# Patient Record
Sex: FEMALE | Race: ASIAN | NOT HISPANIC OR LATINO | Employment: PART TIME | URBAN - METROPOLITAN AREA
[De-identification: names, ages, dates, MRNs, and addresses within clinical notes are randomized per-mention and may not be internally consistent; named-entity substitution may affect disease eponyms.]

---

## 2017-01-01 ENCOUNTER — MYC MEDICAL ADVICE (OUTPATIENT)
Dept: NEUROLOGY | Facility: CLINIC | Age: 27
End: 2017-01-01

## 2017-01-03 NOTE — TELEPHONE ENCOUNTER
Patient with involved medical history, including intercranial bleed, low platelets, and end stage renal disease. At the last visit with , she reported bruising of unknown origin.  Since then she has been seen by her PMD for ongoing diarrhea, and by haem-onc.  Discussed with . There is concern that an increase in headaches could be a symptom of further intercranial bleeding, vs other cause.  Patient should be called and assessment should be made of additional changes in neurological status, including seizures, confusion, nausea/vomiting, dizziness, incontinence, and poor balance.  If there are still concerns, patient could undergo a head CT to check for bleed, and return to MD next week. If more acute, patient should attend a local ED for urgent assessment.    Call placed to patient. She is currently getting her hair done and was not available.  Message left for her to call back.    1/9/17    Writer contacted Mona who reports that she is doing better. Her blood had been running higher than normal.   Denies neurologic changes (confusion, n/v/d, dizziness, seizures). Endorses poor balance x 1 a few days ago.  Advised to seek medical treatment at local ED if headaches worsen, change in neurological status, confusion or other concerns.

## 2017-01-05 DIAGNOSIS — M54.9 BACK PAIN, UNSPECIFIED BACK LOCATION, UNSPECIFIED BACK PAIN LATERALITY, UNSPECIFIED CHRONICITY: Primary | ICD-10-CM

## 2017-01-06 RX ORDER — ACETAMINOPHEN 325 MG/1
650 TABLET ORAL EVERY 4 HOURS PRN
Qty: 100 TABLET | Refills: 1 | Status: SHIPPED | OUTPATIENT
Start: 2017-01-06 | End: 2017-11-22

## 2017-01-16 ENCOUNTER — OFFICE VISIT (OUTPATIENT)
Dept: FAMILY MEDICINE | Facility: CLINIC | Age: 27
End: 2017-01-16

## 2017-01-16 VITALS
BODY MASS INDEX: 21.13 KG/M2 | DIASTOLIC BLOOD PRESSURE: 96 MMHG | HEIGHT: 60 IN | TEMPERATURE: 98.1 F | SYSTOLIC BLOOD PRESSURE: 157 MMHG | HEART RATE: 100 BPM | WEIGHT: 107.6 LBS | OXYGEN SATURATION: 100 %

## 2017-01-16 DIAGNOSIS — K59.1 FUNCTIONAL DIARRHEA: Primary | ICD-10-CM

## 2017-01-16 DIAGNOSIS — Z30.41 SURVEILLANCE OF PREVIOUSLY PRESCRIBED CONTRACEPTIVE PILL: ICD-10-CM

## 2017-01-16 RX ORDER — ACETAMINOPHEN AND CODEINE PHOSPHATE 120; 12 MG/5ML; MG/5ML
1 SOLUTION ORAL DAILY
Qty: 28 TABLET | Refills: 3 | Status: SHIPPED
Start: 2017-01-16 | End: 2017-04-27

## 2017-01-16 NOTE — MR AVS SNAPSHOT
After Visit Summary   1/16/2017    Mona Wagner    MRN: [id number]           Patient Information     Date Of Birth          1990        Visit Information        Provider Department      1/16/2017 10:40 AM Tracey Villalobos, DO [facility]        Today's Diagnoses     Surveillance of previously prescribed contraceptive pill    -  1       Care Instructions      What is IBS?  People who have irritable bowel syndrome (IBS) have digestive tracts that react abnormally to certain substances or to stress. This leads to symptoms like cramps, gas, bloating, pain, constipation, and diarrhea. Sometimes called  spastic colon,  IBS is a common condition that is not a disease, but rather a group of symptoms that occur together.     Muscle contraction moves food through the digestive tract.    IBS -- a motility problem  The muscle movement that passes food through the digestive tract is called motility. When you have IBS, the normal motility of the digestive tract (especially the colon) is disrupted. Motility may speed up, slow down, or become irregular. If stool passes too quickly through the colon, not enough water is absorbed from it. Loose, watery stools (diarrhea) can result. If stool passes through the colon too slowly, too much water is absorbed and the stool becomes hard and dry (constipation). Also, stool and gas may back up and cause painful pressure and cramping.  What causes IBS?    Smoking, eating certain foods, or drinking alcohol or caffeinated drinks can cause symptoms of IBS.    Stress or anxiety can contribute to the motility problems that cause IBS.    Although no one knows for sure, IBS may be caused by a problem with the nerves or muscles in your digestive tract.    There is also some evidence that certain bacteria in the small intestine and colon may cause IBS.  What you can do    Certain medications may help regulate the working of your digestive tract. Your health care provider  may prescribe one or more for you.    Medication can t cure IBS, but it may help manage the symptoms.    Because some medications may make IBS worse, don t take any medication, especially laxatives, unless your health care provider prescribes it for you.    Your health care provider may suggest some lifestyle changes to help control your IBS. Two of the most important are changing your diet and managing stress.    7644-5660 [employer]. [address] [place] [address], [place] [address]. All rights reserved. This information is not intended as a substitute for professional medical care. Always follow your healthcare professional's instructions.        Your Body s Response to Anxiety  Normal anxiety is part of the body s natural defense system. It's an alert to a threat that is unknown, vague, or comes from your own internal fears. While you re in this state, your feelings can range from a vague sense of worry to physical sensations such as a pounding heartbeat. These feelings make you want to react to the threat. An anxiety response is normal in many situations. But when you have an anxiety disorder, the same response can occur at the wrong times.    Anxiety can be helpful  Normal anxiety is a signal from your brain that warns you of a threat and is a normal response to help you prevent something or decrease the bad effects of something you can't control. For example, anxiety is a normal response to situations that might damage your body, separate you from a loved one, or lose your job. The symptoms of anxiety can be physical and mental.  How does it feel?  At certain times, people with anxiety may have:    Dizziness    Muscle tension or pain    Restlessness    Sleeplessness    Difficulty concentrating    Racing heartbeat    Fast breathing    Shaking or trembling    Stomachache    Diarrhea    Loss of energy    Sweating    Cold, clammy hands    Chest pain    Dry mouth  Anxiety can also be a problem  Anxiety can  become a problem when it is difficult to control, occurs for months, and interferes with important parts of your life. With an anxiety disorder, your body has the response described above, but in inappropriate ways. The response a person has depends on the anxiety disorder he or she has. With some disorders, the anxiety is way out of proportion to the threat that triggers it. With others, anxiety may occur even when there isn t a clear threat or trigger.  Who does it affect?  Some people are more prone to persistent anxiety than others. It tends to run in families, and it affects more younger people than older people. But no age, race, or gender is immune to anxiety problems.  Anxiety can be treated  The good news is that the anxiety that s disrupting your life can be treated. Working with your doctor or other healthcare provider, you can develop skills to help you cope with anxiety. You can also gain the perspective you need to overcome your fears. Note: Good sources of support or guidance can be found at your local hospital, mental health clinic, or an employee assistance program.     If anxiety is wearing you down, here are some things you can do to cope:    Keep in mind that you can t control everything about a situation. Change what you can and let the rest take its course.    Exercise--it s a great way to relieve tension and help your body feel relaxed.    Avoid caffeine and nicotine, which can make anxiety symptoms worse.    Fight the temptation to turn to alcohol or unprescribed drugs for relief. They only make things worse in the long run.     8061-7962 [employer]. [address] [place] [address]. All rights reserved. This information is not intended as a substitute for professional medical care. Always follow your healthcare professional's instructions.              Follow-ups after your visit        Your next 10 appointments already scheduled     Feb 21, 2017 12:00 PM   (Arrive by  "11:30 AM)   RETURN ENDOCRINE with Melissa Coles MD   [facility] ([facility])    [address]  3rd Floor  [facility] [address] [phone]            Jun 26, 2017 10:00 AM   Return Visit with Theresa Sapp MD   [facility]    [address], [address]  [facility] [address] [phone]              Who to contact     Please call your clinic at [phone] to:    Ask questions about your health    Make or cancel appointments    Discuss your medicines    Learn about your test results    Speak to your doctor   If you have compliments or concerns about an experience at your clinic, or if you wish to file a complaint, please contact [facility] Patient Relations at [phone] or email us at [email or web address]         Additional Information About Your Visit        StudyRoomharParature Information     [email or web address] gives you secure access to your electronic health record. If you see a primary care provider, you can also send messages to your care team and make appointments. If you have questions, please call your primary care clinic.  If you do not have a primary care provider, please call [phone] and they will assist you.      [email or web address] is an electronic gateway that provides easy, online access to your medical records. With [email or web address], you can request a clinic appointment, read your test results, renew a prescription or communicate with your care team.     To access your existing account, please contact your [facility] or call [phone] for assistance.        Care EveryWhere ID     This is your Care EveryWhere ID. This could be used by other organizations to access your [facility] [place] medical records  [id number]        Your Vitals Were     Pulse Temperature Height BMI (Body Mass Index) Pulse Oximetry       100 98.1  F (36.7  C) (Oral) 4' 11.5\" " (151.1 cm) 21.38 kg/m2 100%        Blood Pressure from Last 3 Encounters:   01/16/17 157/96   12/29/16 173/101   12/27/16 177/97    Weight from Last 3 Encounters:   01/16/17 107 lb 9.6 oz (48.807 kg)   12/29/16 106 lb 3.2 oz (48.172 kg)   12/27/16 105 lb 3.2 oz (47.718 kg)              Today, you had the following     No orders found for display         Where to get your medicines      These medications were sent to [id number] - [place], [place] - [address]  [address] [address]     Phone:  [phone]    - norethindrone 0.35 MG per tablet       Primary Care Provider Office Phone # Fax #    Macarena Bowen [phone]       [facility] [address] [address]        Thank you!     Thank you for choosing [facility]  for your care. Our goal is always to provide you with excellent care. Hearing back from our patients is one way we can continue to improve our services. Please take a few minutes to complete the written survey that you may receive in the mail after your visit with us. Thank you!             Your Updated Medication List - Protect others around you: Learn how to safely use, store and throw away your medicines at www.disposemymeds.org.          This list is accurate as of: 1/16/17 11:42 AM.  Always use your most recent med list.                   Brand Name Dispense Instructions for use    acetaminophen 325 MG tablet    TYLENOL    100 tablet    Take 2 tablets (650 mg) by mouth every 4 hours as needed for mild pain       amLODIPine 5 MG tablet    NORVASC    180 tablet    Take 2 tablets (10 mg) by mouth daily Take one tablet twice a day.       B complex-C-folic acid 1 MG capsule     30 capsule    Take 1 capsule (1 mg) by mouth daily       doxazosin 1 MG tablet    CARDURA    30 tablet    Take 2 tablets (2 mg) by mouth At Bedtime       levETIRAcetam 250 MG tablet    KEPPRA    60 tablet    Take  1 1/2 tabs daily.  Take an additional  250 mg after dialysis on M, W, F.       levothyroxine 25 MCG tablet    SYNTHROID/LEVOTHROID    135 tablet    Take 1.5 tablets (37.5 mcg) by mouth daily       lisinopril 20 MG tablet    PRINIVIL/ZESTRIL    90 tablet    Take 1 tablet (20 mg) by mouth daily       norethindrone 0.35 MG per tablet    MICRONOR    28 tablet    Take 1 tablet (0.35 mg) by mouth daily       ondansetron 4 MG tablet    ZOFRAN    18 tablet    Take 1 tablet (4 mg) by mouth every 8 hours as needed for nausea       sevelamer 800 MG tablet    RENVELA     Take 800 mg by mouth 3 times daily (with meals)

## 2017-01-16 NOTE — PROGRESS NOTES
HPI:       Mona Wagner is a 26 year old  female with past medical history significant for ESRD on dialysis, anemia of chronic kidney disease, acquired hypothyroidism, HTN, and seizure disorder who presents for follow up of concern(s) of diarrhea.    Symptoms improving.   Stools becoming more firm over the last 3 days.   No dietary changes last 3 days. Is on dialysis diet.   Has had some medication changes to improve BP control. (Nephrology titrating BP meds)  Insight, correlation of anxiety and increased frequency of stooling. JUAN ALBERTO-7 score of 6.   Desires review of labs.          PMHX:     Patient Active Problem List   Diagnosis     ESRD (end stage renal disease) on dialysis (H)     DVT of upper extremity (deep vein thrombosis) (H)     Subarachnoid hemorrhage (H)     Seizure disorder (H)     HTN (hypertension)     Other general symptoms(780.99)     Galactorrhea     Acquired hypothyroidism     Anemia in chronic kidney disease     Increased prolactin level (H)     Hyperphosphatemia     Hypertension     Hypomagnesemia     Normocytic anemia     Pituitary neoplasm     Thrombocytopenia (H)       Current Outpatient Prescriptions   Medication Sig Dispense Refill     acetaminophen (TYLENOL) 325 MG tablet Take 2 tablets (650 mg) by mouth every 4 hours as needed for mild pain 100 tablet 1     amLODIPine (NORVASC) 5 MG tablet Take 2 tablets (10 mg) by mouth daily Take one tablet twice a day. 180 tablet 3     doxazosin (CARDURA) 1 MG tablet Take 2 tablets (2 mg) by mouth At Bedtime 30 tablet 11     levETIRAcetam (KEPPRA) 250 MG tablet Take  1 1/2 tabs daily.  Take an additional 250 mg after dialysis on M, W, F. 60 tablet 5     lisinopril (PRINIVIL/ZESTRIL) 20 MG tablet Take 1 tablet (20 mg) by mouth daily 90 tablet 3     ondansetron (ZOFRAN) 4 MG tablet Take 1 tablet (4 mg) by mouth every 8 hours as needed for nausea 18 tablet 0     norethindrone (MICRONOR) 0.35 MG per tablet Take 1 tablet (0.35 mg) by mouth daily 28 tablet  "3     levothyroxine (SYNTHROID, LEVOTHROID) 25 MCG tablet Take 1.5 tablets (37.5 mcg) by mouth daily 135 tablet 3     sevelamer (RENVELA) 800 MG tablet Take 800 mg by mouth 3 times daily (with meals)       B complex-C-folic acid (NEPHROCAPS) 1 MG capsule Take 1 capsule (1 mg) by mouth daily 30 capsule 3     Allergies   Allergen Reactions     Chlorhexidine Rash     Rash at site     Sulfa Drugs Rash     Muscle stiffness of neck            Review of Systems:   CONSTITUTIONAL: No weight loss, fevers, or fatigue.   GI: Persistent diarrhea with associated abdominal cramps, no blood in stool. No nausea or vomiting.  PSYCH: anxious          Physical Exam:     Filed Vitals:    01/16/17 1052   BP: 157/96   Pulse: 100   Temp: 98.1  F (36.7  C)   TempSrc: Oral   Height: 4' 11.5\" (151.1 cm)   Weight: 107 lb 9.6 oz (48.807 kg)   SpO2: 100%     Body mass index is 21.38 kg/(m^2).    GENERAL: Mona is a 25 yo [place] female, appears stated age, no acute distress.  HEART: RRR, no MRG  LUNGS: CTAB, unlabored  ABDOMEN: soft, nontender, bowel sounds present     Assessment and Plan     1. Functional diarrhea  Laboratory evaluation negative. Fluctuating amount of diarrhea despite consistent diet, therefore less likely to be related to diet. With association with anxiety, most likely represents irritable bowel syndrome.   - Provided information on IBS in AVS.   - Recommended mindfulness practices, consider referral for psychotherapy.   - Avoid initiation of additional medication (SSRI) with concern for GI side effects. Consider medication with anticholingeric action of diarrhea persists.       2. Surveillance of previously prescribed contraceptive pill  - norethindrone (MICRONOR) 0.35 MG per tablet; Take 1 tablet (0.35 mg) by mouth daily  Dispense: 28 tablet; Refill: 3      Options for treatment and follow-up care were reviewed with the patient and/or guardian. Mona Wagner and/or guardian engaged in the decision making process and verbalized " understanding of the options discussed and agreed with the final plan.    Tracey Villalobos DO      Precepted today with: Fernie Gamez MD

## 2017-01-16 NOTE — PROGRESS NOTES
Preceptor Attestation:  Patient's case reviewed and discussed with Tracey Villalobos,  resident and I evaluated the patient. I agree with written assessment and plan of care.  Supervising Physician:  Fernie Gamez MD  [facility]

## 2017-01-16 NOTE — PATIENT INSTRUCTIONS
What is IBS?  People who have irritable bowel syndrome (IBS) have digestive tracts that react abnormally to certain substances or to stress. This leads to symptoms like cramps, gas, bloating, pain, constipation, and diarrhea. Sometimes called  spastic colon,  IBS is a common condition that is not a disease, but rather a group of symptoms that occur together.     Muscle contraction moves food through the digestive tract.    IBS -- a motility problem  The muscle movement that passes food through the digestive tract is called motility. When you have IBS, the normal motility of the digestive tract (especially the colon) is disrupted. Motility may speed up, slow down, or become irregular. If stool passes too quickly through the colon, not enough water is absorbed from it. Loose, watery stools (diarrhea) can result. If stool passes through the colon too slowly, too much water is absorbed and the stool becomes hard and dry (constipation). Also, stool and gas may back up and cause painful pressure and cramping.  What causes IBS?    Smoking, eating certain foods, or drinking alcohol or caffeinated drinks can cause symptoms of IBS.    Stress or anxiety can contribute to the motility problems that cause IBS.    Although no one knows for sure, IBS may be caused by a problem with the nerves or muscles in your digestive tract.    There is also some evidence that certain bacteria in the small intestine and colon may cause IBS.  What you can do    Certain medications may help regulate the working of your digestive tract. Your health care provider may prescribe one or more for you.    Medication can t cure IBS, but it may help manage the symptoms.    Because some medications may make IBS worse, don t take any medication, especially laxatives, unless your health care provider prescribes it for you.    Your health care provider may suggest some lifestyle changes to help control your IBS. Two of the most important are changing your  diet and managing stress.    8181-4841 [employer]. [address] [place] [address], [place] [address]. All rights reserved. This information is not intended as a substitute for professional medical care. Always follow your healthcare professional's instructions.        Your Body s Response to Anxiety  Normal anxiety is part of the body s natural defense system. It's an alert to a threat that is unknown, vague, or comes from your own internal fears. While you re in this state, your feelings can range from a vague sense of worry to physical sensations such as a pounding heartbeat. These feelings make you want to react to the threat. An anxiety response is normal in many situations. But when you have an anxiety disorder, the same response can occur at the wrong times.    Anxiety can be helpful  Normal anxiety is a signal from your brain that warns you of a threat and is a normal response to help you prevent something or decrease the bad effects of something you can't control. For example, anxiety is a normal response to situations that might damage your body, separate you from a loved one, or lose your job. The symptoms of anxiety can be physical and mental.  How does it feel?  At certain times, people with anxiety may have:    Dizziness    Muscle tension or pain    Restlessness    Sleeplessness    Difficulty concentrating    Racing heartbeat    Fast breathing    Shaking or trembling    Stomachache    Diarrhea    Loss of energy    Sweating    Cold, clammy hands    Chest pain    Dry mouth  Anxiety can also be a problem  Anxiety can become a problem when it is difficult to control, occurs for months, and interferes with important parts of your life. With an anxiety disorder, your body has the response described above, but in inappropriate ways. The response a person has depends on the anxiety disorder he or she has. With some disorders, the anxiety is way out of proportion to the threat that triggers it. With  others, anxiety may occur even when there isn t a clear threat or trigger.  Who does it affect?  Some people are more prone to persistent anxiety than others. It tends to run in families, and it affects more younger people than older people. But no age, race, or gender is immune to anxiety problems.  Anxiety can be treated  The good news is that the anxiety that s disrupting your life can be treated. Working with your doctor or other healthcare provider, you can develop skills to help you cope with anxiety. You can also gain the perspective you need to overcome your fears. Note: Good sources of support or guidance can be found at your local hospital, mental health clinic, or an employee assistance program.     If anxiety is wearing you down, here are some things you can do to cope:    Keep in mind that you can t control everything about a situation. Change what you can and let the rest take its course.    Exercise--it s a great way to relieve tension and help your body feel relaxed.    Avoid caffeine and nicotine, which can make anxiety symptoms worse.    Fight the temptation to turn to alcohol or unprescribed drugs for relief. They only make things worse in the long run.     9878-2641 [employer]. [address] [place] [address], [place] [address]. All rights reserved. This information is not intended as a substitute for professional medical care. Always follow your healthcare professional's instructions.

## 2017-02-17 ASSESSMENT — ENCOUNTER SYMPTOMS
SPEECH CHANGE: 0
TACHYCARDIA: 1
SLEEP DISTURBANCES DUE TO BREATHING: 0
ALTERED TEMPERATURE REGULATION: 1
HOT FLASHES: 1
MEMORY LOSS: 0
HYPERTENSION: 1
HEADACHES: 1
INCREASED ENERGY: 0
HOARSE VOICE: 0
SKIN CHANGES: 0
SINUS CONGESTION: 0
TROUBLE SWALLOWING: 0
NIGHT SWEATS: 1
TINGLING: 0
DECREASED APPETITE: 0
SMELL DISTURBANCE: 0
NUMBNESS: 0
WEIGHT GAIN: 0
EXERCISE INTOLERANCE: 0
FEVER: 0
FATIGUE: 0
SEIZURES: 0
SORE THROAT: 1
SYNCOPE: 0
LOSS OF CONSCIOUSNESS: 0
WEAKNESS: 0
POLYPHAGIA: 0
DECREASED LIBIDO: 0
HALLUCINATIONS: 0
NAIL CHANGES: 0
NECK MASS: 0
PALPITATIONS: 1
WEIGHT LOSS: 0
LEG PAIN: 0
ORTHOPNEA: 0
POOR WOUND HEALING: 0
CLAUDICATION: 0
DIZZINESS: 0
LEG SWELLING: 0
LIGHT-HEADEDNESS: 0
DISTURBANCES IN COORDINATION: 0
TASTE DISTURBANCE: 0
CHILLS: 0
POLYDIPSIA: 0
SINUS PAIN: 0
HYPOTENSION: 0
PARALYSIS: 0
TREMORS: 0

## 2017-02-21 ENCOUNTER — OFFICE VISIT (OUTPATIENT)
Dept: TRANSPLANT | Facility: CLINIC | Age: 27
End: 2017-02-21
Attending: CLINICAL NURSE SPECIALIST
Payer: MEDICARE

## 2017-02-21 ENCOUNTER — RESULTS ONLY (OUTPATIENT)
Dept: OTHER | Facility: CLINIC | Age: 27
End: 2017-02-21

## 2017-02-21 ENCOUNTER — TRANSFERRED RECORDS (OUTPATIENT)
Dept: HEALTH INFORMATION MANAGEMENT | Facility: CLINIC | Age: 27
End: 2017-02-21

## 2017-02-21 ENCOUNTER — OFFICE VISIT (OUTPATIENT)
Dept: ENDOCRINOLOGY | Facility: CLINIC | Age: 27
End: 2017-02-21

## 2017-02-21 VITALS
TEMPERATURE: 97.4 F | DIASTOLIC BLOOD PRESSURE: 108 MMHG | OXYGEN SATURATION: 95 % | RESPIRATION RATE: 16 BRPM | HEART RATE: 61 BPM | SYSTOLIC BLOOD PRESSURE: 173 MMHG

## 2017-02-21 VITALS
DIASTOLIC BLOOD PRESSURE: 104 MMHG | SYSTOLIC BLOOD PRESSURE: 165 MMHG | BODY MASS INDEX: 20.85 KG/M2 | HEIGHT: 60 IN | HEART RATE: 78 BPM | WEIGHT: 106.2 LBS

## 2017-02-21 DIAGNOSIS — N89.8 VAGINAL ITCHING: ICD-10-CM

## 2017-02-21 DIAGNOSIS — D69.6 THROMBOCYTOPENIA (H): ICD-10-CM

## 2017-02-21 DIAGNOSIS — N64.3 GALACTORRHEA: ICD-10-CM

## 2017-02-21 DIAGNOSIS — B37.31 VULVOVAGINAL CANDIDIASIS: ICD-10-CM

## 2017-02-21 DIAGNOSIS — T82.9XXA COMPLICATION OF VASCULAR ACCESS FOR DIALYSIS, INITIAL ENCOUNTER: ICD-10-CM

## 2017-02-21 DIAGNOSIS — Z76.82 ORGAN TRANSPLANT CANDIDATE: ICD-10-CM

## 2017-02-21 DIAGNOSIS — T82.9XXA COMPLICATION OF AV DIALYSIS FISTULA, INITIAL ENCOUNTER: ICD-10-CM

## 2017-02-21 DIAGNOSIS — Z76.82 ORGAN TRANSPLANT CANDIDATE: Primary | ICD-10-CM

## 2017-02-21 DIAGNOSIS — E03.9 ACQUIRED HYPOTHYROIDISM: ICD-10-CM

## 2017-02-21 DIAGNOSIS — E03.9 ACQUIRED HYPOTHYROIDISM: Primary | ICD-10-CM

## 2017-02-21 DIAGNOSIS — N18.6 ESRD ON DIALYSIS (H): Primary | ICD-10-CM

## 2017-02-21 DIAGNOSIS — N91.2 AMENORRHEA: ICD-10-CM

## 2017-02-21 DIAGNOSIS — T82.898A OTHER SPECIFIED COMPLICATION OF VASCULAR PROSTHETIC DEVICES, IMPLANTS AND GRAFTS, INITIAL ENCOUNTER (H): ICD-10-CM

## 2017-02-21 DIAGNOSIS — Z99.2 ESRD ON DIALYSIS (H): Primary | ICD-10-CM

## 2017-02-21 LAB
APTT PPP: 31 SEC (ref 22–37)
FIBRINOGEN PPP-MCNC: 311 MG/DL (ref 200–420)
INR PPP: 0.93 (ref 0.86–1.14)
PROLACTIN SERPL-MCNC: 38 UG/L (ref 3–27)
T3 SERPL-MCNC: 75 NG/DL (ref 60–181)
T4 FREE SERPL-MCNC: 1.11 NG/DL (ref 0.76–1.46)
TSH SERPL DL<=0.05 MIU/L-ACNC: 1.64 MU/L (ref 0.4–4)

## 2017-02-21 PROCEDURE — 86832 HLA CLASS I HIGH DEFIN QUAL: CPT | Performed by: INTERNAL MEDICINE

## 2017-02-21 PROCEDURE — 99211 OFF/OP EST MAY X REQ PHY/QHP: CPT

## 2017-02-21 PROCEDURE — 84480 ASSAY TRIIODOTHYRONINE (T3): CPT | Performed by: INTERNAL MEDICINE

## 2017-02-21 PROCEDURE — 84146 ASSAY OF PROLACTIN: CPT | Performed by: INTERNAL MEDICINE

## 2017-02-21 PROCEDURE — 86833 HLA CLASS II HIGH DEFIN QUAL: CPT | Performed by: INTERNAL MEDICINE

## 2017-02-21 RX ORDER — LEVOTHYROXINE SODIUM 25 UG/1
TABLET ORAL
Qty: 135 TABLET | Refills: 3 | Status: SHIPPED | OUTPATIENT
Start: 2017-02-21 | End: 2018-04-09

## 2017-02-21 ASSESSMENT — PAIN SCALES - GENERAL: PAINLEVEL: NO PAIN (0)

## 2017-02-21 NOTE — PROGRESS NOTES
Student Endocrinology Note    Chief Complaint: Hyperprolactinemia   Information obtained from:Patient    Subjective:         HPI: Mona Wagner is a 26 year old year old woman with PMHx of ESRD on dialysis, galactorrhea 2/2 pituitary microadenoma, and hypothyroidism who presents for follow-up of her hypothyroidism and hyperprolactinemia. I last saw her in August 2016.    Mona had galactorrhea that resolved after correction of her hypothyroidism with levothyroxine. She still had an elevated prolactin (PRL= 43) despite resolution of the galactorrhea. She had a repeat MRI of the brain, that reports no changes in pituitary imaging as compared to previous image study. This was not a pituitary dedicated MRI.  She has not had galactorrhea since starting thyroid treatment. She takes a contraceptive pill in addition to thyroid medication. She has never seen a neurosurgeon regarding her pituitary mass.    Ms. Wagner endorses having headaches, rapid heart rate, and feeling anxious.  She also reports night sweats on her face and chest, always at night, 2-3 times per week.  She wakes up during the night due to these sweating spells. She also reports hair loss.  She also reports loose stools and diarrhea.  She has not noticed any skin or hair changes, extremity edema, or vision changes. She has not noted any changes in her peripheral vision. Mona also lost about 4 lbs.  She is currently on levothyroxine 37.5 mcg on per day.  She is concerned her levothyroxine dose is too high and causing these symptoms.  She takes levothyroxine at bed time, and is concerned the night sweating she is experiencing is related to that.    Ms. Wagner has IgA nephropathy. She has ESRD and is on hemodialysis.  She is on the transplant list.      Past Medical History   Diagnosis Date     Anemia in chronic kidney disease      Dialysis patient (H)      End stage renal disease on dialysis (H)      Hypothyroid      Pituitary adenoma (H)        Past Surgical  History   Procedure Laterality Date     Luis/dialysis catheter  12/10/2013           Create fistula arteriovenous upper extremity  1/2/2014     Procedure: CREATE FISTULA ARTERIOVENOUS UPPER EXTREMITY;  Left Wrist Arteriovenous Fistula Placement;  Surgeon: Shashi Castro MD;  Location: [facility] OR       Current Outpatient Prescriptions   Medication Sig Dispense Refill     norethindrone (MICRONOR) 0.35 MG per tablet Take 1 tablet (0.35 mg) by mouth daily 28 tablet 3     acetaminophen (TYLENOL) 325 MG tablet Take 2 tablets (650 mg) by mouth every 4 hours as needed for mild pain 100 tablet 1     amLODIPine (NORVASC) 5 MG tablet Take 2 tablets (10 mg) by mouth daily Take one tablet twice a day. 180 tablet 3     levETIRAcetam (KEPPRA) 250 MG tablet Take  1 1/2 tabs daily.  Take an additional 250 mg after dialysis on M, W, F. 60 tablet 5     lisinopril (PRINIVIL/ZESTRIL) 20 MG tablet Take 1 tablet (20 mg) by mouth daily (Patient taking differently: Take 40 mg by mouth daily ) 90 tablet 3     ondansetron (ZOFRAN) 4 MG tablet Take 1 tablet (4 mg) by mouth every 8 hours as needed for nausea 18 tablet 0     levothyroxine (SYNTHROID, LEVOTHROID) 25 MCG tablet Take 1.5 tablets (37.5 mcg) by mouth daily 135 tablet 3     sevelamer (RENVELA) 800 MG tablet Take 800 mg by mouth 3 times daily (with meals)        B complex-C-folic acid (NEPHROCAPS) 1 MG capsule Take 1 capsule (1 mg) by mouth daily 30 capsule 3     doxazosin (CARDURA) 1 MG tablet Take 2 tablets (2 mg) by mouth At Bedtime (Patient not taking: Reported on 2/21/2017) 30 tablet 11          Allergies   Allergen Reactions     Chlorhexidine Rash     Rash at site     Sulfa Drugs Rash     Muscle stiffness of neck       Family History   Problem Relation Age of Onset     Hypertension Sister      DIABETES No family hx of      Coronary Artery Disease No family hx of      Breast Cancer No family hx of      Cancer - colorectal No family hx of      Ovarian Cancer No family hx of       Prostate Cancer No family hx of      Other Cancer No family hx of      CEREBROVASCULAR DISEASE No family hx of      Asthma No family hx of      KIDNEY DISEASE Mother      KIDNEY DISEASE Sister        Social History     Social History     Marital Status:      Spouse Name: N/A     Number of Children: N/A     Years of Education: N/A     Social History Main Topics     Smoking status: Never Smoker      Smokeless tobacco: Never Used     Alcohol Use: No     Drug Use: No     Sexual Activity:     Partners: Male     Other Topics Concern     None     Social History Narrative    Date of Service:5/15/2013     Name: Mona VALDOVINOS (Month, Day, Year of birth): 1990     MRN: [id number]     New Patient: Yes    Preferred Language: English     Needed: No    County of Residence: [place]    Marital Status:     Household size: 8    Number of Dependents:0     Pregnant: 0    Average Monthly Income: $ 600    Citizenship Status: Citizen    Gave Pt [employer] and [employer] applications       Review of Systems   11 Point ROS is as detailed in the HPI above, as below or negative    Answers for HPI/ROS submitted by the patient on 2017   General Symptoms: Yes  Skin Symptoms: Yes  HENT Symptoms: Yes  EYE SYMPTOMS: No  HEART SYMPTOMS: Yes  LUNG SYMPTOMS: No  INTESTINAL SYMPTOMS: No  URINARY SYMPTOMS: No  GYNECOLOGIC SYMPTOMS: Yes  BREAST SYMPTOMS: No  SKELETAL SYMPTOMS: No  BLOOD SYMPTOMS: No  NERVOUS SYSTEM SYMPTOMS: Yes  MENTAL HEALTH SYMPTOMS: No  Night sweats: Yes  Feeling hot or cold when others believe the temperature is normal: Yes  Changes in hair: Yes  Sore throat: Yes  Chest pain or pressure: Yes  Fast or irregular heartbeat: Yes  High blood pressure: Yes  Chest pain at rest: Yes  Edema or swelling: Yes  Fast heart beat: Yes  Headache: Yes  Bleeding or spotting between periods: Yes  Irregular periods: Yes  Vaginal discharge: Yes  Hot flashes: Yes      Objective:   BP (!) 165/104  Pulse 78  Ht 1.511 m (4'  "11.5\")  Wt 48.2 kg (106 lb 3.2 oz)  BMI 21.09 kg/m2  General: pt appears healthy and well; no acute distress  HEENT: hair appears normal; mucous membranes moist; speech is clear; normocephalic   Neck: no lymphadenopathy   Thyroid:  no thyroid enlargement, nodularity, or tenderness  Cardiac: +3/6 holosystolic murmur loudest along LSB; normal S1/S2 and rhythm; no S3/S4, clicks, or rubs; capillary refill <2 sec  Resp: appropriate effort with air entry to lung bases; no increased work of breathing; breath sounds are symmetric; no crackles, wheezes, or rhonchi - lungs clear to auscultation bilaterally   Extremities: +fistula on left upper extremity; no edema or finger clubbing   Derm: no rash, skin lesions, or dryness on face, neck, hands or lower legs  Neuro: symmetric +2/4 biceps and patellar tendon reflexes; no slowing of speech or movements; pt ambulates normally  Psych: pt is appropriately conversational with appropriate affect    In House Labs:   No results found for this basename: a1c    TSH   Date Value Ref Range Status   12/29/2016 1.88 0.40 - 4.00 mU/L Final   04/06/2016 1.78 0.40 - 4.00 mU/L Final   12/15/2015 1.63 0.40 - 4.00 mU/L Final   09/01/2015 2.54 0.40 - 4.00 mU/L Final   11/04/2014 4.75 (H) 0.40 - 4.00 mU/L Final     Comment:     Effective 7/30/2014, the reference range for this assay has changed to reflect   new instrumentation/methodology.       T4 Free   Date Value Ref Range Status   04/06/2016 1.02 0.76 - 1.46 ng/dL Final   12/15/2015 1.05 0.76 - 1.46 ng/dL Final   09/01/2015 1.24 0.76 - 1.46 ng/dL Final   06/25/2015 1.2 0.7 - 1.8 ng/dL Final   04/16/2015 1.2 0.7 - 1.8 ng/dL Final       Creatinine   Date Value Ref Range Status   12/29/2016 8.50 (H) 0.52 - 1.04 mg/dL Final   ]    Recent Labs   Lab Test  12/26/13   1007  12/11/13   0601   CHOL  208*  200   HDL  35*  29*   LDL  144*  156*   TRIG  144  76   CHOLHDLRATIO  5.9*  6.9*       No results found for: [id number], [id number], " [id number]      Assessment and Plan:      Mona Wagner is a 26 year old year old woman with a PMHx of ESRD, galactorrhea 2/2 pituitary microadenoma, and hypothyroidism who presents for follow-up of her hypothyroidism and hyperprolactinemia.       1. Hypothyroidism: Patient has primary hypothyroidism that has been managed with levothyroxine.  She is currently on 37.5 mcg/day.  TSH was 1.88.  She is clinically euthyroid to slightly hyperthyroid, and would like to try a lower levothyroxine dose.  She felt well on 37.5 mcg 3 days a week (M, W and F) and 25 mcg 4 days a week.  Plasma levels of levothyroxine peak 2-4 hours after oral administration.  We also discussed taking the medication at an earlier time, although it is unlikely this is causing her symptoms.   Plan:  1. Repeat TFT today   2. Reduce levothyroxine dose to 37.5 mcg 3 days a week (M, W and F) and 25 mcg 4 days a week  3. Recheck TFTs in 6-8 weeks    2. Hyperprolactinemia   - likely 2/2 microadenoma due to persistent elevation of prolactin after hypothyroidism treatment  - initially measured adenoma to be 6.7mm x 5.3mm   - no change noted on last MRI (08/11/2016), but pituitary sequence likely not done  - imaging important as cabergoline may be started if adenoma grew  Plan:  1. Repeat MRI with pituitary sequence  3. Start cabergoline pending imaging  4. Consult neurosurgery if adenoma grows or pt has symptoms after cabergoline tx (galactorrhea, vision loss, infertility)    3. Oligomenorrhea  - ddx: chronic illness (ESRD) vs hypogonadism vs hypothyroidism +/- contraceptive pill effects  - pt symptoms of fatigue, hot flashes/hot temperature, decreased menstruation may be attributable to hypogonadism  - pt had low-normal estrogen in past  Plan:  1. Will wait for PRL levels  2. Cannot reassess sex hormones until pt off of contraceptive pills     Test and/or medications prescribed by physician today:  Orders Placed This Encounter   Procedures     MRI  Brain-PITUITARY w/o contrast     TSH     T4 free     T3 total     Prolactin       Nidhi Coles MD PhD    Division of Endocrinology and Diabetes

## 2017-02-21 NOTE — LETTER
2/21/2017       RE: Mona Wagner  [address]  [place] [address]     Dear Colleague,    Thank you for referring your patient, Mona Wagner, to the [facility] SOLID ORGAN TRANSPLANT at [facility]. Please see a copy of my visit note below.    Dialysis Access Service   Progress Note    S:  Ms. Wagner is being seen today for her dialysis access issues.  She reports no issues with the wound, and  no steal syndrome of the distal extremity. She reports that she developed 2 buttonhole sites of her left forearm AV fistula after last clinic visit with Dr. Castro in July 2016. Dialysis with buttonhole sites is doing well without issues. 2 pseudoaneurysms is getting enlarge since last clinic visit and she has concerns the skin of pseudoaneurysms is getting a little bit shinier. Left upper arm cephalic vein and neck is getting more prominent, dialysis clearance is trending downwards in the last a couple of months. She also C/O increased arterial pressure on dialysis and mild pain at pseudoaneurysm at distal limb of AV fistula. Denies pain, swelling, tingling/numbness or a change color/temperature in left arm. S/P left forearm radio cephalic AV fistula creation on 1/2/2014.    O:  Temp:  [97.4  F (36.3  C)] 97.4  F (36.3  C)  Pulse:  [61-83] 61  Resp:  [16] 16  BP: (165-174)/(101-108) 173/108  SpO2:  [95 %] 95 %  GENERAL: no distress  Circulation:   Radial pulse 2+  Ulnar pulse  2+   Capillary refill:  capillary refill < 2 sec    Sensory exam:   arm: Normal   [x]           Abnormal   []          Comment:    hand: Normal   [x]           Abnormal   []          Comment:   Motor exam:   arm: Normal   [x]           Abnormal   []          Comment:    hand: Normal   [x]           Abnormal   []          Comment:    Access: L upper extremity wound(s) healed. non-tender. Capillary refill < 2 sec ++ thrill and bruit via hand held doppler present. No edema without apparent infection. Left upper arm cephalic  vein and left IJ in neck are visible and prominent. 2 pseudoaneurysms non-tender without skin breakdown. Raise arm exam: AV fistula and pseudoaneurysms are flatten. 2 buttonhole sites no S/S of infection.  LUE US of AV fistula today.    Assessment & Plan: Ms. Headley dialysis access has matured very well at this time point.    1. LUE US of AV fistula today  2. Will review LUE US of AV fistula result and discuss with Dr. Castro for plan  We would like to see the patient back in the clinic as needed to assess progress. The patient was counselled to contact our nurse coordinator, ALCIDES Allred CNS (Sum) at [phone] with any questions or concerns.  Thank you for the opportunity to participate in Ms. Wagner's care.    TT: 15 min  CT: 10 min    MIKE Quiorz (Sum)  Dialysis access program   [facility]  Pager # [phone]    Again, thank you for allowing me to participate in the care of your patient.      Sincerely,    ALCIDES Collado

## 2017-02-21 NOTE — NURSING NOTE
"Chief Complaint   Patient presents with     Vascular Access Problem     fistula follow up       Initial BP (!) 173/108 (BP Location: Right arm, Patient Position: Chair, Cuff Size: Adult Small)  Pulse 61  Temp 97.4  F (36.3  C) (Oral)  Resp 16  SpO2 95% Estimated body mass index is 21.09 kg/(m^2) as calculated from the following:    Height as of an earlier encounter on 2/21/17: 1.511 m (4' 11.5\").    Weight as of an earlier encounter on 2/21/17: 48.2 kg (106 lb 3.2 oz).  Medication Reconciliation: complete    "

## 2017-02-21 NOTE — PROGRESS NOTES
Dialysis Access Service   Progress Note    S:  Ms. Wagner is being seen today for her dialysis access issues.  She reports no issues with the wound, and  no steal syndrome of the distal extremity. She reports that she developed 2 buttonhole sites of her left forearm AV fistula after last clinic visit with Dr. Castro in July 2016. Dialysis with buttonhole sites is doing well without issues. 2 pseudoaneurysms is getting enlarge since last clinic visit and she has concerns the skin of pseudoaneurysms is getting a little bit shinier. Left upper arm cephalic vein and neck is getting more prominent, dialysis clearance is trending downwards in the last a couple of months. She also C/O increased arterial pressure on dialysis and mild pain at pseudoaneurysm at distal limb of AV fistula. Denies pain, swelling, tingling/numbness or a change color/temperature in left arm. S/P left forearm radio cephalic AV fistula creation on 1/2/2014.    O:  Temp:  [97.4  F (36.3  C)] 97.4  F (36.3  C)  Pulse:  [61-83] 61  Resp:  [16] 16  BP: (165-174)/(101-108) 173/108  SpO2:  [95 %] 95 %  GENERAL: no distress  Circulation:   Radial pulse 2+  Ulnar pulse  2+   Capillary refill:  capillary refill < 2 sec    Sensory exam:   arm: Normal   [x]           Abnormal   []          Comment:    hand: Normal   [x]           Abnormal   []          Comment:   Motor exam:   arm: Normal   [x]           Abnormal   []          Comment:    hand: Normal   [x]           Abnormal   []          Comment:    Access: L upper extremity wound(s) healed. non-tender. Capillary refill < 2 sec ++ thrill and bruit via hand held doppler present. No edema without apparent infection. Left upper arm cephalic vein and left IJ in neck are visible and prominent. 2 pseudoaneurysms non-tender without skin breakdown. Raise arm exam: AV fistula and pseudoaneurysms are flatten. 2 buttonhole sites no S/S of infection.  LUE US of AV fistula today.    Assessment & Plan: Ms. Wagner's dialysis  access has matured very well at this time point.    1. LUE US of AV fistula today  2. Will review LUE US of AV fistula result and discuss with Dr. Castro for plan  We would like to see the patient back in the clinic as needed to assess progress. The patient was counselled to contact our nurse coordinator, ALCIDES Allred CNS (Sum) at [phone] with any questions or concerns.  Thank you for the opportunity to participate in Ms. Wagner's care.    TT: 15 min  CT: 10 min    MIKE Quiroz (Sum)  Dialysis access program   [facility]  Pager # [phone]

## 2017-02-21 NOTE — LETTER
2/21/2017       RE: Mona Wagner  [address]  [place] [address]     Dear Colleague,    Thank you for referring your patient, Mona Wagner, to the [facility] SOLID ORGAN TRANSPLANT at [facility]. Please see a copy of my visit note below.    Dialysis Access Service   Progress Note    S:  Ms. Wagner is being seen today for her dialysis access issues.  She reports no issues with the wound, and  no steal syndrome of the distal extremity. She reports that she developed 2 buttonhole sites of her left forearm AV fistula after last clinic visit with Dr. Castro in July 2016. Dialysis with buttonhole sites is doing well without issues. 2 pseudoaneurysms is getting enlarge since last clinic visit and she has concerns the skin of pseudoaneurysms is getting a little bit shinier. Left upper arm cephalic vein and neck is getting more prominent, dialysis clearance is trending downwards in the last a couple of months. She also C/O increased arterial pressure on dialysis and mild pain at pseudoaneurysm at distal limb of AV fistula. Denies pain, swelling, tingling/numbness or a change color/temperature in left arm. S/P left forearm radio cephalic AV fistula creation on 1/2/2014.    O:  Temp:  [97.4  F (36.3  C)] 97.4  F (36.3  C)  Pulse:  [61-83] 61  Resp:  [16] 16  BP: (165-174)/(101-108) 173/108  SpO2:  [95 %] 95 %  GENERAL: no distress  Circulation:   Radial pulse 2+  Ulnar pulse  2+   Capillary refill:  capillary refill < 2 sec    Sensory exam:   arm: Normal   [x]           Abnormal   []          Comment:    hand: Normal   [x]           Abnormal   []          Comment:   Motor exam:   arm: Normal   [x]           Abnormal   []          Comment:    hand: Normal   [x]           Abnormal   []          Comment:    Access: L upper extremity wound(s) healed. non-tender. Capillary refill < 2 sec ++ thrill and bruit via hand held doppler present. No edema without apparent infection. Left upper arm cephalic  vein and left IJ in neck are visible and prominent. 2 pseudoaneurysms non-tender without skin breakdown. Raise arm exam: AV fistula and pseudoaneurysms are flatten. 2 buttonhole sites no S/S of infection.  LUE US of AV fistula today.    Assessment & Plan: Ms. Headley dialysis access has matured very well at this time point.    1. LUE US of AV fistula today  2. Will review LUE US of AV fistula result and discuss with Dr. Castro for plan  We would like to see the patient back in the clinic as needed to assess progress. The patient was counselled to contact our nurse coordinator, ALCIDES Allred CNS (Sum) at [phone] with any questions or concerns.  Thank you for the opportunity to participate in Ms. Wagner's care.    TT: 15 min  CT: 10 min    MIKE Quiroz (Sum)  Dialysis access program   [facility]  Pager # [phone]

## 2017-02-21 NOTE — MR AVS SNAPSHOT
After Visit Summary   2/21/2017    Mona Wagner    MRN: [id number]           Patient Information     Date Of Birth          1990        Visit Information        Provider Department      2/21/2017 12:00 PM Melissa Coles MD [facility]        Today's Diagnoses     Acquired hypothyroidism    -  1    Galactorrhea           Follow-ups after your visit        Follow-up notes from your care team     Return in about 4 months (around 6/21/2017).      Your next 10 appointments already scheduled     May 30, 2017 12:15 PM CDT   (Arrive by 12:00 PM)   MR BRAIN W/O CONTRAST with [facility] ([facility])    [address] [address] [phone]           Take your medicines as usual, unless your doctor tells you not to. Bring a list of your current medicines to your exam (including vitamins, minerals and over-the-counter drugs). Also bring the results of similar scans you may have had.  Please remove any body piercings and hair extensions before you arrive.  Follow your doctor s orders. If you do not, we may have to postpone your exam.  You will not have contrast for this exam. You do not need to do anything special to prepare.  The MRI machine uses a strong magnet. Please wear clothes without metal (snaps, zippers). A sweatsuit works well, or we may give you a hospital gown.   **IMPORTANT** THE INSTRUCTIONS BELOW ARE ONLY FOR THOSE PATIENTS WHO HAVE BEEN TOLD THEY WILL RECEIVE SEDATION OR GENERAL ANESTHESIA DURING THEIR MRI PROCEDURE:  IF YOU WILL RECEIVE SEDATION (take medicine to help you relax during your exam):   You must get the medicine from your doctor before you arrive. Bring the medicine to the exam. Do not take it at home.   Arrive one hour early. Bring someone who can take you home after the test. Your medicine will make you sleepy. After the exam, you may not drive, take a bus or take a  taxi by yourself.   No eating 8 hours before your exam. You may have clear liquids up until 4 hours before your exam. (Clear liquids include water, clear tea, black coffee and fruit juice without pulp.)  IF YOU WILL RECEIVE ANESTHESIA (be asleep for your exam):   Arrive 1 1/2 hours early. Bring someone who can take you home after the test. You may not drive, take a bus or take a taxi by yourself.   No eating 8 hours before your exam. You may have clear liquids up until 4 hours before your exam. (Clear liquids include water, clear tea, black coffee and fruit juice without pulp.)   You will spend four to five hours in the recovery room.  Please call the Imaging Department at your exam site with any questions.            Jun 26, 2017 10:00 AM CDT   Return Visit with Theresa Sapp MD   [facility] ([facility])    [address] [place] [address], [address]  [facility] [id number] [phone]            Jul 11, 2017 12:30 PM CDT   (Arrive by 12:15 PM)   RETURN ENDOCRINE with Melissa Coles MD   [facility] ([facility])    [address] [facility]  3rd Floor  [facility] [address] [phone]              Future tests that were ordered for you today     Open Future Orders        Priority Expected Expires Ordered    MRI Brain-PITUITARY w/o contrast Routine  9/19/2017 2/21/2017    TSH Routine  2/21/2018 2/21/2017    T4 free Routine  2/21/2018 2/21/2017    T3 total Routine  2/21/2018 2/21/2017    Prolactin Routine  2/21/2018 2/21/2017            Who to contact     Please call your clinic at [phone] to:    Ask questions about your health    Make or cancel appointments    Discuss your medicines    Learn about your test results    Speak to your doctor   If you have compliments or concerns about an experience at your clinic, or if you wish to file a complaint, please contact [facility] Patient Relations at [phone] or  "email us at [email or web address]         Additional Information About Your Visit        [facility] Information     [employer] gives you secure access to your electronic health record. If you see a primary care provider, you can also send messages to your care team and make appointments. If you have questions, please call your primary care clinic.  If you do not have a primary care provider, please call [phone] and they will assist you.      [employer] is an electronic gateway that provides easy, online access to your medical records. With [employer], you can request a clinic appointment, read your test results, renew a prescription or communicate with your care team.     To access your existing account, please contact your [facility] or call [phone] for assistance.        Care EveryWhere ID     This is your Care EveryWhere ID. This could be used by other organizations to access your [facility] medical records  [id number]        Your Vitals Were     Pulse Height BMI (Body Mass Index)             78 1.511 m (4' 11.5\") 21.09 kg/m2          Blood Pressure from Last 3 Encounters:   02/21/17 (!) 165/104   01/16/17 (!) 157/96   12/29/16 (!) 173/101    Weight from Last 3 Encounters:   02/21/17 48.2 kg (106 lb 3.2 oz)   01/16/17 48.8 kg (107 lb 9.6 oz)   12/29/16 48.2 kg (106 lb 3.2 oz)                 Today's Medication Changes          These changes are accurate as of: 2/21/17  1:10 PM.  If you have any questions, ask your nurse or doctor.               These medicines have changed or have updated prescriptions.        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     Thank you for choosing [facility]  for your care. Our goal is always to provide you with excellent care. Hearing back from our patients is one way we can continue to improve our services. Please take a few minutes to complete the written survey that you may receive in the mail after your visit with us. Thank you!             Your Updated Medication List - Protect others around you: Learn how to safely use, store and throw away your medicines at www.disposemymeds.org.          This list is accurate as of: 2/21/17  1:10 PM.  Always use your most recent med list.                   Brand Name Dispense Instructions for use    acetaminophen 325 MG tablet    TYLENOL    100 tablet    Take 2 tablets (650 mg) by mouth every 4 hours as needed for mild pain       amLODIPine 5 MG tablet    NORVASC    180 tablet    Take 2 tablets (10 mg) by mouth daily Take one tablet twice a day.       B complex-C-folic acid 1 MG capsule     30 capsule    Take 1 capsule (1 mg) by mouth daily       doxazosin 1 MG tablet    CARDURA    30 tablet    Take 2 tablets (2 mg) by mouth At Bedtime       levETIRAcetam 250 MG tablet    KEPPRA    60 tablet    Take  1 1/2 tabs daily.  Take an additional 250 mg after dialysis on M, W, F.       levothyroxine 25 MCG tablet    SYNTHROID/LEVOTHROID    135 tablet    Take 1.5 tablets (37.5 mcg) by mouth daily       lisinopril 20 MG tablet    PRINIVIL/ZESTRIL    90 tablet    Take 1 tablet (20 mg) by mouth daily       norethindrone 0.35 MG per tablet    MICRONOR    28 tablet    Take 1 tablet (0.35 mg) by mouth daily       ondansetron 4 MG tablet    ZOFRAN    18 tablet    Take 1 tablet (4 mg) by mouth every 8 hours as needed for nausea       sevelamer 800 MG tablet    RENVELA     Take 800 mg by mouth 3 times daily (with meals)

## 2017-02-21 NOTE — LETTER
2/21/2017       RE: Mona Wagner  [address]  [place] [address]     Dear Colleague,    Thank you for referring your patient, Mona Wagner, to the [facility] ENDOCRINOLOGY at [facility]. Please see a copy of my visit note below.    Student Endocrinology Note    Chief Complaint: Hyperprolactinemia   Information obtained from:Patient    Subjective:         HPI: Mona Wagner is a 26 year old year old woman with PMHx of ESRD on dialysis, galactorrhea 2/2 pituitary microadenoma, and hypothyroidism who presents for follow-up of her hypothyroidism and hyperprolactinemia. I last saw her in August 2016.    Mona had galactorrhea that resolved after correction of her hypothyroidism with levothyroxine. She still had an elevated prolactin (PRL= 43) despite resolution of the galactorrhea. She had a repeat MRI of the brain, that reports no changes in pituitary imaging as compared to previous image study. This was not a pituitary dedicated MRI.  She has not had galactorrhea since starting thyroid treatment. She takes a contraceptive pill in addition to thyroid medication. She has never seen a neurosurgeon regarding her pituitary mass.    Ms. Wagner endorses having headaches, rapid heart rate, and feeling anxious.  She also reports night sweats on her face and chest, always at night, 2-3 times per week.  She wakes up during the night due to these sweating spells. She also reports hair loss.  She also reports loose stools and diarrhea.  She has not noticed any skin or hair changes, extremity edema, or vision changes. She has not noted any changes in her peripheral vision. Mona also lost about 4 lbs.  She is currently on levothyroxine 37.5 mcg on per day.  She is concerned her levothyroxine dose is too high and causing these symptoms.  She takes levothyroxine at bed time, and is concerned the night sweating she is experiencing is related to that.    Ms. Wagner has IgA nephropathy. She has ESRD and is  on hemodialysis.  She is on the transplant list.      Past Medical History   Diagnosis Date     Anemia in chronic kidney disease      Dialysis patient (H)      End stage renal disease on dialysis (H)      Hypothyroid      Pituitary adenoma (H)        Past Surgical History   Procedure Laterality Date     Luis/dialysis catheter  12/10/2013           Create fistula arteriovenous upper extremity  1/2/2014     Procedure: CREATE FISTULA ARTERIOVENOUS UPPER EXTREMITY;  Left Wrist Arteriovenous Fistula Placement;  Surgeon: Shashi Castro MD;  Location: [facility] OR       Current Outpatient Prescriptions   Medication Sig Dispense Refill     norethindrone (MICRONOR) 0.35 MG per tablet Take 1 tablet (0.35 mg) by mouth daily 28 tablet 3     acetaminophen (TYLENOL) 325 MG tablet Take 2 tablets (650 mg) by mouth every 4 hours as needed for mild pain 100 tablet 1     amLODIPine (NORVASC) 5 MG tablet Take 2 tablets (10 mg) by mouth daily Take one tablet twice a day. 180 tablet 3     levETIRAcetam (KEPPRA) 250 MG tablet Take  1 1/2 tabs daily.  Take an additional 250 mg after dialysis on M, W, F. 60 tablet 5     lisinopril (PRINIVIL/ZESTRIL) 20 MG tablet Take 1 tablet (20 mg) by mouth daily (Patient taking differently: Take 40 mg by mouth daily ) 90 tablet 3     ondansetron (ZOFRAN) 4 MG tablet Take 1 tablet (4 mg) by mouth every 8 hours as needed for nausea 18 tablet 0     levothyroxine (SYNTHROID, LEVOTHROID) 25 MCG tablet Take 1.5 tablets (37.5 mcg) by mouth daily 135 tablet 3     sevelamer (RENVELA) 800 MG tablet Take 800 mg by mouth 3 times daily (with meals)        B complex-C-folic acid (NEPHROCAPS) 1 MG capsule Take 1 capsule (1 mg) by mouth daily 30 capsule 3     doxazosin (CARDURA) 1 MG tablet Take 2 tablets (2 mg) by mouth At Bedtime (Patient not taking: Reported on 2/21/2017) 30 tablet 11          Allergies   Allergen Reactions     Chlorhexidine Rash     Rash at site     Sulfa Drugs Rash     Muscle stiffness of neck        Family History   Problem Relation Age of Onset     Hypertension Sister      DIABETES No family hx of      Coronary Artery Disease No family hx of      Breast Cancer No family hx of      Cancer - colorectal No family hx of      Ovarian Cancer No family hx of      Prostate Cancer No family hx of      Other Cancer No family hx of      CEREBROVASCULAR DISEASE No family hx of      Asthma No family hx of      KIDNEY DISEASE Mother      KIDNEY DISEASE Sister        Social History     Social History     Marital Status:      Spouse Name: N/A     Number of Children: N/A     Years of Education: N/A     Social History Main Topics     Smoking status: Never Smoker      Smokeless tobacco: Never Used     Alcohol Use: No     Drug Use: No     Sexual Activity:     Partners: Male     Other Topics Concern     None     Social History Narrative    Date of Service:5/15/2013     Name: Mona VALDOVINOS (Month, Day, Year of birth): 1990     MRN: [id number]     New Patient: Yes    Preferred Language: English     Needed: No    County of Residence: [place]    Marital Status:     Household size: 8    Number of Dependents:0     Pregnant: 0    Average Monthly Income: $ 600    Citizenship Status: Citizen    Gave Pt [employer] and [employer] applications       Review of Systems   11 Point ROS is as detailed in the HPI above, as below or negative    Answers for HPI/ROS submitted by the patient on 2017   General Symptoms: Yes  Skin Symptoms: Yes  HENT Symptoms: Yes  EYE SYMPTOMS: No  HEART SYMPTOMS: Yes  LUNG SYMPTOMS: No  INTESTINAL SYMPTOMS: No  URINARY SYMPTOMS: No  GYNECOLOGIC SYMPTOMS: Yes  BREAST SYMPTOMS: No  SKELETAL SYMPTOMS: No  BLOOD SYMPTOMS: No  NERVOUS SYSTEM SYMPTOMS: Yes  MENTAL HEALTH SYMPTOMS: No  Night sweats: Yes  Feeling hot or cold when others believe the temperature is normal: Yes  Changes in hair: Yes  Sore throat: Yes  Chest pain or pressure: Yes  Fast or irregular heartbeat: Yes  High blood  "pressure: Yes  Chest pain at rest: Yes  Edema or swelling: Yes  Fast heart beat: Yes  Headache: Yes  Bleeding or spotting between periods: Yes  Irregular periods: Yes  Vaginal discharge: Yes  Hot flashes: Yes      Objective:   BP (!) 165/104  Pulse 78  Ht 1.511 m (4' 11.5\")  Wt 48.2 kg (106 lb 3.2 oz)  BMI 21.09 kg/m2  General: pt appears healthy and well; no acute distress  HEENT: hair appears normal; mucous membranes moist; speech is clear; normocephalic   Neck: no lymphadenopathy   Thyroid:  no thyroid enlargement, nodularity, or tenderness  Cardiac: +3/6 holosystolic murmur loudest along LSB; normal S1/S2 and rhythm; no S3/S4, clicks, or rubs; capillary refill <2 sec  Resp: appropriate effort with air entry to lung bases; no increased work of breathing; breath sounds are symmetric; no crackles, wheezes, or rhonchi - lungs clear to auscultation bilaterally   Extremities: +fistula on left upper extremity; no edema or finger clubbing   Derm: no rash, skin lesions, or dryness on face, neck, hands or lower legs  Neuro: symmetric +2/4 biceps and patellar tendon reflexes; no slowing of speech or movements; pt ambulates normally  Psych: pt is appropriately conversational with appropriate affect    In House Labs:   No results found for this basename: a1c    TSH   Date Value Ref Range Status   12/29/2016 1.88 0.40 - 4.00 mU/L Final   04/06/2016 1.78 0.40 - 4.00 mU/L Final   12/15/2015 1.63 0.40 - 4.00 mU/L Final   09/01/2015 2.54 0.40 - 4.00 mU/L Final   11/04/2014 4.75 (H) 0.40 - 4.00 mU/L Final     Comment:     Effective 7/30/2014, the reference range for this assay has changed to reflect   new instrumentation/methodology.       T4 Free   Date Value Ref Range Status   04/06/2016 1.02 0.76 - 1.46 ng/dL Final   12/15/2015 1.05 0.76 - 1.46 ng/dL Final   09/01/2015 1.24 0.76 - 1.46 ng/dL Final   06/25/2015 1.2 0.7 - 1.8 ng/dL Final   04/16/2015 1.2 0.7 - 1.8 ng/dL Final       Creatinine   Date Value Ref Range Status "   12/29/2016 8.50 (H) 0.52 - 1.04 mg/dL Final   ]    Recent Labs   Lab Test  12/26/13   1007  12/11/13   0601   CHOL  208*  200   HDL  35*  29*   LDL  144*  156*   TRIG  144  76   CHOLHDLRATIO  5.9*  6.9*       No results found for: [id number], [id number], [id number]      Assessment and Plan:      Mona Wagner is a 26 year old year old woman with a PMHx of ESRD, galactorrhea 2/2 pituitary microadenoma, and hypothyroidism who presents for follow-up of her hypothyroidism and hyperprolactinemia.       1. Hypothyroidism: Patient has primary hypothyroidism that has been managed with levothyroxine.  She is currently on 37.5 mcg/day.  TSH was 1.88.  She is clinically euthyroid to slightly hyperthyroid, and would like to try a lower levothyroxine dose.  She felt well on 37.5 mcg 3 days a week (M, W and F) and 25 mcg 4 days a week.  Plasma levels of levothyroxine peak 2-4 hours after oral administration.  We also discussed taking the medication at an earlier time, although it is unlikely this is causing her symptoms.   Plan:  1. Repeat TFT today   2. Reduce levothyroxine dose to 37.5 mcg 3 days a week (M, W and F) and 25 mcg 4 days a week  3. Recheck TFTs in 6-8 weeks    2. Hyperprolactinemia   - likely 2/2 microadenoma due to persistent elevation of prolactin after hypothyroidism treatment  - initially measured adenoma to be 6.7mm x 5.3mm   - no change noted on last MRI (08/11/2016), but pituitary sequence likely not done  - imaging important as cabergoline may be started if adenoma grew  Plan:  1. Repeat MRI with pituitary sequence  3. Start cabergoline pending imaging  4. Consult neurosurgery if adenoma grows or pt has symptoms after cabergoline tx (galactorrhea, vision loss, infertility)    3. Oligomenorrhea  - ddx: chronic illness (ESRD) vs hypogonadism vs hypothyroidism +/- contraceptive pill effects  - pt symptoms of fatigue, hot flashes/hot temperature, decreased menstruation may be attributable to hypogonadism  - pt  had low-normal estrogen in past  Plan:  1. Will wait for PRL levels  2. Cannot reassess sex hormones until pt off of contraceptive pills     Test and/or medications prescribed by physician today:  Orders Placed This Encounter   Procedures     MRI Brain-PITUITARY w/o contrast     TSH     T4 free     T3 total     Prolactin       Nidhi Coles MD PhD    Division of Endocrinology and Diabetes

## 2017-02-21 NOTE — MR AVS SNAPSHOT
After Visit Summary   2/21/2017    Mona Wagner    MRN: [id number]           Patient Information     Date Of Birth          1990        Visit Information        Provider Department      2/21/2017 1:00 PM Jayde Herbert APRN Counts include 234 beds at the [facility] Solid [facility] Transplant        Today's Diagnoses     Organ transplant candidate    -  1    Complication of vascular access for dialysis, initial encounter (H)           Follow-ups after your visit        Your next 10 appointments already scheduled     May 30, 2017 12:15 PM CDT   (Arrive by 12:00 PM)   MR BRAIN W/O CONTRAST with [facility] ([facility])    [address]  1st Floor  [facility] [address] [phone]           Take your medicines as usual, unless your doctor tells you not to. Bring a list of your current medicines to your exam (including vitamins, minerals and over-the-counter drugs). Also bring the results of similar scans you may have had.  Please remove any body piercings and hair extensions before you arrive.  Follow your doctor s orders. If you do not, we may have to postpone your exam.  You will not have contrast for this exam. You do not need to do anything special to prepare.  The MRI machine uses a strong magnet. Please wear clothes without metal (snaps, zippers). A sweatsuit works well, or we may give you a hospital gown.   **IMPORTANT** THE INSTRUCTIONS BELOW ARE ONLY FOR THOSE PATIENTS WHO HAVE BEEN TOLD THEY WILL RECEIVE SEDATION OR GENERAL ANESTHESIA DURING THEIR MRI PROCEDURE:  IF YOU WILL RECEIVE SEDATION (take medicine to help you relax during your exam):   You must get the medicine from your doctor before you arrive. Bring the medicine to the exam. Do not take it at home.   Arrive one hour early. Bring someone who can take you home after the test. Your medicine will make you sleepy. After the exam, you may not drive, take a bus or take a taxi by yourself.   No eating 8  hours before your exam. You may have clear liquids up until 4 hours before your exam. (Clear liquids include water, clear tea, black coffee and fruit juice without pulp.)  IF YOU WILL RECEIVE ANESTHESIA (be asleep for your exam):   Arrive 1 1/2 hours early. Bring someone who can take you home after the test. You may not drive, take a bus or take a taxi by yourself.   No eating 8 hours before your exam. You may have clear liquids up until 4 hours before your exam. (Clear liquids include water, clear tea, black coffee and fruit juice without pulp.)   You will spend four to five hours in the recovery room.  Please call the Imaging Department at your exam site with any questions.            Jun 26, 2017 10:00 AM CDT   Return Visit with Theresa Sapp MD   [facility] ([facility])    [address] [place] [address], [address]  [facility] [id number] [phone]            Jul 11, 2017 12:30 PM CDT   (Arrive by 12:15 PM)   RETURN ENDOCRINE with Melissa Coles MD   [facility] ([facility])    [address]  3rd Floor  [facility] [address] [phone]              Future tests that were ordered for you today     Open Future Orders        Priority Expected Expires Ordered    MRI Brain-PITUITARY w/o contrast Routine  9/19/2017 2/21/2017            Who to contact     If you have questions or need follow up information about today's clinic visit or your schedule please contact [facility]ANSPL[facility] directly at [phone].  Normal or non-critical lab and imaging results will be communicated to you by MyChart, letter or phone within 4 business days after the clinic has received the results. If you do not hear from us within 7 days, please contact the clinic through MyChart or phone. If you have a critical or abnormal lab result, we will notify you by phone as soon as possible.  Submit refill requests through [employer] or call your  pharmacy and they will forward the refill request to us. Please allow 3 business days for your refill to be completed.          Additional Information About Your Visit        [employer] Information     [employer] gives you secure access to your electronic health record. If you see a primary care provider, you can also send messages to your care team and make appointments. If you have questions, please call your primary care clinic.  If you do not have a primary care provider, please call [phone] and they will assist you.        Care EveryWhere ID     This is your Care EveryWhere ID. This could be used by other organizations to access your [place] medical records  [id number]        Your Vitals Were     Pulse Temperature Respirations Pulse Oximetry          61 97.4  F (36.3  C) (Oral) 16 95%         Blood Pressure from Last 3 Encounters:   02/21/17 (!) 173/108   02/21/17 (!) 165/104   01/16/17 (!) 157/96    Weight from Last 3 Encounters:   02/21/17 48.2 kg (106 lb 3.2 oz)   01/16/17 48.8 kg (107 lb 9.6 oz)   12/29/16 48.2 kg (106 lb 3.2 oz)              Today, you had the following     No orders found for display         Today's Medication Changes          These changes are accurate as of: 2/21/17 11:59 PM.  If you have any questions, ask your nurse or doctor.               These medicines have changed or have updated prescriptions.        Dose/Directions    doxazosin 2 MG tablet   Commonly known as:  CARDURA   This may have changed:  medication strength   Used for:  Hypertension secondary to other renal disorders (CODE)   Changed by:  Delicia Parra MD        Dose:  2 mg   Take 1 tablet (2 mg) by mouth At Bedtime   Quantity:  90 tablet   Refills:  3       levothyroxine 25 MCG tablet   Commonly known as:  SYNTHROID/LEVOTHROID   This may have changed:    - how much to take  - how to take this  - when to take this  - additional instructions   Used for:  Acquired hypothyroidism   Changed by:  Melissa Coles  Nidhi Lee MD        Take 1 tablet (25 mcg) Tuesday, Thursday, Saturday and Sunday and 1.5 tablets (37.5 mcg) on Monday, Wednesday and Friday every week.   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        Dose:  20 mg   Take 1 tablet (20 mg) by mouth daily   Quantity:  90 tablet   Refills:  3            Where to get your medicines      These medications were sent to [id number] - [place], [place] - [address]  [address] [facility] [address]     Phone:  [phone]     doxazosin 2 MG tablet    levothyroxine 25 MCG tablet                Primary Care Provider Office Phone # Fax #    Macarena Jackie Bowen [phone]       [facility] [address] [address]        Thank you!     Thank you for choosing [facility]  for your care. Our goal is always to provide you with excellent care. Hearing back from our patients is one way we can continue to improve our services. Please take a few minutes to complete the written survey that you may receive in the mail after your visit with us. Thank you!             Your Updated Medication List - Protect others around you: Learn how to safely use, store and throw away your medicines at www.disposemymeds.org.          This list is accurate as of: 2/21/17 11:59 PM.  Always use your most recent med list.                   Brand Name Dispense Instructions for use    acetaminophen 325 MG tablet    TYLENOL    100 tablet    Take 2 tablets (650 mg) by mouth every 4 hours as needed for mild pain       amLODIPine 5 MG tablet    NORVASC    180 tablet    Take 2 tablets (10 mg) by mouth daily Take one tablet twice a day.       B complex-C-folic acid 1 MG capsule     30 capsule    Take 1 capsule (1 mg) by mouth daily       doxazosin 2 MG tablet    CARDURA    90  tablet    Take 1 tablet (2 mg) by mouth At Bedtime       levETIRAcetam 250 MG tablet    KEPPRA    60 tablet    Take  1 1/2 tabs daily.  Take an additional 250 mg after dialysis on M, W, F.       levothyroxine 25 MCG tablet    SYNTHROID/LEVOTHROID    135 tablet    Take 1 tablet (25 mcg) Tuesday, Thursday, Saturday and Sunday and 1.5 tablets (37.5 mcg) on Monday, Wednesday and Friday every week.       lisinopril 20 MG tablet    PRINIVIL/ZESTRIL    90 tablet    Take 1 tablet (20 mg) by mouth daily       norethindrone 0.35 MG per tablet    MICRONOR    28 tablet    Take 1 tablet (0.35 mg) by mouth daily       ondansetron 4 MG tablet    ZOFRAN    18 tablet    Take 1 tablet (4 mg) by mouth every 8 hours as needed for nausea       sevelamer 800 MG tablet    RENVELA     Take 800 mg by mouth 3 times daily (with meals)

## 2017-02-22 DIAGNOSIS — Z99.2 ESRD ON DIALYSIS (H): Primary | ICD-10-CM

## 2017-02-22 DIAGNOSIS — T82.9XXA COMPLICATION OF VASCULAR ACCESS FOR DIALYSIS, INITIAL ENCOUNTER: ICD-10-CM

## 2017-02-22 DIAGNOSIS — N18.6 ESRD ON DIALYSIS (H): Primary | ICD-10-CM

## 2017-02-22 LAB — PRA SINGLE ANTIGEN IGG ANTIBODY: NORMAL

## 2017-02-23 DIAGNOSIS — G40.909 SEIZURE DISORDER (H): Primary | ICD-10-CM

## 2017-02-24 ENCOUNTER — DOCUMENTATION ONLY (OUTPATIENT)
Dept: NEPHROLOGY | Facility: CLINIC | Age: 27
End: 2017-02-24

## 2017-03-01 LAB
SA1 CELL: NORMAL
SA1 COMMENTS: NORMAL
SA1 HI RISK ABY: NORMAL
SA1 MOD RISK ABY: NORMAL
SA1 TEST METHOD: NORMAL
SA2 CELL: NORMAL
SA2 COMMENTS: NORMAL
SA2 HI RISK ABY UA: NORMAL
SA2 MOD RISK ABY: NORMAL
SA2 TEST METHOD: NORMAL

## 2017-03-10 DIAGNOSIS — K59.00 CONSTIPATION, UNSPECIFIED CONSTIPATION TYPE: Primary | ICD-10-CM

## 2017-03-10 DIAGNOSIS — I31.39 PERICARDIAL EFFUSION: ICD-10-CM

## 2017-03-10 RX ORDER — ASPIRIN 81 MG
100 TABLET, DELAYED RELEASE (ENTERIC COATED) ORAL DAILY
Qty: 60 TABLET | Refills: 1 | Status: SHIPPED | OUTPATIENT
Start: 2017-03-10 | End: 2018-01-17

## 2017-03-13 DIAGNOSIS — Z76.82 ORGAN TRANSPLANT CANDIDATE: ICD-10-CM

## 2017-03-14 ENCOUNTER — TELEPHONE (OUTPATIENT)
Dept: INTERVENTIONAL RADIOLOGY/VASCULAR | Facility: CLINIC | Age: 27
End: 2017-03-14

## 2017-03-16 ENCOUNTER — APPOINTMENT (OUTPATIENT)
Dept: INTERVENTIONAL RADIOLOGY/VASCULAR | Facility: CLINIC | Age: 27
End: 2017-03-16
Attending: CLINICAL NURSE SPECIALIST
Payer: MEDICARE

## 2017-03-16 ENCOUNTER — HOSPITAL ENCOUNTER (OUTPATIENT)
Facility: CLINIC | Age: 27
Discharge: HOME | End: 2017-03-16
Attending: SURGERY | Admitting: SURGERY
Payer: MEDICARE

## 2017-03-16 ENCOUNTER — APPOINTMENT (OUTPATIENT)
Dept: MEDSURG UNIT | Facility: CLINIC | Age: 27
End: 2017-03-16
Attending: SURGERY
Payer: MEDICARE

## 2017-03-16 VITALS
HEART RATE: 80 BPM | WEIGHT: 105 LBS | SYSTOLIC BLOOD PRESSURE: 115 MMHG | HEIGHT: 60 IN | DIASTOLIC BLOOD PRESSURE: 74 MMHG | RESPIRATION RATE: 16 BRPM | OXYGEN SATURATION: 97 % | BODY MASS INDEX: 20.62 KG/M2 | TEMPERATURE: 98.4 F

## 2017-03-16 DIAGNOSIS — Z99.2 ESRD ON DIALYSIS (H): ICD-10-CM

## 2017-03-16 DIAGNOSIS — T82.9XXA COMPLICATION OF VASCULAR ACCESS FOR DIALYSIS, INITIAL ENCOUNTER: ICD-10-CM

## 2017-03-16 DIAGNOSIS — N18.6 ESRD ON DIALYSIS (H): ICD-10-CM

## 2017-03-16 LAB
ERYTHROCYTE [DISTWIDTH] IN BLOOD BY AUTOMATED COUNT: 12.5 % (ref 10–15)
GLUCOSE BLDC GLUCOMTR-MCNC: 80 MG/DL (ref 70–99)
HCG SERPL QL: NEGATIVE
HCT VFR BLD AUTO: 36 % (ref 35–47)
HGB BLD-MCNC: 11.9 G/DL (ref 11.7–15.7)
INR BLD: <0.9 (ref 0.86–1.14)
MCH RBC QN AUTO: 31.1 PG (ref 26.5–33)
MCHC RBC AUTO-ENTMCNC: 33.1 G/DL (ref 31.5–36.5)
MCV RBC AUTO: 94 FL (ref 78–100)
PLATELET # BLD AUTO: 134 10E9/L (ref 150–450)
POTASSIUM SERPL-SCNC: 4 MMOL/L (ref 3.4–5.3)
PRA SINGLE ANTIGEN IGG ANTIBODY: NORMAL
RBC # BLD AUTO: 3.83 10E12/L (ref 3.8–5.2)
WBC # BLD AUTO: 3.9 10E9/L (ref 4–11)

## 2017-03-16 PROCEDURE — C1769 GUIDE WIRE: HCPCS

## 2017-03-16 PROCEDURE — 85027 COMPLETE CBC AUTOMATED: CPT | Performed by: RADIOLOGY

## 2017-03-16 PROCEDURE — 36901 INTRO CATH DIALYSIS CIRCUIT: CPT

## 2017-03-16 PROCEDURE — 25500064 ZZH RX 255 OP 636: Performed by: SURGERY

## 2017-03-16 PROCEDURE — 99152 MOD SED SAME PHYS/QHP 5/>YRS: CPT | Mod: 59

## 2017-03-16 PROCEDURE — 25000125 ZZHC RX 250: Performed by: RADIOLOGY

## 2017-03-16 PROCEDURE — 27210805 ZZH SHEATH CR4

## 2017-03-16 PROCEDURE — 76937 US GUIDE VASCULAR ACCESS: CPT

## 2017-03-16 PROCEDURE — 84132 ASSAY OF SERUM POTASSIUM: CPT | Performed by: RADIOLOGY

## 2017-03-16 PROCEDURE — 85610 PROTHROMBIN TIME: CPT | Mod: QW

## 2017-03-16 PROCEDURE — 99153 MOD SED SAME PHYS/QHP EA: CPT | Mod: 59

## 2017-03-16 PROCEDURE — 25000128 H RX IP 250 OP 636: Performed by: RADIOLOGY

## 2017-03-16 PROCEDURE — 27210732 ZZH ACCESSORY CR1

## 2017-03-16 PROCEDURE — 84703 CHORIONIC GONADOTROPIN ASSAY: CPT | Performed by: RADIOLOGY

## 2017-03-16 PROCEDURE — 27210995 ZZH RX 272: Performed by: RADIOLOGY

## 2017-03-16 PROCEDURE — 82962 GLUCOSE BLOOD TEST: CPT

## 2017-03-16 PROCEDURE — 27210905 ZZH KIT CR7

## 2017-03-16 PROCEDURE — C1887 CATHETER, GUIDING: HCPCS

## 2017-03-16 PROCEDURE — 40000166 ZZH STATISTIC PP CARE STAGE 1

## 2017-03-16 RX ORDER — NALOXONE HYDROCHLORIDE 0.4 MG/ML
.1-.4 INJECTION, SOLUTION INTRAMUSCULAR; INTRAVENOUS; SUBCUTANEOUS
Status: DISCONTINUED | OUTPATIENT
Start: 2017-03-16 | End: 2017-03-16 | Stop reason: HOSPADM

## 2017-03-16 RX ORDER — FLUMAZENIL 0.1 MG/ML
0.2 INJECTION, SOLUTION INTRAVENOUS
Status: DISCONTINUED | OUTPATIENT
Start: 2017-03-16 | End: 2017-03-16 | Stop reason: HOSPADM

## 2017-03-16 RX ORDER — FENTANYL CITRATE 50 UG/ML
25-50 INJECTION, SOLUTION INTRAMUSCULAR; INTRAVENOUS EVERY 5 MIN PRN
Status: DISCONTINUED | OUTPATIENT
Start: 2017-03-16 | End: 2017-03-16 | Stop reason: HOSPADM

## 2017-03-16 RX ORDER — SODIUM CHLORIDE 9 MG/ML
INJECTION, SOLUTION INTRAVENOUS CONTINUOUS
Status: DISCONTINUED | OUTPATIENT
Start: 2017-03-16 | End: 2017-03-16 | Stop reason: HOSPADM

## 2017-03-16 RX ORDER — IODIXANOL 320 MG/ML
50 INJECTION, SOLUTION INTRAVASCULAR ONCE
Status: COMPLETED | OUTPATIENT
Start: 2017-03-16 | End: 2017-03-16

## 2017-03-16 RX ORDER — LIDOCAINE 40 MG/G
CREAM TOPICAL
Status: DISCONTINUED | OUTPATIENT
Start: 2017-03-16 | End: 2017-03-16 | Stop reason: HOSPADM

## 2017-03-16 RX ADMIN — LIDOCAINE HYDROCHLORIDE 5 ML: 10 INJECTION, SOLUTION EPIDURAL; INFILTRATION; INTRACAUDAL; PERINEURAL at 11:52

## 2017-03-16 RX ADMIN — MIDAZOLAM 0.5 MG: 1 INJECTION INTRAMUSCULAR; INTRAVENOUS at 11:37

## 2017-03-16 RX ADMIN — FENTANYL CITRATE 50 MCG: 50 INJECTION, SOLUTION INTRAMUSCULAR; INTRAVENOUS at 11:25

## 2017-03-16 RX ADMIN — FENTANYL CITRATE 25 MCG: 50 INJECTION, SOLUTION INTRAMUSCULAR; INTRAVENOUS at 11:37

## 2017-03-16 RX ADMIN — MIDAZOLAM 1 MG: 1 INJECTION INTRAMUSCULAR; INTRAVENOUS at 11:24

## 2017-03-16 RX ADMIN — MIDAZOLAM 1 MG: 1 INJECTION INTRAMUSCULAR; INTRAVENOUS at 11:03

## 2017-03-16 RX ADMIN — IODIXANOL 40 ML: 320 INJECTION, SOLUTION INTRAVASCULAR at 11:51

## 2017-03-16 RX ADMIN — FENTANYL CITRATE 50 MCG: 50 INJECTION, SOLUTION INTRAMUSCULAR; INTRAVENOUS at 11:03

## 2017-03-16 NOTE — PROGRESS NOTES
Interventional Radiology Intra-procedural Nursing Note    Patient Name: Mona Wagner  Medical Record Number: [id number]  Today's Date: March 16, 2017    Start Time: Sedation start 1103, procedure time out 1124  End of procedure time: 1150  Procedure: Dialysis Fistulagram left arm  Report given to: 2A RN  Time pt departs:  1205  Provider: MD Honey, MD Agata, MD Jeison    Other Notes: Pt in IR 4, ID, procedure and consent verified. Pt transferred to fluoroscopy table, positioned, prepped and monitored per protocol.   Dialysis fistula accessed left forearm, sheath removed, pressure held, tip-stop dressing applied.   Pt tolerated procedure well, vitals stable, minimal sedation given with versed 2.5 mg, fentanyl 125 mcg, sedation time 45 minutes.        MANPREET SALTER

## 2017-03-16 NOTE — PROGRESS NOTES
1215  Returned to 2A from IR per cart accompanied by RN.  S/P Left lower arm fistulogram.  Site is FDI with TipStop in place.  No hematoma or drainage noted. Left Arm elevated on pillow.  1230  Nutrition of turkey sand, soda, & water taken well.  No nausea.  1300  Family member here.  Discharge instructions reviewed with pt.  Verbally demonstrates understanding of instructions.  1315  Up, ambulated in [place].  Stable on feet.  IV DC'd intact and pressure dressing applied to site.  CTRN

## 2017-03-16 NOTE — IP AVS SNAPSHOT
[address] [address]                                       After Visit Summary   3/16/2017    Mona Wagner    MRN: [id number]           After Visit Summary Signature Page     I have received my discharge instructions, and my questions have been answered. I have discussed any challenges I see with this plan with the nurse or doctor.    ..........................................................................................................................................  Patient/Patient Representative Signature      ..........................................................................................................................................  Patient Representative Print Name and Relationship to Patient    ..................................................               ................................................  Date                                            Time    ..........................................................................................................................................  Reviewed by Signature/Title    ...................................................              ..............................................  Date                                                            Time

## 2017-03-16 NOTE — PROGRESS NOTES
Interventional Radiology Pre-Procedure Sedation Assessment   Time of Assessment: 10:32 AM    Expected Level: Moderate Sedation    Indication: Sedation is required for the following type of Procedure: Left arm fistulogram    Sedation and procedural consent: Risks, benefits and alternatives were discussed with Patient    PO Intake: Appropriately NPO for procedure    ASA Class: Class 1 - HEALTHY PATIENT    Mallampati: Grade 2:  Soft palate, base of uvula, tonsillar pillars, and portion of posterior pharyngeal wall visible    Lungs: Lungs Clear with good breath sounds bilaterally    Heart: Systolic mumur.  Normal Rate    History and physical reviewed and no updates needed. I have reviewed the lab findings, diagnostic data, medications, and the plan for sedation. I have determined this patient to be an appropriate candidate for the planned sedation and procedure and have reassessed the patient IMMEDIATELY PRIOR to sedation and procedure.    Shivam Flores MD

## 2017-03-16 NOTE — DISCHARGE INSTRUCTIONS
[facility]      Interventional Radiology  Discharge Instructions Following Fistulogram      ? You may resume normal activities as tolerated, but avoid any strenuous activity or heavy lifting (>10 lbs.) involving your access arm.    ? Elevate and rest your arm as much as possible for the first 24 hours after the procedure.  This will promote healing and reduce swelling.     ? If bleeding or oozing should occur, apply fingertip pressure to puncture site to control bleeding, but avoid excessive pressure as this may clot off the fistula.  Hold light pressure for 10 minutes, or until bleeding/oozing is controlled.  If excessive bleeding is noted or if you are having difficulty controlling the bleeding with direct pressure, call 911.     ? If you develop fever, chills, excessive pain/tenderness or drainage at the puncture site, or have questions call your Doctor or Dialysis Center.     ? If you received sedation for your procedure: An adult should stay with you for 24 hours, Do Not drive a motor vehicle, operate machinery, or drink alcoholic beverages for 24 hours.     ? You may resume your normal medications immediately    ? If you notice sudden loss of pulse or thrill (buzzing feeling) in your fistula, contact your Doctor or Dialysis unit immediately.     Additional Instructions:  None      [facility] INTERVENTIONAL RADIOLOGY DEPARTMENT  Procedure Physician: Dr. Galindo                                                          Date of procedure: March 16, 2017 Thursday  Telephone Numbers: [phone] Monday-Friday 8:00 am to 4:30 pm  [phone] After 4:30 pm Monday-Friday, Weekends & Holidays.   Ask for the Interventional Radiologist on call.  Someone is on call 24 hrs/day  [facility] toll free number: [phone] Monday-Friday 8:00 am to 4:30 pm  [facility] Emergency Dept: [phone]

## 2017-03-16 NOTE — IP AVS SNAPSHOT
MRN:[id number]                      After Visit Summary   3/16/2017    Mona Wagner    MRN: [id number]           Visit Information        Department      3/16/2017  9:31 AM [address] [facility]          Review of your medicines      UNREVIEWED medicines. Ask your doctor about these medicines        Dose / Directions    acetaminophen 325 MG tablet   Commonly known as:  TYLENOL   Used for:  Back pain, unspecified back location, unspecified back pain laterality, unspecified chronicity        Dose:  650 mg   Take 2 tablets (650 mg) by mouth every 4 hours as needed for mild pain   Quantity:  100 tablet   Refills:  1       amLODIPine 5 MG tablet   Commonly known as:  NORVASC   Used for:  Renal hypertension, stage 5 chronic kidney disease or end stage renal disease (H)        Dose:  10 mg   Take 2 tablets (10 mg) by mouth daily Take one tablet twice a day.   Quantity:  180 tablet   Refills:  3       B complex-C-folic acid 1 MG capsule   Used for:  ESRD needing dialysis (H)        Dose:  1 capsule   Take 1 capsule (1 mg) by mouth daily   Quantity:  30 capsule   Refills:  3       docusate sodium 100 MG tablet   Commonly known as:  COLACE   Used for:  Constipation, unspecified constipation type        Dose:  100 mg   Take 100 mg by mouth daily   Quantity:  60 tablet   Refills:  1       doxazosin 2 MG tablet   Commonly known as:  CARDURA   Used for:  Hypertension secondary to other renal disorders (CODE)        Dose:  2 mg   Take 1 tablet (2 mg) by mouth At Bedtime   Quantity:  90 tablet   Refills:  3       levETIRAcetam 250 MG tablet   Commonly known as:  KEPPRA   Used for:  Partial symptomatic epilepsy with complex partial seizures, not intractable, without status epilepticus (H)        Take  1 1/2 tabs daily.  Take an additional 250 mg after dialysis on M, W, F.   Quantity:  60 tablet   Refills:  5       levothyroxine 25 MCG tablet   Commonly known as:  SYNTHROID/LEVOTHROID   Used for:  Acquired  hypothyroidism        Take 1 tablet (25 mcg) Tuesday, Thursday, Saturday and Sunday and 1.5 tablets (37.5 mcg) on Monday, Wednesday and Friday every week.   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        Dose:  20 mg   Take 1 tablet (20 mg) by mouth daily   Quantity:  90 tablet   Refills:  3       norethindrone 0.35 MG per tablet   Commonly known as:  MICRONOR   Used for:  Surveillance of previously prescribed contraceptive pill        Dose:  1 tablet   Take 1 tablet (0.35 mg) by mouth daily   Quantity:  28 tablet   Refills:  3       ondansetron 4 MG tablet   Commonly known as:  ZOFRAN   Used for:  Nausea        Dose:  4 mg   Take 1 tablet (4 mg) by mouth every 8 hours as needed for nausea   Quantity:  18 tablet   Refills:  0       sevelamer 800 MG tablet   Commonly known as:  RENVELA   Indication:  Taking 4 tabs 3 times daily        Dose:  800 mg   Take 800 mg by mouth 3 times daily (with meals)   Refills:  0                Protect others around you: Learn how to safely use, store and throw away your medicines at www.disposemymeds.org.         Follow-ups after your visit        Your next 10 appointments already scheduled     Mar 21, 2017  2:30 PM CDT   Ech Complete with [address] [facility])    [address] [place] [id number] [phone]           1.  Please bring or wear a comfortable two-piece outfit. 2.  You may eat, drink and take your normal medicines. 3.  For any questions that cannot be answered, please contact the ordering physician            May 30, 2017 12:15 PM CDT   (Arrive by 12:00 PM)   MR BRAIN W/O CONTRAST with [address] [facility] ([facility])    [address] [place] [address] [phone]           Take your medicines as usual, unless  your doctor tells you not to. Bring a list of your current medicines to your exam (including vitamins, minerals and over-the-counter drugs). Also bring the results of similar scans you may have had.  Please remove any body piercings and hair extensions before you arrive.  Follow your doctor s orders. If you do not, we may have to postpone your exam.  You will not have contrast for this exam. You do not need to do anything special to prepare.  The MRI machine uses a strong magnet. Please wear clothes without metal (snaps, zippers). A sweatsuit works well, or we may give you a hospital gown.   **IMPORTANT** THE INSTRUCTIONS BELOW ARE ONLY FOR THOSE PATIENTS WHO HAVE BEEN TOLD THEY WILL RECEIVE SEDATION OR GENERAL ANESTHESIA DURING THEIR MRI PROCEDURE:  IF YOU WILL RECEIVE SEDATION (take medicine to help you relax during your exam):   You must get the medicine from your doctor before you arrive. Bring the medicine to the exam. Do not take it at home.   Arrive one hour early. Bring someone who can take you home after the test. Your medicine will make you sleepy. After the exam, you may not drive, take a bus or take a taxi by yourself.   No eating 8 hours before your exam. You may have clear liquids up until 4 hours before your exam. (Clear liquids include water, clear tea, black coffee and fruit juice without pulp.)  IF YOU WILL RECEIVE ANESTHESIA (be asleep for your exam):   Arrive 1 1/2 hours early. Bring someone who can take you home after the test. You may not drive, take a bus or take a taxi by yourself.   No eating 8 hours before your exam. You may have clear liquids up until 4 hours before your exam. (Clear liquids include water, clear tea, black coffee and fruit juice without pulp.)   You will spend four to five hours in the recovery room.  Please call the Imaging Department at your exam site with any questions.            Jun 26, 2017 10:00 AM CDT   Return Visit with MD [facility] ([facility])    [address], [address]  [facility] [address]   [phone]            Jul 11, 2017 12:30 PM CDT   (Arrive by 12:15 PM)   RETURN ENDOCRINE with Melissa Coles MD   [facility] Endocrinology ([facility])    [address]  3rd Floor  [facility] [address] [phone]               Care Instructions        Further instructions from your care team         [facility]      Interventional Radiology  Discharge Instructions Following Fistulogram      ? You may resume normal activities as tolerated, but avoid any strenuous activity or heavy lifting (>10 lbs.) involving your access arm.    ? Elevate and rest your arm as much as possible for the first 24 hours after the procedure.  This will promote healing and reduce swelling.     ? If bleeding or oozing should occur, apply fingertip pressure to puncture site to control bleeding, but avoid excessive pressure as this may clot off the fistula.  Hold light pressure for 10 minutes, or until bleeding/oozing is controlled.  If excessive bleeding is noted or if you are having difficulty controlling the bleeding with direct pressure, call 911.     ? If you develop fever, chills, excessive pain/tenderness or drainage at the puncture site, or have questions call your Doctor or Dialysis Center.     ? If you received sedation for your procedure: An adult should stay with you for 24 hours, Do Not drive a motor vehicle, operate machinery, or drink alcoholic beverages for 24 hours.     ? You may resume your normal medications immediately    ? If you notice sudden loss of pulse or thrill (buzzing feeling) in your fistula, contact your Doctor or Dialysis unit immediately.     Additional Instructions:  None      [facility] INTERVENTIONAL RADIOLOGY DEPARTMENT  Procedure Physician: Dr. Galindo                                                          Date of procedure: March 16, 2017   Thursday  Telephone Numbers: [phone] Monday-Friday 8:00 am to 4:30 pm  [phone] After 4:30 pm Monday-Friday, Weekends & Holidays.   Ask for the Interventional Radiologist on call.  Someone is on call 24 hrs/day  [facility] toll free number: [phone] Monday-Friday 8:00 am to 4:30 pm  [facility] Emergency Dept: [phone]             Additional Information About Your Visit        [facility] Information     [email or web address] gives you secure access to your electronic health record. If you see a primary care provider, you can also send messages to your care team and make appointments. If you have questions, please call your primary care clinic.  If you do not have a primary care provider, please call [phone] and they will assist you.        Care EveryWhere ID     This is your Care EveryWhere ID. This could be used by other organizations to access your [facility] medical records  [id number]        Your Vitals Were     Blood Pressure Pulse Temperature Respirations Height Weight    122/86 (BP Location: Right arm) 80 98.4  F (36.9  C) (Oral) 16 1.524 m (5') 47.6 kg (105 lb)    Pulse Oximetry BMI (Body Mass Index)                96% 20.51 kg/m2           Primary Care Provider Office Phone # Fax #    Macarena Jackie Bowen [phone]      Thank you!     Thank you for choosing [facility] for your care. Our goal is always to provide you with excellent care. Hearing back from our patients is one way we can continue to improve our services. Please take a few minutes to complete the written survey that you may receive in the mail after you visit with us. Thank you!             Medication List: This is a list of all your medications and when to take them. Check marks below indicate your daily home schedule. Keep this list as a reference.      Medications           Morning Afternoon Evening Bedtime As Needed    acetaminophen 325 MG tablet   Commonly known as:  TYLENOL   Take 2 tablets (650 mg) by mouth every 4 hours as  needed for mild pain                                amLODIPine 5 MG tablet   Commonly known as:  NORVASC   Take 2 tablets (10 mg) by mouth daily Take one tablet twice a day.                                B complex-C-folic acid 1 MG capsule   Take 1 capsule (1 mg) by mouth daily                                docusate sodium 100 MG tablet   Commonly known as:  COLACE   Take 100 mg by mouth daily                                doxazosin 2 MG tablet   Commonly known as:  CARDURA   Take 1 tablet (2 mg) by mouth At Bedtime                                levETIRAcetam 250 MG tablet   Commonly known as:  KEPPRA   Take  1 1/2 tabs daily.  Take an additional 250 mg after dialysis on M, W, F.                                levothyroxine 25 MCG tablet   Commonly known as:  SYNTHROID/LEVOTHROID   Take 1 tablet (25 mcg) Tuesday, Thursday, Saturday and Sunday and 1.5 tablets (37.5 mcg) on Monday, Wednesday and Friday every week.                                lisinopril 20 MG tablet   Commonly known as:  PRINIVIL/ZESTRIL   Take 1 tablet (20 mg) by mouth daily                                norethindrone 0.35 MG per tablet   Commonly known as:  MICRONOR   Take 1 tablet (0.35 mg) by mouth daily                                ondansetron 4 MG tablet   Commonly known as:  ZOFRAN   Take 1 tablet (4 mg) by mouth every 8 hours as needed for nausea                                sevelamer 800 MG tablet   Commonly known as:  RENVELA   Take 800 mg by mouth 3 times daily (with meals)

## 2017-03-16 NOTE — PROGRESS NOTES
Patient tolerated recovery stage well. VSS, left arm fistula site clean/dry/intact with Tipstop in place, no hematoma, and denies pain. Patient tolerated PO food and fluids. Teaching was done and discharge instructions were given. Patient ambulated, voided, and PIV was removed. Patient discharged from the hospital via wheel chair to home with .

## 2017-04-21 DIAGNOSIS — R10.13 DYSPEPSIA: Primary | ICD-10-CM

## 2017-04-27 DIAGNOSIS — Z30.41 SURVEILLANCE OF PREVIOUSLY PRESCRIBED CONTRACEPTIVE PILL: ICD-10-CM

## 2017-04-27 RX ORDER — ACETAMINOPHEN AND CODEINE PHOSPHATE 120; 12 MG/5ML; MG/5ML
1 SOLUTION ORAL DAILY
Qty: 28 TABLET | Refills: 11 | Status: SHIPPED | OUTPATIENT
Start: 2017-04-27 | End: 2018-05-21

## 2017-05-16 ENCOUNTER — RADIANT APPOINTMENT (OUTPATIENT)
Dept: CARDIOLOGY | Facility: CLINIC | Age: 27
End: 2017-05-16
Attending: INTERNAL MEDICINE

## 2017-05-16 DIAGNOSIS — I31.39 PERICARDIAL EFFUSION: ICD-10-CM

## 2017-06-12 ENCOUNTER — RESULTS ONLY (OUTPATIENT)
Dept: OTHER | Facility: CLINIC | Age: 27
End: 2017-06-12

## 2017-06-12 DIAGNOSIS — Z76.82 ORGAN TRANSPLANT CANDIDATE: ICD-10-CM

## 2017-06-12 PROCEDURE — 86833 HLA CLASS II HIGH DEFIN QUAL: CPT | Performed by: INTERNAL MEDICINE

## 2017-06-12 PROCEDURE — 86832 HLA CLASS I HIGH DEFIN QUAL: CPT | Performed by: INTERNAL MEDICINE

## 2017-06-15 DIAGNOSIS — E03.9 ACQUIRED HYPOTHYROIDISM: Primary | ICD-10-CM

## 2017-06-16 LAB — PRA SINGLE ANTIGEN IGG ANTIBODY: NORMAL

## 2017-07-03 DIAGNOSIS — N18.6 ESRD (END STAGE RENAL DISEASE) ON DIALYSIS (H): Primary | ICD-10-CM

## 2017-07-03 DIAGNOSIS — Z99.2 ESRD (END STAGE RENAL DISEASE) ON DIALYSIS (H): Primary | ICD-10-CM

## 2017-07-06 ENCOUNTER — OFFICE VISIT (OUTPATIENT)
Dept: NEUROLOGY | Facility: CLINIC | Age: 27
End: 2017-07-06

## 2017-07-06 VITALS
SYSTOLIC BLOOD PRESSURE: 157 MMHG | BODY MASS INDEX: 20.62 KG/M2 | HEART RATE: 88 BPM | DIASTOLIC BLOOD PRESSURE: 104 MMHG | HEIGHT: 60 IN | WEIGHT: 105 LBS

## 2017-07-06 DIAGNOSIS — E03.8 OTHER SPECIFIED HYPOTHYROIDISM: Primary | ICD-10-CM

## 2017-07-06 DIAGNOSIS — G40.209 PARTIAL SYMPTOMATIC EPILEPSY WITH COMPLEX PARTIAL SEIZURES, NOT INTRACTABLE, WITHOUT STATUS EPILEPTICUS (H): ICD-10-CM

## 2017-07-06 DIAGNOSIS — G40.909 SEIZURE DISORDER (H): ICD-10-CM

## 2017-07-06 DIAGNOSIS — E03.9 ACQUIRED HYPOTHYROIDISM: ICD-10-CM

## 2017-07-06 DIAGNOSIS — G40.909 SEIZURE DISORDER (H): Primary | ICD-10-CM

## 2017-07-06 LAB
LEVETIRACETAM SERPL-MCNC: 19.8 UG/ML (ref 6–46)
T3FREE SERPL-MCNC: 2.2 PG/ML (ref 2.3–4.2)
T4 SERPL-MCNC: 9.6 UG/DL (ref 4.5–13.9)

## 2017-07-06 RX ORDER — LEVETIRACETAM 250 MG/1
TABLET ORAL
Qty: 60 TABLET | Refills: 5 | Status: SHIPPED | OUTPATIENT
Start: 2017-07-06 | End: 2018-02-22

## 2017-07-06 NOTE — PROGRESS NOTES
[facility]      Patient:  Mona Wagner  :  1990   Age:  26 year old   Today's Office Visit:  2017      History of Present Illness:     The patient did not have any seizures since last visit.  In last December, we decreased Keppra to 375 mg daily and 250 mg after dialysis.  She did not have any seizures.  Her last seizures was in 2014, when she was diagnosed with seizures.  MRI on 2017 which I reviewed again showed hemosiderin deposit in right frontal lobe, no change.      Current Outpatient Prescriptions   Medication Sig Dispense Refill     EMLA cream APPLY TO ACCESS SITE 30 TO 60 MINUTES PRIOR TO        DIALYSIS 30 g 11     norethindrone (MICRONOR) 0.35 MG per tablet Take 1 tablet (0.35 mg) by mouth daily 28 tablet 11     omeprazole (PRILOSEC) 20 MG CR capsule Take 1 capsule (20 mg) by mouth daily 30 capsule 3     ondansetron (ZOFRAN ODT) 4 MG ODT tab Take 1-2 tablets (4-8 mg) by mouth every 8 hours as needed for nausea 60 tablet 1     docusate sodium (COLACE) 100 MG tablet Take 100 mg by mouth daily 60 tablet 1     levothyroxine (SYNTHROID/LEVOTHROID) 25 MCG tablet Take 1 tablet (25 mcg) Tuesday, Thursday, Saturday and  and 1.5 tablets (37.5 mcg) on Monday, Wednesday and Friday every week. 135 tablet 3     acetaminophen (TYLENOL) 325 MG tablet Take 2 tablets (650 mg) by mouth every 4 hours as needed for mild pain 100 tablet 1     amLODIPine (NORVASC) 5 MG tablet Take 2 tablets (10 mg) by mouth daily Take one tablet twice a day. 180 tablet 3     levETIRAcetam (KEPPRA) 250 MG tablet Take  1 1/2 tabs daily.  Take an additional 250 mg after dialysis on , , . 60 tablet 5     lisinopril (PRINIVIL/ZESTRIL) 20 MG tablet Take 1 tablet (20 mg) by mouth daily (Patient taking differently: Take 40 mg by mouth daily ) 90 tablet 3     sevelamer (RENVELA) 800 MG tablet Take 800 mg by mouth 3 times daily (with meals)        B complex-C-folic acid (NEPHROCAPS) 1 MG  capsule Take 1 capsule (1 mg) by mouth daily 30 capsule 3      Perceived AED Side Effects:  Sleepiness, lethargies    Results for LIZZETTE PALMER (MRN [id number]) as of 7/6/2017 10:50   Ref. Range 12/20/2016 11:58   Levetiracetam Level Unknown 21.1     Medication Notes:        AED Medication Compliance:  compliant most of the time  Using a pill box:  Yes    Review of Systems:  Lethargy / Tiredness:  Yes  Nausea / Vomiting:  No  Double Vision:  No  Sleepiness:  Yes  Depression:  No  Slowed Cognitive Function:  No  Memory Problems:  No  Poor Balance:  No  Dizziness:  No  Appetite Changes:  No  Blurred Vision:  No  Sleep Problems: yes  Behavioral Changes:  No  Skin: negative  Respiratory: No shortness of breath and No cough  Cardiovascular: negative  Have you experienced a traumatic fall since your last visit: NO    Other Issues:    Is patient safe to drive:  Yes    Woman Care:   Patient is:    Sexually Active:  Yes  Type of Birth Control:  pill  Pregnant:  no.  Planning to become pregnant:  No  Currently Breastfeeding:  No    Exam:    BP (!) 157/104  Pulse 88  Ht 5' (152.4 cm)  Wt 105 lb (47.6 kg)  LMP  (LMP Unknown)  Breastfeeding? No  BMI 20.51 kg/m2     Wt Readings from Last 5 Encounters:   07/06/17 105 lb (47.6 kg)   03/16/17 105 lb (47.6 kg)   02/21/17 106 lb 3.2 oz (48.2 kg)   01/16/17 107 lb 9.6 oz (48.8 kg)   12/29/16 106 lb 3.2 oz (48.2 kg)     General Appearance: Alert, awake, cooperative and pleasant, NAD  Gait and tandem gait steady  Attention Span: Normal   Language: No aphasia or dysarthria  Extraocular Movements: intact  Coordination: Normal FNF  Facial Sensation: Normal  Facial Strength: Normal  Tongue Strength: Normal  Limb exam: 5/5 bilaterally with normal tone and bulk, no pronator drift    Assessment and Plan:     Localization-related epilepsy, due to right frontal SAH.  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  Blood is irritative to the brain, so I advised to  continue Keppra.        - Continue  mg daily, and 250 mg after dialysis   - Obtain Keppra level today  - RTC in 6 m      As described above, I met with the patient for 25 minutes and during this time counseling was greater than 50% of the visit time.  Theresa Sapp MD

## 2017-07-06 NOTE — LETTER
2017       RE: Mona Wagner  : 1990   MRN: [id number]      Dear Colleague,    Thank you for referring your patient, Mona Wagner, to the [facility] EPILEPSY CARE at [facility]. Please see a copy of my visit note below.    [facility]      Patient:  Mona Wagner  :  1990   Age:  26 year old   Today's Office Visit:  2017      History of Present Illness:     The patient did not have any seizures since last visit.  In last December, we decreased Keppra to 375 mg daily and 250 mg after dialysis.  She did not have any seizures.  Her last seizures was in 2014, when she was diagnosed with seizures.  MRI on 2017 which I reviewed again showed hemosiderin deposit in right frontal lobe, no change.      Current Outpatient Prescriptions   Medication Sig Dispense Refill     EMLA cream APPLY TO ACCESS SITE 30 TO 60 MINUTES PRIOR TO        DIALYSIS 30 g 11     norethindrone (MICRONOR) 0.35 MG per tablet Take 1 tablet (0.35 mg) by mouth daily 28 tablet 11     omeprazole (PRILOSEC) 20 MG CR capsule Take 1 capsule (20 mg) by mouth daily 30 capsule 3     ondansetron (ZOFRAN ODT) 4 MG ODT tab Take 1-2 tablets (4-8 mg) by mouth every 8 hours as needed for nausea 60 tablet 1     docusate sodium (COLACE) 100 MG tablet Take 100 mg by mouth daily 60 tablet 1     levothyroxine (SYNTHROID/LEVOTHROID) 25 MCG tablet Take 1 tablet (25 mcg) Tuesday, Thursday, Saturday and  and 1.5 tablets (37.5 mcg) on Monday, Wednesday and Friday every week. 135 tablet 3     acetaminophen (TYLENOL) 325 MG tablet Take 2 tablets (650 mg) by mouth every 4 hours as needed for mild pain 100 tablet 1     amLODIPine (NORVASC) 5 MG tablet Take 2 tablets (10 mg) by mouth daily Take one tablet twice a day. 180 tablet 3     levETIRAcetam (KEPPRA) 250 MG tablet Take  1 1/2 tabs daily.  Take an additional 250 mg after dialysis on , , . 60 tablet 5     lisinopril  (PRINIVIL/ZESTRIL) 20 MG tablet Take 1 tablet (20 mg) by mouth daily (Patient taking differently: Take 40 mg by mouth daily ) 90 tablet 3     sevelamer (RENVELA) 800 MG tablet Take 800 mg by mouth 3 times daily (with meals)        B complex-C-folic acid (NEPHROCAPS) 1 MG capsule Take 1 capsule (1 mg) by mouth daily 30 capsule 3      Perceived AED Side Effects:  Sleepiness, lethargies    Results for LIZZETTE PALMER (MRN [id number]) as of 7/6/2017 10:50   Ref. Range 12/20/2016 11:58   Levetiracetam Level Unknown 21.1     Medication Notes:        AED Medication Compliance:  compliant most of the time  Using a pill box:  Yes    Review of Systems:  Lethargy / Tiredness:  Yes  Nausea / Vomiting:  No  Double Vision:  No  Sleepiness:  Yes  Depression:  No  Slowed Cognitive Function:  No  Memory Problems:  No  Poor Balance:  No  Dizziness:  No  Appetite Changes:  No  Blurred Vision:  No  Sleep Problems: yes  Behavioral Changes:  No  Skin: negative  Respiratory: No shortness of breath and No cough  Cardiovascular: negative  Have you experienced a traumatic fall since your last visit: NO    Other Issues:    Is patient safe to drive:  Yes    Woman Care:   Patient is:    Sexually Active:  Yes  Type of Birth Control:  pill  Pregnant:  no.  Planning to become pregnant:  No  Currently Breastfeeding:  No    Exam:    BP (!) 157/104  Pulse 88  Ht 5' (152.4 cm)  Wt 105 lb (47.6 kg)  LMP  (LMP Unknown)  Breastfeeding? No  BMI 20.51 kg/m2     Wt Readings from Last 5 Encounters:   07/06/17 105 lb (47.6 kg)   03/16/17 105 lb (47.6 kg)   02/21/17 106 lb 3.2 oz (48.2 kg)   01/16/17 107 lb 9.6 oz (48.8 kg)   12/29/16 106 lb 3.2 oz (48.2 kg)     General Appearance: Alert, awake, cooperative and pleasant, NAD  Gait and tandem gait steady  Attention Span: Normal   Language: No aphasia or dysarthria  Extraocular Movements: intact  Coordination: Normal FNF  Facial Sensation: Normal  Facial Strength: Normal  Tongue Strength: Normal  Limb exam:  5/5 bilaterally with normal tone and bulk, no pronator drift    Assessment and Plan:     Localization-related epilepsy, due to right frontal SAH.  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  Blood is irritative to the brain, so I advised to continue Keppra.        - Continue  mg daily, and 250 mg after dialysis   - Obtain Keppra level today  - RTC in 6 m      As described above, I met with the patient for 25 minutes and during this time counseling was greater than 50% of the visit time.  Theresa Sapp MD

## 2017-07-06 NOTE — MR AVS SNAPSHOT
After Visit Summary   7/6/2017    Mona Wagner    MRN: [id number]           Patient Information     Date Of Birth          1990        Visit Information        Provider Department      7/6/2017 10:30 AM Theresa Sapp MD [facility]        Today's Diagnoses     Other specified hypothyroidism    -  1    Partial symptomatic epilepsy with complex partial seizures, not intractable, without status epilepticus (H)           Follow-ups after your visit        Follow-up notes from your care team     Return in about 6 months (around 1/6/2018).      Your next 10 appointments already scheduled     Jul 06, 2017  2:00 PM CDT   LAB VISIT with PV [facility]   [facility] ([facility])    [address] [place] [address]   [phone]           If you are coming in for fasting labs, you will need to fast for 10-12 hours prior to your appt. Fasting labs include lipids, cholesterol, glucose, complete metabolic panel, basic metabolic panel, and triglycerides. Do not drink coffee or any other fluids. Water with medications are okay. Do not chew gum as well. If you have any further questions, please contact your health care team.                Oct 03, 2017 12:00 PM CDT   (Arrive by 11:45 AM)   RETURN ENDOCRINE with Melissa Coles MD   [facility] ([facility])    [address] [place] [address] [phone]              Who to contact     Please call your clinic at [phone] to:    Ask questions about your health    Make or cancel appointments    Discuss your medicines    Learn about your test results    Speak to your doctor   If you have compliments or concerns about an experience at your clinic, or if you wish to file a complaint, please contact [facility] Patient Relations at [phone] or email us at [email or web address]         Additional Information  About Your Visit        [email or web address] Information     [employer] gives you secure access to your electronic health record. If you see a primary care provider, you can also send messages to your care team and make appointments. If you have questions, please call your primary care clinic.  If you do not have a primary care provider, please call [phone] and they will assist you.      [employer] is an electronic gateway that provides easy, online access to your medical records. With [employer], you can request a clinic appointment, read your test results, renew a prescription or communicate with your care team.     To access your existing account, please contact your [facility] or call [phone] for assistance.        Care EveryWhere ID     This is your Care EveryWhere ID. This could be used by other organizations to access your [facility] medical records  [id number]        Your Vitals Were     Pulse Height Last Period Breastfeeding? BMI (Body Mass Index)       88 5' (152.4 cm) (LMP Unknown) No 20.51 kg/m2        Blood Pressure from Last 3 Encounters:   07/06/17 (!) 157/104   03/16/17 115/74   02/21/17 (!) 173/108    Weight from Last 3 Encounters:   07/06/17 105 lb (47.6 kg)   03/16/17 105 lb (47.6 kg)   02/21/17 106 lb 3.2 oz (48.2 kg)              We Performed the Following     Keppra (Levetiracetam) Level     TSH with free T4 reflex          Today's Medication Changes          These changes are accurate as of: 7/6/17 11:25 AM.  If you have any questions, ask your nurse or doctor.               These medicines have changed or have updated prescriptions.        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        Dose:  20 mg   Take 1 tablet (20 mg) by mouth daily   Quantity:  90 tablet   Refills:  3            Where to get your medicines      These medications  were sent to [facility], [place] - [address]  [address], [place] [address]     Phone:  [phone]     levETIRAcetam 250 MG tablet                Primary Care Provider Office Phone # Fax #    Macarena Jackie Bowen [phone]       [facility] [address] [address]        Equal Access to Services     RUCHI VILLAGRAN : Hadii aad ku hadasho Soomaali, waaxda luqadaha, qaybta kaalmada adeegyada, waxay idiin hayaan adeeg kharash la'aan . So [facility] [phone].    ATENCIÓN: Si habla español, tiene a gerber disposición servicios gratuitos de asistencia lingüística. [facility] [phone].    We comply with applicable federal civil rights laws and [place] laws. We do not discriminate on the basis of race, color, national origin, age, disability sex, sexual orientation or gender identity.            Thank you!     Thank you for choosing [facility]  for your care. Our goal is always to provide you with excellent care. Hearing back from our patients is one way we can continue to improve our services. Please take a few minutes to complete the written survey that you may receive in the mail after your visit with us. Thank you!             Your Updated Medication List - Protect others around you: Learn how to safely use, store and throw away your medicines at www.disposemymeds.org.          This list is accurate as of: 7/6/17 11:25 AM.  Always use your most recent med list.                   Brand Name Dispense Instructions for use Diagnosis    acetaminophen 325 MG tablet    TYLENOL    100 tablet    Take 2 tablets (650 mg) by mouth every 4 hours as needed for mild pain    Back pain, unspecified back location, unspecified back pain laterality, unspecified chronicity       amLODIPine 5 MG tablet    NORVASC    180 tablet    Take 2 tablets (10 mg) by mouth daily Take one tablet twice a day.    Renal hypertension, stage 5 chronic kidney disease or end stage  renal disease (H)       B complex-C-folic acid 1 MG capsule     30 capsule    Take 1 capsule (1 mg) by mouth daily    ESRD needing dialysis (H)       docusate sodium 100 MG tablet    COLACE    60 tablet    Take 100 mg by mouth daily    Constipation, unspecified constipation type       EMLA cream   Generic drug:  lidocaine-prilocaine     30 g    APPLY TO ACCESS SITE 30 TO 60 MINUTES PRIOR TO        DIALYSIS    ESRD (end stage renal disease) on dialysis (H)       levETIRAcetam 250 MG tablet    KEPPRA    60 tablet    Take  1 1/2 tabs daily.  Take an additional 250 mg after dialysis on M, W, F.    Partial symptomatic epilepsy with complex partial seizures, not intractable, without status epilepticus (H)       levothyroxine 25 MCG tablet    SYNTHROID/LEVOTHROID    135 tablet    Take 1 tablet (25 mcg) Tuesday, Thursday, Saturday and Sunday and 1.5 tablets (37.5 mcg) on Monday, Wednesday and Friday every week.    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

## 2017-07-07 LAB
SA1 CELL: NORMAL
SA1 COMMENTS: NORMAL
SA1 HI RISK ABY: NORMAL
SA1 MOD RISK ABY: NORMAL
SA1 TEST METHOD: NORMAL
SA2 CELL: NORMAL
SA2 COMMENTS: NORMAL
SA2 HI RISK ABY UA: NORMAL
SA2 MOD RISK ABY: NORMAL
SA2 TEST METHOD: NORMAL
T4 FREE SERPL-MCNC: 1.08 NG/DL (ref 0.76–1.46)
TSH SERPL DL<=0.05 MIU/L-ACNC: 2.52 MU/L (ref 0.4–4)

## 2017-07-11 ENCOUNTER — DOCUMENTATION ONLY (OUTPATIENT)
Dept: FAMILY MEDICINE | Facility: CLINIC | Age: 27
End: 2017-07-11

## 2017-07-11 LAB
KEPPRA (LEVETIRACETAM) LEVEL: 19.8 UG/ML (ref 6–46)
T4 FREE SERPL-MCNC: 1.08 NG/DL
TSH SERPL-ACNC: 2.52 M[IU]/L

## 2017-07-11 NOTE — PROGRESS NOTES
From:  [facility] [address] [place] [address]  P: [phone]  F: [phone]      To: [facility]    Attn: Dr. Sapp    Date: 7/11/2017    RE: Mona Wagner 1990 MRN [id number]      RESULTS FOR YOUR REVIEW:    Orders Only on 07/06/2017   Component Date Value Ref Range Status     LEVETIRACETAM 07/06/2017 19.8  6.0 - 46.0 ug/mL Final       COMMENTS:   TSH: 2.52     T4 Free: 1.08       Thanks,  Macarena Bowen    Results faxed by Brionna Neal Tech

## 2017-07-18 ENCOUNTER — TELEPHONE (OUTPATIENT)
Dept: FAMILY MEDICINE | Facility: CLINIC | Age: 27
End: 2017-07-18

## 2017-07-18 NOTE — TELEPHONE ENCOUNTER
Patient returned call. Patient informed of information below. Patient states her pharmacy has now changed to [facility] on [address] Pharmacy updated in chart. Patient states she still has some meds on hands and takes only when needed. Per pt still has a refill.     Patient informed that Dr. Bowen will not pursue prior auth for the Ondansetron. Patient needs to schedule an appointment to see MD to complete this if medication is still needed. Patient informed that from the PA request, she has reached the max quantity allowed and will probably need a prior auth. Patient states she will call back to schedule a follow up with Dr. Bowen when she runs out and needs more.

## 2017-07-18 NOTE — TELEPHONE ENCOUNTER
Received prior auth request for Ondansetron 4 mg ODT from [facility].    Consulted with Dr. Bowen, has not seen pt other than receiving refill requests from patient. Per Dr. Bowen, patient needs an appointment with MD to complete this.    No prior auth initiated. [facility] has been notified of this.    Called patient, no answer. Left message for patient to call back at clinic to discuss medications.

## 2017-09-11 ENCOUNTER — RESULTS ONLY (OUTPATIENT)
Dept: OTHER | Facility: CLINIC | Age: 27
End: 2017-09-11

## 2017-09-11 ENCOUNTER — APPOINTMENT (OUTPATIENT)
Dept: LAB | Facility: CLINIC | Age: 27
End: 2017-09-11
Attending: TRANSPLANT SURGERY
Payer: MEDICARE

## 2017-09-11 PROCEDURE — 86832 HLA CLASS I HIGH DEFIN QUAL: CPT | Performed by: INTERNAL MEDICINE

## 2017-09-11 PROCEDURE — 86833 HLA CLASS II HIGH DEFIN QUAL: CPT | Performed by: INTERNAL MEDICINE

## 2017-09-21 DIAGNOSIS — N18.6 END STAGE RENAL DISEASE (H): ICD-10-CM

## 2017-09-21 DIAGNOSIS — Z76.82 ORGAN TRANSPLANT CANDIDATE: Primary | ICD-10-CM

## 2017-09-29 ASSESSMENT — ENCOUNTER SYMPTOMS
HYPERTENSION: 1
SORE THROAT: 1
NECK PAIN: 0
SMELL DISTURBANCE: 0
HOARSE VOICE: 0
SYNCOPE: 0
JOINT SWELLING: 0
RECTAL PAIN: 0
PALPITATIONS: 0
ORTHOPNEA: 0
TASTE DISTURBANCE: 0
STIFFNESS: 0
MUSCLE WEAKNESS: 0
NAUSEA: 0
ABDOMINAL PAIN: 1
BACK PAIN: 1
CONSTIPATION: 1
SINUS PAIN: 0
MUSCLE CRAMPS: 1
NECK MASS: 0
DIARRHEA: 1
MYALGIAS: 1
HYPOTENSION: 1
BOWEL INCONTINENCE: 0
EXERCISE INTOLERANCE: 0
LIGHT-HEADEDNESS: 0
BLOOD IN STOOL: 0
SINUS CONGESTION: 0
TACHYCARDIA: 1
VOMITING: 0
JAUNDICE: 0
ARTHRALGIAS: 1
RECTAL BLEEDING: 0
HEARTBURN: 1
BLOATING: 1
LEG SWELLING: 1
TROUBLE SWALLOWING: 0
LEG PAIN: 0
CLAUDICATION: 0
SLEEP DISTURBANCES DUE TO BREATHING: 0

## 2017-10-03 ENCOUNTER — TELEPHONE (OUTPATIENT)
Dept: NEPHROLOGY | Facility: CLINIC | Age: 27
End: 2017-10-03

## 2017-10-03 ENCOUNTER — OFFICE VISIT (OUTPATIENT)
Dept: ENDOCRINOLOGY | Facility: CLINIC | Age: 27
End: 2017-10-03

## 2017-10-03 VITALS
HEART RATE: 76 BPM | WEIGHT: 108 LBS | BODY MASS INDEX: 21.2 KG/M2 | SYSTOLIC BLOOD PRESSURE: 156 MMHG | DIASTOLIC BLOOD PRESSURE: 98 MMHG | HEIGHT: 60 IN

## 2017-10-03 DIAGNOSIS — E03.9 ACQUIRED HYPOTHYROIDISM: ICD-10-CM

## 2017-10-03 DIAGNOSIS — N64.3 GALACTORRHEA: Primary | ICD-10-CM

## 2017-10-03 RX ORDER — SIMETHICONE 125 MG
125 TABLET,CHEWABLE ORAL 2 TIMES DAILY
COMMUNITY
End: 2018-12-10

## 2017-10-03 RX ORDER — CINACALCET 30 MG/1
60 TABLET, FILM COATED ORAL DAILY
COMMUNITY
End: 2019-01-29

## 2017-10-03 ASSESSMENT — PAIN SCALES - GENERAL: PAINLEVEL: MILD PAIN (3)

## 2017-10-03 NOTE — LETTER
10/3/2017       RE: Mona Wagner  [address]  [place] [address]     Dear Colleague,    Thank you for referring your patient, Mona Wagner, to the [facility] at [facility]. Please see a copy of my visit note below.    Student Endocrinology Note    Chief Complaint: Hyperprolactinemia   Information obtained from:Patient    Subjective:         HPI: Mona Wagner is a 26 year old year old woman with PMHx of ESRD on dialysis, galactorrhea 2/2 pituitary microadenoma, and hypothyroidism who presents for follow-up of her hypothyroidism and hyperprolactinemia. I last saw her in February 2017.    Briefly, Mona had galactorrhea that resolved after correction of her hypothyroidism with levothyroxine. She still had an elevated prolactin (PRL= 43) despite resolution of the galactorrhea. She had a repeat MRI of the brain, that reports no changes in pituitary imaging as compared to previous image study. This was not a pituitary dedicated MRI.  She has not had galactorrhea since starting thyroid treatment. She takes a contraceptive pill in addition to thyroid medication. She has never seen a neurosurgeon regarding her pituitary mass.    Mona is feeling well and denies changes in her peripheral vision. She is currently on levothyroxine 25 mg 4x/week and 37.5 mcg 3x/week.      Ms. Wagner has IgA nephropathy. She has ESRD and is on hemodialysis.  She is on the transplant list.      Past Medical History:   Diagnosis Date     Anemia in chronic kidney disease      Dialysis patient (H)      End stage renal disease on dialysis (H)      Hypothyroid      Pituitary adenoma (H)        Past Surgical History:   Procedure Laterality Date     CREATE FISTULA ARTERIOVENOUS UPPER EXTREMITY  1/2/2014    Procedure: CREATE FISTULA ARTERIOVENOUS UPPER EXTREMITY;  Left Wrist Arteriovenous Fistula Placement;  Surgeon: Shashi Castro MD;  Location: [facility] OR     [place]ow/DIALYSIS CATHETER  12/10/2013             Current Outpatient Prescriptions   Medication Sig Dispense Refill     cinacalcet (SENSIPAR) 30 MG tablet Take 30 mg by mouth daily       simethicone (MYLICON) 125 MG CHEW chewable tablet Take 125 mg by mouth 2 times daily       levETIRAcetam (KEPPRA) 250 MG tablet Take  1 1/2 tabs daily.  Take an additional 250 mg after dialysis on M, W, F. 60 tablet 5     EMLA cream APPLY TO ACCESS SITE 30 TO 60 MINUTES PRIOR TO        DIALYSIS 30 g 11     norethindrone (MICRONOR) 0.35 MG per tablet Take 1 tablet (0.35 mg) by mouth daily 28 tablet 11     omeprazole (PRILOSEC) 20 MG CR capsule Take 1 capsule (20 mg) by mouth daily 30 capsule 3     ondansetron (ZOFRAN ODT) 4 MG ODT tab Take 1-2 tablets (4-8 mg) by mouth every 8 hours as needed for nausea 60 tablet 1     docusate sodium (COLACE) 100 MG tablet Take 100 mg by mouth daily 60 tablet 1     levothyroxine (SYNTHROID/LEVOTHROID) 25 MCG tablet Take 1 tablet (25 mcg) Tuesday, Thursday, Saturday and Sunday and 1.5 tablets (37.5 mcg) on Monday, Wednesday and Friday every week. 135 tablet 3     acetaminophen (TYLENOL) 325 MG tablet Take 2 tablets (650 mg) by mouth every 4 hours as needed for mild pain 100 tablet 1     amLODIPine (NORVASC) 5 MG tablet Take 2 tablets (10 mg) by mouth daily Take one tablet twice a day. 180 tablet 3     lisinopril (PRINIVIL/ZESTRIL) 20 MG tablet Take 1 tablet (20 mg) by mouth daily (Patient taking differently: Take 10 mg by mouth 2 times daily ) 90 tablet 3     sevelamer (RENVELA) 800 MG tablet Take 800 mg by mouth 3 times daily (with meals)        B complex-C-folic acid (NEPHROCAPS) 1 MG capsule Take 1 capsule (1 mg) by mouth daily 30 capsule 3          Allergies   Allergen Reactions     Chlorhexidine Rash     Rash at site     Sulfa Drugs Rash     Muscle stiffness of neck     Alcohol Swabs [Isopropyl Alcohol] Rash       Family History   Problem Relation Age of Onset     Hypertension Sister      KIDNEY DISEASE Mother      KIDNEY DISEASE Sister       DIABETES No family hx of      Coronary Artery Disease No family hx of      Breast Cancer No family hx of      Cancer - colorectal No family hx of      Ovarian Cancer No family hx of      Prostate Cancer No family hx of      Other Cancer No family hx of      CEREBROVASCULAR DISEASE No family hx of      Asthma No family hx of        Social History     Social History     Marital Status:      Spouse Name: N/A     Number of Children: N/A     Years of Education: N/A     Social History Main Topics     Smoking status: Never Smoker      Smokeless tobacco: Never Used     Alcohol Use: No     Drug Use: No     Sexual Activity:     Partners: Male     Other Topics Concern     None     Social History Narrative    Date of Service:5/15/2013     Name: Mona VALDOVINOS (Month, Day, Year of birth): 1990     MRN: [id number]     New Patient: Yes    Preferred Language: English     Needed: No    County of Residence: [place]    Marital Status:     Household size: 8    Number of Dependents:0     Pregnant: 0    Average Monthly Income: $ 600    Citizenship Status: Citizen    Gave Pt [employer] and [employer] applications       Review of Systems   11 Point ROS is as detailed in the HPI above, as below or negative        Objective:   BP (!) 156/98  Pulse 76  Ht 1.524 m (5')  Wt 49 kg (108 lb)  BMI 21.09 kg/m2  General: pt appears healthy and well; no acute distress  HEENT: hair appears normal; mucous membranes moist; speech is clear; normocephalic   Neck: no lymphadenopathy   Thyroid:  no thyroid enlargement, nodularity, or tenderness  Cardiac: +3/6 holosystolic murmur loudest along LSB; normal S1/S2 and rhythm; no S3/S4, clicks, or rubs; capillary refill <2 sec  Resp: appropriate effort with air entry to lung bases; no increased work of breathing; breath sounds are symmetric; no crackles, wheezes, or rhonchi - lungs clear to auscultation bilaterally   Extremities: +fistula on left upper extremity; no edema or  finger clubbing   Derm: no rash, skin lesions, or dryness on face, neck, hands or lower legs  Neuro: symmetric +2/4 biceps and patellar tendon reflexes; no slowing of speech or movements; pt ambulates normally  Psych: pt is appropriately conversational with appropriate affect    In House Labs:   No results found for this basename: a1c    TSH   Date Value Ref Range Status   07/06/2017 2.52 0.40 - 4.00 mU/L Final   07/06/2017 2.52  Final   02/21/2017 1.64 0.40 - 4.00 mU/L Final   12/29/2016 1.88 0.40 - 4.00 mU/L Final   04/06/2016 1.78 0.40 - 4.00 mU/L Final     T4 Total   Date Value Ref Range Status   07/06/2017 9.6 4.5 - 13.9 ug/dL Final     T4 Free   Date Value Ref Range Status   07/06/2017 1.08 0.76 - 1.46 ng/dL Final   07/06/2017 1.08  Final   02/21/2017 1.11 0.76 - 1.46 ng/dL Final   04/06/2016 1.02 0.76 - 1.46 ng/dL Final   12/15/2015 1.05 0.76 - 1.46 ng/dL Final       Creatinine   Date Value Ref Range Status   12/29/2016 8.50 (H) 0.52 - 1.04 mg/dL Final   ]    Recent Labs   Lab Test  12/26/13   1007  12/11/13   0601   CHOL  208*  200   HDL  35*  29*   LDL  144*  156*   TRIG  144  76   CHOLHDLRATIO  5.9*  6.9*       No results found for: [id number], [id number], [id number]      Assessment and Plan:      Mona Wagner is a 26 year old year old woman with a PMHx of ESRD, galactorrhea 2/2 pituitary microadenoma, and hypothyroidism who presents for follow-up of her hypothyroidism and hyperprolactinemia.       1. Hypothyroidism: Patient has primary hypothyroidism that has been managed with levothyroxine.  She is currently alternating 25 and 37.5 mcg/day as detailed in the HPI above.  TSH is wnl.  Patient is clinically and biochemically euthyroid.     Plan:  1. Repeat TFT today   2. Continue current levothyroxine dose    2. Hyperprolactinemia   - likely 2/2 microadenoma due to persistent elevation of prolactin after hypothyroidism treatment  - initially measured adenoma to be 6.7mm x 5.3mm   - no change noted on last  MRI, however no pituitary sequence was done  - imaging important as cabergoline may be started if adenoma grows  Plan:  1. Will repeat PRL  2. Consider repeating MRI with pituitary sequence  3. Consider cabergoline pending imaging  4. Consult neurosurgery if adenoma grows or pt has symptoms after cabergoline tx (galactorrhea, vision loss, infertility)    3. Oligomenorrhea  - ddx: chronic illness (ESRD) vs hypogonadism vs hypothyroidism +/- contraceptive pill effects.  Patient is aware that we can not properly access gonadothropin levels while she is on birth control pills.  Will check PRL.    Test and/or medications prescribed by physician today:  Orders Placed This Encounter   Procedures     Prolactin     TSH     T4 free     I will contact the patient with the test results and see her back in clinic in 6 months or sooner if needed.    Nidhi Coles MD PhD    Division of Endocrinology and Diabetes

## 2017-10-03 NOTE — TELEPHONE ENCOUNTER
Patient stopped by clinic regarding Transit evaluation forms to be filled out. Writer called [phone] provided by patient. MA # [phone]. Forms to be fazed to [phone].  Tia Cervantes LPN  Nephrology  Clinics and Surgery [facility]  [phone]

## 2017-10-03 NOTE — PROGRESS NOTES
Student Endocrinology Note    Chief Complaint: Hyperprolactinemia   Information obtained from:Patient    Subjective:         HPI: Mona Wagner is a 26 year old year old woman with PMHx of ESRD on dialysis, galactorrhea 2/2 pituitary microadenoma, and hypothyroidism who presents for follow-up of her hypothyroidism and hyperprolactinemia. I last saw her in February 2017.    Briefly, Mona had galactorrhea that resolved after correction of her hypothyroidism with levothyroxine. She still had an elevated prolactin (PRL= 43) despite resolution of the galactorrhea. She had a repeat MRI of the brain, that reports no changes in pituitary imaging as compared to previous image study. This was not a pituitary dedicated MRI.  She has not had galactorrhea since starting thyroid treatment. She takes a contraceptive pill in addition to thyroid medication. She has never seen a neurosurgeon regarding her pituitary mass.    Mona is feeling well and denies changes in her peripheral vision. She is currently on levothyroxine 25 mg 4x/week and 37.5 mcg 3x/week.      Ms. Wagner has IgA nephropathy. She has ESRD and is on hemodialysis.  She is on the transplant list.      Past Medical History:   Diagnosis Date     Anemia in chronic kidney disease      Dialysis patient (H)      End stage renal disease on dialysis (H)      Hypothyroid      Pituitary adenoma (H)        Past Surgical History:   Procedure Laterality Date     CREATE FISTULA ARTERIOVENOUS UPPER EXTREMITY  1/2/2014    Procedure: CREATE FISTULA ARTERIOVENOUS UPPER EXTREMITY;  Left Wrist Arteriovenous Fistula Placement;  Surgeon: Shashi Castro MD;  Location:  OR     [place]/DIALYSIS CATHETER  12/10/2013            Current Outpatient Prescriptions   Medication Sig Dispense Refill     cinacalcet (SENSIPAR) 30 MG tablet Take 30 mg by mouth daily       simethicone (MYLICON) 125 MG CHEW chewable tablet Take 125 mg by mouth 2 times daily       levETIRAcetam (KEPPRA) 250 MG tablet Take  1  1/2 tabs daily.  Take an additional 250 mg after dialysis on M, W, F. 60 tablet 5     EMLA cream APPLY TO ACCESS SITE 30 TO 60 MINUTES PRIOR TO        DIALYSIS 30 g 11     norethindrone (MICRONOR) 0.35 MG per tablet Take 1 tablet (0.35 mg) by mouth daily 28 tablet 11     omeprazole (PRILOSEC) 20 MG CR capsule Take 1 capsule (20 mg) by mouth daily 30 capsule 3     ondansetron (ZOFRAN ODT) 4 MG ODT tab Take 1-2 tablets (4-8 mg) by mouth every 8 hours as needed for nausea 60 tablet 1     docusate sodium (COLACE) 100 MG tablet Take 100 mg by mouth daily 60 tablet 1     levothyroxine (SYNTHROID/LEVOTHROID) 25 MCG tablet Take 1 tablet (25 mcg) Tuesday, Thursday, Saturday and Sunday and 1.5 tablets (37.5 mcg) on Monday, Wednesday and Friday every week. 135 tablet 3     acetaminophen (TYLENOL) 325 MG tablet Take 2 tablets (650 mg) by mouth every 4 hours as needed for mild pain 100 tablet 1     amLODIPine (NORVASC) 5 MG tablet Take 2 tablets (10 mg) by mouth daily Take one tablet twice a day. 180 tablet 3     lisinopril (PRINIVIL/ZESTRIL) 20 MG tablet Take 1 tablet (20 mg) by mouth daily (Patient taking differently: Take 10 mg by mouth 2 times daily ) 90 tablet 3     sevelamer (RENVELA) 800 MG tablet Take 800 mg by mouth 3 times daily (with meals)        B complex-C-folic acid (NEPHROCAPS) 1 MG capsule Take 1 capsule (1 mg) by mouth daily 30 capsule 3          Allergies   Allergen Reactions     Chlorhexidine Rash     Rash at site     Sulfa Drugs Rash     Muscle stiffness of neck     Alcohol Swabs [Isopropyl Alcohol] Rash       Family History   Problem Relation Age of Onset     Hypertension Sister      KIDNEY DISEASE Mother      KIDNEY DISEASE Sister      DIABETES No family hx of      Coronary Artery Disease No family hx of      Breast Cancer No family hx of      Cancer - colorectal No family hx of      Ovarian Cancer No family hx of      Prostate Cancer No family hx of      Other Cancer No family hx of      CEREBROVASCULAR  DISEASE No family hx of      Asthma No family hx of        Social History     Social History     Marital Status:      Spouse Name: N/A     Number of Children: N/A     Years of Education: N/A     Social History Main Topics     Smoking status: Never Smoker      Smokeless tobacco: Never Used     Alcohol Use: No     Drug Use: No     Sexual Activity:     Partners: Male     Other Topics Concern     None     Social History Narrative    Date of Service:5/15/2013     Name: Mona VALDOVINOS (Month, Day, Year of birth): 1990     MRN: [id number]     New Patient: Yes    Preferred Language: English     Needed: No    County of Residence: [place]    Marital Status:     Household size: 8    Number of Dependents:0     Pregnant: 0    Average Monthly Income: $ 600    Citizenship Status: Citizen    Gave Pt [employer] and [employer] applications       Review of Systems   11 Point ROS is as detailed in the HPI above, as below or negative  Answers for HPI/ROS submitted by the patient on 2017   General Symptoms: No  Skin Symptoms: No  HENT Symptoms: Yes  EYE SYMPTOMS: No  HEART SYMPTOMS: Yes  LUNG SYMPTOMS: No  INTESTINAL SYMPTOMS: Yes  URINARY SYMPTOMS: No  GYNECOLOGIC SYMPTOMS: No  BREAST SYMPTOMS: No  SKELETAL SYMPTOMS: Yes  BLOOD SYMPTOMS: No  NERVOUS SYSTEM SYMPTOMS: No  MENTAL HEALTH SYMPTOMS: No  Sore throat: Yes  Dry mouth: Yes  Chest pain or pressure: Yes  Swelling in feet or ankles: Yes  High blood pressure: Yes  Low blood pressure: Yes  Chest pain at rest: Yes  Edema or swelling: Yes  Fast heart beat: Yes  Heart burn or indigestion: Yes  Abdominal pain: Yes  Bloating: Yes  Constipation: Yes  Diarrhea: Yes  Change in stools: Yes  Back pain: Yes  Muscle aches: Yes  Joint pain: Yes  Muscle cramps: Yes        Objective:   BP (!) 156/98  Pulse 76  Ht 1.524 m (5')  Wt 49 kg (108 lb)  BMI 21.09 kg/m2  General: pt appears healthy and well; no acute distress  HEENT: hair appears normal; mucous membranes  moist; speech is clear; normocephalic   Neck: no lymphadenopathy   Thyroid:  no thyroid enlargement, nodularity, or tenderness  Cardiac: +3/6 holosystolic murmur loudest along LSB; normal S1/S2 and rhythm; no S3/S4, clicks, or rubs; capillary refill <2 sec  Resp: appropriate effort with air entry to lung bases; no increased work of breathing; breath sounds are symmetric; no crackles, wheezes, or rhonchi - lungs clear to auscultation bilaterally   Extremities: +fistula on left upper extremity; no edema or finger clubbing   Derm: no rash, skin lesions, or dryness on face, neck, hands or lower legs  Neuro: symmetric +2/4 biceps and patellar tendon reflexes; no slowing of speech or movements; pt ambulates normally  Psych: pt is appropriately conversational with appropriate affect    In House Labs:   No results found for this basename: a1c    TSH   Date Value Ref Range Status   07/06/2017 2.52 0.40 - 4.00 mU/L Final   07/06/2017 2.52  Final   02/21/2017 1.64 0.40 - 4.00 mU/L Final   12/29/2016 1.88 0.40 - 4.00 mU/L Final   04/06/2016 1.78 0.40 - 4.00 mU/L Final     T4 Total   Date Value Ref Range Status   07/06/2017 9.6 4.5 - 13.9 ug/dL Final     T4 Free   Date Value Ref Range Status   07/06/2017 1.08 0.76 - 1.46 ng/dL Final   07/06/2017 1.08  Final   02/21/2017 1.11 0.76 - 1.46 ng/dL Final   04/06/2016 1.02 0.76 - 1.46 ng/dL Final   12/15/2015 1.05 0.76 - 1.46 ng/dL Final       Creatinine   Date Value Ref Range Status   12/29/2016 8.50 (H) 0.52 - 1.04 mg/dL Final   ]    Recent Labs   Lab Test  12/26/13   1007  12/11/13   0601   CHOL  208*  200   HDL  35*  29*   LDL  144*  156*   TRIG  144  76   CHOLHDLRATIO  5.9*  6.9*       No results found for: [id number], [id number], [id number]      Assessment and Plan:      Mnoa Wagner is a 26 year old year old woman with a PMHx of ESRD, galactorrhea 2/2 pituitary microadenoma, and hypothyroidism who presents for follow-up of her hypothyroidism and hyperprolactinemia.       1.  Hypothyroidism: Patient has primary hypothyroidism that has been managed with levothyroxine.  She is currently alternating 25 and 37.5 mcg/day as detailed in the HPI above.  TSH is wnl.  Patient is clinically and biochemically euthyroid.     Plan:  1. Repeat TFT today   2. Continue current levothyroxine dose    2. Hyperprolactinemia   - likely 2/2 microadenoma due to persistent elevation of prolactin after hypothyroidism treatment  - initially measured adenoma to be 6.7mm x 5.3mm   - no change noted on last MRI, however no pituitary sequence was done  - imaging important as cabergoline may be started if adenoma grows  Plan:  1. Will repeat PRL  2. Consider repeating MRI with pituitary sequence  3. Consider cabergoline pending imaging  4. Consult neurosurgery if adenoma grows or pt has symptoms after cabergoline tx (galactorrhea, vision loss, infertility)    3. Oligomenorrhea  - ddx: chronic illness (ESRD) vs hypogonadism vs hypothyroidism +/- contraceptive pill effects.  Patient is aware that we can not properly access gonadothropin levels while she is on birth control pills.  Will check PRL.    Test and/or medications prescribed by physician today:  Orders Placed This Encounter   Procedures     Prolactin     TSH     T4 free     I will contact the patient with the test results and see her back in clinic in 6 months or sooner if needed.    Nidhi Coles MD PhD    Division of Endocrinology and Diabetes

## 2017-10-03 NOTE — NURSING NOTE
Chief Complaint   Patient presents with     RECHECK     F/U HYPOTHYOID, GALACTORRHEA     Nathalia Aly, [facility]  Endocrinology & Diabetes 3G

## 2017-10-03 NOTE — MR AVS SNAPSHOT
After Visit Summary   10/3/2017    Mona Wagner    MRN: [id number]           Patient Information     Date Of Birth          1990        Visit Information        Provider Department      10/3/2017 12:00 PM Melissa Coles MD [facility]        Today's Diagnoses     Galactorrhea    -  1    Acquired hypothyroidism           Follow-ups after your visit        Follow-up notes from your care team     Return in about 6 months (around 4/3/2018).      Your next 10 appointments already scheduled     Apr 03, 2018 10:00 AM CDT   (Arrive by 9:45 AM)   RETURN ENDOCRINE with Melissa Coles MD   [facility] ([facility])    [address] [address] [phone]              Who to contact     Please call your clinic at [phone] to:    Ask questions about your health    Make or cancel appointments    Discuss your medicines    Learn about your test results    Speak to your doctor   If you have compliments or concerns about an experience at your clinic, or if you wish to file a complaint, please contact [facility] Patient Relations at [phone] or email us at [email or web address]         Additional Information About Your Visit        MyChart Information     [employer] gives you secure access to your electronic health record. If you see a primary care provider, you can also send messages to your care team and make appointments. If you have questions, please call your primary care clinic.  If you do not have a primary care provider, please call [phone] and they will assist you.      [employer] is an electronic gateway that provides easy, online access to your medical records. With [employer], you can request a clinic appointment, read your test results, renew a prescription or communicate with your care team.     To access your existing account, please contact your  [facility] or call [phone] for assistance.        Care EveryWhere ID     This is your Care EveryWhere ID. This could be used by other organizations to access your [facility] medical records  [id number]        Your Vitals Were     Pulse Height BMI (Body Mass Index)             76 1.524 m (5') 21.09 kg/m2          Blood Pressure from Last 3 Encounters:   10/11/17 (!) 165/106   10/03/17 (!) 156/98   07/06/17 (!) 157/104    Weight from Last 3 Encounters:   10/11/17 50.1 kg (110 lb 6.4 oz)   10/03/17 49 kg (108 lb)   07/06/17 47.6 kg (105 lb)                 Today's Medication Changes          These changes are accurate as of: 10/3/17 11:59 PM.  If you have any questions, ask your nurse or doctor.               These medicines have changed or have updated prescriptions.        Dose/Directions    lisinopril 20 MG tablet   Commonly known as:  PRINIVIL/ZESTRIL   This may have changed:    - how much to take  - when to take this   Used for:  Secondary hypertension due to renal disease        Dose:  20 mg   Take 1 tablet (20 mg) by mouth daily   Quantity:  90 tablet   Refills:  3                Primary Care Provider Office Phone # Fax #    Macarena Jackie Bowen [phone]       [facility] [address] [place] [address]        Equal Access to Services     RUCHI VILLAGRAN AH: Hadii stone ochoa hadasho Sochantelleali, waaxda luqadaha, qaybta kaalmada adehortenciayada, quynh tierney . So [facility] [phone].    ATENCIÓN: Si habla español, tiene a gerber disposición servicios gratuitos de asistencia lingüística. Llame al [phone].    We comply with applicable federal civil rights laws and [place] laws. We do not discriminate on the basis of race, color, national origin, age, disability, sex, sexual orientation, or gender identity.            Thank you!     Thank you for choosing [facility]  for your care. Our goal is always to provide you with  excellent care. Hearing back from our patients is one way we can continue to improve our services. Please take a few minutes to complete the written survey that you may receive in the mail after your visit with us. Thank you!             Your Updated Medication List - Protect others around you: Learn how to safely use, store and throw away your medicines at www.disposemymeds.org.          This list is accurate as of: 10/3/17 11:59 PM.  Always use your most recent med list.                   Brand Name Dispense Instructions for use Diagnosis    acetaminophen 325 MG tablet    TYLENOL    100 tablet    Take 2 tablets (650 mg) by mouth every 4 hours as needed for mild pain    Back pain, unspecified back location, unspecified back pain laterality, unspecified chronicity       amLODIPine 5 MG tablet    NORVASC    180 tablet    Take 2 tablets (10 mg) by mouth daily Take one tablet twice a day.    Renal hypertension, stage 5 chronic kidney disease or end stage renal disease       docusate sodium 100 MG tablet    COLACE    60 tablet    Take 100 mg by mouth daily    Constipation, unspecified constipation type       EMLA cream   Generic drug:  lidocaine-prilocaine     30 g    APPLY TO ACCESS SITE 30 TO 60 MINUTES PRIOR TO        DIALYSIS    ESRD (end stage renal disease) on dialysis (H)       levETIRAcetam 250 MG tablet    KEPPRA    60 tablet    Take  1 1/2 tabs daily.  Take an additional 250 mg after dialysis on M, W, F.    Partial symptomatic epilepsy with complex partial seizures, not intractable, without status epilepticus (H)       levothyroxine 25 MCG tablet    SYNTHROID/LEVOTHROID    135 tablet    Take 1 tablet (25 mcg) Tuesday, Thursday, Saturday and Sunday and 1.5 tablets (37.5 mcg) on Monday, Wednesday and Friday every week.    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       NEPHROCAPS 1  MG capsule     30 capsule    Take 1 capsule (1 mg) by mouth daily    ESRD needing dialysis (H)       norethindrone 0.35 MG per tablet    MICRONOR    28 tablet    Take 1 tablet (0.35 mg) by mouth daily    Surveillance of previously prescribed contraceptive pill       omeprazole 20 MG CR capsule    priLOSEC    30 capsule    Take 1 capsule (20 mg) by mouth daily    Dyspepsia       ondansetron 4 MG ODT tab    ZOFRAN ODT    60 tablet    Take 1-2 tablets (4-8 mg) by mouth every 8 hours as needed for nausea    Nausea       SENSIPAR 30 MG tablet   Generic drug:  cinacalcet      Take 30 mg by mouth daily        sevelamer 800 MG tablet    RENVELA     Take 800 mg by mouth 3 times daily (with meals)        simethicone 125 MG Chew chewable tablet    MYLICON     Take 125 mg by mouth 2 times daily

## 2017-10-04 NOTE — TELEPHONE ENCOUNTER
Have not received fax. Called [phone] again and requested that form be faxed again.     Bryon Anand RN

## 2017-10-11 ENCOUNTER — OFFICE VISIT (OUTPATIENT)
Dept: FAMILY MEDICINE | Facility: CLINIC | Age: 27
End: 2017-10-11

## 2017-10-11 VITALS
HEART RATE: 77 BPM | TEMPERATURE: 98.2 F | DIASTOLIC BLOOD PRESSURE: 106 MMHG | BODY MASS INDEX: 21.68 KG/M2 | SYSTOLIC BLOOD PRESSURE: 165 MMHG | OXYGEN SATURATION: 100 % | HEIGHT: 60 IN | WEIGHT: 110.4 LBS

## 2017-10-11 DIAGNOSIS — G44.52 NEW DAILY PERSISTENT HEADACHE: Primary | ICD-10-CM

## 2017-10-11 DIAGNOSIS — K21.9 GASTROESOPHAGEAL REFLUX DISEASE WITHOUT ESOPHAGITIS: ICD-10-CM

## 2017-10-11 NOTE — PATIENT INSTRUCTIONS
- Stop taking the omeprazole and continue taking Ranitidine 1 tablet with meal every day for the stomach pain.  - You can continue taking your tylenol as long you do not exceed 4000mg.  - Continue taking your norethindrone (Micronor) birth control.      Your medication list is printed, please keep this with you, it is helpful to bring this current list to any other medical appointments, the emergency room or hospital.    If you had lab testing today and your results are reassuring or normal they will be be mailed to you within 7 days.     If the lab tests need quick action we will call you with the results.   The phone number we will call with results is # [phone] (home) . If this is not the best number please call our clinic and change the number.    If you need any refills please call your pharmacy and they will contact us.    If you have any further concerns or wish to schedule another appointment you must call our office during normal business hours  [phone] (8-5:00 M-F)  If you have urgent medical questions that cannot wait  you may also call [phone] at any time of day.  If you have a medical emergency please call [phone].    Thank you for coming to [facility].      Referral for ( TEST )  :      MR Brain w/o Contrast  LOCATION/PLACE/Provider :    [facility]  [address] [place] [address]   DATE & TIME :     10- at 1:45  PHONE :     [phone]  FAX :       ADDITIONAL INFORMATION :     NA  Appointment made by clinic staff/:    ALLEY

## 2017-10-11 NOTE — MR AVS SNAPSHOT
After Visit Summary   10/11/2017    Mona Wagner    MRN: [id number]           Patient Information     Date Of Birth          1990        Visit Information        Provider Department      10/11/2017 9:00 AM Elvi Miranda MD [facility]        Today's Diagnoses     New daily persistent headache    -  1      Care Instructions    - Stop taking the omeprazole and continue taking Ranitidine 1 tablet with meal every day for the stomach pain.  - You can continue taking your tylenol as long you do not exceed 4000mg.  - Continue taking your norethindrone (Micronor) birth control.      Your medication list is printed, please keep this with you, it is helpful to bring this current list to any other medical appointments, the emergency room or hospital.    If you had lab testing today and your results are reassuring or normal they will be be mailed to you within 7 days.     If the lab tests need quick action we will call you with the results.   The phone number we will call with results is # [phone] (home) . If this is not the best number please call our clinic and change the number.    If you need any refills please call your pharmacy and they will contact us.    If you have any further concerns or wish to schedule another appointment you must call our office during normal business hours  [phone] (8-5:00 M-F)  If you have urgent medical questions that cannot wait  you may also call [phone] at any time of day.  If you have a medical emergency please call [phone].    Thank you for coming to [facility].            Follow-ups after your visit        Your next 10 appointments already scheduled     Apr 03, 2018 10:00 AM CDT   (Arrive by 9:45 AM)   RETURN ENDOCRINE with Melissa Coles MD   [facility] Endocr[facility]ology ([facility] [[email or web address] and Surgery [place])    [address] [address] [phone]              Future tests that were  "ordered for you today     Open Future Orders        Priority Expected Expires Ordered    MR Brain w/o Contrast Routine  10/11/2018 10/11/2017            Who to contact     Please call your clinic at [phone] to:    Ask questions about your health    Make or cancel appointments    Discuss your medicines    Learn about your test results    Speak to your doctor   If you have compliments or concerns about an experience at your clinic, or if you wish to file a complaint, please contact [facility] Patient Relations at [phone] or email us at [email or web address]         Additional Information About Your Visit        [employer] Information     [employer] gives you secure access to your electronic health record. If you see a primary care provider, you can also send messages to your care team and make appointments. If you have questions, please call your primary care clinic.  If you do not have a primary care provider, please call [phone] and they will assist you.      [employer] is an electronic gateway that provides easy, online access to your medical records. With [employer], you can request a clinic appointment, read your test results, renew a prescription or communicate with your care team.     To access your existing account, please contact your [facility] or call [phone] for assistance.        Care EveryWhere ID     This is your Care EveryWhere ID. This could be used by other organizations to access your [place] medical records  [id number]        Your Vitals Were     Pulse Temperature Height Pulse Oximetry BMI (Body Mass Index)       77 98.2  F (36.8  C) (Oral) 5' 0.25\" (153 cm) 100% 21.38 kg/m2        Blood Pressure from Last 3 Encounters:   10/11/17 (!) 165/106   10/03/17 (!) 156/98   07/06/17 (!) 157/104    Weight from Last 3 Encounters:   10/11/17 110 lb 6.4 oz (50.1 kg)   10/03/17 108 lb (49 kg)   07/06/17 105 lb (47.6 kg)               "   Today's Medication Changes          These changes are accurate as of: 10/11/17  9:38 AM.  If you have any questions, ask your nurse or doctor.               These medicines have changed or have updated prescriptions.        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 So [facility] [phone].    ATENCIÓN: Si habla español, tiene a gerber disposición servicios gratuitos de asistencia lingüística. Llame al [phone].    We comply with applicable federal civil rights laws and [place] laws. We do not discriminate on the basis of race, color, national origin, age, disability, sex, sexual orientation, or gender identity.            Thank you!     Thank you for choosing [facility]  for your care. Our goal is always to provide you with excellent care. Hearing back from our patients is one way we can continue to improve our services. Please take a few minutes to complete the written survey that you may receive in the mail after your visit with us. Thank you!             Your Updated Medication List - Protect others around you: Learn how to safely use, store and throw away your medicines at www.disposemymeds.org.          This list is accurate as of: 10/11/17  9:38 AM.  Always use your most recent med list.                   Brand Name Dispense Instructions for use Diagnosis    acetaminophen 325 MG tablet    TYLENOL    100  tablet    Take 2 tablets (650 mg) by mouth every 4 hours as needed for mild pain    Back pain, unspecified back location, unspecified back pain laterality, unspecified chronicity       amLODIPine 5 MG tablet    NORVASC    180 tablet    Take 2 tablets (10 mg) by mouth daily Take one tablet twice a day.    Renal hypertension, stage 5 chronic kidney disease or end stage renal disease       docusate sodium 100 MG tablet    COLACE    60 tablet    Take 100 mg by mouth daily    Constipation, unspecified constipation type       EMLA cream   Generic drug:  lidocaine-prilocaine     30 g    APPLY TO ACCESS SITE 30 TO 60 MINUTES PRIOR TO        DIALYSIS    ESRD (end stage renal disease) on dialysis (H)       levETIRAcetam 250 MG tablet    KEPPRA    60 tablet    Take  1 1/2 tabs daily.  Take an additional 250 mg after dialysis on M, W, F.    Partial symptomatic epilepsy with complex partial seizures, not intractable, without status epilepticus (H)       levothyroxine 25 MCG tablet    SYNTHROID/LEVOTHROID    135 tablet    Take 1 tablet (25 mcg) Tuesday, Thursday, Saturday and Sunday and 1.5 tablets (37.5 mcg) on Monday, Wednesday and Friday every week.    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       NEPHROCAPS 1 MG capsule     30 capsule    Take 1 capsule (1 mg) by mouth daily    ESRD needing dialysis (H)       norethindrone 0.35 MG per tablet    MICRONOR    28 tablet    Take 1 tablet (0.35 mg) by mouth daily    Surveillance of previously prescribed contraceptive pill       omeprazole 20 MG CR capsule    priLOSEC    30 capsule    Take 1 capsule (20 mg) by mouth daily    Dyspepsia       ondansetron 4 MG ODT tab    ZOFRAN ODT    60 tablet    Take 1-2 tablets (4-8 mg) by mouth every 8 hours as needed for nausea    Nausea       SENSIPAR 30 MG tablet   Generic drug:  cinacalcet      Take 30 mg by mouth  daily        sevelamer 800 MG tablet    RENVELA     Take 800 mg by mouth 3 times daily (with meals)        simethicone 125 MG Chew chewable tablet    MYLICON     Take 125 mg by mouth 2 times daily

## 2017-10-11 NOTE — PROGRESS NOTES
HPI:   Mona Wagner is a 26 year old  female with PMH of dialysis-dependent kidney disease, prolactinoma, and previous subarachnoid hemorrhage who presents with daily headaches.     Headaches  Patient has been bringing up new daily headaches to her nephrologist- at first she thought maybe they were related to fluctuating blood pressures. They have adjusted meds in attempt to resolve headaches and Mona has found that she has headaches whether her BP is high or low. Her nephrologist asked her to come see her PCP for the headaches.   2-3 months of daily headaches. Prior to that she would get HAs once or twice a week.   Takes tylenol 650 once or twice a day and this helps.   Headaches do not wake her from sleep. They usually start 30 min after waking. Go away with tylenol. If no tylenol, it's there all day  Feels like a pounding sensation. Feels sharp sometimes if changing position.   Much worse after dialysis. Does dialysis MWF.   Occurs in the occiput and frontal areas.   Has dizziness when walking or working. No room-spinning. No visual aura. No vomiting or nausea.   Has increased fluid intake and this hasn't helped.   No vision change.     History of prolactinoma. Last MRI May 2017 which did not show any visible tumor. She does not feel that her peripheral vision has been at all compromised in the recent past.     Other relevant history includes past subarachnoid hemorrhage. This occurred during a time that she was on warfarin for a venous thrombosis. She states the thrombosis was related to her dialysis access. She is no longer on warfarin.     Question about PPI  Pt is wondering if she should continue omeprazole. She has history of H pylori in June of 2015 which was treated and after which she had negative H pylori testing (October 2016). She does sometimes have GERD symptoms.     Question about birth control  Pt is concerned because she does not get regular periods on her birth control. She takes  norethindrone daily without any breaks. She is not concerned that she could be pregnant.            PMHX:     Patient Active Problem List   Diagnosis     ESRD (end stage renal disease) on dialysis (H)     DVT of upper extremity (deep vein thrombosis) (H)     Subarachnoid hemorrhage (H)     Seizure disorder (H)     HTN (hypertension)     Other general symptoms(780.99)     Galactorrhea     Acquired hypothyroidism     Anemia in chronic kidney disease     Increased prolactin level (H)     Hyperphosphatemia     Hypertension     Hypomagnesemia     Normocytic anemia     Pituitary neoplasm     Thrombocytopenia (H)     Family History   Problem Relation Age of Onset     Hypertension Sister      DIABETES Sister      CEREBROVASCULAR DISEASE Sister      KIDNEY DISEASE Mother      KIDNEY DISEASE Sister      CEREBROVASCULAR DISEASE Father      Coronary Artery Disease No family hx of      Breast Cancer No family hx of      Cancer - colorectal No family hx of      Ovarian Cancer No family hx of      Prostate Cancer No family hx of      Other Cancer No family hx of      Asthma No family hx of        Social History     Social History     Marital status:      Spouse name: N/A     Number of children: N/A     Years of education: N/A     Occupational History     Not on file.     Social History Main Topics     Smoking status: Never Smoker     Smokeless tobacco: Never Used     Alcohol use No     Drug use: No     Sexual activity: Yes     Partners: Male     Other Topics Concern     Not on file     Social History Narrative    Date of Service:5/15/2013     Name: Mona VALDOVINOS (Month, Day, Year of birth): 1990     MRN: [id number]     New Patient: Yes    Preferred Language: English     Needed: No    County of Residence: [place]    Marital Status:     Household size: 8    Number of Dependents:0     Pregnant: 0    Average Monthly Income: $ 600    Citizenship Status: Citizen    Gave Pt [employer] and [employer]  applications       Current Outpatient Prescriptions   Medication Sig Dispense Refill     cinacalcet (SENSIPAR) 30 MG tablet Take 30 mg by mouth daily       simethicone (MYLICON) 125 MG CHEW chewable tablet Take 125 mg by mouth 2 times daily       levETIRAcetam (KEPPRA) 250 MG tablet Take  1 1/2 tabs daily.  Take an additional 250 mg after dialysis on M, W, F. 60 tablet 5     EMLA cream APPLY TO ACCESS SITE 30 TO 60 MINUTES PRIOR TO        DIALYSIS 30 g 11     norethindrone (MICRONOR) 0.35 MG per tablet Take 1 tablet (0.35 mg) by mouth daily 28 tablet 11     omeprazole (PRILOSEC) 20 MG CR capsule Take 1 capsule (20 mg) by mouth daily 30 capsule 3     ondansetron (ZOFRAN ODT) 4 MG ODT tab Take 1-2 tablets (4-8 mg) by mouth every 8 hours as needed for nausea 60 tablet 1     docusate sodium (COLACE) 100 MG tablet Take 100 mg by mouth daily 60 tablet 1     levothyroxine (SYNTHROID/LEVOTHROID) 25 MCG tablet Take 1 tablet (25 mcg) Tuesday, Thursday, Saturday and  and 1.5 tablets (37.5 mcg) on Monday, Wednesday and Friday every week. 135 tablet 3     acetaminophen (TYLENOL) 325 MG tablet Take 2 tablets (650 mg) by mouth every 4 hours as needed for mild pain 100 tablet 1     amLODIPine (NORVASC) 5 MG tablet Take 2 tablets (10 mg) by mouth daily Take one tablet twice a day. 180 tablet 3     lisinopril (PRINIVIL/ZESTRIL) 20 MG tablet Take 1 tablet (20 mg) by mouth daily (Patient taking differently: Take 10 mg by mouth 2 times daily ) 90 tablet 3     sevelamer (RENVELA) 800 MG tablet Take 800 mg by mouth 3 times daily (with meals)        B complex-C-folic acid (NEPHROCAPS) 1 MG capsule Take 1 capsule (1 mg) by mouth daily 30 capsule 3       Social History   Substance Use Topics     Smoking status: Never Smoker     Smokeless tobacco: Never Used     Alcohol use No       Social History     Social History Narrative    Date of Service:5/15/2013     Name: Mona Wagner     BONIFACIO (Month, Day, Year of birth): 1990     MRN:  "[id number]     New Patient: Yes    Preferred Language: English     Needed: No    County of Residence: [place]    Marital Status:     Household size: 8    Number of Dependents:0     Pregnant: 0    Average Monthly Income: $ 600    Citizenship Status: Citizen    Gave Pt [employer] and [employer] applications       Allergies   Allergen Reactions     Chlorhexidine Rash     Rash at site     Sulfa Drugs Rash     Muscle stiffness of neck     Alcohol Swabs [Isopropyl Alcohol] Rash       No results found for this or any previous visit (from the past 24 hour(s)).         Review of Systems:   See HPI.          Physical Exam:     Vitals:    10/11/17 0858 10/11/17 0904   BP: (!) 162/108 (!) 165/106   Pulse: 77    Temp: 98.2  F (36.8  C)    TempSrc: Oral    SpO2: 100%    Weight: 110 lb 6.4 oz (50.1 kg)    Height: 5' 0.25\" (153 cm)      Body mass index is 21.38 kg/(m^2).    General: Alert, well-appearing female in NAD  HEENT: PERRL, normal EOM without nystagmus. Peripheral fields tested and are normal. Moist oral mucus membranes, no oral lesions. External auditory canals and TMs normal bilaterally.   Pulm: CTA BL, no tachypnea  CV: RRR, no murmur but can hear AV fistula  Abd: soft, NTND, no masses  Ext: Warm, well perfused, 2+ BL radial pulses, no LE edema  Skin: No rash on limited skin exam  Psych: Mood appropriate to visit content, full affect, rational thought content and process      Assessment and Plan     1. New daily persistent headache  Differential at this time is broad regarding causes of her headaches but red flag signs include worsening headaches with increased frequency for two months. Given that she has a known prolactinoma, I discussed with her that I recommended that we rule out mass lesion with MRI. She is in agreement with this plan. Other possible etiologies include medication withdrawal- she has been taking BID tylenol for two months and it sounds like the times when tylenol wears off, headache comes " back. My next course of action if MRI is normal would be to wean off tylenol to see if this is rebound HA phenomenon. Doesn't sound like migraine to me by history. She does have history of subarachnoid hemorrhage but this was while she was on coumadin and she no longer takes coumadin. She has no sudden pain and this pain feels different to her than the pain she had with the bleed.   - MR Brain w/o Contrast; Future-- scheduled to be done within the next 1 week.   - Pt hypertensive today- antihypertensives are managed by nephrology, no indication to acutely lower pressure at this visit .    2. Gastroesophageal reflux disease without esophagitis  Since H pylori was treated 2 years ago successfully, no need to still be on PPI. No history of bleeding ulcer that she knows of. We will try zantac daily for GERD symptoms and do a trial off PPI.   - ranitidine (ZANTAC) 150 MG tablet; Take 1 tablet (150 mg) by mouth daily (with breakfast)  Dispense: 60 tablet; Refill: 1    3. Amenorrhea  I discussed with pt that if she is taking no breaks from her progesterone-only contraceptive it is normal not to have periods due to no withdrawal phase. She may want to discuss scheduling 3-4 menses a year with her PCP. She does not desire pregnancy test today.       Options for treatment and follow-up care were reviewed with the patient and/or guardian. Mona Wagner and/or guardian engaged in the decision making process and verbalized understanding of the options discussed and agreed with the final plan.    Elvi Miranda MD  [facility]   Pager: [phone]

## 2017-10-27 DIAGNOSIS — I15.1 HYPERTENSION SECONDARY TO OTHER RENAL DISORDERS: Primary | ICD-10-CM

## 2017-10-27 RX ORDER — LISINOPRIL 20 MG/1
20 TABLET ORAL DAILY
Qty: 90 TABLET | Refills: 3 | Status: SHIPPED | OUTPATIENT
Start: 2017-10-27 | End: 2018-01-18

## 2017-12-06 ENCOUNTER — RESULTS ONLY (OUTPATIENT)
Dept: OTHER | Facility: CLINIC | Age: 27
End: 2017-12-06

## 2017-12-06 DIAGNOSIS — N18.6 END STAGE RENAL DISEASE (H): ICD-10-CM

## 2017-12-06 DIAGNOSIS — Z76.82 ORGAN TRANSPLANT CANDIDATE: ICD-10-CM

## 2017-12-06 PROCEDURE — 86832 HLA CLASS I HIGH DEFIN QUAL: CPT | Performed by: INTERNAL MEDICINE

## 2017-12-06 PROCEDURE — 86833 HLA CLASS II HIGH DEFIN QUAL: CPT | Performed by: INTERNAL MEDICINE

## 2017-12-12 LAB — PRA SINGLE ANTIGEN IGG ANTIBODY: NORMAL

## 2018-01-16 DIAGNOSIS — M54.9 BACK PAIN, UNSPECIFIED BACK LOCATION, UNSPECIFIED BACK PAIN LATERALITY, UNSPECIFIED CHRONICITY: ICD-10-CM

## 2018-01-16 RX ORDER — ACETAMINOPHEN 325 MG/1
650 TABLET ORAL EVERY 4 HOURS PRN
Qty: 100 TABLET | Status: CANCELLED | OUTPATIENT
Start: 2018-01-16

## 2018-01-17 ENCOUNTER — OFFICE VISIT (OUTPATIENT)
Dept: FAMILY MEDICINE | Facility: CLINIC | Age: 28
End: 2018-01-17
Payer: MEDICARE

## 2018-01-17 ENCOUNTER — MYC MEDICAL ADVICE (OUTPATIENT)
Dept: FAMILY MEDICINE | Facility: CLINIC | Age: 28
End: 2018-01-17

## 2018-01-17 VITALS
HEART RATE: 83 BPM | TEMPERATURE: 98.2 F | BODY MASS INDEX: 21.6 KG/M2 | SYSTOLIC BLOOD PRESSURE: 156 MMHG | OXYGEN SATURATION: 99 % | WEIGHT: 110 LBS | HEIGHT: 60 IN | DIASTOLIC BLOOD PRESSURE: 96 MMHG

## 2018-01-17 DIAGNOSIS — M54.9 BACK PAIN, UNSPECIFIED BACK LOCATION, UNSPECIFIED BACK PAIN LATERALITY, UNSPECIFIED CHRONICITY: ICD-10-CM

## 2018-01-17 DIAGNOSIS — T78.2XXD ANAPHYLAXIS, SUBSEQUENT ENCOUNTER: Primary | ICD-10-CM

## 2018-01-17 DIAGNOSIS — K59.00 CONSTIPATION, UNSPECIFIED CONSTIPATION TYPE: ICD-10-CM

## 2018-01-17 RX ORDER — ACETAMINOPHEN 325 MG/1
650 TABLET ORAL EVERY 4 HOURS PRN
Qty: 100 TABLET | Refills: 1 | Status: SHIPPED | OUTPATIENT
Start: 2018-01-17 | End: 2019-07-31

## 2018-01-17 RX ORDER — ASPIRIN 81 MG
100 TABLET, DELAYED RELEASE (ENTERIC COATED) ORAL DAILY
Qty: 60 TABLET | Refills: 1 | Status: SHIPPED | OUTPATIENT
Start: 2018-01-17 | End: 2019-01-29

## 2018-01-17 RX ORDER — EPINEPHRINE 0.3 MG/.3ML
0.3 INJECTION SUBCUTANEOUS PRN
Qty: 0.6 ML | Refills: 3 | Status: SHIPPED | OUTPATIENT
Start: 2018-01-17 | End: 2019-10-07

## 2018-01-17 RX ORDER — DIPHENHYDRAMINE HCL 25 MG
25-50 TABLET ORAL EVERY 6 HOURS PRN
Qty: 60 TABLET | Refills: 1 | Status: SHIPPED | OUTPATIENT
Start: 2018-01-17 | End: 2020-04-16

## 2018-01-17 NOTE — Clinical Note
Jamie Wilkes, I have placed an allergy referral for this pt. Could you include this office visit note and send to the allergist? Thanks!

## 2018-01-17 NOTE — PROGRESS NOTES
"[facility]        Subjective     HPI:  Mona Wagner is a 27 year old female new to me with IgA nephropathy with ESRD on dialysis (transplant candidate) who presents for the following concerns:    \"Allergic to chicken?\": For the past two months, noticing within about 30min to 1 hr of eating any chicken has itchy ears, face fullness and swelling (usually lips) and itchy throat.  Sx typically resolved within a couple hours spontaneously.     She states the fullness in her cheeks and swelling in her lips is noticeable to her \"sensation-wise\", but she does not visibly see any changes she thinks when she looks in the mirror.   Also experiences nausea and diarrhea after eating chicken (sometimes has pork during these meals as well).  Denies any skin rash.    Yesterday ate chicken and pork around 6:30-7pm, and had been watching TV around 8pm when she had itchy ears, followed by cheek swelling and trouble breathing. Wondered if it was a panic attack. Had elevated BP during this, otherwise normal BPs in 130s/80s.  Due to the elevated blood pressure during this time, she took an additional 10 mg of her lisinopril and blood pressure improved.  She also took a benadryl and 20min later facial fullness, itchy ears,  and breathing issues resolved.  Of note, she is on lisinopril at tightly regimented dose which alternates between 20 mg and 30 mg, as recommended by her nephrologist for blood pressure.    She denies any known tick bite .  No history of eczema.    She reports only an allergy to sulfa drugs causing \"muscle stiffness of the neck \" and  chlorhexidine and alcohol swabs causing rash at the site of exposure.    FAM Hx:     Her brother has \"edema of the esophagus\" on endoscopy and during work up for this was found to have an allergy to eggs and chicken on test results but she's unsure if he ever was symptomatic from that. (she doesn't live with him to know more about it)    Her father has a history of " "\"allergy to lisinopril\" which she states that  \"his cheeks were swollen after one dose\" and told to not take it anymore.     Strong famhx of kidney disease: her sister is also on dialysis. Her mother and another sister have kidney disease.    ROS: constitutional, cardiovascular, respiratory, GI, , MSK systems reviewed and negative except as noted above.    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    Wt Readings from Last 3 Encounters:   01/17/18 110 lb (49.9 kg)   10/11/17 110 lb 6.4 oz (50.1 kg)   10/03/17 108 lb (49 kg)       Gen: healthy, alert and no distress  HEENT: No asymmetry, TMs clear bilaterally, nares patent and without nasal polyps or discharge. MMM, no erythema of oropharynx. No changes of soft or hard palate, no cobblestoning of posterior oropharynx. Tongue & lips appear normal in size. No adenopathy. Thyroid normal to palpation.  CV: RRR, no murmurs. 2+ peripheral pulses  Lungs: CTAB, no wheezing or crackles, normal effort  Abd: soft, ND/NT  Psych: mood neutral, affect appropriate    Labs/Imaging this visit:  No results found for this or any previous visit (from the past 100 hour(s)).         Assessment & Plan     27 year old female with:    Anaphylaxis.  Patient reporting 2 months of symptoms which seem associated with intake of chicken, unfortunately which is complicated by generally coexisting meats.  Discussed that a meat allergy in itself would be pretty rare, however she does describe IgE related symptoms occurring consistently and shortly after eating chicken.  Differential would include food oral allergy, as she describes mostly facial related symptoms, with recent tightness in breathing which may be secondary to anxiety.  However, I am also concerned given her family history of possible hereditary vs " acquired angioedema (lisinopril related?).  We talked about this concern, however given her sx as reported today, she relates a history of symptoms occurring after exposure to meats and not randomly. I do think it be reasonable to continue her on lisinopril due to her complicated blood pressure management per nephrology, in setting of end-stage renal disease.      Due to the multi-organ system involvement (facial and lip perceived swelling, tight breathing, itching of ears and throat, nausea with diarrhea), I counseled patient on anaphylaxis and management with EpiPen.  I have added chicken as an allergy to her list, given current history to support this, though this may change as we gather more information.  EpiPen use was demonstrated in clinic today.  I do think it be reasonable to provide Benadryl as well, considering this has proved beneficial thus far.  We discussed that if her symptoms continue despite avoidance of chicken and other meats like pork, I would want to know about this right away and we would discuss stopping her lisinopril.    We will also refer to Allergy given the complex nature of this, as she may benefit from further testing, including C4 complement level (pt did not have time today to stay for labs).    I will also contact to her nephrologist to inquire about the following- some research has shown people dialyzed on high-flux HD dialyzers have increased incidence of anaphylactoid reactions when using an ACEI. Will see what his thoughts are, and maybe to consider switch to losartan.  Per UpToDate:   A high incidence of anaphylactoid reactions is also seen when angiotensin-converting enzyme (ACE) inhibitors are used in patients treated with high-flux hemodialysis using acrylonitrile-sodium methallyl sulfonate (AN-69) dialyzers [6-8]. One report, for example, surveyed dialysis centers reporting anaphylactic reactions [6]. An anaphylactic reaction developed in 41 of 72 patients (57 percent)  treated with an ACE inhibitor and dialyzed with an AN-69 membrane. In contrast, an anaphylactic reaction developed in none of the 71 patients taking an ACE inhibitor but dialyzed with other membranes and in only 2 of 519 patients (0.4 percent) dialyzed with AN-69 membrane but not treated with an ACE inhibitor.    Follow up: After allergy visit and Return to care as needed if symptoms worsen or fail to improve.    Manjula Gamez MD    Precepted today with: Joseluis Freire MD        -------  PMH:  Patient Active Problem List   Diagnosis     ESRD (end stage renal disease) on dialysis (H)     DVT of upper extremity (deep vein thrombosis) (H)     Subarachnoid hemorrhage (H)     Seizure disorder (H)     HTN (hypertension)     Other general symptoms(780.99)     Galactorrhea     Acquired hypothyroidism     Anemia in chronic kidney disease     Increased prolactin level (H)     Hyperphosphatemia     Hypertension     Hypomagnesemia     Normocytic anemia     Pituitary neoplasm     Thrombocytopenia (H)      Current Outpatient Prescriptions   Medication     acetaminophen (TYLENOL) 325 MG tablet     cinacalcet (SENSIPAR) 30 MG tablet     simethicone (MYLICON) 125 MG CHEW chewable tablet     levETIRAcetam (KEPPRA) 250 MG tablet     EMLA cream     norethindrone (MICRONOR) 0.35 MG per tablet     ondansetron (ZOFRAN ODT) 4 MG ODT tab     docusate sodium (COLACE) 100 MG tablet     levothyroxine (SYNTHROID/LEVOTHROID) 25 MCG tablet     amLODIPine (NORVASC) 5 MG tablet     B complex-C-folic acid (NEPHROCAPS) 1 MG capsule     lisinopril (PRINIVIL/ZESTRIL) 20 MG tablet     ranitidine (ZANTAC) 150 MG tablet     lisinopril (PRINIVIL/ZESTRIL) 20 MG tablet     sevelamer (RENVELA) 800 MG tablet     No current facility-administered medications for this visit.       ALLERGIES: Chlorhexidine; Sulfa drugs; and Alcohol swabs [isopropyl alcohol]    Options for treatment and follow-up care were reviewed with the patient. Mona Wagner engaged in the  decision making process and verbalized understanding of the options discussed and agreed with the final plan.

## 2018-01-17 NOTE — TELEPHONE ENCOUNTER
Called, spoke to Mona who reports symptoms have resolved but would like to discuss further if reaction is related to food allergy. Recommend an appt for further discussion and evaluation. Mariana WADSWORTH

## 2018-01-17 NOTE — MR AVS SNAPSHOT
After Visit Summary   1/17/2018    Mona Wagner    MRN: [id number]           Patient Information     Date Of Birth          1990        Visit Information        Provider Department      1/17/2018 10:20 AM Manjula Gamez MD [facility]        Today's Diagnoses     Anaphylaxis, subsequent encounter    -  1    Constipation, unspecified constipation type        Back pain, unspecified back location, unspecified back pain laterality, unspecified chronicity           Follow-ups after your visit        Additional Services     ALLERGY/ASTHMA ADULT REFERRAL       Patient to prefers to be called    Reason for Referral: 2 months of anaphylaxis sx (throat tightening, breathing trouble, diarrhea, lip swelling, facial fullness) 30min-1hr after eating chicken (and/or pork), also concern for ACEI related angioedema. She is on lisinopril and reports her father has h/o cheek swelling after one dose of this and he was told to stop.      needed: No  Language: English    May leave message on voicemail: Yes    ([facility] Only) Referral should be tracked (Yes/No)?                  Your next 10 appointments already scheduled     Feb 22, 2018  8:30 AM CST   Return Visit with Theresa Sapp MD   [facility]    [address], [address]  [place] [address] [phone]            Apr 03, 2018 10:00 AM CDT   (Arrive by 9:45 AM)   RETURN ENDOCRINE with Melissa Coles MD   [facility] ([facility])    [address]  3rd Floor  [place] [address] [phone]              Who to contact     Please call your clinic at [phone] to:    Ask questions about your health    Make or cancel appointments    Discuss your medicines    Learn about your test results    Speak to your doctor            Additional Information About Your Visit        MyChart Information     MyChart gives you  "secure access to your electronic health record. If you see a primary care provider, you can also send messages to your care team and make appointments. If you have questions, please call your primary care clinic.  If you do not have a primary care provider, please call [phone] and they will assist you.      KIWATCH is an electronic gateway that provides easy, online access to your medical records. With KIWATCH, you can request a clinic appointment, read your test results, renew a prescription or communicate with your care team.     To access your existing account, please contact your [facility] or call [phone] for assistance.        Care EveryWhere ID     This is your Care EveryWhere ID. This could be used by other organizations to access your [facility] medical records  [id number]        Your Vitals Were     Pulse Temperature Height Pulse Oximetry BMI (Body Mass Index)       83 98.2  F (36.8  C) (Oral) 5' 0.43\" (153.5 cm) 99% 21.18 kg/m2        Blood Pressure from Last 3 Encounters:   01/17/18 (!) 156/96   10/11/17 (!) 165/106   10/03/17 (!) 156/98    Weight from Last 3 Encounters:   01/17/18 110 lb (49.9 kg)   10/11/17 110 lb 6.4 oz (50.1 kg)   10/03/17 108 lb (49 kg)              We Performed the Following     ALLERGY/ASTHMA ADULT REFERRAL          Today's Medication Changes          These changes are accurate as of 1/17/18 11:59 PM.  If you have any questions, ask your nurse or doctor.               Start taking these medicines.        Dose/Directions    carvedilol 12.5 MG tablet   Commonly known as:  COREG   Used for:  Hypertension secondary to other renal disorders   Started by:  Delicia Parra MD        Dose:  12.5 mg   Take 1 tablet (12.5 mg) by mouth 2 times daily (with meals)   Quantity:  60 tablet   Refills:  11       diphenhydrAMINE 25 MG tablet   Commonly known as:  BENADRYL   Used for:  Anaphylaxis, subsequent encounter   Started by:  Manjula Gamez " MD Tracey        Dose:  25-50 mg   Take 1-2 tablets (25-50 mg) by mouth every 6 hours as needed for itching or allergies   Quantity:  60 tablet   Refills:  1       EPINEPHrine 0.3 MG/0.3ML injection 2-pack   Commonly known as:  EPIPEN/ADRENACLICK/or ANY BX GENERIC EQUIV   Used for:  Anaphylaxis, subsequent encounter   Started by:  Manjula Gamez MD        Dose:  0.3 mg   Inject 0.3 mLs (0.3 mg) into the muscle as needed for anaphylaxis   Quantity:  0.6 mL   Refills:  3         These medicines have changed or have updated prescriptions.        Dose/Directions    lisinopril 20 MG tablet   Commonly known as:  PRINIVIL/ZESTRIL   This may have changed:    - how much to take  - when to take this  - Another medication with the same name was removed. Continue taking this medication, and follow the directions you see here.   Used for:  Secondary hypertension due to renal disease        Dose:  20 mg   Take 1 tablet (20 mg) by mouth daily   Quantity:  90 tablet   Refills:  3            Where to get your medicines      [id number] - [place], [place] - [address] [place] [address] AT [facility] [address]  [address], [place] [address]     Phone:  [phone]     acetaminophen 325 MG tablet    carvedilol 12.5 MG tablet    diphenhydrAMINE 25 MG tablet    docusate sodium 100 MG tablet    EPINEPHrine 0.3 MG/0.3ML injection 2-pack                Primary Care Provider Office Phone # Fax #    Macarena Jackie Bowen [phone]       [facility] [address] [place] [address]        Equal Access to Services     [facility] AH: Hadii aad ku hadasho Soomaali, waaxda luqadaha, qaybta kaalmada adeegyada, waxay brendon hayestevann keshia cool. So [facility] [phone].    ATENCIÓN: Si habla español, tiene a gerber disposición servicios gratuitos de asistencia lingüística. Vikaame al [phone].    We comply with applicable federal civil rights laws and [place]  laws. We do not discriminate on the basis of race, color, national origin, age, disability, sex, sexual orientation, or gender identity.            Thank you!     Thank you for choosing [facility]  for your care. Our goal is always to provide you with excellent care. Hearing back from our patients is one way we can continue to improve our services. Please take a few minutes to complete the written survey that you may receive in the mail after your visit with us. Thank you!             Your Updated Medication List - Protect others around you: Learn how to safely use, store and throw away your medicines at www.disposemymeds.org.          This list is accurate as of 1/17/18 11:59 PM.  Always use your most recent med list.                   Brand Name Dispense Instructions for use Diagnosis    acetaminophen 325 MG tablet    TYLENOL    100 tablet    Take 2 tablets (650 mg) by mouth every 4 hours as needed for mild pain    Back pain, unspecified back location, unspecified back pain laterality, unspecified chronicity       carvedilol 12.5 MG tablet    COREG    60 tablet    Take 1 tablet (12.5 mg) by mouth 2 times daily (with meals)    Hypertension secondary to other renal disorders       diphenhydrAMINE 25 MG tablet    BENADRYL    60 tablet    Take 1-2 tablets (25-50 mg) by mouth every 6 hours as needed for itching or allergies    Anaphylaxis, subsequent encounter       docusate sodium 100 MG tablet    COLACE    60 tablet    Take 100 mg by mouth daily    Constipation, unspecified constipation type       EMLA cream   Generic drug:  lidocaine-prilocaine     30 g    APPLY TO ACCESS SITE 30 TO 60 MINUTES PRIOR TO        DIALYSIS    ESRD (end stage renal disease) on dialysis (H)       EPINEPHrine 0.3 MG/0.3ML injection 2-pack    EPIPEN/ADRENACLICK/or ANY BX GENERIC EQUIV    0.6 mL    Inject 0.3 mLs (0.3 mg) into the muscle as needed for anaphylaxis    Anaphylaxis, subsequent encounter       levETIRAcetam 250 MG  tablet    KEPPRA    60 tablet    Take  1 1/2 tabs daily.  Take an additional 250 mg after dialysis on M, W, F.    Partial symptomatic epilepsy with complex partial seizures, not intractable, without status epilepticus (H)       levothyroxine 25 MCG tablet    SYNTHROID/LEVOTHROID    135 tablet    Take 1 tablet (25 mcg) Tuesday, Thursday, Saturday and Sunday and 1.5 tablets (37.5 mcg) on Monday, Wednesday and Friday every week.    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       NEPHROCAPS 1 MG capsule     30 capsule    Take 1 capsule (1 mg) by mouth daily    ESRD needing dialysis (H)       norethindrone 0.35 MG per tablet    MICRONOR    28 tablet    Take 1 tablet (0.35 mg) by mouth daily    Surveillance of previously prescribed contraceptive pill       ondansetron 4 MG ODT tab    ZOFRAN ODT    60 tablet    Take 1-2 tablets (4-8 mg) by mouth every 8 hours as needed for nausea    Nausea       ranitidine 150 MG tablet    ZANTAC    60 tablet    Take 1 tablet (150 mg) by mouth daily (with breakfast)    Gastroesophageal reflux disease without esophagitis       SENSIPAR 30 MG tablet   Generic drug:  cinacalcet      Take 60 mg by mouth daily        sevelamer 800 MG tablet    RENVELA     Take 800 mg by mouth 3 times daily (with meals) Patient takes 3-4 tablets 3 times a day and 1 with snack prn        simethicone 125 MG Chew chewable tablet    MYLICON     Take 125 mg by mouth 2 times daily

## 2018-01-17 NOTE — NURSING NOTE
Epipen administration teaching done with Mona, can administer through clothing-jeans. Reviewed when to use Epipen, if after use of 1 Epipen not effective or improved should be taken to ED. Side effects of rapid or pounding heartbeat reviewed with patient. Mona states understanding, no further question regarding Epipen administration. RLbasilio RN

## 2018-01-29 DIAGNOSIS — I15.1 HYPERTENSION SECONDARY TO OTHER RENAL DISORDERS: Primary | ICD-10-CM

## 2018-01-29 RX ORDER — DOXAZOSIN 1 MG/1
2 TABLET ORAL AT BEDTIME
Qty: 90 TABLET | Refills: 3 | Status: SHIPPED | OUTPATIENT
Start: 2018-01-29 | End: 2018-05-29

## 2018-01-29 RX ORDER — AMLODIPINE BESYLATE 10 MG/1
10 TABLET ORAL DAILY
Qty: 90 TABLET | Refills: 3 | Status: SHIPPED | OUTPATIENT
Start: 2018-01-29 | End: 2018-05-29

## 2018-01-29 NOTE — PROGRESS NOTES
D/kaden carvedilol due to side effect of fatigue    Started doxazosin 2 mg po q hs which she has taken in past    Continue amlodipine 10 mg daily but change to morning    Delicia Parra MD  [facility]  Department of Medicine  Division of Renal Disease and Hypertension

## 2018-02-14 ENCOUNTER — TELEPHONE (OUTPATIENT)
Dept: TRANSPLANT | Facility: CLINIC | Age: 28
End: 2018-02-14

## 2018-02-14 NOTE — TELEPHONE ENCOUNTER
Returned a call to Mona who had some questions about her status. She wondered if she was still active. She wondered the wait time for receiving  organ offers.   She had questions about possible living donors through social media from out of state, and if they are considered, how is their transportation paid for. I reviewed there are some marita monies available if they are approved to come for evaluation. I stated that once the potential donors speak with the intake nurse, she can bring in the  to work with them and their finances.     She inquired about paired exchange, and how that would work with out of town donors. Reviewed they are evaluated the same as direct donors. Reviewed general legistics of paired exchange consent, registries, math runs and identified swaps. Stated that generally, once identified in an exchange, the actual surgery is scheduled 2 to 6 weeks in advance.   She inquired about how the kidneys arrive and travel.     Attempted to answer all Mona's questions. She is requested information about paired exchange be emailed or MyChart messaged to her so she can share with potential donors should they ask.     Routing conversation to the paired exchange coordinator.

## 2018-02-21 ENCOUNTER — TELEPHONE (OUTPATIENT)
Dept: TRANSPLANT | Facility: CLINIC | Age: 28
End: 2018-02-21

## 2018-02-21 NOTE — PROGRESS NOTES
Preceptor Attestation:  Patient's case reviewed and discussed with Manjula Gamez MD resident and I evaluated the patient. I agree with written assessment and plan of care.  Supervising Physician:  Joseluis Freire MD MD  [facility]

## 2018-02-22 ENCOUNTER — OFFICE VISIT (OUTPATIENT)
Dept: NEUROLOGY | Facility: CLINIC | Age: 28
End: 2018-02-22
Payer: MEDICARE

## 2018-02-22 VITALS
WEIGHT: 111 LBS | SYSTOLIC BLOOD PRESSURE: 156 MMHG | HEART RATE: 90 BPM | TEMPERATURE: 97.2 F | HEIGHT: 60 IN | BODY MASS INDEX: 21.79 KG/M2 | DIASTOLIC BLOOD PRESSURE: 98 MMHG

## 2018-02-22 DIAGNOSIS — G44.209 TENSION HEADACHE: Primary | ICD-10-CM

## 2018-02-22 DIAGNOSIS — G40.209 PARTIAL SYMPTOMATIC EPILEPSY WITH COMPLEX PARTIAL SEIZURES, NOT INTRACTABLE, WITHOUT STATUS EPILEPTICUS (H): ICD-10-CM

## 2018-02-22 RX ORDER — LEVETIRACETAM 250 MG/1
TABLET ORAL
Qty: 175 TABLET | Refills: 1 | Status: SHIPPED | OUTPATIENT
Start: 2018-02-22 | End: 2018-06-19

## 2018-02-22 RX ORDER — CALCIUM CARBONATE 750 MG/1
750 TABLET, CHEWABLE ORAL 3 TIMES DAILY
COMMUNITY
End: 2019-01-29

## 2018-02-22 RX ORDER — NORTRIPTYLINE HCL 10 MG
10 CAPSULE ORAL AT BEDTIME
Qty: 60 CAPSULE | Refills: 3 | Status: SHIPPED | OUTPATIENT
Start: 2018-02-22 | End: 2018-05-29

## 2018-02-22 NOTE — MR AVS SNAPSHOT
After Visit Summary   2/22/2018    Mona Wagner    MRN: [id number]           Patient Information     Date Of Birth          1990        Visit Information        Provider Department      2/22/2018 8:30 AM Theresa Sapp MD [facility]        Today's Diagnoses     Tension headache    -  1    Partial symptomatic epilepsy with complex partial seizures, not intractable, without status epilepticus (H)           Follow-ups after your visit        Follow-up notes from your care team     Return in about 6 months (around 8/22/2018).      Your next 10 appointments already scheduled     Apr 03, 2018 10:00 AM CDT   (Arrive by 9:45 AM)   RETURN ENDOCRINE with Melissa Coles MD   [facility] ([facility] [place])    [address]  3rd Floor  [facility] [address] [phone]            Aug 23, 2018 10:00 AM CDT   Return Visit with MD [facility] ([facility])    [address], [address]  [facility] [address] [phone]              Who to contact     Please call your clinic at [phone] to:    Ask questions about your health    Make or cancel appointments    Discuss your medicines    Learn about your test results    Speak to your doctor            Additional Information About Your Visit        [employer] Information     [employer] gives you secure access to your electronic health record. If you see a primary care provider, you can also send messages to your care team and make appointments. If you have questions, please call your primary care clinic.  If you do not have a primary care provider, please call [phone] and they will assist you.      [employer] is an electronic gateway that provides easy, online access to your medical records. With [employer], you can request a clinic appointment, read your test results, renew a prescription or communicate with your care team.     To access  "your existing account, please contact your [facility] or call [phone] for assistance.        Care EveryWhere ID     This is your Care EveryWhere ID. This could be used by other organizations to access your [facility] medical records  [id number]        Your Vitals Were     Pulse Temperature Height Breastfeeding? BMI (Body Mass Index)       90 97.2  F (36.2  C) (Tympanic) 5' 0.43\" (153.5 cm) No 21.37 kg/m2        Blood Pressure from Last 3 Encounters:   02/22/18 (!) 156/98   01/17/18 (!) 156/96   10/11/17 (!) 165/106    Weight from Last 3 Encounters:   02/22/18 111 lb (50.3 kg)   01/17/18 110 lb (49.9 kg)   10/11/17 110 lb 6.4 oz (50.1 kg)              Today, you had the following     No orders found for display         Today's Medication Changes          These changes are accurate as of 2/22/18  9:22 AM.  If you have any questions, ask your nurse or doctor.               Start taking these medicines.        Dose/Directions    nortriptyline 10 MG capsule   Commonly known as:  PAMELOR   Used for:  Tension headache   Started by:  Theresa Sapp MD        Dose:  10 mg   Take 1 capsule (10 mg) by mouth At Bedtime   Quantity:  60 capsule   Refills:  3            Where to get your medicines      [id number] - [place], [place] - [address] AT [facility] & [address]  [address], [place] [address]     Phone:  [phone]     levETIRAcetam 250 MG tablet    nortriptyline 10 MG capsule                Primary Care Provider Office Phone # Fax #    Macarena Bowen [phone]       [facility] [address] [place] [address]        Equal Access to Services     RUCHI VILLAGRAN AH: Dionisio guzmano Solina, waaxda luqadaha, qaybta kaalmada adeegyada, waxiona cool. So [facility] [phone].    ATENCIÓN: Si habla español, tiene a gerber disposición servicios gratuitos de " asistencia lingüística. Boubacar al [phone].    We comply with applicable federal civil rights laws and [place] laws. We do not discriminate on the basis of race, color, national origin, age, disability, sex, sexual orientation, or gender identity.            Thank you!     Thank you for choosing [facility]  for your care. Our goal is always to provide you with excellent care. Hearing back from our patients is one way we can continue to improve our services. Please take a few minutes to complete the written survey that you may receive in the mail after your visit with us. Thank you!             Your Updated Medication List - Protect others around you: Learn how to safely use, store and throw away your medicines at www.disposemymeds.org.          This list is accurate as of 2/22/18  9:22 AM.  Always use your most recent med list.                   Brand Name Dispense Instructions for use Diagnosis    acetaminophen 325 MG tablet    TYLENOL    100 tablet    Take 2 tablets (650 mg) by mouth every 4 hours as needed for mild pain    Back pain, unspecified back location, unspecified back pain laterality, unspecified chronicity       amLODIPine 10 MG tablet    NORVASC    90 tablet    Take 1 tablet (10 mg) by mouth daily In morning    Hypertension secondary to other renal disorders       diphenhydrAMINE 25 MG tablet    BENADRYL    60 tablet    Take 1-2 tablets (25-50 mg) by mouth every 6 hours as needed for itching or allergies    Anaphylaxis, subsequent encounter       docusate sodium 100 MG tablet    COLACE    60 tablet    Take 100 mg by mouth daily    Constipation, unspecified constipation type       doxazosin 1 MG tablet    CARDURA    90 tablet    Take 2 tablets (2 mg) by mouth At Bedtime    Hypertension secondary to other renal disorders       EMLA cream   Generic drug:  lidocaine-prilocaine     30 g    APPLY TO ACCESS SITE 30 TO 60 MINUTES PRIOR TO        DIALYSIS    ESRD (end stage renal disease) on  dialysis (H)       EPINEPHrine 0.3 MG/0.3ML injection 2-pack    EPIPEN/ADRENACLICK/or ANY BX GENERIC EQUIV    0.6 mL    Inject 0.3 mLs (0.3 mg) into the muscle as needed for anaphylaxis    Anaphylaxis, subsequent encounter       levETIRAcetam 250 MG tablet    KEPPRA    175 tablet    Take  1 1/2 tabs daily.  Take an additional 250 mg after dialysis on M, W, F.    Partial symptomatic epilepsy with complex partial seizures, not intractable, without status epilepticus (H)       levothyroxine 25 MCG tablet    SYNTHROID/LEVOTHROID    135 tablet    Take 1 tablet (25 mcg) Tuesday, Thursday, Saturday and Sunday and 1.5 tablets (37.5 mcg) on Monday, Wednesday and Friday every week.    Acquired hypothyroidism       NEPHROCAPS 1 MG capsule     30 capsule    Take 1 capsule (1 mg) by mouth daily    ESRD needing dialysis (H)       norethindrone 0.35 MG per tablet    MICRONOR    28 tablet    Take 1 tablet (0.35 mg) by mouth daily    Surveillance of previously prescribed contraceptive pill       nortriptyline 10 MG capsule    PAMELOR    60 capsule    Take 1 capsule (10 mg) by mouth At Bedtime    Tension headache       ondansetron 4 MG ODT tab    ZOFRAN ODT    60 tablet    Take 1-2 tablets (4-8 mg) by mouth every 8 hours as needed for nausea    Nausea       ranitidine 150 MG tablet    ZANTAC    60 tablet    Take 1 tablet (150 mg) by mouth daily (with breakfast)    Gastroesophageal reflux disease without esophagitis       SENSIPAR 30 MG tablet   Generic drug:  cinacalcet      Take 60 mg by mouth daily        sevelamer 800 MG tablet    RENVELA     Take 800 mg by mouth 3 times daily (with meals) Patient takes 3-4 tablets 3 times a day and 1 with snack prn        simethicone 125 MG Chew chewable tablet    MYLICON     Take 125 mg by mouth 2 times daily        TUMS CHEWY BITES 750 MG Chew   Generic drug:  calcium carbonate      Take 750 mg by mouth 3 times daily

## 2018-02-22 NOTE — LETTER
2018       RE: Mona Wagner  : 1990   MRN: [id number]      Dear Colleague,    Thank you for referring your patient, Mona Wagner, to the [facility] at [facility]. Please see a copy of my visit note below.    [facility] Progress Note      Patient:  Mona Wagner  :  1990   Age:  27 year old   Today's Office Visit:  2018    Epilepsy Data:    History of 2 seizures on one day 09/15/2014, first one, probable CPS and second one GTC seizure.  Started on Dilantin in the hospital, which was switched to Keppra with renally adjusted dose, after being seen at [facility].  No further seizures afterwards.      History of Present Illness:    The patient did not have any seizures since last visit in 2017.  She is taking Keppra 375 mg daily and 250 mg after dialysis.  She has had HAs almost every day for the last few months.  They have been in the right side and right posterior area.  It feels pressure and sometimes gets sharp.  Is usually 4/10.  Denies vision changes, dizziness, or nausea.  Light rarely bother her.  They last almost all day.  She is taking Tylenol 2 tabs 325 mg every day.  She had headaches in the past off and on, but they weren't this frequent.  She had a brain MRI on 10/15/2017 which was unremarkable.     Current Outpatient Prescriptions   Medication Sig Dispense Refill     calcium carbonate (TUMS CHEWY BITES) 750 MG CHEW Take 750 mg by mouth 3 times daily       amLODIPine (NORVASC) 10 MG tablet Take 1 tablet (10 mg) by mouth daily In morning 90 tablet 3     doxazosin (CARDURA) 1 MG tablet Take 2 tablets (2 mg) by mouth At Bedtime 90 tablet 3     docusate sodium (COLACE) 100 MG tablet Take 100 mg by mouth daily 60 tablet 1     acetaminophen (TYLENOL) 325 MG tablet Take 2 tablets (650 mg) by mouth every 4 hours as needed for mild pain 100 tablet 1     diphenhydrAMINE (BENADRYL) 25 MG tablet Take 1-2 tablets (25-50 mg) by mouth every 6  hours as needed for itching or allergies 60 tablet 1     EPINEPHrine (EPIPEN/ADRENACLICK/OR ANY BX GENERIC EQUIV) 0.3 MG/0.3ML injection 2-pack Inject 0.3 mLs (0.3 mg) into the muscle as needed for anaphylaxis 0.6 mL 3     ranitidine (ZANTAC) 150 MG tablet Take 1 tablet (150 mg) by mouth daily (with breakfast) 60 tablet 1     cinacalcet (SENSIPAR) 30 MG tablet Take 60 mg by mouth daily        simethicone (MYLICON) 125 MG CHEW chewable tablet Take 125 mg by mouth 2 times daily       levETIRAcetam (KEPPRA) 250 MG tablet Take  1 1/2 tabs daily.  Take an additional 250 mg after dialysis on M, W, F. 60 tablet 5     EMLA cream APPLY TO ACCESS SITE 30 TO 60 MINUTES PRIOR TO        DIALYSIS 30 g 11     norethindrone (MICRONOR) 0.35 MG per tablet Take 1 tablet (0.35 mg) by mouth daily 28 tablet 11     ondansetron (ZOFRAN ODT) 4 MG ODT tab Take 1-2 tablets (4-8 mg) by mouth every 8 hours as needed for nausea 60 tablet 1     levothyroxine (SYNTHROID/LEVOTHROID) 25 MCG tablet Take 1 tablet (25 mcg) Tuesday, Thursday, Saturday and Sunday and 1.5 tablets (37.5 mcg) on Monday, Wednesday and Friday every week. 135 tablet 3     sevelamer (RENVELA) 800 MG tablet Take 800 mg by mouth 3 times daily (with meals) Patient takes 3-4 tablets 3 times a day and 1 with snack prn       B complex-C-folic acid (NEPHROCAPS) 1 MG capsule Take 1 capsule (1 mg) by mouth daily 30 capsule 3      Results for LIZZETTE PALMER (MRN [id number]) as of 2/22/2018 08:57   Ref. Range 7/6/2017 14:03   Levetiracetam Latest Ref Range: 6.0 - 46.0 ug/mL 19.8     Perceived AED Side Effects:  Uncertain    Medication Notes:        AED Medication Compliance:  compliant all of the time    Review of Systems:  Lethargy / Tiredness:  Yes  Nausea / Vomiting:  No  Double Vision:  No  Sleepiness:  Yes  Depression:  No  Slowed Cognitive Function:  No  Poor Balance:  No  Dizziness:  No  Appetite Changes:  No  Blurred Vision:  No  Decreased Libido:  No  Sleep Changes:  No  Behavioral  "Changes:  No  Skin: negative  Respiratory: No shortness of breath and No cough  Cardiovascular: negative  Have you experienced a traumatic fall since your last visit: NO    Other Issues:    Is patient safe to drive:  Yes    Exam:    BP (!) 156/98 (BP Location: Right arm, Patient Position: Chair, Cuff Size: Adult Regular)  Pulse 90  Temp 97.2  F (36.2  C) (Tympanic)  Ht 5' 0.43\" (153.5 cm)  Wt 111 lb (50.3 kg)  Breastfeeding? No  BMI 21.37 kg/m2     Wt Readings from Last 5 Encounters:   02/22/18 111 lb (50.3 kg)   01/17/18 110 lb (49.9 kg)   10/11/17 110 lb 6.4 oz (50.1 kg)   10/03/17 108 lb (49 kg)   07/06/17 105 lb (47.6 kg)     General Appearance: Alert, awake, cooperative and pleasant, NAD  Gait and tandem gait steady  Attention Span: Normal   Language: No aphasia or dysarthria  Extraocular Movements: intact  Coordination: Normal FNF  Facial Sensation: Normal  Facial Strength: Normal  Tongue Strength: Normal  Limb exam: 5/5 bilaterally with normal tone and bulk, no pronator drift     Assessment and Plan:      1) Localization-related epilepsy, due to right frontal SAH.  She had no seizures since last visit.  Her last brain MRI did not show blood.  Since the patient was seizure-free for approximately 3.5 years, and blood products were not seen in the new MRI, we may consider tapering Keppra in the near future if her EEG did not show any epileptiform discharges.  She denies side effects.  Will continue Keppra for now.      2) Constant daily headaches: her brain MRI 10/15/2017 was unremarkable.  It does not have migrainous features.  She has been using Tylenol daily for the past few months; there is a concern for chronic daily headaches.  I discussed starting her on nortriptyline to decrease the frequency of HAs.  Also I advised her to limit OTC pain medications as much as she can.     - Continue LVT as before.  - Start nortriptyline 10 mg at bedtime.  - RTC in 6 months          As described above, I met with " the patient for 20 minutes and during this time counseling was greater than 50% of the visit time.  Theresa Sapp MD                      Again, thank you for allowing me to participate in the care of your patient.      Sincerely,    Theresa Sapp MD

## 2018-02-22 NOTE — PROGRESS NOTES
[facility] Progress Note      Patient:  Mona Wagner  :  1990   Age:  27 year old   Today's Office Visit:  2018    Epilepsy Data:    History of 2 seizures on one day 09/15/2014, first one, probable CPS and second one GTC seizure.  Started on Dilantin in the hospital, which was switched to Keppra with renally adjusted dose, after being seen at [facility].  No further seizures afterwards.      History of Present Illness:    The patient did not have any seizures since last visit in 2017.  She is taking Keppra 375 mg daily and 250 mg after dialysis.  She has had HAs almost every day for the last few months.  They have been in the right side and right posterior area.  It feels pressure and sometimes gets sharp.  Is usually 4/10.  Denies vision changes, dizziness, or nausea.  Light rarely bother her.  They last almost all day.  She is taking Tylenol 2 tabs 325 mg every day.  She had headaches in the past off and on, but they weren't this frequent.  She had a brain MRI on 10/15/2017 which was unremarkable.     Current Outpatient Prescriptions   Medication Sig Dispense Refill     calcium carbonate (TUMS CHEWY BITES) 750 MG CHEW Take 750 mg by mouth 3 times daily       amLODIPine (NORVASC) 10 MG tablet Take 1 tablet (10 mg) by mouth daily In morning 90 tablet 3     doxazosin (CARDURA) 1 MG tablet Take 2 tablets (2 mg) by mouth At Bedtime 90 tablet 3     docusate sodium (COLACE) 100 MG tablet Take 100 mg by mouth daily 60 tablet 1     acetaminophen (TYLENOL) 325 MG tablet Take 2 tablets (650 mg) by mouth every 4 hours as needed for mild pain 100 tablet 1     diphenhydrAMINE (BENADRYL) 25 MG tablet Take 1-2 tablets (25-50 mg) by mouth every 6 hours as needed for itching or allergies 60 tablet 1     EPINEPHrine (EPIPEN/ADRENACLICK/OR ANY BX GENERIC EQUIV) 0.3 MG/0.3ML injection 2-pack Inject 0.3 mLs (0.3 mg) into the muscle as needed for anaphylaxis 0.6 mL 3     ranitidine (ZANTAC) 150 MG tablet Take 1  tablet (150 mg) by mouth daily (with breakfast) 60 tablet 1     cinacalcet (SENSIPAR) 30 MG tablet Take 60 mg by mouth daily        simethicone (MYLICON) 125 MG CHEW chewable tablet Take 125 mg by mouth 2 times daily       levETIRAcetam (KEPPRA) 250 MG tablet Take  1 1/2 tabs daily.  Take an additional 250 mg after dialysis on M, W, F. 60 tablet 5     EMLA cream APPLY TO ACCESS SITE 30 TO 60 MINUTES PRIOR TO        DIALYSIS 30 g 11     norethindrone (MICRONOR) 0.35 MG per tablet Take 1 tablet (0.35 mg) by mouth daily 28 tablet 11     ondansetron (ZOFRAN ODT) 4 MG ODT tab Take 1-2 tablets (4-8 mg) by mouth every 8 hours as needed for nausea 60 tablet 1     levothyroxine (SYNTHROID/LEVOTHROID) 25 MCG tablet Take 1 tablet (25 mcg) Tuesday, Thursday, Saturday and Sunday and 1.5 tablets (37.5 mcg) on Monday, Wednesday and Friday every week. 135 tablet 3     sevelamer (RENVELA) 800 MG tablet Take 800 mg by mouth 3 times daily (with meals) Patient takes 3-4 tablets 3 times a day and 1 with snack prn       B complex-C-folic acid (NEPHROCAPS) 1 MG capsule Take 1 capsule (1 mg) by mouth daily 30 capsule 3      Results for LIZZETTE PALMER (MRN [id number]) as of 2/22/2018 08:57   Ref. Range 7/6/2017 14:03   Levetiracetam Latest Ref Range: 6.0 - 46.0 ug/mL 19.8     Perceived AED Side Effects:  Uncertain    Medication Notes:        AED Medication Compliance:  compliant all of the time    Review of Systems:  Lethargy / Tiredness:  Yes  Nausea / Vomiting:  No  Double Vision:  No  Sleepiness:  Yes  Depression:  No  Slowed Cognitive Function:  No  Poor Balance:  No  Dizziness:  No  Appetite Changes:  No  Blurred Vision:  No  Decreased Libido:  No  Sleep Changes:  No  Behavioral Changes:  No  Skin: negative  Respiratory: No shortness of breath and No cough  Cardiovascular: negative  Have you experienced a traumatic fall since your last visit: NO    Other Issues:    Is patient safe to drive:  Yes    Exam:    BP (!) 156/98 (BP Location:  "Right arm, Patient Position: Chair, Cuff Size: Adult Regular)  Pulse 90  Temp 97.2  F (36.2  C) (Tympanic)  Ht 5' 0.43\" (153.5 cm)  Wt 111 lb (50.3 kg)  Breastfeeding? No  BMI 21.37 kg/m2     Wt Readings from Last 5 Encounters:   02/22/18 111 lb (50.3 kg)   01/17/18 110 lb (49.9 kg)   10/11/17 110 lb 6.4 oz (50.1 kg)   10/03/17 108 lb (49 kg)   07/06/17 105 lb (47.6 kg)     General Appearance: Alert, awake, cooperative and pleasant, NAD  Gait and tandem gait steady  Attention Span: Normal   Language: No aphasia or dysarthria  Extraocular Movements: intact  Coordination: Normal FNF  Facial Sensation: Normal  Facial Strength: Normal  Tongue Strength: Normal  Limb exam: 5/5 bilaterally with normal tone and bulk, no pronator drift     Assessment and Plan:      1) Localization-related epilepsy, due to right frontal SAH.  She had no seizures since last visit.  Her last brain MRI did not show blood.  Since the patient was seizure-free for approximately 3.5 years, and blood products were not seen in the new MRI, we may consider tapering Keppra in the near future if her EEG did not show any epileptiform discharges.  She denies side effects.  Will continue Keppra for now.      2) Constant daily headaches: her brain MRI 10/15/2017 was unremarkable.  It does not have migrainous features.  She has been using Tylenol daily for the past few months; there is a concern for chronic daily headaches.  I discussed starting her on nortriptyline to decrease the frequency of HAs.  Also I advised her to limit OTC pain medications as much as she can.     - Continue LVT as before.  - Start nortriptyline 10 mg at bedtime.  - RTC in 6 months          As described above, I met with the patient for 20 minutes and during this time counseling was greater than 50% of the visit time.  Theresa Sapp MD                    "

## 2018-03-05 ENCOUNTER — TELEPHONE (OUTPATIENT)
Dept: TRANSPLANT | Facility: CLINIC | Age: 28
End: 2018-03-05

## 2018-03-05 NOTE — TELEPHONE ENCOUNTER
Coordinator returned pt's call and left msg. Provided donor teams phone number. Reinforced potential donors need to call the donor team and as a recipient coordinator I am not allowed to know if any potential donors have signed up.

## 2018-03-07 ENCOUNTER — RESULTS ONLY (OUTPATIENT)
Dept: OTHER | Facility: CLINIC | Age: 28
End: 2018-03-07

## 2018-03-07 DIAGNOSIS — Z76.82 ORGAN TRANSPLANT CANDIDATE: ICD-10-CM

## 2018-03-07 DIAGNOSIS — N18.6 END STAGE RENAL DISEASE (H): ICD-10-CM

## 2018-03-13 LAB — PRA SINGLE ANTIGEN IGG ANTIBODY: NORMAL

## 2018-03-28 DIAGNOSIS — N64.3 GALACTORRHEA: ICD-10-CM

## 2018-03-28 DIAGNOSIS — E03.9 ACQUIRED HYPOTHYROIDISM: ICD-10-CM

## 2018-03-28 LAB
PROLACTIN SERPL-MCNC: 38 UG/L (ref 3–27)
T4 FREE SERPL-MCNC: 0.94 NG/DL (ref 0.76–1.46)
TSH SERPL DL<=0.005 MIU/L-ACNC: 1.91 MU/L (ref 0.4–4)

## 2018-03-28 PROCEDURE — 84443 ASSAY THYROID STIM HORMONE: CPT | Performed by: SURGERY

## 2018-03-28 PROCEDURE — 84146 ASSAY OF PROLACTIN: CPT | Performed by: SURGERY

## 2018-03-28 PROCEDURE — 84439 ASSAY OF FREE THYROXINE: CPT | Performed by: SURGERY

## 2018-04-09 DIAGNOSIS — E03.9 ACQUIRED HYPOTHYROIDISM: ICD-10-CM

## 2018-04-10 RX ORDER — LEVOTHYROXINE SODIUM 25 UG/1
TABLET ORAL
Qty: 102 TABLET | Refills: 1 | Status: SHIPPED | OUTPATIENT
Start: 2018-04-10 | End: 2018-09-07

## 2018-05-21 DIAGNOSIS — Z30.41 SURVEILLANCE OF PREVIOUSLY PRESCRIBED CONTRACEPTIVE PILL: ICD-10-CM

## 2018-05-22 ENCOUNTER — TELEPHONE (OUTPATIENT)
Dept: TRANSPLANT | Facility: CLINIC | Age: 28
End: 2018-05-22

## 2018-05-22 ASSESSMENT — ENCOUNTER SYMPTOMS
LOSS OF CONSCIOUSNESS: 0
EXERCISE INTOLERANCE: 0
SLEEP DISTURBANCES DUE TO BREATHING: 0
WEIGHT LOSS: 0
MEMORY LOSS: 0
SPEECH CHANGE: 0
BLOOD IN STOOL: 0
DIARRHEA: 0
VOMITING: 0
BOWEL INCONTINENCE: 0
TINGLING: 0
ALTERED TEMPERATURE REGULATION: 1
HYPERTENSION: 0
TREMORS: 0
ABDOMINAL PAIN: 1
BLOATING: 1
ORTHOPNEA: 0
SEIZURES: 0
CHILLS: 0
POLYPHAGIA: 0
NUMBNESS: 0
LIGHT-HEADEDNESS: 0
HEADACHES: 1
FEVER: 0
PALPITATIONS: 0
HYPOTENSION: 1
NIGHT SWEATS: 1
POLYDIPSIA: 0
WEAKNESS: 0
CONSTIPATION: 1
HALLUCINATIONS: 0
INCREASED ENERGY: 0
FATIGUE: 1
HEARTBURN: 1
WEIGHT GAIN: 1
DIZZINESS: 0
NAUSEA: 0
LEG PAIN: 0
JAUNDICE: 0
DECREASED APPETITE: 0
PARALYSIS: 0
RECTAL PAIN: 0
DISTURBANCES IN COORDINATION: 0
SYNCOPE: 0

## 2018-05-22 NOTE — TELEPHONE ENCOUNTER
Transplant Social Work Services Phone Call      Data: Received request from Gwen Parker, donor , to contact patient regarding [facility].  Intervention: This writer contacted patient and explained the application process.    Assessment: Patient is within income guidelines and asked to have application emailed to her.  Application emailed including reminder to include income verification.  Plan:SW will remain available to assist if further needs are identified.

## 2018-05-25 RX ORDER — ACETAMINOPHEN AND CODEINE PHOSPHATE 120; 12 MG/5ML; MG/5ML
1 SOLUTION ORAL DAILY
Qty: 28 TABLET | Refills: 3 | Status: SHIPPED | OUTPATIENT
Start: 2018-05-25 | End: 2018-09-10

## 2018-05-29 ENCOUNTER — OFFICE VISIT (OUTPATIENT)
Dept: TRANSPLANT | Facility: CLINIC | Age: 28
End: 2018-05-29
Attending: CLINICAL NURSE SPECIALIST
Payer: MEDICARE

## 2018-05-29 ENCOUNTER — OFFICE VISIT (OUTPATIENT)
Dept: ENDOCRINOLOGY | Facility: CLINIC | Age: 28
End: 2018-05-29
Payer: MEDICARE

## 2018-05-29 VITALS
WEIGHT: 113 LBS | TEMPERATURE: 98 F | HEIGHT: 60 IN | HEART RATE: 80 BPM | DIASTOLIC BLOOD PRESSURE: 99 MMHG | RESPIRATION RATE: 18 BRPM | BODY MASS INDEX: 22.19 KG/M2 | SYSTOLIC BLOOD PRESSURE: 150 MMHG | OXYGEN SATURATION: 99 %

## 2018-05-29 VITALS
BODY MASS INDEX: 22.34 KG/M2 | HEART RATE: 58 BPM | DIASTOLIC BLOOD PRESSURE: 94 MMHG | HEIGHT: 60 IN | WEIGHT: 113.8 LBS | SYSTOLIC BLOOD PRESSURE: 141 MMHG

## 2018-05-29 DIAGNOSIS — Z99.2 ESRD ON DIALYSIS (H): Primary | ICD-10-CM

## 2018-05-29 DIAGNOSIS — T82.9XXA COMPLICATION OF VASCULAR ACCESS FOR DIALYSIS, INITIAL ENCOUNTER: ICD-10-CM

## 2018-05-29 DIAGNOSIS — N18.6 ESRD ON DIALYSIS (H): Primary | ICD-10-CM

## 2018-05-29 DIAGNOSIS — E03.9 ACQUIRED HYPOTHYROIDISM: Primary | ICD-10-CM

## 2018-05-29 PROCEDURE — G0463 HOSPITAL OUTPT CLINIC VISIT: HCPCS | Mod: ZF

## 2018-05-29 ASSESSMENT — PAIN SCALES - GENERAL
PAINLEVEL: NO PAIN (0)
PAINLEVEL: NO PAIN (0)

## 2018-05-29 NOTE — LETTER
5/29/2018      RE: Mona Wagner  [address]  [place] [address]       Dialysis Access Service   Progress Note    S:  Ms. Wagner is being seen today for followup of her dialysis access issues.  She reports no issues with the wound, and  no steal syndrome of the distal extremity. S/P left forearm AV fistula (RC) creation on 1/2/2014, and dialyzes with 2 buttonhole sites in left forearm AV fistula without issues. 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 It was noticed 2.5 weeks ago. It started with a small dot in that area, which is getting larger in last week. Dr. Parra (dialysis nephrologist) is aware of skin pigment changes but no recommendation offered.C/O itching in the area and the lateral aspect of aneurysm.  Denies recent medication, food, soap detergent fistula cleansing agent changed. Jordon pain, swelling, loss of sensation or tingling/numbness in the area.  O:  Temp:  [98  F (36.7  C)] 98  F (36.7  C)  Pulse:  [58-80] 80  Resp:  [18] 18  BP: (141-154)/(94-99) 150/99  SpO2:  [99 %] 99 %  GENERAL: alert  Circulation:   Radial pulse 3+  Ulnar pulse  3+   Capillary refill:  capillary refill < 3 sec    Sensory exam:   arm: Normal   [x]           Abnormal   []          Comment:    hand: Normal   [x]           Abnormal   []          Comment:   Motor exam:   arm: Normal   [x]           Abnormal   []          Comment:    hand: Normal   [x]           Abnormal   []          Comment:    Access: L upper extremity wound(s) healed, non-tender, no venous hypertension. Capillary refill < 2 sec ++ thrill present. No edema without apparent infection.  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 Size 0.5 x 0.8 cm skin hyperpigmented area at edge of the interior aspect of aneurysm,  non-tender without skin breakdown and neurological changes.      Assessment & Plan: Ms. Wagner's dialysis access has matured very well at this time point.    1. Continue to monitor skin pigment changes area,  2. May measure size of skin pigmented area  3.  F/U in clinic as needed        We would like to see the patient back in the clinic as needed to assess progress. The patient was counselled to contact our nurse coordinator, ALCIDES Allred CNS (Sum) at [phone] with any questions or concerns.  Thank you for the opportunity to participate in Ms. Wagner's care.    TT: 15 min  CT: 10 min    MIKE Quiroz (Sum)  Dialysis access program   [facility]  Pager # [phone]

## 2018-05-29 NOTE — PROGRESS NOTES
Student Endocrinology Note    Chief Complaint: Hyperprolactinemia   Information obtained from:Patient    Subjective:         HPI: Mona Wagner is a 27 year old year old woman with PMHx of ESRD on dialysis, galactorrhea 2/2 pituitary microadenoma, and hypothyroidism who presents for follow-up of her hypothyroidism and hyperprolactinemia.    Briefly, Mona had galactorrhea that resolved after correction of her hypothyroidism with levothyroxine. She is currently on levothyroxine 25 mg 4x/week and 37.5 mcg 3x/week.      Despite resolution of galactorrhea, her prolactin levels remained mildly elevated. Repeat brain MRI reported no changes in pituitary imaging as compared to previous image study. These were not dedicated pituitary MRIs.  She has never seen a neurosurgeon regarding her pituitary mass.  oMna is feeling well and denies changes in her peripheral vision.     She also takes a contraceptive pill.    Ms. Wagner has IgA nephropathy. She has ESRD and is on hemodialysis.  She is on the transplant list and tells me today she has a person/ a friend from [employer] also to donate her kidney.  She is very excited about this possibility.    Past Medical History:   Diagnosis Date     Anemia in chronic kidney disease      Dialysis patient (H)      End stage renal disease on dialysis (H)      Hypothyroid      Pituitary adenoma (H)        Past Surgical History:   Procedure Laterality Date     CREATE FISTULA ARTERIOVENOUS UPPER EXTREMITY  1/2/2014    Procedure: CREATE FISTULA ARTERIOVENOUS UPPER EXTREMITY;  Left Wrist Arteriovenous Fistula Placement;  Surgeon: Shashi Castro MD;  Location:  OR     [place]/DIALYSIS CATHE[facility]  12/10/2013            Current Outpatient Prescriptions   Medication Sig Dispense Refill     acetaminophen (TYLENOL) 325 MG tablet Take 2 tablets (650 mg) by mouth every 4 hours as needed for mild pain 100 tablet 1     B complex-C-folic acid (NEPHROCAPS) 1 MG capsule Take 1 capsule (1 mg) by mouth daily 30  capsule 3     calcium carbonate (TUMS CHEWY BITES) 750 MG CHEW Take 750 mg by mouth 3 times daily       cinacalcet (SENSIPAR) 30 MG tablet Take 60 mg by mouth daily        diphenhydrAMINE (BENADRYL) 25 MG tablet Take 1-2 tablets (25-50 mg) by mouth every 6 hours as needed for itching or allergies 60 tablet 1     docusate sodium (COLACE) 100 MG tablet Take 100 mg by mouth daily 60 tablet 1     EMLA cream APPLY TO ACCESS SITE 30 TO 60 MINUTES PRIOR TO        DIALYSIS 30 g 11     EPINEPHrine (EPIPEN/ADRENACLICK/OR ANY BX GENERIC EQUIV) 0.3 MG/0.3ML injection 2-pack Inject 0.3 mLs (0.3 mg) into the muscle as needed for anaphylaxis 0.6 mL 3     levETIRAcetam (KEPPRA) 250 MG tablet Take  1 1/2 tabs daily.  Take an additional 250 mg after dialysis on M, W, F. 175 tablet 1     levothyroxine (SYNTHROID/LEVOTHROID) 25 MCG tablet Take 1 tablet (25 mcg) Tuesday, Thursday, Saturday and Sunday and 1.5 tablets (37.5 mcg) on Monday, Wednesday and Friday every week. 102 tablet 1     norethindrone (MICRONOR) 0.35 MG per tablet Take 1 tablet (0.35 mg) by mouth daily 28 tablet 3     ondansetron (ZOFRAN ODT) 4 MG ODT tab Take 1-2 tablets (4-8 mg) by mouth every 8 hours as needed for nausea 60 tablet 1     ranitidine (ZANTAC) 150 MG tablet Take 1 tablet (150 mg) by mouth daily (with breakfast) 60 tablet 1     sevelamer (RENVELA) 800 MG tablet Take 800 mg by mouth 3 times daily (with meals) Patient takes 3-4 tablets 3 times a day and 1 with snack prn       simethicone (MYLICON) 125 MG CHEW chewable tablet Take 125 mg by mouth 2 times daily       VITAMIN D, CHOLECALCIFEROL, PO Take 1,000 Units by mouth daily            Allergies   Allergen Reactions     Chicken Allergy Anaphylaxis     Lip swelling, tight breathing, itchy ears, nausea, diarrhea.     Chlorhexidine Rash     Rash at site     Sulfa Drugs Rash     Muscle stiffness of neck     Lisinopril Swelling     angioedema     Alcohol Swabs [Isopropyl Alcohol] Rash       Family History    Problem Relation Age of Onset     Hypertension Sister      DIABETES Sister      CEREBROVASCULAR DISEASE Sister      KIDNEY DISEASE Mother      KIDNEY DISEASE Sister      CEREBROVASCULAR DISEASE Father      Coronary Artery Disease No family hx of      Breast Cancer No family hx of      Cancer - colorectal No family hx of      Ovarian Cancer No family hx of      Prostate Cancer No family hx of      Other Cancer No family hx of      Asthma No family hx of        Social History     Social History     Marital Status:      Spouse Name: N/A     Number of Children: N/A     Years of Education: N/A     Social History Main Topics     Smoking status: Never Smoker      Smokeless tobacco: Never Used     Alcohol Use: No     Drug Use: No     Sexual Activity:     Partners: Male     Other Topics Concern     None     Social History Narrative    Date of Service:5/15/2013     Name: Mona VALDOVINOS (Month, Day, Year of birth): 1990     MRN: [id number]     New Patient: Yes    Preferred Language: English     Needed: No    County of Residence: [place]    Marital Status:     Household size: 8    Number of Dependents:0     Pregnant: 0    Average Monthly Income: $ 600    Citizenship Status: Citizen    Gave Pt [employer] and [employer] applications       Review of Systems   11 Point ROS is as detailed in the HPI above, as reported by the patient below or negative      Answers for HPI/ROS submitted by the patient on 2018   General Symptoms: Yes  Skin Symptoms: No  HENT Symptoms: No  EYE SYMPTOMS: No  HEART SYMPTOMS: Yes  LUNG SYMPTOMS: No  INTESTINAL SYMPTOMS: Yes  URINARY SYMPTOMS: No  GYNECOLOGIC SYMPTOMS: No  BREAST SYMPTOMS: No  SKELETAL SYMPTOMS: No  BLOOD SYMPTOMS: No  NERVOUS SYSTEM SYMPTOMS: Yes  MENTAL HEALTH SYMPTOMS: No  Weight gain: Yes  Fatigue: Yes  Night sweats: Yes  Feeling hot or cold when others believe the temperature is normal: Yes  Chest pain or pressure: Yes  Low blood pressure:  Yes  Murmurs: Yes  Edema or swelling: Yes  Heart burn or indigestion: Yes  Abdominal pain: Yes  Bloating: Yes  Constipation: Yes  Headache: Yes        Objective:   BP (!) 141/94  Pulse 58  Ht 1.524 m (5')  Wt 51.6 kg (113 lb 12.8 oz)  BMI 22.23 kg/m2  General: pt appears healthy and well; no acute distress  HEENT: hair appears normal; mucous membranes moist; speech is clear; normocephalic   Neck: no lymphadenopathy   Thyroid:  no thyroid enlargement, nodularity, or tenderness  Cardiac: +3/6 holosystolic murmur loudest along LSB; normal S1/S2 and rhythm; no S3/S4, clicks, or rubs; capillary refill <2 sec  Resp: appropriate effort with air entry to lung bases; no increased work of breathing; breath sounds are symmetric; no crackles, wheezes, or rhonchi - lungs clear to auscultation bilaterally   Extremities: +fistula on left upper extremity; no edema or finger clubbing   Derm: no rash, skin lesions, or dryness on face, neck, hands or lower legs  Neuro: symmetric +2/4 biceps and patellar tendon reflexes; no slowing of speech or movements; pt ambulates normally  Psych: pt is appropriately conversational with appropriate affect    In House Labs:   ENDO PITUITARY LABS-[facility] Latest Ref Rng & Units 3/28/2018 7/6/2017   TSH 0.40 - 4.00 mU/L 1.91 2.52   TSH 0.30 - 5.00 uIU/mL     PROLACTIN 3 - 27 ug/L 38 (H)    TSH 0.40 - 4.00 mU/L 1.91 2.52   TSH REFLEX 0.30 - 5.00 uIU/mL     T4 TOTAL 4.5 - 13.9 ug/dL  9.6   T4 FREE 0.76 - 1.46 ng/dL 0.94 1.08       Assessment and Plan:      Mona Wagner is a 27 year old year old woman with a PMHx of ESRD, galactorrhea 2/2 pituitary microadenoma/hypothyroidism who presents for follow-up of hypothyroidism and hyperprolactinemia.       1. Hypothyroidism: Patient has primary hypothyroidism that has been managed with levothyroxine.  She is currently alternating 25 and 37.5 mcg/day as detailed in the HPI above.  TSH is wnl.  Patient is clinically and biochemically euthyroid.     Plan:  1. Repeat  TFT today   2. Adjust levothyroxine dose if indicated    2. Hyperprolactinemia: This is likely secondary to a pituitary microadenoma, and his prolactin remained elevated despite proper treatment of her hypothyroidism.  This is a subcentimeter pituitary adenoma (6.7mm x 5.3mm) that has not changed on subsequent imaging. However no pituitary sequence was done.  It is also important to consider that the patient has end-stage renal disease that can lead to mild elevations in prolactin levels.  It will be interesting to follow prolactin levels once the patient receives a kidney transplant.  -Plan:  1.  We will continue to follow patient's PRL  2. Consider repeating MRI with pituitary sequence  3. Consider cabergoline pending imaging  4. Consult neurosurgery if adenoma grows or pt has symptoms after cabergoline tx (galactorrhea, vision loss, worsening headaches).  Infertility may be difficult to relate to prolactinoma as patient has a severe chronic condition (ESRD)    3. Oligomenorrhea  - ddx: chronic illness (ESRD) vs hypogonadism vs hypothyroidism +/- contraceptive pill effects.  Patient is aware that we can not properly access gonadothropin levels while she is on birth control pills.  Will check PRL.    Test and/or medications prescribed by physician today:  Orders Placed This Encounter   Procedures     TSH     T4 free     I will contact the patient with the test results and see her back in clinic in 6 months or sooner if needed.    Nidhi Coles MD PhD    Division of Endocrinology and Diabetes

## 2018-05-29 NOTE — MR AVS SNAPSHOT
After Visit Summary   5/29/2018    Mona Wagner    MRN: [id number]           Patient Information     Date Of Birth          1990        Visit Information        Provider Department      5/29/2018 10:00 AM GEORGIA Coles MD [facility]        Today's Diagnoses     Acquired hypothyroidism    -  1       Follow-ups after your visit        Your next 10 appointments already scheduled     Aug 23, 2018 10:00 AM CDT   [facility]app [facility]   [facility]    [address], [address]  [facility] [place] [facility] [address]   [phone]            Nov 20, 2018  9:30 AM CST   (Arrive by 9:15 AM)   RETURN ENDOCRINE with MD [facility] ([facility] [place])    [address] [address] [phone]              Who to contact     Please call your clinic at [phone] to:    Ask questions about your health    Make or cancel appointments    Discuss your medicines    Learn about your test results    Speak to your doctor            Additional Information About Your Visit        [employer] Information     [employer] gives you secure access to your electronic health record. If you see a primary care provider, you can also send messages to your care team and make appointments. If you have questions, please call your primary care clinic.  If you do not have a primary care provider, please call [phone] and they will assist you.      [employer] is an electronic gateway that provides easy, online access to your medical records. With [employer], you can request a clinic appointment, read your test results, renew a prescription or communicate with your care team.     To access your existing account, please contact your [facility] or call [phone] for assistance.        Care EveryWhere ID     This is your Care EveryWhere ID. This  could be used by other organizations to access your [place] medical records  [id number]        Your Vitals Were     Pulse Height BMI (Body Mass Index)             58 1.524 m (5') 22.23 kg/m2          Blood Pressure from Last 3 Encounters:   05/29/18 (!) 150/99   05/29/18 (!) 141/94   02/22/18 (!) 156/98    Weight from Last 3 Encounters:   05/29/18 51.3 kg (113 lb)   05/29/18 51.6 kg (113 lb 12.8 oz)   02/22/18 50.3 kg (111 lb)               Primary Care Provider Office Phone # Fax #    [place] Jackie Bowen [phone]       [facility] [address] [place] [address]        Equal Access to Services     [facility] Hospital[facility] : Hadii aad ku hadasho Soomaali, waaxda luqadaha, qaybta kaalmada adeegyada, waxay brendon haysatish tierney . So [facility] [phone].    ATENCIÓN: Si habla español, tiene a gerber disposición servicios gratuitos de asistencia lingüística. Boubacar al [phone].    We comply with applicable federal civil rights laws and [place] laws. We do not discriminate on the basis of race, color, national origin, age, disability, sex, sexual orientation, or gender identity.            Thank you!     Thank you for choosing [facility]  for your care. Our goal is always to provide you with excellent care. Hearing back from our patients is one way we can continue to improve our services. Please take a few minutes to complete the written survey that you may receive in the mail after your visit with us. Thank you!             Your Updated Medication List - Protect others around you: Learn how to safely use, store and throw away your medicines at www.disposemymeds.org.          This list is accurate as of 5/29/18 11:59 PM.  Always use your most recent med list.                   Brand Name Dispense Instructions for use Diagnosis    acetaminophen 325 MG tablet    TYLENOL    100 tablet    Take 2 tablets (650 mg) by mouth every 4 hours as needed for mild pain    Back pain,  unspecified back location, unspecified back pain laterality, unspecified chronicity       diphenhydrAMINE 25 MG tablet    BENADRYL    60 tablet    Take 1-2 tablets (25-50 mg) by mouth every 6 hours as needed for itching or allergies    Anaphylaxis, subsequent encounter       docusate sodium 100 MG tablet    COLACE    60 tablet    Take 100 mg by mouth daily    Constipation, unspecified constipation type       EMLA cream   Generic drug:  lidocaine-prilocaine     30 g    APPLY TO ACCESS SITE 30 TO 60 MINUTES PRIOR TO        DIALYSIS    ESRD (end stage renal disease) on dialysis (H)       EPINEPHrine 0.3 MG/0.3ML injection 2-pack    EPIPEN/ADRENACLICK/or ANY BX GENERIC EQUIV    0.6 mL    Inject 0.3 mLs (0.3 mg) into the muscle as needed for anaphylaxis    Anaphylaxis, subsequent encounter       levETIRAcetam 250 MG tablet    KEPPRA    175 tablet    Take  1 1/2 tabs daily.  Take an additional 250 mg after dialysis on M, W, F.    Partial symptomatic epilepsy with complex partial seizures, not intractable, without status epilepticus (H)       levothyroxine 25 MCG tablet    SYNTHROID/LEVOTHROID    102 tablet    Take 1 tablet (25 mcg) Tuesday, Thursday, Saturday and Sunday and 1.5 tablets (37.5 mcg) on Monday, Wednesday and Friday every week.    Acquired hypothyroidism       NEPHROCAPS 1 MG capsule     30 capsule    Take 1 capsule (1 mg) by mouth daily    ESRD needing dialysis (H)       norethindrone 0.35 MG per tablet    MICRONOR    28 tablet    Take 1 tablet (0.35 mg) by mouth daily    Surveillance of previously prescribed contraceptive pill       ondansetron 4 MG ODT tab    ZOFRAN ODT    60 tablet    Take 1-2 tablets (4-8 mg) by mouth every 8 hours as needed for nausea    Nausea       ranitidine 150 MG tablet    ZANTAC    60 tablet    Take 1 tablet (150 mg) by mouth daily (with breakfast)    Gastroesophageal reflux disease without esophagitis       SENSIPAR 30 MG tablet   Generic drug:  cinacalcet      Take 60 mg by mouth  daily        sevelamer 800 MG tablet    RENVELA     Take 800 mg by mouth 3 times daily (with meals) Patient takes 3-4 tablets 3 times a day and 1 with snack prn        simethicone 125 MG Chew chewable tablet    MYLICON     Take 125 mg by mouth 2 times daily        TUMS CHEWY BITES 750 MG Chew   Generic drug:  calcium carbonate      Take 750 mg by mouth 3 times daily        VITAMIN D (CHOLECALCIFEROL) PO      Take 1,000 Units by mouth daily

## 2018-05-29 NOTE — LETTER
5/29/2018       RE: Mona Wagner  [address]  [place] [address]     Dear Colleague,    Thank you for referring your patient, Mona Wagner, to the [facility] ENDOCRINOLOGY at [facility]. Please see a copy of my visit note below.    Student Endocrinology Note    Chief Complaint: Hyperprolactinemia   Information obtained from:Patient    Subjective:         HPI: Mona Wagner is a 27 year old year old woman with PMHx of ESRD on dialysis, galactorrhea 2/2 pituitary microadenoma, and hypothyroidism who presents for follow-up of her hypothyroidism and hyperprolactinemia.    Briefly, Mona had galactorrhea that resolved after correction of her hypothyroidism with levothyroxine. She is currently on levothyroxine 25 mg 4x/week and 37.5 mcg 3x/week.      Despite resolution of galactorrhea, her prolactin levels remained mildly elevated. Repeat brain MRI reported no changes in pituitary imaging as compared to previous image study. These were not dedicated pituitary MRIs.  She has never seen a neurosurgeon regarding her pituitary mass.  Mona is feeling well and denies changes in her peripheral vision.     She also takes a contraceptive pill.    Ms. Wagner has IgA nephropathy. She has ESRD and is on hemodialysis.  She is on the transplant list and tells me today she has a person/ a friend from [employer] also to donate her kidney.  She is very excited about this possibility.    Past Medical History:   Diagnosis Date     Anemia in chronic kidney disease      Dialysis patient (H)      End stage renal disease on dialysis (H)      Hypothyroid      Pituitary adenoma (H)        Past Surgical History:   Procedure Laterality Date     CREATE FISTULA ARTERIOVENOUS UPPER EXTREMITY  1/2/2014    Procedure: CREATE FISTULA ARTERIOVENOUS UPPER EXTREMITY;  Left Wrist Arteriovenous Fistula Placement;  Surgeon: Shashi Castro MD;  Location: [facility] OR     [place]/DIALYSIS CATHETER  12/10/2013            Current  Outpatient Prescriptions   Medication Sig Dispense Refill     acetaminophen (TYLENOL) 325 MG tablet Take 2 tablets (650 mg) by mouth every 4 hours as needed for mild pain 100 tablet 1     B complex-C-folic acid (NEPHROCAPS) 1 MG capsule Take 1 capsule (1 mg) by mouth daily 30 capsule 3     calcium carbonate (TUMS CHEWY BITES) 750 MG CHEW Take 750 mg by mouth 3 times daily       cinacalcet (SENSIPAR) 30 MG tablet Take 60 mg by mouth daily        diphenhydrAMINE (BENADRYL) 25 MG tablet Take 1-2 tablets (25-50 mg) by mouth every 6 hours as needed for itching or allergies 60 tablet 1     docusate sodium (COLACE) 100 MG tablet Take 100 mg by mouth daily 60 tablet 1     EMLA cream APPLY TO ACCESS SITE 30 TO 60 MINUTES PRIOR TO        DIALYSIS 30 g 11     EPINEPHrine (EPIPEN/ADRENACLICK/OR ANY BX GENERIC EQUIV) 0.3 MG/0.3ML injection 2-pack Inject 0.3 mLs (0.3 mg) into the muscle as needed for anaphylaxis 0.6 mL 3     levETIRAcetam (KEPPRA) 250 MG tablet Take  1 1/2 tabs daily.  Take an additional 250 mg after dialysis on M, W, F. 175 tablet 1     levothyroxine (SYNTHROID/LEVOTHROID) 25 MCG tablet Take 1 tablet (25 mcg) Tuesday, Thursday, Saturday and Sunday and 1.5 tablets (37.5 mcg) on Monday, Wednesday and Friday every week. 102 tablet 1     norethindrone (MICRONOR) 0.35 MG per tablet Take 1 tablet (0.35 mg) by mouth daily 28 tablet 3     ondansetron (ZOFRAN ODT) 4 MG ODT tab Take 1-2 tablets (4-8 mg) by mouth every 8 hours as needed for nausea 60 tablet 1     ranitidine (ZANTAC) 150 MG tablet Take 1 tablet (150 mg) by mouth daily (with breakfast) 60 tablet 1     sevelamer (RENVELA) 800 MG tablet Take 800 mg by mouth 3 times daily (with meals) Patient takes 3-4 tablets 3 times a day and 1 with snack prn       simethicone (MYLICON) 125 MG CHEW chewable tablet Take 125 mg by mouth 2 times daily       VITAMIN D, CHOLECALCIFEROL, PO Take 1,000 Units by mouth daily            Allergies   Allergen Reactions     Chicken  Allergy Anaphylaxis     Lip swelling, tight breathing, itchy ears, nausea, diarrhea.     Chlorhexidine Rash     Rash at site     Sulfa Drugs Rash     Muscle stiffness of neck     Lisinopril Swelling     angioedema     Alcohol Swabs [Isopropyl Alcohol] Rash       Family History   Problem Relation Age of Onset     Hypertension Sister      DIABETES Sister      CEREBROVASCULAR DISEASE Sister      KIDNEY DISEASE Mother      KIDNEY DISEASE Sister      CEREBROVASCULAR DISEASE Father      Coronary Artery Disease No family hx of      Breast Cancer No family hx of      Cancer - colorectal No family hx of      Ovarian Cancer No family hx of      Prostate Cancer No family hx of      Other Cancer No family hx of      Asthma No family hx of        Social History     Social History     Marital Status:      Spouse Name: N/A     Number of Children: N/A     Years of Education: N/A     Social History Main Topics     Smoking status: Never Smoker      Smokeless tobacco: Never Used     Alcohol Use: No     Drug Use: No     Sexual Activity:     Partners: Male     Other Topics Concern     None     Social History Narrative    Date of Service:5/15/2013     Name: Mona VALDOVINOS (Month, Day, Year of birth): 1990     MRN: [id number]     New Patient: Yes    Preferred Language: English     Needed: No    County of Residence: [place]    Marital Status:     Household size: 8    Number of Dependents:0     Pregnant: 0    Average Monthly Income: $ 600    Citizenship Status: Citizen    Gave Pt [employer] and [employer] applications       Review of Systems   11 Point ROS is as detailed in the HPI above, as reported by the patient below or negative        Objective:   BP (!) 141/94  Pulse 58  Ht 1.524 m (5')  Wt 51.6 kg (113 lb 12.8 oz)  BMI 22.23 kg/m2  General: pt appears healthy and well; no acute distress  HEENT: hair appears normal; mucous membranes moist; speech is clear; normocephalic   Neck: no lymphadenopathy    Thyroid:  no thyroid enlargement, nodularity, or tenderness  Cardiac: +3/6 holosystolic murmur loudest along LSB; normal S1/S2 and rhythm; no S3/S4, clicks, or rubs; capillary refill <2 sec  Resp: appropriate effort with air entry to lung bases; no increased work of breathing; breath sounds are symmetric; no crackles, wheezes, or rhonchi - lungs clear to auscultation bilaterally   Extremities: +fistula on left upper extremity; no edema or finger clubbing   Derm: no rash, skin lesions, or dryness on face, neck, hands or lower legs  Neuro: symmetric +2/4 biceps and patellar tendon reflexes; no slowing of speech or movements; pt ambulates normally  Psych: pt is appropriately conversational with appropriate affect    In House Labs:   ENDO PITUITARY LABS-[facility] [place] Latest Ref Rng & Units 3/28/2018 7/6/2017   TSH 0.40 - 4.00 mU/L 1.91 2.52   TSH 0.30 - 5.00 uIU/mL     PROLACTIN 3 - 27 ug/L 38 (H)    TSH 0.40 - 4.00 mU/L 1.91 2.52   TSH REFLEX 0.30 - 5.00 uIU/mL     T4 TOTAL 4.5 - 13.9 ug/dL  9.6   T4 FREE 0.76 - 1.46 ng/dL 0.94 1.08       Assessment and Plan:      Mona Wagner is a 27 year old year old woman with a PMHx of ESRD, galactorrhea 2/2 pituitary microadenoma/hypothyroidism who presents for follow-up of hypothyroidism and hyperprolactinemia.       1. Hypothyroidism: Patient has primary hypothyroidism that has been managed with levothyroxine.  She is currently alternating 25 and 37.5 mcg/day as detailed in the HPI above.  TSH is wnl.  Patient is clinically and biochemically euthyroid.     Plan:  1. Repeat TFT today   2. Adjust levothyroxine dose if indicated    2. Hyperprolactinemia: This is likely secondary to a pituitary microadenoma, and his prolactin remained elevated despite proper treatment of her hypothyroidism.  This is a subcentimeter pituitary adenoma (6.7mm x 5.3mm) that has not changed on subsequent imaging. However no pituitary sequence was done.  It is also important to consider that the patient has  end-stage renal disease that can lead to mild elevations in prolactin levels.  It will be interesting to follow prolactin levels once the patient receives a kidney transplant.  -Plan:  1.  We will continue to follow patient's PRL  2. Consider repeating MRI with pituitary sequence  3. Consider cabergoline pending imaging  4. Consult neurosurgery if adenoma grows or pt has symptoms after cabergoline tx (galactorrhea, vision loss, worsening headaches).  Infertility may be difficult to relate to prolactinoma as patient has a severe chronic condition (ESRD)    3. Oligomenorrhea  - ddx: chronic illness (ESRD) vs hypogonadism vs hypothyroidism +/- contraceptive pill effects.  Patient is aware that we can not properly access gonadothropin levels while she is on birth control pills.  Will check PRL.    Test and/or medications prescribed by physician today:  Orders Placed This Encounter   Procedures     TSH     T4 free     I will contact the patient with the test results and see her back in clinic in 6 months or sooner if needed.    Nidhi Coles MD PhD    Division of Endocrinology and Diabetes

## 2018-05-29 NOTE — LETTER
5/29/2018       RE: Mona Wagner  [address]  [place] [address]     Dear Colleague,    Thank you for referring your patient, Mona Wagner, to the [facility] SOLID ORGAN TRANSPLANT at [facility]. Please see a copy of my visit note below.    Dialysis Access Service   Progress Note    S:  Ms. Wagner is being seen today for followup of her dialysis access issues.  She reports no issues with the wound, and  no steal syndrome of the distal extremity. S/P left forearm AV fistula (RC) creation on 1/2/2014, and dialyzes with 2 buttonhole sites in left forearm AV fistula without issues. 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 It was noticed 2.5 weeks ago. It started with a small dot in that area, which is getting larger in last week. Dr. Parra (dialysis nephrologist) is aware of skin pigment changes but no recommendation offered.C/O itching in the area and the lateral aspect of aneurysm.  Denies recent medication, food, soap detergent fistula cleansing agent changed. Jordon pain, swelling, loss of sensation or tingling/numbness in the area.  O:  Temp:  [98  F (36.7  C)] 98  F (36.7  C)  Pulse:  [58-80] 80  Resp:  [18] 18  BP: (141-154)/(94-99) 150/99  SpO2:  [99 %] 99 %  GENERAL: alert  Circulation:   Radial pulse 3+  Ulnar pulse  3+   Capillary refill:  capillary refill < 3 sec    Sensory exam:   arm: Normal   [x]           Abnormal   []          Comment:    hand: Normal   [x]           Abnormal   []          Comment:   Motor exam:   arm: Normal   [x]           Abnormal   []          Comment:    hand: Normal   [x]           Abnormal   []          Comment:    Access: L upper extremity wound(s) healed, non-tender, no venous hypertension. Capillary refill < 2 sec ++ thrill present. No edema without apparent infection.  2 buttonhole sites located above pseudoaneurysm area,  no S/S of infection. 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 Size 0.5 x 0.8 cm skin pigmented area at edge of the interior aspect of aneurysm, non-tender without skin breakdown and neurological changes.      Assessment & Plan: Ms. Headley dialysis access has matured very well at this time point.    1. Continue to monitor skin pigment changes area,  2. May measure size of skin pigmented area  3.  F/U in clinic as needed        We would like to see the patient back in the clinic as needed to assess progress. The patient was counselled to contact our nurse coordinator, ALCIDES Allred CNS (Sum) at [phone] with any questions or concerns.  Thank you for the opportunity to participate in Ms. Wagner's care.    TT: 15 min  CT: 10 min    MIKE Quiroz (Sum)  Dialysis access program   [facility]  Pager # [phone]    Again, thank you for allowing me to participate in the care of your patient.      Sincerely,    ALCIDES Collado

## 2018-05-29 NOTE — PROGRESS NOTES
Dialysis Access Service   Progress Note    S:  Ms. Wagner is being seen today for followup of her dialysis access issues.  She reports no issues with the wound, and  no steal syndrome of the distal extremity. S/P left forearm AV fistula (RC) creation on 1/2/2014, and dialyzes with 2 buttonhole sites in left forearm AV fistula without issues. 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 It was noticed 2.5 weeks ago. It started with a small dot in that area, which is getting larger in last week. Dr. Parra (dialysis nephrologist) is aware of skin pigment changes but no recommendation offered.C/O itching in the area and the lateral aspect of aneurysm.  Denies recent medication, food, soap detergent fistula cleansing agent changed. Jordon pain, swelling, loss of sensation or tingling/numbness in the area.  O:  Temp:  [98  F (36.7  C)] 98  F (36.7  C)  Pulse:  [58-80] 80  Resp:  [18] 18  BP: (141-154)/(94-99) 150/99  SpO2:  [99 %] 99 %  GENERAL: alert  Circulation:   Radial pulse 3+  Ulnar pulse  3+   Capillary refill:  capillary refill < 3 sec    Sensory exam:   arm: Normal   [x]           Abnormal   []          Comment:    hand: Normal   [x]           Abnormal   []          Comment:   Motor exam:   arm: Normal   [x]           Abnormal   []          Comment:    hand: Normal   [x]           Abnormal   []          Comment:    Access: L upper extremity wound(s) healed, non-tender, no venous hypertension. Capillary refill < 2 sec ++ thrill present. No edema without apparent infection.  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 Size 0.5 x 0.8 cm skin hyperpigmented area at edge of the interior aspect of aneurysm, non-tender without skin breakdown and neurological changes.      Assessment &  Plan: Ms. Headley dialysis access has matured very well at this time point.    1. Continue to monitor skin pigment changes area,  2. May measure size of skin pigmented area  3.  F/U in clinic as needed        We would like to see the patient back in the clinic as needed to assess progress. The patient was counselled to contact our nurse coordinator, ALCIDES Allred CNS (Sum) at [phone] with any questions or concerns.  Thank you for the opportunity to participate in Ms. Wagner's care.    TT: 15 min  CT: 10 min    MIKE Quiroz (Sum)  Dialysis access program   [facility]  Pager # [phone]

## 2018-05-29 NOTE — MR AVS SNAPSHOT
After Visit Summary   5/29/2018    Mona Wagner    MRN: [id number]           Patient Information     Date Of Birth          1990        Visit Information        Provider Department      5/29/2018 10:45 AM Jayde Herbert APRN CNS [facility] Solid Organ Transplant        Today's Diagnoses     ESRD on dialysis (H)    -  1    Complication of vascular access for dialysis, initial encounter           Follow-ups after your visit        Your next 10 appointments already scheduled     Aug 23, 2018 10:00 AM CDT   [facility]app [facility]   [facility]    [address], [address]  [facility] [id number] [phone]            Nov 20, 2018  9:30 AM CST   (Arrive by 9:15 AM)   RETURN ENDOCRINE with GEORGIA Coles MD   [facility] Endocrinology ([facility])    [address]  [address]rd [address]  [place] [address] [phone]              Future tests that were ordered for you today     Open Future Orders        Priority Expected Expires Ordered    TSH Routine 11/29/2018 3/29/2019 5/29/2018    T4 free Routine 11/29/2018 3/29/2019 5/29/2018            Who to contact     If you have questions or need follow up information about today's clinic visit or your schedule please contact [facility] SOLID ORGAN TRANSPLANT directly at [phone].  Normal or non-critical lab and imaging results will be communicated to you by MyChart, letter or phone within 4 business days after the clinic has received the results. If you do not hear from us within 7 days, please contact the clinic through MyChart or phone. If you have a critical or abnormal lab result, we will notify you by phone as soon as possible.  Submit refill requests through [place] or call your pharmacy and they will forward the refill request to us. Please allow 3 business days for your refill to be completed.          Additional Information About Your Visit         Whistle Information     Whistle gives you secure access to your electronic health record. If you see a primary care provider, you can also send messages to your care team and make appointments. If you have questions, please call your primary care clinic.  If you do not have a primary care provider, please call [phone] and they will assist you.        Care EveryWhere ID     This is your Care EveryWhere ID. This could be used by other organizations to access your [place] medical records  [id number]        Your Vitals Were     Pulse Temperature Respirations Height Pulse Oximetry BMI (Body Mass Index)    80 98  F (36.7  C) (Oral) 18 1.524 m (5') 99% 22.07 kg/m2       Blood Pressure from Last 3 Encounters:   05/29/18 (!) 150/99   05/29/18 (!) 141/94   02/22/18 (!) 156/98    Weight from Last 3 Encounters:   05/29/18 51.3 kg (113 lb)   05/29/18 51.6 kg (113 lb 12.8 oz)   02/22/18 50.3 kg (111 lb)              Today, you had the following     No orders found for display       Primary Care Provider Office Phone # Fax #    Macarena Jackie Bowen [phone]       [facility] [address] [place] [id number]        Equal Access to Services     RUCHI VILLAGRAN : Hadii aad ku hadasho Soomaali, waaxda luqadaha, qaybta kaalmada adeegyada, waxay idiin hayaan keshia tierney . So [facility] [phone].    ATENCIÓN: Si habla español, tiene a gerber disposición servicios gratuitos de asistencia lingüística. Llame al [phone].    We comply with applicable federal civil rights laws and [place] laws. We do not discriminate on the basis of race, color, national origin, age, disability, sex, sexual orientation, or gender identity.            Thank you!     Thank you for choosing [facility] SOLID ORGAN TRANSPLANT  for your care. Our goal is always to provide you with excellent care. Hearing back from our patients is one way we can continue to improve our services. Please take a few minutes to complete  the written survey that you may receive in the mail after your visit with us. Thank you!             Your Updated Medication List - Protect others around you: Learn how to safely use, store and throw away your medicines at www.disposemymeds.org.          This list is accurate as of 5/29/18  1:50 PM.  Always use your most recent med list.                   Brand Name Dispense Instructions for use Diagnosis    acetaminophen 325 MG tablet    TYLENOL    100 tablet    Take 2 tablets (650 mg) by mouth every 4 hours as needed for mild pain    Back pain, unspecified back location, unspecified back pain laterality, unspecified chronicity       diphenhydrAMINE 25 MG tablet    BENADRYL    60 tablet    Take 1-2 tablets (25-50 mg) by mouth every 6 hours as needed for itching or allergies    Anaphylaxis, subsequent encounter       docusate sodium 100 MG tablet    COLACE    60 tablet    Take 100 mg by mouth daily    Constipation, unspecified constipation type       EMLA cream   Generic drug:  lidocaine-prilocaine     30 g    APPLY TO ACCESS SITE 30 TO 60 MINUTES PRIOR TO        DIALYSIS    ESRD (end stage renal disease) on dialysis (H)       EPINEPHrine 0.3 MG/0.3ML injection 2-pack    EPIPEN/ADRENACLICK/or ANY BX GENERIC EQUIV    0.6 mL    Inject 0.3 mLs (0.3 mg) into the muscle as needed for anaphylaxis    Anaphylaxis, subsequent encounter       levETIRAcetam 250 MG tablet    KEPPRA    175 tablet    Take  1 1/2 tabs daily.  Take an additional 250 mg after dialysis on M, W, F.    Partial symptomatic epilepsy with complex partial seizures, not intractable, without status epilepticus (H)       levothyroxine 25 MCG tablet    SYNTHROID/LEVOTHROID    102 tablet    Take 1 tablet (25 mcg) Tuesday, Thursday, Saturday and Sunday and 1.5 tablets (37.5 mcg) on Monday, Wednesday and Friday every week.    Acquired hypothyroidism       NEPHROCAPS 1 MG capsule     30 capsule    Take 1 capsule (1 mg) by mouth daily    ESRD needing dialysis (H)        norethindrone 0.35 MG per tablet    MICRONOR    28 tablet    Take 1 tablet (0.35 mg) by mouth daily    Surveillance of previously prescribed contraceptive pill       ondansetron 4 MG ODT tab    ZOFRAN ODT    60 tablet    Take 1-2 tablets (4-8 mg) by mouth every 8 hours as needed for nausea    Nausea       ranitidine 150 MG tablet    ZANTAC    60 tablet    Take 1 tablet (150 mg) by mouth daily (with breakfast)    Gastroesophageal reflux disease without esophagitis       SENSIPAR 30 MG tablet   Generic drug:  cinacalcet      Take 60 mg by mouth daily        sevelamer 800 MG tablet    RENVELA     Take 800 mg by mouth 3 times daily (with meals) Patient takes 3-4 tablets 3 times a day and 1 with snack prn        simethicone 125 MG Chew chewable tablet    MYLICON     Take 125 mg by mouth 2 times daily        TUMS CHEWY BITES 750 MG Chew   Generic drug:  calcium carbonate      Take 750 mg by mouth 3 times daily        VITAMIN D (CHOLECALCIFEROL) PO      Take 1,000 Units by mouth daily

## 2018-06-08 DIAGNOSIS — N18.6 END STAGE RENAL DISEASE (H): ICD-10-CM

## 2018-06-08 DIAGNOSIS — Z76.82 ORGAN TRANSPLANT CANDIDATE: Primary | ICD-10-CM

## 2018-06-12 DIAGNOSIS — G40.209 PARTIAL SYMPTOMATIC EPILEPSY WITH COMPLEX PARTIAL SEIZURES, NOT INTRACTABLE, WITHOUT STATUS EPILEPTICUS (H): ICD-10-CM

## 2018-06-13 ENCOUNTER — RESULTS ONLY (OUTPATIENT)
Dept: OTHER | Facility: CLINIC | Age: 28
End: 2018-06-13

## 2018-06-13 DIAGNOSIS — N18.6 END STAGE RENAL DISEASE (H): ICD-10-CM

## 2018-06-13 DIAGNOSIS — Z76.82 ORGAN TRANSPLANT CANDIDATE: ICD-10-CM

## 2018-06-13 RX ORDER — LEVETIRACETAM 250 MG/1
TABLET ORAL
Qty: 175 TABLET | Refills: 0 | Status: CANCELLED | OUTPATIENT
Start: 2018-06-13

## 2018-06-19 DIAGNOSIS — G40.209 PARTIAL SYMPTOMATIC EPILEPSY WITH COMPLEX PARTIAL SEIZURES, NOT INTRACTABLE, WITHOUT STATUS EPILEPTICUS (H): Primary | ICD-10-CM

## 2018-06-19 LAB — PRA SINGLE ANTIGEN IGG ANTIBODY: NORMAL

## 2018-06-19 RX ORDER — LEVETIRACETAM 250 MG/1
TABLET ORAL
Qty: 175 TABLET | Refills: 0 | Status: SHIPPED | OUTPATIENT
Start: 2018-06-19 | End: 2018-09-06

## 2018-07-18 ENCOUNTER — TELEPHONE (OUTPATIENT)
Dept: TRANSPLANT | Facility: CLINIC | Age: 28
End: 2018-07-18

## 2018-07-20 DIAGNOSIS — G40.209 PARTIAL SYMPTOMATIC EPILEPSY WITH COMPLEX PARTIAL SEIZURES, NOT INTRACTABLE, WITHOUT STATUS EPILEPTICUS (H): ICD-10-CM

## 2018-07-20 RX ORDER — LEVETIRACETAM 250 MG/1
TABLET ORAL
Qty: 60 TABLET | Refills: 0 | OUTPATIENT
Start: 2018-07-20

## 2018-07-24 ENCOUNTER — OFFICE VISIT (OUTPATIENT)
Dept: NEPHROLOGY | Facility: CLINIC | Age: 28
End: 2018-07-24
Attending: PHYSICIAN ASSISTANT
Payer: MEDICARE

## 2018-07-24 ENCOUNTER — ALLIED HEALTH/NURSE VISIT (OUTPATIENT)
Dept: TRANSPLANT | Facility: CLINIC | Age: 28
End: 2018-07-24
Attending: SURGERY
Payer: MEDICARE

## 2018-07-24 ENCOUNTER — ALLIED HEALTH/NURSE VISIT (OUTPATIENT)
Dept: TRANSPLANT | Facility: CLINIC | Age: 28
End: 2018-07-24
Attending: PHYSICIAN ASSISTANT
Payer: MEDICARE

## 2018-07-24 ENCOUNTER — OFFICE VISIT (OUTPATIENT)
Dept: TRANSPLANT | Facility: CLINIC | Age: 28
End: 2018-07-24
Attending: SURGERY
Payer: MEDICARE

## 2018-07-24 VITALS
DIASTOLIC BLOOD PRESSURE: 76 MMHG | HEART RATE: 96 BPM | RESPIRATION RATE: 18 BRPM | SYSTOLIC BLOOD PRESSURE: 114 MMHG | TEMPERATURE: 98.5 F | WEIGHT: 115 LBS | HEIGHT: 60 IN | OXYGEN SATURATION: 100 % | BODY MASS INDEX: 22.58 KG/M2

## 2018-07-24 VITALS
SYSTOLIC BLOOD PRESSURE: 112 MMHG | HEIGHT: 60 IN | BODY MASS INDEX: 22.58 KG/M2 | DIASTOLIC BLOOD PRESSURE: 75 MMHG | WEIGHT: 115 LBS | RESPIRATION RATE: 16 BRPM | HEART RATE: 86 BPM

## 2018-07-24 DIAGNOSIS — N18.6 ESRD (END STAGE RENAL DISEASE) ON DIALYSIS (H): Primary | ICD-10-CM

## 2018-07-24 DIAGNOSIS — Z99.2 ESRD (END STAGE RENAL DISEASE) ON DIALYSIS (H): Primary | ICD-10-CM

## 2018-07-24 DIAGNOSIS — Z76.82 ORGAN TRANSPLANT CANDIDATE: Primary | ICD-10-CM

## 2018-07-24 DIAGNOSIS — N18.6 ESRD ON DIALYSIS (H): ICD-10-CM

## 2018-07-24 DIAGNOSIS — N18.6 END STAGE RENAL DISEASE (H): ICD-10-CM

## 2018-07-24 DIAGNOSIS — T82.9XXA COMPLICATION OF VASCULAR ACCESS FOR DIALYSIS, INITIAL ENCOUNTER: ICD-10-CM

## 2018-07-24 DIAGNOSIS — Z76.82 AWAITING ORGAN TRANSPLANT: Primary | ICD-10-CM

## 2018-07-24 DIAGNOSIS — Z76.82 ORGAN TRANSPLANT CANDIDATE: ICD-10-CM

## 2018-07-24 DIAGNOSIS — Z99.2 ESRD ON DIALYSIS (H): ICD-10-CM

## 2018-07-24 LAB
ABO + RH BLD: NORMAL
ABO + RH BLD: NORMAL
BLD GP AB SCN SERPL QL: NORMAL
BLOOD BANK CMNT PATIENT-IMP: NORMAL
SPECIMEN EXP DATE BLD: NORMAL

## 2018-07-24 PROCEDURE — 86850 RBC ANTIBODY SCREEN: CPT | Performed by: INTERNAL MEDICINE

## 2018-07-24 PROCEDURE — 86901 BLOOD TYPING SEROLOGIC RH(D): CPT | Performed by: INTERNAL MEDICINE

## 2018-07-24 PROCEDURE — 86900 BLOOD TYPING SEROLOGIC ABO: CPT | Performed by: INTERNAL MEDICINE

## 2018-07-24 PROCEDURE — 86886 COOMBS TEST INDIRECT TITER: CPT | Performed by: INTERNAL MEDICINE

## 2018-07-24 PROCEDURE — 36415 COLL VENOUS BLD VENIPUNCTURE: CPT | Performed by: INTERNAL MEDICINE

## 2018-07-24 PROCEDURE — G0463 HOSPITAL OUTPT CLINIC VISIT: HCPCS | Mod: ZF

## 2018-07-24 PROCEDURE — G0463 HOSPITAL OUTPT CLINIC VISIT: HCPCS | Mod: 27

## 2018-07-24 ASSESSMENT — PAIN SCALES - GENERAL
PAINLEVEL: NO PAIN (0)
PAINLEVEL: NO PAIN (0)

## 2018-07-24 NOTE — MR AVS SNAPSHOT
After Visit Summary   7/24/2018    Mona Wagner    MRN: [id number]           Patient Information     Date Of Birth          1990        Visit Information        Provider Department      7/24/2018 3:00 PM Joaquina Alanis MSW [facility] Solid Organ Transplant        Today's Diagnoses     Awaiting organ transplant    -  1       Follow-ups after your visit        Your next 10 appointments already scheduled     Aug 23, 2018 10:00 AM CDT   Return [facility] Theresa [facility]app MD   [facility]    [address], [address]  [facility] [id number] [phone]            Nov 20, 2018  9:30 AM CST   (Arrive by 9:15 AM)   RETURN ENDOCRINE with GEORGIA Coles MD   [facility] Endocrinology ([facility] [place])    [address]  3rd Floor  [facility] [address] [phone]              Who to contact     If you have questions or need follow up information about today's clinic visit or your schedule please contact [facility] SOLID ORGAN TRANSPLANT directly at [phone].  Normal or non-critical lab and imaging results will be communicated to you by Picfairhart, letter or phone within 4 business days after the clinic has received the results. If you do not hear from us within 7 days, please contact the clinic through SEAt or phone. If you have a critical or abnormal lab result, we will notify you by phone as soon as possible.  Submit refill requests through OurStage or call your pharmacy and they will forward the refill request to us. Please allow 3 business days for your refill to be completed.          Additional Information About Your Visit        MyChart Information     OurStage gives you secure access to your electronic health record. If you see a primary care provider, you can also send messages to your care team and make appointments. If you have questions, please call your primary care clinic.  If you do not  have a primary care provider, please call [phone] and they will assist you.        Care EveryWhere ID     This is your Care EveryWhere ID. This could be used by other organizations to access your [place] medical records  [id number]         Blood Pressure from Last 3 Encounters:   07/24/18 112/75   07/24/18 114/76   05/29/18 (!) 150/99    Weight from Last 3 Encounters:   07/24/18 52.2 kg (115 lb)   07/24/18 52.2 kg (115 lb)   05/29/18 51.3 kg (113 lb)              Today, you had the following     No orders found for display         Today's Medication Changes          These changes are accurate as of 7/24/18 11:59 PM.  If you have any questions, ask your nurse or doctor.               Stop taking these medicines if you haven't already. Please contact your care team if you have questions.     ranitidine 150 MG tablet   Commonly known as:  ZANTAC   Stopped by:  Shashi Casrto MD                    Primary Care Provider Office Phone # Fax #    Macarena Jackie Bowen [phone]       [facility] [address] [place] [address]        Equal Access to Services     [facility]: Hadii aad ku hadasho Soomaali, waaxda luqadaha, qaybta kaalmada adeegyada, quynh cool. So [facility] [phone].    ATENCIÓN: Si habla español, tiene a gerber disposición servicios gratuitos de asistencia lingüística. [facility] [phone].    We comply with applicable federal civil rights laws and [place] laws. We do not discriminate on the basis of race, color, national origin, age, disability, sex, sexual orientation, or gender identity.            Thank you!     Thank you for choosing [facility] SOLID ORGAN TRANSPLANT  for your care. Our goal is always to provide you with excellent care. Hearing back from our patients is one way we can continue to improve our services. Please take a few minutes to complete the written survey that you may receive in the mail after your visit with us.  Thank you!             Your Updated Medication List - Protect others around you: Learn how to safely use, store and throw away your medicines at www.disposemymeds.org.          This list is accurate as of 7/24/18 11:59 PM.  Always use your most recent med list.                   Brand Name Dispense Instructions for use Diagnosis    acetaminophen 325 MG tablet    TYLENOL    100 tablet    Take 2 tablets (650 mg) by mouth every 4 hours as needed for mild pain    Back pain, unspecified back location, unspecified back pain laterality, unspecified chronicity       diphenhydrAMINE 25 MG tablet    BENADRYL    60 tablet    Take 1-2 tablets (25-50 mg) by mouth every 6 hours as needed for itching or allergies    Anaphylaxis, subsequent encounter       docusate sodium 100 MG tablet    COLACE    60 tablet    Take 100 mg by mouth daily    Constipation, unspecified constipation type       EMLA cream   Generic drug:  lidocaine-prilocaine     30 g    APPLY TO ACCESS SITE 30 TO 60 MINUTES PRIOR TO        DIALYSIS    ESRD (end stage renal disease) on dialysis (H)       EPINEPHrine 0.3 MG/0.3ML injection 2-pack    EPIPEN/ADRENACLICK/or ANY BX GENERIC EQUIV    0.6 mL    Inject 0.3 mLs (0.3 mg) into the muscle as needed for anaphylaxis    Anaphylaxis, subsequent encounter       levETIRAcetam 250 MG tablet    KEPPRA    175 tablet    Take  1 1/2 tabs daily.  Take an additional 250 mg after dialysis on M, W, F.    Partial symptomatic epilepsy with complex partial seizures, not intractable, without status epilepticus (H)       levothyroxine 25 MCG tablet    SYNTHROID/LEVOTHROID    102 tablet    Take 1 tablet (25 mcg) Tuesday, Thursday, Saturday and Sunday and 1.5 tablets (37.5 mcg) on Monday, Wednesday and Friday every week.    Acquired hypothyroidism       NEPHROCAPS 1 MG capsule     30 capsule    Take 1 capsule (1 mg) by mouth daily    ESRD needing dialysis (H)       norethindrone 0.35 MG per tablet    MICRONOR    28 tablet    Take 1  tablet (0.35 mg) by mouth daily    Surveillance of previously prescribed contraceptive pill       ondansetron 4 MG ODT tab    ZOFRAN ODT    60 tablet    Take 1-2 tablets (4-8 mg) by mouth every 8 hours as needed for nausea    Nausea       SENSIPAR 30 MG tablet   Generic drug:  cinacalcet      Take 60 mg by mouth daily        sevelamer 800 MG tablet    RENVELA     Take 800 mg by mouth 3 times daily (with meals) Patient takes 3-4 tablets 3 times a day and 1 with snack prn        simethicone 125 MG Chew chewable tablet    MYLICON     Take 125 mg by mouth 2 times daily        TUMS CHEWY BITES 750 MG Chew   Generic drug:  calcium carbonate      Take 750 mg by mouth 3 times daily        VITAMIN D (CHOLECALCIFEROL) PO      Take 1,000 Units by mouth daily

## 2018-07-24 NOTE — LETTER
2018       RE: Mona Wagner  [address]  [place] [address]     Dear Colleague,    Thank you for referring your patient, Mona Wagner, to the [facility] NEPHROLOGY at [facility]. Please see a copy of my visit note below.    Assessment and Plan:  1. Kidney transplant wait list evaluation - Ms. Wagner is an excellent candidate overall. She should remain active on the wait list.  2. ESKD from unknown etiology - doing well on hemodialysis since 2013, but would benefit from a kidney transplant.   3. Cardiac risk - she has no known history of cardiac disease or events and is asymptomatic with Assessment and Plan:  1. Kidney transplant wait list evaluation - Ms. Wagner is an excellent candidate overall. She should remain active on the wait list.  2. ESKD from unknown etiology - doing well on hemodialysis since 2013, but would benefit from a kidney transplant.   3. Cardiac risk - she has no known history of cardiac disease or events and is asymptomatic with exertion. May 2017 ECHO with EF 60-65%, no valvular abnormalities, and trace pericardial effusion unchanged from 2016.  4. Chronic ITP - evaluated by hematology in 2016 with no treatment indicated at that time. Will obtain dialysis labs to review last 6 months of CBCs.  5. Localization-related epilepsy - she is maintained on Keppra, followed by neurology, and has not had recurrence of seizure.  6. Hyperprolactinemia - she continues to follow with endocrinology.  7. Health maintenance - pap smear up to date.     Discussed the risks and benefits of a transplant, including the risk of surgery and immunosuppression medications.    KDPI: We discussed approximate remaining wait time and how that is influenced by issues such as blood type and sensitization (PRA) and access to a living donor. I contrasted potential waiting time for living vs  donor kidneys from  normal (0-85%) or higher (%)  kidney donor profile index (KDPI) donors and their associated outcomes. I would not recommend Ms. Wagner to consider kidneys from high KDPI donors. The reason for this decision is best summarized as: improved long term graft survival.    Patient s overall re-evaluation may require further discussion in the Transplant Program s multidisciplinary selection committee for a final recommendation on the patient s suitability for transplant.     Reason for Visit:  Mona Wagner is a 27-year-old female with ESKD from unknown etiology who presents for kidney transplant wait list evaluation.     Last Evaluation Clinic Visit Date:  August 2016        Wait List Date: 12/13/2013  Current phase/status: Waitlist: Active as of 1/18/2016  Transplant coordinator: Soumya Sylvester Transplant Office phone number [phone]     Previous Medical Issues:  #Chest pressure with UF on dialysis   #Hyperprolactinemia secondary to pituitary microadenoma      HPI: Ms. Wagner is a 27-year-old female with ESKD from unknown etiology on dialysis since December 2013, SAH and seizure, hypothyroid, and hyperprolactinemia secondary to pituitary microadenoma.         Interim events/issues         She had hematology evaluation in December 2016 for intermittent thrombocytopenia since 2014 and thought to likely have chronic ITP. She believes her platelets have been around 150k most recently.    Dialysis is going well. She is currently dialyzing via a left wrist AVF that she says is working well. She is anuric.    She has not had any seizure recurrence since her first episode in September 2014. Keppra dose was decreased in December 2016. 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 She continues to follow with endocrinology for hyperprolactinemia and has not started cabergoline.     Overall, she is feeling well. Her energy level and appetite have remained stable. 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 She denies chest pain, SOB, or claudication symptoms with exertion. She currently denies nausea, vomiting, or diarrhea. No fevers, sweats, chills, or recent illness. No recent hospitalizations or blood transfusions.          Kidney Disease Hx       Kidney Disease Dx: unknown etiology       Biopsy Proven: No        On Dialysis: Yes, Date initiated: December 2013 and Dialysis Type: [facility] HD;       Primary Nephrologist: Dr. Parra          Uremic Symptoms: Fatigue: No; Cold: No; Nausea: No; Poor Appetite: No; Metallic Taste: No; Edema: No;          Potential Donor(s): Yes          Cardiac history:       Last cardiac risk assessment: not required       Last ECHO May 2017. She has not had a stress test or coronary angiogram       Exertional symptoms: none       Recent cardiac events: none     Pertinent issues:   Blood transfusion: Yes  Jehovah s Witness: No  Pregnancies: No  Previous transplant: No  Urine output: No  Bladder dysfunction: No  Claudication: No  Previous amputation: No  Cancer: No  Recurrent infection: No  Chronic anticoagulation: No      ROS:  A comprehensive review of systems was obtained and negative, except as noted in the HPI or PMH.     PMH:  Medical records were obtained and reviewed.  Past Medical History:   Diagnosis Date     Anemia in chronic kidney disease      Dialysis patient (H)      End stage renal disease on dialysis (H)      Hypothyroid      Pituitary adenoma (H)         PSH:  Past Surgical History:   Procedure Laterality Date     CREATE FISTULA ARTERIOVENOUS UPPER EXTREMITY  1/2/2014    Procedure: CREATE FISTULA ARTERIOVENOUS UPPER EXTREMITY;  Left Wrist Arteriovenous Fistula Placement;  Surgeon: Shashi Castro MD;  Location:  OR     [facility]/DIALYSIS CATHETER  12/10/2013             Personal Hx:  Social History     Social History     Marital status:      Spouse name: N/A     Number of children: N/A      Years of education: N/A     Occupational History     Not on file.     Social History Main Topics     Smoking status: Never Smoker     Smokeless tobacco: Never Used     Alcohol use No     Drug use: No     Sexual activity: Yes     Partners: Male     Other Topics Concern     Not on file     Social History Narrative    Date of Service:5/15/2013     Name: Mona VALDOVINOS (Month, Day, Year of birth): 1990     MRN: [id number]     New Patient: Yes    Preferred Language: English     Needed: No    County of Residence: [place]    Marital Status:     Household size: 8    Number of Dependents:0     Pregnant: 0    Average Monthly Income: $ 600    Citizenship Status: Citizen    Gave Pt [employer] and [place] applications        Allergies:  Allergies   Allergen Reactions     Chlorhexidine Rash     Rash at site     Sulfa Drugs Rash     Muscle stiffness of neck     Lisinopril Swelling     angioedema     Alcohol Swabs [Isopropyl Alcohol] Rash        Medications:  Prior to Admission medications    Medication Sig Start Date End Date Taking? Authorizing Provider   acetaminophen (TYLENOL) 325 MG tablet Take 2 tablets (650 mg) by mouth every 4 hours as needed for mild pain 18   Joseluis Freire MD   B complex-C-folic acid (NEPHROCAPS) 1 MG capsule Take 1 capsule (1 mg) by mouth daily 13   Conner Castañeda MD   calcium carbonate (TUMS CHEWY BITES) 750 MG CHEW Take 750 mg by mouth 3 times daily    Reported, Patient   cinacalcet (SENSIPAR) 30 MG tablet Take 60 mg by mouth daily     Reported, Patient   diphenhydrAMINE (BENADRYL) 25 MG tablet Take 1-2 tablets (25-50 mg) by mouth every 6 hours as needed for itching or allergies 18   Joseluis Freire MD   docusate sodium (COLACE) 100 MG tablet Take 100 mg by mouth daily 18   Joseluis Freire MD   EMLA cream APPLY TO ACCESS SITE 30 TO 60 MINUTES PRIOR TO        DIALYSIS 17   Delicia Parra MD   EPINEPHrine (EPIPEN/ADRENACLICK/OR ANY  BX GENERIC EQUIV) 0.3 MG/0.3ML injection 2-pack Inject 0.3 mLs (0.3 mg) into the muscle as needed for anaphylaxis 1/17/18   Joseluis Freire MD   levETIRAcetam (KEPPRA) 250 MG tablet Take  1 1/2 tabs daily.  Take an additional 250 mg after dialysis on M, W, F. 6/19/18   Theresa Sapp MD   levothyroxine (SYNTHROID/LEVOTHROID) 25 MCG tablet Take 1 tablet (25 mcg) Tuesday, Thursday, Saturday and Sunday and 1.5 tablets (37.5 mcg) on Monday, Wednesday and Friday every week. 4/10/18   GEORGIA Coles MD   norethindrone (MICRONOR) 0.35 MG per tablet Take 1 tablet (0.35 mg) by mouth daily 5/25/18   Macarena Bowen MD   ondansetron (ZOFRAN ODT) 4 MG ODT tab Take 1-2 tablets (4-8 mg) by mouth every 8 hours as needed for nausea 4/12/17   Macarena Bowen MD   sevelamer (RENVELA) 800 MG tablet Take 800 mg by mouth 3 times daily (with meals) Patient takes 3-4 tablets 3 times a day and 1 with snack prn    Reported, Patient   simethicone (MYLICON) 125 MG CHEW chewable tablet Take 125 mg by mouth 2 times daily    Reported, Patient   VITAMIN D, CHOLECALCIFEROL, PO Take 1,000 Units by mouth daily    Reported, Patient        Vitals:  /75  Pulse 86  Resp 16  Ht 1.524 m (5')  Wt 52.2 kg (115 lb)  Breastfeeding? No  BMI 22.46 kg/m2     Exam:  GENERAL APPEARANCE: alert and no distress  HENT: mouth without ulcers or lesions. Good dentition  LYMPHATICS: no cervical or supraclavicular nodes  RESP: lungs clear to auscultation - no rales, rhonchi or wheezes  CV: regular rhythm, normal rate, no rub, no murmur  EDEMA: no LE edema bilaterally  ABDOMEN: soft, nondistended, nontender  MS: extremities normal - no gross deformities noted, no evidence of inflammation in joints, no muscle tenderness  SKIN: no rash  Femoral pulses 2+ equal bilterally       Again, thank you for allowing me to participate in the care of your patient.      Sincerely,    Lorin Hutchins PA-C

## 2018-07-24 NOTE — Clinical Note
2018      RE: Mona Wagner  [address]  [place] [address]       Assessment and Plan:  1. Kidney transplant wait list evaluation - Ms. Wagner is an excellent candidate overall. She should remain active on the wait list.  2. ESKD from unknown etiology - doing well on hemodialysis since 2013, but would benefit from a kidney transplant.   3. Cardiac risk - she has no known history of cardiac disease or events and is asymptomatic with exertion. May 2017 ECHO with EF 60-65%, no valvular abnormalities, and trace pericardial effusion unchanged from 2016.  4. Chronic ITP - evaluated by hematology in 2016 with no treatment indicated at that time. Will obtain dialysis labs to review last 6 months of CBCs.  5. Localization-related epilepsy - she is maintained on Keppra, followed by neurology, and has not had recurrence of seizure.  6. Hyperprolactinemia - she continues to follow with endocrinology.  7. Health maintenance - pap smear up to date.     Discussed the risks and benefits of a transplant, including the risk of surgery and immunosuppression medications.    KDPI: We discussed approximate remaining wait time and how that is influenced by issues such as blood type and sensitization (PRA) and access to a living donor. I contrasted potential waiting time for living vs  donor kidneys from  normal (0-85%) or higher (%) kidney donor profile index (KDPI) donors and their associated outcomes. I would not recommend Ms. Wagner to consider kidneys from high KDPI donors. The reason for this decision is best summarized as: improved long term graft survival.    Patient s overall re-evaluation may require further discussion in the Transplant Program s multidisciplinary selection committee for a final recommendation on the patient s suitability for transplant.     Reason for Visit:  Mona Wagner is a 27-year-old female with ESKD from unknown etiology who presents for kidney transplant wait list  evaluation.     Last Evaluation Clinic Visit Date:  August 2016        Wait List Date: 12/13/2013  Current phase/status: Waitlist: Active as of 1/18/2016  Transplant coordinator: Soumya Sylvester Transplant Office phone number [phone]     Previous Medical Issues:  #Chest pressure with UF on dialysis   #Hyperprolactinemia secondary to pituitary microadenoma      HPI: Ms. Wagner is a 27-year-old female with ESKD from unknown etiology on dialysis since December 2013, SAH and seizure, hypothyroid, hyperprolactinemia secondary to pituitary microadenoma,         Interim events/issues         She had hematology evaluation in December 2016 for intermittent thrombocytopenia since 2014 and thought to likely have chronic ITP. She believes her platelets have been around 150k most recently.    Dialysis is going well. She is currently dialyzing via a left wrist AVF that she says is working well. She is anuric.    She has not had any seizure recurrence since her first episode in September 2014. Keppra dose was decreased in December 2016. 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 She continues to follow with endocrinology for hyperprolactinemia and has not started cabergoline.     Overall, she is feeling well. Her energy level and appetite have remained stable. 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 She denies chest pain, SOB, or claudication symptoms with exertion. She currently denies nausea, vomiting, or diarrhea. No fevers, sweats, chills, or recent illness. No recent hospitalizations or blood transfusions.          Kidney Disease Hx       Kidney Disease Dx: unknown etiology       Biopsy Proven: No        On Dialysis: Yes, Date initiated: December 2013 and Dialysis Type: [facility];       Primary Nephrologist: Dr. Parra          Uremic Symptoms: Fatigue: No; Cold: No; Nausea: No;  Poor Appetite: No; Metallic Taste: No; Edema: No;          Potential Donor(s): Yes          Cardiac history:       Last cardiac risk assessment: not required       Last ECHO May 2017. She has not had a stress test or coronary angiogram       Exertional symptoms: none       Recent cardiac events: none     Pertinent issues:   Blood transfusion: Yes  Jehovah s Witness: No  Pregnancies: No  Previous transplant: No  Urine output: No  Bladder dysfunction: No  Claudication: No  Previous amputation: No  Cancer: No  Recurrent infection: No  Chronic anticoagulation: No      ROS:  A comprehensive review of systems was obtained and negative, except as noted in the HPI or PMH.     PMH:  Medical records were obtained and reviewed.  Past Medical History:   Diagnosis Date     Anemia in chronic kidney disease      Dialysis patient (H)      End stage renal disease on dialysis (H)      Hypothyroid      Pituitary adenoma (H)         PSH:  Past Surgical History:   Procedure Laterality Date     CREATE FISTULA ARTERIOVENOUS UPPER EXTREMITY  2014    Procedure: CREATE FISTULA ARTERIOVENOUS UPPER EXTREMITY;  Left Wrist Arteriovenous Fistula Placement;  Surgeon: Shashi Castro MD;  Location: [facility] OR     RASTA/DIALYSIS CATHETER  12/10/2013             Personal Hx:  Social History     Social History     Marital status:      Spouse name: N/A     Number of children: N/A     Years of education: N/A     Occupational History     Not on file.     Social History Main Topics     Smoking status: Never Smoker     Smokeless tobacco: Never Used     Alcohol use No     Drug use: No     Sexual activity: Yes     Partners: Male     Other Topics Concern     Not on file     Social History Narrative    Date of Service:5/15/2013     Name: Mona VALDOVINOS (Month, Day, Year of birth): 1990     MRN: [id number]     New Patient: Yes    Preferred Language: English     Needed: No    County of Residence: [place]    Marital Status:      Household size: 8    Number of Dependents:0     Pregnant: 0    Average Monthly Income: $ 600    Citizenship Status: Citizen    Gave Pt [employer] and [employer] applications        Allergies:  Allergies   Allergen Reactions     Chlorhexidine Rash     Rash at site     Sulfa Drugs Rash     Muscle stiffness of neck     Lisinopril Swelling     angioedema     Alcohol Swabs [Isopropyl Alcohol] Rash        Medications:  Prior to Admission medications    Medication Sig Start Date End Date Taking? Authorizing Provider   acetaminophen (TYLENOL) 325 MG tablet Take 2 tablets (650 mg) by mouth every 4 hours as needed for mild pain 1/17/18   Joseluis Freire MD   B complex-C-folic acid (NEPHROCAPS) 1 MG capsule Take 1 capsule (1 mg) by mouth daily 12/13/13   Conner Castañeda MD   calcium carbonate (TUMS CHEWY BITES) 750 MG CHEW Take 750 mg by mouth 3 times daily    Reported, Patient   cinacalcet (SENSIPAR) 30 MG tablet Take 60 mg by mouth daily     Reported, Patient   diphenhydrAMINE (BENADRYL) 25 MG tablet Take 1-2 tablets (25-50 mg) by mouth every 6 hours as needed for itching or allergies 1/17/18   Joseluis Freire MD   docusate sodium (COLACE) 100 MG tablet Take 100 mg by mouth daily 1/17/18   Joseluis Freire MD   EMLA cream APPLY TO ACCESS SITE 30 TO 60 MINUTES PRIOR TO        DIALYSIS 7/5/17   Delicia Parra MD   EPINEPHrine (EPIPEN/ADRENACLICK/OR ANY BX GENERIC EQUIV) 0.3 MG/0.3ML injection 2-pack Inject 0.3 mLs (0.3 mg) into the muscle as needed for anaphylaxis 1/17/18   Joseluis Freire MD   levETIRAcetam (KEPPRA) 250 MG tablet Take  1 1/2 tabs daily.  Take an additional 250 mg after dialysis on M, W, F. 6/19/18   Theresa Sapp MD   levothyroxine (SYNTHROID/LEVOTHROID) 25 MCG tablet Take 1 tablet (25 mcg) Tuesday, Thursday, Saturday and Sunday and 1.5 tablets (37.5 mcg) on Monday, Wednesday and Friday every week. 4/10/18   GEORGIA Coles MD   norethindrone (MICRONOR) 0.35 MG per tablet  Take 1 tablet (0.35 mg) by mouth daily 5/25/18   Macarena Bowen MD   ondansetron (ZOFRAN ODT) 4 MG ODT tab Take 1-2 tablets (4-8 mg) by mouth every 8 hours as needed for nausea 4/12/17   Macarena Bowen MD   sevelamer (RENVELA) 800 MG tablet Take 800 mg by mouth 3 times daily (with meals) Patient takes 3-4 tablets 3 times a day and 1 with snack prn    Reported, Patient   simethicone (MYLICON) 125 MG CHEW chewable tablet Take 125 mg by mouth 2 times daily    Reported, Patient   VITAMIN D, CHOLECALCIFEROL, PO Take 1,000 Units by mouth daily    Reported, Patient        Vitals:  /75  Pulse 86  Resp 16  Ht 1.524 m (5')  Wt 52.2 kg (115 lb)  Breastfeeding? No  BMI 22.46 kg/m2     Exam:  GENERAL APPEARANCE: alert and no distress  HENT: mouth without ulcers or lesions. Good dentition  LYMPHATICS: no cervical or supraclavicular nodes  RESP: lungs clear to auscultation - no rales, rhonchi or wheezes  CV: regular rhythm, normal rate, no rub, no murmur  EDEMA: no LE edema bilaterally  ABDOMEN: soft, nondistended, nontender  MS: extremities normal - no gross deformities noted, no evidence of inflammation in joints, no muscle tenderness  SKIN: no rash  Femoral pulses 2+ equal bilterally         Lorin Hutchins PA-C

## 2018-07-24 NOTE — MR AVS SNAPSHOT
After Visit Summary   7/24/2018    Mona Wagner    MRN: [id number]           Patient Information     Date Of Birth          1990        Visit Information        Provider Department      7/24/2018 9:20 AM Shashi Castro MD [facility] Solid Organ Transplant        Today's Diagnoses     Organ transplant candidate    -  1    ESRD on dialysis (H)        Complication of vascular access for dialysis, initial encounter           Follow-ups after your visit        Your next 10 appointments already scheduled     Aug 23, 2018 10:00 AM CDT   Return Visit with Theresa Sapp MD   [facility]    [address], [address]  [place] [id number] [phone]            Nov 20, 2018  9:30 AM CST   (Arrive by 9:15 AM)   RETURN ENDOCRINE with GEORGIA Coles MD   [facility] ([facility])    [address]  3[address]  [place] [address] [phone]              Who to contact     If you have questions or need follow up information about today's clinic visit or your schedule please contact [facility]ANSPL[facility] directly at [phone].  Normal or non-critical lab and imaging results will be communicated to you by Ram Powerhart, letter or phone within 4 business days after the clinic has received the results. If you do not hear from us within 7 days, please contact the clinic through Ram Powerhart or phone. If you have a critical or abnormal lab result, we will notify you by phone as soon as possible.  Submit refill requests through Sakti3 or call your pharmacy and they will forward the refill request to us. Please allow 3 business days for your refill to be completed.          Additional Information About Your Visit        MyChart Information     Sakti3 gives you secure access to your electronic health record. If you see a primary care provider, you can also send messages to your care team and make  appointments. If you have questions, please call your primary care clinic.  If you do not have a primary care provider, please call [phone] and they will assist you.        Care EveryWhere ID     This is your Care EveryWhere ID. This could be used by other organizations to access your [place] medical records  [id number]        Your Vitals Were     Pulse Temperature Respirations Height Pulse Oximetry BMI (Body Mass Index)    96 98.5  F (36.9  C) (Oral) 18 1.524 m (5') 100% 22.46 kg/m2       Blood Pressure from Last 3 Encounters:   07/24/18 112/75   07/24/18 114/76   05/29/18 (!) 150/99    Weight from Last 3 Encounters:   07/24/18 52.2 kg (115 lb)   07/24/18 52.2 kg (115 lb)   05/29/18 51.3 kg (113 lb)              Today, you had the following     No orders found for display         Today's Medication Changes          These changes are accurate as of 7/24/18 11:59 PM.  If you have any questions, ask your nurse or doctor.               Stop taking these medicines if you haven't already. Please contact your care team if you have questions.     ranitidine 150 MG tablet   Commonly known as:  ZANTAC   Stopped by:  Shashi Castro MD                    Primary Care Provider Office Phone # Fax #    Macarena Jackie Bowen [phone]       [facility] [address] [place] [address]        Equal Access to Services     [facility] AH: Hadii aad ku hadasho Solina, waaxda luqadaha, qaybta kaalmada keshiayasusie, quynh cool. So [facility] [phone].    ATENCIÓN: Si habla español, tiene a gerber disposición servicios gratuitos de asistencia lingüística. Llame al [phone].    We comply with applicable federal civil rights laws and [place] laws. We do not discriminate on the basis of race, color, national origin, age, disability, sex, sexual orientation, or gender identity.            Thank you!     Thank you for choosing [facility]GAN TRANSPL[facility]  for your  care. Our goal is always to provide you with excellent care. Hearing back from our patients is one way we can continue to improve our services. Please take a few minutes to complete the written survey that you may receive in the mail after your visit with us. Thank you!             Your Updated Medication List - Protect others around you: Learn how to safely use, store and throw away your medicines at www.disposemymeds.org.          This list is accurate as of 7/24/18 11:59 PM.  Always use your most recent med list.                   Brand Name Dispense Instructions for use Diagnosis    acetaminophen 325 MG tablet    TYLENOL    100 tablet    Take 2 tablets (650 mg) by mouth every 4 hours as needed for mild pain    Back pain, unspecified back location, unspecified back pain laterality, unspecified chronicity       diphenhydrAMINE 25 MG tablet    BENADRYL    60 tablet    Take 1-2 tablets (25-50 mg) by mouth every 6 hours as needed for itching or allergies    Anaphylaxis, subsequent encounter       docusate sodium 100 MG tablet    COLACE    60 tablet    Take 100 mg by mouth daily    Constipation, unspecified constipation type       EMLA cream   Generic drug:  lidocaine-prilocaine     30 g    APPLY TO ACCESS SITE 30 TO 60 MINUTES PRIOR TO        DIALYSIS    ESRD (end stage renal disease) on dialysis (H)       EPINEPHrine 0.3 MG/0.3ML injection 2-pack    EPIPEN/ADRENACLICK/or ANY BX GENERIC EQUIV    0.6 mL    Inject 0.3 mLs (0.3 mg) into the muscle as needed for anaphylaxis    Anaphylaxis, subsequent encounter       levETIRAcetam 250 MG tablet    KEPPRA    175 tablet    Take  1 1/2 tabs daily.  Take an additional 250 mg after dialysis on M, W, F.    Partial symptomatic epilepsy with complex partial seizures, not intractable, without status epilepticus (H)       levothyroxine 25 MCG tablet    SYNTHROID/LEVOTHROID    102 tablet    Take 1 tablet (25 mcg) Tuesday, Thursday, Saturday and Sunday and 1.5 tablets (37.5 mcg) on  Monday, Wednesday and Friday every week.    Acquired hypothyroidism       NEPHROCAPS 1 MG capsule     30 capsule    Take 1 capsule (1 mg) by mouth daily    ESRD needing dialysis (H)       norethindrone 0.35 MG per tablet    MICRONOR    28 tablet    Take 1 tablet (0.35 mg) by mouth daily    Surveillance of previously prescribed contraceptive pill       ondansetron 4 MG ODT tab    ZOFRAN ODT    60 tablet    Take 1-2 tablets (4-8 mg) by mouth every 8 hours as needed for nausea    Nausea       SENSIPAR 30 MG tablet   Generic drug:  cinacalcet      Take 60 mg by mouth daily        sevelamer 800 MG tablet    RENVELA     Take 800 mg by mouth 3 times daily (with meals) Patient takes 3-4 tablets 3 times a day and 1 with snack prn        simethicone 125 MG Chew chewable tablet    MYLICON     Take 125 mg by mouth 2 times daily        TUMS CHEWY BITES 750 MG Chew   Generic drug:  calcium carbonate      Take 750 mg by mouth 3 times daily        VITAMIN D (CHOLECALCIFEROL) PO      Take 1,000 Units by mouth daily

## 2018-07-24 NOTE — PROGRESS NOTES
Assessment and Plan:  1. Kidney transplant wait list evaluation - Ms. Wagner is an excellent candidate overall. She should remain active on the wait list.  2. ESKD from unknown etiology - doing well on hemodialysis since 2013, but would benefit from a kidney transplant.   3. Cardiac risk - she has no known history of cardiac disease or events and is asymptomatic with exertion. May 2017 ECHO with EF 60-65%, no valvular abnormalities, and trace pericardial effusion unchanged from 2016.  4. Chronic ITP - evaluated by hematology in 2016 with no treatment indicated at that time. Will obtain dialysis labs to review last 6 months of CBCs.  5. Localization-related epilepsy - she is maintained on Keppra, followed by neurology, and has not had recurrence of seizure.  6. Hyperprolactinemia - she continues to follow with endocrinology.  7. Health maintenance - pap smear up to date.     Discussed the risks and benefits of a transplant, including the risk of surgery and immunosuppression medications.    KDPI: We discussed approximate remaining wait time and how that is influenced by issues such as blood type and sensitization (PRA) and access to a living donor. I contrasted potential waiting time for living vs  donor kidneys from  normal (0-85%) or higher (%) kidney donor profile index (KDPI) donors and their associated outcomes. I would not recommend Ms. Wagner to consider kidneys from high KDPI donors. The reason for this decision is best summarized as: improved long term graft survival.    Patient s overall re-evaluation may require further discussion in the Transplant Program s multidisciplinary selection committee for a final recommendation on the patient s suitability for transplant.     Reason for Visit:  Mona Wagner is a 27-year-old female with ESKD from unknown etiology who presents for kidney transplant wait list evaluation.     Last Evaluation Clinic Visit Date:  2016        Wait List  Date: 12/13/2013  Current phase/status: Waitlist: Active as of 1/18/2016  Transplant coordinator: Soumya Sylvester Transplant Office phone number [phone]     Previous Medical Issues:  #Chest pressure with UF on dialysis   #Hyperprolactinemia secondary to pituitary microadenoma      HPI: Ms. Wagner is a 27-year-old female with ESKD from unknown etiology on dialysis since December 2013, SAH and seizure, hypothyroid, and hyperprolactinemia secondary to pituitary microadenoma.         Interim events/issues         She had hematology evaluation in December 2016 for intermittent thrombocytopenia since 2014 and thought to likely have chronic ITP. She believes her platelets have been around 150k most recently.    Dialysis is going well. She is currently dialyzing via a left wrist AVF that she says is working well. She is anuric.    She has not had any seizure recurrence since her first episode in September 2014. Keppra dose was decreased in December 2016. 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 She continues to follow with endocrinology for hyperprolactinemia and has not started cabergoline.     Overall, she is feeling well. Her energy level and appetite have remained stable. 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 She denies chest pain, SOB, or claudication symptoms with exertion. She currently denies nausea, vomiting, or diarrhea. No fevers, sweats, chills, or recent illness. No recent hospitalizations or blood transfusions.          Kidney Disease Hx       Kidney Disease Dx: unknown etiology       Biopsy Proven: No        On Dialysis: Yes, Date initiated: December 2013 and Dialysis Type: [facility];       Primary Nephrologist: Dr. Parra          Uremic Symptoms: Fatigue: No; Cold: No; Nausea: No; Poor Appetite: No; Metallic Taste: No; Edema: No;          Potential Donor(s):  Yes          Cardiac history:       Last cardiac risk assessment: not required       Last ECHO May 2017. She has not had a stress test or coronary angiogram       Exertional symptoms: none       Recent cardiac events: none     Pertinent issues:   Blood transfusion: Yes  Jehovah s Witness: No  Pregnancies: No  Previous transplant: No  Urine output: No  Bladder dysfunction: No  Claudication: No  Previous amputation: No  Cancer: No  Recurrent infection: No  Chronic anticoagulation: No      ROS:  A comprehensive review of systems was obtained and negative, except as noted in the HPI or PMH.     PMH:  Medical records were obtained and reviewed.  Past Medical History:   Diagnosis Date     Anemia in chronic kidney disease      Dialysis patient (H)      End stage renal disease on dialysis (H)      Hypothyroid      Pituitary adenoma (H)         PSH:  Past Surgical History:   Procedure Laterality Date     CREATE FISTULA ARTERIOVENOUS UPPER EXTREMITY  2014    Procedure: CREATE FISTULA ARTERIOVENOUS UPPER EXTREMITY;  Left Wrist Arteriovenous Fistula Placement;  Surgeon: Shashi Castro MD;  Location: [facility] OR     RASTA/DIALYSIS CATHETER  12/10/2013             Personal Hx:  Social History     Social History     Marital status:      Spouse name: N/A     Number of children: N/A     Years of education: N/A     Occupational History     Not on file.     Social History Main Topics     Smoking status: Never Smoker     Smokeless tobacco: Never Used     Alcohol use No     Drug use: No     Sexual activity: Yes     Partners: Male     Other Topics Concern     Not on file     Social History Narrative    Date of Service:5/15/2013     Name: Mona VALDOVINOS (Month, Day, Year of birth): 1990     MRN: [id number]     New Patient: Yes    Preferred Language: English     Needed: No    County of Residence: [place]    Marital Status:     Household size: 8    Number of Dependents:0     Pregnant: 0    Average Monthly  Income: $ 600    Citizenship Status: Citizen    Gave Pt [employer] and [employer] applications        Allergies:  Allergies   Allergen Reactions     Chlorhexidine Rash     Rash at site     Sulfa Drugs Rash     Muscle stiffness of neck     Lisinopril Swelling     angioedema     Alcohol Swabs [Isopropyl Alcohol] Rash        Medications:  Prior to Admission medications    Medication Sig Start Date End Date Taking? Authorizing Provider   acetaminophen (TYLENOL) 325 MG tablet Take 2 tablets (650 mg) by mouth every 4 hours as needed for mild pain 1/17/18   Joseluis Freire MD   B complex-C-folic acid (NEPHROCAPS) 1 MG capsule Take 1 capsule (1 mg) by mouth daily 12/13/13   Conner Castañeda MD   calcium carbonate (TUMS CHEWY BITES) 750 MG CHEW Take 750 mg by mouth 3 times daily    Reported, Patient   cinacalcet (SENSIPAR) 30 MG tablet Take 60 mg by mouth daily     Reported, Patient   diphenhydrAMINE (BENADRYL) 25 MG tablet Take 1-2 tablets (25-50 mg) by mouth every 6 hours as needed for itching or allergies 1/17/18   Joseluis Freire MD   docusate sodium (COLACE) 100 MG tablet Take 100 mg by mouth daily 1/17/18   Joseluis Freire MD   EMLA cream APPLY TO ACCESS SITE 30 TO 60 MINUTES PRIOR TO        DIALYSIS 7/5/17   Delicia Parra MD   EPINEPHrine (EPIPEN/ADRENACLICK/OR ANY BX GENERIC EQUIV) 0.3 MG/0.3ML injection 2-pack Inject 0.3 mLs (0.3 mg) into the muscle as needed for anaphylaxis 1/17/18   Joseluis Freire MD   levETIRAcetam (KEPPRA) 250 MG tablet Take  1 1/2 tabs daily.  Take an additional 250 mg after dialysis on M, W, F. 6/19/18   Theresa Sapp MD   levothyroxine (SYNTHROID/LEVOTHROID) 25 MCG tablet Take 1 tablet (25 mcg) Tuesday, Thursday, Saturday and Sunday and 1.5 tablets (37.5 mcg) on Monday, Wednesday and Friday every week. 4/10/18   GEORGIA Coles MD   norethindrone (MICRONOR) 0.35 MG per tablet Take 1 tablet (0.35 mg) by mouth daily 5/25/18   Macarena Bowen MD    ondansetron (ZOFRAN ODT) 4 MG ODT tab Take 1-2 tablets (4-8 mg) by mouth every 8 hours as needed for nausea 4/12/17   Macarena Bowen MD   sevelamer (RENVELA) 800 MG tablet Take 800 mg by mouth 3 times daily (with meals) Patient takes 3-4 tablets 3 times a day and 1 with snack prn    Reported, Patient   simethicone (MYLICON) 125 MG CHEW chewable tablet Take 125 mg by mouth 2 times daily    Reported, Patient   VITAMIN D, CHOLECALCIFEROL, PO Take 1,000 Units by mouth daily    Reported, Patient        Vitals:  /75  Pulse 86  Resp 16  Ht 1.524 m (5')  Wt 52.2 kg (115 lb)  Breastfeeding? No  BMI 22.46 kg/m2     Exam:  GENERAL APPEARANCE: alert and no distress  HENT: mouth without ulcers or lesions. Good dentition  LYMPHATICS: no cervical or supraclavicular nodes  RESP: lungs clear to auscultation - no rales, rhonchi or wheezes  CV: regular rhythm, normal rate, no rub, no murmur  EDEMA: no LE edema bilaterally  ABDOMEN: soft, nondistended, nontender  MS: extremities normal - no gross deformities noted, no evidence of inflammation in joints, no muscle tenderness  SKIN: no rash  Femoral pulses 2+ equal bilterally

## 2018-07-24 NOTE — MR AVS SNAPSHOT
After Visit Summary   7/24/2018    Mona Wagner    MRN: [id number]           Patient Information     Date Of Birth          1990        Visit Information        Provider Department      7/24/2018 2:00 PM Lorin Hutchins PA-C [facility] Nephrology        Today's Diagnoses     ESRD (end stage renal disease) on dialysis (H)    -  1       Follow-ups after your visit        Your next 10 appointments already scheduled     Aug 23, 2018 10:00 AM CDT   Return Visit [facility] Theresa Sapp MD   [facility]    [address], [address]  [facility] [id number] [phone]            Nov 20, 2018  9:30 AM CST   (Arrive by 9:15 AM)   RETURN ENDOCRINE with GEORGIA Coles MD   [facility] ([facility])    [address]  3rd Floor  [place] [facility] [address] [phone]              Who to contact     If you have questions or need follow up information about today's clinic visit or your schedule please contact [facility] NEPHROLOGY directly at [phone].  Normal or non-critical lab and imaging results will be communicated to you by [employer], letter or phone within 4 business days after the clinic has received the results. If you do not hear from us within 7 days, please contact the clinic through [employer] or phone. If you have a critical or abnormal lab result, we will notify you by phone as soon as possible.  Submit refill requests through [employer] or call your pharmacy and they will forward the refill request to us. Please allow 3 business days for your refill to be completed.          Additional Information About Your Visit        MyChart Information     [employer] gives you secure access to your electronic health record. If you see a primary care provider, you can also send messages to your care team and make appointments. If you have questions, please call your primary care clinic.  If you do not have a  primary care provider, please call [phone] and they will assist you.        Care EveryWhere ID     This is your Care EveryWhere ID. This could be used by other organizations to access your [facility] medical records  [id number]        Your Vitals Were     Pulse Respirations Height Breastfeeding? BMI (Body Mass Index)       86 16 1.524 m (5') No 22.46 kg/m2        Blood Pressure from Last 3 Encounters:   07/24/18 112/75   07/24/18 114/76   05/29/18 (!) 150/99    Weight from Last 3 Encounters:   07/24/18 52.2 kg (115 lb)   07/24/18 52.2 kg (115 lb)   05/29/18 51.3 kg (113 lb)              Today, you had the following     No orders found for display         Today's Medication Changes          These changes are accurate as of 7/24/18 11:59 PM.  If you have any questions, ask your nurse or doctor.               Stop taking these medicines if you haven't already. Please contact your care team if you have questions.     ranitidine 150 MG tablet   Commonly known as:  ZANTAC   Stopped by:  Shashi Castro MD                    Primary Care Provider Office Phone # Fax #    Macarena Jackie Bowen [phone]       [facility] [address] [place] [address]        Equal Access to Services     RUCHI VILLAGRAN AH: Hadii stone ku hadasho Soomaali, waaxda luqadaha, qaybta kaalmada adeegyada, quynh olivas haysatish tierney . So [facility] [phone].    ATENCIÓN: Si habla español, tiene a gerber disposición servicios gratuitos de asistencia lingüística. Llame al [phone].    We comply with applicable federal civil rights laws and [place] laws. We do not discriminate on the basis of race, color, national origin, age, disability, sex, sexual orientation, or gender identity.            Thank you!     Thank you for choosing [facility] NEPHROLOGY  for your care. Our goal is always to provide you with excellent care. Hearing back from our patients is one way we can continue to improve our services.  Please take a few minutes to complete the written survey that you may receive in the mail after your visit with us. Thank you!             Your Updated Medication List - Protect others around you: Learn how to safely use, store and throw away your medicines at www.disposemymeds.org.          This list is accurate as of 7/24/18 11:59 PM.  Always use your most recent med list.                   Brand Name Dispense Instructions for use Diagnosis    acetaminophen 325 MG tablet    TYLENOL    100 tablet    Take 2 tablets (650 mg) by mouth every 4 hours as needed for mild pain    Back pain, unspecified back location, unspecified back pain laterality, unspecified chronicity       diphenhydrAMINE 25 MG tablet    BENADRYL    60 tablet    Take 1-2 tablets (25-50 mg) by mouth every 6 hours as needed for itching or allergies    Anaphylaxis, subsequent encounter       docusate sodium 100 MG tablet    COLACE    60 tablet    Take 100 mg by mouth daily    Constipation, unspecified constipation type       EMLA cream   Generic drug:  lidocaine-prilocaine     30 g    APPLY TO ACCESS SITE 30 TO 60 MINUTES PRIOR TO        DIALYSIS    ESRD (end stage renal disease) on dialysis (H)       EPINEPHrine 0.3 MG/0.3ML injection 2-pack    EPIPEN/ADRENACLICK/or ANY BX GENERIC EQUIV    0.6 mL    Inject 0.3 mLs (0.3 mg) into the muscle as needed for anaphylaxis    Anaphylaxis, subsequent encounter       levETIRAcetam 250 MG tablet    KEPPRA    175 tablet    Take  1 1/2 tabs daily.  Take an additional 250 mg after dialysis on M, W, F.    Partial symptomatic epilepsy with complex partial seizures, not intractable, without status epilepticus (H)       levothyroxine 25 MCG tablet    SYNTHROID/LEVOTHROID    102 tablet    Take 1 tablet (25 mcg) Tuesday, Thursday, Saturday and Sunday and 1.5 tablets (37.5 mcg) on Monday, Wednesday and Friday every week.    Acquired hypothyroidism       NEPHROCAPS 1 MG capsule     30 capsule    Take 1 capsule (1 mg) by  mouth daily    ESRD needing dialysis (H)       norethindrone 0.35 MG per tablet    MICRONOR    28 tablet    Take 1 tablet (0.35 mg) by mouth daily    Surveillance of previously prescribed contraceptive pill       ondansetron 4 MG ODT tab    ZOFRAN ODT    60 tablet    Take 1-2 tablets (4-8 mg) by mouth every 8 hours as needed for nausea    Nausea       SENSIPAR 30 MG tablet   Generic drug:  cinacalcet      Take 60 mg by mouth daily        sevelamer 800 MG tablet    RENVELA     Take 800 mg by mouth 3 times daily (with meals) Patient takes 3-4 tablets 3 times a day and 1 with snack prn        simethicone 125 MG Chew chewable tablet    MYLICON     Take 125 mg by mouth 2 times daily        TUMS CHEWY BITES 750 MG Chew   Generic drug:  calcium carbonate      Take 750 mg by mouth 3 times daily        VITAMIN D (CHOLECALCIFEROL) PO      Take 1,000 Units by mouth daily

## 2018-07-24 NOTE — MR AVS SNAPSHOT
After Visit Summary   7/24/2018    Mona Wagner    MRN: [id number]           Patient Information     Date Of Birth          1990        Visit Information        Provider Department      7/24/2018 2:00 PM Soumya Sylvester RN [facility] Solid Organ Transplant        Today's Diagnoses     Awaiting organ transplant    -  1       Follow-ups after your visit        Your next 10 appointments already scheduled     Jul 24, 2018  2:00 PM CDT   (Arrive by 1:30 PM)   RETURN WAIT LIST with Lorin Hutchins PA-C   [facility] Nephrology ([facility])    [address]  [address]  [place] [address]   [phone]            Jul 24, 2018  2:00 PM CDT   (Arrive by 1:45 PM)   SOT CARE COORDINATOR EVAL with Soumya Sylvester RN   [facility] Solid Organ Transplant ([facility])    [address]  [address]  [place] [address]   [phone]            Jul 24, 2018  3:00 PM CDT   (Arrive by 2:45 PM)   SOT SOCIAL WORK EVAL with AYDEN Cote   [facility] Solid Organ Transplant ([facility])    [address]  [address]  [place] [address]   [phone]            Jul 24, 2018  4:00 PM CDT   LAB with  LAB   [facility] Lab ([facility])    [address]  1st Floor  [place] [address] [phone]           Please do not eat 10-12 hours before your appointment if you are coming in fasting for labs on lipids, cholesterol, or glucose (sugar). This does not apply to pregnant women. Water, hot tea and black coffee (with nothing added) are okay. Do not drink other fluids, diet soda or chew gum.            Aug 23, 2018 10:00 AM CDT   Return Visit with MD [facility] ([facility])    [address], [address]  [place] [address]   [phone]            Nov 20, 2018  9:30 AM CST   (Arrive by 9:15 AM)   RETURN  ENDOCRINE with GEORGIA Coles MD   [facility] Endocrinology ([facility])    [address]  [address]rd [place] [facility] [place] [facility] [address] [phone]              Who to contact     If you have questions or need follow up information about today's clinic visit or your schedule please contact [facility] SOLID ORGAN TRANSPLANT directly at [phone].  Normal or non-critical lab and imaging results will be communicated to you by micecloudhart, letter or phone within 4 business days after the clinic has received the results. If you do not hear from us within 7 days, please contact the clinic through Voxifyt or phone. If you have a critical or abnormal lab result, we will notify you by phone as soon as possible.  Submit refill requests through Graftys or call your pharmacy and they will forward the refill request to us. Please allow 3 business days for your refill to be completed.          Additional Information About Your Visit        micecloudharInnovacene Information     Graftys gives you secure access to your electronic health record. If you see a primary care provider, you can also send messages to your care team and make appointments. If you have questions, please call your primary care clinic.  If you do not have a primary care provider, please call [phone] and they will assist you.        Care EveryWhere ID     This is your Care EveryWhere ID. This could be used by other organizations to access your [place] medical records  [id number]         Blood Pressure from Last 3 Encounters:   07/24/18 114/76   05/29/18 (!) 150/99   05/29/18 (!) 141/94    Weight from Last 3 Encounters:   07/24/18 52.2 kg (115 lb)   05/29/18 51.3 kg (113 lb)   05/29/18 51.6 kg (113 lb 12.8 oz)              Today, you had the following     No orders found for display         Today's Medication Changes          These changes are accurate as of 7/24/18 12:00 PM.  If you have any questions, ask your nurse or doctor.                Stop taking these medicines if you haven't already. Please contact your care team if you have questions.     ranitidine 150 MG tablet   Commonly known as:  ZANTAC   Stopped by:  Shashi Castro MD                    Primary Care Provider Office Phone # Fax #    Macarena Jackie Bowen [phone]       [facility] [address] [address]        Equal Access to Services     [facility]: Hadii aad ku hadasho Soomaali, waaxda luqadaha, qaybta kaalmada adeegyada, waxay idiin hayaan adeeg kharash la'aan ah. So [facility] [phone].    ATENCIÓN: Si rik espgumaro, tiene a gerber disposición servicios gratuitos de asistencia lingüística. Vikaame al [phone].    We comply with applicable federal civil rights laws and [place] laws. We do not discriminate on the basis of race, color, national origin, age, disability, sex, sexual orientation, or gender identity.            Thank you!     Thank you for choosing [facility] SOLID ORGAN TRANSPLANT  for your care. Our goal is always to provide you with excellent care. Hearing back from our patients is one way we can continue to improve our services. Please take a few minutes to complete the written survey that you may receive in the mail after your visit with us. Thank you!             Your Updated Medication List - Protect others around you: Learn how to safely use, store and throw away your medicines at www.disposemymeds.org.          This list is accurate as of 7/24/18 12:00 PM.  Always use your most recent med list.                   Brand Name Dispense Instructions for use Diagnosis    acetaminophen 325 MG tablet    TYLENOL    100 tablet    Take 2 tablets (650 mg) by mouth every 4 hours as needed for mild pain    Back pain, unspecified back location, unspecified back pain laterality, unspecified chronicity       diphenhydrAMINE 25 MG tablet    BENADRYL    60 tablet    Take 1-2 tablets (25-50 mg) by mouth every 6 hours as needed for  itching or allergies    Anaphylaxis, subsequent encounter       docusate sodium 100 MG tablet    COLACE    60 tablet    Take 100 mg by mouth daily    Constipation, unspecified constipation type       EMLA cream   Generic drug:  lidocaine-prilocaine     30 g    APPLY TO ACCESS SITE 30 TO 60 MINUTES PRIOR TO        DIALYSIS    ESRD (end stage renal disease) on dialysis (H)       EPINEPHrine 0.3 MG/0.3ML injection 2-pack    EPIPEN/ADRENACLICK/or ANY BX GENERIC EQUIV    0.6 mL    Inject 0.3 mLs (0.3 mg) into the muscle as needed for anaphylaxis    Anaphylaxis, subsequent encounter       levETIRAcetam 250 MG tablet    KEPPRA    175 tablet    Take  1 1/2 tabs daily.  Take an additional 250 mg after dialysis on M, W, F.    Partial symptomatic epilepsy with complex partial seizures, not intractable, without status epilepticus (H)       levothyroxine 25 MCG tablet    SYNTHROID/LEVOTHROID    102 tablet    Take 1 tablet (25 mcg) Tuesday, Thursday, Saturday and Sunday and 1.5 tablets (37.5 mcg) on Monday, Wednesday and Friday every week.    Acquired hypothyroidism       NEPHROCAPS 1 MG capsule     30 capsule    Take 1 capsule (1 mg) by mouth daily    ESRD needing dialysis (H)       norethindrone 0.35 MG per tablet    MICRONOR    28 tablet    Take 1 tablet (0.35 mg) by mouth daily    Surveillance of previously prescribed contraceptive pill       ondansetron 4 MG ODT tab    ZOFRAN ODT    60 tablet    Take 1-2 tablets (4-8 mg) by mouth every 8 hours as needed for nausea    Nausea       SENSIPAR 30 MG tablet   Generic drug:  cinacalcet      Take 60 mg by mouth daily        sevelamer 800 MG tablet    RENVELA     Take 800 mg by mouth 3 times daily (with meals) Patient takes 3-4 tablets 3 times a day and 1 with snack prn        simethicone 125 MG Chew chewable tablet    MYLICON     Take 125 mg by mouth 2 times daily        TUMS CHEWY BITES 750 MG Chew   Generic drug:  calcium carbonate      Take 750 mg by mouth 3 times daily         VITAMIN D (CHOLECALCIFEROL) PO      Take 1,000 Units by mouth daily

## 2018-07-24 NOTE — PROGRESS NOTES
Met with Mona today for her return waitlist appointment. Introduced self and what my role is as her coordinator.   Discussed her recent medical history and dialysis.   Reviewed living donor search and offered support.  Mona is active with Medicare/Medicaid in place.   Contact card given to Mona  All questions answered.

## 2018-07-24 NOTE — LETTER
7/24/2018       RE: Mona Wagner  [address]  [place] [address]     Dear Colleague,    Thank you for referring your patient, Mona Wagner, to the [facility] SOLID ORGAN TRANSPLANT at [facility]. Please see a copy of my visit note below.    Mona Wagner  1990  [id number]  Jul 24, 2018      Mona comes to clinic today for updating on the kidney transplant waitlist. She reports being in her usual state of health, doing ok on dialysis.   She has 4.5 years wait time and is blood type O, relatively unsensitized.  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  She reports she has a LD in evaluation. 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  She does not endorse pain or increase in size.  She has been using button holes away from the site for almost 2 years.     /76 (BP Location: Right arm, Patient Position: Chair, Cuff Size: Adult Regular)  Pulse 96  Temp 98.5  F (36.9  C) (Oral)  Resp 18  Ht 1.524 m (5')  Wt 52.2 kg (115 lb)  SpO2 100%  BMI 22.46 kg/m2    Exam: thin female, 2+ femoral pulses bilaterally  L AVF: 2 pseudoaneurysms, one with skin thinning and hemosiderin petechiae suggestive of outflow issue.  Her button holes are central to this.  The cephalic vein has matured all the way to the upper arm.      A/P   ESRD: appropriate to remain active on the waitlist from a surgical perspective.   I encouraged her to have a conversation with her donor to determine if the donor will proceed in evaluation.  Otherwise I would expect her to wait another 1 year or so for a DD kidney. I encouraged her to wait for the best possible graft as she is at high risk for retranpslant given her young age.     Complication of vascular access: asymptomatic pseudoaneurysm. No treatment indication at this time, however if she starts to have symptoms, she should establish a more proximal button hole because surgical revision of the aneurysm  will likely involve her most peripheral button hole. I counselled her of the symptoms to watch for and to request an appointment with surgery if/when issues arise.     Total time: 25 min  Counselling time 20 min.      Shashi Castro MD, MS  Associate Professor of Surgery   Division of Transplantation  Surgical Director, Kidney Transplant Program  Surgical Director, Living Kidney Donor Program  Associate Surgical Director, Chronic Pancreatitis Program  [facility]  [phone]

## 2018-07-25 LAB — BLD GP AB SCN TITR SERPL: NORMAL {TITER}

## 2018-07-26 ENCOUNTER — DOCUMENTATION ONLY (OUTPATIENT)
Dept: TRANSPLANT | Facility: CLINIC | Age: 28
End: 2018-07-26

## 2018-07-26 NOTE — PROGRESS NOTES
Mona Wagner  1990  [id number]  Jul 24, 2018      Mona comes to clinic today for updating on the kidney transplant waitlist. She reports being in her usual state of health, doing ok on dialysis.   She has 4.5 years wait time and is blood type O, relatively unsensitized.  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  She reports she has a LD in evaluation. 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  She does not endorse pain or increase in size.  She has been using button holes away from the site for almost 2 years.     /76 (BP Location: Right arm, Patient Position: Chair, Cuff Size: Adult Regular)  Pulse 96  Temp 98.5  F (36.9  C) (Oral)  Resp 18  Ht 1.524 m (5')  Wt 52.2 kg (115 lb)  SpO2 100%  BMI 22.46 kg/m2    Exam: thin female, 2+ femoral pulses bilaterally  L AVF: 2 pseudoaneurysms, one with skin thinning and hemosiderin petechiae suggestive of outflow issue.  Her button holes are central to this.  The cephalic vein has matured all the way to the upper arm.      A/P   ESRD: appropriate to remain active on the waitlist from a surgical perspective.   I encouraged her to have a conversation with her donor to determine if the donor will proceed in evaluation.  Otherwise I would expect her to wait another 1 year or so for a DD kidney. I encouraged her to wait for the best possible graft as she is at high risk for retranpslant given her young age.     Complication of vascular access: asymptomatic pseudoaneurysm. No treatment indication at this time, however if she starts to have symptoms, she should establish a more proximal button hole because surgical revision of the aneurysm will likely involve her most peripheral button hole. I counselled her of the symptoms to watch for and to request an appointment with surgery if/when issues arise.     Total time: 25 min  Counselling time 20 min.      Shashi Castro MD, MS  Associate Professor of Surgery   Division of  Transplantation  Surgical Director, Kidney Transplant Program  Surgical Director, Living Kidney Donor Program  Associate Surgical Director, Chronic Pancreatitis Program  [facility]  [phone]

## 2018-07-28 NOTE — PROGRESS NOTES
"Patient Name: Mona Wagner  : 1990  Age: 27 year old  MRN: [id number]  Date of Initial Social Work Evaluation: 13; Update 16    Patient on kidney transplant wait list active.  Saw today to update psychosocial assessment.      Presenting Information   Living Situation: In [place], [place] with her cultural  Jt and his parents and siblings.  If not local, plans for short term stay:  N/A  Previous Functional Status: Independent  Cultural/Language/Spiritual Considerations: N/A    Support System  Primary Support Person Jt  Other support:  Mona's family who also live in [place], [place]  Plan for support in immediate post-transplant period: Jt    Health Care Directive  Decision Maker: Self  Alternate Decision Maker: Legally Mona's parents  Health Care Directive: Provided Copy and Provided education    Mental Health/Coping:   History of Mental Health: No known history  History of Chemical Health: No known history  Current status: Appropriate   Coping: Mona indicated when under stress she will \"just calm myself down\".  Services Needed/Recommended: None at this time.    Financial   Income: Mona is working part time and Jt is employed full time.  Impact of transplant on income: Should be able to return to work post transplant.  Insurance and medication coverage: Medicare, [place] and Part D through [employer]  Financial concerns: None at this time.  Resources needed: None at this time.    Assessment and recommendations and plan: Discussed post transplant requirements, insurance coverage post transplant and pregnancy.  Mona indicated understanding and encouraged patient to contact this writer with any questions.  Mona continues to be an appropriate transplant candidate.   Reviewed transplant education (Medicare, rehabilitation, donor issues, community/financial resources, and psych/family adjustment) as well as psychosocial risks of transplant. Provided patient with a copy of post-transplant informational " sheet that includes information on potential costs of medications, Medicare ESRD, post-transplant lodging, etc. Patient seemed to process information well. Appeared well informed, motivated, and able to follow post transplant requirements. Behavior was appropriate during interview. Has adequate income and insurance coverage. Adequate social support. No major contraindications noted for transplant. At this time, patient appears to understand the risks and benefits of transplant.

## 2018-08-20 ENCOUNTER — DOCUMENTATION ONLY (OUTPATIENT)
Dept: TRANSPLANT | Facility: CLINIC | Age: 28
End: 2018-08-20

## 2018-09-06 ENCOUNTER — OFFICE VISIT (OUTPATIENT)
Dept: NEUROLOGY | Facility: CLINIC | Age: 28
End: 2018-09-06
Payer: MEDICARE

## 2018-09-06 VITALS
SYSTOLIC BLOOD PRESSURE: 130 MMHG | HEART RATE: 73 BPM | WEIGHT: 120 LBS | BODY MASS INDEX: 23.44 KG/M2 | DIASTOLIC BLOOD PRESSURE: 77 MMHG | TEMPERATURE: 98.1 F

## 2018-09-06 DIAGNOSIS — G40.209 PARTIAL SYMPTOMATIC EPILEPSY WITH COMPLEX PARTIAL SEIZURES, NOT INTRACTABLE, WITHOUT STATUS EPILEPTICUS (H): ICD-10-CM

## 2018-09-06 RX ORDER — LEVETIRACETAM 250 MG/1
TABLET ORAL
Qty: 175 TABLET | Refills: 0 | Status: SHIPPED | OUTPATIENT
Start: 2018-09-06 | End: 2018-12-04

## 2018-09-06 ASSESSMENT — PAIN SCALES - GENERAL: PAINLEVEL: NO PAIN (0)

## 2018-09-06 NOTE — PROGRESS NOTES
[facility] Epilepsy Care Progress Note      Patient:  Mona Wagner  :  1990   Age:  27 year old   Today's Office Visit:  2018    Epilepsy Data:    The patient had 2 seizures in 2014, one at home and one in the hospital.  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  She was on warfarin since 2014 for DVT from a prior dialysis catheter site.  She was put on Dilantin initially.  She was switched to Keppra when she was first seen here, due to teratogenicity of Dilantin.  Compliant with medication.   She has a pituitary microadenoma, which is followed up by an endocrinologist.     History of Present Illness:    Mona is here for a follow up on her seizures.  She did not have any seizures since last visit in 2018.  She is taking Keppra and is tolerating well.      Last week on , she had an episode while on dialysis.  She started seeing zigzag lines in left eye, then had pain behind right eye, got nauseas, then the whole body got weak, especially legs and couldn't walk.  Got Narco at ER and the pain went away, but she couldn't walk till the next day.  3-4 years ago similar episode happened, also on dialysis.  She doesn't have a h/o migraine.     She is in contact with a girl from [place] who wants to be a donor for kidney transplant and is visiting her next week.  She is pretty excited about this.    Current Outpatient Prescriptions   Medication Sig Dispense Refill     acetaminophen (TYLENOL) 325 MG tablet Take 2 tablets (650 mg) by mouth every 4 hours as needed for mild pain 100 tablet 1     B complex-C-folic acid (NEPHROCAPS) 1 MG capsule Take 1 capsule (1 mg) by mouth daily 30 capsule 3     calcium carbonate (TUMS CHEWY BITES) 750 MG CHEW Take 750 mg by mouth 3 times daily       cinacalcet (SENSIPAR) 30 MG tablet Take 60 mg by mouth daily        diphenhydrAMINE (BENADRYL) 25 MG tablet Take 1-2 tablets (25-50 mg) by mouth every 6 hours as needed for itching or allergies 60 tablet 1     docusate  sodium (COLACE) 100 MG tablet Take 100 mg by mouth daily 60 tablet 1     EMLA cream APPLY TO ACCESS SITE 30 TO 60 MINUTES PRIOR TO        DIALYSIS 30 g 11     EPINEPHrine (EPIPEN/ADRENACLICK/OR ANY BX GENERIC EQUIV) 0.3 MG/0.3ML injection 2-pack Inject 0.3 mLs (0.3 mg) into the muscle as needed for anaphylaxis 0.6 mL 3     levETIRAcetam (KEPPRA) 250 MG tablet Take  1 1/2 tabs daily.  Take an additional 250 mg after dialysis on M, W, F. 175 tablet 0     levothyroxine (SYNTHROID/LEVOTHROID) 25 MCG tablet Take 1 tablet (25 mcg) Tuesday, Thursday, Saturday and Sunday and 1.5 tablets (37.5 mcg) on Monday, Wednesday and Friday every week. 102 tablet 1     norethindrone (MICRONOR) 0.35 MG per tablet Take 1 tablet (0.35 mg) by mouth daily 28 tablet 3     ondansetron (ZOFRAN ODT) 4 MG ODT tab Take 1-2 tablets (4-8 mg) by mouth every 8 hours as needed for nausea 60 tablet 1     sevelamer (RENVELA) 800 MG tablet Take 800 mg by mouth 3 times daily (with meals) Patient takes 3-4 tablets 3 times a day and 1 with snack prn       simethicone (MYLICON) 125 MG CHEW chewable tablet Take 125 mg by mouth 2 times daily       VITAMIN D, CHOLECALCIFEROL, PO Take 1,000 Units by mouth daily          Medication Notes:        AED Medication Compliance:  compliant most of the time  Using a pill box:  Yes    Review of Systems:  Lethargy / Tiredness:  Yes  Nausea / Vomiting:  No  Double Vision:  No  Sleepiness:  No  Depression:  No  Slowed Cognitive Function:  No  Memory Problems:  No  Poor Balance:  No  Dizziness:  No  Appetite Changes:  No  Blurred Vision:  No  Headaches: Yes, after dialysis had a migraine  Sleep Changes:  No  Behavioral Changes:  No  Skin: negative  Respiratory: No shortness of breath and No cough  Cardiovascular: negative  Have you experienced a traumatic fall since your last visit: NO    Other Issues:    Is patient safe to drive:  Yes    Woman Care:   Patient is:    Sexually Active:  Yes  Type of Birth Control:  Pill:  Yoanna/Norethindione  Pregnant:  no.  Planning to become pregnant:  No  Currently Breastfeeding:  No    Exam:    /77 (BP Location: Right arm, Patient Position: Chair, Cuff Size: Adult Regular)  Pulse 73  Temp 98.1  F (36.7  C)  Wt 120 lb (54.4 kg)  BMI 23.44 kg/m2     Wt Readings from Last 5 Encounters:   09/06/18 120 lb (54.4 kg)   07/24/18 115 lb (52.2 kg)   07/24/18 115 lb (52.2 kg)   05/29/18 113 lb (51.3 kg)   05/29/18 113 lb 12.8 oz (51.6 kg)     General Appearance: Alert, awake, cooperative, pleasant, NAD  Gait and tandem gait: steady  Attention Span:  Normal  Language/speech: no aphasia or dysarthria  Extraocular Movements:  Normal  Coordination:  Normal FNF  Facial Strength:  Normal  Tongue Strength:  Normal  Motor Exam: normal tone, bulk and strength 5/5 bilaterally  DTRs: 2+ symmetric, toes downgoing    Assessment and Plan:    1) Focal impaired-aware seizures, h/o left frontal SAH: seizures are controlled on levetiracetam monotherapy.  She denies side effects.      - Continue Keppra as before.  - Obtain Keppra level for efficacy     2) Episodes of headache which start with zigzag lines, associated with nausea and generalized weakness; very sporadic, one happened last week and one 3 years ago.  Probable complicated migraine headaches.  She has a history of SAH, so Triptans are not safe.  The frequency is very rare and doesn't warrant a prophylactic medication.  I suggested to monitor symptoms and if they increase, will consider a prophylactic medication.     - RTC in 6 months.      As described above, I met with the patient for 20 minutes and during this time counseling was within 50% of the visit time.  Theresa Sapp MD

## 2018-09-06 NOTE — LETTER
2018       RE: Mona Wagner  : 1990   MRN: [id number]      Dear Colleague,    Thank you for referring your patient, Mona Wagner, to the [facility] EPILEPSY [facility] at [facility]. Please see a copy of my visit note below.    [facility] [place] [facility] [place]      Patient:  Mona Wagner  :  1990   Age:  27 year old   Today's Office Visit:  2018    Epilepsy Data:    The patient had 2 seizures in 2014, one at home and one in the hospital.  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  She was on warfarin since 2014 for DVT from a prior dialysis catheter site.  She was put on Dilantin initially.  She was switched to Keppra when she was first seen here, due to teratogenicity of Dilantin.  Compliant with medication.   She has a pituitary microadenoma, which is followed up by an endocrinologist.     History of Present Illness:    Mona is here for a follow up on her seizures.  She did not have any seizures since last visit in 2018.  She is taking Keppra and is tolerating well.      Last week on , she had an episode while on dialysis.  She started seeing zigzag lines in left eye, then had pain behind right eye, got nauseas, then the whole body got weak, especially legs and couldn't walk.  Got Narco at ER and the pain went away, but she couldn't walk till the next day.  3-4 years ago similar episode happened, also on dialysis.  She doesn't have a h/o migraine.     She is in contact with a girl from [place] who wants to be a donor for kidney transplant and is visiting her next week.  She is pretty excited about this.    Current Outpatient Prescriptions   Medication Sig Dispense Refill     acetaminophen (TYLENOL) 325 MG tablet Take 2 tablets (650 mg) by mouth every 4 hours as needed for mild pain 100 tablet 1     B complex-C-folic acid (NEPHROCAPS) 1 MG capsule Take 1 capsule (1 mg) by mouth daily 30 capsule 3     calcium carbonate (TUMS CHEWY BITES) 750 MG  CHEW Take 750 mg by mouth 3 times daily       cinacalcet (SENSIPAR) 30 MG tablet Take 60 mg by mouth daily        diphenhydrAMINE (BENADRYL) 25 MG tablet Take 1-2 tablets (25-50 mg) by mouth every 6 hours as needed for itching or allergies 60 tablet 1     docusate sodium (COLACE) 100 MG tablet Take 100 mg by mouth daily 60 tablet 1     EMLA cream APPLY TO ACCESS SITE 30 TO 60 MINUTES PRIOR TO        DIALYSIS 30 g 11     EPINEPHrine (EPIPEN/ADRENACLICK/OR ANY BX GENERIC EQUIV) 0.3 MG/0.3ML injection 2-pack Inject 0.3 mLs (0.3 mg) into the muscle as needed for anaphylaxis 0.6 mL 3     levETIRAcetam (KEPPRA) 250 MG tablet Take  1 1/2 tabs daily.  Take an additional 250 mg after dialysis on M, W, F. 175 tablet 0     levothyroxine (SYNTHROID/LEVOTHROID) 25 MCG tablet Take 1 tablet (25 mcg) Tuesday, Thursday, Saturday and Sunday and 1.5 tablets (37.5 mcg) on Monday, Wednesday and Friday every week. 102 tablet 1     norethindrone (MICRONOR) 0.35 MG per tablet Take 1 tablet (0.35 mg) by mouth daily 28 tablet 3     ondansetron (ZOFRAN ODT) 4 MG ODT tab Take 1-2 tablets (4-8 mg) by mouth every 8 hours as needed for nausea 60 tablet 1     sevelamer (RENVELA) 800 MG tablet Take 800 mg by mouth 3 times daily (with meals) Patient takes 3-4 tablets 3 times a day and 1 with snack prn       simethicone (MYLICON) 125 MG CHEW chewable tablet Take 125 mg by mouth 2 times daily       VITAMIN D, CHOLECALCIFEROL, PO Take 1,000 Units by mouth daily          Medication Notes:        AED Medication Compliance:  compliant most of the time  Using a pill box:  Yes    Review of Systems:  Lethargy / Tiredness:  Yes  Nausea / Vomiting:  No  Double Vision:  No  Sleepiness:  No  Depression:  No  Slowed Cognitive Function:  No  Memory Problems:  No  Poor Balance:  No  Dizziness:  No  Appetite Changes:  No  Blurred Vision:  No  Headaches: Yes, after dialysis had a migraine  Sleep Changes:  No  Behavioral Changes:  No  Skin: negative  Respiratory: No  shortness of breath and No cough  Cardiovascular: negative  Have you experienced a traumatic fall since your last visit: NO    Other Issues:    Is patient safe to drive:  Yes    Woman Care:   Patient is:    Sexually Active:  Yes  Type of Birth Control:  Pill: Yoanna/Norethindione  Pregnant:  no.  Planning to become pregnant:  No  Currently Breastfeeding:  No    Exam:    /77 (BP Location: Right arm, Patient Position: Chair, Cuff Size: Adult Regular)  Pulse 73  Temp 98.1  F (36.7  C)  Wt 120 lb (54.4 kg)  BMI 23.44 kg/m2     Wt Readings from Last 5 Encounters:   09/06/18 120 lb (54.4 kg)   07/24/18 115 lb (52.2 kg)   07/24/18 115 lb (52.2 kg)   05/29/18 113 lb (51.3 kg)   05/29/18 113 lb 12.8 oz (51.6 kg)     General Appearance: Alert, awake, cooperative, pleasant, NAD  Gait and tandem gait: steady  Attention Span:  Normal  Language/speech: no aphasia or dysarthria  Extraocular Movements:  Normal  Coordination:  Normal FNF  Facial Strength:  Normal  Tongue Strength:  Normal  Motor Exam: normal tone, bulk and strength 5/5 bilaterally  DTRs: 2+ symmetric, toes downgoing    Assessment and Plan:    1) Focal impaired-aware seizures, h/o left frontal SAH: seizures are controlled on levetiracetam monotherapy.  She denies side effects.      - Continue Keppra as before.  - Obtain Keppra level for efficacy     2) Episodes of headache which start with zigzag lines, associated with nausea and generalized weakness; very sporadic, one happened last week and one 3 years ago.  Probable complicated migraine headaches.  She has a history of SAH, so Triptans are not safe.  The frequency is very rare and doesn't warrant a prophylactic medication.  I suggested to monitor symptoms and if they increase, will consider a prophylactic medication.     - RTC in 6 months.      As described above, I met with the patient for 20 minutes and during this time counseling was within 50% of the visit time.  Theresa Sapp  MD                        Again, thank you for allowing me to participate in the care of your patient.      Sincerely,    Theresa Sapp MD

## 2018-09-06 NOTE — MR AVS SNAPSHOT
After Visit Summary   9/6/2018    Mona Wagner    MRN: [id number]           Patient Information     Date Of Birth          1990        Visit Information        Provider Department      9/6/2018 9:00 AM Theresa Sapp MD [facility]        Today's Diagnoses     Partial symptomatic epilepsy with complex partial seizures, not intractable, without status epilepticus (H)           Follow-ups after your visit        Follow-up notes from your care team     Return in about 6 months (around 3/6/2019).      Your next 10 appointments already scheduled     Nov 20, 2018  9:30 AM CST   (Arrive by 9:15 AM)   RETURN ENDOCRINE with GEORGIA Coles MD   [facility] ([facility] [place])    [address]  3rd Floor  [facility] [address] [phone]            Mar 07, 2019  9:30 AM CST   Return Visit with MD [facility] ([facility])    [address] [place], [address]  [facility] [address] [phone]              Future tests that were ordered for you today     Open Future Orders        Priority Expected Expires Ordered    Keppra (Levetiracetam) Level Routine  11/6/2018 9/6/2018            Who to contact     Please call your clinic at [phone] to:    Ask questions about your health    Make or cancel appointments    Discuss your medicines    Learn about your test results    Speak to your doctor            Additional Information About Your Visit        MyChart Information     [employer] gives you secure access to your electronic health record. If you see a primary care provider, you can also send messages to your care team and make appointments. If you have questions, please call your primary care clinic.  If you do not have a primary care provider, please call [phone] and they will assist you.      [employer] is an electronic gateway that provides easy, online access to your medical records.  With [employer], you can request a clinic appointment, read your test results, renew a prescription or communicate with your care team.     To access your existing account, please contact your [facility] or call [phone] for assistance.        Care EveryWhere ID     This is your Care EveryWhere ID. This could be used by other organizations to access your [place] medical records  [id number]        Your Vitals Were     Pulse Temperature BMI (Body Mass Index)             73 98.1  F (36.7  C) 23.44 kg/m2          Blood Pressure from Last 3 Encounters:   09/06/18 130/77   07/24/18 112/75   07/24/18 114/76    Weight from Last 3 Encounters:   09/06/18 120 lb (54.4 kg)   07/24/18 115 lb (52.2 kg)   07/24/18 115 lb (52.2 kg)                 Where to get your medicines      These medications were sent to [id number] - [place], [place] - [address] [place] [address] AT [facility] [address]  [address], [place] [address]     Phone:  [phone]     levETIRAcetam 250 MG tablet          Primary Care Provider Office Phone # Fax #    Macarena Bowen [phone]       [facility] [address] [place] [address]        Equal Access to Services     RUCHI VILLAGRAN AH: Hadii aad ku hadasho Soomaali, waaxda luqadaha, qaybta kaalmada adeegyada, waxay vickiein haysatish cool. So [facility] [phone].    ATENCIÓN: Si habla español, tiene a gerber disposición servicios gratuitos de asistencia lingüística. Llame al [phone].    We comply with applicable federal civil rights laws and [place] laws. We do not discriminate on the basis of race, color, national origin, age, disability, sex, sexual orientation, or gender identity.            Thank you!     Thank you for choosing [facility]PILEPS[facility]  for your care. Our goal is always to provide you with excellent care. Hearing back from our patients is one way we can continue to improve our services. Please  take a few minutes to complete the written survey that you may receive in the mail after your visit with us. Thank you!             Your Updated Medication List - Protect others around you: Learn how to safely use, store and throw away your medicines at www.disposemymeds.org.          This list is accurate as of 9/6/18  9:57 AM.  Always use your most recent med list.                   Brand Name Dispense Instructions for use Diagnosis    acetaminophen 325 MG tablet    TYLENOL    100 tablet    Take 2 tablets (650 mg) by mouth every 4 hours as needed for mild pain    Back pain, unspecified back location, unspecified back pain laterality, unspecified chronicity       diphenhydrAMINE 25 MG tablet    BENADRYL    60 tablet    Take 1-2 tablets (25-50 mg) by mouth every 6 hours as needed for itching or allergies    Anaphylaxis, subsequent encounter       docusate sodium 100 MG tablet    COLACE    60 tablet    Take 100 mg by mouth daily    Constipation, unspecified constipation type       EMLA cream   Generic drug:  lidocaine-prilocaine     30 g    APPLY TO ACCESS SITE 30 TO 60 MINUTES PRIOR TO        DIALYSIS    ESRD (end stage renal disease) on dialysis (H)       EPINEPHrine 0.3 MG/0.3ML injection 2-pack    EPIPEN/ADRENACLICK/or ANY BX GENERIC EQUIV    0.6 mL    Inject 0.3 mLs (0.3 mg) into the muscle as needed for anaphylaxis    Anaphylaxis, subsequent encounter       levETIRAcetam 250 MG tablet    KEPPRA    175 tablet    Take  1 1/2 tabs daily.  Take an additional 250 mg after dialysis on M, W, F.    Partial symptomatic epilepsy with complex partial seizures, not intractable, without status epilepticus (H)       levothyroxine 25 MCG tablet    SYNTHROID/LEVOTHROID    102 tablet    Take 1 tablet (25 mcg) Tuesday, Thursday, Saturday and Sunday and 1.5 tablets (37.5 mcg) on Monday, Wednesday and Friday every week.    Acquired hypothyroidism       NEPHROCAPS 1 MG capsule     30 capsule    Take 1 capsule (1 mg) by mouth daily     ESRD needing dialysis (H)       norethindrone 0.35 MG per tablet    MICRONOR    28 tablet    Take 1 tablet (0.35 mg) by mouth daily    Surveillance of previously prescribed contraceptive pill       ondansetron 4 MG ODT tab    ZOFRAN ODT    60 tablet    Take 1-2 tablets (4-8 mg) by mouth every 8 hours as needed for nausea    Nausea       SENSIPAR 30 MG tablet   Generic drug:  cinacalcet      Take 60 mg by mouth daily        sevelamer 800 MG tablet    RENVELA     Take 800 mg by mouth 3 times daily (with meals) Patient takes 3-4 tablets 3 times a day and 1 with snack prn        simethicone 125 MG Chew chewable tablet    MYLICON     Take 125 mg by mouth 2 times daily        TUMS CHEWY BITES 750 MG Chew   Generic drug:  calcium carbonate      Take 750 mg by mouth 3 times daily        VITAMIN D (CHOLECALCIFEROL) PO      Take 1,000 Units by mouth daily

## 2018-09-07 DIAGNOSIS — E03.9 ACQUIRED HYPOTHYROIDISM: ICD-10-CM

## 2018-09-10 DIAGNOSIS — Z30.41 SURVEILLANCE OF PREVIOUSLY PRESCRIBED CONTRACEPTIVE PILL: ICD-10-CM

## 2018-09-10 RX ORDER — LEVOTHYROXINE SODIUM 25 UG/1
TABLET ORAL
Qty: 102 TABLET | Refills: 2 | Status: SHIPPED | OUTPATIENT
Start: 2018-09-10 | End: 2019-05-20

## 2018-09-10 NOTE — TELEPHONE ENCOUNTER
Message to physician:     Date of last visit: 1/17/2018     Date of next visit if scheduled: Visit date not found       Last Comprehensive Metabolic Panel:  Sodium   Date Value Ref Range Status   12/29/2016 138 133 - 144 mmol/L Final     Potassium   Date Value Ref Range Status   03/16/2017 4.0 3.4 - 5.3 mmol/L Final     Chloride   Date Value Ref Range Status   12/29/2016 99 94 - 109 mmol/L Final     Carbon Dioxide   Date Value Ref Range Status   12/29/2016 32 20 - 32 mmol/L Final     Anion Gap   Date Value Ref Range Status   12/29/2016 8 3 - 14 mmol/L Final     Glucose   Date Value Ref Range Status   12/29/2016 74 70 - 99 mg/dL Final     Urea Nitrogen   Date Value Ref Range Status   12/29/2016 42 (H) 7 - 30 mg/dL Final     Creatinine   Date Value Ref Range Status   12/29/2016 8.50 (H) 0.52 - 1.04 mg/dL Final     GFR Estimate   Date Value Ref Range Status   12/29/2016 6 (L) >60 mL/min/1.7m2 Final     Comment:     Non  GFR Calc     Calcium   Date Value Ref Range Status   12/29/2016 10.4 (H) 8.5 - 10.1 mg/dL Final       BP Readings from Last 3 Encounters:   09/06/18 130/77   07/24/18 112/75   07/24/18 114/76       No results found for: A1C             Please complete refill and CLOSE ENCOUNTER.  Closing the encounter signifies the refill is complete.

## 2018-09-12 ENCOUNTER — RESULTS ONLY (OUTPATIENT)
Dept: OTHER | Facility: CLINIC | Age: 28
End: 2018-09-12

## 2018-09-12 DIAGNOSIS — N18.6 END STAGE RENAL DISEASE (H): ICD-10-CM

## 2018-09-12 DIAGNOSIS — Z76.82 ORGAN TRANSPLANT CANDIDATE: ICD-10-CM

## 2018-09-13 RX ORDER — ACETAMINOPHEN AND CODEINE PHOSPHATE 120; 12 MG/5ML; MG/5ML
0.35 SOLUTION ORAL DAILY
Qty: 28 TABLET | Refills: 3 | Status: SHIPPED | OUTPATIENT
Start: 2018-09-13 | End: 2018-12-27

## 2018-09-17 LAB
PRA SINGLE ANTIGEN IGG ANTIBODY: NORMAL
SA1 CELL: NORMAL
SA1 COMMENTS: NORMAL
SA1 HI RISK ABY: NORMAL
SA1 MOD RISK ABY: NORMAL
SA1 TEST METHOD: NORMAL
SA2 CELL: NORMAL
SA2 COMMENTS: NORMAL
SA2 HI RISK ABY UA: NORMAL
SA2 MOD RISK ABY: NORMAL
SA2 TEST METHOD: NORMAL

## 2018-09-26 ENCOUNTER — TELEPHONE (OUTPATIENT)
Dept: FAMILY MEDICINE | Facility: CLINIC | Age: 28
End: 2018-09-26

## 2018-09-26 NOTE — TELEPHONE ENCOUNTER
[facility] phone call message- general phone call:    Reason for call: Per patient is arranged to come in for just lab work on Friday but she has a question if she can have lab work done for an outside Dr. Coles. There are future orders from May 2018 but not sure how to go about this. Patient has a hard copy of lab order for Dr. Bianchi only.     Return call needed: Yes    OK to leave a message on voice mail? Yes    Primary language: English      needed? No    Call taken on September 26, 2018 at 11:35 AM by Nga Kelly

## 2018-09-26 NOTE — TELEPHONE ENCOUNTER
Called patient back, informed her to bring hard copy of lab order from Dr. Bianchi.  Pt. Requesting Thyroid blood work to be done in addition for Dr. Coles in which there are future lab orders placed from May of 2018.  Told patient it will be fine for both blood work.

## 2018-09-28 DIAGNOSIS — E03.9 ACQUIRED HYPOTHYROIDISM: ICD-10-CM

## 2018-09-28 DIAGNOSIS — G40.209 PARTIAL SYMPTOMATIC EPILEPSY WITH COMPLEX PARTIAL SEIZURES, NOT INTRACTABLE, WITHOUT STATUS EPILEPTICUS (H): ICD-10-CM

## 2018-09-28 DIAGNOSIS — G40.909 SEIZURE DISORDER (H): Primary | ICD-10-CM

## 2018-09-28 LAB
T4 FREE SERPL-MCNC: 1.04 NG/DL (ref 0.76–1.46)
TSH SERPL DL<=0.005 MIU/L-ACNC: 2.29 MU/L (ref 0.4–4)

## 2018-09-28 PROCEDURE — 84439 ASSAY OF FREE THYROXINE: CPT | Performed by: INTERNAL MEDICINE

## 2018-09-28 PROCEDURE — 80177 DRUG SCRN QUAN LEVETIRACETAM: CPT | Performed by: INTERNAL MEDICINE

## 2018-09-28 PROCEDURE — 84443 ASSAY THYROID STIM HORMONE: CPT | Performed by: INTERNAL MEDICINE

## 2018-09-29 LAB — LEVETIRACETAM SERPL-MCNC: 21 UG/ML (ref 12–46)

## 2018-12-04 DIAGNOSIS — G40.209 PARTIAL SYMPTOMATIC EPILEPSY WITH COMPLEX PARTIAL SEIZURES, NOT INTRACTABLE, WITHOUT STATUS EPILEPTICUS (H): ICD-10-CM

## 2018-12-06 RX ORDER — LEVETIRACETAM 250 MG/1
TABLET ORAL
Qty: 175 TABLET | Refills: 0 | Status: ON HOLD | OUTPATIENT
Start: 2018-12-06 | End: 2019-08-04

## 2018-12-06 NOTE — TELEPHONE ENCOUNTER
Nurse received refill request for: Lev 250 mg     Last re-ordered:  9/6/18 - 3 mo supply    Last refill: 9/10/18    Last appointment: 9/16/18    Next appointment:  3/7/18    From last clinic notes:  1) Focal impaired-aware seizures, h/o left frontal SAH: seizures are controlled on levetiracetam monotherapy.  She denies side effects.       - Continue Keppra as before.  - Obtain Keppra level for efficacy      2) Episodes of headache which start with zigzag lines, associated with nausea and generalized weakness; very sporadic, one happened last week and one 3 years ago.  Probable complicated migraine headaches.  She has a history of SAH, so Triptans are not safe.  The frequency is very rare and doesn't warrant a prophylactic medication.  I suggested to monitor symptoms and if they increase, will consider a prophylactic medication.      - RTC in 6 months    Pharmacy: [facility] # [id number]    Action taken:   Refilled x 3 mo.

## 2018-12-10 DIAGNOSIS — R14.0 BLOATED ABDOMEN: Primary | ICD-10-CM

## 2018-12-10 RX ORDER — SIMETHICONE 125 MG
125 TABLET,CHEWABLE ORAL 2 TIMES DAILY
Qty: 180 TABLET | Refills: 3 | Status: SHIPPED | OUTPATIENT
Start: 2018-12-10 | End: 2019-01-29

## 2018-12-12 DIAGNOSIS — N18.6 ESRD (END STAGE RENAL DISEASE) (H): ICD-10-CM

## 2018-12-12 DIAGNOSIS — Z76.82 ORGAN TRANSPLANT CANDIDATE: ICD-10-CM

## 2018-12-18 DIAGNOSIS — Z76.82 ORGAN TRANSPLANT CANDIDATE: Primary | ICD-10-CM

## 2018-12-18 DIAGNOSIS — N18.6 ESRD (END STAGE RENAL DISEASE) (H): ICD-10-CM

## 2018-12-20 LAB
PROTOCOL CUTOFF: NORMAL
SA1 CELL: NORMAL
SA1 COMMENTS: NORMAL
SA1 HI RISK ABY: NORMAL
SA1 MOD RISK ABY: NORMAL
SA1 TEST METHOD: NORMAL
SA2 CELL: NORMAL
SA2 COMMENTS: NORMAL
SA2 HI RISK ABY UA: NORMAL
SA2 MOD RISK ABY: NORMAL
SA2 TEST METHOD: NORMAL
UNACCEPTABLE ANTIGEN: NORMAL
UNOS CPRA: 25

## 2018-12-27 DIAGNOSIS — Z30.41 SURVEILLANCE OF PREVIOUSLY PRESCRIBED CONTRACEPTIVE PILL: ICD-10-CM

## 2018-12-28 RX ORDER — ACETAMINOPHEN AND CODEINE PHOSPHATE 120; 12 MG/5ML; MG/5ML
0.35 SOLUTION ORAL DAILY
Qty: 28 TABLET | Refills: 11 | Status: ON HOLD | OUTPATIENT
Start: 2018-12-28 | End: 2019-08-12

## 2019-01-05 ENCOUNTER — MYC MEDICAL ADVICE (OUTPATIENT)
Dept: NEPHROLOGY | Facility: CLINIC | Age: 29
End: 2019-01-05

## 2019-01-05 DIAGNOSIS — R25.2 MUSCLE CRAMPS: Primary | ICD-10-CM

## 2019-01-06 ENCOUNTER — HOSPITAL ENCOUNTER (EMERGENCY)
Facility: CLINIC | Age: 29
Discharge: HOME | End: 2019-01-06
Attending: EMERGENCY MEDICINE | Admitting: EMERGENCY MEDICINE
Payer: MEDICARE

## 2019-01-06 VITALS
HEIGHT: 60 IN | OXYGEN SATURATION: 98 % | RESPIRATION RATE: 14 BRPM | DIASTOLIC BLOOD PRESSURE: 88 MMHG | WEIGHT: 123.7 LBS | TEMPERATURE: 99.6 F | SYSTOLIC BLOOD PRESSURE: 151 MMHG | BODY MASS INDEX: 24.29 KG/M2 | HEART RATE: 100 BPM

## 2019-01-06 DIAGNOSIS — R50.9 FEVER, UNSPECIFIED FEVER CAUSE: ICD-10-CM

## 2019-01-06 DIAGNOSIS — M79.10 MYALGIA: ICD-10-CM

## 2019-01-06 LAB
ALBUMIN SERPL-MCNC: 3.5 G/DL (ref 3.4–5)
ALP SERPL-CCNC: 63 U/L (ref 40–150)
ALT SERPL W P-5'-P-CCNC: 14 U/L (ref 0–50)
ANION GAP SERPL CALCULATED.3IONS-SCNC: 12 MMOL/L (ref 3–14)
AST SERPL W P-5'-P-CCNC: 8 U/L (ref 0–45)
BASOPHILS # BLD AUTO: 0 10E9/L (ref 0–0.2)
BASOPHILS NFR BLD AUTO: 0.5 %
BILIRUB SERPL-MCNC: 0.7 MG/DL (ref 0.2–1.3)
BUN SERPL-MCNC: 54 MG/DL (ref 7–30)
CALCIUM SERPL-MCNC: 9.9 MG/DL (ref 8.5–10.1)
CHLORIDE SERPL-SCNC: 94 MMOL/L (ref 94–109)
CO2 SERPL-SCNC: 30 MMOL/L (ref 20–32)
CREAT SERPL-MCNC: 13.4 MG/DL (ref 0.52–1.04)
DIFFERENTIAL METHOD BLD: ABNORMAL
EOSINOPHIL # BLD AUTO: 0 10E9/L (ref 0–0.7)
EOSINOPHIL NFR BLD AUTO: 0.3 %
ERYTHROCYTE [DISTWIDTH] IN BLOOD BY AUTOMATED COUNT: 12.6 % (ref 10–15)
FLUAV+FLUBV AG SPEC QL: NEGATIVE
FLUAV+FLUBV AG SPEC QL: NEGATIVE
GFR SERPL CREATININE-BSD FRML MDRD: 3 ML/MIN/{1.73_M2}
GLUCOSE SERPL-MCNC: 112 MG/DL (ref 70–99)
HCG SERPL QL: NEGATIVE
HCT VFR BLD AUTO: 29.4 % (ref 35–47)
HGB BLD-MCNC: 9.3 G/DL (ref 11.7–15.7)
IMM GRANULOCYTES # BLD: 0 10E9/L (ref 0–0.4)
IMM GRANULOCYTES NFR BLD: 0 %
LACTATE BLD-SCNC: 1.1 MMOL/L (ref 0.7–2)
LYMPHOCYTES # BLD AUTO: 0.9 10E9/L (ref 0.8–5.3)
LYMPHOCYTES NFR BLD AUTO: 15.2 %
MCH RBC QN AUTO: 31.1 PG (ref 26.5–33)
MCHC RBC AUTO-ENTMCNC: 31.6 G/DL (ref 31.5–36.5)
MCV RBC AUTO: 98 FL (ref 78–100)
MONOCYTES # BLD AUTO: 0.4 10E9/L (ref 0–1.3)
MONOCYTES NFR BLD AUTO: 6.2 %
NEUTROPHILS # BLD AUTO: 4.8 10E9/L (ref 1.6–8.3)
NEUTROPHILS NFR BLD AUTO: 77.8 %
NRBC # BLD AUTO: 0 10*3/UL
NRBC BLD AUTO-RTO: 0 /100
PLATELET # BLD AUTO: 125 10E9/L (ref 150–450)
POTASSIUM SERPL-SCNC: 3.7 MMOL/L (ref 3.4–5.3)
PROT SERPL-MCNC: 7.5 G/DL (ref 6.8–8.8)
RBC # BLD AUTO: 2.99 10E12/L (ref 3.8–5.2)
SODIUM SERPL-SCNC: 135 MMOL/L (ref 133–144)
SPECIMEN SOURCE: NORMAL
TROPONIN I SERPL-MCNC: <0.015 UG/L (ref 0–0.04)
WBC # BLD AUTO: 6.1 10E9/L (ref 4–11)

## 2019-01-06 PROCEDURE — 83605 ASSAY OF LACTIC ACID: CPT | Performed by: EMERGENCY MEDICINE

## 2019-01-06 PROCEDURE — 25000128 H RX IP 250 OP 636: Performed by: EMERGENCY MEDICINE

## 2019-01-06 PROCEDURE — 83735 ASSAY OF MAGNESIUM: CPT | Performed by: EMERGENCY MEDICINE

## 2019-01-06 PROCEDURE — 25000132 ZZH RX MED GY IP 250 OP 250 PS 637: Mod: GY | Performed by: EMERGENCY MEDICINE

## 2019-01-06 PROCEDURE — 80053 COMPREHEN METABOLIC PANEL: CPT | Performed by: EMERGENCY MEDICINE

## 2019-01-06 PROCEDURE — 96360 HYDRATION IV INFUSION INIT: CPT | Performed by: EMERGENCY MEDICINE

## 2019-01-06 PROCEDURE — 87804 INFLUENZA ASSAY W/OPTIC: CPT | Mod: 91 | Performed by: EMERGENCY MEDICINE

## 2019-01-06 PROCEDURE — 93010 ELECTROCARDIOGRAM REPORT: CPT | Mod: Z6 | Performed by: EMERGENCY MEDICINE

## 2019-01-06 PROCEDURE — 85025 COMPLETE CBC W/AUTO DIFF WBC: CPT | Performed by: EMERGENCY MEDICINE

## 2019-01-06 PROCEDURE — A9270 NON-COVERED ITEM OR SERVICE: HCPCS | Mod: GY | Performed by: EMERGENCY MEDICINE

## 2019-01-06 PROCEDURE — 84484 ASSAY OF TROPONIN QUANT: CPT | Performed by: EMERGENCY MEDICINE

## 2019-01-06 PROCEDURE — 99284 EMERGENCY DEPT VISIT MOD MDM: CPT | Mod: 25 | Performed by: EMERGENCY MEDICINE

## 2019-01-06 PROCEDURE — 93005 ELECTROCARDIOGRAM TRACING: CPT | Performed by: EMERGENCY MEDICINE

## 2019-01-06 PROCEDURE — 82550 ASSAY OF CK (CPK): CPT | Performed by: EMERGENCY MEDICINE

## 2019-01-06 PROCEDURE — 84703 CHORIONIC GONADOTROPIN ASSAY: CPT | Performed by: EMERGENCY MEDICINE

## 2019-01-06 RX ORDER — AMLODIPINE BESYLATE 10 MG/1
5 TABLET ORAL DAILY
COMMUNITY
End: 2019-03-05

## 2019-01-06 RX ORDER — HYDROCODONE BITARTRATE AND ACETAMINOPHEN 5; 325 MG/1; MG/1
1 TABLET ORAL ONCE
Status: COMPLETED | OUTPATIENT
Start: 2019-01-06 | End: 2019-01-06

## 2019-01-06 RX ORDER — SODIUM CHLORIDE 9 MG/ML
1000 INJECTION, SOLUTION INTRAVENOUS CONTINUOUS
Status: DISCONTINUED | OUTPATIENT
Start: 2019-01-06 | End: 2019-01-06

## 2019-01-06 RX ORDER — DOXAZOSIN 2 MG/1
2 TABLET ORAL AT BEDTIME
COMMUNITY
End: 2019-03-05

## 2019-01-06 RX ADMIN — HYDROCODONE BITARTRATE AND ACETAMINOPHEN 1 TABLET: 5; 325 TABLET ORAL at 16:06

## 2019-01-06 RX ADMIN — SODIUM CHLORIDE 1000 ML: 900 INJECTION, SOLUTION INTRAVENOUS at 14:06

## 2019-01-06 RX ADMIN — HYDROCODONE BITARTRATE AND ACETAMINOPHEN 1 TABLET: 5; 325 TABLET ORAL at 14:44

## 2019-01-06 ASSESSMENT — ENCOUNTER SYMPTOMS
APPETITE CHANGE: 1
COUGH: 1
EYE REDNESS: 0
HEADACHES: 0
MYALGIAS: 1
COLOR CHANGE: 0
ARTHRALGIAS: 0
ABDOMINAL PAIN: 0
NECK STIFFNESS: 0
SHORTNESS OF BREATH: 1
CONFUSION: 0
DIFFICULTY URINATING: 0
CONSTIPATION: 1
FEVER: 1
DIARRHEA: 0
DIAPHORESIS: 1
FATIGUE: 1

## 2019-01-06 ASSESSMENT — MIFFLIN-ST. JEOR: SCORE: 1222.6

## 2019-01-06 NOTE — ED TRIAGE NOTES
Pt presents to ED with fever of 101.5 at home, headache, dry cough and muscle aches since Friday. Pt gets dialysis MWF and states her symptoms started 2 hours into her Friday evening dialysis run.

## 2019-01-06 NOTE — ED PROVIDER NOTES
[place] EMERGENCY DEPARTMENT ([facility])  1/06/19   History     Chief Complaint   Patient presents with     Fever     Flu Symptoms     The history is provided by the patient.     Mona Wagner is a 28 year old female with a medical history significant for ESRD on HD (MWF), hypertension, DVT, seizure disorder and hypothyroidism who presents to the Emergency Department for evaluation of fevers, generalized body aches, cough, shortness of breath and headaches since 1/4/2019.  The patient reports that her symptoms started while at dialysis on 1/4/2019; they started with generalized body aches.  The patient had difficulty sleeping due to the pain, she did take a tablet of Vicodin.  This improved her pain, but her pain has since returned.  She does note spiking fever to 101.5  F at home.  She notes that she has had a cough and associated shortness of breath.  She denies any chest pain.  She notes increased fatigue, a decrease in appetite and diaphoresis.  She does note receiving her influenza immunization this year.  She denies any diarrhea or leg swelling.  The patient also believes that she is constipated as she has taken Vicodin, her last bowel movement was on 1/4/2019.  Of note, the patient reports that her  was recently ill, but he began feeling better yesterday.  The patient also notes that her hemoglobin has been low recently and she is wanting this checked today.  No other symptoms noted.    I have reviewed the Medications, Allergies, Past Medical and Surgical History, and Social History in the [employer] system.    Past Medical History:   Diagnosis Date     Anemia in chronic kidney disease      Dialysis patient (H)      End stage renal disease on dialysis (H)      Hypertension      Hypothyroid      Pituitary adenoma (H)        Past Surgical History:   Procedure Laterality Date     CREATE FISTULA ARTERIOVENOUS UPPER EXTREMITY  1/2/2014    Procedure: CREATE FISTULA ARTERIOVENOUS UPPER EXTREMITY;  Left  Wrist Arteriovenous Fistula Placement;  Surgeon: Shashi Castro MD;  Location: [facility] OR     RASTA/DIALYSIS CATHETER  12/10/2013            Family History   Problem Relation Age of Onset     Hypertension Sister      Diabetes Sister      Cerebrovascular Disease Sister      Kidney Disease Mother      Kidney Disease Sister      Cerebrovascular Disease Father      Coronary Artery Disease No family hx of      Breast Cancer No family hx of      Cancer - colorectal No family hx of      Ovarian Cancer No family hx of      Prostate Cancer No family hx of      Other Cancer No family hx of      Asthma No family hx of        Social History     Tobacco Use     Smoking status: Never Smoker     Smokeless tobacco: Never Used   Substance Use Topics     Alcohol use: No     Alcohol/week: 0.0 oz       Current Facility-Administered Medications   Medication     0.9% sodium chloride BOLUS    Followed by     sodium chloride 0.9% infusion     Current Outpatient Medications   Medication     amLODIPine (NORVASC) 10 MG tablet     doxazosin (CARDURA) 2 MG tablet     acetaminophen (TYLENOL) 325 MG tablet     B complex-C-folic acid (NEPHROCAPS) 1 MG capsule     calcium carbonate (TUMS CHEWY BITES) 750 MG CHEW     cinacalcet (SENSIPAR) 30 MG tablet     diphenhydrAMINE (BENADRYL) 25 MG tablet     docusate sodium (COLACE) 100 MG tablet     EMLA cream     EPINEPHrine (EPIPEN/ADRENACLICK/OR ANY BX GENERIC EQUIV) 0.3 MG/0.3ML injection 2-pack     levETIRAcetam (KEPPRA) 250 MG tablet     levothyroxine (SYNTHROID/LEVOTHROID) 25 MCG tablet     norethindrone (MICRONOR) 0.35 MG tablet     ondansetron (ZOFRAN ODT) 4 MG ODT tab     sevelamer (RENVELA) 800 MG tablet     simethicone (MYLICON) 125 MG chewable tablet     VITAMIN D, CHOLECALCIFEROL, PO        Allergies   Allergen Reactions     Chlorhexidine Rash     Rash at site     Sulfa Drugs Rash     Muscle stiffness of neck     Lisinopril Swelling     angioedema     Alcohol Swabs [Isopropyl Alcohol] Rash          Review of Systems   Constitutional: Positive for appetite change (decrease), diaphoresis, fatigue and fever.   HENT: Negative for congestion.    Eyes: Negative for redness.   Respiratory: Positive for cough and shortness of breath.    Cardiovascular: Negative for chest pain and leg swelling.   Gastrointestinal: Positive for constipation. Negative for abdominal pain and diarrhea.   Genitourinary: Negative for difficulty urinating.   Musculoskeletal: Positive for myalgias. Negative for arthralgias and neck stiffness.   Skin: Negative for color change.   Neurological: Negative for headaches.   Psychiatric/Behavioral: Negative for confusion.   All other systems reviewed and are negative.      Physical Exam   BP: (!) 149/99  Pulse: 120  Temp: 99.7  F (37.6  C)  Resp: 16  Height: 152.4 cm (5')  Weight: 56.1 kg (123 lb 11.2 oz)  SpO2: 92 %      Physical Exam   Constitutional: She is oriented to person, place, and time. She appears well-developed and well-nourished. No distress.   HENT:   Head: Normocephalic and atraumatic.   Mouth/Throat: No oropharyngeal exudate.   Eyes: Pupils are equal, round, and reactive to light. Right eye exhibits no discharge. Left eye exhibits no discharge. No scleral icterus.   Neck: Normal range of motion. Neck supple.   Cardiovascular: Regular rhythm, normal heart sounds and intact distal pulses. Tachycardia present. Exam reveals no gallop and no friction rub.   No murmur heard.  Pulmonary/Chest: Effort normal and breath sounds normal. No respiratory distress. She has no wheezes. She exhibits no tenderness.   Abdominal: Soft. Bowel sounds are normal. She exhibits no distension. There is no tenderness.   Musculoskeletal: Normal range of motion. She exhibits no edema, tenderness or deformity.   Dialysis fistula in left arm.   Neurological: She is alert and oriented to person, place, and time. No cranial nerve deficit.   Skin: Skin is warm and dry. No rash noted. She is not diaphoretic. No  erythema. No pallor.   Psychiatric: She has a normal mood and affect.   Nursing note and vitals reviewed.      ED Course   1:23 PM  The patient was seen and examined by Baljit Kapadia DO in [facility]             EKG Interpretation:      Interpreted by Baljit Kapadia  Time reviewed: 1329  Symptoms at time of EKG: None   Rhythm: normal sinus   Rate: 97  Axis: Right Axis Deviation  Ectopy: none  Conduction: normal  ST Segments/ T Waves: No ST-T wave changes  Q Waves: nonspecific  Comparison to prior: Unchanged from 12/10/13    Clinical Impression: no acute changes                Critical Care time:  none             Labs Ordered and Resulted from Time of ED Arrival Up to the Time of Departure from the ED - No data to display         Assessments & Plan (with Medical Decision Making)   Is a 28-year-old female on dialysis who presents with myalgias and fever.  Symptoms began on Friday while undergoing dialysis.  Patient did have some relief of symptoms over the weekend after taking hydrocodone acetaminophen.  On exam patient was initially tachycardic.  CBC showed no acute abnormalities.  CMP showed no acute abnormalities, patient is known to have renal failure.  Influenza was negative.  Lactic acid was not elevated.  Troponin was not elevated.  ECG showed no acute changes.  Symptoms appear to be consistent with a viral syndrome.  Was given fluid and hydrocodone-acetaminophen in the emergency department.  She did have a relief of symptoms and was requesting discharge.  Discussed the importance of going to dialysis tomorrow.  Will discharge home with return precautions. Discussed reasons to return to the emergency department.  Patient understands and agrees with this plan.    I have reviewed the nursing notes.    I have reviewed the findings, diagnosis, plan and need for follow up with the patient.       Medication List      There are no discharge medications for this visit.         Final diagnoses:    None     I, Jose Alejandro Coburn, am serving as a trained medical scribe to document services personally performed by Baljit Kapadia DO, based on the provider's statements to me.   I, Baljit Kapadia DO, was physically present and have reviewed and verified the accuracy of this note documented by Jose Alejandro Coburn.    1/6/2019   [facility], [place], EMERGENCY DEPARTMENT     Baljit Kapadia DO  01/06/19 0184

## 2019-01-06 NOTE — LETTER
2  January 6, 2019      To Whom It May Concern:      Mona Wagner was seen in our Emergency Department today, 01/06/19.  I expect her condition to improve over the next 2 days.  She may return to work when improved.    Sincerely,        Baljit Kapadia, DO

## 2019-01-06 NOTE — DISCHARGE INSTRUCTIONS
Return to the emergency department if symptoms continue, get worse, there are any new symptoms or any cause for concern.

## 2019-01-06 NOTE — ED AVS SNAPSHOT
[facility], [place], Emergency Department  [address] [place] [id number]  Phone:  [phone]                                    Mona Wagner   MRN: [id number]    Department:  [facility], Emergency Department   Date of Visit:  1/6/2019           After Visit Summary Signature Page    I have received my discharge instructions, and my questions have been answered. I have discussed any challenges I see with this plan with the nurse or doctor.    ..........................................................................................................................................  Patient/Patient Representative Signature      ..........................................................................................................................................  Patient Representative Print Name and Relationship to Patient    ..................................................               ................................................  Date                                   Time    ..........................................................................................................................................  Reviewed by Signature/Title    ...................................................              ..............................................  Date                                               Time          22EPIC Rev 08/18

## 2019-01-07 DIAGNOSIS — R25.2 MUSCLE CRAMPS: Primary | ICD-10-CM

## 2019-01-07 LAB
CK SERPL-CCNC: 38 U/L (ref 30–225)
INTERPRETATION ECG - MUSE: NORMAL
MAGNESIUM SERPL-MCNC: 2.1 MG/DL (ref 1.6–2.3)

## 2019-01-15 ENCOUNTER — OFFICE VISIT (OUTPATIENT)
Dept: FAMILY MEDICINE | Facility: CLINIC | Age: 29
End: 2019-01-15
Payer: MEDICARE

## 2019-01-15 ENCOUNTER — TELEPHONE (OUTPATIENT)
Dept: FAMILY MEDICINE | Facility: CLINIC | Age: 29
End: 2019-01-15

## 2019-01-15 VITALS
HEIGHT: 60 IN | OXYGEN SATURATION: 96 % | DIASTOLIC BLOOD PRESSURE: 93 MMHG | WEIGHT: 124.6 LBS | HEART RATE: 114 BPM | BODY MASS INDEX: 24.46 KG/M2 | TEMPERATURE: 99.5 F | SYSTOLIC BLOOD PRESSURE: 147 MMHG

## 2019-01-15 DIAGNOSIS — J98.8 VIRAL RESPIRATORY ILLNESS: ICD-10-CM

## 2019-01-15 DIAGNOSIS — R05.9 COUGH: Primary | ICD-10-CM

## 2019-01-15 DIAGNOSIS — N93.9 VAGINAL BLEEDING: ICD-10-CM

## 2019-01-15 DIAGNOSIS — I10 ESSENTIAL HYPERTENSION: ICD-10-CM

## 2019-01-15 DIAGNOSIS — F41.0 PANIC ATTACK: ICD-10-CM

## 2019-01-15 DIAGNOSIS — B97.89 VIRAL RESPIRATORY ILLNESS: ICD-10-CM

## 2019-01-15 LAB
ALBUMIN SERPL BCP-MCNC: 3.5 G/DL (ref 3.5–5)
ALP SERPL-CCNC: 56 U/L (ref 45–120)
ALT SERPL W/O P-5'-P-CCNC: <9 U/L (ref 0–45)
ANION GAP SERPL CALCULATED.3IONS-SCNC: 18 MMOL/L (ref 5–18)
AST SERPL-CCNC: 8 U/L (ref 0–40)
B-HCG SERPL-ACNC: <2 MLU/ML (ref 0–4)
BASOPHILS # BLD AUTO: 0 THOU/UL (ref 0–0.2)
BASOPHILS NFR BLD AUTO: 0 % (ref 0–2)
BILIRUB SERPL-MCNC: 0.9 MG/DL (ref 0–1)
BUN SERPL-MCNC: 26 MG/DL (ref 8–22)
CALCIUM SERPL-MCNC: 10.9 MG/DL (ref 8.5–10.5)
CHLORIDE SERPL-SCNC: 93 MMOL/L (ref 98–107)
CO2 SERPL-SCNC: 26 MMOL/L (ref 22–31)
CREAT SERPL-MCNC: 9.19 MG/DL (ref 0.6–1.1)
CRP SERPL-MCNC: 11.2 MG/DL (ref 0–0.8)
EOSINOPHIL # BLD AUTO: 0.1 THOU/UL (ref 0–0.4)
EOSINOPHIL NFR BLD AUTO: 1 % (ref 0–6)
ERYTHROCYTE [DISTWIDTH] IN BLOOD BY AUTOMATED COUNT: 12.2 % (ref 11–14.5)
ERYTHROCYTE [SEDIMENTATION RATE] IN BLOOD: 107 MM/HR (ref 0–20)
GLUCOSE SERPL-MCNC: 101 MG/DL (ref 70–125)
HCT VFR BLD AUTO: 27.1 % (ref 35–47)
HGB BLD-MCNC: 8.7 G/DL (ref 12–16)
LYMPHOCYTES # BLD AUTO: 1 THOU/UL (ref 0.8–4.4)
LYMPHOCYTES NFR BLD AUTO: 10 % (ref 20–40)
MCH RBC QN AUTO: 30.9 PG (ref 27–34)
MCHC RBC AUTO-ENTMCNC: 32.1 G/DL (ref 32–36)
MCV RBC AUTO: 96 FL (ref 80–100)
MONOCYTES # BLD AUTO: 1.2 THOU/UL (ref 0–0.9)
MONOCYTES NFR BLD AUTO: 13 % (ref 2–10)
NEUTROPHILS # BLD AUTO: 7.1 THOU/UL (ref 2–7.7)
NEUTROPHILS NFR BLD AUTO: 76 % (ref 50–70)
PLATELET # BLD AUTO: 150 THOU/UL (ref 140–440)
PMV BLD AUTO: 9.1 FL (ref 8.5–12.5)
POTASSIUM SERPL-SCNC: 3.8 MMOL/L (ref 3.5–5)
PROT SERPL-MCNC: 7.3 G/DL (ref 6–8)
RBC # BLD AUTO: 2.82 MILL/UL (ref 3.8–5.4)
SODIUM SERPL-SCNC: 137 MMOL/L (ref 136–145)
WBC # BLD AUTO: 9.4 THOU/UL (ref 4–11)

## 2019-01-15 RX ORDER — HYDROCODONE BITARTRATE AND ACETAMINOPHEN 5; 325 MG/1; MG/1
1 TABLET ORAL EVERY 6 HOURS PRN
Qty: 18 TABLET | Refills: 0 | Status: SHIPPED | OUTPATIENT
Start: 2019-01-15 | End: 2019-01-29

## 2019-01-15 RX ORDER — HYDROXYZINE HYDROCHLORIDE 25 MG/1
25 TABLET, FILM COATED ORAL EVERY 6 HOURS PRN
Qty: 20 TABLET | Refills: 0 | Status: SHIPPED | OUTPATIENT
Start: 2019-01-15 | End: 2019-01-29

## 2019-01-15 ASSESSMENT — MIFFLIN-ST. JEOR: SCORE: 1224.19

## 2019-01-15 NOTE — PROGRESS NOTES
"       HPI:       Mona Wagner is a 28 year old  Female with history ESRD of unknown etiology who presents to address the following concerns:    Evaluate fever:  -Patient is a patient well known to the clinic but not to myself, presenting to follow up fever, chills, and myalgias since January 4th of this year.    Patient reports that her symptoms started January 4th.  She reports that two hours into her dialysis run her lower legs started hurting and felt like they were going to cramp. Finished her run despite the pain.  Patient reports that she was still in pain when she returned home and she took tylenol.  This did not help.  Midnight on the 4th the patient reports taht she developed fever and bodyaches throught the body.     She had some Norco at home and she took it for her pain which helped.     On January 5th patient reports that she again took norco for pain.  Developed a fever at night with chilld.  Took dayquil/nyquil to help but this made her feel like her heart was pounding.  Reports 4-5 episodes of chils/sweats overnight.    On January 6th th e patient experienced continued body aches, chills, and fever.  Went to the emergency department.  Work up included CBC which was signficant for normal white count, rapid flu which was negative, and BMP which was consistent with known renal failure but no signifcant metabolic abnormalities.  Told she had a flu like illness and send home with some pain medicine    Jan 7-10 continued dialysis, continued fever/chills and sweats    Jan 11-13: fever and chills improved but \"still hot and cold\"    Jan 14: increased intesity of fever.  Reports fever of 102.5 at home.  Elevated blood pressure at dialysis    Visit in clinic: no fever this morning but did take tylenol.  COntinued aches.  Has had occasional diarrhea but no other bowel sx.      Denies changes in skin.  Denies other HEENT sx.  Denies chest pain.  + some mild cough but non productive.   + withdrawal bleeding with " micronor which did not used to happen    Patient became tearful through course of our visit.  She endorses significant anxiety and stress related to her illness and is also asking for something related to this.  She reports that these sx have become worse with recent illness.            PMHX:     Patient Active Problem List   Diagnosis     ESRD (end stage renal disease) on dialysis (H)     DVT of upper extremity (deep vein thrombosis) (H)     Seizure disorder (H)     HTN (hypertension)     Other general symptoms(780.99)     Galactorrhea     Acquired hypothyroidism     Anemia in chronic kidney disease     Increased prolactin level (H)     Hyperphosphatemia     Hypomagnesemia     Normocytic anemia     Thrombocytopenia (H)       Current Outpatient Medications   Medication Sig Dispense Refill     HYDROcodone-acetaminophen (NORCO) 5-325 MG tablet Take 1 tablet by mouth every 6 hours as needed for pain 18 tablet 0     hydrOXYzine (ATARAX) 25 MG tablet Take 1 tablet (25 mg) by mouth every 6 hours as needed for anxiety 20 tablet 0     acetaminophen (TYLENOL) 325 MG tablet Take 2 tablets (650 mg) by mouth every 4 hours as needed for mild pain 100 tablet 1     amLODIPine (NORVASC) 10 MG tablet Take 5 mg by mouth daily       B complex-C-folic acid (NEPHROCAPS) 1 MG capsule Take 1 capsule (1 mg) by mouth daily 30 capsule 3     calcium carbonate (TUMS CHEWY BITES) 750 MG CHEW Take 750 mg by mouth 3 times daily       cinacalcet (SENSIPAR) 30 MG tablet Take 60 mg by mouth daily        diphenhydrAMINE (BENADRYL) 25 MG tablet Take 1-2 tablets (25-50 mg) by mouth every 6 hours as needed for itching or allergies 60 tablet 1     docusate sodium (COLACE) 100 MG tablet Take 100 mg by mouth daily 60 tablet 1     doxazosin (CARDURA) 2 MG tablet Take 2 mg by mouth At Bedtime       EMLA cream APPLY TO ACCESS SITE 30 TO 60 MINUTES PRIOR TO        DIALYSIS 30 g 11     EPINEPHrine (EPIPEN/ADRENACLICK/OR ANY BX GENERIC EQUIV) 0.3 MG/0.3ML  injection 2-pack Inject 0.3 mLs (0.3 mg) into the muscle as needed for anaphylaxis 0.6 mL 3     levETIRAcetam (KEPPRA) 250 MG tablet TAKE 1 AND 1/2 TABLETS DAILY. TAKE AN ADDITIONAL 250MG AFTER DIALYSIS ON MONDAY, WEDESDAY AND FRIDAY 175 tablet 0     levothyroxine (SYNTHROID/LEVOTHROID) 25 MCG tablet Take 1 tablet (25 mcg) Tuesday, Thursday, Saturday and Sunday and 1.5 tablets (37.5 mcg) on Monday, Wednesday and Friday every week. 102 tablet 2     norethindrone (MICRONOR) 0.35 MG tablet Take 1 tablet (0.35 mg) by mouth daily 28 tablet 11     ondansetron (ZOFRAN ODT) 4 MG ODT tab Take 1-2 tablets (4-8 mg) by mouth every 8 hours as needed for nausea 60 tablet 1     sevelamer (RENVELA) 800 MG tablet Take 800 mg by mouth 3 times daily (with meals) Patient takes 3-4 tablets 3 times a day and 1 with snack prn       simethicone (MYLICON) 125 MG chewable tablet Take 1 tablet (125 mg) by mouth 2 times daily 180 tablet 3     VITAMIN D, CHOLECALCIFEROL, PO Take 1,000 Units by mouth daily            Allergies   Allergen Reactions     Chlorhexidine Rash     Rash at site     Sulfa Drugs Rash     Muscle stiffness of neck     Lisinopril Swelling     angioedema     Alcohol Swabs [Isopropyl Alcohol] Rash       No results found for this or any previous visit (from the past 24 hour(s)).    Current Outpatient Medications   Medication     HYDROcodone-acetaminophen (NORCO) 5-325 MG tablet     hydrOXYzine (ATARAX) 25 MG tablet     acetaminophen (TYLENOL) 325 MG tablet     amLODIPine (NORVASC) 10 MG tablet     B complex-C-folic acid (NEPHROCAPS) 1 MG capsule     calcium carbonate (TUMS CHEWY BITES) 750 MG CHEW     cinacalcet (SENSIPAR) 30 MG tablet     diphenhydrAMINE (BENADRYL) 25 MG tablet     docusate sodium (COLACE) 100 MG tablet     doxazosin (CARDURA) 2 MG tablet     EMLA cream     EPINEPHrine (EPIPEN/ADRENACLICK/OR ANY BX GENERIC EQUIV) 0.3 MG/0.3ML injection 2-pack     levETIRAcetam (KEPPRA) 250 MG tablet     levothyroxine  "(SYNTHROID/LEVOTHROID) 25 MCG tablet     norethindrone (MICRONOR) 0.35 MG tablet     ondansetron (ZOFRAN ODT) 4 MG ODT tab     sevelamer (RENVELA) 800 MG tablet     simethicone (MYLICON) 125 MG chewable tablet     VITAMIN D, CHOLECALCIFEROL, PO     No current facility-administered medications for this visit.               Review of Systems:   ROS as described above.  Denies F/S/C/N/V/SOB/CP          Physical Exam:     Vitals:    01/15/19 1133   BP: (!) 147/93   Pulse: 114   Temp: 99.5  F (37.5  C)   TempSrc: Oral   SpO2: 96%   Weight: 56.5 kg (124 lb 9.6 oz)   Height: 1.52 m (4' 11.84\")     Body mass index is 24.46 kg/m .    GEN: patient sitting in chair.  Non-toxic appearing.  Occasionally tearful  HEEN: Head is atraumatic, normocephalic, eyes anicteric, mucous membranes moist  CV: RRR w/o M/R/G  PULM: CTAB without w/r/r  ABD: soft, nontender, bowel sounds present  NEURO: Alert and oriented x3.  No focal motor abnormalities.  Face symmetric.  PSYCH: occasionally tearful, anxious  SKIN: No rashes, bruising, or other lesions    Results for orders placed or performed in visit on 01/15/19   CBC w/ Diff and Plt ([facility])   Result Value Ref Range    WBC 9.4 4.0 - 11.0 thou/uL    RBC 2.82 (L) 3.80 - 5.40 mill/uL    Hemoglobin 8.7 (L) 12.0 - 16.0 g/dL    Hematocrit 27.1 (L) 35.0 - 47.0 %    MCV 96 80 - 100 fL    MCH 30.9 27.0 - 34.0 pg    MCHC 32.1 32.0 - 36.0 g/dL    RDW 12.2 11.0 - 14.5 %    Platelets 150 140 - 440 thou/uL    Mean Platelet Volume 9.1 8.5 - 12.5 fL    % Neutrophils 76 (H) 50 - 70 %    % Lymphocytes 10 (L) 20 - 40 %    % Monocytes 13 (H) 2 - 10 %    % Eosinophils 1 0 - 6 %    % Basophils 0 0 - 2 %    Neutrophils (Absolute) 7.1 2.0 - 7.7 thou/uL    Lymphs (Absolute) 1.0 0.8 - 4.4 thou/uL    Monocytes(Absolute) 1.2 (H) 0.0 - 0.9 thou/uL    Eos (Absolute) 0.1 0.0 - 0.4 thou/uL    Baso (Absolute) 0.0 0.0 - 0.2 thou/uL    Narrative    Test performed by:  [facility] [address] [place] " [place] [address]   C-Reactive Protein ([facility])   Result Value Ref Range    C-Reactive Protein 11.2 (H) 0.0 - 0.8 mg/dL    Narrative    Test performed by:  [facility] [address] [place] [address]   Comprehensive Metabolic ([facility]) - Results > 1 hr   Result Value Ref Range    Sodium 137 136 - 145 mmol/L    Potassium 3.8 3.5 - 5.0 mmol/L    Chloride 93 (L) 98 - 107 mmol/L    CO2, Total 26 22 - 31 mmol/L    Anion Gap 18 5 - 18 mmol/L    Glucose 101 70 - 125 mg/dL    Urea Nitrogen 26 (H) 8 - 22 mg/dL    Creatinine 9.19 (HH) 0.60 - 1.10 mg/dL    GFR Estimate If Black 6 (L) >60 mL/min/1.73m2    GFR Estimate 5 (L) >60 mL/min/1.73m2    Bilirubin Total 0.9 0.0 - 1.0 mg/dL    Calcium 10.9 (H) 8.5 - 10.5 mg/dL    Protein Total 7.3 6.0 - 8.0 g/dL    Albumin 3.5 3.5 - 5.0 g/dL    Alkaline Phosphatase 56 45 - 120 U/L    AST (SGOT) 8 0 - 40 U/L    ALT (SGPT) <9 0 - 45 U/L    Narrative    Test performed by:  [facility] [address] [place] [address]  Fasting Glucose reference range is 70-99 mg/dL per  American Diabetes Association (ADA) guidelines.   Erythrocyte Sed Rate ([facility])   Result Value Ref Range    Sed Rate 107 (H) 0 - 20 mm/hr    Narrative    Test performed by:  [facility] [address] [place] [address]   Beta-HCG Quantitative ([facility])   Result Value Ref Range    Beta hCG, Quantitative <2 0 - 4 mlU/mL    Narrative    Test performed by:  [facility] [address] [place] [address]  Non-pregnant female . . . . . . . . . . . . . 0-4 (<5) mIU/mL  Equivocal result for early pregnancy . . . . 5-24 mIU/mL  Pregnant Female:  -------------------------------------------------------------  Weeks Post LMP Approximate HCG range(mIU/mL)  (Last Menstrual Period)range  -------------------------------------------------------------  3-4 Weeks . . . . . . . 9- 130 mIU/mL  4-5 Weeks . . . . . . . 75- 2,600 mIU/mL  5-6 Weeks . . . . . . 850- 20,800  mIU/mL  6-7 Weeks . . . . . 4,000-100,200 mIU/mL  7-12 Weeks . . . . . 11,500-289,000 mIU/mL  12-16 Weeks . . . . . 18,300-137,000 mIU/mL  16-29 Weeks. . . . . . 1,400- 53,000 mIU/mL  (2nd Trimester)  29-41 Weeks. . . . . . . 940- 60,000 mIU/mL  (3rd Trimester)  INTERPRETIVE NOTE:  For diagnostic purposes, hCG results should be used  conjunction with other data.  If the hCG level is inconsistent with clinical evidence,  results should be confirmed by an alternate hCG method.  This assay is approved for use in the early detection  of pregnancy only. It is not approved for any other  uses such as tumor marker screening or monitoring.  Beta HCG ranges were updated 2/2007 to reflect  methodology referencing to the 4th World Health  Organization (WHO) International Standard.       Assessment and Plan         1. Fever, chills, myalgias: previously diagnosed as influenza like illness, but symptoms have not been improving with time.  Currently concerning for infection: either viral or possibly blood stream.  Cannot exclude bacterial infection.  I completed a CXR in clinic today and there is no obvious infiltrate consistent with pneumonia.  Exam also inconsistent with this. Radiology read + small bilat infiltrates which I think can be explained by volume status.  Patient due for dialysis tomorrow.   Labs notable for normal white count but significantly elevated inflammatory markers.  At this time would like to obtain a blood culture (see note above) to rule out bloodstream infection.  Monitor for other evidence infection.  If no infection found should consider rheumatologic etiology.  Patient has been intstructed to go to ED if sx worsen before return visit this Friday 1/18/19.  If no evidence infection could consider trial of steroids: additional rheumatologic labs and blood culture should be available through [employer] at the time of future visit.  I will follow as well  - CBC w/ Diff and Plt ([facility])  - C-Reactive  Protein ([facility])  - Comprehensive Metabolic ([facility]) - Results > 1 hr  - Erythrocyte Sed Rate ([facility])  - HYDROcodone-acetaminophen (NORCO) 5-325 MG tablet; Take 1 tablet by mouth every 6 hours as needed for pain  Dispense: 18 tablet; Refill: 0    4. Vaginal bleeding  - Beta[facility]HCG Quantitative [facility])    5. Panic attack/anxiety: related to known illness and recent undiagnosed sx  - hydrOXYzine (ATARAX) 25 MG tablet; Take 1 tablet (25 mg) by mouth every 6 hours as needed for anxiety  Dispense: 20 tablet; Refill: 0    Greater than 45 minutes spent on direct evaluation of symptoms (fever, chill, myalgias), coordination of care, and interpretation of results    Options for treatment and follow-up care were reviewed with the patient and/or guardian. Mona Kristy and/or guardian engaged in the decision making process and verbalized understanding of the options discussed and agreed with the final plan.    Macarena Bowen MD

## 2019-01-15 NOTE — LETTER
RETURN TO WORK/SCHOOL FORM    1/15/2019    Re: Mona Wagner  1990      To Whom It May Concern:     Mona Wagner is acutely ill and unable to work.  Has been seen by healthcare providers 1/4/19, 1/8/19, and today.  Next visit scheduled 1/22/19    Patient currently unable to work  Please excuse from work 1/4/19-1/22/19 at which time I will re-evaluate for ability to return to work.       Macarena Bowen MD  1/15/2019 12:39 PM

## 2019-01-16 ENCOUNTER — TELEPHONE (OUTPATIENT)
Dept: FAMILY MEDICINE | Facility: CLINIC | Age: 29
End: 2019-01-16

## 2019-01-16 ENCOUNTER — AMBULATORY - HEALTHEAST (OUTPATIENT)
Dept: FAMILY MEDICINE | Facility: CLINIC | Age: 29
End: 2019-01-16

## 2019-01-16 DIAGNOSIS — R50.9 FEVER, UNSPECIFIED FEVER CAUSE: ICD-10-CM

## 2019-01-16 NOTE — TELEPHONE ENCOUNTER
Received phone call from Dr Bowen who requests triage contact patient to come in for additional blood draw for blood culture (specimen drawn from yesterday can not be used, must be different tubing to perform blood culture).     Called Mona, she is in the middle of her dialysis with a remaining two hours left. Called Dr Bowen to inform her of this information. Dialysis center phone number [phone] given to Dr Bowen, she will call them to have blood cultures drawn there. RLbasilio RN    Updated symptoms reported by Mona, no longer having fevers/chills since yesterday. This morning continues to be fatigue and experiencing some shortness of breath. Mariana WADSWORTH

## 2019-01-16 NOTE — TELEPHONE ENCOUNTER
11:49 PM  Returned call from lab regarding critical result:  -Creatinine 9.19    Patient is on dialysis MWF and appears this is within her normal range on chart review. Other chemistry reviewed: BUN just above normal at 26. Potassium normal at 3.8.      Did note elevated ESR and CRP from clinic visit as well, though normal WBC. May reflect viral illness as noted in visit diagnoses.     Routed to PCP Dr. Bowen.     Benjamin Rosenstein, MD, MA  [facility] [place]  P: [phone]

## 2019-01-16 NOTE — TELEPHONE ENCOUNTER
After Dr Bowen had made contact with [facility], they can not draw blood culture as this lab is not related to dialysis. Dr Bowen will place order at [facility]. Mona instructed to go to [facility] for blood draw. She will plan on going tomorrow morning to outpatient lab.     An appt for follow up in clinic has been scheduled as Dr Bowen had requested, for Friday, 1/18/2019 with Dr Britt. Mariana WADSWORTH

## 2019-01-17 ENCOUNTER — AMBULATORY - HEALTHEAST (OUTPATIENT)
Dept: LAB | Facility: HOSPITAL | Age: 29
End: 2019-01-17

## 2019-01-17 ENCOUNTER — MYC MEDICAL ADVICE (OUTPATIENT)
Dept: FAMILY MEDICINE | Facility: CLINIC | Age: 29
End: 2019-01-17

## 2019-01-17 DIAGNOSIS — R50.9 FEVER, UNSPECIFIED FEVER CAUSE: ICD-10-CM

## 2019-01-17 LAB — TSH SERPL DL<=0.005 MIU/L-ACNC: 2.69 UIU/ML (ref 0.3–5)

## 2019-01-18 ENCOUNTER — OFFICE VISIT (OUTPATIENT)
Dept: FAMILY MEDICINE | Facility: CLINIC | Age: 29
End: 2019-01-18
Payer: MEDICARE

## 2019-01-18 VITALS
SYSTOLIC BLOOD PRESSURE: 147 MMHG | HEART RATE: 105 BPM | WEIGHT: 122.2 LBS | BODY MASS INDEX: 23.99 KG/M2 | HEIGHT: 60 IN | TEMPERATURE: 98 F | OXYGEN SATURATION: 97 % | RESPIRATION RATE: 18 BRPM | DIASTOLIC BLOOD PRESSURE: 94 MMHG

## 2019-01-18 DIAGNOSIS — Z09 FOLLOW-UP EXAM: Primary | ICD-10-CM

## 2019-01-18 ASSESSMENT — MIFFLIN-ST. JEOR: SCORE: 1219.55

## 2019-01-18 NOTE — PROGRESS NOTES
"       HPI       Mona Wagner is a 28 year old female who presents for:  Chief Complaint   Patient presents with     RECHECK     Follow up  - fever/chills/ dizzines and SOB - fever and chills are gone, still shortness of breathe       Follow up of flu-like illness and fever  - patient was seen in clinic on 1/15 for evaluation of a fever  - past mhx significant for ESRD on HD (on the transplant list) and SAH with seizure disorder  - the fever was associated with myalgias, arthralgias.  - previously dx with flu-like illness over the weekend (negative rapid test at Sunday ER visit), but she was not improving as expected  - at that clinic appt CBC (hemoglobin 8.7), CMP (BUN 27) CXR unremarkable (small infiltrates thought to be d/t volume), but she had significantly elevated inflammatory markers.  - she had blood cultures drawn yesterday at [facility] (0800) and auto-immune illness was considered  Update  - after Tuesday's visit her fevers/chills resolved, however she still has some difficulty with taking a deep breath  - she feels like she cannot fully take a deep breath d/t trying to not cough because when she cough she gets pain in the ribcage.  - does not change with positions  - appetite is improved, sleep has improved, still using a walker for ambulation  - has been completing her HD runs MWF  -  is present and he feels she is improving, because when he came home from work yesterday she was still up and moving around.    ROS: Complete 6 point ROS completed and negative other than stated above.    Social: Lives at home with her          Physical Exam:     Vitals:    01/18/19 0824   BP: (!) 147/94   Pulse: 105   Resp: 18   Temp: 98  F (36.7  C)   TempSrc: Oral   SpO2: 97%   Weight: 55.4 kg (122 lb 3.2 oz)   Height: 1.53 m (5' 0.24\")     Body mass index is 23.68 kg/m .  Vital signs normal except for mildly elevated BP and mildly elevated HR    General: Alert and orientated in NAD. Pleasant and " cooperative.   Cards: RRR, no murmurs, rubs or gallops. No lower extremity edema, no erythema, no asymmetry.  Resp: clear vesicular breath sounds bilaterally, no crackles or wheezes. No increased work of breathing  Thorax: no subluxed rib, pain diffusely over right posterior rib-cage, no point tenderness  Abd; non-distended, non-tender. No rebound tenderness or involuntary guarding.  Psych: mood good, affect congruent    Assessment and Plan     1. Follow-up exam  Previous concern for possible bacterial infection given ongoing symptoms that seemed longer than normal expected flu-like illness with elevated inflammatory markers. Since then her fever/chills has resolved and she has been continuing to feel better, still with some fatigue. Main symptom today is pain in her ribcage with deep inspiration. Considered pericarditis, but does signs/symptoms to not fit with this. Also considered PE, but HR improving, non-hypoxic, no DVT signs/symptoms, and pain is located in the ribcage more superficially. Reassurance given. She has follow up with Dr. Bowen again on Tuesday (1/22)  - return precautions given which she was in understanding of  - follow up on blood culture from [facility] 1/17/19 at 0800 (no prelim result yet)  - continue to follow up with Dr. Bowen, consider additional autoimmune labs at that time vs repeating inflammatory markers.    Options for treatment and follow-up care were reviewed with the patient. Mona Kristy  engaged in the decision making process and verbalized understanding of the options discussed and agreed with the final plan.    Precepted with: MD Emerald Tineo MD (PGY3)  Pager: [phone]  [facility] Resident

## 2019-01-18 NOTE — PROGRESS NOTES
Preceptor Attestation:   Patient seen, evaluated and discussed with the resident. I have verified the content of the note, which accurately reflects my assessment of the patient and the plan of care.  Supervising Physician:Sara Bullock MD  [facility]

## 2019-01-19 LAB — ANCA IGG TITR SER IF: ABNORMAL {TITER}

## 2019-01-21 LAB
AEROBIC BLOOD CULTURE BOTTLE: ABNORMAL
ANA SER QL: 0.1 U
ANAEROBIC BLOOD CULTURE BOTTLE: ABNORMAL

## 2019-01-22 LAB
BACTERIA SPEC CULT: ABNORMAL
GRAM STAIN RESULT: ABNORMAL

## 2019-01-23 LAB
BACTERIA ISLT: ABNORMAL
ISO SUSCEPTIBILITY: ABNORMAL
SPECIMEN DESCRIPTION: ABNORMAL

## 2019-01-24 ENCOUNTER — COMMUNICATION - HEALTHEAST (OUTPATIENT)
Dept: INFECTIOUS DISEASES | Facility: CLINIC | Age: 29
End: 2019-01-24

## 2019-01-25 ENCOUNTER — TELEPHONE (OUTPATIENT)
Dept: FAMILY MEDICINE | Facility: CLINIC | Age: 29
End: 2019-01-25

## 2019-01-25 NOTE — TELEPHONE ENCOUNTER
Out going call made to f/u on recent hospital admit LMTCB.. Patient is scheduled 1/29/19 for post hospital visit.

## 2019-01-25 NOTE — TELEPHONE ENCOUNTER
Mona was discharged from hospital yesterday, 1/24/2019. Today she is at dialysis. Mona forgot to discuss new blood pressure medication Hydralazine, which she is instructed to take three times a day. Amlodipine was increased from 5 mg daily to now 10 mg once daily during hosp.  While in the hospital her blood pressure was high, 180/110. Had dialysis at hospital during her inpatient, pt reports they pulled about 6 lbs of fluid which decreased her bp to systolic 140's. In hospital Mona had refused to take her second and third doses of Hydralazine, even with morning dose of Hydralazine and Amlodipine 10 mg c/o dizziness. Mona wishes and plans to not take her Hydralazine and only take Amlodipine 10 mg until she sees Dr Bowen 1/29/2019. Has a bp cuff at home and will continue to check her bp. If bp should be higher than 140/100 she will call on call physician for further directions. Feels with side effects of dizziness she will not continue Hydralazine. Mariana WADSWORTH

## 2019-01-25 NOTE — TELEPHONE ENCOUNTER
Incoming call received from Mona.  Date of discharge: 1/24/19  Facility of discharge: [facility]  Patient concerns about condition: Concerns include wanting to discuss blood pressure medication since discharge.  Patient concerns about medications: Concerns include Hydrzlazine and Amlodpine dosages.  Full med reconciliation will be completed at clinic visit.  Patient concerns about transitioning: No, wants to review blood pressure medications  Clinic office visit appointment date: 1/29/19  Patient reminded to bring all medications (prescription and over-the-counter) to clinic appointment: Yes    Using the date of discharge as day 1, the 30th day post discharge is 2/25/19.    Patient is requesting a return phone call from Triage nurse or PCP to discuss medications.

## 2019-01-28 NOTE — TELEPHONE ENCOUNTER
Please call patient.  I think it would be best if Mona came in for visit to discuss or discussed with nursing or her nephrologist at her next dialysis run.

## 2019-01-28 NOTE — TELEPHONE ENCOUNTER
Mona is aware she will need to come in and already has a scheduled appt for 1/29/2019. 1/28/2019 she has dialysis, prefer not to come in this day. Mariana WADSWORTH

## 2019-01-29 ENCOUNTER — OFFICE VISIT (OUTPATIENT)
Dept: FAMILY MEDICINE | Facility: CLINIC | Age: 29
End: 2019-01-29
Payer: MEDICARE

## 2019-01-29 VITALS
BODY MASS INDEX: 22.97 KG/M2 | RESPIRATION RATE: 18 BRPM | HEIGHT: 60 IN | SYSTOLIC BLOOD PRESSURE: 132 MMHG | HEART RATE: 82 BPM | DIASTOLIC BLOOD PRESSURE: 87 MMHG | OXYGEN SATURATION: 100 % | TEMPERATURE: 97.9 F | WEIGHT: 117 LBS

## 2019-01-29 DIAGNOSIS — J98.8 VIRAL RESPIRATORY ILLNESS: ICD-10-CM

## 2019-01-29 DIAGNOSIS — I33.0 ACUTE BACTERIAL ENDOCARDITIS: Primary | ICD-10-CM

## 2019-01-29 DIAGNOSIS — R14.0 BLOATED ABDOMEN: ICD-10-CM

## 2019-01-29 DIAGNOSIS — K59.00 CONSTIPATION, UNSPECIFIED CONSTIPATION TYPE: ICD-10-CM

## 2019-01-29 DIAGNOSIS — B97.89 VIRAL RESPIRATORY ILLNESS: ICD-10-CM

## 2019-01-29 RX ORDER — HYDROCODONE BITARTRATE AND ACETAMINOPHEN 5; 325 MG/1; MG/1
TABLET ORAL
Refills: 0 | Status: ON HOLD | COMMUNITY
Start: 2019-01-29 | End: 2019-08-12

## 2019-01-29 RX ORDER — LACTOBACILLUS RHAMNOSUS GG 15B CELL
1 CAPSULE, SPRINKLE ORAL 2 TIMES DAILY
Qty: 90 CAPSULE | Refills: 1 | Status: ON HOLD | OUTPATIENT
Start: 2019-01-29 | End: 2019-08-12

## 2019-01-29 RX ORDER — CINACALCET 30 MG/1
30 TABLET, FILM COATED ORAL
Status: ON HOLD | COMMUNITY
Start: 2019-01-29 | End: 2019-08-12

## 2019-01-29 RX ORDER — SEVELAMER CARBONATE 800 MG/1
800 TABLET, FILM COATED ORAL
Status: ON HOLD | COMMUNITY
Start: 2019-01-29 | End: 2019-08-04

## 2019-01-29 RX ORDER — SIMETHICONE 125 MG
125 TABLET,CHEWABLE ORAL 4 TIMES DAILY PRN
COMMUNITY
Start: 2019-01-29 | End: 2020-01-14

## 2019-01-29 RX ORDER — ASPIRIN 81 MG
100 TABLET, DELAYED RELEASE (ENTERIC COATED) ORAL DAILY PRN
COMMUNITY
Start: 2019-01-29 | End: 2019-08-27

## 2019-01-29 ASSESSMENT — MIFFLIN-ST. JEOR: SCORE: 1190.96

## 2019-01-29 NOTE — PROGRESS NOTES
S:   Patient works as pharmacy technician.   Takes medicines QID.    120-130/70-80s.  Hydralazine held by Nephrologist.   Vancomycin slow infusion more tolerable.   Loose stools concern. Soft serve consistency. Gas pain.   Antibiotics through 2/20. Concern for lactose intolerance when offered to use yogurt for probiotic benefits.   Sensipar through [facility].     No more calcium pills now. Confused about this. No more vitamin D pill.     O:     Patient Active Problem List   Diagnosis     ESRD (end stage renal disease) on dialysis (H)     DVT of upper extremity (deep vein thrombosis) (H)     Seizure disorder (H)     HTN (hypertension)     Other general symptoms(780.99)     Galactorrhea     Acquired hypothyroidism     Anemia in chronic kidney disease     Increased prolactin level (H)     Hyperphosphatemia     Hypomagnesemia     Normocytic anemia     Thrombocytopenia (H)         A/P:   # Medication reconciliation and management: Very knowledgeable about medicines and has good system for management. Complicated schedule, likely opportunities for simplification but deferred today as patient successful with multiple daily administrations. Updated medication list.     # BP: BPs reported at goal without use of Hydralazine. Working with specialist for management. Doxazosin not necessarily ideal agent for use in woman of child bearing age, however on appropriate contraception. Also has significant side effect burden, from patient report sound as could be contributing to malaise/tiredness, however ESRD and infection comorbidities could also be contributing. No changes today.     # ID: Long term Vancomycin course planned. Concern for risk of C. difficile. Recommended probiotics for primary prevention given lactose intolerance.     # ESRD: Discussed Calcium / vitamin D questions with Dr. Bowen. Patient had high Ca inpatient, reason for discontinuation.     Options for treatment and/or follow-up care were reviewed with the  patient. Mona was engaged and actively involved in the decision making process. She verbalized understanding of the options discussed and was satisfied with the final plan. Patient was provided with written instructions/medication list via AVS.    Dr. Bowen was provided our recommendations in clinic today and  was available for supervision during this visit and is the authorizing prescriber for this visit through the pharmacist collaborative practice agreement.    Thank you for the opportunity to participate in the care of this patient.  Aurora Lynn, Pharm.D.  [facility]: [phone]

## 2019-01-29 NOTE — PATIENT INSTRUCTIONS
STOP the Calcium and Vitamin D    Please continue to use Simethicone for gas related cramps caused by your antibiotic    I sent you some Culturelle probiotic to help with your gut health while taking vancomycin.     If you need to start taking hydroxyzine I want you to let me know.      Come back in 6 weeks to see me.

## 2019-01-29 NOTE — LETTER
RETURN TO WORK/SCHOOL FORM    1/29/2019    Re: Mona Wagner  1990      To Whom It May Concern:     Mona Wagner  Is a patient in my care.  She was admitted to the hospital for acute endocarditis.  This requires 6 weeks of IV antibiotic treatment.  She was also subsequently found to have significant anemia which affects physical stamina.     Patient is unable to return to work until she has completed her course of antibiotic therapy and has been medically cleared.    I will re-assess patient at the end of February and will assess ability to return to work at that time.       Macarena Bowen MD  1/29/2019 11:35 AM

## 2019-02-05 NOTE — PROGRESS NOTES
"  Hospitalization Follow-up Visit         [facility]       Hospital Follow-up Visit:    Hospital:  [facility]   Date of Admission: 1/18/2019  Date of Discharge: 1/24/19  Reason(s) for Admission: infective endocarditis            Problems taking medications regularly:  None       Post Discharge Medication Reconciliation: discharge medications reconciled, continue medications without change.       Problems adhering to non-medication therapy:  None       Medications reviewed by: by PharmD and myself.  Currently receiving IV abx.  This has been previously coordinated    Summary of hospitalization:  [facility] discharge summary reviewed  Diagnostic Tests/Treatments reviewed.  Follow up needed: repeat ECHO as coordinated by Infectious disease  Other Healthcare Providers Involved in Patient s Care:         transfusion services  Update since discharge: improved.   Plan of care communicated with patient                     Review of Systems:   CONSTITUTIONAL: no fatigue, no unexpected change in weight  SKIN: no worrisome rashes, no worrisome moles, no worrisome lesions  EYES: no acute vision problems or changes  ENT: no ear problems, no mouth problems, no throat problems  RESP: no significant cough, no shortness of breath  CV: no chest pain, no palpitations, no new or worsening peripheral edema  GI: no nausea, no vomiting, no constipation, no diarrhea            Physical Exam:     Vitals:    01/29/19 1010   BP: 132/87   Pulse: 82   Resp: 18   Temp: 97.9  F (36.6  C)   TempSrc: Oral   SpO2: 100%   Weight: 53.1 kg (117 lb)   Height: 1.522 m (4' 11.92\")     Body mass index is 22.91 kg/m .    GENERAL:: healthy, alert and no distress  EYES: Eyes grossly normal to inspection, extraocular movements - intact, and PERRL  NECK: no tenderness, no adenopathy, no asymmetry, no masses, no stiffness; thyroid- normal to palpation  RESP: lungs clear to auscultation - no rales, no rhonchi, no wheezes  CV: regular rates and rhythm, normal S1 " S2, no S3 or S4 and no murmur, no click or rub -  ABDOMEN: soft, no tenderness, no  hepatosplenomegaly, no masses, normal bowel sounds  MS: extremities- no gross deformities noted, no edema  PSYCH: Alert and oriented times 3; speech- coherent , normal rate and volume; able to articulate logical thoughts, able to abstract reason, no tangential thoughts, no hallucinations or delusions, affect- normal         Results:     None today    ECHO: hospital  Echo Transesophageal1/23/2019  [email or web address]  Component Name Value Ref Range   BSA 1.53 m2   Hieght 60 in   Weight 1,939.2 lbs   /89 mmHg    bpm   Height 60.0 in   Weight 121 lbs   Echo LVEF Estimated 70 %   Other Result Information   This result has an attachment that is not available.   Result Narrative     Left ventricle ejection fraction is normal. The estimated left   ventricular ejection fraction is 70%.    Normal right ventricular size and systolic function.    Small pericardial effusion.    Pulmonic Valve: There is a 8 mm x 12 mm mobile density present in the   RVOT near base of pulmonic valve most consistent with vegitation.    No previous study for comparison.    Right Ventricle: Normal right ventricular size and systolic function.    Normal left ventricular size and systolic function.     Care Everywhere Result Report  Bacterial Identification(IDC)1/23/2019  [email or web address]  Component Name Value Ref Range   Specimen Description Blood (A)   Comment: Special Requests None     Isolate SEE NOTES (A)   Comment:  Culture                  Streptococcus mitis isolated  Report status            FINAL 01/23/2019     ISO Susceptibility Streptococcus mitis isolated (A)   Comment:  Organism:                Streptococcus mitis isolated  Method                   LINDA Gram Pos Panel  Ampicillin               <=0.06 Susceptible  Penicillin               <=0.03 Susceptible  Vancomycin               0.5 Susceptible  Cefotaxime               <=0.25  Susceptible  Ceftriaxone              <=0.25 Susceptible  Erythromycin             <=0.06 Susceptible  Clindamycin              <=0.06 Susceptible    PERFORMED AT  [facility]  [address] [place] [address]          Assessment and Plan      Mona was seen today for hospital f/u and medication reconciliation.    Diagnoses and all orders for this visit:    Acute bacterial endocarditis  -     Lactobacillus Rhamnosus, GG, ([employer] & WELLNESS) capsule; Take 1 capsule by mouth 2 times daily    Constipation, unspecified constipation type    Viral respiratory illness    Bloated abdomen      NOTES:   Symptomatically improving from infectious standpoint.  Connected to ID who is coordinating vancomycin and repeat echo.  Patient has not questions regarding this    HTN:  Some adjustments made to dosing in hospital.  Patient not currently following discharge instructions as written.  Currently holding hydralaziine which has been discussed with nephrology.  Did not feel well on hydralazine    Anxiety:  Currently improved.    WIll monitor closely      E&M code to be billed if TCM cannot be: 08797    Type of decision making: High complexity (80588)      Options for treatment and follow-up care were reviewed with the patient  Mona Wagner   engaged in the decision making process and verbalized understanding of the options discussed and agreed with the final plan.      Macarena Bowen MD

## 2019-02-12 ENCOUNTER — COMMUNICATION - HEALTHEAST (OUTPATIENT)
Dept: SCHEDULING | Facility: CLINIC | Age: 29
End: 2019-02-12

## 2019-02-12 ENCOUNTER — OFFICE VISIT - HEALTHEAST (OUTPATIENT)
Dept: INFECTIOUS DISEASES | Facility: CLINIC | Age: 29
End: 2019-02-12

## 2019-02-12 ENCOUNTER — DOCUMENTATION ONLY (OUTPATIENT)
Dept: TRANSPLANT | Facility: CLINIC | Age: 29
End: 2019-02-12

## 2019-02-12 ENCOUNTER — AMBULATORY - HEALTHEAST (OUTPATIENT)
Dept: LAB | Facility: CLINIC | Age: 29
End: 2019-02-12

## 2019-02-12 DIAGNOSIS — I33.0 SUBACUTE BACTERIAL ENDOCARDITIS: ICD-10-CM

## 2019-02-12 DIAGNOSIS — A09 DIARRHEA OF INFECTIOUS ORIGIN: ICD-10-CM

## 2019-02-12 LAB
ANION GAP SERPL CALCULATED.3IONS-SCNC: 11 MMOL/L (ref 5–18)
BASOPHILS # BLD AUTO: 0 THOU/UL (ref 0–0.2)
BASOPHILS NFR BLD AUTO: 0 % (ref 0–2)
BUN SERPL-MCNC: 31 MG/DL (ref 8–22)
C DIFF TOX B STL QL: NEGATIVE
C REACTIVE PROTEIN LHE: 1.4 MG/DL (ref 0–0.8)
CALCIUM SERPL-MCNC: 10.7 MG/DL (ref 8.5–10.5)
CHLORIDE BLD-SCNC: 95 MMOL/L (ref 98–107)
CO2 SERPL-SCNC: 34 MMOL/L (ref 22–31)
CREAT SERPL-MCNC: 8.62 MG/DL (ref 0.6–1.1)
EOSINOPHIL # BLD AUTO: 0.2 THOU/UL (ref 0–0.4)
EOSINOPHIL NFR BLD AUTO: 4 % (ref 0–6)
ERYTHROCYTE [DISTWIDTH] IN BLOOD BY AUTOMATED COUNT: 12.2 % (ref 11–14.5)
GFR SERPL CREATININE-BSD FRML MDRD: 6 ML/MIN/1.73M2
GLUCOSE BLD-MCNC: 68 MG/DL (ref 70–125)
HCT VFR BLD AUTO: 29.6 % (ref 35–47)
HGB BLD-MCNC: 10 G/DL (ref 12–16)
LYMPHOCYTES # BLD AUTO: 0.9 THOU/UL (ref 0.8–4.4)
LYMPHOCYTES NFR BLD AUTO: 23 % (ref 20–40)
MCH RBC QN AUTO: 32.4 PG (ref 27–34)
MCHC RBC AUTO-ENTMCNC: 33.9 G/DL (ref 32–36)
MCV RBC AUTO: 96 FL (ref 80–100)
MONOCYTES # BLD AUTO: 0.3 THOU/UL (ref 0–0.9)
MONOCYTES NFR BLD AUTO: 7 % (ref 2–10)
NEUTROPHILS # BLD AUTO: 2.7 THOU/UL (ref 2–7.7)
NEUTROPHILS NFR BLD AUTO: 66 % (ref 50–70)
PLATELET # BLD AUTO: 173 THOU/UL (ref 140–440)
PMV BLD AUTO: 6.7 FL (ref 7–10)
POTASSIUM BLD-SCNC: 4.5 MMOL/L (ref 3.5–5)
RBC # BLD AUTO: 3.09 MILL/UL (ref 3.8–5.4)
RIBOTYPE 027/NAP1/BI: NORMAL
SODIUM SERPL-SCNC: 140 MMOL/L (ref 136–145)
WBC: 4.1 THOU/UL (ref 4–11)

## 2019-02-12 ASSESSMENT — MIFFLIN-ST. JEOR: SCORE: 1192.64

## 2019-02-13 ENCOUNTER — TEAM CONFERENCE (OUTPATIENT)
Dept: TRANSPLANT | Facility: CLINIC | Age: 29
End: 2019-02-13

## 2019-02-13 NOTE — TELEPHONE ENCOUNTER
TEAM CONFERENCE:     ATTENDEES: Sergio Johnson Riad, Schumaker, Coordinators, Social Work, Pharmacy, Finance, and Dietary    DISCUSSION: Patient notified coordinator of hospitalization with diagnosis of endocarditis in January at [facility]. Positive blood cultures 1/17/19. She is seeing Dr. Britt for follow up and is expected to be done with antibiotic the end of February. She will contact me with her clearance.     OUTCOME: [employer]

## 2019-02-13 NOTE — TELEPHONE ENCOUNTER
Called me to say that she had/has endocarditis and is out of the hospital, however not cleared yet. She is wondering if living donors can still be worked up.   I replied that yes, she can have living donors come in for her.     She will notify me when she is cleared and off antibiotics.

## 2019-02-21 ENCOUNTER — HOSPITAL ENCOUNTER (OUTPATIENT)
Dept: CARDIOLOGY | Facility: CLINIC | Age: 29
Discharge: HOME | End: 2019-02-21
Attending: STUDENT IN AN ORGANIZED HEALTH CARE EDUCATION/TRAINING PROGRAM

## 2019-02-21 ENCOUNTER — COMMUNICATION - HEALTHEAST (OUTPATIENT)
Dept: INFECTIOUS DISEASES | Facility: CLINIC | Age: 29
End: 2019-02-21

## 2019-02-21 DIAGNOSIS — I33.0 SUBACUTE BACTERIAL ENDOCARDITIS: ICD-10-CM

## 2019-02-21 LAB
AORTIC ROOT: 3.7 CM
AORTIC VALVE MEAN VELOCITY: 145 CM/S
AV DIMENSIONLESS INDEX VTI: 0.7
AV MEAN GRADIENT: 10 MMHG
AV PEAK GRADIENT: 19.5 MMHG
AV VALVE AREA: 1.6 CM2
AV VELOCITY RATIO: 0.6
BSA FOR ECHO PROCEDURE: 1.51 M2
CV BLOOD PRESSURE: ABNORMAL MMHG
CV ECHO HEIGHT: 60 IN
CV ECHO WEIGHT: 119 LBS
DOP CALC AO PEAK VEL: 221 CM/S
DOP CALC AO VTI: 45.4 CM
DOP CALC LVOT AREA: 2.27 CM2
DOP CALC LVOT DIAMETER: 1.7 CM
DOP CALC LVOT PEAK VEL: 141 CM/S
DOP CALC LVOT STROKE VOLUME: 70.8 CM3
DOP CALCLVOT PEAK VEL VTI: 31.2 CM
EJECTION FRACTION: 58 % (ref 55–75)
FRACTIONAL SHORTENING: 41.9 % (ref 28–44)
INTERVENTRICULAR SEPTUM IN END DIASTOLE: 1.1 CM (ref 0.6–0.9)
IVS/PW RATIO: 0.8
LA AREA 1: 19.9 CM2
LA AREA 2: 16.7 CM2
LEFT ATRIUM LENGTH: 5.21 CM
LEFT ATRIUM SIZE: 3.3 CM
LEFT ATRIUM TO AORTIC ROOT RATIO: 0.89 NO UNITS
LEFT ATRIUM VOLUME INDEX: 35.9 ML/M2
LEFT ATRIUM VOLUME: 54.2 ML
LEFT VENTRICLE CARDIAC INDEX: 3.4 L/MIN/M2
LEFT VENTRICLE CARDIAC OUTPUT: 5.1 L/MIN
LEFT VENTRICLE DIASTOLIC VOLUME INDEX: 100 CM3/M2 (ref 29–61)
LEFT VENTRICLE DIASTOLIC VOLUME: 151 CM3 (ref 46–106)
LEFT VENTRICLE HEART RATE: 72 BPM
LEFT VENTRICLE MASS INDEX: 129.8 G/M2
LEFT VENTRICLE SYSTOLIC VOLUME INDEX: 42.4 CM3/M2 (ref 8–24)
LEFT VENTRICLE SYSTOLIC VOLUME: 64 CM3 (ref 14–42)
LEFT VENTRICULAR INTERNAL DIMENSION IN DIASTOLE: 4.3 CM (ref 3.8–5.2)
LEFT VENTRICULAR INTERNAL DIMENSION IN SYSTOLE: 2.5 CM (ref 2.2–3.5)
LEFT VENTRICULAR MASS: 196.1 G
LEFT VENTRICULAR OUTFLOW TRACT MEAN GRADIENT: 4 MMHG
LEFT VENTRICULAR OUTFLOW TRACT MEAN VELOCITY: 96.5 CM/S
LEFT VENTRICULAR OUTFLOW TRACT PEAK GRADIENT: 8 MMHG
LEFT VENTRICULAR POSTERIOR WALL IN END DIASTOLE: 1.4 CM (ref 0.6–0.9)
LV STROKE VOLUME INDEX: 46.9 ML/M2
MITRAL VALVE E/A RATIO: 1.3
MV AVERAGE E/E' RATIO: 10.6 CM/S
MV DECELERATION TIME: 225 MS
MV E'TISSUE VEL-LAT: 10.7 CM/S
MV E'TISSUE VEL-MED: 9.46 CM/S
MV LATERAL E/E' RATIO: 10
MV MEDIAL E/E' RATIO: 11.3
MV PEAK A VELOCITY: 82.9 CM/S
MV PEAK E VELOCITY: 107 CM/S
NUC REST DIASTOLIC VOLUME INDEX: 1908.8 LBS
NUC REST SYSTOLIC VOLUME INDEX: 60 IN
PR MAX PG: 6 MMHG
PR PEAK VELOCITY: 144 CM/S
TRICUSPID REGURGITATION PEAK PRESSURE GRADIENT: 24.8 MMHG
TRICUSPID VALVE ANULAR PLANE SYSTOLIC EXCURSION: 2.1 CM
TRICUSPID VALVE PEAK REGURGITANT VELOCITY: 249 CM/S

## 2019-02-21 ASSESSMENT — MIFFLIN-ST. JEOR: SCORE: 1192.64

## 2019-02-25 ENCOUNTER — MYC MEDICAL ADVICE (OUTPATIENT)
Dept: FAMILY MEDICINE | Facility: CLINIC | Age: 29
End: 2019-02-25

## 2019-02-25 DIAGNOSIS — Z86.79 HISTORY OF ENDOCARDITIS: Primary | ICD-10-CM

## 2019-02-26 ENCOUNTER — MYC MEDICAL ADVICE (OUTPATIENT)
Dept: NEPHROLOGY | Facility: CLINIC | Age: 29
End: 2019-02-26

## 2019-02-26 DIAGNOSIS — I15.1 HYPERTENSION SECONDARY TO OTHER RENAL DISORDERS: Primary | ICD-10-CM

## 2019-03-01 DIAGNOSIS — N18.9 ANEMIA IN CHRONIC KIDNEY DISEASE, UNSPECIFIED CKD STAGE: Primary | ICD-10-CM

## 2019-03-01 DIAGNOSIS — D63.1 ANEMIA IN CHRONIC KIDNEY DISEASE, UNSPECIFIED CKD STAGE: ICD-10-CM

## 2019-03-01 DIAGNOSIS — N18.9 ANEMIA IN CHRONIC KIDNEY DISEASE, UNSPECIFIED CKD STAGE: ICD-10-CM

## 2019-03-01 DIAGNOSIS — Z86.79 HISTORY OF ENDOCARDITIS: ICD-10-CM

## 2019-03-01 DIAGNOSIS — D63.1 ANEMIA IN CHRONIC KIDNEY DISEASE, UNSPECIFIED CKD STAGE: Primary | ICD-10-CM

## 2019-03-05 ENCOUNTER — OFFICE VISIT (OUTPATIENT)
Dept: FAMILY MEDICINE | Facility: CLINIC | Age: 29
End: 2019-03-05
Payer: MEDICARE

## 2019-03-05 VITALS
TEMPERATURE: 98.3 F | WEIGHT: 115.4 LBS | DIASTOLIC BLOOD PRESSURE: 98 MMHG | BODY MASS INDEX: 22.65 KG/M2 | RESPIRATION RATE: 16 BRPM | HEIGHT: 60 IN | OXYGEN SATURATION: 100 % | SYSTOLIC BLOOD PRESSURE: 152 MMHG | HEART RATE: 79 BPM

## 2019-03-05 DIAGNOSIS — I33.0 ACUTE BACTERIAL ENDOCARDITIS: ICD-10-CM

## 2019-03-05 DIAGNOSIS — I10 ESSENTIAL HYPERTENSION: Primary | ICD-10-CM

## 2019-03-05 DIAGNOSIS — N18.6 ESRD (END STAGE RENAL DISEASE) ON DIALYSIS (H): ICD-10-CM

## 2019-03-05 DIAGNOSIS — Z99.2 ESRD (END STAGE RENAL DISEASE) ON DIALYSIS (H): ICD-10-CM

## 2019-03-05 RX ORDER — DOXAZOSIN 1 MG/1
1 TABLET ORAL AT BEDTIME
Qty: 90 TABLET | Refills: 3 | Status: ON HOLD | OUTPATIENT
Start: 2019-03-05 | End: 2019-08-04

## 2019-03-05 RX ORDER — AMLODIPINE BESYLATE 10 MG/1
10 TABLET ORAL DAILY
Qty: 90 TABLET | Refills: 3 | Status: ON HOLD | OUTPATIENT
Start: 2019-03-05 | End: 2019-08-04

## 2019-03-05 ASSESSMENT — MIFFLIN-ST. JEOR: SCORE: 1184.95

## 2019-03-05 NOTE — LETTER
RETURN TO WORK/SCHOOL FORM    3/5/2019    Re: Mona Wagner  1990      To Whom It May Concern:     Mona Wagner is a patient in my care.  She is able to return to work for up 12 hours a week.            Macarena Bowen MD  3/5/2019 11:48 AM

## 2019-03-05 NOTE — PROGRESS NOTES
S:    Blood pressure check:  -patient presents for blood pressure check   -reports that recently blood pressure has been elevated and in the 160s/100's  -was discharged from hospital in January with hydralazine but did not take it because blood pressures were low in the context of endocarditis  -now wondering if she should be taking hydralazine    Endocarditis:  -patient with admission 1/18/19 for acute endocarditis  -because of endocarditis was taken off transplant list temporarily  -has completed course vancomycin and follow up blood culture is pending    ESRD, dialysis dependent.  Transplant list candidate:  -patient needs results blood culture sent to transplant coordinator at the [facility] and to ID when available.     Patient Active Problem List   Diagnosis     ESRD (end stage renal disease) on dialysis (H)     DVT of upper extremity (deep vein thrombosis) (H)     Seizure disorder (H)     HTN (hypertension)     Other general symptoms(780.99)     Galactorrhea     Acquired hypothyroidism     Anemia in chronic kidney disease     Increased prolactin level (H)     Hyperphosphatemia     Hypomagnesemia     Normocytic anemia     Thrombocytopenia (H)     Current Outpatient Medications   Medication     acetaminophen (TYLENOL) 325 MG tablet     amLODIPine (NORVASC) 10 MG tablet     B complex-C-folic acid (NEPHROCAPS) 1 MG capsule     cinacalcet (SENSIPAR) 30 MG tablet     diphenhydrAMINE (BENADRYL) 25 MG tablet     docusate sodium (COLACE) 100 MG tablet     doxazosin (CARDURA) 1 MG tablet     EMLA cream     EPINEPHrine (EPIPEN/ADRENACLICK/OR ANY BX GENERIC EQUIV) 0.3 MG/0.3ML injection 2-pack     HYDROcodone-acetaminophen (NORCO) 5-325 MG tablet     Lactobacillus Rhamnosus, GG, ([facility]) capsule     levETIRAcetam (KEPPRA) 250 MG tablet     levothyroxine (SYNTHROID/LEVOTHROID) 25 MCG tablet     norethindrone (MICRONOR) 0.35 MG tablet     ondansetron (ZOFRAN ODT) 4 MG ODT tab     sevelamer (RENVELA)  800 MG tablet     simethicone (MYLICON) 125 MG chewable tablet     hydrOXYzine (ATARAX) 50 MG tablet     No current facility-administered medications for this visit.      Allergies   Allergen Reactions     Chlorhexidine Rash     Rash at site     Sulfa Drugs Rash     Muscle stiffness of neck     Lisinopril Swelling     angioedema     Alcohol Swabs [Isopropyl Alcohol] Rash     O:  BP (!) 152/98   Pulse 79   Temp 98.3  F (36.8  C) (Oral)   Resp 16   Ht 1.524 m (5')   Wt 52.3 kg (115 lb 6.4 oz)   SpO2 100%   BMI 22.54 kg/m      GEN: NAD  HEENT: head atraumatic, normocephalic, moist mucous membranes, eyes anicteric  CV: RRR w/o M/R/G  PULM: CTAB  ABD: soft, non-tender, no appreciable masses, no guarding, BS present  Neuro: alert and oriented x 3, CN II-XII intact, normal gross motor movements  Psych: appropriate  Ext: no peripheral edema      A/P:    HTN:  -BP elevated today despite compliance with threapy  -discussed resuming hydralazine and patient is in agreement  -she will my chart essage me with blood pressures if too high or too low    ESRD:  -blood culture pending  -will forward on when avaiable    Endocarditis:  -tx completed  -by sx back to normal  -will send on blood culture when available    With recent illness patient concerned about working more than 15hrs a week while attending dialysis.  Letter written for work restrictions today.  Limiting toal number of work hours per week to 15.  She can message me if she would like to raise this limit.     Macarena Bowen MD

## 2019-03-05 NOTE — PATIENT INSTRUCTIONS
For your blood pressure:    HOLD Hydralazine if your blood pressure is less than 130/80    Take Hydralazine twice a day for now    If your blood pressure is regularly higher than 140/90, you can increase to three times a day    If you have questions: Call and ask for our Pharmacist Aurora          We will call with your blood culture result.  We will also send result to infectious disease.   And can contact your transplant coordinator    Continue culturelle.

## 2019-03-06 LAB
AEROBIC BLOOD CULTURE BOTTLE: NO GROWTH
ANAEROBIC BLOOD CULTURE BOTTLE: NO GROWTH

## 2019-03-07 ENCOUNTER — OFFICE VISIT (OUTPATIENT)
Dept: NEUROLOGY | Facility: CLINIC | Age: 29
End: 2019-03-07
Payer: MEDICARE

## 2019-03-07 VITALS
WEIGHT: 112.5 LBS | TEMPERATURE: 98 F | DIASTOLIC BLOOD PRESSURE: 102 MMHG | HEART RATE: 99 BPM | BODY MASS INDEX: 21.97 KG/M2 | SYSTOLIC BLOOD PRESSURE: 151 MMHG

## 2019-03-07 DIAGNOSIS — G40.109 FOCAL EPILEPSY (H): Primary | ICD-10-CM

## 2019-03-07 RX ORDER — HYDROXYZINE HYDROCHLORIDE 50 MG/1
TABLET, FILM COATED ORAL
Status: ON HOLD | COMMUNITY
Start: 2019-01-15 | End: 2019-08-04

## 2019-03-07 ASSESSMENT — PAIN SCALES - GENERAL: PAINLEVEL: NO PAIN (0)

## 2019-03-07 NOTE — PATIENT INSTRUCTIONS
Times of Days  am pm        Medication Tablet Size Number of Tablets/Capsules Total Daily Dosage    levetiracetam (T,Th, Sat, Sun)  250 mg  0  1 1/2             levetiracetam (M, W, F)  250 mg  0  2 1/2                              wk 1-4  250 mg  0  1             M, W, F  250  0  2                              wk 5-8    0  1/2            M,W,F    0  1 1/2                     Wk 9-12  0 0       M,W,F  0 1                 Wk 13 and thereafter    0          0                   Carry this with you at all times.  CONTINUE TAKING YOUR OTHER MEDICATIONS AS PREVIOUSLY DIRECTED.      * * *Do not store medications in the bathroom.  Keep medications away from children!* * *

## 2019-03-07 NOTE — LETTER
3/7/2019       RE: Mona Wagner  : 1990   MRN: [id number]      Dear Colleague,    Thank you for referring your patient, Mona Wagner, to the [facility] EPILEPSY CARE at [facility]. Please see a copy of my visit note below.    [facility]      Patient:  Mona Wagner  :  1990   Age:  28 year old   Today's Office Visit:  3/7/2019    Epilepsy Data:    The patient had 2 seizures in 2014, one at home and one in the hospital.  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  She was on warfarin since 2014 for DVT from a prior dialysis catheter site.  She was put on Dilantin initially.  She was switched to Keppra when she was first seen here, due to teratogenicity of Dilantin.  Compliant with medication.   She has a pituitary microadenoma, which is followed up by an endocrinologist.       History of Present Illness:    Mona is here for a follow up on her seizures. She did not have any seizures since her first seizure. She is taking keppra 375 mg on regular days and 375+250 mg on dialysis days.    Other issues:   - Renal failure: In the previous visit she said she was going to meet with a girl from [place] who could be a donor for kidney transplant, but it didn't go further, since she had to lose weight before doing surgery and they lost contact.   - She had endocarditis with an infection in pulmonary valve last month which was treated with AB and she is waiting for cultures now.     Current Outpatient Medications   Medication Sig Dispense Refill     acetaminophen (TYLENOL) 325 MG tablet Take 2 tablets (650 mg) by mouth every 4 hours as needed for mild pain 100 tablet 1     amLODIPine (NORVASC) 10 MG tablet Take 1 tablet (10 mg) by mouth daily 90 tablet 3     B complex-C-folic acid (NEPHROCAPS) 1 MG capsule Take 1 capsule (1 mg) by mouth daily 30 capsule 3     cinacalcet (SENSIPAR) 30 MG tablet Take 1 tablet (30 mg) by mouth daily (with dinner)        diphenhydrAMINE (BENADRYL) 25 MG tablet Take 1-2 tablets (25-50 mg) by mouth every 6 hours as needed for itching or allergies 60 tablet 1     docusate sodium (COLACE) 100 MG tablet Take 1 tablet (100 mg) by mouth daily as needed for constipation       doxazosin (CARDURA) 1 MG tablet Take 1 tablet (1 mg) by mouth At Bedtime 90 tablet 3     EMLA cream APPLY TO ACCESS SITE 30 TO 60 MINUTES PRIOR TO        DIALYSIS 30 g 11     EPINEPHrine (EPIPEN/ADRENACLICK/OR ANY BX GENERIC EQUIV) 0.3 MG/0.3ML injection 2-pack Inject 0.3 mLs (0.3 mg) into the muscle as needed for anaphylaxis 0.6 mL 3     HYDROcodone-acetaminophen (NORCO) 5-325 MG tablet Take 1 tablet by mouth at the end of dialysis run as needed for headaches  0     hydrOXYzine (ATARAX) 50 MG tablet        Lactobacillus Rhamnosus, GG, ([facility] [employer]) capsule Take 1 capsule by mouth 2 times daily 90 capsule 1     levETIRAcetam (KEPPRA) 250 MG tablet TAKE 1 AND 1/2 TABLETS DAILY. TAKE AN ADDITIONAL 250MG AFTER DIALYSIS ON MONDAY, WEDESDAY AND FRIDAY 175 tablet 0     levothyroxine (SYNTHROID/LEVOTHROID) 25 MCG tablet Take 1 tablet (25 mcg) Tuesday, Thursday, Saturday and Sunday and 1.5 tablets (37.5 mcg) on Monday, Wednesday and Friday every week. 102 tablet 2     norethindrone (MICRONOR) 0.35 MG tablet Take 1 tablet (0.35 mg) by mouth daily 28 tablet 11     ondansetron (ZOFRAN ODT) 4 MG ODT tab Take 1-2 tablets (4-8 mg) by mouth every 8 hours as needed for nausea 60 tablet 1     sevelamer (RENVELA) 800 MG tablet Take 1 tablet (800 mg) by mouth 3 times daily (with meals) and snacks       simethicone (MYLICON) 125 MG chewable tablet Take 1 tablet (125 mg) by mouth 4 times daily as needed for intestinal gas        Results for LIZZETTE PALMER (MRN [id number]) as of 3/7/2019 09:52   Ref. Range 9/28/2018 10:35   Keppra (Levetiracetam) Level Latest Ref Range: 12 - 46 ug/mL 21     Perceived AED Side Effects:  No    Medication Notes:        AED Medication  Compliance:  compliant all of the time    Review of Systems:  Lethargy / Tiredness:  No  Nausea / Vomiting:  No  Double Vision:  No  Sleepiness:  No  Depression:  No  Slowed Cognitive Function:  No  Memory Problems:  No  Poor Balance:  No  Dizziness:  No  Appetite Changes:  No  Blurred Vision:  No  Sleep Changes:  No  Behavioral Changes:  No  Skin: negative  Respiratory: No shortness of breath and No cough  Cardiovascular:recent endocarditis  Have you experienced a traumatic fall since your last visit: NO    Other Issues:    Is patient safe to drive:  Yes    Woman Care:   Patient is:    Sexually Active:  Yes  Type of Birth Control:  pill  Pregnant:  no.  Planning to become pregnant:  No  Currently Breastfeeding:  No    Exam:    BP (!) 151/102   Pulse 99   Temp 98  F (36.7  C)   Wt 112 lb 8 oz (51 kg)   Breastfeeding? No   BMI 21.97 kg/m        Wt Readings from Last 5 Encounters:   03/07/19 112 lb 8 oz (51 kg)   03/05/19 115 lb 6.4 oz (52.3 kg)   01/29/19 117 lb (53.1 kg)   01/18/19 122 lb 3.2 oz (55.4 kg)   01/15/19 124 lb 9.6 oz (56.5 kg)     General Appearance: Alert, awake, cooperative, pleasant, NAD  Gait and tandem gait: steady  Attention Span:  Normal  Language/speech: no aphasia or dysarthria  Extraocular Movements:  Normal  Coordination:  Normal FNF  Facial Strength:  Normal  Tongue Strength:  Normal  Motor Exam: normal tone, bulk and strength 5/5 bilaterally    Assessment and Plan:    Focal epilepsy, 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 No seizures since her first seizure. Has been on levetiracetam. Patient is interested in coming off the medication since she has been seizure-free almost 5 years. Her last brain MRI in May 2017 did not show any bleed or other abnormal lesions. She has no other known RFs for epilepsy. Discussed the risk of seizure-recurrence with medication withdrawal, although the possibility would be low. Instructions for medication tapering was given to the patient. She was  advised to resume her medication and let the clinic know in case she had a seizure.       - Taper levetiracetam to off gradually over 3 months as instructed.  - Call if had seizures or other concerns.  - RTC in 4 months.       As described above, I met with the patient for 25 minutes and during this time counseling was greater than 50% of the visit time.  Theresa aSpp MD                      Again, thank you for allowing me to participate in the care of your patient.      Sincerely,    Theresa Sapp MD

## 2019-03-07 NOTE — PROGRESS NOTES
[facility] Epilepsy Care Progress Note      Patient:  Mona Wagner  :  1990   Age:  28 year old   Today's Office Visit:  3/7/2019    Epilepsy Data:    The patient had 2 seizures in 2014, one at home and one in the hospital.  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  She was on warfarin since 2014 for DVT from a prior dialysis catheter site.  She was put on Dilantin initially.  She was switched to Keppra when she was first seen here, due to teratogenicity of Dilantin.  Compliant with medication.   She has a pituitary microadenoma, which is followed up by an endocrinologist.      History of Present Illness:    Mona is here for a follow up on her seizures. She did not have any seizures since her first seizure. She is taking keppra 375 mg on regular days and 375+250 mg on dialysis days.    Other issues:   - Renal failure: In the previous visit she said she was going to meet with a girl from [place] who could be a donor for kidney transplant, but it didn't go further, since she had to lose weight before doing surgery and they lost contact.   - She had endocarditis with an infection in pulmonary valve last month which was treated with AB and she is waiting for cultures now.     Current Outpatient Medications   Medication Sig Dispense Refill     acetaminophen (TYLENOL) 325 MG tablet Take 2 tablets (650 mg) by mouth every 4 hours as needed for mild pain 100 tablet 1     amLODIPine (NORVASC) 10 MG tablet Take 1 tablet (10 mg) by mouth daily 90 tablet 3     B complex-C-folic acid (NEPHROCAPS) 1 MG capsule Take 1 capsule (1 mg) by mouth daily 30 capsule 3     cinacalcet (SENSIPAR) 30 MG tablet Take 1 tablet (30 mg) by mouth daily (with dinner)       diphenhydrAMINE (BENADRYL) 25 MG tablet Take 1-2 tablets (25-50 mg) by mouth every 6 hours as needed for itching or allergies 60 tablet 1     docusate sodium (COLACE) 100 MG tablet Take 1 tablet (100 mg) by mouth daily as needed for constipation       doxazosin  (CARDURA) 1 MG tablet Take 1 tablet (1 mg) by mouth At Bedtime 90 tablet 3     EMLA cream APPLY TO ACCESS SITE 30 TO 60 MINUTES PRIOR TO        DIALYSIS 30 g 11     EPINEPHrine (EPIPEN/ADRENACLICK/OR ANY BX GENERIC EQUIV) 0.3 MG/0.3ML injection 2-pack Inject 0.3 mLs (0.3 mg) into the muscle as needed for anaphylaxis 0.6 mL 3     HYDROcodone-acetaminophen (NORCO) 5-325 MG tablet Take 1 tablet by mouth at the end of dialysis run as needed for headaches  0     hydrOXYzine (ATARAX) 50 MG tablet        Lactobacillus Rhamnosus, GG, ([facility] [employer]) capsule Take 1 capsule by mouth 2 times daily 90 capsule 1     levETIRAcetam (KEPPRA) 250 MG tablet TAKE 1 AND 1/2 TABLETS DAILY. TAKE AN ADDITIONAL 250MG AFTER DIALYSIS ON MONDAY, WEDESDAY AND FRIDAY 175 tablet 0     levothyroxine (SYNTHROID/LEVOTHROID) 25 MCG tablet Take 1 tablet (25 mcg) Tuesday, Thursday, Saturday and Sunday and 1.5 tablets (37.5 mcg) on Monday, Wednesday and Friday every week. 102 tablet 2     norethindrone (MICRONOR) 0.35 MG tablet Take 1 tablet (0.35 mg) by mouth daily 28 tablet 11     ondansetron (ZOFRAN ODT) 4 MG ODT tab Take 1-2 tablets (4-8 mg) by mouth every 8 hours as needed for nausea 60 tablet 1     sevelamer (RENVELA) 800 MG tablet Take 1 tablet (800 mg) by mouth 3 times daily (with meals) and snacks       simethicone (MYLICON) 125 MG chewable tablet Take 1 tablet (125 mg) by mouth 4 times daily as needed for intestinal gas        Results for LIZZETTE PALMER (MRN [id number]) as of 3/7/2019 09:52   Ref. Range 9/28/2018 10:35   Keppra (Levetiracetam) Level Latest Ref Range: 12 - 46 ug/mL 21     Perceived AED Side Effects:  No    Medication Notes:        AED Medication Compliance:  compliant all of the time    Review of Systems:  Lethargy / Tiredness:  No  Nausea / Vomiting:  No  Double Vision:  No  Sleepiness:  No  Depression:  No  Slowed Cognitive Function:  No  Memory Problems:  No  Poor Balance:  No  Dizziness:  No  Appetite Changes:   No  Blurred Vision:  No  Sleep Changes:  No  Behavioral Changes:  No  Skin: negative  Respiratory: No shortness of breath and No cough  Cardiovascular:recent endocarditis  Have you experienced a traumatic fall since your last visit: NO    Other Issues:    Is patient safe to drive:  Yes    Woman Care:   Patient is:    Sexually Active:  Yes  Type of Birth Control:  pill  Pregnant:  no.  Planning to become pregnant:  No  Currently Breastfeeding:  No    Exam:    BP (!) 151/102   Pulse 99   Temp 98  F (36.7  C)   Wt 112 lb 8 oz (51 kg)   Breastfeeding? No   BMI 21.97 kg/m       Wt Readings from Last 5 Encounters:   03/07/19 112 lb 8 oz (51 kg)   03/05/19 115 lb 6.4 oz (52.3 kg)   01/29/19 117 lb (53.1 kg)   01/18/19 122 lb 3.2 oz (55.4 kg)   01/15/19 124 lb 9.6 oz (56.5 kg)     General Appearance: Alert, awake, cooperative, pleasant, NAD  Gait and tandem gait: steady  Attention Span:  Normal  Language/speech: no aphasia or dysarthria  Extraocular Movements:  Normal  Coordination:  Normal FNF  Facial Strength:  Normal  Tongue Strength:  Normal  Motor Exam: normal tone, bulk and strength 5/5 bilaterally    Assessment and Plan:    Focal epilepsy, 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 No seizures since her first seizure. Has been on levetiracetam. Patient is interested in coming off the medication since she has been seizure-free almost 5 years. Her last brain MRI in May 2017 did not show any bleed or other abnormal lesions. She has no other known RFs for epilepsy. Discussed the risk of seizure-recurrence with medication withdrawal, although the possibility would be low. Instructions for medication tapering was given to the patient. She was advised to resume her medication and let the clinic know in case she had a seizure.       - Taper levetiracetam to off gradually over 3 months as instructed.  - Call if had seizures or other concerns.  - RTC in 4 months.       As described above, I met with the patient for 25  minutes and during this time counseling was greater than 50% of the visit time.  Theresa Sapp MD

## 2019-03-11 ENCOUNTER — DOCUMENTATION ONLY (OUTPATIENT)
Dept: TRANSPLANT | Facility: CLINIC | Age: 29
End: 2019-03-11

## 2019-03-11 ENCOUNTER — COMMUNICATION - HEALTHEAST (OUTPATIENT)
Dept: INFECTIOUS DISEASES | Facility: CLINIC | Age: 29
End: 2019-03-11

## 2019-03-11 DIAGNOSIS — N18.6 ESRD (END STAGE RENAL DISEASE) (H): ICD-10-CM

## 2019-03-11 DIAGNOSIS — Z76.82 ORGAN TRANSPLANT CANDIDATE: ICD-10-CM

## 2019-03-11 PROBLEM — I33.0 INFECTIVE ENDOCARDITIS: Status: ACTIVE | Noted: 2019-01-24

## 2019-03-12 DIAGNOSIS — N18.6 ESRD (END STAGE RENAL DISEASE) (H): ICD-10-CM

## 2019-03-12 DIAGNOSIS — Z76.82 ORGAN TRANSPLANT CANDIDATE: ICD-10-CM

## 2019-04-12 ENCOUNTER — MYC MEDICAL ADVICE (OUTPATIENT)
Dept: FAMILY MEDICINE | Facility: CLINIC | Age: 29
End: 2019-04-12

## 2019-04-23 ENCOUNTER — COMMUNICATION - HEALTHEAST (OUTPATIENT)
Dept: ADMINISTRATIVE | Facility: CLINIC | Age: 29
End: 2019-04-23

## 2019-04-24 ENCOUNTER — MYC MEDICAL ADVICE (OUTPATIENT)
Dept: ENDOCRINOLOGY | Facility: CLINIC | Age: 29
End: 2019-04-24

## 2019-04-24 DIAGNOSIS — E03.9 HYPOTHYROIDISM: Primary | ICD-10-CM

## 2019-04-26 DIAGNOSIS — R25.2 MUSCLE CRAMPS: ICD-10-CM

## 2019-04-26 DIAGNOSIS — N18.6 ESRD (END STAGE RENAL DISEASE) ON DIALYSIS (H): Primary | ICD-10-CM

## 2019-04-26 DIAGNOSIS — N18.5 ANEMIA IN STAGE 5 CHRONIC KIDNEY DISEASE, NOT ON CHRONIC DIALYSIS (H): Primary | ICD-10-CM

## 2019-04-26 DIAGNOSIS — D63.1 ANEMIA IN STAGE 5 CHRONIC KIDNEY DISEASE, NOT ON CHRONIC DIALYSIS (H): Primary | ICD-10-CM

## 2019-04-26 DIAGNOSIS — E03.9 HYPOTHYROIDISM: ICD-10-CM

## 2019-04-26 DIAGNOSIS — Z99.2 ESRD (END STAGE RENAL DISEASE) ON DIALYSIS (H): Primary | ICD-10-CM

## 2019-04-26 LAB
ANION GAP SERPL CALCULATED.3IONS-SCNC: 9 MMOL/L (ref 3–14)
BUN SERPL-MCNC: 78 MG/DL (ref 7–30)
CALCIUM SERPL-MCNC: 9.4 MG/DL (ref 8.5–10.1)
CHLORIDE SERPL-SCNC: 95 MMOL/L (ref 94–109)
CK SERPL-CCNC: 47 U/L (ref 30–225)
CO2 SERPL-SCNC: 31 MMOL/L (ref 20–32)
CREAT SERPL-MCNC: 14 MG/DL (ref 0.52–1.04)
GFR SERPL CREATININE-BSD FRML MDRD: 3 ML/MIN/{1.73_M2}
GLUCOSE SERPL-MCNC: 106 MG/DL (ref 70–99)
POTASSIUM SERPL-SCNC: 4.8 MMOL/L (ref 3.4–5.3)
SODIUM SERPL-SCNC: 135 MMOL/L (ref 133–144)
T4 FREE SERPL-MCNC: 1.08 NG/DL (ref 0.76–1.46)
TSH SERPL DL<=0.005 MIU/L-ACNC: 2.21 MU/L (ref 0.4–4)

## 2019-04-26 PROCEDURE — 84439 ASSAY OF FREE THYROXINE: CPT | Performed by: INTERNAL MEDICINE

## 2019-04-26 PROCEDURE — 80048 BASIC METABOLIC PNL TOTAL CA: CPT | Performed by: INTERNAL MEDICINE

## 2019-04-26 PROCEDURE — 82550 ASSAY OF CK (CPK): CPT | Performed by: INTERNAL MEDICINE

## 2019-04-26 PROCEDURE — 84443 ASSAY THYROID STIM HORMONE: CPT | Performed by: INTERNAL MEDICINE

## 2019-04-26 RX ORDER — VIT B COMPLX C/FOLIC ACID/ZINC 0.8MG-15MG
1 TABLET ORAL DAILY
Qty: 90 TABLET | Refills: 3 | Status: ON HOLD | OUTPATIENT
Start: 2019-04-26 | End: 2019-08-04

## 2019-05-10 DIAGNOSIS — Z76.82 ORGAN TRANSPLANT CANDIDATE: ICD-10-CM

## 2019-05-10 DIAGNOSIS — N18.6 ESRD (END STAGE RENAL DISEASE) (H): ICD-10-CM

## 2019-05-10 DIAGNOSIS — I10 HYPERTENSION: Primary | ICD-10-CM

## 2019-05-14 ENCOUNTER — OFFICE VISIT (OUTPATIENT)
Dept: ENDOCRINOLOGY | Facility: CLINIC | Age: 29
End: 2019-05-14
Payer: MEDICARE

## 2019-05-14 VITALS
BODY MASS INDEX: 22.97 KG/M2 | DIASTOLIC BLOOD PRESSURE: 88 MMHG | HEIGHT: 60 IN | HEART RATE: 86 BPM | SYSTOLIC BLOOD PRESSURE: 130 MMHG | WEIGHT: 117 LBS

## 2019-05-14 DIAGNOSIS — L65.9 HAIR LOSS: ICD-10-CM

## 2019-05-14 DIAGNOSIS — E22.9 PITUITARY MICROADENOMA WITH HYPERPROLACTINEMIA (H): Primary | ICD-10-CM

## 2019-05-14 DIAGNOSIS — D35.2 PITUITARY MICROADENOMA WITH HYPERPROLACTINEMIA (H): Primary | ICD-10-CM

## 2019-05-14 ASSESSMENT — ENCOUNTER SYMPTOMS
TREMORS: 0
HEARTBURN: 0
DIZZINESS: 0
LEG PAIN: 0
WEIGHT GAIN: 0
POLYDIPSIA: 0
BREAST PAIN: 0
PALPITATIONS: 0
MEMORY LOSS: 0
NIGHT SWEATS: 0
ARTHRALGIAS: 0
CONSTIPATION: 1
CHILLS: 0
SPEECH CHANGE: 0
JAUNDICE: 0
POLYPHAGIA: 0
LOSS OF CONSCIOUSNESS: 0
NUMBNESS: 0
BREAST MASS: 0
SYNCOPE: 1
BACK PAIN: 1
BOWEL INCONTINENCE: 0
WEIGHT LOSS: 0
SEIZURES: 0
WEAKNESS: 0
FATIGUE: 1
MUSCLE WEAKNESS: 1
RECTAL PAIN: 0
HEADACHES: 1
DISTURBANCES IN COORDINATION: 0
ALTERED TEMPERATURE REGULATION: 1
FEVER: 0
ORTHOPNEA: 0
DIARRHEA: 0
INCREASED ENERGY: 1
HYPERTENSION: 0
DECREASED APPETITE: 0
MYALGIAS: 1
STIFFNESS: 0
EXERCISE INTOLERANCE: 0
ABDOMINAL PAIN: 0
BLOATING: 1
NECK PAIN: 1
JOINT SWELLING: 0
HALLUCINATIONS: 0
MUSCLE CRAMPS: 1
HYPOTENSION: 1
PARALYSIS: 0
NAUSEA: 1
VOMITING: 0
LIGHT-HEADEDNESS: 1
BLOOD IN STOOL: 0
SLEEP DISTURBANCES DUE TO BREATHING: 0
TINGLING: 0

## 2019-05-14 ASSESSMENT — PAIN SCALES - GENERAL: PAINLEVEL: MILD PAIN (3)

## 2019-05-14 ASSESSMENT — MIFFLIN-ST. JEOR: SCORE: 1192.21

## 2019-05-14 NOTE — PROGRESS NOTES
Endocrinology Resident Clinic Note  Date: May 14, 2019   Follow-up for: hypothyroidism and hyperprolactinemia       HPI  Katlyn Wagner is a 27 yo woman w/ PMHx of IgA nephropathy & ESRD on dialysis, recent endocarditis, galactorrhea secondary to hyperprolactinemia &possible microadenoma, and hypothyroidism who presents for follow-up of her hypothyroidism, hyperprolactinemia, and possible pituitary microadenoma. 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     Briefly, her initial galactorrhea improved when she started thyroid-replacement therapy. Prolactin levels remained elevated. MRI at one time showed a possible 6.7-5.3mm microadenoma. Her last pituitary MRI did not show an definite sellar masses, but the study had to be done without contrast. This raised into question whether pt had active microadenoma vs hyperprolactinemia associated w/ ESRD. She never saw a neurosurgeon or experienced peripheral vision changes.     She had an episode of galactorrhea again recently where she noticed her R nipple was moist and was then able to express milky, non-blood tinged secretions from it. The secretions were similar to her previous episodes of galactorrhea. No associated breast pain or redness. Has glasses and yearly progressive decreases in visual acuity, but nothing accelerated beyond her baseline. No acute vision changes or changes in visual fields (ie having to look farther over her shoulder while driving or experiencing objects suddenly appearing in field of vision). Headaches w/ dialysis, but this is not changing.     As far as other symptoms, she does endorse hair loss since her endocarditis. Mostly from her head. Some eyebrow and eyelash loss. Does endorse some pubic hair loss when wiping/cleaning genitalia, but still has a significant amount of pubic hair. Plucks axillary hair, which has not changed. She has dry skin but normal nails. No LE swelling.  Chronic fatigue that has been present w/ dialysis. No fevers, night sweats, or chills. Has not had any return of myalgias, rigors, or other flu-like symptoms that prompted her admission for endocarditis.     She is on OCPs that extend her menses to a 56-day cycle.     She has been relisted for renal transplant after being temporarily off the list due to endocarditis. She has not had any medications changes other than Neurology down-titrating her levetiracetam. They plan to fully discontinue this medication.     REVIEW OF SYSTEMS  11 point ROS is as detailed in the HPI above, as below or negative    Answers for HPI/ROS submitted by the patient on 5/14/2019   General Symptoms: Yes  Skin Symptoms: No  HENT Symptoms: No  EYE SYMPTOMS: No  HEART SYMPTOMS: Yes  LUNG SYMPTOMS: No  INTESTINAL SYMPTOMS: Yes  URINARY SYMPTOMS: No  GYNECOLOGIC SYMPTOMS: No  BREAST SYMPTOMS: Yes  SKELETAL SYMPTOMS: Yes  BLOOD SYMPTOMS: No  NERVOUS SYSTEM SYMPTOMS: Yes  MENTAL HEALTH SYMPTOMS: No  Fatigue: Yes  Feeling hot or cold when others believe the temperature is normal: Yes  Change in or Loss of Energy: Yes  Low blood pressure: Yes  Fainting: Yes  Murmurs: Yes  Edema or swelling: Yes  Light-headedness: Yes  Nausea: Yes  Bloating: Yes  Constipation: Yes  Back pain: Yes  Muscle aches: Yes  Neck pain: Yes  Muscle cramps: Yes  Muscle weakness: Yes  Headache: Yes  Discharge: Yes        Past Medical History  Past Medical History:   Diagnosis Date     Anemia in chronic kidney disease      Dialysis patient (H)      End stage renal disease on dialysis (H)      Hypertension      Hypothyroid      Pituitary adenoma (H)        Medications  Current Outpatient Medications   Medication     acetaminophen (TYLENOL) 325 MG tablet     B Complex-C-Zn-Folic Acid (DIALYVITE 800-ZINC 15) 0.8 MG TABS     cinacalcet (SENSIPAR) 30 MG tablet     diphenhydrAMINE (BENADRYL) 25 MG tablet     docusate sodium (COLACE) 100 MG tablet     EMLA cream     Lactobacillus  Rhamnosus, GG, ([facility] [employer]) capsule     levETIRAcetam (KEPPRA) 250 MG tablet     levothyroxine (SYNTHROID/LEVOTHROID) 25 MCG tablet     multivitamin RENAL (NEPHROCAPS/TRIPHROCAPS) 1 MG capsule     norethindrone (MICRONOR) 0.35 MG tablet     ondansetron (ZOFRAN ODT) 4 MG ODT tab     sennosides (SENOKOT) 8.6 MG tablet     sevelamer (RENVELA) 800 MG tablet     simethicone (MYLICON) 125 MG chewable tablet     amLODIPine (NORVASC) 10 MG tablet     doxazosin (CARDURA) 1 MG tablet     EPINEPHrine (EPIPEN/ADRENACLICK/OR ANY BX GENERIC EQUIV) 0.3 MG/0.3ML injection 2-pack     HYDROcodone-acetaminophen (NORCO) 5-325 MG tablet     hydrOXYzine (ATARAX) 50 MG tablet     No current facility-administered medications for this visit.        Current Outpatient Medications   Medication Sig Dispense Refill     acetaminophen (TYLENOL) 325 MG tablet Take 2 tablets (650 mg) by mouth every 4 hours as needed for mild pain 100 tablet 1     B Complex-C-Zn-Folic Acid (DIALYVITE 800-ZINC 15) 0.8 MG TABS Take 1 tablet by mouth daily 90 tablet 3     cinacalcet (SENSIPAR) 30 MG tablet Take 1 tablet (30 mg) by mouth daily (with dinner)       diphenhydrAMINE (BENADRYL) 25 MG tablet Take 1-2 tablets (25-50 mg) by mouth every 6 hours as needed for itching or allergies 60 tablet 1     docusate sodium (COLACE) 100 MG tablet Take 1 tablet (100 mg) by mouth daily as needed for constipation       EMLA cream APPLY TO ACCESS SITE 30 TO 60 MINUTES PRIOR TO        DIALYSIS 30 g 11     Lactobacillus Rhamnosus, GG, ([facility]) capsule Take 1 capsule by mouth 2 times daily 90 capsule 1     levETIRAcetam (KEPPRA) 250 MG tablet TAKE 1 AND 1/2 TABLETS DAILY. TAKE AN ADDITIONAL 250MG AFTER DIALYSIS ON MONDAY, WEDESDAY AND FRIDAY 175 tablet 0     levothyroxine (SYNTHROID/LEVOTHROID) 25 MCG tablet Take 1 tablet (25 mcg) Tuesday, Thursday, Saturday and Sunday and 1.5 tablets (37.5 mcg) on Monday, Wednesday and Friday every week. 102  tablet 2     multivitamin RENAL (NEPHROCAPS/TRIPHROCAPS) 1 MG capsule Take 1 capsule by mouth daily 30 capsule 3     norethindrone (MICRONOR) 0.35 MG tablet Take 1 tablet (0.35 mg) by mouth daily 28 tablet 11     ondansetron (ZOFRAN ODT) 4 MG ODT tab Take 1-2 tablets (4-8 mg) by mouth every 8 hours as needed for nausea 60 tablet 1     sennosides (SENOKOT) 8.6 MG tablet Take 1 tablet by mouth 2 times daily as needed for constipation 180 tablet 3     sevelamer (RENVELA) 800 MG tablet Take 1 tablet (800 mg) by mouth 3 times daily (with meals) and snacks       simethicone (MYLICON) 125 MG chewable tablet Take 1 tablet (125 mg) by mouth 4 times daily as needed for intestinal gas       amLODIPine (NORVASC) 10 MG tablet Take 1 tablet (10 mg) by mouth daily (Patient not taking: Reported on 5/14/2019) 90 tablet 3     doxazosin (CARDURA) 1 MG tablet Take 1 tablet (1 mg) by mouth At Bedtime (Patient not taking: Reported on 5/14/2019) 90 tablet 3     EPINEPHrine (EPIPEN/ADRENACLICK/OR ANY BX GENERIC EQUIV) 0.3 MG/0.3ML injection 2-pack Inject 0.3 mLs (0.3 mg) into the muscle as needed for anaphylaxis (Patient not taking: Reported on 5/14/2019) 0.6 mL 3     HYDROcodone-acetaminophen (NORCO) 5-325 MG tablet Take 1 tablet by mouth at the end of dialysis run as needed for headaches  0     hydrOXYzine (ATARAX) 50 MG tablet          Allergies  Allergies   Allergen Reactions     Chlorhexidine Rash     Rash at site     Sulfa Drugs Rash     Muscle stiffness of neck     Lisinopril Swelling     angioedema     Alcohol Swabs [Isopropyl Alcohol] Rash         Family History  family history includes Cerebrovascular Disease in her father and sister; Diabetes in her sister; Hypertension in her sister; Kidney Disease in her mother and sister.    Social History  Social History     Tobacco Use     Smoking status: Never Smoker     Smokeless tobacco: Never Used   Substance Use Topics     Alcohol use: No     Alcohol/week: 0.0 oz     Drug use: No        Physical Exam  /88   Pulse 86   Ht 1.524 m (5')   Wt 53.1 kg (117 lb)   BMI 22.85 kg/m    Body mass index is 22.85 kg/m .  GENERAL: Appropriately dressed, appears well.  In no apparent distress  SKIN: +some thinning hair over the scalp; eyebrows extend over lateral corners of each eye. Skin normal color, normal temperature and texture.   EYES: PERRL, EOMI, No conjunctival icterus,  No proptosis, conjunctival redness, stare, or lid retraction  NECK: No visible masses. No palpable adenopathy, or masses.   THYROID: no thyromegaly or palpable nodules   RESP: Lungs clear to auscultation bilaterally. No wheezes, crackles, or rhonchi  CARDIAC: +3/6 systolic murmur most prominent over L sternal border. Loud S2. Regular rate and rhythm, normal S1/S2. No S3/S4.   : pubic hair Diogo stage 4    NEURO: awake, alert, responds appropriately to questions. Cranial nerves intact. Moves all extremities.Gait normal.  No tremor of the outstretched hand.   EXTREMITIES: No clubbing, cyanosis, or edema.    DATA REVIEW  TSH   Date Value Ref Range Status   04/26/2019 2.21 0.40 - 4.00 mU/L Final     T4 Total   Date Value Ref Range Status   07/06/2017 9.6 4.5 - 13.9 ug/dL Final     T4 Free   Date Value Ref Range Status   04/26/2019 1.08 0.76 - 1.46 ng/dL Final       ASSESSMENT/PLAN:   Katlyn Wagner is a 27 yo woman w/ PMHx of IgA nephropathy & ESRD on dialysis, recent endocarditis, galactorrhea secondary to hyperprolactinemia, possible microadenoma, and hypothyroidism who presents for follow-up of her hypothyroidism, hyperprolactinemia, and possible pituitary microadenoma. She has recurrent galactorrhea.     1. Galactorrhea and hyperprolactinemia  Pt's first episode of galactorrhea improved w/ thyroid hormone replacement. However, she had an additional episode. Prolactin levels may be higher. It does not appear she has been over-stimulating her breast. Her MRI had not changed and in 2017 did not show definite evidence of a  microadenoma, though contrast could not be used to fully evaluate this. However contrast use needs to be carefully evaluated considering she has reduced renal function. It was thought that her hyperprolactinemia may more likely be due to her ESRD. However, given the recurrence of galactorrhea, she needs repeat evaluation.   - prolactin level today   - will likely obtain MRI pituitary if higher than previous.  Will discuss contrast use with nephrology   - minimize stimulation of the nipples; do not attempt to frequently express secretions   - may need consideration of cabergoline pending findings   - RTC in 6 months     2. Hypothyroidism  TSH stable on current regimen.   - continue levothyroxine 25mcg 4x weekly and 37.5mcg 3x weekly   - follow-up in 6 months     3. Hair loss   Thi is a new problem.  Possible etiologies include telogen effluvium after endocarditis vs iron deficiency vs androgen excess. Unlikely thyroid-related as pt is biochemically euthyroid. Hypoadrogenism considered given report of pubic hair loss. This would need to be affecting her adrenal glands as well, however there is no evidence of electrolyte abnormalities to suggest primary adrenal insufficiency.  - check free/total testosterone, SHBG, androstenedione, and DHEA-S  - if continued scalp hair loss and the aforementioned normal, pt may benefit from seeing dermatology     4. Oligomenorrhea   Pt on OCP that purposely extends her menstrual cycles to 56 days.       Pt seen and discussed w/ Dr. Coles.    Wilton Chen MD  Internal Medicine, PGY-2  [phone]    ATTENDING NOTE    I have seen and examined the patient, reviewed and edited the resident's note, and agree with the plan of care.    Nidhi Coles MD PhD    Division of Endocrinology and Diabetes

## 2019-05-14 NOTE — LETTER
5/14/2019     RE: Mona Wagner  [address]  [place] [address]     Dear Colleague,    Thank you for referring your patient, Mona Wagner, to the [facility] ENDOCRINOLOGY at [facility]. Please see a copy of my visit note below.  Endocrinology Resident Clinic Note  Date: May 14, 2019   Follow-up for: hypothyroidism and hyperprolactinemia       HPI  Katlyn Wagner is a 27 yo woman w/ PMHx of IgA nephropathy & ESRD on dialysis, recent endocarditis, galactorrhea secondary to hyperprolactinemia &possible microadenoma, and hypothyroidism who presents for follow-up of her hypothyroidism, hyperprolactinemia, and possible pituitary microadenoma. 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     Briefly, her initial galactorrhea improved when she started thyroid-replacement therapy. Prolactin levels remained elevated. MRI at one time showed a possible 6.7-5.3mm microadenoma. Her last pituitary MRI did not show an definite sellar masses, but the study had to be done without contrast. This raised into question whether pt had active microadenoma vs hyperprolactinemia associated w/ ESRD. She never saw a neurosurgeon or experienced peripheral vision changes.     She had an episode of galactorrhea again recently where she noticed her R nipple was moist and was then able to express milky, non-blood tinged secretions from it. The secretions were similar to her previous episodes of galactorrhea. No associated breast pain or redness. Has glasses and yearly progressive decreases in visual acuity, but nothing accelerated beyond her baseline. No acute vision changes or changes in visual fields (ie having to look farther over her shoulder while driving or experiencing objects suddenly appearing in field of vision). Headaches w/ dialysis, but this is not changing.     As far as other symptoms, she does endorse hair loss since her endocarditis.  Mostly from her head. Some eyebrow and eyelash loss. Does endorse some pubic hair loss when wiping/cleaning genitalia, but still has a significant amount of pubic hair. Plucks axillary hair, which has not changed. She has dry skin but normal nails. No LE swelling. Chronic fatigue that has been present w/ dialysis. No fevers, night sweats, or chills. Has not had any return of myalgias, rigors, or other flu-like symptoms that prompted her admission for endocarditis.     She is on OCPs that extend her menses to a 56-day cycle.     She has been relisted for renal transplant after being temporarily off the list due to endocarditis. She has not had any medications changes other than Neurology down-titrating her levetiracetam. They plan to fully discontinue this medication.     REVIEW OF SYSTEMS  11 point ROS is as detailed in the HPI above, as below or negative    Answers for HPI/ROS submitted by the patient on 5/14/2019   General Symptoms: Yes  Skin Symptoms: No  HENT Symptoms: No  EYE SYMPTOMS: No  HEART SYMPTOMS: Yes  LUNG SYMPTOMS: No  INTESTINAL SYMPTOMS: Yes  URINARY SYMPTOMS: No  GYNECOLOGIC SYMPTOMS: No  BREAST SYMPTOMS: Yes  SKELETAL SYMPTOMS: Yes  BLOOD SYMPTOMS: No  NERVOUS SYSTEM SYMPTOMS: Yes  MENTAL HEALTH SYMPTOMS: No  Fatigue: Yes  Feeling hot or cold when others believe the temperature is normal: Yes  Change in or Loss of Energy: Yes  Low blood pressure: Yes  Fainting: Yes  Murmurs: Yes  Edema or swelling: Yes  Light-headedness: Yes  Nausea: Yes  Bloating: Yes  Constipation: Yes  Back pain: Yes  Muscle aches: Yes  Neck pain: Yes  Muscle cramps: Yes  Muscle weakness: Yes  Headache: Yes  Discharge: Yes        Past Medical History  Past Medical History:   Diagnosis Date     Anemia in chronic kidney disease      Dialysis patient (H)      End stage renal disease on dialysis (H)      Hypertension      Hypothyroid      Pituitary adenoma (H)        Medications  Current Outpatient Medications   Medication      acetaminophen (TYLENOL) 325 MG tablet     B Complex-C-Zn-Folic Acid (DIALYVITE 800-ZINC 15) 0.8 MG TABS     cinacalcet (SENSIPAR) 30 MG tablet     diphenhydrAMINE (BENADRYL) 25 MG tablet     docusate sodium (COLACE) 100 MG tablet     EMLA cream     Lactobacillus Rhamnosus, GG, ([facility]) capsule     levETIRAcetam (KEPPRA) 250 MG tablet     levothyroxine (SYNTHROID/LEVOTHROID) 25 MCG tablet     multivitamin RENAL (NEPHROCAPS/TRIPHROCAPS) 1 MG capsule     norethindrone (MICRONOR) 0.35 MG tablet     ondansetron (ZOFRAN ODT) 4 MG ODT tab     sennosides (SENOKOT) 8.6 MG tablet     sevelamer (RENVELA) 800 MG tablet     simethicone (MYLICON) 125 MG chewable tablet     amLODIPine (NORVASC) 10 MG tablet     doxazosin (CARDURA) 1 MG tablet     EPINEPHrine (EPIPEN/ADRENACLICK/OR ANY BX GENERIC EQUIV) 0.3 MG/0.3ML injection 2-pack     HYDROcodone-acetaminophen (NORCO) 5-325 MG tablet     hydrOXYzine (ATARAX) 50 MG tablet     No current facility-administered medications for this visit.        Current Outpatient Medications   Medication Sig Dispense Refill     acetaminophen (TYLENOL) 325 MG tablet Take 2 tablets (650 mg) by mouth every 4 hours as needed for mild pain 100 tablet 1     B Complex-C-Zn-Folic Acid (DIALYVITE 800-ZINC 15) 0.8 MG TABS Take 1 tablet by mouth daily 90 tablet 3     cinacalcet (SENSIPAR) 30 MG tablet Take 1 tablet (30 mg) by mouth daily (with dinner)       diphenhydrAMINE (BENADRYL) 25 MG tablet Take 1-2 tablets (25-50 mg) by mouth every 6 hours as needed for itching or allergies 60 tablet 1     docusate sodium (COLACE) 100 MG tablet Take 1 tablet (100 mg) by mouth daily as needed for constipation       EMLA cream APPLY TO ACCESS SITE 30 TO 60 MINUTES PRIOR TO        DIALYSIS 30 g 11     Lactobacillus Rhamnosus, GG, ([facility] [employer]) capsule Take 1 capsule by mouth 2 times daily 90 capsule 1     levETIRAcetam (KEPPRA) 250 MG tablet TAKE 1 AND 1/2 TABLETS DAILY. TAKE AN  ADDITIONAL 250MG AFTER DIALYSIS ON MONDAY, WEDESDAY AND FRIDAY 175 tablet 0     levothyroxine (SYNTHROID/LEVOTHROID) 25 MCG tablet Take 1 tablet (25 mcg) Tuesday, Thursday, Saturday and Sunday and 1.5 tablets (37.5 mcg) on Monday, Wednesday and Friday every week. 102 tablet 2     multivitamin RENAL (NEPHROCAPS/TRIPHROCAPS) 1 MG capsule Take 1 capsule by mouth daily 30 capsule 3     norethindrone (MICRONOR) 0.35 MG tablet Take 1 tablet (0.35 mg) by mouth daily 28 tablet 11     ondansetron (ZOFRAN ODT) 4 MG ODT tab Take 1-2 tablets (4-8 mg) by mouth every 8 hours as needed for nausea 60 tablet 1     sennosides (SENOKOT) 8.6 MG tablet Take 1 tablet by mouth 2 times daily as needed for constipation 180 tablet 3     sevelamer (RENVELA) 800 MG tablet Take 1 tablet (800 mg) by mouth 3 times daily (with meals) and snacks       simethicone (MYLICON) 125 MG chewable tablet Take 1 tablet (125 mg) by mouth 4 times daily as needed for intestinal gas       amLODIPine (NORVASC) 10 MG tablet Take 1 tablet (10 mg) by mouth daily (Patient not taking: Reported on 5/14/2019) 90 tablet 3     doxazosin (CARDURA) 1 MG tablet Take 1 tablet (1 mg) by mouth At Bedtime (Patient not taking: Reported on 5/14/2019) 90 tablet 3     EPINEPHrine (EPIPEN/ADRENACLICK/OR ANY BX GENERIC EQUIV) 0.3 MG/0.3ML injection 2-pack Inject 0.3 mLs (0.3 mg) into the muscle as needed for anaphylaxis (Patient not taking: Reported on 5/14/2019) 0.6 mL 3     HYDROcodone-acetaminophen (NORCO) 5-325 MG tablet Take 1 tablet by mouth at the end of dialysis run as needed for headaches  0     hydrOXYzine (ATARAX) 50 MG tablet          Allergies  Allergies   Allergen Reactions     Chlorhexidine Rash     Rash at site     Sulfa Drugs Rash     Muscle stiffness of neck     Lisinopril Swelling     angioedema     Alcohol Swabs [Isopropyl Alcohol] Rash         Family History  family history includes Cerebrovascular Disease in her father and sister; Diabetes in her sister;  Hypertension in her sister; Kidney Disease in her mother and sister.    Social History  Social History     Tobacco Use     Smoking status: Never Smoker     Smokeless tobacco: Never Used   Substance Use Topics     Alcohol use: No     Alcohol/week: 0.0 oz     Drug use: No       Physical Exam  /88   Pulse 86   Ht 1.524 m (5')   Wt 53.1 kg (117 lb)   BMI 22.85 kg/m     Body mass index is 22.85 kg/m .  GENERAL: Appropriately dressed, appears well.  In no apparent distress  SKIN: +some thinning hair over the scalp; eyebrows extend over lateral corners of each eye. Skin normal color, normal temperature and texture.   EYES: PERRL, EOMI, No conjunctival icterus,  No proptosis, conjunctival redness, stare, or lid retraction  NECK: No visible masses. No palpable adenopathy, or masses.   THYROID: no thyromegaly or palpable nodules   RESP: Lungs clear to auscultation bilaterally. No wheezes, crackles, or rhonchi  CARDIAC: +3/6 systolic murmur most prominent over L sternal border. Loud S2. Regular rate and rhythm, normal S1/S2. No S3/S4.   : pubic hair Diogo stage 4    NEURO: awake, alert, responds appropriately to questions. Cranial nerves intact. Moves all extremities.Gait normal.  No tremor of the outstretched hand.   EXTREMITIES: No clubbing, cyanosis, or edema.    DATA REVIEW  TSH   Date Value Ref Range Status   04/26/2019 2.21 0.40 - 4.00 mU/L Final     T4 Total   Date Value Ref Range Status   07/06/2017 9.6 4.5 - 13.9 ug/dL Final     T4 Free   Date Value Ref Range Status   04/26/2019 1.08 0.76 - 1.46 ng/dL Final       ASSESSMENT/PLAN:   Katlyn Wagner is a 27 yo woman w/ PMHx of IgA nephropathy & ESRD on dialysis, recent endocarditis, galactorrhea secondary to hyperprolactinemia, possible microadenoma, and hypothyroidism who presents for follow-up of her hypothyroidism, hyperprolactinemia, and possible pituitary microadenoma. She has recurrent galactorrhea.     1. Galactorrhea and hyperprolactinemia  Pt's first  episode of galactorrhea improved w/ thyroid hormone replacement. However, she had an additional episode. Prolactin levels may be higher. It does not appear she has been over-stimulating her breast. Her MRI had not changed and in 2017 did not show definite evidence of a microadenoma, though contrast could not be used to fully evaluate this. However contrast use needs to be carefully evaluated considering she has reduced renal function. It was thought that her hyperprolactinemia may more likely be due to her ESRD. However, given the recurrence of galactorrhea, she needs repeat evaluation.   - prolactin level today   - will likely obtain MRI pituitary if higher than previous.  Will discuss contrast use with nephrology   - minimize stimulation of the nipples; do not attempt to frequently express secretions   - may need consideration of cabergoline pending findings   - RTC in 6 months     2. Hypothyroidism  TSH stable on current regimen.   - continue levothyroxine 25mcg 4x weekly and 37.5mcg 3x weekly   - follow-up in 6 months     3. Hair loss   Thi is a new problem.  Possible etiologies include telogen effluvium after endocarditis vs iron deficiency vs androgen excess. Unlikely thyroid-related as pt is biochemically euthyroid. Hypoadrogenism considered given report of pubic hair loss. This would need to be affecting her adrenal glands as well, however there is no evidence of electrolyte abnormalities to suggest primary adrenal insufficiency.  - check free/total testosterone, SHBG, androstenedione, and DHEA-S  - if continued scalp hair loss and the aforementioned normal, pt may benefit from seeing dermatology     4. Oligomenorrhea   Pt on OCP that purposely extends her menstrual cycles to 56 days.       Pt seen and discussed w/ Dr. Coles.    Wilton Chen MD  Internal Medicine, PGY-2  [phone]    ATTENDING NOTE    I have seen and examined the patient, reviewed and edited the resident's note, and agree with the  plan of care.    Nidhi Coles MD PhD    Division of Endocrinology and Diabetes

## 2019-05-14 NOTE — PATIENT INSTRUCTIONS
Get blood work to check prolactin, androgen labs.   We will let you know what your levels are and if you need a repeat MRI of the pituitary gland.

## 2019-05-18 DIAGNOSIS — E03.9 ACQUIRED HYPOTHYROIDISM: ICD-10-CM

## 2019-05-20 DIAGNOSIS — K59.04 CHRONIC IDIOPATHIC CONSTIPATION: Primary | ICD-10-CM

## 2019-05-20 RX ORDER — POLYETHYLENE GLYCOL 3350 17 G/17G
1 POWDER, FOR SOLUTION ORAL DAILY
Qty: 500 G | Refills: 3 | Status: ON HOLD | OUTPATIENT
Start: 2019-05-20 | End: 2019-08-12

## 2019-05-20 RX ORDER — LEVOTHYROXINE SODIUM 25 UG/1
TABLET ORAL
Qty: 105 TABLET | Refills: 1 | Status: SHIPPED | OUTPATIENT
Start: 2019-05-20 | End: 2019-11-19

## 2019-06-10 ENCOUNTER — TELEPHONE (OUTPATIENT)
Dept: TRANSPLANT | Facility: CLINIC | Age: 29
End: 2019-06-10

## 2019-06-10 DIAGNOSIS — T82.9XXA COMPLICATION OF VASCULAR ACCESS FOR DIALYSIS, INITIAL ENCOUNTER: ICD-10-CM

## 2019-06-10 DIAGNOSIS — Z76.82 ORGAN TRANSPLANT CANDIDATE: ICD-10-CM

## 2019-06-10 DIAGNOSIS — N18.6 ESRD ON DIALYSIS (H): Primary | ICD-10-CM

## 2019-06-10 DIAGNOSIS — T82.898A OTHER SPECIFIED COMPLICATION OF VASCULAR PROSTHETIC DEVICES, IMPLANTS AND GRAFTS, INITIAL ENCOUNTER (H): ICD-10-CM

## 2019-06-10 DIAGNOSIS — N18.6 ESRD (END STAGE RENAL DISEASE) (H): ICD-10-CM

## 2019-06-10 DIAGNOSIS — Z99.2 ESRD ON DIALYSIS (H): Primary | ICD-10-CM

## 2019-06-10 NOTE — TELEPHONE ENCOUNTER
Received a call from [facility] dialysis charge RN, who requested fistulogram per Dr. Parra. Dialysis RN reported patient's LUE AV fistula has diminished thrill with positive bruit.  mL/mins, arterial pressure 160-200 and venous pressure 162-218 at last dialysis run. Patient C/O pain in AV fistula area and feel funny. Writer recommended LUE US of AV fistula prior to fistulogram D/T last fistulogram without intervention in 2 years ago. Writer will discuss with patient for plan. Dialysis RN verbalized acceptance and understanding of plan.   Writer called and discussed with the patient above plan for her LUE AV fistula issues, she agrees with the plan. A  will call her for appt. Patient verbalized acceptance and understanding of plan.

## 2019-06-13 ENCOUNTER — MYC REFILL (OUTPATIENT)
Dept: NEPHROLOGY | Facility: CLINIC | Age: 29
End: 2019-06-13

## 2019-06-13 DIAGNOSIS — Z99.2 ESRD NEEDING DIALYSIS (H): ICD-10-CM

## 2019-06-13 DIAGNOSIS — N18.6 ESRD NEEDING DIALYSIS (H): ICD-10-CM

## 2019-06-14 ENCOUNTER — ANCILLARY PROCEDURE (OUTPATIENT)
Dept: ULTRASOUND IMAGING | Facility: CLINIC | Age: 29
End: 2019-06-14
Attending: CLINICAL NURSE SPECIALIST
Payer: MEDICARE

## 2019-06-14 DIAGNOSIS — Z99.2 ESRD ON DIALYSIS (H): ICD-10-CM

## 2019-06-14 DIAGNOSIS — D35.2 PITUITARY MICROADENOMA WITH HYPERPROLACTINEMIA (H): ICD-10-CM

## 2019-06-14 DIAGNOSIS — L65.9 HAIR LOSS: ICD-10-CM

## 2019-06-14 DIAGNOSIS — T82.898A OTHER SPECIFIED COMPLICATION OF VASCULAR PROSTHETIC DEVICES, IMPLANTS AND GRAFTS, INITIAL ENCOUNTER (H): ICD-10-CM

## 2019-06-14 DIAGNOSIS — E22.9 PITUITARY MICROADENOMA WITH HYPERPROLACTINEMIA (H): ICD-10-CM

## 2019-06-14 DIAGNOSIS — N18.6 ESRD ON DIALYSIS (H): ICD-10-CM

## 2019-06-14 DIAGNOSIS — T82.9XXA COMPLICATION OF VASCULAR ACCESS FOR DIALYSIS, INITIAL ENCOUNTER: ICD-10-CM

## 2019-06-14 LAB — PROLACTIN SERPL-MCNC: 51 UG/L (ref 3–27)

## 2019-06-14 PROCEDURE — 84403 ASSAY OF TOTAL TESTOSTERONE: CPT | Performed by: INTERNAL MEDICINE

## 2019-06-14 PROCEDURE — 84270 ASSAY OF SEX HORMONE GLOBUL: CPT | Performed by: INTERNAL MEDICINE

## 2019-06-14 PROCEDURE — 84146 ASSAY OF PROLACTIN: CPT | Performed by: INTERNAL MEDICINE

## 2019-06-14 PROCEDURE — 82627 DEHYDROEPIANDROSTERONE: CPT | Performed by: INTERNAL MEDICINE

## 2019-06-15 LAB
SHBG SERPL-SCNC: 27 NMOL/L (ref 30–135)
TESTOST FREE SERPL-MCNC: 0.52 NG/DL (ref 0.08–0.74)
TESTOST SERPL-MCNC: 26 NG/DL (ref 8–60)

## 2019-06-16 LAB — ANDROST SERPL-MCNC: 1.24 NG/ML (ref 0.26–2.14)

## 2019-06-17 LAB — DHEA-S SERPL-MCNC: 229 UG/DL (ref 35–430)

## 2019-07-09 ENCOUNTER — ALLIED HEALTH/NURSE VISIT (OUTPATIENT)
Dept: TRANSPLANT | Facility: CLINIC | Age: 29
End: 2019-07-09
Attending: PHYSICIAN ASSISTANT
Payer: MEDICARE

## 2019-07-09 ENCOUNTER — ANCILLARY PROCEDURE (OUTPATIENT)
Dept: CARDIOLOGY | Facility: CLINIC | Age: 29
End: 2019-07-09
Attending: INTERNAL MEDICINE
Payer: MEDICARE

## 2019-07-09 ENCOUNTER — OFFICE VISIT (OUTPATIENT)
Dept: NEPHROLOGY | Facility: CLINIC | Age: 29
End: 2019-07-09
Attending: PHYSICIAN ASSISTANT
Payer: MEDICARE

## 2019-07-09 VITALS
HEIGHT: 60 IN | WEIGHT: 119.5 LBS | SYSTOLIC BLOOD PRESSURE: 135 MMHG | BODY MASS INDEX: 23.46 KG/M2 | OXYGEN SATURATION: 100 % | DIASTOLIC BLOOD PRESSURE: 90 MMHG | HEART RATE: 67 BPM

## 2019-07-09 DIAGNOSIS — Z76.82 ORGAN TRANSPLANT CANDIDATE: ICD-10-CM

## 2019-07-09 DIAGNOSIS — N18.6 END STAGE RENAL DISEASE (H): Primary | ICD-10-CM

## 2019-07-09 DIAGNOSIS — N18.6 ESRD (END STAGE RENAL DISEASE) (H): ICD-10-CM

## 2019-07-09 DIAGNOSIS — Z76.82 AWAITING ORGAN TRANSPLANT: Primary | ICD-10-CM

## 2019-07-09 DIAGNOSIS — I10 HYPERTENSION: ICD-10-CM

## 2019-07-09 ASSESSMENT — MIFFLIN-ST. JEOR: SCORE: 1203.55

## 2019-07-09 NOTE — PROGRESS NOTES
Assessment and Plan:  #Kidney Transplant Wait List Evaluation: Patient is an excellent candidate overall. Patient should remain active on the wait list.    # ESKD from unknown etiology (no kidney biopsy): doing well on dialysis since 2013, and would likely benefit from a kidney transplant.    # Cardiac Risk: she has no known history of cardiac disease or events and is asymptomatic with exertion. Repeat ECHO today following pulmonary valve infective endocarditis (Strep viridans) showed normal EF and resolution of vegetation.     # Pulmonary valve infective endocarditis (Strep viridans): admit in 2019 and completed IV vancomycin and repeat blood cultures (negative) 2019. ECHO as above.     # Chronic ITP: platelets have remained > 150k.     # Focal epilepsy: had been on levetiracetam for 5 years without recurrence of seizures, and so medication recently discontinued by neurology. No recurrence since. Next neurology visit later this week.    # Hyperprolactinemia: will follow up with endocrinology to see if MRI is warranted given recent prolactin labs.    # Health Maintenance: PAP: Up to date and Dental: Up to date     Discussed the risks and benefits of a transplant, including the risk of surgery and immunosuppression medications.    KDPI: We discussed approximate remaining wait time and how that is influenced by issues such as blood type and sensitization (PRA) and access to a living donor. I contrasted potential waiting time for living vs  donor kidneys from  normal (0-85%) or higher (%) kidney donor profile index (KDPI) donors and their associated outcomes. I would not recommend Ms. Wagner to consider kidneys from high KDPI donors. The reason for this decision is best summarized as: improved long term graft survival.    Patient s overall re-evaluation may require further discussion in the Transplant Program s multidisciplinary selection committee for a final recommendation on the  patient s suitability for transplant.     Reason for Visit:  Mona Wagner is a 28-year-old female with ESKD from unknown etiology (no kidney biopsy), who presents for kidney transplant wait list evaluation.     Date of Initial Transplant Evaluation:  December 2013        Current Transplant Phase: Waitlist: Active  Official UNOS Listing Date: 12/13/2013  Blood Type: O        cPRA: 25       Date of cPRA: June 2019  Transplant Coordinator: Soumya Sylvester Transplant Office phone number [phone]     Previous Medical Issues:  None     History of Present Illness:   Ms. Wagner is a 28-year-old female with ESKD from unknown etiology on dialysis since December 2013, SAH in 2014 while on coumadin and subsequent seizure x 2, hypothyroid, chronic ITP, and hyperprolactinemia secondary to pituitary microadenoma.         Interim Events:        - Admit January 2019 for pulmonary valve infective endocarditis (Strep viridans) treated with a several week course of IV vancomycin (completed end of February 2019) managed by [facility]. Repeat ECHO in February 2019 was without vegetations and blood cultures done shortly after were without growth.          - Repeat episode of galactorrhea x 1, which is being further evaluated by endocrinology. Patient reports not having heard back from endocrinology regarding whether or not she needs MRI.          Kidney Disease: dialysis continues to go well.        Kidney Disease Dx: Unknown etiology (no kidney biopsy)        On Dialysis: Yes, Dialysis Type: [facility]         Cardiac/Vascular Disease History:       Known CAD: No       Known PAD/Claudication Symptoms: No       Known Heart Failure: No       Arrhythmia:  No       Pulmonary Hypertension: No       Valvular Disease: No       Other: None       New Cardiac/Vascular Events: Yes; pulmonic valve endocarditis as above         Functional Capacity/Frailty:        She is not limited physically by anything. 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  She denies chest pain, SOB, or claudication symptoms.      Fatigue/Decreased Energy: [x] No [] Yes    Chest Pain or SOB with Exertion: [x] No [] Yes    Significant Weight Change: [x] No [] Yes    Nausea, Vomiting or Diarrhea: [x] No [] Yes    Fever, Sweats or Chills:  [x] No [] Yes    Leg Swelling [x] No [] Yes        Other Pertinent Transplant Surgical Issues:  Recent Blood Transfusion: No  Previous Abdominal Transplant: No  Bladder Dysfunction: she is anuric.  Chronic/Recurrent Infections: No  Chronic Anticoagulation: No  Jehovah s Witness: No    Review Of Systems:  A comprehensive review of systems was obtained and negative, except as noted in the HPI or PMH.     Active Problem List:   Patient Active Problem List   Diagnosis     ESRD (end stage renal disease) on dialysis (H)     DVT of upper extremity (deep vein thrombosis) (H)     Seizure disorder (H)     HTN (hypertension)     Other general symptoms(780.99)     Galactorrhea     Acquired hypothyroidism     Anemia in chronic kidney disease     Increased prolactin level (H)     Hyperphosphatemia     Hypomagnesemia     Normocytic anemia     Thrombocytopenia (H)     Infective endocarditis       Personal History:  Social History     Socioeconomic History     Marital status:      Spouse name: Not on file     Number of children: Not on file     Years of education: Not on file     Highest education level: Not on file   Occupational History     Not on file   Social Needs     Financial resource strain: Not on file     Food insecurity:     Worry: Not on file     Inability: Not on file     Transportation needs:     Medical: Not on file     Non-medical: Not on file   Tobacco Use     Smoking status: Never Smoker     Smokeless tobacco: Never Used   Substance and Sexual Activity     Alcohol use: No     Alcohol/week: 0.0 oz     Drug use: No     Sexual activity: Yes     Partners: Male   Lifestyle     Physical activity:     Days per week: Not on file     Minutes per  session: Not on file     Stress: Not on file   Relationships     Social connections:     Talks on phone: Not on file     Gets together: Not on file     Attends Worship service: Not on file     Active member of club or organization: Not on file     Attends meetings of clubs or organizations: Not on file     Relationship status: Not on file     Intimate partner violence:     Fear of current or ex partner: Not on file     Emotionally abused: Not on file     Physically abused: Not on file     Forced sexual activity: Not on file   Other Topics Concern     Parent/sibling w/ CABG, MI or angioplasty before 65F 55M? Not Asked   Social History Narrative    Date of Service:5/15/2013     Name: Mona VALDOVINOS (Month, Day, Year of birth): 1990     MRN: [id number]     New Patient: Yes    Preferred Language: English     Needed: No    County of Residence: [place]    Marital Status:     Household size: 8    Number of Dependents:0     Pregnant: 0    Average Monthly Income: $ 600    Citizenship Status: Citizen    Gave Pt [employer] and [employer] applications        Allergies:  Allergies   Allergen Reactions     Chlorhexidine Rash     Rash at site     Sulfa Drugs Rash     Muscle stiffness of neck     Lisinopril Swelling     angioedema     Alcohol Swabs [Isopropyl Alcohol] Rash        Medications:  Current Outpatient Medications   Medication Sig     acetaminophen (TYLENOL) 325 MG tablet Take 2 tablets (650 mg) by mouth every 4 hours as needed for mild pain     amLODIPine (NORVASC) 10 MG tablet Take 1 tablet (10 mg) by mouth daily (Patient not taking: Reported on 2019)     B Complex-C-Zn-Folic Acid (DIALYVITE 800-ZINC 15) 0.8 MG TABS Take 1 tablet by mouth daily     cinacalcet (SENSIPAR) 30 MG tablet Take 1 tablet (30 mg) by mouth daily (with dinner)     diphenhydrAMINE (BENADRYL) 25 MG tablet Take 1-2 tablets (25-50 mg) by mouth every 6 hours as needed for itching or allergies     docusate sodium (COLACE) 100  MG tablet Take 1 tablet (100 mg) by mouth daily as needed for constipation     doxazosin (CARDURA) 1 MG tablet Take 1 tablet (1 mg) by mouth At Bedtime (Patient not taking: Reported on 5/14/2019)     EMLA cream APPLY TO ACCESS SITE 30 TO 60 MINUTES PRIOR TO        DIALYSIS     EPINEPHrine (EPIPEN/ADRENACLICK/OR ANY BX GENERIC EQUIV) 0.3 MG/0.3ML injection 2-pack Inject 0.3 mLs (0.3 mg) into the muscle as needed for anaphylaxis (Patient not taking: Reported on 5/14/2019)     HYDROcodone-acetaminophen (NORCO) 5-325 MG tablet Take 1 tablet by mouth at the end of dialysis run as needed for headaches     hydrOXYzine (ATARAX) 50 MG tablet      Lactobacillus Rhamnosus, GG, ([facility] [employer]) capsule Take 1 capsule by mouth 2 times daily     levETIRAcetam (KEPPRA) 250 MG tablet TAKE 1 AND 1/2 TABLETS DAILY. TAKE AN ADDITIONAL 250MG AFTER DIALYSIS ON MONDAY, WEDESDAY AND FRIDAY     levothyroxine (SYNTHROID/LEVOTHROID) 25 MCG tablet Take 1 tablet (25 mcg) Tuesday, Thursday, Saturday and Sunday and 1.5 tablets (37.5 mcg) on Monday, Wednesday and Friday every week.     multivitamin RENAL (NEPHROCAPS/TRIPHROCAPS) 1 MG capsule Take 1 capsule by mouth daily     norethindrone (MICRONOR) 0.35 MG tablet Take 1 tablet (0.35 mg) by mouth daily     ondansetron (ZOFRAN ODT) 4 MG ODT tab Take 1-2 tablets (4-8 mg) by mouth every 8 hours as needed for nausea     polyethylene glycol (MIRALAX/GLYCOLAX) powder Take 17 g (1 capful) by mouth daily     RENVELA 800 MG tablet Take 3 tablets (2,400 mg) by mouth 3 times daily (with meals) 1 with snacks     sennosides (SENOKOT) 8.6 MG tablet Take 1 tablet by mouth 2 times daily as needed for constipation     sevelamer (RENVELA) 800 MG tablet Take 1 tablet (800 mg) by mouth 3 times daily (with meals) and snacks     simethicone (MYLICON) 125 MG chewable tablet Take 1 tablet (125 mg) by mouth 4 times daily as needed for intestinal gas     No current facility-administered medications for this  visit.         Vitals:  /90   Pulse 67   Ht 1.524 m (5')   Wt 54.2 kg (119 lb 8 oz)   SpO2 100%   BMI 23.34 kg/m       Exam:  GENERAL APPEARANCE: alert and no distress  HENT: mouth without ulcers or lesions. Good dentition   LYMPHATICS: no cervical or supraclavicular nodes  RESP: lungs clear to auscultation - no rales, rhonchi or wheezes  CV: regular rhythm, normal rate, no rub, no murmur  EDEMA: no LE edema bilaterally  ABDOMEN: soft, nondistended, nontender  MS: extremities normal - no gross deformities noted, no evidence of inflammation in joints, no muscle tenderness  SKIN: no rash  Femoral pulses 2+

## 2019-07-09 NOTE — NURSING NOTE
Chief Complaint   Patient presents with     RECHECK     Waitlist f/u     Blood pressure 135/90, pulse 67, height 1.524 m (5'), weight 54.2 kg (119 lb 8 oz), SpO2 100 %, not currently breastfeeding.    Pennie Augustine, CMA

## 2019-07-09 NOTE — LETTER
2019      RE: Mona Wagner  [address]  [place] [address]       Assessment and Plan:  #Kidney Transplant Wait List Evaluation: Patient is an excellent candidate overall. Patient should remain active on the wait list.    # ESKD from unknown etiology (no kidney biopsy): doing well on dialysis since 2013, and would likely benefit from a kidney transplant.    # Cardiac Risk: she has no known history of cardiac disease or events and is asymptomatic with exertion. Repeat ECHO today following pulmonary valve infective endocarditis (Strep viridans) showed normal EF and resolution of vegetation.     # Pulmonary valve infective endocarditis (Strep viridans): admit in 2019 and completed IV vancomycin and repeat blood cultures (negative) 2019. ECHO as above.     # Chronic ITP: platelets have remained > 150k.     # Focal epilepsy: had been on levetiracetam for 5 years without recurrence of seizures, and so medication recently discontinued by neurology. No recurrence since. Next neurology visit later this week.    # Hyperprolactinemia: will follow up with endocrinology to see if MRI is warranted given recent prolactin labs.    # Health Maintenance: PAP: Up to date and Dental: Up to date     Discussed the risks and benefits of a transplant, including the risk of surgery and immunosuppression medications.    KDPI: We discussed approximate remaining wait time and how that is influenced by issues such as blood type and sensitization (PRA) and access to a living donor. I contrasted potential waiting time for living vs  donor kidneys from  normal (0-85%) or higher (%) kidney donor profile index (KDPI) donors and their associated outcomes. I would not recommend Ms. Wagner to consider kidneys from high KDPI donors. The reason for this decision is best summarized as: improved long term graft survival.    Patient s overall re-evaluation may require further discussion in the Transplant  Program s multidisciplinary selection committee for a final recommendation on the patient s suitability for transplant.     Reason for Visit:  Mona Wagner is a 28-year-old female with ESKD from unknown etiology (no kidney biopsy), who presents for kidney transplant wait list evaluation.     Date of Initial Transplant Evaluation:  December 2013        Current Transplant Phase: Waitlist: Active  Official UNOS Listing Date: 12/13/2013  Blood Type: O        cPRA: 25       Date of cPRA: June 2019  Transplant Coordinator: Soumya Sylvester Transplant Office phone number [phone]     Previous Medical Issues:  None     History of Present Illness:   Ms. Wagner is a 28-year-old female with ESKD from unknown etiology on dialysis since December 2013, SAH in 2014 while on coumadin and subsequent seizure x 2, hypothyroid,  chronic ITP, and hyperprolactinemia secondary to pituitary microadenoma.         Interim Events:        - Admit January 2019 for pulmonary valve infective endocarditis (Strep viridans) treated with a several week course of IV vancomycin (completed end of February 2019) managed by [facility]. Repeat ECHO in February 2019 was without vegetations and blood cultures done shortly after were without growth.          - Repeat episode of galactorrhea x 1, which is being further evaluated by endocrinology. Patient reports not having heard back from endocrinology regarding whether or not she needs MRI.          Kidney Disease: dialysis continues to go well.        Kidney Disease Dx: Unknown etiology (no kidney biopsy)        On Dialysis: Yes, Dialysis Type: [facility]         Cardiac/Vascular Disease History:       Known CAD: No       Known PAD/Claudication Symptoms: No       Known Heart Failure: No       Arrhythmia:  No       Pulmonary Hypertension: No       Valvular Disease: No       Other: None       New Cardiac/Vascular Events: Yes; pulmonic valve endocarditis as above         Functional Capacity/Frailty:         She is not limited physically by anything. 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 She denies chest pain, SOB, or claudication symptoms.      Fatigue/Decreased Energy: [x] No [] Yes    Chest Pain or SOB with Exertion: [x] No [] Yes    Significant Weight Change: [x] No [] Yes    Nausea, Vomiting or Diarrhea: [x] No [] Yes    Fever, Sweats or Chills:  [x] No [] Yes    Leg Swelling [x] No [] Yes        Other Pertinent Transplant Surgical Issues:  Recent Blood Transfusion: No  Previous Abdominal Transplant: No  Bladder Dysfunction: she is anuric.  Chronic/Recurrent Infections: No  Chronic Anticoagulation: No  Jehovah s Witness: No    Review Of Systems:  A comprehensive review of systems was obtained and negative, except as noted in the HPI or PMH.     Active Problem List:   Patient Active Problem List   Diagnosis     ESRD (end stage renal disease) on dialysis (H)     DVT of upper extremity (deep vein thrombosis) (H)     Seizure disorder (H)     HTN (hypertension)     Other general symptoms(780.99)     Galactorrhea     Acquired hypothyroidism     Anemia in chronic kidney disease     Increased prolactin level (H)     Hyperphosphatemia     Hypomagnesemia     Normocytic anemia     Thrombocytopenia (H)     Infective endocarditis       Personal History:  Social History     Socioeconomic History     Marital status:      Spouse name: Not on file     Number of children: Not on file     Years of education: Not on file     Highest education level: Not on file   Occupational History     Not on file   Social Needs     Financial resource strain: Not on file     Food insecurity:     Worry: Not on file     Inability: Not on file     Transportation needs:     Medical: Not on file     Non-medical: Not on file   Tobacco Use     Smoking status: Never Smoker     Smokeless tobacco: Never Used   Substance and Sexual Activity     Alcohol use: No     Alcohol/week: 0.0 oz     Drug use: No     Sexual activity: Yes     Partners: Male    Lifestyle     Physical activity:     Days per week: Not on file     Minutes per session: Not on file     Stress: Not on file   Relationships     Social connections:     Talks on phone: Not on file     Gets together: Not on file     Attends [employer] service: Not on file     Active member of club or organization: Not on file     Attends meetings of clubs or organizations: Not on file     Relationship status: Not on file     Intimate partner violence:     Fear of current or ex partner: Not on file     Emotionally abused: Not on file     Physically abused: Not on file     Forced sexual activity: Not on file   Other Topics Concern     Parent/sibling w/ CABG, MI or angioplasty before 65F 55M? Not Asked   Social History Narrative    Date of Service:5/15/2013     Name: Mona VALDOVINOS (Month, Day, Year of birth): 1990     MRN: [id number]     New Patient: Yes    Preferred Language: English     Needed: No    County of Residence: [place]    Marital Status:     Household size: 8    Number of Dependents:0     Pregnant: 0    Average Monthly Income: $ 600    Citizenship Status: Citizen    Gave Pt [employer] and [employer] applications        Allergies:  Allergies   Allergen Reactions     Chlorhexidine Rash     Rash at site     Sulfa Drugs Rash     Muscle stiffness of neck     Lisinopril Swelling     angioedema     Alcohol Swabs [Isopropyl Alcohol] Rash        Medications:  Current Outpatient Medications   Medication Sig     acetaminophen (TYLENOL) 325 MG tablet Take 2 tablets (650 mg) by mouth every 4 hours as needed for mild pain     amLODIPine (NORVASC) 10 MG tablet Take 1 tablet (10 mg) by mouth daily (Patient not taking: Reported on 2019)     B Complex-C-Zn-Folic Acid (DIALYVITE 800-ZINC 15) 0.8 MG TABS Take 1 tablet by mouth daily     cinacalcet (SENSIPAR) 30 MG tablet Take 1 tablet (30 mg) by mouth daily (with dinner)     diphenhydrAMINE (BENADRYL) 25 MG tablet Take 1-2 tablets (25-50 mg) by mouth  every 6 hours as needed for itching or allergies     docusate sodium (COLACE) 100 MG tablet Take 1 tablet (100 mg) by mouth daily as needed for constipation     doxazosin (CARDURA) 1 MG tablet Take 1 tablet (1 mg) by mouth At Bedtime (Patient not taking: Reported on 5/14/2019)     EMLA cream APPLY TO ACCESS SITE 30 TO 60 MINUTES PRIOR TO        DIALYSIS     EPINEPHrine (EPIPEN/ADRENACLICK/OR ANY BX GENERIC EQUIV) 0.3 MG/0.3ML injection 2-pack Inject 0.3 mLs (0.3 mg) into the muscle as needed for anaphylaxis (Patient not taking: Reported on 5/14/2019)     HYDROcodone-acetaminophen (NORCO) 5-325 MG tablet Take 1 tablet by mouth at the end of dialysis run as needed for headaches     hydrOXYzine (ATARAX) 50 MG tablet      Lactobacillus Rhamnosus, GG, ([facility] & [employer]) capsule Take 1 capsule by mouth 2 times daily     levETIRAcetam (KEPPRA) 250 MG tablet TAKE 1 AND 1/2 TABLETS DAILY. TAKE AN ADDITIONAL 250MG AFTER DIALYSIS ON MONDAY, WEDESDAY AND FRIDAY     levothyroxine (SYNTHROID/LEVOTHROID) 25 MCG tablet Take 1 tablet (25 mcg) Tuesday, Thursday, Saturday and Sunday and 1.5 tablets (37.5 mcg) on Monday, Wednesday and Friday every week.     multivitamin RENAL (NEPHROCAPS/TRIPHROCAPS) 1 MG capsule Take 1 capsule by mouth daily     norethindrone (MICRONOR) 0.35 MG tablet Take 1 tablet (0.35 mg) by mouth daily     ondansetron (ZOFRAN ODT) 4 MG ODT tab Take 1-2 tablets (4-8 mg) by mouth every 8 hours as needed for nausea     polyethylene glycol (MIRALAX/GLYCOLAX) powder Take 17 g (1 capful) by mouth daily     RENVELA 800 MG tablet Take 3 tablets (2,400 mg) by mouth 3 times daily (with meals) 1 with snacks     sennosides (SENOKOT) 8.6 MG tablet Take 1 tablet by mouth 2 times daily as needed for constipation     sevelamer (RENVELA) 800 MG tablet Take 1 tablet (800 mg) by mouth 3 times daily (with meals) and snacks     simethicone (MYLICON) 125 MG chewable tablet Take 1 tablet (125 mg) by mouth 4 times daily as  needed for intestinal gas     No current facility-administered medications for this visit.         Vitals:  /90   Pulse 67   Ht 1.524 m (5')   Wt 54.2 kg (119 lb 8 oz)   SpO2 100%   BMI 23.34 kg/m        Exam:  GENERAL APPEARANCE: alert and no distress  HENT: mouth without ulcers or lesions. Good dentition   LYMPHATICS: no cervical or supraclavicular nodes  RESP: lungs clear to auscultation - no rales, rhonchi or wheezes  CV: regular rhythm, normal rate, no rub, no murmur  EDEMA: no LE edema bilaterally  ABDOMEN: soft, nondistended, nontender  MS: extremities normal - no gross deformities noted, no evidence of inflammation in joints, no muscle tenderness  SKIN: no rash  Femoral pulses 2+       Lorin Hutchins PA-C

## 2019-07-10 ENCOUNTER — DOCUMENTATION ONLY (OUTPATIENT)
Dept: TRANSPLANT | Facility: CLINIC | Age: 29
End: 2019-07-10

## 2019-07-10 DIAGNOSIS — Z76.82 AWAITING ORGAN TRANSPLANT: Primary | ICD-10-CM

## 2019-07-10 LAB — INTERPRETATION ECG - MUSE: NORMAL

## 2019-07-11 ENCOUNTER — OFFICE VISIT (OUTPATIENT)
Dept: NEUROLOGY | Facility: CLINIC | Age: 29
End: 2019-07-11
Payer: MEDICARE

## 2019-07-11 VITALS
WEIGHT: 118.4 LBS | DIASTOLIC BLOOD PRESSURE: 79 MMHG | BODY MASS INDEX: 23.12 KG/M2 | SYSTOLIC BLOOD PRESSURE: 118 MMHG | HEART RATE: 81 BPM

## 2019-07-11 DIAGNOSIS — R56.9 SEIZURES (H): Primary | ICD-10-CM

## 2019-07-11 ASSESSMENT — PAIN SCALES - GENERAL: PAINLEVEL: NO PAIN (1)

## 2019-07-11 NOTE — PROGRESS NOTES
[facility] Epilepsy Care Progress Note      Patient:  Mona Wagner  :  1990   Age:  28 year old   Today's Office Visit:  2019    Epilepsy Data:    The patient had 2 seizures in 2014, one at home and one in the hospital.  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  She was on warfarin since 2014 for DVT from a prior dialysis catheter site.  She was put on Dilantin initially.  She was switched to Keppra when she was first seen here, due to teratogenicity of Dilantin.  Compliant with medication.   She has a pituitary microadenoma, which is followed up by an endocrinologist.       History of Present Illness:    Mona is here for a follow up on her seizures. The patient had a SAH and 2 seizures on 9/15/2014. The patient has been on LVT and did not have any further seizures. Head CT 18 showed no intracranial hemorrhage. EEG on 10/29/14 showed right frontotemporal slowing. EEG on 16 was normal. In the last visit, we decided to taper levetiracetam to off due to normal EEG and MRI and no seizures for almost 4 years. She has been off Keppra for the past month. She dis not have any seizures or symptoms concerning for recurrence of seizures. Overall she is doing well.   She is getting dialysis. She is in the list for kidney transplant and is getting closer to the end of the process.     Current Outpatient Medications   Medication Sig Dispense Refill     acetaminophen (TYLENOL) 325 MG tablet Take 2 tablets (650 mg) by mouth every 4 hours as needed for mild pain 100 tablet 1     B Complex-C-Zn-Folic Acid (DIALYVITE 800-ZINC 15) 0.8 MG TABS Take 1 tablet by mouth daily 90 tablet 3     cinacalcet (SENSIPAR) 30 MG tablet Take 1 tablet (30 mg) by mouth daily (with dinner)       diphenhydrAMINE (BENADRYL) 25 MG tablet Take 1-2 tablets (25-50 mg) by mouth every 6 hours as needed for itching or allergies 60 tablet 1     docusate sodium (COLACE) 100 MG tablet Take 1 tablet (100 mg) by mouth daily as  needed for constipation       EMLA cream APPLY TO ACCESS SITE 30 TO 60 MINUTES PRIOR TO        DIALYSIS 30 g 11     HYDROcodone-acetaminophen (NORCO) 5-325 MG tablet Take 1 tablet by mouth at the end of dialysis run as needed for headaches  0     Lactobacillus Rhamnosus, GG, ([facility] & [employer]) capsule Take 1 capsule by mouth 2 times daily 90 capsule 1     levETIRAcetam (KEPPRA) 250 MG tablet TAKE 1 AND 1/2 TABLETS DAILY. TAKE AN ADDITIONAL 250MG AFTER DIALYSIS ON MONDAY, WEDESDAY AND FRIDAY 175 tablet 0     levothyroxine (SYNTHROID/LEVOTHROID) 25 MCG tablet Take 1 tablet (25 mcg) Tuesday, Thursday, Saturday and Sunday and 1.5 tablets (37.5 mcg) on Monday, Wednesday and Friday every week. 105 tablet 1     multivitamin RENAL (NEPHROCAPS/TRIPHROCAPS) 1 MG capsule Take 1 capsule by mouth daily 30 capsule 3     norethindrone (MICRONOR) 0.35 MG tablet Take 1 tablet (0.35 mg) by mouth daily 28 tablet 11     ondansetron (ZOFRAN ODT) 4 MG ODT tab Take 1-2 tablets (4-8 mg) by mouth every 8 hours as needed for nausea 60 tablet 1     polyethylene glycol (MIRALAX/GLYCOLAX) powder Take 17 g (1 capful) by mouth daily 500 g 3     RENVELA 800 MG tablet Take 3 tablets (2,400 mg) by mouth 3 times daily (with meals) 1 with snacks 300 tablet 11     sennosides (SENOKOT) 8.6 MG tablet Take 1 tablet by mouth 2 times daily as needed for constipation 180 tablet 3     sevelamer (RENVELA) 800 MG tablet Take 1 tablet (800 mg) by mouth 3 times daily (with meals) and snacks       simethicone (MYLICON) 125 MG chewable tablet Take 1 tablet (125 mg) by mouth 4 times daily as needed for intestinal gas       amLODIPine (NORVASC) 10 MG tablet Take 1 tablet (10 mg) by mouth daily (Patient not taking: Reported on 5/14/2019) 90 tablet 3     doxazosin (CARDURA) 1 MG tablet Take 1 tablet (1 mg) by mouth At Bedtime (Patient not taking: Reported on 5/14/2019) 90 tablet 3     EPINEPHrine (EPIPEN/ADRENACLICK/OR ANY BX GENERIC EQUIV) 0.3 MG/0.3ML  injection 2-pack Inject 0.3 mLs (0.3 mg) into the muscle as needed for anaphylaxis (Patient not taking: Reported on 5/14/2019) 0.6 mL 3     hydrOXYzine (ATARAX) 50 MG tablet         Results for LIZZETTE PALMER (MRN [id number]) as of 7/11/2019 11:00   Ref. Range 9/28/2018 10:35   Keppra (Levetiracetam) Level Latest Ref Range: 12 - 46 ug/mL 21     Review of Systems:  Lethargy / Tiredness:  Yes  Nausea / Vomiting:  No  Double Vision:  No  Sleepiness:  Yes  Depression:  Yes  Slowed Cognitive Function:  No  Memory Problems:  No  Poor Balance:  No  Dizziness:  No  Appetite Changes:  No  Sleep Changes:  No  Behavioral Changes:  No  Skin: negative  Respiratory: No shortness of breath and No cough  Cardiovascular: negative  Have you experienced a traumatic fall since your last visit: NO    Other Issues:    Is patient safe to drive:  Recommended to be cautious in the next 3-4 months since she just came off the medication. She only drives two days a week and that is a very short distance.     Woman Care:   Patient is:    Sexually Active:  Yes  Type of Birth Control:  pill: mini pill  Pregnant:  no.  Planning to become pregnant:  No  Currently Breastfeeding:  No    Exam:    /79   Pulse 81   Wt 118 lb 6.4 oz (53.7 kg)   Breastfeeding? No   BMI 23.12 kg/m       Wt Readings from Last 5 Encounters:   07/11/19 118 lb 6.4 oz (53.7 kg)   07/09/19 119 lb 8 oz (54.2 kg)   05/14/19 117 lb (53.1 kg)   03/07/19 112 lb 8 oz (51 kg)   03/05/19 115 lb 6.4 oz (52.3 kg)     General Appearance: Alert, awake, cooperative, NAD  Gait:  steady  Attention Span:  Normal  Language:  No aphasia or dysarthria  Extraocular Movements:  Intact, no nystagmus  Coordination:  Normal  Facial Strength:  Normal  Limb Strength:  No focal weakness     Assessment and Plan:    The patient has a history of seizures in 9/2014 with 2 probable secondary generalized seizures in the setting of right frontal SAH. The patient had been on Keppra and had no more  seizures. Her last EEG and head CT have been normal. Due to low risk of recurrence we decided to taper Keppra to off. She has been off for the past month. She did not have a recurrence. I advised her to be cautious with driving during the next few months as she just came off the medication and although the risk of recurrence is low, it's not zero. I don't make a follow up appointment for her at this point. I'll be more than happy to see her in case she had any questions or concerns or had seizure-recurrence.       As described above, I met with the patient for 15 minutes and during this time counseling was greater than 50% of the visit time.  Theresa Sapp MD

## 2019-07-11 NOTE — LETTER
2019       RE: Mona Wagner  : 1990   MRN: [id number]      Dear Colleague,    Thank you for referring your patient, Mona Wagner, to the [facility] EPILEPSY CARE at [facility]. Please see a copy of my visit note below.    [facility]      Patient:  Mona Wagner  :  1990   Age:  28 year old   Today's Office Visit:  2019    Epilepsy Data:    The patient had 2 seizures in 2014, one at home and one in the hospital.  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  She was on warfarin since 2014 for DVT from a prior dialysis catheter site.  She was put on Dilantin initially.  She was switched to Keppra when she was first seen here, due to teratogenicity of Dilantin.  Compliant with medication.   She has a pituitary microadenoma, which is followed up by an endocrinologist.       History of Present Illness:    Mona is here for a follow up on her seizures. The patient had a SAH and 2 seizures on 9/15/2014. The patient has been on LVT and did not have any further seizures. Head CT 18 showed no intracranial hemorrhage. EEG on 10/29/14 showed right frontotemporal slowing. EEG on 16 was normal. In the last visit, we decided to taper levetiracetam to off due to normal EEG and MRI and no seizures for almost 4 years. She has been off Keppra for the past month. She dis not have any seizures or symptoms concerning for recurrence of seizures. Overall she is doing well.   She is getting dialysis. She is in the list for kidney transplant and is getting closer to the end of the process.     Current Outpatient Medications   Medication Sig Dispense Refill     acetaminophen (TYLENOL) 325 MG tablet Take 2 tablets (650 mg) by mouth every 4 hours as needed for mild pain 100 tablet 1     B Complex-C-Zn-Folic Acid (DIALYVITE 800-ZINC 15) 0.8 MG TABS Take 1 tablet by mouth daily 90 tablet 3     cinacalcet (SENSIPAR) 30 MG tablet Take 1 tablet (30  mg) by mouth daily (with dinner)       diphenhydrAMINE (BENADRYL) 25 MG tablet Take 1-2 tablets (25-50 mg) by mouth every 6 hours as needed for itching or allergies 60 tablet 1     docusate sodium (COLACE) 100 MG tablet Take 1 tablet (100 mg) by mouth daily as needed for constipation       EMLA cream APPLY TO ACCESS SITE 30 TO 60 MINUTES PRIOR TO        DIALYSIS 30 g 11     HYDROcodone-acetaminophen (NORCO) 5-325 MG tablet Take 1 tablet by mouth at the end of dialysis run as needed for headaches  0     Lactobacillus Rhamnosus, GG, ([facility] & [employer] capsule Take 1 capsule by mouth 2 times daily 90 capsule 1     levETIRAcetam (KEPPRA) 250 MG tablet TAKE 1 AND 1/2 TABLETS DAILY. TAKE AN ADDITIONAL 250MG AFTER DIALYSIS ON MONDAY, WEDESDAY AND FRIDAY 175 tablet 0     levothyroxine (SYNTHROID/LEVOTHROID) 25 MCG tablet Take 1 tablet (25 mcg) Tuesday, Thursday, Saturday and Sunday and 1.5 tablets (37.5 mcg) on Monday, Wednesday and Friday every week. 105 tablet 1     multivitamin RENAL (NEPHROCAPS/TRIPHROCAPS) 1 MG capsule Take 1 capsule by mouth daily 30 capsule 3     norethindrone (MICRONOR) 0.35 MG tablet Take 1 tablet (0.35 mg) by mouth daily 28 tablet 11     ondansetron (ZOFRAN ODT) 4 MG ODT tab Take 1-2 tablets (4-8 mg) by mouth every 8 hours as needed for nausea 60 tablet 1     polyethylene glycol (MIRALAX/GLYCOLAX) powder Take 17 g (1 capful) by mouth daily 500 g 3     RENVELA 800 MG tablet Take 3 tablets (2,400 mg) by mouth 3 times daily (with meals) 1 with snacks 300 tablet 11     sennosides (SENOKOT) 8.6 MG tablet Take 1 tablet by mouth 2 times daily as needed for constipation 180 tablet 3     sevelamer (RENVELA) 800 MG tablet Take 1 tablet (800 mg) by mouth 3 times daily (with meals) and snacks       simethicone (MYLICON) 125 MG chewable tablet Take 1 tablet (125 mg) by mouth 4 times daily as needed for intestinal gas       amLODIPine (NORVASC) 10 MG tablet Take 1 tablet (10 mg) by mouth daily  (Patient not taking: Reported on 5/14/2019) 90 tablet 3     doxazosin (CARDURA) 1 MG tablet Take 1 tablet (1 mg) by mouth At Bedtime (Patient not taking: Reported on 5/14/2019) 90 tablet 3     EPINEPHrine (EPIPEN/ADRENACLICK/OR ANY BX GENERIC EQUIV) 0.3 MG/0.3ML injection 2-pack Inject 0.3 mLs (0.3 mg) into the muscle as needed for anaphylaxis (Patient not taking: Reported on 5/14/2019) 0.6 mL 3     hydrOXYzine (ATARAX) 50 MG tablet         Results for LIZZETTE PALMER (MRN [id number]) as of 7/11/2019 11:00   Ref. Range 9/28/2018 10:35   Keppra (Levetiracetam) Level Latest Ref Range: 12 - 46 ug/mL 21     Review of Systems:  Lethargy / Tiredness:  Yes  Nausea / Vomiting:  No  Double Vision:  No  Sleepiness:  Yes  Depression:  Yes  Slowed Cognitive Function:  No  Memory Problems:  No  Poor Balance:  No  Dizziness:  No  Appetite Changes:  No  Sleep Changes:  No  Behavioral Changes:  No  Skin: negative  Respiratory: No shortness of breath and No cough  Cardiovascular: negative  Have you experienced a traumatic fall since your last visit: NO    Other Issues:    Is patient safe to drive:  Recommended to be cautious in the next 3-4 months since she just came off the medication. She only drives two days a week and that is a very short distance.     Woman Care:   Patient is:    Sexually Active:  Yes  Type of Birth Control:  pill: mini pill  Pregnant:  no.  Planning to become pregnant:  No  Currently Breastfeeding:  No    Exam:    /79   Pulse 81   Wt 118 lb 6.4 oz (53.7 kg)   Breastfeeding? No   BMI 23.12 kg/m        Wt Readings from Last 5 Encounters:   07/11/19 118 lb 6.4 oz (53.7 kg)   07/09/19 119 lb 8 oz (54.2 kg)   05/14/19 117 lb (53.1 kg)   03/07/19 112 lb 8 oz (51 kg)   03/05/19 115 lb 6.4 oz (52.3 kg)     General Appearance: Alert, awake, cooperative, NAD  Gait:  steady  Attention Span:  Normal  Language:  No aphasia or dysarthria  Extraocular Movements:  Intact, no nystagmus  Coordination:  Normal  Facial  Strength:  Normal  Limb Strength:  No focal weakness     Assessment and Plan:    The patient has a history of seizures in 9/2014 with 2 probable secondary generalized seizures in the setting of right frontal SAH. The patient had been on Keppra and had no more seizures. Her last EEG and head CT have been normal. Due to low risk of recurrence we decided to taper Keppra to off. She has been off for the past month. She did not have a recurrence. I advised her to be cautious with driving during the next few months as she just came off the medication and although the risk of recurrence is low, it's not zero. I don't make a follow up appointment for her at this point. I'll be more than happy to see her in case she had any questions or concerns or had seizure-recurrence.       As described above, I met with the patient for 15 minutes and during this time counseling was greater than 50% of the visit time.  Theresa Sapp MD                        Again, thank you for allowing me to participate in the care of your patient.      Sincerely,    Theresa Sapp MD

## 2019-07-15 NOTE — PROGRESS NOTES
PATHOLOGY HLA CROSSMATCH CONSULTATION: DONOR/RECIPIENT VIRTUAL CROSSMATCH - Kidney  Consultation Date: July 10, 2019  Consultation Requested by: Dr. Paul Sibley  Regarding: Compatibility of  donor organ UNOS #[id number] from OPO: [place]OP with patient Mona Wagner  Findings: Regarding a virtual crossmatch between Mona Wagner and  donor listed above (match ID [id number]):  The most recent and peak patient sera were analyzed.  The patient has one (1) donor-specific antibody(ies)  (DSA) as listed in table below. No other antibodies listed with specificity against donor organ.      ANTIBODY MOST RECENT SERUM (mfi) 19 Peak Serum #1 (mfi)  17      Cw7 None 562          Record Review Indicates: I personally reviewed the most recent serum and the  peak serum/sera.  In addition, I analyzed 10 more sera:  The patient has only anti-C-locus antibodies against the donor organ.   Based on historical data from this hospital's histocompatibility lab, this donor organ is considered compatible because DSA to C-locus antigens rarely contribute to a positive lymphocyte crossmatch test; therefore, they were not considered in deriving the probability of positive crossmatch.    The results of this virtual XM are:   -most recent serum: Compatible  -peak #1:  Compatible    Disclaimer: Clinical judgement must take into account other factors, such as non-HLA antibodies not detected in the assay.   The VXM gives probabilities only.  The probability does not account for the potential for auto-antibodies that may be present in the patient's serum.  These autoantibodies may render the physical crossmatch falsely positive, and would be detected by an autologous crossmatch.  When possible, confirm findings with a prospective allogeneic and autologous flow crossmatches before going to transplant.    Seb Abbott PhD, [facility]  Medical Director, Immunology/Histocompatibility Laboratory  Pager: [phone]

## 2019-07-16 NOTE — PROGRESS NOTES
"Patient Name: Mona Wagner  : 1990  Age: 28 year old  MRN: [id number]  Date of Initial Social Work Evaluation: 13; Update 16; 2018     Patient on kidney transplant wait list active.  Met with Mona today to update psychosocial assessment.       Presenting Information   Living Situation: In [place], [place] with her cultural  Jt and his parents and siblings.  If not local, plans for short term stay:  N/A  Previous Functional Status: Independent  Cultural/Language/Spiritual Considerations: N/A     Support System  Primary Support Person Jt  Other support:  Mona's family who also live in [place], [place]  Plan for support in immediate post-transplant period: Jt     Health Care Directive  Decision Maker: Self  Alternate Decision Maker: Legally Mona's parents  Health Care Directive: Provided Copy and Provided education     Mental Health/Coping:   History of Mental Health: No known history  History of Chemical Health: No known history  Current status: Appropriate   Coping: Mona indicated when under stress she will \"just calm myself down\".  Services Needed/Recommended: None at this time.     Financial   Income: Mona is working part time and Jt is employed full time.  Impact of transplant on income: Should be able to return to work post transplant.  Insurance and medication coverage: Medicare, [place] and Part D through [employer]  Financial concerns: None at this time.  Resources needed: None at this time.     Assessment and recommendations and plan: Discussed post transplant requirements, insurance coverage post transplant and pregnancy.  Mona indicated understanding and encouraged patient to contact this writer with any questions.  Mona continues to be an appropriate transplant candidate.   Reviewed transplant education (Medicare, rehabilitation, donor issues, community/financial resources, and psych/family adjustment) as well as psychosocial risks of transplant. Provided patient with a copy of " post-transplant informational sheet that includes information on potential costs of medications, Medicare ESRD, post-transplant lodging, etc. Patient seemed to process information well. Appeared well informed, motivated, and able to follow post transplant requirements. Behavior was appropriate during interview. Has adequate income and insurance coverage. Adequate social support. No major contraindications noted for transplant. At this time, patient appears to understand the risks and benefits of transplant.

## 2019-07-18 ENCOUNTER — ANCILLARY PROCEDURE (OUTPATIENT)
Dept: ULTRASOUND IMAGING | Facility: CLINIC | Age: 29
End: 2019-07-18
Attending: PHYSICIAN ASSISTANT
Payer: MEDICARE

## 2019-07-18 DIAGNOSIS — N18.6 END STAGE RENAL DISEASE (H): ICD-10-CM

## 2019-07-31 DIAGNOSIS — M54.9 BACK PAIN, UNSPECIFIED BACK LOCATION, UNSPECIFIED BACK PAIN LATERALITY, UNSPECIFIED CHRONICITY: ICD-10-CM

## 2019-08-02 ENCOUNTER — ANESTHESIA EVENT (OUTPATIENT)
Dept: SURGERY | Facility: CLINIC | Age: 29
DRG: 652 | End: 2019-08-02
Payer: MEDICARE

## 2019-08-02 ENCOUNTER — RESULTS ONLY (OUTPATIENT)
Dept: OTHER | Facility: CLINIC | Age: 29
End: 2019-08-02

## 2019-08-02 ENCOUNTER — ORGAN (OUTPATIENT)
Dept: TRANSPLANT | Facility: CLINIC | Age: 29
End: 2019-08-02

## 2019-08-02 ENCOUNTER — DOCUMENTATION ONLY (OUTPATIENT)
Dept: TRANSPLANT | Facility: CLINIC | Age: 29
End: 2019-08-02

## 2019-08-02 ENCOUNTER — APPOINTMENT (OUTPATIENT)
Dept: GENERAL RADIOLOGY | Facility: CLINIC | Age: 29
DRG: 652 | End: 2019-08-02
Attending: PHYSICIAN ASSISTANT
Payer: MEDICARE

## 2019-08-02 ENCOUNTER — HOSPITAL ENCOUNTER (INPATIENT)
Facility: CLINIC | Age: 29
LOS: 9 days | Discharge: HOME HEALTH | DRG: 652 | End: 2019-08-12
Attending: TRANSPLANT SURGERY | Admitting: TRANSPLANT SURGERY
Payer: MEDICARE

## 2019-08-02 ENCOUNTER — APPOINTMENT (OUTPATIENT)
Dept: CT IMAGING | Facility: CLINIC | Age: 29
DRG: 652 | End: 2019-08-02
Attending: TRANSPLANT SURGERY
Payer: MEDICARE

## 2019-08-02 DIAGNOSIS — Z76.82 AWAITING ORGAN TRANSPLANT: Primary | ICD-10-CM

## 2019-08-02 DIAGNOSIS — K59.04 CHRONIC IDIOPATHIC CONSTIPATION: ICD-10-CM

## 2019-08-02 DIAGNOSIS — F41.9 ANXIETY: ICD-10-CM

## 2019-08-02 DIAGNOSIS — Z94.0 KIDNEY REPLACED BY TRANSPLANT: Primary | ICD-10-CM

## 2019-08-02 DIAGNOSIS — Z94.0 KIDNEY TRANSPLANTED: ICD-10-CM

## 2019-08-02 DIAGNOSIS — I10 ESSENTIAL HYPERTENSION: ICD-10-CM

## 2019-08-02 DIAGNOSIS — Z30.41 SURVEILLANCE OF PREVIOUSLY PRESCRIBED CONTRACEPTIVE PILL: ICD-10-CM

## 2019-08-02 DIAGNOSIS — Z76.82 AWAITING ORGAN TRANSPLANT: ICD-10-CM

## 2019-08-02 LAB
ABO + RH BLD: NORMAL
ABO + RH BLD: NORMAL
ALBUMIN SERPL-MCNC: 4.2 G/DL (ref 3.4–5)
ALP SERPL-CCNC: 65 U/L (ref 40–150)
ALT SERPL W P-5'-P-CCNC: 13 U/L (ref 0–50)
ANION GAP SERPL CALCULATED.3IONS-SCNC: 8 MMOL/L (ref 3–14)
APTT PPP: 33 SEC (ref 22–37)
AST SERPL W P-5'-P-CCNC: 4 U/L (ref 0–45)
B-HCG SERPL-ACNC: 1 IU/L (ref 0–5)
BASOPHILS # BLD AUTO: 0.1 10E9/L (ref 0–0.2)
BASOPHILS NFR BLD AUTO: 0.5 %
BILIRUB SERPL-MCNC: 0.8 MG/DL (ref 0.2–1.3)
BLD GP AB SCN SERPL QL: NORMAL
BLD PROD TYP BPU: NORMAL
BLOOD BANK CMNT PATIENT-IMP: NORMAL
BUN SERPL-MCNC: 22 MG/DL (ref 7–30)
CALCIUM SERPL-MCNC: 9.8 MG/DL (ref 8.5–10.1)
CHLORIDE SERPL-SCNC: 91 MMOL/L (ref 94–109)
CHOLEST SERPL-MCNC: 159 MG/DL
CO2 SERPL-SCNC: 35 MMOL/L (ref 20–32)
CREAT SERPL-MCNC: 6.11 MG/DL (ref 0.52–1.04)
DIFFERENTIAL METHOD BLD: ABNORMAL
EOSINOPHIL # BLD AUTO: 0 10E9/L (ref 0–0.7)
EOSINOPHIL NFR BLD AUTO: 0.4 %
ERYTHROCYTE [DISTWIDTH] IN BLOOD BY AUTOMATED COUNT: 12.9 % (ref 10–15)
GFR SERPL CREATININE-BSD FRML MDRD: 9 ML/MIN/{1.73_M2}
GLUCOSE SERPL-MCNC: 82 MG/DL (ref 70–99)
HBA1C MFR BLD: 4.8 % (ref 0–5.6)
HCT VFR BLD AUTO: 37 % (ref 35–47)
HDLC SERPL-MCNC: 62 MG/DL
HGB BLD-MCNC: 11.7 G/DL (ref 11.7–15.7)
IMM GRANULOCYTES # BLD: 0 10E9/L (ref 0–0.4)
IMM GRANULOCYTES NFR BLD: 0.2 %
INR PPP: 0.94 (ref 0.86–1.14)
LDLC SERPL CALC-MCNC: 86 MG/DL
LYMPHOCYTES # BLD AUTO: 1.3 10E9/L (ref 0.8–5.3)
LYMPHOCYTES NFR BLD AUTO: 14.7 %
MCH RBC QN AUTO: 31.5 PG (ref 26.5–33)
MCHC RBC AUTO-ENTMCNC: 31.6 G/DL (ref 31.5–36.5)
MCV RBC AUTO: 100 FL (ref 78–100)
MONOCYTES # BLD AUTO: 0.7 10E9/L (ref 0–1.3)
MONOCYTES NFR BLD AUTO: 7.3 %
NEUTROPHILS # BLD AUTO: 7 10E9/L (ref 1.6–8.3)
NEUTROPHILS NFR BLD AUTO: 76.9 %
NONHDLC SERPL-MCNC: 97 MG/DL
NRBC # BLD AUTO: 0 10*3/UL
NRBC BLD AUTO-RTO: 0 /100
NUM BPU REQUESTED: 2
PLATELET # BLD AUTO: 192 10E9/L (ref 150–450)
POTASSIUM SERPL-SCNC: 3.6 MMOL/L (ref 3.4–5.3)
PROT SERPL-MCNC: 8.6 G/DL (ref 6.8–8.8)
PROTOCOL CUTOFF: NORMAL
RBC # BLD AUTO: 3.71 10E12/L (ref 3.8–5.2)
SA1 CELL: NORMAL
SA1 COMMENTS: NORMAL
SA1 HI RISK ABY: NORMAL
SA1 MOD RISK ABY: NORMAL
SA1 TEST METHOD: NORMAL
SA2 CELL: NORMAL
SA2 COMMENTS: NORMAL
SA2 HI RISK ABY UA: NORMAL
SA2 MOD RISK ABY: NORMAL
SA2 TEST METHOD: NORMAL
SODIUM SERPL-SCNC: 134 MMOL/L (ref 133–144)
SPECIMEN EXP DATE BLD: NORMAL
TRIGL SERPL-MCNC: 59 MG/DL
UNACCEPTABLE ANTIGEN: NORMAL
UNOS CPRA: 25
WBC # BLD AUTO: 9.1 10E9/L (ref 4–11)

## 2019-08-02 PROCEDURE — 86665 EPSTEIN-BARR CAPSID VCA: CPT | Performed by: PHYSICIAN ASSISTANT

## 2019-08-02 PROCEDURE — 84702 CHORIONIC GONADOTROPIN TEST: CPT | Performed by: PHYSICIAN ASSISTANT

## 2019-08-02 PROCEDURE — 86923 COMPATIBILITY TEST ELECTRIC: CPT | Performed by: PHYSICIAN ASSISTANT

## 2019-08-02 PROCEDURE — 80061 LIPID PANEL: CPT | Performed by: PHYSICIAN ASSISTANT

## 2019-08-02 PROCEDURE — 86850 RBC ANTIBODY SCREEN: CPT | Performed by: PHYSICIAN ASSISTANT

## 2019-08-02 PROCEDURE — 25000132 ZZH RX MED GY IP 250 OP 250 PS 637: Mod: GY | Performed by: PHYSICIAN ASSISTANT

## 2019-08-02 PROCEDURE — 85610 PROTHROMBIN TIME: CPT | Performed by: PHYSICIAN ASSISTANT

## 2019-08-02 PROCEDURE — 40000065 ZZH STATISTIC EKG NON-CHARGEABLE

## 2019-08-02 PROCEDURE — 80053 COMPREHEN METABOLIC PANEL: CPT | Performed by: PHYSICIAN ASSISTANT

## 2019-08-02 PROCEDURE — 86705 HEP B CORE ANTIBODY IGM: CPT | Performed by: PHYSICIAN ASSISTANT

## 2019-08-02 PROCEDURE — G0499 HEPB SCREEN HIGH RISK INDIV: HCPCS | Performed by: PHYSICIAN ASSISTANT

## 2019-08-02 PROCEDURE — 86803 HEPATITIS C AB TEST: CPT | Performed by: PHYSICIAN ASSISTANT

## 2019-08-02 PROCEDURE — 40000556 ZZH STATISTIC PERIPHERAL IV START W US GUIDANCE

## 2019-08-02 PROCEDURE — 86644 CMV ANTIBODY: CPT | Performed by: PHYSICIAN ASSISTANT

## 2019-08-02 PROCEDURE — 87389 HIV-1 AG W/HIV-1&-2 AB AG IA: CPT | Performed by: PHYSICIAN ASSISTANT

## 2019-08-02 PROCEDURE — 74176 CT ABD & PELVIS W/O CONTRAST: CPT

## 2019-08-02 PROCEDURE — 86900 BLOOD TYPING SEROLOGIC ABO: CPT | Performed by: PHYSICIAN ASSISTANT

## 2019-08-02 PROCEDURE — 25000132 ZZH RX MED GY IP 250 OP 250 PS 637: Mod: GY | Performed by: SURGERY

## 2019-08-02 PROCEDURE — 86901 BLOOD TYPING SEROLOGIC RH(D): CPT | Performed by: PHYSICIAN ASSISTANT

## 2019-08-02 PROCEDURE — 83036 HEMOGLOBIN GLYCOSYLATED A1C: CPT | Performed by: PHYSICIAN ASSISTANT

## 2019-08-02 PROCEDURE — 85730 THROMBOPLASTIN TIME PARTIAL: CPT | Performed by: PHYSICIAN ASSISTANT

## 2019-08-02 PROCEDURE — 86645 CMV ANTIBODY IGM: CPT | Performed by: PHYSICIAN ASSISTANT

## 2019-08-02 PROCEDURE — 93010 ELECTROCARDIOGRAM REPORT: CPT | Performed by: INTERNAL MEDICINE

## 2019-08-02 PROCEDURE — 85025 COMPLETE CBC W/AUTO DIFF WBC: CPT | Performed by: PHYSICIAN ASSISTANT

## 2019-08-02 PROCEDURE — 36415 COLL VENOUS BLD VENIPUNCTURE: CPT | Performed by: PHYSICIAN ASSISTANT

## 2019-08-02 PROCEDURE — 71046 X-RAY EXAM CHEST 2 VIEWS: CPT

## 2019-08-02 RX ORDER — ACETAMINOPHEN 325 MG/1
650 TABLET ORAL EVERY 4 HOURS PRN
Qty: 100 TABLET | Refills: 3 | Status: SHIPPED | OUTPATIENT
Start: 2019-08-02 | End: 2020-10-21

## 2019-08-02 RX ORDER — LIDOCAINE 40 MG/G
CREAM TOPICAL
Status: DISCONTINUED | OUTPATIENT
Start: 2019-08-02 | End: 2019-08-12 | Stop reason: HOSPADM

## 2019-08-02 RX ORDER — CEFUROXIME SODIUM 1.5 G/16ML
1.5 INJECTION, POWDER, FOR SOLUTION INTRAVENOUS ONCE
Status: COMPLETED | OUTPATIENT
Start: 2019-08-02 | End: 2019-08-03

## 2019-08-02 RX ORDER — ACETAMINOPHEN 325 MG/1
975 TABLET ORAL ONCE
Status: COMPLETED | OUTPATIENT
Start: 2019-08-02 | End: 2019-08-02

## 2019-08-02 RX ORDER — SEVELAMER CARBONATE 800 MG/1
2400 TABLET, FILM COATED ORAL
Status: DISCONTINUED | OUTPATIENT
Start: 2019-08-03 | End: 2019-08-12 | Stop reason: HOSPADM

## 2019-08-02 RX ORDER — SIMETHICONE 125 MG
125 TABLET,CHEWABLE ORAL 4 TIMES DAILY PRN
Status: DISCONTINUED | OUTPATIENT
Start: 2019-08-02 | End: 2019-08-05 | Stop reason: DRUGHIGH

## 2019-08-02 RX ORDER — ACETAMINOPHEN AND CODEINE PHOSPHATE 120; 12 MG/5ML; MG/5ML
0.35 SOLUTION ORAL DAILY
Status: DISCONTINUED | OUTPATIENT
Start: 2019-08-03 | End: 2019-08-04

## 2019-08-02 RX ORDER — CINACALCET 30 MG/1
30 TABLET, FILM COATED ORAL
Status: DISCONTINUED | OUTPATIENT
Start: 2019-08-02 | End: 2019-08-08

## 2019-08-02 RX ADMIN — ACETAMINOPHEN 975 MG: 325 TABLET, FILM COATED ORAL at 21:13

## 2019-08-02 RX ADMIN — Medication 1 CAPSULE: at 21:56

## 2019-08-02 RX ADMIN — CINACALCET HYDROCHLORIDE 30 MG: 30 TABLET, COATED ORAL at 20:49

## 2019-08-02 RX ADMIN — Medication 37.5 MCG: at 21:55

## 2019-08-02 ASSESSMENT — MIFFLIN-ST. JEOR: SCORE: 1201.5

## 2019-08-02 ASSESSMENT — ACTIVITIES OF DAILY LIVING (ADL): PRIOR_FUNCTIONAL_LEVEL_COMMENT: IND

## 2019-08-02 ASSESSMENT — ENCOUNTER SYMPTOMS: SEIZURES: 1

## 2019-08-02 NOTE — LETTER
Transition Communication Hand-off for Care Transitions to Next Level of Care Provider    Name: Mona Wagner  : 1990  MRN #: [id number]  Primary Care Provider: Macarena Bowen     Primary Clinic: [facility] [address] [place] [address]     Reason for Hospitalization:  Kidney transplant  Kidney transplant candidate  Kidney transplanted  Admit Date/Time: 2019  6:36 PM  Discharge Date: 19  Payor Source: Payor: MEDICARE / Plan: MEDICARE / Product Type: Medicare /          Reason for Communication Hand-off Referral: Other s/p kidney transplant    Discharge Plan:  Home with home care     Concern for non-adherence with plan of care:   Y/N N  Discharge Needs Assessment:  Needs      Most Recent Value   Anticipated Changes Related to Illness  none          Follow-up plan:    Future Appointments   Date Time Provider Department Center   2019  7:00 AM UC SPEC INFUSION INPR [facility]   2019  8:00 AM Nakul Hill MD [facility]   8/15/2019  7:00 AM UC SPEC INFUSION INPR [facility]   8/15/2019  8:00 AM Nakul Hill MD INPR [facility]   2019  1:00 PM Dorian Johnson MD TXS [facility]   9/3/2019  9:30 AM UC LAB UCLAB [facility]   9/3/2019 10:00 AM Transplant, Uc Early Post UCMRE [facility]   2019  9:00 AM Johana Linda, NP TXS [facility]   10/7/2019 11:00 AM UC LAB UCLAB [facility]   10/7/2019 11:30 AM Transplant, Uc Early Post UCMRE [facility]   10/7/2019  1:00 PM [facility]   2019 10:00 AM GEORGIA Coles MD [facility]   12/3/2019 11:00 AM UC LAB UCLAB [facility]   12/3/2019 11:30 AM Transplant, Uc Early Post UCMRE [facility]   12/3/2019 12:00 PM Tracey Lake, MSW UCTXO [facility]   2/3/2020 10:00 AM UC LAB UCLAB [facility]   2/3/2020 10:30 AM Transplant, Uc Early Post UCMRE [facility]       Any outstanding tests or procedures:        Referrals     Future Labs/Procedures    Home care nursing referral     Comments:    [facility]. P: [phone] F: [phone]    RN skilled nursing visit. RN to assess vital signs and weight, respiratory and cardiac status, patients ability to take and record daily blood pressure, temp and weight, pain level and activity tolerance, incision for signs/symptoms of infection, hydration, nutrition and bowel status and home safety.  RN to teach medication management.  RN to provide morning lab draws per MD orders. Fax results to outpatient care coordinator: Laurence Vazquez P: [phone] F: [phone]    Your provider has ordered home care nursing services. If you have not been contacted within 2 days of your discharge please call the inpatient department phone number at [phone] .    Psychology Referral     Comments:    Health Psychology            Key Recommendations:      Kamini Gibbs    AVS/Discharge Summary is the source of truth; this is a helpful guide for improved communication of patient story

## 2019-08-03 ENCOUNTER — ANESTHESIA (OUTPATIENT)
Dept: SURGERY | Facility: CLINIC | Age: 29
DRG: 652 | End: 2019-08-03
Payer: MEDICARE

## 2019-08-03 ENCOUNTER — APPOINTMENT (OUTPATIENT)
Dept: GENERAL RADIOLOGY | Facility: CLINIC | Age: 29
DRG: 652 | End: 2019-08-03
Attending: ANESTHESIOLOGY
Payer: MEDICARE

## 2019-08-03 ENCOUNTER — APPOINTMENT (OUTPATIENT)
Dept: ULTRASOUND IMAGING | Facility: CLINIC | Age: 29
DRG: 652 | End: 2019-08-03
Attending: TRANSPLANT SURGERY
Payer: MEDICARE

## 2019-08-03 PROBLEM — Z94.0 KIDNEY TRANSPLANTED: Status: ACTIVE | Noted: 2019-08-03

## 2019-08-03 LAB
ANION GAP SERPL CALCULATED.3IONS-SCNC: 7 MMOL/L (ref 3–14)
BASE DEFICIT BLDV-SCNC: 0.2 MMOL/L
BLD PROD TYP BPU: NORMAL
BLD UNIT ID BPU: 0
BLOOD PRODUCT CODE: NORMAL
BPU ID: NORMAL
BUN SERPL-MCNC: 34 MG/DL (ref 7–30)
CA-I BLD-MCNC: 5 MG/DL (ref 4.4–5.2)
CALCIUM SERPL-MCNC: 8.3 MG/DL (ref 8.5–10.1)
CHLORIDE SERPL-SCNC: 100 MMOL/L (ref 94–109)
CO2 SERPL-SCNC: 24 MMOL/L (ref 20–32)
CREAT SERPL-MCNC: 7.71 MG/DL (ref 0.52–1.04)
ERYTHROCYTE [DISTWIDTH] IN BLOOD BY AUTOMATED COUNT: 13.1 % (ref 10–15)
GFR SERPL CREATININE-BSD FRML MDRD: 7 ML/MIN/{1.73_M2}
GLUCOSE BLD-MCNC: 111 MG/DL (ref 70–99)
GLUCOSE BLDC GLUCOMTR-MCNC: 134 MG/DL (ref 70–99)
GLUCOSE BLDC GLUCOMTR-MCNC: 143 MG/DL (ref 70–99)
GLUCOSE BLDC GLUCOMTR-MCNC: 148 MG/DL (ref 70–99)
GLUCOSE BLDC GLUCOMTR-MCNC: 164 MG/DL (ref 70–99)
GLUCOSE BLDC GLUCOMTR-MCNC: 172 MG/DL (ref 70–99)
GLUCOSE SERPL-MCNC: 125 MG/DL (ref 70–99)
HBA1C MFR BLD: 4.8 % (ref 0–5.6)
HCO3 BLDV-SCNC: 25 MMOL/L (ref 21–28)
HCT VFR BLD AUTO: 26.5 % (ref 35–47)
HGB BLD-MCNC: 8.5 G/DL (ref 11.7–15.7)
HGB BLD-MCNC: 8.9 G/DL (ref 11.7–15.7)
HGB BLD-MCNC: 9.1 G/DL (ref 11.7–15.7)
LACTATE BLD-SCNC: 1.9 MMOL/L (ref 0.7–2)
MAGNESIUM SERPL-MCNC: 1.3 MG/DL (ref 1.6–2.3)
MCH RBC QN AUTO: 32.2 PG (ref 26.5–33)
MCHC RBC AUTO-ENTMCNC: 32.1 G/DL (ref 31.5–36.5)
MCV RBC AUTO: 100 FL (ref 78–100)
O2/TOTAL GAS SETTING VFR VENT: 40 %
PCO2 BLDV: 39 MM HG (ref 40–50)
PH BLDV: 7.4 PH (ref 7.32–7.43)
PHOSPHATE SERPL-MCNC: 4.7 MG/DL (ref 2.5–4.5)
PLATELET # BLD AUTO: 113 10E9/L (ref 150–450)
PO2 BLDV: 57 MM HG (ref 25–47)
POTASSIUM BLD-SCNC: 4.1 MMOL/L (ref 3.4–5.3)
POTASSIUM SERPL-SCNC: 5 MMOL/L (ref 3.4–5.3)
POTASSIUM SERPL-SCNC: 6.4 MMOL/L (ref 3.4–5.3)
RBC # BLD AUTO: 2.64 10E12/L (ref 3.8–5.2)
SODIUM BLD-SCNC: 131 MMOL/L (ref 133–144)
SODIUM SERPL-SCNC: 132 MMOL/L (ref 133–144)
TRANSFUSION STATUS PATIENT QL: NORMAL
TRANSFUSION STATUS PATIENT QL: NORMAL
WBC # BLD AUTO: 9.9 10E9/L (ref 4–11)

## 2019-08-03 PROCEDURE — 81200002 ZZH ACQUISITION KIDNEY CADAVER

## 2019-08-03 PROCEDURE — 36592 COLLECT BLOOD FROM PICC: CPT | Performed by: TRANSPLANT SURGERY

## 2019-08-03 PROCEDURE — 90937 HEMODIALYSIS REPEATED EVAL: CPT

## 2019-08-03 PROCEDURE — 25000128 H RX IP 250 OP 636: Performed by: STUDENT IN AN ORGANIZED HEALTH CARE EDUCATION/TRAINING PROGRAM

## 2019-08-03 PROCEDURE — 84132 ASSAY OF SERUM POTASSIUM: CPT | Performed by: TRANSPLANT SURGERY

## 2019-08-03 PROCEDURE — 71000015 ZZH RECOVERY PHASE 1 LEVEL 2 EA ADDTL HR: Performed by: TRANSPLANT SURGERY

## 2019-08-03 PROCEDURE — 25800030 ZZH RX IP 258 OP 636: Performed by: SURGERY

## 2019-08-03 PROCEDURE — 25800030 ZZH RX IP 258 OP 636: Performed by: NURSE ANESTHETIST, CERTIFIED REGISTERED

## 2019-08-03 PROCEDURE — C2617 STENT, NON-COR, TEM W/O DEL: HCPCS | Performed by: TRANSPLANT SURGERY

## 2019-08-03 PROCEDURE — 25000128 H RX IP 250 OP 636: Performed by: TRANSPLANT SURGERY

## 2019-08-03 PROCEDURE — 0TY10Z0 TRANSPLANTATION OF LEFT KIDNEY, ALLOGENEIC, OPEN APPROACH: ICD-10-PCS | Performed by: TRANSPLANT SURGERY

## 2019-08-03 PROCEDURE — 85027 COMPLETE CBC AUTOMATED: CPT | Performed by: SURGERY

## 2019-08-03 PROCEDURE — 84132 ASSAY OF SERUM POTASSIUM: CPT | Performed by: STUDENT IN AN ORGANIZED HEALTH CARE EDUCATION/TRAINING PROGRAM

## 2019-08-03 PROCEDURE — 82330 ASSAY OF CALCIUM: CPT | Performed by: TRANSPLANT SURGERY

## 2019-08-03 PROCEDURE — 25000125 ZZHC RX 250: Performed by: NURSE ANESTHETIST, CERTIFIED REGISTERED

## 2019-08-03 PROCEDURE — 71045 X-RAY EXAM CHEST 1 VIEW: CPT

## 2019-08-03 PROCEDURE — 37000008 ZZH ANESTHESIA TECHNICAL FEE, 1ST 30 MIN: Performed by: TRANSPLANT SURGERY

## 2019-08-03 PROCEDURE — 40000170 ZZH STATISTIC PRE-PROCEDURE ASSESSMENT II: Performed by: TRANSPLANT SURGERY

## 2019-08-03 PROCEDURE — 71000014 ZZH RECOVERY PHASE 1 LEVEL 2 FIRST HR: Performed by: TRANSPLANT SURGERY

## 2019-08-03 PROCEDURE — 25000128 H RX IP 250 OP 636: Performed by: NURSE ANESTHETIST, CERTIFIED REGISTERED

## 2019-08-03 PROCEDURE — 84100 ASSAY OF PHOSPHORUS: CPT | Performed by: SURGERY

## 2019-08-03 PROCEDURE — 30243S1 TRANSFUSION OF NONAUTOLOGOUS GLOBULIN INTO CENTRAL VEIN, PERCUTANEOUS APPROACH: ICD-10-PCS | Performed by: SURGERY

## 2019-08-03 PROCEDURE — 27210794 ZZH OR GENERAL SUPPLY STERILE: Performed by: TRANSPLANT SURGERY

## 2019-08-03 PROCEDURE — 5A1D70Z PERFORMANCE OF URINARY FILTRATION, INTERMITTENT, LESS THAN 6 HOURS PER DAY: ICD-10-PCS | Performed by: SURGERY

## 2019-08-03 PROCEDURE — 25000125 ZZHC RX 250: Performed by: STUDENT IN AN ORGANIZED HEALTH CARE EDUCATION/TRAINING PROGRAM

## 2019-08-03 PROCEDURE — 93010 ELECTROCARDIOGRAM REPORT: CPT | Performed by: INTERNAL MEDICINE

## 2019-08-03 PROCEDURE — 12000004 ZZH R&B IMCU UMMC

## 2019-08-03 PROCEDURE — 36000062 ZZH SURGERY LEVEL 4 1ST 30 MIN - UMMC: Performed by: TRANSPLANT SURGERY

## 2019-08-03 PROCEDURE — 86706 HEP B SURFACE ANTIBODY: CPT | Performed by: STUDENT IN AN ORGANIZED HEALTH CARE EDUCATION/TRAINING PROGRAM

## 2019-08-03 PROCEDURE — 37000009 ZZH ANESTHESIA TECHNICAL FEE, EACH ADDTL 15 MIN: Performed by: TRANSPLANT SURGERY

## 2019-08-03 PROCEDURE — 76776 US EXAM K TRANSPL W/DOPPLER: CPT

## 2019-08-03 PROCEDURE — G0499 HEPB SCREEN HIGH RISK INDIV: HCPCS | Performed by: STUDENT IN AN ORGANIZED HEALTH CARE EDUCATION/TRAINING PROGRAM

## 2019-08-03 PROCEDURE — 83605 ASSAY OF LACTIC ACID: CPT | Performed by: TRANSPLANT SURGERY

## 2019-08-03 PROCEDURE — 85018 HEMOGLOBIN: CPT | Performed by: TRANSPLANT SURGERY

## 2019-08-03 PROCEDURE — 93005 ELECTROCARDIOGRAM TRACING: CPT

## 2019-08-03 PROCEDURE — 25000132 ZZH RX MED GY IP 250 OP 250 PS 637: Mod: GY | Performed by: ANESTHESIOLOGY

## 2019-08-03 PROCEDURE — 25000131 ZZH RX MED GY IP 250 OP 636 PS 637: Mod: GY | Performed by: SURGERY

## 2019-08-03 PROCEDURE — 83036 HEMOGLOBIN GLYCOSYLATED A1C: CPT | Performed by: SURGERY

## 2019-08-03 PROCEDURE — 25800030 ZZH RX IP 258 OP 636: Performed by: TRANSPLANT SURGERY

## 2019-08-03 PROCEDURE — 25000128 H RX IP 250 OP 636: Performed by: ANESTHESIOLOGY

## 2019-08-03 PROCEDURE — 80048 BASIC METABOLIC PNL TOTAL CA: CPT | Performed by: SURGERY

## 2019-08-03 PROCEDURE — 25000566 ZZH SEVOFLURANE, EA 15 MIN: Performed by: TRANSPLANT SURGERY

## 2019-08-03 PROCEDURE — 0T770DZ DILATION OF LEFT URETER WITH INTRALUMINAL DEVICE, OPEN APPROACH: ICD-10-PCS | Performed by: TRANSPLANT SURGERY

## 2019-08-03 PROCEDURE — 82947 ASSAY GLUCOSE BLOOD QUANT: CPT | Performed by: TRANSPLANT SURGERY

## 2019-08-03 PROCEDURE — 25000125 ZZHC RX 250: Performed by: TRANSPLANT SURGERY

## 2019-08-03 PROCEDURE — 00000146 ZZHCL STATISTIC GLUCOSE BY METER IP

## 2019-08-03 PROCEDURE — 36000064 ZZH SURGERY LEVEL 4 EA 15 ADDTL MIN - UMMC: Performed by: TRANSPLANT SURGERY

## 2019-08-03 PROCEDURE — 25000128 H RX IP 250 OP 636: Performed by: PHYSICIAN ASSISTANT

## 2019-08-03 PROCEDURE — 83735 ASSAY OF MAGNESIUM: CPT | Performed by: SURGERY

## 2019-08-03 PROCEDURE — 84295 ASSAY OF SERUM SODIUM: CPT | Performed by: TRANSPLANT SURGERY

## 2019-08-03 PROCEDURE — 25000132 ZZH RX MED GY IP 250 OP 250 PS 637: Mod: GY | Performed by: SURGERY

## 2019-08-03 PROCEDURE — 82803 BLOOD GASES ANY COMBINATION: CPT | Performed by: TRANSPLANT SURGERY

## 2019-08-03 DEVICE — STENT URETERAL PERCUFLEX PLUS 4.8FRX16CM M006175198050
Type: IMPLANTABLE DEVICE | Site: URETER | Status: NON-FUNCTIONAL
Removed: 2019-09-16

## 2019-08-03 RX ORDER — FENTANYL CITRATE 50 UG/ML
25-50 INJECTION, SOLUTION INTRAMUSCULAR; INTRAVENOUS
Status: DISCONTINUED | OUTPATIENT
Start: 2019-08-03 | End: 2019-08-03

## 2019-08-03 RX ORDER — FUROSEMIDE 10 MG/ML
INJECTION INTRAMUSCULAR; INTRAVENOUS PRN
Status: DISCONTINUED | OUTPATIENT
Start: 2019-08-03 | End: 2019-08-03

## 2019-08-03 RX ORDER — DEXTROSE MONOHYDRATE 25 G/50ML
25-50 INJECTION, SOLUTION INTRAVENOUS
Status: DISCONTINUED | OUTPATIENT
Start: 2019-08-03 | End: 2019-08-07

## 2019-08-03 RX ORDER — NICOTINE POLACRILEX 4 MG
15-30 LOZENGE BUCCAL
Status: DISCONTINUED | OUTPATIENT
Start: 2019-08-03 | End: 2019-08-07

## 2019-08-03 RX ORDER — CARDIOPLEG/ORGAN PRESERV NO.1 9-198-2-1
BOTTLE PERFUSION PRN
Status: DISCONTINUED | OUTPATIENT
Start: 2019-08-03 | End: 2019-08-03

## 2019-08-03 RX ORDER — GLYCOPYRROLATE 0.2 MG/ML
INJECTION, SOLUTION INTRAMUSCULAR; INTRAVENOUS PRN
Status: DISCONTINUED | OUTPATIENT
Start: 2019-08-03 | End: 2019-08-03

## 2019-08-03 RX ORDER — MAGNESIUM SULFATE 1 G/100ML
1 INJECTION INTRAVENOUS ONCE
Status: COMPLETED | OUTPATIENT
Start: 2019-08-03 | End: 2019-08-03

## 2019-08-03 RX ORDER — LABETALOL 20 MG/4 ML (5 MG/ML) INTRAVENOUS SYRINGE
10
Status: COMPLETED | OUTPATIENT
Start: 2019-08-03 | End: 2019-08-03

## 2019-08-03 RX ORDER — DEXTROSE MONOHYDRATE 25 G/50ML
25-50 INJECTION, SOLUTION INTRAVENOUS
Status: DISCONTINUED | OUTPATIENT
Start: 2019-08-03 | End: 2019-08-05

## 2019-08-03 RX ORDER — SODIUM CHLORIDE 9 MG/ML
INJECTION, SOLUTION INTRAVENOUS CONTINUOUS PRN
Status: DISCONTINUED | OUTPATIENT
Start: 2019-08-03 | End: 2019-08-03

## 2019-08-03 RX ORDER — ONDANSETRON 2 MG/ML
4 INJECTION INTRAMUSCULAR; INTRAVENOUS EVERY 30 MIN PRN
Status: DISCONTINUED | OUTPATIENT
Start: 2019-08-03 | End: 2019-08-03

## 2019-08-03 RX ORDER — NICOTINE POLACRILEX 4 MG
15-30 LOZENGE BUCCAL
Status: DISCONTINUED | OUTPATIENT
Start: 2019-08-03 | End: 2019-08-05

## 2019-08-03 RX ORDER — HEPARIN SODIUM 1000 [USP'U]/ML
INJECTION, SOLUTION INTRAVENOUS; SUBCUTANEOUS PRN
Status: DISCONTINUED | OUTPATIENT
Start: 2019-08-03 | End: 2019-08-03

## 2019-08-03 RX ORDER — ATORVASTATIN CALCIUM 10 MG/1
10 TABLET, FILM COATED ORAL DAILY
Status: DISCONTINUED | OUTPATIENT
Start: 2019-08-04 | End: 2019-08-12 | Stop reason: HOSPADM

## 2019-08-03 RX ORDER — HYDROMORPHONE HYDROCHLORIDE 1 MG/ML
.3-.5 INJECTION, SOLUTION INTRAMUSCULAR; INTRAVENOUS; SUBCUTANEOUS EVERY 5 MIN PRN
Status: DISCONTINUED | OUTPATIENT
Start: 2019-08-03 | End: 2019-08-03

## 2019-08-03 RX ORDER — DAPSONE 100 MG/1
100 TABLET ORAL DAILY
Status: DISCONTINUED | OUTPATIENT
Start: 2019-08-03 | End: 2019-08-05

## 2019-08-03 RX ORDER — OXYCODONE HYDROCHLORIDE 5 MG/1
5 TABLET ORAL EVERY 4 HOURS PRN
Status: DISCONTINUED | OUTPATIENT
Start: 2019-08-03 | End: 2019-08-04

## 2019-08-03 RX ORDER — HYDROMORPHONE HYDROCHLORIDE 1 MG/ML
.3-.5 INJECTION, SOLUTION INTRAMUSCULAR; INTRAVENOUS; SUBCUTANEOUS EVERY 30 MIN PRN
Status: DISCONTINUED | OUTPATIENT
Start: 2019-08-03 | End: 2019-08-03 | Stop reason: HOSPADM

## 2019-08-03 RX ORDER — DEXTROSE MONOHYDRATE 25 G/50ML
25 INJECTION, SOLUTION INTRAVENOUS ONCE
Status: DISCONTINUED | OUTPATIENT
Start: 2019-08-03 | End: 2019-08-05 | Stop reason: CLARIF

## 2019-08-03 RX ORDER — DEXAMETHASONE SODIUM PHOSPHATE 4 MG/ML
INJECTION, SOLUTION INTRA-ARTICULAR; INTRALESIONAL; INTRAMUSCULAR; INTRAVENOUS; SOFT TISSUE PRN
Status: DISCONTINUED | OUTPATIENT
Start: 2019-08-03 | End: 2019-08-03

## 2019-08-03 RX ORDER — OXYCODONE HYDROCHLORIDE 5 MG/1
5-10 TABLET ORAL EVERY 4 HOURS PRN
Status: DISCONTINUED | OUTPATIENT
Start: 2019-08-03 | End: 2019-08-04

## 2019-08-03 RX ORDER — SODIUM CHLORIDE 9 MG/ML
1000 INJECTION, SOLUTION INTRAVENOUS CONTINUOUS PRN
Status: DISCONTINUED | OUTPATIENT
Start: 2019-08-03 | End: 2019-08-08

## 2019-08-03 RX ORDER — ACETAMINOPHEN 325 MG/1
650 TABLET ORAL EVERY 4 HOURS
Status: DISCONTINUED | OUTPATIENT
Start: 2019-08-03 | End: 2019-08-07

## 2019-08-03 RX ORDER — HYDRALAZINE HYDROCHLORIDE 20 MG/ML
10 INJECTION INTRAMUSCULAR; INTRAVENOUS EVERY 4 HOURS PRN
Status: DISCONTINUED | OUTPATIENT
Start: 2019-08-03 | End: 2019-08-09

## 2019-08-03 RX ORDER — MANNITOL 20 G/100ML
INJECTION, SOLUTION INTRAVENOUS PRN
Status: DISCONTINUED | OUTPATIENT
Start: 2019-08-03 | End: 2019-08-03

## 2019-08-03 RX ORDER — ONDANSETRON 4 MG/1
4 TABLET, ORALLY DISINTEGRATING ORAL EVERY 30 MIN PRN
Status: DISCONTINUED | OUTPATIENT
Start: 2019-08-03 | End: 2019-08-03

## 2019-08-03 RX ORDER — PROPOFOL 10 MG/ML
INJECTION, EMULSION INTRAVENOUS PRN
Status: DISCONTINUED | OUTPATIENT
Start: 2019-08-03 | End: 2019-08-03

## 2019-08-03 RX ORDER — CALCIUM CHLORIDE 100 MG/ML
INJECTION INTRAVENOUS; INTRAVENTRICULAR PRN
Status: DISCONTINUED | OUTPATIENT
Start: 2019-08-03 | End: 2019-08-03

## 2019-08-03 RX ORDER — FENTANYL CITRATE 50 UG/ML
INJECTION, SOLUTION INTRAMUSCULAR; INTRAVENOUS PRN
Status: DISCONTINUED | OUTPATIENT
Start: 2019-08-03 | End: 2019-08-03

## 2019-08-03 RX ORDER — ONDANSETRON 2 MG/ML
4 INJECTION INTRAMUSCULAR; INTRAVENOUS EVERY 6 HOURS PRN
Status: DISCONTINUED | OUTPATIENT
Start: 2019-08-03 | End: 2019-08-12 | Stop reason: HOSPADM

## 2019-08-03 RX ORDER — ALBUTEROL SULFATE 0.83 MG/ML
2.5 SOLUTION RESPIRATORY (INHALATION) EVERY 4 HOURS PRN
Status: DISCONTINUED | OUTPATIENT
Start: 2019-08-03 | End: 2019-08-03

## 2019-08-03 RX ORDER — HYDRALAZINE HYDROCHLORIDE 20 MG/ML
2.5-5 INJECTION INTRAMUSCULAR; INTRAVENOUS EVERY 10 MIN PRN
Status: DISCONTINUED | OUTPATIENT
Start: 2019-08-03 | End: 2019-08-03

## 2019-08-03 RX ORDER — ONDANSETRON 2 MG/ML
INJECTION INTRAMUSCULAR; INTRAVENOUS PRN
Status: DISCONTINUED | OUTPATIENT
Start: 2019-08-03 | End: 2019-08-03

## 2019-08-03 RX ORDER — DEXTROSE MONOHYDRATE 100 MG/ML
INJECTION, SOLUTION INTRAVENOUS CONTINUOUS
Status: ACTIVE | OUTPATIENT
Start: 2019-08-03 | End: 2019-08-03

## 2019-08-03 RX ORDER — SODIUM CHLORIDE, SODIUM LACTATE, POTASSIUM CHLORIDE, CALCIUM CHLORIDE 600; 310; 30; 20 MG/100ML; MG/100ML; MG/100ML; MG/100ML
INJECTION, SOLUTION INTRAVENOUS CONTINUOUS PRN
Status: DISCONTINUED | OUTPATIENT
Start: 2019-08-03 | End: 2019-08-03

## 2019-08-03 RX ORDER — MYCOPHENOLATE MOFETIL 250 MG/1
750 CAPSULE ORAL
Status: DISCONTINUED | OUTPATIENT
Start: 2019-08-03 | End: 2019-08-12 | Stop reason: HOSPADM

## 2019-08-03 RX ORDER — NALOXONE HYDROCHLORIDE 0.4 MG/ML
.1-.4 INJECTION, SOLUTION INTRAMUSCULAR; INTRAVENOUS; SUBCUTANEOUS
Status: ACTIVE | OUTPATIENT
Start: 2019-08-03 | End: 2019-08-04

## 2019-08-03 RX ORDER — ESMOLOL HYDROCHLORIDE 10 MG/ML
INJECTION INTRAVENOUS PRN
Status: DISCONTINUED | OUTPATIENT
Start: 2019-08-03 | End: 2019-08-03

## 2019-08-03 RX ORDER — MAGNESIUM OXIDE 400 MG/1
400 TABLET ORAL
Status: DISCONTINUED | OUTPATIENT
Start: 2019-08-05 | End: 2019-08-06

## 2019-08-03 RX ORDER — NALOXONE HYDROCHLORIDE 0.4 MG/ML
.1-.4 INJECTION, SOLUTION INTRAMUSCULAR; INTRAVENOUS; SUBCUTANEOUS
Status: DISCONTINUED | OUTPATIENT
Start: 2019-08-03 | End: 2019-08-12 | Stop reason: HOSPADM

## 2019-08-03 RX ORDER — LIDOCAINE HYDROCHLORIDE 20 MG/ML
INJECTION, SOLUTION INFILTRATION; PERINEURAL PRN
Status: DISCONTINUED | OUTPATIENT
Start: 2019-08-03 | End: 2019-08-03

## 2019-08-03 RX ORDER — ONDANSETRON 2 MG/ML
4 INJECTION INTRAMUSCULAR; INTRAVENOUS EVERY 6 HOURS PRN
Status: DISCONTINUED | OUTPATIENT
Start: 2019-08-03 | End: 2019-08-03

## 2019-08-03 RX ORDER — SODIUM CHLORIDE, SODIUM LACTATE, POTASSIUM CHLORIDE, CALCIUM CHLORIDE 600; 310; 30; 20 MG/100ML; MG/100ML; MG/100ML; MG/100ML
INJECTION, SOLUTION INTRAVENOUS CONTINUOUS
Status: DISCONTINUED | OUTPATIENT
Start: 2019-08-03 | End: 2019-08-03

## 2019-08-03 RX ORDER — VALGANCICLOVIR 450 MG/1
450 TABLET, FILM COATED ORAL
Status: DISCONTINUED | OUTPATIENT
Start: 2019-08-05 | End: 2019-08-12 | Stop reason: HOSPADM

## 2019-08-03 RX ORDER — SODIUM CHLORIDE 450 MG/100ML
INJECTION, SOLUTION INTRAVENOUS CONTINUOUS PRN
Status: DISCONTINUED | OUTPATIENT
Start: 2019-08-03 | End: 2019-08-08

## 2019-08-03 RX ADMIN — ONDANSETRON 4 MG: 2 INJECTION INTRAMUSCULAR; INTRAVENOUS at 19:16

## 2019-08-03 RX ADMIN — MANNITOL 25 G: 20 INJECTION, SOLUTION INTRAVENOUS at 10:03

## 2019-08-03 RX ADMIN — FENTANYL CITRATE 25 MCG: 50 INJECTION, SOLUTION INTRAMUSCULAR; INTRAVENOUS at 12:20

## 2019-08-03 RX ADMIN — FENTANYL CITRATE 25 MCG: 50 INJECTION, SOLUTION INTRAMUSCULAR; INTRAVENOUS at 12:14

## 2019-08-03 RX ADMIN — ONDANSETRON 4 MG: 2 INJECTION INTRAMUSCULAR; INTRAVENOUS at 11:27

## 2019-08-03 RX ADMIN — GLYCOPYRROLATE 0.2 MG: 0.2 INJECTION, SOLUTION INTRAMUSCULAR; INTRAVENOUS at 10:04

## 2019-08-03 RX ADMIN — CALCIUM CHLORIDE 500 MG: 100 INJECTION, SOLUTION INTRAVENOUS at 10:03

## 2019-08-03 RX ADMIN — ACETAMINOPHEN 650 MG: 325 TABLET, FILM COATED ORAL at 14:00

## 2019-08-03 RX ADMIN — FENTANYL CITRATE 100 MCG: 50 INJECTION, SOLUTION INTRAMUSCULAR; INTRAVENOUS at 07:41

## 2019-08-03 RX ADMIN — ANTI-THYMOCYTE GLOBULIN (RABBIT) 100 MG: 5 INJECTION, POWDER, LYOPHILIZED, FOR SOLUTION INTRAVENOUS at 08:00

## 2019-08-03 RX ADMIN — DAPSONE 100 MG: 100 TABLET ORAL at 18:41

## 2019-08-03 RX ADMIN — HYDROMORPHONE HYDROCHLORIDE 0.5 MG: 1 INJECTION, SOLUTION INTRAMUSCULAR; INTRAVENOUS; SUBCUTANEOUS at 14:40

## 2019-08-03 RX ADMIN — FUROSEMIDE 40 MG: 10 INJECTION, SOLUTION INTRAVENOUS at 10:03

## 2019-08-03 RX ADMIN — SODIUM CHLORIDE 300 ML: 9 INJECTION, SOLUTION INTRAVENOUS at 21:23

## 2019-08-03 RX ADMIN — DEXTROSE MONOHYDRATE 500 MG/HR: 50 INJECTION, SOLUTION INTRAVENOUS at 08:00

## 2019-08-03 RX ADMIN — FENTANYL CITRATE 25 MCG: 50 INJECTION INTRAMUSCULAR; INTRAVENOUS at 12:44

## 2019-08-03 RX ADMIN — MYCOPHENOLATE MOFETIL 750 MG: 250 CAPSULE ORAL at 18:41

## 2019-08-03 RX ADMIN — OXYCODONE HYDROCHLORIDE 5 MG: 5 TABLET ORAL at 22:41

## 2019-08-03 RX ADMIN — HYDROMORPHONE HYDROCHLORIDE 0.3 MG: 1 INJECTION, SOLUTION INTRAMUSCULAR; INTRAVENOUS; SUBCUTANEOUS at 13:51

## 2019-08-03 RX ADMIN — SODIUM CHLORIDE: 9 INJECTION, SOLUTION INTRAVENOUS at 09:25

## 2019-08-03 RX ADMIN — ROCURONIUM BROMIDE 20 MG: 10 INJECTION INTRAVENOUS at 10:03

## 2019-08-03 RX ADMIN — FENTANYL CITRATE 50 MCG: 50 INJECTION, SOLUTION INTRAMUSCULAR; INTRAVENOUS at 07:22

## 2019-08-03 RX ADMIN — HEPARIN SODIUM 2000 UNITS: 1000 INJECTION, SOLUTION INTRAVENOUS; SUBCUTANEOUS at 10:03

## 2019-08-03 RX ADMIN — CALCIUM CHLORIDE 250 MG: 100 INJECTION, SOLUTION INTRAVENOUS at 10:10

## 2019-08-03 RX ADMIN — FENTANYL CITRATE 25 MCG: 50 INJECTION INTRAMUSCULAR; INTRAVENOUS at 12:30

## 2019-08-03 RX ADMIN — FENTANYL CITRATE 50 MCG: 50 INJECTION, SOLUTION INTRAMUSCULAR; INTRAVENOUS at 11:21

## 2019-08-03 RX ADMIN — ROCURONIUM BROMIDE 20 MG: 10 INJECTION INTRAVENOUS at 07:18

## 2019-08-03 RX ADMIN — ACETAMINOPHEN 650 MG: 325 TABLET, FILM COATED ORAL at 18:41

## 2019-08-03 RX ADMIN — SODIUM CHLORIDE: 9 INJECTION, SOLUTION INTRAVENOUS at 06:08

## 2019-08-03 RX ADMIN — LABETALOL 20 MG/4 ML (5 MG/ML) INTRAVENOUS SYRINGE 5 MG: at 12:33

## 2019-08-03 RX ADMIN — SODIUM CHLORIDE 250 ML: 9 INJECTION, SOLUTION INTRAVENOUS at 21:24

## 2019-08-03 RX ADMIN — DEXAMETHASONE SODIUM PHOSPHATE 6 MG: 4 INJECTION, SOLUTION INTRA-ARTICULAR; INTRALESIONAL; INTRAMUSCULAR; INTRAVENOUS; SOFT TISSUE at 07:10

## 2019-08-03 RX ADMIN — SODIUM CHLORIDE: 9 INJECTION, SOLUTION INTRAVENOUS at 11:25

## 2019-08-03 RX ADMIN — HYDROMORPHONE HYDROCHLORIDE 0.3 MG: 1 INJECTION, SOLUTION INTRAMUSCULAR; INTRAVENOUS; SUBCUTANEOUS at 13:22

## 2019-08-03 RX ADMIN — SUGAMMADEX 100 MG: 100 INJECTION, SOLUTION INTRAVENOUS at 11:50

## 2019-08-03 RX ADMIN — Medication: at 21:24

## 2019-08-03 RX ADMIN — MAGNESIUM SULFATE 1 G: 1 INJECTION INTRAVENOUS at 15:55

## 2019-08-03 RX ADMIN — CALCIUM CHLORIDE 250 MG: 100 INJECTION, SOLUTION INTRAVENOUS at 10:06

## 2019-08-03 RX ADMIN — HYDROMORPHONE HYDROCHLORIDE 0.2 MG: 1 INJECTION, SOLUTION INTRAMUSCULAR; INTRAVENOUS; SUBCUTANEOUS at 14:07

## 2019-08-03 RX ADMIN — HYDROMORPHONE HYDROCHLORIDE 0.5 MG: 1 INJECTION, SOLUTION INTRAMUSCULAR; INTRAVENOUS; SUBCUTANEOUS at 10:31

## 2019-08-03 RX ADMIN — HYDROMORPHONE HYDROCHLORIDE 0.5 MG: 1 INJECTION, SOLUTION INTRAMUSCULAR; INTRAVENOUS; SUBCUTANEOUS at 13:41

## 2019-08-03 RX ADMIN — DEXTROSE AND SODIUM CHLORIDE: 5; 900 INJECTION, SOLUTION INTRAVENOUS at 16:40

## 2019-08-03 RX ADMIN — FENTANYL CITRATE 100 MCG: 50 INJECTION, SOLUTION INTRAMUSCULAR; INTRAVENOUS at 06:22

## 2019-08-03 RX ADMIN — ROCURONIUM BROMIDE 20 MG: 10 INJECTION INTRAVENOUS at 08:55

## 2019-08-03 RX ADMIN — MIDAZOLAM 1 MG: 1 INJECTION INTRAMUSCULAR; INTRAVENOUS at 06:08

## 2019-08-03 RX ADMIN — CEFUROXIME 1.5 G: 1.5 INJECTION, POWDER, FOR SOLUTION INTRAVENOUS at 07:15

## 2019-08-03 RX ADMIN — FENTANYL CITRATE 25 MCG: 50 INJECTION INTRAMUSCULAR; INTRAVENOUS at 13:05

## 2019-08-03 RX ADMIN — FENTANYL CITRATE 25 MCG: 50 INJECTION, SOLUTION INTRAMUSCULAR; INTRAVENOUS at 11:57

## 2019-08-03 RX ADMIN — PROPOFOL 140 MG: 10 INJECTION, EMULSION INTRAVENOUS at 06:22

## 2019-08-03 RX ADMIN — OXYCODONE HYDROCHLORIDE 5 MG: 5 TABLET ORAL at 17:50

## 2019-08-03 RX ADMIN — ROCURONIUM BROMIDE 40 MG: 10 INJECTION INTRAVENOUS at 06:23

## 2019-08-03 RX ADMIN — MYCOPHENOLATE MOFETIL 1000 MG: 500 INJECTION, POWDER, LYOPHILIZED, FOR SOLUTION INTRAVENOUS at 08:00

## 2019-08-03 RX ADMIN — FENTANYL CITRATE 25 MCG: 50 INJECTION INTRAMUSCULAR; INTRAVENOUS at 12:54

## 2019-08-03 RX ADMIN — HYDROMORPHONE HYDROCHLORIDE 0.5 MG: 1 INJECTION, SOLUTION INTRAMUSCULAR; INTRAVENOUS; SUBCUTANEOUS at 15:34

## 2019-08-03 RX ADMIN — TACROLIMUS 1.5 MG: 1 CAPSULE ORAL at 18:41

## 2019-08-03 RX ADMIN — ACETAMINOPHEN 650 MG: 325 TABLET, FILM COATED ORAL at 22:41

## 2019-08-03 RX ADMIN — HYDROMORPHONE HYDROCHLORIDE 0.2 MG: 1 INJECTION, SOLUTION INTRAMUSCULAR; INTRAVENOUS; SUBCUTANEOUS at 14:24

## 2019-08-03 RX ADMIN — FUROSEMIDE 80 MG: 10 INJECTION, SOLUTION INTRAVENOUS at 11:04

## 2019-08-03 RX ADMIN — LIDOCAINE HYDROCHLORIDE 60 MG: 20 INJECTION, SOLUTION INFILTRATION; PERINEURAL at 06:22

## 2019-08-03 RX ADMIN — DEXTROSE AND SODIUM CHLORIDE: 5; 900 INJECTION, SOLUTION INTRAVENOUS at 12:43

## 2019-08-03 RX ADMIN — ESMOLOL HYDROCHLORIDE 10 MG: 10 INJECTION, SOLUTION INTRAVENOUS at 10:31

## 2019-08-03 RX ADMIN — ROCURONIUM BROMIDE 20 MG: 10 INJECTION INTRAVENOUS at 07:55

## 2019-08-03 RX ADMIN — SODIUM CHLORIDE: 9 INJECTION, SOLUTION INTRAVENOUS at 07:04

## 2019-08-03 RX ADMIN — MIDAZOLAM 1 MG: 1 INJECTION INTRAMUSCULAR; INTRAVENOUS at 06:20

## 2019-08-03 RX ADMIN — FENTANYL CITRATE 25 MCG: 50 INJECTION, SOLUTION INTRAMUSCULAR; INTRAVENOUS at 12:02

## 2019-08-03 ASSESSMENT — PAIN DESCRIPTION - DESCRIPTORS
DESCRIPTORS: ACHING;CONSTANT
DESCRIPTORS: ACHING;CONSTANT

## 2019-08-03 ASSESSMENT — MIFFLIN-ST. JEOR: SCORE: 1201.5

## 2019-08-03 ASSESSMENT — ACTIVITIES OF DAILY LIVING (ADL): ADLS_ACUITY_SCORE: 12

## 2019-08-03 NOTE — OR NURSING
Patient transferred to 6B Room 35-1. Surgeon saw patient in elevator bay. No concerns expressed to this RN. Receiving RN met patient and family in patient room. Incision/EARL reviewed.

## 2019-08-03 NOTE — ANESTHESIA PREPROCEDURE EVALUATION
Anesthesia Pre-Procedure Evaluation    Patient: Mona Wagner   MRN:     [id number] Gender:   female   Age:    28 year old :      1990        Preoperative Diagnosis: End Stage Renal Disease   Procedure(s):  TRANSPLANT, KIDNEY, RECIPIENT,  DONOR  BACKBENCH PREPARATION, ALLOGRAFT, KIDNEY     Past Medical History:   Diagnosis Date     Anemia in chronic kidney disease      Dialysis patient (H)      End stage renal disease on dialysis (H)      Hypertension      Hypothyroid      Pituitary adenoma (H)       Past Surgical History:   Procedure Laterality Date     CREATE FISTULA ARTERIOVENOUS UPPER EXTREMITY  2014    Procedure: CREATE FISTULA ARTERIOVENOUS UPPER EXTREMITY;  Left Wrist Arteriovenous Fistula Placement;  Surgeon: Shashi Castro MD;  Location: [facility] OR     RASTA/DIALYSIS CATHETER  12/10/2013               Anesthesia Evaluation     . Pt has had prior anesthetic. Type: MAC    No history of anesthetic complications          ROS/MED HX    ENT/Pulmonary:  - neg pulmonary ROS     Neurologic:     (+)seizures (in setting of SAH in , Keppra recently d/c'ed given 5 years seizure-free) last seizure:      Cardiovascular: Comment: Pulmonary valve infective endocarditis, s/p 4 weeks IV abx in 2019, most recent echo shows vegetation resolved    (+) ----. : . . . :. . Previous cardiac testing Echodate:19results:Global and regional left ventricular function is normal with an EF of 55-60%.  Global right ventricular function is normal.  No significant valvular abnormalities were noted.  Pulmonary artery systolic pressure is normal.  The inferior vena cava was normal in size with preserved respiratory  variability.  Trivial pericardial effusion is present.date: results: date: results: date: results:          METS/Exercise Tolerance:     Hematologic:  - neg hematologic  ROS       Musculoskeletal:  - neg musculoskeletal ROS       GI/Hepatic:  - neg GI/hepatic ROS       Renal/Genitourinary:  Comment: ESRD 2/2 IgA nephropathy, on dialysis since 2014    (+) chronic renal disease, type: ESRD, Pt requires dialysis, type: Hemodialysis, Pt has no history of transplant,       Endo: Comment: Pituitary adenoma, elevated prolactin    (+) thyroid problem hypothyroidism, .      Psychiatric:  - neg psychiatric ROS       Infectious Disease:   (+) Other Infectious Disease infective endocarditis in 1/2019, resolved      Malignancy:      - no malignancy   Other:    (+) no H/O Chronic Pain,no other significant disability                        PHYSICAL EXAM:   Mental Status/Neuro: A/A/O   Airway: Facies: Feasible  Mallampati: II  Mouth/Opening: Limited  TM distance: > 6 cm  Neck ROM: Full   Respiratory:   Resp. Rate: Normal     Resp. Effort: Normal      CV:    Comments:      Dental: Normal Dentition                LABS:  CBC:   Lab Results   Component Value Date    WBC 9.1 08/02/2019    WBC 6.1 01/06/2019    HGB 11.7 08/02/2019    HGB 8.7 (L) 01/15/2019    HCT 37.0 08/02/2019    HCT 27.1 (L) 01/15/2019     08/02/2019     (L) 01/06/2019     BMP:   Lab Results   Component Value Date     08/02/2019     04/26/2019    POTASSIUM 3.6 08/02/2019    POTASSIUM 4.8 04/26/2019    CHLORIDE 91 (L) 08/02/2019    CHLORIDE 95 04/26/2019    CO2 35 (H) 08/02/2019    CO2 31 04/26/2019    BUN 22 08/02/2019    BUN 78 (H) 04/26/2019    CR 6.11 (H) 08/02/2019    CR 14.00 (H) 04/26/2019    GLC 82 08/02/2019     (H) 04/26/2019     COAGS:   Lab Results   Component Value Date    PTT 33 08/02/2019    INR 0.94 08/02/2019    FIBR 311 02/21/2017     POC:   Lab Results   Component Value Date    BGM 80 03/16/2017    HCG negative 12/31/2013    HCGS Negative 01/06/2019     OTHER:   Lab Results   Component Value Date    LACT 1.1 01/06/2019    A1C 4.8 08/02/2019    SHAMIR 9.8 08/02/2019    PHOS 9.9 (H) 12/11/2013    MAG 2.1 01/06/2019    ALBUMIN 4.2 08/02/2019    PROTTOTAL 8.6 08/02/2019    ALT 13 08/02/2019    AST 4 08/02/2019     ALKPHOS 65 08/02/2019    BILITOTAL 0.8 08/02/2019    BILIDIRECT 0.2 12/03/2013    TSH 2.21 04/26/2019    T4 1.08 04/26/2019    T3 75 02/21/2017    CRP 11.2 (H) 01/15/2019     (H) 01/15/2019        Preop Vitals    BP Readings from Last 3 Encounters:   08/02/19 125/86   07/11/19 118/79   07/09/19 135/90    Pulse Readings from Last 3 Encounters:   07/11/19 81   07/09/19 67   05/14/19 86      Resp Readings from Last 3 Encounters:   08/02/19 20   03/05/19 16   01/29/19 18    SpO2 Readings from Last 3 Encounters:   08/02/19 100%   07/09/19 100%   03/05/19 100%      Temp Readings from Last 1 Encounters:   08/02/19 36.8  C (98.2  F) (Oral)    Ht Readings from Last 1 Encounters:   08/02/19 1.524 m (5')      Wt Readings from Last 1 Encounters:   08/02/19 54 kg (119 lb 0.8 oz)    Estimated body mass index is 23.25 kg/m  as calculated from the following:    Height as of this encounter: 1.524 m (5').    Weight as of this encounter: 54 kg (119 lb 0.8 oz).     LDA:  Peripheral IV 08/02/19 Left Lower forearm (Active)   Number of days: 0       Hemodialysis Vascular Access Arteriovenous fistula Left Forearm (Active)   Site Assessment WDL;Bruit present;Thrill present 8/2/2019  7:00 PM   Dressing Intervention Removed 8/2/2019  7:00 PM   Number of days:         Assessment:   ASA SCORE: 3    H&P: History and physical reviewed and following examination; no interval change.   Smoking Status:  Non-Smoker/Unknown   NPO Status: NPO Appropriate     Plan:   Anes. Type:  General   Pre-Medication: None   Induction:  IV (Standard)   Airway: ETT; Oral   Access/Monitoring: PIV; CVL   Maintenance: Balanced     Postop Plan:   Postop Pain: Opioids  Postop Sedation/Airway: Not planned  Disposition: Inpatient/Admit     PONV Management:   Adult Risk Factors: Female, Non-Smoker, Postop Opioids   Prevention: Ondansetron, Dexamethasone     CONSENT: Direct conversation   Plan and risks discussed with: Patient   Blood Products: Consented (ALL Blood  Products)                   Candice Lamas MD

## 2019-08-03 NOTE — TELEPHONE ENCOUNTER
TRANSPLANT OR REPORT    Organ: Kidney  Laterality (if known): Left  Organ Location: Local    UN ID: [id number]  Donor OR Time: 5:00 PM  Expected/Actual Cross Clamp Time: 5:17 PM  Expected Organ Arrival Time: 5:00 AM    Surgeon: Finger  Time in OR: 6:00 AM  Time in 3C (N/A for LI): 5:00 AM    Recipient Details  Admission ETA: Admitted  Unit: 7A  Isolation: No  Latex Allergy: No  : No  Diagnosis: ESRD    Liver Transplants  Bypass: NA  Hemodialysis: NA  ~ Medicine Renal Staff: BRIAN  ~ CRRT Resource Nurse: BRIAN  (Telephone Number for CRRT [phone])    Kidney/Panc Transplants  XM Status (Need to wait for XM?): xm completed    Liver or KP/PA Recipients:  Can Vessels be Banked: BRIAN      Transplant Coordinator Contact Info: Deana [phone]      Vessel Bank Information  Transplant hospitals must not store a donor s extra vessels if the donor has tested positive for any of the following:   - HIV by antibody, antigen, or nucleic acid test (OLVIN)   - Hepatitis B surface antigen (HBsAg)   - Hepatitis B (HBV) by OLVIN   - Hepatitis C (HCV) by antibody or OLVIN     Extra vessels from donors that do not test positive for HIV, HBV, or HCV as above may be stored

## 2019-08-03 NOTE — BRIEF OP NOTE
[facility], [place]    Brief Operative Note    Pre-operative diagnosis: End Stage Renal Disease  Post-operative diagnosis same  Procedure: Procedure(s):  TRANSPLANT, KIDNEY, RECIPIENT,  DONOR with Ureteral Stent Placement  BACKBENCH PREPARATION, ALLOGRAFT, KIDNEY  Surgeon: Surgeon(s) and Role:     * Dorian Johnson MD - Primary     * Chester Keating MD - Fellow - Assisting  Anesthesia: General   Estimated blood loss: 200 ml  Drains: Chang-Barton in right lower quadrant  Specimens: * No specimens in log *  Findings:   the donor kidney had 3 arteries and 1 vein, we reconstructed the arteries into one common patch.  Complications: None.  Implants:    Implant Name Type Inv. Item Serial No.  Lot No. LRB No. Used   STENT URETERAL PERCUFLEX PLUS [id number] Stent STENT URETERAL PERCUFLEX PLUS [id number]  [employer] [place] [id number] N/A 1

## 2019-08-03 NOTE — OP NOTE
Transplant Surgery  Operative Note     Procedure date:  19    Preoperative diagnosis:  End Stage renal failure due to IgA nephropathy    Postoperative diagnosis:  Same    Procedure:  1. Left kidney transplant,   - Cardiac Death, Right  iliac fossa, with arterial reconstruction. A J-J ureteral stent was placed.  2. Kidney allograft preparation on Back Table    Surgeon:  ANDI CHO    Fellow/Assistant:  Chester Keating MD, fellow. There was no qualified resident to assist with this procedure.    Anesthesia:  General    Specimen:  None    Drains:  Chang-Barton drain in right lower quadrant    Urine output:  minimal    Estimated blood loss:  200 ml     Indication: The patient has End Stage renal failure due to IgA nephropathy and received an organ offer for a  - Cardiac Death kidney allograft. After discussing the risks and benefits of proceeding, the patient agreed to proceed with surgery and provided informed consent.  Findings: Integrity of recipient artery: normal  Intraoperative Events: none    Final ABO/Crossmatch verification: After the donor organ arrived to the operating room and prior to anastomosis, I participated in the transplant pre-verification upon organ receipt timeout by visually verifying the donor ID, organ and laterality, donor blood type, recipient unique identifier, recipient blood type, and that the donor and recipient are blood type compatible. The crossmatch was done prospectively; the T cell flow crossmatch result was negative and B cell flow crossmatch result was negative prior to anastomosis.  The patient received Thymoglobulin on induction.    Donor Organ Information:   Donor UNOS ID:  [id number]    Donor arterial clamp on:  2019  5:12 PM    Total ischemic time:  1029 min    Cold ischemic time:  964 min    Warm ischemic time:  65 min    Preservation fluid:  UW Solution     Back Table Details:   Procedure:  Bench preparation of the kidney allograft for  transplantation with arterial reconstruction    Surgeon:  ANDI CHO    Faculty Co-Surgeon:  ANDI CHO    Fellow/Assistant:  Chester Keating MD     Donor arrival to recipient room:  8/3/2019  6:13 AM    Graft injury:  none    Graft biopsy:  none    Organ received on:  Pump    Pump resistance:  0.29    Pump flow:  104    Arterial anatomy:  3 arteries    Donor arterial quality:  good    Venous anatomy:  1 vein    Ureteral anatomy:  1 ureter    Any reconstruction:  yes, the 3 arteries were sutured into a common patch    Artery:  normal    Vein:  normal     Complications: None.    Findings: none    Back Table Preparation:  The donor kidney was received and inspected. It had been flushed with UW. The graft was prepared on the back table by removing perinephric fat and ligating venous tributaries and lymphatics. The ureter was also cleaned of excess tissue. If required, reconstruction was performed as detailed above. The kidney was stored in iced cold preservation solution until ready for transplantation. Faculty was present for the critical portions of the procedure.    Operative Procedure:   Arterial anastomosis start:  8/3/2019  9:16 AM    Arterial unclamp:  8/3/2019 10:21 AM    Extra vessels used:   none      The patient was brought to the operating room, placed in a supine position, and a time out was performed. Sequential compression devices were placed on both lower extremities and general endotracheal anesthesia was induced.  The patient was given IV antibiotics and a Ramirez catheter. A central line was placed by Anesthesia service. The abdomen was then shaved, prepped, and draped in the usual sterile fashion.  An incision was made in the right lower quadrant and carried down through the subcutaneous tissue and the abdominal wall fascia. The epigastric vessels were ligated in continuity, divided and secured with surgical clips. The right iliac artery and vein were exposed. The retractor system  was placed and the lymphatics overlying the vessels were serially ligated and divided.     The patient was heparinized. We applied atraumatic vascular clamps and the donor kidney was brought to the operative field. We made a venotomy and the renal vein was anastomosed to the recipient right renal vein in an end-to-side fashion. An arteriotomy was made and the three donor renal arteries that were sewn into a common patch were anastomosed to the recipient right common iliac artery  in an end to side fashion. The patient was simultaneously loaded with IV mannitol, Lasix and volume. The renal artery was protected and the clamps were removed. After several cardiac cycles, we opened the renal artery and the kidney had good reperfusion and was firm but later on became soft.    The transplant ureter was managed by creating a Lich-Gregoir anastomosis with absorbable suture. A stent was placed across the anastomosis. The kidney made minimal urine prior to implantation.    Hemostasis was obtained, the anastomoses inspected, and the kidney placed in the iliac fossa. After placement, the vessel lay was inspected and found to be acceptable. The kidney position was adequate. The field was irrigated with antibiotic solution. A drain was placed. The retractor was removed and the abdominal wall fascia reapproximated. Subcutaneous tissues were irrigated and hemostasis obtained.  The skin was reapproximated with running subcuticular stitch and dermabond was applied.   All needle, sponge and instrument counts were correct x 2. The patient was awakened, extubated, and transferred to PACU for post-op monitoring. Faculty was present for key portions of the procedure.    ATTESTATION:    I was present during the key portions of the procedure, and I was immediately available for the entire procedure between opening and closing.    Dorian Johnson MD, PhD  Assistant Professor of Surgery

## 2019-08-03 NOTE — ANESTHESIA CARE TRANSFER NOTE
Patient: Mona Wagner    Procedure(s):  TRANSPLANT, KIDNEY, RECIPIENT,  DONOR with Ureteral Stent Placement  BACKBENCH PREPARATION, ALLOGRAFT, KIDNEY    Diagnosis: End Stage Renal Disease  Diagnosis Additional Information: No value filed.    Anesthesia Type:   General     Note:  Airway :Nasal Cannula  Patient transferred to:PACU  Comments: Pt. Pink and breathing spontaneously.  Vitals stable.  Report given to oncoming nurse.  Transfer of care occurred.Handoff Report: Identifed the Patient, Identified the Reponsible Provider, Reviewed the pertinent medical history, Discussed the surgical course, Reviewed Intra-OP anesthesia mangement and issues during anesthesia, Set expectations for post-procedure period and Allowed opportunity for questions and acknowledgement of understanding      Vitals: (Last set prior to Anesthesia Care Transfer)    CRNA VITALS  8/3/2019 1143 - 8/3/2019 1224      8/3/2019             Resp Rate (observed):  4  (Abnormal)                 Electronically Signed By: ALCIDES Clement CRNA  August 3, 2019  12:24 PM

## 2019-08-03 NOTE — OR NURSING
"\" Pt: Mona Wagner. PACU. Labs and ultrasound now resulted. No UOP since arrival. Please call me with any concerns/new orders. Thank you. #[id number]\"    Page sent to Dr. Keating at this time.  "

## 2019-08-03 NOTE — OR NURSING
Nephrologist present. Spoke with patient and reviewed ultrasound. Irrigated brooks with sterile water. Good return. No clots present.

## 2019-08-03 NOTE — PROGRESS NOTES
Transfer  Transferred from: PACU   Via: bed with PACU nurses and family.   Reason for transfer:Pt appropriate for 6B- kidney transplant post-op.   Family: Aware of transfer  Belongings: Received with pt  Chart: Received with pt  2 RN Skin Assessment Completed By:  Kemi AWAN RN & Leena CARPENTER RN .   Report received from:  ANDRADE TALAMANTES PACU nurse   Pt status: alert and oriented but sleepy admitted to floor from PACU . Oriented her to , floor , her room , staff and call light and routine. She is able to use call light to let her needs known.

## 2019-08-03 NOTE — PLAN OF CARE
/86 (BP Location: Right arm)   Temp 98.2  F (36.8  C) (Oral)   Resp 20   Ht 1.524 m (5')   Wt 54 kg (119 lb 0.8 oz)   SpO2 100%   BMI 23.25 kg/m  .    [id number]: Pt arrived from HD for pre-op for kidney transplant scheduled for 0500 tomorrow (8/3) d/t ESRD of unknown etiology. Pt had family visiting this evening,  (Jt) is staying overnight. AVSS on RA.   Neuro: WDL.  Cardiac: WDL.  Resp: WDL.  GI/: Pt is anuric. Last BM this AM.   Diet/Appetite: On regular diet, ate 100% of dinner brought from home. Pt is NPO @ 0000.  Endocrine: N/A.  Skin: WDL.  Access: PIV x1 SL'd. L functioning forearm AV fistula.  Drains: N/A.  Activity: Pt up independently.  Pain: Pt c/o HA (states she usually gets these after HD) that responded well to tylenol.  Plan: Pt had one pre-op shower this evening, will need one more shower tonight, pt is aware of this (NOTE - pt is allergic to chlorhexidine so is using chloroxylenol 3% soap and tolerated this without any rxn). Will continue with plan of care and notify team of any changes.?

## 2019-08-03 NOTE — H&P
Transplant Surgery - H&P Note  2019    Assessment & Recommendations: Mona Wagner has renal failure due to IgA Nephropathy. She makes no urine and has been on hemodialysis for 5 years. She presents today for a  donor kidney transplant.    -Admit to 7A  -labs  -renal diet now, NPO at midnight  -CT abd/pelv to evaluate anatomy  -will obtain consent      Medical Decision Making: Low  Admit 34023 (straight-forward/low level decision making)    MAR/Fellow/Resident Provider: Chester Keating    Faculty: Dorian Johnson M.D., Ph.D.    __________________________________________________________________    Admitted 2019 for kidney transplant    ROS:   A 10-point review of systems was negative except as noted above.    Curent Meds:    acetaminophen  975 mg Oral Once     anti-thymocyte globulin  2 mg/kg Intravenous Once     cefuroxime (ZINACEF) IV  1.5 g Intravenous Once     methylPREDNISolone  500 mg Intravenous Once     mycophenolate mofetil  1,000 mg Intravenous Once     sodium chloride (PF)  3 mL Intracatheter Q8H       Physical Exam:     Admit Weight: 54 kg (119 lb 0.8 oz)    Current vitals:   BP (!) 132/92   Temp 98.6  F (37  C) (Oral)   Resp 16   Ht 1.524 m (5')   Wt 54 kg (119 lb 0.8 oz)   SpO2 100%   BMI 23.25 kg/m      Vital sign ranges:    Temp:  [98.6  F (37  C)] 98.6  F (37  C)  Heart Rate:  [109] 109  Resp:  [16] 16  BP: (132)/(92) 132/92  SpO2:  [100 %] 100 %  Patient Vitals for the past 24 hrs:   BP Temp Temp src Heart Rate Resp SpO2 Height Weight   19 1843 (!) 132/92 98.6  F (37  C) Oral 109 16 100 % 1.524 m (5') 54 kg (119 lb 0.8 oz)     General Appearance: in no apparent distress.   Skin: normal, warm, no suspicious lesions or rashes  Heart: regular rate and rhythm  Lungs: clear to auscultation  Abdomen: The abdomen is flat, and  non-tender and non-distended  Extremities: edema: absent. Palpable femoral pulses bilaterally    Data:   CMPNo lab results found in last 7 days.  CBCNo lab  results found in last 7 days.  CoagsNo lab results found in last 7 days.    Invalid input(s): XA   Urinalysis  Recent Labs   Lab Test 12/11/13  1915 12/10/13  2050 05/15/13  1951   COLOR  --  Straw Dark Yellow   APPEARANCE  --  Clear Slightly Cloudy   URINEGLC  --  70* Negative   URINEBILI  --  Negative Negative   URINEKETONE  --  Negative Negative   SG  --  1.008 1.011   UBLD  --  Small* Moderate*   URINEPH  --  6.5 6.0   PROTEIN  --  100* 300*   NITRITE  --  Negative Negative   LEUKEST  --  Negative Large*   RBCU  --  2 19*   WBCU  --  9* 151*   UTPG 7.42*  --   --        Virology:  CMV IgG Antibody   Date Value Ref Range Status   12/26/2013 >10.00  Positive for anti-CMV IgG U/mL Final     EBV VCA IgG Antibody   Date Value Ref Range Status   12/26/2013 57.80 U/mL Final     Comment:     Positive, suggests immunologic exposure.     Hepatitis C Antibody   Date Value Ref Range Status   12/26/2013 Negative NEG Final     Hep B Surface Noemy   Date Value Ref Range Status   12/12/2013 34.4  Final     Comment:     Reactive, Patient is considered to be immune to infection with hepatitis B   when   the value is greater than or equal to 12.0 mlU/mL.

## 2019-08-03 NOTE — PLAN OF CARE
BP (!) 131/92 (BP Location: Right arm)   Temp 98.7  F (37.1  C) (Oral)   Resp 16   Ht 1.524 m (5')   Wt 54 kg (119 lb 0.8 oz)   SpO2 98%   BMI 23.25 kg/m      Patient with slightly high diastolic pressures (90s) OVSS on RA, afebrile. No c/o pain, nausea or SOB. NPO @ 0000. R PIV SL; L active fistula. Anuric on HD; LBM 8/2. Up ad aron. 2nd pre-op shower completed with chloroxylenol 3% soap. Plans for DDKT this AM, pt left unit around 0500.   Will continue with POC and notify MD with changes or concerns.

## 2019-08-03 NOTE — ANESTHESIA PROCEDURE NOTES
Central Line Procedure Note  Staff:     Anesthesiologist:  Pierre Mcclure MD    Resident/CRNA:  Brody Espinoza MD    Central line placed by Resident/CRNA in the presence of a teaching physician    Location: In OR after induction  Procedure Start/Stop Times:     patient identified, IV checked, site marked, risks and benefits discussed, informed consent, monitors and equipment checked, pre-op evaluation and at physician/surgeon's request      Correct Patient: Yes      Correct Position: Yes      Correct Site: Yes      Correct Procedure: Yes      Correct Laterality:  Yes    Site Marked:  Yes  Line Placement:     Procedure:  Central Line    Insertion laterality:  Right    Insertion site:  Internal Jugular    Position:  Trendelenburg      Maximal Sterile Barriers: All elements of maximal sterile barrier technique followed      (Maximal sterile barriers include:   Sterile gown, Sterile Gloves, Mask, Cap, Whole body draped, hand hygiene and acceptable skin prep).Skin Prep: Betadine         Injection Technique:  Ultrasound guided and Seldinger Technique    Sterile Ultrasound Technique:  Sterile probe cover and Sterile gel    Vein evaluated via U/S for patency/adequacy of catheter insertion and is adequate.  Using realtime U/S imaging the vein was punctured, and needle was observed entering vein on U/S      A permanent image is NOT entered into the patient's record.      Local skin infiltration:  None    Catheter size:  7 Fr, 3 lumen, 20 cm    Catheter length at skin (cm):  15    Cath secured with: suture      Dressing:  Tegaderm    Complications:  None obvious    Blood aspirated all lumens: Yes      All Lumens Flushed: Yes      Verification method:  X-ray and Placement to be verified post-op  Assessment/Narrative:      Patient allergic to chlorhexidine, biopatch was not placed as it contains chlorhexidine.

## 2019-08-03 NOTE — OR NURSING
Kidney transplant surgeon at bedside. Dr. Johnson also present. Aware of UOP and VS. Assessed incision. Acknoledged CXR completiton. Dr. Coulter reviewed CXR. OK to utilize TLC. Ultrasound ordered and called. No answer in radiology. Labs drawn and fluid started per order.

## 2019-08-04 ENCOUNTER — DOCUMENTATION ONLY (OUTPATIENT)
Dept: TRANSPLANT | Facility: CLINIC | Age: 29
End: 2019-08-04

## 2019-08-04 ENCOUNTER — APPOINTMENT (OUTPATIENT)
Dept: ULTRASOUND IMAGING | Facility: CLINIC | Age: 29
DRG: 652 | End: 2019-08-04
Attending: TRANSPLANT SURGERY
Payer: MEDICARE

## 2019-08-04 ENCOUNTER — APPOINTMENT (OUTPATIENT)
Dept: GENERAL RADIOLOGY | Facility: CLINIC | Age: 29
DRG: 652 | End: 2019-08-04
Attending: TRANSPLANT SURGERY
Payer: MEDICARE

## 2019-08-04 LAB
ANION GAP SERPL CALCULATED.3IONS-SCNC: 8 MMOL/L (ref 3–14)
ANION GAP SERPL CALCULATED.3IONS-SCNC: 9 MMOL/L (ref 3–14)
BASOPHILS # BLD AUTO: 0 10E9/L (ref 0–0.2)
BASOPHILS NFR BLD AUTO: 0 %
BLD PROD TYP BPU: NORMAL
BLD UNIT ID BPU: 0
BLOOD PRODUCT CODE: NORMAL
BPU ID: NORMAL
BUN SERPL-MCNC: 24 MG/DL (ref 7–30)
BUN SERPL-MCNC: 36 MG/DL (ref 7–30)
CALCIUM SERPL-MCNC: 9 MG/DL (ref 8.5–10.1)
CALCIUM SERPL-MCNC: 9.1 MG/DL (ref 8.5–10.1)
CHLORIDE SERPL-SCNC: 99 MMOL/L (ref 94–109)
CHLORIDE SERPL-SCNC: 99 MMOL/L (ref 94–109)
CO2 SERPL-SCNC: 24 MMOL/L (ref 20–32)
CO2 SERPL-SCNC: 24 MMOL/L (ref 20–32)
CREAT SERPL-MCNC: 5.9 MG/DL (ref 0.52–1.04)
CREAT SERPL-MCNC: 7.58 MG/DL (ref 0.52–1.04)
DIFFERENTIAL METHOD BLD: ABNORMAL
EOSINOPHIL # BLD AUTO: 0 10E9/L (ref 0–0.7)
EOSINOPHIL NFR BLD AUTO: 0 %
ERYTHROCYTE [DISTWIDTH] IN BLOOD BY AUTOMATED COUNT: 13.2 % (ref 10–15)
GFR SERPL CREATININE-BSD FRML MDRD: 7 ML/MIN/{1.73_M2}
GFR SERPL CREATININE-BSD FRML MDRD: 9 ML/MIN/{1.73_M2}
GLUCOSE BLDC GLUCOMTR-MCNC: 101 MG/DL (ref 70–99)
GLUCOSE BLDC GLUCOMTR-MCNC: 107 MG/DL (ref 70–99)
GLUCOSE BLDC GLUCOMTR-MCNC: 109 MG/DL (ref 70–99)
GLUCOSE BLDC GLUCOMTR-MCNC: 113 MG/DL (ref 70–99)
GLUCOSE BLDC GLUCOMTR-MCNC: 114 MG/DL (ref 70–99)
GLUCOSE BLDC GLUCOMTR-MCNC: 118 MG/DL (ref 70–99)
GLUCOSE BLDC GLUCOMTR-MCNC: 119 MG/DL (ref 70–99)
GLUCOSE BLDC GLUCOMTR-MCNC: 125 MG/DL (ref 70–99)
GLUCOSE BLDC GLUCOMTR-MCNC: 133 MG/DL (ref 70–99)
GLUCOSE BLDC GLUCOMTR-MCNC: 151 MG/DL (ref 70–99)
GLUCOSE BLDC GLUCOMTR-MCNC: 98 MG/DL (ref 70–99)
GLUCOSE BLDC GLUCOMTR-MCNC: 98 MG/DL (ref 70–99)
GLUCOSE SERPL-MCNC: 116 MG/DL (ref 70–99)
GLUCOSE SERPL-MCNC: 120 MG/DL (ref 70–99)
HBV CORE IGM SERPL QL IA: NONREACTIVE
HBV SURFACE AG SERPL QL IA: NONREACTIVE
HCT VFR BLD AUTO: 24.2 % (ref 35–47)
HCV AB SERPL QL IA: NONREACTIVE
HGB BLD-MCNC: 7.6 G/DL (ref 11.7–15.7)
HGB BLD-MCNC: 8.5 G/DL (ref 11.7–15.7)
HIV 1+2 AB+HIV1 P24 AG SERPL QL IA: NONREACTIVE
LYMPHOCYTES # BLD AUTO: 0 10E9/L (ref 0.8–5.3)
LYMPHOCYTES NFR BLD AUTO: 0 %
MAGNESIUM SERPL-MCNC: 1.6 MG/DL (ref 1.6–2.3)
MCH RBC QN AUTO: 31.8 PG (ref 26.5–33)
MCHC RBC AUTO-ENTMCNC: 31.4 G/DL (ref 31.5–36.5)
MCV RBC AUTO: 101 FL (ref 78–100)
MONOCYTES # BLD AUTO: 0.2 10E9/L (ref 0–1.3)
MONOCYTES NFR BLD AUTO: 1.7 %
NEUTROPHILS # BLD AUTO: 10.7 10E9/L (ref 1.6–8.3)
NEUTROPHILS NFR BLD AUTO: 98.3 %
NT-PROBNP SERPL-MCNC: 4204 PG/ML (ref 0–450)
PHOSPHATE SERPL-MCNC: 6.4 MG/DL (ref 2.5–4.5)
PLATELET # BLD AUTO: 118 10E9/L (ref 150–450)
POTASSIUM SERPL-SCNC: 3.6 MMOL/L (ref 3.4–5.3)
POTASSIUM SERPL-SCNC: 4.4 MMOL/L (ref 3.4–5.3)
POTASSIUM SERPL-SCNC: 4.5 MMOL/L (ref 3.4–5.3)
POTASSIUM SERPL-SCNC: 4.6 MMOL/L (ref 3.4–5.3)
RBC # BLD AUTO: 2.39 10E12/L (ref 3.8–5.2)
RBC MORPH BLD: NORMAL
SODIUM SERPL-SCNC: 131 MMOL/L (ref 133–144)
SODIUM SERPL-SCNC: 132 MMOL/L (ref 133–144)
TRANSFUSION STATUS PATIENT QL: NORMAL
TRANSFUSION STATUS PATIENT QL: NORMAL
TROPONIN I SERPL-MCNC: 0.02 UG/L (ref 0–0.04)
WBC # BLD AUTO: 10.9 10E9/L (ref 4–11)

## 2019-08-04 PROCEDURE — 36592 COLLECT BLOOD FROM PICC: CPT | Performed by: STUDENT IN AN ORGANIZED HEALTH CARE EDUCATION/TRAINING PROGRAM

## 2019-08-04 PROCEDURE — 84484 ASSAY OF TROPONIN QUANT: CPT | Performed by: STUDENT IN AN ORGANIZED HEALTH CARE EDUCATION/TRAINING PROGRAM

## 2019-08-04 PROCEDURE — 85025 COMPLETE CBC W/AUTO DIFF WBC: CPT | Performed by: SURGERY

## 2019-08-04 PROCEDURE — 25000131 ZZH RX MED GY IP 250 OP 636 PS 637: Mod: GY | Performed by: SURGERY

## 2019-08-04 PROCEDURE — 80048 BASIC METABOLIC PNL TOTAL CA: CPT | Performed by: SURGERY

## 2019-08-04 PROCEDURE — 25000132 ZZH RX MED GY IP 250 OP 250 PS 637: Mod: GY | Performed by: SURGERY

## 2019-08-04 PROCEDURE — 36592 COLLECT BLOOD FROM PICC: CPT | Performed by: SURGERY

## 2019-08-04 PROCEDURE — 25000132 ZZH RX MED GY IP 250 OP 250 PS 637: Mod: GY | Performed by: STUDENT IN AN ORGANIZED HEALTH CARE EDUCATION/TRAINING PROGRAM

## 2019-08-04 PROCEDURE — 93005 ELECTROCARDIOGRAM TRACING: CPT

## 2019-08-04 PROCEDURE — 80048 BASIC METABOLIC PNL TOTAL CA: CPT | Performed by: STUDENT IN AN ORGANIZED HEALTH CARE EDUCATION/TRAINING PROGRAM

## 2019-08-04 PROCEDURE — 25000128 H RX IP 250 OP 636: Performed by: STUDENT IN AN ORGANIZED HEALTH CARE EDUCATION/TRAINING PROGRAM

## 2019-08-04 PROCEDURE — 12000026 ZZH R&B TRANSPLANT

## 2019-08-04 PROCEDURE — 84132 ASSAY OF SERUM POTASSIUM: CPT | Performed by: STUDENT IN AN ORGANIZED HEALTH CARE EDUCATION/TRAINING PROGRAM

## 2019-08-04 PROCEDURE — 25000125 ZZHC RX 250: Performed by: STUDENT IN AN ORGANIZED HEALTH CARE EDUCATION/TRAINING PROGRAM

## 2019-08-04 PROCEDURE — 84100 ASSAY OF PHOSPHORUS: CPT | Performed by: SURGERY

## 2019-08-04 PROCEDURE — 83735 ASSAY OF MAGNESIUM: CPT | Performed by: SURGERY

## 2019-08-04 PROCEDURE — 90937 HEMODIALYSIS REPEATED EVAL: CPT

## 2019-08-04 PROCEDURE — 83880 ASSAY OF NATRIURETIC PEPTIDE: CPT | Performed by: STUDENT IN AN ORGANIZED HEALTH CARE EDUCATION/TRAINING PROGRAM

## 2019-08-04 PROCEDURE — 25800030 ZZH RX IP 258 OP 636: Performed by: SURGERY

## 2019-08-04 PROCEDURE — 85018 HEMOGLOBIN: CPT | Performed by: TRANSPLANT SURGERY

## 2019-08-04 PROCEDURE — 93010 ELECTROCARDIOGRAM REPORT: CPT | Mod: 76 | Performed by: INTERNAL MEDICINE

## 2019-08-04 PROCEDURE — 84132 ASSAY OF SERUM POTASSIUM: CPT | Performed by: TRANSPLANT SURGERY

## 2019-08-04 PROCEDURE — 71045 X-RAY EXAM CHEST 1 VIEW: CPT

## 2019-08-04 PROCEDURE — 93970 EXTREMITY STUDY: CPT

## 2019-08-04 PROCEDURE — 25000132 ZZH RX MED GY IP 250 OP 250 PS 637: Mod: GY | Performed by: ANESTHESIOLOGY

## 2019-08-04 PROCEDURE — 36592 COLLECT BLOOD FROM PICC: CPT | Performed by: TRANSPLANT SURGERY

## 2019-08-04 RX ORDER — HYDROMORPHONE HCL/0.9% NACL/PF 0.2MG/0.2
0.2 SYRINGE (ML) INTRAVENOUS
Status: DISCONTINUED | OUTPATIENT
Start: 2019-08-04 | End: 2019-08-05

## 2019-08-04 RX ORDER — AMLODIPINE BESYLATE 10 MG/1
10 TABLET ORAL DAILY
Status: DISCONTINUED | OUTPATIENT
Start: 2019-08-04 | End: 2019-08-12 | Stop reason: HOSPADM

## 2019-08-04 RX ORDER — FUROSEMIDE 10 MG/ML
80 INJECTION INTRAMUSCULAR; INTRAVENOUS 3 TIMES DAILY
Status: DISCONTINUED | OUTPATIENT
Start: 2019-08-04 | End: 2019-08-07

## 2019-08-04 RX ORDER — DIPHENHYDRAMINE HCL 12.5MG/5ML
25-50 LIQUID (ML) ORAL ONCE
Status: DISCONTINUED | OUTPATIENT
Start: 2019-08-04 | End: 2019-08-05 | Stop reason: CLARIF

## 2019-08-04 RX ORDER — SIMETHICONE 80 MG
80 TABLET,CHEWABLE ORAL EVERY 6 HOURS PRN
Status: DISCONTINUED | OUTPATIENT
Start: 2019-08-04 | End: 2019-08-12 | Stop reason: HOSPADM

## 2019-08-04 RX ORDER — ACETAMINOPHEN 325 MG/1
650 TABLET ORAL ONCE
Status: COMPLETED | OUTPATIENT
Start: 2019-08-04 | End: 2019-08-05

## 2019-08-04 RX ORDER — FUROSEMIDE 40 MG
80 TABLET ORAL DAILY
Status: DISCONTINUED | OUTPATIENT
Start: 2019-08-04 | End: 2019-08-05

## 2019-08-04 RX ORDER — DIPHENHYDRAMINE HCL 25 MG
25-50 CAPSULE ORAL ONCE
Status: DISCONTINUED | OUTPATIENT
Start: 2019-08-04 | End: 2019-08-05 | Stop reason: CLARIF

## 2019-08-04 RX ORDER — CALCIUM CARBONATE 750 MG/1
750 TABLET, CHEWABLE ORAL 2 TIMES DAILY PRN
Status: ON HOLD | COMMUNITY
End: 2019-08-12

## 2019-08-04 RX ORDER — SEVELAMER CARBONATE 800 MG/1
TABLET, FILM COATED ORAL
COMMUNITY
End: 2019-08-27

## 2019-08-04 RX ADMIN — ATORVASTATIN CALCIUM 10 MG: 10 TABLET, FILM COATED ORAL at 09:05

## 2019-08-04 RX ADMIN — PROCHLORPERAZINE EDISYLATE 5 MG: 5 INJECTION INTRAMUSCULAR; INTRAVENOUS at 14:36

## 2019-08-04 RX ADMIN — Medication: at 21:43

## 2019-08-04 RX ADMIN — ONDANSETRON 4 MG: 2 INJECTION INTRAMUSCULAR; INTRAVENOUS at 08:14

## 2019-08-04 RX ADMIN — ACETAMINOPHEN 650 MG: 325 TABLET, FILM COATED ORAL at 11:08

## 2019-08-04 RX ADMIN — ONDANSETRON 4 MG: 2 INJECTION INTRAMUSCULAR; INTRAVENOUS at 20:33

## 2019-08-04 RX ADMIN — SIMETHICONE CHEW TAB 80 MG 80 MG: 80 TABLET ORAL at 21:03

## 2019-08-04 RX ADMIN — DEXTROSE AND SODIUM CHLORIDE: 5; 900 INJECTION, SOLUTION INTRAVENOUS at 17:00

## 2019-08-04 RX ADMIN — SODIUM CHLORIDE 250 ML: 9 INJECTION, SOLUTION INTRAVENOUS at 21:43

## 2019-08-04 RX ADMIN — SODIUM CHLORIDE 300 ML: 9 INJECTION, SOLUTION INTRAVENOUS at 21:43

## 2019-08-04 RX ADMIN — OXYCODONE HYDROCHLORIDE 10 MG: 5 TABLET ORAL at 02:35

## 2019-08-04 RX ADMIN — ONDANSETRON 4 MG: 2 INJECTION INTRAMUSCULAR; INTRAVENOUS at 14:01

## 2019-08-04 RX ADMIN — DAPSONE 100 MG: 100 TABLET ORAL at 09:05

## 2019-08-04 RX ADMIN — AMLODIPINE BESYLATE 10 MG: 10 TABLET ORAL at 11:08

## 2019-08-04 RX ADMIN — ONDANSETRON 4 MG: 2 INJECTION INTRAMUSCULAR; INTRAVENOUS at 02:20

## 2019-08-04 RX ADMIN — TACROLIMUS 1.5 MG: 1 CAPSULE ORAL at 09:05

## 2019-08-04 RX ADMIN — MYCOPHENOLATE MOFETIL 750 MG: 250 CAPSULE ORAL at 09:04

## 2019-08-04 RX ADMIN — FUROSEMIDE 80 MG: 10 INJECTION, SOLUTION INTRAMUSCULAR; INTRAVENOUS at 14:00

## 2019-08-04 RX ADMIN — Medication 2.5 MG: at 14:01

## 2019-08-04 RX ADMIN — MYCOPHENOLATE MOFETIL 750 MG: 250 CAPSULE ORAL at 17:55

## 2019-08-04 RX ADMIN — TACROLIMUS 1.5 MG: 1 CAPSULE ORAL at 17:56

## 2019-08-04 RX ADMIN — OXYCODONE HYDROCHLORIDE 5 MG: 5 TABLET ORAL at 07:28

## 2019-08-04 RX ADMIN — FUROSEMIDE 80 MG: 80 TABLET ORAL at 11:07

## 2019-08-04 RX ADMIN — DEXTROSE AND SODIUM CHLORIDE: 5; 900 INJECTION, SOLUTION INTRAVENOUS at 02:15

## 2019-08-04 RX ADMIN — Medication 1 CAPSULE: at 11:36

## 2019-08-04 RX ADMIN — TOPICAL ANESTHETIC 0.5 ML: 200 SPRAY DENTAL; PERIODONTAL at 12:10

## 2019-08-04 RX ADMIN — Medication 2.5 MG: at 21:03

## 2019-08-04 RX ADMIN — ACETAMINOPHEN 650 MG: 325 TABLET, FILM COATED ORAL at 06:07

## 2019-08-04 RX ADMIN — Medication 0.2 MG: at 20:21

## 2019-08-04 RX ADMIN — ACETAMINOPHEN 650 MG: 325 TABLET, FILM COATED ORAL at 02:25

## 2019-08-04 ASSESSMENT — ACTIVITIES OF DAILY LIVING (ADL)
COGNITION: 0 - NO COGNITION ISSUES REPORTED
RETIRED_EATING: 0-->INDEPENDENT
ADLS_ACUITY_SCORE: 12
RETIRED_COMMUNICATION: 0-->UNDERSTANDS/COMMUNICATES WITHOUT DIFFICULTY
ADLS_ACUITY_SCORE: 13
ADLS_ACUITY_SCORE: 13
BATHING: 0-->INDEPENDENT
PRIOR_FUNCTIONAL_LEVEL_COMMENT: IND
TRANSFERRING: 0-->INDEPENDENT
ADLS_ACUITY_SCORE: 13
SWALLOWING: 0-->SWALLOWS FOODS/LIQUIDS WITHOUT DIFFICULTY
TOILETING: 0-->INDEPENDENT
AMBULATION: 0-->INDEPENDENT
ADLS_ACUITY_SCORE: 13
DRESS: 0-->INDEPENDENT
FALL_HISTORY_WITHIN_LAST_SIX_MONTHS: NO
ADLS_ACUITY_SCORE: 11

## 2019-08-04 ASSESSMENT — MIFFLIN-ST. JEOR: SCORE: 1257.07

## 2019-08-04 ASSESSMENT — PAIN DESCRIPTION - DESCRIPTORS: DESCRIPTORS: ACHING;SORE

## 2019-08-04 NOTE — PROGRESS NOTES
HEMODIALYSIS TREATMENT NOTE    Date: 8/3/2019  Time: 9:26 PM    Data:  Pre Wt:     Desired Wt: 54 kg   Post Wt:  54  Weight change: 0  kg  Ultrafiltration - Post Run Net Total Removed (mL):0  Vascular Access Status: patent  Dialyzer Rinse: streaked moderate   Total Blood Volume Processed: 46.7    Total Dialysis (Treatment) Time: 2hrs        Lab:   Yes, hep B lab drawn and sent     Interventions:  Pt is button hole access and prefers to cannulate herself. Pt's home emla cream applied while waiting for RO.    Assessment:  Pt having Hd run for K o f6.4. Pt reports feeling unless, stiff, nauseated with high BP at start of treatment. Nephrology contact about BP they defered to txp surgeon. txp surgeon does not want to treat BP at this time. Will continue to monitor.      Plan:    Will continue to monitor and follow POC per renal team.

## 2019-08-04 NOTE — PROGRESS NOTES
CLINICAL NUTRITION SERVICES - ASSESSMENT NOTE     Nutrition Prescription    RECOMMENDATIONS FOR MDs/PROVIDERS TO ORDER:  None at this time.     Malnutrition Status:    Patient does not meet two of the above criteria necessary for diagnosing malnutrition    Recommendations already ordered by Registered Dietitian (RD):  Discharge diet order written.     Future/Additional Recommendations:  If suspect eating poorly, would offer supplements and start on kcal cnts.     Minimize diet restrictions as able d/t high calorie/protein needs post-transplant.  Oral supplements as needed to help meet nutritional needs.     High protein food choices with meals to help meet high needs post-transplant over the next 6-8 weeks.     Heart-healthy diet (low saturated fat, low sodium, high fiber) and food safety precautions long term due to immunosuppression regimen post-transplant         REASON FOR ASSESSMENT  Mona Wagner is a 28 year old female seen by the dietitian for MD Order- Assess & Educate post-op SOT.     Chart Review:   PMH significant for HTN, acquired hypothyroidism, and anemia in CKD. 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 Previously on HD x5 years, last HD on 8/3.     NUTRITION HISTORY  Pt known to Clinical Nutrition PTA. Assessed by RD on 13 for renal transplant listing. Met with pt today who reports her appetite and PO intake was at baseline PTA. Pt typically consumes 3 meals daily; pt reports example of a typical meal is stir strickland (pork, rice, vegetables).     CURRENT NUTRITION ORDERS  Diet:   Clear Liquid (plan to advance to regular today or tomorrow as tolerated)    Intake/Tolerance:   Per flowsheets, no documented intake yet this admission. Per review of [facility], pt has not ordered anything from kitchen yet. Pt endorses nausea as current barrier to PO intake, but she does plan to try some clear liquids soon.     LABS  Labs reviewed  Na+ 132 (L)  K+ 4.5 (WNL)  Mg++  "1.6 (WNL) - received 1 g magnesium sulfate on 8/3  Phos 6.4 (H)  Glucose 109  Hemoglobin A1c 4.8 - 8/3/19  BUN 24 (WNL - trending down), Cr 5.90 (H - trending down)    MEDICATIONS  Medications reviewed  Mycophenolate  Nephrocaps (renal MVI)  Tacrolimus  D5 NS 75 mL/hr provides 90 g dex/day (306 kcal/day) in 1800 mL/day  Zofran PRN - last received @ 0814 today    ANTHROPOMETRICS  Height: 152.4 cm (5' 0\")  Most Recent Weight: 54 kg (119 lb 0.8 oz)    IBW: 45 kg (120% IBW)  BMI: Normal BMI  Weight History:   Wt Readings from Last 10 Encounters:   08/03/19 54 kg (119 lb 0.8 oz)   07/11/19 53.7 kg (118 lb 6.4 oz)   07/09/19 54.2 kg (119 lb 8 oz)   05/14/19 53.1 kg (117 lb)   03/07/19 51 kg (112 lb 8 oz)   03/05/19 52.3 kg (115 lb 6.4 oz)   01/29/19 53.1 kg (117 lb)   01/18/19 55.4 kg (122 lb 3.2 oz)   01/15/19 56.5 kg (124 lb 9.6 oz)   01/06/19 56.1 kg (123 lb 11.2 oz)   Pt's weight appears to have fluctuated over the past 7 months, likely related to shifts in fluid status as pt has been on HD. Per hemodialysis nurse note on 8/3, pt's dry weight is 54 kg. Pr reports  lbs and denies any unintentional weight loss, attributes weight fluctuations to fluid status.     Dosing Weight: 47 kg (adjusted) - based on dry weight 54 kg and IBW of 45 kg     ASSESSED NUTRITION NEEDS:  Estimated Energy Needs: 1,410 - 1,645 kcals (30-35 Kcal/Kg)  Justification: increased needs post-op SOT  Estimated Protein Needs: 61 - 94 grams protein (1.3-2 gm/Kg)  Justification: increased needs post op SOT  Estimated Fluid Needs: 1,175 - 1,410  mL (25-30 mL/Kg)  Justification: maintenance, or per MD pending fluid status and adequate UOP    PHYSICAL FINDINGS  See malnutrition section below.     MALNUTRITION  % Intake: Decreased intake does not meet criteria  % Weight Loss: None noted  Subcutaneous Fat Loss: None observed  Muscle Loss: None observed  Fluid Accumulation/Edema: None noted  Malnutrition Diagnosis: Patient does not meet two of the " above criteria necessary for diagnosing malnutrition    NUTRITION DIAGNOSIS:  Food and nutrition-related knowledge deficit r/t length of time since previous post-transplant education AEB patient verbal report, review of chart record, and MD consult for nutrition education.     INTERVENTIONS  Implementation  Nutrition Education:  1. Provided instruction on post-transplant diet with discussion regarding protein sources and high protein needs in acute post-tx phase.  Reviewed recommendations to follow low fat/low sodium diet long term and discussed heart healthy diet tips.  Discussed monitoring of K+/Phos lab values with possible need for adjustment of these in the diet as necessary. Reviewed need for food safety precautions to prevent food borne illness.    2. Provided & reviewed handout: Post-transplant diet guidelines. Patient receptive to information provided. Expected diet compliance is good.     Goals  1. Patient will verbalize understanding of 3 important aspects of post-transplant diet guidelines.   2. PO intake >50% meals TID.    Monitoring/Evaluation  Progress toward goals will be monitored and evaluated per protocol.    Jaspal Grider, MS, RD, LD  Weekend pager [phone]

## 2019-08-04 NOTE — PHARMACY-ADMISSION MEDICATION HISTORY
Admission Medication History status for the 2019 admission is complete.  See EPIC admission navigator for Prior to Admission medications.  Patient's Name: Mona Wagner  Patient's : 1990   28 year old, female    Medication History Sources: Patient   Primary Pharmacy:    [id number] - [place], [place] - [address] [id number] - [place], [place] - [address]    Medication history source reliability: Good - patient knew her medications very well and able to recite regimen  Medication adherence:  Good    Changes made to PTA medication list (reason)  Added: Tums PRN  Deleted: Amlodipine 10 mg tablet - not taken at home       Dialyvite - no longer taking       Doxazosin - no longer taking       Hydroxyzine - no longer taking       Levetiracetam - stopped by neuro provider in  - has been tapered off, no seizures in years       Sennosides - no longer needed  Changed: Sevelamer 800 mg tablet - Take 1600 mg (not 2400 mg) by mouth three times daily with meals and take 800 mg with snacks    High Risk Medications (Warfarin, Immunosuppressants, Insulin, Antibiotics, or Other)    No    Continuous Patch/Pump Device    No    Notable Drug-Drug Interactions:     There were no significant drug interactions identified upon review of medication list.    Additional medication history information (including actions taken by pharmacist): None    Time spent in this activity: 60 minutes    Medication history completed by:  Angeles Mueller, 4th Year Student Pharmacist    Prior to Admission medications    Medication Sig Last Dose Taking? Auth Provider   acetaminophen (TYLENOL) 325 MG tablet Take 2 tablets (650 mg) by mouth every 4 hours as needed for mild pain 2019 at 1700 Yes Macarena Bowen MD   calcium carbonate 750 MG CHEW Take 750 mg by mouth 2 times daily as needed 2019 at PM Yes Unknown, Entered By History   cinacalcet (SENSIPAR) 30 MG tablet Take 1 tablet  (30 mg) by mouth daily (with dinner) 8/1/2019 at 1800 Yes Macarena Bowen MD   EMLA cream APPLY TO ACCESS SITE 30 TO 60 MINUTES PRIOR TO        DIALYSIS 8/2/2019 at 1300 Yes Delicia Parra MD   Lactobacillus Rhamnosus, GG, ([facility]) capsule Take 1 capsule by mouth 2 times daily 8/2/2019 at AM Yes Macarena Bowen MD   levothyroxine (SYNTHROID/LEVOTHROID) 25 MCG tablet Take 1 tablet (25 mcg) Tuesday, Thursday, Saturday and Sunday and 1.5 tablets (37.5 mcg) on Monday, Wednesday and Friday every week. 8/1/2019 at Unknown time Yes GEORGIA Coles MD   multivitamin RENAL (NEPHROCAPS/TRIPHROCAPS) 1 MG capsule Take 1 capsule by mouth daily 8/1/2019 at Unknown time Yes Delicia Parra MD   norethindrone (MICRONOR) 0.35 MG tablet Take 1 tablet (0.35 mg) by mouth daily 8/2/2019 at 2115 Yes Macarena Bowen MD   polyethylene glycol (MIRALAX/GLYCOLAX) powder Take 17 g (1 capful) by mouth daily Past Week at Unknown time Yes Delicia Parra MD   sevelamer (RENVELA) 800 MG tablet Take 1600 mg by mouth three times daily with meals AND Take 800 mg by mouth with snacks. 8/2/2019 at 1300 Yes Unknown, Entered By History   simethicone (MYLICON) 125 MG chewable tablet Take 1 tablet (125 mg) by mouth 4 times daily as needed for intestinal gas 8/2/2019 at PM Yes Macarena Bowen MD   diphenhydrAMINE (BENADRYL) 25 MG tablet Take 1-2 tablets (25-50 mg) by mouth every 6 hours as needed for itching or allergies More than a month at Unknown time  Joseluis Freire MD   docusate sodium (COLACE) 100 MG tablet Take 1 tablet (100 mg) by mouth daily as needed for constipation More than a month at Unknown time  Macarena Bowen MD   EPINEPHrine (EPIPEN/ADRENACLICK/OR ANY BX GENERIC EQUIV) 0.3 MG/0.3ML injection 2-pack Inject 0.3 mLs (0.3 mg) into the muscle as needed for anaphylaxis  Patient not taking: Reported on 5/14/2019 More than a month at Unknown time   Joseluis Freire MD   HYDROcodone-acetaminophen (NORCO) 5-325 MG tablet Take 1 tablet by mouth at the end of dialysis run as needed for headaches More than a month at Unknown time  Macarena Bowen MD   ondansetron (ZOFRAN ODT) 4 MG ODT tab Take 1-2 tablets (4-8 mg) by mouth every 8 hours as needed for nausea More than a month at Unknown time  Macarena Bowen MD

## 2019-08-04 NOTE — CONSULTS
[facility], [place]  Transplant Nephrology Consult  Date of Admission:  2019  Today's Date: 2019  Requesting physician: Dorian Johnson MD    Assessment & Plan   # DDKT: baseline Cr ~ TBD; DCD anticipate DGF   - Proteinuria: Not checked post transplant   - Date DSA Last Checked:Pending     # Immunosuppression: usual standard induction    Tacrolimus immediate release (goal 8-10) and Mycophenolate mofetil (goal 1-3.5)   - Changes: No    # Prophylaxis:   - PJP: Dapsone   - CMV: Valcyte   - Thrush: None    # Hypertension: Controlled; Goal BP: < 140/90   - Volume status: Mildly hypervolemic   - Changes: Yes - Add Norvasc 10 mg daily    # Anemia in Chronic Renal Disease: Hgb: Stable      CHARLINE: No   - Iron studies: Not checked recently    # Mineral Bone Disorder:   - Secondary renal hyperparathyroidism; PTH level: Not checked recently  - Vitamin D; level: Not checked recently      # Electrolytes:   - Potassium; level: Hyperkalemia with anuria (will dialyze)   - Magnesium; level: Normal    # Transplant History:  Etiology of kidney failure: IgA nephropathy  Tx: DDKT  Transplant: 8/3/2019 (Kidney)  Donor Type:  - Cardiac Death Donor Class: Standard Criteria Donor  Crossmatch at time of Tx: negative  Significant changes in immunosuppression: None  Significant transplant-related complications: None    Recommendations were communicated to the primary team via this note.    Dean Wilson MD  Pager: [phone]    REASON FOR CONSULT   Post transplant management of ESRD     History of Present Illness   Mona Wagner is a 28 year old female with ESRD secondary to IgA. She was found to be a suitable candidate and was listed active. An offer became available and she underwent DDKT (DCD) short warm ischemia time 3 vessels on a common patch.   Post engraftment ultrasound showed patent vasculature. She was recovering nicely in the PACU. No UOP yet. We discussed checking her potassium  regularly. Report incisional tenderness that is controlled.   She takes her medications regularly. She last dialyzed Friday via her arm fistula that is in good working condition.     Review of Systems    The 10 point Review of Systems is negative other than noted in the HPI or here.     Past Medical History    I have reviewed this patient's medical history and updated it with pertinent information if needed.   Past Medical History:   Diagnosis Date     Anemia in chronic kidney disease      Dialysis patient (H)      End stage renal disease on dialysis (H)      Hypertension      Hypothyroid      Pituitary adenoma (H)        Past Surgical History   I have reviewed this patient's surgical history and updated it with pertinent information if needed.  Past Surgical History:   Procedure Laterality Date     CREATE FISTULA ARTERIOVENOUS UPPER EXTREMITY  1/2/2014    Procedure: CREATE FISTULA ARTERIOVENOUS UPPER EXTREMITY;  Left Wrist Arteriovenous Fistula Placement;  Surgeon: Shashi Castro MD;  Location:  OR     [facility]/DIALYSIS CATHETER  12/10/2013            Social History   I have reviewed this patient's social history and updated it with pertinent information if needed. Mona Wagner  reports that she has never smoked. She has never used smokeless tobacco. She reports that she does not drink alcohol or use drugs.    Family History   I have reviewed this patient's family history and updated it with pertinent information if needed.   Family History   Problem Relation Age of Onset     Hypertension Sister      Diabetes Sister      Cerebrovascular Disease Sister      Kidney Disease Mother      Kidney Disease Sister      Cerebrovascular Disease Father      Coronary Artery Disease No family hx of      Breast Cancer No family hx of      Cancer - colorectal No family hx of      Ovarian Cancer No family hx of      Prostate Cancer No family hx of      Other Cancer No family hx of      Asthma No family hx of        Prior to  Admission Medications   Prior to Admission Medications   Prescriptions Last Dose Informant Patient Reported? Taking?   B Complex-C-Zn-Folic Acid (DIALYVITE 800-ZINC 15) 0.8 MG TABS 8/1/2019 at Unknown time  No Yes   Sig: Take 1 tablet by mouth daily   EMLA cream 8/2/2019 at 1300  No Yes   Sig: APPLY TO ACCESS SITE 30 TO 60 MINUTES PRIOR TO        DIALYSIS   EPINEPHrine (EPIPEN/ADRENACLICK/OR ANY BX GENERIC EQUIV) 0.3 MG/0.3ML injection 2-pack More than a month at Unknown time  No No   Sig: Inject 0.3 mLs (0.3 mg) into the muscle as needed for anaphylaxis   Patient not taking: Reported on 5/14/2019   HYDROcodone-acetaminophen (NORCO) 5-325 MG tablet More than a month at Unknown time  Yes No   Sig: Take 1 tablet by mouth at the end of dialysis run as needed for headaches   Lactobacillus Rhamnosus, GG, ([facility]) capsule 8/2/2019 at Unknown time  No Yes   Sig: Take 1 capsule by mouth 2 times daily   RENVELA 800 MG tablet 8/2/2019 at 1300  No Yes   Sig: Take 3 tablets (2,400 mg) by mouth 3 times daily (with meals) 1 with snacks   acetaminophen (TYLENOL) 325 MG tablet 8/2/2019 at 1700  No Yes   Sig: Take 2 tablets (650 mg) by mouth every 4 hours as needed for mild pain   amLODIPine (NORVASC) 10 MG tablet Unknown at Unknown time  No No   Sig: Take 1 tablet (10 mg) by mouth daily   Patient not taking: Reported on 5/14/2019   cinacalcet (SENSIPAR) 30 MG tablet 8/1/2019 at Unknown time  Yes Yes   Sig: Take 1 tablet (30 mg) by mouth daily (with dinner)   diphenhydrAMINE (BENADRYL) 25 MG tablet More than a month at Unknown time  No No   Sig: Take 1-2 tablets (25-50 mg) by mouth every 6 hours as needed for itching or allergies   docusate sodium (COLACE) 100 MG tablet More than a month at Unknown time  Yes No   Sig: Take 1 tablet (100 mg) by mouth daily as needed for constipation   doxazosin (CARDURA) 1 MG tablet Unknown at Unknown time  No No   Sig: Take 1 tablet (1 mg) by mouth At Bedtime   Patient not  taking: Reported on 2019   hydrOXYzine (ATARAX) 50 MG tablet Unknown at Unknown time  Yes No   levETIRAcetam (KEPPRA) 250 MG tablet Unknown at Unknown time  No No   Sig: TAKE 1 AND 1/2 TABLETS DAILY. TAKE AN ADDITIONAL 250MG AFTER DIALYSIS ON MONDAY,  AND FRIDAY   levothyroxine (SYNTHROID/LEVOTHROID) 25 MCG tablet 2019 at Unknown time  No Yes   Sig: Take 1 tablet (25 mcg) Tuesday, Thursday, Saturday and  and 1.5 tablets (37.5 mcg) on Monday, Wednesday and Friday every week.   multivitamin RENAL (NEPHROCAPS/TRIPHROCAPS) 1 MG capsule 2019 at Unknown time  No Yes   Sig: Take 1 capsule by mouth daily   norethindrone (MICRONOR) 0.35 MG tablet 2019 at Unknown time  No Yes   Sig: Take 1 tablet (0.35 mg) by mouth daily   ondansetron (ZOFRAN ODT) 4 MG ODT tab More than a month at Unknown time  No No   Sig: Take 1-2 tablets (4-8 mg) by mouth every 8 hours as needed for nausea   polyethylene glycol (MIRALAX/GLYCOLAX) powder Past Week at Unknown time  No Yes   Sig: Take 17 g (1 capful) by mouth daily   sennosides (SENOKOT) 8.6 MG tablet Unknown at Unknown time  No No   Sig: Take 1 tablet by mouth 2 times daily as needed for constipation   sevelamer (RENVELA) 800 MG tablet   Yes No   Sig: Take 1 tablet (800 mg) by mouth 3 times daily (with meals) and snacks   simethicone (MYLICON) 125 MG chewable tablet 2019 at Unknown time  Yes Yes   Sig: Take 1 tablet (125 mg) by mouth 4 times daily as needed for intestinal gas      Facility-Administered Medications: None     Allergies   Allergies   Allergen Reactions     Chlorhexidine Rash     Rash at site     Sulfa Drugs Rash     Muscle stiffness of neck     Lisinopril Swelling     angioedema     Alcohol Swabs [Isopropyl Alcohol] Rash       Physical Exam   Temp  Av.4  F (36.9  C)  Min: 97.8  F (36.6  C)  Max: 98.9  F (37.2  C)      Pulse  Av  Min: 67  Max: 129 Resp  Avg: 15.9  Min: 10  Max: 22  SpO2  Av.2 %  Min: 91 %  Max: 100 %    CVP  (mmHg): 10 mmHgBP (!) 148/107   Pulse 85   Temp 98.4  F (36.9  C) (Oral)   Resp 18   Ht 1.524 m (5')   Wt 54 kg (119 lb 0.8 oz)   SpO2 100%   BMI 23.25 kg/m     Date 08/03/19 0700 - 08/04/19 0659   Shift 4459-0008 6268-0808 3346-2544 24 Hour Total   INTAKE   P.O. 0   0   I.V. 2058.75 487.5  2546.25   Shift Total(mL/kg) 2058.75(38.13) 487.5(9.03)  2546.25(47.15)   OUTPUT   Urine 11 10  21   Drains 35 14  49   Blood 200   200   Shift Total(mL/kg) 246(4.56) 24(0.44)  270(5)   Weight (kg) 54 54 54 54      Admit Weight: 54 kg (119 lb 0.8 oz)     GENERAL APPEARANCE: alert and no distress  HENT: mouth without ulcers or lesions  LYMPHATICS: no cervical or supraclavicular nodes  RESP: lungs clear to auscultation - no rales, rhonchi or wheezes  CV: regular rhythm, normal rate, no rub, no murmur  EDEMA: no LE edema bilaterally  ABDOMEN: soft, nondistended, nontender, bowel sounds normal  MS: extremities normal - no gross deformities noted, no evidence of inflammation in joints, no muscle tenderness  SKIN: no rash    Data   CMP  Recent Labs   Lab 08/03/19  1853 08/03/19  1240 08/03/19  1047 08/02/19  1938   NA  --  132* 131* 134   POTASSIUM 6.4* 5.0 4.1 3.6   CHLORIDE  --  100  --  91*   CO2  --  24  --  35*   ANIONGAP  --  7  --  8   GLC  --  125* 111* 82   BUN  --  34*  --  22   CR  --  7.71*  --  6.11*   GFRESTIMATED  --  7*  --  9*   GFRESTBLACK  --  8*  --  10*   SHAMIR  --  8.3*  --  9.8   MAG  --  1.3*  --   --    PHOS  --  4.7*  --   --    PROTTOTAL  --   --   --  8.6   ALBUMIN  --   --   --  4.2   BILITOTAL  --   --   --  0.8   ALKPHOS  --   --   --  65   AST  --   --   --  4   ALT  --   --   --  13     CBC  Recent Labs   Lab 08/03/19  1853 08/03/19  1240 08/03/19  1047 08/02/19  1938   HGB 9.1* 8.5* 8.9* 11.7   WBC  --  9.9  --  9.1   RBC  --  2.64*  --  3.71*   HCT  --  26.5*  --  37.0   MCV  --  100  --  100   MCH  --  32.2  --  31.5   MCHC  --  32.1  --  31.6   RDW  --  13.1  --  12.9   PLT  --  113*  --  192      INR  Recent Labs   Lab 08/02/19  1938   INR 0.94   PTT 33     ABG  Recent Labs   Lab 08/03/19  1047   O2PER 40      Urine Studies  Recent Labs   Lab Test 12/10/13  2050 05/15/13  1951   COLOR Straw Dark Yellow   APPEARANCE Clear Slightly Cloudy   URINEGLC 70* Negative   URINEBILI Negative Negative   URINEKETONE Negative Negative   SG 1.008 1.011   UBLD Small* Moderate*   URINEPH 6.5 6.0   PROTEIN 100* 300*   NITRITE Negative Negative   LEUKEST Negative Large*   RBCU 2 19*   WBCU 9* 151*     Recent Labs   Lab Test 12/11/13  1915   UTPG 7.42*     PTH  Recent Labs   Lab Test 12/11/13  0601   PTHI 1,131*     Iron Studies  Recent Labs   Lab Test 12/29/16  1338 12/11/13  0600   IRON 137 65   * 245   IRONSAT 60* 26   MONET 1,039*  --        IMAGING:  All imaging studies reviewed by me.    -------  Potassium came back at 6.4, patient was seen by the fellow on call and dialysis was arranged.

## 2019-08-04 NOTE — PHARMACY-PHARMACOTHERAPY NOTE
Patient is being treated for hyperkalemia. A pharmacy consult was initiated to review the patient's medication list for possible causes of hyperkalemia.    No medications on this patient's profile are likely to have the side effect of hyperkalemia.     Continue current medication regimen.     Magalys Douglas, PharmD, pager [phone]

## 2019-08-04 NOTE — PHARMACY-PHARMACOTHERAPY NOTE
Adult Kidney Transplant Post Operative Note    28 year old female s/p  donor kidney transplant on 8/3 for ESRD.    Planned immunosuppression regimen per kidney transplant protocol: Please see bleow:    INDUCTION: thymoglobulin to total ~ 6mg/kg and methylprednisolone IV daily x 3 doses used as pre-med for thymoglobulin.  MAINTENANCE:  mycophenolate and tacrolimus with goal trough levels of 8-10 mcg/L for first 6 months post-transplant.    Opportunistic pathogen prophylaxis includes: valganciclovir.    Patient is not enrolled in medication study.    Pharmacy will monitor for medication interactions and immunosuppression levels in conjunction with the team. Medication therapy needs for discharge planning will continue to be addressed throughout the current admission via multidisciplinary rounds and order review.  Pharmacy will make recommendations as appropriate.    Thymoglobulin 100mg intra-op (not documented)/100mg  Solu-Medrol 500mg intra-op (also not documented)/250mg

## 2019-08-04 NOTE — PROGRESS NOTES
12:50 AM  2019  Patient removed from [facility] waitlist after  donor kidney transplant. [facility] ID [id number].   Donor PHS increased risk: No.   If Yes, MD documentation NA  Daisy Zamudio RN, BA  On Call Organ Coordinator  .

## 2019-08-04 NOTE — PROGRESS NOTES
[facility], [place]   Transplant Nephrology Progress Note  Date of Admission:  2019  Today's Date: 2019     Assessment & Plan    # DDKT: baseline Cr ~ TBD; DCD anticipate DGF              - Proteinuria: Not checked post transplant              - Date DSA Last Checked:Pending      # Immunosuppression: usual standard induction    Tacrolimus immediate release (goal 8-10) and Mycophenolate mofetil (goal 1-3.5)              - Changes: No     # Prophylaxis:              - PJP: Dapsone              - CMV: Valcyte              - Thrush: None     # Hypertension: Controlled; Goal BP: < 140/90              - Volume status: Mildly hypervolemic              - Changes: Yes - Add Norvasc 10 mg daily     # Anemia in Chronic Renal Disease: Hgb: Stable      CHARLINE: No              - Iron studies: Not checked recently     # Mineral Bone Disorder:   - Secondary renal hyperparathyroidism; PTH level: Not checked recently  - Vitamin D; level: Not checked recently        # Electrolytes:   - Potassium; level: Hyperkalemia with anuria (will dialyze)   - Magnesium; level: Normal     # Transplant History:  Etiology of kidney failure: IgA nephropathy  Tx: DDKT  Transplant: 8/3/2019 (Kidney)  Donor Type:  - Cardiac Death        Donor Class: Standard Criteria Donor  Crossmatch at time of Tx: negative  Significant changes in immunosuppression: None  Significant transplant-related complications: None     Recommendations were communicated to the primary team via this note  Dean Wilson MD  Pager: [phone]    Interval History     Overnight required dialysis with much improved potassium. She feels tight today and was worried about the potassium although it returned at a normal value. Her legs were slightly tight but without pitting edema or calf tenderness. She was having hard time getting in and out of bed. Her incision was slight ink and was marked. Feels tight, no appetite yet     Review of Systems    4 point ROS was obtained and negative except as noted in the Interval History.    Physical Exam   Temp  Av.3  F (36.8  C)  Min: 97.6  F (36.4  C)  Max: 98.9  F (37.2  C)      Pulse  Av.1  Min: 67  Max: 129 Resp  Av.1  Min: 10  Max: 22  SpO2  Av.7 %  Min: 91 %  Max: 100 %    CVP (mmHg): 7 mmHgBP (!) 138/90 (BP Location: Right arm, Cuff Size: Adult Regular)   Pulse 90   Temp 98.6  F (37  C) (Oral)   Resp 16   Ht 1.524 m (5')   Wt 54 kg (119 lb 0.8 oz)   SpO2 100%   BMI 23.25 kg/m     Date 19 07 - 19 0659   Shift 6019-4391 5786-5131 1207-3559 24 Hour Total   INTAKE   I.V. 30   30   Shift Total(mL/kg) 30(0.56)   30(0.56)   OUTPUT   Shift Total(mL/kg)       Weight (kg) 54 54 54 54      Admit Weight: 54 kg (119 lb 0.8 oz)   GENERAL APPEARANCE: alert and no distress  HENT: mouth without ulcers or lesions  LYMPHATICS: no cervical or supraclavicular nodes  RESP: lungs clear to auscultation - no rales, rhonchi or wheezes  CV: regular rhythm, normal rate, no rub, no murmur  EDEMA: no LE edema bilaterally  ABDOMEN: soft, nondistended, nontender, bowel sounds normal  MS: extremities normal - no gross deformities noted, no evidence of inflammation in joints, no muscle tenderness  SKIN: no rash    Data   All labs reviewed by me.  CMP  Recent Labs   Lab 19  0748 19  0429 19  2348 19  1853 19  1240 19  1047 19  1938   NA  --  132*  --   --  132* 131* 134   POTASSIUM 4.5 4.4 3.6 6.4* 5.0 4.1 3.6   CHLORIDE  --  99  --   --  100  --  91*   CO2  --  24  --   --  24  --  35*   ANIONGAP  --  9  --   --  7  --  8   GLC  --  116*  --   --  125* 111* 82   BUN  --  24  --   --  34*  --  22   CR  --  5.90*  --   --  7.71*  --  6.11*   GFRESTIMATED  --  9*  --   --  7*  --  9*   GFRESTBLACK  --  10*  --   --  8*  --  10*   SHAMIR  --  9.1  --   --  8.3*  --  9.8   MAG  --  1.6  --   --  1.3*  --   --    PHOS  --  6.4*  --   --  4.7*  --   --    PROTTOTAL  --   --    --   --   --   --  8.6   ALBUMIN  --   --   --   --   --   --  4.2   BILITOTAL  --   --   --   --   --   --  0.8   ALKPHOS  --   --   --   --   --   --  65   AST  --   --   --   --   --   --  4   ALT  --   --   --   --   --   --  13     CBC  Recent Labs   Lab 08/04/19  0748 08/04/19  0429 08/03/19  2348 08/03/19  1853 08/03/19  1240  08/02/19  1938   HGB 7.6* 7.6* 8.5* 9.1* 8.5*   < > 11.7   WBC  --  10.9  --   --  9.9  --  9.1   RBC  --  2.39*  --   --  2.64*  --  3.71*   HCT  --  24.2*  --   --  26.5*  --  37.0   MCV  --  101*  --   --  100  --  100   MCH  --  31.8  --   --  32.2  --  31.5   MCHC  --  31.4*  --   --  32.1  --  31.6   RDW  --  13.2  --   --  13.1  --  12.9   PLT  --  118*  --   --  113*  --  192    < > = values in this interval not displayed.     INR  Recent Labs   Lab 08/02/19  1938   INR 0.94   PTT 33     ABG  Recent Labs   Lab 08/03/19  1047   O2PER 40      Urine Studies  Recent Labs   Lab Test 12/10/13  2050 05/15/13  1951   COLOR Straw Dark Yellow   APPEARANCE Clear Slightly Cloudy   URINEGLC 70* Negative   URINEBILI Negative Negative   URINEKETONE Negative Negative   SG 1.008 1.011   UBLD Small* Moderate*   URINEPH 6.5 6.0   PROTEIN 100* 300*   NITRITE Negative Negative   LEUKEST Negative Large*   RBCU 2 19*   WBCU 9* 151*     Recent Labs   Lab Test 12/11/13  1915   UTPG 7.42*     PTH  Recent Labs   Lab Test 12/11/13  0601   PTHI 1,131*     Iron Studies  Recent Labs   Lab Test 12/29/16  1338 12/11/13  0600   IRON 137 65   * 245   IRONSAT 60* 26   MONET 1,039*  --        IMAGING:  All imaging studies reviewed by me.     MEDICATIONS:  Current Facility-Administered Medications   Medication     acetaminophen (TYLENOL) tablet 650 mg     acetaminophen (TYLENOL) tablet 650 mg     amLODIPine (NORVASC) tablet 10 mg     anti-thymocyte globulin (THYMOGLOBULIN - Rabbit) 100 mg in sodium chloride 0.9 % intermittent infusion     atorvastatin (LIPITOR) tablet 10 mg     calcium gluconate in D5W 100 mL  intermittent infusion 1 g     cinacalcet (SENSIPAR) tablet 30 mg     dapsone (ACZONE) tablet 100 mg     dextrose 10 % 1,000 mL infusion     dextrose 5% and 0.9% NaCl infusion     dextrose 50 % injection 25 g     glucose gel 15-30 g    Or     dextrose 50 % injection 25-50 mL    Or     glucagon injection 1 mg     glucose gel 15-30 g    Or     dextrose 50 % injection 25-50 mL    Or     glucagon injection 1 mg     diphenhydrAMINE (BENADRYL) capsule 25-50 mg    Or     diphenhydrAMINE (BENADRYL) solution 25-50 mg     furosemide (LASIX) tablet 80 mg     hydrALAZINE (APRESOLINE) injection 10 mg     insulin (regular) (HumuLIN R/NovoLIN R) 1 unit/mL injection 5.4 Units     insulin 1 unit/mL in saline (NovoLIN, HumuLIN Regular) drip - ADULT IV Infusion     [START ON 8/5/2019] levothyroxine (SYNTHROID/LEVOTHROID) half-tab 37.5 mcg     lidocaine (LMX4) cream     lidocaine 1 % 0.1-1 mL     [START ON 8/5/2019] magnesium oxide (MAG-OX) tablet 400 mg     methylPREDNISolone sodium succinate (solu-MEDROL) 250 mg in sodium chloride 0.9 % 50 mL intermittent infusion     multivitamin RENAL (NEPHROCAPS/TRIPHROCAPS) capsule 1 capsule     mycophenolate (GENERIC EQUIVALENT) capsule 750 mg     naloxone (NARCAN) injection 0.1-0.4 mg     naloxone (NARCAN) injection 0.1-0.4 mg     norethindrone (MICRONOR) 0.35 MG per tablet 0.35 mg     ondansetron (ZOFRAN) injection 4 mg     oxyCODONE (ROXICODONE) tablet 5 mg     oxyCODONE (ROXICODONE) tablet 5-10 mg     prochlorperazine (COMPAZINE) injection 5 mg     sevelamer (RENVELA) tablet 2,400 mg     simethicone (MYLICON) chewable tablet 125 mg     sodium chloride (PF) 0.9% PF flush 10 mL     sodium chloride (PF) 0.9% PF flush 10-20 mL     sodium chloride (PF) 0.9% PF flush 3 mL     sodium chloride (PF) 0.9% PF flush 3 mL     sodium chloride 0.45% infusion     sodium chloride 0.9% infusion     sodium lactate 120 mEq, calcium gluconate 1 g, insulin regular (HumuLIN R/NovoLIN R VIAL) 14 Units in dextrose 20  % 500 mL (K+ COCKTAIL ADULT NON-DIABETIC FORMULA) infusion     tacrolimus (GENERIC EQUIVALENT) capsule 1.5 mg     [START ON 8/5/2019] valGANciclovir (VALCYTE) tablet 450 mg     -----  Reviewed the medications,   Please stop birth control. Consider subcutaneous heparin for DVT   Discussed with surgery resident on call, some signs of volume overload, discussed obtaining weight, stop the K -cocktail if infusing.  Check BNP, Trop and get bilateral lower ext ultrasound.   If patient feels that she needs fluid removal will arrange for HD run this evening.  Please call Dr. Jacinto Malcolm (renal fellow to arrange dialysis if needed)

## 2019-08-04 NOTE — PROVIDER NOTIFICATION
Time of notification: 10:06 PM  Provider notified: surgery cross katie OBANDO  Patient status: pt BP is on 180's/110's with HR of 70's , CVP 7-10 , pt is getting Dialysis . Pt is also asking for her regular meds levothyroxine and birth control pills.   Temp:  [97.8  F (36.6  C)-98.9  F (37.2  C)] 98.4  F (36.9  C)  Pulse:  [] 77  Heart Rate:  [] 77  Resp:  [10-22] 18  BP: (103-187)/() 168/108  SpO2:  [91 %-100 %] 100 %  Orders received: do not give prn blood pressure med , and her regular home medications will restart tomorrow. If blood pressure dose not improve with in the next hour call back cross cover Md. Will continue to monitor.

## 2019-08-04 NOTE — PLAN OF CARE
Neuro: A&Ox4. Lethargic from surgery.  Cardiac: BP's have ranged in the 140's-160's/80's-90's. BP (!) 142/99 (BP Location: Right arm, Cuff Size: Adult Regular)   Pulse 90   Temp 98.5  F (36.9  C) (Oral)   Resp 16   Ht 1.524 m (5')   Wt 54 kg (119 lb 0.8 oz)   SpO2 100%   BMI 23.25 kg/m     Respiratory: Sating 100% on RA. CAPNO CO2 35-38 and IPI 9-10  GI/: Very little red/pink urine output (20 ml's all night). No BM yet  Diet/appetite: Tolerating Clear liquid diet. Gets nauseated with swallowing meds and beverages. Gave PRN Zofran  Activity:  Assist of 1-2 up to chair and in halls.  Pain: At acceptable level on current regimen. Gave PRN oxycodone 10 mg as needed.  Skin: No new deficits noted.  LDA's: KISHAN, EARL, Ashley, 3 Lumen internal jugular, 2 PIV    Plan: Continue with POC. Notify primary team with changes.

## 2019-08-04 NOTE — PLAN OF CARE
Neuro: A&Ox4.   Cardiac: SR , CVP 7-10 , BP has been 160-180's/ 110's , potassium was 6.4 , had dialysis at bed side . Bp improved and electrolyte to be rechecked.    Respiratory: Sating 96 % on RA.  GI/: brooks patent with 10 ml red urine out put.   Diet/appetite: Tolerating ice chips , however was nauseated and Zofran given.   Activity:  Assist of  one up to turn and reposition.   Pain: At acceptable level on current regimen.   Skin: No new deficits noted.  LDA's: EARL and brooks patent draining well .   Plan: Continue with POC. Notify primary team with changes.

## 2019-08-04 NOTE — PROVIDER NOTIFICATION
"BP (!) 151/98 (BP Location: Right arm)   Pulse 90   Temp 98.2  F (36.8  C) (Oral)   Resp 20   Ht 1.524 m (5')   Wt 54 kg (119 lb 0.8 oz)   SpO2 93%   BMI 23.25 kg/m      Pt transferred to  from  at 14:00.  Afebrile; VSS on RA.  Pt c/o pain at incision; prn oxycodone provided.  Pt c/o nausea with emesisx1; aromatherapy, zofran, and compazine provided.      MIVF infusing at 75mL/hr despite very minimal UO this shift.  Creatinine 5.9 and pt dialyzed post-transplant yesterday.  80mg IV lasix provided.  Pt reports difficulty taking a deep breath and reports skin feels \"very tight.\"  Lungs diminished in bases. Sats 93% on RA. Provider notified and asked about possibility of decreasing or discontinuing fluids d/t risk of fluid overload.  Awaiting decision.  Will continue to monitor and notify provider with changes.       "

## 2019-08-04 NOTE — PROGRESS NOTES
Transplant Surgery  Inpatient Daily Progress Note  08/04/2019    Assessment & Plan: 27yo woman with history of ESKD 2/2 IgA nephropathy on HD since 12/2013 via LUE AVF MWF s/p DDKT 8/3/19. Other PMH includes SAH in 2014 while on Coumadin and subsequent seizure x2, hypothyroidism, chronic ITP, hyperprolactinemia 2/2 to pituitary adenoma.     Graft function:  Kidney: DDKT 8/3/19. UOP 21 POD0, 40 this am. Creat 5.9 <- 7.7. Renal US in PACU normal, peak velocity 137.  POD0 K 6.4, improved s/p dialysis.  Pain moderately controlled on Tylenol and oxycodone PRN.    Immunosuppression management:   Thymoglobulin 100mg intra-op (not documented, however, absolute lymphocyte 0.0)/100mg  Solu-Medrol 500mg intra-op (also not documented, see above)/250mg  Tacrolimus 1.5mg BID  Cellcept 750mg BID    Hematology:   HGB 7.6 <- 8.5. Continue to monitor.    Cardiorespiratory: BPs elevated overnight, will start on home amlodipine. Hold home doxazosin for now.  Amlodipine 10mg  Saturating well on RA    GI/Nutrition:   Patient with nausea, has not been taking PO. ADAT.     Endocrine:   Blood sugars 90s-130s    Fluid/Electrolytes: K 6.4 post-op, required emergent dialysis. K 4.4 today, continue to monitor  MIVF: D5NS@75  Electrolytes- Mag-Ox starting POD2    :   Ramirez in place, hematuria, keep until POD3  Lasix 80mg     Infectious disease:   Dapsone   Valgancyclovir starting 8/5    Prophylaxis:   IS, SCDs    Disposition: on 6B, transfer to 7A     Medical Decision Making: Medium  Subsequent visit 01628 (moderate level decision making)    Resident: Gisselle Ruggiero, PGY1    Faculty: Dorian Johnson M.D., Ph.D.  _________________________________________________________________  Transplant History: Admitted 8/2/2019 for kidney transplant.  8/3/2019 (Kidney), Postoperative day: 1     Interval History:   Nausea, no vomiting. Pain 4/10 in severity. Has not gotten out of bed yet. Stiffness from yesterday (when K was high) has improved. No gas, no  bm.    ROS:   A 10-point review of systems was negative except as noted above.    Meds:    acetaminophen  650 mg Oral Once     acetaminophen  650 mg Oral Q4H     amLODIPine  10 mg Oral Daily     anti-thymocyte globulin  100 mg Intravenous Central line Once     atorvastatin  10 mg Oral Daily     calcium gluconate  1 g Intravenous Once     cinacalcet  30 mg Oral Daily with supper     dapsone  100 mg Oral Daily     dextrose  25 g Intravenous Once     diphenhydrAMINE  25-50 mg Oral Once    Or     diphenhydrAMINE  25-50 mg Per NG tube Once     furosemide  80 mg Oral Daily     insulin (Human Regular) (HumuLIN R/NovoLIN R) for intravenous use (SHORT ACTING)  0.1 Units/kg Intravenous Once     [START ON 8/5/2019] levothyroxine  37.5 mcg Oral Q Mon Wed Fri AM     [START ON 8/5/2019] magnesium oxide  400 mg Oral Daily with lunch     methylPREDNISolone  250 mg Intravenous Once     multivitamin RENAL  1 capsule Oral Daily     mycophenolate  750 mg Oral BID IS     norethindrone  0.35 mg Oral Daily     sevelamer  2,400 mg Oral TID w/meals     sodium chloride (PF)  10 mL Intracatheter Q8H     sodium chloride (PF)  3 mL Intracatheter Q8H     tacrolimus  1.5 mg Oral BID IS     [START ON 8/5/2019] valGANciclovir  450 mg Oral Once per day on Mon Thu       Physical Exam:     Admit Weight: 54 kg (119 lb 0.8 oz)  Today's weight: 119 lbs .77 oz    Vital sign ranges:    Temp:  [97.6  F (36.4  C)-98.9  F (37.2  C)] 98.6  F (37  C)  Pulse:  [] 90  Heart Rate:  [] 81  Resp:  [10-22] 16  BP: (103-187)/() 138/90  SpO2:  [91 %-100 %] 100 %    GENERAL: Lying in bed, appears uncomfortable.  SKIN: No jaundice. No rashes or lesions.   HEENT: Eyes symmetrical and free of discharge bilaterally.  CV: well perfused  RESPIRATORY: Respirations regular, even, and unlabored.  GI: Soft and non distended. Mild tenderness to palpation, right side. Incision is C/D/I, EARL drain with serosanguinous output.  : Ramirez, hematuria.  EXTREMITIES: No  peripheral edema.  NEUROLOGIC: Alert and orientated x 3. No focal deficits.   MUSCULOSKELETAL: No joint swelling or tenderness.     Data:   CMP  Recent Labs   Lab 08/04/19  0748 08/04/19  0429  08/03/19  1240 08/03/19  1047 08/02/19 1938   NA  --  132*  --  132* 131* 134   POTASSIUM 4.5 4.4   < > 5.0 4.1 3.6   CHLORIDE  --  99  --  100  --  91*   CO2  --  24  --  24  --  35*   GLC  --  116*  --  125* 111* 82   BUN  --  24  --  34*  --  22   CR  --  5.90*  --  7.71*  --  6.11*   GFRESTIMATED  --  9*  --  7*  --  9*   GFRESTBLACK  --  10*  --  8*  --  10*   SHAMIR  --  9.1  --  8.3*  --  9.8   ICAW  --   --   --   --  5.0  --    MAG  --  1.6  --  1.3*  --   --    PHOS  --  6.4*  --  4.7*  --   --    ALBUMIN  --   --   --   --   --  4.2   BILITOTAL  --   --   --   --   --  0.8   ALKPHOS  --   --   --   --   --  65   AST  --   --   --   --   --  4   ALT  --   --   --   --   --  13    < > = values in this interval not displayed.     CBC  Recent Labs   Lab 08/04/19  0748 08/04/19  0429  08/03/19  1240   HGB 7.6* 7.6*   < > 8.5*   WBC  --  10.9  --  9.9   PLT  --  118*  --  113*   A1C  --   --   --  4.8    < > = values in this interval not displayed.     COAGS  Recent Labs   Lab 08/02/19 1938   INR 0.94   PTT 33      Urinalysis  Recent Labs   Lab Test 12/11/13  1915 12/10/13  2050 05/15/13  1951   COLOR  --  Straw Dark Yellow   APPEARANCE  --  Clear Slightly Cloudy   URINEGLC  --  70* Negative   URINEBILI  --  Negative Negative   URINEKETONE  --  Negative Negative   SG  --  1.008 1.011   UBLD  --  Small* Moderate*   URINEPH  --  6.5 6.0   PROTEIN  --  100* 300*   NITRITE  --  Negative Negative   LEUKEST  --  Negative Large*   RBCU  --  2 19*   WBCU  --  9* 151*   UTPG 7.42*  --   --      Virology:  CMV IgG Antibody   Date Value Ref Range Status   12/26/2013 >10.00  Positive for anti-CMV IgG U/mL Final     EBV VCA IgG Antibody   Date Value Ref Range Status   12/26/2013 57.80 U/mL Final     Comment:     Positive, suggests  immunologic exposure.     Hepatitis C Antibody   Date Value Ref Range Status   12/26/2013 Negative NEG Final     Hep B Surface Noemy   Date Value Ref Range Status   12/12/2013 34.4  Final     Comment:     Reactive, Patient is considered to be immune to infection with hepatitis B   when   the value is greater than or equal to 12.0 mlU/mL.

## 2019-08-04 NOTE — PROGRESS NOTES
Nephrology Brief Note    Full consult note to follow tomorrow 8/4    Patient s/p DDKTx earlier today. Poor UOP in this ESRD patient, potassium has increased to 6.4.     VS reviewed  Exam shows trace edema  LUE AVF with button holes with good rumble on auscultation     Labs reviewed.   K of 6.4    A/P  2 hours of iHD ordered with K protocol, 0 UF for good graft perfusion in this patient with delayed graft fxn. Defer to surgery team regarding BP goal with pt hypertensive. Patient consented for dialysis. Discussed with Dr Vee and Dr Wilson.     Jacinto Malcolm MD  Renal Fellow  [phone]

## 2019-08-04 NOTE — TELEPHONE ENCOUNTER
Organ Offer Encounter Information    Organ Offer Information  Organ offer date & time:  8/2/2019 10:39 AM  Coordinator/Fellow/Attending name:  Susan Ivory, RN   Organ(s):   Organ UNOS ID Match Run ID Comment Organ Laterality   Kidney [id number] MNOP       Recent infections?:  No    New medications?:  No Recent pregnancy?:  No   Angicoagulation medications?:  No Recent vaccinations?:  No   Recent blood transfusions?:  No Recent hospitalizations?:  No   Has your insurance changed in the last 6-12 months?:  Neg    Patient last dialyzed:  7/31/2019 10:41 AM  Dialysis type:  Hemo  Discussed organ offer with:  Patient  Discussed risk category with Patient/Other:  DCD  Did not understand donor criteria, questions answered, verbalized understanding  Patient/Other asked to speak to a surgeon?:  No  Discussed program-specific outcomes:  Did not have questions regarding SRTR  Right to decline organ offer without penalty, Patient/Other:  Aware of option to decline without penalty  Organ offer decision status Patient/Other:  Accepted Offer  Additional Comments:  8/2/2019 11:03 AM  Kidney: Backup  MD: Rishabh/Alex  [facility]: [facility]  Plan (XM, NPO, Donor OR): Virtual XM compatible. Called patient regarding backup kidney offer. No donor OR set yet. Discussed donor was DCD--she asked questions and understood. Wants to move forward with offer. Plan to get drawn at [facility] before dialysis today at 1pm. Placed orders. Called lab to notify-spoke with Kelly. Called [place]sha in Immunology. Provided patient with callback information--will update with timing.  Susan Ivory  Transplant Coordinator    8/2/2019 1:15 PM:  Patient has become primary without choice on kidney. Informed Dr. Keating and Dr. Johnson. Patient currently at dialysis. Plan to admit after--around 1730. Called patient to inform--provided admission instructions-she understood. Donor OR set for 1600. Requested kidney be placed on pump to [facility]  per request of Dr. Johnson.  Susan Ivory  Transplant Coordinator    Admissions: [facility] 1317  Unit: Delicia 1320--informed patient does not need final XM  Update Provider Entering Orders: Paged [phone]--Nava call back [phone]--informed final XM doesn't need to be ordered.   Immunology: 1326-Informed of admit--final XM due to be back 1930  Book OR: Pending  Vessel Storage Confirmation: N/A Kidney  Blood Bank: Pending  Research: Done 1328  TransNet/ABO Verification: Pending  Add Organ: Pending    8/2/2019 5:47 PM:  Final XM Compatible-DSA to DR4-relayed to Dr. Johnson and Dr. Rishabh Iovry  Transplant Coordinator    8:32 PM  August 2, 2019  OR booked for 6:00 AM with Tracey; laterality pending  Daisy Zamudio RN, BA  On Call Organ Coordinator    12:00 AM  August 3, 2019  Trannet/ABO verification done  Add Organ done  Notified Estefany of laterality and confirmed receipt of label and organ verification.  Xmatch results sent to Dr. Keating.  Daisy Zamudio RN, BA  On Call Organ Coordinator    12:48 AM  August 4, 2019  Removed from UNOS wait list for kidney transplant.  Daisy Zamudio RN, BA  On Call Organ Coordinator      Attestation I have discussed all of the above with the Patient/Legal Guardian/Caregiver regarding this organ offer.:  Yes  Coordinator/Fellow/Attending name:  Susan Ivory RN

## 2019-08-04 NOTE — PLAN OF CARE
"BP (!) 151/98 (BP Location: Right arm)   Pulse 90   Temp 98.3  F (36.8  C) (Oral)   Resp 20   Ht 1.524 m (5')   Wt 54 kg (119 lb 0.8 oz)   SpO2 94%   BMI 23.25 kg/m      Afebrile; tachycardic (100-110's), 's/70-90's; sats 94% on RA.  Last potassium check 4.5  Pt c/o skin tightness, difficulty breathing, and reports she feels \"fluid overloaded,\" requests dialysis.  Fluids stopped; MD notified again.  MD assessed pt; CXR ordered.  Awaiting CXR to restart fluids and start thymoglobulin.  Pt reports continued nausea but was able to tolerate immunosuppressants.  Ramirez patent with 33mL output since transfer to  despite 80mg IV lasix.  Bowel sounds present; no flatus; pt reports abdominal fullness and abdomen distended.  Incision sealed with liquid bandage.  EARL with scant serosanguinous output.  Continue to monitor fluid status and notify provider with changes.  "

## 2019-08-04 NOTE — PROVIDER NOTIFICATION
BP (!) 151/98 (BP Location: Right arm)   Pulse 90   Temp 98.3  F (36.8  C) (Oral)   Resp 20   Ht 1.524 m (5')   Wt 54 kg (119 lb 0.8 oz)   SpO2 94%   BMI 23.25 kg/m      MD notified of tachycardia 100-110's.  Instructed to hold thymoglobulin for now.  EKG and BMP ordered; awaiting collection.  Will continue to monitor and notify provider with changes.

## 2019-08-05 ENCOUNTER — TELEPHONE (OUTPATIENT)
Dept: TRANSPLANT | Facility: CLINIC | Age: 29
End: 2019-08-05

## 2019-08-05 ENCOUNTER — DOCUMENTATION ONLY (OUTPATIENT)
Dept: TRANSPLANT | Facility: CLINIC | Age: 29
End: 2019-08-05

## 2019-08-05 LAB
ANION GAP SERPL CALCULATED.3IONS-SCNC: 6 MMOL/L (ref 3–14)
BASOPHILS # BLD AUTO: 0 10E9/L (ref 0–0.2)
BASOPHILS NFR BLD AUTO: 0 %
BUN SERPL-MCNC: 17 MG/DL (ref 7–30)
CALCIUM SERPL-MCNC: 9.2 MG/DL (ref 8.5–10.1)
CHLORIDE SERPL-SCNC: 100 MMOL/L (ref 94–109)
CMV IGG SERPL QL IA: >8 AI (ref 0–0.8)
CMV IGM SERPL QL IA: 0.2 AI (ref 0–0.8)
CO2 SERPL-SCNC: 27 MMOL/L (ref 20–32)
CREAT SERPL-MCNC: 4.24 MG/DL (ref 0.52–1.04)
DIFFERENTIAL METHOD BLD: ABNORMAL
EBV VCA IGG SER QL IA: 3.7 AI (ref 0–0.8)
EBV VCA IGM SER QL IA: <0.2 AI (ref 0–0.8)
EOSINOPHIL # BLD AUTO: 0 10E9/L (ref 0–0.7)
EOSINOPHIL NFR BLD AUTO: 0 %
ERYTHROCYTE [DISTWIDTH] IN BLOOD BY AUTOMATED COUNT: 12.8 % (ref 10–15)
GFR SERPL CREATININE-BSD FRML MDRD: 13 ML/MIN/{1.73_M2}
GLUCOSE BLDC GLUCOMTR-MCNC: 112 MG/DL (ref 70–99)
GLUCOSE BLDC GLUCOMTR-MCNC: 116 MG/DL (ref 70–99)
GLUCOSE BLDC GLUCOMTR-MCNC: 126 MG/DL (ref 70–99)
GLUCOSE BLDC GLUCOMTR-MCNC: 127 MG/DL (ref 70–99)
GLUCOSE SERPL-MCNC: 126 MG/DL (ref 70–99)
HBV SURFACE AB SERPL IA-ACNC: 63.15 M[IU]/ML
HBV SURFACE AG SERPL QL IA: NONREACTIVE
HCT VFR BLD AUTO: 25 % (ref 35–47)
HGB BLD-MCNC: 7.7 G/DL (ref 11.7–15.7)
INTERPRETATION ECG - MUSE: NORMAL
LYMPHOCYTES # BLD AUTO: 0 10E9/L (ref 0.8–5.3)
LYMPHOCYTES NFR BLD AUTO: 0 %
MACROCYTES BLD QL SMEAR: PRESENT
MAGNESIUM SERPL-MCNC: 1.7 MG/DL (ref 1.6–2.3)
MCH RBC QN AUTO: 31.4 PG (ref 26.5–33)
MCHC RBC AUTO-ENTMCNC: 30.8 G/DL (ref 31.5–36.5)
MCV RBC AUTO: 102 FL (ref 78–100)
MONOCYTES # BLD AUTO: 0 10E9/L (ref 0–1.3)
MONOCYTES NFR BLD AUTO: 0.9 %
NEUTROPHILS # BLD AUTO: 5.1 10E9/L (ref 1.6–8.3)
NEUTROPHILS NFR BLD AUTO: 99.1 %
PHOSPHATE SERPL-MCNC: 4.2 MG/DL (ref 2.5–4.5)
PLATELET # BLD AUTO: 75 10E9/L (ref 150–450)
PLATELET # BLD EST: ABNORMAL 10*3/UL
POTASSIUM SERPL-SCNC: 3.9 MMOL/L (ref 3.4–5.3)
POTASSIUM SERPL-SCNC: 4.2 MMOL/L (ref 3.4–5.3)
POTASSIUM SERPL-SCNC: 4.3 MMOL/L (ref 3.4–5.3)
POTASSIUM SERPL-SCNC: 4.3 MMOL/L (ref 3.4–5.3)
POTASSIUM SERPL-SCNC: 4.6 MMOL/L (ref 3.4–5.3)
POTASSIUM SERPL-SCNC: 4.6 MMOL/L (ref 3.4–5.3)
RBC # BLD AUTO: 2.45 10E12/L (ref 3.8–5.2)
SODIUM SERPL-SCNC: 132 MMOL/L (ref 133–144)
TACROLIMUS BLD-MCNC: <3 UG/L (ref 5–15)
TME LAST DOSE: ABNORMAL H
WBC # BLD AUTO: 5.1 10E9/L (ref 4–11)

## 2019-08-05 PROCEDURE — 80197 ASSAY OF TACROLIMUS: CPT | Performed by: SURGERY

## 2019-08-05 PROCEDURE — 36415 COLL VENOUS BLD VENIPUNCTURE: CPT | Performed by: STUDENT IN AN ORGANIZED HEALTH CARE EDUCATION/TRAINING PROGRAM

## 2019-08-05 PROCEDURE — 25000132 ZZH RX MED GY IP 250 OP 250 PS 637: Mod: GY | Performed by: STUDENT IN AN ORGANIZED HEALTH CARE EDUCATION/TRAINING PROGRAM

## 2019-08-05 PROCEDURE — 25000132 ZZH RX MED GY IP 250 OP 250 PS 637: Mod: GY | Performed by: NURSE PRACTITIONER

## 2019-08-05 PROCEDURE — 82955 ASSAY OF G6PD ENZYME: CPT | Performed by: STUDENT IN AN ORGANIZED HEALTH CARE EDUCATION/TRAINING PROGRAM

## 2019-08-05 PROCEDURE — 25800030 ZZH RX IP 258 OP 636: Performed by: NURSE ANESTHETIST, CERTIFIED REGISTERED

## 2019-08-05 PROCEDURE — 80048 BASIC METABOLIC PNL TOTAL CA: CPT | Performed by: STUDENT IN AN ORGANIZED HEALTH CARE EDUCATION/TRAINING PROGRAM

## 2019-08-05 PROCEDURE — 85025 COMPLETE CBC W/AUTO DIFF WBC: CPT | Performed by: STUDENT IN AN ORGANIZED HEALTH CARE EDUCATION/TRAINING PROGRAM

## 2019-08-05 PROCEDURE — 84100 ASSAY OF PHOSPHORUS: CPT | Performed by: STUDENT IN AN ORGANIZED HEALTH CARE EDUCATION/TRAINING PROGRAM

## 2019-08-05 PROCEDURE — 84132 ASSAY OF SERUM POTASSIUM: CPT | Performed by: STUDENT IN AN ORGANIZED HEALTH CARE EDUCATION/TRAINING PROGRAM

## 2019-08-05 PROCEDURE — 25000132 ZZH RX MED GY IP 250 OP 250 PS 637: Mod: GY | Performed by: SURGERY

## 2019-08-05 PROCEDURE — 00000146 ZZHCL STATISTIC GLUCOSE BY METER IP

## 2019-08-05 PROCEDURE — 12000026 ZZH R&B TRANSPLANT

## 2019-08-05 PROCEDURE — 83735 ASSAY OF MAGNESIUM: CPT | Performed by: STUDENT IN AN ORGANIZED HEALTH CARE EDUCATION/TRAINING PROGRAM

## 2019-08-05 PROCEDURE — 25000131 ZZH RX MED GY IP 250 OP 636 PS 637: Mod: GY | Performed by: NURSE PRACTITIONER

## 2019-08-05 PROCEDURE — 25800025 ZZH RX 258: Performed by: STUDENT IN AN ORGANIZED HEALTH CARE EDUCATION/TRAINING PROGRAM

## 2019-08-05 PROCEDURE — 93005 ELECTROCARDIOGRAM TRACING: CPT

## 2019-08-05 PROCEDURE — 25000125 ZZHC RX 250: Performed by: NURSE ANESTHETIST, CERTIFIED REGISTERED

## 2019-08-05 PROCEDURE — 36592 COLLECT BLOOD FROM PICC: CPT | Performed by: STUDENT IN AN ORGANIZED HEALTH CARE EDUCATION/TRAINING PROGRAM

## 2019-08-05 PROCEDURE — 25000131 ZZH RX MED GY IP 250 OP 636 PS 637: Mod: GY | Performed by: SURGERY

## 2019-08-05 PROCEDURE — 36592 COLLECT BLOOD FROM PICC: CPT | Performed by: SURGERY

## 2019-08-05 PROCEDURE — 25000128 H RX IP 250 OP 636: Performed by: STUDENT IN AN ORGANIZED HEALTH CARE EDUCATION/TRAINING PROGRAM

## 2019-08-05 PROCEDURE — 25800030 ZZH RX IP 258 OP 636: Performed by: STUDENT IN AN ORGANIZED HEALTH CARE EDUCATION/TRAINING PROGRAM

## 2019-08-05 RX ORDER — LEVOTHYROXINE SODIUM 25 UG/1
25 TABLET ORAL
Status: DISCONTINUED | OUTPATIENT
Start: 2019-08-06 | End: 2019-08-12 | Stop reason: HOSPADM

## 2019-08-05 RX ORDER — ASPIRIN 81 MG/1
81 TABLET, CHEWABLE ORAL DAILY
Status: DISCONTINUED | OUTPATIENT
Start: 2019-08-05 | End: 2019-08-12 | Stop reason: HOSPADM

## 2019-08-05 RX ORDER — HYDROMORPHONE HYDROCHLORIDE 2 MG/1
2-4 TABLET ORAL EVERY 4 HOURS PRN
Status: DISCONTINUED | OUTPATIENT
Start: 2019-08-05 | End: 2019-08-08

## 2019-08-05 RX ORDER — OXYBUTYNIN CHLORIDE 5 MG/1
5 TABLET ORAL 3 TIMES DAILY PRN
Status: DISPENSED | OUTPATIENT
Start: 2019-08-05 | End: 2019-08-08

## 2019-08-05 RX ORDER — DIPHENHYDRAMINE HCL 25 MG
25-50 CAPSULE ORAL ONCE
Status: COMPLETED | OUTPATIENT
Start: 2019-08-05 | End: 2019-08-05

## 2019-08-05 RX ORDER — METHYLPREDNISOLONE SODIUM SUCCINATE 125 MG/2ML
INJECTION, POWDER, LYOPHILIZED, FOR SOLUTION INTRAMUSCULAR; INTRAVENOUS
Status: DISCONTINUED
Start: 2019-08-05 | End: 2019-08-05 | Stop reason: CLARIF

## 2019-08-05 RX ORDER — ATROPA BELLADONNA AND OPIUM 16.2; 3 MG/1; MG/1
15 SUPPOSITORY RECTAL EVERY 8 HOURS PRN
Status: DISCONTINUED | OUTPATIENT
Start: 2019-08-05 | End: 2019-08-12 | Stop reason: HOSPADM

## 2019-08-05 RX ORDER — AMOXICILLIN 250 MG
1 CAPSULE ORAL 2 TIMES DAILY
Status: DISCONTINUED | OUTPATIENT
Start: 2019-08-05 | End: 2019-08-07

## 2019-08-05 RX ORDER — TACROLIMUS 1 MG/1
2 CAPSULE ORAL
Status: DISCONTINUED | OUTPATIENT
Start: 2019-08-05 | End: 2019-08-07

## 2019-08-05 RX ORDER — DAPSONE 100 MG/1
100 TABLET ORAL
Status: DISCONTINUED | OUTPATIENT
Start: 2019-08-05 | End: 2019-08-08

## 2019-08-05 RX ADMIN — SIMETHICONE CHEW TAB 80 MG 80 MG: 80 TABLET ORAL at 16:14

## 2019-08-05 RX ADMIN — OXYBUTYNIN CHLORIDE 5 MG: 5 TABLET ORAL at 12:29

## 2019-08-05 RX ADMIN — ACETAMINOPHEN 650 MG: 325 TABLET, FILM COATED ORAL at 12:29

## 2019-08-05 RX ADMIN — VALGANCICLOVIR 450 MG: 450 TABLET, FILM COATED ORAL at 08:58

## 2019-08-05 RX ADMIN — ANTI-THYMOCYTE GLOBULIN (RABBIT) 100 MG: 5 INJECTION, POWDER, LYOPHILIZED, FOR SOLUTION INTRAVENOUS at 02:22

## 2019-08-05 RX ADMIN — ACETAMINOPHEN 650 MG: 325 TABLET, FILM COATED ORAL at 03:39

## 2019-08-05 RX ADMIN — ACETAMINOPHEN 650 MG: 325 TABLET, FILM COATED ORAL at 16:14

## 2019-08-05 RX ADMIN — CINACALCET HYDROCHLORIDE 30 MG: 30 TABLET, COATED ORAL at 16:14

## 2019-08-05 RX ADMIN — TACROLIMUS 1.5 MG: 1 CAPSULE ORAL at 08:58

## 2019-08-05 RX ADMIN — MAGNESIUM OXIDE TAB 400 MG (241.3 MG ELEMENTAL MG) 400 MG: 400 (241.3 MG) TAB at 12:29

## 2019-08-05 RX ADMIN — ACETAMINOPHEN 650 MG: 325 TABLET, FILM COATED ORAL at 08:59

## 2019-08-05 RX ADMIN — HYDROMORPHONE HYDROCHLORIDE 2 MG: 2 TABLET ORAL at 13:44

## 2019-08-05 RX ADMIN — SIMETHICONE CHEW TAB 80 MG 80 MG: 80 TABLET ORAL at 04:53

## 2019-08-05 RX ADMIN — ASPIRIN 81 MG CHEWABLE TABLET 81 MG: 81 TABLET CHEWABLE at 12:29

## 2019-08-05 RX ADMIN — Medication 37.5 MCG: at 12:29

## 2019-08-05 RX ADMIN — SIMETHICONE CHEW TAB 80 MG 80 MG: 80 TABLET ORAL at 10:32

## 2019-08-05 RX ADMIN — SENNOSIDES AND DOCUSATE SODIUM 1 TABLET: 8.6; 5 TABLET ORAL at 19:59

## 2019-08-05 RX ADMIN — SEVELAMER CARBONATE 2400 MG: 800 TABLET, FILM COATED ORAL at 08:59

## 2019-08-05 RX ADMIN — SENNOSIDES AND DOCUSATE SODIUM 1 TABLET: 8.6; 5 TABLET ORAL at 08:59

## 2019-08-05 RX ADMIN — FUROSEMIDE 80 MG: 10 INJECTION, SOLUTION INTRAMUSCULAR; INTRAVENOUS at 13:44

## 2019-08-05 RX ADMIN — ACETAMINOPHEN 650 MG: 325 TABLET, FILM COATED ORAL at 01:09

## 2019-08-05 RX ADMIN — AMLODIPINE BESYLATE 10 MG: 10 TABLET ORAL at 08:59

## 2019-08-05 RX ADMIN — MYCOPHENOLATE MOFETIL 750 MG: 250 CAPSULE ORAL at 18:13

## 2019-08-05 RX ADMIN — ONDANSETRON 4 MG: 2 INJECTION INTRAMUSCULAR; INTRAVENOUS at 15:02

## 2019-08-05 RX ADMIN — ATORVASTATIN CALCIUM 10 MG: 10 TABLET, FILM COATED ORAL at 08:59

## 2019-08-05 RX ADMIN — SEVELAMER CARBONATE 800 MG: 800 TABLET, FILM COATED ORAL at 18:14

## 2019-08-05 RX ADMIN — DAPSONE 100 MG: 100 TABLET ORAL at 08:59

## 2019-08-05 RX ADMIN — SODIUM CHLORIDE 250 MG: 9 INJECTION, SOLUTION INTRAVENOUS at 01:32

## 2019-08-05 RX ADMIN — Medication 0.2 MG: at 01:02

## 2019-08-05 RX ADMIN — FUROSEMIDE 80 MG: 10 INJECTION, SOLUTION INTRAMUSCULAR; INTRAVENOUS at 19:59

## 2019-08-05 RX ADMIN — ACETAMINOPHEN 650 MG: 325 TABLET, FILM COATED ORAL at 19:59

## 2019-08-05 RX ADMIN — Medication 1 CAPSULE: at 08:58

## 2019-08-05 RX ADMIN — OXYBUTYNIN CHLORIDE 5 MG: 5 TABLET ORAL at 18:14

## 2019-08-05 RX ADMIN — MYCOPHENOLATE MOFETIL 750 MG: 250 CAPSULE ORAL at 08:58

## 2019-08-05 RX ADMIN — FUROSEMIDE 80 MG: 10 INJECTION, SOLUTION INTRAMUSCULAR; INTRAVENOUS at 08:59

## 2019-08-05 RX ADMIN — Medication 2.5 MG: at 03:39

## 2019-08-05 RX ADMIN — DIPHENHYDRAMINE HYDROCHLORIDE 25 MG: 25 CAPSULE ORAL at 01:21

## 2019-08-05 RX ADMIN — DEXTROSE AND SODIUM CHLORIDE: 5; 450 INJECTION, SOLUTION INTRAVENOUS at 01:34

## 2019-08-05 RX ADMIN — Medication 2.5 MG: at 04:53

## 2019-08-05 RX ADMIN — ONDANSETRON 4 MG: 2 INJECTION INTRAMUSCULAR; INTRAVENOUS at 21:01

## 2019-08-05 RX ADMIN — TACROLIMUS 2 MG: 1 CAPSULE ORAL at 18:13

## 2019-08-05 ASSESSMENT — MIFFLIN-ST. JEOR
SCORE: 1233.5
SCORE: 1233.5
SCORE: 1243.92

## 2019-08-05 ASSESSMENT — ACTIVITIES OF DAILY LIVING (ADL)
ADLS_ACUITY_SCORE: 14
ADLS_ACUITY_SCORE: 13
ADLS_ACUITY_SCORE: 14
ADLS_ACUITY_SCORE: 12
ADLS_ACUITY_SCORE: 13
ADLS_ACUITY_SCORE: 13

## 2019-08-05 NOTE — PLAN OF CARE
BP (!) 136/97 (BP Location: Right arm)   Pulse 90   Temp 98.3  F (36.8  C) (Oral)   Resp 15   Ht 1.524 m (5')   Wt 57.2 kg (126 lb 1.7 oz)   SpO2 96%   BMI 24.63 kg/m       1738-6863:  Diastolic HTN (136/97), AOVSS on RA. NSR on tele. Endorses low abd pain & discomfort with urination; given PRN IV dilaudid 0.2 mg x 2, PRN oxycodone 2.5-5 mg x 3, PRN simethicone x 2 & scheduled tylenol 650 with some relief. Pt educated on use of PO pain med as compared to IV with verbal acknowledgement. Endorsed nausea intermittently at the beginning of the shift prior to HD; given PRN zofran 4 mg with some relief.     Endorsed SOB at rest; prior to HD RR 20, post-HD RR 14-16. Tele set up prior to HD initiation, cross-cover paged regarding sudden increase in HR from low 100s to 120s. EKG completed. Cross-cover rounded with no interventions ordered; HD complete at ~0100, pre-meds given and thymo started. See end of note related to thymoglobulin.     R low abd incision WDL, dermabonded. R EARL dressing CDI, output bright serosanguineous 30 mL. Ramirez catheter positional, 40 mL cherry UO for entire shift. Bladder scan negative. No BM, bowel sounds hypoactive & faint. Pt feels like she has gas pains but is not passing gas, no BM. AMANDA LE US complete, negative for DVTs. Pneumo boots on. Pt encouraged to get up to ambulate today to help with gas discomfort and bowels. R internal jugular triple lumen infusing D5 1/2 NS @ 50 mL/hr via blue lumen, thymo to complete by 0800 via red lumen.  at bedside. Will continue to monitor and update kidney team with acute changes.     Re thymo: Writer was told in report that thymoglobulin was not given on day shift per instruction from MD; see ERMELINDA Murray's note. Pt was supposed to dialyze at beginning of writer's shift, had to hold thymo until dialysis complete. Spoke with Dr. Keating regarding thymo at ~2000.

## 2019-08-05 NOTE — ADDENDUM NOTE
Addendum  created 08/05/19 1338 by Vera Velasquez APRN CRNA    Intraprocedure Meds edited, Order list changed

## 2019-08-05 NOTE — PROGRESS NOTES
HEMODIALYSIS TREATMENT NOTE    Date: 8/5/2019  Time: 1:24 AM    Data:  Pre Wt: 59.6 kg (131 lb 6.3 oz)   Desired Wt: 56.6 kg   Post Wt: 57.2 kg (126 lb 1.7 oz)  Weight change: 2.4 kg  Ultrafiltration - Post Run Net Total Removed (mL): 2500 mL  Vascular Access Status: patent  Dialyzer Rinse: Streaked  Total Blood Volume Processed: 75.8 Liters   Total Dialysis (Treatment) Time: 3 hrs      Lab:   No    Interventions:  HD access (AV-fistula buttonhole) cannulated by Pt with blunt 15g-needles w/o difficulty,   UF paused,  goal lowered by 500ml during HD due to brief episode of ST during dialysis,   MD (Gold) paged, K bath switched from 2K to 4K per order.   HD access held for 5 mins post-HD, hemostasis achieved     Assessment:  BP within acceptable range during dialysis,   Pt had a brief episode of V-runs with Sinus Tachycardia during dialysis,   Pt also complained of chest pain that lasted for a few mins during treatment,   Pt seen by MD during dialysis, EKG ordered and obtained,   Pt verbalized relief after interventions,   VSS, dialysis well tolerated, ECG-SR, no concerns noted,   Pt was stable post-HD   Hand-off report given to bedside RN     Plan:    Next HD per renal team.

## 2019-08-05 NOTE — PLAN OF CARE
BP (!) 137/93   Pulse 90   Temp 99.1  F (37.3  C) (Oral)   Resp 18   Ht 1.524 m (5')   Wt 58.2 kg (128 lb 6.4 oz)   SpO2 92%   BMI 25.08 kg/m       Shift: 2254-7873  VS: HR tachy 's. OVSS on RA                          Neuro: A&O  Mobility: Ax1 with walker. Pt ambulated in the halls.  Pain/Nausea/Vomiting: c/o bladder spasms and gas pain. Received simethicone x1, oxybutynin x1.   Endocrine:   Diet: Regular, fair appetite  LDAs: R 3L internal jugular with TKO line. PIV x2 saline locked. L fistula +bruit and thrill. R EARL with scant amounts serosang op.   Skin: RLQ incision dermabonded, EMILY. No erythema.   GI/: Ramirez with low UOP. On 80mg IV lasix TID. No BM, pt c/o gas discomfort--PRN simethicone given.  Tests/Procedures: None  Labs: Creat 4.24 after dialysis last night. Other labs WNL  Education: Transplant meds reviewed.   Plan: Plan for IV solumed, IV cellcept and IV thymo tonight at midnight. Will continue to monitor and notify of any changes.

## 2019-08-05 NOTE — PROGRESS NOTES
Nephrology Brief Note    Patient hypervolemic, hypertensive, and tachypnic. Surgery team working up potential respiratory distress but will also perform hemodialysis with UF in this ESRD patient with DDKTx with DGF.     iHD ordered for 3 hours, 2.5kg-3kg off of UF, K protocol, no heparin. Discussed with dialysis nurse on call.     Discussed with Dr Wilson.     Jacinto Malcolm MD  Renal Fellow  [phone]

## 2019-08-05 NOTE — PROGRESS NOTES
Cross-cover note: 11:02 PM 8/4/2019 08/04/2019    General Surgery Cross Cover Note    Called by nursing regarding tachycardia during HD, 120s with brief spike to 200    Per patient having palpitations.  Mildly SOB, stable since prior to HD.  Originally no CP but now noting mild midsternal chest pain, also c/o anxiety to RN given heart rate changes.  Upon reevaluation, CP resolved; does note mild R scapular discomfort.    B/P: 130/99, T: 98.6, P: 90, R: 20    PE:  A&O x3, NAD  Breathing non-labored  Heart regular, tachycardic, systolic murmur - per pt this has been noted before   Extr. Warm to touch  No bleeding around HD caths per RN     Plan:  - STAT EKG obtained  - Likely related to hemodynamic and electrolyte shifts during dialysis   - Continue to monitor   - Dr. Keating notified     Katherine Clifford MD  Surgery Resident PGY-1  Pg [phone]

## 2019-08-05 NOTE — PROGRESS NOTES
PATHOLOGY HLA CROSSMATCH CONSULTATION: DONOR/RECIPIENT  VIRTUAL CROSSMATCH - Kidney  Consultation Date: 2019  Consultation Requested by: Dr. Brooks Keating    Regarding: Compatibility of  donor organ UNOS #[id number] from OPO: MNOP with Mona Wagner    Findings: Regarding a virtual crossmatch between Mona Wagner and  donor listed above (match ID [id number]):  The most recent (19) and 10 additional patient serum/sera  were analyzed.  The patient has no antibodies listed with HLA specificity against the donor organ.      Record Review Indicates: I personally reviewed the most recent serum, the historic peak sera, and all other sera with solid-phase HLA Single Antigen test results:  The patient has no HLA antibodies against the donor organ.     The results of this virtual XM are:   -most recent serum: compatible   -peak #1: compatible  -peak #2: compatible    NB: This organ offers expresses the rare DPB1*81:01 allele for which no bead has been tested.  However, epitope analysis supports the lack of anti-DP DSA for this offer.    Disclaimer: Clinical judgement must take into account other factors, such as non-HLA antibodies not detected in the assay. The VXM gives probabilities only.  The probability does not account for the potential for auto-antibodies that may be present in the patient's serum.  These autoantibodies may render the physical crossmatch falsely positive, and would be detected by an autologous crossmatch.  When possible, confirm findings with prospective allogeneic and autologous flow crossmatches before going to transplant as clinically indicated.     Seb Abbott, PhD,[facility]  Medical Director, Immunology/Histocompatibility Laboratory  Pager [phone]

## 2019-08-05 NOTE — PROGRESS NOTES
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 Other PMH includes SAH in 2014 while on Coumadin and subsequent seizure x2, hypothyroidism, chronic ITP, hyperprolactinemia 2/2 to pituitary adenoma.      Graft function:  Kidney: DDKT 8/3/19. Low UOP, 70 today. Creat 4.24 <- 5.9 <- 7.7. Renal US in PACU normal, peak velocity 137.  POD0 K 6.4, improved s/p dialysis.  POD1 dialysis for hypervolemia, hypertension, tachypneic  Pain moderately controlled on Tylenol and PO Dilaudid (d/c'd PO oxy and IV Dil 2/2 nausea)     Immunosuppression management:   Thymoglobulin 100mg intra-op (not documented, however, absolute lymphocyte 0.0)/100mg (received in early am 8/5)  Solu-Medrol 500mg intra-op (also not documented, see above)/250mg  Tacrolimus 2mg BID (increased 8/5 from 1.5)  Cellcept 750mg BID     Hematology:   HGB 7.7 <- 7.6 <- 8.5. Continue to monitor.     Cardiorespiratory: POD1 Difficulty breathing, chest fullness, leg tightness in evening. BNP elevated, weight up otherwise labs, troponin, EKG, BLE US negative. Improved with dialysis.  Aspirin 81 (graft with 3 arteries)  Atorvastatin 10mg  Amlodipine 10mg  Hydralazine PRN     GI/Nutrition:   Patient with nausea, no vomiting, has not been taking PO. Zofran PRN. ADAT.   Zofran PRN  Simethicone     Endocrine:   Blood sugars 120s  Levothyroxine 37.5 mcg     Fluid/Electrolytes: K 6.4 post-op, required emergent dialysis. K 4.3 this pm, continue to monitor  Electrolytes- Mag-Ox     :   Ramirez in place, hematuria, keep until POD3  IV Lasix 80mg TID  B&O suppository PRN, Ditropan PRN     Infectious disease:   Holding Dapsone pending G6PD result  Valgancyclovir starting 8/5     Prophylaxis:   IS, SCDs     Disposition: 7A      Medical Decision Making: Medium  Subsequent visit 82032 (moderate level decision making)    Resident: Gisselle  Bahman, PGY1    Faculty: Dr. Michel  _________________________________________________________________  Transplant History: Admitted 8/2/2019 for kidney transplant.  8/3/2019 (Kidney), Postoperative day: 2     Interval History:  Patient feeling improved from yesterday. Still with nausea though has improved, no vomiting. No chest pain or shortness of breath. Reports feeling like having bladder spasms.    ROS:   A 10-point review of systems was negative except as noted above.    Meds:    acetaminophen  650 mg Oral Q4H     amLODIPine  10 mg Oral Daily     aspirin  81 mg Oral Daily     atorvastatin  10 mg Oral Daily     cinacalcet  30 mg Oral Daily with supper     furosemide  80 mg Intravenous TID     levothyroxine  37.5 mcg Oral Q Mon Wed Fri AM     [START ON 8/6/2019] levothyroxine  25 mcg Oral Once per day on Sun Tue Thu Sat     magnesium oxide  400 mg Oral Daily with lunch     multivitamin RENAL  1 capsule Oral Daily     mycophenolate  750 mg Oral BID IS     senna-docusate  1 tablet Oral BID     sevelamer  2,400 mg Oral TID w/meals     sodium chloride (PF)  10 mL Intracatheter Q8H     tacrolimus  2 mg Oral BID IS     valGANciclovir  450 mg Oral Once per day on Mon Thu       Physical Exam:     Admit Weight: 54 kg (119 lb 0.8 oz)  Today's weight: 128 lbs 6.4 oz    Vital sign ranges:    Temp:  [97.7  F (36.5  C)-99.1  F (37.3  C)] 98.5  F (36.9  C)  Heart Rate:  [] 103  Resp:  [15-20] 18  BP: (122-151)/() 137/93  SpO2:  [92 %-98 %] 92 %    GENERAL: Sitting up in a chair  SKIN: No jaundice. No rashes or lesions.   HEENT: Eyes symmetrical and free of discharge bilaterally.  CV: well perfused  RESPIRATORY: Respirations regular, even, and unlabored  GI: Soft and minimally distended. Mild tenderness to palpation, right side. Incision with dermabond, C/D/I, EARL drain with serosanguinous output.  : Ramirez, hematuria.  EXTREMITIES: Mild peripheral edema. L AVF +thrill.  NEUROLOGIC: Alert and orientated x 3.  No focal deficits.   MUSCULOSKELETAL: No joint swelling or tenderness.      Data:   CMP  Recent Labs   Lab 08/05/19  1519 08/05/19  0937 08/05/19  0532  08/04/19  1900  08/04/19  0429  08/03/19  1047 08/02/19  1938   NA  --   --  132*  --  131*  --  132*   < > 131* 134   POTASSIUM 4.3 4.6 4.6   < > 4.5   < > 4.4   < > 4.1 3.6   CHLORIDE  --   --  100  --  99  --  99   < >  --  91*   CO2  --   --  27  --  24  --  24   < >  --  35*   GLC  --   --  126*  --  120*  --  116*   < > 111* 82   BUN  --   --  17  --  36*  --  24   < >  --  22   CR  --   --  4.24*  --  7.58*  --  5.90*   < >  --  6.11*   GFRESTIMATED  --   --  13*  --  7*  --  9*   < >  --  9*   GFRESTBLACK  --   --  16*  --  8*  --  10*   < >  --  10*   SHAMIR  --   --  9.2  --  9.0  --  9.1   < >  --  9.8   ICAW  --   --   --   --   --   --   --   --  5.0  --    MAG  --   --  1.7  --   --   --  1.6   < >  --   --    PHOS  --   --  4.2  --   --   --  6.4*   < >  --   --    ALBUMIN  --   --   --   --   --   --   --   --   --  4.2   BILITOTAL  --   --   --   --   --   --   --   --   --  0.8   ALKPHOS  --   --   --   --   --   --   --   --   --  65   AST  --   --   --   --   --   --   --   --   --  4   ALT  --   --   --   --   --   --   --   --   --  13    < > = values in this interval not displayed.     CBC  Recent Labs   Lab 08/05/19  0532 08/04/19  1158  08/04/19  0429 08/03/19  1240   HGB 7.7* 7.6*   < > 7.6*   < > 8.5*   WBC 5.1  --   --  10.9  --  9.9   PLT 75*  --   --  118*  --  113*   A1C  --   --   --   --   --  4.8    < > = values in this interval not displayed.     COAGS  Recent Labs   Lab 08/02/19  1938   INR 0.94   PTT 33      Urinalysis  Recent Labs   Lab Test 12/11/13  1915 12/10/13  2050 05/15/13  1951   COLOR  --  Straw Dark Yellow   APPEARANCE  --  Clear Slightly Cloudy   URINEGLC  --  70* Negative   URINEBILI  --  Negative Negative   URINEKETONE  --  Negative Negative   SG  --  1.008 1.011   UBLD  --  Small* Moderate*   URINEPH  --  6.5 6.0    PROTEIN  --  100* 300*   NITRITE  --  Negative Negative   LEUKEST  --  Negative Large*   RBCU  --  2 19*   WBCU  --  9* 151*   UTPG 7.42*  --   --      Virology:  CMV IgG Antibody   Date Value Ref Range Status   12/26/2013 >10.00  Positive for anti-CMV IgG U/mL Final     EBV VCA IgG Antibody   Date Value Ref Range Status   12/26/2013 57.80 U/mL Final     Comment:     Positive, suggests immunologic exposure.     Hepatitis C Antibody   Date Value Ref Range Status   08/02/2019 Nonreactive NR^Nonreactive Final     Comment:     Assay performance characteristics have not been established for newborns,   infants, and children       Hep B Surface Noemy   Date Value Ref Range Status   12/12/2013 34.4  Final     Comment:     Reactive, Patient is considered to be immune to infection with hepatitis B   when   the value is greater than or equal to 12.0 mlU/mL.

## 2019-08-05 NOTE — PROGRESS NOTES
Final positive donor sputum culture results have been uploaded to [facility].  Donor ID [id number].  Dr. Michel notified of results.

## 2019-08-05 NOTE — CONSULTS
MARY acknowledges consults and will see patient on 8/6/19. When SW walked into patient room she was eating and seemed to not be feeling well due to body language. Patient requested that SW see her later today or tomorrow.     AYDEN Alejo, [address]   Ph: [phone]  Pager: [phone]

## 2019-08-05 NOTE — PLAN OF CARE
BP (!) 140/103   Pulse 90   Temp 97.7  F (36.5  C) (Oral)   Resp 20   Ht 1.524 m (5')   Wt 58.2 kg (128 lb 6.4 oz)   SpO2 95%   BMI 25.08 kg/m      Shift: 0700 - 1500  VS: HR tachy 's. OVSS on RA    Neuro: A&O  Mobility: Ax1 with walker  Pain/Nausea/Vomiting: c/o bladder spasms and gas pain. Received simethicone x1, oxybutynin x1 and 2mg PO dilaudid x1. Received 4mg IV zofran x1 with relief. No emesis reported.   Endocrine: 's  Diet: Regular, fair appetite  LDAs: R 3L internal jugular with D5 1/2NS @ 50ml/hr and TKO line. Dressing changed this shift - pt allergic to chlorhexidine, site cleaned with iodine and no biopatch applied d/t allergy. PIV x2 saline locked. L fistula +bruit and thrill. R EARL with scant amounts serosang op. Dressing changed  Skin: RLQ incision dermabonded, EMILY. No erythema.   GI/: Ramirez with low UOP, 40ml red urine this shift. On 80mg IV lasix TID. No BM  Tests/Procedures: None  Labs: Creat 4.24 after dialysis last night. Other labs WNL  Education: Transplant meds reviewed.   Plan: Plan for IV solumed, IV cellcept and IV thymo tonight at midnight. Will continue to monitor and notify of any changes.

## 2019-08-06 ENCOUNTER — ALLIED HEALTH/NURSE VISIT (OUTPATIENT)
Dept: RESEARCH | Facility: CLINIC | Age: 29
End: 2019-08-06

## 2019-08-06 ENCOUNTER — HOME CARE/HOSPICE - HEALTHEAST (OUTPATIENT)
Dept: HOME HEALTH SERVICES | Facility: HOME HEALTH | Age: 29
End: 2019-08-06

## 2019-08-06 DIAGNOSIS — Z00.6 EXAMINATION OF PARTICIPANT IN CLINICAL TRIAL: Primary | ICD-10-CM

## 2019-08-06 LAB
ABO + RH BLD: NORMAL
ABO + RH BLD: NORMAL
ANION GAP SERPL CALCULATED.3IONS-SCNC: 11 MMOL/L (ref 3–14)
BASOPHILS # BLD AUTO: 0 10E9/L (ref 0–0.2)
BASOPHILS NFR BLD AUTO: 0 %
BLD GP AB SCN SERPL QL: NORMAL
BLD PROD TYP BPU: NORMAL
BLD UNIT ID BPU: 0
BLOOD BANK CMNT PATIENT-IMP: NORMAL
BLOOD PRODUCT CODE: NORMAL
BPU ID: NORMAL
BUN SERPL-MCNC: 45 MG/DL (ref 7–30)
CALCIUM SERPL-MCNC: 9.2 MG/DL (ref 8.5–10.1)
CHLORIDE SERPL-SCNC: 99 MMOL/L (ref 94–109)
CO2 SERPL-SCNC: 23 MMOL/L (ref 20–32)
CREAT SERPL-MCNC: 7.35 MG/DL (ref 0.52–1.04)
DIFFERENTIAL METHOD BLD: ABNORMAL
DONOR IDENTIFICATION: NORMAL
DSA COMMENTS: NORMAL
DSA PRESENT: NO
DSA TEST METHOD: NORMAL
EOSINOPHIL # BLD AUTO: 0 10E9/L (ref 0–0.7)
EOSINOPHIL NFR BLD AUTO: 0 %
ERYTHROCYTE [DISTWIDTH] IN BLOOD BY AUTOMATED COUNT: 13.1 % (ref 10–15)
GFR SERPL CREATININE-BSD FRML MDRD: 7 ML/MIN/{1.73_M2}
GLUCOSE BLDC GLUCOMTR-MCNC: 112 MG/DL (ref 70–99)
GLUCOSE BLDC GLUCOMTR-MCNC: 116 MG/DL (ref 70–99)
GLUCOSE BLDC GLUCOMTR-MCNC: 132 MG/DL (ref 70–99)
GLUCOSE SERPL-MCNC: 85 MG/DL (ref 70–99)
HCT VFR BLD AUTO: 21.8 % (ref 35–47)
HGB BLD-MCNC: 6.9 G/DL (ref 11.7–15.7)
HGB BLD-MCNC: 8.4 G/DL (ref 11.7–15.7)
IMM GRANULOCYTES # BLD: 0 10E9/L (ref 0–0.4)
IMM GRANULOCYTES NFR BLD: 0.5 %
INTERPRETATION ECG - MUSE: NORMAL
LYMPHOCYTES # BLD AUTO: 0.1 10E9/L (ref 0.8–5.3)
LYMPHOCYTES NFR BLD AUTO: 1.4 %
MAGNESIUM SERPL-MCNC: 2.1 MG/DL (ref 1.6–2.3)
MCH RBC QN AUTO: 31.7 PG (ref 26.5–33)
MCHC RBC AUTO-ENTMCNC: 31.7 G/DL (ref 31.5–36.5)
MCV RBC AUTO: 100 FL (ref 78–100)
MONOCYTES # BLD AUTO: 0.5 10E9/L (ref 0–1.3)
MONOCYTES NFR BLD AUTO: 7.1 %
NEUTROPHILS # BLD AUTO: 5.8 10E9/L (ref 1.6–8.3)
NEUTROPHILS NFR BLD AUTO: 91 %
NRBC # BLD AUTO: 0 10*3/UL
NRBC BLD AUTO-RTO: 0 /100
NUM BPU REQUESTED: 1
ORGAN: NORMAL
PHOSPHATE SERPL-MCNC: 5.8 MG/DL (ref 2.5–4.5)
PLATELET # BLD AUTO: 79 10E9/L (ref 150–450)
POTASSIUM SERPL-SCNC: 4.2 MMOL/L (ref 3.4–5.3)
POTASSIUM SERPL-SCNC: 4.3 MMOL/L (ref 3.4–5.3)
RBC # BLD AUTO: 2.18 10E12/L (ref 3.8–5.2)
SODIUM SERPL-SCNC: 133 MMOL/L (ref 133–144)
SPECIMEN EXP DATE BLD: NORMAL
TRANSFUSION STATUS PATIENT QL: NORMAL
WBC # BLD AUTO: 6.3 10E9/L (ref 4–11)

## 2019-08-06 PROCEDURE — 25000132 ZZH RX MED GY IP 250 OP 250 PS 637: Mod: GY | Performed by: STUDENT IN AN ORGANIZED HEALTH CARE EDUCATION/TRAINING PROGRAM

## 2019-08-06 PROCEDURE — 80048 BASIC METABOLIC PNL TOTAL CA: CPT | Performed by: STUDENT IN AN ORGANIZED HEALTH CARE EDUCATION/TRAINING PROGRAM

## 2019-08-06 PROCEDURE — 25000131 ZZH RX MED GY IP 250 OP 636 PS 637: Mod: GY | Performed by: NURSE PRACTITIONER

## 2019-08-06 PROCEDURE — 84132 ASSAY OF SERUM POTASSIUM: CPT | Performed by: STUDENT IN AN ORGANIZED HEALTH CARE EDUCATION/TRAINING PROGRAM

## 2019-08-06 PROCEDURE — 86901 BLOOD TYPING SEROLOGIC RH(D): CPT | Performed by: TRANSPLANT SURGERY

## 2019-08-06 PROCEDURE — 86923 COMPATIBILITY TEST ELECTRIC: CPT | Performed by: TRANSPLANT SURGERY

## 2019-08-06 PROCEDURE — 25000132 ZZH RX MED GY IP 250 OP 250 PS 637: Mod: GY | Performed by: PHYSICIAN ASSISTANT

## 2019-08-06 PROCEDURE — P9016 RBC LEUKOCYTES REDUCED: HCPCS | Performed by: TRANSPLANT SURGERY

## 2019-08-06 PROCEDURE — 25000131 ZZH RX MED GY IP 250 OP 636 PS 637: Mod: GY | Performed by: SURGERY

## 2019-08-06 PROCEDURE — 36592 COLLECT BLOOD FROM PICC: CPT | Performed by: STUDENT IN AN ORGANIZED HEALTH CARE EDUCATION/TRAINING PROGRAM

## 2019-08-06 PROCEDURE — 25000128 H RX IP 250 OP 636: Performed by: PHYSICIAN ASSISTANT

## 2019-08-06 PROCEDURE — 86900 BLOOD TYPING SEROLOGIC ABO: CPT | Performed by: TRANSPLANT SURGERY

## 2019-08-06 PROCEDURE — 85025 COMPLETE CBC W/AUTO DIFF WBC: CPT | Performed by: STUDENT IN AN ORGANIZED HEALTH CARE EDUCATION/TRAINING PROGRAM

## 2019-08-06 PROCEDURE — 86850 RBC ANTIBODY SCREEN: CPT | Performed by: TRANSPLANT SURGERY

## 2019-08-06 PROCEDURE — 85018 HEMOGLOBIN: CPT | Performed by: TRANSPLANT SURGERY

## 2019-08-06 PROCEDURE — 83735 ASSAY OF MAGNESIUM: CPT | Performed by: STUDENT IN AN ORGANIZED HEALTH CARE EDUCATION/TRAINING PROGRAM

## 2019-08-06 PROCEDURE — 25000132 ZZH RX MED GY IP 250 OP 250 PS 637: Mod: GY | Performed by: NURSE PRACTITIONER

## 2019-08-06 PROCEDURE — 00000146 ZZHCL STATISTIC GLUCOSE BY METER IP

## 2019-08-06 PROCEDURE — 12000026 ZZH R&B TRANSPLANT

## 2019-08-06 PROCEDURE — 36592 COLLECT BLOOD FROM PICC: CPT | Performed by: TRANSPLANT SURGERY

## 2019-08-06 PROCEDURE — 25000128 H RX IP 250 OP 636: Performed by: STUDENT IN AN ORGANIZED HEALTH CARE EDUCATION/TRAINING PROGRAM

## 2019-08-06 PROCEDURE — 25800030 ZZH RX IP 258 OP 636: Performed by: PHYSICIAN ASSISTANT

## 2019-08-06 PROCEDURE — 84100 ASSAY OF PHOSPHORUS: CPT | Performed by: STUDENT IN AN ORGANIZED HEALTH CARE EDUCATION/TRAINING PROGRAM

## 2019-08-06 PROCEDURE — 25000132 ZZH RX MED GY IP 250 OP 250 PS 637: Mod: GY | Performed by: SURGERY

## 2019-08-06 RX ORDER — POLYETHYLENE GLYCOL 3350 17 G/17G
17 POWDER, FOR SOLUTION ORAL DAILY
Status: DISCONTINUED | OUTPATIENT
Start: 2019-08-06 | End: 2019-08-08

## 2019-08-06 RX ORDER — BISACODYL 10 MG
10 SUPPOSITORY, RECTAL RECTAL ONCE
Status: COMPLETED | OUTPATIENT
Start: 2019-08-06 | End: 2019-08-06

## 2019-08-06 RX ORDER — DIPHENHYDRAMINE HCL 12.5MG/5ML
25-50 LIQUID (ML) ORAL ONCE
Status: COMPLETED | OUTPATIENT
Start: 2019-08-06 | End: 2019-08-06

## 2019-08-06 RX ORDER — DIPHENHYDRAMINE HCL 25 MG
25-50 CAPSULE ORAL ONCE
Status: COMPLETED | OUTPATIENT
Start: 2019-08-06 | End: 2019-08-06

## 2019-08-06 RX ORDER — METHYLPREDNISOLONE SODIUM SUCCINATE 125 MG/2ML
100 INJECTION, POWDER, LYOPHILIZED, FOR SOLUTION INTRAMUSCULAR; INTRAVENOUS ONCE
Status: COMPLETED | OUTPATIENT
Start: 2019-08-06 | End: 2019-08-06

## 2019-08-06 RX ORDER — ACETAMINOPHEN 325 MG/1
650 TABLET ORAL ONCE
Status: DISCONTINUED | OUTPATIENT
Start: 2019-08-06 | End: 2019-08-06

## 2019-08-06 RX ORDER — BISACODYL 10 MG
10 SUPPOSITORY, RECTAL RECTAL DAILY PRN
Status: DISCONTINUED | OUTPATIENT
Start: 2019-08-06 | End: 2019-08-12 | Stop reason: HOSPADM

## 2019-08-06 RX ADMIN — MAGNESIUM OXIDE TAB 400 MG (241.3 MG ELEMENTAL MG) 400 MG: 400 (241.3 MG) TAB at 11:31

## 2019-08-06 RX ADMIN — DIPHENHYDRAMINE HYDROCHLORIDE 25 MG: 25 CAPSULE ORAL at 09:54

## 2019-08-06 RX ADMIN — MYCOPHENOLATE MOFETIL 750 MG: 250 CAPSULE ORAL at 17:57

## 2019-08-06 RX ADMIN — SENNOSIDES AND DOCUSATE SODIUM 1 TABLET: 8.6; 5 TABLET ORAL at 19:56

## 2019-08-06 RX ADMIN — TACROLIMUS 2 MG: 1 CAPSULE ORAL at 17:57

## 2019-08-06 RX ADMIN — POLYETHYLENE GLYCOL 3350 17 G: 17 POWDER, FOR SOLUTION ORAL at 20:04

## 2019-08-06 RX ADMIN — ONDANSETRON 4 MG: 2 INJECTION INTRAMUSCULAR; INTRAVENOUS at 20:11

## 2019-08-06 RX ADMIN — ACETAMINOPHEN 650 MG: 325 TABLET, FILM COATED ORAL at 09:54

## 2019-08-06 RX ADMIN — CINACALCET HYDROCHLORIDE 30 MG: 30 TABLET, COATED ORAL at 16:40

## 2019-08-06 RX ADMIN — ACETAMINOPHEN 650 MG: 325 TABLET, FILM COATED ORAL at 16:56

## 2019-08-06 RX ADMIN — ATORVASTATIN CALCIUM 10 MG: 10 TABLET, FILM COATED ORAL at 07:54

## 2019-08-06 RX ADMIN — FUROSEMIDE 80 MG: 10 INJECTION, SOLUTION INTRAMUSCULAR; INTRAVENOUS at 07:56

## 2019-08-06 RX ADMIN — ACETAMINOPHEN 650 MG: 325 TABLET, FILM COATED ORAL at 13:49

## 2019-08-06 RX ADMIN — BISACODYL 10 MG: 10 SUPPOSITORY RECTAL at 13:51

## 2019-08-06 RX ADMIN — FUROSEMIDE 80 MG: 10 INJECTION, SOLUTION INTRAMUSCULAR; INTRAVENOUS at 13:50

## 2019-08-06 RX ADMIN — LEVOTHYROXINE SODIUM 25 MCG: 25 TABLET ORAL at 07:54

## 2019-08-06 RX ADMIN — ANTI-THYMOCYTE GLOBULIN (RABBIT) 125 MG: 5 INJECTION, POWDER, LYOPHILIZED, FOR SOLUTION INTRAVENOUS at 10:25

## 2019-08-06 RX ADMIN — SIMETHICONE CHEW TAB 80 MG 80 MG: 80 TABLET ORAL at 09:57

## 2019-08-06 RX ADMIN — AMLODIPINE BESYLATE 10 MG: 10 TABLET ORAL at 07:53

## 2019-08-06 RX ADMIN — SENNOSIDES AND DOCUSATE SODIUM 1 TABLET: 8.6; 5 TABLET ORAL at 07:53

## 2019-08-06 RX ADMIN — ASPIRIN 81 MG CHEWABLE TABLET 81 MG: 81 TABLET CHEWABLE at 07:54

## 2019-08-06 RX ADMIN — METHYLPREDNISOLONE SODIUM SUCCINATE 100 MG: 125 INJECTION, POWDER, LYOPHILIZED, FOR SOLUTION INTRAMUSCULAR; INTRAVENOUS at 09:54

## 2019-08-06 RX ADMIN — FUROSEMIDE 80 MG: 10 INJECTION, SOLUTION INTRAMUSCULAR; INTRAVENOUS at 19:59

## 2019-08-06 RX ADMIN — HYDROMORPHONE HYDROCHLORIDE 2 MG: 2 TABLET ORAL at 19:55

## 2019-08-06 RX ADMIN — TACROLIMUS 2 MG: 1 CAPSULE ORAL at 07:53

## 2019-08-06 RX ADMIN — HYDROMORPHONE HYDROCHLORIDE 2 MG: 2 TABLET ORAL at 19:11

## 2019-08-06 RX ADMIN — PROCHLORPERAZINE EDISYLATE 5 MG: 5 INJECTION INTRAMUSCULAR; INTRAVENOUS at 20:46

## 2019-08-06 RX ADMIN — ACETAMINOPHEN 650 MG: 325 TABLET, FILM COATED ORAL at 20:04

## 2019-08-06 RX ADMIN — ACETAMINOPHEN 650 MG: 325 TABLET, FILM COATED ORAL at 04:12

## 2019-08-06 RX ADMIN — MYCOPHENOLATE MOFETIL 750 MG: 250 CAPSULE ORAL at 07:53

## 2019-08-06 RX ADMIN — ONDANSETRON 4 MG: 2 INJECTION INTRAMUSCULAR; INTRAVENOUS at 06:41

## 2019-08-06 RX ADMIN — HYDROMORPHONE HYDROCHLORIDE 2 MG: 2 TABLET ORAL at 07:52

## 2019-08-06 RX ADMIN — ONDANSETRON 4 MG: 2 INJECTION INTRAMUSCULAR; INTRAVENOUS at 14:08

## 2019-08-06 RX ADMIN — Medication 1 CAPSULE: at 07:53

## 2019-08-06 RX ADMIN — SEVELAMER CARBONATE 2400 MG: 800 TABLET, FILM COATED ORAL at 07:53

## 2019-08-06 RX ADMIN — SIMETHICONE CHEW TAB 80 MG 80 MG: 80 TABLET ORAL at 16:56

## 2019-08-06 RX ADMIN — SIMETHICONE CHEW TAB 80 MG 80 MG: 80 TABLET ORAL at 00:10

## 2019-08-06 ASSESSMENT — ACTIVITIES OF DAILY LIVING (ADL)
ADLS_ACUITY_SCORE: 13

## 2019-08-06 ASSESSMENT — MIFFLIN-ST. JEOR: SCORE: 1255.71

## 2019-08-06 NOTE — PROGRESS NOTES
Transplant Admission Psychosocial Assessment    Patient Name: Mona Wagner  : 1990  Age: 28 year old  MRN: [id number]  Completed assessment with: Patient    Patient underwent a  donor kidney transplant.  Met with patient to update psychosocial assessment and provide brief education about SW role while inpatient, as well as expectations/requirements and follow up needs post-transplant. SW also provided education about need for compliance with transplant medications, and explained ESRD Medicare benefits and medication coverage under Medicare part B.  Medicare 2728 forms completed and signed by patient.  Presenting Information   Living Situation: Patient lives with her  Jt and his family  If not local, plans for short term stay:  N/A, patient lives in [place], [place]  Previous Functional Status: Patient was working part time and independent in all ADL's  Cultural/Language/Spiritual Considerations: None indicated by patient    Support System  Primary Support Person  Jt  Other support:  Jt's family  Plan for support in immediate post-transplant period: Patient will receive support from her  and his family    Health Care Directive  Decision Maker: Patient  Alternate Decision Maker: Legally patient's parents are her NOK, patient would like her  to be her HCA  Health Care Directive: Provided Copy and Provided education    Mental Health/Coping:   History of Mental Health: None indicated, patient has been experiencing anxiety after transplant  History of Chemical Health: None indicated  Current status: Appropriate and oriented  Coping: Patient reports she has had some anxiety and nausea post transplant but those have been improving. Overall, patient feels she is doing well  Services Needed/Recommended: SW explained anxiety and depression can be common after a transplant. SW advised she reach out to outpatient SW if these symptoms do not go away to get further resources. Patient  feels she is managing well on her own at this time    Financial   Income: SSDI, Jt's salary/wages  Impact of transplant on income: Patient will not be able to work part-time until she is feeling better. SW also discussed SSDI will most likely stop one year after transplant as her only qualifying diagnosis was ESRD  Insurance and medication coverage: Medicare and Medicaid  Financial concerns: Patient concerned about her SSDI ending. Patient is unsure if she wants to work full time as the benefits at the pharmacy have high co-pays and she wants to keep her Medicaid insurance  Resources needed: SW advised patient reach out to outpatient SW if she had further questions about SSDI    Assessment, recommendations and discharge plan: Patient is a 28-year-old female who underwent a  donor kidney transplant on 8/3/2019. Patient was on dialysis for 5.5 years prior to transplant. Patient was lying in bed and willing to talk with . Patient has adequate insurance coverage and social support. No coping issues or psychosocial concerns. Patient was well informed of post-transplant expectations/follow through.    AYDEN Alejo, BRYANNA  7A   Ph: [phone]  Pager: [phone]

## 2019-08-06 NOTE — PROGRESS NOTES
[facility], [place]   Transplant Nephrology Progress Note  Date of Admission:  2019  Today's Date: 2019     Assessment & Plan    # DDKT: baseline Cr ~ TBD; DCD complicated by DGF              - Proteinuria: Not checked post transplant              - Date DSA Last Checked:Pending   -Patient is overloaded, consider hemodialysis to remove 2 kg. Please reach out to the nephrologist on call if dialysis is needed after hours.     # Immunosuppression: usual standard induction    Tacrolimus immediate release (goal 8-10) and Mycophenolate mofetil (goal 1-3.5)              - Changes: No     # Prophylaxis:              - PJP: Dapsone              - CMV: Valcyte              - Thrush: None     # Hypertension: Controlled; Goal BP: < 140/90              - Volume status:  hypervolemic              - Changes: No     # Anemia in Chronic Renal Disease: Hgb: Stable      CHARLINE: No              - Iron studies: Not checked recently     # Mineral Bone Disorder:   - Secondary renal hyperparathyroidism; PTH level: Not checked recently  - Vitamin D; level: Not checked recently     # Electrolytes:   - Potassium; level: Hyperkalemia resolved   - Magnesium; level: Normal     # Transplant History:  Etiology of kidney failure: IgA nephropathy  Tx: DDKT  Transplant: 8/3/2019 (Kidney)  Donor Type:  - Cardiac Death        Donor Class: Standard Criteria Donor  Crossmatch at time of Tx: negative  Significant changes in immunosuppression: None  Significant transplant-related complications: None     Recommendations were communicated to the primary team via this note  Dean Wilson MD  Pager: [phone]    Interval History     Stable overnight no new complaints today other than feeling slightly volume overloaded.  No nausea or vomiting.  No chest pains or shortness of breath.  She feels heavy when she walks around and feels that this is slowing her down.    Review of Systems   4 point ROS was obtained and  negative except as noted in the Interval History.    Physical Exam   Temp  Av.3  F (36.8  C)  Min: 97.6  F (36.4  C)  Max: 98.9  F (37.2  C)      Pulse  Av.1  Min: 67  Max: 129 Resp  Av.1  Min: 10  Max: 22  SpO2  Av.7 %  Min: 91 %  Max: 100 %    CVP (mmHg): 7 mmHgBP (!) 139/92 (BP Location: Right arm)   Pulse 90   Temp 98.7  F (37.1  C) (Oral)   Resp 18   Ht 1.524 m (5')   Wt 59.4 kg (131 lb)   SpO2 95%   BMI 25.58 kg/m     Date 19 07 - 19 0659   Shift 3528-5370 2070-9619 6108-4837 24 Hour Total   INTAKE   I.V. 30   30   Shift Total(mL/kg) 30(0.56)   30(0.56)   OUTPUT   Shift Total(mL/kg)       Weight (kg) 54 54 54 54      Admit Weight: 54 kg (119 lb 0.8 oz)   GENERAL APPEARANCE: alert and no distress  HENT: mouth without ulcers or lesions  LYMPHATICS: no cervical or supraclavicular nodes  RESP: lungs clear to auscultation - no rales, rhonchi or wheezes  CV: regular rhythm, normal rate, no rub, no murmur  EDEMA: 1+ LE edema bilaterally  ABDOMEN: soft, nondistended, nontender, bowel sounds normal  MS: extremities normal - no gross deformities noted, no evidence of inflammation in joints, no muscle tenderness  SKIN: no rash    Data   All labs reviewed by me.  CMP  Recent Labs   Lab 19  0545 19  0058 19  2047 19  1727  19  0532  19  1900  19  0429  19  1240  19  1938     --   --   --   --  132*  --  131*  --  132*  --  132*   < > 134   POTASSIUM 4.2 4.3 4.3 4.2   < > 4.6   < > 4.5   < > 4.4   < > 5.0   < > 3.6   CHLORIDE 99  --   --   --   --  100  --  99  --  99  --  100  --  91*   CO2 23  --   --   --   --  27  --  24  --  24  --  24  --  35*   ANIONGAP 11  --   --   --   --  6  --  8  --  9  --  7  --  8   GLC 85  --   --   --   --  126*  --  120*  --  116*  --  125*   < > 82   BUN 45*  --   --   --   --  17  --  36*  --  24  --  34*  --  22   CR 7.35*  --   --   --   --  4.24*  --  7.58*  --  5.90*  --  7.71*  --   6.11*   GFRESTIMATED 7*  --   --   --   --  13*  --  7*  --  9*  --  7*  --  9*   GFRESTBLACK 8*  --   --   --   --  16*  --  8*  --  10*  --  8*  --  10*   SHAMIR 9.2  --   --   --   --  9.2  --  9.0  --  9.1  --  8.3*  --  9.8   MAG 2.1  --   --   --   --  1.7  --   --   --  1.6  --  1.3*  --   --    PHOS 5.8*  --   --   --   --  4.2  --   --   --  6.4*  --  4.7*  --   --    PROTTOTAL  --   --   --   --   --   --   --   --   --   --   --   --   --  8.6   ALBUMIN  --   --   --   --   --   --   --   --   --   --   --   --   --  4.2   BILITOTAL  --   --   --   --   --   --   --   --   --   --   --   --   --  0.8   ALKPHOS  --   --   --   --   --   --   --   --   --   --   --   --   --  65   AST  --   --   --   --   --   --   --   --   --   --   --   --   --  4   ALT  --   --   --   --   --   --   --   --   --   --   --   --   --  13    < > = values in this interval not displayed.     CBC  Recent Labs   Lab 08/06/19  0545 08/05/19  0532 08/04/19  1158 08/04/19  0748 08/04/19  0429  08/03/19  1240   HGB 6.9* 7.7* 7.6* 7.6* 7.6*   < > 8.5*   WBC 6.3 5.1  --   --  10.9  --  9.9   RBC 2.18* 2.45*  --   --  2.39*  --  2.64*   HCT 21.8* 25.0*  --   --  24.2*  --  26.5*    102*  --   --  101*  --  100   MCH 31.7 31.4  --   --  31.8  --  32.2   MCHC 31.7 30.8*  --   --  31.4*  --  32.1   RDW 13.1 12.8  --   --  13.2  --  13.1   PLT 79* 75*  --   --  118*  --  113*    < > = values in this interval not displayed.     INR  Recent Labs   Lab 08/02/19  1938   INR 0.94   PTT 33     ABG  Recent Labs   Lab 08/03/19  1047   O2PER 40      Urine Studies  Recent Labs   Lab Test 12/10/13  2050 05/15/13  1951   COLOR Straw Dark Yellow   APPEARANCE Clear Slightly Cloudy   URINEGLC 70* Negative   URINEBILI Negative Negative   URINEKETONE Negative Negative   SG 1.008 1.011   UBLD Small* Moderate*   URINEPH 6.5 6.0   PROTEIN 100* 300*   NITRITE Negative Negative   LEUKEST Negative Large*   RBCU 2 19*   WBCU 9* 151*     Recent Labs   Lab  Test 12/11/13  1915   UTPG 7.42*     PTH  Recent Labs   Lab Test 12/11/13  0601   PTHI 1,131*     Iron Studies  Recent Labs   Lab Test 12/29/16  1338 12/11/13  0600   IRON 137 65   * 245   IRONSAT 60* 26   MONET 1,039*  --        IMAGING:  All imaging studies reviewed by me.     MEDICATIONS:  Current Facility-Administered Medications   Medication     acetaminophen (TYLENOL) tablet 650 mg     amLODIPine (NORVASC) tablet 10 mg     aspirin (ASA) chewable tablet 81 mg     atorvastatin (LIPITOR) tablet 10 mg     benzocaine 20% (HURRICAINE/TOPEX) 20 % spray 0.5-1 mL     bisacodyl (DULCOLAX) Suppository 10 mg     bisacodyl (DULCOLAX) Suppository 10 mg     cinacalcet (SENSIPAR) tablet 30 mg     [Rx hold ] dapsone (ACZONE) tablet 100 mg     dextrose 10 % 1,000 mL infusion     glucose gel 15-30 g    Or     dextrose 50 % injection 25-50 mL    Or     glucagon injection 1 mg     furosemide (LASIX) injection 80 mg     hydrALAZINE (APRESOLINE) injection 10 mg     HYDROmorphone (DILAUDID) tablet 2-4 mg     levothyroxine (SYNTHROID/LEVOTHROID) half-tab 37.5 mcg     levothyroxine (SYNTHROID/LEVOTHROID) tablet 25 mcg     lidocaine (LMX4) cream     lidocaine 1 % 0.1-1 mL     magnesium oxide (MAG-OX) tablet 400 mg     multivitamin RENAL (NEPHROCAPS/TRIPHROCAPS) capsule 1 capsule     mycophenolate (GENERIC EQUIVALENT) capsule 750 mg     naloxone (NARCAN) injection 0.1-0.4 mg     ondansetron (ZOFRAN) injection 4 mg     opium-belladonna (B&O SUPPRETTES) 30-16.2 MG per suppository 0.5 suppository     oxybutynin (DITROPAN) tablet 5 mg     prochlorperazine (COMPAZINE) injection 5 mg     senna-docusate (SENOKOT-S/PERICOLACE) 8.6-50 MG per tablet 1 tablet     sevelamer (RENVELA) tablet 2,400 mg     simethicone (MYLICON) chewable tablet 80 mg     sodium chloride (PF) 0.9% PF flush 10 mL     sodium chloride (PF) 0.9% PF flush 10-20 mL     sodium chloride 0.45% infusion     sodium chloride 0.9% infusion     tacrolimus (GENERIC EQUIVALENT)  capsule 2 mg     valGANciclovir (VALCYTE) tablet 450 mg

## 2019-08-06 NOTE — PROGRESS NOTES
Transplant Surgery  Inpatient Daily Progress Note  08/06/2019    Assessment & Plan: 29yo woman with history of ESKD 2/2 IgA nephropathy on HD since 12/2013 via LUE AVF MWF s/p DDKT 8/3/19. Other PMH includes SAH in 2014 while on Coumadin and subsequent seizure x2, hypothyroidism, chronic ITP, hyperprolactinemia 2/2 to pituitary adenoma.      Graft function:  Kidney: DDKT 8/3/19. Low UOP, slightly increased from yesterday. Creat back up after UF yesterday, 7.35 (4.24). No acute indications for dialysis on labs today, will hold off to assess for renal recovery, pt concerned about fluid status (overload), will continue to follow/assess for dialysis daily.  Renal US in PACU normal, peak velocity 137.  POD0 K 6.4, improved s/p dialysis.  POD1 dialysis for hypervolemia, hypertension, tachypneic  Pain moderately controlled on Tylenol and PO Dilaudid (d/c'd PO oxy and IV Dil 2/2 nausea)     Immunosuppression management:   Thymoglobulin 100mg intra-op (not documented, however, absolute lymphocyte 0.0)/100mg (received in early am 8/5). Will complete today, 125 mg, for a total of 325 mg or 6.02 mg/kg.  Solu-Medrol 500mg intra-op (also not documented, see above)/250mg/100 mg today.  Tacrolimus 2mg BID (increased 8/5 from 1.5)  Cellcept 750mg BID     Hematology:   HGB down to 6.9 today, will transfuse     Cardiorespiratory: POD1 Difficulty breathing, chest fullness, leg tightness in evening. BNP elevated, weight up otherwise labs, troponin, EKG, BLE US negative. Improved with dialysis.  Aspirin 81 (graft with 3 arteries)  Atorvastatin 10mg  Amlodipine 10mg  Hydralazine PRN     GI/Nutrition:   Patient with nausea, no vomiting, has not been taking PO. Zofran PRN. ADAT.   Zofran PRN  Simethicone     Endocrine:   Blood sugars 120s  Levothyroxine 37.5 mcg     Fluid/Electrolytes: K 6.4 post-op, required emergent dialysis. K now stabilized around 4.3.      :   Ramirez in place, hematuria- will remove today  IV Lasix 80mg TID  B&O  suppository PRN, Ditropan PRN     Infectious disease:   Holding Dapsone pending G6PD result  Valgancyclovir starting 8/5     Prophylaxis:   IS, SCDs     Disposition: 7A      Medical Decision Making: Medium  Subsequent visit 24185 (moderate level decision making)    Resident: Corinna Toledo PA-C    Faculty: Dr. Michel  _________________________________________________________________  Transplant History: Admitted 8/2/2019 for kidney transplant.  8/3/2019 (Kidney), Postoperative day: 3     Interval History:  Patient feeling improved from yesterday. Still with nausea though has improved, no vomiting. No chest pain or shortness of breath. Reports feeling like having bladder spasms. Feeling full and puffy in her legs which she feels like makes it more difficult for her to walk.    ROS:   A 10-point review of systems was negative except as noted above.    Meds:    acetaminophen  650 mg Oral Q4H     amLODIPine  10 mg Oral Daily     aspirin  81 mg Oral Daily     atorvastatin  10 mg Oral Daily     cinacalcet  30 mg Oral Daily with supper     furosemide  80 mg Intravenous TID     levothyroxine  37.5 mcg Oral Q Mon Wed Fri AM     levothyroxine  25 mcg Oral Once per day on Sun Tue Thu Sat     magnesium oxide  400 mg Oral Daily with lunch     multivitamin RENAL  1 capsule Oral Daily     mycophenolate  750 mg Oral BID IS     senna-docusate  1 tablet Oral BID     sevelamer  2,400 mg Oral TID w/meals     sodium chloride (PF)  10 mL Intracatheter Q8H     tacrolimus  2 mg Oral BID IS     valGANciclovir  450 mg Oral Once per day on Mon Thu       Physical Exam:     Admit Weight: 54 kg (119 lb 0.8 oz)  Today's weight: 131 lbs 0 oz    Vital sign ranges:    Temp:  [97.6  F (36.4  C)-100  F (37.8  C)] 98.1  F (36.7  C)  Heart Rate:  [] 96  Resp:  [16-20] 18  BP: (136-145)/() 140/97  SpO2:  [92 %-99 %] 96 %    GENERAL: Sitting up in a chair  SKIN: No jaundice. No rashes or lesions.   HEENT: Eyes symmetrical and free of  discharge bilaterally.  CV: well perfused  RESPIRATORY: Respirations regular, even, and unlabored  GI: Soft and minimally distended. Mild tenderness to palpation, right side. Incision with dermabond, C/D/I, EARL drain with serosanguinous output.  : Ramirez, hematuria.  EXTREMITIES: Mild peripheral edema. L AVF   NEUROLOGIC: Alert and orientated x 3. No focal deficits.   MUSCULOSKELETAL: No joint swelling or tenderness.      Data:   CMP  Recent Labs   Lab 08/06/19  0545 08/06/19  0058  08/05/19 0532 08/03/19  1047 08/02/19  1938     --   --  132*   < > 131* 134   POTASSIUM 4.2 4.3   < > 4.6   < > 4.1 3.6   CHLORIDE 99  --   --  100   < >  --  91*   CO2 23  --   --  27   < >  --  35*   GLC 85  --   --  126*   < > 111* 82   BUN 45*  --   --  17   < >  --  22   CR 7.35*  --   --  4.24*   < >  --  6.11*   GFRESTIMATED 7*  --   --  13*   < >  --  9*   GFRESTBLACK 8*  --   --  16*   < >  --  10*   SHAMIR 9.2  --   --  9.2   < >  --  9.8   ICAW  --   --   --   --   --  5.0  --    MAG 2.1  --   --  1.7   < >  --   --    PHOS 5.8*  --   --  4.2   < >  --   --    ALBUMIN  --   --   --   --   --   --  4.2   BILITOTAL  --   --   --   --   --   --  0.8   ALKPHOS  --   --   --   --   --   --  65   AST  --   --   --   --   --   --  4   ALT  --   --   --   --   --   --  13    < > = values in this interval not displayed.     CBC  Recent Labs   Lab 08/06/19  0545 08/05/19  0532 08/03/19  1240   HGB 6.9* 7.7*   < > 8.5*   WBC 6.3 5.1   < > 9.9   PLT 79* 75*   < > 113*   A1C  --   --   --  4.8    < > = values in this interval not displayed.     COAGS  Recent Labs   Lab 08/02/19  1938   INR 0.94   PTT 33      Urinalysis  Recent Labs   Lab Test 12/11/13  1915 12/10/13  2050 05/15/13  1951   COLOR  --  Straw Dark Yellow   APPEARANCE  --  Clear Slightly Cloudy   URINEGLC  --  70* Negative   URINEBILI  --  Negative Negative   URINEKETONE  --  Negative Negative   SG  --  1.008 1.011   UBLD  --  Small* Moderate*   URINEPH  --  6.5 6.0    PROTEIN  --  100* 300*   NITRITE  --  Negative Negative   LEUKEST  --  Negative Large*   RBCU  --  2 19*   WBCU  --  9* 151*   UTPG 7.42*  --   --      Virology:  CMV IgG Antibody   Date Value Ref Range Status   12/26/2013 >10.00  Positive for anti-CMV IgG U/mL Final     EBV VCA IgG Antibody   Date Value Ref Range Status   12/26/2013 57.80 U/mL Final     Comment:     Positive, suggests immunologic exposure.     Hepatitis C Antibody   Date Value Ref Range Status   08/02/2019 Nonreactive NR^Nonreactive Final     Comment:     Assay performance characteristics have not been established for newborns,   infants, and children       Hep B Surface Noemy   Date Value Ref Range Status   12/12/2013 34.4  Final     Comment:     Reactive, Patient is considered to be immune to infection with hepatitis B   when   the value is greater than or equal to 12.0 mlU/mL.

## 2019-08-06 NOTE — PROGRESS NOTES
[facility], [place]   Transplant Nephrology Progress Note  Date of Admission:  2019  Today's Date: 2019     Assessment & Plan    # DDKT: baseline Cr ~ TBD; DCD complicated by DGF              - Proteinuria: Not checked post transplant              - Date DSA Last Checked:Pending      # Immunosuppression: usual standard induction    Tacrolimus immediate release (goal 8-10) and Mycophenolate mofetil (goal 1-3.5)              - Changes: No     # Prophylaxis:              - PJP: Dapsone              - CMV: Valcyte              - Thrush: None     # Hypertension: Controlled; Goal BP: < 140/90              - Volume status:  hypervolemic              - Changes: No     # Anemia in Chronic Renal Disease: Hgb: Stable      CHARLINE: No              - Iron studies: Not checked recently     # Mineral Bone Disorder:   - Secondary renal hyperparathyroidism; PTH level: Not checked recently  - Vitamin D; level: Not checked recently     # Electrolytes:   - Potassium; level: Hyperkalemia resolved   - Magnesium; level: Normal     # Transplant History:  Etiology of kidney failure: IgA nephropathy  Tx: DDKT  Transplant: 8/3/2019 (Kidney)  Donor Type:  - Cardiac Death        Donor Class: Standard Criteria Donor  Crossmatch at time of Tx: negative  Significant changes in immunosuppression: None  Significant transplant-related complications: None     Recommendations were communicated to the primary team via this note  Dean Wilson MD  Pager: [phone]    Interval History     Overnight required dialysis with improved volume status. She had completed her thymoglobulin. She is tolerating well. Still volume up but more comfortable today. No chest pain or sob. Moves a little easier.     Review of Systems   4 point ROS was obtained and negative except as noted in the Interval History.    Physical Exam   Temp  Av.3  F (36.8  C)  Min: 97.6  F (36.4  C)  Max: 98.9  F (37.2  C)      Pulse  Av.1   Min: 67  Max: 129 Resp  Av.1  Min: 10  Max: 22  SpO2  Av.7 %  Min: 91 %  Max: 100 %    CVP (mmHg): 7 mmHgBP (!) 137/93   Pulse 90   Temp 99.1  F (37.3  C) (Oral)   Resp 18   Ht 1.524 m (5')   Wt 58.2 kg (128 lb 6.4 oz)   SpO2 92%   BMI 25.08 kg/m     Date 19 07 - 19 0659   Shift 3593-9487 1025-8300 6499-7735 24 Hour Total   INTAKE   I.V. 30   30   Shift Total(mL/kg) 30(0.56)   30(0.56)   OUTPUT   Shift Total(mL/kg)       Weight (kg) 54 54 54 54      Admit Weight: 54 kg (119 lb 0.8 oz)   GENERAL APPEARANCE: alert and no distress  HENT: mouth without ulcers or lesions  LYMPHATICS: no cervical or supraclavicular nodes  RESP: lungs clear to auscultation - no rales, rhonchi or wheezes  CV: regular rhythm, normal rate, no rub, no murmur  EDEMA: trace LE edema bilaterally  ABDOMEN: soft, nondistended, nontender, bowel sounds normal  MS: extremities normal - no gross deformities noted, no evidence of inflammation in joints, no muscle tenderness  SKIN: no rash    Data   All labs reviewed by me.  CMP  Recent Labs   Lab 19  1727 19  1519 19  0937 19  0532  19  1900  19  0429  19  1240  19  1938   NA  --   --   --  132*  --  131*  --  132*  --  132*   < > 134   POTASSIUM 4.2 4.3 4.6 4.6   < > 4.5   < > 4.4   < > 5.0   < > 3.6   CHLORIDE  --   --   --  100  --  99  --  99  --  100  --  91*   CO2  --   --   --  27  --  24  --  24  --  24  --  35*   ANIONGAP  --   --   --  6  --  8  --  9  --  7  --  8   GLC  --   --   --  126*  --  120*  --  116*  --  125*   < > 82   BUN  --   --   --  17  --  36*  --  24  --  34*  --  22   CR  --   --   --  4.24*  --  7.58*  --  5.90*  --  7.71*  --  6.11*   GFRESTIMATED  --   --   --  13*  --  7*  --  9*  --  7*  --  9*   GFRESTBLACK  --   --   --  16*  --  8*  --  10*  --  8*  --  10*   SHAMIR  --   --   --  9.2  --  9.0  --  9.1  --  8.3*  --  9.8   MAG  --   --   --  1.7  --   --   --  1.6  --  1.3*  --   --    PHOS   --   --   --  4.2  --   --   --  6.4*  --  4.7*  --   --    PROTTOTAL  --   --   --   --   --   --   --   --   --   --   --  8.6   ALBUMIN  --   --   --   --   --   --   --   --   --   --   --  4.2   BILITOTAL  --   --   --   --   --   --   --   --   --   --   --  0.8   ALKPHOS  --   --   --   --   --   --   --   --   --   --   --  65   AST  --   --   --   --   --   --   --   --   --   --   --  4   ALT  --   --   --   --   --   --   --   --   --   --   --  13    < > = values in this interval not displayed.     CBC  Recent Labs   Lab 08/05/19  0532 08/04/19  1158 08/04/19  0748 08/04/19  0429  08/03/19  1240  08/02/19 1938   HGB 7.7* 7.6* 7.6* 7.6*   < > 8.5*   < > 11.7   WBC 5.1  --   --  10.9  --  9.9  --  9.1   RBC 2.45*  --   --  2.39*  --  2.64*  --  3.71*   HCT 25.0*  --   --  24.2*  --  26.5*  --  37.0   *  --   --  101*  --  100  --  100   MCH 31.4  --   --  31.8  --  32.2  --  31.5   MCHC 30.8*  --   --  31.4*  --  32.1  --  31.6   RDW 12.8  --   --  13.2  --  13.1  --  12.9   PLT 75*  --   --  118*  --  113*  --  192    < > = values in this interval not displayed.     INR  Recent Labs   Lab 08/02/19 1938   INR 0.94   PTT 33     ABG  Recent Labs   Lab 08/03/19  1047   O2PER 40      Urine Studies  Recent Labs   Lab Test 12/10/13  2050 05/15/13  1951   COLOR Straw Dark Yellow   APPEARANCE Clear Slightly Cloudy   URINEGLC 70* Negative   URINEBILI Negative Negative   URINEKETONE Negative Negative   SG 1.008 1.011   UBLD Small* Moderate*   URINEPH 6.5 6.0   PROTEIN 100* 300*   NITRITE Negative Negative   LEUKEST Negative Large*   RBCU 2 19*   WBCU 9* 151*     Recent Labs   Lab Test 12/11/13  1915   UTPG 7.42*     PTH  Recent Labs   Lab Test 12/11/13  0601   PTHI 1,131*     Iron Studies  Recent Labs   Lab Test 12/29/16  1338 12/11/13  0600   IRON 137 65   * 245   IRONSAT 60* 26   MONET 1,039*  --        IMAGING:  All imaging studies reviewed by me.     MEDICATIONS:  Current Facility-Administered  Medications   Medication     acetaminophen (TYLENOL) tablet 650 mg     amLODIPine (NORVASC) tablet 10 mg     aspirin (ASA) chewable tablet 81 mg     atorvastatin (LIPITOR) tablet 10 mg     benzocaine 20% (HURRICAINE/TOPEX) 20 % spray 0.5-1 mL     cinacalcet (SENSIPAR) tablet 30 mg     [Rx hold ] dapsone (ACZONE) tablet 100 mg     dextrose 10 % 1,000 mL infusion     glucose gel 15-30 g    Or     dextrose 50 % injection 25-50 mL    Or     glucagon injection 1 mg     furosemide (LASIX) injection 80 mg     HOLD MEDICATION     hydrALAZINE (APRESOLINE) injection 10 mg     HYDROmorphone (DILAUDID) tablet 2-4 mg     levothyroxine (SYNTHROID/LEVOTHROID) half-tab 37.5 mcg     [START ON 8/6/2019] levothyroxine (SYNTHROID/LEVOTHROID) tablet 25 mcg     lidocaine (LMX4) cream     lidocaine 1 % 0.1-1 mL     magnesium oxide (MAG-OX) tablet 400 mg     multivitamin RENAL (NEPHROCAPS/TRIPHROCAPS) capsule 1 capsule     mycophenolate (GENERIC EQUIVALENT) capsule 750 mg     naloxone (NARCAN) injection 0.1-0.4 mg     ondansetron (ZOFRAN) injection 4 mg     opium-belladonna (B&O SUPPRETTES) 30-16.2 MG per suppository 0.5 suppository     oxybutynin (DITROPAN) tablet 5 mg     prochlorperazine (COMPAZINE) injection 5 mg     senna-docusate (SENOKOT-S/PERICOLACE) 8.6-50 MG per tablet 1 tablet     sevelamer (RENVELA) tablet 2,400 mg     simethicone (MYLICON) chewable tablet 80 mg     sodium chloride (PF) 0.9% PF flush 10 mL     sodium chloride (PF) 0.9% PF flush 10-20 mL     sodium chloride 0.45% infusion     sodium chloride 0.9% infusion     tacrolimus (GENERIC EQUIVALENT) capsule 2 mg     valGANciclovir (VALCYTE) tablet 450 mg

## 2019-08-06 NOTE — PLAN OF CARE
/87   Pulse 90   Temp 99.3  F (37.4  C) (Oral)   Resp 18   Ht 1.524 m (5')   Wt 59.4 kg (131 lb)   SpO2 94%   BMI 25.58 kg/m      Afebrile; HR 's, other VSS on RA.  Sinus tachycardia.  Hemoglobin 6.8; 1unit PRBC transfused.  Thymoglobin administered.  Creatinine 7.35 (from 4.24).  Potassium stable; 4.2.  Pt c/o abdominal pain relieved with prn dilaudid and scheduled tylenol.  Simethicone provided for gas pain.  Zofran provided for nausea.  Poor appetite on regular diet d/t fullness. Ramirez with 35mL UO; removed at 14:00 and due to void.  No BM today but pt is passing gas; suppository provided with no results.  EARL with scant output.  Incision sealed with liquid bandage; open to air.  Up with Ax1 and walker.  Continue with plan of care and notify provider with changes.

## 2019-08-06 NOTE — PROGRESS NOTES
Surgery Clinical Trials Office Informed Consent Process Documentation    Study Name:  ValAcylovir (ValA) versus ValGanciclovir (ValG) for Posttransplant CMV/EBV Prophylaxis: a randomized controlled trial (IRB# [id number])  ICF Version Date / IRB Approval Date:  11 July 2018 / 18 July 2018  IRB Approval dt:  25 July 2013  Date of Approach: 06 AUG 2019    Patient declined to participate in the study.

## 2019-08-06 NOTE — PLAN OF CARE
Hypertensive, trended up, noted to be pain and anxiety related. OVSS on RA. Felt feverish, Tmax 100.1, no other symptoms. Nauseated, given Zofran IV @ 0045. Potassium q4H checks, stable in mid 4s range. Tele-NSR-Sinus Tach. Regular diet, poor appetite. R TIJ - TKO. PIV -SL. R EARL - 40 ml serosanguineous. Ramirez - minimal at 75 ml, red tinged. DGF, hemodialyzed 8/4. LA fistula.  No BM, passing gas.    Per report, Thymo to be given @ midnight. No orders. Crosscover and fellow paged, noted that to simplify regimen, will have patient start Thymo in morning.

## 2019-08-07 ENCOUNTER — APPOINTMENT (OUTPATIENT)
Dept: GENERAL RADIOLOGY | Facility: CLINIC | Age: 29
DRG: 652 | End: 2019-08-07
Attending: TRANSPLANT SURGERY
Payer: MEDICARE

## 2019-08-07 ENCOUNTER — TELEPHONE (OUTPATIENT)
Dept: TRANSPLANT | Facility: CLINIC | Age: 29
End: 2019-08-07

## 2019-08-07 DIAGNOSIS — Z79.899 LONG TERM USE OF DRUG: ICD-10-CM

## 2019-08-07 DIAGNOSIS — Z94.0 KIDNEY REPLACED BY TRANSPLANT: Primary | ICD-10-CM

## 2019-08-07 LAB
ANION GAP SERPL CALCULATED.3IONS-SCNC: 14 MMOL/L (ref 3–14)
BASOPHILS # BLD AUTO: 0 10E9/L (ref 0–0.2)
BASOPHILS NFR BLD AUTO: 0 %
BUN SERPL-MCNC: 78 MG/DL (ref 7–30)
CALCIUM SERPL-MCNC: 8.8 MG/DL (ref 8.5–10.1)
CELL TYPE ALLO: NORMAL
CELL TYPE AUTO: NORMAL
CHANNELSHIFTALLOB1: -41
CHANNELSHIFTALLOB2: -57
CHANNELSHIFTALLOT1: -39
CHANNELSHIFTALLOT2: -44
CHANNELSHIFTAUTOB1: -74
CHANNELSHIFTAUTOB2: -79
CHANNELSHIFTAUTOT1: -86
CHANNELSHIFTAUTOT2: -92
CHLORIDE SERPL-SCNC: 98 MMOL/L (ref 94–109)
CO2 SERPL-SCNC: 20 MMOL/L (ref 20–32)
COMMENT ALLOB2: NORMAL
CREAT SERPL-MCNC: 9.6 MG/DL (ref 0.52–1.04)
CROSSMATCHDATEALLO: NORMAL
CROSSMATCHDATEAUTO: NORMAL
DIFFERENTIAL METHOD BLD: ABNORMAL
DONOR ALLO: NORMAL
DONOR AUTO: NORMAL
DONORCELLDATE ALLO: NORMAL
DONORCELLDATE AUTO: NORMAL
EOSINOPHIL # BLD AUTO: 0 10E9/L (ref 0–0.7)
EOSINOPHIL NFR BLD AUTO: 0 %
ERYTHROCYTE [DISTWIDTH] IN BLOOD BY AUTOMATED COUNT: 13.4 % (ref 10–15)
GFR SERPL CREATININE-BSD FRML MDRD: 5 ML/MIN/{1.73_M2}
GLUCOSE SERPL-MCNC: 90 MG/DL (ref 70–99)
HCT VFR BLD AUTO: 24.3 % (ref 35–47)
HGB BLD-MCNC: 7.9 G/DL (ref 11.7–15.7)
IMM GRANULOCYTES # BLD: 0 10E9/L (ref 0–0.4)
IMM GRANULOCYTES NFR BLD: 0.5 %
LYMPHOCYTES # BLD AUTO: 0.1 10E9/L (ref 0.8–5.3)
LYMPHOCYTES NFR BLD AUTO: 1.6 %
MAGNESIUM SERPL-MCNC: 2.7 MG/DL (ref 1.6–2.3)
MCH RBC QN AUTO: 31.6 PG (ref 26.5–33)
MCHC RBC AUTO-ENTMCNC: 32.5 G/DL (ref 31.5–36.5)
MCV RBC AUTO: 97 FL (ref 78–100)
MONOCYTES # BLD AUTO: 0.4 10E9/L (ref 0–1.3)
MONOCYTES NFR BLD AUTO: 7.5 %
NEUTROPHILS # BLD AUTO: 5 10E9/L (ref 1.6–8.3)
NEUTROPHILS NFR BLD AUTO: 90.4 %
NRBC # BLD AUTO: 0 10*3/UL
NRBC BLD AUTO-RTO: 0 /100
PHOSPHATE SERPL-MCNC: 6.4 MG/DL (ref 2.5–4.5)
PLATELET # BLD AUTO: 70 10E9/L (ref 150–450)
POS CUT OFF ALLO B: >101
POS CUT OFF ALLO T: >67
POS CUT OFF AUTO B: >101
POS CUT OFF AUTO T: >67
POTASSIUM SERPL-SCNC: 4.3 MMOL/L (ref 3.4–5.3)
RBC # BLD AUTO: 2.5 10E12/L (ref 3.8–5.2)
RESULT ALLO B1: NORMAL
RESULT ALLO B2: NORMAL
RESULT ALLO T1: NORMAL
RESULT ALLO T2: NORMAL
RESULT AUTO B1: NORMAL
RESULT AUTO B2: NORMAL
RESULT AUTO T1: NORMAL
RESULT AUTO T2: NORMAL
SA1 CELL: NORMAL
SA1 COMMENTS: NORMAL
SA1 HI RISK ABY: NORMAL
SA1 MOD RISK ABY: NORMAL
SA1 TEST METHOD: NORMAL
SA2 CELL: NORMAL
SA2 COMMENTS: NORMAL
SA2 HI RISK ABY UA: NORMAL
SA2 MOD RISK ABY: NORMAL
SA2 TEST METHOD: NORMAL
SERUM DATE ALLO B1: NORMAL
SERUM DATE ALLO B2: NORMAL
SERUM DATE ALLO T1: NORMAL
SERUM DATE ALLO T2: NORMAL
SERUM DATE AUTO B1: NORMAL
SERUM DATE AUTO B2: NORMAL
SERUM DATE AUTO T1: NORMAL
SERUM DATE AUTO T2: NORMAL
SODIUM SERPL-SCNC: 132 MMOL/L (ref 133–144)
TACROLIMUS BLD-MCNC: 3.9 UG/L (ref 5–15)
TESTMETHODALLO: NORMAL
TESTMETHODAUTO: NORMAL
TME LAST DOSE: ABNORMAL H
TREATMENT ALLO B1: NORMAL
TREATMENT ALLO B2: NORMAL
TREATMENT ALLO T1: NORMAL
TREATMENT ALLO T2: NORMAL
TREATMENT AUTO B1: NORMAL
TREATMENT AUTO B2: NORMAL
TREATMENT AUTO T1: NORMAL
TREATMENT AUTO T2: NORMAL
TROPONIN I SERPL-MCNC: <0.015 UG/L (ref 0–0.04)
UNACCEPTABLE ANTIGEN: NORMAL
UNOS CPRA: 25
WBC # BLD AUTO: 5.5 10E9/L (ref 4–11)

## 2019-08-07 PROCEDURE — 84484 ASSAY OF TROPONIN QUANT: CPT | Performed by: STUDENT IN AN ORGANIZED HEALTH CARE EDUCATION/TRAINING PROGRAM

## 2019-08-07 PROCEDURE — 80048 BASIC METABOLIC PNL TOTAL CA: CPT | Performed by: STUDENT IN AN ORGANIZED HEALTH CARE EDUCATION/TRAINING PROGRAM

## 2019-08-07 PROCEDURE — 25000132 ZZH RX MED GY IP 250 OP 250 PS 637: Mod: GY | Performed by: NURSE PRACTITIONER

## 2019-08-07 PROCEDURE — 80197 ASSAY OF TACROLIMUS: CPT | Performed by: SURGERY

## 2019-08-07 PROCEDURE — 25000132 ZZH RX MED GY IP 250 OP 250 PS 637: Mod: GY | Performed by: STUDENT IN AN ORGANIZED HEALTH CARE EDUCATION/TRAINING PROGRAM

## 2019-08-07 PROCEDURE — 85025 COMPLETE CBC W/AUTO DIFF WBC: CPT | Performed by: STUDENT IN AN ORGANIZED HEALTH CARE EDUCATION/TRAINING PROGRAM

## 2019-08-07 PROCEDURE — 36592 COLLECT BLOOD FROM PICC: CPT | Performed by: SURGERY

## 2019-08-07 PROCEDURE — 25000131 ZZH RX MED GY IP 250 OP 636 PS 637: Mod: GY | Performed by: NURSE PRACTITIONER

## 2019-08-07 PROCEDURE — 25000132 ZZH RX MED GY IP 250 OP 250 PS 637: Mod: GY | Performed by: SURGERY

## 2019-08-07 PROCEDURE — 36592 COLLECT BLOOD FROM PICC: CPT | Performed by: STUDENT IN AN ORGANIZED HEALTH CARE EDUCATION/TRAINING PROGRAM

## 2019-08-07 PROCEDURE — 90937 HEMODIALYSIS REPEATED EVAL: CPT

## 2019-08-07 PROCEDURE — 83735 ASSAY OF MAGNESIUM: CPT | Performed by: STUDENT IN AN ORGANIZED HEALTH CARE EDUCATION/TRAINING PROGRAM

## 2019-08-07 PROCEDURE — 25000131 ZZH RX MED GY IP 250 OP 636 PS 637: Mod: GY | Performed by: SURGERY

## 2019-08-07 PROCEDURE — 40000986 XR CHEST PORT 1 VW

## 2019-08-07 PROCEDURE — 93005 ELECTROCARDIOGRAM TRACING: CPT

## 2019-08-07 PROCEDURE — 84100 ASSAY OF PHOSPHORUS: CPT | Performed by: STUDENT IN AN ORGANIZED HEALTH CARE EDUCATION/TRAINING PROGRAM

## 2019-08-07 PROCEDURE — 25000125 ZZHC RX 250: Performed by: NURSE PRACTITIONER

## 2019-08-07 PROCEDURE — 25000128 H RX IP 250 OP 636: Performed by: INTERNAL MEDICINE

## 2019-08-07 PROCEDURE — 12000026 ZZH R&B TRANSPLANT

## 2019-08-07 PROCEDURE — 25000128 H RX IP 250 OP 636: Performed by: STUDENT IN AN ORGANIZED HEALTH CARE EDUCATION/TRAINING PROGRAM

## 2019-08-07 PROCEDURE — 93010 ELECTROCARDIOGRAM REPORT: CPT | Performed by: INTERNAL MEDICINE

## 2019-08-07 RX ORDER — DOCUSATE SODIUM 100 MG/1
100 CAPSULE, LIQUID FILLED ORAL DAILY PRN
Status: DISCONTINUED | OUTPATIENT
Start: 2019-08-07 | End: 2019-08-12 | Stop reason: HOSPADM

## 2019-08-07 RX ORDER — LIDOCAINE 50 MG/G
OINTMENT TOPICAL EVERY 4 HOURS PRN
Status: DISCONTINUED | OUTPATIENT
Start: 2019-08-07 | End: 2019-08-12 | Stop reason: HOSPADM

## 2019-08-07 RX ORDER — TACROLIMUS 1 MG/1
3 CAPSULE ORAL
Status: DISCONTINUED | OUTPATIENT
Start: 2019-08-07 | End: 2019-08-09

## 2019-08-07 RX ORDER — AMOXICILLIN 250 MG
2 CAPSULE ORAL 2 TIMES DAILY
Status: DISCONTINUED | OUTPATIENT
Start: 2019-08-07 | End: 2019-08-10

## 2019-08-07 RX ORDER — BISACODYL 10 MG
10 SUPPOSITORY, RECTAL RECTAL ONCE
Status: COMPLETED | OUTPATIENT
Start: 2019-08-07 | End: 2019-08-07

## 2019-08-07 RX ORDER — ACETAMINOPHEN 325 MG/1
975 TABLET ORAL EVERY 8 HOURS
Status: DISCONTINUED | OUTPATIENT
Start: 2019-08-07 | End: 2019-08-09

## 2019-08-07 RX ORDER — ACETAMINOPHEN 325 MG/1
975 TABLET ORAL EVERY 8 HOURS
Status: DISCONTINUED | OUTPATIENT
Start: 2019-08-07 | End: 2019-08-07

## 2019-08-07 RX ORDER — BUMETANIDE 0.25 MG/ML
4 INJECTION INTRAMUSCULAR; INTRAVENOUS ONCE
Status: DISCONTINUED | OUTPATIENT
Start: 2019-08-07 | End: 2019-08-07

## 2019-08-07 RX ADMIN — HYDROMORPHONE HYDROCHLORIDE 2 MG: 2 TABLET ORAL at 22:31

## 2019-08-07 RX ADMIN — ASPIRIN 81 MG CHEWABLE TABLET 81 MG: 81 TABLET CHEWABLE at 10:17

## 2019-08-07 RX ADMIN — SENNOSIDES AND DOCUSATE SODIUM 2 TABLET: 8.6; 5 TABLET ORAL at 21:35

## 2019-08-07 RX ADMIN — ONDANSETRON 4 MG: 2 INJECTION INTRAMUSCULAR; INTRAVENOUS at 09:02

## 2019-08-07 RX ADMIN — SODIUM CHLORIDE 300 ML: 9 INJECTION, SOLUTION INTRAVENOUS at 17:48

## 2019-08-07 RX ADMIN — TACROLIMUS 3 MG: 1 CAPSULE ORAL at 21:36

## 2019-08-07 RX ADMIN — MYCOPHENOLATE MOFETIL 750 MG: 250 CAPSULE ORAL at 21:34

## 2019-08-07 RX ADMIN — Medication 37.5 MCG: at 10:18

## 2019-08-07 RX ADMIN — MYCOPHENOLATE MOFETIL 750 MG: 250 CAPSULE ORAL at 09:06

## 2019-08-07 RX ADMIN — ACETAMINOPHEN 650 MG: 325 TABLET, FILM COATED ORAL at 00:43

## 2019-08-07 RX ADMIN — POLYETHYLENE GLYCOL 3350 17 G: 17 POWDER, FOR SOLUTION ORAL at 13:16

## 2019-08-07 RX ADMIN — AMLODIPINE BESYLATE 10 MG: 10 TABLET ORAL at 10:17

## 2019-08-07 RX ADMIN — HYDROMORPHONE HYDROCHLORIDE 2 MG: 2 TABLET ORAL at 00:43

## 2019-08-07 RX ADMIN — LIDOCAINE: 50 OINTMENT TOPICAL at 15:45

## 2019-08-07 RX ADMIN — SODIUM CHLORIDE 250 ML: 9 INJECTION, SOLUTION INTRAVENOUS at 17:49

## 2019-08-07 RX ADMIN — ACETAMINOPHEN 975 MG: 325 TABLET, FILM COATED ORAL at 10:18

## 2019-08-07 RX ADMIN — TACROLIMUS 2 MG: 1 CAPSULE ORAL at 09:05

## 2019-08-07 RX ADMIN — SEVELAMER CARBONATE 2400 MG: 800 TABLET, FILM COATED ORAL at 21:35

## 2019-08-07 RX ADMIN — ACETAMINOPHEN 975 MG: 325 TABLET, FILM COATED ORAL at 17:03

## 2019-08-07 RX ADMIN — SIMETHICONE CHEW TAB 80 MG 80 MG: 80 TABLET ORAL at 10:22

## 2019-08-07 RX ADMIN — BISACODYL 10 MG: 10 SUPPOSITORY RECTAL at 09:08

## 2019-08-07 RX ADMIN — ONDANSETRON 4 MG: 2 INJECTION INTRAMUSCULAR; INTRAVENOUS at 17:03

## 2019-08-07 RX ADMIN — CINACALCET HYDROCHLORIDE 30 MG: 30 TABLET, COATED ORAL at 21:35

## 2019-08-07 RX ADMIN — ATORVASTATIN CALCIUM 10 MG: 10 TABLET, FILM COATED ORAL at 10:17

## 2019-08-07 ASSESSMENT — ACTIVITIES OF DAILY LIVING (ADL)
ADLS_ACUITY_SCORE: 13

## 2019-08-07 ASSESSMENT — MIFFLIN-ST. JEOR: SCORE: 1265.24

## 2019-08-07 NOTE — PLAN OF CARE
BP (!) 138/95   Pulse 95   Temp 98.2  F (36.8  C) (Oral)   Resp 18   Ht 1.524 m (5')   Wt 60.4 kg (133 lb 1.6 oz)   SpO2 93%   BMI 25.99 kg/m      Afebrile; HR 's, other VSS on RA.  NSR or sinus tachycardia.  Pt had episode of chest pain (see provider notification), CXR clear, troponin WNL, EKG unchanged, BLE U/S pending collection.  Creatinine 9.6 (from 7.35).  LLQ abdominal pain partially relieved with lidocaine cream and scheduled tylenol.  Zofran provided for nausea.  Poor appetite on regular diet; rhea counts initiated.  Internal jugular line saline locked.  Voiding; missing hat but 50mL measured.  Suppository, enema, and miralax provided; pt had BMx2.  EARL with 10mL serosanguinous output.  Incision sealed with liquid bandage; open to air.  Up with SBA.  Specialty pharmacy provided education.  Pt transferred to dialysis at 17:00.  Continue with plan of care and notify provider with changes.

## 2019-08-07 NOTE — PLAN OF CARE
VS:  Afebrile, 's/80-90's, HR 90's, NSR, no ectopy noted.  LDA: Left arm AV fistula intact, right internal jugular TLC saline locked.  Neuro: A&Ox4.  GI/: Voided 25cc of brownish with sediment urine output, no BM during the shift, passing gas.  Diet/appetite: Regular diet, fair PO intake.  Activity: Encourage ambulation 4 times a day.  Pain/Nausea/Vomiting: Scheduled Tylenol and PRN Dilaudid PO given x2 for abdominal/incisional pain with relief. Zofran and Compazine given x1 for nausea.  Skin: Surgical incision with liquid bandage c/d/I. Right EARL with small amount of serosanguinous output, dressing c/d/I.  Mobility: SBA with walker.  Test/Procedure: N/A.  Plan: Continue plan of cares.

## 2019-08-07 NOTE — PROGRESS NOTES
Transplant Surgery  Inpatient Daily Progress Note  08/07/2019    Assessment & Plan: 29yo woman with history of ESKD 2/2 IgA nephropathy on HD since 12/2013 via LUE AVF MWF. Other PMH includes SAH in 2014 while on Coumadin and subsequent seizure x2, hypothyroidism, chronic ITP, hyperprolactinemia 2/2 to pituitary adenoma. S/p DCD DDKT with 3 renal arteries on 8/3/19.     Graft function: POD #4  Kidney: Cr 7.6->9.4. UOP remains low.   -POD0 K 6.4, improved s/p dialysis.  -POD1 dialysis for hypervolemia, hypertension, tachypneic  -Dialysis today d/t hypervolemia and nausea  -Pain moderately controlled on Tylenol and PO Dilaudid     Immunosuppression management:   Thymoglobulin 100/100/125, for a total of 325 mg or 6.02 mg/kg.  Solu-Medrol 500mg intra-op (not documented)/250mg/100mg  Tacrolimus 2mg BID   Cellcept 750mg BID     Hematology:   Anemia: HGB 7.9 after RBC transfusion 8/6.   Thrombocytopenia: D/t thymo. Monitor.     Cardiorespiratory: POD1 Difficulty breathing, chest fullness, leg tightness in evening. BNP elevated, weight up otherwise labs, troponin, EKG, BLE US negative. Improved with dialysis.  Aspirin 81 (graft with 3 arteries)  Atorvastatin 10mg  Amlodipine 10mg  Hydralazine PRN     GI/Nutrition:   Nausea: Likely d/t constipation. Zofran PRN.   Constipation: Suppository, enema today.  Diet: Add supplements and rhea counts.    Endocrine:   Steroid hyperglycemia: Resolved.  Hypothyroidism: Levothyroxine 37.5 mcg     Fluid/Electrolytes:   Hyperkalemia post-op: K 6.4 post-op, required emergent dialysis. K now stabilized around 4.3.   Hypervolemia: Stop lasix.     :   Bladder spasms: B&O suppository PRN, Ditropan PRN  Vaginal yeast infection: Pt reports symptoms and discharge. Will start miconazole suppositories.     Infectious disease:   Holding Dapsone pending G6PD result  Valgancyclovir starting 8/5     Prophylaxis:   IS, SCDs     Disposition: 7A      Medical Decision Making: Medium  Subsequent visit 73091  (moderate level decision making)    Resident: Ondina Deleon NP     Faculty: Dr. Michel  _________________________________________________________________  Transplant History: Admitted 8/2/2019 for kidney transplant.  8/3/2019 (Kidney), Postoperative day: 4     Interval History:  Nausea persists. Small BM, still feels distended. Feels edematous. Some incisional pain.     ROS:   A 10-point review of systems was negative except as noted above.    Meds:    acetaminophen  975 mg Oral Q8H     amLODIPine  10 mg Oral Daily     aspirin  81 mg Oral Daily     atorvastatin  10 mg Oral Daily     bumetanide  4 mg Intravenous Once     cinacalcet  30 mg Oral Daily with supper     levothyroxine  37.5 mcg Oral Q Mon Wed Fri AM     levothyroxine  25 mcg Oral Once per day on Sun Tue Thu Sat     multivitamin RENAL  1 capsule Oral Daily     mycophenolate  750 mg Oral BID IS     polyethylene glycol  17 g Oral Daily     senna-docusate  2 tablet Oral BID     sevelamer  2,400 mg Oral TID w/meals     sodium chloride (PF)  10 mL Intracatheter Q8H     tacrolimus  2 mg Oral BID IS     valGANciclovir  450 mg Oral Once per day on Mon Thu       Physical Exam:     Admit Weight: 54 kg (119 lb 0.8 oz)  Today's weight: 133 lbs 1.6 oz    Vital sign ranges:    Temp:  [97.7  F (36.5  C)-99.3  F (37.4  C)] 98.5  F (36.9  C)  Heart Rate:  [] 101  Resp:  [18-24] 20  BP: (130-153)/() 148/102  SpO2:  [92 %-97 %] 97 %    GENERAL: Sitting up in a chair  SKIN: No jaundice. No rashes or lesions.   HEENT: Eyes symmetrical and free of discharge bilaterally.  CV: well perfused  RESPIRATORY: Respirations regular, even, and unlabored  GI: Distended, tympanic, soft. Incision with dermabond, C/D/I, EARL drain with serosanguinous output.  : No brooks  EXTREMITIES: +1 BLE edema. L AVF   NEUROLOGIC: Alert and orientated x 4. No focal deficits.   MUSCULOSKELETAL: No joint swelling or tenderness.      Data:   CMP  Recent Labs   Lab 08/07/19  0541  08/06/19  0545  08/03/19  1047 08/02/19 1938   * 133   < > 131* 134   POTASSIUM 4.3 4.2   < > 4.1 3.6   CHLORIDE 98 99   < >  --  91*   CO2 20 23   < >  --  35*   GLC 90 85   < > 111* 82   BUN 78* 45*   < >  --  22   CR 9.60* 7.35*   < >  --  6.11*   GFRESTIMATED 5* 7*   < >  --  9*   GFRESTBLACK 6* 8*   < >  --  10*   SHAMIR 8.8 9.2   < >  --  9.8   ICAW  --   --   --  5.0  --    MAG 2.7* 2.1   < >  --   --    PHOS 6.4* 5.8*   < >  --   --    ALBUMIN  --   --   --   --  4.2   BILITOTAL  --   --   --   --  0.8   ALKPHOS  --   --   --   --  65   AST  --   --   --   --  4   ALT  --   --   --   --  13    < > = values in this interval not displayed.     CBC  Recent Labs   Lab 08/07/19  0541 08/06/19  2206 08/06/19  0545  08/03/19  1240   HGB 7.9* 8.4* 6.9*   < > 8.5*   WBC 5.5  --  6.3   < > 9.9   PLT 70*  --  79*   < > 113*   A1C  --   --   --   --  4.8    < > = values in this interval not displayed.     COAGS  Recent Labs   Lab 08/02/19 1938   INR 0.94   PTT 33      Urinalysis  Recent Labs   Lab Test 12/11/13  1915 12/10/13  2050 05/15/13  1951   COLOR  --  Straw Dark Yellow   APPEARANCE  --  Clear Slightly Cloudy   URINEGLC  --  70* Negative   URINEBILI  --  Negative Negative   URINEKETONE  --  Negative Negative   SG  --  1.008 1.011   UBLD  --  Small* Moderate*   URINEPH  --  6.5 6.0   PROTEIN  --  100* 300*   NITRITE  --  Negative Negative   LEUKEST  --  Negative Large*   RBCU  --  2 19*   WBCU  --  9* 151*   UTPG 7.42*  --   --      Virology:  CMV IgG Antibody   Date Value Ref Range Status   12/26/2013 >10.00  Positive for anti-CMV IgG U/mL Final     EBV VCA IgG Antibody   Date Value Ref Range Status   12/26/2013 57.80 U/mL Final     Comment:     Positive, suggests immunologic exposure.     Hepatitis C Antibody   Date Value Ref Range Status   08/02/2019 Nonreactive NR^Nonreactive Final     Comment:     Assay performance characteristics have not been established for newborns,   infants, and children       Hep  B Surface Noemy   Date Value Ref Range Status   12/12/2013 34.4  Final     Comment:     Reactive, Patient is considered to be immune to infection with hepatitis B   when   the value is greater than or equal to 12.0 mlU/mL.

## 2019-08-07 NOTE — PROGRESS NOTES
CLINICAL NUTRITION SERVICES - BRIEF NOTE     Nutrition Prescription    Recommendations already ordered by Registered Dietitian (RD):  Boost Breeze TID between meals        EVALUATION OF THE PROGRESS TOWARD GOALS   Diet: Regular + Calorie counts (8/7-8/9) ordered by team today    Intake: Patient consuming minimally, plans to try some food from home after AXR.  She did not eat any breakfast yet.   She reports poor appetite and abdominal fullness with PO intake.      INTERVENTIONS  Discussed oral supplement options, encouraged trying.  Patient agreeable to Boost Breeze, trying all flavors today.  Briefly reviewed food safety, avoiding food from home being out at room temperature for >2 hours.  Encouraged use of 7C patient fridge for food storage PRN.  Patient leaving for AXR at time of visit, will plan to return to review post-transplant diet education in more detail and evaluate tolerance to oral supplements.     Monitoring/Evaluation  Progress toward goals will be monitored and evaluated per protocol.    Nancy Vaca MS, RD, LD  Pager [phone]

## 2019-08-07 NOTE — PROVIDER NOTIFICATION
Pt reporting chest pain radiating to the back, SOB.  's in sinus tachycardia.  Sats 97% on RA.  Provider notified.  CXR, EXG and troponins ordered.  Pt to transfer to dialysis this afternoon.  Will continue to monitor and notify provider with changes.

## 2019-08-07 NOTE — PHARMACY-MEDICATION TEACHING
No current outpatient medications on file.     Lab Results   Component Value Date    TACROL 3.9 (L) 08/07/2019    CR 9.60 (H) 08/07/2019    WBC 5.5 08/07/2019    MAG 2.7 (H) 08/07/2019    PHOS 6.4 (H) 08/07/2019     BP Readings from Last 3 Encounters:   08/07/19 (!) 145/97   07/11/19 118/79   07/09/19 135/90       Visited Mona in hospital room prior to discharge to review medications, review discharge process and review specialty pharmacy program. Also present was her spouse, Jt.   How are you feeling today? Drowsy.   Is this your first transplant? Yes   Patient received a Kidney transplant on 8/3/19.  Patient's immunosuppression regimen consists of:   Mycophenolate   Tacrolimus (goal range 8-10 per RPh note 8/7/19)   None  Patient's prophylactic medications include:   Dapson      Valcyte     Have you watched the educational transplant DVD? Patient unsure.   Medication education/DUR performed, including medication uses, dosing and side effects? Yes  Are you experiencing any side effects? Yes   Are you taking any medications for cholesterol? Yes   Are you taking any other medications, including herbals, vitamins, and OTC products? No   What questions do you have about the medications you are taking? Why aspirin and atorvastatin. Reviewed with patient.    Have you been instructed on how to take your blood pressure? Yes  Medication adherance plan:   Set up: pillbox  - Gave 7 day pill organizer.   Reminder: smart phone  - encouraged.   Dosing: medication card  - this pharmacist wrote new med card.   Assistance: significant other  - spouse, will help.   If this is not your first transplant, did you have trouble taking your medication as prescribed with your previous transplant? N/A    Patient enrolled in specialty pharmacy program? 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       Pharmacist assessment/Plan:  1. Adherence: good  2. Side effects: drowsiness.   3. Blood pressure: being managed by team.   4. Additional  comments:   Pharmacist gave bp monitor, thermometer and 7 day pill organizer.     Reminders:    1. Bring to first clinic appt: med box, med card, bp monitor, all medications being taken, and lab book.  2. MTM pharmacist visit on first clinic appt and if ok, again in 3 to 4 months during follow up appt.  3. Avoid Grapefruit and Grapefruit juice.   4. Avoid herbal supplements. If wish to take other medications or supplements, call your coordinator.   5.  Keep lab appts.   6.  Can use apps on phone like [employer] [facility] to help manage medication lists and reminders.   Coordinator is Melani    No further questions for this pharmacist.     Trav Nicole, R.Ph.  [facility]  [phone] pager

## 2019-08-08 ENCOUNTER — APPOINTMENT (OUTPATIENT)
Dept: ULTRASOUND IMAGING | Facility: CLINIC | Age: 29
DRG: 652 | End: 2019-08-08
Attending: TRANSPLANT SURGERY
Payer: MEDICARE

## 2019-08-08 ENCOUNTER — APPOINTMENT (OUTPATIENT)
Dept: ULTRASOUND IMAGING | Facility: CLINIC | Age: 29
DRG: 652 | End: 2019-08-08
Attending: NURSE PRACTITIONER
Payer: MEDICARE

## 2019-08-08 PROBLEM — T86.19 DELAYED GRAFT FUNCTION OF KIDNEY: Status: ACTIVE | Noted: 2019-08-08

## 2019-08-08 PROBLEM — Z76.82 KIDNEY TRANSPLANT CANDIDATE: Status: RESOLVED | Noted: 2019-08-02 | Resolved: 2019-08-08

## 2019-08-08 PROBLEM — R11.0 NAUSEA: Status: ACTIVE | Noted: 2019-08-08

## 2019-08-08 LAB
ALBUMIN UR-MCNC: 300 MG/DL
ANION GAP SERPL CALCULATED.3IONS-SCNC: 10 MMOL/L (ref 3–14)
APPEARANCE UR: ABNORMAL
BILIRUB UR QL STRIP: NEGATIVE
BUN SERPL-MCNC: 47 MG/DL (ref 7–30)
CALCIUM SERPL-MCNC: 8.5 MG/DL (ref 8.5–10.1)
CHLORIDE SERPL-SCNC: 98 MMOL/L (ref 94–109)
CO2 SERPL-SCNC: 24 MMOL/L (ref 20–32)
COLOR UR AUTO: ABNORMAL
CREAT SERPL-MCNC: 6.83 MG/DL (ref 0.52–1.04)
ERYTHROCYTE [DISTWIDTH] IN BLOOD BY AUTOMATED COUNT: 13.2 % (ref 10–15)
G6PD RBC-CCNC: 13.6 U/G HB (ref 9.9–16.6)
GFR SERPL CREATININE-BSD FRML MDRD: 8 ML/MIN/{1.73_M2}
GLUCOSE SERPL-MCNC: 85 MG/DL (ref 70–99)
GLUCOSE UR STRIP-MCNC: NEGATIVE MG/DL
HCT VFR BLD AUTO: 24.2 % (ref 35–47)
HGB BLD-MCNC: 7.7 G/DL (ref 11.7–15.7)
HGB UR QL STRIP: ABNORMAL
INTERPRETATION ECG - MUSE: NORMAL
KETONES UR STRIP-MCNC: NEGATIVE MG/DL
LEUKOCYTE ESTERASE UR QL STRIP: ABNORMAL
MAGNESIUM SERPL-MCNC: 2.2 MG/DL (ref 1.6–2.3)
MCH RBC QN AUTO: 31 PG (ref 26.5–33)
MCHC RBC AUTO-ENTMCNC: 31.8 G/DL (ref 31.5–36.5)
MCV RBC AUTO: 98 FL (ref 78–100)
NITRATE UR QL: NEGATIVE
PH UR STRIP: 6 PH (ref 5–7)
PHOSPHATE SERPL-MCNC: 4.6 MG/DL (ref 2.5–4.5)
PLATELET # BLD AUTO: 73 10E9/L (ref 150–450)
POTASSIUM SERPL-SCNC: 3.8 MMOL/L (ref 3.4–5.3)
RBC # BLD AUTO: 2.48 10E12/L (ref 3.8–5.2)
RBC #/AREA URNS AUTO: >182 /HPF (ref 0–2)
SODIUM SERPL-SCNC: 132 MMOL/L (ref 133–144)
SOURCE: ABNORMAL
SP GR UR STRIP: 1.01 (ref 1–1.03)
SQUAMOUS #/AREA URNS AUTO: 3 /HPF (ref 0–1)
UROBILINOGEN UR STRIP-MCNC: NORMAL MG/DL (ref 0–2)
WBC # BLD AUTO: 5.3 10E9/L (ref 4–11)
WBC #/AREA URNS AUTO: 9 /HPF (ref 0–5)

## 2019-08-08 PROCEDURE — 83735 ASSAY OF MAGNESIUM: CPT | Performed by: STUDENT IN AN ORGANIZED HEALTH CARE EDUCATION/TRAINING PROGRAM

## 2019-08-08 PROCEDURE — 25000132 ZZH RX MED GY IP 250 OP 250 PS 637: Mod: GY | Performed by: NURSE PRACTITIONER

## 2019-08-08 PROCEDURE — 36592 COLLECT BLOOD FROM PICC: CPT | Performed by: STUDENT IN AN ORGANIZED HEALTH CARE EDUCATION/TRAINING PROGRAM

## 2019-08-08 PROCEDURE — 25000132 ZZH RX MED GY IP 250 OP 250 PS 637: Mod: GY | Performed by: STUDENT IN AN ORGANIZED HEALTH CARE EDUCATION/TRAINING PROGRAM

## 2019-08-08 PROCEDURE — 25000132 ZZH RX MED GY IP 250 OP 250 PS 637: Mod: GY | Performed by: PHYSICIAN ASSISTANT

## 2019-08-08 PROCEDURE — 25000128 H RX IP 250 OP 636: Performed by: STUDENT IN AN ORGANIZED HEALTH CARE EDUCATION/TRAINING PROGRAM

## 2019-08-08 PROCEDURE — 84100 ASSAY OF PHOSPHORUS: CPT | Performed by: STUDENT IN AN ORGANIZED HEALTH CARE EDUCATION/TRAINING PROGRAM

## 2019-08-08 PROCEDURE — 25000132 ZZH RX MED GY IP 250 OP 250 PS 637: Mod: GY | Performed by: SURGERY

## 2019-08-08 PROCEDURE — 25000131 ZZH RX MED GY IP 250 OP 636 PS 637: Mod: GY | Performed by: SURGERY

## 2019-08-08 PROCEDURE — 80048 BASIC METABOLIC PNL TOTAL CA: CPT | Performed by: STUDENT IN AN ORGANIZED HEALTH CARE EDUCATION/TRAINING PROGRAM

## 2019-08-08 PROCEDURE — 25000131 ZZH RX MED GY IP 250 OP 636 PS 637: Mod: GY | Performed by: NURSE PRACTITIONER

## 2019-08-08 PROCEDURE — 85027 COMPLETE CBC AUTOMATED: CPT | Performed by: STUDENT IN AN ORGANIZED HEALTH CARE EDUCATION/TRAINING PROGRAM

## 2019-08-08 PROCEDURE — 87086 URINE CULTURE/COLONY COUNT: CPT | Performed by: TRANSPLANT SURGERY

## 2019-08-08 PROCEDURE — 81001 URINALYSIS AUTO W/SCOPE: CPT | Performed by: NURSE PRACTITIONER

## 2019-08-08 PROCEDURE — 12000026 ZZH R&B TRANSPLANT

## 2019-08-08 PROCEDURE — 76776 US EXAM K TRANSPL W/DOPPLER: CPT

## 2019-08-08 PROCEDURE — 93970 EXTREMITY STUDY: CPT

## 2019-08-08 RX ORDER — POLYETHYLENE GLYCOL 3350 17 G/17G
17 POWDER, FOR SOLUTION ORAL 2 TIMES DAILY
Status: DISCONTINUED | OUTPATIENT
Start: 2019-08-08 | End: 2019-08-10

## 2019-08-08 RX ORDER — DAPSONE 100 MG/1
100 TABLET ORAL DAILY
Status: DISCONTINUED | OUTPATIENT
Start: 2019-08-08 | End: 2019-08-09

## 2019-08-08 RX ORDER — HYDROXYZINE HYDROCHLORIDE 10 MG/1
10 TABLET, FILM COATED ORAL 3 TIMES DAILY PRN
Status: DISCONTINUED | OUTPATIENT
Start: 2019-08-08 | End: 2019-08-10

## 2019-08-08 RX ADMIN — ASPIRIN 81 MG CHEWABLE TABLET 81 MG: 81 TABLET CHEWABLE at 09:02

## 2019-08-08 RX ADMIN — POLYETHYLENE GLYCOL 3350 17 G: 17 POWDER, FOR SOLUTION ORAL at 09:02

## 2019-08-08 RX ADMIN — HYDROXYZINE HYDROCHLORIDE 10 MG: 10 TABLET ORAL at 19:29

## 2019-08-08 RX ADMIN — Medication 2 MG: at 23:34

## 2019-08-08 RX ADMIN — BISACODYL 10 MG: 10 SUPPOSITORY RECTAL at 15:52

## 2019-08-08 RX ADMIN — SENNOSIDES AND DOCUSATE SODIUM 2 TABLET: 8.6; 5 TABLET ORAL at 09:02

## 2019-08-08 RX ADMIN — SIMETHICONE CHEW TAB 80 MG 80 MG: 80 TABLET ORAL at 19:37

## 2019-08-08 RX ADMIN — DAPSONE 100 MG: 100 TABLET ORAL at 09:01

## 2019-08-08 RX ADMIN — MYCOPHENOLATE MOFETIL 750 MG: 250 CAPSULE ORAL at 17:23

## 2019-08-08 RX ADMIN — MYCOPHENOLATE MOFETIL 750 MG: 250 CAPSULE ORAL at 09:00

## 2019-08-08 RX ADMIN — TACROLIMUS 3 MG: 1 CAPSULE ORAL at 09:00

## 2019-08-08 RX ADMIN — POLYETHYLENE GLYCOL 3350 17 G: 17 POWDER, FOR SOLUTION ORAL at 20:54

## 2019-08-08 RX ADMIN — ACETAMINOPHEN 975 MG: 325 TABLET, FILM COATED ORAL at 17:23

## 2019-08-08 RX ADMIN — PROCHLORPERAZINE EDISYLATE 5 MG: 5 INJECTION INTRAMUSCULAR; INTRAVENOUS at 02:46

## 2019-08-08 RX ADMIN — ACETAMINOPHEN 975 MG: 325 TABLET, FILM COATED ORAL at 09:57

## 2019-08-08 RX ADMIN — ONDANSETRON 4 MG: 2 INJECTION INTRAMUSCULAR; INTRAVENOUS at 00:47

## 2019-08-08 RX ADMIN — AMLODIPINE BESYLATE 10 MG: 10 TABLET ORAL at 09:02

## 2019-08-08 RX ADMIN — Medication 1 CAPSULE: at 09:02

## 2019-08-08 RX ADMIN — ONDANSETRON 4 MG: 2 INJECTION INTRAMUSCULAR; INTRAVENOUS at 17:49

## 2019-08-08 RX ADMIN — LEVOTHYROXINE SODIUM 25 MCG: 25 TABLET ORAL at 09:02

## 2019-08-08 RX ADMIN — SIMETHICONE CHEW TAB 80 MG 80 MG: 80 TABLET ORAL at 14:30

## 2019-08-08 RX ADMIN — ACETAMINOPHEN 975 MG: 325 TABLET, FILM COATED ORAL at 02:47

## 2019-08-08 RX ADMIN — HYDROXYZINE HYDROCHLORIDE 10 MG: 10 TABLET ORAL at 22:37

## 2019-08-08 RX ADMIN — VALGANCICLOVIR 450 MG: 450 TABLET, FILM COATED ORAL at 09:01

## 2019-08-08 RX ADMIN — SENNOSIDES AND DOCUSATE SODIUM 2 TABLET: 8.6; 5 TABLET ORAL at 20:51

## 2019-08-08 RX ADMIN — TACROLIMUS 3 MG: 1 CAPSULE ORAL at 17:23

## 2019-08-08 RX ADMIN — ATORVASTATIN CALCIUM 10 MG: 10 TABLET, FILM COATED ORAL at 09:02

## 2019-08-08 RX ADMIN — ONDANSETRON 4 MG: 2 INJECTION INTRAMUSCULAR; INTRAVENOUS at 08:56

## 2019-08-08 RX ADMIN — Medication 1 MG: at 15:42

## 2019-08-08 ASSESSMENT — MIFFLIN-ST. JEOR: SCORE: 1248

## 2019-08-08 ASSESSMENT — ACTIVITIES OF DAILY LIVING (ADL)
ADLS_ACUITY_SCORE: 13

## 2019-08-08 NOTE — PROGRESS NOTES
[facility], [place]   Transplant Nephrology Progress Note  Date of Admission:  2019  Today's Date: 2019     Assessment & Plan    # DDKT: baseline Cr ~ TBD; DCD complicated by DGF              - Proteinuria: Not checked post transplant              - Date DSA Last Checked:Pending   - feels better after dialysis      # Immunosuppression: usual standard induction    Tacrolimus immediate release (goal 8-10) and Mycophenolate mofetil (goal 1-3.5)              - Changes: No     # Prophylaxis:              - PJP: Dapsone              - CMV: Valcyte              - Thrush: None     # Hypertension: Controlled; Goal BP: < 140/90              - Volume status:  hypervolemic              - Changes: No     # Anemia in Chronic Renal Disease: Hgb: Stable      CHARLINE: No              - Iron studies: Not checked recently     # Mineral Bone Disorder:   - Secondary renal hyperparathyroidism; PTH level: Not checked recently  - Vitamin D; level: Not checked recently     # Electrolytes:   - Potassium; level: Hyperkalemia resolved   - Magnesium; level: Normal     # Transplant History:  Etiology of kidney failure: IgA nephropathy  Tx: DDKT  Transplant: 8/3/2019 (Kidney)  Donor Type:  - Cardiac Death        Donor Class: Standard Criteria Donor  Crossmatch at time of Tx: negative  Significant changes in immunosuppression: None  Significant transplant-related complications: None     Recommendations were communicated to the primary team via this note  Dean Wilson MD  Pager: [phone]    Interval History     Stable overnight, she tolerated dialysis well. Some nausea, no chest pains but feels swollen still     Review of Systems   4 point ROS was obtained and negative except as noted in the Interval History.    Physical Exam   Temp  Av.3  F (36.8  C)  Min: 97.6  F (36.4  C)  Max: 98.9  F (37.2  C)      Pulse  Av.1  Min: 67  Max: 129 Resp  Av.1  Min: 10  Max: 22  SpO2  Av.7 %  Min:  91 %  Max: 100 %    CVP (mmHg): 7 mmHgBP 131/85 (BP Location: Right arm)   Pulse 99   Temp 99  F (37.2  C) (Oral)   Resp 16   Ht 1.524 m (5')   Wt 58.7 kg (129 lb 4.8 oz)   SpO2 93%   BMI 25.25 kg/m     Date 08/04/19 0700 - 08/05/19 0659   Shift 1654-8671 3997-6497 1176-0665 24 Hour Total   INTAKE   I.V. 30   30   Shift Total(mL/kg) 30(0.56)   30(0.56)   OUTPUT   Shift Total(mL/kg)       Weight (kg) 54 54 54 54      Admit Weight: 54 kg (119 lb 0.8 oz)   GENERAL APPEARANCE: alert and no distress  HENT: mouth without ulcers or lesions  LYMPHATICS: no cervical or supraclavicular nodes  RESP: lungs clear to auscultation - no rales, rhonchi or wheezes  CV: regular rhythm, normal rate, no rub, no murmur  EDEMA: 2+ LE edema bilaterally  ABDOMEN: soft, nondistended, nontender, bowel sounds normal  MS: extremities normal - no gross deformities noted, no evidence of inflammation in joints, no muscle tenderness  SKIN: no rash    Data   All labs reviewed by me.  CMP  Recent Labs   Lab 08/08/19  0643 08/07/19  0541 08/06/19  0545 08/06/19  0058  08/05/19  0532  08/02/19  1938   * 132* 133  --   --  132*   < > 134   POTASSIUM 3.8 4.3 4.2 4.3   < > 4.6   < > 3.6   CHLORIDE 98 98 99  --   --  100   < > 91*   CO2 24 20 23  --   --  27   < > 35*   ANIONGAP 10 14 11  --   --  6   < > 8   GLC 85 90 85  --   --  126*   < > 82   BUN 47* 78* 45*  --   --  17   < > 22   CR 6.83* 9.60* 7.35*  --   --  4.24*   < > 6.11*   GFRESTIMATED 8* 5* 7*  --   --  13*   < > 9*   GFRESTBLACK 9* 6* 8*  --   --  16*   < > 10*   SHAMIR 8.5 8.8 9.2  --   --  9.2   < > 9.8   MAG 2.2 2.7* 2.1  --   --  1.7   < >  --    PHOS 4.6* 6.4* 5.8*  --   --  4.2   < >  --    PROTTOTAL  --   --   --   --   --   --   --  8.6   ALBUMIN  --   --   --   --   --   --   --  4.2   BILITOTAL  --   --   --   --   --   --   --  0.8   ALKPHOS  --   --   --   --   --   --   --  65   AST  --   --   --   --   --   --   --  4   ALT  --   --   --   --   --   --   --  13    <  > = values in this interval not displayed.     CBC  Recent Labs   Lab 08/08/19  0643 08/07/19  0541 08/06/19  2206 08/06/19  0545 08/05/19  0532   HGB 7.7* 7.9* 8.4* 6.9* 7.7*   WBC 5.3 5.5  --  6.3 5.1   RBC 2.48* 2.50*  --  2.18* 2.45*   HCT 24.2* 24.3*  --  21.8* 25.0*   MCV 98 97  --  100 102*   MCH 31.0 31.6  --  31.7 31.4   MCHC 31.8 32.5  --  31.7 30.8*   RDW 13.2 13.4  --  13.1 12.8   PLT 73* 70*  --  79* 75*     INR  Recent Labs   Lab 08/02/19  1938   INR 0.94   PTT 33     ABG  Recent Labs   Lab 08/03/19  1047   O2PER 40      Urine Studies  Recent Labs   Lab Test 12/10/13  2050 05/15/13  1951   COLOR Straw Dark Yellow   APPEARANCE Clear Slightly Cloudy   URINEGLC 70* Negative   URINEBILI Negative Negative   URINEKETONE Negative Negative   SG 1.008 1.011   UBLD Small* Moderate*   URINEPH 6.5 6.0   PROTEIN 100* 300*   NITRITE Negative Negative   LEUKEST Negative Large*   RBCU 2 19*   WBCU 9* 151*     Recent Labs   Lab Test 12/11/13  1915   UTPG 7.42*     PTH  Recent Labs   Lab Test 12/11/13  0601   PTHI 1,131*     Iron Studies  Recent Labs   Lab Test 12/29/16  1338 12/11/13  0600   IRON 137 65   * 245   IRONSAT 60* 26   MONET 1,039*  --        IMAGING:  All imaging studies reviewed by me.     MEDICATIONS:  Current Facility-Administered Medications   Medication     acetaminophen (TYLENOL) tablet 975 mg     amLODIPine (NORVASC) tablet 10 mg     aspirin (ASA) chewable tablet 81 mg     atorvastatin (LIPITOR) tablet 10 mg     benzocaine 20% (HURRICAINE/TOPEX) 20 % spray 0.5-1 mL     bisacodyl (DULCOLAX) Suppository 10 mg     dapsone (ACZONE) tablet 100 mg     dextrose 10 % 1,000 mL infusion     docusate sodium (COLACE) capsule 100 mg     hydrALAZINE (APRESOLINE) injection 10 mg     HYDROmorphone (DILAUDID) half-tab 1-2 mg     hydrOXYzine (ATARAX) tablet 10 mg     levothyroxine (SYNTHROID/LEVOTHROID) half-tab 37.5 mcg     levothyroxine (SYNTHROID/LEVOTHROID) tablet 25 mcg     lidocaine (LMX4) cream      lidocaine (XYLOCAINE) 5 % ointment     lidocaine 1 % 0.1-1 mL     miconazole (MICATIN) vaginal suppository 100 mg     multivitamin RENAL (NEPHROCAPS/TRIPHROCAPS) capsule 1 capsule     mycophenolate (GENERIC EQUIVALENT) capsule 750 mg     naloxone (NARCAN) injection 0.1-0.4 mg     ondansetron (ZOFRAN) injection 4 mg     opium-belladonna (B&O SUPPRETTES) 30-16.2 MG per suppository 0.5 suppository     polyethylene glycol (MIRALAX/GLYCOLAX) Packet 17 g     senna-docusate (SENOKOT-S/PERICOLACE) 8.6-50 MG per tablet 2 tablet     sevelamer (RENVELA) tablet 2,400 mg     simethicone (MYLICON) chewable tablet 80 mg     sodium chloride (PF) 0.9% PF flush 10 mL     sodium chloride (PF) 0.9% PF flush 10-20 mL     tacrolimus (GENERIC EQUIVALENT) capsule 3 mg     valGANciclovir (VALCYTE) tablet 450 mg

## 2019-08-08 NOTE — PROGRESS NOTES
HEMODIALYSIS TREATMENT NOTE    Date: 8/7/2019  Time: 8:45 PM    Data:  Pre Wt: 60.4 kg (133 lb 2.5 oz)   Desired Wt: 57.9 kg   Post Wt: pt asked to weigh in the morning as her feet hurt from fluid  Weight change:   Ultrafiltration - Post Run Net Total Removed (mL): 2500 mL  Vascular Access Status: patent  Dialyzer Rinse: Streaked  Total Blood Volume Processed: 50.3 L Liters  Total Dialysis (Treatment) Time: 3 Hours    Lab:   No    Interventions:Assessment:  Tx complete. K 3/Ca 2.25. . AVF utilized without complication. Pt self cannulated button hole fistula. Thrill and bruit present. 2.5 L of fluid obtained. VSS throughout tx. Ending /93. Hand off report given to primary nurse.     Plan:    Per nephrology team.

## 2019-08-08 NOTE — PROGRESS NOTES
CLINICAL NUTRITION SERVICES - REASSESSMENT NOTE     Nutrition Prescription    Malnutrition Status:    Does not meet 2 of the necessary criteria to qualify for malnutrition    Recommendations already ordered by Registered Dietitian (RD):  Change to Boost Plus (vanilla/strawberry) 2x/day + Gelatein Plus (pinapple)    Future/Additional Recommendations:  Minimize diet restrictions as able d/t high calorie/protein needs post-transplant.  Encouraged use of oral supplements to help meet needs.  Ideally calorie counts would be at least 900 kcal, 40 grams of protein daily (60% of assessed needs).       EVALUATION OF THE PROGRESS TOWARD GOALS   Diet: Regular    Intake: Pt feels Boost Breeze is too sweet.  Snacking on crackers.  Taking 1/4-1/2 cup of rice and a couple pieces of meat from meals brought from home.        NEW FINDINGS   Patient with DGF, requiring dialysis PRN  Labs: Na 132 (low), Phos 4.6 (high)    GI: Some nausea, but mostly complains of fullness/distention in abdomen.     Weight: Currently up ~10# from admission weight.     MALNUTRITION  % Intake: </= 50% for >/= 5 days (severe)  % Weight Loss: None noted  Subcutaneous Fat Loss: None observed  Muscle Loss: None observed  Fluid Accumulation/Edema: Mild (likely related to kidney function)  Malnutrition Diagnosis: Does not meet 2 of the necessary criteria to qualify for malnutrition    Previous Goals   1. Patient will verbalize understanding of 3 important aspects of post-transplant diet guidelines. - NOT MET  2. PO intake >50% meals TID. - NOT MET    Previous Nutrition Diagnosis  Food and nutrition-related knowledge deficit   Evaluation: Improving    CURRENT NUTRITION DIAGNOSIS  Inadequate oral intake related to abdominal fullness, poor appetite, nausea as evidenced by minimal PO intake since transplant on 8/3/19    INTERVENTIONS  Implementation  Medical food supplement therapy - adjusted, see above    Discussed high protein needs, sources.  Discussed  recommendations for a heart healthy diet and food safety precautions.  Patient appeared uncomfortable during discussion, therefore unsure of retention of information.  Reminded patient re: patient fridge on 7C if family brings in food from home.  Family not present during discussion.  Discussed alternative oral supplements and ordered patient a lunch meal (an omelet, cheerios, lactaid milk, Boost Plus).  Discussed appropriate light food choices and ordered her lunch meal with her.     Goals  Patient to consume at least 900 kcal, 40 grams of protein daily (60% needs) or consume at least 75% of meals TID (or the equivalent with supplements)    Monitoring/Evaluation  Progress toward goals will be monitored and evaluated per protocol.    Nancy Vaca MS, RD, LD  Pager [phone]

## 2019-08-08 NOTE — DISCHARGE SUMMARY
"[facility], [place]    Discharge Summary  Transplant Surgery    Date of Admission:  2019  Date of Discharge:  2019  Discharging Provider: Angie Durbin MD / Ondina Deleon NP     Discharge Diagnoses   Principal Problem:    Kidney transplanted  Active Problems:    HTN (hypertension)    Immunosuppressed status (H)    Nausea    Delayed graft function of kidney    Methemoglobinemia    Kidney replaced by transplant    Anxiety      History of Present Illness   Mona Wagner is an 28 year old female with history of ESKD secondary to IgA nephropathy, SAH  while on warfarin complicated by seizure, hypothyroidism, chronic ITP, and hyperprolactinemia secondary to pituitary adenoma. Underwent a DCD  donor kidney transplant with three arteries and ureteral stent on 8/3/19.     Hospital Course   Kidney transplant: DCD kidney with delayed graft function. Required dialysis on POD 0, 1, 4, 6. Renogram on 8/10 consistent with ATN. Urine volumes increased greatly in the days prior to discharge. Creatinine continues to rise, but no indication for dialysis as of day of discharge.    Immunosuppression:  Induction immunosuppression with thymoglobulin 325mg (6mg/kg) and steroid taper 500mg intra-op/250mg/100mg. Maintenance immunosuppression with tacrolimus 5mg BID (increased  from 4.5) and mycophenolate 750 mg BID. Tacrolimus level 5.1 . Tacrolimus goal level 8-10 (12 hour trough). Infectious prophylaxis with valganciclovir (x12 weeks) and ordered pentamidine neb (d/c'd dapsone 2/2 methemoglobinemia).    Transplant coordinator Laurence \"Melani\"Taylor  [phone]  Donor type:  DCD  DSA at time of transplant:  NO  Ureteral stent: YES  CMV:  Donor + / Recipient +  EBV:  Donor - / Recipient +  Thymoglobulin:  325mg, 6mg/kg    Psych:  Anxiety: Surrounding health, psychiatry consulted. Health psychology also consulted.     Hematology:   Anemia: HGB 6.7 on day of discharge. Iron replete. " Transfused on  and .   Methemoglobinemia:  patient with blue discoloration of lips, shortness of breath, methemoglobin level 10.1. Discontinued Dapsone.      Cardiorespiratory:  Chest pressure: Repeated episodes. POD1 Difficulty breathing, chest fullness, leg tightness in evening. BNP elevated, weight up otherwise labs, troponin, EKG, BLE US negative. Improved with dialysis. Onset again on , work up negative. Again improved with dialysis. Continue aspirin 81 (graft with 3 arteries), atorvastatin, and amlodipine.     GI/Nutrition:   Nausea: Onset post-op for first ~6 days. Improving and able to eat at time of discharge.   Post-op constipation: Resolved prior to discharge. Miralax PRN.  Diet: Poor oral intake post-op, improving toward discharge.     Endocrine:   History of hypothyroidism: Continue Levothyroxine.     Fluid/Electrolytes:   Hyperkalemia post-op: K 6.4 post-op, required emergent dialysis. K now normal.   Hypervolemia: Discharge on bumex BID.  Hyponatremia: D/t hypervolemia, monitor.     :   Bladder spasms: Resolved prior to discharge.  Vaginal yeast infection: Patient reported symptoms and discharge, but refused miconazole vaginal suppositories.     Infectious disease:   R/o UTI: Dysuria starting . UC negative at that time. Repeat today with small LE, WBC 3. UC pending.    Musculoskeletal:   Myoclonus jerks: Patient noticed leg jerks within minutes of falling asleep. Likely secondary to tacrolimus. Will monitor.      Significant Results and Procedures    Procedure date:  19    Preoperative diagnosis:  End Stage renal failure due to IgA nephropathy    Postoperative diagnosis:  Same    Procedure:  1. Left kidney transplant,   - Cardiac Death, Right  iliac fossa, with arterial reconstruction. A J-J ureteral stent was placed.  2. Kidney allograft preparation on Back Table    Surgeon:  ANDI CHO       Code Status   Full    Primary Care Physician   Macarena Mejia  Andrés    Physical Exam   Temp: (P) 98.9  F (37.2  C) Temp src: (P) Oral BP: (P) 136/86 Pulse: (P) 99 Heart Rate: 103 Resp: (P) 18 SpO2: (P) 99 % O2 Device: (P) None (Room air) Oxygen Delivery: 1 LPM  Vitals:    08/09/19 0939 08/11/19 0556 08/12/19 0355   Weight: 58.8 kg (129 lb 10.1 oz) 57.7 kg (127 lb 3.2 oz) 58.2 kg (128 lb 4.8 oz)     Vital Signs with Ranges  Temp:  [97.7  F (36.5  C)-98.9  F (37.2  C)] (P) 98.9  F (37.2  C)  Pulse:  [99] (P) 99  Heart Rate:  [] 103  Resp:  [16-18] (P) 18  BP: (138-156)/(82-99) (P) 136/86  SpO2:  [96 %-100 %] (P) 99 %  I/O last 3 completed shifts:  In: 120 [P.O.:120]  Out: 400 [Urine:400]      Time Spent on this Encounter   I, Ondina Deleon NP, personally saw the patient today and spent greater than 30 minutes discharging this patient.    Discharge Disposition   Discharged to home  Condition at discharge: Stable    Consultations This Hospital Stay   NEPHROLOGY KIDNEY/PANCREAS TRANSPLANT ADULT IP CONSULT  ADVANCE DIRECTIVE IP CONSULT  VASCULAR ACCESS CARE ADULT IP CONSULT  NEPHROLOGY KIDNEY/PANCREAS TRANSPLANT ADULT IP CONSULT  PHARMACY IP CONSULT  SOT MEDICATION HISTORY IP PHARMACY CONSULT  SOCIAL WORK IP CONSULT  PHARMACY IP CONSULT  NUTRITION SERVICES ADULT IP CONSULT  PHARMACY IP CONSULT    Discharge Orders       Home care nursing referral      Psychology Referral      MD face to face encounter    Documentation of Face to Face and Certification for Home Health Services    I certify that patient: Mona Wagner is under my care and that I, or a nurse practitioner or physician's assistant working with me, had a face-to-face encounter that meets the physician face-to-face encounter requirements with this patient on: 8/5/2019.    This encounter with the patient was in whole, or in part, for the following medical condition, which is the primary reason for home health care: s/p kidney transplant.    I certify that, based on my findings, the following services are medically  necessary home health services: Nursing.    My clinical findings support the need for the above services because: Nurse is needed: To provide assessment and oversight required in the home to assure adherence to the medical plan due to: s/p kidney transplant.    Further, I certify that my clinical findings support that this patient is homebound (i.e. absences from home require considerable and taxing effort and are for medical reasons or [employer] services or infrequently or of short duration when for other reasons) because: Leaving home is medically contraindicated for the following reason(s): Infection risk / immunocompromised state where it is safer for them to receive services in the home...    Based on the above findings. I certify that this patient is confined to the home and needs intermittent skilled nursing care, physical therapy and/or speech therapy.  The patient is under my care, and I have initiated the establishment of the plan of care.  This patient will be followed by a physician who will periodically review the plan of care.  Physician/Provider to provide follow up care: Macarena Bowen    Attending hospital physician (the Medicare certified [place] provider): Dorian Johnson MD  Physician Signature: See electronic signature associated with these discharge orders.  Date: 8/5/2019     Reason for your hospital stay    Kidney replaced by transplant     Adult [facility] Follow-up and recommended labs and tests    1. [facility] ([address] [place] [address])  Starting Tuesday 8/13.  Please bring all medications, lab book, and medication card with you.  Do not take your morning dose of tacrolimus until after labs are drawn.    2. Labs daily in ATC and then every Monday, Thursday: BMP, CBC, tacrolimus level. Magnesium and phosphorus weekly.  3. Dr. Johnson, Transplant Clinic, 8/19  4. Ureteral stent removal in 4-6 weeks, to be scheduled by Transplant Coordinator  5. Health Psychology for  anxiety management  6. Pentamidine neb to be given in ATC this week as ordered.    Appointments on [place] and/or [facility] (with [facility] or [facility] provider or service). Call [phone] if you haven't heard regarding these appointments within 7 days of discharge.     Activity    Your activity upon discharge: No lifting greater than 10 pounds for at least 6 weeks, no driving while taking narcotic pain medications, avoid bath tubs, hot tubs, and swimming for at least 3 weeks.     When to contact your care team    Notify your coordinator if you have pain over your kidney, fever greater than 100.5F, redness or drainage at incision, or decreased urine output.    Notify your coordinator immediately if you are ever unable to take your immunosuppressive medications for any reason.    Transplant Coordinator Melani [phone]     Wound care and dressings    Instructions to care for your wound at home: keep wound clean and dry and may get incision wet in shower but do not soak or scrub.     Diet    Follow this diet upon discharge: regular     Discharge Medications    Current Discharge Medication List      START taking these medications    Details   amLODIPine (NORVASC) 10 MG tablet Take 1 tablet (10 mg) by mouth daily  Qty: 30 tablet, Refills: 5    Associated Diagnoses: Essential hypertension      aspirin (ASA) 81 MG chewable tablet Take 1 tablet (81 mg) by mouth daily  Qty: 30 tablet, Refills: 5    Associated Diagnoses: Kidney replaced by transplant      atorvastatin (LIPITOR) 10 MG tablet Take 1 tablet (10 mg) by mouth daily  Qty: 30 tablet, Refills: 11    Associated Diagnoses: Kidney replaced by transplant      bumetanide (BUMEX) 2 MG tablet Take 2 tablets (4 mg) by mouth 2 times daily for 5 days  Qty: 20 tablet, Refills: 0    Associated Diagnoses: Kidney replaced by transplant      HYDROmorphone (DILAUDID) 2 MG tablet Take 0.5 tablets (1 mg) by mouth every 4 hours as needed for moderate to severe pain  Qty: 5 tablet,  Refills: 0    Associated Diagnoses: Kidney replaced by transplant      hydrOXYzine (ATARAX) 10 MG tablet Take 1 tablet (10 mg) by mouth 3 times daily as needed for anxiety  Qty: 20 tablet, Refills: 0    Associated Diagnoses: Anxiety      mycophenolate (GENERIC EQUIVALENT) 250 MG capsule Take 3 capsules (750 mg) by mouth 2 times daily  Qty: 180 capsule, Refills: 11    Associated Diagnoses: Kidney replaced by transplant      pentamidine (NEBUPENT) 300 MG neb solution Inhale 300 mg into the lungs every 28 days    Associated Diagnoses: Kidney transplanted      tacrolimus (GENERIC EQUIVALENT) 0.5 MG capsule Take 1 cap by mouth twice daily as directed by Transplant Center for dose adjustment  Qty: 30 capsule, Refills: 0    Associated Diagnoses: Kidney replaced by transplant      tacrolimus (GENERIC EQUIVALENT) 1 MG capsule Take 5 capsules (5 mg) by mouth 2 times daily  Qty: 300 capsule, Refills: 11    Associated Diagnoses: Kidney replaced by transplant      valGANciclovir (VALCYTE) 450 MG tablet Take 1 tab every Monday and Thursday. Titrate dose up to a max of 2 tabs (900 mg) by mouth daily when directed by your transplant team.  Qty: 60 tablet, Refills: 2    Associated Diagnoses: Kidney replaced by transplant         CONTINUE these medications which have CHANGED    Details   norethindrone (MICRONOR) 0.35 MG tablet Do not restart until your follow up appointment with your surgeon. Discuss restart date at that appointment.    Associated Diagnoses: Surveillance of previously prescribed contraceptive pill      polyethylene glycol (MIRALAX/GLYCOLAX) powder Take 17 g (1 capful) by mouth daily as needed for constipation  Qty: 500 g, Refills: 3    Associated Diagnoses: Chronic idiopathic constipation         CONTINUE these medications which have NOT CHANGED    Details   acetaminophen (TYLENOL) 325 MG tablet Take 2 tablets (650 mg) by mouth every 4 hours as needed for mild pain  Qty: 100 tablet, Refills: 3    Associated  Diagnoses: Back pain, unspecified back location, unspecified back pain laterality, unspecified chronicity      levothyroxine (SYNTHROID/LEVOTHROID) 25 MCG tablet Take 1 tablet (25 mcg) Tuesday, Thursday, Saturday and Sunday and 1.5 tablets (37.5 mcg) on Monday, Wednesday and Friday every week.  Qty: 105 tablet, Refills: 1    Comments: PHARMACY- DISPENSE 3 MONTH SUPPLY  Associated Diagnoses: Acquired hypothyroidism      multivitamin RENAL (NEPHROCAPS/TRIPHROCAPS) 1 MG capsule Take 1 capsule by mouth daily  Qty: 30 capsule, Refills: 3    Associated Diagnoses: ESRD needing dialysis (H)      sevelamer (RENVELA) 800 MG tablet Take 1600 mg by mouth three times daily with meals AND Take 800 mg by mouth with snacks.      simethicone (MYLICON) 125 MG chewable tablet Take 1 tablet (125 mg) by mouth 4 times daily as needed for intestinal gas    Associated Diagnoses: Bloated abdomen      diphenhydrAMINE (BENADRYL) 25 MG tablet Take 1-2 tablets (25-50 mg) by mouth every 6 hours as needed for itching or allergies  Qty: 60 tablet, Refills: 1    Associated Diagnoses: Anaphylaxis, subsequent encounter      docusate sodium (COLACE) 100 MG tablet Take 1 tablet (100 mg) by mouth daily as needed for constipation    Associated Diagnoses: Constipation, unspecified constipation type      EPINEPHrine (EPIPEN/ADRENACLICK/OR ANY BX GENERIC EQUIV) 0.3 MG/0.3ML injection 2-pack Inject 0.3 mLs (0.3 mg) into the muscle as needed for anaphylaxis  Qty: 0.6 mL, Refills: 3    Associated Diagnoses: Anaphylaxis, subsequent encounter      ondansetron (ZOFRAN ODT) 4 MG ODT tab Take 1-2 tablets (4-8 mg) by mouth every 8 hours as needed for nausea  Qty: 60 tablet, Refills: 1    Associated Diagnoses: Nausea         STOP taking these medications       calcium carbonate 750 MG CHEW Comments:   Reason for Stopping:         cinacalcet (SENSIPAR) 30 MG tablet Comments:   Reason for Stopping:         EMLA cream Comments:   Reason for Stopping:          HYDROcodone-acetaminophen (NORCO) 5-325 MG tablet Comments:   Reason for Stopping:         Lactobacillus Rhamnosus, GG, (CULTURELLE HEALTH & WELLNESS) capsule Comments:   Reason for Stopping:             Allergies   Allergies   Allergen Reactions     Chlorhexidine Rash     Rash at site     Sulfa Drugs Rash     Muscle stiffness of neck     Furosemide Other (See Comments)     Skin flushing     Lisinopril Swelling     angioedema     Alcohol Swabs [Isopropyl Alcohol] Rash     Data   Most Recent 3 CBC's:  Recent Labs   Lab Test 08/12/19  0619 08/11/19  0606 08/10/19  0603   WBC 10.1 8.1 6.3   HGB 6.7* 7.1* 7.3*   MCV 96 99 98    168 120*      Most Recent 3 BMP's:  Recent Labs   Lab Test 08/12/19  0619 08/11/19  0606 08/10/19  0603   * 133 133   POTASSIUM 4.2 4.0 4.3   CHLORIDE 97 98 100   CO2 23 22 26   BUN 62* 46* 31*   CR 9.56* 7.97* 5.64*   ANIONGAP 9 13 7   SHAMIR 9.6 9.8 9.8   GLC 85 81 82     Most Recent 2 LFT's:  Recent Labs   Lab Test 08/02/19  1938 01/15/19  1231 01/06/19  1350   AST 4  --  8   ALT 13  --  14   ALKPHOS 65  --  63   BILITOTAL 0.8 0.9 0.7     Most Recent INR's and Anticoagulation Dosing History:  Anticoagulation Dose History     Recent Dosing and Labs Latest Ref Rng & Units 9/1/2014 9/9/2014 9/15/2014 12/29/2016 2/21/2017 3/16/2017 8/2/2019    INR 0.86 - 1.14 2.6 3.00 3.3 Canceled, Test credited  Unsatisfactory specimen - clotted  Notified Dr. Jericho Brown @ 7285 12/29/16 by BC  CORRECTED ON 12/29 AT 1453: PREVIOUSLY REPORTED AS 0.92  0.93 - 0.94    INR Point of Care 0.86 - 1.14 - - - - - <0.9 -        Most Recent 3 Troponin's:  Recent Labs   Lab Test 08/07/19  1715 08/04/19  1900 01/06/19  1350   TROPI <0.015 0.019 <0.015     Most Recent 6 Bacteria Isolates From Any Culture (See EPIC Reports for Culture Details):  Recent Labs   Lab Test 08/12/19  1030 08/08/19  1515 05/15/13  1951   CULT PENDING <10,000 colonies/mL  urogenital anita  Susceptibility testing not routinely done   >100,000  colonies/mL Escherichia coli   I have reviewed history, examined patient and discussed plan with the fellow/resident/MAR.    I concur with the findings in this note.    Time spent on discharge activities: 45 minutes.

## 2019-08-08 NOTE — PLAN OF CARE
VS: Afebrile, -140's/90's, HR 90's, NSR sometimes tachycardic, 92-94% on room air.  LDA: Left arm AV fistula intact, right internal jugular TLC saline locked.  Neuro: A&Ox4.  GI/: Oliguric, small soft/loose BM x2.  Diet/appetite: Regular diet, poor PO intake.  Activity: Encourage ambulation 4 times a day.  Pain/Nausea/Vomiting: Dilaudid PO given x1 and scheduled Tylenol for incisional pain with relief.  Skin: Surgical incision intact.  Mobility: SBA and walker.  Test/Procedure: Ultrasound of bilateral LE done at about 2300 at PM shift.  Plan: Continue plan of cares.

## 2019-08-08 NOTE — PROGRESS NOTES
Calorie Count  Intake recorded for: 8/7  Total Kcals: 0 Total Protein: 0g  Kcals from Hospital Food: 0   Protein: 0g  Kcals from Outside Food (average):0 Protein: 0g  # Meals Recorded: 1 meal ordered from kitchen, no intake recorded.   # Supplements Recorded: no intake recorded.

## 2019-08-08 NOTE — PROGRESS NOTES
Transplant Surgery  Inpatient Daily Progress Note  08/08/2019    Assessment & Plan: 27yo woman with history of ESKD 2/2 IgA nephropathy on HD since 12/2013 via LUE AVF MWF. Other PMH includes SAH in 2014 while on Coumadin and subsequent seizure x2, hypothyroidism, chronic ITP, hyperprolactinemia 2/2 to pituitary adenoma. S/p DCD DDKT with 3 renal arteries on 8/3/19.     Graft function: POD #5  Kidney: Cr 6.8 after dialysis. UOP remains low.   -POD0 K 6.4, improved s/p dialysis.  -POD1 dialysis for hypervolemia, hypertension, tachypneic  -POD4 dialysis d/t hypervolemia and nausea  -8/8 US: pending     Immunosuppression management:   Thymoglobulin 100/100/125, for a total of 325 mg or 6mg/kg.  Solu-Medrol 500mg intra-op (not documented)/250mg/100mg  Tacrolimus 2mg BID   Cellcept 750mg BID     Hematology:   Anemia: HGB 7.7 after RBC transfusion 8/6.   Thrombocytopenia: D/t thymo. Monitor.     Cardiorespiratory:  Chest pressure: Repeated episodes. POD1 Difficulty breathing, chest fullness, leg tightness in evening. BNP elevated, weight up otherwise labs, troponin, EKG, BLE US negative. Improved with dialysis. Onset again on 8/7, work up negative. Again improved with dialysis.  Aspirin 81 (graft with 3 arteries)  Atorvastatin 10mg  Amlodipine 10mg  Hydralazine PRN     GI/Nutrition:   Nausea: Seems improved. Pt now able to eat. Stop compazine d/t sedation.  Constipation: Increase miralax, continue senna.  Diet: Starting to eat. Monitor.    Endocrine:   Steroid hyperglycemia: Resolved.  Hypothyroidism: Levothyroxine 37.5 mcg     Fluid/Electrolytes:   Hyperkalemia post-op: K 6.4 post-op, required emergent dialysis. K now normal.   Hypervolemia: Dialyzed 8/7.     :   Bladder spasms: B&O suppository PRN, Ditropan PRN  Vaginal yeast infection: Pt reports symptoms and discharge. Will start miconazole suppositories.     Infectious disease:   No acute issues     Prophylaxis:   IS, SCDs, dapsone (G6PD normal),  valcyte     Disposition: 7A      Medical Decision Making: Medium  Subsequent visit 79898 (moderate level decision making)    Resident: Ondina Deleon NP     Faculty: Dr. Michel  _________________________________________________________________  Transplant History: Admitted 8/2/2019 for kidney transplant.  8/3/2019 (Kidney), Postoperative day: 5     Interval History:  BM x2 with enema yesterday. Still feels constipated. Some nausea. Nurses reporting that pt is anxious.    ROS:   A 10-point review of systems was negative except as noted above.    Meds:    acetaminophen  975 mg Oral Q8H     amLODIPine  10 mg Oral Daily     aspirin  81 mg Oral Daily     atorvastatin  10 mg Oral Daily     cinacalcet  30 mg Oral Daily with supper     dapsone  100 mg Oral Daily     levothyroxine  37.5 mcg Oral Q Mon Wed Fri AM     levothyroxine  25 mcg Oral Once per day on Sun Tue Thu Sat     miconazole  100 mg Vaginal At Bedtime     multivitamin RENAL  1 capsule Oral Daily     mycophenolate  750 mg Oral BID IS     polyethylene glycol  17 g Oral Daily     senna-docusate  2 tablet Oral BID     sevelamer  2,400 mg Oral TID w/meals     sodium chloride (PF)  10 mL Intracatheter Q8H     tacrolimus  3 mg Oral BID IS     valGANciclovir  450 mg Oral Once per day on Mon Thu       Physical Exam:     Admit Weight: 54 kg (119 lb 0.8 oz)  Today's weight: 129 lbs 4.8 oz    Vital sign ranges:    Temp:  [97.9  F (36.6  C)-98.5  F (36.9  C)] 98.1  F (36.7  C)  Pulse:  [] 103  Heart Rate:  [] 105  Resp:  [18-20] 18  BP: (129-154)/() 137/97  SpO2:  [92 %-97 %] 92 %    GENERAL: NAD  SKIN: No jaundice. No rashes or lesions.   HEENT: Eyes symmetrical and free of discharge bilaterally.  CV: well perfused  RESPIRATORY: Respirations regular, even, and unlabored  GI: Distended, tympanic, soft. Incision with dermabond, C/D/I, EARL drain with serosanguinous output.  : No brooks  EXTREMITIES: +1 BLE edema. L AVF   NEUROLOGIC: Alert and orientated  x 4. No focal deficits.   MUSCULOSKELETAL: No joint swelling or tenderness.      Data:   CMP  Recent Labs   Lab 08/08/19  0643 08/07/19  0541  08/03/19  1047 08/02/19 1938   * 132*   < > 131* 134   POTASSIUM 3.8 4.3   < > 4.1 3.6   CHLORIDE 98 98   < >  --  91*   CO2 24 20   < >  --  35*   GLC 85 90   < > 111* 82   BUN 47* 78*   < >  --  22   CR 6.83* 9.60*   < >  --  6.11*   GFRESTIMATED 8* 5*   < >  --  9*   GFRESTBLACK 9* 6*   < >  --  10*   SHAMIR 8.5 8.8   < >  --  9.8   ICAW  --   --   --  5.0  --    MAG 2.2 2.7*   < >  --   --    PHOS 4.6* 6.4*   < >  --   --    ALBUMIN  --   --   --   --  4.2   BILITOTAL  --   --   --   --  0.8   ALKPHOS  --   --   --   --  65   AST  --   --   --   --  4   ALT  --   --   --   --  13    < > = values in this interval not displayed.     CBC  Recent Labs   Lab 08/08/19  0643 08/07/19  0541  08/03/19  1240   HGB 7.7* 7.9*   < > 8.5*   WBC 5.3 5.5   < > 9.9   PLT 73* 70*   < > 113*   A1C  --   --   --  4.8    < > = values in this interval not displayed.     COAGS  Recent Labs   Lab 08/02/19 1938   INR 0.94   PTT 33      Urinalysis  Recent Labs   Lab Test 12/11/13  1915 12/10/13  2050 05/15/13  1951   COLOR  --  Straw Dark Yellow   APPEARANCE  --  Clear Slightly Cloudy   URINEGLC  --  70* Negative   URINEBILI  --  Negative Negative   URINEKETONE  --  Negative Negative   SG  --  1.008 1.011   UBLD  --  Small* Moderate*   URINEPH  --  6.5 6.0   PROTEIN  --  100* 300*   NITRITE  --  Negative Negative   LEUKEST  --  Negative Large*   RBCU  --  2 19*   WBCU  --  9* 151*   UTPG 7.42*  --   --

## 2019-08-09 PROBLEM — D74.9 METHEMOGLOBINEMIA: Status: ACTIVE | Noted: 2019-08-09

## 2019-08-09 LAB
ANION GAP SERPL CALCULATED.3IONS-SCNC: 12 MMOL/L (ref 3–14)
BACTERIA SPEC CULT: NORMAL
BUN SERPL-MCNC: 66 MG/DL (ref 7–30)
CALCIUM SERPL-MCNC: 9.6 MG/DL (ref 8.5–10.1)
CHLORIDE SERPL-SCNC: 96 MMOL/L (ref 94–109)
CO2 SERPL-SCNC: 22 MMOL/L (ref 20–32)
CREAT SERPL-MCNC: 9.23 MG/DL (ref 0.52–1.04)
DEPRECATED CALCIDIOL+CALCIFEROL SERPL-MC: 16 UG/L (ref 20–75)
ERYTHROCYTE [DISTWIDTH] IN BLOOD BY AUTOMATED COUNT: 13 % (ref 10–15)
GFR SERPL CREATININE-BSD FRML MDRD: 5 ML/MIN/{1.73_M2}
GLUCOSE SERPL-MCNC: 80 MG/DL (ref 70–99)
HCT VFR BLD AUTO: 24.5 % (ref 35–47)
HGB BLD-MCNC: 7.9 G/DL (ref 11.7–15.7)
LACTATE BLD-SCNC: 0.5 MMOL/L (ref 0.7–2)
MAGNESIUM SERPL-MCNC: 2.5 MG/DL (ref 1.6–2.3)
MCH RBC QN AUTO: 31 PG (ref 26.5–33)
MCHC RBC AUTO-ENTMCNC: 32.2 G/DL (ref 31.5–36.5)
MCV RBC AUTO: 96 FL (ref 78–100)
METHGB MFR BLD: 10.1 % (ref 0–3)
PHOSPHATE SERPL-MCNC: 6 MG/DL (ref 2.5–4.5)
PLATELET # BLD AUTO: 88 10E9/L (ref 150–450)
POTASSIUM SERPL-SCNC: 3.8 MMOL/L (ref 3.4–5.3)
PTH-INTACT SERPL-MCNC: 358 PG/ML (ref 18–80)
RBC # BLD AUTO: 2.55 10E12/L (ref 3.8–5.2)
SODIUM SERPL-SCNC: 130 MMOL/L (ref 133–144)
SPECIMEN SOURCE: NORMAL
TACROLIMUS BLD-MCNC: 3.8 UG/L (ref 5–15)
TME LAST DOSE: ABNORMAL H
WBC # BLD AUTO: 7.2 10E9/L (ref 4–11)

## 2019-08-09 PROCEDURE — 83605 ASSAY OF LACTIC ACID: CPT

## 2019-08-09 PROCEDURE — 85027 COMPLETE CBC AUTOMATED: CPT | Performed by: STUDENT IN AN ORGANIZED HEALTH CARE EDUCATION/TRAINING PROGRAM

## 2019-08-09 PROCEDURE — 25000131 ZZH RX MED GY IP 250 OP 636 PS 637: Mod: GY | Performed by: SURGERY

## 2019-08-09 PROCEDURE — 83050 HGB METHEMOGLOBIN QUAN: CPT | Performed by: NURSE PRACTITIONER

## 2019-08-09 PROCEDURE — 25000132 ZZH RX MED GY IP 250 OP 250 PS 637: Mod: GY | Performed by: NURSE PRACTITIONER

## 2019-08-09 PROCEDURE — 90937 HEMODIALYSIS REPEATED EVAL: CPT

## 2019-08-09 PROCEDURE — 36592 COLLECT BLOOD FROM PICC: CPT | Performed by: SURGERY

## 2019-08-09 PROCEDURE — 25000131 ZZH RX MED GY IP 250 OP 636 PS 637: Mod: GY | Performed by: NURSE PRACTITIONER

## 2019-08-09 PROCEDURE — 83970 ASSAY OF PARATHORMONE: CPT | Performed by: STUDENT IN AN ORGANIZED HEALTH CARE EDUCATION/TRAINING PROGRAM

## 2019-08-09 PROCEDURE — 25000128 H RX IP 250 OP 636: Performed by: INTERNAL MEDICINE

## 2019-08-09 PROCEDURE — 25000132 ZZH RX MED GY IP 250 OP 250 PS 637: Mod: GY | Performed by: SURGERY

## 2019-08-09 PROCEDURE — 25000128 H RX IP 250 OP 636: Performed by: STUDENT IN AN ORGANIZED HEALTH CARE EDUCATION/TRAINING PROGRAM

## 2019-08-09 PROCEDURE — 82306 VITAMIN D 25 HYDROXY: CPT | Performed by: STUDENT IN AN ORGANIZED HEALTH CARE EDUCATION/TRAINING PROGRAM

## 2019-08-09 PROCEDURE — 12000026 ZZH R&B TRANSPLANT

## 2019-08-09 PROCEDURE — 93005 ELECTROCARDIOGRAM TRACING: CPT

## 2019-08-09 PROCEDURE — 25000132 ZZH RX MED GY IP 250 OP 250 PS 637: Mod: GY | Performed by: STUDENT IN AN ORGANIZED HEALTH CARE EDUCATION/TRAINING PROGRAM

## 2019-08-09 PROCEDURE — 84100 ASSAY OF PHOSPHORUS: CPT | Performed by: STUDENT IN AN ORGANIZED HEALTH CARE EDUCATION/TRAINING PROGRAM

## 2019-08-09 PROCEDURE — 83735 ASSAY OF MAGNESIUM: CPT | Performed by: STUDENT IN AN ORGANIZED HEALTH CARE EDUCATION/TRAINING PROGRAM

## 2019-08-09 PROCEDURE — 80048 BASIC METABOLIC PNL TOTAL CA: CPT | Performed by: STUDENT IN AN ORGANIZED HEALTH CARE EDUCATION/TRAINING PROGRAM

## 2019-08-09 PROCEDURE — 80197 ASSAY OF TACROLIMUS: CPT | Performed by: SURGERY

## 2019-08-09 PROCEDURE — 93010 ELECTROCARDIOGRAM REPORT: CPT | Performed by: INTERNAL MEDICINE

## 2019-08-09 RX ORDER — CALCIUM CARBONATE 500 MG/1
500 TABLET, CHEWABLE ORAL 3 TIMES DAILY PRN
Status: DISCONTINUED | OUTPATIENT
Start: 2019-08-09 | End: 2019-08-12 | Stop reason: HOSPADM

## 2019-08-09 RX ORDER — ACETAMINOPHEN 325 MG/1
975 TABLET ORAL EVERY 8 HOURS PRN
Status: DISCONTINUED | OUTPATIENT
Start: 2019-08-09 | End: 2019-08-12 | Stop reason: HOSPADM

## 2019-08-09 RX ORDER — CIPROFLOXACIN 500 MG/1
500 TABLET, FILM COATED ORAL
Status: DISCONTINUED | OUTPATIENT
Start: 2019-08-10 | End: 2019-08-11

## 2019-08-09 RX ORDER — HYDRALAZINE HYDROCHLORIDE 25 MG/1
25 TABLET, FILM COATED ORAL EVERY 6 HOURS PRN
Status: DISCONTINUED | OUTPATIENT
Start: 2019-08-09 | End: 2019-08-12 | Stop reason: HOSPADM

## 2019-08-09 RX ADMIN — ACETAMINOPHEN 975 MG: 325 TABLET, FILM COATED ORAL at 10:59

## 2019-08-09 RX ADMIN — MYCOPHENOLATE MOFETIL 750 MG: 250 CAPSULE ORAL at 07:55

## 2019-08-09 RX ADMIN — Medication: at 14:52

## 2019-08-09 RX ADMIN — ONDANSETRON 4 MG: 2 INJECTION INTRAMUSCULAR; INTRAVENOUS at 23:59

## 2019-08-09 RX ADMIN — CALCIUM CARBONATE (ANTACID) CHEW TAB 500 MG 500 MG: 500 CHEW TAB at 23:58

## 2019-08-09 RX ADMIN — SIMETHICONE CHEW TAB 80 MG 80 MG: 80 TABLET ORAL at 13:23

## 2019-08-09 RX ADMIN — DAPSONE 100 MG: 100 TABLET ORAL at 07:57

## 2019-08-09 RX ADMIN — Medication 37.5 MCG: at 07:55

## 2019-08-09 RX ADMIN — Medication 2 MG: at 05:47

## 2019-08-09 RX ADMIN — TACROLIMUS 3 MG: 1 CAPSULE ORAL at 07:55

## 2019-08-09 RX ADMIN — SIMETHICONE CHEW TAB 80 MG 80 MG: 80 TABLET ORAL at 20:33

## 2019-08-09 RX ADMIN — Medication 2 MG: at 13:19

## 2019-08-09 RX ADMIN — SODIUM CHLORIDE 250 ML: 9 INJECTION, SOLUTION INTRAVENOUS at 14:22

## 2019-08-09 RX ADMIN — SODIUM CHLORIDE 300 ML: 9 INJECTION, SOLUTION INTRAVENOUS at 14:20

## 2019-08-09 RX ADMIN — SEVELAMER CARBONATE 800 MG: 800 TABLET, FILM COATED ORAL at 20:30

## 2019-08-09 RX ADMIN — ACETAMINOPHEN 975 MG: 325 TABLET, FILM COATED ORAL at 03:25

## 2019-08-09 RX ADMIN — AMLODIPINE BESYLATE 10 MG: 10 TABLET ORAL at 07:57

## 2019-08-09 RX ADMIN — ASPIRIN 81 MG CHEWABLE TABLET 81 MG: 81 TABLET CHEWABLE at 07:56

## 2019-08-09 RX ADMIN — ONDANSETRON 4 MG: 2 INJECTION INTRAMUSCULAR; INTRAVENOUS at 08:02

## 2019-08-09 RX ADMIN — SENNOSIDES AND DOCUSATE SODIUM 1 TABLET: 8.6; 5 TABLET ORAL at 07:57

## 2019-08-09 RX ADMIN — SENNOSIDES AND DOCUSATE SODIUM 1 TABLET: 8.6; 5 TABLET ORAL at 20:30

## 2019-08-09 RX ADMIN — HYDROXYZINE HYDROCHLORIDE 10 MG: 10 TABLET ORAL at 21:49

## 2019-08-09 RX ADMIN — MYCOPHENOLATE MOFETIL 750 MG: 250 CAPSULE ORAL at 19:06

## 2019-08-09 RX ADMIN — ACETAMINOPHEN 975 MG: 325 TABLET, FILM COATED ORAL at 20:31

## 2019-08-09 RX ADMIN — ATORVASTATIN CALCIUM 10 MG: 10 TABLET, FILM COATED ORAL at 07:56

## 2019-08-09 RX ADMIN — SEVELAMER CARBONATE 2400 MG: 800 TABLET, FILM COATED ORAL at 07:56

## 2019-08-09 RX ADMIN — TACROLIMUS 4.5 MG: 1 CAPSULE ORAL at 19:06

## 2019-08-09 RX ADMIN — ONDANSETRON 4 MG: 2 INJECTION INTRAMUSCULAR; INTRAVENOUS at 14:51

## 2019-08-09 RX ADMIN — Medication 1 CAPSULE: at 07:56

## 2019-08-09 RX ADMIN — SEVELAMER CARBONATE 2400 MG: 800 TABLET, FILM COATED ORAL at 13:19

## 2019-08-09 ASSESSMENT — PAIN DESCRIPTION - DESCRIPTORS: DESCRIPTORS: ACHING

## 2019-08-09 ASSESSMENT — ACTIVITIES OF DAILY LIVING (ADL)
ADLS_ACUITY_SCORE: 13

## 2019-08-09 ASSESSMENT — MIFFLIN-ST. JEOR: SCORE: 1249.5

## 2019-08-09 NOTE — PROGRESS NOTES
CLINICAL NUTRITION SERVICES     Nutrition Prescription    Recommendations already ordered by Registered Dietitian (RD):  Reordered calorie counts 8/10-8/12  Supplements: modified to 2 Gelatein Plus (pineapple) daily    Future/Additional Recommendations:  1. Monitor adequacy of PO intake and supplement tolerance.  2. Monitor need for additional nutrition education       NEW FINDINGS   Calorie Counts:   8/7         Total Kcals: 0           Total Protein: 0g  8/8         Total Kcals: 681       Total Protein: 41g    Met with pt today who reports that she consumed half of a Boost Plus yesterday, reprots she had abdominal cramping after consuming the Boost; pt reports lactose sensitivity, Boost Plus is lactose free. Pt had gelatein supplement in room at time of visit, but had not tried it yet. Pt agreeable to modifying supplement regimen to 2 Gelatein Plus daily due to not tolerating Boost Plus well.     Pt denied questions about post transplant education and recalled heart healthy diet recommendations as well as food safety guidelines, however pt was tired and nauseas during visit. RD will continue to monitor need for additional education/address pt's questions/concerns.     Interventions  Medical food supplement therapy: Gelatein Plus (pineapple) BID, provides 300 kcal, 40 g protein daily (assuming 100% consumption of 2 Gelatein).     Jaspal Grider MS, RD, LD  7A/6D Weekday Pager [phone]

## 2019-08-09 NOTE — PROGRESS NOTES
Transplant Surgery  Inpatient Daily Progress Note  08/09/2019    Assessment & Plan: 27yo woman with history of ESKD 2/2 IgA nephropathy on HD since 12/2013 via LUE AVF MWF. Other PMH includes SAH in 2014 while on Coumadin and subsequent seizure x2, hypothyroidism, chronic ITP, hyperprolactinemia 2/2 to pituitary adenoma. S/p DCD DDKT with 3 renal arteries on 8/3/19.     Graft function: POD #6. DGF.  Kidney: Cr 6.8->9.2. UOP remains low.   -POD0 K 6.4, improved s/p dialysis.  -POD1 dialysis for hypervolemia, hypertension, tachypneic  -POD4 dialysis d/t hypervolemia and nausea  -8/8 US: patent doppler, elevated RIs.  -8/9 renogram: pending     Immunosuppression management:   Thymoglobulin 100/100/125, for a total of 325 mg or 6mg/kg.  Solu-Medrol 500mg intra-op (not documented)/250mg/100mg  Tacrolimus 2mg BID   Cellcept 750mg BID     Hematology:   Anemia: HGB 7.7->7.9. Last RBC transfusion 8/6.   Thrombocytopenia: D/t thymo. Monitor.     Cardiorespiratory:  Chest pressure: Repeated episodes. POD1 Difficulty breathing, chest fullness, leg tightness in evening. BNP elevated, weight up otherwise labs, troponin, EKG, BLE US negative. Improved with dialysis. Onset again on 8/7, work up negative. Again improved with dialysis.  Aspirin 81 (graft with 3 arteries)  Atorvastatin 10mg  Amlodipine 10mg  Hydralazine PRN     GI/Nutrition:   Nausea: Seems improved. Pt now able to eat. Continue zofran.  Constipation: Continue miralax, continue senna.  Diet: Starting to eat. Kcal 681, improved.    Endocrine:   Steroid hyperglycemia: Resolved.  Hypothyroidism: Levothyroxine 37.5 mcg     Fluid/Electrolytes:   Hyperkalemia post-op: K 6.4 post-op, required emergent dialysis. K now normal.   Hypervolemia: Dialyzed 8/7.  Hyponatremia: D/t hypervolemia.     :   Bladder spasms: B&O suppository PRN, Ditropan PRN  Vaginal yeast infection: Pt reports symptoms and discharge. On miconazole suppositories.     Infectious disease: Tmax 99.7F  R/o  UTI: Dysuria and pyuria on UA (also RBC, but has menses). UC pending.     Prophylaxis:   IS, SCDs, dapsone (G6PD normal), valcyte     Disposition: 7A      Medical Decision Making: Medium  Subsequent visit 62070 (moderate level decision making)    Resident: Ondina Deleon NP [id number]    Faculty: Dr. Michel  _________________________________________________________________  Transplant History: Admitted 8/2/2019 for kidney transplant.  8/3/2019 (Kidney), Postoperative day: 6     Interval History:  BMx3. Some menstrual cramping. Urinary urgency.    ROS:   A 10-point review of systems was negative except as noted above.    Meds:    amLODIPine  10 mg Oral Daily     aspirin  81 mg Oral Daily     atorvastatin  10 mg Oral Daily     dapsone  100 mg Oral Daily     levothyroxine  37.5 mcg Oral Q Mon Wed Fri AM     levothyroxine  25 mcg Oral Once per day on Sun Tue Thu Sat     miconazole  100 mg Vaginal At Bedtime     multivitamin RENAL  1 capsule Oral Daily     mycophenolate  750 mg Oral BID IS     polyethylene glycol  17 g Oral BID     senna-docusate  2 tablet Oral BID     sevelamer  2,400 mg Oral TID w/meals     sodium chloride (PF)  10 mL Intracatheter Q8H     tacrolimus  3 mg Oral BID IS     valGANciclovir  450 mg Oral Once per day on Mon Thu       Physical Exam:     Admit Weight: 54 kg (119 lb 0.8 oz)  Today's weight: 129 lbs 10.09 oz    Vital sign ranges:    Temp:  [98.1  F (36.7  C)-99.7  F (37.6  C)] 98.4  F (36.9  C)  Pulse:  [99] 99  Heart Rate:  [] 109  Resp:  [16-18] 18  BP: (131-160)/() 160/101  SpO2:  [91 %-93 %] 93 %    GENERAL: NAD  SKIN: No jaundice. No rashes or lesions.   HEENT: Eyes symmetrical and free of discharge bilaterally.  CV: well perfused  RESPIRATORY: Respirations regular, even, and unlabored  GI: Less distended, soft. Incision with dermabond, C/D/I  : No brooks  EXTREMITIES: trace BLE edema. L AVF   NEUROLOGIC: Alert and orientated x 4. No focal deficits.   MUSCULOSKELETAL: No joint  swelling or tenderness.      Data:   CMP  Recent Labs   Lab 08/09/19  0559 08/08/19  0643  08/03/19  1047 08/02/19 1938   * 132*   < > 131* 134   POTASSIUM 3.8 3.8   < > 4.1 3.6   CHLORIDE 96 98   < >  --  91*   CO2 22 24   < >  --  35*   GLC 80 85   < > 111* 82   BUN 66* 47*   < >  --  22   CR 9.23* 6.83*   < >  --  6.11*   GFRESTIMATED 5* 8*   < >  --  9*   GFRESTBLACK 6* 9*   < >  --  10*   SHAMIR 9.6 8.5   < >  --  9.8   ICAW  --   --   --  5.0  --    MAG 2.5* 2.2   < >  --   --    PHOS 6.0* 4.6*   < >  --   --    ALBUMIN  --   --   --   --  4.2   BILITOTAL  --   --   --   --  0.8   ALKPHOS  --   --   --   --  65   AST  --   --   --   --  4   ALT  --   --   --   --  13    < > = values in this interval not displayed.     CBC  Recent Labs   Lab 08/09/19  0559 08/08/19  0643  08/03/19  1240   HGB 7.9* 7.7*   < > 8.5*   WBC 7.2 5.3   < > 9.9   PLT 88* 73*   < > 113*   A1C  --   --   --  4.8    < > = values in this interval not displayed.     COAGS  Recent Labs   Lab 08/02/19 1938   INR 0.94   PTT 33      Urinalysis  Recent Labs   Lab Test 08/08/19  1515 12/11/13  1915 12/10/13  2050   COLOR Red  --  Straw   APPEARANCE Cloudy  --  Clear   URINEGLC Negative  --  70*   URINEBILI Negative  --  Negative   URINEKETONE Negative  --  Negative   SG 1.015  --  1.008   UBLD Large*  --  Small*   URINEPH 6.0  --  6.5   PROTEIN 300*  --  100*   NITRITE Negative  --  Negative   LEUKEST Moderate*  --  Negative   RBCU >182*  --  2   WBCU 9*  --  9*   UTPG  --  7.42*  --

## 2019-08-09 NOTE — PLAN OF CARE
VS: Afebrile, BP `140-150's/90's, HR 90's, O2 sat 92% on room air.  LDA: Left arm AV fistula intact, right internal jugular TLC saline locked, right PIV saline locked.  Neuro: A&Ox4.  GI/: Oliguric, medium loose BM x1.  Diet/appetite: Regular diet, fair PO intake.  Activity: Encourage ambulation 4 times a day.  Pain/Nausea/Vomiting: Dilaudid PO  given x2 and scheduled Tylenol for abdominal/incisional pain with relief.  Skin: Surgical incision with liquid bandage c/d/i  Mobility: SBA and walker.  Test/Procedure: N/A  Plan: Continue plan of cares.

## 2019-08-09 NOTE — PROGRESS NOTES
[facility]  Referral received via Care Transitions team for home care services. Patient's service address is in our [facility] service area. Patient will receive services from [facility]. All patient demographics and orders will be sent to [facility]. Care team notified. For any questions or concerns regarding home care services please call [phone].

## 2019-08-09 NOTE — PLAN OF CARE
"BP (!) 142/97 (BP Location: Right arm)   Pulse 99   Temp 98.4  F (36.9  C) (Oral)   Resp 18   Ht 1.524 m (5')   Wt 58.8 kg (129 lb 10.1 oz)   SpO2 93%   BMI 25.32 kg/m      Hypertensive, tachycardic, OVSS on room air. Pain managed with prn 2 mg dilaudid given x1, prn tylenol, and simethicone x1. Zofran given x1 for intermittent nausea. Tolerating regular diet with fair appetite. No BM this shift, but per patient had several loose stools overnight. Passing gas. Urinated 50 ml this shift. Internal jugular saline locked. PIV saline locked. Abdominal incision with staples EMILY. Ambulated in hallway with SBA and walker. Patient reported feeling \"light headed & dizzy\" after walk. Was able to sit up in chair for 2 hours this shift. Transferred to dialysis at 1330. Plan to have NM renogram today. Will continue POC and notify team with any changes in status.   "

## 2019-08-09 NOTE — PROGRESS NOTES
[facility], [place]   Transplant Nephrology Progress Note  Date of Admission:  2019  Today's Date: 2019     Assessment & Plan    # DDKT: baseline Cr ~ TBD; DCD complicated by DGF              - Proteinuria: Not checked post transplant              - Date DSA Last Checked:Pending    -We will dialyze again today, at this point patient should follow 3 times weekly dialysis until the allograft function has been established.     # Immunosuppression: usual standard induction    Tacrolimus immediate release (goal 8-10) and Mycophenolate mofetil (goal 1-3.5)              - Changes: No     # Prophylaxis:              - PJP: Dapsone, complicated by methemoglobinemia.  Please discontinue dapsone and consider pentamadine inhalation prior to discharge.              - CMV: Valcyte              - Thrush: None     # Hypertension: Controlled; Goal BP: < 140/90              - Volume status:  hypervolemic              - Changes: No     # Anemia in Chronic Renal Disease: Hgb: Stable      CHARLINE: No              - Iron studies: Not checked recently     # Mineral Bone Disorder:   - Secondary renal hyperparathyroidism; PTH level is elevated at 358 pg/mL   - Vitamin D deficiency consider starting D3 2000 units daily.  Hyperphosphatemia: PhosLo with meals        # Transplant History:  Etiology of kidney failure: IgA nephropathy  Tx: DDKT  Transplant: 8/3/2019 (Kidney)  Donor Type:  - Cardiac Death        Donor Class: Standard Criteria Donor  Crossmatch at time of Tx: negative  Significant changes in immunosuppression: None  Significant transplant-related complications: None     Recommendations were communicated to the primary team via this note  Dean Wilson MD  Pager: [phone]    Interval History     Patient had slightly worse at night than the night before and she attributed this to volume overload.  She feels bloated with difficulty to take full breath.  She had no fevers or chills.  Still  having hard time walking around due to leg swelling.  She would like to proceed with dialysis which is our plan for today.    Review of Systems   4 point ROS was obtained and negative except as noted in the Interval History.    Physical Exam   Temp  Av.3  F (36.8  C)  Min: 97.6  F (36.4  C)  Max: 98.9  F (37.2  C)      Pulse  Av.1  Min: 67  Max: 129 Resp  Av.1  Min: 10  Max: 22  SpO2  Av.7 %  Min: 91 %  Max: 100 %    CVP (mmHg): 7 mmHgBP (!) 152/98 (BP Location: Right arm)   Pulse 99   Temp 98.4  F (36.9  C) (Oral)   Resp 18   Ht 1.524 m (5')   Wt 58.7 kg (129 lb 4.8 oz)   SpO2 92%   BMI 25.25 kg/m     Date 19 0700 - 19 0659   Shift 7387-4974 3632-0048 7622-3716 24 Hour Total   INTAKE   I.V. 30   30   Shift Total(mL/kg) 30(0.56)   30(0.56)   OUTPUT   Shift Total(mL/kg)       Weight (kg) 54 54 54 54      Admit Weight: 54 kg (119 lb 0.8 oz)   GENERAL APPEARANCE: alert and no distress  HENT: mouth without ulcers or lesions  LYMPHATICS: no cervical or supraclavicular nodes  RESP: lungs clear to auscultation - no rales, rhonchi or wheezes  CV: regular rhythm, normal rate, no rub, no murmur  EDEMA: 2+ LE edema bilaterally  ABDOMEN: soft, nondistended, nontender, bowel sounds normal  MS: extremities normal - no gross deformities noted, no evidence of inflammation in joints, no muscle tenderness  SKIN: no rash    Data   All labs reviewed by me.  CMP  Recent Labs   Lab 19  0559 19  0643 19  0541 19  0545  19  1938   * 132* 132* 133   < > 134   POTASSIUM 3.8 3.8 4.3 4.2   < > 3.6   CHLORIDE 96 98 98 99   < > 91*   CO2 22 24 20 23   < > 35*   ANIONGAP 12 10 14 11   < > 8   GLC 80 85 90 85   < > 82   BUN 66* 47* 78* 45*   < > 22   CR 9.23* 6.83* 9.60* 7.35*   < > 6.11*   GFRESTIMATED 5* 8* 5* 7*   < > 9*   GFRESTBLACK 6* 9* 6* 8*   < > 10*   SHAMIR 9.6 8.5 8.8 9.2   < > 9.8   MAG 2.5* 2.2 2.7* 2.1   < >  --    PHOS 6.0* 4.6* 6.4* 5.8*   < >  --    PROTTOTAL   --   --   --   --   --  8.6   ALBUMIN  --   --   --   --   --  4.2   BILITOTAL  --   --   --   --   --  0.8   ALKPHOS  --   --   --   --   --  65   AST  --   --   --   --   --  4   ALT  --   --   --   --   --  13    < > = values in this interval not displayed.     CBC  Recent Labs   Lab 08/09/19  0559 08/08/19  0643 08/07/19  0541 08/06/19  2206 08/06/19  0545   HGB 7.9* 7.7* 7.9* 8.4* 6.9*   WBC 7.2 5.3 5.5  --  6.3   RBC 2.55* 2.48* 2.50*  --  2.18*   HCT 24.5* 24.2* 24.3*  --  21.8*   MCV 96 98 97  --  100   MCH 31.0 31.0 31.6  --  31.7   MCHC 32.2 31.8 32.5  --  31.7   RDW 13.0 13.2 13.4  --  13.1   PLT 88* 73* 70*  --  79*     INR  Recent Labs   Lab 08/02/19  1938   INR 0.94   PTT 33     ABG  Recent Labs   Lab 08/03/19  1047   O2PER 40      Urine Studies  Recent Labs   Lab Test 08/08/19  1515 12/10/13  2050 05/15/13  1951   COLOR Red Straw Dark Yellow   APPEARANCE Cloudy Clear Slightly Cloudy   URINEGLC Negative 70* Negative   URINEBILI Negative Negative Negative   URINEKETONE Negative Negative Negative   SG 1.015 1.008 1.011   UBLD Large* Small* Moderate*   URINEPH 6.0 6.5 6.0   PROTEIN 300* 100* 300*   NITRITE Negative Negative Negative   LEUKEST Moderate* Negative Large*   RBCU >182* 2 19*   WBCU 9* 9* 151*     Recent Labs   Lab Test 12/11/13  1915   UTPG 7.42*     PTH  Recent Labs   Lab Test 08/09/19  0559 12/11/13  0601   PTHI 358* 1,131*     Iron Studies  Recent Labs   Lab Test 12/29/16  1338 12/11/13  0600   IRON 137 65   * 245   IRONSAT 60* 26   MONET 1,039*  --        IMAGING:  All imaging studies reviewed by me.     MEDICATIONS:  Current Facility-Administered Medications   Medication     acetaminophen (TYLENOL) tablet 975 mg     amLODIPine (NORVASC) tablet 10 mg     aspirin (ASA) chewable tablet 81 mg     atorvastatin (LIPITOR) tablet 10 mg     bisacodyl (DULCOLAX) Suppository 10 mg     dapsone (ACZONE) tablet 100 mg     docusate sodium (COLACE) capsule 100 mg     hydrALAZINE (APRESOLINE)  injection 10 mg     HYDROmorphone (DILAUDID) half-tab 1-2 mg     hydrOXYzine (ATARAX) tablet 10 mg     levothyroxine (SYNTHROID/LEVOTHROID) half-tab 37.5 mcg     levothyroxine (SYNTHROID/LEVOTHROID) tablet 25 mcg     lidocaine (LMX4) cream     lidocaine (XYLOCAINE) 5 % ointment     lidocaine 1 % 0.1-1 mL     miconazole (MICATIN) vaginal suppository 100 mg     multivitamin RENAL (NEPHROCAPS/TRIPHROCAPS) capsule 1 capsule     mycophenolate (GENERIC EQUIVALENT) capsule 750 mg     naloxone (NARCAN) injection 0.1-0.4 mg     ondansetron (ZOFRAN) injection 4 mg     opium-belladonna (B&O SUPPRETTES) 30-16.2 MG per suppository 0.5 suppository     polyethylene glycol (MIRALAX/GLYCOLAX) Packet 17 g     senna-docusate (SENOKOT-S/PERICOLACE) 8.6-50 MG per tablet 2 tablet     sevelamer (RENVELA) tablet 2,400 mg     simethicone (MYLICON) chewable tablet 80 mg     sodium chloride (PF) 0.9% PF flush 10 mL     sodium chloride (PF) 0.9% PF flush 10-20 mL     tacrolimus (GENERIC EQUIVALENT) capsule 3 mg     valGANciclovir (VALCYTE) tablet 450 mg

## 2019-08-09 NOTE — PROGRESS NOTES
HEMODIALYSIS TREATMENT NOTE    Date: 8/9/2019  Time: 4:04 PM    Data:  Pre Wt: 58.8 kg (133 lb 2.5 oz)   Desired Wt: 56.3 kg   Post Wt:   Weight change:  kg  Ultrafiltration - Post Run Net Total Removed (mL): 2500 mL  Vascular Access Status: patent  Dialyzer Rinse: Streaked  Total Blood Volume Processed:  L   Total Dialysis (Treatment) Time: 3.5    Lab:   Yes. Methglobin sent    Interventions:  Handoff given to Katrin LEI RN w/60 min left. Planned 29 yo HD 3.5 hrs; UFG 3L. Pt requested to increase UFG from 2.5 to 3L and MD approved and extended HD time from 3 hrs to 3.5 hrs. See MAR for medications. VSS through out HD. Pt self cannulated 15G buttonhole/blunt w/o issues.     Assessment:       Plan:

## 2019-08-09 NOTE — PROGRESS NOTES
Calorie Count  Intake recorded for: 8/8  Total Kcals: 681 Total Protein: 41g  Kcals from Hospital Food: 681  Protein: 41g  Kcals from Outside Food (average):0 Protein: 0g  # Meals Recorded: 2 meals (First - 100% apple juice, chicken breast, white rice, 10 crackers)      (Second - 33% cheerios w/ lactose free milk, less than 25% banana bread, peoples omelet)  # Supplements Recorded: 50% 1 Boost Plus

## 2019-08-09 NOTE — PROGRESS NOTES
"HEMODIALYSIS TREATMENT NOTE    Date: 8/9/2019  Time: 1730    Data:  Pre Wt: 58.8 kg   Desired Wt: 56.3 kg   Post Wt:  declined  Weight change:   Ultrafiltration - Post Run Net Total Removed (mL): 2000 mL  Vascular Access Status: patent  Dialyzer Rinse: Light  Total Blood Volume Processed: 80.74 L  Total Dialysis (Treatment) Time: 3 hours 15 minutes     Lab: No    Assessment/Interventions: Assumed care of pt from Matilde WADSWORTH. See Matilde WADSWORTH treatment note for first 2.5 hrs of run.  Pt request to end treatment 15 minutes early 2/2 feeling \"tight/dry\".  Blood flow 400-450 mL/min via left arm fistula.  2 L removed.  Pt given a total of 300 mL NS for c/o nausea and pre-cramping sensation.  Pt tachycardic through out run, but improved once UF off.  Report called.       Plan:  Continue with plan of care.  Next run per Renal Team.      "

## 2019-08-10 ENCOUNTER — APPOINTMENT (OUTPATIENT)
Dept: NUCLEAR MEDICINE | Facility: CLINIC | Age: 29
DRG: 652 | End: 2019-08-10
Attending: NURSE PRACTITIONER
Payer: MEDICARE

## 2019-08-10 LAB
ANION GAP SERPL CALCULATED.3IONS-SCNC: 7 MMOL/L (ref 3–14)
BUN SERPL-MCNC: 31 MG/DL (ref 7–30)
CALCIUM SERPL-MCNC: 9.8 MG/DL (ref 8.5–10.1)
CHLORIDE SERPL-SCNC: 100 MMOL/L (ref 94–109)
CO2 SERPL-SCNC: 26 MMOL/L (ref 20–32)
CREAT SERPL-MCNC: 5.64 MG/DL (ref 0.52–1.04)
ERYTHROCYTE [DISTWIDTH] IN BLOOD BY AUTOMATED COUNT: 13.2 % (ref 10–15)
GFR SERPL CREATININE-BSD FRML MDRD: 10 ML/MIN/{1.73_M2}
GLUCOSE SERPL-MCNC: 82 MG/DL (ref 70–99)
HCT VFR BLD AUTO: 23.1 % (ref 35–47)
HGB BLD-MCNC: 7.3 G/DL (ref 11.7–15.7)
MAGNESIUM SERPL-MCNC: 1.9 MG/DL (ref 1.6–2.3)
MCH RBC QN AUTO: 30.9 PG (ref 26.5–33)
MCHC RBC AUTO-ENTMCNC: 31.6 G/DL (ref 31.5–36.5)
MCV RBC AUTO: 98 FL (ref 78–100)
METHGB MFR BLD: 3.5 % (ref 0–3)
PHOSPHATE SERPL-MCNC: 4.4 MG/DL (ref 2.5–4.5)
PLATELET # BLD AUTO: 120 10E9/L (ref 150–450)
POTASSIUM SERPL-SCNC: 4.3 MMOL/L (ref 3.4–5.3)
RBC # BLD AUTO: 2.36 10E12/L (ref 3.8–5.2)
SODIUM SERPL-SCNC: 133 MMOL/L (ref 133–144)
WBC # BLD AUTO: 6.3 10E9/L (ref 4–11)

## 2019-08-10 PROCEDURE — 25000132 ZZH RX MED GY IP 250 OP 250 PS 637: Mod: GY | Performed by: NURSE PRACTITIONER

## 2019-08-10 PROCEDURE — 12000026 ZZH R&B TRANSPLANT

## 2019-08-10 PROCEDURE — 25000132 ZZH RX MED GY IP 250 OP 250 PS 637: Mod: GY | Performed by: STUDENT IN AN ORGANIZED HEALTH CARE EDUCATION/TRAINING PROGRAM

## 2019-08-10 PROCEDURE — 25000131 ZZH RX MED GY IP 250 OP 636 PS 637: Mod: GY | Performed by: NURSE PRACTITIONER

## 2019-08-10 PROCEDURE — 78707 K FLOW/FUNCT IMAGE W/O DRUG: CPT

## 2019-08-10 PROCEDURE — 25000132 ZZH RX MED GY IP 250 OP 250 PS 637: Mod: GY | Performed by: SURGERY

## 2019-08-10 PROCEDURE — 25000128 H RX IP 250 OP 636: Performed by: STUDENT IN AN ORGANIZED HEALTH CARE EDUCATION/TRAINING PROGRAM

## 2019-08-10 PROCEDURE — 34300033 ZZH RX 343: Performed by: TRANSPLANT SURGERY

## 2019-08-10 PROCEDURE — 25000131 ZZH RX MED GY IP 250 OP 636 PS 637: Mod: GY | Performed by: SURGERY

## 2019-08-10 PROCEDURE — A9562 TC99M MERTIATIDE: HCPCS | Performed by: TRANSPLANT SURGERY

## 2019-08-10 PROCEDURE — 83735 ASSAY OF MAGNESIUM: CPT | Performed by: STUDENT IN AN ORGANIZED HEALTH CARE EDUCATION/TRAINING PROGRAM

## 2019-08-10 PROCEDURE — 85027 COMPLETE CBC AUTOMATED: CPT | Performed by: STUDENT IN AN ORGANIZED HEALTH CARE EDUCATION/TRAINING PROGRAM

## 2019-08-10 PROCEDURE — 80048 BASIC METABOLIC PNL TOTAL CA: CPT | Performed by: STUDENT IN AN ORGANIZED HEALTH CARE EDUCATION/TRAINING PROGRAM

## 2019-08-10 PROCEDURE — 83050 HGB METHEMOGLOBIN QUAN: CPT | Performed by: STUDENT IN AN ORGANIZED HEALTH CARE EDUCATION/TRAINING PROGRAM

## 2019-08-10 PROCEDURE — 84100 ASSAY OF PHOSPHORUS: CPT | Performed by: STUDENT IN AN ORGANIZED HEALTH CARE EDUCATION/TRAINING PROGRAM

## 2019-08-10 PROCEDURE — 36592 COLLECT BLOOD FROM PICC: CPT | Performed by: STUDENT IN AN ORGANIZED HEALTH CARE EDUCATION/TRAINING PROGRAM

## 2019-08-10 RX ORDER — AMOXICILLIN 250 MG
2 CAPSULE ORAL 2 TIMES DAILY PRN
Status: DISCONTINUED | OUTPATIENT
Start: 2019-08-10 | End: 2019-08-12 | Stop reason: HOSPADM

## 2019-08-10 RX ORDER — POLYETHYLENE GLYCOL 3350 17 G/17G
17 POWDER, FOR SOLUTION ORAL 2 TIMES DAILY PRN
Status: DISCONTINUED | OUTPATIENT
Start: 2019-08-10 | End: 2019-08-12 | Stop reason: HOSPADM

## 2019-08-10 RX ORDER — HYDROXYZINE HYDROCHLORIDE 25 MG/1
25 TABLET, FILM COATED ORAL 3 TIMES DAILY PRN
Status: DISCONTINUED | OUTPATIENT
Start: 2019-08-10 | End: 2019-08-11

## 2019-08-10 RX ORDER — BUMETANIDE 0.25 MG/ML
4 INJECTION INTRAMUSCULAR; INTRAVENOUS ONCE
Status: COMPLETED | OUTPATIENT
Start: 2019-08-10 | End: 2019-08-10

## 2019-08-10 RX ADMIN — HYDRALAZINE HYDROCHLORIDE 15 MG: 25 TABLET ORAL at 18:54

## 2019-08-10 RX ADMIN — Medication 1 CAPSULE: at 08:30

## 2019-08-10 RX ADMIN — DOCUSATE SODIUM 100 MG: 100 CAPSULE, LIQUID FILLED ORAL at 18:49

## 2019-08-10 RX ADMIN — Medication 2 MG: at 00:39

## 2019-08-10 RX ADMIN — HYDROXYZINE HYDROCHLORIDE 10 MG: 10 TABLET ORAL at 11:47

## 2019-08-10 RX ADMIN — ONDANSETRON 4 MG: 2 INJECTION INTRAMUSCULAR; INTRAVENOUS at 14:10

## 2019-08-10 RX ADMIN — MYCOPHENOLATE MOFETIL 750 MG: 250 CAPSULE ORAL at 18:49

## 2019-08-10 RX ADMIN — ACETAMINOPHEN 975 MG: 325 TABLET, FILM COATED ORAL at 11:43

## 2019-08-10 RX ADMIN — MYCOPHENOLATE MOFETIL 750 MG: 250 CAPSULE ORAL at 08:30

## 2019-08-10 RX ADMIN — Medication 1 MG: at 10:23

## 2019-08-10 RX ADMIN — ASPIRIN 81 MG CHEWABLE TABLET 81 MG: 81 TABLET CHEWABLE at 08:30

## 2019-08-10 RX ADMIN — TACROLIMUS 4.5 MG: 1 CAPSULE ORAL at 08:29

## 2019-08-10 RX ADMIN — ACETAMINOPHEN 975 MG: 325 TABLET, FILM COATED ORAL at 03:30

## 2019-08-10 RX ADMIN — HYDROXYZINE HYDROCHLORIDE 10 MG: 10 TABLET ORAL at 17:11

## 2019-08-10 RX ADMIN — TACROLIMUS 4.5 MG: 1 CAPSULE ORAL at 18:49

## 2019-08-10 RX ADMIN — ATORVASTATIN CALCIUM 10 MG: 10 TABLET, FILM COATED ORAL at 08:33

## 2019-08-10 RX ADMIN — SENNOSIDES AND DOCUSATE SODIUM 1 TABLET: 8.6; 5 TABLET ORAL at 08:31

## 2019-08-10 RX ADMIN — POLYETHYLENE GLYCOL 3350 17 G: 17 POWDER, FOR SOLUTION ORAL at 03:30

## 2019-08-10 RX ADMIN — SIMETHICONE CHEW TAB 80 MG 80 MG: 80 TABLET ORAL at 14:19

## 2019-08-10 RX ADMIN — LEVOTHYROXINE SODIUM 25 MCG: 25 TABLET ORAL at 08:31

## 2019-08-10 RX ADMIN — Medication 9 MILLICURIE: at 09:00

## 2019-08-10 RX ADMIN — CALCIUM CARBONATE (ANTACID) CHEW TAB 500 MG 500 MG: 500 CHEW TAB at 14:19

## 2019-08-10 RX ADMIN — BUMETANIDE 4 MG: 0.25 INJECTION INTRAMUSCULAR; INTRAVENOUS at 10:40

## 2019-08-10 RX ADMIN — ACETAMINOPHEN 975 MG: 325 TABLET, FILM COATED ORAL at 21:55

## 2019-08-10 RX ADMIN — HYDROXYZINE HYDROCHLORIDE 10 MG: 10 TABLET ORAL at 04:14

## 2019-08-10 RX ADMIN — CIPROFLOXACIN HYDROCHLORIDE 500 MG: 500 TABLET, FILM COATED ORAL at 08:30

## 2019-08-10 RX ADMIN — Medication 2 MG: at 18:58

## 2019-08-10 RX ADMIN — AMLODIPINE BESYLATE 10 MG: 10 TABLET ORAL at 08:27

## 2019-08-10 ASSESSMENT — ACTIVITIES OF DAILY LIVING (ADL)
ADLS_ACUITY_SCORE: 13

## 2019-08-10 NOTE — PROGRESS NOTES
Cross-cover note: 11:14 PM 8/9/2019 08/09/2019    General Surgery Cross Cover Note    Called by nursing regarding chest pain    Per patient feeling lower substernal chest pain that started when she was out of bed sitting in chair approximately 20-30 minutes before I entered.  Pain radiates to back and is exacerbated by deep breathing.  She later sat up and indicated the pain was epigastric.  She denies, cough, diaphoresis, L arm or neck pain.  Initially not nauseated, then began to endorse slight nausea.      B/P: 158/86, T: 98.1, P: 104, R: 24    PE:  A&O x3, NAD, appears anxious   Breathing non-labored, CTAB   Heart regular, systolic murmur noted, S1 and S2 present, no S3 or S4   Abd soft, tender at epigastric area (reproduces the pain currently experiencing), non-distended  Extr. Warm to touch    Plan:  - likely GI etiology rather than cardiac   - EKG no concern for ischemia, recent troponin negative    - symptom management with antiemetics and Tums   - continue to monitor     Katherine Clifford MD  Surgery Resident PGY-1  Pg [phone]

## 2019-08-10 NOTE — PROGRESS NOTES
[facility], [place]   Transplant Nephrology Progress Note  Date of Admission:  2019  Today's Date: 08/10/2019     Assessment & Plan    # DDKT: baseline Cr ~ TBD; DCD complicated by DGF              - Proteinuria: Not checked post transplant              - Date DSA Last Checked:Pending    -HD planned thrice weekly while oligoanuric.      # Immunosuppression: usual standard induction    Tacrolimus immediate release (goal 8-10) and Mycophenolate mofetil (goal 1-3.5)              - Changes: No    Tac 3.8 ng / ml on , increased to 4.5 mg po bid from 3 mg po bid. Follow up troughs.      # Prophylaxis:              - PJP: no dapsone due to methemaglobinemia; pentamidine as OP.              - CMV: Valcyte 450 q M-Th     # Hypertension: Controlled; Goal BP: < 140/90              - Volume status:  Hypervolemic - limit fluid intake to minimize volume overload              - Changes: No    UF with HD.      # Anemia in Chronic Renal Disease: Hgb: Stable  7.3 g/ dl today.      # Mineral Bone Disorder:   - Secondary renal hyperparathyroidism; PTH level is elevated at 358 pg/mL   - Vitamin D deficiency: D3 2000 units daily.  Hyperphosphatemia: PhosLo with meals; 4.4 mg / dl today.     # Transplant History:  Etiology of kidney failure: IgA nephropathy  Tx: DDKT  Transplant: 8/3/2019 (Kidney)  Donor Type:  - Cardiac Death        Donor Class: Standard Criteria Donor  Crossmatch at time of Tx: negative  Significant changes in immunosuppression: None  Significant transplant-related complications: None     Recommendations were communicated to the primary team via this note  Deny Rios MD    Interval History     HD yesterday. No cp, sob, no n/v/, no f/s/c. No constipation or diarrhea. Tmax: 98.9. I/O: .    Review of Systems   4 point ROS was obtained and negative except as noted in the Interval History.    Physical Exam   Temp  Av.3  F (36.8  C)  Min: 97.6  F (36.4  C)  Max:  98.9  F (37.2  C)      Pulse  Av.1  Min: 67  Max: 129 Resp  Av.1  Min: 10  Max: 22  SpO2  Av.7 %  Min: 91 %  Max: 100 %    CVP (mmHg): 7 mmHgBP (!) 152/103 (BP Location: Right arm)   Pulse 95   Temp 98.3  F (36.8  C) (Oral)   Resp 20   Ht 1.524 m (5')   Wt 58.8 kg (129 lb 10.1 oz)   SpO2 93%   BMI 25.32 kg/m     Date 19 07 - 19 0659   Shift 6585-3950 5143-1961 3243-3322 24 Hour Total   INTAKE   I.V. 30   30   Shift Total(mL/kg) 30(0.56)   30(0.56)   OUTPUT   Shift Total(mL/kg)       Weight (kg) 54 54 54 54      Admit Weight: 54 kg (119 lb 0.8 oz)   GENERAL APPEARANCE: alert and no distress  HENT: mouth without ulcers or lesions  LYMPHATICS: no cervical or supraclavicular nodes  RESP: lungs clear to auscultation - no rales, rhonchi or wheezes  CV: regular rhythm, normal rate, no rub, no murmur  EDEMA: 2+ LE edema bilaterally  ABDOMEN: soft, nondistended, nontender, bowel sounds normal  MS: extremities normal - no gross deformities noted, no evidence of inflammation in joints, no muscle tenderness  SKIN: no rash    Data   All labs reviewed by me.  CMP  Recent Labs   Lab 08/10/19  0603 19  0559 19  0643 19  0541    130* 132* 132*   POTASSIUM 4.3 3.8 3.8 4.3   CHLORIDE 100 96 98 98   CO2 26 22 24 20   ANIONGAP 7 12 10 14   GLC 82 80 85 90   BUN 31* 66* 47* 78*   CR 5.64* 9.23* 6.83* 9.60*   GFRESTIMATED 10* 5* 8* 5*   GFRESTBLACK 11* 6* 9* 6*   SHAMIR 9.8 9.6 8.5 8.8   MAG 1.9 2.5* 2.2 2.7*   PHOS 4.4 6.0* 4.6* 6.4*     CBC  Recent Labs   Lab 08/10/19  0603 19  0559 19  0643 19  0541   HGB 7.3* 7.9* 7.7* 7.9*   WBC 6.3 7.2 5.3 5.5   RBC 2.36* 2.55* 2.48* 2.50*   HCT 23.1* 24.5* 24.2* 24.3*   MCV 98 96 98 97   MCH 30.9 31.0 31.0 31.6   MCHC 31.6 32.2 31.8 32.5   RDW 13.2 13.0 13.2 13.4   * 88* 73* 70*     Urine Studies  Recent Labs   Lab Test 19  1515 12/10/13  2050 05/15/13  1951   COLOR Red Straw Dark Yellow   APPEARANCE Cloudy  Clear Slightly Cloudy   URINEGLC Negative 70* Negative   URINEBILI Negative Negative Negative   URINEKETONE Negative Negative Negative   SG 1.015 1.008 1.011   UBLD Large* Small* Moderate*   URINEPH 6.0 6.5 6.0   PROTEIN 300* 100* 300*   NITRITE Negative Negative Negative   LEUKEST Moderate* Negative Large*   RBCU >182* 2 19*   WBCU 9* 9* 151*     Recent Labs   Lab Test 12/11/13  1915   UTPG 7.42*     PTH  Recent Labs   Lab Test 08/09/19  0559 12/11/13  0601   PTHI 358* 1,131*     Iron Studies  Recent Labs   Lab Test 12/29/16  1338 12/11/13  0600   IRON 137 65   * 245   IRONSAT 60* 26   MONET 1,039*  --      MEDICATIONS:  Current Facility-Administered Medications   Medication     acetaminophen (TYLENOL) tablet 975 mg     amLODIPine (NORVASC) tablet 10 mg     aspirin (ASA) chewable tablet 81 mg     atorvastatin (LIPITOR) tablet 10 mg     bisacodyl (DULCOLAX) Suppository 10 mg     calcium carbonate (TUMS) chewable tablet 500 mg     ciprofloxacin (CIPRO) tablet 500 mg     docusate sodium (COLACE) capsule 100 mg     hydrALAZINE (APRESOLINE) tablet 25 mg     HYDROmorphone (DILAUDID) half-tab 1-2 mg     hydrOXYzine (ATARAX) tablet 10 mg     levothyroxine (SYNTHROID/LEVOTHROID) half-tab 37.5 mcg     levothyroxine (SYNTHROID/LEVOTHROID) tablet 25 mcg     lidocaine (LMX4) cream     lidocaine (XYLOCAINE) 5 % ointment     lidocaine 1 % 0.1-1 mL     miconazole (MICATIN) vaginal suppository 100 mg     multivitamin RENAL (NEPHROCAPS/TRIPHROCAPS) capsule 1 capsule     mycophenolate (GENERIC EQUIVALENT) capsule 750 mg     naloxone (NARCAN) injection 0.1-0.4 mg     ondansetron (ZOFRAN) injection 4 mg     opium-belladonna (B&O SUPPRETTES) 30-16.2 MG per suppository 0.5 suppository     polyethylene glycol (MIRALAX/GLYCOLAX) Packet 17 g     senna-docusate (SENOKOT-S/PERICOLACE) 8.6-50 MG per tablet 2 tablet     sevelamer (RENVELA) tablet 2,400 mg     simethicone (MYLICON) chewable tablet 80 mg     sodium chloride (PF) 0.9% PF  flush 10 mL     sodium chloride (PF) 0.9% PF flush 10-20 mL     tacrolimus (GENERIC EQUIVALENT) capsule 4.5 mg     valGANciclovir (VALCYTE) tablet 450 mg

## 2019-08-10 NOTE — PROGRESS NOTES
Calorie Count  Intake recorded for: 8/9  Total Kcals: 361 Total Protein: 9g  Kcals from Hospital Food: 361  Protein: 9g  Kcals from Outside Food (average):0 Protein: 0g  # Meals Recorded: 100% cornflakes w/ 4 oz lactose free milk, 100% blueberry muffin, 50% white toast, 25% fresh fruit cup  # Supplements Recorded: 0

## 2019-08-10 NOTE — PLAN OF CARE
Hypertensive, anxious. Tachycardiac low 100s, mostly 90s when sleeping and calm. OVSS. Had chest pain at 0000, center of chest. MD paged and assessed patient. EKG done, noted to be normal per MD, low suspicion for cardiac related chest pain. Able to elicit pain on palpation of epigastric abdomen. No incisional pain, patient suspects may be gas pain vs nausea/stomach upset. Zofran given and tums given. Atarax given for increased anxiety x2 doses, last dose @ 0414. Patient improved.    Patient did get x1 dose of Dilaudid 2 mg PO @ 0039    Requested Miralax @ 0300 as she has been skipping out on her day doses. Notes pain with BM, appears to be soft. Regular diet, poor appetite. Eating some home foods. R TIJ SL. Voids very little blood tinged/dark red 200 over 12 hours. x2 BM overnight. Up with walker.

## 2019-08-10 NOTE — PROGRESS NOTES
Transplant Surgery  Inpatient Daily Progress Note  08/10/2019    Assessment & Plan: 27yo woman with history of ESKD 2/2 IgA nephropathy on HD since 12/2013 via LUE AVF MWF. Other PMH includes SAH in 2014 while on Coumadin and subsequent seizure x2, hypothyroidism, chronic ITP, hyperprolactinemia 2/2 to pituitary adenoma. S/p DCD DDKT with 3 renal arteries on 8/3/19.     Graft function: POD #7. DGF.  Kidney: Cr 6.8->9.2.->5.64 (had dialysis yesterday).  yesterday, slightly improved from prior. 125ccs today so far.  -POD0 K 6.4, improved s/p dialysis.  -POD1 dialysis for hypervolemia, hypertension, tachypneic  -POD4 dialysis d/t hypervolemia and nausea  -POD7 dialysis  -8/8 US: patent doppler, elevated RIs.  -8/10 renogram: pending     Immunosuppression management:   Thymoglobulin 100/100/125, for a total of 325 mg or 6mg/kg.  Solu-Medrol 500mg intra-op/250mg/100mg  Tacrolimus 4.5mg BID (increased 8/9 from 3)  Cellcept 750mg BID     Hematology:   Anemia: HGB 7.7->7.9 -> 7.3. Last RBC transfusion 8/6.   Thrombocytopenia: D/t thymo. Improving, continue to monitor.  Methemoglobinemia: 8/9 patient with blue discoloration of lips, shortness of breath, methemoglobin level 10.1. Discontinued Dapsone, placed on supplemental O2. Repeat methemoglobin level today pending, keep on O2 until methgb level is normal.    Cardiorespiratory:  Chest pressure: Repeated episodes. POD1 Difficulty breathing, chest fullness, leg tightness in evening. BNP elevated, weight up otherwise labs, troponin, EKG, BLE US negative. Improved with dialysis. Onset again on 8/7, work up negative. Again improved with dialysis.  Aspirin 81 (graft with 3 arteries)  Atorvastatin 10mg  Amlodipine 10mg  Hydralazine PRN     GI/Nutrition:   Nausea: improving. Pt now able to eat. Continue zofran.  Constipation, improved:  miralax PRN, senna PRN  Diet: Starting to eat. Kcal 361, continuing to encourage PO intake.     Endocrine:   Steroid hyperglycemia:  Resolved.  Hypothyroidism: Levothyroxine 37.5 mcg     Fluid/Electrolytes:   Hyperkalemia post-op: K 6.4 post-op, required emergent dialysis. K now normal.   Hypervolemia: Dialyzed 8/7.  Hyponatremia: D/t hypervolemia.     :   Bumex 4mg IV x1  Bladder spasms: B&O suppository PRN, Ditropan PRN  Vaginal yeast infection: Pt reports symptoms and discharge. On miconazole suppositories.     Infectious disease: Tmax 98.9F  R/o UTI: Dysuria and pyuria on UA (also RBC, but has menses). UC NGTD.     Prophylaxis:   IS, SCDs, valcyte, will need pentamidine neb (d/c'd dapsone 2/2 methemoglobinemia)     Disposition: 7A      Medical Decision Making: Medium  Subsequent visit 09844 (moderate level decision making)    Resident: Gisselle Ruggiero, PGY1    Faculty: Dr. Michel  _________________________________________________________________  Transplant History: Admitted 8/2/2019 for kidney transplant.  8/3/2019 (Kidney), Postoperative day: 7     Interval History:   Epigastric pain overnight, has improved. No nausea currently. No shortness of breath or chest pain. Reports several loose stools throughout the night.    ROS:   A 10-point review of systems was negative except as noted above.    Meds:    amLODIPine  10 mg Oral Daily     aspirin  81 mg Oral Daily     atorvastatin  10 mg Oral Daily     bumetanide  4 mg Intravenous Once     ciprofloxacin  500 mg Oral Q24H ALEJANDRO     levothyroxine  37.5 mcg Oral Q Mon Wed Fri AM     levothyroxine  25 mcg Oral Once per day on Sun Tue Thu Sat     miconazole  100 mg Vaginal At Bedtime     multivitamin RENAL  1 capsule Oral Daily     mycophenolate  750 mg Oral BID IS     sevelamer  2,400 mg Oral TID w/meals     sodium chloride (PF)  10 mL Intracatheter Q8H     tacrolimus  4.5 mg Oral BID IS     valGANciclovir  450 mg Oral Once per day on Mon Thu       Physical Exam:     Admit Weight: 54 kg (119 lb 0.8 oz)  Today's weight: 129 lbs 10.09 oz    Vital sign ranges:    Temp:  [97.9  F (36.6  C)-98.9   F (37.2  C)] 98.3  F (36.8  C)  Pulse:  [] 95  Heart Rate:  [] 99  Resp:  [18-24] 20  BP: (135-158)/() 152/103  SpO2:  [90 %-98 %] 93 %    GENERAL: NAD  SKIN: No jaundice. No rashes or lesions.   HEENT: Eyes symmetrical and free of discharge bilaterally.  CV: well perfused  RESPIRATORY: Respirations regular, even, and unlabored  GI: Less distended, soft. Incision with dermabond, C/D/I  : No brooks  EXTREMITIES: trace BLE edema. L AVF, +thrill.  NEUROLOGIC: Alert and orientated x 4. No focal deficits.   MUSCULOSKELETAL: No joint swelling or tenderness.     Data:   CMP  Recent Labs   Lab 08/10/19  0603 08/09/19  0559  08/03/19  1047    130*   < > 131*   POTASSIUM 4.3 3.8   < > 4.1   CHLORIDE 100 96   < >  --    CO2 26 22   < >  --    GLC 82 80   < > 111*   BUN 31* 66*   < >  --    CR 5.64* 9.23*   < >  --    GFRESTIMATED 10* 5*   < >  --    GFRESTBLACK 11* 6*   < >  --    SHAMIR 9.8 9.6   < >  --    ICAW  --   --   --  5.0   MAG 1.9 2.5*   < >  --    PHOS 4.4 6.0*   < >  --     < > = values in this interval not displayed.     CBC  Recent Labs   Lab 08/10/19  0603 08/09/19  0559  08/03/19  1240   HGB 7.3* 7.9*   < > 8.5*   WBC 6.3 7.2   < > 9.9   * 88*   < > 113*   A1C  --   --   --  4.8    < > = values in this interval not displayed.     COAGSNo lab results found in last 7 days.    Invalid input(s): XA   Urinalysis  Recent Labs   Lab Test 08/08/19  1515 12/11/13  1915 12/10/13  2050   COLOR Red  --  Straw   APPEARANCE Cloudy  --  Clear   URINEGLC Negative  --  70*   URINEBILI Negative  --  Negative   URINEKETONE Negative  --  Negative   SG 1.015  --  1.008   UBLD Large*  --  Small*   URINEPH 6.0  --  6.5   PROTEIN 300*  --  100*   NITRITE Negative  --  Negative   LEUKEST Moderate*  --  Negative   RBCU >182*  --  2   WBCU 9*  --  9*   UTPG  --  7.42*  --      Virology:  CMV IgG Antibody   Date Value Ref Range Status   12/26/2013 >10.00  Positive for anti-CMV IgG U/mL Final     EBV VCA IgG  Antibody   Date Value Ref Range Status   12/26/2013 57.80 U/mL Final     Comment:     Positive, suggests immunologic exposure.     Hepatitis C Antibody   Date Value Ref Range Status   08/02/2019 Nonreactive NR^Nonreactive Final     Comment:     Assay performance characteristics have not been established for newborns,   infants, and children       Hep B Surface Noemy   Date Value Ref Range Status   12/12/2013 34.4  Final     Comment:     Reactive, Patient is considered to be immune to infection with hepatitis B   when   the value is greater than or equal to 12.0 mlU/mL.

## 2019-08-10 NOTE — DISCHARGE INSTRUCTIONS
WEEKEND RN STAFF  Please call inpatient dialysis to arrange dialysis if going to discharge. [phone]. Dialysis unit aware.                 Diet recommendations post-transplant: High protein diet x 8 weeks.  Heart healthy dietary habits long term (low saturated/trans fat, low sodium). Practice food safety precautions. See nutrition handout and food safety booklet for more information.

## 2019-08-11 ENCOUNTER — APPOINTMENT (OUTPATIENT)
Dept: ULTRASOUND IMAGING | Facility: CLINIC | Age: 29
DRG: 652 | End: 2019-08-11
Attending: TRANSPLANT SURGERY
Payer: MEDICARE

## 2019-08-11 LAB
ANION GAP SERPL CALCULATED.3IONS-SCNC: 13 MMOL/L (ref 3–14)
BUN SERPL-MCNC: 46 MG/DL (ref 7–30)
CALCIUM SERPL-MCNC: 9.8 MG/DL (ref 8.5–10.1)
CHLORIDE SERPL-SCNC: 98 MMOL/L (ref 94–109)
CO2 SERPL-SCNC: 22 MMOL/L (ref 20–32)
CREAT SERPL-MCNC: 7.97 MG/DL (ref 0.52–1.04)
ERYTHROCYTE [DISTWIDTH] IN BLOOD BY AUTOMATED COUNT: 13 % (ref 10–15)
GFR SERPL CREATININE-BSD FRML MDRD: 6 ML/MIN/{1.73_M2}
GLUCOSE SERPL-MCNC: 81 MG/DL (ref 70–99)
HCT VFR BLD AUTO: 22.4 % (ref 35–47)
HGB BLD-MCNC: 7.1 G/DL (ref 11.7–15.7)
MAGNESIUM SERPL-MCNC: 2.1 MG/DL (ref 1.6–2.3)
MCH RBC QN AUTO: 31.3 PG (ref 26.5–33)
MCHC RBC AUTO-ENTMCNC: 31.7 G/DL (ref 31.5–36.5)
MCV RBC AUTO: 99 FL (ref 78–100)
METHGB MFR BLD: 3 % (ref 0–3)
PHOSPHATE SERPL-MCNC: 5.9 MG/DL (ref 2.5–4.5)
PLATELET # BLD AUTO: 168 10E9/L (ref 150–450)
POTASSIUM SERPL-SCNC: 4 MMOL/L (ref 3.4–5.3)
RBC # BLD AUTO: 2.27 10E12/L (ref 3.8–5.2)
SODIUM SERPL-SCNC: 133 MMOL/L (ref 133–144)
TACROLIMUS BLD-MCNC: 5.1 UG/L (ref 5–15)
TME LAST DOSE: NORMAL H
WBC # BLD AUTO: 8.1 10E9/L (ref 4–11)

## 2019-08-11 PROCEDURE — 25000131 ZZH RX MED GY IP 250 OP 636 PS 637: Mod: GY | Performed by: NURSE PRACTITIONER

## 2019-08-11 PROCEDURE — 25000128 H RX IP 250 OP 636: Performed by: STUDENT IN AN ORGANIZED HEALTH CARE EDUCATION/TRAINING PROGRAM

## 2019-08-11 PROCEDURE — 84100 ASSAY OF PHOSPHORUS: CPT | Performed by: STUDENT IN AN ORGANIZED HEALTH CARE EDUCATION/TRAINING PROGRAM

## 2019-08-11 PROCEDURE — 99222 1ST HOSP IP/OBS MODERATE 55: CPT

## 2019-08-11 PROCEDURE — 80197 ASSAY OF TACROLIMUS: CPT | Performed by: SURGERY

## 2019-08-11 PROCEDURE — 25000132 ZZH RX MED GY IP 250 OP 250 PS 637: Mod: GY | Performed by: SURGERY

## 2019-08-11 PROCEDURE — 85027 COMPLETE CBC AUTOMATED: CPT | Performed by: STUDENT IN AN ORGANIZED HEALTH CARE EDUCATION/TRAINING PROGRAM

## 2019-08-11 PROCEDURE — 25000132 ZZH RX MED GY IP 250 OP 250 PS 637: Mod: GY | Performed by: STUDENT IN AN ORGANIZED HEALTH CARE EDUCATION/TRAINING PROGRAM

## 2019-08-11 PROCEDURE — 83735 ASSAY OF MAGNESIUM: CPT | Performed by: STUDENT IN AN ORGANIZED HEALTH CARE EDUCATION/TRAINING PROGRAM

## 2019-08-11 PROCEDURE — 80048 BASIC METABOLIC PNL TOTAL CA: CPT | Performed by: STUDENT IN AN ORGANIZED HEALTH CARE EDUCATION/TRAINING PROGRAM

## 2019-08-11 PROCEDURE — 12000026 ZZH R&B TRANSPLANT

## 2019-08-11 PROCEDURE — 25000131 ZZH RX MED GY IP 250 OP 636 PS 637: Mod: GY | Performed by: SURGERY

## 2019-08-11 PROCEDURE — 25000131 ZZH RX MED GY IP 250 OP 636 PS 637: Mod: GY | Performed by: STUDENT IN AN ORGANIZED HEALTH CARE EDUCATION/TRAINING PROGRAM

## 2019-08-11 PROCEDURE — 93970 EXTREMITY STUDY: CPT

## 2019-08-11 PROCEDURE — 36592 COLLECT BLOOD FROM PICC: CPT | Performed by: STUDENT IN AN ORGANIZED HEALTH CARE EDUCATION/TRAINING PROGRAM

## 2019-08-11 PROCEDURE — 83050 HGB METHEMOGLOBIN QUAN: CPT | Performed by: STUDENT IN AN ORGANIZED HEALTH CARE EDUCATION/TRAINING PROGRAM

## 2019-08-11 PROCEDURE — 25000132 ZZH RX MED GY IP 250 OP 250 PS 637: Mod: GY | Performed by: NURSE PRACTITIONER

## 2019-08-11 PROCEDURE — 36592 COLLECT BLOOD FROM PICC: CPT | Performed by: SURGERY

## 2019-08-11 RX ORDER — HYDROXYZINE HYDROCHLORIDE 10 MG/1
10 TABLET, FILM COATED ORAL 2 TIMES DAILY PRN
Status: DISCONTINUED | OUTPATIENT
Start: 2019-08-11 | End: 2019-08-12 | Stop reason: HOSPADM

## 2019-08-11 RX ORDER — HYDROXYZINE HYDROCHLORIDE 25 MG/1
25 TABLET, FILM COATED ORAL
Status: DISCONTINUED | OUTPATIENT
Start: 2019-08-11 | End: 2019-08-12 | Stop reason: HOSPADM

## 2019-08-11 RX ORDER — BUMETANIDE 0.25 MG/ML
4 INJECTION INTRAMUSCULAR; INTRAVENOUS 2 TIMES DAILY
Status: COMPLETED | OUTPATIENT
Start: 2019-08-11 | End: 2019-08-11

## 2019-08-11 RX ORDER — BUMETANIDE 0.25 MG/ML
4 INJECTION INTRAMUSCULAR; INTRAVENOUS 2 TIMES DAILY
Status: DISCONTINUED | OUTPATIENT
Start: 2019-08-11 | End: 2019-08-11

## 2019-08-11 RX ORDER — TACROLIMUS 5 MG/1
5 CAPSULE ORAL
Status: DISCONTINUED | OUTPATIENT
Start: 2019-08-11 | End: 2019-08-12 | Stop reason: HOSPADM

## 2019-08-11 RX ADMIN — DOCUSATE SODIUM 100 MG: 100 CAPSULE, LIQUID FILLED ORAL at 07:51

## 2019-08-11 RX ADMIN — ATORVASTATIN CALCIUM 10 MG: 10 TABLET, FILM COATED ORAL at 07:44

## 2019-08-11 RX ADMIN — BUMETANIDE 4 MG: 0.25 INJECTION INTRAMUSCULAR; INTRAVENOUS at 19:54

## 2019-08-11 RX ADMIN — Medication 2 MG: at 15:37

## 2019-08-11 RX ADMIN — TACROLIMUS 4.5 MG: 1 CAPSULE ORAL at 07:42

## 2019-08-11 RX ADMIN — POLYETHYLENE GLYCOL 3350 17 G: 17 POWDER, FOR SOLUTION ORAL at 15:38

## 2019-08-11 RX ADMIN — HYDROXYZINE HYDROCHLORIDE 10 MG: 10 TABLET ORAL at 19:54

## 2019-08-11 RX ADMIN — Medication 2 MG: at 23:12

## 2019-08-11 RX ADMIN — CALCIUM CARBONATE (ANTACID) CHEW TAB 500 MG 500 MG: 500 CHEW TAB at 11:34

## 2019-08-11 RX ADMIN — ONDANSETRON 4 MG: 2 INJECTION INTRAMUSCULAR; INTRAVENOUS at 15:38

## 2019-08-11 RX ADMIN — MYCOPHENOLATE MOFETIL 750 MG: 250 CAPSULE ORAL at 18:09

## 2019-08-11 RX ADMIN — AMLODIPINE BESYLATE 10 MG: 10 TABLET ORAL at 07:44

## 2019-08-11 RX ADMIN — SEVELAMER CARBONATE 800 MG: 800 TABLET, FILM COATED ORAL at 07:43

## 2019-08-11 RX ADMIN — ACETAMINOPHEN 975 MG: 325 TABLET, FILM COATED ORAL at 07:44

## 2019-08-11 RX ADMIN — Medication 2 MG: at 08:45

## 2019-08-11 RX ADMIN — TACROLIMUS 5 MG: 5 CAPSULE ORAL at 18:09

## 2019-08-11 RX ADMIN — HYDROXYZINE HYDROCHLORIDE 10 MG: 10 TABLET ORAL at 11:36

## 2019-08-11 RX ADMIN — SIMETHICONE CHEW TAB 80 MG 80 MG: 80 TABLET ORAL at 01:26

## 2019-08-11 RX ADMIN — HYDROXYZINE HYDROCHLORIDE 25 MG: 25 TABLET ORAL at 01:26

## 2019-08-11 RX ADMIN — ASPIRIN 81 MG CHEWABLE TABLET 81 MG: 81 TABLET CHEWABLE at 07:44

## 2019-08-11 RX ADMIN — Medication 1 CAPSULE: at 07:43

## 2019-08-11 RX ADMIN — SENNOSIDES AND DOCUSATE SODIUM 2 TABLET: 8.6; 5 TABLET ORAL at 07:51

## 2019-08-11 RX ADMIN — SIMETHICONE CHEW TAB 80 MG 80 MG: 80 TABLET ORAL at 08:00

## 2019-08-11 RX ADMIN — ACETAMINOPHEN 975 MG: 325 TABLET, FILM COATED ORAL at 16:37

## 2019-08-11 RX ADMIN — Medication 2 MG: at 01:26

## 2019-08-11 RX ADMIN — LEVOTHYROXINE SODIUM 25 MCG: 25 TABLET ORAL at 07:44

## 2019-08-11 RX ADMIN — CIPROFLOXACIN HYDROCHLORIDE 500 MG: 500 TABLET, FILM COATED ORAL at 07:44

## 2019-08-11 RX ADMIN — MYCOPHENOLATE MOFETIL 750 MG: 250 CAPSULE ORAL at 07:42

## 2019-08-11 ASSESSMENT — ACTIVITIES OF DAILY LIVING (ADL)
ADLS_ACUITY_SCORE: 13

## 2019-08-11 ASSESSMENT — MIFFLIN-ST. JEOR: SCORE: 1238.48

## 2019-08-11 NOTE — PROGRESS NOTES
[facility], [place]   Transplant Nephrology Progress Note  Date of Admission:  2019  Today's Date: 2019     Assessment & Plan    # DDKT: baseline Cr ~ TBD; DCD complicated by DGF              - Proteinuria: Not checked post transplant              - Date DSA Last Checked:Pending    -HD decision to be made tomorrow based on UOP, symptoms and labs. UOP picking up.      # Immunosuppression: usual standard induction    Tacrolimus immediate release (goal 8-10) and Mycophenolate mofetil (goal 1-3.5)              - Changes: No    Tac 5.1 ng / ml on , increased to 4.5 mg po bid from 3 mg po bid on the . Follow up troughs.      # Prophylaxis:              - PJP: no dapsone due to methemaglobinemia; pentamidine as OP.              - CMV: Valcyte 450 q M-     # Hypertension: Controlled; Goal BP: < 140/90              - Volume status:  Hypervolemic - limit fluid intake to minimize volume overload              - Changes: No    UF with HD and agree with increased diuretics.      # Anemia in Chronic Renal Disease: Hgb: Stable  7.1 g/ dl today.      # Mineral Bone Disorder:   - Secondary renal hyperparathyroidism; PTH level is elevated at 358 pg/mL   - Vitamin D deficiency: D3 2000 units daily.  Hyperphosphatemia: PhosLo with meals; 5.9 mg / dl today.     # Transplant History:  Etiology of kidney failure: IgA nephropathy  Tx: DDKT  Transplant: 8/3/2019 (Kidney)  Donor Type:  - Cardiac Death        Donor Class: Standard Criteria Donor  Crossmatch at time of Tx: negative  Significant changes in immunosuppression: None  Significant transplant-related complications: None     Recommendations were communicated to the primary team via this note  Deny Rios MD    Interval History     HD last on friday. No cp, sob, no n/v/, no f/s/c. No constipation or diarrhea. Tmax: 99.1. I/O: 680/545 (all UOP).    Review of Systems   4 point ROS was obtained and negative except as noted in the  Interval History.    Physical Exam   Temp  Av.3  F (36.8  C)  Min: 97.6  F (36.4  C)  Max: 98.9  F (37.2  C)      Pulse  Av.1  Min: 67  Max: 129 Resp  Av.1  Min: 10  Max: 22  SpO2  Av.7 %  Min: 91 %  Max: 100 %    CVP (mmHg): 7 mmHgBP 132/78 (BP Location: Right arm)   Pulse 96   Temp 98.6  F (37  C) (Oral)   Resp 16   Ht 1.524 m (5')   Wt 57.7 kg (127 lb 3.2 oz)   SpO2 98%   BMI 24.84 kg/m     Date 19 07 - 19 0659   Shift 3351-3182 6494-4604 0021-6248 24 Hour Total   INTAKE   I.V. 30   30   Shift Total(mL/kg) 30(0.56)   30(0.56)   OUTPUT   Shift Total(mL/kg)       Weight (kg) 54 54 54 54      Admit Weight: 54 kg (119 lb 0.8 oz)   GENERAL APPEARANCE: alert and no distress  HENT: mouth without ulcers or lesions  LYMPHATICS: no cervical or supraclavicular nodes  RESP: lungs clear to auscultation - no rales, rhonchi or wheezes  CV: regular rhythm, normal rate, no rub, no murmur  EDEMA: 2+ LE edema bilaterally  ABDOMEN: soft, nondistended, nontender, bowel sounds normal  MS: extremities normal - no gross deformities noted, no evidence of inflammation in joints, no muscle tenderness  SKIN: no rash    Data   All labs reviewed by me.  CMP  Recent Labs   Lab 19  0606 08/10/19  0603 19  0559 19  0643    133 130* 132*   POTASSIUM 4.0 4.3 3.8 3.8   CHLORIDE 98 100 96 98   CO2 22 26 22 24   ANIONGAP 13 7 12 10   GLC 81 82 80 85   BUN 46* 31* 66* 47*   CR 7.97* 5.64* 9.23* 6.83*   GFRESTIMATED 6* 10* 5* 8*   GFRESTBLACK 7* 11* 6* 9*   SHAMIR 9.8 9.8 9.6 8.5   MAG 2.1 1.9 2.5* 2.2   PHOS 5.9* 4.4 6.0* 4.6*     CBC  Recent Labs   Lab 19  0606 08/10/19  0603 19  0559 19  0643   HGB 7.1* 7.3* 7.9* 7.7*   WBC 8.1 6.3 7.2 5.3   RBC 2.27* 2.36* 2.55* 2.48*   HCT 22.4* 23.1* 24.5* 24.2*   MCV 99 98 96 98   MCH 31.3 30.9 31.0 31.0   MCHC 31.7 31.6 32.2 31.8   RDW 13.0 13.2 13.0 13.2    120* 88* 73*     Urine Studies  Recent Labs   Lab Test  08/08/19  1515 12/10/13  2050 05/15/13  1951   COLOR Red Straw Dark Yellow   APPEARANCE Cloudy Clear Slightly Cloudy   URINEGLC Negative 70* Negative   URINEBILI Negative Negative Negative   URINEKETONE Negative Negative Negative   SG 1.015 1.008 1.011   UBLD Large* Small* Moderate*   URINEPH 6.0 6.5 6.0   PROTEIN 300* 100* 300*   NITRITE Negative Negative Negative   LEUKEST Moderate* Negative Large*   RBCU >182* 2 19*   WBCU 9* 9* 151*     Recent Labs   Lab Test 12/11/13  1915   UTPG 7.42*     PTH  Recent Labs   Lab Test 08/09/19  0559 12/11/13  0601   PTHI 358* 1,131*     Iron Studies  Recent Labs   Lab Test 12/29/16  1338 12/11/13  0600   IRON 137 65   * 245   IRONSAT 60* 26   MONET 1,039*  --      MEDICATIONS:  Current Facility-Administered Medications   Medication     acetaminophen (TYLENOL) tablet 975 mg     amLODIPine (NORVASC) tablet 10 mg     aspirin (ASA) chewable tablet 81 mg     atorvastatin (LIPITOR) tablet 10 mg     bisacodyl (DULCOLAX) Suppository 10 mg     bumetanide (BUMEX) injection 4 mg     calcium carbonate (TUMS) chewable tablet 500 mg     ciprofloxacin (CIPRO) tablet 500 mg     docusate sodium (COLACE) capsule 100 mg     hydrALAZINE (APRESOLINE) tablet 25 mg     HYDROmorphone (DILAUDID) half-tab 1-2 mg     hydrOXYzine (ATARAX) tablet 10 mg     hydrOXYzine (ATARAX) tablet 25 mg     levothyroxine (SYNTHROID/LEVOTHROID) half-tab 37.5 mcg     levothyroxine (SYNTHROID/LEVOTHROID) tablet 25 mcg     lidocaine (LMX4) cream     lidocaine (XYLOCAINE) 5 % ointment     lidocaine 1 % 0.1-1 mL     miconazole (MICATIN) vaginal suppository 100 mg     multivitamin RENAL (NEPHROCAPS/TRIPHROCAPS) capsule 1 capsule     mycophenolate (GENERIC EQUIVALENT) capsule 750 mg     naloxone (NARCAN) injection 0.1-0.4 mg     ondansetron (ZOFRAN) injection 4 mg     opium-belladonna (B&O SUPPRETTES) 30-16.2 MG per suppository 0.5 suppository     polyethylene glycol (MIRALAX/GLYCOLAX) Packet 17 g     senna-docusate  (SENOKOT-S/PERICOLACE) 8.6-50 MG per tablet 2 tablet     sevelamer (RENVELA) tablet 2,400 mg     simethicone (MYLICON) chewable tablet 80 mg     sodium chloride (PF) 0.9% PF flush 10 mL     sodium chloride (PF) 0.9% PF flush 10-20 mL     tacrolimus (GENERIC EQUIVALENT) capsule 5 mg     valGANciclovir (VALCYTE) tablet 450 mg

## 2019-08-11 NOTE — PROGRESS NOTES
Transplant Surgery  Inpatient Daily Progress Note  08/11/2019    Assessment & Plan: 27yo woman with history of ESKD 2/2 IgA nephropathy on HD since 12/2013 via LUE AVF MWF. Other PMH includes SAH in 2014 while on Coumadin and subsequent seizure x2, hypothyroidism, chronic ITP, hyperprolactinemia 2/2 to pituitary adenoma. S/p DCD DDKT with 3 renal arteries on 8/3/19.     Graft function: POD #8. DGF.  Kidney: Cr 6.8->9.2.->5.64 -> 7.97. UOP >545 ccs yesterday, continues to improve. 100+ ccs today so far.  -POD0 K 6.4, improved s/p dialysis.  -POD1 dialysis for hypervolemia, hypertension, tachypneic  -POD4 dialysis d/t hypervolemia and nausea  -POD7 dialysis  -8/8 US: patent doppler, elevated RIs.  -8/10 renogram: pending     Immunosuppression management:   Thymoglobulin 100/100/125, for a total of 325 mg or 6mg/kg.  Solu-Medrol 500mg intra-op/250mg/100mg  Tacrolimus 5mg BID (increased 8/11 from 4.5)  Cellcept 750mg BID     Hematology:   Anemia: HGB 7.9 -> 7.3 -> 7.1. Monitor. Last RBC transfusion 8/6.   Thrombocytopenia: D/t thymo. Improved, continue to monitor.  Methemoglobinemia: 8/9 patient with blue discoloration of lips, shortness of breath, methemoglobin level 10.1. Discontinued Dapsone, placed on supplemental O2. Methgb level today 3.     Cardiorespiratory:  Chest pressure: Repeated episodes. POD1 Difficulty breathing, chest fullness, leg tightness in evening. BNP elevated, weight up otherwise labs, troponin, EKG, BLE US negative. Improved with dialysis. Onset again on 8/7, work up negative. Again improved with dialysis. 8/11 patient with change in calf pain, BLE US pending.  Aspirin 81 (graft with 3 arteries)  Atorvastatin 10mg  Amlodipine 10mg  Hydralazine PRN     GI/Nutrition:   Nausea: improving. Pt now able to eat. Continue zofran.  Constipation, improved:  miralax PRN, senna PRN  Diet: yesterday kcal 734, continuing to encourage PO intake.     Endocrine:   Steroid hyperglycemia: Resolved.  Hypothyroidism:  "Levothyroxine 37.5 mcg     Fluid/Electrolytes:   Hyperkalemia post-op: K 6.4 post-op, required emergent dialysis. K now normal.   Hypervolemia: Dialyzed 8/7.  Hyponatremia: D/t hypervolemia.     :   IV Bumex 4mg BID x1  Bladder spasms: B&O suppository PRN, Ditropan PRN  Vaginal yeast infection: Pt reports symptoms and discharge. On miconazole suppositories.     Infectious disease: Tmax 99.1F  R/o UTI: Dysuria and pyuria on UA (also RBC, but has menses). UC NGTD.    Psychiatry:  Anxiety surrounding health, psychiatry consulted. Health psychology also consulted.  Atarax 10mg in am, 25mg in pm     Prophylaxis:   IS, SCDs, valcyte, will need pentamidine neb (d/c'd dapsone 2/2 methemoglobinemia)    Medical Decision Making: Medium  Subsequent visit 22064 (moderate level decision making)    Resident: Gisselle Ruggiero, PGY1    Faculty: Dr. Michel  _________________________________________________________________  Transplant History: Admitted 8/2/2019 for kidney transplant.  8/3/2019 (Kidney), Postoperative day: 8     Interval History:   Patient reports improved appetite, no nausea. +gas, +bowel movement. Pain continues to improve. Persistent tightness around abdomen and in legs, along with new bilateral calf pain, R > L. No shortness of breath. Has been walking around. Reports significant anxiety around kidney taking a while to \"wake up.\"    ROS:   A 10-point review of systems was negative except as noted above.    Meds:    amLODIPine  10 mg Oral Daily     aspirin  81 mg Oral Daily     atorvastatin  10 mg Oral Daily     bumetanide  4 mg Intravenous BID     ciprofloxacin  500 mg Oral Q24H ALEJANDRO     levothyroxine  37.5 mcg Oral Q Mon Wed Fri AM     levothyroxine  25 mcg Oral Once per day on Sun Tue Thu Sat     miconazole  100 mg Vaginal At Bedtime     multivitamin RENAL  1 capsule Oral Daily     mycophenolate  750 mg Oral BID IS     sevelamer  2,400 mg Oral TID w/meals     sodium chloride (PF)  10 mL Intracatheter Q8H "     tacrolimus  4.5 mg Oral BID IS     valGANciclovir  450 mg Oral Once per day on Mon Thu       Physical Exam:     Admit Weight: 54 kg (119 lb 0.8 oz)  Today's weight: 127 lbs 3.2 oz    Vital sign ranges:    Temp:  [98.1  F (36.7  C)-99.1  F (37.3  C)] 98.6  F (37  C)  Pulse:  [96] 96  Heart Rate:  [] 109  Resp:  [14-20] 20  BP: (129-151)/(80-99) 138/95  SpO2:  [94 %-98 %] 98 %    GENERAL: NAD  SKIN: No jaundice. No rashes or lesions.   HEENT: Eyes symmetrical and free of discharge bilaterally.  CV: well perfused  RESPIRATORY: Respirations regular, even, and unlabored  GI:soft, non-distended, non-tender. Incision with dermabond, C/D/I.  : No brooks  EXTREMITIES: trace BLE edema. L AVF, +thrill.  NEUROLOGIC: Alert and orientated x 4. No focal deficits.   MUSCULOSKELETAL: No joint swelling or tenderness.     Data:   CMP  Recent Labs   Lab 08/11/19  0606 08/10/19  0603    133   POTASSIUM 4.0 4.3   CHLORIDE 98 100   CO2 22 26   GLC 81 82   BUN 46* 31*   CR 7.97* 5.64*   GFRESTIMATED 6* 10*   GFRESTBLACK 7* 11*   SHAMIR 9.8 9.8   MAG 2.1 1.9   PHOS 5.9* 4.4     CBC  Recent Labs   Lab 08/11/19  0606 08/10/19  0603   HGB 7.1* 7.3*   WBC 8.1 6.3    120*     COAGSNo lab results found in last 7 days.    Invalid input(s): XA   Urinalysis  Recent Labs   Lab Test 08/08/19  1515 12/11/13  1915 12/10/13  2050   COLOR Red  --  Straw   APPEARANCE Cloudy  --  Clear   URINEGLC Negative  --  70*   URINEBILI Negative  --  Negative   URINEKETONE Negative  --  Negative   SG 1.015  --  1.008   UBLD Large*  --  Small*   URINEPH 6.0  --  6.5   PROTEIN 300*  --  100*   NITRITE Negative  --  Negative   LEUKEST Moderate*  --  Negative   RBCU >182*  --  2   WBCU 9*  --  9*   UTPG  --  7.42*  --      Virology:  CMV IgG Antibody   Date Value Ref Range Status   12/26/2013 >10.00  Positive for anti-CMV IgG U/mL Final     EBV VCA IgG Antibody   Date Value Ref Range Status   12/26/2013 57.80 U/mL Final     Comment:     Positive,  suggests immunologic exposure.     Hepatitis C Antibody   Date Value Ref Range Status   08/02/2019 Nonreactive NR^Nonreactive Final     Comment:     Assay performance characteristics have not been established for newborns,   infants, and children       Hep B Surface Noemy   Date Value Ref Range Status   12/12/2013 34.4  Final     Comment:     Reactive, Patient is considered to be immune to infection with hepatitis B   when   the value is greater than or equal to 12.0 mlU/mL.

## 2019-08-11 NOTE — CONSULTS
"Consult Date:  08/11/2019      The Transplant Service requested a consultation to evaluate this patient who is postop day #8 from kidney transplant and is having much anxiety about her graft, dialysis, worries about death and things of that sort.      I met with the patient.  She describes quite a bit of anxiety about various issues related to her medical situation.  She states that she has no history of anxiety or psychiatric disorder prior to issues related to her kidney transplant, but that when her kidneys started to form more poorly.  She has been on dialysis for 5-1/2 years.  She began to have some anxiety.  Spoke with her primary care physician, was put on some hydroxyzine, which she did not take.  She did state that she has fears that she will \"die.\"  She referred to her sleepy kidney and in general has multiple fears around her current condition and whether she will get better.  She does state that the doctors have told her she is doing well, but she has a hard time accepting that.  She states that in general, she does not feel quite as good as she did when she was on dialysis.  She does state she has some mild depression, again only when she thinks about her health issues.  She denies suicidal ideation, but states at times she wishes she would be better off if she passed away and she realizes that this is because she feels overwhelmed by the stress at times.  She does not feel this would constantly.  She has no true suicidal thoughts or plan, just some passive ideation.      When asked how the future looked the patient stated it looks good for her when her kidney gets better.  I asked how she felt about herself, she said she liked herself.  She is confident happy person usually.  She denied guilt, denied hopelessness, helplessness, denied any psychotic symptoms.      PREVIOUS PSYCHIATRIC HISTORY:  Negative.      FAMILY PSYCHIATRIC HISTORY:  Negative.      CHEMICAL DEPENDENCY HISTORY:  Negative.      SOCIAL " HISTORY:  The patient is culturally .  She has had some college.  She works as a pharmacy tech.  She has no children.  Her  and family are supportive.      PAST MEDICAL HISTORY:  Significant for end-stage renal disease on dialysis, hypertension, hypothyroidism, pituitary adenoma, history of being on dialysis.      MEDICATIONS ON ADMISSION:  Reviewed per Epic and include hydroxyzine 25 mg p.r.n.      REVIEW OF SYSTEMS:  A 10-point review of systems was performed.  The patient currently states she has some back pain which has been a chronic condition for her.  She has some pain at the surgical site, which is well controlled.  She does state she is having some constipation and gas mild shortness of breath at times.  No chest pain, no nausea, vomiting, fevers, chills.  Rest of 10-point review of systems is negative.      VITAL SIGNS:  Blood pressure 130/95, temperature 98.6, pulse 96, respirations 16.      MENTAL STATUS EXAMINATION:   General appearance and behavior:  The patient is lying in bed.  She is engageable.  She is in no acute distress.   Mood and affect:  Her mood is described as somewhat anxious.  Her affect is mobile and appropriate.   Thought processes are goal directed.  No loosening of associations.   Thought content:  Denies auditory or visual hallucinations, denies suicidal ideation.   Attention and concentration are intact.   Speech and language are appropriate.   Orientation:  Alert and oriented x3.  Short-term and long-term memory appear intact.Fof K average.    Judgment and insight:  Appear good in that she does understand some of the irrational fears and is willing to work on them.   Muscle bulk and tone appear normal.   Abnormal movements:  None noted.      IMPRESSION:  The patient is a 28-year-old female with a history of end-stage renal disease, currently status post transplant.  She has a number of fears and worries and anxieties primarily around her medical status.  She has no  history of previous anxiety.  She has no history of anxiety around other things.  Overall, when I speak with her fears are primarily around her prognosis, various aspects of her transplant, her recovery and rehabilitation and things of that sort.  She does not appear to be clinically depressed.      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      TREATMENT RECOMMENDATIONS:   1.  Please consult Health Psychology to work with this patient and help her be able to better process her anxiety and work with some of her fears and catastrophic thinking.   2.  In terms of medication, she does receive some relief from Atarax.  I think it would be appropriate.  I do not see an indication for any other psychotropics at this time.   3.  The patient should be set up with outpatient psychotherapy.  Hopefully, she can continue to see the Health psychologists she starts with here in the hospital.   4.  Please call or reconsult with any questions, concerns or change in the patient's status.  My number is [phone].         JORDAN CEDENO MD             D: 2019   T: 2019   MT: ERNESTINA      Name:     LIZZETTE PALMER   MRN:      [id number]        Account:       [id number]   :      1990           Consult Date:  2019      Document: [id number]

## 2019-08-11 NOTE — PLAN OF CARE
[id number]    /78 (BP Location: Right arm)   Pulse 96   Temp 98.6  F (37  C) (Oral)   Resp 16   Ht 1.524 m (5')   Wt 57.7 kg (127 lb 3.2 oz)   SpO2 98%   BMI 24.84 kg/m       VS: AVSS and afebrile, on 2LPM NC when up and walking.   BG: none.   LABS: elevated creatinine, will continue to monitor, possible dialysis if needed based on tomorrows labs.   Pain: Tylenol x1, dil 2mg x1.   PRN: atarax x1, simethicone x1, and TUMS x1. Senna x1 and colace x1  Nausea: denies.   Diet: Regular with good PO intake.   LDA: CVC SL'd, PIV SL'd.   GI/: voiding numerous times in bathroom, small bowel movements.   Skin: abd incision liquid banadge EMILY.   Mobility: IND with walker and/or IV pole.   Education: encouraging independence, needs reinforcement.   Tests/Procedures: psych consulted, and US on lower extremely due to pain.   Plan: Possible dialysis tomorrow depending on labs, monitor pain and anxiety levels (atarax dose changed to 10mg twice daily PRN and 25mg PRN at bedtime: which patient will want).     All care explained and questions answered. Will continue to monitor and notify team of changes.

## 2019-08-11 NOTE — PLAN OF CARE
1900 - 0730  BP (!) 141/96 (BP Location: Left arm)   Pulse 96   Temp 98.2  F (36.8  C) (Oral)   Resp 16   Ht 1.524 m (5')   Wt 58.8 kg (129 lb 10.1 oz)   SpO2 96%   BMI 25.32 kg/m      VSS on RA - dyspneic on exertion sometimes requiring 2 LPM  Pain/Nausea: PRN tylenol x 1 and dilaudid x 1 for abdominal pain. Atarax x 1 for anxiety. Simethicone x 1 for gas discomfort. Denied nausea  Diet: Regular  LDA: I.J. Saline locked, PIV saline locked, Fistula WDL  GI: Voided 350 mL  : 2 small BMs  Skin: incision closed with liquid bandage open to air  Mobility: Up with standby assist - c/o stiffness, paged on call overnight   Plan: continue to monitor

## 2019-08-12 ENCOUNTER — RECORDS - HEALTHEAST (OUTPATIENT)
Dept: ADMINISTRATIVE | Facility: OTHER | Age: 29
End: 2019-08-12

## 2019-08-12 VITALS
SYSTOLIC BLOOD PRESSURE: 138 MMHG | HEART RATE: 104 BPM | RESPIRATION RATE: 18 BRPM | TEMPERATURE: 98.2 F | DIASTOLIC BLOOD PRESSURE: 91 MMHG | OXYGEN SATURATION: 100 % | BODY MASS INDEX: 25.19 KG/M2 | WEIGHT: 128.3 LBS | HEIGHT: 60 IN

## 2019-08-12 PROBLEM — F41.9 ANXIETY: Status: ACTIVE | Noted: 2019-08-12

## 2019-08-12 LAB
ABO + RH BLD: NORMAL
ABO + RH BLD: NORMAL
ALBUMIN UR-MCNC: 30 MG/DL
ANION GAP SERPL CALCULATED.3IONS-SCNC: 9 MMOL/L (ref 3–14)
APPEARANCE UR: CLEAR
BACTERIA #/AREA URNS HPF: ABNORMAL /HPF
BILIRUB UR QL STRIP: NEGATIVE
BLD GP AB SCN SERPL QL: NORMAL
BLD PROD TYP BPU: NORMAL
BLD PROD TYP BPU: NORMAL
BLD UNIT ID BPU: 0
BLOOD BANK CMNT PATIENT-IMP: NORMAL
BLOOD PRODUCT CODE: NORMAL
BPU ID: NORMAL
BUN SERPL-MCNC: 62 MG/DL (ref 7–30)
CALCIUM SERPL-MCNC: 9.6 MG/DL (ref 8.5–10.1)
CHLORIDE SERPL-SCNC: 97 MMOL/L (ref 94–109)
CO2 SERPL-SCNC: 23 MMOL/L (ref 20–32)
COLOR UR AUTO: ABNORMAL
CREAT SERPL-MCNC: 9.56 MG/DL (ref 0.52–1.04)
ERYTHROCYTE [DISTWIDTH] IN BLOOD BY AUTOMATED COUNT: 13 % (ref 10–15)
GFR SERPL CREATININE-BSD FRML MDRD: 5 ML/MIN/{1.73_M2}
GLUCOSE SERPL-MCNC: 85 MG/DL (ref 70–99)
GLUCOSE UR STRIP-MCNC: NEGATIVE MG/DL
HCT VFR BLD AUTO: 20.4 % (ref 35–47)
HGB BLD-MCNC: 6.7 G/DL (ref 11.7–15.7)
HGB UR QL STRIP: ABNORMAL
INTERPRETATION ECG - MUSE: NORMAL
IRON SATN MFR SERPL: 59 % (ref 15–46)
IRON SERPL-MCNC: 96 UG/DL (ref 35–180)
KETONES UR STRIP-MCNC: NEGATIVE MG/DL
LEUKOCYTE ESTERASE UR QL STRIP: ABNORMAL
MAGNESIUM SERPL-MCNC: 2.1 MG/DL (ref 1.6–2.3)
MCH RBC QN AUTO: 31.5 PG (ref 26.5–33)
MCHC RBC AUTO-ENTMCNC: 32.8 G/DL (ref 31.5–36.5)
MCV RBC AUTO: 96 FL (ref 78–100)
NITRATE UR QL: NEGATIVE
NUM BPU REQUESTED: 1
PH UR STRIP: 5.5 PH (ref 5–7)
PHOSPHATE SERPL-MCNC: 6.1 MG/DL (ref 2.5–4.5)
PLATELET # BLD AUTO: 168 10E9/L (ref 150–450)
POTASSIUM SERPL-SCNC: 4.2 MMOL/L (ref 3.4–5.3)
RBC # BLD AUTO: 2.13 10E12/L (ref 3.8–5.2)
RBC #/AREA URNS AUTO: 82 /HPF (ref 0–2)
SODIUM SERPL-SCNC: 129 MMOL/L (ref 133–144)
SOURCE: ABNORMAL
SP GR UR STRIP: 1.01 (ref 1–1.03)
SPECIMEN EXP DATE BLD: NORMAL
SQUAMOUS #/AREA URNS AUTO: 1 /HPF (ref 0–1)
TIBC SERPL-MCNC: 162 UG/DL (ref 240–430)
TRANS CELLS #/AREA URNS HPF: <1 /HPF (ref 0–1)
TRANSFUSION STATUS PATIENT QL: NORMAL
TRANSFUSION STATUS PATIENT QL: NORMAL
UROBILINOGEN UR STRIP-MCNC: NORMAL MG/DL (ref 0–2)
WBC # BLD AUTO: 10.1 10E9/L (ref 4–11)
WBC #/AREA URNS AUTO: 3 /HPF (ref 0–5)

## 2019-08-12 PROCEDURE — 85027 COMPLETE CBC AUTOMATED: CPT | Performed by: STUDENT IN AN ORGANIZED HEALTH CARE EDUCATION/TRAINING PROGRAM

## 2019-08-12 PROCEDURE — 83550 IRON BINDING TEST: CPT | Performed by: STUDENT IN AN ORGANIZED HEALTH CARE EDUCATION/TRAINING PROGRAM

## 2019-08-12 PROCEDURE — 25000132 ZZH RX MED GY IP 250 OP 250 PS 637: Mod: GY | Performed by: NURSE PRACTITIONER

## 2019-08-12 PROCEDURE — 25000132 ZZH RX MED GY IP 250 OP 250 PS 637: Mod: GY | Performed by: SURGERY

## 2019-08-12 PROCEDURE — 86850 RBC ANTIBODY SCREEN: CPT | Performed by: TRANSPLANT SURGERY

## 2019-08-12 PROCEDURE — 86923 COMPATIBILITY TEST ELECTRIC: CPT | Performed by: TRANSPLANT SURGERY

## 2019-08-12 PROCEDURE — 83735 ASSAY OF MAGNESIUM: CPT | Performed by: STUDENT IN AN ORGANIZED HEALTH CARE EDUCATION/TRAINING PROGRAM

## 2019-08-12 PROCEDURE — 36592 COLLECT BLOOD FROM PICC: CPT | Performed by: STUDENT IN AN ORGANIZED HEALTH CARE EDUCATION/TRAINING PROGRAM

## 2019-08-12 PROCEDURE — 25000132 ZZH RX MED GY IP 250 OP 250 PS 637: Mod: GY | Performed by: STUDENT IN AN ORGANIZED HEALTH CARE EDUCATION/TRAINING PROGRAM

## 2019-08-12 PROCEDURE — 81001 URINALYSIS AUTO W/SCOPE: CPT | Performed by: STUDENT IN AN ORGANIZED HEALTH CARE EDUCATION/TRAINING PROGRAM

## 2019-08-12 PROCEDURE — 83540 ASSAY OF IRON: CPT | Performed by: STUDENT IN AN ORGANIZED HEALTH CARE EDUCATION/TRAINING PROGRAM

## 2019-08-12 PROCEDURE — P9016 RBC LEUKOCYTES REDUCED: HCPCS | Performed by: TRANSPLANT SURGERY

## 2019-08-12 PROCEDURE — 86900 BLOOD TYPING SEROLOGIC ABO: CPT | Performed by: TRANSPLANT SURGERY

## 2019-08-12 PROCEDURE — 86901 BLOOD TYPING SEROLOGIC RH(D): CPT | Performed by: TRANSPLANT SURGERY

## 2019-08-12 PROCEDURE — 25000131 ZZH RX MED GY IP 250 OP 636 PS 637: Mod: GY | Performed by: SURGERY

## 2019-08-12 PROCEDURE — 84100 ASSAY OF PHOSPHORUS: CPT | Performed by: STUDENT IN AN ORGANIZED HEALTH CARE EDUCATION/TRAINING PROGRAM

## 2019-08-12 PROCEDURE — 87086 URINE CULTURE/COLONY COUNT: CPT | Performed by: STUDENT IN AN ORGANIZED HEALTH CARE EDUCATION/TRAINING PROGRAM

## 2019-08-12 PROCEDURE — 25000131 ZZH RX MED GY IP 250 OP 636 PS 637: Mod: GY | Performed by: STUDENT IN AN ORGANIZED HEALTH CARE EDUCATION/TRAINING PROGRAM

## 2019-08-12 PROCEDURE — 80048 BASIC METABOLIC PNL TOTAL CA: CPT | Performed by: STUDENT IN AN ORGANIZED HEALTH CARE EDUCATION/TRAINING PROGRAM

## 2019-08-12 PROCEDURE — 25000128 H RX IP 250 OP 636: Performed by: STUDENT IN AN ORGANIZED HEALTH CARE EDUCATION/TRAINING PROGRAM

## 2019-08-12 RX ORDER — HYDROXYZINE HYDROCHLORIDE 10 MG/1
10 TABLET, FILM COATED ORAL 3 TIMES DAILY PRN
Qty: 20 TABLET | Refills: 0 | Status: SHIPPED | OUTPATIENT
Start: 2019-08-12 | End: 2023-04-13

## 2019-08-12 RX ORDER — ALBUTEROL SULFATE 0.83 MG/ML
2.5 SOLUTION RESPIRATORY (INHALATION) ONCE
Status: CANCELLED | OUTPATIENT
Start: 2019-08-13

## 2019-08-12 RX ORDER — TACROLIMUS 0.5 MG/1
CAPSULE ORAL
Qty: 30 CAPSULE | Refills: 0 | Status: SHIPPED | OUTPATIENT
Start: 2019-08-12 | End: 2019-09-20

## 2019-08-12 RX ORDER — BUMETANIDE 1 MG/1
4 TABLET ORAL
Status: DISCONTINUED | OUTPATIENT
Start: 2019-08-12 | End: 2019-08-12 | Stop reason: HOSPADM

## 2019-08-12 RX ORDER — ALBUTEROL SULFATE 0.83 MG/ML
2.5 SOLUTION RESPIRATORY (INHALATION)
Status: DISCONTINUED | OUTPATIENT
Start: 2019-08-12 | End: 2019-08-12

## 2019-08-12 RX ORDER — PENTAMIDINE ISETHIONATE 300 MG/300MG
300 INHALANT RESPIRATORY (INHALATION)
Status: DISCONTINUED | OUTPATIENT
Start: 2019-08-12 | End: 2019-08-12

## 2019-08-12 RX ORDER — AMLODIPINE BESYLATE 10 MG/1
10 TABLET ORAL DAILY
Qty: 30 TABLET | Refills: 5 | Status: SHIPPED | OUTPATIENT
Start: 2019-08-13 | End: 2019-08-15

## 2019-08-12 RX ORDER — HYDROMORPHONE HYDROCHLORIDE 2 MG/1
1 TABLET ORAL EVERY 4 HOURS PRN
Qty: 5 TABLET | Refills: 0 | Status: SHIPPED | OUTPATIENT
Start: 2019-08-12 | End: 2019-08-13

## 2019-08-12 RX ORDER — ASPIRIN 81 MG/1
81 TABLET, CHEWABLE ORAL DAILY
Qty: 30 TABLET | Refills: 5 | Status: SHIPPED | OUTPATIENT
Start: 2019-08-13 | End: 2020-02-06

## 2019-08-12 RX ORDER — TACROLIMUS 1 MG/1
5 CAPSULE ORAL 2 TIMES DAILY
Qty: 300 CAPSULE | Refills: 11 | Status: SHIPPED | OUTPATIENT
Start: 2019-08-12 | End: 2019-08-15

## 2019-08-12 RX ORDER — PENTAMIDINE ISETHIONATE 300 MG/300MG
300 INHALANT RESPIRATORY (INHALATION) ONCE
Status: CANCELLED | OUTPATIENT
Start: 2019-08-13

## 2019-08-12 RX ORDER — VALGANCICLOVIR 450 MG/1
TABLET, FILM COATED ORAL
Qty: 60 TABLET | Refills: 2 | Status: SHIPPED | OUTPATIENT
Start: 2019-08-12 | End: 2019-08-27

## 2019-08-12 RX ORDER — ACETAMINOPHEN AND CODEINE PHOSPHATE 120; 12 MG/5ML; MG/5ML
SOLUTION ORAL
Start: 2019-08-12 | End: 2019-12-03

## 2019-08-12 RX ORDER — ALBUTEROL SULFATE 0.83 MG/ML
2.5 SOLUTION RESPIRATORY (INHALATION) ONCE
Status: DISCONTINUED | OUTPATIENT
Start: 2019-08-12 | End: 2019-08-12

## 2019-08-12 RX ORDER — BUMETANIDE 0.25 MG/ML
4 INJECTION INTRAMUSCULAR; INTRAVENOUS 2 TIMES DAILY
Status: DISCONTINUED | OUTPATIENT
Start: 2019-08-12 | End: 2019-08-12

## 2019-08-12 RX ORDER — PENTAMIDINE ISETHIONATE 300 MG/300MG
300 INHALANT RESPIRATORY (INHALATION)
Start: 2019-08-12 | End: 2020-07-09

## 2019-08-12 RX ORDER — ATORVASTATIN CALCIUM 10 MG/1
10 TABLET, FILM COATED ORAL DAILY
Qty: 30 TABLET | Refills: 11 | Status: SHIPPED | OUTPATIENT
Start: 2019-08-13 | End: 2019-10-08

## 2019-08-12 RX ORDER — MYCOPHENOLATE MOFETIL 250 MG/1
750 CAPSULE ORAL 2 TIMES DAILY
Qty: 180 CAPSULE | Refills: 11 | Status: SHIPPED | OUTPATIENT
Start: 2019-08-12 | End: 2019-08-19

## 2019-08-12 RX ORDER — POLYETHYLENE GLYCOL 3350 17 G/17G
1 POWDER, FOR SOLUTION ORAL DAILY PRN
Qty: 500 G | Refills: 3 | Status: SHIPPED | OUTPATIENT
Start: 2019-08-12 | End: 2019-08-27

## 2019-08-12 RX ORDER — BUMETANIDE 2 MG/1
4 TABLET ORAL
Qty: 20 TABLET | Refills: 0 | Status: SHIPPED | OUTPATIENT
Start: 2019-08-12 | End: 2019-08-23

## 2019-08-12 RX ADMIN — HYDROXYZINE HYDROCHLORIDE 10 MG: 10 TABLET ORAL at 12:30

## 2019-08-12 RX ADMIN — ACETAMINOPHEN 975 MG: 325 TABLET, FILM COATED ORAL at 09:07

## 2019-08-12 RX ADMIN — MYCOPHENOLATE MOFETIL 750 MG: 250 CAPSULE ORAL at 08:37

## 2019-08-12 RX ADMIN — AMLODIPINE BESYLATE 10 MG: 10 TABLET ORAL at 08:37

## 2019-08-12 RX ADMIN — ASPIRIN 81 MG CHEWABLE TABLET 81 MG: 81 TABLET CHEWABLE at 08:37

## 2019-08-12 RX ADMIN — Medication 1 MG: at 14:03

## 2019-08-12 RX ADMIN — ATORVASTATIN CALCIUM 10 MG: 10 TABLET, FILM COATED ORAL at 08:37

## 2019-08-12 RX ADMIN — VALGANCICLOVIR 450 MG: 450 TABLET, FILM COATED ORAL at 08:37

## 2019-08-12 RX ADMIN — SIMETHICONE CHEW TAB 80 MG 80 MG: 80 TABLET ORAL at 14:52

## 2019-08-12 RX ADMIN — DOCUSATE SODIUM 100 MG: 100 CAPSULE, LIQUID FILLED ORAL at 08:41

## 2019-08-12 RX ADMIN — SEVELAMER CARBONATE 800 MG: 800 TABLET, FILM COATED ORAL at 08:37

## 2019-08-12 RX ADMIN — Medication 37.5 MCG: at 08:37

## 2019-08-12 RX ADMIN — SENNOSIDES AND DOCUSATE SODIUM 2 TABLET: 8.6; 5 TABLET ORAL at 08:41

## 2019-08-12 RX ADMIN — Medication 2 MG: at 05:37

## 2019-08-12 RX ADMIN — BUMETANIDE 4 MG: 0.25 INJECTION INTRAMUSCULAR; INTRAVENOUS at 10:52

## 2019-08-12 RX ADMIN — Medication 1 CAPSULE: at 08:37

## 2019-08-12 RX ADMIN — ACETAMINOPHEN 975 MG: 325 TABLET, FILM COATED ORAL at 01:07

## 2019-08-12 RX ADMIN — ACETAMINOPHEN 975 MG: 325 TABLET, FILM COATED ORAL at 16:05

## 2019-08-12 RX ADMIN — TACROLIMUS 5 MG: 5 CAPSULE ORAL at 08:37

## 2019-08-12 RX ADMIN — HYDROXYZINE HYDROCHLORIDE 25 MG: 25 TABLET ORAL at 01:07

## 2019-08-12 RX ADMIN — CALCIUM CARBONATE (ANTACID) CHEW TAB 500 MG 500 MG: 500 CHEW TAB at 14:52

## 2019-08-12 ASSESSMENT — ACTIVITIES OF DAILY LIVING (ADL)
ADLS_ACUITY_SCORE: 13

## 2019-08-12 ASSESSMENT — MIFFLIN-ST. JEOR: SCORE: 1243.46

## 2019-08-12 NOTE — PROGRESS NOTES
[facility], [place]   Transplant Nephrology Progress Note  Date of Admission:  2019  Today's Date: 2019     Today Recommendations:  - To follow up at the nephrology clinic tomorrow upon discharge.     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 UOP picking up.      # Immunosuppression:  - usual standard induction completed.   - Tacrolimus immediate release (goal 8-10) and Mycophenolate mofetil (goal 1-3.5)              - Changes: No    Tac 5.1 ng / ml on , increased to 4.5 mg po bid from 3 mg po bid on the . Follow up troughs.      # Prophylaxis:              - PJP: no dapsone due to methemaglobinemia; pentamidine as OP.              - CMV: Valcyte 450 q M-     # Hypertension:               - Controlled; Goal BP: < 140/90              - On Amlodipine 10 mg daily              - Volume status:  Hypervolemic - limit fluid intake to minimize volume overload, continue with Bumex 4 mg BID.               - Changes: No  - To re-evaluate tomorrow.     # Anemia in Chronic Renal Disease:   - Hgb: dropped to 6.7 g/dl today, one unit of RBC transfusion.       # Mineral Bone Disorder:   - Secondary renal hyperparathyroidism; PTH level is elevated at 358 pg/mL   - Vitamin D deficiency: D3 2000 units daily.               - Hyperphosphatemia: PhosLo with meals; Phos level 6.1 mg / dl today.     # Transplant History:  Etiology of kidney failure: IgA nephropathy  Tx: DDKT  Transplant: 8/3/2019 (Kidney)  Donor Type:  - Cardiac Death        Donor Class: Standard Criteria Donor  Crossmatch at time of Tx: negative  Significant changes in immunosuppression: None  Significant transplant-related complications: None     Recommendations were communicated to the primary team via  this note and verbally.     Patient was seen and discussed with Dr. Rios.     Meggan Birch MD  Nephrology Fellow  Pager: [phone]    Interval History   Patient was seen and examined at bedside this morning, no events overnight. HD last on friday. No cp, sob, no n/v/, no f/s/c. No constipation or diarrhea.    Review of Systems   4 point ROS was obtained and negative except as noted in the Interval History.    Physical Exam   Temp  Av.3  F (36.8  C)  Min: 97.6  F (36.4  C)  Max: 98.9  F (37.2  C)      Pulse  Av.1  Min: 67  Max: 129 Resp  Av.1  Min: 10  Max: 22  SpO2  Av.7 %  Min: 91 %  Max: 100 %    CVP (mmHg): 7 mmHgBP (!) 138/91 (BP Location: Right arm)   Pulse 104   Temp 98.2  F (36.8  C) (Oral)   Resp 18   Ht 1.524 m (5')   Wt 58.2 kg (128 lb 4.8 oz)   SpO2 100%   BMI 25.06 kg/m     Date 19 0700 - 19 0659   Shift 9599-4188 6234-2932 4094-4497 24 Hour Total   INTAKE   I.V. 30   30   Shift Total(mL/kg) 30(0.56)   30(0.56)   OUTPUT   Shift Total(mL/kg)       Weight (kg) 54 54 54 54      Admit Weight: 54 kg (119 lb 0.8 oz)   GENERAL APPEARANCE: alert and no distress  HENT: mouth without ulcers or lesions  LYMPHATICS: no cervical or supraclavicular nodes  RESP: lungs clear to auscultation - no rales, rhonchi or wheezes  CV: regular rhythm, normal rate, no rub, no murmur  EDEMA: 2+ LE edema bilaterally  ABDOMEN: soft, nondistended, nontender, bowel sounds normal  MS: extremities normal - no gross deformities noted, no evidence of inflammation in joints, no muscle tenderness  SKIN: no rash  Kidney transplant: normal.     Data   All labs reviewed by me.  CMP  Recent Labs   Lab 19  0619 19  0606 08/10/19  0603 19  0559   * 133 133 130*   POTASSIUM 4.2 4.0 4.3 3.8   CHLORIDE 97 98 100 96   CO2 23 22 26 22   ANIONGAP 9 13 7 12   GLC 85 81 82 80   BUN 62* 46* 31* 66*   CR 9.56* 7.97* 5.64* 9.23*   GFRESTIMATED 5* 6* 10* 5*   GFRESTBLACK 6* 7* 11* 6*   SHAMIR  9.6 9.8 9.8 9.6   MAG 2.1 2.1 1.9 2.5*   PHOS 6.1* 5.9* 4.4 6.0*     CBC  Recent Labs   Lab 08/12/19  0619 08/11/19  0606 08/10/19  0603 08/09/19  0559   HGB 6.7* 7.1* 7.3* 7.9*   WBC 10.1 8.1 6.3 7.2   RBC 2.13* 2.27* 2.36* 2.55*   HCT 20.4* 22.4* 23.1* 24.5*   MCV 96 99 98 96   MCH 31.5 31.3 30.9 31.0   MCHC 32.8 31.7 31.6 32.2   RDW 13.0 13.0 13.2 13.0    168 120* 88*     Urine Studies  Recent Labs   Lab Test 08/12/19  1030 08/08/19  1515 12/10/13  2050 05/15/13  1951   COLOR Light Yellow Red Straw Dark Yellow   APPEARANCE Clear Cloudy Clear Slightly Cloudy   URINEGLC Negative Negative 70* Negative   URINEBILI Negative Negative Negative Negative   URINEKETONE Negative Negative Negative Negative   SG 1.007 1.015 1.008 1.011   UBLD Large* Large* Small* Moderate*   URINEPH 5.5 6.0 6.5 6.0   PROTEIN 30* 300* 100* 300*   NITRITE Negative Negative Negative Negative   LEUKEST Small* Moderate* Negative Large*   RBCU 82* >182* 2 19*   WBCU 3 9* 9* 151*     Recent Labs   Lab Test 12/11/13  1915   UTPG 7.42*     PTH  Recent Labs   Lab Test 08/09/19  0559 12/11/13  0601   PTHI 358* 1,131*     Iron Studies  Recent Labs   Lab Test 08/12/19  0619 12/29/16  1338 12/11/13  0600   IRON 96 137 65   * 228* 245   IRONSAT 59* 60* 26   MONET  --  1,039*  --      MEDICATIONS:  Current Facility-Administered Medications   Medication     acetaminophen (TYLENOL) tablet 975 mg     amLODIPine (NORVASC) tablet 10 mg     aspirin (ASA) chewable tablet 81 mg     atorvastatin (LIPITOR) tablet 10 mg     bisacodyl (DULCOLAX) Suppository 10 mg     bumetanide (BUMEX) tablet 4 mg     calcium carbonate (TUMS) chewable tablet 500 mg     docusate sodium (COLACE) capsule 100 mg     hydrALAZINE (APRESOLINE) tablet 25 mg     HYDROmorphone (DILAUDID) half-tab 1 mg     hydrOXYzine (ATARAX) tablet 10 mg     hydrOXYzine (ATARAX) tablet 25 mg     levothyroxine (SYNTHROID/LEVOTHROID) half-tab 37.5 mcg     levothyroxine (SYNTHROID/LEVOTHROID) tablet 25  mcg     lidocaine (LMX4) cream     lidocaine (XYLOCAINE) 5 % ointment     lidocaine 1 % 0.1-1 mL     multivitamin RENAL (NEPHROCAPS/TRIPHROCAPS) capsule 1 capsule     mycophenolate (GENERIC EQUIVALENT) capsule 750 mg     naloxone (NARCAN) injection 0.1-0.4 mg     ondansetron (ZOFRAN) injection 4 mg     opium-belladonna (B&O SUPPRETTES) 30-16.2 MG per suppository 0.5 suppository     polyethylene glycol (MIRALAX/GLYCOLAX) Packet 17 g     senna-docusate (SENOKOT-S/PERICOLACE) 8.6-50 MG per tablet 2 tablet     sevelamer (RENVELA) tablet 2,400 mg     simethicone (MYLICON) chewable tablet 80 mg     sodium chloride (PF) 0.9% PF flush 10 mL     sodium chloride (PF) 0.9% PF flush 10-20 mL     tacrolimus (GENERIC EQUIVALENT) capsule 5 mg     valGANciclovir (VALCYTE) tablet 450 mg     Meggan Birch M.D.  Nephrology Fellow  Pager: [phone]    Attestation:  This patient has been seen and evaluated by me, Deny Rios MD.  I have reviewed the note and agree with plan of care as documented by the fellow.

## 2019-08-12 NOTE — PROGRESS NOTES
CLINICAL NUTRITION SERVICES - BRIEF NOTE     Nutrition Prescription    RECOMMENDATIONS FOR MDs/PROVIDERS TO ORDER:  Consider scheduled zofran to see if patient able to take more nutrition.      Recommendations already ordered by Registered Dietitian (RD):  Boost Plus vanilla + 8 oz Lactaid Milk (patient to mix together)  Repeat calorie counts (8/13-8/15)       EVALUATION OF THE PROGRESS TOWARD GOALS   Diet: Regular    Intake: Patient dislikes Gelatein Plus and feels Boost supplements are too sweet.  Continue to eat only small bites of food from home.  Complains of an off taste with meat and nausea even talking about food.      2 day calorie count average = 729 kcal (52% kcal needs), 33 grams protein (54%).   Suspect PO intake may be slightly higher than that which is reflected in calorie counts, however patient still eating poorly overall/poor tolerance to PO intake.       Monitoring/Evaluation  Progress toward goals will be monitored and evaluated per protocol.    Nancy Vaca MS, RD, LD  Pager [phone]

## 2019-08-12 NOTE — PROGRESS NOTES
Calorie Count  Intake recorded for: 8/11  Total Kcals: 723 Total Protein: 26g  Kcals from Hospital Food: 508  Protein: 22g  Kcals from Outside Food (average):215 Protein: 4g  # Meals Recorded: 2 meals (First - 100% blueberry muffin, 1.25 lactose free milk, 50% omelet w/ sausage, 25% corn flakes)      (Second - 100% 1.25 servings of frosted flakes, 25% chicken noodle soup from home)  # Supplements Recorded: 0    Note: Pt also had rice porridge and pork from outside the hospital, but not enough information to calculate calories.

## 2019-08-12 NOTE — PHARMACY-TRANSPLANT NOTE
Solid Organ Transplant Recipient Prior to Discharge Note    28 year old female s/p  Cardiac Death donation Kidney Transplant 8/3/2019 for ESRD 2/2 IgA nephropathy.     Other PMH includes SAH in , seizure x2, hypothyroidism, chronic ITP, hyperprolactinemia associated with pituitary adenoma.    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    Pharmacy has monitored for medication interactions and immunosuppression levels in conjunction with the multidisciplinary team. In anticipation for discharge, medication therapy needs have been addressed daily throughout the current admission via multidisciplinary rounds and/or discussions, order verification, daily clinical pharmacy review, and communication with prescribers.  Rishi Orta PharmD, BCPS  Inpatient clinical pharmacist

## 2019-08-12 NOTE — PLAN OF CARE
BP (!) 148/95 (BP Location: Right arm)   Pulse 96   Temp 98.1  F (36.7  C) (Oral)   Resp 16   Ht 1.524 m (5')   Wt 57.7 kg (127 lb 3.2 oz)   SpO2 97%   BMI 24.84 kg/m      [id number]: Admitted 8/2/19. POD #8 s/p DCD kidney transplant c/o delayed   graft function. R low abdomen incision open to air and approximated with liquid   bandage. Receiving Tylenol and Dilaudid for pain as needed. Regular diet but   poor appetite. Ate few bites of Corn Flake cereal with milk for dinner. Calorie   counting. Voiding spontaneously; c/o burning sensation and frequency of urinate.   Pt reminded that she did get Bumex tonight but she questions whether she has a   UTI. Pt had UA 3days ago and was started on Cipro PO tabs for 2 days before it   was discontinued today; cultures back. Bladder scanned to r/o retention; only   scanned for 21 ml. Urine color is now bird whereas it had been red color. Did   not c/o bladder spasms but meds are available for this issue. Declined   miconazole vaginal suppository; states she only had itchy sensation when she had   a Ramirez catheter in place but once it was removed her symptoms went away. Large   BM tonight after Miralax was given. R PIV SL'd. Triple lumen R neck IJ; dressing   due to be changed; asked float-float RN to please come change. L arm HD fistula.   AVSS on RA. Has not been using O2 with activity this evening. Up with SBA and   walker. Anxious at times; atarax x1 this evening. Continue to monitor and follow   POC.

## 2019-08-12 NOTE — PLAN OF CARE
"BP (!) 139/91 (BP Location: Right arm)   Pulse 96   Temp 97.8  F (36.6  C) (Oral)   Resp 16   Ht 1.524 m (5')   Wt 58.2 kg (128 lb 4.8 oz)   SpO2 96%   BMI 25.06 kg/m      [id number]. Hypertensive. OVSS on RA. Denies nausea. PRN dilaudid + tylenol given for pain. Atarax given x 1. Pt complaining of cramping with concern for \"hyperkalemia\", on-call saw pt @ bedside. Awaiting results of am lab draw. RIJ & PIV both SL. Poor appetite, regular diet w/ calorie counts. Voiding small amounts, frequently-not saving. No BM overnight. Incision w/ liquid bandage. Up ad aron. Will continue to monitor and update with any changes.   "

## 2019-08-12 NOTE — PROGRESS NOTES
Transplant Surgery  Inpatient Daily Progress Note  08/12/2019    Assessment & Plan: 27yo woman with history of ESKD 2/2 IgA nephropathy on HD since 12/2013 via LUE AVF MWF. Other PMH includes SAH in 2014 while on Coumadin and subsequent seizure x2, hypothyroidism, chronic ITP, hyperprolactinemia 2/2 to pituitary adenoma. S/p DCD DDKT with 3 renal arteries on 8/3/19.     Graft function: POD #9. DGF.  Kidney: Cr 8->9.6. UOP increasing. Several unmeasured voids.  -POD0 K 6.4, improved s/p dialysis.  -POD1 dialysis for hypervolemia, hypertension, tachypneic  -POD4 dialysis d/t hypervolemia and nausea  -POD7 dialysis  -8/10 renogram: consistent with ATN     Immunosuppression management:   Thymoglobulin total of 325 mg or 6mg/kg.  Solu-Medrol 500mg intra-op/250mg/100mg  Tacrolimus 5mg BID (increased 8/11 from 4.5)  Cellcept 750mg BID     Hematology:   Anemia: HGB 7.1->6.7. Check iron and transfuse again today. Last RBC transfusion 8/6.   Thrombocytopenia: D/t thymo. Now resolved.  Methemoglobinemia: 8/9 patient with blue discoloration of lips, shortness of breath, methemoglobin level 10.1. Discontinued Dapsone.     Cardiorespiratory:  Chest pressure: Repeated episodes. POD1 Difficulty breathing, chest fullness, leg tightness in evening. BNP elevated, weight up otherwise labs, troponin, EKG, BLE US negative. Improved with dialysis. Onset again on 8/7, work up negative. Again improved with dialysis. 8/11 US neg for DVT.  Aspirin 81 (graft with 3 arteries)  Atorvastatin 10mg  Amlodipine 10mg  Hydralazine PRN     GI/Nutrition:   Nausea: Nausea for first ~6 days post-op. Now improving and able to eat.   Constipation, improved:  miralax PRN, senna PRN  Diet: yesterday kcal 723 PLUS meal from home that was not counted, continuing to encourage PO intake.     Endocrine:   Steroid hyperglycemia: Resolved.  Hx hypothyroidism: Levothyroxine 37.5 mcg     Fluid/Electrolytes:   Hyperkalemia post-op: K 6.4 post-op, required emergent  dialysis. K now normal.   Hypervolemia: On bumex BID  Hyponatremia: D/t hypervolemia.     :   Bladder spasms: B&O suppository PRN, Ditropan PRN  Vaginal yeast infection: Pt reports symptoms and discharge. On miconazole suppositories.     Infectious disease: Afebrile but WBC rising  R/o UTI: Repeat UA today. Last UC negative.    Psychiatry:  Anxiety: surrounding health, psychiatry consulted. Health psychology also consulted.  Atarax 10mg in am, 25mg in pm     Prophylaxis:   IS, SCDs, valcyte, ordered pentamidine neb (d/c'd dapsone 2/2 methemoglobinemia)    Medical Decision Making: Medium  Subsequent visit 47197 (moderate level decision making)    Resident: Ondina Deleon NP [id number]    Faculty: Angie Durbin MD   _________________________________________________________________  Transplant History: Admitted 8/2/2019 for kidney transplant.  8/3/2019 (Kidney), Postoperative day: 9     Interval History:   Muscle cramps overnight. +dysuria. Voiding larger amounts more frequently.    ROS:   A 10-point review of systems was negative except as noted above.    Meds:    albuterol  2.5 mg Nebulization Once    Followed by     pentamidine  300 mg Inhalation Once     amLODIPine  10 mg Oral Daily     aspirin  81 mg Oral Daily     atorvastatin  10 mg Oral Daily     bumetanide  4 mg Intravenous BID     levothyroxine  37.5 mcg Oral Q Mon Wed Fri AM     levothyroxine  25 mcg Oral Once per day on Sun Tue Thu Sat     miconazole  100 mg Vaginal At Bedtime     multivitamin RENAL  1 capsule Oral Daily     mycophenolate  750 mg Oral BID IS     sevelamer  2,400 mg Oral TID w/meals     sodium chloride (PF)  10 mL Intracatheter Q8H     tacrolimus  5 mg Oral BID IS     valGANciclovir  450 mg Oral Once per day on Mon Thu       Physical Exam:     Admit Weight: 54 kg (119 lb 0.8 oz)  Today's weight: 128 lbs 4.8 oz    Vital sign ranges:    Temp:  [97.7  F (36.5  C)-98.9  F (37.2  C)] 98.7  F (37.1  C)  Heart Rate:  [] 100  Resp:  [16-18]  18  BP: (132-151)/(78-99) 141/90  SpO2:  [96 %-98 %] 98 %    GENERAL: NAD  SKIN: No jaundice. No rashes or lesions.   HEENT: Eyes symmetrical and free of discharge bilaterally.  CV: well perfused  RESPIRATORY: Respirations regular, even, and unlabored  GI:soft, non-distended, non-tender. Incision with dermabond, C/D/I.  : No brooks  EXTREMITIES: trace BLE edema.   NEUROLOGIC: Alert and orientated x 4. No focal deficits.   MUSCULOSKELETAL: No joint swelling or tenderness.     Data:   CMP  Recent Labs   Lab 08/12/19  0619 08/11/19  0606   * 133   POTASSIUM 4.2 4.0   CHLORIDE 97 98   CO2 23 22   GLC 85 81   BUN 62* 46*   CR 9.56* 7.97*   GFRESTIMATED 5* 6*   GFRESTBLACK 6* 7*   SHAMIR 9.6 9.8   MAG 2.1 2.1   PHOS 6.1* 5.9*     CBC  Recent Labs   Lab 08/12/19 0619 08/11/19  0606   HGB 6.7* 7.1*   WBC 10.1 8.1    168     COAGSNo lab results found in last 7 days.    Invalid input(s): XA   Urinalysis  Recent Labs   Lab Test 08/08/19  1515 12/11/13  1915 12/10/13  2050   COLOR Red  --  Straw   APPEARANCE Cloudy  --  Clear   URINEGLC Negative  --  70*   URINEBILI Negative  --  Negative   URINEKETONE Negative  --  Negative   SG 1.015  --  1.008   UBLD Large*  --  Small*   URINEPH 6.0  --  6.5   PROTEIN 300*  --  100*   NITRITE Negative  --  Negative   LEUKEST Moderate*  --  Negative   RBCU >182*  --  2   WBCU 9*  --  9*   UTPG  --  7.42*  --      Virology:  CMV IgG Antibody   Date Value Ref Range Status   12/26/2013 >10.00  Positive for anti-CMV IgG U/mL Final     EBV VCA IgG Antibody   Date Value Ref Range Status   12/26/2013 57.80 U/mL Final     Comment:     Positive, suggests immunologic exposure.     Hepatitis C Antibody   Date Value Ref Range Status   08/02/2019 Nonreactive NR^Nonreactive Final     Comment:     Assay performance characteristics have not been established for newborns,   infants, and children       Hep B Surface Noemy   Date Value Ref Range Status   12/12/2013 34.4  Final     Comment:      Reactive, Patient is considered to be immune to infection with hepatitis B   when   the value is greater than or equal to 12.0 mlU/mL.

## 2019-08-13 ENCOUNTER — DOCUMENTATION ONLY (OUTPATIENT)
Dept: CARE COORDINATION | Facility: CLINIC | Age: 29
End: 2019-08-13

## 2019-08-13 ENCOUNTER — INFUSION THERAPY VISIT (OUTPATIENT)
Dept: INFUSION THERAPY | Facility: CLINIC | Age: 29
DRG: 652 | End: 2019-08-13
Attending: INTERNAL MEDICINE
Payer: MEDICARE

## 2019-08-13 ENCOUNTER — OFFICE VISIT (OUTPATIENT)
Dept: PHARMACY | Facility: CLINIC | Age: 29
End: 2019-08-13
Payer: MEDICAID

## 2019-08-13 VITALS
TEMPERATURE: 98 F | HEART RATE: 99 BPM | SYSTOLIC BLOOD PRESSURE: 163 MMHG | DIASTOLIC BLOOD PRESSURE: 103 MMHG | OXYGEN SATURATION: 99 % | RESPIRATION RATE: 18 BRPM

## 2019-08-13 DIAGNOSIS — Z48.298 AFTERCARE FOLLOWING ORGAN TRANSPLANT: ICD-10-CM

## 2019-08-13 DIAGNOSIS — E55.9 VITAMIN D DEFICIENCY: ICD-10-CM

## 2019-08-13 DIAGNOSIS — N18.6 ESRD (END STAGE RENAL DISEASE) ON DIALYSIS (H): ICD-10-CM

## 2019-08-13 DIAGNOSIS — Z99.2 ANEMIA IN CHRONIC KIDNEY DISEASE, ON CHRONIC DIALYSIS (H): ICD-10-CM

## 2019-08-13 DIAGNOSIS — Z94.0 KIDNEY TRANSPLANTED: Primary | ICD-10-CM

## 2019-08-13 DIAGNOSIS — I15.1 HTN, KIDNEY TRANSPLANT RELATED: ICD-10-CM

## 2019-08-13 DIAGNOSIS — Z94.0 KIDNEY REPLACED BY TRANSPLANT: ICD-10-CM

## 2019-08-13 DIAGNOSIS — N25.81 SECONDARY RENAL HYPERPARATHYROIDISM (H): ICD-10-CM

## 2019-08-13 DIAGNOSIS — Z99.2 ESRD (END STAGE RENAL DISEASE) ON DIALYSIS (H): ICD-10-CM

## 2019-08-13 DIAGNOSIS — R52 PAIN: ICD-10-CM

## 2019-08-13 DIAGNOSIS — T86.19 DELAYED GRAFT FUNCTION OF KIDNEY: ICD-10-CM

## 2019-08-13 DIAGNOSIS — Z94.0 HTN, KIDNEY TRANSPLANT RELATED: ICD-10-CM

## 2019-08-13 DIAGNOSIS — D84.9 IMMUNOSUPPRESSION (H): ICD-10-CM

## 2019-08-13 DIAGNOSIS — Z94.0 KIDNEY REPLACED BY TRANSPLANT: Primary | ICD-10-CM

## 2019-08-13 DIAGNOSIS — D63.1 ANEMIA IN CHRONIC KIDNEY DISEASE, ON CHRONIC DIALYSIS (H): ICD-10-CM

## 2019-08-13 DIAGNOSIS — N18.6 ANEMIA IN CHRONIC KIDNEY DISEASE, ON CHRONIC DIALYSIS (H): ICD-10-CM

## 2019-08-13 PROBLEM — R11.0 NAUSEA: Status: RESOLVED | Noted: 2019-08-08 | Resolved: 2019-08-13

## 2019-08-13 LAB
ANION GAP SERPL CALCULATED.3IONS-SCNC: 10 MMOL/L (ref 3–14)
BACTERIA SPEC CULT: NO GROWTH
BASOPHILS # BLD AUTO: 0 10E9/L (ref 0–0.2)
BASOPHILS NFR BLD AUTO: 0.3 %
BUN SERPL-MCNC: 72 MG/DL (ref 7–30)
CALCIUM SERPL-MCNC: 9.5 MG/DL (ref 8.5–10.1)
CHLORIDE SERPL-SCNC: 96 MMOL/L (ref 94–109)
CO2 SERPL-SCNC: 23 MMOL/L (ref 20–32)
CREAT SERPL-MCNC: 10.7 MG/DL (ref 0.52–1.04)
DIFFERENTIAL METHOD BLD: ABNORMAL
EOSINOPHIL # BLD AUTO: 0.1 10E9/L (ref 0–0.7)
EOSINOPHIL NFR BLD AUTO: 1.1 %
ERYTHROCYTE [DISTWIDTH] IN BLOOD BY AUTOMATED COUNT: 13 % (ref 10–15)
GFR SERPL CREATININE-BSD FRML MDRD: 4 ML/MIN/{1.73_M2}
GLUCOSE SERPL-MCNC: 128 MG/DL (ref 70–99)
HCT VFR BLD AUTO: 23.5 % (ref 35–47)
HGB BLD-MCNC: 8.2 G/DL (ref 11.7–15.7)
IMM GRANULOCYTES # BLD: 0.1 10E9/L (ref 0–0.4)
IMM GRANULOCYTES NFR BLD: 1.3 %
LYMPHOCYTES # BLD AUTO: 0.2 10E9/L (ref 0.8–5.3)
LYMPHOCYTES NFR BLD AUTO: 1.8 %
Lab: NORMAL
MAGNESIUM SERPL-MCNC: 2.1 MG/DL (ref 1.6–2.3)
MCH RBC QN AUTO: 32 PG (ref 26.5–33)
MCHC RBC AUTO-ENTMCNC: 34.9 G/DL (ref 31.5–36.5)
MCV RBC AUTO: 92 FL (ref 78–100)
MONOCYTES # BLD AUTO: 0.3 10E9/L (ref 0–1.3)
MONOCYTES NFR BLD AUTO: 3.2 %
NEUTROPHILS # BLD AUTO: 8.6 10E9/L (ref 1.6–8.3)
NEUTROPHILS NFR BLD AUTO: 92.3 %
NRBC # BLD AUTO: 0 10*3/UL
NRBC BLD AUTO-RTO: 0 /100
PHOSPHATE SERPL-MCNC: 6.6 MG/DL (ref 2.5–4.5)
PLATELET # BLD AUTO: 198 10E9/L (ref 150–450)
POTASSIUM SERPL-SCNC: 3.8 MMOL/L (ref 3.4–5.3)
RBC # BLD AUTO: 2.56 10E12/L (ref 3.8–5.2)
SODIUM SERPL-SCNC: 128 MMOL/L (ref 133–144)
SPECIMEN SOURCE: NORMAL
TACROLIMUS BLD-MCNC: 6.1 UG/L (ref 5–15)
TME LAST DOSE: NORMAL H
WBC # BLD AUTO: 9.3 10E9/L (ref 4–11)

## 2019-08-13 PROCEDURE — 84100 ASSAY OF PHOSPHORUS: CPT | Performed by: INTERNAL MEDICINE

## 2019-08-13 PROCEDURE — 99607 MTMS BY PHARM ADDL 15 MIN: CPT | Performed by: PHARMACIST

## 2019-08-13 PROCEDURE — 80048 BASIC METABOLIC PNL TOTAL CA: CPT | Performed by: INTERNAL MEDICINE

## 2019-08-13 PROCEDURE — 85025 COMPLETE CBC W/AUTO DIFF WBC: CPT | Performed by: INTERNAL MEDICINE

## 2019-08-13 PROCEDURE — 36415 COLL VENOUS BLD VENIPUNCTURE: CPT | Mod: ZF

## 2019-08-13 PROCEDURE — 80197 ASSAY OF TACROLIMUS: CPT | Performed by: INTERNAL MEDICINE

## 2019-08-13 PROCEDURE — 83735 ASSAY OF MAGNESIUM: CPT | Performed by: INTERNAL MEDICINE

## 2019-08-13 PROCEDURE — 99605 MTMS BY PHARM NP 15 MIN: CPT | Performed by: PHARMACIST

## 2019-08-13 RX ORDER — CHOLECALCIFEROL (VITAMIN D3) 50 MCG
1 TABLET ORAL DAILY
Qty: 90 TABLET | Refills: 3 | Status: SHIPPED | OUTPATIENT
Start: 2019-08-13 | End: 2019-10-07

## 2019-08-13 RX ORDER — OXYCODONE HYDROCHLORIDE 5 MG/1
2.5 TABLET ORAL EVERY 8 HOURS PRN
Qty: 12 TABLET | Refills: 0 | Status: SHIPPED | OUTPATIENT
Start: 2019-08-13 | End: 2019-08-23

## 2019-08-13 NOTE — PROGRESS NOTES
SUBJECTIVE/OBJECTIVE:                Mona Wagner is a 28 year old female coming in for a transitions of care visit.  She was discharged from [facility] on yesterday for kidney txp.     Chief Complaint: Took hydroxyzine and Dilaudid- felt very heavy and very weak. Could not move. Had the same feeling when taking this at home.     Allergies/ADRs: Reviewed in Epic  Tobacco: No tobacco use   Alcohol: not currently using  Caffeine: no caffeine  PMH: Reviewed in Epic    Medication Adherence/Access:  Patient uses pill box(es) and uses reminders/alarms.  Patient takes medications 2 time(s) per day. 9am, 9pm  Per patient, misses medication 0 times per week.   Medication barriers: none.   The patient fills medications at [place]: NO, fills medications at [facility], she worked there.  Pt seen in [facility] today: yes  Patient seen in hospital by RPH: yes  Patient receive supplies: yes  Stools: Having 2 BMs daily, 1 BM so far today.   Reviewed Anti-rejection doses with bottles: yes    Renal Transplant:  Current immunosuppressants include Tacrolimus 5.5mg BID (took this dose last night, med card says 5mg though)(0-6 months post tx, goal 8-10), MMF 750mg BID.  Pt reports no side effects  Transplant date: 8/3/19  Estimated Creatinine Clearance: 7.1 mL/min (A) (based on SCr of 9.56 mg/dL (H)).  CMV prophylaxis: CrCl 10 to 24 mL/minute: Valcyte 450 mg twice weekly Treat 3 months post tx   PCP prophylaxis: Inhaled Pentamidine 300mg q 4 weeks, due to issues with hyperkalemia  Current supplements for electrolyte replacement: Pt was taking Renvela in the hospital, but it was causing stomach cramping.   Tx Coordinator: Hannah Polo MD: Dr. Horowitz, Using Med Card: Yes  Skin Care: discussed  Immunizations: annual flu shot UTD; Sofupicecc64:  2018; Prevnar 13: unknown; TDaP:  9/2009; Shingrix: unknown    Pain: Pt is taking APAP 325-650mg q4 hours, 8/10, after taking APAP 6/10. Pain increases on movement/ getting out of bed etc. Has had side effects  whenever she takes Dilaudid. Oxycodone has worked well for her in the past without side effects.     Birth Control post transplant: Pt is wondering about oral birth control options post txp. Pt prefers oral therapies. Prior to txp was taking progesterone only oral therapy.     Today's Vitals: There were no vitals taken for this visit.    ASSESSMENT:                 Current medications were reviewed today.    Medication Adherence: good, no issues identified    Renal Transplant: Pt instructed to restart Renvela as her phosphorus levels have been elevated. If it causes cramping, at least try to get in 1 pill with meals.     Pain: Pt was switched to oxycodone today by txp team.     Birth Control post transplant:: IUD and implants are preferred forms of BC, but patient prefers oral. I do not recommend using estrogen containing products for the first 6 weeks post transplant due to clot risk. In that time I would recommend using the Progestin only pill she was taking prior to transplant once okayed by txp team + barrier method.     PLAN:                Post Discharge Medication Reconciliation Status: discharge medications reconciled and changed, per note/orders (see AVS).    Pt to...  1. Start taking Renvela with meals again.  2. Concerning birth control- IUD or implant would be preferred long term. Avoid estrogen containing birth controls at least 6 months post transplant. Mycophenolate may decrease the effectiveness of oral birth controls, so using a barrier method on top of oral birth control is recommended.      I spent 30 minutes with this patient today. I offer these suggestions for consideration by txp team. A copy of the visit note was provided to the patient's referring provider.    Will follow up in 2 months.    The patient was given a summary of these recommendations as an after visit summary.    Anthony Hendricks, PharmD  [facility] Pharmacist    Phone: [phone]

## 2019-08-13 NOTE — PROGRESS NOTES
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 Last HD on 8/9. U/S 8/8 without hydro and patent vasculature and Renogram 8/10 consistent with ATN. UOP improving, but anephric rise in creatinine. Will plan to re evaluate tmrw as she is having increased UOP.    - Baseline Cr ~ TBD;              - Proteinuria: Not checked post transplant              - Date DSA Last Checked: 8/2/2019        DSA: No   - BK Viremia: Not checked post transplant   - Kidney Transplant Biopsy: No     # Immunosuppression: Tacrolimus immediate release (goal 8-10) and Mycophenolate mofetil (goal 1-3.5)              - Changes: No, adjust Tac pending levels. Last took 5.5 mg     # Prophylaxis:              - PJP: no bactrim due to allergy, no dapsone due to methemoglobinemia; plan for inhaled pentamidine as OP.              - CMV: Valcyte 450 q M-Th     # Hypertension: Borderline controlled; Goal BP: < 150/90              - Volume status:  Hypervolemic - 5kg above EDW of 53 kg              - Changes: No, Continue Norvasc 10mg and BID Bumex 4mg     # Anemia in Chronic Renal Disease: Hgb: Increased to 8.2 after PRBC         CHARLINE: No   -Iron Studies: Replete      # Mineral Bone Disorder:   - Secondary renal hyperparathyroidism; PTH level is elevated at 358 pg/mL              - Vitamin D level; low, start cholecal 2000 units daily.   - Calcium; level: normal              - Hyperphosphatemia: On sevelamer with meals, will continue with DGF    # Electrolytes   -Potassium; level: normal   -Magnesium; level: normal   -Bicarbonate; level: normal   -Sodium; level: low due to renal dysfunction/impaired free H2) excretion. Will trend.     # Dysuria: suspect related to previous brooks catheter, UCx from 8/8 negative, repeat Cx from 8/12 with NGTD. Will follow.    # Hypothyroidism: Last TSH 2.2. Continue on LevoT.    # Hyperprolactinemia: trending prolactins, no plans for surgical intervention per patient, plan for  ongoing monitoring with endocrine    # Anxiety: Improving    #Hx of Sub Arachnoid Hemorrhage while on Coumadin: No acute issues     # Transplant History:  Etiology of kidney failure: IgA nephropathy  Tx: DDKT  Transplant: 8/3/2019 (Kidney)  Donor Type:  - Cardiac Death        Donor Class: Standard Criteria Donor  Crossmatch at time of Tx: negative  Significant changes in immunosuppression: None  Significant transplant-related complications: None    Transplant Office Phone Number: [phone]    Assessment and plan was discussed with the patient and she voiced her understanding and agreement.    Return visit: Return in about 1 day (around 2019).     Pt staffed with Dr. Sergio Horowitz MD   [id number]    Attestation:  This patient has been seen and evaluated by me, Nakul Hill MD.  I have reviewed the note and agree with plan of care as documented by the fellow.       Chief Complaint   Ms. Wagner is a 28 year old here for hospital follow up following kidney transplant. 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 Energy level slowly improving, UOP increasing. Slowly increasing activity level. She does note feeling a bit swollen, and is sleeping on a bit of an incline. She states her pain control is okay, could be better. She felt that Dilaudid really made her weak, groggy - and thus would like an alternative.     Recent Hospitalizations:  [] No [x] Yes DDKT   New Medical Issues: [] No [x] Yes DDKT   Decreased energy: [] No [x] Yes Improving   Chest pain or SOB with exertion:  [x] No [] Yes    Appetite change or weight change: [] No [x] Yes Appetite improving   Nausea, vomiting or diarrhea:  [x] No [] Yes    Fever, sweats or chills: [x] No [] Yes     Leg swelling: [] No [x] Yes Minimal     Home BP: Not checked    Review of Systems   A comprehensive review of systems was obtained and negative, except as noted in the HPI or PMH.    Problem List   Patient Active Problem List   Diagnosis     ESRD (end stage renal disease) on dialysis (H)     DVT of upper extremity (deep vein thrombosis) (H)     Seizure disorder (H)     HTN, kidney transplant related     Other general symptoms(780.99)     Galactorrhea     Acquired hypothyroidism     Anemia in chronic kidney disease     Increased prolactin level (H)     Normocytic anemia     Thrombocytopenia (H)     Infective endocarditis     Aftercare following organ transplant     Immunosuppression (H)     Delayed graft function of kidney     Methemoglobinemia     Kidney replaced by transplant     Anxiety     Secondary renal hyperparathyroidism (H)     Vitamin D deficiency       Social History   Social History     Tobacco Use     Smoking status: Never Smoker     Smokeless tobacco: Never Used   Substance Use Topics     Alcohol use: No     Alcohol/week: 0.0 oz     Drug use: No       Allergies   Allergies   Allergen Reactions     Chlorhexidine Rash     Rash at site     Sulfa Drugs Rash     Muscle stiffness of neck     Dapsone      Methemoglobinemia     Furosemide Other (See Comments)     Skin flushing     Lisinopril Swelling     angioedema     Alcohol Swabs [Isopropyl Alcohol] Rash       Medications   Current Outpatient Medications   Medication Sig     acetaminophen (TYLENOL) 325 MG tablet Take 2 tablets (650 mg) by mouth every 4 hours as needed for mild pain     amLODIPine (NORVASC) 10 MG tablet Take 1 tablet (10 mg) by mouth daily     aspirin (ASA) 81 MG chewable tablet Take 1 tablet (81 mg) by mouth daily     atorvastatin (LIPITOR) 10 MG tablet Take 1 tablet (10 mg) by mouth daily     bumetanide (BUMEX) 2 MG tablet Take 2 tablets (4 mg) by mouth 2 times daily for 5 days     diphenhydrAMINE (BENADRYL) 25 MG tablet Take 1-2 tablets  (25-50 mg) by mouth every 6 hours as needed for itching or allergies     docusate sodium (COLACE) 100 MG tablet Take 1 tablet (100 mg) by mouth daily as needed for constipation     EPINEPHrine (EPIPEN/ADRENACLICK/OR ANY BX GENERIC EQUIV) 0.3 MG/0.3ML injection 2-pack Inject 0.3 mLs (0.3 mg) into the muscle as needed for anaphylaxis (Patient not taking: Reported on 5/14/2019)     HYDROmorphone (DILAUDID) 2 MG tablet Take 0.5 tablets (1 mg) by mouth every 4 hours as needed for moderate to severe pain     hydrOXYzine (ATARAX) 10 MG tablet Take 1 tablet (10 mg) by mouth 3 times daily as needed for anxiety     levothyroxine (SYNTHROID/LEVOTHROID) 25 MCG tablet Take 1 tablet (25 mcg) Tuesday, Thursday, Saturday and Sunday and 1.5 tablets (37.5 mcg) on Monday, Wednesday and Friday every week.     multivitamin RENAL (NEPHROCAPS/TRIPHROCAPS) 1 MG capsule Take 1 capsule by mouth daily     mycophenolate (GENERIC EQUIVALENT) 250 MG capsule Take 3 capsules (750 mg) by mouth 2 times daily     norethindrone (MICRONOR) 0.35 MG tablet Do not restart until your follow up appointment with your surgeon. Discuss restart date at that appointment.     ondansetron (ZOFRAN ODT) 4 MG ODT tab Take 1-2 tablets (4-8 mg) by mouth every 8 hours as needed for nausea     pentamidine (NEBUPENT) 300 MG neb solution Inhale 300 mg into the lungs every 28 days     polyethylene glycol (MIRALAX/GLYCOLAX) powder Take 17 g (1 capful) by mouth daily as needed for constipation     sevelamer (RENVELA) 800 MG tablet Take 1600 mg by mouth three times daily with meals AND Take 800 mg by mouth with snacks.     simethicone (MYLICON) 125 MG chewable tablet Take 1 tablet (125 mg) by mouth 4 times daily as needed for intestinal gas     tacrolimus (GENERIC EQUIVALENT) 0.5 MG capsule Take 1 cap by mouth twice daily as directed by Transplant Center for dose adjustment     tacrolimus (GENERIC EQUIVALENT) 1 MG capsule Take 5 capsules (5 mg) by mouth 2 times daily      valGANciclovir (VALCYTE) 450 MG tablet Take 1 tab every Monday and Thursday. Titrate dose up to a max of 2 tabs (900 mg) by mouth daily when directed by your transplant team.     No current facility-administered medications for this visit.      There are no discontinued medications.    Physical Exam   Vital Signs: Reviewed    GENERAL APPEARANCE: alert and no distress  HENT: mouth without ulcers or lesions  LYMPHATICS: no cervical or supraclavicular nodes  RESP: lungs clear to auscultation - no wheezes  CV: regular rhythm, normal rate  EDEMA: trace LE edema bilaterally  ABDOMEN: soft, nondistended, nontender, bowel sounds normal  MS: extremities normal - no gross deformities noted  SKIN: no rash  TX KIDNEY: Well healed incision  DIALYSIS ACCESS: LUE AVF with good thrill    Data     Renal Latest Ref Rng & Units 8/12/2019 8/11/2019 8/10/2019   Na 133 - 144 mmol/L 129(L) 133 133   Na (external) 134 - 145 mEq/L - - -   K 3.4 - 5.3 mmol/L 4.2 4.0 4.3   K (external) 3.5 - 5.1 mEq/L - - -   Cl 94 - 109 mmol/L 97 98 100   Cl (external) 98 - 107 mEq/L - - -   CO2 20 - 32 mmol/L 23 22 26   CO2 (external) 21 - 31 mEq/L - - -   BUN 7 - 30 mg/dL 62(H) 46(H) 31(H)   BUN (external) mg/dL - - -   Cr 0.52 - 1.04 mg/dL 9.56(H) 7.97(H) 5.64(H)   Cr (external) 0.60 - 1.20 mg/dL - - -   Glucose 70 - 99 mg/dL 85 81 82   Ca  8.5 - 10.1 mg/dL 9.6 9.8 9.8   Ca (external) 8.6 - 10.2 mg/dL - - -   Mg 1.6 - 2.3 mg/dL 2.1 2.1 1.9     Bone Health Latest Ref Rng & Units 8/12/2019 8/11/2019 8/10/2019   Phos 2.5 - 4.5 mg/dL 6.1(H) 5.9(H) 4.4   Phos (external) 2.5 - 6.0 mg/dL - - -   PTHi 18 - 80 pg/mL - - -   PTHi (external) 18 - 80 pg/mL - - -   Vit D Def 20 - 75 ug/L - - -     Heme Latest Ref Rng & Units 8/12/2019 8/11/2019 8/10/2019   WBC 4.0 - 11.0 10e9/L 10.1 8.1 6.3   WBC (external) 4.5 - 11.0 x10:3/uL - - -   Hgb 11.7 - 15.7 g/dL 6.7(LL) 7.1(L) 7.3(L)   Hgb (external) 12.0 - 16.0 g/dL - - -   Plt 150 - 450 10e9/L 168 168 120(L)   Plt  (external) 150 - 400 x10:3/uL - - -     Liver Latest Ref Rng & Units 8/2/2019 1/15/2019 1/6/2019   AP 40 - 150 U/L 65 - 63   AP (external) 34 - 104 U/L - - -   TBili 0.2 - 1.3 mg/dL 0.8 0.9 0.7   ALT 0 - 50 U/L 13 - 14   ALT (external) 7 - 52 U/L - - -   AST 0 - 45 U/L 4 - 8   AST (external) 13 - 39 U/L - - -   Tot Protein 6.8 - 8.8 g/dL 8.6 7.3 7.5   Tot Protein (external) 6.4 - 8.9 g/dL - - -   Albumin 3.4 - 5.0 g/dL 4.2 3.5 3.5   Albumin (external) - - - -     Pancreas Latest Ref Rng & Units 8/3/2019 8/2/2019   A1C 0 - 5.6 % 4.8 4.8     Iron studies Latest Ref Rng & Units 8/12/2019 7/9/2018 12/29/2016   Iron 35 - 180 ug/dL 96 - 137   Iron sat 15 - 46 % 59(H) - 60(H)   Ferritin 12 - 150 ng/mL - - 1,039(H)   Ferritin (external) 10 - 291 ng/mL - 1,557(H) -     [facility] Txp Virology 12/12/2013 12/11/2013   Hep B Surf 34.4 30.6        Recent Labs   Lab Test 08/07/19  0541 08/09/19  0559 08/11/19  0606   DOSTAC Not Provided Not Provided Not Provided   TACROL 3.9* 3.8* 5.1

## 2019-08-13 NOTE — LETTER
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 Last HD on 8/9. U/S 8/8 without hydro and patent vasculature and Renogram 8/10 consistent with ATN. UOP improving, but anephric rise in creatinine. Will plan to re evaluate tmrw as she is having increased UOP.    - Baseline Cr ~ TBD;              - Proteinuria: Not checked post transplant              - Date DSA Last Checked: 8/2/2019        DSA: No   - BK Viremia: Not checked post transplant   - Kidney Transplant Biopsy: No     # Immunosuppression: Tacrolimus immediate release (goal 8-10) and Mycophenolate mofetil (goal 1-3.5)              - Changes: No, adjust Tac pending levels. Last took 5.5 mg     # Prophylaxis:              - PJP: no bactrim due to allergy, no dapsone due to methemoglobinemia; plan for inhaled pentamidine as OP.              - CMV: Valcyte 450 q M-Th     # Hypertension: Borderline controlled; Goal BP: < 150/90              - Volume status:  Hypervolemic - 5kg above EDW of 53 kg              - Changes: No, Continue Norvasc 10mg and BID Bumex 4mg     # Anemia in Chronic Renal Disease: Hgb:  Increased to 8.2 after PRBC         CHARLINE: No   -Iron Studies: Replete      # Mineral Bone Disorder:   - Secondary renal hyperparathyroidism; PTH level is elevated at 358 pg/mL              - Vitamin D level; low, start cholecal 2000 units daily.   - Calcium; level: normal              - Hyperphosphatemia:  On sevelamer with meals, will continue with DGF    # Electrolytes   -Potassium; level: normal   -Magnesium; level: normal   -Bicarbonate; level: normal   -Sodium; level: low due to renal dysfunction/impaired free H2) excretion. Will trend.     # Dysuria: suspect related to previous brooks catheter, UCx from 8/8 negative, repeat Cx from 8/12 with NGTD. Will follow.    # Hypothyroidism: Last TSH 2.2. Continue on LevoT.    # Hyperprolactinemia:  trending prolactins, no plans for surgical intervention per patient, plan for ongoing monitoring with endocrine    # Anxiety: Improving    #Hx of Sub Arachnoid Hemorrhage while on Coumadin: No acute issues     # Transplant History:  Etiology of kidney failure: IgA nephropathy  Tx: DDKT  Transplant: 8/3/2019 (Kidney)  Donor Type:  - Cardiac Death        Donor Class: Standard Criteria Donor  Crossmatch at time of Tx: negative  Significant changes in immunosuppression: None  Significant transplant-related complications: None    Transplant Office Phone Number: [phone]    Assessment and plan was discussed with the patient and she voiced her understanding and agreement.    Return visit: Return in about 1 day (around 2019).     Pt staffed with Dr. Sergio Horowitz MD   [id number]    Attestation:  This patient has been seen and evaluated by me, Nakul Hill MD.  I have reviewed the note and agree with plan of care as documented by the fellow.       Chief Complaint   Ms. Wagner is a 28 year old here for hospital follow up following kidney transplant. 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 Energy level slowly improving, UOP increasing. Slowly increasing activity level. She does note feeling a bit swollen, and is sleeping on a bit of an incline. She states her pain control is okay, could be better. She felt that Dilaudid really made her weak, groggy - and thus would like an alternative.     Recent Hospitalizations:  [] No [x] Yes DDKT   New Medical Issues: [] No [x] Yes DDKT   Decreased energy: [] No [x] Yes Improving   Chest pain or SOB with exertion:  [x] No [] Yes    Appetite change or weight change: [] No [x] Yes Appetite improving   Nausea,  vomiting or diarrhea:  [x] No [] Yes    Fever, sweats or chills: [x] No [] Yes    Leg swelling: [] No [x] Yes Minimal     Home BP: Not checked    Review of Systems   A comprehensive review of systems was obtained and negative, except as noted in the HPI or PMH.    Problem List   Patient Active Problem List   Diagnosis     ESRD (end stage renal disease) on dialysis (H)     DVT of upper extremity (deep vein thrombosis) (H)     Seizure disorder (H)     HTN, kidney transplant related     Other general symptoms(780.99)     Galactorrhea     Acquired hypothyroidism     Anemia in chronic kidney disease     Increased prolactin level (H)     Normocytic anemia     Thrombocytopenia (H)     Infective endocarditis     Aftercare following organ transplant     Immunosuppression (H)     Delayed graft function of kidney     Methemoglobinemia     Kidney replaced by transplant     Anxiety     Secondary renal hyperparathyroidism (H)     Vitamin D deficiency       Social History   Social History     Tobacco Use     Smoking status: Never Smoker     Smokeless tobacco: Never Used   Substance Use Topics     Alcohol use: No     Alcohol/week: 0.0 oz     Drug use: No       Allergies   Allergies   Allergen Reactions     Chlorhexidine Rash     Rash at site     Sulfa Drugs Rash     Muscle stiffness of neck     Dapsone      Methemoglobinemia     Furosemide Other (See Comments)     Skin flushing     Lisinopril Swelling     angioedema     Alcohol Swabs [Isopropyl Alcohol] Rash       Medications   Current Outpatient Medications   Medication Sig     acetaminophen (TYLENOL) 325 MG tablet Take 2 tablets (650 mg) by mouth every 4 hours as needed for mild pain     amLODIPine (NORVASC) 10 MG tablet Take 1 tablet (10 mg) by mouth daily     aspirin (ASA) 81 MG chewable tablet Take 1 tablet (81 mg) by mouth daily     atorvastatin (LIPITOR) 10 MG tablet Take 1 tablet (10 mg) by mouth daily     bumetanide (BUMEX) 2 MG tablet Take 2 tablets (4 mg) by mouth 2  times daily for 5 days     diphenhydrAMINE (BENADRYL) 25 MG tablet Take 1-2 tablets (25-50 mg) by mouth every 6 hours as needed for itching or allergies     docusate sodium (COLACE) 100 MG tablet Take 1 tablet (100 mg) by mouth daily as needed for constipation     EPINEPHrine (EPIPEN/ADRENACLICK/OR ANY BX GENERIC EQUIV) 0.3 MG/0.3ML injection 2-pack Inject 0.3 mLs (0.3 mg) into the muscle as needed for anaphylaxis (Patient not taking: Reported on 5/14/2019)     HYDROmorphone (DILAUDID) 2 MG tablet Take 0.5 tablets (1 mg) by mouth every 4 hours as needed for moderate to severe pain     hydrOXYzine (ATARAX) 10 MG tablet Take 1 tablet (10 mg) by mouth 3 times daily as needed for anxiety     levothyroxine (SYNTHROID/LEVOTHROID) 25 MCG tablet Take 1 tablet (25 mcg) Tuesday, Thursday, Saturday and Sunday and 1.5 tablets (37.5 mcg) on Monday, Wednesday and Friday every week.     multivitamin RENAL (NEPHROCAPS/TRIPHROCAPS) 1 MG capsule Take 1 capsule by mouth daily     mycophenolate (GENERIC EQUIVALENT) 250 MG capsule Take 3 capsules (750 mg) by mouth 2 times daily     norethindrone (MICRONOR) 0.35 MG tablet Do not restart until your follow up appointment with your surgeon. Discuss restart date at that appointment.     ondansetron (ZOFRAN ODT) 4 MG ODT tab Take 1-2 tablets (4-8 mg) by mouth every 8 hours as needed for nausea     pentamidine (NEBUPENT) 300 MG neb solution Inhale 300 mg into the lungs every 28 days     polyethylene glycol (MIRALAX/GLYCOLAX) powder Take 17 g (1 capful) by mouth daily as needed for constipation     sevelamer (RENVELA) 800 MG tablet Take 1600 mg by mouth three times daily with meals AND Take 800 mg by mouth with snacks.     simethicone (MYLICON) 125 MG chewable tablet Take 1 tablet (125 mg) by mouth 4 times daily as needed for intestinal gas     tacrolimus (GENERIC EQUIVALENT) 0.5 MG capsule Take 1 cap by mouth twice daily as directed by Transplant Center for dose adjustment     tacrolimus  (GENERIC EQUIVALENT) 1 MG capsule Take 5 capsules (5 mg) by mouth 2 times daily     valGANciclovir (VALCYTE) 450 MG tablet Take 1 tab every Monday and Thursday. Titrate dose up to a max of 2 tabs (900 mg) by mouth daily when directed by your transplant team.     No current facility-administered medications for this visit.      There are no discontinued medications.    Physical Exam   Vital Signs: Reviewed    GENERAL APPEARANCE: alert and no distress  HENT: mouth without ulcers or lesions  LYMPHATICS: no cervical or supraclavicular nodes  RESP: lungs clear to auscultation - no wheezes  CV: regular rhythm, normal rate  EDEMA: trace LE edema bilaterally  ABDOMEN: soft, nondistended, nontender, bowel sounds normal  MS: extremities normal - no gross deformities noted  SKIN: no rash  TX KIDNEY: Well healed incision  DIALYSIS ACCESS: LUE AVF with good thrill    Data     Renal Latest Ref Rng & Units 8/12/2019 8/11/2019 8/10/2019   Na 133 - 144 mmol/L 129(L) 133 133   Na (external) 134 - 145 mEq/L - - -   K 3.4 - 5.3 mmol/L 4.2 4.0 4.3   K (external) 3.5 - 5.1 mEq/L - - -   Cl 94 - 109 mmol/L 97 98 100   Cl (external) 98 - 107 mEq/L - - -   CO2 20 - 32 mmol/L 23 22 26   CO2 (external) 21 - 31 mEq/L - - -   BUN 7 - 30 mg/dL 62(H) 46(H) 31(H)   BUN (external) mg/dL - - -   Cr 0.52 - 1.04 mg/dL 9.56(H) 7.97(H) 5.64(H)   Cr (external) 0.60 - 1.20 mg/dL - - -   Glucose 70 - 99 mg/dL 85 81 82   Ca  8.5 - 10.1 mg/dL 9.6 9.8 9.8   Ca (external) 8.6 - 10.2 mg/dL - - -   Mg 1.6 - 2.3 mg/dL 2.1 2.1 1.9     Bone Health Latest Ref Rng & Units 8/12/2019 8/11/2019 8/10/2019   Phos 2.5 - 4.5 mg/dL 6.1(H) 5.9(H) 4.4   Phos (external) 2.5 - 6.0 mg/dL - - -   PTHi 18 - 80 pg/mL - - -   PTHi (external) 18 - 80 pg/mL - - -   Vit D Def 20 - 75 ug/L - - -     Heme Latest Ref Rng & Units 8/12/2019 8/11/2019 8/10/2019   WBC 4.0 - 11.0 10e9/L 10.1 8.1 6.3   WBC (external) 4.5 - 11.0 x10:3/uL - - -   Hgb 11.7 - 15.7 g/dL 6.7(LL) 7.1(L) 7.3(L)   Hgb  (external) 12.0 - 16.0 g/dL - - -   Plt 150 - 450 10e9/L 168 168 120(L)   Plt (external) 150 - 400 x10:3/uL - - -     Liver Latest Ref Rng & Units 8/2/2019 1/15/2019 1/6/2019   AP 40 - 150 U/L 65 - 63   AP (external) 34 - 104 U/L - - -   TBili 0.2 - 1.3 mg/dL 0.8 0.9 0.7   ALT 0 - 50 U/L 13 - 14   ALT (external) 7 - 52 U/L - - -   AST 0 - 45 U/L 4 - 8   AST (external) 13 - 39 U/L - - -   Tot Protein 6.8 - 8.8 g/dL 8.6 7.3 7.5   Tot Protein (external) 6.4 - 8.9 g/dL - - -   Albumin 3.4 - 5.0 g/dL 4.2 3.5 3.5   Albumin (external) - - - -     Pancreas Latest Ref Rng & Units 8/3/2019 8/2/2019   A1C 0 - 5.6 % 4.8 4.8     Iron studies Latest Ref Rng & Units 8/12/2019 7/9/2018 12/29/2016   Iron 35 - 180 ug/dL 96 - 137   Iron sat 15 - 46 % 59(H) - 60(H)   Ferritin 12 - 150 ng/mL - - 1,039(H)   Ferritin (external) 10 - 291 ng/mL - 1,557(H) -     [facility] Txp Virology 12/12/2013 12/11/2013   Hep B Surf 34.4 30.6        Recent Labs   Lab Test 08/07/19  0541 08/09/19  0559 08/11/19  0606   DOSTAC Not Provided Not Provided Not Provided   TACROL 3.9* 3.8* 5.1           Nakul Hill MD

## 2019-08-13 NOTE — PROGRESS NOTES
"Mona Wagner came to [facility] today for a lab and assess following a kidney transplant on 8/3/19.      Discharge date: 8/12/19  Transplant coordinator: Laurence Vazquez RN Coordinator  Phone number patient can be reached at: [phone] cell    Physical Assessment:  See physical assessment located under \"Document Flowsheets\".  Incision site: dermabond, Clean, dry and intact  Lines: EARL drain out  Ramirez: NA  Urine clarity: yellow lite, no red specks notice, urgency to pee every hour, UTI culture pending, patient still complaining of bladder spasms  Hydration: Patient was instructed to only quench thirst due to volume overload  Nutrition: eating small meals, appetite slowly getting better  Last BM: small BM soft today, loose yesterday  Pain: 8/10 incision, patient felt the dilaudid they gave her for pain made her feel heavy and unable to move her body, new script given by Geo Horowitz MD for oxycodone  Labs drawn by [facility] staff : no, vascular access needed, patient has fistula in left arm, only able to draw from right arm    Plan of care for today:   1.  Labs and assessment  2.  Patient met with Anthony Hendricks pharmacist  3.  Melani RN coordinator called and stated she would meet with patient on 8/14/19    Medication changes:   1.  Vitamin D3 (cholecalciferol) 2000 units (50 mcg) tab, take 1 daily  2.  New script for oxycodone due to undesirable reaction to dilaudid    Medications administered: none    Patient education:  The following teaching topics were addressed: Importance of drinking 2L of non-caffeinated fluids daily, Incisional care, Signs/symptoms of infection, Good handwashing, Medications (purposes, doses and times of administration), Phone numbers to call with concenrs (Transplant coordinator, [facility] and [facility]), 7A discharge check list and Plan of care   Patient and and , Jt verbalized understanding and all questions answered.    Drug level:  Prograf level 6.1 today reviewed with Dr" Spong who gave orders to change dose of 5 mg to 6 mg every 12 hours, patient took 5.5 mg last evening by mistake.  Patient was updated with this information and verbalized understanding.    Face to face time: 20 minutes    Discharge Plan  [facility] labs and assessment  Discharge instructions reviewed with patient: YES  Patient/Representative verbalized understanding, all questions answered: YES    Discharged from unit at 0945 with whom: , Jt to home.    Cris Payan RN

## 2019-08-13 NOTE — PATIENT INSTRUCTIONS
Recommendations from today's MTM visit:                                                      1. [employer] and [facility] [employer] are some apps you can use to track your medications.     2. I will contact you concerning birth control options long term.     It was great to speak with you today.  I value your experience and would be very thankful for your time with providing good feedback on clinic surveys.  Kindly let me know personally if your experience today was not exceptional so that we can continue to improve your care delivery experience.    Next MTM visit: 2 months post transplant    To schedule another MTM appointment, please call the clinic directly or you may call the MTM scheduling line at [phone] or toll-free at [phone].     My Clinical Pharmacist's contact information:                                                      It was a pleasure talking with you today!  Please feel free to contact me with any questions or concerns you have.      Anthony Hendricks, PharmD  [facility] Pharmacist    Phone: [phone]

## 2019-08-14 ENCOUNTER — OFFICE VISIT (OUTPATIENT)
Dept: INFUSION THERAPY | Facility: CLINIC | Age: 29
End: 2019-08-14
Attending: INTERNAL MEDICINE
Payer: MEDICARE

## 2019-08-14 ENCOUNTER — CARE COORDINATION (OUTPATIENT)
Dept: TRANSPLANT | Facility: CLINIC | Age: 29
End: 2019-08-14

## 2019-08-14 ENCOUNTER — TELEPHONE (OUTPATIENT)
Dept: TRANSPLANT | Facility: CLINIC | Age: 29
End: 2019-08-14

## 2019-08-14 VITALS
BODY MASS INDEX: 24.96 KG/M2 | OXYGEN SATURATION: 98 % | HEART RATE: 105 BPM | WEIGHT: 127.8 LBS | TEMPERATURE: 98.3 F | DIASTOLIC BLOOD PRESSURE: 91 MMHG | SYSTOLIC BLOOD PRESSURE: 148 MMHG | RESPIRATION RATE: 18 BRPM

## 2019-08-14 DIAGNOSIS — I15.1 HTN, KIDNEY TRANSPLANT RELATED: ICD-10-CM

## 2019-08-14 DIAGNOSIS — N25.81 SECONDARY RENAL HYPERPARATHYROIDISM (H): ICD-10-CM

## 2019-08-14 DIAGNOSIS — Z94.0 KIDNEY REPLACED BY TRANSPLANT: Primary | ICD-10-CM

## 2019-08-14 DIAGNOSIS — N18.6 ANEMIA IN CHRONIC KIDNEY DISEASE, ON CHRONIC DIALYSIS (H): ICD-10-CM

## 2019-08-14 DIAGNOSIS — D63.1 ANEMIA IN CHRONIC KIDNEY DISEASE, ON CHRONIC DIALYSIS (H): ICD-10-CM

## 2019-08-14 DIAGNOSIS — Z99.2 ANEMIA IN CHRONIC KIDNEY DISEASE, ON CHRONIC DIALYSIS (H): ICD-10-CM

## 2019-08-14 DIAGNOSIS — D84.9 IMMUNOSUPPRESSION (H): ICD-10-CM

## 2019-08-14 DIAGNOSIS — Z48.298 AFTERCARE FOLLOWING ORGAN TRANSPLANT: ICD-10-CM

## 2019-08-14 DIAGNOSIS — Z94.0 HTN, KIDNEY TRANSPLANT RELATED: ICD-10-CM

## 2019-08-14 DIAGNOSIS — T86.19 DELAYED GRAFT FUNCTION OF KIDNEY: ICD-10-CM

## 2019-08-14 DIAGNOSIS — E55.9 VITAMIN D DEFICIENCY: ICD-10-CM

## 2019-08-14 LAB
ANION GAP SERPL CALCULATED.3IONS-SCNC: 8 MMOL/L (ref 3–14)
BASOPHILS # BLD AUTO: 0 10E9/L (ref 0–0.2)
BASOPHILS NFR BLD AUTO: 0.2 %
BUN SERPL-MCNC: 82 MG/DL (ref 7–30)
CALCIUM SERPL-MCNC: 9.8 MG/DL (ref 8.5–10.1)
CHLORIDE SERPL-SCNC: 94 MMOL/L (ref 94–109)
CO2 SERPL-SCNC: 25 MMOL/L (ref 20–32)
CREAT SERPL-MCNC: 10.8 MG/DL (ref 0.52–1.04)
DIFFERENTIAL METHOD BLD: ABNORMAL
EOSINOPHIL # BLD AUTO: 0.1 10E9/L (ref 0–0.7)
EOSINOPHIL NFR BLD AUTO: 0.8 %
ERYTHROCYTE [DISTWIDTH] IN BLOOD BY AUTOMATED COUNT: 12.8 % (ref 10–15)
GFR SERPL CREATININE-BSD FRML MDRD: 4 ML/MIN/{1.73_M2}
GLUCOSE SERPL-MCNC: 118 MG/DL (ref 70–99)
HCT VFR BLD AUTO: 20.9 % (ref 35–47)
HGB BLD-MCNC: 7.3 G/DL (ref 11.7–15.7)
IMM GRANULOCYTES # BLD: 0.1 10E9/L (ref 0–0.4)
IMM GRANULOCYTES NFR BLD: 0.9 %
LYMPHOCYTES # BLD AUTO: 0.3 10E9/L (ref 0.8–5.3)
LYMPHOCYTES NFR BLD AUTO: 2.4 %
MAGNESIUM SERPL-MCNC: 2 MG/DL (ref 1.6–2.3)
MCH RBC QN AUTO: 32 PG (ref 26.5–33)
MCHC RBC AUTO-ENTMCNC: 34.9 G/DL (ref 31.5–36.5)
MCV RBC AUTO: 92 FL (ref 78–100)
MONOCYTES # BLD AUTO: 0.3 10E9/L (ref 0–1.3)
MONOCYTES NFR BLD AUTO: 3.1 %
NEUTROPHILS # BLD AUTO: 9.8 10E9/L (ref 1.6–8.3)
NEUTROPHILS NFR BLD AUTO: 92.6 %
NRBC # BLD AUTO: 0 10*3/UL
NRBC BLD AUTO-RTO: 0 /100
PHOSPHATE SERPL-MCNC: 6.7 MG/DL (ref 2.5–4.5)
PLATELET # BLD AUTO: 239 10E9/L (ref 150–450)
POTASSIUM SERPL-SCNC: 3.8 MMOL/L (ref 3.4–5.3)
RBC # BLD AUTO: 2.28 10E12/L (ref 3.8–5.2)
SODIUM SERPL-SCNC: 127 MMOL/L (ref 133–144)
TACROLIMUS BLD-MCNC: 6.5 UG/L (ref 5–15)
TME LAST DOSE: NORMAL H
WBC # BLD AUTO: 10.6 10E9/L (ref 4–11)

## 2019-08-14 PROCEDURE — 85025 COMPLETE CBC W/AUTO DIFF WBC: CPT | Performed by: INTERNAL MEDICINE

## 2019-08-14 PROCEDURE — G0463 HOSPITAL OUTPT CLINIC VISIT: HCPCS

## 2019-08-14 PROCEDURE — 80048 BASIC METABOLIC PNL TOTAL CA: CPT | Performed by: INTERNAL MEDICINE

## 2019-08-14 PROCEDURE — 83735 ASSAY OF MAGNESIUM: CPT | Performed by: INTERNAL MEDICINE

## 2019-08-14 PROCEDURE — 84100 ASSAY OF PHOSPHORUS: CPT | Performed by: INTERNAL MEDICINE

## 2019-08-14 PROCEDURE — 36415 COLL VENOUS BLD VENIPUNCTURE: CPT | Mod: ZF

## 2019-08-14 PROCEDURE — 80197 ASSAY OF TACROLIMUS: CPT | Performed by: INTERNAL MEDICINE

## 2019-08-14 NOTE — LETTER
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 Last HD on 8/9. U/S 8/8 without hydro and patent vasculature and Renogram 8/10 consistent with ATN. UOP continues to improve, symptomatically improved, and creatinine has stabilized, anticipate ongoing improving graft function. No RRT today, will plan to re evaluate tmrw. If without ongoing improvement, will need to consider Tx Biopsy (on ASA).   - Baseline Cr ~ TBD;              - Proteinuria: Not checked post transplant              - Date DSA Last Checked: 8/2/2019        DSA: No   - BK Viremia: Not checked post transplant   - Kidney Transplant Biopsy: No     # Immunosuppression: Tacrolimus immediate release (goal 8-10) and Mycophenolate mofetil (goal 1-3.5)              - Changes: Yes, will continue to adjust Tac pending levels.      # Prophylaxis:              - PJP: no bactrim due to allergy, no dapsone due to methemoglobinemia; plan for inhaled pentamidine today vs tmrw.              - CMV: Valcyte 450 q M-Th     # Hypertension: Borderline controlled; Goal BP: < 150/90              - Volume status:  Hypervolemic - but weight slightly down today, remains ~ 5kg above EDW of 53 kg              - Changes: No, Continue Norvasc 10mg and BID Bumex 4mg     # Anemia in Chronic Renal Disease: Hgb:  Decreased to 7.3         CHARLINE: No   -Iron Studies: Replete, will continue to trend Hgb, if down again tmrw - will re evaluate for ongoing hemolysis and consider CHARLINE     # Mineral Bone Disorder:   - Secondary renal hyperparathyroidism; PTH level is elevated at 358 pg/mL              - Vitamin D level; low, cont cholecal 2000 units daily.   - Calcium; level: normal              - Hyperphosphatemia: On sevelamer with meals, will continue with DGF    # Electrolytes   -Potassium; level: normal   -Magnesium; level: normal   -Bicarbonate; level: normal   -Sodium;  level: low due to renal dysfunction/impaired free H2) excretion. Will trend.     # Dysuria: suspect related to previous brooks catheter, UCx from  and  negative.    # Hypothyroidism: Last TSH 2.2. Continue on LevoT.    # Hyperprolactinemia: trending prolactins, no plans for surgical intervention per patient, plan for ongoing monitoring with endocrine    # Anxiety: Improving    #Hx of Sub Arachnoid Hemorrhage while on Coumadin: No acute issues     # Transplant History:  Etiology of kidney failure: IgA nephropathy  Tx: DDKT  Transplant: 8/3/2019 (Kidney)  Donor Type:  - Cardiac Death        Donor Class: Standard Criteria Donor  Crossmatch at time of Tx: negative  Significant changes in immunosuppression: None  Significant transplant-related complications: None    Transplant Office Phone Number: [phone]    Assessment and plan was discussed with the patient and she voiced her understanding and agreement.    Return visit: Return in about 1 day (around 8/15/2019).     Pt staffed with Dr. Sergio Horowitz MD   [id number]    Attestation:  This patient has been seen and evaluated by me, Nakul Hill MD.  I have reviewed the note and agree with plan of care as documented by the fellow.       Chief Complaint   Ms. Wagner is a 28 year old here for hospital follow up following kidney transplant. 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 Pain much improved with Oxy. She was able to sleep better, although was up frequently to urinate. She thinks her swelling is a little better as well. Appetite improved as well, ate 2 meals after going home.      Recent Hospitalizations:  [] No [x] Yes DDKT   New Medical Issues: [] No [x] Yes DDKT   Decreased energy: [] No [x]  Yes Improving   Chest pain or SOB with exertion:  [x] No [] Yes    Appetite change or weight change: [] No [x] Yes Appetite improving   Nausea, vomiting or diarrhea:  [x] No [] Yes    Fever, sweats or chills: [x] No [] Yes    Leg swelling: [] No [x] Yes Slightly improved     Home BP: Not checked    Review of Systems   A comprehensive review of systems was obtained and negative, except as noted in the HPI or PMH.    Problem List   Patient Active Problem List   Diagnosis     ESRD (end stage renal disease) on dialysis (H)     DVT of upper extremity (deep vein thrombosis) (H)     Seizure disorder (H)     HTN, kidney transplant related     Other general symptoms(780.99)     Galactorrhea     Acquired hypothyroidism     Anemia in chronic kidney disease     Increased prolactin level (H)     Normocytic anemia     Thrombocytopenia (H)     Infective endocarditis     Aftercare following organ transplant     Immunosuppression (H)     Delayed graft function of kidney     Methemoglobinemia     Kidney replaced by transplant     Anxiety     Secondary renal hyperparathyroidism (H)     Vitamin D deficiency       Social History   Social History     Tobacco Use     Smoking status: Never Smoker     Smokeless tobacco: Never Used   Substance Use Topics     Alcohol use: No     Alcohol/week: 0.0 oz     Drug use: No       Allergies   Allergies   Allergen Reactions     Chlorhexidine Rash     Rash at site     Sulfa Drugs Rash     Muscle stiffness of neck     Dapsone      Methemoglobinemia     Furosemide Other (See Comments)     Skin flushing     Lisinopril Swelling     angioedema     Alcohol Swabs [Isopropyl Alcohol] Rash       Medications   Current Outpatient Medications   Medication Sig     acetaminophen (TYLENOL) 325 MG tablet Take 2 tablets (650 mg) by mouth every 4 hours as needed for mild pain     amLODIPine (NORVASC) 10 MG tablet Take 1 tablet (10 mg) by mouth daily     aspirin (ASA) 81 MG chewable tablet Take 1 tablet (81 mg) by mouth  daily     atorvastatin (LIPITOR) 10 MG tablet Take 1 tablet (10 mg) by mouth daily     bumetanide (BUMEX) 2 MG tablet Take 2 tablets (4 mg) by mouth 2 times daily for 5 days     diphenhydrAMINE (BENADRYL) 25 MG tablet Take 1-2 tablets (25-50 mg) by mouth every 6 hours as needed for itching or allergies     docusate sodium (COLACE) 100 MG tablet Take 1 tablet (100 mg) by mouth daily as needed for constipation     EPINEPHrine (EPIPEN/ADRENACLICK/OR ANY BX GENERIC EQUIV) 0.3 MG/0.3ML injection 2-pack Inject 0.3 mLs (0.3 mg) into the muscle as needed for anaphylaxis     hydrOXYzine (ATARAX) 10 MG tablet Take 1 tablet (10 mg) by mouth 3 times daily as needed for anxiety     levothyroxine (SYNTHROID/LEVOTHROID) 25 MCG tablet Take 1 tablet (25 mcg) Tuesday, Thursday, Saturday and Sunday and 1.5 tablets (37.5 mcg) on Monday, Wednesday and Friday every week.     multivitamin RENAL (NEPHROCAPS/TRIPHROCAPS) 1 MG capsule Take 1 capsule by mouth daily     mycophenolate (GENERIC EQUIVALENT) 250 MG capsule Take 3 capsules (750 mg) by mouth 2 times daily     norethindrone (MICRONOR) 0.35 MG tablet Do not restart until your follow up appointment with your surgeon. Discuss restart date at that appointment. (Patient not taking: Reported on 8/13/2019)     ondansetron (ZOFRAN ODT) 4 MG ODT tab Take 1-2 tablets (4-8 mg) by mouth every 8 hours as needed for nausea     oxyCODONE (ROXICODONE) 5 MG tablet Take 0.5 tablets (2.5 mg) by mouth every 8 hours as needed for breakthrough pain     pentamidine (NEBUPENT) 300 MG neb solution Inhale 300 mg into the lungs every 28 days     polyethylene glycol (MIRALAX/GLYCOLAX) powder Take 17 g (1 capful) by mouth daily as needed for constipation     sevelamer (RENVELA) 800 MG tablet Take 1600 mg by mouth three times daily with meals AND Take 800 mg by mouth with snacks.     simethicone (MYLICON) 125 MG chewable tablet Take 1 tablet (125 mg) by mouth 4 times daily as needed for intestinal gas      tacrolimus (GENERIC EQUIVALENT) 0.5 MG capsule Take 1 cap by mouth twice daily as directed by Transplant Center for dose adjustment (Patient not taking: Reported on 8/13/2019)     tacrolimus (GENERIC EQUIVALENT) 1 MG capsule Take 5 capsules (5 mg) by mouth 2 times daily     valGANciclovir (VALCYTE) 450 MG tablet Take 1 tab every Monday and Thursday. Titrate dose up to a max of 2 tabs (900 mg) by mouth daily when directed by your transplant team.     vitamin D3 (CHOLECALCIFEROL) 2000 units (50 mcg) tablet Take 1 tablet (2,000 Units) by mouth daily     No current facility-administered medications for this visit.      There are no discontinued medications.    Physical Exam   Vital Signs: Reviewed    GENERAL APPEARANCE: alert and no distress  HENT: mouth without ulcers or lesions  LYMPHATICS: no cervical or supraclavicular nodes  RESP: lungs clear to auscultation - no wheezes  CV: regular rhythm, normal rate  EDEMA: trace LE edema bilaterally  ABDOMEN: soft, nondistended, nontender, bowel sounds normal  MS: extremities normal - no gross deformities noted  SKIN: no rash  TX KIDNEY: Well healed incision  DIALYSIS ACCESS: LUE AVF with good thrill    Data     Renal Latest Ref Rng & Units 8/14/2019 8/13/2019 8/12/2019   Na 133 - 144 mmol/L 127(L) 128(L) 129(L)   Na (external) 134 - 145 mEq/L - - -   K 3.4 - 5.3 mmol/L 3.8 3.8 4.2   K (external) 3.5 - 5.1 mEq/L - - -   Cl 94 - 109 mmol/L 94 96 97   Cl (external) 98 - 107 mEq/L - - -   CO2 20 - 32 mmol/L 25 23 23   CO2 (external) 21 - 31 mEq/L - - -   BUN 7 - 30 mg/dL 82(H) 72(H) 62(H)   BUN (external) mg/dL - - -   Cr 0.52 - 1.04 mg/dL 10.80(H) 10.70(H) 9.56(H)   Cr (external) 0.60 - 1.20 mg/dL - - -   Glucose 70 - 99 mg/dL 118(H) 128(H) 85   Ca  8.5 - 10.1 mg/dL 9.8 9.5 9.6   Ca (external) 8.6 - 10.2 mg/dL - - -   Mg 1.6 - 2.3 mg/dL 2.0 2.1 2.1     Bone Health Latest Ref Rng & Units 8/14/2019 8/13/2019 8/12/2019   Phos 2.5 - 4.5 mg/dL 6.7(H) 6.6(H) 6.1(H)   Phos (external)  2.5 - 6.0 mg/dL - - -   PTHi 18 - 80 pg/mL - - -   PTHi (external) 18 - 80 pg/mL - - -   Vit D Def 20 - 75 ug/L - - -     Heme Latest Ref Rng & Units 8/14/2019 8/13/2019 8/12/2019   WBC 4.0 - 11.0 10e9/L 10.6 9.3 10.1   WBC (external) 4.5 - 11.0 x10:3/uL - - -   Hgb 11.7 - 15.7 g/dL 7.3(L) 8.2(L) 6.7(LL)   Hgb (external) 12.0 - 16.0 g/dL - - -   Plt 150 - 450 10e9/L 239 198 168   Plt (external) 150 - 400 x10:3/uL - - -     Liver Latest Ref Rng & Units 8/2/2019 1/15/2019 1/6/2019   AP 40 - 150 U/L 65 - 63   AP (external) 34 - 104 U/L - - -   TBili 0.2 - 1.3 mg/dL 0.8 0.9 0.7   ALT 0 - 50 U/L 13 - 14   ALT (external) 7 - 52 U/L - - -   AST 0 - 45 U/L 4 - 8   AST (external) 13 - 39 U/L - - -   Tot Protein 6.8 - 8.8 g/dL 8.6 7.3 7.5   Tot Protein (external) 6.4 - 8.9 g/dL - - -   Albumin 3.4 - 5.0 g/dL 4.2 3.5 3.5   Albumin (external) - - - -     Pancreas Latest Ref Rng & Units 8/3/2019 8/2/2019   A1C 0 - 5.6 % 4.8 4.8     Iron studies Latest Ref Rng & Units 8/12/2019 7/9/2018 12/29/2016   Iron 35 - 180 ug/dL 96 - 137   Iron sat 15 - 46 % 59(H) - 60(H)   Ferritin 12 - 150 ng/mL - - 1,039(H)   Ferritin (external) 10 - 291 ng/mL - 1,557(H) -     [facility] Txp Virology 12/12/2013 12/11/2013   Hep B Surf 34.4 30.6        Recent Labs   Lab Test 08/09/19  0559 08/11/19  0606 08/13/19  0800   DOSTAC Not Provided Not Provided Not Provided   TACROL 3.8* 5.1 6.1             Nakul Hill MD

## 2019-08-14 NOTE — PROGRESS NOTES
"Mona Wagner came to [facility] today for a lab and assess following a kidney transplant on 8/3/19.      Discharge date: 8/12/19  Transplant coordinator: Laurence Vazquez RN Coordinator  Phone number patient can be reached at: [phone] cell    Physical Assessment:  See physical assessment located under \"Document Flowsheets\".  Incision site: dermabond, clean and dry, no redness  Lines: NA  Ramirez: NA  Urine clarity: yellow, going often, good output  Hydration: patient is instructed to drink only to thirst at this point  Nutrition: patient stated her appetite is a little better every day   Last BM: this morning, soft medium amount  Pain: patient taking oxycodone instead dialaudid, she reported that covered her pain well   Labs drawn by [facility] staff : vascular    Plan of care for today:   1.  Labs and assessment    Medication changes: none  Medications administered: none    Patient education:  The following teaching topics were addressed: Importance of drinking 2L of non-caffeinated fluids daily, Incisional care, Signs/symptoms of infection, Good handwashing, Medications (purposes, doses and times of administration) and Phone numbers to call with concenrs (Transplant coordinator, [facility] [address] and [facility])   Patient and and Jt verbalized understanding and all questions answered.    Drug level:  Prograf/tacrolimus 6.5 level today reviewed with Dr Hill who gave orders to have patient continue same dose of 6 mg every 12 hours.  Patient was updated with this information and verbalized understanding.    Face to face time: 20 minutes    Discharge Plan  Pt will follow up with [facility] on Thursday 8/15/19 at 7am for labs and assessment  Discharge instructions reviewed with patient: YES  Patient/Representative verbalized understanding, all questions answered: YES    Discharged from unit at 1030 with whom: Jt to home.    Cris Payan, RN     "

## 2019-08-14 NOTE — PROGRESS NOTES
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 Last HD on 8/9. U/S 8/8 without hydro and patent vasculature and Renogram 8/10 consistent with ATN. UOP continues to improve, symptomatically improved, and creatinine has stabilized, anticipate ongoing improving graft function. No RRT today, will plan to re evaluate tmrw. If without ongoing improvement, will need to consider Tx Biopsy (on ASA).   - Baseline Cr ~ TBD;              - Proteinuria: Not checked post transplant              - Date DSA Last Checked: 8/2/2019        DSA: No   - BK Viremia: Not checked post transplant   - Kidney Transplant Biopsy: No     # Immunosuppression: Tacrolimus immediate release (goal 8-10) and Mycophenolate mofetil (goal 1-3.5)              - Changes: Yes, will continue to adjust Tac pending levels.      # Prophylaxis:              - PJP: no bactrim due to allergy, no dapsone due to methemoglobinemia; plan for inhaled pentamidine today vs tmrw.              - CMV: Valcyte 450 q M-Th     # Hypertension: Borderline controlled; Goal BP: < 150/90              - Volume status:  Hypervolemic - but weight slightly down today, remains ~ 5kg above EDW of 53 kg              - Changes: No, Continue Norvasc 10mg and BID Bumex 4mg     # Anemia in Chronic Renal Disease: Hgb: Decreased to 7.3         CHARLINE: No   -Iron Studies: Replete, will continue to trend Hgb, if down again tmrw - will re evaluate for ongoing hemolysis and consider CHARLINE     # Mineral Bone Disorder:   - Secondary renal hyperparathyroidism; PTH level is elevated at 358 pg/mL              - Vitamin D level; low, cont cholecal 2000 units daily.   - Calcium; level: normal              - Hyperphosphatemia: On sevelamer with meals, will continue with DGF    # Electrolytes   -Potassium; level: normal   -Magnesium; level: normal   -Bicarbonate; level: normal   -Sodium; level: low due to renal dysfunction/impaired free H2) excretion. Will trend.     #  Dysuria: suspect related to previous brooks catheter, UCx from  and  negative.    # Hypothyroidism: Last TSH 2.2. Continue on LevoT.    # Hyperprolactinemia: trending prolactins, no plans for surgical intervention per patient, plan for ongoing monitoring with endocrine    # Anxiety: Improving    #Hx of Sub Arachnoid Hemorrhage while on Coumadin: No acute issues     # Transplant History:  Etiology of kidney failure: IgA nephropathy  Tx: DDKT  Transplant: 8/3/2019 (Kidney)  Donor Type:  - Cardiac Death        Donor Class: Standard Criteria Donor  Crossmatch at time of Tx: negative  Significant changes in immunosuppression: None  Significant transplant-related complications: None    Transplant Office Phone Number: [phone]    Assessment and plan was discussed with the patient and she voiced her understanding and agreement.    Return visit: Return in about 1 day (around 8/15/2019).     Pt staffed with Dr. Sergio Horowitz MD   [id number]    Attestation:  This patient has been seen and evaluated by me, Nakul Hill MD.  I have reviewed the note and agree with plan of care as documented by the fellow.       Chief Complaint   Ms. Wagner is a 28 year old here for hospital follow up following kidney transplant. 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 Pain much improved with Oxy. She was able to sleep better, although was up frequently to urinate. She thinks her swelling is a little better as well. Appetite improved as well, ate 2 meals after going home.      Recent Hospitalizations:  [] No [x] Yes DDKT   New Medical Issues: [] No [x] Yes DDKT   Decreased energy: [] No [x] Yes Improving   Chest pain or SOB with exertion:  [x] No [] Yes    Appetite change  or weight change: [] No [x] Yes Appetite improving   Nausea, vomiting or diarrhea:  [x] No [] Yes    Fever, sweats or chills: [x] No [] Yes    Leg swelling: [] No [x] Yes Slightly improved     Home BP: Not checked    Review of Systems   A comprehensive review of systems was obtained and negative, except as noted in the HPI or PMH.    Problem List   Patient Active Problem List   Diagnosis     ESRD (end stage renal disease) on dialysis (H)     DVT of upper extremity (deep vein thrombosis) (H)     Seizure disorder (H)     HTN, kidney transplant related     Other general symptoms(780.99)     Galactorrhea     Acquired hypothyroidism     Anemia in chronic kidney disease     Increased prolactin level (H)     Normocytic anemia     Thrombocytopenia (H)     Infective endocarditis     Aftercare following organ transplant     Immunosuppression (H)     Delayed graft function of kidney     Methemoglobinemia     Kidney replaced by transplant     Anxiety     Secondary renal hyperparathyroidism (H)     Vitamin D deficiency       Social History   Social History     Tobacco Use     Smoking status: Never Smoker     Smokeless tobacco: Never Used   Substance Use Topics     Alcohol use: No     Alcohol/week: 0.0 oz     Drug use: No       Allergies   Allergies   Allergen Reactions     Chlorhexidine Rash     Rash at site     Sulfa Drugs Rash     Muscle stiffness of neck     Dapsone      Methemoglobinemia     Furosemide Other (See Comments)     Skin flushing     Lisinopril Swelling     angioedema     Alcohol Swabs [Isopropyl Alcohol] Rash       Medications   Current Outpatient Medications   Medication Sig     acetaminophen (TYLENOL) 325 MG tablet Take 2 tablets (650 mg) by mouth every 4 hours as needed for mild pain     amLODIPine (NORVASC) 10 MG tablet Take 1 tablet (10 mg) by mouth daily     aspirin (ASA) 81 MG chewable tablet Take 1 tablet (81 mg) by mouth daily     atorvastatin (LIPITOR) 10 MG tablet Take 1 tablet (10 mg) by mouth daily      bumetanide (BUMEX) 2 MG tablet Take 2 tablets (4 mg) by mouth 2 times daily for 5 days     diphenhydrAMINE (BENADRYL) 25 MG tablet Take 1-2 tablets (25-50 mg) by mouth every 6 hours as needed for itching or allergies     docusate sodium (COLACE) 100 MG tablet Take 1 tablet (100 mg) by mouth daily as needed for constipation     EPINEPHrine (EPIPEN/ADRENACLICK/OR ANY BX GENERIC EQUIV) 0.3 MG/0.3ML injection 2-pack Inject 0.3 mLs (0.3 mg) into the muscle as needed for anaphylaxis     hydrOXYzine (ATARAX) 10 MG tablet Take 1 tablet (10 mg) by mouth 3 times daily as needed for anxiety     levothyroxine (SYNTHROID/LEVOTHROID) 25 MCG tablet Take 1 tablet (25 mcg) Tuesday, Thursday, Saturday and Sunday and 1.5 tablets (37.5 mcg) on Monday, Wednesday and Friday every week.     multivitamin RENAL (NEPHROCAPS/TRIPHROCAPS) 1 MG capsule Take 1 capsule by mouth daily     mycophenolate (GENERIC EQUIVALENT) 250 MG capsule Take 3 capsules (750 mg) by mouth 2 times daily     norethindrone (MICRONOR) 0.35 MG tablet Do not restart until your follow up appointment with your surgeon. Discuss restart date at that appointment. (Patient not taking: Reported on 8/13/2019)     ondansetron (ZOFRAN ODT) 4 MG ODT tab Take 1-2 tablets (4-8 mg) by mouth every 8 hours as needed for nausea     oxyCODONE (ROXICODONE) 5 MG tablet Take 0.5 tablets (2.5 mg) by mouth every 8 hours as needed for breakthrough pain     pentamidine (NEBUPENT) 300 MG neb solution Inhale 300 mg into the lungs every 28 days     polyethylene glycol (MIRALAX/GLYCOLAX) powder Take 17 g (1 capful) by mouth daily as needed for constipation     sevelamer (RENVELA) 800 MG tablet Take 1600 mg by mouth three times daily with meals AND Take 800 mg by mouth with snacks.     simethicone (MYLICON) 125 MG chewable tablet Take 1 tablet (125 mg) by mouth 4 times daily as needed for intestinal gas     tacrolimus (GENERIC EQUIVALENT) 0.5 MG capsule Take 1 cap by mouth twice daily as directed  by Transplant Center for dose adjustment (Patient not taking: Reported on 8/13/2019)     tacrolimus (GENERIC EQUIVALENT) 1 MG capsule Take 5 capsules (5 mg) by mouth 2 times daily     valGANciclovir (VALCYTE) 450 MG tablet Take 1 tab every Monday and Thursday. Titrate dose up to a max of 2 tabs (900 mg) by mouth daily when directed by your transplant team.     vitamin D3 (CHOLECALCIFEROL) 2000 units (50 mcg) tablet Take 1 tablet (2,000 Units) by mouth daily     No current facility-administered medications for this visit.      There are no discontinued medications.    Physical Exam   Vital Signs: Reviewed    GENERAL APPEARANCE: alert and no distress  HENT: mouth without ulcers or lesions  LYMPHATICS: no cervical or supraclavicular nodes  RESP: lungs clear to auscultation - no wheezes  CV: regular rhythm, normal rate  EDEMA: trace LE edema bilaterally  ABDOMEN: soft, nondistended, nontender, bowel sounds normal  MS: extremities normal - no gross deformities noted  SKIN: no rash  TX KIDNEY: Well healed incision  DIALYSIS ACCESS: LUE AVF with good thrill    Data     Renal Latest Ref Rng & Units 8/14/2019 8/13/2019 8/12/2019   Na 133 - 144 mmol/L 127(L) 128(L) 129(L)   Na (external) 134 - 145 mEq/L - - -   K 3.4 - 5.3 mmol/L 3.8 3.8 4.2   K (external) 3.5 - 5.1 mEq/L - - -   Cl 94 - 109 mmol/L 94 96 97   Cl (external) 98 - 107 mEq/L - - -   CO2 20 - 32 mmol/L 25 23 23   CO2 (external) 21 - 31 mEq/L - - -   BUN 7 - 30 mg/dL 82(H) 72(H) 62(H)   BUN (external) mg/dL - - -   Cr 0.52 - 1.04 mg/dL 10.80(H) 10.70(H) 9.56(H)   Cr (external) 0.60 - 1.20 mg/dL - - -   Glucose 70 - 99 mg/dL 118(H) 128(H) 85   Ca  8.5 - 10.1 mg/dL 9.8 9.5 9.6   Ca (external) 8.6 - 10.2 mg/dL - - -   Mg 1.6 - 2.3 mg/dL 2.0 2.1 2.1     Bone Health Latest Ref Rng & Units 8/14/2019 8/13/2019 8/12/2019   Phos 2.5 - 4.5 mg/dL 6.7(H) 6.6(H) 6.1(H)   Phos (external) 2.5 - 6.0 mg/dL - - -   PTHi 18 - 80 pg/mL - - -   PTHi (external) 18 - 80 pg/mL - - -   Vit  D Def 20 - 75 ug/L - - -     Heme Latest Ref Rng & Units 8/14/2019 8/13/2019 8/12/2019   WBC 4.0 - 11.0 10e9/L 10.6 9.3 10.1   WBC (external) 4.5 - 11.0 x10:3/uL - - -   Hgb 11.7 - 15.7 g/dL 7.3(L) 8.2(L) 6.7(LL)   Hgb (external) 12.0 - 16.0 g/dL - - -   Plt 150 - 450 10e9/L 239 198 168   Plt (external) 150 - 400 x10:3/uL - - -     Liver Latest Ref Rng & Units 8/2/2019 1/15/2019 1/6/2019   AP 40 - 150 U/L 65 - 63   AP (external) 34 - 104 U/L - - -   TBili 0.2 - 1.3 mg/dL 0.8 0.9 0.7   ALT 0 - 50 U/L 13 - 14   ALT (external) 7 - 52 U/L - - -   AST 0 - 45 U/L 4 - 8   AST (external) 13 - 39 U/L - - -   Tot Protein 6.8 - 8.8 g/dL 8.6 7.3 7.5   Tot Protein (external) 6.4 - 8.9 g/dL - - -   Albumin 3.4 - 5.0 g/dL 4.2 3.5 3.5   Albumin (external) - - - -     Pancreas Latest Ref Rng & Units 8/3/2019 8/2/2019   A1C 0 - 5.6 % 4.8 4.8     Iron studies Latest Ref Rng & Units 8/12/2019 7/9/2018 12/29/2016   Iron 35 - 180 ug/dL 96 - 137   Iron sat 15 - 46 % 59(H) - 60(H)   Ferritin 12 - 150 ng/mL - - 1,039(H)   Ferritin (external) 10 - 291 ng/mL - 1,557(H) -     [facility] Txp Virology 12/12/2013 12/11/2013   Hep B Surf 34.4 30.6        Recent Labs   Lab Test 08/09/19  0559 08/11/19  0606 08/13/19  0800   DOSTAC Not Provided Not Provided Not Provided   TACROL 3.8* 5.1 6.1

## 2019-08-14 NOTE — PHARMACY
Patient will use local pharmacy or other mail order for outpatient medications.  Updated pharmacy call list for initial visit.  Pt works for [employer]     YONG [email or web address].  [facility]  [phone] pager

## 2019-08-14 NOTE — PATIENT INSTRUCTIONS
Dear Mona Wagner    Thank you for choosing [facility] ([facility]) for your transplant cares.  The following information is a summary of our appointment as well as important reminders.      Please make sure your phone is available today because I will call to update you with your anti-rejection drug levels and possibly make changes to your anti-rejection dosages., Please refer to your hospital discharge instructions for details on home care services, future appointments, phone numbers, and diet/activity levels.    We look forward in seeing you on your next appointment here at [facility] ([facility]).  Please don t hesitate to call us at [phone] to reschedule any of your appointments or to speak with one of the [facility] registered nurses.  It was a pleasure taking care of you today.    Sincerely,    [facility]  [address] [place] [address]  [address]  Phone:  [phone]    1. Please return tomorrow at 7am for labs and assessment

## 2019-08-15 ENCOUNTER — INFUSION THERAPY VISIT (OUTPATIENT)
Dept: INFUSION THERAPY | Facility: CLINIC | Age: 29
End: 2019-08-15
Attending: INTERNAL MEDICINE
Payer: MEDICARE

## 2019-08-15 ENCOUNTER — HOSPITAL ENCOUNTER (OUTPATIENT)
Dept: DIALYSIS | Facility: CLINIC | Age: 29
Discharge: HOME | End: 2019-08-15
Attending: INTERNAL MEDICINE | Admitting: INTERNAL MEDICINE
Payer: MEDICARE

## 2019-08-15 ENCOUNTER — CARE COORDINATION (OUTPATIENT)
Dept: TRANSPLANT | Facility: CLINIC | Age: 29
End: 2019-08-15

## 2019-08-15 VITALS
SYSTOLIC BLOOD PRESSURE: 137 MMHG | DIASTOLIC BLOOD PRESSURE: 80 MMHG | BODY MASS INDEX: 24.33 KG/M2 | RESPIRATION RATE: 16 BRPM | HEART RATE: 101 BPM | WEIGHT: 124.56 LBS | TEMPERATURE: 98.2 F | OXYGEN SATURATION: 98 %

## 2019-08-15 VITALS
OXYGEN SATURATION: 100 % | WEIGHT: 125.7 LBS | RESPIRATION RATE: 18 BRPM | TEMPERATURE: 98.8 F | BODY MASS INDEX: 24.55 KG/M2

## 2019-08-15 DIAGNOSIS — Z94.0 HTN, KIDNEY TRANSPLANT RELATED: ICD-10-CM

## 2019-08-15 DIAGNOSIS — D84.9 IMMUNOSUPPRESSION (H): ICD-10-CM

## 2019-08-15 DIAGNOSIS — Z94.0 KIDNEY REPLACED BY TRANSPLANT: ICD-10-CM

## 2019-08-15 DIAGNOSIS — Z94.0 KIDNEY REPLACED BY TRANSPLANT: Primary | ICD-10-CM

## 2019-08-15 DIAGNOSIS — Z48.298 AFTERCARE FOLLOWING ORGAN TRANSPLANT: ICD-10-CM

## 2019-08-15 DIAGNOSIS — N17.0 ACUTE RENAL FAILURE WITH TUBULAR NECROSIS (H): Primary | ICD-10-CM

## 2019-08-15 DIAGNOSIS — E55.9 VITAMIN D DEFICIENCY: ICD-10-CM

## 2019-08-15 DIAGNOSIS — T86.19 DELAYED GRAFT FUNCTION OF KIDNEY: ICD-10-CM

## 2019-08-15 DIAGNOSIS — T86.19 DELAYED GRAFT FUNCTION OF KIDNEY: Primary | ICD-10-CM

## 2019-08-15 DIAGNOSIS — N18.6 ANEMIA IN CHRONIC KIDNEY DISEASE, ON CHRONIC DIALYSIS (H): ICD-10-CM

## 2019-08-15 DIAGNOSIS — I15.1 HTN, KIDNEY TRANSPLANT RELATED: ICD-10-CM

## 2019-08-15 DIAGNOSIS — N25.81 SECONDARY RENAL HYPERPARATHYROIDISM (H): ICD-10-CM

## 2019-08-15 DIAGNOSIS — Z99.2 ANEMIA IN CHRONIC KIDNEY DISEASE, ON CHRONIC DIALYSIS (H): ICD-10-CM

## 2019-08-15 DIAGNOSIS — I10 ESSENTIAL HYPERTENSION: ICD-10-CM

## 2019-08-15 DIAGNOSIS — D63.1 ANEMIA IN CHRONIC KIDNEY DISEASE, ON CHRONIC DIALYSIS (H): ICD-10-CM

## 2019-08-15 PROBLEM — N17.9 ACUTE KIDNEY FAILURE, UNSPECIFIED (H): Status: ACTIVE | Noted: 2019-08-15

## 2019-08-15 LAB
ANION GAP SERPL CALCULATED.3IONS-SCNC: 10 MMOL/L (ref 3–14)
BASOPHILS # BLD AUTO: 0 10E9/L (ref 0–0.2)
BASOPHILS NFR BLD AUTO: 0.2 %
BUN SERPL-MCNC: 85 MG/DL (ref 7–30)
CALCIUM SERPL-MCNC: 10.3 MG/DL (ref 8.5–10.1)
CHLORIDE SERPL-SCNC: 90 MMOL/L (ref 94–109)
CO2 SERPL-SCNC: 24 MMOL/L (ref 20–32)
CREAT SERPL-MCNC: 9.66 MG/DL (ref 0.52–1.04)
DIFFERENTIAL METHOD BLD: ABNORMAL
EOSINOPHIL # BLD AUTO: 0.1 10E9/L (ref 0–0.7)
EOSINOPHIL NFR BLD AUTO: 0.8 %
ERYTHROCYTE [DISTWIDTH] IN BLOOD BY AUTOMATED COUNT: 12.7 % (ref 10–15)
GFR SERPL CREATININE-BSD FRML MDRD: 5 ML/MIN/{1.73_M2}
GLUCOSE SERPL-MCNC: 137 MG/DL (ref 70–99)
HCT VFR BLD AUTO: 25.2 % (ref 35–47)
HGB BLD-MCNC: 8.7 G/DL (ref 11.7–15.7)
IMM GRANULOCYTES # BLD: 0.1 10E9/L (ref 0–0.4)
IMM GRANULOCYTES NFR BLD: 0.7 %
LYMPHOCYTES # BLD AUTO: 0.2 10E9/L (ref 0.8–5.3)
LYMPHOCYTES NFR BLD AUTO: 1.8 %
MAGNESIUM SERPL-MCNC: 2 MG/DL (ref 1.6–2.3)
MCH RBC QN AUTO: 31.4 PG (ref 26.5–33)
MCHC RBC AUTO-ENTMCNC: 34.5 G/DL (ref 31.5–36.5)
MCV RBC AUTO: 91 FL (ref 78–100)
MONOCYTES # BLD AUTO: 0.3 10E9/L (ref 0–1.3)
MONOCYTES NFR BLD AUTO: 2.4 %
NEUTROPHILS # BLD AUTO: 11.4 10E9/L (ref 1.6–8.3)
NEUTROPHILS NFR BLD AUTO: 94.1 %
NRBC # BLD AUTO: 0 10*3/UL
NRBC BLD AUTO-RTO: 0 /100
PHOSPHATE SERPL-MCNC: 6 MG/DL (ref 2.5–4.5)
PLATELET # BLD AUTO: 329 10E9/L (ref 150–450)
POTASSIUM SERPL-SCNC: 4 MMOL/L (ref 3.4–5.3)
RBC # BLD AUTO: 2.77 10E12/L (ref 3.8–5.2)
SODIUM SERPL-SCNC: 124 MMOL/L (ref 133–144)
TACROLIMUS BLD-MCNC: 8.1 UG/L (ref 5–15)
TME LAST DOSE: NORMAL H
WBC # BLD AUTO: 12.2 10E9/L (ref 4–11)

## 2019-08-15 PROCEDURE — 80197 ASSAY OF TACROLIMUS: CPT | Performed by: INTERNAL MEDICINE

## 2019-08-15 PROCEDURE — 25000128 H RX IP 250 OP 636: Performed by: INTERNAL MEDICINE

## 2019-08-15 PROCEDURE — 25000125 ZZHC RX 250: Mod: ZF | Performed by: NURSE PRACTITIONER

## 2019-08-15 PROCEDURE — 85025 COMPLETE CBC W/AUTO DIFF WBC: CPT | Performed by: INTERNAL MEDICINE

## 2019-08-15 PROCEDURE — G0463 HOSPITAL OUTPT CLINIC VISIT: HCPCS | Mod: 25

## 2019-08-15 PROCEDURE — 83735 ASSAY OF MAGNESIUM: CPT | Performed by: INTERNAL MEDICINE

## 2019-08-15 PROCEDURE — 84100 ASSAY OF PHOSPHORUS: CPT | Performed by: INTERNAL MEDICINE

## 2019-08-15 PROCEDURE — 94642 AEROSOL INHALATION TREATMENT: CPT

## 2019-08-15 PROCEDURE — 90935 HEMODIALYSIS ONE EVALUATION: CPT

## 2019-08-15 PROCEDURE — 80048 BASIC METABOLIC PNL TOTAL CA: CPT | Performed by: INTERNAL MEDICINE

## 2019-08-15 RX ORDER — ALBUTEROL SULFATE 0.83 MG/ML
2.5 SOLUTION RESPIRATORY (INHALATION) ONCE
Status: COMPLETED | OUTPATIENT
Start: 2019-08-15 | End: 2019-08-15

## 2019-08-15 RX ORDER — HEPARIN SODIUM 1000 [USP'U]/ML
500 INJECTION, SOLUTION INTRAVENOUS; SUBCUTANEOUS CONTINUOUS
Status: DISCONTINUED | OUTPATIENT
Start: 2019-08-15 | End: 2019-08-15

## 2019-08-15 RX ORDER — AMLODIPINE BESYLATE 10 MG/1
5 TABLET ORAL DAILY
Qty: 30 TABLET | Refills: 5 | COMMUNITY
Start: 2019-08-15 | End: 2019-09-03

## 2019-08-15 RX ORDER — PENTAMIDINE ISETHIONATE 300 MG/300MG
300 INHALANT RESPIRATORY (INHALATION) ONCE
Status: CANCELLED | OUTPATIENT
Start: 2019-09-12

## 2019-08-15 RX ORDER — PENTAMIDINE ISETHIONATE 300 MG/300MG
300 INHALANT RESPIRATORY (INHALATION) ONCE
Status: COMPLETED | OUTPATIENT
Start: 2019-08-15 | End: 2019-08-15

## 2019-08-15 RX ORDER — HEPARIN SODIUM 1000 [USP'U]/ML
500 INJECTION, SOLUTION INTRAVENOUS; SUBCUTANEOUS CONTINUOUS
Status: CANCELLED
Start: 2019-08-15

## 2019-08-15 RX ORDER — LIDOCAINE HYDROCHLORIDE 10 MG/ML
0.5 INJECTION, SOLUTION EPIDURAL; INFILTRATION; INTRACAUDAL; PERINEURAL
Status: CANCELLED
Start: 2019-08-15

## 2019-08-15 RX ORDER — LIDOCAINE HYDROCHLORIDE 10 MG/ML
0.5 INJECTION, SOLUTION EPIDURAL; INFILTRATION; INTRACAUDAL; PERINEURAL
Status: DISCONTINUED | OUTPATIENT
Start: 2019-08-15 | End: 2019-08-15

## 2019-08-15 RX ORDER — ALBUTEROL SULFATE 0.83 MG/ML
2.5 SOLUTION RESPIRATORY (INHALATION) ONCE
Status: CANCELLED | OUTPATIENT
Start: 2019-09-12

## 2019-08-15 RX ORDER — TACROLIMUS 1 MG/1
6 CAPSULE ORAL 2 TIMES DAILY
Qty: 300 CAPSULE | Refills: 11 | Status: SHIPPED | OUTPATIENT
Start: 2019-08-15 | End: 2019-08-16

## 2019-08-15 RX ADMIN — SODIUM CHLORIDE 300 ML: 9 INJECTION, SOLUTION INTRAVENOUS at 12:14

## 2019-08-15 RX ADMIN — PENTAMIDINE ISETHIONATE 300 MG: 300 INHALANT RESPIRATORY (INHALATION) at 09:09

## 2019-08-15 RX ADMIN — SODIUM CHLORIDE 250 ML: 9 INJECTION, SOLUTION INTRAVENOUS at 12:15

## 2019-08-15 RX ADMIN — HEPARIN SODIUM 500 UNITS/HR: 1000 INJECTION, SOLUTION INTRAVENOUS; SUBCUTANEOUS at 12:14

## 2019-08-15 RX ADMIN — ALBUTEROL SULFATE 2.5 MG: 2.5 SOLUTION RESPIRATORY (INHALATION) at 08:49

## 2019-08-15 RX ADMIN — HEPARIN SODIUM 500 UNITS: 1000 INJECTION, SOLUTION INTRAVENOUS; SUBCUTANEOUS at 12:14

## 2019-08-15 NOTE — PROGRESS NOTES
Kidney and Pancreas Transplant Coordinator Transition to Outpatient Discharge Note    Pt Name: Mona Wagner  : 1990    Transplant Date: 8/3/19  Hospital Discharge Date: 19  Initial date of Encounter: 19    Surgeon: Alex  Transplant Coordinator: Laurence Vazquez    Mona Wagner DDRT d/t IgA nephropathy, right side    Stent WAS placed.      CMV: D /R+ : Valcyte 3 months; renal dosing  EBV: D /R+    High Risk DSA: No.  PHS Increased Risk: No.    Immunosuppression:  BID  Tacro IR per protocol    Prophylaxis:   Dapsone / valcyte    Lines / drains in place at time of discharge:  NA       Pharmacy after discharge: [email or web address]  Lab after [facility]    Post-op course / additional monitoring:  DGF and situational anxiety    Patient Active Problem List   Diagnosis     ESRD (end stage renal disease) on dialysis (H)     DVT of upper extremity (deep vein thrombosis) (H)     Seizure disorder (H)     HTN, kidney transplant related     Other general symptoms(780.99)     Galactorrhea     Acquired hypothyroidism     Anemia in chronic kidney disease     Increased prolactin level (H)     Normocytic anemia     Thrombocytopenia (H)     Infective endocarditis     Aftercare following organ transplant     Immunosuppression (H)     Delayed graft function of kidney     Methemoglobinemia     Kidney replaced by transplant     Anxiety     Secondary renal hyperparathyroidism (H)     Vitamin D deficiency     Acute kidney failure, unspecified (H)       Education:  Writer met with Mona Wagner and Jt. We discussed immunosuppression medications, indications, side effects, dose adjustments, and lab monitoring. Lab draw schedule was given to pt and fully explained - Mailers not given; no questions. Further education was provided on typical post transplant course, labs examined with thresholds, biopsy, infection prevention, office contact numbers, and when to call.     Pt questions for follow up:  Patient an her  deny.    Discharge Transition Plan:   Pt will transition home, with assistance from Jt. Pt will have home care [facility].   Pt states having working BP monitor, scale, and thermometer at home.      Stent removal date: see Epic appointments.    Patient has viewed [facility], and has no additional questions at this time. RNCC encouraged on-going review.  Patient has voiced understanding of ability to utilize MyChart for self-review of lab values.    Special/Additional needs: No.    **Transport with [facility]    Laurence Vazquez  Post-Kidney Transplant Coordinator  (ph) [phone]

## 2019-08-15 NOTE — PROGRESS NOTES
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 U/S 8/8 without hydro and patent vasculature and Renogram 8/10 consistent with ATN. UOP continues to improve and creatinine has trended down nicely to 9.6 from 10.8. Anticipate ongoing improvement in graft function, however, given hyponatremia, will plan for iHD today.   - Baseline Cr ~ TBD;              - Proteinuria: Not checked post transplant              - Date DSA Last Checked: 8/2/2019        DSA: No   - BK Viremia: Not checked post transplant   - Kidney Transplant Biopsy: No     # Immunosuppression: Tacrolimus immediate release (goal 8-10) and Mycophenolate mofetil (goal 1-3.5)              - Changes: Will continue to adjust Tac pending levels.      # Prophylaxis:              - PJP: no bactrim due to allergy, no dapsone due to methemoglobinemia; plan for inhaled pentamidine today.              - CMV: Valcyte 450 q M-Th - will monitor eGFR and improvement in graft function to adjust dose as needed     # Hypertension: Controlled/now orthostatic; Goal BP: < 150/90              - Volume status:  Hypervolemic - but weight down 1 kg today, ~ 4kg above EDW of 53 kg              - Changes: Yes, reduce Norvasc to 5mg qhs and continue BID Bumex 4mg     # Anemia in Chronic Renal Disease: Hgb: Increased to 8.7         CHARLINE: No   -Iron Studies: Replete, will continue to trend Hgb     # Mineral Bone Disorder:   - Secondary renal hyperparathyroidism; PTH level is elevated at 358 pg/mL              - Vitamin D level; low, cont cholecal 2000 units daily.   - Calcium; level: Mildly elevated, will trend              - Hyperphosphatemia: On sevelamer with meals, will continue with DGF - anticipate she can stop these in the next 24-48 hrs    # Electrolytes   -Potassium; level: normal   -Magnesium; level: normal   -Bicarbonate; level: normal   -Sodium; level: low due to renal dysfunction/impaired free H2) excretion. Will plan for RRT today.      # Leukocytosis: WBC elevated, but all three cell lines up quite a bit - will trend. No fevers/chills, cough. Dysuria is improving, suspect related to previous brooks catheter, UCx from  and  negative.     # Hypothyroidism: Last TSH 2.2. Continue on LevoT.    # Hyperprolactinemia: trending prolactins, no plans for surgical intervention per patient, plan for ongoing monitoring with endocrine    # Anxiety: Improving    #Hx of Sub Arachnoid Hemorrhage while on Coumadin: No acute issues     # Transplant History:  Etiology of kidney failure: IgA nephropathy  Tx: DDKT  Transplant: 8/3/2019 (Kidney)  Donor Type:  - Cardiac Death        Donor Class: Standard Criteria Donor  Crossmatch at time of Tx: negative  Significant changes in immunosuppression: None  Significant transplant-related complications: None    Transplant Office Phone Number: [phone]    Assessment and plan was discussed with the patient and she voiced her understanding and agreement.    Return visit: Return in about 1 day (around 2019).     Pt staffed with Dr. Sergio Horowitz MD   [id number]    Attestation:  This patient has been seen and evaluated by me, Nakul Hill MD.  I have reviewed the note and agree with plan of care as documented by the fellow.       Chief Complaint   Ms. Wagner is a 28 year old here for hospital follow up following kidney transplant. 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 She has had increasing UOP, decreasing weight, improving appetite, and improving energy level. No hematuria, and some slight improvement in her dysuria.      Recent Hospitalizations:  [] No [x] Yes DDKT   New Medical Issues: [] No [x] Yes DDKT   Decreased energy: [] No [x] Yes  Improving   Chest pain or SOB with exertion:  [x] No [] Yes    Appetite change or weight change: [] No [x] Yes Appetite improving   Nausea, vomiting or diarrhea:  [x] No [] Yes    Fever, sweats or chills: [x] No [] Yes    Leg swelling: [x] No [] Yes Essentially resolved     Home BP: Not checked    Review of Systems   A comprehensive review of systems was obtained and negative, except as noted in the HPI or PMH.    Problem List   Patient Active Problem List   Diagnosis     DVT of upper extremity (deep vein thrombosis) (H)     Seizure disorder (H)     HTN, kidney transplant related     Other general symptoms(780.99)     Galactorrhea     Acquired hypothyroidism     Anemia in chronic kidney disease     Increased prolactin level (H)     Normocytic anemia     Thrombocytopenia (H)     Infective endocarditis     Aftercare following organ transplant     Immunosuppression (H)     Delayed graft function of kidney     Methemoglobinemia     Kidney replaced by transplant     Anxiety     Secondary renal hyperparathyroidism (H)     Vitamin D deficiency     Acute kidney failure, unspecified (H)       Social History   Social History     Tobacco Use     Smoking status: Never Smoker     Smokeless tobacco: Never Used   Substance Use Topics     Alcohol use: No     Alcohol/week: 0.0 oz     Drug use: No       Allergies   Allergies   Allergen Reactions     Chlorhexidine Rash     Rash at site     Sulfa Drugs Rash     Muscle stiffness of neck     Dapsone      Methemoglobinemia     Furosemide Other (See Comments)     Skin flushing     Lisinopril Swelling     angioedema     Alcohol Swabs [Isopropyl Alcohol] Rash       Medications   Current Outpatient Medications   Medication Sig     acetaminophen (TYLENOL) 325 MG tablet Take 2 tablets (650 mg) by mouth every 4 hours as needed for mild pain     amLODIPine (NORVASC) 10 MG tablet Take 1 tablet (10 mg) by mouth daily     aspirin (ASA) 81 MG chewable tablet Take 1 tablet (81 mg) by mouth daily      atorvastatin (LIPITOR) 10 MG tablet Take 1 tablet (10 mg) by mouth daily     bumetanide (BUMEX) 2 MG tablet Take 2 tablets (4 mg) by mouth 2 times daily for 5 days     diphenhydrAMINE (BENADRYL) 25 MG tablet Take 1-2 tablets (25-50 mg) by mouth every 6 hours as needed for itching or allergies     docusate sodium (COLACE) 100 MG tablet Take 1 tablet (100 mg) by mouth daily as needed for constipation     EPINEPHrine (EPIPEN/ADRENACLICK/OR ANY BX GENERIC EQUIV) 0.3 MG/0.3ML injection 2-pack Inject 0.3 mLs (0.3 mg) into the muscle as needed for anaphylaxis     hydrOXYzine (ATARAX) 10 MG tablet Take 1 tablet (10 mg) by mouth 3 times daily as needed for anxiety     levothyroxine (SYNTHROID/LEVOTHROID) 25 MCG tablet Take 1 tablet (25 mcg) Tuesday, Thursday, Saturday and Sunday and 1.5 tablets (37.5 mcg) on Monday, Wednesday and Friday every week.     multivitamin RENAL (NEPHROCAPS/TRIPHROCAPS) 1 MG capsule Take 1 capsule by mouth daily     mycophenolate (GENERIC EQUIVALENT) 250 MG capsule Take 3 capsules (750 mg) by mouth 2 times daily     norethindrone (MICRONOR) 0.35 MG tablet Do not restart until your follow up appointment with your surgeon. Discuss restart date at that appointment. (Patient not taking: Reported on 8/13/2019)     ondansetron (ZOFRAN ODT) 4 MG ODT tab Take 1-2 tablets (4-8 mg) by mouth every 8 hours as needed for nausea     oxyCODONE (ROXICODONE) 5 MG tablet Take 0.5 tablets (2.5 mg) by mouth every 8 hours as needed for breakthrough pain     pentamidine (NEBUPENT) 300 MG neb solution Inhale 300 mg into the lungs every 28 days     polyethylene glycol (MIRALAX/GLYCOLAX) powder Take 17 g (1 capful) by mouth daily as needed for constipation     sevelamer (RENVELA) 800 MG tablet Take 1600 mg by mouth three times daily with meals AND Take 800 mg by mouth with snacks.     simethicone (MYLICON) 125 MG chewable tablet Take 1 tablet (125 mg) by mouth 4 times daily as needed for intestinal gas     tacrolimus  (GENERIC EQUIVALENT) 0.5 MG capsule Take 1 cap by mouth twice daily as directed by Transplant Center for dose adjustment (Patient not taking: Reported on 8/13/2019)     tacrolimus (GENERIC EQUIVALENT) 1 MG capsule Take 5 capsules (5 mg) by mouth 2 times daily     valGANciclovir (VALCYTE) 450 MG tablet Take 1 tab every Monday and Thursday. Titrate dose up to a max of 2 tabs (900 mg) by mouth daily when directed by your transplant team.     vitamin D3 (CHOLECALCIFEROL) 2000 units (50 mcg) tablet Take 1 tablet (2,000 Units) by mouth daily     No current facility-administered medications for this visit.      Facility-Administered Medications Ordered in Other Visits   Medication     pentamidine (NEBUPENT) neb solution 300 mg     There are no discontinued medications.    Physical Exam   Vital Signs: Reviewed    GENERAL APPEARANCE: alert and no distress  HENT: mouth without ulcers or lesions  LYMPHATICS: no cervical nodes  RESP: lungs clear to auscultation - no wheezes  CV: regular rhythm, normal rate  EDEMA: trace LE edema bilaterally  ABDOMEN: soft, nondistended, nontender, bowel sounds normal  MS: extremities normal - no gross deformities noted  SKIN: no rash  TX KIDNEY: Well healed incision  DIALYSIS ACCESS: LUE AVF with good thrill    Data     Renal Latest Ref Rng & Units 8/15/2019 8/14/2019 8/13/2019   Na 133 - 144 mmol/L 124(L) 127(L) 128(L)   Na (external) 134 - 145 mEq/L - - -   K 3.4 - 5.3 mmol/L 4.0 3.8 3.8   K (external) 3.5 - 5.1 mEq/L - - -   Cl 94 - 109 mmol/L 90(L) 94 96   Cl (external) 98 - 107 mEq/L - - -   CO2 20 - 32 mmol/L 24 25 23   CO2 (external) 21 - 31 mEq/L - - -   BUN 7 - 30 mg/dL 85(H) 82(H) 72(H)   BUN (external) mg/dL - - -   Cr 0.52 - 1.04 mg/dL 9.66(H) 10.80(H) 10.70(H)   Cr (external) 0.60 - 1.20 mg/dL - - -   Glucose 70 - 99 mg/dL 137(H) 118(H) 128(H)   Ca  8.5 - 10.1 mg/dL 10.3(H) 9.8 9.5   Ca (external) 8.6 - 10.2 mg/dL - - -   Mg 1.6 - 2.3 mg/dL 2.0 2.0 2.1     Bone Health Latest Ref  Rng & Units 8/15/2019 8/14/2019 8/13/2019   Phos 2.5 - 4.5 mg/dL 6.0(H) 6.7(H) 6.6(H)   Phos (external) 2.5 - 6.0 mg/dL - - -   PTHi 18 - 80 pg/mL - - -   PTHi (external) 18 - 80 pg/mL - - -   Vit D Def 20 - 75 ug/L - - -     Heme Latest Ref Rng & Units 8/15/2019 8/14/2019 8/13/2019   WBC 4.0 - 11.0 10e9/L 12.2(H) 10.6 9.3   WBC (external) 4.5 - 11.0 x10:3/uL - - -   Hgb 11.7 - 15.7 g/dL 8.7(L) 7.3(L) 8.2(L)   Hgb (external) 12.0 - 16.0 g/dL - - -   Plt 150 - 450 10e9/L 329 239 198   Plt (external) 150 - 400 x10:3/uL - - -     Liver Latest Ref Rng & Units 8/2/2019 1/15/2019 1/6/2019   AP 40 - 150 U/L 65 - 63   AP (external) 34 - 104 U/L - - -   TBili 0.2 - 1.3 mg/dL 0.8 0.9 0.7   ALT 0 - 50 U/L 13 - 14   ALT (external) 7 - 52 U/L - - -   AST 0 - 45 U/L 4 - 8   AST (external) 13 - 39 U/L - - -   Tot Protein 6.8 - 8.8 g/dL 8.6 7.3 7.5   Tot Protein (external) 6.4 - 8.9 g/dL - - -   Albumin 3.4 - 5.0 g/dL 4.2 3.5 3.5   Albumin (external) - - - -     Pancreas Latest Ref Rng & Units 8/3/2019 8/2/2019   A1C 0 - 5.6 % 4.8 4.8     Iron studies Latest Ref Rng & Units 8/12/2019 7/9/2018 12/29/2016   Iron 35 - 180 ug/dL 96 - 137   Iron sat 15 - 46 % 59(H) - 60(H)   Ferritin 12 - 150 ng/mL - - 1,039(H)   Ferritin (external) 10 - 291 ng/mL - 1,557(H) -     [facility] Txp Virology 12/12/2013 12/11/2013   Hep B Surf 34.4 30.6        Recent Labs   Lab Test 08/11/19  0606 08/13/19  0800 08/14/19  0746   DOSTAC Not Provided Not Provided Not Provided   TACROL 5.1 6.1 6.5

## 2019-08-15 NOTE — PROGRESS NOTES
HEMODIALYSIS TREATMENT NOTE    Date: 8/15/2019  Time: 2:44 PM    Data:  Pre Wt: 56.9 kg (125 lb 7.1 oz)   Desired Wt: 56.9 kg   Post Wt: 56.5 kg (124 lb 9 oz)  Weight change: 0.4 kg  Ultrafiltration - Post Run Net Total Removed (mL): 0 mL  Vascular Access Status: patent  Dialyzer Rinse: Light  Total Blood Volume Processed: 52.2 L Liters  Total Dialysis (Treatment) Time: 2 Hours    Lab:   No    Interventions:  Consent obtained from MD Granados for outpatient dialysis.  Stable 2hr tx per nephrology for DGF post renal transplant.  BFR ordered 450.  15g buttonhole needles used on AVG with +bruit and +thrill.  No UF per order.  Pt urinated x2 using bedpan, unmeasured amount.  Wt change of 0.4kg indicated approx 400ml urine.  Pt brought to main lobby via w/c post HD.    Assessment:  Tolerated HD well, VSS throughout tx.      Plan:    Per renal team.

## 2019-08-15 NOTE — PATIENT INSTRUCTIONS
Patient Education     Albuterol inhalation aerosol  Brand Names: Proair HFA, Proventil, Proventil HFA, Respirol, Ventolin, Ventolin HFA  What is this medicine?  ALBUTEROL (al BYOO ter ole) is a bronchodilator. It helps open up the airways in your lungs to make it easier to breathe. This medicine is used to treat and to prevent bronchospasm.  How should I use this medicine?  This medicine is for inhalation through the mouth. Follow the directions on your prescription label. Take your medicine at regular intervals. Do not use more often than directed. Make sure that you are using your inhaler correctly. Ask you doctor or health care provider if you have any questions.  Talk to your pediatrician regarding the use of this medicine in children. Special care may be needed.  What side effects may I notice from receiving this medicine?  Side effects that you should report to your doctor or health care professional as soon as possible:    allergic reactions like skin rash, itching or hives, swelling of the face, lips, or tongue    breathing problems    chest pain    feeling faint or lightheaded, falls    high blood pressure    irregular heartbeat    fever    muscle cramps or weakness    pain, tingling, numbness in the hands or feet    vomiting  Side effects that usually do not require medical attention (report to your doctor or health care professional if they continue or are bothersome):    cough    difficulty sleeping    headache    nervousness or trembling    stomach upset    stuffy or runny nose    throat irritation    unusual taste  What may interact with this medicine?    anti-infectives like chloroquine and pentamidine    caffeine    cisapride    diuretics    medicines for colds    medicines for depression or for emotional or psychotic conditions    medicines for weight loss including some herbal products    methadone    some antibiotics like clarithromycin, erythromycin, levofloxacin, and linezolid    some heart  medicines    steroid hormones like dexamethasone, cortisone, hydrocortisone    theophylline    thyroid hormones    What if I miss a dose?  If you miss a dose, use it as soon as you can. If it is almost time for your next dose, use only that dose. Do not use double or extra doses.  Where should I keep my medicine?  Keep out of the reach of children.  Store at room temperature between 15 and 30 degrees C (59 and 86 degrees F). The contents are under pressure and may burst when exposed to heat or flame. Do not freeze. This medicine does not work as well if it is too cold. Throw away any unused medicine after the expiration date. Inhalers need to be thrown away after the labeled number of puffs have been used or by the expiration date; whichever comes first. Ventolin HFA should be thrown away 12 months after removing from foil pouch. Check the instructions that come with your medicine.  What should I tell my health care provider before I take this medicine?  They need to know if you have any of the following conditions:    diabetes    heart disease or irregular heartbeat    high blood pressure    pheochromocytoma    seizures    thyroid disease    an unusual or allergic reaction to albuterol, levalbuterol, sulfites, other medicines, foods, dyes, or preservatives    pregnant or trying to get pregnant    breast-feeding  What should I watch for while using this medicine?  Tell your doctor or health care professional if your symptoms do not improve. Do not use extra albuterol. If your asthma or bronchitis gets worse while you are using this medicine, call your doctor right away.  If your mouth gets dry try chewing sugarless gum or sucking hard candy. Drink water as directed.  NOTE:This sheet is a summary. It may not cover all possible information. If you have questions about this medicine, talk to your doctor, pharmacist, or health care provider. Copyright  2018 [employer]     Patient Education     Pentamidine  inhalation  Brand Name: NebuPent  What is this medicine?  PENTAMIDINE (pen LEW i glenn) is an anti-infective drug. It is used to prevent Pneumocystis carinii pneumonia (PCP).  How should I use this medicine?  This medicine is used in a nebulizer. Nebulizers make a liquid into an aerosol that you breathe in through your mouth or your mouth and nose into your lungs. It is usually given by a health care professional in a hospital or clinic setting. Take your medicine at regular intervals. Do not use more often than directed. Do not stop using except on the advice of your doctor or health care professional.  Talk to your pediatrician regarding the use of this medicine in children. Special care may be needed.  What side effects may I notice from receiving this medicine?  Side effects that you should report to your doctor or health care professional as soon as possible:    allergic reactions like skin rash, itching or hives, swelling of the face, lips, or tongue    breathing problems    chest pain    cough    fast, irregular heartbeat    feeling faint or lightheaded, falls    fever, chills    low blood pressure    redness, blistering, peeling or loosening of the skin, including inside the mouth    stomach pain, vomiting    trouble passing urine or change in the amount of urine    unusual bleeding or bruising    unusually weak or tired    white patches, sores in the mouth    yellowing of the eyes or skin  Side effects that usually do not require medical attention (report to your doctor or health care professional if they continue or are bothersome):    diarrhea    dry mouth    headache    metal taste    muscle pain    nausea    night sweats  What may interact with this medicine?  This medicine may interact with the following medications:    amphotericin B    certain antibiotics like gentamicin, tobramycin, vancomycin    cisplatin    foscarnet  What if I miss a dose?  It is important not to miss your dose. Call your doctor  or health care professional if you are unable to keep an appointment.  Where should I keep my medicine?  This drug is given in a hospital or clinic and will not be stored at home.  What should I tell my health care provider before I take this medicine?  They need to know if you have any of these conditions:    asthma    diabetes    heart problems    kidney disease    smoker    an unusual or allergic reaction to pentamidine, other medicines, foods, dyed, or preservatives    pregnant or trying to get pregnant    breast-feeding  What should I watch for while using this medicine?  See your doctor for regular check ups. Tell your doctor if you have any breathing problems, fever, or infection. You will need to have important blood work done while you are taking this medicine.  Do not mix this medicine with any other medicines in the nebulizer. Do not use the nebulizer for this medicine to inhale any other medicine.  This medicine can change your blood sugar levels. High blood sugar can cause more thirst and more urine passed, loss of appetite, fruity breath odor, and drowsiness. Low blood sugar can cause hunger, pale skin, headache, anxiety, chills or cold sweats, and shakiness. Call your doctor or health care professional if you think you have a problem with your blood sugar level.  NOTE:This sheet is a summary. It may not cover all possible information. If you have questions about this medicine, talk to your doctor, pharmacist, or health care provider. Copyright  2018 [employer]

## 2019-08-15 NOTE — PROGRESS NOTES
"Mona Wagner came to [facility] today for a lab and assess following a kidney transplant on 8/3/19.      Discharge date: 8/12/19  Transplant coordinator: Laurence Vazquez RN Coordinator  Phone number patient can be reached at: [phone] cell    Physical Assessment:  See physical assessment located under \"Document Flowsheets\".  Incision site: dermabond, clean and dry, no redness  Lines: NA  Ramirez: NA  Urine clarity: yellow, going often, good output  Hydration: patient is instructed to drink only to thirst at this point  Nutrition: patient stated her appetite is a little better every day   Last BM: this morning, small soft  Pain: patient taking oxycodone instead dialaudid, she reported that covered her pain well   Labs drawn by [facility] staff : vascular    Plan of care for today:   1.  Labs,  Assessment and education.    Medication changes: Decrease Norvasc to 5 mg        Medications administered:  Administrations This Visit     albuterol (PROVENTIL) neb solution 2.5 mg     Admin Date  08/15/2019 Action  Given Dose  2.5 mg Route  Nebulization Administered By  Jackie Parikh RN          pentamidine (NEBUPENT) neb solution 300 mg     Admin Date  08/15/2019 Action  Given Dose  300 mg Route  Inhalation Administered By  Jackie Parikh RN                Patient education:    The following teaching topics were addressed: Incisional care, Signs/symptoms of infection, Good handwashing, Medications (purposes, doses and times of administration), Phone numbers to call with concenrs (Transplant coordinator, [facility] [address] and [facility]), 7A discharge check list and Plan of care   Patient and  verbalized understanding and all questions answered.    Drug level:  Prograf level today reviewed with Dr ceja who gave orders to maintain the current dose.  Patient was updated with this information and verbalized understanding.    Face to face time: 45 minutes  Discharge Plan    Pt will follow up with today at noon for " dialysis and return tomorrow to [facility].   Discharge instructions reviewed with patient: YES  Patient/Representative verbalized understanding, all questions answered: YES        Jackie Parikh RN

## 2019-08-16 ENCOUNTER — ANCILLARY PROCEDURE (OUTPATIENT)
Dept: CARDIOLOGY | Facility: CLINIC | Age: 29
End: 2019-08-16
Attending: INTERNAL MEDICINE
Payer: MEDICARE

## 2019-08-16 ENCOUNTER — OFFICE VISIT (OUTPATIENT)
Dept: INFUSION THERAPY | Facility: CLINIC | Age: 29
End: 2019-08-16
Attending: INTERNAL MEDICINE
Payer: MEDICARE

## 2019-08-16 ENCOUNTER — ANCILLARY PROCEDURE (OUTPATIENT)
Dept: ULTRASOUND IMAGING | Facility: CLINIC | Age: 29
End: 2019-08-16
Attending: INTERNAL MEDICINE
Payer: MEDICARE

## 2019-08-16 VITALS
BODY MASS INDEX: 24.04 KG/M2 | RESPIRATION RATE: 16 BRPM | TEMPERATURE: 97.9 F | OXYGEN SATURATION: 100 % | WEIGHT: 123.1 LBS

## 2019-08-16 DIAGNOSIS — K62.89 RECTAL PAIN: ICD-10-CM

## 2019-08-16 DIAGNOSIS — R00.0 TACHYCARDIA: ICD-10-CM

## 2019-08-16 DIAGNOSIS — Z94.0 KIDNEY REPLACED BY TRANSPLANT: ICD-10-CM

## 2019-08-16 DIAGNOSIS — M79.89 LEG SWELLING: ICD-10-CM

## 2019-08-16 DIAGNOSIS — Z79.899 LONG TERM USE OF DRUG: ICD-10-CM

## 2019-08-16 DIAGNOSIS — R00.0 TACHYCARDIA: Primary | ICD-10-CM

## 2019-08-16 LAB
ANION GAP SERPL CALCULATED.3IONS-SCNC: 7 MMOL/L (ref 3–14)
BASOPHILS # BLD AUTO: 0 10E9/L (ref 0–0.2)
BASOPHILS NFR BLD AUTO: 0.4 %
BUN SERPL-MCNC: 51 MG/DL (ref 7–30)
CALCIUM SERPL-MCNC: 9.9 MG/DL (ref 8.5–10.1)
CHLORIDE SERPL-SCNC: 98 MMOL/L (ref 94–109)
CO2 SERPL-SCNC: 27 MMOL/L (ref 20–32)
CREAT SERPL-MCNC: 5.76 MG/DL (ref 0.52–1.04)
DIFFERENTIAL METHOD BLD: ABNORMAL
EOSINOPHIL # BLD AUTO: 0.1 10E9/L (ref 0–0.7)
EOSINOPHIL NFR BLD AUTO: 0.7 %
ERYTHROCYTE [DISTWIDTH] IN BLOOD BY AUTOMATED COUNT: 12.9 % (ref 10–15)
GFR SERPL CREATININE-BSD FRML MDRD: 9 ML/MIN/{1.73_M2}
GLUCOSE SERPL-MCNC: 100 MG/DL (ref 70–99)
HCT VFR BLD AUTO: 22.4 % (ref 35–47)
HGB BLD-MCNC: 7.6 G/DL (ref 11.7–15.7)
IMM GRANULOCYTES # BLD: 0.1 10E9/L (ref 0–0.4)
IMM GRANULOCYTES NFR BLD: 0.8 %
LYMPHOCYTES # BLD AUTO: 0.2 10E9/L (ref 0.8–5.3)
LYMPHOCYTES NFR BLD AUTO: 1.9 %
MAGNESIUM SERPL-MCNC: 1.8 MG/DL (ref 1.6–2.3)
MCH RBC QN AUTO: 31.7 PG (ref 26.5–33)
MCHC RBC AUTO-ENTMCNC: 33.9 G/DL (ref 31.5–36.5)
MCV RBC AUTO: 93 FL (ref 78–100)
MONOCYTES # BLD AUTO: 0.4 10E9/L (ref 0–1.3)
MONOCYTES NFR BLD AUTO: 3.7 %
NEUTROPHILS # BLD AUTO: 10.5 10E9/L (ref 1.6–8.3)
NEUTROPHILS NFR BLD AUTO: 92.5 %
NRBC # BLD AUTO: 0 10*3/UL
NRBC BLD AUTO-RTO: 0 /100
PHOSPHATE SERPL-MCNC: 4.8 MG/DL (ref 2.5–4.5)
PLATELET # BLD AUTO: 292 10E9/L (ref 150–450)
POTASSIUM SERPL-SCNC: 3.8 MMOL/L (ref 3.4–5.3)
RBC # BLD AUTO: 2.4 10E12/L (ref 3.8–5.2)
SODIUM SERPL-SCNC: 133 MMOL/L (ref 133–144)
TACROLIMUS BLD-MCNC: 6.1 UG/L (ref 5–15)
TME LAST DOSE: NORMAL H
WBC # BLD AUTO: 11.4 10E9/L (ref 4–11)

## 2019-08-16 PROCEDURE — 36415 COLL VENOUS BLD VENIPUNCTURE: CPT

## 2019-08-16 PROCEDURE — 83735 ASSAY OF MAGNESIUM: CPT | Performed by: INTERNAL MEDICINE

## 2019-08-16 PROCEDURE — G0463 HOSPITAL OUTPT CLINIC VISIT: HCPCS

## 2019-08-16 PROCEDURE — 80180 DRUG SCRN QUAN MYCOPHENOLATE: CPT | Performed by: INTERNAL MEDICINE

## 2019-08-16 PROCEDURE — 80048 BASIC METABOLIC PNL TOTAL CA: CPT | Performed by: INTERNAL MEDICINE

## 2019-08-16 PROCEDURE — 40000556 ZZH STATISTIC PERIPHERAL IV START W US GUIDANCE: Mod: ZF

## 2019-08-16 PROCEDURE — 85025 COMPLETE CBC W/AUTO DIFF WBC: CPT | Performed by: INTERNAL MEDICINE

## 2019-08-16 PROCEDURE — 84100 ASSAY OF PHOSPHORUS: CPT | Performed by: INTERNAL MEDICINE

## 2019-08-16 PROCEDURE — 80197 ASSAY OF TACROLIMUS: CPT | Performed by: INTERNAL MEDICINE

## 2019-08-16 RX ORDER — TACROLIMUS 1 MG/1
7 CAPSULE ORAL 2 TIMES DAILY
Qty: 300 CAPSULE | Refills: 11 | COMMUNITY
Start: 2019-08-16 | End: 2019-08-21

## 2019-08-16 NOTE — PROGRESS NOTES
"Mona Wagner came to [facility] today for a lab and assess following a kidney transplant on 8/3/19.      Discharge date: 8/12/19  Transplant coordinator: Laurence Vazquez RN Coordinator  Phone number patient can be reached at: [phone] cell    Physical Assessment:  See physical assessment located under \"Document Flowsheets\".  Incision site: dermabond, clean and dry, no redness  Lines: NA  Ramirez: NA  Urine clarity: yellow, going often, good output  Hydration: patient is instructed to drink only to thirst at this point  Nutrition: patient stated her appetite is a little better every day   Last BM: this morning, small soft  Pain: patient taking oxycodone instead dialaudid, she reported that covered her pain well   Labs drawn by [facility] staff : vascular    Plan of care for today:   1.  Labs,  Assessment and education.  Patient education:    The following teaching topics were addressed: Incisional care, Signs/symptoms of infection, Good handwashing, Medications (purposes, doses and times of administration), Phone numbers to call with concenrs (Transplant coordinator, [facility]- and [facility]) and Plan of care   Patient verbalized understanding and all questions answered.    Drug level:  Prograf level today reviewed with Dr Wilson who contacted the patient with results and had the patient increase the Prograf to 7 mg every 12 hours.  Face to face time: 30  Discharge Plan    Pt will follow up with follow up with [facility] on Sunday.  Discharge instructions reviewed with patient: YES  Patient/Representative verbalized understanding, all questions answered: YES    Jackie Parikh RN       "

## 2019-08-16 NOTE — PROGRESS NOTES
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 U/S 8/8 without hydro and patent vasculature and Renogram 8/10 consistent with ATN. UOP continues to improve. Underwent iHD for 2 hour run yesterday due to hyponatremia. Cr 5.76 today. Anticipate ongoing improvement in graft function.   - Baseline Cr ~ TBD;              - Proteinuria: Not checked post transplant              - Date DSA Last Checked: 8/2/2019        DSA: No   - BK Viremia: Not checked post transplant   - Kidney Transplant Biopsy: No     # Immunosuppression: Tacrolimus immediate release (goal 8-10) and Mycophenolate mofetil (goal 1-3.5)              - Changes: Will continue to adjust Tac pending levels.      # Prophylaxis:              - PJP: no bactrim due to allergy, no dapsone due to methemoglobinemia; received inhaled pentamidine 8/12              - CMV: Valcyte 450 q M-Th - will monitor eGFR and improvement in graft function to adjust dose as needed     # Hypertension: Controlled; Goal BP: < 150/90              - Volume status: Hypervolemic - but weight coming down, ~ 3kg above EDW of 53 kg              - Changes: No, on Norvasc 5mg qhs and continue BID Bumex 4mg     # Anemia in Chronic Renal Disease: Hgb: Decreased to 7.6.     CHARLINE: No   -Iron Studies: Replete, will continue to trend Hgb     # Mineral Bone Disorder:   - Secondary renal hyperparathyroidism; PTH level is elevated at 358 pg/mL              - Vitamin D level; low, cont cholecal 2000 units daily.   - Calcium; level: Mildly elevated, will trend              - Hyperphosphatemia: On sevelamer with meals, will continue with DGF - anticipate she can stop these in the next 24-48 hrs    # Electrolytes   -Potassium; level: normal   -Magnesium; level: normal   -Bicarbonate; level: normal   -Sodium; level: low due to renal dysfunction/impaired free H2) excretion.    # Leukocytosis: WBC elevated but stable, but all three cell lines up quite a bit - will  "trend. No fevers/chills, cough. Dysuria is improving, suspect related to previous brooks catheter, UCx from  and  negative.     # Hypothyroidism: Last TSH 2.2. Continue on LevoT.    # Hyperprolactinemia: trending prolactins, no plans for surgical intervention per patient, plan for ongoing monitoring with endocrine    # Anxiety: Improving    # Tachycardia: Ongoing, no chest pain. Will get Echo to further evaluate.   - Echo today    # Lower Extremity Swelling: L leg slightly larger than R. Has not resumed birth control pills.   - Bilateral Lower Extremity Venous Dopplers today    # Rectal pain: Hx sounds consistent with hemorrhoids. No fevers, chills. No blood in the stool.   - Trial of Preparation H   - If can be scheduled in [facility] today, will get appointment; if not, will monitor closely    #Hx of Sub Arachnoid Hemorrhage while on Coumadin: No acute issues     # Transplant History:  Etiology of kidney failure: IgA nephropathy  Tx: DDKT  Transplant: 8/3/2019 (Kidney)  Donor Type:  - Cardiac Death        Donor Class: Standard Criteria Donor  Crossmatch at time of Tx: negative  Significant changes in immunosuppression: None  Significant transplant-related complications: None    Transplant Office Phone Number: [phone]    Assessment and plan was discussed with the patient and she voiced her understanding and agreement.    Return visit: Return in about 2 days (around 2019).     Pt staffed with Dr. Steve Enciso MD  Internal Medicine, PGY-3      Chief Complaint   Ms. Wagner is a 28 year old here for hospital follow up following kidney transplant. 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 No fluid removed. She reports she felt \"tingly\" after, " "but that sensation resolved by this morning. She also reports having sharp pain with bowel movements and an achey feeling in her rectum with sitting. No blood in the stool. Has never had hemorrhoids before. Maybe feels something \"hanging down\" from her anus, but unsure. Continues to have increasing UOP, decreasing weight and appetite is improving.      Recent Hospitalizations:  [] No [x] Yes DDKT   New Medical Issues: [] No [x] Yes DDKT   Decreased energy: [] No [x] Yes Improving   Chest pain or SOB with exertion:  [x] No [] Yes    Appetite change or weight change: [] No [x] Yes Appetite improving   Nausea, vomiting or diarrhea:  [x] No [] Yes    Fever, sweats or chills: [x] No [] Yes    Leg swelling: [] No [x] Yes Slight but improving, L leg > R today     Home BP: Not checked    Review of Systems   A comprehensive review of systems was obtained and negative, except as noted in the HPI or PMH.    Problem List   Patient Active Problem List   Diagnosis     DVT of upper extremity (deep vein thrombosis) (H)     Seizure disorder (H)     HTN, kidney transplant related     Other general symptoms(780.99)     Galactorrhea     Acquired hypothyroidism     Anemia in chronic kidney disease     Increased prolactin level (H)     Normocytic anemia     Thrombocytopenia (H)     Infective endocarditis     Aftercare following organ transplant     Immunosuppression (H)     Delayed graft function of kidney     Methemoglobinemia     Kidney replaced by transplant     Anxiety     Secondary renal hyperparathyroidism (H)     Vitamin D deficiency     Acute kidney failure, unspecified (H)       Social History   Social History     Tobacco Use     Smoking status: Never Smoker     Smokeless tobacco: Never Used   Substance Use Topics     Alcohol use: No     Alcohol/week: 0.0 oz     Drug use: No       Allergies   Allergies   Allergen Reactions     Chlorhexidine Rash     Rash at site     Sulfa Drugs Rash     Muscle stiffness of neck     Dapsone      " Methemoglobinemia     Furosemide Other (See Comments)     Skin flushing     Lisinopril Swelling     angioedema     Alcohol Swabs [Isopropyl Alcohol] Rash       Medications   Current Outpatient Medications   Medication Sig     phenylephrine-shark liver oil-mineral oil-petrolatum (PREPARATION H) 0.25-3-14-71.9 % rectal ointment Place rectally 2 times daily as needed for hemorrhoids     acetaminophen (TYLENOL) 325 MG tablet Take 2 tablets (650 mg) by mouth every 4 hours as needed for mild pain     amLODIPine (NORVASC) 10 MG tablet Take 0.5 tablets (5 mg) by mouth daily     aspirin (ASA) 81 MG chewable tablet Take 1 tablet (81 mg) by mouth daily     atorvastatin (LIPITOR) 10 MG tablet Take 1 tablet (10 mg) by mouth daily     bumetanide (BUMEX) 2 MG tablet Take 2 tablets (4 mg) by mouth 2 times daily for 5 days     diphenhydrAMINE (BENADRYL) 25 MG tablet Take 1-2 tablets (25-50 mg) by mouth every 6 hours as needed for itching or allergies     docusate sodium (COLACE) 100 MG tablet Take 1 tablet (100 mg) by mouth daily as needed for constipation     EPINEPHrine (EPIPEN/ADRENACLICK/OR ANY BX GENERIC EQUIV) 0.3 MG/0.3ML injection 2-pack Inject 0.3 mLs (0.3 mg) into the muscle as needed for anaphylaxis     hydrOXYzine (ATARAX) 10 MG tablet Take 1 tablet (10 mg) by mouth 3 times daily as needed for anxiety     levothyroxine (SYNTHROID/LEVOTHROID) 25 MCG tablet Take 1 tablet (25 mcg) Tuesday, Thursday, Saturday and Sunday and 1.5 tablets (37.5 mcg) on Monday, Wednesday and Friday every week.     multivitamin RENAL (NEPHROCAPS/TRIPHROCAPS) 1 MG capsule Take 1 capsule by mouth daily     mycophenolate (GENERIC EQUIVALENT) 250 MG capsule Take 3 capsules (750 mg) by mouth 2 times daily     norethindrone (MICRONOR) 0.35 MG tablet Do not restart until your follow up appointment with your surgeon. Discuss restart date at that appointment.     ondansetron (ZOFRAN ODT) 4 MG ODT tab Take 1-2 tablets (4-8 mg) by mouth every 8 hours as  needed for nausea     oxyCODONE (ROXICODONE) 5 MG tablet Take 0.5 tablets (2.5 mg) by mouth every 8 hours as needed for breakthrough pain     pentamidine (NEBUPENT) 300 MG neb solution Inhale 300 mg into the lungs every 28 days     polyethylene glycol (MIRALAX/GLYCOLAX) powder Take 17 g (1 capful) by mouth daily as needed for constipation     sevelamer (RENVELA) 800 MG tablet Take 1600 mg by mouth three times daily with meals AND Take 800 mg by mouth with snacks.     simethicone (MYLICON) 125 MG chewable tablet Take 1 tablet (125 mg) by mouth 4 times daily as needed for intestinal gas     tacrolimus (GENERIC EQUIVALENT) 0.5 MG capsule Take 1 cap by mouth twice daily as directed by Transplant Center for dose adjustment     tacrolimus (GENERIC EQUIVALENT) 1 MG capsule Take 6 capsules (6 mg) by mouth 2 times daily     valGANciclovir (VALCYTE) 450 MG tablet Take 1 tab every Monday and Thursday. Titrate dose up to a max of 2 tabs (900 mg) by mouth daily when directed by your transplant team.     vitamin D3 (CHOLECALCIFEROL) 2000 units (50 mcg) tablet Take 1 tablet (2,000 Units) by mouth daily     No current facility-administered medications for this visit.      There are no discontinued medications.    Physical Exam   Vital Signs: Reviewed    GENERAL APPEARANCE: alert and no distress  HENT: mouth without ulcers or lesions  LYMPHATICS: no cervical nodes  RESP: lungs clear to auscultation - no wheezes  CV: regular rhythm, tachycardic, III/VI systolic murmur  EDEMA: trace LE edema bilaterally, L >R  ABDOMEN: soft, nondistended, mild appropriate post-op tenderness, bowel sounds normal  MS: extremities normal - no gross deformities noted  SKIN: no rash  TX KIDNEY: Well healed incision  DIALYSIS ACCESS: LUE AVF with good thrill    Data     Renal Latest Ref Rng & Units 8/16/2019 8/15/2019 8/14/2019   Na 133 - 144 mmol/L 133 124(L) 127(L)   Na (external) 134 - 145 mEq/L - - -   K 3.4 - 5.3 mmol/L 3.8 4.0 3.8   K (external) 3.5 -  5.1 mEq/L - - -   Cl 94 - 109 mmol/L 98 90(L) 94   Cl (external) 98 - 107 mEq/L - - -   CO2 20 - 32 mmol/L 27 24 25   CO2 (external) 21 - 31 mEq/L - - -   BUN 7 - 30 mg/dL 51(H) 85(H) 82(H)   BUN (external) mg/dL - - -   Cr 0.52 - 1.04 mg/dL 5.76(H) 9.66(H) 10.80(H)   Cr (external) 0.60 - 1.20 mg/dL - - -   Glucose 70 - 99 mg/dL 100(H) 137(H) 118(H)   Ca  8.5 - 10.1 mg/dL 9.9 10.3(H) 9.8   Ca (external) 8.6 - 10.2 mg/dL - - -   Mg 1.6 - 2.3 mg/dL 1.8 2.0 2.0     Bone Health Latest Ref Rng & Units 8/16/2019 8/15/2019 8/14/2019   Phos 2.5 - 4.5 mg/dL 4.8(H) 6.0(H) 6.7(H)   Phos (external) 2.5 - 6.0 mg/dL - - -   PTHi 18 - 80 pg/mL - - -   PTHi (external) 18 - 80 pg/mL - - -   Vit D Def 20 - 75 ug/L - - -     Heme Latest Ref Rng & Units 8/16/2019 8/15/2019 8/14/2019   WBC 4.0 - 11.0 10e9/L 11.4(H) 12.2(H) 10.6   WBC (external) 4.5 - 11.0 x10:3/uL - - -   Hgb 11.7 - 15.7 g/dL 7.6(L) 8.7(L) 7.3(L)   Hgb (external) 12.0 - 16.0 g/dL - - -   Plt 150 - 450 10e9/L 292 329 239   Plt (external) 150 - 400 x10:3/uL - - -     Liver Latest Ref Rng & Units 8/2/2019 1/15/2019 1/6/2019   AP 40 - 150 U/L 65 - 63   AP (external) 34 - 104 U/L - - -   TBili 0.2 - 1.3 mg/dL 0.8 0.9 0.7   ALT 0 - 50 U/L 13 - 14   ALT (external) 7 - 52 U/L - - -   AST 0 - 45 U/L 4 - 8   AST (external) 13 - 39 U/L - - -   Tot Protein 6.8 - 8.8 g/dL 8.6 7.3 7.5   Tot Protein (external) 6.4 - 8.9 g/dL - - -   Albumin 3.4 - 5.0 g/dL 4.2 3.5 3.5   Albumin (external) - - - -     Pancreas Latest Ref Rng & Units 8/3/2019 8/2/2019   A1C 0 - 5.6 % 4.8 4.8     Iron studies Latest Ref Rng & Units 8/12/2019 7/9/2018 12/29/2016   Iron 35 - 180 ug/dL 96 - 137   Iron sat 15 - 46 % 59(H) - 60(H)   Ferritin 12 - 150 ng/mL - - 1,039(H)   Ferritin (external) 10 - 291 ng/mL - 1,557(H) -     [facility] Virology 12/12/2013 12/11/2013   Hep B Surf 34.4 30.6        Recent Labs   Lab Test 08/13/19  0800 08/14/19  0746 08/15/19  0725   DOSTAC Not Provided Not Provided Not Provided    TACROL 6.1 6.5 8.1     Patient was seen and evaluated by me, Dean Wilson MD. I have reviewed the note and agree with the the plan of care as documented by the fellow.

## 2019-08-18 ENCOUNTER — INFUSION THERAPY VISIT (OUTPATIENT)
Dept: INFUSION THERAPY | Facility: CLINIC | Age: 29
End: 2019-08-18
Attending: INTERNAL MEDICINE
Payer: MEDICARE

## 2019-08-18 VITALS — WEIGHT: 119.7 LBS | BODY MASS INDEX: 23.38 KG/M2 | TEMPERATURE: 98.1 F | RESPIRATION RATE: 16 BRPM

## 2019-08-18 DIAGNOSIS — M54.9 BACK PAIN, UNSPECIFIED BACK LOCATION, UNSPECIFIED BACK PAIN LATERALITY, UNSPECIFIED CHRONICITY: ICD-10-CM

## 2019-08-18 DIAGNOSIS — Z94.0 KIDNEY REPLACED BY TRANSPLANT: Primary | ICD-10-CM

## 2019-08-18 LAB
ANION GAP SERPL CALCULATED.3IONS-SCNC: 7 MMOL/L (ref 3–14)
BASOPHILS # BLD AUTO: 0.1 10E9/L (ref 0–0.2)
BASOPHILS NFR BLD AUTO: 0.6 %
BUN SERPL-MCNC: 56 MG/DL (ref 7–30)
CALCIUM SERPL-MCNC: 10.5 MG/DL (ref 8.5–10.1)
CHLORIDE SERPL-SCNC: 106 MMOL/L (ref 94–109)
CO2 SERPL-SCNC: 25 MMOL/L (ref 20–32)
CREAT SERPL-MCNC: 4.31 MG/DL (ref 0.52–1.04)
DIFFERENTIAL METHOD BLD: ABNORMAL
EOSINOPHIL # BLD AUTO: 0.1 10E9/L (ref 0–0.7)
EOSINOPHIL NFR BLD AUTO: 1.3 %
ERYTHROCYTE [DISTWIDTH] IN BLOOD BY AUTOMATED COUNT: 13.1 % (ref 10–15)
GFR SERPL CREATININE-BSD FRML MDRD: 13 ML/MIN/{1.73_M2}
GLUCOSE SERPL-MCNC: 92 MG/DL (ref 70–99)
HCT VFR BLD AUTO: 22.9 % (ref 35–47)
HGB BLD-MCNC: 7.6 G/DL (ref 11.7–15.7)
IMM GRANULOCYTES # BLD: 0 10E9/L (ref 0–0.4)
IMM GRANULOCYTES NFR BLD: 0.4 %
LYMPHOCYTES # BLD AUTO: 0.3 10E9/L (ref 0.8–5.3)
LYMPHOCYTES NFR BLD AUTO: 3 %
MAGNESIUM SERPL-MCNC: 1.7 MG/DL (ref 1.6–2.3)
MCH RBC QN AUTO: 31.5 PG (ref 26.5–33)
MCHC RBC AUTO-ENTMCNC: 33.2 G/DL (ref 31.5–36.5)
MCV RBC AUTO: 95 FL (ref 78–100)
MONOCYTES # BLD AUTO: 0.2 10E9/L (ref 0–1.3)
MONOCYTES NFR BLD AUTO: 2.3 %
NEUTROPHILS # BLD AUTO: 9.4 10E9/L (ref 1.6–8.3)
NEUTROPHILS NFR BLD AUTO: 92.4 %
NRBC # BLD AUTO: 0 10*3/UL
NRBC BLD AUTO-RTO: 0 /100
PHOSPHATE SERPL-MCNC: 3.2 MG/DL (ref 2.5–4.5)
PLATELET # BLD AUTO: 357 10E9/L (ref 150–450)
POTASSIUM SERPL-SCNC: 3.9 MMOL/L (ref 3.4–5.3)
RBC # BLD AUTO: 2.41 10E12/L (ref 3.8–5.2)
SODIUM SERPL-SCNC: 138 MMOL/L (ref 133–144)
TACROLIMUS BLD-MCNC: 5.8 UG/L (ref 5–15)
TME LAST DOSE: NORMAL H
WBC # BLD AUTO: 10.2 10E9/L (ref 4–11)

## 2019-08-18 PROCEDURE — 85025 COMPLETE CBC W/AUTO DIFF WBC: CPT | Performed by: INTERNAL MEDICINE

## 2019-08-18 PROCEDURE — 83735 ASSAY OF MAGNESIUM: CPT | Performed by: INTERNAL MEDICINE

## 2019-08-18 PROCEDURE — 84100 ASSAY OF PHOSPHORUS: CPT | Performed by: INTERNAL MEDICINE

## 2019-08-18 PROCEDURE — 80048 BASIC METABOLIC PNL TOTAL CA: CPT | Performed by: INTERNAL MEDICINE

## 2019-08-18 PROCEDURE — 36415 COLL VENOUS BLD VENIPUNCTURE: CPT

## 2019-08-18 PROCEDURE — 80197 ASSAY OF TACROLIMUS: CPT | Performed by: INTERNAL MEDICINE

## 2019-08-18 RX ORDER — NYSTATIN 100000/ML
500000 SUSPENSION, ORAL (FINAL DOSE FORM) ORAL 2 TIMES DAILY
Qty: 140 ML | Refills: 0 | Status: SHIPPED | OUTPATIENT
Start: 2019-08-18 | End: 2019-08-18 | Stop reason: DRUGHIGH

## 2019-08-18 RX ORDER — NYSTATIN 100000/ML
500000 SUSPENSION, ORAL (FINAL DOSE FORM) ORAL 4 TIMES DAILY
Qty: 280 ML | Refills: 0 | Status: SHIPPED | OUTPATIENT
Start: 2019-08-18 | End: 2019-08-23 | Stop reason: ALTCHOICE

## 2019-08-18 NOTE — PROGRESS NOTES
"Mona Wagner came to [facility] today for a lab and assess following a kidney transplant on 8/3/19.      Discharge date: 8/12/19  Transplant coordinator: Laurence Vazquez RN  Phone number patient can be reached at: [phone]      Physical Assessment:  See physical assessment located under \"Document Flowsheets\".  Incision site: C/D/I, no redness. Dermabonded  Lines: NA  Ramirez: NA  Urine clarity: clear in color, going often. Patient reported being up every hour last night urinating. Patient urinating more and edema has become much better per patient.  Hydration: Patient instructed to drink only to thirst. Patient has been measuring output and \"replenishing amounts coming out.\"   Nutrition: Appetite is getting better each day. Taste buds are affecting appetite as well. Patient also noticed a thin white layer across her tongue. Nystatin swish and swallow started by Dr. Wilson.  Last BM: this morning, soft. Patient reports having very painful hemorrhoid with BM's to the point of having fear of pain with BM's  Pain: 8/10--hemorrhoid pain, 3/10--incisional pain   Skin: Patient reported feet itching throughout the night last night. It subsided this morning and no itching noted today. Patient applied Gold Bond cream last night to both feet.    Labs drawn by [facility] staff Yes    Plan of care for today: Medications reviewed with patient. Patient did not have medication card with her today but able to review verbally with her.     Patient's majority of pain due to hemorrhoids. Dr. Wilson previously ordered preparation H cream but preparation H suppository form ordered for twice daily to start today.    Patient reported dizziness yesterday and when she took her BP it was 110/65, which is lower than normal for her. She was asking if she should hold her amlodipine dose today. This was discussed with Dr. Wilson and decision was made to hold dose today.    Medication changes:   1. HOLD vitamin D3 until Calcium level normalizes.  2. HOLD " amlodipine today.  3.Start Nystatin 500,000 units 4 times daily for 2 weeks.  4. Start preparation H suppository twice daily as needed for hemorrhoids.     Medications administered:  none    Patient education:    The following teaching topics were addressed: Incisional care, Signs/symptoms of infection, Good handwashing, Medications (purposes, doses and times of administration), Phone numbers to call with concenrs (Transplant coordinator, [address] and [facility]), 7A discharge check list and Plan of care   Patient verbalized understanding and all questions answered.    Drug level:  Tacrolimus level today reviewed with Dr Wilson who gave orders to increase Prograf dose to 9 mg BID.  Patient was updated with this information and verbalized understanding.    Face to face time: 60 minutes  Discharge Plan    Pt will follow up with Transplant Clinic tomorrow for appointment with Dr. Johnson. Home Care activated today. Patient does no have appointment with Dr. Johnson until 1:00 pm, so home care will draw labs starting tomorrow morning.  Discharge instructions reviewed with patient: YES  Patient/Representative verbalized understanding, all questions answered: YES    Discharged from unit at 0900 with whom:  to home.    Alba Naylor RN

## 2019-08-19 ENCOUNTER — TELEPHONE (OUTPATIENT)
Dept: TRANSPLANT | Facility: CLINIC | Age: 29
End: 2019-08-19

## 2019-08-19 ENCOUNTER — OFFICE VISIT (OUTPATIENT)
Dept: TRANSPLANT | Facility: CLINIC | Age: 29
End: 2019-08-19
Attending: INTERNAL MEDICINE
Payer: MEDICARE

## 2019-08-19 ENCOUNTER — RECORDS - HEALTHEAST (OUTPATIENT)
Dept: LAB | Facility: CLINIC | Age: 29
End: 2019-08-19

## 2019-08-19 ENCOUNTER — HOME CARE/HOSPICE - HEALTHEAST (OUTPATIENT)
Dept: HOME HEALTH SERVICES | Facility: HOME HEALTH | Age: 29
End: 2019-08-19

## 2019-08-19 VITALS
WEIGHT: 118.8 LBS | OXYGEN SATURATION: 100 % | SYSTOLIC BLOOD PRESSURE: 123 MMHG | DIASTOLIC BLOOD PRESSURE: 82 MMHG | HEART RATE: 98 BPM | BODY MASS INDEX: 23.2 KG/M2

## 2019-08-19 DIAGNOSIS — R19.7 DIARRHEA: Primary | ICD-10-CM

## 2019-08-19 DIAGNOSIS — Z94.0 KIDNEY REPLACED BY TRANSPLANT: ICD-10-CM

## 2019-08-19 DIAGNOSIS — R19.7 DIARRHEA: ICD-10-CM

## 2019-08-19 DIAGNOSIS — N39.0 URINARY TRACT INFECTION WITHOUT HEMATURIA, SITE UNSPECIFIED: ICD-10-CM

## 2019-08-19 DIAGNOSIS — Z94.0 KIDNEY REPLACED BY TRANSPLANT: Primary | ICD-10-CM

## 2019-08-19 DIAGNOSIS — K62.89 RECTAL PAIN: ICD-10-CM

## 2019-08-19 DIAGNOSIS — N39.0 URINARY TRACT INFECTION, SITE NOT SPECIFIED: ICD-10-CM

## 2019-08-19 LAB
ALBUMIN UR-MCNC: NEGATIVE MG/DL
ANION GAP SERPL CALCULATED.3IONS-SCNC: 9 MMOL/L (ref 5–18)
APPEARANCE UR: CLEAR
BACTERIA #/AREA URNS HPF: ABNORMAL /HPF
BILIRUB UR QL STRIP: NEGATIVE
BUN SERPL-MCNC: 44 MG/DL (ref 8–22)
C COLI+JEJUNI+LARI FUSA STL QL NAA+PROBE: NOT DETECTED
C DIFF TOX B STL QL: NEGATIVE
CALCIUM SERPL-MCNC: 11.1 MG/DL (ref 8.5–10.5)
CHLORIDE BLD-SCNC: 109 MMOL/L (ref 98–107)
CO2 SERPL-SCNC: 20 MMOL/L (ref 22–31)
COLOR UR AUTO: YELLOW
CREAT SERPL-MCNC: 3.4 MG/DL (ref 0.6–1.1)
EC STX1 GENE STL QL NAA+PROBE: NOT DETECTED
EC STX2 GENE STL QL NAA+PROBE: NOT DETECTED
ENTERIC PATHOGEN COMMENT: NORMAL
ERYTHROCYTE [DISTWIDTH] IN BLOOD BY AUTOMATED COUNT: 13.3 % (ref 11–14.5)
GFR SERPL CREATININE-BSD FRML MDRD: 16 ML/MIN/1.73M2
GLUCOSE BLD-MCNC: 92 MG/DL (ref 70–125)
GLUCOSE UR STRIP-MCNC: 50 MG/DL
HCT VFR BLD AUTO: 22.7 % (ref 35–47)
HGB BLD-MCNC: 7.3 G/DL (ref 12–16)
HGB UR QL STRIP: ABNORMAL
KETONES UR STRIP-MCNC: NEGATIVE MG/DL
LEUKOCYTE ESTERASE UR QL STRIP: NEGATIVE
MAGNESIUM SERPL-MCNC: 1.8 MG/DL (ref 1.8–2.6)
MCH RBC QN AUTO: 31.3 PG (ref 27–34)
MCHC RBC AUTO-ENTMCNC: 32.2 G/DL (ref 32–36)
MCV RBC AUTO: 97 FL (ref 80–100)
MUCOUS THREADS #/AREA URNS LPF: PRESENT /LPF
NITRATE UR QL: NEGATIVE
NOROV GI+II ORF1-ORF2 JNC STL QL NAA+PR: NOT DETECTED
PH UR STRIP: 6 PH (ref 5–7)
PHOSPHATE SERPL-MCNC: 3.4 MG/DL (ref 2.5–4.5)
PLATELET # BLD AUTO: 356 THOU/UL (ref 140–440)
PMV BLD AUTO: 8.4 FL (ref 8.5–12.5)
POTASSIUM BLD-SCNC: 4.6 MMOL/L (ref 3.5–5)
RBC # BLD AUTO: 2.33 MILL/UL (ref 3.8–5.4)
RBC #/AREA URNS AUTO: 3 /HPF (ref 0–2)
RVA NSP5 STL QL NAA+PROBE: NOT DETECTED
SALMONELLA SP RPOD STL QL NAA+PROBE: NOT DETECTED
SHIGELLA SP+EIEC IPAH STL QL NAA+PROBE: NOT DETECTED
SODIUM SERPL-SCNC: 138 MMOL/L (ref 136–145)
SOURCE: ABNORMAL
SP GR UR STRIP: 1.01 (ref 1–1.03)
SPECIMEN SOURCE: NORMAL
SQUAMOUS #/AREA URNS AUTO: <1 /HPF (ref 0–1)
TACROLIMUS BLD-MCNC: 7.6 UG/L (ref 5–15)
TACROLIMUS LAST DOSE: NORMAL
UROBILINOGEN UR STRIP-MCNC: 0 MG/DL (ref 0–2)
V CHOL+PARA RFBL+TRKH+TNAA STL QL NAA+PR: NOT DETECTED
WBC #/AREA URNS AUTO: 2 /HPF (ref 0–5)
WBC: 8.1 THOU/UL (ref 4–11)
Y ENTERO RECN STL QL NAA+PROBE: NOT DETECTED

## 2019-08-19 PROCEDURE — 87506 IADNA-DNA/RNA PROBE TQ 6-11: CPT | Performed by: INTERNAL MEDICINE

## 2019-08-19 PROCEDURE — 81001 URINALYSIS AUTO W/SCOPE: CPT | Performed by: NURSE PRACTITIONER

## 2019-08-19 PROCEDURE — 87086 URINE CULTURE/COLONY COUNT: CPT | Performed by: NURSE PRACTITIONER

## 2019-08-19 PROCEDURE — 87493 C DIFF AMPLIFIED PROBE: CPT | Mod: XU | Performed by: INTERNAL MEDICINE

## 2019-08-19 RX ORDER — MYCOPHENOLIC ACID 180 MG/1
540 TABLET, DELAYED RELEASE ORAL 2 TIMES DAILY
Qty: 180 TABLET | Refills: 11 | Status: SHIPPED | OUTPATIENT
Start: 2019-08-19 | End: 2020-09-04

## 2019-08-19 ASSESSMENT — PAIN SCALES - GENERAL: PAINLEVEL: MILD PAIN (3)

## 2019-08-19 NOTE — TELEPHONE ENCOUNTER
Message to physician: PLEASE VERIFY PATIENT REQUESTING A NEW RX FOR NATURE FIBER POWDER NOT IN Epic OR CURRENT MED LIST.    Date of last visit: 3/5/2019     Date of next visit if scheduled: Visit date not found       Last Comprehensive Metabolic Panel:  Sodium   Date Value Ref Range Status   08/18/2019 138 133 - 144 mmol/L Final     Potassium   Date Value Ref Range Status   08/18/2019 3.9 3.4 - 5.3 mmol/L Final     Chloride   Date Value Ref Range Status   08/18/2019 106 94 - 109 mmol/L Final     Carbon Dioxide   Date Value Ref Range Status   08/18/2019 25 20 - 32 mmol/L Final     Anion Gap   Date Value Ref Range Status   08/18/2019 7 3 - 14 mmol/L Final     Glucose   Date Value Ref Range Status   08/18/2019 92 70 - 99 mg/dL Final     Urea Nitrogen   Date Value Ref Range Status   08/18/2019 56 (H) 7 - 30 mg/dL Final     Creatinine   Date Value Ref Range Status   08/18/2019 4.31 (H) 0.52 - 1.04 mg/dL Final     GFR Estimate   Date Value Ref Range Status   08/18/2019 13 (L) >60 mL/min/[1.73_m2] Final     Comment:     Non  GFR Calc  Starting 12/18/2018, serum creatinine based estimated GFR (eGFR) will be   calculated using the Chronic Kidney Disease Epidemiology Collaboration   (CKD-EPI) equation.       Calcium   Date Value Ref Range Status   08/18/2019 10.5 (H) 8.5 - 10.1 mg/dL Final       BP Readings from Last 3 Encounters:   08/19/19 123/82   08/15/19 137/80   08/14/19 (!) 148/91       Lab Results   Component Value Date    A1C 4.8 08/03/2019    A1C 4.8 08/02/2019                Please complete refill and CLOSE ENCOUNTER.  Closing the encounter signifies the refill is complete.

## 2019-08-19 NOTE — TELEPHONE ENCOUNTER
Appears dry on labs.  Hydrate, as dicussed this AM.    Diarrhea is slightly improved today after staring fiber and REDUCING antimetabolite to myfortic 540 BID.    Has Mona ever been on cinacalcet? Mona reports she has been on this before.  Has it caused GI distrubance? She reports nausea and fatigue.     Mona does feel dehydrated despite drinking 3L yesterday.  Mona's BP is currently running 140 / 85 seated.  137 / 96 standing.    Denies any lightheadedness / dizziness.  Weight is stable today at 118.    Will monitor closely to assess need for IV hydration.   Mona does have night sweats, denies need to change pajamas. Will monitor closely.

## 2019-08-19 NOTE — LETTER
"8/19/2019       RE: Mona Wagner  [address]  [place] [address]     Dear Colleague,    Thank you for referring your patient, Mona Wagner, to the [facility] SOLID ORGAN TRANSPLANT at [facility]. Please see a copy of my visit note below.    Transplant Surgery Progress Note    Date of Visit: 08/19/2019    Assessment:   - Cr starting to improve.  Hopefully kidney is starting to open up.  If Cr does not continue decline will need bx.    - Rectal pain with probable internal and external hemorrhoid.  - Diarrhea.  Likely medication related.  Will stop phosphate binder, add fiber, hold laxatives, decrease mmf.  - Thrush on nystatin  - Some dysuria - check UA    Plan:    1. Graft function: No change.  Hopefully last HD last week  2. Immunosuppression Management: Changed , swithc MMF to MPA for diarrhea. .  Complexity of management:Medium.  Contributing factors: organ dysfunction, diarrhea and active infection  3. diarrhea: stop renvala.  Stop stool softeners.  Decrease diuretics.  Switch to myfortic.  Check stool for enteric pathogens.  4.  Rectal pain.  Will ask colorectal to evaluate  5.  Check UA for dysuria  6.  Nystatin for thrush  7. Stent out in a few weeks.    Followup: routine    Total Time: 30 min,   Counselling Time: 5 min.              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  Last HD 4 days ago.  Cr has dropped overnight tonight from 4 to 3.4.  Has some rectal pain and small lump.  Treated with preparation H with a little improvement.  Planned for colorectal eval by nephrology.  Had constipation in hospital then soft BM, now diarrhea x 2 days.  Off of stool softener.  Has mild dysuria.  No hematuria.  C Diff neg.    Transplant [facility]  [phone]  Donor type:  DCD  DSA at time of transplant:  NO  Ureteral stent: YES  CMV:  Donor + / Recipient +  EBV:  Donor - / " Recipient +  Thymoglobulin:  325mg, 6mg/kg      Review of Systems:   CONSTITUTIONAL:  No fevers or chills  EYES: negative for icterus  ENT:  negative for hearing loss, tinnitus and sore throat  RESPIRATORY:  negative for cough, sputum, dyspnea  CARDIOVASCULAR:  negative for chest pain   GASTROINTESTINAL:  negative for nausea, vomiting, diarrhea or constipation  GENITOURINARY:  negative for incontinence, dysuria, bladder emptying problems  HEME:  No easy bruising  INTEGUMENT:  negative for rash and pruritus  NEURO:  Negative for headache, seizure disorder    Immunosuppression:    Immunosuppressant Medications     Immunosuppressive Agents Disp Start End     mycophenolate (GENERIC EQUIVALENT) 250 MG capsule    180 capsule 8/12/2019     Sig - Route: Take 3 capsules (750 mg) by mouth 2 times daily - Oral    Class: E-Prescribe     tacrolimus (GENERIC EQUIVALENT) 0.5 MG capsule    30 capsule 8/12/2019     Sig: Take 1 cap by mouth twice daily as directed by Transplant Center for dose adjustment    Class: E-Prescribe     tacrolimus (GENERIC EQUIVALENT) 1 MG capsule    300 capsule 8/16/2019     Sig - Route: Take 7 mg by mouth 2 times daily Take 9mg daily - Oral    Class: Historical          Possible Immunosuppression-related side effects:   []             headache  []             vivid dreams  []             irritability  []             fine tremor  []             nausea  [x]             diarrhea  []             neuropathy      []             edema  []             renal calcineurin toxicity  []             hyperkalemia  []             post-transplant diabetes  []             decreased appetite  []             increased appetite  []             other:  []             None    Prophylaxis:    [x]             Valcyte  []             Mycelex  [x]             Bactrim  []             Dapsone  []             Protonix  [x]             Other:  Nystatin    Prescription Medications as of 8/19/2019       Rx Number Disp Refills Start End  Last Dispensed Date Next Fill Date Owning Pharmacy    acetaminophen (TYLENOL) 325 MG tablet  100 tablet 3 8/2/2019    [facility] #[id number] - [place], [place] - [address] [place] [address]    Sig: Take 2 tablets (650 mg) by mouth every 4 hours as needed for mild pain    Class: E-Prescribe    Route: Oral    aspirin (ASA) 81 MG chewable tablet  30 tablet 5 8/13/2019    [address]    Sig: Take 1 tablet (81 mg) by mouth daily    Class: E-Prescribe    Route: Oral    atorvastatin (LIPITOR) 10 MG tablet  30 tablet 11 8/13/2019    [address]    Sig: Take 1 tablet (10 mg) by mouth daily    Class: E-Prescribe    Route: Oral    diphenhydrAMINE (BENADRYL) 25 MG tablet  60 tablet 1 1/17/2018    [facility] #[id number] - [place], [place] - [address] [place] [address]    Sig: Take 1-2 tablets (25-50 mg) by mouth every 6 hours as needed for itching or allergies    Class: E-Prescribe    Route: Oral    docusate sodium (COLACE) 100 MG tablet    1/29/2019    [facility] #[id number] - [place], [place] - [address]    Sig: Take 1 tablet (100 mg) by mouth daily as needed for constipation    Class: Historical    Route: Oral    EPINEPHrine (EPIPEN/ADRENACLICK/OR ANY BX GENERIC EQUIV) 0.3 MG/0.3ML injection 2-pack  0.6 mL 3 1/17/2018    [facility] #[id number] - [place], [place] - [address] [facility]    Sig: Inject 0.3 mLs (0.3 mg) into the muscle as needed for anaphylaxis    Class: E-Prescribe    Route: Intramuscular    hydrOXYzine (ATARAX) 10 MG tablet  20 tablet 0 8/12/2019    [address]    Sig: Take 1 tablet (10 mg) by mouth 3 times daily as needed for anxiety    Class: E-Prescribe    Route: Oral    Inulin (METAMUCIL CLEAR & NATURAL PO)            Class: Historical    Route:  Oral    levothyroxine (SYNTHROID/LEVOTHROID) 25 MCG tablet  105 tablet 1 5/20/2019    [id number] - [place], [place] - [address] [facility]    Sig: Take 1 tablet (25 mcg) Tuesday, Thursday, Saturday and Sunday and 1.5 tablets (37.5 mcg) on Monday, Wednesday and Friday every week.    Class: E-Prescribe    Notes to Pharmacy: PHARMACY- DISPENSE 3 MONTH SUPPLY    multivitamin RENAL (NEPHROCAPS/TRIPHROCAPS) 1 MG capsule  30 capsule 3 6/14/2019    [id number] - [place], [place] - [address] [facility]    Sig: Take 1 capsule by mouth daily    Class: E-Prescribe    Route: Oral    mycophenolate (GENERIC EQUIVALENT) 250 MG capsule  180 capsule 11 8/12/2019    [place], [place] - [address] [place] [address]    Sig: Take 3 capsules (750 mg) by mouth 2 times daily    Class: E-Prescribe    Route: Oral    nystatin (MYCOSTATIN) 906262 UNIT/ML suspension  280 mL 0 8/18/2019 9/1/2019   [id number] - [place], [place] - [address] [facility]    Sig: Take 5 mLs (500,000 Units) by mouth 4 times daily for 14 days    Class: E-Prescribe    Route: Oral    ondansetron (ZOFRAN ODT) 4 MG ODT tab  60 tablet 1 4/12/2017    [facility] #[id number] - [place], [place] - [address]    Sig: Take 1-2 tablets (4-8 mg) by mouth every 8 hours as needed for nausea    Class: E-Prescribe    Route: Oral    oxyCODONE (ROXICODONE) 5 MG tablet  12 tablet 0 8/13/2019 8/19/2019       Sig: Take 0.5 tablets (2.5 mg) by mouth every 8 hours as needed for breakthrough pain    Class: Local Print    Earliest Fill Date: 8/13/2019    Route: Oral    pentamidine (NEBUPENT) 300 MG neb solution    8/12/2019    [place], [place] - [address] [place] [address]    Sig: Inhale 300 mg into the lungs every 28 days    Class: No Print Out    Route: Inhalation    phenylephrine-cocoa butter (PREPARATION H) 0.25-88.44 % suppository  14 suppository 0  8/18/2019 8/25/2019   [facility] #[id number] - [place], [place] - [address] [facility] [address]    Sig: Place 1 suppository rectally 2 times daily as needed for hemorrhoids or itching    Class: E-Prescribe    Route: Rectal    phenylephrine-shark liver oil-mineral oil-petrolatum (PREPARATION H) 0.25-3-14-71.9 % rectal ointment  28.4 g 1 8/16/2019    [address]    Sig: Place rectally 2 times daily as needed for hemorrhoids    Class: E-Prescribe    Route: Rectal    polyethylene glycol (MIRALAX/GLYCOLAX) powder  500 g 3 8/12/2019    [address]    Sig: Take 17 g (1 capful) by mouth daily as needed for constipation    Class: E-Prescribe    Route: Oral    sevelamer (RENVELA) 800 MG tablet            Sig: Take 1600 mg by mouth three times daily with meals AND Take 800 mg by mouth with snacks.     Class: Historical    simethicone (MYLICON) 125 MG chewable tablet    1/29/2019    [facility] #[id number] - [place], [place] - [address] AT [facility] [address]    Sig: Take 1 tablet (125 mg) by mouth 4 times daily as needed for intestinal gas    Class: Historical    Route: Oral    tacrolimus (GENERIC EQUIVALENT) 0.5 MG capsule  30 capsule 0 8/12/2019    [place], [place] - [address] [place] [address]    Sig: Take 1 cap by mouth twice daily as directed by Transplant Center for dose adjustment    Class: E-Prescribe    tacrolimus (GENERIC EQUIVALENT) 1 MG capsule  300 capsule 11 8/16/2019    [address]    Sig: Take 7 mg by mouth 2 times daily Take 9mg daily    Class: Historical    Route: Oral    valGANciclovir (VALCYTE) 450 MG tablet  60 tablet 2 8/12/2019    [place], [place] - [address] [place] [address]    Sig: Take 1 tab every Monday and Thursday. Titrate dose  up to a max of 2 tabs (900 mg) by mouth daily when directed by your transplant team.    Class: E-Prescribe    amLODIPine (NORVASC) 10 MG tablet  30 tablet 5 8/15/2019    [facility] #[id number] - [place], [place] - [address] AT [facility] [address]    Sig: Take 0.5 tablets (5 mg) by mouth daily    Class: Historical    Route: Oral    bumetanide (BUMEX) 2 MG tablet  20 tablet 0 8/12/2019 8/17/2019   [facility] [place], [place] - [address] [facility]    Sig: Take 2 tablets (4 mg) by mouth 2 times daily for 5 days    Class: E-Prescribe    Route: Oral    norethindrone (MICRONOR) 0.35 MG tablet    8/12/2019        Sig: Do not restart until your follow up appointment with your surgeon. Discuss restart date at that appointment.    Class: No Print Out    vitamin D3 (CHOLECALCIFEROL) 2000 units (50 mcg) tablet  90 tablet 3 8/13/2019    [id number] - [place], [place] - [address] AT [facility] [address]    Sig: Take 1 tablet (2,000 Units) by mouth daily    Class: E-Prescribe    Route: Oral          Exam:    Pulse:  [98] 98  BP: (123)/(82) 123/82  SpO2:  [100 %] 100 %    Pulse:  [98] 98  BP: (123)/(82) 123/82  SpO2:  [100 %] 100 %  General Appearance: in no apparent distress.   Skin: Normal, no rashes or jaundice  Lungs: easy respirations, no audible wheezing.  Abdomen: flat, The wound is dry and intact      Transplant Immunosuppression Labs Latest Ref Rng & Units 8/18/2019 8/16/2019 8/15/2019 8/14/2019 8/13/2019   Tacro Level 5.0 - 15.0 ug/L 5.8 6.1 8.1 6.5 6.1   Tacro Level - Not Provided Not Provided Not Provided Not Provided Not Provided   Creat 0.52 - 1.04 mg/dL 4.31(H) 5.76(H) 9.66(H) 10.80(H) 10.70(H)   BUN 7 - 30 mg/dL 56(H) 51(H) 85(H) 82(H) 72(H)   WBC 4.0 - 11.0 10e9/L 10.2 11.4(H) 12.2(H) 10.6 9.3   Neutrophil % 92.4 92.5 94.1 92.6 92.3   ANEU 1.6 - 8.3 10e9/L 9.4(H) 10.5(H) 11.4(H) 9.8(H) 8.6(H)       Chemistries:   Recent Labs   Lab Test 08/18/19  0720   BUN 56*    CR 4.31*   GFRESTIMATED 13*   GLC 92     Lab Results   Component Value Date    A1C 4.8 08/03/2019     Recent Labs   Lab Test 08/02/19  1938   ALBUMIN 4.2   BILITOTAL 0.8   ALKPHOS 65   AST 4   ALT 13     Urine Studies:  Recent Labs   Lab Test 08/12/19  1030   COLOR Light Yellow   APPEARANCE Clear   URINEGLC Negative   URINEBILI Negative   URINEKETONE Negative   SG 1.007   UBLD Large*   URINEPH 5.5   PROTEIN 30*   NITRITE Negative   LEUKEST Small*   RBCU 82*   WBCU 3     Recent Labs   Lab Test 12/11/13  1915   UTPG 7.42*     Hematology:   Recent Labs   Lab Test 08/18/19  0720 08/16/19  0807 08/15/19  0725   HGB 7.6* 7.6* 8.7*    292 329   WBC 10.2 11.4* 12.2*     Coags:   Recent Labs   Lab Test 08/02/19  1938 03/16/17  0954 02/21/17  1418   INR 0.94 <0.9 0.93     HLA antibodies:   SA1 Hi Risk Noemy   Date Value Ref Range Status   08/02/2019 Cw:17  Final     SA1 Mod Risk Noemy   Date Value Ref Range Status   08/02/2019 None  Final     SA2 Hi Risk Noemy   Date Value Ref Range Status   08/02/2019 None  Final     SA2 Mod Risk Noemy   Date Value Ref Range Status   08/02/2019 DR:7 DP:09 10 14 17  Final           Again, thank you for allowing me to participate in the care of your patient.      Sincerely,    Dorian Johnson MD

## 2019-08-19 NOTE — TELEPHONE ENCOUNTER
RNCC spoke with Sachi and Mona this AM -   Sachi was able to draw one purple and one red top. RNCC advised BMP, CBC and tacro are priority labs.     Labs will be dropped off at [facility].    BP is running 118 / 80 (seated).  128 / 84 (standing)    Mona is having diarrhea and headaches, hemorrhoids continue to be painful. PrepH suppository is helping only somewhat.     BMs are 5+ daily.  BMs are loose / watery. Green / yellow in color.     Mona is NOT currently taking a fiber supplement - RNCC advised to start taking fiber OTC 1-2x daily.      Dull / persistent headaches 2-3/10.  She feels she is peeing more than she is drinking.  Drinking 3-4 bottles of water. Swelling is no longer present.  Weight is now down to 118 (pre tx baseline = 118).  RNCC advised increased oral intake of 5-6 bottles daily.    Holding amlodipine per Dr. Wilson in ATC yesterday.     Mona voiced understanding of when to call SOT to discuss IV hydration.     Will enter orders for Cdiff and OLVIN to be completed today prior to surgeon visit.

## 2019-08-19 NOTE — PROGRESS NOTES
"Transplant Surgery Progress Note    Date of Visit: 08/19/2019    Assessment:   - Cr starting to improve.  Hopefully kidney is starting to open up.  If Cr does not continue decline will need bx.    - Rectal pain with probable internal and external hemorrhoid.  - Diarrhea.  Likely medication related.  Will stop phosphate binder, add fiber, hold laxatives, decrease mmf.  - Thrush on nystatin  - Some dysuria - check UA    Plan:    1. Graft function: No change.  Hopefully last HD last week  2. Immunosuppression Management: Changed , swithc MMF to MPA for diarrhea. .  Complexity of management:Medium.  Contributing factors: organ dysfunction, diarrhea and active infection  3. diarrhea: stop renvala.  Stop stool softeners.  Decrease diuretics.  Switch to myfortic.  Check stool for enteric pathogens.  4.  Rectal pain.  Will ask colorectal to evaluate  5.  Check UA for dysuria  6.  Nystatin for thrush  7. Stent out in a few weeks.    Followup: routine    Total Time: 30 min,   Counselling Time: 5 min.              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  Last HD 4 days ago.  Cr has dropped overnight tonight from 4 to 3.4.  Has some rectal pain and small lump.  Treated with preparation H with a little improvement.  Planned for colorectal eval by nephrology.  Had constipation in hospital then soft BM, now diarrhea x 2 days.  Off of stool softener.  Has mild dysuria.  No hematuria.  C Diff neg.    [facility]  [phone]  Donor type:  DCD  DSA at time of transplant:  NO  Ureteral stent: YES  CMV:  Donor + / Recipient +  EBV:  Donor - / Recipient +  Thymoglobulin:  325mg, 6mg/kg      Review of Systems:   CONSTITUTIONAL:  No fevers or chills  EYES: negative for icterus  ENT:  negative for hearing loss, tinnitus and sore throat  RESPIRATORY:  negative for cough, sputum, dyspnea  CARDIOVASCULAR:  negative for chest " pain   GASTROINTESTINAL:  negative for nausea, vomiting, diarrhea or constipation  GENITOURINARY:  negative for incontinence, dysuria, bladder emptying problems  HEME:  No easy bruising  INTEGUMENT:  negative for rash and pruritus  NEURO:  Negative for headache, seizure disorder    Immunosuppression:    Immunosuppressant Medications     Immunosuppressive Agents Disp Start End     mycophenolate (GENERIC EQUIVALENT) 250 MG capsule    180 capsule 8/12/2019     Sig - Route: Take 3 capsules (750 mg) by mouth 2 times daily - Oral    Class: E-Prescribe     tacrolimus (GENERIC EQUIVALENT) 0.5 MG capsule    30 capsule 8/12/2019     Sig: Take 1 cap by mouth twice daily as directed by Transplant Center for dose adjustment    Class: E-Prescribe     tacrolimus (GENERIC EQUIVALENT) 1 MG capsule    300 capsule 8/16/2019     Sig - Route: Take 7 mg by mouth 2 times daily Take 9mg daily - Oral    Class: Historical          Possible Immunosuppression-related side effects:   []             headache  []             vivid dreams  []             irritability  []             fine tremor  []             nausea  [x]             diarrhea  []             neuropathy      []             edema  []             renal calcineurin toxicity  []             hyperkalemia  []             post-transplant diabetes  []             decreased appetite  []             increased appetite  []             other:  []             None    Prophylaxis:    [x]             Valcyte  []             Mycelex  [x]             Bactrim  []             Dapsone  []             Protonix  [x]             Other:  Nystatin    Prescription Medications as of 8/19/2019       Rx Number Disp Refills Start End Last Dispensed Date Next Fill Date Owning Pharmacy    acetaminophen (TYLENOL) 325 MG tablet  100 tablet 3 8/2/2019    [facility] #[id number] - [place], [place] - [address] [facility] [address]    Sig: Take 2 tablets (650 mg) by mouth every 4 hours as needed for mild  pain    Class: E-Prescribe    Route: Oral    aspirin (ASA) 81 MG chewable tablet  30 tablet 5 8/13/2019    [address]    Sig: Take 1 tablet (81 mg) by mouth daily    Class: E-Prescribe    Route: Oral    atorvastatin (LIPITOR) 10 MG tablet  30 tablet 11 8/13/2019    [address]    Sig: Take 1 tablet (10 mg) by mouth daily    Class: E-Prescribe    Route: Oral    diphenhydrAMINE (BENADRYL) 25 MG tablet  60 tablet 1 1/17/2018    [id number] - [place], [place] - [address] [facility]    Sig: Take 1-2 tablets (25-50 mg) by mouth every 6 hours as needed for itching or allergies    Class: E-Prescribe    Route: Oral    docusate sodium (COLACE) 100 MG tablet    1/29/2019    [id number] - [place], [place] - [address] [facility]    Sig: Take 1 tablet (100 mg) by mouth daily as needed for constipation    Class: Historical    Route: Oral    EPINEPHrine (EPIPEN/ADRENACLICK/OR ANY BX GENERIC EQUIV) 0.3 MG/0.3ML injection 2-pack  0.6 mL 3 1/17/2018    [id number] - [place], [place] - [address] [facility]    Sig: Inject 0.3 mLs (0.3 mg) into the muscle as needed for anaphylaxis    Class: E-Prescribe    Route: Intramuscular    hydrOXYzine (ATARAX) 10 MG tablet  20 tablet 0 8/12/2019    [address]    Sig: Take 1 tablet (10 mg) by mouth 3 times daily as needed for anxiety    Class: E-Prescribe    Route: Oral    Inulin (METAMUCIL CLEAR & NATURAL PO)            Class: Historical    Route: Oral    levothyroxine (SYNTHROID/LEVOTHROID) 25 MCG tablet  105 tablet 1 5/20/2019    [facility][id number] - [place], [place] - [address] [facility]    Sig: Take 1 tablet (25 mcg) Tuesday, Thursday, Saturday and Sunday and 1.5 tablets (37.5 mcg) on  Monday, Wednesday and Friday every week.    Class: E-Prescribe    Notes to Pharmacy: PHARMACY- DISPENSE 3 MONTH SUPPLY    multivitamin RENAL (NEPHROCAPS/TRIPHROCAPS) 1 MG capsule  30 capsule 3 6/14/2019    [facility] #[id number] - [place], [place] - [address] [facility] [address] [facility]    Sig: Take 1 capsule by mouth daily    Class: E-Prescribe    Route: Oral    mycophenolate (GENERIC EQUIVALENT) 250 MG capsule  180 capsule 11 8/12/2019    [place], [place] - [address] [place] [address]    Sig: Take 3 capsules (750 mg) by mouth 2 times daily    Class: E-Prescribe    Route: Oral    nystatin (MYCOSTATIN) 307820 UNIT/ML suspension  280 mL 0 8/18/2019 9/1/2019   [facility] #[id number] - [place], [place] - [address] [facility] [address] [facility]    Sig: Take 5 mLs (500,000 Units) by mouth 4 times daily for 14 days    Class: E-Prescribe    Route: Oral    ondansetron (ZOFRAN ODT) 4 MG ODT tab  60 tablet 1 4/12/2017    [facility] #[id number] - [place], [place] - [address]    Sig: Take 1-2 tablets (4-8 mg) by mouth every 8 hours as needed for nausea    Class: E-Prescribe    Route: Oral    oxyCODONE (ROXICODONE) 5 MG tablet  12 tablet 0 8/13/2019 8/19/2019       Sig: Take 0.5 tablets (2.5 mg) by mouth every 8 hours as needed for breakthrough pain    Class: Local Print    Earliest Fill Date: 8/13/2019    Route: Oral    pentamidine (NEBUPENT) 300 MG neb solution    8/12/2019    [place], [place] - [address] [facility]    Sig: Inhale 300 mg into the lungs every 28 days    Class: No Print Out    Route: Inhalation    phenylephrine-cocoa butter (PREPARATION H) 0.25-88.44 % suppository  14 suppository 0 8/18/2019 8/25/2019   [id number] - [place], [place] - [address] [facility] [address] [facility]    Sig: Place 1 suppository rectally 2 times daily as needed for hemorrhoids or itching    Class: E-Prescribe    Route: Rectal    phenylephrine-shark liver oil-mineral  oil-petrolatum (PREPARATION H) 0.25-3-14-71.9 % rectal ointment  28.4 g 1 8/16/2019    [address]    Sig: Place rectally 2 times daily as needed for hemorrhoids    Class: E-Prescribe    Route: Rectal    polyethylene glycol (MIRALAX/GLYCOLAX) powder  500 g 3 8/12/2019    [place], [place] - [address] [place] [address]    Sig: Take 17 g (1 capful) by mouth daily as needed for constipation    Class: E-Prescribe    Route: Oral    sevelamer (RENVELA) 800 MG tablet            Sig: Take 1600 mg by mouth three times daily with meals AND Take 800 mg by mouth with snacks.     Class: Historical    simethicone (MYLICON) 125 MG chewable tablet    1/29/2019    [facility] #[id number] - [place], [place] - [address] [facility] [address]    Sig: Take 1 tablet (125 mg) by mouth 4 times daily as needed for intestinal gas    Class: Historical    Route: Oral    tacrolimus (GENERIC EQUIVALENT) 0.5 MG capsule  30 capsule 0 8/12/2019    [place], [place] - [address] [place] [address]    Sig: Take 1 cap by mouth twice daily as directed by Transplant Center for dose adjustment    Class: E-Prescribe    tacrolimus (GENERIC EQUIVALENT) 1 MG capsule  300 capsule 11 8/16/2019    [address]    Sig: Take 7 mg by mouth 2 times daily Take 9mg daily    Class: Historical    Route: Oral    valGANciclovir (VALCYTE) 450 MG tablet  60 tablet 2 8/12/2019    [address]    Sig: Take 1 tab every Monday and Thursday. Titrate dose up to a max of 2 tabs (900 mg) by mouth daily when directed by your transplant team.    Class: E-Prescribe    amLODIPine (NORVASC) 10 MG tablet  30 tablet 5 8/15/2019    [facility] #[id number] - [place], [place] - [address] AT [facility] [address]    Sig: Take 0.5  tablets (5 mg) by mouth daily    Class: Historical    Route: Oral    bumetanide (BUMEX) 2 MG tablet  20 tablet 0 8/12/2019 8/17/2019   [facility] [place], [place] - [address] [facility]    Sig: Take 2 tablets (4 mg) by mouth 2 times daily for 5 days    Class: E-Prescribe    Route: Oral    norethindrone (MICRONOR) 0.35 MG tablet    8/12/2019        Sig: Do not restart until your follow up appointment with your surgeon. Discuss restart date at that appointment.    Class: No Print Out    vitamin D3 (CHOLECALCIFEROL) 2000 units (50 mcg) tablet  90 tablet 3 8/13/2019    [facility] #[id number] - [place], [place] - [address] AT [facility] [address]    Sig: Take 1 tablet (2,000 Units) by mouth daily    Class: E-Prescribe    Route: Oral          Exam:    Pulse:  [98] 98  BP: (123)/(82) 123/82  SpO2:  [100 %] 100 %    Pulse:  [98] 98  BP: (123)/(82) 123/82  SpO2:  [100 %] 100 %  General Appearance: in no apparent distress.   Skin: Normal, no rashes or jaundice  Lungs: easy respirations, no audible wheezing.  Abdomen: flat, The wound is dry and intact      Transplant Immunosuppression Labs Latest Ref Rng & Units 8/18/2019 8/16/2019 8/15/2019 8/14/2019 8/13/2019   Tacro Level 5.0 - 15.0 ug/L 5.8 6.1 8.1 6.5 6.1   Tacro Level - Not Provided Not Provided Not Provided Not Provided Not Provided   Creat 0.52 - 1.04 mg/dL 4.31(H) 5.76(H) 9.66(H) 10.80(H) 10.70(H)   BUN 7 - 30 mg/dL 56(H) 51(H) 85(H) 82(H) 72(H)   WBC 4.0 - 11.0 10e9/L 10.2 11.4(H) 12.2(H) 10.6 9.3   Neutrophil % 92.4 92.5 94.1 92.6 92.3   ANEU 1.6 - 8.3 10e9/L 9.4(H) 10.5(H) 11.4(H) 9.8(H) 8.6(H)       Chemistries:   Recent Labs   Lab Test 08/18/19  0720   BUN 56*   CR 4.31*   GFRESTIMATED 13*   GLC 92     Lab Results   Component Value Date    A1C 4.8 08/03/2019     Recent Labs   Lab Test 08/02/19  1938   ALBUMIN 4.2   BILITOTAL 0.8   ALKPHOS 65   AST 4   ALT 13     Urine Studies:  Recent Labs   Lab Test 08/12/19  1030   COLOR Light  Yellow   APPEARANCE Clear   URINEGLC Negative   URINEBILI Negative   URINEKETONE Negative   SG 1.007   UBLD Large*   URINEPH 5.5   PROTEIN 30*   NITRITE Negative   LEUKEST Small*   RBCU 82*   WBCU 3     Recent Labs   Lab Test 12/11/13  1915   UTPG 7.42*     Hematology:   Recent Labs   Lab Test 08/18/19  0720 08/16/19  0807 08/15/19  0725   HGB 7.6* 7.6* 8.7*    292 329   WBC 10.2 11.4* 12.2*     Coags:   Recent Labs   Lab Test 08/02/19  1938 03/16/17  0954 02/21/17  1418   INR 0.94 <0.9 0.93     HLA antibodies:   SA1 Hi Risk Noemy   Date Value Ref Range Status   08/02/2019 Cw:17  Final     SA1 Mod Risk Noemy   Date Value Ref Range Status   08/02/2019 None  Final     SA2 Hi Risk Noemy   Date Value Ref Range Status   08/02/2019 None  Final     SA2 Mod Risk Noemy   Date Value Ref Range Status   08/02/2019 DR:7 DP:09 10 14 17  Final

## 2019-08-20 ENCOUNTER — TELEPHONE (OUTPATIENT)
Dept: TRANSPLANT | Facility: CLINIC | Age: 29
End: 2019-08-20

## 2019-08-20 ENCOUNTER — INFUSION THERAPY VISIT (OUTPATIENT)
Dept: INFUSION THERAPY | Facility: CLINIC | Age: 29
End: 2019-08-20
Attending: INTERNAL MEDICINE
Payer: MEDICARE

## 2019-08-20 VITALS
OXYGEN SATURATION: 100 % | SYSTOLIC BLOOD PRESSURE: 138 MMHG | HEART RATE: 87 BPM | TEMPERATURE: 98.2 F | DIASTOLIC BLOOD PRESSURE: 88 MMHG

## 2019-08-20 DIAGNOSIS — Z48.298 AFTERCARE FOLLOWING ORGAN TRANSPLANT: ICD-10-CM

## 2019-08-20 DIAGNOSIS — Z48.298 AFTERCARE FOLLOWING ORGAN TRANSPLANT: Primary | ICD-10-CM

## 2019-08-20 DIAGNOSIS — E03.8 OTHER SPECIFIED HYPOTHYROIDISM: Primary | ICD-10-CM

## 2019-08-20 DIAGNOSIS — Z94.0 KIDNEY REPLACED BY TRANSPLANT: Primary | ICD-10-CM

## 2019-08-20 LAB
BACTERIA SPEC CULT: NO GROWTH
Lab: NORMAL
MYCOPHENOLATE SERPL LC/MS/MS-MCNC: 1.92 MG/L (ref 1–3.5)
MYCOPHENOLATE-G SERPL LC/MS/MS-MCNC: 149.2 MG/L (ref 30–95)
SPECIMEN SOURCE: NORMAL
TME LAST DOSE: ABNORMAL H

## 2019-08-20 PROCEDURE — 96360 HYDRATION IV INFUSION INIT: CPT

## 2019-08-20 PROCEDURE — 25000128 H RX IP 250 OP 636: Mod: ZF | Performed by: INTERNAL MEDICINE

## 2019-08-20 RX ADMIN — SODIUM CHLORIDE 1000 ML: 9 INJECTION, SOLUTION INTRAVENOUS at 13:02

## 2019-08-20 NOTE — TELEPHONE ENCOUNTER
Mona called back:  She does feel that she would benefit from 1L NS.  Orders placed.    Weight is actually down today to 116lbs.   / 82 seated, 117 / 79 standing. No orthostatic hypotension.

## 2019-08-20 NOTE — PROGRESS NOTES
Nursing Note  oMna Wagner [facility]:   Chief Complaint   Patient presents with     Infusion     fluids     During [facility]'s [facility] appointment, orders from Dr Wilson were completed.  Frequency: as needed    Progress note:  Patient identification verified by name and date of birth.  Assessment completed.  Vitals recorded in Doc Flowsheets.  Patient was provided with education regarding infusion and possible side effects.  Patient verbalized understanding.     present during visit today: Not Applicable.    Treatment Conditions: non-applicable.    Pt requesting additional 1 L NS and inquiring about colorectal surgery referral appt. Coordinator was contacted who reports pt should only receive 1 L NS today and call coordinator tomorrow with BP and weight. Pt updated and verbalized understanding, pt also provided with phone number to call and schedule colorectal surg appt.     Premedications: were not ordered.    Drug Waste Record: No    Infusion length and rate:  infusion given over approximately 60 minutes  999 ml/hr.    Labs: were not ordered for this appointment.    Vascular access: peripheral IV placed today.    Post Infusion Assessment:  Patient tolerated infusion without incident.     Discharge Plan:   Follow up plan of care with: transplant coordinator as needed  Discharge instructions were reviewed with patient.  Patient/representative verbalized understanding of discharge instructions and all questions answered.  Patient discharged from [facility] in stable condition.    Tammie Arvizu RN    Administrations This Visit     0.9% sodium chloride BOLUS     Admin Date  08/20/2019 Action  New Bag Dose  1000 mL Route  Intravenous Administered By  Tammie Arvizu, RN                /87   Pulse 89   Temp 98.2  F (36.8  C) (Oral)   SpO2 99%

## 2019-08-21 ENCOUNTER — PRE VISIT (OUTPATIENT)
Dept: SURGERY | Facility: CLINIC | Age: 29
End: 2019-08-21

## 2019-08-21 ENCOUNTER — INFUSION THERAPY VISIT (OUTPATIENT)
Dept: INFUSION THERAPY | Facility: CLINIC | Age: 29
End: 2019-08-21
Attending: INTERNAL MEDICINE
Payer: MEDICARE

## 2019-08-21 ENCOUNTER — OFFICE VISIT (OUTPATIENT)
Dept: SURGERY | Facility: CLINIC | Age: 29
End: 2019-08-21
Payer: MEDICARE

## 2019-08-21 ENCOUNTER — TELEPHONE (OUTPATIENT)
Dept: TRANSPLANT | Facility: CLINIC | Age: 29
End: 2019-08-21

## 2019-08-21 ENCOUNTER — HOME CARE/HOSPICE - HEALTHEAST (OUTPATIENT)
Dept: HOME HEALTH SERVICES | Facility: HOME HEALTH | Age: 29
End: 2019-08-21

## 2019-08-21 VITALS
SYSTOLIC BLOOD PRESSURE: 136 MMHG | DIASTOLIC BLOOD PRESSURE: 87 MMHG | TEMPERATURE: 97.8 F | OXYGEN SATURATION: 99 % | HEART RATE: 98 BPM

## 2019-08-21 VITALS
OXYGEN SATURATION: 100 % | HEART RATE: 99 BPM | SYSTOLIC BLOOD PRESSURE: 135 MMHG | BODY MASS INDEX: 23.2 KG/M2 | DIASTOLIC BLOOD PRESSURE: 90 MMHG | HEIGHT: 60 IN

## 2019-08-21 DIAGNOSIS — Z79.899 LONG TERM USE OF DRUG: ICD-10-CM

## 2019-08-21 DIAGNOSIS — Z48.298 AFTERCARE FOLLOWING ORGAN TRANSPLANT: ICD-10-CM

## 2019-08-21 DIAGNOSIS — Z48.298 AFTERCARE FOLLOWING ORGAN TRANSPLANT: Primary | ICD-10-CM

## 2019-08-21 DIAGNOSIS — K62.89 PERIANAL PAIN: Primary | ICD-10-CM

## 2019-08-21 DIAGNOSIS — Z94.0 KIDNEY REPLACED BY TRANSPLANT: ICD-10-CM

## 2019-08-21 DIAGNOSIS — E03.8 OTHER SPECIFIED HYPOTHYROIDISM: ICD-10-CM

## 2019-08-21 DIAGNOSIS — Z94.0 KIDNEY REPLACED BY TRANSPLANT: Primary | ICD-10-CM

## 2019-08-21 LAB
T3 SERPL-MCNC: 97 NG/DL (ref 60–181)
T4 FREE SERPL-MCNC: 1.05 NG/DL (ref 0.76–1.46)
TSH SERPL DL<=0.005 MIU/L-ACNC: 2.57 MU/L (ref 0.4–4)

## 2019-08-21 PROCEDURE — 84480 ASSAY TRIIODOTHYRONINE (T3): CPT | Performed by: INTERNAL MEDICINE

## 2019-08-21 PROCEDURE — 25000128 H RX IP 250 OP 636: Mod: ZF | Performed by: INTERNAL MEDICINE

## 2019-08-21 PROCEDURE — 96360 HYDRATION IV INFUSION INIT: CPT

## 2019-08-21 RX ORDER — TACROLIMUS 5 MG/1
10 CAPSULE ORAL 2 TIMES DAILY
Qty: 120 CAPSULE | Refills: 11 | Status: SHIPPED | OUTPATIENT
Start: 2019-08-21 | End: 2019-08-29

## 2019-08-21 RX ADMIN — SODIUM CHLORIDE 1000 ML: 9 INJECTION, SOLUTION INTRAVENOUS at 15:11

## 2019-08-21 ASSESSMENT — ENCOUNTER SYMPTOMS
HEADACHES: 1
BLOOD IN STOOL: 0
NERVOUS/ANXIOUS: 1
HYPOTENSION: 1
DIZZINESS: 0
LOSS OF CONSCIOUSNESS: 0
LIGHT-HEADEDNESS: 1
PALPITATIONS: 1
DYSURIA: 1
EXERCISE INTOLERANCE: 1
NUMBNESS: 0
HEMATURIA: 0
NAUSEA: 1
SLEEP DISTURBANCES DUE TO BREATHING: 0
DECREASED CONCENTRATION: 0
HYPERTENSION: 0
WEAKNESS: 1
DIARRHEA: 1
DEPRESSION: 0
SEIZURES: 0
FLANK PAIN: 0
TREMORS: 1
SYNCOPE: 0
SPEECH CHANGE: 0
MEMORY LOSS: 0
LEG PAIN: 0
ORTHOPNEA: 0
ABDOMINAL PAIN: 1
DISTURBANCES IN COORDINATION: 0
TINGLING: 0
VOMITING: 0
HEARTBURN: 0
INSOMNIA: 1
PANIC: 1
PARALYSIS: 0
CONSTIPATION: 1
BLOATING: 1

## 2019-08-21 ASSESSMENT — PAIN SCALES - GENERAL
PAINLEVEL: MILD PAIN (3)
PAINLEVEL: EXTREME PAIN (8)

## 2019-08-21 NOTE — TELEPHONE ENCOUNTER
ISSUE:   Tacrolimus level 7.6 on 8/19, goal 8-10, dose 9 mg BID    PLAN:   Please call pt and confirm this was a good 12-hour trough. Verify dose 9 mg BID. Confirm no new medications or illness (sugar. Diarrhea). If good trough, increase dose to 10 mg BID and recheck level as scheduled.

## 2019-08-21 NOTE — TELEPHONE ENCOUNTER
BPs similar to yesterday.  Weight continues at 116lbs.   Mona reports that she does have and minor headache.  She does endorse taking tylenol 650mg every 4 hours, but this is not completely eliminating her headache.  Lightheaded up on standing.    Mona reports drinking about 7 bottles (3.5L) of water daily.  Minor nausea.  Diarrhea is persistent, but metamucil has improved the bulk.   Mona reports that she is eating 3-4 small meals daily.  Appetite is improving daily.     Mona feels as if she needs IV hydration again.   RNCC agreed to give 1L NS and discussed eating a high salt diet. Will discuss florinef with MD.

## 2019-08-21 NOTE — PROGRESS NOTES
Nursing Note  Mona Wagner presents [facility] for:   Chief Complaint   Patient presents with     Infusion     IV Fluids     During [facility] appointment, orders from Dr. Wilson were completed.  Frequency: as needed    Progress note:  Patient identification verified by name and date of birth.  Assessment completed.  Vitals recorded in Doc Flowsheets.  Patient was provided with education regarding infusion and possible side effects.  Patient verbalized understanding.     present during visit today: Not Applicable.    Treatment Conditions: non-applicable.    Premedications: were not ordered.    Drug Waste Record: No    Infusion length and rate:  infusion given over approximately 1 hours    Labs: were not ordered for this appointment. Patient having her transplant labs drawn tomorrow morning    Vascular access: peripheral IV placed today.    Post Infusion Assessment:  Patient tolerated infusion without incident.     Discharge Plan:   Follow up plan of care with: transplant coordinator.  Discharge instructions were reviewed with patient.  Patient/representative verbalized understanding of discharge instructions and all questions answered.  Patient discharged [facility] in stable condition.    Ericka Severson, RN    Administrations This Visit     0.9% sodium chloride BOLUS     Admin Date  08/21/2019 Action  New Bag Dose  1000 mL Route  Intravenous Administered By  Severson, Ericka, RN                BP (!) 141/95 (Patient Position: Standing)   Pulse 109   Temp 97.8  F (36.6  C) (Oral)   SpO2 99%

## 2019-08-21 NOTE — LETTER
2019      RE: Mona Wagner  [address]  [place] [address]       Colon and Rectal Surgery Consult Clinic Note    Date: 2019     Referring provider:  No referring provider defined for this encounter.     RE: Mona Wagner  : 1990  BIRD: 2019    Mona Wagner is a very pleasant 28 year old female with kidney transplant 8/3/19 with a recent diagnosis of rectal pain.  Given these findings they were subsequently sent to the [facility] for an opinion on this and a new patient consultation.     Mona became constipated after her transplant with narcotic pain medications. She had a bowel movement after a week which was very painful. She now has a lot of pain with every bowel movement. Pain is right at the anal opening and only with passing stool. A small smear of bright red blood with wiping occasionally. She is now having diarrhea with negative c.diff. Her transplant team is working on switching her medications for the diarrhea. She denies any similar symptoms in the past. She denies any fevers, chills, or purulent drainage.    Physical Examination:  BP (!) 135/90 (BP Location: Right arm, Patient Position: Sitting, Cuff Size: Adult Regular)   Pulse 99   Ht 5'   SpO2 100%   BMI 23.20 kg/m     General: alert, oriented, in no acute distress, sitting comfortably  HEENT: mucous membranes moist    Perianal external examination: Exam was chaperoned by CAL Clemons   Perianal skin: Excoriation of the perianal skin and at the anal verge  Lesions: No evidence of an external lesion, nodularity, or induration in the perianal region.  Eversion of buttocks: There was not evidence of an anal fissure. Details: N/A.  Skin tags or external hemorrhoids: None.  Digital rectal examination: Was deferred    Anoscopy: Was deferred    Assessment/Plan: 28 year old female s/p kidney transplant 8/3/19 with perianal pain. I think pain is from excoriation of perianal skin from constipation  and now diarrhea. She got immediate improvement with topical Calmoseptine. Will have her continue to use this to help heal and protect her skin. Keep working with transplant team for diarrhea and she can try starting a daily fiber supplement to bulk up stools as well. Asked her to contact the clinic if her symptoms are worsening at all or not improved in the next few weeks. Patient's questions were answered to her stated satisfaction and she is in agreement with this plan.    Medical history:  Past Medical History:   Diagnosis Date     Anemia in chronic kidney disease      Dialysis patient (H)      End stage renal disease on dialysis (H)      Hypertension      Hypothyroid      Kidney transplanted 2019    DCD DDKT. Induction with thymo 6mg/kg.     Pituitary adenoma (H)        Surgical history:  Past Surgical History:   Procedure Laterality Date     BENCH KIDNEY N/A 8/3/2019    Procedure: BACKBENCH PREPARATION, ALLOGRAFT, KIDNEY;  Surgeon: Dorian Johnson MD;  Location: [facility] OR     CREATE FISTULA ARTERIOVENOUS UPPER EXTREMITY  2014    Procedure: CREATE FISTULA ARTERIOVENOUS UPPER EXTREMITY;  Left Wrist Arteriovenous Fistula Placement;  Surgeon: Shashi Castro MD;  Location: [facility] OR     RASTA/DIALYSIS CATHETER  12/10/2013          TRANSPLANT KIDNEY RECIPIENT  DONOR N/A 8/3/2019    Procedure: TRANSPLANT, KIDNEY, RECIPIENT,  DONOR with Ureteral Stent Placement;  Surgeon: Dorian Johnson MD;  Location: [facility] OR       Problem list:    Patient Active Problem List    Diagnosis Date Noted     Acute kidney failure, unspecified (H) 08/15/2019     Priority: Medium     Secondary renal hyperparathyroidism (H) 2019     Priority: Medium     Vitamin D deficiency 2019     Priority: Medium     Kidney replaced by transplant 2019     Priority: Medium     Anxiety 2019     Priority: Medium     Methemoglobinemia 2019     Priority: Medium     Dapsone       Immunosuppression (H)  08/08/2019     Priority: Medium     Delayed graft function of kidney 08/08/2019     Priority: Medium     Aftercare following organ transplant 08/03/2019     Priority: Medium     Infective endocarditis 01/24/2019     Priority: Medium     Anemia in chronic kidney disease      Priority: Medium     Acquired hypothyroidism 12/15/2015     Priority: Medium     Galactorrhea 11/11/2014     Priority: Medium     Seen by Endocrine. MRI demonstrated 6.7 x 5.3 mm hyperintense microadenoma in pituitary gland. Initiated dopamine agonist-cabergoline. Replete prolactin when thyroid level is normal.        Other general symptoms(780.99) 11/05/2014     Priority: Medium     DVT of upper extremity (deep vein thrombosis) (H) 09/30/2014     Priority: Medium     Took five of six months of warfarin therapy. Had SAH on therapy and thus decision made to discontinue.        Seizure disorder (H) 09/30/2014     Priority: Medium     HTN, kidney transplant related 09/30/2014     Priority: Medium     Increased prolactin level (H) 09/17/2014     Priority: Medium     Normocytic anemia 09/17/2014     Priority: Medium     Thrombocytopenia (H) 09/17/2014     Priority: Medium       Medications:  Current Outpatient Medications   Medication Sig Dispense Refill     acetaminophen (TYLENOL) 325 MG tablet Take 2 tablets (650 mg) by mouth every 4 hours as needed for mild pain 100 tablet 3     amLODIPine (NORVASC) 10 MG tablet Take 0.5 tablets (5 mg) by mouth daily 30 tablet 5     aspirin (ASA) 81 MG chewable tablet Take 1 tablet (81 mg) by mouth daily 30 tablet 5     atorvastatin (LIPITOR) 10 MG tablet Take 1 tablet (10 mg) by mouth daily 30 tablet 11     bumetanide (BUMEX) 2 MG tablet Take 2 tablets (4 mg) by mouth 2 times daily for 5 days 20 tablet 0     diphenhydrAMINE (BENADRYL) 25 MG tablet Take 1-2 tablets (25-50 mg) by mouth every 6 hours as needed for itching or allergies 60 tablet 1     docusate sodium (COLACE) 100 MG tablet Take 1 tablet (100 mg) by  mouth daily as needed for constipation       EPINEPHrine (EPIPEN/ADRENACLICK/OR ANY BX GENERIC EQUIV) 0.3 MG/0.3ML injection 2-pack Inject 0.3 mLs (0.3 mg) into the muscle as needed for anaphylaxis 0.6 mL 3     hydrOXYzine (ATARAX) 10 MG tablet Take 1 tablet (10 mg) by mouth 3 times daily as needed for anxiety 20 tablet 0     Inulin (METAMUCIL CLEAR & NATURAL PO)        levothyroxine (SYNTHROID/LEVOTHROID) 25 MCG tablet Take 1 tablet (25 mcg) Tuesday, Thursday, Saturday and Sunday and 1.5 tablets (37.5 mcg) on Monday, Wednesday and Friday every week. 105 tablet 1     multivitamin RENAL (NEPHROCAPS/TRIPHROCAPS) 1 MG capsule Take 1 capsule by mouth daily 30 capsule 3     MYFORTIC (BRAND) 180 MG EC tablet Take 3 tablets (540 mg) by mouth 2 times daily 180 tablet 11     norethindrone (MICRONOR) 0.35 MG tablet Do not restart until your follow up appointment with your surgeon. Discuss restart date at that appointment. (Patient not taking: Reported on 8/18/2019)       nystatin (MYCOSTATIN) 084838 UNIT/ML suspension Take 5 mLs (500,000 Units) by mouth 4 times daily for 14 days 280 mL 0     ondansetron (ZOFRAN ODT) 4 MG ODT tab Take 1-2 tablets (4-8 mg) by mouth every 8 hours as needed for nausea 60 tablet 1     pentamidine (NEBUPENT) 300 MG neb solution Inhale 300 mg into the lungs every 28 days       phenylephrine-cocoa butter (PREPARATION H) 0.25-88.44 % suppository Place 1 suppository rectally 2 times daily as needed for hemorrhoids or itching 14 suppository 0     phenylephrine-shark liver oil-mineral oil-petrolatum (PREPARATION H) 0.25-3-14-71.9 % rectal ointment Place rectally 2 times daily as needed for hemorrhoids 28.4 g 1     polyethylene glycol (MIRALAX/GLYCOLAX) powder Take 17 g (1 capful) by mouth daily as needed for constipation 500 g 3     sevelamer (RENVELA) 800 MG tablet Take 1600 mg by mouth three times daily with meals AND Take 800 mg by mouth with snacks.        simethicone (MYLICON) 125 MG chewable tablet  Take 1 tablet (125 mg) by mouth 4 times daily as needed for intestinal gas       tacrolimus (GENERIC EQUIVALENT) 0.5 MG capsule Take 1 cap by mouth twice daily as directed by Transplant Center for dose adjustment 30 capsule 0     tacrolimus (GENERIC EQUIVALENT) 1 MG capsule Take 7 mg by mouth 2 times daily Take 9mg daily 300 capsule 11     valGANciclovir (VALCYTE) 450 MG tablet Take 1 tab every Monday and Thursday. Titrate dose up to a max of 2 tabs (900 mg) by mouth daily when directed by your transplant team. 60 tablet 2     vitamin D3 (CHOLECALCIFEROL) 2000 units (50 mcg) tablet Take 1 tablet (2,000 Units) by mouth daily 90 tablet 3       Allergies:  Allergies   Allergen Reactions     Chlorhexidine Rash     Rash at site     Sulfa Drugs Rash     Muscle stiffness of neck     Dapsone      Methemoglobinemia     Furosemide Other (See Comments)     Skin flushing     Lisinopril Swelling     angioedema     Alcohol Swabs [Isopropyl Alcohol] Rash       Family history:  Family History   Problem Relation Age of Onset     Hypertension Sister      Diabetes Sister      Cerebrovascular Disease Sister      Kidney Disease Mother      Kidney Disease Sister      Cerebrovascular Disease Father      Coronary Artery Disease No family hx of      Breast Cancer No family hx of      Cancer - colorectal No family hx of      Ovarian Cancer No family hx of      Prostate Cancer No family hx of      Other Cancer No family hx of      Asthma No family hx of        Social history:  Social History     Tobacco Use     Smoking status: Never Smoker     Smokeless tobacco: Never Used   Substance Use Topics     Alcohol use: No     Alcohol/week: 0.0 oz    Marital status: .    Nursing Notes:   Scout Galeana, EMT  8/21/2019 10:58 AM  Signed  Chief Complaint   Patient presents with     Rectal Problem     Rectal pain.       Vitals:    08/21/19 1055   BP: (!) 135/90   BP Location: Right arm   Patient Position: Sitting   Cuff Size: Adult Regular    Pulse: 99   SpO2: 100%   Height: 5'       Body mass index is 23.2 kg/m .      CAL Clemons                         Total face to face time was 20 minutes, >50% counseling.    ALCIDES Mcduffie, NP-C  Colon and Rectal Surgery   [facility]    This note was created using speech recognition software and may contain unintended word substitutions.      ALCIDES Mcduffie CNP

## 2019-08-21 NOTE — PROGRESS NOTES
Colon and Rectal Surgery Consult Clinic Note    Date: 2019     Referring provider:  No referring provider defined for this encounter.     RE: Mona Wagner  : 1990  BIRD: 2019    Mona Wagner is a very pleasant 28 year old female with kidney transplant 8/3/19 with a recent diagnosis of rectal pain.  Given these findings they were subsequently sent to the Colon and Rectal Surgery Clinic for an opinion on this and a new patient consultation.     Mona became constipated after her transplant with narcotic pain medications. She had a bowel movement after a week which was very painful. She now has a lot of pain with every bowel movement. Pain is right at the anal opening and only with passing stool. A small smear of bright red blood with wiping occasionally. She is now having diarrhea with negative c.diff. Her transplant team is working on switching her medications for the diarrhea. She denies any similar symptoms in the past. She denies any fevers, chills, or purulent drainage.    Physical Examination:  BP (!) 135/90 (BP Location: Right arm, Patient Position: Sitting, Cuff Size: Adult Regular)   Pulse 99   Ht 5'   SpO2 100%   BMI 23.20 kg/m    General: alert, oriented, in no acute distress, sitting comfortably  HEENT: mucous membranes moist    Perianal external examination: Exam was chaperoned by CAL Clemons   Perianal skin: Excoriation of the perianal skin and at the anal verge  Lesions: No evidence of an external lesion, nodularity, or induration in the perianal region.  Eversion of buttocks: There was not evidence of an anal fissure. Details: N/A.  Skin tags or external hemorrhoids: None.  Digital rectal examination: Was deferred    Anoscopy: Was deferred    Assessment/Plan: 28 year old female s/p kidney transplant 8/3/19 with perianal pain. I think pain is from excoriation of perianal skin from constipation and now diarrhea. She got immediate improvement with topical Calmoseptine. Will  have her continue to use this to help heal and protect her skin. Keep working with transplant team for diarrhea and she can try starting a daily fiber supplement to bulk up stools as well. Asked her to contact the clinic if her symptoms are worsening at all or not improved in the next few weeks. Patient's questions were answered to her stated satisfaction and she is in agreement with this plan.    Medical history:  Past Medical History:   Diagnosis Date     Anemia in chronic kidney disease      Dialysis patient (H)      End stage renal disease on dialysis (H)      Hypertension      Hypothyroid      Kidney transplanted 2019    DCD DDKT. Induction with thymo 6mg/kg.     Pituitary adenoma (H)        Surgical history:  Past Surgical History:   Procedure Laterality Date     BENCH KIDNEY N/A 8/3/2019    Procedure: BACKBENCH PREPARATION, ALLOGRAFT, KIDNEY;  Surgeon: Dorian Johnson MD;  Location: [facility] OR     CREATE FISTULA ARTERIOVENOUS UPPER EXTREMITY  2014    Procedure: CREATE FISTULA ARTERIOVENOUS UPPER EXTREMITY;  Left Wrist Arteriovenous Fistula Placement;  Surgeon: Shashi Castro MD;  Location: [facility] OR     RASTA/DIALYSIS CATHETER  12/10/2013          TRANSPLANT KIDNEY RECIPIENT  DONOR N/A 8/3/2019    Procedure: TRANSPLANT, KIDNEY, RECIPIENT,  DONOR with Ureteral Stent Placement;  Surgeon: Dorian Johnson MD;  Location: [facility] OR       Problem list:    Patient Active Problem List    Diagnosis Date Noted     Acute kidney failure, unspecified (H) 08/15/2019     Priority: Medium     Secondary renal hyperparathyroidism (H) 2019     Priority: Medium     Vitamin D deficiency 2019     Priority: Medium     Kidney replaced by transplant 2019     Priority: Medium     Anxiety 2019     Priority: Medium     Methemoglobinemia 2019     Priority: Medium     Dapsone       Immunosuppression (H) 2019     Priority: Medium     Delayed graft function of kidney 2019      Priority: Medium     Aftercare following organ transplant 08/03/2019     Priority: Medium     Infective endocarditis 01/24/2019     Priority: Medium     Anemia in chronic kidney disease      Priority: Medium     Acquired hypothyroidism 12/15/2015     Priority: Medium     Galactorrhea 11/11/2014     Priority: Medium     Seen by Endocrine. MRI demonstrated 6.7 x 5.3 mm hyperintense microadenoma in pituitary gland. Initiated dopamine agonist-cabergoline. Replete prolactin when thyroid level is normal.        Other general symptoms(780.99) 11/05/2014     Priority: Medium     DVT of upper extremity (deep vein thrombosis) (H) 09/30/2014     Priority: Medium     Took five of six months of warfarin therapy. Had SAH on therapy and thus decision made to discontinue.        Seizure disorder (H) 09/30/2014     Priority: Medium     HTN, kidney transplant related 09/30/2014     Priority: Medium     Increased prolactin level (H) 09/17/2014     Priority: Medium     Normocytic anemia 09/17/2014     Priority: Medium     Thrombocytopenia (H) 09/17/2014     Priority: Medium       Medications:  Current Outpatient Medications   Medication Sig Dispense Refill     acetaminophen (TYLENOL) 325 MG tablet Take 2 tablets (650 mg) by mouth every 4 hours as needed for mild pain 100 tablet 3     amLODIPine (NORVASC) 10 MG tablet Take 0.5 tablets (5 mg) by mouth daily 30 tablet 5     aspirin (ASA) 81 MG chewable tablet Take 1 tablet (81 mg) by mouth daily 30 tablet 5     atorvastatin (LIPITOR) 10 MG tablet Take 1 tablet (10 mg) by mouth daily 30 tablet 11     bumetanide (BUMEX) 2 MG tablet Take 2 tablets (4 mg) by mouth 2 times daily for 5 days 20 tablet 0     diphenhydrAMINE (BENADRYL) 25 MG tablet Take 1-2 tablets (25-50 mg) by mouth every 6 hours as needed for itching or allergies 60 tablet 1     docusate sodium (COLACE) 100 MG tablet Take 1 tablet (100 mg) by mouth daily as needed for constipation       EPINEPHrine (EPIPEN/ADRENACLICK/OR ANY BX  GENERIC EQUIV) 0.3 MG/0.3ML injection 2-pack Inject 0.3 mLs (0.3 mg) into the muscle as needed for anaphylaxis 0.6 mL 3     hydrOXYzine (ATARAX) 10 MG tablet Take 1 tablet (10 mg) by mouth 3 times daily as needed for anxiety 20 tablet 0     Inulin (METAMUCIL CLEAR & NATURAL PO)        levothyroxine (SYNTHROID/LEVOTHROID) 25 MCG tablet Take 1 tablet (25 mcg) Tuesday, Thursday, Saturday and Sunday and 1.5 tablets (37.5 mcg) on Monday, Wednesday and Friday every week. 105 tablet 1     multivitamin RENAL (NEPHROCAPS/TRIPHROCAPS) 1 MG capsule Take 1 capsule by mouth daily 30 capsule 3     MYFORTIC (BRAND) 180 MG EC tablet Take 3 tablets (540 mg) by mouth 2 times daily 180 tablet 11     norethindrone (MICRONOR) 0.35 MG tablet Do not restart until your follow up appointment with your surgeon. Discuss restart date at that appointment. (Patient not taking: Reported on 8/18/2019)       nystatin (MYCOSTATIN) 537540 UNIT/ML suspension Take 5 mLs (500,000 Units) by mouth 4 times daily for 14 days 280 mL 0     ondansetron (ZOFRAN ODT) 4 MG ODT tab Take 1-2 tablets (4-8 mg) by mouth every 8 hours as needed for nausea 60 tablet 1     pentamidine (NEBUPENT) 300 MG neb solution Inhale 300 mg into the lungs every 28 days       phenylephrine-cocoa butter (PREPARATION H) 0.25-88.44 % suppository Place 1 suppository rectally 2 times daily as needed for hemorrhoids or itching 14 suppository 0     phenylephrine-shark liver oil-mineral oil-petrolatum (PREPARATION H) 0.25-3-14-71.9 % rectal ointment Place rectally 2 times daily as needed for hemorrhoids 28.4 g 1     polyethylene glycol (MIRALAX/GLYCOLAX) powder Take 17 g (1 capful) by mouth daily as needed for constipation 500 g 3     sevelamer (RENVELA) 800 MG tablet Take 1600 mg by mouth three times daily with meals AND Take 800 mg by mouth with snacks.        simethicone (MYLICON) 125 MG chewable tablet Take 1 tablet (125 mg) by mouth 4 times daily as needed for intestinal gas        tacrolimus (GENERIC EQUIVALENT) 0.5 MG capsule Take 1 cap by mouth twice daily as directed by Transplant Center for dose adjustment 30 capsule 0     tacrolimus (GENERIC EQUIVALENT) 1 MG capsule Take 7 mg by mouth 2 times daily Take 9mg daily 300 capsule 11     valGANciclovir (VALCYTE) 450 MG tablet Take 1 tab every Monday and Thursday. Titrate dose up to a max of 2 tabs (900 mg) by mouth daily when directed by your transplant team. 60 tablet 2     vitamin D3 (CHOLECALCIFEROL) 2000 units (50 mcg) tablet Take 1 tablet (2,000 Units) by mouth daily 90 tablet 3       Allergies:  Allergies   Allergen Reactions     Chlorhexidine Rash     Rash at site     Sulfa Drugs Rash     Muscle stiffness of neck     Dapsone      Methemoglobinemia     Furosemide Other (See Comments)     Skin flushing     Lisinopril Swelling     angioedema     Alcohol Swabs [Isopropyl Alcohol] Rash       Family history:  Family History   Problem Relation Age of Onset     Hypertension Sister      Diabetes Sister      Cerebrovascular Disease Sister      Kidney Disease Mother      Kidney Disease Sister      Cerebrovascular Disease Father      Coronary Artery Disease No family hx of      Breast Cancer No family hx of      Cancer - colorectal No family hx of      Ovarian Cancer No family hx of      Prostate Cancer No family hx of      Other Cancer No family hx of      Asthma No family hx of        Social history:  Social History     Tobacco Use     Smoking status: Never Smoker     Smokeless tobacco: Never Used   Substance Use Topics     Alcohol use: No     Alcohol/week: 0.0 oz    Marital status: .    Nursing Notes:   Scout Galeana, EMT  8/21/2019 10:58 AM  Signed  Chief Complaint   Patient presents with     Rectal Problem     Rectal pain.       Vitals:    08/21/19 1055   BP: (!) 135/90   BP Location: Right arm   Patient Position: Sitting   Cuff Size: Adult Regular   Pulse: 99   SpO2: 100%   Height: 5'       Body mass index is 23.2  kg/m .      CAL Clemons                         Total face to face time was 20 minutes, >50% counseling.    ALCIDES Mcduffie, NP-C  Colon and Rectal Surgery   [facility]    This note was created using speech recognition software and may contain unintended word substitutions.

## 2019-08-21 NOTE — TELEPHONE ENCOUNTER
Spoke to patient who confirms current Tacrolimus dose and good 12 hour trough level.  Patient verbalizes understanding to increase Tacrolimus dose to 10 mg BID.

## 2019-08-21 NOTE — NURSING NOTE
Chief Complaint   Patient presents with     Rectal Problem     Rectal pain.       Vitals:    08/21/19 1055   BP: (!) 135/90   BP Location: Right arm   Patient Position: Sitting   Cuff Size: Adult Regular   Pulse: 99   SpO2: 100%   Height: 5'       Body mass index is 23.2 kg/m .      Scout Galeana, EMT

## 2019-08-21 NOTE — TELEPHONE ENCOUNTER
Post Kidney and Pancreas Transplant Team Conference  Date: 8/21/2019  Transplant Coordinator: Laurence Vazquez     Attendees:  []  Dr. Hill  [x] Leonela Brewster LPN     [x]  Dr. Wilson [x] Cinthia Cox, ERMELINDA [] Kiersten Chang LPN   []  Dr. Rios [] Gely Lynn, RN     [] Keeley Li RN [x] Anthony Hendricks, RhinaD   [] Dr. Johnson [x] Melani Vazquez RN    [x] Dr. Durbin [] Virgilio Ryan RN    [] Dr. Pinto [] Maria Teresa Mayberry RN    [x] Dr. Delgadillo [] Yesika Wong RN     [] Melanie Ayala, ERMELINDA    [] Surgery Fellow [] Nancy Maldonado RN    [] Johana Linda NP [] Nathalia Moreno RN        Verbal Plan Read Back:   No changes at this time    Routed to RN Coordinator   Leonela Brewster LPN

## 2019-08-21 NOTE — TELEPHONE ENCOUNTER
RECORDS RECEIVED FROM: Internal    DATE RECEIVED: 8/21/19   NOTES STATUS DETAILS   OFFICE NOTE from referring provider  Internal Referral tab 8/19/19   OFFICE NOTE from other specialist   N/A    DISCHARGE SUMMARY from hospital  N/A    DISCHARGE REPORT from the ER N/A    OPERATIVE REPORT  N/A    MEDICATION LIST N/A    LABS     PFC TESTING N/A    ANAL PAP N/A    BIOPSIES/PATHOLOGY RELATED TO DIAGNOSIS N/A    DIAGNOSTIC PROCEDURES     COLONOSCOPY N/A    UPPER ENDOSCOPY (EGD) N/A    FLEX SIGMOIDOSCOPY  N/A    ERCP N/A    IMAGING (DISC & REPORT)      CT  N/A    MRI N/A    XRAY N/A    ULTRASOUND (ENDOANAL/ENDORECTAL) N/A

## 2019-08-21 NOTE — PATIENT INSTRUCTIONS
1. Start a daily fiber supplement such as Citrucel or Metamucil. Start with once a day and slowly increase up to three times a day, if needed, over the next 4-6 weeks    2. Calmoseptine cream or Desitin to perianal skin     3. Contact the clinic if symptoms are worsening at any time or if not improved in the next few weeks.

## 2019-08-22 ENCOUNTER — TELEPHONE (OUTPATIENT)
Dept: TRANSPLANT | Facility: CLINIC | Age: 29
End: 2019-08-22

## 2019-08-22 ENCOUNTER — RECORDS - HEALTHEAST (OUTPATIENT)
Dept: LAB | Facility: CLINIC | Age: 29
End: 2019-08-22

## 2019-08-22 ENCOUNTER — HOME CARE/HOSPICE - HEALTHEAST (OUTPATIENT)
Dept: HOME HEALTH SERVICES | Facility: HOME HEALTH | Age: 29
End: 2019-08-22

## 2019-08-22 DIAGNOSIS — D64.9 ANEMIA: Primary | ICD-10-CM

## 2019-08-22 DIAGNOSIS — E87.20 METABOLIC ACIDOSIS: Primary | ICD-10-CM

## 2019-08-22 DIAGNOSIS — Z94.0 KIDNEY TRANSPLANTED: Primary | ICD-10-CM

## 2019-08-22 LAB
ANION GAP SERPL CALCULATED.3IONS-SCNC: 6 MMOL/L (ref 5–18)
BUN SERPL-MCNC: 27 MG/DL (ref 8–22)
CALCIUM SERPL-MCNC: 10.8 MG/DL (ref 8.5–10.5)
CHLORIDE BLD-SCNC: 118 MMOL/L (ref 98–107)
CO2 SERPL-SCNC: 17 MMOL/L (ref 22–31)
CREAT SERPL-MCNC: 2.36 MG/DL (ref 0.6–1.1)
ERYTHROCYTE [DISTWIDTH] IN BLOOD BY AUTOMATED COUNT: 13.6 % (ref 11–14.5)
GFR SERPL CREATININE-BSD FRML MDRD: 25 ML/MIN/1.73M2
GLUCOSE BLD-MCNC: 103 MG/DL (ref 70–125)
HCT VFR BLD AUTO: 22.3 % (ref 35–47)
HGB BLD-MCNC: 7 G/DL (ref 12–16)
MCH RBC QN AUTO: 31 PG (ref 27–34)
MCHC RBC AUTO-ENTMCNC: 31.4 G/DL (ref 32–36)
MCV RBC AUTO: 99 FL (ref 80–100)
PLATELET # BLD AUTO: 310 THOU/UL (ref 140–440)
PMV BLD AUTO: 8.3 FL (ref 8.5–12.5)
POTASSIUM BLD-SCNC: 5.2 MMOL/L (ref 3.5–5)
RBC # BLD AUTO: 2.26 MILL/UL (ref 3.8–5.4)
SODIUM SERPL-SCNC: 141 MMOL/L (ref 136–145)
TACROLIMUS BLD-MCNC: 6.7 UG/L (ref 5–15)
TACROLIMUS LAST DOSE: NORMAL
WBC: 5.6 THOU/UL (ref 4–11)

## 2019-08-22 RX ORDER — SODIUM BICARBONATE 650 MG/1
650 TABLET ORAL 2 TIMES DAILY
Qty: 60 TABLET | Refills: 3 | Status: SHIPPED | OUTPATIENT
Start: 2019-08-22 | End: 2019-08-27

## 2019-08-22 RX ORDER — CINACALCET 30 MG/1
30 TABLET, FILM COATED ORAL DAILY
Qty: 30 TABLET | Refills: 2 | Status: SHIPPED | OUTPATIENT
Start: 2019-08-22 | End: 2019-12-31

## 2019-08-22 NOTE — TELEPHONE ENCOUNTER
Nakul Hill MD Schindelholz, Alanna, RN             Improving kidney function, but low bicarbonate level.  Recommend starting sodium bicarbonate 650 mg twice daily.      Mona reports weight is stable at 117 this afternoon. Mona has been trying to drink more fluids.      BP seated 138 / 83, standing 129 / 82.  Denies any lightheadedness / dizziness.   , before transplant was 80s.     Diarrhea continues to improve as long as use of metamucil is consistent.     K is running slightly high. - Mona will continue to avoid high potassium foods.  Calcium level high - restarted on cinacalcet    Hgb low - will refer to anemia services. Mona does have ZEPEDA with minimal exertion.  RNCC advised to eat high iron diet.    Mona voiced understanding.

## 2019-08-22 NOTE — TELEPHONE ENCOUNTER
Spoke to patient who verbalizes understanding to start Sensipar 30 mg daily and understanding to notify txp team if nausea occurs.

## 2019-08-23 ENCOUNTER — HOME CARE/HOSPICE - HEALTHEAST (OUTPATIENT)
Dept: HOME HEALTH SERVICES | Facility: HOME HEALTH | Age: 29
End: 2019-08-23

## 2019-08-23 ENCOUNTER — TELEPHONE (OUTPATIENT)
Dept: TRANSPLANT | Facility: CLINIC | Age: 29
End: 2019-08-23

## 2019-08-23 ENCOUNTER — AMBULATORY - HEALTHEAST (OUTPATIENT)
Dept: OBGYN | Facility: CLINIC | Age: 29
End: 2019-08-23

## 2019-08-23 DIAGNOSIS — Z79.2 PROPHYLACTIC ANTIBIOTIC: ICD-10-CM

## 2019-08-23 DIAGNOSIS — Z94.0 KIDNEY REPLACED BY TRANSPLANT: Primary | ICD-10-CM

## 2019-08-23 RX ORDER — KETOCONAZOLE 200 MG/1
100 TABLET ORAL DAILY
Qty: 15 TABLET | Refills: 3 | Status: SHIPPED | OUTPATIENT
Start: 2019-08-23 | End: 2019-08-27

## 2019-08-23 NOTE — TELEPHONE ENCOUNTER
Tac level DROPPED despite INCREASE in tacro dose.  May be rapid metabolizer.    RNCC confirmed correct dosing and 12h trough levels.    Mona is having tremors and headaches.    BP is running 124 / 78 - 109.    Please dose an additional 2mg now.     Will discuss dosing with MD.

## 2019-08-23 NOTE — TELEPHONE ENCOUNTER
Post Kidney and Pancreas Transplant Team Conference  Date: 8/23/2019  Transplant Coordinator: Laurence Vazquez     Attendees:  [x]  Dr. Hill  [] Leonela Brewster LPN     []  Dr. Wilson [] Cinthia Cox RN [] Kiersten Chang LPN   []  Dr. Rios [] Gely Lynn RN     [] Keeley Li RN [] Anthony Hendricks, PharmD   [] Dr. Johnson [x] Melani Vazquez RN    [] Dr. Durbin [] Virgilio Ryan RN    [] Dr. Pinto [] Maria Teresa Mayberry RN    [] Dr. Delgadillo [] Yesika Wong RN     [] Melanie Ayala RN    [] Surgery Fellow [] Nancy Maldonado RN    [] Johana Linda, NP [] Nathalia Moreno RN        Verbal Plan Read Back:   For low drug levels -   ketoconazole 100mg daily    Routed to RN Coordinator   Laurence Vazquez RN    Mona reports that she has not been taking Bumex for about 1 week.   Mona reports that she has stopped the amlodipine, has been off of this for about 1 week as well.     She voiced understanding to start ketoconazole.    RNCC counseled Mona to stop lipitor if myalgais and notify SOT.  Okay to stop nystatin.

## 2019-08-26 ENCOUNTER — TELEPHONE (OUTPATIENT)
Dept: TRANSPLANT | Facility: CLINIC | Age: 29
End: 2019-08-26

## 2019-08-26 ENCOUNTER — HOME CARE/HOSPICE - HEALTHEAST (OUTPATIENT)
Dept: HOME HEALTH SERVICES | Facility: HOME HEALTH | Age: 29
End: 2019-08-26

## 2019-08-26 ENCOUNTER — INFUSION THERAPY VISIT (OUTPATIENT)
Dept: INFUSION THERAPY | Facility: CLINIC | Age: 29
End: 2019-08-26
Attending: INTERNAL MEDICINE
Payer: MEDICARE

## 2019-08-26 VITALS — RESPIRATION RATE: 16 BRPM | SYSTOLIC BLOOD PRESSURE: 107 MMHG | DIASTOLIC BLOOD PRESSURE: 72 MMHG | TEMPERATURE: 98 F

## 2019-08-26 DIAGNOSIS — Z48.298 AFTERCARE FOLLOWING ORGAN TRANSPLANT: ICD-10-CM

## 2019-08-26 DIAGNOSIS — Z48.298 AFTERCARE FOLLOWING ORGAN TRANSPLANT: Primary | ICD-10-CM

## 2019-08-26 DIAGNOSIS — Z94.0 KIDNEY REPLACED BY TRANSPLANT: Primary | ICD-10-CM

## 2019-08-26 LAB
ALBUMIN UR-MCNC: NEGATIVE MG/DL
APPEARANCE UR: CLEAR
BACTERIA #/AREA URNS HPF: ABNORMAL /HPF
BILIRUB UR QL STRIP: NEGATIVE
COLOR UR AUTO: COLORLESS
GLUCOSE UR STRIP-MCNC: 50 MG/DL
HGB UR QL STRIP: ABNORMAL
KETONES UR STRIP-MCNC: NEGATIVE MG/DL
LEUKOCYTE ESTERASE UR QL STRIP: NEGATIVE
MUCOUS THREADS #/AREA URNS LPF: PRESENT /LPF
NITRATE UR QL: NEGATIVE
PH UR STRIP: 5 PH (ref 5–7)
RBC #/AREA URNS AUTO: 1 /HPF (ref 0–2)
SOURCE: ABNORMAL
SP GR UR STRIP: 1 (ref 1–1.03)
SQUAMOUS #/AREA URNS AUTO: <1 /HPF (ref 0–1)
UROBILINOGEN UR STRIP-MCNC: 0 MG/DL (ref 0–2)
WBC #/AREA URNS AUTO: 1 /HPF (ref 0–5)

## 2019-08-26 PROCEDURE — 81001 URINALYSIS AUTO W/SCOPE: CPT | Performed by: INTERNAL MEDICINE

## 2019-08-26 PROCEDURE — 25000128 H RX IP 250 OP 636: Mod: ZF | Performed by: INTERNAL MEDICINE

## 2019-08-26 PROCEDURE — 96360 HYDRATION IV INFUSION INIT: CPT

## 2019-08-26 RX ADMIN — SODIUM CHLORIDE 1000 ML: 9 INJECTION, SOLUTION INTRAVENOUS at 15:54

## 2019-08-26 NOTE — PROGRESS NOTES
"Nursing Note  Mona Wagner presents [facility] for:   Chief Complaint   Patient presents with     Infusion     1 liter NS     During today's [facility] appointment, orders from Dr. Hill were completed.  Frequency: as needed    Progress note:  Patient identification verified by name and date of birth.  Assessment completed.  Vitals recorded in Doc Flowsheets.  Patient was provided with education regarding infusion and possible side effects.  Patient verbalized understanding.     present during visit today: Not Applicable.    Patient reports feeling as though \"a rock is going to pass\" after she is finished urinating. She stated that she has had his feeling since last week. Dr. Hill notified and UA/UC sent and PVR done which resulted in: 27 ml remaining in bladder post void.     Premedications: were not ordered.    Drug Waste Record: No    Infusion length and rate:  infusion given over approximately 1 hours    Labs: were not ordered for this appointment.    Vascular access: peripheral IV placed today.    Post Infusion Assessment:  Patient tolerated infusion without incident.     Discharge Plan:   Follow up plan of care with: transplant coordinator & team.  Discharge instructions were reviewed with patient.  Patient/representative verbalized understanding of discharge instructions and all questions answered.  Patient discharged from [facility] in stable condition.    Alba Naylor RN    Administrations This Visit     0.9% sodium chloride BOLUS     Admin Date  08/26/2019 Action  New Bag Dose  1000 mL Route  Intravenous Administered By  Alba Naylor, ERMELINDA                /72 (BP Location: Right arm)   Temp 98  F (36.7  C) (Oral)   Resp 16       "

## 2019-08-26 NOTE — TELEPHONE ENCOUNTER
RNCC spoke with Mona -   102 / 70. HR about 91.  Some lightheadedness / dizziness.  +sweats  Denies any fever.  Drinking 2.5 - 3L daily.   Weight is down to 114 this AM, down from 117.    Will give 1L of NS.    Mona reports that ketoconazole is not covered by insurance - will discuss PA with local pharmacy.

## 2019-08-26 NOTE — TELEPHONE ENCOUNTER
"1:30 PM  August 24, 2019  Returned Mona's call.  Concerned about getting dehydrated.  \"The more I drink, the more I urinate.\"  I explained that was normal and to continue to drink.  Also concerned that she weighed 117# las night and dropped to 114# this morning.  Also explained that a small weight loss overnight was normal.  Will continue to monitor her weight and intake/output.  Daisy Zamudio, RN, BA  On Call Organ Coordinator    "

## 2019-08-26 NOTE — TELEPHONE ENCOUNTER
Home care nurse called while she was with patient and reports BP this AM = 101/70, 110/74.  Patient appears dehydrated and c/o diarrhea 4 X last night.  Patient denies any pain or fever, asking if she can come in for IV fluids.  Home care was able to draw some blood today, not enough for all labs.  Home care is able to return tomorrow for a PRN visit.  Patient would like call back regarding IV fluids.  Please advise.

## 2019-08-26 NOTE — TELEPHONE ENCOUNTER
Provider Call: General  Route to LPN    Reason for call: Call from N- unable to get labs today- pt not feeling well-weak shaky low BP  No temp-  Diarrhea last night  Wonders if needs to come here for labs or take her meds      Call back needed? Yes    Return Call Needed  Same as documented in contacts section  When to return call?: Same day: Route High Priority

## 2019-08-26 NOTE — TELEPHONE ENCOUNTER
Prior Authorization Specialty Medication Request    Medication/Dose:   ketoconazole (NIZORAL) 200 MG tablet Take 0.5 tablets (100 mg) by mouth daily     ICD code (if different than what is on RX):  Z94.0  Previously Tried and Failed:      Important Lab Values:   Rationale:     Insurance Name:   Insurance ID:   Insurance Phone Number:     Pharmacy Information (if different than what is on RX)  Name:    Phone:

## 2019-08-26 NOTE — TELEPHONE ENCOUNTER
Central Prior Authorization Team   Phone: [phone]    PA Initiation    Medication: Ketoconazole 200mg tab (1/2 tab = 100mg dose)  Insurance Company: [employer] - Phone [phone] Fax [phone]  Pharmacy Filling the Rx: [id number] - [place], [place] - [address] AT [facility] [address]  Filling Pharmacy Phone: [phone]  Filling Pharmacy Fax:    Start Date: 8/26/2019

## 2019-08-27 ENCOUNTER — HOME CARE/HOSPICE - HEALTHEAST (OUTPATIENT)
Dept: HOME HEALTH SERVICES | Facility: HOME HEALTH | Age: 29
End: 2019-08-27

## 2019-08-27 ENCOUNTER — RECORDS - HEALTHEAST (OUTPATIENT)
Dept: LAB | Facility: HOSPITAL | Age: 29
End: 2019-08-27

## 2019-08-27 ENCOUNTER — TELEPHONE (OUTPATIENT)
Dept: TRANSPLANT | Facility: CLINIC | Age: 29
End: 2019-08-27

## 2019-08-27 DIAGNOSIS — E83.39 HYPOPHOSPHATEMIA: Primary | ICD-10-CM

## 2019-08-27 DIAGNOSIS — Z94.0 KIDNEY REPLACED BY TRANSPLANT: ICD-10-CM

## 2019-08-27 DIAGNOSIS — E87.20 METABOLIC ACIDOSIS: ICD-10-CM

## 2019-08-27 LAB
ANION GAP SERPL CALCULATED.3IONS-SCNC: 6 MMOL/L (ref 5–18)
BUN SERPL-MCNC: 24 MG/DL (ref 8–22)
CALCIUM SERPL-MCNC: 10.4 MG/DL (ref 8.5–10.5)
CHLORIDE BLD-SCNC: 118 MMOL/L (ref 98–107)
CO2 SERPL-SCNC: 15 MMOL/L (ref 22–31)
CREAT SERPL-MCNC: 1.84 MG/DL (ref 0.6–1.1)
ERYTHROCYTE [DISTWIDTH] IN BLOOD BY AUTOMATED COUNT: 14.2 % (ref 11–14.5)
GFR SERPL CREATININE-BSD FRML MDRD: 33 ML/MIN/1.73M2
GLUCOSE BLD-MCNC: 85 MG/DL (ref 70–125)
HCT VFR BLD AUTO: 23.9 % (ref 35–47)
HGB BLD-MCNC: 7.3 G/DL (ref 12–16)
MAGNESIUM SERPL-MCNC: 1.4 MG/DL (ref 1.8–2.6)
MCH RBC QN AUTO: 32 PG (ref 27–34)
MCHC RBC AUTO-ENTMCNC: 30.5 G/DL (ref 32–36)
MCV RBC AUTO: 105 FL (ref 80–100)
PHOSPHATE SERPL-MCNC: 1.9 MG/DL (ref 2.5–4.5)
PLATELET # BLD AUTO: 303 THOU/UL (ref 140–440)
PMV BLD AUTO: 9.1 FL (ref 8.5–12.5)
POTASSIUM BLD-SCNC: 5.8 MMOL/L (ref 3.5–5)
RBC # BLD AUTO: 2.28 MILL/UL (ref 3.8–5.4)
SODIUM SERPL-SCNC: 139 MMOL/L (ref 136–145)
TACROLIMUS BLD-MCNC: 12.9 UG/L (ref 5–15)
TACROLIMUS LAST DOSE: NORMAL
WBC: 3.5 THOU/UL (ref 4–11)

## 2019-08-27 RX ORDER — VALGANCICLOVIR 450 MG/1
450 TABLET, FILM COATED ORAL EVERY OTHER DAY
Qty: 60 TABLET | Refills: 2 | Status: SHIPPED | OUTPATIENT
Start: 2019-08-27 | End: 2019-10-04

## 2019-08-27 RX ORDER — SODIUM BICARBONATE 650 MG/1
1300 TABLET ORAL 2 TIMES DAILY
Qty: 60 TABLET | Refills: 3 | Status: SHIPPED | OUTPATIENT
Start: 2019-08-27 | End: 2019-08-30

## 2019-08-27 RX ORDER — LACTOBACILLUS RHAMNOSUS GG 10B CELL
1 CAPSULE ORAL 2 TIMES DAILY
COMMUNITY
Start: 2019-08-27 | End: 2019-11-29

## 2019-08-27 NOTE — TELEPHONE ENCOUNTER
"Potassium level 5.8 - Mona denies any palpitations and voiced understanding to proceed to ED for any chest \"funniness.\"  Mona reports that she does eat some potato chips and drinks gatorade due to diarrhea.  RNCC advised to avoid gatorade and chips for now.    With CO2 low, use crystal light to flavor water and will increase from 650 BID to 1,300mg BID.    BP is running 130 / 80s.  Denies any lightheadedness / dizziness.    Stooling continues 6x within 24h period. Stools are loose / watery.   Mona is taking fiber daily. RNCC asked her to take BID.  Please restart culturelle daily.    Phos 1.9 - will start phos supplement. She is no longer taking Renvela.  Mag 1.4 - reviewed high mag foods. Will eat these rather than start supplement due to diarrhea.     INCREASE valcyte to e/o/d, as GFR has improved.     "

## 2019-08-27 NOTE — TELEPHONE ENCOUNTER
Prior Authorization Approval    Authorization Effective Date: 8/27/2019  Authorization Expiration Date: 12/31/2019  Medication: Ketoconazole 200mg tab (1/2 tab = 100mg dose) - P/A APPROVED  Approved Dose/Quantity: 15  Reference #:     Insurance Company: [employer] - Phone [phone] Fax [phone]  Expected CoPay:       CoPay Card Available:      Foundation Assistance Needed:    Which Pharmacy is filling the prescription (Not needed for infusion/clinic administered): [id number] - [place], [place] - [address] [place] [address] AT [facility] [address]  Pharmacy Notified: Yes  Patient Notified:

## 2019-08-27 NOTE — PROGRESS NOTES
Kidney and Pancreas Transplant Coordinator Transition to Outpatient Discharge Note    Pt Name: Mona Wagner  : 1990    Transplant Date: 8/3/19  Hospital Discharge Date: 19    Surgeon: Dr. Johnson  Transplant Coordinator: Laurence Vazquez, RN    Mona Wagner DDRT d/t IgA, right side side   Stent WAS placed.    CMV: D /R+ : Valcyte 3 months; renal dosing  EBV: D /R+    High Risk DSA: No.  PHS Increased Risk: No.    Immunosuppression: Tacro IR per protocl  MMF 750mg BID    Prophylaxis:   Dapsone (allergy to bactrim)  Valcyte per protocol    Lines / drains in place at time of discharge:  NA      Pharmacy after discharge: [facility]  Lab after discharge: [facility]    Post-op course / additional monitoring:  DGF with volume overload.    Patient Active Problem List   Diagnosis     DVT of upper extremity (deep vein thrombosis) (H)     Seizure disorder (H)     HTN, kidney transplant related     Other general symptoms(780.99)     Galactorrhea     Acquired hypothyroidism     Anemia in chronic kidney disease     Increased prolactin level (H)     Normocytic anemia     Thrombocytopenia (H)     Infective endocarditis     Aftercare following organ transplant     Immunosuppression (H)     Delayed graft function of kidney     Methemoglobinemia     Kidney replaced by transplant     Anxiety     Secondary renal hyperparathyroidism (H)     Vitamin D deficiency     Acute kidney failure, unspecified (H)       Education:  Writer met with Mona Wagner and Jt, her . We discussed immunosuppression medications, indications, side effects, dose adjustments, and lab monitoring. Lab draw schedule was given to pt and fully explained - Mailers not given; no questions. Further education was provided on typical post transplant course, labs examined with thresholds, biopsy, infection prevention, office contact numbers, and when to call.     Pt questions for follow up: Patient declines at this time.     Discharge Transition  Plan:   Pt will transition home, with assistance from Jt. Pt will have home care [facility] [facility].   Pt states having working BP monitor, scale, and thermometer at home.      Stent removal date: see Epic appointments tab      Patient has viewed My Transplant Place, and has no additional questions at this time. RNCC encouraged on-going review.  Patient has voiced understanding of ability to utilize MyChart for self-review of lab values.    Special/Additional needs: Yes - ride service through [facility]    Laurence Vazquez RN  Post-Kidney Transplant Coordinator  (ph) [phone]

## 2019-08-28 ENCOUNTER — TELEPHONE (OUTPATIENT)
Dept: PHARMACY | Facility: PHYSICIAN GROUP | Age: 29
End: 2019-08-28

## 2019-08-28 ENCOUNTER — MYC MEDICAL ADVICE (OUTPATIENT)
Dept: OTHER | Age: 29
End: 2019-08-28

## 2019-08-28 DIAGNOSIS — Z94.0 KIDNEY REPLACED BY TRANSPLANT: Primary | ICD-10-CM

## 2019-08-28 RX ORDER — FLUDROCORTISONE ACETATE 0.1 MG/1
0.1 TABLET ORAL 2 TIMES DAILY
Qty: 60 TABLET | Refills: 3 | Status: SHIPPED | OUTPATIENT
Start: 2019-08-28 | End: 2019-08-28

## 2019-08-28 NOTE — TELEPHONE ENCOUNTER
Post Kidney and Pancreas Transplant Team Conference  Date: 8/28/2019  Transplant Coordinator: Laurence Vazquez     Attendees:  []  Dr. Hill  [x] Leonela Brewster LPN     []  Dr. Wilson [x] Cinthia Cox RN [] Kiersten Chang LPN   [x]  Dr. Rios [] Gely Lynn, RN     [] Keeley Li RN [] Anthony Hendricks, PharmD   [] Dr. Johnson [x] Melani Vazquez RN    [] Dr. Durbin [] Virgilio Ryan RN    [] Dr. Pinto [] Maria Teresa Mayberry RN    [] Dr. Delgadillo [] Yesika Wong, ERMELINDA     [] Melanie Ayala RN    [] Surgery Fellow [x] Nancy Maldonado RN    [] Johana Linda, NP [] Nathalia Moreno RN        Verbal Plan Read Back:   Start florinef 0.1mg twice daily      Routed to RN Coordinator   Leonela Brewster LPN

## 2019-08-28 NOTE — TELEPHONE ENCOUNTER
[place] certain [place] are [place] for psyllium products.    [id number], [id number], [id number], [id number]    Faxed updated Rx to pharmacy.     Aurora Flowers, PharmD, CDE  [facility]  Phone: [phone]  August 28, 2019 at 12:22 PM

## 2019-08-29 ENCOUNTER — TELEPHONE (OUTPATIENT)
Dept: TRANSPLANT | Facility: CLINIC | Age: 29
End: 2019-08-29

## 2019-08-29 ENCOUNTER — HOME CARE/HOSPICE - HEALTHEAST (OUTPATIENT)
Dept: HOME HEALTH SERVICES | Facility: HOME HEALTH | Age: 29
End: 2019-08-29

## 2019-08-29 ENCOUNTER — TELEPHONE (OUTPATIENT)
Dept: PHARMACY | Facility: CLINIC | Age: 29
End: 2019-08-29

## 2019-08-29 ENCOUNTER — RECORDS - HEALTHEAST (OUTPATIENT)
Dept: LAB | Facility: HOSPITAL | Age: 29
End: 2019-08-29

## 2019-08-29 DIAGNOSIS — Z94.0 KIDNEY REPLACED BY TRANSPLANT: Primary | ICD-10-CM

## 2019-08-29 DIAGNOSIS — D63.1 ANEMIA OF CHRONIC RENAL FAILURE, STAGE 3 (MODERATE) (H): ICD-10-CM

## 2019-08-29 DIAGNOSIS — N18.30 CKD (CHRONIC KIDNEY DISEASE) STAGE 3, GFR 30-59 ML/MIN (H): ICD-10-CM

## 2019-08-29 DIAGNOSIS — N18.30 ANEMIA OF CHRONIC RENAL FAILURE, STAGE 3 (MODERATE) (H): ICD-10-CM

## 2019-08-29 LAB
ANION GAP SERPL CALCULATED.3IONS-SCNC: 7 MMOL/L (ref 5–18)
BUN SERPL-MCNC: 25 MG/DL (ref 8–22)
CALCIUM SERPL-MCNC: 10.3 MG/DL (ref 8.5–10.5)
CHLORIDE BLD-SCNC: 116 MMOL/L (ref 98–107)
CO2 SERPL-SCNC: 16 MMOL/L (ref 22–31)
CREAT SERPL-MCNC: 1.72 MG/DL (ref 0.6–1.1)
ERYTHROCYTE [DISTWIDTH] IN BLOOD BY AUTOMATED COUNT: 14.3 % (ref 11–14.5)
GFR SERPL CREATININE-BSD FRML MDRD: 36 ML/MIN/1.73M2
GLUCOSE BLD-MCNC: 79 MG/DL (ref 70–125)
HCT VFR BLD AUTO: 24.6 % (ref 35–47)
HGB BLD-MCNC: 7.7 G/DL (ref 12–16)
MCH RBC QN AUTO: 31 PG (ref 27–34)
MCHC RBC AUTO-ENTMCNC: 31.3 G/DL (ref 32–36)
MCV RBC AUTO: 99 FL (ref 80–100)
PLATELET # BLD AUTO: 303 THOU/UL (ref 140–440)
PMV BLD AUTO: 9.3 FL (ref 8.5–12.5)
POTASSIUM BLD-SCNC: 5.9 MMOL/L (ref 3.5–5)
RBC # BLD AUTO: 2.48 MILL/UL (ref 3.8–5.4)
SODIUM SERPL-SCNC: 139 MMOL/L (ref 136–145)
TACROLIMUS BLD-MCNC: 12.5 UG/L (ref 5–15)
TACROLIMUS LAST DOSE: NORMAL
WBC: 3.4 THOU/UL (ref 4–11)

## 2019-08-29 RX ORDER — FLUDROCORTISONE ACETATE 0.1 MG/1
0.1 TABLET ORAL 2 TIMES DAILY
Qty: 180 TABLET | Refills: 3 | Status: SHIPPED | OUTPATIENT
Start: 2019-08-29 | End: 2019-09-03

## 2019-08-29 RX ORDER — TACROLIMUS 5 MG/1
10 CAPSULE ORAL 2 TIMES DAILY
Qty: 120 CAPSULE | Refills: 11 | Status: SHIPPED | OUTPATIENT
Start: 2019-08-29 | End: 2019-09-20

## 2019-08-29 NOTE — TELEPHONE ENCOUNTER
RNCC spoke with REGGIE Karimi RN - tacro level on 8/27 = 12.9.    REDUCE tacro to 10mg BID.    Home care was only able to draw enough blood for tacro level and bmp.  Will try to add on hgb only and not full CBC OR will send out PRN draw.

## 2019-08-29 NOTE — TELEPHONE ENCOUNTER
Anemia Management Note - Enrollment  SUBJECTIVE/OBJECTIVE:    Referred by Dr. Nakul Hill on 2019  Primary Diagnosis: Anemia in Chronic Kidney Disease (N18.3, D63.1)     Secondary Diagnosis:  Chronic Kidney Disease, Stage 3 (N18.3)   Kidney Tx: 2019  Hgb goal range:  9-10  Epo/Darbo: Aranesp  40 mcg  every two weeks for Hgb <10.  In clinic.  [facility]  Iron regimen:  NA  Labs : 2020  Recent CHARLINE use, transfusion, IV iron: PO Iron and Aranesp   RX/TX plans :2020  No history of stroke, MI and blood clots or cancers  Contact:  Ok to leave message  per consent to communicate dated 05/15/2019    OK to speak with Jt Aleman () per consent to communicate dated 05/15/2013    [facility]; [phone]  ; [facility] Ph# [phone]  Fax # [phone]    Anemia Latest Ref Rng & Units 2019 2019 2019 2019 8/15/2019 2019 2019   Hemoglobin 11.7 - 15.7 g/dL 7.1(L) 6.7(LL) 8.2(L) 7.3(L) 8.7(L) 7.6(L) 7.6(L)   TSAT 15 - 46 % - 59(H) - - - - -   Ferritin 12 - 150 ng/mL - - - - - - -       BP Readings from Last 3 Encounters:   19 107/72   19 136/87   19 (!) 135/90     Wt Readings from Last 2 Encounters:   19 53.9 kg (118 lb 12.8 oz)   19 54.3 kg (119 lb 11.2 oz)     Current Outpatient Medications   Medication Sig Dispense Refill     acetaminophen (TYLENOL) 325 MG tablet Take 2 tablets (650 mg) by mouth every 4 hours as needed for mild pain 100 tablet 3     amLODIPine (NORVASC) 10 MG tablet Take 0.5 tablets (5 mg) by mouth daily 30 tablet 5     aspirin (ASA) 81 MG chewable tablet Take 1 tablet (81 mg) by mouth daily 30 tablet 5     atorvastatin (LIPITOR) 10 MG tablet Take 1 tablet (10 mg) by mouth daily 30 tablet 11     cinacalcet (SENSIPAR) 30 MG tablet Take 1 tablet (30 mg) by mouth daily 30 tablet 2     diphenhydrAMINE (BENADRYL) 25 MG tablet Take 1-2 tablets (25-50 mg) by mouth  every 6 hours as needed for itching or allergies 60 tablet 1     EPINEPHrine (EPIPEN/ADRENACLICK/OR ANY BX GENERIC EQUIV) 0.3 MG/0.3ML injection 2-pack Inject 0.3 mLs (0.3 mg) into the muscle as needed for anaphylaxis 0.6 mL 3     fludrocortisone (FLORINEF) 0.1 MG tablet Take 1 tablet (0.1 mg) by mouth 2 times daily 180 tablet 3     hydrOXYzine (ATARAX) 10 MG tablet Take 1 tablet (10 mg) by mouth 3 times daily as needed for anxiety 20 tablet 0     lactobacillus rhamnosus, GG, (CULTURELL) capsule Take 1 capsule by mouth 2 times daily       levothyroxine (SYNTHROID/LEVOTHROID) 25 MCG tablet Take 1 tablet (25 mcg) Tuesday, Thursday, Saturday and Sunday and 1.5 tablets (37.5 mcg) on Monday, Wednesday and Friday every week. 105 tablet 1     multivitamin RENAL (NEPHROCAPS/TRIPHROCAPS) 1 MG capsule Take 1 capsule by mouth daily 30 capsule 3     MYFORTIC (BRAND) 180 MG EC tablet Take 3 tablets (540 mg) by mouth 2 times daily 180 tablet 11     norethindrone (MICRONOR) 0.35 MG tablet Do not restart until your follow up appointment with your surgeon. Discuss restart date at that appointment. (Patient not taking: Reported on 8/18/2019)       ondansetron (ZOFRAN ODT) 4 MG ODT tab Take 1-2 tablets (4-8 mg) by mouth every 8 hours as needed for nausea 60 tablet 1     pentamidine (NEBUPENT) 300 MG neb solution Inhale 300 mg into the lungs every 28 days       phenylephrine-shark liver oil-mineral oil-petrolatum (PREPARATION H) 0.25-3-14-71.9 % rectal ointment Place rectally 2 times daily as needed for hemorrhoids 28.4 g 1     phosphorus tablet 250 mg (PHOSPHORUS TABLET 250 MG) 250 MG per tablet Take 1 tablet (250 mg) by mouth 2 times daily 60 tablet 1     psyllium (METAMUCIL/KONSYL) capsule Take 1 capsule by mouth daily 90 capsule 3     simethicone (MYLICON) 125 MG chewable tablet Take 1 tablet (125 mg) by mouth 4 times daily as needed for intestinal gas       sodium bicarbonate 650 MG tablet Take 2 tablets (1,300 mg) by mouth 2  times daily 60 tablet 3     tacrolimus (GENERIC EQUIVALENT) 0.5 MG capsule Take 1 cap by mouth twice daily as directed by Transplant Center for dose adjustment 30 capsule 0     tacrolimus (GENERIC EQUIVALENT) 5 MG capsule Take 2 capsules (10 mg) by mouth 2 times daily 120 capsule 11     valGANciclovir (VALCYTE) 450 MG tablet Take 1 tablet (450 mg) by mouth every other day 60 tablet 2     vitamin D3 (CHOLECALCIFEROL) 2000 units (50 mcg) tablet Take 1 tablet (2,000 Units) by mouth daily 90 tablet 3     ASSESSMENT:  Hgb Not at goal/Initiation of therapy   Ferritin: Due for labs  TSat: elevated at >50%  Iron regimen recommended: NA  Recommended CHARLINE regimen: Aranesp 40mcg every 14 days  Blood Pressure: Stable.  Hx of HTN    PLAN:  1. Patient called today for enrollment in Anemia Management Service.  2. Discussed:  anemia overview, monitoring service and goal hemoglobin range and rationale and risks of CHARLINE blood clots, stroke and increase in blood pressure  3. Dose location: in clinic [facility]  4. Labs: [facility]   5. Pharmacy: NA      Patient verbalized understanding of the plan.     Mona is currently having her labs drawn by [facility]. She will go to [facility] for her Aranesp injection.  Order sent to Dr. Hill for signature.   Once orders are signed, they need to be faxed to [facility].      Next call date:  9/3/2019    Manjula Mckinnon RN   Anemia Services  [address] [address]   [email or web address]   Office : [phone]  Fax: [phone]

## 2019-08-30 ENCOUNTER — APPOINTMENT (OUTPATIENT)
Dept: ULTRASOUND IMAGING | Facility: CLINIC | Age: 29
End: 2019-08-30
Attending: EMERGENCY MEDICINE
Payer: MEDICARE

## 2019-08-30 ENCOUNTER — TELEPHONE (OUTPATIENT)
Dept: TRANSPLANT | Facility: CLINIC | Age: 29
End: 2019-08-30

## 2019-08-30 ENCOUNTER — HOSPITAL ENCOUNTER (EMERGENCY)
Facility: CLINIC | Age: 29
Discharge: HOME | End: 2019-08-31
Attending: EMERGENCY MEDICINE | Admitting: EMERGENCY MEDICINE
Payer: MEDICARE

## 2019-08-30 DIAGNOSIS — D63.1 ANEMIA OF CHRONIC RENAL FAILURE, STAGE 3 (MODERATE) (H): ICD-10-CM

## 2019-08-30 DIAGNOSIS — Z94.0 KIDNEY REPLACED BY TRANSPLANT: ICD-10-CM

## 2019-08-30 DIAGNOSIS — E87.20 METABOLIC ACIDOSIS: ICD-10-CM

## 2019-08-30 DIAGNOSIS — E87.5 HYPERKALEMIA: Primary | ICD-10-CM

## 2019-08-30 DIAGNOSIS — N18.30 ANEMIA OF CHRONIC RENAL FAILURE, STAGE 3 (MODERATE) (H): ICD-10-CM

## 2019-08-30 DIAGNOSIS — R31.0 GROSS HEMATURIA: ICD-10-CM

## 2019-08-30 DIAGNOSIS — N18.30 CKD (CHRONIC KIDNEY DISEASE) STAGE 3, GFR 30-59 ML/MIN (H): ICD-10-CM

## 2019-08-30 LAB
ALBUMIN SERPL-MCNC: 3.9 G/DL (ref 3.4–5)
ALP SERPL-CCNC: 167 U/L (ref 40–150)
ALT SERPL W P-5'-P-CCNC: 16 U/L (ref 0–50)
ANION GAP SERPL CALCULATED.3IONS-SCNC: 4 MMOL/L (ref 3–14)
ANION GAP SERPL CALCULATED.3IONS-SCNC: 5 MMOL/L (ref 3–14)
AST SERPL W P-5'-P-CCNC: <3 U/L (ref 0–45)
BASOPHILS # BLD AUTO: 0.1 10E9/L (ref 0–0.2)
BASOPHILS NFR BLD AUTO: 1.2 %
BILIRUB SERPL-MCNC: 0.3 MG/DL (ref 0.2–1.3)
BUN SERPL-MCNC: 30 MG/DL (ref 7–30)
BUN SERPL-MCNC: 30 MG/DL (ref 7–30)
CALCIUM SERPL-MCNC: 10.2 MG/DL (ref 8.5–10.1)
CALCIUM SERPL-MCNC: 9.8 MG/DL (ref 8.5–10.1)
CHLORIDE SERPL-SCNC: 116 MMOL/L (ref 94–109)
CHLORIDE SERPL-SCNC: 117 MMOL/L (ref 94–109)
CO2 SERPL-SCNC: 19 MMOL/L (ref 20–32)
CO2 SERPL-SCNC: 20 MMOL/L (ref 20–32)
CREAT SERPL-MCNC: 1.65 MG/DL (ref 0.52–1.04)
CREAT SERPL-MCNC: 1.72 MG/DL (ref 0.52–1.04)
DIFFERENTIAL METHOD BLD: ABNORMAL
EOSINOPHIL # BLD AUTO: 0.1 10E9/L (ref 0–0.7)
EOSINOPHIL NFR BLD AUTO: 1.7 %
ERYTHROCYTE [DISTWIDTH] IN BLOOD BY AUTOMATED COUNT: 14.7 % (ref 10–15)
FERRITIN SERPL-MCNC: 1746 NG/ML (ref 12–150)
GFR SERPL CREATININE-BSD FRML MDRD: 40 ML/MIN/{1.73_M2}
GFR SERPL CREATININE-BSD FRML MDRD: 42 ML/MIN/{1.73_M2}
GLUCOSE SERPL-MCNC: 130 MG/DL (ref 70–99)
GLUCOSE SERPL-MCNC: 97 MG/DL (ref 70–99)
HCG SERPL QL: NEGATIVE
HCT VFR BLD AUTO: 22.5 % (ref 35–47)
HCT VFR BLD AUTO: 25.8 % (ref 35–47)
HGB BLD-MCNC: 7.1 G/DL (ref 11.7–15.7)
HGB BLD-MCNC: 8.1 G/DL (ref 11.7–15.7)
IMM GRANULOCYTES # BLD: 0 10E9/L (ref 0–0.4)
IMM GRANULOCYTES NFR BLD: 0.5 %
IRON SATN MFR SERPL: 51 % (ref 15–46)
IRON SERPL-MCNC: 138 UG/DL (ref 35–180)
LYMPHOCYTES # BLD AUTO: 0.4 10E9/L (ref 0.8–5.3)
LYMPHOCYTES NFR BLD AUTO: 8.3 %
MCH RBC QN AUTO: 31 PG (ref 26.5–33)
MCHC RBC AUTO-ENTMCNC: 31.6 G/DL (ref 31.5–36.5)
MCV RBC AUTO: 98 FL (ref 78–100)
MONOCYTES # BLD AUTO: 0.5 10E9/L (ref 0–1.3)
MONOCYTES NFR BLD AUTO: 12.8 %
NEUTROPHILS # BLD AUTO: 3.2 10E9/L (ref 1.6–8.3)
NEUTROPHILS NFR BLD AUTO: 75.5 %
NRBC # BLD AUTO: 0 10*3/UL
NRBC BLD AUTO-RTO: 0 /100
PLATELET # BLD AUTO: 262 10E9/L (ref 150–450)
POTASSIUM SERPL-SCNC: 4.7 MMOL/L (ref 3.4–5.3)
POTASSIUM SERPL-SCNC: 5.3 MMOL/L (ref 3.4–5.3)
PROT SERPL-MCNC: 7.2 G/DL (ref 6.8–8.8)
RBC # BLD AUTO: 2.29 10E12/L (ref 3.8–5.2)
SODIUM SERPL-SCNC: 140 MMOL/L (ref 133–144)
SODIUM SERPL-SCNC: 141 MMOL/L (ref 133–144)
TIBC SERPL-MCNC: 270 UG/DL (ref 240–430)
WBC # BLD AUTO: 4.2 10E9/L (ref 4–11)

## 2019-08-30 PROCEDURE — 99284 EMERGENCY DEPT VISIT MOD MDM: CPT | Mod: 25

## 2019-08-30 PROCEDURE — 80053 COMPREHEN METABOLIC PANEL: CPT | Performed by: EMERGENCY MEDICINE

## 2019-08-30 PROCEDURE — 76776 US EXAM K TRANSPL W/DOPPLER: CPT

## 2019-08-30 PROCEDURE — 99284 EMERGENCY DEPT VISIT MOD MDM: CPT | Mod: Z6 | Performed by: EMERGENCY MEDICINE

## 2019-08-30 PROCEDURE — 81001 URINALYSIS AUTO W/SCOPE: CPT | Performed by: EMERGENCY MEDICINE

## 2019-08-30 PROCEDURE — 84703 CHORIONIC GONADOTROPIN ASSAY: CPT | Performed by: EMERGENCY MEDICINE

## 2019-08-30 PROCEDURE — 87086 URINE CULTURE/COLONY COUNT: CPT | Performed by: EMERGENCY MEDICINE

## 2019-08-30 PROCEDURE — 85610 PROTHROMBIN TIME: CPT | Performed by: EMERGENCY MEDICINE

## 2019-08-30 PROCEDURE — 85025 COMPLETE CBC W/AUTO DIFF WBC: CPT | Performed by: EMERGENCY MEDICINE

## 2019-08-30 RX ORDER — SODIUM POLYSTYRENE SULFONATE 15 G/60ML
15 SUSPENSION ORAL; RECTAL ONCE
Qty: 60 ML | Refills: 4 | Status: SHIPPED | OUTPATIENT
Start: 2019-08-30 | End: 2019-09-26

## 2019-08-30 RX ORDER — SODIUM BICARBONATE 650 MG/1
1300 TABLET ORAL 3 TIMES DAILY
Qty: 180 TABLET | Refills: 3 | Status: SHIPPED | OUTPATIENT
Start: 2019-08-30 | End: 2019-09-03

## 2019-08-30 ASSESSMENT — ENCOUNTER SYMPTOMS
CONFUSION: 0
COLOR CHANGE: 0
EYE REDNESS: 0
FLANK PAIN: 1
FEVER: 0
NECK STIFFNESS: 0
ABDOMINAL PAIN: 0
DIFFICULTY URINATING: 0
HEMATURIA: 1
HEADACHES: 0
ARTHRALGIAS: 0
SHORTNESS OF BREATH: 0

## 2019-08-30 ASSESSMENT — MIFFLIN-ST. JEOR: SCORE: 1193.12

## 2019-08-30 NOTE — TELEPHONE ENCOUNTER
Patient Call: Transplant Lab/Orders  Route to LPN  Post Transplant Days: 27  When patient is less than 60 days post-transplant, route high priority    Reason for Call: Pt is returning a call re-garding her K+ levels labs- please connect when available.      Callback needed? Yes    Return Call Needed  Same as documented in contacts section  When to return call?: Same day: Route High Priority

## 2019-08-30 NOTE — TELEPHONE ENCOUNTER
Patient Call:  Blood pressure readings: 2019 at 2:50PM  Sittin/73  Standin/74          Call back needed? Yes    Return Call Needed  Same as documented in contacts section  When to return call?: Same day: Route High Priority

## 2019-08-30 NOTE — TELEPHONE ENCOUNTER
K still elevated. tacro level not yet back (but will be high, as not reflective of most recent dose reduction).    Taking CO2? 1,300mg BID?    Weight / hydration satus?

## 2019-08-30 NOTE — TELEPHONE ENCOUNTER
K still elevated. tacro level not yet back (but will be high, as not reflective of most recent dose reduction).    Taking CO2? 1,300mg BID? Mona voiced that yes, she is. Will increase to 1,300mg three times daily.  Mona denies that she feels well hydrated.     Weight / hydration satus? Stable at 117lb.  Metamucil 2x daily. BMs are soft / formed.    Discussed with Dr. Wilson - give 1 dose kayexalate (15g) now. Repeat BMP today at [facility].  '

## 2019-08-30 NOTE — TELEPHONE ENCOUNTER
Potassium 5.3   Called patient to follow up and let her know lab results.     She reports that she just went to the bathroom and noticed blood in her urine - unsure if this is from her menstrual cycle or actual blood in her urine, requested that she look again when she goes to the bathroom again, if blood in urine, then needs to go to ED for evaluation.

## 2019-08-30 NOTE — ED AVS SNAPSHOT
[facility], [place], Emergency Department  [address] [place] [id number]  Phone:  [phone]                                    Mona Wagner   MRN: [id number]    Department:  [facility], Emergency Department   Date of Visit:  8/30/2019           After Visit Summary Signature Page    I have received my discharge instructions, and my questions have been answered. I have discussed any challenges I see with this plan with the nurse or doctor.    ..........................................................................................................................................  Patient/Patient Representative Signature      ..........................................................................................................................................  Patient Representative Print Name and Relationship to Patient    ..................................................               ................................................  Date                                   Time    ..........................................................................................................................................  Reviewed by Signature/Title    ...................................................              ..............................................  Date                                               Time          22EPIC Rev 08/18

## 2019-08-31 ENCOUNTER — TELEPHONE (OUTPATIENT)
Dept: TRANSPLANT | Facility: CLINIC | Age: 29
End: 2019-08-31

## 2019-08-31 VITALS
HEART RATE: 86 BPM | TEMPERATURE: 98.4 F | DIASTOLIC BLOOD PRESSURE: 94 MMHG | WEIGHT: 117.2 LBS | OXYGEN SATURATION: 100 % | BODY MASS INDEX: 23.01 KG/M2 | SYSTOLIC BLOOD PRESSURE: 132 MMHG | RESPIRATION RATE: 20 BRPM | HEIGHT: 60 IN

## 2019-08-31 LAB
ALBUMIN UR-MCNC: 10 MG/DL
APPEARANCE UR: CLEAR
BILIRUB UR QL STRIP: NEGATIVE
COLOR UR AUTO: ABNORMAL
GLUCOSE UR STRIP-MCNC: 30 MG/DL
HGB UR QL STRIP: ABNORMAL
INR PPP: 0.95 (ref 0.86–1.14)
KETONES UR STRIP-MCNC: NEGATIVE MG/DL
LEUKOCYTE ESTERASE UR QL STRIP: ABNORMAL
NITRATE UR QL: NEGATIVE
PH UR STRIP: 5 PH (ref 5–7)
RBC #/AREA URNS AUTO: 92 /HPF (ref 0–2)
SOURCE: ABNORMAL
SP GR UR STRIP: 1.01 (ref 1–1.03)
SQUAMOUS #/AREA URNS AUTO: 1 /HPF (ref 0–1)
UROBILINOGEN UR STRIP-MCNC: NORMAL MG/DL (ref 0–2)
WBC #/AREA URNS AUTO: 6 /HPF (ref 0–5)

## 2019-08-31 PROCEDURE — 85610 PROTHROMBIN TIME: CPT | Performed by: EMERGENCY MEDICINE

## 2019-08-31 NOTE — TELEPHONE ENCOUNTER
10:17 AM  August 31, 2019  Returned Mona's call to follow up after her ER visit last evening.  She states she has no blood in her urine at this time.  Encouraged to stay well-hydrated and to call the service back if she has any more blood in her urine.  She expressed understanding.  Daisy Zamudio, RN, BA  On Call Organ Coordinator

## 2019-08-31 NOTE — ED PROVIDER NOTES
History     Chief Complaint   Patient presents with     Flank Pain     Hematuria     HPI  Mona Wagner is a 28 year old female with a history of CKD stage III status post right kidney transplant 27 days ago (August 3) who presents to the Emergency Department today for evaluation of hematuria and flank pain.  The patient reports that about 5:30 PM today she went to urinate and noticed blood in her urine.  At 5:51 PM the patient was contacted by a RN from her recent visit informing her of her potassium of 5.3.  At that time she told the nurse of her hematuria and was instructed that if her symptoms persisted she should present to the ED.  The patient reports that her hematuria continued and she presents the ED for further evaluation.  The patient otherwise reports that she has had some dysuria, urgency, bladder pressure, and right flank pain.  The patient reports that following her transplant she did have some hematuria for a few days, which resolved and she has not experienced hematuria until today.  The patient reports that she has been taking her transplant medications.  No other symptoms noted.    I have reviewed the Medications, Allergies, Past Medical and Surgical History, and Social History in the [facility] system.    Past Medical History:   Diagnosis Date     Anemia in chronic kidney disease      Dialysis patient (H)      End stage renal disease on dialysis (H)      Hypertension      Hypothyroid      Kidney transplanted 08/03/2019    DCD DDKT. Induction with thymo 6mg/kg.     Pituitary adenoma (H)      Past Surgical History:   Procedure Laterality Date     BENCH KIDNEY N/A 8/3/2019    Procedure: BACKBENCH PREPARATION, ALLOGRAFT, KIDNEY;  Surgeon: Dorian Johnson MD;  Location: [facility] OR     CREATE FISTULA ARTERIOVENOUS UPPER EXTREMITY  1/2/2014    Procedure: CREATE FISTULA ARTERIOVENOUS UPPER EXTREMITY;  Left Wrist Arteriovenous Fistula Placement;  Surgeon: Shashi Castro MD;  Location: [facility] OR     RASTA/DIALYSIS  CATHETER  12/10/2013          TRANSPLANT KIDNEY RECIPIENT  DONOR N/A 8/3/2019    Procedure: TRANSPLANT, KIDNEY, RECIPIENT,  DONOR with Ureteral Stent Placement;  Surgeon: Dorian Johnson MD;  Location: [facility] OR     Family History   Problem Relation Age of Onset     Hypertension Sister      Diabetes Sister      Cerebrovascular Disease Sister      Kidney Disease Mother      Kidney Disease Sister      Cerebrovascular Disease Father      Coronary Artery Disease No family hx of      Breast Cancer No family hx of      Cancer - colorectal No family hx of      Ovarian Cancer No family hx of      Prostate Cancer No family hx of      Other Cancer No family hx of      Asthma No family hx of      Social History     Tobacco Use     Smoking status: Never Smoker     Smokeless tobacco: Never Used   Substance Use Topics     Alcohol use: No     Alcohol/week: 0.0 oz     No current facility-administered medications for this encounter.      Current Outpatient Medications   Medication     acetaminophen (TYLENOL) 325 MG tablet     amLODIPine (NORVASC) 10 MG tablet     aspirin (ASA) 81 MG chewable tablet     atorvastatin (LIPITOR) 10 MG tablet     cinacalcet (SENSIPAR) 30 MG tablet     diphenhydrAMINE (BENADRYL) 25 MG tablet     EPINEPHrine (EPIPEN/ADRENACLICK/OR ANY BX GENERIC EQUIV) 0.3 MG/0.3ML injection 2-pack     fludrocortisone (FLORINEF) 0.1 MG tablet     hydrOXYzine (ATARAX) 10 MG tablet     levothyroxine (SYNTHROID/LEVOTHROID) 25 MCG tablet     multivitamin RENAL (NEPHROCAPS/TRIPHROCAPS) 1 MG capsule     MYFORTIC (BRAND) 180 MG EC tablet     norethindrone (MICRONOR) 0.35 MG tablet     ondansetron (ZOFRAN ODT) 4 MG ODT tab     phenylephrine-shark liver oil-mineral oil-petrolatum (PREPARATION H) 0.25-3-14-71.9 % rectal ointment     psyllium (METAMUCIL/KONSYL) capsule     simethicone (MYLICON) 125 MG chewable tablet     sodium bicarbonate 650 MG tablet     sodium polystyrene (KAYEXALATE) 15 GM/60ML suspension      tacrolimus (GENERIC EQUIVALENT) 0.5 MG capsule     tacrolimus (GENERIC EQUIVALENT) 5 MG capsule     valGANciclovir (VALCYTE) 450 MG tablet     vitamin D3 (CHOLECALCIFEROL) 2000 units (50 mcg) tablet     lactobacillus rhamnosus, GG, (CULTURELL) capsule     pentamidine (NEBUPENT) 300 MG neb solution     Allergies   Allergen Reactions     Chlorhexidine Rash     Rash at site     Sulfa Drugs Rash     Muscle stiffness of neck     Dapsone      Methemoglobinemia     Furosemide Other (See Comments)     Skin flushing     Lisinopril Swelling     angioedema      Review of Systems   Constitutional: Negative for fever.   HENT: Negative for congestion.    Eyes: Negative for redness.   Respiratory: Negative for shortness of breath.    Cardiovascular: Negative for chest pain.   Gastrointestinal: Negative for abdominal pain.   Genitourinary: Positive for flank pain, hematuria and urgency. Negative for difficulty urinating.   Musculoskeletal: Negative for arthralgias and neck stiffness.   Skin: Negative for color change.   Neurological: Negative for headaches.   Psychiatric/Behavioral: Negative for confusion.     Physical Exam   BP: 139/78  Pulse: 116  Temp: 97.9  F (36.6  C)  Resp: 20  Height: 152.4 cm (5')  Weight: 53.2 kg (117 lb 3.2 oz)  SpO2: 98 %    Physical Exam   Constitutional: She is oriented to person, place, and time. She appears well-developed and well-nourished. No distress.   HENT:   Head: Normocephalic and atraumatic.   Mouth/Throat: No oropharyngeal exudate.   Eyes: Pupils are equal, round, and reactive to light. Right eye exhibits no discharge. Left eye exhibits no discharge. No scleral icterus.   Neck: Normal range of motion. Neck supple.   Cardiovascular: Normal rate, regular rhythm, normal heart sounds and intact distal pulses. Exam reveals no gallop and no friction rub.   No murmur heard.  Pulmonary/Chest: Effort normal and breath sounds normal. No respiratory distress. She has no wheezes. She exhibits no  tenderness.   Abdominal: Soft. Bowel sounds are normal. She exhibits no distension. There is no tenderness.   Surgical site appears well-healing with no erythema, tenderness or drainage.    Musculoskeletal: Normal range of motion. She exhibits no edema, tenderness or deformity.   Neurological: She is alert and oriented to person, place, and time. No cranial nerve deficit.   Skin: Skin is warm and dry. No rash noted. She is not diaphoretic. No erythema. No pallor.   Psychiatric: She has a normal mood and affect.   Nursing note and vitals reviewed.      ED Course   10:40 PM  The patient was seen and examined by Dr. Kapadia in Room 19.       Procedures             Critical Care time:  none             Labs Ordered and Resulted from Time of ED Arrival Up to the Time of Departure from the ED   CBC WITH PLATELETS DIFFERENTIAL - Abnormal; Notable for the following components:       Result Value    RBC Count 2.29 (*)     Hemoglobin 7.1 (*)     Hematocrit 22.5 (*)     Absolute Lymphocytes 0.4 (*)     All other components within normal limits   COMPREHENSIVE METABOLIC PANEL - Abnormal; Notable for the following components:    Chloride 117 (*)     Carbon Dioxide 19 (*)     Glucose 130 (*)     Creatinine 1.72 (*)     GFR Estimate 40 (*)     GFR Estimate If Black 46 (*)     Alkaline Phosphatase 167 (*)     All other components within normal limits   HCG QUALITATIVE   ROUTINE UA WITH MICROSCOPIC   URINE CULTURE AEROBIC BACTERIAL          Assessments & Plan (with Medical Decision Making)   This is a 28 year old female with kidney transplant on 8-3 who presents with hematuria. This started this evening. She has had a similar occurrence in the past shortly after her transplant. On exam, patient is well appearing. Surgical site appears to be healing well and is non-tender. Lab work shows no acute abnormalities. Creatinine is similar to previous, Hgb is at baseline. UA shows blood but no likely UTI. Urine culture is pending at this  time. US of transplanted kidney is normal. I discussed the case with the transplant team who recommends no further workup and to have patient follow-up in their clinic tomorrow morning. I discussed all results with the patient. Will discharge home with return precautions. Discussed reasons to return to the emergency department.  Patient understands and agrees with this plan.    I have reviewed the nursing notes.    I have reviewed the findings, diagnosis, plan and need for follow up with the patient.  New Prescriptions    No medications on file     Final diagnoses:   None   Trav HAYNES, am serving as a trained medical scribe to document services personally performed by Baljit Kapadia DO, based on the provider's statements to me.   Baljit HAYNES DO, was physically present and have reviewed and verified the accuracy of this note documented by Trav Alves.     8/30/2019   [facility], [place], EMERGENCY DEPARTMENT     Baljit Kapadia DO  09/11/19 [id number]

## 2019-08-31 NOTE — DISCHARGE INSTRUCTIONS
Follow-up with transplant clinic tomorrow. Return to the emergency department if you are unable to be seen, you are unable to urinate, there are any new symptoms or any cause for concern.

## 2019-08-31 NOTE — ED PROVIDER NOTES
The patient was accepted at shift change signout with a plan for me to follow-up on her INR, discharge if normal.  INR is normal.  She will be discharged home, per plan at time of signout.  Please see Dr. Phelps's note for further details.    Dictation Disclaimer: Some of this Note has been completed with voice-recognition dictation software. Although errors are generally corrected real-time, there is the potential for a rare error to be present in the completed chart.       Nicole Medina MD  08/31/19 [id number]

## 2019-08-31 NOTE — ED TRIAGE NOTES
"Pt arrives from home via triage with hx of recent right kidney transplant on 8/3/19. At 1700 pt noticed \"a lot of blood when I urinated\" and has had bloody urine since with urgency, some burning with urination, bladder fullness, and right flank pain. Denies fevers, chills, but endorses SOB with exertion.  VSS on RA, afebrile.  "

## 2019-09-01 LAB
BACTERIA SPEC CULT: NORMAL
Lab: NORMAL
SPECIMEN SOURCE: NORMAL

## 2019-09-02 ENCOUNTER — HOME CARE/HOSPICE - HEALTHEAST (OUTPATIENT)
Dept: HOME HEALTH SERVICES | Facility: HOME HEALTH | Age: 29
End: 2019-09-02

## 2019-09-03 ENCOUNTER — TELEPHONE (OUTPATIENT)
Dept: TRANSPLANT | Facility: CLINIC | Age: 29
End: 2019-09-03

## 2019-09-03 ENCOUNTER — RECORDS - HEALTHEAST (OUTPATIENT)
Dept: LAB | Facility: HOSPITAL | Age: 29
End: 2019-09-03

## 2019-09-03 ENCOUNTER — OFFICE VISIT (OUTPATIENT)
Dept: NEPHROLOGY | Facility: CLINIC | Age: 29
End: 2019-09-03
Attending: INTERNAL MEDICINE
Payer: MEDICARE

## 2019-09-03 ENCOUNTER — RESULTS ONLY (OUTPATIENT)
Dept: OTHER | Facility: CLINIC | Age: 29
End: 2019-09-03

## 2019-09-03 VITALS
OXYGEN SATURATION: 100 % | TEMPERATURE: 97.9 F | SYSTOLIC BLOOD PRESSURE: 128 MMHG | BODY MASS INDEX: 23.14 KG/M2 | WEIGHT: 118.5 LBS | DIASTOLIC BLOOD PRESSURE: 84 MMHG | HEART RATE: 91 BPM

## 2019-09-03 DIAGNOSIS — N18.30 ANEMIA IN STAGE 3 CHRONIC KIDNEY DISEASE (H): ICD-10-CM

## 2019-09-03 DIAGNOSIS — D84.9 IMMUNOSUPPRESSION (H): ICD-10-CM

## 2019-09-03 DIAGNOSIS — Z48.298 AFTERCARE FOLLOWING ORGAN TRANSPLANT: ICD-10-CM

## 2019-09-03 DIAGNOSIS — E83.42 HYPOMAGNESEMIA: Primary | ICD-10-CM

## 2019-09-03 DIAGNOSIS — Z94.0 KIDNEY REPLACED BY TRANSPLANT: ICD-10-CM

## 2019-09-03 DIAGNOSIS — Z79.899 LONG TERM USE OF DRUG: ICD-10-CM

## 2019-09-03 DIAGNOSIS — E87.20 METABOLIC ACIDOSIS: ICD-10-CM

## 2019-09-03 DIAGNOSIS — N18.30 CKD (CHRONIC KIDNEY DISEASE) STAGE 3, GFR 30-59 ML/MIN (H): ICD-10-CM

## 2019-09-03 DIAGNOSIS — D63.1 ANEMIA OF CHRONIC RENAL FAILURE, STAGE 3 (MODERATE) (H): ICD-10-CM

## 2019-09-03 DIAGNOSIS — N18.30 ANEMIA OF CHRONIC RENAL FAILURE, STAGE 3 (MODERATE) (H): ICD-10-CM

## 2019-09-03 DIAGNOSIS — D63.1 ANEMIA IN STAGE 3 CHRONIC KIDNEY DISEASE (H): ICD-10-CM

## 2019-09-03 LAB
ANION GAP SERPL CALCULATED.3IONS-SCNC: 6 MMOL/L (ref 3–14)
ANION GAP SERPL CALCULATED.3IONS-SCNC: 7 MMOL/L (ref 5–18)
BASOPHILS # BLD AUTO: 0 THOU/UL (ref 0–0.2)
BASOPHILS NFR BLD AUTO: 1 % (ref 0–2)
BUN SERPL-MCNC: 24 MG/DL (ref 8–22)
BUN SERPL-MCNC: 27 MG/DL (ref 7–30)
CALCIUM SERPL-MCNC: 10.7 MG/DL (ref 8.5–10.5)
CALCIUM SERPL-MCNC: 9.7 MG/DL (ref 8.5–10.1)
CHLORIDE BLD-SCNC: 114 MMOL/L (ref 98–107)
CHLORIDE SERPL-SCNC: 113 MMOL/L (ref 94–109)
CO2 SERPL-SCNC: 20 MMOL/L (ref 22–31)
CO2 SERPL-SCNC: 22 MMOL/L (ref 20–32)
CREAT SERPL-MCNC: 1.45 MG/DL (ref 0.52–1.04)
CREAT SERPL-MCNC: 1.5 MG/DL (ref 0.6–1.1)
CREAT UR-MCNC: 60 MG/DL
EOSINOPHIL # BLD AUTO: 0.1 THOU/UL (ref 0–0.4)
EOSINOPHIL NFR BLD AUTO: 1 % (ref 0–6)
ERYTHROCYTE [DISTWIDTH] IN BLOOD BY AUTOMATED COUNT: 14.9 % (ref 11–14.5)
ERYTHROCYTE [DISTWIDTH] IN BLOOD BY AUTOMATED COUNT: 15 % (ref 10–15)
FERRITIN SERPL-MCNC: 1535 NG/ML (ref 12–150)
GFR SERPL CREATININE-BSD FRML MDRD: 42 ML/MIN/1.73M2
GFR SERPL CREATININE-BSD FRML MDRD: 49 ML/MIN/{1.73_M2}
GLUCOSE BLD-MCNC: 88 MG/DL (ref 70–125)
GLUCOSE SERPL-MCNC: 117 MG/DL (ref 70–99)
HCT VFR BLD AUTO: 22.8 % (ref 35–47)
HCT VFR BLD AUTO: 23.7 % (ref 35–47)
HGB BLD-MCNC: 7.3 G/DL (ref 11.7–15.7)
HGB BLD-MCNC: 7.6 G/DL (ref 12–16)
IRON SATN MFR SERPL: 34 % (ref 15–46)
IRON SERPL-MCNC: 87 UG/DL (ref 35–180)
LYMPHOCYTES # BLD AUTO: 0.3 THOU/UL (ref 0.8–4.4)
LYMPHOCYTES NFR BLD AUTO: 7 % (ref 20–40)
MAGNESIUM SERPL-MCNC: 1.3 MG/DL (ref 1.8–2.6)
MAGNESIUM SERPL-MCNC: 1.4 MG/DL (ref 1.6–2.3)
MCH RBC QN AUTO: 31.5 PG (ref 27–34)
MCH RBC QN AUTO: 31.7 PG (ref 26.5–33)
MCHC RBC AUTO-ENTMCNC: 32 G/DL (ref 31.5–36.5)
MCHC RBC AUTO-ENTMCNC: 32.1 G/DL (ref 32–36)
MCV RBC AUTO: 98 FL (ref 80–100)
MCV RBC AUTO: 99 FL (ref 78–100)
MONOCYTES # BLD AUTO: 0.3 THOU/UL (ref 0–0.9)
MONOCYTES NFR BLD AUTO: 8 % (ref 2–10)
NEUTROPHILS # BLD AUTO: 3.6 THOU/UL (ref 2–7.7)
NEUTROPHILS NFR BLD AUTO: 83 % (ref 50–70)
PHOSPHATE SERPL-MCNC: 1.8 MG/DL (ref 2.5–4.5)
PHOSPHATE SERPL-MCNC: 2.1 MG/DL (ref 2.5–4.5)
PLATELET # BLD AUTO: 234 10E9/L (ref 150–450)
PLATELET # BLD AUTO: 274 THOU/UL (ref 140–440)
PMV BLD AUTO: 9.4 FL (ref 8.5–12.5)
POTASSIUM BLD-SCNC: 4.5 MMOL/L (ref 3.5–5)
POTASSIUM SERPL-SCNC: 4.1 MMOL/L (ref 3.4–5.3)
PROT UR-MCNC: 0.14 G/L
PROT/CREAT 24H UR: 0.24 G/G CR (ref 0–0.2)
RBC # BLD AUTO: 2.3 10E12/L (ref 3.8–5.2)
RBC # BLD AUTO: 2.41 MILL/UL (ref 3.8–5.4)
SODIUM SERPL-SCNC: 140 MMOL/L (ref 133–144)
SODIUM SERPL-SCNC: 141 MMOL/L (ref 136–145)
TACROLIMUS BLD-MCNC: 9.4 UG/L (ref 5–15)
TIBC SERPL-MCNC: 254 UG/DL (ref 240–430)
TME LAST DOSE: NORMAL H
WBC # BLD AUTO: 3.6 10E9/L (ref 4–11)
WBC: 4.4 THOU/UL (ref 4–11)

## 2019-09-03 PROCEDURE — 83550 IRON BINDING TEST: CPT | Performed by: INTERNAL MEDICINE

## 2019-09-03 PROCEDURE — 83540 ASSAY OF IRON: CPT | Performed by: INTERNAL MEDICINE

## 2019-09-03 PROCEDURE — 84100 ASSAY OF PHOSPHORUS: CPT | Performed by: INTERNAL MEDICINE

## 2019-09-03 PROCEDURE — 86832 HLA CLASS I HIGH DEFIN QUAL: CPT | Performed by: INTERNAL MEDICINE

## 2019-09-03 PROCEDURE — 80048 BASIC METABOLIC PNL TOTAL CA: CPT | Performed by: INTERNAL MEDICINE

## 2019-09-03 PROCEDURE — 86833 HLA CLASS II HIGH DEFIN QUAL: CPT | Performed by: INTERNAL MEDICINE

## 2019-09-03 PROCEDURE — 40001063 ZZHCL STATISTIC ALLOSURE: Performed by: INTERNAL MEDICINE

## 2019-09-03 PROCEDURE — 83735 ASSAY OF MAGNESIUM: CPT | Performed by: INTERNAL MEDICINE

## 2019-09-03 PROCEDURE — 86828 HLA CLASS I&II ANTIBODY QUAL: CPT | Performed by: INTERNAL MEDICINE

## 2019-09-03 PROCEDURE — 85027 COMPLETE CBC AUTOMATED: CPT | Performed by: INTERNAL MEDICINE

## 2019-09-03 PROCEDURE — 36415 COLL VENOUS BLD VENIPUNCTURE: CPT | Performed by: INTERNAL MEDICINE

## 2019-09-03 PROCEDURE — 82728 ASSAY OF FERRITIN: CPT | Performed by: INTERNAL MEDICINE

## 2019-09-03 PROCEDURE — 84156 ASSAY OF PROTEIN URINE: CPT | Performed by: INTERNAL MEDICINE

## 2019-09-03 PROCEDURE — G0463 HOSPITAL OUTPT CLINIC VISIT: HCPCS | Mod: ZF

## 2019-09-03 PROCEDURE — 87799 DETECT AGENT NOS DNA QUANT: CPT | Performed by: INTERNAL MEDICINE

## 2019-09-03 PROCEDURE — 80197 ASSAY OF TACROLIMUS: CPT | Performed by: INTERNAL MEDICINE

## 2019-09-03 PROCEDURE — 80180 DRUG SCRN QUAN MYCOPHENOLATE: CPT | Performed by: INTERNAL MEDICINE

## 2019-09-03 RX ORDER — FLUDROCORTISONE ACETATE 0.1 MG/1
0.1 TABLET ORAL
Qty: 180 TABLET | Refills: 3 | COMMUNITY
Start: 2019-09-03 | End: 2019-12-03

## 2019-09-03 RX ORDER — SODIUM BICARBONATE 650 MG/1
1300 TABLET ORAL 2 TIMES DAILY
Qty: 180 TABLET | Refills: 3 | COMMUNITY
Start: 2019-09-03 | End: 2019-09-30

## 2019-09-03 RX ORDER — MAGNESIUM OXIDE 400 MG/1
400 TABLET ORAL DAILY
Qty: 90 TABLET | Refills: 3 | Status: SHIPPED | OUTPATIENT
Start: 2019-09-03 | End: 2019-10-07

## 2019-09-03 ASSESSMENT — PAIN SCALES - GENERAL: PAINLEVEL: MILD PAIN (2)

## 2019-09-03 NOTE — TELEPHONE ENCOUNTER
Patient Call: Transplant Lab/Orders  Route to LPN  Post Transplant Days: 31  When patient is less than 60 days post-transplant, route high priority    Reason for Call: Annual lab reorder  Callback needed? No     May need updated lab order in her chart has lab appt 09/03/19 at 0930

## 2019-09-03 NOTE — LETTER
9/3/2019      RE: Mona Wagner  [address]  [place] [address]           ACUTE TRANSPLANT NEPHROLOGY VISIT    Assessment & Plan   # DCD: Stable   - Baseline Cr ~ TBD . Cr coming down 1.72( 8/30) --> 1.45 ( 9/3)    - Proteinuria: Not checked post transplant   - Date DSA Last Checked: Aug/2019      Latest DSA: No DSA at time of transplant   - BK Viremia: No   - Kidney Tx Biopsy: No    Recent UA 8/30 : Hematuria. On aspirin 81 mg daily . Renal Transplant US 8/30 - Normal . Continue to monitor       # Immunosuppression: Tacrolimus immediate release (goal 8-10) and Mycophenolate mofetil (goal 1-3.5)   - Changes: No    # Prophylaxis:   - PJP: Inhaled pentamidine   - CMV: Valcyte   - Thrush: None    # Hypertension: Controlled; Goal BP: < 140/90.Amlodipine  was discontinued following discharge Amlodipine 10 mg daily    - Volume status: Euvolemic   - Changes: No                -    # Anemia in Chronic Renal Disease: Hgb: Stable  Today  7.3   CHARLINE: No   - Iron studies: Replete : Iron sat 51 on 8/30              - will be referred to anemia management team     # Mineral Bone Disorder:   - Secondary renal hyperparathyroidism; PTH level: Mildly elevated (151-300 pg/ml). She is on Sinacalcet  - Vitamin D; level: Not checked recently.  Holding Vit D 2000 units daily  Supplement   - Calcium; level: Normal  - Phosphorus; level: Low      # Electrolytes:   - Potassium; level: Normal  - Magnesium; level: Low 1.4 - start replacement MgO 400 mg daily  - Bicarbonate; level: Normal,  Patient on sodium bicarb 1300 mg TID - she takes BID instead of TID   On Florinef - reduce to daily dose       # Skin Cancer Risk:    - Discussed sun protection and recommend regular follow up with Dermatology.    # Facial papular rash : new onset , non itchy . Likely prednisone induced . If no resolution , consider dermatology consult     # Medical Compliance: Yes    # Transplant History:  Etiology of kidney failure: IgA nephropathy  Tx:  "DCD  Transplant: 8/3/2019 (Kidney)  Donor Type:  - Cardiac Death Donor Class: Standard Criteria Donor  Crossmatch at time of Tx: negative  DSA at time of Tx: No  Significant changes in immunosuppression: None  CMV IgG Ab High Risk Discordance (D+/R-): No  EBV IgG Ab High Risk Discordance (D+/R-): No  Significant transplant-related complications: None  Transplant Office Phone Number: [phone]    Assessment and plan was discussed with the patient and she voiced her understanding and agreement.    Return visit: Return for as previously scheduled.    Eugenio Long MD    Chief Complaint   Ms. Wagner is a 28 year old here for kidney transplant.     History of Present Illness   Mona Wagner is a 28 year old female with a history of CKD stage III status post right kidney transplant 27 days ago (August 3) who presents to the Emergency Department today for evaluation of hematuria and flank pain.       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   Transplant coordinator Laurence \"Melani\"Taylor  [phone]  Donor type:  DCD  DSA at time of transplant:  NO  Ureteral stent: YES  CMV:  Donor + / Recipient +  EBV:  Donor - / Recipient +  Thymoglobulin:  325mg, 6mg/kg    She underwent DDKT (DCD) short warm ischemia time 3 vessels on a common patch.   Post engraftment ultrasound showed patent vasculature.   Had delayed graft function. Required dialysis on POD 0, 1, 4, 6. Renogram on 8/10 consistent with ATN. Urine volumes increased greatly in the days prior to discharge.    Immunosuppression:  Induction immunosuppression with thymoglobulin 325mg (6mg/kg) and steroid taper 500mg intra-op/250mg/100mg. Maintenance immunosuppression with tacrolimus 5mg BID and mycophenolate 750 mg BID. Tacrolimus level 5.1 . . Infectious prophylaxis with valganciclovir (x12 weeks) and ordered pentamidine neb (d/c'd dapsone 2/2 methemoglobinemia).    : presented to ER with hematuria and flank pain, Associated symptoms  " dysuria, urgency, bladder pressure, and right flank pain. UA showed large amount of blood. Albumin 10 , wbc 6, leucocytes small     C/O Rash in her face over the last 2 weeks , non itchy       Recent Hospitalizations:  [] No [x] Yes    New Medical Issues: [] No [x] Yes rash   Decreased energy: [x] No [] Yes    Chest pain or SOB with exertion:  [x] No [] Yes    Appetite change or weight change: [x] No [] Yes    Nausea, vomiting or diarrhea:  [x] No [] Yes    Fever, sweats or chills: [x] No [] Yes    Leg swelling: [x] No [] Yes      Home BP: checks every other day - around 115/78    Review of Systems   A comprehensive review of systems was obtained and negative, except as noted in the HPI or PMH.    Problem List   Patient Active Problem List   Diagnosis     DVT of upper extremity (deep vein thrombosis) (H)     Seizure disorder (H)     HTN, kidney transplant related     Other general symptoms(780.99)     Galactorrhea     Acquired hypothyroidism     Anemia in chronic kidney disease     Increased prolactin level (H)     Normocytic anemia     Thrombocytopenia (H)     Infective endocarditis     Aftercare following organ transplant     Immunosuppression (H)     Delayed graft function of kidney     Methemoglobinemia     Kidney replaced by transplant     Anxiety     Secondary renal hyperparathyroidism (H)     Vitamin D deficiency     Acute kidney failure, unspecified (H)     CKD (chronic kidney disease) stage 3, GFR 30-59 ml/min (H)     Anemia of chronic renal failure, stage 3 (moderate) (H)       Social History   Social History     Tobacco Use     Smoking status: Never Smoker     Smokeless tobacco: Never Used   Substance Use Topics     Alcohol use: No     Alcohol/week: 0.0 oz     Drug use: No       Allergies   Allergies   Allergen Reactions     Chlorhexidine Rash     Rash at site     Sulfa Drugs Rash     Muscle stiffness of neck     Dapsone      Methemoglobinemia     Furosemide Other (See Comments)     Skin flushing      Lisinopril Swelling     angioedema       Medications   Current Outpatient Medications   Medication Sig     acetaminophen (TYLENOL) 325 MG tablet Take 2 tablets (650 mg) by mouth every 4 hours as needed for mild pain     amLODIPine (NORVASC) 10 MG tablet Take 0.5 tablets (5 mg) by mouth daily     aspirin (ASA) 81 MG chewable tablet Take 1 tablet (81 mg) by mouth daily     atorvastatin (LIPITOR) 10 MG tablet Take 1 tablet (10 mg) by mouth daily     cinacalcet (SENSIPAR) 30 MG tablet Take 1 tablet (30 mg) by mouth daily     diphenhydrAMINE (BENADRYL) 25 MG tablet Take 1-2 tablets (25-50 mg) by mouth every 6 hours as needed for itching or allergies     EPINEPHrine (EPIPEN/ADRENACLICK/OR ANY BX GENERIC EQUIV) 0.3 MG/0.3ML injection 2-pack Inject 0.3 mLs (0.3 mg) into the muscle as needed for anaphylaxis     fludrocortisone (FLORINEF) 0.1 MG tablet Take 1 tablet (0.1 mg) by mouth 2 times daily     hydrOXYzine (ATARAX) 10 MG tablet Take 1 tablet (10 mg) by mouth 3 times daily as needed for anxiety     lactobacillus rhamnosus, GG, (CULTURELL) capsule Take 1 capsule by mouth 2 times daily     levothyroxine (SYNTHROID/LEVOTHROID) 25 MCG tablet Take 1 tablet (25 mcg) Tuesday, Thursday, Saturday and Sunday and 1.5 tablets (37.5 mcg) on Monday, Wednesday and Friday every week.     multivitamin RENAL (NEPHROCAPS/TRIPHROCAPS) 1 MG capsule Take 1 capsule by mouth daily     MYFORTIC (BRAND) 180 MG EC tablet Take 3 tablets (540 mg) by mouth 2 times daily     norethindrone (MICRONOR) 0.35 MG tablet Do not restart until your follow up appointment with your surgeon. Discuss restart date at that appointment.     ondansetron (ZOFRAN ODT) 4 MG ODT tab Take 1-2 tablets (4-8 mg) by mouth every 8 hours as needed for nausea     pentamidine (NEBUPENT) 300 MG neb solution Inhale 300 mg into the lungs every 28 days     phenylephrine-shark liver oil-mineral oil-petrolatum (PREPARATION H) 0.25-3-14-71.9 % rectal ointment Place rectally 2 times  daily as needed for hemorrhoids     psyllium (METAMUCIL/KONSYL) capsule Take 1 capsule by mouth daily     simethicone (MYLICON) 125 MG chewable tablet Take 1 tablet (125 mg) by mouth 4 times daily as needed for intestinal gas     sodium bicarbonate 650 MG tablet Take 2 tablets (1,300 mg) by mouth 3 times daily     tacrolimus (GENERIC EQUIVALENT) 0.5 MG capsule Take 1 cap by mouth twice daily as directed by Transplant Center for dose adjustment     tacrolimus (GENERIC EQUIVALENT) 5 MG capsule Take 2 capsules (10 mg) by mouth 2 times daily     valGANciclovir (VALCYTE) 450 MG tablet Take 1 tablet (450 mg) by mouth every other day     vitamin D3 (CHOLECALCIFEROL) 2000 units (50 mcg) tablet Take 1 tablet (2,000 Units) by mouth daily     No current facility-administered medications for this visit.      There are no discontinued medications.    Physical Exam   Vital Signs: There were no vitals taken for this visit.    GENERAL APPEARANCE: alert and no distress  HENT: mouth without ulcers or lesions  LYMPHATICS: no cervical or supraclavicular nodes  RESP: lungs clear to auscultation - no rales, rhonchi or wheezes  CV: regular rhythm, normal rate, no rub, no murmur  EDEMA: no LE edema bilaterally  ABDOMEN: soft, nondistended, nontender, bowel sounds normal  MS: extremities normal - no gross deformities noted, no evidence of inflammation in joints, no muscle tenderness  SKIN: non itchy papular rash in the face only   TX KIDNEY: normal  DIALYSIS ACCESS:  LUE AV Fistula normal bruit and thrill    Data     Renal Latest Ref Rng & Units 8/30/2019 8/30/2019 8/29/2019   Na 133 - 144 mmol/L 141 140 -   Na (external) 136 - 145 mmol/L - - 139   K 3.4 - 5.3 mmol/L 4.7 5.3 -   K (external) 3.5 - 5.0 mmol/L - - 5.9(H)   Cl 94 - 109 mmol/L 117(H) 116(H) -   Cl (external) 98 - 107 mmol/L - - 116(H)   CO2 20 - 32 mmol/L 19(L) 20 -   CO2 (external) 22 - 31 mmol/L - - 16(L)   BUN 7 - 30 mg/dL 30 30 -   BUN (external) 8 - 22 mg/dL - - 25(H)   Cr  0.52 - 1.04 mg/dL 1.72(H) 1.65(H) -   Cr (external) 0.60 - 1.10 mg/dL - - 1.72(H)   Glucose 70 - 99 mg/dL 130(H) 97 -   Glucose (external) 70 - 125 mg/dL - - 79   Ca  8.5 - 10.1 mg/dL 9.8 10.2(H) -   Ca (external) 8.5 - 10.5 mg/dL - - 10.3   Mg 1.6 - 2.3 mg/dL - - -   Mg (external) 1.8 - 2.6 mg/dL - - -     Bone Health Latest Ref Rng & Units 8/27/2019 8/19/2019 8/18/2019   Phos 2.5 - 4.5 mg/dL - - 3.2   Phos (external) 2.5 - 4.5 mg/dL 1.9(L) 3.4 -   PTHi 18 - 80 pg/mL - - -   PTHi (external) 18 - 80 pg/mL - - -   Vit D Def 20 - 75 ug/L - - -     Heme Latest Ref Rng & Units 8/30/2019 8/30/2019 8/29/2019   WBC 4.0 - 11.0 10e9/L 4.2 - -   WBC (external) 4.0 - 11.0 thou/ul - - 3.4(L)   Hgb 11.7 - 15.7 g/dL 7.1(L) 8.1(L) -   Hgb (external) 12.0 - 16.0 g/dL - - 7.7(L)   Plt 150 - 450 10e9/L 262 - -   Plt (external) 140 - 440 thou/ul - - 303     Liver Latest Ref Rng & Units 8/30/2019 8/2/2019 1/15/2019   AP 40 - 150 U/L 167(H) 65 -   AP (external) 34 - 104 U/L - - -   TBili 0.2 - 1.3 mg/dL 0.3 0.8 0.9   ALT 0 - 50 U/L 16 13 -   ALT (external) 7 - 52 U/L - - -   AST 0 - 45 U/L <3 4 -   AST (external) 13 - 39 U/L - - -   Tot Protein 6.8 - 8.8 g/dL 7.2 8.6 7.3   Tot Protein (external) 6.4 - 8.9 g/dL - - -   Albumin 3.4 - 5.0 g/dL 3.9 4.2 3.5   Albumin (external) - - - -     Pancreas Latest Ref Rng & Units 8/3/2019 8/2/2019   A1C 0 - 5.6 % 4.8 4.8     Iron studies Latest Ref Rng & Units 8/30/2019 8/12/2019 7/9/2018   Iron 35 - 180 ug/dL 138 96 -   Iron sat 15 - 46 % 51(H) 59(H) -   Ferritin 12 - 150 ng/mL 1,746(H) - -   Ferritin (external) 10 - 291 ng/mL - - 1,557(H)     [facility] Txp Virology 12/12/2013 12/11/2013   Hep B Surf 34.4 30.6        Recent Labs   Lab Test 08/15/19  0725 08/16/19  0807 08/18/19  0720   DOSTAC Not Provided Not Provided Not Provided   TACROL 8.1 6.1 5.8     Recent Labs   Lab Test 08/16/19  0807   DOSMPA Not Provided   MPACID 1.92   MPAG 149.2*       Early Post Transplant

## 2019-09-03 NOTE — PROGRESS NOTES
ACUTE TRANSPLANT NEPHROLOGY VISIT    Assessment & Plan   # DCD: Stable   - Baseline Cr ~ TBD . Cr coming down 1.72( ) --> 1.45 ( 9/3)    - Proteinuria: Not checked post transplant   - Date DSA Last Checked: Aug/2019      Latest DSA: No DSA at time of transplant   - BK Viremia: No   - Kidney Tx Biopsy: No    Recent UA  : Hematuria. On aspirin 81 mg daily . Renal Transplant US  - Normal . Continue to monitor     # Immunosuppression: Tacrolimus immediate release (goal 8-10) and Mycophenolate mofetil (goal 1-3.5)   - Changes: No    # Prophylaxis:   - PJP: Inhaled pentamidine   - CMV: Valcyte   - Thrush: None    # Hypertension: Controlled; Goal BP: < 140/90.Amlodipine  was discontinued following discharge Amlodipine 10 mg daily    - Volume status: Euvolemic   - Changes: No                -    # Anemia in Chronic Renal Disease: Hgb: Stable  Today  7.3   CHARLINE: No   - Iron studies: Replete : Iron sat 51 on               - will be referred to anemia management team     # Mineral Bone Disorder:   - Secondary renal hyperparathyroidism; PTH level: Mildly elevated (151-300 pg/ml). She is on Sinacalcet  - Vitamin D; level: Not checked recently.  Holding Vit D 2000 units daily  Supplement   - Calcium; level: Normal  - Phosphorus; level: Low    # Electrolytes:   - Potassium; level: Normal  - Magnesium; level: Low 1.4 - start replacement MgO 400 mg daily  - Bicarbonate; level: Normal,  Patient on sodium bicarb 1300 mg TID - she takes BID instead of TID   On Florinef - reduce to daily dose     # Skin Cancer Risk:    - Discussed sun protection and recommend regular follow up with Dermatology.    # Facial papular rash : new onset , non itchy . Likely prednisone induced . If no resolution , consider dermatology consult     # Medical Compliance: Yes    # Transplant History:  Etiology of kidney failure: IgA nephropathy  Tx: DCD  Transplant: 8/3/2019 (Kidney)  Donor Type:  - Cardiac Death Donor Class: Standard  "Criteria Donor  Crossmatch at time of Tx: negative  DSA at time of Tx: No  Significant changes in immunosuppression: None  CMV IgG Ab High Risk Discordance (D+/R-): No  EBV IgG Ab High Risk Discordance (D+/R-): No  Significant transplant-related complications: None  Transplant Office Phone Number: [phone]    Assessment and plan was discussed with the patient and she voiced her understanding and agreement.    Return visit: Return for as previously scheduled.    Eugenio Long MD     Attestation:  This patient has been seen and evaluated by me, Nakul Hill MD.  I have reviewed the note and agree with plan of care as documented by the fellow.       Chief Complaint   Ms. Wagner is a 28 year old here for kidney transplant.     History of Present Illness   Mona Wagner is a 28 year old female with a history of CKD stage III status post right kidney transplant 27 days ago (August 3) who presents to the Emergency Department today for evaluation of hematuria and flank pain.     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   Transplant coordinator Laurence \"Candice Vazquez  [phone]  Donor type:  DCD  DSA at time of transplant:  NO  Ureteral stent: YES  CMV:  Donor + / Recipient +  EBV:  Donor - / Recipient +  Thymoglobulin:  325mg, 6mg/kg    She underwent DDKT (DCD) short warm ischemia time 3 vessels on a common patch.   Post engraftment ultrasound showed patent vasculature.   Had delayed graft function. Required dialysis on POD 0, 1, 4, 6. Renogram on 8/10 consistent with ATN. Urine volumes increased greatly in the days prior to discharge.    Immunosuppression:  Induction immunosuppression with thymoglobulin 325mg (6mg/kg) and steroid taper 500mg intra-op/250mg/100mg. Maintenance immunosuppression with tacrolimus 5mg BID and mycophenolate 750 mg BID. Tacrolimus level 5.1 8/11. . Infectious prophylaxis with valganciclovir (x12 weeks) and ordered pentamidine neb (d/c'd dapsone 2/2 " methemoglobinemia).    8/30: presented to ER with hematuria and flank pain, Associated symptoms  dysuria, urgency, bladder pressure, and right flank pain. UA showed large amount of blood. Albumin 10 , wbc 6, leucocytes small     C/O Rash in her face over the last 2 weeks , non itchy       Recent Hospitalizations:  [] No [x] Yes    New Medical Issues: [] No [x] Yes rash   Decreased energy: [x] No [] Yes    Chest pain or SOB with exertion:  [x] No [] Yes    Appetite change or weight change: [x] No [] Yes    Nausea, vomiting or diarrhea:  [x] No [] Yes    Fever, sweats or chills: [x] No [] Yes    Leg swelling: [x] No [] Yes      Home BP: checks every other day - around 115/78    Review of Systems   A comprehensive review of systems was obtained and negative, except as noted in the HPI or PMH.    Problem List   Patient Active Problem List   Diagnosis     DVT of upper extremity (deep vein thrombosis) (H)     Seizure disorder (H)     HTN, kidney transplant related     Other general symptoms(780.99)     Galactorrhea     Acquired hypothyroidism     Anemia in chronic kidney disease     Increased prolactin level (H)     Normocytic anemia     Thrombocytopenia (H)     Infective endocarditis     Aftercare following organ transplant     Immunosuppression (H)     Delayed graft function of kidney     Methemoglobinemia     Kidney replaced by transplant     Anxiety     Secondary renal hyperparathyroidism (H)     Vitamin D deficiency     Acute kidney failure, unspecified (H)     CKD (chronic kidney disease) stage 3, GFR 30-59 ml/min (H)     Anemia of chronic renal failure, stage 3 (moderate) (H)       Social History   Social History     Tobacco Use     Smoking status: Never Smoker     Smokeless tobacco: Never Used   Substance Use Topics     Alcohol use: No     Alcohol/week: 0.0 oz     Drug use: No       Allergies   Allergies   Allergen Reactions     Chlorhexidine Rash     Rash at site     Sulfa Drugs Rash     Muscle stiffness of neck      Dapsone      Methemoglobinemia     Furosemide Other (See Comments)     Skin flushing     Lisinopril Swelling     angioedema       Medications   Current Outpatient Medications   Medication Sig     acetaminophen (TYLENOL) 325 MG tablet Take 2 tablets (650 mg) by mouth every 4 hours as needed for mild pain     amLODIPine (NORVASC) 10 MG tablet Take 0.5 tablets (5 mg) by mouth daily     aspirin (ASA) 81 MG chewable tablet Take 1 tablet (81 mg) by mouth daily     atorvastatin (LIPITOR) 10 MG tablet Take 1 tablet (10 mg) by mouth daily     cinacalcet (SENSIPAR) 30 MG tablet Take 1 tablet (30 mg) by mouth daily     diphenhydrAMINE (BENADRYL) 25 MG tablet Take 1-2 tablets (25-50 mg) by mouth every 6 hours as needed for itching or allergies     EPINEPHrine (EPIPEN/ADRENACLICK/OR ANY BX GENERIC EQUIV) 0.3 MG/0.3ML injection 2-pack Inject 0.3 mLs (0.3 mg) into the muscle as needed for anaphylaxis     fludrocortisone (FLORINEF) 0.1 MG tablet Take 1 tablet (0.1 mg) by mouth 2 times daily     hydrOXYzine (ATARAX) 10 MG tablet Take 1 tablet (10 mg) by mouth 3 times daily as needed for anxiety     lactobacillus rhamnosus, GG, (CULTURELL) capsule Take 1 capsule by mouth 2 times daily     levothyroxine (SYNTHROID/LEVOTHROID) 25 MCG tablet Take 1 tablet (25 mcg) Tuesday, Thursday, Saturday and Sunday and 1.5 tablets (37.5 mcg) on Monday, Wednesday and Friday every week.     multivitamin RENAL (NEPHROCAPS/TRIPHROCAPS) 1 MG capsule Take 1 capsule by mouth daily     MYFORTIC (BRAND) 180 MG EC tablet Take 3 tablets (540 mg) by mouth 2 times daily     norethindrone (MICRONOR) 0.35 MG tablet Do not restart until your follow up appointment with your surgeon. Discuss restart date at that appointment.     ondansetron (ZOFRAN ODT) 4 MG ODT tab Take 1-2 tablets (4-8 mg) by mouth every 8 hours as needed for nausea     pentamidine (NEBUPENT) 300 MG neb solution Inhale 300 mg into the lungs every 28 days     phenylephrine-shark liver  oil-mineral oil-petrolatum (PREPARATION H) 0.25-3-14-71.9 % rectal ointment Place rectally 2 times daily as needed for hemorrhoids     psyllium (METAMUCIL/KONSYL) capsule Take 1 capsule by mouth daily     simethicone (MYLICON) 125 MG chewable tablet Take 1 tablet (125 mg) by mouth 4 times daily as needed for intestinal gas     sodium bicarbonate 650 MG tablet Take 2 tablets (1,300 mg) by mouth 3 times daily     tacrolimus (GENERIC EQUIVALENT) 0.5 MG capsule Take 1 cap by mouth twice daily as directed by Transplant Center for dose adjustment     tacrolimus (GENERIC EQUIVALENT) 5 MG capsule Take 2 capsules (10 mg) by mouth 2 times daily     valGANciclovir (VALCYTE) 450 MG tablet Take 1 tablet (450 mg) by mouth every other day     vitamin D3 (CHOLECALCIFEROL) 2000 units (50 mcg) tablet Take 1 tablet (2,000 Units) by mouth daily     No current facility-administered medications for this visit.      There are no discontinued medications.    Physical Exam   Vital Signs: There were no vitals taken for this visit.    GENERAL APPEARANCE: alert and no distress  HENT: mouth without ulcers or lesions  LYMPHATICS: no cervical or supraclavicular nodes  RESP: lungs clear to auscultation - no rales, rhonchi or wheezes  CV: regular rhythm, normal rate, no rub, no murmur  EDEMA: no LE edema bilaterally  ABDOMEN: soft, nondistended, nontender, bowel sounds normal  MS: extremities normal - no gross deformities noted, no evidence of inflammation in joints, no muscle tenderness  SKIN: non itchy papular rash in the face only   TX KIDNEY: normal  DIALYSIS ACCESS:  LUE AV Fistula normal bruit and thrill    Data     Renal Latest Ref Rng & Units 8/30/2019 8/30/2019 8/29/2019   Na 133 - 144 mmol/L 141 140 -   Na (external) 136 - 145 mmol/L - - 139   K 3.4 - 5.3 mmol/L 4.7 5.3 -   K (external) 3.5 - 5.0 mmol/L - - 5.9(H)   Cl 94 - 109 mmol/L 117(H) 116(H) -   Cl (external) 98 - 107 mmol/L - - 116(H)   CO2 20 - 32 mmol/L 19(L) 20 -   CO2  (external) 22 - 31 mmol/L - - 16(L)   BUN 7 - 30 mg/dL 30 30 -   BUN (external) 8 - 22 mg/dL - - 25(H)   Cr 0.52 - 1.04 mg/dL 1.72(H) 1.65(H) -   Cr (external) 0.60 - 1.10 mg/dL - - 1.72(H)   Glucose 70 - 99 mg/dL 130(H) 97 -   Glucose (external) 70 - 125 mg/dL - - 79   Ca  8.5 - 10.1 mg/dL 9.8 10.2(H) -   Ca (external) 8.5 - 10.5 mg/dL - - 10.3   Mg 1.6 - 2.3 mg/dL - - -   Mg (external) 1.8 - 2.6 mg/dL - - -     Bone Health Latest Ref Rng & Units 8/27/2019 8/19/2019 8/18/2019   Phos 2.5 - 4.5 mg/dL - - 3.2   Phos (external) 2.5 - 4.5 mg/dL 1.9(L) 3.4 -   PTHi 18 - 80 pg/mL - - -   PTHi (external) 18 - 80 pg/mL - - -   Vit D Def 20 - 75 ug/L - - -     Heme Latest Ref Rng & Units 8/30/2019 8/30/2019 8/29/2019   WBC 4.0 - 11.0 10e9/L 4.2 - -   WBC (external) 4.0 - 11.0 thou/ul - - 3.4(L)   Hgb 11.7 - 15.7 g/dL 7.1(L) 8.1(L) -   Hgb (external) 12.0 - 16.0 g/dL - - 7.7(L)   Plt 150 - 450 10e9/L 262 - -   Plt (external) 140 - 440 thou/ul - - 303     Liver Latest Ref Rng & Units 8/30/2019 8/2/2019 1/15/2019   AP 40 - 150 U/L 167(H) 65 -   AP (external) 34 - 104 U/L - - -   TBili 0.2 - 1.3 mg/dL 0.3 0.8 0.9   ALT 0 - 50 U/L 16 13 -   ALT (external) 7 - 52 U/L - - -   AST 0 - 45 U/L <3 4 -   AST (external) 13 - 39 U/L - - -   Tot Protein 6.8 - 8.8 g/dL 7.2 8.6 7.3   Tot Protein (external) 6.4 - 8.9 g/dL - - -   Albumin 3.4 - 5.0 g/dL 3.9 4.2 3.5   Albumin (external) - - - -     Pancreas Latest Ref Rng & Units 8/3/2019 8/2/2019   A1C 0 - 5.6 % 4.8 4.8     Iron studies Latest Ref Rng & Units 8/30/2019 8/12/2019 7/9/2018   Iron 35 - 180 ug/dL 138 96 -   Iron sat 15 - 46 % 51(H) 59(H) -   Ferritin 12 - 150 ng/mL 1,746(H) - -   Ferritin (external) 10 - 291 ng/mL - - 1,557(H)     [facility] Txp Virology 12/12/2013 12/11/2013   Hep B Surf 34.4 30.6        Recent Labs   Lab Test 08/15/19  0725 08/16/19  0807 08/18/19  0720   DOSTAC Not Provided Not Provided Not Provided   TACROL 8.1 6.1 5.8     Recent Labs   Lab Test 08/16/19  0807    DOSMPA Not Provided   MPACID 1.92   MPAG 149.2*

## 2019-09-03 NOTE — TELEPHONE ENCOUNTER
Lab and Aranesp orders faxed to [facility] [phone]    Manjula Mckinnon RN   Anemia Services  [address] [address]   [email or web address]   Office : [phone]  Fax: [phone]

## 2019-09-03 NOTE — PATIENT INSTRUCTIONS
Decreased Florinef to 0.1 mg daily.    Start magnesium oxide 400 mg daily.    Continue to eat high phosphorus foods and good fluid intake.

## 2019-09-03 NOTE — LETTER
9/3/2019       RE: Mona Wagner  [address]  [place] [address]     Dear Colleague,    Thank you for referring your patient, Mona Wagner, to the [facility] NEPHROLOGY at [facility]. Please see a copy of my visit note below.        ACUTE TRANSPLANT NEPHROLOGY VISIT    Assessment & Plan   # DCD: Stable   - Baseline Cr ~ TBD . Cr coming down 1.72( 8/30) --> 1.45 ( 9/3)    - Proteinuria: Not checked post transplant   - Date DSA Last Checked: Aug/2019      Latest DSA: No DSA at time of transplant   - BK Viremia: No   - Kidney Tx Biopsy: No    Recent UA 8/30 : Hematuria. On aspirin 81 mg daily . Renal Transplant US 8/30 - Normal . Continue to monitor       # Immunosuppression: Tacrolimus immediate release (goal 8-10) and Mycophenolate mofetil (goal 1-3.5)   - Changes: No    # Prophylaxis:   - PJP: Inhaled pentamidine   - CMV: Valcyte   - Thrush: None    # Hypertension: Controlled; Goal BP: < 140/90.Amlodipine  was discontinued following discharge Amlodipine 10 mg daily    - Volume status: Euvolemic   - Changes: No                -    # Anemia in Chronic Renal Disease: Hgb: Stable  Today  7.3   CHARLINE: No   - Iron studies: Replete : Iron sat 51 on 8/30              - will be referred to anemia management team     # Mineral Bone Disorder:   - Secondary renal hyperparathyroidism; PTH level: Mildly elevated (151-300 pg/ml). She is on Sinacalcet  - Vitamin D; level: Not checked recently.  Holding Vit D 2000 units daily  Supplement   - Calcium; level: Normal  - Phosphorus; level: Low      # Electrolytes:   - Potassium; level: Normal  - Magnesium; level: Low 1.4 - start replacement MgO 400 mg daily  - Bicarbonate; level: Normal,  Patient on sodium bicarb 1300 mg TID - she takes BID instead of TID   On Florinef - reduce to daily dose       # Skin Cancer Risk:    - Discussed sun protection and recommend regular follow up with Dermatology.    # Facial papular rash : new onset , non itchy .  "Likely prednisone induced . If no resolution , consider dermatology consult     # Medical Compliance: Yes    # Transplant History:  Etiology of kidney failure: IgA nephropathy  Tx: DCD  Transplant: 8/3/2019 (Kidney)  Donor Type:  - Cardiac Death Donor Class: Standard Criteria Donor  Crossmatch at time of Tx: negative  DSA at time of Tx: No  Significant changes in immunosuppression: None  CMV IgG Ab High Risk Discordance (D+/R-): No  EBV IgG Ab High Risk Discordance (D+/R-): No  Significant transplant-related complications: None  Transplant Office Phone Number: [phone]    Assessment and plan was discussed with the patient and she voiced her understanding and agreement.    Return visit: Return for as previously scheduled.    Eugenio Long MD    Chief Complaint   Ms. Wagner is a 28 year old here for kidney transplant.     History of Present Illness   Mona Wagner is a 28 year old female with a history of CKD stage III status post right kidney transplant 27 days ago (August 3) who presents to the Emergency Department today for evaluation of hematuria and flank pain.       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   Transplant coordinator Laurence \"Candice Vazquez  [phone]  Donor type:  DCD  DSA at time of transplant:  NO  Ureteral stent: YES  CMV:  Donor + / Recipient +  EBV:  Donor - / Recipient +  Thymoglobulin:  325mg, 6mg/kg    She underwent DDKT (DCD) short warm ischemia time 3 vessels on a common patch.   Post engraftment ultrasound showed patent vasculature.   Had delayed graft function. Required dialysis on POD 0, 1, 4, 6. Renogram on 8/10 consistent with ATN. Urine volumes increased greatly in the days prior to discharge.    Immunosuppression:  Induction immunosuppression with thymoglobulin 325mg (6mg/kg) and steroid taper 500mg intra-op/250mg/100mg. Maintenance immunosuppression with tacrolimus 5mg BID and mycophenolate 750 mg BID. Tacrolimus level 5.1 . . Infectious prophylaxis " with valganciclovir (x12 weeks) and ordered pentamidine neb (d/c'd dapsone 2/2 methemoglobinemia).    8/30: presented to ER with hematuria and flank pain, Associated symptoms  dysuria, urgency, bladder pressure, and right flank pain. UA showed large amount of blood. Albumin 10 , wbc 6, leucocytes small     C/O Rash in her face over the last 2 weeks , non itchy       Recent Hospitalizations:  [] No [x] Yes    New Medical Issues: [] No [x] Yes rash   Decreased energy: [x] No [] Yes    Chest pain or SOB with exertion:  [x] No [] Yes    Appetite change or weight change: [x] No [] Yes    Nausea, vomiting or diarrhea:  [x] No [] Yes    Fever, sweats or chills: [x] No [] Yes    Leg swelling: [x] No [] Yes      Home BP: checks every other day - around 115/78    Review of Systems   A comprehensive review of systems was obtained and negative, except as noted in the HPI or PMH.    Problem List   Patient Active Problem List   Diagnosis     DVT of upper extremity (deep vein thrombosis) (H)     Seizure disorder (H)     HTN, kidney transplant related     Other general symptoms(780.99)     Galactorrhea     Acquired hypothyroidism     Anemia in chronic kidney disease     Increased prolactin level (H)     Normocytic anemia     Thrombocytopenia (H)     Infective endocarditis     Aftercare following organ transplant     Immunosuppression (H)     Delayed graft function of kidney     Methemoglobinemia     Kidney replaced by transplant     Anxiety     Secondary renal hyperparathyroidism (H)     Vitamin D deficiency     Acute kidney failure, unspecified (H)     CKD (chronic kidney disease) stage 3, GFR 30-59 ml/min (H)     Anemia of chronic renal failure, stage 3 (moderate) (H)       Social History   Social History     Tobacco Use     Smoking status: Never Smoker     Smokeless tobacco: Never Used   Substance Use Topics     Alcohol use: No     Alcohol/week: 0.0 oz     Drug use: No       Allergies   Allergies   Allergen Reactions      Chlorhexidine Rash     Rash at site     Sulfa Drugs Rash     Muscle stiffness of neck     Dapsone      Methemoglobinemia     Furosemide Other (See Comments)     Skin flushing     Lisinopril Swelling     angioedema       Medications   Current Outpatient Medications   Medication Sig     acetaminophen (TYLENOL) 325 MG tablet Take 2 tablets (650 mg) by mouth every 4 hours as needed for mild pain     amLODIPine (NORVASC) 10 MG tablet Take 0.5 tablets (5 mg) by mouth daily     aspirin (ASA) 81 MG chewable tablet Take 1 tablet (81 mg) by mouth daily     atorvastatin (LIPITOR) 10 MG tablet Take 1 tablet (10 mg) by mouth daily     cinacalcet (SENSIPAR) 30 MG tablet Take 1 tablet (30 mg) by mouth daily     diphenhydrAMINE (BENADRYL) 25 MG tablet Take 1-2 tablets (25-50 mg) by mouth every 6 hours as needed for itching or allergies     EPINEPHrine (EPIPEN/ADRENACLICK/OR ANY BX GENERIC EQUIV) 0.3 MG/0.3ML injection 2-pack Inject 0.3 mLs (0.3 mg) into the muscle as needed for anaphylaxis     fludrocortisone (FLORINEF) 0.1 MG tablet Take 1 tablet (0.1 mg) by mouth 2 times daily     hydrOXYzine (ATARAX) 10 MG tablet Take 1 tablet (10 mg) by mouth 3 times daily as needed for anxiety     lactobacillus rhamnosus, GG, (CULTURELL) capsule Take 1 capsule by mouth 2 times daily     levothyroxine (SYNTHROID/LEVOTHROID) 25 MCG tablet Take 1 tablet (25 mcg) Tuesday, Thursday, Saturday and Sunday and 1.5 tablets (37.5 mcg) on Monday, Wednesday and Friday every week.     multivitamin RENAL (NEPHROCAPS/TRIPHROCAPS) 1 MG capsule Take 1 capsule by mouth daily     MYFORTIC (BRAND) 180 MG EC tablet Take 3 tablets (540 mg) by mouth 2 times daily     norethindrone (MICRONOR) 0.35 MG tablet Do not restart until your follow up appointment with your surgeon. Discuss restart date at that appointment.     ondansetron (ZOFRAN ODT) 4 MG ODT tab Take 1-2 tablets (4-8 mg) by mouth every 8 hours as needed for nausea     pentamidine (NEBUPENT) 300 MG neb  solution Inhale 300 mg into the lungs every 28 days     phenylephrine-shark liver oil-mineral oil-petrolatum (PREPARATION H) 0.25-3-14-71.9 % rectal ointment Place rectally 2 times daily as needed for hemorrhoids     psyllium (METAMUCIL/KONSYL) capsule Take 1 capsule by mouth daily     simethicone (MYLICON) 125 MG chewable tablet Take 1 tablet (125 mg) by mouth 4 times daily as needed for intestinal gas     sodium bicarbonate 650 MG tablet Take 2 tablets (1,300 mg) by mouth 3 times daily     tacrolimus (GENERIC EQUIVALENT) 0.5 MG capsule Take 1 cap by mouth twice daily as directed by Transplant Center for dose adjustment     tacrolimus (GENERIC EQUIVALENT) 5 MG capsule Take 2 capsules (10 mg) by mouth 2 times daily     valGANciclovir (VALCYTE) 450 MG tablet Take 1 tablet (450 mg) by mouth every other day     vitamin D3 (CHOLECALCIFEROL) 2000 units (50 mcg) tablet Take 1 tablet (2,000 Units) by mouth daily     No current facility-administered medications for this visit.      There are no discontinued medications.    Physical Exam   Vital Signs: There were no vitals taken for this visit.    GENERAL APPEARANCE: alert and no distress  HENT: mouth without ulcers or lesions  LYMPHATICS: no cervical or supraclavicular nodes  RESP: lungs clear to auscultation - no rales, rhonchi or wheezes  CV: regular rhythm, normal rate, no rub, no murmur  EDEMA: no LE edema bilaterally  ABDOMEN: soft, nondistended, nontender, bowel sounds normal  MS: extremities normal - no gross deformities noted, no evidence of inflammation in joints, no muscle tenderness  SKIN: non itchy papular rash in the face only   TX KIDNEY: normal  DIALYSIS ACCESS:  LUE AV Fistula normal bruit and thrill    Data     Renal Latest Ref Rng & Units 8/30/2019 8/30/2019 8/29/2019   Na 133 - 144 mmol/L 141 140 -   Na (external) 136 - 145 mmol/L - - 139   K 3.4 - 5.3 mmol/L 4.7 5.3 -   K (external) 3.5 - 5.0 mmol/L - - 5.9(H)   Cl 94 - 109 mmol/L 117(H) 116(H) -   Cl  (external) 98 - 107 mmol/L - - 116(H)   CO2 20 - 32 mmol/L 19(L) 20 -   CO2 (external) 22 - 31 mmol/L - - 16(L)   BUN 7 - 30 mg/dL 30 30 -   BUN (external) 8 - 22 mg/dL - - 25(H)   Cr 0.52 - 1.04 mg/dL 1.72(H) 1.65(H) -   Cr (external) 0.60 - 1.10 mg/dL - - 1.72(H)   Glucose 70 - 99 mg/dL 130(H) 97 -   Glucose (external) 70 - 125 mg/dL - - 79   Ca  8.5 - 10.1 mg/dL 9.8 10.2(H) -   Ca (external) 8.5 - 10.5 mg/dL - - 10.3   Mg 1.6 - 2.3 mg/dL - - -   Mg (external) 1.8 - 2.6 mg/dL - - -     Bone Health Latest Ref Rng & Units 8/27/2019 8/19/2019 8/18/2019   Phos 2.5 - 4.5 mg/dL - - 3.2   Phos (external) 2.5 - 4.5 mg/dL 1.9(L) 3.4 -   PTHi 18 - 80 pg/mL - - -   PTHi (external) 18 - 80 pg/mL - - -   Vit D Def 20 - 75 ug/L - - -     Heme Latest Ref Rng & Units 8/30/2019 8/30/2019 8/29/2019   WBC 4.0 - 11.0 10e9/L 4.2 - -   WBC (external) 4.0 - 11.0 thou/ul - - 3.4(L)   Hgb 11.7 - 15.7 g/dL 7.1(L) 8.1(L) -   Hgb (external) 12.0 - 16.0 g/dL - - 7.7(L)   Plt 150 - 450 10e9/L 262 - -   Plt (external) 140 - 440 thou/ul - - 303     Liver Latest Ref Rng & Units 8/30/2019 8/2/2019 1/15/2019   AP 40 - 150 U/L 167(H) 65 -   AP (external) 34 - 104 U/L - - -   TBili 0.2 - 1.3 mg/dL 0.3 0.8 0.9   ALT 0 - 50 U/L 16 13 -   ALT (external) 7 - 52 U/L - - -   AST 0 - 45 U/L <3 4 -   AST (external) 13 - 39 U/L - - -   Tot Protein 6.8 - 8.8 g/dL 7.2 8.6 7.3   Tot Protein (external) 6.4 - 8.9 g/dL - - -   Albumin 3.4 - 5.0 g/dL 3.9 4.2 3.5   Albumin (external) - - - -     Pancreas Latest Ref Rng & Units 8/3/2019 8/2/2019   A1C 0 - 5.6 % 4.8 4.8     Iron studies Latest Ref Rng & Units 8/30/2019 8/12/2019 7/9/2018   Iron 35 - 180 ug/dL 138 96 -   Iron sat 15 - 46 % 51(H) 59(H) -   Ferritin 12 - 150 ng/mL 1,746(H) - -   Ferritin (external) 10 - 291 ng/mL - - 1,557(H)     [facility] Txp Virology 12/12/2013 12/11/2013   Hep B Surf 34.4 30.6        Recent Labs   Lab Test 08/15/19  0725 08/16/19  0807 08/18/19  0720   DOSTAC Not Provided Not Provided Not  Provided   TACROL 8.1 6.1 5.8     Recent Labs   Lab Test 08/16/19  0807   DOSMPA Not Provided   MPACID 1.92   MPAG 149.2*       Again, thank you for allowing me to participate in the care of your patient.      Sincerely,    Early Post Transplant

## 2019-09-03 NOTE — NURSING NOTE
Chief Complaint   Patient presents with     RECHECK     1 mos post f/up     /84 (BP Location: Right arm, Patient Position: Sitting, Cuff Size: Adult Regular)   Pulse 91   Temp 97.9  F (36.6  C)   Wt 53.8 kg (118 lb 8 oz)   SpO2 100%   BMI 23.14 kg/m        Garrett Guajardo, EMT

## 2019-09-04 LAB
BKV DNA # SPEC NAA+PROBE: NORMAL COPIES/ML
BKV DNA SPEC NAA+PROBE-LOG#: NORMAL LOG COPIES/ML
DONOR IDENTIFICATION: NORMAL
DSA COMMENTS: NORMAL
DSA PRESENT: NO
DSA TEST METHOD: NORMAL
INTERFERING SUBST TEST METHOD: NORMAL
INTERFERING SUBSTANCE COMMENT: NORMAL
INTERFERING SUBSTANCE RESULT: NORMAL
INTERFERING SUBSTANCE: NORMAL
ORGAN: NORMAL
SA1 CELL: NORMAL
SA1 COMMENTS: NORMAL
SA1 HI RISK ABY: NORMAL
SA1 MOD RISK ABY: NORMAL
SA1 TEST METHOD: NORMAL
SA2 CELL: NORMAL
SA2 COMMENTS: NORMAL
SA2 HI RISK ABY UA: NORMAL
SA2 MOD RISK ABY: NORMAL
SA2 TEST METHOD: NORMAL
SPECIMEN SOURCE: NORMAL
TACROLIMUS BLD-MCNC: 9.2 UG/L (ref 5–15)
TACROLIMUS LAST DOSE: NORMAL
UNACCEPTABLE ANTIGEN: NORMAL
UNOS CPRA: 25

## 2019-09-05 ENCOUNTER — HOME CARE/HOSPICE - HEALTHEAST (OUTPATIENT)
Dept: HOME HEALTH SERVICES | Facility: HOME HEALTH | Age: 29
End: 2019-09-05

## 2019-09-05 ENCOUNTER — TELEPHONE (OUTPATIENT)
Dept: TRANSPLANT | Facility: CLINIC | Age: 29
End: 2019-09-05

## 2019-09-05 LAB
MYCOPHENOLATE SERPL LC/MS/MS-MCNC: 3.39 MG/L (ref 1–3.5)
MYCOPHENOLATE-G SERPL LC/MS/MS-MCNC: 52.8 MG/L (ref 30–95)
TME LAST DOSE: NORMAL H

## 2019-09-05 NOTE — TELEPHONE ENCOUNTER
Spoke to patient who would like to go to the [place] clinic for labs and discontinue home care.  Confirmed with patient that she is managing medications on her own, taking her own vital signs.  Patient has had issues with home care being able to draw her txp labs and would prefer to go to her own clinic for lab draws.

## 2019-09-05 NOTE — TELEPHONE ENCOUNTER
Provider Call: General  Route to LPN    Reason for call: Call from HHN- attempted for lab draw  Unable to get  blood  Pt is difficult stick  Should pt go to clinic for lab draws?      Call back needed? Yes    Return Call Needed  Same as documented in contacts section  When to return call?: Greater than one day: Route standard priority

## 2019-09-06 ENCOUNTER — TELEPHONE (OUTPATIENT)
Dept: TRANSPLANT | Facility: CLINIC | Age: 29
End: 2019-09-06

## 2019-09-06 NOTE — TELEPHONE ENCOUNTER
Provider Call: Transplant Provider  Route to RN  Facility Name: [facility] # [phone]    Reason for Call: verbal to stop Home care-lab draw  Callback needed? No

## 2019-09-09 ENCOUNTER — TELEPHONE (OUTPATIENT)
Dept: PHARMACY | Facility: CLINIC | Age: 29
End: 2019-09-09

## 2019-09-09 DIAGNOSIS — N18.30 ANEMIA OF CHRONIC RENAL FAILURE, STAGE 3 (MODERATE) (H): ICD-10-CM

## 2019-09-09 DIAGNOSIS — N17.9 ACUTE KIDNEY FAILURE, UNSPECIFIED (H): Primary | ICD-10-CM

## 2019-09-09 DIAGNOSIS — Z79.899 LONG TERM USE OF DRUG: ICD-10-CM

## 2019-09-09 DIAGNOSIS — D63.1 ANEMIA OF CHRONIC RENAL FAILURE, STAGE 3 (MODERATE) (H): ICD-10-CM

## 2019-09-09 DIAGNOSIS — N18.30 CKD (CHRONIC KIDNEY DISEASE) STAGE 3, GFR 30-59 ML/MIN (H): ICD-10-CM

## 2019-09-09 DIAGNOSIS — Z94.0 KIDNEY REPLACED BY TRANSPLANT: ICD-10-CM

## 2019-09-09 LAB
ALLOSURE DD-CFDNA: 2.2 %
ANION GAP SERPL CALCULATED.3IONS-SCNC: 6 MMOL/L (ref 3–14)
BUN SERPL-MCNC: 22 MG/DL (ref 7–30)
CALCIUM SERPL-MCNC: 10.2 MG/DL (ref 8.5–10.1)
CHLORIDE SERPL-SCNC: 113 MMOL/L (ref 94–109)
CO2 SERPL-SCNC: 21 MMOL/L (ref 20–32)
CREAT SERPL-MCNC: 1.11 MG/DL (ref 0.52–1.04)
GFR SERPL CREATININE-BSD FRML MDRD: 68 ML/MIN/{1.73_M2}
GLUCOSE SERPL-MCNC: 89 MG/DL (ref 70–99)
HCT VFR BLD AUTO: 25 % (ref 35–47)
HGB BLD-MCNC: 7.8 G/DL (ref 11.7–15.7)
MAGNESIUM SERPL-MCNC: 1.4 MG/DL (ref 1.6–2.3)
PHOSPHATE SERPL-MCNC: 1.7 MG/DL (ref 2.5–4.5)
POTASSIUM SERPL-SCNC: 4.3 MMOL/L (ref 3.4–5.3)
SODIUM SERPL-SCNC: 140 MMOL/L (ref 133–144)
TACROLIMUS BLD-MCNC: 10 UG/L (ref 5–15)
TME LAST DOSE: NORMAL H

## 2019-09-09 PROCEDURE — 83735 ASSAY OF MAGNESIUM: CPT | Performed by: INTERNAL MEDICINE

## 2019-09-09 PROCEDURE — 85014 HEMATOCRIT: CPT | Performed by: INTERNAL MEDICINE

## 2019-09-09 PROCEDURE — 80197 ASSAY OF TACROLIMUS: CPT | Performed by: INTERNAL MEDICINE

## 2019-09-09 PROCEDURE — 80048 BASIC METABOLIC PNL TOTAL CA: CPT | Performed by: INTERNAL MEDICINE

## 2019-09-09 PROCEDURE — 85018 HEMOGLOBIN: CPT | Performed by: INTERNAL MEDICINE

## 2019-09-09 PROCEDURE — 84100 ASSAY OF PHOSPHORUS: CPT | Performed by: INTERNAL MEDICINE

## 2019-09-09 NOTE — TELEPHONE ENCOUNTER
Follow-up with anemia management service:    Spoke with Reilly Dunn was never scheduled with [place] [facility]  She would like to get it at the [facility] on 9/16/2019. Labs will be drawn 9/12/2019.     Called the [facility] and scheduled her an appt at 11:00am    Anemia Latest Ref Rng & Units 8/15/2019 8/16/2019 8/18/2019 8/30/2019 8/30/2019 9/3/2019 9/9/2019   Hemoglobin 11.7 - 15.7 g/dL 8.7(L) 7.6(L) 7.6(L) 8.1(L) 7.1(L) 7.3(L) 7.8(L)   TSAT 15 - 46 % - - - 51(H) - 34 -   Ferritin 12 - 150 ng/mL - - - 1,746(H) - 1,535(H) -           Follow-up call date: 9/16/2019      Anemia Management Service  Manjula Mckinnon RN  Phone: [phone]  Fax: [phone]

## 2019-09-11 ENCOUNTER — TELEPHONE (OUTPATIENT)
Dept: TRANSPLANT | Facility: CLINIC | Age: 29
End: 2019-09-11

## 2019-09-11 ENCOUNTER — TEAM CONFERENCE (OUTPATIENT)
Dept: TRANSPLANT | Facility: CLINIC | Age: 29
End: 2019-09-11

## 2019-09-11 DIAGNOSIS — Z99.2 ESRD NEEDING DIALYSIS (H): ICD-10-CM

## 2019-09-11 DIAGNOSIS — Z94.0 KIDNEY TRANSPLANTED: Primary | ICD-10-CM

## 2019-09-11 DIAGNOSIS — Z94.0 KIDNEY REPLACED BY TRANSPLANT: Primary | ICD-10-CM

## 2019-09-11 DIAGNOSIS — N18.6 ESRD NEEDING DIALYSIS (H): ICD-10-CM

## 2019-09-11 NOTE — TELEPHONE ENCOUNTER
Post Kidney and Pancreas Transplant Team Conference  Date: 9/11/2019  Transplant Coordinator: Laurence Vazquez     Attendees:  [x]  Dr. Hill  [x] Leonela Brewster LPN     []  Dr. Wilson [x] Cinthia Cox, ERMELINDA [] Kiersten Chang LPN   []  Dr. Rios [] Gely Lynn RN     [] Keeley Li RN [] Anthony Hendricks, PharmD   [] Dr. Johnson [x] Melani Vazquez RN    [] Dr. Durbin [] Virgilio Ryan RN    [x] Dr. Pinto [x] Maria Teresa Mayberry RN    [] Dr. Delgadillo [] Yesika Wong RN     [] Melanie Ayala RN    [] Surgery Fellow [] Nancy Maldonado RN    [] Johana Linda NP [] Nathalia Moreno RN        Verbal Plan Read Back:   Repeat allosure tomorrow  Repeat DSA tomorrow  If creatinine increased, biopsy Friday    Routed to RN Coordinator   Leonela Brewster LPN    Mona voiced udnerstanding of plan to recheck labs tomorrow with DSA and allosure.  If creatinine on labs tomorrow is elevated, will biopsy on Friday.    Mona questioned feeling intermittent pulling at her incision, tenderness when it is pushed upon. RNCC assured her this is typical post-surgical and continue to monitor.  If worsening, notify SOT.     Mona continues to have bloating with use of fiber, but fiber has improved stools to a normal BM in the AM and a loose BM in the PM.    Mona will start aranesp next Monday in clinic, she naps frequently right now, which she attributes to her low hgb.

## 2019-09-12 ENCOUNTER — TELEPHONE (OUTPATIENT)
Dept: TRANSPLANT | Facility: CLINIC | Age: 29
End: 2019-09-12

## 2019-09-12 ENCOUNTER — RESULTS ONLY (OUTPATIENT)
Dept: OTHER | Facility: CLINIC | Age: 29
End: 2019-09-12

## 2019-09-12 DIAGNOSIS — Z94.0 KIDNEY TRANSPLANTED: Primary | ICD-10-CM

## 2019-09-12 DIAGNOSIS — N18.30 ANEMIA OF CHRONIC RENAL FAILURE, STAGE 3 (MODERATE) (H): ICD-10-CM

## 2019-09-12 DIAGNOSIS — D63.1 ANEMIA OF CHRONIC RENAL FAILURE, STAGE 3 (MODERATE) (H): ICD-10-CM

## 2019-09-12 DIAGNOSIS — Z94.0 KIDNEY REPLACED BY TRANSPLANT: ICD-10-CM

## 2019-09-12 DIAGNOSIS — N18.30 CKD (CHRONIC KIDNEY DISEASE) STAGE 3, GFR 30-59 ML/MIN (H): ICD-10-CM

## 2019-09-12 DIAGNOSIS — Z79.899 LONG TERM USE OF DRUG: ICD-10-CM

## 2019-09-12 LAB
ANION GAP SERPL CALCULATED.3IONS-SCNC: 8 MMOL/L (ref 3–14)
BUN SERPL-MCNC: 19 MG/DL (ref 7–30)
CALCIUM SERPL-MCNC: 10.3 MG/DL (ref 8.5–10.1)
CHLORIDE SERPL-SCNC: 110 MMOL/L (ref 94–109)
CO2 SERPL-SCNC: 21 MMOL/L (ref 20–32)
CREAT SERPL-MCNC: 1.12 MG/DL (ref 0.52–1.04)
ERYTHROCYTE [DISTWIDTH] IN BLOOD BY AUTOMATED COUNT: 15.8 % (ref 10–15)
GFR SERPL CREATININE-BSD FRML MDRD: 67 ML/MIN/{1.73_M2}
GLUCOSE SERPL-MCNC: 86 MG/DL (ref 70–99)
HCT VFR BLD AUTO: 27.6 % (ref 35–47)
HGB BLD-MCNC: 8.6 G/DL (ref 11.7–15.7)
MAGNESIUM SERPL-MCNC: 1.5 MG/DL (ref 1.6–2.3)
MCH RBC QN AUTO: 31.5 PG (ref 26.5–33)
MCHC RBC AUTO-ENTMCNC: 31.2 G/DL (ref 31.5–36.5)
MCV RBC AUTO: 101 FL (ref 78–100)
PHOSPHATE SERPL-MCNC: 1.6 MG/DL (ref 2.5–4.5)
PLATELET # BLD AUTO: 203 10E9/L (ref 150–450)
POTASSIUM SERPL-SCNC: 4.2 MMOL/L (ref 3.4–5.3)
RBC # BLD AUTO: 2.73 10E12/L (ref 3.8–5.2)
SODIUM SERPL-SCNC: 140 MMOL/L (ref 133–144)
TACROLIMUS BLD-MCNC: 10 UG/L (ref 5–15)
TME LAST DOSE: NORMAL H
WBC # BLD AUTO: 4.3 10E9/L (ref 4–11)

## 2019-09-12 PROCEDURE — 80197 ASSAY OF TACROLIMUS: CPT | Performed by: INTERNAL MEDICINE

## 2019-09-12 PROCEDURE — 86833 HLA CLASS II HIGH DEFIN QUAL: CPT | Performed by: INTERNAL MEDICINE

## 2019-09-12 PROCEDURE — 86832 HLA CLASS I HIGH DEFIN QUAL: CPT | Performed by: INTERNAL MEDICINE

## 2019-09-12 PROCEDURE — 86828 HLA CLASS I&II ANTIBODY QUAL: CPT | Performed by: INTERNAL MEDICINE

## 2019-09-12 NOTE — TELEPHONE ENCOUNTER
LPN/MA  Renal Biopsy Communication    Call to pt to confirm renal biopsy procedure. Biopsy orders entered and patient aware of date 9/17/2019, in [facility] and to arrive at 6:00AM.     No need to be NPO.      Discussed anticoagulants (i.e. fish oil, ASA, Plavix, Coumadin, Ibuprofen). Pt confirms use of anticoagulants. Patient will hold aspirin starting 9/13/19.    Take all medicine before arrival for biopsy and bring all medicine bottles with.      Report to 1st floor lab, then 5th floor for biopsy.      Use [email or web address] services and bring form of entertainment.     Discussed with patient need to stay overnight locally evening of procedure if live more than 70 miles away.    Call placed to scheduling at [phone] to schedule and confirm biopsy date/time    Lab appointment scheduled for morning of biopsy.

## 2019-09-12 NOTE — TELEPHONE ENCOUNTER
Post Kidney and Pancreas Transplant Team Conference  Date: 9/12/2019  Transplant Coordinator: Laurence Vazquez     Attendees:  []  Dr. Hill  [] Leonela Brewster LPN     [x]  Dr. Wilson [] Cinthia Cox, ERMELINDA [x] Kiersten Chang LPN   [x]  Dr. Rios [] Gely Lynn, RN     [] Keeley Li RN [] Anthony Hendricks, RhinaD   [x] Dr. Johnson [x] Melani Vazquez RN    [] Dr. Durbin [] Virgilio Ryan RN    [] Dr. Pinto [x] Maria Teresa Mayberry RN    [] Dr. Delgadillo [x] Yesika Wong RN     [x] Melanie Ayala RN    [] Surgery Fellow [x] Nancy Maldonado RN    [] Johana Linda NP [] Nathalia Moreno RN        Verbal Plan Read Back:   Kidney biopsy r\t elevated Allosure    Routed to RN Coordinator   Kiersten Chang LPN    See biopsy note.

## 2019-09-12 NOTE — TELEPHONE ENCOUNTER
Transplant Coordinator Renal Biopsy Communication    Call placed to Mona Kristy to discuss indication for kidney transplant biopsy per Dr. Rios.     Indication for transplant renal biopsy: elevated Allosure   Laterality: right  Date of biopsy: 9/17/19    Patient location within 70 miles of [facility]: Yes.  If no, must stay overnight locally. Pt verbalizes staying at home.     Mona Wagner's medication list was reviewed.   Anticoagulant: aspirin   Ibuprofen: No.  Fish Oil:  No.  Medications held: aspirin, baby. Hold date started 9/13/19.    Recent blood pressure readings are WNL or not applicable. Instructed to take medication, especially blood pressure medications, before arriving to the Clinic and Surgery Center at 0600 day of procedure.     Procedure expectations and duration of stay discussed. Expressed pt can expect a phone call from LPN/MA to confirm biopsy date/time/location/directions/review of medications. Pt has no additional questions at this time. Transplant Office phone number given to pt for future questions.      Laurence Vazquez RN  Kidney/Pancreas Transplant Coordinator  [phone] option 5

## 2019-09-13 LAB
DONOR IDENTIFICATION: NORMAL
DSA COMMENTS: NORMAL
DSA PRESENT: NO
DSA TEST METHOD: NORMAL
INTERFERING SUBST TEST METHOD: NORMAL
INTERFERING SUBSTANCE COMMENT: NORMAL
INTERFERING SUBSTANCE RESULT: NORMAL
INTERFERING SUBSTANCE: NORMAL
ORGAN: NORMAL
SA1 CELL: NORMAL
SA1 COMMENTS: NORMAL
SA1 HI RISK ABY: NORMAL
SA1 MOD RISK ABY: NORMAL
SA1 TEST METHOD: NORMAL
SA2 CELL: NORMAL
SA2 COMMENTS: NORMAL
SA2 HI RISK ABY UA: NORMAL
SA2 MOD RISK ABY: NORMAL
SA2 TEST METHOD: NORMAL
UNACCEPTABLE ANTIGEN: NORMAL
UNOS CPRA: 25

## 2019-09-16 ENCOUNTER — ALLIED HEALTH/NURSE VISIT (OUTPATIENT)
Dept: TRANSPLANT | Facility: CLINIC | Age: 29
End: 2019-09-16
Payer: MEDICARE

## 2019-09-16 ENCOUNTER — OFFICE VISIT (OUTPATIENT)
Dept: TRANSPLANT | Facility: CLINIC | Age: 29
End: 2019-09-16
Attending: NURSE PRACTITIONER
Payer: MEDICARE

## 2019-09-16 ENCOUNTER — TELEPHONE (OUTPATIENT)
Dept: PHARMACY | Facility: CLINIC | Age: 29
End: 2019-09-16

## 2019-09-16 VITALS — DIASTOLIC BLOOD PRESSURE: 76 MMHG | OXYGEN SATURATION: 100 % | HEART RATE: 92 BPM | SYSTOLIC BLOOD PRESSURE: 119 MMHG

## 2019-09-16 VITALS — SYSTOLIC BLOOD PRESSURE: 119 MMHG | HEART RATE: 91 BPM | DIASTOLIC BLOOD PRESSURE: 76 MMHG

## 2019-09-16 DIAGNOSIS — D63.1 ANEMIA OF CHRONIC RENAL FAILURE, STAGE 3 (MODERATE) (H): ICD-10-CM

## 2019-09-16 DIAGNOSIS — Z94.0 KIDNEY TRANSPLANTED: ICD-10-CM

## 2019-09-16 DIAGNOSIS — R30.0 DYSURIA: ICD-10-CM

## 2019-09-16 DIAGNOSIS — Z94.0 KIDNEY TRANSPLANTED: Primary | ICD-10-CM

## 2019-09-16 DIAGNOSIS — N18.30 CKD (CHRONIC KIDNEY DISEASE) STAGE 3, GFR 30-59 ML/MIN (H): Primary | ICD-10-CM

## 2019-09-16 DIAGNOSIS — Z96.0 URETERAL STENT RETAINED: ICD-10-CM

## 2019-09-16 DIAGNOSIS — N18.30 ANEMIA OF CHRONIC RENAL FAILURE, STAGE 3 (MODERATE) (H): ICD-10-CM

## 2019-09-16 DIAGNOSIS — Z94.0 KIDNEY REPLACED BY TRANSPLANT: Primary | ICD-10-CM

## 2019-09-16 DIAGNOSIS — Z94.0 KIDNEY REPLACED BY TRANSPLANT: ICD-10-CM

## 2019-09-16 DIAGNOSIS — Z48.298 AFTERCARE FOLLOWING ORGAN TRANSPLANT: ICD-10-CM

## 2019-09-16 LAB
ALBUMIN UR-MCNC: NEGATIVE MG/DL
ALLOSURE DD-CFDNA: 1.5 %
APPEARANCE UR: CLEAR
BILIRUB UR QL STRIP: NEGATIVE
COLOR UR AUTO: ABNORMAL
GLUCOSE UR STRIP-MCNC: NEGATIVE MG/DL
HGB UR QL STRIP: ABNORMAL
KETONES UR STRIP-MCNC: NEGATIVE MG/DL
LEUKOCYTE ESTERASE UR QL STRIP: NEGATIVE
MUCOUS THREADS #/AREA URNS LPF: PRESENT /LPF
NITRATE UR QL: NEGATIVE
PH UR STRIP: 6 PH (ref 5–7)
RBC #/AREA URNS AUTO: 4 /HPF (ref 0–2)
SOURCE: ABNORMAL
SP GR UR STRIP: 1 (ref 1–1.03)
SQUAMOUS #/AREA URNS AUTO: 1 /HPF (ref 0–1)
UROBILINOGEN UR STRIP-MCNC: 0 MG/DL (ref 0–2)
WBC #/AREA URNS AUTO: 1 /HPF (ref 0–5)

## 2019-09-16 PROCEDURE — 25000125 ZZHC RX 250: Mod: ZF | Performed by: NURSE PRACTITIONER

## 2019-09-16 PROCEDURE — G0463 HOSPITAL OUTPT CLINIC VISIT: HCPCS | Mod: ZF

## 2019-09-16 PROCEDURE — 40000269 ZZH STATISTIC NO CHARGE FACILITY FEE: Mod: ZF

## 2019-09-16 PROCEDURE — 52310 CYSTOSCOPY AND TREATMENT: CPT | Mod: ZF | Performed by: NURSE PRACTITIONER

## 2019-09-16 PROCEDURE — 96372 THER/PROPH/DIAG INJ SC/IM: CPT | Mod: XU

## 2019-09-16 PROCEDURE — 25000128 H RX IP 250 OP 636: Mod: EC | Performed by: INTERNAL MEDICINE

## 2019-09-16 PROCEDURE — 25000132 ZZH RX MED GY IP 250 OP 250 PS 637: Mod: GY,ZF | Performed by: NURSE PRACTITIONER

## 2019-09-16 RX ORDER — ALBUTEROL SULFATE 0.83 MG/ML
2.5 SOLUTION RESPIRATORY (INHALATION) ONCE
Status: COMPLETED | OUTPATIENT
Start: 2019-09-16 | End: 2019-09-16

## 2019-09-16 RX ORDER — LEVOFLOXACIN 250 MG/1
500 TABLET, FILM COATED ORAL ONCE
Status: COMPLETED | OUTPATIENT
Start: 2019-09-16 | End: 2019-09-16

## 2019-09-16 RX ORDER — PENTAMIDINE ISETHIONATE 300 MG/300MG
300 INHALANT RESPIRATORY (INHALATION) ONCE
Status: COMPLETED | OUTPATIENT
Start: 2019-09-16 | End: 2019-09-16

## 2019-09-16 RX ORDER — PENTAMIDINE ISETHIONATE 300 MG/300MG
300 INHALANT RESPIRATORY (INHALATION) ONCE
Status: CANCELLED | OUTPATIENT
Start: 2019-10-14

## 2019-09-16 RX ORDER — ALBUTEROL SULFATE 0.83 MG/ML
2.5 SOLUTION RESPIRATORY (INHALATION) ONCE
Status: CANCELLED | OUTPATIENT
Start: 2019-10-14

## 2019-09-16 RX ORDER — LIDOCAINE HYDROCHLORIDE 20 MG/ML
JELLY TOPICAL ONCE
Status: COMPLETED | OUTPATIENT
Start: 2019-09-16 | End: 2019-09-16

## 2019-09-16 RX ADMIN — PENTAMIDINE ISETHIONATE 300 MG: 300 INHALANT RESPIRATORY (INHALATION) at 10:42

## 2019-09-16 RX ADMIN — ALBUTEROL SULFATE 2.5 MG: 0.83 SOLUTION RESPIRATORY (INHALATION) at 10:41

## 2019-09-16 RX ADMIN — DARBEPOETIN ALFA 40 MCG: 40 INJECTION, SOLUTION INTRAVENOUS; SUBCUTANEOUS at 10:11

## 2019-09-16 RX ADMIN — LIDOCAINE HYDROCHLORIDE: 20 JELLY TOPICAL at 10:26

## 2019-09-16 RX ADMIN — LEVOFLOXACIN 500 MG: 250 TABLET, FILM COATED ORAL at 10:25

## 2019-09-16 NOTE — TELEPHONE ENCOUNTER
Anemia Management Note  SUBJECTIVE/OBJECTIVE:  Referred by Dr. Nakul Hill on 2019  Primary Diagnosis: Anemia in Chronic Kidney Disease (N18.3, D63.1)     Secondary Diagnosis:  Chronic Kidney Disease, Stage 3 (N18.3)   Kidney Tx: 2019  Hgb goal range:  9-10  Epo/Darbo: Aranesp  40 mcg  every two weeks for Hgb <10.  In clinic.   Iron regimen:  NA  Labs : 2020  Recent CHARLINE use, transfusion, IV iron: PO Iron and Aranesp   RX/TX plans :2020  No history of stroke, MI and blood clots or cancers  Contact:            Ok to leave message  per consent to communicate dated 05/15/2019                              OK to speak with Jt Aleman () per consent to communicate dated 05/15/2013         Anemia Latest Ref Rng & Units 2019 2019 2019 9/3/2019 2019 2019 2019   CHARLINE Dose - - - - - - - 40mcg   Hemoglobin 11.7 - 15.7 g/dL 7.6(L) 8.1(L) 7.1(L) 7.3(L) 7.8(L) 8.6(L) -   TSAT 15 - 46 % - 51(H) - 34 - - -   Ferritin 12 - 150 ng/mL - 1,746(H) - 1,535(H) - - -     BP Readings from Last 3 Encounters:   19 119/76   19 119/76   19 128/84     Wt Readings from Last 2 Encounters:   19 53.8 kg (118 lb 8 oz)   19 53.2 kg (117 lb 3.2 oz)         ASSESSMENT:  Hgb:Not at goal but improving - recommend dose and continue current regimen  TSat: at goal >30% Ferritin: Elevated (>1000ng/mL)    PLAN:  Dose with aranesp and RTC for hgb then aranesp if needed in 2 week(s)    Orders needed to be renewed (for next follow-up date) in EPIC: None    Iron labs due:  10/01/2019    Plan discussed with:  SHIKHA Dunn  Plan provided by:  solomon    NEXT FOLLOW-UP DATE:  2019    Anemia Management Service  Manjula Mckinnon RN  Phone: [phone]  Fax: [phone]

## 2019-09-16 NOTE — NURSING NOTE
Frequency: Every two weeks  Most recent or today's HGB: 8.6   Date: 19  Date of lat dose: Today is initial dose  HGB associated with last dose given: N/A    Blood Pressure:119/76    Diagnosis: Anemia in CKD    Ordered by: Nakul Hill MD  VIS Offered: Yes    Double Checked by: Eugenia Corrigan CMA    See MAR for administration details    Pt's first name, last name and  verified prior to medication administration, injection given without complications or questions.     Danielle Gastelum CMA CMA    2019 9:49 AM

## 2019-09-16 NOTE — LETTER
9/16/2019       RE: Mona Wagner  [address]  [place] [address]     Dear Colleague,    Thank you for referring your patient, Mona Wagner, to the [facility] SOLID ORGAN TRANSPLANT at [facility]. Please see a copy of my visit note below.    See procedure note     Again, thank you for allowing me to participate in the care of your patient.      Sincerely,    Johana Linda NP

## 2019-09-16 NOTE — PROGRESS NOTES
Patient was seen today for a Pentamidine nebulizer tx ordered by Dr.Karin Susan Deleon.    Patient was first given 2.5 mg nebulizer, after which Pentamidine 300 mg (Lot # [id number]) mixed with 6cc Sterile H2O was administered through a filtered nebulizer.    No adverse side effects noted by the patient.    Pre-Treatment: SpO2 = 100%     HR = 92     BBS = clear  Post-Treatment:  SpO2 = 100%     HR = 112      BBS = clear    Procedure was completed today by IRIS Rachel.

## 2019-09-16 NOTE — NURSING NOTE
Chief Complaint   Patient presents with     Cystoscopy     Stent removal       Blood pressure 119/76, pulse 91, not currently breastfeeding.      Procedure completed per organizational policy, [place] Protocol Safety Checklist for Non-OR Invasive Procedures completed and sent for scan.     Prepped patient for procedure with no complications.  Assisted with stent removal.  Administered Levaquin after procedure.  Patient was discharged in stable condition.      Nirmal Polanco CMA on 9/16/2019 at 10:25 AM

## 2019-09-16 NOTE — PROCEDURES
Transplant Surgery  Operative Note    Preop dx: Status post  Donor kidney transplant.  Post op dx: same   Procedure: Flexible cystoscopy and ureteral stent removal   Surgeon: fabian kirk cnp   Assistant: tito zhong MA  Anesthesia: local  EBL: 0   Specimens: none.  Findings: none abnormal. Stent inspected and noted to be intact.   Indication: The patient is status post kidney transplant and the ureteral stent is no longer needed.    Procedure: The patient was positioned supine on the table.  The groin was sterilely prepped and draped in the usual fashion. Time out was done. Urojet was applied to the urethra. A flexible cystoscope was inserted and advanced thru the urethra into the bladder. The stent was visualized and grasped. The cystoscope, grasper and stent were removed en-mass. The stent was visualized to be intact.  The bladder mucosa was normal in appearance. Antibiotics were administered. The patient tolerated the procedure well and was asked to void before leaving the clinic.

## 2019-09-17 ENCOUNTER — HOSPITAL ENCOUNTER (OUTPATIENT)
Facility: AMBULATORY SURGERY CENTER | Age: 29
End: 2019-09-17
Attending: INTERNAL MEDICINE
Payer: MEDICARE

## 2019-09-17 ENCOUNTER — ANCILLARY PROCEDURE (OUTPATIENT)
Dept: RADIOLOGY | Facility: AMBULATORY SURGERY CENTER | Age: 29
End: 2019-09-17
Attending: INTERNAL MEDICINE
Payer: MEDICARE

## 2019-09-17 ENCOUNTER — TELEPHONE (OUTPATIENT)
Dept: TRANSPLANT | Facility: CLINIC | Age: 29
End: 2019-09-17

## 2019-09-17 VITALS
HEART RATE: 87 BPM | OXYGEN SATURATION: 100 % | BODY MASS INDEX: 22.97 KG/M2 | RESPIRATION RATE: 14 BRPM | HEIGHT: 60 IN | DIASTOLIC BLOOD PRESSURE: 89 MMHG | SYSTOLIC BLOOD PRESSURE: 126 MMHG | TEMPERATURE: 98.7 F | WEIGHT: 117 LBS

## 2019-09-17 DIAGNOSIS — Z94.0 KIDNEY TRANSPLANTED: ICD-10-CM

## 2019-09-17 DIAGNOSIS — N18.30 ANEMIA OF CHRONIC RENAL FAILURE, STAGE 3 (MODERATE) (H): ICD-10-CM

## 2019-09-17 DIAGNOSIS — Z79.899 LONG TERM USE OF DRUG: ICD-10-CM

## 2019-09-17 DIAGNOSIS — N18.30 CKD (CHRONIC KIDNEY DISEASE) STAGE 3, GFR 30-59 ML/MIN (H): ICD-10-CM

## 2019-09-17 DIAGNOSIS — Z94.0 KIDNEY REPLACED BY TRANSPLANT: ICD-10-CM

## 2019-09-17 DIAGNOSIS — D63.1 ANEMIA OF CHRONIC RENAL FAILURE, STAGE 3 (MODERATE) (H): ICD-10-CM

## 2019-09-17 LAB
ANION GAP SERPL CALCULATED.3IONS-SCNC: 3 MMOL/L (ref 3–14)
BACTERIA SPEC CULT: NO GROWTH
BUN SERPL-MCNC: 29 MG/DL (ref 7–30)
CALCIUM SERPL-MCNC: 10.4 MG/DL (ref 8.5–10.1)
CHLORIDE SERPL-SCNC: 110 MMOL/L (ref 94–109)
CO2 SERPL-SCNC: 25 MMOL/L (ref 20–32)
CREAT SERPL-MCNC: 1.42 MG/DL (ref 0.52–1.04)
ERYTHROCYTE [DISTWIDTH] IN BLOOD BY AUTOMATED COUNT: 15.4 % (ref 10–15)
GFR SERPL CREATININE-BSD FRML MDRD: 51 ML/MIN/{1.73_M2}
GLUCOSE SERPL-MCNC: 88 MG/DL (ref 70–99)
HCT VFR BLD AUTO: 28.8 % (ref 35–47)
HGB BLD-MCNC: 8.9 G/DL (ref 11.7–15.7)
HGB BLD-MCNC: 9.1 G/DL (ref 11.7–15.7)
Lab: NORMAL
MAGNESIUM SERPL-MCNC: 1.4 MG/DL (ref 1.6–2.3)
MCH RBC QN AUTO: 31.8 PG (ref 26.5–33)
MCHC RBC AUTO-ENTMCNC: 31.6 G/DL (ref 31.5–36.5)
MCV RBC AUTO: 101 FL (ref 78–100)
PHOSPHATE SERPL-MCNC: 2.1 MG/DL (ref 2.5–4.5)
PLATELET # BLD AUTO: 183 10E9/L (ref 150–450)
POTASSIUM SERPL-SCNC: 4.7 MMOL/L (ref 3.4–5.3)
RBC # BLD AUTO: 2.86 10E12/L (ref 3.8–5.2)
SODIUM SERPL-SCNC: 138 MMOL/L (ref 133–144)
SPECIMEN SOURCE: NORMAL
TACROLIMUS BLD-MCNC: 12.8 UG/L (ref 5–15)
TME LAST DOSE: 2030 H
WBC # BLD AUTO: 4.3 10E9/L (ref 4–11)

## 2019-09-17 PROCEDURE — 88313 SPECIAL STAINS GROUP 2: CPT | Performed by: INTERNAL MEDICINE

## 2019-09-17 PROCEDURE — 88305 TISSUE EXAM BY PATHOLOGIST: CPT | Performed by: INTERNAL MEDICINE

## 2019-09-17 PROCEDURE — 88348 ELECTRON MICROSCOPY DX: CPT | Performed by: INTERNAL MEDICINE

## 2019-09-17 PROCEDURE — 87799 DETECT AGENT NOS DNA QUANT: CPT | Performed by: INTERNAL MEDICINE

## 2019-09-17 PROCEDURE — 88346 IMFLUOR 1ST 1ANTB STAIN PX: CPT | Performed by: INTERNAL MEDICINE

## 2019-09-17 PROCEDURE — 80197 ASSAY OF TACROLIMUS: CPT | Performed by: INTERNAL MEDICINE

## 2019-09-17 PROCEDURE — 88350 IMFLUOR EA ADDL 1ANTB STN PX: CPT | Performed by: INTERNAL MEDICINE

## 2019-09-17 ASSESSMENT — MIFFLIN-ST. JEOR: SCORE: 1192.21

## 2019-09-17 NOTE — TELEPHONE ENCOUNTER
ISSUE:   Tacrolimus level 12.8 on 9/17, goal 8-10, dose 10 mg BID    **this may not be a true 12h trough    PLAN:   Please call pt and confirm this was a good 12-hour trough. Verify dose 10 mg BID. Confirm no new medications or illness (sugar. Diarrhea). If good trough, decrease dose to 9 mg BID and recheck level as scheduled.    **if not a true 12h trough, no need for change, recheck as scheduled.

## 2019-09-17 NOTE — DISCHARGE INSTRUCTIONS
[facility]  Home Care Following Kidney Biopsy  ACTIVITY: NO heavy lifting (weight greater than 10 pounds) for 1 week. NO strenuous activity for 24 hours; relax and take it easy.     DIET: Resume your regular diet and drink plenty of fluids UNLESS you are fluid restricted.    DRAINAGE: There should be minimal drainage from the biopsy site. If bleeding soaks the dressing, you should lie down and apply pressure to the site for a minimum of 10 minutes. Call one of the numbers below if experienced bleeding that soaked the dressing.     DRESSING: Keep the dressing in place for 24 hours to prevent the site from re-opening and bleeding.     SEDATION: IF you received sedation medications, DO NOT drive or operate heavy machinery, DO NOT drink alcoholic beverages, and DO NOT make important legal decisions.    CALL ONE OF THE NUMBERS BELOW IF YOU EXPERIENCE ANY ONE OF THE FOLLOWING:      Excessive bleeding or drainage    Excessive swelling, redness, or tenderness at the site    Fever above 100.5 F, orally    Severe pain    Drainage that is green, yellow, thick white, or has a bad odor    If you havebloody urine or see bloody clots in your urine     Emergency Department: [phone] (open 24 hours)  Transplant Center: [phone] (open 7:00 AM - 6:30 PM)

## 2019-09-18 NOTE — TELEPHONE ENCOUNTER
The biopsy is negative for rejection. No changes are necessary. Can you call patient to let her know?    Thanks,    Deny Rios MD

## 2019-09-18 NOTE — TELEPHONE ENCOUNTER
Called and spoke with Mona.  Let her know the kidney transplant biopsy was negative for rejection and no changes are needed at this time.  Patient verbalized understanding to continue with twice weekly labs.

## 2019-09-19 ENCOUNTER — TELEPHONE (OUTPATIENT)
Dept: TRANSPLANT | Facility: CLINIC | Age: 29
End: 2019-09-19

## 2019-09-19 DIAGNOSIS — I15.1 HTN, KIDNEY TRANSPLANT RELATED: Primary | ICD-10-CM

## 2019-09-19 DIAGNOSIS — Z79.899 LONG TERM USE OF DRUG: ICD-10-CM

## 2019-09-19 DIAGNOSIS — Z94.0 KIDNEY TRANSPLANTED: ICD-10-CM

## 2019-09-19 DIAGNOSIS — Z94.0 HTN, KIDNEY TRANSPLANT RELATED: Primary | ICD-10-CM

## 2019-09-19 DIAGNOSIS — Z94.0 KIDNEY REPLACED BY TRANSPLANT: ICD-10-CM

## 2019-09-19 DIAGNOSIS — Z94.0 KIDNEY REPLACED BY TRANSPLANT: Primary | ICD-10-CM

## 2019-09-19 LAB
ANION GAP SERPL CALCULATED.3IONS-SCNC: 8 MMOL/L (ref 3–14)
BKV DNA # SPEC NAA+PROBE: NORMAL COPIES/ML
BKV DNA SPEC NAA+PROBE-LOG#: NORMAL LOG COPIES/ML
BUN SERPL-MCNC: 32 MG/DL (ref 7–30)
CALCIUM SERPL-MCNC: 10.3 MG/DL (ref 8.5–10.1)
CHLORIDE SERPL-SCNC: 111 MMOL/L (ref 94–109)
CO2 SERPL-SCNC: 19 MMOL/L (ref 20–32)
CREAT SERPL-MCNC: 1.38 MG/DL (ref 0.52–1.04)
ERYTHROCYTE [DISTWIDTH] IN BLOOD BY AUTOMATED COUNT: 15.2 % (ref 10–15)
GFR SERPL CREATININE-BSD FRML MDRD: 52 ML/MIN/{1.73_M2}
GLUCOSE SERPL-MCNC: 98 MG/DL (ref 70–99)
HCT VFR BLD AUTO: 31.7 % (ref 35–47)
HGB BLD-MCNC: 9.7 G/DL (ref 11.7–15.7)
MCH RBC QN AUTO: 30.8 PG (ref 26.5–33)
MCHC RBC AUTO-ENTMCNC: 30.6 G/DL (ref 31.5–36.5)
MCV RBC AUTO: 101 FL (ref 78–100)
PLATELET # BLD AUTO: 226 10E9/L (ref 150–450)
POTASSIUM SERPL-SCNC: 5 MMOL/L (ref 3.4–5.3)
RBC # BLD AUTO: 3.15 10E12/L (ref 3.8–5.2)
SODIUM SERPL-SCNC: 138 MMOL/L (ref 133–144)
SPECIMEN SOURCE: NORMAL
TACROLIMUS BLD-MCNC: 13.5 UG/L (ref 5–15)
TME LAST DOSE: NORMAL H
WBC # BLD AUTO: 4.3 10E9/L (ref 4–11)

## 2019-09-19 PROCEDURE — 85027 COMPLETE CBC AUTOMATED: CPT | Performed by: INTERNAL MEDICINE

## 2019-09-19 PROCEDURE — 80048 BASIC METABOLIC PNL TOTAL CA: CPT | Performed by: INTERNAL MEDICINE

## 2019-09-19 PROCEDURE — 80197 ASSAY OF TACROLIMUS: CPT | Performed by: INTERNAL MEDICINE

## 2019-09-19 NOTE — TELEPHONE ENCOUNTER
Creatinine remains elevated despite negative biopsy.  Patient denies any ongoing pain, no swelling.     Patient only drinking 48oz of water per day. Verbalized understanding of the importance of drinking 100oz per day.  Weight stable, no change in UOP.    /66, patient reports feeling light headed when standing up this morning.  Currently taking 0.1mg Florinef daily. Requested patient take a set of orthostatic BPs. Will give 1L NS tomorrow, patient aware of plan and orders placed.      Calcium elevated, patient reported starting sensipar.      MyChart message received from patient regarding birth control, awaiting pharmacy recommendations.  Patient aware.

## 2019-09-20 ENCOUNTER — INFUSION THERAPY VISIT (OUTPATIENT)
Dept: INFUSION THERAPY | Facility: CLINIC | Age: 29
End: 2019-09-20
Attending: INTERNAL MEDICINE
Payer: MEDICARE

## 2019-09-20 ENCOUNTER — TELEPHONE (OUTPATIENT)
Dept: TRANSPLANT | Facility: CLINIC | Age: 29
End: 2019-09-20

## 2019-09-20 VITALS
SYSTOLIC BLOOD PRESSURE: 113 MMHG | HEART RATE: 84 BPM | TEMPERATURE: 98.1 F | OXYGEN SATURATION: 99 % | DIASTOLIC BLOOD PRESSURE: 74 MMHG

## 2019-09-20 DIAGNOSIS — Z94.0 KIDNEY REPLACED BY TRANSPLANT: Primary | ICD-10-CM

## 2019-09-20 DIAGNOSIS — D63.1 ANEMIA OF CHRONIC RENAL FAILURE, STAGE 3 (MODERATE) (H): ICD-10-CM

## 2019-09-20 DIAGNOSIS — Z94.0 KIDNEY REPLACED BY TRANSPLANT: ICD-10-CM

## 2019-09-20 DIAGNOSIS — N18.30 ANEMIA OF CHRONIC RENAL FAILURE, STAGE 3 (MODERATE) (H): ICD-10-CM

## 2019-09-20 DIAGNOSIS — N18.30 CKD (CHRONIC KIDNEY DISEASE) STAGE 3, GFR 30-59 ML/MIN (H): Primary | ICD-10-CM

## 2019-09-20 DIAGNOSIS — Z48.298 AFTERCARE FOLLOWING ORGAN TRANSPLANT: ICD-10-CM

## 2019-09-20 LAB — COPATH REPORT: NORMAL

## 2019-09-20 PROCEDURE — 25000128 H RX IP 250 OP 636: Mod: ZF | Performed by: INTERNAL MEDICINE

## 2019-09-20 PROCEDURE — 96365 THER/PROPH/DIAG IV INF INIT: CPT

## 2019-09-20 PROCEDURE — 96366 THER/PROPH/DIAG IV INF ADDON: CPT

## 2019-09-20 RX ORDER — TACROLIMUS 1 MG/1
3 CAPSULE ORAL 2 TIMES DAILY
Qty: 180 CAPSULE | Refills: 11 | COMMUNITY
Start: 2019-09-20 | End: 2019-11-05

## 2019-09-20 RX ORDER — TACROLIMUS 5 MG/1
5 CAPSULE ORAL 2 TIMES DAILY
Qty: 120 CAPSULE | Refills: 11 | COMMUNITY
Start: 2019-09-20 | End: 2019-11-12

## 2019-09-20 RX ORDER — TACROLIMUS 0.5 MG/1
CAPSULE ORAL
Qty: 30 CAPSULE | Refills: 0 | COMMUNITY
Start: 2019-09-20 | End: 2019-11-05

## 2019-09-20 RX ADMIN — SODIUM CHLORIDE 1000 ML: 9 INJECTION, SOLUTION INTRAVENOUS at 15:08

## 2019-09-20 NOTE — TELEPHONE ENCOUNTER
Issue:   Tac level 13.5, goal 8-10.     Confirmed patient taking 10mg BID, confirmed accurate 12 hour drug level, no new diarrhea.   Decrease to 8mg BID and recheck as scheduled. Patient verbalized understanding.     Patient is waiting to hear from ATC to schedule IV fluid appointment. Reports BPs have improved to 110s/70s this morning after drinking 5.5 bottles of water yesterday. Denies light headedness or dizziness.

## 2019-09-20 NOTE — PROGRESS NOTES
Nursing Note  Mona Wagner presents [facility] for:   Chief Complaint   Patient presents with     Infusion     IV fluids     During today's [facility] appointment, orders from Nakul Hill MD were completed.  Frequency: once    Progress note:  Patient identification verified by name and date of birth.  Assessment completed.  Vitals recorded in Doc Flowsheets.  Patient was provided with education regarding infusion and possible side effects.  Patient verbalized understanding.     present during visit today: Not Applicable.    Treatment Conditions: not applicable    Premedications: were not ordered.    Drug Waste Record: No    Infusion length and rate:  given over 1 hour     Labs: were not ordered for this appointment.    Vascular access: peripheral IV placed today.    Post Infusion Assessment:  Patient tolerated infusion without incident.     Discharge Plan:   Follow up plan of care with: primary medical doctor.  Discharge instructions were reviewed with patient.  Patient/representative verbalized understanding of discharge instructions and all questions answered.  Patient discharged from [facility] in stable condition.    Prachi Griffin RN    Administrations This Visit     0.9% sodium chloride BOLUS     Admin Date  09/20/2019 Action  New Bag Dose  1000 mL Route  Intravenous Administered By  Prachi Griffin RN              /74   Pulse 84   Temp 98.1  F (36.7  C) (Oral)   SpO2 99%

## 2019-09-23 DIAGNOSIS — Z94.0 KIDNEY REPLACED BY TRANSPLANT: ICD-10-CM

## 2019-09-23 DIAGNOSIS — Z79.899 LONG TERM USE OF DRUG: ICD-10-CM

## 2019-09-23 DIAGNOSIS — Z48.298 AFTERCARE FOLLOWING ORGAN TRANSPLANT: Primary | ICD-10-CM

## 2019-09-23 LAB
ANION GAP SERPL CALCULATED.3IONS-SCNC: 8 MMOL/L (ref 3–14)
BUN SERPL-MCNC: 24 MG/DL (ref 7–30)
CALCIUM SERPL-MCNC: 10.1 MG/DL (ref 8.5–10.1)
CHLORIDE SERPL-SCNC: 112 MMOL/L (ref 94–109)
CO2 SERPL-SCNC: 20 MMOL/L (ref 20–32)
CREAT SERPL-MCNC: 1.24 MG/DL (ref 0.52–1.04)
ERYTHROCYTE [DISTWIDTH] IN BLOOD BY AUTOMATED COUNT: 14.8 % (ref 10–15)
GFR SERPL CREATININE-BSD FRML MDRD: 60 ML/MIN/{1.73_M2}
GLUCOSE SERPL-MCNC: 94 MG/DL (ref 70–99)
HCT VFR BLD AUTO: 30.2 % (ref 35–47)
HGB BLD-MCNC: 9.5 G/DL (ref 11.7–15.7)
MAGNESIUM SERPL-MCNC: 1.6 MG/DL (ref 1.6–2.3)
MCH RBC QN AUTO: 31.4 PG (ref 26.5–33)
MCHC RBC AUTO-ENTMCNC: 31.5 G/DL (ref 31.5–36.5)
MCV RBC AUTO: 100 FL (ref 78–100)
PHOSPHATE SERPL-MCNC: 2.1 MG/DL (ref 2.5–4.5)
PLATELET # BLD AUTO: 201 10E9/L (ref 150–450)
POTASSIUM SERPL-SCNC: 4.9 MMOL/L (ref 3.4–5.3)
RBC # BLD AUTO: 3.03 10E12/L (ref 3.8–5.2)
SODIUM SERPL-SCNC: 139 MMOL/L (ref 133–144)
TACROLIMUS BLD-MCNC: 10.1 UG/L (ref 5–15)
TME LAST DOSE: NORMAL H
WBC # BLD AUTO: 4.1 10E9/L (ref 4–11)

## 2019-09-23 PROCEDURE — 80048 BASIC METABOLIC PNL TOTAL CA: CPT | Performed by: INTERNAL MEDICINE

## 2019-09-23 PROCEDURE — 80197 ASSAY OF TACROLIMUS: CPT | Performed by: INTERNAL MEDICINE

## 2019-09-23 PROCEDURE — 84100 ASSAY OF PHOSPHORUS: CPT | Performed by: INTERNAL MEDICINE

## 2019-09-23 PROCEDURE — 83735 ASSAY OF MAGNESIUM: CPT | Performed by: INTERNAL MEDICINE

## 2019-09-23 PROCEDURE — 85027 COMPLETE CBC AUTOMATED: CPT | Performed by: INTERNAL MEDICINE

## 2019-09-26 ENCOUNTER — TELEPHONE (OUTPATIENT)
Dept: PHARMACY | Facility: CLINIC | Age: 29
End: 2019-09-26

## 2019-09-26 DIAGNOSIS — Z94.0 KIDNEY REPLACED BY TRANSPLANT: ICD-10-CM

## 2019-09-26 DIAGNOSIS — Z79.899 LONG TERM USE OF DRUG: ICD-10-CM

## 2019-09-26 DIAGNOSIS — Z94.0 KIDNEY TRANSPLANTED: ICD-10-CM

## 2019-09-26 DIAGNOSIS — Z94.0 KIDNEY REPLACED BY TRANSPLANT: Primary | ICD-10-CM

## 2019-09-26 LAB
ANION GAP SERPL CALCULATED.3IONS-SCNC: 8 MMOL/L (ref 3–14)
BUN SERPL-MCNC: 20 MG/DL (ref 7–30)
CALCIUM SERPL-MCNC: 10.2 MG/DL (ref 8.5–10.1)
CHLORIDE SERPL-SCNC: 110 MMOL/L (ref 94–109)
CO2 SERPL-SCNC: 21 MMOL/L (ref 20–32)
CREAT SERPL-MCNC: 1.12 MG/DL (ref 0.52–1.04)
ERYTHROCYTE [DISTWIDTH] IN BLOOD BY AUTOMATED COUNT: 14.5 % (ref 10–15)
GFR SERPL CREATININE-BSD FRML MDRD: 67 ML/MIN/{1.73_M2}
GLUCOSE SERPL-MCNC: 91 MG/DL (ref 70–99)
HCT VFR BLD AUTO: 31 % (ref 35–47)
HGB BLD-MCNC: 9.7 G/DL (ref 11.7–15.7)
MAGNESIUM SERPL-MCNC: 1.7 MG/DL (ref 1.6–2.3)
MCH RBC QN AUTO: 30.9 PG (ref 26.5–33)
MCHC RBC AUTO-ENTMCNC: 31.3 G/DL (ref 31.5–36.5)
MCV RBC AUTO: 99 FL (ref 78–100)
PHOSPHATE SERPL-MCNC: 1.9 MG/DL (ref 2.5–4.5)
PLATELET # BLD AUTO: 181 10E9/L (ref 150–450)
POTASSIUM SERPL-SCNC: 4.5 MMOL/L (ref 3.4–5.3)
RBC # BLD AUTO: 3.14 10E12/L (ref 3.8–5.2)
SODIUM SERPL-SCNC: 140 MMOL/L (ref 133–144)
TACROLIMUS BLD-MCNC: 9.4 UG/L (ref 5–15)
TME LAST DOSE: NORMAL H
WBC # BLD AUTO: 3.3 10E9/L (ref 4–11)

## 2019-09-26 PROCEDURE — 85027 COMPLETE CBC AUTOMATED: CPT | Performed by: INTERNAL MEDICINE

## 2019-09-26 PROCEDURE — 80048 BASIC METABOLIC PNL TOTAL CA: CPT | Performed by: INTERNAL MEDICINE

## 2019-09-26 PROCEDURE — 80197 ASSAY OF TACROLIMUS: CPT | Performed by: INTERNAL MEDICINE

## 2019-09-26 PROCEDURE — 83735 ASSAY OF MAGNESIUM: CPT | Performed by: INTERNAL MEDICINE

## 2019-09-26 PROCEDURE — 84100 ASSAY OF PHOSPHORUS: CPT | Performed by: INTERNAL MEDICINE

## 2019-09-26 NOTE — TELEPHONE ENCOUNTER
Anemia Management Note  SUBJECTIVE/OBJECTIVE:  Referred by Dr. Nakul Hill on 2019  Primary Diagnosis: Anemia in Chronic Kidney Disease (N18.3, D63.1)     Secondary Diagnosis:  Chronic Kidney Disease, Stage 3 (N18.3)   Kidney Tx: 2019  Hgb goal range:  9-10  Epo/Darbo: Aranesp  40 mcg  every two weeks for Hgb <10.  In clinic.   Iron regimen:  NA  Labs : 2020  Recent CHARLINE use, transfusion, IV iron: PO Iron and Aranesp   RX/TX plans :2020  No history of stroke, MI and blood clots or cancers  Contact:            Ok to leave message  per consent to communicate dated 05/15/2019                              OK to speak with Jt Aleman () per consent to communicate dated 05/15/2013    Anemia Latest Ref Rng & Units 2019   CHARLINE Dose - - 40mcg - - - - -   Hemoglobin 11.7 - 15.7 g/dL 8.6(L) - 9.1(L) 8.9(L) 9.7(L) 9.5(L) 9.7(L)   TSAT 15 - 46 % - - - - - - -   Ferritin 12 - 150 ng/mL - - - - - - -     BP Readings from Last 3 Encounters:   19 113/74   19 126/89   19 119/76     Wt Readings from Last 2 Encounters:   19 53.1 kg (117 lb)   19 53.8 kg (118 lb 8 oz)           ASSESSMENT:  Hgb:At goal but rapid rise in hgb (>1pt/2 weeks) - recommend hold dose  TSat: at goal >30% Ferritin: Elevated (>1000ng/mL)    PLAN:  Hold Aranesp and RTC for hgb then aranesp if needed in 1-2 week(s)    Orders needed to be renewed (for next follow-up date) in EPIC: None    Iron labs due:  10/01/2019    Plan discussed with:  Mona.   Plan provided by:  solomon    NEXT FOLLOW-UP DATE:  10/01/2019    Anemia Management Service  Manjula Mckinnon RN  Phone: [phone]  Fax: [phone]

## 2019-09-30 DIAGNOSIS — Z94.0 KIDNEY REPLACED BY TRANSPLANT: ICD-10-CM

## 2019-09-30 DIAGNOSIS — E87.20 METABOLIC ACIDOSIS: Primary | ICD-10-CM

## 2019-09-30 DIAGNOSIS — Z94.0 KIDNEY REPLACED BY TRANSPLANT: Primary | ICD-10-CM

## 2019-09-30 DIAGNOSIS — Z79.899 LONG TERM USE OF DRUG: ICD-10-CM

## 2019-09-30 LAB
ANION GAP SERPL CALCULATED.3IONS-SCNC: 10 MMOL/L (ref 3–14)
BUN SERPL-MCNC: 26 MG/DL (ref 7–30)
CALCIUM SERPL-MCNC: 9.7 MG/DL (ref 8.5–10.1)
CHLORIDE SERPL-SCNC: 113 MMOL/L (ref 94–109)
CO2 SERPL-SCNC: 19 MMOL/L (ref 20–32)
CREAT SERPL-MCNC: 1.22 MG/DL (ref 0.52–1.04)
ERYTHROCYTE [DISTWIDTH] IN BLOOD BY AUTOMATED COUNT: 14.1 % (ref 10–15)
GFR SERPL CREATININE-BSD FRML MDRD: 61 ML/MIN/{1.73_M2}
GLUCOSE SERPL-MCNC: 89 MG/DL (ref 70–99)
HCT VFR BLD AUTO: 29.9 % (ref 35–47)
HGB BLD-MCNC: 9.2 G/DL (ref 11.7–15.7)
MAGNESIUM SERPL-MCNC: 1.4 MG/DL (ref 1.6–2.3)
MCH RBC QN AUTO: 30.8 PG (ref 26.5–33)
MCHC RBC AUTO-ENTMCNC: 30.8 G/DL (ref 31.5–36.5)
MCV RBC AUTO: 100 FL (ref 78–100)
PHOSPHATE SERPL-MCNC: 2.1 MG/DL (ref 2.5–4.5)
PLATELET # BLD AUTO: 178 10E9/L (ref 150–450)
POTASSIUM SERPL-SCNC: 4 MMOL/L (ref 3.4–5.3)
RBC # BLD AUTO: 2.99 10E12/L (ref 3.8–5.2)
SODIUM SERPL-SCNC: 141 MMOL/L (ref 133–144)
TACROLIMUS BLD-MCNC: 8.4 UG/L (ref 5–15)
TME LAST DOSE: NORMAL H
WBC # BLD AUTO: 3.7 10E9/L (ref 4–11)

## 2019-09-30 PROCEDURE — 85027 COMPLETE CBC AUTOMATED: CPT | Performed by: INTERNAL MEDICINE

## 2019-09-30 PROCEDURE — 80197 ASSAY OF TACROLIMUS: CPT | Performed by: INTERNAL MEDICINE

## 2019-09-30 PROCEDURE — 84100 ASSAY OF PHOSPHORUS: CPT | Performed by: INTERNAL MEDICINE

## 2019-09-30 PROCEDURE — 80048 BASIC METABOLIC PNL TOTAL CA: CPT | Performed by: INTERNAL MEDICINE

## 2019-09-30 PROCEDURE — 83735 ASSAY OF MAGNESIUM: CPT | Performed by: INTERNAL MEDICINE

## 2019-09-30 RX ORDER — SODIUM BICARBONATE 650 MG/1
1900 TABLET ORAL 2 TIMES DAILY
Qty: 180 TABLET | Refills: 2 | Status: SHIPPED | OUTPATIENT
Start: 2019-09-30 | End: 2019-12-17

## 2019-09-30 NOTE — TELEPHONE ENCOUNTER
Carbon dioxide level continues to run low - is Mona having diarrhea?  If so, how frequently?    Please INCREASE sodium bicarbonate to a total of 6 tablets daily (1300mg TID OR 1,950mg BID).

## 2019-10-02 ENCOUNTER — TELEPHONE (OUTPATIENT)
Dept: TRANSPLANT | Facility: CLINIC | Age: 29
End: 2019-10-02

## 2019-10-02 NOTE — TELEPHONE ENCOUNTER
Post Kidney and Pancreas Transplant Team Conference  Date: 10/2/2019  Transplant Coordinator: Laurence Vazquez     Attendees:  [x]  Dr. Hill  [x] Leonela Brewster LPN     []  Dr. Wilson [x] Cinthia Cox, ERMELINDA [] Kiersten Chang LPN   [x]  Dr. Rios [] Gely Lynn, RN     [] Keeley Li RN [x] Anthony Hendricks, RhinaD   [] Dr. Johnson [x] Melani Vazquez RN    [] Dr. Durbin [] Virgilio Ryan RN    [] Dr. Pinto [x] Maria Teresa Mayberry, RN    [x] Dr. Delgadillo [] Yesika Wong, ERMELINDA     [] Melanie Ayala, ERMELINDA    [] Surgery Fellow [x] Nancy Maldonado RN    [] Johana Linda, NP [] Nathalia Moreno RN        Verbal Plan Read Back:   No changes at this time    Routed to RN Coordinator   Leonela Brewster LPN

## 2019-10-03 ENCOUNTER — TELEPHONE (OUTPATIENT)
Dept: PHARMACY | Facility: CLINIC | Age: 29
End: 2019-10-03

## 2019-10-03 DIAGNOSIS — Z94.0 KIDNEY REPLACED BY TRANSPLANT: ICD-10-CM

## 2019-10-03 DIAGNOSIS — Z79.899 LONG TERM USE OF DRUG: ICD-10-CM

## 2019-10-03 DIAGNOSIS — Z94.0 KIDNEY REPLACED BY TRANSPLANT: Primary | ICD-10-CM

## 2019-10-03 LAB
ANION GAP SERPL CALCULATED.3IONS-SCNC: 8 MMOL/L (ref 3–14)
BUN SERPL-MCNC: 21 MG/DL (ref 7–30)
CALCIUM SERPL-MCNC: 9.6 MG/DL (ref 8.5–10.1)
CHLORIDE SERPL-SCNC: 112 MMOL/L (ref 94–109)
CO2 SERPL-SCNC: 21 MMOL/L (ref 20–32)
CREAT SERPL-MCNC: 1.08 MG/DL (ref 0.52–1.04)
ERYTHROCYTE [DISTWIDTH] IN BLOOD BY AUTOMATED COUNT: 14 % (ref 10–15)
GFR SERPL CREATININE-BSD FRML MDRD: 70 ML/MIN/{1.73_M2}
GLUCOSE SERPL-MCNC: 93 MG/DL (ref 70–99)
HCT VFR BLD AUTO: 31.2 % (ref 35–47)
HGB BLD-MCNC: 9.5 G/DL (ref 11.7–15.7)
MAGNESIUM SERPL-MCNC: 1.6 MG/DL (ref 1.6–2.3)
MCH RBC QN AUTO: 30.4 PG (ref 26.5–33)
MCHC RBC AUTO-ENTMCNC: 30.4 G/DL (ref 31.5–36.5)
MCV RBC AUTO: 100 FL (ref 78–100)
PHOSPHATE SERPL-MCNC: 1.8 MG/DL (ref 2.5–4.5)
PLATELET # BLD AUTO: 181 10E9/L (ref 150–450)
POTASSIUM SERPL-SCNC: 4.4 MMOL/L (ref 3.4–5.3)
RBC # BLD AUTO: 3.12 10E12/L (ref 3.8–5.2)
SODIUM SERPL-SCNC: 141 MMOL/L (ref 133–144)
TACROLIMUS BLD-MCNC: 8.8 UG/L (ref 5–15)
TME LAST DOSE: NORMAL H
WBC # BLD AUTO: 4.1 10E9/L (ref 4–11)

## 2019-10-03 PROCEDURE — 84100 ASSAY OF PHOSPHORUS: CPT | Performed by: INTERNAL MEDICINE

## 2019-10-03 PROCEDURE — 80048 BASIC METABOLIC PNL TOTAL CA: CPT | Performed by: INTERNAL MEDICINE

## 2019-10-03 PROCEDURE — 80197 ASSAY OF TACROLIMUS: CPT | Performed by: INTERNAL MEDICINE

## 2019-10-03 PROCEDURE — 83735 ASSAY OF MAGNESIUM: CPT | Performed by: INTERNAL MEDICINE

## 2019-10-03 PROCEDURE — 85027 COMPLETE CBC AUTOMATED: CPT | Performed by: INTERNAL MEDICINE

## 2019-10-03 NOTE — TELEPHONE ENCOUNTER
Follow-up with anemia management service:    Scheduled Aranesp appt for Mona on 10/7/2019 at 9am.    Spoke with Mona, she is aware of her appt.         Anemia Latest Ref Rng & Units 2019   CHARLINE Dose - 40mcg - - - - - -   Hemoglobin 11.7 - 15.7 g/dL - 9.1(L) 8.9(L) 9.7(L) 9.5(L) 9.7(L) 9.2(L)   TSAT 15 - 46 % - - - - - - -   Ferritin 12 - 150 ng/mL - - - - - - -       Orders needed to be renewed (for next follow-up date) in EPIC: None   Med order expires: 2020   Lab orders : 2020    Follow-up call date: 10/07/2019        Anemia Management Service  Manjula Mckinnon RN  Phone: [phone]  Fax: [phone]

## 2019-10-04 ENCOUNTER — TELEPHONE (OUTPATIENT)
Dept: TRANSPLANT | Facility: CLINIC | Age: 29
End: 2019-10-04

## 2019-10-04 DIAGNOSIS — Z94.0 KIDNEY REPLACED BY TRANSPLANT: ICD-10-CM

## 2019-10-04 RX ORDER — VALGANCICLOVIR 450 MG/1
900 TABLET, FILM COATED ORAL DAILY
Qty: 60 TABLET | Refills: 1 | Status: SHIPPED | OUTPATIENT
Start: 2019-10-04 | End: 2019-11-05

## 2019-10-04 NOTE — TELEPHONE ENCOUNTER
Phos level continues to run low:  Please call pt and increase dietary intake of phosphorous, goal of 700 mg daily. Best sources are dairy, avocado, nuts, beans, brown or wild rice, and whole grains.   Another source of high phos is hawaiian punch.     Mona declines use of phos supplement, she does have lactose sensitivity, so she will focus on the other suggest foods listed above.     Mona does state that she was having diarrhea, but it is now improved after reducing dairy in her diet.

## 2019-10-07 ENCOUNTER — OFFICE VISIT (OUTPATIENT)
Dept: NEPHROLOGY | Facility: CLINIC | Age: 29
End: 2019-10-07
Attending: INTERNAL MEDICINE
Payer: MEDICARE

## 2019-10-07 ENCOUNTER — OFFICE VISIT (OUTPATIENT)
Dept: PHARMACY | Facility: CLINIC | Age: 29
End: 2019-10-07
Payer: MEDICAID

## 2019-10-07 VITALS
BODY MASS INDEX: 22.36 KG/M2 | DIASTOLIC BLOOD PRESSURE: 83 MMHG | OXYGEN SATURATION: 100 % | HEART RATE: 87 BPM | SYSTOLIC BLOOD PRESSURE: 129 MMHG | WEIGHT: 114.5 LBS | TEMPERATURE: 98.3 F

## 2019-10-07 DIAGNOSIS — N18.30 ANEMIA IN STAGE 3 CHRONIC KIDNEY DISEASE (H): ICD-10-CM

## 2019-10-07 DIAGNOSIS — I15.1 HTN, KIDNEY TRANSPLANT RELATED: Primary | ICD-10-CM

## 2019-10-07 DIAGNOSIS — I15.1 HTN, KIDNEY TRANSPLANT RELATED: ICD-10-CM

## 2019-10-07 DIAGNOSIS — Z48.298 AFTERCARE FOLLOWING ORGAN TRANSPLANT: ICD-10-CM

## 2019-10-07 DIAGNOSIS — E83.42 HYPOMAGNESEMIA: ICD-10-CM

## 2019-10-07 DIAGNOSIS — Z94.0 HTN, KIDNEY TRANSPLANT RELATED: ICD-10-CM

## 2019-10-07 DIAGNOSIS — Z94.0 KIDNEY REPLACED BY TRANSPLANT: ICD-10-CM

## 2019-10-07 DIAGNOSIS — Z23 NEED FOR PROPHYLACTIC VACCINATION AND INOCULATION AGAINST INFLUENZA: Primary | ICD-10-CM

## 2019-10-07 DIAGNOSIS — Z94.0 HTN, KIDNEY TRANSPLANT RELATED: Primary | ICD-10-CM

## 2019-10-07 DIAGNOSIS — E55.9 VITAMIN D DEFICIENCY: ICD-10-CM

## 2019-10-07 DIAGNOSIS — R19.7 DIARRHEA, UNSPECIFIED TYPE: ICD-10-CM

## 2019-10-07 DIAGNOSIS — Z94.0 KIDNEY TRANSPLANTED: Primary | ICD-10-CM

## 2019-10-07 DIAGNOSIS — Z94.0 KIDNEY REPLACED BY TRANSPLANT: Primary | ICD-10-CM

## 2019-10-07 DIAGNOSIS — Z79.899 LONG TERM USE OF DRUG: ICD-10-CM

## 2019-10-07 DIAGNOSIS — D63.1 ANEMIA IN STAGE 3 CHRONIC KIDNEY DISEASE (H): ICD-10-CM

## 2019-10-07 DIAGNOSIS — Z78.9 USES BIRTH CONTROL: ICD-10-CM

## 2019-10-07 DIAGNOSIS — N18.30 ANEMIA OF CHRONIC RENAL FAILURE, STAGE 3 (MODERATE) (H): ICD-10-CM

## 2019-10-07 DIAGNOSIS — D63.1 ANEMIA OF CHRONIC RENAL FAILURE, STAGE 3 (MODERATE) (H): ICD-10-CM

## 2019-10-07 DIAGNOSIS — N25.81 SECONDARY RENAL HYPERPARATHYROIDISM (H): ICD-10-CM

## 2019-10-07 DIAGNOSIS — N18.30 CKD (CHRONIC KIDNEY DISEASE) STAGE 3, GFR 30-59 ML/MIN (H): ICD-10-CM

## 2019-10-07 DIAGNOSIS — E78.5 HYPERLIPIDEMIA LDL GOAL <100: ICD-10-CM

## 2019-10-07 DIAGNOSIS — D84.9 IMMUNOSUPPRESSION (H): ICD-10-CM

## 2019-10-07 PROBLEM — T86.19 DELAYED GRAFT FUNCTION OF KIDNEY: Status: RESOLVED | Noted: 2019-08-08 | Resolved: 2019-10-07

## 2019-10-07 PROBLEM — N17.9 ACUTE KIDNEY FAILURE, UNSPECIFIED (H): Status: RESOLVED | Noted: 2019-08-15 | Resolved: 2019-10-07

## 2019-10-07 LAB
ANION GAP SERPL CALCULATED.3IONS-SCNC: 6 MMOL/L (ref 3–14)
BUN SERPL-MCNC: 22 MG/DL (ref 7–30)
CALCIUM SERPL-MCNC: 10 MG/DL (ref 8.5–10.1)
CHLORIDE SERPL-SCNC: 111 MMOL/L (ref 94–109)
CO2 SERPL-SCNC: 23 MMOL/L (ref 20–32)
CREAT SERPL-MCNC: 1.12 MG/DL (ref 0.52–1.04)
ERYTHROCYTE [DISTWIDTH] IN BLOOD BY AUTOMATED COUNT: 13.2 % (ref 10–15)
GFR SERPL CREATININE-BSD FRML MDRD: 67 ML/MIN/{1.73_M2}
GLUCOSE SERPL-MCNC: 92 MG/DL (ref 70–99)
HCT VFR BLD AUTO: 29.5 % (ref 35–47)
HGB BLD-MCNC: 9.3 G/DL (ref 11.7–15.7)
MAGNESIUM SERPL-MCNC: 1.3 MG/DL (ref 1.6–2.3)
MCH RBC QN AUTO: 31 PG (ref 26.5–33)
MCHC RBC AUTO-ENTMCNC: 31.5 G/DL (ref 31.5–36.5)
MCV RBC AUTO: 98 FL (ref 78–100)
PHOSPHATE SERPL-MCNC: 2 MG/DL (ref 2.5–4.5)
PLATELET # BLD AUTO: 165 10E9/L (ref 150–450)
POTASSIUM SERPL-SCNC: 4.2 MMOL/L (ref 3.4–5.3)
RBC # BLD AUTO: 3 10E12/L (ref 3.8–5.2)
SODIUM SERPL-SCNC: 140 MMOL/L (ref 133–144)
TACROLIMUS BLD-MCNC: 8.4 UG/L (ref 5–15)
TME LAST DOSE: NORMAL H
WBC # BLD AUTO: 3.1 10E9/L (ref 4–11)

## 2019-10-07 PROCEDURE — 96372 THER/PROPH/DIAG INJ SC/IM: CPT | Mod: ZF

## 2019-10-07 PROCEDURE — 90686 IIV4 VACC NO PRSV 0.5 ML IM: CPT | Mod: ZF | Performed by: GENERAL ACUTE CARE HOSPITAL

## 2019-10-07 PROCEDURE — 83735 ASSAY OF MAGNESIUM: CPT | Performed by: INTERNAL MEDICINE

## 2019-10-07 PROCEDURE — 25000128 H RX IP 250 OP 636: Mod: ZF | Performed by: GENERAL ACUTE CARE HOSPITAL

## 2019-10-07 PROCEDURE — 87799 DETECT AGENT NOS DNA QUANT: CPT | Performed by: INTERNAL MEDICINE

## 2019-10-07 PROCEDURE — 80197 ASSAY OF TACROLIMUS: CPT | Performed by: INTERNAL MEDICINE

## 2019-10-07 PROCEDURE — 99606 MTMS BY PHARM EST 15 MIN: CPT | Mod: GY | Performed by: PHARMACIST

## 2019-10-07 PROCEDURE — G0008 ADMIN INFLUENZA VIRUS VAC: HCPCS | Mod: ZF

## 2019-10-07 PROCEDURE — 99607 MTMS BY PHARM ADDL 15 MIN: CPT | Mod: GY | Performed by: PHARMACIST

## 2019-10-07 PROCEDURE — 25000128 H RX IP 250 OP 636: Mod: ZF | Performed by: INTERNAL MEDICINE

## 2019-10-07 PROCEDURE — 80048 BASIC METABOLIC PNL TOTAL CA: CPT | Performed by: INTERNAL MEDICINE

## 2019-10-07 PROCEDURE — 85027 COMPLETE CBC AUTOMATED: CPT | Performed by: INTERNAL MEDICINE

## 2019-10-07 PROCEDURE — G0463 HOSPITAL OUTPT CLINIC VISIT: HCPCS | Mod: 25,ZF

## 2019-10-07 PROCEDURE — 84100 ASSAY OF PHOSPHORUS: CPT | Performed by: INTERNAL MEDICINE

## 2019-10-07 RX ORDER — MAGNESIUM OXIDE 400 MG/1
400 TABLET ORAL 2 TIMES DAILY
Qty: 180 TABLET | Refills: 0 | Status: SHIPPED | OUTPATIENT
Start: 2019-10-07 | End: 2020-04-16

## 2019-10-07 RX ORDER — MAGNESIUM OXIDE 400 MG/1
400 TABLET ORAL 2 TIMES DAILY
Qty: 180 TABLET | Refills: 1 | Status: SHIPPED | OUTPATIENT
Start: 2019-10-07 | End: 2019-10-07

## 2019-10-07 RX ADMIN — DARBEPOETIN ALFA 40 MCG: 40 INJECTION, SOLUTION INTRAVENOUS; SUBCUTANEOUS at 15:14

## 2019-10-07 RX ADMIN — INFLUENZA A VIRUS A/BRISBANE/02/2018 IVR-190 (H1N1) ANTIGEN (FORMALDEHYDE INACTIVATED), INFLUENZA A VIRUS A/KANSAS/14/2017 X-327 (H3N2) ANTIGEN (FORMALDEHYDE INACTIVATED), INFLUENZA B VIRUS B/PHUKET/3073/2013 ANTIGEN (FORMALDEHYDE INACTIVATED), AND INFLUENZA B VIRUS B/MARYLAND/15/2016 BX-69A ANTIGEN (FORMALDEHYDE INACTIVATED) 0.5 ML: 15; 15; 15; 15 INJECTION, SUSPENSION INTRAMUSCULAR at 12:23

## 2019-10-07 ASSESSMENT — PAIN SCALES - GENERAL: PAINLEVEL: NO PAIN (0)

## 2019-10-07 NOTE — TELEPHONE ENCOUNTER
"Notes recorded by Nakul Hill MD on 10/7/2019 at 12:21 PM CDT    \"Stable kidney function, but low serum magnesium level.  Recommend increasing magnesium oxide to 400 mg twice daily.\"    Please notify [facility] if any diarrhea after increasing mag supplement.  "

## 2019-10-07 NOTE — TELEPHONE ENCOUNTER
Spoke to patient who verbalizes understanding to increase Magnesium Oxide level to 400 mg BID.  Patient agrees to notify txp team if any diarrhea occurs after dose increase.

## 2019-10-07 NOTE — PATIENT INSTRUCTIONS
Recommendations from today's MTM visit:                                                      1. Call me if your magnesium increase causes more diarrhea. We will switch formulations of this if it is worsening it.     2. Use metamucil and probiotic every day to help with your diarrhea.     3. Valcyte therapy will be complete on 11/3/19. Call Melani to let her know you have stopped this at that time.     It was great to speak with you today.  I value your experience and would be very thankful for your time with providing feedback on our clinic survey. You may receive a survey via email or text message in the next few days.     Next MTM visit: 12/3/19    To schedule another MTM appointment, please call the clinic directly or you may call the MTM scheduling line at [phone] or toll-free at [phone].     My Clinical Pharmacist's contact information:                                                      It was a pleasure talking with you today!  Please feel free to contact me with any questions or concerns you have.      Anthony Hendricks, PharmCARLOS A  MTM Pharmacist    Phone: [phone]

## 2019-10-07 NOTE — LETTER
10/7/2019      RE: Mona Wagner  [address]  [place] [address]       ACUTE TRANSPLANT NEPHROLOGY VISIT    Today Recommendations:  - Flu vaccination today.  - Aranesp dose today.  - Taper down Fludrocortisone 0.1 mg 3 times a week, Mon, Wed and Frid.   - Return to clinic in 2 months per schedule.     Assessment & Plan   #  DDKT:  Is improving.               - Baseline Cr ~  1.1-1.3 mg/dl              - Today at baseline: 1.12 mg/dl.               - Proteinuria: Not checked post transplant              - Date DSA Last Checked: Sep/12/2019      Latest DSA: No              - BK Viremia: No              - Kidney Tx Biopsy: Sep 17, 2019; Result: No diagnostic evidence of acute rejection.     # Immunosuppression: Tacrolimus immediate release (goal 8-10) and Mycophenolate mofetil (goal 1-3.5)              - Changes: No     # Prophylaxis:              - PJP: Inhaled pentamidine, due next week.              - CMV: Valcyte, 900 mg daily.               - Thrush: None     # Hypertension: Controlled; Goal BP: < 140/90.              - Volume status: Euvolemic              - Changes: Yes, will decrease Florinef to 0.1 mg 3x per week (MWF).     # Anemia in Chronic Renal Disease:   - Hgb: Stable at range of 9 g/dl.   -  CHARLINE:  Yes, Aranesp dose today.               - Iron studies: Replete     # Mineral Bone Disorder:   - Secondary renal hyperparathyroidism; PTH level: M oderately elevated (301-600 pg/ml) and will continue on cinacalcet at the same dose for now.  - Vitamin D; level:  Low, but with hypercalcemia, would hold off on starting cholecalciferol.  - Calcium; level: Normal  - Phosphorus; level: Low  and discussed increased dietary phosphorus.     # Electrolytes:   - Potassium; level: Normal  - Magnesium; level: Low  and will increase magnesium oxide 400 mg bid.  - Bicarbonate; level: Normal  and will continue on sodium bicarbonate supplement.     # Facial Acne:   New onset, non itchy. Likely prednisone induced. If no  resolution, consider dermatology consult.      # Medical Compliance: Yes     # Transplant History:  Etiology of kidney failure: IgA nephropathy  Tx: D DKT  Transplant: 8/3/2019 (Kidney)  Donor Type:  - Cardiac Death        Donor Class: Standard Criteria Donor  Crossmatch at time of Tx: negative  DSA at time of Tx: No  Significant changes in immunosuppression: None  CMV IgG Ab High Risk Discordance (D+/R-): No  EBV IgG Ab High Risk Discordance (D+/R-): No  Significant transplant-related complications: None    Transplant Office Phone Number: [phone]    Assessment and plan was discussed with the patient and she voiced her understanding and agreement.    Return visit: Per schedule.    Patient was seen and discussed with Dr. Hill.     Meggan Birch MD   Nephrology Fellow  Pager: [phone]    Attestation:  This patient has been seen and evaluated by me, Nakul Hill MD.  I have reviewed the note and agree with plan of care as documented by the fellow.       Chief Complaint   Ms. Wagner is a 28 year old here for routine follow up.     History of Present Illness   Mrs. Wagner is 28 year old female with PMHx of DDKT on 8/3/2019 secondary to IgA nephropathy, secondary hyperparathyroidism, vitamin D deficiency, anemia who presented to the clinic today for routine follow up. About a month post transplant patient presented to the ED complaining of hematuria and flank pain so graft biopsy was done with normal findings, later the hematuria and pain resolved. Patient has been doing well, crea improved, Crea today at baseline which we believe 1.1-1.3 mg/dl. Patient is compliant weith her medication and appointment follow up. Today patient complains of acne on her face that started a month ago or so. She denies any chest pain, shortness of breath, nausea, vomiting, abdominal pain, headache, lightheadedness, flank pain, diarrhea, constipation, fever or urinary problems.     Recent Hospitalizations:  [x] No []  Yes    New Medical Issues: [x] No [] Yes    Decreased energy: [x] No [] Yes    Chest pain or SOB with exertion:  [x] No [] Yes    Appetite change or weight change: [x] No [] Yes    Nausea, vomiting or diarrhea:  [x] No [] Yes    Fever, sweats or chills: [x] No [] Yes    Leg swelling: [x] No [] Yes      Home BP: 110s/70s    Review of Systems   A comprehensive review of systems was obtained and negative, except as noted in the HPI or PMH.    Problem List   Patient Active Problem List   Diagnosis     DVT of upper extremity (deep vein thrombosis) (H)     Seizure disorder (H)     HTN, kidney transplant related     Other general symptoms(780.99)     Galactorrhea     Acquired hypothyroidism     Anemia in chronic kidney disease     Increased prolactin level (H)     Normocytic anemia     Thrombocytopenia (H)     Infective endocarditis     Aftercare following organ transplant     Immunosuppression (H)     Delayed graft function of kidney     Methemoglobinemia     Kidney replaced by transplant     Anxiety     Secondary renal hyperparathyroidism (H)     Vitamin D deficiency     Acute kidney failure, unspecified (H)     CKD (chronic kidney disease) stage 3, GFR 30-59 ml/min (H)     Anemia of chronic renal failure, stage 3 (moderate) (H)       Social History   Social History     Tobacco Use     Smoking status: Never Smoker     Smokeless tobacco: Never Used   Substance Use Topics     Alcohol use: No     Alcohol/week: 0.0 standard drinks     Drug use: No       Allergies   Allergies   Allergen Reactions     Chlorhexidine Rash     Rash at site     Sulfa Drugs Rash     Muscle stiffness of neck     Dapsone      Methemoglobinemia     Furosemide Other (See Comments)     Skin flushing     Lisinopril Swelling     angioedema       Medications   Current Outpatient Medications   Medication Sig     acetaminophen (TYLENOL) 325 MG tablet Take 2 tablets (650 mg) by mouth every 4 hours as needed for mild pain     aspirin (ASA) 81 MG chewable  tablet Take 1 tablet (81 mg) by mouth daily     atorvastatin (LIPITOR) 10 MG tablet Take 1 tablet (10 mg) by mouth daily     cinacalcet (SENSIPAR) 30 MG tablet Take 1 tablet (30 mg) by mouth daily     diphenhydrAMINE (BENADRYL) 25 MG tablet Take 1-2 tablets (25-50 mg) by mouth every 6 hours as needed for itching or allergies     EPINEPHrine (EPIPEN/ADRENACLICK/OR ANY BX GENERIC EQUIV) 0.3 MG/0.3ML injection 2-pack Inject 0.3 mLs (0.3 mg) into the muscle as needed for anaphylaxis     fludrocortisone (FLORINEF) 0.1 MG tablet Take 1 tablet (0.1 mg) by mouth daily     hydrOXYzine (ATARAX) 10 MG tablet Take 1 tablet (10 mg) by mouth 3 times daily as needed for anxiety     lactobacillus rhamnosus, GG, (CULTURELL) capsule Take 1 capsule by mouth 2 times daily     levothyroxine (SYNTHROID/LEVOTHROID) 25 MCG tablet Take 1 tablet (25 mcg) Tuesday, Thursday, Saturday and Sunday and 1.5 tablets (37.5 mcg) on Monday, Wednesday and Friday every week.     magnesium oxide (MAG-OX) 400 MG tablet Take 1 tablet (400 mg) by mouth daily     multivitamin RENAL (NEPHROCAPS/TRIPHROCAPS) 1 MG capsule Take 1 capsule by mouth daily     MYFORTIC (BRAND) 180 MG EC tablet Take 3 tablets (540 mg) by mouth 2 times daily     norethindrone (MICRONOR) 0.35 MG tablet Do not restart until your follow up appointment with your surgeon. Discuss restart date at that appointment. (Patient not taking: Reported on 9/3/2019)     ondansetron (ZOFRAN ODT) 4 MG ODT tab Take 1-2 tablets (4-8 mg) by mouth every 8 hours as needed for nausea     pentamidine (NEBUPENT) 300 MG neb solution Inhale 300 mg into the lungs every 28 days     psyllium (METAMUCIL/KONSYL) capsule Take 1 capsule by mouth daily     simethicone (MYLICON) 125 MG chewable tablet Take 1 tablet (125 mg) by mouth 4 times daily as needed for intestinal gas     sodium bicarbonate 650 MG tablet Take 3 tablets (1,950 mg) by mouth 2 times daily     tacrolimus (GENERIC EQUIVALENT) 0.5 MG capsule HOLD for  dose change     tacrolimus (GENERIC EQUIVALENT) 1 MG capsule Take 3 capsules (3 mg) by mouth 2 times daily Total dose = 8mg     tacrolimus (GENERIC EQUIVALENT) 5 MG capsule Take 1 capsule (5 mg) by mouth 2 times daily Total dose = 8mg     valGANciclovir (VALCYTE) 450 MG tablet Take 2 tablets (900 mg) by mouth daily     vitamin D3 (CHOLECALCIFEROL) 2000 units (50 mcg) tablet Take 1 tablet (2,000 Units) by mouth daily     No current facility-administered medications for this visit.      There are no discontinued medications.    Physical Exam   Vital Signs: /83   Pulse 87   Temp 98.3  F (36.8  C)   Wt 51.9 kg (114 lb 8 oz)   SpO2 100%   BMI 22.36 kg/m       GENERAL APPEARANCE: alert and no distress  HENT: mouth without ulcers or lesions  LYMPHATICS: no cervical or supraclavicular nodes  RESP: lungs clear to auscultation - no rales, rhonchi or wheezes  CV: regular rhythm, normal rate, no rub, no murmur  EDEMA: no LE edema bilaterally  ABDOMEN: soft, nondistended, nontender, bowel sounds normal  MS: extremities normal - no gross deformities noted, no evidence of inflammation in joints, no muscle tenderness  SKIN: no rash  TX KIDNEY: normal  DIALYSIS ACCESS:  LUE AV Fistula normal bruit and thrill    Data       I personally reviewed her labs.     Renal Latest Ref Rng & Units 10/3/2019 9/30/2019 9/26/2019   Na 133 - 144 mmol/L 141 141 140   Na (external) 136 - 145 mmol/L - - -   K 3.4 - 5.3 mmol/L 4.4 4.0 4.5   K (external) 3.5 - 5.0 mmol/L - - -   Cl 94 - 109 mmol/L 112(H) 113(H) 110(H)   Cl (external) 98 - 107 mmol/L - - -   CO2 20 - 32 mmol/L 21 19(L) 21   CO2 (external) 22 - 31 mmol/L - - -   BUN 7 - 30 mg/dL 21 26 20   BUN (external) 8 - 22 mg/dL - - -   Cr 0.52 - 1.04 mg/dL 1.08(H) 1.22(H) 1.12(H)   Cr (external) 0.60 - 1 mg/dL - - -   Glucose 70 - 99 mg/dL 93 89 91   Glucose (external) 70 - 125 mg/dL - - -   Ca  8.5 - 10.1 mg/dL 9.6 9.7 10.2(H)   Ca (external) 8.5 - 10.5 mg/dL - - -   Mg 1.6 - 2.3 mg/dL  1.6 1.4(L) 1.7   Mg (external) 1.8 - 2.6 mg/dL - - -     Bone Health Latest Ref Rng & Units 10/3/2019 9/30/2019 9/26/2019   Phos 2.5 - 4.5 mg/dL 1.8(L) 2.1(L) 1.9(L)   Phos (external) 2.5 - 4.5 mg/dL - - -   PTHi 18 - 80 pg/mL - - -   PTHi (external) 18 - 80 pg/mL - - -   Vit D Def 20 - 75 ug/L - - -     Heme Latest Ref Rng & Units 10/3/2019 9/30/2019 9/26/2019   WBC 4.0 - 11.0 10e9/L 4.1 3.7(L) 3.3(L)   WBC (external) 4.0 - 11.0 thou/uL - - -   Hgb 11.7 - 15.7 g/dL 9.5(L) 9.2(L) 9.7(L)   Hgb (external) 12.0 - 16.0 g/dL - - -   Plt 150 - 450 10e9/L 181 178 181   Plt (external) 140 - 440 thou/uL - - -     Liver Latest Ref Rng & Units 8/30/2019 8/2/2019 1/15/2019   AP 40 - 150 U/L 167(H) 65 -   AP (external) 34 - 104 U/L - - -   TBili 0.2 - 1.3 mg/dL 0.3 0.8 0.9   ALT 0 - 50 U/L 16 13 -   ALT (external) 7 - 52 U/L - - -   AST 0 - 45 U/L <3 4 -   AST (external) 13 - 39 U/L - - -   Tot Protein 6.8 - 8.8 g/dL 7.2 8.6 7.3   Tot Protein (external) 6.4 - 8.9 g/dL - - -   Albumin 3.4 - 5.0 g/dL 3.9 4.2 3.5   Albumin (external) - - - -     Pancreas Latest Ref Rng & Units 8/3/2019 8/2/2019   A1C 0 - 5.6 % 4.8 4.8     Iron studies Latest Ref Rng & Units 9/3/2019 8/30/2019 8/12/2019   Iron 35 - 180 ug/dL 87 138 96   Iron sat 15 - 46 % 34 51(H) 59(H)   Ferritin 12 - 150 ng/mL 1,535(H) 1,746(H) -   Ferritin (external) 10 - 291 ng/mL - - -     [facility] Txp Virology Latest Ref Rng & Units 9/17/2019 9/3/2019 12/12/2013   BK Spec - Plasma Plasma -   BK Res BKNEG:BK Virus DNA Not Detected copies/mL BK Virus DNA Not Detected BK Virus DNA Not Detected -   BK Log <2.7 Log copies/mL Not Calculated Not Calculated -   Hep B Surf - - - 34.4        Recent Labs   Lab Test 09/26/19  0848 09/30/19  0838 10/03/19  0836   DOSTAC 09/25/19@2030 NTP [id number]   TACROL 9.4 8.4 8.8     Recent Labs   Lab Test 08/16/19  0807 09/03/19  0944   DOSMPA Not Provided 0840pm   MPACID 1.92 3.39   MPAG 149.2* 52.8     Meggan Birch M.D.  Nephrology  Fellow  Pager: [phone]    Nakul Hill MD

## 2019-10-07 NOTE — NURSING NOTE
Chief Complaint   Patient presents with     RECHECK     Follow up Kidney TX       .Vital signs:  Temp: 98.3  F (36.8  C)   BP: 129/83 Pulse: 87     SpO2: 100 %       Weight: 51.9 kg (114 lb 8 oz)  Estimated body mass index is 22.36 kg/m  as calculated from the following:    Height as of 9/17/19: 1.524 m (5').    Weight as of this encounter: 51.9 kg (114 lb 8 oz).        Jaz Dangelo, CMA

## 2019-10-07 NOTE — PROGRESS NOTES
ACUTE TRANSPLANT NEPHROLOGY VISIT    Today Recommendations:  - Flu vaccination today.  - Aranesp dose today.  - Taper down Fludrocortisone 0.1 mg 3 times a week, Mon, Wed and Frid.   - Return to clinic in 2 months per schedule.     Assessment & Plan   # DDKT: Is improving.               - Baseline Cr ~ 1.1-1.3 mg/dl              - Today at baseline: 1.12 mg/dl.               - Proteinuria: Not checked post transplant              - Date DSA Last Checked: Sep/12/2019      Latest DSA: No              - BK Viremia: No              - Kidney Tx Biopsy: Sep 17, 2019; Result: No diagnostic evidence of acute rejection.     # Immunosuppression: Tacrolimus immediate release (goal 8-10) and Mycophenolate mofetil (goal 1-3.5)              - Changes: No     # Prophylaxis:              - PJP: Inhaled pentamidine, due next week.              - CMV: Valcyte, 900 mg daily.               - Thrush: None     # Hypertension: Controlled; Goal BP: < 140/90.              - Volume status: Euvolemic              - Changes: Yes, will decrease Florinef to 0.1 mg 3x per week (MWF).     # Anemia in Chronic Renal Disease:   - Hgb: Stable at range of 9 g/dl.   -  CHARLINE: Yes, Aranesp dose today.               - Iron studies: Replete     # Mineral Bone Disorder:   - Secondary renal hyperparathyroidism; PTH level: Moderately elevated (301-600 pg/ml) and will continue on cinacalcet at the same dose for now.  - Vitamin D; level: Low, but with hypercalcemia, would hold off on starting cholecalciferol.  - Calcium; level: Normal  - Phosphorus; level: Low and discussed increased dietary phosphorus.     # Electrolytes:   - Potassium; level: Normal  - Magnesium; level: Low and will increase magnesium oxide 400 mg bid.  - Bicarbonate; level: Normal and will continue on sodium bicarbonate supplement.     # Facial Acne:   New onset, non itchy. Likely prednisone induced. If no resolution, consider dermatology consult.      # Medical Compliance: Yes     #  Transplant History:  Etiology of kidney failure: IgA nephropathy  Tx: DDKT  Transplant: 8/3/2019 (Kidney)  Donor Type:  - Cardiac Death        Donor Class: Standard Criteria Donor  Crossmatch at time of Tx: negative  DSA at time of Tx: No  Significant changes in immunosuppression: None  CMV IgG Ab High Risk Discordance (D+/R-): No  EBV IgG Ab High Risk Discordance (D+/R-): No  Significant transplant-related complications: None    Transplant Office Phone Number: [phone]    Assessment and plan was discussed with the patient and she voiced her understanding and agreement.    Return visit: Per schedule.    Patient was seen and discussed with Dr. Hill.     Meggan Birch MD   Nephrology Fellow  Pager: [phone]    Attestation:  This patient has been seen and evaluated by me, Nakul Hill MD.  I have reviewed the note and agree with plan of care as documented by the fellow.       Chief Complaint   Ms. Wagner is a 28 year old here for routine follow up.     History of Present Illness   Mrs. Wagner is 28 year old female with PMHx of DDKT on 8/3/2019 secondary to IgA nephropathy, secondary hyperparathyroidism, vitamin D deficiency, anemia who presented to the clinic today for routine follow up. About a month post transplant patient presented to the ED complaining of hematuria and flank pain so graft biopsy was done with normal findings, later the hematuria and pain resolved. Patient has been doing well, crea improved, Crea today at baseline which we believe 1.1-1.3 mg/dl. Patient is compliant weith her medication and appointment follow up. Today patient complains of acne on her face that started a month ago or so. She denies any chest pain, shortness of breath, nausea, vomiting, abdominal pain, headache, lightheadedness, flank pain, diarrhea, constipation, fever or urinary problems.     Recent Hospitalizations:  [x] No [] Yes    New Medical Issues: [x] No [] Yes    Decreased energy: [x] No [] Yes     Chest pain or SOB with exertion:  [x] No [] Yes    Appetite change or weight change: [x] No [] Yes    Nausea, vomiting or diarrhea:  [x] No [] Yes    Fever, sweats or chills: [x] No [] Yes    Leg swelling: [x] No [] Yes      Home BP: 110s/70s    Review of Systems   A comprehensive review of systems was obtained and negative, except as noted in the HPI or PMH.    Problem List   Patient Active Problem List   Diagnosis     DVT of upper extremity (deep vein thrombosis) (H)     Seizure disorder (H)     HTN, kidney transplant related     Other general symptoms(780.99)     Galactorrhea     Acquired hypothyroidism     Anemia in chronic kidney disease     Increased prolactin level (H)     Normocytic anemia     Thrombocytopenia (H)     Infective endocarditis     Aftercare following organ transplant     Immunosuppression (H)     Delayed graft function of kidney     Methemoglobinemia     Kidney replaced by transplant     Anxiety     Secondary renal hyperparathyroidism (H)     Vitamin D deficiency     Acute kidney failure, unspecified (H)     CKD (chronic kidney disease) stage 3, GFR 30-59 ml/min (H)     Anemia of chronic renal failure, stage 3 (moderate) (H)       Social History   Social History     Tobacco Use     Smoking status: Never Smoker     Smokeless tobacco: Never Used   Substance Use Topics     Alcohol use: No     Alcohol/week: 0.0 standard drinks     Drug use: No       Allergies   Allergies   Allergen Reactions     Chlorhexidine Rash     Rash at site     Sulfa Drugs Rash     Muscle stiffness of neck     Dapsone      Methemoglobinemia     Furosemide Other (See Comments)     Skin flushing     Lisinopril Swelling     angioedema       Medications   Current Outpatient Medications   Medication Sig     acetaminophen (TYLENOL) 325 MG tablet Take 2 tablets (650 mg) by mouth every 4 hours as needed for mild pain     aspirin (ASA) 81 MG chewable tablet Take 1 tablet (81 mg) by mouth daily     atorvastatin (LIPITOR) 10 MG  tablet Take 1 tablet (10 mg) by mouth daily     cinacalcet (SENSIPAR) 30 MG tablet Take 1 tablet (30 mg) by mouth daily     diphenhydrAMINE (BENADRYL) 25 MG tablet Take 1-2 tablets (25-50 mg) by mouth every 6 hours as needed for itching or allergies     EPINEPHrine (EPIPEN/ADRENACLICK/OR ANY BX GENERIC EQUIV) 0.3 MG/0.3ML injection 2-pack Inject 0.3 mLs (0.3 mg) into the muscle as needed for anaphylaxis     fludrocortisone (FLORINEF) 0.1 MG tablet Take 1 tablet (0.1 mg) by mouth daily     hydrOXYzine (ATARAX) 10 MG tablet Take 1 tablet (10 mg) by mouth 3 times daily as needed for anxiety     lactobacillus rhamnosus, GG, (CULTURELL) capsule Take 1 capsule by mouth 2 times daily     levothyroxine (SYNTHROID/LEVOTHROID) 25 MCG tablet Take 1 tablet (25 mcg) Tuesday, Thursday, Saturday and Sunday and 1.5 tablets (37.5 mcg) on Monday, Wednesday and Friday every week.     magnesium oxide (MAG-OX) 400 MG tablet Take 1 tablet (400 mg) by mouth daily     multivitamin RENAL (NEPHROCAPS/TRIPHROCAPS) 1 MG capsule Take 1 capsule by mouth daily     MYFORTIC (BRAND) 180 MG EC tablet Take 3 tablets (540 mg) by mouth 2 times daily     norethindrone (MICRONOR) 0.35 MG tablet Do not restart until your follow up appointment with your surgeon. Discuss restart date at that appointment. (Patient not taking: Reported on 9/3/2019)     ondansetron (ZOFRAN ODT) 4 MG ODT tab Take 1-2 tablets (4-8 mg) by mouth every 8 hours as needed for nausea     pentamidine (NEBUPENT) 300 MG neb solution Inhale 300 mg into the lungs every 28 days     psyllium (METAMUCIL/KONSYL) capsule Take 1 capsule by mouth daily     simethicone (MYLICON) 125 MG chewable tablet Take 1 tablet (125 mg) by mouth 4 times daily as needed for intestinal gas     sodium bicarbonate 650 MG tablet Take 3 tablets (1,950 mg) by mouth 2 times daily     tacrolimus (GENERIC EQUIVALENT) 0.5 MG capsule HOLD for dose change     tacrolimus (GENERIC EQUIVALENT) 1 MG capsule Take 3 capsules (3  mg) by mouth 2 times daily Total dose = 8mg     tacrolimus (GENERIC EQUIVALENT) 5 MG capsule Take 1 capsule (5 mg) by mouth 2 times daily Total dose = 8mg     valGANciclovir (VALCYTE) 450 MG tablet Take 2 tablets (900 mg) by mouth daily     vitamin D3 (CHOLECALCIFEROL) 2000 units (50 mcg) tablet Take 1 tablet (2,000 Units) by mouth daily     No current facility-administered medications for this visit.      There are no discontinued medications.    Physical Exam   Vital Signs: /83   Pulse 87   Temp 98.3  F (36.8  C)   Wt 51.9 kg (114 lb 8 oz)   SpO2 100%   BMI 22.36 kg/m      GENERAL APPEARANCE: alert and no distress  HENT: mouth without ulcers or lesions  LYMPHATICS: no cervical or supraclavicular nodes  RESP: lungs clear to auscultation - no rales, rhonchi or wheezes  CV: regular rhythm, normal rate, no rub, no murmur  EDEMA: no LE edema bilaterally  ABDOMEN: soft, nondistended, nontender, bowel sounds normal  MS: extremities normal - no gross deformities noted, no evidence of inflammation in joints, no muscle tenderness  SKIN: no rash  TX KIDNEY: normal  DIALYSIS ACCESS:  LUE AV Fistula normal bruit and thrill    Data       I personally reviewed her labs.     Renal Latest Ref Rng & Units 10/3/2019 9/30/2019 9/26/2019   Na 133 - 144 mmol/L 141 141 140   Na (external) 136 - 145 mmol/L - - -   K 3.4 - 5.3 mmol/L 4.4 4.0 4.5   K (external) 3.5 - 5.0 mmol/L - - -   Cl 94 - 109 mmol/L 112(H) 113(H) 110(H)   Cl (external) 98 - 107 mmol/L - - -   CO2 20 - 32 mmol/L 21 19(L) 21   CO2 (external) 22 - 31 mmol/L - - -   BUN 7 - 30 mg/dL 21 26 20   BUN (external) 8 - 22 mg/dL - - -   Cr 0.52 - 1.04 mg/dL 1.08(H) 1.22(H) 1.12(H)   Cr (external) 0.60 - 1 mg/dL - - -   Glucose 70 - 99 mg/dL 93 89 91   Glucose (external) 70 - 125 mg/dL - - -   Ca  8.5 - 10.1 mg/dL 9.6 9.7 10.2(H)   Ca (external) 8.5 - 10.5 mg/dL - - -   Mg 1.6 - 2.3 mg/dL 1.6 1.4(L) 1.7   Mg (external) 1.8 - 2.6 mg/dL - - -     Bone Health Latest Ref  Rng & Units 10/3/2019 9/30/2019 9/26/2019   Phos 2.5 - 4.5 mg/dL 1.8(L) 2.1(L) 1.9(L)   Phos (external) 2.5 - 4.5 mg/dL - - -   PTHi 18 - 80 pg/mL - - -   PTHi (external) 18 - 80 pg/mL - - -   Vit D Def 20 - 75 ug/L - - -     Heme Latest Ref Rng & Units 10/3/2019 9/30/2019 9/26/2019   WBC 4.0 - 11.0 10e9/L 4.1 3.7(L) 3.3(L)   WBC (external) 4.0 - 11.0 thou/uL - - -   Hgb 11.7 - 15.7 g/dL 9.5(L) 9.2(L) 9.7(L)   Hgb (external) 12.0 - 16.0 g/dL - - -   Plt 150 - 450 10e9/L 181 178 181   Plt (external) 140 - 440 thou/uL - - -     Liver Latest Ref Rng & Units 8/30/2019 8/2/2019 1/15/2019   AP 40 - 150 U/L 167(H) 65 -   AP (external) 34 - 104 U/L - - -   TBili 0.2 - 1.3 mg/dL 0.3 0.8 0.9   ALT 0 - 50 U/L 16 13 -   ALT (external) 7 - 52 U/L - - -   AST 0 - 45 U/L <3 4 -   AST (external) 13 - 39 U/L - - -   Tot Protein 6.8 - 8.8 g/dL 7.2 8.6 7.3   Tot Protein (external) 6.4 - 8.9 g/dL - - -   Albumin 3.4 - 5.0 g/dL 3.9 4.2 3.5   Albumin (external) - - - -     Pancreas Latest Ref Rng & Units 8/3/2019 8/2/2019   A1C 0 - 5.6 % 4.8 4.8     Iron studies Latest Ref Rng & Units 9/3/2019 8/30/2019 8/12/2019   Iron 35 - 180 ug/dL 87 138 96   Iron sat 15 - 46 % 34 51(H) 59(H)   Ferritin 12 - 150 ng/mL 1,535(H) 1,746(H) -   Ferritin (external) 10 - 291 ng/mL - - -     [facility] Txp Virology Latest Ref Rng & Units 9/17/2019 9/3/2019 12/12/2013   BK Spec - Plasma Plasma -   BK Res BKNEG:BK Virus DNA Not Detected copies/mL BK Virus DNA Not Detected BK Virus DNA Not Detected -   BK Log <2.7 Log copies/mL Not Calculated Not Calculated -   Hep B Surf - - - 34.4        Recent Labs   Lab Test 09/26/19  0848 09/30/19  0838 10/03/19  0836   DOSTAC 09/25/19@2030 NTP [id number]   TACROL 9.4 8.4 8.8     Recent Labs   Lab Test 08/16/19  0807 09/03/19  0944   DOSMPA Not Provided 0840pm   MPACID 1.92 3.39   MPAG 149.2* 52.8     Meggan Birch M.D.  Nephrology Fellow  Pager: [phone]

## 2019-10-07 NOTE — NURSING NOTE
Frequency: 40 mcg Every two weeks   Most recent or today's HGB: 9.3  Date: 10/07/19  Date of lat dose: 19  HGB associated with last dose given: 8.6     Blood Pressure:129/83    Diagnosis: Anemia in CKD    Ordered by: Nakul Hill MD  VIS Offered: Yes    Double Checked by: Eugenia Corrigan CMA    See MAR for administration details    Pt's first name, last name and  verified prior to medication administration, injection given without complications or questions.     Danielle Gastelum CMA CMA    10/7/2019 3:17 PM

## 2019-10-07 NOTE — PATIENT INSTRUCTIONS
- Flu vaccination today.  - Aranesp dose today.  - Taper down Fludrocortisone 0.1 mg 3 times a week, Mon, Wed and Frid.   - Return to clinic in 2 months per schedule.

## 2019-10-07 NOTE — PROGRESS NOTES
SUBJECTIVE/OBJECTIVE:                Mona Wagner is a 28 year old female coming in for a follow up MTM visit.  She was referred post txp.    Chief Complaint: 2 months post txp. Complains of Diarrhea a few times a week.     Allergies/ADRs: Reviewed in Epic  Tobacco: No tobacco use   Alcohol: not currently using  Caffeine: no caffeine  PMH: Reviewed in Epic    Medication Adherence/Access:  Patient uses pill box(es) and uses reminders/alarms.  Patient takes medications 2 time(s) per day. 8:30am, 8:30pm  Per patient, misses medication 0 times per week.   Medication barriers: none.     Renal Transplant:  Current immunosuppressants include Tacrolimus 8mg BID, Myfortic 540mg BID.  Pt reports no side effects  Transplant date: 8/3/19  Estimated Creatinine Clearance: 62.4 mL/min (A) (based on SCr of 1.12 mg/dL (H)).  CMV prophylaxis:  Valcyte 900mg once daily Treat 3 months post tx   PCP prophylaxis: Inhaled Pentamidine 300mg q 4 weeks, due to issues with hyperkalemia  Current supplements for electrolyte replacement: magnesium oxide 400mg BID. Had less diarrhea when taking this at night., Sodium Bicarb 650mg tabs, 3 tablets twice daily.   Florinef for hyperkalemia, now normalized, Was reduced by txp team today.    Tx Coordinator: Hannah Polo MD: Dr. Horowitz, Using Med Card: Yes  Skin Care: discussed  Immunizations: annual flu shot UTD; [id number]:  2018; Prevnar 13: unknown; TDaP:  9/2009; Shingrix: unknown    Diarrhea: Pt has off and on diarrhea, up to 4-5x daily about 2-3 times weekly. She is using Metamucil and probiotics only on days she has diarrhea.     Hyperlipidemia: Current therapy includes Atorvastatin 10mg once daily, Aspirin 81mg daily (denies bruising bleeding).  Pt reports no significant myalgias or other side effects.  The ASCVD Risk score ([place] GARY Jr., et al., 2013) failed to calculate for the following reasons:    The 2013 ASCVD risk score is only valid for ages 40 to 79    Birth Control post transplant: Pt is  taking Micronor daily and using a barrier method.     Today's Vitals: There were no vitals taken for this visit.    ASSESSMENT:                 Current medications were reviewed today.    Medication Adherence: good, no issues identified    Renal Transplant:  Discussed Valcyte discontinuation date with patient.     Diarrhea: Instructed patient to use Metamucil and Probiotic every day to help with her diarrhea. If diarrhea worsens after increasing magnesium dose, patient to call me and we will switch her to Magnesium chloride.     Hyperlipidemia: Stable.     Birth Control post transplant: Stable. Pt is using 2 forms of BC while taking Myfortic.     PLAN:                Pt to...  1. Use Metamucil and Probiotics every day to help with diarrhea.   2. Stop Valcyte on 11/3/19 after discussing with txp coordinator.     I spent 20 minutes with this patient today. I offer these suggestions for consideration by txp team. A copy of the visit note was provided to the patient's referring provider.    Will follow up in 2 months.    The patient was given a summary of these recommendations as an after visit summary.    Anthony Hendricks, PharmD  [facility] Pharmacist    Phone: [phone]

## 2019-10-08 ENCOUNTER — TELEPHONE (OUTPATIENT)
Dept: PHARMACY | Facility: CLINIC | Age: 29
End: 2019-10-08

## 2019-10-08 DIAGNOSIS — Z94.0 KIDNEY REPLACED BY TRANSPLANT: Primary | ICD-10-CM

## 2019-10-08 RX ORDER — ATORVASTATIN CALCIUM 10 MG/1
10 TABLET, FILM COATED ORAL DAILY
Qty: 90 TABLET | Refills: 0 | Status: SHIPPED | OUTPATIENT
Start: 2019-10-08 | End: 2020-07-08

## 2019-10-08 NOTE — TELEPHONE ENCOUNTER
Anemia Management Note  SUBJECTIVE/OBJECTIVE:  Referred by Dr. Nakul Hill on 2019  Primary Diagnosis: Anemia in Chronic Kidney Disease (N18.3, D63.1)     Secondary Diagnosis:  Chronic Kidney Disease, Stage 3 (N18.3)   Kidney Tx: 2019  Hgb goal range:  9-10  Epo/Darbo: Aranesp  40 mcg  every two weeks for Hgb <10.  In clinic.   Iron regimen:  NA  Labs : 2020  Recent CHARLINE use, transfusion, IV iron: PO Iron and Aranesp   RX/TX plans :2020  No history of stroke, MI and blood clots or cancers  Contact:            Ok to leave message  per consent to communicate dated 05/15/2019                              OK to speak with Jt Aleman () per consent to communicate dated 05/15/2013    Anemia Latest Ref Rng & Units 2019 2019 2019 2019 2019 10/3/2019 10/7/2019   CHARLINE Dose - - - - - - - 40mcg   Hemoglobin 11.7 - 15.7 g/dL 8.9(L) 9.7(L) 9.5(L) 9.7(L) 9.2(L) 9.5(L) 9.3(L)   TSAT 15 - 46 % - - - - - - -   Ferritin 12 - 150 ng/mL - - - - - - -     BP Readings from Last 3 Encounters:   10/07/19 129/83   19 113/74   19 126/89     Wt Readings from Last 2 Encounters:   10/07/19 51.9 kg (114 lb 8 oz)   19 53.1 kg (117 lb)           ASSESSMENT:  Hgb:At goal - recommend dose  TSat: at goal >30% Ferritin: Elevated (>1000ng/mL)    PLAN:  Dose with aranesp and RTC for hgb then aranesp if needed in 2 week(s)    Orders needed to be renewed (for next follow-up date) in EPIC: None    Iron labs due:  Due    Plan discussed with:  RUTHY Dunn   Plan provided by:  solomon    NEXT FOLLOW-UP DATE:  10/22/2019    Anemia Management Service  Manjula Mckinnon RN  Phone: [phone]  Fax: [phone]

## 2019-10-09 LAB
BKV DNA # SPEC NAA+PROBE: NORMAL COPIES/ML
BKV DNA SPEC NAA+PROBE-LOG#: NORMAL LOG COPIES/ML
SPECIMEN SOURCE: NORMAL

## 2019-10-14 DIAGNOSIS — Z94.0 KIDNEY REPLACED BY TRANSPLANT: Primary | ICD-10-CM

## 2019-10-14 DIAGNOSIS — Z79.899 LONG TERM USE OF DRUG: ICD-10-CM

## 2019-10-14 DIAGNOSIS — Z94.0 KIDNEY REPLACED BY TRANSPLANT: ICD-10-CM

## 2019-10-14 LAB
ANION GAP SERPL CALCULATED.3IONS-SCNC: 9 MMOL/L (ref 3–14)
BUN SERPL-MCNC: 26 MG/DL (ref 7–30)
CALCIUM SERPL-MCNC: 9.8 MG/DL (ref 8.5–10.1)
CHLORIDE SERPL-SCNC: 112 MMOL/L (ref 94–109)
CO2 SERPL-SCNC: 20 MMOL/L (ref 20–32)
CREAT SERPL-MCNC: 1.34 MG/DL (ref 0.52–1.04)
ERYTHROCYTE [DISTWIDTH] IN BLOOD BY AUTOMATED COUNT: 13 % (ref 10–15)
GFR SERPL CREATININE-BSD FRML MDRD: 54 ML/MIN/{1.73_M2}
GLUCOSE SERPL-MCNC: 90 MG/DL (ref 70–99)
HCT VFR BLD AUTO: 31.8 % (ref 35–47)
HGB BLD-MCNC: 10.1 G/DL (ref 11.7–15.7)
MCH RBC QN AUTO: 30.9 PG (ref 26.5–33)
MCHC RBC AUTO-ENTMCNC: 31.8 G/DL (ref 31.5–36.5)
MCV RBC AUTO: 97 FL (ref 78–100)
PLATELET # BLD AUTO: 220 10E9/L (ref 150–450)
POTASSIUM SERPL-SCNC: 4.2 MMOL/L (ref 3.4–5.3)
RBC # BLD AUTO: 3.27 10E12/L (ref 3.8–5.2)
SODIUM SERPL-SCNC: 141 MMOL/L (ref 133–144)
TACROLIMUS BLD-MCNC: 9.9 UG/L (ref 5–15)
TME LAST DOSE: NORMAL H
WBC # BLD AUTO: 4.1 10E9/L (ref 4–11)

## 2019-10-14 PROCEDURE — 80197 ASSAY OF TACROLIMUS: CPT | Performed by: INTERNAL MEDICINE

## 2019-10-14 PROCEDURE — 80048 BASIC METABOLIC PNL TOTAL CA: CPT | Performed by: INTERNAL MEDICINE

## 2019-10-14 PROCEDURE — 85027 COMPLETE CBC AUTOMATED: CPT | Performed by: INTERNAL MEDICINE

## 2019-10-16 ENCOUNTER — TELEPHONE (OUTPATIENT)
Dept: PHARMACY | Facility: CLINIC | Age: 29
End: 2019-10-16

## 2019-10-16 DIAGNOSIS — Z94.0 KIDNEY REPLACED BY TRANSPLANT: Primary | ICD-10-CM

## 2019-10-16 RX ORDER — PENTAMIDINE ISETHIONATE 300 MG/300MG
300 INHALANT RESPIRATORY (INHALATION) ONCE
Status: CANCELLED | OUTPATIENT
Start: 2019-11-07

## 2019-10-16 RX ORDER — ALBUTEROL SULFATE 0.83 MG/ML
2.5 SOLUTION RESPIRATORY (INHALATION) ONCE
Status: COMPLETED | OUTPATIENT
Start: 2019-10-16 | End: 2019-10-16

## 2019-10-16 RX ORDER — PENTAMIDINE ISETHIONATE 300 MG/300MG
300 INHALANT RESPIRATORY (INHALATION) ONCE
Status: COMPLETED | OUTPATIENT
Start: 2019-10-16 | End: 2019-10-16

## 2019-10-16 RX ORDER — ALBUTEROL SULFATE 0.83 MG/ML
2.5 SOLUTION RESPIRATORY (INHALATION) ONCE
Status: CANCELLED | OUTPATIENT
Start: 2019-11-07

## 2019-10-16 RX ADMIN — ALBUTEROL SULFATE 2.5 MG: 0.83 SOLUTION RESPIRATORY (INHALATION) at 11:08

## 2019-10-16 RX ADMIN — PENTAMIDINE ISETHIONATE 300 MG: 300 INHALANT RESPIRATORY (INHALATION) at 11:08

## 2019-10-16 NOTE — PROGRESS NOTES
Patient was seen today for a Pentamidine nebulizer tx ordered by Dr. Ondina Deleon.    Patient was first given 2.5 mg Albuterol nebulizer, after which Pentamidine 300 mg (Lot # [id number]) mixed with 6cc Sterile H2O was administered through a filtered nebulizer.    No adverse side effects noted by the patient.    Pre-Treatment: SpO2 = 100%     HR = 80     BBS = clear, diminished  Post-Treatment: SpO2 = 100%   HR = 96  BBS = clear    Procedure was completed today by IRIS Rachel.

## 2019-10-16 NOTE — TELEPHONE ENCOUNTER
Follow-up with anemia management service:    pt called to find out if she needs aranesp dose this coming Monday.  She does not need dose based on hgb = 10.1    Note: pt requires notification 3-4 days (not including weekends) prior to clinic visits to set up transportation    Anemia Latest Ref Rng & Units 9/19/2019 9/23/2019 9/26/2019 9/30/2019 10/3/2019 10/7/2019 10/14/2019   CHARLINE Dose - - - - - - 40mcg -   Hemoglobin 11.7 - 15.7 g/dL 9.7(L) 9.5(L) 9.7(L) 9.2(L) 9.5(L) 9.3(L) 10.1(L)   TSAT 15 - 46 % - - - - - - -   Ferritin 12 - 150 ng/mL - - - - - - -       Follow-up call date: 10/22/2019    Anemia Management Service  Jericho Morton PharmD, CSP  Phone: [phone]  Fax: [phone]

## 2019-10-21 DIAGNOSIS — Z94.0 HTN, KIDNEY TRANSPLANT RELATED: Primary | ICD-10-CM

## 2019-10-21 DIAGNOSIS — I15.1 HTN, KIDNEY TRANSPLANT RELATED: Primary | ICD-10-CM

## 2019-10-21 DIAGNOSIS — Z79.899 LONG TERM USE OF DRUG: ICD-10-CM

## 2019-10-21 DIAGNOSIS — Z94.0 KIDNEY REPLACED BY TRANSPLANT: ICD-10-CM

## 2019-10-21 LAB
ANION GAP SERPL CALCULATED.3IONS-SCNC: 6 MMOL/L (ref 3–14)
BUN SERPL-MCNC: 24 MG/DL (ref 7–30)
CALCIUM SERPL-MCNC: 9.5 MG/DL (ref 8.5–10.1)
CHLORIDE SERPL-SCNC: 112 MMOL/L (ref 94–109)
CO2 SERPL-SCNC: 24 MMOL/L (ref 20–32)
CREAT SERPL-MCNC: 1.13 MG/DL (ref 0.52–1.04)
ERYTHROCYTE [DISTWIDTH] IN BLOOD BY AUTOMATED COUNT: 12.7 % (ref 10–15)
GFR SERPL CREATININE-BSD FRML MDRD: 67 ML/MIN/{1.73_M2}
GLUCOSE SERPL-MCNC: 77 MG/DL (ref 70–99)
HCT VFR BLD AUTO: 31.2 % (ref 35–47)
HGB BLD-MCNC: 9.7 G/DL (ref 11.7–15.7)
MCH RBC QN AUTO: 29.9 PG (ref 26.5–33)
MCHC RBC AUTO-ENTMCNC: 31.1 G/DL (ref 31.5–36.5)
MCV RBC AUTO: 96 FL (ref 78–100)
PLATELET # BLD AUTO: 207 10E9/L (ref 150–450)
POTASSIUM SERPL-SCNC: 4 MMOL/L (ref 3.4–5.3)
RBC # BLD AUTO: 3.24 10E12/L (ref 3.8–5.2)
SODIUM SERPL-SCNC: 142 MMOL/L (ref 133–144)
WBC # BLD AUTO: 3.2 10E9/L (ref 4–11)

## 2019-10-21 PROCEDURE — 80197 ASSAY OF TACROLIMUS: CPT | Performed by: INTERNAL MEDICINE

## 2019-10-21 PROCEDURE — 80048 BASIC METABOLIC PNL TOTAL CA: CPT | Performed by: INTERNAL MEDICINE

## 2019-10-21 PROCEDURE — 85027 COMPLETE CBC AUTOMATED: CPT | Performed by: INTERNAL MEDICINE

## 2019-10-22 ENCOUNTER — TELEPHONE (OUTPATIENT)
Dept: PHARMACY | Facility: CLINIC | Age: 29
End: 2019-10-22

## 2019-10-22 ENCOUNTER — TELEPHONE (OUTPATIENT)
Dept: TRANSPLANT | Facility: CLINIC | Age: 29
End: 2019-10-22

## 2019-10-22 DIAGNOSIS — Z94.0 KIDNEY REPLACED BY TRANSPLANT: Primary | ICD-10-CM

## 2019-10-22 LAB
TACROLIMUS BLD-MCNC: 9.1 UG/L (ref 5–15)
TME LAST DOSE: NORMAL H

## 2019-10-22 NOTE — TELEPHONE ENCOUNTER
Follow-up with anemia management service:    Spoke with Mona, she cesilia like to hold off on Aranesp this week. Stating that she had her period twice this month and is hoping her Hgb will increase next week.       Anemia Latest Ref Rng & Units 9/23/2019 9/26/2019 9/30/2019 10/3/2019 10/7/2019 10/14/2019 10/21/2019   CHARLINE Dose - - - - - 40mcg - -   Hemoglobin 11.7 - 15.7 g/dL 9.5(L) 9.7(L) 9.2(L) 9.5(L) 9.3(L) 10.1(L) 9.7(L)   TSAT 15 - 46 % - - - - - - -   Ferritin 12 - 150 ng/mL - - - - - - -           Follow-up call date: 10/29/2019      Anemia Management Service  Manjula Mckinnon RN  Phone: [phone]  Fax: [phone]

## 2019-10-22 NOTE — TELEPHONE ENCOUNTER
Mona reports dry cough and dry throat, stuffy/ runny nose.  Denies any fevers.    Okay for robitussin and coricidin HBP, benadryl at night to sleep.    Mona voiced understanding and will notify [facility] if fever or productive cough becomes present.

## 2019-10-22 NOTE — TELEPHONE ENCOUNTER
Patient Call: Medication Clarification    Please connect with the pt to advise what over the counter medications she can take for relieving cold like symptoms- the pt states she has been feeling ill for about 1 week.     Pt denies having a fever- she has a cough, runny/stuffy nose w/ head congestion    Call back needed? Yes    Return Call Needed  Same as documented in contacts section  When to return call?: Greater than one day: Route standard priority

## 2019-10-28 ENCOUNTER — TELEPHONE (OUTPATIENT)
Dept: TRANSPLANT | Facility: CLINIC | Age: 29
End: 2019-10-28

## 2019-10-28 DIAGNOSIS — Z94.0 KIDNEY REPLACED BY TRANSPLANT: ICD-10-CM

## 2019-10-28 DIAGNOSIS — Z94.0 HTN, KIDNEY TRANSPLANT RELATED: Primary | ICD-10-CM

## 2019-10-28 DIAGNOSIS — I15.1 HTN, KIDNEY TRANSPLANT RELATED: Primary | ICD-10-CM

## 2019-10-28 DIAGNOSIS — Z94.0 KIDNEY REPLACED BY TRANSPLANT: Primary | ICD-10-CM

## 2019-10-28 DIAGNOSIS — Z79.899 LONG TERM USE OF DRUG: ICD-10-CM

## 2019-10-28 LAB
ANION GAP SERPL CALCULATED.3IONS-SCNC: 5 MMOL/L (ref 3–14)
BUN SERPL-MCNC: 34 MG/DL (ref 7–30)
CALCIUM SERPL-MCNC: 9.7 MG/DL (ref 8.5–10.1)
CHLORIDE SERPL-SCNC: 111 MMOL/L (ref 94–109)
CO2 SERPL-SCNC: 24 MMOL/L (ref 20–32)
CREAT SERPL-MCNC: 1.3 MG/DL (ref 0.52–1.04)
ERYTHROCYTE [DISTWIDTH] IN BLOOD BY AUTOMATED COUNT: 12.4 % (ref 10–15)
GFR SERPL CREATININE-BSD FRML MDRD: 56 ML/MIN/{1.73_M2}
GLUCOSE SERPL-MCNC: 90 MG/DL (ref 70–99)
HCT VFR BLD AUTO: 33.1 % (ref 35–47)
HGB BLD-MCNC: 10.5 G/DL (ref 11.7–15.7)
MAGNESIUM SERPL-MCNC: 1.7 MG/DL (ref 1.6–2.3)
MCH RBC QN AUTO: 30.3 PG (ref 26.5–33)
MCHC RBC AUTO-ENTMCNC: 31.7 G/DL (ref 31.5–36.5)
MCV RBC AUTO: 96 FL (ref 78–100)
PHOSPHATE SERPL-MCNC: 2.4 MG/DL (ref 2.5–4.5)
PLATELET # BLD AUTO: 228 10E9/L (ref 150–450)
POTASSIUM SERPL-SCNC: 4.4 MMOL/L (ref 3.4–5.3)
RBC # BLD AUTO: 3.46 10E12/L (ref 3.8–5.2)
SODIUM SERPL-SCNC: 140 MMOL/L (ref 133–144)
TACROLIMUS BLD-MCNC: 9.4 UG/L (ref 5–15)
TME LAST DOSE: NORMAL H
WBC # BLD AUTO: 3.7 10E9/L (ref 4–11)

## 2019-10-28 PROCEDURE — 84100 ASSAY OF PHOSPHORUS: CPT | Performed by: INTERNAL MEDICINE

## 2019-10-28 PROCEDURE — 83735 ASSAY OF MAGNESIUM: CPT | Performed by: INTERNAL MEDICINE

## 2019-10-28 PROCEDURE — 85027 COMPLETE CBC AUTOMATED: CPT | Performed by: INTERNAL MEDICINE

## 2019-10-28 PROCEDURE — 80048 BASIC METABOLIC PNL TOTAL CA: CPT | Performed by: INTERNAL MEDICINE

## 2019-10-28 PROCEDURE — 80197 ASSAY OF TACROLIMUS: CPT | Performed by: INTERNAL MEDICINE

## 2019-10-28 NOTE — TELEPHONE ENCOUNTER
Creatinine and hgb up.   Appears dry on labs.    Mona reports she is drinking 2-2.5 liters daily instead of 3L.  She is back to work now, Tue / Th.  Denies N/V.  She does have intermittent diarrhea     / 80  [facility]    Weight stable, but decreases from PM to AM with nocturnal diuresis.  Denies feve.r  Some mild pain over graft when exercising, ongoing bloating.      Denies any s/s of UTI.     Repeat labs with good oral hydration.

## 2019-10-29 ENCOUNTER — TELEPHONE (OUTPATIENT)
Dept: PHARMACY | Facility: CLINIC | Age: 29
End: 2019-10-29

## 2019-10-29 NOTE — TELEPHONE ENCOUNTER
Anemia Management Note  SUBJECTIVE/OBJECTIVE:  Referred by Dr. Nakul Hill on 2019  Primary Diagnosis: Anemia in Chronic Kidney Disease (N18.3, D63.1)     Secondary Diagnosis:  Chronic Kidney Disease, Stage 3 (N18.3)   Kidney Tx: 2019  Hgb goal range:  9-10  Epo/Darbo: Aranesp  40 mcg  every two weeks for Hgb <10.  In clinic.   Iron regimen:  NA  Labs : 2020  Recent CHARLINE use, transfusion, IV iron: PO Iron and Aranesp   RX/TX plans :2020  No history of stroke, MI and blood clots or cancers  Contact:            Ok to leave message  per consent to communicate dated 05/15/2019                              OK to speak with Jt Aleman () per consent to communicate dated 05/15/2013    Anemia Latest Ref Rng & Units 2019 2019 10/3/2019 10/7/2019 10/14/2019 10/21/2019 10/28/2019   CHARLINE Dose - - - - 40mcg - - -   Hemoglobin 11.7 - 15.7 g/dL 9.7(L) 9.2(L) 9.5(L) 9.3(L) 10.1(L) 9.7(L) 10.5(L)   TSAT 15 - 46 % - - - - - - -   Ferritin 12 - 150 ng/mL - - - - - - -     BP Readings from Last 3 Encounters:   10/07/19 129/83   19 113/74   19 126/89     Wt Readings from Last 2 Encounters:   10/07/19 51.9 kg (114 lb 8 oz)   19 53.1 kg (117 lb)           ASSESSMENT:  Hgb:Above goal - recommend hold dose  TSat: at goal >30% Ferritin: Elevated (>1000ng/mL)    PLAN:  Hold Aranesp and RTC for hgb then aranesp if needed in 2 week(s)    Orders needed to be renewed (for next follow-up date) in LETTERS - for external labs: None    Iron labs due:  Due. [facility] is aware that Iron labs are due    Plan discussed with:  RUTHY Dunn  Plan provided by:  solomon    NEXT FOLLOW-UP DATE:  2019    Anemia Management Service  Manjulatamra Mckinnon RN  Phone: [phone]  Fax: [phone]

## 2019-10-30 ENCOUNTER — TELEPHONE (OUTPATIENT)
Dept: TRANSPLANT | Facility: CLINIC | Age: 29
End: 2019-10-30

## 2019-10-30 NOTE — TELEPHONE ENCOUNTER
Post Kidney and Pancreas Transplant Team Conference  Date: 10/30/2019  Transplant Coordinator: Laurence Vazquez     Attendees:  []  Dr. Hill  [x] Leonela Brewster LPN     []  Dr. Wilson [] Cinthia Cox RN [] Kiersten Chang LPN   [x]  Dr. Rios [] Gely Lynn, RN     [] Keeley Li RN [x] Anthony Hendricks, RhinaD   [] Dr. Johnson [x] Melani Vazquez RN    [] Dr. Durbin [] Virgilio Ryan RN    [x] Dr. Pinto [] Maria Teresa Mayberry RN    [] Dr. Delgadillo [] Yesika Wong RN     [] Melanie Ayala RN    [] Surgery Fellow [x] Nancy Maldonado RN    [] Johana Linda, NP [] Nathalia Moreno RN        Verbal Plan Read Back:   No changes at this time    Routed to RN Coordinator   Leonela Brewster LPN

## 2019-10-31 DIAGNOSIS — Z94.0 KIDNEY REPLACED BY TRANSPLANT: ICD-10-CM

## 2019-10-31 DIAGNOSIS — Z94.0 KIDNEY REPLACED BY TRANSPLANT: Primary | ICD-10-CM

## 2019-10-31 LAB
ANION GAP SERPL CALCULATED.3IONS-SCNC: 7 MMOL/L (ref 3–14)
BUN SERPL-MCNC: 27 MG/DL (ref 7–30)
CALCIUM SERPL-MCNC: 9.5 MG/DL (ref 8.5–10.1)
CHLORIDE SERPL-SCNC: 109 MMOL/L (ref 94–109)
CO2 SERPL-SCNC: 23 MMOL/L (ref 20–32)
CREAT SERPL-MCNC: 1.25 MG/DL (ref 0.52–1.04)
ERYTHROCYTE [DISTWIDTH] IN BLOOD BY AUTOMATED COUNT: 12.5 % (ref 10–15)
GFR SERPL CREATININE-BSD FRML MDRD: 59 ML/MIN/{1.73_M2}
GLUCOSE SERPL-MCNC: 92 MG/DL (ref 70–99)
HCT VFR BLD AUTO: 32.4 % (ref 35–47)
HGB BLD-MCNC: 10.1 G/DL (ref 11.7–15.7)
MCH RBC QN AUTO: 30.1 PG (ref 26.5–33)
MCHC RBC AUTO-ENTMCNC: 31.2 G/DL (ref 31.5–36.5)
MCV RBC AUTO: 96 FL (ref 78–100)
PLATELET # BLD AUTO: 224 10E9/L (ref 150–450)
POTASSIUM SERPL-SCNC: 4.4 MMOL/L (ref 3.4–5.3)
RBC # BLD AUTO: 3.36 10E12/L (ref 3.8–5.2)
SODIUM SERPL-SCNC: 138 MMOL/L (ref 133–144)
TACROLIMUS BLD-MCNC: 8.5 UG/L (ref 5–15)
TME LAST DOSE: NORMAL H
WBC # BLD AUTO: 3.4 10E9/L (ref 4–11)

## 2019-10-31 PROCEDURE — 85027 COMPLETE CBC AUTOMATED: CPT | Performed by: INTERNAL MEDICINE

## 2019-10-31 PROCEDURE — 80048 BASIC METABOLIC PNL TOTAL CA: CPT | Performed by: INTERNAL MEDICINE

## 2019-10-31 PROCEDURE — 80197 ASSAY OF TACROLIMUS: CPT | Performed by: INTERNAL MEDICINE

## 2019-11-01 ENCOUNTER — TELEPHONE (OUTPATIENT)
Dept: TRANSPLANT | Facility: CLINIC | Age: 29
End: 2019-11-01

## 2019-11-01 ENCOUNTER — ANCILLARY PROCEDURE (OUTPATIENT)
Dept: ULTRASOUND IMAGING | Facility: CLINIC | Age: 29
End: 2019-11-01
Attending: NURSE PRACTITIONER
Payer: MEDICARE

## 2019-11-01 DIAGNOSIS — Z94.0 KIDNEY REPLACED BY TRANSPLANT: ICD-10-CM

## 2019-11-01 DIAGNOSIS — R79.89 ELEVATED SERUM CREATININE: ICD-10-CM

## 2019-11-01 DIAGNOSIS — Z94.0 KIDNEY REPLACED BY TRANSPLANT: Primary | ICD-10-CM

## 2019-11-01 LAB
ALBUMIN UR-MCNC: NEGATIVE MG/DL
APPEARANCE UR: CLEAR
BILIRUB UR QL STRIP: NEGATIVE
COLOR UR AUTO: YELLOW
GLUCOSE UR STRIP-MCNC: NEGATIVE MG/DL
HGB UR QL STRIP: ABNORMAL
KETONES UR STRIP-MCNC: NEGATIVE MG/DL
LEUKOCYTE ESTERASE UR QL STRIP: NEGATIVE
NITRATE UR QL: NEGATIVE
PH UR STRIP: 8 PH (ref 5–7)
RBC #/AREA URNS AUTO: <1 /HPF (ref 0–2)
SOURCE: ABNORMAL
SP GR UR STRIP: 1.01 (ref 1–1.03)
SQUAMOUS #/AREA URNS AUTO: 1 /HPF (ref 0–1)
UROBILINOGEN UR STRIP-MCNC: 0 MG/DL (ref 0–2)
WBC #/AREA URNS AUTO: <1 /HPF (ref 0–5)

## 2019-11-01 NOTE — TELEPHONE ENCOUNTER
For increasing pain over incisions site and rise in creatinine, as discussed with Johana Linda, NP -   Obtain UA / UC and US.   Plan for clinic with Johana on 11/4.    Lab and imaging orders entered.  Will leave urine sample at 2:45, US at 3:15 at [facility].   to call for appt time with Johana on Monday.  Mona voiced understanding.    Mona also voiced understanding- okay for tylenol for pain control, but do not take around the clock, as if she develops fever over the weekend >100.5, will need to present to [facility] ED.

## 2019-11-04 ENCOUNTER — TELEPHONE (OUTPATIENT)
Dept: TRANSPLANT | Facility: CLINIC | Age: 29
End: 2019-11-04

## 2019-11-04 ENCOUNTER — OFFICE VISIT (OUTPATIENT)
Dept: TRANSPLANT | Facility: CLINIC | Age: 29
End: 2019-11-04
Attending: NURSE PRACTITIONER
Payer: MEDICARE

## 2019-11-04 VITALS
HEART RATE: 87 BPM | BODY MASS INDEX: 24.44 KG/M2 | OXYGEN SATURATION: 99 % | WEIGHT: 124.5 LBS | DIASTOLIC BLOOD PRESSURE: 87 MMHG | SYSTOLIC BLOOD PRESSURE: 132 MMHG | HEIGHT: 60 IN

## 2019-11-04 DIAGNOSIS — Z94.0 KIDNEY TRANSPLANTED: ICD-10-CM

## 2019-11-04 DIAGNOSIS — R79.89 ELEVATED SERUM CREATININE: Primary | ICD-10-CM

## 2019-11-04 DIAGNOSIS — Z79.899 LONG TERM USE OF DRUG: ICD-10-CM

## 2019-11-04 DIAGNOSIS — R79.89 ELEVATED SERUM CREATININE: ICD-10-CM

## 2019-11-04 DIAGNOSIS — Z94.0 KIDNEY REPLACED BY TRANSPLANT: Primary | ICD-10-CM

## 2019-11-04 DIAGNOSIS — I15.1 HTN, KIDNEY TRANSPLANT RELATED: Primary | ICD-10-CM

## 2019-11-04 DIAGNOSIS — Z94.0 KIDNEY REPLACED BY TRANSPLANT: ICD-10-CM

## 2019-11-04 DIAGNOSIS — Z94.0 HTN, KIDNEY TRANSPLANT RELATED: Primary | ICD-10-CM

## 2019-11-04 LAB
ANION GAP SERPL CALCULATED.3IONS-SCNC: 6 MMOL/L (ref 3–14)
BUN SERPL-MCNC: 33 MG/DL (ref 7–30)
CALCIUM SERPL-MCNC: 9.8 MG/DL (ref 8.5–10.1)
CHLORIDE SERPL-SCNC: 110 MMOL/L (ref 94–109)
CO2 SERPL-SCNC: 22 MMOL/L (ref 20–32)
CREAT SERPL-MCNC: 1.33 MG/DL (ref 0.52–1.04)
ERYTHROCYTE [DISTWIDTH] IN BLOOD BY AUTOMATED COUNT: 12.1 % (ref 10–15)
GFR SERPL CREATININE-BSD FRML MDRD: 55 ML/MIN/{1.73_M2}
GLUCOSE SERPL-MCNC: 92 MG/DL (ref 70–99)
HCT VFR BLD AUTO: 34.4 % (ref 35–47)
HGB BLD-MCNC: 10.8 G/DL (ref 11.7–15.7)
MAGNESIUM SERPL-MCNC: 1.7 MG/DL (ref 1.6–2.3)
MCH RBC QN AUTO: 30.3 PG (ref 26.5–33)
MCHC RBC AUTO-ENTMCNC: 31.4 G/DL (ref 31.5–36.5)
MCV RBC AUTO: 96 FL (ref 78–100)
PHOSPHATE SERPL-MCNC: 2.3 MG/DL (ref 2.5–4.5)
PLATELET # BLD AUTO: 225 10E9/L (ref 150–450)
POTASSIUM SERPL-SCNC: 4.7 MMOL/L (ref 3.4–5.3)
RBC # BLD AUTO: 3.57 10E12/L (ref 3.8–5.2)
SODIUM SERPL-SCNC: 138 MMOL/L (ref 133–144)
TACROLIMUS BLD-MCNC: 10.7 UG/L (ref 5–15)
TME LAST DOSE: NORMAL H
WBC # BLD AUTO: 4 10E9/L (ref 4–11)

## 2019-11-04 PROCEDURE — G0463 HOSPITAL OUTPT CLINIC VISIT: HCPCS | Mod: ZF

## 2019-11-04 PROCEDURE — 80048 BASIC METABOLIC PNL TOTAL CA: CPT | Performed by: INTERNAL MEDICINE

## 2019-11-04 PROCEDURE — 87799 DETECT AGENT NOS DNA QUANT: CPT | Performed by: INTERNAL MEDICINE

## 2019-11-04 PROCEDURE — 80197 ASSAY OF TACROLIMUS: CPT | Performed by: INTERNAL MEDICINE

## 2019-11-04 PROCEDURE — 83735 ASSAY OF MAGNESIUM: CPT | Performed by: INTERNAL MEDICINE

## 2019-11-04 PROCEDURE — 84100 ASSAY OF PHOSPHORUS: CPT | Performed by: INTERNAL MEDICINE

## 2019-11-04 PROCEDURE — 85027 COMPLETE CBC AUTOMATED: CPT | Performed by: INTERNAL MEDICINE

## 2019-11-04 ASSESSMENT — MIFFLIN-ST. JEOR: SCORE: 1226.23

## 2019-11-04 NOTE — PROGRESS NOTES
Chief Complaint   Patient presents with     RECHECK     Discomfort in incision area for about 1 week     Blood pressure 132/87, pulse 87, height 1.524 m (5'), weight 56.5 kg (124 lb 8 oz), SpO2 99 %, not currently breastfeeding.    Pennie Augustine, CMA

## 2019-11-04 NOTE — LETTER
2019      RE: Mona Wagner  [address]  [facility] [address]       Chief Complaint   Patient presents with     RECHECK     Discomfort in incision area for about 1 week     Blood pressure 132/87, pulse 87, height 1.524 m (5'), weight 56.5 kg (124 lb 8 oz), SpO2 99 %, not currently breastfeeding.    [facility]      Transplant Surgery Progress Note    Transplants:  8/3/2019 (Kidney); Postoperative day:  94  S: oMna Wagner is an 28 year old female with history of ESKD secondary to IgA nephropathy, SAH  while on warfarin complicated by seizure, hypothyroidism, chronic ITP, and hyperprolactinemia secondary to pituitary adenoma. Underwent a DCD  donor kidney transplant with three arteries and ureteral stent on 8/3/19.    Patient presents today with graft pain.    She states that the pain began a few days ago and is intermittent however at times it is semi-severe.    She denies any fevers, chills, nausea, or vomiting.    She denies any hematuria, dysuria, or frequency.  She says overall she feels well however she is concerned about her pain.    She also is concerned that she has an increase in her creatinine.      Transplant History:    Transplant Type:  DDKT  Donor Type: Donation after Circulatory Death    Transplant Date:  8/3/2019 (Kidney)   Ureteral Stent:  No   Crossmatch:  negative   DSA at Tx:  No  Baseline Cr: 1.33  DeNovo DSA: No    Acute Rejection Hx:  No    Present Maintenance Immunosuppression:  Tacrolimus and Mycophenolate mofetil    CMV IgG Ab Discordance:  No  EBV IgG Ab Discordance:  No    BK Viremia:  No  EBV Viremia:  No    Transplant Coordinator: Laurence Vazquez     Transplant Office Phone Number: [phone]     Immunosuppressant Medications     Immunosuppressive Agents Disp Start End     MYFORTIC (BRAND) 180 MG EC tablet    180 tablet 2019     Sig - Route: Take 3 tablets (540 mg) by mouth 2 times daily - Oral    Class: E-Prescribe     tacrolimus (GENERIC  EQUIVALENT) 0.5 MG capsule    30 capsule 9/20/2019     Sig: HOLD for dose change    Class: Historical     tacrolimus (GENERIC EQUIVALENT) 1 MG capsule    180 capsule 9/20/2019     Sig - Route: Take 3 capsules (3 mg) by mouth 2 times daily Total dose = 8mg - Oral    Class: Historical     tacrolimus (GENERIC EQUIVALENT) 5 MG capsule    120 capsule 9/20/2019     Sig - Route: Take 1 capsule (5 mg) by mouth 2 times daily Total dose = 8mg - Oral    Class: Historical          Possible Immunosuppression-related side effects:   []             headache  []             vivid dreams  []             irritability  []             cognitive difficuties  []             fine tremor  []             nausea  []             diarrhea  []             neuropathy      []             edema  []             renal calcineurin toxicity  []             hyperkalemia  []             post-transplant diabetes  []             decreased appetite  []             increased appetite  []             other:  []             none    Prescription Medications as of 11/5/2019       Rx Number Disp Refills Start End Last Dispensed Date Next Fill Date Owning Pharmacy    acetaminophen (TYLENOL) 325 MG tablet  100 tablet 3 8/2/2019    [id number] [place] [place] - [address] [facility]    Sig: Take 2 tablets (650 mg) by mouth every 4 hours as needed for mild pain    Class: E-Prescribe    Route: Oral    aspirin (ASA) 81 MG chewable tablet  30 tablet 5 8/13/2019    [place], [place] - [address] [place] [address]    Sig: Take 1 tablet (81 mg) by mouth daily    Class: E-Prescribe    Route: Oral    atorvastatin (LIPITOR) 10 MG tablet  90 tablet 0 10/8/2019    [id number] [place] [place] - [address] [facility] [address] [facility]    Sig: Take 1 tablet (10 mg) by mouth daily    Class: E-Prescribe    Route: Oral    cinacalcet (SENSIPAR) 30 MG tablet  30 tablet 2 8/22/2019    [facility]  #[id number] - [place], [place] - [address] [facility]    Sig: Take 1 tablet (30 mg) by mouth daily    Class: E-Prescribe    Route: Oral    diphenhydrAMINE (BENADRYL) 25 MG tablet  60 tablet 1 1/17/2018    [id number] - [place], [place] - [address] [facility]    Sig: Take 1-2 tablets (25-50 mg) by mouth every 6 hours as needed for itching or allergies    Class: E-Prescribe    Route: Oral    fludrocortisone (FLORINEF) 0.1 MG tablet  180 tablet 3 9/3/2019    [facility] #[id number] - [place], [place] - [address] [facility]    Sig: Take 0.1 mg by mouth three times a week     Class: Historical    Route: Oral    hydrOXYzine (ATARAX) 10 MG tablet  20 tablet 0 8/12/2019    [place], [place] - [address] [place] [address]    Sig: Take 1 tablet (10 mg) by mouth 3 times daily as needed for anxiety    Class: E-Prescribe    Route: Oral    lactobacillus rhamnosus, GG, (CULTURELL) capsule    8/27/2019        Sig: Take 1 capsule by mouth 2 times daily    Class: Historical    Route: Oral    levothyroxine (SYNTHROID/LEVOTHROID) 25 MCG tablet  105 tablet 1 5/20/2019    [id number] - [place], [place] - [address] [facility]    Sig: Take 1 tablet (25 mcg) Tuesday, Thursday, Saturday and Sunday and 1.5 tablets (37.5 mcg) on Monday, Wednesday and Friday every week.    Class: E-Prescribe    Notes to Pharmacy: PHARMACY- DISPENSE 3 MONTH SUPPLY    magnesium oxide (MAG-OX) 400 MG tablet  180 tablet 0 10/7/2019    [facility][id number] - [place], [place] - [address] [facility]    Sig: Take 1 tablet (400 mg) by mouth 2 times daily    Class: E-Prescribe    Route: Oral    multivitamin RENAL (NEPHROCAPS/TRIPHROCAPS) 1 MG capsule  30 capsule 2 9/12/2019    [id number] - [place], [place] - [address] [facility] [address]    Sig: Take 1 capsule by mouth daily    Class: E-Prescribe     Route: Oral    MYFORTIC (BRAND) 180 MG EC tablet  180 tablet 11 8/19/2019    [facility] [place], [place] - [address] [facility] [address] [facility] [address] [facility] [id number]    Sig: Take 3 tablets (540 mg) by mouth 2 times daily    Class: E-Prescribe    Route: Oral    norethindrone (MICRONOR) 0.35 MG tablet    8/12/2019        Sig: Do not restart until your follow up appointment with your surgeon. Discuss restart date at that appointment.    Class: No Print Out    ondansetron (ZOFRAN ODT) 4 MG ODT tab  60 tablet 1 4/12/2017    [facility] #[id number] - [place], [place] - [address]    Sig: Take 1-2 tablets (4-8 mg) by mouth every 8 hours as needed for nausea    Class: E-Prescribe    Route: Oral    pentamidine (NEBUPENT) 300 MG neb solution    8/12/2019    [facility] [place], [place] - [address] [facility]    Sig: Inhale 300 mg into the lungs every 28 days    Class: No Print Out    Route: Inhalation    psyllium (METAMUCIL/KONSYL) capsule  90 capsule 3 8/28/2019 8/27/2020   [facility][id number] - [place], [place] - [address] [facility]    Sig: Take 1 capsule by mouth daily    Class: E-Prescribe    Notes to Pharmacy: ND covered: [id number], [id number], [id number], [id number]    Route: Oral    simethicone (MYLICON) 125 MG chewable tablet    1/29/2019    [facility] #[id number] - [place], [place] - [address] [facility]    Sig: Take 1 tablet (125 mg) by mouth 4 times daily as needed for intestinal gas    Class: Historical    Route: Oral    sodium bicarbonate 650 MG tablet  180 tablet 2 9/30/2019    [facility][id number] - [place], [place] - [address] [facility]    Sig: Take 3 tablets (1,950 mg) by mouth 2 times daily    Class: E-Prescribe    Route: Oral    tacrolimus (GENERIC EQUIVALENT) 0.5 MG capsule  30 capsule 0 9/20/2019        Sig: HOLD for dose change    Class: Historical    tacrolimus (GENERIC EQUIVALENT) 1 MG capsule  180 capsule 11  9/20/2019        Sig: Take 3 capsules (3 mg) by mouth 2 times daily Total dose = 8mg    Class: Historical    Route: Oral    tacrolimus (GENERIC EQUIVALENT) 5 MG capsule  120 capsule 11 9/20/2019        Sig: Take 1 capsule (5 mg) by mouth 2 times daily Total dose = 8mg    Class: Historical    Route: Oral    valGANciclovir (VALCYTE) 450 MG tablet  60 tablet 1 10/4/2019    [facility] #[id number] - [place], [place] - [address] [facility] [address]    Sig: Take 2 tablets (900 mg) by mouth daily    Class: E-Prescribe    Route: Oral          O:      General Appearance: in no apparent distress.   Skin: Normal, no rashes or jaundice  Heart: regular rate and rhythm, normal S1 and S2  Lungs: easy respirations, no audible wheezing.  Abdomen: flat, The wound is dry and intact, without hernia. The abdomen is mildly tender. The kidney graft is tender.  There is no ascites.  Extremities: Tremor absent.   Edema: absent.     Transplant Immunosuppression Labs Latest Ref Rng & Units 11/4/2019 10/31/2019 10/28/2019 10/21/2019 10/14/2019   Tacro Level 5.0 - 15.0 ug/L 10.7 8.5 9.4 9.1 9.9   Tacro Level - 11/3 2030 Not Provided 10/27 2030 10/20 2030 10/13 2030   Creat 0.52 - 1.04 mg/dL 1.33(H) 1.25(H) 1.30(H) 1.13(H) 1.34(H)   BUN 7 - 30 mg/dL 33(H) 27 34(H) 24 26   WBC 4.0 - 11.0 10e9/L 4.0 3.4(L) 3.7(L) 3.2(L) 4.1   Neutrophil % - - - - -   ANEU 1.6 - 8.3 10e9/L - - - - -       Chemistries:   Recent Labs   Lab Test 11/04/19  0850   BUN 33*   CR 1.33*   GFRESTIMATED 55*   GLC 92     Lab Results   Component Value Date    A1C 4.8 08/03/2019     Recent Labs   Lab Test 08/30/19  2210   ALBUMIN 3.9   BILITOTAL 0.3   ALKPHOS 167*   AST <3   ALT 16     Urine Studies:  Recent Labs   Lab Test 11/01/19  1148   COLOR Yellow   APPEARANCE Clear   URINEGLC Negative   URINEBILI Negative   URINEKETONE Negative   SG 1.010   UBLD Large*   URINEPH 8.0*   PROTEIN Negative   NITRITE Negative   LEUKEST Negative   RBCU <1   WBCU <1     Recent Labs    Lab Test 09/03/19  0940 12/11/13  1915   UTPG 0.24* 7.42*     Hematology:   Recent Labs   Lab Test 11/04/19  0850 10/31/19  0830 10/28/19  0840   HGB 10.8* 10.1* 10.5*    224 228   WBC 4.0 3.4* 3.7*     Coags:   Recent Labs   Lab Test 08/30/19  2210 08/02/19  1938   INR 0.95 0.94     HLA antibodies:   SA1 Hi Risk Noemy   Date Value Ref Range Status   09/12/2019 Cw:17  Final     SA1 Mod Risk Noemy   Date Value Ref Range Status   09/12/2019 None  Final     SA2 Hi Risk Noemy   Date Value Ref Range Status   09/12/2019 None  Final     SA2 Mod Risk Noemy   Date Value Ref Range Status   09/12/2019 DR:7 DP:10 14  Final       Assessment: Mona Wagner is doing fairly well s/p DDKT:  Issues we addressed during her visit include:    Plan:    1. Graft function: elevated.  US completed.  Lasix renogram ordered  2. Immunosuppression Management: No change continue cellcept and prograf' .  Complexity of management:Medium.  Contributing factors: anemia  3. Elevated cr:  US completed.  Lasix renogram ordered  4. Abdominal pain:  Heating pad to abdomen  Followup: as directed    Total Time: 25 min,   Counselling Time: 15 min.          Johana Linda NP

## 2019-11-05 ENCOUNTER — TELEPHONE (OUTPATIENT)
Dept: TRANSPLANT | Facility: CLINIC | Age: 29
End: 2019-11-05

## 2019-11-05 DIAGNOSIS — Z94.0 KIDNEY REPLACED BY TRANSPLANT: Primary | ICD-10-CM

## 2019-11-05 RX ORDER — TACROLIMUS 1 MG/1
7 CAPSULE ORAL 2 TIMES DAILY
Qty: 420 CAPSULE | Refills: 11 | Status: SHIPPED | OUTPATIENT
Start: 2019-11-05 | End: 2019-11-12

## 2019-11-05 RX ORDER — TACROLIMUS 0.5 MG/1
0.5 CAPSULE ORAL 2 TIMES DAILY
Qty: 60 CAPSULE | Refills: 11 | Status: SHIPPED | OUTPATIENT
Start: 2019-11-05 | End: 2019-11-12

## 2019-11-05 NOTE — TELEPHONE ENCOUNTER
----- Message from Laurence Vazquez RN sent at 11/4/2019  3:51 PM CST -----  Regarding: URGENT, PLEASE SCHEDULE BY 11/5/19  Appointment Request: NM Renogram  Orders Placed: Yes   Patient Aware? Yes.  Physician Override Approved? No  Appointment Timeframe Requested: ASAP    Thank you,   Laurence Vazquez RN

## 2019-11-05 NOTE — TELEPHONE ENCOUNTER
ISSUE:   Tacrolimus level 10.7 on 11/4, goal 8-10, dose 8 mg BID.    PLAN:   Please call patient and confirm this was a good 12-hour trough. Verify dose 8 mg BID. Confirm no new medications or illness. Confirm no missed doses. If good trough, decrease tacrolimus dose to 7.5 mg BID and repeat labs as scheduled.    OUTCOME:   Spoke with patient, they confirm accurate trough level and current dose 8 mg BID. Patient confirmed dose change to 7.5 mg BID and to repeat labs in 1 weeks. Orders sent to preferred pharmacy for dose change and lab for repeat labs. Patient voiced understanding of plan.

## 2019-11-05 NOTE — PROGRESS NOTES
Transplant Surgery Progress Note    Transplants:  8/3/2019 (Kidney); Postoperative day:  94  S: Mona Wagner is an 28 year old female with history of ESKD secondary to IgA nephropathy, SAH  while on warfarin complicated by seizure, hypothyroidism, chronic ITP, and hyperprolactinemia secondary to pituitary adenoma. Underwent a DCD  donor kidney transplant with three arteries and ureteral stent on 8/3/19.    Patient presents today with graft pain.    She states that the pain began a few days ago and is intermittent however at times it is semi-severe.    She denies any fevers, chills, nausea, or vomiting.    She denies any hematuria, dysuria, or frequency.  She says overall she feels well however she is concerned about her pain.    She also is concerned that she has an increase in her creatinine.      Transplant History:    Transplant Type:  DDKT  Donor Type: Donation after Circulatory Death    Transplant Date:  8/3/2019 (Kidney)   Ureteral Stent:  No   Crossmatch:  negative   DSA at Tx:  No  Baseline Cr: 1.33  DeNovo DSA: No    Acute Rejection Hx:  No    Present Maintenance Immunosuppression:  Tacrolimus and Mycophenolate mofetil    CMV IgG Ab Discordance:  No  EBV IgG Ab Discordance:  No    BK Viremia:  No  EBV Viremia:  No    Transplant Coordinator: Laurence Vazquez     Transplant Office Phone Number: [phone]     Immunosuppressant Medications     Immunosuppressive Agents Disp Start End     MYFORTIC (BRAND) 180 MG EC tablet    180 tablet 2019     Sig - Route: Take 3 tablets (540 mg) by mouth 2 times daily - Oral    Class: E-Prescribe     tacrolimus (GENERIC EQUIVALENT) 0.5 MG capsule    30 capsule 2019     Sig: HOLD for dose change    Class: Historical     tacrolimus (GENERIC EQUIVALENT) 1 MG capsule    180 capsule 2019     Sig - Route: Take 3 capsules (3 mg) by mouth 2 times daily Total dose = 8mg - Oral    Class: Historical     tacrolimus (GENERIC EQUIVALENT) 5 MG capsule    120  capsule 9/20/2019     Sig - Route: Take 1 capsule (5 mg) by mouth 2 times daily Total dose = 8mg - Oral    Class: Historical          Possible Immunosuppression-related side effects:   []             headache  []             vivid dreams  []             irritability  []             cognitive difficuties  []             fine tremor  []             nausea  []             diarrhea  []             neuropathy      []             edema  []             renal calcineurin toxicity  []             hyperkalemia  []             post-transplant diabetes  []             decreased appetite  []             increased appetite  []             other:  []             none    Prescription Medications as of 11/5/2019       Rx Number Disp Refills Start End Last Dispensed Date Next Fill Date Owning Pharmacy    acetaminophen (TYLENOL) 325 MG tablet  100 tablet 3 8/2/2019    [id number] [place], [place] - [address] [facility]    Sig: Take 2 tablets (650 mg) by mouth every 4 hours as needed for mild pain    Class: E-Prescribe    Route: Oral    aspirin (ASA) 81 MG chewable tablet  30 tablet 5 8/13/2019    [facility] [place], [place] - [address] [place] [address]    Sig: Take 1 tablet (81 mg) by mouth daily    Class: E-Prescribe    Route: Oral    atorvastatin (LIPITOR) 10 MG tablet  90 tablet 0 10/8/2019    [id number] - [place], [place] - [address] [facility]    Sig: Take 1 tablet (10 mg) by mouth daily    Class: E-Prescribe    Route: Oral    cinacalcet (SENSIPAR) 30 MG tablet  30 tablet 2 8/22/2019    [id number] - [place], [place] - [address] [facility]    Sig: Take 1 tablet (30 mg) by mouth daily    Class: E-Prescribe    Route: Oral    diphenhydrAMINE (BENADRYL) 25 MG tablet  60 tablet 1 1/17/2018    [id number] - [place], [place] - [address] [facility]    Sig: Take 1-2 tablets (25-50 mg) by  mouth every 6 hours as needed for itching or allergies    Class: E-Prescribe    Route: Oral    fludrocortisone (FLORINEF) 0.1 MG tablet  180 tablet 3 9/3/2019    [facility] #[id number] - [place], [place] - [address] [facility]    Sig: Take 0.1 mg by mouth three times a week     Class: Historical    Route: Oral    hydrOXYzine (ATARAX) 10 MG tablet  20 tablet 0 8/12/2019    [facility] [place], [place] - [address] [facility] [address]    Sig: Take 1 tablet (10 mg) by mouth 3 times daily as needed for anxiety    Class: E-Prescribe    Route: Oral    lactobacillus rhamnosus, GG, (CULTURELL) capsule    8/27/2019        Sig: Take 1 capsule by mouth 2 times daily    Class: Historical    Route: Oral    levothyroxine (SYNTHROID/LEVOTHROID) 25 MCG tablet  105 tablet 1 5/20/2019    [facility] #[id number] - [place], [place] - [address] [facility]    Sig: Take 1 tablet (25 mcg) Tuesday, Thursday, Saturday and Sunday and 1.5 tablets (37.5 mcg) on Monday, Wednesday and Friday every week.    Class: E-Prescribe    Notes to Pharmacy: PHARMACY- DISPENSE 3 MONTH SUPPLY    magnesium oxide (MAG-OX) 400 MG tablet  180 tablet 0 10/7/2019    [facility] #[id number] - [place], [place] - [address] [facility]    Sig: Take 1 tablet (400 mg) by mouth 2 times daily    Class: E-Prescribe    Route: Oral    multivitamin RENAL (NEPHROCAPS/TRIPHROCAPS) 1 MG capsule  30 capsule 2 9/12/2019    [facility] #[id number] - [place], [place] - [address] [facility]    Sig: Take 1 capsule by mouth daily    Class: E-Prescribe    Route: Oral    MYFORTIC (BRAND) 180 MG EC tablet  180 tablet 11 8/19/2019    [facility] [place], [place] - [address] [facility] [address]    Sig: Take 3 tablets (540 mg) by mouth 2 times daily    Class: E-Prescribe    Route: Oral    norethindrone (MICRONOR) 0.35 MG tablet    8/12/2019        Sig: Do not restart until  your follow up appointment with your surgeon. Discuss restart date at that appointment.    Class: No Print Out    ondansetron (ZOFRAN ODT) 4 MG ODT tab  60 tablet 1 4/12/2017    [facility] #[id number] - [place], [place] - [address] [facility]    Sig: Take 1-2 tablets (4-8 mg) by mouth every 8 hours as needed for nausea    Class: E-Prescribe    Route: Oral    pentamidine (NEBUPENT) 300 MG neb solution    8/12/2019    [facility] [place], [place] - [address] [facility]    Sig: Inhale 300 mg into the lungs every 28 days    Class: No Print Out    Route: Inhalation    psyllium (METAMUCIL/KONSYL) capsule  90 capsule 3 8/28/2019 8/27/2020   [facility] #[id number] - [place], [place] - [address] [facility]    Sig: Take 1 capsule by mouth daily    Class: E-Prescribe    Notes to Pharmacy: NDC covered: [id number], [id number], [id number], [id number]    Route: Oral    simethicone (MYLICON) 125 MG chewable tablet    1/29/2019    [facility] #[id number] - [place] - [address] [facility]    Sig: Take 1 tablet (125 mg) by mouth 4 times daily as needed for intestinal gas    Class: Historical    Route: Oral    sodium bicarbonate 650 MG tablet  180 tablet 2 9/30/2019    [id number] - [place] - [address] [facility] [address] [address]    Sig: Take 3 tablets (1,950 mg) by mouth 2 times daily    Class: E-Prescribe    Route: Oral    tacrolimus (GENERIC EQUIVALENT) 0.5 MG capsule  30 capsule 0 9/20/2019        Sig: HOLD for dose change    Class: Historical    tacrolimus (GENERIC EQUIVALENT) 1 MG capsule  180 capsule 11 9/20/2019        Sig: Take 3 capsules (3 mg) by mouth 2 times daily Total dose = 8mg    Class: Historical    Route: Oral    tacrolimus (GENERIC EQUIVALENT) 5 MG capsule  120 capsule 11 9/20/2019        Sig: Take 1 capsule (5 mg) by mouth 2 times daily Total dose = 8mg    Class: Historical    Route: Oral    valGANciclovir (VALCYTE) 450 MG tablet   60 tablet 1 10/4/2019    [facility] #[id number] - [place], [place] - [address] AT [facility] [address]    Sig: Take 2 tablets (900 mg) by mouth daily    Class: E-Prescribe    Route: Oral          O:      General Appearance: in no apparent distress.   Skin: Normal, no rashes or jaundice  Heart: regular rate and rhythm, normal S1 and S2  Lungs: easy respirations, no audible wheezing.  Abdomen: flat, The wound is dry and intact, without hernia. The abdomen is mildly tender. The kidney graft is tender.  There is no ascites.  Extremities: Tremor absent.   Edema: absent.     Transplant Immunosuppression Labs Latest Ref Rng & Units 11/4/2019 10/31/2019 10/28/2019 10/21/2019 10/14/2019   Tacro Level 5.0 - 15.0 ug/L 10.7 8.5 9.4 9.1 9.9   Tacro Level - 11/3 2030 Not Provided 10/27 2030 10/20 2030 10/13 2030   Creat 0.52 - 1.04 mg/dL 1.33(H) 1.25(H) 1.30(H) 1.13(H) 1.34(H)   BUN 7 - 30 mg/dL 33(H) 27 34(H) 24 26   WBC 4.0 - 11.0 10e9/L 4.0 3.4(L) 3.7(L) 3.2(L) 4.1   Neutrophil % - - - - -   ANEU 1.6 - 8.3 10e9/L - - - - -       Chemistries:   Recent Labs   Lab Test 11/04/19  0850   BUN 33*   CR 1.33*   GFRESTIMATED 55*   GLC 92     Lab Results   Component Value Date    A1C 4.8 08/03/2019     Recent Labs   Lab Test 08/30/19  2210   ALBUMIN 3.9   BILITOTAL 0.3   ALKPHOS 167*   AST <3   ALT 16     Urine Studies:  Recent Labs   Lab Test 11/01/19  1148   COLOR Yellow   APPEARANCE Clear   URINEGLC Negative   URINEBILI Negative   URINEKETONE Negative   SG 1.010   UBLD Large*   URINEPH 8.0*   PROTEIN Negative   NITRITE Negative   LEUKEST Negative   RBCU <1   WBCU <1     Recent Labs   Lab Test 09/03/19  0940 12/11/13  1915   UTPG 0.24* 7.42*     Hematology:   Recent Labs   Lab Test 11/04/19  0850 10/31/19  0830 10/28/19  0840   HGB 10.8* 10.1* 10.5*    224 228   WBC 4.0 3.4* 3.7*     Coags:   Recent Labs   Lab Test 08/30/19  2210 08/02/19  1938   INR 0.95 0.94     HLA antibodies:   SA1 Hi Risk Noemy   Date Value Ref  Range Status   09/12/2019 Cw:17  Final     SA1 Mod Risk Noemy   Date Value Ref Range Status   09/12/2019 None  Final     SA2 Hi Risk Noemy   Date Value Ref Range Status   09/12/2019 None  Final     SA2 Mod Risk Noemy   Date Value Ref Range Status   09/12/2019 DR:7 DP:10 14  Final       Assessment: Mona Wagner is doing fairly well s/p DDKT:  Issues we addressed during her visit include:    Plan:    1. Graft function: elevated.  US completed.  Lasix renogram ordered  2. Immunosuppression Management: No change continue cellcept and prograf' .  Complexity of management:Medium.  Contributing factors: anemia  3. Elevated cr:  US completed.  Lasix renogram ordered  4. Abdominal pain:  Heating pad to abdomen  Followup: as directed    Total Time: 25 min,   Counselling Time: 15 min.

## 2019-11-06 ENCOUNTER — HEALTH MAINTENANCE LETTER (OUTPATIENT)
Age: 29
End: 2019-11-06

## 2019-11-11 DIAGNOSIS — I15.1 HTN, KIDNEY TRANSPLANT RELATED: Primary | ICD-10-CM

## 2019-11-11 DIAGNOSIS — Z94.0 KIDNEY REPLACED BY TRANSPLANT: ICD-10-CM

## 2019-11-11 DIAGNOSIS — Z79.899 LONG TERM USE OF DRUG: ICD-10-CM

## 2019-11-11 DIAGNOSIS — Z94.0 HTN, KIDNEY TRANSPLANT RELATED: Primary | ICD-10-CM

## 2019-11-11 LAB
ANION GAP SERPL CALCULATED.3IONS-SCNC: 6 MMOL/L (ref 3–14)
BUN SERPL-MCNC: 36 MG/DL (ref 7–30)
CALCIUM SERPL-MCNC: 9.3 MG/DL (ref 8.5–10.1)
CHLORIDE SERPL-SCNC: 111 MMOL/L (ref 94–109)
CO2 SERPL-SCNC: 22 MMOL/L (ref 20–32)
CREAT SERPL-MCNC: 1.33 MG/DL (ref 0.52–1.04)
ERYTHROCYTE [DISTWIDTH] IN BLOOD BY AUTOMATED COUNT: 12.1 % (ref 10–15)
GFR SERPL CREATININE-BSD FRML MDRD: 55 ML/MIN/{1.73_M2}
GLUCOSE SERPL-MCNC: 95 MG/DL (ref 70–99)
HCT VFR BLD AUTO: 34.8 % (ref 35–47)
HGB BLD-MCNC: 10.7 G/DL (ref 11.7–15.7)
MCH RBC QN AUTO: 29.8 PG (ref 26.5–33)
MCHC RBC AUTO-ENTMCNC: 30.7 G/DL (ref 31.5–36.5)
MCV RBC AUTO: 97 FL (ref 78–100)
PLATELET # BLD AUTO: 243 10E9/L (ref 150–450)
POTASSIUM SERPL-SCNC: 4.6 MMOL/L (ref 3.4–5.3)
RBC # BLD AUTO: 3.59 10E12/L (ref 3.8–5.2)
SODIUM SERPL-SCNC: 139 MMOL/L (ref 133–144)
WBC # BLD AUTO: 3.4 10E9/L (ref 4–11)

## 2019-11-11 PROCEDURE — 80197 ASSAY OF TACROLIMUS: CPT | Performed by: INTERNAL MEDICINE

## 2019-11-11 PROCEDURE — 85027 COMPLETE CBC AUTOMATED: CPT | Performed by: INTERNAL MEDICINE

## 2019-11-11 PROCEDURE — 80048 BASIC METABOLIC PNL TOTAL CA: CPT | Performed by: INTERNAL MEDICINE

## 2019-11-12 ENCOUNTER — TELEPHONE (OUTPATIENT)
Dept: PHARMACY | Facility: CLINIC | Age: 29
End: 2019-11-12

## 2019-11-12 DIAGNOSIS — Z94.0 KIDNEY REPLACED BY TRANSPLANT: Primary | ICD-10-CM

## 2019-11-12 LAB
TACROLIMUS BLD-MCNC: 11.1 UG/L (ref 5–15)
TME LAST DOSE: NORMAL H

## 2019-11-12 RX ORDER — TACROLIMUS 5 MG/1
5 CAPSULE ORAL 2 TIMES DAILY
Qty: 60 CAPSULE | Refills: 11 | Status: SHIPPED | OUTPATIENT
Start: 2019-11-12 | End: 2020-01-31

## 2019-11-12 RX ORDER — TACROLIMUS 0.5 MG/1
CAPSULE ORAL
Qty: 60 CAPSULE | Refills: 11 | Status: ON HOLD
Start: 2019-11-12 | End: 2020-01-26

## 2019-11-12 RX ORDER — TACROLIMUS 1 MG/1
CAPSULE ORAL
Qty: 420 CAPSULE | Refills: 11 | Status: SHIPPED | OUTPATIENT
Start: 2019-11-12 | End: 2020-01-31

## 2019-11-12 NOTE — TELEPHONE ENCOUNTER
Spoke to patient who confirms current Tacrolimus dose of 7.5 mg BID and confirms good 12 hour trough level.  Patient verbalizes understanding to decrease Tacrolimus dose to 7 mg BID.  Patient reports recent BP = 125/85.  Patient also states that she has had a headache X 2 days, pain is reduced with Tylenol.

## 2019-11-12 NOTE — TELEPHONE ENCOUNTER
Anemia Management Note  SUBJECTIVE/OBJECTIVE:  Referred by Dr. Nakul Hill on 2019  Primary Diagnosis: Anemia in Chronic Kidney Disease (N18.3, D63.1)     Secondary Diagnosis:  Chronic Kidney Disease, Stage 3 (N18.3)   Kidney Tx: 2019  Hgb goal range:  9-10  Epo/Darbo: Aranesp  40 mcg  every two weeks for Hgb <10.  In clinic.   Iron regimen:  NA  Labs : 2020  Recent CHARLINE use, transfusion, IV iron: PO Iron and Aranesp   RX/TX plans :2020  No history of stroke, MI and blood clots or cancers  Contact:            Ok to leave message  per consent to communicate dated 05/15/2019                              OK to speak with Jt Aleman () per consent to communicate dated 05/15/2013    Anemia Latest Ref Rng & Units 10/7/2019 10/14/2019 10/21/2019 10/28/2019 10/31/2019 2019 2019   CHARLINE Dose - 40mcg - - - - - -   Hemoglobin 11.7 - 15.7 g/dL 9.3(L) 10.1(L) 9.7(L) 10.5(L) 10.1(L) 10.8(L) 10.7(L)   TSAT 15 - 46 % - - - - - - -   Ferritin 12 - 150 ng/mL - - - - - - -     BP Readings from Last 3 Encounters:   19 132/87   10/07/19 129/83   19 113/74     Wt Readings from Last 2 Encounters:   19 56.5 kg (124 lb 8 oz)   10/07/19 51.9 kg (114 lb 8 oz)           ASSESSMENT:  Hgb:Above goal - recommend hold dose  TSat: at goal >30% Ferritin: Elevated (>1000ng/mL)    PLAN:  Hold Aranesp and RTC for hgb then aranesp if needed in 2 week(s)    Orders needed to be renewed (for next follow-up date) in EPIC: None    Iron labs due:  Due    Plan discussed with:  Mona  Plan provided by:  solomon    NEXT FOLLOW-UP DATE:  2019    Manjula Mckinnon RN   Anemia Services  [address] [address]   [email or web address]   Office : [phone]  Fax: [phone]

## 2019-11-12 NOTE — TELEPHONE ENCOUNTER
ISSUE:   Tacrolimus IR level 11.1 on 11/11, goal 8-10, dose 7.5 mg BID.    PLAN:   Please call patient and confirm this was an accurate 12-hour trough. Verify Tacrolimus IR dose 7.5 mg BID. Confirm no new medications or illness. Confirm no missed doses. If accurate trough and accurate dose, decrease tacrolimus dose to 7 mg BID and repeat labs as scheduled.    Please also record most recent BPs.

## 2019-11-13 ENCOUNTER — TELEPHONE (OUTPATIENT)
Dept: TRANSPLANT | Facility: CLINIC | Age: 29
End: 2019-11-13

## 2019-11-13 ENCOUNTER — HOSPITAL ENCOUNTER (OUTPATIENT)
Dept: NUCLEAR MEDICINE | Facility: CLINIC | Age: 29
Setting detail: NUCLEAR MEDICINE
Discharge: HOME | End: 2019-11-13
Attending: INTERNAL MEDICINE | Admitting: INTERNAL MEDICINE
Payer: MEDICARE

## 2019-11-13 DIAGNOSIS — R79.89 ELEVATED SERUM CREATININE: ICD-10-CM

## 2019-11-13 DIAGNOSIS — Z94.0 KIDNEY REPLACED BY TRANSPLANT: ICD-10-CM

## 2019-11-13 PROCEDURE — 34300033 ZZH RX 343: Performed by: INTERNAL MEDICINE

## 2019-11-13 PROCEDURE — A9562 TC99M MERTIATIDE: HCPCS | Performed by: INTERNAL MEDICINE

## 2019-11-13 PROCEDURE — 78707 K FLOW/FUNCT IMAGE W/O DRUG: CPT

## 2019-11-13 RX ADMIN — TECHNESCAN TC 99M MERTIATIDE 10 MILLICURIE: 1 INJECTION, POWDER, LYOPHILIZED, FOR SOLUTION INTRAVENOUS at 13:20

## 2019-11-13 NOTE — TELEPHONE ENCOUNTER
Post Kidney and Pancreas Transplant Team Conference  Date: 11/13/2019  Transplant Coordinator: Laurence Vazquez     Attendees:  [x]  Dr. Hill  [x] Leonela Brewster LPN     []  Dr. Wilson [x] Cinthia Cox RN [] Kiersten Chang LPN   [x]  Dr. Rios [] Gely Lynn, RN     [] Keeley Li RN [x] Anthony Hendricks, RhinaD   [] Dr. Johnson [x] Melani Vazquez RN    [x] Dr. Durbin [] Virgilio Ryan RN    [] Dr. Pinto [] Maria Teresa Mayberry RN    [] Dr. Delgadillo [] Yesika Wong RN     [] Melanie Ayala RN    [] Surgery Fellow [x] Nancy Maldonado RN    [] Johana Linda NP [] Nathalia Moreno RN        Verbal Plan Read Back:   No changes at this time    Routed to RN Coordinator   Leonela Brewster LPN

## 2019-11-15 ENCOUNTER — TELEPHONE (OUTPATIENT)
Dept: TRANSPLANT | Facility: CLINIC | Age: 29
End: 2019-11-15

## 2019-11-15 DIAGNOSIS — Z94.0 KIDNEY REPLACED BY TRANSPLANT: Primary | ICD-10-CM

## 2019-11-15 DIAGNOSIS — N28.89 OBSTRUCTION OF KIDNEY: ICD-10-CM

## 2019-11-15 NOTE — TELEPHONE ENCOUNTER
Post Kidney and Pancreas Transplant Team Conference  Date: 11/14/2019  Transplant Coordinator: Laurence Vazquez     Attendees, via teleconference:  [x]  Dr. Hill  [] Leonela Brewster LPN     []  Dr. Wilson [] Cinthia Cox, ERMELINDA [] Kiersten Chang LPN   []  Dr. Rios [] Gely Lynn RN     [] Keeley Li RN [] Anthony Hendricks, PharmD   [] Dr. Johnson [x] Melani Vazquez RN    [] Dr. Durbin [] Virgilio Ryan RN    [] Dr. Pinto [] Maria Teresa Mayberry RN    [] Dr. Delgadillo [] Yesika Wong RN     [] Melanie Ayala RN    [] Surgery Fellow [] Nancy Maldonado RN    [] Johana Linda NP [] Nathalia Moreno RN        Verbal Plan Read Back:   Concern for obstruction on renogram.   Discuss with surgery need for PNT.    Routed to RN Coordinator   Laurence Vazquez RN    RNCC spoke with Mona who voiced understanding that there is hydronephrosis and PNT likely necessary. Okay to monitor until next week, as kidney function is still well.

## 2019-11-16 ASSESSMENT — ENCOUNTER SYMPTOMS
NAUSEA: 0
COUGH: 1
BLOATING: 1
HEARTBURN: 1
SPUTUM PRODUCTION: 1
DECREASED LIBIDO: 1
BLOOD IN STOOL: 1
MYALGIAS: 1
NECK PAIN: 1
HEADACHES: 1

## 2019-11-18 ENCOUNTER — TELEPHONE (OUTPATIENT)
Dept: UROLOGY | Facility: CLINIC | Age: 29
End: 2019-11-18

## 2019-11-18 DIAGNOSIS — Z94.0 KIDNEY REPLACED BY TRANSPLANT: Primary | ICD-10-CM

## 2019-11-18 DIAGNOSIS — Z79.899 LONG TERM USE OF DRUG: ICD-10-CM

## 2019-11-18 DIAGNOSIS — E03.9 ACQUIRED HYPOTHYROIDISM: ICD-10-CM

## 2019-11-18 DIAGNOSIS — Z94.0 KIDNEY REPLACED BY TRANSPLANT: ICD-10-CM

## 2019-11-18 LAB
ANION GAP SERPL CALCULATED.3IONS-SCNC: 5 MMOL/L (ref 3–14)
BUN SERPL-MCNC: 32 MG/DL (ref 7–30)
CALCIUM SERPL-MCNC: 9.7 MG/DL (ref 8.5–10.1)
CHLORIDE SERPL-SCNC: 111 MMOL/L (ref 94–109)
CO2 SERPL-SCNC: 23 MMOL/L (ref 20–32)
CREAT SERPL-MCNC: 1.33 MG/DL (ref 0.52–1.04)
ERYTHROCYTE [DISTWIDTH] IN BLOOD BY AUTOMATED COUNT: 11.8 % (ref 10–15)
GFR SERPL CREATININE-BSD FRML MDRD: 55 ML/MIN/{1.73_M2}
GLUCOSE SERPL-MCNC: 92 MG/DL (ref 70–99)
HCT VFR BLD AUTO: 33 % (ref 35–47)
HGB BLD-MCNC: 10.3 G/DL (ref 11.7–15.7)
MCH RBC QN AUTO: 30.1 PG (ref 26.5–33)
MCHC RBC AUTO-ENTMCNC: 31.2 G/DL (ref 31.5–36.5)
MCV RBC AUTO: 97 FL (ref 78–100)
PLATELET # BLD AUTO: 211 10E9/L (ref 150–450)
POTASSIUM SERPL-SCNC: 4.5 MMOL/L (ref 3.4–5.3)
RBC # BLD AUTO: 3.42 10E12/L (ref 3.8–5.2)
SODIUM SERPL-SCNC: 139 MMOL/L (ref 133–144)
TACROLIMUS BLD-MCNC: 8.7 UG/L (ref 5–15)
TME LAST DOSE: NORMAL H
WBC # BLD AUTO: 4.2 10E9/L (ref 4–11)

## 2019-11-18 PROCEDURE — 80197 ASSAY OF TACROLIMUS: CPT | Performed by: INTERNAL MEDICINE

## 2019-11-18 PROCEDURE — 80048 BASIC METABOLIC PNL TOTAL CA: CPT | Performed by: INTERNAL MEDICINE

## 2019-11-18 PROCEDURE — 85027 COMPLETE CBC AUTOMATED: CPT | Performed by: INTERNAL MEDICINE

## 2019-11-18 RX ORDER — LEVOTHYROXINE SODIUM 25 UG/1
TABLET ORAL
Qty: 105 TABLET | Refills: 1 | Status: CANCELLED | OUTPATIENT
Start: 2019-11-18

## 2019-11-18 NOTE — TELEPHONE ENCOUNTER
[facility] Call Center    Phone Message    May a detailed message be left on voicemail: yes    Reason for Call: Dr. Johnson in [place] is referring patient to Dr. Meléndez or Dr. Silver for a diagnosis of hydronephrosis with evidence of obstruction.   Dr. Johnson is requesting patient be seen ASAP.  Please contact patient with appointment options    Action Taken: Message routed to:  [facility] ([facility] Urology

## 2019-11-18 NOTE — TELEPHONE ENCOUNTER
UPDATE:  11/18/2019, 11:50 AM: case discussed with Dr. Johnson in person, who would like to have the patient seen by urology to attempt retro-grade stenting.      Urology referral placed and scheduling request submitted.    RNCC spoke with Mona who voiced understanding that scheduling will be calling her to get her seen by urology, who will determine if is possible to place a stent via cystoscopy.  Mona had questions, as she did have some bleeding with previous stent removal, RNCC answered her questions and advised further discussion with urology.    Mona was agreeable.

## 2019-11-19 ENCOUNTER — MYC REFILL (OUTPATIENT)
Dept: ENDOCRINOLOGY | Facility: CLINIC | Age: 29
End: 2019-11-19

## 2019-11-19 ENCOUNTER — OFFICE VISIT (OUTPATIENT)
Dept: ENDOCRINOLOGY | Facility: CLINIC | Age: 29
End: 2019-11-19
Payer: MEDICARE

## 2019-11-19 VITALS
BODY MASS INDEX: 24.82 KG/M2 | WEIGHT: 127.1 LBS | SYSTOLIC BLOOD PRESSURE: 130 MMHG | DIASTOLIC BLOOD PRESSURE: 85 MMHG | HEART RATE: 79 BPM

## 2019-11-19 DIAGNOSIS — E22.9 PITUITARY MICROADENOMA WITH HYPERPROLACTINEMIA (H): ICD-10-CM

## 2019-11-19 DIAGNOSIS — E03.9 ACQUIRED HYPOTHYROIDISM: Primary | ICD-10-CM

## 2019-11-19 DIAGNOSIS — D35.2 PITUITARY MICROADENOMA WITH HYPERPROLACTINEMIA (H): ICD-10-CM

## 2019-11-19 DIAGNOSIS — L65.9 HAIR LOSS: ICD-10-CM

## 2019-11-19 DIAGNOSIS — E03.9 ACQUIRED HYPOTHYROIDISM: ICD-10-CM

## 2019-11-19 DIAGNOSIS — Z94.0 KIDNEY REPLACED BY TRANSPLANT: Primary | ICD-10-CM

## 2019-11-19 DIAGNOSIS — Z94.0 KIDNEY REPLACED BY TRANSPLANT: ICD-10-CM

## 2019-11-19 LAB
MAGNESIUM SERPL-MCNC: 1.6 MG/DL (ref 1.6–2.3)
PHOSPHATE SERPL-MCNC: 2.2 MG/DL (ref 2.5–4.5)
PROLACTIN SERPL-MCNC: 9 UG/L (ref 3–27)
T4 FREE SERPL-MCNC: 1.15 NG/DL (ref 0.76–1.46)
TSH SERPL DL<=0.005 MIU/L-ACNC: 2.12 MU/L (ref 0.4–4)

## 2019-11-19 PROCEDURE — 84146 ASSAY OF PROLACTIN: CPT | Performed by: INTERNAL MEDICINE

## 2019-11-19 RX ORDER — PENTAMIDINE ISETHIONATE 300 MG/300MG
300 INHALANT RESPIRATORY (INHALATION) ONCE
Status: CANCELLED | OUTPATIENT
Start: 2019-12-05

## 2019-11-19 RX ORDER — ALBUTEROL SULFATE 0.83 MG/ML
2.5 SOLUTION RESPIRATORY (INHALATION) ONCE
Status: CANCELLED | OUTPATIENT
Start: 2019-12-05

## 2019-11-19 RX ORDER — PENTAMIDINE ISETHIONATE 300 MG/300MG
300 INHALANT RESPIRATORY (INHALATION) ONCE
Status: COMPLETED | OUTPATIENT
Start: 2019-11-19 | End: 2019-11-19

## 2019-11-19 RX ORDER — ALBUTEROL SULFATE 0.83 MG/ML
2.5 SOLUTION RESPIRATORY (INHALATION) ONCE
Status: COMPLETED | OUTPATIENT
Start: 2019-11-19 | End: 2019-11-19

## 2019-11-19 RX ADMIN — ALBUTEROL SULFATE 2.5 MG: 0.83 SOLUTION RESPIRATORY (INHALATION) at 11:39

## 2019-11-19 RX ADMIN — PENTAMIDINE ISETHIONATE 300 MG: 300 INHALANT RESPIRATORY (INHALATION) at 11:39

## 2019-11-19 ASSESSMENT — ENCOUNTER SYMPTOMS
SPUTUM PRODUCTION: 1
BLOATING: 1
COUGH: 1
BLOOD IN STOOL: 1
NECK PAIN: 1
MYALGIAS: 1
DECREASED LIBIDO: 1
DIARRHEA: 1

## 2019-11-19 ASSESSMENT — PAIN SCALES - GENERAL: PAINLEVEL: NO PAIN (0)

## 2019-11-19 NOTE — LETTER
11/19/2019       RE: Mona Wagner  [address]  [place] [address]     Dear Colleague,    Thank you for referring your patient, Mona Wagner, to the [facility] ENDOCRINOLOGY at [facility]. Please see a copy of my visit note below.    Endocrinology Resident Clinic Note  Date: November 19, 2019  Follow-up for: hypothyroidism and hyperprolactinemia       HPI   Katlyn Wagner is a 27 yo woman w/ PMHx of suspected IgA nephropathy & ESRD s/p kidney transplant 8/3/19, immunosuppressed w/ tacrolimus, galactorrhea secondary to hyperprolactinemia thought to be secondary to hypothyroidism and possible microadenoma, hypothyroidism who presents for follow-up of her hypothyroidism, hyperprolactinemia, and possible pituitary microadenoma. She was recently seen in clinic 5/2019 and had her kidney transplant 8/3/2019.    Briefly, her initial galactorrhea improved when she started thyroid-replacement therapy in 2014. Prolactin levels remained elevated but stable. MRI w/ contrast in 2017 showed a possible 6.7-5.3mm microadenoma. Her last pituitary MRI w/o contrast did not show a definite sellar masses, but the study had to be done without contrast. This raised into question whether pt had active microadenoma vs hyperprolactinemia associated w/ ESRD vs associated w/ hypothyroidism. She never saw a neurosurgeon or experienced peripheral vision changes.     She had one isolated episode of galactorrhea 5/2019 where she expressed milky, non-blood tinged secretions from it, similar to previous episodes of galactorrhea. Her prolactin level was 51, not significantly elevated, thus the decision was made to hold off on the MRI and continue to clinically monitor. Since then, she has not had another episode of galactorrhea. She denied any breast tenderness, expressions, or irritation and erythema from her breast. No acute vision changes or changes in visual fields (able to drive w/o concerns and has not  noticed changes in her peripheral visions). Denied recent headaches. Does note headaches after her transplant, but since her tacrolimus levels have been optimized and at goals, denied any recent headaches. She is now off the keppra.     Otherwise, she feels well after the kidney transplant. Has an appointment this Friday to see Urology about placing a stent in for hydronephrosis of the transplanted kidney. Her sister also just had her kidney transplant this October. Her appetite has improved significantly and she has had a 10 pounds weight gain, thought primarily to eating more given the increase in appetite and the lifting of her dietary restrictions. Denied any changes in her energy level from baseline. Denied dry skin, or sleeping excessively. Has chronic fatigue.     She does report noticing some hair loss when she brushes. The last time she had hair loss was after she had an episode of endocarditis. She is concerned her stress from the recent kidney transplant is causing the hair loss since she has noticed when she is under extreme stress, her hair tends to fall out more. Denied eyebrow/eyelash/axillary/pubic hair loss. No LE swelling, flu-like symptoms, fevers/chills, night sweats.     She was told to stop her OCP for 2 months during the transplant. She recently restarted her Micronor this Oct 2019 and has had what sounds like a period every other week for 5-7 days since then. When she was on Micronor in the past, she never had a period with this OCP, thus was wondering what is an ideal next course of action. She does not want a Nexplanon or IUD (does not like foreign objects in her body). Does not smoke, does not drink alcohol. Currently sexually active.     REVIEW OF SYSTEMS  11 point ROS is as detailed in the HPI above, as below or negative    Answers for HPI/ROS submitted by the patient on 11/16/2019   General Symptoms: No  Skin Symptoms: No  HENT Symptoms: Yes  EYE SYMPTOMS: No  HEART SYMPTOMS: No  LUNG  SYMPTOMS: Yes  INTESTINAL SYMPTOMS: Yes  URINARY SYMPTOMS: No  GYNECOLOGIC SYMPTOMS: Yes  BREAST SYMPTOMS: No  SKELETAL SYMPTOMS: Yes  BLOOD SYMPTOMS: Yes  NERVOUS SYSTEM SYMPTOMS: Yes  MENTAL HEALTH SYMPTOMS: No  Ear pain: Yes  Gum tenderness: Yes  Cough: Yes  Sputum or phlegm: Yes  Heart burn or indigestion: Yes  Nausea: No  Bloating: Yes  Blood in stool: Yes  Muscle aches: Yes  Neck pain: Yes  Anemia: Yes  Headache: Yes  Bleeding or spotting between periods: Yes  Irregular periods: Yes  Reduced libido: Yes  Difficulty with sexual arousal: Yes    Past Medical History  Past Medical History:   Diagnosis Date     Anemia in chronic kidney disease      Dialysis patient (H)      End stage renal disease on dialysis (H)      History of DVT (deep vein thrombosis) 2014     History of seizure 2014     Hypertension      Hypothyroid      Kidney transplanted 08/03/2019    DCD DDKT. Induction with thymo 6mg/kg.     Pituitary adenoma (H)      Medications  Current Outpatient Medications   Medication     acetaminophen (TYLENOL) 325 MG tablet     aspirin (ASA) 81 MG chewable tablet     atorvastatin (LIPITOR) 10 MG tablet     cinacalcet (SENSIPAR) 30 MG tablet     diphenhydrAMINE (BENADRYL) 25 MG tablet     fludrocortisone (FLORINEF) 0.1 MG tablet     hydrOXYzine (ATARAX) 10 MG tablet     levothyroxine (SYNTHROID/LEVOTHROID) 25 MCG tablet     magnesium oxide (MAG-OX) 400 MG tablet     multivitamin RENAL (NEPHROCAPS/TRIPHROCAPS) 1 MG capsule     MYFORTIC (BRAND) 180 MG EC tablet     norethindrone (MICRONOR) 0.35 MG tablet     ondansetron (ZOFRAN ODT) 4 MG ODT tab     pentamidine (NEBUPENT) 300 MG neb solution     psyllium (METAMUCIL/KONSYL) capsule     simethicone (MYLICON) 125 MG chewable tablet     sodium bicarbonate 650 MG tablet     tacrolimus (GENERIC EQUIVALENT) 1 MG capsule     tacrolimus (GENERIC EQUIVALENT) 5 MG capsule     lactobacillus rhamnosus, GG, (CULTURELL) capsule     tacrolimus (GENERIC EQUIVALENT) 0.5 MG capsule      No current facility-administered medications for this visit.        Current Outpatient Medications   Medication Sig Dispense Refill     acetaminophen (TYLENOL) 325 MG tablet Take 2 tablets (650 mg) by mouth every 4 hours as needed for mild pain 100 tablet 3     aspirin (ASA) 81 MG chewable tablet Take 1 tablet (81 mg) by mouth daily 30 tablet 5     atorvastatin (LIPITOR) 10 MG tablet Take 1 tablet (10 mg) by mouth daily 90 tablet 0     cinacalcet (SENSIPAR) 30 MG tablet Take 1 tablet (30 mg) by mouth daily 30 tablet 2     diphenhydrAMINE (BENADRYL) 25 MG tablet Take 1-2 tablets (25-50 mg) by mouth every 6 hours as needed for itching or allergies 60 tablet 1     fludrocortisone (FLORINEF) 0.1 MG tablet Take 0.1 mg by mouth three times a week  180 tablet 3     hydrOXYzine (ATARAX) 10 MG tablet Take 1 tablet (10 mg) by mouth 3 times daily as needed for anxiety 20 tablet 0     levothyroxine (SYNTHROID/LEVOTHROID) 25 MCG tablet Take 1 tablet (25 mcg) Tuesday, Thursday, Saturday and Sunday and 1.5 tablets (37.5 mcg) on Monday, Wednesday and Friday every week. 105 tablet 1     magnesium oxide (MAG-OX) 400 MG tablet Take 1 tablet (400 mg) by mouth 2 times daily 180 tablet 0     multivitamin RENAL (NEPHROCAPS/TRIPHROCAPS) 1 MG capsule Take 1 capsule by mouth daily 30 capsule 2     MYFORTIC (BRAND) 180 MG EC tablet Take 3 tablets (540 mg) by mouth 2 times daily 180 tablet 11     norethindrone (MICRONOR) 0.35 MG tablet Do not restart until your follow up appointment with your surgeon. Discuss restart date at that appointment.       ondansetron (ZOFRAN ODT) 4 MG ODT tab Take 1-2 tablets (4-8 mg) by mouth every 8 hours as needed for nausea 60 tablet 1     pentamidine (NEBUPENT) 300 MG neb solution Inhale 300 mg into the lungs every 28 days       psyllium (METAMUCIL/KONSYL) capsule Take 1 capsule by mouth daily 90 capsule 3     simethicone (MYLICON) 125 MG chewable tablet Take 1 tablet (125 mg) by mouth 4 times daily as  needed for intestinal gas       sodium bicarbonate 650 MG tablet Take 3 tablets (1,950 mg) by mouth 2 times daily 180 tablet 2     tacrolimus (GENERIC EQUIVALENT) 1 MG capsule Total dose = 7 mg  capsule 11     tacrolimus (GENERIC EQUIVALENT) 5 MG capsule Take 1 capsule (5 mg) by mouth 2 times daily Total dose = 7 mg BID 60 capsule 11     lactobacillus rhamnosus, GG, (CULTURELL) capsule Take 1 capsule by mouth 2 times daily       tacrolimus (GENERIC EQUIVALENT) 0.5 MG capsule HOLD (Patient not taking: Reported on 11/19/2019) 60 capsule 11       Allergies  Allergies   Allergen Reactions     Chlorhexidine Rash     Rash at site     Sulfa Drugs Rash     Muscle stiffness of neck     Dapsone      Methemoglobinemia     Furosemide Other (See Comments)     Skin flushing     Lisinopril Swelling     angioedema         Family History  family history includes Cerebrovascular Disease in her father and sister; Diabetes in her sister; Hypertension in her sister; Kidney Disease in her mother and sister.    Social History  Social History     Tobacco Use     Smoking status: Never Smoker     Smokeless tobacco: Never Used   Substance Use Topics     Alcohol use: No     Alcohol/week: 0.0 standard drinks     Drug use: No       Physical Exam  /85   Pulse 79   Wt 57.7 kg (127 lb 1.6 oz)   BMI 24.82 kg/m     Body mass index is 24.82 kg/m .   GENERAL: Appropriately dressed, appears well. Not in acute distress. Pleasant and conversational  SKIN: positive for some thinning of hair diffusely, noticeably around her temples; eyebrows extend over lateral corners of each eye. Skin normal color, normal temperature and texture.   EYES: PERRL, EOMI, convergence intact. No conjunctival icterus or redness,  no proptosis, stare, or lid retraction  NECK: No visible masses. No palpable adenopathy, or masses.   THYROID: no thyromegaly or palpable nodules   RESP: Lungs clear to auscultation bilaterally. No wheezes, crackles, or rhonchi  CARDIAC:  Loud S2. Regular rate and rhythm, no m/r/g, No S3/S4.   : breasts- negrita stage 4. No erythema or discharge from areola and nipples.   NEURO: awake, alert, responds appropriately to questions. Cranial nerves intact. Moves all extremities. Gait normal.  No tremor of the outstretched hand.   EXTREMITIES: No clubbing, cyanosis, or edema. AV fistula on left forearm, palpable thrill and bruit present.   PSYCH: appropriate mood and affect    DATA REVIEW  TSH   Date Value Ref Range Status   08/21/2019 2.57 0.40 - 4.00 mU/L Final     T4 Total   Date Value Ref Range Status   07/06/2017 9.6 4.5 - 13.9 ug/dL Final     T4 Free   Date Value Ref Range Status   08/21/2019 1.05 0.76 - 1.46 ng/dL Final       ASSESSMENT/PLAN:   Katlyn Wagner is a very pleasant 29 yo woman w/ PMHx of suspected IgA nephropathy & ESRD s/p kidney transplant 8/3/19, immunosuppressed w/ tacrolimus, galactorrhea secondary to hyperprolactinemia thought to be secondary to hypothyroidism and possible microadenoma, hypothyroidism who presents for follow-up of her hypothyroidism, hyperprolactinemia, and possible pituitary microadenoma. She has been doing well after the kidney transplant, no recurrent episodes of galactorrhea.     1. Galactorrhea and hyperprolactinemia  First episode of galactorrhea in 2014 w/ hyperprolactinemia, improved w/ thyroid hormone replacement, suspect secondary to hypothyroidism and slow clearance d/t ESRD. Initial MRI w/ concern for microadenoma, however, repeat MRI in 2017 w/o changes and did not show definitive evidence of a microadenoma, however, contrast was not used to fully evaluate for this given her ESRD. She had one isolated episode of galactorrhea in 05/2019 w/ a high, but not significantly abnormal prolactin level. Plan was to clinically monitor for recurrences. She has not had recurrences since earlier this year. No neurological symptoms or changes from her baseline that would warrant a repeat MRI today, which patient was in  agreement with.   - repeat thyroid labs and prolactin level today  - continue to monitor clinically   - if prolactin significantly elevated than her baseline or has neuro changes, would discuss contrast use with nephrology prior to obtaining a MRI pituitary at that time   - will consider cabergoline depending on clinical course   - RTC in 3-4 months for follow-up     2. Hypothyroidism  Reports taking levothyroxine w/ tacrolimus. Clinically euthyroid after transplant, actually feels better after the transplant w/ increase in appetite and subsequent 10 pounds weight gain also from liberalization of diet. Discussed ok to take levothyroxine w/ tacrolimus as long as nephrology is ok with this. Given her recent weight gain and kidney transplant, will check TSH and FT4 today in clinic w/ plans to adjust levothyroxine as needed.   - continue levothyroxine 25mcg 4x weekly and 37.5mcg 3x weekly  - TSH and FT4 today   - follow-up in 3-4 months     3. Hair loss   This is an ongoing problem. Last reported episode of hair loss was after her endocarditis episode. Reported improvement, then recurrent episode of hair loss after the kidney transplant. Diffuse w/o obvious bald spot on physical exam. Androgen levels (free/total testosterone, SHBG, androstenedione, and DHEA-S) were normal from last visit, unlikely to be hypoandrogenism. Unlikely thyroid as patient has been biochemically euthyroid. Likely stress response from acute events.   - patient opted to clinically monitor and then see Dermatology if not improving   - referral placed to Dermatology    4. Oligomenorrhea   Patient on Micronor since 2016 w/o issues, was told to hold OCP for 2 months during transplant period. Recently restarted Micronor Oct 2019 and has had periods/spotting every other week for 5-7 days w/ each episode. Given her history of dialysis catheter thrombosis complicated by intracranial hemorrhage secondary to warfarin use when she was on a primarily estrogen  based OCP, and has otherwise been doing well on Micronor, opt to continue w/ Micronor for now and see if her spotting improves. Patient agreeable to this plan.     Patient on OCP for birth control purposes related to her tacrolimus. Discussed her hyperprolactinemia is also an alternative birth control given suppression of GnRH and subsequently LH/FSH. If she wishes to have children in the future, she would need a close discussion w/ both Nephrology and Endocrine on her options given her unique situation.   - continue Micronor  - return to clinic in 3-4 months for follow-up       Orders Placed This Encounter   Procedures     TSH     Last Lab Result: TSH (mU/L)       Date                     Value                 08/21/2019               2.57             ----------     Standing Status:   Future     Number of Occurrences:   1     Standing Expiration Date:   11/18/2020     T4 free     Last Lab Result: T4 Total (ug/dL)       Date                     Value                 07/06/2017               9.6              ----------  T4 Free (ng/dL)       Date                     Value                 08/21/2019               1.05             ----------     Standing Status:   Future     Number of Occurrences:   1     Standing Expiration Date:   11/18/2020     Prolactin     Standing Status:   Future     Number of Occurrences:   1     Standing Expiration Date:   11/18/2020     Pt seen and discussed w/ Dr. Coles.    [facility]  Internal Medicine and Pediatrics PGY-1  [phone]    ATTENDING NOTE    I have seen and examined the patient, reviewed and edited the resident's note, and agree with the plan of care.    Nidhi Coles MD PhD    Division of Endocrinology and Diabetes

## 2019-11-19 NOTE — PATIENT INSTRUCTIONS
Continue your current doses of levothyroxine.    Go downstairs and get labs today (TSH, Free T4, Prolactin). We will call you with the results and maybe change your levothyroxine dosing as needed.     Dermatology referral has been placed, please call at your convenience.     Come back to Clinic in 3-4 months for a follow-up with Dr. Coles.

## 2019-11-19 NOTE — PROGRESS NOTES
Pentamidine Treatment given per orders of Dr. Deleon.    Pre-treatment Breath Sounds: Clear   Pre-treatment Vital Signs: Sat 00%  HR 75    Pt given 2.5 mg Albuterol nebulized via hand held nebulizer followed by 300 mg Pentamidine in 6 mL sterile water via filtered nebulizer.     Post-treatment Breath Sounds: Clear   Post treatment Vital Signs:  Sat 100%  HR 83    Pt tolerated the treatment well. Zero adverse events noted. Pt reports no adverse reactions. Patient stated that she gets a little shaky for approximately 30 minutes after treatment.  LV SIMMONS

## 2019-11-19 NOTE — TELEPHONE ENCOUNTER
MEDICAL RECORDS REQUEST   [facility]  Urology Clinic  [address] [place] [address]  PHONE: [phone]  Fax: [phone]        FUTURE VISIT INFORMATION                                                   BONIFACIO Salgado: 1990 scheduled for future visit at [facility]    APPOINTMENT INFORMATION:    Date: 19 10AM     Provider:  Wally Britt MD     Reason for Visit/Diagnosis: Hydronephrosis with evidence of obstruction    REFERRAL INFORMATION:    Referring provider:  Dorian Johnson     Specialty: MD    Referring providers clinic:  [facility]     Clinic contact number:  [phone]    RECORDS REQUESTED FOR VISIT                                                     NOTES  STATUS/DETAILS   OFFICE NOTE from referring provider  yes   OFFICE NOTE from other specialist  no   DISCHARGE SUMMARY from hospital  yes   DISCHARGE REPORT from the ER  yes   OPERATIVE REPORT  yes   MEDICATION LIST  yes   KIDNEY STONE     CT STONE  yes     PRE-VISIT CHECKLIST      Record collection complete Yes- All recs in epic   Image in  PACS   Appointment appropriately scheduled           (right time/right provider) Yes   MyChart activation Yes   Questionnaire complete If no, please explain: In process      Completed by: Susan Plaza

## 2019-11-19 NOTE — PROGRESS NOTES
Endocrinology Resident Clinic Note  Date: November 19, 2019  Follow-up for: hypothyroidism and hyperprolactinemia       HPI   Katlyn Wagner is a 29 yo woman w/ PMHx of suspected IgA nephropathy & ESRD s/p kidney transplant 8/3/19, immunosuppressed w/ tacrolimus, galactorrhea secondary to hyperprolactinemia thought to be secondary to hypothyroidism and possible microadenoma, hypothyroidism who presents for follow-up of her hypothyroidism, hyperprolactinemia, and possible pituitary microadenoma. She was recently seen in clinic 5/2019 and had her kidney transplant 8/3/2019.    Briefly, her initial galactorrhea improved when she started thyroid-replacement therapy in 2014. Prolactin levels remained elevated but stable. MRI w/ contrast in 2017 showed a possible 6.7-5.3mm microadenoma. Her last pituitary MRI w/o contrast did not show a definite sellar masses, but the study had to be done without contrast. This raised into question whether pt had active microadenoma vs hyperprolactinemia associated w/ ESRD vs associated w/ hypothyroidism. She never saw a neurosurgeon or experienced peripheral vision changes.     She had one isolated episode of galactorrhea 5/2019 where she expressed milky, non-blood tinged secretions from it, similar to previous episodes of galactorrhea. Her prolactin level was 51, not significantly elevated, thus the decision was made to hold off on the MRI and continue to clinically monitor. Since then, she has not had another episode of galactorrhea. She denied any breast tenderness, expressions, or irritation and erythema from her breast. No acute vision changes or changes in visual fields (able to drive w/o concerns and has not noticed changes in her peripheral visions). Denied recent headaches. Does note headaches after her transplant, but since her tacrolimus levels have been optimized and at goals, denied any recent headaches. She is now off the keppra.     Otherwise, she feels well after the kidney  transplant. Has an appointment this Friday to see Urology about placing a stent in for hydronephrosis of the transplanted kidney. Her sister also just had her kidney transplant this October. Her appetite has improved significantly and she has had a 10 pounds weight gain, thought primarily to eating more given the increase in appetite and the lifting of her dietary restrictions. Denied any changes in her energy level from baseline. Denied dry skin, or sleeping excessively. Has chronic fatigue.     She does report noticing some hair loss when she brushes. The last time she had hair loss was after she had an episode of endocarditis. She is concerned her stress from the recent kidney transplant is causing the hair loss since she has noticed when she is under extreme stress, her hair tends to fall out more. Denied eyebrow/eyelash/axillary/pubic hair loss. No LE swelling, flu-like symptoms, fevers/chills, night sweats.     She was told to stop her OCP for 2 months during the transplant. She recently restarted her Micronor this Oct 2019 and has had what sounds like a period every other week for 5-7 days since then. When she was on Micronor in the past, she never had a period with this OCP, thus was wondering what is an ideal next course of action. She does not want a Nexplanon or IUD (does not like foreign objects in her body). Does not smoke, does not drink alcohol. Currently sexually active.     REVIEW OF SYSTEMS  11 point ROS is as detailed in the HPI above, as below or negative    Answers for HPI/ROS submitted by the patient on 11/16/2019   General Symptoms: No  Skin Symptoms: No  HENT Symptoms: Yes  EYE SYMPTOMS: No  HEART SYMPTOMS: No  LUNG SYMPTOMS: Yes  INTESTINAL SYMPTOMS: Yes  URINARY SYMPTOMS: No  GYNECOLOGIC SYMPTOMS: Yes  BREAST SYMPTOMS: No  SKELETAL SYMPTOMS: Yes  BLOOD SYMPTOMS: Yes  NERVOUS SYSTEM SYMPTOMS: Yes  MENTAL HEALTH SYMPTOMS: No  Ear pain: Yes  Gum tenderness: Yes  Cough: Yes  Sputum or phlegm:  Yes  Heart burn or indigestion: Yes  Nausea: No  Bloating: Yes  Blood in stool: Yes  Muscle aches: Yes  Neck pain: Yes  Anemia: Yes  Headache: Yes  Bleeding or spotting between periods: Yes  Irregular periods: Yes  Reduced libido: Yes  Difficulty with sexual arousal: Yes    Past Medical History  Past Medical History:   Diagnosis Date     Anemia in chronic kidney disease      Dialysis patient (H)      End stage renal disease on dialysis (H)      History of DVT (deep vein thrombosis) 2014     History of seizure 2014     Hypertension      Hypothyroid      Kidney transplanted 08/03/2019    DCD DDKT. Induction with thymo 6mg/kg.     Pituitary adenoma (H)      Medications  Current Outpatient Medications   Medication     acetaminophen (TYLENOL) 325 MG tablet     aspirin (ASA) 81 MG chewable tablet     atorvastatin (LIPITOR) 10 MG tablet     cinacalcet (SENSIPAR) 30 MG tablet     diphenhydrAMINE (BENADRYL) 25 MG tablet     fludrocortisone (FLORINEF) 0.1 MG tablet     hydrOXYzine (ATARAX) 10 MG tablet     levothyroxine (SYNTHROID/LEVOTHROID) 25 MCG tablet     magnesium oxide (MAG-OX) 400 MG tablet     multivitamin RENAL (NEPHROCAPS/TRIPHROCAPS) 1 MG capsule     MYFORTIC (BRAND) 180 MG EC tablet     norethindrone (MICRONOR) 0.35 MG tablet     ondansetron (ZOFRAN ODT) 4 MG ODT tab     pentamidine (NEBUPENT) 300 MG neb solution     psyllium (METAMUCIL/KONSYL) capsule     simethicone (MYLICON) 125 MG chewable tablet     sodium bicarbonate 650 MG tablet     tacrolimus (GENERIC EQUIVALENT) 1 MG capsule     tacrolimus (GENERIC EQUIVALENT) 5 MG capsule     lactobacillus rhamnosus, GG, (CULTURELL) capsule     tacrolimus (GENERIC EQUIVALENT) 0.5 MG capsule     No current facility-administered medications for this visit.        Current Outpatient Medications   Medication Sig Dispense Refill     acetaminophen (TYLENOL) 325 MG tablet Take 2 tablets (650 mg) by mouth every 4 hours as needed for mild pain 100 tablet 3     aspirin (ASA) 81  MG chewable tablet Take 1 tablet (81 mg) by mouth daily 30 tablet 5     atorvastatin (LIPITOR) 10 MG tablet Take 1 tablet (10 mg) by mouth daily 90 tablet 0     cinacalcet (SENSIPAR) 30 MG tablet Take 1 tablet (30 mg) by mouth daily 30 tablet 2     diphenhydrAMINE (BENADRYL) 25 MG tablet Take 1-2 tablets (25-50 mg) by mouth every 6 hours as needed for itching or allergies 60 tablet 1     fludrocortisone (FLORINEF) 0.1 MG tablet Take 0.1 mg by mouth three times a week  180 tablet 3     hydrOXYzine (ATARAX) 10 MG tablet Take 1 tablet (10 mg) by mouth 3 times daily as needed for anxiety 20 tablet 0     levothyroxine (SYNTHROID/LEVOTHROID) 25 MCG tablet Take 1 tablet (25 mcg) Tuesday, Thursday, Saturday and Sunday and 1.5 tablets (37.5 mcg) on Monday, Wednesday and Friday every week. 105 tablet 1     magnesium oxide (MAG-OX) 400 MG tablet Take 1 tablet (400 mg) by mouth 2 times daily 180 tablet 0     multivitamin RENAL (NEPHROCAPS/TRIPHROCAPS) 1 MG capsule Take 1 capsule by mouth daily 30 capsule 2     MYFORTIC (BRAND) 180 MG EC tablet Take 3 tablets (540 mg) by mouth 2 times daily 180 tablet 11     norethindrone (MICRONOR) 0.35 MG tablet Do not restart until your follow up appointment with your surgeon. Discuss restart date at that appointment.       ondansetron (ZOFRAN ODT) 4 MG ODT tab Take 1-2 tablets (4-8 mg) by mouth every 8 hours as needed for nausea 60 tablet 1     pentamidine (NEBUPENT) 300 MG neb solution Inhale 300 mg into the lungs every 28 days       psyllium (METAMUCIL/KONSYL) capsule Take 1 capsule by mouth daily 90 capsule 3     simethicone (MYLICON) 125 MG chewable tablet Take 1 tablet (125 mg) by mouth 4 times daily as needed for intestinal gas       sodium bicarbonate 650 MG tablet Take 3 tablets (1,950 mg) by mouth 2 times daily 180 tablet 2     tacrolimus (GENERIC EQUIVALENT) 1 MG capsule Total dose = 7 mg  capsule 11     tacrolimus (GENERIC EQUIVALENT) 5 MG capsule Take 1 capsule (5 mg) by  mouth 2 times daily Total dose = 7 mg BID 60 capsule 11     lactobacillus rhamnosus, GG, (CULTURELL) capsule Take 1 capsule by mouth 2 times daily       tacrolimus (GENERIC EQUIVALENT) 0.5 MG capsule HOLD (Patient not taking: Reported on 11/19/2019) 60 capsule 11       Allergies  Allergies   Allergen Reactions     Chlorhexidine Rash     Rash at site     Sulfa Drugs Rash     Muscle stiffness of neck     Dapsone      Methemoglobinemia     Furosemide Other (See Comments)     Skin flushing     Lisinopril Swelling     angioedema         Family History  family history includes Cerebrovascular Disease in her father and sister; Diabetes in her sister; Hypertension in her sister; Kidney Disease in her mother and sister.    Social History  Social History     Tobacco Use     Smoking status: Never Smoker     Smokeless tobacco: Never Used   Substance Use Topics     Alcohol use: No     Alcohol/week: 0.0 standard drinks     Drug use: No       Physical Exam  /85   Pulse 79   Wt 57.7 kg (127 lb 1.6 oz)   BMI 24.82 kg/m    Body mass index is 24.82 kg/m .   GENERAL: Appropriately dressed, appears well. Not in acute distress. Pleasant and conversational  SKIN: positive for some thinning of hair diffusely, noticeably around her temples; eyebrows extend over lateral corners of each eye. Skin normal color, normal temperature and texture.   EYES: PERRL, EOMI, convergence intact. No conjunctival icterus or redness,  no proptosis, stare, or lid retraction  NECK: No visible masses. No palpable adenopathy, or masses.   THYROID: no thyromegaly or palpable nodules   RESP: Lungs clear to auscultation bilaterally. No wheezes, crackles, or rhonchi  CARDIAC: Loud S2. Regular rate and rhythm, no m/r/g, No S3/S4.   : breasts- negrita stage 4. No erythema or discharge from areola and nipples.   NEURO: awake, alert, responds appropriately to questions. Cranial nerves intact. Moves all extremities. Gait normal.  No tremor of the outstretched  hand.   EXTREMITIES: No clubbing, cyanosis, or edema. AV fistula on left forearm, palpable thrill and bruit present.   PSYCH: appropriate mood and affect    DATA REVIEW  TSH   Date Value Ref Range Status   08/21/2019 2.57 0.40 - 4.00 mU/L Final     T4 Total   Date Value Ref Range Status   07/06/2017 9.6 4.5 - 13.9 ug/dL Final     T4 Free   Date Value Ref Range Status   08/21/2019 1.05 0.76 - 1.46 ng/dL Final       ASSESSMENT/PLAN:   Katlyn Wagner is a very pleasant 27 yo woman w/ PMHx of suspected IgA nephropathy & ESRD s/p kidney transplant 8/3/19, immunosuppressed w/ tacrolimus, galactorrhea secondary to hyperprolactinemia thought to be secondary to hypothyroidism and possible microadenoma, hypothyroidism who presents for follow-up of her hypothyroidism, hyperprolactinemia, and possible pituitary microadenoma. She has been doing well after the kidney transplant, no recurrent episodes of galactorrhea.     1. Galactorrhea and hyperprolactinemia  First episode of galactorrhea in 2014 w/ hyperprolactinemia, improved w/ thyroid hormone replacement, suspect secondary to hypothyroidism and slow clearance d/t ESRD. Initial MRI w/ concern for microadenoma, however, repeat MRI in 2017 w/o changes and did not show definitive evidence of a microadenoma, however, contrast was not used to fully evaluate for this given her ESRD. She had one isolated episode of galactorrhea in 05/2019 w/ a high, but not significantly abnormal prolactin level. Plan was to clinically monitor for recurrences. She has not had recurrences since earlier this year. No neurological symptoms or changes from her baseline that would warrant a repeat MRI today, which patient was in agreement with.   - repeat thyroid labs and prolactin level today  - continue to monitor clinically   - if prolactin significantly elevated than her baseline or has neuro changes, would discuss contrast use with nephrology prior to obtaining a MRI pituitary at that time   - will  consider cabergoline depending on clinical course   - RTC in 3-4 months for follow-up     2. Hypothyroidism  Reports taking levothyroxine w/ tacrolimus. Clinically euthyroid after transplant, actually feels better after the transplant w/ increase in appetite and subsequent 10 pounds weight gain also from liberalization of diet. Discussed ok to take levothyroxine w/ tacrolimus as long as nephrology is ok with this. Given her recent weight gain and kidney transplant, will check TSH and FT4 today in clinic w/ plans to adjust levothyroxine as needed.   - continue levothyroxine 25mcg 4x weekly and 37.5mcg 3x weekly  - TSH and FT4 today   - follow-up in 3-4 months     3. Hair loss   This is an ongoing problem. Last reported episode of hair loss was after her endocarditis episode. Reported improvement, then recurrent episode of hair loss after the kidney transplant. Diffuse w/o obvious bald spot on physical exam. Androgen levels (free/total testosterone, SHBG, androstenedione, and DHEA-S) were normal from last visit, unlikely to be hypoandrogenism. Unlikely thyroid as patient has been biochemically euthyroid. Likely stress response from acute events.   - patient opted to clinically monitor and then see Dermatology if not improving   - referral placed to Dermatology    4. Oligomenorrhea   Patient on Micronor since 2016 w/o issues, was told to hold OCP for 2 months during transplant period. Recently restarted Micronor Oct 2019 and has had periods/spotting every other week for 5-7 days w/ each episode. Given her history of dialysis catheter thrombosis complicated by intracranial hemorrhage secondary to warfarin use when she was on a primarily estrogen based OCP, and has otherwise been doing well on Micronor, opt to continue w/ Micronor for now and see if her spotting improves. Patient agreeable to this plan.     Patient on OCP for birth control purposes related to her tacrolimus. Discussed her hyperprolactinemia is also an  alternative birth control given suppression of GnRH and subsequently LH/FSH. If she wishes to have children in the future, she would need a close discussion w/ both Nephrology and Endocrine on her options given her unique situation.   - continue Micronor  - return to clinic in 3-4 months for follow-up       Orders Placed This Encounter   Procedures     TSH     Last Lab Result: TSH (mU/L)       Date                     Value                 08/21/2019               2.57             ----------     Standing Status:   Future     Number of Occurrences:   1     Standing Expiration Date:   11/18/2020     T4 free     Last Lab Result: T4 Total (ug/dL)       Date                     Value                 07/06/2017               9.6              ----------  T4 Free (ng/dL)       Date                     Value                 08/21/2019               1.05             ----------     Standing Status:   Future     Number of Occurrences:   1     Standing Expiration Date:   11/18/2020     Prolactin     Standing Status:   Future     Number of Occurrences:   1     Standing Expiration Date:   11/18/2020     Pt seen and discussed w/ Dr. Coles.    [facility]  Internal Medicine and Pediatrics PGY-1  [phone]    ATTENDING NOTE    I have seen and examined the patient, reviewed and edited the resident's note, and agree with the plan of care.    Nidhi Coles MD PhD    Division of Endocrinology and Diabetes

## 2019-11-20 ENCOUNTER — PRE VISIT (OUTPATIENT)
Dept: UROLOGY | Facility: CLINIC | Age: 29
End: 2019-11-20

## 2019-11-20 NOTE — TELEPHONE ENCOUNTER
New patient consult for moderate hydronephrosis in the right lower quadrant renal transplant.  Records available in [facility].

## 2019-11-21 RX ORDER — LEVOTHYROXINE SODIUM 25 UG/1
TABLET ORAL
Qty: 105 TABLET | Refills: 1 | Status: SHIPPED | OUTPATIENT
Start: 2019-11-21 | End: 2020-04-24

## 2019-11-22 ENCOUNTER — PRE VISIT (OUTPATIENT)
Dept: UROLOGY | Facility: CLINIC | Age: 29
End: 2019-11-22

## 2019-11-22 ENCOUNTER — PREP FOR PROCEDURE (OUTPATIENT)
Dept: UROLOGY | Facility: CLINIC | Age: 29
End: 2019-11-22

## 2019-11-22 ENCOUNTER — OFFICE VISIT (OUTPATIENT)
Dept: UROLOGY | Facility: CLINIC | Age: 29
End: 2019-11-22
Payer: MEDICARE

## 2019-11-22 ENCOUNTER — TELEPHONE (OUTPATIENT)
Dept: UROLOGY | Facility: CLINIC | Age: 29
End: 2019-11-22

## 2019-11-22 VITALS
HEART RATE: 87 BPM | HEIGHT: 60 IN | DIASTOLIC BLOOD PRESSURE: 84 MMHG | WEIGHT: 127 LBS | BODY MASS INDEX: 24.94 KG/M2 | SYSTOLIC BLOOD PRESSURE: 124 MMHG

## 2019-11-22 DIAGNOSIS — N28.89 OBSTRUCTION OF KIDNEY: ICD-10-CM

## 2019-11-22 DIAGNOSIS — N13.5 STRICTURE OR KINKING OF URETER: Primary | ICD-10-CM

## 2019-11-22 DIAGNOSIS — Z94.0 KIDNEY REPLACED BY TRANSPLANT: ICD-10-CM

## 2019-11-22 PROBLEM — N18.6 END STAGE RENAL DISEASE (H): Status: ACTIVE | Noted: 2019-11-22

## 2019-11-22 LAB
ALBUMIN UR-MCNC: NEGATIVE MG/DL
APPEARANCE UR: CLEAR
BACTERIA #/AREA URNS HPF: ABNORMAL /HPF
BILIRUB UR QL STRIP: NEGATIVE
COLOR UR AUTO: YELLOW
GLUCOSE UR STRIP-MCNC: NEGATIVE MG/DL
HGB UR QL STRIP: ABNORMAL
KETONES UR STRIP-MCNC: NEGATIVE MG/DL
LEUKOCYTE ESTERASE UR QL STRIP: NEGATIVE
NITRATE UR QL: NEGATIVE
PH UR STRIP: 7 PH (ref 5–7)
RBC #/AREA URNS AUTO: <1 /HPF (ref 0–2)
SOURCE: ABNORMAL
SP GR UR STRIP: 1.01 (ref 1–1.03)
SQUAMOUS #/AREA URNS AUTO: 1 /HPF (ref 0–1)
UROBILINOGEN UR STRIP-MCNC: 0 MG/DL (ref 0–2)
WBC #/AREA URNS AUTO: 1 /HPF (ref 0–5)

## 2019-11-22 RX ORDER — SODIUM POLYSTYRENE SULFONATE 15 G/60ML
SUSPENSION ORAL; RECTAL
COMMUNITY
Start: 2019-08-30 | End: 2019-11-29

## 2019-11-22 RX ORDER — CEFAZOLIN SODIUM 1 G/50ML
1 INJECTION, SOLUTION INTRAVENOUS SEE ADMIN INSTRUCTIONS
Status: CANCELLED | OUTPATIENT
Start: 2019-11-22

## 2019-11-22 RX ORDER — OXYCODONE HYDROCHLORIDE 5 MG/1
TABLET ORAL
COMMUNITY
Start: 2019-08-13 | End: 2019-11-29

## 2019-11-22 RX ORDER — ACETAMINOPHEN 325 MG/1
975 TABLET ORAL ONCE
Status: CANCELLED | OUTPATIENT
Start: 2019-11-22 | End: 2019-11-22

## 2019-11-22 RX ORDER — POLYETHYLENE GLYCOL 3350 17 G/17G
17 POWDER, FOR SOLUTION ORAL
COMMUNITY
End: 2019-12-03

## 2019-11-22 RX ORDER — EPINEPHRINE 0.3 MG/.3ML
0.3 INJECTION SUBCUTANEOUS
COMMUNITY
End: 2020-07-14

## 2019-11-22 RX ORDER — MYCOPHENOLATE MOFETIL 250 MG/1
CAPSULE ORAL
COMMUNITY
Start: 2019-09-23 | End: 2019-11-29

## 2019-11-22 RX ORDER — AMLODIPINE BESYLATE 10 MG/1
TABLET ORAL
COMMUNITY
Start: 2019-08-12 | End: 2019-11-29

## 2019-11-22 RX ORDER — CEFAZOLIN SODIUM 2 G/50ML
2 SOLUTION INTRAVENOUS
Status: CANCELLED | OUTPATIENT
Start: 2019-11-22

## 2019-11-22 RX ORDER — BUMETANIDE 2 MG/1
TABLET ORAL
COMMUNITY
Start: 2019-08-12 | End: 2019-11-29

## 2019-11-22 RX ORDER — VALGANCICLOVIR 450 MG/1
TABLET, FILM COATED ORAL
COMMUNITY
Start: 2019-10-27 | End: 2019-11-27

## 2019-11-22 RX ORDER — NIMODIPINE 30 MG/1
CAPSULE, LIQUID FILLED ORAL
COMMUNITY
Start: 2015-02-26 | End: 2019-11-29

## 2019-11-22 RX ORDER — SEVELAMER CARBONATE 800 MG/1
TABLET, FILM COATED ORAL
COMMUNITY
Start: 2019-08-07 | End: 2019-11-29

## 2019-11-22 RX ORDER — DOCUSATE SODIUM 100 MG/1
100 CAPSULE, LIQUID FILLED ORAL
COMMUNITY
End: 2019-12-03

## 2019-11-22 ASSESSMENT — PAIN SCALES - GENERAL: PAINLEVEL: NO PAIN (0)

## 2019-11-22 ASSESSMENT — MIFFLIN-ST. JEOR: SCORE: 1237.57

## 2019-11-22 NOTE — PATIENT INSTRUCTIONS
Schedule ultrasound in 1-2 weeks.    Schedule surgery with Dr. Britt.    It was a pleasure meeting with you today.  Thank you for allowing me and my team the privilege of caring for you today.  YOU are the reason we are here, and I truly hope we provided you with the excellent service you deserve.  Please let us know if there is anything else we can do for you so that we can be sure you are leaving completely satisfied with your care experience.        Cris Mcginnis, CMA

## 2019-11-22 NOTE — NURSING NOTE
Chief Complaint   Patient presents with     Consult     New patient consult for hydronephrosis in transplanted kidney     Cris Mcginnis, CMA

## 2019-11-22 NOTE — LETTER
2019       RE: Mona Wagner  [address]  [place] [address]     Dear Colleague,    Thank you for referring your patient, Mona Wagner, to the [facility] at [facility]. Please see a copy of my visit note below.    Urology Clinic    Wally Britt MD  Date of Service: 2019     Name: Mona Wagner  MRN: [id number]  Age: 28 year old  : 1990  Referring provider: Dorian Johnson     Assessment and Plan:  Assessment:  Mona Wagner is a 28 year old female with right hydronephrosis in her transplant kidney and possible ureteral stricture.     Plan:  We discussed the risk of damage to the transplant kidney with hydronephrosis.     We discussed treatment options including observation, stenting, incision of ureteral stricture, and ureteral reimplantation.  Possible complications were discussed including but not limited to heart attack, stroke, blood clots to the lungs, death, muscle injury, nerve injury, spleen injury, pancreas injury, intestine injury, injury to the lungs, hernias, the possibility of persistent flank pain, need for additional procedures, and the possibility of recurrent obstruction. We discussed that if incision of a ureteral stricture is performed, she may have recurrence and require ureteral reimplantation in the future with her transplant surgeon.     We will repeat renal ultrasound in 1-2 weeks. If this is stable or improved, we will continue with observation. If hydronephrosis is worse on ultrasound, we will proceed with cystoscopy, ureteroscopy, possible stent placement, and possible incision of ureteral stricture. We will tentatively schedule surgery today.     UA was sent today.     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 She presents for evaluation of hydronephrosis in the transplanted kidney.  She denies any pain in the region of the transplant or infection.  Her creatinine is somewhat increased from 10/14/19 when it was about 1.1.    She was making no urine prior to transplant but is now making a lot of urine. She is having irregular spotting.     Review of Systems:   Pertinent items are noted in HPI or as below, remainder of complete ROS is negative.      Active Medications:     Current Outpatient Medications:      niMODipine (NIMOTOP) 30 MG capsule, NIMODIPINE 30MG CAPSULES TK TWO CS PO Q 4 H, Disp: , Rfl:      acetaminophen (TYLENOL) 325 MG tablet, Take 2 tablets (650 mg) by mouth every 4 hours as needed for mild pain, Disp: 100 tablet, Rfl: 3     amLODIPine (NORVASC) 10 MG tablet, , Disp: , Rfl:      aspirin (ASA) 81 MG chewable tablet, Take 1 tablet (81 mg) by mouth daily, Disp: 30 tablet, Rfl: 5     atorvastatin (LIPITOR) 10 MG tablet, Take 1 tablet (10 mg) by mouth daily, Disp: 90 tablet, Rfl: 0     bumetanide (BUMEX) 2 MG tablet, , Disp: , Rfl:      cinacalcet (SENSIPAR) 30 MG tablet, Take 1 tablet (30 mg) by mouth daily, Disp: 30 tablet, Rfl: 2     diphenhydrAMINE (BENADRYL) 25 MG tablet, Take 1-2 tablets (25-50 mg) by mouth every 6 hours as needed for itching or allergies, Disp: 60 tablet, Rfl: 1     docusate sodium (COLACE) 100 MG capsule, Take 100 mg by mouth, Disp: , Rfl:      EPINEPHrine (EPIPEN/ADRENACLICK/OR ANY BX GENERIC EQUIV) 0.3 MG/0.3ML injection 2-pack, 0.3 mg, Disp: , Rfl:      fludrocortisone (FLORINEF) 0.1 MG tablet, Take 0.1 mg by mouth three times a week , Disp: 180 tablet, Rfl: 3     hydrOXYzine (ATARAX) 10 MG tablet, Take 1 tablet (10 mg) by mouth 3 times daily as needed for anxiety, Disp: 20 tablet, Rfl: 0     lactobacillus rhamnosus, GG, (CULTURELL) capsule, Take 1 capsule by mouth  2 times daily, Disp: , Rfl:      levothyroxine (SYNTHROID/LEVOTHROID) 25 MCG tablet, Take 1 tablet (25 mcg) Tuesday, Thursday, Saturday and Sunday and 1.5 tablets (37.5 mcg) on Monday, Wednesday and Friday every week., Disp: 105 tablet, Rfl: 1     magnesium oxide (MAG-OX) 400 MG tablet, Take 1 tablet (400 mg) by mouth 2 times daily, Disp: 180 tablet, Rfl: 0     multivitamin RENAL (NEPHROCAPS/TRIPHROCAPS) 1 MG capsule, Take 1 capsule by mouth daily, Disp: 30 capsule, Rfl: 2     mycophenolate (GENERIC EQUIVALENT) 250 MG capsule, , Disp: , Rfl:      MYFORTIC (BRAND) 180 MG EC tablet, Take 3 tablets (540 mg) by mouth 2 times daily, Disp: 180 tablet, Rfl: 11     norethindrone (MICRONOR) 0.35 MG tablet, Do not restart until your follow up appointment with your surgeon. Discuss restart date at that appointment., Disp: , Rfl:      ondansetron (ZOFRAN ODT) 4 MG ODT tab, Take 1-2 tablets (4-8 mg) by mouth every 8 hours as needed for nausea, Disp: 60 tablet, Rfl: 1     oxyCODONE (ROXICODONE) 5 MG tablet, , Disp: , Rfl:      pentamidine (NEBUPENT) 300 MG neb solution, Inhale 300 mg into the lungs every 28 days, Disp: , Rfl:      polyethylene glycol (MIRALAX/GLYCOLAX) packet, Take 17 g by mouth, Disp: , Rfl:      psyllium (METAMUCIL/KONSYL) capsule, Take 1 capsule by mouth daily, Disp: 90 capsule, Rfl: 3     RENVELA 800 MG tablet, , Disp: , Rfl:      simethicone (MYLICON) 125 MG chewable tablet, Take 1 tablet (125 mg) by mouth 4 times daily as needed for intestinal gas, Disp: , Rfl:      sodium bicarbonate 650 MG tablet, Take 3 tablets (1,950 mg) by mouth 2 times daily, Disp: 180 tablet, Rfl: 2     SPS 15 GM/60ML suspension, , Disp: , Rfl:      tacrolimus (GENERIC EQUIVALENT) 0.5 MG capsule, HOLD (Patient not taking: Reported on 11/19/2019), Disp: 60 capsule, Rfl: 11     tacrolimus (GENERIC EQUIVALENT) 1 MG capsule, Total dose = 7 mg BID, Disp: 420 capsule, Rfl: 11     tacrolimus (GENERIC EQUIVALENT) 5 MG capsule, Take 1 capsule  (5 mg) by mouth 2 times daily Total dose = 7 mg BID, Disp: 60 capsule, Rfl: 11     valGANciclovir (VALCYTE) 450 MG tablet, , Disp: , Rfl:       Allergies:   Chlorhexidine; Sulfa drugs; Dapsone; Furosemide; and Lisinopril      Past Medical History:  DVT of upper extremity   Seizure disorder   Hypertension, kidney transplant related  Galactorrhea  Hypothyroidism   Anemia in chronic kidney disease  Increased prolactin level   Hypomagnesemia  Normocytic anemia  Thrombocytopenia   Infective endocarditis  Immunosuppression   Methemoglobinemia  Kidney replaced by transplant  Anxiety  Secondary renal hyperparathyroidism   Vitamin D deficiency  End stage renal disease on dialysis   Pituitary adenoma     Past Surgical History:  Bench kidney 08/03/2019   Left wrist arteriovenous fistula placement 1/2/2014   Right kidney biopsy 09/17/2019   Dialysis catheter 12/10/2013   Transplant kidney with ureteral stent placement 08/02/2019     Family History:   Sister: hypertension, diabetes, cerebrovascular disease   Mother: kidney disease   Sister: kidney disease   Father: cerebrovascular disease     Social History:   No tobacco use.   No alcohol use.      Physical Exam:   /84   Pulse 87   Ht 1.524 m (5')   Wt 57.6 kg (127 lb)   BMI 24.80 kg/m      Body mass index is 24.8 kg/m .  General: Alert, no acute distress, oriented  HENT: Good dentition  Lungs: No respiratory distress, or pursed lip breathing  Heart: No obvious jugular venous distension present  Abdomen: Soft, no organomegaly or masses. Right lower quadrant tenderness over the kidney transplant.   Musculoskeletal: Extremities normal, no peripheral edema  Skin: No suspicious lesions or rashes  Neuro: Alert, oriented, speech and mentation normal  Psych: Affect and mood normal  Gait: Normal  : deferred    Imaging:   I have personally reviewed the results of the below imaging studies. The results were discussed with the patient.     Results for orders placed or performed  during the hospital encounter of 11/13/19   NM Renogram    Narrative    EXAMINATION: NM RENOGRAM  11/13/2019 1:17 PM      CLINICAL HISTORY: Renal transplant dysfunction; Elevated serum  creatinine; Kidney replaced by transplant on August 3, 2019. Ureteral  stent was removed on 9/16/2019.    COMPARISON: Ultrasound 11/1/2019        PROCEDURE COMMENTS: After radiopharmaceutical injection, 20 minutes of  dynamic images were acquired.  The diuretic was not given secondary to  patient's allergic reaction.   Radiopharmaceutical: 10 mci tc-mag 3 inj. rac. Tc-99m-MAG3  Route: Intravenous    FINDINGS:  There is mild heterogeneous uptake of the radiopharmaceutical by the  parenchyma of the right lower quadrant renal transplant, especially in  the lower pole.     There is prominent renal calyces and pelvis with no substantial  washout at the end of 30 minutes. IV administration of furosemide was  not administered secondary to patient's allergic reaction. There is  adequate filling of the urinary bladder.      Impression    IMPRESSION:  Moderate hydronephrosis in the right lower quadrant renal transplant  with marked delayed washout favors at least partial outflow  obstruction. Acute tubular necrosis is a possibility but less likely.     I have personally reviewed the examination and initial interpretation  and I agree with the findings.    ART FERMIN MD     Laboratory:   I personally reviewed all applicable laboratory data and went over findings with patient  Significant for:    CBC RESULTS:  Recent Labs   Lab Test 11/18/19  0845 11/11/19  0840 11/04/19  0850 10/31/19  0830   WBC 4.2 3.4* 4.0 3.4*   HGB 10.3* 10.7* 10.8* 10.1*    243 225 224     BMP RESULTS:  Recent Labs   Lab Test 11/18/19  0845 11/11/19  0840 11/04/19  0850 10/31/19  0830    139 138 138   POTASSIUM 4.5 4.6 4.7 4.4   CHLORIDE 111* 111* 110* 109   CO2 23 22 22 23   ANIONGAP 5 6 6 7   GLC 92 95 92 92   BUN 32* 36* 33* 27   CR 1.33* 1.33*  1.33* 1.25*   GFRESTIMATED 55* 55* 55* 59*   GFRESTBLACK 63 63 63 68   SHAMIR 9.7 9.3 9.8 9.5     UA RESULTS:   Recent Labs   Lab Test 11/01/19  1148 09/16/19  0950 08/30/19  2316   SG 1.010 1.003 1.013   URINEPH 8.0* 6.0 5.0   NITRITE Negative Negative Negative   RBCU <1 4* 92*   WBCU <1 1 6*       Scribe Disclosure:  I, Yahaira Lia, am serving as a scribe to document services personally performed by Wally Britt MD at this visit, based upon the provider's statements to me. All documentation has been reviewed by the aforementioned provider prior to being entered into the official medical record.     Melba Lia served as the scribe for this patient's visit and documented my history and physical exam.  I performed the history and physical exam.  I have edited and agree with the note.  MARCUS Britt MD        Answers for HPI/ROS submitted by the patient on 11/19/2019   General Symptoms: No  Skin Symptoms: No  HENT Symptoms: Yes  EYE SYMPTOMS: No  HEART SYMPTOMS: No  LUNG SYMPTOMS: Yes  INTESTINAL SYMPTOMS: Yes  URINARY SYMPTOMS: No  GYNECOLOGIC SYMPTOMS: Yes  BREAST SYMPTOMS: No  SKELETAL SYMPTOMS: Yes  BLOOD SYMPTOMS: Yes  NERVOUS SYSTEM SYMPTOMS: No  MENTAL HEALTH SYMPTOMS: No  Ear pain: Yes  Ear discharge: No  Mouth sores: Yes  Gum tenderness: Yes  Bleeding gums: Yes  Cough: Yes  Sputum or phlegm: Yes  Bloating: Yes  Diarrhea: Yes  Blood in stool: Yes  Muscle aches: Yes  Neck pain: Yes  Anemia: Yes  Bleeding or spotting between periods: Yes  Irregular periods: Yes  Reduced libido: Yes

## 2019-11-22 NOTE — TELEPHONE ENCOUNTER
Patient is scheduled for surgery with Dr. Britt      Spoke or left message with: Mona    Date of Surgery: 12/6/19    Location: [facility] OR    Informed patient they will need an adult  yes    Pre-op with surgeon (if applicable): n/a    H&P: Scheduled with PAC 11/29/19    Additional imaging/appointments: n/c    Surgery packet: mailed 11/25/19     Additional comments: n/a

## 2019-11-22 NOTE — PROGRESS NOTES
Urology Clinic    Walyl Britt MD  Date of Service: 2019     Name: Mona Wagner  MRN: [id number]  Age: 28 year old  : 1990  Referring provider: Dorian Johnson     Assessment and Plan:  Assessment:  Mona Wagner is a 28 year old female with right hydronephrosis in her transplant kidney and possible ureteral stricture.     Plan:  We discussed the risk of damage to the transplant kidney with hydronephrosis.     We discussed treatment options including observation, stenting, incision of ureteral stricture, and ureteral reimplantation.  Possible complications were discussed including but not limited to heart attack, stroke, blood clots to the lungs, death, muscle injury, nerve injury, spleen injury, pancreas injury, intestine injury, injury to the lungs, hernias, the possibility of persistent flank pain, need for additional procedures, and the possibility of recurrent obstruction. We discussed that if incision of a ureteral stricture is performed, she may have recurrence and require ureteral reimplantation in the future with her transplant surgeon.     We will repeat renal ultrasound in 1-2 weeks. If this is stable or improved, we will continue with observation. If hydronephrosis is worse on ultrasound, we will proceed with cystoscopy, ureteroscopy, possible stent placement, and possible incision of ureteral stricture. We will tentatively schedule surgery today.     UA was sent today.     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 She presents for evaluation of hydronephrosis in the transplanted kidney.  She denies any pain in the region of the transplant or infection.  Her creatinine is somewhat increased from 10/14/19  when it was about 1.1.    She was making no urine prior to transplant but is now making a lot of urine. She is having irregular spotting.     Review of Systems:   Pertinent items are noted in HPI or as below, remainder of complete ROS is negative.      Active Medications:     Current Outpatient Medications:      niMODipine (NIMOTOP) 30 MG capsule, NIMODIPINE 30MG CAPSULES TK TWO CS PO Q 4 H, Disp: , Rfl:      acetaminophen (TYLENOL) 325 MG tablet, Take 2 tablets (650 mg) by mouth every 4 hours as needed for mild pain, Disp: 100 tablet, Rfl: 3     amLODIPine (NORVASC) 10 MG tablet, , Disp: , Rfl:      aspirin (ASA) 81 MG chewable tablet, Take 1 tablet (81 mg) by mouth daily, Disp: 30 tablet, Rfl: 5     atorvastatin (LIPITOR) 10 MG tablet, Take 1 tablet (10 mg) by mouth daily, Disp: 90 tablet, Rfl: 0     bumetanide (BUMEX) 2 MG tablet, , Disp: , Rfl:      cinacalcet (SENSIPAR) 30 MG tablet, Take 1 tablet (30 mg) by mouth daily, Disp: 30 tablet, Rfl: 2     diphenhydrAMINE (BENADRYL) 25 MG tablet, Take 1-2 tablets (25-50 mg) by mouth every 6 hours as needed for itching or allergies, Disp: 60 tablet, Rfl: 1     docusate sodium (COLACE) 100 MG capsule, Take 100 mg by mouth, Disp: , Rfl:      EPINEPHrine (EPIPEN/ADRENACLICK/OR ANY BX GENERIC EQUIV) 0.3 MG/0.3ML injection 2-pack, 0.3 mg, Disp: , Rfl:      fludrocortisone (FLORINEF) 0.1 MG tablet, Take 0.1 mg by mouth three times a week , Disp: 180 tablet, Rfl: 3     hydrOXYzine (ATARAX) 10 MG tablet, Take 1 tablet (10 mg) by mouth 3 times daily as needed for anxiety, Disp: 20 tablet, Rfl: 0     lactobacillus rhamnosus, GG, (CULTURELL) capsule, Take 1 capsule by mouth 2 times daily, Disp: , Rfl:      levothyroxine (SYNTHROID/LEVOTHROID) 25 MCG tablet, Take 1 tablet (25 mcg) Tuesday, Thursday, Saturday and Sunday and 1.5 tablets (37.5 mcg) on Monday, Wednesday and Friday every week., Disp: 105 tablet, Rfl: 1     magnesium oxide (MAG-OX) 400 MG tablet, Take 1 tablet (400 mg)  by mouth 2 times daily, Disp: 180 tablet, Rfl: 0     multivitamin RENAL (NEPHROCAPS/TRIPHROCAPS) 1 MG capsule, Take 1 capsule by mouth daily, Disp: 30 capsule, Rfl: 2     mycophenolate (GENERIC EQUIVALENT) 250 MG capsule, , Disp: , Rfl:      MYFORTIC (BRAND) 180 MG EC tablet, Take 3 tablets (540 mg) by mouth 2 times daily, Disp: 180 tablet, Rfl: 11     norethindrone (MICRONOR) 0.35 MG tablet, Do not restart until your follow up appointment with your surgeon. Discuss restart date at that appointment., Disp: , Rfl:      ondansetron (ZOFRAN ODT) 4 MG ODT tab, Take 1-2 tablets (4-8 mg) by mouth every 8 hours as needed for nausea, Disp: 60 tablet, Rfl: 1     oxyCODONE (ROXICODONE) 5 MG tablet, , Disp: , Rfl:      pentamidine (NEBUPENT) 300 MG neb solution, Inhale 300 mg into the lungs every 28 days, Disp: , Rfl:      polyethylene glycol (MIRALAX/GLYCOLAX) packet, Take 17 g by mouth, Disp: , Rfl:      psyllium (METAMUCIL/KONSYL) capsule, Take 1 capsule by mouth daily, Disp: 90 capsule, Rfl: 3     RENVELA 800 MG tablet, , Disp: , Rfl:      simethicone (MYLICON) 125 MG chewable tablet, Take 1 tablet (125 mg) by mouth 4 times daily as needed for intestinal gas, Disp: , Rfl:      sodium bicarbonate 650 MG tablet, Take 3 tablets (1,950 mg) by mouth 2 times daily, Disp: 180 tablet, Rfl: 2     SPS 15 GM/60ML suspension, , Disp: , Rfl:      tacrolimus (GENERIC EQUIVALENT) 0.5 MG capsule, HOLD (Patient not taking: Reported on 11/19/2019), Disp: 60 capsule, Rfl: 11     tacrolimus (GENERIC EQUIVALENT) 1 MG capsule, Total dose = 7 mg BID, Disp: 420 capsule, Rfl: 11     tacrolimus (GENERIC EQUIVALENT) 5 MG capsule, Take 1 capsule (5 mg) by mouth 2 times daily Total dose = 7 mg BID, Disp: 60 capsule, Rfl: 11     valGANciclovir (VALCYTE) 450 MG tablet, , Disp: , Rfl:       Allergies:   Chlorhexidine; Sulfa drugs; Dapsone; Furosemide; and Lisinopril      Past Medical History:  DVT of upper extremity   Seizure disorder   Hypertension,  kidney transplant related  Galactorrhea  Hypothyroidism   Anemia in chronic kidney disease  Increased prolactin level   Hypomagnesemia  Normocytic anemia  Thrombocytopenia   Infective endocarditis  Immunosuppression   Methemoglobinemia  Kidney replaced by transplant  Anxiety  Secondary renal hyperparathyroidism   Vitamin D deficiency  End stage renal disease on dialysis   Pituitary adenoma     Past Surgical History:  Bench kidney 08/03/2019   Left wrist arteriovenous fistula placement 1/2/2014   Right kidney biopsy 09/17/2019   Dialysis catheter 12/10/2013   Transplant kidney with ureteral stent placement 08/02/2019     Family History:   Sister: hypertension, diabetes, cerebrovascular disease   Mother: kidney disease   Sister: kidney disease   Father: cerebrovascular disease     Social History:   No tobacco use.   No alcohol use.      Physical Exam:   /84   Pulse 87   Ht 1.524 m (5')   Wt 57.6 kg (127 lb)   BMI 24.80 kg/m     Body mass index is 24.8 kg/m .  General: Alert, no acute distress, oriented  HENT: Good dentition  Lungs: No respiratory distress, or pursed lip breathing  Heart: No obvious jugular venous distension present  Abdomen: Soft, no organomegaly or masses. Right lower quadrant tenderness over the kidney transplant.   Musculoskeletal: Extremities normal, no peripheral edema  Skin: No suspicious lesions or rashes  Neuro: Alert, oriented, speech and mentation normal  Psych: Affect and mood normal  Gait: Normal  : deferred    Imaging:   I have personally reviewed the results of the below imaging studies. The results were discussed with the patient.     Results for orders placed or performed during the hospital encounter of 11/13/19   NM Renogram    Narrative    EXAMINATION: NM RENOGRAM  11/13/2019 1:17 PM      CLINICAL HISTORY: Renal transplant dysfunction; Elevated serum  creatinine; Kidney replaced by transplant on August 3, 2019. Ureteral  stent was removed on 9/16/2019.    COMPARISON:  Ultrasound 11/1/2019        PROCEDURE COMMENTS: After radiopharmaceutical injection, 20 minutes of  dynamic images were acquired.  The diuretic was not given secondary to  patient's allergic reaction.   Radiopharmaceutical: 10 mci tc-mag 3 inj. rac. Tc-99m-MAG3  Route: Intravenous    FINDINGS:  There is mild heterogeneous uptake of the radiopharmaceutical by the  parenchyma of the right lower quadrant renal transplant, especially in  the lower pole.     There is prominent renal calyces and pelvis with no substantial  washout at the end of 30 minutes. IV administration of furosemide was  not administered secondary to patient's allergic reaction. There is  adequate filling of the urinary bladder.      Impression    IMPRESSION:  Moderate hydronephrosis in the right lower quadrant renal transplant  with marked delayed washout favors at least partial outflow  obstruction. Acute tubular necrosis is a possibility but less likely.     I have personally reviewed the examination and initial interpretation  and I agree with the findings.    ART FERMIN MD     Laboratory:   I personally reviewed all applicable laboratory data and went over findings with patient  Significant for:    CBC RESULTS:  Recent Labs   Lab Test 11/18/19  0845 11/11/19  0840 11/04/19  0850 10/31/19  0830   WBC 4.2 3.4* 4.0 3.4*   HGB 10.3* 10.7* 10.8* 10.1*    243 225 224     BMP RESULTS:  Recent Labs   Lab Test 11/18/19  0845 11/11/19  0840 11/04/19  0850 10/31/19  0830    139 138 138   POTASSIUM 4.5 4.6 4.7 4.4   CHLORIDE 111* 111* 110* 109   CO2 23 22 22 23   ANIONGAP 5 6 6 7   GLC 92 95 92 92   BUN 32* 36* 33* 27   CR 1.33* 1.33* 1.33* 1.25*   GFRESTIMATED 55* 55* 55* 59*   GFRESTBLACK 63 63 63 68   SHAMIR 9.7 9.3 9.8 9.5     UA RESULTS:   Recent Labs   Lab Test 11/01/19  1148 09/16/19  0950 08/30/19  2316   SG 1.010 1.003 1.013   URINEPH 8.0* 6.0 5.0   NITRITE Negative Negative Negative   RBCU <1 4* 92*   WBCU <1 1 6*       Scribe  Disclosure:  I, Yahaira Fitzgerald, am serving as a scribe to document services personally performed by Wally Britt MD at this visit, based upon the provider's statements to me. All documentation has been reviewed by the aforementioned provider prior to being entered into the official medical record.     Melba Fitzgerald served as the scribe for this patient's visit and documented my history and physical exam.  I performed the history and physical exam.  I have edited and agree with the note.  MARCUS Britt MD        Answers for HPI/ROS submitted by the patient on 11/19/2019   General Symptoms: No  Skin Symptoms: No  HENT Symptoms: Yes  EYE SYMPTOMS: No  HEART SYMPTOMS: No  LUNG SYMPTOMS: Yes  INTESTINAL SYMPTOMS: Yes  URINARY SYMPTOMS: No  GYNECOLOGIC SYMPTOMS: Yes  BREAST SYMPTOMS: No  SKELETAL SYMPTOMS: Yes  BLOOD SYMPTOMS: Yes  NERVOUS SYSTEM SYMPTOMS: No  MENTAL HEALTH SYMPTOMS: No  Ear pain: Yes  Ear discharge: No  Mouth sores: Yes  Gum tenderness: Yes  Bleeding gums: Yes  Cough: Yes  Sputum or phlegm: Yes  Bloating: Yes  Diarrhea: Yes  Blood in stool: Yes  Muscle aches: Yes  Neck pain: Yes  Anemia: Yes  Bleeding or spotting between periods: Yes  Irregular periods: Yes  Reduced libido: Yes

## 2019-11-25 ENCOUNTER — TELEPHONE (OUTPATIENT)
Dept: PHARMACY | Facility: CLINIC | Age: 29
End: 2019-11-25

## 2019-11-25 DIAGNOSIS — Z94.0 KIDNEY REPLACED BY TRANSPLANT: ICD-10-CM

## 2019-11-25 DIAGNOSIS — Z94.0 KIDNEY REPLACED BY TRANSPLANT: Primary | ICD-10-CM

## 2019-11-25 DIAGNOSIS — Z79.899 LONG TERM USE OF DRUG: ICD-10-CM

## 2019-11-25 LAB
ANION GAP SERPL CALCULATED.3IONS-SCNC: 7 MMOL/L (ref 3–14)
BUN SERPL-MCNC: 31 MG/DL (ref 7–30)
CALCIUM SERPL-MCNC: 9.9 MG/DL (ref 8.5–10.1)
CHLORIDE SERPL-SCNC: 111 MMOL/L (ref 94–109)
CO2 SERPL-SCNC: 21 MMOL/L (ref 20–32)
CREAT SERPL-MCNC: 1.3 MG/DL (ref 0.52–1.04)
ERYTHROCYTE [DISTWIDTH] IN BLOOD BY AUTOMATED COUNT: 11.8 % (ref 10–15)
GFR SERPL CREATININE-BSD FRML MDRD: 56 ML/MIN/{1.73_M2}
GLUCOSE SERPL-MCNC: 95 MG/DL (ref 70–99)
HCT VFR BLD AUTO: 33.1 % (ref 35–47)
HGB BLD-MCNC: 10.4 G/DL (ref 11.7–15.7)
MCH RBC QN AUTO: 29.8 PG (ref 26.5–33)
MCHC RBC AUTO-ENTMCNC: 31.4 G/DL (ref 31.5–36.5)
MCV RBC AUTO: 95 FL (ref 78–100)
PLATELET # BLD AUTO: 203 10E9/L (ref 150–450)
POTASSIUM SERPL-SCNC: 4.4 MMOL/L (ref 3.4–5.3)
RBC # BLD AUTO: 3.49 10E12/L (ref 3.8–5.2)
SODIUM SERPL-SCNC: 139 MMOL/L (ref 133–144)
TACROLIMUS BLD-MCNC: 10.3 UG/L (ref 5–15)
TME LAST DOSE: NORMAL H
WBC # BLD AUTO: 4.8 10E9/L (ref 4–11)

## 2019-11-25 PROCEDURE — 80197 ASSAY OF TACROLIMUS: CPT | Performed by: INTERNAL MEDICINE

## 2019-11-25 PROCEDURE — 80048 BASIC METABOLIC PNL TOTAL CA: CPT | Performed by: INTERNAL MEDICINE

## 2019-11-25 PROCEDURE — 85027 COMPLETE CBC AUTOMATED: CPT | Performed by: INTERNAL MEDICINE

## 2019-11-25 NOTE — TELEPHONE ENCOUNTER
Anemia Management Note  SUBJECTIVE/OBJECTIVE:  Referred by Dr. Nakul Hill on 2019  Primary Diagnosis: Anemia in Chronic Kidney Disease (N18.3, D63.1)     Secondary Diagnosis:  Chronic Kidney Disease, Stage 3 (N18.3)   Kidney Tx: 2019  Hgb goal range:  9-10  Epo/Darbo: Aranesp  40 mcg  every two weeks for Hgb <10.  In clinic.   Iron regimen:  NA  Labs : 2020  Recent CHARLINE use, transfusion, IV iron: PO Iron and Aranesp   RX/TX plans :2020  No history of stroke, MI and blood clots or cancers  Contact:            Ok to leave message  per consent to communicate dated 05/15/2019                              OK to speak with Jt Aleman () per consent to communicate dated 05/15/2013    Anemia Latest Ref Rng & Units 10/21/2019 10/28/2019 10/31/2019 2019 2019 2019 2019   CHARLINE Dose - - - - - - - -   Hemoglobin 11.7 - 15.7 g/dL 9.7(L) 10.5(L) 10.1(L) 10.8(L) 10.7(L) 10.3(L) 10.4(L)   TSAT 15 - 46 % - - - - - - -   Ferritin 12 - 150 ng/mL - - - - - - -     BP Readings from Last 3 Encounters:   19 124/84   19 130/85   19 132/87     Wt Readings from Last 2 Encounters:   19 57.6 kg (127 lb)   19 57.7 kg (127 lb 1.6 oz)           ASSESSMENT:  Hgb:Above goal - recommend hold dose  TSat: Due for labs Ferritin: Due for labs    PLAN:  Hold Aranesp and RTC for hgb then aranesp if needed in 2 week(s)    Orders needed to be renewed (for next follow-up date) in EPIC: None    Iron labs due:  DUE    Plan discussed with:  RUTHY Dunn  Plan provided by:  solomon    NEXT FOLLOW-UP DATE:  12/10/2019    Manjula Mckinnon RN   Anemia Services  [facility]  [address] [place] [id number]   [email or web address]   Office : [phone]  Fax: [phone]

## 2019-11-25 NOTE — TELEPHONE ENCOUNTER
FUTURE VISIT INFORMATION      SURGERY INFORMATION:    Date: 19    Location: [facility] OR    Surgeon:  Wally Britt    Anesthesia Type:  General    RECORDS REQUESTED FROM:       Primary Care Provider: Macarena Romero    Pertinent Medical History: hypertension, chronic kidney disease    Most recent EKG+ Tracin19    Most recent ECHO: 19

## 2019-11-27 ENCOUNTER — TELEPHONE (OUTPATIENT)
Dept: TRANSPLANT | Facility: CLINIC | Age: 29
End: 2019-11-27

## 2019-11-27 NOTE — TELEPHONE ENCOUNTER
Post Kidney and Pancreas Transplant Team Conference  Date: 11/27/2019  Transplant Coordinator: Laurence Vazquez     Attendees:  []  Dr. Hill  [x] Leonela Brewster LPN     []  Dr. Wilson [] Cinthia Cox RN [] Kiersten Chang LPN   []  Dr. Rios [] Gely Lynn, RN     [] Keeley Li RN [x] Anthony Hendricks, PharmD   [] Dr. Johnson [x] Melani Vazquez, ERMELINDA    [] Dr. Durbin [] Virgilio Ryan RN    [] Dr. Pinto [] Maria Teresa Mayberry RN    [x] Dr. Delgadillo [] Yesika Wong RN     [] Melanie Ayala RN    [] Surgery Fellow [x] Nancy Maldonado RN    [] Johana Linda, NP [] Nathalia Moreno RN        Verbal Plan Read Back:   Check with patient to make sure ultrasound is scheduled  Check to see if patient stopped Valcyte      Routed to RN Coordinator   Leonela Brewster LPN    RNCC confirmed via Epic - US is scheduled on 12/3, cysto with urology is scheduled for 12/20.

## 2019-11-29 ENCOUNTER — ANESTHESIA EVENT (OUTPATIENT)
Dept: SURGERY | Facility: AMBULATORY SURGERY CENTER | Age: 29
End: 2019-11-29

## 2019-11-29 ENCOUNTER — OFFICE VISIT (OUTPATIENT)
Dept: SURGERY | Facility: CLINIC | Age: 29
End: 2019-11-29
Payer: MEDICARE

## 2019-11-29 ENCOUNTER — PRE VISIT (OUTPATIENT)
Dept: SURGERY | Facility: CLINIC | Age: 29
End: 2019-11-29

## 2019-11-29 VITALS
BODY MASS INDEX: 25.32 KG/M2 | HEART RATE: 80 BPM | RESPIRATION RATE: 16 BRPM | OXYGEN SATURATION: 98 % | SYSTOLIC BLOOD PRESSURE: 137 MMHG | DIASTOLIC BLOOD PRESSURE: 93 MMHG | WEIGHT: 129 LBS | HEIGHT: 60 IN | TEMPERATURE: 97.8 F

## 2019-11-29 DIAGNOSIS — Z01.818 PRE-OP EVALUATION: Primary | ICD-10-CM

## 2019-11-29 DIAGNOSIS — N13.5 STRICTURE OR KINKING OF URETER: ICD-10-CM

## 2019-11-29 ASSESSMENT — MIFFLIN-ST. JEOR: SCORE: 1241.64

## 2019-11-29 ASSESSMENT — LIFESTYLE VARIABLES: TOBACCO_USE: 0

## 2019-11-29 NOTE — PATIENT INSTRUCTIONS
Preparing for Your Surgery      Name:  Mona Wagner   MRN:  [id number]   :  1990   Today's Date:  2019     Arriving for surgery:  Surgery date:  19  Arrival time:  10:00 AM  Please come to:     [facility]  [address] [place] [id number]     Parking is available in front of the Clinics and Surgery Center building from 5:30AM to 8:00PM.  -  Proceed to the 5th floor to check into the Ambulatory Surgery Center.              >> There will be patient concierges on the 1st and 5th floor, for assistance or an escort, if you would like.              >> Please call [phone] with any questions.    What can I eat or drink?  -  You may have solid food or milk products until 8 hours prior to your surgery.  -  You may have water, apple juice or 7up/Sprite until 2 hours prior to your surgery.    Which medicines can I take?  Hold Ibuprofen for 24 hours and/or Naproxen for 48 hours prior to surgery.     -  Please take these medications the day of surgery:  Tylenol if needed; take all scheduled medications normally taken in the morning.    How do I prepare myself?  -  Take two showers: one the night before surgery; and one the morning of surgery.         Use Scrubcare or Hibiclens to wash from neck down, leave soap on your skin for up to one minute.  Do not get soap in your eyes or ears.  You may use your own shampoo and conditioner; no other hair products.   -  Do NOT use lotion, powder, deodorant, or antiperspirant the day of your surgery.  -  Do NOT wear any makeup, fingernail polish or jewelry.  -  Bring your ID and insurance card.    -If you are scheduled to go home the Same Day as surgery you must have a responsible adult as a  and to stay with you overnight the first 24 hours after surgery.     Questions or Concerns:  -If you are scheduled on the [facility] or [facility] campus and have questions or concerns regarding the day of surgery, please call [facility] at [phone].     -If you are scheduled at the Ambulatory Surgery Center and have questions or concerns regarding the day of surgery please call [phone].    -For questions after surgery please call your surgeons office.

## 2019-11-29 NOTE — ANESTHESIA PREPROCEDURE EVALUATION
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 Induction with thymo 6mg/kg.     Pituitary adenoma (H)       Past Surgical History:   Procedure Laterality Date     BENCH KIDNEY N/A 8/3/2019    Procedure: BACKBENCH PREPARATION, ALLOGRAFT, KIDNEY;  Surgeon: Dorian Johnson MD;  Location: [facility] OR     CREATE FISTULA ARTERIOVENOUS UPPER EXTREMITY  2014    Procedure: CREATE FISTULA ARTERIOVENOUS UPPER EXTREMITY;  Left Wrist Arteriovenous Fistula Placement;  Surgeon: Shashi Castro MD;  Location: [facility] OR     PERCUTANEOUS BIOPSY KIDNEY Right 2019    Procedure: Right Kidney Biopsy;  Surgeon: Deny Rios MD;  Location: [facility]     RASTA/DIALYSIS CATHETER  12/10/2013          TRANSPLANT KIDNEY RECIPIENT  DONOR N/A 8/3/2019    Procedure: TRANSPLANT, KIDNEY, RECIPIENT,  DONOR with Ureteral Stent Placement;  Surgeon: Dorian Johnson MD;  Location: [facility] OR          Anesthesia Evaluation     . Pt has had prior anesthetic. Type: General    No history of anesthetic complications          ROS/MED HX    ENT/Pulmonary:  - neg pulmonary ROS    (-) tobacco use   Neurologic:     (+)other neuro seizure with SAH in . no long term deficits. No longer using Keppra    Cardiovascular: Comment: H/o bacterial endocarditis 2019    (+) Dyslipidemia, ----. Taking blood thinners : Instructions Given to  patient: ASA 81 mg. . . :. . Previous cardiac testing Echodate:8/16/19results:Interpretation Summary  Global and regional left ventricular function is normal with an EF of 55-60%.  Right ventricular function, chamber size, wall motion, and thickness are  normal.  All four valves are normal in structure and function.  Trivial pericardial effusion is present.     This study was compared with the study from 7/9/2019 .  There has been no change.  date: results:ECG reviewed date:8/9/19 results:Sinus tachycardia Late transition  Possible Left atrial enlargement  Right axis deviation  Abnormal ECG  When compared with ECG of 07-AUG-2019 16:21,  QT has lengthened   date: results:          METS/Exercise Tolerance:  >4 METS   Hematologic:     (+) History of blood clots pt is not anticoagulated, Anemia, History of Transfusion no previous transfusion reaction -      Musculoskeletal:  - neg musculoskeletal ROS       GI/Hepatic:  - neg GI/hepatic ROS       Renal/Genitourinary:     (+) chronic renal disease, type: ESRD, Pt does not require dialysis, Pt has history of transplant, date: 8/3/19, Other Renal/ Genitourinary, hydronephrosis of transplanted kidney      Endo:     (+) thyroid problem hypothyroidism, Chronic steroid usage for Post Transplant Immunosuppression .      Psychiatric:     (+) psychiatric history anxiety      Infectious Disease:  - neg infectious disease ROS       Malignancy:      - no malignancy   Other:    (+) no H/O Chronic Pain,                       PHYSICAL EXAM:   Mental Status/Neuro: A/A/O   Airway: Facies: Feasible  Mallampati: III  Mouth/Opening: Full  TM distance: > 6 cm  Neck ROM: Full   Respiratory: Auscultation: CTAB     Resp. Rate: Normal     Resp. Effort: Normal      CV: Rhythm: Regular  Heart: Murmur (Systolic; soft)  Edema: None   Comments:      Dental: Normal Dentition                LABS:  CBC:   Lab Results   Component Value Date    WBC 4.8 11/25/2019    WBC 4.2 11/18/2019    HGB 10.4 (L)  11/25/2019    HGB 10.3 (L) 11/18/2019    HCT 33.1 (L) 11/25/2019    HCT 33.0 (L) 11/18/2019     11/25/2019     11/18/2019     BMP:   Lab Results   Component Value Date     11/25/2019     11/18/2019    POTASSIUM 4.4 11/25/2019    POTASSIUM 4.5 11/18/2019    CHLORIDE 111 (H) 11/25/2019    CHLORIDE 111 (H) 11/18/2019    CO2 21 11/25/2019    CO2 23 11/18/2019    BUN 31 (H) 11/25/2019    BUN 32 (H) 11/18/2019    CR 1.30 (H) 11/25/2019    CR 1.33 (H) 11/18/2019    GLC 95 11/25/2019    GLC 92 11/18/2019     COAGS:   Lab Results   Component Value Date    PTT 33 08/02/2019    INR 0.95 08/30/2019    FIBR 311 02/21/2017     POC:   Lab Results   Component Value Date     (H) 08/06/2019    HCG negative 12/31/2013    HCGS Negative 08/30/2019     OTHER:   Lab Results   Component Value Date    LACT 0.5 (L) 08/09/2019    A1C 4.8 08/03/2019    SHAMIR 9.9 11/25/2019    PHOS 2.2 (L) 11/19/2019    MAG 1.6 11/19/2019    ALBUMIN 3.9 08/30/2019    PROTTOTAL 7.2 08/30/2019    ALT 16 08/30/2019    AST <3 08/30/2019    ALKPHOS 167 (H) 08/30/2019    BILITOTAL 0.3 08/30/2019    BILIDIRECT 0.2 12/03/2013    TSH 2.12 11/19/2019    T4 1.15 11/19/2019    T3 97 08/21/2019    CRP 11.2 (H) 01/15/2019     (H) 01/15/2019        Preop Vitals    BP Readings from Last 3 Encounters:   11/22/19 124/84   11/19/19 130/85   11/04/19 132/87    Pulse Readings from Last 3 Encounters:   11/22/19 87   11/19/19 79   11/04/19 87      Resp Readings from Last 3 Encounters:   09/17/19 14   08/30/19 20   08/26/19 16    SpO2 Readings from Last 3 Encounters:   11/04/19 99%   10/07/19 100%   09/20/19 99%      Temp Readings from Last 1 Encounters:   10/07/19 98.3  F (36.8  C)    Ht Readings from Last 1 Encounters:   11/22/19 1.524 m (5')      Wt Readings from Last 1 Encounters:   11/22/19 57.6 kg (127 lb)    Estimated body mass index is 24.8 kg/m  as calculated from the following:    Height as of 11/22/19: 1.524 m (5').    Weight as of  11/22/19: 57.6 kg (127 lb).     LDA:  Hemodialysis Vascular Access Arteriovenous fistula Left Forearm (Active)   Site Assessment WDL;Bruit present;Thrill present 8/15/2019 12:00 PM   Cannulation Needle Size 15 8/15/2019 12:00 PM   Dressing Intervention New dressing 8/15/2019  2:29 PM   Dressing Status Clean, dry, intact 8/15/2019  2:29 PM   Verification by x-ray Not applicable 8/7/2019  8:35 PM   Hand Off Report Yes 8/9/2019  5:19 PM   Number of days:         Assessment:   ASA SCORE: 3    H&P: History and physical reviewed and following examination; no interval change.   Smoking Status:  Non-Smoker/Unknown   NPO Status: NPO Appropriate     Plan:   Anes. Type:  General   Pre-Medication: Acetaminophen   Induction:  IV (Standard)   Airway: LMA   Access/Monitoring: PIV   Maintenance: Balanced     Postop Plan:   Postop Pain: Opioids  Postop Sedation/Airway: Not planned  Disposition: Outpatient     PONV Management:   Adult Risk Factors: Female, Non-Smoker, Postop Opioids   Prevention: Ondansetron, Dexamethasone, Propofol     CONSENT: Direct conversation   Plan and risks discussed with: Patient   Blood Products: Consent Deferred (Minimal Blood Loss)                PAC Discussion and Assessment    ASA Classification: 3  Case is suitable for: ASC  Anesthetic techniques and relevant risks discussed: GA  Invasive monitoring and risk discussed:   Types:   Possibility and Risk of blood transfusion discussed:   NPO instructions given:   Additional anesthetic preparation and risks discussed:   Needs early admission to pre-op area:   Other:     PAC Resident/NP Anesthesia Assessment:  Mona Wagner is a 29 year old female scheduled for CYSTOURETEROSCOPY, WITH RETROGRADE PYELOGRAM, HOLMIUM LASER incision of stricture in transplant kidney AND STENT INSERTION on 12/6/19 by Dr. Britt in treatment of stricture or kinking of ureter.  PAC referral for risk assessment and optimization for anesthesia with comorbid conditions of ESRD s/p  kidney transplant 8/3/19,  dyslipidemia, h/o DVT (2014), h/o SAH and seizure (2014), anemia of chronic disease, hypothyroid:    Pre-operative considerations:  1.  Cardiac:  Functional status- METS >4. H/o endocarditis 2/2019.  Dyslipidemia taking lipitor (continue DOS). H/o bacterial endocarditis 2/2019. ECHO 8/16/19 was normal- EF 55-60%, no wall motion abnormalities, no valve abnormalities. She is currently using ASA 81 mg post kidney transplant- she will continue.  Message sent to Dr. Britt to see if he is comfortable proceeding with the procedure while she is using ASA, awaiting response.  low risk surgery with 0.9% (RCRI #) risk of major adverse cardiac event.   2.  Pulm:  Airway feasible.  FATOUMATA risk: low. No pulmonary history.   3.  GI:  Risk of PONV score = 3.  If > 2, anti-emetic intervention recommended. Continue bowel regimen as needed.   4.  Heme: h/o UE DVT in 2014. Not currently anticoagulated. H/o anemia. hgb 10.4 on 11/25/19.  5. Neuro: h/o SAH presenting with seizure while anticoagulated for DVT in 2014. Coumadin stopped. She was followed by neurology and keppra was slowly tapered. No recurring  symptoms since that time.   6. 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 She now has hydronephrosis of transplanted kidney secondary to stricture. Above procedure planned. She currently uses myfortic, tacrolimus and flurocortisone for anti rejection medications.  Cr 1.3, GFR 56.   7. Access: difficult IV access  8. Endo: hypothyroid taking levothyroxine    VTE risk:1.8%    Patient is optimized and is acceptable candidate for the proposed procedure.  No further diagnostic evaluation is needed.     Patient discussed with Dr. Coulter    **For further details of assessment, testing, and physical exam please see H and P completed on same date.      Melvina Boykin PA-C        Mid-Level Provider/Resident:   Date:   Time:     Attending Anesthesiologist Anesthesia  Assessment:        Anesthesiologist:   Date:   Time:   Pass/Fail:   Disposition:     PAC Pharmacist Assessment:        Pharmacist:   Date:   Time:    Melvina Boykin PA-C

## 2019-11-29 NOTE — H&P
Pre-Operative H & P     CC:  Preoperative exam to assess for increased cardiopulmonary risk while undergoing surgery and anesthesia.    Date of Encounter: 11/29/2019  Primary Care Physician:  Macarena Bowen  Associated diagnosis: stricture or kinking of ureter    HPI  Mona Wagner is a 29 year old female who presents for pre-operative H & P in preparation for CYSTOURETEROSCOPY, WITH RETROGRADE PYELOGRAM, HOLMIUM LASER incision of stricture in transplant kidney AND STENT INSERTION with Dr. Britt on 12/5/19 at [facility]. Patient is being evaluated for comorbid conditions of ESRD s/p kidney transplant 8/3/19,  dyslipidemia, h/o DVT (2014), h/o SAH and seizure (2014), anemia of chronic disease, hypothyroid     Ms. 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  She has current hydronephrosis of the transplanted kidney.  Today she reports feeling well overall, but her RLQ has been sore. She has been evaluated by Urology and discussed possible treatments. Above procedure planned.     History is obtained from the patient and chart review.      Past Medical History  Past Medical History:   Diagnosis Date     Anemia in chronic kidney disease      Dialysis patient (H)      End stage renal disease on dialysis (H)      History of DVT (deep vein thrombosis) 2014     History of seizure 2014     Hypertension      Hypothyroid      Kidney transplanted 08/03/2019    DCD DDKT. Induction with thymo 6mg/kg.     Pituitary adenoma (H)        Past Surgical History  Past Surgical History:   Procedure Laterality Date     BENCH KIDNEY N/A 8/3/2019    Procedure: BACKBENCH PREPARATION, ALLOGRAFT, KIDNEY;  Surgeon: Dorian Jonhson MD;  Location: [facility] OR     CREATE FISTULA ARTERIOVENOUS UPPER EXTREMITY  1/2/2014    Procedure: CREATE FISTULA ARTERIOVENOUS UPPER EXTREMITY;  Left Wrist Arteriovenous Fistula Placement;  Surgeon: Shashi Castro MD;  Location: [facility] OR      PERCUTANEOUS BIOPSY KIDNEY Right 2019    Procedure: Right Kidney Biopsy;  Surgeon: Deny Rios MD;  Location: [facility] OR     RASTA/DIALYSIS CATHETER  12/10/2013          TRANSPLANT KIDNEY RECIPIENT  DONOR N/A 8/3/2019    Procedure: TRANSPLANT, KIDNEY, RECIPIENT,  DONOR with Ureteral Stent Placement;  Surgeon: Dorian Johnson MD;  Location: [facility] OR       Hx of Blood transfusions/reactions: Patient has a history of transfusion, but denies reactions     Hx of abnormal bleeding or anti-platelet use: currently using ASA 81 mg, will continue    Menstrual history: No LMP recorded. (Menstrual status: Irregular Periods).    Steroid use in the last year: daily 0.1 mg fludrocortisone M,W,F    Personal or FH with difficulty with Anesthesia:  denies    Prior to Admission Medications  Current Outpatient Medications   Medication Sig Dispense Refill     acetaminophen (TYLENOL) 325 MG tablet Take 2 tablets (650 mg) by mouth every 4 hours as needed for mild pain 100 tablet 3     aspirin (ASA) 81 MG chewable tablet Take 1 tablet (81 mg) by mouth daily 30 tablet 5     atorvastatin (LIPITOR) 10 MG tablet Take 1 tablet (10 mg) by mouth daily 90 tablet 0     cinacalcet (SENSIPAR) 30 MG tablet Take 1 tablet (30 mg) by mouth daily (Patient taking differently: Take 30 mg by mouth every evening ) 30 tablet 2     diphenhydrAMINE (BENADRYL) 25 MG tablet Take 1-2 tablets (25-50 mg) by mouth every 6 hours as needed for itching or allergies 60 tablet 1     docusate sodium (COLACE) 100 MG capsule Take 100 mg by mouth       fludrocortisone (FLORINEF) 0.1 MG tablet Take 0.1 mg by mouth three times a week  180 tablet 3     hydrOXYzine (ATARAX) 10 MG tablet Take 1 tablet (10 mg) by mouth 3 times daily as needed for anxiety 20 tablet 0     levothyroxine (SYNTHROID/LEVOTHROID) 25 MCG tablet Take 1 tablet (25 mcg) Tuesday, Thursday, Saturday and  and 1.5 tablets (37.5 mcg) on Monday, Wednesday and Friday every week. 105  tablet 1     magnesium oxide (MAG-OX) 400 MG tablet Take 1 tablet (400 mg) by mouth 2 times daily 180 tablet 0     multivitamin RENAL (NEPHROCAPS/TRIPHROCAPS) 1 MG capsule Take 1 capsule by mouth daily 30 capsule 2     MYFORTIC (BRAND) 180 MG EC tablet Take 3 tablets (540 mg) by mouth 2 times daily 180 tablet 11     norethindrone (MICRONOR) 0.35 MG tablet Do not restart until your follow up appointment with your surgeon. Discuss restart date at that appointment.       pentamidine (NEBUPENT) 300 MG neb solution Inhale 300 mg into the lungs every 28 days       polyethylene glycol (MIRALAX/GLYCOLAX) packet Take 17 g by mouth       psyllium (METAMUCIL/KONSYL) capsule Take 1 capsule by mouth daily 90 capsule 3     simethicone (MYLICON) 125 MG chewable tablet Take 1 tablet (125 mg) by mouth 4 times daily as needed for intestinal gas       sodium bicarbonate 650 MG tablet Take 3 tablets (1,950 mg) by mouth 2 times daily 180 tablet 2     tacrolimus (GENERIC EQUIVALENT) 1 MG capsule Total dose = 7 mg  capsule 11     tacrolimus (GENERIC EQUIVALENT) 5 MG capsule Take 1 capsule (5 mg) by mouth 2 times daily Total dose = 7 mg BID 60 capsule 11     EPINEPHrine (EPIPEN/ADRENACLICK/OR ANY BX GENERIC EQUIV) 0.3 MG/0.3ML injection 2-pack 0.3 mg       ondansetron (ZOFRAN ODT) 4 MG ODT tab Take 1-2 tablets (4-8 mg) by mouth every 8 hours as needed for nausea 60 tablet 1     tacrolimus (GENERIC EQUIVALENT) 0.5 MG capsule HOLD (Patient not taking: Reported on 11/19/2019) 60 capsule 11       Allergies  Allergies   Allergen Reactions     Chlorhexidine Rash     Rash at site     Sulfa Drugs Rash     Muscle stiffness of neck     Dapsone      Methemoglobinemia     Furosemide Other (See Comments)     Skin flushing     Lisinopril Swelling     angioedema       Social History  Social History     Socioeconomic History     Marital status:      Spouse name: Not on file     Number of children: Not on file     Years of education: Not on  file     Highest education level: Not on file   Occupational History     Not on file   Social Needs     Financial resource strain: Not on file     Food insecurity:     Worry: Not on file     Inability: Not on file     Transportation needs:     Medical: Not on file     Non-medical: Not on file   Tobacco Use     Smoking status: Never Smoker     Smokeless tobacco: Never Used   Substance and Sexual Activity     Alcohol use: No     Alcohol/week: 0.0 standard drinks     Drug use: No     Sexual activity: Yes     Partners: Male   Lifestyle     Physical activity:     Days per week: Not on file     Minutes per session: Not on file     Stress: Not on file   Relationships     Social connections:     Talks on phone: Not on file     Gets together: Not on file     Attends Quaker service: Not on file     Active member of club or organization: Not on file     Attends meetings of clubs or organizations: Not on file     Relationship status: Not on file     Intimate partner violence:     Fear of current or ex partner: Not on file     Emotionally abused: Not on file     Physically abused: Not on file     Forced sexual activity: Not on file   Other Topics Concern     Parent/sibling w/ CABG, MI or angioplasty before 65F 55M? Not Asked   Social History Narrative    Date of Service:5/15/2013     Name: Mona VALDOVINOS (Month, Day, Year of birth): 1990     MRN: [id number]     New Patient: Yes    Preferred Language: English     Needed: No    County of Residence: [place]    Marital Status:     Household size: 8    Number of Dependents:0     Pregnant: 0    Average Monthly Income: $ 600    Citizenship Status: Citizen    Gave Pt [employer] and [employer] applications       Family History  Family History   Problem Relation Age of Onset     Hypertension Sister      Diabetes Sister      Cerebrovascular Disease Sister      Kidney Disease Mother      Kidney Disease Sister      Cerebrovascular Disease Father      Coronary Artery  Disease No family hx of      Breast Cancer No family hx of      Cancer - colorectal No family hx of      Ovarian Cancer No family hx of      Prostate Cancer No family hx of      Other Cancer No family hx of      Asthma No family hx of      Anesthesia Reaction No family hx of      Deep Vein Thrombosis (DVT) No family hx of          ROS/MED HX    ENT/Pulmonary:  - neg pulmonary ROS    (-) tobacco use   Neurologic:     (+)other neuro seizure with SAH in 2014. no long term deficits. No longer using Keppra    Cardiovascular: Comment: H/o bacterial endocarditis 2/2019    (+) Dyslipidemia, ----. Taking blood thinners : Instructions Given to patient: ASA 81 mg. . . :. . Previous cardiac testing Echodate:8/16/19results:Interpretation Summary  Global and regional left ventricular function is normal with an EF of 55-60%.  Right ventricular function, chamber size, wall motion, and thickness are  normal.  All four valves are normal in structure and function.  Trivial pericardial effusion is present.     This study was compared with the study from 7/9/2019 .  There has been no change.  date: results:ECG reviewed date:8/9/19 results:Sinus tachycardia Late transition  Possible Left atrial enlargement  Right axis deviation  Abnormal ECG  When compared with ECG of 07-AUG-2019 16:21,  QT has lengthened   date: results:          METS/Exercise Tolerance:  >4 METS   Hematologic:     (+) History of blood clots pt is not anticoagulated, Anemia, History of Transfusion no previous transfusion reaction -      Musculoskeletal:  - neg musculoskeletal ROS       GI/Hepatic:  - neg GI/hepatic ROS       Renal/Genitourinary:     (+) chronic renal disease, type: ESRD, Pt does not require dialysis, Pt has history of transplant, date: 8/3/19, Other Renal/ Genitourinary, hydronephrosis of transplanted kidney      Endo:     (+) thyroid problem hypothyroidism, Chronic steroid usage for Post Transplant Immunosuppression .      Psychiatric:     (+)  "psychiatric history anxiety      Infectious Disease:  - neg infectious disease ROS       Malignancy:      - no malignancy   Other:    (+) no H/O Chronic Pain,       The complete review of systems is negative other than noted in the HPI or here.   Temp: 97.8  F (36.6  C) Temp src: Oral BP: (!) 137/93 Pulse: 80   Resp: 16 SpO2: 98 %         129 lbs 0 oz  5' 0\"   Body mass index is 25.19 kg/m .       Physical Exam  Constitutional: Awake, alert, cooperative, no apparent distress, and appears stated age. Presents with   Eyes: Pupils equal, round and reactive to light, extra ocular muscles intact, sclera clear, conjunctiva normal.  HENT: Normocephalic, oral pharynx with moist mucus membranes, good dentition. No goiter appreciated.   Respiratory: Clear to auscultation bilaterally, no crackles or wheezing.  Cardiovascular: Regular rate and rhythm, soft systolic murmur.  Carotids +2, no bruits. No edema. Palpable pulses to R radial artery. L AV fistula present   GI: Normal bowel sounds, soft, non-distended. Slightly tender R LQ. Abdominal scars well hawa  Lymph/Hematologic: No cervical lymphadenopathy and no supraclavicular lymphadenopathy.  Genitourinary:  deferred  Skin: Warm and dry.  No rashes at anticipated surgical site.   Musculoskeletal: Full ROM of neck. There is no redness, warmth, or swelling of the exposed joints. Gross motor strength is normal.    Neurologic: Awake, alert, oriented to name, place and time. Cranial nerves II-XII are grossly intact. Gait is normal.   Neuropsychiatric: Calm, cooperative. Normal affect.     Labs: (personally reviewed)  Component      Latest Ref Rng & Units 11/19/2019 11/22/2019 11/25/2019   Color Urine        Yellow    Appearance Urine        Clear    Glucose Urine      NEG:Negative mg/dL  Negative    Bilirubin Urine      NEG:Negative  Negative    Ketones Urine      NEG:Negative mg/dL  Negative    Specific Gravity Urine      1.003 - 1.035  1.012    Blood Urine      " NEG:Negative  Small (A)    pH Urine      5.0 - 7.0 pH  7.0    Protein Albumin Urine      NEG:Negative mg/dL  Negative    Urobilinogen mg/dL      0.0 - 2.0 mg/dL  0.0    Nitrite Urine      NEG:Negative  Negative    Leukocyte Esterase Urine      NEG:Negative  Negative    Source        Midstream Urine    WBC Urine      0 - 5 /HPF  1    RBC Urine      0 - 2 /HPF  <1    Bacteria Urine      NEG:Negative /HPF  Few (A)    Squamous Epithelial /HPF Urine      0 - 1 /HPF  1    Sodium      133 - 144 mmol/L   139   Potassium      3.4 - 5.3 mmol/L   4.4   Chloride      94 - 109 mmol/L   111 (H)   Carbon Dioxide      20 - 32 mmol/L   21   Anion Gap      3 - 14 mmol/L   7   Glucose      70 - 99 mg/dL   95   Urea Nitrogen      7 - 30 mg/dL   31 (H)   Creatinine      0.52 - 1.04 mg/dL   1.30 (H)   GFR Estimate      >60 mL/min/1.73:m2   56 (L)   GFR Estimate If Black      >60 mL/min/1.73:m2   65   Calcium      8.5 - 10.1 mg/dL   9.9   WBC      4.0 - 11.0 10e9/L   4.8   RBC Count      3.8 - 5.2 10e12/L   3.49 (L)   Hemoglobin      11.7 - 15.7 g/dL   10.4 (L)   Hematocrit      35.0 - 47.0 %   33.1 (L)   MCV      78 - 100 fl   95   MCH      26.5 - 33.0 pg   29.8   MCHC      31.5 - 36.5 g/dL   31.4 (L)   RDW      10.0 - 15.0 %   11.8   Platelet Count      150 - 450 10e9/L   203   T4 Free      0.76 - 1.46 ng/dL 1.15       ECHO 8/16/19  Interpretation Summary  Global and regional left ventricular function is normal with an EF of 55-60%.  Right ventricular function, chamber size, wall motion, and thickness are  normal.  All four valves are normal in structure and function.  Trivial pericardial effusion is present.     This study was compared with the study from 7/9/2019 .  There has been no change.     EKG 8/9/19  Sinus tachycardia Late transition  Possible Left atrial enlargement  Right axis deviation  Abnormal ECG  When compared with ECG of 07-AUG-2019 16:21,  QT has lengthened    Outside records reviewed from: care  everywhere    ASSESSMENT and PLAN  Mona Wagner is a 29 year old female scheduled for CYSTOURETEROSCOPY, WITH RETROGRADE PYELOGRAM, HOLMIUM LASER incision of stricture in transplant kidney AND STENT INSERTION on 12/6/19 by Dr. Britt in treatment of stricture or kinking of ureter.  PAC referral for risk assessment and optimization for anesthesia with comorbid conditions of ESRD s/p kidney transplant 8/3/19,  dyslipidemia, h/o DVT (2014), h/o SAH and seizure (2014), anemia of chronic disease, hypothyroid:    Pre-operative considerations:  1.  Cardiac:  Functional status- METS >4. H/o endocarditis 2/2019.  Dyslipidemia taking lipitor (continue DOS). H/o bacterial endocarditis 2/2019. ECHO 8/16/19 was normal- EF 55-60%, no wall motion abnormalities, no valve abnormalities. She is currently using ASA 81 mg post kidney transplant- she will continue.  Message sent to Dr. Britt to see if he is comfortable proceeding with the procedure while she is using ASA, awaiting response.  low risk surgery with 0.9% (RCRI #) risk of major adverse cardiac event.   2.  Pulm:  Airway feasible.  FATOUMATA risk: low. No pulmonary history.   3.  GI:  Risk of PONV score = 3.  If > 2, anti-emetic intervention recommended. Continue bowel regimen as needed.   4.  Heme: h/o UE DVT in 2014. Not currently anticoagulated. H/o anemia. hgb 10.4 on 11/25/19.  5. Neuro: h/o SAH presenting with seizure while anticoagulated for DVT in 2014. Coumadin stopped. She was followed by neurology and keppra was slowly tapered. No recurring  symptoms since that time.   6. 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 She now has hydronephrosis of transplanted kidney secondary to stricture. Above procedure planned. She currently uses myfortic, tacrolimus and flurocortisone for anti rejection medications.  Cr 1.3, GFR 56.   7. Access: difficult IV access  8. Endo: hypothyroid taking levothyroxine    VTE risk:1.8%    Patient is optimized and is  acceptable candidate for the proposed procedure.  No further diagnostic evaluation is needed.     Patient discussed with BOB GallardoC  Preoperative Assessment Center  [facility]  Phone: [phone]  Fax: [phone]

## 2019-12-02 DIAGNOSIS — Z94.0 KIDNEY REPLACED BY TRANSPLANT: Primary | ICD-10-CM

## 2019-12-02 DIAGNOSIS — Z94.0 KIDNEY REPLACED BY TRANSPLANT: ICD-10-CM

## 2019-12-02 DIAGNOSIS — Z79.899 LONG TERM USE OF DRUG: ICD-10-CM

## 2019-12-02 LAB
ANION GAP SERPL CALCULATED.3IONS-SCNC: 7 MMOL/L (ref 3–14)
BUN SERPL-MCNC: 30 MG/DL (ref 7–30)
CALCIUM SERPL-MCNC: 9.8 MG/DL (ref 8.5–10.1)
CHLORIDE SERPL-SCNC: 112 MMOL/L (ref 94–109)
CO2 SERPL-SCNC: 21 MMOL/L (ref 20–32)
CREAT SERPL-MCNC: 1.22 MG/DL (ref 0.52–1.04)
ERYTHROCYTE [DISTWIDTH] IN BLOOD BY AUTOMATED COUNT: 11.7 % (ref 10–15)
GFR SERPL CREATININE-BSD FRML MDRD: 61 ML/MIN/{1.73_M2}
GLUCOSE SERPL-MCNC: 93 MG/DL (ref 70–99)
HCT VFR BLD AUTO: 33.6 % (ref 35–47)
HGB BLD-MCNC: 10.6 G/DL (ref 11.7–15.7)
MAGNESIUM SERPL-MCNC: 1.6 MG/DL (ref 1.6–2.3)
MCH RBC QN AUTO: 29.5 PG (ref 26.5–33)
MCHC RBC AUTO-ENTMCNC: 31.5 G/DL (ref 31.5–36.5)
MCV RBC AUTO: 94 FL (ref 78–100)
PHOSPHATE SERPL-MCNC: 2 MG/DL (ref 2.5–4.5)
PLATELET # BLD AUTO: 188 10E9/L (ref 150–450)
POTASSIUM SERPL-SCNC: 3.9 MMOL/L (ref 3.4–5.3)
RBC # BLD AUTO: 3.59 10E12/L (ref 3.8–5.2)
SODIUM SERPL-SCNC: 140 MMOL/L (ref 133–144)
TACROLIMUS BLD-MCNC: 9.7 UG/L (ref 5–15)
TME LAST DOSE: NORMAL H
WBC # BLD AUTO: 4.1 10E9/L (ref 4–11)

## 2019-12-02 PROCEDURE — 80048 BASIC METABOLIC PNL TOTAL CA: CPT | Performed by: INTERNAL MEDICINE

## 2019-12-02 PROCEDURE — 85027 COMPLETE CBC AUTOMATED: CPT | Performed by: INTERNAL MEDICINE

## 2019-12-02 PROCEDURE — 87799 DETECT AGENT NOS DNA QUANT: CPT | Performed by: INTERNAL MEDICINE

## 2019-12-02 PROCEDURE — 84100 ASSAY OF PHOSPHORUS: CPT | Performed by: TRANSPLANT SURGERY

## 2019-12-02 PROCEDURE — 83735 ASSAY OF MAGNESIUM: CPT | Performed by: TRANSPLANT SURGERY

## 2019-12-02 PROCEDURE — 80197 ASSAY OF TACROLIMUS: CPT | Performed by: INTERNAL MEDICINE

## 2019-12-03 ENCOUNTER — ALLIED HEALTH/NURSE VISIT (OUTPATIENT)
Dept: TRANSPLANT | Facility: CLINIC | Age: 29
End: 2019-12-03
Attending: INTERNAL MEDICINE
Payer: MEDICARE

## 2019-12-03 ENCOUNTER — ANCILLARY PROCEDURE (OUTPATIENT)
Dept: ULTRASOUND IMAGING | Facility: CLINIC | Age: 29
End: 2019-12-03
Attending: UROLOGY
Payer: MEDICARE

## 2019-12-03 ENCOUNTER — RESULTS ONLY (OUTPATIENT)
Dept: OTHER | Facility: CLINIC | Age: 29
End: 2019-12-03

## 2019-12-03 ENCOUNTER — OFFICE VISIT (OUTPATIENT)
Dept: NEPHROLOGY | Facility: CLINIC | Age: 29
End: 2019-12-03
Attending: INTERNAL MEDICINE
Payer: MEDICARE

## 2019-12-03 ENCOUNTER — OFFICE VISIT (OUTPATIENT)
Dept: PHARMACY | Facility: CLINIC | Age: 29
End: 2019-12-03
Payer: MEDICAID

## 2019-12-03 VITALS
TEMPERATURE: 98 F | BODY MASS INDEX: 25.02 KG/M2 | DIASTOLIC BLOOD PRESSURE: 90 MMHG | HEART RATE: 74 BPM | SYSTOLIC BLOOD PRESSURE: 136 MMHG | WEIGHT: 128.1 LBS | OXYGEN SATURATION: 100 %

## 2019-12-03 DIAGNOSIS — E21.3 HYPERPARATHYROIDISM (H): ICD-10-CM

## 2019-12-03 DIAGNOSIS — D63.1 ANEMIA IN STAGE 3 CHRONIC KIDNEY DISEASE (H): ICD-10-CM

## 2019-12-03 DIAGNOSIS — N25.81 SECONDARY RENAL HYPERPARATHYROIDISM (H): ICD-10-CM

## 2019-12-03 DIAGNOSIS — N28.89 OBSTRUCTION OF KIDNEY: ICD-10-CM

## 2019-12-03 DIAGNOSIS — E55.9 VITAMIN D DEFICIENCY: ICD-10-CM

## 2019-12-03 DIAGNOSIS — D63.1 ANEMIA OF CHRONIC RENAL FAILURE, STAGE 3 (MODERATE) (H): ICD-10-CM

## 2019-12-03 DIAGNOSIS — N18.30 ANEMIA IN STAGE 3 CHRONIC KIDNEY DISEASE (H): ICD-10-CM

## 2019-12-03 DIAGNOSIS — N18.30 CKD (CHRONIC KIDNEY DISEASE) STAGE 3, GFR 30-59 ML/MIN (H): ICD-10-CM

## 2019-12-03 DIAGNOSIS — Z94.0 KIDNEY TRANSPLANTED: Primary | ICD-10-CM

## 2019-12-03 DIAGNOSIS — I95.9 HYPOTENSION, UNSPECIFIED HYPOTENSION TYPE: ICD-10-CM

## 2019-12-03 DIAGNOSIS — E78.5 HYPERLIPIDEMIA LDL GOAL <100: ICD-10-CM

## 2019-12-03 DIAGNOSIS — R52 PAIN: ICD-10-CM

## 2019-12-03 DIAGNOSIS — Z94.0 KIDNEY REPLACED BY TRANSPLANT: ICD-10-CM

## 2019-12-03 DIAGNOSIS — R19.7 DIARRHEA, UNSPECIFIED TYPE: ICD-10-CM

## 2019-12-03 DIAGNOSIS — D84.9 IMMUNOSUPPRESSION (H): ICD-10-CM

## 2019-12-03 DIAGNOSIS — Z48.298 AFTERCARE FOLLOWING ORGAN TRANSPLANT: Primary | ICD-10-CM

## 2019-12-03 DIAGNOSIS — Z94.0 KIDNEY REPLACED BY TRANSPLANT: Primary | ICD-10-CM

## 2019-12-03 DIAGNOSIS — E83.42 HYPOMAGNESEMIA: ICD-10-CM

## 2019-12-03 DIAGNOSIS — Z79.899 LONG TERM USE OF DRUG: ICD-10-CM

## 2019-12-03 DIAGNOSIS — Z78.9 USES BIRTH CONTROL: ICD-10-CM

## 2019-12-03 DIAGNOSIS — N18.30 ANEMIA OF CHRONIC RENAL FAILURE, STAGE 3 (MODERATE) (H): ICD-10-CM

## 2019-12-03 PROBLEM — N18.6 END STAGE RENAL DISEASE (H): Status: RESOLVED | Noted: 2019-11-22 | Resolved: 2019-12-03

## 2019-12-03 LAB
ALBUMIN UR-MCNC: NEGATIVE MG/DL
ANION GAP SERPL CALCULATED.3IONS-SCNC: 4 MMOL/L (ref 3–14)
APPEARANCE UR: CLEAR
BILIRUB UR QL STRIP: NEGATIVE
BUN SERPL-MCNC: 29 MG/DL (ref 7–30)
CALCIUM SERPL-MCNC: 9.6 MG/DL (ref 8.5–10.1)
CHLORIDE SERPL-SCNC: 108 MMOL/L (ref 94–109)
CO2 SERPL-SCNC: 27 MMOL/L (ref 20–32)
COLOR UR AUTO: YELLOW
CREAT SERPL-MCNC: 1.33 MG/DL (ref 0.52–1.04)
CREAT UR-MCNC: 52 MG/DL
ERYTHROCYTE [DISTWIDTH] IN BLOOD BY AUTOMATED COUNT: 11.6 % (ref 10–15)
FERRITIN SERPL-MCNC: 1003 NG/ML (ref 12–150)
GFR SERPL CREATININE-BSD FRML MDRD: 55 ML/MIN/{1.73_M2}
GLUCOSE SERPL-MCNC: 79 MG/DL (ref 70–99)
GLUCOSE UR STRIP-MCNC: NEGATIVE MG/DL
HCT VFR BLD AUTO: 32 % (ref 35–47)
HGB BLD-MCNC: 9.9 G/DL (ref 11.7–15.7)
HGB UR QL STRIP: ABNORMAL
IRON SATN MFR SERPL: 23 % (ref 15–46)
IRON SERPL-MCNC: 47 UG/DL (ref 35–180)
KETONES UR STRIP-MCNC: NEGATIVE MG/DL
LEUKOCYTE ESTERASE UR QL STRIP: NEGATIVE
MCH RBC QN AUTO: 29.6 PG (ref 26.5–33)
MCHC RBC AUTO-ENTMCNC: 30.9 G/DL (ref 31.5–36.5)
MCV RBC AUTO: 96 FL (ref 78–100)
NITRATE UR QL: NEGATIVE
PH UR STRIP: 8 PH (ref 5–7)
PLATELET # BLD AUTO: 162 10E9/L (ref 150–450)
POTASSIUM SERPL-SCNC: 3.6 MMOL/L (ref 3.4–5.3)
PROT UR-MCNC: 0.08 G/L
PROT/CREAT 24H UR: 0.15 G/G CR (ref 0–0.2)
PTH-INTACT SERPL-MCNC: 210 PG/ML (ref 18–80)
RBC # BLD AUTO: 3.35 10E12/L (ref 3.8–5.2)
RBC #/AREA URNS AUTO: 1 /HPF (ref 0–2)
SODIUM SERPL-SCNC: 139 MMOL/L (ref 133–144)
SOURCE: ABNORMAL
SP GR UR STRIP: 1.01 (ref 1–1.03)
SQUAMOUS #/AREA URNS AUTO: <1 /HPF (ref 0–1)
TIBC SERPL-MCNC: 200 UG/DL (ref 240–430)
UROBILINOGEN UR STRIP-MCNC: 0 MG/DL (ref 0–2)
WBC # BLD AUTO: 4.3 10E9/L (ref 4–11)
WBC #/AREA URNS AUTO: 1 /HPF (ref 0–5)

## 2019-12-03 PROCEDURE — 40001063 ZZHCL STATISTIC ALLOSURE: Performed by: INTERNAL MEDICINE

## 2019-12-03 PROCEDURE — 80048 BASIC METABOLIC PNL TOTAL CA: CPT | Performed by: INTERNAL MEDICINE

## 2019-12-03 PROCEDURE — 86833 HLA CLASS II HIGH DEFIN QUAL: CPT | Performed by: INTERNAL MEDICINE

## 2019-12-03 PROCEDURE — 83550 IRON BINDING TEST: CPT | Performed by: INTERNAL MEDICINE

## 2019-12-03 PROCEDURE — 81001 URINALYSIS AUTO W/SCOPE: CPT | Performed by: INTERNAL MEDICINE

## 2019-12-03 PROCEDURE — 36415 COLL VENOUS BLD VENIPUNCTURE: CPT | Performed by: INTERNAL MEDICINE

## 2019-12-03 PROCEDURE — 83540 ASSAY OF IRON: CPT | Performed by: INTERNAL MEDICINE

## 2019-12-03 PROCEDURE — 87799 DETECT AGENT NOS DNA QUANT: CPT | Performed by: INTERNAL MEDICINE

## 2019-12-03 PROCEDURE — 82728 ASSAY OF FERRITIN: CPT | Performed by: INTERNAL MEDICINE

## 2019-12-03 PROCEDURE — 83970 ASSAY OF PARATHORMONE: CPT | Performed by: INTERNAL MEDICINE

## 2019-12-03 PROCEDURE — 85027 COMPLETE CBC AUTOMATED: CPT | Performed by: INTERNAL MEDICINE

## 2019-12-03 PROCEDURE — 99606 MTMS BY PHARM EST 15 MIN: CPT | Performed by: PHARMACIST

## 2019-12-03 PROCEDURE — 86832 HLA CLASS I HIGH DEFIN QUAL: CPT | Performed by: INTERNAL MEDICINE

## 2019-12-03 PROCEDURE — 84156 ASSAY OF PROTEIN URINE: CPT | Performed by: INTERNAL MEDICINE

## 2019-12-03 PROCEDURE — G0463 HOSPITAL OUTPT CLINIC VISIT: HCPCS | Mod: ZF

## 2019-12-03 PROCEDURE — 99607 MTMS BY PHARM ADDL 15 MIN: CPT | Performed by: PHARMACIST

## 2019-12-03 RX ORDER — FLUDROCORTISONE ACETATE 0.1 MG/1
0.1 TABLET ORAL
Qty: 180 TABLET | Refills: 3 | COMMUNITY
Start: 2019-12-05 | End: 2020-05-04

## 2019-12-03 ASSESSMENT — PAIN SCALES - GENERAL: PAINLEVEL: NO PAIN (0)

## 2019-12-03 NOTE — PROGRESS NOTES
nePost Transplant Patient Social Work Assessment -Outpatient    Patient Name: Mona Wagner  : 1990  Age: 29 year old  MRN: [id number]  Date of transplant: DD Kidney 8/3/19    Patient known to this writer from follow up in the transplant program. Seen today to update assessment.      Presenting Information   Living Situation: in [place], [place] with her , Jt, and his family.  Functional Status: Independent.  Cultural/Language/Spiritual Considerations: None idnicated at this time.    Support System  Primary Support Person Jt  Other support:  His parents    Health Care Directive  Decision Maker: Self  Alternate Decision Maker: Mona father as Jt and her are culturally .  Health Care Directive: Provided Copy and Provided education    Mental Health/Coping:   History of Mental Health: Mona indicated she continues to be on medication (PRN) for anxiety.  History of Chemical Health: No known history.  Current status: Appropriate  Coping:  Mona indicated when under stress she will talk to her  or her sister.  Services Needed/Recommended: None indicated at this time.    Financial   Income: Mona continues to be on disability and is employed eight hours a week.  Impact of transplant on income: Discussed Mona may no longer be eligible for disability after a review is completed which could happen one year after transplant.  Insurance and medication coverage: [place] and [place]  Financial concerns: None indicated at this time.  Resources needed: None indicated at this time.      Education provided by MARY: Social Work role outpatient setting and provided this writer's business card.    Assessment and recommendations and plan:  Discussed eligibility for disability, completing a Health Care Directive and also becoming pregnant post transplant.  Mona indicated understanding.  Encouraged her to contact this writer with any questions or concerns.

## 2019-12-03 NOTE — PROGRESS NOTES
SUBJECTIVE/OBJECTIVE:                Mona Wagner is a 28 year old female coming in for a follow up MTM visit.  She was referred post txp.    Chief Complaint: 4 months post txp.     Allergies/ADRs: Reviewed in Epic  Tobacco: No tobacco use   Alcohol: not currently using  Caffeine: no caffeine  PMH: Reviewed in Epic    Medication Adherence/Access:  Patient uses pill box(es) and uses reminders/alarms.  Patient takes medications 2 time(s) per day. 8:30am, 8:30pm  Per patient, misses medication 0 times per week.   Medication barriers: none.     Renal Transplant:  Current immunosuppressants include Tacrolimus 7mg BID, Myfortic 540mg BID.  Pt reports no side effects  Transplant date: 8/3/19  Estimated Creatinine Clearance: 55.4 mL/min (A) (based on SCr of 1.22 mg/dL (H)).  CMV prophylaxis: Completed  PCP prophylaxis: Inhaled Pentamidine 300mg q 4 weeks, November 19th. Allergic reaction to bactrim.   Current supplements for electrolyte replacement: magnesium oxide 400mg BID. Sodium Bicarb 650mg tabs, 3 tablets twice daily.   Magnesium   Date Value Ref Range Status   12/02/2019 1.6 1.6 - 2.3 mg/dL Final   Tx Coordinator: Hannah Polo MD: Dr. Horowitz, Using Med Card: Yes  Immunizations: annual flu shot 2019; [id number]:  2018, 2013; Prevnar 13: unknown; TDaP:  9/2009; Shingrix: unknown    Hyperparathyroidism: Pt is taking Sensipar 30mg daily.    Ref. Range 8/9/2019 05:59   Parathyroid Hormone Intact Latest Ref Range: 18 - 80 pg/mL 358 (H)     Hypotension/ Hyperkalemia: pt is taking Florinef 0.1mg three times per week. BPs at home: 127/80s, lowest: 119/75. Denies dizziness/ lightheadedness. Has gained about 10 lbs.     Pain: taking APAP once a week whenever she gets headaches. This is effective.     Diarrhea: Pt has off and on diarrhea, but overall pretty mild. She is using Metamucil and probiotics only on days she has diarrhea, about once weekly now. First stool is solid, second is a little loose around 11 am. She is not overly  concerned with this today.     Hyperlipidemia: Current therapy includes Atorvastatin 10mg once daily, Aspirin 81mg daily (denies bruising bleeding).  Pt reports no significant myalgias or other side effects.  The ASCVD Risk score (Rupa DC Jr., et al., 2013) failed to calculate for the following reasons:    The 2013 ASCVD risk score is only valid for ages 40 to 79    Birth Control post transplant: Pt is taking Micronor daily and using a barrier method.     Today's Vitals: There were no vitals taken for this visit.    ASSESSMENT:                 Current medications were reviewed today.    Medication Adherence: good, no issues identified    Renal Transplant:  Vaccinations due at 6 months post txp: Prevnar 13 and TDAP, prefer to separate as Prevnar 13 is known to be less effective when given with certain vaccines.     Hyperparathyroidism: Stable.     Hypotension/Hyperkalemia: BP well controlled, no dizziness lightheadedness, no noted hx of hyperkalemia, like can come off of this medication. Pt to discuss with txp team today. Pt believes this is being used for hypotension, chart describes it for hyperkalemia post txp surgery. Both are normalized currently.     Pain: Stable.     Diarrhea: Could consider switching to magnesium chloride, but patient not interested at this time.     Hyperlipidemia: Stable.    Birth Control post transplant: Stable.      PLAN:                Pt to...  1. Due at 6 months post txp: Prevnar 13 and TDAP.  2. Talk with txp team about trial off Florinef.     I spent 30 minutes with this patient today. I offer these suggestions for consideration by txp team. A copy of the visit note was provided to the patient's referring provider.    Will follow up as needed.    The patient was given a summary of these recommendations as an after visit summary.    Anthony Hendricks, PharmD  [facility] Pharmacist    Phone: [phone]

## 2019-12-03 NOTE — LETTER
12/3/2019       RE: Mona Wagner  [address]  [place] [address]     Dear Colleague,    Thank you for referring your patient, Mona Wagner, to the [facility] NEPHROLOGY at [facility]. Please see a copy of my visit note below.    ACUTE TRANSPLANT NEPHROLOGY VISIT    Assessment & Plan   # DDKT: Stable, but has noted moderate hydronephrosis on kidney transplant ultrasound.  Patient has been seen by Urology and has repeat kidney transplant ultrasound today.   - Baseline Cr ~ 1.1-1.3   - Proteinuria: Minimal (0.2-0.5 grams)   - Date DSA Last Checked: Sep/2019      Latest DSA: No   - BK Viremia: No   - Kidney Tx Biopsy: Sep 17, 2019; Result: No diagnostic evidence of acute rejection.    # Immunosuppression: Tacrolimus immediate release (goal 8-10) and Mycophenolic acid (goal 1.0-3.5)   - Changes: No    # Infection Prophylaxis:   - PJP: Inhaled pentamidine  - CMV: None, prophylaxis completed  - Thrush: None    # Blood Pressure: Borderline control;  Goal BP: > 100, but < 130 systolic   - Volume status: Euvolemic   - Changes: Yes - With higher BP, will decrease Florinef to 2x per week.  If patient's BP continues to be in the 120s sytolic, may taper off Florinef.    # Anemia in Chronic Renal Disease: Hgb: Stable      CHARLINE: Yes   - Iron studies: Replete    # Mineral Bone Disorder:   - Secondary renal hyperparathyroidism; PTH level: Moderately elevated (301-600 pg/ml) and will continue on cinacalcet  - Vitamin D; level: Low        On Supplement: No due to hypercalcemia  - Calcium; level: High normal      On Supplement: No  - Phosphorus; level: Low        On Supplement: No    # Electrolytes:   - Potassium; level: Normal        On Supplement: No  - Magnesium; level: Normal        On Supplement: Yes  - Bicarbonate; level: Normal        On Supplement: Yes    # Transplant Ureteral Stenosis: Patient has planned repeat of kidney transplant ultrasound to see if symptoms have improved after  inflammation has gone down.  She is being followed by Urology with planned cystoscopy and ureteral dilatation this week if hydronephrosis is still present.    # Medical Compliance: Yes    # Transplant History:  Etiology of Kidney Failure: IgA nephropathy  Tx: DDKT  Transplant: 8/3/2019 (Kidney)  Donor Type: Donation after Circulatory Death  Donor Class: Standard Criteria Donor  Crossmatch at time of Tx: negative  DSA at time of Tx: No  Significant changes in immunosuppression: None  CMV IgG Ab High Risk Discordance (D+/R-): No  EBV IgG Ab High Risk Discordance (D+/R-): No  Significant transplant-related complications: None    Transplant Office Phone Number: [phone]    Assessment and plan was discussed with the patient and she voiced her understanding and agreement.    Return visit: Return for as previously scheduled.    Nakul Hill MD    Chief Complaint   Ms. Wagner is a 29 year old here for routine follow up.     History of Present Illness    Ms. Wagner reports feeling good overall with some medical complaints.  Since last clinic visit, patient reports no hospitalizations or new medical complaints and has been doing well overall.  She has had issues with moderate hydronephrosis and was referred by Transplant Surgery to Urology for evaluation.  Patient is scheduled to have a repeat kidney transplant ultrasound today and depending on the findings, she may have cystoscopy and ureteral dilatation by Urology this Friday.    Her energy level is good and pretty much back to normal.  Patient is back to work and active, although only gets minimal exercise.  She reports when she tries to jog, patient will get increased pain over her allograft.  Denies any chest pain or shortness of breath with exertion.  Appetite is good and her weight is up ~ 10-15 lbs.  No nausea or vomiting.  She generally has a normal, formed bowel movement in the morning and then later in the day will have some loose stools.  No fever, sweats  or chills.  No leg swelling, but may get some puffiness if she is on her feet all day.    Recent Hospitalizations:  [x] No [] Yes    New Medical Issues: [] No [x] Yes Hydronephrosis   Decreased energy: [x] No [] Yes    Chest pain or SOB with exertion:  [x] No [] Yes    Appetite change or weight change: [] No [x] Yes Weight is up ~ 10-15 lbs   Nausea, vomiting or diarrhea:  [] No [x] Yes Occasional loose stools   Fever, sweats or chills: [x] No [] Yes    Leg swelling: [x] No [] Yes      Home BP: 120/80s    Review of Systems   A comprehensive review of systems was obtained and negative, except as noted in the HPI or PMH.    Problem List   Patient Active Problem List   Diagnosis     DVT of upper extremity (deep vein thrombosis) (H)     Seizure disorder (H)     Other general symptoms(780.99)     Galactorrhea     Acquired hypothyroidism     Anemia in chronic kidney disease     Increased prolactin level (H)     Hypomagnesemia     Normocytic anemia     Thrombocytopenia (H)     Infective endocarditis     Aftercare following organ transplant     Immunosuppression (H)     Methemoglobinemia     Kidney replaced by transplant     Anxiety     Secondary renal hyperparathyroidism (H)     Vitamin D deficiency     CKD (chronic kidney disease) stage 3, GFR 30-59 ml/min (H)     Anemia of chronic renal failure, stage 3 (moderate) (H)     Stricture or kinking of ureter       Social History   Social History     Tobacco Use     Smoking status: Never Smoker     Smokeless tobacco: Never Used   Substance Use Topics     Alcohol use: No     Alcohol/week: 0.0 standard drinks     Drug use: No       Allergies   Allergies   Allergen Reactions     Chlorhexidine Rash     Rash at site     Sulfa Drugs Rash     Muscle stiffness of neck     Dapsone      Methemoglobinemia     Furosemide Other (See Comments)     Skin flushing     Lisinopril Swelling     angioedema       Medications   Current Outpatient Medications   Medication Sig     acetaminophen  (TYLENOL) 325 MG tablet Take 2 tablets (650 mg) by mouth every 4 hours as needed for mild pain     aspirin (ASA) 81 MG chewable tablet Take 1 tablet (81 mg) by mouth daily     atorvastatin (LIPITOR) 10 MG tablet Take 1 tablet (10 mg) by mouth daily     cinacalcet (SENSIPAR) 30 MG tablet Take 1 tablet (30 mg) by mouth daily (Patient taking differently: Take 30 mg by mouth every evening )     diphenhydrAMINE (BENADRYL) 25 MG tablet Take 1-2 tablets (25-50 mg) by mouth every 6 hours as needed for itching or allergies     EPINEPHrine (EPIPEN/ADRENACLICK/OR ANY BX GENERIC EQUIV) 0.3 MG/0.3ML injection 2-pack 0.3 mg     [START ON 12/5/2019] fludrocortisone (FLORINEF) 0.1 MG tablet Take 1 tablet (0.1 mg) by mouth twice a week     hydrOXYzine (ATARAX) 10 MG tablet Take 1 tablet (10 mg) by mouth 3 times daily as needed for anxiety     levothyroxine (SYNTHROID/LEVOTHROID) 25 MCG tablet Take 1 tablet (25 mcg) Tuesday, Thursday, Saturday and Sunday and 1.5 tablets (37.5 mcg) on Monday, Wednesday and Friday every week.     magnesium oxide (MAG-OX) 400 MG tablet Take 1 tablet (400 mg) by mouth 2 times daily     multivitamin RENAL (NEPHROCAPS/TRIPHROCAPS) 1 MG capsule Take 1 capsule by mouth daily     MYFORTIC (BRAND) 180 MG EC tablet Take 3 tablets (540 mg) by mouth 2 times daily     norethindrone (MICRONOR) 0.35 MG tablet Do not restart until your follow up appointment with your surgeon. Discuss restart date at that appointment.     pentamidine (NEBUPENT) 300 MG neb solution Inhale 300 mg into the lungs every 28 days     psyllium (METAMUCIL/KONSYL) capsule Take 1 capsule by mouth daily     simethicone (MYLICON) 125 MG chewable tablet Take 1 tablet (125 mg) by mouth 4 times daily as needed for intestinal gas     sodium bicarbonate 650 MG tablet Take 3 tablets (1,950 mg) by mouth 2 times daily     tacrolimus (GENERIC EQUIVALENT) 1 MG capsule Total dose = 7 mg BID     tacrolimus (GENERIC EQUIVALENT) 5 MG capsule Take 1 capsule (5  mg) by mouth 2 times daily Total dose = 7 mg BID     tacrolimus (GENERIC EQUIVALENT) 0.5 MG capsule HOLD (Patient not taking: Reported on 11/19/2019)     No current facility-administered medications for this visit.      Medications Discontinued During This Encounter   Medication Reason     fludrocortisone (FLORINEF) 0.1 MG tablet        Physical Exam   Vital Signs: BP (!) 136/90 (BP Location: Right arm, Patient Position: Chair, Cuff Size: Adult Regular)   Pulse 74   Temp 98  F (36.7  C) (Oral)   Wt 58.1 kg (128 lb 1.6 oz)   SpO2 100%   BMI 25.02 kg/m       GENERAL APPEARANCE: alert and no distress  HENT: mouth without ulcers or lesions  LYMPHATICS: no cervical or supraclavicular nodes  RESP: lungs clear to auscultation - no rales, rhonchi or wheezes  CV: regular rhythm, normal rate, no rub, no murmur  EDEMA: no LE edema bilaterally  ABDOMEN: soft, nondistended, nontender, bowel sounds normal  MS: extremities normal - no gross deformities noted, no evidence of inflammation in joints, no muscle tenderness  SKIN: no rash  TX KIDNEY: normal  DIALYSIS ACCESS:  LUE AV Fistula with good thrill    Data     Renal Latest Ref Rng & Units 12/3/2019 12/2/2019 11/25/2019   Na 133 - 144 mmol/L 139 140 139   Na (external) 136 - 145 mmol/L - - -   K 3.4 - 5.3 mmol/L 3.6 3.9 4.4   K (external) 3.5 - 5.0 mmol/L - - -   Cl 94 - 109 mmol/L 108 112(H) 111(H)   Cl (external) 98 - 107 mmol/L - - -   CO2 20 - 32 mmol/L 27 21 21   CO2 (external) 22 - 31 mmol/L - - -   BUN 7 - 30 mg/dL 29 30 31(H)   BUN (external) 8 - 22 mg/dL - - -   Cr 0.52 - 1.04 mg/dL 1.33(H) 1.22(H) 1.30(H)   Cr (external) 0.60 - 1 mg/dL - - -   Glucose 70 - 99 mg/dL 79 93 95   Glucose (external) 70 - 125 mg/dL - - -   Ca  8.5 - 10.1 mg/dL 9.6 9.8 9.9   Ca (external) 8.5 - 10.5 mg/dL - - -   Mg 1.6 - 2.3 mg/dL - 1.6 -   Mg (external) 1.8 - 2.6 mg/dL - - -     Bone Health Latest Ref Rng & Units 12/2/2019 11/19/2019 11/4/2019   Phos 2.5 - 4.5 mg/dL 2.0(L) 2.2(L)  2.3(L)   Phos (external) 2.5 - 4.5 mg/dL - - -   PTHi 18 - 80 pg/mL - - -   PTHi (external) 18 - 80 pg/mL - - -   Vit D Def 20 - 75 ug/L - - -     Heme Latest Ref Rng & Units 12/3/2019 12/2/2019 11/25/2019   WBC 4.0 - 11.0 10e9/L 4.3 4.1 4.8   WBC (external) 4.0 - 11.0 thou/uL - - -   Hgb 11.7 - 15.7 g/dL 9.9(L) 10.6(L) 10.4(L)   Hgb (external) 12.0 - 16.0 g/dL - - -   Plt 150 - 450 10e9/L 162 188 203   Plt (external) 140 - 440 thou/uL - - -     Liver Latest Ref Rng & Units 8/30/2019 8/2/2019 1/15/2019   AP 40 - 150 U/L 167(H) 65 -   AP (external) 34 - 104 U/L - - -   TBili 0.2 - 1.3 mg/dL 0.3 0.8 0.9   ALT 0 - 50 U/L 16 13 -   ALT (external) 7 - 52 U/L - - -   AST 0 - 45 U/L <3 4 -   AST (external) 13 - 39 U/L - - -   Tot Protein 6.8 - 8.8 g/dL 7.2 8.6 7.3   Tot Protein (external) 6.4 - 8.9 g/dL - - -   Albumin 3.4 - 5.0 g/dL 3.9 4.2 3.5   Albumin (external) - - - -     Pancreas Latest Ref Rng & Units 8/3/2019 8/2/2019   A1C 0 - 5.6 % 4.8 4.8     Iron studies Latest Ref Rng & Units 9/3/2019 8/30/2019 8/12/2019   Iron 35 - 180 ug/dL 87 138 96   Iron sat 15 - 46 % 34 51(H) 59(H)   Ferritin 12 - 150 ng/mL 1,535(H) 1,746(H) -   Ferritin (external) 10 - 291 ng/mL - - -     [facility] Txp Virology Latest Ref Rng & Units 11/4/2019 10/7/2019 9/17/2019   BK Spec - Plasma Plasma Plasma   BK Res BKNEG:BK Virus DNA Not Detected copies/mL BK Virus DNA Not Detected BK Virus DNA Not Detected BK Virus DNA Not Detected   BK Log <2.7 Log copies/mL Not Calculated Not Calculated Not Calculated   Hep B Surf - - - -        Recent Labs   Lab Test 11/18/19  0845 11/25/19  0842 12/02/19  0850   DOSTAC 11/17 2040 11/24 2030 12/1 2030   TACROL 8.7 10.3 9.7     Recent Labs   Lab Test 08/16/19  0807 09/03/19  0944   DOSMPA Not Provided 0840pm   MPACID 1.92 3.39   MPAG 149.2* 52.8       Again, thank you for allowing me to participate in the care of your patient.      Sincerely,    [facility]

## 2019-12-03 NOTE — PATIENT INSTRUCTIONS
Recommendations from today's MTM visit:                                                      1. Talk with transplant team about Florinef.    2. 6 months post transplant get Prevnar 13, then 8 weeks get Tetanus vaccination.    It was great to speak with you today.  I value your experience and would be very thankful for your time with providing feedback on our clinic survey. You may receive a survey via email or text message in the next few days.     Next MTM visit: as needed    To schedule another MTM appointment, please call the clinic directly or you may call the MTM scheduling line at [phone] or toll-free at [phone].     My Clinical Pharmacist's contact information:                                                      It was a pleasure talking with you today!  Please feel free to contact me with any questions or concerns you have.      Anthony Hendricks, PharmD  [facility] Pharmacist    Phone: [phone]          0

## 2019-12-03 NOTE — LETTER
12/3/2019      RE: Mona Wagner  [address]  [place] [address]       ACUTE TRANSPLANT NEPHROLOGY VISIT    Assessment & Plan   # DDKT: Stable, but has noted moderate hydronephrosis on kidney transplant ultrasound.  Patient has been seen by Urology and has repeat kidney transplant ultrasound today.   - Baseline Cr ~ 1.1-1.3   - Proteinuria: Minimal (0.2-0.5 grams)   - Date DSA Last Checked: Sep/2019      Latest DSA: No   - BK Viremia: No   - Kidney Tx Biopsy: Sep 17, 2019; Result: No diagnostic evidence of acute rejection.    # Immunosuppression: Tacrolimus immediate release (goal 8-10) and Mycophenolic acid (goal 1.0-3.5)   - Changes: No    # Infection Prophylaxis:   - PJP: Inhaled pentamidine  - CMV: None, prophylaxis completed  - Thrush: None    # Blood Pressure: Borderline control;  Goal BP: > 100, but < 130 systolic   - Volume status: Euvolemic   - Changes: Yes - With higher BP, will decrease Florinef to 2x per week.  If patient's BP continues to be in the 120s sytolic, may taper off Florinef.    # Anemia in Chronic Renal Disease: Hgb: Stable      CHARLINE: Yes   - Iron studies: Replete    # Mineral Bone Disorder:   - Secondary renal hyperparathyroidism; PTH level: Moderately elevated (301-600 pg/ml) and will continue on cinacalcet  - Vitamin D; level: Low        On Supplement: No due to hypercalcemia  - Calcium; level: High normal      On Supplement: No  - Phosphorus; level: Low        On Supplement: No    # Electrolytes:   - Potassium; level: Normal        On Supplement: No  - Magnesium; level: Normal        On Supplement: Yes  - Bicarbonate; level: Normal        On Supplement: Yes    # Transplant Ureteral Stenosis: Patient has planned repeat of kidney transplant ultrasound to see if symptoms have improved after inflammation has gone down.  She is being followed by Urology with planned cystoscopy and ureteral dilatation this week if hydronephrosis is still present.    # Medical Compliance: Yes    #  Transplant History:  Etiology of Kidney Failure: IgA nephropathy  Tx: DDKT  Transplant: 8/3/2019 (Kidney)  Donor Type: Donation after Circulatory Death  Donor Class: Standard Criteria Donor  Crossmatch at time of Tx: negative  DSA at time of Tx: No  Significant changes in immunosuppression: None  CMV IgG Ab High Risk Discordance (D+/R-): No  EBV IgG Ab High Risk Discordance (D+/R-): No  Significant transplant-related complications: None    Transplant Office Phone Number: [phone]    Assessment and plan was discussed with the patient and she voiced her understanding and agreement.    Return visit: Return for as previously scheduled.    Nakul Hill MD    Chief Complaint   Ms. Wagner is a 29 year old here for routine follow up.     History of Present Illness    Ms. Wagner reports feeling good overall with some medical complaints.  Since last clinic visit, patient reports no hospitalizations or new medical complaints and has been doing well overall.  She has had issues with moderate hydronephrosis and was referred by Transplant Surgery to Urology for evaluation.  Patient is scheduled to have a repeat kidney transplant ultrasound today and depending on the findings, she may have cystoscopy and ureteral dilatation by Urology this Friday.    Her energy level is good and pretty much back to normal.  Patient is back to work and active, although only gets minimal exercise.  She reports when she tries to jog, patient will get increased pain over her allograft.  Denies any chest pain or shortness of breath with exertion.  Appetite is good and her weight is up ~ 10-15 lbs.  No nausea or vomiting.  She generally has a normal, formed bowel movement in the morning and then later in the day will have some loose stools.  No fever, sweats or chills.  No leg swelling, but may get some puffiness if she is on her feet all day.    Recent Hospitalizations:  [x] No [] Yes    New Medical Issues: [] No [x] Yes Hydronephrosis    Decreased energy: [x] No [] Yes    Chest pain or SOB with exertion:  [x] No [] Yes    Appetite change or weight change: [] No [x] Yes Weight is up ~ 10-15 lbs   Nausea, vomiting or diarrhea:  [] No [x] Yes Occasional loose stools   Fever, sweats or chills: [x] No [] Yes    Leg swelling: [x] No [] Yes      Home BP: 120/80s    Review of Systems   A comprehensive review of systems was obtained and negative, except as noted in the HPI or PMH.    Problem List   Patient Active Problem List   Diagnosis     DVT of upper extremity (deep vein thrombosis) (H)     Seizure disorder (H)     Other general symptoms(780.99)     Galactorrhea     Acquired hypothyroidism     Anemia in chronic kidney disease     Increased prolactin level (H)     Hypomagnesemia     Normocytic anemia     Thrombocytopenia (H)     Infective endocarditis     Aftercare following organ transplant     Immunosuppression (H)     Methemoglobinemia     Kidney replaced by transplant     Anxiety     Secondary renal hyperparathyroidism (H)     Vitamin D deficiency     CKD (chronic kidney disease) stage 3, GFR 30-59 ml/min (H)     Anemia of chronic renal failure, stage 3 (moderate) (H)     Stricture or kinking of ureter       Social History   Social History     Tobacco Use     Smoking status: Never Smoker     Smokeless tobacco: Never Used   Substance Use Topics     Alcohol use: No     Alcohol/week: 0.0 standard drinks     Drug use: No       Allergies   Allergies   Allergen Reactions     Chlorhexidine Rash     Rash at site     Sulfa Drugs Rash     Muscle stiffness of neck     Dapsone      Methemoglobinemia     Furosemide Other (See Comments)     Skin flushing     Lisinopril Swelling     angioedema       Medications   Current Outpatient Medications   Medication Sig     acetaminophen (TYLENOL) 325 MG tablet Take 2 tablets (650 mg) by mouth every 4 hours as needed for mild pain     aspirin (ASA) 81 MG chewable tablet Take 1 tablet (81 mg) by mouth daily     atorvastatin  (LIPITOR) 10 MG tablet Take 1 tablet (10 mg) by mouth daily     cinacalcet (SENSIPAR) 30 MG tablet Take 1 tablet (30 mg) by mouth daily (Patient taking differently: Take 30 mg by mouth every evening )     diphenhydrAMINE (BENADRYL) 25 MG tablet Take 1-2 tablets (25-50 mg) by mouth every 6 hours as needed for itching or allergies     EPINEPHrine (EPIPEN/ADRENACLICK/OR ANY BX GENERIC EQUIV) 0.3 MG/0.3ML injection 2-pack 0.3 mg     [START ON 12/5/2019] fludrocortisone (FLORINEF) 0.1 MG tablet Take 1 tablet (0.1 mg) by mouth twice a week     hydrOXYzine (ATARAX) 10 MG tablet Take 1 tablet (10 mg) by mouth 3 times daily as needed for anxiety     levothyroxine (SYNTHROID/LEVOTHROID) 25 MCG tablet Take 1 tablet (25 mcg) Tuesday, Thursday, Saturday and Sunday and 1.5 tablets (37.5 mcg) on Monday, Wednesday and Friday every week.     magnesium oxide (MAG-OX) 400 MG tablet Take 1 tablet (400 mg) by mouth 2 times daily     multivitamin RENAL (NEPHROCAPS/TRIPHROCAPS) 1 MG capsule Take 1 capsule by mouth daily     MYFORTIC (BRAND) 180 MG EC tablet Take 3 tablets (540 mg) by mouth 2 times daily     norethindrone (MICRONOR) 0.35 MG tablet Do not restart until your follow up appointment with your surgeon. Discuss restart date at that appointment.     pentamidine (NEBUPENT) 300 MG neb solution Inhale 300 mg into the lungs every 28 days     psyllium (METAMUCIL/KONSYL) capsule Take 1 capsule by mouth daily     simethicone (MYLICON) 125 MG chewable tablet Take 1 tablet (125 mg) by mouth 4 times daily as needed for intestinal gas     sodium bicarbonate 650 MG tablet Take 3 tablets (1,950 mg) by mouth 2 times daily     tacrolimus (GENERIC EQUIVALENT) 1 MG capsule Total dose = 7 mg BID     tacrolimus (GENERIC EQUIVALENT) 5 MG capsule Take 1 capsule (5 mg) by mouth 2 times daily Total dose = 7 mg BID     tacrolimus (GENERIC EQUIVALENT) 0.5 MG capsule HOLD (Patient not taking: Reported on 11/19/2019)     No current facility-administered  medications for this visit.      Medications Discontinued During This Encounter   Medication Reason     fludrocortisone (FLORINEF) 0.1 MG tablet        Physical Exam   Vital Signs: BP (!) 136/90 (BP Location: Right arm, Patient Position: Chair, Cuff Size: Adult Regular)   Pulse 74   Temp 98  F (36.7  C) (Oral)   Wt 58.1 kg (128 lb 1.6 oz)   SpO2 100%   BMI 25.02 kg/m       GENERAL APPEARANCE: alert and no distress  HENT: mouth without ulcers or lesions  LYMPHATICS: no cervical or supraclavicular nodes  RESP: lungs clear to auscultation - no rales, rhonchi or wheezes  CV: regular rhythm, normal rate, no rub, no murmur  EDEMA: no LE edema bilaterally  ABDOMEN: soft, nondistended, nontender, bowel sounds normal  MS: extremities normal - no gross deformities noted, no evidence of inflammation in joints, no muscle tenderness  SKIN: no rash  TX KIDNEY: normal  DIALYSIS ACCESS:  LUE AV Fistula with good thrill    Data     Renal Latest Ref Rng & Units 12/3/2019 12/2/2019 11/25/2019   Na 133 - 144 mmol/L 139 140 139   Na (external) 136 - 145 mmol/L - - -   K 3.4 - 5.3 mmol/L 3.6 3.9 4.4   K (external) 3.5 - 5.0 mmol/L - - -   Cl 94 - 109 mmol/L 108 112(H) 111(H)   Cl (external) 98 - 107 mmol/L - - -   CO2 20 - 32 mmol/L 27 21 21   CO2 (external) 22 - 31 mmol/L - - -   BUN 7 - 30 mg/dL 29 30 31(H)   BUN (external) 8 - 22 mg/dL - - -   Cr 0.52 - 1.04 mg/dL 1.33(H) 1.22(H) 1.30(H)   Cr (external) 0.60 - 1 mg/dL - - -   Glucose 70 - 99 mg/dL 79 93 95   Glucose (external) 70 - 125 mg/dL - - -   Ca  8.5 - 10.1 mg/dL 9.6 9.8 9.9   Ca (external) 8.5 - 10.5 mg/dL - - -   Mg 1.6 - 2.3 mg/dL - 1.6 -   Mg (external) 1.8 - 2.6 mg/dL - - -     Bone Health Latest Ref Rng & Units 12/2/2019 11/19/2019 11/4/2019   Phos 2.5 - 4.5 mg/dL 2.0(L) 2.2(L) 2.3(L)   Phos (external) 2.5 - 4.5 mg/dL - - -   PTHi 18 - 80 pg/mL - - -   PTHi (external) 18 - 80 pg/mL - - -   Vit D Def 20 - 75 ug/L - - -     Heme Latest Ref Rng & Units 12/3/2019  12/2/2019 11/25/2019   WBC 4.0 - 11.0 10e9/L 4.3 4.1 4.8   WBC (external) 4.0 - 11.0 thou/uL - - -   Hgb 11.7 - 15.7 g/dL 9.9(L) 10.6(L) 10.4(L)   Hgb (external) 12.0 - 16.0 g/dL - - -   Plt 150 - 450 10e9/L 162 188 203   Plt (external) 140 - 440 thou/uL - - -     Liver Latest Ref Rng & Units 8/30/2019 8/2/2019 1/15/2019   AP 40 - 150 U/L 167(H) 65 -   AP (external) 34 - 104 U/L - - -   TBili 0.2 - 1.3 mg/dL 0.3 0.8 0.9   ALT 0 - 50 U/L 16 13 -   ALT (external) 7 - 52 U/L - - -   AST 0 - 45 U/L <3 4 -   AST (external) 13 - 39 U/L - - -   Tot Protein 6.8 - 8.8 g/dL 7.2 8.6 7.3   Tot Protein (external) 6.4 - 8.9 g/dL - - -   Albumin 3.4 - 5.0 g/dL 3.9 4.2 3.5   Albumin (external) - - - -     Pancreas Latest Ref Rng & Units 8/3/2019 8/2/2019   A1C 0 - 5.6 % 4.8 4.8     Iron studies Latest Ref Rng & Units 9/3/2019 8/30/2019 8/12/2019   Iron 35 - 180 ug/dL 87 138 96   Iron sat 15 - 46 % 34 51(H) 59(H)   Ferritin 12 - 150 ng/mL 1,535(H) 1,746(H) -   Ferritin (external) 10 - 291 ng/mL - - -     [facility] Txp Virology Latest Ref Rng & Units 11/4/2019 10/7/2019 9/17/2019   BK Spec - Plasma Plasma Plasma   BK Res BKNEG:BK Virus DNA Not Detected copies/mL BK Virus DNA Not Detected BK Virus DNA Not Detected BK Virus DNA Not Detected   BK Log <2.7 Log copies/mL Not Calculated Not Calculated Not Calculated   Hep B Surf - - - -        Recent Labs   Lab Test 11/18/19  0845 11/25/19  0842 12/02/19  0850   DOSTAC 11/17 2040 11/24 2030 12/1 2030   TACROL 8.7 10.3 9.7     Recent Labs   Lab Test 08/16/19  0807 09/03/19  0944   DOSMPA Not Provided 0840pm   MPACID 1.92 3.39   MPAG 149.2* 52.8     Nakul Hill MD

## 2019-12-03 NOTE — PROGRESS NOTES
ACUTE TRANSPLANT NEPHROLOGY VISIT    Assessment & Plan   # DDKT: Stable, but has noted moderate hydronephrosis on kidney transplant ultrasound.  Patient has been seen by Urology and has repeat kidney transplant ultrasound today.   - Baseline Cr ~ 1.1-1.3   - Proteinuria: Minimal (0.2-0.5 grams)   - Date DSA Last Checked: Sep/2019      Latest DSA: No   - BK Viremia: No   - Kidney Tx Biopsy: Sep 17, 2019; Result: No diagnostic evidence of acute rejection.    # Immunosuppression: Tacrolimus immediate release (goal 8-10) and Mycophenolic acid (goal 1.0-3.5)   - Changes: No    # Infection Prophylaxis:   - PJP: Inhaled pentamidine  - CMV: None, prophylaxis completed  - Thrush: None    # Blood Pressure: Borderline control;  Goal BP: > 100, but < 130 systolic   - Volume status: Euvolemic   - Changes: Yes - With higher BP, will decrease Florinef to 2x per week.  If patient's BP continues to be in the 120s sytolic, may taper off Florinef.    # Anemia in Chronic Renal Disease: Hgb: Stable      CHARLINE: Yes   - Iron studies: Replete    # Mineral Bone Disorder:   - Secondary renal hyperparathyroidism; PTH level: Moderately elevated (301-600 pg/ml) and will continue on cinacalcet  - Vitamin D; level: Low        On Supplement: No due to hypercalcemia  - Calcium; level: High normal      On Supplement: No  - Phosphorus; level: Low        On Supplement: No    # Electrolytes:   - Potassium; level: Normal        On Supplement: No  - Magnesium; level: Normal        On Supplement: Yes  - Bicarbonate; level: Normal        On Supplement: Yes    # Transplant Ureteral Stenosis: Patient has planned repeat of kidney transplant ultrasound to see if symptoms have improved after inflammation has gone down.  She is being followed by Urology with planned cystoscopy and ureteral dilatation this week if hydronephrosis is still present.    # Medical Compliance: Yes    # Transplant History:  Etiology of Kidney Failure: IgA nephropathy  Tx:  DDKT  Transplant: 8/3/2019 (Kidney)  Donor Type: Donation after Circulatory Death  Donor Class: Standard Criteria Donor  Crossmatch at time of Tx: negative  DSA at time of Tx: No  Significant changes in immunosuppression: None  CMV IgG Ab High Risk Discordance (D+/R-): No  EBV IgG Ab High Risk Discordance (D+/R-): No  Significant transplant-related complications: None    Transplant Office Phone Number: [phone]    Assessment and plan was discussed with the patient and she voiced her understanding and agreement.    Return visit: Return for as previously scheduled.    Nakul Hill MD    Chief Complaint   Ms. Wagner is a 29 year old here for routine follow up.     History of Present Illness    Ms. Wagner reports feeling good overall with some medical complaints.  Since last clinic visit, patient reports no hospitalizations or new medical complaints and has been doing well overall.  She has had issues with moderate hydronephrosis and was referred by Transplant Surgery to Urology for evaluation.  Patient is scheduled to have a repeat kidney transplant ultrasound today and depending on the findings, she may have cystoscopy and ureteral dilatation by Urology this Friday.    Her energy level is good and pretty much back to normal.  Patient is back to work and active, although only gets minimal exercise.  She reports when she tries to jog, patient will get increased pain over her allograft.  Denies any chest pain or shortness of breath with exertion.  Appetite is good and her weight is up ~ 10-15 lbs.  No nausea or vomiting.  She generally has a normal, formed bowel movement in the morning and then later in the day will have some loose stools.  No fever, sweats or chills.  No leg swelling, but may get some puffiness if she is on her feet all day.    Recent Hospitalizations:  [x] No [] Yes    New Medical Issues: [] No [x] Yes Hydronephrosis   Decreased energy: [x] No [] Yes    Chest pain or SOB with exertion:  [x] No  [] Yes    Appetite change or weight change: [] No [x] Yes Weight is up ~ 10-15 lbs   Nausea, vomiting or diarrhea:  [] No [x] Yes Occasional loose stools   Fever, sweats or chills: [x] No [] Yes    Leg swelling: [x] No [] Yes      Home BP: 120/80s    Review of Systems   A comprehensive review of systems was obtained and negative, except as noted in the HPI or PMH.    Problem List   Patient Active Problem List   Diagnosis     DVT of upper extremity (deep vein thrombosis) (H)     Seizure disorder (H)     Other general symptoms(780.99)     Galactorrhea     Acquired hypothyroidism     Anemia in chronic kidney disease     Increased prolactin level (H)     Hypomagnesemia     Normocytic anemia     Thrombocytopenia (H)     Infective endocarditis     Aftercare following organ transplant     Immunosuppression (H)     Methemoglobinemia     Kidney replaced by transplant     Anxiety     Secondary renal hyperparathyroidism (H)     Vitamin D deficiency     CKD (chronic kidney disease) stage 3, GFR 30-59 ml/min (H)     Anemia of chronic renal failure, stage 3 (moderate) (H)     Stricture or kinking of ureter       Social History   Social History     Tobacco Use     Smoking status: Never Smoker     Smokeless tobacco: Never Used   Substance Use Topics     Alcohol use: No     Alcohol/week: 0.0 standard drinks     Drug use: No       Allergies   Allergies   Allergen Reactions     Chlorhexidine Rash     Rash at site     Sulfa Drugs Rash     Muscle stiffness of neck     Dapsone      Methemoglobinemia     Furosemide Other (See Comments)     Skin flushing     Lisinopril Swelling     angioedema       Medications   Current Outpatient Medications   Medication Sig     acetaminophen (TYLENOL) 325 MG tablet Take 2 tablets (650 mg) by mouth every 4 hours as needed for mild pain     aspirin (ASA) 81 MG chewable tablet Take 1 tablet (81 mg) by mouth daily     atorvastatin (LIPITOR) 10 MG tablet Take 1 tablet (10 mg) by mouth daily     cinacalcet  (SENSIPAR) 30 MG tablet Take 1 tablet (30 mg) by mouth daily (Patient taking differently: Take 30 mg by mouth every evening )     diphenhydrAMINE (BENADRYL) 25 MG tablet Take 1-2 tablets (25-50 mg) by mouth every 6 hours as needed for itching or allergies     EPINEPHrine (EPIPEN/ADRENACLICK/OR ANY BX GENERIC EQUIV) 0.3 MG/0.3ML injection 2-pack 0.3 mg     [START ON 12/5/2019] fludrocortisone (FLORINEF) 0.1 MG tablet Take 1 tablet (0.1 mg) by mouth twice a week     hydrOXYzine (ATARAX) 10 MG tablet Take 1 tablet (10 mg) by mouth 3 times daily as needed for anxiety     levothyroxine (SYNTHROID/LEVOTHROID) 25 MCG tablet Take 1 tablet (25 mcg) Tuesday, Thursday, Saturday and Sunday and 1.5 tablets (37.5 mcg) on Monday, Wednesday and Friday every week.     magnesium oxide (MAG-OX) 400 MG tablet Take 1 tablet (400 mg) by mouth 2 times daily     multivitamin RENAL (NEPHROCAPS/TRIPHROCAPS) 1 MG capsule Take 1 capsule by mouth daily     MYFORTIC (BRAND) 180 MG EC tablet Take 3 tablets (540 mg) by mouth 2 times daily     norethindrone (MICRONOR) 0.35 MG tablet Do not restart until your follow up appointment with your surgeon. Discuss restart date at that appointment.     pentamidine (NEBUPENT) 300 MG neb solution Inhale 300 mg into the lungs every 28 days     psyllium (METAMUCIL/KONSYL) capsule Take 1 capsule by mouth daily     simethicone (MYLICON) 125 MG chewable tablet Take 1 tablet (125 mg) by mouth 4 times daily as needed for intestinal gas     sodium bicarbonate 650 MG tablet Take 3 tablets (1,950 mg) by mouth 2 times daily     tacrolimus (GENERIC EQUIVALENT) 1 MG capsule Total dose = 7 mg BID     tacrolimus (GENERIC EQUIVALENT) 5 MG capsule Take 1 capsule (5 mg) by mouth 2 times daily Total dose = 7 mg BID     tacrolimus (GENERIC EQUIVALENT) 0.5 MG capsule HOLD (Patient not taking: Reported on 11/19/2019)     No current facility-administered medications for this visit.      Medications Discontinued During This  Encounter   Medication Reason     fludrocortisone (FLORINEF) 0.1 MG tablet        Physical Exam   Vital Signs: BP (!) 136/90 (BP Location: Right arm, Patient Position: Chair, Cuff Size: Adult Regular)   Pulse 74   Temp 98  F (36.7  C) (Oral)   Wt 58.1 kg (128 lb 1.6 oz)   SpO2 100%   BMI 25.02 kg/m      GENERAL APPEARANCE: alert and no distress  HENT: mouth without ulcers or lesions  LYMPHATICS: no cervical or supraclavicular nodes  RESP: lungs clear to auscultation - no rales, rhonchi or wheezes  CV: regular rhythm, normal rate, no rub, no murmur  EDEMA: no LE edema bilaterally  ABDOMEN: soft, nondistended, nontender, bowel sounds normal  MS: extremities normal - no gross deformities noted, no evidence of inflammation in joints, no muscle tenderness  SKIN: no rash  TX KIDNEY: normal  DIALYSIS ACCESS:  LUE AV Fistula with good thrill    Data     Renal Latest Ref Rng & Units 12/3/2019 12/2/2019 11/25/2019   Na 133 - 144 mmol/L 139 140 139   Na (external) 136 - 145 mmol/L - - -   K 3.4 - 5.3 mmol/L 3.6 3.9 4.4   K (external) 3.5 - 5.0 mmol/L - - -   Cl 94 - 109 mmol/L 108 112(H) 111(H)   Cl (external) 98 - 107 mmol/L - - -   CO2 20 - 32 mmol/L 27 21 21   CO2 (external) 22 - 31 mmol/L - - -   BUN 7 - 30 mg/dL 29 30 31(H)   BUN (external) 8 - 22 mg/dL - - -   Cr 0.52 - 1.04 mg/dL 1.33(H) 1.22(H) 1.30(H)   Cr (external) 0.60 - 1 mg/dL - - -   Glucose 70 - 99 mg/dL 79 93 95   Glucose (external) 70 - 125 mg/dL - - -   Ca  8.5 - 10.1 mg/dL 9.6 9.8 9.9   Ca (external) 8.5 - 10.5 mg/dL - - -   Mg 1.6 - 2.3 mg/dL - 1.6 -   Mg (external) 1.8 - 2.6 mg/dL - - -     Bone Health Latest Ref Rng & Units 12/2/2019 11/19/2019 11/4/2019   Phos 2.5 - 4.5 mg/dL 2.0(L) 2.2(L) 2.3(L)   Phos (external) 2.5 - 4.5 mg/dL - - -   PTHi 18 - 80 pg/mL - - -   PTHi (external) 18 - 80 pg/mL - - -   Vit D Def 20 - 75 ug/L - - -     Heme Latest Ref Rng & Units 12/3/2019 12/2/2019 11/25/2019   WBC 4.0 - 11.0 10e9/L 4.3 4.1 4.8   WBC (external) 4.0  - 11.0 thou/uL - - -   Hgb 11.7 - 15.7 g/dL 9.9(L) 10.6(L) 10.4(L)   Hgb (external) 12.0 - 16.0 g/dL - - -   Plt 150 - 450 10e9/L 162 188 203   Plt (external) 140 - 440 thou/uL - - -     Liver Latest Ref Rng & Units 8/30/2019 8/2/2019 1/15/2019   AP 40 - 150 U/L 167(H) 65 -   AP (external) 34 - 104 U/L - - -   TBili 0.2 - 1.3 mg/dL 0.3 0.8 0.9   ALT 0 - 50 U/L 16 13 -   ALT (external) 7 - 52 U/L - - -   AST 0 - 45 U/L <3 4 -   AST (external) 13 - 39 U/L - - -   Tot Protein 6.8 - 8.8 g/dL 7.2 8.6 7.3   Tot Protein (external) 6.4 - 8.9 g/dL - - -   Albumin 3.4 - 5.0 g/dL 3.9 4.2 3.5   Albumin (external) - - - -     Pancreas Latest Ref Rng & Units 8/3/2019 8/2/2019   A1C 0 - 5.6 % 4.8 4.8     Iron studies Latest Ref Rng & Units 9/3/2019 8/30/2019 8/12/2019   Iron 35 - 180 ug/dL 87 138 96   Iron sat 15 - 46 % 34 51(H) 59(H)   Ferritin 12 - 150 ng/mL 1,535(H) 1,746(H) -   Ferritin (external) 10 - 291 ng/mL - - -     [facility] Txp Virology Latest Ref Rng & Units 11/4/2019 10/7/2019 9/17/2019   BK Spec - Plasma Plasma Plasma   BK Res BKNEG:BK Virus DNA Not Detected copies/mL BK Virus DNA Not Detected BK Virus DNA Not Detected BK Virus DNA Not Detected   BK Log <2.7 Log copies/mL Not Calculated Not Calculated Not Calculated   Hep B Surf - - - -        Recent Labs   Lab Test 11/18/19  0845 11/25/19  0842 12/02/19  0850   DOSTAC 11/17 2040 11/24 2030 12/1 2030   TACROL 8.7 10.3 9.7     Recent Labs   Lab Test 08/16/19  0807 09/03/19  0944   DOSMPA Not Provided 0840pm   MPACID 1.92 3.39   MPAG 149.2* 52.8

## 2019-12-03 NOTE — NURSING NOTE
Chief Complaint   Patient presents with     RECHECK     4 month follow up kidney tx     Blood pressure (!) 136/90, pulse 74, temperature 98  F (36.7  C), temperature source Oral, weight 58.1 kg (128 lb 1.6 oz), SpO2 100 %, not currently breastfeeding.    Allison Dalal/SHAYY  December 3, 2019 11:26 AM

## 2019-12-04 ENCOUNTER — TELEPHONE (OUTPATIENT)
Dept: TRANSPLANT | Facility: CLINIC | Age: 29
End: 2019-12-04

## 2019-12-04 LAB
BKV DNA # SPEC NAA+PROBE: NORMAL COPIES/ML
BKV DNA # SPEC NAA+PROBE: NORMAL COPIES/ML
BKV DNA SPEC NAA+PROBE-LOG#: NORMAL LOG COPIES/ML
BKV DNA SPEC NAA+PROBE-LOG#: NORMAL LOG COPIES/ML
SPECIMEN SOURCE: NORMAL
SPECIMEN SOURCE: NORMAL

## 2019-12-04 NOTE — TELEPHONE ENCOUNTER
Post Kidney and Pancreas Transplant Team Conference  Date: 12/4/2019  Transplant Coordinator: Laurence Vazquez     Attendees:  [x]  Dr. Hill  [x] Leonela Brewster LPN     []  Dr. Wilson [x] Cinthia Cox RN [] Kiersten Chang LPN   []  Dr. Rios [] Gely Lynn RN     [] Keeley Li RN [] Anthony Hendricks, PharmD   [] Dr. Johnson [x] Melani Vazquez RN    [] Dr. Durbin [] Virgilio Ryan RN    [x] Dr. Pinto [] Ophelia Oneal RN    [x] Dr. Delgadillo [] Yesika Wong RN     [] Melanie Ayala RN    [] Surgery Fellow [x] Nancy Maldonado RN    [] Johana Linda NP [] Nathalia Moreno RN        Verbal Plan Read Back:   Waiting for surgery for recommendations     Routed to RN Coordinator   Leonela Brewster LPN

## 2019-12-05 ENCOUNTER — TELEPHONE (OUTPATIENT)
Dept: TRANSPLANT | Facility: CLINIC | Age: 29
End: 2019-12-05

## 2019-12-05 NOTE — TELEPHONE ENCOUNTER
For response to EATONhart message:    Discussed in committee revew on 12/4 - plan to continue pursue of retrograde cystoscopy.    Mona voiced understanding.    Continue weekly labs through 12/16, will discuss after that time if we can reduce lab frequency.

## 2019-12-06 ENCOUNTER — ANESTHESIA (OUTPATIENT)
Dept: SURGERY | Facility: AMBULATORY SURGERY CENTER | Age: 29
End: 2019-12-06

## 2019-12-06 ENCOUNTER — HOSPITAL ENCOUNTER (OUTPATIENT)
Facility: AMBULATORY SURGERY CENTER | Age: 29
End: 2019-12-06
Attending: UROLOGY
Payer: MEDICARE

## 2019-12-06 ENCOUNTER — ANCILLARY PROCEDURE (OUTPATIENT)
Dept: RADIOLOGY | Facility: AMBULATORY SURGERY CENTER | Age: 29
End: 2019-12-06
Attending: UROLOGY
Payer: MEDICARE

## 2019-12-06 VITALS
HEIGHT: 60 IN | HEART RATE: 74 BPM | TEMPERATURE: 97.4 F | BODY MASS INDEX: 27.09 KG/M2 | DIASTOLIC BLOOD PRESSURE: 85 MMHG | WEIGHT: 138 LBS | OXYGEN SATURATION: 100 % | RESPIRATION RATE: 16 BRPM | SYSTOLIC BLOOD PRESSURE: 123 MMHG

## 2019-12-06 DIAGNOSIS — N13.5 STRICTURE OR KINKING OF URETER: ICD-10-CM

## 2019-12-06 DIAGNOSIS — R32 URINARY INCONTINENCE, UNSPECIFIED TYPE: ICD-10-CM

## 2019-12-06 LAB
DONOR IDENTIFICATION: NORMAL
DSA COMMENTS: NORMAL
DSA PRESENT: NO
DSA TEST METHOD: NORMAL
HCG UR QL: NEGATIVE
INTERNAL QC OK POCT: YES
ORGAN: NORMAL
SA1 CELL: NORMAL
SA1 COMMENTS: NORMAL
SA1 HI RISK ABY: NORMAL
SA1 MOD RISK ABY: NORMAL
SA1 TEST METHOD: NORMAL
SA2 CELL: NORMAL
SA2 COMMENTS: NORMAL
SA2 HI RISK ABY UA: NORMAL
SA2 MOD RISK ABY: NORMAL
SA2 TEST METHOD: NORMAL
UNACCEPTABLE ANTIGEN: NORMAL
UNOS CPRA: 25

## 2019-12-06 DEVICE — STENT URETERAL PERCUFLEX PLUS 6FRX22CM M0061752610: Type: IMPLANTABLE DEVICE | Site: KIDNEY | Status: FUNCTIONAL

## 2019-12-06 RX ORDER — TAMSULOSIN HYDROCHLORIDE 0.4 MG/1
0.4 CAPSULE ORAL DAILY
Qty: 20 CAPSULE | Refills: 0 | Status: SHIPPED | OUTPATIENT
Start: 2019-12-06 | End: 2020-01-23

## 2019-12-06 RX ORDER — SODIUM CHLORIDE, SODIUM LACTATE, POTASSIUM CHLORIDE, CALCIUM CHLORIDE 600; 310; 30; 20 MG/100ML; MG/100ML; MG/100ML; MG/100ML
INJECTION, SOLUTION INTRAVENOUS CONTINUOUS
Status: DISCONTINUED | OUTPATIENT
Start: 2019-12-06 | End: 2019-12-07 | Stop reason: HOSPADM

## 2019-12-06 RX ORDER — LIDOCAINE HYDROCHLORIDE 20 MG/ML
INJECTION, SOLUTION INFILTRATION; PERINEURAL PRN
Status: DISCONTINUED | OUTPATIENT
Start: 2019-12-06 | End: 2019-12-06

## 2019-12-06 RX ORDER — FENTANYL CITRATE 50 UG/ML
25-50 INJECTION, SOLUTION INTRAMUSCULAR; INTRAVENOUS
Status: DISCONTINUED | OUTPATIENT
Start: 2019-12-06 | End: 2019-12-06 | Stop reason: HOSPADM

## 2019-12-06 RX ORDER — PROPOFOL 10 MG/ML
INJECTION, EMULSION INTRAVENOUS CONTINUOUS PRN
Status: DISCONTINUED | OUTPATIENT
Start: 2019-12-06 | End: 2019-12-06

## 2019-12-06 RX ORDER — OXYCODONE HYDROCHLORIDE 5 MG/1
5-10 TABLET ORAL EVERY 4 HOURS PRN
Qty: 6 TABLET | Refills: 0 | Status: SHIPPED | OUTPATIENT
Start: 2019-12-06 | End: 2020-01-23

## 2019-12-06 RX ORDER — OXYCODONE HYDROCHLORIDE 5 MG/1
5 TABLET ORAL EVERY 4 HOURS PRN
Status: DISCONTINUED | OUTPATIENT
Start: 2019-12-06 | End: 2019-12-07 | Stop reason: HOSPADM

## 2019-12-06 RX ORDER — MEPERIDINE HYDROCHLORIDE 25 MG/ML
12.5 INJECTION INTRAMUSCULAR; INTRAVENOUS; SUBCUTANEOUS
Status: DISCONTINUED | OUTPATIENT
Start: 2019-12-06 | End: 2019-12-07 | Stop reason: HOSPADM

## 2019-12-06 RX ORDER — GABAPENTIN 300 MG/1
300 CAPSULE ORAL ONCE
Status: COMPLETED | OUTPATIENT
Start: 2019-12-06 | End: 2019-12-06

## 2019-12-06 RX ORDER — LIDOCAINE 40 MG/G
CREAM TOPICAL
Status: DISCONTINUED | OUTPATIENT
Start: 2019-12-06 | End: 2019-12-06 | Stop reason: HOSPADM

## 2019-12-06 RX ORDER — FENTANYL CITRATE 50 UG/ML
25-50 INJECTION, SOLUTION INTRAMUSCULAR; INTRAVENOUS
Status: DISCONTINUED | OUTPATIENT
Start: 2019-12-06 | End: 2019-12-07 | Stop reason: HOSPADM

## 2019-12-06 RX ORDER — ACETAMINOPHEN 325 MG/1
975 TABLET ORAL ONCE
Status: COMPLETED | OUTPATIENT
Start: 2019-12-06 | End: 2019-12-06

## 2019-12-06 RX ORDER — ONDANSETRON 4 MG/1
4 TABLET, ORALLY DISINTEGRATING ORAL EVERY 30 MIN PRN
Status: DISCONTINUED | OUTPATIENT
Start: 2019-12-06 | End: 2019-12-07 | Stop reason: HOSPADM

## 2019-12-06 RX ORDER — ONDANSETRON 2 MG/ML
4 INJECTION INTRAMUSCULAR; INTRAVENOUS EVERY 30 MIN PRN
Status: DISCONTINUED | OUTPATIENT
Start: 2019-12-06 | End: 2019-12-07 | Stop reason: HOSPADM

## 2019-12-06 RX ORDER — HYDROMORPHONE HYDROCHLORIDE 1 MG/ML
.3-.5 INJECTION, SOLUTION INTRAMUSCULAR; INTRAVENOUS; SUBCUTANEOUS EVERY 10 MIN PRN
Status: DISCONTINUED | OUTPATIENT
Start: 2019-12-06 | End: 2019-12-07 | Stop reason: HOSPADM

## 2019-12-06 RX ORDER — ONDANSETRON 2 MG/ML
INJECTION INTRAMUSCULAR; INTRAVENOUS PRN
Status: DISCONTINUED | OUTPATIENT
Start: 2019-12-06 | End: 2019-12-06

## 2019-12-06 RX ORDER — CEFAZOLIN SODIUM 1 G/50ML
1 SOLUTION INTRAVENOUS SEE ADMIN INSTRUCTIONS
Status: DISCONTINUED | OUTPATIENT
Start: 2019-12-06 | End: 2019-12-06 | Stop reason: HOSPADM

## 2019-12-06 RX ORDER — NALOXONE HYDROCHLORIDE 0.4 MG/ML
.1-.4 INJECTION, SOLUTION INTRAMUSCULAR; INTRAVENOUS; SUBCUTANEOUS
Status: DISCONTINUED | OUTPATIENT
Start: 2019-12-06 | End: 2019-12-07 | Stop reason: HOSPADM

## 2019-12-06 RX ORDER — SODIUM CHLORIDE, SODIUM LACTATE, POTASSIUM CHLORIDE, CALCIUM CHLORIDE 600; 310; 30; 20 MG/100ML; MG/100ML; MG/100ML; MG/100ML
INJECTION, SOLUTION INTRAVENOUS CONTINUOUS
Status: DISCONTINUED | OUTPATIENT
Start: 2019-12-06 | End: 2019-12-06 | Stop reason: HOSPADM

## 2019-12-06 RX ORDER — FENTANYL CITRATE 50 UG/ML
INJECTION, SOLUTION INTRAMUSCULAR; INTRAVENOUS PRN
Status: DISCONTINUED | OUTPATIENT
Start: 2019-12-06 | End: 2019-12-06

## 2019-12-06 RX ORDER — CEFAZOLIN SODIUM 2 G/50ML
2 SOLUTION INTRAVENOUS
Status: COMPLETED | OUTPATIENT
Start: 2019-12-06 | End: 2019-12-06

## 2019-12-06 RX ORDER — SODIUM CHLORIDE, SODIUM LACTATE, POTASSIUM CHLORIDE, CALCIUM CHLORIDE 600; 310; 30; 20 MG/100ML; MG/100ML; MG/100ML; MG/100ML
INJECTION, SOLUTION INTRAVENOUS CONTINUOUS PRN
Status: DISCONTINUED | OUTPATIENT
Start: 2019-12-06 | End: 2019-12-06

## 2019-12-06 RX ORDER — ACETAMINOPHEN 325 MG/1
975 TABLET ORAL ONCE
Status: DISCONTINUED | OUTPATIENT
Start: 2019-12-06 | End: 2019-12-06 | Stop reason: HOSPADM

## 2019-12-06 RX ORDER — PROPOFOL 10 MG/ML
INJECTION, EMULSION INTRAVENOUS PRN
Status: DISCONTINUED | OUTPATIENT
Start: 2019-12-06 | End: 2019-12-06

## 2019-12-06 RX ADMIN — CEFAZOLIN SODIUM 2 G: 2 SOLUTION INTRAVENOUS at 11:50

## 2019-12-06 RX ADMIN — PROPOFOL 150 MCG/KG/MIN: 10 INJECTION, EMULSION INTRAVENOUS at 11:37

## 2019-12-06 RX ADMIN — PROPOFOL 200 MG: 10 INJECTION, EMULSION INTRAVENOUS at 11:37

## 2019-12-06 RX ADMIN — GABAPENTIN 300 MG: 300 CAPSULE ORAL at 10:28

## 2019-12-06 RX ADMIN — ACETAMINOPHEN 975 MG: 325 TABLET ORAL at 10:28

## 2019-12-06 RX ADMIN — FENTANYL CITRATE 50 MCG: 50 INJECTION, SOLUTION INTRAMUSCULAR; INTRAVENOUS at 11:37

## 2019-12-06 RX ADMIN — LIDOCAINE HYDROCHLORIDE 100 MG: 20 INJECTION, SOLUTION INFILTRATION; PERINEURAL at 11:37

## 2019-12-06 RX ADMIN — ONDANSETRON 4 MG: 2 INJECTION INTRAMUSCULAR; INTRAVENOUS at 11:55

## 2019-12-06 RX ADMIN — SODIUM CHLORIDE, SODIUM LACTATE, POTASSIUM CHLORIDE, CALCIUM CHLORIDE: 600; 310; 30; 20 INJECTION, SOLUTION INTRAVENOUS at 10:28

## 2019-12-06 RX ADMIN — FENTANYL CITRATE 50 MCG: 50 INJECTION, SOLUTION INTRAMUSCULAR; INTRAVENOUS at 11:54

## 2019-12-06 RX ADMIN — SODIUM CHLORIDE, SODIUM LACTATE, POTASSIUM CHLORIDE, CALCIUM CHLORIDE: 600; 310; 30; 20 INJECTION, SOLUTION INTRAVENOUS at 11:36

## 2019-12-06 ASSESSMENT — MIFFLIN-ST. JEOR: SCORE: 1282.46

## 2019-12-06 NOTE — ANESTHESIA CARE TRANSFER NOTE
Patient: Mona Wagner    Procedure(s):  CYSTOURETEROSCOPY, WITH RETROGRADE PYELOGRAM of transplant kidney, STENT INSERTION, Laser on standby    Diagnosis: Stricture or kinking of ureter [N13.5]  Diagnosis Additional Information: No value filed.    Anesthesia Type:   General     Note:  Airway :Face Mask  Patient transferred to:PACU  Handoff Report: Identifed the Patient, Identified the Reponsible Provider, Reviewed the pertinent medical history, Discussed the surgical course, Reviewed Intra-OP anesthesia mangement and issues during anesthesia, Set expectations for post-procedure period and Allowed opportunity for questions and acknowledgement of understanding      Vitals: (Last set prior to Anesthesia Care Transfer)    CRNA VITALS  12/6/2019 1207 - 12/6/2019 1242      12/6/2019             Pulse:  64    SpO2:  100 %    Resp Rate (observed):  15                Electronically Signed By: ALCIDES Mckeon CRNA  December 6, 2019  12:42 PM

## 2019-12-06 NOTE — OP NOTE
OPERATIVE REPORT    PREOPERATIVE DIAGNOSIS: Hydronephrosis of Transplanted Kidney    POSTOPERATIVE DIAGNOSIS:  Same    PROCEDURES PERFORMED:   1. Cystourethroscopy with retrograde pyelography of transplanted kidney  2. Ureteroscopy of transplanted ureter  2. Placement of ureteral stent for transplanted kidney  3. Intraoperative interpretation of fluoroscopic imaging.     STAFF SURGEON: Wally Britt MD    RESIDENT: Trav Pereira MD MS    ANESTHESIA: General    ESTIMATED BLOOD LOSS: 0 mL.     DRAINS: 6x22cm double J ureteral stent in transplanted ureter    COMPLICATIONS: None    FINDINGS:  1. Mild hydronephrosis of transplanted kidney  2. NO filling defects or strictures of transplanted ureter  3. Successful insertion of ureteral stent with good curl noted in transplanted kidney renal pelvis and bladder on fluorscopy    OPERATIVE INDICATIONS:   Mona Wagner is a 29 year old female who presented with hydronephrosis of her transplanted kidney. The patient was counseled on the alternatives, risks, and benefits and elected to proceed with the above stated procedure.    DESCRIPTION OF PROCEDURE:    After informed consent was obtained, the patient was taken to the operating room, and moved to the operating table.  After adequate anesthesia was induced, the patient was repositioned in dorsal lithotomy position and prepped and draped in the usual sterile fashion. A timeout was taken to confirm correct patient, procedure and laterality.     A 19-Anguillan cystoscope was inserted into a well lubricated urethra. The urethra was unremarkable.  The bladder was free of tumors, stones or diverticuli.  The media was clear.  Bilateral ureteral orifices were orthotopic. The transplanted ureteral orifice was noted on the right superior aspect of the bladder and was normal appearing. A Sensor guidewire was advanced into the transplanted renal pelvis with the aid of a 5-Anguillan open ended ureteral catheter. Next a 10 Anguillan dual  lumen catheter was advanced over the sensor wire. A retrograde pyelogram demonstrated mild hydronephrosis and no filling defects. A second wire was then placed via the 10 Kazakh dual lumen catheter to serve as a safety wire.    A flexible ureteroscope was then advanced over one of the wires up into the transplanted kidney. Systematic pyeloscopy revealed no stones, tumors, or other abnormalities in the renal pelvis. Pullback ureteroscopy was also clear of stones, strictures, or masses. After pullback ureteroscopy, a 6x22cm ureteral JJ stent was placed under direct visualization AND fluoroscopic guidance, with a good curl of the stent noted in the renal pelvis of the transplanted kidney and in the bladder.  The stent passed up easily with no appreciable resistance.  The bladder was then drained.    The patient tolerated the procedure well.  There were no complications.         PLAN:   - Patient was transferred to PACU and discharged to home when meeting PACU criteria  - Patient will follow-up at previously scheduled appointment with Dr. Britt on 12/20/2019. She should undergo a CT Abd/Pelvis prior to that appointment

## 2019-12-06 NOTE — ANESTHESIA POSTPROCEDURE EVALUATION
Anesthesia POST Procedure Evaluation    Patient: Mona Wagner   MRN:     [id number] Gender:   female   Age:    29 year old :      1990        Preoperative Diagnosis: Stricture or kinking of ureter [N13.5]   Procedure(s):  CYSTOURETEROSCOPY, WITH RETROGRADE PYELOGRAM of transplant kidney, STENT INSERTION, Laser on standby   Postop Comments: No value filed.       Anesthesia Type:  Not documented  General    Reportable Event: NO     PAIN: Uncomplicated   Sign Out status: Comfortable, Well controlled pain     PONV: No PONV   Sign Out status:  No Nausea or Vomiting     Neuro/Psych: Uneventful perioperative course   Sign Out Status: Preoperative baseline; Age appropriate mentation     Airway/Resp.: Uneventful perioperative course   Sign Out Status: Non labored breathing, age appropriate RR; Resp. Status within EXPECTED Parameters     CV: Uneventful perioperative course   Sign Out status: Appropriate BP and perfusion indices; Appropriate HR/Rhythm     Disposition:   Sign Out in:  PACU  Disposition:  Phase II; Home  Recovery Course: Uneventful  Follow-Up: Not required           Last Anesthesia Record Vitals:  CRNA VITALS  2019 1207 - 2019 1307      2019             Pulse:  64    SpO2:  100 %    Resp Rate (observed):  15    EKG:  Sinus rhythm          Last PACU Vitals:  Vitals Value Taken Time   /88 2019 12:55 PM   Temp 36.2  C (97.2  F) 2019 12:55 PM   Pulse     Resp 16 2019 12:55 PM   SpO2 100 % 2019 12:55 PM   Temp src     NIBP     Pulse     SpO2     Resp     Temp     Ht Rate     Temp 2           Electronically Signed By: Pierre Mcclure MD, 2019, 2:23 PM

## 2019-12-06 NOTE — DISCHARGE INSTRUCTIONS
[facility]  Home Care Following Anesthesia  For 24 hours after surgery:  1. Get plenty of rest.  A responsible adult must stay with you for at least 24 hours after you leave the surgery center.  2. Do not drive or use heavy equipment.  If you have weakness or tingling, don't drive or use heavy equipment until this feeling goes away.   3. Do not drink alcohol.   4. Avoid strenuous or risky activities.  Ask for help when climbing stairs.  5. You may feel lightheaded.  IF so, sit for a few minutes before standing.  Have someone help you get up.   6. If you have nausea (feel sick to your stomach): Drink only clear liquids such as apple juice, ginger ale, broth or 7-Up.  Rest may also help.  Be sure to drink enough fluids.  Move to a regular diet as you feel able.   7. You may have a slight fever.  Call the doctor if your fever is over 100 F (37.7 C) (taken under the tongue) or lasts longer than 24 hours.  8. You may have a dry mouth, a sore throat, muscle aches or trouble sleeping. These should go away after 24 hours.  9. Do not make important or legal decisions.               Tips for taking pain medications  To get the best pain relief possible, remember these points:    Take pain medications as directed, before pain becomes severe.    Pain medication can upset your stomach: taking it with food may help.    Constipation is a common side effect of pain medication. Drink plenty of  fluids.    Eat foods high in fiber. Take a stool softener if recommended by your doctor or pharmacist.    Do not drink alcohol, drive or operate machinery while taking pain medications.    Ask about other ways to control pain, such as with heat, ice or relaxation.    Tylenol/Acetaminophen Consumption  To help encourage the safe use of acetaminophen, the makers of TYLENOL  have lowered the maximum daily dose for single-ingredient Extra Strength TYLENOL  (acetaminophen) products sold in the [place] from 8  pills per day (4,000 mg) to 6 pills per day (3,000 mg). The dosing interval has also changed from 2 pills every 4-6 hours to 2 pills every 6 hours.    If you feel your pain relief is insufficient, you may take Tylenol/Acetaminophen in addition to your narcotic pain medication.     Be careful not to exceed 3,000 mg of Tylenol/Acetaminophen in a 24 hour period from all sources.    If you are taking extra strength Tylenol/acetaminophen (500 mg), the maximum dose is 6 tablets in 24 hours.    If you are taking regular strength acetaminophen (325 mg), the maximum dose is 9 tablets in 24 hours.    Call a doctor for any of the followin. Signs of infection (fever, growing tenderness at the surgery site, a large amount of drainage or bleeding, severe pain, foul-smelling drainage, redness, swelling).  2. It has been over 8 to 10 hours since surgery and you are still not able to urinate (pass water).  3. Headache for over 24 hours.  4. Numbness, tingling or weakness the day after surgery (if you had spinal anesthesia).  Your doctor is:  Dr. Wally Britt, Prostate and Urology: [phone]                    Or dial [phone] and ask for the resident on call for:  Prostate Urology  For emergency care, call the:  [place] [facility]:  [phone] (TTY for hearing impaired: [phone])

## 2019-12-07 DIAGNOSIS — N13.5 STRICTURE OR KINKING OF URETER: Primary | ICD-10-CM

## 2019-12-09 ENCOUNTER — TELEPHONE (OUTPATIENT)
Dept: PHARMACY | Facility: CLINIC | Age: 29
End: 2019-12-09

## 2019-12-09 DIAGNOSIS — Z94.0 KIDNEY REPLACED BY TRANSPLANT: ICD-10-CM

## 2019-12-09 DIAGNOSIS — Z94.0 KIDNEY TRANSPLANTED: ICD-10-CM

## 2019-12-09 DIAGNOSIS — Z94.0 KIDNEY REPLACED BY TRANSPLANT: Primary | ICD-10-CM

## 2019-12-09 DIAGNOSIS — Z79.899 LONG TERM USE OF DRUG: ICD-10-CM

## 2019-12-09 LAB
ALLOSURE DD-CFDNA: 0.22 %
ANION GAP SERPL CALCULATED.3IONS-SCNC: 4 MMOL/L (ref 3–14)
BASOPHILS # BLD AUTO: 0.1 10E9/L (ref 0–0.2)
BASOPHILS NFR BLD AUTO: 1.3 %
BUN SERPL-MCNC: 29 MG/DL (ref 7–30)
CALCIUM SERPL-MCNC: 9.9 MG/DL (ref 8.5–10.1)
CHLORIDE SERPL-SCNC: 109 MMOL/L (ref 94–109)
CO2 SERPL-SCNC: 26 MMOL/L (ref 20–32)
CREAT SERPL-MCNC: 1.08 MG/DL (ref 0.52–1.04)
DIFFERENTIAL METHOD BLD: ABNORMAL
EOSINOPHIL # BLD AUTO: 0.2 10E9/L (ref 0–0.7)
EOSINOPHIL NFR BLD AUTO: 3.4 %
ERYTHROCYTE [DISTWIDTH] IN BLOOD BY AUTOMATED COUNT: 11.8 % (ref 10–15)
GFR SERPL CREATININE-BSD FRML MDRD: 70 ML/MIN/{1.73_M2}
GLUCOSE SERPL-MCNC: 86 MG/DL (ref 70–99)
HCT VFR BLD AUTO: 33.9 % (ref 35–47)
HGB BLD-MCNC: 10.5 G/DL (ref 11.7–15.7)
IMM GRANULOCYTES # BLD: 0 10E9/L (ref 0–0.4)
IMM GRANULOCYTES NFR BLD: 0.6 %
LYMPHOCYTES # BLD AUTO: 0.5 10E9/L (ref 0.8–5.3)
LYMPHOCYTES NFR BLD AUTO: 9.7 %
MCH RBC QN AUTO: 29.2 PG (ref 26.5–33)
MCHC RBC AUTO-ENTMCNC: 31 G/DL (ref 31.5–36.5)
MCV RBC AUTO: 94 FL (ref 78–100)
MONOCYTES # BLD AUTO: 0.4 10E9/L (ref 0–1.3)
MONOCYTES NFR BLD AUTO: 9.2 %
NEUTROPHILS # BLD AUTO: 3.5 10E9/L (ref 1.6–8.3)
NEUTROPHILS NFR BLD AUTO: 75.8 %
NRBC # BLD AUTO: 0 10*3/UL
NRBC BLD AUTO-RTO: 0 /100
PLATELET # BLD AUTO: 168 10E9/L (ref 150–450)
POTASSIUM SERPL-SCNC: 4 MMOL/L (ref 3.4–5.3)
RBC # BLD AUTO: 3.59 10E12/L (ref 3.8–5.2)
SODIUM SERPL-SCNC: 139 MMOL/L (ref 133–144)
TACROLIMUS BLD-MCNC: 9.5 UG/L (ref 5–15)
TME LAST DOSE: NORMAL H
WBC # BLD AUTO: 4.7 10E9/L (ref 4–11)

## 2019-12-09 PROCEDURE — 80197 ASSAY OF TACROLIMUS: CPT | Performed by: INTERNAL MEDICINE

## 2019-12-09 PROCEDURE — 85025 COMPLETE CBC W/AUTO DIFF WBC: CPT | Performed by: INTERNAL MEDICINE

## 2019-12-09 PROCEDURE — 80048 BASIC METABOLIC PNL TOTAL CA: CPT | Performed by: INTERNAL MEDICINE

## 2019-12-09 NOTE — TELEPHONE ENCOUNTER
Anemia Management Note  SUBJECTIVE/OBJECTIVE:  Referred by Dr. Nakul Hill on 2019  Primary Diagnosis: Anemia in Chronic Kidney Disease (N18.3, D63.1)     Secondary Diagnosis:  Chronic Kidney Disease, Stage 3 (N18.3)   Kidney Tx: 2019  Hgb goal range:  9-10  Epo/Darbo: Aranesp  40 mcg  every two weeks for Hgb <10.  In clinic.   Iron regimen:  NA  Labs : 2020  Recent CHARLINE use, transfusion, IV iron: PO Iron and Aranesp   RX/TX plans :2020  No history of stroke, MI and blood clots or cancers  Contact:            Ok to leave message  per consent to communicate dated 05/15/2019                              OK to speak with Jt Aleman () per consent to communicate dated 05/15/2013    Anemia Latest Ref Rng & Units 2019 2019 2019 2019 2019 12/3/2019 2019   CHARLINE Dose - - - - - - - -   Hemoglobin 11.7 - 15.7 g/dL 10.8(L) 10.7(L) 10.3(L) 10.4(L) 10.6(L) 9.9(L) 10.5(L)   TSAT 15 - 46 % - - - - - 23 -   Ferritin 12 - 150 ng/mL - - - - - 1,003(H) -     BP Readings from Last 3 Encounters:   19 123/85   19 (!) 136/90   19 (!) 137/93     Wt Readings from Last 2 Encounters:   19 62.6 kg (138 lb)   19 58.1 kg (128 lb 1.6 oz)           ASSESSMENT:  Hgb:Above goal - recommend hold dose  TSat: not at goal (>30%) but ferritin >1000ng/mL.  PO iron not indicated at this time per anemia protocol.   Ferritin: Elevated (>1000ng/mL)    PLAN:  Hold Aranesp and RTC for hgb then aranesp if needed in 3 week(s).  Labs are scheduled for 19.    Orders needed to be renewed (for next follow-up date) in EPIC: None    Iron labs due:  2019    Plan discussed with:  Mona  Plan provided by:  solomon    NEXT FOLLOW-UP DATE:  2019    Manjula Mckinnon RN   Anemia Services  [address] [address]   [email or web address]   Office : [phone]  Fax: [phone]

## 2019-12-14 ENCOUNTER — ANCILLARY PROCEDURE (OUTPATIENT)
Dept: CT IMAGING | Facility: CLINIC | Age: 29
End: 2019-12-14
Attending: UROLOGY
Payer: MEDICARE

## 2019-12-14 DIAGNOSIS — N13.5 STRICTURE OR KINKING OF URETER: ICD-10-CM

## 2019-12-14 RX ORDER — IOPAMIDOL 755 MG/ML
85 INJECTION, SOLUTION INTRAVASCULAR ONCE
Status: COMPLETED | OUTPATIENT
Start: 2019-12-14 | End: 2019-12-14

## 2019-12-14 RX ADMIN — IOPAMIDOL 85 ML: 755 INJECTION, SOLUTION INTRAVASCULAR at 11:29

## 2019-12-14 NOTE — PROGRESS NOTES
"Ms. Wagner is a 29 year old female kidney transplant patient presenting to the [facility] radiology department for a CT scan with contrast, and after the injection of contrast she immediately complained of swelling and itching to her left eye. I was called to the CT scanner to evaluate her. On arrival she was resting comfortably and breathing easily on room air while laying on the CT scanning table. She denied shortness of breath, throat swelling, difficulty speaking or breathing. She described feeling like her left eye was mildly swollen and itchy. Although she has previously had contrast with a fistulogram, she denies any history of previous symptoms.    General: no apparent distress, well appearing female  HEENT: no visible swelling of either eye, no eye watering  Skin: No hives  Vascular: left arm dialysis fistula, right peripheral IV catheter  Heart: regular rate and rhythm, strong right radial pulse  Lungs: breathing easily on room air, no audible wheezing  Neuro: Normal gait without assistance, AOx4, no gross focal deficit    A/P: 28 y/o female with a mild contrast reaction, most likely physiologic given she has received contrast previously. However, a mild allergic type reaction is not entirely excluded. A \"contrast allergy\" has been added to her chart to alert her providers of today's reaction, so they can consider premedication with steroids prior to future contrast enhanced exams. Today, the patient was given 25 mg of PO benadryl after confirming she had a  and counseling the medication may make her drowsy. She was observed for 20 minutes in the radiology department, and her symptoms were noted to be much improved. She was discharged home with the assistance of her friend/family member, and instructed to return to the emergency department should her symptoms return or worsen in any way (although this is considered unlikely to occur).    Mikhail Malcolm MD on 12/14/2019 at 12:18 PM    "

## 2019-12-16 DIAGNOSIS — Z94.0 KIDNEY REPLACED BY TRANSPLANT: Primary | ICD-10-CM

## 2019-12-16 DIAGNOSIS — Z94.0 KIDNEY TRANSPLANTED: ICD-10-CM

## 2019-12-16 DIAGNOSIS — Z94.0 KIDNEY REPLACED BY TRANSPLANT: ICD-10-CM

## 2019-12-16 DIAGNOSIS — Z79.899 LONG TERM USE OF DRUG: ICD-10-CM

## 2019-12-16 LAB
ANION GAP SERPL CALCULATED.3IONS-SCNC: 6 MMOL/L (ref 3–14)
BASOPHILS # BLD AUTO: 0 10E9/L (ref 0–0.2)
BASOPHILS NFR BLD AUTO: 0.9 %
BUN SERPL-MCNC: 40 MG/DL (ref 7–30)
CALCIUM SERPL-MCNC: 9.9 MG/DL (ref 8.5–10.1)
CHLORIDE SERPL-SCNC: 109 MMOL/L (ref 94–109)
CO2 SERPL-SCNC: 23 MMOL/L (ref 20–32)
CREAT SERPL-MCNC: 1.21 MG/DL (ref 0.52–1.04)
DIFFERENTIAL METHOD BLD: ABNORMAL
EOSINOPHIL # BLD AUTO: 0.2 10E9/L (ref 0–0.7)
EOSINOPHIL NFR BLD AUTO: 3.7 %
ERYTHROCYTE [DISTWIDTH] IN BLOOD BY AUTOMATED COUNT: 11.7 % (ref 10–15)
GFR SERPL CREATININE-BSD FRML MDRD: 61 ML/MIN/{1.73_M2}
GLUCOSE SERPL-MCNC: 90 MG/DL (ref 70–99)
HCT VFR BLD AUTO: 35.1 % (ref 35–47)
HGB BLD-MCNC: 10.8 G/DL (ref 11.7–15.7)
IMM GRANULOCYTES # BLD: 0 10E9/L (ref 0–0.4)
IMM GRANULOCYTES NFR BLD: 0.7 %
LYMPHOCYTES # BLD AUTO: 0.4 10E9/L (ref 0.8–5.3)
LYMPHOCYTES NFR BLD AUTO: 8.8 %
MCH RBC QN AUTO: 29.2 PG (ref 26.5–33)
MCHC RBC AUTO-ENTMCNC: 30.8 G/DL (ref 31.5–36.5)
MCV RBC AUTO: 95 FL (ref 78–100)
MONOCYTES # BLD AUTO: 0.4 10E9/L (ref 0–1.3)
MONOCYTES NFR BLD AUTO: 9 %
NEUTROPHILS # BLD AUTO: 3.3 10E9/L (ref 1.6–8.3)
NEUTROPHILS NFR BLD AUTO: 76.9 %
NRBC # BLD AUTO: 0 10*3/UL
NRBC BLD AUTO-RTO: 0 /100
PLATELET # BLD AUTO: 164 10E9/L (ref 150–450)
POTASSIUM SERPL-SCNC: 4.7 MMOL/L (ref 3.4–5.3)
RBC # BLD AUTO: 3.7 10E12/L (ref 3.8–5.2)
SODIUM SERPL-SCNC: 137 MMOL/L (ref 133–144)
TACROLIMUS BLD-MCNC: 9.2 UG/L (ref 5–15)
TME LAST DOSE: NORMAL H
WBC # BLD AUTO: 4.3 10E9/L (ref 4–11)

## 2019-12-16 PROCEDURE — 80048 BASIC METABOLIC PNL TOTAL CA: CPT | Performed by: INTERNAL MEDICINE

## 2019-12-16 PROCEDURE — 85025 COMPLETE CBC W/AUTO DIFF WBC: CPT | Performed by: INTERNAL MEDICINE

## 2019-12-16 PROCEDURE — 80197 ASSAY OF TACROLIMUS: CPT | Performed by: INTERNAL MEDICINE

## 2019-12-17 ENCOUNTER — TELEPHONE (OUTPATIENT)
Dept: TRANSPLANT | Facility: CLINIC | Age: 29
End: 2019-12-17

## 2019-12-17 DIAGNOSIS — E87.20 METABOLIC ACIDOSIS: ICD-10-CM

## 2019-12-17 DIAGNOSIS — Z94.0 KIDNEY REPLACED BY TRANSPLANT: Primary | ICD-10-CM

## 2019-12-17 RX ORDER — SODIUM BICARBONATE 650 MG/1
TABLET ORAL
Qty: 180 TABLET | Refills: 0 | Status: SHIPPED | OUTPATIENT
Start: 2019-12-17 | End: 2020-01-14

## 2019-12-17 NOTE — TELEPHONE ENCOUNTER
General health check in.  Rise in creatinine after retrograde stent placement and then CT contrast.     Mona reports that she has pain at the kidney similar to when she first experienced the pain, which prompted the initial US showing hydronephrosis.    She has follow up with urology on Friday.     Due for labs in 2 weeks, will instead recheck labs on Friday.   Lab appointment made for 11:30 AM.    RNCC advised that Mona hydrate will with recent contrast dye.  She voiced understanding.

## 2019-12-18 ENCOUNTER — TELEPHONE (OUTPATIENT)
Dept: TRANSPLANT | Facility: CLINIC | Age: 29
End: 2019-12-18

## 2019-12-18 DIAGNOSIS — Z94.0 KIDNEY REPLACED BY TRANSPLANT: Primary | ICD-10-CM

## 2019-12-18 NOTE — TELEPHONE ENCOUNTER
Post Kidney and Pancreas Transplant Team Conference  Date: 12/18/2019  Transplant Coordinator: Laurence Vazquez     Attendees:  [x]  Dr. Hill  [x] Leonela Brewster LPN     []  Dr. Wilson [x] Cinthia Cox, ERMELINDA [] Kiersten Chang LPN   []  Dr. Rios [] Gely Lynn, RN     [] Keeley Li RN [] Anthony Hendricks, PharmD   [] Dr. Johnson [x] Melani Vazquez, ERMELINDA    [x] Dr. Durbin [] Virgilio Ryan RN    [] Dr. Pinto [] Ophelia Oneal, ERMELINDA    [] Dr. Delgadillo [] Yesika Wong RN     [] Melanie Ayala RN    [] Surgery Fellow [x] Nancy Maldonado RN    [] Johana Linda NP [] Nathalia Moreno RN        Verbal Plan Read Back:   Ok to increase Florinef to daily  Write note to provider to see why pt received IV contrast w/ CT scan (not recommended by Dr. Durbin)  UA needed      Routed to RN Coordinator   СЕРГЕЙ Dominguez voiced understanding to increase florinef to daily, 0.1mg.  If >130 let SOT know.    Mona denies any s/s of UTI other than discomfort from stent placement, similar to when previous stent was in place.    Orders entered for UA / UC. Will obtain on Friday.  If s/s of UTI present (s/s reviewed with the patient), notify RNCC and we will obtain Ua / UC earlier.

## 2019-12-20 ENCOUNTER — OFFICE VISIT (OUTPATIENT)
Dept: UROLOGY | Facility: CLINIC | Age: 29
End: 2019-12-20
Payer: MEDICARE

## 2019-12-20 VITALS
WEIGHT: 138 LBS | DIASTOLIC BLOOD PRESSURE: 72 MMHG | BODY MASS INDEX: 27.09 KG/M2 | HEART RATE: 89 BPM | HEIGHT: 60 IN | SYSTOLIC BLOOD PRESSURE: 125 MMHG

## 2019-12-20 DIAGNOSIS — Z94.0 KIDNEY REPLACED BY TRANSPLANT: ICD-10-CM

## 2019-12-20 DIAGNOSIS — Z94.0 KIDNEY REPLACED BY TRANSPLANT: Primary | ICD-10-CM

## 2019-12-20 DIAGNOSIS — Z79.899 LONG TERM USE OF DRUG: ICD-10-CM

## 2019-12-20 DIAGNOSIS — N13.39 OTHER HYDRONEPHROSIS: Primary | ICD-10-CM

## 2019-12-20 LAB
ALBUMIN UR-MCNC: NEGATIVE MG/DL
ANION GAP SERPL CALCULATED.3IONS-SCNC: 3 MMOL/L (ref 3–14)
APPEARANCE UR: CLEAR
BACTERIA #/AREA URNS HPF: ABNORMAL /HPF
BILIRUB UR QL STRIP: NEGATIVE
BUN SERPL-MCNC: 39 MG/DL (ref 7–30)
CALCIUM SERPL-MCNC: 9.7 MG/DL (ref 8.5–10.1)
CHLORIDE SERPL-SCNC: 108 MMOL/L (ref 94–109)
CO2 SERPL-SCNC: 27 MMOL/L (ref 20–32)
COLOR UR AUTO: ABNORMAL
CREAT SERPL-MCNC: 1.22 MG/DL (ref 0.52–1.04)
ERYTHROCYTE [DISTWIDTH] IN BLOOD BY AUTOMATED COUNT: 11.6 % (ref 10–15)
GFR SERPL CREATININE-BSD FRML MDRD: 61 ML/MIN/{1.73_M2}
GLUCOSE SERPL-MCNC: 83 MG/DL (ref 70–99)
GLUCOSE UR STRIP-MCNC: NEGATIVE MG/DL
HCT VFR BLD AUTO: 34.9 % (ref 35–47)
HGB BLD-MCNC: 10.7 G/DL (ref 11.7–15.7)
HGB UR QL STRIP: ABNORMAL
KETONES UR STRIP-MCNC: NEGATIVE MG/DL
LEUKOCYTE ESTERASE UR QL STRIP: ABNORMAL
MCH RBC QN AUTO: 29.1 PG (ref 26.5–33)
MCHC RBC AUTO-ENTMCNC: 30.7 G/DL (ref 31.5–36.5)
MCV RBC AUTO: 95 FL (ref 78–100)
NITRATE UR QL: NEGATIVE
PH UR STRIP: 7 PH (ref 5–7)
PLATELET # BLD AUTO: 167 10E9/L (ref 150–450)
POTASSIUM SERPL-SCNC: 4.8 MMOL/L (ref 3.4–5.3)
RBC # BLD AUTO: 3.68 10E12/L (ref 3.8–5.2)
RBC #/AREA URNS AUTO: 2 /HPF (ref 0–2)
SODIUM SERPL-SCNC: 138 MMOL/L (ref 133–144)
SOURCE: ABNORMAL
SP GR UR STRIP: 1.01 (ref 1–1.03)
SQUAMOUS #/AREA URNS AUTO: 2 /HPF (ref 0–1)
UROBILINOGEN UR STRIP-MCNC: 0 MG/DL (ref 0–2)
WBC # BLD AUTO: 3.9 10E9/L (ref 4–11)
WBC #/AREA URNS AUTO: 3 /HPF (ref 0–5)

## 2019-12-20 RX ORDER — ALBUTEROL SULFATE 0.83 MG/ML
2.5 SOLUTION RESPIRATORY (INHALATION) ONCE
Status: CANCELLED | OUTPATIENT
Start: 2020-01-14

## 2019-12-20 RX ORDER — ALBUTEROL SULFATE 0.83 MG/ML
2.5 SOLUTION RESPIRATORY (INHALATION) ONCE
Status: COMPLETED | OUTPATIENT
Start: 2019-12-20 | End: 2019-12-20

## 2019-12-20 RX ORDER — PENTAMIDINE ISETHIONATE 300 MG/300MG
300 INHALANT RESPIRATORY (INHALATION) ONCE
Status: CANCELLED | OUTPATIENT
Start: 2020-01-14

## 2019-12-20 RX ORDER — PENTAMIDINE ISETHIONATE 300 MG/300MG
300 INHALANT RESPIRATORY (INHALATION) ONCE
Status: COMPLETED | OUTPATIENT
Start: 2019-12-20 | End: 2019-12-20

## 2019-12-20 RX ADMIN — ALBUTEROL SULFATE 2.5 MG: 0.83 SOLUTION RESPIRATORY (INHALATION) at 12:53

## 2019-12-20 RX ADMIN — PENTAMIDINE ISETHIONATE 300 MG: 300 INHALANT RESPIRATORY (INHALATION) at 12:54

## 2019-12-20 ASSESSMENT — MIFFLIN-ST. JEOR: SCORE: 1282.46

## 2019-12-20 ASSESSMENT — PAIN SCALES - GENERAL: PAINLEVEL: NO PAIN (0)

## 2019-12-20 NOTE — PATIENT INSTRUCTIONS
Follow up with Dr. Britt in 6-8 weeks.      It was a pleasure meeting with you today.  Thank you for allowing me and my team the privilege of caring for you today.  YOU are the reason we are here, and I truly hope we provided you with the excellent service you deserve.  Please let us know if there is anything else we can do for you so that we can be sure you are leaving completely satisfied with your care experience.

## 2019-12-20 NOTE — LETTER
2019       RE: Mona Wagner  [address]  [place] [address]     Dear Colleague,    Thank you for referring your patient, Mona Wagner, to the [facility] UROLOGY AND INST FOR PROSTATE AND UROLOGIC CANCERS at [facility]. Please see a copy of my visit note below.    Urology Clinic    Wally Britt MD  Date of Service: 2019     Name: Mona Wagner  MRN: [id number]  Age: 29 year old  : 1990  Referring provider: Macarena Bowen     Assessment and Plan:  Assessment:  Mona Wagner is a 29 year old female with right hydronephrosis in her transplant kidney with ureteral stent in place. Hydronephrosis is improved on CT but creatinine remains elevated.     Plan:  Follow up in 6-8 weeks with creatinine. I will consult with Dr. Hill to discuss stent removal.     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 She developed right hydronephrosis in her transplant kidney and is s/p cystoscopy, ureteroscopy, and ureteral stent placement on 2019. There were no filling defects or strictures of the transplanted ureter.      She was making no urine prior to transplant but is now making a lot of urine. She is having irregular spotting.     Today she reports that she had discomfort with the stent for the first week but is now tolerating it better. She continues to have some right lower quadrant abdominal pain.     Review of Systems:   Pertinent items are noted in HPI or as below, remainder of complete ROS is negative.      Physical Exam:   /72   Pulse 89   Ht 1.524 m (5')   Wt 62.6 kg (138 lb)   LMP 2019 (Approximate)   BMI 26.95 kg/m     Constitutional: Alert, no acute distress  Psychiatric: Normal mood and affect  Gastrointestinal: Abdomen soft, nontender.   :  Deferred    Laboratory:   I personally reviewed all applicable laboratory data and went over findings with patient  Significant for:    CBC RESULTS:  Recent Labs   Lab Test 12/16/19  0840 12/09/19  0841 12/03/19  1112 12/02/19  0850   WBC 4.3 4.7 4.3 4.1   HGB 10.8* 10.5* 9.9* 10.6*    168 162 188     BMP RESULTS:  Recent Labs   Lab Test 12/16/19  0840 12/09/19  0841 12/03/19  1112 12/02/19  0850    139 139 140   POTASSIUM 4.7 4.0 3.6 3.9   CHLORIDE 109 109 108 112*   CO2 23 26 27 21   ANIONGAP 6 4 4 7   GLC 90 86 79 93   BUN 40* 29 29 30   CR 1.21* 1.08* 1.33* 1.22*   GFRESTIMATED 61 70 55* 61   GFRESTBLACK 71 81 63 70   SHAMIR 9.9 9.9 9.6 9.8     UA RESULTS:   Recent Labs   Lab Test 12/03/19  1215 11/22/19  1055 11/01/19  1148   SG 1.010 1.012 1.010   URINEPH 8.0* 7.0 8.0*   NITRITE Negative Negative Negative   RBCU 1 <1 <1   WBCU 1 1 <1       Imaging:   I personally reviewed all applicable imaging and went over the below findings with patient.    Results for orders placed or performed in visit on 12/14/19   CT Abdomen Pelvis w Contrast    Narrative    EXAMINATION: CT ABDOMEN PELVIS W CONTRAST, 12/14/2019 11:52 AM    TECHNIQUE:  Helical CT images from the lung bases through the  symphysis pubis were obtained with IV contrast. Contrast dose: Isovue  370 85ml    COMPARISON: Ultrasound 12/3/2019; CT exam 8/2/2019.    HISTORY: eval stricture of kidney and kidney; Stricture or kinking of  ureter    FINDINGS:    Lung bases: Cardiac size within normal limits. Small pericardial  effusion. No pleural effusions. Mild bilateral lower lobes dependent  atelectasis.    Abdomen and pelvis: No focal liver lesions. Patent hepatic  vasculature. No biliary tree dilation. Partially distended  gallbladder. Homogeneous pancreas. Main pancreatic duct is not  dilated. The spleen is not enlarged. No focal splenic lesions.  Unremarkable adrenal glands. No abdominal aortic aneurysm.  Fat-containing umbilical hernia. No free air.  No dilated loops of  bowel. No bowel wall thickening. No inguinal lymphadenopathy.  Follicular appearance of the ovaries. Prominent pelvic lymph nodes,  likely reactive. Subcentimeter retroperitoneal and mesenteric lymph  nodes are not enlarged by size criteria. Soft tissue nodule in the  left upper quadrant, presumably splenule.    Markedly atrophic native kidneys with no hydronephrosis. No renal  stones. Right lower quadrant renal transplant with mild perinephric  fluid/fat stranding. Mild right lower quadrant renal transplant  pyelocalyceal dilation with prominent ureter. New nephroureteral stent  in place. Urothelial thickening with enhancement likely due to post  procedure/inflammatory changes. Mild periureteral fat stranding.  Decompressed urinary bladder. Postoperative changes along the right  lower quadrant abdominal wall. Fat-containing umbilical hernia.  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    Bones: Scattered Schmorl nodes. No aggressive bone lesions. Diffuse  increased attenuation throughout the bones most consistent with renal  osteodystrophy. Mild lumbar curvature with convexity to the left.      Impression    IMPRESSION:  1. Right lower quadrant renal transplant with mild pyelocalyceal  dilation and prominent ureter, improved from the prior study. New  nephroureteral stent in place. Urothelial thickening with enhancement  likely due to post procedure/inflammatory changes. Mild periureteral  fat stranding. Mild perinephric fluid/fat stranding along the right  lower quadrant renal transplant.  2. Atrophic native kidneys with no hydronephrosis, stones or  suspicious masses in the present study.  3. Diffuse increased attenuation throughout the bones most consistent  with renal osteodystrophy.  4. Small pericardial effusion.  5. 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 Consider further evaluation with pelvic MRI for  better  characterization.    ANA MARÍA BECERRA MD       Scribe Disclosure:  I, Yahaira Lia, am serving as a scribe to document services personally performed by Wally Britt MD at this visit, based upon the provider's statements to me. All documentation has been reviewed by the aforementioned provider prior to being entered into the official medical record.     Melba Lia served as the scribe for this patient's visit and documented my history and physical exam.  I performed the history and physical exam.  I have edited and agree with the note.  MARCUS Britt MD    Again, thank you for allowing me to participate in the care of your patient.      Sincerely,    Wally Britt MD

## 2019-12-20 NOTE — PROGRESS NOTES
Urology Clinic    Wally Britt MD  Date of Service: 2019     Name: Mona Wagner  MRN: [id number]  Age: 29 year old  : 1990  Referring provider: Macarena Bowen     Assessment and Plan:  Assessment:  Mona Wagner is a 29 year old female with right hydronephrosis in her transplant kidney with ureteral stent in place. Hydronephrosis is improved on CT but creatinine remains elevated.     Plan:  Follow up in 6-8 weeks with creatinine. I will consult with Dr. Hill to discuss stent removal.     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 She developed right hydronephrosis in her transplant kidney and is s/p cystoscopy, ureteroscopy, and ureteral stent placement on 2019. There were no filling defects or strictures of the transplanted ureter.      She was making no urine prior to transplant but is now making a lot of urine. She is having irregular spotting.     Today she reports that she had discomfort with the stent for the first week but is now tolerating it better. She continues to have some right lower quadrant abdominal pain.     Review of Systems:   Pertinent items are noted in HPI or as below, remainder of complete ROS is negative.      Physical Exam:   /72   Pulse 89   Ht 1.524 m (5')   Wt 62.6 kg (138 lb)   LMP 2019 (Approximate)   BMI 26.95 kg/m    Constitutional: Alert, no acute distress  Psychiatric: Normal mood and affect  Gastrointestinal: Abdomen soft, nontender.   : Deferred    Laboratory:   I personally reviewed all applicable laboratory data and went over findings with patient  Significant for:    CBC RESULTS:  Recent Labs   Lab Test 19  0840 19  0841 19  1112 19  0850   WBC 4.3 4.7 4.3 4.1   HGB 10.8* 10.5* 9.9* 10.6*    168 162 188     BMP  RESULTS:  Recent Labs   Lab Test 12/16/19  0840 12/09/19  0841 12/03/19  1112 12/02/19  0850    139 139 140   POTASSIUM 4.7 4.0 3.6 3.9   CHLORIDE 109 109 108 112*   CO2 23 26 27 21   ANIONGAP 6 4 4 7   GLC 90 86 79 93   BUN 40* 29 29 30   CR 1.21* 1.08* 1.33* 1.22*   GFRESTIMATED 61 70 55* 61   GFRESTBLACK 71 81 63 70   SHAMIR 9.9 9.9 9.6 9.8     UA RESULTS:   Recent Labs   Lab Test 12/03/19  1215 11/22/19  1055 11/01/19  1148   SG 1.010 1.012 1.010   URINEPH 8.0* 7.0 8.0*   NITRITE Negative Negative Negative   RBCU 1 <1 <1   WBCU 1 1 <1       Imaging:   I personally reviewed all applicable imaging and went over the below findings with patient.    Results for orders placed or performed in visit on 12/14/19   CT Abdomen Pelvis w Contrast    Narrative    EXAMINATION: CT ABDOMEN PELVIS W CONTRAST, 12/14/2019 11:52 AM    TECHNIQUE:  Helical CT images from the lung bases through the  symphysis pubis were obtained with IV contrast. Contrast dose: Isovue  370 85ml    COMPARISON: Ultrasound 12/3/2019; CT exam 8/2/2019.    HISTORY: eval stricture of kidney and kidney; Stricture or kinking of  ureter    FINDINGS:    Lung bases: Cardiac size within normal limits. Small pericardial  effusion. No pleural effusions. Mild bilateral lower lobes dependent  atelectasis.    Abdomen and pelvis: No focal liver lesions. Patent hepatic  vasculature. No biliary tree dilation. Partially distended  gallbladder. Homogeneous pancreas. Main pancreatic duct is not  dilated. The spleen is not enlarged. No focal splenic lesions.  Unremarkable adrenal glands. No abdominal aortic aneurysm.  Fat-containing umbilical hernia. No free air. No dilated loops of  bowel. No bowel wall thickening. No inguinal lymphadenopathy.  Follicular appearance of the ovaries. Prominent pelvic lymph nodes,  likely reactive. Subcentimeter retroperitoneal and mesenteric lymph  nodes are not enlarged by size criteria. Soft tissue nodule in the  left upper quadrant,  presumably splenule.    Markedly atrophic native kidneys with no hydronephrosis. No renal  stones. Right lower quadrant renal transplant with mild perinephric  fluid/fat stranding. Mild right lower quadrant renal transplant  pyelocalyceal dilation with prominent ureter. New nephroureteral stent  in place. Urothelial thickening with enhancement likely due to post  procedure/inflammatory changes. Mild periureteral fat stranding.  Decompressed urinary bladder. Postoperative changes along the right  lower quadrant abdominal wall. Fat-containing umbilical hernia.  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    Bones: Scattered Schmorl nodes. No aggressive bone lesions. Diffuse  increased attenuation throughout the bones most consistent with renal  osteodystrophy. Mild lumbar curvature with convexity to the left.      Impression    IMPRESSION:  1. Right lower quadrant renal transplant with mild pyelocalyceal  dilation and prominent ureter, improved from the prior study. New  nephroureteral stent in place. Urothelial thickening with enhancement  likely due to post procedure/inflammatory changes. Mild periureteral  fat stranding. Mild perinephric fluid/fat stranding along the right  lower quadrant renal transplant.  2. Atrophic native kidneys with no hydronephrosis, stones or  suspicious masses in the present study.  3. Diffuse increased attenuation throughout the bones most consistent  with renal osteodystrophy.  4. Small pericardial effusion.  5. 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 Consider further evaluation with pelvic MRI for better  characterization.    ANA MARÍA BECERRA MD       Scribe Disclosure:  Yahaira HAYNES, am serving as a scribe to document services personally performed by Wally Britt MD at this visit, based upon the provider's statements to me. All documentation has been reviewed by the aforementioned provider prior to  being entered into the official medical record.     Melba Fitzgerald served as the scribe for this patient's visit and documented my history and physical exam.  I performed the history and physical exam.  I have edited and agree with the note.  MARCUS Britt MD

## 2019-12-20 NOTE — PROGRESS NOTES
Patient was seen today for a Pentamidine nebulizer tx ordered by Ondina Deleon.    Patient was first given Albuterol 2.5 mg nebulizer, after which Pentamidine 300 mg mixed with 6cc Sterile H2O was administered through a filtered nebulizer.    No adverse side effects noted by the patient.    Pre-Treatment: SpO2 = 100%     HR = 84  Post-Treatment: SpO2 = 100%     HR = 94    Procedure was completed today by Rajiv Robbins, RRT, RPFT

## 2019-12-20 NOTE — NURSING NOTE
Chief Complaint   Patient presents with     Follow Up     stent, Iga nephropathy, S/P left kidney transplant       Blood pressure 125/72, pulse 89, height 1.524 m (5'), weight 62.6 kg (138 lb), last menstrual period 11/27/2019, not currently breastfeeding. Body mass index is 26.95 kg/m .    Patient Active Problem List   Diagnosis     DVT of upper extremity (deep vein thrombosis) (H)     Seizure disorder (H)     Other general symptoms(780.99)     Galactorrhea     Acquired hypothyroidism     Anemia in chronic kidney disease     Increased prolactin level (H)     Hypomagnesemia     Normocytic anemia     Thrombocytopenia (H)     Infective endocarditis     Aftercare following organ transplant     Immunosuppression (H)     Methemoglobinemia     Kidney replaced by transplant     Anxiety     Secondary renal hyperparathyroidism (H)     Vitamin D deficiency     CKD (chronic kidney disease) stage 3, GFR 30-59 ml/min (H)     Anemia of chronic renal failure, stage 3 (moderate) (H)     Stricture or kinking of ureter       Allergies   Allergen Reactions     Contrast Dye Rash     CT contrast allergy 12/14/19 rash over eyes. Need to have pre medication before a CT WITH CONTRAST      Chlorhexidine Rash     Rash at site     Sulfa Drugs Rash     Muscle stiffness of neck     Dapsone      Methemoglobinemia     Furosemide Other (See Comments)     Skin flushing     Lisinopril Swelling     angioedema       Current Outpatient Medications   Medication Sig Dispense Refill     acetaminophen (TYLENOL) 325 MG tablet Take 2 tablets (650 mg) by mouth every 4 hours as needed for mild pain 100 tablet 3     aspirin (ASA) 81 MG chewable tablet Take 1 tablet (81 mg) by mouth daily 30 tablet 5     atorvastatin (LIPITOR) 10 MG tablet Take 1 tablet (10 mg) by mouth daily 90 tablet 0     cinacalcet (SENSIPAR) 30 MG tablet Take 1 tablet (30 mg) by mouth daily (Patient taking differently: Take 30 mg by mouth every evening ) 30 tablet 2     diphenhydrAMINE  (BENADRYL) 25 MG tablet Take 1-2 tablets (25-50 mg) by mouth every 6 hours as needed for itching or allergies 60 tablet 1     EPINEPHrine (EPIPEN/ADRENACLICK/OR ANY BX GENERIC EQUIV) 0.3 MG/0.3ML injection 2-pack 0.3 mg       fludrocortisone (FLORINEF) 0.1 MG tablet Take 1 tablet (0.1 mg) by mouth twice a week 180 tablet 3     hydrOXYzine (ATARAX) 10 MG tablet Take 1 tablet (10 mg) by mouth 3 times daily as needed for anxiety 20 tablet 0     levothyroxine (SYNTHROID/LEVOTHROID) 25 MCG tablet Take 1 tablet (25 mcg) Tuesday, Thursday, Saturday and Sunday and 1.5 tablets (37.5 mcg) on Monday, Wednesday and Friday every week. 105 tablet 1     magnesium oxide (MAG-OX) 400 MG tablet Take 1 tablet (400 mg) by mouth 2 times daily 180 tablet 0     multivitamin RENAL (NEPHROCAPS/TRIPHROCAPS) 1 MG capsule Take 1 capsule by mouth daily 30 capsule 2     MYFORTIC (BRAND) 180 MG EC tablet Take 3 tablets (540 mg) by mouth 2 times daily 180 tablet 11     norethindrone (MICRONOR) 0.35 MG tablet Do not restart until your follow up appointment with your surgeon. Discuss restart date at that appointment. 84 tablet 3     oxyCODONE (ROXICODONE) 5 MG tablet Take 1-2 tablets (5-10 mg) by mouth every 4 hours as needed for moderate to severe pain 6 tablet 0     pentamidine (NEBUPENT) 300 MG neb solution Inhale 300 mg into the lungs every 28 days       psyllium (METAMUCIL/KONSYL) capsule Take 1 capsule by mouth daily 90 capsule 3     simethicone (MYLICON) 125 MG chewable tablet Take 1 tablet (125 mg) by mouth 4 times daily as needed for intestinal gas       sodium bicarbonate 650 MG tablet TAKE 3 TABLETS(1950 MG) BY MOUTH TWICE DAILY 180 tablet 0     tacrolimus (GENERIC EQUIVALENT) 0.5 MG capsule HOLD 60 capsule 11     tacrolimus (GENERIC EQUIVALENT) 1 MG capsule Total dose = 7 mg  capsule 11     tacrolimus (GENERIC EQUIVALENT) 5 MG capsule Take 1 capsule (5 mg) by mouth 2 times daily Total dose = 7 mg BID 60 capsule 11     tamsulosin  (FLOMAX) 0.4 MG capsule Take 1 capsule (0.4 mg) by mouth daily 20 capsule 0       Social History     Tobacco Use     Smoking status: Never Smoker     Smokeless tobacco: Never Used   Substance Use Topics     Alcohol use: No     Alcohol/week: 0.0 standard drinks     Drug use: No       Ela Godoy LPN  12/20/2019  1:30 PM

## 2019-12-30 ENCOUNTER — TELEPHONE (OUTPATIENT)
Dept: PHARMACY | Facility: CLINIC | Age: 29
End: 2019-12-30

## 2019-12-30 DIAGNOSIS — Z94.0 KIDNEY REPLACED BY TRANSPLANT: Primary | ICD-10-CM

## 2019-12-30 DIAGNOSIS — Z79.899 LONG TERM USE OF DRUG: ICD-10-CM

## 2019-12-30 DIAGNOSIS — Z94.0 KIDNEY REPLACED BY TRANSPLANT: ICD-10-CM

## 2019-12-30 LAB
ANION GAP SERPL CALCULATED.3IONS-SCNC: 7 MMOL/L (ref 3–14)
BUN SERPL-MCNC: 34 MG/DL (ref 7–30)
CALCIUM SERPL-MCNC: 9.8 MG/DL (ref 8.5–10.1)
CHLORIDE SERPL-SCNC: 110 MMOL/L (ref 94–109)
CO2 SERPL-SCNC: 22 MMOL/L (ref 20–32)
CREAT SERPL-MCNC: 1.15 MG/DL (ref 0.52–1.04)
ERYTHROCYTE [DISTWIDTH] IN BLOOD BY AUTOMATED COUNT: 11.6 % (ref 10–15)
GFR SERPL CREATININE-BSD FRML MDRD: 65 ML/MIN/{1.73_M2}
GLUCOSE SERPL-MCNC: 93 MG/DL (ref 70–99)
HCT VFR BLD AUTO: 33.8 % (ref 35–47)
HGB BLD-MCNC: 10.7 G/DL (ref 11.7–15.7)
MCH RBC QN AUTO: 28.8 PG (ref 26.5–33)
MCHC RBC AUTO-ENTMCNC: 31.7 G/DL (ref 31.5–36.5)
MCV RBC AUTO: 91 FL (ref 78–100)
PLATELET # BLD AUTO: 180 10E9/L (ref 150–450)
POTASSIUM SERPL-SCNC: 4.7 MMOL/L (ref 3.4–5.3)
RBC # BLD AUTO: 3.71 10E12/L (ref 3.8–5.2)
SODIUM SERPL-SCNC: 139 MMOL/L (ref 133–144)
TACROLIMUS BLD-MCNC: 9.7 UG/L (ref 5–15)
TME LAST DOSE: NORMAL H
WBC # BLD AUTO: 3.3 10E9/L (ref 4–11)

## 2019-12-30 PROCEDURE — 80197 ASSAY OF TACROLIMUS: CPT | Performed by: INTERNAL MEDICINE

## 2019-12-30 PROCEDURE — 85027 COMPLETE CBC AUTOMATED: CPT | Performed by: INTERNAL MEDICINE

## 2019-12-30 PROCEDURE — 80048 BASIC METABOLIC PNL TOTAL CA: CPT | Performed by: INTERNAL MEDICINE

## 2019-12-30 NOTE — TELEPHONE ENCOUNTER
Anemia Management Note  SUBJECTIVE/OBJECTIVE:  Referred by Dr. Nakul Hill on 2019  Primary Diagnosis: Anemia in Chronic Kidney Disease (N18.3, D63.1)     Secondary Diagnosis:  Chronic Kidney Disease, Stage 3 (N18.3)   Kidney Tx: 2019  Hgb goal range:  9-10  Epo/Darbo: Aranesp  40 mcg  every two weeks for Hgb <10.  In clinic.   Iron regimen:  NA  Labs : 2020  Recent CHARLINE use, transfusion, IV iron: PO Iron and Aranesp   RX/TX plans :2020  No history of stroke, MI and blood clots or cancers  Contact:            Ok to leave message  per consent to communicate dated 05/15/2019                              OK to speak with Jt Aleman () per consent to communicate dated 05/15/2013    Anemia Latest Ref Rng & Units 2019 2019 12/3/2019 2019 2019 2019 2019   CHARLINE Dose - - - - - - - -   Hemoglobin 11.7 - 15.7 g/dL 10.4(L) 10.6(L) 9.9(L) 10.5(L) 10.8(L) 10.7(L) 10.7(L)   TSAT 15 - 46 % - - 23 - - - -   Ferritin 12 - 150 ng/mL - - 1,003(H) - - - -     BP Readings from Last 3 Encounters:   19 125/72   19 123/85   19 (!) 136/90     Wt Readings from Last 2 Encounters:   19 62.6 kg (138 lb)   19 62.6 kg (138 lb)           ASSESSMENT:  Hgb:Above goal - recommend hold dose  TSat: not at goal (>30%) but ferritin >1000ng/mL.  PO iron not indicated at this time per anemia protocol.   Ferritin: Elevated (>1000ng/mL)    PLAN:  Hold Aranesp and RTC for hgb then aranesp if needed in 2 week(s)    Orders needed to be renewed (for next follow-up date) in EPIC: None    Iron labs due:  2019    Plan discussed with:  Mona  Plan provided by:  solomon    NEXT FOLLOW-UP DATE:  2020    Manjula Mckinnon RN   Anemia Services  [facility]  [address] [place] [id number]   [email or web address]   Office : [phone]  Fax: [phone]

## 2019-12-31 DIAGNOSIS — Z99.2 ESRD NEEDING DIALYSIS (H): ICD-10-CM

## 2019-12-31 DIAGNOSIS — N18.6 ESRD NEEDING DIALYSIS (H): ICD-10-CM

## 2019-12-31 DIAGNOSIS — Z94.0 KIDNEY TRANSPLANTED: ICD-10-CM

## 2019-12-31 RX ORDER — CINACALCET 30 MG/1
30 TABLET, FILM COATED ORAL DAILY
Qty: 30 TABLET | Refills: 2 | Status: SHIPPED | OUTPATIENT
Start: 2019-12-31 | End: 2020-03-05

## 2019-12-31 RX ORDER — CINACALCET 30 MG/1
TABLET, FILM COATED ORAL
Qty: 90 TABLET | OUTPATIENT
Start: 2019-12-31

## 2020-01-02 NOTE — RESULT ENCOUNTER NOTE
Ms. Kristy,  In addition to showing some improvement in your kidney, the CT also showed a possible septate uterus.  This may cause future issues for you, especially with fertility.  Please plan to discuss this with your primary care physician or OB doctor.  If you have any questions for me, we can definitely discuss this at your next visit.  Thank you, Sky Britt.

## 2020-01-06 DIAGNOSIS — Z94.0 KIDNEY TRANSPLANTED: ICD-10-CM

## 2020-01-06 DIAGNOSIS — Z79.899 LONG TERM USE OF DRUG: ICD-10-CM

## 2020-01-06 DIAGNOSIS — Z94.0 KIDNEY REPLACED BY TRANSPLANT: ICD-10-CM

## 2020-01-06 DIAGNOSIS — Z94.0 KIDNEY REPLACED BY TRANSPLANT: Primary | ICD-10-CM

## 2020-01-06 LAB
ANION GAP SERPL CALCULATED.3IONS-SCNC: 6 MMOL/L (ref 3–14)
BUN SERPL-MCNC: 34 MG/DL (ref 7–30)
CALCIUM SERPL-MCNC: 9.7 MG/DL (ref 8.5–10.1)
CHLORIDE SERPL-SCNC: 110 MMOL/L (ref 94–109)
CO2 SERPL-SCNC: 22 MMOL/L (ref 20–32)
CREAT SERPL-MCNC: 1.16 MG/DL (ref 0.52–1.04)
ERYTHROCYTE [DISTWIDTH] IN BLOOD BY AUTOMATED COUNT: 11.9 % (ref 10–15)
GFR SERPL CREATININE-BSD FRML MDRD: 64 ML/MIN/{1.73_M2}
GLUCOSE SERPL-MCNC: 92 MG/DL (ref 70–99)
HCT VFR BLD AUTO: 34.1 % (ref 35–47)
HGB BLD-MCNC: 10.5 G/DL (ref 11.7–15.7)
MAGNESIUM SERPL-MCNC: 1.5 MG/DL (ref 1.6–2.3)
MCH RBC QN AUTO: 28.8 PG (ref 26.5–33)
MCHC RBC AUTO-ENTMCNC: 30.8 G/DL (ref 31.5–36.5)
MCV RBC AUTO: 93 FL (ref 78–100)
PHOSPHATE SERPL-MCNC: 2.5 MG/DL (ref 2.5–4.5)
PLATELET # BLD AUTO: 191 10E9/L (ref 150–450)
POTASSIUM SERPL-SCNC: 4.8 MMOL/L (ref 3.4–5.3)
RBC # BLD AUTO: 3.65 10E12/L (ref 3.8–5.2)
SODIUM SERPL-SCNC: 138 MMOL/L (ref 133–144)
TACROLIMUS BLD-MCNC: 9.4 UG/L (ref 5–15)
TME LAST DOSE: NORMAL H
WBC # BLD AUTO: 3.6 10E9/L (ref 4–11)

## 2020-01-06 PROCEDURE — 80197 ASSAY OF TACROLIMUS: CPT | Performed by: INTERNAL MEDICINE

## 2020-01-06 PROCEDURE — 80048 BASIC METABOLIC PNL TOTAL CA: CPT | Performed by: INTERNAL MEDICINE

## 2020-01-06 PROCEDURE — 84100 ASSAY OF PHOSPHORUS: CPT | Performed by: INTERNAL MEDICINE

## 2020-01-06 PROCEDURE — 85027 COMPLETE CBC AUTOMATED: CPT | Performed by: INTERNAL MEDICINE

## 2020-01-06 PROCEDURE — 87799 DETECT AGENT NOS DNA QUANT: CPT | Performed by: INTERNAL MEDICINE

## 2020-01-06 PROCEDURE — 83735 ASSAY OF MAGNESIUM: CPT | Performed by: INTERNAL MEDICINE

## 2020-01-08 ENCOUNTER — TELEPHONE (OUTPATIENT)
Dept: TRANSPLANT | Facility: CLINIC | Age: 30
End: 2020-01-08

## 2020-01-08 NOTE — TELEPHONE ENCOUNTER
Post Kidney and Pancreas Transplant Team Conference  Date: 1/8/2020  Transplant Coordinator: Laurence Vazuqez     Attendees:  [x]  Dr. Hill  [x] Leonela Brewster LPN     [x]  Dr. Wilson [x] Cinthia Cox RN [] Kiersten Chang LPN   [x]  Dr. Rios [x] Gely Lynn, ERMELINDA     [x] Keeley Li RN [] Anthony Hendricks, PharmD   [] Dr. Johnson [x] Melani Vazquez RN    [] Dr. Durbin [x] Virgilio Ryan RN    [] Dr. Pinto [x] Ophelia Oneal RN    [] Dr. Delgadillo [x] Yesika Wong RN     [x] Melanie Ayala RN    [] Surgery Fellow [x] Nancy Maldonado RN    [] Johana Linda NP [] Nathalia Moreno RN        Verbal Plan Read Back:   Follow urology-- stent      Routed to RN Coordinator   Cinthia Cox RN    RN[facility] confirmed follow up with urology scheduled for 1/31/2020.

## 2020-01-13 DIAGNOSIS — Z79.899 LONG TERM USE OF DRUG: ICD-10-CM

## 2020-01-13 DIAGNOSIS — Z94.0 KIDNEY REPLACED BY TRANSPLANT: ICD-10-CM

## 2020-01-13 DIAGNOSIS — Z94.0 KIDNEY REPLACED BY TRANSPLANT: Primary | ICD-10-CM

## 2020-01-13 LAB
ANION GAP SERPL CALCULATED.3IONS-SCNC: 7 MMOL/L (ref 3–14)
BUN SERPL-MCNC: 35 MG/DL (ref 7–30)
CALCIUM SERPL-MCNC: 9.7 MG/DL (ref 8.5–10.1)
CHLORIDE SERPL-SCNC: 111 MMOL/L (ref 94–109)
CO2 SERPL-SCNC: 22 MMOL/L (ref 20–32)
CREAT SERPL-MCNC: 1.17 MG/DL (ref 0.52–1.04)
ERYTHROCYTE [DISTWIDTH] IN BLOOD BY AUTOMATED COUNT: 11.9 % (ref 10–15)
GFR SERPL CREATININE-BSD FRML MDRD: 64 ML/MIN/{1.73_M2}
GLUCOSE SERPL-MCNC: 93 MG/DL (ref 70–99)
HCT VFR BLD AUTO: 33.3 % (ref 35–47)
HGB BLD-MCNC: 10.4 G/DL (ref 11.7–15.7)
MCH RBC QN AUTO: 28.7 PG (ref 26.5–33)
MCHC RBC AUTO-ENTMCNC: 31.2 G/DL (ref 31.5–36.5)
MCV RBC AUTO: 92 FL (ref 78–100)
PLATELET # BLD AUTO: 196 10E9/L (ref 150–450)
POTASSIUM SERPL-SCNC: 4.9 MMOL/L (ref 3.4–5.3)
RBC # BLD AUTO: 3.63 10E12/L (ref 3.8–5.2)
SODIUM SERPL-SCNC: 140 MMOL/L (ref 133–144)
TACROLIMUS BLD-MCNC: 9.2 UG/L (ref 5–15)
TME LAST DOSE: NORMAL H
WBC # BLD AUTO: 3.6 10E9/L (ref 4–11)

## 2020-01-13 PROCEDURE — 80197 ASSAY OF TACROLIMUS: CPT | Performed by: INTERNAL MEDICINE

## 2020-01-13 PROCEDURE — 80048 BASIC METABOLIC PNL TOTAL CA: CPT | Performed by: INTERNAL MEDICINE

## 2020-01-13 PROCEDURE — 85027 COMPLETE CBC AUTOMATED: CPT | Performed by: INTERNAL MEDICINE

## 2020-01-14 ENCOUNTER — TELEPHONE (OUTPATIENT)
Dept: PHARMACY | Facility: CLINIC | Age: 30
End: 2020-01-14

## 2020-01-14 DIAGNOSIS — E87.20 METABOLIC ACIDOSIS: ICD-10-CM

## 2020-01-14 DIAGNOSIS — R14.0 BLOATED ABDOMEN: ICD-10-CM

## 2020-01-14 RX ORDER — SIMETHICONE 125 MG
125 TABLET,CHEWABLE ORAL 4 TIMES DAILY PRN
Qty: 120 TABLET | Refills: 0 | Status: SHIPPED | OUTPATIENT
Start: 2020-01-14 | End: 2020-02-06

## 2020-01-14 RX ORDER — SODIUM BICARBONATE 650 MG/1
TABLET ORAL
Qty: 180 TABLET | Refills: 0 | Status: SHIPPED | OUTPATIENT
Start: 2020-01-14 | End: 2020-02-06

## 2020-01-14 NOTE — TELEPHONE ENCOUNTER
Anemia Management Note  SUBJECTIVE/OBJECTIVE:  Referred by Dr. Nakul Hill on 2019  Primary Diagnosis: Anemia in Chronic Kidney Disease (N18.3, D63.1)     Secondary Diagnosis:  Chronic Kidney Disease, Stage 3 (N18.3)   Kidney Tx: 2019  Hgb goal range:  9-10  Epo/Darbo: Aranesp  40 mcg  every two weeks for Hgb <10.  In clinic.   Iron regimen:  NA  Labs : 2020  Recent CHARLINE use, transfusion, IV iron: PO Iron and Aranesp   RX/TX plans :2020  No history of stroke, MI and blood clots or cancers  Contact:            Ok to leave message  per consent to communicate dated 05/15/2019                              OK to speak with Jt Aleman () per consent to communicate dated 05/15/2013    Anemia Latest Ref Rng & Units 12/3/2019 2019 2019 2019 2019 2020 2020   CHARLINE Dose - - - - - - - -   Hemoglobin 11.7 - 15.7 g/dL 9.9(L) 10.5(L) 10.8(L) 10.7(L) 10.7(L) 10.5(L) 10.4(L)   TSAT 15 - 46 % 23 - - - - - -   Ferritin 12 - 150 ng/mL 1,003(H) - - - - - -     BP Readings from Last 3 Encounters:   19 125/72   19 123/85   19 (!) 136/90     Wt Readings from Last 2 Encounters:   19 62.6 kg (138 lb)   19 62.6 kg (138 lb)           ASSESSMENT:  Hgb:Above goal - recommend hold dose  TSat: not at goal (>30%) but ferritin >1000ng/mL.  PO iron not indicated at this time per anemia protocol.   Ferritin: Elevated (>1000ng/mL)    PLAN:  Hold Aranesp and RTC for hgb then aranesp if needed in 2 week(s)    Orders needed to be renewed (for next follow-up date) in EPIC: None    Iron labs due:  Due        NEXT FOLLOW-UP DATE:  2020    Manjula Mckinnon RN   Anemia Services  [place] [address]   [email or web address]   Office : [phone]  Fax: [phone]

## 2020-01-17 DIAGNOSIS — Z94.0 KIDNEY REPLACED BY TRANSPLANT: Primary | ICD-10-CM

## 2020-01-17 RX ORDER — PENTAMIDINE ISETHIONATE 300 MG/300MG
300 INHALANT RESPIRATORY (INHALATION) ONCE
Status: CANCELLED | OUTPATIENT
Start: 2020-02-11

## 2020-01-17 RX ORDER — PENTAMIDINE ISETHIONATE 300 MG/300MG
300 INHALANT RESPIRATORY (INHALATION) ONCE
Status: COMPLETED | OUTPATIENT
Start: 2020-01-17 | End: 2020-01-17

## 2020-01-17 RX ORDER — ALBUTEROL SULFATE 0.83 MG/ML
2.5 SOLUTION RESPIRATORY (INHALATION) ONCE
Status: CANCELLED | OUTPATIENT
Start: 2020-02-11

## 2020-01-17 RX ORDER — ALBUTEROL SULFATE 0.83 MG/ML
2.5 SOLUTION RESPIRATORY (INHALATION) ONCE
Status: COMPLETED | OUTPATIENT
Start: 2020-01-17 | End: 2020-01-17

## 2020-01-17 RX ADMIN — PENTAMIDINE ISETHIONATE 300 MG: 300 INHALANT RESPIRATORY (INHALATION) at 12:14

## 2020-01-17 RX ADMIN — ALBUTEROL SULFATE 2.5 MG: 0.83 SOLUTION RESPIRATORY (INHALATION) at 12:12

## 2020-01-17 NOTE — PROGRESS NOTES
Patient was seen today for a Pentamidine nebulizer tx ordered by Ondina Chandler.    Patient was first given 2.5 mg Albuterol nebulizer, after which Pentamidine 300 mg (Lot # [id number]) mixed with 6cc Sterile H2O was administered through a filtered nebulizer.    No adverse side effects noted by the patient.    Pre-treatment: SpO2 = 100%     HR = 85     BBS = Clear  Post-treatment: SpO2 = 100%    HR = 99     BBS = Clear    Procedure was completed today by Mona Canseco RRT

## 2020-01-23 ENCOUNTER — HOSPITAL ENCOUNTER (INPATIENT)
Facility: CLINIC | Age: 30
LOS: 3 days | Discharge: HOME | DRG: 699 | End: 2020-01-26
Attending: EMERGENCY MEDICINE | Admitting: TRANSPLANT SURGERY
Payer: MEDICARE

## 2020-01-23 ENCOUNTER — APPOINTMENT (OUTPATIENT)
Dept: GENERAL RADIOLOGY | Facility: CLINIC | Age: 30
DRG: 699 | End: 2020-01-23
Attending: EMERGENCY MEDICINE
Payer: MEDICARE

## 2020-01-23 ENCOUNTER — APPOINTMENT (OUTPATIENT)
Dept: ULTRASOUND IMAGING | Facility: CLINIC | Age: 30
DRG: 699 | End: 2020-01-23
Attending: EMERGENCY MEDICINE
Payer: MEDICARE

## 2020-01-23 DIAGNOSIS — R07.9 CHEST PAIN, UNSPECIFIED TYPE: ICD-10-CM

## 2020-01-23 DIAGNOSIS — Z94.0 KIDNEY REPLACED BY TRANSPLANT: ICD-10-CM

## 2020-01-23 DIAGNOSIS — N12 PYELONEPHRITIS: Primary | ICD-10-CM

## 2020-01-23 DIAGNOSIS — R19.7 DIARRHEA, UNSPECIFIED TYPE: ICD-10-CM

## 2020-01-23 LAB
ALBUMIN SERPL-MCNC: 4.5 G/DL (ref 3.4–5)
ALBUMIN UR-MCNC: 30 MG/DL
ALP SERPL-CCNC: 210 U/L (ref 40–150)
ALT SERPL W P-5'-P-CCNC: 20 U/L (ref 0–50)
ANION GAP SERPL CALCULATED.3IONS-SCNC: 7 MMOL/L (ref 3–14)
APPEARANCE UR: CLEAR
AST SERPL W P-5'-P-CCNC: 9 U/L (ref 0–45)
BASOPHILS # BLD AUTO: 0 10E9/L (ref 0–0.2)
BASOPHILS NFR BLD AUTO: 0.2 %
BILIRUB SERPL-MCNC: 1.2 MG/DL (ref 0.2–1.3)
BILIRUB UR QL STRIP: NEGATIVE
BUN SERPL-MCNC: 31 MG/DL (ref 7–30)
CALCIUM SERPL-MCNC: 9.9 MG/DL (ref 8.5–10.1)
CHLORIDE SERPL-SCNC: 108 MMOL/L (ref 94–109)
CO2 SERPL-SCNC: 22 MMOL/L (ref 20–32)
COLOR UR AUTO: YELLOW
CREAT SERPL-MCNC: 1.2 MG/DL (ref 0.52–1.04)
CRP SERPL-MCNC: 26 MG/L (ref 0–8)
DIFFERENTIAL METHOD BLD: ABNORMAL
EOSINOPHIL # BLD AUTO: 0 10E9/L (ref 0–0.7)
EOSINOPHIL NFR BLD AUTO: 0.1 %
ERYTHROCYTE [DISTWIDTH] IN BLOOD BY AUTOMATED COUNT: 12.2 % (ref 10–15)
ERYTHROCYTE [SEDIMENTATION RATE] IN BLOOD BY WESTERGREN METHOD: 29 MM/H (ref 0–20)
FLUAV+FLUBV AG SPEC QL: NEGATIVE
FLUAV+FLUBV AG SPEC QL: NEGATIVE
GFR SERPL CREATININE-BSD FRML MDRD: 62 ML/MIN/{1.73_M2}
GLUCOSE SERPL-MCNC: 109 MG/DL (ref 70–99)
GLUCOSE UR STRIP-MCNC: NEGATIVE MG/DL
HCG UR QL: NEGATIVE
HCT VFR BLD AUTO: 33.7 % (ref 35–47)
HGB BLD-MCNC: 10.4 G/DL (ref 11.7–15.7)
HGB UR QL STRIP: ABNORMAL
IMM GRANULOCYTES # BLD: 0.2 10E9/L (ref 0–0.4)
IMM GRANULOCYTES NFR BLD: 1.8 %
INTERPRETATION ECG - MUSE: NORMAL
KETONES UR STRIP-MCNC: NEGATIVE MG/DL
LACTATE BLD-SCNC: 1.6 MMOL/L (ref 0.7–2)
LACTATE BLD-SCNC: 1.7 MMOL/L (ref 0.7–2)
LEUKOCYTE ESTERASE UR QL STRIP: ABNORMAL
LYMPHOCYTES # BLD AUTO: 0.3 10E9/L (ref 0.8–5.3)
LYMPHOCYTES NFR BLD AUTO: 2.5 %
MCH RBC QN AUTO: 28.9 PG (ref 26.5–33)
MCHC RBC AUTO-ENTMCNC: 30.9 G/DL (ref 31.5–36.5)
MCV RBC AUTO: 94 FL (ref 78–100)
MONOCYTES # BLD AUTO: 0.7 10E9/L (ref 0–1.3)
MONOCYTES NFR BLD AUTO: 6.4 %
NEUTROPHILS # BLD AUTO: 9.9 10E9/L (ref 1.6–8.3)
NEUTROPHILS NFR BLD AUTO: 89 %
NITRATE UR QL: NEGATIVE
NRBC # BLD AUTO: 0 10*3/UL
NRBC BLD AUTO-RTO: 0 /100
PH UR STRIP: 6 PH (ref 5–7)
PLATELET # BLD AUTO: 179 10E9/L (ref 150–450)
POTASSIUM SERPL-SCNC: 4.6 MMOL/L (ref 3.4–5.3)
PROT SERPL-MCNC: 8.1 G/DL (ref 6.8–8.8)
RBC # BLD AUTO: 3.6 10E12/L (ref 3.8–5.2)
RBC #/AREA URNS AUTO: 13 /HPF (ref 0–2)
SODIUM SERPL-SCNC: 137 MMOL/L (ref 133–144)
SOURCE: ABNORMAL
SP GR UR STRIP: 1.01 (ref 1–1.03)
SPECIMEN SOURCE: NORMAL
SQUAMOUS #/AREA URNS AUTO: 1 /HPF (ref 0–1)
TRANS CELLS #/AREA URNS HPF: <1 /HPF (ref 0–1)
TROPONIN I SERPL-MCNC: <0.015 UG/L (ref 0–0.04)
UROBILINOGEN UR STRIP-MCNC: NORMAL MG/DL (ref 0–2)
WBC # BLD AUTO: 11.2 10E9/L (ref 4–11)
WBC #/AREA URNS AUTO: 12 /HPF (ref 0–5)

## 2020-01-23 PROCEDURE — 12000001 ZZH R&B MED SURG/OB UMMC

## 2020-01-23 PROCEDURE — 96365 THER/PROPH/DIAG IV INF INIT: CPT | Performed by: EMERGENCY MEDICINE

## 2020-01-23 PROCEDURE — 25000131 ZZH RX MED GY IP 250 OP 636 PS 637: Mod: GY | Performed by: PHYSICIAN ASSISTANT

## 2020-01-23 PROCEDURE — 84484 ASSAY OF TROPONIN QUANT: CPT | Performed by: EMERGENCY MEDICINE

## 2020-01-23 PROCEDURE — 96361 HYDRATE IV INFUSION ADD-ON: CPT | Performed by: EMERGENCY MEDICINE

## 2020-01-23 PROCEDURE — 25800030 ZZH RX IP 258 OP 636: Performed by: EMERGENCY MEDICINE

## 2020-01-23 PROCEDURE — 93005 ELECTROCARDIOGRAM TRACING: CPT | Performed by: EMERGENCY MEDICINE

## 2020-01-23 PROCEDURE — 87103 BLOOD FUNGUS CULTURE: CPT | Performed by: EMERGENCY MEDICINE

## 2020-01-23 PROCEDURE — 86140 C-REACTIVE PROTEIN: CPT | Performed by: EMERGENCY MEDICINE

## 2020-01-23 PROCEDURE — 83605 ASSAY OF LACTIC ACID: CPT | Performed by: TRANSPLANT SURGERY

## 2020-01-23 PROCEDURE — 99285 EMERGENCY DEPT VISIT HI MDM: CPT | Mod: Z6 | Performed by: EMERGENCY MEDICINE

## 2020-01-23 PROCEDURE — 96366 THER/PROPH/DIAG IV INF ADDON: CPT | Performed by: EMERGENCY MEDICINE

## 2020-01-23 PROCEDURE — 81001 URINALYSIS AUTO W/SCOPE: CPT | Performed by: EMERGENCY MEDICINE

## 2020-01-23 PROCEDURE — 87040 BLOOD CULTURE FOR BACTERIA: CPT | Performed by: EMERGENCY MEDICINE

## 2020-01-23 PROCEDURE — 36415 COLL VENOUS BLD VENIPUNCTURE: CPT | Performed by: TRANSPLANT SURGERY

## 2020-01-23 PROCEDURE — 25000128 H RX IP 250 OP 636: Performed by: EMERGENCY MEDICINE

## 2020-01-23 PROCEDURE — 76776 US EXAM K TRANSPL W/DOPPLER: CPT

## 2020-01-23 PROCEDURE — 25000132 ZZH RX MED GY IP 250 OP 250 PS 637: Mod: GY | Performed by: EMERGENCY MEDICINE

## 2020-01-23 PROCEDURE — 81025 URINE PREGNANCY TEST: CPT | Performed by: EMERGENCY MEDICINE

## 2020-01-23 PROCEDURE — 87186 SC STD MICRODIL/AGAR DIL: CPT | Performed by: PHYSICIAN ASSISTANT

## 2020-01-23 PROCEDURE — 71046 X-RAY EXAM CHEST 2 VIEWS: CPT

## 2020-01-23 PROCEDURE — 87088 URINE BACTERIA CULTURE: CPT | Performed by: PHYSICIAN ASSISTANT

## 2020-01-23 PROCEDURE — 87804 INFLUENZA ASSAY W/OPTIC: CPT | Performed by: EMERGENCY MEDICINE

## 2020-01-23 PROCEDURE — 85025 COMPLETE CBC W/AUTO DIFF WBC: CPT | Performed by: EMERGENCY MEDICINE

## 2020-01-23 PROCEDURE — 83605 ASSAY OF LACTIC ACID: CPT | Performed by: EMERGENCY MEDICINE

## 2020-01-23 PROCEDURE — 99285 EMERGENCY DEPT VISIT HI MDM: CPT | Mod: 25 | Performed by: EMERGENCY MEDICINE

## 2020-01-23 PROCEDURE — 25000132 ZZH RX MED GY IP 250 OP 250 PS 637: Mod: GY | Performed by: PHYSICIAN ASSISTANT

## 2020-01-23 PROCEDURE — 85652 RBC SED RATE AUTOMATED: CPT | Performed by: EMERGENCY MEDICINE

## 2020-01-23 PROCEDURE — 87103 BLOOD FUNGUS CULTURE: CPT | Performed by: PHYSICIAN ASSISTANT

## 2020-01-23 PROCEDURE — 25000131 ZZH RX MED GY IP 250 OP 636 PS 637: Mod: GY | Performed by: TRANSPLANT SURGERY

## 2020-01-23 PROCEDURE — 80053 COMPREHEN METABOLIC PANEL: CPT | Performed by: EMERGENCY MEDICINE

## 2020-01-23 PROCEDURE — 87086 URINE CULTURE/COLONY COUNT: CPT | Performed by: PHYSICIAN ASSISTANT

## 2020-01-23 PROCEDURE — 36415 COLL VENOUS BLD VENIPUNCTURE: CPT | Performed by: PHYSICIAN ASSISTANT

## 2020-01-23 RX ORDER — ACETAMINOPHEN 500 MG
1000 TABLET ORAL ONCE
Status: COMPLETED | OUTPATIENT
Start: 2020-01-23 | End: 2020-01-23

## 2020-01-23 RX ORDER — ACETAMINOPHEN 325 MG/1
650 TABLET ORAL EVERY 4 HOURS PRN
Status: DISCONTINUED | OUTPATIENT
Start: 2020-01-23 | End: 2020-01-24

## 2020-01-23 RX ORDER — CINACALCET 30 MG/1
30 TABLET, FILM COATED ORAL DAILY
Status: DISCONTINUED | OUTPATIENT
Start: 2020-01-24 | End: 2020-01-26 | Stop reason: HOSPADM

## 2020-01-23 RX ORDER — ACETAMINOPHEN AND CODEINE PHOSPHATE 120; 12 MG/5ML; MG/5ML
0.35 SOLUTION ORAL DAILY
Status: DISCONTINUED | OUTPATIENT
Start: 2020-01-24 | End: 2020-01-26 | Stop reason: HOSPADM

## 2020-01-23 RX ORDER — LIDOCAINE 40 MG/G
CREAM TOPICAL
Status: DISCONTINUED | OUTPATIENT
Start: 2020-01-23 | End: 2020-01-26 | Stop reason: HOSPADM

## 2020-01-23 RX ORDER — SIMETHICONE 80 MG
160 TABLET,CHEWABLE ORAL 4 TIMES DAILY PRN
Status: DISCONTINUED | OUTPATIENT
Start: 2020-01-23 | End: 2020-01-26 | Stop reason: HOSPADM

## 2020-01-23 RX ORDER — HYDROXYZINE HYDROCHLORIDE 10 MG/1
10 TABLET, FILM COATED ORAL 3 TIMES DAILY PRN
Status: DISCONTINUED | OUTPATIENT
Start: 2020-01-23 | End: 2020-01-26 | Stop reason: HOSPADM

## 2020-01-23 RX ORDER — ASPIRIN 81 MG/1
81 TABLET, CHEWABLE ORAL DAILY
Status: DISCONTINUED | OUTPATIENT
Start: 2020-01-24 | End: 2020-01-26 | Stop reason: HOSPADM

## 2020-01-23 RX ORDER — LEVOTHYROXINE SODIUM 25 UG/1
25 TABLET ORAL
Status: DISCONTINUED | OUTPATIENT
Start: 2020-01-25 | End: 2020-01-26 | Stop reason: HOSPADM

## 2020-01-23 RX ORDER — ATORVASTATIN CALCIUM 10 MG/1
10 TABLET, FILM COATED ORAL DAILY
Status: DISCONTINUED | OUTPATIENT
Start: 2020-01-24 | End: 2020-01-26 | Stop reason: HOSPADM

## 2020-01-23 RX ORDER — SODIUM CHLORIDE 9 MG/ML
INJECTION, SOLUTION INTRAVENOUS ONCE
Status: COMPLETED | OUTPATIENT
Start: 2020-01-23 | End: 2020-01-23

## 2020-01-23 RX ORDER — CEFTRIAXONE 2 G/1
2 INJECTION, POWDER, FOR SOLUTION INTRAMUSCULAR; INTRAVENOUS ONCE
Status: COMPLETED | OUTPATIENT
Start: 2020-01-23 | End: 2020-01-23

## 2020-01-23 RX ORDER — CEFTRIAXONE 2 G/1
2 INJECTION, POWDER, FOR SOLUTION INTRAMUSCULAR; INTRAVENOUS EVERY 24 HOURS
Status: DISCONTINUED | OUTPATIENT
Start: 2020-01-24 | End: 2020-01-24

## 2020-01-23 RX ORDER — FLUDROCORTISONE ACETATE 0.1 MG/1
0.1 TABLET ORAL
Status: DISCONTINUED | OUTPATIENT
Start: 2020-01-27 | End: 2020-01-26 | Stop reason: HOSPADM

## 2020-01-23 RX ORDER — MAGNESIUM OXIDE 400 MG/1
400 TABLET ORAL 2 TIMES DAILY
Status: DISCONTINUED | OUTPATIENT
Start: 2020-01-23 | End: 2020-01-24

## 2020-01-23 RX ORDER — SODIUM BICARBONATE 650 MG/1
1950 TABLET ORAL 2 TIMES DAILY
Status: DISCONTINUED | OUTPATIENT
Start: 2020-01-23 | End: 2020-01-26 | Stop reason: HOSPADM

## 2020-01-23 RX ORDER — ONDANSETRON 2 MG/ML
4 INJECTION INTRAMUSCULAR; INTRAVENOUS EVERY 6 HOURS PRN
Status: DISCONTINUED | OUTPATIENT
Start: 2020-01-23 | End: 2020-01-26 | Stop reason: HOSPADM

## 2020-01-23 RX ADMIN — CEFTRIAXONE 2 G: 2 INJECTION, POWDER, FOR SOLUTION INTRAMUSCULAR; INTRAVENOUS at 16:05

## 2020-01-23 RX ADMIN — TACROLIMUS 7 MG: 5 CAPSULE ORAL at 22:49

## 2020-01-23 RX ADMIN — SODIUM CHLORIDE 1000 ML: 9 INJECTION, SOLUTION INTRAVENOUS at 14:18

## 2020-01-23 RX ADMIN — MYCOPHENOLIC ACID 540 MG: 360 TABLET, DELAYED RELEASE ORAL at 22:48

## 2020-01-23 RX ADMIN — ACETAMINOPHEN 650 MG: 325 TABLET, FILM COATED ORAL at 18:29

## 2020-01-23 RX ADMIN — ACETAMINOPHEN 650 MG: 325 TABLET, FILM COATED ORAL at 22:40

## 2020-01-23 RX ADMIN — SODIUM CHLORIDE: 9 INJECTION, SOLUTION INTRAVENOUS at 16:05

## 2020-01-23 RX ADMIN — ACETAMINOPHEN 1000 MG: 500 TABLET, FILM COATED ORAL at 14:17

## 2020-01-23 ASSESSMENT — ENCOUNTER SYMPTOMS
FEVER: 0
SHORTNESS OF BREATH: 0
MYALGIAS: 1
COUGH: 0
ABDOMINAL PAIN: 0
CHILLS: 1
HEADACHES: 1

## 2020-01-23 ASSESSMENT — MIFFLIN-ST. JEOR
SCORE: 1281.1
SCORE: 1282.46

## 2020-01-23 NOTE — ED NOTES
[facility], [place]   ED Nurse to Floor Handoff     Mona Wagner is a 29 year old female who speaks English and lives with family members,  in a home  They arrived in the ED by car from home    ED Chief Complaint: Flu Symptoms    ED Dx;   Final diagnoses:   Pyelonephritis   Kidney replaced by transplant         Needed?: No    Allergies:   Allergies   Allergen Reactions     Contrast Dye Rash     CT contrast allergy 12/14/19 rash over eyes. Need to have pre medication before a CT WITH CONTRAST      Chlorhexidine Rash     Rash at site     Sulfa Drugs Rash     Muscle stiffness of neck     Dapsone      Methemoglobinemia     Furosemide Other (See Comments)     Skin flushing     Lisinopril Swelling     angioedema   .  Past Medical Hx:   Past Medical History:   Diagnosis Date     Anemia in chronic kidney disease      Dialysis patient (H)      End stage renal disease on dialysis (H)      History of DVT (deep vein thrombosis) 2014     History of seizure 2014     Hypertension      Hypothyroid      Kidney transplanted 08/03/2019    DCD DDKT. Induction with thymo 6mg/kg.     Pituitary adenoma (H)       Baseline Mental status: WDL  Current Mental Status changes: at basesline    Infection present or suspected this encounter: yes urinary  Sepsis suspected: No  Isolation type: No active isolations     Activity level - Baseline/Home:  Independent  Activity Level - Current:   Independent    Bariatric equipment needed?: No    In the ED these meds were given:   Medications   cefTRIAXone (ROCEPHIN) 2 g vial to attach to  ml bag for ADULTS or NS 50 ml bag for PEDS (2 g Intravenous New Bag 1/23/20 1605)   cefTRIAXone (ROCEPHIN) 2 g vial to attach to  ml bag for ADULTS or NS 50 ml bag for PEDS (has no administration in time range)   ondansetron (ZOFRAN) injection 4 mg (has no administration in time range)   acetaminophen (TYLENOL) tablet 1,000 mg (1,000 mg Oral Given 1/23/20 1417)   0.9%  sodium chloride BOLUS (0 mLs Intravenous Stopped 1/23/20 3924)   sodium chloride 0.9% infusion ( Intravenous New Bag 1/23/20 1605)       Drips running?  Yes    Home pump  No    Current LDAs  Peripheral IV 01/23/20 Right (Active)   Site Assessment WDL 1/23/2020  1:00 PM   Line Status Saline locked;Checked every 1-2 hour 1/23/2020  1:00 PM   Number of days: 0       Hemodialysis Vascular Access Arteriovenous fistula Left Forearm (Active)   Number of days:        Labs results:   Labs Ordered and Resulted from Time of ED Arrival Up to the Time of Departure from the ED   ROUTINE UA WITH MICROSCOPIC - Abnormal; Notable for the following components:       Result Value    Blood Urine Small (*)     Protein Albumin Urine 30 (*)     Leukocyte Esterase Urine Large (*)     WBC Urine 12 (*)     RBC Urine 13 (*)     All other components within normal limits   CBC WITH PLATELETS DIFFERENTIAL - Abnormal; Notable for the following components:    WBC 11.2 (*)     RBC Count 3.60 (*)     Hemoglobin 10.4 (*)     Hematocrit 33.7 (*)     MCHC 30.9 (*)     Absolute Neutrophil 9.9 (*)     Absolute Lymphocytes 0.3 (*)     All other components within normal limits   COMPREHENSIVE METABOLIC PANEL - Abnormal; Notable for the following components:    Glucose 109 (*)     Urea Nitrogen 31 (*)     Creatinine 1.20 (*)     Alkaline Phosphatase 210 (*)     All other components within normal limits   CRP INFLAMMATION - Abnormal; Notable for the following components:    CRP Inflammation 26.0 (*)     All other components within normal limits   ERYTHROCYTE SEDIMENTATION RATE AUTO - Abnormal; Notable for the following components:    Sed Rate 29 (*)     All other components within normal limits   HCG QUALITATIVE URINE   LACTIC ACID WHOLE BLOOD   TROPONIN I   IP ASSIGN PROVIDER TEAM TO TREATMENT TEAM   BLOOD CULTURE   INFLUENZA A/B ANTIGEN   BLOOD CULTURE       Imaging Studies:   Recent Results (from the past 24 hour(s))   XR Chest 2 Views    Narrative     EXAM: XR CHEST 2 VW  1/23/2020 1:29 PM     HISTORY:  cough      COMPARISON:  8/7/2019    FINDINGS: PA and lateral upright views of the chest. Right internal  jugular central venous catheter has been removed. Mildly increased  perihilar peribronchial markings. No focal pulmonary consolidation.  Pulmonary vasculature is distinct with mild vascular redistribution.  Visualized upper abdomen and thoracic bones and soft tissues are  unremarkable.      Impression    IMPRESSION:   Mild bronchial thickening which can be seen in the viral illness or  reactive airways disease. No focal pneumonia.    I have personally reviewed the examination and initial interpretation  and I agree with the findings.    FALGUNI ADAMS MD   US Renal Transplant    Narrative    EXAMINATION: US RENAL TRANSPLANT,  1/23/2020 3:23 PM     COMPARISON: Renal ultrasound 12/30/2019.    HISTORY: 29-year-old female. Tenderness over the right renal  transplant, fever, hx of stent, evaluate for hydronephrosis.    TECHNIQUE:  Grey-scale, color Doppler and spectral flow analysis.    FINDINGS:  The transplant kidney is located right lower quadrant, and measures  14.3 cm. Parenchyma is of normal thickness and echogenicity. No focal  lesions. Resolution of previously demonstrated hydronephrosis. No  perinephric fluid collection. Stent visualized within the ureter.    Renal artery flow:   173 cm/s cm/sec peak systolic at hilum.  198 cm/s peak systolic at anastomosis.  Arcuate artery resistive indices (upper to lower): 0.7, 0.7, 0.65    Renal Vein Flow:  39.7 cm/sat hilum.   95.3 cm/s at anastomosis.    Iliac artery flow:  137.8 cm/s peak systolic above anastomosis.  139.3 cm/s peak systolic below anastomosis.    Iliac vein flow:  Patent above and below the anastomosis.    Bladder: Partially distended.      Impression    IMPRESSION: Patent Doppler evaluation of the right lower quadrant  transplant kidney with resolution of prior hydronephrosis. Stent  visualized  within the ureter.    I have personally reviewed the examination and initial interpretation  and I agree with the findings.    KASIE MORIN MD       Recent vital signs:   /68   Pulse 100   Temp 100.1  F (37.8  C) (Oral)   Resp 18   Ht 1.524 m (5')   Wt 62.6 kg (138 lb)   LMP 11/11/2019   SpO2 100%   BMI 26.95 kg/m      [place] Coma Scale Score: 15 (01/23/20 1621)       Cardiac Rhythm: Normal Sinus  Pt needs tele? No  Skin/wound Issues: None    Code Status: Full Code    Pain control: fair    Nausea control: fair    Abnormal labs/tests/findings requiring intervention: see labs and US    Family present during ED course? Yes   Family Comments/Social Situation comments: Supportive  @ bedside    Tasks needing completion: None    Abimbola Sams    [phone] [facility]

## 2020-01-23 NOTE — ED NOTES
ED Triage Provider Note  [facility]  Encounter Date: Jan 23, 2020  12:55 PM     History:  Chief Complaint   Patient presents with     Flu Symptoms     Mona Wagner is a 29 year old female who presents with one day of fevers, chills, chest pain and cough.  Patient had renal transplants back in August.  Patient states she took some Tylenol prior to arrival and is feeling somewhat improved, but still having diffuse myalgias along with her fevers and chills.  Patient also mentioned that this feels similar to her endocarditis that she had last year.  The chest pain she notes is constant.  Patient makes urine and is no longer dependent on dialysis.      Review of Systems:  Review of Systems   ROS: 14 point ROS neg other than the symptoms noted above in the HPI.      Exam:  /66   Pulse 105   Temp 99.6  F (37.6  C) (Oral)   Resp 18   Ht 1.524 m (5')   Wt 62.6 kg (138 lb)   LMP 11/11/2019   SpO2 100%   BMI 26.95 kg/m    General: No acute distress. Appears stated age.   Cardio: Tachycardic rate, extremities well perfused  Resp: Normal work of breathing, grossly normal respiratory rate  Neuro: Alert. CN II-XII grossly intact. Grossly intact strength.       Medical Decision Making:  Patient arriving to the ED with problem as above. A medical screening exam was performed.  Blood work, urine and chest imaging orders initiated from Triage. The patient is appropriate to to be roomed in next available bed.         Note created by Cl Theodore MD on 1/23/2020 at 12:55 PM       Cl Theodore MD  01/23/20 [id number]

## 2020-01-23 NOTE — ED TRIAGE NOTES
Triage Assessment & Note:    /66   Pulse 105   Temp 99.6  F (37.6  C) (Oral)   Resp 18   Ht 1.524 m (5')   Wt 62.6 kg (138 lb)   LMP 11/11/2019   SpO2 100%   BMI 26.95 kg/m      Patient presents with: C/O flu like symptoms, fever, cough, body aches, cough and nausea. PT is a recent kidney Txp. PT reports symptoms started this AM around 0300     Home Treatments/Remedies: Tylenol at 0700    Febrile / Afebrile?    Duration of C/o:  Since 0300 this AM    Gelacio Crain, ERMELINDA  January 23, 2020

## 2020-01-23 NOTE — H&P
[facility], [place]    History and Physical  Transplant Surgery     Date of Admission:  1/23/2020    Assessment & Plan   Mona Wagner is a 29 year old female who presents with a history of ESRD s/p kidney transplant (08/03/19) complicated by hydronephrosis s/p transplant cystourethroscopy with retrograde pyelography, ureteroscopy, and placement of ureteral stent (12/6/20), DVT, HTN who presents to the Emergency Department today for evaluation of chills, body aches, and headache. Concern for pyelonephritis given pain over graft and UA with WBC 12 and leukocyte esterase.    Admit to transplant surgery  Follow up blood cultures.and urine culture  Ceftriaxone 2g IV every 24 hrs   ml/hr  Regular diet  Zofran PRN  Tylenol PRN   Repeat labs in AM: CBC, BMP, tacrolimus level    Code Status   Full Code  Disposition Plan   Expected discharge: 2 - 3 days, recommended to prior living arrangement once stable.     Entered: Ml Fregoso PA-C 01/23/2020, 4:26 PM   Information in the above section will display in the discharge planner report.    Ml Fregoso PA-C    Primary Care Physician   Macarena Bowen    Chief Complaint   fever    History is obtained from the patient    History of Present Illness   Mona Wagner is a 29 year old female who presents with a history of ESRD s/p kidney transplant (08/03/19) complicated by hydronephrosis s/p transplant cystourethroscopy with retrograde pyelography, ureteroscopy, and placement of ureteral stent (12/6/20), DVT, HTN,  who presents to the Emergency Department today for evaluation of chills, body aches, and headache. Concern for pyelonephritis given pain over graft and UA with WBC 12 and leukocyte esterase. Transplant kidney showed resolution of hydronephrosis and ureteral stent within the ureter.     Patient had moderate hydronephrosis on US with concern for ureteral stricture. Her Cr b/l was 1.1-1.3 prior to procedure. She underwent  transplant cystourethroscopy with retrograde pyelography, ureteroscopy, and placement of ureteral stent on 12/6/20. Per op note no filling defects or strictures of transplanted ureter were found. On repeat imaging today hydronephrosis has resolved. Creatinine post procedure has been 1.1-1.2.     History of  pulmonary valve infective endocarditis (Strep viridans) treated with a several week course of IV vancomycin in 1/2019.    Patient denies SOB, productive cough, nausea, emesis, diarrhea. She reports dysuria, no urgency or frequency. Urine output slightly red. Chest tightness on presentation to ED, resolved.      CXR showed mild bronchial thickening, no focal pneumonia. Negative for influenza A/B agn. Troponin neg. EKG sinus tachycardia. Lactic acid 1.7      Past Medical History    I have reviewed this patient's medical history and updated it with pertinent information if needed.   Past Medical History:   Diagnosis Date     Anemia in chronic kidney disease      Dialysis patient (H)      End stage renal disease on dialysis (H)      History of DVT (deep vein thrombosis) 2014     History of seizure 2014     Hypertension      Hypothyroid      Kidney transplanted 08/03/2019    DCD DDKT. Induction with thymo 6mg/kg.     Pituitary adenoma (H)        Past Surgical History   I have reviewed this patient's surgical history and updated it with pertinent information if needed.  Past Surgical History:   Procedure Laterality Date     BENCH KIDNEY N/A 8/3/2019    Procedure: BACKBENCH PREPARATION, ALLOGRAFT, KIDNEY;  Surgeon: Dorian Johnson MD;  Location: [facility] OR     COMBINED CYSTOSCOPY, RETROGRADES, URETEROSCOPY, LASER HOLMIUM LITHOTRIPSY URETER(S), INSERT STENT N/A 12/6/2019    Procedure: CYSTOURETEROSCOPY, WITH RETROGRADE PYELOGRAM of transplant kidney, STENT INSERTION, Laser on standby;  Surgeon: Wally Britt MD;  Location: [facility] OR     CREATE FISTULA ARTERIOVENOUS UPPER EXTREMITY  1/2/2014    Procedure: CREATE  FISTULA ARTERIOVENOUS UPPER EXTREMITY;  Left Wrist Arteriovenous Fistula Placement;  Surgeon: Shashi Castro MD;  Location: [facility] OR     PERCUTANEOUS BIOPSY KIDNEY Right 2019    Procedure: Right Kidney Biopsy;  Surgeon: Deny Rios MD;  Location: [facility] OR     RASTA/DIALYSIS CATHETER  12/10/2013          TRANSPLANT KIDNEY RECIPIENT  DONOR N/A 8/3/2019    Procedure: TRANSPLANT, KIDNEY, RECIPIENT,  DONOR with Ureteral Stent Placement;  Surgeon: Dorian Johnson MD;  Location: [facility] OR       Prior to Admission Medications   Prior to Admission Medications   Prescriptions Last Dose Informant Patient Reported? Taking?   EPINEPHrine (EPIPEN/ADRENACLICK/OR ANY BX GENERIC EQUIV) 0.3 MG/0.3ML injection 2-pack Past Month at Unknown time  Yes Yes   Si.3 mg   MYFORTIC (BRAND) 180 MG EC tablet 2020 at Unknown time  No Yes   Sig: Take 3 tablets (540 mg) by mouth 2 times daily   acetaminophen (TYLENOL) 325 MG tablet 2020 at Unknown time  No Yes   Sig: Take 2 tablets (650 mg) by mouth every 4 hours as needed for mild pain   aspirin (ASA) 81 MG chewable tablet 2020 at Unknown time  No Yes   Sig: Take 1 tablet (81 mg) by mouth daily   atorvastatin (LIPITOR) 10 MG tablet 2020 at Unknown time  No Yes   Sig: Take 1 tablet (10 mg) by mouth daily   cinacalcet (SENSIPAR) 30 MG tablet 2020 at Unknown time  No Yes   Sig: Take 1 tablet (30 mg) by mouth daily   diphenhydrAMINE (BENADRYL) 25 MG tablet Past Month at Unknown time  No Yes   Sig: Take 1-2 tablets (25-50 mg) by mouth every 6 hours as needed for itching or allergies   fludrocortisone (FLORINEF) 0.1 MG tablet Past Week at Unknown time  Yes Yes   Sig: Take 0.1 mg by mouth twice a week On Tuesday and Thursday   hydrOXYzine (ATARAX) 10 MG tablet Past Month at Unknown time  No Yes   Sig: Take 1 tablet (10 mg) by mouth 3 times daily as needed for anxiety   levothyroxine (SYNTHROID/LEVOTHROID) 25 MCG tablet 2020 at Unknown time  No  Yes   Sig: Take 1 tablet (25 mcg) Tuesday, Thursday, Saturday and Sunday and 1.5 tablets (37.5 mcg) on Monday, Wednesday and Friday every week.   magnesium oxide (MAG-OX) 400 MG tablet 1/22/2020 at Unknown time  No Yes   Sig: Take 1 tablet (400 mg) by mouth 2 times daily   norethindrone (MICRONOR) 0.35 MG tablet 1/22/2020 at Unknown time  No Yes   Sig: Do not restart until your follow up appointment with your surgeon. Discuss restart date at that appointment.   pentamidine (NEBUPENT) 300 MG neb solution Past Week at Unknown time  No Yes   Sig: Inhale 300 mg into the lungs every 28 days   psyllium (METAMUCIL/KONSYL) capsule Past Month at Unknown time  No Yes   Sig: Take 1 capsule by mouth daily   Patient taking differently: Take 1 capsule by mouth daily as needed (loose stools)    simethicone (MYLICON) 125 MG chewable tablet 1/22/2020 at Unknown time  No Yes   Sig: Take 1 tablet (125 mg) by mouth 4 times daily as needed for intestinal gas   sodium bicarbonate 650 MG tablet 1/22/2020 at Unknown time  No Yes   Sig: TAKE 3 TABLETS(1950 MG) BY MOUTH TWICE DAILY   tacrolimus (GENERIC EQUIVALENT) 0.5 MG capsule   No No   Sig: HOLD   tacrolimus (GENERIC EQUIVALENT) 1 MG capsule 1/23/2020 at Unknown time  No Yes   Sig: Total dose = 7 mg BID   tacrolimus (GENERIC EQUIVALENT) 5 MG capsule   No No   Sig: Take 1 capsule (5 mg) by mouth 2 times daily Total dose = 7 mg BID      Facility-Administered Medications: None     Allergies   Allergies   Allergen Reactions     Contrast Dye Rash     CT contrast allergy 12/14/19 rash over eyes. Need to have pre medication before a CT WITH CONTRAST      Chlorhexidine Rash     Rash at site     Sulfa Drugs Rash     Muscle stiffness of neck     Dapsone      Methemoglobinemia     Furosemide Other (See Comments)     Skin flushing     Lisinopril Swelling     angioedema       Social History   I have reviewed this patient's social history and updated it with pertinent information if needed. Mona Wagner   reports that she has never smoked. She has never used smokeless tobacco. She reports that she does not drink alcohol or use drugs.    Family History   I have reviewed this patient's family history and updated it with pertinent information if needed.   Family History   Problem Relation Age of Onset     Hypertension Sister      Diabetes Sister      Cerebrovascular Disease Sister      Kidney Disease Mother      Kidney Disease Sister      Cerebrovascular Disease Father      Coronary Artery Disease No family hx of      Breast Cancer No family hx of      Cancer - colorectal No family hx of      Ovarian Cancer No family hx of      Prostate Cancer No family hx of      Other Cancer No family hx of      Asthma No family hx of      Anesthesia Reaction No family hx of      Deep Vein Thrombosis (DVT) No family hx of        Review of Systems   Review of Systems:  CONSTITUTIONAL: POSITIVE: fever, chills, myalgias.  EYES: negative for icterus or acute vision changes  ENT:  negative for acute hearing loss, tinnitus, sore throat  RESPIRATORY:  negative for cough, sputum or dyspnea. POSITIVE: congestion.   CARDIOVASCULAR:  negative for chest pain, palpitations at time of H&P exam.   GASTROINTESTINAL:  negative for nausea, vomiting, diarrhea or constipation  GENITOURINARY:  negative for frequency or urgency. POSITIVE: dysuria or hematuria  HEME:  No easy bruising or bleeding  INTEGUMENT:  negative for rash or pruritus  NEURO:  Negative for tremor. Positive: HA.       Physical Exam   Temp: 100.1  F (37.8  C) Temp src: Oral BP: 111/68 Pulse: 100 Heart Rate: 101 Resp: 18 SpO2: 100 % O2 Device: None (Room air) Oxygen Delivery: 2 LPM  Vital Signs with Ranges  Temp:  [99.6  F (37.6  C)-100.4  F (38  C)] 100.1  F (37.8  C)  Pulse:  [100-105] 100  Heart Rate:  [101] 101  Resp:  [18-20] 18  BP: (108-121)/(62-68) 111/68  SpO2:  [100 %] 100 %  138 lbs 0 oz    General Appearance: in no apparent distress.   Skin: normal, dry, No rashes,  induration, or jaundice  Heart: sinus tachycardia  Lungs: clear to auscultation. NLB on RA  Abdomen: soft, non distended, and  Focal tenderness RLQ/midline. Tender over kidney graft. The wound is well healed.  : no brooks  Extremities: edema: absent.   Neurologic: awake, alert and oriented. Tremor absent.  AV fistula +thrill.      Data   Results for orders placed or performed during the hospital encounter of 01/23/20 (from the past 24 hour(s))   EKG 12-lead, tracing only   Result Value Ref Range    Interpretation ECG Click View Image link to view waveform and result    CBC with platelets differential   Result Value Ref Range    WBC 11.2 (H) 4.0 - 11.0 10e9/L    RBC Count 3.60 (L) 3.8 - 5.2 10e12/L    Hemoglobin 10.4 (L) 11.7 - 15.7 g/dL    Hematocrit 33.7 (L) 35.0 - 47.0 %    MCV 94 78 - 100 fl    MCH 28.9 26.5 - 33.0 pg    MCHC 30.9 (L) 31.5 - 36.5 g/dL    RDW 12.2 10.0 - 15.0 %    Platelet Count 179 150 - 450 10e9/L    Diff Method Automated Method     % Neutrophils 89.0 %    % Lymphocytes 2.5 %    % Monocytes 6.4 %    % Eosinophils 0.1 %    % Basophils 0.2 %    % Immature Granulocytes 1.8 %    Nucleated RBCs 0 0 /100    Absolute Neutrophil 9.9 (H) 1.6 - 8.3 10e9/L    Absolute Lymphocytes 0.3 (L) 0.8 - 5.3 10e9/L    Absolute Monocytes 0.7 0.0 - 1.3 10e9/L    Absolute Eosinophils 0.0 0.0 - 0.7 10e9/L    Absolute Basophils 0.0 0.0 - 0.2 10e9/L    Abs Immature Granulocytes 0.2 0 - 0.4 10e9/L    Absolute Nucleated RBC 0.0    Lactic acid whole blood   Result Value Ref Range    Lactic Acid 1.7 0.7 - 2.0 mmol/L   Comprehensive metabolic panel   Result Value Ref Range    Sodium 137 133 - 144 mmol/L    Potassium 4.6 3.4 - 5.3 mmol/L    Chloride 108 94 - 109 mmol/L    Carbon Dioxide 22 20 - 32 mmol/L    Anion Gap 7 3 - 14 mmol/L    Glucose 109 (H) 70 - 99 mg/dL    Urea Nitrogen 31 (H) 7 - 30 mg/dL    Creatinine 1.20 (H) 0.52 - 1.04 mg/dL    GFR Estimate 62 >60 mL/min/[1.73_m2]    GFR Estimate If Black 72 >60  mL/min/[1.73_m2]    Calcium 9.9 8.5 - 10.1 mg/dL    Bilirubin Total 1.2 0.2 - 1.3 mg/dL    Albumin 4.5 3.4 - 5.0 g/dL    Protein Total 8.1 6.8 - 8.8 g/dL    Alkaline Phosphatase 210 (H) 40 - 150 U/L    ALT 20 0 - 50 U/L    AST 9 0 - 45 U/L   Troponin I   Result Value Ref Range    Troponin I ES <0.015 0.000 - 0.045 ug/L   CRP inflammation   Result Value Ref Range    CRP Inflammation 26.0 (H) 0.0 - 8.0 mg/L   Erythrocyte sedimentation rate auto   Result Value Ref Range    Sed Rate 29 (H) 0 - 20 mm/h   Blood culture   Result Value Ref Range    Specimen Description Blood Right Arm     Special Requests Aerobic and anaerobic bottles received     Culture Micro PENDING    Influenza A/B antigen   Result Value Ref Range    Influenza A/B Agn Specimen Nasopharyngeal     Influenza A Negative NEG^Negative    Influenza B Negative NEG^Negative   XR Chest 2 Views    Narrative    EXAM: XR CHEST 2 VW  1/23/2020 1:29 PM     HISTORY:  cough      COMPARISON:  8/7/2019    FINDINGS: PA and lateral upright views of the chest. Right internal  jugular central venous catheter has been removed. Mildly increased  perihilar peribronchial markings. No focal pulmonary consolidation.  Pulmonary vasculature is distinct with mild vascular redistribution.  Visualized upper abdomen and thoracic bones and soft tissues are  unremarkable.      Impression    IMPRESSION:   Mild bronchial thickening which can be seen in the viral illness or  reactive airways disease. No focal pneumonia.    I have personally reviewed the examination and initial interpretation  and I agree with the findings.    FALGUNI ADAMS MD   HCG qualitative urine   Result Value Ref Range    HCG Qual Urine Negative NEG^Negative   UA with Microscopic   Result Value Ref Range    Color Urine Yellow     Appearance Urine Clear     Glucose Urine Negative NEG^Negative mg/dL    Bilirubin Urine Negative NEG^Negative    Ketones Urine Negative NEG^Negative mg/dL    Specific Gravity Urine 1.014 1.003  - 1.035    Blood Urine Small (A) NEG^Negative    pH Urine 6.0 5.0 - 7.0 pH    Protein Albumin Urine 30 (A) NEG^Negative mg/dL    Urobilinogen mg/dL Normal 0.0 - 2.0 mg/dL    Nitrite Urine Negative NEG^Negative    Leukocyte Esterase Urine Large (A) NEG^Negative    Source Midstream Urine     WBC Urine 12 (H) 0 - 5 /HPF    RBC Urine 13 (H) 0 - 2 /HPF    Squamous Epithelial /HPF Urine 1 0 - 1 /HPF    Transitional Epi <1 0 - 1 /HPF   US Renal Transplant    Narrative    EXAMINATION: US RENAL TRANSPLANT,  1/23/2020 3:23 PM     COMPARISON: Renal ultrasound 12/30/2019.    HISTORY: 29-year-old female. Tenderness over the right renal  transplant, fever, hx of stent, evaluate for hydronephrosis.    TECHNIQUE:  Grey-scale, color Doppler and spectral flow analysis.    FINDINGS:  The transplant kidney is located right lower quadrant, and measures  14.3 cm. Parenchyma is of normal thickness and echogenicity. No focal  lesions. Resolution of previously demonstrated hydronephrosis. No  perinephric fluid collection. Stent visualized within the ureter.    Renal artery flow:   173 cm/s cm/sec peak systolic at hilum.  198 cm/s peak systolic at anastomosis.  Arcuate artery resistive indices (upper to lower): 0.7, 0.7, 0.65    Renal Vein Flow:  39.7 cm/sat hilum.   95.3 cm/s at anastomosis.    Iliac artery flow:  137.8 cm/s peak systolic above anastomosis.  139.3 cm/s peak systolic below anastomosis.    Iliac vein flow:  Patent above and below the anastomosis.    Bladder: Partially distended.      Impression    IMPRESSION: Patent Doppler evaluation of the right lower quadrant  transplant kidney with resolution of prior hydronephrosis. Stent  visualized within the ureter.    I have personally reviewed the examination and initial interpretation  and I agree with the findings.    KASIE MORIN MD     Attestation:    The patient has been seen and evaluated by me.   Vital signs, labs, medications and orders were reviewed.   When obtained,  diagnostic images were reviewed by me and interpreted as above.    The care plan was discussed with the multidisciplinary team and I agree with the findings and plan in this note, with any differences recorded in blue.    Immunosuppressive medication management was reviewed and adjusted as reflected in the note and orders.     .

## 2020-01-23 NOTE — ED PROVIDER NOTES
History     Chief Complaint   Patient presents with     Flu Symptoms     HPI  Mona Wagner is a 29 year old female with a history of ESRD s/p kidney transplant (08/03/19), DVT, HTN who presents to the Emergency Department today for evaluation of chills, body aches, and headache.  The patient states that this morning at 3 AM she developed chills, body aches, and a headache. She states that when she later got out of bed she had nauseas. She did check her fever this morning did not have a fever. She states that when she got to our hospital she developed chest pain across her upper chest. This has since improved after taking Tylenol in triage. She did receive a flu shot this year. In addition, she has noted a decrease in urine output. The patient denies abdominal pain, or cough.     I have reviewed the Medications, Allergies, Past Medical and Surgical History, and Social History in the [facility] system.    Past Medical History:   Diagnosis Date     Anemia in chronic kidney disease      Dialysis patient (H)      End stage renal disease on dialysis (H)      History of DVT (deep vein thrombosis) 2014     History of seizure 2014     Hypertension      Hypothyroid      Kidney transplanted 08/03/2019    DCD DDKT. Induction with thymo 6mg/kg.     Pituitary adenoma (H)        Past Surgical History:   Procedure Laterality Date     BENCH KIDNEY N/A 8/3/2019    Procedure: BACKBENCH PREPARATION, ALLOGRAFT, KIDNEY;  Surgeon: Dorian Johnson MD;  Location: [facility] OR     COMBINED CYSTOSCOPY, RETROGRADES, URETEROSCOPY, LASER HOLMIUM LITHOTRIPSY URETER(S), INSERT STENT N/A 12/6/2019    Procedure: CYSTOURETEROSCOPY, WITH RETROGRADE PYELOGRAM of transplant kidney, STENT INSERTION, Laser on standby;  Surgeon: Wally Britt MD;  Location: [facility] OR     CREATE FISTULA ARTERIOVENOUS UPPER EXTREMITY  1/2/2014    Procedure: CREATE FISTULA ARTERIOVENOUS UPPER EXTREMITY;  Left Wrist Arteriovenous Fistula Placement;  Surgeon: Shashi Castro MD;   Location: [facility] OR     PERCUTANEOUS BIOPSY KIDNEY Right 2019    Procedure: Right Kidney Biopsy;  Surgeon: Deny Rios MD;  Location: [facility] OR     RASTA/DIALYSIS CATHETER  12/10/2013          TRANSPLANT KIDNEY RECIPIENT  DONOR N/A 8/3/2019    Procedure: TRANSPLANT, KIDNEY, RECIPIENT,  DONOR with Ureteral Stent Placement;  Surgeon: Dorian Johnson MD;  Location: [facility] OR       Family History   Problem Relation Age of Onset     Hypertension Sister      Diabetes Sister      Cerebrovascular Disease Sister      Kidney Disease Mother      Kidney Disease Sister      Cerebrovascular Disease Father      Coronary Artery Disease No family hx of      Breast Cancer No family hx of      Cancer - colorectal No family hx of      Ovarian Cancer No family hx of      Prostate Cancer No family hx of      Other Cancer No family hx of      Asthma No family hx of      Anesthesia Reaction No family hx of      Deep Vein Thrombosis (DVT) No family hx of        Social History     Tobacco Use     Smoking status: Never Smoker     Smokeless tobacco: Never Used   Substance Use Topics     Alcohol use: No     Alcohol/week: 0.0 standard drinks       No current facility-administered medications for this encounter.      Current Outpatient Medications   Medication     acetaminophen (TYLENOL) 325 MG tablet     amoxicillin (AMOXIL) 500 MG capsule     aspirin (ASA) 81 MG chewable tablet     atorvastatin (LIPITOR) 10 MG tablet     cinacalcet (SENSIPAR) 30 MG tablet     diphenhydrAMINE (BENADRYL) 25 MG tablet     EPINEPHrine (EPIPEN/ADRENACLICK/OR ANY BX GENERIC EQUIV) 0.3 MG/0.3ML injection 2-pack     fludrocortisone (FLORINEF) 0.1 MG tablet     hydrOXYzine (ATARAX) 10 MG tablet     levothyroxine (SYNTHROID/LEVOTHROID) 25 MCG tablet     loperamide (IMODIUM) 2 MG capsule     magnesium oxide (MAG-OX) 400 MG tablet     menthol-zinc oxide (CALMOSEPTINE) 0.44-20.6 % OINT ointment     MYFORTIC (BRAND) 180 MG EC tablet     norethindrone  (MICRONOR) 0.35 MG tablet     pentamidine (NEBUPENT) 300 MG neb solution     psyllium (METAMUCIL/KONSYL) capsule     simethicone (MYLICON) 125 MG chewable tablet     sodium bicarbonate 650 MG tablet     tacrolimus (GENERIC EQUIVALENT) 1 MG capsule     tacrolimus (GENERIC EQUIVALENT) 5 MG capsule        Allergies   Allergen Reactions     Contrast Dye Rash     CT contrast allergy 12/14/19 rash over eyes. Need to have pre medication before a CT WITH CONTRAST      Chlorhexidine Rash     Rash at site     Sulfa Drugs Rash     Muscle stiffness of neck     Dapsone      Methemoglobinemia     Furosemide Other (See Comments)     Skin flushing     Lisinopril Swelling     angioedema      Review of Systems   Constitutional: Positive for chills. Negative for fever.   Respiratory: Negative for cough and shortness of breath.    Cardiovascular: Positive for chest pain.   Gastrointestinal: Negative for abdominal pain.   Genitourinary: Positive for decreased urine volume.   Musculoskeletal: Positive for myalgias.   Neurological: Positive for headaches.   All other systems reviewed and are negative.    Physical Exam   BP: 121/66  Pulse: 105  Heart Rate: 101  Temp: 99.6  F (37.6  C)  Resp: 18  Height: 152.4 cm (5')  Weight: 62.6 kg (138 lb)  SpO2: 100 %    Physical Exam  Gen:A&Ox3, no acute distress  HEENT:PERRL, no facial tenderness or wounds, head atraumatic, oropharynx clear, mucous membranes moist, TMs clear bilaterally  Neck:no bony tenderness or step offs, no JVD, trachea midline  Back: no CVA tenderness, no midline bony tenderness  CV: tachycardia, regular rhythm with systolic murmur  PULM:Clear to auscultation bilaterally  Abd:soft, non-distended, tender in RLQ over transplanted kidney  UE:No traumatic injuries, skin normal  LE:no traumatic injuries, skin normal, no LE edema.   Neuro:CN II-XII intact, strength 5/5 throughout  Skin: no rashes or ecchymoses       ED Course   1:43 PM  The patient was seen and examined by Dr. Land  in Room [facility].       Procedures     Critical Care time:  none  Labs Ordered and Resulted from Time of ED Arrival Up to the Time of Departure from the ED   ROUTINE UA WITH MICROSCOPIC - Abnormal; Notable for the following components:       Result Value    Blood Urine Small (*)     Protein Albumin Urine 30 (*)     Leukocyte Esterase Urine Large (*)     WBC Urine 12 (*)     RBC Urine 13 (*)     All other components within normal limits   CBC WITH PLATELETS DIFFERENTIAL - Abnormal; Notable for the following components:    WBC 11.2 (*)     RBC Count 3.60 (*)     Hemoglobin 10.4 (*)     Hematocrit 33.7 (*)     MCHC 30.9 (*)     Absolute Neutrophil 9.9 (*)     Absolute Lymphocytes 0.3 (*)     All other components within normal limits   COMPREHENSIVE METABOLIC PANEL - Abnormal; Notable for the following components:    Glucose 109 (*)     Urea Nitrogen 31 (*)     Creatinine 1.20 (*)     Alkaline Phosphatase 210 (*)     All other components within normal limits   CRP INFLAMMATION - Abnormal; Notable for the following components:    CRP Inflammation 26.0 (*)     All other components within normal limits   ERYTHROCYTE SEDIMENTATION RATE AUTO - Abnormal; Notable for the following components:    Sed Rate 29 (*)     All other components within normal limits   HCG QUALITATIVE URINE   LACTIC ACID WHOLE BLOOD   TROPONIN I   INFLUENZA A/B ANTIGEN       Assessments & Plan (with Medical Decision Making)   29-year-old female with a history of end-stage renal disease status post kidney transplant 8/3/2019 presenting with fevers myalgias chills and headache.  She also has tenderness over her right lower quadrant renal transplant.  Post transplant.  Is been complicated by hydronephrosis of the transplant with a ureteral stent in place.  Arrives afebrile hemodynamic stable than mild tachycardia.  Developed fever while in the emergency department associated with ongoing chills.  IV access obtained and laboratory testing done.  Blood cultures x2  sent.  Functional diagnosis included influenza, endocarditis, pyelonephritis of transplanted kidney, UTI, bacteremia.  Labs notable for a white blood cell count increased at 11.4.  Hemoglobin stable at 8.9.  Platelet 128.  Trending stable at 1.2.  Lactate 1.7.  CRP increased at 26.  bHCG negative.  Influenza testing negative.  UA with small blood, large leukocytes, 12 WBCs, has stent in place but concerning for the possibility of pyelonephritis in the transplanted kidney given localized tenderness.   Transplant ultrasound performed and stable.  Started on IV Rocephin.  Also given 1L IV fluid, followed by continuous infusion.  1000 mg p.o. Tylenol.  Jodi with renal transplant admitted to the transplant service for further management.      I have reviewed the nursing notes.    I have reviewed the findings, diagnosis, plan and need for follow up with the patient.      Final diagnoses:   Pyelonephritis   Kidney replaced by transplant   ITrav, am serving as a trained medical scribe to document services personally performed by Negra Land MD, based on the provider's statements to me.   Negra HAYNES MD, was physically present and have reviewed and verified the accuracy of this note documented by Trav Alves.    1/23/2020   [facility], [place], EMERGENCY DEPARTMENT    MD ZACH Varghese Katrina Anne, MD  01/27/20 5805

## 2020-01-24 LAB
ANION GAP SERPL CALCULATED.3IONS-SCNC: 8 MMOL/L (ref 3–14)
BASOPHILS # BLD AUTO: 0 10E9/L (ref 0–0.2)
BASOPHILS NFR BLD AUTO: 0 %
BUN SERPL-MCNC: 24 MG/DL (ref 7–30)
C DIFF TOX B STL QL: NEGATIVE
C PNEUM DNA SPEC QL NAA+PROBE: NOT DETECTED
CALCIUM SERPL-MCNC: 10 MG/DL (ref 8.5–10.1)
CHLORIDE SERPL-SCNC: 112 MMOL/L (ref 94–109)
CMV DNA SPEC NAA+PROBE-ACNC: NORMAL [IU]/ML
CMV DNA SPEC NAA+PROBE-LOG#: NORMAL {LOG_IU}/ML
CO2 SERPL-SCNC: 17 MMOL/L (ref 20–32)
CREAT SERPL-MCNC: 1.47 MG/DL (ref 0.52–1.04)
DIFFERENTIAL METHOD BLD: ABNORMAL
EOSINOPHIL # BLD AUTO: 0 10E9/L (ref 0–0.7)
EOSINOPHIL NFR BLD AUTO: 0 %
ERYTHROCYTE [DISTWIDTH] IN BLOOD BY AUTOMATED COUNT: 12.4 % (ref 10–15)
FLUAV H1 2009 PAND RNA SPEC QL NAA+PROBE: NOT DETECTED
FLUAV H1 RNA SPEC QL NAA+PROBE: NOT DETECTED
FLUAV H3 RNA SPEC QL NAA+PROBE: NOT DETECTED
FLUAV RNA SPEC QL NAA+PROBE: NOT DETECTED
FLUBV RNA SPEC QL NAA+PROBE: NOT DETECTED
GFR SERPL CREATININE-BSD FRML MDRD: 48 ML/MIN/{1.73_M2}
GLUCOSE SERPL-MCNC: 120 MG/DL (ref 70–99)
HADV DNA SPEC QL NAA+PROBE: NOT DETECTED
HCOV PNL SPEC NAA+PROBE: NOT DETECTED
HCT VFR BLD AUTO: 28.5 % (ref 35–47)
HGB BLD-MCNC: 8.9 G/DL (ref 11.7–15.7)
HMPV RNA SPEC QL NAA+PROBE: NOT DETECTED
HPIV1 RNA SPEC QL NAA+PROBE: NOT DETECTED
HPIV2 RNA SPEC QL NAA+PROBE: NOT DETECTED
HPIV3 RNA SPEC QL NAA+PROBE: NOT DETECTED
HPIV4 RNA SPEC QL NAA+PROBE: NOT DETECTED
LACTATE BLD-SCNC: 2 MMOL/L (ref 0.7–2)
LDH SERPL L TO P-CCNC: 207 U/L (ref 81–234)
LYMPHOCYTES # BLD AUTO: 0.1 10E9/L (ref 0.8–5.3)
LYMPHOCYTES NFR BLD AUTO: 0.9 %
M PNEUMO DNA SPEC QL NAA+PROBE: NOT DETECTED
MAGNESIUM SERPL-MCNC: 1.3 MG/DL (ref 1.6–2.3)
MCH RBC QN AUTO: 28.8 PG (ref 26.5–33)
MCHC RBC AUTO-ENTMCNC: 31.2 G/DL (ref 31.5–36.5)
MCV RBC AUTO: 92 FL (ref 78–100)
METAMYELOCYTES # BLD: 0.1 10E9/L
METAMYELOCYTES NFR BLD MANUAL: 0.9 %
MICROBIOLOGIST REVIEW: NORMAL
MONOCYTES # BLD AUTO: 0 10E9/L (ref 0–1.3)
MONOCYTES NFR BLD AUTO: 0 %
NEUTROPHILS # BLD AUTO: 11.2 10E9/L (ref 1.6–8.3)
NEUTROPHILS NFR BLD AUTO: 98.2 %
PHOSPHATE SERPL-MCNC: 2 MG/DL (ref 2.5–4.5)
PLATELET # BLD AUTO: 128 10E9/L (ref 150–450)
PLATELET # BLD EST: ABNORMAL 10*3/UL
POTASSIUM SERPL-SCNC: 4.1 MMOL/L (ref 3.4–5.3)
RBC # BLD AUTO: 3.09 10E12/L (ref 3.8–5.2)
RBC MORPH BLD: NORMAL
RSV RNA SPEC QL NAA+PROBE: NOT DETECTED
RSV RNA SPEC QL NAA+PROBE: NOT DETECTED
RV+EV RNA SPEC QL NAA+PROBE: NOT DETECTED
SODIUM SERPL-SCNC: 137 MMOL/L (ref 133–144)
SPECIMEN SOURCE: NORMAL
SPECIMEN SOURCE: NORMAL
WBC # BLD AUTO: 11.4 10E9/L (ref 4–11)

## 2020-01-24 PROCEDURE — 25000132 ZZH RX MED GY IP 250 OP 250 PS 637: Mod: GY | Performed by: PHYSICIAN ASSISTANT

## 2020-01-24 PROCEDURE — 87506 IADNA-DNA/RNA PROBE TQ 6-11: CPT | Performed by: PHYSICIAN ASSISTANT

## 2020-01-24 PROCEDURE — 83735 ASSAY OF MAGNESIUM: CPT | Performed by: PHYSICIAN ASSISTANT

## 2020-01-24 PROCEDURE — 85025 COMPLETE CBC W/AUTO DIFF WBC: CPT | Performed by: PHYSICIAN ASSISTANT

## 2020-01-24 PROCEDURE — 25800025 ZZH RX 258: Performed by: PHYSICIAN ASSISTANT

## 2020-01-24 PROCEDURE — 83605 ASSAY OF LACTIC ACID: CPT | Performed by: TRANSPLANT SURGERY

## 2020-01-24 PROCEDURE — 25800030 ZZH RX IP 258 OP 636: Performed by: PHYSICIAN ASSISTANT

## 2020-01-24 PROCEDURE — 87252 VIRUS INOCULATION TISSUE: CPT | Performed by: PHYSICIAN ASSISTANT

## 2020-01-24 PROCEDURE — 36415 COLL VENOUS BLD VENIPUNCTURE: CPT | Performed by: TRANSPLANT SURGERY

## 2020-01-24 PROCEDURE — 87581 M.PNEUMON DNA AMP PROBE: CPT | Performed by: INTERNAL MEDICINE

## 2020-01-24 PROCEDURE — 80048 BASIC METABOLIC PNL TOTAL CA: CPT | Performed by: PHYSICIAN ASSISTANT

## 2020-01-24 PROCEDURE — 87633 RESP VIRUS 12-25 TARGETS: CPT | Performed by: INTERNAL MEDICINE

## 2020-01-24 PROCEDURE — 87493 C DIFF AMPLIFIED PROBE: CPT | Performed by: PHYSICIAN ASSISTANT

## 2020-01-24 PROCEDURE — 25000131 ZZH RX MED GY IP 250 OP 636 PS 637: Mod: GY | Performed by: PHYSICIAN ASSISTANT

## 2020-01-24 PROCEDURE — 87486 CHLMYD PNEUM DNA AMP PROBE: CPT | Performed by: INTERNAL MEDICINE

## 2020-01-24 PROCEDURE — 36415 COLL VENOUS BLD VENIPUNCTURE: CPT | Performed by: PHYSICIAN ASSISTANT

## 2020-01-24 PROCEDURE — 25000131 ZZH RX MED GY IP 250 OP 636 PS 637: Mod: GY | Performed by: TRANSPLANT SURGERY

## 2020-01-24 PROCEDURE — 40000141 ZZH STATISTIC PERIPHERAL IV START W/O US GUIDANCE

## 2020-01-24 PROCEDURE — 25000128 H RX IP 250 OP 636: Performed by: PHYSICIAN ASSISTANT

## 2020-01-24 PROCEDURE — 83615 LACTATE (LD) (LDH) ENZYME: CPT | Performed by: PHYSICIAN ASSISTANT

## 2020-01-24 PROCEDURE — 12000026 ZZH R&B TRANSPLANT

## 2020-01-24 PROCEDURE — 84100 ASSAY OF PHOSPHORUS: CPT | Performed by: PHYSICIAN ASSISTANT

## 2020-01-24 RX ORDER — MAGNESIUM OXIDE 400 MG/1
400 TABLET ORAL
Status: DISCONTINUED | OUTPATIENT
Start: 2020-01-24 | End: 2020-01-26 | Stop reason: HOSPADM

## 2020-01-24 RX ORDER — MAGNESIUM SULFATE HEPTAHYDRATE 40 MG/ML
4 INJECTION, SOLUTION INTRAVENOUS ONCE
Status: COMPLETED | OUTPATIENT
Start: 2020-01-24 | End: 2020-01-24

## 2020-01-24 RX ORDER — ACETAMINOPHEN 325 MG/1
650 TABLET ORAL EVERY 4 HOURS PRN
Status: DISCONTINUED | OUTPATIENT
Start: 2020-01-24 | End: 2020-01-26 | Stop reason: HOSPADM

## 2020-01-24 RX ORDER — PIPERACILLIN SODIUM, TAZOBACTAM SODIUM 3; .375 G/15ML; G/15ML
3.38 INJECTION, POWDER, LYOPHILIZED, FOR SOLUTION INTRAVENOUS EVERY 6 HOURS
Status: DISCONTINUED | OUTPATIENT
Start: 2020-01-24 | End: 2020-01-26

## 2020-01-24 RX ADMIN — Medication 37.5 MCG: at 08:22

## 2020-01-24 RX ADMIN — ATORVASTATIN CALCIUM 10 MG: 10 TABLET, FILM COATED ORAL at 08:21

## 2020-01-24 RX ADMIN — SODIUM BICARBONATE 650 MG TABLET 1950 MG: at 20:23

## 2020-01-24 RX ADMIN — CINACALCET HYDROCHLORIDE 30 MG: 30 TABLET, COATED ORAL at 18:25

## 2020-01-24 RX ADMIN — ACETAMINOPHEN 650 MG: 325 TABLET, FILM COATED ORAL at 02:02

## 2020-01-24 RX ADMIN — TACROLIMUS 7 MG: 5 CAPSULE ORAL at 18:14

## 2020-01-24 RX ADMIN — SODIUM BICARBONATE 650 MG TABLET 1950 MG: at 00:28

## 2020-01-24 RX ADMIN — SIMETHICONE CHEW TAB 80 MG 160 MG: 80 TABLET ORAL at 13:49

## 2020-01-24 RX ADMIN — DEXTROSE AND SODIUM CHLORIDE: 5; 450 INJECTION, SOLUTION INTRAVENOUS at 21:58

## 2020-01-24 RX ADMIN — ACETAMINOPHEN 650 MG: 325 TABLET, FILM COATED ORAL at 22:31

## 2020-01-24 RX ADMIN — HYDROXYZINE HYDROCHLORIDE 10 MG: 10 TABLET ORAL at 15:08

## 2020-01-24 RX ADMIN — ACETAMINOPHEN 650 MG: 325 TABLET, FILM COATED ORAL at 12:55

## 2020-01-24 RX ADMIN — MYCOPHENOLIC ACID 540 MG: 360 TABLET, DELAYED RELEASE ORAL at 18:14

## 2020-01-24 RX ADMIN — MAGNESIUM SULFATE HEPTAHYDRATE 4 G: 40 INJECTION, SOLUTION INTRAVENOUS at 11:07

## 2020-01-24 RX ADMIN — ASPIRIN 81 MG CHEWABLE TABLET 81 MG: 81 TABLET CHEWABLE at 08:21

## 2020-01-24 RX ADMIN — DEXTROSE AND SODIUM CHLORIDE: 5; 450 INJECTION, SOLUTION INTRAVENOUS at 15:04

## 2020-01-24 RX ADMIN — CEFTRIAXONE SODIUM 2 G: 2 INJECTION, POWDER, FOR SOLUTION INTRAMUSCULAR; INTRAVENOUS at 15:22

## 2020-01-24 RX ADMIN — ONDANSETRON 4 MG: 2 INJECTION INTRAMUSCULAR; INTRAVENOUS at 09:32

## 2020-01-24 RX ADMIN — SODIUM BICARBONATE 650 MG TABLET 1950 MG: at 08:20

## 2020-01-24 RX ADMIN — NORETHINDRONE 0.35 MG: 0.35 TABLET ORAL at 20:23

## 2020-01-24 RX ADMIN — SODIUM CHLORIDE 1000 ML: 9 INJECTION, SOLUTION INTRAVENOUS at 11:05

## 2020-01-24 RX ADMIN — ACETAMINOPHEN 650 MG: 325 TABLET, FILM COATED ORAL at 05:35

## 2020-01-24 RX ADMIN — PIPERACILLIN AND TAZOBACTAM 3.38 G: 3; .375 INJECTION, POWDER, FOR SOLUTION INTRAVENOUS at 17:07

## 2020-01-24 RX ADMIN — MYCOPHENOLIC ACID 540 MG: 360 TABLET, DELAYED RELEASE ORAL at 08:20

## 2020-01-24 RX ADMIN — TACROLIMUS 7 MG: 5 CAPSULE ORAL at 08:21

## 2020-01-24 RX ADMIN — PIPERACILLIN AND TAZOBACTAM 3.38 G: 3; .375 INJECTION, POWDER, FOR SOLUTION INTRAVENOUS at 22:32

## 2020-01-24 RX ADMIN — ACETAMINOPHEN 650 MG: 325 TABLET, FILM COATED ORAL at 17:07

## 2020-01-24 RX ADMIN — Medication 400 MG: at 00:27

## 2020-01-24 RX ADMIN — ACETAMINOPHEN 650 MG: 325 TABLET, FILM COATED ORAL at 09:08

## 2020-01-24 ASSESSMENT — ACTIVITIES OF DAILY LIVING (ADL)
TOILETING: 0-->INDEPENDENT
RETIRED_EATING: 0-->INDEPENDENT
COGNITION: 0 - NO COGNITION ISSUES REPORTED
DRESS: 0-->INDEPENDENT
ADLS_ACUITY_SCORE: 13
TRANSFERRING: 0-->INDEPENDENT
BATHING: 0-->INDEPENDENT
ADLS_ACUITY_SCORE: 13
ADLS_ACUITY_SCORE: 13
RETIRED_COMMUNICATION: 0-->UNDERSTANDS/COMMUNICATES WITHOUT DIFFICULTY
ADLS_ACUITY_SCORE: 13
AMBULATION: 0-->INDEPENDENT
SWALLOWING: 0-->SWALLOWS FOODS/LIQUIDS WITHOUT DIFFICULTY
FALL_HISTORY_WITHIN_LAST_SIX_MONTHS: NO
ADLS_ACUITY_SCORE: 13
ADLS_ACUITY_SCORE: 13

## 2020-01-24 ASSESSMENT — MIFFLIN-ST. JEOR: SCORE: 1291.5

## 2020-01-24 NOTE — PROGRESS NOTES
Transplant Surgery  Inpatient Daily Progress Note  01/24/2020    Assessment & Plan: Mona Wagner is a 29 year old female who presents with a history of ESRD s/p kidney transplant (08/03/19) complicated by hydronephrosis s/p transplant cystourethroscopy with retrograde pyelography, ureteroscopy, and placement of ureteral stent, no filling defects or strictures of transplanted ureter were found (12/6/20), DVT, HTN who presents to the Emergency Department today for evaluation of chills, body aches, and headache. Concern for pyelonephritis given pain over graft and UA with WBC 12 and leukocyte esterase.     Graft function: CAMERON secondary to infection, pre renal. Cr 1.47 up from 1.2. US kidney - resolution of prior hydronephrosis. Cr 1.1-1.2 after ureteral stent placed on 12/6. Concern for pyelonephritis given pain over graft.   Immunosuppression management: PTA myfortic 540 mg BID and tacrolimus 7 mg BID. Check tac level tomorrow. tac goal 8-10..  Complexity of management:Medium. Contributing factors: active infection  Hematology: Hgb 8.9. Decreased from ~ 10, likely due to dilution. No signs of bleeding. Check LDH and haptoglobin. Repeat CBC tomorrow.   Cardiorespiratory: Tachycardia. SBP 100s-130s. PTA florinef 2 times a week (Tuesday and Thursday) ordered.   GI/Nutrition: WAYNE.  -Diarrhea, occurring after starting antibiotic. Hold magnesium as this causes loose stools per patient.   Endocrine: No issues  Fluid/Electrolytes: MIVF: MIV stopped o/n. Will give 1L NS now and restart D5 1/2  ml /hr. Replace Mg 4gm IV x1. Hold oral Mg for now.   : No brooks. Ureteral stent in place. Urology aware of patient   Infectious disease: T max 102.9, WBC 11.4. UA 12 WBC, leukocyte esterase, blood and albumin.  UC in process. Bcx in process. Influenza A/B agn negative. Check viral respiratory culture.   Neuro: acetaminophen PRN for myalgias and HA.   Prophylaxis: DVT start chemical ppx if not ambulating, PJP: pentamidine (received  12/20), CMV: IgG R+/D+.   Disposition: Transfer to  when bed available.     Medical Decision Making: Medium  Subsequent visit 61498 (moderate level decision making)    MAR/Fellow/Resident Provider: Ml Fregoso PA-C, BELTRAN    Faculty: Ramsey Herrera M.D.  _________________________________________________________________  Transplant History: Admitted 1/23/2020 for fever.  8/3/2019 (Kidney), Postoperative day: 174     Interval History: History is obtained from the patient  Overnight events: C/o HA, myalgias, nausea. Diarrhea starting after admission. Pain over graft. No general abdominal pain.     ROS:   A 10-point review of systems was negative except as noted above.    Meds:    sodium chloride 0.9%  1,000 mL Intravenous Once     aspirin  81 mg Oral Daily     atorvastatin  10 mg Oral Daily     cefTRIAXone  2 g Intravenous Q24H     cinacalcet  30 mg Oral Daily     [START ON 1/27/2020] fludrocortisone  0.1 mg Oral Once per day on Mon Thu     levothyroxine  37.5 mcg Oral Q Mon Wed Fri AM     [START ON 1/25/2020] levothyroxine  25 mcg Oral Once per day on Sun Tue Thu Sat     mycophenolic acid  540 mg Oral BID IS     norethindrone  0.35 mg Oral Daily     sodium bicarbonate  1,950 mg Oral BID     sodium chloride (PF)  3 mL Intracatheter Q8H     tacrolimus  7 mg Oral BID IS       Physical Exam:     Admit Weight: 62.6 kg (138 lb)    Current vitals:   /53 (BP Location: Right arm)   Pulse 129   Temp 99.8  F (37.7  C) (Oral)   Resp 20   Ht 1.524 m (5')   Wt 62.5 kg (137 lb 11.2 oz)   LMP 11/11/2019   SpO2 100%   BMI 26.89 kg/m           Vital sign ranges:    Temp:  [97.4  F (36.3  C)-102.9  F (39.4  C)] 99.8  F (37.7  C)  Pulse:  [100-129] 129  Heart Rate:  [] 102  Resp:  [18-22] 20  BP: (101-137)/(53-82) 101/53  SpO2:  [97 %-100 %] 100 %  Patient Vitals for the past 24 hrs:   BP Temp Temp src Pulse Heart Rate Resp SpO2 Weight   01/24/20 1257 -- 99.8  F (37.7  C) Oral -- -- -- -- --   01/24/20 1120  101/53 98.3  F (36.8  C) Oral -- 102 -- 100 % --   01/24/20 0936 -- 97.4  F (36.3  C) Oral -- -- -- -- --   01/24/20 0815 120/63 101.4  F (38.6  C) Oral -- 95 20 98 % --   01/24/20 0620 -- 101.2  F (38.4  C) -- -- -- -- -- --   01/24/20 0415 137/82 101.3  F (38.5  C) Oral -- 122 20 97 % --   01/24/20 0029 -- 100.2  F (37.9  C) -- -- -- -- -- --   01/23/20 2241 -- 102.7  F (39.3  C) Oral 129 -- 20 97 % --   01/23/20 2205 127/70 102.9  F (39.4  C) Oral 122 -- 20 97 % --   01/23/20 2145 -- 100.3  F (37.9  C) Oral -- -- 20 -- 62.5 kg (137 lb 11.2 oz)   01/23/20 2040 118/75 -- -- 122 -- 22 100 % --   01/23/20 1828 -- 100.8  F (38.2  C) Oral -- -- -- -- --   01/23/20 1621 111/68 100.1  F (37.8  C) Oral -- 101 18 100 % --   01/23/20 1418 108/62 100.4  F (38  C) Oral 100 -- 20 100 % --     General Appearance: mild distress due to myalgias.   Skin: normal, dry, No rashes, induration, or jaundice  Heart: sinus tachycardia  Lungs: clear to auscultation. NLB on RA  Abdomen: soft, non distended, and  Focal tenderness RLQ/midline. Tender over kidney graft. The wound is well healed.  : no brooks  Extremities: edema: absent.   Neurologic: awake, alert and oriented. Tremor absent.  PIV  AV fistula +thrill.    Data:   CMP  Recent Labs   Lab 01/24/20  0648 01/23/20  1301    137   POTASSIUM 4.1 4.6   CHLORIDE 112* 108   CO2 17* 22   * 109*   BUN 24 31*   CR 1.47* 1.20*   GFRESTIMATED 48* 62   GFRESTBLACK 56* 72   SHAMIR 10.0 9.9   MAG 1.3*  --    PHOS 2.0*  --    ALBUMIN  --  4.5   BILITOTAL  --  1.2   ALKPHOS  --  210*   AST  --  9   ALT  --  20     CBC  Recent Labs   Lab 01/24/20  0648 01/23/20  1301   HGB 8.9* 10.4*   WBC 11.4* 11.2*   * 179     COAGSNo lab results found in last 7 days.    Invalid input(s): XA   Urinalysis  Recent Labs   Lab Test 01/23/20  1421 12/20/19  1141 12/03/19  1215  09/03/19  0940   COLOR Yellow Straw Yellow   < >  --    APPEARANCE Clear Clear Clear   < >  --    URINEGLC Negative Negative  Negative   < >  --    URINEBILI Negative Negative Negative   < >  --    URINEKETONE Negative Negative Negative   < >  --    SG 1.014 1.008 1.010   < >  --    UBLD Small* Large* Small*   < >  --    URINEPH 6.0 7.0 8.0*   < >  --    PROTEIN 30* Negative Negative   < >  --    NITRITE Negative Negative Negative   < >  --    LEUKEST Large* Trace* Negative   < >  --    RBCU 13* 2 1   < >  --    WBCU 12* 3 1   < >  --    UTPG  --   --  0.15  --  0.24*    < > = values in this interval not displayed.     Virology:  CMV IgG Antibody   Date Value Ref Range Status   12/26/2013 >10.00  Positive for anti-CMV IgG U/mL Final     EBV VCA IgG Antibody   Date Value Ref Range Status   12/26/2013 57.80 U/mL Final     Comment:     Positive, suggests immunologic exposure.     Hepatitis C Antibody   Date Value Ref Range Status   08/02/2019 Nonreactive NR^Nonreactive Final     Comment:     Assay performance characteristics have not been established for newborns,   infants, and children       Hep B Surface Noemy   Date Value Ref Range Status   12/12/2013 34.4  Final     Comment:     Reactive, Patient is considered to be immune to infection with hepatitis B   when   the value is greater than or equal to 12.0 mlU/mL.     BK Virus Result   Date Value Ref Range Status   01/06/2020 BK Virus DNA Not Detected BKNEG^BK Virus DNA Not Detected copies/mL Final   12/03/2019 BK Virus DNA Not Detected BKNEG^BK Virus DNA Not Detected copies/mL Final   12/02/2019 BK Virus DNA Not Detected BKNEG^BK Virus DNA Not Detected copies/mL Final   11/04/2019 BK Virus DNA Not Detected BKNEG^BK Virus DNA Not Detected copies/mL Final   10/07/2019 BK Virus DNA Not Detected BKNEG^BK Virus DNA Not Detected copies/mL Final   09/17/2019 BK Virus DNA Not Detected BKNEG^BK Virus DNA Not Detected copies/mL Final   09/03/2019 BK Virus DNA Not Detected BKNEG^BK Virus DNA Not Detected copies/mL Final       Attestation:    The patient has been seen and evaluated by me.   Vital signs,  labs, medications and orders were reviewed.   When obtained, diagnostic images were reviewed by me and interpreted as above.    The care plan was discussed with the multidisciplinary team and I agree with the findings and plan in this note, with any differences recorded in blue.    Immunosuppressive medication management was reviewed and adjusted as reflected in the note and orders.     .

## 2020-01-24 NOTE — ED NOTES
Attempt for 3rd time to call report to nurse on 7B.  Someone answers phone and immediately hangs up w/o speaking.  Have informed ER CN of staff's inability to get report.

## 2020-01-24 NOTE — CONSULTS
[facility], [place]  Transplant Nephrology Consult  Date of Admission:  1/23/2020  Today's Date: 01/24/2020  Requesting physician: Rasmey Herrera MD    Assessment & Plan   # DDKT: Slight increase in Creatinine to 1.4, will follow. U/S negative for hydro. Probable UTI likely contributing, will follow up Cx. If Urine Cx is positive, will need to discuss with Urology potential inpatient stent exchange vs removal. Low suspicion in context of her infectious symptoms for rejection or de kaushik GN.              - Baseline Cr ~ 1.1-1.3              - Proteinuria: Minimal (0.2-0.5 grams)              - Date DSA Last Checked: Dec/2019      Latest DSA: No              - BK Viremia: No              - Kidney Tx Biopsy: Sep 17, 2019; Result: No diagnostic evidence of acute rejection.     # Immunosuppression: Tacrolimus immediate release (goal 8-10) and Mycophenolic acid (goal 1.0-3.5)              - Changes: No, drug levels have been good     # Infection Prophylaxis:   - PJP: Inhaled pentamidine    # Fever, Body Aches: UTI vs Respiratory illness or both. Sick contact noted. Await RVP testing. On Abx for potential UTI, await UCx.      # Blood Pressure: Normotensive;          Goal BP: > 100, but < 130 systolic              - Volume status: Euvolemic              - Changes: No, okay for IVF for today, will follow. Plan to decrease Florinef outpatient if BP's stable.      # Anemia in Chronic Renal Disease: Hgb: Decreased, ~9      CHARLINE: Yes              - Iron studies: Replete     # Mineral Bone Disorder:   - Secondary renal hyperparathyroidism; PTH level: Moderately elevated (301-600 pg/ml) and will continue on cinacalcet  - Vitamin D; level: Low        On Supplement: No due to hypercalcemia  - Calcium; level: High normal      On Supplement: No  - Phosphorus; level: Low        On Supplement: No     # Electrolytes:   - Potassium; level: Normal        On Supplement: No  - Magnesium; level: Normal         On Supplement: Yes  - Bicarbonate; level: Low, will follow            # Transplant Ureteral Stenosis: As above, U/S negative for hydronephrosis. If UTI is present, Urological evaluation for stent management.     # Transplant History:  Etiology of Kidney Failure: IgA nephropathy  Tx: DDKT  Transplant: 8/3/2019 (Kidney)  Donor Type: Donation after Circulatory Death           Donor Class: Standard Criteria Donor  Crossmatch at time of Tx: negative  DSA at time of Tx: No  Significant changes in immunosuppression: None  CMV IgG Ab High Risk Discordance (D+/R-): No  EBV IgG Ab High Risk Discordance (D+/R-): No  Significant transplant-related complications: None    Recommendations were communicated to the primary team verbally.    Seen and discussed with Dr. 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   She notes that a few days ago, she spent time with her sister in law who had a URI. Over the past 2 days, she notes runny nose, body aches, intermittent dry cough. Additionally, she notes some discomfort over her allograft. Since receiving Abx overnight, she notes a loose stool this AM - but no diarrhea prior to admission.   She denies any chest pain, SOB, LE swelling. No changes in UOP, hematuria or dysuria. Appetite has been normal. She states she has been adherent with her rejection medications.     Review of Systems    The 10 point Review of Systems is negative other than noted in the HPI or here.     Past Medical History    I have reviewed this patient's medical history and updated it with pertinent information if needed.   Past Medical History:   Diagnosis Date     Anemia in chronic kidney disease      Dialysis patient (H)      End stage renal disease on  dialysis (H)      History of DVT (deep vein thrombosis)      History of seizure      Hypertension      Hypothyroid      Kidney transplanted 2019    DCD DDKT. Induction with thymo 6mg/kg.     Pituitary adenoma (H)        Past Surgical History   I have reviewed this patient's surgical history and updated it with pertinent information if needed.  Past Surgical History:   Procedure Laterality Date     BENCH KIDNEY N/A 8/3/2019    Procedure: BACKBENCH PREPARATION, ALLOGRAFT, KIDNEY;  Surgeon: Dorian Johnson MD;  Location: [facility] OR     COMBINED CYSTOSCOPY, RETROGRADES, URETEROSCOPY, LASER HOLMIUM LITHOTRIPSY URETER(S), INSERT STENT N/A 2019    Procedure: CYSTOURETEROSCOPY, WITH RETROGRADE PYELOGRAM of transplant kidney, STENT INSERTION, Laser on standby;  Surgeon: Wally Britt MD;  Location: [facility] OR     CREATE FISTULA ARTERIOVENOUS UPPER EXTREMITY  2014    Procedure: CREATE FISTULA ARTERIOVENOUS UPPER EXTREMITY;  Left Wrist Arteriovenous Fistula Placement;  Surgeon: Shashi Castro MD;  Location: [facility] OR     PERCUTANEOUS BIOPSY KIDNEY Right 2019    Procedure: Right Kidney Biopsy;  Surgeon: Deny Rios MD;  Location: [facility] OR     RASTA/DIALYSIS CATHETER  12/10/2013          TRANSPLANT KIDNEY RECIPIENT  DONOR N/A 8/3/2019    Procedure: TRANSPLANT, KIDNEY, RECIPIENT,  DONOR with Ureteral Stent Placement;  Surgeon: Dorian Johnson MD;  Location: [facility] OR       Family History   I have reviewed this patient's family history and updated it with pertinent information if needed.   Family History   Problem Relation Age of Onset     Hypertension Sister      Diabetes Sister      Cerebrovascular Disease Sister      Kidney Disease Mother      Kidney Disease Sister      Cerebrovascular Disease Father      Coronary Artery Disease No family hx of      Breast Cancer No family hx of      Cancer - colorectal No family hx of      Ovarian Cancer No family hx of      Prostate Cancer No  family hx of      Other Cancer No family hx of      Asthma No family hx of      Anesthesia Reaction No family hx of      Deep Vein Thrombosis (DVT) No family hx of        Social History   I have reviewed this patient's social history and updated it with pertinent information if needed. Mona Wagner  reports that she has never smoked. She has never used smokeless tobacco. She reports that she does not drink alcohol or use drugs.    Allergies   Allergies   Allergen Reactions     Contrast Dye Rash     CT contrast allergy 19 rash over eyes. Need to have pre medication before a CT WITH CONTRAST      Chlorhexidine Rash     Rash at site     Sulfa Drugs Rash     Muscle stiffness of neck     Dapsone      Methemoglobinemia     Furosemide Other (See Comments)     Skin flushing     Lisinopril Swelling     angioedema     Prior to Admission Medications     aspirin  81 mg Oral Daily     atorvastatin  10 mg Oral Daily     cefTRIAXone  2 g Intravenous Q24H     cinacalcet  30 mg Oral Daily     [START ON 2020] fludrocortisone  0.1 mg Oral Once per day on      levothyroxine  37.5 mcg Oral Q  AM     [START ON 2020] levothyroxine  25 mcg Oral Once per day on Sun Tue Thu Sat     mycophenolic acid  540 mg Oral BID IS     norethindrone  0.35 mg Oral Daily     sodium bicarbonate  1,950 mg Oral BID     sodium chloride (PF)  3 mL Intracatheter Q8H     tacrolimus  7 mg Oral BID IS       dextrose 5% and 0.45% NaCl         Physical Exam   Temp  Av.5  F (38.1  C)  Min: 97.4  F (36.3  C)  Max: 102.9  F (39.4  C)      Pulse  Av.6  Min: 100  Max: 129 Resp  Av.8  Min: 18  Max: 22  SpO2  Av.8 %  Min: 97 %  Max: 100 %     /53 (BP Location: Right arm)   Pulse 129   Temp 99.8  F (37.7  C) (Oral)   Resp 20   Ht 1.524 m (5')   Wt 62.5 kg (137 lb 11.2 oz)   LMP 2019   SpO2 100%   BMI 26.89 kg/m     Date 20 0700 - 20 0659   Shift 6362-7129 5607-1979 7914-4154 24 Hour Total    INTAKE   P.O. 60   60   Shift Total(mL/kg) 60(0.96)   60(0.96)   OUTPUT   Urine 150   150   Shift Total(mL/kg) 150(2.4)   150(2.4)   Weight (kg) 62.46 62.46 62.46 62.46      Admit Weight: 62.6 kg (138 lb)     GENERAL APPEARANCE: alert and no distress  HENT: mouth without ulcers or lesions  LYMPHATICS: no cervical  nodes  RESP: lungs clear to auscultation - no wheezes  CV: regular rhythm, normal rate  EDEMA: no LE edema bilaterally  ABDOMEN: soft, nondistended, mild TTP over allograft  NEURO: AOX3,  SKIN: no rash    Data   CMP  Recent Labs   Lab 01/24/20  0648 01/23/20  1301    137   POTASSIUM 4.1 4.6   CHLORIDE 112* 108   CO2 17* 22   ANIONGAP 8 7   * 109*   BUN 24 31*   CR 1.47* 1.20*   GFRESTIMATED 48* 62   GFRESTBLACK 56* 72   SHAMIR 10.0 9.9   MAG 1.3*  --    PHOS 2.0*  --    PROTTOTAL  --  8.1   ALBUMIN  --  4.5   BILITOTAL  --  1.2   ALKPHOS  --  210*   AST  --  9   ALT  --  20     CBC  Recent Labs   Lab 01/24/20  0648 01/23/20  1301   HGB 8.9* 10.4*   WBC 11.4* 11.2*   RBC 3.09* 3.60*   HCT 28.5* 33.7*   MCV 92 94   MCH 28.8 28.9   MCHC 31.2* 30.9*   RDW 12.4 12.2   * 179     INRNo lab results found in last 7 days.  ABGNo lab results found in last 7 days.   Urine Studies  Recent Labs   Lab Test 01/23/20  1421 12/20/19  1141 12/03/19  1215 11/22/19  1055   COLOR Yellow Straw Yellow Yellow   APPEARANCE Clear Clear Clear Clear   URINEGLC Negative Negative Negative Negative   URINEBILI Negative Negative Negative Negative   URINEKETONE Negative Negative Negative Negative   SG 1.014 1.008 1.010 1.012   UBLD Small* Large* Small* Small*   URINEPH 6.0 7.0 8.0* 7.0   PROTEIN 30* Negative Negative Negative   NITRITE Negative Negative Negative Negative   LEUKEST Large* Trace* Negative Negative   RBCU 13* 2 1 <1   WBCU 12* 3 1 1     Recent Labs   Lab Test 12/03/19  1215 09/03/19  0940 12/11/13  1915   UTPG 0.15 0.24* 7.42*     PTH  Recent Labs   Lab Test 12/03/19  1112 08/09/19  0559 12/11/13  0601    PTHI 210* 358* 1,131*     Iron Studies  Recent Labs   Lab Test 12/03/19  1112 09/03/19  0944 08/30/19  1700 08/12/19  0619 12/29/16  1338 12/11/13  0600   IRON 47 87 138 96 137 65   * 254 270 162* 228* 245   IRONSAT 23 34 51* 59* 60* 26   MONET 1,003* 1,535* 1,746*  --  1,039*  --        IMAGING:  All imaging studies reviewed by me.     Attestation:  This patient has been seen and evaluated by me, Deny Rios MD.  I have reviewed the note and agree with plan of care as documented by the fellow.

## 2020-01-24 NOTE — PLAN OF CARE
Pt arrived to  from from the ED around 2130 via cart and ambulated to the bed. Admitted for pyelonephritis and flu-like symptoms (negative for influenza), hx of R kidney transplant in 2019. Pt has AV fistula on L lower forearm, bruit present. Pt A&Ox4, neuros intact. Tmax 102.9, provider notified. LR @125 mL/hr. Pt voiding and having bowel movents, pt reports having diaherra since rocephin was administered earlier in the day in the ED. Tylenol PRN q 4hrs for pain and fever. Intermittent nausea, denies chest pain or SOB.

## 2020-01-24 NOTE — PLAN OF CARE
Patient denies OSB, clear LS, on RA. Febrile, tmax- 101.3, +chills, Tylenol given x2. +BS, soft non distended, loose stools x6 since last night from previous shift. Jordon nausea. Increase oral fluid intake. Paged x3 ([phone]) on-call, re: temp, chills, and episodes of loose stools, awaiting response. Voiding spont. Left AV fistula intact; +thrill, +bruit. Ambulates SBA. Continue poc.   Vitals:    01/23/20 2205 01/23/20 2241 01/24/20 0029 01/24/20 0415   BP: 127/70   137/82   BP Location: Right arm   Right arm   Pulse: 122 129     Resp: 20 20 20   Temp: 102.9  F (39.4  C) 102.7  F (39.3  C) 100.2  F (37.9  C) 101.3  F (38.5  C)   TempSrc: Oral Oral  Oral   SpO2: 97% 97%  97%   Weight:       Height:

## 2020-01-24 NOTE — PROVIDER NOTIFICATION
Paged [phone] at around 5370, re: still febrile, +chills, and episodes of loose stools. Awaiting response.

## 2020-01-25 LAB
ANION GAP SERPL CALCULATED.3IONS-SCNC: 5 MMOL/L (ref 3–14)
BACTERIA SPEC CULT: ABNORMAL
BASOPHILS # BLD AUTO: 0 10E9/L (ref 0–0.2)
BASOPHILS NFR BLD AUTO: 0.1 %
BUN SERPL-MCNC: 16 MG/DL (ref 7–30)
C COLI+JEJUNI+LARI FUSA STL QL NAA+PROBE: NOT DETECTED
CALCIUM SERPL-MCNC: 9.4 MG/DL (ref 8.5–10.1)
CHLORIDE SERPL-SCNC: 112 MMOL/L (ref 94–109)
CO2 SERPL-SCNC: 20 MMOL/L (ref 20–32)
CREAT SERPL-MCNC: 1.57 MG/DL (ref 0.52–1.04)
DIFFERENTIAL METHOD BLD: ABNORMAL
EC STX1 GENE STL QL NAA+PROBE: NOT DETECTED
EC STX2 GENE STL QL NAA+PROBE: NOT DETECTED
ENTERIC PATHOGEN COMMENT: NORMAL
EOSINOPHIL # BLD AUTO: 0 10E9/L (ref 0–0.7)
EOSINOPHIL NFR BLD AUTO: 0.1 %
ERYTHROCYTE [DISTWIDTH] IN BLOOD BY AUTOMATED COUNT: 12.5 % (ref 10–15)
GFR SERPL CREATININE-BSD FRML MDRD: 45 ML/MIN/{1.73_M2}
GLUCOSE SERPL-MCNC: 157 MG/DL (ref 70–99)
HCT VFR BLD AUTO: 26.2 % (ref 35–47)
HGB BLD-MCNC: 8.1 G/DL (ref 11.7–15.7)
IMM GRANULOCYTES # BLD: 0.1 10E9/L (ref 0–0.4)
IMM GRANULOCYTES NFR BLD: 1.7 %
LYMPHOCYTES # BLD AUTO: 0.5 10E9/L (ref 0.8–5.3)
LYMPHOCYTES NFR BLD AUTO: 6.3 %
Lab: ABNORMAL
MAGNESIUM SERPL-MCNC: 1.9 MG/DL (ref 1.6–2.3)
MCH RBC QN AUTO: 28.3 PG (ref 26.5–33)
MCHC RBC AUTO-ENTMCNC: 30.9 G/DL (ref 31.5–36.5)
MCV RBC AUTO: 92 FL (ref 78–100)
MONOCYTES # BLD AUTO: 0.7 10E9/L (ref 0–1.3)
MONOCYTES NFR BLD AUTO: 9.6 %
NEUTROPHILS # BLD AUTO: 6.2 10E9/L (ref 1.6–8.3)
NEUTROPHILS NFR BLD AUTO: 82.2 %
NOROV GI+II ORF1-ORF2 JNC STL QL NAA+PR: NOT DETECTED
NRBC # BLD AUTO: 0 10*3/UL
NRBC BLD AUTO-RTO: 0 /100
PHOSPHATE SERPL-MCNC: 1.7 MG/DL (ref 2.5–4.5)
PLATELET # BLD AUTO: 107 10E9/L (ref 150–450)
POTASSIUM SERPL-SCNC: 4.2 MMOL/L (ref 3.4–5.3)
RBC # BLD AUTO: 2.86 10E12/L (ref 3.8–5.2)
RVA NSP5 STL QL NAA+PROBE: NOT DETECTED
SALMONELLA SP RPOD STL QL NAA+PROBE: NOT DETECTED
SHIGELLA SP+EIEC IPAH STL QL NAA+PROBE: NOT DETECTED
SODIUM SERPL-SCNC: 138 MMOL/L (ref 133–144)
SPECIMEN SOURCE: ABNORMAL
TACROLIMUS BLD-MCNC: 7.3 UG/L (ref 5–15)
TME LAST DOSE: NORMAL H
V CHOL+PARA RFBL+TRKH+TNAA STL QL NAA+PR: NOT DETECTED
WBC # BLD AUTO: 7.5 10E9/L (ref 4–11)
Y ENTERO RECN STL QL NAA+PROBE: NOT DETECTED

## 2020-01-25 PROCEDURE — 80048 BASIC METABOLIC PNL TOTAL CA: CPT | Performed by: PHYSICIAN ASSISTANT

## 2020-01-25 PROCEDURE — 87799 DETECT AGENT NOS DNA QUANT: CPT | Performed by: SURGERY

## 2020-01-25 PROCEDURE — 83010 ASSAY OF HAPTOGLOBIN QUANT: CPT | Performed by: SURGERY

## 2020-01-25 PROCEDURE — 25000125 ZZHC RX 250: Performed by: STUDENT IN AN ORGANIZED HEALTH CARE EDUCATION/TRAINING PROGRAM

## 2020-01-25 PROCEDURE — 25000132 ZZH RX MED GY IP 250 OP 250 PS 637: Mod: GY | Performed by: PHYSICIAN ASSISTANT

## 2020-01-25 PROCEDURE — 85025 COMPLETE CBC W/AUTO DIFF WBC: CPT | Performed by: PHYSICIAN ASSISTANT

## 2020-01-25 PROCEDURE — 25000131 ZZH RX MED GY IP 250 OP 636 PS 637: Mod: GY | Performed by: TRANSPLANT SURGERY

## 2020-01-25 PROCEDURE — 25000131 ZZH RX MED GY IP 250 OP 636 PS 637: Mod: GY | Performed by: PHYSICIAN ASSISTANT

## 2020-01-25 PROCEDURE — 25800030 ZZH RX IP 258 OP 636: Performed by: STUDENT IN AN ORGANIZED HEALTH CARE EDUCATION/TRAINING PROGRAM

## 2020-01-25 PROCEDURE — 12000026 ZZH R&B TRANSPLANT

## 2020-01-25 PROCEDURE — 36415 COLL VENOUS BLD VENIPUNCTURE: CPT | Performed by: SURGERY

## 2020-01-25 PROCEDURE — 83735 ASSAY OF MAGNESIUM: CPT | Performed by: PHYSICIAN ASSISTANT

## 2020-01-25 PROCEDURE — 25000128 H RX IP 250 OP 636: Performed by: PHYSICIAN ASSISTANT

## 2020-01-25 PROCEDURE — 84100 ASSAY OF PHOSPHORUS: CPT | Performed by: PHYSICIAN ASSISTANT

## 2020-01-25 PROCEDURE — 80197 ASSAY OF TACROLIMUS: CPT | Performed by: PHYSICIAN ASSISTANT

## 2020-01-25 PROCEDURE — 36415 COLL VENOUS BLD VENIPUNCTURE: CPT | Performed by: PHYSICIAN ASSISTANT

## 2020-01-25 PROCEDURE — 25800025 ZZH RX 258: Performed by: PHYSICIAN ASSISTANT

## 2020-01-25 RX ORDER — LOPERAMIDE HCL 2 MG
2 CAPSULE ORAL 4 TIMES DAILY PRN
Status: DISCONTINUED | OUTPATIENT
Start: 2020-01-25 | End: 2020-01-26 | Stop reason: HOSPADM

## 2020-01-25 RX ADMIN — TACROLIMUS 7 MG: 5 CAPSULE ORAL at 08:51

## 2020-01-25 RX ADMIN — PIPERACILLIN AND TAZOBACTAM 3.38 G: 3; .375 INJECTION, POWDER, FOR SOLUTION INTRAVENOUS at 22:26

## 2020-01-25 RX ADMIN — HYDROXYZINE HYDROCHLORIDE 10 MG: 10 TABLET ORAL at 23:58

## 2020-01-25 RX ADMIN — ASPIRIN 81 MG CHEWABLE TABLET 81 MG: 81 TABLET CHEWABLE at 08:50

## 2020-01-25 RX ADMIN — CINACALCET HYDROCHLORIDE 30 MG: 30 TABLET, COATED ORAL at 18:22

## 2020-01-25 RX ADMIN — DEXTROSE AND SODIUM CHLORIDE: 5; 450 INJECTION, SOLUTION INTRAVENOUS at 14:27

## 2020-01-25 RX ADMIN — TACROLIMUS 7 MG: 5 CAPSULE ORAL at 18:16

## 2020-01-25 RX ADMIN — PIPERACILLIN AND TAZOBACTAM 3.38 G: 3; .375 INJECTION, POWDER, FOR SOLUTION INTRAVENOUS at 16:56

## 2020-01-25 RX ADMIN — LEVOTHYROXINE SODIUM 25 MCG: 25 TABLET ORAL at 08:51

## 2020-01-25 RX ADMIN — SODIUM BICARBONATE 650 MG TABLET 1950 MG: at 08:51

## 2020-01-25 RX ADMIN — PIPERACILLIN AND TAZOBACTAM 3.38 G: 3; .375 INJECTION, POWDER, FOR SOLUTION INTRAVENOUS at 04:21

## 2020-01-25 RX ADMIN — PIPERACILLIN AND TAZOBACTAM 3.38 G: 3; .375 INJECTION, POWDER, FOR SOLUTION INTRAVENOUS at 10:24

## 2020-01-25 RX ADMIN — ACETAMINOPHEN 650 MG: 325 TABLET, FILM COATED ORAL at 06:26

## 2020-01-25 RX ADMIN — MYCOPHENOLIC ACID 540 MG: 360 TABLET, DELAYED RELEASE ORAL at 08:51

## 2020-01-25 RX ADMIN — ATORVASTATIN CALCIUM 10 MG: 10 TABLET, FILM COATED ORAL at 08:51

## 2020-01-25 RX ADMIN — NORETHINDRONE 0.35 MG: 0.35 TABLET ORAL at 19:57

## 2020-01-25 RX ADMIN — MYCOPHENOLIC ACID 540 MG: 360 TABLET, DELAYED RELEASE ORAL at 18:16

## 2020-01-25 RX ADMIN — SODIUM BICARBONATE 650 MG TABLET 1950 MG: at 19:56

## 2020-01-25 RX ADMIN — POTASSIUM PHOSPHATE, MONOBASIC AND POTASSIUM PHOSPHATE, DIBASIC 20 MMOL: 224; 236 INJECTION, SOLUTION INTRAVENOUS at 23:51

## 2020-01-25 ASSESSMENT — ACTIVITIES OF DAILY LIVING (ADL)
ADLS_ACUITY_SCORE: 13
ADLS_ACUITY_SCORE: 11

## 2020-01-25 ASSESSMENT — MIFFLIN-ST. JEOR: SCORE: 1289.72

## 2020-01-25 NOTE — PLAN OF CARE
BP 97/57   Pulse 92   Temp 98.5  F (36.9  C) (Oral)   Resp 16   Ht 1.524 m (5')   Wt 63.3 kg (139 lb 9.6 oz)   LMP 11/11/2019   SpO2 95%   BMI 27.26 kg/m       070-1530:  A/Ox4. VS stable on room air. Patient C/o headache but declines pain meds. Patient denies nausea. Urine Output - voiding spontaneously. Bowel Function - loose BM x3 this shift, negative for C diff. D51/2NS @125mL/hr in PIV.  Nutrition - regular diet. Activity - SBA. Education - given ACD paperwork, consult with SW cathy. Plan of Care - continue to monitor.     Delicia Parks RN on 1/25/2020 at 12:56 PM

## 2020-01-25 NOTE — PROGRESS NOTES
[facility] [place]   Transplant Nephrology Progress Note  Date of Admission:  1/23/2020  Today's Date: 01/25/2020    Assessment & Plan   # DDKT: Trend up   - Baseline Cr ~ 1.1-1.3   - Proteinuria: Minimal (0.2-0.5 grams)   - Date DSA Last Checked: Dec/2019      Latest DSA: No   - BK Viremia: No   - Kidney Tx Biopsy: Sep 17, 2019; Result: No evidence of rejection    Cr trending up, but she continues to have diarrhea and an active infection (febrile to 101F overnight). Appreciate urology input on stent removal vs replacement given her worsening renal injury along with a likely stent-related urinary tract infection. Plan for stent removal 1/31/20     # Immunosuppression: Tacrolimus immediate release (goal 8-10) and Mycophenolic acid (goal 1.0-3.5)   - Changes: No    # Infection Prophylaxis:   - PJP: Inhaled pentamidine    # Blood Pressure: Controlled;  Goal BP: < 130/80   - Volume status: Euvolemic    - Changes: No    # Elevated Blood Glucose: Glucose generally running ~ 120-150   - Management as per primary team.    # Anemia in Chronic Renal Disease: Hgb: Stable      CHARLINE: Yes   - Iron studies: Replete    # Mineral Bone Disorder:   - Secondary renal hyperparathyroidism; PTH level: Moderately elevated (301-600 pg/ml)        On treatment: Cinacalcet  - Vitamin D; level: Low        On Supplement: No  - Calcium; level: Normal        On Supplement: No  - Phosphorus; level: Low        On Supplement: No    # Electrolytes:   - Potassium; level: Normal        On Supplement: No  - Magnesium; level: Normal        On Supplement: No  - Bicarbonate; level: Normal        On Supplement: Yes  - Sodium; level: Normal        On Supplement: No    # Transplant History:  Etiology of Kidney Failure: IgA nephropathy  Tx: DDKT  Transplant: 8/3/2019 (Kidney)  Donor Type: Donation after Circulatory Death  Donor Class: Standard Criteria Donor  Crossmatch at time of Tx: negative  DSA at time of Tx: No  Significant  changes in immunosuppression: None  Significant transplant-related complications: None    Recommendations were communicated to the primary team verbally.    Seen and discussed with Dr. Sergio Linda MD   Pager: [phone]    Attestation:  This patient has been seen and evaluated by me, Nakul Hill MD.  I have reviewed the note and agree with plan of care as documented by the fellow.       Interval History   Fever 101.4F overnight, she denies rigors or diaphoresis. Antibiotics are continuing, sensitivities of her enterococcus pending. She denies chest pain and she has no shortness of breath. She reports no dysuria, and has regular watery diarrhea throughout the night and morning. She has no new rashes or lesions. She denies LE swelling. Her appetite has been normal.     Review of Systems   4 point ROS was obtained and negative except as noted in the Interval History.    MEDICATIONS:    aspirin  81 mg Oral Daily     atorvastatin  10 mg Oral Daily     cinacalcet  30 mg Oral Daily     [START ON 2020] fludrocortisone  0.1 mg Oral Once per day on      levothyroxine  37.5 mcg Oral Q  AM     levothyroxine  25 mcg Oral Once per day on Sun Tue Thu Sat     mycophenolic acid  540 mg Oral BID IS     norethindrone  0.35 mg Oral Daily     piperacillin-tazobactam  3.375 g Intravenous Q6H     sodium bicarbonate  1,950 mg Oral BID     sodium chloride (PF)  3 mL Intracatheter Q8H     tacrolimus  7 mg Oral BID IS       dextrose 5% and 0.45% NaCl 125 mL/hr at 20 1427       Physical Exam   Temp  Av  F (37.8  C)  Min: 97.4  F (36.3  C)  Max: 102.9  F (39.4  C)      Pulse  Av.6  Min: 83  Max: 129 Resp  Av.1  Min: 16  Max: 22  SpO2  Av.5 %  Min: 95 %  Max: 100 %     /76 (BP Location: Right arm)   Pulse 92   Temp 98  F (36.7  C) (Oral)   Resp 16   Ht 1.524 m (5')   Wt 63.3 kg (139 lb 9.6 oz)   LMP 2019   SpO2 100%   BMI 27.26 kg/m     Date 20 0700 -  01/26/20 0659   Shift 6796-1505 2859-6680 8893-6305 24 Hour Total   INTAKE   P.O. 120   120   Shift Total(mL/kg) 120(1.9)   120(1.9)   OUTPUT   Shift Total(mL/kg)       Weight (kg) 63.32 63.32 63.32 63.32      Admit Weight: 62.6 kg (138 lb)     GENERAL APPEARANCE: alert and no distress  HENT: mouth without ulcers or lesions  LYMPHATICS: no cervical or supraclavicular nodes  RESP: lungs clear to auscultation - no rales, rhonchi or wheezes  CV: regular rhythm, normal rate, no rub, no murmur  EDEMA: no LE edema bilaterally  ABDOMEN: soft, nondistended, nontender, bowel sounds normal  MS: extremities normal - no gross deformities noted, no evidence of inflammation in joints, no muscle tenderness  SKIN: no rash  ACCESS: LUE AVF with good thrill  Allograft: Non -tender    Data   All labs reviewed by me.  CMP  Recent Labs   Lab 01/25/20  0656 01/24/20  0648 01/23/20  1301    137 137   POTASSIUM 4.2 4.1 4.6   CHLORIDE 112* 112* 108   CO2 20 17* 22   ANIONGAP 5 8 7   * 120* 109*   BUN 16 24 31*   CR 1.57* 1.47* 1.20*   GFRESTIMATED 45* 48* 62   GFRESTBLACK 52* 56* 72   SHAMIR 9.4 10.0 9.9   MAG 1.9 1.3*  --    PHOS 1.7* 2.0*  --    PROTTOTAL  --   --  8.1   ALBUMIN  --   --  4.5   BILITOTAL  --   --  1.2   ALKPHOS  --   --  210*   AST  --   --  9   ALT  --   --  20     CBC  Recent Labs   Lab 01/25/20  0656 01/24/20  0648 01/23/20  1301   HGB 8.1* 8.9* 10.4*   WBC 7.5 11.4* 11.2*   RBC 2.86* 3.09* 3.60*   HCT 26.2* 28.5* 33.7*   MCV 92 92 94   MCH 28.3 28.8 28.9   MCHC 30.9* 31.2* 30.9*   RDW 12.5 12.4 12.2   * 128* 179     INRNo lab results found in last 7 days.  ABGNo lab results found in last 7 days.   Urine Studies  Recent Labs   Lab Test 01/23/20  1421 12/20/19  1141 12/03/19  1215 11/22/19  1055   COLOR Yellow Straw Yellow Yellow   APPEARANCE Clear Clear Clear Clear   URINEGLC Negative Negative Negative Negative   URINEBILI Negative Negative Negative Negative   URINEKETONE Negative Negative Negative  Negative   SG 1.014 1.008 1.010 1.012   UBLD Small* Large* Small* Small*   URINEPH 6.0 7.0 8.0* 7.0   PROTEIN 30* Negative Negative Negative   NITRITE Negative Negative Negative Negative   LEUKEST Large* Trace* Negative Negative   RBCU 13* 2 1 <1   WBCU 12* 3 1 1     Recent Labs   Lab Test 12/03/19  1215 09/03/19  0940 12/11/13  1915   UTPG 0.15 0.24* 7.42*     PTH  Recent Labs   Lab Test 12/03/19  1112 08/09/19  0559 12/11/13  0601   PTHI 210* 358* 1,131*     Iron Studies  Recent Labs   Lab Test 12/03/19  1112 09/03/19  0944 08/30/19  1700 08/12/19  0619 12/29/16  1338 12/11/13  0600   IRON 47 87 138 96 137 65   * 254 270 162* 228* 245   IRONSAT 23 34 51* 59* 60* 26   MONET 1,003* 1,535* 1,746*  --  1,039*  --      IMAGING:  All imaging studies reviewed by me.

## 2020-01-25 NOTE — PROGRESS NOTES
Focus:  Status  D:   Monitoring status  I:    Vitals taken        amb to BR with assist of 1        Tolerated diet         Temp 101.4+ got Tylenol q4 hr, see emar          Nasal Swab sent and was on droplet   stool for C-DIFF,Sent. Now has Enteric precautions           Got Liter Bolus time one over 2 hrs           Started MIVFs           Lots and lots of repeated request. Pt also ask for what she wanted time at a  Time.          Got a new PIV and at 1914 ask [facility] to check the new IV due to possibly positional  A: denied resp distress/cp nor any signs observed  P:   Reported to 7A nurse

## 2020-01-25 NOTE — CONSULTS
Brief Urology Note    Urology team made aware of patient upon admission by primary service, Transplant Surgery. 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 She developed hydronephrosis in txp kidney and is s/p cysto, ureteroscopy, and stent placement on 12/6/19 with Dr. Britt  (no filling defects or strictures of the transplanted ureter noted at that time). Saw Dr. Britt in follow up on 12/20/19 at which time hydro was noted to be improved on CT. Cr worsened despite this intervention. She is now admitted with sx of infection and pain over graft concerning for pyelonephritis. Renal US showed stent and resolution of hydro. Febrile up to 101 overnight. Urine culture positive for  K enterococcus with susceptibilities pending. WBC 7.5. Cr 1.57 today (from 1.47 yesterday). Currently on zosyn. Discussed with primary team today that plan will be for stent removal per Dr. Britt at clinic visit scheduled for 1/31/20. Active UTI is not indication for stent removal and may put patient at risk for worsening infection due to  tract manipulation in setting of infection.      -Follow urine culture and narrow based on susceptibilities.   -Please continue antibiotic regimen through her planned stent removal with Dr. Britt on 1/31/20.  -Please call or page with any additional questions or concerns.    Discussed with chief resident, Dr. Sawyer, who discussed with staff, Dr. Britt.    Sandeep Noe MD  Urology, PGY-2

## 2020-01-25 NOTE — PLAN OF CARE
BP 98/61 (BP Location: Right arm)   Pulse 129   Temp 98.4  F (36.9  C) (Oral)   Resp 18   Ht 1.524 m (5')   Wt 63.5 kg (139 lb 15.9 oz)   LMP 11/11/2019   SpO2 99%   BMI 27.34 kg/m      5543-1897. TMAX 101.0. Tachycardic (100-120's), soft BPs. Denies pain and nausea. Pt reporting chills and sweats in relation to fevers, PRN tylenol available. Tolerating a regular diet. RPIV w/ D5 1/2 LR @ 125.Voiding, not saving. No BM reported overnight. Up with SBA. Will continue to monitor and update with any changes,

## 2020-01-25 NOTE — PROGRESS NOTES
"Transplant Surgery  Inpatient Daily Progress Note  01/25/2020    Assessment & Plan: Mona Wagner is a 29 year old female who presents with a history of ESRD s/p kidney transplant (08/03/19) complicated by hydronephrosis s/p transplant cystourethroscopy with retrograde pyelography, ureteroscopy, and placement of ureteral stent, no filling defects or strictures of transplanted ureter were found (12/6/20), DVT, HTN who presents to the Emergency Department today for evaluation of chills, body aches, and headache. Concern for pyelonephritis given pain over graft and UA with WBC 12 and leukocyte esterase.     Graft function: CAMERON secondary to infection, pre renal. Cr 1.47 up from 1.2. US kidney - resolution of prior hydronephrosis. Cr 1.1-1.2 after ureteral stent placed on 12/6. Concern for pyelonephritis given pain over graft and Cx growing enterococcus, spoke with urology regarding stent, recommendation \"Active UTI is not indication for stent removal and may put patient at risk for worsening infection due to  tract manipulation in setting of infection.  \"  Immunosuppression management: PTA myfortic 540 mg BID and tacrolimus 7 mg BID. Check tac level today. tac goal 8-10..  Complexity of management:Medium. Contributing factors: active infection  Hematology: Hgb 8.1. Decreased from ~ 10, likely due to dilution. No signs of bleeding. Check LDH and haptoglobin. Repeat CBC tomorrow.   Cardiorespiratory: Tachycardia. SBP 100s-130s. PTA florinef 2 times a week (Tuesday and Thursday) ordered.   GI/Nutrition: WAYNE.  -Diarrhea, occurring after starting antibiotic. Hold magnesium as this causes loose stools per patient.   Endocrine: No issues  Fluid/Electrolytes: MIVF: MIV stopped o/n. Will give 1L NS now and restart D5 1/2  ml /hr. Replace Mg 4gm IV x1. Hold oral Mg for now.   : No brooks. Ureteral stent in place. Urology aware of patient   Infectious disease: T max 102.9, WBC 11.4. UA 12 WBC, leukocyte esterase, blood and " albumin.  UC in process. Bcx in process. Influenza A/B agn negative. Check viral respiratory culture.   Neuro: acetaminophen PRN for myalgias and HA.   Prophylaxis: DVT start chemical ppx if not ambulating, PJP: pentamidine (received 12/20), CMV: IgG R+/D+.   Disposition: 7A, awaiting sensitivities and resolution of fever     Medical Decision Making: Medium  Subsequent visit 08458 (moderate level decision making)    MAR/Fellow/Resident Provider: Ayo Becerril MD Fellow [id number]    Faculty: Ramsey Herrera M.D.  _________________________________________________________________  Transplant History: Admitted 1/23/2020 for fever.  8/3/2019 (Kidney), Postoperative day: 175     Interval History: History is obtained from the patient  Overnight events:   Pain improved  Tenderness improved.  Feeling better, no nausea, vomiting  Some dysuria    ROS:   A 10-point review of systems was negative except as noted above.    Meds:    aspirin  81 mg Oral Daily     atorvastatin  10 mg Oral Daily     cinacalcet  30 mg Oral Daily     [START ON 1/27/2020] fludrocortisone  0.1 mg Oral Once per day on Mon Thu     levothyroxine  37.5 mcg Oral Q Mon Wed Fri AM     levothyroxine  25 mcg Oral Once per day on Sun Tue Thu Sat     mycophenolic acid  540 mg Oral BID IS     norethindrone  0.35 mg Oral Daily     piperacillin-tazobactam  3.375 g Intravenous Q6H     sodium bicarbonate  1,950 mg Oral BID     sodium chloride (PF)  3 mL Intracatheter Q8H     tacrolimus  7 mg Oral BID IS       Physical Exam:     Admit Weight: 62.6 kg (138 lb)    Current vitals:   BP 97/57   Pulse 92   Temp 98.5  F (36.9  C) (Oral)   Resp 16   Ht 1.524 m (5')   Wt 63.3 kg (139 lb 9.6 oz)   LMP 11/11/2019   SpO2 95%   BMI 27.26 kg/m           Vital sign ranges:    Temp:  [98.2  F (36.8  C)-101  F (38.3  C)] 98.5  F (36.9  C)  Pulse:  [83-92] 92  Heart Rate:  [] 92  Resp:  [16-21] 16  BP: ()/(56-78) 97/57  SpO2:  [95 %-100 %] 95 %  Patient Vitals for the  past 24 hrs:   BP Temp Temp src Pulse Heart Rate Resp SpO2 Weight   01/25/20 1122 97/57 98.5  F (36.9  C) Oral 92 92 16 95 % --   01/25/20 0838 100/56 98.2  F (36.8  C) Oral 83 83 18 98 % --   01/25/20 0500 -- -- -- -- -- -- -- 63.3 kg (139 lb 9.6 oz)   01/25/20 0312 98/61 98.4  F (36.9  C) Oral -- 88 18 99 % --   01/24/20 2308 110/69 101  F (38.3  C) Oral -- 102 20 97 % --   01/24/20 2140 (!) 146/70 98.7  F (37.1  C) Oral -- 104 -- 100 % --   01/24/20 1529 -- -- -- -- -- -- -- 63.5 kg (139 lb 15.9 oz)   01/24/20 1528 120/78 100.7  F (38.2  C) Axillary -- 115 21 98 % --     General Appearance: mild distress due to myalgias.   Skin: normal, dry, No rashes, induration, or jaundice  Heart: sinus tachycardia  Lungs: clear to auscultation. NLB on RA  Abdomen: soft, non distended, and  Focal tenderness RLQ/midline. Tender over kidney graft. The wound is well healed.  : no brooks  Extremities: edema: absent.   Neurologic: awake, alert and oriented. Tremor absent.  PIV  AV fistula +thrill.    Data:   CMP  Recent Labs   Lab 01/25/20  0656 01/24/20  0648 01/23/20  1301    137 137   POTASSIUM 4.2 4.1 4.6   CHLORIDE 112* 112* 108   CO2 20 17* 22   * 120* 109*   BUN 16 24 31*   CR 1.57* 1.47* 1.20*   GFRESTIMATED 45* 48* 62   GFRESTBLACK 52* 56* 72   SHAMIR 9.4 10.0 9.9   MAG 1.9 1.3*  --    PHOS 1.7* 2.0*  --    ALBUMIN  --   --  4.5   BILITOTAL  --   --  1.2   ALKPHOS  --   --  210*   AST  --   --  9   ALT  --   --  20     CBC  Recent Labs   Lab 01/25/20  0656 01/24/20  0648   HGB 8.1* 8.9*   WBC 7.5 11.4*   * 128*     COAGSNo lab results found in last 7 days.    Invalid input(s): XA   Urinalysis  Recent Labs   Lab Test 01/23/20  1421 12/20/19  1141 12/03/19  1215  09/03/19  0940   COLOR Yellow Straw Yellow   < >  --    APPEARANCE Clear Clear Clear   < >  --    URINEGLC Negative Negative Negative   < >  --    URINEBILI Negative Negative Negative   < >  --    URINEKETONE Negative Negative Negative   < >  --     SG 1.014 1.008 1.010   < >  --    UBLD Small* Large* Small*   < >  --    URINEPH 6.0 7.0 8.0*   < >  --    PROTEIN 30* Negative Negative   < >  --    NITRITE Negative Negative Negative   < >  --    LEUKEST Large* Trace* Negative   < >  --    RBCU 13* 2 1   < >  --    WBCU 12* 3 1   < >  --    UTPG  --   --  0.15  --  0.24*    < > = values in this interval not displayed.     Virology:  CMV DNA Quantitation Specimen   Date Value Ref Range Status   01/23/2020 EDTA PLASMA  Final     CMV IgG Antibody   Date Value Ref Range Status   12/26/2013 >10.00  Positive for anti-CMV IgG U/mL Final     EBV VCA IgG Antibody   Date Value Ref Range Status   12/26/2013 57.80 U/mL Final     Comment:     Positive, suggests immunologic exposure.     Hepatitis C Antibody   Date Value Ref Range Status   08/02/2019 Nonreactive NR^Nonreactive Final     Comment:     Assay performance characteristics have not been established for newborns,   infants, and children       Hep B Surface Noemy   Date Value Ref Range Status   12/12/2013 34.4  Final     Comment:     Reactive, Patient is considered to be immune to infection with hepatitis B   when   the value is greater than or equal to 12.0 mlU/mL.     BK Virus Result   Date Value Ref Range Status   01/06/2020 BK Virus DNA Not Detected BKNEG^BK Virus DNA Not Detected copies/mL Final   12/03/2019 BK Virus DNA Not Detected BKNEG^BK Virus DNA Not Detected copies/mL Final   12/02/2019 BK Virus DNA Not Detected BKNEG^BK Virus DNA Not Detected copies/mL Final   11/04/2019 BK Virus DNA Not Detected BKNEG^BK Virus DNA Not Detected copies/mL Final   10/07/2019 BK Virus DNA Not Detected BKNEG^BK Virus DNA Not Detected copies/mL Final   09/17/2019 BK Virus DNA Not Detected BKNEG^BK Virus DNA Not Detected copies/mL Final   09/03/2019 BK Virus DNA Not Detected BKNEG^BK Virus DNA Not Detected copies/mL Final     Attestation:    The patient has been seen and evaluated by me.   Vital signs, labs, medications and  orders were reviewed.   When obtained, diagnostic images were reviewed by me and interpreted as above.    The care plan was discussed with the multidisciplinary team and I agree with the findings and plan in this note, with any differences recorded in blue.    Immunosuppressive medication management was reviewed and adjusted as reflected in the note and orders.     .

## 2020-01-26 ENCOUNTER — DOCUMENTATION ONLY (OUTPATIENT)
Dept: CARE COORDINATION | Facility: CLINIC | Age: 30
End: 2020-01-26

## 2020-01-26 VITALS
TEMPERATURE: 98.2 F | RESPIRATION RATE: 18 BRPM | OXYGEN SATURATION: 100 % | SYSTOLIC BLOOD PRESSURE: 111 MMHG | DIASTOLIC BLOOD PRESSURE: 71 MMHG | HEART RATE: 94 BPM | BODY MASS INDEX: 27.41 KG/M2 | HEIGHT: 60 IN | WEIGHT: 139.6 LBS

## 2020-01-26 PROBLEM — R19.7 DIARRHEA: Status: ACTIVE | Noted: 2020-01-26

## 2020-01-26 LAB
ANION GAP SERPL CALCULATED.3IONS-SCNC: 6 MMOL/L (ref 3–14)
BASOPHILS # BLD AUTO: 0 10E9/L (ref 0–0.2)
BASOPHILS NFR BLD AUTO: 0.5 %
BUN SERPL-MCNC: 9 MG/DL (ref 7–30)
CALCIUM SERPL-MCNC: 9.4 MG/DL (ref 8.5–10.1)
CHLORIDE SERPL-SCNC: 116 MMOL/L (ref 94–109)
CO2 SERPL-SCNC: 20 MMOL/L (ref 20–32)
CREAT SERPL-MCNC: 1.28 MG/DL (ref 0.52–1.04)
DIFFERENTIAL METHOD BLD: ABNORMAL
EOSINOPHIL # BLD AUTO: 0 10E9/L (ref 0–0.7)
EOSINOPHIL NFR BLD AUTO: 1 %
ERYTHROCYTE [DISTWIDTH] IN BLOOD BY AUTOMATED COUNT: 12.7 % (ref 10–15)
GFR SERPL CREATININE-BSD FRML MDRD: 57 ML/MIN/{1.73_M2}
GLUCOSE SERPL-MCNC: 137 MG/DL (ref 70–99)
HCT VFR BLD AUTO: 26.3 % (ref 35–47)
HGB BLD-MCNC: 8.2 G/DL (ref 11.7–15.7)
IMM GRANULOCYTES # BLD: 0 10E9/L (ref 0–0.4)
IMM GRANULOCYTES NFR BLD: 0.7 %
LYMPHOCYTES # BLD AUTO: 0.5 10E9/L (ref 0.8–5.3)
LYMPHOCYTES NFR BLD AUTO: 12.3 %
MAGNESIUM SERPL-MCNC: 1.4 MG/DL (ref 1.6–2.3)
MCH RBC QN AUTO: 28.3 PG (ref 26.5–33)
MCHC RBC AUTO-ENTMCNC: 31.2 G/DL (ref 31.5–36.5)
MCV RBC AUTO: 91 FL (ref 78–100)
MONOCYTES # BLD AUTO: 0.4 10E9/L (ref 0–1.3)
MONOCYTES NFR BLD AUTO: 10.1 %
NEUTROPHILS # BLD AUTO: 3.1 10E9/L (ref 1.6–8.3)
NEUTROPHILS NFR BLD AUTO: 75.4 %
NRBC # BLD AUTO: 0 10*3/UL
NRBC BLD AUTO-RTO: 0 /100
PHOSPHATE SERPL-MCNC: 2.7 MG/DL (ref 2.5–4.5)
PLATELET # BLD AUTO: 118 10E9/L (ref 150–450)
POTASSIUM SERPL-SCNC: 3.9 MMOL/L (ref 3.4–5.3)
RBC # BLD AUTO: 2.9 10E12/L (ref 3.8–5.2)
SODIUM SERPL-SCNC: 142 MMOL/L (ref 133–144)
WBC # BLD AUTO: 4.2 10E9/L (ref 4–11)

## 2020-01-26 PROCEDURE — 25800025 ZZH RX 258: Performed by: PHYSICIAN ASSISTANT

## 2020-01-26 PROCEDURE — 25000131 ZZH RX MED GY IP 250 OP 636 PS 637: Mod: GY | Performed by: TRANSPLANT SURGERY

## 2020-01-26 PROCEDURE — 25000128 H RX IP 250 OP 636: Performed by: PHYSICIAN ASSISTANT

## 2020-01-26 PROCEDURE — 25000132 ZZH RX MED GY IP 250 OP 250 PS 637: Mod: GY | Performed by: PHYSICIAN ASSISTANT

## 2020-01-26 PROCEDURE — 36415 COLL VENOUS BLD VENIPUNCTURE: CPT | Performed by: PHYSICIAN ASSISTANT

## 2020-01-26 PROCEDURE — 83735 ASSAY OF MAGNESIUM: CPT | Performed by: PHYSICIAN ASSISTANT

## 2020-01-26 PROCEDURE — 85025 COMPLETE CBC W/AUTO DIFF WBC: CPT | Performed by: PHYSICIAN ASSISTANT

## 2020-01-26 PROCEDURE — 25000132 ZZH RX MED GY IP 250 OP 250 PS 637: Mod: GY | Performed by: SURGERY

## 2020-01-26 PROCEDURE — 25000125 ZZHC RX 250: Performed by: STUDENT IN AN ORGANIZED HEALTH CARE EDUCATION/TRAINING PROGRAM

## 2020-01-26 PROCEDURE — 80048 BASIC METABOLIC PNL TOTAL CA: CPT | Performed by: PHYSICIAN ASSISTANT

## 2020-01-26 PROCEDURE — 84100 ASSAY OF PHOSPHORUS: CPT | Performed by: PHYSICIAN ASSISTANT

## 2020-01-26 PROCEDURE — 25800030 ZZH RX IP 258 OP 636: Performed by: STUDENT IN AN ORGANIZED HEALTH CARE EDUCATION/TRAINING PROGRAM

## 2020-01-26 PROCEDURE — 25000131 ZZH RX MED GY IP 250 OP 636 PS 637: Mod: GY | Performed by: PHYSICIAN ASSISTANT

## 2020-01-26 RX ORDER — AMPICILLIN 2 G/1
2 INJECTION, POWDER, FOR SOLUTION INTRAVENOUS EVERY 8 HOURS
Status: DISCONTINUED | OUTPATIENT
Start: 2020-01-26 | End: 2020-01-26

## 2020-01-26 RX ORDER — AMOXICILLIN 500 MG/1
500 CAPSULE ORAL EVERY 8 HOURS
Qty: 54 CAPSULE | Refills: 0 | Status: SHIPPED | OUTPATIENT
Start: 2020-01-26 | End: 2020-02-19

## 2020-01-26 RX ORDER — AMOXICILLIN 500 MG/1
500 CAPSULE ORAL EVERY 8 HOURS SCHEDULED
Status: DISCONTINUED | OUTPATIENT
Start: 2020-01-26 | End: 2020-01-26 | Stop reason: HOSPADM

## 2020-01-26 RX ORDER — LOPERAMIDE HCL 2 MG
2 CAPSULE ORAL 4 TIMES DAILY PRN
Qty: 60 CAPSULE | Refills: 1 | Status: SHIPPED | OUTPATIENT
Start: 2020-01-26 | End: 2020-04-16

## 2020-01-26 RX ORDER — AMPICILLIN 2 G/1
2 INJECTION, POWDER, FOR SOLUTION INTRAVENOUS EVERY 12 HOURS
Status: DISCONTINUED | OUTPATIENT
Start: 2020-01-26 | End: 2020-01-26

## 2020-01-26 RX ADMIN — MYCOPHENOLIC ACID 540 MG: 360 TABLET, DELAYED RELEASE ORAL at 07:49

## 2020-01-26 RX ADMIN — DEXTROSE AND SODIUM CHLORIDE: 5; 450 INJECTION, SOLUTION INTRAVENOUS at 01:54

## 2020-01-26 RX ADMIN — AMOXICILLIN 500 MG: 500 CAPSULE ORAL at 14:55

## 2020-01-26 RX ADMIN — LOPERAMIDE HYDROCHLORIDE 2 MG: 2 CAPSULE ORAL at 03:50

## 2020-01-26 RX ADMIN — ATORVASTATIN CALCIUM 10 MG: 10 TABLET, FILM COATED ORAL at 07:50

## 2020-01-26 RX ADMIN — POTASSIUM PHOSPHATE, MONOBASIC AND POTASSIUM PHOSPHATE, DIBASIC 20 MMOL: 224; 236 INJECTION, SOLUTION INTRAVENOUS at 01:54

## 2020-01-26 RX ADMIN — LOPERAMIDE HYDROCHLORIDE 2 MG: 2 CAPSULE ORAL at 11:50

## 2020-01-26 RX ADMIN — PIPERACILLIN AND TAZOBACTAM 3.38 G: 3; .375 INJECTION, POWDER, FOR SOLUTION INTRAVENOUS at 05:30

## 2020-01-26 RX ADMIN — SODIUM BICARBONATE 650 MG TABLET 1950 MG: at 07:47

## 2020-01-26 RX ADMIN — ASPIRIN 81 MG CHEWABLE TABLET 81 MG: 81 TABLET CHEWABLE at 07:47

## 2020-01-26 RX ADMIN — AMPICILLIN SODIUM 2 G: 2 INJECTION, POWDER, FOR SOLUTION INTRAMUSCULAR; INTRAVENOUS at 09:58

## 2020-01-26 RX ADMIN — TACROLIMUS 7 MG: 5 CAPSULE ORAL at 07:49

## 2020-01-26 RX ADMIN — LEVOTHYROXINE SODIUM 25 MCG: 25 TABLET ORAL at 07:50

## 2020-01-26 ASSESSMENT — ACTIVITIES OF DAILY LIVING (ADL)
ADLS_ACUITY_SCORE: 11

## 2020-01-26 NOTE — PROGRESS NOTES
On Call Care Coordinator  Note    Admission Date/Time:  1/23/2020  Attending MD:  Ramsey Herrera MD    Data  Chart reviewed, discussed with interdisciplinary team.   Patient was admitted for:    Pyelonephritis  Kidney replaced by transplant.     Concerns with insurance coverage for discharge needs: None.  Current Living Situation: Patient lives with spouse.  Support System: Supportive and Involved  Services Involved: None currently.  Pt stated she has used [facility] home care after her transplant but wasn't happy with the service.  Transportation at Discharge: Family or friend will provide  Barriers to Discharge: Home infusion arrangement.    Assessment  The team reported pt might be ready to go home today and will need home infusion service arranged for IV abx.  Pt will be going home with IV ampicillin q 8hrs for 2 and half weeks.     Coordination of Care and Referrals: Provided patient/family with options for Home Infusion.       RNCC visited pt to introduce RNCC role and discuss about the discharge plan.  RNCC discussed about home IV abx.  Pt stated the only time she had IV abx is when she was receiving HD and it was at the dialysis center.  Pt agreed to have referral send to [facility] to stay in the [facility] system.  Referral send to [facility].  [facility] reported due to being Sunday they will not be able to run pt insurance benefit check and will not be able to find a home care agency for same day RN visit.  [facility] RN agreed to have her insurance check Monday morning and contact the floor care coordinator with benefit and home RN info.  Pt need to have PICC line placed and education prior discharge.  [facility] doesn't have opening for today.  RNCC LVM for [facility] to check opening for tomorrow, 1/27.  The above info have been shared with pt, the team and charge nurse.      Plan  Anticipated Discharge Date:  Tomorrow, 1/27/20  Anticipated Discharge Plan:   Home with home infusion service.  [facility] will provide the service.  [facility] will  contact the week days care coordinator with the benefit and RN info on Monday morning, 1/27.    ADDENDUM:  The team reported, pt IV abx will be changed to PO and no need for Home infusion service.  Pt have been updated.  No home care service needs. Pt declined for any other discharge assistance need.    Jaden Alvarez RN, PHN, BSN  4A and 4E/ ICU  Care Coordinator  Phone: [phone]  Pager: [phone]

## 2020-01-26 NOTE — PROGRESS NOTES
[facility], [place]   Transplant Nephrology Progress Note  Date of Admission:  1/23/2020  Today's Date: 01/26/2020    Assessment & Plan   # DDKT: Trend down   - Baseline Cr ~ 1.1-1.3   - Proteinuria: Minimal (0.2-0.5 grams)   - Date DSA Last Checked: Dec/2019      Latest DSA: No   - BK Viremia: No   - Kidney Tx Biopsy: Sep 17, 2019; Result: No evidence of rejection    Cr 1.27mg/dL. She denies dysuria. Urology consulted, they will plan for stent removal 1/31/20 as an outpatient.     # Urinary tract infection: Enterococcus spp, pansensitive. Getting a PICC line today, will be discharged home with amoxicillin 500mg q8h for 3 weeks total duration of abx therapy.      # Immunosuppression: Tacrolimus immediate release (goal 8-10) and Mycophenolic acid (goal 1.0-3.5)   - Changes: No, get level with labs on Monday    # Infection Prophylaxis:   - PJP: Inhaled pentamidine    # Blood Pressure: Controlled;  Goal BP: < 130/80   - Volume status: Euvolemic    - Changes: No    # Elevated Blood Glucose: Glucose generally running ~ 120-150   - Management as per primary team.    # Anemia in Chronic Renal Disease: Hgb: Stable      CHARLINE: Yes   - Iron studies: Replete    # Mineral Bone Disorder:   - Secondary renal hyperparathyroidism; PTH level: Moderately elevated (301-600 pg/ml)        On treatment: Cinacalcet  - Vitamin D; level: Low        On Supplement: No  - Calcium; level: Normal        On Supplement: No  - Phosphorus; level: Normal        On Supplement: No    # Electrolytes:   - Potassium; level: Normal        On Supplement: No  - Magnesium; level: Low        On Supplement: No  - Bicarbonate; level: Normal        On Supplement: Yes    # Transplant History:  Etiology of Kidney Failure: IgA nephropathy  Tx: DDKT  Transplant: 8/3/2019 (Kidney)  Donor Type: Donation after Circulatory Death  Donor Class: Standard Criteria Donor  Crossmatch at time of Tx: negative  DSA at time of Tx: No  Significant changes  in immunosuppression: None  Significant transplant-related complications: None    Recommendations were communicated to the primary team verbally.    Seen and discussed with Dr. Sergio Linda MD   Pager: [phone]    Attestation:  This patient has been seen and evaluated by me, Nakul Hill MD.  I have reviewed the note and agree with plan of care as documented by the fellow.       Interval History   No fever overnight. She denies chills or rigors. She is walking around her room independently. She has no abdominal pain, and denies chest pain or shortness of breath. She has no diarrhea. She denies dysuria. Plan for discharge today    Review of Systems   4 point ROS was obtained and negative except as noted in the Interval History.    MEDICATIONS:    amoxicillin  500 mg Oral Q8H ALJEANDRO     aspirin  81 mg Oral Daily     atorvastatin  10 mg Oral Daily     cinacalcet  30 mg Oral Daily     [START ON 1/27/2020] fludrocortisone  0.1 mg Oral Once per day on Mon Thu     levothyroxine  37.5 mcg Oral Q Mon Wed Fri AM     levothyroxine  25 mcg Oral Once per day on Sun Tue Thu Sat     mycophenolic acid  540 mg Oral BID IS     norethindrone  0.35 mg Oral Daily     sodium bicarbonate  1,950 mg Oral BID     sodium chloride (PF)  3 mL Intracatheter Q8H     tacrolimus  7 mg Oral BID IS       dextrose 5% and 0.45% NaCl 125 mL/hr at 01/26/20 0154     Physical Exam      /63   Pulse 87   Temp 98  F (36.7  C) (Oral)   Resp 18   Ht 1.524 m (5')   Wt 63.3 kg (139 lb 9.6 oz)   LMP 11/11/2019   SpO2 98%   BMI 27.26 kg/m       GENERAL APPEARANCE: alert and no distress  HENT: mouth without ulcers or lesions  LYMPHATICS: no cervical or supraclavicular nodes  RESP: lungs clear to auscultation - no rales, rhonchi or wheezes  CV: regular rhythm, normal rate, no rub, no murmur  EDEMA: no LE edema bilaterally  ABDOMEN: soft, nondistended, nontender, bowel sounds normal  MS: extremities normal - no gross deformities noted, no  evidence of inflammation in joints, no muscle tenderness  SKIN: no rash  ACCESS: HERMINIAE AVF with good thrill  Allograft: Non -tender    Data   All labs reviewed by me.  CMP  Recent Labs   Lab 01/26/20  0627 01/25/20  0656 01/24/20  0648 01/23/20  1301    138 137 137   POTASSIUM 3.9 4.2 4.1 4.6   CHLORIDE 116* 112* 112* 108   CO2 20 20 17* 22   ANIONGAP 6 5 8 7   * 157* 120* 109*   BUN 9 16 24 31*   CR 1.28* 1.57* 1.47* 1.20*   GFRESTIMATED 57* 45* 48* 62   GFRESTBLACK 66 52* 56* 72   SHAMIR 9.4 9.4 10.0 9.9   MAG 1.4* 1.9 1.3*  --    PHOS 2.7 1.7* 2.0*  --    PROTTOTAL  --   --   --  8.1   ALBUMIN  --   --   --  4.5   BILITOTAL  --   --   --  1.2   ALKPHOS  --   --   --  210*   AST  --   --   --  9   ALT  --   --   --  20     CBC  Recent Labs   Lab 01/26/20  0627 01/25/20  0656 01/24/20  0648 01/23/20  1301   HGB 8.2* 8.1* 8.9* 10.4*   WBC 4.2 7.5 11.4* 11.2*   RBC 2.90* 2.86* 3.09* 3.60*   HCT 26.3* 26.2* 28.5* 33.7*   MCV 91 92 92 94   MCH 28.3 28.3 28.8 28.9   MCHC 31.2* 30.9* 31.2* 30.9*   RDW 12.7 12.5 12.4 12.2   * 107* 128* 179     INRNo lab results found in last 7 days.  ABGNo lab results found in last 7 days.   Urine Studies  Recent Labs   Lab Test 01/23/20  1421 12/20/19  1141 12/03/19  1215 11/22/19  1055   COLOR Yellow Straw Yellow Yellow   APPEARANCE Clear Clear Clear Clear   URINEGLC Negative Negative Negative Negative   URINEBILI Negative Negative Negative Negative   URINEKETONE Negative Negative Negative Negative   SG 1.014 1.008 1.010 1.012   UBLD Small* Large* Small* Small*   URINEPH 6.0 7.0 8.0* 7.0   PROTEIN 30* Negative Negative Negative   NITRITE Negative Negative Negative Negative   LEUKEST Large* Trace* Negative Negative   RBCU 13* 2 1 <1   WBCU 12* 3 1 1     Recent Labs   Lab Test 12/03/19  1215 09/03/19  0940 12/11/13  1915   UTPG 0.15 0.24* 7.42*     PTH  Recent Labs   Lab Test 12/03/19  1112 08/09/19  0559 12/11/13  0601   PTHI 210* 358* 1,131*     Iron Studies  Recent Labs    Lab Test 12/03/19  1112 09/03/19  0944 08/30/19  1700 08/12/19  0619 12/29/16  1338 12/11/13  0600   IRON 47 87 138 96 137 65   * 254 270 162* 228* 245   IRONSAT 23 34 51* 59* 60* 26   MONET 1,003* 1,535* 1,746*  --  1,039*  --      IMAGING:  All imaging studies reviewed by me.

## 2020-01-26 NOTE — DISCHARGE SUMMARY
[facility], [place]    Discharge Summary  Transplant Surgery    Date of Admission:  1/23/2020  Date of Discharge:  1/26/2020  3:34 PM  Discharging Provider: Corinna Toledo PA-C/ Dr. Herrera  Date of Service (when I saw the patient): 1/26/20    Discharge Diagnoses   Principal Problem:    Pyelonephritis  Active Problems:    Immunosuppression (H)    Kidney replaced by transplant    Diarrhea      Procedure/Surgery Information   Procedure:    Surgeon(s): * Surgery not found *         History of Present Illness   Mona Wagner is a 29 year old female who presents with a history of ESRD s/p kidney transplant (08/03/19) complicated by hydronephrosis s/p transplant cystourethroscopy with retrograde pyelography, ureteroscopy, and placement of ureteral stent, no filling defects or strictures of transplanted ureter were found (12/6/20), DVT, HTN who presented to the Emergency Department for evaluation of chills, body aches, and headache. Concern for pyelonephritis given pain over graft and UA with WBC 12 and leukocyte esterase.     Hospital Course   S/p kidney transplant 8/3/19: Pyelonephritis and CAMERON secondary to infection, pre renal.   -CAMERON: Cr improved to around baseline, 1.28 by the day of discharge. 1/23/20 Renal transplant US showed resolution of prior hydronephrosis. Cr 1.1-1.2 after ureteral stent placed on 12/6.   -Pyelonephritis: Pain over graft and urine culture grew enterococcus faecalis (pan sensitive). Will discharge on oral amoxicillin to complete a total of 3 weeks of antibiotics. Consulted urology regarding stent, wanted her infection to be treated prior to removal, will follow up with patient as an outpatient.    Diarrhea, occurring after starting antibiotic. Hold magnesium as this causes loose stools per patient. Imodium PRN.    Immunosuppression management: PTA myfortic 540 mg BID and tacrolimus 7 mg BID, goal 8-10.  PJP: pentamidine (received 12/20), CMV: IgG R+/D+.      Hematology: Hgb 8.1. Decreased from ~ 10, likely due to dilution. No signs of bleeding. Check LDH and haptoglobin. Repeat CBC tomorrow.     PTA florinef 2 times a week (Tuesday and Thursday) ordered.     Transplant coordinator: Melani Vazquez [phone]    Antibiotics prescribed at discharge: Amoxicillin, 18 additional days    Discharge Disposition   Discharged to home   Condition at discharge: Stable    Pending Results   These results will be followed up by transplant coordinator  Unresulted Labs Ordered in the Past 30 Days of this Admission     Date and Time Order Name Status Description    1/24/2020 0857 Viral Culture Respiratory In process     1/23/2020 1625 Blood culture yeast - Site # 2 Preliminary     1/23/2020 1604 Blood culture yeast Preliminary     1/23/2020 1254 Blood culture Preliminary     1/23/2020 1254 Blood culture Preliminary         Primary Care Physician   Macarena Bowen    Physical Exam                      Vitals:    01/23/20 2145 01/24/20 1529 01/25/20 0500   Weight: 62.5 kg (137 lb 11.2 oz) 63.5 kg (139 lb 15.9 oz) 63.3 kg (139 lb 9.6 oz)     Vital Signs with Ranges     I/O last 3 completed shifts:  In: 470 [P.O.:470]  Out: 250 [Urine:250]    Constitutional: Awake, alert, cooperative, no apparent distress, and appears stated age.  Eyes: Pupils equal, round, sclera clear, conjunctiva normal.  ENT: Normocephalic, without obvious abnormality, atraumatic  Respiratory: Nonlabored resps on RA  Cardiovascular: RRR  GI: Soft, non-distended, non-tender  Genitourinary:  Voiding  Neurologic: Awake, alert, oriented to name, place and time.    Neuropsychiatric: Calm, normal eye contact, alert, normal affect, oriented to self, place, time and situation, memory for past and recent events intact and thought process normal.    Consultations This Hospital Stay   VASCULAR ACCESS CARE ADULT IP CONSULT  VASCULAR ACCESS CARE ADULT IP CONSULT  SOCIAL WORK IP CONSULT  UROLOGY IP CONSULT    Time Spent  on this Encounter   I have spent greater than 30 minutes on this discharge.    Discharge Orders   Discharge Medications   Discharge Medication List as of 1/26/2020  1:23 PM      START taking these medications    Details   amoxicillin (AMOXIL) 500 MG capsule Take 1 capsule (500 mg) by mouth every 8 hours for 18 days, Disp-54 capsule, R-0, E-Prescribe      loperamide (IMODIUM) 2 MG capsule Take 1 capsule (2 mg) by mouth 4 times daily as needed for diarrhea, Disp-60 capsule, R-1, E-Prescribe      menthol-zinc oxide (CALMOSEPTINE) 0.44-20.6 % OINT ointment Apply topically 4 times daily as needed for skin protection or irritationDisp-226 g, [id number]         CONTINUE these medications which have NOT CHANGED    Details   acetaminophen (TYLENOL) 325 MG tablet Take 2 tablets (650 mg) by mouth every 4 hours as needed for mild pain, Disp-100 tablet, R-3, E-Prescribe      aspirin (ASA) 81 MG chewable tablet Take 1 tablet (81 mg) by mouth daily, Disp-30 tablet, R-5, E-Prescribe      atorvastatin (LIPITOR) 10 MG tablet Take 1 tablet (10 mg) by mouth daily, Disp-90 tablet, R-0, E-Prescribe      cinacalcet (SENSIPAR) 30 MG tablet Take 1 tablet (30 mg) by mouth daily, Disp-30 tablet, R-2, E-Prescribe      diphenhydrAMINE (BENADRYL) 25 MG tablet Take 1-2 tablets (25-50 mg) by mouth every 6 hours as needed for itching or allergies, Disp-60 tablet, R-1, E-Prescribe      EPINEPHrine (EPIPEN/ADRENACLICK/OR ANY BX GENERIC EQUIV) 0.3 MG/0.3ML injection 2-pack 0.3 mg, Historical      fludrocortisone (FLORINEF) 0.1 MG tablet Take 0.1 mg by mouth twice a week On Tuesday and Thursday, Disp-180 tablet, R-3, Historical      hydrOXYzine (ATARAX) 10 MG tablet Take 1 tablet (10 mg) by mouth 3 times daily as needed for anxiety, Disp-20 tablet, R-0, E-Prescribe      levothyroxine (SYNTHROID/LEVOTHROID) 25 MCG tablet Take 1 tablet (25 mcg) Tuesday, Thursday, Saturday and Sunday and 1.5 tablets (37.5 mcg) on Monday, Wednesday and Friday every  week., Disp-105 tablet, R-1, E-Prescribe      magnesium oxide (MAG-OX) 400 MG tablet Take 1 tablet (400 mg) by mouth 2 times daily, Disp-180 tablet, R-0, E-Prescribe      MYFORTIC (BRAND) 180 MG EC tablet Take 3 tablets (540 mg) by mouth 2 times daily, Disp-180 tablet, R-11, E-Prescribe      norethindrone (MICRONOR) 0.35 MG tablet Do not restart until your follow up appointment with your surgeon. Discuss restart date at that appointment., Disp-84 tablet, R-3, E-Prescribe      pentamidine (NEBUPENT) 300 MG neb solution Inhale 300 mg into the lungs every 28 days, No Print Out      psyllium (METAMUCIL/KONSYL) capsule Take 1 capsule by mouth daily, Disp-90 capsule, R-3, E-PrescribeNDC covered: [id number], [id number], [id number], [id number]      simethicone (MYLICON) 125 MG chewable tablet Take 1 tablet (125 mg) by mouth 4 times daily as needed for intestinal gas, Disp-120 tablet, R-0, E-Prescribe      sodium bicarbonate 650 MG tablet TAKE 3 TABLETS(1950 MG) BY MOUTH TWICE DAILY, Disp-180 tablet, R-0, E-Prescribe      tacrolimus (GENERIC EQUIVALENT) 1 MG capsule Total dose = 7 mg BID, Disp-420 capsule, R-11, E-PrescribeTXP DT 8/3/2019 (Kidney) TXP Dischg DT 8/12/2019 DXZ94.0 [facility]      tacrolimus (GENERIC EQUIVALENT) 5 MG capsule Take 1 capsule (5 mg) by mouth 2 times daily Total dose = 7 mg BID, Disp-60 capsule, R-11, E-PrescribeTXP DT 8/3/2019 (Kidney) TXP Dischg DT 8/12/2019 DX Z94.0 [facility]         STOP taking these medications       tacrolimus (GENERIC EQUIVALENT) 0.5 MG capsule Comments:   Reason for Stopping:                  Reason for your hospital stay    Patient was admitted with graft pyelonephritis (enterococcus faecalis) and will discharge on an extended course of oral antibiotics.     Adult [facility] Follow-up and recommended labs and tests    Transplant labs to be drawn weekly on Mondays: CBC, BMP, mag, phos, and  tacrolimus levels.    Follow up with transplant nephrology in 1-2 weeks.    Follow up with Dr. Britt as scheduled on 1/31.    Appointments on [place] and/or [facility] (with [facility] or [facility] provider or service). Call [phone] if you haven't heard regarding these appointments within 7 days of discharge.     Activity    Your activity upon discharge: activity as tolerated     When to contact your care team    Call your transplant coordinator at [phone] if you have any of the following: sustained temperature greater than 100.4 or isolated fever spike to 101.5, increased pain over graft  or difficulty obtaining or taking your transplant medications.    If it is outside of office hours, please call the hospital switchboard at [phone] and ask to have the transplant surgery fellow paged for urgent medical questions.     Diet    Follow this diet upon discharge: Regular Diet Adult         Data   Most Recent 3 Creatinines:  Recent Labs   Lab Test 01/27/20  0900 01/26/20  0627 01/25/20  0656   CR 1.14* 1.28* 1.57*      Attestation:    The patient has been seen and evaluated by me.   Vital signs, labs, medications and orders were reviewed.   When obtained, diagnostic images were reviewed by me and interpreted as above.    The care plan was discussed with the multidisciplinary team and I agree with the findings and plan in this note, with any differences recorded in blue.    Immunosuppressive medication management was reviewed and adjusted as reflected in the note and orders.     .

## 2020-01-26 NOTE — PLAN OF CARE
BP 98/62 (BP Location: Right arm)   Pulse 92   Temp 98.8  F (37.1  C) (Oral)   Resp 16   Ht 1.524 m (5')   Wt 63.3 kg (139 lb 9.6 oz)   LMP 11/11/2019   SpO2 99%   BMI 27.26 kg/m      Shift: 1500 - 2300  VS: Afebrile. BPs soft. OVSS on RA.    Neuro: A&O  Mobility: ind  Pain/Nausea/Vomiting: Denies  Endocrine: N/A  Diet: Regular, fair appetite.  LDAs: PIV with D5 1/2NS @ 125ml/hr  Skin: Intact  GI/: Voiding, loose BM x5 this shift. Calmoseptine ointment ordered for irritation d/t diarrhea.  Tests/Procedures: None  Labs: Phos 1.7, IV replacement ordered, will administer after IV abx  Plan: Will continue to monitor and notify of any changes.

## 2020-01-26 NOTE — PLAN OF CARE
VS: /65   Pulse 92   Temp 98.1  F (36.7  C) (Oral)   Resp 16   Ht 1.524 m (5')   Wt 63.3 kg (139 lb 9.6 oz)   LMP 11/11/2019   SpO2 98%   BMI 27.26 kg/m      Pt. Reported pain with phosphorous replacement, discussed with on call, and changed to 1L diluted bag of 20 mmol and is running over 6 hours.     Current condition: Stable on RA  Neuro: WDL  Cardio: WDL  Respiratory:WDL   GI/: Voiding spontaneously, Multiple bouts of diarrhea   Skin: WDL, pt. states irration due to the diarrhea.   Diet: Regular diet.   LDA: RPIV, running phosphorous, currently, then D5 1/2 NS  Mobility:  UAL.  Pain: Denies.   PRN medications: Loperamide x1  Education:  Call light education.   Plan of Care: Will continue to monitor.

## 2020-01-27 ENCOUNTER — TELEPHONE (OUTPATIENT)
Dept: TRANSPLANT | Facility: CLINIC | Age: 30
End: 2020-01-27

## 2020-01-27 DIAGNOSIS — Z94.0 KIDNEY REPLACED BY TRANSPLANT: ICD-10-CM

## 2020-01-27 DIAGNOSIS — Z94.0 KIDNEY REPLACED BY TRANSPLANT: Primary | ICD-10-CM

## 2020-01-27 DIAGNOSIS — Z79.899 LONG TERM USE OF DRUG: ICD-10-CM

## 2020-01-27 PROBLEM — D84.9 IMMUNOSUPPRESSION (H): Status: ACTIVE | Noted: 2019-08-08

## 2020-01-27 LAB
ANION GAP SERPL CALCULATED.3IONS-SCNC: 4 MMOL/L (ref 3–14)
BUN SERPL-MCNC: 9 MG/DL (ref 7–30)
CALCIUM SERPL-MCNC: 9.4 MG/DL (ref 8.5–10.1)
CHLORIDE SERPL-SCNC: 112 MMOL/L (ref 94–109)
CO2 SERPL-SCNC: 23 MMOL/L (ref 20–32)
CREAT SERPL-MCNC: 1.14 MG/DL (ref 0.52–1.04)
EBV DNA # SPEC NAA+PROBE: NORMAL {COPIES}/ML
EBV DNA SPEC NAA+PROBE-LOG#: NORMAL {LOG_COPIES}/ML
ERYTHROCYTE [DISTWIDTH] IN BLOOD BY AUTOMATED COUNT: 12.6 % (ref 10–15)
GFR SERPL CREATININE-BSD FRML MDRD: 66 ML/MIN/{1.73_M2}
GLUCOSE SERPL-MCNC: 92 MG/DL (ref 70–99)
HAPTOGLOB SERPL-MCNC: 184 MG/DL (ref 32–197)
HCT VFR BLD AUTO: 26.4 % (ref 35–47)
HGB BLD-MCNC: 8.2 G/DL (ref 11.7–15.7)
MCH RBC QN AUTO: 28.5 PG (ref 26.5–33)
MCHC RBC AUTO-ENTMCNC: 31.1 G/DL (ref 31.5–36.5)
MCV RBC AUTO: 92 FL (ref 78–100)
PLATELET # BLD AUTO: 173 10E9/L (ref 150–450)
POTASSIUM SERPL-SCNC: 3.8 MMOL/L (ref 3.4–5.3)
RBC # BLD AUTO: 2.88 10E12/L (ref 3.8–5.2)
SODIUM SERPL-SCNC: 140 MMOL/L (ref 133–144)
TACROLIMUS BLD-MCNC: 8.1 UG/L (ref 5–15)
TME LAST DOSE: NORMAL H
WBC # BLD AUTO: 3.6 10E9/L (ref 4–11)

## 2020-01-27 PROCEDURE — 80197 ASSAY OF TACROLIMUS: CPT | Performed by: INTERNAL MEDICINE

## 2020-01-27 PROCEDURE — 80048 BASIC METABOLIC PNL TOTAL CA: CPT | Performed by: INTERNAL MEDICINE

## 2020-01-27 PROCEDURE — 85027 COMPLETE CBC AUTOMATED: CPT | Performed by: INTERNAL MEDICINE

## 2020-01-27 NOTE — TELEPHONE ENCOUNTER
Mona josé.  She will schedule Aranesp for 1/31/2020    Manjula Mckinnon RN   Anemia Services  [address] [address]   [email or web address]   Office : [phone]  Fax: [phone]

## 2020-01-27 NOTE — LETTER
OUTPATIENT LABORATORY TEST ORDER    Patient: Mona Wagner     Transplant Date: 8/3/2019  YOB: 1990    Ordered By: Dr. Dorian Johnson  MRN: [id number]     Issued Date/Time: 1/27/2020  10:04 AM         Expiration Date: 8/3/2020    Diagnosis: Kidney Transplant (ICD-10 Z94.0)    Aftercare following organ transplant (ICD-10 Z48.288)    Long term use of medications (ICD-10 Z79.899)      Lab results to be available on the same day drawn    Patient should release information to the [facility].     Please fax all results to [phone]    2 X week labs through 3/5/2020    CBC with platelets    Basic Metabolic Panel (Sodium, Potassium, Chloride, Creatinine, CO2, Urea Nitrogen, glucose, Calcium)    Tacrolimus/Prograf/ drug level  Labs monthly     Phosphorus, magnesium    BK (Polyoma Virus) PCR Quantitative/Plasma  **At month 7 only (3/2020)     DSA PRA (mailers provided by patient)     Urinalysis    Months 7-12 post-transplant (3/6/2020 - 8/3/2020)  Monthly    CBC with platelets    Basic Metabolic Panel (Sodium, Potassium, Chloride, Creatinine, CO2, Urea Nitrogen, glucose, Calcium)    Tacrolimus/Prograf/ drug level    BK (Polyoma Virus) PCR Quantitative/Plasma  **At month 10 only (6/2020)     Urinalysis                Labs to be drawn with clinic visits at [facility]    6 months post-transplant clinic visit    LFTs    Hemoglobin A1c    Uric Acid    Lipid panel    Urine protein/creatinine    Allosure    9 months post-transplant clinic visit    Urine protein/creatinine    Allosure    12 months post-transplant clinic visit (Fasting labs)    LFTs    Hemoglobin A1c    Uric Acid    Lipid panel    Urine protein/creatinine    DSA PRA    PTH    Vitamin D    Allosure      If you have any questions please call the Transplant Center at [phone] option 5. All lab results should be faxed to [phone]    .

## 2020-01-27 NOTE — PROGRESS NOTES
Patient has had a transplant within 6 months. Transplant coordinator should follow up with patient for post-hospital call.     Jenifer Meyer CMA   Post Hospital Discharge Team

## 2020-01-27 NOTE — TELEPHONE ENCOUNTER
Mona sent a MyChart note questioning frequency of labs after hospitalization, stating that Dr. Hill had requested labs 2x weekly.    RNCC confirmed that antibiotics are only oral.  Will send orders to preferred lab for labs 2x weekly for 1 month:  [facility] -   BMP, CBC, tacrolimus.  UA after completion of antibiotics (one time, due 2/17/2020).

## 2020-01-27 NOTE — PLAN OF CARE
DISCHARGE:  Patient with orders to discharge to home.     Education Provided:   Handouts - AVSS printed and discussed with patient.  Specialty Pharmacy - Medications filled and picked up at pharmacy  LDAs - PIV removed    Discharge instructions, medications & follow ups reviewed with patient. Copy of discharge summary given to patient. PIV removed.     Patient in stable condition. AVSS. Patient had no further questions regarding discharge instructions and medications. Patient transferred out by self & left with .  Plan for follow up on Friday, January 31, 2020.

## 2020-01-28 ENCOUNTER — TELEPHONE (OUTPATIENT)
Dept: TRANSPLANT | Facility: CLINIC | Age: 30
End: 2020-01-28

## 2020-01-28 NOTE — TELEPHONE ENCOUNTER
6 month Kidney and Pancreas Transplant Patient Phone Call    Congratulations on your 6 month post-transplant anniversary!    Please re-review with the patient how to contact the Transplant Center:  Best phone contact is [phone], as if the need is urgent, any care coordinator will be able to assist you.    Medications:  Your new tacrolimus goal level is 6-8.  Your level drawn 1/27 was 8.1, so no dose change at this time.   CMV mismatch: No.    Please complete medication reconciliation (including confirmation of any magnesium or phosphorous supplements) and ensure the patient has an up to date medication card at home.     *ALWAYS BRING YOUR MED CARD TO APPOINTMENTS.*    Please confirm if the patient is independent with medication set up and administration: Yes:  Date 1/29/2020.  If no, who helps the patient with their medications?    Have you missed any medication doses in the past week: No.  Have you missed any medication doses in the past month: No.  Contact your pharmacy first for refills.  CONTACT THE TRANSPLANT CENTER IF YOU RUN OUT OF YOUR IS MEDICATIONS.    Labs:  EBV mismatch: No.  Mona is up to date for both BK and DSA lab work.     General Transplant Recommendations:    Frequent reviews of the transplant handbook and / or [facility].    Lab review on MyChart.     It is now safe to resume your regular dental care.    Laurence Vazquez, RN, BSN, CCTC

## 2020-01-29 ENCOUNTER — PRE VISIT (OUTPATIENT)
Dept: UROLOGY | Facility: CLINIC | Age: 30
End: 2020-01-29

## 2020-01-29 LAB
BACTERIA SPEC CULT: NO GROWTH
BACTERIA SPEC CULT: NO GROWTH
Lab: NORMAL
Lab: NORMAL
SPECIMEN SOURCE: NORMAL
SPECIMEN SOURCE: NORMAL

## 2020-01-30 ENCOUNTER — TELEPHONE (OUTPATIENT)
Dept: TRANSPLANT | Facility: CLINIC | Age: 30
End: 2020-01-30

## 2020-01-30 DIAGNOSIS — Z94.0 KIDNEY REPLACED BY TRANSPLANT: ICD-10-CM

## 2020-01-30 DIAGNOSIS — Z79.899 LONG TERM USE OF DRUG: ICD-10-CM

## 2020-01-30 DIAGNOSIS — Z94.0 KIDNEY REPLACED BY TRANSPLANT: Primary | ICD-10-CM

## 2020-01-30 LAB
ANION GAP SERPL CALCULATED.3IONS-SCNC: 7 MMOL/L (ref 3–14)
BUN SERPL-MCNC: 18 MG/DL (ref 7–30)
CALCIUM SERPL-MCNC: 9.8 MG/DL (ref 8.5–10.1)
CHLORIDE SERPL-SCNC: 111 MMOL/L (ref 94–109)
CO2 SERPL-SCNC: 21 MMOL/L (ref 20–32)
CREAT SERPL-MCNC: 1.01 MG/DL (ref 0.52–1.04)
ERYTHROCYTE [DISTWIDTH] IN BLOOD BY AUTOMATED COUNT: 12.3 % (ref 10–15)
GFR SERPL CREATININE-BSD FRML MDRD: 76 ML/MIN/{1.73_M2}
GLUCOSE SERPL-MCNC: 96 MG/DL (ref 70–99)
HCT VFR BLD AUTO: 30.3 % (ref 35–47)
HGB BLD-MCNC: 9.5 G/DL (ref 11.7–15.7)
MCH RBC QN AUTO: 28.4 PG (ref 26.5–33)
MCHC RBC AUTO-ENTMCNC: 31.4 G/DL (ref 31.5–36.5)
MCV RBC AUTO: 91 FL (ref 78–100)
PLATELET # BLD AUTO: 280 10E9/L (ref 150–450)
POTASSIUM SERPL-SCNC: 4.3 MMOL/L (ref 3.4–5.3)
RBC # BLD AUTO: 3.34 10E12/L (ref 3.8–5.2)
SODIUM SERPL-SCNC: 138 MMOL/L (ref 133–144)
TACROLIMUS BLD-MCNC: 11.5 UG/L (ref 5–15)
TME LAST DOSE: NORMAL H
WBC # BLD AUTO: 3.5 10E9/L (ref 4–11)

## 2020-01-30 PROCEDURE — 80048 BASIC METABOLIC PNL TOTAL CA: CPT | Performed by: INTERNAL MEDICINE

## 2020-01-30 PROCEDURE — 80197 ASSAY OF TACROLIMUS: CPT | Performed by: INTERNAL MEDICINE

## 2020-01-30 PROCEDURE — 85027 COMPLETE CBC AUTOMATED: CPT | Performed by: INTERNAL MEDICINE

## 2020-01-31 ENCOUNTER — OFFICE VISIT (OUTPATIENT)
Dept: UROLOGY | Facility: CLINIC | Age: 30
End: 2020-01-31
Payer: MEDICARE

## 2020-01-31 ENCOUNTER — TELEPHONE (OUTPATIENT)
Dept: TRANSPLANT | Facility: CLINIC | Age: 30
End: 2020-01-31

## 2020-01-31 VITALS — HEART RATE: 84 BPM | SYSTOLIC BLOOD PRESSURE: 121 MMHG | DIASTOLIC BLOOD PRESSURE: 78 MMHG

## 2020-01-31 DIAGNOSIS — Z94.0 KIDNEY REPLACED BY TRANSPLANT: ICD-10-CM

## 2020-01-31 DIAGNOSIS — N13.39 OTHER HYDRONEPHROSIS: Primary | ICD-10-CM

## 2020-01-31 DIAGNOSIS — N13.5 STRICTURE OR KINKING OF URETER: ICD-10-CM

## 2020-01-31 RX ORDER — TACROLIMUS 5 MG/1
5 CAPSULE ORAL 2 TIMES DAILY
Qty: 60 CAPSULE | Refills: 11 | Status: SHIPPED | OUTPATIENT
Start: 2020-01-31 | End: 2020-02-07

## 2020-01-31 RX ORDER — TACROLIMUS 1 MG/1
1 CAPSULE ORAL 2 TIMES DAILY
Qty: 60 CAPSULE | Refills: 11 | Status: SHIPPED | OUTPATIENT
Start: 2020-01-31 | End: 2020-02-07

## 2020-01-31 RX ORDER — LIDOCAINE HYDROCHLORIDE 20 MG/ML
JELLY TOPICAL ONCE
Status: COMPLETED | OUTPATIENT
Start: 2020-01-31 | End: 2020-01-31

## 2020-01-31 RX ADMIN — LIDOCAINE HYDROCHLORIDE: 20 JELLY TOPICAL at 13:33

## 2020-01-31 ASSESSMENT — PAIN SCALES - GENERAL: PAINLEVEL: NO PAIN (0)

## 2020-01-31 NOTE — NURSING NOTE
Chief Complaint   Patient presents with     RECHECK     Cysto/stent removal     Cris Mcginnis, CMA

## 2020-01-31 NOTE — PATIENT INSTRUCTIONS
Follow up with Dr. Britt as needed.    It was a pleasure meeting with you today.  Thank you for allowing me and my team the privilege of caring for you today.  YOU are the reason we are here, and I truly hope we provided you with the excellent service you deserve.  Please let us know if there is anything else we can do for you so that we can be sure you are leaving completely satisfied with your care experience.        Cris Mcginnis, CMA

## 2020-01-31 NOTE — LETTER
2020     RE: Mona Wagner  [address]  [place] [address]     Dear Colleague,    Thank you for referring your patient, Mona Wagner, to the [facility] UROLOGY AND INST FOR PROSTATE AND UROLOGIC CANCERS at [facility]. Please see a copy of my visit note below.    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 Johnson c/b hydronephrosis, now s/p cystourethroscopy with retrograde pyelography, ureteroscopy and ureteral stent placement on 19. She was recently admitted - for pyelonephrosis and still has ~2 weeks remaining in her course of amoxicillin, but is symptomatically improved without fever, chills, dysuria, abdominal pain. US renal transplant  demonstrating resolution of previous hydronephrosis, likely resolved due to ureteral stent placement. Following discussion with patient's nephrologist Dr. Leija, plan to proceed with stent removal today.     Plan:  - Ureteral stent removal today  - Follow up with nephrology 2/3/20.  They will follow for any further hydronephrosis.  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 Johnson complicated by hydronephrosis s/p cystourethroscopy with retrograde pyelography, ureteroscopy and ureteral stent placement on 2019. Imaging did not show any evidence of filling defects or strictures. When I last saw her on 2019 her hydronephrosis had improved on images but CR remained  elevated. She presents today for follow-up.     Of note, she was admitted to the [facility] ED from 01/23-01/26/2020 with chills, body aches, pain over graft and headache. UC grew  enterococcus faecalis and she was discharged with a 3 week course of amoxicillin. Of note, while she was in the ED her CR improved to around baseline by discharge (1.28) and renal US on 01/23/2020 showed resolution or prior hydronephrosis.      Review of Systems:   Pertinent items are noted in HPI or as below, remainder of complete ROS is negative.      Physical Exam:   There were no vitals taken for this visit.  Constitutional: Alert, no acute distress  Cardio: well-perfused, no cyanosis or edema  Resp: breathing comfortably on room air  Psychiatric: Normal mood and affect  Gastrointestinal: Abdomen soft  : Deferred    Laboratory:   I reviewed all applicable laboratory and pathology data and went over findings with patient  Significant for     Lab Results   Component Value Date    CR 1.01 01/30/2020    CR 1.14 01/27/2020    CR 1.28 01/26/2020    CR 1.57 01/25/2020    CR 1.47 01/24/2020    CR 1.20 01/23/2020    CR 1.17 01/13/2020    CR 1.16 01/06/2020    CR 1.15 12/30/2019    CR 1.22 12/20/2019     Results for orders placed or performed during the hospital encounter of 01/23/20   UA with Microscopic   Result Value Ref Range    Color Urine Yellow     Appearance Urine Clear     Glucose Urine Negative NEG^Negative mg/dL    Bilirubin Urine Negative NEG^Negative    Ketones Urine Negative NEG^Negative mg/dL    Specific Gravity Urine 1.014 1.003 - 1.035    Blood Urine Small (A) NEG^Negative    pH Urine 6.0 5.0 - 7.0 pH    Protein Albumin Urine 30 (A) NEG^Negative mg/dL    Urobilinogen mg/dL Normal 0.0 - 2.0 mg/dL    Nitrite Urine Negative NEG^Negative    Leukocyte Esterase Urine Large (A) NEG^Negative    Source Midstream Urine     WBC Urine 12 (H) 0 - 5 /HPF    RBC Urine 13 (H) 0 - 2 /HPF    Squamous Epithelial /HPF Urine 1 0 - 1 /HPF     Transitional Epi <1 0 - 1 /HPF     Imaging:   I personally reviewed all applicable imaging and went over the below findings with patient.    US Renal Transplant    Narrative    EXAMINATION: US RENAL TRANSPLANT,  1/23/2020 3:23 PM     COMPARISON: Renal ultrasound 12/30/2019.    HISTORY: 29-year-old female. Tenderness over the right renal  transplant, fever, hx of stent, evaluate for hydronephrosis.    TECHNIQUE:  Grey-scale, color Doppler and spectral flow analysis.    FINDINGS:  The transplant kidney is located right lower quadrant, and measures  14.3 cm. Parenchyma is of normal thickness and echogenicity. No focal  lesions. Resolution of previously demonstrated hydronephrosis. No  perinephric fluid collection. Stent visualized within the ureter.    Renal artery flow:   173 cm/s cm/sec peak systolic at hilum.  198 cm/s peak systolic at anastomosis.  Arcuate artery resistive indices (upper to lower): 0.7, 0.7, 0.65    Renal Vein Flow:  39.7 cm/sat hilum.   95.3 cm/s at anastomosis.    Iliac artery flow:  137.8 cm/s peak systolic above anastomosis.  139.3 cm/s peak systolic below anastomosis.    Iliac vein flow:  Patent above and below the anastomosis.    Bladder: Partially distended.      Impression    IMPRESSION: Patent Doppler evaluation of the right lower quadrant  transplant kidney with resolution of prior hydronephrosis. Stent  visualized within the ureter.    I have personally reviewed the examination and initial interpretation  and I agree with the findings.    KASIE MORIN MD     Scribe Disclosure:  Walt HAYNES, MS3, [employer], am serving as a scribe to document services personally performed by Wally Britt MD at this visit, based upon the provider's statements to me. All documentation has been reviewed by the aforementioned provider prior to being entered into the official medical record.    Scribe Disclosure:  Karen HAYNES, am serving as a  scribe to document services personally performed by Wally Britt MD at this visit, based upon the provider's statements to me. All documentation has been reviewed by the aforementioned provider prior to being entered into the official medical record.     Walt Gonzalez and Karen Pineda served as the scribe for this patient's visit and documented my history and physical exam.  I performed the history and physical exam.  I have edited and agree with the note.  MARCUS Britt MD    Again, thank you for allowing me to participate in the care of your patient.      Sincerely,    Wally Britt MD

## 2020-01-31 NOTE — NURSING NOTE
Invasive Procedure Safety Checklist:    Procedure: Cysto/stent removal    Action: Complete sections and checkboxes as appropriate.    Pre-procedure:  1. Patient ID Verified with 2 identifiers (Estela and  or MRN) : YES    2. Procedure and site verified with patient/designee (when able) : YES    3. Accurate consent documentation in medical record : YES    4. H&P (or appropriate assessment) documented in medical record : NO  H&P must be up to 30 days prior to procedure an updated within 24 hours of                 Procedure as applicable.     5. Relevant diagnostic and radiology test results appropriately labeled and displayed as applicable : NO    6. Blood products, implants, devices, and/or special equipment available for the procedure as applicable : NO    7. Procedure site(s) marked with provider initials [Exclusions: ] : NO    8. Marking not required. Reason : Yes  Procedure does not require site marking    Time Out:     Time-Out performed immediately prior to starting procedure, including verbal and active participation of all team members addressing: YES    1. Correct patient identity.  2. Confirmed that the correct side and site are marked.  3. An accurate procedure to be done.  4. Agreement on the procedure to be done.  5. Correct patient position.  6. Relevant images and results are properly labeled and appropriately displayed.  7. The need to administer antibiotics or fluids for irrigation purposes during the procedure as applicable.  8. Safety precautions based on patient history or medication use.    During Procedure: Verification of correct person, site, and procedure occurs any time the responsibility for care of the patient is transferred to another member of the care team.    Cris Mcginnis, [facility]

## 2020-01-31 NOTE — TELEPHONE ENCOUNTER
Pt returning call from Coordinator   
RNCC called to let Mona know that Skyla from the genetics clinic will be calling her under the recommendation of Dr. Parra.     RNCC left VM with information above.   
no

## 2020-01-31 NOTE — PROGRESS NOTES
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 Johnson c/b hydronephrosis, now s/p cystourethroscopy with retrograde pyelography, ureteroscopy and ureteral stent placement on 19. She was recently admitted - for pyelonephrosis and still has ~2 weeks remaining in her course of amoxicillin, but is symptomatically improved without fever, chills, dysuria, abdominal pain. US renal transplant  demonstrating resolution of previous hydronephrosis, likely resolved due to ureteral stent placement. Following discussion with patient's nephrologist Dr. Leija, plan to proceed with stent removal today.     Plan:  - Ureteral stent removal today  - Follow up with nephrology 2/3/20.  They will follow for any further hydronephrosis.  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 Johnson complicated by hydronephrosis s/p cystourethroscopy with retrograde pyelography, ureteroscopy and ureteral stent placement on 2019. Imaging did not show any evidence of filling defects or strictures. When I last saw her on 2019 her hydronephrosis had improved on images but CR remained elevated. She presents today for follow-up.     Of note, she was admitted to the [facility] ED from -2020 with chills, body aches, pain over graft and headache. UC grew  enterococcus faecalis and she was discharged with a 3 week course of amoxicillin. Of note, while she was in the ED her CR improved to around  baseline by discharge (1.28) and renal US on 01/23/2020 showed resolution or prior hydronephrosis.      Review of Systems:   Pertinent items are noted in HPI or as below, remainder of complete ROS is negative.      CYSTOSCOPY PROCEDURE:  Sterile preparation and drape and an informed consent were performed.  A 16-Dutch flexible cystoscope was introduced via the urethra.  The urethra was open.  The bladder mucosa showed no evidence of any lesions or trabeculation.  There were no stones.  The stent was visualized and removed in entirety.  Antibiotic was given per clinic protocol.      Physical Exam:   There were no vitals taken for this visit.  Constitutional: Alert, no acute distress  Cardio: well-perfused, no cyanosis or edema  Resp: breathing comfortably on room air  Psychiatric: Normal mood and affect  Gastrointestinal: Abdomen soft  : Deferred    Laboratory:   I reviewed all applicable laboratory and pathology data and went over findings with patient  Significant for     Lab Results   Component Value Date    CR 1.01 01/30/2020    CR 1.14 01/27/2020    CR 1.28 01/26/2020    CR 1.57 01/25/2020    CR 1.47 01/24/2020    CR 1.20 01/23/2020    CR 1.17 01/13/2020    CR 1.16 01/06/2020    CR 1.15 12/30/2019    CR 1.22 12/20/2019     Results for orders placed or performed during the hospital encounter of 01/23/20   UA with Microscopic   Result Value Ref Range    Color Urine Yellow     Appearance Urine Clear     Glucose Urine Negative NEG^Negative mg/dL    Bilirubin Urine Negative NEG^Negative    Ketones Urine Negative NEG^Negative mg/dL    Specific Gravity Urine 1.014 1.003 - 1.035    Blood Urine Small (A) NEG^Negative    pH Urine 6.0 5.0 - 7.0 pH    Protein Albumin Urine 30 (A) NEG^Negative mg/dL    Urobilinogen mg/dL Normal 0.0 - 2.0 mg/dL    Nitrite Urine Negative NEG^Negative    Leukocyte Esterase Urine Large (A) NEG^Negative    Source Midstream Urine     WBC Urine 12 (H) 0 - 5 /HPF    RBC Urine 13 (H) 0 - 2 /HPF     Squamous Epithelial /HPF Urine 1 0 - 1 /HPF    Transitional Epi <1 0 - 1 /HPF     Imaging:   I personally reviewed all applicable imaging and went over the below findings with patient.    US Renal Transplant    Narrative    EXAMINATION: US RENAL TRANSPLANT,  1/23/2020 3:23 PM     COMPARISON: Renal ultrasound 12/30/2019.    HISTORY: 29-year-old female. Tenderness over the right renal  transplant, fever, hx of stent, evaluate for hydronephrosis.    TECHNIQUE:  Grey-scale, color Doppler and spectral flow analysis.    FINDINGS:  The transplant kidney is located right lower quadrant, and measures  14.3 cm. Parenchyma is of normal thickness and echogenicity. No focal  lesions. Resolution of previously demonstrated hydronephrosis. No  perinephric fluid collection. Stent visualized within the ureter.    Renal artery flow:   173 cm/s cm/sec peak systolic at hilum.  198 cm/s peak systolic at anastomosis.  Arcuate artery resistive indices (upper to lower): 0.7, 0.7, 0.65    Renal Vein Flow:  39.7 cm/sat hilum.   95.3 cm/s at anastomosis.    Iliac artery flow:  137.8 cm/s peak systolic above anastomosis.  139.3 cm/s peak systolic below anastomosis.    Iliac vein flow:  Patent above and below the anastomosis.    Bladder: Partially distended.      Impression    IMPRESSION: Patent Doppler evaluation of the right lower quadrant  transplant kidney with resolution of prior hydronephrosis. Stent  visualized within the ureter.    I have personally reviewed the examination and initial interpretation  and I agree with the findings.    KASIE MORIN MD     Scribe Disclosure:  I, Walt Gonzalez, MS3, [employer], am serving as a scribe to document services personally performed by Wally Britt MD at this visit, based upon the provider's statements to me. All documentation has been reviewed by the aforementioned provider prior to being entered into the official medical record.    Scribe  Disclosure:  I, Karen Pineda, am serving as a scribe to document services personally performed by Wally Britt MD at this visit, based upon the provider's statements to me. All documentation has been reviewed by the aforementioned provider prior to being entered into the official medical record.     Walt Gonzalez and Karen Pineda served as the scribe for this patient's visit and documented my history and physical exam.  I performed the history and physical exam.  I have edited and agree with the note.  MARCUS Britt MD

## 2020-01-31 NOTE — TELEPHONE ENCOUNTER
ISSUE:   Tacrolimus IR level 11.5 on 1/30/2020, goal 6-8, dose 7 mg BID.    PLAN:   Please call patient and confirm this was an accurate 12-hour trough. Verify Tacrolimus IR dose 7 mg BID. Confirm no new medications or illness. Confirm no missed doses. If accurate trough and accurate dose, decrease Tacrolimus IR dose to 6 mg BID and repeat labs as scheduled.     OUTCOME:   Spoke with patient, they confirm accurate trough level and current dose 7 mg BID. Patient confirmed dose change to 6 mg BID and to repeat labs as expected. Orders sent to preferred pharmacy for dose change and lab for repeat labs. Patient voiced understanding of plan.     Mona notes that her diarrhea re-started after stopping her imodium (stool studies negative).   RNCC advised okay to use imodium, but please also use probiotic (OTC) and fiber consistently.    Mona voiced understanding.

## 2020-01-31 NOTE — NURSING NOTE
The following medication was given:     MEDICATION:  Urojet  ROUTE: Urethral  SITE: Urethra  DOSE: 200 mg (20 mg/mL)  LOT #: [id number]  : [employer]  EXPIRATION DATE: 09/2020  NDC#: [address]-1   Was there drug waste? No      Cris Mcginnis CMA  January 31, 2020

## 2020-02-03 ENCOUNTER — ALLIED HEALTH/NURSE VISIT (OUTPATIENT)
Dept: RESEARCH | Facility: CLINIC | Age: 30
End: 2020-02-03

## 2020-02-03 ENCOUNTER — TELEPHONE (OUTPATIENT)
Dept: TRANSPLANT | Facility: CLINIC | Age: 30
End: 2020-02-03

## 2020-02-03 ENCOUNTER — TELEPHONE (OUTPATIENT)
Dept: PHARMACY | Facility: CLINIC | Age: 30
End: 2020-02-03

## 2020-02-03 ENCOUNTER — OFFICE VISIT (OUTPATIENT)
Dept: NEPHROLOGY | Facility: CLINIC | Age: 30
End: 2020-02-03
Attending: INTERNAL MEDICINE
Payer: MEDICARE

## 2020-02-03 VITALS
SYSTOLIC BLOOD PRESSURE: 124 MMHG | HEIGHT: 60 IN | OXYGEN SATURATION: 99 % | WEIGHT: 135.9 LBS | TEMPERATURE: 97.5 F | DIASTOLIC BLOOD PRESSURE: 77 MMHG | HEART RATE: 70 BPM | BODY MASS INDEX: 26.68 KG/M2

## 2020-02-03 DIAGNOSIS — N25.81 SECONDARY RENAL HYPERPARATHYROIDISM (H): ICD-10-CM

## 2020-02-03 DIAGNOSIS — E83.42 HYPOMAGNESEMIA: ICD-10-CM

## 2020-02-03 DIAGNOSIS — Z79.899 LONG TERM USE OF DRUG: ICD-10-CM

## 2020-02-03 DIAGNOSIS — D84.9 IMMUNOSUPPRESSION (H): ICD-10-CM

## 2020-02-03 DIAGNOSIS — D63.1 ANEMIA IN STAGE 3 CHRONIC KIDNEY DISEASE (H): ICD-10-CM

## 2020-02-03 DIAGNOSIS — Z94.0 KIDNEY REPLACED BY TRANSPLANT: Primary | ICD-10-CM

## 2020-02-03 DIAGNOSIS — Z00.6 EXAMINATION OF PARTICIPANT IN CLINICAL TRIAL: Primary | ICD-10-CM

## 2020-02-03 DIAGNOSIS — N18.30 ANEMIA IN STAGE 3 CHRONIC KIDNEY DISEASE (H): ICD-10-CM

## 2020-02-03 DIAGNOSIS — E55.9 VITAMIN D DEFICIENCY: ICD-10-CM

## 2020-02-03 DIAGNOSIS — Z94.0 KIDNEY REPLACED BY TRANSPLANT: ICD-10-CM

## 2020-02-03 DIAGNOSIS — Z48.298 AFTERCARE FOLLOWING ORGAN TRANSPLANT: Primary | ICD-10-CM

## 2020-02-03 LAB
ALBUMIN SERPL-MCNC: 3.7 G/DL (ref 3.4–5)
ALP SERPL-CCNC: 126 U/L (ref 40–150)
ALT SERPL W P-5'-P-CCNC: 20 U/L (ref 0–50)
ANION GAP SERPL CALCULATED.3IONS-SCNC: 8 MMOL/L (ref 3–14)
AST SERPL W P-5'-P-CCNC: 13 U/L (ref 0–45)
BILIRUB DIRECT SERPL-MCNC: 0.1 MG/DL (ref 0–0.2)
BILIRUB SERPL-MCNC: 0.6 MG/DL (ref 0.2–1.3)
BUN SERPL-MCNC: 21 MG/DL (ref 7–30)
CALCIUM SERPL-MCNC: 9.2 MG/DL (ref 8.5–10.1)
CHLORIDE SERPL-SCNC: 110 MMOL/L (ref 94–109)
CHOLEST SERPL-MCNC: 166 MG/DL
CO2 SERPL-SCNC: 22 MMOL/L (ref 20–32)
CREAT SERPL-MCNC: 1.07 MG/DL (ref 0.52–1.04)
CREAT UR-MCNC: 58 MG/DL
ERYTHROCYTE [DISTWIDTH] IN BLOOD BY AUTOMATED COUNT: 13 % (ref 10–15)
GFR SERPL CREATININE-BSD FRML MDRD: 71 ML/MIN/{1.73_M2}
GLUCOSE SERPL-MCNC: 107 MG/DL (ref 70–99)
HBA1C MFR BLD: 5.7 % (ref 0–5.6)
HCT VFR BLD AUTO: 31.7 % (ref 35–47)
HDLC SERPL-MCNC: 41 MG/DL
HGB BLD-MCNC: 9.6 G/DL (ref 11.7–15.7)
LDLC SERPL CALC-MCNC: 100 MG/DL
MCH RBC QN AUTO: 28.4 PG (ref 26.5–33)
MCHC RBC AUTO-ENTMCNC: 30.3 G/DL (ref 31.5–36.5)
MCV RBC AUTO: 94 FL (ref 78–100)
NONHDLC SERPL-MCNC: 125 MG/DL
PLATELET # BLD AUTO: 360 10E9/L (ref 150–450)
POTASSIUM SERPL-SCNC: 4.4 MMOL/L (ref 3.4–5.3)
PROT SERPL-MCNC: 7.4 G/DL (ref 6.8–8.8)
PROT UR-MCNC: 0.08 G/L
PROT/CREAT 24H UR: 0.13 G/G CR (ref 0–0.2)
RBC # BLD AUTO: 3.38 10E12/L (ref 3.8–5.2)
SODIUM SERPL-SCNC: 140 MMOL/L (ref 133–144)
TRIGL SERPL-MCNC: 125 MG/DL
URATE SERPL-MCNC: 6 MG/DL (ref 2.6–6)
WBC # BLD AUTO: 4.4 10E9/L (ref 4–11)

## 2020-02-03 PROCEDURE — 40001063 ZZHCL STATISTIC ALLOSURE: Performed by: INTERNAL MEDICINE

## 2020-02-03 PROCEDURE — 84550 ASSAY OF BLOOD/URIC ACID: CPT | Performed by: INTERNAL MEDICINE

## 2020-02-03 PROCEDURE — G0463 HOSPITAL OUTPT CLINIC VISIT: HCPCS | Mod: ZF

## 2020-02-03 PROCEDURE — 87799 DETECT AGENT NOS DNA QUANT: CPT | Performed by: INTERNAL MEDICINE

## 2020-02-03 PROCEDURE — 80076 HEPATIC FUNCTION PANEL: CPT | Performed by: INTERNAL MEDICINE

## 2020-02-03 PROCEDURE — 84156 ASSAY OF PROTEIN URINE: CPT | Performed by: INTERNAL MEDICINE

## 2020-02-03 PROCEDURE — 80048 BASIC METABOLIC PNL TOTAL CA: CPT | Performed by: INTERNAL MEDICINE

## 2020-02-03 PROCEDURE — 36415 COLL VENOUS BLD VENIPUNCTURE: CPT | Performed by: INTERNAL MEDICINE

## 2020-02-03 PROCEDURE — 80061 LIPID PANEL: CPT | Performed by: INTERNAL MEDICINE

## 2020-02-03 PROCEDURE — 83036 HEMOGLOBIN GLYCOSYLATED A1C: CPT | Performed by: INTERNAL MEDICINE

## 2020-02-03 PROCEDURE — 80197 ASSAY OF TACROLIMUS: CPT | Performed by: INTERNAL MEDICINE

## 2020-02-03 PROCEDURE — 85027 COMPLETE CBC AUTOMATED: CPT | Performed by: INTERNAL MEDICINE

## 2020-02-03 ASSESSMENT — MIFFLIN-ST. JEOR: SCORE: 1272.94

## 2020-02-03 ASSESSMENT — PAIN SCALES - GENERAL: PAINLEVEL: NO PAIN (0)

## 2020-02-03 NOTE — NURSING NOTE
Chief Complaint   Patient presents with     RECHECK     kidney tx     /77   Pulse 70   Temp 97.5  F (36.4  C) (Oral)   Ht 1.524 m (5')   Wt 61.6 kg (135 lb 14.4 oz)   SpO2 99%   BMI 26.54 kg/m    Eugenia Corrigan [facility]  2/3/2020 10:41 AM

## 2020-02-03 NOTE — PROGRESS NOTES
SurgCTO Documentation of Consent Approach    Evaluation of Patient Outcomes from the Kidney Allograft Outcomes Allosure Registry (KOAR)    Protocol #[id number]                                                 [facility]  Protocol #[id number]   ICF Version Date: 04 June 2018                                            IRB Approval Date:  04 June 2018    I spoke with the patient today regarding participation in the KOAR Allosure Registry and she said she would like to think about it. I gave her a consent form and told her we would contact her at her next visit.        PI:  Dr. Ranjith Delgadillo    : Briseyda Medina   [phone] [email or web address]

## 2020-02-03 NOTE — PROGRESS NOTES
ACUTE TRANSPLANT NEPHROLOGY VISIT    Assessment & Plan   # DDKT: Stable   - Baseline Cr ~ 1.1-1.3   - Proteinuria: Minimal (0.2-0.5 grams)   - Date DSA Last Checked: Dec/2019      Latest DSA: No   - BK Viremia: No   - Kidney Tx Biopsy: Sep 17, 2019; Result: No diagnostic evidence of acute rejection.    Recently inpatient for pyelonephritis (enterococcus faealis) and CAMERON, Cr has improved to 1.01mg/dL and she denies any urinary complaints. She is 6 months post-transplant and BK due for February. Last DSA was negative. Urology planned for her ureteral stent removal 1/31/20. She will compete her amoxicillin (3 week plan) on 2/13/20. BK level today.          # Immunosuppression: Tacrolimus immediate release (goal 4-6) and Mycophenolic acid (goal 1.0-3.5)   - Changes: No, last tacrolimus level 11.5 (dose 6mg BID). Will get level today (might be 14hr level today)    # Infection Prophylaxis:   - PJP: Inhaled pentamidine  - CMV: None, prophylaxis completed  - Thrush: None    Would consider getting T-cell subset panel today.    # Blood Pressure: Borderline control;  Goal BP: > 100, but < 130 systolic   - Volume status: Euvolemic   - Changes: Yes - was discharged with Florinef 2x weekly. We can continue her twice weekly dosing for a few more weeks.      # Anemia in Chronic Renal Disease: Hgb: Stable      CHARLINE: Yes   - Iron studies: Replete. Follows with the anemia clinic, next follow up 2/11    # Mineral Bone Disorder:   - Secondary renal hyperparathyroidism; PTH level: Moderately elevated (301-600 pg/ml) and will continue on cinacalcet  - Vitamin D; level: Low        On Supplement: No due to hypercalcemia  - Calcium; level: High normal      On Supplement: No  - Phosphorus; level: Low        On Supplement: No    # Electrolytes:   - Potassium; level: Normal        On Supplement: No  - Magnesium; level: Normal        On Supplement: Yes  - Bicarbonate; level: Normal        On Supplement: Yes    # Medical Compliance: Yes    #  Transplant History:  Etiology of Kidney Failure: IgA nephropathy  Tx: DDKT  Transplant: 8/3/2019 (Kidney)  Donor Type: Donation after Circulatory Death  Donor Class: Standard Criteria Donor  Crossmatch at time of Tx: negative  DSA at time of Tx: No  Significant changes in immunosuppression: None  CMV IgG Ab High Risk Discordance (D+/R-): No  EBV IgG Ab High Risk Discordance (D+/R-): No  Significant transplant-related complications: None    Transplant Office Phone Number: [phone]    Assessment and plan was discussed with the patient and she voiced her understanding and agreement.    Return visit: Return in about 3 months (around 5/3/2020).    Judith Linda MD     Attestation:  This patient has been seen and evaluated by me, Nakul Hill MD.  I have reviewed the note and agree with plan of care as documented by the fellow.       Chief Complaint   Ms. Wagner is a 29 year old here for routine follow up.     History of Present Illness   Ms Wagner is s/p DDKT 8/3/2019. She was lats seen I the hospital ~2 weeks ago for pyelonephritis. She was discharged on amoxicilin oral (3 week plan).    She denies fevers, chills, and night sweats. Had sweats last week (drenching and had to change her clothes). This has resolved. She denies chest pain of shortness of breath. She denies dysuria, and no changes to her urine output. She has gained 20lb since transplant.     Recent Hospitalizations:  [] No [x] Yes Pyelonephritis   New Medical Issues: [] No [x] Yes Hydronephrosis   Decreased energy: [x] No [] Yes    Chest pain or SOB with exertion:  [x] No [] Yes    Appetite change or weight change: [] No [x] Yes    Nausea, vomiting or diarrhea:  [] No [x] Yes Occasional loose stools (formed/soft)   Fever, sweats or chills: [x] No [] Yes    Leg swelling: [x] No [] Yes      Home BP: 120/80s    Review of Systems   A comprehensive review of systems was obtained and negative, except as noted in the HPI or PMH.    Problem List   Patient  Active Problem List   Diagnosis     DVT of upper extremity (deep vein thrombosis) (H)     Seizure disorder (H)     Other general symptoms(780.99)     Galactorrhea     Acquired hypothyroidism     Anemia in chronic kidney disease     Increased prolactin level (H)     Hypomagnesemia     Normocytic anemia     Thrombocytopenia (H)     Infective endocarditis     Aftercare following organ transplant     Immunosuppression (H)     Methemoglobinemia     Kidney replaced by transplant     Anxiety     Secondary renal hyperparathyroidism (H)     Vitamin D deficiency     CKD (chronic kidney disease) stage 3, GFR 30-59 ml/min (H)     Anemia of chronic renal failure, stage 3 (moderate) (H)     Stricture or kinking of ureter     Pyelonephritis     Diarrhea       Social History   Social History     Tobacco Use     Smoking status: Never Smoker     Smokeless tobacco: Never Used   Substance Use Topics     Alcohol use: No     Alcohol/week: 0.0 standard drinks     Drug use: No       Allergies   Allergies   Allergen Reactions     Contrast Dye Rash     CT contrast allergy 12/14/19 rash over eyes. Need to have pre medication before a CT WITH CONTRAST      Chlorhexidine Rash     Rash at site     Sulfa Drugs Rash     Muscle stiffness of neck     Dapsone      Methemoglobinemia     Furosemide Other (See Comments)     Skin flushing     Lisinopril Swelling     angioedema       Medications   Current Outpatient Medications   Medication Sig     acetaminophen (TYLENOL) 325 MG tablet Take 2 tablets (650 mg) by mouth every 4 hours as needed for mild pain     amoxicillin (AMOXIL) 500 MG capsule Take 1 capsule (500 mg) by mouth every 8 hours for 18 days     aspirin (ASA) 81 MG chewable tablet Take 1 tablet (81 mg) by mouth daily     atorvastatin (LIPITOR) 10 MG tablet Take 1 tablet (10 mg) by mouth daily     cinacalcet (SENSIPAR) 30 MG tablet Take 1 tablet (30 mg) by mouth daily     diphenhydrAMINE (BENADRYL) 25 MG tablet Take 1-2 tablets (25-50 mg) by  mouth every 6 hours as needed for itching or allergies     EPINEPHrine (EPIPEN/ADRENACLICK/OR ANY BX GENERIC EQUIV) 0.3 MG/0.3ML injection 2-pack 0.3 mg     fludrocortisone (FLORINEF) 0.1 MG tablet Take 0.1 mg by mouth twice a week On Tuesday and Thursday     hydrOXYzine (ATARAX) 10 MG tablet Take 1 tablet (10 mg) by mouth 3 times daily as needed for anxiety     levothyroxine (SYNTHROID/LEVOTHROID) 25 MCG tablet Take 1 tablet (25 mcg) Tuesday, Thursday, Saturday and Sunday and 1.5 tablets (37.5 mcg) on Monday, Wednesday and Friday every week.     loperamide (IMODIUM) 2 MG capsule Take 1 capsule (2 mg) by mouth 4 times daily as needed for diarrhea     magnesium oxide (MAG-OX) 400 MG tablet Take 1 tablet (400 mg) by mouth 2 times daily     menthol-zinc oxide (CALMOSEPTINE) 0.44-20.6 % OINT ointment Apply topically 4 times daily as needed for skin protection or irritation     MYFORTIC (BRAND) 180 MG EC tablet Take 3 tablets (540 mg) by mouth 2 times daily     norethindrone (MICRONOR) 0.35 MG tablet Do not restart until your follow up appointment with your surgeon. Discuss restart date at that appointment.     pentamidine (NEBUPENT) 300 MG neb solution Inhale 300 mg into the lungs every 28 days     psyllium (METAMUCIL/KONSYL) capsule Take 1 capsule by mouth daily (Patient taking differently: Take 1 capsule by mouth daily as needed (loose stools) )     simethicone (MYLICON) 125 MG chewable tablet Take 1 tablet (125 mg) by mouth 4 times daily as needed for intestinal gas     sodium bicarbonate 650 MG tablet TAKE 3 TABLETS(1950 MG) BY MOUTH TWICE DAILY     tacrolimus (GENERIC EQUIVALENT) 1 MG capsule Take 1 capsule (1 mg) by mouth 2 times daily Total dose = 6 mg BID     tacrolimus (GENERIC EQUIVALENT) 5 MG capsule Take 1 capsule (5 mg) by mouth 2 times daily Total dose = 6 mg BID     No current facility-administered medications for this visit.      There are no discontinued medications.    Physical Exam   Vital Signs: BP  124/77   Pulse 70   Temp 97.5  F (36.4  C) (Oral)   Ht 1.524 m (5')   Wt 61.6 kg (135 lb 14.4 oz)   SpO2 99%   BMI 26.54 kg/m      GENERAL APPEARANCE: alert and no distress  HENT: mouth without ulcers or lesions  LYMPHATICS: no cervical or supraclavicular nodes  RESP: lungs clear to auscultation - no rales, rhonchi or wheezes  CV: regular rhythm, normal rate, no rub, no murmur  EDEMA: no LE edema bilaterally  ABDOMEN: soft, nondistended, nontender, bowel sounds normal  MS: extremities normal - no gross deformities noted, no evidence of inflammation in joints, no muscle tenderness  SKIN: no rash  TX KIDNEY: normal  DIALYSIS ACCESS:  LUE AV Fistula with good thrill    Data     Renal Latest Ref Rng & Units 2/3/2020 1/30/2020 1/27/2020   Na 133 - 144 mmol/L 140 138 140   Na (external) 136 - 145 mmol/L - - -   K 3.4 - 5.3 mmol/L 4.4 4.3 3.8   K (external) 3.5 - 5.0 mmol/L - - -   Cl 94 - 109 mmol/L 110(H) 111(H) 112(H)   Cl (external) 98 - 107 mmol/L - - -   CO2 20 - 32 mmol/L 22 21 23   CO2 (external) 22 - 31 mmol/L - - -   BUN 7 - 30 mg/dL 21 18 9   BUN (external) 8 - 22 mg/dL - - -   Cr 0.52 - 1.04 mg/dL 1.07(H) 1.01 1.14(H)   Cr (external) 0.60 - 1 mg/dL - - -   Glucose 70 - 99 mg/dL 107(H) 96 92   Glucose (external) 70 - 125 mg/dL - - -   Ca  8.5 - 10.1 mg/dL 9.2 9.8 9.4   Ca (external) 8.5 - 10.5 mg/dL - - -   Mg 1.6 - 2.3 mg/dL - - -   Mg (external) 1.8 - 2.6 mg/dL - - -     Bone Health Latest Ref Rng & Units 1/26/2020 1/25/2020 1/24/2020   Phos 2.5 - 4.5 mg/dL 2.7 1.7(L) 2.0(L)   Phos (external) 2.5 - 4.5 mg/dL - - -   PTHi 18 - 80 pg/mL - - -   PTHi (external) 18 - 80 pg/mL - - -   Vit D Def 20 - 75 ug/L - - -     Heme Latest Ref Rng & Units 2/3/2020 1/30/2020 1/27/2020   WBC 4.0 - 11.0 10e9/L 4.4 3.5(L) 3.6(L)   WBC (external) 4.0 - 11.0 thou/uL - - -   Hgb 11.7 - 15.7 g/dL 9.6(L) 9.5(L) 8.2(L)   Hgb (external) 12.0 - 16.0 g/dL - - -   Plt 150 - 450 10e9/L 360 280 173   Plt (external) 140 - 440 thou/uL -  - -     Liver Latest Ref Rng & Units 2/3/2020 1/23/2020 8/30/2019   AP 40 - 150 U/L 126 210(H) 167(H)   AP (external) 34 - 104 U/L - - -   TBili 0.2 - 1.3 mg/dL 0.6 1.2 0.3   DBili 0.0 - 0.2 mg/dL 0.1 - -   ALT 0 - 50 U/L 20 20 16   ALT (external) 7 - 52 U/L - - -   AST 0 - 45 U/L 13 9 <3   AST (external) 13 - 39 U/L - - -   Tot Protein 6.8 - 8.8 g/dL 7.4 8.1 7.2   Tot Protein (external) 6.4 - 8.9 g/dL - - -   Albumin 3.4 - 5.0 g/dL 3.7 4.5 3.9   Albumin (external) - - - -     Pancreas Latest Ref Rng & Units 2/3/2020 8/3/2019 8/2/2019   A1C 0 - 5.6 % 5.7(H) 4.8 4.8     Iron studies Latest Ref Rng & Units 12/3/2019 9/3/2019 8/30/2019   Iron 35 - 180 ug/dL 47 87 138   Iron sat 15 - 46 % 23 34 51(H)   Ferritin 12 - 150 ng/mL 1,003(H) 1,535(H) 1,746(H)   Ferritin (external) 10 - 291 ng/mL - - -     UMP Txp Virology Latest Ref Rng & Units 1/23/2020 1/6/2020 12/3/2019   CVM DNA Quant - EDTA PLASMA - -   BK Spec - - Plasma, EDTA anticoagulant Plasma   BK Res BKNEG:BK Virus DNA Not Detected copies/mL - BK Virus DNA Not Detected BK Virus DNA Not Detected   BK Log <2.7 Log copies/mL - Not Calculated Not Calculated   Hep B Surf - - - -        Recent Labs   Lab Test 01/25/20  0656 01/27/20  0900 01/30/20  0900   DOSTAC Not Provided 01/26/20 2050 01/29/20 2020   TACROL 7.3 8.1 11.5     Recent Labs   Lab Test 08/16/19  0807 09/03/19  0944   DOSMPA Not Provided 0840pm   MPACID 1.92 3.39   MPAG 149.2* 52.8

## 2020-02-03 NOTE — TELEPHONE ENCOUNTER
Anemia Management Note  SUBJECTIVE/OBJECTIVE:  Referred by Dr. Nakul Hill on 2019  Primary Diagnosis: Anemia in Chronic Kidney Disease (N18.3, D63.1)     Secondary Diagnosis:  Chronic Kidney Disease, Stage 3 (N18.3)   Kidney Tx: 2019  Hgb goal range:  9-10  Epo/Darbo: Aranesp  40 mcg  every two weeks for Hgb <10.  In clinic.   Iron regimen:  NA  Labs : 2020  Recent CHARLINE use, transfusion, IV iron: PO Iron and Aranesp   RX/TX plans :2020  No history of stroke, MI and blood clots or cancers  Contact:            Ok to leave message  per consent to communicate dated 05/15/2019                              OK to speak with Jt Aleman () per consent to communicate dated 05/15/2013    Anemia Latest Ref Rng & Units 2020   CHARLINE Dose - - - - - - - -   Hemoglobin 11.7 - 15.7 g/dL 10.4(L) 10.4(L) 8.9(L) 8.1(L) 8.2(L) 8.2(L) 9.5(L)   TSAT 15 - 46 % - - - - - - -   Ferritin 12 - 150 ng/mL - - - - - - -     BP Readings from Last 3 Encounters:   20 121/78   20 111/71   19 125/72     Wt Readings from Last 2 Encounters:   20 63.3 kg (139 lb 9.6 oz)   19 62.6 kg (138 lb)       Mona did not schedule the Aranesp for 20 as previously discussed.  Hgb is improving on it's own. Will continue to monitor labs.     ASSESSMENT:  Hgb:at goal - continue to monitor  TSat: not at goal (>30%) but ferritin >1000ng/mL.  PO iron not indicated at this time per anemia protocol.   Ferritin: Elevated (>1000ng/mL)    PLAN:  RTC for Hgb in 2 week(s) and Check iron studies in 2 week(s)    Orders needed to be renewed (for next follow-up date) in EPIC: None    Iron labs due:  Due    Plan discussed with:  Spoke with Mona on 2020  Plan provided by:  solomon    NEXT FOLLOW-UP DATE:  2020    Manjula Mckinnon RN   Anemia Services  [address] [address]   [email or web address]    Office : [phone]  Fax: [phone]

## 2020-02-03 NOTE — LETTER
2/3/2020    RE: Mona Wagner  [address]  [place] [address]       ACUTE TRANSPLANT NEPHROLOGY VISIT    Assessment & Plan   # DDKT: Stable   - Baseline Cr ~ 1.1-1.3   - Proteinuria: Minimal (0.2-0.5 grams)   - Date DSA Last Checked: Dec/2019      Latest DSA: No   - BK Viremia: No   - Kidney Tx Biopsy: Sep 17, 2019; Result: No diagnostic evidence of acute rejection.    Recently inpatient for pyelonephritis (enterococcus faealis) and CAMERON, Cr has improved to 1.01mg/dL and she denies any urinary complaints. She is 6 months post-transplant and BK due for February. Last DSA was negative. Urology planned for her ureteral stent removal 1/31/20. She will compete her amoxicillin (3 week plan) on 2/13/20. BK level today.          # Immunosuppression: Tacrolimus immediate release (goal 4-6) and Mycophenolic acid (goal 1.0-3.5)   - Changes: No, last tacrolimus level 11.5 (dose 6mg BID). Will get level today (might be 14hr level today)    # Infection Prophylaxis:   - PJP: Inhaled pentamidine  - CMV: None, prophylaxis completed  - Thrush: None    Would consider getting T-cell subset panel today.    # Blood Pressure: Borderline control;  Goal BP: > 100, but < 130 systolic   - Volume status: Euvolemic   - Changes: Yes - was discharged with Florinef 2x weekly. We can continue her twice weekly dosing for a few more weeks.      # Anemia in Chronic Renal Disease: Hgb: Stable      CHARLINE: Yes   - Iron studies: Replete. Follows with the anemia clinic, next follow up 2/11    # Mineral Bone Disorder:   - Secondary renal hyperparathyroidism; PTH level: Moderately elevated (301-600 pg/ml) and will continue on cinacalcet  - Vitamin D; level: Low        On Supplement: No due to hypercalcemia  - Calcium; level: High normal      On Supplement: No  - Phosphorus; level: Low        On Supplement: No    # Electrolytes:   - Potassium; level: Normal        On Supplement: No  - Magnesium; level: Normal        On Supplement: Yes  - Bicarbonate;  level: Normal        On Supplement: Yes    # Medical Compliance: Yes    # Transplant History:  Etiology of Kidney Failure: IgA nephropathy  Tx: DDKT  Transplant: 8/3/2019 (Kidney)  Donor Type: Donation after Circulatory Death  Donor Class: Standard Criteria Donor  Crossmatch at time of Tx: negative  DSA at time of Tx: No  Significant changes in immunosuppression: None  CMV IgG Ab High Risk Discordance (D+/R-): No  EBV IgG Ab High Risk Discordance (D+/R-): No  Significant transplant-related complications: None    Transplant Office Phone Number: [phone]    Assessment and plan was discussed with the patient and she voiced her understanding and agreement.    Return visit: Return in about 3 months (around 5/3/2020).    Judith Linda MD     Attestation:  This patient has been seen and evaluated by me, Nakul Hill MD.  I have reviewed the note and agree with plan of care as documented by the fellow.       Chief Complaint   Ms. Wagner is a 29 year old here for routine follow up.     History of Present Illness   Ms Wagner is s/p DDKT 8/3/2019. She was lats seen I the hospital ~2 weeks ago for pyelonephritis. She was discharged on amoxicilin oral (3 week plan).    She denies fevers, chills, and night sweats. Had sweats last week (drenching and had to change her clothes). This has resolved. She denies chest pain of shortness of breath. She denies dysuria, and no changes to her urine output. She has gained 20lb since transplant.     Recent Hospitalizations:  [] No [x] Yes Pyelonephritis   New Medical Issues: [] No [x] Yes Hydronephrosis   Decreased energy: [x] No [] Yes    Chest pain or SOB with exertion:  [x] No [] Yes    Appetite change or weight change: [] No [x] Yes    Nausea, vomiting or diarrhea:  [] No [x] Yes Occasional loose stools (formed/soft)   Fever, sweats or chills: [x] No [] Yes    Leg swelling: [x] No [] Yes      Home BP: 120/80s    Review of Systems   A comprehensive review of systems was obtained and  negative, except as noted in the HPI or PMH.    Problem List   Patient Active Problem List   Diagnosis     DVT of upper extremity (deep vein thrombosis) (H)     Seizure disorder (H)     Other general symptoms(780.99)     Galactorrhea     Acquired hypothyroidism     Anemia in chronic kidney disease     Increased prolactin level (H)     Hypomagnesemia     Normocytic anemia     Thrombocytopenia (H)     Infective endocarditis     Aftercare following organ transplant     Immunosuppression (H)     Methemoglobinemia     Kidney replaced by transplant     Anxiety     Secondary renal hyperparathyroidism (H)     Vitamin D deficiency     CKD (chronic kidney disease) stage 3, GFR 30-59 ml/min (H)     Anemia of chronic renal failure, stage 3 (moderate) (H)     Stricture or kinking of ureter     Pyelonephritis     Diarrhea       Social History   Social History     Tobacco Use     Smoking status: Never Smoker     Smokeless tobacco: Never Used   Substance Use Topics     Alcohol use: No     Alcohol/week: 0.0 standard drinks     Drug use: No       Allergies   Allergies   Allergen Reactions     Contrast Dye Rash     CT contrast allergy 12/14/19 rash over eyes. Need to have pre medication before a CT WITH CONTRAST      Chlorhexidine Rash     Rash at site     Sulfa Drugs Rash     Muscle stiffness of neck     Dapsone      Methemoglobinemia     Furosemide Other (See Comments)     Skin flushing     Lisinopril Swelling     angioedema       Medications   Current Outpatient Medications   Medication Sig     acetaminophen (TYLENOL) 325 MG tablet Take 2 tablets (650 mg) by mouth every 4 hours as needed for mild pain     amoxicillin (AMOXIL) 500 MG capsule Take 1 capsule (500 mg) by mouth every 8 hours for 18 days     aspirin (ASA) 81 MG chewable tablet Take 1 tablet (81 mg) by mouth daily     atorvastatin (LIPITOR) 10 MG tablet Take 1 tablet (10 mg) by mouth daily     cinacalcet (SENSIPAR) 30 MG tablet Take 1 tablet (30 mg) by mouth daily      diphenhydrAMINE (BENADRYL) 25 MG tablet Take 1-2 tablets (25-50 mg) by mouth every 6 hours as needed for itching or allergies     EPINEPHrine (EPIPEN/ADRENACLICK/OR ANY BX GENERIC EQUIV) 0.3 MG/0.3ML injection 2-pack 0.3 mg     fludrocortisone (FLORINEF) 0.1 MG tablet Take 0.1 mg by mouth twice a week On Tuesday and Thursday     hydrOXYzine (ATARAX) 10 MG tablet Take 1 tablet (10 mg) by mouth 3 times daily as needed for anxiety     levothyroxine (SYNTHROID/LEVOTHROID) 25 MCG tablet Take 1 tablet (25 mcg) Tuesday, Thursday, Saturday and Sunday and 1.5 tablets (37.5 mcg) on Monday, Wednesday and Friday every week.     loperamide (IMODIUM) 2 MG capsule Take 1 capsule (2 mg) by mouth 4 times daily as needed for diarrhea     magnesium oxide (MAG-OX) 400 MG tablet Take 1 tablet (400 mg) by mouth 2 times daily     menthol-zinc oxide (CALMOSEPTINE) 0.44-20.6 % OINT ointment Apply topically 4 times daily as needed for skin protection or irritation     MYFORTIC (BRAND) 180 MG EC tablet Take 3 tablets (540 mg) by mouth 2 times daily     norethindrone (MICRONOR) 0.35 MG tablet Do not restart until your follow up appointment with your surgeon. Discuss restart date at that appointment.     pentamidine (NEBUPENT) 300 MG neb solution Inhale 300 mg into the lungs every 28 days     psyllium (METAMUCIL/KONSYL) capsule Take 1 capsule by mouth daily (Patient taking differently: Take 1 capsule by mouth daily as needed (loose stools) )     simethicone (MYLICON) 125 MG chewable tablet Take 1 tablet (125 mg) by mouth 4 times daily as needed for intestinal gas     sodium bicarbonate 650 MG tablet TAKE 3 TABLETS(1950 MG) BY MOUTH TWICE DAILY     tacrolimus (GENERIC EQUIVALENT) 1 MG capsule Take 1 capsule (1 mg) by mouth 2 times daily Total dose = 6 mg BID     tacrolimus (GENERIC EQUIVALENT) 5 MG capsule Take 1 capsule (5 mg) by mouth 2 times daily Total dose = 6 mg BID     No current facility-administered medications for this visit.       There are no discontinued medications.    Physical Exam   Vital Signs: /77   Pulse 70   Temp 97.5  F (36.4  C) (Oral)   Ht 1.524 m (5')   Wt 61.6 kg (135 lb 14.4 oz)   SpO2 99%   BMI 26.54 kg/m       GENERAL APPEARANCE: alert and no distress  HENT: mouth without ulcers or lesions  LYMPHATICS: no cervical or supraclavicular nodes  RESP: lungs clear to auscultation - no rales, rhonchi or wheezes  CV: regular rhythm, normal rate, no rub, no murmur  EDEMA: no LE edema bilaterally  ABDOMEN: soft, nondistended, nontender, bowel sounds normal  MS: extremities normal - no gross deformities noted, no evidence of inflammation in joints, no muscle tenderness  SKIN: no rash  TX KIDNEY: normal  DIALYSIS ACCESS:  LUE AV Fistula with good thrill    Data     Renal Latest Ref Rng & Units 2/3/2020 1/30/2020 1/27/2020   Na 133 - 144 mmol/L 140 138 140   Na (external) 136 - 145 mmol/L - - -   K 3.4 - 5.3 mmol/L 4.4 4.3 3.8   K (external) 3.5 - 5.0 mmol/L - - -   Cl 94 - 109 mmol/L 110(H) 111(H) 112(H)   Cl (external) 98 - 107 mmol/L - - -   CO2 20 - 32 mmol/L 22 21 23   CO2 (external) 22 - 31 mmol/L - - -   BUN 7 - 30 mg/dL 21 18 9   BUN (external) 8 - 22 mg/dL - - -   Cr 0.52 - 1.04 mg/dL 1.07(H) 1.01 1.14(H)   Cr (external) 0.60 - 1 mg/dL - - -   Glucose 70 - 99 mg/dL 107(H) 96 92   Glucose (external) 70 - 125 mg/dL - - -   Ca  8.5 - 10.1 mg/dL 9.2 9.8 9.4   Ca (external) 8.5 - 10.5 mg/dL - - -   Mg 1.6 - 2.3 mg/dL - - -   Mg (external) 1.8 - 2.6 mg/dL - - -     Bone Health Latest Ref Rng & Units 1/26/2020 1/25/2020 1/24/2020   Phos 2.5 - 4.5 mg/dL 2.7 1.7(L) 2.0(L)   Phos (external) 2.5 - 4.5 mg/dL - - -   PTHi 18 - 80 pg/mL - - -   PTHi (external) 18 - 80 pg/mL - - -   Vit D Def 20 - 75 ug/L - - -     Heme Latest Ref Rng & Units 2/3/2020 1/30/2020 1/27/2020   WBC 4.0 - 11.0 10e9/L 4.4 3.5(L) 3.6(L)   WBC (external) 4.0 - 11.0 thou/uL - - -   Hgb 11.7 - 15.7 g/dL 9.6(L) 9.5(L) 8.2(L)   Hgb (external) 12.0 - 16.0 g/dL  - - -   Plt 150 - 450 10e9/L 360 280 173   Plt (external) 140 - 440 thou/uL - - -     Liver Latest Ref Rng & Units 2/3/2020 1/23/2020 8/30/2019   AP 40 - 150 U/L 126 210(H) 167(H)   AP (external) 34 - 104 U/L - - -   TBili 0.2 - 1.3 mg/dL 0.6 1.2 0.3   DBili 0.0 - 0.2 mg/dL 0.1 - -   ALT 0 - 50 U/L 20 20 16   ALT (external) 7 - 52 U/L - - -   AST 0 - 45 U/L 13 9 <3   AST (external) 13 - 39 U/L - - -   Tot Protein 6.8 - 8.8 g/dL 7.4 8.1 7.2   Tot Protein (external) 6.4 - 8.9 g/dL - - -   Albumin 3.4 - 5.0 g/dL 3.7 4.5 3.9   Albumin (external) - - - -     Pancreas Latest Ref Rng & Units 2/3/2020 8/3/2019 8/2/2019   A1C 0 - 5.6 % 5.7(H) 4.8 4.8     Iron studies Latest Ref Rng & Units 12/3/2019 9/3/2019 8/30/2019   Iron 35 - 180 ug/dL 47 87 138   Iron sat 15 - 46 % 23 34 51(H)   Ferritin 12 - 150 ng/mL 1,003(H) 1,535(H) 1,746(H)   Ferritin (external) 10 - 291 ng/mL - - -     [facility] Txp Virology Latest Ref Rng & Units 1/23/2020 1/6/2020 12/3/2019   CVM DNA Quant - EDTA PLASMA - -   BK Spec - - Plasma, EDTA anticoagulant Plasma   BK Res BKNEG:BK Virus DNA Not Detected copies/mL - BK Virus DNA Not Detected BK Virus DNA Not Detected   BK Log <2.7 Log copies/mL - Not Calculated Not Calculated   Hep B Surf - - - -        Recent Labs   Lab Test 01/25/20  0656 01/27/20  0900 01/30/20  0900   DOSTAC Not Provided 01/26/20 2050 01/29/20 2020   TACROL 7.3 8.1 11.5     Recent Labs   Lab Test 08/16/19  0807 09/03/19  0944   DOSMPA Not Provided 0840pm   MPACID 1.92 3.39   MPAG 149.2* 52.8     Nakul Hill MD

## 2020-02-04 LAB
TACROLIMUS BLD-MCNC: 7.9 UG/L (ref 5–15)
TME LAST DOSE: NORMAL H

## 2020-02-05 LAB
SPECIMEN SOURCE: NORMAL
VIRUS SPEC CULT: NORMAL
VIRUS SPEC CULT: NORMAL

## 2020-02-06 DIAGNOSIS — Z94.0 KIDNEY REPLACED BY TRANSPLANT: Primary | ICD-10-CM

## 2020-02-06 DIAGNOSIS — Z94.0 KIDNEY REPLACED BY TRANSPLANT: ICD-10-CM

## 2020-02-06 DIAGNOSIS — Z79.899 LONG TERM USE OF DRUG: ICD-10-CM

## 2020-02-06 DIAGNOSIS — R14.0 BLOATED ABDOMEN: ICD-10-CM

## 2020-02-06 DIAGNOSIS — Z48.298 AFTERCARE FOLLOWING ORGAN TRANSPLANT: ICD-10-CM

## 2020-02-06 DIAGNOSIS — E87.20 METABOLIC ACIDOSIS: ICD-10-CM

## 2020-02-06 DIAGNOSIS — Z87.448 HISTORY OF PRIMARY IGA NEPHROPATHY: ICD-10-CM

## 2020-02-06 LAB
ALLOSURE DD-CFDNA: 0.33 %
ANION GAP SERPL CALCULATED.3IONS-SCNC: 6 MMOL/L (ref 3–14)
BKV DNA # SPEC NAA+PROBE: NORMAL COPIES/ML
BKV DNA SPEC NAA+PROBE-LOG#: NORMAL LOG COPIES/ML
BUN SERPL-MCNC: 20 MG/DL (ref 7–30)
CALCIUM SERPL-MCNC: 9.9 MG/DL (ref 8.5–10.1)
CD3 CELLS # BLD: 371 CELLS/UL (ref 603–2990)
CD3 CELLS NFR BLD: 52 % (ref 49–84)
CD3+CD4+ CELLS # BLD: 149 CELLS/UL (ref 441–2156)
CD3+CD4+ CELLS NFR BLD: 21 % (ref 28–63)
CD3+CD4+ CELLS/CD3+CD8+ CLL BLD: 0.78 % (ref 1.4–2.6)
CD3+CD8+ CELLS # BLD: 194 CELLS/UL (ref 125–1312)
CD3+CD8+ CELLS NFR BLD: 27 % (ref 10–40)
CHLORIDE SERPL-SCNC: 110 MMOL/L (ref 94–109)
CO2 SERPL-SCNC: 21 MMOL/L (ref 20–32)
CREAT SERPL-MCNC: 1.08 MG/DL (ref 0.52–1.04)
ERYTHROCYTE [DISTWIDTH] IN BLOOD BY AUTOMATED COUNT: 13.2 % (ref 10–15)
GFR SERPL CREATININE-BSD FRML MDRD: 70 ML/MIN/{1.73_M2}
GLUCOSE SERPL-MCNC: 84 MG/DL (ref 70–99)
HCT VFR BLD AUTO: 33.6 % (ref 35–47)
HGB BLD-MCNC: 10.3 G/DL (ref 11.7–15.7)
IFC SPECIMEN: ABNORMAL
Lab: NORMAL
MAGNESIUM SERPL-MCNC: 1.6 MG/DL (ref 1.6–2.3)
MCH RBC QN AUTO: 28.4 PG (ref 26.5–33)
MCHC RBC AUTO-ENTMCNC: 30.7 G/DL (ref 31.5–36.5)
MCV RBC AUTO: 93 FL (ref 78–100)
PHOSPHATE SERPL-MCNC: 2.7 MG/DL (ref 2.5–4.5)
PLATELET # BLD AUTO: 356 10E9/L (ref 150–450)
POTASSIUM SERPL-SCNC: 4.9 MMOL/L (ref 3.4–5.3)
RBC # BLD AUTO: 3.63 10E12/L (ref 3.8–5.2)
SODIUM SERPL-SCNC: 137 MMOL/L (ref 133–144)
SPECIMEN SOURCE: NORMAL
TACROLIMUS BLD-MCNC: 8.5 UG/L (ref 5–15)
TME LAST DOSE: NORMAL H
WBC # BLD AUTO: 3.9 10E9/L (ref 4–11)
YEAST SPEC QL CULT: NO GROWTH
YEAST SPEC QL CULT: NO GROWTH

## 2020-02-06 PROCEDURE — 84100 ASSAY OF PHOSPHORUS: CPT | Performed by: STUDENT IN AN ORGANIZED HEALTH CARE EDUCATION/TRAINING PROGRAM

## 2020-02-06 PROCEDURE — 86359 T CELLS TOTAL COUNT: CPT | Performed by: STUDENT IN AN ORGANIZED HEALTH CARE EDUCATION/TRAINING PROGRAM

## 2020-02-06 PROCEDURE — 85027 COMPLETE CBC AUTOMATED: CPT | Performed by: STUDENT IN AN ORGANIZED HEALTH CARE EDUCATION/TRAINING PROGRAM

## 2020-02-06 PROCEDURE — 80048 BASIC METABOLIC PNL TOTAL CA: CPT | Performed by: STUDENT IN AN ORGANIZED HEALTH CARE EDUCATION/TRAINING PROGRAM

## 2020-02-06 PROCEDURE — 86360 T CELL ABSOLUTE COUNT/RATIO: CPT | Performed by: STUDENT IN AN ORGANIZED HEALTH CARE EDUCATION/TRAINING PROGRAM

## 2020-02-06 PROCEDURE — 80197 ASSAY OF TACROLIMUS: CPT | Performed by: INTERNAL MEDICINE

## 2020-02-06 PROCEDURE — 83735 ASSAY OF MAGNESIUM: CPT | Performed by: STUDENT IN AN ORGANIZED HEALTH CARE EDUCATION/TRAINING PROGRAM

## 2020-02-06 RX ORDER — ASPIRIN 81 MG/1
TABLET, CHEWABLE ORAL
Qty: 36 TABLET | Refills: 10 | Status: SHIPPED | OUTPATIENT
Start: 2020-02-06 | End: 2021-02-11

## 2020-02-06 RX ORDER — SODIUM BICARBONATE 650 MG/1
TABLET ORAL
Qty: 180 TABLET | Refills: 11 | Status: SHIPPED | OUTPATIENT
Start: 2020-02-06 | End: 2021-02-11

## 2020-02-06 NOTE — TELEPHONE ENCOUNTER
Last Office Visit with Nephrologist:  2/3/20.  Medication refilled per Nephrology Clinic protocol.     Susana Prater RN

## 2020-02-07 ENCOUNTER — TELEPHONE (OUTPATIENT)
Dept: TRANSPLANT | Facility: CLINIC | Age: 30
End: 2020-02-07

## 2020-02-07 DIAGNOSIS — Z94.0 KIDNEY REPLACED BY TRANSPLANT: ICD-10-CM

## 2020-02-07 RX ORDER — SIMETHICONE 125 MG
125 TABLET,CHEWABLE ORAL 4 TIMES DAILY PRN
Qty: 120 TABLET | Refills: 4 | Status: SHIPPED | OUTPATIENT
Start: 2020-02-07 | End: 2021-03-29

## 2020-02-07 RX ORDER — TACROLIMUS 5 MG/1
5 CAPSULE ORAL 2 TIMES DAILY
Qty: 60 CAPSULE | Refills: 11 | Status: SHIPPED | OUTPATIENT
Start: 2020-02-07 | End: 2020-02-11

## 2020-02-07 RX ORDER — TACROLIMUS 1 MG/1
CAPSULE ORAL
Qty: 60 CAPSULE | Refills: 11 | Status: SHIPPED | OUTPATIENT
Start: 2020-02-07 | End: 2020-02-11

## 2020-02-07 NOTE — TELEPHONE ENCOUNTER
ISSUE:   Tacrolimus IR level 8.5 on 2/6, goal 6-8, dose 6 mg BID.    CD4 remains <200    PLAN:   Please call patient and confirm this was an accurate 12-hour trough. Verify Tacrolimus IR dose 6 mg BID. Confirm no new medications or illness. Confirm no missed doses. If accurate trough and accurate dose, decrease Tacrolimus IR dose to 5 mg BID and repeat labs as scheduled.    Continue pentamidine inhalation.     OUTCOME:   Spoke with patient, they confirm accurate trough level and current dose 6 mg BID. Patient confirmed dose change to 5 mg BID and to repeat labs Monday. Orders sent to preferred pharmacy for dose change. Patient voiced understanding of plan.

## 2020-02-10 DIAGNOSIS — Z94.0 KIDNEY REPLACED BY TRANSPLANT: Primary | ICD-10-CM

## 2020-02-10 DIAGNOSIS — Z79.899 LONG TERM USE OF DRUG: ICD-10-CM

## 2020-02-10 DIAGNOSIS — Z94.0 KIDNEY REPLACED BY TRANSPLANT: ICD-10-CM

## 2020-02-10 LAB
ANION GAP SERPL CALCULATED.3IONS-SCNC: 6 MMOL/L (ref 3–14)
BUN SERPL-MCNC: 30 MG/DL (ref 7–30)
CALCIUM SERPL-MCNC: 9.4 MG/DL (ref 8.5–10.1)
CHLORIDE SERPL-SCNC: 111 MMOL/L (ref 94–109)
CO2 SERPL-SCNC: 21 MMOL/L (ref 20–32)
CREAT SERPL-MCNC: 1.08 MG/DL (ref 0.52–1.04)
ERYTHROCYTE [DISTWIDTH] IN BLOOD BY AUTOMATED COUNT: 13.4 % (ref 10–15)
GFR SERPL CREATININE-BSD FRML MDRD: 70 ML/MIN/{1.73_M2}
GLUCOSE SERPL-MCNC: 98 MG/DL (ref 70–99)
HCT VFR BLD AUTO: 32.9 % (ref 35–47)
HGB BLD-MCNC: 10.1 G/DL (ref 11.7–15.7)
MCH RBC QN AUTO: 28.9 PG (ref 26.5–33)
MCHC RBC AUTO-ENTMCNC: 30.7 G/DL (ref 31.5–36.5)
MCV RBC AUTO: 94 FL (ref 78–100)
PLATELET # BLD AUTO: 242 10E9/L (ref 150–450)
POTASSIUM SERPL-SCNC: 4.7 MMOL/L (ref 3.4–5.3)
RBC # BLD AUTO: 3.49 10E12/L (ref 3.8–5.2)
SODIUM SERPL-SCNC: 138 MMOL/L (ref 133–144)
TACROLIMUS BLD-MCNC: 9.8 UG/L (ref 5–15)
TME LAST DOSE: NORMAL H
WBC # BLD AUTO: 3.7 10E9/L (ref 4–11)

## 2020-02-10 PROCEDURE — 85027 COMPLETE CBC AUTOMATED: CPT | Performed by: INTERNAL MEDICINE

## 2020-02-10 PROCEDURE — 80197 ASSAY OF TACROLIMUS: CPT | Performed by: INTERNAL MEDICINE

## 2020-02-10 PROCEDURE — 80048 BASIC METABOLIC PNL TOTAL CA: CPT | Performed by: INTERNAL MEDICINE

## 2020-02-11 ENCOUNTER — TELEPHONE (OUTPATIENT)
Dept: PHARMACY | Facility: CLINIC | Age: 30
End: 2020-02-11

## 2020-02-11 ENCOUNTER — TELEPHONE (OUTPATIENT)
Dept: TRANSPLANT | Facility: CLINIC | Age: 30
End: 2020-02-11

## 2020-02-11 DIAGNOSIS — Z94.0 KIDNEY REPLACED BY TRANSPLANT: ICD-10-CM

## 2020-02-11 RX ORDER — TACROLIMUS 5 MG/1
CAPSULE ORAL
Qty: 60 CAPSULE | Refills: 11 | Status: SHIPPED | OUTPATIENT
Start: 2020-02-11 | End: 2020-04-16

## 2020-02-11 RX ORDER — TACROLIMUS 1 MG/1
4 CAPSULE ORAL 2 TIMES DAILY
Qty: 240 CAPSULE | Refills: 11 | Status: SHIPPED | OUTPATIENT
Start: 2020-02-11 | End: 2020-03-03

## 2020-02-11 NOTE — TELEPHONE ENCOUNTER
ISSUE:   Tacrolimus IR level 9.8 on 2/10, goal 6-8, dose 5 mg BID.    PLAN:   Please call patient and confirm this was an accurate 12-hour trough. Verify Tacrolimus IR dose 5 mg BID. Confirm no new medications or illness. Confirm no missed doses. If accurate trough and accurate dose, decrease Tacrolimus IR dose to 4 mg BID and repeat labs as scheduled.    Please notify RNCC if any recent illness (diarrhea).    Laurence Vazquez RN    OUTCOME:   Spoke with patient, they confirm accurate trough level and current dose 5 mg BID. Patient confirmed dose change to 4 mg BID and to repeat labs as scheduled. Orders sent to preferred pharmacy for dose change and lab for repeat labs. Patient voiced understanding of plan.   *Mona states she has been having more frequent stools since starting Amoxicillin in the end of January. She did start a probiotic and she states that has helped, stools are formed, but soft and 5-6 times/day. She will finish her prescription in 2 days.  Mona advised to continue her probiotic, and to notify her coordinator if the frequent stools continue after finishing the antibiotic.

## 2020-02-11 NOTE — TELEPHONE ENCOUNTER
Anemia Management Note  SUBJECTIVE/OBJECTIVE:  Referred by Dr. Nakul Hill on 2019  Primary Diagnosis: Anemia in Chronic Kidney Disease (N18.3, D63.1)     Secondary Diagnosis:  Chronic Kidney Disease, Stage 3 (N18.3)   Kidney Tx: 2019  Hgb goal range:  9-10  Epo/Darbo: Aranesp  40 mcg  every two weeks for Hgb <10.  In clinic.   Iron regimen:  NA  Labs : 2020  Recent CHARLINE use, transfusion, IV iron: PO Iron and Aranesp   RX/TX plans :2020  No history of stroke, MI and blood clots or cancers  Contact:            Ok to leave message  per consent to communicate dated 05/15/2019                              OK to speak with Jt Aleman () per consent to communicate dated 05/15/2013    Anemia Latest Ref Rng & Units 2020 2020 2020 2020 2/3/2020 2020 2/10/2020   CHARLINE Dose - - - - - - - -   Hemoglobin 11.7 - 15.7 g/dL 8.1(L) 8.2(L) 8.2(L) 9.5(L) 9.6(L) 10.3(L) 10.1(L)   TSAT 15 - 46 % - - - - - - -   Ferritin 12 - 150 ng/mL - - - - - - -     BP Readings from Last 3 Encounters:   20 124/77   20 121/78   20 111/71     Wt Readings from Last 2 Encounters:   20 61.6 kg (135 lb 14.4 oz)   20 63.3 kg (139 lb 9.6 oz)           ASSESSMENT:  Hgb:Above goal - recommend hold dose  TSat: not at goal (>30%) but ferritin >1000ng/mL.  PO iron not indicated at this time per anemia protocol.   Ferritin: Elevated (>1000ng/mL)    PLAN:  Hold Aranesp and RTC for hgb then aranesp if needed in 2 week(s)    Orders needed to be renewed (for next follow-up date) in EPIC: None    Iron labs due:  Due    Plan discussed with:  RUTHY Dunn  Plan provided by:  solomon    NEXT FOLLOW-UP DATE:  2020    Manjula Mckinnon RN   Anemia Services  [facility]  [address] [place] [id number]   [email or web address]   Office : [phone]  Fax: [phone]

## 2020-02-13 ENCOUNTER — TELEPHONE (OUTPATIENT)
Dept: TRANSPLANT | Facility: CLINIC | Age: 30
End: 2020-02-13

## 2020-02-13 DIAGNOSIS — Z94.0 KIDNEY REPLACED BY TRANSPLANT: Primary | ICD-10-CM

## 2020-02-13 DIAGNOSIS — Z94.0 KIDNEY REPLACED BY TRANSPLANT: ICD-10-CM

## 2020-02-13 DIAGNOSIS — Z79.899 LONG TERM USE OF DRUG: ICD-10-CM

## 2020-02-13 DIAGNOSIS — Z94.0 KIDNEY TRANSPLANTED: Primary | ICD-10-CM

## 2020-02-13 LAB
ANION GAP SERPL CALCULATED.3IONS-SCNC: 6 MMOL/L (ref 3–14)
BUN SERPL-MCNC: 34 MG/DL (ref 7–30)
CALCIUM SERPL-MCNC: 9.7 MG/DL (ref 8.5–10.1)
CHLORIDE SERPL-SCNC: 111 MMOL/L (ref 94–109)
CO2 SERPL-SCNC: 21 MMOL/L (ref 20–32)
CREAT SERPL-MCNC: 1.21 MG/DL (ref 0.52–1.04)
ERYTHROCYTE [DISTWIDTH] IN BLOOD BY AUTOMATED COUNT: 13.2 % (ref 10–15)
GFR SERPL CREATININE-BSD FRML MDRD: 61 ML/MIN/{1.73_M2}
GLUCOSE SERPL-MCNC: 94 MG/DL (ref 70–99)
HCT VFR BLD AUTO: 35 % (ref 35–47)
HGB BLD-MCNC: 10.6 G/DL (ref 11.7–15.7)
MCH RBC QN AUTO: 28.6 PG (ref 26.5–33)
MCHC RBC AUTO-ENTMCNC: 30.3 G/DL (ref 31.5–36.5)
MCV RBC AUTO: 95 FL (ref 78–100)
PLATELET # BLD AUTO: 228 10E9/L (ref 150–450)
POTASSIUM SERPL-SCNC: 5.1 MMOL/L (ref 3.4–5.3)
RBC # BLD AUTO: 3.7 10E12/L (ref 3.8–5.2)
SODIUM SERPL-SCNC: 138 MMOL/L (ref 133–144)
TACROLIMUS BLD-MCNC: 7.4 UG/L (ref 5–15)
TME LAST DOSE: NORMAL H
WBC # BLD AUTO: 4.2 10E9/L (ref 4–11)

## 2020-02-13 PROCEDURE — 80048 BASIC METABOLIC PNL TOTAL CA: CPT | Performed by: INTERNAL MEDICINE

## 2020-02-13 PROCEDURE — 85027 COMPLETE CBC AUTOMATED: CPT | Performed by: INTERNAL MEDICINE

## 2020-02-13 PROCEDURE — 80197 ASSAY OF TACROLIMUS: CPT | Performed by: INTERNAL MEDICINE

## 2020-02-13 NOTE — PROGRESS NOTES
Standing orders drawn for Dr. Hubert Johnson and sent to [facility].     Edison Sales, SHAYY Hogan ([facility]) Mónica LUCAS   [facility] [address] [place] [address]  [phone]

## 2020-02-13 NOTE — TELEPHONE ENCOUNTER
ISSUE:  Rise in creatinine from 1 to 1.2.    OUTCOME:  Mona reports she felt dehydrated prior to labs on Thursday.  She denies any N/V/D.  Denies any fever.  UO was somewhat concentrated yesterday.  Weight has been stable.  BP is running 116 / 72.  She denies feeling lightheaded/ dizzy upon standing.    Mona voiced understanding to hydrate well and repeat labs with UA as previously ordered / discussed.    Will monitor closely with recent history of pyelonephritis and ureteral stent removal.

## 2020-02-16 ENCOUNTER — HEALTH MAINTENANCE LETTER (OUTPATIENT)
Age: 30
End: 2020-02-16

## 2020-02-17 ENCOUNTER — TELEPHONE (OUTPATIENT)
Dept: TRANSPLANT | Facility: CLINIC | Age: 30
End: 2020-02-17

## 2020-02-17 DIAGNOSIS — Z79.899 LONG TERM USE OF DRUG: ICD-10-CM

## 2020-02-17 DIAGNOSIS — Z87.448 HISTORY OF PRIMARY IGA NEPHROPATHY: ICD-10-CM

## 2020-02-17 DIAGNOSIS — Z94.0 KIDNEY REPLACED BY TRANSPLANT: Primary | ICD-10-CM

## 2020-02-17 DIAGNOSIS — D63.1 ANEMIA OF CHRONIC RENAL FAILURE, STAGE 3 (MODERATE) (H): ICD-10-CM

## 2020-02-17 DIAGNOSIS — N18.30 CKD (CHRONIC KIDNEY DISEASE) STAGE 3, GFR 30-59 ML/MIN (H): ICD-10-CM

## 2020-02-17 DIAGNOSIS — N18.30 ANEMIA OF CHRONIC RENAL FAILURE, STAGE 3 (MODERATE) (H): ICD-10-CM

## 2020-02-17 DIAGNOSIS — Z94.0 KIDNEY REPLACED BY TRANSPLANT: ICD-10-CM

## 2020-02-17 DIAGNOSIS — Z94.0 KIDNEY TRANSPLANTED: ICD-10-CM

## 2020-02-17 LAB
ALBUMIN UR-MCNC: NEGATIVE MG/DL
ANION GAP SERPL CALCULATED.3IONS-SCNC: 2 MMOL/L (ref 3–14)
APPEARANCE UR: CLEAR
BILIRUB UR QL STRIP: NEGATIVE
BUN SERPL-MCNC: 29 MG/DL (ref 7–30)
CALCIUM SERPL-MCNC: 9.6 MG/DL (ref 8.5–10.1)
CHLORIDE SERPL-SCNC: 110 MMOL/L (ref 94–109)
CO2 SERPL-SCNC: 26 MMOL/L (ref 20–32)
COLOR UR AUTO: ABNORMAL
CREAT SERPL-MCNC: 1.1 MG/DL (ref 0.52–1.04)
ERYTHROCYTE [DISTWIDTH] IN BLOOD BY AUTOMATED COUNT: 13.2 % (ref 10–15)
FERRITIN SERPL-MCNC: 1139 NG/ML (ref 12–150)
GFR SERPL CREATININE-BSD FRML MDRD: 69 ML/MIN/{1.73_M2}
GLUCOSE SERPL-MCNC: 84 MG/DL (ref 70–99)
GLUCOSE UR STRIP-MCNC: NEGATIVE MG/DL
HCT VFR BLD AUTO: 35.4 % (ref 35–47)
HGB BLD-MCNC: 10.8 G/DL (ref 11.7–15.7)
HGB UR QL STRIP: ABNORMAL
IRON SATN MFR SERPL: 42 % (ref 15–46)
IRON SERPL-MCNC: 95 UG/DL (ref 35–180)
KETONES UR STRIP-MCNC: NEGATIVE MG/DL
LEUKOCYTE ESTERASE UR QL STRIP: NEGATIVE
MCH RBC QN AUTO: 29.1 PG (ref 26.5–33)
MCHC RBC AUTO-ENTMCNC: 30.5 G/DL (ref 31.5–36.5)
MCV RBC AUTO: 95 FL (ref 78–100)
NITRATE UR QL: NEGATIVE
PH UR STRIP: 6.5 PH (ref 5–7)
PLATELET # BLD AUTO: 196 10E9/L (ref 150–450)
POTASSIUM SERPL-SCNC: 4.8 MMOL/L (ref 3.4–5.3)
RBC # BLD AUTO: 3.71 10E12/L (ref 3.8–5.2)
RBC #/AREA URNS AUTO: 3 /HPF (ref 0–2)
SODIUM SERPL-SCNC: 138 MMOL/L (ref 133–144)
SOURCE: ABNORMAL
SP GR UR STRIP: 1.01 (ref 1–1.03)
SQUAMOUS #/AREA URNS AUTO: 4 /HPF (ref 0–1)
TACROLIMUS BLD-MCNC: 6.4 UG/L (ref 5–15)
TIBC SERPL-MCNC: 225 UG/DL (ref 240–430)
TME LAST DOSE: NORMAL H
TRANS CELLS #/AREA URNS HPF: <1 /HPF (ref 0–1)
UROBILINOGEN UR STRIP-MCNC: NORMAL MG/DL (ref 0–2)
WBC # BLD AUTO: 4 10E9/L (ref 4–11)
WBC #/AREA URNS AUTO: 2 /HPF (ref 0–5)

## 2020-02-17 PROCEDURE — 83540 ASSAY OF IRON: CPT | Performed by: INTERNAL MEDICINE

## 2020-02-17 PROCEDURE — 87186 SC STD MICRODIL/AGAR DIL: CPT | Performed by: TRANSPLANT SURGERY

## 2020-02-17 PROCEDURE — 82728 ASSAY OF FERRITIN: CPT | Performed by: INTERNAL MEDICINE

## 2020-02-17 PROCEDURE — 81001 URINALYSIS AUTO W/SCOPE: CPT | Performed by: TRANSPLANT SURGERY

## 2020-02-17 PROCEDURE — 83550 IRON BINDING TEST: CPT | Performed by: INTERNAL MEDICINE

## 2020-02-17 PROCEDURE — 87086 URINE CULTURE/COLONY COUNT: CPT | Performed by: TRANSPLANT SURGERY

## 2020-02-17 PROCEDURE — 85027 COMPLETE CBC AUTOMATED: CPT | Performed by: TRANSPLANT SURGERY

## 2020-02-17 PROCEDURE — 80048 BASIC METABOLIC PNL TOTAL CA: CPT | Performed by: TRANSPLANT SURGERY

## 2020-02-17 PROCEDURE — 80197 ASSAY OF TACROLIMUS: CPT | Performed by: INTERNAL MEDICINE

## 2020-02-17 PROCEDURE — 87088 URINE BACTERIA CULTURE: CPT | Performed by: TRANSPLANT SURGERY

## 2020-02-17 NOTE — TELEPHONE ENCOUNTER
Post Kidney and Pancreas Transplant Team Conference  Date: 2/17/2020  Transplant Coordinator: Laurence Vazquez     Attendees:  [x]  Dr. Hill  [] Leonela Brewster LPN     []  Dr. Wilson [] Cinthia Cox RN [] Kiersten Chang LPN   []  Dr. Rios [] Gely Lynn RN     [] Keeley Li RN [] Anthony Hendricks, PharmD   [] Dr. Johnson [x] Melani Vazquez RN    [] Dr. Durbin [] Virgilio Ryan RN    [] Dr. Pinto [] Ophelia Oneal RN    [] Dr. Delgadillo [] Yesika Wong RN     [] Melanie Ayala RN    [] Surgery Fellow [] Nancy Maldonado RN    [] Johana Linda, NP [] Nathalia Moreno RN        Verbal Plan Read Back:   Small amount of hematuria after pyelonephritis, original disease of IgA.  Okay to check UA quarterly, per protocol.     Routed to RN Coordinator   Laurence Vazquez RN    Orders entered.

## 2020-02-17 NOTE — PROGRESS NOTES
Venipuncture was done/urine for UA/UC collected and specimens was sent to  reference lab. CXiong, RMA

## 2020-02-18 ENCOUNTER — TELEPHONE (OUTPATIENT)
Dept: TRANSPLANT | Facility: CLINIC | Age: 30
End: 2020-02-18

## 2020-02-18 DIAGNOSIS — N39.0 URINARY TRACT INFECTION: Primary | ICD-10-CM

## 2020-02-18 RX ORDER — CIPROFLOXACIN 250 MG/1
500 TABLET, FILM COATED ORAL 2 TIMES DAILY
Qty: 28 TABLET | Refills: 0 | Status: SHIPPED | OUTPATIENT
Start: 2020-02-18 | End: 2020-02-19 | Stop reason: ALTCHOICE

## 2020-02-18 NOTE — TELEPHONE ENCOUNTER
Post Kidney and Pancreas Transplant Team Conference  Date: 2/18/2020  Transplant Coordinator: Laurence Vazquez     Attendees:  [x]  Dr. Hill  [] Leonela Brewster LPN     []  Dr. Wilson [] Cinthia Cox RN [] Kiersten Chang LPN   []  Dr. Rios [] Gely Lynn RN     [] Keeley Li RN [] Anthony Hendricks, PharmD   [] Dr. Johnson [x] Melani Vazquez RN    [] Dr. Durbin [] Virgilio Ryan RN    [] Dr. Pinto [] Ophelia Oneal RN    [] Dr. Delgadillo [] Yesika Wong RN     [] Melanie Ayala RN    [] Surgery Fellow [] Nancy Maldonado RN    [] Johana Linda, NP [] Nathalia Moreno RN        Verbal Plan Read Back:   For Klebsiella UTI, cipro 500mg BID for 7 days. No need to recheck UA unless symptomatic.     Routed to RN Coordinator   Laurence Vazquez RN    Mona questions starting cipro due to risk for tendon rupture that she read on the package insert after picking up her prescription.  RNCC reached out to [facility] pharmacist who agreed to call Mona to  her on the risks / benefits of this medication.    Mona also questioned if she should simply give another urine sample, as she was unsure if the previous sample was a clean catch.  RNCC advised her that the bacteria grown on culture was not a normal urogenital anita - no additional sample needed.    Mona will notify RNCC after pharmacy  if she makes her informed decision NOT to continue to cipro and RNCC will pursue alternative Rx.    Mona was agreeable to this.

## 2020-02-19 ENCOUNTER — TELEPHONE (OUTPATIENT)
Dept: TRANSPLANT | Facility: CLINIC | Age: 30
End: 2020-02-19

## 2020-02-19 ENCOUNTER — TELEPHONE (OUTPATIENT)
Dept: PHARMACY | Facility: CLINIC | Age: 30
End: 2020-02-19

## 2020-02-19 DIAGNOSIS — N39.0 URINARY TRACT INFECTION: Primary | ICD-10-CM

## 2020-02-19 DIAGNOSIS — Z94.0 KIDNEY REPLACED BY TRANSPLANT: Primary | ICD-10-CM

## 2020-02-19 LAB
BACTERIA SPEC CULT: ABNORMAL
BACTERIA SPEC CULT: ABNORMAL
SPECIMEN SOURCE: ABNORMAL

## 2020-02-19 RX ORDER — ALBUTEROL SULFATE 0.83 MG/ML
2.5 SOLUTION RESPIRATORY (INHALATION) ONCE
Status: COMPLETED | OUTPATIENT
Start: 2020-02-19 | End: 2020-02-19

## 2020-02-19 RX ORDER — PENTAMIDINE ISETHIONATE 300 MG/300MG
300 INHALANT RESPIRATORY (INHALATION) ONCE
Status: CANCELLED | OUTPATIENT
Start: 2020-03-10

## 2020-02-19 RX ORDER — PENTAMIDINE ISETHIONATE 300 MG/300MG
300 INHALANT RESPIRATORY (INHALATION) ONCE
Status: COMPLETED | OUTPATIENT
Start: 2020-02-19 | End: 2020-02-19

## 2020-02-19 RX ORDER — ALBUTEROL SULFATE 0.83 MG/ML
2.5 SOLUTION RESPIRATORY (INHALATION) ONCE
Status: CANCELLED | OUTPATIENT
Start: 2020-03-10

## 2020-02-19 RX ORDER — CEFDINIR 300 MG/1
300 CAPSULE ORAL 2 TIMES DAILY
Qty: 20 CAPSULE | Refills: 0 | Status: SHIPPED | OUTPATIENT
Start: 2020-02-19 | End: 2020-04-16

## 2020-02-19 RX ADMIN — PENTAMIDINE ISETHIONATE 300 MG: 300 INHALANT RESPIRATORY (INHALATION) at 11:36

## 2020-02-19 RX ADMIN — ALBUTEROL SULFATE 2.5 MG: 0.83 SOLUTION RESPIRATORY (INHALATION) at 11:34

## 2020-02-19 NOTE — PROGRESS NOTES
Patient was seen today for a Pentamidine nebulizer tx ordered by Ondina Chandler.    Patient was first given 2.5 mg Albuterol nebulizer, after which Pentamidine 300 mg (Lot # [id number]) mixed with 6cc Sterile H2O was administered through a filtered nebulizer.    No adverse side effects noted by the patient.    Pre-treatment: SpO2 = 100%     HR = 77     BBS = Clear  Post-treatment: SpO2 = 100%     HR = 86   BBS = Clear     Procedure was completed today by Mona Canseco RRT

## 2020-02-19 NOTE — TELEPHONE ENCOUNTER
Pt is concerned about Ciprofloxacin possible side effect specifically peripheral neuropathy and tendon rupture.    We discussed that she is young, so her tendon rupture risk is lower than other patients, but she is on fludrocortisone (corticosteroids increase risk) and having a renal transplant does increase her risk as well.     It is unlikely that she would experience either of these side effects, but I cannot rule it out entirely. Patient would like to use an alternative product if possible. Per susceptibility panel, could consider other alternatives such as Cefdinir or Augmentin. Will forward to txp team.    Klebsiella pneumoniae     Antibiotic Interpretation Sensitivity Method Status   AMIKACIN* Sensitive <=2 ug/mL LINDA Preliminary   AMPICILLIN Resistant >=32 ug/mL LINDA Preliminary    Intrinsically Resistant   AMPICILLIN/SULBACTAM Sensitive 4 ug/mL LINDA Preliminary   CEFAZOLIN Sensitive <=4 ug/mL LINDA Preliminary    Cefazolin LINDA breakpoints are for the treatment of uncomplicated urinary tract   infections.  For the treatment of systemic infections, please contact the   laboratory for additional testing.   CEFEPIME Sensitive <=1 ug/mL LINDA Preliminary   CEFOXITIN Sensitive <=4 ug/mL LINDA Preliminary   CEFTAZIDIME Sensitive <=1 ug/mL LINDA Preliminary   CEFTRIAXONE Sensitive <=1 ug/mL LINDA Preliminary   CIPROFLOXACIN Sensitive <=0.25 ug/mL LINDA Preliminary   Ext Spect B Lac Prod* Sensitive Negative  LINDA Preliminary   GENTAMICIN Sensitive <=1 ug/mL LINDA Preliminary   LEVOFLOXACIN Sensitive <=0.12 ug/mL LINDA Preliminary   MEROPENEM* Sensitive <=0.25 ug/mL LINDA Preliminary   NITROFURANTOIN Resistant 128 ug/mL LINDA Preliminary   Piperacillin/Tazo Sensitive <=4 ug/mL LINDA Preliminary   TOBRAMYCIN Sensitive <=1 ug/mL LINDA Preliminary   Trimethoprim/Sulfa Sensitive <=1/19 ug/mL LINDA Preliminary   * Suppressed Antibiotic     Anthony Hendricks, PharmD  [facility] Pharmacist    Phone: [phone]

## 2020-02-19 NOTE — TELEPHONE ENCOUNTER
As discussed with Dr. Rios / Anthony Hendricks, PharmD:  Victor M for cefdinir 300mg BID for 10 days.    Rx sent to Mona's preferred pharmacy.   Mona voiced understanding to start Cefdinir and stop cipro.

## 2020-02-19 NOTE — TELEPHONE ENCOUNTER
Patient Call:   Calling you back from yesterday       Call back needed? Yes    Return Call Needed  Same as documented in contacts section  When to return call?: Same day: Route High Priority

## 2020-02-20 DIAGNOSIS — Z79.899 LONG TERM USE OF DRUG: ICD-10-CM

## 2020-02-20 DIAGNOSIS — Z94.0 KIDNEY REPLACED BY TRANSPLANT: ICD-10-CM

## 2020-02-20 DIAGNOSIS — Z94.0 KIDNEY REPLACED BY TRANSPLANT: Primary | ICD-10-CM

## 2020-02-20 LAB
ANION GAP SERPL CALCULATED.3IONS-SCNC: 6 MMOL/L (ref 3–14)
BUN SERPL-MCNC: 23 MG/DL (ref 7–30)
CALCIUM SERPL-MCNC: 9.3 MG/DL (ref 8.5–10.1)
CHLORIDE SERPL-SCNC: 113 MMOL/L (ref 94–109)
CO2 SERPL-SCNC: 21 MMOL/L (ref 20–32)
CREAT SERPL-MCNC: 1.07 MG/DL (ref 0.52–1.04)
ERYTHROCYTE [DISTWIDTH] IN BLOOD BY AUTOMATED COUNT: 13.2 % (ref 10–15)
GFR SERPL CREATININE-BSD FRML MDRD: 71 ML/MIN/{1.73_M2}
GLUCOSE SERPL-MCNC: 95 MG/DL (ref 70–99)
HCT VFR BLD AUTO: 34.2 % (ref 35–47)
HGB BLD-MCNC: 10.5 G/DL (ref 11.7–15.7)
MCH RBC QN AUTO: 28.9 PG (ref 26.5–33)
MCHC RBC AUTO-ENTMCNC: 30.7 G/DL (ref 31.5–36.5)
MCV RBC AUTO: 94 FL (ref 78–100)
PLATELET # BLD AUTO: 193 10E9/L (ref 150–450)
POTASSIUM SERPL-SCNC: 4.6 MMOL/L (ref 3.4–5.3)
RBC # BLD AUTO: 3.63 10E12/L (ref 3.8–5.2)
SODIUM SERPL-SCNC: 140 MMOL/L (ref 133–144)
TACROLIMUS BLD-MCNC: 5.8 UG/L (ref 5–15)
TME LAST DOSE: NORMAL H
WBC # BLD AUTO: 4.1 10E9/L (ref 4–11)

## 2020-02-20 PROCEDURE — 80048 BASIC METABOLIC PNL TOTAL CA: CPT | Performed by: INTERNAL MEDICINE

## 2020-02-20 PROCEDURE — 85027 COMPLETE CBC AUTOMATED: CPT | Performed by: INTERNAL MEDICINE

## 2020-02-20 PROCEDURE — 80197 ASSAY OF TACROLIMUS: CPT | Performed by: INTERNAL MEDICINE

## 2020-02-21 NOTE — ANESTHESIA POSTPROCEDURE EVALUATION
Anesthesia POST Procedure Evaluation    Patient: Mona Wagner   MRN:     [id number] Gender:   female   Age:    28 year old :      1990        Preoperative Diagnosis: End Stage Renal Disease   Procedure(s):  TRANSPLANT, KIDNEY, RECIPIENT,  DONOR with Ureteral Stent Placement  BACKBENCH PREPARATION, ALLOGRAFT, KIDNEY   Postop Comments: No value filed.       Anesthesia Type:  Not documented  General    Reportable Event: NO     PAIN: Uncomplicated   Sign Out status: Comfortable, Well controlled pain     PONV: No PONV   Sign Out status:  No Nausea or Vomiting     Neuro/Psych: Uneventful perioperative course   Sign Out Status: Preoperative baseline; Age appropriate mentation     Airway/Resp.: Uneventful perioperative course   Sign Out Status: Non labored breathing, age appropriate RR; Resp. Status within EXPECTED Parameters     CV: Uneventful perioperative course   Sign Out status: Appropriate BP and perfusion indices; Appropriate HR/Rhythm     Disposition:   Sign Out in:  PACU  Disposition:  Floor  Recovery Course: Uneventful  Follow-Up: Not required     Comments/Narrative:  CXR reviewed, line placement appears appropriate with no evidence of pneumothorax    Maicol Coulter MD           Last Anesthesia Record Vitals:  CRNA VITALS  8/3/2019 1143 - 8/3/2019 1243      8/3/2019             Resp Rate (observed):  4  (Abnormal)           Last PACU Vitals:  Vitals Value Taken Time   /79 8/3/2019  1:50 PM   Temp 36.6  C (97.9  F) 8/3/2019 12:45 PM   Pulse 103 8/3/2019  1:50 PM   Resp 14 8/3/2019  1:30 PM   SpO2 99 % 8/3/2019  1:52 PM   Temp src     NIBP     Pulse     SpO2     Resp     Temp     Ht Rate     Temp 2     Vitals shown include unvalidated device data.      Electronically Signed By: Maicol Coulter MD, August 3, 2019, 1:53 PM  
No complaints

## 2020-02-24 DIAGNOSIS — Z94.0 KIDNEY REPLACED BY TRANSPLANT: Primary | ICD-10-CM

## 2020-02-24 LAB
ANION GAP SERPL CALCULATED.3IONS-SCNC: 3 MMOL/L (ref 3–14)
BUN SERPL-MCNC: 35 MG/DL (ref 7–30)
CALCIUM SERPL-MCNC: 9.4 MG/DL (ref 8.5–10.1)
CHLORIDE SERPL-SCNC: 111 MMOL/L (ref 94–109)
CO2 SERPL-SCNC: 24 MMOL/L (ref 20–32)
CREAT SERPL-MCNC: 1.09 MG/DL (ref 0.52–1.04)
ERYTHROCYTE [DISTWIDTH] IN BLOOD BY AUTOMATED COUNT: 13.3 % (ref 10–15)
GFR SERPL CREATININE-BSD FRML MDRD: 69 ML/MIN/{1.73_M2}
GLUCOSE SERPL-MCNC: 93 MG/DL (ref 70–99)
HCT VFR BLD AUTO: 34.2 % (ref 35–47)
HGB BLD-MCNC: 10.5 G/DL (ref 11.7–15.7)
MCH RBC QN AUTO: 28.8 PG (ref 26.5–33)
MCHC RBC AUTO-ENTMCNC: 30.7 G/DL (ref 31.5–36.5)
MCV RBC AUTO: 94 FL (ref 78–100)
PLATELET # BLD AUTO: 217 10E9/L (ref 150–450)
POTASSIUM SERPL-SCNC: 4.6 MMOL/L (ref 3.4–5.3)
RBC # BLD AUTO: 3.64 10E12/L (ref 3.8–5.2)
SODIUM SERPL-SCNC: 138 MMOL/L (ref 133–144)
TACROLIMUS BLD-MCNC: 6.4 UG/L (ref 5–15)
TME LAST DOSE: NORMAL H
WBC # BLD AUTO: 5.1 10E9/L (ref 4–11)

## 2020-02-24 PROCEDURE — 80197 ASSAY OF TACROLIMUS: CPT | Performed by: TRANSPLANT SURGERY

## 2020-02-24 PROCEDURE — 85027 COMPLETE CBC AUTOMATED: CPT | Performed by: TRANSPLANT SURGERY

## 2020-02-24 PROCEDURE — 80048 BASIC METABOLIC PNL TOTAL CA: CPT | Performed by: TRANSPLANT SURGERY

## 2020-02-25 ENCOUNTER — TELEPHONE (OUTPATIENT)
Dept: PHARMACY | Facility: CLINIC | Age: 30
End: 2020-02-25

## 2020-02-25 DIAGNOSIS — Z79.899 LONG TERM USE OF DRUG: ICD-10-CM

## 2020-02-25 DIAGNOSIS — Z94.0 KIDNEY REPLACED BY TRANSPLANT: Primary | ICD-10-CM

## 2020-02-25 NOTE — TELEPHONE ENCOUNTER
Anemia Management Note  SUBJECTIVE/OBJECTIVE:  Referred by Dr. Nakul Hill on 2019  Primary Diagnosis: Anemia in Chronic Kidney Disease (N18.3, D63.1)     Secondary Diagnosis:  Chronic Kidney Disease, Stage 3 (N18.3)   Kidney Tx: 2019  Hgb goal range:  9-10  Epo/Darbo: Aranesp  40 mcg  every two weeks for Hgb <10.  In clinic.   Iron regimen:  NA  Labs : 2020  Recent CHARLINE use, transfusion, IV iron: PO Iron and Aranesp   RX/TX plans :2020  No history of stroke, MI and blood clots or cancers  Contact:            Ok to leave message  per consent to communicate dated 05/15/2019                              OK to speak with Jt Aleman () per consent to communicate dated 05/15/2013    Anemia Latest Ref Rng & Units 2/3/2020 2020 2/10/2020 2020 2020 2020 2020   CHARLINE Dose - - - - - - - -   Hemoglobin 11.7 - 15.7 g/dL 9.6(L) 10.3(L) 10.1(L) 10.6(L) 10.8(L) 10.5(L) 10.5(L)   TSAT 15 - 46 % - - - - 42 - -   Ferritin 12 - 150 ng/mL - - - - 1,139(H) - -     BP Readings from Last 3 Encounters:   20 124/77   20 121/78   20 111/71     Wt Readings from Last 2 Encounters:   20 61.6 kg (135 lb 14.4 oz)   20 63.3 kg (139 lb 9.6 oz)           ASSESSMENT:  Hgb:Above goal - recommend hold dose  TSat: at goal >30% Ferritin: Elevated (>1000ng/mL)    PLAN:  Hold Aranesp and RTC for hgb then aranesp if needed in 2 week(s)    Orders needed to be renewed (for next follow-up date) in EPIC: None    Iron labs due:  3/16/2020    Plan discussed with:  No call made, Lab review  Plan provided by:  solomon    NEXT FOLLOW-UP DATE:  3/10/2020    Manjula Mckinnon RN   Anemia Services  [address] [address]   [email or web address]   Office : [phone]  Fax: [phone]

## 2020-02-27 DIAGNOSIS — Z94.0 KIDNEY REPLACED BY TRANSPLANT: Primary | ICD-10-CM

## 2020-02-27 DIAGNOSIS — Z79.899 LONG TERM USE OF DRUG: ICD-10-CM

## 2020-02-27 DIAGNOSIS — Z94.0 KIDNEY REPLACED BY TRANSPLANT: ICD-10-CM

## 2020-02-27 LAB
ANION GAP SERPL CALCULATED.3IONS-SCNC: 4 MMOL/L (ref 3–14)
BUN SERPL-MCNC: 24 MG/DL (ref 7–30)
CALCIUM SERPL-MCNC: 9.5 MG/DL (ref 8.5–10.1)
CHLORIDE SERPL-SCNC: 110 MMOL/L (ref 94–109)
CO2 SERPL-SCNC: 24 MMOL/L (ref 20–32)
CREAT SERPL-MCNC: 1.07 MG/DL (ref 0.52–1.04)
ERYTHROCYTE [DISTWIDTH] IN BLOOD BY AUTOMATED COUNT: 13.1 % (ref 10–15)
GFR SERPL CREATININE-BSD FRML MDRD: 71 ML/MIN/{1.73_M2}
GLUCOSE SERPL-MCNC: 94 MG/DL (ref 70–99)
HCT VFR BLD AUTO: 35.7 % (ref 35–47)
HGB BLD-MCNC: 10.9 G/DL (ref 11.7–15.7)
MCH RBC QN AUTO: 28.8 PG (ref 26.5–33)
MCHC RBC AUTO-ENTMCNC: 30.5 G/DL (ref 31.5–36.5)
MCV RBC AUTO: 94 FL (ref 78–100)
PLATELET # BLD AUTO: 231 10E9/L (ref 150–450)
POTASSIUM SERPL-SCNC: 4.6 MMOL/L (ref 3.4–5.3)
RBC # BLD AUTO: 3.79 10E12/L (ref 3.8–5.2)
SODIUM SERPL-SCNC: 139 MMOL/L (ref 133–144)
TACROLIMUS BLD-MCNC: 6 UG/L (ref 5–15)
TME LAST DOSE: NORMAL H
WBC # BLD AUTO: 4.8 10E9/L (ref 4–11)

## 2020-02-27 PROCEDURE — 80048 BASIC METABOLIC PNL TOTAL CA: CPT | Performed by: INTERNAL MEDICINE

## 2020-02-27 PROCEDURE — 80197 ASSAY OF TACROLIMUS: CPT | Performed by: INTERNAL MEDICINE

## 2020-02-27 PROCEDURE — 85027 COMPLETE CBC AUTOMATED: CPT | Performed by: INTERNAL MEDICINE

## 2020-02-27 NOTE — PROGRESS NOTES
CBC/plts, BMP and Tac blood work drawn: See standing orders    SHAYY Prather ([facility]) Mónica [facility] [address] [place] [address]  [phone]

## 2020-02-28 ENCOUNTER — MYC MEDICAL ADVICE (OUTPATIENT)
Dept: OTHER | Age: 30
End: 2020-02-28

## 2020-03-02 ENCOUNTER — RESULTS ONLY (OUTPATIENT)
Dept: OTHER | Facility: CLINIC | Age: 30
End: 2020-03-02

## 2020-03-02 DIAGNOSIS — Z94.0 KIDNEY REPLACED BY TRANSPLANT: ICD-10-CM

## 2020-03-02 DIAGNOSIS — Z48.298 AFTERCARE FOLLOWING ORGAN TRANSPLANT: Primary | ICD-10-CM

## 2020-03-02 DIAGNOSIS — Z79.899 LONG TERM USE OF DRUG: ICD-10-CM

## 2020-03-02 LAB
ANION GAP SERPL CALCULATED.3IONS-SCNC: 6 MMOL/L (ref 3–14)
BUN SERPL-MCNC: 26 MG/DL (ref 7–30)
CALCIUM SERPL-MCNC: 9.4 MG/DL (ref 8.5–10.1)
CHLORIDE SERPL-SCNC: 114 MMOL/L (ref 94–109)
CO2 SERPL-SCNC: 21 MMOL/L (ref 20–32)
CREAT SERPL-MCNC: 1.04 MG/DL (ref 0.52–1.04)
ERYTHROCYTE [DISTWIDTH] IN BLOOD BY AUTOMATED COUNT: 13.1 % (ref 10–15)
GFR SERPL CREATININE-BSD FRML MDRD: 73 ML/MIN/{1.73_M2}
GLUCOSE SERPL-MCNC: 92 MG/DL (ref 70–99)
HCT VFR BLD AUTO: 37 % (ref 35–47)
HGB BLD-MCNC: 11.4 G/DL (ref 11.7–15.7)
MAGNESIUM SERPL-MCNC: 1.7 MG/DL (ref 1.6–2.3)
MCH RBC QN AUTO: 28.8 PG (ref 26.5–33)
MCHC RBC AUTO-ENTMCNC: 30.8 G/DL (ref 31.5–36.5)
MCV RBC AUTO: 93 FL (ref 78–100)
PHOSPHATE SERPL-MCNC: 2.6 MG/DL (ref 2.5–4.5)
PLATELET # BLD AUTO: 217 10E9/L (ref 150–450)
POTASSIUM SERPL-SCNC: 4.7 MMOL/L (ref 3.4–5.3)
RBC # BLD AUTO: 3.96 10E12/L (ref 3.8–5.2)
SODIUM SERPL-SCNC: 141 MMOL/L (ref 133–144)
TACROLIMUS BLD-MCNC: 5.5 UG/L (ref 5–15)
TME LAST DOSE: NORMAL H
WBC # BLD AUTO: 5.1 10E9/L (ref 4–11)

## 2020-03-02 PROCEDURE — 86832 HLA CLASS I HIGH DEFIN QUAL: CPT | Performed by: TRANSPLANT SURGERY

## 2020-03-02 PROCEDURE — 87799 DETECT AGENT NOS DNA QUANT: CPT | Performed by: INTERNAL MEDICINE

## 2020-03-02 PROCEDURE — 80197 ASSAY OF TACROLIMUS: CPT | Performed by: INTERNAL MEDICINE

## 2020-03-02 PROCEDURE — 80048 BASIC METABOLIC PNL TOTAL CA: CPT | Performed by: INTERNAL MEDICINE

## 2020-03-02 PROCEDURE — 83735 ASSAY OF MAGNESIUM: CPT | Performed by: INTERNAL MEDICINE

## 2020-03-02 PROCEDURE — 86833 HLA CLASS II HIGH DEFIN QUAL: CPT | Performed by: TRANSPLANT SURGERY

## 2020-03-02 PROCEDURE — 84100 ASSAY OF PHOSPHORUS: CPT | Performed by: INTERNAL MEDICINE

## 2020-03-02 PROCEDURE — 85027 COMPLETE CBC AUTOMATED: CPT | Performed by: INTERNAL MEDICINE

## 2020-03-03 DIAGNOSIS — Z94.0 KIDNEY REPLACED BY TRANSPLANT: Primary | ICD-10-CM

## 2020-03-03 RX ORDER — TACROLIMUS 1 MG/1
4 CAPSULE ORAL 2 TIMES DAILY
Qty: 240 CAPSULE | Refills: 11 | Status: SHIPPED | OUTPATIENT
Start: 2020-03-03 | End: 2020-07-29

## 2020-03-03 RX ORDER — TACROLIMUS 0.5 MG/1
0.5 CAPSULE ORAL 2 TIMES DAILY
Qty: 60 CAPSULE | Refills: 11 | Status: SHIPPED | OUTPATIENT
Start: 2020-03-03 | End: 2020-07-29

## 2020-03-03 NOTE — TELEPHONE ENCOUNTER
ISSUE:   Tacrolimus IR level 5.5 on 3/2, goal 6-8, dose 4 mg BID.    PLAN:   Please call patient and confirm this was an accurate 12-hour trough. Verify Tacrolimus IR dose 4 mg BID. Confirm no new medications or illness. Confirm no missed doses. If accurate trough and accurate dose, increase Tacrolimus IR dose to 4.5 mg BID and repeat labs as scheduled.    Laurence Vazquez RN      OUTCOME:   Spoke with patient, they confirm accurate trough level and current dose 4 mg BID. Patient confirmed dose change to 4.5 mg BID and to repeat labs in 1 days. Orders sent to preferred pharmacy for dose change and lab for repeat labs. Patient voiced understanding of plan.

## 2020-03-04 LAB
BKV DNA # SPEC NAA+PROBE: NORMAL COPIES/ML
BKV DNA SPEC NAA+PROBE-LOG#: NORMAL LOG COPIES/ML
DONOR IDENTIFICATION: NORMAL
DSA COMMENTS: NORMAL
DSA PRESENT: NO
DSA TEST METHOD: NORMAL
ORGAN: NORMAL
SA1 CELL: NORMAL
SA1 COMMENTS: NORMAL
SA1 HI RISK ABY: NORMAL
SA1 MOD RISK ABY: NORMAL
SA1 TEST METHOD: NORMAL
SA2 CELL: NORMAL
SA2 COMMENTS: NORMAL
SA2 HI RISK ABY UA: NORMAL
SA2 MOD RISK ABY: NORMAL
SA2 TEST METHOD: NORMAL
SPECIMEN SOURCE: NORMAL
UNACCEPTABLE ANTIGEN: NORMAL
UNOS CPRA: 25

## 2020-03-05 DIAGNOSIS — Z79.899 LONG TERM USE OF DRUG: ICD-10-CM

## 2020-03-05 DIAGNOSIS — Z94.0 KIDNEY REPLACED BY TRANSPLANT: Primary | ICD-10-CM

## 2020-03-05 DIAGNOSIS — Z94.0 KIDNEY REPLACED BY TRANSPLANT: ICD-10-CM

## 2020-03-05 DIAGNOSIS — Z94.0 KIDNEY TRANSPLANTED: ICD-10-CM

## 2020-03-05 LAB
ALBUMIN UR-MCNC: NEGATIVE MG/DL
ANION GAP SERPL CALCULATED.3IONS-SCNC: 6 MMOL/L (ref 3–14)
APPEARANCE UR: CLEAR
BILIRUB UR QL STRIP: NEGATIVE
BUN SERPL-MCNC: 31 MG/DL (ref 7–30)
CALCIUM SERPL-MCNC: 9.7 MG/DL (ref 8.5–10.1)
CHLORIDE SERPL-SCNC: 109 MMOL/L (ref 94–109)
CO2 SERPL-SCNC: 23 MMOL/L (ref 20–32)
COLOR UR AUTO: ABNORMAL
CREAT SERPL-MCNC: 1.12 MG/DL (ref 0.52–1.04)
ERYTHROCYTE [DISTWIDTH] IN BLOOD BY AUTOMATED COUNT: 12.8 % (ref 10–15)
GFR SERPL CREATININE-BSD FRML MDRD: 67 ML/MIN/{1.73_M2}
GLUCOSE SERPL-MCNC: 93 MG/DL (ref 70–99)
GLUCOSE UR STRIP-MCNC: NEGATIVE MG/DL
HCT VFR BLD AUTO: 37.8 % (ref 35–47)
HGB BLD-MCNC: 11.5 G/DL (ref 11.7–15.7)
HGB UR QL STRIP: NEGATIVE
KETONES UR STRIP-MCNC: NEGATIVE MG/DL
LEUKOCYTE ESTERASE UR QL STRIP: NEGATIVE
MCH RBC QN AUTO: 29 PG (ref 26.5–33)
MCHC RBC AUTO-ENTMCNC: 30.4 G/DL (ref 31.5–36.5)
MCV RBC AUTO: 95 FL (ref 78–100)
NITRATE UR QL: NEGATIVE
PH UR STRIP: 6.5 PH (ref 5–7)
PLATELET # BLD AUTO: 227 10E9/L (ref 150–450)
POTASSIUM SERPL-SCNC: 4.9 MMOL/L (ref 3.4–5.3)
RBC # BLD AUTO: 3.97 10E12/L (ref 3.8–5.2)
RBC #/AREA URNS AUTO: 1 /HPF (ref 0–2)
SODIUM SERPL-SCNC: 137 MMOL/L (ref 133–144)
SOURCE: ABNORMAL
SP GR UR STRIP: 1.01 (ref 1–1.03)
SQUAMOUS #/AREA URNS AUTO: 2 /HPF (ref 0–1)
TACROLIMUS BLD-MCNC: 7 UG/L (ref 5–15)
TME LAST DOSE: NORMAL H
UROBILINOGEN UR STRIP-MCNC: NORMAL MG/DL (ref 0–2)
WBC # BLD AUTO: 5.2 10E9/L (ref 4–11)
WBC #/AREA URNS AUTO: 1 /HPF (ref 0–5)

## 2020-03-05 PROCEDURE — 85027 COMPLETE CBC AUTOMATED: CPT | Performed by: INTERNAL MEDICINE

## 2020-03-05 PROCEDURE — 81001 URINALYSIS AUTO W/SCOPE: CPT | Performed by: INTERNAL MEDICINE

## 2020-03-05 PROCEDURE — 80048 BASIC METABOLIC PNL TOTAL CA: CPT | Performed by: INTERNAL MEDICINE

## 2020-03-05 PROCEDURE — 80197 ASSAY OF TACROLIMUS: CPT | Performed by: INTERNAL MEDICINE

## 2020-03-05 RX ORDER — CINACALCET 30 MG/1
TABLET, FILM COATED ORAL
Qty: 30 TABLET | Refills: 2 | Status: SHIPPED | OUTPATIENT
Start: 2020-03-05 | End: 2020-06-10

## 2020-03-05 NOTE — PROGRESS NOTES
Standing orders drawn for CBC, BMP and Tacrolimus and sent to [facility].       Edison Sales, SHAYY Hogan ([facility]) Mónica LUCAS   [facility] [address] [place] [address]  [phone]

## 2020-03-06 ENCOUNTER — OFFICE VISIT (OUTPATIENT)
Dept: NEPHROLOGY | Facility: CLINIC | Age: 30
End: 2020-03-06
Attending: GENETIC COUNSELOR, MS
Payer: MEDICARE

## 2020-03-06 ENCOUNTER — OFFICE VISIT (OUTPATIENT)
Dept: NEPHROLOGY | Facility: CLINIC | Age: 30
End: 2020-03-06
Attending: INTERNAL MEDICINE
Payer: MEDICARE

## 2020-03-06 VITALS
SYSTOLIC BLOOD PRESSURE: 107 MMHG | BODY MASS INDEX: 27.09 KG/M2 | DIASTOLIC BLOOD PRESSURE: 71 MMHG | WEIGHT: 138.01 LBS | HEART RATE: 102 BPM | HEIGHT: 60 IN

## 2020-03-06 DIAGNOSIS — N18.30 CKD (CHRONIC KIDNEY DISEASE) STAGE 3, GFR 30-59 ML/MIN (H): Primary | ICD-10-CM

## 2020-03-06 DIAGNOSIS — Z13.71 ENCOUNTER FOR NONPROCREATIVE GENETIC COUNSELING AND TESTING: ICD-10-CM

## 2020-03-06 DIAGNOSIS — Z94.0 KIDNEY REPLACED BY TRANSPLANT: ICD-10-CM

## 2020-03-06 DIAGNOSIS — N18.6 END STAGE RENAL DISEASE (H): ICD-10-CM

## 2020-03-06 DIAGNOSIS — N18.6 END STAGE RENAL DISEASE (H): Primary | ICD-10-CM

## 2020-03-06 DIAGNOSIS — Z71.83 ENCOUNTER FOR NONPROCREATIVE GENETIC COUNSELING AND TESTING: ICD-10-CM

## 2020-03-06 LAB — MISCELLANEOUS TEST: NORMAL

## 2020-03-06 PROCEDURE — 96040 ZZH GENETIC COUNSELING, EACH 30 MINUTES: CPT | Mod: ZF | Performed by: GENETIC COUNSELOR, MS

## 2020-03-06 PROCEDURE — 36415 COLL VENOUS BLD VENIPUNCTURE: CPT

## 2020-03-06 PROCEDURE — G0463 HOSPITAL OUTPT CLINIC VISIT: HCPCS | Mod: ZF

## 2020-03-06 ASSESSMENT — PAIN SCALES - GENERAL: PAINLEVEL: NO PAIN (0)

## 2020-03-06 ASSESSMENT — MIFFLIN-ST. JEOR: SCORE: 1280.01

## 2020-03-06 NOTE — PATIENT INSTRUCTIONS
--------------------------------------------------------------------------------------------------  Please contact our office with any questions or concerns.     Providers book out months in advance please schedule follow up appointments as soon as possible.     [phone]     services: [phone]    On-call Nephrologist for after hours, weekends and urgent concerns: [phone].    Nephrology Office phone number: [phone] (opt.0), Fax #: [phone]    Nephrology Nurses  - Prachi Limon RN: [phone]  - Sary Britt RN: [phone]    After you have a chance to think about the genetic testing, and possibly discuss with your family, please let me know.  We will give you a follow up appointment to discuss further testing, or explain the results of any tests you have had.  I encourage you to discuss with your siblings, and to have your brother (and his son) be seen by a kidney doctor.

## 2020-03-06 NOTE — LETTER
3/6/2020      RE: Mona Wagner  [address]  [place] [address]       Outpatient Consultation    Consultation requested by Delicia Parra.      Please see full note by LEANDRO Stahl    Nephrology Clinic    Mona Wagner MRN:[id number] YOB: 1990  Date of Service: 03/08/2020  Primary care provider: Macarena Bowen  Requesting physician: Delicia Parra       REASON FOR CONSULT: Kidney disease of unknown cause with strong family history of same.    HISTORY OF PRESENT ILLNESS:   Mona Wagner is a 29 year old female who presents for evaluation of possible genetic cause of end stage kidney disease.  She recognizes the frequency of kidney disease in her family and is interested in elucidating the possible cause, and be able to inform her family members (future generations)  Ms Wagner presented in 2013 at age 23 - shortly after the death of her mother - with severe, advanced kidney failure (Cr > 20) requiring the initiation of dialysis soon after.  Prior to presentation, she had been caring for her mother who also had ESKD, but had refused dialysis.    Prior to presentation, she recalls feeling fatigue, flank pain, and nocturia.  She denies gross hematuria, dysuria, fever.  No history of rash, hair loss, mucosal ulcers, CP, or L Ext swelling.  No history of acral numbness, tingling or burning pain   She never had a kidney biopsy.  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  Ms Wagner's past history is also significant for a history of DVT in 2014 for which she was placed on warfarin.  She had severe hypertension.  In 2014, she suffered a sub arachnoid hemorrhage - in the setting of severe HTN and anticoagulation - which resulted with 2 seizures.  The seizures occurred shortly after the SAH.  She has no prior history of seizures.    She has a history galactorrhea for which she was evaluated by Dr Coles.  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   She received a  donor kidney transplant in 2019.  Post transplant:  Her baseline Cr is ~ 1.2.  She has a post transplant biopsy in 2019, which did not reveal evidence for rejection.  She had hydronephrosis of the kidney graft with placement of a ureteral stent on 19.  She has had a bout of pyelonephritis and CAMERON leading to hospitalization -2020 for which she received treatment with amoxicillin.  Ureteral stent removal on 2020    Today's visit was focused on reviewing the family history as it relates to kidney disease.  This is summarized by the Pedigree below. Neither she, nor  Her older sister had a kidney biopsy prior to starting dialysis.  Her younger brother (age 25) is believed to have proteinuria and hematuria - but he has not had further work up for kidney disease.  Her nephew reportedly as \"Small kidney(s?)\", but additional information or work up appears to have been done at this point.   Ms Aguirre's younger sister (age 23) has hypertension and \"kidney scarring\".       PAST MEDICAL HISTORY:  Past Medical History:   Diagnosis Date     Anemia in chronic kidney disease      Dialysis patient (H)      End stage renal disease on dialysis (H)      Endocarditis      History of DVT (deep vein thrombosis)      History of seizure      Hypertension      Hypothyroid      Kidney transplanted 2019    DCD DDKT. Induction with thymo 6mg/kg.     Pituitary adenoma (H)      Pyelonephritis of transplanted kidney 2020     PAST SURGICAL HISTORY:  Past Surgical History:   Procedure Laterality Date     BENCH KIDNEY N/A 8/3/2019    Procedure: BACKBENCH PREPARATION, ALLOGRAFT, KIDNEY;  Surgeon: Dorian Johnson MD;  Location: [facility]     COMBINED CYSTOSCOPY, RETROGRADES, URETEROSCOPY, LASER HOLMIUM LITHOTRIPSY URETER(S), INSERT STENT N/A 2019    Procedure: CYSTOURETEROSCOPY, WITH RETROGRADE PYELOGRAM of transplant " kidney, STENT INSERTION, Laser on standby;  Surgeon: Wally Britt MD;  [facility]     CREATE FISTULA ARTERIOVENOUS UPPER EXTREMITY  2014    Procedure: CREATE FISTULA ARTERIOVENOUS UPPER EXTREMITY;  Left Wrist Arteriovenous Fistula Placement;  Surgeon: Shashi Castro MD;  Location: [facility] OR     PERCUTANEOUS BIOPSY KIDNEY Right 2019    Procedure: Right Kidney Biopsy;  Surgeon: Deny Rios MD;  [facility]     RASTA/DIALYSIS CATHETER  12/10/2013          TRANSPLANT KIDNEY RECIPIENT  DONOR N/A 8/3/2019    Procedure: TRANSPLANT, KIDNEY, RECIPIENT,  DONOR with Ureteral Stent Placement;  Surgeon: Dorian Johnson MD;  Location: [facility] OR     MEDICATIONS:  Prescription Medications as of 3/8/2020       Rx Number Disp Refills Start End Last Dispensed Date Next Fill Date Owning Pharmacy    acetaminophen (TYLENOL) 325 MG tablet  100 tablet 3 2019    [id number] - [place], [place] - [address]  AT [facility]    Sig: Take 2 tablets (650 mg) by mouth every 4 hours as needed for mild pain    Class: E-Prescribe    Route: Oral    aspirin (ASA) 81 MG chewable tablet  36 tablet 10 2020    [id number] - [place], [place] - [address] [facility]    Sig: CHEW AND SWALLOW 1 TABLET DAILY    Class: E-Prescribe    atorvastatin (LIPITOR) 10 MG tablet  90 tablet 0 10/8/2019    [id number] - [place], [place] - [address] [facility]    Sig: Take 1 tablet (10 mg) by mouth daily    Class: E-Prescribe    Route: Oral    cefdinir 300 MG PO capsule (Ended)  20 capsule 0 2020   [id number] - [place], [place] - [address] AT [facility]    Sig: Take 1 capsule (300 mg) by mouth 2 times daily for 10 days    Class: E-Prescribe    Route: Oral    cinacalcet (SENSIPAR) 30 MG tablet  30 tablet 2 3/5/2020    [id number] - [place] - [address]  AT  [facility]    Sig: TAKE 1 TABLET(30 MG) BY MOUTH DAILY    Class: E-Prescribe    diphenhydrAMINE (BENADRYL) 25 MG tablet  60 tablet 1 2018    [id number] - [place], [place] - [address] [facility]    Sig: Take 1-2 tablets (25-50 mg) by mouth every 6 hours as needed for itching or allergies    Class: E-Prescribe    Route: Oral    EPINEPHrine (EPIPEN/ADRENACLICK/OR ANY BX GENERIC EQUIV) 0.3 MG/0.3ML injection 2-pack            Si.3 mg    Class: Historical    fludrocortisone (FLORINEF) 0.1 MG tablet  180 tablet 3 2019    [facility] #[id number] - [place], [place] - [address] [facility]    Sig: Take 0.1 mg by mouth twice a week On Tuesday and Thursday    Class: Historical    Route: Oral    hydrOXYzine (ATARAX) 10 MG tablet  20 tablet 0 2019    [facility] [address]    Sig: Take 1 tablet (10 mg) by mouth 3 times daily as needed for anxiety    Class: E-Prescribe    Route: Oral    levothyroxine (SYNTHROID/LEVOTHROID) 25 MCG tablet  105 tablet 1 2019    [facility] #[id number] - [place], [place] - [address] [facility]    Sig: Take 1 tablet (25 mcg) Tuesday, Thursday, Saturday and  and 1.5 tablets (37.5 mcg) on Monday, Wednesday and Friday every week.    Class: E-Prescribe    loperamide (IMODIUM) 2 MG capsule  60 capsule 1 2020    [facility] [address]    Sig: Take 1 capsule (2 mg) by mouth 4 times daily as needed for diarrhea    Class: E-Prescribe    Route: Oral    magnesium oxide (MAG-OX) 400 MG tablet  180 tablet 0 10/7/2019    [facility] #[id number] - [place], [place] - [address] [facility]    Sig: Take 1 tablet (400 mg) by mouth 2 times daily    Class: E-Prescribe    Route: Oral    menthol-zinc oxide (CALMOSEPTINE) 0.44-20.6 % OINT ointment  226 g 0 2020    [facility] [address]    Sig: Apply topically 4 times daily as needed for skin protection or irritation    Class: E-Prescribe    Route: Topical    MYFORTIC (BRAND) 180 MG EC tablet  180 tablet 11 8/19/2019    [place] [id number] [facility] [address]    Sig: Take 3 tablets (540 mg) by mouth 2 times daily    Class: E-Prescribe    Route: Oral    norethindrone (MICRONOR) 0.35 MG tablet  84 tablet 3 12/9/2019    [id number] - [place], [place] - [address] [facility]    Sig: Do not restart until your follow up appointment with your surgeon. Discuss restart date at that appointment.    Class: E-Prescribe    pentamidine (NEBUPENT) 300 MG neb solution    8/12/2019    [facility] [address]    Sig: Inhale 300 mg into the lungs every 28 days    Class: No Print Out    Route: Inhalation    psyllium (METAMUCIL/KONSYL) capsule  90 capsule 3 8/28/2019 8/27/2020   [facility] #[id number] - [place], [place] - [address] [facility]    Sig: Take 1 capsule by mouth daily    Class: E-Prescribe    Notes to Pharmacy: ND covered: [id number], [id number], [id number], [id number]    Route: Oral    simethicone (MYLICON) 125 MG chewable tablet  120 tablet 4 2/7/2020    [facility] #[id number] - [place], [place] - [address]  AT [facility]    Sig: Take 1 tablet (125 mg) by mouth 4 times daily as needed for intestinal gas    Class: E-Prescribe    Route: Oral    sodium bicarbonate 650 MG tablet  180 tablet 11 2/6/2020    [facility] #[id number] - [place], [place] - [address] [facility]    Sig: TAKE 3 TABLETS(1950 MG) BY MOUTH TWICE DAILY    Class: E-Prescribe    tacrolimus (GENERIC EQUIVALENT) 0.5 MG capsule  60 capsule 11 3/3/2020    [facility] #[id number] - [place], [place] - [address] [facility]     Sig: Take 1 capsule (0.5 mg) by mouth 2 times daily Total dose = 4.5 mg BID    Class: E-Prescribe    Notes to Pharmacy: T    Route: Oral    tacrolimus (GENERIC EQUIVALENT) 1 MG capsule  240 capsule 11 3/3/2020    [facility] #[id number] - [place], [place] - [address] AT [facility] [address]    Sig: Take 4 capsules (4 mg) by mouth 2 times daily Total dose = 4.5. mg BID    Class: E-Prescribe    Route: Oral    tacrolimus (GENERIC EQUIVALENT) 5 MG capsule  60 capsule 11 2/11/2020    [id number] - [place], [place] - [address] AT [facility] [address]    Sig: HOLD    Class: E-Prescribe         ALLERGIES:    Allergies   Allergen Reactions     Contrast Dye Rash     CT contrast allergy 12/14/19 rash over eyes. Need to have pre medication before a CT WITH CONTRAST      Chlorhexidine Rash     Rash at site     Sulfa Drugs Rash     Muscle stiffness of neck     Dapsone      Methemoglobinemia     Furosemide Other (See Comments)     Skin flushing     Lisinopril Swelling     angioedema     REVIEW OF SYSTEMS:  Review Of Systems  Skin: negative  Eyes: negative  Ears/Nose/Throat: negative, NO hearing loss  Respiratory: No shortness of breath, dyspnea on exertion, cough, or hemoptysis  Cardiovascular: negative for chest pain and exertional chest pain or pressure  Gastrointestinal: negative  Genitourinary: as above.  No history of UTIs or Pyelonephritis prior to transplantation  Musculoskeletal: negative for joint pain, joint swelling and joint stiffness  Neurologic: negative.  No recurrent seizures    SOCIAL HISTORY:   Social History     Socioeconomic History     Marital status:      Spouse name: Not on file     Number of children: 0     Years of education: Not on file     Highest education level: Not on file   Occupational History     Occupation: Pharmacy Technician   Social Needs     Financial resource strain: Not on file     Food insecurity     Worry: Not on file     Inability: Not on file      Transportation needs     Medical: Not on file     Non-medical: Not on file   Tobacco Use     Smoking status: Never Smoker     Smokeless tobacco: Never Used   Substance and Sexual Activity     Alcohol use: No     Alcohol/week: 0.0 standard drinks     Drug use: No     Sexual activity: Yes     Partners: Male   Lifestyle     Physical activity     Days per week: Not on file     Minutes per session: Not on file     Stress: Not on file   Relationships     Social connections     Talks on phone: Not on file     Gets together: Not on file     Attends Christianity service: Not on file     Active member of club or organization: Not on file     Attends meetings of clubs or organizations: Not on file     Relationship status: Not on file     Intimate partner violence     Fear of current or ex partner: Not on file     Emotionally abused: Not on file     Physically abused: Not on file     Forced sexual activity: Not on file   Other Topics Concern     Parent/sibling w/ CABG, MI or angioplasty before 65F 55M? Not Asked   Social History Narrative    Date of Service:5/15/2013     Name: Mona VALDOVINOS (Month, Day, Year of birth): 1990     MRN: [id number]     New Patient: Yes    Preferred Language: English     Needed: No    County of Residence: [place]    Marital Status:     Household size: 8    Number of Dependents:0     Pregnant: 0    Average Monthly Income: $ 600    Citizenship Status: Citizen    Gave Pt [facility] and [facility] applications     FAMILY MEDICAL HISTORY:   Family History   Problem Relation Age of Onset     Hypertension Sister      Diabetes Sister      Cerebrovascular Disease Sister      Kidney Disease Mother      Kidney Disease Sister      Cerebrovascular Disease Father      Coronary Artery Disease No family hx of      Breast Cancer No family hx of      Cancer - colorectal No family hx of      Ovarian Cancer No family hx of      Prostate Cancer No family hx of      Other Cancer No family hx of       "Asthma No family hx of      Anesthesia Reaction No family hx of      Deep Vein Thrombosis (DVT) No family hx of          PHYSICAL EXAM:   /71 (BP Location: Right arm, Patient Position: Chair, Cuff Size: Adult Regular)   Pulse 102   Ht 1.52 m (4' 11.84\")   Wt 62.6 kg (138 lb 0.1 oz)   BMI 27.09 kg/m    GENERAL APPEARANCE: alert and no distress  EYES: nonicteric  Extremities: no clubbing, cyanosis, or edema  SKIN: no rash  NEURO: mentation intact and speech normal  PSYCH: affect normal/bright   LABS:     CMP  Recent Labs   Lab Test 03/05/20  0850 03/02/20  0858 02/27/20  0850 02/24/20  0840  02/06/20  0852 02/03/20  1029  01/26/20  0627 01/25/20  0656  01/23/20  1301  08/30/19  2210  08/02/19  1938    141 139 138   < > 137 140   < > 142 138   < > 137   < > 141   < > 134   POTASSIUM 4.9 4.7 4.6 4.6   < > 4.9 4.4   < > 3.9 4.2   < > 4.6   < > 4.7   < > 3.6   CHLORIDE 109 114* 110* 111*   < > 110* 110*   < > 116* 112*   < > 108   < > 117*   < > 91*   CO2 23 21 24 24   < > 21 22   < > 20 20   < > 22   < > 19*   < > 35*   ANIONGAP 6 6 4 3   < > 6 8   < > 6 5   < > 7   < > 5   < > 8   GLC 93 92 94 93   < > 84 107*   < > 137* 157*   < > 109*   < > 130*   < > 82   BUN 31* 26 24 35*   < > 20 21   < > 9 16   < > 31*   < > 30   < > 22   CR 1.12* 1.04 1.07* 1.09*   < > 1.08* 1.07*   < > 1.28* 1.57*   < > 1.20*   < > 1.72*   < > 6.11*   GFRESTIMATED 67 73 71 69   < > 70 71   < > 57* 45*   < > 62   < > 40*   < > 9*   GFRESTBLACK 78 85 82 80   < > 81 82   < > 66 52*   < > 72   < > 46*   < > 10*   SHAMIR 9.7 9.4 9.5 9.4   < > 9.9 9.2   < > 9.4 9.4   < > 9.9   < > 9.8   < > 9.8   MAG  --  1.7  --   --   --  1.6  --   --  1.4* 1.9   < >  --    < >  --    < >  --    PHOS  --  2.6  --   --   --  2.7  --   --  2.7 1.7*   < >  --    < >  --    < >  --    PROTTOTAL  --   --   --   --   --   --  7.4  --   --   --   --  8.1  --  7.2  --  8.6   ALBUMIN  --   --   --   --   --   --  3.7  --   --   --   --  4.5  --  3.9  --  4.2 "   BILITOTAL  --   --   --   --   --   --  0.6  --   --   --   --  1.2  --  0.3  --  0.8   ALKPHOS  --   --   --   --   --   --  126  --   --   --   --  210*  --  167*  --  65   AST  --   --   --   --   --   --  13  --   --   --   --  9  --  <3  --  4   ALT  --   --   --   --   --   --  20  --   --   --   --  20  --  16  --  13    < > = values in this interval not displayed.     CBC  Recent Labs   Lab Test 20  0850 20  0858 20  0850 20  0840   HGB 11.5* 11.4* 10.9* 10.5*   WBC 5.2 5.1 4.8 5.1   RBC 3.97 3.96 3.79* 3.64*   HCT 37.8 37.0 35.7 34.2*   MCV 95 93 94 94   MCH 29.0 28.8 28.8 28.8   MCHC 30.4* 30.8* 30.5* 30.7*   RDW 12.8 13.1 13.1 13.3    217 231 217        URINE STUDIES  Recent Labs   Lab Test 20  0850 20  0850 20  1421 19  1141   COLOR Light Yellow Light Yellow Yellow Straw   APPEARANCE Clear Clear Clear Clear   URINEGLC Negative Negative Negative Negative   URINEBILI Negative Negative Negative Negative   URINEKETONE Negative Negative Negative Negative   SG 1.013 1.012 1.014 1.008   UBLD Negative Small* Small* Large*   URINEPH 6.5 6.5 6.0 7.0   PROTEIN Negative Negative 30* Negative   NITRITE Negative Negative Negative Negative   LEUKEST Negative Negative Large* Trace*   RBCU 1 3* 13* 2   WBCU 1 2 12* 3     Recent Labs   Lab Test 20  1035 19  1215 19  0940 13  1915   UTPG 0.13 0.15 0.24* 7.42*     ASSESSMENT AND PLAN:   Mrs Wagner is seen for genetic counseling given her extensive family history of kidney disease.  Her mother  of ESKD.  Her older sister and herself presented with severe renal failure. Neither of them underwent a kidney biopsy.  The etiology of the renal failure is unclear. The review of history, review of system is unrevealing.  There is no indication of a systemic inflammatory disease or Fabry's disease.  Although there is report of proteinuria - the presentation is not suggestive of severe nephrotic or  nephritic syndrome.  IgAN is common in  populations  - and familial IgAN is recognized, but there is no direct evidence for that diagnosis in this jessica.   The family history is most consistent with an autosomal dominant mode of transmission.  Mrs Wagner was seen by the genetic counselor Ms Giulia Krishna.  In the absence of a specific direction for a phenotype of kidney disease, the patient was offered to undergo the [facility] Kidney Code Sponsored Progressive Renal Disease Panel which includes testing for mutation in the following 17 genes:  ACTN4, ANLN, CD2AP, COL4A3, COL4A4, COL4A5, CRB2, HNF1A, INF2, LMX1B, MYO1E, NPHS1, NPHS2, PAX2, PKD2, PKHD1, TRPC6  Mrs Wagner was encouraged to discussed the kidney disease with her siblings, and to encourage those with suspected kidney disease to seek medical care.  We clarified that we are available should any members would like to be present at the time of a follow up appointment or seek counseling.    A follow up appointment was requested in 8 weeks.        Frantz Stahl MD  Division of Renal Disease and Hypertension  March 6, 2020

## 2020-03-06 NOTE — PROGRESS NOTES
Outpatient Consultation    Consultation requested by Delicia Parra.      Please see full note by LEANDRO Stahl

## 2020-03-06 NOTE — NURSING NOTE
"Peds Outpatient BP  1) Rested for 5 minutes, BP taken on bare arm, patient sitting (or supine for infants) w/ legs uncrossed? Yes   If no:N/A  2) Right arm used?Yes   If no:N/A  3) Arm circumference of largest part of upper arm (in cm):28  4) BP cuff sized used:Adult (25-32cm)   If used different size cuff then what was recommended why?N/A  5) Machine BP readin/71   Is reading >90%?no   (90% for <1 years is 90/50)  (90% for >18 years is 140/90)  *If BP is >90% take manual BP  6) Manual BP readin  7) Other comments:[facility] [[id number]]  Chief Complaint   Patient presents with     Consult     new consult     Initial /71 (BP Location: Right arm, Patient Position: Chair, Cuff Size: Adult Regular)   Pulse 102   Ht 4' 11.84\" (152 cm)   Wt 138 lb 0.1 oz (62.6 kg)   BMI 27.09 kg/m   Estimated body mass index is 27.09 kg/m  as calculated from the following:    Height as of this encounter: 4' 11.84\" (152 cm).    Weight as of this encounter: 138 lb 0.1 oz (62.6 kg).  Medication Reconciliation: complete  "

## 2020-03-08 NOTE — PROGRESS NOTES
Nephrology Clinic    Mona Wagner MRN:[id number] YOB: 1990  Date of Service: 2020  Primary care provider: Macarena Bowen  Requesting physician: Delicia Parra       REASON FOR CONSULT: Kidney disease of unknown cause with strong family history of same.    HISTORY OF PRESENT ILLNESS:   Mona Wagner is a 29 year old female who presents for evaluation of possible genetic cause of end stage kidney disease.  She recognizes the frequency of kidney disease in her family and is interested in elucidating the possible cause, and be able to inform her family members (future generations)  Ms Wagner presented in  at age 23 - shortly after the death of her mother - with severe, advanced kidney failure (Cr > 20) requiring the initiation of dialysis soon after.  Prior to presentation, she had been caring for her mother who also had ESKD, but had refused dialysis.    Prior to presentation, she recalls feeling fatigue, flank pain, and nocturia.  She denies gross hematuria, dysuria, fever.  No history of rash, hair loss, mucosal ulcers, CP, or L Ext swelling.  No history of acral numbness, tingling or burning pain   She never had a kidney biopsy.  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  Ms Wagner's past history is also significant for a history of DVT in  for which she was placed on warfarin.  She had severe hypertension.  In , she suffered a sub arachnoid hemorrhage - in the setting of severe HTN and anticoagulation - which resulted with 2 seizures.  The seizures occurred shortly after the SAH.  She has no prior history of seizures.    She has a history galactorrhea for which she was evaluated by Dr Coles.  This led to a diagnosis of hyperprolactenemia and possible pituitary adenoma which has not been confirmed.   She received a  donor kidney transplant in 2019.  Post transplant:  Her baseline Cr is ~ 1.2.  She has a post transplant biopsy in Sept  "2019, which did not reveal evidence for rejection.  She had hydronephrosis of the kidney graft with placement of a ureteral stent on 12/6/19.  She has had a bout of pyelonephritis and CAMERON leading to hospitalization January 23-26, 2020 for which she received treatment with amoxicillin.  Ureteral stent removal on 1/31/2020    Today's visit was focused on reviewing the family history as it relates to kidney disease.  This is summarized by the Pedigree below. Neither she, nor  Her older sister had a kidney biopsy prior to starting dialysis.  Her younger brother (age 25) is believed to have proteinuria and hematuria - but he has not had further work up for kidney disease.  Her nephew reportedly as \"Small kidney(s?)\", but additional information or work up appears to have been done at this point.   Ms Aguirre's younger sister (age 23) has hypertension and \"kidney scarring\".       PAST MEDICAL HISTORY:  Past Medical History:   Diagnosis Date     Anemia in chronic kidney disease      Dialysis patient (H)      End stage renal disease on dialysis (H)      Endocarditis      History of DVT (deep vein thrombosis) 2014     History of seizure 2014     Hypertension      Hypothyroid      Kidney transplanted 08/03/2019    DCD DDKT. Induction with thymo 6mg/kg.     Pituitary adenoma (H)      Pyelonephritis of transplanted kidney 01/23/2020     PAST SURGICAL HISTORY:  Past Surgical History:   Procedure Laterality Date     BENCH KIDNEY N/A 8/3/2019    Procedure: BACKBENCH PREPARATION, ALLOGRAFT, KIDNEY;  Surgeon: Dorian Johnson MD;  Location: [facility] OR     COMBINED CYSTOSCOPY, RETROGRADES, URETEROSCOPY, LASER HOLMIUM LITHOTRIPSY URETER(S), INSERT STENT N/A 12/6/2019    Procedure: CYSTOURETEROSCOPY, WITH RETROGRADE PYELOGRAM of transplant kidney, STENT INSERTION, Laser on standby;  Surgeon: Wally Britt MD;  [facility]     CREATE FISTULA ARTERIOVENOUS UPPER EXTREMITY  1/2/2014    Procedure: CREATE FISTULA ARTERIOVENOUS UPPER " EXTREMITY;  Left Wrist Arteriovenous Fistula Placement;  Surgeon: Shashi Castro MD;  Location: [facility] OR     PERCUTANEOUS BIOPSY KIDNEY Right 2019    Procedure: Right Kidney Biopsy;  Surgeon: Deny Rios MD;  [facility]     RASTA/DIALYSIS CATHETER  12/10/2013          TRANSPLANT KIDNEY RECIPIENT  DONOR N/A 8/3/2019    Procedure: TRANSPLANT, KIDNEY, RECIPIENT,  DONOR with Ureteral Stent Placement;  Surgeon: Dorian Johnson MD;  Location: [facility] OR     MEDICATIONS:  Prescription Medications as of 3/8/2020       Rx Number Disp Refills Start End Last Dispensed Date Next Fill Date Owning Pharmacy    acetaminophen (TYLENOL) 325 MG tablet  100 tablet 3 2019    [facility] #[id number] - [place], [place] - [address]    Sig: Take 2 tablets (650 mg) by mouth every 4 hours as needed for mild pain    Class: E-Prescribe    Route: Oral    aspirin (ASA) 81 MG chewable tablet  36 tablet 10 2020    [facility] #[id number] - [place], [place] - [address]    Sig: CHEW AND SWALLOW 1 TABLET DAILY    Class: E-Prescribe    atorvastatin (LIPITOR) 10 MG tablet  90 tablet 0 10/8/2019    [facility] #[id number] - [place], [place] - [address]    Sig: Take 1 tablet (10 mg) by mouth daily    Class: E-Prescribe    Route: Oral    cefdinir 300 MG PO capsule (Ended)  20 capsule 0 2020   [facility] #[id number] - [place], [place] - [address]    Sig: Take 1 capsule (300 mg) by mouth 2 times daily for 10 days    Class: E-Prescribe    Route: Oral    cinacalcet (SENSIPAR) 30 MG tablet  30 tablet 2 3/5/2020    [id number] - [place], [place] - [address]    Sig: TAKE 1 TABLET(30 MG) BY MOUTH DAILY    Class: E-Prescribe    diphenhydrAMINE (BENADRYL) 25 MG tablet  60 tablet 1 2018    [id number] - [place],  [address]    Sig: Take 1-2 tablets (25-50 mg) by mouth every 6 hours as needed for itching or allergies    Class: E-Prescribe    Route: Oral    EPINEPHrine (EPIPEN/ADRENACLICK/OR ANY BX GENERIC EQUIV) 0.3 MG/0.3ML injection 2-pack            Si.3 mg    Class: Historical    fludrocortisone (FLORINEF) 0.1 MG tablet  180 tablet 3 2019    [facility][id number] - [place], [place] - [address]    Sig: Take 0.1 mg by mouth twice a week On Tuesday and Thursday    Class: Historical    Route: Oral    hydrOXYzine (ATARAX) 10 MG tablet  20 tablet 0 2019    [address]    Sig: Take 1 tablet (10 mg) by mouth 3 times daily as needed for anxiety    Class: E-Prescribe    Route: Oral    levothyroxine (SYNTHROID/LEVOTHROID) 25 MCG tablet  105 tablet 1 2019    [facility] #[id number] - [place], [place] - [address]    Sig: Take 1 tablet (25 mcg) Tuesday, Thursday, Saturday and  and 1.5 tablets (37.5 mcg) on Monday, Wednesday and Friday every week.    Class: E-Prescribe    loperamide (IMODIUM) 2 MG capsule  60 capsule 1 2020    [address]    Sig: Take 1 capsule (2 mg) by mouth 4 times daily as needed for diarrhea    Class: E-Prescribe    Route: Oral    magnesium oxide (MAG-OX) 400 MG tablet  180 tablet 0 10/7/2019    [facility] #[id number] - [place], [place] - [address] [place] [address]    Sig: Take 1 tablet (400 mg) by mouth 2 times daily    Class: E-Prescribe    Route: Oral    menthol-zinc oxide (CALMOSEPTINE) 0.44-20.6 % OINT ointment  226 g 0 2020    [address]    Sig: Apply topically 4 times daily as needed for skin protection or irritation    Class: E-Prescribe    Route: Topical    MYFORTIC (BRAND) 180  MG EC tablet  180 tablet 11 8/19/2019    [facility] [place], [place] - [address]    Sig: Take 3 tablets (540 mg) by mouth 2 times daily    Class: E-Prescribe    Route: Oral    norethindrone (MICRONOR) 0.35 MG tablet  84 tablet 3 12/9/2019    [id number] - [place], [place] - [address] [facility]    Sig: Do not restart until your follow up appointment with your surgeon. Discuss restart date at that appointment.    Class: E-Prescribe    pentamidine (NEBUPENT) 300 MG neb solution    8/12/2019    [facility] [place], [place] - [address] [facility] [facility]    Sig: Inhale 300 mg into the lungs every 28 days    Class: No Print Out    Route: Inhalation    psyllium (METAMUCIL/KONSYL) capsule  90 capsule 3 8/28/2019 8/27/2020   [id number] - [place], [place] - [address] [facility]    Sig: Take 1 capsule by mouth daily    Class: E-Prescribe    Notes to Pharmacy: NDC covered: [id number], [id number], [id number], [id number]    Route: Oral    simethicone (MYLICON) 125 MG chewable tablet  120 tablet 4 2/7/2020    [facility] #[id number] - [place], [place] - [address] [facility]    Sig: Take 1 tablet (125 mg) by mouth 4 times daily as needed for intestinal gas    Class: E-Prescribe    Route: Oral    sodium bicarbonate 650 MG tablet  180 tablet 11 2/6/2020    [facility] #[id number] - [place], [place] - [address] [facility]    Sig: TAKE 3 TABLETS(1950 MG) BY MOUTH TWICE DAILY    Class: E-Prescribe    tacrolimus (GENERIC EQUIVALENT) 0.5 MG capsule  60 capsule 11 3/3/2020    [facility] #[id number] - [place], [place] - [address] AT [facility]    Sig: Take 1 capsule (0.5 mg) by mouth 2 times daily Total dose = 4.5 mg BID    Class: E-Prescribe    Notes to Pharmacy: T    Route: Oral    tacrolimus (GENERIC EQUIVALENT) 1 MG capsule  240 capsule 11 3/3/2020     [facility] #[id number] - [place], [place] - [address] [facility] [address]    Sig: Take 4 capsules (4 mg) by mouth 2 times daily Total dose = 4.5. mg BID    Class: E-Prescribe    Route: Oral    tacrolimus (GENERIC EQUIVALENT) 5 MG capsule  60 capsule 11 2/11/2020    [facility] #[id number] - [place], [place] - [address] [facility] [address]    Sig: HOLD    Class: E-Prescribe         ALLERGIES:    Allergies   Allergen Reactions     Contrast Dye Rash     CT contrast allergy 12/14/19 rash over eyes. Need to have pre medication before a CT WITH CONTRAST      Chlorhexidine Rash     Rash at site     Sulfa Drugs Rash     Muscle stiffness of neck     Dapsone      Methemoglobinemia     Furosemide Other (See Comments)     Skin flushing     Lisinopril Swelling     angioedema     REVIEW OF SYSTEMS:  Review Of Systems  Skin: negative  Eyes: negative  Ears/Nose/Throat: negative, NO hearing loss  Respiratory: No shortness of breath, dyspnea on exertion, cough, or hemoptysis  Cardiovascular: negative for chest pain and exertional chest pain or pressure  Gastrointestinal: negative  Genitourinary: as above.  No history of UTIs or Pyelonephritis prior to transplantation  Musculoskeletal: negative for joint pain, joint swelling and joint stiffness  Neurologic: negative.  No recurrent seizures    SOCIAL HISTORY:   Social History     Socioeconomic History     Marital status:      Spouse name: Not on file     Number of children: 0     Years of education: Not on file     Highest education level: Not on file   Occupational History     Occupation: Pharmacy Technician   Social Needs     Financial resource strain: Not on file     Food insecurity     Worry: Not on file     Inability: Not on file     Transportation needs     Medical: Not on file     Non-medical: Not on file   Tobacco Use     Smoking status: Never Smoker     Smokeless tobacco: Never Used   Substance and Sexual Activity     Alcohol use: No      Alcohol/week: 0.0 standard drinks     Drug use: No     Sexual activity: Yes     Partners: Male   Lifestyle     Physical activity     Days per week: Not on file     Minutes per session: Not on file     Stress: Not on file   Relationships     Social connections     Talks on phone: Not on file     Gets together: Not on file     Attends Mandaeism service: Not on file     Active member of club or organization: Not on file     Attends meetings of clubs or organizations: Not on file     Relationship status: Not on file     Intimate partner violence     Fear of current or ex partner: Not on file     Emotionally abused: Not on file     Physically abused: Not on file     Forced sexual activity: Not on file   Other Topics Concern     Parent/sibling w/ CABG, MI or angioplasty before 65F 55M? Not Asked   Social History Narrative    Date of Service:5/15/2013     Name: Mona VALDOVINOS (Month, Day, Year of birth): 1990     MRN: [id number]     New Patient: Yes    Preferred Language: English     Needed: No    County of Residence: [place]    Marital Status:     Household size: 8    Number of Dependents:0     Pregnant: 0    Average Monthly Income: $ 600    Citizenship Status: Citizen    Gave Pt [employer] and [employer] applications     FAMILY MEDICAL HISTORY:   Family History   Problem Relation Age of Onset     Hypertension Sister      Diabetes Sister      Cerebrovascular Disease Sister      Kidney Disease Mother      Kidney Disease Sister      Cerebrovascular Disease Father      Coronary Artery Disease No family hx of      Breast Cancer No family hx of      Cancer - colorectal No family hx of      Ovarian Cancer No family hx of      Prostate Cancer No family hx of      Other Cancer No family hx of      Asthma No family hx of      Anesthesia Reaction No family hx of      Deep Vein Thrombosis (DVT) No family hx of          PHYSICAL EXAM:   /71 (BP Location: Right arm, Patient Position: Chair, Cuff Size: Adult  "Regular)   Pulse 102   Ht 1.52 m (4' 11.84\")   Wt 62.6 kg (138 lb 0.1 oz)   BMI 27.09 kg/m    GENERAL APPEARANCE: alert and no distress  EYES: nonicteric  Extremities: no clubbing, cyanosis, or edema  SKIN: no rash  NEURO: mentation intact and speech normal  PSYCH: affect normal/bright   LABS:     CMP  Recent Labs   Lab Test 03/05/20  0850 03/02/20  0858 02/27/20  0850 02/24/20  0840  02/06/20  0852 02/03/20  1029  01/26/20  0627 01/25/20  0656  01/23/20  1301  08/30/19  2210  08/02/19  1938    141 139 138   < > 137 140   < > 142 138   < > 137   < > 141   < > 134   POTASSIUM 4.9 4.7 4.6 4.6   < > 4.9 4.4   < > 3.9 4.2   < > 4.6   < > 4.7   < > 3.6   CHLORIDE 109 114* 110* 111*   < > 110* 110*   < > 116* 112*   < > 108   < > 117*   < > 91*   CO2 23 21 24 24   < > 21 22   < > 20 20   < > 22   < > 19*   < > 35*   ANIONGAP 6 6 4 3   < > 6 8   < > 6 5   < > 7   < > 5   < > 8   GLC 93 92 94 93   < > 84 107*   < > 137* 157*   < > 109*   < > 130*   < > 82   BUN 31* 26 24 35*   < > 20 21   < > 9 16   < > 31*   < > 30   < > 22   CR 1.12* 1.04 1.07* 1.09*   < > 1.08* 1.07*   < > 1.28* 1.57*   < > 1.20*   < > 1.72*   < > 6.11*   GFRESTIMATED 67 73 71 69   < > 70 71   < > 57* 45*   < > 62   < > 40*   < > 9*   GFRESTBLACK 78 85 82 80   < > 81 82   < > 66 52*   < > 72   < > 46*   < > 10*   SHAMIR 9.7 9.4 9.5 9.4   < > 9.9 9.2   < > 9.4 9.4   < > 9.9   < > 9.8   < > 9.8   MAG  --  1.7  --   --   --  1.6  --   --  1.4* 1.9   < >  --    < >  --    < >  --    PHOS  --  2.6  --   --   --  2.7  --   --  2.7 1.7*   < >  --    < >  --    < >  --    PROTTOTAL  --   --   --   --   --   --  7.4  --   --   --   --  8.1  --  7.2  --  8.6   ALBUMIN  --   --   --   --   --   --  3.7  --   --   --   --  4.5  --  3.9  --  4.2   BILITOTAL  --   --   --   --   --   --  0.6  --   --   --   --  1.2  --  0.3  --  0.8   ALKPHOS  --   --   --   --   --   --  126  --   --   --   --  210*  --  167*  --  65   AST  --   --   --   --   --   --  13  --  "  --   --   --  9  --  <3  --  4   ALT  --   --   --   --   --   --  20  --   --   --   --  20  --  16  --  13    < > = values in this interval not displayed.     CBC  Recent Labs   Lab Test 20  0850 20  0858 20  0850 20  0840   HGB 11.5* 11.4* 10.9* 10.5*   WBC 5.2 5.1 4.8 5.1   RBC 3.97 3.96 3.79* 3.64*   HCT 37.8 37.0 35.7 34.2*   MCV 95 93 94 94   MCH 29.0 28.8 28.8 28.8   MCHC 30.4* 30.8* 30.5* 30.7*   RDW 12.8 13.1 13.1 13.3    217 231 217        URINE STUDIES  Recent Labs   Lab Test 20  0850 20  0850 20  1421 19  1141   COLOR Light Yellow Light Yellow Yellow Straw   APPEARANCE Clear Clear Clear Clear   URINEGLC Negative Negative Negative Negative   URINEBILI Negative Negative Negative Negative   URINEKETONE Negative Negative Negative Negative   SG 1.013 1.012 1.014 1.008   UBLD Negative Small* Small* Large*   URINEPH 6.5 6.5 6.0 7.0   PROTEIN Negative Negative 30* Negative   NITRITE Negative Negative Negative Negative   LEUKEST Negative Negative Large* Trace*   RBCU 1 3* 13* 2   WBCU 1 2 12* 3     Recent Labs   Lab Test 20  1035 19  1215 19  0940 13  1915   UTPG 0.13 0.15 0.24* 7.42*       ASSESSMENT AND PLAN:   Mrs Wagner is seen for genetic counseling given her extensive family history of kidney disease.  Her mother  of ESKD.  Her older sister and herself presented with severe renal failure. Neither of them underwent a kidney biopsy.  The etiology of the renal failure is unclear. The review of history, review of system is unrevealing.  There is no indication of a systemic inflammatory disease or Fabry's disease.  Although there is report of proteinuria - the presentation is not suggestive of severe nephrotic or nephritic syndrome.  IgAN is common in  populations  - and familial IgAN is recognized, but there is no direct evidence for that diagnosis in this jessica.   The family history is most consistent with an autosomal  dominant mode of transmission.  Mrs Wagner was seen by the genetic counselor Ms Giulia Krishna.  In the absence of a specific direction for a phenotype of kidney disease, the patient was offered to undergo the [facility] Kidney Code Sponsored Progressive Renal Disease Panel which includes testing for mutation in the following 17 genes:  ACTN4, ANLN, CD2AP, COL4A3, COL4A4, COL4A5, CRB2, HNF1A, INF2, LMX1B, MYO1E, NPHS1, NPHS2, PAX2, PKD2, PKHD1, TRPC6  Mrs Wagner was encouraged to discussed the kidney disease with her siblings, and to encourage those with suspected kidney disease to seek medical care.  We clarified that we are available should any members would like to be present at the time of a follow up appointment or seek counseling.    A follow up appointment was requested in 8 weeks.             Frantz Stahl MD  Division of Renal Disease and Hypertension  March 6, 2020

## 2020-03-10 ENCOUNTER — TELEPHONE (OUTPATIENT)
Dept: PHARMACY | Facility: CLINIC | Age: 30
End: 2020-03-10

## 2020-03-10 NOTE — TELEPHONE ENCOUNTER
Anemia Management Note  SUBJECTIVE/OBJECTIVE:  Referred by Dr. Nakul Hill on 2019  Primary Diagnosis: Anemia in Chronic Kidney Disease (N18.3, D63.1)     Secondary Diagnosis:  Chronic Kidney Disease, Stage 3 (N18.3)   Kidney Tx: 2019  Hgb goal range:  9-10  Epo/Darbo: Aranesp  40 mcg  every two weeks for Hgb <10.  In clinic.   Iron regimen:  NA  Labs : 2020  Recent CHARLINE use, transfusion, IV iron: PO Iron and Aranesp   RX/TX plans :2020  No history of stroke, MI and blood clots or cancers  Contact:            Ok to leave message  per consent to communicate dated 05/15/2019                              OK to speak with Jt Aleman () per consent to communicate dated 05/15/2013    Anemia Latest Ref Rng & Units 2020 2020 2020 2020 2020 3/2/2020 3/5/2020   CHARLINE Dose - - - - - - - -   Hemoglobin 11.7 - 15.7 g/dL 10.6(L) 10.8(L) 10.5(L) 10.5(L) 10.9(L) 11.4(L) 11.5(L)   TSAT 15 - 46 % - 42 - - - - -   Ferritin 12 - 150 ng/mL - 1,139(H) - - - - -     BP Readings from Last 3 Encounters:   20 107/71   20 124/77   20 121/78     Wt Readings from Last 2 Encounters:   20 62.6 kg (138 lb 0.1 oz)   20 61.6 kg (135 lb 14.4 oz)           ASSESSMENT:  Hgb:Above goal - recommend hold dose  TSat: at goal >30% Ferritin: Elevated (>1000ng/mL)    PLAN:  Hold Aranesp and RTC for hgb then aranesp if needed in 2 week(s)    Orders needed to be renewed (for next follow-up date) in EPIC: None    Iron labs due:  3/16/2020    Plan discussed with:  No call made. Mona was seen in clinic. No treatment for Anemia Needed  Plan provided by:  solomon    NEXT FOLLOW-UP DATE:  2020    Manjula Mckinnon RN   Anemia Services  [facility]  [address] [place] [id number]   [email or web address]   Office : [phone]  Fax: [phone]

## 2020-03-13 ENCOUNTER — OFFICE VISIT (OUTPATIENT)
Dept: FAMILY MEDICINE | Facility: CLINIC | Age: 30
End: 2020-03-13
Payer: MEDICARE

## 2020-03-13 VITALS
WEIGHT: 138.4 LBS | HEIGHT: 60 IN | BODY MASS INDEX: 27.17 KG/M2 | HEART RATE: 82 BPM | OXYGEN SATURATION: 96 % | TEMPERATURE: 98.2 F | DIASTOLIC BLOOD PRESSURE: 73 MMHG | SYSTOLIC BLOOD PRESSURE: 111 MMHG | RESPIRATION RATE: 20 BRPM

## 2020-03-13 DIAGNOSIS — Q51.3 BICORNATE UTERUS: ICD-10-CM

## 2020-03-13 DIAGNOSIS — Z00.00 ENCOUNTER FOR PREVENTIVE CARE: ICD-10-CM

## 2020-03-13 DIAGNOSIS — R14.0 ABDOMINAL BLOATING: Primary | ICD-10-CM

## 2020-03-13 DIAGNOSIS — Z00.00 WELLNESS EXAMINATION: Primary | ICD-10-CM

## 2020-03-13 DIAGNOSIS — Z31.69 ENCOUNTER FOR PRECONCEPTION CONSULTATION: ICD-10-CM

## 2020-03-13 LAB
BACTERIA: NORMAL
CLUE CELLS: NORMAL
MOTILE TRICHOMONAS: NEGATIVE
ODOR: POSITIVE
PH WET PREP: NORMAL (ref 3.8–4.5)
WBC WET PREP: <2 (ref 2–5)
YEAST: NORMAL

## 2020-03-13 ASSESSMENT — MIFFLIN-ST. JEOR: SCORE: 1284.28

## 2020-03-13 NOTE — PROGRESS NOTES
Annual Wellness Visit for 65 years and older    HPI  This 29 year old female presents as an established patient  Of myself who presents for an annual wellness visit.    Other issues patient wants to be addressed today:  Chief Complaint   Patient presents with     Wellness Visit     Medication Reconciliation     Needs attention       Patient Active Problem List   Diagnosis     DVT of upper extremity (deep vein thrombosis) (H)     Seizure disorder (H)     Other general symptoms(780.99)     Galactorrhea     Acquired hypothyroidism     Anemia in chronic kidney disease     Increased prolactin level (H)     Hypomagnesemia     Normocytic anemia     Thrombocytopenia (H)     Infective endocarditis     Aftercare following organ transplant     Immunosuppression (H)     Methemoglobinemia     Kidney replaced by transplant     Anxiety     Secondary renal hyperparathyroidism (H)     Vitamin D deficiency     CKD (chronic kidney disease) stage 3, GFR 30-59 ml/min (H)     Anemia of chronic renal failure, stage 3 (moderate) (H)     Stricture or kinking of ureter     Pyelonephritis     Diarrhea     Past Medical History:   Diagnosis Date     Anemia in chronic kidney disease      Dialysis patient (H)      End stage renal disease on dialysis (H)      Endocarditis      History of DVT (deep vein thrombosis) 2014     History of seizure 2014     Hypertension      Hypothyroid      Kidney transplanted 08/03/2019    DCD DDKT. Induction with thymo 6mg/kg.     Pituitary adenoma (H)      Pyelonephritis of transplanted kidney 01/23/2020       Family History   Problem Relation Age of Onset     Hypertension Sister      Diabetes Sister      Cerebrovascular Disease Sister      Kidney Disease Mother      Kidney Disease Sister      Cerebrovascular Disease Father      Coronary Artery Disease No family hx of      Breast Cancer No family hx of      Cancer - colorectal No family hx of      Ovarian Cancer No family hx of      Prostate Cancer No family hx  of      Other Cancer No family hx of      Asthma No family hx of      Anesthesia Reaction No family hx of      Deep Vein Thrombosis (DVT) No family hx of      Problem List, Family History and past Medical History reviewed and  unchanged/updated.    Nursing Notes:   Tonja Deng RN  3/13/2020 10:39 AM  Signed  Medicare Wellness Visit  Health Risk Assessment           Health Risk Assessment / Review of Systems     Constitutional: Any fevers or night sweats? No     Eyes:  Vision problems   No     Hearing Do you feel you have hearing loss?  No     Cardiovascular: Any chest pain, fast or irregular heart beat, calf pain with walking?     No           Respiratory:   Any breathing problems or cough?   No     Gastrointestinal: Any stomach or stool problems?    YES excess flatulence. Has started eating more vegetables but noted increased flatulence before diet change. Denies tobacco use, gum chewing, carbonated beverages     Genitourinary: Do you have difficulty controlling urination?   No     Muscles and Joints: Any joint stiffness or soreness?   No     Skin: Any concerning lesions or moles?   No     Nervous System: Any loss of strength or feeling, numbness or tingling, shaking, dizziness, or headache?  No     Mental Health: Any depression, anxiety or problems sleeping?    No     Cognition: Do you have any problems with your memory?  No            Medical Care     What other specialists or organizations are involved in your medical care?    Patient Care Team       Relationship Specialty Notifications Start End    Macarena Bowen MD PCP - General Family Practice  1/18/16     Phone: [phone] Fax: [phone]         [facility] [address] [facility] [address]    GEORGIA Coles MD MD INTERNAL MEDICINE - ENDOCRINOLOGY, DIABETES & METABOLISM  3/4/15     Phone: [phone] Pager: [phone] Fax: [phone]        [address] [place] [address] [address] [facility] [address]    Theresa Sapp  MD MOELLER Neurology  5/22/15     Phone: [phone] Fax: [phone]         [address] [facility] [address]    Delicia Parra MD MD Nephrology  7/26/16     Phone: [phone] Pager: [phone] Fax: [phone]        [address] [facility] [address] [address]    [facility] Pharmacist Pharmacist  10/16/19     Phone: [phone] Fax: [phone]         [facility] [address] [facility] [address]    Dorian Johnson MD MD Transplant  11/19/19     Phone: [phone] Fax: [phone]         [address] [facility] [address]      [facility] [address]    Wally Britt MD MD Urology  11/19/19     Phone: [phone] Fax: [phone]         [address] [facility] [address]    Nancy Lind, RN Registered Nurse Oncology  11/19/19     Phone: [phone] Pager: [phone]        Macarena Bowen MD Referring Physician Family Practice  11/22/19     Phone: [phone] Fax: [phone]         [facility] [address] [facility] [address]    [facility] Pharmacist Pharmacist  12/3/19     Phone: [phone] Fax: [phone]         [address] [facility] [address]    Frantz Stahl MD MD Nephrology  2/5/20     NEPH/ GC CLINIC- DISCOVERY CLINIC ([facility])    Phone: [phone] Fax: [phone]         [facility] [address] [facility] [address]    Giulia Krishna GC Genetic Counselor Genetic Counselor, MS  2/5/20     NEPH/ GC CLINIC- DISCOVERY CLINIC          Have you been to the ER or overnight in the hospital in the last year?   YES For URI and kidney infection; transplant patient         Social History / Home Safety       Marital Status:  Who lives in your household?  and children    Do you feel threatened or controlled by a partner, ex-partner or anyone in your life? No     Has anyone hurt you physically, for example by pushing, hitting, slapping or kicking you   or  forcing you to have sex? No          Does your home have any of the following safety concerns; loose rugs in the hallway,  bathrooms with no grab bars by the tub or toilet, stairs with no handrails or poorly lit areas?  No     Do you need help with dressing yourself, bathing, eating or getting around your home?  No     Do you need help with the phone, transportation, shopping, preparing meals, housework, laundry, medications or managing money?No       Risk Behaviors and Healthy Habits     History   Smoking Status     Never Smoker   Smokeless Tobacco     Never Used     How many servings of fruits and vegetables do you eat a day? 3-4/day    Exercise: 2-3 days/week for an average of 30-45 minutes walking  and running     Do you frequently drive without a seatbelt? No     Do you use tobacco?  No     Do you use any other drugs? No         Do you use alcohol?No      Frailty Assessment            Have you lost 10 or more pounds unintentionally in the previous year? No     How difficult is walking from one room to the other on the same level?not   No     Is it difficult to lift or carry something as heavy as 10 pounds?not   No    Compared with most (men/women) your age, would you say  that you are more active, less active, or about the same? less   Yes but stated now became more active in the last week as she joined a gym and is using the treadmill    Timed Up and Go: 7 seconds, even and steady gait  FALL RISK ASSESSMENT 3/13/2020   Fallen 2 or more times in the past year? No   Any fall with injury in the past year? No         Advance Directives:   Discussed with patient and family as appropriate. Has patient  completed advance directives and/or a living will? no  Given advance directive. Advised on how to complete and importance of having an advance directive. Patient verbalized understanding and agreeable to complete.     Tonja Deng RN      Are you sexually active?  Yes with    If yes, with men, women, or both?  Male  If yes, do you more than one current partner?No  If yes, are you using condoms?  No  Have you had any sexually transmitted infections? No   Any sexual concerns? No     FOR WOMEN  What year did you stop having periods? N/a currently has periods  Any vaginal bleeding in the last year? Yes.  Since transplant periods have been infreqent.  Reports that her cycles are currently   Have you ever had an abnormal Pap smear? No      Frailty Assessment    1. Weight loss (>5% in year)  No  Wt Readings from Last 5 Encounters:   03/13/20 62.8 kg (138 lb 6.4 oz)   03/06/20 62.6 kg (138 lb 0.1 oz)   02/03/20 61.6 kg (135 lb 14.4 oz)   01/25/20 63.3 kg (139 lb 9.6 oz)   12/20/19 62.6 kg (138 lb)       2. Exhaustion (perceived effort for a given activity)   How difficult is walking from one room to the other on the same level?not   No     3. Weakness (handgrip strength)   How difficult is lifting or carrying something as heavy as 10 pounds? not   No    4. Decreased physical activity    Compared with most (men/women) your age, would you say  that you are more active, less active, or about the same? less   Yes    5. Slow gait speed (timed up and go > 12 sec.)  No  FALL RISK ASSESSMENT 3/13/2020   Fallen 2 or more times in the past year? No   Any fall with injury in the past year? No       Frailty screen score: 1    Frail Assessment:1-2 Prefrail        EVALUATION OF COGNITIVE FUNCTION  Mood/affect:Normal  Appearance:Normal  Family member/caregiver input: Normal    PHQ-2 Score:   PHQ-2 ( 1999 Pfizer) 3/13/2020 3/6/2020   Q1: Little interest or pleasure in doing things 0 0   Q2: Feeling down, depressed or hopeless 0 0   PHQ-2 Score 0 0       PHQ-9 Score:   No flowsheet data found.    Mini Cog Test: normal    Cognitive screen is:Negative      Other Assessments:    The ASCVD Risk score (Rupasrikanth VEGA Jr., et al., 2013) failed to calculate for the following reasons:    The 2013 ASCVD risk score is only valid for ages 40 to 79        Corrected  Visual acuity: wears glasses  Hearing evaluation if done: No        Advance Directives: Discussed with patient and family as appropriate. Has patient  completed advance directives and/or a living will? yes    Immunization History   Administered Date(s) Administered     DTAP (<7y) 09/22/1997     Flu, Unspecified 11/01/2018     HEPA 12/31/2008, 09/04/2009     HPV 12/31/2008, 09/04/2009, 06/10/2010     HPV Quadrivalent 12/31/2008, 09/04/2009, 06/10/2010     Hep B, Peds or Adolescent 09/25/1997, 04/22/2005, 08/03/2005     HepA-Peds, Unspecified 09/04/2009     HepA-ped 2 Dose 12/31/2008     HepB 09/02/1997, 04/22/2005, 08/03/2005     Influenza (IIV3) PF 12/31/2008, 12/23/2013, 10/15/2015, 09/22/2016, 09/25/2017, 09/28/2018     Influenza Vaccine IM > 6 months Valent IIV4 09/19/2014, 10/07/2019     MMR 04/22/2005     Mantoux Tuberculin Skin Test 12/12/2013     Meningococcal (Menactra ) 09/04/2009     Meningococcal (Menomune ) 09/04/2009     OPV, trivalent, live 09/22/1997     Pneumococcal 23 valent 12/30/2013, 12/28/2018     Poliovirus, inactivated (IPV) 09/22/1997     TD (ADULT, 7+) 04/22/2005     TDAP Vaccine (Adacel) 09/04/2009     Tdap (Adacel,Boostrix) 09/04/2009     Varicella 12/31/2008, 09/04/2009     Reviewed Immunization Record Today  Physical Exam  Vitals: /73   Pulse 82   Temp 98.2  F (36.8  C)   Resp 20   Ht 1.524 m (5')   Wt 62.8 kg (138 lb 6.4 oz)   SpO2 96%   BMI 27.03 kg/m    BMI= Body mass index is 27.03 kg/m .  EXAM:  GEN: NAD  HEENT: head atraumatic, normocephalic, moist mucous membranes, eyes anicteric  CV: RRR w/o M/R/G  PULM: CTAB  ABD: soft, non-tender, no appreciable masses, no guarding, BS present  Neuro: alert and oriented x 3, CN II-XII intact, normal gross motor movements  Psych: appropriate  Ext: no peripheral edema  : pap smear collected.  Examination normal    Assessment and plan:  1. Abdominal bloating  - alpha-d-galactosidase (BEANO) tablet; Take 1 tablet by mouth 3 times  daily as needed for intestinal gas (with meals rich in vegetables.)  Dispense: 90 tablet; Refill: 1  - HPV [place] PCR ([facility])    2. Bicornate uterus-    3. Encounter for preconception consultation-known history ESRD s/p transplant, history bicornate uterus, desires discussion of pregnancy options.  Recommend discussion with MFM well before she and  would like to conceive.  Patient is in agreement with this plan.    - [facility] REFERRAL-PRECONCEPTION    4. Encounter for preventive care  - GYN Cytology ([facility])  - Wet Prep ([facility])    Options for treatment and follow-up care were reviewed with the Mona Kristy  and/or guardian engaged in the decision making process and verbalized  understanding of the options discussed and agreed with the final plan.    Macarena Bowen MD

## 2020-03-13 NOTE — NURSING NOTE
Medicare Wellness Visit  Health Risk Assessment           Health Risk Assessment / Review of Systems     Constitutional: Any fevers or night sweats? No     Eyes:  Vision problems   No     Hearing Do you feel you have hearing loss?  No     Cardiovascular: Any chest pain, fast or irregular heart beat, calf pain with walking?     No           Respiratory:   Any breathing problems or cough?   No     Gastrointestinal: Any stomach or stool problems?    YES excess flatulence. Has started eating more vegetables but noted increased flatulence before diet change. Denies tobacco use, gum chewing, carbonated beverages     Genitourinary: Do you have difficulty controlling urination?   No     Muscles and Joints: Any joint stiffness or soreness?   No     Skin: Any concerning lesions or moles?   No     Nervous System: Any loss of strength or feeling, numbness or tingling, shaking, dizziness, or headache?  No     Mental Health: Any depression, anxiety or problems sleeping?    No     Cognition: Do you have any problems with your memory?  No            Medical Care     What other specialists or organizations are involved in your medical care?    Patient Care Team       Relationship Specialty Notifications Start End    Macarena Bowen MD PCP - General Family Practice  1/18/16     Phone: [phone] Fax: [phone]         [facility] [address] [facility] [address]    GEORGIA Coles MD MD INTERNAL MEDICINE - ENDOCRINOLOGY, DIABETES & METABOLISM  3/4/15     Phone: [phone] Pager: [phone] Fax: [phone]        [address] [facility] [address] [facility] [address]    Theresa Sapp MD MD Neurology  5/22/15     Phone: [phone] Fax: [phone]         [address] [facility] [address]    Delicia Parra MD MD Nephrology  7/26/16     Phone: [phone] Pager: [phone] Fax: [phone] [address] [facility] [address] [address]    Jericho Morton [facility] Pharmacist  Pharmacist  10/16/19     Phone: [phone] Fax: [phone]         [facility] [address] [facility] [address]    Dorian Johnson MD MD Transplant  11/19/19     Phone: [phone] Fax: [phone]         [address] [facility]       [facility] [address]    Wally Britt MD MD Urology  11/19/19     Phone: [phone] Fax: [phone]         [address] [facility] [address]    Nancy Lind, RN Registered Nurse Oncology  11/19/19     Phone: [phone] Pager: [phone]        Macarena Bowen MD Referring Physician Family Practice  11/22/19     Phone: [phone] Fax: [phone]         [facility] [address] [facility] [address]    Anthony Hendricks, [facility] Pharmacist Pharmacist  12/3/19     Phone: [phone] Fax: [phone]         [address] [facility] [address]    Frantz Stahl MD MD Nephrology  2/5/20     NEPH/ GC CLINIC- [facility] CLINIC [facility]    Phone: [phone] Fax: [phone]         [facility]ENS[facility] [address] [facility] [address]    Giulia Krishna GC Genetic Counselor Genetic Counselor, MS  2/5/20     NEPH/ GC CLINIC- DISCOVERY CLINIC          Have you been to the ER or overnight in the hospital in the last year?   YES For URI and kidney infection; transplant patient         Social History / Home Safety       Marital Status:  Who lives in your household?  and children    Do you feel threatened or controlled by a partner, ex-partner or anyone in your life? No     Has anyone hurt you physically, for example by pushing, hitting, slapping or kicking you   or forcing you to have sex? No          Does your home have any of the following safety concerns; loose rugs in the hallway,  bathrooms with no grab bars by the tub or toilet, stairs with no handrails or poorly lit areas?  No     Do you need help with dressing yourself, bathing, eating or getting around your  home?  No     Do you need help with the phone, transportation, shopping, preparing meals, housework, laundry, medications or managing money?No       Risk Behaviors and Healthy Habits     History   Smoking Status     Never Smoker   Smokeless Tobacco     Never Used     How many servings of fruits and vegetables do you eat a day? 3-4/day    Exercise: 2-3 days/week for an average of 30-45 minutes walking  and running     Do you frequently drive without a seatbelt? No     Do you use tobacco?  No     Do you use any other drugs? No         Do you use alcohol?No      Frailty Assessment            Have you lost 10 or more pounds unintentionally in the previous year? No     How difficult is walking from one room to the other on the same level?not   No     Is it difficult to lift or carry something as heavy as 10 pounds?not   No    Compared with most (men/women) your age, would you say  that you are more active, less active, or about the same? less   Yes but stated now became more active in the last week as she joined a gym and is using the treadmill    Timed Up and Go: 7 seconds, even and steady gait  FALL RISK ASSESSMENT 3/13/2020   Fallen 2 or more times in the past year? No   Any fall with injury in the past year? No         Advance Directives:   Discussed with patient and family as appropriate. Has patient  completed advance directives and/or a living will? no  Given advance directive. Advised on how to complete and importance of having an advance directive. Patient verbalized understanding and agreeable to complete.     Tonja Deng RN

## 2020-03-13 NOTE — PATIENT INSTRUCTIONS
PERSONAL PREVENTIVE SERVICES PLAN - SERVICES     Review these tests with your medical staff then decide which ones you want and take this page home for your reference      SCREENING TESTS     Description   Year of Last Screening   Recommended Today?   Heart disease screening blood tests    Cholesterol level Reducing cholesterol can reduce your risk of heart attacks by 25%.  Screening is recommended yearly if you are at risk of heart disease otherwise every 4-5 years 08/02/19 No; is up to date.   Diabetes screening tests    Hemoglobin A1c blood test   Finding and treating diabetes early can reduce complications.  Screening recommended/covered yearly if you have high blood pressure, high cholesterol, obesity (BMI >30), or a history of high blood glucose tests; or 2 of the following: family history of diabetes, overweight (BMI >25 but <30), age 65 years or older, and a history of diabetes of pregnancy or gave birth to baby weighing more than 9 lbs. 02/03/20  5.7% No; is up to date.   Hepatitis B screening Finding hepatitis B early can reduce complications.  Screening is recommended for persons with selected risk factors. 08/03/19 No; is up to date.   Hepatitis C screening Finding hepatitis C early can reduce complications.  Screening is recommended for all persons born from 1945 through 1965 and for those with selected other risk factors.  08/02/19 No; is up to date.   HIV screening Finding HIV early can reduce complications.  Screening is recommended for persons with risk factors for HIV infection. 08/02/19 No; is up to date.   Glaucoma screening Early detection of glaucoma can prevent blindness.   Please talk to your eye doctor about this.       SCREENING TESTS     Description   Year of Last Screening   Recommended Today?   Colorectal cancer screening    Fecal occult blood test     Screening colonoscopy Screening for colon cancer has been shown to reduce death from colon cancer by 25-30%. Screening recommended to  start at 50 years and continuing until age 75 years.   - No; is not indicated today   Breast Cancer Screening (women)    Mammogram Mammogram screening for breast cancer has been shown to reduce the risk of dying from breast cancer and prolong life. Screening is recommended every 1-2 years for women aged 50 to 74 years.  - No: is not indicated today.   Cervical Cancer screening (women)    Pap Cervical pap smears can reduce cervical cancer. Screening is recommended annually if high risk (history of abnormal pap smears) otherwise every 2-3 years, stop screening at 65 years of age if history of normal paps. 10/04/16 Yes; Recommended    Screening for Osteoporosis:  Bone mass measurements (women)    Dexa Scan Screening and treating Osteoporosis can reduce the risk of hip and spine fractures. Screening is recommended in women 65 years or older and in women and men at risk of osteoporosis. - No: is not indicated today.   Screening for Lung Cancer     Low-dose CT scanning Screening can reduce mortality in persons aged 55-80 who have smoked at least 30 pack-years and who are either still smoking or have quit in the past 15 years. - No: is not indicated today.   Abdominal Aortic Aneurysm (AAA) screening    Ultrasound (US)   An aneurysm treated before rupture is very safe -a ruptured aneurysm can be fatal.  Screening  by US for AAA is limited to patients who meet one of the following criteria:    Men who are 65-75 years old and have smoked more than 100 cigarettes in their lifetime    Anyone with a family history of abdominal aortic aneurysm - No: is not indicated today.     Here are your recommended immunizations.  Take this home for your reference.                                                    IMMUNIZATIONS Description Recommend today?     Influenza (Flu shot) Prevents flu; should get every year No; is up to date.   PCV 13 Pneumonia vaccination; you get it once No: is not indicated today.   PPSV 23 Second pneumonia  vaccination; usually get it 1 year after PCV 13 No; is up to date.   Zoster (Shingles) Prevents shingles; you get it once  (Check with Part D insurance for coverage, must receive at a pharmacy, not clinic) No: is not indicated today.   Tetanus Prevents tetanus; once every 10 years Yes; Recommended      Hepatitis B  If you have any of the following risk factors you should be immunized for hepatitis B: severe kidney disease, people who live in the same house as a carrier of Hepatitis B virus, people who live in  institutions (e.g. nursing homes or group homes), homosexual men, patients with hemophilia who received Factor VIII or IX concentrates, abusers of illicit injectable drugs No; is up to date.      PATIENT INSTRUCTIONS    Yearly exam:     See your health care provider every year in order to review changes in your health, review medicines that you take, and discuss preventive care needs such as immunizations and cancer screening.    Get a flu shot each year.     Advance Directives:    If you have not done so, you are encouraged to complete advance directives and/or a living will.   More information about advance directives can be found at: http://www.[place]med.org/advocacy/Key-Issues/Advance-Directives    Nutrition:     Eat at least 5 servings of fruits and vegetables each day.     Eat whole-grain bread, whole-wheat pasta and brown rice instead of white grains and rice.     Talk to your doctor about Calcium and Vitamin D.     Lifestyle:    Exercise for at least 150 minutes a week (30 minutes a day, 5 days a week). This will help you control your weight and prevent disease.     Limit alcohol to one drink per day.     If you smoke, try to quit - your doctor will be happy to help.     Wear sunscreen to prevent skin cancer.     See your dentist every six months for an exam and cleaning.     See your eye doctor every 1 to 2 years to screen for conditions such as glaucoma, macular degeneration and  cataracts.                            Contraception:  -continue birth control pills to prevent pregnancy  -I also recommend condoms to prevent pregnancy     We have made a referral to [facility] to talk about what being pregnant would be like for you    FOr gas try taking Beano with meals that have a lot of fiber or vegetables.  You can take beano with simethicone if you need it.     We ordered a wet prep and a pap smear today.  I will let you know the results of those tests.     [facility]  [facility]  [address], [address]  [address] [place] [address]  Appointments: [phone]  Provider Referrals: [phone]  Information: [phone]

## 2020-03-13 NOTE — PROGRESS NOTES
Name:  Mona Wagner  :   1990  MRN:   [id number]  Date of service: Mar 6, 2020  Primary Provider: Macarena Bowen  Referring Provider: Delicia Parra    PRESENTING INFORMATION   Reason for consultation:  A consultation in the [facility] was requested for Mona, a 29 year old female, for evaluation of The primary encounter diagnosis was CKD (chronic kidney disease) stage 3, GFR 30-59 ml/min (H). A diagnosis of Kidney replaced by transplant was also pertinent to this visit.     History is obtained from Patient and EMR. I met with the family at the request of Dr. Andrade and Dr. Stahl to obtain a personal and family history, discuss possible genetic contributions to her symptoms, and to obtain informed consent for genetic testing. Please see their accompanying note for further information about today's evaluation.     HPI:  Mona is a 29 year old female who presents to [facility] for initial evaluation.    Mona has a history of end stage renal disease. She has no history of renal biopsy, therefore the previous diagnosis of IgA nephropathy is not confirmed. On 8/3/19 she has a renal transplant. There is a family history of kidney disease, please see family history section for additional information.    Mona also has a history of possible pituitary microadenoma identified on MRI in . Her latest pituitary MRIw/o contrast did not show a definite sellar mass, but had to be done without contrast. This raised into question whether the patient has active microadenoma vs hyperprolactinemia associated with ESRD vs associated with hypothyroidism.     She has hypothyroidism. In addition she has galactorrhea secondary to hyperprolactinemia thought to be secondary to hypothroidism and possible pituitary microaddenoma; it has improved with initiation of thryoid therapy. In  she had two probable secondary generalized seizures in the esting of right frontal  subarachnoid hemhorrage and is tapering off seizure medications. Mona has a history of infective endocarditis, DVT of upper extremity, hypomagnesemia, thrombocytopenia, hair loss-likely stress induced, and bicornate uterus.         Patient Active Problem List   Diagnosis     DVT of upper extremity (deep vein thrombosis) (H)     Seizure disorder (H)     Other general symptoms(780.99)     Galactorrhea     Acquired hypothyroidism     Anemia in chronic kidney disease     Increased prolactin level (H)     Hypomagnesemia     Normocytic anemia     Thrombocytopenia (H)     Infective endocarditis-history of.  1/2019.  resolved.      Aftercare following organ transplant     Immunosuppression (H)     Methemoglobinemia     Kidney replaced by transplant     Anxiety     Secondary renal hyperparathyroidism (H)     Vitamin D deficiency     CKD (chronic kidney disease) stage 3, GFR 30-59 ml/min (H)     Anemia of chronic renal failure, stage 3 (moderate) (H)     Stricture or kinking of ureter     Pyelonephritis     Diarrhea     Bicornate uterus     Normal Pap 3/2020.  repeat 3/2023         Past Surgical History:  Past Surgical History:   Procedure Laterality Date     BENCH KIDNEY N/A 8/3/2019    Procedure: BACKBENCH PREPARATION, ALLOGRAFT, KIDNEY;  Surgeon: Dorian Johnson MD;  Location: [facility] OR     COMBINED CYSTOSCOPY, RETROGRADES, URETEROSCOPY, LASER HOLMIUM LITHOTRIPSY URETER(S), INSERT STENT N/A 12/6/2019    Procedure: CYSTOURETEROSCOPY, WITH RETROGRADE PYELOGRAM of transplant kidney, STENT INSERTION, Laser on standby;  Surgeon: Wally Britt MD;  Location: [facility] OR     CREATE FISTULA ARTERIOVENOUS UPPER EXTREMITY  1/2/2014    Procedure: CREATE FISTULA ARTERIOVENOUS UPPER EXTREMITY;  Left Wrist Arteriovenous Fistula Placement;  Surgeon: Shashi Castro MD;  Location: [facility] OR     PERCUTANEOUS BIOPSY KIDNEY Right 9/17/2019    Procedure: Right Kidney Biopsy;  Surgeon: Deny Rios MD;  Location: [facility] OR      "RASTA/DIALYSIS CATHETER  12/10/2013          TRANSPLANT KIDNEY RECIPIENT  DONOR N/A 8/3/2019    Procedure: TRANSPLANT, KIDNEY, RECIPIENT,  DONOR with Ureteral Stent Placement;  Surgeon: Dorian Johnson MD;  [facility]          FAMILY HISTORY  A three generation pedigree was obtained and scanned into the electronic medical record. The relevant portions are described below:      Siblings-     Sister (28y)- kidney disease diagnosed at 23y, no biopsy, s/p renal transplant in 2019. Also has history of gallbladder removal and parathyroid removal. No children    Brother (25y)- Protinuria, infrequent urination despite drinking a lot of fluids. Has not had nephrology evaluation/follow up. Has three sons (Rosario's nephews); one had \"small\" kidneys prenatally, no testing postnatally.     Sister (23y)- scarring in kidneys and HTN. No nephrology follow up or treatment. No children    Brother (17y)- having bleeding complications from h. Pylori infection. No known kidney issues.     Mother- (d. 32y)  from complications of kidney disease that was diagnosed 8 months before she . She had no renal biopsy and was not on dialysis. She had a h/o proteinuria. She was diagnosed with parotid gland cancer at 25y.     Maternal Relatives-     One aunt (d.30s) from suspected kidney diease. Her symptoms included  tiredness, decreased appetite and swelling. She has two sons (Mona's first cousins) in their 20s with known kidney disease.     Grandmother (d.20s) cause of death is unknown. Suspected kidney disease due to swelling, tiredness, and \"belly aches\"    Grandfather (d.73y) cause of death unknown. H/o diabetes, HTN and stroke.    Father- (55y) with HTN and Parkinson's disease.     Paternal Relatives-     Two aunts with diabetes    One aunt  inher 60s from a heart issue    One uncle with kidney failure s/p transplant and h/o HTN.     One uncle  in his 70s who's \"legs didn't work " "right-crippled\"    One uncle with HTN and heart issues    Paternal grandparents had one child who  at 1-2 months and history of at least one miscarriage vs stilbirth.     Family history is otherwise largely non-contributory. There were no other reports of eye disease, heart disease, kidney disease, HTN, birth defects, developmental delay, intellectual disability, recurrent pregnancy loss, stillbirth, cancer diagnosed under age 50y, early-onset vision/hearing loss, muscle weakness, sudden death, infertility, or genetic testing. Maternal ancestry is [place] and paternal ancestry is [place]. Consanguinity was denied- Mona said she was unsure as both sides of her family are from the same town/area, but she did not know of any immediate consanguineous relationships.     DISCUSSION  We discussed information on genetic kidney disease especially as it relates to Mona. We also discussed the concepts of genetic, environmental and multifactorial etiology. We reviewed basic genetic concepts (DNA/genes/chromosomes), possible genetic etiology of the symptoms, and genetic testing options.     Mona's features are strongly suspicious for an underlying unifying diagnosis. However, a diagnosis cannot be determined by review of medical history, previous labs, or physical examination alone. We recommend a gene panel as the most appropriate, cost-effective approach to testing as the patient s features can overlap manypossible genetic conditions that cannot be clinically distinguished from one another.    It can be important to know if there is an underlying genetic cause for kidney disease for several reasons. First and foremost, this can be important for Mona's own health. It is possible that an underlying cause may also predispose her to other health risks. Knowing about these additional health risks can help us stay ahead of her healthcare to more appropriately screen for other complications. Secondly, discovering an " underlying reason may help predict the chance for the family to have another child with similar healthcare needs. Finally, having a specific underlying diagnosis can sometimes help individuals receive the services they need to help reach their full potential in school, in work, or in day to day life.     We reviewed the availability of genetic testing via Next Generation Sequencing (NGS) for analysis of genes known to be associated with kidney disease, with the aim of determining what condition is causing Mona's  symptoms.     We went on to discuss the details, limitations, and possible outcomes of NGS. In particular, We discussed that there are three possible results from NGS:    Negative: meaning normal or no mutations are identified in the genes that were tested/sequenced    Positive: meaning a mutation that is known to be associated with a particular set of symptoms is identified    Variant of uncertain significance (VUS): meaning a change in the DNA sequence of a particular gene was seen but there is not enough information or data yet to know if it explains the symptoms. If a VUS is identified, testing of other relatives may be helpful to provide clarification.  In most cases, identification of a VUS does not confirm a diagnosis and does not result in any clinically actionable recommendations.    We discussed the potential benefits of genetic testing and why this genetic testing is medically indicated. A positive result will help determine the etiology of the kidney disease noted in Mona and may guide the medical management for her.  Also, if a genetic cause is found for Camelias kidney disease, it will give us a more accurate risk assessment for other family members, particularly future children for Mona 's parents/siblings and Mona's future children.    Next Generation Sequencing is a well established technology utilized by all molecular genetic labs throughout the country for identifying disease-causing  mutations in various genes.  Next Generation Sequencing is currently the standard of care for genetic testing of single genes.  The recommended testing for Mona  is DIAGNOSTIC testing, and it is NOT investigational.    We also reviewed limitations of NGS technology. NGS panels test a subset of our genes that are chosen based on an individual's symptoms. NGS does not test for all genetic conditions, for example NGS is unable to detect balanced chromosome rearrangements, triplet repeat disorders, or copy number variation.  Therefore, if the NGS panel is inconclusive we may consider additional testing options.     The patient wishes to pursue genetic testing today. The patient was given the option of completing a gene panel through [facility] for a charge or through [facility] [facility] as part of the [facility] sponsored testing program. The [employer] [facility] Sponsored program offers a 17-gene Progressive Renal Disease Gene Panel. There is no cost to the patient (except blood draw fees), however, the patient's data is provided to a third party pharmaceutical company who is funding the program. The patient is comfortable proceeding with the [facility] panel.     The patient provided written informed consent for the testing. We will plan to follow-up with the family by phone when results are returned. Additional questions or concerns were denied.    Cancer Counseling:  The family history is notable for a mother with parotid gland cancer diagnosed at 25y. Although parotid gland cancer is not typically genetic the young age still raises concern for a hereditary cancer predisposition syndrome. This individual and/or other family members can consider referral to a cancer genetic counselor for further evaluation of this risk and discussion of genetic testing options. Regardless of whether a genetic cause for this history is identified, all family members should notify their doctors of this family history to ensure  proper screening. A cancer risk management program handout was provided to the family today.       It was a pleasure meeting Mona today. She was encouraged to reach out to me if they have any further questions.       Plan:  1.  Blood was drawn and will be sent to [facility] for a 17-gene Progressive Renal Disease Gene Panel through the [facility] Panel.  3.. Results will be returned by phone, and a follow-up appointment will be scheduled at that time.  4. Contact information was provided should any questions arise in the future.      Giulia Krishna MS, [facility]  Certified Genetic Counselor   [facility]  Phone: [phone]    Time spent in consultation face to face was approximately 60 minutes.    CC:No letter

## 2020-03-14 NOTE — ADDENDUM NOTE
Addended by: ISAAC WARREN on: 3/14/2020 12:00 AM     Modules accepted: Orders, Level of Service, SmartSet

## 2020-03-16 DIAGNOSIS — Z94.0 KIDNEY REPLACED BY TRANSPLANT: Primary | ICD-10-CM

## 2020-03-16 LAB
FINAL DIAGNOSIS: NORMAL
HPV 16 DNA: NEGATIVE
HPV 18 DNA: NEGATIVE
HPV SOURCE: NORMAL
OTHER HR HPV: NEGATIVE
SPECIMEN DESCRIPTION: NORMAL

## 2020-03-16 RX ORDER — PENTAMIDINE ISETHIONATE 300 MG/300MG
300 INHALANT RESPIRATORY (INHALATION) ONCE
Status: CANCELLED | OUTPATIENT
Start: 2020-03-18

## 2020-03-16 RX ORDER — ALBUTEROL SULFATE 0.83 MG/ML
2.5 SOLUTION RESPIRATORY (INHALATION) ONCE
Status: COMPLETED | OUTPATIENT
Start: 2020-03-16 | End: 2020-03-16

## 2020-03-16 RX ORDER — ALBUTEROL SULFATE 0.83 MG/ML
2.5 SOLUTION RESPIRATORY (INHALATION) ONCE
Status: CANCELLED | OUTPATIENT
Start: 2020-03-18

## 2020-03-16 RX ORDER — PENTAMIDINE ISETHIONATE 300 MG/300MG
300 INHALANT RESPIRATORY (INHALATION) ONCE
Status: COMPLETED | OUTPATIENT
Start: 2020-03-16 | End: 2020-03-16

## 2020-03-16 RX ADMIN — PENTAMIDINE ISETHIONATE 300 MG: 300 INHALANT RESPIRATORY (INHALATION) at 10:42

## 2020-03-16 RX ADMIN — ALBUTEROL SULFATE 2.5 MG: 0.83 SOLUTION RESPIRATORY (INHALATION) at 10:39

## 2020-03-16 NOTE — PROGRESS NOTES
Mona Wagner is here for pentamidine neb treatment per Ondina Chandler  Patient was first given 2.5 mg albuterol as additional treatment.  No complications noted during or following albuterol neb.  Patient was then given NebuPent 300 mg mixed with 6 mL sterile water.  Patient was able to complete pentamidine neb with no complications noted.  Procedure performed by CURRY Tang

## 2020-03-18 DIAGNOSIS — B96.89 BACTERIAL VAGINOSIS: Primary | ICD-10-CM

## 2020-03-18 DIAGNOSIS — N76.0 BACTERIAL VAGINOSIS: Primary | ICD-10-CM

## 2020-03-18 RX ORDER — METRONIDAZOLE 7.5 MG/G
1 GEL VAGINAL DAILY
Qty: 70 G | Refills: 0 | Status: SHIPPED | OUTPATIENT
Start: 2020-03-18 | End: 2020-04-16

## 2020-03-19 ENCOUNTER — TELEPHONE (OUTPATIENT)
Dept: MATERNAL FETAL MEDICINE | Facility: CLINIC | Age: 30
End: 2020-03-19

## 2020-03-19 NOTE — TELEPHONE ENCOUNTER
Writer called and spoke with Mona and told her that due to Covid 19 [facility] isn't scheduling any preconception consults at this Pt verbalized understanding. Delicia Parker RN

## 2020-03-23 ENCOUNTER — RECORDS - HEALTHEAST (OUTPATIENT)
Dept: ADMINISTRATIVE | Facility: OTHER | Age: 30
End: 2020-03-23

## 2020-03-23 LAB
CYTOLOGY CVX/VAG DOC THIN PREP: NORMAL
HIGH RISK?: NO
HPV REFLEX?: NORMAL
LAB AP ABNORMAL BLEEDING: NO
LAB AP BIRTH CONTROL/HORMONES: NORMAL
LAB AP CERVICAL APPEARANCE: NORMAL
LAB AP PATIENT STATUS: NORMAL
LAB AP PREVIOUS ABNL: NORMAL
LAB AP PREVIOUS NORMAL: NORMAL
LAST MENS PERIOD: NORMAL
PATH REPORT.COMMENTS IMP SPEC: NORMAL
PATH REPORT.COMMENTS IMP SPEC: NORMAL
SPECIMEN ADEQUACY:: NORMAL

## 2020-03-24 PROBLEM — Z12.4 CERVICAL CANCER SCREENING: Status: ACTIVE | Noted: 2020-03-24

## 2020-03-25 DIAGNOSIS — E06.3 HYPOTHYROIDISM DUE TO HASHIMOTO'S THYROIDITIS: Primary | ICD-10-CM

## 2020-03-25 DIAGNOSIS — E22.9 PITUITARY MICROADENOMA WITH HYPERPROLACTINEMIA (H): ICD-10-CM

## 2020-03-25 DIAGNOSIS — D35.2 PITUITARY MICROADENOMA WITH HYPERPROLACTINEMIA (H): ICD-10-CM

## 2020-03-30 ENCOUNTER — TELEPHONE (OUTPATIENT)
Dept: CONSULT | Facility: CLINIC | Age: 30
End: 2020-03-30

## 2020-03-30 NOTE — TELEPHONE ENCOUNTER
Called Mona to set up a telephone appointment with a genetic counselor. An appointment has been made.    [facility]  [facility]    Department of Genetics  P:[phone]

## 2020-04-03 ENCOUNTER — VIRTUAL VISIT (OUTPATIENT)
Dept: CONSULT | Facility: CLINIC | Age: 30
End: 2020-04-03
Attending: GENETIC COUNSELOR, MS
Payer: MEDICARE

## 2020-04-03 DIAGNOSIS — N18.30 CKD (CHRONIC KIDNEY DISEASE) STAGE 3, GFR 30-59 ML/MIN (H): ICD-10-CM

## 2020-04-03 DIAGNOSIS — Z94.0 KIDNEY REPLACED BY TRANSPLANT: Primary | ICD-10-CM

## 2020-04-03 PROCEDURE — 40000072 ZZH STATISTIC GENETIC COUNSELING, < 16 MIN: Mod: TEL,ZF | Performed by: GENETIC COUNSELOR, MS

## 2020-04-03 NOTE — PROGRESS NOTES
"Mona Wagner is a 29 year old female who is being evaluated via a billable telephone visit.      The patient has been notified of following:     \"This telephone visit will be conducted via a call between you and your physician/provider. We have found that certain health care needs can be provided without the need for a physical exam.  This service lets us provide the care you need with a short phone conversation.  If a prescription is necessary we can send it directly to your pharmacy.  If lab work is needed we can place an order for that and you can then stop by our lab to have the test done at a later time.    If during the course of the call the physician/provider feels a telephone visit is not appropriate, you will not be charged for this service.\"     Patient has given verbal consent for Telephone visit?  Yes    Mona Wagner complains of  No chief complaint on file.      I have reviewed and updated the patient's Past Medical History, Social History, Family History and Medication List.    ALLERGIES  Contrast dye; Chlorhexidine; Sulfa drugs; Dapsone; Furosemide; and Lisinopril    Additional provider notes:    Name:  Mona aWgner  :   1990  MRN:   [id number]  Date of service: Apr 3, 2020  Primary Provider: Macarena Bowen  Referring Provider: No ref. provider found    PRESENTING INFORMATION   Reason for consultation:  Mona, a 29 year old female, returns to the [facility] for results review.      I spoke with the patient to discuss the results of her genetic testing.     HPI:  Mona has a history of end stage renal disease. She has no history of renal biopsy, therefore the previous diagnosis of IgA nephropathy is not confirmed. On 8/3/19 she has a renal transplant. There is a family history of kidney disease.     Mona also has a history of possible pituitary microadenoma identified on MRI in . Her latest pituitary MRIw/o contrast did not show a definite sellar mass, " but had to be done without contrast. This raised into question whether the patient has active microadenoma vs hyperprolactinemia associated with ESRD vs associated with hypothyroidism.      She has hypothyroidism. In addition she has galactorrhea secondary to hyperprolactinemia thought to be secondary to hypothroidism and possible pituitary microaddenoma; it has improved with initiation of thryoid therapy. In 2014 she had two probable secondary generalized seizures in the esting of right frontal subarachnoid hemhorrage and is tapering off seizure medications. Mona has a history of infective endocarditis, DVT of upper extremity, hypomagnesemia, thrombocytopenia, hair loss-likely stress induced, and bicornate uterus.     At her last visit we initiated the [facility] Progressive Renal Disease Panel ] was completed to look for a genetic cause for the patient's kidney disease.     Patient Active Problem List   Diagnosis     DVT of upper extremity (deep vein thrombosis) (H)     Seizure disorder (H)     Other general symptoms(780.99)     Galactorrhea     Acquired hypothyroidism     Anemia in chronic kidney disease     Increased prolactin level (H)     Hypomagnesemia     Normocytic anemia     Thrombocytopenia (H)     Infective endocarditis-history of.  1/2019.  resolved.      Aftercare following organ transplant     Immunosuppression (H)     Methemoglobinemia     Kidney replaced by transplant     Anxiety     Secondary renal hyperparathyroidism (H)     Vitamin D deficiency     CKD (chronic kidney disease) stage 3, GFR 30-59 ml/min (H)     Anemia of chronic renal failure, stage 3 (moderate) (H)     Stricture or kinking of ureter     Pyelonephritis     Diarrhea     Bicornate uterus     Normal Pap 3/2020.  repeat 3/2023     FAMILY HISTORY  Obtained at previous visit please see prior note.    DISCUSSION  We reviewed the results of Mona's genetic testing. The patient was appropriate, all their questions were answered, and they  expressed understanding of the material.    We discussed basic genetic concepts (DNA/genes/chromosomes), inheritance, and genetic testing. We reviewed the details, limitations, and possible outcomes of NGS. In particular, We discussed that there are three possible results from NGS:    Negative: meaning normal or no mutations are identified in the genes that were tested/sequenced    Positive: meaning a mutation that is known to be associated with a particular set of symptoms is identified    Variant of uncertain significance (VUS): meaning a change in the DNA sequence of a particular gene was seen but there is not enough information or data yet to know if it explains the symptoms. If a VUS is identified, testing of other relatives may be helpful to provide clarification.  In most cases, identification of a VUS does not confirm a diagnosis and does not result in any clinically actionable recommendations.    The [facility] Progressive Renal Disease Panel was completed to look for a genetic cause for the patient's kidney disease. This panel was part of the sponsored Kidney Code program. 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  Since this change is a VUS it is unclear if it is the explaination of Mona's symptoms. In addition, we do not make any management recommendations based on a VUS.    Pathogenic variants in the PAX2 gene are associated with autosomal dominant papillorenal syndrome. There is also preliminary evidence that PAX2 pathogenic variants may be associated with autosomal dominant focal segmental glomerulosclerosis (FSGS) ([employer] UID: [id number]; PMID: 70476761).  We discussed information on PAX2-related conditions especially as it relates to Mona. In particular we reviewed natural history, management guidelines, and recurrence risk.    PAX2-related conditions are inherited in what is called an autosomal dominant pattern.  Individuals have two  copies of each gene, one that they inherit from their father and one that they inherit from their mother.  Individuals with PAX2-related conditions have one pathogenic variant that causes one copy of their gene to not work properly, while the other is able to function.  This single pathogenic variant is sufficient to cause the disorder.  If an individual has a pathogenic variant causing a PAX2-related condition, then with each pregnancy there would be a 50% chance to have an affected child and a 50% chance to have an unaffected child.  However, as we mentioned previously, there can be variability in the symptoms of individuals, even within families.    Sometimes individuals with PAX2-related conditions have the condition due to a new (de kaushik) genetic change, that is found just within them and is not inherited. In some cases, a parent may have a very mild type of the condition and may not be recognized to have the condition until they have a child who has more features of the condition. If a parent also has PAX2-related condition, then with each pregnancy there would be a 50% chance to have an affected child and a 50% chance to have an unaffected child. If neither parent has PAX2-related condition, there would be a <1% chance to have another child with PAX2-related condition. This <1% chance includes the chance for germline mosaicism. Germline mosaicism occurs when a variant is present in multiple parental egg or sperm cells, but not in cells of other areas of the body.    Based on this result we recommend familial testing to further clarify the significance of the variant through segregation analysis. We recommend testing the patient's sister who has had a kidney transplant (Older Sister: Pili Wagner :1991, [phone] primary language is English) and her father who is unaffected (Father: Stephan Wagner : 1965, [phone] primary language is oun). This testing is available at no charge (except  blood charge if drawn) through the [facility] VUS Resolution Program. If these individuals are not interested in participating then we can consider including Mona's youngest brother with no symptoms or other two siblings with possible kidney disease but no formal work up. Mona will provide my phone number to her sister and father. If they are interested they will call me to coordinate testing, they can leave a message and I can call back with  if needed. Mona provided consent for me to disclose her information to these individuals.     It was a pleasure talking with Mona today. She was encouraged to reach out to me if she has any further questions.     Plan:  1. Wait for call from family members interested in participating in family segregation studies. Will follow up with Mona if no contact from family   2. Contact information was provided should any questions arise in the future.  3. Continue to follow with specialists as recommended    Phone call duration: 25 minutes    Giulia Krishna MS, [facility]  Licensed, Certified Genetic Counselor   [facility]  Phone: [phone]  Pager: [phone]  Fax: [phone]    cc  no letter

## 2020-04-06 ENCOUNTER — TELEPHONE (OUTPATIENT)
Dept: PHARMACY | Facility: CLINIC | Age: 30
End: 2020-04-06

## 2020-04-06 DIAGNOSIS — Z94.0 KIDNEY REPLACED BY TRANSPLANT: Primary | ICD-10-CM

## 2020-04-06 DIAGNOSIS — Z79.899 LONG TERM USE OF DRUG: ICD-10-CM

## 2020-04-06 DIAGNOSIS — Z94.0 KIDNEY REPLACED BY TRANSPLANT: ICD-10-CM

## 2020-04-06 LAB
ANION GAP SERPL CALCULATED.3IONS-SCNC: 7 MMOL/L (ref 3–14)
BUN SERPL-MCNC: 25 MG/DL (ref 7–30)
CALCIUM SERPL-MCNC: 9.8 MG/DL (ref 8.5–10.1)
CHLORIDE SERPL-SCNC: 111 MMOL/L (ref 94–109)
CO2 SERPL-SCNC: 21 MMOL/L (ref 20–32)
CREAT SERPL-MCNC: 1.07 MG/DL (ref 0.52–1.04)
ERYTHROCYTE [DISTWIDTH] IN BLOOD BY AUTOMATED COUNT: 12.5 % (ref 10–15)
GFR SERPL CREATININE-BSD FRML MDRD: 71 ML/MIN/{1.73_M2}
GLUCOSE SERPL-MCNC: 94 MG/DL (ref 70–99)
HCT VFR BLD AUTO: 37.6 % (ref 35–47)
HGB BLD-MCNC: 11.7 G/DL (ref 11.7–15.7)
MCH RBC QN AUTO: 29 PG (ref 26.5–33)
MCHC RBC AUTO-ENTMCNC: 31.1 G/DL (ref 31.5–36.5)
MCV RBC AUTO: 93 FL (ref 78–100)
PLATELET # BLD AUTO: 225 10E9/L (ref 150–450)
POTASSIUM SERPL-SCNC: 4.3 MMOL/L (ref 3.4–5.3)
RBC # BLD AUTO: 4.04 10E12/L (ref 3.8–5.2)
SODIUM SERPL-SCNC: 139 MMOL/L (ref 133–144)
TACROLIMUS BLD-MCNC: 6.4 UG/L (ref 5–15)
TME LAST DOSE: NORMAL H
WBC # BLD AUTO: 7.5 10E9/L (ref 4–11)

## 2020-04-06 PROCEDURE — 80197 ASSAY OF TACROLIMUS: CPT | Performed by: INTERNAL MEDICINE

## 2020-04-06 PROCEDURE — 85027 COMPLETE CBC AUTOMATED: CPT | Performed by: INTERNAL MEDICINE

## 2020-04-06 PROCEDURE — 87799 DETECT AGENT NOS DNA QUANT: CPT | Performed by: INTERNAL MEDICINE

## 2020-04-06 PROCEDURE — 80048 BASIC METABOLIC PNL TOTAL CA: CPT | Performed by: INTERNAL MEDICINE

## 2020-04-06 NOTE — TELEPHONE ENCOUNTER
Anemia Management Note  SUBJECTIVE/OBJECTIVE:  Referred by Dr. Nakul Hlil on 2019  Primary Diagnosis: Anemia in Chronic Kidney Disease (N18.3, D63.1)     Secondary Diagnosis:  Chronic Kidney Disease, Stage 3 (N18.3)   Kidney Tx: 2019  Hgb goal range:  9-10  Epo/Darbo: Aranesp  40 mcg  every two weeks for Hgb <10.  In clinic.   Iron regimen:  NA  Labs : 2020  Recent CHARLINE use, transfusion, IV iron: PO Iron and Aranesp   RX/TX plans :2020  No history of stroke, MI and blood clots or cancers  Contact:            Ok to leave message  per consent to communicate dated 05/15/2019                              OK to speak with Jt Aleman () per consent to communicate dated 05/15/2013    Anemia Latest Ref Rng & Units 2020 2020 2020 2020 3/2/2020 3/5/2020 2020   CHARLINE Dose - - - - - - - -   Hemoglobin 11.7 - 15.7 g/dL 10.8(L) 10.5(L) 10.5(L) 10.9(L) 11.4(L) 11.5(L) 11.7   TSAT 15 - 46 % 42 - - - - - -   Ferritin 12 - 150 ng/mL 1,139(H) - - - - - -     BP Readings from Last 3 Encounters:   20 111/73   20 107/71   20 124/77     Wt Readings from Last 2 Encounters:   20 62.8 kg (138 lb 6.4 oz)   20 62.6 kg (138 lb 0.1 oz)           ASSESSMENT:  Hgb:Above goal - recommend hold dose  TSat: at goal >30% Ferritin: Elevated (>1000ng/mL)    PLAN:  Hold Aranesp and RTC for hgb then aranesp if needed in 2 week(s)    Orders needed to be renewed (for next follow-up date) in EPIC: None    Iron labs due:  Due    Plan discussed with:  No call made, Lab review  Plan provided by:  solomon    NEXT FOLLOW-UP DATE:  2020    Manjula Mckinnon RN   Anemia Services  [address] [address]   [email or web address]   Office : [phone]  Fax: [phone]

## 2020-04-11 ENCOUNTER — TELEPHONE (OUTPATIENT)
Dept: TRANSPLANT | Facility: CLINIC | Age: 30
End: 2020-04-11

## 2020-04-11 NOTE — TELEPHONE ENCOUNTER
Mona, called to ensure that it was ok for her to take midol OTC. Pt aware that it has tylenol in it and to ensure that she does not double up on taking tylenol plus midol. Patient will try one dose and see if it helps and patient aware not to use long term. Mona to call back with any further changes or concerns.

## 2020-04-15 ENCOUNTER — TELEPHONE (OUTPATIENT)
Dept: TRANSPLANT | Facility: CLINIC | Age: 30
End: 2020-04-15

## 2020-04-15 DIAGNOSIS — E83.42 HYPOMAGNESEMIA: ICD-10-CM

## 2020-04-15 DIAGNOSIS — Z94.0 KIDNEY TRANSPLANTED: ICD-10-CM

## 2020-04-15 NOTE — TELEPHONE ENCOUNTER
Mona called with low BPs.  Labs at last draw WNL.    RN[facility] confirmed frequency of florinef: 0.1mg Tue and Th.    Mona denies any recent illness. She does have her menses this week.  She drinks 1.5L daily.  She has increased her alcohol slightly, 1 drink weekly.     She reports BPs have been running 95 / 60. HR running 98 - 102.  She does report intermittent lightheadedness / dizziness.    RN[facility] advised:  Drink 2L minimum daily, more on days of alcohol intake.     She denies any N/V/D.   Some loose stool 1-2x daily (but has been normal for her).    Next pentamidine is scheduled for 4/17. CD4 due to be drawn early next month, she will schedule Pentamidine for month of May and can cancel if CD4 >200.    Call RN[facility] if BPs remain <100/60 after increasing oral hydration.    Mona voiced understanding.    Emar updated.

## 2020-04-16 RX ORDER — MAGNESIUM OXIDE 400 MG/1
400 TABLET ORAL EVERY MORNING
Qty: 180 TABLET | Refills: 0 | COMMUNITY
Start: 2020-04-16 | End: 2020-12-19

## 2020-04-16 RX ORDER — LACTOBACILLUS RHAMNOSUS GG 10B CELL
1 CAPSULE ORAL 2 TIMES DAILY
COMMUNITY
Start: 2020-04-16 | End: 2022-06-24

## 2020-04-17 DIAGNOSIS — Z94.0 KIDNEY REPLACED BY TRANSPLANT: Primary | ICD-10-CM

## 2020-04-17 RX ORDER — PENTAMIDINE ISETHIONATE 300 MG/300MG
300 INHALANT RESPIRATORY (INHALATION) ONCE
Status: COMPLETED | OUTPATIENT
Start: 2020-04-17 | End: 2020-04-17

## 2020-04-17 RX ORDER — ALBUTEROL SULFATE 0.83 MG/ML
2.5 SOLUTION RESPIRATORY (INHALATION) ONCE
Status: CANCELLED | OUTPATIENT
Start: 2020-05-05

## 2020-04-17 RX ORDER — ALBUTEROL SULFATE 0.83 MG/ML
2.5 SOLUTION RESPIRATORY (INHALATION) ONCE
Status: COMPLETED | OUTPATIENT
Start: 2020-04-17 | End: 2020-04-17

## 2020-04-17 RX ORDER — PENTAMIDINE ISETHIONATE 300 MG/300MG
300 INHALANT RESPIRATORY (INHALATION) ONCE
Status: CANCELLED | OUTPATIENT
Start: 2020-05-05

## 2020-04-17 RX ADMIN — PENTAMIDINE ISETHIONATE 300 MG: 300 INHALANT RESPIRATORY (INHALATION) at 13:13

## 2020-04-17 RX ADMIN — ALBUTEROL SULFATE 2.5 MG: 0.83 SOLUTION RESPIRATORY (INHALATION) at 13:06

## 2020-04-17 NOTE — PROGRESS NOTES
Patient was seen today for a Pentamidine nebulizer tx ordered by Dr. VILLAFANA.    Patient was first given 2.5 mg albuterol nebulizer, after which Pentamidine 300 mg (Lot # [id number]) mixed with 6cc Sterile H2O was administered through a filtered nebulizer.    No adverse side effects noted by the patient.    Pre-treatment: SpO2 = 97%     HR = 95   BBS = CLEAR  Post-treatment: SpO2 = 97%     HR = 99  BBS = CLEAR    Procedure was completed today by VALERIE WATTS RRT

## 2020-04-22 ENCOUNTER — TELEPHONE (OUTPATIENT)
Dept: PHARMACY | Facility: CLINIC | Age: 30
End: 2020-04-22

## 2020-04-22 DIAGNOSIS — D35.2 PITUITARY MICROADENOMA WITH HYPERPROLACTINEMIA (H): ICD-10-CM

## 2020-04-22 DIAGNOSIS — E22.9 PITUITARY MICROADENOMA WITH HYPERPROLACTINEMIA (H): ICD-10-CM

## 2020-04-22 DIAGNOSIS — E06.3 HYPOTHYROIDISM DUE TO HASHIMOTO'S THYROIDITIS: ICD-10-CM

## 2020-04-22 DIAGNOSIS — N18.30 CKD (CHRONIC KIDNEY DISEASE) STAGE 3, GFR 30-59 ML/MIN (H): Primary | ICD-10-CM

## 2020-04-22 LAB
PROLACTIN SERPL-MCNC: 14 UG/L (ref 3–27)
T4 FREE SERPL-MCNC: 1.02 NG/DL (ref 0.76–1.46)
TSH SERPL DL<=0.005 MIU/L-ACNC: 2.13 MU/L (ref 0.4–4)

## 2020-04-22 PROCEDURE — 84443 ASSAY THYROID STIM HORMONE: CPT | Performed by: INTERNAL MEDICINE

## 2020-04-22 PROCEDURE — 84439 ASSAY OF FREE THYROXINE: CPT | Performed by: INTERNAL MEDICINE

## 2020-04-22 PROCEDURE — 84146 ASSAY OF PROLACTIN: CPT | Performed by: INTERNAL MEDICINE

## 2020-04-22 NOTE — TELEPHONE ENCOUNTER
Follow-up with anemia management service:    Labs were not drawn today as scheduled. Hgb has been stable. Will follow up in 2 weeks.     Anemia Latest Ref Rng & Units 2020 2020 2020 2020 3/2/2020 3/5/2020 2020   CHARLINE Dose - - - - - - - -   Hemoglobin 11.7 - 15.7 g/dL 10.8(L) 10.5(L) 10.5(L) 10.9(L) 11.4(L) 11.5(L) 11.7   TSAT 15 - 46 % 42 - - - - - -   Ferritin 12 - 150 ng/mL 1,139(H) - - - - - -       Orders needed to be renewed (for next follow-up date) in EPIC: None   Med order expires: 2020   Lab orders : 2020    Follow-up call date: 2020    Manjula Mckinnon RN   Anemia Services  [facility]  [address] [place] [id number]   [email or web address]   Office : [phone]  Fax: [phone]

## 2020-04-23 NOTE — PROGRESS NOTES
"Endocrinology Follow-up Visit    Mona Wagner is a 29 year old female who is being evaluated via a billable telephone visit.      The patient has been notified of following:     \"This telephone visit will be conducted via a call between you and your physician/provider. We have found that certain health care needs can be provided without the need for a physical exam.  This service lets us provide the care you need with a short phone conversation.  If a prescription is necessary we can send it directly to your pharmacy.  If lab work is needed we can place an order for that and you can then stop by our lab to have the test done at a later time.    Telephone visits are billed at different rates depending on your insurance coverage. During this emergency period, for some insurers they may be billed the same as an in-person visit.  Please reach out to your insurance provider with any questions.    If during the course of the call the physician/provider feels a telephone visit is not appropriate, you will not be charged for this service.\"    Patient has given verbal consent for Telephone visit?  Yes    How would you like to obtain your AVS? MyChart      Due to the COVID 19 pandemic this visit was converted to a telephone visit in order to help prevent spread of infection in this high risk patient and the general population. The patient gave verbal consent for the telephone visit today.    Start time: 2:01 PM  Stop time: 2:31 PM  Total time: 30 minutes  This visit would have been billed as [id number] as an E & M code      Follow-up for: hypothyroidism and hyperprolactinemia       HPI   Katlyn Wagner is a 29 yo woman w/ PMHx of suspected IgA nephropathy & ESRD s/p kidney transplant 8/3/19, immunosuppressed w/ tacrolimus, galactorrhea secondary to hyperprolactinemia thought to be secondary to hypothyroidism and possible microadenoma, hypothyroidism who presents for follow-up of her hypothyroidism, hyperprolactinemia, and possible " pituitary microadenoma.     He is not working and has sustained most at home due to the COVID 19 epidemic.  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  She received her kidney transplant on 8/3/2019.    Galactorrhea: Briefly, her initial galactorrhea improved when she started thyroid-replacement therapy in 2014. Prolactin levels remained elevated but stable. MRI w/ contrast in 2017 showed a possible 6.7-5.3mm microadenoma. Her last pituitary MRI w/o contrast did not show a definite sellar masses, but the study had to be done without contrast. This raised into question whether pt had active microadenoma vs hyperprolactinemia associated w/ ESRD vs associated w/ hypothyroidism. She never saw a neurosurgeon or experienced peripheral vision changes.      She had one isolated episode of galactorrhea 5/2019 where she expressed milky, non-blood tinged secretions from it, similar to previous episodes of galactorrhea. Her prolactin level was 51, not significantly elevated, thus the decision was made to hold off on the MRI and continue to clinically monitor. Since then, she has not had another episode of galactorrhea. She denied any breast tenderness, expressions, or irritation and erythema from her breast. No acute vision changes or changes in visual fields (able to drive w/o concerns and has not noticed changes in her peripheral visions). Denied recent headaches. Does note headaches after her transplant, but since her tacrolimus levels have been optimized and at goals, denied any recent headaches. She is now off the keppra. Prolactin levels (after her kidney transplant) normal.    Hypothyroidism: Mona is taking levothyroxine regularly.  She is on 25 mcg 4 times a week and 37.5 mcg 3 times a week.  She denies any symptoms suggestive of of hypo-or hyperthyroidism.    She bought a treadmill that arrived last week and she has been exercising 20 to 30 minutes/day.    She is less concerned about her hair loss  has not yet seen dermatology.    She underwent genetic screening for mutations in genes associated with kidney disease.  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  It is unclear if this mutation is causally associated with her end-stage renal disease.  Her sister also has end-stage renal disease and had a kidney transplant in October 2020.  She will discuss with her sister and her brother if they have interest in performing the genetic screening.    REVIEW OF SYSTEMS  11 point ROS is as detailed in the HPI above, as below or negative      Past Medical History  Past Medical History:   Diagnosis Date     Anemia in chronic kidney disease      Dialysis patient (H)      End stage renal disease on dialysis (H)      Endocarditis      History of DVT (deep vein thrombosis) 2014     History of seizure 2014     Hypertension      Hypothyroid      Kidney transplanted 08/03/2019    DCD DDKT. Induction with thymo 6mg/kg.     Pituitary adenoma (H)      Pyelonephritis of transplanted kidney 01/23/2020     Medications  Current Outpatient Medications   Medication     acetaminophen (TYLENOL) 325 MG tablet     aspirin (ASA) 81 MG chewable tablet     atorvastatin (LIPITOR) 10 MG tablet     cinacalcet (SENSIPAR) 30 MG tablet     EPINEPHrine (EPIPEN/ADRENACLICK/OR ANY BX GENERIC EQUIV) 0.3 MG/0.3ML injection 2-pack     fludrocortisone (FLORINEF) 0.1 MG tablet     hydrOXYzine (ATARAX) 10 MG tablet     lactobacillus rhamnosus, GG, (CULTURELL) capsule     levothyroxine (SYNTHROID/LEVOTHROID) 25 MCG tablet     magnesium oxide (MAG-OX) 400 MG tablet     MYFORTIC (BRAND) 180 MG EC tablet     norethindrone (MICRONOR) 0.35 MG tablet     pentamidine (NEBUPENT) 300 MG neb solution     psyllium (METAMUCIL/KONSYL) capsule     simethicone (MYLICON) 125 MG chewable tablet     sodium bicarbonate 650 MG tablet     tacrolimus (GENERIC EQUIVALENT) 0.5 MG capsule     tacrolimus (GENERIC EQUIVALENT) 1 MG capsule     No current  facility-administered medications for this visit.        Current Outpatient Medications   Medication Sig Dispense Refill     acetaminophen (TYLENOL) 325 MG tablet Take 2 tablets (650 mg) by mouth every 4 hours as needed for mild pain 100 tablet 3     aspirin (ASA) 81 MG chewable tablet CHEW AND SWALLOW 1 TABLET DAILY 36 tablet 10     atorvastatin (LIPITOR) 10 MG tablet Take 1 tablet (10 mg) by mouth daily 90 tablet 0     cinacalcet (SENSIPAR) 30 MG tablet TAKE 1 TABLET(30 MG) BY MOUTH DAILY 30 tablet 2     EPINEPHrine (EPIPEN/ADRENACLICK/OR ANY BX GENERIC EQUIV) 0.3 MG/0.3ML injection 2-pack 0.3 mg       fludrocortisone (FLORINEF) 0.1 MG tablet Take 0.1 mg by mouth twice a week On Tuesday and Thursday 180 tablet 3     hydrOXYzine (ATARAX) 10 MG tablet Take 1 tablet (10 mg) by mouth 3 times daily as needed for anxiety 20 tablet 0     lactobacillus rhamnosus, GG, (CULTURELL) capsule Take 1 capsule by mouth daily       levothyroxine (SYNTHROID/LEVOTHROID) 25 MCG tablet Take 1 tablet (25 mcg) Tuesday, Thursday, Saturday and Sunday and 1.5 tablets (37.5 mcg) on Monday, Wednesday and Friday every week. 105 tablet 1     magnesium oxide (MAG-OX) 400 MG tablet Take 1 tablet (400 mg) by mouth daily 180 tablet 0     MYFORTIC (BRAND) 180 MG EC tablet Take 3 tablets (540 mg) by mouth 2 times daily 180 tablet 11     norethindrone (MICRONOR) 0.35 MG tablet Do not restart until your follow up appointment with your surgeon. Discuss restart date at that appointment. 84 tablet 3     pentamidine (NEBUPENT) 300 MG neb solution Inhale 300 mg into the lungs every 28 days       psyllium (METAMUCIL/KONSYL) capsule Take 1 capsule by mouth daily (Patient taking differently: Take 1 capsule by mouth daily as needed (loose stools) ) 90 capsule 3     simethicone (MYLICON) 125 MG chewable tablet Take 1 tablet (125 mg) by mouth 4 times daily as needed for intestinal gas 120 tablet 4     sodium bicarbonate 650 MG tablet TAKE 3 TABLETS(1950 MG) BY  MOUTH TWICE DAILY 180 tablet 11     tacrolimus (GENERIC EQUIVALENT) 0.5 MG capsule Take 1 capsule (0.5 mg) by mouth 2 times daily Total dose = 4.5 mg BID 60 capsule 11     tacrolimus (GENERIC EQUIVALENT) 1 MG capsule Take 4 capsules (4 mg) by mouth 2 times daily Total dose = 4.5. mg  capsule 11       Allergies  Allergies   Allergen Reactions     Contrast Dye Rash     CT contrast allergy 12/14/19 rash over eyes. Need to have pre medication before a CT WITH CONTRAST      Chlorhexidine Rash     Rash at site     Sulfa Drugs Rash     Muscle stiffness of neck     Dapsone      Methemoglobinemia     Furosemide Other (See Comments)     Skin flushing     Lisinopril Swelling     angioedema         Family History  family history includes Cerebrovascular Disease in her father and sister; Diabetes in her sister; Hypertension in her sister; Kidney Disease in her mother and sister.    Social History  Social History     Tobacco Use     Smoking status: Never Smoker     Smokeless tobacco: Never Used   Substance Use Topics     Alcohol use: No     Alcohol/week: 0.0 standard drinks     Drug use: No       Physical Exam  There were no vitals taken for this visit.  There is no height or weight on file to calculate BMI.   General:  Healthy, alert and oriented X3, NAD, answering questions appropriately.  Pulmonary: No audible wheeze or cough. Able to speak fully in complete sentences.   Neurological: Alert. Mentation intact and speech normal.  Psychological: mentation appears normal, affect normal/bright, judgement and insight intact, normal speech    Physical exam is limited due to Phone visit related to COVID-19       DATA REVIEW  ENDO PITUITARY LABS-[facility] Latest Ref Rng & Units 4/22/2020   TSH 0.40 - 4.00 mU/L 2.13   TSH 0.30 - 5.00 uIU/mL    PROLACTIN 3 - 27 ug/L 14       ASSESSMENT/PLAN:   Katlyn Wagner is a very pleasant 29 years old female patient with a past medical history of suspected IgA nephropathy & ESRD s/p kidney transplant  8/3/19, immunosuppression w/ tacrolimus, galactorrhea secondary to hyperprolactinemia thought to be secondary to hypothyroidism, end-stage renal disease and possible microadenoma, hypothyroidism in follow-up of hypothyroidism, hyperprolactinemia, and questionable pituitary microadenoma.     1. Galactorrhea and hyperprolactinemia  First episode of galactorrhea in 2014 w/ hyperprolactinemia, improved w/ thyroid hormone replacement, suspect secondary to hypothyroidism and slow clearance d/t ESRD. Initial MRI w/ concern for microadenoma, however, repeat MRI in 2017 w/o changes and did not show definitive evidence of a microadenoma, however, contrast was not used to fully evaluate for this given her ESRD. She had one isolated episode of galactorrhea in 05/2019 w/ a high, but not significantly abnormal prolactin level. Plan was to clinically monitor for recurrences. She has not had recurrences since earlier this year.  Most recent prolactin levels are within the normal range  - continue to monitor clinically   - if prolactin significantly elevated than her baseline or has neurological changes, would discuss contrast use with nephrology prior to obtaining a MRI pituitary at that time   - will consider cabergoline if indicated    2. Hypothyroidism  Clinically euthyroid after transplant.  TSH is within the normal range.  - continue levothyroxine 25mcg 4x weekly and 37.5mcg 3x weekly  - follow-up in 6 months       Return to clinic in 6 months for follow-up       No orders of the defined types were placed in this encounter.      Nidhi Coles MD PhD    Division of Endocrinology and Diabetes

## 2020-04-24 ENCOUNTER — VIRTUAL VISIT (OUTPATIENT)
Dept: ENDOCRINOLOGY | Facility: CLINIC | Age: 30
End: 2020-04-24
Payer: MEDICARE

## 2020-04-24 DIAGNOSIS — E03.9 ACQUIRED HYPOTHYROIDISM: ICD-10-CM

## 2020-04-24 RX ORDER — LEVOTHYROXINE SODIUM 25 UG/1
TABLET ORAL
Qty: 105 TABLET | Refills: 4 | Status: SHIPPED | OUTPATIENT
Start: 2020-04-24 | End: 2021-04-04

## 2020-04-27 ENCOUNTER — TELEPHONE (OUTPATIENT)
Dept: TRANSPLANT | Facility: CLINIC | Age: 30
End: 2020-04-27

## 2020-04-27 NOTE — TELEPHONE ENCOUNTER
In light of the COVID 19 pandemic, [facility] cares about your safety and we are calling to convert your scheduled in-person clinic visit to a video visit on 5/4/20 date.  The appointment will be on the same date and time.  What is the best number for the provider to call you at?  [phone]

## 2020-04-29 ENCOUNTER — TELEPHONE (OUTPATIENT)
Dept: TRANSPLANT | Facility: CLINIC | Age: 30
End: 2020-04-29

## 2020-04-29 DIAGNOSIS — Z99.2 ESRD (END STAGE RENAL DISEASE) ON DIALYSIS (H): Primary | ICD-10-CM

## 2020-04-29 DIAGNOSIS — D84.9 IMMUNOSUPPRESSED STATUS (H): ICD-10-CM

## 2020-04-29 DIAGNOSIS — Z94.0 KIDNEY REPLACED BY TRANSPLANT: ICD-10-CM

## 2020-04-29 DIAGNOSIS — Z94.0 KIDNEY REPLACED BY TRANSPLANT: Primary | ICD-10-CM

## 2020-04-29 DIAGNOSIS — N18.6 ESRD (END STAGE RENAL DISEASE) ON DIALYSIS (H): Primary | ICD-10-CM

## 2020-04-29 DIAGNOSIS — Z79.899 LONG TERM USE OF DRUG: ICD-10-CM

## 2020-04-29 LAB
ANION GAP SERPL CALCULATED.3IONS-SCNC: 6 MMOL/L (ref 3–14)
BUN SERPL-MCNC: 22 MG/DL (ref 7–30)
CALCIUM SERPL-MCNC: 9.3 MG/DL (ref 8.5–10.1)
CHLORIDE SERPL-SCNC: 108 MMOL/L (ref 94–109)
CO2 SERPL-SCNC: 23 MMOL/L (ref 20–32)
CREAT SERPL-MCNC: 1.08 MG/DL (ref 0.52–1.04)
ERYTHROCYTE [DISTWIDTH] IN BLOOD BY AUTOMATED COUNT: 12.6 % (ref 10–15)
GFR SERPL CREATININE-BSD FRML MDRD: 70 ML/MIN/{1.73_M2}
GLUCOSE SERPL-MCNC: 92 MG/DL (ref 70–99)
HCT VFR BLD AUTO: 39.5 % (ref 35–47)
HGB BLD-MCNC: 12.2 G/DL (ref 11.7–15.7)
MAGNESIUM SERPL-MCNC: 1.8 MG/DL (ref 1.6–2.3)
MCH RBC QN AUTO: 29.4 PG (ref 26.5–33)
MCHC RBC AUTO-ENTMCNC: 30.9 G/DL (ref 31.5–36.5)
MCV RBC AUTO: 95 FL (ref 78–100)
PHOSPHATE SERPL-MCNC: 2.7 MG/DL (ref 2.5–4.5)
PLATELET # BLD AUTO: 225 10E9/L (ref 150–450)
POTASSIUM SERPL-SCNC: 4.7 MMOL/L (ref 3.4–5.3)
PROT UR-MCNC: <0.05 G/L
PROT/CREAT 24H UR: NORMAL G/G CR (ref 0–0.2)
RBC # BLD AUTO: 4.15 10E12/L (ref 3.8–5.2)
SODIUM SERPL-SCNC: 137 MMOL/L (ref 133–144)
WBC # BLD AUTO: 6.1 10E9/L (ref 4–11)

## 2020-04-29 PROCEDURE — 80048 BASIC METABOLIC PNL TOTAL CA: CPT | Performed by: INTERNAL MEDICINE

## 2020-04-29 PROCEDURE — 84156 ASSAY OF PROTEIN URINE: CPT | Performed by: INTERNAL MEDICINE

## 2020-04-29 PROCEDURE — 83735 ASSAY OF MAGNESIUM: CPT | Performed by: INTERNAL MEDICINE

## 2020-04-29 PROCEDURE — 87799 DETECT AGENT NOS DNA QUANT: CPT | Performed by: INTERNAL MEDICINE

## 2020-04-29 PROCEDURE — 80197 ASSAY OF TACROLIMUS: CPT | Performed by: INTERNAL MEDICINE

## 2020-04-29 PROCEDURE — 85027 COMPLETE CBC AUTOMATED: CPT | Performed by: INTERNAL MEDICINE

## 2020-04-29 PROCEDURE — 84100 ASSAY OF PHOSPHORUS: CPT | Performed by: INTERNAL MEDICINE

## 2020-04-29 RX ORDER — DIPHENHYDRAMINE HCL 25 MG
CAPSULE ORAL
COMMUNITY
End: 2020-04-29

## 2020-04-29 NOTE — TELEPHONE ENCOUNTER
Post Kidney and Pancreas Transplant Team Conference  Date: 4/29/2020  Transplant Coordinator: Laurence Vazquez     Attendees:  [x]  Dr. Hill  [x] Leonela Brewster LPN     [x]  Dr. Wilson [x] Cinthia Cox, ERMELINDA [] Kiersten Chang LPN   [x]  Dr. Rios [] Gely Lynn, RN     [] Keeley Li RN [] Anthony Hendricks, Demetrio   [x] Dr. Johnson [x] Melani Vazquez RN    [] Dr. Durbin [] Virgilio Ryan RN    [x] Dr. Pinto [] Ophelia Oneal, ERMELINDA    [] Dr. Delgadillo [] Yesika Wong, RN     [] Melanie Ayala, ERMELINDA    [] Surgery Fellow [x] Nancy Maldonado RN    [] Johana Linda NP [] Nathalia Moreno RN        Verbal Plan Read Back:   No changes at this time    Routed to RN Coordinator   Leonela Brewster LPN

## 2020-04-30 ENCOUNTER — TELEPHONE (OUTPATIENT)
Dept: TRANSPLANT | Facility: CLINIC | Age: 30
End: 2020-04-30

## 2020-04-30 DIAGNOSIS — Z94.0 KIDNEY REPLACED BY TRANSPLANT: ICD-10-CM

## 2020-04-30 DIAGNOSIS — D84.9 IMMUNOSUPPRESSED STATUS (H): Primary | ICD-10-CM

## 2020-04-30 LAB
TACROLIMUS BLD-MCNC: 6.5 UG/L (ref 5–15)
TME LAST DOSE: NORMAL H

## 2020-04-30 NOTE — LETTER
PHYSICIAN ORDERS      DATE & TIME ISSUED: 2020, 11:50 AM   PATIENT NAME: Mona Wagner   : 1990     [facility] MR# [if applicable]: [id number]     DIAGNOSIS:  Kidney Transplant  ICD-10 CODE: Z94.0     Please complete the following lab on or around 2020:  BMP  CBC with platelets  T cell subset (CD4 count)  Tacrolimus drug level    Any questions please call: [phone]  Please fax lab results to [phone].    .

## 2020-04-30 NOTE — TELEPHONE ENCOUNTER
Acute Transplant Nephrology Clinic Visit with Care Coordinator    Overdue for labs: No   DUE NOW: CD4 (unable to add-on from draw 4/30/2020)   Patient informed of need for draw: Yes  Current lab schedule: monthly   Patient voiced understanding: yes    Medication compliant: yes  Medication refill questions / concerns: Mona denies at this time.    PJP prophylaxis up to date: Pentamidine Mona reports last dose was 4/17/2020, May appointment TBD after Cd4 is drawn / resulted.      Follow up for coordinator:  1. Orders for repeat labs with CD4 entered.  2. Mona continues to have low BPs (typically 90s / 50s, HR 80s), on florinef 0.1 2x weekly. She is drinking 2-3L of fluids daily.        Laurence Vazquez RN

## 2020-05-01 LAB
BKV DNA # SPEC NAA+PROBE: NORMAL COPIES/ML
BKV DNA SPEC NAA+PROBE-LOG#: NORMAL LOG COPIES/ML
LAB SCANNED RESULT: NORMAL
SPECIMEN SOURCE: NORMAL

## 2020-05-01 RX ORDER — DIPHENHYDRAMINE HCL 25 MG
CAPSULE ORAL
Qty: 100 CAPSULE | Refills: 1 | Status: SHIPPED | OUTPATIENT
Start: 2020-05-01 | End: 2021-03-12

## 2020-05-04 ENCOUNTER — VIRTUAL VISIT (OUTPATIENT)
Dept: NEPHROLOGY | Facility: CLINIC | Age: 30
End: 2020-05-04
Attending: INTERNAL MEDICINE
Payer: MEDICARE

## 2020-05-04 DIAGNOSIS — Z94.0 KIDNEY REPLACED BY TRANSPLANT: ICD-10-CM

## 2020-05-04 DIAGNOSIS — E55.9 VITAMIN D DEFICIENCY: ICD-10-CM

## 2020-05-04 DIAGNOSIS — E83.42 HYPOMAGNESEMIA: ICD-10-CM

## 2020-05-04 DIAGNOSIS — N25.81 SECONDARY RENAL HYPERPARATHYROIDISM (H): ICD-10-CM

## 2020-05-04 DIAGNOSIS — Z48.298 AFTERCARE FOLLOWING ORGAN TRANSPLANT: ICD-10-CM

## 2020-05-04 DIAGNOSIS — D84.9 IMMUNOSUPPRESSION (H): ICD-10-CM

## 2020-05-04 DIAGNOSIS — I95.1 ORTHOSTATIC HYPOTENSION: Primary | ICD-10-CM

## 2020-05-04 PROBLEM — N18.30 ANEMIA OF CHRONIC RENAL FAILURE, STAGE 3 (MODERATE) (H): Status: RESOLVED | Noted: 2019-08-29 | Resolved: 2020-05-04

## 2020-05-04 PROBLEM — D63.1 ANEMIA OF CHRONIC RENAL FAILURE, STAGE 3 (MODERATE) (H): Status: RESOLVED | Noted: 2019-08-29 | Resolved: 2020-05-04

## 2020-05-04 RX ORDER — FLUDROCORTISONE ACETATE 0.1 MG/1
0.1 TABLET ORAL
Qty: 180 TABLET | Refills: 3 | COMMUNITY
Start: 2020-05-04 | End: 2020-12-06

## 2020-05-04 RX ORDER — FLUDROCORTISONE ACETATE 0.1 MG/1
0.1 TABLET ORAL
Qty: 180 TABLET | Refills: 3 | COMMUNITY
Start: 2020-05-04 | End: 2020-05-04

## 2020-05-04 ASSESSMENT — PAIN SCALES - GENERAL: PAINLEVEL: NO PAIN (0)

## 2020-05-04 NOTE — PROGRESS NOTES
"Mona Wagner is a 29 year old female who is being evaluated via a billable video visit.      The patient has been notified of following:     \"This video visit will be conducted via a call between you and your physician/provider. We have found that certain health care needs can be provided without the need for an in-person physical exam.  This service lets us provide the care you need with a video conversation.  If a prescription is necessary we can send it directly to your pharmacy.  If lab work is needed we can place an order for that and you can then stop by our lab to have the test done at a later time.    Video visits are billed at different rates depending on your insurance coverage.  Please reach out to your insurance provider with any questions.    If during the course of the call the physician/provider feels a video visit is not appropriate, you will not be charged for this service.\"    Patient has given verbal consent for Video visit? Yes    How would you like to obtain your AVS? MyChart    Patient would like the video invitation sent by: [email or web address]    Will anyone else be joining your video visit? No    Video-Visit Details    Type of service:  Video Visit    Video Start Time: 1002  Video End Time: 1021    Originating Location (pt. Location): Home    Distant Location (provider location):  [facility] NEPHROLOGY     Platform used for Video Visit: Prince Hill MD        "

## 2020-05-04 NOTE — PROGRESS NOTES
ACUTE TRANSPLANT NEPHROLOGY VISIT    Assessment & Plan   # DDKT: Stable kidney function lately.  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  Had ureteral stent for a period of time, now removed.   - Baseline Cr ~ 1.1-1.3   - Proteinuria: Normal (<0.2 grams)   - Date DSA Last Checked: Mar/2020      Latest DSA: No   - BK Viremia: No   - Kidney Tx Biopsy: Sep 17, 2019; Result: No diagnostic evidence of acute rejection.      # Immunosuppression: Tacrolimus immediate release (goal 6-8) and Mycophenolic acid (goal not followed)   - Changes: No    # Infection Prophylaxis:   - PJP: Inhaled pentamidine; Will check CD4 level and if over 200, patient can stop inhaled pentamidine.    # Orthostatic Hypotension: Controlled, but low at times on Florinef;  Goal BP: > 100, but < 130 systolic   - Changes: Yes - Will slightly increase Florinef to 0.1 mg 3x per week (MWF).  If SBP is still below 110, would increase Florinef frequency further.    # Anemia in Chronic Renal Disease: Hgb: Trend up, now normal      CHARLINE: No   - Iron studies: Replete    # Mineral Bone Disorder:   - Secondary renal hyperparathyroidism; PTH level: Mildly elevated (151-300 pg/ml)        On treatment: Cinacalcet  - Vitamin D; level: Low        On Supplement: No; On no treatment due to hypercalcemia  - Calcium; level: Normal        On Supplement: No    # Electrolytes:   - Potassium; level: Normal        On Supplement: No  - Magnesium; level: Normal        On Supplement: Yes  - Bicarbonate; level: Normal        On Supplement: No    # Hyperprolactinemia: Normal prolactin level with last check.  Felt secondary to hypothyroidism versus kidney failure, or less likely now a pituitary microadenoma.  Followed by Endocrinology.    # Alopecia: Patient had symptoms early post transplant, which resolved, but now have returned some.  Likely due to medications, specifically tacrolimus.  Will follow for now, but if symptoms persist after  a year, would consider changing to extended release tacrolimus versus cyclosporine.     # Medical Compliance: Yes     # COVID-19 Virus Review: Discussed COVID-19 virus and the potential medical risks.  Reviewed preventative health recommendations, which includes washing hands for 20 seconds, avoid touching your face, and social distancing.  Asked patient to inform the transplant center if they are exposed or diagnosed with this virus.    # Transplant History:  Etiology of Kidney Failure: Unknown etiology (no kidney biopsy)  Tx: DDKT  Transplant: 8/3/2019 (Kidney)  Donor Type: Donation after Circulatory Death  Donor Class: Standard Criteria Donor  Crossmatch at time of Tx: negative  DSA at time of Tx: No  Significant changes in immunosuppression: None  CMV IgG Ab High Risk Discordance (D+/R-): No  EBV IgG Ab High Risk Discordance (D+/R-): No  Significant transplant-related complications: None    Transplant Office Phone Number: [phone]    Assessment and plan was discussed with the patient and she voiced her understanding and agreement.    Return visit: Return in about 3 months (around 8/4/2020).    Nakul Hill MD    Chief Complaint   Ms. Wagner is a 29 year old here for kidney transplant and immunosuppression management.     History of Present Illness    Ms. Wagner reports feeling good overall with some medical complaints.  Since last clinic visit, patient reports no hospitalizations or new medical complaints and has been doing well overall.  Her energy level is better, but not quite normal as she can still feel tired at times.  She is active and gets some exercise on a treadmill.  Denies any chest pain or shortness of breath with exertion.  Appetite is good and weight is up a few pounds.  No nausea or vomiting.  Some loose stools over the last week.  Patient did start taking Metamucil, which has helped and made them more formed early in the day, but still a little loose later in the day.  No bloody or  black, tarry stools.  No fever, sweats or chills.  No leg swelling.  She has noticed some increase in hair loss recently.  Patient said she had some of this early after transplant, but then is stopped and her hair started to come back in, but lately has been shedding a bit.    Recent Hospitalizations:  [x] No [] Yes    New Medical Issues: [] No [x] Yes See above   Decreased energy: [] No [x] Yes Improved   Chest pain or SOB with exertion:  [x] No [] Yes    Appetite change or weight change: [x] No [] Yes    Nausea, vomiting or diarrhea:  [] No [x] Yes Some loose stools   Fever, sweats or chills: [x] No [] Yes    Leg swelling: [x] No [] Yes      Home BP: 100/70s.  No lightheadedness.    Review of Systems   A comprehensive review of systems was obtained and negative, except as noted in the HPI or PMH.    Problem List   Patient Active Problem List   Diagnosis     DVT of upper extremity (deep vein thrombosis) (H)     Seizure disorder (H)     Galactorrhea     Acquired hypothyroidism     Increased prolactin level (H)     Hypomagnesemia     Infective endocarditis-history of.  1/2019.  resolved.      Aftercare following organ transplant     Immunosuppression (H)     Methemoglobinemia     Kidney replaced by transplant     Anxiety     Secondary renal hyperparathyroidism (H)     Vitamin D deficiency     CKD (chronic kidney disease) stage 3, GFR 30-59 ml/min (H)     Stricture or kinking of ureter     Pyelonephritis     Diarrhea     Bicornate uterus     Normal Pap 3/2020.  repeat 3/2023       Social History   Social History     Tobacco Use     Smoking status: Never Smoker     Smokeless tobacco: Never Used   Substance Use Topics     Alcohol use: No     Alcohol/week: 0.0 standard drinks     Drug use: No       Allergies   Allergies   Allergen Reactions     Contrast Dye Rash     CT contrast allergy 12/14/19 rash over eyes. Need to have pre medication before a CT WITH CONTRAST      Chlorhexidine Rash     Rash at site     Sulfa Drugs  Rash     Muscle stiffness of neck     Dapsone      Methemoglobinemia     Furosemide Other (See Comments)     Skin flushing     Lisinopril Swelling     angioedema       Medications   Current Outpatient Medications   Medication Sig     fludrocortisone (FLORINEF) 0.1 MG tablet Take 1 tablet (0.1 mg) by mouth Every Mon, Wed, Fri Morning     acetaminophen (TYLENOL) 325 MG tablet Take 2 tablets (650 mg) by mouth every 4 hours as needed for mild pain     aspirin (ASA) 81 MG chewable tablet CHEW AND SWALLOW 1 TABLET DAILY     atorvastatin (LIPITOR) 10 MG tablet Take 1 tablet (10 mg) by mouth daily     cinacalcet (SENSIPAR) 30 MG tablet TAKE 1 TABLET(30 MG) BY MOUTH DAILY     diphenhydrAMINE (BENADRYL) 25 MG capsule Take 1-2 tablets by mouth every 6 hours PRN itching/allergies     EPINEPHrine (EPIPEN/ADRENACLICK/OR ANY BX GENERIC EQUIV) 0.3 MG/0.3ML injection 2-pack 0.3 mg     hydrOXYzine (ATARAX) 10 MG tablet Take 1 tablet (10 mg) by mouth 3 times daily as needed for anxiety     lactobacillus rhamnosus, GG, (CULTURELL) capsule Take 1 capsule by mouth daily     levothyroxine (SYNTHROID/LEVOTHROID) 25 MCG tablet Take 1 tablet (25 mcg) Tuesday, Thursday, Saturday and Sunday and 1.5 tablets (37.5 mcg) on Monday, Wednesday and Friday every week.     magnesium oxide (MAG-OX) 400 MG tablet Take 1 tablet (400 mg) by mouth daily     MYFORTIC (BRAND) 180 MG EC tablet Take 3 tablets (540 mg) by mouth 2 times daily     norethindrone (MICRONOR) 0.35 MG tablet Do not restart until your follow up appointment with your surgeon. Discuss restart date at that appointment.     pentamidine (NEBUPENT) 300 MG neb solution Inhale 300 mg into the lungs every 28 days     psyllium (METAMUCIL/KONSYL) capsule Take 1 capsule by mouth daily (Patient taking differently: Take 1 capsule by mouth daily as needed (loose stools) )     simethicone (MYLICON) 125 MG chewable tablet Take 1 tablet (125 mg) by mouth 4 times daily as needed for intestinal gas      sodium bicarbonate 650 MG tablet TAKE 3 TABLETS(1950 MG) BY MOUTH TWICE DAILY     tacrolimus (GENERIC EQUIVALENT) 0.5 MG capsule Take 1 capsule (0.5 mg) by mouth 2 times daily Total dose = 4.5 mg BID     tacrolimus (GENERIC EQUIVALENT) 1 MG capsule Take 4 capsules (4 mg) by mouth 2 times daily Total dose = 4.5. mg BID     No current facility-administered medications for this visit.      Medications Discontinued During This Encounter   Medication Reason     fludrocortisone (FLORINEF) 0.1 MG tablet      fludrocortisone (FLORINEF) 0.1 MG tablet          Data     Renal Latest Ref Rng & Units 4/29/2020 4/6/2020 3/5/2020   Na 133 - 144 mmol/L 137 139 137   Na (external) 136 - 145 mmol/L - - -   K 3.4 - 5.3 mmol/L 4.7 4.3 4.9   K (external) 3.5 - 5.0 mmol/L - - -   Cl 94 - 109 mmol/L 108 111(H) 109   Cl (external) 98 - 107 mmol/L - - -   CO2 20 - 32 mmol/L 23 21 23   CO2 (external) 22 - 31 mmol/L - - -   BUN 7 - 30 mg/dL 22 25 31(H)   BUN (external) 8 - 22 mg/dL - - -   Cr 0.52 - 1.04 mg/dL 1.08(H) 1.07(H) 1.12(H)   Cr (external) 0.60 - 1 mg/dL - - -   Glucose 70 - 99 mg/dL 92 94 93   Glucose (external) 70 - 125 mg/dL - - -   Ca  8.5 - 10.1 mg/dL 9.3 9.8 9.7   Ca (external) 8.5 - 10.5 mg/dL - - -   Mg 1.6 - 2.3 mg/dL 1.8 - -   Mg (external) 1.8 - 2.6 mg/dL - - -     Bone Health Latest Ref Rng & Units 4/29/2020 3/2/2020 2/6/2020   Phos 2.5 - 4.5 mg/dL 2.7 2.6 2.7   Phos (external) 2.5 - 4.5 mg/dL - - -   PTHi 18 - 80 pg/mL - - -   PTHi (external) 18 - 80 pg/mL - - -   Vit D Def 20 - 75 ug/L - - -     Heme Latest Ref Rng & Units 4/29/2020 4/6/2020 3/5/2020   WBC 4.0 - 11.0 10e9/L 6.1 7.5 5.2   WBC (external) 4.0 - 11.0 thou/uL - - -   Hgb 11.7 - 15.7 g/dL 12.2 11.7 11.5(L)   Hgb (external) 12.0 - 16.0 g/dL - - -   Plt 150 - 450 10e9/L 225 225 227   Plt (external) 140 - 440 thou/uL - - -     Liver Latest Ref Rng & Units 2/3/2020 1/23/2020 8/30/2019   AP 40 - 150 U/L 126 210(H) 167(H)   AP (external) 34 - 104 U/L - - -   TBili  0.2 - 1.3 mg/dL 0.6 1.2 0.3   DBili 0.0 - 0.2 mg/dL 0.1 - -   ALT 0 - 50 U/L 20 20 16   ALT (external) 7 - 52 U/L - - -   AST 0 - 45 U/L 13 9 <3   AST (external) 13 - 39 U/L - - -   Tot Protein 6.8 - 8.8 g/dL 7.4 8.1 7.2   Tot Protein (external) 6.4 - 8.9 g/dL - - -   Albumin 3.4 - 5.0 g/dL 3.7 4.5 3.9   Albumin (external) - - - -     Pancreas Latest Ref Rng & Units 2/3/2020 8/3/2019 8/2/2019   A1C 0 - 5.6 % 5.7(H) 4.8 4.8     Iron studies Latest Ref Rng & Units 2/17/2020 12/3/2019 9/3/2019   Iron 35 - 180 ug/dL 95 47 87   Iron sat 15 - 46 % 42 23 34   Ferritin 12 - 150 ng/mL 1,139(H) 1,003(H) 1,535(H)   Ferritin (external) 10 - 291 ng/mL - - -     [facility] Txp Virology Latest Ref Rng & Units 4/29/2020 4/6/2020 3/2/2020   CVM DNA Quant - - - -   BK Spec - Plasma Plasma Plasma   BK Res BKNEG:BK Virus DNA Not Detected copies/mL BK Virus DNA Not Detected BK Virus DNA Not Detected BK Virus DNA Not Detected   BK Log <2.7 Log copies/mL Not Calculated Not Calculated Not Calculated   Hep B Surf - - - -        Recent Labs   Lab Test 03/05/20  0850 04/06/20  0837 04/29/20  0850   DOSTAC [id number]  [id number] 0830 4/28/20 2040   TACROL 7.0 6.4 6.5     Recent Labs   Lab Test 08/16/19  0807 09/03/19  0944   DOSMPA Not Provided 0840pm   MPACID 1.92 3.39   MPAG 149.2* 52.8

## 2020-05-04 NOTE — LETTER
5/4/2020      RE: Mona Wagner  [address]  [place] [address]       ACUTE TRANSPLANT NEPHROLOGY VISIT    Assessment & Plan   # DDKT: Stable kidney function lately.  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  Had ureteral stent for a period of time, now removed.   - Baseline Cr ~ 1.1-1.3   - Proteinuria: Normal (<0.2 grams)   - Date DSA Last Checked: Mar/2020      Latest DSA: No   - BK Viremia: No   - Kidney Tx Biopsy: Sep 17, 2019; Result: No diagnostic evidence of acute rejection.      # Immunosuppression: Tacrolimus immediate release (goal 6-8) and Mycophenolic acid (goal not followed)   - Changes: No    # Infection Prophylaxis:   - PJP: Inhaled pentamidine; Will check CD4 level and if over 200, patient can stop inhaled pentamidine.    # Orthostatic Hypotension: Controlled, but low at times on Florinef;  Goal BP: > 100, but < 130 systolic   - Changes: Yes - Will slightly increase Florinef to 0.1 mg 3x per week (MWF).  If SBP is still below 110, would increase Florinef frequency further.    # Anemia in Chronic Renal Disease: Hgb: Trend up, now normal      CHARLINE: No   - Iron studies: Replete    # Mineral Bone Disorder:   - Secondary renal hyperparathyroidism; PTH level: Mildly elevated (151-300 pg/ml)        On treatment: Cinacalcet  - Vitamin D; level: Low        On Supplement: No; On no treatment due to hypercalcemia  - Calcium; level: Normal        On Supplement: No    # Electrolytes:   - Potassium; level: Normal        On Supplement: No  - Magnesium; level: Normal        On Supplement: Yes  - Bicarbonate; level: Normal        On Supplement: No    # Hyperprolactinemia: Normal prolactin level with last check.  Felt secondary to hypothyroidism versus kidney failure, or less likely now a pituitary microadenoma.  Followed by Endocrinology.    # Alopecia: Patient had symptoms early post transplant, which resolved, but now have returned some.  Likely due to  medications, specifically tacrolimus.  Will follow for now, but if symptoms persist after a year, would consider changing to extended release tacrolimus versus cyclosporine.     # Medical Compliance: Yes     # COVID-19 Virus Review: Discussed COVID-19 virus and the potential medical risks.  Reviewed preventative health recommendations, which includes washing hands for 20 seconds, avoid touching your face, and social distancing.  Asked patient to inform the transplant center if they are exposed or diagnosed with this virus.    # Transplant History:  Etiology of Kidney Failure: Unknown etiology (no kidney biopsy)  Tx: DDKT  Transplant: 8/3/2019 (Kidney)  Donor Type: Donation after Circulatory Death  Donor Class: Standard Criteria Donor  Crossmatch at time of Tx: negative  DSA at time of Tx: No  Significant changes in immunosuppression: None  CMV IgG Ab High Risk Discordance (D+/R-): No  EBV IgG Ab High Risk Discordance (D+/R-): No  Significant transplant-related complications: None    Transplant Office Phone Number: [phone]    Assessment and plan was discussed with the patient and she voiced her understanding and agreement.    Return visit: Return in about 3 months (around 8/4/2020).    Nakul Hill MD    Chief Complaint   Ms. Wagner is a 29 year old here for kidney transplant and immunosuppression management.     History of Present Illness    Ms. Wagner reports feeling good overall with some medical complaints.  Since last clinic visit, patient reports no hospitalizations or new medical complaints and has been doing well overall.  Her energy level is better, but not quite normal as she can still feel tired at times.  She is active and gets some exercise on a treadmill.  Denies any chest pain or shortness of breath with exertion.  Appetite is good and weight is up a few pounds.  No nausea or vomiting.  Some loose stools over the last week.  Patient did start taking Metamucil, which has helped and made them  more formed early in the day, but still a little loose later in the day.  No bloody or black, tarry stools.  No fever, sweats or chills.  No leg swelling.  She has noticed some increase in hair loss recently.  Patient said she had some of this early after transplant, but then is stopped and her hair started to come back in, but lately has been shedding a bit.    Recent Hospitalizations:  [x] No [] Yes    New Medical Issues: [] No [x] Yes See above   Decreased energy: [] No [x] Yes Improved   Chest pain or SOB with exertion:  [x] No [] Yes    Appetite change or weight change: [x] No [] Yes    Nausea, vomiting or diarrhea:  [] No [x] Yes Some loose stools   Fever, sweats or chills: [x] No [] Yes    Leg swelling: [x] No [] Yes      Home BP: 100/70s.  No lightheadedness.    Review of Systems   A comprehensive review of systems was obtained and negative, except as noted in the HPI or PMH.    Problem List   Patient Active Problem List   Diagnosis     DVT of upper extremity (deep vein thrombosis) (H)     Seizure disorder (H)     Galactorrhea     Acquired hypothyroidism     Increased prolactin level (H)     Hypomagnesemia     Infective endocarditis-history of.  1/2019.  resolved.      Aftercare following organ transplant     Immunosuppression (H)     Methemoglobinemia     Kidney replaced by transplant     Anxiety     Secondary renal hyperparathyroidism (H)     Vitamin D deficiency     CKD (chronic kidney disease) stage 3, GFR 30-59 ml/min (H)     Stricture or kinking of ureter     Pyelonephritis     Diarrhea     Bicornate uterus     Normal Pap 3/2020.  repeat 3/2023       Social History   Social History     Tobacco Use     Smoking status: Never Smoker     Smokeless tobacco: Never Used   Substance Use Topics     Alcohol use: No     Alcohol/week: 0.0 standard drinks     Drug use: No       Allergies   Allergies   Allergen Reactions     Contrast Dye Rash     CT contrast allergy 12/14/19 rash over eyes. Need to have pre  medication before a CT WITH CONTRAST      Chlorhexidine Rash     Rash at site     Sulfa Drugs Rash     Muscle stiffness of neck     Dapsone      Methemoglobinemia     Furosemide Other (See Comments)     Skin flushing     Lisinopril Swelling     angioedema       Medications   Current Outpatient Medications   Medication Sig     fludrocortisone (FLORINEF) 0.1 MG tablet Take 1 tablet (0.1 mg) by mouth Every Mon, Wed, Fri Morning     acetaminophen (TYLENOL) 325 MG tablet Take 2 tablets (650 mg) by mouth every 4 hours as needed for mild pain     aspirin (ASA) 81 MG chewable tablet CHEW AND SWALLOW 1 TABLET DAILY     atorvastatin (LIPITOR) 10 MG tablet Take 1 tablet (10 mg) by mouth daily     cinacalcet (SENSIPAR) 30 MG tablet TAKE 1 TABLET(30 MG) BY MOUTH DAILY     diphenhydrAMINE (BENADRYL) 25 MG capsule Take 1-2 tablets by mouth every 6 hours PRN itching/allergies     EPINEPHrine (EPIPEN/ADRENACLICK/OR ANY BX GENERIC EQUIV) 0.3 MG/0.3ML injection 2-pack 0.3 mg     hydrOXYzine (ATARAX) 10 MG tablet Take 1 tablet (10 mg) by mouth 3 times daily as needed for anxiety     lactobacillus rhamnosus, GG, (CULTURELL) capsule Take 1 capsule by mouth daily     levothyroxine (SYNTHROID/LEVOTHROID) 25 MCG tablet Take 1 tablet (25 mcg) Tuesday, Thursday, Saturday and Sunday and 1.5 tablets (37.5 mcg) on Monday, Wednesday and Friday every week.     magnesium oxide (MAG-OX) 400 MG tablet Take 1 tablet (400 mg) by mouth daily     MYFORTIC (BRAND) 180 MG EC tablet Take 3 tablets (540 mg) by mouth 2 times daily     norethindrone (MICRONOR) 0.35 MG tablet Do not restart until your follow up appointment with your surgeon. Discuss restart date at that appointment.     pentamidine (NEBUPENT) 300 MG neb solution Inhale 300 mg into the lungs every 28 days     psyllium (METAMUCIL/KONSYL) capsule Take 1 capsule by mouth daily (Patient taking differently: Take 1 capsule by mouth daily as needed (loose stools) )     simethicone (MYLICON) 125 MG  chewable tablet Take 1 tablet (125 mg) by mouth 4 times daily as needed for intestinal gas     sodium bicarbonate 650 MG tablet TAKE 3 TABLETS(1950 MG) BY MOUTH TWICE DAILY     tacrolimus (GENERIC EQUIVALENT) 0.5 MG capsule Take 1 capsule (0.5 mg) by mouth 2 times daily Total dose = 4.5 mg BID     tacrolimus (GENERIC EQUIVALENT) 1 MG capsule Take 4 capsules (4 mg) by mouth 2 times daily Total dose = 4.5. mg BID     No current facility-administered medications for this visit.      Medications Discontinued During This Encounter   Medication Reason     fludrocortisone (FLORINEF) 0.1 MG tablet      fludrocortisone (FLORINEF) 0.1 MG tablet          Data     Renal Latest Ref Rng & Units 4/29/2020 4/6/2020 3/5/2020   Na 133 - 144 mmol/L 137 139 137   Na (external) 136 - 145 mmol/L - - -   K 3.4 - 5.3 mmol/L 4.7 4.3 4.9   K (external) 3.5 - 5.0 mmol/L - - -   Cl 94 - 109 mmol/L 108 111(H) 109   Cl (external) 98 - 107 mmol/L - - -   CO2 20 - 32 mmol/L 23 21 23   CO2 (external) 22 - 31 mmol/L - - -   BUN 7 - 30 mg/dL 22 25 31(H)   BUN (external) 8 - 22 mg/dL - - -   Cr 0.52 - 1.04 mg/dL 1.08(H) 1.07(H) 1.12(H)   Cr (external) 0.60 - 1 mg/dL - - -   Glucose 70 - 99 mg/dL 92 94 93   Glucose (external) 70 - 125 mg/dL - - -   Ca  8.5 - 10.1 mg/dL 9.3 9.8 9.7   Ca (external) 8.5 - 10.5 mg/dL - - -   Mg 1.6 - 2.3 mg/dL 1.8 - -   Mg (external) 1.8 - 2.6 mg/dL - - -     Bone Health Latest Ref Rng & Units 4/29/2020 3/2/2020 2/6/2020   Phos 2.5 - 4.5 mg/dL 2.7 2.6 2.7   Phos (external) 2.5 - 4.5 mg/dL - - -   PTHi 18 - 80 pg/mL - - -   PTHi (external) 18 - 80 pg/mL - - -   Vit D Def 20 - 75 ug/L - - -     Heme Latest Ref Rng & Units 4/29/2020 4/6/2020 3/5/2020   WBC 4.0 - 11.0 10e9/L 6.1 7.5 5.2   WBC (external) 4.0 - 11.0 thou/uL - - -   Hgb 11.7 - 15.7 g/dL 12.2 11.7 11.5(L)   Hgb (external) 12.0 - 16.0 g/dL - - -   Plt 150 - 450 10e9/L 225 225 227   Plt (external) 140 - 440 thou/uL - - -     Liver Latest Ref Rng & Units 2/3/2020  "1/23/2020 8/30/2019   AP 40 - 150 U/L 126 210(H) 167(H)   AP (external) 34 - 104 U/L - - -   TBili 0.2 - 1.3 mg/dL 0.6 1.2 0.3   DBili 0.0 - 0.2 mg/dL 0.1 - -   ALT 0 - 50 U/L 20 20 16   ALT (external) 7 - 52 U/L - - -   AST 0 - 45 U/L 13 9 <3   AST (external) 13 - 39 U/L - - -   Tot Protein 6.8 - 8.8 g/dL 7.4 8.1 7.2   Tot Protein (external) 6.4 - 8.9 g/dL - - -   Albumin 3.4 - 5.0 g/dL 3.7 4.5 3.9   Albumin (external) - - - -     Pancreas Latest Ref Rng & Units 2/3/2020 8/3/2019 8/2/2019   A1C 0 - 5.6 % 5.7(H) 4.8 4.8     Iron studies Latest Ref Rng & Units 2/17/2020 12/3/2019 9/3/2019   Iron 35 - 180 ug/dL 95 47 87   Iron sat 15 - 46 % 42 23 34   Ferritin 12 - 150 ng/mL 1,139(H) 1,003(H) 1,535(H)   Ferritin (external) 10 - 291 ng/mL - - -     [facility] Txp Virology Latest Ref Rng & Units 4/29/2020 4/6/2020 3/2/2020   CVM DNA Quant - - - -   BK Spec - Plasma Plasma Plasma   BK Res BKNEG:BK Virus DNA Not Detected copies/mL BK Virus DNA Not Detected BK Virus DNA Not Detected BK Virus DNA Not Detected   BK Log <2.7 Log copies/mL Not Calculated Not Calculated Not Calculated   Hep B Surf - - - -        Recent Labs   Lab Test 03/05/20  0850 04/06/20  0837 04/29/20  0850   DOSTAC [id number]  [id number] 0830 4/28/20 2040   TACROL 7.0 6.4 6.5     Recent Labs   Lab Test 08/16/19  0807 09/03/19  0944   DOSMPA Not Provided 0840pm   MPACID 1.92 3.39   MPAG 149.2* 52.8       Mona Wagner is a 29 year old female who is being evaluated via a billable video visit.      The patient has been notified of following:     \"This video visit will be conducted via a call between you and your physician/provider. We have found that certain health care needs can be provided without the need for an in-person physical exam.  This service lets us provide the care you need with a video conversation.  If a prescription is necessary we can send it directly to your pharmacy.  If lab work is needed we can place an order for that and you can then stop by our " "lab to have the test done at a later time.    Video visits are billed at different rates depending on your insurance coverage.  Please reach out to your insurance provider with any questions.    If during the course of the call the physician/provider feels a video visit is not appropriate, you will not be charged for this service.\"    Patient has given verbal consent for Video visit? Yes    How would you like to obtain your AVS? [place]    Patient would like the video invitation sent by: [email or web address]    Will anyone else be joining your video visit? No    Video-Visit Details    Type of service:  Video Visit    Video Start Time: 1002  Video End Time: 1021    Originating Location (pt. Location): Home    Distant Location (provider location):  [facility] NEPHROLOGY     Platform used for Video Visit: Prince Hill MD          "

## 2020-05-04 NOTE — PATIENT INSTRUCTIONS
Increase Florinef to 0.1 mg 3x per week (Monday, Wednesday and Friday).  If systolic blood pressure is still below 110, would consider increasing dosing frequency further.

## 2020-05-06 ENCOUNTER — TELEPHONE (OUTPATIENT)
Dept: PHARMACY | Facility: CLINIC | Age: 30
End: 2020-05-06

## 2020-05-06 ENCOUNTER — TELEPHONE (OUTPATIENT)
Dept: TRANSPLANT | Facility: CLINIC | Age: 30
End: 2020-05-06

## 2020-05-06 DIAGNOSIS — Z94.0 KIDNEY REPLACED BY TRANSPLANT: Primary | ICD-10-CM

## 2020-05-06 DIAGNOSIS — Z94.0 KIDNEY REPLACED BY TRANSPLANT: ICD-10-CM

## 2020-05-06 DIAGNOSIS — Z79.899 LONG TERM USE OF DRUG: ICD-10-CM

## 2020-05-06 LAB
ANION GAP SERPL CALCULATED.3IONS-SCNC: 9 MMOL/L (ref 3–14)
BUN SERPL-MCNC: 24 MG/DL (ref 7–30)
CALCIUM SERPL-MCNC: 9.4 MG/DL (ref 8.5–10.1)
CD3 CELLS # BLD: 453 CELLS/UL (ref 603–2990)
CD3 CELLS NFR BLD: 43 % (ref 49–84)
CD3+CD4+ CELLS # BLD: 189 CELLS/UL (ref 441–2156)
CD3+CD4+ CELLS NFR BLD: 18 % (ref 28–63)
CD3+CD4+ CELLS/CD3+CD8+ CLL BLD: 0.86 % (ref 1.4–2.6)
CD3+CD8+ CELLS # BLD: 221 CELLS/UL (ref 125–1312)
CD3+CD8+ CELLS NFR BLD: 21 % (ref 10–40)
CHLORIDE SERPL-SCNC: 111 MMOL/L (ref 94–109)
CO2 SERPL-SCNC: 20 MMOL/L (ref 20–32)
CREAT SERPL-MCNC: 1.05 MG/DL (ref 0.52–1.04)
ERYTHROCYTE [DISTWIDTH] IN BLOOD BY AUTOMATED COUNT: 12.5 % (ref 10–15)
GFR SERPL CREATININE-BSD FRML MDRD: 73 ML/MIN/{1.73_M2}
GLUCOSE SERPL-MCNC: 90 MG/DL (ref 70–99)
HCT VFR BLD AUTO: 38.8 % (ref 35–47)
HGB BLD-MCNC: 11.9 G/DL (ref 11.7–15.7)
IFC SPECIMEN: ABNORMAL
MCH RBC QN AUTO: 28.2 PG (ref 26.5–33)
MCHC RBC AUTO-ENTMCNC: 30.7 G/DL (ref 31.5–36.5)
MCV RBC AUTO: 92 FL (ref 78–100)
PLATELET # BLD AUTO: 234 10E9/L (ref 150–450)
POTASSIUM SERPL-SCNC: 4.3 MMOL/L (ref 3.4–5.3)
RBC # BLD AUTO: 4.22 10E12/L (ref 3.8–5.2)
SODIUM SERPL-SCNC: 140 MMOL/L (ref 133–144)
TACROLIMUS BLD-MCNC: 6.5 UG/L (ref 5–15)
TME LAST DOSE: NORMAL H
WBC # BLD AUTO: 6.8 10E9/L (ref 4–11)

## 2020-05-06 PROCEDURE — 80048 BASIC METABOLIC PNL TOTAL CA: CPT | Performed by: INTERNAL MEDICINE

## 2020-05-06 PROCEDURE — 86360 T CELL ABSOLUTE COUNT/RATIO: CPT | Performed by: INTERNAL MEDICINE

## 2020-05-06 PROCEDURE — 85027 COMPLETE CBC AUTOMATED: CPT | Performed by: INTERNAL MEDICINE

## 2020-05-06 PROCEDURE — 80197 ASSAY OF TACROLIMUS: CPT | Performed by: INTERNAL MEDICINE

## 2020-05-06 PROCEDURE — 86359 T CELLS TOTAL COUNT: CPT | Performed by: INTERNAL MEDICINE

## 2020-05-06 NOTE — LETTER
PHYSICIAN ORDERS      DATE & TIME ISSUED: May 6, 2020 2:41 PM  PATIENT NAME: Mona Wagner   : 1990     [facility] MR# [if applicable]: [id number]     DIAGNOSIS:  Kidney Transplant  ICD-10 CODE: Z94.0     Please complete the following lab during the month of 2020:  CD4 count (T cell subset)    Any questions please call: [phone]  Please fax lab results to [phone].    .

## 2020-05-06 NOTE — TELEPHONE ENCOUNTER
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    PLAN:  Continue monthly pentamidine through 1 year post transplant.  We will recheck the CD4 at that time (please enter orders for due date 8/3/2020).

## 2020-05-06 NOTE — TELEPHONE ENCOUNTER
Anemia Management Note  SUBJECTIVE/OBJECTIVE:  Referred by Dr. Nakul Hill on 2019  Primary Diagnosis: Anemia in Chronic Kidney Disease (N18.3, D63.1)     Secondary Diagnosis:  Chronic Kidney Disease, Stage 3 (N18.3)   Kidney Tx: 2019  Hgb goal range:  9-10  Epo/Darbo: Aranesp  40 mcg  every two weeks for Hgb <10.  In clinic.   Iron regimen:  NA  Labs : 2020  Recent CHARLINE use, transfusion, IV iron: PO Iron and Aranesp   RX/TX plans :2020  No history of stroke, MI and blood clots or cancers  Contact:            Ok to leave message  per consent to communicate dated 05/15/2019                              OK to speak with Jt Aleman () per consent to communicate dated 05/15/2013    Anemia Latest Ref Rng & Units 2020 2020 2020 3/2/2020 3/5/2020 2020 2020   CHARLINE Dose - - - - - - - -   Hemoglobin 11.7 - 15.7 g/dL 10.5(L) 10.5(L) 10.9(L) 11.4(L) 11.5(L) 11.7 12.2   TSAT 15 - 46 % - - - - - - -   Ferritin 12 - 150 ng/mL - - - - - - -     BP Readings from Last 3 Encounters:   20 111/73   20 107/71   20 124/77     Wt Readings from Last 2 Encounters:   20 62.8 kg (138 lb 6.4 oz)   20 62.6 kg (138 lb 0.1 oz)           ASSESSMENT:  Hgb:Above goal - recommend hold dose  TSat: Due for labs Ferritin: Due for labs    PLAN:  RTC for Hgb in 4 week(s) and Check iron studies in 4 week(s)    Orders needed to be renewed (for next follow-up date) in EPIC: None    Iron labs due:  Due    Plan discussed with:  No call, Lab review  Plan provided by:  solomon    NEXT FOLLOW-UP DATE:  6/3/2020    Manjula Mckinnon RN   Anemia Services  [address] [address]   [email or web address]   Office : [phone]  Fax: [phone]

## 2020-05-06 NOTE — TELEPHONE ENCOUNTER
Spoke to patient who verbalizes understanding to:  Continue monthly pentamidine through 1 year post transplant.  We will recheck the CD4 at that time (please enter orders for due date 8/3/2020).

## 2020-05-18 DIAGNOSIS — Z94.0 KIDNEY REPLACED BY TRANSPLANT: Primary | ICD-10-CM

## 2020-05-18 RX ORDER — ALBUTEROL SULFATE 0.83 MG/ML
2.5 SOLUTION RESPIRATORY (INHALATION) ONCE
Status: CANCELLED | OUTPATIENT
Start: 2020-06-12

## 2020-05-18 RX ORDER — ALBUTEROL SULFATE 0.83 MG/ML
2.5 SOLUTION RESPIRATORY (INHALATION) ONCE
Status: COMPLETED | OUTPATIENT
Start: 2020-05-18 | End: 2020-05-18

## 2020-05-18 RX ORDER — PENTAMIDINE ISETHIONATE 300 MG/300MG
300 INHALANT RESPIRATORY (INHALATION) ONCE
Status: CANCELLED | OUTPATIENT
Start: 2020-06-12

## 2020-05-18 RX ORDER — PENTAMIDINE ISETHIONATE 300 MG/300MG
300 INHALANT RESPIRATORY (INHALATION) ONCE
Status: COMPLETED | OUTPATIENT
Start: 2020-05-18 | End: 2020-05-18

## 2020-05-18 RX ADMIN — PENTAMIDINE ISETHIONATE 300 MG: 300 INHALANT RESPIRATORY (INHALATION) at 12:54

## 2020-05-18 RX ADMIN — ALBUTEROL SULFATE 2.5 MG: 0.83 SOLUTION RESPIRATORY (INHALATION) at 12:52

## 2020-05-18 NOTE — PROGRESS NOTES
Mona Wagner is receiving pentamidine neb treatment per Ondina Chandler  Patient first received 2.5 mg albuterol neb as additional treatment.  Patient then was given NebuPent 300 mg mixed with 6 ml sterile water.  Patient was able to tolerate & complete pentamidine neb treatment with a little shakiness noted.  Procedure performed by CURRY Tang

## 2020-05-21 ENCOUNTER — TELEPHONE (OUTPATIENT)
Dept: TRANSPLANT | Facility: CLINIC | Age: 30
End: 2020-05-21

## 2020-05-21 NOTE — TELEPHONE ENCOUNTER
Mona called with concern for UTI.  Period ended on the 17th, burning since the 19th, now frequency and urgency.    Will order UA / UC with full set tx labs at [facility].

## 2020-05-21 NOTE — LETTER
PHYSICIAN ORDERS      DATE & TIME ISSUED: 2020, 4:56 PM   PATIENT NAME: Mona Wagner   : 1990     [facility] MR# [if applicable]: [id number]     DIAGNOSIS:  Kidney Transplant  ICD-10 CODE: Z94.0     Please complete the following labs 2020:  BMP  CBC  Tacrolimus drug level  UA with reflex to culture    Any questions please call: [facility], Solid Organ Transplant                                             [phone]    Please fax each result, same-day available to:  [phone].     .

## 2020-05-21 NOTE — TELEPHONE ENCOUNTER
Patient Call: General  Route to LPN    Reason for call: pt feels like she has another UTI  Urgency and burning sensation     Call back needed? Yes    Return Call Needed  Same as documented in contacts section  When to return call?: Same day: Route High Priority

## 2020-05-22 ENCOUNTER — TELEPHONE (OUTPATIENT)
Dept: TRANSPLANT | Facility: CLINIC | Age: 30
End: 2020-05-22

## 2020-05-22 DIAGNOSIS — N39.0 URINARY TRACT INFECTION: Primary | ICD-10-CM

## 2020-05-22 DIAGNOSIS — Z79.899 LONG TERM USE OF DRUG: ICD-10-CM

## 2020-05-22 DIAGNOSIS — Z94.0 KIDNEY REPLACED BY TRANSPLANT: Primary | ICD-10-CM

## 2020-05-22 DIAGNOSIS — Z87.448 HISTORY OF PRIMARY IGA NEPHROPATHY: ICD-10-CM

## 2020-05-22 DIAGNOSIS — Z94.0 KIDNEY REPLACED BY TRANSPLANT: ICD-10-CM

## 2020-05-22 DIAGNOSIS — N18.30 CKD (CHRONIC KIDNEY DISEASE) STAGE 3, GFR 30-59 ML/MIN (H): Primary | ICD-10-CM

## 2020-05-22 LAB
ALBUMIN UR-MCNC: NEGATIVE MG/DL
ANION GAP SERPL CALCULATED.3IONS-SCNC: 4 MMOL/L (ref 3–14)
APPEARANCE UR: CLEAR
BACTERIA #/AREA URNS HPF: ABNORMAL /HPF
BILIRUB UR QL STRIP: NEGATIVE
BUN SERPL-MCNC: 22 MG/DL (ref 7–30)
CALCIUM SERPL-MCNC: 9.4 MG/DL (ref 8.5–10.1)
CHLORIDE SERPL-SCNC: 110 MMOL/L (ref 94–109)
CO2 SERPL-SCNC: 23 MMOL/L (ref 20–32)
COLOR UR AUTO: ABNORMAL
CREAT SERPL-MCNC: 1.03 MG/DL (ref 0.52–1.04)
ERYTHROCYTE [DISTWIDTH] IN BLOOD BY AUTOMATED COUNT: 12.5 % (ref 10–15)
GFR SERPL CREATININE-BSD FRML MDRD: 74 ML/MIN/{1.73_M2}
GLUCOSE SERPL-MCNC: 93 MG/DL (ref 70–99)
GLUCOSE UR STRIP-MCNC: NEGATIVE MG/DL
HCT VFR BLD AUTO: 37.9 % (ref 35–47)
HGB BLD-MCNC: 11.8 G/DL (ref 11.7–15.7)
HGB UR QL STRIP: NEGATIVE
KETONES UR STRIP-MCNC: NEGATIVE MG/DL
LEUKOCYTE ESTERASE UR QL STRIP: ABNORMAL
MCH RBC QN AUTO: 28.4 PG (ref 26.5–33)
MCHC RBC AUTO-ENTMCNC: 31.1 G/DL (ref 31.5–36.5)
MCV RBC AUTO: 91 FL (ref 78–100)
MUCOUS THREADS #/AREA URNS LPF: PRESENT /LPF
NITRATE UR QL: NEGATIVE
PH UR STRIP: 7 PH (ref 5–7)
PLATELET # BLD AUTO: 231 10E9/L (ref 150–450)
POTASSIUM SERPL-SCNC: 4.3 MMOL/L (ref 3.4–5.3)
RBC # BLD AUTO: 4.16 10E12/L (ref 3.8–5.2)
RBC #/AREA URNS AUTO: 1 /HPF (ref 0–2)
SODIUM SERPL-SCNC: 137 MMOL/L (ref 133–144)
SOURCE: ABNORMAL
SP GR UR STRIP: 1.01 (ref 1–1.03)
SQUAMOUS #/AREA URNS AUTO: 3 /HPF (ref 0–1)
TACROLIMUS BLD-MCNC: 6.6 UG/L (ref 5–15)
TME LAST DOSE: NORMAL H
UROBILINOGEN UR STRIP-MCNC: NORMAL MG/DL (ref 0–2)
WBC # BLD AUTO: 10.1 10E9/L (ref 4–11)
WBC #/AREA URNS AUTO: 2 /HPF (ref 0–5)

## 2020-05-22 PROCEDURE — 87186 SC STD MICRODIL/AGAR DIL: CPT | Performed by: INTERNAL MEDICINE

## 2020-05-22 PROCEDURE — 85027 COMPLETE CBC AUTOMATED: CPT | Performed by: INTERNAL MEDICINE

## 2020-05-22 PROCEDURE — 80048 BASIC METABOLIC PNL TOTAL CA: CPT | Performed by: INTERNAL MEDICINE

## 2020-05-22 PROCEDURE — 81001 URINALYSIS AUTO W/SCOPE: CPT | Performed by: INTERNAL MEDICINE

## 2020-05-22 PROCEDURE — 87088 URINE BACTERIA CULTURE: CPT | Performed by: INTERNAL MEDICINE

## 2020-05-22 PROCEDURE — 80197 ASSAY OF TACROLIMUS: CPT | Performed by: INTERNAL MEDICINE

## 2020-05-22 PROCEDURE — 87086 URINE CULTURE/COLONY COUNT: CPT | Performed by: INTERNAL MEDICINE

## 2020-05-22 RX ORDER — CEFPODOXIME PROXETIL 100 MG/1
100 TABLET, FILM COATED ORAL 2 TIMES DAILY
Qty: 20 TABLET | Refills: 0 | Status: SHIPPED | OUTPATIENT
Start: 2020-05-22 | End: 2020-05-22

## 2020-05-22 RX ORDER — CIPROFLOXACIN 250 MG/1
250 TABLET, FILM COATED ORAL EVERY 12 HOURS
Qty: 14 TABLET | Refills: 0 | Status: SHIPPED | OUTPATIENT
Start: 2020-05-22 | End: 2020-07-09

## 2020-05-22 NOTE — TELEPHONE ENCOUNTER
Pt called to report that she developed a rash on her abdomen and back an hour after taking vantin. She did feel a little short of breath and did take a dose of benadryl and feels better. Spoke with dr levy. We will stop abx and start cipro 250 mg every 12 hours for 7 days. Patient hesitant do use cipro due to the label stating can cause ruptured tendon and prefers not to use cipro. Discussed with dr levy and the incidents in rupture tear is rare and prefers to use cipro at this time until the culture is back. Updated patient and encouraged to call back with any changes or concerns.

## 2020-05-22 NOTE — TELEPHONE ENCOUNTER
Post Kidney and Pancreas Transplant Team Conference  Date: 5/22/2020  Transplant Coordinator: Laurence Vazquez     Attendees, via telephone:  [x]  Dr. Hill  [x] Leonela Brewster LPN     []  Dr. Wilson [] Cinthia Cox, ERMELINDA [] Kiersten Chang LPN   []  Dr. Rios [] Gely Lynn RN     [] Keeley Li RN [] Anthony Hendricks, PharmD   [] Dr. Johnson [x] Melani Vazquez RN    [] Dr. Durbin [] Virgilio Ryan RN    [] Dr. Pinto [] Ophelia Oneal RN    [] Dr. Delgadillo [] Yesika Wong RN     [] Melanie Ayala RN    [] Surgery Fellow [] Nancy Maldonado RN    [] Johana Linda NP [] Nathalia Moreno RN        Verbal Plan Read Back:   For symptomatic with bland UA, treat with cefpodixime 100mg BID for 10 days.    Routed to RN Coordinator   Laurence Vazquez RN    Mona voiced understanding and Rx sent to preferred pharmacy.    For fever and chills okay for tylenol 1,000mg Q8h.

## 2020-05-24 LAB
BACTERIA SPEC CULT: ABNORMAL
BACTERIA SPEC CULT: ABNORMAL
Lab: ABNORMAL
SPECIMEN SOURCE: ABNORMAL

## 2020-05-29 DIAGNOSIS — N39.0 URINARY TRACT INFECTION: Primary | ICD-10-CM

## 2020-06-01 DIAGNOSIS — Z79.899 LONG TERM USE OF DRUG: ICD-10-CM

## 2020-06-01 DIAGNOSIS — Z94.0 KIDNEY REPLACED BY TRANSPLANT: ICD-10-CM

## 2020-06-01 DIAGNOSIS — N39.0 URINARY TRACT INFECTION: ICD-10-CM

## 2020-06-01 DIAGNOSIS — Z94.0 KIDNEY REPLACED BY TRANSPLANT: Primary | ICD-10-CM

## 2020-06-01 LAB
ALBUMIN UR-MCNC: NEGATIVE MG/DL
ANION GAP SERPL CALCULATED.3IONS-SCNC: 10 MMOL/L (ref 3–14)
APPEARANCE UR: CLEAR
BACTERIA #/AREA URNS HPF: ABNORMAL /HPF
BILIRUB UR QL STRIP: NEGATIVE
BUN SERPL-MCNC: 20 MG/DL (ref 7–30)
CALCIUM SERPL-MCNC: 9.2 MG/DL (ref 8.5–10.1)
CHLORIDE SERPL-SCNC: 111 MMOL/L (ref 94–109)
CO2 SERPL-SCNC: 18 MMOL/L (ref 20–32)
COLOR UR AUTO: ABNORMAL
CREAT SERPL-MCNC: 1 MG/DL (ref 0.52–1.04)
ERYTHROCYTE [DISTWIDTH] IN BLOOD BY AUTOMATED COUNT: 12.6 % (ref 10–15)
GFR SERPL CREATININE-BSD FRML MDRD: 77 ML/MIN/{1.73_M2}
GLUCOSE SERPL-MCNC: 90 MG/DL (ref 70–99)
GLUCOSE UR STRIP-MCNC: NEGATIVE MG/DL
HCT VFR BLD AUTO: 36.4 % (ref 35–47)
HGB BLD-MCNC: 11.3 G/DL (ref 11.7–15.7)
HGB UR QL STRIP: ABNORMAL
KETONES UR STRIP-MCNC: NEGATIVE MG/DL
LEUKOCYTE ESTERASE UR QL STRIP: NEGATIVE
MCH RBC QN AUTO: 28.3 PG (ref 26.5–33)
MCHC RBC AUTO-ENTMCNC: 31 G/DL (ref 31.5–36.5)
MCV RBC AUTO: 91 FL (ref 78–100)
NITRATE UR QL: NEGATIVE
PH UR STRIP: 6.5 PH (ref 5–7)
PLATELET # BLD AUTO: 364 10E9/L (ref 150–450)
POTASSIUM SERPL-SCNC: 4.1 MMOL/L (ref 3.4–5.3)
RBC # BLD AUTO: 3.99 10E12/L (ref 3.8–5.2)
RBC #/AREA URNS AUTO: <1 /HPF (ref 0–2)
SODIUM SERPL-SCNC: 140 MMOL/L (ref 133–144)
SOURCE: ABNORMAL
SP GR UR STRIP: 1.01 (ref 1–1.03)
SQUAMOUS #/AREA URNS AUTO: <1 /HPF (ref 0–1)
TACROLIMUS BLD-MCNC: 6.9 UG/L (ref 5–15)
TME LAST DOSE: NORMAL H
UROBILINOGEN UR STRIP-MCNC: NORMAL MG/DL (ref 0–2)
WBC # BLD AUTO: 5.3 10E9/L (ref 4–11)
WBC #/AREA URNS AUTO: <1 /HPF (ref 0–5)

## 2020-06-01 PROCEDURE — 87799 DETECT AGENT NOS DNA QUANT: CPT | Performed by: INTERNAL MEDICINE

## 2020-06-01 PROCEDURE — 80197 ASSAY OF TACROLIMUS: CPT | Performed by: INTERNAL MEDICINE

## 2020-06-01 PROCEDURE — 85027 COMPLETE CBC AUTOMATED: CPT | Performed by: INTERNAL MEDICINE

## 2020-06-01 PROCEDURE — 80048 BASIC METABOLIC PNL TOTAL CA: CPT | Performed by: INTERNAL MEDICINE

## 2020-06-01 PROCEDURE — 81001 URINALYSIS AUTO W/SCOPE: CPT | Performed by: INTERNAL MEDICINE

## 2020-06-01 NOTE — PROGRESS NOTES
Orders for Dr. Hill obtained for [place] to .     SHAYY Prather ([facility] [address] [place] [address]  [phone]

## 2020-06-02 ENCOUNTER — TELEPHONE (OUTPATIENT)
Dept: PHARMACY | Facility: CLINIC | Age: 30
End: 2020-06-02

## 2020-06-02 ENCOUNTER — TELEPHONE (OUTPATIENT)
Dept: TRANSPLANT | Facility: CLINIC | Age: 30
End: 2020-06-02

## 2020-06-02 NOTE — TELEPHONE ENCOUNTER
Anemia Management Note  SUBJECTIVE/OBJECTIVE:  Referred by Dr. Nakul Hill on 2019  Primary Diagnosis: Anemia in Chronic Kidney Disease (N18.3, D63.1)     Secondary Diagnosis:  Chronic Kidney Disease, Stage 3 (N18.3)   Kidney Tx: 2019  Hgb goal range:  9-10  Epo/Darbo: Aranesp  40 mcg  every two weeks for Hgb <10.  In clinic.   Iron regimen:  NA  Labs : 2020  Recent CHARLINE use, transfusion, IV iron: PO Iron and Aranesp   RX/TX plans :2020  No history of stroke, MI and blood clots or cancers  Contact:            Ok to leave message  per consent to communicate dated 05/15/2019                              OK to speak with Jt Aleman () per consent to communicate dated 05/15/2013    Anemia Latest Ref Rng & Units 3/2/2020 3/5/2020 2020 2020 2020 2020 2020   CHARLINE Dose - - - - - - - -   Hemoglobin 11.7 - 15.7 g/dL 11.4(L) 11.5(L) 11.7 12.2 11.9 11.8 11.3(L)   TSAT 15 - 46 % - - - - - - -   Ferritin 12 - 150 ng/mL - - - - - - -     BP Readings from Last 3 Encounters:   20 111/73   20 107/71   20 124/77     Wt Readings from Last 2 Encounters:   20 62.8 kg (138 lb 6.4 oz)   20 62.6 kg (138 lb 0.1 oz)           ASSESSMENT:  Hgb:Above goal - recommend hold dose  TSat: Due for labs Ferritin: Due for labs    PLAN:  RTC for Hgb in 2 week(s) and Check iron studies in 2 week(s)    Orders needed to be renewed (for next follow-up date) in Letters and EPIC: None    Iron labs due:  DUE    Plan discussed with:  No call made. Lab review  Plan provided by:  solomon    NEXT FOLLOW-UP DATE:  2020    Manjula Mckinnon RN   Anemia Services  [address] [address]   [email or web address]   Office : [phone]  Fax: [phone]

## 2020-06-02 NOTE — TELEPHONE ENCOUNTER
ISSUE:  Bicarb running low.  Has appt with MFM.    PLAN:  Confirm no new diarrhea and that Mona is taking bicarb supplement as prescribed, 6 tabs daily.    Educate that she will need to meet with transplant nephrology MD prior to trying to conceive, as we will need to change her immunosupression    OUTCOME:  Mona was taking 3 tabs daily, rather than as prescribed, while on her abx for UTI. She could not time the medications appropriately to avoid drug interaction AND ensure she wasn't taking the meds on an empty stomach.    She has now started taking the bicarb as prescribed.      Mona voiced that she has cancelled her MFM appt, as she and her  decided that they are not yet ready to try to conceive.  RNCC advised to wait until 1 year post transplant and educated per above.  Mona voiced understanding.     Mona also reports UTI s/s are cleared.

## 2020-06-10 DIAGNOSIS — Z94.0 KIDNEY TRANSPLANTED: ICD-10-CM

## 2020-06-10 RX ORDER — CINACALCET 30 MG/1
30 TABLET, FILM COATED ORAL DAILY
Qty: 30 TABLET | Refills: 11 | Status: SHIPPED | OUTPATIENT
Start: 2020-06-10 | End: 2021-02-11

## 2020-06-19 DIAGNOSIS — Z94.0 KIDNEY REPLACED BY TRANSPLANT: Primary | ICD-10-CM

## 2020-06-19 RX ORDER — ALBUTEROL SULFATE 0.83 MG/ML
2.5 SOLUTION RESPIRATORY (INHALATION) ONCE
Status: COMPLETED | OUTPATIENT
Start: 2020-06-19 | End: 2020-06-19

## 2020-06-19 RX ORDER — PENTAMIDINE ISETHIONATE 300 MG/300MG
300 INHALANT RESPIRATORY (INHALATION) ONCE
Status: COMPLETED | OUTPATIENT
Start: 2020-06-19 | End: 2020-06-19

## 2020-06-19 RX ORDER — ALBUTEROL SULFATE 0.83 MG/ML
2.5 SOLUTION RESPIRATORY (INHALATION) ONCE
Status: CANCELLED | OUTPATIENT
Start: 2020-07-10

## 2020-06-19 RX ORDER — PENTAMIDINE ISETHIONATE 300 MG/300MG
300 INHALANT RESPIRATORY (INHALATION) ONCE
Status: CANCELLED | OUTPATIENT
Start: 2020-07-10

## 2020-06-19 RX ADMIN — ALBUTEROL SULFATE 2.5 MG: 0.83 SOLUTION RESPIRATORY (INHALATION) at 11:57

## 2020-06-19 RX ADMIN — PENTAMIDINE ISETHIONATE 300 MG: 300 INHALANT RESPIRATORY (INHALATION) at 12:04

## 2020-06-19 NOTE — PROGRESS NOTES
Pentamidine Treatment ordered by Dr. Deleon    Pre-treatment Breath Sounds:Clear  Pre-treatment Vital Signs: Sat 100%  HR 88    Pt given 2.5 mg Albuterol nebulized via hand held nebulizer followed by 300 mg Pentamidine in 6 mL sterile water via filtered nebulizer.     Post-treatment Breath Sounds: Clear  Post treatment Vital Signs:Sat 100%  HR 97    Pt tolerated the treatment well. Patient states that she gets shaky with Pentamidine. Pt reports no adverse reactions.  PERCY SIMMONS

## 2020-06-19 NOTE — TELEPHONE ENCOUNTER
Labs are scheduled for 7/6/20 at 8:30am.    Manjula Mckinnon RN   Anemia Services  [address] [address]   [email or web address]   Office : [phone]  Fax: [phone]

## 2020-06-29 ENCOUNTER — TELEPHONE (OUTPATIENT)
Dept: MATERNAL FETAL MEDICINE | Facility: CLINIC | Age: 30
End: 2020-06-29

## 2020-06-29 NOTE — TELEPHONE ENCOUNTER
Patient stated via phone that she is not ready for [facility] preconception consult and will reach out to her referring provider once she is ready again.    Referring clinic notified. Removing order.    Fela Kelly,    , [facility]

## 2020-07-06 NOTE — TELEPHONE ENCOUNTER
Lab scheduled for today was canceled and reschedule for 7/8/20.    Manjula Mckinnon RN   Anemia Services  [address] [address]   [email or web address]   Office : [phone]  Fax: [phone]

## 2020-07-08 ENCOUNTER — TELEPHONE (OUTPATIENT)
Dept: PHARMACY | Facility: CLINIC | Age: 30
End: 2020-07-08

## 2020-07-08 DIAGNOSIS — Z94.0 KIDNEY REPLACED BY TRANSPLANT: Primary | ICD-10-CM

## 2020-07-08 DIAGNOSIS — Z79.899 LONG TERM USE OF DRUG: ICD-10-CM

## 2020-07-08 DIAGNOSIS — Z94.0 KIDNEY REPLACED BY TRANSPLANT: ICD-10-CM

## 2020-07-08 LAB
ANION GAP SERPL CALCULATED.3IONS-SCNC: 8 MMOL/L (ref 3–14)
BUN SERPL-MCNC: 25 MG/DL (ref 7–30)
CALCIUM SERPL-MCNC: 9.7 MG/DL (ref 8.5–10.1)
CD3 CELLS # BLD: 461 CELLS/UL (ref 603–2990)
CD3 CELLS NFR BLD: 46 % (ref 49–84)
CD3+CD4+ CELLS # BLD: 201 CELLS/UL (ref 441–2156)
CD3+CD4+ CELLS NFR BLD: 20 % (ref 28–63)
CD3+CD4+ CELLS/CD3+CD8+ CLL BLD: 0.87 % (ref 1.4–2.6)
CD3+CD8+ CELLS # BLD: 227 CELLS/UL (ref 125–1312)
CD3+CD8+ CELLS NFR BLD: 23 % (ref 10–40)
CHLORIDE SERPL-SCNC: 111 MMOL/L (ref 94–109)
CO2 SERPL-SCNC: 19 MMOL/L (ref 20–32)
CREAT SERPL-MCNC: 1.18 MG/DL (ref 0.52–1.04)
ERYTHROCYTE [DISTWIDTH] IN BLOOD BY AUTOMATED COUNT: 13 % (ref 10–15)
GFR SERPL CREATININE-BSD FRML MDRD: 63 ML/MIN/{1.73_M2}
GLUCOSE SERPL-MCNC: 91 MG/DL (ref 70–99)
HCT VFR BLD AUTO: 36.5 % (ref 35–47)
HGB BLD-MCNC: 11.2 G/DL (ref 11.7–15.7)
IFC SPECIMEN: ABNORMAL
MCH RBC QN AUTO: 28 PG (ref 26.5–33)
MCHC RBC AUTO-ENTMCNC: 30.7 G/DL (ref 31.5–36.5)
MCV RBC AUTO: 91 FL (ref 78–100)
PLATELET # BLD AUTO: 230 10E9/L (ref 150–450)
POTASSIUM SERPL-SCNC: 4.4 MMOL/L (ref 3.4–5.3)
RBC # BLD AUTO: 4 10E12/L (ref 3.8–5.2)
SODIUM SERPL-SCNC: 138 MMOL/L (ref 133–144)
TACROLIMUS BLD-MCNC: 7 UG/L (ref 5–15)
TME LAST DOSE: NORMAL H
WBC # BLD AUTO: 5.6 10E9/L (ref 4–11)

## 2020-07-08 PROCEDURE — 80048 BASIC METABOLIC PNL TOTAL CA: CPT | Performed by: INTERNAL MEDICINE

## 2020-07-08 PROCEDURE — 86359 T CELLS TOTAL COUNT: CPT | Performed by: INTERNAL MEDICINE

## 2020-07-08 PROCEDURE — 86360 T CELL ABSOLUTE COUNT/RATIO: CPT | Performed by: INTERNAL MEDICINE

## 2020-07-08 PROCEDURE — 85027 COMPLETE CBC AUTOMATED: CPT | Performed by: INTERNAL MEDICINE

## 2020-07-08 PROCEDURE — 80197 ASSAY OF TACROLIMUS: CPT | Performed by: INTERNAL MEDICINE

## 2020-07-08 PROCEDURE — 87799 DETECT AGENT NOS DNA QUANT: CPT | Performed by: INTERNAL MEDICINE

## 2020-07-08 RX ORDER — ATORVASTATIN CALCIUM 10 MG/1
10 TABLET, FILM COATED ORAL DAILY
Qty: 90 TABLET | Refills: 3 | Status: SHIPPED | OUTPATIENT
Start: 2020-07-08 | End: 2020-10-07

## 2020-07-08 NOTE — TELEPHONE ENCOUNTER
Anemia Management Note  SUBJECTIVE/OBJECTIVE:  Referred by Dr. Nakul Hill on 2019  Primary Diagnosis: Anemia in Chronic Kidney Disease (N18.3, D63.1)     Secondary Diagnosis:  Chronic Kidney Disease, Stage 3 (N18.3)   Kidney Tx: 2019  Hgb goal range:  9-10  Epo/Darbo: Aranesp  40 mcg  every two weeks for Hgb <10.  In clinic.   Iron regimen:  NA  Labs : 2020  Recent CHARLINE use, transfusion, IV iron: PO Iron and Aranesp   RX/TX plans :2020  No history of stroke, MI and blood clots or cancers  Contact:            Ok to leave message  per consent to communicate dated 05/15/2019                              OK to speak with Jt Aleman () per consent to communicate dated 05/15/2013    Anemia Latest Ref Rng & Units 3/5/2020 2020 2020 2020 2020 2020 2020   CHARLINE Dose - - - - - - - -   Hemoglobin 11.7 - 15.7 g/dL 11.5(L) 11.7 12.2 11.9 11.8 11.3(L) 11.2(L)   TSAT 15 - 46 % - - - - - - -   Ferritin 12 - 150 ng/mL - - - - - - -     BP Readings from Last 3 Encounters:   20 111/73   20 107/71   20 124/77     Wt Readings from Last 2 Encounters:   20 62.8 kg (138 lb 6.4 oz)   20 62.6 kg (138 lb 0.1 oz)           ASSESSMENT:  Hgb:Above goal - recommend hold dose  TSat: Due for labs Ferritin: Due for labs    PLAN:  Hold Aranesp and RTC for hgb then aranesp if needed in 2-4 week(s)    Orders needed to be renewed (for next follow-up date) in EPIC: None    Iron labs due:  2-4    Plan discussed with:  NO call made. Lab review  Plan provided by:  solomon    NEXT FOLLOW-UP DATE:  Next appt scheduled 8/10/20 at 8:30am.      Manjula Mckinnon RN   Anemia Services  [address] [address]   [email or web address]   Office : [phone]  Fax: [phone]

## 2020-07-09 ENCOUNTER — TELEPHONE (OUTPATIENT)
Dept: TRANSPLANT | Facility: CLINIC | Age: 30
End: 2020-07-09

## 2020-07-09 DIAGNOSIS — Z79.2 PROPHYLACTIC ANTIBIOTIC: Primary | ICD-10-CM

## 2020-07-09 NOTE — TELEPHONE ENCOUNTER
CO2 running low.    CD4 >200, will stop pentamidine per below:  Nakul Hill MD Schindelholz, Alanna, RN               With CD4 just at 200, but trending up, would recommend one more dosing of inhaled pentamidine, then she can stop.         Orders entered for a single dose of pentamidine.   Mona voiced understanding to complete final dose of pentamidine on / around 7/13/2020.  She will call to schedule.

## 2020-07-10 ENCOUNTER — MYC MEDICAL ADVICE (OUTPATIENT)
Dept: NEPHROLOGY | Facility: CLINIC | Age: 30
End: 2020-07-10

## 2020-07-10 ENCOUNTER — TELEPHONE (OUTPATIENT)
Dept: TRANSPLANT | Facility: CLINIC | Age: 30
End: 2020-07-10

## 2020-07-10 PROBLEM — Z79.2 PROPHYLACTIC ANTIBIOTIC: Status: ACTIVE | Noted: 2020-07-10

## 2020-07-10 RX ORDER — ALBUTEROL SULFATE 0.83 MG/ML
2.5 SOLUTION RESPIRATORY (INHALATION) ONCE
Status: CANCELLED | OUTPATIENT
Start: 2020-07-10

## 2020-07-10 RX ORDER — PENTAMIDINE ISETHIONATE 300 MG/300MG
300 INHALANT RESPIRATORY (INHALATION) ONCE
Status: CANCELLED | OUTPATIENT
Start: 2020-07-10

## 2020-07-10 NOTE — TELEPHONE ENCOUNTER
Patient Call: Voicemail  Date/Time: 7/10/20 at 9:46 am  Reason for call: returning call to coordaintor

## 2020-07-14 ENCOUNTER — MYC MEDICAL ADVICE (OUTPATIENT)
Dept: FAMILY MEDICINE | Facility: CLINIC | Age: 30
End: 2020-07-14

## 2020-07-14 ENCOUNTER — TELEPHONE (OUTPATIENT)
Dept: FAMILY MEDICINE | Facility: CLINIC | Age: 30
End: 2020-07-14

## 2020-07-14 DIAGNOSIS — L50.9 HIVES: Primary | ICD-10-CM

## 2020-07-14 RX ORDER — EPINEPHRINE 0.3 MG/.3ML
0.3 INJECTION SUBCUTANEOUS PRN
Qty: 2 EACH | Refills: 1 | Status: SHIPPED | OUTPATIENT
Start: 2020-07-14 | End: 2021-12-07

## 2020-07-14 NOTE — TELEPHONE ENCOUNTER
Called patient today to check on symptoms.  Patient reports that hives improving.  No issues with breathing or swelling.  Will discontinue metronidazole gel.  Would like to try boric acid rather than clindamycin cream

## 2020-07-14 NOTE — TELEPHONE ENCOUNTER
July 14, 2020 9:52 AM -  AIVERSE1: called pt to [facility] neb 7/17 confirmed

## 2020-07-14 NOTE — TELEPHONE ENCOUNTER
Took vaginal metronidazole around 10 pm. Woke up with itching at 1 am. Noticed hives on both arms and legs, neck, back. She is having no other symptoms at this point in time. Denies any itching in her throat, any lightheadedness, dizziness, shortness of breath, wheezing, abdominal pain, nausea, vomiting. Given sole cutaneous manifestation, it is possible she is having a mild allergic reaction to the metronidazole. She thinks she would be able to take the remainder of the gel out of her vagina, so as to stop the exposure. She has tried benadryl 25 mg, and hives are about the same, so I instructed her to take another 25 mg. She does have an epipen at home, and we reviewed the above symptoms that I would be concerned about as signs for anaphylaxis. If any were to occur, we discussed using the epipen and coming straight to the ED, which she agrees to. She has her  with her, and he understands the plan as well. I will route note to patient's PCP. I instructed them to call the nursing line if any further questions or concerns arise tonight.    Walt Wilkes MD  [facility] Resident, PGY3

## 2020-07-17 DIAGNOSIS — Z48.298 AFTERCARE FOLLOWING ORGAN TRANSPLANT: ICD-10-CM

## 2020-07-17 DIAGNOSIS — Z79.2 PROPHYLACTIC ANTIBIOTIC: Primary | ICD-10-CM

## 2020-07-17 DIAGNOSIS — Z94.0 KIDNEY REPLACED BY TRANSPLANT: ICD-10-CM

## 2020-07-17 RX ORDER — PENTAMIDINE ISETHIONATE 300 MG/300MG
300 INHALANT RESPIRATORY (INHALATION) ONCE
Status: CANCELLED | OUTPATIENT
Start: 2020-08-14

## 2020-07-17 RX ORDER — PENTAMIDINE ISETHIONATE 300 MG/300MG
300 INHALANT RESPIRATORY (INHALATION) ONCE
Status: COMPLETED | OUTPATIENT
Start: 2020-07-17 | End: 2020-07-17

## 2020-07-17 RX ORDER — ALBUTEROL SULFATE 0.83 MG/ML
2.5 SOLUTION RESPIRATORY (INHALATION) ONCE
Status: CANCELLED | OUTPATIENT
Start: 2020-08-14

## 2020-07-17 RX ORDER — ALBUTEROL SULFATE 0.83 MG/ML
2.5 SOLUTION RESPIRATORY (INHALATION) ONCE
Status: COMPLETED | OUTPATIENT
Start: 2020-07-17 | End: 2020-07-17

## 2020-07-17 RX ADMIN — PENTAMIDINE ISETHIONATE 300 MG: 300 INHALANT RESPIRATORY (INHALATION) at 12:59

## 2020-07-17 RX ADMIN — ALBUTEROL SULFATE 2.5 MG: 0.83 SOLUTION RESPIRATORY (INHALATION) at 12:58

## 2020-07-17 NOTE — PROGRESS NOTES
Patient was seen today for a Pentamidine nebulizer tx ordered by Dr.Richard Hill.    Patient was first given 2.5 mg Albuterol nebulizer, after which Pentamidine 300 mg (Lot # [id number]) mixed with 6cc Sterile H2O was administered through a filtered nebulizer.    Patient notes some shakiness from treatment that has resolved within about 15 minutes after past treatments.  No other adverse side effects noted by the patient.    Pre-treatment: SpO2 = 100%     HR = 79     BBS = clear  Post-treatment: SpO2 = 100 %     HR = 97    BBS = clear    Procedure was completed today by IRIS Rachel

## 2020-07-26 ENCOUNTER — MEDICAL CORRESPONDENCE (OUTPATIENT)
Dept: HEALTH INFORMATION MANAGEMENT | Facility: CLINIC | Age: 30
End: 2020-07-26

## 2020-07-26 ENCOUNTER — TELEPHONE (OUTPATIENT)
Dept: TRANSPLANT | Facility: CLINIC | Age: 30
End: 2020-07-26

## 2020-07-26 NOTE — LETTER
[facility] [phone]    DATE: 2020                   PATIENT NAME: Mona Wagner   : 1990   [employer] MR# [if applicable]: [id number]     DIAGNOSIS: Kidney Transplant (ICD-10  Z94.0)  Long term use of medications (ICD-10  Z79.899)   Urinary urgency      Urine analysis  Urine Culture     .       Please fax these results to [phone].

## 2020-07-27 DIAGNOSIS — Z94.0 KIDNEY REPLACED BY TRANSPLANT: ICD-10-CM

## 2020-07-27 DIAGNOSIS — Z87.448 HISTORY OF PRIMARY IGA NEPHROPATHY: ICD-10-CM

## 2020-07-27 DIAGNOSIS — R39.15 URINARY URGENCY: Primary | ICD-10-CM

## 2020-07-27 LAB
ALBUMIN UR-MCNC: NEGATIVE MG/DL
APPEARANCE UR: CLEAR
BACTERIA #/AREA URNS HPF: ABNORMAL /HPF
BILIRUB UR QL STRIP: NEGATIVE
COLOR UR AUTO: ABNORMAL
GLUCOSE UR STRIP-MCNC: NEGATIVE MG/DL
HGB UR QL STRIP: NEGATIVE
KETONES UR STRIP-MCNC: NEGATIVE MG/DL
LEUKOCYTE ESTERASE UR QL STRIP: ABNORMAL
MUCOUS THREADS #/AREA URNS LPF: PRESENT /LPF
NITRATE UR QL: NEGATIVE
PH UR STRIP: 6.5 PH (ref 5–7)
RBC #/AREA URNS AUTO: <1 /HPF (ref 0–2)
SOURCE: ABNORMAL
SP GR UR STRIP: 1.01 (ref 1–1.03)
SQUAMOUS #/AREA URNS AUTO: 1 /HPF (ref 0–1)
UROBILINOGEN UR STRIP-MCNC: NORMAL MG/DL (ref 0–2)
WBC #/AREA URNS AUTO: 1 /HPF (ref 0–5)

## 2020-07-27 PROCEDURE — 81001 URINALYSIS AUTO W/SCOPE: CPT | Performed by: INTERNAL MEDICINE

## 2020-07-27 PROCEDURE — 87086 URINE CULTURE/COLONY COUNT: CPT | Performed by: INTERNAL MEDICINE

## 2020-07-27 PROCEDURE — 87088 URINE BACTERIA CULTURE: CPT | Performed by: INTERNAL MEDICINE

## 2020-07-27 NOTE — TELEPHONE ENCOUNTER
Patient called to report she thinks she has another UTI. She has urgency and burning. She has a dose of Cipro at home and wondering if she can take a dose tonight. Patient will go get UA UC on 7/27/2020. Discussed with DR Hill patient ok to take Cipro dose tonight and UA UC on 7/27. Patient prefers order to be faxed to [facility]. Order faxed.

## 2020-07-28 LAB
BACTERIA SPEC CULT: ABNORMAL
Lab: ABNORMAL
SPECIMEN SOURCE: ABNORMAL

## 2020-07-29 ENCOUNTER — TELEPHONE (OUTPATIENT)
Dept: TRANSPLANT | Facility: CLINIC | Age: 30
End: 2020-07-29

## 2020-07-29 ENCOUNTER — MEDICAL CORRESPONDENCE (OUTPATIENT)
Dept: HEALTH INFORMATION MANAGEMENT | Facility: CLINIC | Age: 30
End: 2020-07-29

## 2020-07-29 DIAGNOSIS — Z79.899 ENCOUNTER FOR LONG-TERM CURRENT USE OF MEDICATION: ICD-10-CM

## 2020-07-29 DIAGNOSIS — N39.0 RECURRENT UTI: ICD-10-CM

## 2020-07-29 DIAGNOSIS — Z94.0 KIDNEY REPLACED BY TRANSPLANT: Primary | ICD-10-CM

## 2020-07-29 DIAGNOSIS — Z48.298 AFTERCARE FOLLOWING ORGAN TRANSPLANT: ICD-10-CM

## 2020-07-29 DIAGNOSIS — N39.0 URINARY TRACT INFECTION: Primary | ICD-10-CM

## 2020-07-29 RX ORDER — AMPICILLIN TRIHYDRATE 500 MG
500 CAPSULE ORAL 4 TIMES DAILY
Qty: 28 CAPSULE | Refills: 0 | Status: ON HOLD | OUTPATIENT
Start: 2020-07-29 | End: 2020-08-15

## 2020-07-29 RX ORDER — TACROLIMUS 1 MG/1
4 CAPSULE ORAL 2 TIMES DAILY
Qty: 240 CAPSULE | Refills: 11 | Status: SHIPPED | OUTPATIENT
Start: 2020-07-29 | End: 2020-08-10

## 2020-07-29 RX ORDER — TACROLIMUS 0.5 MG/1
CAPSULE ORAL
Qty: 60 CAPSULE | Refills: 11 | COMMUNITY
Start: 2020-07-29 | End: 2020-08-10

## 2020-07-29 NOTE — LETTER
PHYSICIAN ORDERS      DATE & TIME ISSUED: 2020 9:31 AM  PATIENT NAME: Mona Wagner   : 1990     [facility] MR# [if applicable]: [id number]     DIAGNOSIS:  Kidney replaced by transplant  ICD-10 CODE: Z94.0     In addition to current standing orders, please complete the following, due on 2020:    LFTs    Hemoglobin A1c    Uric Acid    Lipid panel    Urine protein/creatinine    DSA PRA (if unable to complete without patient-provided  kit, notify SOT)    PTH    Vitamin D    Allosure     Any questions please call:  [facility], Solid Organ Transplant                                              [phone], ext [phone]    Fax each result, same-day available to:  [phone].    .

## 2020-07-29 NOTE — TELEPHONE ENCOUNTER
Nakul Hill MD Schindelholz, Alanna, RN               Recommend ampicillin 500 mg q6hrs x 7 days          RNCC left VM for Mona to  script for UTI at her preferred pharmacy.

## 2020-07-29 NOTE — TELEPHONE ENCOUNTER
1 year Kidney and Pancreas Transplant Patient Phone Call    Congratulations on your 1 year post-transplant anniversary!    Please re-review with the patient how to contact the Transplant Center:  Best phone contact is [phone] option 5.    When the patient should contact the Transplant Center:    If you run out of your immunosuppression medications.     Prolonged illness that is not resolved after recommendations of the PCP, such as frequent UTI.    Viral infections such as : Shingles (herpes-zoster), CMV, EBV, and Influenza    Cancer    Increased creatinine or pain over your graft site.   Please review the patient's baseline creatinine with them: 1.1 - 1.3     Hospitalizations at facilities other than [facility].    When the patient should visit their local ED or Urgent Care:    Severe dehydration (uncontrolled nausea, vomiting, diarrhea).    Unable to urinate.    Fevers >101    Other medical emergencies.      Medications:  Your tacrolimus goal level will now be 4-6.   REDUCE your tacrolimus to 4mg BID.      *ALWAYS BRING YOUR MED CARD TO APPOINTMENTS.*  Please confirm if the patient is independent with medication set up and administration: YES  Please review if the patient has had any incidents of missed medications: No    Labs:  Please review current lab frequency with the patient after 1 year post transplant:  DUE NOW:12 months post-transplant clinic visit (Fasting labs)    LFTs    Hemoglobin A1c    Uric Acid    Lipid panel    Urine protein/creatinine    DSA PRA    PTH    Vitamin D    Allosure   Labs will now be drawn monthly up to 3 years post-transplant.   Contact the Transplant Center if additional lab orders are needed.  Please update and mail lab letter and orders.    Your coordinator is currently monitoring your most recent transplant complications: recurrent UTI (this will now be followed by ID)    General Transplant Recommendations:    After your August appointment, you will need to be seen again in  February, if this appointment is not scheduled, notify RNCC.      yearly follow up with your primary care provider (PCP)      Follow up with specialty providers as directed (annualy dermatology - referral entered for [facility] per Mona's preference)    Frequent reviews of the transplant handbook and / or [facility] Transplant Place.    Lab review on MyChart.       Melani Vazquez RN, BSN, [facility]  Transplant Care Coordinator

## 2020-07-29 NOTE — TELEPHONE ENCOUNTER
Post Kidney and Pancreas Transplant Team Conference  Date: 7/29/2020  Transplant Coordinator: Laurence Vazquez     Attendees:  [x]  Dr. Hill  [x] Leonela Brewster LPN     []  Dr. Wilson [x] Cinthia Cox RN [] Kiersten Chang LPN   [x]  Dr. Rios [] Gely Lynn, RN     [] Keeley Li RN [x] Anthony Hendricks, RhinaD   [] Dr. Johnson [x] Melani Vazquez RN    [x] Dr. Durbin [] Virgilio Ryan RN    [x] Dr. Pinto [] Ophelia Oneal, ERMELINDA    [x] Dr. Delgadillo [] Yesika Wong, ERMELINDA     [] Melanie Ayala, ERMELINDA    [] Surgery Fellow [x] Nancy Maldonado RN    [x] Johana Linda, JAIME [] Nathalia Moreno RN        Verbal Plan Read Back:   Refer to ID    Routed to RN Coordinator   Leonela Brewster LPN

## 2020-07-29 NOTE — LETTER
Mona Wagner  [address]  [place] [address]                July 29, 2020    Dear Mona,    Congratulations on your upcoming 1 year post-transplant anniversary!    *The best phone contact for the Transplant Office is [phone] option 5*   When you should contact the Transplant Center:    If you run out of your immunosuppression medications.     Prolonged illness that is not resolved after recommendations of the PCP, such as frequent UTI.    Viral infections such as : Shingles (herpes-zoster), CMV, EBV, and Influenza    Cancer    Increased creatinine or pain over your graft site.     Hospitalizations at facilities other than [facility].    When you should visit their local ED or Urgent Care:    Severe dehydration (uncontrolled nausea, vomiting, diarrhea).    Unable to urinate.    Fevers >101    Other medical emergencies.    Medications:    ALWAYS BRING YOUR MED CARD TO APPOINTMENTS.    Please keep your medical insurance up to date.    Do NOT miss your immunosuppression medications.    Labs:    Labs will now be drawn monthly up to 3 years post-transplant.     Contact the Transplant Center if additional lab orders are needed.  General Transplant Recommendations:    Yearly nephrology visits with our MDs.    Yearly follow up with your primary care provider (PCP)     Follow up with specialty providers as directed.    Frequent reviews of the transplant handbook and / or My Transplant Place.    Lab review on MyChart.     Thank you!  Your transplant team

## 2020-07-29 NOTE — LETTER
OUTPATIENT LABORATORY TEST ORDER    Patient Name: Mona Wagner  Transplant Date: 8/3/2019   YOB: 1990  Issue Date & Time: 7/29/2020  9:40 AM  [facility] MR: [id number] Exp. Date (1 year after date issued)      Diagnoses: Kidney Transplant (ICD-10  Z94.0)   Long term use of medications (ICD-10  Z79.899)     Lab results to be available on the same day drawn.   Patient should release information to the [facility] Transplant Center.  Please fax to the Transplant Center at [phone].    Monthly   ?Hemogram and Platelet  ?Basic Metabolic Panel (Sodium, Potassium, Chloride, CO2, Creatinine, Urea Nitrogen,     Glucose,   Calcium)         ?/Tacrolimus/Prograf drug level          ?BK (Polyoma Virus) PCR Quantitative - Plasma                     Every 6 Months                  ?Urine for protein/creatinine    Yearly:   ? PRA/DSA level (mailers provided by the patient)     If you have any questions, please call The Transplant Center at [phone] or [phone].    Please fax labs to [phone]  .

## 2020-08-03 ENCOUNTER — TELEPHONE (OUTPATIENT)
Dept: TRANSPLANT | Facility: CLINIC | Age: 30
End: 2020-08-03

## 2020-08-04 ENCOUNTER — RESULTS ONLY (OUTPATIENT)
Dept: OTHER | Facility: CLINIC | Age: 30
End: 2020-08-04

## 2020-08-04 ENCOUNTER — DOCUMENTATION ONLY (OUTPATIENT)
Dept: TRANSPLANT | Facility: CLINIC | Age: 30
End: 2020-08-04

## 2020-08-04 DIAGNOSIS — N18.30 CKD (CHRONIC KIDNEY DISEASE) STAGE 3, GFR 30-59 ML/MIN (H): ICD-10-CM

## 2020-08-04 DIAGNOSIS — Z79.899 LONG TERM USE OF DRUG: ICD-10-CM

## 2020-08-04 DIAGNOSIS — D63.1 ANEMIA OF CHRONIC RENAL FAILURE, STAGE 3 (MODERATE) (H): ICD-10-CM

## 2020-08-04 DIAGNOSIS — Z79.899 ENCOUNTER FOR LONG-TERM CURRENT USE OF MEDICATION: ICD-10-CM

## 2020-08-04 DIAGNOSIS — N13.30 HYDRONEPHROSIS: ICD-10-CM

## 2020-08-04 DIAGNOSIS — Z48.298 AFTERCARE FOLLOWING ORGAN TRANSPLANT: ICD-10-CM

## 2020-08-04 DIAGNOSIS — Z94.0 KIDNEY REPLACED BY TRANSPLANT: Primary | ICD-10-CM

## 2020-08-04 DIAGNOSIS — N39.0 RECURRENT UTI: ICD-10-CM

## 2020-08-04 DIAGNOSIS — N18.30 ANEMIA OF CHRONIC RENAL FAILURE, STAGE 3 (MODERATE) (H): ICD-10-CM

## 2020-08-04 DIAGNOSIS — Z87.448 HISTORY OF PRIMARY IGA NEPHROPATHY: ICD-10-CM

## 2020-08-04 DIAGNOSIS — Z94.0 KIDNEY REPLACED BY TRANSPLANT: ICD-10-CM

## 2020-08-04 LAB
ALBUMIN SERPL-MCNC: 4 G/DL (ref 3.4–5)
ALBUMIN UR-MCNC: NEGATIVE MG/DL
ALP SERPL-CCNC: 185 U/L (ref 40–150)
ALT SERPL W P-5'-P-CCNC: 19 U/L (ref 0–50)
ANION GAP SERPL CALCULATED.3IONS-SCNC: 5 MMOL/L (ref 3–14)
APPEARANCE UR: CLEAR
AST SERPL W P-5'-P-CCNC: 11 U/L (ref 0–45)
BACTERIA #/AREA URNS HPF: ABNORMAL /HPF
BILIRUB DIRECT SERPL-MCNC: <0.1 MG/DL (ref 0–0.2)
BILIRUB SERPL-MCNC: 0.7 MG/DL (ref 0.2–1.3)
BILIRUB UR QL STRIP: NEGATIVE
BUN SERPL-MCNC: 21 MG/DL (ref 7–30)
CALCIUM SERPL-MCNC: 9.4 MG/DL (ref 8.5–10.1)
CHLORIDE SERPL-SCNC: 111 MMOL/L (ref 94–109)
CO2 SERPL-SCNC: 23 MMOL/L (ref 20–32)
COLOR UR AUTO: ABNORMAL
CREAT SERPL-MCNC: 1 MG/DL (ref 0.52–1.04)
ERYTHROCYTE [DISTWIDTH] IN BLOOD BY AUTOMATED COUNT: 13.1 % (ref 10–15)
GFR SERPL CREATININE-BSD FRML MDRD: 77 ML/MIN/{1.73_M2}
GLUCOSE SERPL-MCNC: 79 MG/DL (ref 70–99)
GLUCOSE UR STRIP-MCNC: NEGATIVE MG/DL
HCT VFR BLD AUTO: 38.9 % (ref 35–47)
HGB BLD-MCNC: 11.9 G/DL (ref 11.7–15.7)
HGB UR QL STRIP: NEGATIVE
KETONES UR STRIP-MCNC: NEGATIVE MG/DL
LEUKOCYTE ESTERASE UR QL STRIP: NEGATIVE
MCH RBC QN AUTO: 28.1 PG (ref 26.5–33)
MCHC RBC AUTO-ENTMCNC: 30.6 G/DL (ref 31.5–36.5)
MCV RBC AUTO: 92 FL (ref 78–100)
NITRATE UR QL: NEGATIVE
PH UR STRIP: 6.5 PH (ref 5–7)
PLATELET # BLD AUTO: 244 10E9/L (ref 150–450)
POTASSIUM SERPL-SCNC: 4.3 MMOL/L (ref 3.4–5.3)
PROT SERPL-MCNC: 7.9 G/DL (ref 6.8–8.8)
PROT UR-MCNC: <0.05 G/L
PROT/CREAT 24H UR: NORMAL G/G CR (ref 0–0.2)
RBC # BLD AUTO: 4.23 10E12/L (ref 3.8–5.2)
RBC #/AREA URNS AUTO: <1 /HPF (ref 0–2)
SODIUM SERPL-SCNC: 139 MMOL/L (ref 133–144)
SOURCE: ABNORMAL
SP GR UR STRIP: 1.01 (ref 1–1.03)
SQUAMOUS #/AREA URNS AUTO: <1 /HPF (ref 0–1)
TACROLIMUS BLD-MCNC: 7.3 UG/L (ref 5–15)
TME LAST DOSE: NORMAL H
UROBILINOGEN UR STRIP-MCNC: NORMAL MG/DL (ref 0–2)
WBC # BLD AUTO: 5.7 10E9/L (ref 4–11)
WBC #/AREA URNS AUTO: 1 /HPF (ref 0–5)

## 2020-08-04 PROCEDURE — 84550 ASSAY OF BLOOD/URIC ACID: CPT | Performed by: INTERNAL MEDICINE

## 2020-08-04 PROCEDURE — 85027 COMPLETE CBC AUTOMATED: CPT | Performed by: INTERNAL MEDICINE

## 2020-08-04 PROCEDURE — 82728 ASSAY OF FERRITIN: CPT | Performed by: INTERNAL MEDICINE

## 2020-08-04 PROCEDURE — 83036 HEMOGLOBIN GLYCOSYLATED A1C: CPT | Performed by: INTERNAL MEDICINE

## 2020-08-04 PROCEDURE — 87799 DETECT AGENT NOS DNA QUANT: CPT | Performed by: INTERNAL MEDICINE

## 2020-08-04 PROCEDURE — 83970 ASSAY OF PARATHORMONE: CPT | Performed by: INTERNAL MEDICINE

## 2020-08-04 PROCEDURE — 86833 HLA CLASS II HIGH DEFIN QUAL: CPT | Performed by: FAMILY MEDICINE

## 2020-08-04 PROCEDURE — 86832 HLA CLASS I HIGH DEFIN QUAL: CPT | Performed by: FAMILY MEDICINE

## 2020-08-04 PROCEDURE — 80197 ASSAY OF TACROLIMUS: CPT | Performed by: INTERNAL MEDICINE

## 2020-08-04 PROCEDURE — 83540 ASSAY OF IRON: CPT | Performed by: INTERNAL MEDICINE

## 2020-08-04 PROCEDURE — 83550 IRON BINDING TEST: CPT | Performed by: INTERNAL MEDICINE

## 2020-08-04 PROCEDURE — 80076 HEPATIC FUNCTION PANEL: CPT | Performed by: INTERNAL MEDICINE

## 2020-08-04 PROCEDURE — 81001 URINALYSIS AUTO W/SCOPE: CPT | Performed by: INTERNAL MEDICINE

## 2020-08-04 PROCEDURE — 80048 BASIC METABOLIC PNL TOTAL CA: CPT | Performed by: INTERNAL MEDICINE

## 2020-08-04 PROCEDURE — 84156 ASSAY OF PROTEIN URINE: CPT | Performed by: INTERNAL MEDICINE

## 2020-08-04 PROCEDURE — 82306 VITAMIN D 25 HYDROXY: CPT | Performed by: INTERNAL MEDICINE

## 2020-08-05 ENCOUNTER — TELEPHONE (OUTPATIENT)
Dept: TRANSPLANT | Facility: CLINIC | Age: 30
End: 2020-08-05

## 2020-08-05 ENCOUNTER — TELEPHONE (OUTPATIENT)
Dept: NEPHROLOGY | Facility: CLINIC | Age: 30
End: 2020-08-05

## 2020-08-05 ENCOUNTER — MEDICAL CORRESPONDENCE (OUTPATIENT)
Dept: HEALTH INFORMATION MANAGEMENT | Facility: CLINIC | Age: 30
End: 2020-08-05

## 2020-08-05 DIAGNOSIS — Z94.0 KIDNEY REPLACED BY TRANSPLANT: Primary | ICD-10-CM

## 2020-08-05 DIAGNOSIS — N13.30 HYDRONEPHROSIS: ICD-10-CM

## 2020-08-05 LAB
DEPRECATED CALCIDIOL+CALCIFEROL SERPL-MC: 14 UG/L (ref 20–75)
FERRITIN SERPL-MCNC: 1065 NG/ML (ref 12–150)
HBA1C MFR BLD: 5.5 % (ref 0–5.6)
IRON SATN MFR SERPL: 32 % (ref 15–46)
IRON SERPL-MCNC: 64 UG/DL (ref 35–180)
PTH-INTACT SERPL-MCNC: 229 PG/ML (ref 18–80)
TIBC SERPL-MCNC: 198 UG/DL (ref 240–430)
URATE SERPL-MCNC: 7.2 MG/DL (ref 2.6–6)

## 2020-08-05 NOTE — TELEPHONE ENCOUNTER
Post Kidney and Pancreas Transplant Team Conference  Date: 8/5/2020  Transplant Coordinator: Laurence Vazquez     Attendees:  [x]  Dr. Hill  [x] Leonela Brewster LPN     [x]  Dr. Wilson [x] Cinthia Cox, ERMELINDA [] Kiersten Chang LPN   []  Dr. Rios [] Gely Lynn, RN     [] Keeley Li RN [] Anthony Hendricks, PharmD   [] Dr. Johnson [x] Melani Vazquez, ERMELINDA    [] Dr. Durbin [] Virgilio Ryan RN    [x] Dr. Pinto [] Ophelia Oneal, ERMELINDA    [x] Dr. Delgadillo [] Yesika Wong RN     [] Melanie Ayala, ERMELINDA    [] Surgery Fellow [x] Nancy Maldonado RN    [x] Johana Linda, JAIME [] Nathalia Moreno, RN        Verbal Plan Read Back:   Ultrasound needed, review w/surgeon  Patient should void well before ultrasound    Routed to RN Coordinator   Leonela Brewster LPN    Mona voiced understanding.  She denies any hypertension (BP actually running soft when checked at recent clinic visit).    US with PVR ordered and request sent to scheduling.

## 2020-08-05 NOTE — TELEPHONE ENCOUNTER
ISSUE:   Tacrolimus IR level 7.3 on 8/4, goal 4-6, dose 4 mg BID.    PLAN:   Please call patient and confirm this was an accurate 12-hour trough. Verify Tacrolimus IR dose 4 mg BID. Confirm no new medications or illness. Confirm no missed doses. If accurate trough and accurate dose, stay on the same dose Tacrolimus IR and repeat labs in 1 week.    OUTCOME:   RNCC spoke with Mona who made her recent dose reduction from 4.5 to 4mg BID last week. She does have some loose stools with her current UTI abx course. Will make NO dose change at this time.  Repeat labs with renal tx US.

## 2020-08-05 NOTE — TELEPHONE ENCOUNTER
August 5, 2020 4:20 PM -  AIVERSE1: called pt to confirm us appt 8/7 pt kathleen labs at [facility] she will denia

## 2020-08-06 ENCOUNTER — TRANSFERRED RECORDS (OUTPATIENT)
Dept: HEALTH INFORMATION MANAGEMENT | Facility: CLINIC | Age: 30
End: 2020-08-06

## 2020-08-06 ENCOUNTER — AMBULATORY - HEALTHEAST (OUTPATIENT)
Dept: MATERNAL FETAL MEDICINE | Facility: HOSPITAL | Age: 30
End: 2020-08-06

## 2020-08-06 DIAGNOSIS — Z31.69 ENCOUNTER FOR PRECONCEPTION CONSULTATION: ICD-10-CM

## 2020-08-07 ENCOUNTER — TRANSCRIBE ORDERS (OUTPATIENT)
Dept: MATERNAL FETAL MEDICINE | Facility: CLINIC | Age: 30
End: 2020-08-07

## 2020-08-07 ENCOUNTER — HOSPITAL ENCOUNTER (OUTPATIENT)
Facility: CLINIC | Age: 30
Setting detail: SPECIMEN
Discharge: HOME | End: 2020-08-07
Admitting: STUDENT IN AN ORGANIZED HEALTH CARE EDUCATION/TRAINING PROGRAM
Payer: MEDICARE

## 2020-08-07 ENCOUNTER — COMMUNICATION - HEALTHEAST (OUTPATIENT)
Dept: MATERNAL FETAL MEDICINE | Facility: HOSPITAL | Age: 30
End: 2020-08-07

## 2020-08-07 ENCOUNTER — ANCILLARY PROCEDURE (OUTPATIENT)
Dept: ULTRASOUND IMAGING | Facility: CLINIC | Age: 30
End: 2020-08-07
Attending: INTERNAL MEDICINE
Payer: MEDICARE

## 2020-08-07 ENCOUNTER — MEDICAL CORRESPONDENCE (OUTPATIENT)
Dept: HEALTH INFORMATION MANAGEMENT | Facility: CLINIC | Age: 30
End: 2020-08-07

## 2020-08-07 DIAGNOSIS — Z48.298 AFTERCARE FOLLOWING ORGAN TRANSPLANT: Primary | ICD-10-CM

## 2020-08-07 DIAGNOSIS — N18.30 CKD (CHRONIC KIDNEY DISEASE) STAGE 3, GFR 30-59 ML/MIN (H): ICD-10-CM

## 2020-08-07 DIAGNOSIS — Z31.69 ENCOUNTER FOR PRECONCEPTION CONSULTATION: Primary | ICD-10-CM

## 2020-08-07 LAB
ANION GAP SERPL CALCULATED.3IONS-SCNC: 5 MMOL/L (ref 3–14)
BKV DNA # SPEC NAA+PROBE: NORMAL COPIES/ML
BKV DNA SPEC NAA+PROBE-LOG#: NORMAL LOG COPIES/ML
BUN SERPL-MCNC: 29 MG/DL (ref 7–30)
CALCIUM SERPL-MCNC: 9.7 MG/DL (ref 8.5–10.1)
CHLORIDE SERPL-SCNC: 109 MMOL/L (ref 94–109)
CHOLEST SERPL-MCNC: 162 MG/DL
CO2 SERPL-SCNC: 23 MMOL/L (ref 20–32)
CREAT SERPL-MCNC: 1.04 MG/DL (ref 0.52–1.04)
DONOR IDENTIFICATION: NORMAL
DSA COMMENTS: NORMAL
DSA PRESENT: NO
DSA TEST METHOD: NORMAL
ERYTHROCYTE [DISTWIDTH] IN BLOOD BY AUTOMATED COUNT: 12.9 % (ref 10–15)
GFR SERPL CREATININE-BSD FRML MDRD: 73 ML/MIN/{1.73_M2}
GLUCOSE SERPL-MCNC: 86 MG/DL (ref 70–99)
HCT VFR BLD AUTO: 39 % (ref 35–47)
HDLC SERPL-MCNC: 39 MG/DL
HGB BLD-MCNC: 11.9 G/DL (ref 11.7–15.7)
LDLC SERPL CALC-MCNC: 98 MG/DL
MCH RBC QN AUTO: 28.1 PG (ref 26.5–33)
MCHC RBC AUTO-ENTMCNC: 30.5 G/DL (ref 31.5–36.5)
MCV RBC AUTO: 92 FL (ref 78–100)
NONHDLC SERPL-MCNC: 122 MG/DL
ORGAN: NORMAL
PLATELET # BLD AUTO: 243 10E9/L (ref 150–450)
POTASSIUM SERPL-SCNC: 4.2 MMOL/L (ref 3.4–5.3)
RBC # BLD AUTO: 4.23 10E12/L (ref 3.8–5.2)
SA1 CELL: NORMAL
SA1 COMMENTS: NORMAL
SA1 HI RISK ABY: NORMAL
SA1 MOD RISK ABY: NORMAL
SA1 TEST METHOD: NORMAL
SA2 CELL: NORMAL
SA2 COMMENTS: NORMAL
SA2 HI RISK ABY UA: NORMAL
SA2 MOD RISK ABY: NORMAL
SA2 TEST METHOD: NORMAL
SODIUM SERPL-SCNC: 138 MMOL/L (ref 133–144)
SPECIMEN SOURCE: NORMAL
TACROLIMUS BLD-MCNC: 7.6 UG/L (ref 5–15)
TME LAST DOSE: NORMAL H
TRIGL SERPL-MCNC: 123 MG/DL
UNACCEPTABLE ANTIGEN: NORMAL
UNOS CPRA: 25
WBC # BLD AUTO: 5.9 10E9/L (ref 4–11)

## 2020-08-07 PROCEDURE — 80048 BASIC METABOLIC PNL TOTAL CA: CPT | Performed by: STUDENT IN AN ORGANIZED HEALTH CARE EDUCATION/TRAINING PROGRAM

## 2020-08-07 PROCEDURE — 85027 COMPLETE CBC AUTOMATED: CPT | Performed by: STUDENT IN AN ORGANIZED HEALTH CARE EDUCATION/TRAINING PROGRAM

## 2020-08-07 PROCEDURE — 80197 ASSAY OF TACROLIMUS: CPT | Performed by: STUDENT IN AN ORGANIZED HEALTH CARE EDUCATION/TRAINING PROGRAM

## 2020-08-07 PROCEDURE — 80061 LIPID PANEL: CPT | Mod: GZ | Performed by: STUDENT IN AN ORGANIZED HEALTH CARE EDUCATION/TRAINING PROGRAM

## 2020-08-07 NOTE — PROGRESS NOTES
Pt. Brought outside orders from [facility] for her blood to be drawn. I drew her blood and I gave to Lizzeth WALTER (clinic supervisor) to take to [employer] to be delivered. Tracking number: [id number].

## 2020-08-10 ENCOUNTER — TELEPHONE (OUTPATIENT)
Dept: TRANSPLANT | Facility: CLINIC | Age: 30
End: 2020-08-10

## 2020-08-10 ENCOUNTER — MYC MEDICAL ADVICE (OUTPATIENT)
Dept: OTHER | Age: 30
End: 2020-08-10

## 2020-08-10 ENCOUNTER — TELEPHONE (OUTPATIENT)
Dept: PHARMACY | Facility: CLINIC | Age: 30
End: 2020-08-10

## 2020-08-10 ENCOUNTER — MEDICAL CORRESPONDENCE (OUTPATIENT)
Dept: HEALTH INFORMATION MANAGEMENT | Facility: CLINIC | Age: 30
End: 2020-08-10

## 2020-08-10 DIAGNOSIS — N13.30 HYDRONEPHROSIS: ICD-10-CM

## 2020-08-10 DIAGNOSIS — R10.0 ACUTE ABDOMEN: ICD-10-CM

## 2020-08-10 DIAGNOSIS — Z94.0 KIDNEY REPLACED BY TRANSPLANT: Primary | ICD-10-CM

## 2020-08-10 RX ORDER — TACROLIMUS 1 MG/1
3 CAPSULE ORAL 2 TIMES DAILY
Qty: 180 CAPSULE | Refills: 11 | Status: SHIPPED | OUTPATIENT
Start: 2020-08-10 | End: 2020-08-19

## 2020-08-10 RX ORDER — TACROLIMUS 0.5 MG/1
0.5 CAPSULE ORAL 2 TIMES DAILY
Qty: 60 CAPSULE | Refills: 11 | Status: SHIPPED | OUTPATIENT
Start: 2020-08-10 | End: 2020-08-19

## 2020-08-10 NOTE — TELEPHONE ENCOUNTER
RNCC left VM for Mona to discuss transient pain over graft with MD at clinic follow up tomorrow, 8/11.

## 2020-08-10 NOTE — TELEPHONE ENCOUNTER
ISSUE:   Tacrolimus IR level 7.6 on 8/7, goal 4-6, dose 4 mg BID.    PLAN:   Please call patient and confirm this was an accurate 12-hour trough. Verify Tacrolimus IR dose 4 mg BID. Confirm no new medications or illness. Confirm no missed doses. If accurate trough and accurate dose, decrease Tacrolimus IR dose to 3.5 mg BID and repeat labs in 2 weeks.    Melani Vazquez RN, BSN, [facility]  Transplant Care Coordinator      OUTCOME:   Spoke with patient, they confirm accurate trough level and current dose 4 mg BID. Patient confirmed dose change to 3.5 mg BID and to repeat labs in 2 weeks. Orders sent to preferred pharmacy for dose change and lab for repeat labs. Patient voiced understanding of plan.     Patient also states that this AM she had pain over kidney txp incision area, rating pain 3 or 4.  Patient states that it went away but wanted to let the team be aware.

## 2020-08-10 NOTE — LETTER
PHYSICIAN ORDERS      DATE & TIME ISSUED: August 10, 2020 12:31 PM  PATIENT NAME: Mona Wagner   : 1990     [facility] MR# [if applicable]: [id number]     DIAGNOSIS:  Kidney Transplant  ICD-10 CODE: Z94.0     Please repeat the following labs in 2 weeks:  Tacrolimus drug level  CBC  BMP    Any questions please call: [phone]  Please fax lab results to [phone].    .

## 2020-08-10 NOTE — TELEPHONE ENCOUNTER
Referred by Dr. Nakul Hill on 08/22/2019    Hgb has been stable since 2/6/2020 with no intervention.    Patient meets criteria for discharge from Anemia Clinic with at least 12 weeks without iron or CHARLINE therapy.    Anemia Latest Ref Rng & Units 4/29/2020 5/6/2020 5/22/2020 6/1/2020 7/8/2020 8/4/2020 8/7/2020   CHARLINE Dose - - - - - - - -   Hemoglobin 11.7 - 15.7 g/dL 12.2 11.9 11.8 11.3(L) 11.2(L) 11.9 11.9   TSAT 15 - 46 % - - - - - 32 -   Ferritin 12 - 150 ng/mL - - - - - 1,065(H) -       Anemia labs discontinued, therapy plans cancelled, removed from active patient list, and referring provider notified.      Manjula Mckinnon RN   Anemia Services  [address] [address]   [email or web address]   Office : [phone]  Fax: [phone]

## 2020-08-10 NOTE — TELEPHONE ENCOUNTER
RNCC spoke with Mona, who has increasing acute pain over the allograft site.    Original pain was at 0500. Pain rate 3-4.  This occurred while she was in bed.  The pain is directly on the allograft, it feels like a twisting / pinching pain.     This occurred again around 2:30pm, after voiding, with a pain of 5/10, which caused her to stop walking.    The 3rd time was also post void, but this pain was lesser 2/10.    She completed ampicillin 6 days ago. She denies any typcial s/s of UTI. She denies any fever.     Mona reports that her urination flow is weak.     Discussed with Dr. Hill (Mona has follow up with him tomorrow).  Will repeat UA /UC, and US.     Orders entered.   US scheduled for 8/11 at 0630 with labs at 0730.  Mona voiced understanding.

## 2020-08-11 ENCOUNTER — MYC MEDICAL ADVICE (OUTPATIENT)
Dept: OTHER | Age: 30
End: 2020-08-11

## 2020-08-11 ENCOUNTER — VIRTUAL VISIT (OUTPATIENT)
Dept: NEPHROLOGY | Facility: CLINIC | Age: 30
End: 2020-08-11
Attending: INTERNAL MEDICINE
Payer: MEDICARE

## 2020-08-11 ENCOUNTER — ANCILLARY PROCEDURE (OUTPATIENT)
Dept: ULTRASOUND IMAGING | Facility: CLINIC | Age: 30
End: 2020-08-11
Attending: INTERNAL MEDICINE
Payer: MEDICARE

## 2020-08-11 ENCOUNTER — TELEPHONE (OUTPATIENT)
Dept: TRANSPLANT | Facility: CLINIC | Age: 30
End: 2020-08-11

## 2020-08-11 VITALS
SYSTOLIC BLOOD PRESSURE: 104 MMHG | WEIGHT: 144.8 LBS | TEMPERATURE: 97.6 F | DIASTOLIC BLOOD PRESSURE: 77 MMHG | BODY MASS INDEX: 28.28 KG/M2

## 2020-08-11 DIAGNOSIS — N13.30 HYDRONEPHROSIS OF KIDNEY TRANSPLANT: ICD-10-CM

## 2020-08-11 DIAGNOSIS — Z94.0 KIDNEY REPLACED BY TRANSPLANT: ICD-10-CM

## 2020-08-11 DIAGNOSIS — R10.0 ACUTE ABDOMEN: ICD-10-CM

## 2020-08-11 DIAGNOSIS — D84.9 IMMUNOSUPPRESSION (H): ICD-10-CM

## 2020-08-11 DIAGNOSIS — E83.42 HYPOMAGNESEMIA: ICD-10-CM

## 2020-08-11 DIAGNOSIS — N39.0 RECURRENT UTI (URINARY TRACT INFECTION): Primary | ICD-10-CM

## 2020-08-11 DIAGNOSIS — N13.30 HYDRONEPHROSIS: ICD-10-CM

## 2020-08-11 DIAGNOSIS — Z48.298 AFTERCARE FOLLOWING ORGAN TRANSPLANT: ICD-10-CM

## 2020-08-11 DIAGNOSIS — T86.19 HYDRONEPHROSIS OF KIDNEY TRANSPLANT: ICD-10-CM

## 2020-08-11 DIAGNOSIS — E55.9 VITAMIN D DEFICIENCY: ICD-10-CM

## 2020-08-11 LAB
ALBUMIN UR-MCNC: NEGATIVE MG/DL
ANION GAP SERPL CALCULATED.3IONS-SCNC: 4 MMOL/L (ref 3–14)
APPEARANCE UR: CLEAR
BILIRUB UR QL STRIP: NEGATIVE
BUN SERPL-MCNC: 32 MG/DL (ref 7–30)
CALCIUM SERPL-MCNC: 9.6 MG/DL (ref 8.5–10.1)
CHLORIDE SERPL-SCNC: 108 MMOL/L (ref 94–109)
CO2 SERPL-SCNC: 25 MMOL/L (ref 20–32)
COLOR UR AUTO: YELLOW
CREAT SERPL-MCNC: 1.27 MG/DL (ref 0.52–1.04)
ERYTHROCYTE [DISTWIDTH] IN BLOOD BY AUTOMATED COUNT: 12.9 % (ref 10–15)
GFR SERPL CREATININE-BSD FRML MDRD: 58 ML/MIN/{1.73_M2}
GLUCOSE SERPL-MCNC: 87 MG/DL (ref 70–99)
GLUCOSE UR STRIP-MCNC: NEGATIVE MG/DL
HCT VFR BLD AUTO: 39.2 % (ref 35–47)
HGB BLD-MCNC: 12.2 G/DL (ref 11.7–15.7)
HGB UR QL STRIP: NEGATIVE
KETONES UR STRIP-MCNC: NEGATIVE MG/DL
LEUKOCYTE ESTERASE UR QL STRIP: NEGATIVE
MCH RBC QN AUTO: 28.7 PG (ref 26.5–33)
MCHC RBC AUTO-ENTMCNC: 31.1 G/DL (ref 31.5–36.5)
MCV RBC AUTO: 92 FL (ref 78–100)
MUCOUS THREADS #/AREA URNS LPF: PRESENT /LPF
NITRATE UR QL: NEGATIVE
PH UR STRIP: 5 PH (ref 5–7)
PLATELET # BLD AUTO: 205 10E9/L (ref 150–450)
POTASSIUM SERPL-SCNC: 4.4 MMOL/L (ref 3.4–5.3)
RBC # BLD AUTO: 4.25 10E12/L (ref 3.8–5.2)
RBC #/AREA URNS AUTO: 1 /HPF (ref 0–2)
SODIUM SERPL-SCNC: 138 MMOL/L (ref 133–144)
SOURCE: ABNORMAL
SP GR UR STRIP: 1.02 (ref 1–1.03)
SQUAMOUS #/AREA URNS AUTO: 1 /HPF (ref 0–1)
TACROLIMUS BLD-MCNC: 9.8 UG/L (ref 5–15)
TME LAST DOSE: NORMAL H
UROBILINOGEN UR STRIP-MCNC: 0 MG/DL (ref 0–2)
WBC # BLD AUTO: 5.6 10E9/L (ref 4–11)
WBC #/AREA URNS AUTO: 2 /HPF (ref 0–5)

## 2020-08-11 PROCEDURE — 80048 BASIC METABOLIC PNL TOTAL CA: CPT | Performed by: INTERNAL MEDICINE

## 2020-08-11 PROCEDURE — 80197 ASSAY OF TACROLIMUS: CPT | Performed by: INTERNAL MEDICINE

## 2020-08-11 PROCEDURE — 87086 URINE CULTURE/COLONY COUNT: CPT | Performed by: INTERNAL MEDICINE

## 2020-08-11 PROCEDURE — 81001 URINALYSIS AUTO W/SCOPE: CPT | Performed by: INTERNAL MEDICINE

## 2020-08-11 PROCEDURE — 85027 COMPLETE CBC AUTOMATED: CPT | Performed by: INTERNAL MEDICINE

## 2020-08-11 PROCEDURE — 36415 COLL VENOUS BLD VENIPUNCTURE: CPT | Performed by: INTERNAL MEDICINE

## 2020-08-11 PROCEDURE — 87088 URINE BACTERIA CULTURE: CPT | Performed by: INTERNAL MEDICINE

## 2020-08-11 ASSESSMENT — PAIN SCALES - GENERAL: PAINLEVEL: NO PAIN (0)

## 2020-08-11 NOTE — LETTER
OUTPATIENT LABORATORY TEST ORDER    Patient Name: Mona Wagner  Transplant Date: 8/3/2019   YOB: 1990                                Issue Date & Time:8/11/2020  4:35 PM  [facility] MR: [id number] Exp. Date (1 year after date issued)      Diagnoses: Kidney Transplant (ICD-10  Z94.0)   Long term use of medications (ICD-10  Z79.899)     Lab results to be available on the same day drawn.   Patient should release information to the [facility].  Please fax to the Transplant Center at [phone].    Weekly labs through 9/30/2020, then  Monthly   ?Hemogram and Platelet  ?Basic Metabolic Panel (Sodium, Potassium, Chloride, CO2, Creatinine, Urea Nitrogen,     Glucose,   Calcium)        ?/Tacrolimus/Prograf drug level     Monthly         ?BK (Polyoma Virus) PCR Quantitative - Plasma                     Every 6 Months                  ?Urine for protein/creatinine    Yearly:   ? PRA/DSA level (mailers provided by the patient)     If you have any questions, please call The Transplant Center at [phone] or [phone].    Please fax labs to [phone]  .

## 2020-08-11 NOTE — TELEPHONE ENCOUNTER
ISSUE:  Ongoing hydro.     PLAN:  If unable to follow up with urology this week, will admit to [facility].    Labs weekly until stable.    OUTCOME:  Mona voiced understanding.

## 2020-08-11 NOTE — LETTER
"8/11/2020      RE: Mona Wagner  [address]  [facility] [address]       Mona Wagner is a 29 year old female who is being evaluated via a billable video visit.      The patient has been notified of following:     \"This video visit will be conducted via a call between you and your physician/provider. We have found that certain health care needs can be provided without the need for an in-person physical exam.  This service lets us provide the care you need with a video conversation.  If a prescription is necessary we can send it directly to your pharmacy.  If lab work is needed we can place an order for that and you can then stop by our lab to have the test done at a later time.    Video visits are billed at different rates depending on your insurance coverage.  Please reach out to your insurance provider with any questions.    If during the course of the call the physician/provider feels a video visit is not appropriate, you will not be charged for this service.\"    Patient has given verbal consent for Video visit? Yes  How would you like to obtain your AVS? Mail a copy  If you are dropped from the video visit, the video invite should be resent to: Send to e-mail at: [email or web address]  Will anyone else be joining your video visit? No    Video-Visit Details    Type of service:  Video Visit    Video Start Time: 1421  Video End Time: 1455    Originating Location (pt. Location): Home    Distant Location (provider location):  [facility] NEPHROLOGY     Platform used for Video Visit: [facility]    Nakul Hill MD      CHRONIC TRANSPLANT NEPHROLOGY VISIT    Assessment & Plan   # DDKT: Stable kidney function lately.  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  Had ureteral stent for a period of time, now removed.  Recent repeat ultrasound showed moderate hydronephrosis.  Patient is going to follow up with Urology.   - Baseline Cr ~ 1.0-1.2   - Proteinuria: Normal (<0.2 " grams)   - Date DSA Last Checked: Aug/2020      Latest DSA: No   - BK Viremia: No   - Kidney Tx Biopsy: Sep 17, 2019; Result: No diagnostic evidence of acute rejection.      # Immunosuppression: Tacrolimus immediate release (goal 4-6) and Mycophenolic acid (dose 540 mg every 12 hours)   - Changes: No    # Infection Prophylaxis:   Last CD4 Level: 201  - PJP: Inhaled pentamidine; Will stop inhaled pentamidine after next treatment.    # Orthostatic Hypotension: Controlled, but low at times on Florinef;  Goal BP: > 100, but < 130 systolic   - Changes: No    # Anemia in Chronic Renal Disease: Hgb: Trend up, now normal      CHARLINE: No   - Iron studies: Replete    # Mineral Bone Disorder:   - Secondary renal hyperparathyroidism; PTH level: Mildly elevated (151-300 pg/ml)        On treatment: Cinacalcet  - Vitamin D; level: Low        On Supplement: No; Not on treatment due to hypercalcemia  - Calcium; level: Normal        On Supplement: No    # Electrolytes:   - Potassium; level: Normal        On supplement: No  - Magnesium; level: Normal        On supplement: Yes  - Bicarbonate; level: Normal        On supplement: No    # Recurrent UTI: Asymptomatic at this time, but has had several episodes.   - Will refer patient to Transplant ID.    # Hyperprolactinemia: Normal prolactin level with last check.  Felt secondary to hypothyroidism versus kidney failure, or less likely now a pituitary microadenoma.  Followed by Endocrinology.    # Alopecia: Patient had symptoms early post transplant, which resolved, but now have returned some, although she also feels she has some hair growth.  Likely due to medications, specifically tacrolimus.  Patient isn't interested in changing medications at this time.     # Medical Compliance: Yes     # COVID-19 Virus Review: Discussed COVID-19 virus and the potential medical risks.  Reviewed preventative health recommendations, which includes washing hands for 20 seconds, avoid touching your face, and  social distancing.  Asked patient to inform the transplant center if they are exposed or diagnosed with this virus.    # Transplant History:  Etiology of Kidney Failure: Unknown etiology (no kidney biopsy)  Tx: DDKT  Transplant: 8/3/2019 (Kidney)  Donor Type: Donation after Circulatory Death  Donor Class: Standard Criteria Donor  Crossmatch at time of Tx: negative  DSA at time of Tx: No  Significant changes in immunosuppression: None  Significant transplant-related complications: None    Transplant Office Phone Number: [phone]    Assessment and plan was discussed with the patient and she voiced her understanding and agreement.    Return visit: Return in about 6 months (around 2/11/2021).    Nakul Hill MD    Chief Complaint   Ms. Wagner is a 29 year old here for kidney transplant and immunosuppression management.     History of Present Illness    Ms. Wagner reports feeling good overall with some medical complaints.  She does report some abdominal pain that start about 4 days ago.  The pain was located over her allograft and woke her up at 5 am.  She describes it as an achy, twisting pain of 3-4 out of 10 and would last about 30 seconds or less.   The pain was associated with the urge to have a bowel movement, although she doesn't always have to go.  The pain was associated with decreased appetite and fatigue, as well as diarrhea when she did have a bowel movement.  In addition, she has some nausea over the weekend.  She had a second episode at 2 pm that same day, which occurred this time after urinating.  No pain or burning with urination.  This twisting pain was ~ 5 out of 10 and lasted 30-60 seconds.  It was bad enough it prevented her from walking.  The next day she had another more brief episode lasting ~ 10-15 seconds.  No episodes since that time.  No further GI symptoms, including no diarrhea.    Her energy level is good and pretty much normal, outside of the few days this last weekend.  She is  active and gets some exercise.  Denies any chest pain or shortness of breath with exertion.  Appetite is good and weight is stable.  No fever, sweats or chills.  No leg swelling.    Recent Hospitalizations:  [x] No [] Yes    New Medical Issues: [] No [x] Yes See above   Decreased energy: [x] No [] Yes Improved   Chest pain or SOB with exertion:  [x] No [] Yes    Appetite change or weight change: [x] No [] Yes    Nausea, vomiting or diarrhea:  [x] No [] Yes    Fever, sweats or chills: [x] No [] Yes    Leg swelling: [x] No [] Yes      Home BP: 100-110/70s.  No lightheadedness.    Review of Systems   A comprehensive review of systems was obtained and negative, except as noted in the HPI or PMH.    Problem List   Patient Active Problem List   Diagnosis     DVT of upper extremity (deep vein thrombosis) (H)     Seizure disorder (H)     Galactorrhea     Acquired hypothyroidism     Increased prolactin level     Hypomagnesemia     Infective endocarditis-history of.  1/2019.  resolved.      Aftercare following organ transplant     Immunosuppression (H)     Methemoglobinemia     Kidney replaced by transplant     Anxiety     Secondary renal hyperparathyroidism (H)     Vitamin D deficiency     CKD (chronic kidney disease) stage 3, GFR 30-59 ml/min (H)     Stricture or kinking of ureter     Pyelonephritis     Diarrhea     Bicornate uterus     Normal Pap 3/2020.  repeat 3/2023     Prophylactic antibiotic     Hydronephrosis     Hydronephrosis of kidney transplant       Social History   Social History     Tobacco Use     Smoking status: Never Smoker     Smokeless tobacco: Never Used   Substance Use Topics     Alcohol use: No     Alcohol/week: 0.0 standard drinks     Drug use: No       Allergies   Allergies   Allergen Reactions     Contrast Dye Rash     CT contrast allergy 12/14/19 rash over eyes. Need to have pre medication before a CT WITH CONTRAST      Chlorhexidine Rash     Rash at site     Sulfa Drugs Rash     Muscle stiffness  of neck     Dapsone      Methemoglobinemia     Furosemide Other (See Comments)     Skin flushing     Lisinopril Swelling     angioedema     Metrogel [Metronidazole]      Hives diffusely on body after vaginal administration.     Cefuroxime Rash       Medications   Current Outpatient Medications   Medication Sig     acetaminophen (TYLENOL) 325 MG tablet Take 2 tablets (650 mg) by mouth every 4 hours as needed for mild pain     aspirin (ASA) 81 MG chewable tablet CHEW AND SWALLOW 1 TABLET DAILY     atorvastatin (LIPITOR) 10 MG tablet Take 1 tablet (10 mg) by mouth daily     cinacalcet (SENSIPAR) 30 MG tablet Take 1 tablet (30 mg) by mouth daily     diphenhydrAMINE (BENADRYL) 25 MG capsule Take 1-2 tablets by mouth every 6 hours PRN itching/allergies     EPINEPHrine (ANY BX GENERIC EQUIV) 0.3 MG/0.3ML injection 2-pack Inject 0.3 mLs (0.3 mg) into the muscle as needed for anaphylaxis     fludrocortisone (FLORINEF) 0.1 MG tablet Take 1 tablet (0.1 mg) by mouth Every Mon, Wed, Fri Morning     hydrOXYzine (ATARAX) 10 MG tablet Take 1 tablet (10 mg) by mouth 3 times daily as needed for anxiety     lactobacillus rhamnosus, GG, (CULTURELL) capsule Take 1 capsule by mouth daily     levothyroxine (SYNTHROID/LEVOTHROID) 25 MCG tablet Take 1 tablet (25 mcg) Tuesday, Thursday, Saturday and Sunday and 1.5 tablets (37.5 mcg) on Monday, Wednesday and Friday every week.     magnesium oxide (MAG-OX) 400 MG tablet Take 1 tablet (400 mg) by mouth daily     MYFORTIC (BRAND) 180 MG EC tablet Take 3 tablets (540 mg) by mouth 2 times daily     norethindrone (MICRONOR) 0.35 MG tablet Do not restart until your follow up appointment with your surgeon. Discuss restart date at that appointment.     psyllium (METAMUCIL/KONSYL) capsule Take 1 capsule by mouth daily (Patient taking differently: Take 1 capsule by mouth daily as needed (loose stools) )     simethicone (MYLICON) 125 MG chewable tablet Take 1 tablet (125 mg) by mouth 4 times daily as  needed for intestinal gas     sodium bicarbonate 650 MG tablet TAKE 3 TABLETS(1950 MG) BY MOUTH TWICE DAILY     cephALEXin (KEFLEX) 500 MG capsule Take 1 capsule (500 mg) by mouth 3 times daily for 7 days     oxybutynin (DITROPAN) 5 MG tablet Take 1 tablet (5 mg) by mouth 3 times daily as needed for bladder spasms     oxybutynin (DITROPAN) 5 MG tablet Take 1 tablet (5 mg) by mouth 3 times daily as needed for bladder spasms     oxyCODONE (ROXICODONE) 5 MG tablet Take 1-2 tablets (5-10 mg) by mouth every 4 hours as needed for moderate to severe pain     phenazopyridine (PYRIDIUM) 200 MG tablet Take 1 tablet (200 mg) by mouth 3 times daily as needed for irritation     senna-docusate (SENOKOT-S/PERICOLACE) 8.6-50 MG tablet Take 1-2 tablets by mouth 2 times daily     tacrolimus (GENERIC EQUIVALENT) 0.5 MG capsule HOLD     tacrolimus (GENERIC EQUIVALENT) 1 MG capsule Take 3 capsules (3 mg) by mouth 2 times daily     tamsulosin (FLOMAX) 0.4 MG capsule Take 1 capsule (0.4 mg) by mouth daily     tolterodine (DETROL) 2 MG tablet Take 1 tablet (2 mg) by mouth every 12 hours as needed for incontinence (Spasms)     No current facility-administered medications for this visit.      There are no discontinued medications.     Physical Exam  Vital signs were deferred for this telemedicine visit.    GENERAL APPEARANCE: alert and no distress  HENT: no obvious abnormalities on appearance  RESP: breathing appears unremarkable with normal rate, no audible wheezing or cough and no apparent shortness of breath with conversation  MS: extremities normal - no gross deformities noted  SKIN: no apparent rash and normal skin tone  NEURO: speech is clear with no obvious neurological deficits  PSYCH: mentation appears normal and affect normal    Data     Renal Latest Ref Rng & Units 8/18/2020 8/15/2020 8/11/2020   Na 133 - 144 mmol/L 138 140 138   Na (external) 136 - 145 mmol/L - - -   K 3.4 - 5.3 mmol/L 4.6 4.4 4.4   K (external) 3.5 - 5.0 mmol/L  - - -   Cl 94 - 109 mmol/L 110(H) 111(H) 108   Cl (external) 98 - 107 mmol/L - - -   CO2 20 - 32 mmol/L 22 24 25   CO2 (external) 22 - 31 mmol/L - - -   BUN 7 - 30 mg/dL 26 31(H) 32(H)   BUN (external) 8 - 22 mg/dL - - -   Cr 0.52 - 1.04 mg/dL 1.07(H) 1.01 1.27(H)   Cr (external) 0.60 - 1 mg/dL - - -   Glucose 70 - 99 mg/dL 92 99 87   Glucose (external) 70 - 125 mg/dL - - -   Ca  8.5 - 10.1 mg/dL 9.7 9.1 9.6   Ca (external) 8.5 - 10.5 mg/dL - - -   Mg 1.6 - 2.3 mg/dL - 1.7 -   Mg (external) 1.8 - 2.6 mg/dL - - -     Bone Health Latest Ref Rng & Units 8/15/2020 8/4/2020 4/29/2020   Phos 2.5 - 4.5 mg/dL 3.2 - 2.7   Phos (external) 2.5 - 4.5 mg/dL - - -   PTHi 18 - 80 pg/mL - 229(H) -   PTHi (external) 18 - 80 pg/mL - - -   Vit D Def 20 - 75 ug/L - 14(L) -     Heme Latest Ref Rng & Units 8/18/2020 8/15/2020 8/11/2020   WBC 4.0 - 11.0 10e9/L 5.9 5.7 5.6   WBC (external) 4.0 - 11.0 thou/uL - - -   Hgb 11.7 - 15.7 g/dL 11.8 11.0(L) 12.2   Hgb (external) 12.0 - 16.0 g/dL - - -   Plt 150 - 450 10e9/L 229 204 205   Plt (external) 140 - 440 thou/uL - - -   ABSOLUTE NEUTROPHIL 1.6 - 8.3 10e9/L - - -   ABSOLUTE NEUTROPHILS (EXTERNAL) 2.0 - 7.7 thou/uL - - -   ABSOLUTE LYMPHOCYTES 0.8 - 5.3 10e9/L - - -   ABSOLUTE LYMPHOCYTES (EXTERNAL) 0.8 - 4.4 thou/uL - - -   ABSOLUTE MONOCYTES 0.0 - 1.3 10e9/L - - -   ABSOLUTE MONOCYTES (EXTERNAL) 0.0 - 0.9 thou/uL - - -   ABSOLUTE EOSINOPHILS 0.0 - 0.7 10e9/L - - -   ABSOLUTE EOSINOPHILS (EXTERNAL) 0.0 - 0.4 thou/uL - - -   ABSOLUTE BASOPHILS 0.0 - 0.2 10e9/L - - -   ABSOLUTE BASOPHILS (EXTERNAL) 0.0 - 0.2 thou/uL - - -   ABS IMMATURE GRANULOCYTES 0 - 0.4 10e9/L - - -   ABSOLUTE NUCLEATED RBC - - - -     Liver Latest Ref Rng & Units 8/4/2020 2/3/2020 1/23/2020   AP 40 - 150 U/L 185(H) 126 210(H)   AP (external) 34 - 104 U/L - - -   TBili 0.2 - 1.3 mg/dL 0.7 0.6 1.2   DBili 0.0 - 0.2 mg/dL <0.1 0.1 -   ALT 0 - 50 U/L 19 20 20   ALT (external) 7 - 52 U/L - - -   AST 0 - 45 U/L 11 13 9   AST  (external) 13 - 39 U/L - - -   Tot Protein 6.8 - 8.8 g/dL 7.9 7.4 8.1   Tot Protein (external) 6.4 - 8.9 g/dL - - -   Albumin 3.4 - 5.0 g/dL 4.0 3.7 4.5   Albumin (external) - - - -     Pancreas Latest Ref Rng & Units 8/4/2020 2/3/2020 8/3/2019   A1C 0 - 5.6 % 5.5 5.7(H) 4.8     Iron studies Latest Ref Rng & Units 8/4/2020 2/17/2020 12/3/2019   Iron 35 - 180 ug/dL 64 95 47   Iron sat 15 - 46 % 32 42 23   Ferritin 12 - 150 ng/mL 1,065(H) 1,139(H) 1,003(H)   Ferritin (external) 10 - 291 ng/mL - - -     [facility] Txp Virology Latest Ref Rng & Units 8/4/2020 7/8/2020 6/1/2020   CVM DNA Quant - - - -   CMV QUANT IU/ML CMVND:CMV DNA Not Detected [IU]/mL - - -   LOG IU/ML OF CMVQNT <2.1 [Log:IU]/mL - - -   BK Spec - Plasma Plasma, EDTA anticoagulant EDTA PLASMA   BK Res BKNEG:BK Virus DNA Not Detected copies/mL BK Virus DNA Not Detected BK Virus DNA Not Detected BK Virus DNA Not Detected   BK Log <2.7 Log copies/mL Not Calculated Not Calculated Not Calculated   EBV VCA IGG ANTIBODY U/mL - - -   EBV CAPSID ANTIBODY IGG 0.0 - 0.8 AI - - -   EBV DNA COPIES/ML EBVNEG:EBV DNA Not Detected [Copies]/mL - - -   EBV DNA LOG OF COPIES <2.7 [Log:copies]/mL - - -   Hep B Surf - - - -        Recent Labs   Lab Test 08/07/20  0915 08/11/20  0704 08/18/20  0940   DOSTAC 08/06/20 2100PM 08/10/2020 9PM 08/17/20 2120   TACROL 7.6 9.8 8.0     Recent Labs   Lab Test 08/16/19  0807 09/03/19  0944   DOSMPA Not Provided 0840pm   MPACID 1.92 3.39   MPAG 149.2* 52.8     Nakul Hill MD

## 2020-08-11 NOTE — PROGRESS NOTES
"Mona Wagner is a 29 year old female who is being evaluated via a billable video visit.      The patient has been notified of following:     \"This video visit will be conducted via a call between you and your physician/provider. We have found that certain health care needs can be provided without the need for an in-person physical exam.  This service lets us provide the care you need with a video conversation.  If a prescription is necessary we can send it directly to your pharmacy.  If lab work is needed we can place an order for that and you can then stop by our lab to have the test done at a later time.    Video visits are billed at different rates depending on your insurance coverage.  Please reach out to your insurance provider with any questions.    If during the course of the call the physician/provider feels a video visit is not appropriate, you will not be charged for this service.\"    Patient has given verbal consent for Video visit? Yes  How would you like to obtain your AVS? Mail a copy  If you are dropped from the video visit, the video invite should be resent to: Send to e-mail at: [email or web address]  Will anyone else be joining your video visit? No    Video-Visit Details    Type of service:  Video Visit    Video Start Time: 1421  Video End Time: 1455    Originating Location (pt. Location): Home    Distant Location (provider location):  [facility] NEPHROLOGY     Platform used for Video Visit: [facility]    Nakul Hill MD      CHRONIC TRANSPLANT NEPHROLOGY VISIT    Assessment & Plan   # DDKT: Stable kidney function lately.  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  Had ureteral stent for a period of time, now removed.  Recent repeat ultrasound showed moderate hydronephrosis.  Patient is going to follow up with Urology.   - Baseline Cr ~ 1.0-1.2   - Proteinuria: Normal (<0.2 grams)   - Date DSA Last Checked: Aug/2020      Latest DSA: No   - BK Viremia: No   - " Kidney Tx Biopsy: Sep 17, 2019; Result: No diagnostic evidence of acute rejection.      # Immunosuppression: Tacrolimus immediate release (goal 4-6) and Mycophenolic acid (dose 540 mg every 12 hours)   - Changes: No    # Infection Prophylaxis:   Last CD4 Level: 201  - PJP: Inhaled pentamidine; Will stop inhaled pentamidine after next treatment.    # Orthostatic Hypotension: Controlled, but low at times on Florinef;  Goal BP: > 100, but < 130 systolic   - Changes: No    # Anemia in Chronic Renal Disease: Hgb: Trend up, now normal      CHARLINE: No   - Iron studies: Replete    # Mineral Bone Disorder:   - Secondary renal hyperparathyroidism; PTH level: Mildly elevated (151-300 pg/ml)        On treatment: Cinacalcet  - Vitamin D; level: Low        On Supplement: No; Not on treatment due to hypercalcemia  - Calcium; level: Normal        On Supplement: No    # Electrolytes:   - Potassium; level: Normal        On supplement: No  - Magnesium; level: Normal        On supplement: Yes  - Bicarbonate; level: Normal        On supplement: No    # Recurrent UTI: Asymptomatic at this time, but has had several episodes.   - Will refer patient to Transplant ID.    # Hyperprolactinemia: Normal prolactin level with last check.  Felt secondary to hypothyroidism versus kidney failure, or less likely now a pituitary microadenoma.  Followed by Endocrinology.    # Alopecia: Patient had symptoms early post transplant, which resolved, but now have returned some, although she also feels she has some hair growth.  Likely due to medications, specifically tacrolimus.  Patient isn't interested in changing medications at this time.     # Medical Compliance: Yes     # COVID-19 Virus Review: Discussed COVID-19 virus and the potential medical risks.  Reviewed preventative health recommendations, which includes washing hands for 20 seconds, avoid touching your face, and social distancing.  Asked patient to inform the transplant center if they are exposed  or diagnosed with this virus.    # Transplant History:  Etiology of Kidney Failure: Unknown etiology (no kidney biopsy)  Tx: DDKT  Transplant: 8/3/2019 (Kidney)  Donor Type: Donation after Circulatory Death  Donor Class: Standard Criteria Donor  Crossmatch at time of Tx: negative  DSA at time of Tx: No  Significant changes in immunosuppression: None  Significant transplant-related complications: None    Transplant Office Phone Number: [phone]    Assessment and plan was discussed with the patient and she voiced her understanding and agreement.    Return visit: Return in about 6 months (around 2/11/2021).    Nakul Hill MD    Chief Complaint   Ms. Wagner is a 29 year old here for kidney transplant and immunosuppression management.     History of Present Illness    Ms. Wagner reports feeling good overall with some medical complaints.  She does report some abdominal pain that start about 4 days ago.  The pain was located over her allograft and woke her up at 5 am.  She describes it as an achy, twisting pain of 3-4 out of 10 and would last about 30 seconds or less.   The pain was associated with the urge to have a bowel movement, although she doesn't always have to go.  The pain was associated with decreased appetite and fatigue, as well as diarrhea when she did have a bowel movement.  In addition, she has some nausea over the weekend.  She had a second episode at 2 pm that same day, which occurred this time after urinating.  No pain or burning with urination.  This twisting pain was ~ 5 out of 10 and lasted 30-60 seconds.  It was bad enough it prevented her from walking.  The next day she had another more brief episode lasting ~ 10-15 seconds.  No episodes since that time.  No further GI symptoms, including no diarrhea.    Her energy level is good and pretty much normal, outside of the few days this last weekend.  She is active and gets some exercise.  Denies any chest pain or shortness of breath with  exertion.  Appetite is good and weight is stable.  No fever, sweats or chills.  No leg swelling.    Recent Hospitalizations:  [x] No [] Yes    New Medical Issues: [] No [x] Yes See above   Decreased energy: [x] No [] Yes Improved   Chest pain or SOB with exertion:  [x] No [] Yes    Appetite change or weight change: [x] No [] Yes    Nausea, vomiting or diarrhea:  [x] No [] Yes    Fever, sweats or chills: [x] No [] Yes    Leg swelling: [x] No [] Yes      Home BP: 100-110/70s.  No lightheadedness.    Review of Systems   A comprehensive review of systems was obtained and negative, except as noted in the HPI or PMH.    Problem List   Patient Active Problem List   Diagnosis     DVT of upper extremity (deep vein thrombosis) (H)     Seizure disorder (H)     Galactorrhea     Acquired hypothyroidism     Increased prolactin level     Hypomagnesemia     Infective endocarditis-history of.  1/2019.  resolved.      Aftercare following organ transplant     Immunosuppression (H)     Methemoglobinemia     Kidney replaced by transplant     Anxiety     Secondary renal hyperparathyroidism (H)     Vitamin D deficiency     CKD (chronic kidney disease) stage 3, GFR 30-59 ml/min (H)     Stricture or kinking of ureter     Pyelonephritis     Diarrhea     Bicornate uterus     Normal Pap 3/2020.  repeat 3/2023     Prophylactic antibiotic     Hydronephrosis     Hydronephrosis of kidney transplant       Social History   Social History     Tobacco Use     Smoking status: Never Smoker     Smokeless tobacco: Never Used   Substance Use Topics     Alcohol use: No     Alcohol/week: 0.0 standard drinks     Drug use: No       Allergies   Allergies   Allergen Reactions     Contrast Dye Rash     CT contrast allergy 12/14/19 rash over eyes. Need to have pre medication before a CT WITH CONTRAST      Chlorhexidine Rash     Rash at site     Sulfa Drugs Rash     Muscle stiffness of neck     Dapsone      Methemoglobinemia     Furosemide Other (See Comments)      Skin flushing     Lisinopril Swelling     angioedema     Metrogel [Metronidazole]      Hives diffusely on body after vaginal administration.     Cefuroxime Rash       Medications   Current Outpatient Medications   Medication Sig     acetaminophen (TYLENOL) 325 MG tablet Take 2 tablets (650 mg) by mouth every 4 hours as needed for mild pain     aspirin (ASA) 81 MG chewable tablet CHEW AND SWALLOW 1 TABLET DAILY     atorvastatin (LIPITOR) 10 MG tablet Take 1 tablet (10 mg) by mouth daily     cinacalcet (SENSIPAR) 30 MG tablet Take 1 tablet (30 mg) by mouth daily     diphenhydrAMINE (BENADRYL) 25 MG capsule Take 1-2 tablets by mouth every 6 hours PRN itching/allergies     EPINEPHrine (ANY BX GENERIC EQUIV) 0.3 MG/0.3ML injection 2-pack Inject 0.3 mLs (0.3 mg) into the muscle as needed for anaphylaxis     fludrocortisone (FLORINEF) 0.1 MG tablet Take 1 tablet (0.1 mg) by mouth Every Mon, Wed, Fri Morning     hydrOXYzine (ATARAX) 10 MG tablet Take 1 tablet (10 mg) by mouth 3 times daily as needed for anxiety     lactobacillus rhamnosus, GG, (CULTURELL) capsule Take 1 capsule by mouth daily     levothyroxine (SYNTHROID/LEVOTHROID) 25 MCG tablet Take 1 tablet (25 mcg) Tuesday, Thursday, Saturday and Sunday and 1.5 tablets (37.5 mcg) on Monday, Wednesday and Friday every week.     magnesium oxide (MAG-OX) 400 MG tablet Take 1 tablet (400 mg) by mouth daily     MYFORTIC (BRAND) 180 MG EC tablet Take 3 tablets (540 mg) by mouth 2 times daily     norethindrone (MICRONOR) 0.35 MG tablet Do not restart until your follow up appointment with your surgeon. Discuss restart date at that appointment.     psyllium (METAMUCIL/KONSYL) capsule Take 1 capsule by mouth daily (Patient taking differently: Take 1 capsule by mouth daily as needed (loose stools) )     simethicone (MYLICON) 125 MG chewable tablet Take 1 tablet (125 mg) by mouth 4 times daily as needed for intestinal gas     sodium bicarbonate 650 MG tablet TAKE 3 TABLETS(1950 MG)  BY MOUTH TWICE DAILY     cephALEXin (KEFLEX) 500 MG capsule Take 1 capsule (500 mg) by mouth 3 times daily for 7 days     oxybutynin (DITROPAN) 5 MG tablet Take 1 tablet (5 mg) by mouth 3 times daily as needed for bladder spasms     oxybutynin (DITROPAN) 5 MG tablet Take 1 tablet (5 mg) by mouth 3 times daily as needed for bladder spasms     oxyCODONE (ROXICODONE) 5 MG tablet Take 1-2 tablets (5-10 mg) by mouth every 4 hours as needed for moderate to severe pain     phenazopyridine (PYRIDIUM) 200 MG tablet Take 1 tablet (200 mg) by mouth 3 times daily as needed for irritation     senna-docusate (SENOKOT-S/PERICOLACE) 8.6-50 MG tablet Take 1-2 tablets by mouth 2 times daily     tacrolimus (GENERIC EQUIVALENT) 0.5 MG capsule HOLD     tacrolimus (GENERIC EQUIVALENT) 1 MG capsule Take 3 capsules (3 mg) by mouth 2 times daily     tamsulosin (FLOMAX) 0.4 MG capsule Take 1 capsule (0.4 mg) by mouth daily     tolterodine (DETROL) 2 MG tablet Take 1 tablet (2 mg) by mouth every 12 hours as needed for incontinence (Spasms)     No current facility-administered medications for this visit.      There are no discontinued medications.     Physical Exam  Vital signs were deferred for this telemedicine visit.    GENERAL APPEARANCE: alert and no distress  HENT: no obvious abnormalities on appearance  RESP: breathing appears unremarkable with normal rate, no audible wheezing or cough and no apparent shortness of breath with conversation  MS: extremities normal - no gross deformities noted  SKIN: no apparent rash and normal skin tone  NEURO: speech is clear with no obvious neurological deficits  PSYCH: mentation appears normal and affect normal    Data     Renal Latest Ref Rng & Units 8/18/2020 8/15/2020 8/11/2020   Na 133 - 144 mmol/L 138 140 138   Na (external) 136 - 145 mmol/L - - -   K 3.4 - 5.3 mmol/L 4.6 4.4 4.4   K (external) 3.5 - 5.0 mmol/L - - -   Cl 94 - 109 mmol/L 110(H) 111(H) 108   Cl (external) 98 - 107 mmol/L - - -    CO2 20 - 32 mmol/L 22 24 25   CO2 (external) 22 - 31 mmol/L - - -   BUN 7 - 30 mg/dL 26 31(H) 32(H)   BUN (external) 8 - 22 mg/dL - - -   Cr 0.52 - 1.04 mg/dL 1.07(H) 1.01 1.27(H)   Cr (external) 0.60 - 1 mg/dL - - -   Glucose 70 - 99 mg/dL 92 99 87   Glucose (external) 70 - 125 mg/dL - - -   Ca  8.5 - 10.1 mg/dL 9.7 9.1 9.6   Ca (external) 8.5 - 10.5 mg/dL - - -   Mg 1.6 - 2.3 mg/dL - 1.7 -   Mg (external) 1.8 - 2.6 mg/dL - - -     Bone Health Latest Ref Rng & Units 8/15/2020 8/4/2020 4/29/2020   Phos 2.5 - 4.5 mg/dL 3.2 - 2.7   Phos (external) 2.5 - 4.5 mg/dL - - -   PTHi 18 - 80 pg/mL - 229(H) -   PTHi (external) 18 - 80 pg/mL - - -   Vit D Def 20 - 75 ug/L - 14(L) -     Heme Latest Ref Rng & Units 8/18/2020 8/15/2020 8/11/2020   WBC 4.0 - 11.0 10e9/L 5.9 5.7 5.6   WBC (external) 4.0 - 11.0 thou/uL - - -   Hgb 11.7 - 15.7 g/dL 11.8 11.0(L) 12.2   Hgb (external) 12.0 - 16.0 g/dL - - -   Plt 150 - 450 10e9/L 229 204 205   Plt (external) 140 - 440 thou/uL - - -   ABSOLUTE NEUTROPHIL 1.6 - 8.3 10e9/L - - -   ABSOLUTE NEUTROPHILS (EXTERNAL) 2.0 - 7.7 thou/uL - - -   ABSOLUTE LYMPHOCYTES 0.8 - 5.3 10e9/L - - -   ABSOLUTE LYMPHOCYTES (EXTERNAL) 0.8 - 4.4 thou/uL - - -   ABSOLUTE MONOCYTES 0.0 - 1.3 10e9/L - - -   ABSOLUTE MONOCYTES (EXTERNAL) 0.0 - 0.9 thou/uL - - -   ABSOLUTE EOSINOPHILS 0.0 - 0.7 10e9/L - - -   ABSOLUTE EOSINOPHILS (EXTERNAL) 0.0 - 0.4 thou/uL - - -   ABSOLUTE BASOPHILS 0.0 - 0.2 10e9/L - - -   ABSOLUTE BASOPHILS (EXTERNAL) 0.0 - 0.2 thou/uL - - -   ABS IMMATURE GRANULOCYTES 0 - 0.4 10e9/L - - -   ABSOLUTE NUCLEATED RBC - - - -     Liver Latest Ref Rng & Units 8/4/2020 2/3/2020 1/23/2020   AP 40 - 150 U/L 185(H) 126 210(H)   AP (external) 34 - 104 U/L - - -   TBili 0.2 - 1.3 mg/dL 0.7 0.6 1.2   DBili 0.0 - 0.2 mg/dL <0.1 0.1 -   ALT 0 - 50 U/L 19 20 20   ALT (external) 7 - 52 U/L - - -   AST 0 - 45 U/L 11 13 9   AST (external) 13 - 39 U/L - - -   Tot Protein 6.8 - 8.8 g/dL 7.9 7.4 8.1   Tot Protein  (external) 6.4 - 8.9 g/dL - - -   Albumin 3.4 - 5.0 g/dL 4.0 3.7 4.5   Albumin (external) - - - -     Pancreas Latest Ref Rng & Units 8/4/2020 2/3/2020 8/3/2019   A1C 0 - 5.6 % 5.5 5.7(H) 4.8     Iron studies Latest Ref Rng & Units 8/4/2020 2/17/2020 12/3/2019   Iron 35 - 180 ug/dL 64 95 47   Iron sat 15 - 46 % 32 42 23   Ferritin 12 - 150 ng/mL 1,065(H) 1,139(H) 1,003(H)   Ferritin (external) 10 - 291 ng/mL - - -     [facility] Txp Virology Latest Ref Rng & Units 8/4/2020 7/8/2020 6/1/2020   CVM DNA Quant - - - -   CMV QUANT IU/ML CMVND:CMV DNA Not Detected [IU]/mL - - -   LOG IU/ML OF CMVQNT <2.1 [Log:IU]/mL - - -   BK Spec - Plasma Plasma, EDTA anticoagulant EDTA PLASMA   BK Res BKNEG:BK Virus DNA Not Detected copies/mL BK Virus DNA Not Detected BK Virus DNA Not Detected BK Virus DNA Not Detected   BK Log <2.7 Log copies/mL Not Calculated Not Calculated Not Calculated   EBV VCA IGG ANTIBODY U/mL - - -   EBV CAPSID ANTIBODY IGG 0.0 - 0.8 AI - - -   EBV DNA COPIES/ML EBVNEG:EBV DNA Not Detected [Copies]/mL - - -   EBV DNA LOG OF COPIES <2.7 [Log:copies]/mL - - -   Hep B Surf - - - -        Recent Labs   Lab Test 08/07/20  0915 08/11/20  0704 08/18/20  0940   DOSTAC 08/06/20 2100PM 08/10/2020 9PM 08/17/20 2120   TACROL 7.6 9.8 8.0     Recent Labs   Lab Test 08/16/19  0807 09/03/19  0944   DOSMPA Not Provided 0840pm   MPACID 1.92 3.39   MPAG 149.2* 52.8

## 2020-08-11 NOTE — LETTER
"8/11/2020       RE: Mona Wagner  [address]  [place] [address]     Dear Colleague,    Thank you for referring your patient, Mona Wagner, to the [facility] NEPHROLOGY at [facility]. Please see a copy of my visit note below.    Mona Wagner is a 29 year old female who is being evaluated via a billable video visit.      The patient has been notified of following:     \"This video visit will be conducted via a call between you and your physician/provider. We have found that certain health care needs can be provided without the need for an in-person physical exam.  This service lets us provide the care you need with a video conversation.  If a prescription is necessary we can send it directly to your pharmacy.  If lab work is needed we can place an order for that and you can then stop by our lab to have the test done at a later time.    Video visits are billed at different rates depending on your insurance coverage.  Please reach out to your insurance provider with any questions.    If during the course of the call the physician/provider feels a video visit is not appropriate, you will not be charged for this service.\"    Patient has given verbal consent for Video visit? Yes  How would you like to obtain your AVS? Mail a copy  If you are dropped from the video visit, the video invite should be resent to: Send to e-mail at: [email or web address]  Will anyone else be joining your video visit? No    Video-Visit Details    Type of service:  Video Visit    Video Start Time: 1421  Video End Time: 1455    Originating Location (pt. Location): Home    Distant Location (provider location):  [facility] NEPHROLOGY     Platform used for Video Visit: Prince Hill MD      CHRONIC TRANSPLANT NEPHROLOGY VISIT    Assessment & Plan   # DDKT: Stable kidney function lately.  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  Had ureteral stent for a " Brother is requesting documentation of patient's weight loss for replacement prosthesis, as his current prosthesis no longer fits him.  Please advise and thank you, Dee Dee   period of time, now removed.  Recent repeat ultrasound showed moderate hydronephrosis.  Patient is going to follow up with Urology.   - Baseline Cr ~ 1.0-1.2   - Proteinuria: Normal (<0.2 grams)   - Date DSA Last Checked: Aug/2020      Latest DSA: No   - BK Viremia: No   - Kidney Tx Biopsy: Sep 17, 2019; Result: No diagnostic evidence of acute rejection.      # Immunosuppression: Tacrolimus immediate release (goal 4-6) and Mycophenolic acid (dose 540 mg every 12 hours)   - Changes: No    # Infection Prophylaxis:   Last CD4 Level: 201  - PJP: Inhaled pentamidine; Will stop inhaled pentamidine after next treatment.    # Orthostatic Hypotension: Controlled, but low at times on Florinef;  Goal BP: > 100, but < 130 systolic   - Changes: No    # Anemia in Chronic Renal Disease: Hgb: Trend up, now normal      CHARLINE: No   - Iron studies: Replete    # Mineral Bone Disorder:   - Secondary renal hyperparathyroidism; PTH level: Mildly elevated (151-300 pg/ml)        On treatment: Cinacalcet  - Vitamin D; level: Low        On Supplement: No; Not on treatment due to hypercalcemia  - Calcium; level: Normal        On Supplement: No    # Electrolytes:   - Potassium; level: Normal        On supplement: No  - Magnesium; level: Normal        On supplement: Yes  - Bicarbonate; level: Normal        On supplement: No    # Recurrent UTI: Asymptomatic at this time, but has had several episodes.   - Will refer patient to Transplant ID.    # Hyperprolactinemia: Normal prolactin level with last check.  Felt secondary to hypothyroidism versus kidney failure, or less likely now a pituitary microadenoma.  Followed by Endocrinology.    # Alopecia: Patient had symptoms early post transplant, which resolved, but now have returned some, although she also feels she has some hair growth.  Likely due to medications, specifically tacrolimus.  Patient isn't interested in changing medications at this time.     # Medical Compliance: Yes     # COVID-19 Virus  Review: Discussed COVID-19 virus and the potential medical risks.  Reviewed preventative health recommendations, which includes washing hands for 20 seconds, avoid touching your face, and social distancing.  Asked patient to inform the transplant center if they are exposed or diagnosed with this virus.    # Transplant History:  Etiology of Kidney Failure: Unknown etiology (no kidney biopsy)  Tx: DDKT  Transplant: 8/3/2019 (Kidney)  Donor Type: Donation after Circulatory Death  Donor Class: Standard Criteria Donor  Crossmatch at time of Tx: negative  DSA at time of Tx: No  Significant changes in immunosuppression: None  Significant transplant-related complications: None    Transplant Office Phone Number: [phone]    Assessment and plan was discussed with the patient and she voiced her understanding and agreement.    Return visit: Return in about 6 months (around 2/11/2021).    Nakul Hill MD    Chief Complaint   Ms. Wagner is a 29 year old here for kidney transplant and immunosuppression management.     History of Present Illness    Ms. Wagner reports feeling good overall with some medical complaints.  She does report some abdominal pain that start about 4 days ago.  The pain was located over her allograft and woke her up at 5 am.  She describes it as an achy, twisting pain of 3-4 out of 10 and would last about 30 seconds or less.   The pain was associated with the urge to have a bowel movement, although she doesn't always have to go.  The pain was associated with decreased appetite and fatigue, as well as diarrhea when she did have a bowel movement.  In addition, she has some nausea over the weekend.  She had a second episode at 2 pm that same day, which occurred this time after urinating.  No pain or burning with urination.  This twisting pain was ~ 5 out of 10 and lasted 30-60 seconds.  It was bad enough it prevented her from walking.  The next day she had another more brief episode lasting ~ 10-15  seconds.  No episodes since that time.  No further GI symptoms, including no diarrhea.    Her energy level is good and pretty much normal, outside of the few days this last weekend.  She is active and gets some exercise.  Denies any chest pain or shortness of breath with exertion.  Appetite is good and weight is stable.  No fever, sweats or chills.  No leg swelling.    Recent Hospitalizations:  [x] No [] Yes    New Medical Issues: [] No [x] Yes See above   Decreased energy: [x] No [] Yes Improved   Chest pain or SOB with exertion:  [x] No [] Yes    Appetite change or weight change: [x] No [] Yes    Nausea, vomiting or diarrhea:  [x] No [] Yes    Fever, sweats or chills: [x] No [] Yes    Leg swelling: [x] No [] Yes      Home BP: 100-110/70s.  No lightheadedness.    Review of Systems   A comprehensive review of systems was obtained and negative, except as noted in the HPI or PMH.    Problem List   Patient Active Problem List   Diagnosis     DVT of upper extremity (deep vein thrombosis) (H)     Seizure disorder (H)     Galactorrhea     Acquired hypothyroidism     Increased prolactin level     Hypomagnesemia     Infective endocarditis-history of.  1/2019.  resolved.      Aftercare following organ transplant     Immunosuppression (H)     Methemoglobinemia     Kidney replaced by transplant     Anxiety     Secondary renal hyperparathyroidism (H)     Vitamin D deficiency     CKD (chronic kidney disease) stage 3, GFR 30-59 ml/min (H)     Stricture or kinking of ureter     Pyelonephritis     Diarrhea     Bicornate uterus     Normal Pap 3/2020.  repeat 3/2023     Prophylactic antibiotic     Hydronephrosis     Hydronephrosis of kidney transplant       Social History   Social History     Tobacco Use     Smoking status: Never Smoker     Smokeless tobacco: Never Used   Substance Use Topics     Alcohol use: No     Alcohol/week: 0.0 standard drinks     Drug use: No       Allergies   Allergies   Allergen Reactions     Contrast Dye  Rash     CT contrast allergy 12/14/19 rash over eyes. Need to have pre medication before a CT WITH CONTRAST      Chlorhexidine Rash     Rash at site     Sulfa Drugs Rash     Muscle stiffness of neck     Dapsone      Methemoglobinemia     Furosemide Other (See Comments)     Skin flushing     Lisinopril Swelling     angioedema     Metrogel [Metronidazole]      Hives diffusely on body after vaginal administration.     Cefuroxime Rash       Medications   Current Outpatient Medications   Medication Sig     acetaminophen (TYLENOL) 325 MG tablet Take 2 tablets (650 mg) by mouth every 4 hours as needed for mild pain     aspirin (ASA) 81 MG chewable tablet CHEW AND SWALLOW 1 TABLET DAILY     atorvastatin (LIPITOR) 10 MG tablet Take 1 tablet (10 mg) by mouth daily     cinacalcet (SENSIPAR) 30 MG tablet Take 1 tablet (30 mg) by mouth daily     diphenhydrAMINE (BENADRYL) 25 MG capsule Take 1-2 tablets by mouth every 6 hours PRN itching/allergies     EPINEPHrine (ANY BX GENERIC EQUIV) 0.3 MG/0.3ML injection 2-pack Inject 0.3 mLs (0.3 mg) into the muscle as needed for anaphylaxis     fludrocortisone (FLORINEF) 0.1 MG tablet Take 1 tablet (0.1 mg) by mouth Every Mon, Wed, Fri Morning     hydrOXYzine (ATARAX) 10 MG tablet Take 1 tablet (10 mg) by mouth 3 times daily as needed for anxiety     lactobacillus rhamnosus, GG, (CULTURELL) capsule Take 1 capsule by mouth daily     levothyroxine (SYNTHROID/LEVOTHROID) 25 MCG tablet Take 1 tablet (25 mcg) Tuesday, Thursday, Saturday and Sunday and 1.5 tablets (37.5 mcg) on Monday, Wednesday and Friday every week.     magnesium oxide (MAG-OX) 400 MG tablet Take 1 tablet (400 mg) by mouth daily     MYFORTIC (BRAND) 180 MG EC tablet Take 3 tablets (540 mg) by mouth 2 times daily     norethindrone (MICRONOR) 0.35 MG tablet Do not restart until your follow up appointment with your surgeon. Discuss restart date at that appointment.     psyllium (METAMUCIL/KONSYL) capsule Take 1 capsule by mouth  daily (Patient taking differently: Take 1 capsule by mouth daily as needed (loose stools) )     simethicone (MYLICON) 125 MG chewable tablet Take 1 tablet (125 mg) by mouth 4 times daily as needed for intestinal gas     sodium bicarbonate 650 MG tablet TAKE 3 TABLETS(1950 MG) BY MOUTH TWICE DAILY     cephALEXin (KEFLEX) 500 MG capsule Take 1 capsule (500 mg) by mouth 3 times daily for 7 days     oxybutynin (DITROPAN) 5 MG tablet Take 1 tablet (5 mg) by mouth 3 times daily as needed for bladder spasms     oxybutynin (DITROPAN) 5 MG tablet Take 1 tablet (5 mg) by mouth 3 times daily as needed for bladder spasms     oxyCODONE (ROXICODONE) 5 MG tablet Take 1-2 tablets (5-10 mg) by mouth every 4 hours as needed for moderate to severe pain     phenazopyridine (PYRIDIUM) 200 MG tablet Take 1 tablet (200 mg) by mouth 3 times daily as needed for irritation     senna-docusate (SENOKOT-S/PERICOLACE) 8.6-50 MG tablet Take 1-2 tablets by mouth 2 times daily     tacrolimus (GENERIC EQUIVALENT) 0.5 MG capsule HOLD     tacrolimus (GENERIC EQUIVALENT) 1 MG capsule Take 3 capsules (3 mg) by mouth 2 times daily     tamsulosin (FLOMAX) 0.4 MG capsule Take 1 capsule (0.4 mg) by mouth daily     tolterodine (DETROL) 2 MG tablet Take 1 tablet (2 mg) by mouth every 12 hours as needed for incontinence (Spasms)     No current facility-administered medications for this visit.      There are no discontinued medications.     Physical Exam  Vital signs were deferred for this telemedicine visit.    GENERAL APPEARANCE: alert and no distress  HENT: no obvious abnormalities on appearance  RESP: breathing appears unremarkable with normal rate, no audible wheezing or cough and no apparent shortness of breath with conversation  MS: extremities normal - no gross deformities noted  SKIN: no apparent rash and normal skin tone  NEURO: speech is clear with no obvious neurological deficits  PSYCH: mentation appears normal and affect normal    Data     Renal  Latest Ref Rng & Units 8/18/2020 8/15/2020 8/11/2020   Na 133 - 144 mmol/L 138 140 138   Na (external) 136 - 145 mmol/L - - -   K 3.4 - 5.3 mmol/L 4.6 4.4 4.4   K (external) 3.5 - 5.0 mmol/L - - -   Cl 94 - 109 mmol/L 110(H) 111(H) 108   Cl (external) 98 - 107 mmol/L - - -   CO2 20 - 32 mmol/L 22 24 25   CO2 (external) 22 - 31 mmol/L - - -   BUN 7 - 30 mg/dL 26 31(H) 32(H)   BUN (external) 8 - 22 mg/dL - - -   Cr 0.52 - 1.04 mg/dL 1.07(H) 1.01 1.27(H)   Cr (external) 0.60 - 1 mg/dL - - -   Glucose 70 - 99 mg/dL 92 99 87   Glucose (external) 70 - 125 mg/dL - - -   Ca  8.5 - 10.1 mg/dL 9.7 9.1 9.6   Ca (external) 8.5 - 10.5 mg/dL - - -   Mg 1.6 - 2.3 mg/dL - 1.7 -   Mg (external) 1.8 - 2.6 mg/dL - - -     Bone Health Latest Ref Rng & Units 8/15/2020 8/4/2020 4/29/2020   Phos 2.5 - 4.5 mg/dL 3.2 - 2.7   Phos (external) 2.5 - 4.5 mg/dL - - -   PTHi 18 - 80 pg/mL - 229(H) -   PTHi (external) 18 - 80 pg/mL - - -   Vit D Def 20 - 75 ug/L - 14(L) -     Heme Latest Ref Rng & Units 8/18/2020 8/15/2020 8/11/2020   WBC 4.0 - 11.0 10e9/L 5.9 5.7 5.6   WBC (external) 4.0 - 11.0 thou/uL - - -   Hgb 11.7 - 15.7 g/dL 11.8 11.0(L) 12.2   Hgb (external) 12.0 - 16.0 g/dL - - -   Plt 150 - 450 10e9/L 229 204 205   Plt (external) 140 - 440 thou/uL - - -   ABSOLUTE NEUTROPHIL 1.6 - 8.3 10e9/L - - -   ABSOLUTE NEUTROPHILS (EXTERNAL) 2.0 - 7.7 thou/uL - - -   ABSOLUTE LYMPHOCYTES 0.8 - 5.3 10e9/L - - -   ABSOLUTE LYMPHOCYTES (EXTERNAL) 0.8 - 4.4 thou/uL - - -   ABSOLUTE MONOCYTES 0.0 - 1.3 10e9/L - - -   ABSOLUTE MONOCYTES (EXTERNAL) 0.0 - 0.9 thou/uL - - -   ABSOLUTE EOSINOPHILS 0.0 - 0.7 10e9/L - - -   ABSOLUTE EOSINOPHILS (EXTERNAL) 0.0 - 0.4 thou/uL - - -   ABSOLUTE BASOPHILS 0.0 - 0.2 10e9/L - - -   ABSOLUTE BASOPHILS (EXTERNAL) 0.0 - 0.2 thou/uL - - -   ABS IMMATURE GRANULOCYTES 0 - 0.4 10e9/L - - -   ABSOLUTE NUCLEATED RBC - - - -     Liver Latest Ref Rng & Units 8/4/2020 2/3/2020 1/23/2020   AP 40 - 150 U/L 185(H) 126 210(H)   AP  (external) 34 - 104 U/L - - -   TBili 0.2 - 1.3 mg/dL 0.7 0.6 1.2   DBili 0.0 - 0.2 mg/dL <0.1 0.1 -   ALT 0 - 50 U/L 19 20 20   ALT (external) 7 - 52 U/L - - -   AST 0 - 45 U/L 11 13 9   AST (external) 13 - 39 U/L - - -   Tot Protein 6.8 - 8.8 g/dL 7.9 7.4 8.1   Tot Protein (external) 6.4 - 8.9 g/dL - - -   Albumin 3.4 - 5.0 g/dL 4.0 3.7 4.5   Albumin (external) - - - -     Pancreas Latest Ref Rng & Units 8/4/2020 2/3/2020 8/3/2019   A1C 0 - 5.6 % 5.5 5.7(H) 4.8     Iron studies Latest Ref Rng & Units 8/4/2020 2/17/2020 12/3/2019   Iron 35 - 180 ug/dL 64 95 47   Iron sat 15 - 46 % 32 42 23   Ferritin 12 - 150 ng/mL 1,065(H) 1,139(H) 1,003(H)   Ferritin (external) 10 - 291 ng/mL - - -     [facility] Txp Virology Latest Ref Rng & Units 8/4/2020 7/8/2020 6/1/2020   CVM DNA Quant - - - -   CMV QUANT IU/ML CMVND:CMV DNA Not Detected [IU]/mL - - -   LOG IU/ML OF CMVQNT <2.1 [Log:IU]/mL - - -   BK Spec - Plasma Plasma, EDTA anticoagulant EDTA PLASMA   BK Res BKNEG:BK Virus DNA Not Detected copies/mL BK Virus DNA Not Detected BK Virus DNA Not Detected BK Virus DNA Not Detected   BK Log <2.7 Log copies/mL Not Calculated Not Calculated Not Calculated   EBV VCA IGG ANTIBODY U/mL - - -   EBV CAPSID ANTIBODY IGG 0.0 - 0.8 AI - - -   EBV DNA COPIES/ML EBVNEG:EBV DNA Not Detected [Copies]/mL - - -   EBV DNA LOG OF COPIES <2.7 [Log:copies]/mL - - -   Hep B Surf - - - -        Recent Labs   Lab Test 08/07/20  0915 08/11/20  0704 08/18/20  0940   DOSTAC 08/06/20 2100PM 08/10/2020 9PM 08/17/20 2120   TACROL 7.6 9.8 8.0     Recent Labs   Lab Test 08/16/19  0807 09/03/19  0944   DOSMPA Not Provided 0840pm   MPACID 1.92 3.39   MPAG 149.2* 52.8       Again, thank you for allowing me to participate in the care of your patient.      Sincerely,    Kidney/Pancreas Recipient

## 2020-08-12 LAB
BACTERIA SPEC CULT: ABNORMAL
SPECIMEN SOURCE: ABNORMAL

## 2020-08-14 ENCOUNTER — TELEPHONE (OUTPATIENT)
Dept: TRANSPLANT | Facility: CLINIC | Age: 30
End: 2020-08-14

## 2020-08-14 ENCOUNTER — HOSPITAL ENCOUNTER (OUTPATIENT)
Facility: CLINIC | Age: 30
Setting detail: OBSERVATION
Discharge: HOME | End: 2020-08-15
Attending: SURGERY | Admitting: SURGERY
Payer: MEDICARE

## 2020-08-14 PROBLEM — N13.30 HYDRONEPHROSIS: Status: ACTIVE | Noted: 2020-08-14

## 2020-08-14 PROCEDURE — 12000026 ZZH R&B TRANSPLANT

## 2020-08-14 RX ORDER — SODIUM BICARBONATE 650 MG/1
1950 TABLET ORAL 2 TIMES DAILY
Status: DISCONTINUED | OUTPATIENT
Start: 2020-08-14 | End: 2020-08-15 | Stop reason: HOSPADM

## 2020-08-14 RX ORDER — ATORVASTATIN CALCIUM 10 MG/1
10 TABLET, FILM COATED ORAL DAILY
Status: DISCONTINUED | OUTPATIENT
Start: 2020-08-15 | End: 2020-08-15 | Stop reason: HOSPADM

## 2020-08-14 RX ORDER — LIDOCAINE 40 MG/G
CREAM TOPICAL
Status: DISCONTINUED | OUTPATIENT
Start: 2020-08-14 | End: 2020-08-15 | Stop reason: HOSPADM

## 2020-08-14 RX ORDER — ACETAMINOPHEN 325 MG/1
650 TABLET ORAL EVERY 4 HOURS PRN
Status: DISCONTINUED | OUTPATIENT
Start: 2020-08-14 | End: 2020-08-15 | Stop reason: HOSPADM

## 2020-08-14 RX ORDER — CINACALCET 30 MG/1
30 TABLET, FILM COATED ORAL DAILY
Status: DISCONTINUED | OUTPATIENT
Start: 2020-08-15 | End: 2020-08-15 | Stop reason: HOSPADM

## 2020-08-14 RX ORDER — SIMETHICONE 125 MG
125 TABLET,CHEWABLE ORAL 4 TIMES DAILY PRN
Status: DISCONTINUED | OUTPATIENT
Start: 2020-08-14 | End: 2020-08-15 | Stop reason: HOSPADM

## 2020-08-14 RX ORDER — TACROLIMUS 0.5 MG/1
0.5 CAPSULE ORAL 2 TIMES DAILY
Status: DISCONTINUED | OUTPATIENT
Start: 2020-08-14 | End: 2020-08-14 | Stop reason: DRUGHIGH

## 2020-08-14 RX ORDER — TACROLIMUS 1 MG/1
3 CAPSULE ORAL 2 TIMES DAILY
Status: DISCONTINUED | OUTPATIENT
Start: 2020-08-14 | End: 2020-08-14 | Stop reason: DRUGHIGH

## 2020-08-14 RX ORDER — ONDANSETRON 2 MG/ML
4 INJECTION INTRAMUSCULAR; INTRAVENOUS EVERY 6 HOURS PRN
Status: DISCONTINUED | OUTPATIENT
Start: 2020-08-14 | End: 2020-08-15 | Stop reason: HOSPADM

## 2020-08-14 RX ORDER — ONDANSETRON 4 MG/1
4 TABLET, ORALLY DISINTEGRATING ORAL EVERY 6 HOURS PRN
Status: DISCONTINUED | OUTPATIENT
Start: 2020-08-14 | End: 2020-08-15 | Stop reason: HOSPADM

## 2020-08-14 RX ORDER — FLUDROCORTISONE ACETATE 0.1 MG/1
0.1 TABLET ORAL
Status: DISCONTINUED | OUTPATIENT
Start: 2020-08-17 | End: 2020-08-15 | Stop reason: HOSPADM

## 2020-08-14 RX ORDER — LEVOTHYROXINE SODIUM 25 UG/1
25 TABLET ORAL DAILY
Status: DISCONTINUED | OUTPATIENT
Start: 2020-08-15 | End: 2020-08-15 | Stop reason: HOSPADM

## 2020-08-14 RX ORDER — MYCOPHENOLIC ACID 180 MG/1
540 TABLET, DELAYED RELEASE ORAL 2 TIMES DAILY
Status: DISCONTINUED | OUTPATIENT
Start: 2020-08-14 | End: 2020-08-15 | Stop reason: HOSPADM

## 2020-08-14 RX ORDER — HYDROXYZINE HYDROCHLORIDE 10 MG/1
10 TABLET, FILM COATED ORAL 3 TIMES DAILY PRN
Status: DISCONTINUED | OUTPATIENT
Start: 2020-08-14 | End: 2020-08-15 | Stop reason: HOSPADM

## 2020-08-14 RX ORDER — NALOXONE HYDROCHLORIDE 0.4 MG/ML
.1-.4 INJECTION, SOLUTION INTRAMUSCULAR; INTRAVENOUS; SUBCUTANEOUS
Status: DISCONTINUED | OUTPATIENT
Start: 2020-08-14 | End: 2020-08-15 | Stop reason: HOSPADM

## 2020-08-14 ASSESSMENT — ACTIVITIES OF DAILY LIVING (ADL)
RETIRED_COMMUNICATION: 0-->UNDERSTANDS/COMMUNICATES WITHOUT DIFFICULTY
TRANSFERRING: 0-->INDEPENDENT
AMBULATION: 0-->INDEPENDENT
BATHING: 0-->INDEPENDENT
COGNITION: 0 - NO COGNITION ISSUES REPORTED
SWALLOWING: 0-->SWALLOWS FOODS/LIQUIDS WITHOUT DIFFICULTY
DRESS: 0-->INDEPENDENT
FALL_HISTORY_WITHIN_LAST_SIX_MONTHS: NO
RETIRED_EATING: 0-->INDEPENDENT
TOILETING: 0-->INDEPENDENT

## 2020-08-14 NOTE — TELEPHONE ENCOUNTER
ISSUE:  Ongoing hydronephrosis    PLAN:  Per Johana Linda and Dr Johnson, admit pt for management of hydro  Request urology consult and stent placement    OUTCOME:   Spoke with pt, agreeable to admission  Spoke with pt placement, working on bed availability  Report given to inpatient NP and 7A charge nurse

## 2020-08-14 NOTE — TELEPHONE ENCOUNTER
Bed available on 7A after 8 pm    Call placed to pt, instructed her to go in to hospital for admission after 8 pm tonight. 7A phone number given for questions. Pt v/u.

## 2020-08-15 ENCOUNTER — PATIENT OUTREACH (OUTPATIENT)
Dept: CARE COORDINATION | Facility: CLINIC | Age: 30
End: 2020-08-15

## 2020-08-15 ENCOUNTER — APPOINTMENT (OUTPATIENT)
Dept: CT IMAGING | Facility: CLINIC | Age: 30
End: 2020-08-15
Attending: SURGERY
Payer: MEDICARE

## 2020-08-15 VITALS
SYSTOLIC BLOOD PRESSURE: 95 MMHG | TEMPERATURE: 98.2 F | HEART RATE: 67 BPM | RESPIRATION RATE: 16 BRPM | OXYGEN SATURATION: 98 % | DIASTOLIC BLOOD PRESSURE: 62 MMHG

## 2020-08-15 PROBLEM — N13.30 HYDRONEPHROSIS OF KIDNEY TRANSPLANT: Status: ACTIVE | Noted: 2020-08-15

## 2020-08-15 PROBLEM — T86.19 HYDRONEPHROSIS OF KIDNEY TRANSPLANT: Status: ACTIVE | Noted: 2020-08-15

## 2020-08-15 LAB
ALBUMIN UR-MCNC: NEGATIVE MG/DL
ANION GAP SERPL CALCULATED.3IONS-SCNC: 6 MMOL/L (ref 3–14)
APPEARANCE UR: CLEAR
BILIRUB UR QL STRIP: NEGATIVE
BUN SERPL-MCNC: 31 MG/DL (ref 7–30)
CALCIUM SERPL-MCNC: 9.1 MG/DL (ref 8.5–10.1)
CHLORIDE SERPL-SCNC: 111 MMOL/L (ref 94–109)
CO2 SERPL-SCNC: 24 MMOL/L (ref 20–32)
COLOR UR AUTO: ABNORMAL
CREAT SERPL-MCNC: 1.01 MG/DL (ref 0.52–1.04)
ERYTHROCYTE [DISTWIDTH] IN BLOOD BY AUTOMATED COUNT: 13 % (ref 10–15)
GFR SERPL CREATININE-BSD FRML MDRD: 76 ML/MIN/{1.73_M2}
GLUCOSE SERPL-MCNC: 99 MG/DL (ref 70–99)
GLUCOSE UR STRIP-MCNC: NEGATIVE MG/DL
HCT VFR BLD AUTO: 36.6 % (ref 35–47)
HGB BLD-MCNC: 11 G/DL (ref 11.7–15.7)
HGB UR QL STRIP: NEGATIVE
KETONES UR STRIP-MCNC: NEGATIVE MG/DL
LEUKOCYTE ESTERASE UR QL STRIP: NEGATIVE
MAGNESIUM SERPL-MCNC: 1.7 MG/DL (ref 1.6–2.3)
MCH RBC QN AUTO: 27.7 PG (ref 26.5–33)
MCHC RBC AUTO-ENTMCNC: 30.1 G/DL (ref 31.5–36.5)
MCV RBC AUTO: 92 FL (ref 78–100)
NITRATE UR QL: NEGATIVE
PH UR STRIP: 7.5 PH (ref 5–7)
PHOSPHATE SERPL-MCNC: 3.2 MG/DL (ref 2.5–4.5)
PLATELET # BLD AUTO: 204 10E9/L (ref 150–450)
POTASSIUM SERPL-SCNC: 4.4 MMOL/L (ref 3.4–5.3)
RBC # BLD AUTO: 3.97 10E12/L (ref 3.8–5.2)
RBC #/AREA URNS AUTO: <1 /HPF (ref 0–2)
SARS-COV-2 RNA SPEC QL NAA+PROBE: NOT DETECTED
SODIUM SERPL-SCNC: 140 MMOL/L (ref 133–144)
SOURCE: ABNORMAL
SP GR UR STRIP: 1.01 (ref 1–1.03)
SPECIMEN SOURCE: NORMAL
SQUAMOUS #/AREA URNS AUTO: 1 /HPF (ref 0–1)
UROBILINOGEN UR STRIP-MCNC: NORMAL MG/DL (ref 0–2)
WBC # BLD AUTO: 5.7 10E9/L (ref 4–11)
WBC #/AREA URNS AUTO: 0 /HPF (ref 0–5)

## 2020-08-15 PROCEDURE — 25000132 ZZH RX MED GY IP 250 OP 250 PS 637: Mod: GY

## 2020-08-15 PROCEDURE — G0378 HOSPITAL OBSERVATION PER HR: HCPCS

## 2020-08-15 PROCEDURE — 80048 BASIC METABOLIC PNL TOTAL CA: CPT | Performed by: STUDENT IN AN ORGANIZED HEALTH CARE EDUCATION/TRAINING PROGRAM

## 2020-08-15 PROCEDURE — 25000132 ZZH RX MED GY IP 250 OP 250 PS 637: Mod: GY | Performed by: STUDENT IN AN ORGANIZED HEALTH CARE EDUCATION/TRAINING PROGRAM

## 2020-08-15 PROCEDURE — 96360 HYDRATION IV INFUSION INIT: CPT

## 2020-08-15 PROCEDURE — 87088 URINE BACTERIA CULTURE: CPT

## 2020-08-15 PROCEDURE — 25000131 ZZH RX MED GY IP 250 OP 636 PS 637: Mod: GY | Performed by: STUDENT IN AN ORGANIZED HEALTH CARE EDUCATION/TRAINING PROGRAM

## 2020-08-15 PROCEDURE — 84100 ASSAY OF PHOSPHORUS: CPT | Performed by: STUDENT IN AN ORGANIZED HEALTH CARE EDUCATION/TRAINING PROGRAM

## 2020-08-15 PROCEDURE — 74176 CT ABD & PELVIS W/O CONTRAST: CPT

## 2020-08-15 PROCEDURE — 81001 URINALYSIS AUTO W/SCOPE: CPT

## 2020-08-15 PROCEDURE — 85027 COMPLETE CBC AUTOMATED: CPT | Performed by: STUDENT IN AN ORGANIZED HEALTH CARE EDUCATION/TRAINING PROGRAM

## 2020-08-15 PROCEDURE — 25000131 ZZH RX MED GY IP 250 OP 636 PS 637: Mod: GY | Performed by: SURGERY

## 2020-08-15 PROCEDURE — 36415 COLL VENOUS BLD VENIPUNCTURE: CPT | Performed by: STUDENT IN AN ORGANIZED HEALTH CARE EDUCATION/TRAINING PROGRAM

## 2020-08-15 PROCEDURE — 40000556 ZZH STATISTIC PERIPHERAL IV START W US GUIDANCE

## 2020-08-15 PROCEDURE — 83735 ASSAY OF MAGNESIUM: CPT | Performed by: STUDENT IN AN ORGANIZED HEALTH CARE EDUCATION/TRAINING PROGRAM

## 2020-08-15 PROCEDURE — U0003 INFECTIOUS AGENT DETECTION BY NUCLEIC ACID (DNA OR RNA); SEVERE ACUTE RESPIRATORY SYNDROME CORONAVIRUS 2 (SARS-COV-2) (CORONAVIRUS DISEASE [COVID-19]), AMPLIFIED PROBE TECHNIQUE, MAKING USE OF HIGH THROUGHPUT TECHNOLOGIES AS DESCRIBED BY CMS-2020-01-R: HCPCS | Performed by: STUDENT IN AN ORGANIZED HEALTH CARE EDUCATION/TRAINING PROGRAM

## 2020-08-15 PROCEDURE — 25800025 ZZH RX 258: Performed by: STUDENT IN AN ORGANIZED HEALTH CARE EDUCATION/TRAINING PROGRAM

## 2020-08-15 PROCEDURE — 87086 URINE CULTURE/COLONY COUNT: CPT

## 2020-08-15 RX ORDER — MAGNESIUM OXIDE 400 MG/1
400 TABLET ORAL DAILY
Status: DISCONTINUED | OUTPATIENT
Start: 2020-08-15 | End: 2020-08-15 | Stop reason: HOSPADM

## 2020-08-15 RX ORDER — LACTOBACILLUS RHAMNOSUS GG 10B CELL
1 CAPSULE ORAL DAILY
Status: DISCONTINUED | OUTPATIENT
Start: 2020-08-15 | End: 2020-08-15 | Stop reason: HOSPADM

## 2020-08-15 RX ADMIN — MYCOPHENOLIC ACID 540 MG: 180 TABLET, DELAYED RELEASE ORAL at 07:48

## 2020-08-15 RX ADMIN — ATORVASTATIN CALCIUM 10 MG: 10 TABLET, FILM COATED ORAL at 07:48

## 2020-08-15 RX ADMIN — SODIUM BICARBONATE 650 MG TABLET 1950 MG: at 07:48

## 2020-08-15 RX ADMIN — LEVOTHYROXINE SODIUM 25 MCG: 0.03 TABLET ORAL at 07:48

## 2020-08-15 RX ADMIN — TACROLIMUS 3.5 MG: 1 CAPSULE ORAL at 07:48

## 2020-08-15 RX ADMIN — Medication 1 CAPSULE: at 11:17

## 2020-08-15 RX ADMIN — DEXTROSE AND SODIUM CHLORIDE: 5; 450 INJECTION, SOLUTION INTRAVENOUS at 05:11

## 2020-08-15 RX ADMIN — MAGNESIUM OXIDE 400 MG: 400 TABLET ORAL at 12:29

## 2020-08-15 ASSESSMENT — ACTIVITIES OF DAILY LIVING (ADL)
ADLS_ACUITY_SCORE: 11

## 2020-08-15 NOTE — PROGRESS NOTES
Transplant Surgery  Inpatient Daily Progress Note  08/15/2020    Assessment & Plan: 29 year old female s/p DDKT on 2019 who presents with hydronephrosis of her transplanted kidney. She had a previous episode of hydronephrosis in December which was treated with stent placement and subsequent removal in January. She was recently treated for a UTI 2 weeks ago. She had a recent renal US  which again demonstrated hydronephrosis.     Graft function: DDKT 2019. Baseline Cr 1.1-1.3. Admission Cr is 1.01. DSA on  was negative.   Immunosuppression management:   Tacrolimus 3.5mg BID last level on  was 9.8 Goal 8-10  Cellcept 540mg BID   Complexity of management:Medium. Contributing factors: Hydronephrosis  Hematology: Hbg 11 stable  Cardiorespiratory: Stable    GI/Nutrition: On regular diet. Make NPO at midnight for stent placement tomorrow. On home Culturrel for loose stools  Endocrine: No complications  Fluid/Electrolytes: MIVF discontinued due to PO intake  K: 4.4  Ma.7 - on home dose of Mag Ox  Phos: 3.2  : No brooks. Urology consulted for stent placement. Last US on  showed right lower quadrant renal transplant moderate to severe hydronephrosis does not change with voiding, similar in appearance to the previous study. Patent transplant vasculature without stenosis  CT scan on 8/15 showed moderate to severe hydronephrosis of the right lower quadrant  renal transplant with edema of the kidney and perinephric stranding present. The degree of distention has similar to recent ultrasound allowing for differences in modalities. No obstructing radiopaque calculi identified but there does appear to be an abrupt change in  caliber in the region of the ureterovesical junction.  Infectious disease: WBC today 11. UA/UC sent on 8/15.  Prophylaxis: DVT, fall, GI, fungal  Disposition: 7A     Medical Decision Making: Medium  Admit 04746 (moderate level decision making)    MAR/Fellow/Resident Provider: Martínez Sullivan  MD Delroy    Faculty: Antoinette Michel MD  _________________________________________________________________  Transplant History: Admitted 8/14/2020 for Hydronephrosis.  8/3/2019 (Kidney), Postoperative day: 378     Interval History: History is obtained from the patient  Overnight events: No acute events overnight. Patient denies any pain above trasnplanted kidney    ROS:   A 10-point review of systems was negative except as noted above.    Meds:    atorvastatin  10 mg Oral Daily     cinacalcet  30 mg Oral Daily     [START ON 8/17/2020] fludrocortisone  0.1 mg Oral Q Mon Wed Fri AM     levothyroxine  25 mcg Oral Daily     mycophenolic acid  540 mg Oral BID     sodium bicarbonate  1,950 mg Oral BID     sodium chloride (PF)  3 mL Intracatheter Q8H     tacrolimus  3.5 mg Oral BID       Physical Exam:     Admit      Current vitals:   BP 95/62 (BP Location: Right arm)   Pulse 67   Temp 98.2  F (36.8  C) (Oral)   Resp 16   SpO2 98%          Vital sign ranges:    Temp:  [98.1  F (36.7  C)-98.6  F (37  C)] 98.2  F (36.8  C)  Pulse:  [64-94] 67  Resp:  [16-18] 16  BP: ()/(50-74) 95/62  SpO2:  [96 %-99 %] 98 %  Patient Vitals for the past 24 hrs:   BP Temp Temp src Pulse Resp SpO2   08/15/20 0733 95/62 98.2  F (36.8  C) Oral 67 16 98 %   08/15/20 0410 97/73 98.6  F (37  C) Oral 64 16 98 %   08/15/20 0004 113/74 98.6  F (37  C) Oral 73 16 99 %   08/14/20 2129 109/50 98.1  F (36.7  C) Oral 94 18 96 %     General Appearance: in no apparent distress.   Skin: normal  Heart: regular rate and rhythm  Lungs: Non labored breathing on RA  Abdomen: The abdomen is rounded, and  non-tender. she is not tender over the kidney graft. The previous incision is well healed  : brooks is not present.  The genitalia is not edematous. Urine has no gross hematuria.   Extremities: edema: absent.   Neurologic: awake, alert and oriented. Tremor absent..     Data:   CMP  Recent Labs   Lab 08/15/20  0653 08/11/20  0704    138    POTASSIUM 4.4 4.4   CHLORIDE 111* 108   CO2 24 25   GLC 99 87   BUN 31* 32*   CR 1.01 1.27*   GFRESTIMATED 76 58*   GFRESTBLACK 88 67   SHAMIR 9.1 9.6   MAG 1.7  --    PHOS 3.2  --      CBC  Recent Labs   Lab 08/15/20  0653 08/11/20  0704   HGB 11.0* 12.2   WBC 5.7 5.6    205     COAGSNo lab results found in last 7 days.    Invalid input(s): XA   Urinalysis  Recent Labs   Lab Test 08/11/20  0540 08/04/20  0905  04/29/20  0850   COLOR Yellow Light Yellow   < >  --    APPEARANCE Clear Clear   < >  --    URINEGLC Negative Negative   < >  --    URINEBILI Negative Negative   < >  --    URINEKETONE Negative Negative   < >  --    SG 1.018 1.009   < >  --    UBLD Negative Negative   < >  --    URINEPH 5.0 6.5   < >  --    PROTEIN Negative Negative   < >  --    NITRITE Negative Negative   < >  --    LEUKEST Negative Negative   < >  --    RBCU 1 <1   < >  --    WBCU 2 1   < >  --    UTPG  --  Unable to calculate due to low value  --  Unable to calculate due to low value    < > = values in this interval not displayed.     Virology:  CMV DNA Quantitation Specimen   Date Value Ref Range Status   01/23/2020 EDTA PLASMA  Final     CMV IgG Antibody   Date Value Ref Range Status   12/26/2013 >10.00  Positive for anti-CMV IgG U/mL Final     EBV VCA IgG Antibody   Date Value Ref Range Status   12/26/2013 57.80 U/mL Final     Comment:     Positive, suggests immunologic exposure.     Hepatitis C Antibody   Date Value Ref Range Status   08/02/2019 Nonreactive NR^Nonreactive Final     Comment:     Assay performance characteristics have not been established for newborns,   infants, and children       Hep B Surface Noemy   Date Value Ref Range Status   12/12/2013 34.4  Final     Comment:     Reactive, Patient is considered to be immune to infection with hepatitis B   when   the value is greater than or equal to 12.0 mlU/mL.     BK Virus Result   Date Value Ref Range Status   08/04/2020 BK Virus DNA Not Detected BKNEG^BK Virus DNA Not  Detected copies/mL Final   07/08/2020 BK Virus DNA Not Detected BKNEG^BK Virus DNA Not Detected copies/mL Final   06/01/2020 BK Virus DNA Not Detected BKNEG^BK Virus DNA Not Detected copies/mL Final   04/29/2020 BK Virus DNA Not Detected BKNEG^BK Virus DNA Not Detected copies/mL Final   04/06/2020 BK Virus DNA Not Detected BKNEG^BK Virus DNA Not Detected copies/mL Final   03/02/2020 BK Virus DNA Not Detected BKNEG^BK Virus DNA Not Detected copies/mL Final   02/03/2020 BK Virus DNA Not Detected BKNEG^BK Virus DNA Not Detected copies/mL Final   01/06/2020 BK Virus DNA Not Detected BKNEG^BK Virus DNA Not Detected copies/mL Final   12/03/2019 BK Virus DNA Not Detected BKNEG^BK Virus DNA Not Detected copies/mL Final   12/02/2019 BK Virus DNA Not Detected BKNEG^BK Virus DNA Not Detected copies/mL Final   11/04/2019 BK Virus DNA Not Detected BKNEG^BK Virus DNA Not Detected copies/mL Final   10/07/2019 BK Virus DNA Not Detected BKNEG^BK Virus DNA Not Detected copies/mL Final   09/17/2019 BK Virus DNA Not Detected BKNEG^BK Virus DNA Not Detected copies/mL Final   09/03/2019 BK Virus DNA Not Detected BKNEG^BK Virus DNA Not Detected copies/mL Final     Martínez Potts MD  PGY1 Transplant Surgery

## 2020-08-15 NOTE — PLAN OF CARE
1872 - 1566    30 y/o F w/hx of DDKT in 2019 & hydronephrosis (treated w/stent which has been removed); admitted w/ recurrent hydronephrosis of transplanted kidney.     Vitals: Temp: 98.6  F (37  C) Temp src: Oral BP: 97/73 Pulse: 64   Resp: 16 SpO2: 98 % O2 Device: None (Room air)    Pain/Nausea: Denies   Diet: NPO since midnight   BG orders: none   LDA: RPIV w/D5 1/2NS @100mL/hr  GI: WDL  : WDL  Skin: WDL   Neuro: A&Ox4  Mobility: Independent  Plan: Urology consult, possible stent placement.

## 2020-08-15 NOTE — PLAN OF CARE
"  AVSS and denies pain.  Patient up independently in room, voiding not saving (sent for UA/UC), passing flatus, reporting bowel movements, and tolerating diet with good appetite.  Patient had CT of abdomen \"Moderate to severe hydronephrosis of the right lower quadrant renal transplant with edema of the kidney and perinephric stranding present\".  Patient made Observation status.  Patient yet to be seen by urology.  Scheduled meds given as ordered.  Continue to monitor, treat as ordered, notify team with changes.        Patient was seen by urology at 11 AM. Thanks    Rosanna Whittington MD  PGY2 Urology  "

## 2020-08-15 NOTE — PLAN OF CARE
/50   Pulse 94   Temp 98.1  F (36.7  C) (Oral)   Resp 18   SpO2 96%     Pt was admitted from home and arrived on unit @2130. Meds were sent to security, pt was oriented to unit. Seen by MD, no orders for PIV or fluids yet. Pt to be NPO at midnight for possible procedure in am. Pt aware.   Neuro: A/Ox4  Pain/Nausea: denies  Cardiac: WDL  Resp: WDL  GI/: WDL  Diet/Appetite: regular diet, NPO at midnight  Skin: WDL  Access: no PIV yet  Activity: up ad aron, steady on feet, no hx of falls  Procedures: urology consult, possible stent placement tomorrow  Plan:   Will continue with plan of care and notify team of any changes.?    Delicia Parks RN on 8/14/2020 at 10:49 PM

## 2020-08-15 NOTE — PLAN OF CARE
OBSERVATION GOALS  Patient tolerating regular diet:  tolerating regular diet; met  VSS:  AVSS; met  Stent placement per urology:  awaiting urology consult; NOT met

## 2020-08-15 NOTE — CONSULTS
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 She has had transplant hydronephrosis in the past, previously treated with stent placement 12/6/2019. No stricture or filling defects seen at time of stent placement. Her hydronephrosis resolved with stent in place, stent was removed in January 31 2020. Ms. Wagner also has a history of pyelonephritis (50-100K E faecalis, developed while stent in situ) and three UTIs since then (>100k klebsiella in February, >100K e coli in May, and 10-50K GBS in July), resolved with antibiotics.     She recently had a OB/GYN appointment for abnormal bleeding, was diagnosed with BV and treated with resolution of bleeding. However, ultrasound done at this appointment (8/5) revealed moderate hydronephrosis. Ms. Wagner reports that between 8/7 - 8/10 she was experiencing pain over her transplant kidney. On one episode of voiding, she felt pain radiate up to the kidney from her bladder. She had some nausea on 8/8 but denies any fever, chills, vomiting, shortness of breath. She did have some constipation at that time and wonders if straining to have a bowel movement worsened her symptoms. She then had follow up ultrasound 8/11 with persistent hydronephrosis, so transplant team recommended direct admission to arrange stent placement. Apparently a urology visit could not be arranged outpatient although no communication documented in [facility].    Today, patient is feeling totally well. She has no abdominal pain, nausea, vomiting, fever, chills. She denies  any urinary symptoms including dysuria, hematuria, trouble emptying her bladder. When she has had UTIs in the past she does have these symptoms. She understands she has some hydro that the transplant team is worried about.         Past Medical History:     Past Medical History:   Diagnosis Date     Anemia in chronic kidney disease      Dialysis patient (H)      End stage renal disease on dialysis (H)      Endocarditis      History of DVT (deep vein thrombosis)      History of seizure      Hypertension      Hypothyroid      Kidney transplanted 2019    DCD DDKT. Induction with thymo 6mg/kg.     Pituitary adenoma (H)      Pyelonephritis of transplanted kidney 2020            Past Surgical History:     Past Surgical History:   Procedure Laterality Date     BENCH KIDNEY N/A 8/3/2019    Procedure: BACKBENCH PREPARATION, ALLOGRAFT, KIDNEY;  Surgeon: Dorian Johnson MD;  [facility]: [facility]     COMBINED CYSTOSCOPY, RETROGRADES, URETEROSCOPY, LASER HOLMIUM LITHOTRIPSY URETER(S), INSERT STENT N/A 2019    Procedure: CYSTOURETEROSCOPY, WITH RETROGRADE PYELOGRAM of transplant kidney, STENT INSERTION, Laser on standby;  Surgeon: Wally Britt MD;  [facility]     CREATE FISTULA ARTERIOVENOUS UPPER EXTREMITY  2014    Procedure: CREATE FISTULA ARTERIOVENOUS UPPER EXTREMITY;  Left Wrist Arteriovenous Fistula Placement;  Surgeon: Shashi Castro MD;  Location: [facility] OR     PERCUTANEOUS BIOPSY KIDNEY Right 2019    Procedure: Right Kidney Biopsy;  Surgeon: Deny Rios MD;  [facility]     RASTA/DIALYSIS CATHETER  12/10/2013          TRANSPLANT KIDNEY RECIPIENT  DONOR N/A 8/3/2019    Procedure: TRANSPLANT, KIDNEY, RECIPIENT,  DONOR with Ureteral Stent Placement;  Surgeon: Dorian Johnson MD;  Location: [facility] OR            Social History:     Social History     Tobacco Use     Smoking status: Never Smoker     Smokeless tobacco: Never Used   Substance Use Topics      Alcohol use: No     Alcohol/week: 0.0 standard drinks            Family History:     Family History   Problem Relation Age of Onset     Hypertension Sister      Diabetes Sister      Cerebrovascular Disease Sister      Kidney Disease Mother      Kidney Disease Sister      Cerebrovascular Disease Father      Coronary Artery Disease No family hx of      Breast Cancer No family hx of      Cancer - colorectal No family hx of      Ovarian Cancer No family hx of      Prostate Cancer No family hx of      Other Cancer No family hx of      Asthma No family hx of      Anesthesia Reaction No family hx of      Deep Vein Thrombosis (DVT) No family hx of             Allergies:     Allergies   Allergen Reactions     Contrast Dye Rash     CT contrast allergy 12/14/19 rash over eyes. Need to have pre medication before a CT WITH CONTRAST      Chlorhexidine Rash     Rash at site     Sulfa Drugs Rash     Muscle stiffness of neck     Dapsone      Methemoglobinemia     Furosemide Other (See Comments)     Skin flushing     Lisinopril Swelling     angioedema     Metrogel [Metronidazole]      Hives diffusely on body after vaginal administration.            Medications:     Current Facility-Administered Medications   Medication     acetaminophen (TYLENOL) tablet 650 mg     acetaminophen (TYLENOL) tablet 650 mg     atorvastatin (LIPITOR) tablet 10 mg     cinacalcet (SENSIPAR) tablet 30 mg     dextrose 5% and 0.45% NaCl infusion     [START ON 8/17/2020] fludrocortisone (FLORINEF) tablet 0.1 mg     hydrOXYzine (ATARAX) tablet 10 mg     lactobacillus rhamnosus (GG) (CULTURELL) capsule 1 capsule     levothyroxine (SYNTHROID/LEVOTHROID) tablet 25 mcg     lidocaine (LMX4) cream     lidocaine 1 % 0.1-1 mL     mycophenolic acid (MYFORTIC BRAND) EC tablet 540 mg     naloxone (NARCAN) injection 0.1-0.4 mg     ondansetron (ZOFRAN-ODT) ODT tab 4 mg    Or     ondansetron (ZOFRAN) injection 4 mg     psyllium (METAMUCIL/KONSYL) capsule 1 capsule      simethicone (MYLICON) chewable tablet 125 mg     sodium bicarbonate tablet 1,950 mg     sodium chloride (PF) 0.9% PF flush 3 mL     sodium chloride (PF) 0.9% PF flush 3 mL     tacrolimus (GENERIC EQUIVALENT) capsule 3.5 mg             Review of Systems:    ROS: 10 point ROS neg other than the symptoms noted above in the HPI           Physical Exam:   VS:  T: 98.2    HR: 67    BP: 95/62    RR: 16   GEN:  AOx3.  NAD.    CV:  RRR  LUNGS: Non-labored breathing.   BACK:  No midline or CVA tenderness.  ABD:  Soft.  NT.  ND.  No rebound or guarding.  No masses.  EXT:  Warm, well perfused.  No pitting edema.  SKIN:  Warm.  Dry.  No rashes.  NEURO:  CN grossly intact.            Data:   All laboratory data reviewed:    Recent Labs   Lab 08/15/20  0653 08/11/20  0704   WBC 5.7 5.6   HGB 11.0* 12.2    205     Recent Labs   Lab 08/15/20  0653 08/11/20  0704    138   POTASSIUM 4.4 4.4   CHLORIDE 111* 108   CO2 24 25   BUN 31* 32*   CR 1.01 1.27*   GLC 99 87   SHAMIR 9.1 9.6   MAG 1.7  --    PHOS 3.2  --      Recent Labs   Lab 08/11/20  0540   COLOR Yellow   APPEARANCE Clear   URINEGLC Negative   URINEBILI Negative   URINEKETONE Negative   SG 1.018   URINEPH 5.0   PROTEIN Negative   NITRITE Negative   LEUKEST Negative   RBCU 1   WBCU 2     All pertinent imaging reviewed:    CT scan of the abdomen:  8/*15                                                                  Kidneys:   The native kidneys are markedly atrophic, slightly low-lying on the  left. No hydronephrosis.     Right lower quadrant renal transplant is enlarged and edematous with  moderate to severe hydronephrosis demonstrated as seen on recent  ultrasound. Mild perinephric stranding present. No renal calculi  visualized. There appears to be an abrupt change in caliber of the  collecting system at the level of the ureteroPELVIC junction, coronal  series 3 image 49 with no significant distention of the ureter beyond  this level. Evaluation for a stricture  or focal urothelial lesion at  this site is limited especially on noncontrast exam. Ureteral  implantation site on the anterior bladder noted. The stent present on  prior CT has been removed.    IMPRESSION:   1.  Moderate to severe hydronephrosis of the right lower quadrant  renal transplant with edema of the kidney and perinephric stranding  present. The degree of distention has similar to recent ultrasound  allowing for differences in modalities. No obstructing radiopaque  calculi identified but there does appear to be an abrupt change in  caliber in the region of the ureterovesical junction. Evaluation for  stricture or focal urothelial lesion in this region is not well  performed on this exam.   2.  Native kidneys are atrophic.     Renal ultrasound:    8/11 IMPRESSION:   1. Right lower quadrant renal transplant moderate to severe  hydronephrosis does not change with voiding, similar in appearance to  the previous study.     2. Patent transplant vasculature without stenosis.            Impression and Plan:     Impression:   Mona Wagner is a 29 year old female with PMH of  DDKT in 2019, hydronephrosis of transplanted kidney (previously treated with stent 12/6/2019 and removal on 1/31/2020). Cr has been approximately 1-1.27 since removal. No imaging performed since removal. CT scan shows possible UPJ stricture (not demonstrated on intraop images from prior stent placement) with perinephric stranding. No signs of infection and creatinine is normal (1.0)    Discussed with patient that options are ureteral stent placement vs. percutaneous nephrostomy tube. We discussed that there is no urgent or emergent indication for this, given absence of infective symptoms or elevation in creatinine. Given weekend schedule, OR may not be possible until tomorrow or Monday given low acuity. We discussed plan to do it in house if transplant team requests, vs outpatient given no evidence of infection or Cr elevation at this time. We  discussed that although stent was successfully placed last time, it is possible that due to anatomy of a transplant ureter, we will be unable to place it. If that is the case, the transplant team could decide whether to proceed with PNT or continue to monitor.      Plan:     - Follow UA/UC which was ordered today, UA negative for infection.    - Trend creatinine    - Please obtain post void residual to verify patient is emptying her bladder well. Bladder distended on CT.    - After discussion with transplant team and patient, patient would like to be discharged today. She will need close outpatient followup for Cr trend. We will arrange outpatient stent placement as requested by patient. Patient aware that if she becomes symptomatic (fever, chills, pain over kidney, poor urine output etc.) she should re-present to hospital and we would stent her more urgently.       This patient's exam findings, labs, and imaging discussed with urology staff surgeon Dr. Gifford who developed the treatment plan.    Rosanna Whittington MD  Urology Resident         I agree with the resident note and plan. Discussed with transplant team that it is reasonable to attempt stent at this time. Can also consider lasix renogram to evaluate for obstruction. Patient would like to plan for discharge and outpatient placement given Cr normal (1.01) and no clinical evidence of infection.      Zora Gifford MD  Reconstructive Urology Fellow

## 2020-08-15 NOTE — H&P
Transplant Surgery History and Physical  2020    Mona Wagner  : 1990    Date of Service: 2020 11:07 PM    Assessment and Plan:  Mona Wagner is a 29 year old female w/ hydronephrosis of her renal tx.     - NPO at midnight  - IVF  - urology consult  - CT in AM    Discussed with Dr. Leayr, transplant fellow    Francie Guajardo MD (PGY-1)  Surgery      History of Present Illness:    Mona Wagner is a 29 year old female w/ hx of DDKT in  and hx of hydronephrosis treated with a stent (now removed), who presents with recurrent hydronephrosis of the transplanted kidney. She is not in any pain currently, but was previously having pain and dysuria, at which point she was treated for UTI, and she has since finished abx course about 1 week ago. Denies n/v, chest pain, SOB.     Past Medical History:  Past Medical History:   Diagnosis Date     Anemia in chronic kidney disease      Dialysis patient (H)      End stage renal disease on dialysis (H)      Endocarditis      History of DVT (deep vein thrombosis)      History of seizure      Hypertension      Hypothyroid      Kidney transplanted 2019    DCD DDKT. Induction with thymo 6mg/kg.     Pituitary adenoma (H)      Pyelonephritis of transplanted kidney 2020       Past Surgical History  Past Surgical History:   Procedure Laterality Date     BENCH KIDNEY N/A 8/3/2019    Procedure: BACKBENCH PREPARATION, ALLOGRAFT, KIDNEY;  Surgeon: Dorian Johnson MD;  [facility]     COMBINED CYSTOSCOPY, RETROGRADES, URETEROSCOPY, LASER HOLMIUM LITHOTRIPSY URETER(S), INSERT STENT N/A 2019    Procedure: CYSTOURETEROSCOPY, WITH RETROGRADE PYELOGRAM of transplant kidney, STENT INSERTION, Laser on standby;  Surgeon: Wally Britt MD;  [facility]     CREATE FISTULA ARTERIOVENOUS UPPER EXTREMITY  2014    Procedure: CREATE FISTULA ARTERIOVENOUS UPPER EXTREMITY;  Left Wrist Arteriovenous Fistula Placement;  Surgeon: Shashi Castro  MD;  Location: [facility] OR     PERCUTANEOUS BIOPSY KIDNEY Right 2019    Procedure: Right Kidney Biopsy;  Surgeon: Deny Rios MD;  Location:  OR     RASTA/DIALYSIS CATHETER  12/10/2013          TRANSPLANT KIDNEY RECIPIENT  DONOR N/A 8/3/2019    Procedure: TRANSPLANT, KIDNEY, RECIPIENT,  DONOR with Ureteral Stent Placement;  Surgeon: Dorian Johnson MD;  Location: [facility] OR       Family History:  Family History   Problem Relation Age of Onset     Hypertension Sister      Diabetes Sister      Cerebrovascular Disease Sister      Kidney Disease Mother      Kidney Disease Sister      Cerebrovascular Disease Father      Coronary Artery Disease No family hx of      Breast Cancer No family hx of      Cancer - colorectal No family hx of      Ovarian Cancer No family hx of      Prostate Cancer No family hx of      Other Cancer No family hx of      Asthma No family hx of      Anesthesia Reaction No family hx of      Deep Vein Thrombosis (DVT) No family hx of        Social History:  Social History     Tobacco Use     Smoking status: Never Smoker     Smokeless tobacco: Never Used   Substance Use Topics     Alcohol use: No     Alcohol/week: 0.0 standard drinks     Drug use: No        Medications:  No current outpatient medications on file.       Allergies:  Allergies   Allergen Reactions     Contrast Dye Rash     CT contrast allergy 19 rash over eyes. Need to have pre medication before a CT WITH CONTRAST      Chlorhexidine Rash     Rash at site     Sulfa Drugs Rash     Muscle stiffness of neck     Dapsone      Methemoglobinemia     Furosemide Other (See Comments)     Skin flushing     Lisinopril Swelling     angioedema     Metrogel [Metronidazole]      Hives diffusely on body after vaginal administration.       Review of Symptoms:  A 10 point review of symptoms has been conducted and is negative except for that mentioned in the above HPI.    Physical Exam:    Temp:  [98.1  F (36.7  C)] 98.1  F  (36.7  C)  Pulse:  [94] 94  Resp:  [18] 18  BP: (109)/(50) 109/50  SpO2:  [96 %] 96 %     Gen: NAD, resting comfortably  CV: RRR  Resp: nonlabored respirations, comfortable on RA  Abd: soft, ntnd  Ext: WWP    Labs 8/11:  WBC 5.6  Hgb 12.2    Na 138  K 4.4  BUN 32  Cr 1.27    Imaging:  Doppler US Renal Tx 8/11  IMPRESSION:   1. Right lower quadrant renal transplant moderate to severe  hydronephrosis does not change with voiding, similar in appearance to  the previous study.  2. Patent transplant vasculature without stenosis.

## 2020-08-15 NOTE — DISCHARGE SUMMARY
Pt. Discharged at 1608.     AVS with patient. Verbalized understanding. Pt. Ambulated off unit and had a ride waiting downstairs.

## 2020-08-15 NOTE — UTILIZATION REVIEW
"Admission Status; Secondary Review Determination     Under the authority of the Utilization Management Committee, the utilization review process indicated a secondary review on the above patient.  The review outcome is based on review of the medical records, discussions with staff, and applying clinical experience noted on the date of the review.          (x) Observation Status Appropriate - This patient does not meet hospital inpatient criteria and is placed in observation status. If this patient's primary payer is Medicare and was admitted as an inpatient, Condition Code 44 should be used and patient status changed to \"observation\".     RATIONALE FOR DETERMINATION   28 yo female with prior ESRD on HD, now s/p DDKT on chronic immunosuppression who presented with hydronephrosis of transplanted kidney on imaging. No abdominal pain. Recently treated as outpatient for UTI. Urine culture from 8/12 with normal urogenital anita. Not on antibiotics. WBC normal and afebrile. Previously had stent and urology consultation pending.     The severity of illness, intensity of service provided, expected LOS and risk for adverse outcome make the care appropriate for further observation; however, doesn't meet criteria for hospital inpatient admission. Dr. Nate Potts notified of this determination via text paging system. Awaiting changed order or return call to discuss.    This document was produced using voice recognition software.      The information on this document is developed by the utilization review team in order for the business office to ensure compliance.  This only denotes the appropriateness of proper admission status and does not reflect the quality of care rendered.         The definitions of Inpatient Status and Observation Status used in making the determination above are those provided in the CMS Coverage Manual, Chapter 1 and Chapter 6, section 70.4.      Sincerely,  Nicole Noel MD  Utilization " Review  Physician Advisor  [facility].

## 2020-08-16 LAB
BACTERIA SPEC CULT: ABNORMAL
BACTERIA SPEC CULT: ABNORMAL
Lab: ABNORMAL
SPECIMEN SOURCE: ABNORMAL

## 2020-08-16 NOTE — DISCHARGE SUMMARY
[facility], [place]    Discharge Summary  Solid Organ Transplant    Date of Admission:  8/14/2020  Date of Discharge:  8/15/2020  4:08 PM  Discharging Provider: France Beckman NP    Discharge Diagnoses   Active Problems:    Hydronephrosis    Hydronephrosis of kidney transplant      Follow-ups Needed After Discharge   Follow-up Appointments     Adult [facility] Follow-up and recommended labs and tests      Follow up with Urology the week of 8/17, for stenting plan.   Labs weekly to include CBC, BMP, Mag, Phos, Tacrolimus level    Appointments on [place] and/or [facility] (with [facility] or [facility]   provider or service). Call [phone] if you haven't heard regarding   these appointments within 7 days of discharge.             Discharge Disposition   Discharged to home  Condition at discharge: Good    Hospital Course   Mona Wagner was admitted on 8/14/2020 who presents for recurrent hydronephrosis of the transplanted kidney.  The following problems were addressed during her hospitalization:    DDKT 8/2019 with hydronephrosis: Baseline Cr 1.1-1.3. Admission Cr is 1.01. DSA on 8/4 was negative. A CT was obtained 8/15 that revealed persistent moderate to severe hydronephrosis of the right lower renal transplant kidney. There appears to be an abrupt change in caliber in the region of the ureterovesical junction. Urology was consulted for stent placement. Since patient denied abdominal pain, nausea, vomiting, fever, or urinary symptoms, she was discharged home with plans to complete stenting as an outpatient.     Immunosuppression management:   Tacrolimus 3.5mg BID, no dose adjustment to maintain tacrolimus goal of 8-10  Cellcept 540mg BID       Consultations This Hospital Stay   UROLOGY IP CONSULT  UROLOGY IP CONSULT  VASCULAR ACCESS CARE ADULT IP CONSULT    Code Status   Full Code    Time Spent on this Encounter   I, France Beckman NP, personally saw the patient today and spent greater  than 30 minutes discharging this patient.       France Beckman NP  [facility], [place]  ______________________________________________________________________    Physical Exam                      There were no vitals filed for this visit.  Vital Signs with Ranges     I/O last 3 completed shifts:  In: 988.33 [P.O.:250; I.V.:738.33]  Out: 60 [Urine:60]      General Appearance: in no apparent distress.   Skin: normal  Heart: regular rate and rhythm  Lungs: Non labored breathing on RA  Abdomen: The abdomen is rounded, and  non-tender. she is not tender over the kidney graft. The previous incision is well healed  : brooks is not present. Urine has no gross hematuria.   Extremities: edema: absent.   Neurologic: awake, alert and oriented. Tremor absent.      Primary Care Physician   Macarena Bowen    Discharge Orders      Reason for your hospital stay    Hydronephrosis of kidney transplant     Adult [facility] Follow-up and recommended labs and tests    Follow up with Urology the week of 8/17, for stenting plan.   Labs weekly to include CBC, BMP, Mag, Phos, Tacrolimus level    Appointments on [place] and/or [facility] (with [facility] or [facility] provider or service). Call [phone] if you haven't heard regarding these appointments within 7 days of discharge.     Activity    Your activity upon discharge: activity as tolerated     When to contact your care team    WHEN TO CONTACT YOUR  COORDINATOR:  Transplant Coordinator [phone]  Notify your coordinator if you have pain over your kidney, fever greater than 100.5F, or decreased urine output.  Notify your coordinator immediately if you are ever unable to take your immunosuppressive medications for any reason.  If it is outside of office hours, please call the hospital switchboard at [phone] and ask to have the  kidney transplant surgery fellow paged for urgent medical questions, or present to the emergency department.      Full Code     Diet    Follow this diet upon discharge:       Regular Diet Adult       Significant Results and Procedures   Most Recent 3 CBC's:  Recent Labs   Lab Test 08/15/20  0653 08/11/20  0704 08/07/20  0915   WBC 5.7 5.6 5.9   HGB 11.0* 12.2 11.9   MCV 92 92 92    205 243     Most Recent 3 BMP's:  Recent Labs   Lab Test 08/15/20  0653 08/11/20  0704 08/07/20  0915    138 138   POTASSIUM 4.4 4.4 4.2   CHLORIDE 111* 108 109   CO2 24 25 23   BUN 31* 32* 29   CR 1.01 1.27* 1.04   ANIONGAP 6 4 5   SHAMIR 9.1 9.6 9.7   GLC 99 87 86   ,   Results for orders placed or performed during the hospital encounter of 08/14/20   CT Abdomen Pelvis w/o Contrast    Addendum: 8/15/2020    There is a typographical error in the body and impression. The change  in caliber is at the level of the ureteropelvic junction (not  ureterovesical junction as transcribed originally). The remainder of  the report is as dictated.    KASIE MORIN MD      Narrative    EXAMINATION: CT ABDOMEN PELVIS W/O CONTRAST, 8/15/2020 8:56 AM    TECHNIQUE: Axial CT images from the lung bases through the symphysis  pubis were obtained  without IV contrast. Coronal and sagittal  reformats also provided.     COMPARISON: Ultrasound renal transplant 8/11/2020. CT abdomen and  pelvis with contrast 12/14/2019.    HISTORY: Flank pain, recurrent stone disease suspected;  Hydronephrosis; hydronephrosis of transplanted kidney, tx 2019    FINDINGS:    Lung Bases:   There may be some minor atelectasis present. No pleural effusions. The  lower mediastinum is within normal limits.    ABDOMEN  Small enhancing lesions may not be visualized on this noncontrast  examination.    Liver:  Liver is normal in size and CT density. No focal lesions on  noncontrast exam.     Biliary/Gallbladder:   The gallbladder is normal without evidence of radiopaque stones.  Possible minor layering sludge. The biliary tree is nondilated.    Spleen:  Spleen is normal in size and  CT density. Stable splenule along the tip  of the spleen.     Pancreas:   Unenhanced pancreas within normal limits.    Adrenals:   Adrenal glands are unremarkable.    Kidneys:   The native kidneys are markedly atrophic, slightly low-lying on the  left. No hydronephrosis.    Right lower quadrant renal transplant is enlarged and edematous with  moderate to severe hydronephrosis demonstrated as seen on recent  ultrasound. Mild perinephric stranding present. No renal calculi  visualized. There appears to be an abrupt change in caliber of the  collecting system at the level of the ureterovesical junction, coronal  series 3 image 49 with no significant distention of the ureter beyond  this level. Evaluation for a stricture or focal urothelial lesion at  this site is limited especially on noncontrast exam. Ureteral  implantation site on the anterior bladder noted. The stent present on  prior CT has been removed.    Retroperitoneal/Vasculature:  Midline vessels are normal in caliber. No enlarged lymph nodes  identified.    Gastrointestinal/Mesentery:   No bowel obstruction. There may be some minor submucosal fatty  deposition in the distal small bowel and proximal colon, nonspecific  but can be seen in prior inflammation. No acute inflammatory change  identified. Normal caliber appendix with possibly some retained  high-density contrast versus appendicolith noted, series 5 image 245.  No free air. Trace pelvic ascites.    PELVIS  Bladder:   Postsurgical changes from renal transplant with implanted ureter on  the anterior bladder to the right of midline. No bladder calculi.    Genital:   The uterus, ovaries and adnexa are within normal limits.    Other:  Postsurgical changes with scarring in the right lower quadrant  subcutaneous soft tissues. No collection along the incision. Slightly  nodular area along the medial incision, series 5 image 214 likely  scarring.    Bony Structures:   Visualized bony structures are consistent  with the patient's age.      Impression    IMPRESSION:   1.  Moderate to severe hydronephrosis of the right lower quadrant  renal transplant with edema of the kidney and perinephric stranding  present. The degree of distention has similar to recent ultrasound  allowing for differences in modalities. No obstructing radiopaque  calculi identified but there does appear to be an abrupt change in  caliber in the region of the ureterovesical junction. Evaluation for  stricture or focal urothelial lesion in this region is not well  performed on this exam.   2.  Native kidneys are atrophic.    KASIE MORIN MD     *Note: Due to a large number of results and/or encounters for the requested time period, some results have not been displayed. A complete set of results can be found in Results Review.       Discharge Medications   Discharge Medication List as of 8/15/2020  3:59 PM      CONTINUE these medications which have NOT CHANGED    Details   acetaminophen (TYLENOL) 325 MG tablet Take 2 tablets (650 mg) by mouth every 4 hours as needed for mild pain, Disp-100 tablet, R-3, E-Prescribe      aspirin (ASA) 81 MG chewable tablet CHEW AND SWALLOW 1 TABLET DAILY, Disp-36 tablet, R-10, E-Prescribe      atorvastatin (LIPITOR) 10 MG tablet Take 1 tablet (10 mg) by mouth daily, Disp-90 tablet,R-3, E-Prescribe      cinacalcet (SENSIPAR) 30 MG tablet Take 1 tablet (30 mg) by mouth daily, Disp-30 tablet,R-11, E-Prescribe      diphenhydrAMINE (BENADRYL) 25 MG capsule Take 1-2 tablets by mouth every 6 hours PRN itching/allergies, Disp-100 capsule,R-1, E-Prescribe      EPINEPHrine (ANY BX GENERIC EQUIV) 0.3 MG/0.3ML injection 2-pack Inject 0.3 mLs (0.3 mg) into the muscle as needed for anaphylaxis, Disp-2 each,R-1, E-Prescribe      fludrocortisone (FLORINEF) 0.1 MG tablet Take 1 tablet (0.1 mg) by mouth Every Mon, Wed, Fri Morning, Disp-180 tablet,R-3, Historical      hydrOXYzine (ATARAX) 10 MG tablet Take 1 tablet (10 mg) by mouth 3  times daily as needed for anxiety, Disp-20 tablet, R-0, E-Prescribe      lactobacillus rhamnosus, GG, (CULTURELL) capsule Take 1 capsule by mouth daily, Historical      levothyroxine (SYNTHROID/LEVOTHROID) 25 MCG tablet Take 1 tablet (25 mcg) Tuesday, Thursday, Saturday and Sunday and 1.5 tablets (37.5 mcg) on Monday, Wednesday and Friday every week., Disp-105 tablet,R-4, E-Prescribe      magnesium oxide (MAG-OX) 400 MG tablet Take 1 tablet (400 mg) by mouth daily, Disp-180 tablet,R-0, Historical      MYFORTIC (BRAND) 180 MG EC tablet Take 3 tablets (540 mg) by mouth 2 times daily, Disp-180 tablet, R-11, E-Prescribe      norethindrone (MICRONOR) 0.35 MG tablet Do not restart until your follow up appointment with your surgeon. Discuss restart date at that appointment., Disp-84 tablet, R-3, E-Prescribe      psyllium (METAMUCIL/KONSYL) capsule Take 1 capsule by mouth daily, Disp-90 capsule, R-3, E-PrescribeNDC covered: [id number], [id number], [id number], [id number]      simethicone (MYLICON) 125 MG chewable tablet Take 1 tablet (125 mg) by mouth 4 times daily as needed for intestinal gas, Disp-120 tablet, R-4, E-Prescribe      sodium bicarbonate 650 MG tablet TAKE 3 TABLETS(1950 MG) BY MOUTH TWICE DAILY, Disp-180 tablet, R-11, E-Prescribe      tacrolimus (GENERIC EQUIVALENT) 0.5 MG capsule Take 1 capsule (0.5 mg) by mouth 2 times daily Total dose = 3.5 mg BID, Disp-60 capsule,R-11, E-Prescribe      tacrolimus (GENERIC EQUIVALENT) 1 MG capsule Take 3 capsules (3 mg) by mouth 2 times daily Total dose = 3.5 mg BID, Disp-180 capsule,R-11, E-Prescribe         STOP taking these medications       ampicillin (PRINCIPEN) 500 MG capsule Comments:   Reason for Stopping:             Allergies   Allergies   Allergen Reactions     Contrast Dye Rash     CT contrast allergy 12/14/19 rash over eyes. Need to have pre medication before a CT WITH CONTRAST      Chlorhexidine Rash     Rash at site     Sulfa Drugs Rash     Muscle  stiffness of neck     Dapsone      Methemoglobinemia     Furosemide Other (See Comments)     Skin flushing     Lisinopril Swelling     angioedema     Metrogel [Metronidazole]      Hives diffusely on body after vaginal administration.

## 2020-08-17 ENCOUNTER — PREP FOR PROCEDURE (OUTPATIENT)
Dept: UROLOGY | Facility: CLINIC | Age: 30
End: 2020-08-17

## 2020-08-17 ENCOUNTER — TELEPHONE (OUTPATIENT)
Dept: UROLOGY | Facility: CLINIC | Age: 30
End: 2020-08-17

## 2020-08-17 DIAGNOSIS — N13.5 STRICTURE OR KINKING OF URETER: Primary | ICD-10-CM

## 2020-08-17 DIAGNOSIS — N13.39 OTHER HYDRONEPHROSIS: Primary | ICD-10-CM

## 2020-08-17 DIAGNOSIS — Z11.59 ENCOUNTER FOR SCREENING FOR OTHER VIRAL DISEASES: Primary | ICD-10-CM

## 2020-08-17 RX ORDER — CEFAZOLIN SODIUM 2 G/50ML
2 SOLUTION INTRAVENOUS
Status: CANCELLED | OUTPATIENT
Start: 2020-08-17

## 2020-08-17 RX ORDER — CEFAZOLIN SODIUM 1 G/50ML
1 INJECTION, SOLUTION INTRAVENOUS SEE ADMIN INSTRUCTIONS
Status: CANCELLED | OUTPATIENT
Start: 2020-08-17

## 2020-08-17 NOTE — TELEPHONE ENCOUNTER
Patient is scheduled for surgery with Dr. Balwinder Dunn    Date of Surgery: 08/20/20    Location: [facility] OR    Informed patient they will need an adult  yes    H&P: Scheduled with already done    Additional imaging/appointments: n/a    Surgery packet: n/a     Additional comments: n/a

## 2020-08-17 NOTE — PROGRESS NOTES
Patient contacted care team in regards to questions and concerns  No further calls were made at this time

## 2020-08-18 ENCOUNTER — HOSPITAL ENCOUNTER (OUTPATIENT)
Dept: NUCLEAR MEDICINE | Facility: CLINIC | Age: 30
Setting detail: NUCLEAR MEDICINE
End: 2020-08-18
Attending: UROLOGY
Payer: MEDICARE

## 2020-08-18 ENCOUNTER — MYC MEDICAL ADVICE (OUTPATIENT)
Dept: SURGERY | Facility: AMBULATORY SURGERY CENTER | Age: 30
End: 2020-08-18

## 2020-08-18 DIAGNOSIS — Z79.899 ENCOUNTER FOR LONG-TERM CURRENT USE OF MEDICATION: ICD-10-CM

## 2020-08-18 DIAGNOSIS — Z48.298 AFTERCARE FOLLOWING ORGAN TRANSPLANT: ICD-10-CM

## 2020-08-18 DIAGNOSIS — Z94.0 KIDNEY REPLACED BY TRANSPLANT: ICD-10-CM

## 2020-08-18 DIAGNOSIS — N13.39 OTHER HYDRONEPHROSIS: ICD-10-CM

## 2020-08-18 DIAGNOSIS — Z11.59 ENCOUNTER FOR SCREENING FOR OTHER VIRAL DISEASES: ICD-10-CM

## 2020-08-18 DIAGNOSIS — Z48.298 AFTERCARE FOLLOWING ORGAN TRANSPLANT: Primary | ICD-10-CM

## 2020-08-18 DIAGNOSIS — N39.0 RECURRENT UTI: ICD-10-CM

## 2020-08-18 LAB
ANION GAP SERPL CALCULATED.3IONS-SCNC: 6 MMOL/L (ref 3–14)
BUN SERPL-MCNC: 26 MG/DL (ref 7–30)
CALCIUM SERPL-MCNC: 9.7 MG/DL (ref 8.5–10.1)
CHLORIDE SERPL-SCNC: 110 MMOL/L (ref 94–109)
CO2 SERPL-SCNC: 22 MMOL/L (ref 20–32)
CREAT SERPL-MCNC: 1.07 MG/DL (ref 0.52–1.04)
ERYTHROCYTE [DISTWIDTH] IN BLOOD BY AUTOMATED COUNT: 13.2 % (ref 10–15)
GFR SERPL CREATININE-BSD FRML MDRD: 71 ML/MIN/{1.73_M2}
GLUCOSE SERPL-MCNC: 92 MG/DL (ref 70–99)
HCT VFR BLD AUTO: 38.4 % (ref 35–47)
HGB BLD-MCNC: 11.8 G/DL (ref 11.7–15.7)
MCH RBC QN AUTO: 28.3 PG (ref 26.5–33)
MCHC RBC AUTO-ENTMCNC: 30.7 G/DL (ref 31.5–36.5)
MCV RBC AUTO: 92 FL (ref 78–100)
PLATELET # BLD AUTO: 229 10E9/L (ref 150–450)
POTASSIUM SERPL-SCNC: 4.6 MMOL/L (ref 3.4–5.3)
RBC # BLD AUTO: 4.17 10E12/L (ref 3.8–5.2)
SODIUM SERPL-SCNC: 138 MMOL/L (ref 133–144)
TACROLIMUS BLD-MCNC: 8 UG/L (ref 5–15)
TME LAST DOSE: NORMAL H
WBC # BLD AUTO: 5.9 10E9/L (ref 4–11)

## 2020-08-18 PROCEDURE — U0003 INFECTIOUS AGENT DETECTION BY NUCLEIC ACID (DNA OR RNA); SEVERE ACUTE RESPIRATORY SYNDROME CORONAVIRUS 2 (SARS-COV-2) (CORONAVIRUS DISEASE [COVID-19]), AMPLIFIED PROBE TECHNIQUE, MAKING USE OF HIGH THROUGHPUT TECHNOLOGIES AS DESCRIBED BY CMS-2020-01-R: HCPCS | Performed by: UROLOGY

## 2020-08-18 PROCEDURE — 80197 ASSAY OF TACROLIMUS: CPT | Performed by: INTERNAL MEDICINE

## 2020-08-18 PROCEDURE — 34300033 ZZH RX 343: Performed by: UROLOGY

## 2020-08-18 PROCEDURE — 85027 COMPLETE CBC AUTOMATED: CPT | Performed by: INTERNAL MEDICINE

## 2020-08-18 PROCEDURE — 80048 BASIC METABOLIC PNL TOTAL CA: CPT | Performed by: INTERNAL MEDICINE

## 2020-08-18 PROCEDURE — A9562 TC99M MERTIATIDE: HCPCS | Performed by: UROLOGY

## 2020-08-18 PROCEDURE — 78707 K FLOW/FUNCT IMAGE W/O DRUG: CPT

## 2020-08-18 RX ADMIN — TECHNESCAN TC 99M MERTIATIDE 10 MCI.: 1 INJECTION, POWDER, LYOPHILIZED, FOR SOLUTION INTRAVENOUS at 13:37

## 2020-08-19 ENCOUNTER — ANESTHESIA EVENT (OUTPATIENT)
Dept: SURGERY | Facility: AMBULATORY SURGERY CENTER | Age: 30
End: 2020-08-19

## 2020-08-19 ENCOUNTER — PATIENT OUTREACH (OUTPATIENT)
Dept: UROLOGY | Facility: CLINIC | Age: 30
End: 2020-08-19

## 2020-08-19 ENCOUNTER — TELEPHONE (OUTPATIENT)
Dept: UROLOGY | Facility: CLINIC | Age: 30
End: 2020-08-19

## 2020-08-19 DIAGNOSIS — N39.0 URINARY TRACT INFECTION: Primary | ICD-10-CM

## 2020-08-19 DIAGNOSIS — Z94.0 KIDNEY REPLACED BY TRANSPLANT: Primary | ICD-10-CM

## 2020-08-19 LAB
SARS-COV-2 RNA SPEC QL NAA+PROBE: NOT DETECTED
SPECIMEN SOURCE: NORMAL

## 2020-08-19 RX ORDER — CEPHALEXIN 500 MG/1
500 CAPSULE ORAL 2 TIMES DAILY
Qty: 2 CAPSULE | Refills: 0 | Status: SHIPPED | OUTPATIENT
Start: 2020-08-19 | End: 2020-08-20

## 2020-08-19 RX ORDER — TACROLIMUS 1 MG/1
3 CAPSULE ORAL 2 TIMES DAILY
Qty: 180 CAPSULE | Refills: 11 | Status: SHIPPED | OUTPATIENT
Start: 2020-08-19 | End: 2021-09-14

## 2020-08-19 RX ORDER — TACROLIMUS 0.5 MG/1
CAPSULE ORAL
Qty: 60 CAPSULE | Refills: 11 | Status: SHIPPED | OUTPATIENT
Start: 2020-08-19 | End: 2023-02-07

## 2020-08-19 NOTE — TELEPHONE ENCOUNTER
[facility] Call Center    Phone Message    May a detailed message be left on voicemail: yes     Reason for Call: Other: Pt called and said that she just got a cold sore and wanted to know if that will effect the procedure tomorrow. Please call her back. Thanks     Action Taken: Message routed to:  [facility] ([facility] URO    Travel Screening: Not Applicable

## 2020-08-19 NOTE — TELEPHONE ENCOUNTER
Pre Op Teaching Flowsheet       Pre and Post op Patient Education  Relevant Diagnosis:  Stricture or kinking of ureter   Surgical procedure:  CYSTOSCOPY, WITH RETROGRADE PYELOGRAM AND URETERAL STENT INSERTION - TRANSPLANT KIDNEY  Teaching Topic:  Pre and post op teaching  Person Involved in teaching: Mona Wagner    Motivation Level:  Asks Questions: Yes  Eager to Learn:  Yes  Cooperative: Yes  Receptive (willing/able to accept information):  Yes    Patient demonstrates understanding of the following:  Date of surgery:  8/20/20  Location of surgery:  [facility]- 5th Floor  History and Physical and any other testing necessary prior to surgery: Yes  Required time line for completion of History and Physical and any pre-op testing: Yes    Patient demonstrates understanding of the following:  Pre-op bowel prep:  None  Pre-op showering/scrub information with PCMX Soap: Yes  Blood thinner medications discussed and when to stop (if applicable):  Yes      Infection Prevention:   Patient demonstrates understanding of the following:  Surgical procedure site care taught: at time of discharge  Signs and symptoms of infection taught:  Yes      Post-op follow-up:  Discussed how to contact the hospital, nurse, and clinic scheduling staff if necessary.    Instructional materials used/given/mailed:  [place] [facility] Booklet, post op teaching sheet, Map, Soap, and arrival/location information.    Patient has a  and someone to stay with her for the first 24 hours.    Patient was given one day of Keflex due to strep seen in urine culture per Dr. Britt.

## 2020-08-19 NOTE — TELEPHONE ENCOUNTER
Called patient and she has uti as well cold sore just started yesterday Cris Drake LPN Staff Nurse

## 2020-08-19 NOTE — TELEPHONE ENCOUNTER
ISSUE:   Tacrolimus IR level 8 on 8/18, goal 4-6, dose 3.5 mg BID.    PLAN:   Please call patient and confirm this was an accurate 12-hour trough. Verify Tacrolimus IR dose 3.5 mg BID. Confirm no new medications or illness. Confirm no missed doses. If accurate trough and accurate dose, decrease Tacrolimus IR dose to 3 mg BID and repeat labs next week, as scheduled.    Melani Vazquez RN, BSN, [facility]  Transplant Care Coordinator      OUTCOME:   Spoke with patient, they confirm accurate trough level and current dose 3.5 mg BID. Patient confirmed dose change to 3 mg BID and to repeat labs in 1 weeks. Orders sent to preferred pharmacy for dose change and lab for repeat labs. Patient voiced understanding of plan.

## 2020-08-20 ENCOUNTER — HOSPITAL ENCOUNTER (OUTPATIENT)
Facility: AMBULATORY SURGERY CENTER | Age: 30
End: 2020-08-20
Attending: UROLOGY
Payer: MEDICARE

## 2020-08-20 ENCOUNTER — ANCILLARY PROCEDURE (OUTPATIENT)
Dept: RADIOLOGY | Facility: AMBULATORY SURGERY CENTER | Age: 30
End: 2020-08-20
Attending: UROLOGY
Payer: MEDICARE

## 2020-08-20 ENCOUNTER — ANESTHESIA (OUTPATIENT)
Dept: SURGERY | Facility: AMBULATORY SURGERY CENTER | Age: 30
End: 2020-08-20

## 2020-08-20 VITALS
SYSTOLIC BLOOD PRESSURE: 108 MMHG | HEART RATE: 78 BPM | BODY MASS INDEX: 28.43 KG/M2 | WEIGHT: 144.8 LBS | RESPIRATION RATE: 16 BRPM | DIASTOLIC BLOOD PRESSURE: 58 MMHG | OXYGEN SATURATION: 100 % | HEIGHT: 60 IN | TEMPERATURE: 98 F

## 2020-08-20 DIAGNOSIS — R32 URINARY INCONTINENCE, UNSPECIFIED TYPE: ICD-10-CM

## 2020-08-20 DIAGNOSIS — N13.5 STRICTURE OR KINKING OF URETER: Primary | ICD-10-CM

## 2020-08-20 DIAGNOSIS — N13.5 STRICTURE OR KINKING OF URETER: ICD-10-CM

## 2020-08-20 LAB
HCG UR QL: NEGATIVE
INTERNAL QC OK POCT: YES

## 2020-08-20 DEVICE — STENT URETERAL PERCUFLEX PLUS 6FRX22CM M0061752610: Type: IMPLANTABLE DEVICE | Site: URETHRA | Status: FUNCTIONAL

## 2020-08-20 RX ORDER — PROPOFOL 10 MG/ML
INJECTION, EMULSION INTRAVENOUS PRN
Status: DISCONTINUED | OUTPATIENT
Start: 2020-08-20 | End: 2020-08-20

## 2020-08-20 RX ORDER — DEXAMETHASONE SODIUM PHOSPHATE 4 MG/ML
INJECTION, SOLUTION INTRA-ARTICULAR; INTRALESIONAL; INTRAMUSCULAR; INTRAVENOUS; SOFT TISSUE PRN
Status: DISCONTINUED | OUTPATIENT
Start: 2020-08-20 | End: 2020-08-20

## 2020-08-20 RX ORDER — AMOXICILLIN 250 MG
1-2 CAPSULE ORAL 2 TIMES DAILY
Qty: 30 TABLET | Refills: 0 | Status: SHIPPED | OUTPATIENT
Start: 2020-08-20 | End: 2023-01-16

## 2020-08-20 RX ORDER — PROPOFOL 10 MG/ML
INJECTION, EMULSION INTRAVENOUS CONTINUOUS PRN
Status: DISCONTINUED | OUTPATIENT
Start: 2020-08-20 | End: 2020-08-20

## 2020-08-20 RX ORDER — TAMSULOSIN HYDROCHLORIDE 0.4 MG/1
0.4 CAPSULE ORAL DAILY
Qty: 30 CAPSULE | Refills: 0 | Status: SHIPPED | OUTPATIENT
Start: 2020-08-20 | End: 2020-11-17

## 2020-08-20 RX ORDER — OXYCODONE HYDROCHLORIDE 5 MG/1
5-10 TABLET ORAL EVERY 4 HOURS PRN
Qty: 6 TABLET | Refills: 0 | Status: SHIPPED | OUTPATIENT
Start: 2020-08-20 | End: 2020-11-17

## 2020-08-20 RX ORDER — NALOXONE HYDROCHLORIDE 0.4 MG/ML
.1-.4 INJECTION, SOLUTION INTRAMUSCULAR; INTRAVENOUS; SUBCUTANEOUS
Status: DISCONTINUED | OUTPATIENT
Start: 2020-08-20 | End: 2020-08-21 | Stop reason: HOSPADM

## 2020-08-20 RX ORDER — CEFAZOLIN SODIUM 1 G/50ML
1 SOLUTION INTRAVENOUS SEE ADMIN INSTRUCTIONS
Status: DISCONTINUED | OUTPATIENT
Start: 2020-08-20 | End: 2020-08-20 | Stop reason: HOSPADM

## 2020-08-20 RX ORDER — ACETAMINOPHEN 325 MG/1
975 TABLET ORAL ONCE
Status: COMPLETED | OUTPATIENT
Start: 2020-08-20 | End: 2020-08-20

## 2020-08-20 RX ORDER — ONDANSETRON 2 MG/ML
INJECTION INTRAMUSCULAR; INTRAVENOUS PRN
Status: DISCONTINUED | OUTPATIENT
Start: 2020-08-20 | End: 2020-08-20

## 2020-08-20 RX ORDER — OXYCODONE HYDROCHLORIDE 5 MG/1
5 TABLET ORAL EVERY 4 HOURS PRN
Status: DISCONTINUED | OUTPATIENT
Start: 2020-08-20 | End: 2020-08-21 | Stop reason: HOSPADM

## 2020-08-20 RX ORDER — OXYBUTYNIN CHLORIDE 5 MG/1
5 TABLET ORAL 3 TIMES DAILY PRN
Qty: 45 TABLET | Refills: 0 | Status: SHIPPED | OUTPATIENT
Start: 2020-08-20 | End: 2020-11-17

## 2020-08-20 RX ORDER — SODIUM CHLORIDE, SODIUM LACTATE, POTASSIUM CHLORIDE, CALCIUM CHLORIDE 600; 310; 30; 20 MG/100ML; MG/100ML; MG/100ML; MG/100ML
INJECTION, SOLUTION INTRAVENOUS CONTINUOUS
Status: DISCONTINUED | OUTPATIENT
Start: 2020-08-20 | End: 2020-08-20 | Stop reason: HOSPADM

## 2020-08-20 RX ORDER — TOLTERODINE TARTRATE 2 MG/1
2 TABLET, EXTENDED RELEASE ORAL EVERY 12 HOURS PRN
Qty: 30 TABLET | Refills: 0 | Status: SHIPPED | OUTPATIENT
Start: 2020-08-20 | End: 2020-11-17

## 2020-08-20 RX ORDER — HYDROMORPHONE HYDROCHLORIDE 1 MG/ML
.3-.5 INJECTION, SOLUTION INTRAMUSCULAR; INTRAVENOUS; SUBCUTANEOUS EVERY 10 MIN PRN
Status: DISCONTINUED | OUTPATIENT
Start: 2020-08-20 | End: 2020-08-21 | Stop reason: HOSPADM

## 2020-08-20 RX ORDER — SODIUM CHLORIDE, SODIUM LACTATE, POTASSIUM CHLORIDE, CALCIUM CHLORIDE 600; 310; 30; 20 MG/100ML; MG/100ML; MG/100ML; MG/100ML
INJECTION, SOLUTION INTRAVENOUS CONTINUOUS
Status: DISCONTINUED | OUTPATIENT
Start: 2020-08-20 | End: 2020-08-21 | Stop reason: HOSPADM

## 2020-08-20 RX ORDER — PHENAZOPYRIDINE HYDROCHLORIDE 200 MG/1
200 TABLET, FILM COATED ORAL 3 TIMES DAILY PRN
Qty: 9 TABLET | Refills: 0 | Status: SHIPPED | OUTPATIENT
Start: 2020-08-20 | End: 2020-11-17

## 2020-08-20 RX ORDER — CEFAZOLIN SODIUM 2 G/50ML
2 SOLUTION INTRAVENOUS
Status: COMPLETED | OUTPATIENT
Start: 2020-08-20 | End: 2020-08-20

## 2020-08-20 RX ORDER — FENTANYL CITRATE 50 UG/ML
INJECTION, SOLUTION INTRAMUSCULAR; INTRAVENOUS PRN
Status: DISCONTINUED | OUTPATIENT
Start: 2020-08-20 | End: 2020-08-20

## 2020-08-20 RX ORDER — FENTANYL CITRATE 50 UG/ML
25-50 INJECTION, SOLUTION INTRAMUSCULAR; INTRAVENOUS EVERY 5 MIN PRN
Status: DISCONTINUED | OUTPATIENT
Start: 2020-08-20 | End: 2020-08-20 | Stop reason: HOSPADM

## 2020-08-20 RX ORDER — ONDANSETRON 2 MG/ML
4 INJECTION INTRAMUSCULAR; INTRAVENOUS EVERY 30 MIN PRN
Status: DISCONTINUED | OUTPATIENT
Start: 2020-08-20 | End: 2020-08-21 | Stop reason: HOSPADM

## 2020-08-20 RX ORDER — CEPHALEXIN 500 MG/1
500 CAPSULE ORAL 3 TIMES DAILY
Qty: 21 CAPSULE | Refills: 0 | Status: SHIPPED | OUTPATIENT
Start: 2020-08-20 | End: 2020-08-27

## 2020-08-20 RX ORDER — ONDANSETRON 4 MG/1
4 TABLET, ORALLY DISINTEGRATING ORAL EVERY 30 MIN PRN
Status: DISCONTINUED | OUTPATIENT
Start: 2020-08-20 | End: 2020-08-21 | Stop reason: HOSPADM

## 2020-08-20 RX ORDER — LIDOCAINE 40 MG/G
CREAM TOPICAL
Status: DISCONTINUED | OUTPATIENT
Start: 2020-08-20 | End: 2020-08-20 | Stop reason: HOSPADM

## 2020-08-20 RX ADMIN — ONDANSETRON 4 MG: 2 INJECTION INTRAMUSCULAR; INTRAVENOUS at 08:45

## 2020-08-20 RX ADMIN — ACETAMINOPHEN 975 MG: 325 TABLET ORAL at 07:24

## 2020-08-20 RX ADMIN — PROPOFOL 150 MCG/KG/MIN: 10 INJECTION, EMULSION INTRAVENOUS at 08:45

## 2020-08-20 RX ADMIN — PROPOFOL 50 MG: 10 INJECTION, EMULSION INTRAVENOUS at 08:45

## 2020-08-20 RX ADMIN — FENTANYL CITRATE 25 MCG: 50 INJECTION, SOLUTION INTRAMUSCULAR; INTRAVENOUS at 08:55

## 2020-08-20 RX ADMIN — DEXAMETHASONE SODIUM PHOSPHATE 4 MG: 4 INJECTION, SOLUTION INTRA-ARTICULAR; INTRALESIONAL; INTRAMUSCULAR; INTRAVENOUS; SOFT TISSUE at 09:07

## 2020-08-20 RX ADMIN — FENTANYL CITRATE 50 MCG: 50 INJECTION, SOLUTION INTRAMUSCULAR; INTRAVENOUS at 09:01

## 2020-08-20 RX ADMIN — FENTANYL CITRATE 25 MCG: 50 INJECTION, SOLUTION INTRAMUSCULAR; INTRAVENOUS at 08:50

## 2020-08-20 RX ADMIN — ONDANSETRON 4 MG: 4 TABLET, ORALLY DISINTEGRATING ORAL at 10:18

## 2020-08-20 RX ADMIN — OXYCODONE HYDROCHLORIDE 5 MG: 5 TABLET ORAL at 10:56

## 2020-08-20 RX ADMIN — CEFAZOLIN SODIUM 2 G: 2 SOLUTION INTRAVENOUS at 08:45

## 2020-08-20 RX ADMIN — SODIUM CHLORIDE, SODIUM LACTATE, POTASSIUM CHLORIDE, CALCIUM CHLORIDE: 600; 310; 30; 20 INJECTION, SOLUTION INTRAVENOUS at 07:51

## 2020-08-20 ASSESSMENT — MIFFLIN-ST. JEOR: SCORE: 1310.82

## 2020-08-20 ASSESSMENT — ENCOUNTER SYMPTOMS: SEIZURES: 1

## 2020-08-20 NOTE — DISCHARGE INSTRUCTIONS
[facility]  Home Care Following Anesthesia  For 24 hours after surgery:  1. Get plenty of rest.  A responsible adult must stay with you for at least 24 hours after you leave the surgery center.  2. Do not drive or use heavy equipment.  If you have weakness or tingling, don't drive or use heavy equipment until this feeling goes away.   3. Do not drink alcohol.   4. Avoid strenuous or risky activities.  Ask for help when climbing stairs.  5. You may feel lightheaded.  IF so, sit for a few minutes before standing.  Have someone help you get up.   6. If you have nausea (feel sick to your stomach): Drink only clear liquids such as apple juice, ginger ale, broth or 7-Up.  Rest may also help.  Be sure to drink enough fluids.  Move to a regular diet as you feel able.   7. You may have a slight fever.  Call the doctor if your fever is over 100 F (37.7 C) (taken under the tongue) or lasts longer than 24 hours.  8. You may have a dry mouth, a sore throat, muscle aches or trouble sleeping. These should go away after 24 hours.  9. Do not make important or legal decisions.       Tips for taking pain medications  To get the best pain relief possible, remember these points:    Take pain medications as directed, before pain becomes severe.    Pain medication can upset your stomach: taking it with food may help.    Constipation is a common side effect of pain medication. Drink plenty of  fluids.    Eat foods high in fiber. Take a stool softener if recommended by your doctor or pharmacist.    Do not drink alcohol, drive or operate machinery while taking pain medications.    Ask about other ways to control pain, such as with heat, ice or relaxation.    Tylenol/Acetaminophen Consumption  To help encourage the safe use of acetaminophen, the makers of TYLENOL  have lowered the maximum daily dose for single-ingredient Extra Strength TYLENOL  (acetaminophen) products sold in the [place] from 8 pills per  day (4,000 mg) to 6 pills per day (3,000 mg). The dosing interval has also changed from 2 pills every 4-6 hours to 2 pills every 6 hours.    If you feel your pain relief is insufficient, you may take Tylenol/Acetaminophen in addition to your narcotic pain medication.     Be careful not to exceed 3,000 mg of Tylenol/Acetaminophen in a 24 hour period from all sources.    If you are taking extra strength Tylenol/acetaminophen (500 mg), the maximum dose is 6 tablets in 24 hours.    If you are taking regular strength acetaminophen (325 mg), the maximum dose is 9 tablets in 24 hours.    Call a doctor for any of the followin. Signs of infection (fever, growing tenderness at the surgery site, a large amount of drainage or bleeding, severe pain, foul-smelling drainage, redness, swelling).  2. It has been over 8 to 10 hours since surgery and you are still not able to urinate (pass water).  3. Headache for over 24 hours.  4. Signs of Covid-19 infection (temperature over 100 degrees, shortness of breath, cough, loss of taste/smell, generalized body aches, persistent headache, chills, sore throat, nausea/vomiting/diarrhea)  Your doctor is:  Dr. Wally Britt, Prostate and Urology: [phone]                Or dial [phone] and ask for the resident on call for:  Prostate Urology  For emergency care, call the:  [place] [facility]:  [phone] (TTY for hearing impaired: [phone])

## 2020-08-20 NOTE — ANESTHESIA CARE TRANSFER NOTE
Patient: Mona Wagner    Procedure(s):  CYSTOSCOPY, WITH RETROGRADE PYELOGRAM, CYSTOGRAM AND URETERAL STENT INSERTION - TRANSPLANT KIDNEY    Diagnosis: Stricture or kinking of ureter [N13.5]  Diagnosis Additional Information: No value filed.    Anesthesia Type:   MAC     Note:  Airway :Room Air  Patient transferred to:PACU  Comments: Uneventful transport - phase 2 - room air  Report to ERMELINDA Mooney   Exchanging well; color natl  Pt responds appropriately to command; pt awake  IV patent  Lips/teeth/dentition as preop status  Questions answered  BP 96/54  HR 71  TAT 98.4  RR 16  Sat 98%  Handoff Report: Identifed the Patient, Identified the Reponsible Provider, Reviewed the pertinent medical history, Discussed the surgical course, Reviewed Intra-OP anesthesia mangement and issues during anesthesia, Set expectations for post-procedure period and Allowed opportunity for questions and acknowledgement of understanding      Vitals: (Last set prior to Anesthesia Care Transfer)    CRNA VITALS  8/20/2020 0910 - 8/20/2020 0945      8/20/2020             Resp Rate (set):  10                Electronically Signed By: ALCIDES DURAN CRNA  August 20, 2020  9:45 AM

## 2020-08-20 NOTE — OP NOTE
PREOPERATIVE DIAGNOSIS: Hydronephrosis of Transplanted Kidney    POSTOPERATIVE DIAGNOSIS:  Same    PROCEDURES PERFORMED:   1. Cystourethroscopy with retrograde pyelography of transplanted kidney  2. Placement of ureteral stent for transplanted kidney  3. Intraoperative interpretation of fluoroscopic imaging.   4. cystogram    STAFF SURGEON: Wally Britt MD    RESIDENT: None    ANESTHESIA: General    ESTIMATED BLOOD LOSS: 0 mL.     DRAINS: 6x22cm double J ureteral stent in transplanted ureter     COMPLICATIONS: None     FINDINGS:  1. Mild hydronephrosis of transplanted kidney  2. NO filling defects or strictures of transplanted ureter  3. Successful insertion of ureteral stent with good curl noted in transplanted kidney renal pelvis and bladder on fluorscopy  4.  No reflux on cystogram    OPERATIVE INDICATIONS:   Mona Wagner is a 29 year old female who presented with hydronephrosis of her transplanted kidney. The patient was counseled on the alternatives, risks, and benefits and elected to proceed with the above stated procedure.    DESCRIPTION OF PROCEDURE:    After informed consent was obtained, the patient was taken to the operating room, and moved to the operating table.  After adequate anesthesia was induced, the patient was repositioned in dorsal lithotomy position and prepped and draped in the usual sterile fashion. A timeout was taken to confirm correct patient, procedure and laterality.     A 19-Ethiopian cystoscope was inserted into a well lubricated urethra. The urethra was unremarkable.  The bladder was free of tumors, stones or diverticuli.  The media was clear.  Bilateral ureteral orifices were orthotopic. The transplanted ureteral orifice was noted on the right superior aspect of the bladder and was normal appearing.    A cystogram was done with 300cc of urine.  There was no reflux.     A Sensor guidewire was advanced into the transplanted renal pelvis with the aid of a 5-Ethiopian open ended ureteral  catheter. Next a 10 Greek dual lumen catheter was advanced over the sensor wire. A retrograde pyelogram demonstrated mild hydronephrosis and no filling defects. The catheter passed very easily. A second wire was then placed via the 10 Greek dual lumen catheter to serve as a safety wire.  We attempted to pass a 12/14 ureteral access sheath to gauge the tightness of the ureter but this wasn't possible given the location of the reimplant.     A 6x22cm ureteral JJ stent was placed under direct visualization AND fluoroscopic guidance, with a good curl of the stent noted in the renal pelvis of the transplanted kidney and in the bladder.  The stent passed up easily with no appreciable resistance.  The bladder was then drained.    The patient tolerated the procedure well.  There were no complications.         PLAN:   - Patient was transferred to PACU and discharged to home when meeting PACU criteria  - Patient will follow-up in 3-4 weeks with a Lasix Renogram.

## 2020-08-20 NOTE — ANESTHESIA POSTPROCEDURE EVALUATION
Anesthesia POST Procedure Evaluation    Patient: Mona Wagner   MRN:     [id number] Gender:   female   Age:    29 year old :      1990        Preoperative Diagnosis: Stricture or kinking of ureter [N13.5]   Procedure(s):  CYSTOSCOPY, WITH RETROGRADE PYELOGRAM, CYSTOGRAM AND URETERAL STENT INSERTION - TRANSPLANT KIDNEY   Postop Comments: No value filed.     Anesthesia Type: MAC       Disposition: Outpatient   Postop Pain Control: Uneventful            Sign Out: Well controlled pain   PONV: No   Neuro/Psych: Uneventful            Sign Out: Acceptable/Baseline neuro status   Airway/Respiratory: Uneventful            Sign Out: Acceptable/Baseline resp. status   CV/Hemodynamics: Uneventful            Sign Out: Acceptable CV status   Other NRE: NONE   DID A NON-ROUTINE EVENT OCCUR? No         Last Anesthesia Record Vitals:  CRNA VITALS  2020 0910 - 2020 1010      2020             Resp Rate (set):  10          Last PACU Vitals:  Vitals Value Taken Time   /58 2020  9:41 AM   Temp 36.7  C (98  F) 2020  9:41 AM   Pulse     Resp 16 2020  9:41 AM   SpO2 100 % 2020  9:41 AM   Temp src Available 2020  9:30 AM   NIBP 96/76 2020  9:37 AM   Pulse 69 2020  9:39 AM   SpO2 100 % 2020  9:39 AM   Resp     Temp     Ht Rate 71 2020  9:37 AM   Temp 2           Electronically Signed By: Ignacio Ferraro MD, MD, 2020, 10:39 AM

## 2020-08-20 NOTE — ANESTHESIA PREPROCEDURE EVALUATION
Anesthesia Pre-Procedure Evaluation    Patient: Mona Wagner   MRN:     [id number] Gender:   female   Age:    29 year old :      1990        Preoperative Diagnosis: Stricture or kinking of ureter [N13.5]   Procedure(s):  CYSTOSCOPY, WITH RETROGRADE PYELOGRAM AND URETERAL STENT INSERTION - TRANSPLANT KIDNEY     LABS:  CBC:   Lab Results   Component Value Date    WBC 5.9 2020    WBC 5.7 08/15/2020    HGB 11.8 2020    HGB 11.0 (L) 08/15/2020    HCT 38.4 2020    HCT 36.6 08/15/2020     2020     08/15/2020     BMP:   Lab Results   Component Value Date     2020     08/15/2020    POTASSIUM 4.6 2020    POTASSIUM 4.4 08/15/2020    CHLORIDE 110 (H) 2020    CHLORIDE 111 (H) 08/15/2020    CO2 22 2020    CO2 24 08/15/2020    BUN 26 2020    BUN 31 (H) 08/15/2020    CR 1.07 (H) 2020    CR 1.01 08/15/2020    GLC 92 2020    GLC 99 08/15/2020     COAGS:   Lab Results   Component Value Date    PTT 33 2019    INR 0.95 2019    FIBR 311 2017     POC:   Lab Results   Component Value Date     (H) 2019    HCG Negative 2020    HCGS Negative 2019     OTHER:   Lab Results   Component Value Date    LACT 1.7 2020    A1C 5.5 2020    SHAMIR 9.7 2020    PHOS 3.2 08/15/2020    MAG 1.7 08/15/2020    ALBUMIN 4.0 2020    PROTTOTAL 7.9 2020    ALT 19 2020    AST 11 2020    ALKPHOS 185 (H) 2020    BILITOTAL 0.7 2020    BILIDIRECT 0.2 2013    TSH 2.13 2020    T4 1.02 2020    T3 97 2019    CRP 26.0 (H) 2020    SED 29 (H) 2020        Preop Vitals    BP Readings from Last 3 Encounters:   20 108/79   08/15/20 95/62   20 104/77    Pulse Readings from Last 3 Encounters:   20 78   08/15/20 67   20 82      Resp Readings from Last 3 Encounters:   20 16   08/15/20 16   20 20    SpO2 Readings from Last 3  "Encounters:   08/20/20 99%   08/15/20 98%   03/13/20 96%      Temp Readings from Last 1 Encounters:   08/20/20 36.7  C (98  F) (Oral)    Ht Readings from Last 1 Encounters:   08/20/20 1.52 m (4' 11.84\")      Wt Readings from Last 1 Encounters:   08/20/20 65.7 kg (144 lb 12.8 oz)    Estimated body mass index is 28.43 kg/m  as calculated from the following:    Height as of this encounter: 1.52 m (4' 11.84\").    Weight as of this encounter: 65.7 kg (144 lb 12.8 oz).     LDA:  Peripheral IV 01/24/20 Right;Anterior Lower forearm (Active)   Site Assessment [facility] 01/26/20 0700   Line Status Infusing 01/26/20 0700   Phlebitis Scale 0-->no symptoms 01/26/20 0700   Infiltration Scale 0 01/26/20 0700   Extravasation? No 01/26/20 0700   Number of days: 209       Peripheral IV 08/15/20 Right;Posterior Upper forearm (Active)   Site Assessment [facility] 08/15/20 1300   Line Status Saline locked 08/15/20 1300   Phlebitis Scale 0-->no symptoms 08/15/20 1300   Infiltration Scale 0 08/15/20 1300   Infiltration Site Treatment Method  None 08/15/20 1300   Extravasation? No 08/15/20 1300   Number of days: 5       Peripheral IV 08/20/20 Right Lower forearm (Active)   Number of days: 0       Hemodialysis Vascular Access Arteriovenous fistula Left Forearm (Active)   Site Assessment [facility] 08/20/20 0721   Cannulation Needle Size 15 08/15/19 1200   Dressing Intervention New dressing 08/15/19 1429   Dressing Status Clean, dry, intact 01/26/20 0700   Verification by x-ray Not applicable 08/07/19 2035   Hand Off Report Yes 08/09/19 1719   Number of days:         Past Medical History:   Diagnosis Date     Anemia in chronic kidney disease      Dialysis patient (H)      End stage renal disease on dialysis (H)      Endocarditis      History of DVT (deep vein thrombosis) 2014     History of seizure 2014     Hypertension      Hypothyroid      Kidney transplanted 08/03/2019    DCD DDKT. Induction with thymo 6mg/kg.     Pituitary adenoma (H)      Pyelonephritis of " transplanted kidney 2020      Past Surgical History:   Procedure Laterality Date     BENCH KIDNEY N/A 8/3/2019    Procedure: BACKBENCH PREPARATION, ALLOGRAFT, KIDNEY;  Surgeon: Dorian Johnson MD;  Location: [facility] OR     COMBINED CYSTOSCOPY, RETROGRADES, URETEROSCOPY, LASER HOLMIUM LITHOTRIPSY URETER(S), INSERT STENT N/A 2019    Procedure: CYSTOURETEROSCOPY, WITH RETROGRADE PYELOGRAM of transplant kidney, STENT INSERTION, Laser on standby;  Surgeon: Wally Britt MD;  Location: [facility] OR     CREATE FISTULA ARTERIOVENOUS UPPER EXTREMITY  2014    Procedure: CREATE FISTULA ARTERIOVENOUS UPPER EXTREMITY;  Left Wrist Arteriovenous Fistula Placement;  Surgeon: Shashi Castro MD;  Location: [facility] OR     PERCUTANEOUS BIOPSY KIDNEY Right 2019    Procedure: Right Kidney Biopsy;  Surgeon: Deny Rios MD;  Location: [facility] OR     RASTA/DIALYSIS CATHETER  12/10/2013          TRANSPLANT KIDNEY RECIPIENT  DONOR N/A 8/3/2019    Procedure: TRANSPLANT, KIDNEY, RECIPIENT,  DONOR with Ureteral Stent Placement;  Surgeon: Dorian Johnson MD;  Location: [facility] OR      Allergies   Allergen Reactions     Contrast Dye Rash     CT contrast allergy 19 rash over eyes. Need to have pre medication before a CT WITH CONTRAST      Chlorhexidine Rash     Rash at site     Sulfa Drugs Rash     Muscle stiffness of neck     Dapsone      Methemoglobinemia     Furosemide Other (See Comments)     Skin flushing     Lisinopril Swelling     angioedema     Metrogel [Metronidazole]      Hives diffusely on body after vaginal administration.     Cefuroxime Rash        Anesthesia Evaluation     . Pt has had prior anesthetic. Type: General    No history of anesthetic complications          ROS/MED HX    ENT/Pulmonary:  - neg pulmonary ROS     Neurologic:     (+)seizures     Cardiovascular: Comment: H/o endocarditis    (+) hypertension----. : . . . :. .       METS/Exercise Tolerance:  4 - Raking leaves, gardening    Hematologic:  - neg hematologic  ROS       Musculoskeletal:  - neg musculoskeletal ROS       GI/Hepatic:  - neg GI/hepatic ROS       Renal/Genitourinary:     (+) chronic renal disease, type: ESRD, Pt does not require dialysis, Pt has history of transplant,       Endo:     (+) thyroid problem hypothyroidism, .      Psychiatric:  - neg psychiatric ROS       Infectious Disease:         Malignancy:         Other:                         PHYSICAL EXAM:   Mental Status/Neuro: A/A/O   Airway: Facies: Feasible  Mallampati: I  Mouth/Opening: Full  TM distance: > 6 cm  Neck ROM: Full   Respiratory: Auscultation: CTAB     Resp. Rate: Normal     Resp. Effort: Normal      CV: Rhythm: Regular  Rate: Age appropriate  Heart: Normal Sounds  Edema: None   Comments:      Dental: Normal Dentition                Assessment:   ASA SCORE: 3    H&P: History and physical reviewed and following examination; no interval change.   Smoking Status:  Non-Smoker/Unknown   NPO Status: NPO Appropriate     Plan:   Anes. 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                    Ignacio Ferraro MD, MD

## 2020-08-24 DIAGNOSIS — N39.0 RECURRENT UTI: ICD-10-CM

## 2020-08-24 DIAGNOSIS — Z79.899 ENCOUNTER FOR LONG-TERM CURRENT USE OF MEDICATION: ICD-10-CM

## 2020-08-24 DIAGNOSIS — Z94.0 KIDNEY REPLACED BY TRANSPLANT: Primary | ICD-10-CM

## 2020-08-24 DIAGNOSIS — Z48.298 AFTERCARE FOLLOWING ORGAN TRANSPLANT: ICD-10-CM

## 2020-08-24 DIAGNOSIS — Z94.0 KIDNEY REPLACED BY TRANSPLANT: ICD-10-CM

## 2020-08-24 LAB
ANION GAP SERPL CALCULATED.3IONS-SCNC: 6 MMOL/L (ref 3–14)
BUN SERPL-MCNC: 22 MG/DL (ref 7–30)
CALCIUM SERPL-MCNC: 9.5 MG/DL (ref 8.5–10.1)
CHLORIDE SERPL-SCNC: 111 MMOL/L (ref 94–109)
CO2 SERPL-SCNC: 22 MMOL/L (ref 20–32)
CREAT SERPL-MCNC: 1.02 MG/DL (ref 0.52–1.04)
ERYTHROCYTE [DISTWIDTH] IN BLOOD BY AUTOMATED COUNT: 12.8 % (ref 10–15)
GFR SERPL CREATININE-BSD FRML MDRD: 75 ML/MIN/{1.73_M2}
GLUCOSE SERPL-MCNC: 87 MG/DL (ref 70–99)
HCT VFR BLD AUTO: 37.8 % (ref 35–47)
HGB BLD-MCNC: 11.7 G/DL (ref 11.7–15.7)
MCH RBC QN AUTO: 28.2 PG (ref 26.5–33)
MCHC RBC AUTO-ENTMCNC: 31 G/DL (ref 31.5–36.5)
MCV RBC AUTO: 91 FL (ref 78–100)
PLATELET # BLD AUTO: 243 10E9/L (ref 150–450)
POTASSIUM SERPL-SCNC: 4 MMOL/L (ref 3.4–5.3)
RBC # BLD AUTO: 4.15 10E12/L (ref 3.8–5.2)
SODIUM SERPL-SCNC: 139 MMOL/L (ref 133–144)
TACROLIMUS BLD-MCNC: 6.4 UG/L (ref 5–15)
TME LAST DOSE: NORMAL H
WBC # BLD AUTO: 6.8 10E9/L (ref 4–11)

## 2020-08-24 PROCEDURE — 85027 COMPLETE CBC AUTOMATED: CPT | Performed by: INTERNAL MEDICINE

## 2020-08-24 PROCEDURE — 80197 ASSAY OF TACROLIMUS: CPT | Performed by: INTERNAL MEDICINE

## 2020-08-24 PROCEDURE — 80048 BASIC METABOLIC PNL TOTAL CA: CPT | Performed by: INTERNAL MEDICINE

## 2020-08-27 DIAGNOSIS — N13.5 STRICTURE OR KINKING OF URETER: Primary | ICD-10-CM

## 2020-08-31 DIAGNOSIS — N39.0 RECURRENT UTI: ICD-10-CM

## 2020-08-31 DIAGNOSIS — Z79.899 ENCOUNTER FOR LONG-TERM CURRENT USE OF MEDICATION: ICD-10-CM

## 2020-08-31 DIAGNOSIS — Z94.0 KIDNEY REPLACED BY TRANSPLANT: ICD-10-CM

## 2020-08-31 DIAGNOSIS — N18.30 CKD (CHRONIC KIDNEY DISEASE) STAGE 3, GFR 30-59 ML/MIN (H): Primary | ICD-10-CM

## 2020-08-31 DIAGNOSIS — Z48.298 AFTERCARE FOLLOWING ORGAN TRANSPLANT: ICD-10-CM

## 2020-08-31 LAB
ANION GAP SERPL CALCULATED.3IONS-SCNC: 3 MMOL/L (ref 3–14)
BUN SERPL-MCNC: 24 MG/DL (ref 7–30)
CALCIUM SERPL-MCNC: 9.6 MG/DL (ref 8.5–10.1)
CHLORIDE SERPL-SCNC: 112 MMOL/L (ref 94–109)
CO2 SERPL-SCNC: 25 MMOL/L (ref 20–32)
CREAT SERPL-MCNC: 1.04 MG/DL (ref 0.52–1.04)
ERYTHROCYTE [DISTWIDTH] IN BLOOD BY AUTOMATED COUNT: 13.1 % (ref 10–15)
GFR SERPL CREATININE-BSD FRML MDRD: 73 ML/MIN/{1.73_M2}
GLUCOSE SERPL-MCNC: 90 MG/DL (ref 70–99)
HCT VFR BLD AUTO: 38.3 % (ref 35–47)
HGB BLD-MCNC: 11.7 G/DL (ref 11.7–15.7)
MCH RBC QN AUTO: 27.9 PG (ref 26.5–33)
MCHC RBC AUTO-ENTMCNC: 30.5 G/DL (ref 31.5–36.5)
MCV RBC AUTO: 91 FL (ref 78–100)
PLATELET # BLD AUTO: 246 10E9/L (ref 150–450)
POTASSIUM SERPL-SCNC: 4.6 MMOL/L (ref 3.4–5.3)
RBC # BLD AUTO: 4.2 10E12/L (ref 3.8–5.2)
SODIUM SERPL-SCNC: 140 MMOL/L (ref 133–144)
TACROLIMUS BLD-MCNC: 6.4 UG/L (ref 5–15)
TME LAST DOSE: NORMAL H
WBC # BLD AUTO: 5.8 10E9/L (ref 4–11)

## 2020-08-31 PROCEDURE — 85027 COMPLETE CBC AUTOMATED: CPT | Performed by: INTERNAL MEDICINE

## 2020-08-31 PROCEDURE — 80048 BASIC METABOLIC PNL TOTAL CA: CPT | Performed by: INTERNAL MEDICINE

## 2020-08-31 PROCEDURE — 80197 ASSAY OF TACROLIMUS: CPT | Performed by: INTERNAL MEDICINE

## 2020-09-04 ENCOUNTER — MYC REFILL (OUTPATIENT)
Dept: TRANSPLANT | Facility: CLINIC | Age: 30
End: 2020-09-04

## 2020-09-04 DIAGNOSIS — Z94.0 KIDNEY REPLACED BY TRANSPLANT: ICD-10-CM

## 2020-09-04 NOTE — TELEPHONE ENCOUNTER
RECORDS RECEIVED FROM: Internal - Recurrent UTI (urinary tract infection) [N39.0]   DATE RECEIVED: 09.16.2020   NOTES (Gather within 2 years) STATUS DETAILS   OFFICE NOTE from referring provider   Internal 08.11.2020 Nakul Hill MD   OFFICE NOTE from other specialist Internal 08.19.2019 Dorian Johnson MD    DISCHARGE SUMMARY from hospital N/A    DISCHARGE REPORT from the ER N/A    LABS (any labs) Internal / CE    MEDICATION LIST Internal    IMAGING  (NEED IMAGES AND REPORTS)     Osteomyelitis: Foot imaging  N/A    Liver Abscess: Abdominal imaging N/A    Other (anything related to diagnoses N/A

## 2020-09-08 DIAGNOSIS — Z79.899 ENCOUNTER FOR LONG-TERM CURRENT USE OF MEDICATION: ICD-10-CM

## 2020-09-08 DIAGNOSIS — Z94.0 KIDNEY REPLACED BY TRANSPLANT: Primary | ICD-10-CM

## 2020-09-08 DIAGNOSIS — Z94.0 KIDNEY REPLACED BY TRANSPLANT: ICD-10-CM

## 2020-09-08 DIAGNOSIS — Z48.298 AFTERCARE FOLLOWING ORGAN TRANSPLANT: ICD-10-CM

## 2020-09-08 DIAGNOSIS — N39.0 RECURRENT UTI: ICD-10-CM

## 2020-09-08 LAB
ANION GAP SERPL CALCULATED.3IONS-SCNC: 8 MMOL/L (ref 3–14)
BUN SERPL-MCNC: 26 MG/DL (ref 7–30)
CALCIUM SERPL-MCNC: 10 MG/DL (ref 8.5–10.1)
CHLORIDE SERPL-SCNC: 108 MMOL/L (ref 94–109)
CO2 SERPL-SCNC: 23 MMOL/L (ref 20–32)
CREAT SERPL-MCNC: 1.07 MG/DL (ref 0.52–1.04)
ERYTHROCYTE [DISTWIDTH] IN BLOOD BY AUTOMATED COUNT: 13 % (ref 10–15)
GFR SERPL CREATININE-BSD FRML MDRD: 71 ML/MIN/{1.73_M2}
GLUCOSE SERPL-MCNC: 87 MG/DL (ref 70–99)
HCT VFR BLD AUTO: 41.3 % (ref 35–47)
HGB BLD-MCNC: 12.5 G/DL (ref 11.7–15.7)
MCH RBC QN AUTO: 27.7 PG (ref 26.5–33)
MCHC RBC AUTO-ENTMCNC: 30.3 G/DL (ref 31.5–36.5)
MCV RBC AUTO: 91 FL (ref 78–100)
PLATELET # BLD AUTO: 238 10E9/L (ref 150–450)
POTASSIUM SERPL-SCNC: 4.4 MMOL/L (ref 3.4–5.3)
RBC # BLD AUTO: 4.52 10E12/L (ref 3.8–5.2)
SODIUM SERPL-SCNC: 139 MMOL/L (ref 133–144)
TACROLIMUS BLD-MCNC: 7.4 UG/L (ref 5–15)
TME LAST DOSE: NORMAL H
WBC # BLD AUTO: 5.3 10E9/L (ref 4–11)

## 2020-09-08 PROCEDURE — 80197 ASSAY OF TACROLIMUS: CPT | Performed by: INTERNAL MEDICINE

## 2020-09-08 PROCEDURE — 80048 BASIC METABOLIC PNL TOTAL CA: CPT | Performed by: INTERNAL MEDICINE

## 2020-09-08 PROCEDURE — 85027 COMPLETE CBC AUTOMATED: CPT | Performed by: INTERNAL MEDICINE

## 2020-09-08 PROCEDURE — 87799 DETECT AGENT NOS DNA QUANT: CPT | Performed by: INTERNAL MEDICINE

## 2020-09-08 RX ORDER — MYCOPHENOLIC ACID 180 MG/1
540 TABLET, DELAYED RELEASE ORAL 2 TIMES DAILY
Qty: 180 TABLET | Refills: 11 | Status: SHIPPED | OUTPATIENT
Start: 2020-09-08 | End: 2021-08-17

## 2020-09-14 DIAGNOSIS — Z79.899 ENCOUNTER FOR LONG-TERM CURRENT USE OF MEDICATION: ICD-10-CM

## 2020-09-14 DIAGNOSIS — Z48.298 AFTERCARE FOLLOWING ORGAN TRANSPLANT: ICD-10-CM

## 2020-09-14 DIAGNOSIS — Z94.0 KIDNEY REPLACED BY TRANSPLANT: ICD-10-CM

## 2020-09-14 DIAGNOSIS — N39.0 RECURRENT UTI: ICD-10-CM

## 2020-09-14 DIAGNOSIS — Z94.0 KIDNEY REPLACED BY TRANSPLANT: Primary | ICD-10-CM

## 2020-09-14 LAB
ANION GAP SERPL CALCULATED.3IONS-SCNC: 5 MMOL/L (ref 3–14)
BUN SERPL-MCNC: 21 MG/DL (ref 7–30)
CALCIUM SERPL-MCNC: 9.5 MG/DL (ref 8.5–10.1)
CHLORIDE SERPL-SCNC: 108 MMOL/L (ref 94–109)
CO2 SERPL-SCNC: 23 MMOL/L (ref 20–32)
CREAT SERPL-MCNC: 1.03 MG/DL (ref 0.52–1.04)
ERYTHROCYTE [DISTWIDTH] IN BLOOD BY AUTOMATED COUNT: 13.2 % (ref 10–15)
GFR SERPL CREATININE-BSD FRML MDRD: 74 ML/MIN/{1.73_M2}
GLUCOSE SERPL-MCNC: 92 MG/DL (ref 70–99)
HCT VFR BLD AUTO: 38.8 % (ref 35–47)
HGB BLD-MCNC: 12 G/DL (ref 11.7–15.7)
MCH RBC QN AUTO: 27.8 PG (ref 26.5–33)
MCHC RBC AUTO-ENTMCNC: 30.9 G/DL (ref 31.5–36.5)
MCV RBC AUTO: 90 FL (ref 78–100)
PLATELET # BLD AUTO: 209 10E9/L (ref 150–450)
POTASSIUM SERPL-SCNC: 4.6 MMOL/L (ref 3.4–5.3)
RBC # BLD AUTO: 4.31 10E12/L (ref 3.8–5.2)
SODIUM SERPL-SCNC: 136 MMOL/L (ref 133–144)
TACROLIMUS BLD-MCNC: 6.3 UG/L (ref 5–15)
TME LAST DOSE: NORMAL H
WBC # BLD AUTO: 6.4 10E9/L (ref 4–11)

## 2020-09-14 PROCEDURE — 80048 BASIC METABOLIC PNL TOTAL CA: CPT | Performed by: INTERNAL MEDICINE

## 2020-09-14 PROCEDURE — 85027 COMPLETE CBC AUTOMATED: CPT | Performed by: INTERNAL MEDICINE

## 2020-09-14 PROCEDURE — 80197 ASSAY OF TACROLIMUS: CPT | Performed by: INTERNAL MEDICINE

## 2020-09-16 ENCOUNTER — PRE VISIT (OUTPATIENT)
Dept: INFECTIOUS DISEASES | Facility: CLINIC | Age: 30
End: 2020-09-16

## 2020-09-16 ENCOUNTER — VIRTUAL VISIT (OUTPATIENT)
Dept: INFECTIOUS DISEASES | Facility: CLINIC | Age: 30
End: 2020-09-16
Attending: STUDENT IN AN ORGANIZED HEALTH CARE EDUCATION/TRAINING PROGRAM
Payer: MEDICARE

## 2020-09-16 DIAGNOSIS — N39.0 RECURRENT UTI: Primary | ICD-10-CM

## 2020-09-16 DIAGNOSIS — Z23 NEED FOR VACCINATION: ICD-10-CM

## 2020-09-16 DIAGNOSIS — Z94.0 KIDNEY REPLACED BY TRANSPLANT: ICD-10-CM

## 2020-09-16 DIAGNOSIS — N13.30 HYDRONEPHROSIS OF KIDNEY TRANSPLANT: ICD-10-CM

## 2020-09-16 DIAGNOSIS — N13.5 STRICTURE OR KINKING OF URETER: ICD-10-CM

## 2020-09-16 DIAGNOSIS — T86.19 HYDRONEPHROSIS OF KIDNEY TRANSPLANT: ICD-10-CM

## 2020-09-16 ASSESSMENT — PAIN SCALES - GENERAL: PAINLEVEL: NO PAIN (0)

## 2020-09-16 NOTE — LETTER
"9/16/2020       RE: Mona Wagner  [address]  [place] [address]     Dear Colleague,    Thank you for referring your patient, Mona Wagner, to the [facility] AND INFECTIOUS DISEASES at [facility]. Please see a copy of my visit note below.    Mona Wagner is a 29 year old female who is being evaluated via a billable video visit.      The patient has been notified of following:     \"This video visit will be conducted via a call between you and your physician/provider. We have found that certain health care needs can be provided without the need for an in-person physical exam.  This service lets us provide the care you need with a video conversation.  If a prescription is necessary we can send it directly to your pharmacy.  If lab work is needed we can place an order for that and you can then stop by our lab to have the test done at a later time.    Video visits are billed at different rates depending on your insurance coverage.  Please reach out to your insurance provider with any questions.    If during the course of the call the physician/provider feels a video visit is not appropriate, you will not be charged for this service.\"    Patient has given verbal consent for Video visit? Yes  How would you like to obtain your AVS? MyChart  If you are dropped from the video visit, the video invite should be resent to: Send to e-mail at: [email or web address]  Will anyone else be joining your video visit? No        Video-Visit Details    Type of service:  Video Visit    Video Start Time: 3.14 pm  Video End Time: 3:55 PM  Chart review and care coordination time 20 mins   Total time 60 mins     Originating Location (pt. Location): Home    [facility]     Platform used for Video Visit: Prince Burns MD         Transplant Infectious Disease Consultation note  Today's Date: " 09/16/2020    Recommendations:  - to increase water intake from 1500 to 2000 ml a day   - discussed hygiene etiquettes  - to wash the area with soap and water after sexual intercourse   - to discuss with urology about recurrent UTIs even in the absence of stents. Recurrent UTIs with hydronephrosis in the absence of stricture suggests some other etiology and may need urodynamic studies for evaluation of cause.   - standing order for UA and urine culture placed and patient to drop off a urine sample if she develops UTI symptoms   - sent a prescription for augmentin for 10 days, to start if she develops UTI symptoms after dropping of a urine sample.   - to follow with urology if there could be an alternate etiology since the vesicoureteral stricture is not confirmed   - due for tdap vaccination, she will get it at next in person doctors visit.     Return visit in 2 months     Thank you for involving me in the care of this patient. Please do not hesitate to contact me with any questions.     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     Recurrent UTIs: Since transplant. Likely sec to vesicoureteral stricture. Then the stent (placed in Aug 2020) should help prevent UTIs. Will monitor and see how she does.     Meanwhile discussed increasing water intake to flush out organisms, hygiene etiquette, post sex voiding and washing (temporal relation).     Since the UTIs were symptomatic and most were managed as an outpatient, I think early initiation of abxs rather than prophylactic abxs will work in this case.     - Serostatus: CMV R+, EBV R+, VZV R+  - Immunization status: neeed Tdap vaccination, rcvd flu vaccine this season  - Prophylaxis: inhaled pentamidine last dose 7/17/20, absolute CD4 201 7/8/20    Attestation: I have  reviewed medications, labs and imaging. Reviewed medical history, surgical history, and family history in Epic History tab. No updates needed to be made.      Adore Burns  , [facility]  Pager - [phone]    -------------------------------------------------------------------------------------------------------------------   Reason for consult / Chief complaint:   Consulted by Dr. 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     Patient reports 4 UTIs since transplant   Dec 2019/Jan 2020 - developed hematuria, body aches, chills, fever  Feb 2020 - symptoms of urgency and burning  May and July 2020 symptoms urgency, pressure, burning,lower abdominal discomfort   She was hospitalized the first time and subsequent episodes were managed outpatient.     Diagnosed with UTI every time and prescribed different abxs with resolution of symptoms. UA generally did not show pyuria but urine cx grew >100k colonies of different organism each time eg klebsiella, E.coli.     Of note patient was found to have transplant kdieny  when patient developed transplanted kidney pain, no stricture was found but a ureteral stent was placed in Nov 2019. When she developed a kidney infection in Jan 2020 the stent was removed at that time. Due to recurrence of hydronephrosis in August 2020 another ureteral stent was placed. It appears that no stricture was found both times but in aug 2020 the CT reports probable vesicoureteral obstruction.     Patient Was diagnosed with Bacterial vaginosis in July and treated at an outside facility.     She developed dialysis related Blood clot while on estrogen progesterone containing pills, therefore is on  Progesterone only birth control pill. Patient feels like UTIs are occurring about 2 days after sexual  intercourse.     Social Hx:  Social History     Tobacco Use     Smoking status: Never Smoker     Smokeless tobacco: Never Used   Substance Use Topics     Alcohol use: No     Alcohol/week: 0.0 standard drinks     Drug use: No       Immunizations:  Immunization History   Administered Date(s) Administered     DTAP (<7y) 09/22/1997     Flu, Unspecified 11/01/2018     HEPA 12/31/2008, 09/04/2009     HPV 12/31/2008, 09/04/2009, 06/10/2010     HPV Quadrivalent 12/31/2008, 09/04/2009, 06/10/2010     Hep B, Peds or Adolescent 09/25/1997, 04/22/2005, 08/03/2005     HepA-Peds, Unspecified 09/04/2009     HepA-ped 2 Dose 12/31/2008     HepB 09/02/1997, 04/22/2005, 08/03/2005     Influenza (IIV3) PF 12/31/2008, 12/23/2013, 10/15/2015, 09/22/2016, 09/25/2017, 09/28/2018     Influenza Vaccine IM > 6 months Valent IIV4 09/19/2014, 10/07/2019, 09/03/2020     MMR 04/22/2005     Mantoux Tuberculin Skin Test 12/12/2013     Meningococcal (Menactra ) 09/04/2009     Meningococcal (Menomune ) 09/04/2009     OPV, trivalent, live 09/22/1997     Pneumococcal 23 valent 12/30/2013, 12/28/2018     Poliovirus, inactivated (IPV) 09/22/1997     TD (ADULT, 7+) 04/22/2005     TDAP Vaccine (Adacel) 09/04/2009     Tdap (Adacel,Boostrix) 09/04/2009     Varicella 12/31/2008, 09/04/2009       Allergies:   Allergies   Allergen Reactions     Contrast Dye Rash     CT contrast allergy 12/14/19 rash over eyes. Need to have pre medication before a CT WITH CONTRAST      Chlorhexidine Rash     Rash at site     Sulfa Drugs Rash     Muscle stiffness of neck     Dapsone      Methemoglobinemia     Furosemide Other (See Comments)     Skin flushing     Lisinopril Swelling     angioedema     Metrogel [Metronidazole]      Hives diffusely on body after vaginal administration.     Cefuroxime Rash       Medications:  Current Outpatient Medications   Medication     acetaminophen (TYLENOL) 325 MG tablet     aspirin (ASA) 81 MG chewable tablet     atorvastatin (LIPITOR) 10 MG  tablet     cinacalcet (SENSIPAR) 30 MG tablet     diphenhydrAMINE (BENADRYL) 25 MG capsule     EPINEPHrine (ANY BX GENERIC EQUIV) 0.3 MG/0.3ML injection 2-pack     fludrocortisone (FLORINEF) 0.1 MG tablet     hydrOXYzine (ATARAX) 10 MG tablet     lactobacillus rhamnosus, GG, (CULTURELL) capsule     levothyroxine (SYNTHROID/LEVOTHROID) 25 MCG tablet     magnesium oxide (MAG-OX) 400 MG tablet     MYFORTIC (BRAND) 180 MG EC tablet     norethindrone (MICRONOR) 0.35 MG tablet     oxybutynin (DITROPAN) 5 MG tablet     oxybutynin (DITROPAN) 5 MG tablet     oxyCODONE (ROXICODONE) 5 MG tablet     phenazopyridine (PYRIDIUM) 200 MG tablet     senna-docusate (SENOKOT-S/PERICOLACE) 8.6-50 MG tablet     simethicone (MYLICON) 125 MG chewable tablet     sodium bicarbonate 650 MG tablet     tacrolimus (GENERIC EQUIVALENT) 0.5 MG capsule     tacrolimus (GENERIC EQUIVALENT) 1 MG capsule     tamsulosin (FLOMAX) 0.4 MG capsule     tolterodine (DETROL) 2 MG tablet     No current facility-administered medications for this visit.        Past Medical Hx:  Past Medical History:   Diagnosis Date     Anemia in chronic kidney disease      Dialysis patient (H)      End stage renal disease on dialysis (H)      Endocarditis      History of DVT (deep vein thrombosis) 2014     History of seizure 2014     Hypertension      Hypothyroid      Kidney transplanted 08/03/2019    DCD DDKT. Induction with thymo 6mg/kg.     Pituitary adenoma (H)      Pyelonephritis of transplanted kidney 01/23/2020         Family History:  Family History   Problem Relation Age of Onset     Hypertension Sister      Diabetes Sister      Cerebrovascular Disease Sister      Kidney Disease Mother      Kidney Disease Sister      Cerebrovascular Disease Father      Coronary Artery Disease No family hx of      Breast Cancer No family hx of      Cancer - colorectal No family hx of      Ovarian Cancer No family hx of      Prostate Cancer No family hx of      Other Cancer No family hx  of      Asthma No family hx of      Anesthesia Reaction No family hx of      Deep Vein Thrombosis (DVT) No family hx of          Review of Systems:  10 systems reviewed pertinent positives noted in HPI.      Examination:  Unable to examine due to virtual visit.       Laboratory:  Hematology:  Recent Labs   Lab Test 09/14/20  0915 09/08/20  0930 08/31/20  0900   WBC 6.4 5.3 5.8   RBC 4.31 4.52 4.20   HGB 12.0 12.5 11.7   HCT 38.8 41.3 38.3   MCV 90 91 91   MCH 27.8 27.7 27.9   MCHC 30.9* 30.3* 30.5*   RDW 13.2 13.0 13.1    238 246       Chemistry:  Recent Labs   Lab Test 09/14/20  0915 09/08/20  0930 08/31/20  0900    139 140   POTASSIUM 4.6 4.4 4.6   CHLORIDE 108 108 112*   CO2 23 23 25   ANIONGAP 5 8 3   GLC 92 87 90   BUN 21 26 24   CR 1.03 1.07* 1.04   SHAMIR 9.5 10.0 9.6       Liver Function Studies:     Recent Labs   Lab Test 08/04/20  0905 02/03/20  1029 01/23/20  1301  12/03/13  0908   PROTTOTAL 7.9 7.4 8.1   < > 7.0   ALBUMIN 4.0 3.7 4.5   < > 3.7   BILITOTAL 0.7 0.6 1.2   < > 0.7   ALKPHOS 185* 126 210*   < >  --    AST 11 13 9   < >  --    ALT 19 20 20   < >  --    BILIDIRECT  --   --   --   --  0.2    < > = values in this interval not displayed.       Immune Globulin Studies:  No lab results found.     Results for LIZZETTE PALMER (MRN [id number]) as of 9/16/2020 20:28   Ref. Range 12/3/2019 12:15 12/20/2019 11:41 1/23/2020 14:21 2/17/2020 08:50 3/5/2020 08:50 5/22/2020 08:20 6/1/2020 08:56 7/27/2020 07:52 8/4/2020 09:05 8/11/2020 05:40 8/15/2020 12:25   Appearance Urine Unknown Clear Clear Clear Clear Clear Clear Clear Clear Clear Clear Clear   Glucose Urine Latest Ref Range: NEG^Negative mg/dL Negative Negative Negative Negative Negative Negative Negative Negative Negative Negative Negative   Bilirubin Urine Latest Ref Range: NEG^Negative  Negative Negative Negative Negative Negative Negative Negative Negative Negative Negative Negative   Ketones Urine Latest Ref Range: NEG^Negative mg/dL Negative  Negative Negative Negative Negative Negative Negative Negative Negative Negative Negative   Specific Gravity Urine Latest Ref Range: 1.003 - 1.035  1.010 1.008 1.014 1.012 1.013 1.012 1.007 1.007 1.009 1.018 1.012   pH Urine Latest Ref Range: 5.0 - 7.0 pH 8.0 (H) 7.0 6.0 6.5 6.5 7.0 6.5 6.5 6.5 5.0 7.5 (H)   Protein Albumin Urine Latest Ref Range: NEG^Negative mg/dL Negative Negative 30 (A) Negative Negative Negative Negative Negative Negative Negative Negative   Urobilinogen mg/dL Latest Ref Range: 0.0 - 2.0 mg/dL 0.0 0.0 Normal Normal Normal Normal Normal Normal Normal 0.0 Normal   Nitrite Urine Latest Ref Range: NEG^Negative  Negative Negative Negative Negative Negative Negative Negative Negative Negative Negative Negative   Blood Urine Latest Ref Range: NEG^Negative  Small (A) Large (A) Small (A) Small (A) Negative Negative Small (A) Negative Negative Negative Negative   Leukocyte Esterase Urine Latest Ref Range: NEG^Negative  Negative Trace (A) Large (A) Negative Negative Trace (A) Negative Trace (A) Negative Negative Negative   Source Unknown Midstream Urine Midstream Urine Midstream Urine Unspecified Urine Midstream Urine Urine Urine Unspecified Urine Urine Midstream Urine Clean catch urine   WBC Urine Latest Ref Range: 0 - 5 /HPF 1 3 12 (H) 2 1 2 <1 1 1 2 0   RBC Urine Latest Ref Range: 0 - 2 /HPF 1 2 13 (H) 3 (H) 1 1 <1 <1 <1 1 <1   Bacteria Urine Latest Ref Range: NEG^Negative /HPF  Few (A)    Few (A) Few (A) Few (A) Few (A)     Squamous Epithelial /HPF Urine Latest Ref Range: 0 - 1 /HPF <1 2 (H) 1 4 (H) 2 (H) 3 (H) <1 1 <1 1 1   Transitional Epi Latest Ref Range: 0 - 1 /HPF   <1 <1          Mucous Urine Latest Ref Range: NEG^Negative /LPF      Present (A)  Present (A)  Present (A)        Microbiology:    Urine cultures   1/23/20 50-100k E. Fecalis amp S  2/17/20 >100k Klebsiella pneumoniae (amp and macrobid R only), 10-50K E.coli (pan S)  5/22/20 >100k E.coli (amp resistant only), <10k group B  strep  7/27/20 10-50k group B strep  8/11/20 <10k group B strep  8/15/20 10-50k group B strep       CMV VL 1/23/20 neg     EBV DNA Copies/mL   Date Value Ref Range Status   01/25/2020 EBV DNA Not Detected EBVNEG^EBV DNA Not Detected [Copies]/mL Final       Imaging:  Renogram 8/18/20  Impression:   1. Normal perfusion of the transplant kidney with prompt uptake.  2. Compared to 11/13/2019, there is now spontaneous excretion by the  transplant kidney, although excretion is prolonged. This excludes  complete obstruction although there may be partial obstruction.  3. Moderate hydronephrosis.    CT abdomen pelvis 8/15/20  IMPRESSION:   1.  Moderate to severe hydronephrosis of the right lower quadrant  renal transplant with edema of the kidney and perinephric stranding  present. The degree of distention has similar to recent ultrasound  allowing for differences in modalities. No obstructing radiopaque  calculi identified but there does appear to be an abrupt change in  caliber in the region of the ureterovesical junction. Evaluation for  stricture or focal urothelial lesion in this region is not well  performed on this exam.   2.  Native kidneys are atrophic.    CT abdomen pelvis 12/14/19  IMPRESSION:  1. Right lower quadrant renal transplant with mild pyelocalyceal  dilation and prominent ureter, improved from the prior study. New  nephroureteral stent in place. Urothelial thickening with enhancement  likely due to post procedure/inflammatory changes. Mild periureteral  fat stranding. Mild perinephric fluid/fat stranding along the right  lower quadrant renal transplant.  2. Atrophic native kidneys with no hydronephrosis, stones or  suspicious masses in the present study.  3. Diffuse increased attenuation throughout the bones most consistent  with renal osteodystrophy.  4. Small pericardial effusion.  5. 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 Consider further evaluation with pelvic  MRI for better  Characterization.    11/13/19 Renogram   IMPRESSION:  Moderate hydronephrosis in the right lower quadrant renal transplant  with marked delayed washout favors at least partial outflow  obstruction. Acute tubular necrosis is a possibility but less likely.       Again, thank you for allowing me to participate in the care of your patient.      Sincerely,    Adore Burns MD

## 2020-09-16 NOTE — PROGRESS NOTES
"Mona Wagner is a 29 year old female who is being evaluated via a billable video visit.      The patient has been notified of following:     \"This video visit will be conducted via a call between you and your physician/provider. We have found that certain health care needs can be provided without the need for an in-person physical exam.  This service lets us provide the care you need with a video conversation.  If a prescription is necessary we can send it directly to your pharmacy.  If lab work is needed we can place an order for that and you can then stop by our lab to have the test done at a later time.    Video visits are billed at different rates depending on your insurance coverage.  Please reach out to your insurance provider with any questions.    If during the course of the call the physician/provider feels a video visit is not appropriate, you will not be charged for this service.\"    Patient has given verbal consent for Video visit? Yes  How would you like to obtain your AVS? MyChart  If you are dropped from the video visit, the video invite should be resent to: Send to e-mail at: [email or web address]  Will anyone else be joining your video visit? No        Video-Visit Details    Type of service:  Video Visit    Video Start Time: 3.14 pm  Video End Time: 3:55 PM  Chart review and care coordination time 20 mins   Total time 60 mins     Originating Location (pt. Location): Home    Distant Location (provider location):  [facility] AND INFECTIOUS DISEASES     Platform used for Video Visit: Prince Burns MD         Transplant Infectious Disease Consultation note  Today's Date: 09/16/2020    Recommendations:  - to increase water intake from 1500 to 2000 ml a day   - discussed hygiene etiquettes  - to wash the area with soap and water after sexual intercourse   - to discuss with urology about recurrent UTIs even in the absence of stents. Recurrent UTIs with hydronephrosis in the absence " of stricture suggests some other etiology and may need urodynamic studies for evaluation of cause.   - standing order for UA and urine culture placed and patient to drop off a urine sample if she develops UTI symptoms   - sent a prescription for augmentin for 10 days, to start if she develops UTI symptoms after dropping of a urine sample.   - to follow with urology if there could be an alternate etiology since the vesicoureteral stricture is not confirmed   - due for tdap vaccination, she will get it at next in person doctors visit.     Return visit in 2 months     Thank you for involving me in the care of this patient. Please do not hesitate to contact me with any questions.     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     Recurrent UTIs: Since transplant. Likely sec to vesicoureteral stricture. Then the stent (placed in Aug 2020) should help prevent UTIs. Will monitor and see how she does.     Meanwhile discussed increasing water intake to flush out organisms, hygiene etiquette, post sex voiding and washing (temporal relation).     Since the UTIs were symptomatic and most were managed as an outpatient, I think early initiation of abxs rather than prophylactic abxs will work in this case.     - Serostatus: CMV R+, EBV R+, VZV R+  - Immunization status: neeed Tdap vaccination, rcvd flu vaccine this season  - Prophylaxis: inhaled pentamidine last dose 7/17/20, absolute CD4 201 7/8/20    Attestation: I have reviewed medications, labs and imaging. Reviewed medical history, surgical history, and family history in Epic History tab. No updates needed to be made.      Adore Burns  , [facility]  Pager -  [phone]    -------------------------------------------------------------------------------------------------------------------   Reason for consult / Chief complaint:   Consulted by Dr. 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     Patient reports 4 UTIs since transplant   Dec 2019/Jan 2020 - developed hematuria, body aches, chills, fever  Feb 2020 - symptoms of urgency and burning  May and July 2020 symptoms urgency, pressure, burning,lower abdominal discomfort   She was hospitalized the first time and subsequent episodes were managed outpatient.     Diagnosed with UTI every time and prescribed different abxs with resolution of symptoms. UA generally did not show pyuria but urine cx grew >100k colonies of different organism each time eg klebsiella, E.coli.     Of note patient was found to have transplant kdieny  when patient developed transplanted kidney pain, no stricture was found but a ureteral stent was placed in Nov 2019. When she developed a kidney infection in Jan 2020 the stent was removed at that time. Due to recurrence of hydronephrosis in August 2020 another ureteral stent was placed. It appears that no stricture was found both times but in aug 2020 the CT reports probable vesicoureteral obstruction.     Patient Was diagnosed with Bacterial vaginosis in July and treated at an outside facility.     She developed dialysis related Blood clot while on estrogen progesterone containing pills, therefore is on  Progesterone only birth control pill. Patient feels like UTIs are occurring about 2 days after sexual intercourse.     Social Hx:  Social History     Tobacco Use     Smoking status: Never Smoker     Smokeless tobacco: Never Used   Substance Use Topics     Alcohol use: No     Alcohol/week: 0.0 standard drinks     Drug use: No        Immunizations:  Immunization History   Administered Date(s) Administered     DTAP (<7y) 09/22/1997     Flu, Unspecified 11/01/2018     HEPA 12/31/2008, 09/04/2009     HPV 12/31/2008, 09/04/2009, 06/10/2010     HPV Quadrivalent 12/31/2008, 09/04/2009, 06/10/2010     Hep B, Peds or Adolescent 09/25/1997, 04/22/2005, 08/03/2005     HepA-Peds, Unspecified 09/04/2009     HepA-ped 2 Dose 12/31/2008     HepB 09/02/1997, 04/22/2005, 08/03/2005     Influenza (IIV3) PF 12/31/2008, 12/23/2013, 10/15/2015, 09/22/2016, 09/25/2017, 09/28/2018     Influenza Vaccine IM > 6 months Valent IIV4 09/19/2014, 10/07/2019, 09/03/2020     MMR 04/22/2005     Mantoux Tuberculin Skin Test 12/12/2013     Meningococcal (Menactra ) 09/04/2009     Meningococcal (Menomune ) 09/04/2009     OPV, trivalent, live 09/22/1997     Pneumococcal 23 valent 12/30/2013, 12/28/2018     Poliovirus, inactivated (IPV) 09/22/1997     TD (ADULT, 7+) 04/22/2005     TDAP Vaccine (Adacel) 09/04/2009     Tdap (Adacel,Boostrix) 09/04/2009     Varicella 12/31/2008, 09/04/2009       Allergies:   Allergies   Allergen Reactions     Contrast Dye Rash     CT contrast allergy 12/14/19 rash over eyes. Need to have pre medication before a CT WITH CONTRAST      Chlorhexidine Rash     Rash at site     Sulfa Drugs Rash     Muscle stiffness of neck     Dapsone      Methemoglobinemia     Furosemide Other (See Comments)     Skin flushing     Lisinopril Swelling     angioedema     Metrogel [Metronidazole]      Hives diffusely on body after vaginal administration.     Cefuroxime Rash       Medications:  Current Outpatient Medications   Medication     acetaminophen (TYLENOL) 325 MG tablet     aspirin (ASA) 81 MG chewable tablet     atorvastatin (LIPITOR) 10 MG tablet     cinacalcet (SENSIPAR) 30 MG tablet     diphenhydrAMINE (BENADRYL) 25 MG capsule     EPINEPHrine (ANY BX GENERIC EQUIV) 0.3 MG/0.3ML injection 2-pack     fludrocortisone (FLORINEF) 0.1 MG tablet     hydrOXYzine  (ATARAX) 10 MG tablet     lactobacillus rhamnosus, GG, (CULTURELL) capsule     levothyroxine (SYNTHROID/LEVOTHROID) 25 MCG tablet     magnesium oxide (MAG-OX) 400 MG tablet     MYFORTIC (BRAND) 180 MG EC tablet     norethindrone (MICRONOR) 0.35 MG tablet     oxybutynin (DITROPAN) 5 MG tablet     oxybutynin (DITROPAN) 5 MG tablet     oxyCODONE (ROXICODONE) 5 MG tablet     phenazopyridine (PYRIDIUM) 200 MG tablet     senna-docusate (SENOKOT-S/PERICOLACE) 8.6-50 MG tablet     simethicone (MYLICON) 125 MG chewable tablet     sodium bicarbonate 650 MG tablet     tacrolimus (GENERIC EQUIVALENT) 0.5 MG capsule     tacrolimus (GENERIC EQUIVALENT) 1 MG capsule     tamsulosin (FLOMAX) 0.4 MG capsule     tolterodine (DETROL) 2 MG tablet     No current facility-administered medications for this visit.        Past Medical Hx:  Past Medical History:   Diagnosis Date     Anemia in chronic kidney disease      Dialysis patient (H)      End stage renal disease on dialysis (H)      Endocarditis      History of DVT (deep vein thrombosis) 2014     History of seizure 2014     Hypertension      Hypothyroid      Kidney transplanted 08/03/2019    DCD DDKT. Induction with thymo 6mg/kg.     Pituitary adenoma (H)      Pyelonephritis of transplanted kidney 01/23/2020         Family History:  Family History   Problem Relation Age of Onset     Hypertension Sister      Diabetes Sister      Cerebrovascular Disease Sister      Kidney Disease Mother      Kidney Disease Sister      Cerebrovascular Disease Father      Coronary Artery Disease No family hx of      Breast Cancer No family hx of      Cancer - colorectal No family hx of      Ovarian Cancer No family hx of      Prostate Cancer No family hx of      Other Cancer No family hx of      Asthma No family hx of      Anesthesia Reaction No family hx of      Deep Vein Thrombosis (DVT) No family hx of          Review of Systems:  10 systems reviewed pertinent positives noted in  HPI.      Examination:  Unable to examine due to virtual visit.       Laboratory:  Hematology:  Recent Labs   Lab Test 09/14/20  0915 09/08/20  0930 08/31/20  0900   WBC 6.4 5.3 5.8   RBC 4.31 4.52 4.20   HGB 12.0 12.5 11.7   HCT 38.8 41.3 38.3   MCV 90 91 91   MCH 27.8 27.7 27.9   MCHC 30.9* 30.3* 30.5*   RDW 13.2 13.0 13.1    238 246       Chemistry:  Recent Labs   Lab Test 09/14/20  0915 09/08/20  0930 08/31/20  0900    139 140   POTASSIUM 4.6 4.4 4.6   CHLORIDE 108 108 112*   CO2 23 23 25   ANIONGAP 5 8 3   GLC 92 87 90   BUN 21 26 24   CR 1.03 1.07* 1.04   SHAMIR 9.5 10.0 9.6       Liver Function Studies:     Recent Labs   Lab Test 08/04/20  0905 02/03/20  1029 01/23/20  1301  12/03/13  0908   PROTTOTAL 7.9 7.4 8.1   < > 7.0   ALBUMIN 4.0 3.7 4.5   < > 3.7   BILITOTAL 0.7 0.6 1.2   < > 0.7   ALKPHOS 185* 126 210*   < >  --    AST 11 13 9   < >  --    ALT 19 20 20   < >  --    BILIDIRECT  --   --   --   --  0.2    < > = values in this interval not displayed.       Immune Globulin Studies:  No lab results found.     Results for LIZZETTE PALMER (MRN [id number]) as of 9/16/2020 20:28   Ref. Range 12/3/2019 12:15 12/20/2019 11:41 1/23/2020 14:21 2/17/2020 08:50 3/5/2020 08:50 5/22/2020 08:20 6/1/2020 08:56 7/27/2020 07:52 8/4/2020 09:05 8/11/2020 05:40 8/15/2020 12:25   Appearance Urine Unknown Clear Clear Clear Clear Clear Clear Clear Clear Clear Clear Clear   Glucose Urine Latest Ref Range: NEG^Negative mg/dL Negative Negative Negative Negative Negative Negative Negative Negative Negative Negative Negative   Bilirubin Urine Latest Ref Range: NEG^Negative  Negative Negative Negative Negative Negative Negative Negative Negative Negative Negative Negative   Ketones Urine Latest Ref Range: NEG^Negative mg/dL Negative Negative Negative Negative Negative Negative Negative Negative Negative Negative Negative   Specific Gravity Urine Latest Ref Range: 1.003 - 1.035  1.010 1.008 1.014 1.012 1.013 1.012 1.007 1.007  1.009 1.018 1.012   pH Urine Latest Ref Range: 5.0 - 7.0 pH 8.0 (H) 7.0 6.0 6.5 6.5 7.0 6.5 6.5 6.5 5.0 7.5 (H)   Protein Albumin Urine Latest Ref Range: NEG^Negative mg/dL Negative Negative 30 (A) Negative Negative Negative Negative Negative Negative Negative Negative   Urobilinogen mg/dL Latest Ref Range: 0.0 - 2.0 mg/dL 0.0 0.0 Normal Normal Normal Normal Normal Normal Normal 0.0 Normal   Nitrite Urine Latest Ref Range: NEG^Negative  Negative Negative Negative Negative Negative Negative Negative Negative Negative Negative Negative   Blood Urine Latest Ref Range: NEG^Negative  Small (A) Large (A) Small (A) Small (A) Negative Negative Small (A) Negative Negative Negative Negative   Leukocyte Esterase Urine Latest Ref Range: NEG^Negative  Negative Trace (A) Large (A) Negative Negative Trace (A) Negative Trace (A) Negative Negative Negative   Source Unknown Midstream Urine Midstream Urine Midstream Urine Unspecified Urine Midstream Urine Urine Urine Unspecified Urine Urine Midstream Urine Clean catch urine   WBC Urine Latest Ref Range: 0 - 5 /HPF 1 3 12 (H) 2 1 2 <1 1 1 2 0   RBC Urine Latest Ref Range: 0 - 2 /HPF 1 2 13 (H) 3 (H) 1 1 <1 <1 <1 1 <1   Bacteria Urine Latest Ref Range: NEG^Negative /HPF  Few (A)    Few (A) Few (A) Few (A) Few (A)     Squamous Epithelial /HPF Urine Latest Ref Range: 0 - 1 /HPF <1 2 (H) 1 4 (H) 2 (H) 3 (H) <1 1 <1 1 1   Transitional Epi Latest Ref Range: 0 - 1 /HPF   <1 <1          Mucous Urine Latest Ref Range: NEG^Negative /LPF      Present (A)  Present (A)  Present (A)        Microbiology:    Urine cultures   1/23/20 50-100k E. Fecalis amp S  2/17/20 >100k Klebsiella pneumoniae (amp and macrobid R only), 10-50K E.coli (pan S)  5/22/20 >100k E.coli (amp resistant only), <10k group B strep  7/27/20 10-50k group B strep  8/11/20 <10k group B strep  8/15/20 10-50k group B strep       CMV VL 1/23/20 neg     EBV DNA Copies/mL   Date Value Ref Range Status   01/25/2020 EBV DNA Not Detected  EBVNEG^EBV DNA Not Detected [Copies]/mL Final       Imaging:  Renogram 8/18/20  Impression:   1. Normal perfusion of the transplant kidney with prompt uptake.  2. Compared to 11/13/2019, there is now spontaneous excretion by the  transplant kidney, although excretion is prolonged. This excludes  complete obstruction although there may be partial obstruction.  3. Moderate hydronephrosis.    CT abdomen pelvis 8/15/20  IMPRESSION:   1.  Moderate to severe hydronephrosis of the right lower quadrant  renal transplant with edema of the kidney and perinephric stranding  present. The degree of distention has similar to recent ultrasound  allowing for differences in modalities. No obstructing radiopaque  calculi identified but there does appear to be an abrupt change in  caliber in the region of the ureterovesical junction. Evaluation for  stricture or focal urothelial lesion in this region is not well  performed on this exam.   2.  Native kidneys are atrophic.    CT abdomen pelvis 12/14/19  IMPRESSION:  1. Right lower quadrant renal transplant with mild pyelocalyceal  dilation and prominent ureter, improved from the prior study. New  nephroureteral stent in place. Urothelial thickening with enhancement  likely due to post procedure/inflammatory changes. Mild periureteral  fat stranding. Mild perinephric fluid/fat stranding along the right  lower quadrant renal transplant.  2. Atrophic native kidneys with no hydronephrosis, stones or  suspicious masses in the present study.  3. Diffuse increased attenuation throughout the bones most consistent  with renal osteodystrophy.  4. Small pericardial effusion.  5. 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 Consider further evaluation with pelvic MRI for better  Characterization.    11/13/19 Renogram   IMPRESSION:  Moderate hydronephrosis in the right lower quadrant renal transplant  with marked delayed washout favors at least partial  outflow  obstruction. Acute tubular necrosis is a possibility but less likely.

## 2020-09-21 DIAGNOSIS — N39.0 RECURRENT UTI: ICD-10-CM

## 2020-09-21 DIAGNOSIS — Z79.899 ENCOUNTER FOR LONG-TERM CURRENT USE OF MEDICATION: ICD-10-CM

## 2020-09-21 DIAGNOSIS — Z94.0 KIDNEY REPLACED BY TRANSPLANT: Primary | ICD-10-CM

## 2020-09-21 DIAGNOSIS — Z94.0 KIDNEY REPLACED BY TRANSPLANT: ICD-10-CM

## 2020-09-21 DIAGNOSIS — Z48.298 AFTERCARE FOLLOWING ORGAN TRANSPLANT: ICD-10-CM

## 2020-09-21 LAB
ANION GAP SERPL CALCULATED.3IONS-SCNC: 6 MMOL/L (ref 3–14)
BUN SERPL-MCNC: 23 MG/DL (ref 7–30)
CALCIUM SERPL-MCNC: 9.8 MG/DL (ref 8.5–10.1)
CHLORIDE SERPL-SCNC: 109 MMOL/L (ref 94–109)
CO2 SERPL-SCNC: 23 MMOL/L (ref 20–32)
CREAT SERPL-MCNC: 0.98 MG/DL (ref 0.52–1.04)
ERYTHROCYTE [DISTWIDTH] IN BLOOD BY AUTOMATED COUNT: 12.8 % (ref 10–15)
GFR SERPL CREATININE-BSD FRML MDRD: 79 ML/MIN/{1.73_M2}
GLUCOSE SERPL-MCNC: 90 MG/DL (ref 70–99)
HCT VFR BLD AUTO: 39.1 % (ref 35–47)
HGB BLD-MCNC: 12.1 G/DL (ref 11.7–15.7)
MCH RBC QN AUTO: 27.9 PG (ref 26.5–33)
MCHC RBC AUTO-ENTMCNC: 30.9 G/DL (ref 31.5–36.5)
MCV RBC AUTO: 90 FL (ref 78–100)
PLATELET # BLD AUTO: 217 10E9/L (ref 150–450)
POTASSIUM SERPL-SCNC: 4.3 MMOL/L (ref 3.4–5.3)
RBC # BLD AUTO: 4.33 10E12/L (ref 3.8–5.2)
SODIUM SERPL-SCNC: 138 MMOL/L (ref 133–144)
TACROLIMUS BLD-MCNC: 5.9 UG/L (ref 5–15)
TME LAST DOSE: NORMAL H
WBC # BLD AUTO: 6.3 10E9/L (ref 4–11)

## 2020-09-21 PROCEDURE — 80197 ASSAY OF TACROLIMUS: CPT | Performed by: INTERNAL MEDICINE

## 2020-09-21 PROCEDURE — 85027 COMPLETE CBC AUTOMATED: CPT | Performed by: INTERNAL MEDICINE

## 2020-09-21 PROCEDURE — 80048 BASIC METABOLIC PNL TOTAL CA: CPT | Performed by: INTERNAL MEDICINE

## 2020-09-22 ENCOUNTER — HOSPITAL ENCOUNTER (OUTPATIENT)
Dept: NUCLEAR MEDICINE | Facility: CLINIC | Age: 30
Setting detail: NUCLEAR MEDICINE
Discharge: HOME | End: 2020-09-22
Attending: EMERGENCY MEDICINE | Admitting: EMERGENCY MEDICINE
Payer: MEDICARE

## 2020-09-22 DIAGNOSIS — N13.5 STRICTURE OR KINKING OF URETER: ICD-10-CM

## 2020-09-22 PROCEDURE — 78707 K FLOW/FUNCT IMAGE W/O DRUG: CPT

## 2020-09-22 PROCEDURE — 34300033 ZZH RX 343: Performed by: EMERGENCY MEDICINE

## 2020-09-22 PROCEDURE — A9562 TC99M MERTIATIDE: HCPCS | Performed by: EMERGENCY MEDICINE

## 2020-09-22 PROCEDURE — 40000141 ZZH STATISTIC PERIPHERAL IV START W/O US GUIDANCE

## 2020-09-22 RX ADMIN — TECHNESCAN TC 99M MERTIATIDE 10 MCI.: 1 INJECTION, POWDER, LYOPHILIZED, FOR SOLUTION INTRAVENOUS at 14:03

## 2020-09-28 DIAGNOSIS — Z94.0 KIDNEY REPLACED BY TRANSPLANT: Primary | ICD-10-CM

## 2020-09-28 DIAGNOSIS — Z79.899 ENCOUNTER FOR LONG-TERM CURRENT USE OF MEDICATION: ICD-10-CM

## 2020-09-28 DIAGNOSIS — Z48.298 AFTERCARE FOLLOWING ORGAN TRANSPLANT: ICD-10-CM

## 2020-09-28 DIAGNOSIS — Z94.0 KIDNEY REPLACED BY TRANSPLANT: ICD-10-CM

## 2020-09-28 DIAGNOSIS — N39.0 RECURRENT UTI: ICD-10-CM

## 2020-09-28 LAB
ANION GAP SERPL CALCULATED.3IONS-SCNC: 7 MMOL/L (ref 3–14)
BUN SERPL-MCNC: 23 MG/DL (ref 7–30)
CALCIUM SERPL-MCNC: 9.5 MG/DL (ref 8.5–10.1)
CHLORIDE SERPL-SCNC: 107 MMOL/L (ref 94–109)
CO2 SERPL-SCNC: 24 MMOL/L (ref 20–32)
CREAT SERPL-MCNC: 0.99 MG/DL (ref 0.52–1.04)
ERYTHROCYTE [DISTWIDTH] IN BLOOD BY AUTOMATED COUNT: 12.8 % (ref 10–15)
GFR SERPL CREATININE-BSD FRML MDRD: 78 ML/MIN/{1.73_M2}
GLUCOSE SERPL-MCNC: 89 MG/DL (ref 70–99)
HCT VFR BLD AUTO: 38.9 % (ref 35–47)
HGB BLD-MCNC: 12.2 G/DL (ref 11.7–15.7)
MCH RBC QN AUTO: 28.3 PG (ref 26.5–33)
MCHC RBC AUTO-ENTMCNC: 31.4 G/DL (ref 31.5–36.5)
MCV RBC AUTO: 90 FL (ref 78–100)
PLATELET # BLD AUTO: 226 10E9/L (ref 150–450)
POTASSIUM SERPL-SCNC: 4.4 MMOL/L (ref 3.4–5.3)
RBC # BLD AUTO: 4.31 10E12/L (ref 3.8–5.2)
SODIUM SERPL-SCNC: 138 MMOL/L (ref 133–144)
TACROLIMUS BLD-MCNC: 7.1 UG/L (ref 5–15)
TME LAST DOSE: NORMAL H
WBC # BLD AUTO: 6.9 10E9/L (ref 4–11)

## 2020-09-28 PROCEDURE — 87799 DETECT AGENT NOS DNA QUANT: CPT | Performed by: INTERNAL MEDICINE

## 2020-09-28 PROCEDURE — 85027 COMPLETE CBC AUTOMATED: CPT | Performed by: INTERNAL MEDICINE

## 2020-09-28 PROCEDURE — 80197 ASSAY OF TACROLIMUS: CPT | Performed by: INTERNAL MEDICINE

## 2020-09-28 PROCEDURE — 80048 BASIC METABOLIC PNL TOTAL CA: CPT | Performed by: INTERNAL MEDICINE

## 2020-10-01 ENCOUNTER — MYC MEDICAL ADVICE (OUTPATIENT)
Dept: UROLOGY | Facility: CLINIC | Age: 30
End: 2020-10-01

## 2020-10-02 ENCOUNTER — TELEPHONE (OUTPATIENT)
Dept: TRANSPLANT | Facility: CLINIC | Age: 30
End: 2020-10-02

## 2020-10-02 DIAGNOSIS — Z94.0 KIDNEY REPLACED BY TRANSPLANT: ICD-10-CM

## 2020-10-02 DIAGNOSIS — Z94.0 KIDNEY REPLACED BY TRANSPLANT: Primary | ICD-10-CM

## 2020-10-02 LAB
C COLI+JEJUNI+LARI FUSA STL QL NAA+PROBE: NORMAL
C DIFF TOX B STL QL: NEGATIVE
EC STX1 GENE STL QL NAA+PROBE: NORMAL
EC STX2 GENE STL QL NAA+PROBE: NORMAL
ENTERIC PATHOGEN COMMENT: NORMAL
NOROV GI+II ORF1-ORF2 JNC STL QL NAA+PR: NORMAL
RVA NSP5 STL QL NAA+PROBE: NORMAL
SALMONELLA SP RPOD STL QL NAA+PROBE: NORMAL
SHIGELLA SP+EIEC IPAH STL QL NAA+PROBE: NORMAL
SPECIMEN SOURCE: NORMAL
V CHOL+PARA RFBL+TRKH+TNAA STL QL NAA+PR: NORMAL
Y ENTERO RECN STL QL NAA+PROBE: NORMAL

## 2020-10-02 PROCEDURE — 87493 C DIFF AMPLIFIED PROBE: CPT | Performed by: INTERNAL MEDICINE

## 2020-10-02 PROCEDURE — 99207 PR NO BILLABLE SERVICE THIS VISIT: CPT

## 2020-10-02 NOTE — LETTER
PHYSICIAN ORDERS      DATE & TIME ISSUED: 2020 12:07 PM  PATIENT NAME: Mona Wagner   : 1990     [facility] MR# [if applicable]: [id number]     DIAGNOSIS:  Kidney transplant   ICD-10 CODE: Z94.0      Please complete the following labs  Cdiff and OLVIN (enteric stool study) due TODAY.   CMV PCR due next week.     Any questions please call: [phone] option 5     Please fax results to [phone].    .

## 2020-10-02 NOTE — TELEPHONE ENCOUNTER
Mona called with concern -   Loose stool have been present all week, but were exacerbated since yesterday.    Stools are now watery and green / yellow in color.  Odor is typical.   Cramping prior to BM, improved afterward. Is urgent.    Not on fiber right now.    BP is 102 / 73, as low as 80 / 60 .  Drinking about 3-4 bottles of water daily, 16oz bottle.      [facility] ADVISED:  Give 1L NS.  Send orders for Cdiff, OLVIN and CMV.    Mona voiced understanding.

## 2020-10-03 ENCOUNTER — TELEPHONE (OUTPATIENT)
Dept: TRANSPLANT | Facility: CLINIC | Age: 30
End: 2020-10-03

## 2020-10-03 DIAGNOSIS — R19.7 DIARRHEA: Primary | ICD-10-CM

## 2020-10-03 NOTE — TELEPHONE ENCOUNTER
Pt remaining with diarrhea. Pt wondered about stool cultures results. C Diff negative. Enteric needs to be redone as it states too old for testing. Patient feels she is having a hard time keeping fluid and food in her. Has stomach cramping. Wondering if can try loperamide and also if can get fluids in the [facility]. Per Dr Hill.patient to have   1 liter NS, orthostatic and have [facility] page DR Hill to determine if needs another liter of fluid. Patient to not use loperamide until repeats the enteric panel.   Lab is unable to process today or tomorrow at [facility]. Patient will have to do stool culture on Monday. Order placed.     Margarita, [facility], reports that they will be able to do on Sunday and will all us back with time.     Spoke with patient and she is having increasing abdominal pain that she is feeling she needs to go to the ER. The pain is a sheering pain in middle of abdomen.

## 2020-10-04 ENCOUNTER — INFUSION THERAPY VISIT (OUTPATIENT)
Dept: INFUSION THERAPY | Facility: CLINIC | Age: 30
End: 2020-10-04
Attending: NURSE PRACTITIONER
Payer: MEDICARE

## 2020-10-04 VITALS
OXYGEN SATURATION: 97 % | RESPIRATION RATE: 15 BRPM | SYSTOLIC BLOOD PRESSURE: 120 MMHG | HEART RATE: 79 BPM | DIASTOLIC BLOOD PRESSURE: 81 MMHG | TEMPERATURE: 98.1 F

## 2020-10-04 DIAGNOSIS — R19.7 DIARRHEA: ICD-10-CM

## 2020-10-04 DIAGNOSIS — Z48.298 AFTERCARE FOLLOWING ORGAN TRANSPLANT: ICD-10-CM

## 2020-10-04 DIAGNOSIS — Z94.0 KIDNEY REPLACED BY TRANSPLANT: Primary | ICD-10-CM

## 2020-10-04 PROCEDURE — 99207 PR NO CHARGE LOS: CPT

## 2020-10-04 PROCEDURE — 87506 IADNA-DNA/RNA PROBE TQ 6-11: CPT | Performed by: INTERNAL MEDICINE

## 2020-10-04 PROCEDURE — 96361 HYDRATE IV INFUSION ADD-ON: CPT

## 2020-10-04 PROCEDURE — 96360 HYDRATION IV INFUSION INIT: CPT

## 2020-10-04 PROCEDURE — 258N000003 HC RX IP 258 OP 636: Performed by: INTERNAL MEDICINE

## 2020-10-04 RX ADMIN — SODIUM CHLORIDE 1000 ML: 9 INJECTION, SOLUTION INTRAVENOUS at 09:33

## 2020-10-04 RX ADMIN — SODIUM CHLORIDE 1000 ML: 9 INJECTION, SOLUTION INTRAVENOUS at 10:55

## 2020-10-04 NOTE — PATIENT INSTRUCTIONS
Dear Mona Wagner    Thank you for choosing [facility] ([facility]) for your infusion.  The following information is a summary of our appointment as well as important reminders.      Please continue with fiber and start probiotics today.       We look forward in seeing you on your next appointment here at [facility]).  Please don t hesitate to call us at [phone] to reschedule any of your appointments or to speak with one of the [facility] registered nurses.  It was a pleasure taking care of you today.    Sincerely,    [facility]  [address] [place] [address]  [id number]  Phone:  [phone]

## 2020-10-05 DIAGNOSIS — Z94.0 KIDNEY REPLACED BY TRANSPLANT: Primary | ICD-10-CM

## 2020-10-05 DIAGNOSIS — Z48.298 AFTERCARE FOLLOWING ORGAN TRANSPLANT: ICD-10-CM

## 2020-10-05 DIAGNOSIS — N39.0 RECURRENT UTI: ICD-10-CM

## 2020-10-05 DIAGNOSIS — Z79.899 ENCOUNTER FOR LONG-TERM CURRENT USE OF MEDICATION: ICD-10-CM

## 2020-10-05 DIAGNOSIS — Z94.0 KIDNEY REPLACED BY TRANSPLANT: ICD-10-CM

## 2020-10-05 LAB
ANION GAP SERPL CALCULATED.3IONS-SCNC: 7 MMOL/L (ref 3–14)
BUN SERPL-MCNC: 14 MG/DL (ref 7–30)
C COLI+JEJUNI+LARI FUSA STL QL NAA+PROBE: ABNORMAL
CALCIUM SERPL-MCNC: 10 MG/DL (ref 8.5–10.1)
CHLORIDE SERPL-SCNC: 108 MMOL/L (ref 94–109)
CO2 SERPL-SCNC: 23 MMOL/L (ref 20–32)
CREAT SERPL-MCNC: 1.02 MG/DL (ref 0.52–1.04)
EC STX1 GENE STL QL NAA+PROBE: NOT DETECTED
EC STX2 GENE STL QL NAA+PROBE: NOT DETECTED
ENTERIC PATHOGEN COMMENT: ABNORMAL
ERYTHROCYTE [DISTWIDTH] IN BLOOD BY AUTOMATED COUNT: 12.7 % (ref 10–15)
GFR SERPL CREATININE-BSD FRML MDRD: 75 ML/MIN/{1.73_M2}
GLUCOSE SERPL-MCNC: 96 MG/DL (ref 70–99)
HCT VFR BLD AUTO: 38.4 % (ref 35–47)
HGB BLD-MCNC: 11.8 G/DL (ref 11.7–15.7)
MCH RBC QN AUTO: 27.9 PG (ref 26.5–33)
MCHC RBC AUTO-ENTMCNC: 30.7 G/DL (ref 31.5–36.5)
MCV RBC AUTO: 91 FL (ref 78–100)
NOROV GI+II ORF1-ORF2 JNC STL QL NAA+PR: NOT DETECTED
PLATELET # BLD AUTO: 242 10E9/L (ref 150–450)
POTASSIUM SERPL-SCNC: 4.2 MMOL/L (ref 3.4–5.3)
RBC # BLD AUTO: 4.23 10E12/L (ref 3.8–5.2)
RVA NSP5 STL QL NAA+PROBE: NOT DETECTED
SALMONELLA SP RPOD STL QL NAA+PROBE: NOT DETECTED
SHIGELLA SP+EIEC IPAH STL QL NAA+PROBE: NOT DETECTED
SODIUM SERPL-SCNC: 138 MMOL/L (ref 133–144)
TACROLIMUS BLD-MCNC: 4.7 UG/L (ref 5–15)
TME LAST DOSE: ABNORMAL H
V CHOL+PARA RFBL+TRKH+TNAA STL QL NAA+PR: NOT DETECTED
WBC # BLD AUTO: 7.3 10E9/L (ref 4–11)
Y ENTERO RECN STL QL NAA+PROBE: NOT DETECTED

## 2020-10-05 PROCEDURE — 80048 BASIC METABOLIC PNL TOTAL CA: CPT | Performed by: INTERNAL MEDICINE

## 2020-10-05 PROCEDURE — 80197 ASSAY OF TACROLIMUS: CPT | Performed by: INTERNAL MEDICINE

## 2020-10-05 PROCEDURE — 36415 COLL VENOUS BLD VENIPUNCTURE: CPT

## 2020-10-05 PROCEDURE — 87799 DETECT AGENT NOS DNA QUANT: CPT | Performed by: INTERNAL MEDICINE

## 2020-10-05 PROCEDURE — 85027 COMPLETE CBC AUTOMATED: CPT | Performed by: INTERNAL MEDICINE

## 2020-10-06 ENCOUNTER — TELEPHONE (OUTPATIENT)
Dept: TRANSPLANT | Facility: CLINIC | Age: 30
End: 2020-10-06

## 2020-10-06 DIAGNOSIS — Z94.0 KIDNEY TRANSPLANTED: Primary | ICD-10-CM

## 2020-10-06 LAB
CMV DNA SPEC NAA+PROBE-ACNC: NORMAL [IU]/ML
CMV DNA SPEC NAA+PROBE-LOG#: NORMAL {LOG_IU}/ML
SPECIMEN SOURCE: NORMAL

## 2020-10-06 RX ORDER — AZITHROMYCIN 500 MG/1
500 TABLET, FILM COATED ORAL DAILY
Qty: 5 TABLET | Refills: 0 | Status: SHIPPED | OUTPATIENT
Start: 2020-10-06 | End: 2020-10-09

## 2020-10-06 NOTE — TELEPHONE ENCOUNTER
They received the sample sent from the clinic -The order for stool culture for e pass was not put in preservative in the first 2 hrs  They will cancel the test  Needs new sample

## 2020-10-06 NOTE — TELEPHONE ENCOUNTER
Nakul Hill MD Schindelholz, Alanna, RN             Diarrhea likely caused by Campylobacter.  This is usually self-limited, but due to prolonged symptoms, would recommend treating with azithromycin 500 mg daily x 5 days.

## 2020-10-07 DIAGNOSIS — Z94.0 KIDNEY REPLACED BY TRANSPLANT: ICD-10-CM

## 2020-10-07 RX ORDER — ATORVASTATIN CALCIUM 10 MG/1
10 TABLET, FILM COATED ORAL DAILY
Qty: 90 TABLET | Refills: 3 | Status: SHIPPED | OUTPATIENT
Start: 2020-10-07 | End: 2021-07-26

## 2020-10-09 ENCOUNTER — OFFICE VISIT (OUTPATIENT)
Dept: UROLOGY | Facility: CLINIC | Age: 30
End: 2020-10-09
Payer: MEDICARE

## 2020-10-09 VITALS
SYSTOLIC BLOOD PRESSURE: 99 MMHG | BODY MASS INDEX: 28.28 KG/M2 | HEIGHT: 60 IN | DIASTOLIC BLOOD PRESSURE: 68 MMHG | HEART RATE: 116 BPM

## 2020-10-09 DIAGNOSIS — Z94.0 KIDNEY REPLACED BY TRANSPLANT: Primary | ICD-10-CM

## 2020-10-09 DIAGNOSIS — N13.5 STRICTURE OR KINKING OF URETER: Primary | ICD-10-CM

## 2020-10-09 DIAGNOSIS — N39.0 RECURRENT UTI: ICD-10-CM

## 2020-10-09 LAB
ALBUMIN UR-MCNC: NEGATIVE MG/DL
APPEARANCE UR: CLEAR
BILIRUB UR QL STRIP: NEGATIVE
COLOR UR AUTO: ABNORMAL
GLUCOSE UR STRIP-MCNC: NEGATIVE MG/DL
HGB UR QL STRIP: ABNORMAL
KETONES UR STRIP-MCNC: NEGATIVE MG/DL
LEUKOCYTE ESTERASE UR QL STRIP: ABNORMAL
NITRATE UR QL: NEGATIVE
PH UR STRIP: 7 PH (ref 5–7)
RBC #/AREA URNS AUTO: 0 /HPF (ref 0–2)
SOURCE: ABNORMAL
SP GR UR STRIP: 1 (ref 1–1.03)
SQUAMOUS #/AREA URNS AUTO: <1 /HPF (ref 0–1)
UROBILINOGEN UR STRIP-MCNC: NORMAL MG/DL (ref 0–2)
WBC #/AREA URNS AUTO: 2 /HPF (ref 0–5)

## 2020-10-09 PROCEDURE — 52310 CYSTOSCOPY AND TREATMENT: CPT | Performed by: UROLOGY

## 2020-10-09 PROCEDURE — 81001 URINALYSIS AUTO W/SCOPE: CPT | Performed by: STUDENT IN AN ORGANIZED HEALTH CARE EDUCATION/TRAINING PROGRAM

## 2020-10-09 PROCEDURE — 99207 PR NO BILLABLE SERVICE THIS VISIT: CPT

## 2020-10-09 PROCEDURE — 87086 URINE CULTURE/COLONY COUNT: CPT | Performed by: STUDENT IN AN ORGANIZED HEALTH CARE EDUCATION/TRAINING PROGRAM

## 2020-10-09 RX ORDER — CIPROFLOXACIN 500 MG/1
500 TABLET, FILM COATED ORAL ONCE
Status: CANCELLED | OUTPATIENT
Start: 2020-10-09 | End: 2020-10-09

## 2020-10-09 RX ORDER — LIDOCAINE HYDROCHLORIDE 20 MG/ML
10 JELLY TOPICAL ONCE
Status: CANCELLED | OUTPATIENT
Start: 2020-10-09 | End: 2020-10-09

## 2020-10-09 ASSESSMENT — PAIN SCALES - GENERAL
PAINLEVEL: NO PAIN (0)
PAINLEVEL: NO PAIN (0)

## 2020-10-09 NOTE — PROGRESS NOTES
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 Johnson c/b hydronephrosis, now s/p cystourethroscopy with retrograde pyelography, ureteroscopy and ureteral stent placement on 19. She had another cystoscopy retrograde pyelogram on 20 that showed mild hydronephrosis and no strictures in the transplant ureter.  Lasix Renogram with the stent showed a somewhat improved T1/2 (20) but was otherwise similar to pre-stent level.    Plan:  - Ureteral stent removal today  -Lasix Renogram  in 1 month.    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 Johnson complicated by hydronephrosis s/p cystourethroscopy with retrograde pyelography, ureteroscopy and ureteral stent placement on 2019. Imaging did not show any evidence of filling defects or strictures.    She had another cystoscopy retrograde pyelogram on 20 that showed mild hydronephrosis and no strictures in the transplant ureter.          RADIOLOGIC IMAGING  I reviewed the recent radiologic imaging and reports described below.  TRINY Britt  NM RENOGRAM 2020 3:28 PM      Comparison: CT abdomen 8/15/2020, Renogram 2019     History: Eval kidney; Other hydronephrosis     Dose: 10 mCi Tc-99m MAG3. Lasix could not be given due to allergy.     Findings: Planar images acquired of the right lower quadrant  transplant kidney during flow phase as well as every 5 minutes over 30  minutes. Additional delayed images were acquired at 60 minutes.     Flow phase demonstrates normal perfusion to the right lower  quadrant  transplant kidney with prompt uptake. Time to Tmax has improved to  about 3.5 minutes, previously 6.5 minutes on 11/13/2019. There is  spontaneous excretion by the transplant kidney, although excretion is  prolonged with eventual plateauing of excretion curve. Delayed images  demonstrate further washout of the transplant kidneys. There is  moderate hydronephrosis which persists on delayed images as well as  postvoid images.                                                                      Impression:   1. Normal perfusion of the transplant kidney with prompt uptake.  2. Compared to 11/13/2019, there is now spontaneous excretion by the  transplant kidney, although excretion is prolonged. This excludes  complete obstruction although there may be partial obstruction.  3. Moderate hydronephrosis.3          Nuclear Renal scan without Lasix: 9/22/2020 2:50 PM     Dose: 10 mCi 99M technetium MAG3     Renogram images demonstrate:     Transplant kidney: There is prominent cortical uptake in the right  lower quadrant renal transplant. Renogram curves demonstrate:     Transplant kidney: The radiotracer reaches the peak activity at 3  minutes, previously 3.5 minutes (time to peak). There is normal wash  out prior to Lasix, Lasix was not administered due to patient allergy.  Persistent, decreased hydronephrosis compared to previous.                                                                      IMPRESSION:  1. Normal perfusion of the transplant kidney with prompt uptake.  2. Grossly similar spontaneous excretion by the transplant kidney,  although prolonged.  3. Decreased hydronephrosis.       Review of Systems:   Pertinent items are noted in HPI or as below, remainder of complete ROS is negative.      CYSTOSCOPY PROCEDURE:  Sterile preparation and drape and an informed consent were performed.  A 16-Bolivian flexible cystoscope was introduced via the urethra.  The urethra was open.  The bladder mucosa showed no  evidence of any lesions or trabeculation.  There were no stones.  The stent was visualized and removed in entirety.  Antibiotic was given per clinic protocol.        Laboratory:   I reviewed all applicable laboratory and pathology data and went over findings with patient  Significant for     Lab Results   Component Value Date    CR 1.01 01/30/2020    CR 1.14 01/27/2020    CR 1.28 01/26/2020    CR 1.57 01/25/2020    CR 1.47 01/24/2020    CR 1.20 01/23/2020    CR 1.17 01/13/2020    CR 1.16 01/06/2020    CR 1.15 12/30/2019    CR 1.22 12/20/2019     Results for orders placed or performed in visit on 08/11/20   Routine UA with microscopic   Result Value Ref Range    Color Urine Yellow     Appearance Urine Clear     Glucose Urine Negative NEG^Negative mg/dL    Bilirubin Urine Negative NEG^Negative    Ketones Urine Negative NEG^Negative mg/dL    Specific Gravity Urine 1.018 1.003 - 1.035    Blood Urine Negative NEG^Negative    pH Urine 5.0 5.0 - 7.0 pH    Protein Albumin Urine Negative NEG^Negative mg/dL    Urobilinogen mg/dL 0.0 0.0 - 2.0 mg/dL    Nitrite Urine Negative NEG^Negative    Leukocyte Esterase Urine Negative NEG^Negative    Source Midstream Urine     WBC Urine 2 0 - 5 /HPF    RBC Urine 1 0 - 2 /HPF    Squamous Epithelial /HPF Urine 1 0 - 1 /HPF    Mucous Urine Present (A) NEG^Negative /LPF

## 2020-10-09 NOTE — LETTER
10/9/2020       RE: Mona Wagner  [address]  [place] [address]     Dear Colleague,    Thank you for referring your patient, Mona Wagner, to the [facility] [place] at [facility]. Please see a copy of my visit note below.    Chief Complaint   Patient presents with     RECHECK     stent removal        Maria Teresa Mak MA    Chief Complaint   Patient presents with     RECHECK     stent removal        Blood pressure 99/68, pulse 116, height 1.524 m (5'), not currently breastfeeding. Body mass index is 28.28 kg/m .    Patient Active Problem List   Diagnosis     DVT of upper extremity (deep vein thrombosis) (H)     Seizure disorder (H)     Galactorrhea     Acquired hypothyroidism     Increased prolactin level     Hypomagnesemia     Infective endocarditis-history of.  1/2019.  resolved.      Aftercare following organ transplant     Immunosuppression (H)     Methemoglobinemia     Kidney replaced by transplant     Anxiety     Secondary renal hyperparathyroidism (H)     Vitamin D deficiency     CKD (chronic kidney disease) stage 3, GFR 30-59 ml/min     Stricture or kinking of ureter     Pyelonephritis     Diarrhea     Bicornate uterus     Normal Pap 3/2020.  repeat 3/2023     Prophylactic antibiotic     Hydronephrosis     Hydronephrosis of kidney transplant       Allergies   Allergen Reactions     Contrast Dye Rash     CT contrast allergy 12/14/19 rash over eyes. Need to have pre medication before a CT WITH CONTRAST      Chlorhexidine Rash     Rash at site     Sulfa Drugs Rash     Muscle stiffness of neck     Dapsone      Methemoglobinemia     Furosemide Other (See Comments)     Skin flushing     Lisinopril Swelling     angioedema     Metrogel [Metronidazole]      Hives diffusely on body after vaginal administration.     Cefuroxime Rash       Current Outpatient Medications   Medication Sig Dispense Refill     acetaminophen (TYLENOL) 325 MG tablet Take 2  tablets (650 mg) by mouth every 4 hours as needed for mild pain 100 tablet 3     amoxicillin-clavulanate (AUGMENTIN) 875-125 MG tablet Take 1 tablet by mouth 2 times daily 20 tablet 2     aspirin (ASA) 81 MG chewable tablet CHEW AND SWALLOW 1 TABLET DAILY 36 tablet 10     atorvastatin (LIPITOR) 10 MG tablet Take 1 tablet (10 mg) by mouth daily 90 tablet 3     azithromycin (ZITHROMAX) 500 MG tablet Take 1 tablet (500 mg) by mouth daily 5 tablet 0     cinacalcet (SENSIPAR) 30 MG tablet Take 1 tablet (30 mg) by mouth daily 30 tablet 11     diphenhydrAMINE (BENADRYL) 25 MG capsule Take 1-2 tablets by mouth every 6 hours PRN itching/allergies 100 capsule 1     EPINEPHrine (ANY BX GENERIC EQUIV) 0.3 MG/0.3ML injection 2-pack Inject 0.3 mLs (0.3 mg) into the muscle as needed for anaphylaxis 2 each 1     fludrocortisone (FLORINEF) 0.1 MG tablet Take 1 tablet (0.1 mg) by mouth Every Mon, Wed, Fri Morning 180 tablet 3     hydrOXYzine (ATARAX) 10 MG tablet Take 1 tablet (10 mg) by mouth 3 times daily as needed for anxiety 20 tablet 0     lactobacillus rhamnosus, GG, (CULTURELL) capsule Take 1 capsule by mouth daily       levothyroxine (SYNTHROID/LEVOTHROID) 25 MCG tablet Take 1 tablet (25 mcg) Tuesday, Thursday, Saturday and Sunday and 1.5 tablets (37.5 mcg) on Monday, Wednesday and Friday every week. 105 tablet 4     magnesium oxide (MAG-OX) 400 MG tablet Take 1 tablet (400 mg) by mouth daily 180 tablet 0     MYFORTIC (BRAND) 180 MG EC tablet Take 3 tablets (540 mg) by mouth 2 times daily 180 tablet 11     norethindrone (MICRONOR) 0.35 MG tablet Do not restart until your follow up appointment with your surgeon. Discuss restart date at that appointment. 84 tablet 3     oxybutynin (DITROPAN) 5 MG tablet Take 1 tablet (5 mg) by mouth 3 times daily as needed for bladder spasms 45 tablet 0     oxybutynin (DITROPAN) 5 MG tablet Take 1 tablet (5 mg) by mouth 3 times daily as needed for bladder spasms 45 tablet 0     oxyCODONE  (ROXICODONE) 5 MG tablet Take 1-2 tablets (5-10 mg) by mouth every 4 hours as needed for moderate to severe pain 6 tablet 0     phenazopyridine (PYRIDIUM) 200 MG tablet Take 1 tablet (200 mg) by mouth 3 times daily as needed for irritation 9 tablet 0     senna-docusate (SENOKOT-S/PERICOLACE) 8.6-50 MG tablet Take 1-2 tablets by mouth 2 times daily 30 tablet 0     simethicone (MYLICON) 125 MG chewable tablet Take 1 tablet (125 mg) by mouth 4 times daily as needed for intestinal gas 120 tablet 4     sodium bicarbonate 650 MG tablet TAKE 3 TABLETS(1950 MG) BY MOUTH TWICE DAILY 180 tablet 11     tacrolimus (GENERIC EQUIVALENT) 0.5 MG capsule HOLD 60 capsule 11     tacrolimus (GENERIC EQUIVALENT) 1 MG capsule Take 3 capsules (3 mg) by mouth 2 times daily 180 capsule 11     tamsulosin (FLOMAX) 0.4 MG capsule Take 1 capsule (0.4 mg) by mouth daily 30 capsule 0     tolterodine (DETROL) 2 MG tablet Take 1 tablet (2 mg) by mouth every 12 hours as needed for incontinence (Spasms) 30 tablet 0       Social History     Tobacco Use     Smoking status: Never Smoker     Smokeless tobacco: Never Used   Substance Use Topics     Alcohol use: No     Alcohol/week: 0.0 standard drinks     Drug use: No       Invasive Procedure Safety Checklist:    Procedure: Cystoscopy    Action: Complete sections and checkboxes as appropriate.    Pre-procedure:  1. Patient ID Verified with 2 identifiers (Estela and  or MRN) : YES    2. Procedure and site verified with patient/designee (when able) : YES    3. Accurate consent documentation in medical record : YES    4. H&P (or appropriate assessment) documented in medical record : N/A  H&P must be up to 30 days prior to procedure an updated within 24 hours of                 Procedure as applicable.     5. Relevant diagnostic and radiology test results appropriately labeled and displayed as applicable : YES    6. Blood products, implants, devices, and/or special equipment available for the procedure as  applicable : YES    7. Procedure site(s) marked with provider initials [Exclusions: none] : NO    8. Marking not required. Reason : Yes  Procedure does not require site marking    Time Out:     Time-Out performed immediately prior to starting procedure, including verbal and active participation of all team members addressing: YES    1. Correct patient identity.  2. Confirmed that the correct side and site are marked.  3. An accurate procedure to be done.  4. Agreement on the procedure to be done.  5. Correct patient position.  6. Relevant images and results are properly labeled and appropriately displayed.  7. The need to administer antibiotics or fluids for irrigation purposes during the procedure as applicable.  8. Safety precautions based on patient history or medication use.    During Procedure: Verification of correct person, site, and procedure occurs any time the responsibility for care of the patient is transferred to another member of the care team.    The following medication was given:     MEDICATION: Lidocaine Uro-Jet 2% 200mg (20mg/mL)  ROUTE: Urethral   SITE: Urethra   DOSE: 10mL  LOT #: [id number]  : [employer]   EXPIRATION DATE:   NDC#: [address]-02   Was there drug waste? No    MEDICATION: Ciprofloxacin   ROUTE: PO  SITE: Oral  DOSE: 500mg  LOT #: [id number]  : [employer]  EXPIRATION DATE:   NDC#: [id number]  Was there drug waste? No    Prior to injection, verified patient identity using patient's name and date of birth.  Due to injection administration, patient instructed to remain in clinic for 15 minutes  afterwards, and to report any adverse reaction to me immediately.    Drug Amount Wasted:  None.  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 Johnson c/b hydronephrosis, now s/p cystourethroscopy with retrograde pyelography, ureteroscopy and ureteral stent placement on 12/6/19. She had another cystoscopy retrograde pyelogram on 8/20/20 that showed mild hydronephrosis and no strictures in the transplant ureter.  Lasix Renogram with the stent showed a somewhat improved T1/2 (9/22/20) but was otherwise similar to pre-stent level.    Plan:  - Ureteral stent removal today  -Lasix Renogram  in 1 month.    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 Johnson complicated by hydronephrosis s/p cystourethroscopy with retrograde pyelography, ureteroscopy and ureteral stent placement on 12/06/2019. Imaging did not show any evidence of filling defects or strictures.    She had another cystoscopy retrograde pyelogram on 8/20/20 that showed mild hydronephrosis and no strictures in the transplant ureter.          RADIOLOGIC IMAGING  I reviewed the recent radiologic imaging and reports described below.  TRINY Britt  NM RENOGRAM 8/18/2020 3:28 PM      Comparison: CT abdomen 8/15/2020, Renogram 11/13/2019     History: Eval kidney; Other hydronephrosis     Dose: 10 mCi Tc-99m MAG3. Lasix could not be given due to allergy.     Findings: Planar images acquired of the right lower quadrant  transplant kidney during flow phase as well as every 5 minutes over 30  minutes. Additional delayed images were acquired at 60 minutes.     Flow phase demonstrates normal perfusion to the right lower quadrant  transplant kidney with prompt uptake. Time to Tmax has improved to  about 3.5 minutes, previously 6.5 minutes on 11/13/2019. There is  spontaneous excretion by the transplant  kidney, although excretion is  prolonged with eventual plateauing of excretion curve. Delayed images  demonstrate further washout of the transplant kidneys. There is  moderate hydronephrosis which persists on delayed images as well as  postvoid images.                                                                      Impression:   1. Normal perfusion of the transplant kidney with prompt uptake.  2. Compared to 11/13/2019, there is now spontaneous excretion by the  transplant kidney, although excretion is prolonged. This excludes  complete obstruction although there may be partial obstruction.  3. Moderate hydronephrosis.3          Nuclear Renal scan without Lasix: 9/22/2020 2:50 PM     Dose: 10 mCi 99M technetium MAG3     Renogram images demonstrate:     Transplant kidney: There is prominent cortical uptake in the right  lower quadrant renal transplant. Renogram curves demonstrate:     Transplant kidney: The radiotracer reaches the peak activity at 3  minutes, previously 3.5 minutes (time to peak). There is normal wash  out prior to Lasix, Lasix was not administered due to patient allergy.  Persistent, decreased hydronephrosis compared to previous.                                                                      IMPRESSION:  1. Normal perfusion of the transplant kidney with prompt uptake.  2. Grossly similar spontaneous excretion by the transplant kidney,  although prolonged.  3. Decreased hydronephrosis.       Review of Systems:   Pertinent items are noted in HPI or as below, remainder of complete ROS is negative.      CYSTOSCOPY PROCEDURE:  Sterile preparation and drape and an informed consent were performed.  A 16-Yoruba flexible cystoscope was introduced via the urethra.  The urethra was open.  The bladder mucosa showed no evidence of any lesions or trabeculation.  There were no stones.  The stent was visualized and removed in entirety.  Antibiotic was given per clinic protocol.        Laboratory:   I  reviewed all applicable laboratory and pathology data and went over findings with patient  Significant for     Lab Results   Component Value Date    CR 1.01 01/30/2020    CR 1.14 01/27/2020    CR 1.28 01/26/2020    CR 1.57 01/25/2020    CR 1.47 01/24/2020    CR 1.20 01/23/2020    CR 1.17 01/13/2020    CR 1.16 01/06/2020    CR 1.15 12/30/2019    CR 1.22 12/20/2019     Results for orders placed or performed in visit on 08/11/20   Routine UA with microscopic   Result Value Ref Range    Color Urine Yellow     Appearance Urine Clear     Glucose Urine Negative NEG^Negative mg/dL    Bilirubin Urine Negative NEG^Negative    Ketones Urine Negative NEG^Negative mg/dL    Specific Gravity Urine 1.018 1.003 - 1.035    Blood Urine Negative NEG^Negative    pH Urine 5.0 5.0 - 7.0 pH    Protein Albumin Urine Negative NEG^Negative mg/dL    Urobilinogen mg/dL 0.0 0.0 - 2.0 mg/dL    Nitrite Urine Negative NEG^Negative    Leukocyte Esterase Urine Negative NEG^Negative    Source Midstream Urine     WBC Urine 2 0 - 5 /HPF    RBC Urine 1 0 - 2 /HPF    Squamous Epithelial /HPF Urine 1 0 - 1 /HPF    Mucous Urine Present (A) NEG^Negative /LPF

## 2020-10-09 NOTE — PATIENT INSTRUCTIONS
Please follow up in 1 month with imaging before     It was a pleasure meeting with you today.  Thank you for allowing me and my team the privilege of caring for you today.  YOU are the reason we are here, and I truly hope we provided you with the excellent service you deserve.  Please let us know if there is anything else we can do for you so that we can be sure you are leaving completely satisfied with your care experience.

## 2020-10-09 NOTE — PROGRESS NOTES
Chief Complaint   Patient presents with     RECHECK     stent removal        Maria Teresa aMk MA    Chief Complaint   Patient presents with     RECHECK     stent removal        Blood pressure 99/68, pulse 116, height 1.524 m (5'), not currently breastfeeding. Body mass index is 28.28 kg/m .    Patient Active Problem List   Diagnosis     DVT of upper extremity (deep vein thrombosis) (H)     Seizure disorder (H)     Galactorrhea     Acquired hypothyroidism     Increased prolactin level     Hypomagnesemia     Infective endocarditis-history of.  1/2019.  resolved.      Aftercare following organ transplant     Immunosuppression (H)     Methemoglobinemia     Kidney replaced by transplant     Anxiety     Secondary renal hyperparathyroidism (H)     Vitamin D deficiency     CKD (chronic kidney disease) stage 3, GFR 30-59 ml/min     Stricture or kinking of ureter     Pyelonephritis     Diarrhea     Bicornate uterus     Normal Pap 3/2020.  repeat 3/2023     Prophylactic antibiotic     Hydronephrosis     Hydronephrosis of kidney transplant       Allergies   Allergen Reactions     Contrast Dye Rash     CT contrast allergy 12/14/19 rash over eyes. Need to have pre medication before a CT WITH CONTRAST      Chlorhexidine Rash     Rash at site     Sulfa Drugs Rash     Muscle stiffness of neck     Dapsone      Methemoglobinemia     Furosemide Other (See Comments)     Skin flushing     Lisinopril Swelling     angioedema     Metrogel [Metronidazole]      Hives diffusely on body after vaginal administration.     Cefuroxime Rash       Current Outpatient Medications   Medication Sig Dispense Refill     acetaminophen (TYLENOL) 325 MG tablet Take 2 tablets (650 mg) by mouth every 4 hours as needed for mild pain 100 tablet 3     amoxicillin-clavulanate (AUGMENTIN) 875-125 MG tablet Take 1 tablet by mouth 2 times daily 20 tablet 2     aspirin (ASA) 81 MG chewable tablet CHEW AND SWALLOW 1 TABLET DAILY 36 tablet 10     atorvastatin (LIPITOR) 10  MG tablet Take 1 tablet (10 mg) by mouth daily 90 tablet 3     azithromycin (ZITHROMAX) 500 MG tablet Take 1 tablet (500 mg) by mouth daily 5 tablet 0     cinacalcet (SENSIPAR) 30 MG tablet Take 1 tablet (30 mg) by mouth daily 30 tablet 11     diphenhydrAMINE (BENADRYL) 25 MG capsule Take 1-2 tablets by mouth every 6 hours PRN itching/allergies 100 capsule 1     EPINEPHrine (ANY BX GENERIC EQUIV) 0.3 MG/0.3ML injection 2-pack Inject 0.3 mLs (0.3 mg) into the muscle as needed for anaphylaxis 2 each 1     fludrocortisone (FLORINEF) 0.1 MG tablet Take 1 tablet (0.1 mg) by mouth Every Mon, Wed, Fri Morning 180 tablet 3     hydrOXYzine (ATARAX) 10 MG tablet Take 1 tablet (10 mg) by mouth 3 times daily as needed for anxiety 20 tablet 0     lactobacillus rhamnosus, GG, (CULTURELL) capsule Take 1 capsule by mouth daily       levothyroxine (SYNTHROID/LEVOTHROID) 25 MCG tablet Take 1 tablet (25 mcg) Tuesday, Thursday, Saturday and Sunday and 1.5 tablets (37.5 mcg) on Monday, Wednesday and Friday every week. 105 tablet 4     magnesium oxide (MAG-OX) 400 MG tablet Take 1 tablet (400 mg) by mouth daily 180 tablet 0     MYFORTIC (BRAND) 180 MG EC tablet Take 3 tablets (540 mg) by mouth 2 times daily 180 tablet 11     norethindrone (MICRONOR) 0.35 MG tablet Do not restart until your follow up appointment with your surgeon. Discuss restart date at that appointment. 84 tablet 3     oxybutynin (DITROPAN) 5 MG tablet Take 1 tablet (5 mg) by mouth 3 times daily as needed for bladder spasms 45 tablet 0     oxybutynin (DITROPAN) 5 MG tablet Take 1 tablet (5 mg) by mouth 3 times daily as needed for bladder spasms 45 tablet 0     oxyCODONE (ROXICODONE) 5 MG tablet Take 1-2 tablets (5-10 mg) by mouth every 4 hours as needed for moderate to severe pain 6 tablet 0     phenazopyridine (PYRIDIUM) 200 MG tablet Take 1 tablet (200 mg) by mouth 3 times daily as needed for irritation 9 tablet 0     senna-docusate (SENOKOT-S/PERICOLACE) 8.6-50 MG  tablet Take 1-2 tablets by mouth 2 times daily 30 tablet 0     simethicone (MYLICON) 125 MG chewable tablet Take 1 tablet (125 mg) by mouth 4 times daily as needed for intestinal gas 120 tablet 4     sodium bicarbonate 650 MG tablet TAKE 3 TABLETS(1950 MG) BY MOUTH TWICE DAILY 180 tablet 11     tacrolimus (GENERIC EQUIVALENT) 0.5 MG capsule HOLD 60 capsule 11     tacrolimus (GENERIC EQUIVALENT) 1 MG capsule Take 3 capsules (3 mg) by mouth 2 times daily 180 capsule 11     tamsulosin (FLOMAX) 0.4 MG capsule Take 1 capsule (0.4 mg) by mouth daily 30 capsule 0     tolterodine (DETROL) 2 MG tablet Take 1 tablet (2 mg) by mouth every 12 hours as needed for incontinence (Spasms) 30 tablet 0       Social History     Tobacco Use     Smoking status: Never Smoker     Smokeless tobacco: Never Used   Substance Use Topics     Alcohol use: No     Alcohol/week: 0.0 standard drinks     Drug use: No       Invasive Procedure Safety Checklist:    Procedure: Cystoscopy    Action: Complete sections and checkboxes as appropriate.    Pre-procedure:  1. Patient ID Verified with 2 identifiers (Estela and  or MRN) : YES    2. Procedure and site verified with patient/designee (when able) : YES    3. Accurate consent documentation in medical record : YES    4. H&P (or appropriate assessment) documented in medical record : N/A  H&P must be up to 30 days prior to procedure an updated within 24 hours of                 Procedure as applicable.     5. Relevant diagnostic and radiology test results appropriately labeled and displayed as applicable : YES    6. Blood products, implants, devices, and/or special equipment available for the procedure as applicable : YES    7. Procedure site(s) marked with provider initials [Exclusions: none] : NO    8. Marking not required. Reason : Yes  Procedure does not require site marking    Time Out:     Time-Out performed immediately prior to starting procedure, including verbal and active participation of all  team members addressing: YES    1. Correct patient identity.  2. Confirmed that the correct side and site are marked.  3. An accurate procedure to be done.  4. Agreement on the procedure to be done.  5. Correct patient position.  6. Relevant images and results are properly labeled and appropriately displayed.  7. The need to administer antibiotics or fluids for irrigation purposes during the procedure as applicable.  8. Safety precautions based on patient history or medication use.    During Procedure: Verification of correct person, site, and procedure occurs any time the responsibility for care of the patient is transferred to another member of the care team.    The following medication was given:     MEDICATION: Lidocaine Uro-Jet 2% 200mg (20mg/mL)  ROUTE: Urethral   SITE: Urethra   DOSE: 10mL  LOT #: [id number]  : [employer]   EXPIRATION DATE: 6/22  NDC#: [address]-01   Was there drug waste? No    MEDICATION: Ciprofloxacin   ROUTE: PO  SITE: Oral  DOSE: 500mg  LOT #: [id number]  : [employer]  EXPIRATION DATE: 12/21  NDC#: [id number]  Was there drug waste? No    Prior to injection, verified patient identity using patient's name and date of birth.  Due to injection administration, patient instructed to remain in clinic for 15 minutes  afterwards, and to report any adverse reaction to me immediately.    Drug Amount Wasted:  None.  Vial/Syringe: Single dose vial      Maria Teresa Mak CMA  10/9/2020  8:48 AM

## 2020-10-09 NOTE — TELEPHONE ENCOUNTER
ISSUE: Abdominal Rash, Lightheaded/Dizziness started azithromycin 500 mg Day 3 of 5 for diarrhea likely caused by Campylobacter found on enteric stool sample left 10/4/20.    Oriensehart Message from Patient:  Hi Dr hill,  Today is my third day on the azithromycin 500, and today after taking the medications I started feeling light headed and dizziness so I fell asleep most of the day, when I woke up in the evening I noticed a skin rash or spots all over my abdomen and felt some heat around the abdomen as well. Could I discontinue the antibiotic? My diarrhea has subsided with the fiber intake and my abdominal pain has disappeared as well. Please let me know if I should stop the med. I've taken Benadryl for the rash      Mona    PLAN: Staff message sent to Dr. Hill to inquire about alternative antibiotic.    OUTCOME:  RE: Antibiotic Reaction  Received: Today  Message Contents   Nakul Hill MD [place], Ophelia Gamez, ERMELINDA             No, would just stop antibiotic.      Call placed to Mona. She will did not take the medication today and will stop it altogether. Notes that the dizziness and rash occurred after she took azithromycin. If symptoms return, this writer advised she follow up with her primary care provider. Medication added to allergy list.

## 2020-10-10 LAB
BACTERIA SPEC CULT: NO GROWTH
Lab: NORMAL
SPECIMEN SOURCE: NORMAL

## 2020-10-19 ENCOUNTER — TELEPHONE (OUTPATIENT)
Dept: NEPHROLOGY | Facility: CLINIC | Age: 30
End: 2020-10-19

## 2020-10-19 NOTE — LETTER
OUTPATIENT LABORATORY TEST ORDER    Patient Name: Mona Wagner  Transplant Date: 8/3/2019   YOB: 1990                                Issue Date & Time:10/19/2020  12:10 PM  [facility] MR: [id number] Exp. Date (1 year after date issued)      Diagnoses: Kidney Transplant (ICD-10  Z94.0)   Long term use of medications (ICD-10  Z79.899)     Lab results to be available on the same day drawn.   Patient should release information to the [facility].  Please fax to the Transplant Center at [phone].    Monthly   ?Hemogram and Platelet  ?Basic Metabolic Panel (Sodium, Potassium, Chloride, CO2, Creatinine, Urea Nitrogen,     Glucose,   Calcium)        ?/Tacrolimus/Prograf drug level         ?BK (Polyoma Virus) PCR Quantitative - Plasma                     Every 6 Months                  ?Urine for protein/creatinine    Yearly:   ? PRA/DSA level (mailers provided by the patient)     If you have any questions, please call The [facility]plant Center at [phone] or [phone].    Please fax labs to [phone]  .

## 2020-10-19 NOTE — TELEPHONE ENCOUNTER
[facility] Call Center    Phone Message    May a detailed message be left on voicemail: yes     Reason for Call: Order(s): Other:   Reason for requested: Pt is requesting for their lab orders to be sent to the [facility] in [place].  Date needed: asap  Provider name: Dr. Hill      Action Taken: Message routed to:  [facility] neph    Travel Screening: Not Applicable

## 2020-10-21 DIAGNOSIS — M54.9 BACK PAIN, UNSPECIFIED BACK LOCATION, UNSPECIFIED BACK PAIN LATERALITY, UNSPECIFIED CHRONICITY: ICD-10-CM

## 2020-10-23 RX ORDER — ACETAMINOPHEN 325 MG/1
650 TABLET ORAL EVERY 4 HOURS PRN
Qty: 100 TABLET | Refills: 0 | Status: SHIPPED | OUTPATIENT
Start: 2020-10-23 | End: 2021-03-26

## 2020-10-29 ENCOUNTER — MYC MEDICAL ADVICE (OUTPATIENT)
Dept: FAMILY MEDICINE | Facility: CLINIC | Age: 30
End: 2020-10-29

## 2020-10-29 DIAGNOSIS — B96.89 BACTERIAL VAGINOSIS: Primary | ICD-10-CM

## 2020-10-29 DIAGNOSIS — N76.0 BACTERIAL VAGINOSIS: Primary | ICD-10-CM

## 2020-10-30 RX ORDER — CLINDAMYCIN PHOSPHATE 20 MG/G
1 CREAM VAGINAL AT BEDTIME
Qty: 40 G | Refills: 0 | Status: SHIPPED | OUTPATIENT
Start: 2020-10-30 | End: 2020-11-04

## 2020-11-03 DIAGNOSIS — N39.0 RECURRENT UTI: ICD-10-CM

## 2020-11-03 DIAGNOSIS — Z79.899 ENCOUNTER FOR LONG-TERM CURRENT USE OF MEDICATION: ICD-10-CM

## 2020-11-03 DIAGNOSIS — Z94.0 KIDNEY REPLACED BY TRANSPLANT: Primary | ICD-10-CM

## 2020-11-03 DIAGNOSIS — Z48.298 AFTERCARE FOLLOWING ORGAN TRANSPLANT: ICD-10-CM

## 2020-11-03 DIAGNOSIS — Z94.0 KIDNEY REPLACED BY TRANSPLANT: ICD-10-CM

## 2020-11-03 LAB
ANION GAP SERPL CALCULATED.3IONS-SCNC: 9 MMOL/L (ref 3–14)
BUN SERPL-MCNC: 21 MG/DL (ref 7–30)
CALCIUM SERPL-MCNC: 9.3 MG/DL (ref 8.5–10.1)
CHLORIDE SERPL-SCNC: 110 MMOL/L (ref 94–109)
CO2 SERPL-SCNC: 20 MMOL/L (ref 20–32)
CREAT SERPL-MCNC: 0.99 MG/DL (ref 0.52–1.04)
ERYTHROCYTE [DISTWIDTH] IN BLOOD BY AUTOMATED COUNT: 13.2 % (ref 10–15)
GFR SERPL CREATININE-BSD FRML MDRD: 78 ML/MIN/{1.73_M2}
GLUCOSE SERPL-MCNC: 86 MG/DL (ref 70–99)
HCT VFR BLD AUTO: 37.9 % (ref 35–47)
HGB BLD-MCNC: 11.8 G/DL (ref 11.7–15.7)
MAGNESIUM SERPL-MCNC: 1.5 MG/DL (ref 1.6–2.3)
MCH RBC QN AUTO: 28.4 PG (ref 26.5–33)
MCHC RBC AUTO-ENTMCNC: 31.1 G/DL (ref 31.5–36.5)
MCV RBC AUTO: 91 FL (ref 78–100)
PHOSPHATE SERPL-MCNC: 2.6 MG/DL (ref 2.5–4.5)
PLATELET # BLD AUTO: 241 10E9/L (ref 150–450)
POTASSIUM SERPL-SCNC: 4.2 MMOL/L (ref 3.4–5.3)
RBC # BLD AUTO: 4.16 10E12/L (ref 3.8–5.2)
SODIUM SERPL-SCNC: 139 MMOL/L (ref 133–144)
TACROLIMUS BLD-MCNC: 6.8 UG/L (ref 5–15)
TME LAST DOSE: NORMAL H
WBC # BLD AUTO: 6.5 10E9/L (ref 4–11)

## 2020-11-03 PROCEDURE — 84100 ASSAY OF PHOSPHORUS: CPT | Performed by: INTERNAL MEDICINE

## 2020-11-03 PROCEDURE — 80048 BASIC METABOLIC PNL TOTAL CA: CPT | Performed by: INTERNAL MEDICINE

## 2020-11-03 PROCEDURE — 83735 ASSAY OF MAGNESIUM: CPT | Performed by: INTERNAL MEDICINE

## 2020-11-03 PROCEDURE — 85027 COMPLETE CBC AUTOMATED: CPT | Performed by: INTERNAL MEDICINE

## 2020-11-03 PROCEDURE — 36415 COLL VENOUS BLD VENIPUNCTURE: CPT

## 2020-11-03 PROCEDURE — 87799 DETECT AGENT NOS DNA QUANT: CPT | Performed by: INTERNAL MEDICINE

## 2020-11-03 PROCEDURE — 80197 ASSAY OF TACROLIMUS: CPT | Performed by: INTERNAL MEDICINE

## 2020-11-06 DIAGNOSIS — N39.0 RECURRENT UTI: ICD-10-CM

## 2020-11-06 DIAGNOSIS — Z94.0 KIDNEY REPLACED BY TRANSPLANT: Primary | ICD-10-CM

## 2020-11-06 LAB
ALBUMIN UR-MCNC: NEGATIVE MG/DL
APPEARANCE UR: CLEAR
BILIRUB UR QL STRIP: NEGATIVE
COLOR UR AUTO: ABNORMAL
GLUCOSE UR STRIP-MCNC: NEGATIVE MG/DL
HGB UR QL STRIP: NEGATIVE
KETONES UR STRIP-MCNC: NEGATIVE MG/DL
LEUKOCYTE ESTERASE UR QL STRIP: ABNORMAL
NITRATE UR QL: NEGATIVE
PH UR STRIP: 7 PH (ref 5–7)
RBC #/AREA URNS AUTO: 0 /HPF (ref 0–2)
SOURCE: ABNORMAL
SP GR UR STRIP: 1 (ref 1–1.03)
SQUAMOUS #/AREA URNS AUTO: <1 /HPF (ref 0–1)
UROBILINOGEN UR STRIP-MCNC: NORMAL MG/DL (ref 0–2)
WBC #/AREA URNS AUTO: 8 /HPF (ref 0–5)

## 2020-11-06 PROCEDURE — 81001 URINALYSIS AUTO W/SCOPE: CPT | Performed by: STUDENT IN AN ORGANIZED HEALTH CARE EDUCATION/TRAINING PROGRAM

## 2020-11-06 PROCEDURE — 87086 URINE CULTURE/COLONY COUNT: CPT | Performed by: STUDENT IN AN ORGANIZED HEALTH CARE EDUCATION/TRAINING PROGRAM

## 2020-11-06 PROCEDURE — 87088 URINE BACTERIA CULTURE: CPT | Performed by: STUDENT IN AN ORGANIZED HEALTH CARE EDUCATION/TRAINING PROGRAM

## 2020-11-06 PROCEDURE — 99207 PR NO BILLABLE SERVICE THIS VISIT: CPT

## 2020-11-08 LAB
BACTERIA SPEC CULT: ABNORMAL
Lab: ABNORMAL
SPECIMEN SOURCE: ABNORMAL

## 2020-11-09 ENCOUNTER — HOSPITAL ENCOUNTER (OUTPATIENT)
Dept: NUCLEAR MEDICINE | Facility: CLINIC | Age: 30
Setting detail: NUCLEAR MEDICINE
End: 2020-11-09
Attending: UROLOGY
Payer: MEDICARE

## 2020-11-09 ENCOUNTER — HOSPITAL ENCOUNTER (OUTPATIENT)
Dept: ULTRASOUND IMAGING | Facility: CLINIC | Age: 30
End: 2020-11-09
Attending: UROLOGY
Payer: MEDICARE

## 2020-11-09 DIAGNOSIS — N13.5 STRICTURE OR KINKING OF URETER: ICD-10-CM

## 2020-11-09 PROCEDURE — 343N000001 HC RX 343: Performed by: UROLOGY

## 2020-11-09 PROCEDURE — 78707 K FLOW/FUNCT IMAGE W/O DRUG: CPT

## 2020-11-09 PROCEDURE — 76770 US EXAM ABDO BACK WALL COMP: CPT

## 2020-11-09 PROCEDURE — 76770 US EXAM ABDO BACK WALL COMP: CPT | Mod: 26 | Performed by: RADIOLOGY

## 2020-11-09 PROCEDURE — 78707 K FLOW/FUNCT IMAGE W/O DRUG: CPT | Mod: 26 | Performed by: RADIOLOGY

## 2020-11-09 PROCEDURE — A9562 TC99M MERTIATIDE: HCPCS | Performed by: UROLOGY

## 2020-11-09 RX ADMIN — TECHNESCAN TC 99M MERTIATIDE 9.9 MCI.: 1 INJECTION, POWDER, LYOPHILIZED, FOR SOLUTION INTRAVENOUS at 12:02

## 2020-11-12 ENCOUNTER — PREP FOR PROCEDURE (OUTPATIENT)
Dept: SURGERY | Facility: CLINIC | Age: 30
End: 2020-11-12

## 2020-11-12 DIAGNOSIS — Z30.41 SURVEILLANCE OF PREVIOUSLY PRESCRIBED CONTRACEPTIVE PILL: ICD-10-CM

## 2020-11-12 DIAGNOSIS — N13.5 URETER, STRICTURE: Primary | ICD-10-CM

## 2020-11-12 LAB — RADIOLOGIST FLAGS: NORMAL

## 2020-11-12 RX ORDER — ACETAMINOPHEN 325 MG/1
975 TABLET ORAL ONCE
Status: CANCELLED | OUTPATIENT
Start: 2020-11-12 | End: 2020-11-12

## 2020-11-12 RX ORDER — CIPROFLOXACIN 2 MG/ML
400 INJECTION, SOLUTION INTRAVENOUS
Status: CANCELLED | OUTPATIENT
Start: 2020-11-12

## 2020-11-13 ENCOUNTER — TELEPHONE (OUTPATIENT)
Dept: TRANSPLANT | Facility: CLINIC | Age: 30
End: 2020-11-13

## 2020-11-13 ENCOUNTER — TELEPHONE (OUTPATIENT)
Dept: UROLOGY | Facility: CLINIC | Age: 30
End: 2020-11-13

## 2020-11-13 DIAGNOSIS — N28.89 OBSTRUCTION OF KIDNEY: Primary | ICD-10-CM

## 2020-11-13 DIAGNOSIS — Z11.59 ENCOUNTER FOR SCREENING FOR OTHER VIRAL DISEASES: Primary | ICD-10-CM

## 2020-11-13 PROBLEM — N13.5 URETER, STRICTURE: Status: ACTIVE | Noted: 2020-11-13

## 2020-11-13 RX ORDER — METHYLPREDNISOLONE 32 MG/1
32 TABLET ORAL DAILY
Qty: 2 TABLET | Refills: 0 | Status: SHIPPED | OUTPATIENT
Start: 2020-11-13 | End: 2021-02-09

## 2020-11-13 NOTE — TELEPHONE ENCOUNTER
Patient is scheduled for surgery with Dr. Britt      Spoke or left message with: left voicemail for Mona    Date of Surgery: 11/23/2020    Location: [place]    Informed patient they will need an adult  yes    Pre-op with surgeon (if applicable): n/a    H&P: Scheduled with Pac on 11/17/2020 (video visit)    Additional imaging/appointments: n/a    Surgery packet: Mailed via [employer] on 11/13/2020     Additional comments: COVID test scheduled at [facility] COVID LABon 11/19/2020 @ 4p

## 2020-11-13 NOTE — TELEPHONE ENCOUNTER
Mona called with concern as she had not received the results of her renogram or US, but was contacted by a  to set up another stent placement.    RNCC did discuss results of renogram (worsening stenosis) and US (mild / moderate hydro) with Mona who voiced understanding.    She posed questions best suited for urology, so RNCC notified Mona that I will ask Dr. Britt's department to reach out to her.        Of note, Mona reports that she is scheduled to go to [facility] in January. RNCC advised that the stent placement should not inhibit travel.    SOT recommendations for travel during COVID pandemic mimic those of the CDC. Travel only when necessary.     Okay to make educated decisions based upon your knowledge of your immunosuppressed status and that illness may be more likely.     Mona voiced understanding.

## 2020-11-14 NOTE — TELEPHONE ENCOUNTER
FUTURE VISIT INFORMATION      SURGERY INFORMATION:    Date: 20    Location: [facility] or    Surgeon:  Wally Britt MD    Anesthesia Type:  General    Procedure: CYSTOSCOPY, WITH RETROGRADE PYELOGRAM AND URETERAL STENT    Consult: ov 10/9    RECORDS REQUESTED FROM:        Primary Care Provider: Macarena Bowen MD- [facility]    Pertinent Medical History: DVT    Most recent EKG+ Tracin20    Most recent ECHO: 19

## 2020-11-16 ENCOUNTER — TELEPHONE (OUTPATIENT)
Dept: TRANSPLANT | Facility: CLINIC | Age: 30
End: 2020-11-16

## 2020-11-16 ENCOUNTER — PRE VISIT (OUTPATIENT)
Dept: UROLOGY | Facility: CLINIC | Age: 30
End: 2020-11-16

## 2020-11-16 NOTE — PATIENT INSTRUCTIONS
Preparing for Your Surgery      Name:  Mona Wagner   MRN:  [id number]   :  1990   Today's Date:  2020       Arriving for surgery:  Surgery date:  20  Arrival time:  1:30 pm    Restrictions due to COVID 19:  No visitors are allowed at this time.    Streamup parking is available for anyone with mobility limitations or disabilities.  ([place]  24 hours/ 7 days a week; [facility]  7 am- 3:30 pm, Mon- Fri)    Please come to:    [facility] [place] [address]  [address] [place]  [address]  -    Please proceed to the Surgery Lounge on the 3rd floor. [phone]?     - ?If you are in need of directions, wheelchair or escort please stop at the Information Desk in the lobby.  Inform the information person that you are here for surgery; a wheelchair and escort will be provided to the Surgery Lounge .?     What can I eat or drink?  -  You may eat and drink normally for up to 8 hours before your surgery.   -  You may have clear liquids until 2 hours before surgery.    Examples of clear liquids:  Water  Clear broth  Juices (apple, white grape, white cranberry  and cider) without pulp  Noncarbonated, powder based beverages  (lemonade and Ben-Aid)  Sodas (Sprite, 7-Up, ginger ale and seltzer)  Coffee or tea (without milk or cream)  Gatorade    -  No Alcohol for at least 24 hours before surgery     Which medicines can I take?    Hold Aspirin for 7 days before surgery.   Hold Multivitamins for 7 days before surgery.  Hold Supplements for 7 days before surgery.  Hold Ibuprofen (Advil, Motrin) for 1 day before surgery--unless otherwise directed by surgeon.  Hold Naproxen (Aleve) for 4 days before surgery.  -  PLEASE TAKE these medications the day of surgery  Tylenol if needed; take all other scheduled morning medications.    How do I prepare myself?  - Please take 2 showers before surgery using Scrubcare or Hibiclens soap.    Use this soap only from the neck to your  toes.     Leave the soap on your skin for one minute--then rinse thoroughly.      You may use your own shampoo and conditioner; no other hair products.   - Please remove all jewelry and body piercings.  - No lotions, deodorants or fragrance.  - No makeup or fingernail polish.   - Bring your ID and insurance card.    - All patients are required to have a Covid-19 test within 4 days of surgery/procedure.      -Patients will be contacted by the [facility] scheduling team within 1 week of surgery to make an appointment.      - Patients may call the Scheduling team at [phone] if they have not been scheduled within 4 days of  surgery.      ALL PATIENTS GOING HOME THE SAME DAY OF SURGERY ARE REQUIRED TO HAVE A RESPONSIBLE ADULT TO DRIVE AND BE IN ATTENDANCE WITH THEM FOR 24 HOURS FOLLOWING SURGERY     Questions or Concerns:    - For any questions regarding the day of surgery or your hospital stay, please contact the Pre Admission Nursing Office at [phone].       - If you have health changes between today and your surgery please call your surgeon.       For questions after surgery please call your surgeons office.

## 2020-11-17 ENCOUNTER — PRE VISIT (OUTPATIENT)
Dept: SURGERY | Facility: CLINIC | Age: 30
End: 2020-11-17

## 2020-11-17 ENCOUNTER — HOSPITAL ENCOUNTER (OUTPATIENT)
Dept: NUCLEAR MEDICINE | Facility: CLINIC | Age: 30
Setting detail: NUCLEAR MEDICINE
Discharge: HOME | End: 2020-11-17
Attending: UROLOGY | Admitting: UROLOGY
Payer: MEDICARE

## 2020-11-17 ENCOUNTER — VIRTUAL VISIT (OUTPATIENT)
Dept: SURGERY | Facility: CLINIC | Age: 30
End: 2020-11-17
Payer: MEDICARE

## 2020-11-17 ENCOUNTER — ANESTHESIA EVENT (OUTPATIENT)
Dept: SURGERY | Facility: CLINIC | Age: 30
End: 2020-11-17

## 2020-11-17 DIAGNOSIS — Z01.818 PREOP EXAMINATION: Primary | ICD-10-CM

## 2020-11-17 DIAGNOSIS — N28.89 OBSTRUCTION OF KIDNEY: ICD-10-CM

## 2020-11-17 DIAGNOSIS — Z01.818 PREOP EXAMINATION: ICD-10-CM

## 2020-11-17 LAB
ALBUMIN UR-MCNC: NEGATIVE MG/DL
ANION GAP SERPL CALCULATED.3IONS-SCNC: 4 MMOL/L (ref 3–14)
APPEARANCE UR: CLEAR
BILIRUB UR QL STRIP: NEGATIVE
BUN SERPL-MCNC: 20 MG/DL (ref 7–30)
CALCIUM SERPL-MCNC: 9.4 MG/DL (ref 8.5–10.1)
CHLORIDE SERPL-SCNC: 107 MMOL/L (ref 94–109)
CO2 SERPL-SCNC: 26 MMOL/L (ref 20–32)
COLOR UR AUTO: NORMAL
CREAT SERPL-MCNC: 0.9 MG/DL (ref 0.52–1.04)
GFR SERPL CREATININE-BSD FRML MDRD: 86 ML/MIN/{1.73_M2}
GLUCOSE SERPL-MCNC: 101 MG/DL (ref 70–99)
GLUCOSE UR STRIP-MCNC: NEGATIVE MG/DL
HGB UR QL STRIP: NEGATIVE
KETONES UR STRIP-MCNC: NEGATIVE MG/DL
LEUKOCYTE ESTERASE UR QL STRIP: NEGATIVE
NITRATE UR QL: NEGATIVE
PH UR STRIP: 7 PH (ref 5–7)
POTASSIUM SERPL-SCNC: 3.7 MMOL/L (ref 3.4–5.3)
SODIUM SERPL-SCNC: 138 MMOL/L (ref 133–144)
SOURCE: NORMAL
SP GR UR STRIP: 1.01 (ref 1–1.03)
UROBILINOGEN UR STRIP-MCNC: 0 MG/DL (ref 0–2)

## 2020-11-17 PROCEDURE — 81003 URINALYSIS AUTO W/O SCOPE: CPT | Performed by: PATHOLOGY

## 2020-11-17 PROCEDURE — 80048 BASIC METABOLIC PNL TOTAL CA: CPT | Performed by: PATHOLOGY

## 2020-11-17 PROCEDURE — 36415 COLL VENOUS BLD VENIPUNCTURE: CPT | Performed by: PATHOLOGY

## 2020-11-17 PROCEDURE — 343N000001 HC RX 343: Performed by: UROLOGY

## 2020-11-17 PROCEDURE — A9562 TC99M MERTIATIDE: HCPCS | Performed by: UROLOGY

## 2020-11-17 PROCEDURE — 999N000127 HC STATISTIC PERIPHERAL IV START W US GUIDANCE

## 2020-11-17 PROCEDURE — 78707 K FLOW/FUNCT IMAGE W/O DRUG: CPT

## 2020-11-17 PROCEDURE — 87086 URINE CULTURE/COLONY COUNT: CPT | Performed by: PATHOLOGY

## 2020-11-17 PROCEDURE — 99212 OFFICE O/P EST SF 10 MIN: CPT | Mod: 95 | Performed by: PHYSICIAN ASSISTANT

## 2020-11-17 PROCEDURE — 78707 K FLOW/FUNCT IMAGE W/O DRUG: CPT | Mod: 26 | Performed by: RADIOLOGY

## 2020-11-17 RX ORDER — ACETAMINOPHEN AND CODEINE PHOSPHATE 120; 12 MG/5ML; MG/5ML
SOLUTION ORAL
Qty: 84 TABLET | Refills: 0 | Status: SHIPPED | OUTPATIENT
Start: 2020-11-17 | End: 2021-02-10

## 2020-11-17 RX ADMIN — TECHNESCAN TC 99M MERTIATIDE 10 MILLICURIE: 1 INJECTION, POWDER, LYOPHILIZED, FOR SOLUTION INTRAVENOUS at 13:00

## 2020-11-17 ASSESSMENT — PAIN SCALES - GENERAL: PAINLEVEL: MILD PAIN (2)

## 2020-11-17 ASSESSMENT — LIFESTYLE VARIABLES: TOBACCO_USE: 0

## 2020-11-17 ASSESSMENT — ENCOUNTER SYMPTOMS: SEIZURES: 1

## 2020-11-17 NOTE — ANESTHESIA PREPROCEDURE EVALUATION
Anesthesia Pre-Procedure Evaluation    Patient: Mona Wagner   MRN:     [id number] Gender:   female   Age:    29 year old :      1990        Preoperative Diagnosis: * No surgery found *        LABS:  CBC:   Lab Results   Component Value Date    WBC 6.5 2020    WBC 7.3 10/05/2020    HGB 11.8 2020    HGB 11.8 10/05/2020    HCT 37.9 2020    HCT 38.4 10/05/2020     2020     10/05/2020     BMP:   Lab Results   Component Value Date     2020     10/05/2020    POTASSIUM 4.2 2020    POTASSIUM 4.2 10/05/2020    CHLORIDE 110 (H) 2020    CHLORIDE 108 10/05/2020    CO2 20 2020    CO2 23 10/05/2020    BUN 21 2020    BUN 14 10/05/2020    CR 0.99 2020    CR 1.02 10/05/2020    GLC 86 2020    GLC 96 10/05/2020     COAGS:   Lab Results   Component Value Date    PTT 33 2019    INR 0.95 2019    FIBR 311 2017     POC:   Lab Results   Component Value Date     (H) 2019    HCG Negative 2020    HCGS Negative 2019     OTHER:   Lab Results   Component Value Date    LACT 1.7 2020    A1C 5.5 2020    SHAMIR 9.3 2020    PHOS 2.6 2020    MAG 1.5 (L) 2020    ALBUMIN 4.0 2020    PROTTOTAL 7.9 2020    ALT 19 2020    AST 11 2020    ALKPHOS 185 (H) 2020    BILITOTAL 0.7 2020    BILIDIRECT 0.2 2013    TSH 2.13 2020    T4 1.02 2020    T3 97 2019    CRP 26.0 (H) 2020    SED 29 (H) 2020        Preop Vitals    BP Readings from Last 3 Encounters:   10/09/20 99/68   10/04/20 120/81   20 108/58    Pulse Readings from Last 3 Encounters:   10/09/20 116   10/04/20 79   20 78      Resp Readings from Last 3 Encounters:   10/04/20 15   20 16   08/15/20 16    SpO2 Readings from Last 3 Encounters:   10/04/20 97%   20 100%   08/15/20 98%      Temp Readings from Last 1 Encounters:   10/04/20 98.1  F (36.7  C)  (Oral)    Ht Readings from Last 1 Encounters:   10/09/20 1.524 m (5')      Wt Readings from Last 1 Encounters:   08/20/20 65.7 kg (144 lb 12.8 oz)    Estimated body mass index is 28.28 kg/m  as calculated from the following:    Height as of 10/9/20: 1.524 m (5').    Weight as of 8/20/20: 65.7 kg (144 lb 12.8 oz).     LDA:  Peripheral IV 09/22/20 Right;Posterior Lower forearm (Active)   Number of days: 56       Hemodialysis Vascular Access Arteriovenous fistula Left Forearm (Active)   Number of days:         Past Medical History:   Diagnosis Date     Anemia in chronic kidney disease      Dialysis patient (H)      End stage renal disease on dialysis (H)      Endocarditis      History of DVT (deep vein thrombosis) 2014     History of seizure 2014     Hypertension      Hypothyroid      Kidney transplanted 08/03/2019    DCD DDKT. Induction with thymo 6mg/kg.     Pituitary adenoma (H)      Pyelonephritis of transplanted kidney 01/23/2020      Past Surgical History:   Procedure Laterality Date     BENCH KIDNEY N/A 8/3/2019    Procedure: BACKBENCH PREPARATION, ALLOGRAFT, KIDNEY;  Surgeon: Dorian Johnson MD;  [facility]     COMBINED CYSTOSCOPY, RETROGRADES, URETEROSCOPY, LASER HOLMIUM LITHOTRIPSY URETER(S), INSERT STENT N/A 12/6/2019    Procedure: CYSTOURETEROSCOPY, WITH RETROGRADE PYELOGRAM of transplant kidney, STENT INSERTION, Laser on standby;  Surgeon: Wally Brtit MD;  [facility]     CREATE FISTULA ARTERIOVENOUS UPPER EXTREMITY  1/2/2014    Procedure: CREATE FISTULA ARTERIOVENOUS UPPER EXTREMITY;  Left Wrist Arteriovenous Fistula Placement;  Surgeon: Shashi Castro MD;  Location[facility]     CYSTOSCOPY, RETROGRADES, INSERT STENT URETER(S), COMBINED N/A 8/20/2020    Procedure: CYSTOSCOPY, WITH RETROGRADE PYELOGRAM, CYSTOGRAM AND URETERAL STENT INSERTION - TRANSPLANT KIDNEY;  Surgeon: Wally Britt MD;  [facility]     PERCUTANEOUS BIOPSY KIDNEY Right 9/17/2019    Procedure: Right  Kidney Biopsy;  Surgeon: Deny Rios MD;  [facility]     RASTA/DIALYSIS CATHETER  12/10/2013          TRANSPLANT KIDNEY RECIPIENT  DONOR N/A 8/3/2019    Procedure: TRANSPLANT, KIDNEY, RECIPIENT,  DONOR with Ureteral Stent Placement;  Surgeon: Dorian Johnson MD;  Location[facility]      Allergies   Allergen Reactions     Contrast Dye Rash     CT contrast allergy 19 rash over eyes. Need to have pre medication before a CT WITH CONTRAST      Chlorhexidine Rash     Rash at site     Sulfa Drugs Rash     Muscle stiffness of neck     Dapsone      Methemoglobinemia     Furosemide Other (See Comments)     Skin flushing     Lisinopril Swelling     angioedema     Metrogel [Metronidazole]      Hives diffusely on body after vaginal administration.     Azithromycin Dizziness and Rash     Cefuroxime Rash        Anesthesia Evaluation     . Pt has had prior anesthetic. Type: General and MAC    No history of anesthetic complications          ROS/MED HX    ENT/Pulmonary:      (-) tobacco use and asthma   Neurologic:     (+)seizures last seizure:  other neuro SAH     Cardiovascular:     (+) ----. : . . . :. . Previous cardiac testing Echodate:2019results:Interpretation Summary   Global and regional left ventricular function is normal with an EF of 55-60%.  Right ventricular function, chamber size, wall motion, and thickness are normal.   All four valves are normal in structure and function.   Trivial pericardial effusion is present.     This study was compared with the study from 2019 .   There has been no change.date: results:ECG reviewed date:2020 results:Sinus tachycardia   Possible Left atrial enlargement   Right axis deviation   Septal infarct , age undetermined   Abnormal ECG date: results:          METS/Exercise Tolerance:  >4 METS   Hematologic:     (+) History of blood clots pt is not anticoagulated, History of Transfusion no previous transfusion reaction -       Musculoskeletal:  - neg musculoskeletal ROS       GI/Hepatic:  - neg GI/hepatic ROS      (-) GERD   Renal/Genitourinary: Comment: IgA nephropathy    (+) chronic renal disease, Pt does not require dialysis, Pt has history of transplant, date: 8/3/2019,       Endo:     (+) thyroid problem hypothyroidism, .   (-) Type I DM and Type II DM   Psychiatric:     (+) psychiatric history anxiety      Infectious Disease:  - neg infectious disease ROS       Malignancy:      - no malignancy   Other:    (+) no H/O Chronic Pain,                   JZG FV AN PHYSICAL EXAM    JZG FV AN PLAN NO PONV RULE       PAC Discussion and Assessment    ASA Classification: 3  Case is suitable for: [place]  Anesthetic techniques and relevant risks discussed: GA  Invasive monitoring and risk discussed: No  Types:   Possibility and Risk of blood transfusion discussed: No  NPO instructions given:   Additional anesthetic preparation and risks discussed:   Needs early admission to pre-op area:   Other:     PAC Resident/NP Anesthesia Assessment:  Mona Wagner is a 29 year old female scheduled for Cystoscopy with retrograde pyelogram and ureteral stent on 11/23/2020 by Dr. Britt in treatment of ureteral stricture.  PAC referral for risk assessment and optimization for anesthesia with comorbid conditions of h/o kidney txp 8/2019,  infective endocarditis 1/2019, UE DVT 2014, seizure disorder, hypothyroidism, and CRI:    Pre-operative considerations:  1.  Cardiac:  Functional status- METS >4.  Low risk surgery with 0.4% (RCRI #) risk of major adverse cardiac event.  No known cardiac disease. Endocarditis 1/2019.  Denies chest pain, SOB, palpitations.    --Florinef for hypotension, takes M/W/F  --testing as above.  Echo 8/2019 with EF 55-60%   2.  Pulm:  Airway feasible.  FATOUMATA risk: Low.  Never smoker  3.  GI:  Risk of PONV score = 2.  If > 2, anti-emetic intervention recommended.  4.  Renal: h/o IgA nephropathy s/p right renal txp 8/3/2019. On MMF,  tacrolimus.  Ureteral stricture requiring intervention, procedure as above. Creatinine 0.99, 11/3/2020.  Check BMP and UA/UC today.  Pt finished course of Augmentin for recent UTI 11/16.    5.  Heme: h/o UE DVT 2014,  No other h/o VTE.  6.  Endo: hypothyroidism, will take levothyroxine  7.  Neuro: h/o seizure 2014.  Associated with SAH.  Treated with Keppra for 5 years.  No recurrence.      VTE risk: 1.8%    Patient is optimized and is acceptable candidate for the proposed procedure.  No further diagnostic evaluation is needed.     Patient also evaluated by  **. See recommendations below.     **For further details of assessment, testing, and physical exam please see H and P completed on same date.          Jennifer Martinez PA-C, [facility]      Reviewed and Signed by PAC Mid-Level Provider/Resident  Mid-Level Provider/Resident: Jennifer Martinez  Date: 11/17/2020  Time:     Attending Anesthesiologist Anesthesia Assessment:        Anesthesiologist:   Date:   Time:   Pass/Fail:   Disposition:     PAC Pharmacist Assessment:        Pharmacist:   Date:   Time:    Jennifer Martinez PA-C

## 2020-11-17 NOTE — PROGRESS NOTES
"Mona Wagner is a 29 year old female who is being evaluated via a billable video visit.      The patient has been notified of following:     \"This video visit will be conducted via a call between you and your physician/provider. We have found that certain health care needs can be provided without the need for an in-person physical exam.  This service lets us provide the care you need with a video conversation.  If a prescription is necessary we can send it directly to your pharmacy.  If lab work is needed we can place an order for that and you can then stop by our lab to have the test done at a later time.    Video visits are billed at different rates depending on your insurance coverage.  Please reach out to your insurance provider with any questions.    If during the course of the call the physician/provider feels a video visit is not appropriate, you will not be charged for this service.\"    Patient has given verbal consent for Video visit? Yes  How would you like to obtain your AVS? MyChart    Will anyone else be joining your video visit? No      VALDEMAR Madera LPN          "

## 2020-11-17 NOTE — H&P
Pre-Operative H & P     CC:  Preoperative exam to assess for increased cardiopulmonary risk while undergoing surgery and anesthesia.    Date of Encounter: 11/17/2020  Primary Care Physician:  Macarena Bowen  Associated Diagnosis: ureter stricture    HPI  Mona Wagner is a 29 year old female who presents via video for pre-operative H & P in preparation for Cystoscopy with retrograde pyelogram and ureteral stent  with Dr. Britt on 11/24/2020 at [facility]. General anesthesia.    Mona Wagner is a 29-year-old female with history of ESKD 2/2 IgA nephropathy s/p right kidney transplant on 8/3/19 with Dr. Johnson c/b hydronephrosis, now s/p cystourethroscopy with retrograde pyelography, ureteroscopy and ureteral stent placement on 12/6/19. She had another cystoscopy retrograde pyelogram on 8/20/20 that showed mild hydronephrosis and no strictures in the transplant ureter.  Lasix Renogram with the stent showed a somewhat improved T1/2 (9/22/20) but was otherwise similar to pre-stent level.  Repeat Lasix renogram on 11/9/2020 showed worsening obstruction at the level of distal renal pelvis, now high-grade.  The above procedure is planned.     Her PHM is otherwise significant for infective endocarditis 1/2019, UE DVT 2014, seizure, hypothyroidism, and CRI.     History is obtained from the patient and chart review.    Past Medical History  Past Medical History:   Diagnosis Date     Anemia in chronic kidney disease      Dialysis patient (H)      End stage renal disease on dialysis (H)      Endocarditis      History of DVT (deep vein thrombosis) 2014     History of seizure 2014     Hypertension      Hypothyroid      Kidney transplanted 08/03/2019    DCD DDKT. Induction with thymo 6mg/kg.     Pituitary adenoma (H)      Pyelonephritis of transplanted kidney 01/23/2020       Past Surgical History  Past Surgical History:   Procedure Laterality Date     BENCH KIDNEY N/A 8/3/2019     Procedure: BACKBENCH PREPARATION, ALLOGRAFT, KIDNEY;  Surgeon: Dorian Johnson MD;  Location: [facility] OR     COMBINED CYSTOSCOPY, RETROGRADES, URETEROSCOPY, LASER HOLMIUM LITHOTRIPSY URETER(S), INSERT STENT N/A 2019    Procedure: CYSTOURETEROSCOPY, WITH RETROGRADE PYELOGRAM of transplant kidney, STENT INSERTION, Laser on standby;  Surgeon: Wally Britt MD;  Location: [facility] OR     CREATE FISTULA ARTERIOVENOUS UPPER EXTREMITY  2014    Procedure: CREATE FISTULA ARTERIOVENOUS UPPER EXTREMITY;  Left Wrist Arteriovenous Fistula Placement;  Surgeon: Shashi Castro MD;  Location: [facility] OR     CYSTOSCOPY, RETROGRADES, INSERT STENT URETER(S), COMBINED N/A 2020    Procedure: CYSTOSCOPY, WITH RETROGRADE PYELOGRAM, CYSTOGRAM AND URETERAL STENT INSERTION - TRANSPLANT KIDNEY;  Surgeon: Wally Britt MD;  Location: [facility]     PERCUTANEOUS BIOPSY KIDNEY Right 2019    Procedure: Right Kidney Biopsy;  Surgeon: Deny Rios MD;  Location: [facility] OR     RASTA/DIALYSIS CATHETER  12/10/2013          TRANSPLANT KIDNEY RECIPIENT  DONOR N/A 8/3/2019    Procedure: TRANSPLANT, KIDNEY, RECIPIENT,  DONOR with Ureteral Stent Placement;  Surgeon: Dorian Johnson MD;  Location: [facility] OR       Hx of Blood transfusions/reactions:  Yes, no reaction     Hx of abnormal bleeding or anti-platelet use: ASA 81mg    Menstrual history: No LMP recorded. (Menstrual status: Irregular Periods).:     Steroid use in the last year: denies    Personal or FH with difficulty with Anesthesia:  denies    Prior to Admission Medications  Current Outpatient Medications   Medication Sig Dispense Refill     acetaminophen (TYLENOL) 325 MG tablet Take 2 tablets (650 mg) by mouth every 4 hours as needed for mild pain 100 tablet 0     aspirin (ASA) 81 MG chewable tablet CHEW AND SWALLOW 1 TABLET DAILY (Patient taking differently: Take 81 mg by mouth every morning ) 36 tablet 10     atorvastatin (LIPITOR) 10 MG tablet Take 1  tablet (10 mg) by mouth daily (Patient taking differently: Take 10 mg by mouth every morning ) 90 tablet 3     cinacalcet (SENSIPAR) 30 MG tablet Take 1 tablet (30 mg) by mouth daily (Patient taking differently: Take 30 mg by mouth every evening ) 30 tablet 11     diphenhydrAMINE (BENADRYL) 25 MG capsule Take 1-2 tablets by mouth every 6 hours PRN itching/allergies (Patient taking differently: Take 25 mg by mouth as needed Take 1-2 tablets by mouth every 6 hours PRN itching/allergies) 100 capsule 1     EPINEPHrine (ANY BX GENERIC EQUIV) 0.3 MG/0.3ML injection 2-pack Inject 0.3 mLs (0.3 mg) into the muscle as needed for anaphylaxis 2 each 1     fludrocortisone (FLORINEF) 0.1 MG tablet Take 1 tablet (0.1 mg) by mouth Every Mon, Wed, Fri Morning 180 tablet 3     hydrOXYzine (ATARAX) 10 MG tablet Take 1 tablet (10 mg) by mouth 3 times daily as needed for anxiety 20 tablet 0     lactobacillus rhamnosus, GG, (CULTURELL) capsule Take 1 capsule by mouth 2 times daily        levothyroxine (SYNTHROID/LEVOTHROID) 25 MCG tablet Take 1 tablet (25 mcg) Tuesday, Thursday, Saturday and Sunday and 1.5 tablets (37.5 mcg) on Monday, Wednesday and Friday every week. (Patient taking differently: Take 25 mcg by mouth every morning Take 1 tablet (25 mcg) Tuesday, Thursday, Saturday and Sunday and 1.5 tablets (37.5 mcg) on Monday, Wednesday and Friday every week.) 105 tablet 4     magnesium oxide (MAG-OX) 400 MG tablet Take 400 mg by mouth every morning  180 tablet 0     methylPREDNISolone (MEDROL) 32 MG tablet Take 1 tablet (32 mg) by mouth daily 12 hours and 2 hours prior to the procedure with IV contrast 2 tablet 0     MYFORTIC (BRAND) 180 MG EC tablet Take 3 tablets (540 mg) by mouth 2 times daily 180 tablet 11     norethindrone (MICRONOR) 0.35 MG tablet Do not restart until your follow up appointment with your surgeon. Discuss restart date at that appointment. (Patient taking differently: Take 0.35 mg by mouth every evening Do not  restart until your follow up appointment with your surgeon. Discuss restart date at that appointment.) 84 tablet 3     senna-docusate (SENOKOT-S/PERICOLACE) 8.6-50 MG tablet Take 1-2 tablets by mouth 2 times daily (Patient taking differently: Take 1-2 tablets by mouth 2 times daily as needed ) 30 tablet 0     simethicone (MYLICON) 125 MG chewable tablet Take 1 tablet (125 mg) by mouth 4 times daily as needed for intestinal gas 120 tablet 4     sodium bicarbonate 650 MG tablet TAKE 3 TABLETS(1950 MG) BY MOUTH TWICE DAILY (Patient taking differently: Take 325 mg by mouth 2 times daily ) 180 tablet 11     tacrolimus (GENERIC EQUIVALENT) 1 MG capsule Take 3 capsules (3 mg) by mouth 2 times daily 180 capsule 11     tacrolimus (GENERIC EQUIVALENT) 0.5 MG capsule HOLD (Patient not taking: Reported on 11/17/2020) 60 capsule 11       Allergies  Allergies   Allergen Reactions     Contrast Dye Rash     CT contrast allergy 12/14/19 rash over eyes. Need to have pre medication before a CT WITH CONTRAST      Chlorhexidine Rash     Rash at site     Sulfa Drugs Rash     Muscle stiffness of neck     Dapsone      Methemoglobinemia     Furosemide Other (See Comments)     Skin flushing     Lisinopril Swelling     angioedema     Metrogel [Metronidazole]      Hives diffusely on body after vaginal administration.     Azithromycin Dizziness and Rash     Cefuroxime Rash       Social History  Social History     Socioeconomic History     Marital status:      Spouse name: Not on file     Number of children: 0     Years of education: Not on file     Highest education level: Not on file   Occupational History     Occupation: Pharmacy Technician   Social Needs     Financial resource strain: Not on file     Food insecurity     Worry: Not on file     Inability: Not on file     Transportation needs     Medical: Not on file     Non-medical: Not on file   Tobacco Use     Smoking status: Never Smoker     Smokeless tobacco: Never Used   Substance  and Sexual Activity     Alcohol use: No     Alcohol/week: 0.0 standard drinks     Drug use: No     Sexual activity: Yes     Partners: Male   Lifestyle     Physical activity     Days per week: Not on file     Minutes per session: Not on file     Stress: Not on file   Relationships     Social connections     Talks on phone: Not on file     Gets together: Not on file     Attends [employer]ism service: Not on file     Active member of club or organization: Not on file     Attends meetings of clubs or organizations: Not on file     Relationship status: Not on file     Intimate partner violence     Fear of current or ex partner: Not on file     Emotionally abused: Not on file     Physically abused: Not on file     Forced sexual activity: Not on file   Other Topics Concern     Parent/sibling w/ CABG, MI or angioplasty before 65F 55M? Not Asked   Social History Narrative    Date of Service:5/15/2013     Name: Mona VALDOVINOS (Month, Day, Year of birth): 1990     MRN: [id number]     New Patient: Yes    Preferred Language: English     Needed: No    County of Residence: [place]    Marital Status:     Household size: 8    Number of Dependents:0     Pregnant: 0    Average Monthly Income: $ 600    Citizenship Status: Citizen    Gave Pt [employer] and [employer] applications       Family History  Family History   Problem Relation Age of Onset     Hypertension Sister      Diabetes Sister      Cerebrovascular Disease Sister      Kidney Disease Mother      Kidney Disease Sister      Cerebrovascular Disease Father      Coronary Artery Disease No family hx of      Breast Cancer No family hx of      Cancer - colorectal No family hx of      Ovarian Cancer No family hx of      Prostate Cancer No family hx of      Other Cancer No family hx of      Asthma No family hx of      Anesthesia Reaction No family hx of      Deep Vein Thrombosis (DVT) No family hx of              Anesthesia Evaluation     . Pt has had prior  anesthetic. Type: General and MAC    No history of anesthetic complications          ROS/MED HX    ENT/Pulmonary:      (-) tobacco use and asthma   Neurologic:     (+)seizures last seizure: 2014 other neuro SAH 2014    Cardiovascular:     (+) ----. : . . . :. . Previous cardiac testing Echodate:8/16/2019results:Interpretation Summary   Global and regional left ventricular function is normal with an EF of 55-60%.  Right ventricular function, chamber size, wall motion, and thickness are normal.   All four valves are normal in structure and function.   Trivial pericardial effusion is present.     This study was compared with the study from 7/9/2019 .   There has been no change.date: results:ECG reviewed date:1/23/2020 results:Sinus tachycardia   Possible Left atrial enlargement   Right axis deviation   Septal infarct , age undetermined   Abnormal ECG date: results:          METS/Exercise Tolerance:  >4 METS   Hematologic:     (+) History of blood clots pt is not anticoagulated, History of Transfusion no previous transfusion reaction -      Musculoskeletal:  - neg musculoskeletal ROS       GI/Hepatic:  - neg GI/hepatic ROS      (-) GERD   Renal/Genitourinary: Comment: IgA nephropathy    (+) chronic renal disease, Pt does not require dialysis, Pt has history of transplant, date: 8/3/2019,       Endo:     (+) thyroid problem hypothyroidism, .   (-) Type I DM and Type II DM   Psychiatric:     (+) psychiatric history anxiety      Infectious Disease:  - neg infectious disease ROS       Malignancy:      - no malignancy   Other:    (+) no H/O Chronic Pain,         The complete review of systems is negative other than noted in the HPI or here.     Physical Exam    Please refer to the physical examination documented by the anesthesiologist in the anesthesia record on the day of surgery    Constitutional: Awake, alert, cooperative, no apparent distress, and appears stated age.  Respiratory: non-labored breathing  Neurologic:  Awake, alert, oriented to name, place and time. Neuropsychiatric: Calm, cooperative. Normal affect.     Labs: (personally reviewed)  Component      Latest Ref Rng & Units 11/3/2020   Sodium      133 - 144 mmol/L 139   Potassium      3.4 - 5.3 mmol/L 4.2   Chloride      94 - 109 mmol/L 110 (H)   Carbon Dioxide      20 - 32 mmol/L 20   Anion Gap      3 - 14 mmol/L 9   Glucose      70 - 99 mg/dL 86   Urea Nitrogen      7 - 30 mg/dL 21   Creatinine      0.52 - 1.04 mg/dL 0.99   GFR Estimate      >60 mL/min/1.73:m2 78   GFR Estimate If Black      >60 mL/min/1.73:m2 >90   Calcium      8.5 - 10.1 mg/dL 9.3   WBC      4.0 - 11.0 10e9/L 6.5   RBC Count      3.8 - 5.2 10e12/L 4.16   Hemoglobin      11.7 - 15.7 g/dL 11.8   Hematocrit      35.0 - 47.0 % 37.9   MCV      78 - 100 fl 91   MCH      26.5 - 33.0 pg 28.4   MCHC      31.5 - 36.5 g/dL 31.1 (L)   RDW      10.0 - 15.0 % 13.2   Platelet Count      150 - 450 10e9/L 241     Component      Latest Ref Rng & Units 11/17/2020   Sodium      133 - 144 mmol/L 138   Potassium      3.4 - 5.3 mmol/L 3.7   Chloride      94 - 109 mmol/L 107   Carbon Dioxide      20 - 32 mmol/L 26   Anion Gap      3 - 14 mmol/L 4   Glucose      70 - 99 mg/dL 101 (H)   Urea Nitrogen      7 - 30 mg/dL 20   Creatinine      0.52 - 1.04 mg/dL 0.90   GFR Estimate      >60 mL/min/1.73:m2 86   GFR Estimate If Black      >60 mL/min/1.73:m2 >90   Calcium      8.5 - 10.1 mg/dL 9.4     EKG: Personally reviewed: 1/23/2020  Sinus tachycardia   Possible Left atrial enlargement   Right axis deviation   Septal infarct , age undetermined   Abnormal ECG       Cardiac echo: 8/16/2019:  Interpretation Summary   Global and regional left ventricular function is normal with an EF of 55-60%.   Right ventricular function, chamber size, wall motion, and thickness are   normal.   All four valves are normal in structure and function.   Trivial pericardial effusion is present.   This study was compared with the study from 7/9/2019 .    There has been no change.       Outside records reviewed from: care everywhere      ASSESSMENT and PLAN  Mona Wagner is a 29 year old female scheduled for Cystoscopy with retrograde pyelogram and ureteral stent on 11/23/2020 by Dr. Britt in treatment of ureteral stricture.  PAC referral for risk assessment and optimization for anesthesia with comorbid conditions of h/o kidney txp 8/2019,  infective endocarditis 1/2019, UE DVT 2014, seizure disorder, hypothyroidism, and CRI:    Pre-operative considerations:  1.  Cardiac:  Functional status- METS >4.  Low risk surgery with 0.4% (RCRI #) risk of major adverse cardiac event.  No known cardiac disease. Endocarditis 1/2019.  Denies chest pain, SOB, palpitations.    --Florinef for hypotension, takes M/W/F  --testing as above.  Echo 8/2019 with EF 55-60%   2.  Pulm:  Airway feasible.  FATOUMATA risk: Low.  Never smoker  3.  GI:  Risk of PONV score = 2.  If > 2, anti-emetic intervention recommended.  4.  Renal: h/o IgA nephropathy s/p right renal txp 8/3/2019. On MMF, tacrolimus.  Ureteral stricture requiring intervention, procedure as above. Creatinine 0.99, 11/3/2020.  Check BMP and UA/UC today.  Pt finished course of Augmentin for recent UTI 11/16.    5.  Heme: h/o UE DVT 2014,  No other h/o VTE.  6.  Endo: hypothyroidism, will take levothyroxine  7.  Neuro: h/o seizure 2014.  Associated with SAH.  Treated with Keppra for 5 years.  No recurrence.      VTE risk: 1.8%    Patient is optimized and is acceptable candidate for the proposed procedure.  No further diagnostic evaluation is needed.         Jennifer Martinez PA-C  Preoperative Assessment Center  [facility]  Phone: [phone]  Fax: [phone]

## 2020-11-17 NOTE — TELEPHONE ENCOUNTER
Patient called with questions regarding when to take Methylprednisone that was called into pharmacy today.  She has two procedures coming up - a renogram and cysto/pyelogram.  Renogram is tomorrow, patient has had this done before & did not require steroids; has not had the pyelogram before.  Reviewed chart, no indication to take methylprednisone before renogram tomorrow.  Will hold off on taking & f/u tomorrow at appointment.

## 2020-11-18 ENCOUNTER — PATIENT OUTREACH (OUTPATIENT)
Dept: UROLOGY | Facility: CLINIC | Age: 30
End: 2020-11-18

## 2020-11-18 LAB
BACTERIA SPEC CULT: NO GROWTH
Lab: NORMAL
SPECIMEN SOURCE: NORMAL

## 2020-11-20 ENCOUNTER — VIRTUAL VISIT (OUTPATIENT)
Dept: UROLOGY | Facility: CLINIC | Age: 30
End: 2020-11-20
Payer: MEDICARE

## 2020-11-20 DIAGNOSIS — N13.5 STRICTURE OR KINKING OF URETER: Primary | ICD-10-CM

## 2020-11-20 DIAGNOSIS — Z11.59 ENCOUNTER FOR SCREENING FOR OTHER VIRAL DISEASES: ICD-10-CM

## 2020-11-20 PROCEDURE — 99213 OFFICE O/P EST LOW 20 MIN: CPT | Mod: 95 | Performed by: UROLOGY

## 2020-11-20 PROCEDURE — U0003 INFECTIOUS AGENT DETECTION BY NUCLEIC ACID (DNA OR RNA); SEVERE ACUTE RESPIRATORY SYNDROME CORONAVIRUS 2 (SARS-COV-2) (CORONAVIRUS DISEASE [COVID-19]), AMPLIFIED PROBE TECHNIQUE, MAKING USE OF HIGH THROUGHPUT TECHNOLOGIES AS DESCRIBED BY CMS-2020-01-R: HCPCS | Performed by: PATHOLOGY

## 2020-11-20 ASSESSMENT — PAIN SCALES - GENERAL: PAINLEVEL: NO PAIN (0)

## 2020-11-20 NOTE — PROGRESS NOTES
Urology Clinic    Wally Britt MD  Date of Service: 2020     Name: Mona Wagner  MRN: [id number]  Age: 29 year old  : 1990  Referring provider: Wally Britt     Assessment:  Possible   Her Lasix Renogram 20  and then 20 both show high grade obstruction.  Her creatinine though is 0.90 and as low as it has been.    Plan:  -Plan for cystoscopy and stent on Monday.  Plan to check for reflux at that time as well per ID recs.    ______________________________________________________________________    HPI  Mona Wagner is a 29-year-old female with history of ESKD 2/2 IgA nephropathy s/p right kidney transplant on 8/3/19 with Dr. Johnson c/b hydronephrosis, now s/p cystourethroscopy with retrograde pyelography, ureteroscopy and ureteral stent placement on 19. She had another cystoscopy retrograde pyelogram on 20 that showed mild hydronephrosis and no strictures in the transplant ureter.  Lasix Renogram with the stent showed a somewhat improved T1/2 (20) but was otherwise similar to pre-stent level.  Stent was removed 10/9/20.    20 she feels that she has some bladder pressure and over her kidney/      RADIOLOGIC IMAGING  I reviewed the recent radiologic imaging and reports described below.  TRINY Britt  NM RENOGRAM 2020 2:38 PM      Comparison: Ultrasound 2020, renogram 2020     History: eval kidney; Obstruction of kidney     Technique: Dose: 10 mCi Tc-99m MAG3. No furosemide was administered  for this study due to patient allergy. Flow and excretion phase images  were acquired over 40 minutes.     Findings:   Evaluation of initial blood flow images demonstrates normal perfusion  of the right lower quadrant transplant kidney.      Dynamic images demonstrate good cortical uptake with normal to  slightly delayed transition, and continuous prolonged accumulation of  radiotracer within the renal calyces and pelvis. There is delayed  spontaneous  "passage of radiotracer into the ureter, however there is  some emptying seen by 40 minutes, with radiotracer seen within the  bladder.                                                                      Impression: Mild improvement compared to renogram 11/9/2020 with  improved passage of radiotracer past the pelviureteral junction by 40  minutes.  However overall there is still significant retention within  the pelvicalyceal system by end of exam, consistent with mildly  improved but ongoing high-grade partial obstruction at the  pelviureteral junction.    The patient has been notified of following:   \"This video visit will be conducted via a call between you and your physician/provider. We have found that certain health care needs can be provided without the need for an in-person physical exam.  This service lets us provide the care you need with a video conversation.  If a prescription is necessary we can send it directly to your pharmacy.  If lab work is needed we can place an order for that and you can then stop by our lab to have the test done at a later time.  Video visits are billed at different rates depending on your insurance coverage.  Please reach out to your insurance provider with any questions.  If during the course of the call the physician/provider feels a video visit is not appropriate, you will not be charged for this service.\"  Patient has given verbal consent for Video visit? Yes  Video-Visit Details  Type of service:  Video Visit  Video Start Time: 250p  Video End Time: 310p  Originating Location (pt. Location): Home.  Distant Location (provider location):  [facility].  Platform used for Video Visit: Naya Britt MD          "

## 2020-11-20 NOTE — LETTER
2020       RE: Mona Wagner  [address]  [place] [address]     Dear Colleague,    Thank you for referring your patient, Mona Wagner, to the [facility] UROLOGY CLINIC [place] at [facility]. Please see a copy of my visit note below.      Urology Clinic    Wally Britt MD  Date of Service: 2020     Name: Mona Wagner  MRN: [id number]  Age: 29 year old  : 1990  Referring provider: Wally Britt     Assessment:  Possible   Her Lasix Renogram 20  and then 20 both show high grade obstruction.  Her creatinine though is 0.90 and as low as it has been.    Plan:  -Plan for cystoscopy and stent on Monday.  Plan to check for reflux at that time as well per ID recs.    ______________________________________________________________________    HPI  Mona Wagner is a 29-year-old female with history of ESKD 2/2 IgA nephropathy s/p right kidney transplant on 8/3/19 with Dr. Johnson c/b hydronephrosis, now s/p cystourethroscopy with retrograde pyelography, ureteroscopy and ureteral stent placement on 19. She had another cystoscopy retrograde pyelogram on 20 that showed mild hydronephrosis and no strictures in the transplant ureter.  Lasix Renogram with the stent showed a somewhat improved T1/2 (20) but was otherwise similar to pre-stent level.  Stent was removed 10/9/20.    20 she feels that she has some bladder pressure and over her kidney/      RADIOLOGIC IMAGING  I reviewed the recent radiologic imaging and reports described below.  TRINY Britt  NM RENOGRAM 2020 2:38 PM      Comparison: Ultrasound 2020, renogram 2020     History: eval kidney; Obstruction of kidney     Technique: Dose: 10 mCi Tc-99m MAG3. No furosemide was administered  for this study due to patient allergy. Flow and excretion phase images  were acquired over 40 minutes.     Findings:   Evaluation of initial blood flow images  "demonstrates normal perfusion  of the right lower quadrant transplant kidney.      Dynamic images demonstrate good cortical uptake with normal to  slightly delayed transition, and continuous prolonged accumulation of  radiotracer within the renal calyces and pelvis. There is delayed  spontaneous passage of radiotracer into the ureter, however there is  some emptying seen by 40 minutes, with radiotracer seen within the  bladder.                                                                      Impression: Mild improvement compared to renogram 11/9/2020 with  improved passage of radiotracer past the pelviureteral junction by 40  minutes.  However overall there is still significant retention within  the pelvicalyceal system by end of exam, consistent with mildly  improved but ongoing high-grade partial obstruction at the  pelviureteral junction.    The patient has been notified of following:   \"This video visit will be conducted via a call between you and your physician/provider. We have found that certain health care needs can be provided without the need for an in-person physical exam.  This service lets us provide the care you need with a video conversation.  If a prescription is necessary we can send it directly to your pharmacy.  If lab work is needed we can place an order for that and you can then stop by our lab to have the test done at a later time.  Video visits are billed at different rates depending on your insurance coverage.  Please reach out to your insurance provider with any questions.  If during the course of the call the physician/provider feels a video visit is not appropriate, you will not be charged for this service.\"  Patient has given verbal consent for Video visit? Yes  Video-Visit Details  Type of service:  Video Visit  Video Start Time: 250p  Video End Time: 310p  Originating Location (pt. Location): Home.  Distant Location (provider location):  [facility] UROLOGY AND [facility] [[email or web address]] FOR PROSTATE AND " UROLOGIC CANCERS.  Platform used for Video Visit: Naya Britt MD

## 2020-11-20 NOTE — PROGRESS NOTES
"Video Visit Technology for this patient: Swoopo Video Visit- Patient was left in waiting room    Mona Wagner is a 29 year old female who is being evaluated via a billable video visit.      The patient has been notified of following:     \"This video visit will be conducted via a call between you and your physician/provider. We have found that certain health care needs can be provided without the need for an in-person physical exam.  This service lets us provide the care you need with a video conversation.  If a prescription is necessary we can send it directly to your pharmacy.  If lab work is needed we can place an order for that and you can then stop by our lab to have the test done at a later time.    Video visits are billed at different rates depending on your insurance coverage.  Please reach out to your insurance provider with any questions.    If during the course of the call the physician/provider feels a video visit is not appropriate, you will not be charged for this service.\"    Patient has given verbal consent for Video visit? Yes  How would you like to obtain your AVS? MyChart  If you are dropped from the video visit, the video invite should be resent to:   Will anyone else be joining your video visit? No  {If patient encounters technical issues they should call [phone] :[id number]}      Video-Visit Details    Type of service:  Video Visit    Video Start Time: {video visit start/end time:152948}  Video End Time: {video visit start/end time:152948}    Originating Location (pt. Location): {video visit patient location:739806::\"Home\"}    Distant Location (provider location):  [facility] UROLOGY [facility]     Platform used for Video Visit: {Virtual Visit Platforms:811838::\"Swoopo\"}    {signature options:594870}        "

## 2020-11-21 LAB
SARS-COV-2 RNA SPEC QL NAA+PROBE: NOT DETECTED
SPECIMEN SOURCE: NORMAL

## 2020-11-22 ENCOUNTER — ANESTHESIA EVENT (OUTPATIENT)
Dept: SURGERY | Facility: CLINIC | Age: 30
End: 2020-11-22
Payer: MEDICARE

## 2020-11-22 NOTE — RESULT ENCOUNTER NOTE
Ms. Kristy,   Enclosed are a copy of your recent laboratory tests.  I have reviewed these results.  The results are as I would expect and I don't think any further laboratory tests are necessary at this time.  There may be other results pending.  If there are, I will send there on to you when they are complete.  You should plan to follow-up as we discussed at your recent visit.  Please let me know if you have any questions.  Thank you for choosing the [facility] for your care.  MARCUS Britt MD.

## 2020-11-23 ENCOUNTER — ANESTHESIA (OUTPATIENT)
Dept: SURGERY | Facility: CLINIC | Age: 30
End: 2020-11-23
Payer: MEDICARE

## 2020-11-23 ENCOUNTER — HOSPITAL ENCOUNTER (OUTPATIENT)
Facility: CLINIC | Age: 30
Discharge: HOME | End: 2020-11-23
Attending: UROLOGY | Admitting: UROLOGY
Payer: MEDICARE

## 2020-11-23 ENCOUNTER — APPOINTMENT (OUTPATIENT)
Dept: GENERAL RADIOLOGY | Facility: CLINIC | Age: 30
End: 2020-11-23
Attending: UROLOGY
Payer: MEDICARE

## 2020-11-23 VITALS
WEIGHT: 148.59 LBS | OXYGEN SATURATION: 98 % | DIASTOLIC BLOOD PRESSURE: 84 MMHG | SYSTOLIC BLOOD PRESSURE: 125 MMHG | RESPIRATION RATE: 16 BRPM | HEIGHT: 60 IN | TEMPERATURE: 98 F | HEART RATE: 91 BPM | BODY MASS INDEX: 29.17 KG/M2

## 2020-11-23 DIAGNOSIS — N13.5 URETER, STRICTURE: ICD-10-CM

## 2020-11-23 DIAGNOSIS — N13.5 URETER, STRICTURE: Primary | ICD-10-CM

## 2020-11-23 DIAGNOSIS — N13.5 STRICTURE OR KINKING OF URETER: Primary | ICD-10-CM

## 2020-11-23 LAB
CREAT SERPL-MCNC: 0.9 MG/DL (ref 0.52–1.04)
GFR SERPL CREATININE-BSD FRML MDRD: 88 ML/MIN/{1.73_M2}
GLUCOSE BLDC GLUCOMTR-MCNC: 137 MG/DL (ref 70–99)
HCG UR QL: NEGATIVE

## 2020-11-23 PROCEDURE — 82565 ASSAY OF CREATININE: CPT | Performed by: UROLOGY

## 2020-11-23 PROCEDURE — 360N000019 HC SURGERY LEVEL 2 W FLUORO 1ST 30 MIN - UMMC: Performed by: UROLOGY

## 2020-11-23 PROCEDURE — 81025 URINE PREGNANCY TEST: CPT | Performed by: ANESTHESIOLOGY

## 2020-11-23 PROCEDURE — 255N000002 HC RX 255 OP 636: Performed by: UROLOGY

## 2020-11-23 PROCEDURE — C1769 GUIDE WIRE: HCPCS | Performed by: UROLOGY

## 2020-11-23 PROCEDURE — 250N000011 HC RX IP 250 OP 636: Performed by: ANESTHESIOLOGY

## 2020-11-23 PROCEDURE — 250N000003 HC SEVOFLURANE, EA 15 MIN: Performed by: UROLOGY

## 2020-11-23 PROCEDURE — 761N000003 HC RECOVERY PHASE 1 LEVEL 2 FIRST HR: Performed by: UROLOGY

## 2020-11-23 PROCEDURE — 999N001017 HC STATISTIC GLUCOSE BY METER IP

## 2020-11-23 PROCEDURE — 258N000003 HC RX IP 258 OP 636: Performed by: NURSE ANESTHETIST, CERTIFIED REGISTERED

## 2020-11-23 PROCEDURE — C1758 CATHETER, URETERAL: HCPCS | Performed by: UROLOGY

## 2020-11-23 PROCEDURE — 250N000011 HC RX IP 250 OP 636: Performed by: UROLOGY

## 2020-11-23 PROCEDURE — 761N000007 HC RECOVERY PHASE 2 EACH 15 MINS: Performed by: UROLOGY

## 2020-11-23 PROCEDURE — 36415 COLL VENOUS BLD VENIPUNCTURE: CPT | Performed by: UROLOGY

## 2020-11-23 PROCEDURE — 370N000002 HC ANESTHESIA TECHNICAL FEE, EACH ADDTL 15 MIN: Performed by: UROLOGY

## 2020-11-23 PROCEDURE — 250N000009 HC RX 250: Performed by: NURSE ANESTHETIST, CERTIFIED REGISTERED

## 2020-11-23 PROCEDURE — 999N000139 HC STATISTIC PRE-PROCEDURE ASSESSMENT II: Performed by: UROLOGY

## 2020-11-23 PROCEDURE — C2617 STENT, NON-COR, TEM W/O DEL: HCPCS | Performed by: UROLOGY

## 2020-11-23 PROCEDURE — 250N000013 HC RX MED GY IP 250 OP 250 PS 637: Performed by: UROLOGY

## 2020-11-23 PROCEDURE — 360N000017 HC SURGERY LEVEL 2 EA 15 ADDTL MIN - UMMC: Performed by: UROLOGY

## 2020-11-23 PROCEDURE — 370N000001 HC ANESTHESIA TECHNICAL FEE, 1ST 30 MIN: Performed by: UROLOGY

## 2020-11-23 PROCEDURE — C1887 CATHETER, GUIDING: HCPCS | Performed by: UROLOGY

## 2020-11-23 PROCEDURE — 272N000001 HC OR GENERAL SUPPLY STERILE: Performed by: UROLOGY

## 2020-11-23 PROCEDURE — 250N000011 HC RX IP 250 OP 636: Performed by: NURSE ANESTHETIST, CERTIFIED REGISTERED

## 2020-11-23 PROCEDURE — 999N000179 XR SURGERY CARM FLUORO LESS THAN 5 MIN W STILLS: Mod: TC

## 2020-11-23 DEVICE — STENT URETERAL PERCUFLEX PLUS 6FRX22CM M0061752610: Type: IMPLANTABLE DEVICE | Site: URETER | Status: FUNCTIONAL

## 2020-11-23 RX ORDER — FENTANYL CITRATE 50 UG/ML
INJECTION, SOLUTION INTRAMUSCULAR; INTRAVENOUS PRN
Status: DISCONTINUED | OUTPATIENT
Start: 2020-11-23 | End: 2020-11-23

## 2020-11-23 RX ORDER — FENTANYL CITRATE 50 UG/ML
25-50 INJECTION, SOLUTION INTRAMUSCULAR; INTRAVENOUS
Status: DISCONTINUED | OUTPATIENT
Start: 2020-11-23 | End: 2020-11-23 | Stop reason: HOSPADM

## 2020-11-23 RX ORDER — PROPOFOL 10 MG/ML
INJECTION, EMULSION INTRAVENOUS PRN
Status: DISCONTINUED | OUTPATIENT
Start: 2020-11-23 | End: 2020-11-23

## 2020-11-23 RX ORDER — PHYSOSTIGMINE SALICYLATE 1 MG/ML
1.2 INJECTION INTRAVENOUS
Status: DISCONTINUED | OUTPATIENT
Start: 2020-11-23 | End: 2020-11-23 | Stop reason: HOSPADM

## 2020-11-23 RX ORDER — EPHEDRINE SULFATE 50 MG/ML
INJECTION, SOLUTION INTRAMUSCULAR; INTRAVENOUS; SUBCUTANEOUS PRN
Status: DISCONTINUED | OUTPATIENT
Start: 2020-11-23 | End: 2020-11-23

## 2020-11-23 RX ORDER — ONDANSETRON 2 MG/ML
4 INJECTION INTRAMUSCULAR; INTRAVENOUS EVERY 30 MIN PRN
Status: DISCONTINUED | OUTPATIENT
Start: 2020-11-23 | End: 2020-11-23 | Stop reason: HOSPADM

## 2020-11-23 RX ORDER — DEXAMETHASONE SODIUM PHOSPHATE 4 MG/ML
INJECTION, SOLUTION INTRA-ARTICULAR; INTRALESIONAL; INTRAMUSCULAR; INTRAVENOUS; SOFT TISSUE PRN
Status: DISCONTINUED | OUTPATIENT
Start: 2020-11-23 | End: 2020-11-23

## 2020-11-23 RX ORDER — HYDROMORPHONE HYDROCHLORIDE 1 MG/ML
.3-.5 INJECTION, SOLUTION INTRAMUSCULAR; INTRAVENOUS; SUBCUTANEOUS EVERY 10 MIN PRN
Status: DISCONTINUED | OUTPATIENT
Start: 2020-11-23 | End: 2020-11-23 | Stop reason: HOSPADM

## 2020-11-23 RX ORDER — ONDANSETRON 4 MG/1
4 TABLET, ORALLY DISINTEGRATING ORAL EVERY 30 MIN PRN
Status: DISCONTINUED | OUTPATIENT
Start: 2020-11-23 | End: 2020-11-23 | Stop reason: HOSPADM

## 2020-11-23 RX ORDER — CIPROFLOXACIN 2 MG/ML
400 INJECTION, SOLUTION INTRAVENOUS
Status: DISCONTINUED | OUTPATIENT
Start: 2020-11-23 | End: 2020-11-23 | Stop reason: HOSPADM

## 2020-11-23 RX ORDER — OXYBUTYNIN CHLORIDE 5 MG/1
5 TABLET ORAL 3 TIMES DAILY PRN
Qty: 30 TABLET | Refills: 0 | Status: SHIPPED | OUTPATIENT
Start: 2020-11-23 | End: 2021-02-09

## 2020-11-23 RX ORDER — SODIUM CHLORIDE, SODIUM LACTATE, POTASSIUM CHLORIDE, CALCIUM CHLORIDE 600; 310; 30; 20 MG/100ML; MG/100ML; MG/100ML; MG/100ML
INJECTION, SOLUTION INTRAVENOUS CONTINUOUS PRN
Status: DISCONTINUED | OUTPATIENT
Start: 2020-11-23 | End: 2020-11-23

## 2020-11-23 RX ORDER — ONDANSETRON 2 MG/ML
INJECTION INTRAMUSCULAR; INTRAVENOUS PRN
Status: DISCONTINUED | OUTPATIENT
Start: 2020-11-23 | End: 2020-11-23

## 2020-11-23 RX ORDER — ALBUTEROL SULFATE 0.83 MG/ML
2.5 SOLUTION RESPIRATORY (INHALATION) EVERY 4 HOURS PRN
Status: DISCONTINUED | OUTPATIENT
Start: 2020-11-23 | End: 2020-11-23 | Stop reason: HOSPADM

## 2020-11-23 RX ORDER — DIPHENHYDRAMINE HYDROCHLORIDE 50 MG/ML
25 INJECTION INTRAMUSCULAR; INTRAVENOUS ONCE
Status: COMPLETED | OUTPATIENT
Start: 2020-11-23 | End: 2020-11-23

## 2020-11-23 RX ORDER — ACETAMINOPHEN 325 MG/1
975 TABLET ORAL ONCE
Status: COMPLETED | OUTPATIENT
Start: 2020-11-23 | End: 2020-11-23

## 2020-11-23 RX ORDER — NALOXONE HYDROCHLORIDE 0.4 MG/ML
0.2 INJECTION, SOLUTION INTRAMUSCULAR; INTRAVENOUS; SUBCUTANEOUS
Status: DISCONTINUED | OUTPATIENT
Start: 2020-11-23 | End: 2020-11-23 | Stop reason: HOSPADM

## 2020-11-23 RX ORDER — LABETALOL HYDROCHLORIDE 5 MG/ML
5 INJECTION, SOLUTION INTRAVENOUS EVERY 10 MIN PRN
Status: DISCONTINUED | OUTPATIENT
Start: 2020-11-23 | End: 2020-11-23 | Stop reason: HOSPADM

## 2020-11-23 RX ORDER — PHENAZOPYRIDINE HYDROCHLORIDE 100 MG/1
100 TABLET, FILM COATED ORAL 3 TIMES DAILY PRN
Qty: 9 TABLET | Refills: 0 | Status: SHIPPED | OUTPATIENT
Start: 2020-11-23 | End: 2021-02-09

## 2020-11-23 RX ORDER — FENTANYL CITRATE 50 UG/ML
25-50 INJECTION, SOLUTION INTRAMUSCULAR; INTRAVENOUS EVERY 5 MIN PRN
Status: DISCONTINUED | OUTPATIENT
Start: 2020-11-23 | End: 2020-11-23 | Stop reason: HOSPADM

## 2020-11-23 RX ORDER — NALOXONE HYDROCHLORIDE 0.4 MG/ML
0.4 INJECTION, SOLUTION INTRAMUSCULAR; INTRAVENOUS; SUBCUTANEOUS
Status: DISCONTINUED | OUTPATIENT
Start: 2020-11-23 | End: 2020-11-23 | Stop reason: HOSPADM

## 2020-11-23 RX ORDER — OXYCODONE HYDROCHLORIDE 5 MG/1
5 TABLET ORAL EVERY 6 HOURS PRN
Qty: 6 TABLET | Refills: 0 | Status: SHIPPED | OUTPATIENT
Start: 2020-11-23 | End: 2021-11-29

## 2020-11-23 RX ORDER — TAMSULOSIN HYDROCHLORIDE 0.4 MG/1
0.4 CAPSULE ORAL DAILY
Qty: 30 CAPSULE | Refills: 0 | Status: SHIPPED | OUTPATIENT
Start: 2020-11-23 | End: 2021-02-09

## 2020-11-23 RX ORDER — LIDOCAINE HYDROCHLORIDE 20 MG/ML
INJECTION, SOLUTION INFILTRATION; PERINEURAL PRN
Status: DISCONTINUED | OUTPATIENT
Start: 2020-11-23 | End: 2020-11-23

## 2020-11-23 RX ORDER — HYDRALAZINE HYDROCHLORIDE 20 MG/ML
2.5-5 INJECTION INTRAMUSCULAR; INTRAVENOUS EVERY 10 MIN PRN
Status: DISCONTINUED | OUTPATIENT
Start: 2020-11-23 | End: 2020-11-23 | Stop reason: HOSPADM

## 2020-11-23 RX ORDER — DEXMEDETOMIDINE HYDROCHLORIDE 4 UG/ML
0.2-1.2 INJECTION, SOLUTION INTRAVENOUS CONTINUOUS
Status: DISCONTINUED | OUTPATIENT
Start: 2020-11-23 | End: 2020-11-23 | Stop reason: CLARIF

## 2020-11-23 RX ADMIN — FENTANYL CITRATE 50 MCG: 50 INJECTION, SOLUTION INTRAMUSCULAR; INTRAVENOUS at 07:58

## 2020-11-23 RX ADMIN — PHENYLEPHRINE HYDROCHLORIDE 100 MCG: 10 INJECTION INTRAVENOUS at 08:47

## 2020-11-23 RX ADMIN — Medication 5 MG: at 08:57

## 2020-11-23 RX ADMIN — DIPHENHYDRAMINE HYDROCHLORIDE 25 MG: 50 INJECTION INTRAMUSCULAR; INTRAVENOUS at 09:58

## 2020-11-23 RX ADMIN — PROPOFOL 120 MG: 10 INJECTION, EMULSION INTRAVENOUS at 08:12

## 2020-11-23 RX ADMIN — Medication 5 MG: at 08:20

## 2020-11-23 RX ADMIN — PHENYLEPHRINE HYDROCHLORIDE 100 MCG: 10 INJECTION INTRAVENOUS at 08:16

## 2020-11-23 RX ADMIN — PHENYLEPHRINE HYDROCHLORIDE 100 MCG: 10 INJECTION INTRAVENOUS at 08:39

## 2020-11-23 RX ADMIN — FENTANYL CITRATE 100 MCG: 50 INJECTION, SOLUTION INTRAMUSCULAR; INTRAVENOUS at 08:05

## 2020-11-23 RX ADMIN — FENTANYL CITRATE 50 MCG: 50 INJECTION, SOLUTION INTRAMUSCULAR; INTRAVENOUS at 09:30

## 2020-11-23 RX ADMIN — MIDAZOLAM 2 MG: 1 INJECTION INTRAMUSCULAR; INTRAVENOUS at 07:51

## 2020-11-23 RX ADMIN — FENTANYL CITRATE 150 MCG: 50 INJECTION, SOLUTION INTRAMUSCULAR; INTRAVENOUS at 07:52

## 2020-11-23 RX ADMIN — DEXMEDETOMIDINE HYDROCHLORIDE 16 MCG: 100 INJECTION, SOLUTION INTRAVENOUS at 09:06

## 2020-11-23 RX ADMIN — SODIUM CHLORIDE, POTASSIUM CHLORIDE, SODIUM LACTATE AND CALCIUM CHLORIDE: 600; 310; 30; 20 INJECTION, SOLUTION INTRAVENOUS at 08:05

## 2020-11-23 RX ADMIN — CIPROFLOXACIN 400 MG: 2 INJECTION INTRAVENOUS at 08:15

## 2020-11-23 RX ADMIN — ACETAMINOPHEN 975 MG: 325 TABLET, FILM COATED ORAL at 06:41

## 2020-11-23 RX ADMIN — FENTANYL CITRATE 50 MCG: 50 INJECTION, SOLUTION INTRAMUSCULAR; INTRAVENOUS at 07:55

## 2020-11-23 RX ADMIN — MIDAZOLAM 2 MG: 1 INJECTION INTRAMUSCULAR; INTRAVENOUS at 08:04

## 2020-11-23 RX ADMIN — LIDOCAINE HYDROCHLORIDE 100 MG: 20 INJECTION, SOLUTION INFILTRATION; PERINEURAL at 08:11

## 2020-11-23 RX ADMIN — PROPOFOL 30 MG: 10 INJECTION, EMULSION INTRAVENOUS at 08:30

## 2020-11-23 RX ADMIN — DEXAMETHASONE SODIUM PHOSPHATE 8 MG: 4 INJECTION, SOLUTION INTRA-ARTICULAR; INTRALESIONAL; INTRAMUSCULAR; INTRAVENOUS; SOFT TISSUE at 08:20

## 2020-11-23 RX ADMIN — ONDANSETRON 4 MG: 2 INJECTION INTRAMUSCULAR; INTRAVENOUS at 08:20

## 2020-11-23 ASSESSMENT — ENCOUNTER SYMPTOMS: SEIZURES: 1

## 2020-11-23 ASSESSMENT — MIFFLIN-ST. JEOR: SCORE: 1325.5

## 2020-11-23 NOTE — DISCHARGE INSTRUCTIONS
[facility]  Same-Day Surgery   Adult Discharge Orders & Instructions     For 24 hours after surgery    1. Get plenty of rest.  A responsible adult must stay with you for at least 24 hours after you leave the hospital.   2. Do not drive or use heavy equipment.  If you have weakness or tingling, don't drive or use heavy equipment until this feeling goes away.  3. Do not drink alcohol.  4. Avoid strenuous or risky activities.  Ask for help when climbing stairs.   5. You may feel lightheaded.  IF so, sit for a few minutes before standing.  Have someone help you get up.   6. If you have nausea (feel sick to your stomach): Drink only clear liquids such as apple juice, ginger ale, broth or 7-Up.  Rest may also help.  Be sure to drink enough fluids.  Move to a regular diet as you feel able.  7. You may have a slight fever. Call the doctor if your fever is over 100 F (37.7 C) (taken under the tongue) or lasts longer than 24 hours.  8. You may have a dry mouth, a sore throat, muscle aches or trouble sleeping.  These should go away after 24 hours.  9. Do not make important or legal decisions.   Call your doctor for any of the followin.  Signs of infection (fever, growing tenderness at the surgery site, a large amount of drainage or bleeding, severe pain, foul-smelling drainage, redness, swelling).    2. It has been over 8 to 10 hours since surgery and you are still not able to urinate (pass water).    3.  Headache for over 24 hours.    To contact a doctor during clinic hours, call Dr. Wally Britt at [phone]--Urology Clinic  or:        After hours:  [phone] and ask for the resident on call for Urology (answered 24 hours a day)      Emergency Department:    [facility]: [phone]       (TTY for hearing impaired: [phone])

## 2020-11-23 NOTE — ANESTHESIA CARE TRANSFER NOTE
Patient: Mona Wagner    Procedure(s):  CYSTOSCOPY, CYSOTGRAM, WITH RETROGRADE PYELOGRAM, ureteroscopy,  AND URETERAL STENT    Diagnosis: Ureter, stricture [N13.5]  Diagnosis Additional Information: No value filed.    Anesthesia Type:   General     Note:  Airway :Nasal Cannula  Patient transferred to:PACU  Comments: Patient spontaneously breathing, opening eyes. LMA removed. Transferred to PACU. Report to RN. Vital signs stable. Awake and talking in PACU. Handoff Report: Identifed the Patient, Identified the Reponsible Provider, Reviewed the pertinent medical history, Discussed the surgical course, Reviewed Intra-OP anesthesia mangement and issues during anesthesia, Set expectations for post-procedure period and Allowed opportunity for questions and acknowledgement of understanding      Vitals: (Last set prior to Anesthesia Care Transfer)    CRNA VITALS  11/23/2020 0850 - 11/23/2020 0929      11/23/2020             Resp Rate (observed):  SpO2:  15  99%                Electronically Signed By: ALCIDES Topete CRNA  November 23, 2020  9:29 AM

## 2020-11-23 NOTE — OR NURSING
Call placed to Dr. Burgess. Upon patient waking, she complains of itching proximal her abdomen. Slight hives present abdomen, chest. VORB for one time dose 25 mg Benadryl IV.

## 2020-11-23 NOTE — BRIEF OP NOTE
[facility]     Brief Operative Note    Pre-operative diagnosis: Ureter, stricture [N13.5]  Post-operative diagnosis Same as preoperative diagnosis    Procedure: Procedure(s):  CYSTOSCOPY, CYSOTGRAM, WITH RETROGRADE PYELOGRAM, ureteroscopy,  AND URETERAL STENT  Surgeon: Surgeon(s) and Role:     * Wally Britt MD - Primary     * Trav Pereira MD - Resident - Assisting  Anesthesia: General   Estimated blood loss: 1mL  Drains:  6x22 Double J Ureteral stent  Specimens: * No specimens in log *  Findings:   Reflux of Transplant Ureter on cystogram, Stricture at site of reimplant, unable to pass flexible ureteroscope.  Complications: None.  Implants:   Implant Name Type Inv. Item Serial No.  Lot No. LRB No. Used Action   STENT URETERAL PERCUFLEX PLUS [id number] Stent STENT URETERAL PERCUFLEX PLUS [id number] TRANSPLANT URETER [employer] [id number] Right 1 Implanted

## 2020-11-23 NOTE — ANESTHESIA POSTPROCEDURE EVALUATION
Anesthesia POST Procedure Evaluation    Patient: Mona Wagner   MRN:     [id number] Gender:   female   Age:    30 year old :      1990        Preoperative Diagnosis: Ureter, stricture [N13.5]   Procedure(s):  CYSTOSCOPY, CYSOTGRAM, WITH RETROGRADE PYELOGRAM, ureteroscopy,  AND URETERAL STENT   Postop Comments: No value filed.     Anesthesia Type: General       Disposition: Outpatient   Postop Pain Control: Uneventful            Sign Out: Well controlled pain   PONV: No   Neuro/Psych: Uneventful            Sign Out: Acceptable/Baseline neuro status   Airway/Respiratory: Uneventful            Sign Out: Acceptable/Baseline resp. status   CV/Hemodynamics: Uneventful            Sign Out: Acceptable CV status   Other NRE:    DID A NON-ROUTINE EVENT OCCUR? YES    Event details/Postop Comments:  Paradoxical reaction to nitrous oxide with severe anxiety and panic attack.         Last Anesthesia Record Vitals:  CRNA VITALS  2020 0850 - 2020 0950      2020             Resp Rate (observed):  (!) 1          Last PACU Vitals:  Vitals Value Taken Time   /75 20 1043   Temp 36.7  C (98.1  F) 20 1043   Pulse 78 20 1043   Resp 16 20 1043   SpO2 97 % 20 1043   Temp src     NIBP     Pulse     SpO2     Resp     Temp     Ht Rate     Temp 2           Electronically Signed By: Kurt Patel MD, 2020, 11:34 AM

## 2020-11-23 NOTE — ANESTHESIA PREPROCEDURE EVALUATION
Anesthesia Pre-Procedure Evaluation    Patient: Mona Wagner   MRN:     [id number] Gender:   female   Age:    29 year old :      1990        Preoperative Diagnosis: Stricture or kinking of ureter [N13.5]   Procedure(s):  CYSTOSCOPY, WITH RETROGRADE PYELOGRAM AND URETERAL STENT INSERTION - TRANSPLANT KIDNEY     LABS:  CBC:   Lab Results   Component Value Date    WBC 6.5 2020    WBC 7.3 10/05/2020    HGB 11.8 2020    HGB 11.8 10/05/2020    HCT 37.9 2020    HCT 38.4 10/05/2020     2020     10/05/2020     BMP:   Lab Results   Component Value Date     2020     2020    POTASSIUM 3.7 2020    POTASSIUM 4.2 2020    CHLORIDE 107 2020    CHLORIDE 110 (H) 2020    CO2 26 2020    CO2 20 2020    BUN 20 2020    BUN 21 2020    CR 0.90 2020    CR 0.99 2020     (H) 2020    GLC 86 2020     COAGS:   Lab Results   Component Value Date    PTT 33 2019    INR 0.95 2019    FIBR 311 2017     POC:   Lab Results   Component Value Date     (H) 2020    HCG Negative 2020    HCGS Negative 2019     OTHER:   Lab Results   Component Value Date    LACT 1.7 2020    A1C 5.5 2020    SHAMIR 9.4 2020    PHOS 2.6 2020    MAG 1.5 (L) 2020    ALBUMIN 4.0 2020    PROTTOTAL 7.9 2020    ALT 19 2020    AST 11 2020    ALKPHOS 185 (H) 2020    BILITOTAL 0.7 2020    BILIDIRECT 0.2 2013    TSH 2.13 2020    T4 1.02 2020    T3 97 2019    CRP 26.0 (H) 2020    SED 29 (H) 2020        Preop Vitals    BP Readings from Last 3 Encounters:   20 110/72   10/09/20 99/68   10/04/20 120/81    Pulse Readings from Last 3 Encounters:   10/09/20 116   10/04/20 79   20 78      Resp Readings from Last 3 Encounters:   10/04/20 15   20 16   08/15/20 16    SpO2 Readings from Last 3  Encounters:   10/04/20 97%   08/20/20 100%   08/15/20 98%      Temp Readings from Last 1 Encounters:   11/23/20 36.6  C (97.9  F)    Ht Readings from Last 1 Encounters:   11/23/20 1.524 m (5')      Wt Readings from Last 1 Encounters:   11/23/20 67.4 kg (148 lb 9.4 oz)    Estimated body mass index is 29.02 kg/m  as calculated from the following:    Height as of an earlier encounter on 11/23/20: 1.524 m (5').    Weight as of an earlier encounter on 11/23/20: 67.4 kg (148 lb 9.4 oz).     LDA:  Peripheral IV 11/17/20 Right;Anterior Upper arm (Active)   Number of days: 6       Hemodialysis Vascular Access Arteriovenous fistula Left Forearm (Active)   Number of days:         Past Medical History:   Diagnosis Date     Anemia in chronic kidney disease      Dialysis patient (H)      End stage renal disease on dialysis (H)      Endocarditis      History of DVT (deep vein thrombosis) 2014     History of seizure 2014     Hypertension      Hypothyroid      Kidney transplanted 08/03/2019    DCD DDKT. Induction with thymo 6mg/kg.     Pituitary adenoma (H)      Pyelonephritis of transplanted kidney 01/23/2020      Past Surgical History:   Procedure Laterality Date     BENCH KIDNEY N/A 8/3/2019    Procedure: BACKBENCH PREPARATION, ALLOGRAFT, KIDNEY;  Surgeon: Dorian Johnson MD;  [facility] OR     COMBINED CYSTOSCOPY, RETROGRADES, URETEROSCOPY, LASER HOLMIUM LITHOTRIPSY URETER(S), INSERT STENT N/A 12/6/2019    Procedure: CYSTOURETEROSCOPY, WITH RETROGRADE PYELOGRAM of transplant kidney, STENT INSERTION, Laser on standby;  Surgeon: Wally Britt MD;  [facility] OR     CREATE FISTULA ARTERIOVENOUS UPPER EXTREMITY  1/2/2014    Procedure: CREATE FISTULA ARTERIOVENOUS UPPER EXTREMITY;  Left Wrist Arteriovenous Fistula Placement;  Surgeon: Shashi Castro MD;  Location: [facility] OR     CYSTOSCOPY, RETROGRADES, INSERT STENT URETER(S), COMBINED N/A 8/20/2020    Procedure: CYSTOSCOPY, WITH RETROGRADE PYELOGRAM, CYSTOGRAM AND  URETERAL STENT INSERTION - TRANSPLANT KIDNEY;  Surgeon: Wally Britt MD;  Location: [facility] OR     PERCUTANEOUS BIOPSY KIDNEY Right 2019    Procedure: Right Kidney Biopsy;  Surgeon: Deny Rios MD;  Location: [facility] OR     RASTA/DIALYSIS CATHETER  12/10/2013          TRANSPLANT KIDNEY RECIPIENT  DONOR N/A 8/3/2019    Procedure: TRANSPLANT, KIDNEY, RECIPIENT,  DONOR with Ureteral Stent Placement;  Surgeon: Dorian Johnson MD;  Location: [facility] OR      Allergies   Allergen Reactions     Contrast Dye Rash     CT contrast allergy 19 rash over eyes. Need to have pre medication before a CT WITH CONTRAST      Chlorhexidine Rash     Rash at site     Sulfa Drugs Rash     Muscle stiffness of neck     Dapsone      Methemoglobinemia     Furosemide Other (See Comments)     Skin flushing     Lisinopril Swelling     angioedema     Metrogel [Metronidazole]      Hives diffusely on body after vaginal administration.     Azithromycin Dizziness and Rash     Cefuroxime Rash        Anesthesia Evaluation     . Pt has had prior anesthetic. Type: General    No history of anesthetic complications          ROS/MED HX    ENT/Pulmonary:  - neg pulmonary ROS     Neurologic:     (+)seizures     Cardiovascular: Comment: H/o endocarditis    (+) hypertension----. : . . . :. .       METS/Exercise Tolerance:  4 - Raking leaves, gardening   Hematologic:  - neg hematologic  ROS       Musculoskeletal:  - neg musculoskeletal ROS       GI/Hepatic:  - neg GI/hepatic ROS       Renal/Genitourinary:     (+) chronic renal disease, type: ESRD, Pt does not require dialysis, Pt has history of transplant,       Endo:     (+) thyroid problem hypothyroidism, .      Psychiatric:  - neg psychiatric ROS       Infectious Disease:         Malignancy:         Other:                         PHYSICAL EXAM:   Mental Status/Neuro: A/A/O   Airway: Facies: Feasible   Respiratory:   Resp. Rate: Normal     Resp. Effort: Normal      CV: Rhythm:  Regular  Rate: Age appropriate   Comments:                      Assessment:   ASA SCORE: 3    H&P: History and physical reviewed and following examination; no interval change.   Smoking Status:  Non-Smoker/Unknown   NPO Status: NPO Appropriate     Plan:   Anes. Type:  General   Pre-Medication: Midazolam   Induction:  IV (Standard)   Airway: LMA   Access/Monitoring: PIV   Maintenance: Balanced     Postop Plan:   Postop Pain: None  Postop Sedation/Airway: Not planned  Disposition: Outpatient     PONV Management:   Adult Risk Factors: Female, Non-Smoker   Prevention: Ondansetron, Dexamethasone     CONSENT: Via Surgical Consent   Plan and risks discussed with: Patient                          Kurt Patel MD

## 2020-11-23 NOTE — OP NOTE
DATE OF SURGERY: 11/23/20    PREOPERATIVE DIAGNOSIS: Hydronephrosis of Transplanted Kidney     POSTOPERATIVE DIAGNOSIS: Same as preoperative diagnosis    PROCEDURES PERFORMED:   1. Cystourethroscopy  2. Cystogram  3. Retrograde Pyelogram of Transplanted Ureter  4. Ureteroscopy of Transplanted Ureter  5. Double J Ureteral Stent Placement of Transplanted Ureter    6. Intraoperative interpretation of fluoroscopic imaging.     STAFF SURGEON: Wally Britt MD    ASSISTANT: Trav Pereira MD MS    ANESTHESIA: General, LMA    ESTIMATED BLOOD LOSS: 1 mL.     IV FLUIDS: See Anesthesia Records    COMPLICATIONS: None.     SIGNIFICANT FINDINGS: Reflux of transplanted ureter.  Despite this, we were unable to advance the ureteroscope.  There wasn't an obvious stricture seen on retrograde pyelogram.    BRIEF OPERATIVE INDICATIONS:   Mona Wagner is a 30 year old female with history of ESKD due to IgA nephropathy who underwent right kidney transplant on 8/3/2019. Her post-operative course has been complicated by hydronephrosis of the transplanted kidney, with multiple ureteral stent placements, most recently on 8/20/2020 with no stricture noted at that time. Her stent was removed on 10/9/20 and repeat renogram showed persistent obstruction of her transplanted kidney. After discussion of the risks, benefits and alternatives, she elected to undergo the above listed procedures.     DESCRIPTION OF PROCEDURE:    After full informed voluntary consent was obtained, the patient was transported to the operating room, placed supine on the table. After adequate anesthesia was induced, they were prepped and draped in the usual sterile fashion. A timeout was taken to confirm correct patient, procedure and laterality.     A 22 Portuguese rigid cystoscope sheath and 30-degree angle lens was inserted into a well lubricated urethra. The urethra was unremarkable. The media was clear and no tumors, stones or diverticula were visualized in the  bladder on brief cystoscopic examination. The bladder was drained. Spot films were taken to identify the bladder and transplanted kidney.     First, we began with a cystogram. 150cc of contrast were injected into the bladder through the cystoscope, followed by normal saline until the bladder was full. Serial images were taken during injection of the contrast and normal saline, and reflux of the transplanted ureter was noted up to the level of the renal pelvis. The bladder was then drained.    Next, with the aid of a JV-1 catheter, a guidewire was advanced up the transplanted ureter under fluoroscopic guidance. The JV1 catheter was removed and a dual lumen catheter was inserted over the wire, through the cystoscope, up to the insertion point of the transplanted ureter. A retrograde pyelogram demonstrated no filling defects. The dual lumen catheter was inserted proximally and repeat retrograde pyelogram was again unremarkable.  A super stiff wire was then advanced through the second lumen up to the level of the transplanted renal pelvis and the dual lumen wire was then removed.     We next attempted to advance the flexible ureteroscope over the guidewire, but met resistance at the insertion point of the transplanted ureter into the bladder. After several attempts to advance the ureteroscope, we were unable to advanced beyond the stricture at the insertion point of the transplanted ureter into the bladder.     The flexible ureteroscope and guidewire was removed, and a 6x22 Double J ureteral stent was advanced over the superstiff wire with fluroscopic guidance and the aid of the pusher.  Good curl was appreciated in the pelvis.  Stent passed up easily with no appreciable resistance.  The superstiff wire was then removed and a good curl was appreciated in the renal pelvis as well as the bladder.  The bladder was then drained.  Patient tolerated the procedure well.  No apparent complications. She was transported to the  postanesthesia care unit in stable condition.     PLAN: Follow up in 3 months for stent removal in clinic.    Attestation:  I was present for the entire procedure on 11/23/20.  MARCUS Britt MD

## 2020-11-23 NOTE — ANESTHESIA PROCEDURE NOTES
Airway   Date/Time: 11/23/2020 8:13 AM   Patient location during procedure: OR    Staff -   CRNA: Karina Francois APRN CRNA  Performed By: CRNA    Consent for Airway   Urgency: elective    Indications and Patient Condition  Indications for airway management: beatriz-procedural  Induction type:intravenousMask difficulty assessment: 1 - vent by mask    Final Airway Details  Final airway type: supraglottic airway    Endotracheal Airway Details   Secured with: pink tape    Post intubation assessment   Placement verified by: capnometry, equal breath sounds and chest rise   Number of attempts at approach: 1  Secured with:pink tape  Ease of procedure: easy  Dentition: Intact and Unchanged

## 2020-11-23 NOTE — OR NURSING
Improvement in abdominal hives s/p Benadryl administration. Dr. Burgess at bedside; he will place sign out shortly.

## 2020-12-01 DIAGNOSIS — Z94.0 KIDNEY REPLACED BY TRANSPLANT: Primary | ICD-10-CM

## 2020-12-01 DIAGNOSIS — Z94.0 KIDNEY REPLACED BY TRANSPLANT: ICD-10-CM

## 2020-12-01 DIAGNOSIS — Z79.899 ENCOUNTER FOR LONG-TERM CURRENT USE OF MEDICATION: ICD-10-CM

## 2020-12-01 DIAGNOSIS — Z48.298 AFTERCARE FOLLOWING ORGAN TRANSPLANT: ICD-10-CM

## 2020-12-01 DIAGNOSIS — N39.0 RECURRENT UTI: ICD-10-CM

## 2020-12-01 LAB
ANION GAP SERPL CALCULATED.3IONS-SCNC: 6 MMOL/L (ref 3–14)
BUN SERPL-MCNC: 26 MG/DL (ref 7–30)
CALCIUM SERPL-MCNC: 9.3 MG/DL (ref 8.5–10.1)
CHLORIDE SERPL-SCNC: 110 MMOL/L (ref 94–109)
CO2 SERPL-SCNC: 22 MMOL/L (ref 20–32)
CREAT SERPL-MCNC: 0.98 MG/DL (ref 0.52–1.04)
ERYTHROCYTE [DISTWIDTH] IN BLOOD BY AUTOMATED COUNT: 13.3 % (ref 10–15)
GFR SERPL CREATININE-BSD FRML MDRD: 78 ML/MIN/{1.73_M2}
GLUCOSE SERPL-MCNC: 93 MG/DL (ref 70–99)
HCT VFR BLD AUTO: 39.9 % (ref 35–47)
HGB BLD-MCNC: 12 G/DL (ref 11.7–15.7)
MCH RBC QN AUTO: 27.6 PG (ref 26.5–33)
MCHC RBC AUTO-ENTMCNC: 30.1 G/DL (ref 31.5–36.5)
MCV RBC AUTO: 92 FL (ref 78–100)
PLATELET # BLD AUTO: 265 10E9/L (ref 150–450)
POTASSIUM SERPL-SCNC: 4 MMOL/L (ref 3.4–5.3)
RBC # BLD AUTO: 4.35 10E12/L (ref 3.8–5.2)
SODIUM SERPL-SCNC: 139 MMOL/L (ref 133–144)
TACROLIMUS BLD-MCNC: 5.5 UG/L (ref 5–15)
TME LAST DOSE: NORMAL H
WBC # BLD AUTO: 8.7 10E9/L (ref 4–11)

## 2020-12-01 PROCEDURE — 85027 COMPLETE CBC AUTOMATED: CPT | Performed by: INTERNAL MEDICINE

## 2020-12-01 PROCEDURE — 87799 DETECT AGENT NOS DNA QUANT: CPT | Performed by: INTERNAL MEDICINE

## 2020-12-01 PROCEDURE — 80197 ASSAY OF TACROLIMUS: CPT | Performed by: INTERNAL MEDICINE

## 2020-12-01 PROCEDURE — 36415 COLL VENOUS BLD VENIPUNCTURE: CPT

## 2020-12-01 PROCEDURE — 80048 BASIC METABOLIC PNL TOTAL CA: CPT | Performed by: INTERNAL MEDICINE

## 2020-12-06 ENCOUNTER — MYC REFILL (OUTPATIENT)
Dept: FAMILY MEDICINE | Facility: CLINIC | Age: 30
End: 2020-12-06

## 2020-12-06 DIAGNOSIS — I95.1 ORTHOSTATIC HYPOTENSION: ICD-10-CM

## 2020-12-07 RX ORDER — FLUDROCORTISONE ACETATE 0.1 MG/1
0.1 TABLET ORAL
Qty: 90 TABLET | Refills: 0 | Status: SHIPPED | OUTPATIENT
Start: 2020-12-07 | End: 2021-07-21

## 2020-12-16 DIAGNOSIS — Z94.0 KIDNEY REPLACED BY TRANSPLANT: Primary | ICD-10-CM

## 2020-12-16 DIAGNOSIS — N13.5 STRICTURE OR KINKING OF URETER: ICD-10-CM

## 2020-12-16 LAB
ANION GAP SERPL CALCULATED.3IONS-SCNC: 6 MMOL/L (ref 3–14)
BUN SERPL-MCNC: 24 MG/DL (ref 7–30)
CALCIUM SERPL-MCNC: 9.3 MG/DL (ref 8.5–10.1)
CHLORIDE SERPL-SCNC: 109 MMOL/L (ref 94–109)
CO2 SERPL-SCNC: 23 MMOL/L (ref 20–32)
CREAT SERPL-MCNC: 1.02 MG/DL (ref 0.52–1.04)
GFR SERPL CREATININE-BSD FRML MDRD: 75 ML/MIN/{1.73_M2}
GLUCOSE SERPL-MCNC: 92 MG/DL (ref 70–99)
POTASSIUM SERPL-SCNC: 4.8 MMOL/L (ref 3.4–5.3)
SODIUM SERPL-SCNC: 138 MMOL/L (ref 133–144)

## 2020-12-16 PROCEDURE — 80048 BASIC METABOLIC PNL TOTAL CA: CPT | Performed by: UROLOGY

## 2020-12-19 ENCOUNTER — MYC REFILL (OUTPATIENT)
Dept: FAMILY MEDICINE | Facility: CLINIC | Age: 30
End: 2020-12-19

## 2020-12-19 DIAGNOSIS — Z94.0 KIDNEY TRANSPLANTED: ICD-10-CM

## 2020-12-19 DIAGNOSIS — E83.42 HYPOMAGNESEMIA: ICD-10-CM

## 2020-12-22 ENCOUNTER — PRE VISIT (OUTPATIENT)
Dept: MATERNAL FETAL MEDICINE | Facility: CLINIC | Age: 30
End: 2020-12-22

## 2020-12-24 ENCOUNTER — VIRTUAL VISIT (OUTPATIENT)
Dept: MATERNAL FETAL MEDICINE | Facility: CLINIC | Age: 30
End: 2020-12-24
Attending: OBSTETRICS & GYNECOLOGY
Payer: MEDICARE

## 2020-12-24 DIAGNOSIS — Z31.69 ENCOUNTER FOR PRECONCEPTION CONSULTATION: ICD-10-CM

## 2020-12-24 PROCEDURE — 99204 OFFICE O/P NEW MOD 45 MIN: CPT | Mod: 95 | Performed by: OBSTETRICS & GYNECOLOGY

## 2020-12-24 NOTE — PROGRESS NOTES
"Mona Wagner is a 30 year old female who is being evaluated via a billable telephone visit.      The patient has been notified of following:     \"This telephone visit will be conducted via a call between you and your physician/provider. We have found that certain health care needs can be provided without the need for a physical exam.  This service lets us provide the care you need with a short phone conversation.  If a prescription is necessary we can send it directly to your pharmacy.  If lab work is needed we can place an order for that and you can then stop by our lab to have the test done at a later time.    Telephone visits are billed at different rates depending on your insurance coverage. During this emergency period, for some insurers they may be billed the same as an in-person visit.  Please reach out to your insurance provider with any questions.    If during the course of the call the physician/provider feels a telephone visit is not appropriate, you will not be charged for this service.\"    Patient has given verbal consent for Telephone visit?  Yes    What phone number would you like to be contacted at? [phone]    How would you like to obtain your AVS? Clemenciahart    Phone call duration: 45 minutes      Maternal-Fetal Medicine Consultation    Mona Wagner  : 1990  MRN: [id number]    REFERRAL:  oMna Wagner is a 30 year old sent by Dr. Isabel Lomeli from [facility] for MFM consultation.    HPI:  Mona Wagner is a 30 year old G0 here for MFM consultation regarding preconception counseling due to history of renal transplant.    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 She underwent  donor kidney transplant 2019 with Dr. Johnson. The graft is located in the right iliac fossa. She follows closely with Dr. Barr, transplant nephrology. She is currently on Myfortic and Tacrolimus and denies any history of graft rejection. Her baseline creatinine is " 1.0-1.2. Her post-transplant course has been significant for ureteral strictures with stent placement and hydronephrosis. She has also had urinary tract infections. She follows closely with urology and is due for stent exchange in February per her report.    She has a bicornuate uterus, initially noted in 2013. She was having abnormal uterine bleeding and underwent pelvic US 8/2020, which again demonstrated the bicornuate uterus (report found in media tab).     Patient has a history of DVT at her dialysis catheter site in 2014 for which she was on anticoagulation. She previously had testing including cardiolipin, lupus anticoagulant, Factor 2 and Factor V Leiden testing that were negative. She also had two seizures in 2014 and was found to have a SAH while on anticoagulation. She was on Keppra for years, but was seen by neurology and this was discontinued. She has had no further seizures.     Obstetrics History:  G0    Gynecologic History:  - Menstrual history: irregular, every 2 months, LMP: 12/21/2020  - Last Pap: 3/2020 NILM, HPV neg  - Denies any history of abnormal pap smears  - Denies prior cervical surgery or procedures  - Denies any history of frequent UTIs, vaginal infections, or STIs    Past Medical History:  Past Medical History:   Diagnosis Date     Anemia in chronic kidney disease      Dialysis patient (H)      End stage renal disease on dialysis (H)      Endocarditis      History of DVT (deep vein thrombosis) 2014     History of seizure 2014     Hypertension      Hypothyroid      Kidney transplanted 08/03/2019    DCD DDKT. Induction with thymo 6mg/kg.     Pituitary adenoma (H)      Pyelonephritis of transplanted kidney 01/23/2020       Past Surgical History:  Past Surgical History:   Procedure Laterality Date     BENCH KIDNEY N/A 8/3/2019    Procedure: BACKBENCH PREPARATION, ALLOGRAFT, KIDNEY;  Surgeon: Dorian Johnson MD;  Location: [facility] OR     COMBINED CYSTOSCOPY, RETROGRADES, EXCHANGE STENT  URETER(S) Right 2020    Procedure: CYSTOSCOPY, CYSOTGRAM, WITH RETROGRADE PYELOGRAM, ureteroscopy,  AND URETERAL STENT;  Surgeon: Wally Britt MD;  Location: [facility] OR     COMBINED CYSTOSCOPY, RETROGRADES, URETEROSCOPY, LASER HOLMIUM LITHOTRIPSY URETER(S), INSERT STENT N/A 2019    Procedure: CYSTOURETEROSCOPY, WITH RETROGRADE PYELOGRAM of transplant kidney, STENT INSERTION, Laser on standby;  Surgeon: Wally Britt MD;  Location: [facility] OR     CREATE FISTULA ARTERIOVENOUS UPPER EXTREMITY  2014    Procedure: CREATE FISTULA ARTERIOVENOUS UPPER EXTREMITY;  Left Wrist Arteriovenous Fistula Placement;  Surgeon: Shashi Castro MD;  Location: [facility] OR     CYSTOSCOPY, RETROGRADES, INSERT STENT URETER(S), COMBINED N/A 2020    Procedure: CYSTOSCOPY, WITH RETROGRADE PYELOGRAM, CYSTOGRAM AND URETERAL STENT INSERTION - TRANSPLANT KIDNEY;  Surgeon: Wally Britt MD;  [facility]: [facility]     PERCUTANEOUS BIOPSY KIDNEY Right 2019    Procedure: Right Kidney Biopsy;  Surgeon: Deny Rios MD;  Location: [facility] OR     RASTA/DIALYSIS CATHETER  12/10/2013          TRANSPLANT KIDNEY RECIPIENT  DONOR N/A 8/3/2019    Procedure: TRANSPLANT, KIDNEY, RECIPIENT,  DONOR with Ureteral Stent Placement;  Surgeon: Dorian Johnson MD;  Location: [facility] OR       Current Medications:  Prior to Admission medications    Medication Sig Last Dose Taking? Auth Provider   acetaminophen (TYLENOL) 325 MG tablet Take 2 tablets (650 mg) by mouth every 4 hours as needed for mild pain   Macarena Bowen MD   aspirin (ASA) 81 MG chewable tablet CHEW AND SWALLOW 1 TABLET DAILY  Patient taking differently: Take 81 mg by mouth every morning    Nakul Hill MD   atorvastatin (LIPITOR) 10 MG tablet Take 1 tablet (10 mg) by mouth daily  Patient taking differently: Take 10 mg by mouth every morning    Nakul Hill MD   cinacalcet (SENSIPAR) 30 MG tablet Take 1 tablet (30 mg) by mouth  daily  Patient taking differently: Take 30 mg by mouth every evening    Nakul Hill MD   diphenhydrAMINE (BENADRYL) 25 MG capsule Take 1-2 tablets by mouth every 6 hours PRN itching/allergies  Patient taking differently: Take 25 mg by mouth as needed Take 1-2 tablets by mouth every 6 hours PRN itching/allergies   Macarena Bowen MD   EPINEPHrine (ANY BX GENERIC EQUIV) 0.3 MG/0.3ML injection 2-pack Inject 0.3 mLs (0.3 mg) into the muscle as needed for anaphylaxis   Macarena Bowen MD   fludrocortisone (FLORINEF) 0.1 MG tablet Take 1 tablet (0.1 mg) by mouth Every Mon, Wed, Fri Morning   Elvi Miranda MD   hydrOXYzine (ATARAX) 10 MG tablet Take 1 tablet (10 mg) by mouth 3 times daily as needed for anxiety   Ondina Deleon APRN CNP   lactobacillus rhamnosus, GG, (CULTURELL) capsule Take 1 capsule by mouth 2 times daily    Dean Wilson MD   levothyroxine (SYNTHROID/LEVOTHROID) 25 MCG tablet Take 1 tablet (25 mcg) Tuesday, Thursday, Saturday and Sunday and 1.5 tablets (37.5 mcg) on Monday, Wednesday and Friday every week.  Patient taking differently: Take 25 mcg by mouth every morning Take 1 tablet (25 mcg) Tuesday, Thursday, Saturday and Sunday and 1.5 tablets (37.5 mcg) on Monday, Wednesday and Friday every week.   Melissa Coles MD   magnesium oxide (MAG-OX) 400 MG tablet Take 400 mg by mouth every morning    Dean Wilson MD   methylPREDNISolone (MEDROL) 32 MG tablet Take 1 tablet (32 mg) by mouth daily 12 hours and 2 hours prior to the procedure with IV contrast   Wally Britt MD   MYFORTIC (BRAND) 180 MG EC tablet Take 3 tablets (540 mg) by mouth 2 times daily   Dean Wilson MD   norethindrone (MICRONOR) 0.35 MG tablet Do not restart until your follow up appointment with your surgeon. Discuss restart date at that appointment.   Elvi Miranda MD   oxybutynin (DITROPAN) 5 MG tablet Take 1 tablet (5 mg) by mouth 3 times daily as needed  for bladder spasms   Wally Britt MD   oxyCODONE (ROXICODONE) 5 MG tablet Take 1 tablet (5 mg) by mouth every 6 hours as needed for pain   Wally Britt MD   phenazopyridine (PYRIDIUM) 100 MG tablet Take 1 tablet (100 mg) by mouth 3 times daily as needed for urinary tract discomfort   Wally Britt MD   senna-docusate (SENOKOT-S/PERICOLACE) 8.6-50 MG tablet Take 1-2 tablets by mouth 2 times daily  Patient taking differently: Take 1-2 tablets by mouth 2 times daily as needed    Wally Britt MD   simethicone (MYLICON) 125 MG chewable tablet Take 1 tablet (125 mg) by mouth 4 times daily as needed for intestinal gas   Macarena Bowen MD   sodium bicarbonate 650 MG tablet TAKE 3 TABLETS(1950 MG) BY MOUTH TWICE DAILY  Patient taking differently: Take 325 mg by mouth 2 times daily    Nakul Hill MD   tacrolimus (GENERIC EQUIVALENT) 0.5 MG capsule HOLD   Nakul Hill MD   tacrolimus (GENERIC EQUIVALENT) 1 MG capsule Take 3 capsules (3 mg) by mouth 2 times daily   Nakul Hill MD   tamsulosin (FLOMAX) 0.4 MG capsule Take 1 capsule (0.4 mg) by mouth daily   Wally Britt MD       Allergies:  Contrast dye, Lisinopril, Nitrous oxide, Chlorhexidine, Sulfa drugs, Dapsone, Furosemide, Metrogel [metronidazole], Azithromycin, and Cefuroxime    Social History:   Occupation: Not currently working due to COVID, previously worked as pharmacy tech.  Status: . Lives with  and his family.   Denies use of alcohol, drugs or smoking.    Family History:  Family History   Problem Relation Age of Onset     Hypertension Sister      Diabetes Sister      Cerebrovascular Disease Sister      Kidney Disease Mother      Kidney Disease Sister      Cerebrovascular Disease Father      Coronary Artery Disease No family hx of      Breast Cancer No family hx of      Cancer - colorectal No family hx of      Ovarian Cancer No family hx of      Prostate Cancer No  family hx of      Other Cancer No family hx of      Asthma No family hx of      Anesthesia Reaction No family hx of      Deep Vein Thrombosis (DVT) No family hx of      Denies history of genetic disorders, preeclampsia, thromboembolic disease, bleeding disorders, developmental delay.    ROS:  10-point ROS negative except as in HPI     PHYSICAL EXAM:  Deferred     Other Imagin/2013  Cardiolipin negative  Factor 2 negative  Factor V Leiden negative  Lupus anticoagulant negative    ASSESSMENT/PLAN:  Mona Wagner is a 30 year old G0 here for [facility] consultation regarding:     History of Renal Transplant  We reviewed Ms. Wagner's history in detail regarding her renal transplant. She continues on Myfortic and Tacrolimus. She is planning a pregnancy in the next few months. She has discussed switching medications in preparation for a pregnancy, but wanted to gather more information regarding pregnancy prior to doing so.    We counseled the patient regarding pregnancy in the setting of a renal transplant. In regards to her immunosuppressive medications, tacrolimus, azathioprine and prednisone may be used safely during pregnancy and breast feeding. Mycophenolic acid is teratogenic and should be stopped at least three months prior to pregnancy. Tacrolimus levels will likely fluctuate with pregnancy and require increased monitoring. Use of prednisone increases the risk of developing fetal cleft lip and palate.     Patients also are at an increased risk for maternal complications in pregnancy, particularly elevated blood pressure and preeclampsia. Ms. Wagner has a history of chronic hypertension, though well controlled and not on any medications at this time, which further increases her risk of developing preeclampsia. We recommend baseline HELLP labs and a 24 hour urine protein as well as starting a low dose ASA 81mg daily at 12w to help decrease the risk. Ms. Wagner is already taking ASA 81 mg and it is safe to continue during  early pregnancy as well. There is also an increased risk of developing gestational diabetes and pt should undergo early GDM screening as well. There additionally is an increased risk for urinary tract infections and patients should be regularly screened for infection. Prophylactic antibiotics should be initiated after one infection in pregnancy in this population.    Overall, the long-term course of graft function does not appear to be affected by pregnancy. However, patients who did experience graft loss during or after pregnancy were noted to have a higher pre-pregnancy creatinine (BRIAN Holbrook. Pregnancy in Renal Transplant Recipients and Donors, Seminars in Nephrology 2017.) Ms. Wagner has had a normal creatinine, which is reassuring. There is also the risk of graft rejection. If rejection is suspected, biopsy should be performed prior to 24w. If there is concern for rejection after 24w, empirical treatment should be initiated.     In terms of fetal outcomes, a study by Arina et al showed an increased rate of  birth and fetal growth restriction in patients with a renal transplant (Clinical Transplantation 2017). Vaginal delivery is not affected by the anatomic location of the graft.  section should be reserved for routine obstetrical indications.    Bicornuate uterus  The overall prevalence of uterine malformations in the general population and the population of fertile women is 5.5%. The incidence of Mullerian defects in infertile women is 8.0%. Diagnosis is usually made after evaluation for recurrent pregnancy losses.  Traditionally, the diagnosis was made with hysteroscopy, chromotubation, or sonohysterography to evaluate the internal uterine cavity with laparoscopy used to evaluate the external uterine contour.  Three dimensional ultrasound and saline contrast sonohysterography are being used more frequently for a noninvasive diagnosis.  Maternal renal ultrasound as Mullerian defects can be  associated with additional genitourinary malformations.     Certain complications seem to be more common with certain types of malformations.  Bicornuate uteri are associated with increased rates of  delivery 2-3 times higher than expected in the general pregnant population.  There is also an increased risk for fetal malpresentation and growth restriction.    Recommendations:  History of Renal Transplant    Discussed notifying her transplant nephrologist regarding plan for pregnancy and to adjust medications accordingly. Under supervision of her doctor, Myfortic should be stopped at least three months prior to pregnancy.    Recommend baseline HELLP labs and 24 hour urine protein at the beginning of pregnancy.    Check immunosuppressant levels monthly, or more frequently as recommended by nephrology.    Check creatinine every trimester, or more frequently as recommended by nephrology.    Early glucose tolerance testing during pregnancy.    Screening monthly with UA/Urine culture given her history of UTIs.    Level II anatomy US between 18-20 weeks' gestation    Surveillance for growth restriction with growth ultrasounds every 4-6 weeks.     testing with either weekly NST plus MVP or BPP, starting at 32 weeks' gestation.    Surveillance for signs/symptoms of preeclampsia.    Continue daily ASA 81 mg daily for preeclampsia prevention    Anesthesia consult in third trimester    Mode of delivery dictated by usual obstetric indication; however, care should be taken for ureteral  location if requires  or complicated high laceration repair. If  delivery recommended, to notify transplant surgery to be present available during delivery.    Delivery is indicated based on secondary sequelae (e.g. hypertension, preeclampsia, fetal growth restriction, fetal distress) and underlying disease; generally 38 to 39 weeks  gestation so as to avoid irreversible renal injury.     Bicornuate uterus    Single  cervical length screening transvaginally at the time of the anatomy US.    Tocolysis and steroids as clinically indicated if  labor occurs.    Confirmation of fetal presentation at time of labor.    History of blood transfusion    Recommend blood type and antibody screen at the time of next blood draw to screen for presence of antibodies.    COVID-19 Vaccination    Patient is immunosuppressed and at increased risk for you COVID-19. Given this, recommend patient consider vaccination prior to pregnancy. Although vaccination during pregnancy is not contraindicated and should be discussed, we don't have data regarding the effects of vaccination on pregnancy nor is the efficacy of the vaccination during pregnancy known.    Routine    Advised prenatal vitamin for 3 months prior to conception to decrease risk of neural tube defects.    The patient was seen and evaluated with Dr. Chery.    Thank you for allowing us to participate in the care of your patient. Please do not hesitate to contact us if you have further questions regarding the management of your patient.     Neelima Ray MD  Maternal Fetal Medicine Fellow  2020 7:53 AM      MFM Attending Attestation     I have reviewed the medical records of Mona Pettitua with the Dr. Ray. I spent a total of 45 minutes over the phone with Mona during today's office visit. See note for details; I have made the necessary edits/additions.     Twila Chery MD  Maternal-Fetal Medicine

## 2020-12-24 NOTE — NURSING NOTE
Phone call to Mona initiating preconception consultation with Maternal Fetal Medicine this morning with Dr. Chery and Dr. Ray. Allergies and meds reviewed.     Delicia Alba RN

## 2020-12-26 RX ORDER — MAGNESIUM OXIDE 400 MG/1
400 TABLET ORAL EVERY MORNING
Qty: 180 TABLET | Refills: 0 | Status: SHIPPED | OUTPATIENT
Start: 2020-12-26 | End: 2021-11-29

## 2020-12-28 DIAGNOSIS — N13.5 STRICTURE OR KINKING OF URETER: ICD-10-CM

## 2020-12-28 DIAGNOSIS — Z94.0 KIDNEY REPLACED BY TRANSPLANT: Primary | ICD-10-CM

## 2020-12-28 DIAGNOSIS — Z79.899 ENCOUNTER FOR LONG-TERM CURRENT USE OF MEDICATION: ICD-10-CM

## 2020-12-28 DIAGNOSIS — Z94.0 KIDNEY REPLACED BY TRANSPLANT: ICD-10-CM

## 2020-12-28 DIAGNOSIS — Z48.298 AFTERCARE FOLLOWING ORGAN TRANSPLANT: ICD-10-CM

## 2020-12-28 DIAGNOSIS — N39.0 RECURRENT UTI: ICD-10-CM

## 2020-12-28 LAB
ANION GAP SERPL CALCULATED.3IONS-SCNC: 8 MMOL/L (ref 3–14)
BUN SERPL-MCNC: 26 MG/DL (ref 7–30)
CALCIUM SERPL-MCNC: 9.5 MG/DL (ref 8.5–10.1)
CHLORIDE SERPL-SCNC: 111 MMOL/L (ref 94–109)
CO2 SERPL-SCNC: 20 MMOL/L (ref 20–32)
CREAT SERPL-MCNC: 0.99 MG/DL (ref 0.52–1.04)
ERYTHROCYTE [DISTWIDTH] IN BLOOD BY AUTOMATED COUNT: 13.1 % (ref 10–15)
GFR SERPL CREATININE-BSD FRML MDRD: 78 ML/MIN/{1.73_M2}
GLUCOSE SERPL-MCNC: 93 MG/DL (ref 70–99)
HCT VFR BLD AUTO: 39.5 % (ref 35–47)
HGB BLD-MCNC: 12.5 G/DL (ref 11.7–15.7)
MCH RBC QN AUTO: 28.7 PG (ref 26.5–33)
MCHC RBC AUTO-ENTMCNC: 31.6 G/DL (ref 31.5–36.5)
MCV RBC AUTO: 91 FL (ref 78–100)
PLATELET # BLD AUTO: 245 10E9/L (ref 150–450)
POTASSIUM SERPL-SCNC: 4.4 MMOL/L (ref 3.4–5.3)
RBC # BLD AUTO: 4.36 10E12/L (ref 3.8–5.2)
SODIUM SERPL-SCNC: 138 MMOL/L (ref 133–144)
TACROLIMUS BLD-MCNC: 6.6 UG/L (ref 5–15)
TME LAST DOSE: NORMAL H
WBC # BLD AUTO: 7.2 10E9/L (ref 4–11)

## 2020-12-28 PROCEDURE — 80197 ASSAY OF TACROLIMUS: CPT | Performed by: INTERNAL MEDICINE

## 2020-12-28 PROCEDURE — 87799 DETECT AGENT NOS DNA QUANT: CPT | Performed by: INTERNAL MEDICINE

## 2020-12-28 PROCEDURE — 85027 COMPLETE CBC AUTOMATED: CPT | Performed by: UROLOGY

## 2020-12-28 PROCEDURE — 80048 BASIC METABOLIC PNL TOTAL CA: CPT | Performed by: UROLOGY

## 2021-01-13 NOTE — RESULT ENCOUNTER NOTE
Ms. Kristy,   Enclosed are a copy of your recent laboratory tests.  I have reviewed these results.  The results are as I would expect and I don't think any further laboratory tests are necessary at this time.  There may be other results pending.  If there are, I will send there on to you when they are complete.  You should plan to follow-up as we discussed at your recent visit.  Please let me know if you have any questions.  Thank you for choosing the [facility] for your care.  MARCUS Britt MD.

## 2021-02-02 DIAGNOSIS — Z48.298 AFTERCARE FOLLOWING ORGAN TRANSPLANT: ICD-10-CM

## 2021-02-02 DIAGNOSIS — Z79.899 ENCOUNTER FOR LONG-TERM CURRENT USE OF MEDICATION: ICD-10-CM

## 2021-02-02 DIAGNOSIS — Z94.0 KIDNEY REPLACED BY TRANSPLANT: Primary | ICD-10-CM

## 2021-02-02 DIAGNOSIS — Z94.0 KIDNEY REPLACED BY TRANSPLANT: ICD-10-CM

## 2021-02-02 DIAGNOSIS — N39.0 RECURRENT UTI: ICD-10-CM

## 2021-02-02 DIAGNOSIS — N13.5 STRICTURE OR KINKING OF URETER: ICD-10-CM

## 2021-02-02 LAB
ANION GAP SERPL CALCULATED.3IONS-SCNC: 8 MMOL/L (ref 3–14)
BASOPHILS # BLD AUTO: 0.1 10E9/L (ref 0–0.2)
BASOPHILS NFR BLD AUTO: 1 %
BUN SERPL-MCNC: 29 MG/DL (ref 7–30)
CALCIUM SERPL-MCNC: 9.8 MG/DL (ref 8.5–10.1)
CHLORIDE SERPL-SCNC: 111 MMOL/L (ref 94–109)
CO2 SERPL-SCNC: 21 MMOL/L (ref 20–32)
CREAT SERPL-MCNC: 0.93 MG/DL (ref 0.52–1.04)
DIFFERENTIAL METHOD BLD: NORMAL
EOSINOPHIL # BLD AUTO: 0.1 10E9/L (ref 0–0.7)
EOSINOPHIL NFR BLD AUTO: 1.8 %
ERYTHROCYTE [DISTWIDTH] IN BLOOD BY AUTOMATED COUNT: 12.7 % (ref 10–15)
GFR SERPL CREATININE-BSD FRML MDRD: 84 ML/MIN/{1.73_M2}
GLUCOSE SERPL-MCNC: 93 MG/DL (ref 70–99)
HCT VFR BLD AUTO: 39.9 % (ref 35–47)
HGB BLD-MCNC: 12.7 G/DL (ref 11.7–15.7)
IMM GRANULOCYTES # BLD: 0 10E9/L (ref 0–0.4)
IMM GRANULOCYTES NFR BLD: 0.3 %
LYMPHOCYTES # BLD AUTO: 1.2 10E9/L (ref 0.8–5.3)
LYMPHOCYTES NFR BLD AUTO: 19.8 %
MCH RBC QN AUTO: 28.7 PG (ref 26.5–33)
MCHC RBC AUTO-ENTMCNC: 31.8 G/DL (ref 31.5–36.5)
MCV RBC AUTO: 90 FL (ref 78–100)
MONOCYTES # BLD AUTO: 0.6 10E9/L (ref 0–1.3)
MONOCYTES NFR BLD AUTO: 9.4 %
NEUTROPHILS # BLD AUTO: 4.3 10E9/L (ref 1.6–8.3)
NEUTROPHILS NFR BLD AUTO: 67.7 %
NRBC # BLD AUTO: 0 10*3/UL
NRBC BLD AUTO-RTO: 0 /100
PLATELET # BLD AUTO: 237 10E9/L (ref 150–450)
POTASSIUM SERPL-SCNC: 4.1 MMOL/L (ref 3.4–5.3)
PROT UR-MCNC: 0.46 G/L
PROT/CREAT 24H UR: 0.38 G/G CR (ref 0–0.2)
RBC # BLD AUTO: 4.42 10E12/L (ref 3.8–5.2)
SODIUM SERPL-SCNC: 140 MMOL/L (ref 133–144)
TACROLIMUS BLD-MCNC: 5.9 UG/L (ref 5–15)
TME LAST DOSE: NORMAL H
WBC # BLD AUTO: 6.3 10E9/L (ref 4–11)

## 2021-02-02 PROCEDURE — 80048 BASIC METABOLIC PNL TOTAL CA: CPT | Performed by: UROLOGY

## 2021-02-02 PROCEDURE — 84156 ASSAY OF PROTEIN URINE: CPT | Performed by: INTERNAL MEDICINE

## 2021-02-02 PROCEDURE — 85025 COMPLETE CBC W/AUTO DIFF WBC: CPT | Performed by: UROLOGY

## 2021-02-02 PROCEDURE — 36415 COLL VENOUS BLD VENIPUNCTURE: CPT

## 2021-02-02 PROCEDURE — 80197 ASSAY OF TACROLIMUS: CPT | Performed by: UROLOGY

## 2021-02-02 PROCEDURE — 87799 DETECT AGENT NOS DNA QUANT: CPT | Performed by: INTERNAL MEDICINE

## 2021-02-04 ENCOUNTER — TELEPHONE (OUTPATIENT)
Dept: TRANSPLANT | Facility: CLINIC | Age: 31
End: 2021-02-04

## 2021-02-04 NOTE — LETTER
PHYSICIAN ORDERS      DATE & TIME ISSUED: 2021 7:32 AM  PATIENT NAME: Mona Wagner   : 1990     [facility] MR# [if applicable]: [id number]     DIAGNOSIS:  Hematuria  ICD-10 CODE: R31.9     Please complete the following labs:  PRA/DSA  UPC  UA/UC     Any questions please call: [phone]  Please fax lab results to [phone].      Gelacio Mcintyre MD

## 2021-02-05 DIAGNOSIS — N13.5 STRICTURE OR KINKING OF URETER: Primary | ICD-10-CM

## 2021-02-05 NOTE — TELEPHONE ENCOUNTER
Gelacio Mcintyre MD Schindelholz, Alanna, ERMELINDA             Thanks. Can we just repeat that UPC with her next labs and get DSA? Thanks    Previous Messages    ----- Message -----   From: Laurence Vazquez RN   Sent: 2/2/2021   1:08 PM CST   To: Gelacio Mcintyre MD     Stable creatinine with small rise in UPC. Will have MD follow up 2/11. Sent to 2/11 clinic provider, ADY.           RNCC spoke with Mona.  She does report change in the odor of her urine.  She also reports some discomfort with her stent, also some hematuria (if walking more, urine is pink to red in color).   She has not yet spoken with Dr. Britt regarding this yet.  She does note that the more fluid she drinks, the lighter / paler yellow her urine (without pink tinge).    Mona will repeat labs tomorrow with DSA, UA / UC and UPC.    Mona questioned frequency of hepatic panel, A1c, etc. RNCC advise these are not now routinely drawn by [facility] (now > 1 year post transplant), please do follow with your PCP for their recommendation based on your age and risk factors for how frequently they should be monitoring these values.   Mona voiced understanding.

## 2021-02-08 ENCOUNTER — MYC MEDICAL ADVICE (OUTPATIENT)
Dept: OTHER | Age: 31
End: 2021-02-08

## 2021-02-08 NOTE — TELEPHONE ENCOUNTER
Patient Call: General  Route to LPN    Reason for call: pt returning phone call     Call back needed? Yes    Return Call Needed  Same as documented in contacts section  When to return call?: Greater than one day: Route standard priority

## 2021-02-09 ENCOUNTER — RESULTS ONLY (OUTPATIENT)
Dept: OTHER | Facility: CLINIC | Age: 31
End: 2021-02-09

## 2021-02-09 ENCOUNTER — MEDICAL CORRESPONDENCE (OUTPATIENT)
Dept: HEALTH INFORMATION MANAGEMENT | Facility: CLINIC | Age: 31
End: 2021-02-09

## 2021-02-09 DIAGNOSIS — Z79.899 ENCOUNTER FOR LONG-TERM CURRENT USE OF MEDICATION: ICD-10-CM

## 2021-02-09 DIAGNOSIS — T86.19 HYDRONEPHROSIS OF KIDNEY TRANSPLANT: Primary | ICD-10-CM

## 2021-02-09 DIAGNOSIS — Z94.0 KIDNEY REPLACED BY TRANSPLANT: ICD-10-CM

## 2021-02-09 DIAGNOSIS — N13.30 HYDRONEPHROSIS OF KIDNEY TRANSPLANT: Primary | ICD-10-CM

## 2021-02-09 DIAGNOSIS — N39.0 RECURRENT UTI: ICD-10-CM

## 2021-02-09 DIAGNOSIS — N13.5 STRICTURE OR KINKING OF URETER: ICD-10-CM

## 2021-02-09 DIAGNOSIS — Z48.298 AFTERCARE FOLLOWING ORGAN TRANSPLANT: ICD-10-CM

## 2021-02-09 LAB
ALBUMIN UR-MCNC: 10 MG/DL
APPEARANCE UR: CLEAR
BACTERIA #/AREA URNS HPF: ABNORMAL /HPF
BILIRUB UR QL STRIP: NEGATIVE
COLOR UR AUTO: ABNORMAL
CREAT UR-MCNC: 69 MG/DL
GLUCOSE UR STRIP-MCNC: NEGATIVE MG/DL
HGB UR QL STRIP: ABNORMAL
KETONES UR STRIP-MCNC: NEGATIVE MG/DL
LEUKOCYTE ESTERASE UR QL STRIP: ABNORMAL
MUCOUS THREADS #/AREA URNS LPF: PRESENT /LPF
NITRATE UR QL: NEGATIVE
PH UR STRIP: 7.5 PH (ref 5–7)
PROT UR-MCNC: 0.19 G/L
PROT/CREAT 24H UR: 0.28 G/G CR (ref 0–0.2)
RBC #/AREA URNS AUTO: 16 /HPF (ref 0–2)
SOURCE: ABNORMAL
SP GR UR STRIP: 1.01 (ref 1–1.03)
SQUAMOUS #/AREA URNS AUTO: <1 /HPF (ref 0–1)
UROBILINOGEN UR STRIP-MCNC: NORMAL MG/DL (ref 0–2)
WBC #/AREA URNS AUTO: 1 /HPF (ref 0–5)

## 2021-02-09 PROCEDURE — 36415 COLL VENOUS BLD VENIPUNCTURE: CPT

## 2021-02-09 PROCEDURE — 86833 HLA CLASS II HIGH DEFIN QUAL: CPT | Performed by: STUDENT IN AN ORGANIZED HEALTH CARE EDUCATION/TRAINING PROGRAM

## 2021-02-09 PROCEDURE — 84156 ASSAY OF PROTEIN URINE: CPT | Performed by: INTERNAL MEDICINE

## 2021-02-09 PROCEDURE — 86832 HLA CLASS I HIGH DEFIN QUAL: CPT | Performed by: STUDENT IN AN ORGANIZED HEALTH CARE EDUCATION/TRAINING PROGRAM

## 2021-02-09 PROCEDURE — 87088 URINE BACTERIA CULTURE: CPT | Performed by: STUDENT IN AN ORGANIZED HEALTH CARE EDUCATION/TRAINING PROGRAM

## 2021-02-09 PROCEDURE — 81001 URINALYSIS AUTO W/SCOPE: CPT | Performed by: STUDENT IN AN ORGANIZED HEALTH CARE EDUCATION/TRAINING PROGRAM

## 2021-02-09 PROCEDURE — 87086 URINE CULTURE/COLONY COUNT: CPT | Performed by: STUDENT IN AN ORGANIZED HEALTH CARE EDUCATION/TRAINING PROGRAM

## 2021-02-10 DIAGNOSIS — Z94.0 KIDNEY TRANSPLANTED: ICD-10-CM

## 2021-02-10 DIAGNOSIS — N39.0 URINARY TRACT INFECTION: Primary | ICD-10-CM

## 2021-02-10 DIAGNOSIS — Z30.41 SURVEILLANCE OF PREVIOUSLY PRESCRIBED CONTRACEPTIVE PILL: ICD-10-CM

## 2021-02-10 DIAGNOSIS — E83.42 HYPOMAGNESEMIA: ICD-10-CM

## 2021-02-10 LAB
BACTERIA SPEC CULT: ABNORMAL
BACTERIA SPEC CULT: ABNORMAL
Lab: ABNORMAL
SPECIMEN SOURCE: ABNORMAL

## 2021-02-10 RX ORDER — AMOXICILLIN 500 MG/1
500 CAPSULE ORAL 2 TIMES DAILY
Qty: 20 CAPSULE | Refills: 0 | Status: SHIPPED | OUTPATIENT
Start: 2021-02-10 | End: 2021-05-11

## 2021-02-10 RX ORDER — ACETAMINOPHEN AND CODEINE PHOSPHATE 120; 12 MG/5ML; MG/5ML
SOLUTION ORAL
Qty: 84 TABLET | Refills: 1 | Status: SHIPPED | OUTPATIENT
Start: 2021-02-10 | End: 2021-07-13

## 2021-02-10 NOTE — TELEPHONE ENCOUNTER
Gelacio Mcintyre MD Schindelholz, Alanna, RN             Amoxicillin 500mg PO BID x10d please.      ISSUE:  Urine culture was positive for UTI.    PLAN:  Notify Mona to start abx as above.

## 2021-02-10 NOTE — TELEPHONE ENCOUNTER
Message to physician:    Date of last visit: 3/13/20    Date of next visit if scheduled: none    Last Comprehensive Metabolic Panel:  Sodium   Date Value Ref Range Status   02/02/2021 140 133 - 144 mmol/L Final     Potassium   Date Value Ref Range Status   02/02/2021 4.1 3.4 - 5.3 mmol/L Final     Chloride   Date Value Ref Range Status   02/02/2021 111 (H) 94 - 109 mmol/L Final     Carbon Dioxide   Date Value Ref Range Status   02/02/2021 21 20 - 32 mmol/L Final     Anion Gap   Date Value Ref Range Status   02/02/2021 8 3 - 14 mmol/L Final     Glucose   Date Value Ref Range Status   02/02/2021 93 70 - 99 mg/dL Final     Urea Nitrogen   Date Value Ref Range Status   02/02/2021 29 7 - 30 mg/dL Final     Creatinine   Date Value Ref Range Status   02/02/2021 0.93 0.52 - 1.04 mg/dL Final     GFR Estimate   Date Value Ref Range Status   02/02/2021 84 >60 mL/min/[1.73_m2] Final     Comment:     Non  GFR Calc  Starting 12/18/2018, serum creatinine based estimated GFR (eGFR) will be   calculated using the Chronic Kidney Disease Epidemiology Collaboration   (CKD-EPI) equation.       Calcium   Date Value Ref Range Status   02/02/2021 9.8 8.5 - 10.1 mg/dL Final       BP Readings from Last 3 Encounters:   11/23/20 125/84   10/09/20 99/68   10/04/20 120/81       Lab Results   Component Value Date    A1C 5.5 08/04/2020    A1C 5.7 02/03/2020    A1C 4.8 08/03/2019    A1C 4.8 08/02/2019                Please complete refill and CLOSE ENCOUNTER.  Closing the encounter signifies the refill is complete.

## 2021-02-10 NOTE — TELEPHONE ENCOUNTER
Spoke to patient who confirms knowledge to start   Amoxicillin 500mg PO BID x10d please.       ISSUE:  Urine culture was positive for UTI

## 2021-02-11 ENCOUNTER — ANCILLARY PROCEDURE (OUTPATIENT)
Dept: ULTRASOUND IMAGING | Facility: CLINIC | Age: 31
End: 2021-02-11
Attending: UROLOGY
Payer: MEDICARE

## 2021-02-11 ENCOUNTER — VIRTUAL VISIT (OUTPATIENT)
Dept: NEPHROLOGY | Facility: CLINIC | Age: 31
End: 2021-02-11
Attending: INTERNAL MEDICINE
Payer: MEDICARE

## 2021-02-11 DIAGNOSIS — T86.19 URETERAL STENOSIS OF KIDNEY TRANSPLANT: ICD-10-CM

## 2021-02-11 DIAGNOSIS — E87.20 METABOLIC ACIDOSIS: ICD-10-CM

## 2021-02-11 DIAGNOSIS — I95.1 ORTHOSTATIC HYPOTENSION: ICD-10-CM

## 2021-02-11 DIAGNOSIS — Z94.0 KIDNEY TRANSPLANTED: ICD-10-CM

## 2021-02-11 DIAGNOSIS — N13.5 STRICTURE OR KINKING OF URETER: ICD-10-CM

## 2021-02-11 DIAGNOSIS — N13.5 URETERAL STENOSIS OF KIDNEY TRANSPLANT: ICD-10-CM

## 2021-02-11 DIAGNOSIS — Z94.0 KIDNEY REPLACED BY TRANSPLANT: Primary | ICD-10-CM

## 2021-02-11 DIAGNOSIS — D84.9 IMMUNOSUPPRESSION (H): ICD-10-CM

## 2021-02-11 PROCEDURE — 99214 OFFICE O/P EST MOD 30 MIN: CPT | Mod: 95

## 2021-02-11 PROCEDURE — 76776 US EXAM K TRANSPL W/DOPPLER: CPT | Performed by: RADIOLOGY

## 2021-02-11 RX ORDER — ASPIRIN 81 MG/1
81 TABLET, CHEWABLE ORAL EVERY MORNING
Qty: 90 TABLET | Refills: 3 | Status: SHIPPED | OUTPATIENT
Start: 2021-02-11 | End: 2021-05-12

## 2021-02-11 RX ORDER — CINACALCET 30 MG/1
30 TABLET, FILM COATED ORAL DAILY
Qty: 30 TABLET | Refills: 3 | Status: SHIPPED | OUTPATIENT
Start: 2021-02-11 | End: 2021-10-05

## 2021-02-11 RX ORDER — SODIUM BICARBONATE 650 MG/1
1950 TABLET ORAL 3 TIMES DAILY
Qty: 810 TABLET | Refills: 3 | Status: SHIPPED | OUTPATIENT
Start: 2021-02-11 | End: 2021-05-12

## 2021-02-11 ASSESSMENT — PAIN SCALES - GENERAL: PAINLEVEL: NO PAIN (0)

## 2021-02-11 NOTE — LETTER
2/11/2021       RE: Mona Wagner  [address]  [place] [address]     Dear Colleague,    Thank you for referring your patient, Mona Wagner, to the [facility] [place] at [facility]. Please see a copy of my visit note below.    Mona is a 30 year old who is being evaluated via a billable video visit.      How would you like to obtain your AVS? MyChart  If the video visit is dropped, the invitation should be resent by: Send to e-mail at: [email or web address]  Will anyone else be joining your video visit? No    Video Start Time: 2:41pm  Video-Visit Details    Type of service:  Video Visit    Video End Time:3:00 PM    Originating Location (pt. 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 Stent replaced most recently 11/23/20.    - Baseline Cr ~ 1.0-1.2, most recently 0.93   - Proteinuria: Minimal (0.2-0.5 grams)   - Date DSA Last Checked: Aug/2020      Latest DSA: No   - BK Viremia: No   - Kidney Tx Biopsy: Sep 17, 2019; Result: No diagnostic evidence of acute rejection.      # Immunosuppression: Tacrolimus immediate release (goal 4-6) and Mycophenolic acid (dose 540 mg every 12 hours)   - Changes: No    # Infection Prophylaxis:   Last CD4 Level: 201  - PJP: None;    # Orthostatic Hypotension: Controlled, but low at times on Florinef 0.1mg MWF;  Goal BP: > 100, but < 130 systolic   - Changes: No    # Anemia in Chronic Renal Disease: Hgb: Trend up, now normal      CHARLINE: No   - Iron studies: Replete    # Mineral Bone Disorder:   - Secondary  renal hyperparathyroidism; PTH level: Mildly elevated (151-300 pg/ml)        On treatment: Cinacalcet. Repeat PTH with next labs  - Vitamin D; level: Low        On Supplement: No; Not on treatment due to hypercalcemia  - Calcium; level: Normal        On Supplement: No    # Electrolytes:   - Potassium; level: Normal        On supplement: No  - Magnesium; level: Normal        On supplement: Yes. Recheck Mag with next labs  - Bicarbonate; level: Normal        On supplement: No    # Recurrent UTI: Repeat U/A on 2/10/21 not very concerning for infection but she had lower abdominal pain and UCx with GBS. Treated with amoxicillin x10 days    # Hyperprolactinemia: Normal prolactin level with last check.  Felt secondary to hypothyroidism versus kidney failure, or less likely now a pituitary microadenoma.  Followed by Endocrinology.    # Ureteral stenosis:   -Stent most recently replaced 11/23/20. She will see urology again 2/26/21 at which point they will try to remove again and follow up with U/S. If she continues to have stenosis would need to consider ureteral reimplantation.    # Future plans for pregnancy:   -The patient saw [facility] 12/24/20 and she knows that she would need to switched from MPA to AZA and wait 3 months prior to pregnancy.    -[facility] requests that she has ureteral stenosis dealt with prior to pregnancy.    -They also request ABO and Ab screen. I will message [facility] regarding lab order    # Alopecia: Patient had symptoms early post transplant, which resolved, but now have returned some, although she also feels she has some hair growth.  Likely due to medications, specifically tacrolimus.  Patient isn't interested in changing medications at this time.     # Medical Compliance: Yes     # COVID-19 Virus Review: Discussed COVID-19 virus and the potential medical risks.  Reviewed preventative health recommendations, which includes washing hands for 20 seconds, avoid touching your face, and social distancing.  Asked  patient to inform the transplant center if they are exposed or diagnosed with this virus.    # Transplant History:  Etiology of Kidney Failure: Unknown etiology (no kidney biopsy)  Tx: DDKT  Transplant: 8/3/2019 (Kidney)  Donor Type: Donation after Circulatory Death  Donor Class: Standard Criteria Donor  Crossmatch at time of Tx: negative  DSA at time of Tx: No  Significant changes in immunosuppression: None  Significant transplant-related complications: None    Transplant Office Phone Number: [phone]    Assessment and plan was discussed with the patient and she voiced her understanding and agreement.    Return visit: No follow-ups on file. 6m follow up    Gelacio Mcintyre MD    Chief Complaint   Ms. Wagner is a 30 year old here for kidney transplant and immunosuppression management.     History of Present Illness      The patient currently feels well. She has had some dull lower abdominal pain for which she had a U/A that was not concerning for infection but given her symptoms, stent in place, and UCx with GBS, she was prescribed amoxicillin. The patietn denies N/V. She had an episode of loose stools today with amoxicillin.     The patient continues to have hydronephrosis with removal of stent and has evidence on 11/2020 lasix renal scan to have ureteral stenosis.       Home BP: 100-110/70s.  No lightheadedness.    Review of Systems   A comprehensive review of systems was obtained and negative, except as noted in the HPI or PMH.    Problem List   Patient Active Problem List   Diagnosis     DVT of upper extremity (deep vein thrombosis) (H)     Seizure disorder (H)     Galactorrhea     Acquired hypothyroidism     Increased prolactin level     Hypomagnesemia     Infective endocarditis-history of.  1/2019.  resolved.      Aftercare following organ transplant     Immunosuppression (H)     Methemoglobinemia     Kidney replaced by transplant     Anxiety     Secondary renal hyperparathyroidism (H)     Vitamin D deficiency      CKD (chronic kidney disease) stage 3, GFR 30-59 ml/min     Stricture or kinking of ureter     Pyelonephritis     Diarrhea     Bicornate uterus     Normal Pap 3/2020.  repeat 3/2023     Prophylactic antibiotic     Hydronephrosis     Hydronephrosis of kidney transplant     Ureter, stricture       Social History   Social History     Tobacco Use     Smoking status: Never Smoker     Smokeless tobacco: Never Used   Substance Use Topics     Alcohol use: No     Alcohol/week: 0.0 standard drinks     Drug use: No       Allergies   Allergies   Allergen Reactions     Contrast Dye Rash     CT contrast allergy 12/14/19 rash over eyes. Need to have pre medication before a CT WITH CONTRAST      Lisinopril Swelling     angioedema     Nitrous Oxide Other (See Comments)     Sense of doom     Chlorhexidine Rash     Rash at site     Sulfa Drugs Rash     Muscle stiffness of neck     Dapsone      Methemoglobinemia     Furosemide Other (See Comments)     Skin flushing     Metrogel [Metronidazole]      Hives diffusely on body after vaginal administration.     Azithromycin Dizziness and Rash     Cefuroxime Rash       Medications   Current Outpatient Medications   Medication Sig     acetaminophen (TYLENOL) 325 MG tablet Take 2 tablets (650 mg) by mouth every 4 hours as needed for mild pain     amoxicillin (AMOXIL) 500 MG capsule Take 1 capsule (500 mg) by mouth 2 times daily     aspirin (ASA) 81 MG chewable tablet CHEW AND SWALLOW 1 TABLET DAILY (Patient taking differently: Take 81 mg by mouth every morning )     atorvastatin (LIPITOR) 10 MG tablet Take 1 tablet (10 mg) by mouth daily (Patient taking differently: Take 10 mg by mouth every morning )     cinacalcet (SENSIPAR) 30 MG tablet Take 1 tablet (30 mg) by mouth daily (Patient taking differently: Take 30 mg by mouth every evening )     diphenhydrAMINE (BENADRYL) 25 MG capsule Take 1-2 tablets by mouth every 6 hours PRN itching/allergies (Patient taking differently: Take 25 mg by  mouth as needed Take 1-2 tablets by mouth every 6 hours PRN itching/allergies)     EPINEPHrine (ANY BX GENERIC EQUIV) 0.3 MG/0.3ML injection 2-pack Inject 0.3 mLs (0.3 mg) into the muscle as needed for anaphylaxis     fludrocortisone (FLORINEF) 0.1 MG tablet Take 1 tablet (0.1 mg) by mouth Every Mon, Wed, Fri Morning     hydrOXYzine (ATARAX) 10 MG tablet Take 1 tablet (10 mg) by mouth 3 times daily as needed for anxiety     lactobacillus rhamnosus, GG, (CULTURELL) capsule Take 1 capsule by mouth 2 times daily      levothyroxine (SYNTHROID/LEVOTHROID) 25 MCG tablet Take 1 tablet (25 mcg) Tuesday, Thursday, Saturday and Sunday and 1.5 tablets (37.5 mcg) on Monday, Wednesday and Friday every week. (Patient taking differently: Take 25 mcg by mouth every morning Take 1 tablet (25 mcg) Tuesday, Thursday, Saturday and Sunday and 1.5 tablets (37.5 mcg) on Monday, Wednesday and Friday every week.)     magnesium oxide (MAG-OX) 400 MG tablet Take 1 tablet (400 mg) by mouth every morning     MYFORTIC (BRAND) 180 MG EC tablet Take 3 tablets (540 mg) by mouth 2 times daily     norethindrone (MICRONOR) 0.35 MG tablet Do not restart until your follow up appointment with your surgeon. Discuss restart date at that appointment.     oxyCODONE (ROXICODONE) 5 MG tablet Take 1 tablet (5 mg) by mouth every 6 hours as needed for pain     senna-docusate (SENOKOT-S/PERICOLACE) 8.6-50 MG tablet Take 1-2 tablets by mouth 2 times daily (Patient taking differently: Take 1-2 tablets by mouth 2 times daily as needed )     simethicone (MYLICON) 125 MG chewable tablet Take 1 tablet (125 mg) by mouth 4 times daily as needed for intestinal gas     sodium bicarbonate 650 MG tablet TAKE 3 TABLETS(1950 MG) BY MOUTH TWICE DAILY (Patient taking differently: Take 325 mg by mouth 2 times daily )     tacrolimus (GENERIC EQUIVALENT) 0.5 MG capsule HOLD     tacrolimus (GENERIC EQUIVALENT) 1 MG capsule Take 3 capsules (3 mg) by mouth 2 times daily     No current  facility-administered medications for this visit.      There are no discontinued medications.     Physical Exam  Vital signs were deferred for this telemedicine visit.    GENERAL APPEARANCE: alert and no distress  HENT: no obvious abnormalities on appearance  RESP: breathing appears unremarkable with normal rate, no audible wheezing or cough and no apparent shortness of breath with conversation  MS: extremities normal - no gross deformities noted  SKIN: no apparent rash and normal skin tone  NEURO: speech is clear with no obvious neurological deficits  PSYCH: mentation appears normal and affect normal    Data     Renal Latest Ref Rng & Units 2/2/2021 12/28/2020 12/16/2020   Na 133 - 144 mmol/L 140 138 138   Na (external) 136 - 145 mmol/L - - -   K 3.4 - 5.3 mmol/L 4.1 4.4 4.8   K (external) 3.5 - 5.0 mmol/L - - -   Cl 94 - 109 mmol/L 111(H) 111(H) 109   Cl (external) 98 - 107 mmol/L - - -   CO2 20 - 32 mmol/L 21 20 23   CO2 (external) 22 - 31 mmol/L - - -   BUN 7 - 30 mg/dL 29 26 24   BUN (external) 8 - 22 mg/dL - - -   Cr 0.52 - 1.04 mg/dL 0.93 0.99 1.02   Cr (external) 0.60 - 1 mg/dL - - -   Glucose 70 - 99 mg/dL 93 93 92   Glucose (external) 70 - 125 mg/dL - - -   Ca  8.5 - 10.1 mg/dL 9.8 9.5 9.3   Ca (external) 8.5 - 10.5 mg/dL - - -   Mg 1.6 - 2.3 mg/dL - - -   Mg (external) 1.8 - 2.6 mg/dL - - -     Bone Health Latest Ref Rng & Units 11/3/2020 8/15/2020 8/4/2020   Phos 2.5 - 4.5 mg/dL 2.6 3.2 -   Phos (external) 2.5 - 4.5 mg/dL - - -   PTHi 18 - 80 pg/mL - - 229(H)   PTHi (external) 18 - 80 pg/mL - - -   Vit D Def 20 - 75 ug/L - - 14(L)     Heme Latest Ref Rng & Units 2/2/2021/2021 12/28/2020 12/1/2020   WBC 4.0 - 11.0 10e9/L 6.3 7.2 8.7   WBC (external) 4.0 - 11.0 thou/uL - - -   Hgb 11.7 - 15.7 g/dL 12.7 12.5 12.0   Hgb (external) 12.0 - 16.0 g/dL - - -   Plt 150 - 450 10e9/L 237 245 265   Plt (external) 140 - 440 thou/uL - - -   ABSOLUTE NEUTROPHIL 1.6 - 8.3 10e9/L 4.3 - -   ABSOLUTE NEUTROPHILS (EXTERNAL) 2.0 -  7.7 thou/uL - - -   ABSOLUTE LYMPHOCYTES 0.8 - 5.3 10e9/L 1.2 - -   ABSOLUTE LYMPHOCYTES (EXTERNAL) 0.8 - 4.4 thou/uL - - -   ABSOLUTE MONOCYTES 0.0 - 1.3 10e9/L 0.6 - -   ABSOLUTE MONOCYTES (EXTERNAL) 0.0 - 0.9 thou/uL - - -   ABSOLUTE EOSINOPHILS 0.0 - 0.7 10e9/L 0.1 - -   ABSOLUTE EOSINOPHILS (EXTERNAL) 0.0 - 0.4 thou/uL - - -   ABSOLUTE BASOPHILS 0.0 - 0.2 10e9/L 0.1 - -   ABSOLUTE BASOPHILS (EXTERNAL) 0.0 - 0.2 thou/uL - - -   ABS IMMATURE GRANULOCYTES 0 - 0.4 10e9/L 0.0 - -   ABSOLUTE NUCLEATED RBC - 0.0 - -     Liver Latest Ref Rng & Units 8/4/2020 2/3/2020 1/23/2020   AP 40 - 150 U/L 185(H) 126 210(H)   AP (external) 34 - 104 U/L - - -   TBili 0.2 - 1.3 mg/dL 0.7 0.6 1.2   DBili 0.0 - 0.2 mg/dL <0.1 0.1 -   ALT 0 - 50 U/L 19 20 20   ALT (external) 7 - 52 U/L - - -   AST 0 - 45 U/L 11 13 9   AST (external) 13 - 39 U/L - - -   Tot Protein 6.8 - 8.8 g/dL 7.9 7.4 8.1   Tot Protein (external) 6.4 - 8.9 g/dL - - -   Albumin 3.4 - 5.0 g/dL 4.0 3.7 4.5   Albumin (external) - - - -     Pancreas Latest Ref Rng & Units 8/4/2020 2/3/2020 8/3/2019   A1C 0 - 5.6 % 5.5 5.7(H) 4.8     Iron studies Latest Ref Rng & Units 8/4/2020 2/17/2020 12/3/2019   Iron 35 - 180 ug/dL 64 95 47   Iron sat 15 - 46 % 32 42 23   Ferritin 12 - 150 ng/mL 1,065(H) 1,139(H) 1,003(H)   Ferritin (external) 10 - 291 ng/mL - - -     [facility] Txp Virology Latest Ref Rng & Units 2/2/2021 12/28/2020 12/1/2020   CVM DNA Quant - - - -   CMV QUANT IU/ML CMVND:CMV DNA Not Detected [IU]/mL - - -   LOG IU/ML OF CMVQNT <2.1 [Log:IU]/mL - - -   BK Spec - Plasma, EDTA anticoagulant Plasma Plasma   BK Res BKNEG:BK Virus DNA Not Detected copies/mL BK Virus DNA Not Detected BK Virus DNA Not Detected BK Virus DNA Not Detected   BK Log <2.7 Log copies/mL Not Calculated Not Calculated Not Calculated   EBV VCA IGG ANTIBODY U/mL - - -   EBV CAPSID ANTIBODY IGG 0.0 - 0.8 AI - - -   EBV DNA COPIES/ML EBVNEG:EBV DNA Not Detected [Copies]/mL - - -   EBV DNA LOG OF COPIES <2.7  [Log:copies]/mL - - -   Hep B Surf - - - -        Recent Labs   Lab Test 12/01/20  0909 12/28/20  0910 02/02/21  0920   [facility] [id number] 12/27/2020@2110 458655 21:00   TACROL 5.5 6.6 5.9     Recent Labs   Lab Test 08/16/19  0807 09/03/19  0944   DOSMPA Not Provided 0840pm   MPACID 1.92 3.39   MPAG 149.2* 52.8       Again, thank you for allowing me to participate in the care of your patient.      Sincerely,    Kidney/Pancreas Recipient

## 2021-02-11 NOTE — PROGRESS NOTES
"Mona is a 30 year old who is being evaluated via a billable video visit.      How would you like to obtain your AVS? Mail a copy  If the video visit is dropped, the invitation should be resent by: Send to e-mail at: [email or web address]  Will anyone else be joining your video visit? No  {If patient encounters technical issues they should call [phone] :[id number]}    Video Start Time: {video visit start/end time for provider to select:152948}  Video-Visit Details    Type of service:  Video Visit    Video End Time:{video visit start/end time for provider to select:152948}    Originating Location (pt. Location): {video visit patient location:599821::\"Home\"}    Distant Location (provider location):  [facility] [place]     Platform used for Video Visit: {Virtual Visit Platforms:878634::\"[employer]\"}    "

## 2021-02-11 NOTE — PROGRESS NOTES
Mona is a 30 year old who is being evaluated via a billable video visit.      How would you like to obtain your AVS? [email or web address]  If the video visit is dropped, the invitation should be resent by: Send to e-mail at: [email or web address]  Will anyone else be joining your video visit? No    Video Start Time: 2:41pm  Video-Visit Details    Type of service:  Video Visit    Video End Time:3:00 PM    Originating Location (pt. Location): Home    Distant Location (provider location):  [facility] [place]     Platform used for Video Visit: [employer] Inventory Management System    CHRONIC TRANSPLANT NEPHROLOGY VISIT    Assessment & Plan   # DDKT: Stable kidney function lately. 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 Stent replaced most recently 11/23/20.    - Baseline Cr ~ 1.0-1.2, most recently 0.93   - Proteinuria: Minimal (0.2-0.5 grams)   - Date DSA Last Checked: Aug/2020      Latest DSA: No   - BK Viremia: No   - Kidney Tx Biopsy: Sep 17, 2019; Result: No diagnostic evidence of acute rejection.      # Immunosuppression: Tacrolimus immediate release (goal 4-6) and Mycophenolic acid (dose 540 mg every 12 hours)   - Changes: No    # Infection Prophylaxis:   Last CD4 Level: 201  - PJP: None;    # Orthostatic Hypotension: Controlled, but low at times on Florinef 0.1mg MWF;  Goal BP: > 100, but < 130 systolic   - Changes: No    # Anemia in Chronic Renal Disease: Hgb: Trend up, now normal      CHARLINE: No   - Iron studies: Replete    # Mineral Bone Disorder:   - Secondary renal hyperparathyroidism; PTH level: Mildly elevated (151-300 pg/ml)        On treatment: Cinacalcet. Repeat PTH with next labs  - Vitamin D; level: Low        On Supplement: No; Not on treatment due to hypercalcemia  - Calcium; level: Normal        On Supplement: No    # Electrolytes:   - Potassium; level:  Normal        On supplement: No  - Magnesium; level: Normal        On supplement: Yes. Recheck Mag with next labs  - Bicarbonate; level: Normal        On supplement: No    # Recurrent UTI: Repeat U/A on 2/10/21 not very concerning for infection but she had lower abdominal pain and UCx with GBS. Treated with amoxicillin x10 days    # Hyperprolactinemia: Normal prolactin level with last check.  Felt secondary to hypothyroidism versus kidney failure, or less likely now a pituitary microadenoma.  Followed by Endocrinology.    # Ureteral stenosis:   -Stent most recently replaced 11/23/20. She will see urology again 2/26/21 at which point they will try to remove again and follow up with U/S. If she continues to have stenosis would need to consider ureteral reimplantation.    # Future plans for pregnancy:   -The patient saw [facility] 12/24/20 and she knows that she would need to switched from MPA to AZA and wait 3 months prior to pregnancy.    -[facility] requests that she has ureteral stenosis dealt with prior to pregnancy.    -They also request ABO and Ab screen. I will message [facility] regarding lab order    # Alopecia: Patient had symptoms early post transplant, which resolved, but now have returned some, although she also feels she has some hair growth.  Likely due to medications, specifically tacrolimus.  Patient isn't interested in changing medications at this time.     # Medical Compliance: Yes     # COVID-19 Virus Review: Discussed COVID-19 virus and the potential medical risks.  Reviewed preventative health recommendations, which includes washing hands for 20 seconds, avoid touching your face, and social distancing.  Asked patient to inform the transplant center if they are exposed or diagnosed with this virus.    # Transplant History:  Etiology of Kidney Failure: Unknown etiology (no kidney biopsy)  Tx: DDKT  Transplant: 8/3/2019 (Kidney)  Donor Type: Donation after Circulatory Death  Donor Class: Standard Criteria  Donor  Crossmatch at time of Tx: negative  DSA at time of Tx: No  Significant changes in immunosuppression: None  Significant transplant-related complications: None    Transplant Office Phone Number: [phone]    Assessment and plan was discussed with the patient and she voiced her understanding and agreement.    Return visit: No follow-ups on file. 6m follow up    Gelacio Mcintyre MD    Chief Complaint   Ms. Wagner is a 30 year old here for kidney transplant and immunosuppression management.     History of Present Illness      The patient currently feels well. She has had some dull lower abdominal pain for which she had a U/A that was not concerning for infection but given her symptoms, stent in place, and UCx with GBS, she was prescribed amoxicillin. The patietn denies N/V. She had an episode of loose stools today with amoxicillin.     The patient continues to have hydronephrosis with removal of stent and has evidence on 11/2020 lasix renal scan to have ureteral stenosis.       Home BP: 100-110/70s.  No lightheadedness.    Review of Systems   A comprehensive review of systems was obtained and negative, except as noted in the HPI or PMH.    Problem List   Patient Active Problem List   Diagnosis     DVT of upper extremity (deep vein thrombosis) (H)     Seizure disorder (H)     Galactorrhea     Acquired hypothyroidism     Increased prolactin level     Hypomagnesemia     Infective endocarditis-history of.  1/2019.  resolved.      Aftercare following organ transplant     Immunosuppression (H)     Methemoglobinemia     Kidney replaced by transplant     Anxiety     Secondary renal hyperparathyroidism (H)     Vitamin D deficiency     CKD (chronic kidney disease) stage 3, GFR 30-59 ml/min     Stricture or kinking of ureter     Pyelonephritis     Diarrhea     Bicornate uterus     Normal Pap 3/2020.  repeat 3/2023     Prophylactic antibiotic     Hydronephrosis     Hydronephrosis of kidney transplant     Ureter, stricture        Social History   Social History     Tobacco Use     Smoking status: Never Smoker     Smokeless tobacco: Never Used   Substance Use Topics     Alcohol use: No     Alcohol/week: 0.0 standard drinks     Drug use: No       Allergies   Allergies   Allergen Reactions     Contrast Dye Rash     CT contrast allergy 12/14/19 rash over eyes. Need to have pre medication before a CT WITH CONTRAST      Lisinopril Swelling     angioedema     Nitrous Oxide Other (See Comments)     Sense of doom     Chlorhexidine Rash     Rash at site     Sulfa Drugs Rash     Muscle stiffness of neck     Dapsone      Methemoglobinemia     Furosemide Other (See Comments)     Skin flushing     Metrogel [Metronidazole]      Hives diffusely on body after vaginal administration.     Azithromycin Dizziness and Rash     Cefuroxime Rash       Medications   Current Outpatient Medications   Medication Sig     acetaminophen (TYLENOL) 325 MG tablet Take 2 tablets (650 mg) by mouth every 4 hours as needed for mild pain     amoxicillin (AMOXIL) 500 MG capsule Take 1 capsule (500 mg) by mouth 2 times daily     aspirin (ASA) 81 MG chewable tablet CHEW AND SWALLOW 1 TABLET DAILY (Patient taking differently: Take 81 mg by mouth every morning )     atorvastatin (LIPITOR) 10 MG tablet Take 1 tablet (10 mg) by mouth daily (Patient taking differently: Take 10 mg by mouth every morning )     cinacalcet (SENSIPAR) 30 MG tablet Take 1 tablet (30 mg) by mouth daily (Patient taking differently: Take 30 mg by mouth every evening )     diphenhydrAMINE (BENADRYL) 25 MG capsule Take 1-2 tablets by mouth every 6 hours PRN itching/allergies (Patient taking differently: Take 25 mg by mouth as needed Take 1-2 tablets by mouth every 6 hours PRN itching/allergies)     EPINEPHrine (ANY BX GENERIC EQUIV) 0.3 MG/0.3ML injection 2-pack Inject 0.3 mLs (0.3 mg) into the muscle as needed for anaphylaxis     fludrocortisone (FLORINEF) 0.1 MG tablet Take 1 tablet (0.1 mg) by mouth Every  Mon, Wed, Fri Morning     hydrOXYzine (ATARAX) 10 MG tablet Take 1 tablet (10 mg) by mouth 3 times daily as needed for anxiety     lactobacillus rhamnosus, GG, (CULTURELL) capsule Take 1 capsule by mouth 2 times daily      levothyroxine (SYNTHROID/LEVOTHROID) 25 MCG tablet Take 1 tablet (25 mcg) Tuesday, Thursday, Saturday and Sunday and 1.5 tablets (37.5 mcg) on Monday, Wednesday and Friday every week. (Patient taking differently: Take 25 mcg by mouth every morning Take 1 tablet (25 mcg) Tuesday, Thursday, Saturday and Sunday and 1.5 tablets (37.5 mcg) on Monday, Wednesday and Friday every week.)     magnesium oxide (MAG-OX) 400 MG tablet Take 1 tablet (400 mg) by mouth every morning     MYFORTIC (BRAND) 180 MG EC tablet Take 3 tablets (540 mg) by mouth 2 times daily     norethindrone (MICRONOR) 0.35 MG tablet Do not restart until your follow up appointment with your surgeon. Discuss restart date at that appointment.     oxyCODONE (ROXICODONE) 5 MG tablet Take 1 tablet (5 mg) by mouth every 6 hours as needed for pain     senna-docusate (SENOKOT-S/PERICOLACE) 8.6-50 MG tablet Take 1-2 tablets by mouth 2 times daily (Patient taking differently: Take 1-2 tablets by mouth 2 times daily as needed )     simethicone (MYLICON) 125 MG chewable tablet Take 1 tablet (125 mg) by mouth 4 times daily as needed for intestinal gas     sodium bicarbonate 650 MG tablet TAKE 3 TABLETS(1950 MG) BY MOUTH TWICE DAILY (Patient taking differently: Take 325 mg by mouth 2 times daily )     tacrolimus (GENERIC EQUIVALENT) 0.5 MG capsule HOLD     tacrolimus (GENERIC EQUIVALENT) 1 MG capsule Take 3 capsules (3 mg) by mouth 2 times daily     No current facility-administered medications for this visit.      There are no discontinued medications.     Physical Exam  Vital signs were deferred for this telemedicine visit.    GENERAL APPEARANCE: alert and no distress  HENT: no obvious abnormalities on appearance  RESP: breathing appears unremarkable  with normal rate, no audible wheezing or cough and no apparent shortness of breath with conversation  MS: extremities normal - no gross deformities noted  SKIN: no apparent rash and normal skin tone  NEURO: speech is clear with no obvious neurological deficits  PSYCH: mentation appears normal and affect normal    Data     Renal Latest Ref Rng & Units 2/2/2021 12/28/2020 12/16/2020   Na 133 - 144 mmol/L 140 138 138   Na (external) 136 - 145 mmol/L - - -   K 3.4 - 5.3 mmol/L 4.1 4.4 4.8   K (external) 3.5 - 5.0 mmol/L - - -   Cl 94 - 109 mmol/L 111(H) 111(H) 109   Cl (external) 98 - 107 mmol/L - - -   CO2 20 - 32 mmol/L 21 20 23   CO2 (external) 22 - 31 mmol/L - - -   BUN 7 - 30 mg/dL 29 26 24   BUN (external) 8 - 22 mg/dL - - -   Cr 0.52 - 1.04 mg/dL 0.93 0.99 1.02   Cr (external) 0.60 - 1 mg/dL - - -   Glucose 70 - 99 mg/dL 93 93 92   Glucose (external) 70 - 125 mg/dL - - -   Ca  8.5 - 10.1 mg/dL 9.8 9.5 9.3   Ca (external) 8.5 - 10.5 mg/dL - - -   Mg 1.6 - 2.3 mg/dL - - -   Mg (external) 1.8 - 2.6 mg/dL - - -     Bone Health Latest Ref Rng & Units 11/3/2020 8/15/2020 8/4/2020   Phos 2.5 - 4.5 mg/dL 2.6 3.2 -   Phos (external) 2.5 - 4.5 mg/dL - - -   PTHi 18 - 80 pg/mL - - 229(H)   PTHi (external) 18 - 80 pg/mL - - -   Vit D Def 20 - 75 ug/L - - 14(L)     Heme Latest Ref Rng & Units 2/2/2021 12/28/2020 12/1/2020   WBC 4.0 - 11.0 10e9/L 6.3 7.2 8.7   WBC (external) 4.0 - 11.0 thou/uL - - -   Hgb 11.7 - 15.7 g/dL 12.7 12.5 12.0   Hgb (external) 12.0 - 16.0 g/dL - - -   Plt 150 - 450 10e9/L 237 245 265   Plt (external) 140 - 440 thou/uL - - -   ABSOLUTE NEUTROPHIL 1.6 - 8.3 10e9/L 4.3 - -   ABSOLUTE NEUTROPHILS (EXTERNAL) 2.0 - 7.7 thou/uL - - -   ABSOLUTE LYMPHOCYTES 0.8 - 5.3 10e9/L 1.2 - -   ABSOLUTE LYMPHOCYTES (EXTERNAL) 0.8 - 4.4 thou/uL - - -   ABSOLUTE MONOCYTES 0.0 - 1.3 10e9/L 0.6 - -   ABSOLUTE MONOCYTES (EXTERNAL) 0.0 - 0.9 thou/uL - - -   ABSOLUTE EOSINOPHILS 0.0 - 0.7 10e9/L 0.1 - -   ABSOLUTE EOSINOPHILS  (EXTERNAL) 0.0 - 0.4 thou/uL - - -   ABSOLUTE BASOPHILS 0.0 - 0.2 10e9/L 0.1 - -   ABSOLUTE BASOPHILS (EXTERNAL) 0.0 - 0.2 thou/uL - - -   ABS IMMATURE GRANULOCYTES 0 - 0.4 10e9/L 0.0 - -   ABSOLUTE NUCLEATED RBC - 0.0 - -     Liver Latest Ref Rng & Units 8/4/2020 2/3/2020 1/23/2020   AP 40 - 150 U/L 185(H) 126 210(H)   AP (external) 34 - 104 U/L - - -   TBili 0.2 - 1.3 mg/dL 0.7 0.6 1.2   DBili 0.0 - 0.2 mg/dL <0.1 0.1 -   ALT 0 - 50 U/L 19 20 20   ALT (external) 7 - 52 U/L - - -   AST 0 - 45 U/L 11 13 9   AST (external) 13 - 39 U/L - - -   Tot Protein 6.8 - 8.8 g/dL 7.9 7.4 8.1   Tot Protein (external) 6.4 - 8.9 g/dL - - -   Albumin 3.4 - 5.0 g/dL 4.0 3.7 4.5   Albumin (external) - - - -     Pancreas Latest Ref Rng & Units 8/4/2020 2/3/2020 8/3/2019   A1C 0 - 5.6 % 5.5 5.7(H) 4.8     Iron studies Latest Ref Rng & Units 8/4/2020 2/17/2020 12/3/2019   Iron 35 - 180 ug/dL 64 95 47   Iron sat 15 - 46 % 32 42 23   Ferritin 12 - 150 ng/mL 1,065(H) 1,139(H) 1,003(H)   Ferritin (external) 10 - 291 ng/mL - - -     [facility] Txp Virology Latest Ref Rng & Units 2/2/2021 12/28/2020 12/1/2020   CVM DNA Quant - - - -   CMV QUANT IU/ML CMVND:CMV DNA Not Detected [IU]/mL - - -   LOG IU/ML OF CMVQNT <2.1 [Log:IU]/mL - - -   BK Spec - Plasma, EDTA anticoagulant Plasma Plasma   BK Res BKNEG:BK Virus DNA Not Detected copies/mL BK Virus DNA Not Detected BK Virus DNA Not Detected BK Virus DNA Not Detected   BK Log <2.7 Log copies/mL Not Calculated Not Calculated Not Calculated   EBV VCA IGG ANTIBODY U/mL - - -   EBV CAPSID ANTIBODY IGG 0.0 - 0.8 AI - - -   EBV DNA COPIES/ML EBVNEG:EBV DNA Not Detected [Copies]/mL - - -   EBV DNA LOG OF COPIES <2.7 [Log:copies]/mL - - -   Hep B Surf - - - -        Recent Labs   Lab Test 12/01/20  0909 12/28/20  0910 02/02/21  0920   DOSTAC [id number] 8400 12/27/2020@2110 545020 21:00   TACROL 5.5 6.6 5.9     Recent Labs   Lab Test 08/16/19  0807 09/03/19  0944   DOSMPA Not Provided 0840pm   MPACID 1.92 3.39    MPAG 149.2* 52.8

## 2021-02-12 ENCOUNTER — MEDICAL CORRESPONDENCE (OUTPATIENT)
Dept: HEALTH INFORMATION MANAGEMENT | Facility: CLINIC | Age: 31
End: 2021-02-12

## 2021-02-12 DIAGNOSIS — Z92.89 HISTORY OF BLOOD TRANSFUSION: Primary | ICD-10-CM

## 2021-02-12 DIAGNOSIS — Z87.898: ICD-10-CM

## 2021-02-12 LAB
DONOR IDENTIFICATION: NORMAL
DSA COMMENTS: NORMAL
DSA PRESENT: NO
DSA TEST METHOD: NORMAL
ORGAN: NORMAL
SA1 CELL: NORMAL
SA1 COMMENTS: NORMAL
SA1 HI RISK ABY: NORMAL
SA1 MOD RISK ABY: NORMAL
SA1 TEST METHOD: NORMAL
SA2 CELL: NORMAL
SA2 COMMENTS: NORMAL
SA2 HI RISK ABY UA: NORMAL
SA2 MOD RISK ABY: NORMAL
SA2 TEST METHOD: NORMAL
UNACCEPTABLE ANTIGEN: NORMAL
UNOS CPRA: 25

## 2021-02-16 ENCOUNTER — TELEPHONE (OUTPATIENT)
Dept: TRANSPLANT | Facility: CLINIC | Age: 31
End: 2021-02-16

## 2021-02-16 NOTE — TELEPHONE ENCOUNTER
Adore Burns MD Schindelholz, Alanna, RN             Yes, no abxs if not symptomatic. If symptomatic then amox 875 mg bid x 7 days.   Thanks   Adore    Previous Messages    ----- Message -----   From: Laurence Vazquez RN   Sent: 2/11/2021   9:38 AM CST   To: Adore Burns MD     Good morning!   I just wanted to clarify, as I received both of your messages -   To confirm, you DO NOT want Mona on any abx coverage?     I had talked with our nephrologist and he did order amox, but I can have her HOLD this (as she started yesterday).            OUTCOME:  RNCC spoke with Mona, who has completed just over half of her current amoxicillin Rx (6/10 days).  Odor was different, now resolved. Some discomfort with urination, again, now resolved.   RNCC advised to complete current abx course.   Odor is back back to normal.

## 2021-02-23 ENCOUNTER — PRE VISIT (OUTPATIENT)
Dept: UROLOGY | Facility: CLINIC | Age: 31
End: 2021-02-23

## 2021-02-26 ENCOUNTER — OFFICE VISIT (OUTPATIENT)
Dept: UROLOGY | Facility: CLINIC | Age: 31
End: 2021-02-26
Payer: MEDICARE

## 2021-02-26 ENCOUNTER — MEDICAL CORRESPONDENCE (OUTPATIENT)
Dept: HEALTH INFORMATION MANAGEMENT | Facility: CLINIC | Age: 31
End: 2021-02-26

## 2021-02-26 ENCOUNTER — TRANSFERRED RECORDS (OUTPATIENT)
Dept: HEALTH INFORMATION MANAGEMENT | Facility: CLINIC | Age: 31
End: 2021-02-26

## 2021-02-26 VITALS — DIASTOLIC BLOOD PRESSURE: 67 MMHG | SYSTOLIC BLOOD PRESSURE: 105 MMHG | HEART RATE: 81 BPM

## 2021-02-26 DIAGNOSIS — N13.5 STRICTURE OR KINKING OF URETER: Primary | ICD-10-CM

## 2021-02-26 PROBLEM — N39.0 URINARY TRACT INFECTION: Status: ACTIVE | Noted: 2021-02-26

## 2021-02-26 LAB
ALBUMIN UR-MCNC: 100 MG/DL
ALBUMIN UR-MCNC: 30 MG/DL
APPEARANCE UR: ABNORMAL
APPEARANCE UR: CLEAR
BACTERIA #/AREA URNS HPF: ABNORMAL /HPF
BILIRUB UR QL STRIP: NEGATIVE
BILIRUB UR QL STRIP: NEGATIVE
COLOR UR AUTO: YELLOW
COLOR UR AUTO: YELLOW
GLUCOSE UR STRIP-MCNC: NEGATIVE MG/DL
GLUCOSE UR STRIP-MCNC: NEGATIVE MG/DL
HGB UR QL STRIP: ABNORMAL
HGB UR QL STRIP: ABNORMAL
KETONES UR STRIP-MCNC: NEGATIVE MG/DL
KETONES UR STRIP-MCNC: NEGATIVE MG/DL
LEUKOCYTE ESTERASE UR QL STRIP: ABNORMAL
LEUKOCYTE ESTERASE UR QL STRIP: ABNORMAL
MUCOUS THREADS #/AREA URNS LPF: PRESENT /LPF
NITRATE UR QL: NEGATIVE
NITRATE UR QL: NEGATIVE
PH UR STRIP: 6 PH (ref 5–7)
PH UR STRIP: 6.5 PH (ref 5–7)
RBC #/AREA URNS AUTO: >182 /HPF (ref 0–2)
SOURCE: ABNORMAL
SP GR UR STRIP: 1.01 (ref 1–1.03)
SP GR UR STRIP: 1.02 (ref 1–1.03)
SQUAMOUS #/AREA URNS AUTO: 1 /HPF (ref 0–1)
UROBILINOGEN UR STRIP-ACNC: 0.2 EU/DL (ref 0.2–1)
UROBILINOGEN UR STRIP-MCNC: 0 MG/DL (ref 0–2)
WBC #/AREA URNS AUTO: 8 /HPF (ref 0–5)

## 2021-02-26 PROCEDURE — 81003 URINALYSIS AUTO W/O SCOPE: CPT | Mod: 59 | Performed by: PATHOLOGY

## 2021-02-26 PROCEDURE — 81001 URINALYSIS AUTO W/SCOPE: CPT | Performed by: PATHOLOGY

## 2021-02-26 PROCEDURE — 99213 OFFICE O/P EST LOW 20 MIN: CPT | Performed by: UROLOGY

## 2021-02-26 PROCEDURE — 87088 URINE BACTERIA CULTURE: CPT

## 2021-02-26 PROCEDURE — 87086 URINE CULTURE/COLONY COUNT: CPT

## 2021-02-26 RX ORDER — CLINDAMYCIN PHOSPHATE 20 MG/G
CREAM VAGINAL
COMMUNITY
Start: 2020-12-29 | End: 2022-05-06

## 2021-02-26 ASSESSMENT — PAIN SCALES - GENERAL: PAINLEVEL: MODERATE PAIN (4)

## 2021-02-26 NOTE — PROGRESS NOTES
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 Johnson c/b hydronephrosis, now s/p cystourethroscopy with retrograde pyelography, ureteroscopy and ureteral stent placement on 19. She had another cystoscopy retrograde pyelogram on 20 that showed mild hydronephrosis and no strictures in the transplant ureter.  Lasix Renogram with the stent showed a somewhat improved T1/2 (20) but was otherwise similar to pre-stent level.  20 another stent was placed for hydronephrosis and possible obstruction on Lasix Renogram.      Plan:  - Ureteral stent removal today but this was cancelled due to recent urinary tract infection. Will reschedule for next week.  -urinalysis and urine culture today.    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 Johnson complicated by hydronephrosis s/p cystourethroscopy with retrograde pyelography, ureteroscopy and ureteral stent placement on 2019. Imaging did not show any evidence of filling defects or strictures.    She had another cystoscopy retrograde pyelogram on 20 that showed mild hydronephrosis and no strictures in the transplant ureter.      Eventually another stent was placed 20.        21 renal ultrasound.  I reviewed the radiologic images and report from this radiologic exam.  My independent interpretation is:    No hydronephrosis in transplant kidney    Radiologist Impression  Normal duplex Doppler evaluation of the right lower  quadrant renal transplant. Ureteral stent identified  without  hydronephrosis.          I reviewed the recent radiologic imaging and reports described below.  TRINY Britt  NM RENOGRAM 8/18/2020 3:28 PM      Comparison: CT abdomen 8/15/2020, Renogram 11/13/2019     History: Eval kidney; Other hydronephrosis     Dose: 10 mCi Tc-99m MAG3. Lasix could not be given due to allergy.     Findings: Planar images acquired of the right lower quadrant  transplant kidney during flow phase as well as every 5 minutes over 30  minutes. Additional delayed images were acquired at 60 minutes.     Flow phase demonstrates normal perfusion to the right lower quadrant  transplant kidney with prompt uptake. Time to Tmax has improved to  about 3.5 minutes, previously 6.5 minutes on 11/13/2019. There is  spontaneous excretion by the transplant kidney, although excretion is  prolonged with eventual plateauing of excretion curve. Delayed images  demonstrate further washout of the transplant kidneys. There is  moderate hydronephrosis which persists on delayed images as well as  postvoid images.                                                                      Impression:   1. Normal perfusion of the transplant kidney with prompt uptake.  2. Compared to 11/13/2019, there is now spontaneous excretion by the  transplant kidney, although excretion is prolonged. This excludes  complete obstruction although there may be partial obstruction.  3. Moderate hydronephrosis.3          Nuclear Renal scan without Lasix: 9/22/2020 2:50 PM     Dose: 10 mCi 99M technetium MAG3     Renogram images demonstrate:     Transplant kidney: There is prominent cortical uptake in the right  lower quadrant renal transplant. Renogram curves demonstrate:     Transplant kidney: The radiotracer reaches the peak activity at 3  minutes, previously 3.5 minutes (time to peak). There is normal wash  out prior to Lasix, Lasix was not administered due to patient allergy.  Persistent, decreased hydronephrosis compared to  previous.                                                                      IMPRESSION:  1. Normal perfusion of the transplant kidney with prompt uptake.  2. Grossly similar spontaneous excretion by the transplant kidney,  although prolonged.  3. Decreased hydronephrosis.

## 2021-02-26 NOTE — LETTER
2021       RE: Mona Wagner  [address]  [place] [address]     Dear Colleague,    Thank you for referring your patient, Mona Wagner, to the [facility] [place] [facility]. Please see a copy of my visit note below.      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 Johnson c/b hydronephrosis, now s/p cystourethroscopy with retrograde pyelography, ureteroscopy and ureteral stent placement on 19. She had another cystoscopy retrograde pyelogram on 20 that showed mild hydronephrosis and no strictures in the transplant ureter.  Lasix Renogram with the stent showed a somewhat improved T1/2 (20) but was otherwise similar to pre-stent level.  20 another stent was placed for hydronephrosis and possible obstruction on Lasix Renogram.      Plan:  - Ureteral stent removal today but this was cancelled due to recent urinary tract infection. Will reschedule for next week.  -urinalysis and urine culture today.    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 Johnson complicated by hydronephrosis s/p cystourethroscopy with retrograde pyelography, ureteroscopy and ureteral stent placement on 2019. Imaging did not show any evidence of filling defects or strictures.    She had another cystoscopy retrograde pyelogram on 20 that showed mild hydronephrosis and no strictures in the transplant ureter.      Eventually another stent was placed 20.        21 renal  ultrasound.  I reviewed the radiologic images and report from this radiologic exam.  My independent interpretation is:    No hydronephrosis in transplant kidney    Radiologist Impression  Normal duplex Doppler evaluation of the right lower  quadrant renal transplant. Ureteral stent identified without  hydronephrosis.          I reviewed the recent radiologic imaging and reports described below.  TRINY Britt  NM RENOGRAM 8/18/2020 3:28 PM      Comparison: CT abdomen 8/15/2020, Renogram 11/13/2019     History: Eval kidney; Other hydronephrosis     Dose: 10 mCi Tc-99m MAG3. Lasix could not be given due to allergy.     Findings: Planar images acquired of the right lower quadrant  transplant kidney during flow phase as well as every 5 minutes over 30  minutes. Additional delayed images were acquired at 60 minutes.     Flow phase demonstrates normal perfusion to the right lower quadrant  transplant kidney with prompt uptake. Time to Tmax has improved to  about 3.5 minutes, previously 6.5 minutes on 11/13/2019. There is  spontaneous excretion by the transplant kidney, although excretion is  prolonged with eventual plateauing of excretion curve. Delayed images  demonstrate further washout of the transplant kidneys. There is  moderate hydronephrosis which persists on delayed images as well as  postvoid images.                                                                      Impression:   1. Normal perfusion of the transplant kidney with prompt uptake.  2. Compared to 11/13/2019, there is now spontaneous excretion by the  transplant kidney, although excretion is prolonged. This excludes  complete obstruction although there may be partial obstruction.  3. Moderate hydronephrosis.3          Nuclear Renal scan without Lasix: 9/22/2020 2:50 PM     Dose: 10 mCi 99M technetium MAG3     Renogram images demonstrate:     Transplant kidney: There is prominent cortical uptake in the right  lower quadrant renal transplant.  Renogram curves demonstrate:     Transplant kidney: The radiotracer reaches the peak activity at 3  minutes, previously 3.5 minutes (time to peak). There is normal wash  out prior to Lasix, Lasix was not administered due to patient allergy.  Persistent, decreased hydronephrosis compared to previous.                                                                      IMPRESSION:  1. Normal perfusion of the transplant kidney with prompt uptake.  2. Grossly similar spontaneous excretion by the transplant kidney,  although prolonged.  3. Decreased hydronephrosis.      Wally Britt MD

## 2021-02-26 NOTE — PATIENT INSTRUCTIONS
Urine sent for culture today.    Follow up with Dr. Britt on 3/5 at 830 for cysto/stent removal.    It was a pleasure meeting with you today.  Thank you for allowing me and my team the privilege of caring for you today.  YOU are the reason we are here, and I truly hope we provided you with the excellent service you deserve.  Please let us know if there is anything else we can do for you so that we can be sure you are leaving completely satisfied with your care experience.        Cris Mcginnis, CMA

## 2021-02-27 LAB
BACTERIA SPEC CULT: ABNORMAL
BACTERIA SPEC CULT: ABNORMAL
Lab: ABNORMAL
SPECIMEN SOURCE: ABNORMAL

## 2021-03-01 ENCOUNTER — MYC MEDICAL ADVICE (OUTPATIENT)
Dept: UROLOGY | Facility: CLINIC | Age: 31
End: 2021-03-01

## 2021-03-03 ENCOUNTER — TELEPHONE (OUTPATIENT)
Dept: TRANSPLANT | Facility: CLINIC | Age: 31
End: 2021-03-03

## 2021-03-03 DIAGNOSIS — Z79.899 ENCOUNTER FOR LONG-TERM CURRENT USE OF MEDICATION: ICD-10-CM

## 2021-03-03 DIAGNOSIS — Z94.0 KIDNEY REPLACED BY TRANSPLANT: ICD-10-CM

## 2021-03-03 DIAGNOSIS — N39.0 RECURRENT UTI: ICD-10-CM

## 2021-03-03 DIAGNOSIS — Z94.0 KIDNEY REPLACED BY TRANSPLANT: Primary | ICD-10-CM

## 2021-03-03 DIAGNOSIS — Z48.298 AFTERCARE FOLLOWING ORGAN TRANSPLANT: ICD-10-CM

## 2021-03-03 LAB
ANION GAP SERPL CALCULATED.3IONS-SCNC: 5 MMOL/L (ref 3–14)
BUN SERPL-MCNC: 23 MG/DL (ref 7–30)
CALCIUM SERPL-MCNC: 9.8 MG/DL (ref 8.5–10.1)
CHLORIDE SERPL-SCNC: 107 MMOL/L (ref 94–109)
CO2 SERPL-SCNC: 24 MMOL/L (ref 20–32)
CREAT SERPL-MCNC: 0.92 MG/DL (ref 0.52–1.04)
ERYTHROCYTE [DISTWIDTH] IN BLOOD BY AUTOMATED COUNT: 12.9 % (ref 10–15)
GFR SERPL CREATININE-BSD FRML MDRD: 85 ML/MIN/{1.73_M2}
GLUCOSE SERPL-MCNC: 92 MG/DL (ref 70–99)
HCT VFR BLD AUTO: 41 % (ref 35–47)
HGB BLD-MCNC: 12.6 G/DL (ref 11.7–15.7)
MAGNESIUM SERPL-MCNC: 1.6 MG/DL (ref 1.6–2.3)
MCH RBC QN AUTO: 27.8 PG (ref 26.5–33)
MCHC RBC AUTO-ENTMCNC: 30.7 G/DL (ref 31.5–36.5)
MCV RBC AUTO: 90 FL (ref 78–100)
PLATELET # BLD AUTO: 232 10E9/L (ref 150–450)
POTASSIUM SERPL-SCNC: 4.4 MMOL/L (ref 3.4–5.3)
PTH-INTACT SERPL-MCNC: 251 PG/ML (ref 18–80)
RBC # BLD AUTO: 4.54 10E12/L (ref 3.8–5.2)
SODIUM SERPL-SCNC: 136 MMOL/L (ref 133–144)
TACROLIMUS BLD-MCNC: 6.1 UG/L (ref 5–15)
TME LAST DOSE: NORMAL H
WBC # BLD AUTO: 7.2 10E9/L (ref 4–11)

## 2021-03-03 PROCEDURE — 80048 BASIC METABOLIC PNL TOTAL CA: CPT | Performed by: INTERNAL MEDICINE

## 2021-03-03 PROCEDURE — 85027 COMPLETE CBC AUTOMATED: CPT | Performed by: INTERNAL MEDICINE

## 2021-03-03 PROCEDURE — 83735 ASSAY OF MAGNESIUM: CPT | Performed by: INTERNAL MEDICINE

## 2021-03-03 PROCEDURE — 83970 ASSAY OF PARATHORMONE: CPT | Performed by: INTERNAL MEDICINE

## 2021-03-03 PROCEDURE — 36415 COLL VENOUS BLD VENIPUNCTURE: CPT

## 2021-03-03 PROCEDURE — 87799 DETECT AGENT NOS DNA QUANT: CPT | Performed by: INTERNAL MEDICINE

## 2021-03-03 PROCEDURE — 80197 ASSAY OF TACROLIMUS: CPT | Performed by: INTERNAL MEDICINE

## 2021-03-03 NOTE — TELEPHONE ENCOUNTER
Sent [employer] message to patient regarding:  Mag level 1.6, normal values 1.6 - 2.3.  Continues on mag ox 400mg daily.     PLAN:  Okay to stop PO mag supplement if patient prefers.  Encourage good dietary magnesium intake -   Best sources are dark green vegetables, legumes, cereals, wheat bread, fish, and nuts.

## 2021-03-03 NOTE — TELEPHONE ENCOUNTER
ISSUE:  Mag level 1.6, normal values 1.6 - 2.3.  Continues on mag ox 400mg daily.    PLAN:  Okay to stop PO mag supplement if patient prefers.  Encourage good dietary magnesium intake -   Best sources are dark green vegetables, legumes, cereals, wheat bread, fish, and nuts.

## 2021-03-05 ENCOUNTER — OFFICE VISIT (OUTPATIENT)
Dept: UROLOGY | Facility: CLINIC | Age: 31
End: 2021-03-05
Payer: MEDICARE

## 2021-03-05 VITALS
SYSTOLIC BLOOD PRESSURE: 106 MMHG | WEIGHT: 150 LBS | BODY MASS INDEX: 29.45 KG/M2 | HEART RATE: 75 BPM | HEIGHT: 60 IN | DIASTOLIC BLOOD PRESSURE: 71 MMHG

## 2021-03-05 DIAGNOSIS — N12 PYELONEPHRITIS: ICD-10-CM

## 2021-03-05 DIAGNOSIS — Z53.9 ERRONEOUS ENCOUNTER--DISREGARD: ICD-10-CM

## 2021-03-05 DIAGNOSIS — Z94.0 KIDNEY REPLACED BY TRANSPLANT: Primary | ICD-10-CM

## 2021-03-05 RX ORDER — LIDOCAINE HYDROCHLORIDE 20 MG/ML
10 JELLY TOPICAL ONCE
Status: CANCELLED | OUTPATIENT
Start: 2021-03-05 | End: 2021-03-05

## 2021-03-05 ASSESSMENT — PAIN SCALES - GENERAL
PAINLEVEL: NO PAIN (0)
PAINLEVEL: NO PAIN (0)

## 2021-03-05 ASSESSMENT — MIFFLIN-ST. JEOR: SCORE: 1331.9

## 2021-03-05 NOTE — LETTER
3/5/2021       RE: Mona Wagner  [address]  [place] [address]     Dear Colleague,    Thank you for referring your patient, Mona Wagner, to the [facility] [place] [facility]. Please see a copy of my visit note below.    Chief Complaint   Patient presents with     Cystoscopy     stent removal        Maria Teresa Mak MA    Chief Complaint   Patient presents with     Cystoscopy     stent removal        Blood pressure 106/71, pulse 75, height 1.524 m (5'), weight 68 kg (150 lb), not currently breastfeeding. Body mass index is 29.29 kg/m .    Patient Active Problem List   Diagnosis     DVT of upper extremity (deep vein thrombosis) (H)     Seizure disorder (H)     Galactorrhea     Acquired hypothyroidism     Increased prolactin level     Hypomagnesemia     Infective endocarditis-history of.  1/2019.  resolved.      Aftercare following organ transplant     Immunosuppression (H)     Methemoglobinemia     Kidney replaced by transplant     Anxiety     Secondary renal hyperparathyroidism (H)     Vitamin D deficiency     CKD (chronic kidney disease) stage 3, GFR 30-59 ml/min     Stricture or kinking of ureter     Pyelonephritis     Diarrhea     Bicornate uterus     Normal Pap 3/2020.  repeat 3/2023     Prophylactic antibiotic     Hydronephrosis     Hydronephrosis of kidney transplant     Ureter, stricture     Urinary tract infection       Allergies   Allergen Reactions     Contrast Dye Rash     CT contrast allergy 12/14/19 rash over eyes. Need to have pre medication before a CT WITH CONTRAST      Lisinopril Swelling     angioedema     Nitrous Oxide Other (See Comments)     Sense of doom     Chlorhexidine Rash     Rash at site     Sulfa Drugs Rash     Muscle stiffness of neck     Dapsone      Methemoglobinemia     Furosemide Other (See Comments)     Skin flushing     Metrogel [Metronidazole]      Hives diffusely on body after vaginal  administration.     Azithromycin Dizziness and Rash     Cefuroxime Rash       Current Outpatient Medications   Medication Sig Dispense Refill     acetaminophen (TYLENOL) 325 MG tablet Take 2 tablets (650 mg) by mouth every 4 hours as needed for mild pain 100 tablet 0     amoxicillin (AMOXIL) 500 MG capsule Take 1 capsule (500 mg) by mouth 2 times daily 20 capsule 0     aspirin (ASA) 81 MG chewable tablet Take 1 tablet (81 mg) by mouth every morning 90 tablet 3     atorvastatin (LIPITOR) 10 MG tablet Take 1 tablet (10 mg) by mouth daily (Patient taking differently: Take 10 mg by mouth every morning ) 90 tablet 3     cinacalcet (SENSIPAR) 30 MG tablet Take 1 tablet (30 mg) by mouth daily 30 tablet 3     clindamycin (CLEOCIN) 2 % vaginal cream        diphenhydrAMINE (BENADRYL) 25 MG capsule Take 1-2 tablets by mouth every 6 hours PRN itching/allergies (Patient taking differently: Take 25 mg by mouth as needed Take 1-2 tablets by mouth every 6 hours PRN itching/allergies) 100 capsule 1     EPINEPHrine (ANY BX GENERIC EQUIV) 0.3 MG/0.3ML injection 2-pack Inject 0.3 mLs (0.3 mg) into the muscle as needed for anaphylaxis 2 each 1     fludrocortisone (FLORINEF) 0.1 MG tablet Take 1 tablet (0.1 mg) by mouth Every Mon, Wed, Fri Morning 90 tablet 0     hydrOXYzine (ATARAX) 10 MG tablet Take 1 tablet (10 mg) by mouth 3 times daily as needed for anxiety 20 tablet 0     lactobacillus rhamnosus, GG, (CULTURELL) capsule Take 1 capsule by mouth 2 times daily        levothyroxine (SYNTHROID/LEVOTHROID) 25 MCG tablet Take 1 tablet (25 mcg) Tuesday, Thursday, Saturday and Sunday and 1.5 tablets (37.5 mcg) on Monday, Wednesday and Friday every week. (Patient taking differently: Take 25 mcg by mouth every morning Take 1 tablet (25 mcg) Tuesday, Thursday, Saturday and Sunday and 1.5 tablets (37.5 mcg) on Monday, Wednesday and Friday every week.) 105 tablet 4     magnesium oxide (MAG-OX) 400 MG tablet Take 1 tablet (400 mg) by mouth every  morning 180 tablet 0     MYFORTIC (BRAND) 180 MG EC tablet Take 3 tablets (540 mg) by mouth 2 times daily 180 tablet 11     norethindrone (MICRONOR) 0.35 MG tablet Do not restart until your follow up appointment with your surgeon. Discuss restart date at that appointment. 84 tablet 1     simethicone (MYLICON) 125 MG chewable tablet Take 1 tablet (125 mg) by mouth 4 times daily as needed for intestinal gas 120 tablet 4     sodium bicarbonate 650 MG tablet Take 3 tablets (1,950 mg) by mouth 3 times daily 810 tablet 3     tacrolimus (GENERIC EQUIVALENT) 0.5 MG capsule HOLD 60 capsule 11     tacrolimus (GENERIC EQUIVALENT) 1 MG capsule Take 3 capsules (3 mg) by mouth 2 times daily 180 capsule 11     oxyCODONE (ROXICODONE) 5 MG tablet Take 1 tablet (5 mg) by mouth every 6 hours as needed for pain (Patient not taking: Reported on 3/5/2021) 6 tablet 0     senna-docusate (SENOKOT-S/PERICOLACE) 8.6-50 MG tablet Take 1-2 tablets by mouth 2 times daily (Patient not taking: Reported on 3/5/2021) 30 tablet 0       Social History     Tobacco Use     Smoking status: Never Smoker     Smokeless tobacco: Never Used   Substance Use Topics     Alcohol use: No     Alcohol/week: 0.0 standard drinks     Drug use: No       Invasive Procedure Safety Checklist:    Procedure: Cystoscopy    Action: Complete sections and checkboxes as appropriate.    Pre-procedure:  1. Patient ID Verified with 2 identifiers (Estela and  or MRN) : YES    2. Procedure and site verified with patient/designee (when able) : YES    3. Accurate consent documentation in medical record : YES    4. H&P (or appropriate assessment) documented in medical record : N/A  H&P must be up to 30 days prior to procedure an updated within 24 hours of                 Procedure as applicable.     5. Relevant diagnostic and radiology test results appropriately labeled and displayed as applicable : YES    6. Blood products, implants, devices, and/or special equipment available for the  procedure as applicable : YES    7. Procedure site(s) marked with provider initials [Exclusions: none] : NO    8. Marking not required. Reason : Yes  Procedure does not require site marking    Time Out:     Time-Out performed immediately prior to starting procedure, including verbal and active participation of all team members addressing: YES    1. Correct patient identity.  2. Confirmed that the correct side and site are marked.  3. An accurate procedure to be done.  4. Agreement on the procedure to be done.  5. Correct patient position.  6. Relevant images and results are properly labeled and appropriately displayed.  7. The need to administer antibiotics or fluids for irrigation purposes during the procedure as applicable.  8. Safety precautions based on patient history or medication use.    During Procedure: Verification of correct person, site, and procedure occurs any time the responsibility for care of the patient is transferred to another member of the care team.    The following medication was given:     MEDICATION: Lidocaine Uro-Jet 2% 200mg (20mg/mL)  ROUTE: Urethral   SITE: Urethra   DOSE: 10mL  LOT #: [id number]  : [employer]   EXPIRATION DATE: 10/22  NDC#: [address]-35   Was there drug waste? No    Prior to injection, verified patient identity using patient's name and date of birth.  Due to injection administration, patient instructed to remain in clinic for 15 minutes  afterwards, and to report any adverse reaction to me immediately.    Drug Amount Wasted:  None.  Vial/Syringe: Single dose vial      Maria Teresa Mak CMA  3/5/2021  8:16 AM      This encounter was opened in error. Please disregard.      Again, thank you for allowing me to participate in the care of your patient.      Sincerely,    Wally Britt MD

## 2021-03-05 NOTE — PATIENT INSTRUCTIONS
Please follow up in 1 month with labs and imaging before     It was a pleasure meeting with you today.  Thank you for allowing me and my team the privilege of caring for you today.  YOU are the reason we are here, and I truly hope we provided you with the excellent service you deserve.  Please let us know if there is anything else we can do for you so that we can be sure you are leaving completely satisfied with your care experience.

## 2021-03-05 NOTE — PROGRESS NOTES
Chief Complaint   Patient presents with     Cystoscopy     stent removal        Maria Teresa Mak MA    Chief Complaint   Patient presents with     Cystoscopy     stent removal        Blood pressure 106/71, pulse 75, height 1.524 m (5'), weight 68 kg (150 lb), not currently breastfeeding. Body mass index is 29.29 kg/m .    Patient Active Problem List   Diagnosis     DVT of upper extremity (deep vein thrombosis) (H)     Seizure disorder (H)     Galactorrhea     Acquired hypothyroidism     Increased prolactin level     Hypomagnesemia     Infective endocarditis-history of.  1/2019.  resolved.      Aftercare following organ transplant     Immunosuppression (H)     Methemoglobinemia     Kidney replaced by transplant     Anxiety     Secondary renal hyperparathyroidism (H)     Vitamin D deficiency     CKD (chronic kidney disease) stage 3, GFR 30-59 ml/min     Stricture or kinking of ureter     Pyelonephritis     Diarrhea     Bicornate uterus     Normal Pap 3/2020.  repeat 3/2023     Prophylactic antibiotic     Hydronephrosis     Hydronephrosis of kidney transplant     Ureter, stricture     Urinary tract infection       Allergies   Allergen Reactions     Contrast Dye Rash     CT contrast allergy 12/14/19 rash over eyes. Need to have pre medication before a CT WITH CONTRAST      Lisinopril Swelling     angioedema     Nitrous Oxide Other (See Comments)     Sense of doom     Chlorhexidine Rash     Rash at site     Sulfa Drugs Rash     Muscle stiffness of neck     Dapsone      Methemoglobinemia     Furosemide Other (See Comments)     Skin flushing     Metrogel [Metronidazole]      Hives diffusely on body after vaginal administration.     Azithromycin Dizziness and Rash     Cefuroxime Rash       Current Outpatient Medications   Medication Sig Dispense Refill     acetaminophen (TYLENOL) 325 MG tablet Take 2 tablets (650 mg) by mouth every 4 hours as needed for mild pain 100 tablet 0     amoxicillin (AMOXIL) 500 MG capsule Take 1  capsule (500 mg) by mouth 2 times daily 20 capsule 0     aspirin (ASA) 81 MG chewable tablet Take 1 tablet (81 mg) by mouth every morning 90 tablet 3     atorvastatin (LIPITOR) 10 MG tablet Take 1 tablet (10 mg) by mouth daily (Patient taking differently: Take 10 mg by mouth every morning ) 90 tablet 3     cinacalcet (SENSIPAR) 30 MG tablet Take 1 tablet (30 mg) by mouth daily 30 tablet 3     clindamycin (CLEOCIN) 2 % vaginal cream        diphenhydrAMINE (BENADRYL) 25 MG capsule Take 1-2 tablets by mouth every 6 hours PRN itching/allergies (Patient taking differently: Take 25 mg by mouth as needed Take 1-2 tablets by mouth every 6 hours PRN itching/allergies) 100 capsule 1     EPINEPHrine (ANY BX GENERIC EQUIV) 0.3 MG/0.3ML injection 2-pack Inject 0.3 mLs (0.3 mg) into the muscle as needed for anaphylaxis 2 each 1     fludrocortisone (FLORINEF) 0.1 MG tablet Take 1 tablet (0.1 mg) by mouth Every Mon, Wed, Fri Morning 90 tablet 0     hydrOXYzine (ATARAX) 10 MG tablet Take 1 tablet (10 mg) by mouth 3 times daily as needed for anxiety 20 tablet 0     lactobacillus rhamnosus, GG, (CULTURELL) capsule Take 1 capsule by mouth 2 times daily        levothyroxine (SYNTHROID/LEVOTHROID) 25 MCG tablet Take 1 tablet (25 mcg) Tuesday, Thursday, Saturday and Sunday and 1.5 tablets (37.5 mcg) on Monday, Wednesday and Friday every week. (Patient taking differently: Take 25 mcg by mouth every morning Take 1 tablet (25 mcg) Tuesday, Thursday, Saturday and Sunday and 1.5 tablets (37.5 mcg) on Monday, Wednesday and Friday every week.) 105 tablet 4     magnesium oxide (MAG-OX) 400 MG tablet Take 1 tablet (400 mg) by mouth every morning 180 tablet 0     MYFORTIC (BRAND) 180 MG EC tablet Take 3 tablets (540 mg) by mouth 2 times daily 180 tablet 11     norethindrone (MICRONOR) 0.35 MG tablet Do not restart until your follow up appointment with your surgeon. Discuss restart date at that appointment. 84 tablet 1     simethicone (MYLICON) 125  MG chewable tablet Take 1 tablet (125 mg) by mouth 4 times daily as needed for intestinal gas 120 tablet 4     sodium bicarbonate 650 MG tablet Take 3 tablets (1,950 mg) by mouth 3 times daily 810 tablet 3     tacrolimus (GENERIC EQUIVALENT) 0.5 MG capsule HOLD 60 capsule 11     tacrolimus (GENERIC EQUIVALENT) 1 MG capsule Take 3 capsules (3 mg) by mouth 2 times daily 180 capsule 11     oxyCODONE (ROXICODONE) 5 MG tablet Take 1 tablet (5 mg) by mouth every 6 hours as needed for pain (Patient not taking: Reported on 3/5/2021) 6 tablet 0     senna-docusate (SENOKOT-S/PERICOLACE) 8.6-50 MG tablet Take 1-2 tablets by mouth 2 times daily (Patient not taking: Reported on 3/5/2021) 30 tablet 0       Social History     Tobacco Use     Smoking status: Never Smoker     Smokeless tobacco: Never Used   Substance Use Topics     Alcohol use: No     Alcohol/week: 0.0 standard drinks     Drug use: No       Invasive Procedure Safety Checklist:    Procedure: Cystoscopy    Action: Complete sections and checkboxes as appropriate.    Pre-procedure:  1. Patient ID Verified with 2 identifiers (Estela and  or MRN) : YES    2. Procedure and site verified with patient/designee (when able) : YES    3. Accurate consent documentation in medical record : YES    4. H&P (or appropriate assessment) documented in medical record : N/A  H&P must be up to 30 days prior to procedure an updated within 24 hours of                 Procedure as applicable.     5. Relevant diagnostic and radiology test results appropriately labeled and displayed as applicable : YES    6. Blood products, implants, devices, and/or special equipment available for the procedure as applicable : YES    7. Procedure site(s) marked with provider initials [Exclusions: none] : NO    8. Marking not required. Reason : Yes  Procedure does not require site marking    Time Out:     Time-Out performed immediately prior to starting procedure, including verbal and active participation of all  team members addressing: YES    1. Correct patient identity.  2. Confirmed that the correct side and site are marked.  3. An accurate procedure to be done.  4. Agreement on the procedure to be done.  5. Correct patient position.  6. Relevant images and results are properly labeled and appropriately displayed.  7. The need to administer antibiotics or fluids for irrigation purposes during the procedure as applicable.  8. Safety precautions based on patient history or medication use.    During Procedure: Verification of correct person, site, and procedure occurs any time the responsibility for care of the patient is transferred to another member of the care team.    The following medication was given:     MEDICATION: Lidocaine Uro-Jet 2% 200mg (20mg/mL)  ROUTE: Urethral   SITE: Urethra   DOSE: 10mL  LOT #: [id number]  : [employer]   EXPIRATION DATE: 10/22  NDC#: [address]-27   Was there drug waste? No    Prior to injection, verified patient identity using patient's name and date of birth.  Due to injection administration, patient instructed to remain in clinic for 15 minutes  afterwards, and to report any adverse reaction to me immediately.    Drug Amount Wasted:  None.  Vial/Syringe: Single dose vial      Maria Teresa Mak CMA  3/5/2021  8:16 AM

## 2021-03-05 NOTE — LETTER
Date:April 11, 2021      Provider requested that no letter be sent. Do not send.       [facility]

## 2021-03-10 ENCOUNTER — TELEPHONE (OUTPATIENT)
Dept: UROLOGY | Facility: CLINIC | Age: 31
End: 2021-03-10

## 2021-03-10 DIAGNOSIS — N13.5 STRICTURE OR KINKING OF URETER: Primary | ICD-10-CM

## 2021-03-10 NOTE — TELEPHONE ENCOUNTER
[facility] Call Center    Phone Message    May a detailed message be left on voicemail: yes     Reason for Call: Order(s): Other:   Reason for requested: Pt called and stated Dr. Britt wanted her to have weekly labs until her next appt. She only has 1 lab showing and is wondering if he can add the rest in there so there are no issues. Please contact pt with any questions at [phone]. Thank you.  Date needed: 3/11/2021   Provider name: Dr. Britt      Action Taken: Message routed to:  [facility] ([facility] uro    Travel Screening: Not Applicable

## 2021-03-12 ENCOUNTER — OFFICE VISIT (OUTPATIENT)
Dept: FAMILY MEDICINE | Facility: CLINIC | Age: 31
End: 2021-03-12
Payer: MEDICARE

## 2021-03-12 VITALS
HEART RATE: 82 BPM | BODY MASS INDEX: 29.29 KG/M2 | SYSTOLIC BLOOD PRESSURE: 108 MMHG | OXYGEN SATURATION: 98 % | WEIGHT: 149.2 LBS | RESPIRATION RATE: 22 BRPM | TEMPERATURE: 97.7 F | DIASTOLIC BLOOD PRESSURE: 66 MMHG | HEIGHT: 60 IN

## 2021-03-12 DIAGNOSIS — N18.6 ESRD (END STAGE RENAL DISEASE) ON DIALYSIS (H): ICD-10-CM

## 2021-03-12 DIAGNOSIS — Z99.2 ESRD (END STAGE RENAL DISEASE) ON DIALYSIS (H): ICD-10-CM

## 2021-03-12 DIAGNOSIS — M54.9 BACK PAIN, UNSPECIFIED BACK LOCATION, UNSPECIFIED BACK PAIN LATERALITY, UNSPECIFIED CHRONICITY: ICD-10-CM

## 2021-03-12 PROCEDURE — 99212 OFFICE O/P EST SF 10 MIN: CPT | Performed by: STUDENT IN AN ORGANIZED HEALTH CARE EDUCATION/TRAINING PROGRAM

## 2021-03-12 RX ORDER — CLINDAMYCIN PHOSPHATE 20 MG/G
CREAM VAGINAL
Status: CANCELLED | OUTPATIENT
Start: 2021-03-12

## 2021-03-12 ASSESSMENT — MIFFLIN-ST. JEOR: SCORE: 1328.27

## 2021-03-12 NOTE — PROGRESS NOTES
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  Patient has not had covid 10 vaccination and is at risk for major complications due to COVID 19 infection if this occurred.          PMHX:     Patient Active Problem List   Diagnosis     DVT of upper extremity (deep vein thrombosis) (H)     Seizure disorder (H)     Galactorrhea     Acquired hypothyroidism     Increased prolactin level     Hypomagnesemia     Infective endocarditis-history of.  1/2019.  resolved.      Aftercare following organ transplant     Immunosuppression (H)     Methemoglobinemia     Kidney replaced by transplant     Anxiety     Secondary renal hyperparathyroidism (H)     Vitamin D deficiency     CKD (chronic kidney disease) stage 3, GFR 30-59 ml/min     Stricture or kinking of ureter     Pyelonephritis     Diarrhea     Bicornate uterus     Normal Pap 3/2020.  repeat 3/2023     Prophylactic antibiotic     Hydronephrosis     Hydronephrosis of kidney transplant     Ureter, stricture     Urinary tract infection       Current Outpatient Medications   Medication Sig Dispense Refill     acetaminophen (TYLENOL) 325 MG tablet Take 2 tablets (650 mg) by mouth every 4 hours as needed for mild pain 100 tablet 0     amoxicillin (AMOXIL) 500 MG capsule Take 1 capsule (500 mg) by mouth 2 times daily 20 capsule 0     aspirin (ASA) 81 MG chewable tablet Take 1 tablet (81 mg) by mouth every morning 90 tablet 3     atorvastatin (LIPITOR) 10 MG tablet Take 1 tablet (10 mg) by mouth daily (Patient taking differently: Take 10 mg by mouth every morning ) 90 tablet 3     cinacalcet (SENSIPAR) 30 MG tablet Take 1 tablet (30 mg) by mouth daily 30 tablet 3     clindamycin (CLEOCIN) 2 % vaginal cream        diphenhydrAMINE (BENADRYL) 25 MG capsule Take 1-2 tablets by mouth every 6 hours PRN itching/allergies (Patient  taking differently: Take 25 mg by mouth as needed Take 1-2 tablets by mouth every 6 hours PRN itching/allergies) 100 capsule 1     EPINEPHrine (ANY BX GENERIC EQUIV) 0.3 MG/0.3ML injection 2-pack Inject 0.3 mLs (0.3 mg) into the muscle as needed for anaphylaxis 2 each 1     fludrocortisone (FLORINEF) 0.1 MG tablet Take 1 tablet (0.1 mg) by mouth Every Mon, Wed, Fri Morning 90 tablet 0     hydrOXYzine (ATARAX) 10 MG tablet Take 1 tablet (10 mg) by mouth 3 times daily as needed for anxiety 20 tablet 0     lactobacillus rhamnosus, GG, (CULTURELL) capsule Take 1 capsule by mouth 2 times daily        levothyroxine (SYNTHROID/LEVOTHROID) 25 MCG tablet Take 1 tablet (25 mcg) Tuesday, Thursday, Saturday and Sunday and 1.5 tablets (37.5 mcg) on Monday, Wednesday and Friday every week. (Patient taking differently: Take 25 mcg by mouth every morning Take 1 tablet (25 mcg) Tuesday, Thursday, Saturday and Sunday and 1.5 tablets (37.5 mcg) on Monday, Wednesday and Friday every week.) 105 tablet 4     magnesium oxide (MAG-OX) 400 MG tablet Take 1 tablet (400 mg) by mouth every morning 180 tablet 0     MYFORTIC (BRAND) 180 MG EC tablet Take 3 tablets (540 mg) by mouth 2 times daily 180 tablet 11     norethindrone (MICRONOR) 0.35 MG tablet Do not restart until your follow up appointment with your surgeon. Discuss restart date at that appointment. 84 tablet 1     simethicone (MYLICON) 125 MG chewable tablet Take 1 tablet (125 mg) by mouth 4 times daily as needed for intestinal gas 120 tablet 4     sodium bicarbonate 650 MG tablet Take 3 tablets (1,950 mg) by mouth 3 times daily 810 tablet 3     tacrolimus (GENERIC EQUIVALENT) 0.5 MG capsule HOLD 60 capsule 11     tacrolimus (GENERIC EQUIVALENT) 1 MG capsule Take 3 capsules (3 mg) by mouth 2 times daily 180 capsule 11     oxyCODONE (ROXICODONE) 5 MG tablet Take 1 tablet (5 mg) by mouth every 6 hours as needed for pain (Patient not taking: Reported on 3/5/2021) 6 tablet 0      senna-docusate (SENOKOT-S/PERICOLACE) 8.6-50 MG tablet Take 1-2 tablets by mouth 2 times daily (Patient not taking: Reported on 3/5/2021) 30 tablet 0          Allergies   Allergen Reactions     Contrast Dye Rash     CT contrast allergy 12/14/19 rash over eyes. Need to have pre medication before a CT WITH CONTRAST      Lisinopril Swelling     angioedema     Nitrous Oxide Other (See Comments)     Sense of doom     Chlorhexidine Rash     Rash at site     Sulfa Drugs Rash     Muscle stiffness of neck     Dapsone      Methemoglobinemia     Furosemide Other (See Comments)     Skin flushing     Metrogel [Metronidazole]      Hives diffusely on body after vaginal administration.     Azithromycin Dizziness and Rash     Cefuroxime Rash       No results found. However, due to the size of the patient record, not all encounters were searched. Please check Results Review for a complete set of results.    Current Outpatient Medications   Medication     acetaminophen (TYLENOL) 325 MG tablet     amoxicillin (AMOXIL) 500 MG capsule     aspirin (ASA) 81 MG chewable tablet     atorvastatin (LIPITOR) 10 MG tablet     cinacalcet (SENSIPAR) 30 MG tablet     clindamycin (CLEOCIN) 2 % vaginal cream     diphenhydrAMINE (BENADRYL) 25 MG capsule     EPINEPHrine (ANY BX GENERIC EQUIV) 0.3 MG/0.3ML injection 2-pack     fludrocortisone (FLORINEF) 0.1 MG tablet     hydrOXYzine (ATARAX) 10 MG tablet     lactobacillus rhamnosus, GG, (CULTURELL) capsule     levothyroxine (SYNTHROID/LEVOTHROID) 25 MCG tablet     magnesium oxide (MAG-OX) 400 MG tablet     MYFORTIC (BRAND) 180 MG EC tablet     norethindrone (MICRONOR) 0.35 MG tablet     simethicone (MYLICON) 125 MG chewable tablet     sodium bicarbonate 650 MG tablet     tacrolimus (GENERIC EQUIVALENT) 0.5 MG capsule     tacrolimus (GENERIC EQUIVALENT) 1 MG capsule     oxyCODONE (ROXICODONE) 5 MG tablet     senna-docusate (SENOKOT-S/PERICOLACE) 8.6-50 MG tablet     No current facility-administered  medications for this visit.               Review of Systems:   ROS as described above.            Physical Exam:     Vitals:    03/12/21 0904   BP: 108/66   Pulse: 82   Resp: 22   Temp: 97.7  F (36.5  C)   SpO2: 98%   Weight: 67.7 kg (149 lb 3.2 oz)   Height: 1.524 m (5')     Body mass index is 29.14 kg/m .    GEN: patient sitting comfortably in NAD  HEEN: Head is atraumatic, normocephalic, eyes anicteric, mucous membranes moist  CV: RRR w/o M/R/G  PULM: CTAB without w/r/r  ABD: soft, nontender, bowel sounds present  NEURO: Alert and oriented x3.  No focal motor abnormalities.  Face symmetric.  PSYCH: appropriate  SKIN: No rashes, bruising, or other lesions    No results found for any visits on 03/12/21.    Assessment and Plan     Forms: completed for patient today for continued FMLA due to immune suppressed status.    Desire for COVID 19 vaccination.  Working to arrange this through clinic.     Options for treatment and follow-up care were reviewed with the patient and/or guardian. Mona Wagner and/or guardian engaged in the decision making process and verbalized understanding of the options discussed and agreed with the final plan.    Macarena Bowen MD

## 2021-03-17 DIAGNOSIS — Z94.0 KIDNEY REPLACED BY TRANSPLANT: Primary | ICD-10-CM

## 2021-03-17 DIAGNOSIS — N13.5 STRICTURE OR KINKING OF URETER: ICD-10-CM

## 2021-03-17 LAB
ANION GAP SERPL CALCULATED.3IONS-SCNC: 8 MMOL/L (ref 3–14)
BUN SERPL-MCNC: 22 MG/DL (ref 7–30)
CALCIUM SERPL-MCNC: 9.7 MG/DL (ref 8.5–10.1)
CHLORIDE SERPL-SCNC: 110 MMOL/L (ref 94–109)
CO2 SERPL-SCNC: 22 MMOL/L (ref 20–32)
CREAT SERPL-MCNC: 0.98 MG/DL (ref 0.52–1.04)
GFR SERPL CREATININE-BSD FRML MDRD: 78 ML/MIN/{1.73_M2}
GLUCOSE SERPL-MCNC: 92 MG/DL (ref 70–99)
POTASSIUM SERPL-SCNC: 4 MMOL/L (ref 3.4–5.3)
SODIUM SERPL-SCNC: 139 MMOL/L (ref 133–144)

## 2021-03-17 PROCEDURE — 80048 BASIC METABOLIC PNL TOTAL CA: CPT | Performed by: UROLOGY

## 2021-03-17 PROCEDURE — 36415 COLL VENOUS BLD VENIPUNCTURE: CPT

## 2021-03-20 ENCOUNTER — IMMUNIZATION (OUTPATIENT)
Dept: FAMILY MEDICINE | Facility: CLINIC | Age: 31
End: 2021-03-20
Payer: MEDICARE

## 2021-03-20 PROCEDURE — 91301 PR COVID VAC MODERNA 100 MCG/0.5 ML IM: CPT

## 2021-03-20 PROCEDURE — 0011A PR COVID VAC MODERNA 100 MCG/0.5 ML IM: CPT

## 2021-03-23 DIAGNOSIS — N13.5 STRICTURE OR KINKING OF URETER: ICD-10-CM

## 2021-03-23 DIAGNOSIS — Z94.0 KIDNEY REPLACED BY TRANSPLANT: Primary | ICD-10-CM

## 2021-03-23 LAB
ANION GAP SERPL CALCULATED.3IONS-SCNC: 10 MMOL/L (ref 3–14)
BUN SERPL-MCNC: 25 MG/DL (ref 7–30)
CALCIUM SERPL-MCNC: 9.9 MG/DL (ref 8.5–10.1)
CHLORIDE SERPL-SCNC: 110 MMOL/L (ref 94–109)
CO2 SERPL-SCNC: 20 MMOL/L (ref 20–32)
CREAT SERPL-MCNC: 0.99 MG/DL (ref 0.52–1.04)
GFR SERPL CREATININE-BSD FRML MDRD: 77 ML/MIN/{1.73_M2}
GLUCOSE SERPL-MCNC: 91 MG/DL (ref 70–99)
POTASSIUM SERPL-SCNC: 4.2 MMOL/L (ref 3.4–5.3)
SODIUM SERPL-SCNC: 140 MMOL/L (ref 133–144)

## 2021-03-23 PROCEDURE — 80048 BASIC METABOLIC PNL TOTAL CA: CPT | Performed by: UROLOGY

## 2021-03-23 PROCEDURE — 36415 COLL VENOUS BLD VENIPUNCTURE: CPT

## 2021-03-26 RX ORDER — ACETAMINOPHEN 325 MG/1
650 TABLET ORAL EVERY 4 HOURS PRN
Qty: 100 TABLET | Refills: 0 | Status: SHIPPED | OUTPATIENT
Start: 2021-03-26 | End: 2021-12-23

## 2021-03-26 RX ORDER — DIPHENHYDRAMINE HCL 25 MG
25 CAPSULE ORAL PRN
Qty: 60 CAPSULE | Refills: 0 | Status: SHIPPED | OUTPATIENT
Start: 2021-03-26 | End: 2022-02-11

## 2021-03-29 DIAGNOSIS — R14.0 BLOATED ABDOMEN: ICD-10-CM

## 2021-03-29 RX ORDER — SIMETHICONE 125 MG
125 TABLET,CHEWABLE ORAL 4 TIMES DAILY PRN
Qty: 120 TABLET | Refills: 0 | Status: SHIPPED | OUTPATIENT
Start: 2021-03-29 | End: 2021-12-23

## 2021-03-29 NOTE — TELEPHONE ENCOUNTER
Message to physician:     Date of last visit: 3/12/2021     Date of next visit if scheduled: none    Last Comprehensive Metabolic Panel:  Sodium   Date Value Ref Range Status   03/23/2021 140 133 - 144 mmol/L Final     Potassium   Date Value Ref Range Status   03/23/2021 4.2 3.4 - 5.3 mmol/L Final     Chloride   Date Value Ref Range Status   03/23/2021 110 (H) 94 - 109 mmol/L Final     Carbon Dioxide   Date Value Ref Range Status   03/23/2021 20 20 - 32 mmol/L Final     Anion Gap   Date Value Ref Range Status   03/23/2021 10 3 - 14 mmol/L Final     Glucose   Date Value Ref Range Status   03/23/2021 91 70 - 99 mg/dL Final     Urea Nitrogen   Date Value Ref Range Status   03/23/2021 25 7 - 30 mg/dL Final     Creatinine   Date Value Ref Range Status   03/23/2021 0.99 0.52 - 1.04 mg/dL Final     GFR Estimate   Date Value Ref Range Status   03/23/2021 77 >60 mL/min/[1.73_m2] Final     Comment:     Non  GFR Calc  Starting 12/18/2018, serum creatinine based estimated GFR (eGFR) will be   calculated using the Chronic Kidney Disease Epidemiology Collaboration   (CKD-EPI) equation.       Calcium   Date Value Ref Range Status   03/23/2021 9.9 8.5 - 10.1 mg/dL Final       BP Readings from Last 3 Encounters:   03/12/21 108/66   03/05/21 106/71   02/26/21 105/67       Lab Results   Component Value Date    A1C 5.5 08/04/2020    A1C 5.7 02/03/2020    A1C 4.8 08/03/2019    A1C 4.8 08/02/2019                Please complete refill and CLOSE ENCOUNTER.  Closing the encounter signifies the refill is complete.

## 2021-03-31 DIAGNOSIS — N13.5 STRICTURE OR KINKING OF URETER: Primary | ICD-10-CM

## 2021-03-31 DIAGNOSIS — E03.9 ACQUIRED HYPOTHYROIDISM: ICD-10-CM

## 2021-03-31 DIAGNOSIS — N13.5 STRICTURE OR KINKING OF URETER: ICD-10-CM

## 2021-03-31 LAB
ANION GAP SERPL CALCULATED.3IONS-SCNC: 9 MMOL/L (ref 3–14)
BUN SERPL-MCNC: 24 MG/DL (ref 7–30)
CALCIUM SERPL-MCNC: 9.7 MG/DL (ref 8.5–10.1)
CHLORIDE SERPL-SCNC: 108 MMOL/L (ref 94–109)
CO2 SERPL-SCNC: 23 MMOL/L (ref 20–32)
CREAT SERPL-MCNC: 1 MG/DL (ref 0.52–1.04)
GFR SERPL CREATININE-BSD FRML MDRD: 76 ML/MIN/{1.73_M2}
GLUCOSE SERPL-MCNC: 96 MG/DL (ref 70–99)
POTASSIUM SERPL-SCNC: 4.2 MMOL/L (ref 3.4–5.3)
SODIUM SERPL-SCNC: 139 MMOL/L (ref 133–144)

## 2021-03-31 PROCEDURE — 36415 COLL VENOUS BLD VENIPUNCTURE: CPT

## 2021-03-31 PROCEDURE — 80048 BASIC METABOLIC PNL TOTAL CA: CPT | Performed by: UROLOGY

## 2021-04-01 ENCOUNTER — PRE VISIT (OUTPATIENT)
Dept: UROLOGY | Facility: CLINIC | Age: 31
End: 2021-04-01

## 2021-04-03 NOTE — RESULT ENCOUNTER NOTE
Ms. Kristy,   Enclosed are a copy of your recent laboratory tests.  I have reviewed these results.  The results are as I would expect and I don't think any further laboratory tests are necessary at this time.  There may be other results pending.  If there are, I will send there on to you when they are complete.  You should plan to follow-up as we discussed at your recent visit.  Please let me know if you have any questions.  Thank you for choosing the [facility] for your care.  MARCUS Britt MD.

## 2021-04-04 NOTE — TELEPHONE ENCOUNTER
levothyroxine (SYNTHROID/LEVOTHROID) 25 MCG tablet    Last Written Prescription Date: 4/24/20  Last Fill Quantity: 105,   # refills: 4  Last Office Visit : 4/24/20  Future Office visit: none    Return to clinic in 6 months for follow-up   Scheduling has been notified to contact the pt for appointment.      Routing refill request to provider for review/approval because:  past due for 6 mth appt.  labs due this mth. #qty?

## 2021-04-06 ENCOUNTER — HOSPITAL ENCOUNTER (OUTPATIENT)
Dept: NUCLEAR MEDICINE | Facility: CLINIC | Age: 31
Setting detail: NUCLEAR MEDICINE
End: 2021-04-06
Attending: UROLOGY
Payer: MEDICARE

## 2021-04-06 ENCOUNTER — HOSPITAL ENCOUNTER (OUTPATIENT)
Dept: ULTRASOUND IMAGING | Facility: CLINIC | Age: 31
Discharge: HOME | End: 2021-04-06
Attending: UROLOGY | Admitting: UROLOGY
Payer: MEDICARE

## 2021-04-06 DIAGNOSIS — N12 PYELONEPHRITIS: ICD-10-CM

## 2021-04-06 DIAGNOSIS — Z94.0 KIDNEY REPLACED BY TRANSPLANT: Primary | ICD-10-CM

## 2021-04-06 LAB
ANION GAP SERPL CALCULATED.3IONS-SCNC: 5 MMOL/L (ref 3–14)
BUN SERPL-MCNC: 27 MG/DL (ref 7–30)
CALCIUM SERPL-MCNC: 9.8 MG/DL (ref 8.5–10.1)
CHLORIDE SERPL-SCNC: 110 MMOL/L (ref 94–109)
CO2 SERPL-SCNC: 23 MMOL/L (ref 20–32)
CREAT SERPL-MCNC: 1.09 MG/DL (ref 0.52–1.04)
ERYTHROCYTE [DISTWIDTH] IN BLOOD BY AUTOMATED COUNT: 13.1 % (ref 10–15)
GFR SERPL CREATININE-BSD FRML MDRD: 69 ML/MIN/{1.73_M2}
GLUCOSE SERPL-MCNC: 91 MG/DL (ref 70–99)
HCT VFR BLD AUTO: 39.2 % (ref 35–47)
HGB BLD-MCNC: 12.3 G/DL (ref 11.7–15.7)
MCH RBC QN AUTO: 28.7 PG (ref 26.5–33)
MCHC RBC AUTO-ENTMCNC: 31.4 G/DL (ref 31.5–36.5)
MCV RBC AUTO: 91 FL (ref 78–100)
PLATELET # BLD AUTO: 230 10E9/L (ref 150–450)
POTASSIUM SERPL-SCNC: 4.4 MMOL/L (ref 3.4–5.3)
RBC # BLD AUTO: 4.29 10E12/L (ref 3.8–5.2)
SODIUM SERPL-SCNC: 138 MMOL/L (ref 133–144)
TACROLIMUS BLD-MCNC: 6.9 UG/L (ref 5–15)
TME LAST DOSE: NORMAL H
WBC # BLD AUTO: 6.7 10E9/L (ref 4–11)

## 2021-04-06 PROCEDURE — 78707 K FLOW/FUNCT IMAGE W/O DRUG: CPT | Mod: MG

## 2021-04-06 PROCEDURE — 343N000001 HC RX 343: Performed by: UROLOGY

## 2021-04-06 PROCEDURE — G1004 CDSM NDSC: HCPCS | Performed by: RADIOLOGY

## 2021-04-06 PROCEDURE — 76776 US EXAM K TRANSPL W/DOPPLER: CPT

## 2021-04-06 PROCEDURE — A9562 TC99M MERTIATIDE: HCPCS | Performed by: UROLOGY

## 2021-04-06 PROCEDURE — 85027 COMPLETE CBC AUTOMATED: CPT | Performed by: INTERNAL MEDICINE

## 2021-04-06 PROCEDURE — 80197 ASSAY OF TACROLIMUS: CPT | Performed by: INTERNAL MEDICINE

## 2021-04-06 PROCEDURE — 78707 K FLOW/FUNCT IMAGE W/O DRUG: CPT | Mod: 26 | Performed by: RADIOLOGY

## 2021-04-06 PROCEDURE — 87799 DETECT AGENT NOS DNA QUANT: CPT | Performed by: INTERNAL MEDICINE

## 2021-04-06 PROCEDURE — 76776 US EXAM K TRANSPL W/DOPPLER: CPT | Mod: 26 | Performed by: RADIOLOGY

## 2021-04-06 PROCEDURE — 36415 COLL VENOUS BLD VENIPUNCTURE: CPT

## 2021-04-06 PROCEDURE — 80048 BASIC METABOLIC PNL TOTAL CA: CPT | Performed by: INTERNAL MEDICINE

## 2021-04-06 RX ADMIN — TECHNESCAN TC 99M MERTIATIDE 10.1 MILLICURIE: 1 INJECTION, POWDER, LYOPHILIZED, FOR SOLUTION INTRAVENOUS at 12:53

## 2021-04-09 ENCOUNTER — VIRTUAL VISIT (OUTPATIENT)
Dept: UROLOGY | Facility: CLINIC | Age: 31
End: 2021-04-09
Payer: MEDICARE

## 2021-04-09 DIAGNOSIS — N13.5 STRICTURE OR KINKING OF URETER: Primary | ICD-10-CM

## 2021-04-09 PROCEDURE — 99212 OFFICE O/P EST SF 10 MIN: CPT | Mod: 95 | Performed by: UROLOGY

## 2021-04-09 RX ORDER — LEVOTHYROXINE SODIUM 25 UG/1
TABLET ORAL
Qty: 105 TABLET | Refills: 3 | Status: SHIPPED | OUTPATIENT
Start: 2021-04-09 | End: 2021-05-11

## 2021-04-09 NOTE — LETTER
2021     RE: Mona Wagner  [address]  [place] [address]     Dear Colleague,    Thank you for referring your patient, Mona Wagner, to the [facility] [place] at [facility]. Please see a copy of my visit note below.      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 Johnson c/b hydronephrosis, now s/p cystourethroscopy with retrograde pyelography, ureteroscopy and ureteral stent placement on 19. She had another cystoscopy retrograde pyelogram on 20 that showed mild hydronephrosis and no strictures in the transplant ureter.  Lasix Renogram with the stent showed a somewhat improved T1/2 (20) but was otherwise similar to pre-stent level.  20 another stent was placed for hydronephrosis and possible obstruction on Lasix Renogram. Creatinine 1.0 at that time.    20 cystogram showed transplant ureter reflux    Plan:  - creatinine next week  -renal ultrasound and follow-up video visit in 1 month.  -If creatinine is stable over a few months then she can consider pregnancy.    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 Johnson complicated by hydronephrosis s/p cystourethroscopy with retrograde pyelography, ureteroscopy and ureteral stent placement on 2019. Imaging did not show any evidence of filling defects or strictures.    She had another cystoscopy retrograde pyelogram on 20 that showed mild hydronephrosis and no strictures in the transplant ureter.      Eventually another stent was placed  11/23/20.    4/6/21 Lasix Renogram   I reviewed the radiologic images and report from this radiologic exam.  My independent interpretation is:    Good drainage on transplant kidney    Radiologist Impression  Improved exam since last renogram 11/17/2020. There is prompt uptake  and excretion by the right lower quadrant transplant kidney, with  decreased residual by end of exam. There is mild pelvocaliectasis  without evidence of obstruction.    4/6/21 renal ultrasound   I reviewed the radiologic images and report from this radiologic exam.  My independent interpretation is:    Mild moderate hydronephrosis     Radiologist Impression  1. Interval removal of right lower quadrant renal transplant ureteral  stent with continued moderate hydronephrosis.  2. Normal vascular evaluation.    I reviewed the following laboratory data and went over findings with patient:  Recent Labs   Lab Test 04/06/21  0920 03/03/21  0912 02/02/21  0920 12/28/20  0910   WBC 6.7 7.2 6.3 7.2   HGB 12.3 12.6 12.7 12.5    232 237 245     Recent Labs   Lab Test 04/06/21  0920 03/31/21  0900 03/23/21  0920 03/17/21  0926   CR 1.09* 1.00 0.99 0.98   GFRESTIMATED 69 76 77 78   GFRESTBLACK 80 89 89 >90   GLC 91 96 91 92     Video-Visit Details  Video Start Time: 154  Video End Time: 205  Time spent on pre-visit work, post visit work, and documentation on day of visit outside of time during video visit: 0 min  Originating Location (pt. Location): Home.    Distant Location (provider location):  [facility] UROLOGY AND [facility] FOR PROSTATE AND UROLOGIC CANCERS.    Platform used for Video Visit: Doxy  Type of service:  Video Visit

## 2021-04-09 NOTE — NURSING NOTE
Chief Complaint   Patient presents with     RECHECK     Ureteral stricture follow up.     Cris Mcginnis, CMA

## 2021-04-09 NOTE — PROGRESS NOTES
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 Johnson c/b hydronephrosis, now s/p cystourethroscopy with retrograde pyelography, ureteroscopy and ureteral stent placement on 19. She had another cystoscopy retrograde pyelogram on 20 that showed mild hydronephrosis and no strictures in the transplant ureter.  Lasix Renogram with the stent showed a somewhat improved T1/2 (20) but was otherwise similar to pre-stent level.  20 another stent was placed for hydronephrosis and possible obstruction on Lasix Renogram. Creatinine 1.0 at that time.    20 cystogram showed transplant ureter reflux      Plan:  - creatinine next week  -renal ultrasound and follow-up video visit in 1 month.  -If creatinine is stable over a few months then she can consider pregnancy.    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 Johnson complicated by hydronephrosis s/p cystourethroscopy with retrograde pyelography, ureteroscopy and ureteral stent placement on 2019. Imaging did not show any evidence of filling defects or strictures.    She had another cystoscopy retrograde pyelogram on 20 that showed mild hydronephrosis and no strictures in the transplant ureter.      Eventually another stent was placed 20.    21 Lasix Renogram   I reviewed the radiologic images and report from this radiologic exam.  My independent interpretation is:    Good drainage on transplant kidney    Radiologist Impression  Improved exam since last renogram 2020. There is prompt uptake  and excretion by the right  lower quadrant transplant kidney, with  decreased residual by end of exam. There is mild pelvocaliectasis  without evidence of obstruction.    4/6/21 renal ultrasound   I reviewed the radiologic images and report from this radiologic exam.  My independent interpretation is:    Mild moderate hydronephrosis     Radiologist Impression  1. Interval removal of right lower quadrant renal transplant ureteral  stent with continued moderate hydronephrosis.  2. Normal vascular evaluation.    I reviewed the following laboratory data and went over findings with patient:  Recent Labs   Lab Test 04/06/21  0920 03/03/21  0912 02/02/21  0920 12/28/20  0910   WBC 6.7 7.2 6.3 7.2   HGB 12.3 12.6 12.7 12.5    232 237 245     Recent Labs   Lab Test 04/06/21  0920 03/31/21  0900 03/23/21  0920 03/17/21  0926   CR 1.09* 1.00 0.99 0.98   GFRESTIMATED 69 76 77 78   GFRESTBLACK 80 89 89 >90   GLC 91 96 91 92     Video-Visit Details  Video Start Time: 154  Video End Time: 205  Time spent on pre-visit work, post visit work, and documentation on day of visit outside of time during video visit: 0 min  Originating Location (pt. Location): Home.    Distant Location (provider location):  [facility] UROLOGY AND [facility] FOR PROSTATE AND UROLOGIC CANCERS.    Platform used for Video Visit: Doxy  Type of service:  Video Visit

## 2021-04-13 ENCOUNTER — OFFICE VISIT (OUTPATIENT)
Dept: DERMATOLOGY | Facility: CLINIC | Age: 31
End: 2021-04-13
Payer: MEDICARE

## 2021-04-13 DIAGNOSIS — Z94.0 RENAL TRANSPLANT RECIPIENT: ICD-10-CM

## 2021-04-13 DIAGNOSIS — Z79.899 ENCOUNTER FOR LONG-TERM CURRENT USE OF MEDICATION: ICD-10-CM

## 2021-04-13 DIAGNOSIS — D22.9 MULTIPLE BENIGN MELANOCYTIC NEVI: ICD-10-CM

## 2021-04-13 DIAGNOSIS — D84.9 IMMUNOSUPPRESSED STATUS (H): ICD-10-CM

## 2021-04-13 DIAGNOSIS — L85.8 KP (KERATOSIS PILARIS): Primary | ICD-10-CM

## 2021-04-13 DIAGNOSIS — Z12.83 SCREENING EXAM FOR SKIN CANCER: ICD-10-CM

## 2021-04-13 DIAGNOSIS — N39.0 RECURRENT UTI: ICD-10-CM

## 2021-04-13 DIAGNOSIS — Z94.0 KIDNEY REPLACED BY TRANSPLANT: ICD-10-CM

## 2021-04-13 DIAGNOSIS — Z48.298 AFTERCARE FOLLOWING ORGAN TRANSPLANT: ICD-10-CM

## 2021-04-13 DIAGNOSIS — N13.5 STRICTURE OR KINKING OF URETER: ICD-10-CM

## 2021-04-13 LAB
ANION GAP SERPL CALCULATED.3IONS-SCNC: 5 MMOL/L (ref 3–14)
BUN SERPL-MCNC: 23 MG/DL (ref 7–30)
CALCIUM SERPL-MCNC: 9.3 MG/DL (ref 8.5–10.1)
CHLORIDE SERPL-SCNC: 107 MMOL/L (ref 94–109)
CO2 SERPL-SCNC: 26 MMOL/L (ref 20–32)
CREAT SERPL-MCNC: 1.02 MG/DL (ref 0.52–1.04)
GFR SERPL CREATININE-BSD FRML MDRD: 75 ML/MIN/{1.73_M2}
GLUCOSE SERPL-MCNC: 91 MG/DL (ref 70–99)
POTASSIUM SERPL-SCNC: 4.2 MMOL/L (ref 3.4–5.3)
SODIUM SERPL-SCNC: 138 MMOL/L (ref 133–144)

## 2021-04-13 PROCEDURE — 80048 BASIC METABOLIC PNL TOTAL CA: CPT | Performed by: PATHOLOGY

## 2021-04-13 PROCEDURE — 99203 OFFICE O/P NEW LOW 30 MIN: CPT | Mod: GC | Performed by: DERMATOLOGY

## 2021-04-13 PROCEDURE — 36415 COLL VENOUS BLD VENIPUNCTURE: CPT | Performed by: PATHOLOGY

## 2021-04-13 RX ORDER — AMMONIUM LACTATE 12 G/100G
CREAM TOPICAL
Qty: 140 G | Refills: 11 | Status: SHIPPED | OUTPATIENT
Start: 2021-04-13 | End: 2022-05-06

## 2021-04-13 ASSESSMENT — PAIN SCALES - GENERAL: PAINLEVEL: NO PAIN (0)

## 2021-04-13 NOTE — LETTER
4/13/2021       RE: Mona Wagner  [address]  [place] [address]     Dear Colleague,    Thank you for referring your patient, Mona Wagner, to the [facility] DERMATOLOGY CLINIC [place] at [facility]. Please see a copy of my visit note below.    [facility] Dermatology Note  Encounter Date: Apr 13, 2021  Office Visit     Dermatology Problem List:  1. Keratosis Pilaris  - Amlactin  2. Renal transplant for IgA nephropathy in 8/2019 c/b hydronephrosis.   - currently: myfortic and tacrolimus  - annual skin check. Last: 4/13/21    ____________________________________________    Assessment & Plan:     #1. Keratosis Pilaris  Present since childhood on arms and thighs. Recommended a urea based or amonium lactate based topical.  - start Amlactin BID, ok to obtain OTC    2. Renal transplant for IgA nephropathy in 8/2019 c/b hydronephrosis.   No concerning skin lesions today.  - currently: myfortic and tacrolimus  - annual skin check. Last: 4/13/21    #. Benign melanocytic nevi of the trunk  - reassurance provided; no lesions concerning for malignancy  - photoprotection (regular use of SPF30+ broad spectrum sunscreen and sun protective clothing) recommended  - ABCDE of melanoma discussed      Procedures Performed:   none    Follow-up: 1 year(s) in-person, or earlier for new or changing lesions    Staff and Resident:     Dr Anayeli Todd MD PGY-2  Medicine-Dermatology    Staff Physician Comments:   I saw and evaluated the patient with the resident and I agree with the assessment and plan.  I was present for the examination. I have made edits if needed.    Santos Guadalupe MD  Staff Dermatologist and Dermatopathologist  , Department of Dermatology    ____________________________________________    CC: Skin Check (Kidney transplant. No spots of concern today.)    HPI:  Ms. Mona Wagner is a(n) 30 year old female who  "presents today as a new patient for transplant FBSE.  Renal transplant for IgA nephropathy August 2019 c/b hydronephrosis. On Myfortic and tacrolimus. No concerning spots that she has noticed. She has had \"chicken skin\" since she was a child and is wondering if there is a treatment for the skin roughness. Involves her arms and thighs.   Patient is otherwise feeling well, without additional skin concerns.    Labs Reviewed:  N/A    Physical Exam:  Vitals: There were no vitals taken for this visit.  SKIN: Full skin, which includes the head/face, both arms, chest, back, abdomen,both legs, genitalia and/or groin buttocks, digits and/or nails, was examined.  - Few scattered regular brown pigmented macules and papules are identified on the face, trunk, extremities, palms.   - rough diffuse follicularly based papules on the arms and thighs.  - No other lesions of concern on areas examined.     Medications:  Current Outpatient Medications   Medication     acetaminophen (TYLENOL) 325 MG tablet     aspirin (ASA) 81 MG chewable tablet     atorvastatin (LIPITOR) 10 MG tablet     cinacalcet (SENSIPAR) 30 MG tablet     clindamycin (CLEOCIN) 2 % vaginal cream     diphenhydrAMINE (BENADRYL) 25 MG capsule     EPINEPHrine (ANY BX GENERIC EQUIV) 0.3 MG/0.3ML injection 2-pack     fludrocortisone (FLORINEF) 0.1 MG tablet     hydrOXYzine (ATARAX) 10 MG tablet     lactobacillus rhamnosus, GG, (CULTURELL) capsule     levothyroxine (SYNTHROID/LEVOTHROID) 25 MCG tablet     magnesium oxide (MAG-OX) 400 MG tablet     MYFORTIC (BRAND) 180 MG EC tablet     norethindrone (MICRONOR) 0.35 MG tablet     simethicone (MYLICON) 125 MG chewable tablet     sodium bicarbonate 650 MG tablet     tacrolimus (GENERIC EQUIVALENT) 0.5 MG capsule     tacrolimus (GENERIC EQUIVALENT) 1 MG capsule     amoxicillin (AMOXIL) 500 MG capsule     oxyCODONE (ROXICODONE) 5 MG tablet     senna-docusate (SENOKOT-S/PERICOLACE) 8.6-50 MG tablet     No current " facility-administered medications for this visit.       Past Medical History:   Patient Active Problem List   Diagnosis     DVT of upper extremity (deep vein thrombosis) (H)     Seizure disorder (H)     Galactorrhea     Acquired hypothyroidism     Increased prolactin level     Hypomagnesemia     Infective endocarditis-history of.  1/2019.  resolved.      Aftercare following organ transplant     Immunosuppression (H)     Methemoglobinemia     Kidney replaced by transplant     Anxiety     Secondary renal hyperparathyroidism (H)     Vitamin D deficiency     CKD (chronic kidney disease) stage 3, GFR 30-59 ml/min     Stricture or kinking of ureter     Pyelonephritis     Diarrhea     Bicornate uterus     Normal Pap 3/2020.  repeat 3/2023     Prophylactic antibiotic     Hydronephrosis     Hydronephrosis of kidney transplant     Ureter, stricture     Urinary tract infection     Past Medical History:   Diagnosis Date     Anemia in chronic kidney disease      Dialysis patient (H)      End stage renal disease on dialysis (H)      Endocarditis      History of blood transfusion      History of DVT (deep vein thrombosis) 2014     History of seizure 2014     Hypertension      Hypothyroid      Kidney transplanted 08/03/2019    DCD DDKT. Induction with thymo 6mg/kg.     Pituitary adenoma (H)      Pyelonephritis of transplanted kidney 01/23/2020       CC Referred Self, MD  No address on file on close of this encounter.

## 2021-04-13 NOTE — PROGRESS NOTES
"[facility] Dermatology Note  Encounter Date: Apr 13, 2021  Office Visit     Dermatology Problem List:  1. Keratosis Pilaris  - Amlactin  2. Renal transplant for IgA nephropathy in 8/2019 c/b hydronephrosis.   - currently: myfortic and tacrolimus  - annual skin check. Last: 4/13/21    ____________________________________________    Assessment & Plan:     #1. Keratosis Pilaris  Present since childhood on arms and thighs. Recommended a urea based or amonium lactate based topical.  - start Amlactin BID, ok to obtain OTC    2. Renal transplant for IgA nephropathy in 8/2019 c/b hydronephrosis.   No concerning skin lesions today.  - currently: myfortic and tacrolimus  - annual skin check. Last: 4/13/21    #. Benign melanocytic nevi of the trunk  - reassurance provided; no lesions concerning for malignancy  - photoprotection (regular use of SPF30+ broad spectrum sunscreen and sun protective clothing) recommended  - ABCDE of melanoma discussed      Procedures Performed:   none    Follow-up: 1 year(s) in-person, or earlier for new or changing lesions    Staff and Resident:     Dr Anayeli Tdod MD PGY-2  Medicine-Dermatology    Staff Physician Comments:   I saw and evaluated the patient with the resident and I agree with the assessment and plan.  I was present for the examination. I have made edits if needed.    Santos Guadalupe MD  Staff Dermatologist and Dermatopathologist  , Department of Dermatology    ____________________________________________    CC: Skin Check (Kidney transplant. No spots of concern today.)    HPI:  Ms. Mona Wagner is a(n) 30 year old female who presents today as a new patient for transplant FBSE.  Renal transplant for IgA nephropathy August 2019 c/b hydronephrosis. On Myfortic and tacrolimus. No concerning spots that she has noticed. She has had \"chicken skin\" since she was a child and is wondering if there is a treatment for the skin roughness. " Involves her arms and thighs.   Patient is otherwise feeling well, without additional skin concerns.    Labs Reviewed:  N/A    Physical Exam:  Vitals: There were no vitals taken for this visit.  SKIN: Full skin, which includes the head/face, both arms, chest, back, abdomen,both legs, genitalia and/or groin buttocks, digits and/or nails, was examined.  - Few scattered regular brown pigmented macules and papules are identified on the face, trunk, extremities, palms.   - rough diffuse follicularly based papules on the arms and thighs.  - No other lesions of concern on areas examined.     Medications:  Current Outpatient Medications   Medication     acetaminophen (TYLENOL) 325 MG tablet     aspirin (ASA) 81 MG chewable tablet     atorvastatin (LIPITOR) 10 MG tablet     cinacalcet (SENSIPAR) 30 MG tablet     clindamycin (CLEOCIN) 2 % vaginal cream     diphenhydrAMINE (BENADRYL) 25 MG capsule     EPINEPHrine (ANY BX GENERIC EQUIV) 0.3 MG/0.3ML injection 2-pack     fludrocortisone (FLORINEF) 0.1 MG tablet     hydrOXYzine (ATARAX) 10 MG tablet     lactobacillus rhamnosus, GG, (CULTURELL) capsule     levothyroxine (SYNTHROID/LEVOTHROID) 25 MCG tablet     magnesium oxide (MAG-OX) 400 MG tablet     MYFORTIC (BRAND) 180 MG EC tablet     norethindrone (MICRONOR) 0.35 MG tablet     simethicone (MYLICON) 125 MG chewable tablet     sodium bicarbonate 650 MG tablet     tacrolimus (GENERIC EQUIVALENT) 0.5 MG capsule     tacrolimus (GENERIC EQUIVALENT) 1 MG capsule     amoxicillin (AMOXIL) 500 MG capsule     oxyCODONE (ROXICODONE) 5 MG tablet     senna-docusate (SENOKOT-S/PERICOLACE) 8.6-50 MG tablet     No current facility-administered medications for this visit.       Past Medical History:   Patient Active Problem List   Diagnosis     DVT of upper extremity (deep vein thrombosis) (H)     Seizure disorder (H)     Galactorrhea     Acquired hypothyroidism     Increased prolactin level     Hypomagnesemia     Infective  endocarditis-history of.  1/2019.  resolved.      Aftercare following organ transplant     Immunosuppression (H)     Methemoglobinemia     Kidney replaced by transplant     Anxiety     Secondary renal hyperparathyroidism (H)     Vitamin D deficiency     CKD (chronic kidney disease) stage 3, GFR 30-59 ml/min     Stricture or kinking of ureter     Pyelonephritis     Diarrhea     Bicornate uterus     Normal Pap 3/2020.  repeat 3/2023     Prophylactic antibiotic     Hydronephrosis     Hydronephrosis of kidney transplant     Ureter, stricture     Urinary tract infection     Past Medical History:   Diagnosis Date     Anemia in chronic kidney disease      Dialysis patient (H)      End stage renal disease on dialysis (H)      Endocarditis      History of blood transfusion      History of DVT (deep vein thrombosis) 2014     History of seizure 2014     Hypertension      Hypothyroid      Kidney transplanted 08/03/2019    DCD DDKT. Induction with thymo 6mg/kg.     Pituitary adenoma (H)      Pyelonephritis of transplanted kidney 01/23/2020       CC Referred Self, MD  No address on file on close of this encounter.

## 2021-04-13 NOTE — PATIENT INSTRUCTIONS
1. Bumpy skin aka Keratosis Pilaris.  This is a benign condition. It can be treated with urea based or ammonium lactate base creams (like Amlactin).  - we will prescribe ammonium lactate cream (Amlactin) today. If it is not covered by insurance, you can find this treatment (or the others mentioned above) over the counter.     2. Renal transplant  - annual skin check with dermatology.     Patient Education     Checking for Skin Cancer  You can find cancer early by checking your skin each month. There are 3 kinds of skin cancer. They are melanoma, basal cell carcinoma, and squamous cell carcinoma. Doing monthly skin checks is the best way to find new marks or skin changes. Follow the instructions below for checking your skin.   The ABCDEs of checking moles for melanoma   Check your moles or growths for signs of melanoma using ABCDE:     Asymmetry: the sides of the mole or growth don t match    Border: the edges are ragged, notched, or blurred    Color: the color within the mole or growth varies    Diameter: the mole or growth is larger than 6 mm (size of a pencil eraser)    Evolving: the size, shape, or color of the mole or growth is changing (evolving is not shown in the images below)    Checking for other types of skin cancer  Basal cell carcinoma or squamous cell carcinoma have symptoms such as:       A spot or mole that looks different from all other marks on your skin    Changes in how an area feels, such as itching, tenderness, or pain    Changes in the skin's surface, such as oozing, bleeding, or scaliness    A sore that does not heal    New swelling or redness beyond the border of a mole    Who s at risk?  Anyone can get skin cancer. But you are at greater risk if you have:     Fair skin, light-colored hair, or light-colored eyes    Many moles or abnormal moles on your skin    A history of sunburns from sunlight or tanning beds    A family history of skin cancer    A history of exposure to radiation or  chemicals    A weakened immune system  If you have had skin cancer in the past, you are at risk for recurring skin cancer.   How to check your skin  Do your monthly skin checkups in front of a full-length mirror. Check all parts of your body, including your:     Head (ears, face, neck, and scalp)    Torso (front, back, and sides)    Arms (tops, undersides, upper, and lower armpits)    Hands (palms, backs, and fingers, including under the nails)    Buttocks and genitals    Legs (front, back, and sides)    Feet (tops, soles, toes, including under the nails, and between toes)  If you have a lot of moles, take digital photos of them each month. Make sure to take photos both up close and from a distance. These can help you see if any moles change over time.   Most skin changes are not cancer. But if you see any changes in your skin, call your doctor right away. Only he or she can diagnose a problem. If you have skin cancer, seeing your doctor can be the first step toward getting the treatment that could save your life.   [employer] last reviewed this educational content on 4/1/2019 2000-2020 [employer]. [address] [place] [address]. All rights reserved. This information is not intended as a substitute for professional medical care. Always follow your healthcare professional's instructions.       When should I call my doctor?    If you are worsening or not improving, please, contact us or seek urgent care as noted below.     Who should I call with questions (adults)?    [facility] (adult and pediatric): [phone]     [facility] (adult): [phone]    For urgent needs outside of business hours call the [facility] at [phone] and ask for the dermatology resident on call    If this is a medical emergency and you are unable to reach an ER, Call [phone]      Who should I call with questions (pediatric)?  [facility]  [facility]- Pediatric Dermatology  Dr. Maribel Cabrera, Dr. Mary Barnes, Dr. Madhuri Martinez, Jaquelin Cheema, JONATHON Meléndez, Dr. Melissa Ward & Dr. Seb Orta  Non Urgent  Nurse Triage Line; [phone]- Cristiana and Jaylin RN Care Coordinators   Antoinette (/Complex ) [phone]    If you need a prescription refill, please contact your pharmacy. Refills are approved or denied by our Physicians during normal business hours, Monday through Fridays  Per office policy, refills will not be granted if you have not been seen within the past year (or sooner depending on your child's condition)    Scheduling Information:  Pediatric Appointment Scheduling and Call Center [phone]  Radiology Scheduling- [phone]  Sedation Unit Scheduling- [phone]  [place] Scheduling- [facility] [phone]; Pediatric Dermatology [phone]  Main  Services: [phone]  Romanian: [phone]  South Korean: [phone]  Hmong/Liberian/Florencio: [phone]  Preadmission Nursing Department Fax Number: [phone] (Fax all pre-operative paperwork to this number)    For urgent matters arising during evenings, weekends, or holidays that cannot wait for normal business hours please call [phone] and ask for the Dermatology Resident On-Call to be paged.

## 2021-04-15 NOTE — PATIENT INSTRUCTIONS
Follow up with Dr. Britt in one month with renal ultrasound prior to appointment.    It was a pleasure meeting with you today.  Thank you for allowing me and my team the privilege of caring for you today.  YOU are the reason we are here, and I truly hope we provided you with the excellent service you deserve.  Please let us know if there is anything else we can do for you so that we can be sure you are leaving completely satisfied with your care experience.        Cris Mcginnis, CMA

## 2021-04-17 ENCOUNTER — IMMUNIZATION (OUTPATIENT)
Dept: FAMILY MEDICINE | Facility: CLINIC | Age: 31
End: 2021-04-17
Attending: FAMILY MEDICINE
Payer: MEDICARE

## 2021-04-17 PROCEDURE — 0012A PR COVID VAC MODERNA 100 MCG/0.5 ML IM: CPT

## 2021-04-17 PROCEDURE — 91301 PR COVID VAC MODERNA 100 MCG/0.5 ML IM: CPT

## 2021-04-27 DIAGNOSIS — Z94.0 KIDNEY REPLACED BY TRANSPLANT: Primary | ICD-10-CM

## 2021-04-27 DIAGNOSIS — N12 PYELONEPHRITIS: ICD-10-CM

## 2021-04-27 LAB
ANION GAP SERPL CALCULATED.3IONS-SCNC: 4 MMOL/L (ref 3–14)
BUN SERPL-MCNC: 27 MG/DL (ref 7–30)
CALCIUM SERPL-MCNC: 10 MG/DL (ref 8.5–10.1)
CHLORIDE SERPL-SCNC: 109 MMOL/L (ref 94–109)
CO2 SERPL-SCNC: 25 MMOL/L (ref 20–32)
CREAT SERPL-MCNC: 0.99 MG/DL (ref 0.52–1.04)
GFR SERPL CREATININE-BSD FRML MDRD: 78 ML/MIN/{1.73_M2}
GLUCOSE SERPL-MCNC: 87 MG/DL (ref 70–99)
POTASSIUM SERPL-SCNC: 4.3 MMOL/L (ref 3.4–5.3)
SODIUM SERPL-SCNC: 138 MMOL/L (ref 133–144)

## 2021-04-27 PROCEDURE — 36415 COLL VENOUS BLD VENIPUNCTURE: CPT

## 2021-04-27 PROCEDURE — 80048 BASIC METABOLIC PNL TOTAL CA: CPT | Performed by: UROLOGY

## 2021-05-03 DIAGNOSIS — Z94.0 KIDNEY REPLACED BY TRANSPLANT: Primary | ICD-10-CM

## 2021-05-03 DIAGNOSIS — N39.0 RECURRENT UTI: ICD-10-CM

## 2021-05-03 DIAGNOSIS — Z94.0 KIDNEY REPLACED BY TRANSPLANT: ICD-10-CM

## 2021-05-03 DIAGNOSIS — Z48.298 AFTERCARE FOLLOWING ORGAN TRANSPLANT: ICD-10-CM

## 2021-05-03 DIAGNOSIS — Z79.899 ENCOUNTER FOR LONG-TERM CURRENT USE OF MEDICATION: ICD-10-CM

## 2021-05-03 LAB
ANION GAP SERPL CALCULATED.3IONS-SCNC: 7 MMOL/L (ref 3–14)
BUN SERPL-MCNC: 26 MG/DL (ref 7–30)
CALCIUM SERPL-MCNC: 10 MG/DL (ref 8.5–10.1)
CHLORIDE SERPL-SCNC: 108 MMOL/L (ref 94–109)
CO2 SERPL-SCNC: 23 MMOL/L (ref 20–32)
CREAT SERPL-MCNC: 1 MG/DL (ref 0.52–1.04)
ERYTHROCYTE [DISTWIDTH] IN BLOOD BY AUTOMATED COUNT: 12.9 % (ref 10–15)
GFR SERPL CREATININE-BSD FRML MDRD: 76 ML/MIN/{1.73_M2}
GLUCOSE SERPL-MCNC: 90 MG/DL (ref 70–99)
HCT VFR BLD AUTO: 40.6 % (ref 35–47)
HGB BLD-MCNC: 12.4 G/DL (ref 11.7–15.7)
MCH RBC QN AUTO: 27.7 PG (ref 26.5–33)
MCHC RBC AUTO-ENTMCNC: 30.5 G/DL (ref 31.5–36.5)
MCV RBC AUTO: 91 FL (ref 78–100)
PLATELET # BLD AUTO: 230 10E9/L (ref 150–450)
POTASSIUM SERPL-SCNC: 4.3 MMOL/L (ref 3.4–5.3)
RBC # BLD AUTO: 4.48 10E12/L (ref 3.8–5.2)
SODIUM SERPL-SCNC: 138 MMOL/L (ref 133–144)
TACROLIMUS BLD-MCNC: 5.7 UG/L (ref 5–15)
TME LAST DOSE: NORMAL H
WBC # BLD AUTO: 7.6 10E9/L (ref 4–11)

## 2021-05-03 PROCEDURE — 87799 DETECT AGENT NOS DNA QUANT: CPT | Performed by: INTERNAL MEDICINE

## 2021-05-03 PROCEDURE — 80197 ASSAY OF TACROLIMUS: CPT | Performed by: INTERNAL MEDICINE

## 2021-05-03 PROCEDURE — 85027 COMPLETE CBC AUTOMATED: CPT | Performed by: INTERNAL MEDICINE

## 2021-05-03 PROCEDURE — 36415 COLL VENOUS BLD VENIPUNCTURE: CPT

## 2021-05-03 PROCEDURE — 80048 BASIC METABOLIC PNL TOTAL CA: CPT | Performed by: INTERNAL MEDICINE

## 2021-05-03 NOTE — RESULT ENCOUNTER NOTE
Ms. Kristy,   Enclosed are a copy of your recent laboratory tests.  I have reviewed these results.  The results are as I would expect and I don't think any further laboratory tests are necessary at this time.  There may be other results pending.  If there are, I will send there on to you when they are complete.  You should plan to follow-up as we discussed at your recent visit.  Please let me know if you have any questions.  Thank you for choosing the [facility] for your care.  MARCUS Britt MD.

## 2021-05-10 ENCOUNTER — PRE VISIT (OUTPATIENT)
Dept: UROLOGY | Facility: CLINIC | Age: 31
End: 2021-05-10

## 2021-05-10 ENCOUNTER — ANCILLARY PROCEDURE (OUTPATIENT)
Dept: ULTRASOUND IMAGING | Facility: CLINIC | Age: 31
End: 2021-05-10
Attending: UROLOGY
Payer: MEDICARE

## 2021-05-10 DIAGNOSIS — N13.5 STRICTURE OR KINKING OF URETER: ICD-10-CM

## 2021-05-10 PROCEDURE — 76776 US EXAM K TRANSPL W/DOPPLER: CPT | Performed by: RADIOLOGY

## 2021-05-10 NOTE — PROGRESS NOTES
Outcome for 05/10/21 12:11 PM :Left Voicemail for patient to call back   Mona Wagner  is being evaluated via a billable video visit.      How would you like to obtain your AVS? Qiana  For the video visit, send the invitation by: Qiana  Will anyone else be joining your video visit? No        Endocrinology Follow-up Visit    Mona Wagner is a 30 year old female who is being evaluated via a billable video visit.      Follow-up for: hypothyroidism and hyperprolactinemia       HPI   Mona Wagner is a 30 years old female patient with a past medical history of suspected IgA nephropathy & ESKD s/p kidney transplant, and a previous history of hypothyroidism, hyperprolactinemia and possible pituitary adenoma.    Mona received a kidney transplant in 8/3/19.  With the kidney transplant and treatment of her hypothyroidism, the hyperprolactinemia resolved.    Mona is not working and she has stayed mostly at home during the COVID 19 epidemic.  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      She has been vaccinated for COVID-19 and received her second shot on 4/17/2021, Moderna vaccine.     Galactorrhea: Briefly, her initial galactorrhea improved when Mona started thyroid-replacement therapy in 2014. Prolactin levels remained elevated but stable. MRI w/ contrast in 2017 showed a possible 6.7-5.3mm microadenoma that was not confirmed in the subsequent MRI.  However, due to her kidney dysfunction,the study had to be done without contrast.  I have raised into question whether the patient truly had a prolactin producing microadenoma or her hyperprolactinemia was due to ESkD and/or hypothyroidism. She never saw a neurosurgeon or experienced peripheral vision changes.  Prolactin levels after her kidney transplant have been normal.    Hypothyroidism: Mona is taking levothyroxine regularly.  She is on 25 mcg 4 times a week and 37.5 mcg 3 times a week.  She denies any symptoms suggestive of of hypo-or  hyperthyroidism.    Mona tells me that she has been cleared by her nephrologist to pursue pregnancy and she is waiting for her urologist clearance, as she has had hydronephrosis with need for stenting.        REVIEW OF SYSTEMS  11 point ROS is as detailed in the HPI above, as below or negative      Past Medical History  Past Medical History:   Diagnosis Date     Anemia in chronic kidney disease      Dialysis patient (H)      End stage renal disease on dialysis (H)      Endocarditis      History of blood transfusion      History of DVT (deep vein thrombosis) 2014     History of seizure 2014     Hypertension      Hypothyroid      Kidney transplanted 08/03/2019    DCD DDKT. Induction with thymo 6mg/kg.     Pituitary adenoma (H)      Pyelonephritis of transplanted kidney 01/23/2020     Medications  Current Outpatient Medications   Medication     acetaminophen (TYLENOL) 325 MG tablet     ammonium lactate (AMLACTIN) 12 % external cream     aspirin (ASA) 81 MG chewable tablet     atorvastatin (LIPITOR) 10 MG tablet     cinacalcet (SENSIPAR) 30 MG tablet     clindamycin (CLEOCIN) 2 % vaginal cream     diphenhydrAMINE (BENADRYL) 25 MG capsule     EPINEPHrine (ANY BX GENERIC EQUIV) 0.3 MG/0.3ML injection 2-pack     fludrocortisone (FLORINEF) 0.1 MG tablet     hydrOXYzine (ATARAX) 10 MG tablet     lactobacillus rhamnosus, GG, (CULTURELL) capsule     levothyroxine (SYNTHROID/LEVOTHROID) 25 MCG tablet     magnesium oxide (MAG-OX) 400 MG tablet     MYFORTIC (BRAND) 180 MG EC tablet     norethindrone (MICRONOR) 0.35 MG tablet     simethicone (MYLICON) 125 MG chewable tablet     sodium bicarbonate 650 MG tablet     tacrolimus (GENERIC EQUIVALENT) 0.5 MG capsule     tacrolimus (GENERIC EQUIVALENT) 1 MG capsule     amoxicillin (AMOXIL) 500 MG capsule     oxyCODONE (ROXICODONE) 5 MG tablet     senna-docusate (SENOKOT-S/PERICOLACE) 8.6-50 MG tablet     No current facility-administered medications for this visit.        Current  Outpatient Medications   Medication Sig Dispense Refill     acetaminophen (TYLENOL) 325 MG tablet Take 2 tablets (650 mg) by mouth every 4 hours as needed for mild pain 100 tablet 0     ammonium lactate (AMLACTIN) 12 % external cream Apply twice a day as needed to rough bumpy skin. 140 g 11     aspirin (ASA) 81 MG chewable tablet Take 1 tablet (81 mg) by mouth every morning 90 tablet 3     atorvastatin (LIPITOR) 10 MG tablet Take 1 tablet (10 mg) by mouth daily (Patient taking differently: Take 10 mg by mouth every morning ) 90 tablet 3     cinacalcet (SENSIPAR) 30 MG tablet Take 1 tablet (30 mg) by mouth daily 30 tablet 3     clindamycin (CLEOCIN) 2 % vaginal cream        diphenhydrAMINE (BENADRYL) 25 MG capsule Take 1 capsule (25 mg) by mouth as needed for itching or allergies Take 1-2 tablets by mouth every 6 hours PRN itching/allergies 60 capsule 0     EPINEPHrine (ANY BX GENERIC EQUIV) 0.3 MG/0.3ML injection 2-pack Inject 0.3 mLs (0.3 mg) into the muscle as needed for anaphylaxis 2 each 1     fludrocortisone (FLORINEF) 0.1 MG tablet Take 1 tablet (0.1 mg) by mouth Every Mon, Wed, Fri Morning 90 tablet 0     hydrOXYzine (ATARAX) 10 MG tablet Take 1 tablet (10 mg) by mouth 3 times daily as needed for anxiety 20 tablet 0     lactobacillus rhamnosus, GG, (CULTURELL) capsule Take 1 capsule by mouth 2 times daily        levothyroxine (SYNTHROID/LEVOTHROID) 25 MCG tablet TAKE 1 TABLET(25MCG) BY MOUTH TUES, THURSDAY, SATURDAY AND SUNDAY AND 1 A ND 1/ 2 TABLETS( 37.5 MCG) ON MONDAY WEDESDAY AND FRIDAY EVERY WEEK Call clinic to schedule follow up appointment. 105 tablet 3     magnesium oxide (MAG-OX) 400 MG tablet Take 1 tablet (400 mg) by mouth every morning 180 tablet 0     MYFORTIC (BRAND) 180 MG EC tablet Take 3 tablets (540 mg) by mouth 2 times daily 180 tablet 11     norethindrone (MICRONOR) 0.35 MG tablet Do not restart until your follow up appointment with your surgeon. Discuss restart date at that appointment.  84 tablet 1     simethicone (MYLICON) 125 MG chewable tablet Take 1 tablet (125 mg) by mouth 4 times daily as needed for intestinal gas 120 tablet 0     sodium bicarbonate 650 MG tablet Take 3 tablets (1,950 mg) by mouth 3 times daily 810 tablet 3     tacrolimus (GENERIC EQUIVALENT) 0.5 MG capsule HOLD 60 capsule 11     tacrolimus (GENERIC EQUIVALENT) 1 MG capsule Take 3 capsules (3 mg) by mouth 2 times daily 180 capsule 11     amoxicillin (AMOXIL) 500 MG capsule Take 1 capsule (500 mg) by mouth 2 times daily 20 capsule 0     oxyCODONE (ROXICODONE) 5 MG tablet Take 1 tablet (5 mg) by mouth every 6 hours as needed for pain (Patient not taking: Reported on 3/5/2021) 6 tablet 0     senna-docusate (SENOKOT-S/PERICOLACE) 8.6-50 MG tablet Take 1-2 tablets by mouth 2 times daily (Patient not taking: Reported on 3/5/2021) 30 tablet 0       Allergies  Allergies   Allergen Reactions     Contrast Dye Rash     CT contrast allergy 12/14/19 rash over eyes. Need to have pre medication before a CT WITH CONTRAST      Lisinopril Swelling     angioedema     Nitrous Oxide Other (See Comments)     Sense of doom     Chlorhexidine Rash     Rash at site     Sulfa Drugs Rash     Muscle stiffness of neck     Dapsone      Methemoglobinemia     Furosemide Other (See Comments)     Skin flushing     Metrogel [Metronidazole]      Hives diffusely on body after vaginal administration.     Azithromycin Dizziness and Rash     Cefuroxime Rash         Family History  family history includes Cerebrovascular Disease in her father and sister; Diabetes in her sister; Hypertension in her sister; Kidney Disease in her mother and sister.    Social History  Social History     Tobacco Use     Smoking status: Never Smoker     Smokeless tobacco: Never Used   Substance Use Topics     Alcohol use: No     Alcohol/week: 0.0 standard drinks     Drug use: No       Physical Exam  There were no vitals taken for this visit.  There is no height or weight on file to  calculate BMI.   GENERAL: Healthy, alert and no distress  EYES: Eyes grossly normal to inspection.  No discharge or erythema, or obvious scleral/conjunctival abnormalities.  HENT: Normal cephalic/atraumatic.  External ears, nose and mouth without ulcers or lesions.  No nasal drainage visible.  NECK: No asymmetry, visible masses or scars  RESP: No audible wheeze, cough, or visible cyanosis.  No visible retractions or increased work of breathing.    MS: No gross musculoskeletal defects noted.  Normal range of motion.  No visible edema.  SKIN: Visible skin clear. No significant rash, abnormal pigmentation or lesions.  NEURO: Cranial nerves grossly intact.  Mentation and speech appropriate for age.  PSYCH: Mentation appears normal, affect normal/bright, judgement and insight intact, normal speech and appearance well-groomed.      DATA REVIEW    ASSESSMENT/PLAN:   Katlyn Wagner is a very pleasant 30 years old female patient with a past medical history of suspected IgA nephropathy & ESKD s/p kidney transplant 8/3/19, history of hyperprolactinemia thought to be secondary to hypothyroidism, end-stage kidney disease and possible microadenoma, and hypothyroidism in follow-up of her endocrine issues.     1. Galactorrhea and hyperprolactinemia  Galactorrhea has resolved and prolactin levels have been normal since her kidney transplant.  This suggests that the patient does not have a pituitary adenoma that is secreting prolactin, but that this rather functional hyperprolactinemia, likely related to hypothyroidism and end-stage kidney disease.  - continue to monitor annually  - if prolactin significantly elevated or patient has neurological symptoms, would discuss contrast use with nephrology prior to obtaining a MRI pituitary at that time   -If there is evidence of a pituitary adenoma secreting prolactin, we would consider treatment with cabergoline     2. Hypothyroidism  Clinically euthyroid on current replacement  doses.  -continue levothyroxine 25mcg 4x weekly and 37.5mcg 3x weekly  -If Mona becomes pregnant, she should increase levothyroxine dose to 25 mcg 4x/week and 50 mcg 3x/week, and repeat labs seen 6 to 8 weeks.      Return to clinic in 6 months for follow-up, sooner if the patient becomes pregnant    Orders Placed This Encounter   Procedures     TSH     25 Hydroxyvitamin D2 and D3     Prolactin     TSH     Prolactin     25 Hydroxyvitamin D2 and D3       Patient Instructions   -Please plan for lab work at that time that is convenient for you.  You can call the number below to have this scheduled.  Please send me a MyChart note so that I know when you have the blood work done.  No fasting is necessary.  -Please continue levothyroxine 25mcg 4x weekly and 37.5mcg 3x weekly.  In the event that you become pregnant, levothyroxine dose should be increased to 25 mcg 4x/week and 50 mcg 3x/week.  We would repeat labs 6 to 8 weeks after dose change.  -Please reschedule a follow-up appointment with me about 6 months from now.  Please contact our clinic to plan for a appointment sooner than that if you become pregnant in the next few months.  Your follow-up appointment with me can be either in person or as a video visit.  It was a pleasure to see you today.  Please let me know if you have any other immediate questions or concerns.  Sincerely,    Nidhi Coles MD PhD    Division of Endocrinology and Diabetes          50 minutes spent on the date of the encounter doing chart review, history and exam, documentation and further activities per the note        Nidhi Coles MD PhD    Division of Endocrinology and Diabetes

## 2021-05-11 ENCOUNTER — VIRTUAL VISIT (OUTPATIENT)
Dept: ENDOCRINOLOGY | Facility: CLINIC | Age: 31
End: 2021-05-11
Payer: MEDICARE

## 2021-05-11 DIAGNOSIS — N18.5 CHRONIC KIDNEY DISEASE, STAGE 5 (H): ICD-10-CM

## 2021-05-11 DIAGNOSIS — E03.9 ACQUIRED HYPOTHYROIDISM: ICD-10-CM

## 2021-05-11 DIAGNOSIS — E06.3 HYPOTHYROIDISM DUE TO HASHIMOTO'S THYROIDITIS: Primary | ICD-10-CM

## 2021-05-11 DIAGNOSIS — Z79.890 HORMONE REPLACEMENT THERAPY: ICD-10-CM

## 2021-05-11 PROCEDURE — 99215 OFFICE O/P EST HI 40 MIN: CPT | Mod: 95 | Performed by: INTERNAL MEDICINE

## 2021-05-11 RX ORDER — LEVOTHYROXINE SODIUM 25 UG/1
TABLET ORAL
Qty: 105 TABLET | Refills: 3 | Status: SHIPPED | OUTPATIENT
Start: 2021-05-11 | End: 2022-06-04

## 2021-05-11 NOTE — LETTER
5/11/2021       RE: Mona Wagner  [address]  [place] [address]     Dear Colleague,    Thank you for referring your patient, Mona Wagner, to the [facility] ENDOCRINOLOGY CLINIC [place] at [facility]. Please see a copy of my visit note below.    Outcome for 05/10/21 12:11 PM :Left Voicemail for patient to call back   Mona Wagner  is being evaluated via a billable video visit.      How would you like to obtain your AVS? [email or web address]  For the video visit, send the invitation by: [employer]  Will anyone else be joining your video visit? No        Endocrinology Follow-up Visit    Mona Wagner is a 30 year old female who is being evaluated via a billable video visit.      Follow-up for: hypothyroidism and hyperprolactinemia       HPI   Mona Wagner is a 30 years old female patient with a past medical history of suspected IgA nephropathy & ESKD s/p kidney transplant, and a previous history of hypothyroidism, hyperprolactinemia and possible pituitary adenoma.    Mona received a kidney transplant in 8/3/19.  With the kidney transplant and treatment of her hypothyroidism, the hyperprolactinemia resolved.    Mona is not working and she has stayed mostly at home during the COVID 19 epidemic.  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      She has been vaccinated for COVID-19 and received her second shot on 4/17/2021, Moderna vaccine.     Galactorrhea: Briefly, her initial galactorrhea improved when Mona started thyroid-replacement therapy in 2014. Prolactin levels remained elevated but stable. MRI w/ contrast in 2017 showed a possible 6.7-5.3mm microadenoma that was not confirmed in the subsequent MRI.  However, due to her kidney dysfunction,the study had to be done without contrast.  I have raised into question whether the patient truly had a prolactin producing microadenoma or her hyperprolactinemia was due to ESkD and/or hypothyroidism. She never  saw a neurosurgeon or experienced peripheral vision changes.  Prolactin levels after her kidney transplant have been normal.    Hypothyroidism: Mona is taking levothyroxine regularly.  She is on 25 mcg 4 times a week and 37.5 mcg 3 times a week.  She denies any symptoms suggestive of of hypo-or hyperthyroidism.    Mona tells me that she has been cleared by her nephrologist to pursue pregnancy and she is waiting for her urologist clearance, as she has had hydronephrosis with need for stenting.        REVIEW OF SYSTEMS  11 point ROS is as detailed in the HPI above, as below or negative      Past Medical History  Past Medical History:   Diagnosis Date     Anemia in chronic kidney disease      Dialysis patient (H)      End stage renal disease on dialysis (H)      Endocarditis      History of blood transfusion      History of DVT (deep vein thrombosis) 2014     History of seizure 2014     Hypertension      Hypothyroid      Kidney transplanted 08/03/2019    DCD DDKT. Induction with thymo 6mg/kg.     Pituitary adenoma (H)      Pyelonephritis of transplanted kidney 01/23/2020     Medications  Current Outpatient Medications   Medication     acetaminophen (TYLENOL) 325 MG tablet     ammonium lactate (AMLACTIN) 12 % external cream     aspirin (ASA) 81 MG chewable tablet     atorvastatin (LIPITOR) 10 MG tablet     cinacalcet (SENSIPAR) 30 MG tablet     clindamycin (CLEOCIN) 2 % vaginal cream     diphenhydrAMINE (BENADRYL) 25 MG capsule     EPINEPHrine (ANY BX GENERIC EQUIV) 0.3 MG/0.3ML injection 2-pack     fludrocortisone (FLORINEF) 0.1 MG tablet     hydrOXYzine (ATARAX) 10 MG tablet     lactobacillus rhamnosus, GG, (CULTURELL) capsule     levothyroxine (SYNTHROID/LEVOTHROID) 25 MCG tablet     magnesium oxide (MAG-OX) 400 MG tablet     MYFORTIC (BRAND) 180 MG EC tablet     norethindrone (MICRONOR) 0.35 MG tablet     simethicone (MYLICON) 125 MG chewable tablet     sodium bicarbonate 650 MG tablet     tacrolimus (GENERIC  EQUIVALENT) 0.5 MG capsule     tacrolimus (GENERIC EQUIVALENT) 1 MG capsule     amoxicillin (AMOXIL) 500 MG capsule     oxyCODONE (ROXICODONE) 5 MG tablet     senna-docusate (SENOKOT-S/PERICOLACE) 8.6-50 MG tablet     No current facility-administered medications for this visit.        Current Outpatient Medications   Medication Sig Dispense Refill     acetaminophen (TYLENOL) 325 MG tablet Take 2 tablets (650 mg) by mouth every 4 hours as needed for mild pain 100 tablet 0     ammonium lactate (AMLACTIN) 12 % external cream Apply twice a day as needed to rough bumpy skin. 140 g 11     aspirin (ASA) 81 MG chewable tablet Take 1 tablet (81 mg) by mouth every morning 90 tablet 3     atorvastatin (LIPITOR) 10 MG tablet Take 1 tablet (10 mg) by mouth daily (Patient taking differently: Take 10 mg by mouth every morning ) 90 tablet 3     cinacalcet (SENSIPAR) 30 MG tablet Take 1 tablet (30 mg) by mouth daily 30 tablet 3     clindamycin (CLEOCIN) 2 % vaginal cream        diphenhydrAMINE (BENADRYL) 25 MG capsule Take 1 capsule (25 mg) by mouth as needed for itching or allergies Take 1-2 tablets by mouth every 6 hours PRN itching/allergies 60 capsule 0     EPINEPHrine (ANY BX GENERIC EQUIV) 0.3 MG/0.3ML injection 2-pack Inject 0.3 mLs (0.3 mg) into the muscle as needed for anaphylaxis 2 each 1     fludrocortisone (FLORINEF) 0.1 MG tablet Take 1 tablet (0.1 mg) by mouth Every Mon, Wed, Fri Morning 90 tablet 0     hydrOXYzine (ATARAX) 10 MG tablet Take 1 tablet (10 mg) by mouth 3 times daily as needed for anxiety 20 tablet 0     lactobacillus rhamnosus, GG, (CULTURELL) capsule Take 1 capsule by mouth 2 times daily        levothyroxine (SYNTHROID/LEVOTHROID) 25 MCG tablet TAKE 1 TABLET(25MCG) BY MOUTH TUES, THURSDAY, SATURDAY AND SUNDAY AND 1 A ND 1/ 2 TABLETS( 37.5 MCG) ON MONDAY WEDESDAY AND FRIDAY EVERY WEEK Call clinic to schedule follow up appointment. 105 tablet 3     magnesium oxide (MAG-OX) 400 MG tablet Take 1 tablet (400  mg) by mouth every morning 180 tablet 0     MYFORTIC (BRAND) 180 MG EC tablet Take 3 tablets (540 mg) by mouth 2 times daily 180 tablet 11     norethindrone (MICRONOR) 0.35 MG tablet Do not restart until your follow up appointment with your surgeon. Discuss restart date at that appointment. 84 tablet 1     simethicone (MYLICON) 125 MG chewable tablet Take 1 tablet (125 mg) by mouth 4 times daily as needed for intestinal gas 120 tablet 0     sodium bicarbonate 650 MG tablet Take 3 tablets (1,950 mg) by mouth 3 times daily 810 tablet 3     tacrolimus (GENERIC EQUIVALENT) 0.5 MG capsule HOLD 60 capsule 11     tacrolimus (GENERIC EQUIVALENT) 1 MG capsule Take 3 capsules (3 mg) by mouth 2 times daily 180 capsule 11     amoxicillin (AMOXIL) 500 MG capsule Take 1 capsule (500 mg) by mouth 2 times daily 20 capsule 0     oxyCODONE (ROXICODONE) 5 MG tablet Take 1 tablet (5 mg) by mouth every 6 hours as needed for pain (Patient not taking: Reported on 3/5/2021) 6 tablet 0     senna-docusate (SENOKOT-S/PERICOLACE) 8.6-50 MG tablet Take 1-2 tablets by mouth 2 times daily (Patient not taking: Reported on 3/5/2021) 30 tablet 0       Allergies  Allergies   Allergen Reactions     Contrast Dye Rash     CT contrast allergy 12/14/19 rash over eyes. Need to have pre medication before a CT WITH CONTRAST      Lisinopril Swelling     angioedema     Nitrous Oxide Other (See Comments)     Sense of doom     Chlorhexidine Rash     Rash at site     Sulfa Drugs Rash     Muscle stiffness of neck     Dapsone      Methemoglobinemia     Furosemide Other (See Comments)     Skin flushing     Metrogel [Metronidazole]      Hives diffusely on body after vaginal administration.     Azithromycin Dizziness and Rash     Cefuroxime Rash         Family History  family history includes Cerebrovascular Disease in her father and sister; Diabetes in her sister; Hypertension in her sister; Kidney Disease in her mother and sister.    Social History  Social History      Tobacco Use     Smoking status: Never Smoker     Smokeless tobacco: Never Used   Substance Use Topics     Alcohol use: No     Alcohol/week: 0.0 standard drinks     Drug use: No       Physical Exam  There were no vitals taken for this visit.  There is no height or weight on file to calculate BMI.   GENERAL: Healthy, alert and no distress  EYES: Eyes grossly normal to inspection.  No discharge or erythema, or obvious scleral/conjunctival abnormalities.  HENT: Normal cephalic/atraumatic.  External ears, nose and mouth without ulcers or lesions.  No nasal drainage visible.  NECK: No asymmetry, visible masses or scars  RESP: No audible wheeze, cough, or visible cyanosis.  No visible retractions or increased work of breathing.    MS: No gross musculoskeletal defects noted.  Normal range of motion.  No visible edema.  SKIN: Visible skin clear. No significant rash, abnormal pigmentation or lesions.  NEURO: Cranial nerves grossly intact.  Mentation and speech appropriate for age.  PSYCH: Mentation appears normal, affect normal/bright, judgement and insight intact, normal speech and appearance well-groomed.      DATA REVIEW    ASSESSMENT/PLAN:   Katlyn Wagner is a very pleasant 30 years old female patient with a past medical history of suspected IgA nephropathy & ESKD s/p kidney transplant 8/3/19, history of hyperprolactinemia thought to be secondary to hypothyroidism, end-stage kidney disease and possible microadenoma, and hypothyroidism in follow-up of her endocrine issues.     1. Galactorrhea and hyperprolactinemia  Galactorrhea has resolved and prolactin levels have been normal since her kidney transplant.  This suggests that the patient does not have a pituitary adenoma that is secreting prolactin, but that this rather functional hyperprolactinemia, likely related to hypothyroidism and end-stage kidney disease.  - continue to monitor annually  - if prolactin significantly elevated or patient has neurological symptoms,  would discuss contrast use with nephrology prior to obtaining a MRI pituitary at that time   -If there is evidence of a pituitary adenoma secreting prolactin, we would consider treatment with cabergoline     2. Hypothyroidism  Clinically euthyroid on current replacement doses.  -continue levothyroxine 25mcg 4x weekly and 37.5mcg 3x weekly  -If Mona becomes pregnant, she should increase levothyroxine dose to 25 mcg 4x/week and 50 mcg 3x/week, and repeat labs seen 6 to 8 weeks.      Return to clinic in 6 months for follow-up, sooner if the patient becomes pregnant    Orders Placed This Encounter   Procedures     TSH     25 Hydroxyvitamin D2 and D3     Prolactin     TSH     Prolactin     25 Hydroxyvitamin D2 and D3       Patient Instructions   -Please plan for lab work at that time that is convenient for you.  You can call the number below to have this scheduled.  Please send me a MyChart note so that I know when you have the blood work done.  No fasting is necessary.  -Please continue levothyroxine 25mcg 4x weekly and 37.5mcg 3x weekly.  In the event that you become pregnant, levothyroxine dose should be increased to 25 mcg 4x/week and 50 mcg 3x/week.  We would repeat labs 6 to 8 weeks after dose change.  -Please reschedule a follow-up appointment with me about 6 months from now.  Please contact our clinic to plan for a appointment sooner than that if you become pregnant in the next few months.  Your follow-up appointment with me can be either in person or as a video visit.  It was a pleasure to see you today.  Please let me know if you have any other immediate questions or concerns.  Sincerely,    Nidhi Coles MD PhD    Division of Endocrinology and Diabetes          50 minutes spent on the date of the encounter doing chart review, history and exam, documentation and further activities per the note        Nidhi Coles MD PhD    Division of Endocrinology and Diabetes

## 2021-05-11 NOTE — PATIENT INSTRUCTIONS
-Please plan for lab work at that time that is convenient for you.  You can call the number below to have this scheduled.  Please send me a MyChart note so that I know when you have the blood work done.  No fasting is necessary.  -Please continue levothyroxine 25mcg 4x weekly and 37.5mcg 3x weekly.  In the event that you become pregnant, levothyroxine dose should be increased to 25 mcg 4x/week and 50 mcg 3x/week.  We would repeat labs 6 to 8 weeks after dose change.  -Please reschedule a follow-up appointment with me about 6 months from now.  Please contact our clinic to plan for a appointment sooner than that if you become pregnant in the next few months.  Your follow-up appointment with me can be either in person or as a video visit.  It was a pleasure to see you today.  Please let me know if you have any other immediate questions or concerns.  Sincerely,    Nidhi Coles MD PhD    Division of Endocrinology and Diabetes

## 2021-05-14 ENCOUNTER — VIRTUAL VISIT (OUTPATIENT)
Dept: UROLOGY | Facility: CLINIC | Age: 31
End: 2021-05-14
Payer: MEDICARE

## 2021-05-14 DIAGNOSIS — N13.5 STRICTURE OR KINKING OF URETER: Primary | ICD-10-CM

## 2021-05-14 PROCEDURE — 99213 OFFICE O/P EST LOW 20 MIN: CPT | Mod: 95 | Performed by: UROLOGY

## 2021-05-14 NOTE — PROGRESS NOTES
Mona is a 30 year old who is being evaluated via a billable video visit.  Send text to cell phone [phone]    How would you like to obtain your AVS? MyChart    Will anyone else be joining your video visit? No    Cris Mcginnis, [facility]

## 2021-05-14 NOTE — PROGRESS NOTES
Urology Clinic    Wally Britt MD  Date of Service: 2021     Name: Mona Wagner  MRN: [id number]  Age: 30 year old  : 1990     Assessment:  She has history of ESKD 2/2 IgA nephropathy s/p right kidney transplant on 8/3/19 with Dr. Johnson c/b hydronephrosis, now s/p cystourethroscopy with retrograde pyelography, ureteroscopy and ureteral stent placement on 19. She had another cystoscopy retrograde pyelogram on 20 that showed mild hydronephrosis and no strictures in the transplant ureter.  Lasix Renogram with the stent showed a somewhat improved T1/2 (20) but was otherwise similar to pre-stent level.  20 another stent was placed for hydronephrosis and possible obstruction on Lasix Renogram. Creatinine 1.0 at that time. Stent was subsequently removed.    20 cystogram showed transplant ureter reflux    Recent renal ultrasound shows return of mild hydronephrosis but stable creatinine and no obstruction on Lasix Renogram.    Plan:  - creatinine monthly per transplant  -renal ultrasound in 1 month.  ______________________________________________________________________    HPI  Mona Wagner has a history of ESKD 2/2 IgA nephropathy who is s/p right kidney transplant on 2019 with Dr. Johnson complicated by hydronephrosis s/p cystourethroscopy with retrograde pyelography, ureteroscopy and ureteral stent placement on 2019. Imaging did not show any evidence of filling defects or strictures.    She had another cystoscopy retrograde pyelogram on 20 that showed mild hydronephrosis and no strictures in the transplant ureter.      Eventually another stent was placed 20.    21  She denies any pain or recent infections.    5/10/21 renal ultrasound   I reviewed the radiologic images and report from this radiologic exam.  My independent interpretation is:    Mild to moderate hydronephrosis in the transplant kidney    Radiologist Impression  Stable moderate  hydronephrosis in the transplant kidney, unchanged  after voiding.      I reviewed the following laboratory data and went over findings with patient:  Recent Labs   Lab Test 04/06/21  0920 03/03/21  0912 02/02/21  0920 12/28/20  0910   WBC 6.7 7.2 6.3 7.2   HGB 12.3 12.6 12.7 12.5    232 237 245     Recent Labs   Lab Test 04/06/21  0920 03/31/21  0900 03/23/21  0920 03/17/21  0926   CR 1.09* 1.00 0.99 0.98   GFRESTIMATED 69 76 77 78   GFRESTBLACK 80 89 89 >90   GLC 91 96 91 92     Video-Visit Details  Video Start Time: 1044  Video End Time: 1055  Time spent on pre-visit work, post visit work, and documentation on day of visit outside of time during video visit: 5 min  Total time: 16 min  Originating Location (pt. Location): Home.    Distant Location (provider location):  [facility] UROLOGY AND [facility] FOR PROSTATE AND UROLOGIC CANCERS.    Platform used for Video Visit: Doxy  Type of service:  Video Visit

## 2021-05-14 NOTE — PATIENT INSTRUCTIONS
-creatinine monthly per transplant    -renal ultrasound and follow-up (video visit) with Dr. Britt in 1 month.    -If creatinine is stable over a few months then she can consider pregnancy.    It was a pleasure meeting with you today.  Thank you for allowing me and my team the privilege of caring for you today.  YOU are the reason we are here, and I truly hope we provided you with the excellent service you deserve.  Please let us know if there is anything else we can do for you so that we can be sure you are leaving completely satisfied with your care experience.        Cris Mcginnis, CMA

## 2021-05-14 NOTE — LETTER
2021       RE: Mona Wagner  [address]  [place] [address]     Dear Colleague,    Thank you for referring your patient, Mona Wagner, to the [facility] UROLOGY CLINIC [place] at [facility]. Please see a copy of my visit note below.      Urology Clinic    Wally Britt MD  Date of Service: 2021     Name: Mona Wagner  MRN: [id number]  Age: 30 year old  : 1990     Assessment:  She has history of ESKD 2/2 IgA nephropathy s/p right kidney transplant on 8/3/19 with Dr. Johnson c/b hydronephrosis, now s/p cystourethroscopy with retrograde pyelography, ureteroscopy and ureteral stent placement on 19. She had another cystoscopy retrograde pyelogram on 20 that showed mild hydronephrosis and no strictures in the transplant ureter.  Lasix Renogram with the stent showed a somewhat improved T1/2 (20) but was otherwise similar to pre-stent level.  20 another stent was placed for hydronephrosis and possible obstruction on Lasix Renogram. Creatinine 1.0 at that time. Stent was subsequently removed.    20 cystogram showed transplant ureter reflux    Recent renal ultrasound shows return of mild hydronephrosis but stable creatinine and no obstruction on Lasix Renogram.    Plan:  - creatinine monthly per transplant  -renal ultrasound in 1 month.  ______________________________________________________________________    HPI  Mona Wagner has a history of ESKD 2/2 IgA nephropathy who is s/p right kidney transplant on 2019 with Dr. Johnson complicated by hydronephrosis s/p cystourethroscopy with retrograde pyelography, ureteroscopy and ureteral stent placement on 2019. Imaging did not show any evidence of filling defects or strictures.    She had another cystoscopy retrograde pyelogram on 20 that showed mild hydronephrosis and no strictures in the transplant ureter.      Eventually another stent was placed  11/23/20.    5/30/21  She denies any pain or recent infections.    5/10/21 renal ultrasound   I reviewed the radiologic images and report from this radiologic exam.  My independent interpretation is:    Mild to moderate hydronephrosis in the transplant kidney    Radiologist Impression  Stable moderate hydronephrosis in the transplant kidney, unchanged  after voiding.      I reviewed the following laboratory data and went over findings with patient:  Recent Labs   Lab Test 04/06/21  0920 03/03/21  0912 02/02/21  0920 12/28/20  0910   WBC 6.7 7.2 6.3 7.2   HGB 12.3 12.6 12.7 12.5    232 237 245     Recent Labs   Lab Test 04/06/21  0920 03/31/21  0900 03/23/21  0920 03/17/21  0926   CR 1.09* 1.00 0.99 0.98   GFRESTIMATED 69 76 77 78   GFRESTBLACK 80 89 89 >90   GLC 91 96 91 92     Video-Visit Details  Video Start Time: 1044  Video End Time: 1055  Time spent on pre-visit work, post visit work, and documentation on day of visit outside of time during video visit: 5 min  Total time: 16 min  Originating Location (pt. Location): Home.    Distant Location (provider location):  [facility] UROLOGY AND [facility] FOR PROSTATE AND UROLOGIC CANCERS.    Platform used for Video Visit: Doxy  Type of service:  Video Visit

## 2021-05-14 NOTE — NURSING NOTE
Chief Complaint   Patient presents with     RECHECK     Ureteral stricture follow up with renal us     Cris Mcginnis, CMA

## 2021-05-26 VITALS
DIASTOLIC BLOOD PRESSURE: 92 MMHG | RESPIRATION RATE: 16 BRPM | SYSTOLIC BLOOD PRESSURE: 138 MMHG | TEMPERATURE: 99.3 F | OXYGEN SATURATION: 98 % | HEART RATE: 78 BPM

## 2021-05-26 VITALS
SYSTOLIC BLOOD PRESSURE: 112 MMHG | DIASTOLIC BLOOD PRESSURE: 72 MMHG | HEART RATE: 85 BPM | OXYGEN SATURATION: 99 % | TEMPERATURE: 98 F

## 2021-05-28 NOTE — TELEPHONE ENCOUNTER
I called the pt to inform that she does not need abx for dental work, per Dr Mendes. Pt understands.

## 2021-06-02 VITALS — HEIGHT: 60 IN | BODY MASS INDEX: 23.42 KG/M2 | WEIGHT: 119.3 LBS

## 2021-06-02 VITALS — WEIGHT: 119.3 LBS | HEIGHT: 60 IN | BODY MASS INDEX: 23.42 KG/M2

## 2021-06-02 DIAGNOSIS — Z48.298 AFTERCARE FOLLOWING ORGAN TRANSPLANT: ICD-10-CM

## 2021-06-02 DIAGNOSIS — Z79.899 ENCOUNTER FOR LONG-TERM CURRENT USE OF MEDICATION: ICD-10-CM

## 2021-06-02 DIAGNOSIS — Z94.0 KIDNEY REPLACED BY TRANSPLANT: Primary | ICD-10-CM

## 2021-06-02 DIAGNOSIS — N39.0 RECURRENT UTI: ICD-10-CM

## 2021-06-02 DIAGNOSIS — Z94.0 KIDNEY REPLACED BY TRANSPLANT: ICD-10-CM

## 2021-06-02 DIAGNOSIS — E06.3 HYPOTHYROIDISM DUE TO HASHIMOTO'S THYROIDITIS: ICD-10-CM

## 2021-06-02 LAB
ANION GAP SERPL CALCULATED.3IONS-SCNC: 6 MMOL/L (ref 3–14)
BUN SERPL-MCNC: 25 MG/DL (ref 7–30)
CALCIUM SERPL-MCNC: 9.7 MG/DL (ref 8.5–10.1)
CHLORIDE SERPL-SCNC: 108 MMOL/L (ref 94–109)
CO2 SERPL-SCNC: 24 MMOL/L (ref 20–32)
CREAT SERPL-MCNC: 1 MG/DL (ref 0.52–1.04)
DEPRECATED CALCIDIOL+CALCIFEROL SERPL-MC: 15 UG/L (ref 20–75)
ERYTHROCYTE [DISTWIDTH] IN BLOOD BY AUTOMATED COUNT: 13.2 % (ref 10–15)
GFR SERPL CREATININE-BSD FRML MDRD: 76 ML/MIN/{1.73_M2}
GLUCOSE SERPL-MCNC: 90 MG/DL (ref 70–99)
HCT VFR BLD AUTO: 41.7 % (ref 35–47)
HGB BLD-MCNC: 12.9 G/DL (ref 11.7–15.7)
MCH RBC QN AUTO: 28 PG (ref 26.5–33)
MCHC RBC AUTO-ENTMCNC: 30.9 G/DL (ref 31.5–36.5)
MCV RBC AUTO: 91 FL (ref 78–100)
PLATELET # BLD AUTO: 237 10E9/L (ref 150–450)
POTASSIUM SERPL-SCNC: 4 MMOL/L (ref 3.4–5.3)
PROLACTIN SERPL-MCNC: 14 UG/L (ref 3–27)
RBC # BLD AUTO: 4.61 10E12/L (ref 3.8–5.2)
SODIUM SERPL-SCNC: 138 MMOL/L (ref 133–144)
TACROLIMUS BLD-MCNC: 6.1 UG/L (ref 5–15)
TME LAST DOSE: NORMAL H
TSH SERPL DL<=0.005 MIU/L-ACNC: 2.14 MU/L (ref 0.4–4)
WBC # BLD AUTO: 7 10E9/L (ref 4–11)

## 2021-06-02 PROCEDURE — 36415 COLL VENOUS BLD VENIPUNCTURE: CPT

## 2021-06-02 PROCEDURE — 87799 DETECT AGENT NOS DNA QUANT: CPT | Performed by: INTERNAL MEDICINE

## 2021-06-02 PROCEDURE — 80048 BASIC METABOLIC PNL TOTAL CA: CPT | Performed by: INTERNAL MEDICINE

## 2021-06-02 PROCEDURE — 80197 ASSAY OF TACROLIMUS: CPT | Performed by: INTERNAL MEDICINE

## 2021-06-02 PROCEDURE — 82306 VITAMIN D 25 HYDROXY: CPT | Performed by: INTERNAL MEDICINE

## 2021-06-02 PROCEDURE — 85027 COMPLETE CBC AUTOMATED: CPT | Performed by: INTERNAL MEDICINE

## 2021-06-02 PROCEDURE — 84443 ASSAY THYROID STIM HORMONE: CPT | Performed by: INTERNAL MEDICINE

## 2021-06-02 PROCEDURE — 84146 ASSAY OF PROLACTIN: CPT | Performed by: INTERNAL MEDICINE

## 2021-06-03 VITALS — WEIGHT: 114 LBS | BODY MASS INDEX: 22.26 KG/M2

## 2021-06-05 ENCOUNTER — HEALTH MAINTENANCE LETTER (OUTPATIENT)
Age: 31
End: 2021-06-05

## 2021-06-07 LAB
DEPRECATED CALCIDIOL+CALCIFEROL SERPL-MC: <23 UG/L (ref 20–75)
VITAMIN D2 SERPL-MCNC: <5 UG/L
VITAMIN D3 SERPL-MCNC: 18 UG/L

## 2021-06-10 NOTE — TELEPHONE ENCOUNTER
CESARM Received preconception referral from [facility]. Patient will need to be seen at [facility].       ,   Briana Toledo

## 2021-06-18 NOTE — LETTER
Letter by Malissa Mendes MD at      Author: Malissa Mendes MD Service: -- Author Type: --    Filed:  Encounter Date: 3/11/2019 Status: (Other)         PETER HAYNES MD, am writing this letter to inform you Mona Wagner is a 28 y.o. 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 She had negative surveillance blood cultures a week post treatment. From the endocarditis perspective, the patient can get back on the kidney transplant list.   Feel feel to call my office with any additional questions you have.     Sincerely,    PETER Mendes MD

## 2021-06-23 NOTE — TELEPHONE ENCOUNTER
Vaughn    Please call Dr Valdez @ [facility] back.    DX of Positive blood culture. Not urgent, bu some time today.

## 2021-06-24 NOTE — TELEPHONE ENCOUNTER
Critical lab result: Creatinine =8.62, (high). Drawn @ 12:12pm today. Ordered by Dr Mendes, infectious disease. Dr Cornelius on call: he was paged to Alexs[facility] @ [facility] @ 11:09pm.     Daisy Escoto RN [facility] Triage Nurse

## 2021-06-24 NOTE — PATIENT INSTRUCTIONS - HE
Thank you for coming today.   You look better than in the hospital.   We are doing 4 weeks of IV Vanco after HD till 2/22/19.  The diarrhea is worrisome for C Diff.     Plan:  -Continue IV Vanco after HD until 2/22/19.   -Weekly CBC/Diff, BMP and CRP   -TTE on 2/19  -C Diff pcr today. If positive I will send a prescription of PO Vanco 125 mg four times a day for 14 days.  -Surveillance blood cultures on 3/1/19.  -If blood cultures and TTE unremarkable then you'll be cleared to get back on the transplant list.   -Follow up as needed.     PETER Mendes MD

## 2021-06-27 NOTE — PROGRESS NOTES
Progress Notes by Malissa Mendes MD at 2019 10:30 AM     Author: Malissa Mendes MD Service: -- Author Type: Physician    Filed: 2019 11:51 AM Encounter Date: 2019 Status: Signed    : Malissa Mendes MD (Physician)                                 [facility] post-hospital stay follow up.    Name: Mona Wagner  :   1990  MRN:   [id number]  PCP:    Macarena Bowen MD  DOS:    19      CC: Post Hospital Stay follow up for pulmonary valve endocarditis with Streptococcus viridans bacteremia    HPI/Interval History:  Mona Wagner is a 28 y.o. female with the history of end-stage renal disease on dialysis secondary to IgA nephropathy previously on the transplant list who was admitted to [facility] with 3 weeks of a febrile illness and found to have bacteremia with Streptococcus viridans.  Positive blood cultures on 2019 and 2019.  HECTOR showed at 8 x 12 mm mobile vegetation on the pulmonary valve.  The patient was discharged on IV vancomycin with dialysis.  Dr. Ballesteros has set that up.  Patient is clinic today and feels much better.  Report was having diarrhea previously but over the past 48hours her stools have become watery with some gas pain as well as increased frequency.  As of this morning at 11 AM she already had 4 episodes of watery stool.  She denies any fever, chills, or night sweats.  Her dialysis center was called for labs.  They faxed labs from 2019 and no other labs.  Patient reports having been getting weekly labs as recommended.  She will need to be cleared from the ID perspective to get back on the transplant list.    PMH:  Past Medical History:   Diagnosis Date   ? Brain edema (H)    ? DVT of upper extremity (deep vein thrombosis) (H)    ? ESRD (end stage renal disease) on dialysis (H)    ? Fluid overload    ? Heart murmur    ? History of transfusion    ? Hypertension    ? Organ transplant candidate    ? Pituitary  tumor    ? SAH (subarachnoid hemorrhage) (H)    ? Seizure (H)    ? UTI (lower urinary tract infection)        PSH:  Past Surgical History:   Procedure Laterality Date   ? AV FISTULA PLACEMENT         Social History/Family History:  .  No alcohol, drug, or smoking.  No family history of recurrent infection.    Allergies:  Allergies   Allergen Reactions   ? Lisinopril Angioedema   ? Chlorhexidine Rash   ? Isopropyl Alcohol Rash   ? Sulfa (Sulfonamide Antibiotics) Rash       Medications:  Current Outpatient Medications on File Prior to Visit   Medication Sig Dispense Refill   ? acetaminophen (TYLENOL) 325 MG tablet Take 325-650 mg by mouth every 4 (four) hours as needed for pain.            ? amLODIPine (NORVASC) 10 MG tablet Take 1 tablet (10 mg total) by mouth daily. 30 tablet 0   ? B complex w-C no.20/folic acid (VIRT-CAPS ORAL) Take 1 capsule by mouth at bedtime.     ? cinacalcet (SENSIPAR) 30 MG tablet Take 30 mg by mouth at bedtime.     ? doxazosin (CARDURA) 1 MG tablet Take 2 mg by mouth at bedtime.     ? HYDROcodone-acetaminophen 5-325 mg per tablet 1 tablet at the end of each dialysis run to prevent post dialysis headaches. (Patient taking differently: Take 1 tablet by mouth every 6 (six) hours as needed for pain. 1 tablet at the end of each dialysis run to prevent post dialysis headaches.      ) 12 tablet 0   ? levETIRAcetam (KEPPRA) 250 MG tablet Take 375 mg by mouth daily. Also takes additional 250 mg after dialysis MWF     ? levothyroxine (SYNTHROID, LEVOTHROID) 25 MCG tablet Takes 25 mcg TuesdayThursdaySaturdaySunday, 37.5 mcg MondayWednesdayFriday           ? norethindrone (MICRONOR) 0.35 mg tablet Take 1 tablet by mouth daily.     ? senna (SENOKOT) 8.6 mg tablet Take 1-2 tablets by mouth daily as needed for constipation.     ? sevelamer (RENVELA) 800 mg tablet Take 800 mg by mouth as needed (Take with snacks.).     ? simethicone (MYLICON) 125 MG chewable tablet Chew 125 mg 2 (two) times a day as  needed for flatulence.     ? vancomycin in sodium chloride 0.9% 250 mL IVPB 1 gm 3 times a week after HD. Vanco through goal 15-20.  0   ? [DISCONTINUED] hydrALAZINE (APRESOLINE) 25 MG tablet Take 1 tablet (25 mg total) by mouth 3 (three) times a day. 90 tablet 0   ? [DISCONTINUED] hydrOXYzine HCl (ATARAX) 50 MG tablet Take 50 mg by mouth every 6 (six) hours as needed for anxiety.     ? [DISCONTINUED] sevelamer carbonate (RENVELA) 800 mg tablet Take 800 mg by mouth 3 (three) times a day with meals.       No current facility-administered medications on file prior to visit.        Review of System:  12 points review of system is negative except for findings in the HPI.    Exam  VS:   Vitals:    02/12/19 1053   BP: 122/88   Pulse: 76   Temp: 98.1  F (36.7  C)       Gen: Pleasant in no acute distress.  HEENT: NCAT. EOMI.  Neck: No LAD.  Lungs: Clear to auscultation bilaterally with no crackles or wheezes.   Card: RRR. No RMG. Peripheral pulses present and symmetrical. No edema.   Abd: Soft NT ND. No hepatomegaly or splenomegaly.  Ext: Left upper extremity AV fistula with good thrill.  Lymph: No cervical or supraclavicular adenopathy.  Skin: No rash  Neuro: Alert and oriented to place time and person. Cranial nerves grossly intact.     Labs:    Component 1/17/19 1118   Specimen Description Blood Abnormal     Comment: Special Requests         None   Isolate SEE NOTES Abnormal     Comment: Culture                  Streptococcus mitis isolated   Report status            FINAL 01/23/2019   ISO Susceptibility Streptococcus mitis isolated Abnormal     Comment: Organism:                Streptococcus mitis isolated   Method                   LINDA Gram Pos Panel   Ampicillin               <=0.06 Susceptible   Penicillin               <=0.03 Susceptible   Vancomycin               0.5 Susceptible   Cefotaxime               <=0.25 Susceptible   Ceftriaxone              <=0.25 Susceptible   Erythromycin             <=0.06 Susceptible    Clindamycin              <=0.06 Susceptible        Imaging:  HECTOR:     Echo Transesophageal   Order# [id number]   Reading physician: Octavio Lazcano DO Ordering physician: Malissa Mendes MD Study date: 19   Performing Date Performing Department   2019  CARDIAC TESTING [[id number]]   Patient Information     Patient Name  Mona Wagner MRN  [id number] Sex  Female              Age  1990 (28 y.o.)   Indications     Endocarditis   Summary       Left ventricle ejection fraction is normal. The estimated left ventricular ejection fraction is 70%.    Normal right ventricular size and systolic function.    Small pericardial effusion.    Pulmonic Valve: There is a 8 mm x 12 mm mobile density present in the RVOT near base of pulmonic valve most consistent with vegitation.    No previous study for comparison.    Right Ventricle: Normal right ventricular size and systolic function.    Normal left ventricular size and systolic function.         Assessment:  Mona Wagner is a 28 y.o. female with   1.  IgA nephropathy complicated with end-stage renal disease on dialysis  2.  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  Getting IV vancomycin with dialysis.  No labs available to me today in clinic.  Dialysis center called today.  They fax results from 2019.  Will check CBC with differential, BMP, and CRP in clinic today.  We will do 5 weeks total of IV vancomycin from first negative blood cultures on 2019 until 2019.  On 3/1/2019, will obtain to set of blood cultures for surveillance.  Also ordered a TTE on 2019.  3.  Diarrhea with watery stool.  Worrisome for C. difficile.  Will check PCR.  If positive will treat with p.o. vancomycin for 14 days.  4.  Transplant status.  If TTE is acceptable and surveillance blood cultures from 3/1/2019 no growth, the patient will be cleared from the ID perspective to get back on the transplant  list.    Recommendations:  -Continue IV Vanco after HD until 2/22/19.   -Weekly CBC/Diff, BMP and CRP   -TTE on 2/19  -C Diff pcr today. If positive I will send a prescription of PO Vanco 125 mg four times a day for 14 days.  -Surveillance blood cultures on 3/1/19.  -If blood cultures and TTE unremarkable then you'll be cleared to get back on the transplant list.   -Follow up as needed.     40 minutes of total care with greater than 50% coordinating care      PETER Mendes  [facility]  [facility]  Office Telephone [phone].  Fax [phone]

## 2021-07-01 ENCOUNTER — ANCILLARY PROCEDURE (OUTPATIENT)
Dept: ULTRASOUND IMAGING | Facility: CLINIC | Age: 31
End: 2021-07-01
Attending: UROLOGY
Payer: MEDICARE

## 2021-07-01 DIAGNOSIS — N13.5 STRICTURE OR KINKING OF URETER: ICD-10-CM

## 2021-07-01 PROCEDURE — 76776 US EXAM K TRANSPL W/DOPPLER: CPT | Performed by: RADIOLOGY

## 2021-07-03 NOTE — ADDENDUM NOTE
Addendum Note by Jenifer Payne RN at 2/12/2019 10:30 AM     Author: Jenifer Payne RN Service: -- Author Type: Registered Nurse    Filed: 2/12/2019  3:20 PM Encounter Date: 2/12/2019 Status: Signed    : Jenifer Payne RN (Registered Nurse)    Addended by: JENIFER PAYNE on: 2/12/2019 03:20 PM        Modules accepted: SmartSet

## 2021-07-03 NOTE — ADDENDUM NOTE
Addendum Note by Jenifer Payne RN at 2/12/2019 10:30 AM     Author: Jenifer Payne RN Service: -- Author Type: Registered Nurse    Filed: 2/13/2019  9:23 AM Encounter Date: 2/12/2019 Status: Signed    : Jenifer Payne RN (Registered Nurse)    Addended by: JENIFER PAYNE on: 2/13/2019 09:23 AM        Modules accepted: Orders

## 2021-07-03 NOTE — ADDENDUM NOTE
Addendum Note by Jenifer Payne RN at 2/12/2019 10:30 AM     Author: Jenifer Payne RN Service: -- Author Type: Registered Nurse    Filed: 2/12/2019 12:18 PM Encounter Date: 2/12/2019 Status: Signed    : Jenifer Payne RN (Registered Nurse)    Addended by: JENIFER PAYNE on: 2/12/2019 12:18 PM        Modules accepted: Orders

## 2021-07-07 DIAGNOSIS — Z48.298 AFTERCARE FOLLOWING ORGAN TRANSPLANT: ICD-10-CM

## 2021-07-07 DIAGNOSIS — N39.0 RECURRENT UTI: ICD-10-CM

## 2021-07-07 DIAGNOSIS — Z94.0 KIDNEY REPLACED BY TRANSPLANT: ICD-10-CM

## 2021-07-07 DIAGNOSIS — Z79.899 ENCOUNTER FOR LONG-TERM CURRENT USE OF MEDICATION: ICD-10-CM

## 2021-07-07 DIAGNOSIS — Z94.0 KIDNEY REPLACED BY TRANSPLANT: Primary | ICD-10-CM

## 2021-07-07 LAB
ANION GAP SERPL CALCULATED.3IONS-SCNC: 7 MMOL/L (ref 3–14)
BUN SERPL-MCNC: 23 MG/DL (ref 7–30)
CALCIUM SERPL-MCNC: 9.7 MG/DL (ref 8.5–10.1)
CHLORIDE SERPL-SCNC: 108 MMOL/L (ref 94–109)
CO2 SERPL-SCNC: 22 MMOL/L (ref 20–32)
CREAT SERPL-MCNC: 0.91 MG/DL (ref 0.52–1.04)
ERYTHROCYTE [DISTWIDTH] IN BLOOD BY AUTOMATED COUNT: 13.2 % (ref 10–15)
GFR SERPL CREATININE-BSD FRML MDRD: 86 ML/MIN/{1.73_M2}
GLUCOSE SERPL-MCNC: 91 MG/DL (ref 70–99)
HCT VFR BLD AUTO: 39.1 % (ref 35–47)
HGB BLD-MCNC: 12.2 G/DL (ref 11.7–15.7)
MCH RBC QN AUTO: 27.9 PG (ref 26.5–33)
MCHC RBC AUTO-ENTMCNC: 31.2 G/DL (ref 31.5–36.5)
MCV RBC AUTO: 89 FL (ref 78–100)
PLATELET # BLD AUTO: 220 10E9/L (ref 150–450)
POTASSIUM SERPL-SCNC: 4.2 MMOL/L (ref 3.4–5.3)
RBC # BLD AUTO: 4.38 10E12/L (ref 3.8–5.2)
SODIUM SERPL-SCNC: 138 MMOL/L (ref 133–144)
TACROLIMUS BLD-MCNC: 5.1 UG/L (ref 5–15)
TME LAST DOSE: NORMAL H
WBC # BLD AUTO: 7.2 10E9/L (ref 4–11)

## 2021-07-07 PROCEDURE — 80197 ASSAY OF TACROLIMUS: CPT | Performed by: INTERNAL MEDICINE

## 2021-07-07 PROCEDURE — 87799 DETECT AGENT NOS DNA QUANT: CPT | Performed by: INTERNAL MEDICINE

## 2021-07-07 PROCEDURE — 36415 COLL VENOUS BLD VENIPUNCTURE: CPT

## 2021-07-07 PROCEDURE — 80048 BASIC METABOLIC PNL TOTAL CA: CPT | Performed by: INTERNAL MEDICINE

## 2021-07-07 PROCEDURE — 85027 COMPLETE CBC AUTOMATED: CPT | Performed by: INTERNAL MEDICINE

## 2021-07-13 DIAGNOSIS — Z30.41 SURVEILLANCE OF PREVIOUSLY PRESCRIBED CONTRACEPTIVE PILL: ICD-10-CM

## 2021-07-13 NOTE — TELEPHONE ENCOUNTER
Message to physician:     Date of last visit: 3/12/2021     Date of next visit if scheduled none    Last Comprehensive Metabolic Panel:  Sodium   Date Value Ref Range Status   07/07/2021 138 133 - 144 mmol/L Final     Potassium   Date Value Ref Range Status   07/07/2021 4.2 3.4 - 5.3 mmol/L Final     Chloride   Date Value Ref Range Status   07/07/2021 108 94 - 109 mmol/L Final     Carbon Dioxide   Date Value Ref Range Status   07/07/2021 22 20 - 32 mmol/L Final     Anion Gap   Date Value Ref Range Status   07/07/2021 7 3 - 14 mmol/L Final     Glucose   Date Value Ref Range Status   07/07/2021 91 70 - 99 mg/dL Final     Urea Nitrogen   Date Value Ref Range Status   07/07/2021 23 7 - 30 mg/dL Final     Creatinine   Date Value Ref Range Status   07/07/2021 0.91 0.52 - 1.04 mg/dL Final     GFR Estimate   Date Value Ref Range Status   07/07/2021 86 >60 mL/min/[1.73_m2] Final     Comment:     Non  GFR Calc  Starting 12/18/2018, serum creatinine based estimated GFR (eGFR) will be   calculated using the Chronic Kidney Disease Epidemiology Collaboration   (CKD-EPI) equation.       Calcium   Date Value Ref Range Status   07/07/2021 9.7 8.5 - 10.1 mg/dL Final       BP Readings from Last 3 Encounters:   03/12/21 108/66   03/05/21 106/71   02/26/21 105/67       Lab Results   Component Value Date    A1C 5.5 08/04/2020    A1C 5.7 02/03/2020    A1C 4.8 08/03/2019    A1C 4.8 08/02/2019                Please complete refill and CLOSE ENCOUNTER.  Closing the encounter signifies the refill is complete.

## 2021-07-14 RX ORDER — ACETAMINOPHEN AND CODEINE PHOSPHATE 120; 12 MG/5ML; MG/5ML
SOLUTION ORAL
Qty: 84 TABLET | Refills: 1 | Status: SHIPPED | OUTPATIENT
Start: 2021-07-14 | End: 2021-12-29

## 2021-07-16 ENCOUNTER — LAB (OUTPATIENT)
Dept: LAB | Facility: CLINIC | Age: 31
End: 2021-07-16

## 2021-07-16 DIAGNOSIS — N13.5 STRICTURE OR KINKING OF URETER: ICD-10-CM

## 2021-07-16 LAB
ALBUMIN UR-MCNC: NEGATIVE MG/DL
APPEARANCE UR: CLEAR
BACTERIA #/AREA URNS HPF: ABNORMAL /HPF
BILIRUB UR QL STRIP: NEGATIVE
COLOR UR AUTO: COLORLESS
GLUCOSE UR STRIP-MCNC: NEGATIVE MG/DL
HGB UR QL STRIP: NEGATIVE
KETONES UR STRIP-MCNC: NEGATIVE MG/DL
LEUKOCYTE ESTERASE UR QL STRIP: ABNORMAL
MUCOUS THREADS #/AREA URNS LPF: PRESENT /LPF
NITRATE UR QL: NEGATIVE
PH UR STRIP: 5.5 [PH] (ref 5–7)
RBC URINE: <1 /HPF
SP GR UR STRIP: 1.01 (ref 1–1.03)
SQUAMOUS EPITHELIAL: 1 /HPF
UROBILINOGEN UR STRIP-MCNC: <2 MG/DL
WBC URINE: 20 /HPF

## 2021-07-16 PROCEDURE — 87086 URINE CULTURE/COLONY COUNT: CPT

## 2021-07-16 PROCEDURE — 87186 SC STD MICRODIL/AGAR DIL: CPT

## 2021-07-16 PROCEDURE — 81003 URINALYSIS AUTO W/O SCOPE: CPT | Performed by: UROLOGY

## 2021-07-19 LAB — BACTERIA UR CULT: ABNORMAL

## 2021-07-21 DIAGNOSIS — I95.1 ORTHOSTATIC HYPOTENSION: ICD-10-CM

## 2021-07-23 RX ORDER — FLUDROCORTISONE ACETATE 0.1 MG/1
0.1 TABLET ORAL
Qty: 90 TABLET | Refills: 1 | Status: SHIPPED | OUTPATIENT
Start: 2021-07-23 | End: 2023-01-16

## 2021-07-25 DIAGNOSIS — Z94.0 KIDNEY REPLACED BY TRANSPLANT: ICD-10-CM

## 2021-07-26 ENCOUNTER — MYC MEDICAL ADVICE (OUTPATIENT)
Dept: UROLOGY | Facility: CLINIC | Age: 31
End: 2021-07-26

## 2021-07-26 ENCOUNTER — TELEPHONE (OUTPATIENT)
Dept: UROLOGY | Facility: CLINIC | Age: 31
End: 2021-07-26

## 2021-07-26 DIAGNOSIS — N39.0 URINARY TRACT INFECTION: Primary | ICD-10-CM

## 2021-07-26 RX ORDER — CIPROFLOXACIN 250 MG/1
250 TABLET, FILM COATED ORAL 2 TIMES DAILY
Qty: 10 TABLET | Refills: 0 | Status: SHIPPED | OUTPATIENT
Start: 2021-07-26 | End: 2021-10-07

## 2021-07-26 RX ORDER — ATORVASTATIN CALCIUM 10 MG/1
10 TABLET, FILM COATED ORAL EVERY MORNING
Qty: 90 TABLET | Refills: 3 | Status: SHIPPED | OUTPATIENT
Start: 2021-07-26 | End: 2023-01-16

## 2021-07-26 NOTE — TELEPHONE ENCOUNTER
Patient called and stated had augmentin but it did not feel like it was working.   Ordered cipro per patients request 250mg for 5 days and told to call us if not feeling better Cris Drake LPN Staff Nurse

## 2021-07-26 NOTE — TELEPHONE ENCOUNTER
[facility] Call Center    Phone Message    May a detailed message be left on voicemail: yes     Reason for Call: Symptoms or Concerns     If patient has red-flag symptoms, warm transfer to triage line    Current symptom or concern: Frequency, urgency, 5-6/10 on pain scale, bladder pressure    Symptoms have been present for:  1 week(s)    Has patient previously been seen for this? No    By :     Date:     Are there any new or worsening symptoms? Yes: Worsening - Pt has tried an antibiotic that does not seem to be working. Please give Pt a call to discuss her symptoms and antibiotic. Thank you!      Action Taken: Message routed to:  [facility] Uro    Travel Screening: Not Applicable

## 2021-08-02 ENCOUNTER — RESULTS ONLY (OUTPATIENT)
Dept: LAB | Facility: CLINIC | Age: 31
End: 2021-08-02

## 2021-08-02 ENCOUNTER — LAB (OUTPATIENT)
Dept: LAB | Facility: CLINIC | Age: 31
End: 2021-08-02
Payer: MEDICARE

## 2021-08-02 DIAGNOSIS — N12 PYELONEPHRITIS: ICD-10-CM

## 2021-08-02 DIAGNOSIS — N13.5 STRICTURE OR KINKING OF URETER: ICD-10-CM

## 2021-08-02 LAB
ANION GAP SERPL CALCULATED.3IONS-SCNC: 7 MMOL/L (ref 3–14)
BUN SERPL-MCNC: 23 MG/DL (ref 7–30)
CALCIUM SERPL-MCNC: 9.7 MG/DL (ref 8.5–10.1)
CHLORIDE BLD-SCNC: 107 MMOL/L
CO2 SERPL-SCNC: 26 MMOL/L (ref 20–32)
CREAT SERPL-MCNC: 1.1 MG/DL
ERYTHROCYTE [DISTWIDTH] IN BLOOD BY AUTOMATED COUNT: 12.7 % (ref 10–15)
GFR SERPL CREATININE-BSD FRML MDRD: 68 ML/MIN/1.73M2
GLUCOSE BLD-MCNC: 96 MG/DL (ref 70–99)
HCT VFR BLD AUTO: 40.1 % (ref 35–47)
HGB BLD-MCNC: 12.6 G/DL (ref 11.7–15.7)
MCH RBC QN AUTO: 27.5 PG (ref 26.5–33)
MCHC RBC AUTO-ENTMCNC: 31.4 G/DL (ref 31.5–36.5)
MCV RBC AUTO: 88 FL (ref 78–100)
PLATELET # BLD AUTO: 232 10E3/UL (ref 150–450)
POTASSIUM BLD-SCNC: 4.5 MMOL/L (ref 3.4–5.3)
RBC # BLD AUTO: 4.58 10E6/UL (ref 3.8–5.2)
SODIUM SERPL-SCNC: 140 MMOL/L (ref 133–144)
WBC # BLD AUTO: 7.9 10E3/UL (ref 4–11)

## 2021-08-02 PROCEDURE — 87799 DETECT AGENT NOS DNA QUANT: CPT

## 2021-08-02 PROCEDURE — 80197 ASSAY OF TACROLIMUS: CPT

## 2021-08-02 PROCEDURE — 36415 COLL VENOUS BLD VENIPUNCTURE: CPT

## 2021-08-02 PROCEDURE — 86832 HLA CLASS I HIGH DEFIN QUAL: CPT

## 2021-08-02 PROCEDURE — 86833 HLA CLASS II HIGH DEFIN QUAL: CPT

## 2021-08-02 PROCEDURE — 80048 BASIC METABOLIC PNL TOTAL CA: CPT

## 2021-08-02 PROCEDURE — 85027 COMPLETE CBC AUTOMATED: CPT

## 2021-08-04 ENCOUNTER — LAB (OUTPATIENT)
Dept: LAB | Facility: CLINIC | Age: 31
End: 2021-08-04
Payer: MEDICARE

## 2021-08-04 DIAGNOSIS — N13.5 STRICTURE OR KINKING OF URETER: ICD-10-CM

## 2021-08-04 LAB
ALBUMIN UR-MCNC: NEGATIVE MG/DL
APPEARANCE UR: CLEAR
BILIRUB UR QL STRIP: NEGATIVE
BKV DNA # SPEC NAA+PROBE: NOT DETECTED COPIES/ML
COLOR UR AUTO: COLORLESS
GLUCOSE UR STRIP-MCNC: NEGATIVE MG/DL
HGB UR QL STRIP: NEGATIVE
KETONES UR STRIP-MCNC: NEGATIVE MG/DL
LEUKOCYTE ESTERASE UR QL STRIP: NEGATIVE
NITRATE UR QL: NEGATIVE
PH UR STRIP: 5.5 [PH] (ref 5–7)
SP GR UR STRIP: 1.01 (ref 1–1.03)
TACROLIMUS BLD-MCNC: 7.6 UG/L (ref 5–15)
TME LAST DOSE: NORMAL H
TME LAST DOSE: NORMAL H
UROBILINOGEN UR STRIP-MCNC: <2 MG/DL

## 2021-08-04 PROCEDURE — 81003 URINALYSIS AUTO W/O SCOPE: CPT

## 2021-08-04 PROCEDURE — 82570 ASSAY OF URINE CREATININE: CPT

## 2021-08-05 LAB — CREAT UR-MCNC: 67 MG/DL

## 2021-08-06 LAB
DONOR IDENTIFICATION: NORMAL
DSA COMMENTS: NORMAL
DSA PRESENT: NO
DSA TEST METHOD: NORMAL
ORGAN: NORMAL
SA 1 CELL: NORMAL
SA 1 TEST METHOD: NORMAL
SA 2 CELL: NORMAL
SA 2 TEST METHOD: NORMAL
SA1 HI RISK ABY: NORMAL
SA1 MOD RISK ABY: NORMAL
SA2 HI RISK ABY: NORMAL
SA2 MOD RISK ABY: NORMAL
UNACCEPTABLE ANTIGENS: NORMAL
UNOS CPRA: 25
ZZZSA 1  COMMENTS: NORMAL
ZZZSA 2 COMMENTS: NORMAL

## 2021-08-12 ENCOUNTER — VIRTUAL VISIT (OUTPATIENT)
Dept: NEPHROLOGY | Facility: CLINIC | Age: 31
End: 2021-08-12
Attending: INTERNAL MEDICINE
Payer: MEDICARE

## 2021-08-12 DIAGNOSIS — D84.9 IMMUNOSUPPRESSION (H): ICD-10-CM

## 2021-08-12 DIAGNOSIS — Z94.0 KIDNEY REPLACED BY TRANSPLANT: Primary | ICD-10-CM

## 2021-08-12 DIAGNOSIS — T86.19 URETERAL STENOSIS OF KIDNEY TRANSPLANT: ICD-10-CM

## 2021-08-12 DIAGNOSIS — N25.81 SECONDARY HYPERPARATHYROIDISM (H): ICD-10-CM

## 2021-08-12 DIAGNOSIS — N13.5 URETERAL STENOSIS OF KIDNEY TRANSPLANT: ICD-10-CM

## 2021-08-12 PROCEDURE — 99215 OFFICE O/P EST HI 40 MIN: CPT | Mod: 95 | Performed by: INTERNAL MEDICINE

## 2021-08-12 RX ORDER — SODIUM BICARBONATE 650 MG/1
3 TABLET ORAL 2 TIMES DAILY
COMMUNITY
Start: 2021-07-25 | End: 2021-08-12

## 2021-08-12 RX ORDER — ASPIRIN 81 MG/1
81 TABLET, CHEWABLE ORAL DAILY
COMMUNITY
Start: 2021-07-25 | End: 2021-12-26

## 2021-08-12 ASSESSMENT — PAIN SCALES - GENERAL: PAINLEVEL: NO PAIN (0)

## 2021-08-12 NOTE — LETTER
8/12/2021       RE: Mona Wagner  [address]  [place] [address]     Dear Colleague,    Thank you for referring your patient, Mona Wagner, to the [facility] [place] [facility]. Please see a copy of my visit note below.    Mona is a 30 year old who is being evaluated via a billable video visit.      How would you like to obtain your AVS? MyChart  If the video visit is dropped, the invitation should be resent by: Text to cell phone: [phone]  Will anyone else be joining your video visit? No    Video Start Time: 1:56 PM  Video-Visit Details    Type of service:  Video Visit    Video End Time:2:37pm    Originating Location (pt. 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 Stent replaced most recently 11/23/20.    - Baseline Cr ~ 1.0-1.2, most recently 0.93   - Proteinuria: Minimal (0.2-0.5 grams)   - Date DSA Last Checked: Aug/2021      Latest DSA: No   - BK Viremia: No Aug 2021   - Kidney Tx Biopsy: Sep 17, 2019; Result: No diagnostic evidence of acute rejection.      # Immunosuppression: Tacrolimus immediate release (goal 4-6) and Mycophenolic acid (dose 540 mg every 12 hours)   - Changes: No. When cleared for pregnancy by urology would decrease MPA to 360mg bid x3 days while starting AZA 150mg daily, stop MPA after those 3 days, giving prednisone 10mg daily x5 days. She would need to wait 6 weeks after  switch to start trying to get pregnant    # Infection Prophylaxis:   Last CD4 Level: 201 ( July 2020)   - PJP: None;    # Orthostatic Hypotension: Controlled, but low at times on Florinef 0.1mg MWF;  Goal BP: > 100, but < 130 systolic   - Changes: No. Class C during pregnancy so would try to hold if she could tolerate     # Anemia in Chronic Renal Disease: Hgb: Stable, now normal      CHARLINE: No   - Iron studies: Replete ( 8/2020)     # Mineral Bone Disorder:   - Secondary renal hyperparathyroidism; PTH level: Mildly elevated (151-300 pg/ml)        On treatment: Cinacalcet.   - Vitamin D; level: Low        On Supplement: No; Not on treatment due to hypercalcemia  - Calcium; level: Normal        On Supplement: No    # Electrolytes:   - Potassium; level: Normal        On supplement: No  - Magnesium; level: Normal        On supplement: Yes  - Bicarbonate; level: Normal        On supplement: Yes, stop bicarb    # Recurrent UTI:   Recently had UTI  In July and treated with Augmentin , but later got ciprofloxacin  Follows with ID  Rpt UA 8/4 wnl    # Ureteral stenosis:   -Stent  Removed last Feb 2021 . Follows with urology . Trying to avoid further stents due to recurrent UTIs              -  Allograft US July 2021: moderate hydronephrosis unchanged with voiding.          # Hyperprolactinemia: Normal prolactin level with last check.  Felt secondary to hypothyroidism versus kidney failure, or less likely now a pituitary microadenoma.  Followed by Endocrinology.    # Future plans for pregnancy:   -The patient saw [facility] 12/24/20 and she knows that she would need to switched from MPA to AZA and wait 3 months prior to pregnancy.    -[facility] requests that she has ureteral stenosis dealt with prior to pregnancy.    -They also request ABO and Ab screen. I will message [facility] regarding lab order              - Will touch base with Dr Britt to decide when she can proceed with pregnancy . Patient was counseled that the transplant ureter can  become compressed during pregnancy and that can lead to further worsening of hydronephrosis . She would have increased risk of requiring ureteral stent vs PNT.               - During pregnancy patient would try to stop Florinef ( category C). Would continue Sensipar.  Patient will continue aspirin 81 mg daily.  Patient will have to hold atorvastatin during pregnancy      # Alopecia: Improved with hair tonic which is a herbal product. Patient clarified the hair tonic does not have ashwagandha in it which can interact with tacrolimus  Tacrolimus itself can lead to hair loss    # Medical Compliance: Yes     # COVID-19 Virus Review: Discussed COVID-19 virus and the potential medical risks.  Reviewed preventative health recommendations, which includes washing hands for 20 seconds, avoid touching your face, and social distancing.  Asked patient to inform the transplant center if they are exposed or diagnosed with this virus.    # COVID Vaccine Completed: Yes        # Transplant History:  Etiology of Kidney Failure: Unknown etiology (no kidney biopsy)  Tx: DDKT  Transplant: 8/3/2019 (Kidney)  Donor Type: Donation after Circulatory Death  Donor Class: Standard Criteria Donor  Crossmatch at time of Tx: negative  DSA at time of Tx: No  Significant changes in immunosuppression: None  Significant transplant-related complications: None    Transplant Office Phone Number: [phone]    Assessment and plan was discussed with the patient and she voiced her understanding and agreement.    Return visit: Return in about 6 months (around 2/12/2022).  Patient seen and discussed with Dr. Miguelina Long MD     Physician Attestation   I, Gelacio Mcintyre MD, personally examined and evaluated this patient.  I discussed the patient with the resident/fellow and care team, and agree with the assessment and plan of care as documented in the note on 08/12/21 .      I personally reviewed vital signs, medications, labs and  imaging.    Gelacio Mcintyre MD  Date of Service (when I saw the patient): 08/12/21          Chief Complaint   Ms. Wagner is a 30 year old here for kidney transplant and immunosuppression management.     History of Present Illness      Recently had UTI  In July and treated with Augmentin , but later got ciprofloxacin  Follows with ID  2 days ago had some discomfort on her LUQ , lasted 30 seconds , resolved   Recurred today again  Any nausea or vomiting   Appetite good   No SOB/CP/dizziness/lightheadedness  No focal weakness/altered sensation.  No voiding difficulty/hematuria /flank pain  No rash   Today her weight is 150 lbs   Wt Readings from Last 3 Encounters:   03/12/21 67.7 kg (149 lb 3.2 oz)   03/05/21 68 kg (150 lb)   11/23/20 67.4 kg (148 lb 9.4 oz)     HOME VITALS : 118/73 , HR 80   New family history - father diagnosed with heart disease         Review of Systems   A comprehensive review of systems was obtained and negative, except as noted in the HPI or PMH.    Problem List   Patient Active Problem List   Diagnosis     DVT of upper extremity (deep vein thrombosis) (H)     Seizure disorder (H)     Galactorrhea     Acquired hypothyroidism     Increased prolactin level     Hypomagnesemia     Infective endocarditis-history of.  1/2019.  resolved.      Aftercare following organ transplant     Immunosuppression (H)     Methemoglobinemia     Kidney replaced by transplant     Anxiety     Secondary renal hyperparathyroidism (H)     Vitamin D deficiency     CKD (chronic kidney disease) stage 3, GFR 30-59 ml/min     Stricture or kinking of ureter     Pyelonephritis     Diarrhea     Bicornate uterus     Normal Pap 3/2020.  repeat 3/2023     Prophylactic antibiotic     Hydronephrosis     Hydronephrosis of kidney transplant     Ureter, stricture     Urinary tract infection       Social History   Social History     Tobacco Use     Smoking status: Never Smoker     Smokeless tobacco: Never Used   Substance Use Topics      Alcohol use: No     Alcohol/week: 0.0 standard drinks     Drug use: No       Allergies   Allergies   Allergen Reactions     Contrast Dye Rash     CT contrast allergy 12/14/19 rash over eyes. Need to have pre medication before a CT WITH CONTRAST      Lisinopril Swelling     angioedema     Nitrous Oxide Other (See Comments)     Sense of doom     Chlorhexidine Rash     Rash at site     Sulfa Drugs Rash     Muscle stiffness of neck     Dapsone      Methemoglobinemia     Furosemide Other (See Comments)     Skin flushing     Metrogel [Metronidazole]      Hives diffusely on body after vaginal administration.     Azithromycin Dizziness and Rash     Cefuroxime Rash       Medications   Current Outpatient Medications   Medication Sig     acetaminophen (TYLENOL) 325 MG tablet Take 2 tablets (650 mg) by mouth every 4 hours as needed for mild pain     ammonium lactate (AMLACTIN) 12 % external cream Apply twice a day as needed to rough bumpy skin.     aspirin (ASA) 81 MG chewable tablet Take 81 mg by mouth daily     atorvastatin (LIPITOR) 10 MG tablet Take 1 tablet (10 mg) by mouth every morning     cinacalcet (SENSIPAR) 30 MG tablet Take 1 tablet (30 mg) by mouth daily     diphenhydrAMINE (BENADRYL) 25 MG capsule Take 1 capsule (25 mg) by mouth as needed for itching or allergies Take 1-2 tablets by mouth every 6 hours PRN itching/allergies     EPINEPHrine (ANY BX GENERIC EQUIV) 0.3 MG/0.3ML injection 2-pack Inject 0.3 mLs (0.3 mg) into the muscle as needed for anaphylaxis     fludrocortisone (FLORINEF) 0.1 MG tablet Take 1 tablet (0.1 mg) by mouth Every Mon, Wed, Fri Morning     hydrOXYzine (ATARAX) 10 MG tablet Take 1 tablet (10 mg) by mouth 3 times daily as needed for anxiety     lactobacillus rhamnosus, GG, (CULTURELL) capsule Take 1 capsule by mouth 2 times daily      levothyroxine (SYNTHROID/LEVOTHROID) 25 MCG tablet TAKE 1 TABLET(25MCG) BY MOUTH TUES, THUR, SAT AND SUN AND 1 A ND 1/ 2 TABLETS( 37.5 MCG) ON MON WED AND FRI      MYFORTIC (BRAND) 180 MG EC tablet Take 3 tablets (540 mg) by mouth 2 times daily     norethindrone (MICRONOR) 0.35 MG tablet Do not restart until your follow up appointment with your surgeon. Discuss restart date at that appointment.     senna-docusate (SENOKOT-S/PERICOLACE) 8.6-50 MG tablet Take 1-2 tablets by mouth 2 times daily (Patient taking differently: Take 1-2 tablets by mouth 2 times daily as needed )     simethicone (MYLICON) 125 MG chewable tablet Take 1 tablet (125 mg) by mouth 4 times daily as needed for intestinal gas     sodium bicarbonate 650 MG tablet Take 3 tablets by mouth 2 times daily     tacrolimus (GENERIC EQUIVALENT) 1 MG capsule Take 3 capsules (3 mg) by mouth 2 times daily     ciprofloxacin (CIPRO) 250 MG tablet Take 1 tablet (250 mg) by mouth 2 times daily (Patient not taking: Reported on 8/12/2021)     clindamycin (CLEOCIN) 2 % vaginal cream  (Patient not taking: Reported on 8/12/2021)     magnesium oxide (MAG-OX) 400 MG tablet Take 1 tablet (400 mg) by mouth every morning (Patient not taking: Reported on 8/12/2021)     oxyCODONE (ROXICODONE) 5 MG tablet Take 1 tablet (5 mg) by mouth every 6 hours as needed for pain (Patient not taking: Reported on 3/5/2021)     tacrolimus (GENERIC EQUIVALENT) 0.5 MG capsule HOLD (Patient not taking: Reported on 8/12/2021)     No current facility-administered medications for this visit.     There are no discontinued medications.     Physical Exam  No vitals for this telemedicine visit    GENERAL APPEARANCE: alert and no distress  HENT: no obvious abnormalities on appearance  RESP: breathing appears unremarkable with normal rate, no audible wheezing or cough and no apparent shortness of breath with conversation  MS: extremities normal - no gross deformities noted  SKIN: no apparent rash and normal skin tone  NEURO: speech is clear with no obvious neurological deficits  PSYCH: mentation appears normal and affect normal    Data     Renal Latest Ref Rng &  Units 8/2/2021 7/7/2021 6/2/2021   Na 133 - 144 mmol/L 140 138 138   Na (external) 136 - 145 mmol/L - - -   K 3.4 - 5.3 mmol/L 4.5 4.2 4.0   K (external) 3.5 - 5.0 mmol/L - - -   Cl mmol/L 107 108 108   Cl (external) 98 - 107 mmol/L - - -   CO2 20 - 32 mmol/L 26 22 24   CO2 (external) 22 - 31 mmol/L - - -   BUN 7 - 30 mg/dL 23 23 25   BUN (external) 8 - 22 mg/dL - - -   Cr mg/dL 1.10 0.91 1.00   Cr (external) 0.60 - 1 mg/dL - - -   Glucose 70 - 99 mg/dL 96 91 90   Glucose (external) 70 - 125 mg/dL - - -   Ca  8.5 - 10.1 mg/dL 9.7 9.7 9.7   Ca (external) 8.5 - 10.5 mg/dL - - -   Mg 1.6 - 2.3 mg/dL - - -   Mg (external) 1.8 - 2.6 mg/dL - - -     Bone Health Latest Ref Rng & Units 6/2/2021 3/3/2021 11/3/2020   Phos 2.5 - 4.5 mg/dL - - 2.6   Phos (external) 2.5 - 4.5 mg/dL - - -   PTHi 18 - 80 pg/mL - 251(H) -   PTHi (external) 18 - 80 pg/mL - - -   Vit D Def 20 - 75 ug/L 15(L) - -     Heme Latest Ref Rng & Units 8/2/2021 7/7/2021 6/2/2021   WBC 4.0 - 11.0 10e3/uL 7.9 7.2 7.0   WBC (external) 4.0 - 11.0 thou/uL - - -   Hgb 11.7 - 15.7 g/dL 12.6 12.2 12.9   Hgb (external) 12.0 - 16.0 g/dL - - -   Plt 150 - 450 10e3/uL 232 220 237   Plt (external) 140 - 440 thou/uL - - -   ABSOLUTE NEUTROPHIL 1.6 - 8.3 10e9/L - - -   ABSOLUTE NEUTROPHILS (EXTERNAL) 2.0 - 7.7 thou/uL - - -   ABSOLUTE LYMPHOCYTES 0.8 - 5.3 10e9/L - - -   ABSOLUTE LYMPHOCYTES (EXTERNAL) 0.8 - 4.4 thou/uL - - -   ABSOLUTE MONOCYTES 0.0 - 1.3 10e9/L - - -   ABSOLUTE MONOCYTES (EXTERNAL) 0.0 - 0.9 thou/uL - - -   ABSOLUTE EOSINOPHILS 0.0 - 0.7 10e9/L - - -   ABSOLUTE EOSINOPHILS (EXTERNAL) 0.0 - 0.4 thou/uL - - -   ABSOLUTE BASOPHILS 0.0 - 0.2 10e9/L - - -   ABSOLUTE BASOPHILS (EXTERNAL) 0.0 - 0.2 thou/uL - - -   ABS IMMATURE GRANULOCYTES 0 - 0.4 10e9/L - - -   ABSOLUTE NUCLEATED RBC - - - -     Liver Latest Ref Rng & Units 8/4/2020 2/3/2020 1/23/2020   AP 40 - 150 U/L 185(H) 126 210(H)   AP (external) 34 - 104 U/L - - -   TBili 0.2 - 1.3 mg/dL 0.7 0.6 1.2    DBili 0.0 - 0.2 mg/dL <0.1 0.1 -   ALT 0 - 50 U/L 19 20 20   ALT (external) 7 - 52 U/L - - -   AST 0 - 45 U/L 11 13 9   AST (external) 13 - 39 U/L - - -   Tot Protein 6.8 - 8.8 g/dL 7.9 7.4 8.1   Tot Protein (external) 6.4 - 8.9 g/dL - - -   Albumin 3.4 - 5.0 g/dL 4.0 3.7 4.5   Albumin (external) - - - -     Pancreas Latest Ref Rng & Units 8/4/2020 2/3/2020 8/3/2019   A1C 0 - 5.6 % 5.5 5.7(H) 4.8     Iron studies Latest Ref Rng & Units 8/4/2020 2/17/2020 12/3/2019   Iron 35 - 180 ug/dL 64 95 47   Iron sat 15 - 46 % 32 42 23   Ferritin 12 - 150 ng/mL 1,065(H) 1,139(H) 1,003(H)   Ferritin (external) 10 - 291 ng/mL - - -     [facility] Txp Virology Latest Ref Rng & Units 7/7/2021 6/2/2021 5/3/2021   CVM DNA Quant - - - -   CMV QUANT IU/ML CMVND:CMV DNA Not Detected [IU]/mL - - -   LOG IU/ML OF CMVQNT <2.1 [Log:IU]/mL - - -   BK Spec - Plasma Plasma Plasma   BK Res BKNEG:BK Virus DNA Not Detected copies/mL BK Virus DNA Not Detected BK Virus DNA Not Detected BK Virus DNA Not Detected   BK Log <2.7 Log copies/mL Not Calculated Not Calculated Not Calculated   EBV VCA IGG ANTIBODY U/mL - - -   EBV CAPSID ANTIBODY IGG 0.0 - 0.8 AI - - -   EBV DNA COPIES/ML EBVNEG:EBV DNA Not Detected [Copies]/mL - - -   EBV DNA LOG OF COPIES <2.7 [Log:copies]/mL - - -   Hep B Surf - - - -        Recent Labs   Lab Test 06/02/21  0925 07/07/21  0920 08/02/21  0920   DOSTAC 6/1/21@2110 07/06/21 2110 8/1/2021   TACROL 6.1 5.1 7.6     Recent Labs   Lab Test 08/16/19  0807 09/03/19  0944   DOSMPA Not Provided 0840pm   MPACID 1.92 3.39   MPAG 149.2* 52.8       Again, thank you for allowing me to participate in the care of your patient.      Sincerely,    Gelacio Mcintyre MD

## 2021-08-12 NOTE — PATIENT INSTRUCTIONS
Stop sodium bicarbonate   Recheck Lipids with next lab work   We will touch base with Urology to assess  Wether you are ok to proceed with pregnancy from their standpoint

## 2021-08-12 NOTE — PROGRESS NOTES
Mona is a 30 year old who is being evaluated via a billable video visit.      How would you like to obtain your AVS? MyChart  If the video visit is dropped, the invitation should be resent by: Text to cell phone: [phone]  Will anyone else be joining your video visit? No    Video Start Time: 1:56 PM  Video-Visit Details    Type of service:  Video Visit    Video End Time:2:37pm    Originating Location (pt. 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 Stent replaced most recently 11/23/20.    - Baseline Cr ~ 1.0-1.2, most recently 0.93   - Proteinuria: Minimal (0.2-0.5 grams)   - Date DSA Last Checked: Aug/2021      Latest DSA: No   - BK Viremia: No Aug 2021   - Kidney Tx Biopsy: Sep 17, 2019; Result: No diagnostic evidence of acute rejection.      # Immunosuppression: Tacrolimus immediate release (goal 4-6) and Mycophenolic acid (dose 540 mg every 12 hours)   - Changes: No. When cleared for pregnancy by urology would decrease MPA to 360mg bid x3 days while starting AZA 150mg daily, stop MPA after those 3 days, giving prednisone 10mg daily x5 days. She would need to wait 6 weeks after switch to start trying to get pregnant    # Infection Prophylaxis:   Last CD4 Level: 201 ( July 2020)   - PJP: None;    # Orthostatic Hypotension: Controlled, but low at times on Florinef 0.1mg MWF;  Goal BP: > 100, but < 130 systolic   - Changes: No. Class C during pregnancy so would try to hold if she  could tolerate     # Anemia in Chronic Renal Disease: Hgb: Stable, now normal      CHARLINE: No   - Iron studies: Replete ( 8/2020)     # Mineral Bone Disorder:   - Secondary renal hyperparathyroidism; PTH level: Mildly elevated (151-300 pg/ml)        On treatment: Cinacalcet.   - Vitamin D; level: Low        On Supplement: No; Not on treatment due to hypercalcemia  - Calcium; level: Normal        On Supplement: No    # Electrolytes:   - Potassium; level: Normal        On supplement: No  - Magnesium; level: Normal        On supplement: Yes  - Bicarbonate; level: Normal        On supplement: Yes, stop bicarb    # Recurrent UTI:   Recently had UTI  In July and treated with Augmentin , but later got ciprofloxacin  Follows with ID  Rpt UA 8/4 wnl    # Ureteral stenosis:   -Stent  Removed last Feb 2021 . Follows with urology . Trying to avoid further stents due to recurrent UTIs              -  Allograft US July 2021: moderate hydronephrosis unchanged with voiding.          # Hyperprolactinemia: Normal prolactin level with last check.  Felt secondary to hypothyroidism versus kidney failure, or less likely now a pituitary microadenoma.  Followed by Endocrinology.    # Future plans for pregnancy:   -The patient saw [facility] 12/24/20 and she knows that she would need to switched from MPA to AZA and wait 3 months prior to pregnancy.    -[facility] requests that she has ureteral stenosis dealt with prior to pregnancy.    -They also request ABO and Ab screen. I will message [facility] regarding lab order              - Will touch base with Dr Britt to decide when she can proceed with pregnancy . Patient was counseled that the transplant ureter can become compressed during pregnancy and that can lead to further worsening of hydronephrosis . She would have increased risk of requiring ureteral stent vs PNT.               - During pregnancy patient would try to stop Florinef ( category C). Would continue Sensipar.  Patient will continue aspirin 81 mg  daily.  Patient will have to hold atorvastatin during pregnancy      # Alopecia: Improved with hair tonic which is a herbal product. Patient clarified the hair tonic does not have ashwagandha in it which can interact with tacrolimus  Tacrolimus itself can lead to hair loss    # Medical Compliance: Yes     # COVID-19 Virus Review: Discussed COVID-19 virus and the potential medical risks.  Reviewed preventative health recommendations, which includes washing hands for 20 seconds, avoid touching your face, and social distancing.  Asked patient to inform the transplant center if they are exposed or diagnosed with this virus.    # COVID Vaccine Completed: Yes        # Transplant History:  Etiology of Kidney Failure: Unknown etiology (no kidney biopsy)  Tx: DDKT  Transplant: 8/3/2019 (Kidney)  Donor Type: Donation after Circulatory Death  Donor Class: Standard Criteria Donor  Crossmatch at time of Tx: negative  DSA at time of Tx: No  Significant changes in immunosuppression: None  Significant transplant-related complications: None    Transplant Office Phone Number: [phone]    Assessment and plan was discussed with the patient and she voiced her understanding and agreement.    Return visit: Return in about 6 months (around 2/12/2022).  Patient seen and discussed with Dr. Miguelina Long MD     Physician Attestation   I, Gelacio Mcintyre MD, personally examined and evaluated this patient.  I discussed the patient with the resident/fellow and care team, and agree with the assessment and plan of care as documented in the note on 08/12/21 .      I personally reviewed vital signs, medications, labs and imaging.    Gelacio Mcintyre MD  Date of Service (when I saw the patient): 08/12/21          Chief Complaint   Ms. Wagner is a 30 year old here for kidney transplant and immunosuppression management.     History of Present Illness      Recently had UTI  In July and treated with Augmentin , but later got  ciprofloxacin  Follows with ID  2 days ago had some discomfort on her LUQ , lasted 30 seconds , resolved   Recurred today again  Any nausea or vomiting   Appetite good   No SOB/CP/dizziness/lightheadedness  No focal weakness/altered sensation.  No voiding difficulty/hematuria /flank pain  No rash   Today her weight is 150 lbs   Wt Readings from Last 3 Encounters:   03/12/21 67.7 kg (149 lb 3.2 oz)   03/05/21 68 kg (150 lb)   11/23/20 67.4 kg (148 lb 9.4 oz)     HOME VITALS : 118/73 , HR 80   New family history - father diagnosed with heart disease         Review of Systems   A comprehensive review of systems was obtained and negative, except as noted in the HPI or PMH.    Problem List   Patient Active Problem List   Diagnosis     DVT of upper extremity (deep vein thrombosis) (H)     Seizure disorder (H)     Galactorrhea     Acquired hypothyroidism     Increased prolactin level     Hypomagnesemia     Infective endocarditis-history of.  1/2019.  resolved.      Aftercare following organ transplant     Immunosuppression (H)     Methemoglobinemia     Kidney replaced by transplant     Anxiety     Secondary renal hyperparathyroidism (H)     Vitamin D deficiency     CKD (chronic kidney disease) stage 3, GFR 30-59 ml/min     Stricture or kinking of ureter     Pyelonephritis     Diarrhea     Bicornate uterus     Normal Pap 3/2020.  repeat 3/2023     Prophylactic antibiotic     Hydronephrosis     Hydronephrosis of kidney transplant     Ureter, stricture     Urinary tract infection       Social History   Social History     Tobacco Use     Smoking status: Never Smoker     Smokeless tobacco: Never Used   Substance Use Topics     Alcohol use: No     Alcohol/week: 0.0 standard drinks     Drug use: No       Allergies   Allergies   Allergen Reactions     Contrast Dye Rash     CT contrast allergy 12/14/19 rash over eyes. Need to have pre medication before a CT WITH CONTRAST      Lisinopril Swelling     angioedema     Nitrous Oxide  Other (See Comments)     Sense of doom     Chlorhexidine Rash     Rash at site     Sulfa Drugs Rash     Muscle stiffness of neck     Dapsone      Methemoglobinemia     Furosemide Other (See Comments)     Skin flushing     Metrogel [Metronidazole]      Hives diffusely on body after vaginal administration.     Azithromycin Dizziness and Rash     Cefuroxime Rash       Medications   Current Outpatient Medications   Medication Sig     acetaminophen (TYLENOL) 325 MG tablet Take 2 tablets (650 mg) by mouth every 4 hours as needed for mild pain     ammonium lactate (AMLACTIN) 12 % external cream Apply twice a day as needed to rough bumpy skin.     aspirin (ASA) 81 MG chewable tablet Take 81 mg by mouth daily     atorvastatin (LIPITOR) 10 MG tablet Take 1 tablet (10 mg) by mouth every morning     cinacalcet (SENSIPAR) 30 MG tablet Take 1 tablet (30 mg) by mouth daily     diphenhydrAMINE (BENADRYL) 25 MG capsule Take 1 capsule (25 mg) by mouth as needed for itching or allergies Take 1-2 tablets by mouth every 6 hours PRN itching/allergies     EPINEPHrine (ANY BX GENERIC EQUIV) 0.3 MG/0.3ML injection 2-pack Inject 0.3 mLs (0.3 mg) into the muscle as needed for anaphylaxis     fludrocortisone (FLORINEF) 0.1 MG tablet Take 1 tablet (0.1 mg) by mouth Every Mon, Wed, Fri Morning     hydrOXYzine (ATARAX) 10 MG tablet Take 1 tablet (10 mg) by mouth 3 times daily as needed for anxiety     lactobacillus rhamnosus, GG, (CULTURELL) capsule Take 1 capsule by mouth 2 times daily      levothyroxine (SYNTHROID/LEVOTHROID) 25 MCG tablet TAKE 1 TABLET(25MCG) BY MOUTH TUES, THAPOLONIA, SAT AND SUN AND 1 A ND 1/ 2 TABLETS( 37.5 MCG) ON MON WED AND FRI     MYFORTIC (BRAND) 180 MG EC tablet Take 3 tablets (540 mg) by mouth 2 times daily     norethindrone (MICRONOR) 0.35 MG tablet Do not restart until your follow up appointment with your surgeon. Discuss restart date at that appointment.     senna-docusate (SENOKOT-S/PERICOLACE) 8.6-50 MG tablet Take  1-2 tablets by mouth 2 times daily (Patient taking differently: Take 1-2 tablets by mouth 2 times daily as needed )     simethicone (MYLICON) 125 MG chewable tablet Take 1 tablet (125 mg) by mouth 4 times daily as needed for intestinal gas     sodium bicarbonate 650 MG tablet Take 3 tablets by mouth 2 times daily     tacrolimus (GENERIC EQUIVALENT) 1 MG capsule Take 3 capsules (3 mg) by mouth 2 times daily     ciprofloxacin (CIPRO) 250 MG tablet Take 1 tablet (250 mg) by mouth 2 times daily (Patient not taking: Reported on 8/12/2021)     clindamycin (CLEOCIN) 2 % vaginal cream  (Patient not taking: Reported on 8/12/2021)     magnesium oxide (MAG-OX) 400 MG tablet Take 1 tablet (400 mg) by mouth every morning (Patient not taking: Reported on 8/12/2021)     oxyCODONE (ROXICODONE) 5 MG tablet Take 1 tablet (5 mg) by mouth every 6 hours as needed for pain (Patient not taking: Reported on 3/5/2021)     tacrolimus (GENERIC EQUIVALENT) 0.5 MG capsule HOLD (Patient not taking: Reported on 8/12/2021)     No current facility-administered medications for this visit.     There are no discontinued medications.     Physical Exam  No vitals for this telemedicine visit    GENERAL APPEARANCE: alert and no distress  HENT: no obvious abnormalities on appearance  RESP: breathing appears unremarkable with normal rate, no audible wheezing or cough and no apparent shortness of breath with conversation  MS: extremities normal - no gross deformities noted  SKIN: no apparent rash and normal skin tone  NEURO: speech is clear with no obvious neurological deficits  PSYCH: mentation appears normal and affect normal    Data     Renal Latest Ref Rng & Units 8/2/2021 7/7/2021 6/2/2021   Na 133 - 144 mmol/L 140 138 138   Na (external) 136 - 145 mmol/L - - -   K 3.4 - 5.3 mmol/L 4.5 4.2 4.0   K (external) 3.5 - 5.0 mmol/L - - -   Cl mmol/L 107 108 108   Cl (external) 98 - 107 mmol/L - - -   CO2 20 - 32 mmol/L 26 22 24   CO2 (external) 22 - 31 mmol/L  - - -   BUN 7 - 30 mg/dL 23 23 25   BUN (external) 8 - 22 mg/dL - - -   Cr mg/dL 1.10 0.91 1.00   Cr (external) 0.60 - 1 mg/dL - - -   Glucose 70 - 99 mg/dL 96 91 90   Glucose (external) 70 - 125 mg/dL - - -   Ca  8.5 - 10.1 mg/dL 9.7 9.7 9.7   Ca (external) 8.5 - 10.5 mg/dL - - -   Mg 1.6 - 2.3 mg/dL - - -   Mg (external) 1.8 - 2.6 mg/dL - - -     Bone Health Latest Ref Rng & Units 6/2/2021 3/3/2021 11/3/2020   Phos 2.5 - 4.5 mg/dL - - 2.6   Phos (external) 2.5 - 4.5 mg/dL - - -   PTHi 18 - 80 pg/mL - 251(H) -   PTHi (external) 18 - 80 pg/mL - - -   Vit D Def 20 - 75 ug/L 15(L) - -     Heme Latest Ref Rng & Units 8/2/2021 7/7/2021 6/2/2021   WBC 4.0 - 11.0 10e3/uL 7.9 7.2 7.0   WBC (external) 4.0 - 11.0 thou/uL - - -   Hgb 11.7 - 15.7 g/dL 12.6 12.2 12.9   Hgb (external) 12.0 - 16.0 g/dL - - -   Plt 150 - 450 10e3/uL 232 220 237   Plt (external) 140 - 440 thou/uL - - -   ABSOLUTE NEUTROPHIL 1.6 - 8.3 10e9/L - - -   ABSOLUTE NEUTROPHILS (EXTERNAL) 2.0 - 7.7 thou/uL - - -   ABSOLUTE LYMPHOCYTES 0.8 - 5.3 10e9/L - - -   ABSOLUTE LYMPHOCYTES (EXTERNAL) 0.8 - 4.4 thou/uL - - -   ABSOLUTE MONOCYTES 0.0 - 1.3 10e9/L - - -   ABSOLUTE MONOCYTES (EXTERNAL) 0.0 - 0.9 thou/uL - - -   ABSOLUTE EOSINOPHILS 0.0 - 0.7 10e9/L - - -   ABSOLUTE EOSINOPHILS (EXTERNAL) 0.0 - 0.4 thou/uL - - -   ABSOLUTE BASOPHILS 0.0 - 0.2 10e9/L - - -   ABSOLUTE BASOPHILS (EXTERNAL) 0.0 - 0.2 thou/uL - - -   ABS IMMATURE GRANULOCYTES 0 - 0.4 10e9/L - - -   ABSOLUTE NUCLEATED RBC - - - -     Liver Latest Ref Rng & Units 8/4/2020 2/3/2020 1/23/2020   AP 40 - 150 U/L 185(H) 126 210(H)   AP (external) 34 - 104 U/L - - -   TBili 0.2 - 1.3 mg/dL 0.7 0.6 1.2   DBili 0.0 - 0.2 mg/dL <0.1 0.1 -   ALT 0 - 50 U/L 19 20 20   ALT (external) 7 - 52 U/L - - -   AST 0 - 45 U/L 11 13 9   AST (external) 13 - 39 U/L - - -   Tot Protein 6.8 - 8.8 g/dL 7.9 7.4 8.1   Tot Protein (external) 6.4 - 8.9 g/dL - - -   Albumin 3.4 - 5.0 g/dL 4.0 3.7 4.5   Albumin (external) - - -  -     Pancreas Latest Ref Rng & Units 8/4/2020 2/3/2020 8/3/2019   A1C 0 - 5.6 % 5.5 5.7(H) 4.8     Iron studies Latest Ref Rng & Units 8/4/2020 2/17/2020 12/3/2019   Iron 35 - 180 ug/dL 64 95 47   Iron sat 15 - 46 % 32 42 23   Ferritin 12 - 150 ng/mL 1,065(H) 1,139(H) 1,003(H)   Ferritin (external) 10 - 291 ng/mL - - -     [facility] Txp Virology Latest Ref Rng & Units 7/7/2021 6/2/2021 5/3/2021   CVM DNA Quant - - - -   CMV QUANT IU/ML CMVND:CMV DNA Not Detected [IU]/mL - - -   LOG IU/ML OF CMVQNT <2.1 [Log:IU]/mL - - -   BK Spec - Plasma Plasma Plasma   BK Res BKNEG:BK Virus DNA Not Detected copies/mL BK Virus DNA Not Detected BK Virus DNA Not Detected BK Virus DNA Not Detected   BK Log <2.7 Log copies/mL Not Calculated Not Calculated Not Calculated   EBV VCA IGG ANTIBODY U/mL - - -   EBV CAPSID ANTIBODY IGG 0.0 - 0.8 AI - - -   EBV DNA COPIES/ML EBVNEG:EBV DNA Not Detected [Copies]/mL - - -   EBV DNA LOG OF COPIES <2.7 [Log:copies]/mL - - -   Hep B Surf - - - -        Recent Labs   Lab Test 06/02/21  0925 07/07/21  0920 08/02/21  0920   DOSTAC 6/1/21@2110 07/06/21 2110 8/1/2021   TACROL 6.1 5.1 7.6     Recent Labs   Lab Test 08/16/19  0807 09/03/19  0944   DOSMPA Not Provided 0840pm   MPACID 1.92 3.39   MPAG 149.2* 52.8

## 2021-08-17 DIAGNOSIS — Z94.0 KIDNEY REPLACED BY TRANSPLANT: Primary | ICD-10-CM

## 2021-08-17 RX ORDER — MYCOPHENOLIC ACID 180 MG/1
540 TABLET, DELAYED RELEASE ORAL 2 TIMES DAILY
Qty: 180 TABLET | Refills: 11 | Status: SHIPPED | OUTPATIENT
Start: 2021-08-17 | End: 2022-04-20

## 2021-08-19 ENCOUNTER — MYC MEDICAL ADVICE (OUTPATIENT)
Dept: UROLOGY | Facility: CLINIC | Age: 31
End: 2021-08-19

## 2021-08-19 DIAGNOSIS — N13.5 STRICTURE OR KINKING OF URETER: Primary | ICD-10-CM

## 2021-08-19 NOTE — TELEPHONE ENCOUNTER
----- Message from Nancy Lind RN sent at 8/19/2021  2:11 PM CDT -----  Please have her come in to lab to do this. Orders are placed. Thank you  ----- Message -----  From: Wally Britt MD  Sent: 8/9/2021   2:42 PM CDT  To: ERMELINDA Gutierres,  Can you please have her repeat a creatinine in 1 week.  Thanks, Sky.

## 2021-08-20 ENCOUNTER — OFFICE VISIT (OUTPATIENT)
Dept: FAMILY MEDICINE | Facility: CLINIC | Age: 31
End: 2021-08-20
Payer: MEDICARE

## 2021-08-20 VITALS
OXYGEN SATURATION: 99 % | SYSTOLIC BLOOD PRESSURE: 112 MMHG | TEMPERATURE: 98.2 F | BODY MASS INDEX: 28.08 KG/M2 | HEIGHT: 61 IN | HEART RATE: 81 BPM | RESPIRATION RATE: 20 BRPM | DIASTOLIC BLOOD PRESSURE: 74 MMHG | WEIGHT: 148.75 LBS

## 2021-08-20 DIAGNOSIS — Z00.00 ROUTINE GENERAL MEDICAL EXAMINATION AT A HEALTH CARE FACILITY: ICD-10-CM

## 2021-08-20 DIAGNOSIS — Z86.39 HISTORY OF HYPOTHYROIDISM: ICD-10-CM

## 2021-08-20 DIAGNOSIS — R06.83 SNORING: Primary | ICD-10-CM

## 2021-08-20 DIAGNOSIS — N13.5 STRICTURE OR KINKING OF URETER: ICD-10-CM

## 2021-08-20 DIAGNOSIS — Z13.220 SCREENING FOR HYPERLIPIDEMIA: ICD-10-CM

## 2021-08-20 LAB
CHOLEST SERPL-MCNC: 190 MG/DL
CREAT SERPL-MCNC: 0.99 MG/DL (ref 0.6–1.1)
FASTING STATUS PATIENT QL REPORTED: ABNORMAL
GFR SERPL CREATININE-BSD FRML MDRD: 78 ML/MIN/1.73M2
HDLC SERPL-MCNC: 38 MG/DL
LDLC SERPL CALC-MCNC: 128 MG/DL
TRIGL SERPL-MCNC: 120 MG/DL

## 2021-08-20 PROCEDURE — 99215 OFFICE O/P EST HI 40 MIN: CPT | Mod: 25 | Performed by: STUDENT IN AN ORGANIZED HEALTH CARE EDUCATION/TRAINING PROGRAM

## 2021-08-20 PROCEDURE — 36415 COLL VENOUS BLD VENIPUNCTURE: CPT | Performed by: STUDENT IN AN ORGANIZED HEALTH CARE EDUCATION/TRAINING PROGRAM

## 2021-08-20 PROCEDURE — 82565 ASSAY OF CREATININE: CPT | Performed by: STUDENT IN AN ORGANIZED HEALTH CARE EDUCATION/TRAINING PROGRAM

## 2021-08-20 PROCEDURE — G0009 ADMIN PNEUMOCOCCAL VACCINE: HCPCS | Performed by: STUDENT IN AN ORGANIZED HEALTH CARE EDUCATION/TRAINING PROGRAM

## 2021-08-20 PROCEDURE — 80061 LIPID PANEL: CPT | Performed by: STUDENT IN AN ORGANIZED HEALTH CARE EDUCATION/TRAINING PROGRAM

## 2021-08-20 PROCEDURE — 90670 PCV13 VACCINE IM: CPT | Performed by: STUDENT IN AN ORGANIZED HEALTH CARE EDUCATION/TRAINING PROGRAM

## 2021-08-20 ASSESSMENT — MIFFLIN-ST. JEOR: SCORE: 1336.23

## 2021-08-20 NOTE — PROGRESS NOTES
Female Physical Note      Patient concerned about possible sleep apnea:   has recorded her sleeping and there are times when she has gaps / breaks in her breathing.    stop bang score of 2. Yes to questions about snoring and elevated BP    Dad recently passed.  Had untreated FATOUMATA and needed tripple bypass recently.  Patient concerned about this     Arm pit lump:   Present since before transplant  Not changing   Present for 3 years.     History of renal transplant 2 years ago.  Follows with [facility] renal and transplant teams as well as urology for recent stenting.     Health maintenance:   Pap:  March 13 2020: Normal.  Due 2023    Needs booster shot moderna.        ROS:  As above.  Denies chest pain, nausea, vomiting, fevers, sweats, chills,     Sexually Active: Yes  Sexual concerns: would like to become pregnant.  Working with consultants and MFM to optimize health prior to attempt at pregnancy   Contraception:OCP-see med list  G0 P:0  See above last pap smear.  Normal.     Patient Active Problem List   Diagnosis     DVT of upper extremity (deep vein thrombosis) (H)     Seizure disorder (H)     Galactorrhea     Acquired hypothyroidism     Increased prolactin level     Hypomagnesemia     Infective endocarditis-history of.  1/2019.  resolved.      Aftercare following organ transplant     Immunosuppression (H)     Methemoglobinemia     Kidney replaced by transplant     Anxiety     Secondary renal hyperparathyroidism (H)     Vitamin D deficiency     CKD (chronic kidney disease) stage 3, GFR 30-59 ml/min     Stricture or kinking of ureter     Pyelonephritis     Diarrhea     Bicornate uterus     Normal Pap 3/2020.  repeat 3/2023     Prophylactic antibiotic     Hydronephrosis     Hydronephrosis of kidney transplant     Ureter, stricture     Urinary tract infection       Current Outpatient Medications   Medication Sig Dispense Refill     acetaminophen (TYLENOL) 325 MG tablet Take 2 tablets (650 mg) by mouth every 4 hours  as needed for mild pain 100 tablet 0     ammonium lactate (AMLACTIN) 12 % external cream Apply twice a day as needed to rough bumpy skin. 140 g 11     aspirin (ASA) 81 MG chewable tablet Take 81 mg by mouth daily       atorvastatin (LIPITOR) 10 MG tablet Take 1 tablet (10 mg) by mouth every morning 90 tablet 3     diphenhydrAMINE (BENADRYL) 25 MG capsule Take 1 capsule (25 mg) by mouth as needed for itching or allergies Take 1-2 tablets by mouth every 6 hours PRN itching/allergies 60 capsule 0     EPINEPHrine (ANY BX GENERIC EQUIV) 0.3 MG/0.3ML injection 2-pack Inject 0.3 mLs (0.3 mg) into the muscle as needed for anaphylaxis 2 each 1     fludrocortisone (FLORINEF) 0.1 MG tablet Take 1 tablet (0.1 mg) by mouth Every Mon, Wed, Fri Morning 90 tablet 1     hydrOXYzine (ATARAX) 10 MG tablet Take 1 tablet (10 mg) by mouth 3 times daily as needed for anxiety 20 tablet 0     lactobacillus rhamnosus, GG, (CULTURELL) capsule Take 1 capsule by mouth 2 times daily        levothyroxine (SYNTHROID/LEVOTHROID) 25 MCG tablet TAKE 1 TABLET(25MCG) BY MOUTH TUES, THUR, SAT AND SUN AND 1 A ND 1/ 2 TABLETS( 37.5 MCG) ON MON WED AND  tablet 3     MYFORTIC (BRAND) 180 MG EC tablet Take 3 tablets (540 mg) by mouth 2 times daily 180 tablet 11     norethindrone (MICRONOR) 0.35 MG tablet Do not restart until your follow up appointment with your surgeon. Discuss restart date at that appointment. 84 tablet 1     senna-docusate (SENOKOT-S/PERICOLACE) 8.6-50 MG tablet Take 1-2 tablets by mouth 2 times daily (Patient taking differently: Take 1-2 tablets by mouth 2 times daily as needed ) 30 tablet 0     simethicone (MYLICON) 125 MG chewable tablet Take 1 tablet (125 mg) by mouth 4 times daily as needed for intestinal gas 120 tablet 0     tacrolimus (GENERIC EQUIVALENT) 1 MG capsule Take 3 capsules (3 mg) by mouth 2 times daily 180 capsule 11     cinacalcet (SENSIPAR) 30 MG tablet Take 1 tablet (30 mg) by mouth daily 30 tablet 3      ciprofloxacin (CIPRO) 250 MG tablet Take 1 tablet (250 mg) by mouth 2 times daily (Patient not taking: Reported on 8/12/2021) 10 tablet 0     clindamycin (CLEOCIN) 2 % vaginal cream  (Patient not taking: Reported on 8/12/2021)       magnesium oxide (MAG-OX) 400 MG tablet Take 1 tablet (400 mg) by mouth every morning (Patient not taking: Reported on 8/12/2021) 180 tablet 0     oxyCODONE (ROXICODONE) 5 MG tablet Take 1 tablet (5 mg) by mouth every 6 hours as needed for pain (Patient not taking: Reported on 3/5/2021) 6 tablet 0     tacrolimus (GENERIC EQUIVALENT) 0.5 MG capsule HOLD (Patient not taking: Reported on 8/12/2021) 60 capsule 11       Past Medical History:   Diagnosis Date     Anemia in chronic kidney disease      Dialysis patient (H)      End stage renal disease on dialysis (H)      Endocarditis      History of blood transfusion      History of DVT (deep vein thrombosis) 2014     History of seizure 2014     Hypertension      Hypothyroid      Kidney transplanted 08/03/2019    DCD DDKT. Induction with thymo 6mg/kg.     Pituitary adenoma (H)      Pyelonephritis of transplanted kidney 01/23/2020        Family History     Problem (# of Occurrences) Relation (Name,Age of Onset)    Cerebrovascular Disease (2) Sister, Father    Diabetes (1) Sister    Heart Disease (1) Father    Hypertension (1) Sister    Kidney Disease (2) Mother, Sister    Kidney failure (1) Mother       Negative family history of: Coronary Artery Disease, Breast Cancer, Cancer - colorectal, Ovarian Cancer, Prostate Cancer, Other Cancer, Asthma, Anesthesia Reaction, Deep Vein Thrombosis (DVT), Melanoma, Skin Cancer          Problem List Medication List and Allergy List were reviewed.    Patient is an established patient of this clinic..    Social History     Tobacco Use     Smoking status: Never Smoker     Smokeless tobacco: Never Used   Substance Use Topics     Alcohol use: No     Alcohol/week: 0.0 standard drinks       Children ?  "no    Has anyone hurt you physically, for example by pushing, hitting, slapping or kicking you or forcing you to have sex? Denies  Do you feel threatened or controlled by a partner, ex-partner or anyone in your life? Denies    RISK BEHAVIORS AND HEALTHY HABITS:  Tobacco Use/Smoking: None  Illicit Drug Use: None  Do you use alcohol? No  Diet (5-7 servings of fruits/veg daily): Yes   Exercise (30 min accumulated most days):Yes  Dental Care: Yes   Calcium 1500 mg/d:  Yes  Seat Belt Use: Yes     Recent cholesterol screens, normal    Immunization History   Administered Date(s) Administered     COVID-19,PF,Moderna 03/20/2021, 04/17/2021     DTAP (<7y) 09/22/1997     Flu, Unspecified 11/01/2018, 09/03/2020     HEPA 12/31/2008, 09/04/2009     HPV 12/31/2008, 09/04/2009, 06/10/2010     HPV Quadrivalent 12/31/2008, 09/04/2009, 06/10/2010     Hep B, Peds or Adolescent 09/25/1997, 04/22/2005, 08/03/2005     HepA-Peds, Unspecified 09/04/2009     HepA-ped 2 Dose 12/31/2008     HepB 09/02/1997, 04/22/2005, 08/03/2005     Influenza (IIV3) PF 12/31/2008, 12/23/2013, 10/15/2015, 09/22/2016, 09/25/2017, 09/28/2018     Influenza Vaccine IM > 6 months Valent IIV4 09/19/2014, 10/07/2019, 09/03/2020     Influenza Vaccine Im 4yrs+ 4 Valent CCIIV4 09/03/2020     MMR 04/22/2005     Mantoux Tuberculin Skin Test 12/12/2013     Meningococcal (Menactra ) 09/04/2009     Meningococcal (Menomune ) 09/04/2009     OPV, trivalent, live 09/22/1997     Pneumococcal 23 valent 12/30/2013, 12/28/2018     Poliovirus, inactivated (IPV) 09/22/1997     TD (ADULT, 7+) 04/22/2005     TDAP Vaccine (Adacel) 09/04/2009     Tdap (Adacel,Boostrix) 09/04/2009     Varicella 12/31/2008, 09/04/2009     EXAMINATION:   /74   Pulse 81   Temp 98.2  F (36.8  C)   Resp 20   Ht 1.54 m (5' 0.63\")   Wt 67.5 kg (148 lb 12 oz)   SpO2 99%   BMI 28.45 kg/m    GEN: NAD  HEENT: head atraumatic, normocephalic, moist mucous membranes, eyes anicteric  CV: RRR w/o M/R/G  PULM: " CTAB  ABD: soft, non-tender, no appreciable masses, no guarding, BS present  Neuro: alert and oriented x 3, CN II-XII intact, normal gross motor movements  Psych: appropriate  Ext: no peripheral edema   Neck circumference was 39cm.    ASSESSMENT:  1. Snoring-family history of sleep apnea.  Audio recording of possible breaks in breathing during sleep.  Borderline neck circumference.   - SLEEP EVALUATION & MANAGEMENT REFERRAL - ADULT -; Future    2. Screening for hyperlipidemia-discussed previous normal cholestrol screens.  Patient would like one more screen and then I recommend we stop checking as frequently.   - Lipid Profile; Future  - Lipid Profile      4. Routine general medical examination at a health care facility  -not due for pap today    5. Stricture or kinking of ureter-managed by urology at this time  - Creatinine    6. Forms: patient immune suppressed following kidney transplant.  Due for booster.  Completed forms today to support application for long term leave due to immune suppressed status and risk of severe complications if she were to contract COVID 19 infection.

## 2021-08-26 ENCOUNTER — TELEPHONE (OUTPATIENT)
Dept: TRANSPLANT | Facility: CLINIC | Age: 31
End: 2021-08-26

## 2021-08-26 NOTE — TELEPHONE ENCOUNTER
See notes below. Patient notified that she needs to notify transplant office PRIOR to trying to get pregnant so transplant team can adjust her meds and stop the MPA.     Patient v/u of instructions.           ----- Message from Gelacio Mcintyre MD sent at 8/13/2021  7:01 AM CDT -----  Regarding: FW: hydro  Please inform the patient that Dr. Britt was ok moving forward with pregnancy and for her to let us know when she wants to switch to AZA. I wrote my transition plan in my note. She should be aware that she needs to be off MPA for 6 weeks prior to proceeding with pregnancy  ----- Message -----  From: Wally Britt MD  Sent: 8/12/2021  10:40 PM CDT  To: Gelacio Mcintyre MD  Subject: RE: hydro                                        Gelacio,  I think your message was spot on.  There is increased risk, but I think we can likely manage the situation.  I know she is very interested in pregnancy.  I think she understands the risks and situation and it is reasonable for her to try for a pregnancy.  Thanks, Sky.  ----- Message -----  From: Gelacio Mcintyre MD  Sent: 8/12/2021   3:03 PM CDT  To: Wally Britt MD, Angelina Garvin RN  Subject: hydro                                            Dr. Britt,     I saw Ms. Wagner today in clinic and wanted to know your thoughts about her hydro. She doesn't currently have a stent and has had stable moderate hydro for a few months now. I told her I thought that if she became pregnant she would be at increased risk for worsening hydro with extrinsic compression from the gravid uterus and that it could possibly be managed by ureteral stent vs perc neph and she was ok with that. I wanted to know if from your end it was ok for her to try to get pregnant before I start changing her immunosuppression in anticipation of pregnancy.     Thanks,  Gelacio Mcintyre

## 2021-09-02 NOTE — TELEPHONE ENCOUNTER
Lexapro was renewed  Spoke to patient who states that she has been having 3-4 episodes of diarrhea daily for the past 2 days.  Patient verbalizes understanding to increase Sodium Bicarb dose to 1950 mg BID.

## 2021-09-03 ENCOUNTER — MYC MEDICAL ADVICE (OUTPATIENT)
Dept: FAMILY MEDICINE | Facility: CLINIC | Age: 31
End: 2021-09-03

## 2021-09-07 ENCOUNTER — IMMUNIZATION (OUTPATIENT)
Dept: FAMILY MEDICINE | Facility: CLINIC | Age: 31
End: 2021-09-07
Payer: MEDICARE

## 2021-09-07 ENCOUNTER — LAB (OUTPATIENT)
Dept: LAB | Facility: CLINIC | Age: 31
End: 2021-09-07

## 2021-09-07 DIAGNOSIS — N13.5 STRICTURE OR KINKING OF URETER: ICD-10-CM

## 2021-09-07 LAB
ANION GAP SERPL CALCULATED.3IONS-SCNC: 12 MMOL/L (ref 5–18)
BUN SERPL-MCNC: 21 MG/DL (ref 8–22)
CALCIUM SERPL-MCNC: 10.1 MG/DL (ref 8.5–10.5)
CHLORIDE BLD-SCNC: 106 MMOL/L (ref 98–107)
CO2 SERPL-SCNC: 20 MMOL/L (ref 22–31)
CREAT SERPL-MCNC: 0.91 MG/DL (ref 0.6–1.1)
ERYTHROCYTE [DISTWIDTH] IN BLOOD BY AUTOMATED COUNT: 13 % (ref 10–15)
GFR SERPL CREATININE-BSD FRML MDRD: 86 ML/MIN/1.73M2
GLUCOSE BLD-MCNC: 94 MG/DL (ref 70–125)
HCT VFR BLD AUTO: 37.9 % (ref 35–47)
HGB BLD-MCNC: 12.2 G/DL (ref 11.7–15.7)
MCH RBC QN AUTO: 28.7 PG (ref 26.5–33)
MCHC RBC AUTO-ENTMCNC: 32.2 G/DL (ref 31.5–36.5)
MCV RBC AUTO: 89 FL (ref 78–100)
PLATELET # BLD AUTO: 225 10E3/UL (ref 150–450)
POTASSIUM BLD-SCNC: 4.4 MMOL/L (ref 3.5–5)
RBC # BLD AUTO: 4.25 10E6/UL (ref 3.8–5.2)
SODIUM SERPL-SCNC: 138 MMOL/L (ref 136–145)
WBC # BLD AUTO: 8.2 10E3/UL (ref 4–11)

## 2021-09-07 PROCEDURE — 87799 DETECT AGENT NOS DNA QUANT: CPT

## 2021-09-07 PROCEDURE — 80048 BASIC METABOLIC PNL TOTAL CA: CPT

## 2021-09-07 PROCEDURE — 80197 ASSAY OF TACROLIMUS: CPT

## 2021-09-07 PROCEDURE — 0013A PR COVID VAC MODERNA 100 MCG/0.5 ML IM: CPT

## 2021-09-07 PROCEDURE — 85027 COMPLETE CBC AUTOMATED: CPT

## 2021-09-07 PROCEDURE — 91301 PR COVID VAC MODERNA 100 MCG/0.5 ML IM: CPT

## 2021-09-07 PROCEDURE — 36415 COLL VENOUS BLD VENIPUNCTURE: CPT

## 2021-09-08 ENCOUNTER — MYC MEDICAL ADVICE (OUTPATIENT)
Dept: FAMILY MEDICINE | Facility: CLINIC | Age: 31
End: 2021-09-08

## 2021-09-08 LAB
BKV DNA # SPEC NAA+PROBE: NOT DETECTED COPIES/ML
TACROLIMUS BLD-MCNC: 6.3 UG/L (ref 5–15)
TME LAST DOSE: NORMAL H
TME LAST DOSE: NORMAL H

## 2021-09-14 DIAGNOSIS — Z94.0 KIDNEY REPLACED BY TRANSPLANT: Primary | ICD-10-CM

## 2021-09-14 RX ORDER — TACROLIMUS 1 MG/1
3 CAPSULE ORAL 2 TIMES DAILY
Qty: 180 CAPSULE | Refills: 11 | Status: SHIPPED | OUTPATIENT
Start: 2021-09-14 | End: 2022-09-09

## 2021-09-19 ENCOUNTER — HEALTH MAINTENANCE LETTER (OUTPATIENT)
Age: 31
End: 2021-09-19

## 2021-09-24 ENCOUNTER — TELEPHONE (OUTPATIENT)
Dept: FAMILY MEDICINE | Facility: CLINIC | Age: 31
End: 2021-09-24

## 2021-09-24 DIAGNOSIS — R35.0 URINARY FREQUENCY: Primary | ICD-10-CM

## 2021-09-24 DIAGNOSIS — R39.15 URINARY URGENCY: ICD-10-CM

## 2021-09-24 RX ORDER — CIPROFLOXACIN 250 MG/1
250 TABLET, FILM COATED ORAL 2 TIMES DAILY
Qty: 10 TABLET | Refills: 0 | Status: SHIPPED | OUTPATIENT
Start: 2021-09-24 | End: 2021-09-29

## 2021-09-24 NOTE — TELEPHONE ENCOUNTER
"Patient calling after hours service line:    \"Thinks having a UTI\"    Previously had one in July. Symptoms started 3 hours ago. Urgency and frequency. Also having chills. No burning when urinates. No hematuria. No back pain. Last time Urology gave her ciprofloxacin.     Had a kidney transplant on right side.     Follows with Urology and ID and was given ciprofloxacin 250 mg bid by ID.    A/P:  1. Urinary frequency  2. Urinary urgency  Will send prescription for abx for recurrent UTI. Counseld patient that she should follow up on Monday with us in clinic or with Urology. We will not be able to obtain UA or urine cultures as clinic is closed and would like to keep immunocompromised patient out of urgent cares or ED.  - ciprofloxacin (CIPRO) 250 MG tablet; Take 1 tablet (250 mg) by mouth 2 times daily for 5 days  Dispense: 10 tablet; Refill: 0      Discussed with Dr. Ruben Haas MD  [facility] Family Medicine Resident PGY-3  [facility]        "

## 2021-10-01 ASSESSMENT — SLEEP AND FATIGUE QUESTIONNAIRES
HOW LIKELY ARE YOU TO NOD OFF OR FALL ASLEEP WHILE SITTING AND READING: WOULD NEVER DOZE
HOW LIKELY ARE YOU TO NOD OFF OR FALL ASLEEP WHILE SITTING QUIETLY AFTER LUNCH WITHOUT ALCOHOL: SLIGHT CHANCE OF DOZING
HOW LIKELY ARE YOU TO NOD OFF OR FALL ASLEEP WHILE WATCHING TV: SLIGHT CHANCE OF DOZING
HOW LIKELY ARE YOU TO NOD OFF OR FALL ASLEEP WHILE SITTING AND TALKING TO SOMEONE: WOULD NEVER DOZE
HOW LIKELY ARE YOU TO NOD OFF OR FALL ASLEEP IN A CAR, WHILE STOPPED FOR A FEW MINUTES IN TRAFFIC: WOULD NEVER DOZE
HOW LIKELY ARE YOU TO NOD OFF OR FALL ASLEEP WHEN YOU ARE A PASSENGER IN A CAR FOR AN HOUR WITHOUT A BREAK: SLIGHT CHANCE OF DOZING
HOW LIKELY ARE YOU TO NOD OFF OR FALL ASLEEP WHILE SITTING INACTIVE IN A PUBLIC PLACE: WOULD NEVER DOZE
HOW LIKELY ARE YOU TO NOD OFF OR FALL ASLEEP WHILE LYING DOWN TO REST IN THE AFTERNOON WHEN CIRCUMSTANCES PERMIT: SLIGHT CHANCE OF DOZING

## 2021-10-01 ASSESSMENT — ENCOUNTER SYMPTOMS
FLANK PAIN: 0
ORTHOPNEA: 0
SYNCOPE: 0
LIGHT-HEADEDNESS: 1
HYPOTENSION: 1
HEMATURIA: 0
LEG PAIN: 0
HYPERTENSION: 0
EXERCISE INTOLERANCE: 0
SLEEP DISTURBANCES DUE TO BREATHING: 0
DYSURIA: 1
PALPITATIONS: 0

## 2021-10-05 DIAGNOSIS — Z94.0 KIDNEY TRANSPLANTED: ICD-10-CM

## 2021-10-05 RX ORDER — CINACALCET 30 MG/1
30 TABLET, FILM COATED ORAL DAILY
Qty: 30 TABLET | Refills: 3 | Status: SHIPPED | OUTPATIENT
Start: 2021-10-05 | End: 2022-04-20

## 2021-10-07 ENCOUNTER — VIRTUAL VISIT (OUTPATIENT)
Dept: SLEEP MEDICINE | Facility: CLINIC | Age: 31
End: 2021-10-07
Attending: STUDENT IN AN ORGANIZED HEALTH CARE EDUCATION/TRAINING PROGRAM
Payer: MEDICARE

## 2021-10-07 VITALS — HEIGHT: 61 IN | WEIGHT: 148 LBS | BODY MASS INDEX: 27.94 KG/M2

## 2021-10-07 DIAGNOSIS — R06.81 WITNESSED EPISODE OF APNEA: Primary | ICD-10-CM

## 2021-10-07 DIAGNOSIS — R06.83 SNORING: ICD-10-CM

## 2021-10-07 DIAGNOSIS — R51.9 SLEEP RELATED HEADACHES: ICD-10-CM

## 2021-10-07 DIAGNOSIS — R40.0 INTERMITTENT DROWSINESS: ICD-10-CM

## 2021-10-07 PROCEDURE — 99203 OFFICE O/P NEW LOW 30 MIN: CPT | Mod: 95 | Performed by: INTERNAL MEDICINE

## 2021-10-07 ASSESSMENT — MIFFLIN-ST. JEOR: SCORE: 1330.76

## 2021-10-07 NOTE — PROGRESS NOTES
Mona is a 30 year old who is being evaluated via a billable video visit.      How would you like to obtain your AVS? MyChart  If the video visit is dropped, the invitation should be resent by: Text to cell phone: [phone]  Will anyone else be joining your video visit? No    Does patient have any form of state insurance? YES    Do you have wifi? YES  Do you have a smart phone? YES   Can you download an stefany on your phone comfortably with out assistance? YES  Can you watch a Youtube video? YES    Tonja BUTLER CMA, SLEEP MEDICINE, 10/7/2021 11:50 AM    Video Start Time: 1:25 PM  Video-Visit Details    Type of service:  Video Visit    Video End Time:1:40 PM    Originating Location (pt. Location): Home    Distant Location (provider location):  [facility]     Platform used for Video Visit: [employer]     Answers for HPI/ROS submitted by the patient on 10/1/2021  General Symptoms: No  Skin Symptoms: No  HENT Symptoms: No  EYE SYMPTOMS: No  HEART SYMPTOMS: Yes  LUNG SYMPTOMS: No  INTESTINAL SYMPTOMS: No  URINARY SYMPTOMS: Yes  GYNECOLOGIC SYMPTOMS: No  BREAST SYMPTOMS: No  SKELETAL SYMPTOMS: No  BLOOD SYMPTOMS: No  NERVOUS SYSTEM SYMPTOMS: No  MENTAL HEALTH SYMPTOMS: No  Chest pain or pressure: No  Fast or irregular heartbeat: No  Pain in legs with walking: No  Trouble breathing while lying down: No  Fingers or toes appear blue: No  High blood pressure: No  Low blood pressure: Yes  Fainting: No  Murmurs: Yes  Pacemaker: No  Varicose veins: No  Edema or swelling: No  Wake up at night with shortness of breath: No  Light-headedness: Yes  Exercise intolerance: No  Trouble holding urine or incontinence: No  Pain or burning: Yes  Trouble starting or stopping: No  Increased frequency of urination: Yes  Blood in urine: No  Decreased frequency of urination: No  Frequent nighttime urination: No  Flank pain: No    Additional 15 minutes was spent performing the following:    -Preparing to see the  patient  -Obtaining and/or reviewing separately obtained history   -Ordering medications, tests, or procedures   -Documenting clinical information in the electronic or other health record     Thank you for the opportunity to participate in the care of  Mona Wagner.    Assessment and Plan:    In summary Mona Wagner is a 30 year old year old female here for sleep disturbance.  1. Witness apnea/Intermittent drowsiness/Snoring/Sleep related headaches   Mona Wagner has high risk for obstructive sleep apnea based on the history of witness apnea, intermittent drowsiness, snoring and a crowded airway. I educated the patient on the underlying pathophysiology of obstructive sleep apnea. We reviewed the risks associated with sleep apnea, including increased cardiovascular risk and overall death. We talked about treatments briefly. I recommend getting an baseline nocturnal polysomnography. The patient should return to the clinic to discuss results and treatment option in a patient-centered approach.    History of present illness:    She is a 30 year old female who comes to the virtual clinic with a chief complaints of witnessed apneas been going on for many years.  The patient showed me a video that was taken when she was asleep, and it showed that she was having significant pauses in her breathing during sleep followed by loud snoring.  The patient also complains of intermittent drowsiness during the day.  She also frequently wakes up with headaches.     Ideal Sleep-Wake Cycle(devoid of societal pressure):    Patient would try to initiate sleep at around 1 AM with a sleep latency of less than 15 minutes. The patient would have 3 awakenings. Final wake up time is around 10 AM.    ALEXIS:  ALEXIS Total Score: 15  Total score - Lyndon: 4 (10/1/2021 10:34 PM)    Patient told to return in one week after the sleep study is interpreted.    Patient Active Problem List   Diagnosis     DVT of upper extremity (deep vein thrombosis) (H)      Seizure disorder (H)     Galactorrhea     Acquired hypothyroidism     Increased prolactin level     Hypomagnesemia     Infective endocarditis-history of.  1/2019.  resolved.      Aftercare following organ transplant     Immunosuppression (H)     Methemoglobinemia     Kidney replaced by transplant     Anxiety     Secondary renal hyperparathyroidism (H)     Vitamin D deficiency     CKD (chronic kidney disease) stage 3, GFR 30-59 ml/min (H)     Stricture or kinking of ureter     Pyelonephritis     Diarrhea     Bicornate uterus     Normal Pap 3/2020.  repeat 3/2023     Prophylactic antibiotic     Hydronephrosis     Hydronephrosis of kidney transplant     Ureter, stricture     Urinary tract infection     Past Medical History:   Diagnosis Date     Anemia in chronic kidney disease      Dialysis patient (H)      End stage renal disease on dialysis (H)      Endocarditis      History of blood transfusion      History of DVT (deep vein thrombosis) 2014     History of seizure 2014     Hypertension      Hypothyroid      Kidney transplanted 08/03/2019    DCD DDKT. Induction with thymo 6mg/kg.     Pituitary adenoma (H)      Pyelonephritis of transplanted kidney 01/23/2020     Past Surgical History:   Procedure Laterality Date     BENCH KIDNEY N/A 8/3/2019    Procedure: BACKBENCH PREPARATION, ALLOGRAFT, KIDNEY;  Surgeon: Dorian Johnson MD;  Location: [facility] OR     COMBINED CYSTOSCOPY, RETROGRADES, EXCHANGE STENT URETER(S) Right 11/23/2020    Procedure: CYSTOSCOPY, CYSOTGRAM, WITH RETROGRADE PYELOGRAM, ureteroscopy,  AND URETERAL STENT;  Surgeon: Wally Britt MD;  Location: [facility] OR     COMBINED CYSTOSCOPY, RETROGRADES, URETEROSCOPY, LASER HOLMIUM LITHOTRIPSY URETER(S), INSERT STENT N/A 12/6/2019    Procedure: CYSTOURETEROSCOPY, WITH RETROGRADE PYELOGRAM of transplant kidney, STENT INSERTION, Laser on standby;  Surgeon: Wally Britt MD;  Location: [facility] OR     CREATE FISTULA ARTERIOVENOUS UPPER EXTREMITY  1/2/2014     Procedure: CREATE FISTULA ARTERIOVENOUS UPPER EXTREMITY;  Left Wrist Arteriovenous Fistula Placement;  Surgeon: Shashi Castro MD;  Location: [facility] OR     CREATE FISTULA ARTERIOVENOUS UPPER EXTREMITY       CYSTOSCOPY, RETROGRADES, INSERT STENT URETER(S), COMBINED N/A 2020    Procedure: CYSTOSCOPY, WITH RETROGRADE PYELOGRAM, CYSTOGRAM AND URETERAL STENT INSERTION - TRANSPLANT KIDNEY;  Surgeon: Wally Britt MD;  Location: [facility] OR     IR CEREBRAL ANGIOGRAM  2014     PERCUTANEOUS BIOPSY KIDNEY Right 2019    Procedure: Right Kidney Biopsy;  Surgeon: Deny Rios MD;  Location: [facility] OR     RASTA/DIALYSIS CATHETER  12/10/2013          TRANSPLANT KIDNEY RECIPIENT  DONOR N/A 8/3/2019    Procedure: TRANSPLANT, KIDNEY, RECIPIENT,  DONOR with Ureteral Stent Placement;  Surgeon: Dorian Johnson MD;  Location: [facility] OR     Current Outpatient Medications   Medication Sig Dispense Refill     acetaminophen (TYLENOL) 325 MG tablet Take 2 tablets (650 mg) by mouth every 4 hours as needed for mild pain 100 tablet 0     ammonium lactate (AMLACTIN) 12 % external cream Apply twice a day as needed to rough bumpy skin. 140 g 11     aspirin (ASA) 81 MG chewable tablet Take 81 mg by mouth daily       atorvastatin (LIPITOR) 10 MG tablet Take 1 tablet (10 mg) by mouth every morning 90 tablet 3     cinacalcet (SENSIPAR) 30 MG tablet Take 1 tablet (30 mg) by mouth daily 30 tablet 3     clindamycin (CLEOCIN) 2 % vaginal cream        diphenhydrAMINE (BENADRYL) 25 MG capsule Take 1 capsule (25 mg) by mouth as needed for itching or allergies Take 1-2 tablets by mouth every 6 hours PRN itching/allergies 60 capsule 0     EPINEPHrine (ANY BX GENERIC EQUIV) 0.3 MG/0.3ML injection 2-pack Inject 0.3 mLs (0.3 mg) into the muscle as needed for anaphylaxis 2 each 1     fludrocortisone (FLORINEF) 0.1 MG tablet Take 1 tablet (0.1 mg) by mouth Every Mon, Wed, Fri Morning 90 tablet 1     hydrOXYzine (ATARAX) 10 MG  tablet Take 1 tablet (10 mg) by mouth 3 times daily as needed for anxiety 20 tablet 0     lactobacillus rhamnosus, GG, (CULTURELL) capsule Take 1 capsule by mouth 2 times daily        levothyroxine (SYNTHROID/LEVOTHROID) 25 MCG tablet TAKE 1 TABLET(25MCG) BY MOUTH TUES, THUR, SAT AND SUN AND 1 A ND 1/ 2 TABLETS( 37.5 MCG) ON MON WED AND  tablet 3     magnesium oxide (MAG-OX) 400 MG tablet Take 1 tablet (400 mg) by mouth every morning 180 tablet 0     MYFORTIC (BRAND) 180 MG EC tablet Take 3 tablets (540 mg) by mouth 2 times daily 180 tablet 11     norethindrone (MICRONOR) 0.35 MG tablet Do not restart until your follow up appointment with your surgeon. Discuss restart date at that appointment. 84 tablet 1     oxyCODONE (ROXICODONE) 5 MG tablet Take 1 tablet (5 mg) by mouth every 6 hours as needed for pain 6 tablet 0     senna-docusate (SENOKOT-S/PERICOLACE) 8.6-50 MG tablet Take 1-2 tablets by mouth 2 times daily (Patient taking differently: Take 1-2 tablets by mouth 2 times daily as needed ) 30 tablet 0     simethicone (MYLICON) 125 MG chewable tablet Take 1 tablet (125 mg) by mouth 4 times daily as needed for intestinal gas 120 tablet 0     tacrolimus (GENERIC EQUIVALENT) 0.5 MG capsule HOLD 60 capsule 11     tacrolimus (GENERIC EQUIVALENT) 1 MG capsule Take 3 capsules (3 mg) by mouth 2 times daily 180 capsule 11     Contrast dye, Lisinopril, Nitrous oxide, Chlorhexidine, Sulfa drugs, Dapsone, Furosemide, Metrogel [metronidazole], Azithromycin, and Cefuroxime  Social History     Socioeconomic History     Marital status:      Spouse name: Not on file     Number of children: 0     Years of education: Not on file     Highest education level: Not on file   Occupational History     Occupation: Pharmacy Technician   Tobacco Use     Smoking status: Never Smoker     Smokeless tobacco: Never Used   Substance and Sexual Activity     Alcohol use: No     Alcohol/week: 0.0 standard drinks     Drug use: No      Sexual activity: Yes     Partners: Male   Other Topics Concern     Parent/sibling w/ CABG, MI or angioplasty before 65F 55M? Not Asked   Social History Narrative    Date of Service:5/15/2013     Name: Mona VALDOVINOS (Month, Day, Year of birth): 1990     MRN: [id number]     New Patient: Yes    Preferred Language: English     Needed: No    County of Residence: [place]    Marital Status:     Household size: 8    Number of Dependents:0     Pregnant: 0    Average Monthly Income: $ 600    Citizenship Status: Citizen    Gave Pt [employer] and [employer] applications     Social Determinants of Health     Financial Resource Strain:      Difficulty of Paying Living Expenses:    Food Insecurity:      Worried About Running Out of Food in the Last Year:      Ran Out of Food in the Last Year:    Transportation Needs:      Lack of Transportation (Medical):      Lack of Transportation (Non-Medical):    Physical Activity:      Days of Exercise per Week:      Minutes of Exercise per Session:    Stress:      Feeling of Stress :    Social Connections:      Frequency of Communication with Friends and Family:      Frequency of Social Gatherings with Friends and Family:      Attends Denominational Services:      Active Member of Clubs or Organizations:      Attends Club or Organization Meetings:      Marital Status:    Intimate Partner Violence:      Fear of Current or Ex-Partner:      Emotionally Abused:      Physically Abused:      Sexually Abused:      Family History   Problem Relation Age of Onset     Hypertension Sister      Diabetes Sister      Cerebrovascular Disease Sister      Kidney Disease Mother      Kidney failure Mother      Kidney Disease Sister      Cerebrovascular Disease Father      Heart Disease Father      Coronary Artery Disease No family hx of      Breast Cancer No family hx of      Cancer - colorectal No family hx of      Ovarian Cancer No family hx of      Prostate Cancer No family hx of      Other  Cancer No family hx of      Asthma No family hx of      Anesthesia Reaction No family hx of      Deep Vein Thrombosis (DVT) No family hx of      Melanoma No family hx of      Skin Cancer No family hx of         Physical Exam:  GEN: NAD,   Head: Normocephalic.  EYES: EOMI  ENT: Oropharynx is clear, Acosta class 4+ airway.   Psych: normal mood, normal affect  Snore test:(+)     Labs/Studies:     No results found for: PH, PHARTERIAL, PO2, [id number], SAT, PCO2, HCO3, BASEEXCESS, JOSE, BEB  Lab Results   Component Value Date    TSH 2.14 06/02/2021    TSH 2.13 04/22/2020     Lab Results   Component Value Date    GLC 94 09/07/2021    GLC 96 08/02/2021     Lab Results   Component Value Date    HGB 12.2 09/07/2021    HGB 12.6 08/02/2021     Lab Results   Component Value Date    BUN 21 09/07/2021    BUN 23 08/02/2021    CR 0.91 09/07/2021    CR 0.99 08/20/2021     Lab Results   Component Value Date    AST 11 08/04/2020    AST 13 02/03/2020    ALT 19 08/04/2020    ALT 20 02/03/2020    ALKPHOS 185 (H) 08/04/2020    ALKPHOS 126 02/03/2020    BILITOTAL 0.7 08/04/2020    BILITOTAL 0.6 02/03/2020    BILICONJ 0.0 12/10/2013    BILIDIRECT 0.2 12/03/2013     No results found for: UAMP, UBARB, BENZODIAZEUR, UCANN, UCOC, OPIT, UPCP    Recent Labs   Lab Test 09/07/21  0910 08/20/21  1045 08/02/21  0920 08/02/21  0920     --   --  140   POTASSIUM 4.4  --   --  4.5   CHLORIDE 106  --   --  107   CO2 20*  --   --  26   ANIONGAP 12  --   --  7   GLC 94  --   --  96   BUN 21  --   --  23   CR 0.91 0.99   < > 1.10   SHMAIR 10.1  --   --  9.7    < > = values in this interval not displayed.       Ferritin   Date Value Ref Range Status   08/04/2020 1,065 (H) 12 - 150 ng/mL Final       Jas yLon DO  Board Certified in Internal Medicine and Sleep Medicine    (Note created with Dragon voice recognition and unintended spelling errors and word substitutions may occur)

## 2021-10-07 NOTE — PATIENT INSTRUCTIONS
Your BMI is Body mass index is 28.43 kg/m .  Weight management is a personal decision.  If you are interested in exploring weight loss strategies, the following discussion covers the approaches that may be successful. Body mass index (BMI) is one way to tell whether you are at a healthy weight, overweight, or obese. It measures your weight in relation to your height.  A BMI of 18.5 to 24.9 is in the healthy range. A person with a BMI of 25 to 29.9 is considered overweight, and someone with a BMI of 30 or greater is considered obese. More than two-thirds of American adults are considered overweight or obese.  Being overweight or obese increases the risk for further weight gain. Excess weight may lead to heart disease and diabetes.  Creating and following plans for healthy eating and physical activity may help you improve your health.  Weight control is part of healthy lifestyle and includes exercise, emotional health, and healthy eating habits. Careful eating habits lifelong are the mainstay of weight control. Though there are significant health benefits from weight loss, long-term weight loss with diet alone may be very difficult to achieve- studies show long-term success with dietary management in less than 10% of people. Attaining a healthy weight may be especially difficult to achieve in those with severe obesity. In some cases, medications, devices and surgical management might be considered.  What can you do?  If you are overweight or obese and are interested in methods for weight loss, you should discuss this with your provider.     Consider reducing daily calorie intake by 500 calories.     Keep a food journal.     Avoiding skipping meals, consider cutting portions instead.    Diet combined with exercise helps maintain muscle while optimizing fat loss. Strength training is particularly important for building and maintaining muscle mass. Exercise helps reduce stress, increase energy, and improves fitness.  Increasing exercise without diet control, however, may not burn enough calories to loose weight.       Start walking three days a week 10-20 minutes at a time    Work towards walking thirty minutes five days a week     Eventually, increase the speed of your walking for 1-2 minutes at time    In addition, we recommend that you review healthy lifestyles and methods for weight loss available through the National Institutes of Health patient information sites:  http://win.niddk.nih.gov/publications/index.htm    And look into health and wellness programs that may be available through your health insurance provider, employer, local community center, or tatiana club.    Patient education: What is a sleep study?     What is a sleep study? -- A sleep study is a test that measures how well you sleep and checks for sleep problems. For some sleep studies, you stay overnight in a sleep lab at a hospital or sleep center.     What happens during a sleep study? -- Before you go to sleep, a technician attaches small, sticky patches called  electrodes  to your head, chest, and legs. He or she will also place a small tube beneath your nose and might wrap 1 or 2 belts around your chest.   Each of these items has wires that connect to monitors. The monitors record your movement, brain activity, breathing, and other body functions while you sleep.  If you have a history of trouble falling asleep, your doctor might prescribe a medicine to help you fall asleep in the lab. If you have never taken the medicine before, your doctor might ask you take it on a night before your sleep study to see how it affects you.   Why might my doctor order a sleep study? -- Your doctor will order a sleep study if he or she thinks you have sleep apnea or a different condition that makes you:   ?Have sudden jerking leg movements while you sleep, called  periodic limb movements.    ?Feel very sleepy during the day and fall asleep all of a sudden, called   narcolepsy.    ?Have trouble falling asleep or staying asleep over a long period of time, called  chronic insomnia.    ?Do odd things while you sleep, such as walking.  How should I prepare for a sleep study? -- On the day of your sleep study, you should:   ?Avoid alcohol   ?Avoid drinking coffee, tea, sodas, and other drinks that have caffeine in the afternoon and evening   ?Take all of your regular medicines     The cost of care estimate line is [phone]. They are able to give the patient an estimate of the charges and also an estimate of their insurance coverage/patient responsibility.   After your sleep study is performed, please call us at [phone] or [phone]  to schedule for a follow up to review the results of the sleep study.    Please bring one tab of low dose melatonin 3 mg or less to the night of the study.    Melatonin intake is completely voluntary.    You may take own melatonin after arrival to sleep center. Do not drive or operate machinery after intake of melatonin.

## 2021-10-08 ENCOUNTER — THERAPY VISIT (OUTPATIENT)
Dept: SLEEP MEDICINE | Facility: CLINIC | Age: 31
End: 2021-10-08
Payer: MEDICARE

## 2021-10-08 DIAGNOSIS — R51.9 SLEEP RELATED HEADACHES: ICD-10-CM

## 2021-10-08 DIAGNOSIS — R06.81 WITNESSED EPISODE OF APNEA: ICD-10-CM

## 2021-10-08 DIAGNOSIS — R40.0 INTERMITTENT DROWSINESS: ICD-10-CM

## 2021-10-08 DIAGNOSIS — R06.83 SNORING: ICD-10-CM

## 2021-10-08 PROCEDURE — 95810 POLYSOM 6/> YRS 4/> PARAM: CPT | Performed by: INTERNAL MEDICINE

## 2021-10-08 NOTE — PROGRESS NOTES
Mona has been scheduled for in lab sleep study at  () sleep center on 10/8/21    Return visit with Dr. Lyon has been scheduled on 11/30/21 for study results (added to wait list).     () Sleep study packet has been emailed today to [email or web address].       Tonja BUTLER CMA, SLEEP MEDICINE, 10/8/2021 1:21 PM

## 2021-10-12 LAB — SLPCOMP: NORMAL

## 2021-10-13 ENCOUNTER — ALLIED HEALTH/NURSE VISIT (OUTPATIENT)
Dept: FAMILY MEDICINE | Facility: CLINIC | Age: 31
End: 2021-10-13
Payer: MEDICARE

## 2021-10-13 ENCOUNTER — LAB (OUTPATIENT)
Dept: LAB | Facility: CLINIC | Age: 31
End: 2021-10-13

## 2021-10-13 DIAGNOSIS — Z23 IMMUNIZATION DUE: Primary | ICD-10-CM

## 2021-10-13 DIAGNOSIS — N13.5 STRICTURE OR KINKING OF URETER: ICD-10-CM

## 2021-10-13 LAB
ANION GAP SERPL CALCULATED.3IONS-SCNC: 10 MMOL/L (ref 5–18)
BUN SERPL-MCNC: 18 MG/DL (ref 8–22)
CALCIUM SERPL-MCNC: 10 MG/DL (ref 8.5–10.5)
CHLORIDE BLD-SCNC: 108 MMOL/L (ref 98–107)
CO2 SERPL-SCNC: 21 MMOL/L (ref 22–31)
CREAT SERPL-MCNC: 0.94 MG/DL (ref 0.6–1.1)
ERYTHROCYTE [DISTWIDTH] IN BLOOD BY AUTOMATED COUNT: 13.2 % (ref 10–15)
GFR SERPL CREATININE-BSD FRML MDRD: 83 ML/MIN/1.73M2
GLUCOSE BLD-MCNC: 92 MG/DL (ref 70–125)
HCT VFR BLD AUTO: 40 % (ref 35–47)
HGB BLD-MCNC: 12.5 G/DL (ref 11.7–15.7)
MCH RBC QN AUTO: 28 PG (ref 26.5–33)
MCHC RBC AUTO-ENTMCNC: 31.3 G/DL (ref 31.5–36.5)
MCV RBC AUTO: 90 FL (ref 78–100)
PLATELET # BLD AUTO: 238 10E3/UL (ref 150–450)
POTASSIUM BLD-SCNC: 4.2 MMOL/L (ref 3.5–5)
RBC # BLD AUTO: 4.46 10E6/UL (ref 3.8–5.2)
SODIUM SERPL-SCNC: 139 MMOL/L (ref 136–145)
WBC # BLD AUTO: 7.6 10E3/UL (ref 4–11)

## 2021-10-13 PROCEDURE — 87799 DETECT AGENT NOS DNA QUANT: CPT

## 2021-10-13 PROCEDURE — 99207 PR NO CHARGE NURSE ONLY: CPT

## 2021-10-13 PROCEDURE — 80197 ASSAY OF TACROLIMUS: CPT

## 2021-10-13 PROCEDURE — 80048 BASIC METABOLIC PNL TOTAL CA: CPT

## 2021-10-13 PROCEDURE — G0008 ADMIN INFLUENZA VIRUS VAC: HCPCS

## 2021-10-13 PROCEDURE — 90686 IIV4 VACC NO PRSV 0.5 ML IM: CPT

## 2021-10-13 PROCEDURE — 85027 COMPLETE CBC AUTOMATED: CPT

## 2021-10-13 PROCEDURE — 36415 COLL VENOUS BLD VENIPUNCTURE: CPT

## 2021-10-14 LAB
BKV DNA # SPEC NAA+PROBE: NOT DETECTED COPIES/ML
TACROLIMUS BLD-MCNC: 6 UG/L (ref 5–15)
TME LAST DOSE: NORMAL H
TME LAST DOSE: NORMAL H

## 2021-11-11 ENCOUNTER — LAB (OUTPATIENT)
Dept: LAB | Facility: CLINIC | Age: 31
End: 2021-11-11
Payer: MEDICARE

## 2021-11-11 ENCOUNTER — MYC MEDICAL ADVICE (OUTPATIENT)
Dept: FAMILY MEDICINE | Facility: CLINIC | Age: 31
End: 2021-11-11

## 2021-11-11 DIAGNOSIS — Z79.890 HORMONE REPLACEMENT THERAPY: ICD-10-CM

## 2021-11-11 DIAGNOSIS — N18.6 ESRD (END STAGE RENAL DISEASE) ON DIALYSIS (H): Primary | ICD-10-CM

## 2021-11-11 DIAGNOSIS — Z99.2 ESRD (END STAGE RENAL DISEASE) ON DIALYSIS (H): Primary | ICD-10-CM

## 2021-11-11 DIAGNOSIS — E06.3 HYPOTHYROIDISM DUE TO HASHIMOTO'S THYROIDITIS: ICD-10-CM

## 2021-11-11 DIAGNOSIS — N18.5 CHRONIC KIDNEY DISEASE, STAGE 5 (H): ICD-10-CM

## 2021-11-11 DIAGNOSIS — N13.5 STRICTURE OR KINKING OF URETER: ICD-10-CM

## 2021-11-11 LAB
ANION GAP SERPL CALCULATED.3IONS-SCNC: 10 MMOL/L (ref 5–18)
BUN SERPL-MCNC: 20 MG/DL (ref 8–22)
CALCIUM SERPL-MCNC: 9.9 MG/DL (ref 8.5–10.5)
CHLORIDE BLD-SCNC: 108 MMOL/L (ref 98–107)
CO2 SERPL-SCNC: 21 MMOL/L (ref 22–31)
CREAT SERPL-MCNC: 0.83 MG/DL (ref 0.6–1.1)
ERYTHROCYTE [DISTWIDTH] IN BLOOD BY AUTOMATED COUNT: 13.1 % (ref 10–15)
GFR SERPL CREATININE-BSD FRML MDRD: >90 ML/MIN/1.73M2
GLUCOSE BLD-MCNC: 99 MG/DL (ref 70–125)
HCT VFR BLD AUTO: 38.2 % (ref 35–47)
HGB BLD-MCNC: 11.9 G/DL (ref 11.7–15.7)
MCH RBC QN AUTO: 27.8 PG (ref 26.5–33)
MCHC RBC AUTO-ENTMCNC: 31.2 G/DL (ref 31.5–36.5)
MCV RBC AUTO: 89 FL (ref 78–100)
PLATELET # BLD AUTO: 223 10E3/UL (ref 150–450)
POTASSIUM BLD-SCNC: 4.4 MMOL/L (ref 3.5–5)
PROLACTIN SERPL-MCNC: 20.9 NG/ML (ref 0–20)
RBC # BLD AUTO: 4.28 10E6/UL (ref 3.8–5.2)
SODIUM SERPL-SCNC: 139 MMOL/L (ref 136–145)
TACROLIMUS BLD-MCNC: 5.9 UG/L (ref 5–15)
TME LAST DOSE: NORMAL H
TME LAST DOSE: NORMAL H
TSH SERPL DL<=0.005 MIU/L-ACNC: 2.49 UIU/ML (ref 0.3–5)
WBC # BLD AUTO: 8.8 10E3/UL (ref 4–11)

## 2021-11-11 PROCEDURE — 84146 ASSAY OF PROLACTIN: CPT

## 2021-11-11 PROCEDURE — 36415 COLL VENOUS BLD VENIPUNCTURE: CPT

## 2021-11-11 PROCEDURE — 80048 BASIC METABOLIC PNL TOTAL CA: CPT

## 2021-11-11 PROCEDURE — 80197 ASSAY OF TACROLIMUS: CPT

## 2021-11-11 PROCEDURE — 84443 ASSAY THYROID STIM HORMONE: CPT

## 2021-11-11 PROCEDURE — 87799 DETECT AGENT NOS DNA QUANT: CPT

## 2021-11-11 PROCEDURE — 82306 VITAMIN D 25 HYDROXY: CPT

## 2021-11-11 PROCEDURE — 85027 COMPLETE CBC AUTOMATED: CPT

## 2021-11-12 LAB — BKV DNA # SPEC NAA+PROBE: NOT DETECTED COPIES/ML

## 2021-11-15 LAB
DEPRECATED CALCIDIOL+CALCIFEROL SERPL-MC: <19 UG/L (ref 20–75)
VITAMIN D2 SERPL-MCNC: <5 UG/L
VITAMIN D3 SERPL-MCNC: 14 UG/L

## 2021-11-16 ENCOUNTER — VIRTUAL VISIT (OUTPATIENT)
Dept: ENDOCRINOLOGY | Facility: CLINIC | Age: 31
End: 2021-11-16
Payer: MEDICARE

## 2021-11-16 DIAGNOSIS — E06.3 HYPOTHYROIDISM DUE TO HASHIMOTO'S THYROIDITIS: Primary | ICD-10-CM

## 2021-11-16 PROCEDURE — 99215 OFFICE O/P EST HI 40 MIN: CPT | Mod: 95 | Performed by: INTERNAL MEDICINE

## 2021-11-16 NOTE — PROGRESS NOTES
Endocrinology Follow-up Visit    Mona Wagner is a 30 year old female who is being evaluated via a billable video visit.      How would you like to obtain your AVS? Cherrish  For the video visit, send the invitation by: Text to cell phone: [phone]  Will anyone else be joining your video visit? No        Follow-up for: hypothyroidism and hyperprolactinemia       HPI   Mona is a 30 years old female patient with a past medical history of suspected IgA nephropathy & ESKD s/p kidney transplant (8/2019), and a previous history of hypothyroidism, hyperprolactinemia and possible pituitary adenoma.    Mona received a kidney transplant in 8/3/19.  With the kidney transplant and treatment of her hypothyroidism, the hyperprolactinemia has resolved.    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      She has been vaccinated for COVID-19 and received her second shot of the Moderna vaccine on 4/17/2021 with a booster dose in September.     Galactorrhea: Briefly, Mona presented with galactorrhea that improved and then resolved when Mona started thyroid hormone replacement therapy in 2014. At that time, Prolactin levels remained elevated but stable, however Mona had ESKD, which could explain the elevated PRL levels related to low clearance.  MRI w/ contrast in 2017 showed a possible 6.7-5.3 mm microadenoma that was not confirmed in the subsequent MRI.  However, due to her kidney dysfunction, the study had to be done without contrast.  I have raised into question whether the patient truly had a prolactin producing microadenoma or her hyperprolactinemia was due to ESkD and/or hypothyroidism. She never saw a neurosurgeon or experienced peripheral vision changes.  Prolactin levels after her kidney transplant have been normal.    Hypothyroidism: Mona continues taking levothyroxine regularly.  She is currently on 25 mcg 4 times a week and 37.5 mcg 3 times a week.  She denies any symptoms suggestive of of hypo-or  hyperthyroidism.    Mona tells me that she has been cleared by her nephrologist to pursue pregnancy and she is waiting for her urologist clearance, as she has had hydronephrosis with need for stenting.  She is currently on birth control.  Her dad had CAD with tsarr for a bypass and that made her think that she wants to have children sooner rather than later.  Her LMP was last month.       REVIEW OF SYSTEMS  11 point ROS is as detailed in the HPI above, as below or negative      Past Medical History  Past Medical History:   Diagnosis Date     Anemia in chronic kidney disease      Dialysis patient (H)      End stage renal disease on dialysis (H)      Endocarditis      History of blood transfusion      History of DVT (deep vein thrombosis) 2014     History of seizure 2014     Hypertension      Hypothyroid      Kidney transplanted 08/03/2019    DCD DDKT. Induction with thymo 6mg/kg.     Pituitary adenoma (H)      Pyelonephritis of transplanted kidney 01/23/2020     Medications  Current Outpatient Medications   Medication     acetaminophen (TYLENOL) 325 MG tablet     aspirin (ASA) 81 MG chewable tablet     atorvastatin (LIPITOR) 10 MG tablet     cinacalcet (SENSIPAR) 30 MG tablet     clindamycin (CLEOCIN) 2 % vaginal cream     diphenhydrAMINE (BENADRYL) 25 MG capsule     EPINEPHrine (ANY BX GENERIC EQUIV) 0.3 MG/0.3ML injection 2-pack     fludrocortisone (FLORINEF) 0.1 MG tablet     hydrOXYzine (ATARAX) 10 MG tablet     lactobacillus rhamnosus, GG, (CULTURELL) capsule     levothyroxine (SYNTHROID/LEVOTHROID) 25 MCG tablet     MYFORTIC (BRAND) 180 MG EC tablet     norethindrone (MICRONOR) 0.35 MG tablet     senna-docusate (SENOKOT-S/PERICOLACE) 8.6-50 MG tablet     simethicone (MYLICON) 125 MG chewable tablet     tacrolimus (GENERIC EQUIVALENT) 1 MG capsule     ammonium lactate (AMLACTIN) 12 % external cream     magnesium oxide (MAG-OX) 400 MG tablet     oxyCODONE (ROXICODONE) 5 MG tablet     tacrolimus (GENERIC  EQUIVALENT) 0.5 MG capsule     No current facility-administered medications for this visit.       Current Outpatient Medications   Medication Sig Dispense Refill     acetaminophen (TYLENOL) 325 MG tablet Take 2 tablets (650 mg) by mouth every 4 hours as needed for mild pain 100 tablet 0     aspirin (ASA) 81 MG chewable tablet Take 81 mg by mouth daily       atorvastatin (LIPITOR) 10 MG tablet Take 1 tablet (10 mg) by mouth every morning 90 tablet 3     cinacalcet (SENSIPAR) 30 MG tablet Take 1 tablet (30 mg) by mouth daily 30 tablet 3     clindamycin (CLEOCIN) 2 % vaginal cream        diphenhydrAMINE (BENADRYL) 25 MG capsule Take 1 capsule (25 mg) by mouth as needed for itching or allergies Take 1-2 tablets by mouth every 6 hours PRN itching/allergies 60 capsule 0     EPINEPHrine (ANY BX GENERIC EQUIV) 0.3 MG/0.3ML injection 2-pack Inject 0.3 mLs (0.3 mg) into the muscle as needed for anaphylaxis 2 each 1     fludrocortisone (FLORINEF) 0.1 MG tablet Take 1 tablet (0.1 mg) by mouth Every Mon, Wed, Fri Morning 90 tablet 1     hydrOXYzine (ATARAX) 10 MG tablet Take 1 tablet (10 mg) by mouth 3 times daily as needed for anxiety 20 tablet 0     lactobacillus rhamnosus, GG, (CULTURELL) capsule Take 1 capsule by mouth 2 times daily        levothyroxine (SYNTHROID/LEVOTHROID) 25 MCG tablet TAKE 1 TABLET(25MCG) BY MOUTH TUES, THUR, SAT AND SUN AND 1 A ND 1/ 2 TABLETS( 37.5 MCG) ON MON WED AND  tablet 3     MYFORTIC (BRAND) 180 MG EC tablet Take 3 tablets (540 mg) by mouth 2 times daily 180 tablet 11     norethindrone (MICRONOR) 0.35 MG tablet Do not restart until your follow up appointment with your surgeon. Discuss restart date at that appointment. 84 tablet 1     senna-docusate (SENOKOT-S/PERICOLACE) 8.6-50 MG tablet Take 1-2 tablets by mouth 2 times daily (Patient taking differently: Take 1-2 tablets by mouth 2 times daily as needed ) 30 tablet 0     simethicone (MYLICON) 125 MG chewable tablet Take 1 tablet (125  mg) by mouth 4 times daily as needed for intestinal gas 120 tablet 0     tacrolimus (GENERIC EQUIVALENT) 1 MG capsule Take 3 capsules (3 mg) by mouth 2 times daily 180 capsule 11     ammonium lactate (AMLACTIN) 12 % external cream Apply twice a day as needed to rough bumpy skin. 140 g 11     magnesium oxide (MAG-OX) 400 MG tablet Take 1 tablet (400 mg) by mouth every morning (Patient not taking: Reported on 11/16/2021) 180 tablet 0     oxyCODONE (ROXICODONE) 5 MG tablet Take 1 tablet (5 mg) by mouth every 6 hours as needed for pain (Patient not taking: Reported on 11/16/2021) 6 tablet 0     tacrolimus (GENERIC EQUIVALENT) 0.5 MG capsule HOLD (Patient not taking: Reported on 11/16/2021) 60 capsule 11       Allergies  Allergies   Allergen Reactions     Contrast Dye Rash     CT contrast allergy 12/14/19 rash over eyes. Need to have pre medication before a CT WITH CONTRAST      Lisinopril Swelling     angioedema     Nitrous Oxide Other (See Comments)     Sense of doom     Chlorhexidine Rash     Rash at site     Sulfa Drugs Rash     Muscle stiffness of neck     Dapsone      Methemoglobinemia     Furosemide Other (See Comments)     Skin flushing     Metrogel [Metronidazole]      Hives diffusely on body after vaginal administration.     Azithromycin Dizziness and Rash     Cefuroxime Rash         Family History  family history includes Cerebrovascular Disease in her father and sister; Diabetes in her sister; Heart Disease in her father; Hypertension in her sister; Kidney Disease in her mother and sister; Kidney failure in her mother; Sleep Apnea in her father.    Father: 50 yrs old, CAD, recent bypass  Mother: ESKD    Social History  Social History     Tobacco Use     Smoking status: Never Smoker     Smokeless tobacco: Never Used   Substance Use Topics     Alcohol use: No     Alcohol/week: 0.0 standard drinks     Drug use: No       Physical Exam  There were no vitals taken for this visit.  There is no height or weight on  file to calculate BMI.   GENERAL: Healthy, alert and no distress  EYES: Eyes grossly normal to inspection.  No discharge or erythema, or obvious scleral/conjunctival abnormalities.  HENT: Normal cephalic/atraumatic.  External ears, nose and mouth without ulcers or lesions.  No nasal drainage visible.  NECK: No asymmetry, visible masses or scars  RESP: No audible wheeze, cough, or visible cyanosis.  No visible retractions or increased work of breathing.    MS: No gross musculoskeletal defects noted.  Normal range of motion.  No visible edema.  SKIN: Visible skin clear. No significant rash, abnormal pigmentation or lesions.  NEURO: Cranial nerves grossly intact.  Mentation and speech appropriate for age.  PSYCH: Mentation appears normal, affect normal/bright, judgement and insight intact, normal speech and appearance well-groomed.      DATA REVIEW  Pertinent blood work was reviewed . Last MRI brain was reviewed    ASSESSMENT/PLAN:   Katlyn Wagner is a very pleasant 30 years old female patient with a past medical history of suspected IgA nephropathy and ESKD s/p kidney transplant 8/3/2019, history of hypothyroidism, hyperprolactinemia likely secondary to hypothyroidism and end-stage kidney disease, with a questionable microadenoma, in follow-up of her endocrine issues.     1. Galactorrhea and hyperprolactinemia  Galactorrhea has resolved and prolactin levels have been normal since her kidney transplant. Most recent PRL is juts above the normal range. The normalization of her PRL levels with treatment of hypothyroidism and normalization of her kidney function after her transplant suggests that the patient does not have a pituitary adenoma that is secreting prolactin, and that this was rather functional hyperprolactinemia, likely related to hypothyroidism and end-stage kidney disease.  - continue to monitor annually  - discussed that PRL levels are expected to increase with pregnancy  - If there is evidence of a pituitary  adenoma secreting prolactin in the future, we would consider treatment with cabergoline     2. Hypothyroidism  Clinically euthyroid on current replacement doses.  -continue levothyroxine 25mcg 4x weekly and 37.5mcg 3x weekly  -If Mona becomes pregnant, she should increase levothyroxine dose to one and 1/2 of the 25 mcg tablets daily (as she prefers to be on the same dose every day), and repeat labs seen 6 to 8 weeks.      Return to clinic in 6 months for follow-up, sooner if the patient becomes pregnant    Orders Placed This Encounter   Procedures     Prolactin     TSH     T4 free       Patient Instructions   -Please continue levothyroxine 25mcg 4x weekly and 37.5mcg 3x weekly.  In the event that you become pregnant, levothyroxine dose should be increased to 1 and 1/2 tablets (25 mcg tablets) daily.  We would repeat labs 6 to 8 weeks after dose change.  -Please reschedule a follow-up appointment with me about 6 months from now.    -Please contact our clinic to plan for a appointment sooner than that if you become pregnant in the next few months.  Your follow-up appointment with me can be either in person or as a video visit.  - I placed order for blood work in about 6 months, juts prior to your next visit with me.  Our staff can assist with scheduling this lab appointment, or you can call the number blow to have it scheduled  -Please follow-up with Dr. Hill regarding the timing and need of another COVID booster  It was a pleasure to see you today.  Please let me know if you have any other immediate questions or concerns.  Sincerely,    Nidhi Coles MD PhD    Division of Endocrinology and Diabetes          Due to the COVID 19 pandemic this visit was a video visit. The patient gave verbal consent for the visit today.    I have independently reviewed and interpreted labs, imaging as indicated.     Video start time: 10:35 AM  Video stop time: 11:04 AM    51 minutes spent on the date of the  encounter doing chart review, history and exam, documentation and further activities per the note    Nidhi Coles MD PhD    Division of Endocrinology and Diabetes

## 2021-11-16 NOTE — PATIENT INSTRUCTIONS
-Please continue levothyroxine 25mcg 4x weekly and 37.5mcg 3x weekly.  In the event that you become pregnant, levothyroxine dose should be increased to 1 and 1/2 tablets (25 mcg tablets) daily.  We would repeat labs 6 to 8 weeks after dose change.  -Please reschedule a follow-up appointment with me about 6 months from now.    -Please contact our clinic to plan for a appointment sooner than that if you become pregnant in the next few months.  Your follow-up appointment with me can be either in person or as a video visit.  - I placed order for blood work in about 6 months, juts prior to your next visit with me.  Our staff can assist with scheduling this lab appointment, or you can call the number blow to have it scheduled  -Please follow-up with Dr. Hill regarding the timing and need of another COVID booster  It was a pleasure to see you today.  Please let me know if you have any other immediate questions or concerns.  Sincerely,    Nidhi Coles MD PhD    Division of Endocrinology and Diabetes

## 2021-11-16 NOTE — LETTER
11/16/2021       RE: Mona Wagner  [address]  [place] [address]     Dear Colleague,    Thank you for referring your patient, Mona Wagner, to the [facility] ENDOCRINOLOG[facility] [place] at [facility]. Please see a copy of my visit note below.      Endocrinology Follow-up Visit    Mona Wagner is a 30 year old female who is being evaluated via a billable video visit.      How would you like to obtain your AVS? [employer]  For the video visit, send the invitation by: Text to cell phone: [phone]  Will anyone else be joining your video visit? No        Follow-up for: hypothyroidism and hyperprolactinemia       HPI   Mona is a 30 years old female patient with a past medical history of suspected IgA nephropathy & ESKD s/p kidney transplant (8/2019), and a previous history of hypothyroidism, hyperprolactinemia and possible pituitary adenoma.    Mona received a kidney transplant in 8/3/19.  With the kidney transplant and treatment of her hypothyroidism, the hyperprolactinemia has resolved.    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      She has been vaccinated for COVID-19 and received her second shot of the Moderna vaccine on 4/17/2021 with a booster dose in September.     Galactorrhea: Briefly, Mona presented with galactorrhea that improved and then resolved when Mona started thyroid hormone replacement therapy in 2014. At that time, Prolactin levels remained elevated but stable, however Mona had ESKD, which could explain the elevated PRL levels related to low clearance.  MRI w/ contrast in 2017 showed a possible 6.7-5.3 mm microadenoma that was not confirmed in the subsequent MRI.  However, due to her kidney dysfunction, the study had to be done without contrast.  I have raised into question whether the patient truly had a prolactin producing microadenoma or her hyperprolactinemia was due to ESkD and/or hypothyroidism. She never saw a  neurosurgeon or experienced peripheral vision changes.  Prolactin levels after her kidney transplant have been normal.    Hypothyroidism: Mona continues taking levothyroxine regularly.  She is currently on 25 mcg 4 times a week and 37.5 mcg 3 times a week.  She denies any symptoms suggestive of of hypo-or hyperthyroidism.    Mona tells me that she has been cleared by her nephrologist to pursue pregnancy and she is waiting for her urologist clearance, as she has had hydronephrosis with need for stenting.  She is currently on birth control.  Her dad had CAD with starr for a bypass and that made her think that she wants to have children sooner rather than later.  Her LMP was last month.       REVIEW OF SYSTEMS  11 point ROS is as detailed in the HPI above, as below or negative      Past Medical History  Past Medical History:   Diagnosis Date     Anemia in chronic kidney disease      Dialysis patient (H)      End stage renal disease on dialysis (H)      Endocarditis      History of blood transfusion      History of DVT (deep vein thrombosis) 2014     History of seizure 2014     Hypertension      Hypothyroid      Kidney transplanted 08/03/2019    DCD DDKT. Induction with thymo 6mg/kg.     Pituitary adenoma (H)      Pyelonephritis of transplanted kidney 01/23/2020     Medications  Current Outpatient Medications   Medication     acetaminophen (TYLENOL) 325 MG tablet     aspirin (ASA) 81 MG chewable tablet     atorvastatin (LIPITOR) 10 MG tablet     cinacalcet (SENSIPAR) 30 MG tablet     clindamycin (CLEOCIN) 2 % vaginal cream     diphenhydrAMINE (BENADRYL) 25 MG capsule     EPINEPHrine (ANY BX GENERIC EQUIV) 0.3 MG/0.3ML injection 2-pack     fludrocortisone (FLORINEF) 0.1 MG tablet     hydrOXYzine (ATARAX) 10 MG tablet     lactobacillus rhamnosus, GG, (CULTURELL) capsule     levothyroxine (SYNTHROID/LEVOTHROID) 25 MCG tablet     MYFORTIC (BRAND) 180 MG EC tablet     norethindrone (MICRONOR) 0.35 MG tablet      senna-docusate (SENOKOT-S/PERICOLACE) 8.6-50 MG tablet     simethicone (MYLICON) 125 MG chewable tablet     tacrolimus (GENERIC EQUIVALENT) 1 MG capsule     ammonium lactate (AMLACTIN) 12 % external cream     magnesium oxide (MAG-OX) 400 MG tablet     oxyCODONE (ROXICODONE) 5 MG tablet     tacrolimus (GENERIC EQUIVALENT) 0.5 MG capsule     No current facility-administered medications for this visit.       Current Outpatient Medications   Medication Sig Dispense Refill     acetaminophen (TYLENOL) 325 MG tablet Take 2 tablets (650 mg) by mouth every 4 hours as needed for mild pain 100 tablet 0     aspirin (ASA) 81 MG chewable tablet Take 81 mg by mouth daily       atorvastatin (LIPITOR) 10 MG tablet Take 1 tablet (10 mg) by mouth every morning 90 tablet 3     cinacalcet (SENSIPAR) 30 MG tablet Take 1 tablet (30 mg) by mouth daily 30 tablet 3     clindamycin (CLEOCIN) 2 % vaginal cream        diphenhydrAMINE (BENADRYL) 25 MG capsule Take 1 capsule (25 mg) by mouth as needed for itching or allergies Take 1-2 tablets by mouth every 6 hours PRN itching/allergies 60 capsule 0     EPINEPHrine (ANY BX GENERIC EQUIV) 0.3 MG/0.3ML injection 2-pack Inject 0.3 mLs (0.3 mg) into the muscle as needed for anaphylaxis 2 each 1     fludrocortisone (FLORINEF) 0.1 MG tablet Take 1 tablet (0.1 mg) by mouth Every Mon, Wed, Fri Morning 90 tablet 1     hydrOXYzine (ATARAX) 10 MG tablet Take 1 tablet (10 mg) by mouth 3 times daily as needed for anxiety 20 tablet 0     lactobacillus rhamnosus, GG, (CULTURELL) capsule Take 1 capsule by mouth 2 times daily        levothyroxine (SYNTHROID/LEVOTHROID) 25 MCG tablet TAKE 1 TABLET(25MCG) BY MOUTH TUES, THUR, SAT AND SUN AND 1 A ND 1/ 2 TABLETS( 37.5 MCG) ON MON WED AND  tablet 3     MYFORTIC (BRAND) 180 MG EC tablet Take 3 tablets (540 mg) by mouth 2 times daily 180 tablet 11     norethindrone (MICRONOR) 0.35 MG tablet Do not restart until your follow up appointment with your surgeon.  Discuss restart date at that appointment. 84 tablet 1     senna-docusate (SENOKOT-S/PERICOLACE) 8.6-50 MG tablet Take 1-2 tablets by mouth 2 times daily (Patient taking differently: Take 1-2 tablets by mouth 2 times daily as needed ) 30 tablet 0     simethicone (MYLICON) 125 MG chewable tablet Take 1 tablet (125 mg) by mouth 4 times daily as needed for intestinal gas 120 tablet 0     tacrolimus (GENERIC EQUIVALENT) 1 MG capsule Take 3 capsules (3 mg) by mouth 2 times daily 180 capsule 11     ammonium lactate (AMLACTIN) 12 % external cream Apply twice a day as needed to rough bumpy skin. 140 g 11     magnesium oxide (MAG-OX) 400 MG tablet Take 1 tablet (400 mg) by mouth every morning (Patient not taking: Reported on 11/16/2021) 180 tablet 0     oxyCODONE (ROXICODONE) 5 MG tablet Take 1 tablet (5 mg) by mouth every 6 hours as needed for pain (Patient not taking: Reported on 11/16/2021) 6 tablet 0     tacrolimus (GENERIC EQUIVALENT) 0.5 MG capsule HOLD (Patient not taking: Reported on 11/16/2021) 60 capsule 11       Allergies  Allergies   Allergen Reactions     Contrast Dye Rash     CT contrast allergy 12/14/19 rash over eyes. Need to have pre medication before a CT WITH CONTRAST      Lisinopril Swelling     angioedema     Nitrous Oxide Other (See Comments)     Sense of doom     Chlorhexidine Rash     Rash at site     Sulfa Drugs Rash     Muscle stiffness of neck     Dapsone      Methemoglobinemia     Furosemide Other (See Comments)     Skin flushing     Metrogel [Metronidazole]      Hives diffusely on body after vaginal administration.     Azithromycin Dizziness and Rash     Cefuroxime Rash         Family History  family history includes Cerebrovascular Disease in her father and sister; Diabetes in her sister; Heart Disease in her father; Hypertension in her sister; Kidney Disease in her mother and sister; Kidney failure in her mother; Sleep Apnea in her father.    Father: 50 yrs old, CAD, recent bypass  Mother:  ESKD    Social History  Social History     Tobacco Use     Smoking status: Never Smoker     Smokeless tobacco: Never Used   Substance Use Topics     Alcohol use: No     Alcohol/week: 0.0 standard drinks     Drug use: No       Physical Exam  There were no vitals taken for this visit.  There is no height or weight on file to calculate BMI.   GENERAL: Healthy, alert and no distress  EYES: Eyes grossly normal to inspection.  No discharge or erythema, or obvious scleral/conjunctival abnormalities.  HENT: Normal cephalic/atraumatic.  External ears, nose and mouth without ulcers or lesions.  No nasal drainage visible.  NECK: No asymmetry, visible masses or scars  RESP: No audible wheeze, cough, or visible cyanosis.  No visible retractions or increased work of breathing.    MS: No gross musculoskeletal defects noted.  Normal range of motion.  No visible edema.  SKIN: Visible skin clear. No significant rash, abnormal pigmentation or lesions.  NEURO: Cranial nerves grossly intact.  Mentation and speech appropriate for age.  PSYCH: Mentation appears normal, affect normal/bright, judgement and insight intact, normal speech and appearance well-groomed.      DATA REVIEW  Pertinent blood work was reviewed . Last MRI brain was reviewed    ASSESSMENT/PLAN:   Katlyn Wagner is a very pleasant 30 years old female patient with a past medical history of suspected IgA nephropathy and ESKD s/p kidney transplant 8/3/2019, history of hypothyroidism, hyperprolactinemia likely secondary to hypothyroidism and end-stage kidney disease, with a questionable microadenoma, in follow-up of her endocrine issues.     1. Galactorrhea and hyperprolactinemia  Galactorrhea has resolved and prolactin levels have been normal since her kidney transplant. Most recent PRL is juts above the normal range. The normalization of her PRL levels with treatment of hypothyroidism and normalization of her kidney function after her transplant suggests that the patient does  not have a pituitary adenoma that is secreting prolactin, and that this was rather functional hyperprolactinemia, likely related to hypothyroidism and end-stage kidney disease.  - continue to monitor annually  - discussed that PRL levels are expected to increase with pregnancy  - If there is evidence of a pituitary adenoma secreting prolactin in the future, we would consider treatment with cabergoline     2. Hypothyroidism  Clinically euthyroid on current replacement doses.  -continue levothyroxine 25mcg 4x weekly and 37.5mcg 3x weekly  -If Mona becomes pregnant, she should increase levothyroxine dose to one and 1/2 of the 25 mcg tablets daily (as she prefers to be on the same dose every day), and repeat labs seen 6 to 8 weeks.      Return to clinic in 6 months for follow-up, sooner if the patient becomes pregnant    Orders Placed This Encounter   Procedures     Prolactin     TSH     T4 free       Patient Instructions   -Please continue levothyroxine 25mcg 4x weekly and 37.5mcg 3x weekly.  In the event that you become pregnant, levothyroxine dose should be increased to 1 and 1/2 tablets (25 mcg tablets) daily.  We would repeat labs 6 to 8 weeks after dose change.  -Please reschedule a follow-up appointment with me about 6 months from now.    -Please contact our clinic to plan for a appointment sooner than that if you become pregnant in the next few months.  Your follow-up appointment with me can be either in person or as a video visit.  - I placed order for blood work in about 6 months, juts prior to your next visit with me.  Our staff can assist with scheduling this lab appointment, or you can call the number blow to have it scheduled  -Please follow-up with Dr. Hill regarding the timing and need of another COVID booster  It was a pleasure to see you today.  Please let me know if you have any other immediate questions or concerns.  Sincerely,    Nidhi Coles MD PhD    Division of  Endocrinology and Diabetes          Due to the COVID 19 pandemic this visit was a video visit. The patient gave verbal consent for the visit today.    I have independently reviewed and interpreted labs, imaging as indicated.     Video start time: 10:35 AM  Video stop time: 11:04 AM    51 minutes spent on the date of the encounter doing chart review, history and exam, documentation and further activities per the note    Nidhi Coles MD PhD    Division of Endocrinology and Diabetes

## 2021-11-29 VITALS — WEIGHT: 148 LBS | HEIGHT: 61 IN | BODY MASS INDEX: 27.94 KG/M2

## 2021-11-29 ASSESSMENT — MIFFLIN-ST. JEOR: SCORE: 1325.95

## 2021-11-29 NOTE — PROGRESS NOTES
Mona is a 31 year old who is being evaluated via a billable video visit.      Are you currently in the state of [place]: Yes    How would you like to obtain your AVS? [employer]  Send, the invitation should be resent by: Text to cell phone: [phone]  Will anyone else be joining your video visit? No    Video Start Time: 12:56 PM  Video-Visit Details    Type of service:  Video Visit    Video End Time:1:13 PM    Originating Location (pt. Location): Home    Distant Location (provider location):  [facility]     Platform used for Video Visit: Magiq.    Additional 10 minutes on the date of service was spent performing the following:    -Preparing to see the patient (eg, review of tests)   -Ordering medications, tests, or procedures   -Documenting clinical information in the electronic or other health record     Thank you for the opportunity to participate in the care of Mona Wagner.     She is a 31 year old  female patient who comes to the sleep medicine clinic for review of sleep study results. The study was completed on 10/08/21  which showed that the patient had mild obstructive sleep apnea with an apnea hypopnea index of 9.4 events per hour with the lowest O2 Sat of 82.2%.    Assessment and Plan:  In summary Mona Wagner is a 31 year old year old female here for review of sleep study results.  1. Obstructive Sleep Apnea/Anxiety  We had an extensive conversation to review the results of her sleep study and to  her on the importance of treating sleep apnea. We discussed the options of treatment with oral appliance versus CPAP. Patient decided to proceed with CPAP. She will start using the device as soon as she receives it with the intention to use if for the entire night. We discussed some tips to increase PAP tolerance as well as the normal curve of adaptation. CPAP is going to provide improved respiratory function during the night but it can cause some sleep disruption that tends to  improve with continuous usage. She should return to the clinic in 7 weeks to review compliance and efficacy monitoring. Since the patient does not have any preference, we will use [employer] as the [employer].     ALEXIS:  ALEXIS Total Score: 15  Total score - [place]: 4 (11/29/2021 10:00 AM)    Patient Active Problem List   Diagnosis     DVT of upper extremity (deep vein thrombosis) (H)     Seizure disorder (H)     Galactorrhea     Acquired hypothyroidism     Increased prolactin level     Hypomagnesemia     Infective endocarditis-history of.  1/2019.  resolved.      Aftercare following organ transplant     Immunosuppression (H)     Methemoglobinemia     Kidney replaced by transplant     Anxiety     Secondary renal hyperparathyroidism (H)     Vitamin D deficiency     CKD (chronic kidney disease) stage 3, GFR 30-59 ml/min (H)     Stricture or kinking of ureter     Pyelonephritis     Diarrhea     Bicornate uterus     Normal Pap 3/2020.  repeat 3/2023     Prophylactic antibiotic     Hydronephrosis     Hydronephrosis of kidney transplant     Ureter, stricture     Urinary tract infection       Current Outpatient Medications   Medication Sig Dispense Refill     acetaminophen (TYLENOL) 325 MG tablet Take 2 tablets (650 mg) by mouth every 4 hours as needed for mild pain 100 tablet 0     ammonium lactate (AMLACTIN) 12 % external cream Apply twice a day as needed to rough bumpy skin. 140 g 11     aspirin (ASA) 81 MG chewable tablet Take 81 mg by mouth daily       atorvastatin (LIPITOR) 10 MG tablet Take 1 tablet (10 mg) by mouth every morning 90 tablet 3     cinacalcet (SENSIPAR) 30 MG tablet Take 1 tablet (30 mg) by mouth daily 30 tablet 3     clindamycin (CLEOCIN) 2 % vaginal cream        diphenhydrAMINE (BENADRYL) 25 MG capsule Take 1 capsule (25 mg) by mouth as needed for itching or allergies Take 1-2 tablets by mouth every 6 hours PRN itching/allergies 60 capsule 0     EPINEPHrine (ANY BX GENERIC  EQUIV) 0.3 MG/0.3ML injection 2-pack Inject 0.3 mLs (0.3 mg) into the muscle as needed for anaphylaxis 2 each 1     fludrocortisone (FLORINEF) 0.1 MG tablet Take 1 tablet (0.1 mg) by mouth Every Mon, Wed, Fri Morning 90 tablet 1     hydrOXYzine (ATARAX) 10 MG tablet Take 1 tablet (10 mg) by mouth 3 times daily as needed for anxiety 20 tablet 0     lactobacillus rhamnosus, GG, (CULTURELL) capsule Take 1 capsule by mouth 2 times daily        levothyroxine (SYNTHROID/LEVOTHROID) 25 MCG tablet TAKE 1 TABLET(25MCG) BY MOUTH TUES, THUR, SAT AND SUN AND 1 A ND 1/ 2 TABLETS( 37.5 MCG) ON MON WED AND  tablet 3     MYFORTIC (BRAND) 180 MG EC tablet Take 3 tablets (540 mg) by mouth 2 times daily 180 tablet 11     norethindrone (MICRONOR) 0.35 MG tablet Do not restart until your follow up appointment with your surgeon. Discuss restart date at that appointment. 84 tablet 1     senna-docusate (SENOKOT-S/PERICOLACE) 8.6-50 MG tablet Take 1-2 tablets by mouth 2 times daily (Patient taking differently: Take 1-2 tablets by mouth 2 times daily as needed ) 30 tablet 0     simethicone (MYLICON) 125 MG chewable tablet Take 1 tablet (125 mg) by mouth 4 times daily as needed for intestinal gas 120 tablet 0     tacrolimus (GENERIC EQUIVALENT) 0.5 MG capsule HOLD 60 capsule 11     tacrolimus (GENERIC EQUIVALENT) 1 MG capsule Take 3 capsules (3 mg) by mouth 2 times daily 180 capsule 11       Allergies   Allergen Reactions     Contrast Dye Rash     CT contrast allergy 12/14/19 rash over eyes. Need to have pre medication before a CT WITH CONTRAST      Lisinopril Swelling     angioedema     Nitrous Oxide Other (See Comments)     Sense of doom     Chlorhexidine Rash     Rash at site     Sulfa Drugs Rash     Muscle stiffness of neck     Dapsone      Methemoglobinemia     Furosemide Other (See Comments)     Skin flushing     Metrogel [Metronidazole]      Hives diffusely on body after vaginal administration.     Azithromycin Dizziness and  Rash     Cefuroxime Rash       Physical Exam:  GEN: NAD  Psych: normal mood, normal affect     Labs/Studies:  - We reviewed the results of the overnight study as described on the HPI.     Ferritin   Date Value Ref Range Status   08/04/2020 1,065 (H) 12 - 150 ng/mL Final         Patient verbalized understanding of these issues, agrees with the plan and all questions were answered today. Patient was given an opportuntity to voice any other symptoms or concerns not listed above. Patient did not have any other symptoms or concerns.      Jas Lyon DO  Board Certified in Internal Medicine and Sleep Medicine      (Note created with Dragon voice recognition and unintended spelling errors and word substitutions may occur)

## 2021-11-29 NOTE — PATIENT INSTRUCTIONS
Your BMI is Body mass index is 28.42 kg/m .  Weight management is a personal decision.  If you are interested in exploring weight loss strategies, the following discussion covers the approaches that may be successful. Body mass index (BMI) is one way to tell whether you are at a healthy weight, overweight, or obese. It measures your weight in relation to your height.  A BMI of 18.5 to 24.9 is in the healthy range. A person with a BMI of 25 to 29.9 is considered overweight, and someone with a BMI of 30 or greater is considered obese. More than two-thirds of American adults are considered overweight or obese.  Being overweight or obese increases the risk for further weight gain. Excess weight may lead to heart disease and diabetes.  Creating and following plans for healthy eating and physical activity may help you improve your health.  Weight control is part of healthy lifestyle and includes exercise, emotional health, and healthy eating habits. Careful eating habits lifelong are the mainstay of weight control. Though there are significant health benefits from weight loss, long-term weight loss with diet alone may be very difficult to achieve- studies show long-term success with dietary management in less than 10% of people. Attaining a healthy weight may be especially difficult to achieve in those with severe obesity. In some cases, medications, devices and surgical management might be considered.  What can you do?  If you are overweight or obese and are interested in methods for weight loss, you should discuss this with your provider.     Consider reducing daily calorie intake by 500 calories.     Keep a food journal.     Avoiding skipping meals, consider cutting portions instead.    Diet combined with exercise helps maintain muscle while optimizing fat loss. Strength training is particularly important for building and maintaining muscle mass. Exercise helps reduce stress, increase energy, and improves fitness.  "Increasing exercise without diet control, however, may not burn enough calories to loose weight.       Start walking three days a week 10-20 minutes at a time    Work towards walking thirty minutes five days a week     Eventually, increase the speed of your walking for 1-2 minutes at time    In addition, we recommend that you review healthy lifestyles and methods for weight loss available through the National Institutes of Health patient information sites:  http://win.niddk.nih.gov/publications/index.htm    And look into health and wellness programs that may be available through your health insurance provider, employer, local community center, or [employer]iana club.    Weight management plan: Patient was referred to their PCP to discuss a diet and exercise plan.       What is sleep apnea?     Sleep apnea is a condition that makes you stop breathing for short periods while you are asleep. There are 2 types of sleep apnea. One is called \"obstructive sleep apnea,\" and the other is called \"central sleep apnea.\"  In obstructive sleep apnea, you stop breathing because your throat narrows or closes (figure 1). In central sleep apnea, you stop breathing because your brain does not send the right signals to your muscles to make you breathe. When people talk about sleep apnea, they are usually referring to obstructive sleep apnea, which is what this article is about.  People with sleep apnea do not know that they stop breathing when they are asleep. But they do sometimes wake up startled or gasping for breath. They also often hear from loved ones that they snore.  What are the symptoms of sleep apnea? -- The main symptoms of sleep apnea are loud snoring, tiredness, and daytime sleepiness. Other symptoms can include:  ?Restless sleep  ?Waking up choking or gasping  ?Morning headaches, dry mouth, or sore throat  ?Waking up often to urinate  ?Waking up feeling unrested or groggy  ?Trouble thinking clearly or remembering things  Some " "people with sleep apnea don't have symptoms, or they don't know they have them. They might figure that it's normal to be tired or to snore a lot.  Should I see a doctor or nurse? -- Yes. If you think you might have sleep apnea, see your doctor.  Is there a test for sleep apnea? -- Yes. If your doctor or nurse suspects you have sleep apnea, he or she might send you for a \"sleep study.\" Sleep studies can sometimes be done at home, but they are usually done in a sleep lab. For the study, you spend the night in the lab, and you are hooked up to different machines that monitor your heart rate, breathing, and other body functions. The results of the test will tell your doctor or nurse if you have the disorder.  Is there anything I can do on my own to help my sleep apnea? -- Yes. Here are some things that might help:  ?Stay off your back when sleeping. (This is not always practical, because people cannot control their position while asleep. Plus, it only helps some people.)  ?Lose weight, if you are overweight  ?Avoid alcohol, because it can make sleep apnea worse  How is sleep apnea treated? -- The most effective treatment for sleep apnea is a device that keeps your airway open while you sleep. Treatment with this device is called \"continuous positive airway pressure,\" or CPAP. People getting CPAP wear a face mask at night that keeps them breathing (figure 2).  If your doctor or nurse recommends a CPAP machine, try to be patient about using it. The mask might seem uncomfortable to wear at first, and the machine might seem noisy, but using the machine can really pay off. People with sleep apnea who use a CPAP machine feel more rested and generally feel better.  There is also another device that you wear in your mouth called an \"oral appliance\" or \"mandibular advancement device.\" It also helps keep your airway open while you sleep.  In rare cases, when nothing else helps, doctors recommend surgery to keep the airway open. " Surgery to do this is not always effective, and even when it is, the problem can come back.  Is sleep apnea dangerous? -- It can be. People with sleep apnea do not get good-quality sleep, so they are often tired and not alert. This puts them at risk for car accidents and other types of accidents. Plus, studies show that people with sleep apnea are more likely than others to have high blood pressure, heart attacks, and other serious heart problems. In people with severe sleep apnea, getting treated (for example, with a CPAP machine) can help prevent some of these problems.     GRAPHICS  Airway in a person with sleep apnea    Normally when a person sleeps, the airway remains open, and air can pass from the nose and mouth to the lungs. In a person with sleep apnea, parts of the throat and mouth drop into the airway and block off the flow of air. This can cause loud snoring and interrupt breathing for short periods.  Graphic 01849 Version 5.0      Continuous positive airway pressure (CPAP) for sleep apnea    The CPAP mask gently blows air into your nose while you sleep. It puts just enough pressure on your airway to keep it from closing. The mask in this picture fits over just the nose. Other CPAP devices have masks that fit over the nose and mouth.              Equipment Instructions    We will process your PAP order and send it to a Durable Medical Equipment (DME) provider.    The medical equipment company should call you within 7 days.  If you have not heard from the company, please contact them to see if they received your order and are planning to call you.    Please call us at [phone] if you are unable to contact the medical equipment company or if they do not have the order.    If you are starting a new PAP machine, please call us after you use it the first night to let us know how it went. This call also helps us know that you received your equipment and that everything is ready. Please use our central  phone number [phone]    Contact information for [facility] company:    [facility] Tel: [phone]

## 2021-11-30 ENCOUNTER — VIRTUAL VISIT (OUTPATIENT)
Dept: SLEEP MEDICINE | Facility: CLINIC | Age: 31
End: 2021-11-30
Payer: MEDICARE

## 2021-11-30 DIAGNOSIS — G47.33 OBSTRUCTIVE SLEEP APNEA: Primary | ICD-10-CM

## 2021-11-30 DIAGNOSIS — F41.9 ANXIETY: ICD-10-CM

## 2021-11-30 PROCEDURE — 99214 OFFICE O/P EST MOD 30 MIN: CPT | Mod: 95 | Performed by: INTERNAL MEDICINE

## 2021-12-07 ENCOUNTER — OFFICE VISIT (OUTPATIENT)
Dept: CARDIOLOGY | Facility: CLINIC | Age: 31
End: 2021-12-07
Payer: MEDICARE

## 2021-12-07 VITALS
HEART RATE: 68 BPM | RESPIRATION RATE: 16 BRPM | BODY MASS INDEX: 28.99 KG/M2 | DIASTOLIC BLOOD PRESSURE: 66 MMHG | SYSTOLIC BLOOD PRESSURE: 116 MMHG | WEIGHT: 151 LBS

## 2021-12-07 DIAGNOSIS — R07.2 PRECORDIAL PAIN: ICD-10-CM

## 2021-12-07 DIAGNOSIS — N18.6 ESRD (END STAGE RENAL DISEASE) ON DIALYSIS (H): ICD-10-CM

## 2021-12-07 DIAGNOSIS — R06.09 DYSPNEA ON EXERTION: ICD-10-CM

## 2021-12-07 DIAGNOSIS — Z99.2 ESRD (END STAGE RENAL DISEASE) ON DIALYSIS (H): ICD-10-CM

## 2021-12-07 DIAGNOSIS — I33.0 ACUTE BACTERIAL ENDOCARDITIS: Primary | ICD-10-CM

## 2021-12-07 DIAGNOSIS — L50.9 HIVES: ICD-10-CM

## 2021-12-07 PROCEDURE — 99204 OFFICE O/P NEW MOD 45 MIN: CPT | Performed by: INTERNAL MEDICINE

## 2021-12-07 NOTE — PROGRESS NOTES
[facility]  [phone]          Assessment/Recommendations   Patient without known cardiac disease with the exception of a previous history of bacterial endocarditis which is resolved.  She does continue to have a systolic murmur.  She does have chest discomfort which I believe is somewhat atypical, is sedentary and does have some dyspnea on exertion.  She would like to pursue pregnancy if acceptable after her transplant and there are no cardiac limitations.    Given the shortness of breath, and chest discomfort, I would favor a stress test and given her murmur I would recommend a stress echocardiogram.  This will allow us to look at the valves as well.    She is agreeable to this.    I have also recommended that she walk 30 minutes, at least five times a week or other types of exercise in a similar fashion in time.    I have recommended a lower carbohydrate diet and suggested the Mediterranean diet to her as well.  She will investigate that.    If she does 30 minutes of walking, five times a week and reduce his carbohydrates in her diet, she will undoubtedly lose weight which would be beneficial as well.    If the stress test is unremarkable we will see her back in 2 years, but of course would be happy to see her sooner if questions or problems arise.    Thank you for allowing us to participate in her care.       History of Present Illness/Subjective    Ms. Mona Wagner is a 31 year old female with no known coronary artery disease but a history of bacterial endocarditis which was successfully treated.  She also has a history of renal transplantation in August 2019.  She has been stable since that time but has picked up some weight.  She is not particularly active and does notice she climbs stairs or rushes a bit she will get short of breath.  She also complains of a mild chest discomfort in her left upper chest which can go to her back and occurs about every couple of months and will last a  couple of minutes.  It is not predictably brought on with physical activity.  She denies orthopnea, paroxysmal nocturnal dyspnea, peripheral edema, syncope or near syncopal episodes.  She does not have palpitations.  She was recently diagnosed with sleep apnea and is awaiting the sleep device.    She was treated for hypertension while on dialysis but her blood pressures been much better after the transplant.  She has never smoked cigarettes, has had some elevation in her cholesterol and takes our atorvastatin 10 mg a day.  Father had bypass surgery in his 50s.    She works as a pharmacy tech at [employer].  She likes her job and is .  She would like to start a family and where the reason she is here is to see if her cardiac situation would be restrictive in regard to pregnancy.    ECG: Personally reviewed.  January 2020 shows sinus tachycardia, incomplete right bundle branch block, and no significant ST-T wave changes.     Physical Examination Review of Systems   /66 (BP Location: Left arm, Patient Position: Sitting, Cuff Size: Adult Regular)   Pulse 68   Resp 16   Wt 68.5 kg (151 lb)   BMI 28.99 kg/m    Body mass index is 28.99 kg/m .  Wt Readings from Last 3 Encounters:   12/07/21 68.5 kg (151 lb)   11/29/21 67.1 kg (148 lb)   10/07/21 67.1 kg (148 lb)     General Appearance:   Alert, cooperative and in no acute distress.   ENT/Mouth: Patient wearing a mask.      EYES:  no scleral icterus, normal conjunctivae   Neck: JVP normal. No Hepatojugular reflux. Thyroid not visualized.   Chest/Lungs:   Lungs are clear to auscultation, equal chest wall expansion.   Cardiovascular:   S1, S2 with 2/6 systolic murmur heard best at the right upper sternal border, no clicks or rubs. Brachial, radial and posterior tibial pulses are intact and symetric. No carotid bruits noted   Abdomen:  Nontender. BS+.    Extremities: No cyanosis, clubbing or edema   Skin: no xanthelasma, warm.    Neurologic: normal arm movement  bilateral, no tremors     Psychiatric: Appropriate affect.      Enc Vitals  BP: 116/66  Pulse: 68  Resp: 16  Weight: 68.5 kg (151 lb)                                           Medical History  Surgical History Family History Social History   Past Medical History:   Diagnosis Date     Anemia in chronic kidney disease      Dialysis patient (H)      End stage renal disease on dialysis (H)      Endocarditis      History of blood transfusion      History of DVT (deep vein thrombosis) 2014     History of seizure 2014     Hypertension      Hypothyroid      Kidney transplanted 08/03/2019    DCD DDKT. Induction with thymo 6mg/kg.     Pituitary adenoma (H)      Pyelonephritis of transplanted kidney 01/23/2020    Past Surgical History:   Procedure Laterality Date     BENCH KIDNEY N/A 8/3/2019    Procedure: BACKBENCH PREPARATION, ALLOGRAFT, KIDNEY;  Surgeon: Dorian Johnson MD;  Location: [facility] OR     COMBINED CYSTOSCOPY, RETROGRADES, EXCHANGE STENT URETER(S) Right 11/23/2020    Procedure: CYSTOSCOPY, CYSOTGRAM, WITH RETROGRADE PYELOGRAM, ureteroscopy,  AND URETERAL STENT;  Surgeon: Wally Britt MD;  Location: [facility] OR     COMBINED CYSTOSCOPY, RETROGRADES, URETEROSCOPY, LASER HOLMIUM LITHOTRIPSY URETER(S), INSERT STENT N/A 12/6/2019    Procedure: CYSTOURETEROSCOPY, WITH RETROGRADE PYELOGRAM of transplant kidney, STENT INSERTION, Laser on standby;  Surgeon: Wally Britt MD;  Location: [facility] OR     CREATE FISTULA ARTERIOVENOUS UPPER EXTREMITY  1/2/2014    Procedure: CREATE FISTULA ARTERIOVENOUS UPPER EXTREMITY;  Left Wrist Arteriovenous Fistula Placement;  Surgeon: Shashi Castro MD;  Location: [facility] OR     CREATE FISTULA ARTERIOVENOUS UPPER EXTREMITY       CYSTOSCOPY, RETROGRADES, INSERT STENT URETER(S), COMBINED N/A 8/20/2020    Procedure: CYSTOSCOPY, WITH RETROGRADE PYELOGRAM, CYSTOGRAM AND URETERAL STENT INSERTION - TRANSPLANT KIDNEY;  Surgeon: Wally Britt MD;  Location: [facility] OR     IR CEREBRAL ANGIOGRAM   2014     PERCUTANEOUS BIOPSY KIDNEY Right 2019    Procedure: Right Kidney Biopsy;  Surgeon: Deny Rios MD;  Location:  OR     RASTA/DIALYSIS CATHETER  12/10/2013          TRANSPLANT KIDNEY RECIPIENT  DONOR N/A 8/3/2019    Procedure: TRANSPLANT, KIDNEY, RECIPIENT,  DONOR with Ureteral Stent Placement;  Surgeon: Dorian Johnson MD;  Location: [facility] OR    Family History   Problem Relation Age of Onset     Kidney Disease Mother      Kidney failure Mother      Cerebrovascular Disease Father      Heart Disease Father      Sleep Apnea Father      Hypertension Sister      Diabetes Sister      Cerebrovascular Disease Sister      Kidney Disease Sister      Coronary Artery Disease No family hx of      Breast Cancer No family hx of      Cancer - colorectal No family hx of      Ovarian Cancer No family hx of      Prostate Cancer No family hx of      Other Cancer No family hx of      Asthma No family hx of      Anesthesia Reaction No family hx of      Deep Vein Thrombosis (DVT) No family hx of      Melanoma No family hx of      Skin Cancer No family hx of     Social History     Socioeconomic History     Marital status:      Spouse name: Not on file     Number of children: 0     Years of education: Not on file     Highest education level: Not on file   Occupational History     Occupation: Pharmacy Technician   Tobacco Use     Smoking status: Never Smoker     Smokeless tobacco: Never Used   Substance and Sexual Activity     Alcohol use: No     Alcohol/week: 0.0 standard drinks     Drug use: No     Sexual activity: Yes     Partners: Male   Other Topics Concern     Parent/sibling w/ CABG, MI or angioplasty before 65F 55M? Not Asked   Social History Narrative    Date of Service:5/15/2013     Name: Mona VALDOVINOS (Month, Day, Year of birth): 1990     MRN: [id number]     New Patient: Yes    Preferred Language: English     Needed: No    County of Residence: [place]     Marital Status:     Household size: 8    Number of Dependents:0     Pregnant: 0    Average Monthly Income: $ 600    Citizenship Status: Citizen    Gave Pt [employer] and [employer] applications     Social Determinants of Health     Financial Resource Strain: Not on file   Food Insecurity: Not on file   Transportation Needs: Not on file   Physical Activity: Not on file   Stress: Not on file   Social Connections: Not on file   Intimate Partner Violence: Not on file   Housing Stability: Not on file          Medications  Allergies   Current Outpatient Medications   Medication Sig Dispense Refill     acetaminophen (TYLENOL) 325 MG tablet Take 2 tablets (650 mg) by mouth every 4 hours as needed for mild pain 100 tablet 0     ammonium lactate (AMLACTIN) 12 % external cream Apply twice a day as needed to rough bumpy skin. 140 g 11     aspirin (ASA) 81 MG chewable tablet Take 81 mg by mouth daily       atorvastatin (LIPITOR) 10 MG tablet Take 1 tablet (10 mg) by mouth every morning 90 tablet 3     cinacalcet (SENSIPAR) 30 MG tablet Take 1 tablet (30 mg) by mouth daily 30 tablet 3     clindamycin (CLEOCIN) 2 % vaginal cream        diphenhydrAMINE (BENADRYL) 25 MG capsule Take 1 capsule (25 mg) by mouth as needed for itching or allergies Take 1-2 tablets by mouth every 6 hours PRN itching/allergies 60 capsule 0     EPINEPHrine (ANY BX GENERIC EQUIV) 0.3 MG/0.3ML injection 2-pack Inject 0.3 mLs (0.3 mg) into the muscle as needed for anaphylaxis 2 each 1     fludrocortisone (FLORINEF) 0.1 MG tablet Take 1 tablet (0.1 mg) by mouth Every Mon, Wed, Fri Morning 90 tablet 1     hydrOXYzine (ATARAX) 10 MG tablet Take 1 tablet (10 mg) by mouth 3 times daily as needed for anxiety 20 tablet 0     lactobacillus rhamnosus, GG, (CULTURELL) capsule Take 1 capsule by mouth 2 times daily        levothyroxine (SYNTHROID/LEVOTHROID) 25 MCG tablet TAKE 1 TABLET(25MCG) BY MOUTH TUES, THUR, SAT AND SUN AND 1 A ND 1/ 2 TABLETS( 37.5 MCG) ON MON WED  AND  tablet 3     MYFORTIC (BRAND) 180 MG EC tablet Take 3 tablets (540 mg) by mouth 2 times daily 180 tablet 11     norethindrone (MICRONOR) 0.35 MG tablet Do not restart until your follow up appointment with your surgeon. Discuss restart date at that appointment. 84 tablet 1     senna-docusate (SENOKOT-S/PERICOLACE) 8.6-50 MG tablet Take 1-2 tablets by mouth 2 times daily (Patient taking differently: Take 1-2 tablets by mouth 2 times daily as needed ) 30 tablet 0     simethicone (MYLICON) 125 MG chewable tablet Take 1 tablet (125 mg) by mouth 4 times daily as needed for intestinal gas 120 tablet 0     tacrolimus (GENERIC EQUIVALENT) 0.5 MG capsule HOLD 60 capsule 11     tacrolimus (GENERIC EQUIVALENT) 1 MG capsule Take 3 capsules (3 mg) by mouth 2 times daily 180 capsule 11    Allergies   Allergen Reactions     Contrast Dye Rash     CT contrast allergy 12/14/19 rash over eyes. Need to have pre medication before a CT WITH CONTRAST      Lisinopril Swelling     angioedema     Nitrous Oxide Other (See Comments)     Sense of doom     Chlorhexidine Rash     Rash at site     Sulfa Drugs Rash     Muscle stiffness of neck     Dapsone      Methemoglobinemia     Furosemide Other (See Comments)     Skin flushing     Metrogel [Metronidazole]      Hives diffusely on body after vaginal administration.     Azithromycin Dizziness and Rash     Cefuroxime Rash         Lab Results    Chemistry/lipid CBC Cardiac Enzymes/BNP/TSH/INR   Lab Results   Component Value Date    CHOL 190 08/20/2021    HDL 38 (L) 08/20/2021    TRIG 120 08/20/2021    BUN 20 11/11/2021     11/11/2021    CO2 21 (L) 11/11/2021    Lab Results   Component Value Date    WBC 8.8 11/11/2021    HGB 11.9 11/11/2021    HCT 38.2 11/11/2021    MCV 89 11/11/2021     11/11/2021    Lab Results   Component Value Date    TSH 2.49 11/11/2021    INR 0.95 08/30/2019

## 2021-12-07 NOTE — PATIENT INSTRUCTIONS
[facility]  [phone]    Mona Wagner,    It was a pleasure to see you today at the [facility].     My recommendations after this visit include:    1.  We will schedule a stress echocardiogram to evaluate your shortness of breath and the chest discomfort that you have in your left upper chest.  We will call you with the results and any further recommendations.    2.  Please start a walking regimen.  Would recommend that you walk briskly for 30 minutes and at least five times each week.  This will be quite beneficial in the long-term.    3.  Would follow a low carbohydrate diet.  The Mediterranean diet has been shown to reduce the risk of future cardiac events.    4.  We will see you back in 2 years if the stress test looks normal.        Paul Toledo

## 2021-12-07 NOTE — LETTER
12/7/2021    Macarena Bowen MD  [facility] [address] [facility] [address]    RE: Mona Wagner       Dear Colleague,     I had the pleasure of seeing Mona Wagner in the [facility].      [facility]  [phone]    Assessment/Recommendations   Patient without known cardiac disease with the exception of a previous history of bacterial endocarditis which is resolved.  She does continue to have a systolic murmur.  She does have chest discomfort which I believe is somewhat atypical, is sedentary and does have some dyspnea on exertion.  She would like to pursue pregnancy if acceptable after her transplant and there are no cardiac limitations.    Given the shortness of breath, and chest discomfort, I would favor a stress test and given her murmur I would recommend a stress echocardiogram.  This will allow us to look at the valves as well.    She is agreeable to this.    I have also recommended that she walk 30 minutes, at least five times a week or other types of exercise in a similar fashion in time.    I have recommended a lower carbohydrate diet and suggested the Mediterranean diet to her as well.  She will investigate that.    If she does 30 minutes of walking, five times a week and reduce his carbohydrates in her diet, she will undoubtedly lose weight which would be beneficial as well.    If the stress test is unremarkable we will see her back in 2 years, but of course would be happy to see her sooner if questions or problems arise.    Thank you for allowing us to participate in her care.       History of Present Illness/Subjective    Ms. Mona Wagner is a 31 year old female with no known coronary artery disease but a history of bacterial endocarditis which was successfully treated.  She also has a history of renal transplantation in August 2019.  She has been stable since that time but has picked up some weight.  She is not particularly active and does notice  she climbs stairs or rushes a bit she will get short of breath.  She also complains of a mild chest discomfort in her left upper chest which can go to her back and occurs about every couple of months and will last a couple of minutes.  It is not predictably brought on with physical activity.  She denies orthopnea, paroxysmal nocturnal dyspnea, peripheral edema, syncope or near syncopal episodes.  She does not have palpitations.  She was recently diagnosed with sleep apnea and is awaiting the sleep device.    She was treated for hypertension while on dialysis but her blood pressures been much better after the transplant.  She has never smoked cigarettes, has had some elevation in her cholesterol and takes our atorvastatin 10 mg a day.  Father had bypass surgery in his 50s.    She works as a pharmacy tech at [employer].  She likes her job and is .  She would like to start a family and where the reason she is here is to see if her cardiac situation would be restrictive in regard to pregnancy.    ECG: Personally reviewed.  January 2020 shows sinus tachycardia, incomplete right bundle branch block, and no significant ST-T wave changes.     Physical Examination Review of Systems   /66 (BP Location: Left arm, Patient Position: Sitting, Cuff Size: Adult Regular)   Pulse 68   Resp 16   Wt 68.5 kg (151 lb)   BMI 28.99 kg/m    Body mass index is 28.99 kg/m .  Wt Readings from Last 3 Encounters:   12/07/21 68.5 kg (151 lb)   11/29/21 67.1 kg (148 lb)   10/07/21 67.1 kg (148 lb)     General Appearance:   Alert, cooperative and in no acute distress.   ENT/Mouth: Patient wearing a mask.      EYES:  no scleral icterus, normal conjunctivae   Neck: JVP normal. No Hepatojugular reflux. Thyroid not visualized.   Chest/Lungs:   Lungs are clear to auscultation, equal chest wall expansion.   Cardiovascular:   S1, S2 with 2/6 systolic murmur heard best at the right upper sternal border, no clicks or rubs. Brachial, radial  and posterior tibial pulses are intact and symetric. No carotid bruits noted   Abdomen:  Nontender. BS+.    Extremities: No cyanosis, clubbing or edema   Skin: no xanthelasma, warm.    Neurologic: normal arm movement bilateral, no tremors     Psychiatric: Appropriate affect.      Enc Vitals  BP: 116/66  Pulse: 68  Resp: 16  Weight: 68.5 kg (151 lb)                                           Medical History  Surgical History Family History Social History   Past Medical History:   Diagnosis Date     Anemia in chronic kidney disease      Dialysis patient (H)      End stage renal disease on dialysis (H)      Endocarditis      History of blood transfusion      History of DVT (deep vein thrombosis) 2014     History of seizure 2014     Hypertension      Hypothyroid      Kidney transplanted 08/03/2019    DCD DDKT. Induction with thymo 6mg/kg.     Pituitary adenoma (H)      Pyelonephritis of transplanted kidney 01/23/2020    Past Surgical History:   Procedure Laterality Date     BENCH KIDNEY N/A 8/3/2019    Procedure: BACKBENCH PREPARATION, ALLOGRAFT, KIDNEY;  Surgeon: Dorian Johnson MD;  Location: [facility] OR     COMBINED CYSTOSCOPY, RETROGRADES, EXCHANGE STENT URETER(S) Right 11/23/2020    Procedure: CYSTOSCOPY, CYSOTGRAM, WITH RETROGRADE PYELOGRAM, ureteroscopy,  AND URETERAL STENT;  Surgeon: Wally Britt MD;  Location: [facility] OR     COMBINED CYSTOSCOPY, RETROGRADES, URETEROSCOPY, LASER HOLMIUM LITHOTRIPSY URETER(S), INSERT STENT N/A 12/6/2019    Procedure: CYSTOURETEROSCOPY, WITH RETROGRADE PYELOGRAM of transplant kidney, STENT INSERTION, Laser on standby;  Surgeon: Wally Britt MD;  [facility] OR     CREATE FISTULA ARTERIOVENOUS UPPER EXTREMITY  1/2/2014    Procedure: CREATE FISTULA ARTERIOVENOUS UPPER EXTREMITY;  Left Wrist Arteriovenous Fistula Placement;  Surgeon: Shashi Castro MD;  Location: [facility] OR     CREATE FISTULA ARTERIOVENOUS UPPER EXTREMITY       CYSTOSCOPY, RETROGRADES, INSERT STENT URETER(S),  COMBINED N/A 2020    Procedure: CYSTOSCOPY, WITH RETROGRADE PYELOGRAM, CYSTOGRAM AND URETERAL STENT INSERTION - TRANSPLANT KIDNEY;  Surgeon: Wally Britt MD;  Location: [facility] OR     IR CEREBRAL ANGIOGRAM  2014     PERCUTANEOUS BIOPSY KIDNEY Right 2019    Procedure: Right Kidney Biopsy;  Surgeon: Deny Rios MD;  Location:  OR     RASTA/DIALYSIS CATHETER  12/10/2013          TRANSPLANT KIDNEY RECIPIENT  DONOR N/A 8/3/2019    Procedure: TRANSPLANT, KIDNEY, RECIPIENT,  DONOR with Ureteral Stent Placement;  Surgeon: Dorian Johnson MD;  Location: [facility] OR    Family History   Problem Relation Age of Onset     Kidney Disease Mother      Kidney failure Mother      Cerebrovascular Disease Father      Heart Disease Father      Sleep Apnea Father      Hypertension Sister      Diabetes Sister      Cerebrovascular Disease Sister      Kidney Disease Sister      Coronary Artery Disease No family hx of      Breast Cancer No family hx of      Cancer - colorectal No family hx of      Ovarian Cancer No family hx of      Prostate Cancer No family hx of      Other Cancer No family hx of      Asthma No family hx of      Anesthesia Reaction No family hx of      Deep Vein Thrombosis (DVT) No family hx of      Melanoma No family hx of      Skin Cancer No family hx of     Social History     Socioeconomic History     Marital status:      Spouse name: Not on file     Number of children: 0     Years of education: Not on file     Highest education level: Not on file   Occupational History     Occupation: Pharmacy Technician   Tobacco Use     Smoking status: Never Smoker     Smokeless tobacco: Never Used   Substance and Sexual Activity     Alcohol use: No     Alcohol/week: 0.0 standard drinks     Drug use: No     Sexual activity: Yes     Partners: Male   Other Topics Concern     Parent/sibling w/ CABG, MI or angioplasty before 65F 55M? Not Asked   Social History Narrative    Date of  Service:5/15/2013     Name: Mona VALDOVINOS (Month, Day, Year of birth): 1990     MRN: [id number]     New Patient: Yes    Preferred Language: English     Needed: No    County of Residence: [place]    Marital Status:     Household size: 8    Number of Dependents:0     Pregnant: 0    Average Monthly Income: $ 600    Citizenship Status: Citizen    Gave Pt [employer] and [employer] applications     Social Determinants of Health     Financial Resource Strain: Not on file   Food Insecurity: Not on file   Transportation Needs: Not on file   Physical Activity: Not on file   Stress: Not on file   Social Connections: Not on file   Intimate Partner Violence: Not on file   Housing Stability: Not on file          Medications  Allergies   Current Outpatient Medications   Medication Sig Dispense Refill     acetaminophen (TYLENOL) 325 MG tablet Take 2 tablets (650 mg) by mouth every 4 hours as needed for mild pain 100 tablet 0     ammonium lactate (AMLACTIN) 12 % external cream Apply twice a day as needed to rough bumpy skin. 140 g 11     aspirin (ASA) 81 MG chewable tablet Take 81 mg by mouth daily       atorvastatin (LIPITOR) 10 MG tablet Take 1 tablet (10 mg) by mouth every morning 90 tablet 3     cinacalcet (SENSIPAR) 30 MG tablet Take 1 tablet (30 mg) by mouth daily 30 tablet 3     clindamycin (CLEOCIN) 2 % vaginal cream        diphenhydrAMINE (BENADRYL) 25 MG capsule Take 1 capsule (25 mg) by mouth as needed for itching or allergies Take 1-2 tablets by mouth every 6 hours PRN itching/allergies 60 capsule 0     EPINEPHrine (ANY BX GENERIC EQUIV) 0.3 MG/0.3ML injection 2-pack Inject 0.3 mLs (0.3 mg) into the muscle as needed for anaphylaxis 2 each 1     fludrocortisone (FLORINEF) 0.1 MG tablet Take 1 tablet (0.1 mg) by mouth Every Mon, Wed, Fri Morning 90 tablet 1     hydrOXYzine (ATARAX) 10 MG tablet Take 1 tablet (10 mg) by mouth 3 times daily as needed for anxiety 20 tablet 0     lactobacillus rhamnosus,  GG, (CULTURELL) capsule Take 1 capsule by mouth 2 times daily        levothyroxine (SYNTHROID/LEVOTHROID) 25 MCG tablet TAKE 1 TABLET(25MCG) BY MOUTH TUES, THUR, SAT AND SUN AND 1 A ND 1/ 2 TABLETS( 37.5 MCG) ON MON WED AND  tablet 3     MYFORTIC (BRAND) 180 MG EC tablet Take 3 tablets (540 mg) by mouth 2 times daily 180 tablet 11     norethindrone (MICRONOR) 0.35 MG tablet Do not restart until your follow up appointment with your surgeon. Discuss restart date at that appointment. 84 tablet 1     senna-docusate (SENOKOT-S/PERICOLACE) 8.6-50 MG tablet Take 1-2 tablets by mouth 2 times daily (Patient taking differently: Take 1-2 tablets by mouth 2 times daily as needed ) 30 tablet 0     simethicone (MYLICON) 125 MG chewable tablet Take 1 tablet (125 mg) by mouth 4 times daily as needed for intestinal gas 120 tablet 0     tacrolimus (GENERIC EQUIVALENT) 0.5 MG capsule HOLD 60 capsule 11     tacrolimus (GENERIC EQUIVALENT) 1 MG capsule Take 3 capsules (3 mg) by mouth 2 times daily 180 capsule 11    Allergies   Allergen Reactions     Contrast Dye Rash     CT contrast allergy 12/14/19 rash over eyes. Need to have pre medication before a CT WITH CONTRAST      Lisinopril Swelling     angioedema     Nitrous Oxide Other (See Comments)     Sense of doom     Chlorhexidine Rash     Rash at site     Sulfa Drugs Rash     Muscle stiffness of neck     Dapsone      Methemoglobinemia     Furosemide Other (See Comments)     Skin flushing     Metrogel [Metronidazole]      Hives diffusely on body after vaginal administration.     Azithromycin Dizziness and Rash     Cefuroxime Rash         Lab Results    Chemistry/lipid CBC Cardiac Enzymes/BNP/TSH/INR   Lab Results   Component Value Date    CHOL 190 08/20/2021    HDL 38 (L) 08/20/2021    TRIG 120 08/20/2021    BUN 20 11/11/2021     11/11/2021    CO2 21 (L) 11/11/2021    Lab Results   Component Value Date    WBC 8.8 11/11/2021    HGB 11.9 11/11/2021    HCT 38.2 11/11/2021     MCV 89 11/11/2021     11/11/2021    Lab Results   Component Value Date    TSH 2.49 11/11/2021    INR 0.95 08/30/2019                cc:   Macarena Bowen MD  [facility]  [address] [place] [address]

## 2021-12-08 RX ORDER — EPINEPHRINE 0.3 MG/.3ML
0.3 INJECTION SUBCUTANEOUS ONCE
Qty: 2 EACH | Refills: 0 | Status: SHIPPED | OUTPATIENT
Start: 2021-12-08 | End: 2021-12-08

## 2021-12-21 ENCOUNTER — LAB (OUTPATIENT)
Dept: LAB | Facility: CLINIC | Age: 31
End: 2021-12-21
Payer: MEDICARE

## 2021-12-21 DIAGNOSIS — N13.5 STRICTURE OR KINKING OF URETER: ICD-10-CM

## 2021-12-21 LAB
ANION GAP SERPL CALCULATED.3IONS-SCNC: 9 MMOL/L (ref 5–18)
BUN SERPL-MCNC: 20 MG/DL (ref 8–22)
CALCIUM SERPL-MCNC: 9.5 MG/DL (ref 8.5–10.5)
CHLORIDE BLD-SCNC: 110 MMOL/L (ref 98–107)
CO2 SERPL-SCNC: 20 MMOL/L (ref 22–31)
CREAT SERPL-MCNC: 0.92 MG/DL (ref 0.6–1.1)
ERYTHROCYTE [DISTWIDTH] IN BLOOD BY AUTOMATED COUNT: 13 % (ref 10–15)
GFR SERPL CREATININE-BSD FRML MDRD: 86 ML/MIN/1.73M2
GLUCOSE BLD-MCNC: 101 MG/DL (ref 70–125)
HCT VFR BLD AUTO: 39.3 % (ref 35–47)
HGB BLD-MCNC: 12.5 G/DL (ref 11.7–15.7)
MCH RBC QN AUTO: 28.2 PG (ref 26.5–33)
MCHC RBC AUTO-ENTMCNC: 31.8 G/DL (ref 31.5–36.5)
MCV RBC AUTO: 89 FL (ref 78–100)
PLATELET # BLD AUTO: 232 10E3/UL (ref 150–450)
POTASSIUM BLD-SCNC: 4.3 MMOL/L (ref 3.5–5)
RBC # BLD AUTO: 4.44 10E6/UL (ref 3.8–5.2)
SODIUM SERPL-SCNC: 139 MMOL/L (ref 136–145)
WBC # BLD AUTO: 6.6 10E3/UL (ref 4–11)

## 2021-12-21 PROCEDURE — 85027 COMPLETE CBC AUTOMATED: CPT

## 2021-12-21 PROCEDURE — 80048 BASIC METABOLIC PNL TOTAL CA: CPT

## 2021-12-21 PROCEDURE — 80197 ASSAY OF TACROLIMUS: CPT

## 2021-12-21 PROCEDURE — 87799 DETECT AGENT NOS DNA QUANT: CPT

## 2021-12-21 PROCEDURE — 36415 COLL VENOUS BLD VENIPUNCTURE: CPT

## 2021-12-22 LAB
BKV DNA # SPEC NAA+PROBE: NOT DETECTED COPIES/ML
TACROLIMUS BLD-MCNC: 6.4 UG/L (ref 5–15)
TME LAST DOSE: NORMAL H
TME LAST DOSE: NORMAL H

## 2021-12-23 DIAGNOSIS — R14.0 BLOATED ABDOMEN: ICD-10-CM

## 2021-12-23 DIAGNOSIS — M54.9 BACK PAIN, UNSPECIFIED BACK LOCATION, UNSPECIFIED BACK PAIN LATERALITY, UNSPECIFIED CHRONICITY: ICD-10-CM

## 2021-12-23 NOTE — TELEPHONE ENCOUNTER
Message to physician:     Date of last visit: 8/20/2021    Date of next visit if scheduled: none    Potassium   Date Value Ref Range Status   12/21/2021 4.3 3.5 - 5.0 mmol/L Final   07/07/2021 4.2 3.4 - 5.3 mmol/L Final     Creatinine   Date Value Ref Range Status   12/21/2021 0.92 0.60 - 1.10 mg/dL Final   07/07/2021 0.91 0.52 - 1.04 mg/dL Final     GFR Estimate   Date Value Ref Range Status   12/21/2021 86 >60 mL/min/1.73m2 Final     Comment:     Effective December 21, 2021 eGFRcr in adults is calculated using the 2021 CKD-EPI creatinine equation which includes age and gender (Dillon et al., NEJ, DOI: 10.1056/NXJOhj7117669)   07/07/2021 86 >60 mL/min/[1.73_m2] Final     Comment:     Non  GFR Calc  Starting 12/18/2018, serum creatinine based estimated GFR (eGFR) will be   calculated using the Chronic Kidney Disease Epidemiology Collaboration   (CKD-EPI) equation.         BP Readings from Last 3 Encounters:   12/07/21 116/66   08/20/21 112/74   03/12/21 108/66       Hemoglobin A1C POCT   Date Value Ref Range Status   08/04/2020 5.5 0 - 5.6 % Final     Comment:     Normal <5.7% Prediabetes 5.7-6.4%  Diabetes 6.5% or higher - adopted from ADA   consensus guidelines.         Please complete refill and CLOSE ENCOUNTER.  Closing the encounter signifies the refill is complete.

## 2021-12-26 DIAGNOSIS — Z94.0 KIDNEY TRANSPLANTED: Primary | ICD-10-CM

## 2021-12-26 RX ORDER — SIMETHICONE 125 MG
125 TABLET,CHEWABLE ORAL 4 TIMES DAILY PRN
Qty: 120 TABLET | Refills: 3 | Status: ON HOLD | OUTPATIENT
Start: 2021-12-26 | End: 2023-08-15

## 2021-12-26 RX ORDER — ACETAMINOPHEN 325 MG/1
650 TABLET ORAL EVERY 4 HOURS PRN
Qty: 100 TABLET | Refills: 3 | Status: SHIPPED | OUTPATIENT
Start: 2021-12-26 | End: 2023-06-23

## 2021-12-27 RX ORDER — ASPIRIN 81 MG/1
81 TABLET, CHEWABLE ORAL DAILY
Qty: 90 TABLET | Refills: 3 | Status: SHIPPED | OUTPATIENT
Start: 2021-12-27 | End: 2022-12-27

## 2021-12-29 DIAGNOSIS — Z30.41 SURVEILLANCE OF PREVIOUSLY PRESCRIBED CONTRACEPTIVE PILL: ICD-10-CM

## 2021-12-29 RX ORDER — ACETAMINOPHEN AND CODEINE PHOSPHATE 120; 12 MG/5ML; MG/5ML
SOLUTION ORAL
Qty: 84 TABLET | Refills: 3 | Status: SHIPPED | OUTPATIENT
Start: 2021-12-29 | End: 2023-01-16

## 2022-01-01 NOTE — PROGRESS NOTES
Nursing Note  Mona Wagner presents [facility] for:   Chief Complaint   Patient presents with     Infusion     Fluids     During today's [facility] appointment, orders from  were completed.  Frequency: once    Progress note:  Patient identification verified by name and date of birth.  Assessment completed.  Vitals recorded in Doc Flowsheets.  Patient was provided with education regarding medication/procedure and possible side effects.  Patient verbalized understanding.     present during visit today: Not Applicable.    Treatment Conditions: Patient denies fever, chills, signs of infection, recent illness, antibiotics use, productive cough or elevated temperature.    Patient reports 15 or more episodes of diarrhea yesterday 10-3-20, today she reports 2 episodes before arriving at [facility] at 0900.    2nd Liter of normal saline infused after speaking with , also per  patient will continue with fiber and start probiotics today. Patient understands and agrees.    Premedications: were not ordered.    Drug Waste Record: No    Infusion length and rate:  infusion given over approximately 1 hour for each liter of normal saline.    Labs: were not ordered for this appointment.    Vascular access: peripheral IV placed today.    Post Infusion Assessment:  Patient tolerated infusion without incident.  Blood return noted pre and post infusion.  Site patent and intact, free from redness, edema or discomfort.  Access discontinued per protocol.     Discharge Plan:   Follow up plan of care with: primary care provider, and transplant coordinator.  Discharge instructions were reviewed with patient.  Patient/representative verbalized understanding of discharge instructions and all questions answered.  Patient discharged [facility] in stable condition.    Prachi Britt RN    Administrations This Visit     0.9%  sodium chloride BOLUS     Admin Date  10/04/2020 Action  New Bag Dose  1,000 mL Rate   Route  Intravenous Administered By  Prachi Britt RN           Admin Date  10/04/2020 Action  New Bag Dose  1,000 mL Rate  1,000 mL/hr Route  Intravenous Administered By  Prachi Britt, RN                /73 (BP Location: Right arm)   Pulse 99   Temp 98.1  F (36.7  C) (Oral)   Resp 15   SpO2 98%        Unknown

## 2022-01-11 ENCOUNTER — LAB (OUTPATIENT)
Dept: LAB | Facility: CLINIC | Age: 32
End: 2022-01-11
Payer: MEDICARE

## 2022-01-11 DIAGNOSIS — N13.5 STRICTURE OR KINKING OF URETER: ICD-10-CM

## 2022-01-11 LAB
ANION GAP SERPL CALCULATED.3IONS-SCNC: 12 MMOL/L (ref 5–18)
BUN SERPL-MCNC: 20 MG/DL (ref 8–22)
CALCIUM SERPL-MCNC: 9.9 MG/DL (ref 8.5–10.5)
CHLORIDE BLD-SCNC: 111 MMOL/L (ref 98–107)
CO2 SERPL-SCNC: 16 MMOL/L (ref 22–31)
CREAT SERPL-MCNC: 0.95 MG/DL (ref 0.6–1.1)
ERYTHROCYTE [DISTWIDTH] IN BLOOD BY AUTOMATED COUNT: 13 % (ref 10–15)
GFR SERPL CREATININE-BSD FRML MDRD: 83 ML/MIN/1.73M2
GLUCOSE BLD-MCNC: 91 MG/DL (ref 70–125)
HCT VFR BLD AUTO: 41 % (ref 35–47)
HGB BLD-MCNC: 12.5 G/DL (ref 11.7–15.7)
MCH RBC QN AUTO: 27.9 PG (ref 26.5–33)
MCHC RBC AUTO-ENTMCNC: 30.5 G/DL (ref 31.5–36.5)
MCV RBC AUTO: 92 FL (ref 78–100)
PLATELET # BLD AUTO: 242 10E3/UL (ref 150–450)
POTASSIUM BLD-SCNC: 4.4 MMOL/L (ref 3.5–5)
RBC # BLD AUTO: 4.48 10E6/UL (ref 3.8–5.2)
SODIUM SERPL-SCNC: 139 MMOL/L (ref 136–145)
WBC # BLD AUTO: 8.1 10E3/UL (ref 4–11)

## 2022-01-11 PROCEDURE — 36415 COLL VENOUS BLD VENIPUNCTURE: CPT

## 2022-01-11 PROCEDURE — 85027 COMPLETE CBC AUTOMATED: CPT

## 2022-01-11 PROCEDURE — 80197 ASSAY OF TACROLIMUS: CPT

## 2022-01-11 PROCEDURE — 87799 DETECT AGENT NOS DNA QUANT: CPT

## 2022-01-11 PROCEDURE — 80048 BASIC METABOLIC PNL TOTAL CA: CPT

## 2022-01-12 ENCOUNTER — TELEPHONE (OUTPATIENT)
Dept: TRANSPLANT | Facility: CLINIC | Age: 32
End: 2022-01-12
Payer: MEDICARE

## 2022-01-12 DIAGNOSIS — Z94.0 KIDNEY REPLACED BY TRANSPLANT: Primary | ICD-10-CM

## 2022-01-12 NOTE — TELEPHONE ENCOUNTER
Gelacio Mcintyre MD Colianni, Lauren, RN  If she hasn't been having diarrhea I would just redraw her labs next week and if still <22 I can restart it             Previous Messages       ----- Message -----   From: Danielle Smith, RN   Sent: 1/12/2022   9:21 AM CST   To: Gelacio Mcintyre MD   Subject: bicarb                                           You stopped this pt's bicarb at her last appointment with you on 8/12/21. This week her CO2 came back at 16 and she is concerned and wants to restart it. Since it was a one off value (has been 21-22) do we wait for next draw or restart this medication temporarily. Let me know, thanks.     Danielle

## 2022-01-24 ENCOUNTER — HOSPITAL ENCOUNTER (OUTPATIENT)
Dept: CARDIOLOGY | Facility: HOSPITAL | Age: 32
Discharge: HOME | End: 2022-01-24
Attending: INTERNAL MEDICINE | Admitting: INTERNAL MEDICINE
Payer: MEDICARE

## 2022-01-24 DIAGNOSIS — I33.0 ACUTE BACTERIAL ENDOCARDITIS: ICD-10-CM

## 2022-01-24 DIAGNOSIS — N18.6 ESRD (END STAGE RENAL DISEASE) ON DIALYSIS (H): ICD-10-CM

## 2022-01-24 DIAGNOSIS — Z99.2 ESRD (END STAGE RENAL DISEASE) ON DIALYSIS (H): ICD-10-CM

## 2022-01-24 DIAGNOSIS — R07.2 PRECORDIAL PAIN: ICD-10-CM

## 2022-01-24 DIAGNOSIS — R06.09 DYSPNEA ON EXERTION: ICD-10-CM

## 2022-01-24 PROCEDURE — 93325 DOPPLER ECHO COLOR FLOW MAPG: CPT | Mod: 26 | Performed by: INTERNAL MEDICINE

## 2022-01-24 PROCEDURE — 93325 DOPPLER ECHO COLOR FLOW MAPG: CPT | Mod: TC

## 2022-01-24 PROCEDURE — 93018 CV STRESS TEST I&R ONLY: CPT | Performed by: INTERNAL MEDICINE

## 2022-01-24 PROCEDURE — 93350 STRESS TTE ONLY: CPT | Mod: 26 | Performed by: INTERNAL MEDICINE

## 2022-01-24 PROCEDURE — 93321 DOPPLER ECHO F-UP/LMTD STD: CPT | Mod: 26 | Performed by: INTERNAL MEDICINE

## 2022-01-24 PROCEDURE — 93321 DOPPLER ECHO F-UP/LMTD STD: CPT | Mod: TC

## 2022-01-24 PROCEDURE — 93016 CV STRESS TEST SUPVJ ONLY: CPT | Performed by: INTERNAL MEDICINE

## 2022-01-24 PROCEDURE — 93350 STRESS TTE ONLY: CPT | Mod: TC

## 2022-01-25 DIAGNOSIS — R01.1 SYSTOLIC MURMUR: Primary | ICD-10-CM

## 2022-01-31 ENCOUNTER — LAB (OUTPATIENT)
Dept: LAB | Facility: HOSPITAL | Age: 32
End: 2022-01-31
Payer: MEDICARE

## 2022-01-31 ENCOUNTER — TELEPHONE (OUTPATIENT)
Dept: TRANSPLANT | Facility: CLINIC | Age: 32
End: 2022-01-31
Payer: MEDICARE

## 2022-01-31 DIAGNOSIS — Z79.899 ENCOUNTER FOR LONG-TERM CURRENT USE OF MEDICATION: ICD-10-CM

## 2022-01-31 DIAGNOSIS — N39.0 URINARY TRACT INFECTION: Primary | ICD-10-CM

## 2022-01-31 DIAGNOSIS — N39.0 URINARY TRACT INFECTION: ICD-10-CM

## 2022-01-31 DIAGNOSIS — Z94.0 KIDNEY REPLACED BY TRANSPLANT: ICD-10-CM

## 2022-01-31 DIAGNOSIS — Z48.298 AFTERCARE FOLLOWING ORGAN TRANSPLANT: ICD-10-CM

## 2022-01-31 DIAGNOSIS — Z94.0 KIDNEY REPLACED BY TRANSPLANT: Primary | ICD-10-CM

## 2022-01-31 LAB
ALBUMIN MFR UR ELPH: <7 MG/DL
ALBUMIN UR-MCNC: NEGATIVE MG/DL
ANION GAP SERPL CALCULATED.3IONS-SCNC: 8 MMOL/L (ref 5–18)
APPEARANCE UR: CLEAR
BILIRUB UR QL STRIP: NEGATIVE
BUN SERPL-MCNC: 26 MG/DL (ref 8–22)
CALCIUM SERPL-MCNC: 9.7 MG/DL (ref 8.5–10.5)
CHLORIDE BLD-SCNC: 108 MMOL/L (ref 98–107)
CO2 SERPL-SCNC: 23 MMOL/L (ref 22–31)
COLOR UR AUTO: ABNORMAL
CREAT SERPL-MCNC: 1 MG/DL (ref 0.6–1.1)
CREAT UR-MCNC: 95 MG/DL
ERYTHROCYTE [DISTWIDTH] IN BLOOD BY AUTOMATED COUNT: 12.9 % (ref 10–15)
GFR SERPL CREATININE-BSD FRML MDRD: 78 ML/MIN/1.73M2
GLUCOSE BLD-MCNC: 96 MG/DL (ref 70–125)
GLUCOSE UR STRIP-MCNC: NEGATIVE MG/DL
HCT VFR BLD AUTO: 41.1 % (ref 35–47)
HGB BLD-MCNC: 12.6 G/DL (ref 11.7–15.7)
HGB UR QL STRIP: ABNORMAL
KETONES UR STRIP-MCNC: NEGATIVE MG/DL
LEUKOCYTE ESTERASE UR QL STRIP: NEGATIVE
MCH RBC QN AUTO: 28 PG (ref 26.5–33)
MCHC RBC AUTO-ENTMCNC: 30.7 G/DL (ref 31.5–36.5)
MCV RBC AUTO: 91 FL (ref 78–100)
MUCOUS THREADS #/AREA URNS LPF: PRESENT /LPF
NITRATE UR QL: NEGATIVE
PH UR STRIP: 5.5 [PH] (ref 5–7)
PLATELET # BLD AUTO: 235 10E3/UL (ref 150–450)
POTASSIUM BLD-SCNC: 4.1 MMOL/L (ref 3.5–5)
PROT/CREAT 24H UR: NORMAL MG/G{CREAT}
RBC # BLD AUTO: 4.5 10E6/UL (ref 3.8–5.2)
RBC URINE: 1 /HPF
SODIUM SERPL-SCNC: 139 MMOL/L (ref 136–145)
SP GR UR STRIP: 1.02 (ref 1–1.03)
TACROLIMUS BLD-MCNC: 5.8 UG/L (ref 5–15)
TME LAST DOSE: NORMAL H
TME LAST DOSE: NORMAL H
UROBILINOGEN UR STRIP-MCNC: <2 MG/DL
WBC # BLD AUTO: 7.8 10E3/UL (ref 4–11)
WBC URINE: <1 /HPF

## 2022-01-31 PROCEDURE — 36415 COLL VENOUS BLD VENIPUNCTURE: CPT

## 2022-01-31 PROCEDURE — 86833 HLA CLASS II HIGH DEFIN QUAL: CPT

## 2022-01-31 PROCEDURE — 87086 URINE CULTURE/COLONY COUNT: CPT

## 2022-01-31 PROCEDURE — 80048 BASIC METABOLIC PNL TOTAL CA: CPT

## 2022-01-31 PROCEDURE — 81001 URINALYSIS AUTO W/SCOPE: CPT

## 2022-01-31 PROCEDURE — 86832 HLA CLASS I HIGH DEFIN QUAL: CPT

## 2022-01-31 PROCEDURE — 85014 HEMATOCRIT: CPT

## 2022-01-31 PROCEDURE — 80197 ASSAY OF TACROLIMUS: CPT

## 2022-01-31 PROCEDURE — 84156 ASSAY OF PROTEIN URINE: CPT

## 2022-01-31 NOTE — TELEPHONE ENCOUNTER
[facility] lab called stated the patient would like an order for her urine and be coming in in an hour. Lab would like the order put into Epic and then faxed to [phone].

## 2022-01-31 NOTE — LETTER
OUTPATIENT LABORATORY TEST ORDER    Patient Name: Mona Wagner  Transplant Date: 8/3/2019   YOB: 1990                                Issue Date & Time:1/31/2022  9:54 AM  [facility] MR: [id number] Exp. Date (1 year after date issued)      Diagnoses: Kidney Transplant (ICD-10  Z94.0)   Long term use of medications (ICD-10  Z79.899)     Lab results to be available on the same day drawn.   Patient should release information to the [facility].  Please fax to the Transplant Center at [phone].    Every other month   ?Hemogram and Platelet  ?Basic Metabolic Panel (Sodium, Potassium, Chloride, CO2, Creatinine, Urea Nitrogen,     Glucose,   Calcium)        ?/Tacrolimus/Prograf drug level                         Every 6 Months                  ?Urine for protein/creatinine    Yearly:     ?PRA/DSA level (mailers provided by the patient)     If you have any questions, please call The [facility] at [phone] or [phone].    Please fax labs to [phone]  .

## 2022-02-01 LAB
BACTERIA UR CULT: NO GROWTH
DONOR IDENTIFICATION: NORMAL
DSA COMMENTS: NORMAL
DSA PRESENT: NO
DSA TEST METHOD: NORMAL
ORGAN: NORMAL
SA 1 CELL: NORMAL
SA 1 TEST METHOD: NORMAL
SA 2 CELL: NORMAL
SA 2 TEST METHOD: NORMAL
SA1 HI RISK ABY: NORMAL
SA1 MOD RISK ABY: NORMAL
SA2 HI RISK ABY: NORMAL
SA2 MOD RISK ABY: NORMAL
UNACCEPTABLE ANTIGENS: NORMAL
UNOS CPRA: 25
ZZZSA 1  COMMENTS: NORMAL
ZZZSA 2 COMMENTS: NORMAL

## 2022-02-11 ENCOUNTER — HOSPITAL ENCOUNTER (OUTPATIENT)
Dept: CARDIOLOGY | Facility: HOSPITAL | Age: 32
Discharge: HOME | End: 2022-02-11
Attending: INTERNAL MEDICINE | Admitting: INTERNAL MEDICINE
Payer: MEDICARE

## 2022-02-11 DIAGNOSIS — R01.1 SYSTOLIC MURMUR: ICD-10-CM

## 2022-02-11 DIAGNOSIS — N18.6 ESRD (END STAGE RENAL DISEASE) ON DIALYSIS (H): ICD-10-CM

## 2022-02-11 DIAGNOSIS — Z99.2 ESRD (END STAGE RENAL DISEASE) ON DIALYSIS (H): ICD-10-CM

## 2022-02-11 LAB — LVEF ECHO: NORMAL

## 2022-02-11 PROCEDURE — 93306 TTE W/DOPPLER COMPLETE: CPT

## 2022-02-11 PROCEDURE — 93306 TTE W/DOPPLER COMPLETE: CPT | Mod: 26 | Performed by: INTERNAL MEDICINE

## 2022-02-14 DIAGNOSIS — Z48.298 AFTERCARE FOLLOWING ORGAN TRANSPLANT: Primary | ICD-10-CM

## 2022-02-14 RX ORDER — DIPHENHYDRAMINE HCL 25 MG
25 CAPSULE ORAL DAILY PRN
Qty: 60 CAPSULE | Refills: 0 | Status: SHIPPED | OUTPATIENT
Start: 2022-02-14 | End: 2023-04-13

## 2022-02-18 DIAGNOSIS — R12 HEARTBURN: Primary | ICD-10-CM

## 2022-02-18 RX ORDER — FAMOTIDINE 20 MG/1
20 TABLET, FILM COATED ORAL 2 TIMES DAILY
Qty: 60 TABLET | Refills: 3 | Status: SHIPPED | OUTPATIENT
Start: 2022-02-18 | End: 2022-09-28

## 2022-03-10 ENCOUNTER — MYC MEDICAL ADVICE (OUTPATIENT)
Dept: FAMILY MEDICINE | Facility: CLINIC | Age: 32
End: 2022-03-10
Payer: MEDICARE

## 2022-03-16 ENCOUNTER — TELEPHONE (OUTPATIENT)
Dept: TRANSPLANT | Facility: CLINIC | Age: 32
End: 2022-03-16
Payer: MEDICARE

## 2022-03-16 DIAGNOSIS — Z78.9 ATTEMPTING TO CONCEIVE: Primary | ICD-10-CM

## 2022-03-16 NOTE — TELEPHONE ENCOUNTER
Post Kidney and Pancreas Transplant Team Conference  Date: 3/16/2022  Transplant Coordinator: Danielle Smith     Attendees:  [x]  Dr. Hill [x] Danielle Smith, RN [x] Leonela Brewster LPN     [x]  Dr. Wilson [x] Cinthia Cox, ERMELINDA [] Kiersten Chang LPN   [x]  Dr. Adams [x] Angelina Garvin, ERMELINDA    [x]  Dr. Mcintyre [] Keeley Li RN [x] Anthony Hendricks, PharmD   [x] Dr. Cervantes [] Jennifer Trejo, ERMELINDA    [] Dr. Durbin [] Virgilio Ryan RN    [x] Dr. Pinto [] Ophelia Oneal, ERMELINDA [x] Melani Vazquez RN   [] Dr. Delgadillo [] Yesika Wong RN    []  Dr. Ruiz [] Delicia Bonner RN    [] Dr. Johnson [x] Nancy Maldonado RN    [x] Johana Linda NP [] Nathalia Moreno RN        Verbal Plan Read Back:   Patient to be seen by high risk OB due to wanting to conceive    Routed to RN Coordinator   Leonela Brewster LPN

## 2022-03-22 ENCOUNTER — TRANSCRIBE ORDERS (OUTPATIENT)
Dept: MATERNAL FETAL MEDICINE | Facility: CLINIC | Age: 32
End: 2022-03-22
Payer: MEDICARE

## 2022-03-22 DIAGNOSIS — O26.90 PREGNANCY RELATED CONDITION, ANTEPARTUM: Primary | ICD-10-CM

## 2022-03-31 DIAGNOSIS — Z94.0 KIDNEY TRANSPLANTED: ICD-10-CM

## 2022-03-31 DIAGNOSIS — Z48.298 AFTERCARE FOLLOWING ORGAN TRANSPLANT: ICD-10-CM

## 2022-03-31 DIAGNOSIS — Z79.899 ENCOUNTER FOR LONG-TERM CURRENT USE OF MEDICATION: ICD-10-CM

## 2022-03-31 DIAGNOSIS — E55.9 VITAMIN D DEFICIENCY: Primary | ICD-10-CM

## 2022-03-31 DIAGNOSIS — D84.9 IMMUNOSUPPRESSED STATUS (H): ICD-10-CM

## 2022-03-31 DIAGNOSIS — Z94.0 KIDNEY REPLACED BY TRANSPLANT: ICD-10-CM

## 2022-04-01 ENCOUNTER — LAB (OUTPATIENT)
Dept: LAB | Facility: HOSPITAL | Age: 32
End: 2022-04-01
Payer: MEDICARE

## 2022-04-01 ENCOUNTER — DOCUMENTATION ONLY (OUTPATIENT)
Dept: SLEEP MEDICINE | Facility: CLINIC | Age: 32
End: 2022-04-01

## 2022-04-01 DIAGNOSIS — Z94.0 KIDNEY REPLACED BY TRANSPLANT: ICD-10-CM

## 2022-04-01 DIAGNOSIS — Z48.298 AFTERCARE FOLLOWING ORGAN TRANSPLANT: ICD-10-CM

## 2022-04-01 DIAGNOSIS — D84.9 IMMUNOSUPPRESSED STATUS (H): ICD-10-CM

## 2022-04-01 DIAGNOSIS — Z94.0 KIDNEY TRANSPLANTED: ICD-10-CM

## 2022-04-01 DIAGNOSIS — Z79.899 ENCOUNTER FOR LONG-TERM CURRENT USE OF MEDICATION: ICD-10-CM

## 2022-04-01 DIAGNOSIS — E55.9 VITAMIN D DEFICIENCY: ICD-10-CM

## 2022-04-01 LAB
ANION GAP SERPL CALCULATED.3IONS-SCNC: 9 MMOL/L (ref 5–18)
BUN SERPL-MCNC: 19 MG/DL (ref 8–22)
CALCIUM SERPL-MCNC: 10.3 MG/DL (ref 8.5–10.5)
CALCIUM, IONIZED MEASURED: 1.27 MMOL/L (ref 1.11–1.3)
CHLORIDE BLD-SCNC: 110 MMOL/L (ref 98–107)
CO2 SERPL-SCNC: 20 MMOL/L (ref 22–31)
CREAT SERPL-MCNC: 0.79 MG/DL (ref 0.6–1.1)
ERYTHROCYTE [DISTWIDTH] IN BLOOD BY AUTOMATED COUNT: 12.9 % (ref 10–15)
GFR SERPL CREATININE-BSD FRML MDRD: >90 ML/MIN/1.73M2
GLUCOSE BLD-MCNC: 94 MG/DL (ref 70–125)
HCT VFR BLD AUTO: 39.9 % (ref 35–47)
HGB BLD-MCNC: 12.4 G/DL (ref 11.7–15.7)
ION CA PH 7.4: 1.25 MMOL/L (ref 1.11–1.3)
MCH RBC QN AUTO: 28.3 PG (ref 26.5–33)
MCHC RBC AUTO-ENTMCNC: 31.1 G/DL (ref 31.5–36.5)
MCV RBC AUTO: 91 FL (ref 78–100)
PH: 7.36 (ref 7.35–7.45)
PHOSPHATE SERPL-MCNC: 2.2 MG/DL (ref 2.5–4.5)
PLATELET # BLD AUTO: 206 10E3/UL (ref 150–450)
POTASSIUM BLD-SCNC: 4.4 MMOL/L (ref 3.5–5)
PTH-INTACT SERPL-MCNC: 365 PG/ML (ref 10–86)
RBC # BLD AUTO: 4.38 10E6/UL (ref 3.8–5.2)
SODIUM SERPL-SCNC: 139 MMOL/L (ref 136–145)
TACROLIMUS BLD-MCNC: 5.4 UG/L (ref 5–15)
TME LAST DOSE: NORMAL H
TME LAST DOSE: NORMAL H
WBC # BLD AUTO: 7.2 10E3/UL (ref 4–11)

## 2022-04-01 PROCEDURE — 82330 ASSAY OF CALCIUM: CPT

## 2022-04-01 PROCEDURE — 83970 ASSAY OF PARATHORMONE: CPT

## 2022-04-01 PROCEDURE — 36415 COLL VENOUS BLD VENIPUNCTURE: CPT

## 2022-04-01 PROCEDURE — 87799 DETECT AGENT NOS DNA QUANT: CPT

## 2022-04-01 PROCEDURE — 85014 HEMATOCRIT: CPT

## 2022-04-01 PROCEDURE — 80048 BASIC METABOLIC PNL TOTAL CA: CPT

## 2022-04-01 PROCEDURE — 84100 ASSAY OF PHOSPHORUS: CPT

## 2022-04-01 PROCEDURE — 80197 ASSAY OF TACROLIMUS: CPT

## 2022-04-01 NOTE — PROGRESS NOTES
Patient was offered choice of vendor and chose [facility].  Patient Mona Wagner was set up at [facility]  on April 1, 2022. Patient received a Resmed Airsense 11 Pressures were set at 5-15 cm H2O.   Patient s ramp is 5 cm H2O for Auto and FLEX/EPR is 2.  Patient received a Resmed Mask name: AIRFIT N20  Nasal mask size Medium, heated tubing and heated humidifier.  Patient does need to meet compliance. Patient has a follow up on 6/17/2022 with Dr. Lyon.    Damaris Phan

## 2022-04-04 ENCOUNTER — DOCUMENTATION ONLY (OUTPATIENT)
Dept: SLEEP MEDICINE | Facility: CLINIC | Age: 32
End: 2022-04-04
Payer: MEDICARE

## 2022-04-04 LAB — BKV DNA # SPEC NAA+PROBE: NOT DETECTED COPIES/ML

## 2022-04-04 NOTE — PROGRESS NOTES
3 day Sleep therapy management telephone visit    Diagnostic AHI: 9.4  PSG    Confirmed with patient at time of call- Yes Patient is still interested in STM service       Subjective measures: Patient said it's going well so far. She notices a mask leak on her side.         Objective data     Order Settings for PAP  CPAP min 5    CPAP max 15   Device settings from machine CPAP min 5.0     CPAP max 15.0      EPR Setting TWO    RESMED soft response  OFF     Assessment: Nightly usage over four hours      Action plan: Patient to have 14 day STM visit. Patient has a follow up visit scheduled:   yes within 31-90 days of set up    Replacement device: No  STM ordered by provider: Yes     Total time spent on accessing and  interpreting remote patient PAP therapy data  10 minutes    Total time spent counseling, coaching  and reviewing PAP therapy data with patient  3 minutes    [id number] no

## 2022-04-05 ENCOUNTER — PRE VISIT (OUTPATIENT)
Dept: MATERNAL FETAL MEDICINE | Facility: CLINIC | Age: 32
End: 2022-04-05
Payer: MEDICARE

## 2022-04-06 NOTE — PROGRESS NOTES
Maternal-Fetal Medicine Consultation    Mona Wagner  : 1990  MRN: [id number]    REFERRAL:  Mona Wagner is a 31 year old sent by Dr. 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  Prior to transplant with history of hypertension, anemia of chronic disease, DVT of upper extremity associated with dialysis catheter, seizure associated with SAH while on anticoagulation, hyperprolactinemia, pyelonephritis of transplant, bacterial endocarditis.    She is here with her partner.    Patient was previously seen for pre conception consultation 2020.  She has not attempted pregnancy yet but is considering doing so soon.  She wishes to update her consultation prior to attempting pregnancy.  She is currently using [employer].    Ms. Wagner has a history of ESRD due to suspected IgA nephropathy and was previously on dialysis.  She underwent  donor kidney transplant in 2019 with Dr. oJhnson.  She follows with Dr. Hill and Dr. Mcintyre, last visit 2021.  The graft is located in the right iliac fossa.  At last nephrology visit stable kidney function was noted with baseline creatinine 1.0-1.2, most recently 0.79.  Minimal proteinuria.  No history of BK viremia.  Kidney transplant biopsy 2019 without evidence of acute rejection.  She is on Tacrolimus and Mycophenolic acid.  Per Dr. Mcintyre, when cleared for pregnancy by urology would decrease MPA to 360 mg BID x 3 days while starting  mg daily, stop MPA after those 3 days, Prednisone 10 mg daily x 5 days.  Recommend waiting at least 3 months after switching to attempt pregnancy.  She has secondary renal hyperparathyroidism with mildly elevated PTH level (151-300 pg/mL) on Cinacalcet.   She is not on vitamin D or calcium supplementation.  Vitamin D low, calcium normal.  Electrolytes WNL.  She is not on bicarbonate.  ABO and antibody screen 08/2019 O positive negative antibody.  She still has fistula in place.    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  She has had stenting at times, removed last 02/2021.  Imaging has not shown any evidence of filling defects or strictures.  She is trying to avoid further stents due to recurrent UTIs.  Allograft US 07/2021 with moderate hydronephrosis unchanged with voiding.  She follows closely with urology, last visit with Dr. Britt 05/2021.  Per their documentation recent renal US showed stable moderate hydronephrosis, stable Creatinine, and no obstruction on Lasix renogram.  Per Dr. Britt, risk for worsening hydronephrosis with extrinsic compression in pregnancy that may require ureteral stent versus percutaneous nephrostomy.  However if patient understands and accepts the risk it is reasonable to attempt conception.    Regarding her cardiac status, she has a history of bacterial endocarditis of pulmonary valve.  This was successfully treated in 01/2019 with 4 weeks of IV antibiotics.  She had an echocardiogram 02/2019 that showed resolution of vegetation.  She saw Dr. Toledo of cardiology for evaluation 12/7/21 at which time she reported not being particularly active and some shortness of breath with exertion such as stair climbing.  She also reported some mild chest discomfort in her left upper chest every couple of months lasting a few minutes.  This is not predictably brought on with physical activity.  She denied palpitations, syncope.  Dr. Toledo noted a systolic murmur and recommended a cardiac stress echocardiogram. Echo performed 2/11/22 with EF 60-65%, normal LV wall motion and function, normal RV size and systolic function, no hemodynamically significant valvular  abnormalities.  Stress echo completed 1/24/22 with patient achieving 79% of maximum predicted HR, reduced exercise tolerance for age.  This was non diagnostic given less than 85% of maximum heart rate achieved however no changes to suggest ischemia.    She was treated for hypertension while on dialysis but following transplant has not needed antihypertensives.  She takes atorvastatin 10 mg / day and plans to stop in pregnancy.  ECG 01/2020 with sinus tachycardia, incomplete R BBB, no significant ST-T wave changes.  She now has orthostatic hypotension for which she takes Florinef 0.1 mg MWF.    She recently initiated CPAP for FATOUMATA.    She follows with Dr. Coles of endocrinology, last visit 11/16/21.  At that time her history of hypothyroidism, hyperprolactinemia, and possible pituitary adenoma were discussed.  In brief, she initially presented with galactorrhea that improved upon starting thyroid hormone replacement in 2014.  At that time prolactin levels were elevated but stable.  Per endocrinology, ESKD at that time could explain the elevated prolactin levels relative to low clearance.  MRI w/ contrast in 2017 showed a possible 6.7-5.3 mm microadenoma that was not confirmed in the subsequent MRI.  However, due to her kidney dysfunction, the study had to be done without contrast.  Endocrinology stated that it was unclear if the patient truly had a prolactin producing microadenoma or her hyperprolactinemia was due to ESKD and/or hypothyroidism.  The patient never saw a neurosurgeon or experienced peripheral vision changes.  Most recent prolactin just above normal range.  Per endocrinology, the normalization of her PRL levels with treatment of hypothyroidism and normalization of her kidney function after her transplant suggests that the patient does not have a pituitary adenoma that is secreting prolactin, and that this was rather functional hyperprolactinemia, likely related to hypothyroidism and end-stage kidney  disease.  Plan is to continue to monitor annually and consider treatment with cabergoline if indicated in the future.    She takes levothyroxine regularly, currently on 25 mcg 4 times per week and 37.5 mcg 3 times per week.  Last TSH 2.49 11/2021.  Plan to increase levothyroxine dose to 1.5 of the 25 mcg tablets daily and repeat labs in 6 to 8 weeks if she becomes pregnant.    She does have a history of upper extremity DVT associated with dialysis catheter site in 2014 for which she was on anticoagulation.  She previously had testing including cardiolipin, lupus anticoagulant, Factor 2 and Factor V Leiden testing that were negative.  She also had two seizures in 2014 associated with SAH while on anticoagulation.  She was on Keppra for years, but was seen by neurology and this was discontinued.  She has had no further seizures or DVT/PE.    She notes history of bicornuate uterus.    Ob/Gynecologic History:  - Menstrual history: No LMP recorded. (Menstrual status: Birth Control).  - Last Pap: 03/2020 NILM HPV negative  - Denies any history of abnormal pap smears  - Denies prior cervical surgery or procedures  - Denies any history of vaginal infection or STIs    Past Medical History:  Past Medical History:   Diagnosis Date     Anemia in chronic kidney disease      History of bacterial endocarditis      History of blood transfusion      History of DVT (deep vein thrombosis) 2014     History of seizure 2014     History of subarachnoid hemorrhage      Hydronephrosis      Hyperprolactinemia (H)      Hypertension     resolved after transplant     Hypothyroidism      Kidney transplanted 08/03/2019    DCD DDKT. Induction with thymo 6mg/kg.     Orthostatic hypotension      FATOUMATA (obstructive sleep apnea)      Pyelonephritis of transplanted kidney 01/23/2020     Secondary renal hyperparathyroidism (H)      Urinary tract infection        Past Surgical History:  Past Surgical History:   Procedure Laterality Date     BENCH KIDNEY  N/A 8/3/2019    Procedure: BACKBENCH PREPARATION, ALLOGRAFT, KIDNEY;  Surgeon: Droian Johnson MD;  Location: [facility] OR     COMBINED CYSTOSCOPY, RETROGRADES, EXCHANGE STENT URETER(S) Right 2020    Procedure: CYSTOSCOPY, CYSOTGRAM, WITH RETROGRADE PYELOGRAM, ureteroscopy,  AND URETERAL STENT;  Surgeon: Wally Britt MD;  Location: [facility] OR     COMBINED CYSTOSCOPY, RETROGRADES, URETEROSCOPY, LASER HOLMIUM LITHOTRIPSY URETER(S), INSERT STENT N/A 2019    Procedure: CYSTOURETEROSCOPY, WITH RETROGRADE PYELOGRAM of transplant kidney, STENT INSERTION, Laser on standby;  Surgeon: Wally Britt MD;  Location: [facility] OR     CREATE FISTULA ARTERIOVENOUS UPPER EXTREMITY  2014    Procedure: CREATE FISTULA ARTERIOVENOUS UPPER EXTREMITY;  Left Wrist Arteriovenous Fistula Placement;  Surgeon: Shashi Castro MD;  Location: [facility] OR     CREATE FISTULA ARTERIOVENOUS UPPER EXTREMITY       CYSTOSCOPY, RETROGRADES, INSERT STENT URETER(S), COMBINED N/A 2020    Procedure: CYSTOSCOPY, WITH RETROGRADE PYELOGRAM, CYSTOGRAM AND URETERAL STENT INSERTION - TRANSPLANT KIDNEY;  Surgeon: Wally Britt MD;  Location: [facility] OR     IR CEREBRAL ANGIOGRAM  2014     PERCUTANEOUS BIOPSY KIDNEY Right 2019    Procedure: Right Kidney Biopsy;  Surgeon: Deny Rios MD;  Location: [facility] OR     RASTA/DIALYSIS CATHETER  12/10/2013          TRANSPLANT KIDNEY RECIPIENT  DONOR N/A 8/3/2019    Procedure: TRANSPLANT, KIDNEY, RECIPIENT,  DONOR with Ureteral Stent Placement;  Surgeon: Dorian Johnson MD;  Location: [facility] OR       Current Medications:  Prior to Admission medications    Medication Sig Last Dose Taking? Auth Provider   acetaminophen (TYLENOL) 325 MG tablet Take 2 tablets (650 mg) by mouth every 4 hours as needed for mild pain   Macarena Bowen MD   ammonium lactate (AMLACTIN) 12 % external cream Apply twice a day as needed to rough bumpy skin.   Santos Guadalupe MD   aspirin (ASA)  81 MG chewable tablet Take 1 tablet (81 mg) by mouth daily   Gelacio Mcitnyre MD   atorvastatin (LIPITOR) 10 MG tablet Take 1 tablet (10 mg) by mouth every morning   Nakul Hill MD   cinacalcet (SENSIPAR) 30 MG tablet Take 1 tablet (30 mg) by mouth daily   Gelacio Mcintyre MD   diphenhydrAMINE (BENADRYL) 25 MG capsule Take 1 capsule (25 mg) by mouth daily as needed for itching or allergies Take 1-2 tablets by mouth every 6 hours PRN itching/allergies   Macarena Bowen MD   famotidine (PEPCID) 20 MG tablet Take 1 tablet (20 mg) by mouth 2 times daily   Macarena Bowen MD   fludrocortisone (FLORINEF) 0.1 MG tablet Take 1 tablet (0.1 mg) by mouth Every Mon, Wed, Fri Morning   Macarena Bowen MD   hydrOXYzine (ATARAX) 10 MG tablet Take 1 tablet (10 mg) by mouth 3 times daily as needed for anxiety   Ondina Deleon APRN CNP   lactobacillus rhamnosus, GG, (CULTURELL) capsule Take 1 capsule by mouth 2 times daily    Dean Wilson MD   levothyroxine (SYNTHROID/LEVOTHROID) 25 MCG tablet TAKE 1 TABLET(25MCG) BY MOUTH TUES, THUR, SAT AND SUN AND 1 A ND 1/ 2 TABLETS( 37.5 MCG) ON MON WED AND FRI   Melissa Coles MD   MYFORTIC (BRAND) 180 MG EC tablet Take 3 tablets (540 mg) by mouth 2 times daily   Dean Wilson MD   norethindrone (MICRONOR) 0.35 MG tablet Take one tablet daily   Macarena Bowen MD   senna-docusate (SENOKOT-S/PERICOLACE) 8.6-50 MG tablet Take 1-2 tablets by mouth 2 times daily  Patient taking differently: Take 1-2 tablets by mouth 2 times daily as needed    Wally Britt MD   simethicone (MYLICON) 125 MG chewable tablet Take 1 tablet (125 mg) by mouth 4 times daily as needed for intestinal gas   Macarena Bowen MD   tacrolimus (GENERIC EQUIVALENT) 0.5 MG capsule HOLD   Nakul Hill MD   tacrolimus (GENERIC EQUIVALENT) 1 MG capsule Take 3 capsules (3 mg) by mouth 2 times daily   Nakul Hill MD        Allergies:  Contrast dye, Lisinopril, Nitrous oxide, Chlorhexidine, Sulfa drugs, Dapsone, Furosemide, Metrogel [metronidazole], Azithromycin, and Cefuroxime    Social History:   Social History     Tobacco Use     Smoking status: Never Smoker     Smokeless tobacco: Never Used   Substance Use Topics     Alcohol use: No     Alcohol/week: 0.0 standard drinks     Drug use: No     Occupation: pharmacy tech, works several shifts per week  Social status:      Family History:  Family History   Problem Relation Age of Onset     Kidney Disease Mother      Kidney failure Mother      Coronary Artery Disease Father      Cerebrovascular Disease Father      Heart Disease Father      Sleep Apnea Father      Hypertension Sister      Diabetes Sister      Cerebrovascular Disease Sister      Kidney Disease Sister      Breast Cancer No family hx of      Cancer - colorectal No family hx of      Ovarian Cancer No family hx of      Prostate Cancer No family hx of      Other Cancer No family hx of      Asthma No family hx of      Anesthesia Reaction No family hx of      Deep Vein Thrombosis (DVT) No family hx of      Melanoma No family hx of      Skin Cancer No family hx of       Denies history of genetic disorders, preeclampsia, thromboembolic disease, bleeding disorders, developmental delay.    ROS:  As per HPI, otherwise negative.    PHYSICAL EXAM:  Deferred    Labs:   22  PTH:  365  BK virus:  Not detected  Phosphorus:  2.2  Ionized calcium:  1.27  Tacrolimus:  5.4  Creatinine:  0.79  Na:  139  K:  4.4  Chloride:  110  CO2:  20  Anion gap:  9  BUN:  19  Calcium:  10.3  GFR >90  Hgb:  12.4  MCV:  91  Plt:  206    22  Total protein/creatinine urine TLTC  Urine culture no growth    21  25 OH Vit D total:  <19     Other Imagin/1/21  renal transplant:  Right lower quadrant renal transplant moderate  hydronephrosis unchanged with voiding.    5/10/21  renal transplant:  Stable moderate hydronephrosis in the  transplant kidney, unchanged after voiding.    2/11/22 echocardiogram:  Left ventricular size, wall motion and function are normal. The ejection fraction is 60-65%. Normal right ventricle size and systolic function. Normal left atrial size. No hemodynamically significant valvular abnormalities on 2D or color flow imaging.    1/24/22 stress echocardiogram:  Patient exercised 6:52 seconds stopping secondary to fatigue.  Patient achieved 79% of maximum predicted heart rate.  Reduced exercise tolerance for age.  Exercise ECG is non diagnositic secondary to less than 85% of maximum heart rate achieved. No changes to suggest ischemia at the heart rate/workload  achieved. Resting LV function is normal. Resting LVEF estimated at 55-60%. Post exercise appropriate augmentation of systolic function without observed regional wall motion abnormality. Technically limited post exercise images. Conclusions: Non diagnostic stress echocardiogram secondary to less than 85% of maximum heart rate achieved. No ECG or echocardiographic findings to suggest ischemia at the workload achieved.    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  Prior to transplant with history of hypertension, anemia of chronic disease, DVT of upper extremity associated with dialysis catheter, seizure associated with SAH while on anticoagulation, hyperprolactinemia, bacterial endocarditis.    Given remote history of seizure associated with SAH while on anticoagulation in 2014, this was not discussed today.  She has recovered from bacterial endocarditis with recent echocardiogram without evidence of valve vegetation; this was also not discussed today.  Hyperprolactinemia was functional likely related to hypothyroidism or ESKD prior to  transplant and has largely resolved and thus was not discussed today.    Renal transplant  Orthostatic hypotension requiring Florinef  Secondary renal hyperparathyroidism requiring Cinacalcet  Chronic hypertension prior to transplant, resolved  Anemia of chronic disease prior to transplant, resolved  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  We reviewed Ms. Wagner's history in detail regarding her renal transplant.  She continues on Myfortic and Tacrolimus.  She is planning a pregnancy soon and has an plan in place with Dr. Mcintyre outlining switching MPA to AZA at least 3 months prior to pregnancy.     We counseled the patient regarding pregnancy in the setting of a renal transplant. In regards to her immunosuppressive medications, tacrolimus, azathioprine and prednisone/fludrocortisone may be used safely during pregnancy and breast feeding. Mycophenolic acid is teratogenic and should be stopped at least three months prior to pregnancy. Tacrolimus levels will likely fluctuate with pregnancy and require increased monitoring. Use of corticosteroids slightly increases the risk of developing fetal cleft lip and palate.   Systemic corticosteroids may also influence fetal growth (decreased birth weight) however data is limited and conflicting.  She will need consideration for stress dose steroids in labor and at the time of delivery.     Patients also are at an increased risk for maternal complications in pregnancy, particularly elevated blood pressure and preeclampsia. Ms. Wagner has a history of chronic hypertension, though has been normotensive with orthostatic hypotension since transplant. We recommend baseline HELLP labs and a 24 hour urine protein as well as starting a low dose ASA 81mg daily at 12w to help decrease the risk. Ms. Wagner is already taking ASA 81 mg and it is safe to continue during early pregnancy as well. There is also an increased risk of developing gestational diabetes, and she should undergo  early GDM screening as well. There additionally is an increased risk for urinary tract infections and patients should be regularly screened for infection. Prophylactic antibiotics should be initiated after one infection in pregnancy in this population.    Pregnancy may have significant negative effects on renal disease and can change kidney function both temporarily and permanently.  Increases in proteinuria occur in about 50% of pregnancies, hypertension develops or worsens in about 25% of pregnancies which may lead to maternal injury such as stroke, or poor fetal outcome.  Women with nephrotic syndrome may develop worsening edema as the pregnancy progresses, however this should resolve with delivery.  Permanent decline in renal function occurs in up to 10% of pregnancies affected by mild renal disease (creatinine <1.5 mg/dL); women with hypertension seem to be at increased risk.     Overall, the long-term course of graft function does not appear to be affected by pregnancy. However, patients who did experience graft loss during or after pregnancy were noted to have a higher pre-pregnancy creatinine (BRIAN Holbrook. Pregnancy in Renal Transplant Recipients and Donors, Seminars in Nephrology 2017). Ms. Wagner has had a normal creatinine, which is reassuring. There is also the risk of graft rejection. If rejection is suspected, biopsy should be performed prior to 24w. If there is concern for rejection after 24w, empiric treatment should be initiated.     In terms of fetal outcomes, a study by Arina et al showed an increased rate of  birth and fetal growth restriction in patients with a renal transplant (Clinical Transplantation 2017). Vaginal delivery is not affected by the anatomic location of the graft.  section should be reserved for routine obstetrical indications.    Regarding hydronephrosis and history of recurrent UTI with stenting, we discussed that there is a risk for worsening hydronephrosis  with extrinsic compression in pregnancy that may require ureteral stent versus percutaneous nephrostomy.  If stenting is require she may required prophylactic antibiotics.  Her graft is located in the right iliac fossa.      Recommendations:  Preconception:    Continue close follow up with nephrology and transplant team. Recommend transition as outlined by Dr. Mcintyre from mycophenolate to AZA now.  Recommend she be stable on AZA at least 3 months prior to conception.    Discuss management of AV fistula with Dr. Mcintyre prior to pregnancy     During pregnancy:    Increased frequency of prenatal visits: every 2-4 weeks until the third trimester, then weekly thereafter    Initiation of low dose aspirin for prevention of preeclampsia between 12-16 weeks    Early detection and treatment of asymptomatic bacteriuria with urine culture at least every trimester    Close follow up with the Nephrology, transplant team and Maternal-Fetal Medicine    Assessment of renal function every 4-8 weeks    Monitor for signs and symptoms of preeclampsia    Monthly ultrasound for fetal growth after initial anatomy scan at 18 weeks     surveillance with twice weekly BPP (or NST and MVP) at 32 weeks gestation    Delivery is indicated based on secondary sequelae (e.g. hypertension, preeclampsia, fetal growth restriction, fetal distress) and underlying disease; generally 37 to 39 weeks gestation so as to avoid irreversible renal injury     reserved for obstetric indications      FATOUMATA  FATOUMATA has been associated with many pregnancy complications, especially if it is untreated, including gestational hypertension, preeclampsia (both of which may be confounded by obesity) and gestational diabetes, as well as  birth (which may partially be attributed to iatrogenic  birth in the setting of preeclampsia).  Studies that suggest an association with small for gestational age have been confounded by preeclampsia.  Importantly,  an association has not been found with fetal demise.  There is some evidence that pregnancy may exacerbate underling breathing disorders.  As in non-pregnant patients CPAP is first line treatment.  Limiting weight gain, especially in obese gravidas, may limit symptom progression.      Recommendations:    Continue CPAP and encourage compliance    Monitor BP closely    Early testing for diabetes which should be repeated at 24-28 weeks if normal    Anesthesia consultation in the 3rd trimester    Bring CPAP to hospital      Hypothyroidism  Thyroid requirements increase in pregnancy due to increases in metabolism and thyroid binding globulin.  Thyroid function tests, in particular TSH, should be followed closely to ensure adequate treatment.  Hypothyroidism has been associated with higher pregnancy complication rates including miscarriage, preeclampsia, abruption, low birth weight and stillbirth.  Untreated hypothyroidism has also been associated with adverse fetal neurological development, but well treated hypothyroidism (ie euthyroid state) carries an excellent prognosis for mother and baby.      Recommendations:     Maintain euthyroidism via treatment with levothyroxine, aimed at keeping the TSH at the desired range by trimester; dose requirements may increase by as much as 30-50%.      TSH should be checked q4-6 weeks in the first half of pregnancy and adjusted to trimester specific norms; it can be checked less often in the second half of pregnancy as long as dose adjustments are not necessary.      First trimester: 0.1 to 2.5 mIU/L    Second trimester: 0.2 to 3 mIU/L    Third trimester: 0.3 to 3 mIU/L    Continued follow-up with her endocrinologist throughout pregnancy.    Ultrasound for growth at 28-32 weeks     fetal testing and timing of delivery per routine obstetric guidelines if euthyroidism is maintained    After delivery levothyroxine can be reduced to pre-pregnancy levels, but serum TSH should be  monitored 4-6 weeks later to confirm appropriate dosing      DVT  Ms. Wagner has a remote history of a DVT of the upper extremity that was provoked at site of dialysis catheter.  She has had multiple complications when treated with anticoagulation in 2014 including subarachnoid hemorrhage and associated seizure.  We recommend surveillance without prophylactic anticoagulation in pregnancy in light of single provoked DVT unrelated to estrogen/pregnancy unless a thrombophilia is identified. She has had a partial evaluation for thrombophilia (factor 2 and V negative, lupus anticoagulant negative, cardiolipin negative).    Recommendations:    Assessment for antiphospholipid antibody syndrome with anticardiolipin IgG/IgM, beta-2-glycoprotein IgG/IgM and lupus anticoagulant.  It does not appear she has had beta 2 glycoprotein assessment.    Assessment for inherited thrombophilias including Factor V Leiden and Prothrombin Gene Mutation:   Negative.    Recommend assessment for Protein C and S deficiency and Antithrombin III deficiency.        Preconception  Medical and obstetrical problems may occur during any low- and high-risk pregnancy and may impact a woman's health status or quality of life.  These include hypertensive disorders, such as preeclampsia, gestational diabetes, vaginal bleeding (both placental abruption and placenta previa occur uniquely in pregnancy), prolonged hospitalization and surgical complications of pregnancy, such as .      Although most women proceed through pregnancy without complications, in rare cases, pregnancy results in significant long-term and life-threatening medical disease, such as peripartum cardiomyopathy (PPCM),  acute fatty liver of pregnancy (AFLP), placenta accreta, venous thromboembolism (VTE) and death.     Complications occur with increased frequency in multiple gestation.      Recommendations:    Optimization of the patient's comorbid conditions as outlined  above.    Avoidance of alcohol during conception.    Daily prenatal vitamin with folic acid.     Consultation with genetics if desired to screen for hereditable conditions.    Per ASCCP guidelines, in women with history of solid organ transplant <30 years of age, cytology is recommended performed annually.  If 3 consecutive cytology pap results are negative, perform cytology q3 years.  If >30 years of age cotesting is preferred.  If cytology is normal and HPV is negative, repeat cotesting q3 years.    Laboratory evaluation including:   o Hemoglobin A1C (if applicable)  o Comprehensive metabolic panel (if applicable)  o Rubella titer  o Varicella titer  o Hepatitis B surface antigen  o HIV testing  o Complete blood count  o Blood type and antibody status  o If the patient is Rh-negative, blood type determination of her partner is recommended.      Patient will follow with maternal-fetal medicine for primary obstetric care in future pregnancy.    Patient seen and staffed with Dr. Carie Montiel MD   Maternal-Fetal Medicine Fellow, PGY5  4/12/2022 1:40 PM

## 2022-04-12 ENCOUNTER — OFFICE VISIT (OUTPATIENT)
Dept: MATERNAL FETAL MEDICINE | Facility: CLINIC | Age: 32
End: 2022-04-12
Attending: INTERNAL MEDICINE
Payer: MEDICARE

## 2022-04-12 ENCOUNTER — MYC MEDICAL ADVICE (OUTPATIENT)
Dept: FAMILY MEDICINE | Facility: CLINIC | Age: 32
End: 2022-04-12

## 2022-04-12 DIAGNOSIS — Z31.69 PRE-CONCEPTION COUNSELING: Primary | ICD-10-CM

## 2022-04-12 DIAGNOSIS — Z31.69 ENCOUNTER FOR PRECONCEPTION CONSULTATION: Primary | ICD-10-CM

## 2022-04-12 DIAGNOSIS — O26.90 PREGNANCY RELATED CONDITION, ANTEPARTUM: ICD-10-CM

## 2022-04-12 PROCEDURE — 99204 OFFICE O/P NEW MOD 45 MIN: CPT | Mod: GC | Performed by: OBSTETRICS & GYNECOLOGY

## 2022-04-12 RX ORDER — PNV NO.95/FERROUS FUM/FOLIC AC 28MG-0.8MG
1 TABLET ORAL DAILY
Qty: 90 TABLET | Refills: 3 | Status: SHIPPED | OUTPATIENT
Start: 2022-04-12 | End: 2023-04-13

## 2022-04-12 NOTE — PROGRESS NOTES
Pt presents to [facility] for preconception consult due to hx of kidney transplant. Pt here with her . Pt met with Dr. Montiel and Dr. Darnell. See note in epic for today's consult discussion and recommendations. Questions answered. No further follow up at [facility] at this time. Discharged stable. Macarena Lord RN

## 2022-04-13 DIAGNOSIS — Z48.298 AFTERCARE FOLLOWING ORGAN TRANSPLANT: Primary | ICD-10-CM

## 2022-04-13 RX ORDER — PRENATAL VIT/IRON FUM/FOLIC AC 27MG-0.8MG
1 TABLET ORAL DAILY
Qty: 90 TABLET | Refills: 3 | Status: SHIPPED | OUTPATIENT
Start: 2022-04-13 | End: 2023-06-22

## 2022-04-18 ENCOUNTER — TELEPHONE (OUTPATIENT)
Dept: FAMILY MEDICINE | Facility: CLINIC | Age: 32
End: 2022-04-18
Payer: MEDICARE

## 2022-04-18 NOTE — TELEPHONE ENCOUNTER
Plan:   1) Routed to PCP as FYI  2) Routed to PCP Team to communicate the following to patient:    We received notification from your pharmacy that Se -  19  is not be covered by your insurance    Our clinic policy is to not pursue insurance coverage for this medicine as these requests are usually not approved. We recommend you purchase out of pocket if medicine desired. If you need assistance selecting the correct product, please ask your pharmacist for help.     If this plan does not work, please let us know and we will send a message to your doctor to see if another medicine is available to suit your needs.     Rosanna Key MA 2022 at 10:29 AM

## 2022-04-20 ENCOUNTER — DOCUMENTATION ONLY (OUTPATIENT)
Dept: SLEEP MEDICINE | Facility: CLINIC | Age: 32
End: 2022-04-20
Payer: MEDICARE

## 2022-04-20 ENCOUNTER — OFFICE VISIT (OUTPATIENT)
Dept: NEPHROLOGY | Facility: CLINIC | Age: 32
End: 2022-04-20
Attending: INTERNAL MEDICINE
Payer: MEDICARE

## 2022-04-20 ENCOUNTER — DOCUMENTATION ONLY (OUTPATIENT)
Dept: SLEEP MEDICINE | Facility: CLINIC | Age: 32
End: 2022-04-20

## 2022-04-20 VITALS
RESPIRATION RATE: 18 BRPM | SYSTOLIC BLOOD PRESSURE: 104 MMHG | HEART RATE: 95 BPM | WEIGHT: 154 LBS | TEMPERATURE: 97.7 F | DIASTOLIC BLOOD PRESSURE: 70 MMHG | BODY MASS INDEX: 29.57 KG/M2 | OXYGEN SATURATION: 98 %

## 2022-04-20 DIAGNOSIS — Z94.0 KIDNEY REPLACED BY TRANSPLANT: ICD-10-CM

## 2022-04-20 DIAGNOSIS — Z94.0 KIDNEY REPLACED BY TRANSPLANT: Primary | ICD-10-CM

## 2022-04-20 DIAGNOSIS — Z48.298 AFTERCARE FOLLOWING ORGAN TRANSPLANT: ICD-10-CM

## 2022-04-20 DIAGNOSIS — G47.33 OBSTRUCTIVE SLEEP APNEA (ADULT) (PEDIATRIC): Primary | ICD-10-CM

## 2022-04-20 DIAGNOSIS — D84.9 IMMUNOSUPPRESSION (H): Primary | ICD-10-CM

## 2022-04-20 PROCEDURE — G0463 HOSPITAL OUTPT CLINIC VISIT: HCPCS

## 2022-04-20 PROCEDURE — 99214 OFFICE O/P EST MOD 30 MIN: CPT | Mod: 95 | Performed by: INTERNAL MEDICINE

## 2022-04-20 RX ORDER — AZATHIOPRINE 50 MG/1
150 TABLET ORAL DAILY
Qty: 270 TABLET | Refills: 3 | Status: SHIPPED | OUTPATIENT
Start: 2022-04-20 | End: 2023-06-22

## 2022-04-20 RX ORDER — MYCOPHENOLIC ACID 180 MG/1
360 TABLET, DELAYED RELEASE ORAL 2 TIMES DAILY
Qty: 12 TABLET | Refills: 0 | Status: SHIPPED | OUTPATIENT
Start: 2022-04-20 | End: 2022-06-24

## 2022-04-20 RX ORDER — EPINEPHRINE 0.3 MG/.3ML
INJECTION SUBCUTANEOUS
Status: ON HOLD | COMMUNITY
Start: 2021-12-08 | End: 2023-06-10

## 2022-04-20 RX ORDER — PREDNISONE 10 MG/1
10 TABLET ORAL DAILY
Qty: 5 TABLET | Refills: 0 | Status: SHIPPED | OUTPATIENT
Start: 2022-04-20 | End: 2022-06-24

## 2022-04-20 RX ORDER — ACETAMINOPHEN AND CODEINE PHOSPHATE 120; 12 MG/5ML; MG/5ML
1 SOLUTION ORAL DAILY
COMMUNITY
Start: 2021-12-29 | End: 2022-04-20

## 2022-04-20 RX ORDER — AZATHIOPRINE 50 MG/1
150 TABLET ORAL DAILY
Qty: 90 TABLET | Refills: 11 | Status: SHIPPED | OUTPATIENT
Start: 2022-04-20 | End: 2022-04-20

## 2022-04-20 ASSESSMENT — PAIN SCALES - GENERAL: PAINLEVEL: NO PAIN (0)

## 2022-04-20 NOTE — PROGRESS NOTES
Patient came to [facility]  for mask fitting appointment on April 20, 2022. Patient requested to switch masks because oral breathing. Discussed the following masks: Airfit F20, Airfit F30I, Vitera.  Patient selected a Resmed Mask name: AirFit F30I Full Face mask size Small/Wide Cushion.    Toya BINGHAM

## 2022-04-20 NOTE — NURSING NOTE
Chief Complaint   Patient presents with     Consult     Follow Up - Kidney Transplant     /70   Pulse 95   Temp 97.7  F (36.5  C)   Resp 18   Wt 69.9 kg (154 lb)   SpO2 98%   BMI 29.57 kg/m    Ignacio Potts on 4/20/2022 at 11:38 AM

## 2022-04-20 NOTE — PROGRESS NOTES
Mona is a 30 year old who is being evaluated via a billable video visit.      How would you like to obtain your AVS? MyChart  If the video visit is dropped, the invitation should be resent by: Text to cell phone: [phone]  Will anyone else be joining your video visit? No    Video Start Time: 1:56 PM  Video-Visit Details    Type of service:  Video Visit    Video End Time:2:37pm    Originating Location (pt. Location): Home    Distant Location (provider location):  [facility] [place]     Platform used for Video Visit: [employer]    CHRONIC TRANSPLANT NEPHROLOGY VISIT      RECOMMENDATIONS     Start AZATHIOPRINE  150 mg daily   Reduce Mycophenolate from 540 mg 2 times daily to 360 mg  2 times daily for 3 days and then stop .  Start prednisone 10 mg daily for 5 days then stop   Wait for 3 months  from the day you stop mycophenolate , to start planning for pregnancy     For this 3 months , you have to continue birth control measures . After that talk to Maternal and fetal medicine team when it is safe for you to conceive after stopping your  Birth control medications .    Continue to hold  Cinacalcet. 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 Stent replaced most recently 11/23/20.    - Baseline Cr ~ 1.0-1.2, most recently 0.93   - Proteinuria: Minimal (0.2-0.5 grams)   - Date DSA Last Checked:  Aug/2021      Latest DSA: No   - BK Viremia: No Aug 2021   - Kidney Tx Biopsy: Sep 17, 2019; Result: No diagnostic evidence of acute rejection.      # Immunosuppression: Tacrolimus immediate release (goal 4-6) and Mycophenolic acid (dose 540 mg every 12 hours)   - Changes: No. When cleared for pregnancy by urology would decrease MPA to 360mg bid x3 days while starting AZA 150mg daily, stop MPA after those 3 days, giving prednisone 10mg daily x5 days. She would need to wait 6 weeks after switch to start trying to get pregnant    # Infection Prophylaxis:   Last CD4 Level: 201 ( July 2020)   - PJP: None;    # Orthostatic Hypotension: Controlled, but low at times on Florinef 0.1mg MWF;  Goal BP: > 100, but < 130 systolic   - Changes: No. Class C during pregnancy so would try to hold if she could tolerate     # Anemia in Chronic Renal Disease: Hgb: Stable, now normal      CHARLINE: No   - Iron studies: Replete ( 8/2020)     # Mineral Bone Disorder:   - Secondary renal hyperparathyroidism; PTH level: Mildly elevated (151-300 pg/ml)        On treatment: Cinacalcet.   - Vitamin D; level: Low        On Supplement: No; Not on treatment due to hypercalcemia  - Calcium; level: Normal        On Supplement: No    # Electrolytes:   - Potassium; level: Normal        On supplement: No  - Magnesium; level: Normal        On supplement: Yes  - Bicarbonate; level: Normal        On supplement: Yes, stop bicarb    # Recurrent UTI:   Recently had UTI  In July and treated with Augmentin , but later got ciprofloxacin  Follows with ID  Rpt UA 8/4 wnl    # Ureteral stenosis:   -Stent  Removed last Feb 2021 . Follows with urology . Trying to avoid further stents due to recurrent UTIs              -  Allograft US July 2021: moderate hydronephrosis unchanged with voiding.        # Hyperprolactinemia: Normal prolactin level with last check.  Felt secondary to hypothyroidism versus kidney failure, or less likely now a pituitary microadenoma.  Followed  by Endocrinology.    # Future plans for pregnancy:   -The patient saw [facility] 12/24/20 and she knows that she would need to switched from MPA to AZA and wait 3 months prior to pregnancy.    -[facility] requests that she has ureteral stenosis dealt with prior to pregnancy.    -They also request ABO and Ab screen. I will message [facility] regarding lab order              - Will touch base with Dr Britt to decide when she can proceed with pregnancy . Patient was counseled that the transplant ureter can become compressed during pregnancy and that can lead to further worsening of hydronephrosis . She would have increased risk of requiring ureteral stent vs PNT.               - During pregnancy patient would try to stop Florinef ( category C). Would continue Sensipar.  Patient will continue aspirin 81 mg daily.  Patient will have to hold atorvastatin during pregnancy    # Alopecia: Improved with hair tonic which is a herbal product. Patient clarified the hair tonic does not have ashwagandha in it which can interact with tacrolimus  Tacrolimus itself can lead to hair loss    # Medical Compliance: Yes     # COVID-19 Virus Review: Discussed COVID-19 virus and the potential medical risks.  Reviewed preventative health recommendations, which includes washing hands for 20 seconds, avoid touching your face, and social distancing.  Asked patient to inform the transplant center if they are exposed or diagnosed with this virus.    # COVID Vaccine Completed: Yes    # Transplant History:  Etiology of Kidney Failure: Unknown etiology (no kidney biopsy)  Tx: DDKT  Transplant: 8/3/2019 (Kidney)  Donor Type: Donation after Circulatory Death  Donor Class: Standard Criteria Donor  Crossmatch at time of Tx: negative  DSA at time of Tx: No  Significant changes in immunosuppression: None  Significant transplant-related complications: None    Transplant Office Phone Number: [phone]    Assessment and plan was discussed with the patient and she voiced  her understanding and agreement.    Return visit: Return in about 3 months (around 7/20/2022), or with Dr Mcintyre.  Patient seen and discussed with Dr. Sergio Long MD      Attestation:  This patient has been seen and evaluated by me, Nakul Hill MD.  I have reviewed the note and agree with plan of care as documented by the fellow.       Chief Complaint   Ms. Wagner is a 31 year old here for kidney transplant and immunosuppression management.     History of Present Illness      Ms. Wagner has a history of ESRD due to suspected IgA nephropathy and was previously on dialysis.  S/p DDKT in 08/2019 with Dr. Johnson.  She follows with Dr. Hill and Dr. Mcintyre, last visit 08/2021.   Cr has been stable ( baseline creatinine 1.0-1.2 ) with minimal proteinuria.    No history of BK viremia.    Kidney transplant biopsy 09/2019 without evidence of acute rejection.    IS regimen : Tacrolimus and Mycophenolic acid.    Per Dr. Mcintyre, when cleared for pregnancy by urology would decrease MPA to 360 mg BID x 3 days while starting  mg daily, stop MPA after those 3 days, Prednisone 10 mg daily x 5 days.  Recommend waiting at least 3 months after switching to attempt pregnancy.      Access : AVF . 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  She has had stenting at times, removed last 02/2021.  Imaging has not shown any evidence of filling defects or strictures.  She is trying to avoid further stents due to recurrent UTIs.  Allograft US 07/2021 with moderate hydronephrosis unchanged with voiding.  She follows closely with urology, last visit with Dr. Britt 05/2021.  Per their documentation recent renal US showed stable moderate hydronephrosis, stable Creatinine, and no obstruction on Lasix renogram.  Per Dr. Britt, risk for worsening hydronephrosis with extrinsic compression in pregnancy that may require ureteral stent versus percutaneous  nephrostomy.  However if patient understands and accepts the risk it is reasonable to attempt conception.    History of recurrent UTI   Recently had UTI  In July and treated with Augmentin , but later got ciprofloxacin  Follows with ID    H/O  bacterial endocarditis of pulmonary valve successfully treated in 01/2019 with 4 weeks of IV antibiotics.  Echocardiogram 02/2019 that showed resolution of vegetation.  She saw Dr. Toledo of cardiology for evaluation 12/7/21 at which time she reported not being particularly active and some shortness of breath with exertion such as stair climbing.  She also reported some mild chest discomfort in her left upper chest every couple of months lasting a few minutes.  This is not predictably brought on with physical activity.  She denied palpitations, syncope.  Dr. Toledo noted a systolic murmur and recommended a cardiac stress echocardiogram. Echo performed 2/11/22 with EF 60-65%, normal LV wall motion and function, normal RV size and systolic function, no hemodynamically significant valvular abnormalities.  Stress echo completed 1/24/22 with patient achieving 79% of maximum predicted HR, reduced exercise tolerance for age.  This was non diagnostic given less than 85% of maximum heart rate achieved however no changes to suggest ischemia.  ECG 01/2020 with sinus tachycardia, incomplete R BBB, no significant ST-T wave changes.    She was treated for hypertension while on dialysis but following transplant has not needed antihypertensives.  She takes atorvastatin 10 mg / day and plans to stop in pregnancy.   She now has orthostatic hypotension for which she takes Florinef 0.1 mg MWF.    Also has FATOUMATA ,recently initiated CPAP for FATOUMATA.    She follows with Dr. Coles of endocrinology, last visit 11/16/21 , for h/o hypothyroidism, hyperprolactinemia, and possible pituitary adenoma were discussed.  In brief, she initially presented with galactorrhea that improved upon starting thyroid  hormone replacement in 2014.  At that time prolactin levels were elevated but stable.  Per endocrinology, ESKD at that time could explain the elevated prolactin levels relative to low clearance.  MRI w/ contrast in 2017 showed a possible 6.7-5.3 mm microadenoma that was not confirmed in the subsequent MRI.  However, due to her kidney dysfunction, the study had to be done without contrast.  Endocrinology stated that it was unclear if the patient truly had a prolactin producing microadenoma or her hyperprolactinemia was due to ESKD and/or hypothyroidism.  The patient never saw a neurosurgeon or experienced peripheral vision changes.  Most recent prolactin just above normal range.  Per endocrinology, the normalization of her PRL levels with treatment of hypothyroidism and normalization of her kidney function after her transplant suggests that the patient does not have a pituitary adenoma that is secreting prolactin, and that this was rather functional hyperprolactinemia, likely related to hypothyroidism and end-stage kidney disease.  Plan is to continue to monitor annually and consider treatment with cabergoline if indicated in the future.  On levothyroxine daily.  Currently on 25 mcg 4 times per week and 37.5 mcg 3 times per week.  Last TSH 2.49 11/2021.  Plan to increase levothyroxine dose to 1.5 of the 25 mcg tablets daily and repeat labs in 6 to 8 weeks if she becomes pregnant.    H/o upper extremity DVT associated with dialysis catheter site in 2014 for which she was on anticoagulation.  She previously had testing including cardiolipin, lupus anticoagulant, Factor 2 and Factor V Leiden testing that were negative.    H/o two seizures in 2014 associated with SAH while on anticoagulation.  She was on Keppra for years, but was seen by neurology and this was discontinued.  She has had no further seizures or DVT/PE.    History of bicornuate uterus.        Interval history    2 days ago had some discomfort on her LUQ ,  lasted 30 seconds , resolved   Recurred today again  Any nausea or vomiting   Appetite good   No SOB/CP/dizziness/lightheadedness  No focal weakness/altered sensation.  No voiding difficulty/hematuria /flank pain  No rash   Today her weight is 150 lbs   Wt Readings from Last 3 Encounters:   04/20/22 69.9 kg (154 lb)   12/07/21 68.5 kg (151 lb)   11/29/21 67.1 kg (148 lb)     HOME VITALS : 118/73 , HR 80   New family history - father diagnosed with heart disease         Review of Systems   A comprehensive review of systems was obtained and negative, except as noted in the HPI or PMH.    Problem List   Patient Active Problem List   Diagnosis     DVT of upper extremity (deep vein thrombosis) (H)     Seizure disorder (H)     Galactorrhea     Acquired hypothyroidism     Increased prolactin level     Hypomagnesemia     Infective endocarditis-history of.  1/2019.  resolved.      Aftercare following organ transplant     Immunosuppression (H)     Methemoglobinemia     Kidney replaced by transplant     Anxiety     Secondary renal hyperparathyroidism (H)     Vitamin D deficiency     CKD (chronic kidney disease) stage 3, GFR 30-59 ml/min (H)     Stricture or kinking of ureter     Pyelonephritis     Diarrhea     Bicornate uterus     Normal Pap 3/2020.  repeat 3/2023     Prophylactic antibiotic     Hydronephrosis     Hydronephrosis of kidney transplant     Ureter, stricture     Urinary tract infection       Social History   Social History     Tobacco Use     Smoking status: Never Smoker     Smokeless tobacco: Never Used   Substance Use Topics     Alcohol use: No     Alcohol/week: 0.0 standard drinks     Drug use: No       Allergies   Allergies   Allergen Reactions     Contrast Dye Rash     CT contrast allergy 12/14/19 rash over eyes. Need to have pre medication before a CT WITH CONTRAST      Lisinopril Swelling     angioedema     Nitrous Oxide Other (See Comments)     Sense of doom     Chlorhexidine Rash     Rash at site      Sulfa Drugs Rash     Muscle stiffness of neck     Dapsone      Methemoglobinemia     Furosemide Other (See Comments)     Skin flushing     Metrogel [Metronidazole]      Hives diffusely on body after vaginal administration.     Azithromycin Dizziness and Rash     Cefuroxime Rash       Medications   Current Outpatient Medications   Medication Sig     acetaminophen (TYLENOL) 325 MG tablet Take 2 tablets (650 mg) by mouth every 4 hours as needed for mild pain     aspirin (ASA) 81 MG chewable tablet Take 1 tablet (81 mg) by mouth daily     atorvastatin (LIPITOR) 10 MG tablet Take 1 tablet (10 mg) by mouth every morning     diphenhydrAMINE (BENADRYL) 25 MG capsule Take 1 capsule (25 mg) by mouth daily as needed for itching or allergies Take 1-2 tablets by mouth every 6 hours PRN itching/allergies     EPINEPHrine (ANY BX GENERIC EQUIV) 0.3 MG/0.3ML injection 2-pack      famotidine (PEPCID) 20 MG tablet Take 1 tablet (20 mg) by mouth 2 times daily     fludrocortisone (FLORINEF) 0.1 MG tablet Take 1 tablet (0.1 mg) by mouth Every Mon, Wed, Fri Morning     hydrOXYzine (ATARAX) 10 MG tablet Take 1 tablet (10 mg) by mouth 3 times daily as needed for anxiety     lactobacillus rhamnosus, GG, (CULTURELL) capsule Take 1 capsule by mouth 2 times daily      levothyroxine (SYNTHROID/LEVOTHROID) 25 MCG tablet TAKE 1 TABLET(25MCG) BY MOUTH TUES, THUR, SAT AND SUN AND 1 A ND 1/ 2 TABLETS( 37.5 MCG) ON MON WED AND FRI     MYFORTIC (BRAND) 180 MG EC tablet Take 3 tablets (540 mg) by mouth 2 times daily     norethindrone (MICRONOR) 0.35 MG tablet Take one tablet daily     Prenatal Vit-Fe Fumarate-FA (PRENATAL MULTIVITAMIN W/IRON) 27-0.8 MG tablet Take 1 tablet by mouth daily     Prenatal Vit-Fe Fumarate-FA (PRENATAL VITAMINS) 28-0.8 MG TABS Take 1 tablet by mouth daily     senna-docusate (SENOKOT-S/PERICOLACE) 8.6-50 MG tablet Take 1-2 tablets by mouth 2 times daily (Patient taking differently: Take 1-2 tablets by mouth 2 times daily as  needed)     simethicone (MYLICON) 125 MG chewable tablet Take 1 tablet (125 mg) by mouth 4 times daily as needed for intestinal gas     tacrolimus (GENERIC EQUIVALENT) 0.5 MG capsule HOLD     tacrolimus (GENERIC EQUIVALENT) 1 MG capsule Take 3 capsules (3 mg) by mouth 2 times daily     ammonium lactate (AMLACTIN) 12 % external cream Apply twice a day as needed to rough bumpy skin.     cinacalcet (SENSIPAR) 30 MG tablet Take 1 tablet (30 mg) by mouth daily     clindamycin (CLEOCIN) 2 % vaginal cream      No current facility-administered medications for this visit.     Medications Discontinued During This Encounter   Medication Reason     norethindrone (MICRONOR) 0.35 MG tablet         Physical Exam  No vitals for this telemedicine visit    /70   Pulse 95   Temp 97.7  F (36.5  C)   Resp 18   Wt 69.9 kg (154 lb)   SpO2 98%   BMI 29.57 kg/m        GENERAL APPEARANCE: alert and no distress  HENT: no obvious abnormalities on appearance  RESP: breathing appears unremarkable with normal rate, no audible wheezing or cough and no apparent shortness of breath with conversation  MS: extremities normal - no gross deformities noted  SKIN: no apparent rash and normal skin tone  NEURO: speech is clear with no obvious neurological deficits  PSYCH: mentation appears normal and affect normal  ACCESS : Left  Arm AVF  - good bruit       Data     Renal Latest Ref Rng & Units 4/1/2022 1/31/2022 1/11/2022   Na 136 - 145 mmol/L 139 139 139   Na (external) 136 - 145 mmol/L - - -   K 3.5 - 5.0 mmol/L 4.4 4.1 4.4   K (external) 3.5 - 5.0 mmol/L - - -   Cl 98 - 107 mmol/L 110(H) 108(H) 111(H)   CO2 22 - 31 mmol/L 20(L) 23 16(L)   CO2 (external) 22 - 31 mmol/L - - -   BUN 8 - 22 mg/dL 19 26(H) 20   BUN (external) 8 - 22 mg/dL - - -   Cr 0.60 - 1.10 mg/dL 0.79 1.00 0.95   Cr (external) 0.60 - 1 mg/dL - - -   Glucose 70 - 125 mg/dL 94 96 91   Glucose (external) 70 - 125 mg/dL - - -   Ca  8.5 - 10.5 mg/dL 10.3 9.7 9.9   Ca (external)  8.5 - 10.5 mg/dL - - -   Mg 1.6 - 2.3 mg/dL - - -   Mg (external) 1.8 - 2.6 mg/dL - - -     Bone Health Latest Ref Rng & Units 4/1/2022 6/2/2021 3/3/2021   Phos 2.5 - 4.5 mg/dL 2.2(L) - -   Phos (external) 2.5 - 4.5 mg/dL - - -   PTHi 10 - 86 pg/mL 365(H) - 251(H)   PTHi (external) 18 - 80 pg/mL - - -   Vit D Def 20 - 75 ug/L - 15(L) -     Heme Latest Ref Rng & Units 4/1/2022 1/31/2022 1/11/2022   WBC 4.0 - 11.0 10e3/uL 7.2 7.8 8.1   WBC (external) 4.0 - 11.0 thou/uL - - -   Hgb 11.7 - 15.7 g/dL 12.4 12.6 12.5   Hgb (external) 12.0 - 16.0 g/dL - - -   Plt 150 - 450 10e3/uL 206 235 242   Plt (external) 140 - 440 thou/uL - - -   ABSOLUTE NEUTROPHIL 1.6 - 8.3 10e9/L - - -   ABSOLUTE NEUTROPHILS (EXTERNAL) 2.0 - 7.7 thou/uL - - -   ABSOLUTE LYMPHOCYTES 0.8 - 5.3 10e9/L - - -   ABSOLUTE LYMPHOCYTES (EXTERNAL) 0.8 - 4.4 thou/uL - - -   ABSOLUTE MONOCYTES 0.0 - 1.3 10e9/L - - -   ABSOLUTE MONOCYTES (EXTERNAL) 0.0 - 0.9 thou/uL - - -   ABSOLUTE EOSINOPHILS 0.0 - 0.7 10e9/L - - -   ABSOLUTE EOSINOPHILS (EXTERNAL) 0.0 - 0.4 thou/uL - - -   ABSOLUTE BASOPHILS 0.0 - 0.2 10e9/L - - -   ABSOLUTE BASOPHILS (EXTERNAL) 0.0 - 0.2 thou/uL - - -   ABS IMMATURE GRANULOCYTES 0 - 0.4 10e9/L - - -   ABSOLUTE NUCLEATED RBC - - - -     Liver Latest Ref Rng & Units 8/4/2020 2/3/2020 1/23/2020   AP 40 - 150 U/L 185(H) 126 210(H)   AP (external) 34 - 104 U/L - - -   TBili 0.2 - 1.3 mg/dL 0.7 0.6 1.2   DBili 0.0 - 0.2 mg/dL <0.1 0.1 -   ALT 0 - 50 U/L 19 20 20   ALT (external) 7 - 52 U/L - - -   AST 0 - 45 U/L 11 13 9   AST (external) 13 - 39 U/L - - -   Tot Protein 6.8 - 8.8 g/dL 7.9 7.4 8.1   Tot Protein (external) 6.4 - 8.9 g/dL - - -   Albumin 3.4 - 5.0 g/dL 4.0 3.7 4.5   Albumin (external) - - - -     Pancreas Latest Ref Rng & Units 8/4/2020 2/3/2020 8/3/2019   A1C 0 - 5.6 % 5.5 5.7(H) 4.8     Iron studies Latest Ref Rng & Units 8/4/2020 2/17/2020 12/3/2019   Iron 35 - 180 ug/dL 64 95 47   Iron sat 15 - 46 % 32 42 23   Ferritin 12 - 150  ng/mL 1,065(H) 1,139(H) 1,003(H)   Ferritin (external) 10 - 291 ng/mL - - -     [facility] Txp Virology Latest Ref Rng & Units 7/7/2021 6/2/2021 5/3/2021   CVM DNA Quant - - - -   CMV QUANT IU/ML CMVND:CMV DNA Not Detected [IU]/mL - - -   LOG IU/ML OF CMVQNT <2.1 [Log:IU]/mL - - -   BK Spec - Plasma Plasma Plasma   BK Res BKNEG:BK Virus DNA Not Detected copies/mL BK Virus DNA Not Detected BK Virus DNA Not Detected BK Virus DNA Not Detected   BK Log <2.7 Log copies/mL Not Calculated Not Calculated Not Calculated   EBV VCA IGG ANTIBODY U/mL - - -   EBV CAPSID ANTIBODY IGG 0.0 - 0.8 AI - - -   EBV DNA COPIES/ML EBVNEG:EBV DNA Not Detected [Copies]/mL - - -   EBV DNA LOG OF COPIES <2.7 [Log:copies]/mL - - -   Hep B Surf - - - -        Recent Labs   Lab Test 01/11/22  0941 01/31/22  1004 04/01/22  1000   DOSTAC 1/10/2022 1/30/2022 3/31/2022   TACROL 6.4 5.8 5.4     Recent Labs   Lab Test 08/16/19  0807 09/03/19  0944   DOSMPA Not Provided 0840pm   MPACID 1.92 3.39   MPAG 149.2* 52.8

## 2022-04-20 NOTE — LETTER
4/20/2022      RE: Mona Wagner  [facility]  [place] [address]       Mona is a 30 year old who is being evaluated via a billable video visit.      How would you like to obtain your AVS? MyChart  If the video visit is dropped, the invitation should be resent by: Text to cell phone: [phone]  Will anyone else be joining your video visit? No    Video Start Time: 1:56 PM  Video-Visit Details    Type of service:  Video Visit    Video End Time:2:37pm    Originating Location (pt. Location): Home    Distant Location (provider location):  [facility] [place]     Platform used for Video Visit: Eversight    CHRONIC TRANSPLANT NEPHROLOGY VISIT      RECOMMENDATIONS     Start AZATHIOPRINE  150 mg daily   Reduce Mycophenolate from 540 mg 2 times daily to 360 mg  2 times daily for 3 days and then stop .  Start prednisone 10 mg daily for 5 days then stop   Wait for 3 months  from the day you stop mycophenolate , to start planning for pregnancy     For this 3 months , you have to continue birth control measures . After that talk to Maternal and fetal medicine team when it is safe for you to conceive after stopping your  Birth control medications .    Continue to hold  Cinacalcet. 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 Stent replaced most recently 11/23/20.    - Baseline Cr ~ 1.0-1.2, most  recently 0.93   - Proteinuria: Minimal (0.2-0.5 grams)   - Date DSA Last Checked: Aug/2021      Latest DSA: No   - BK Viremia: No Aug 2021   - Kidney Tx Biopsy: Sep 17, 2019; Result: No diagnostic evidence of acute rejection.      # Immunosuppression: Tacrolimus immediate release (goal 4-6) and Mycophenolic acid (dose 540 mg every 12 hours)   - Changes: No. When cleared for pregnancy by urology would decrease MPA to 360mg bid x3 days while starting AZA 150mg daily, stop MPA after those 3 days, giving prednisone 10mg daily x5 days. She would need to wait 6 weeks after switch to start trying to get pregnant    # Infection Prophylaxis:   Last CD4 Level: 201 ( July 2020)   - PJP: None;    # Orthostatic Hypotension: Controlled, but low at times on Florinef 0.1mg MWF;  Goal BP: > 100, but < 130 systolic   - Changes: No. Class C during pregnancy so would try to hold if she could tolerate     # Anemia in Chronic Renal Disease: Hgb: Stable, now normal      CHARLINE: No   - Iron studies: Replete ( 8/2020)     # Mineral Bone Disorder:   - Secondary renal hyperparathyroidism; PTH level: Mildly elevated (151-300 pg/ml)        On treatment: Cinacalcet.   - Vitamin D; level: Low        On Supplement: No; Not on treatment due to hypercalcemia  - Calcium; level: Normal        On Supplement: No    # Electrolytes:   - Potassium; level: Normal        On supplement: No  - Magnesium; level: Normal        On supplement: Yes  - Bicarbonate; level: Normal        On supplement: Yes, stop bicarb    # Recurrent UTI:   Recently had UTI  In July and treated with Augmentin , but later got ciprofloxacin  Follows with ID  Rpt UA 8/4 wnl    # Ureteral stenosis:   -Stent  Removed last Feb 2021 . Follows with urology . Trying to avoid further stents due to recurrent UTIs              -  Allograft US July 2021: moderate hydronephrosis unchanged with voiding.        # Hyperprolactinemia: Normal prolactin level with last check.  Felt secondary to  hypothyroidism versus kidney failure, or less likely now a pituitary microadenoma.  Followed by Endocrinology.    # Future plans for pregnancy:   -The patient saw [facility] 12/24/20 and she knows that she would need to switched from MPA to AZA and wait 3 months prior to pregnancy.    -[facility] requests that she has ureteral stenosis dealt with prior to pregnancy.    -They also request ABO and Ab screen. I will message [facility] regarding lab order              - Will touch base with Dr Britt to decide when she can proceed with pregnancy . Patient was counseled that the transplant ureter can become compressed during pregnancy and that can lead to further worsening of hydronephrosis . She would have increased risk of requiring ureteral stent vs PNT.               - During pregnancy patient would try to stop Florinef ( category C). Would continue Sensipar.  Patient will continue aspirin 81 mg daily.  Patient will have to hold atorvastatin during pregnancy    # Alopecia: Improved with hair tonic which is a herbal product. Patient clarified the hair tonic does not have ashwagandha in it which can interact with tacrolimus  Tacrolimus itself can lead to hair loss    # Medical Compliance: Yes     # COVID-19 Virus Review: Discussed COVID-19 virus and the potential medical risks.  Reviewed preventative health recommendations, which includes washing hands for 20 seconds, avoid touching your face, and social distancing.  Asked patient to inform the transplant center if they are exposed or diagnosed with this virus.    # COVID Vaccine Completed: Yes    # Transplant History:  Etiology of Kidney Failure: Unknown etiology (no kidney biopsy)  Tx: DDKT  Transplant: 8/3/2019 (Kidney)  Donor Type: Donation after Circulatory Death  Donor Class: Standard Criteria Donor  Crossmatch at time of Tx: negative  DSA at time of Tx: No  Significant changes in immunosuppression: None  Significant transplant-related complications: None    Transplant Office Phone  Number: [phone]    Assessment and plan was discussed with the patient and she voiced her understanding and agreement.    Return visit: Return in about 3 months (around 7/20/2022), or with Dr Mcintyre.  Patient seen and discussed with Dr. Sergio Long MD      Attestation:  This patient has been seen and evaluated by me, Nakul Hill MD.  I have reviewed the note and agree with plan of care as documented by the fellow.       Chief Complaint   Ms. Wagner is a 31 year old here for kidney transplant and immunosuppression management.     History of Present Illness      Ms. Wagner has a history of ESRD due to suspected IgA nephropathy and was previously on dialysis.  S/p DDKT in 08/2019 with Dr. Johnson.  She follows with Dr. Hill and Dr. Mcintyre, last visit 08/2021.   Cr has been stable ( baseline creatinine 1.0-1.2 ) with minimal proteinuria.    No history of BK viremia.    Kidney transplant biopsy 09/2019 without evidence of acute rejection.    IS regimen : Tacrolimus and Mycophenolic acid.    Per Dr. Mcintyre, when cleared for pregnancy by urology would decrease MPA to 360 mg BID x 3 days while starting  mg daily, stop MPA after those 3 days, Prednisone 10 mg daily x 5 days.  Recommend waiting at least 3 months after switching to attempt pregnancy.      Access : AVF . 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  She has had stenting at times, removed last 02/2021.  Imaging has not shown any evidence of filling defects or strictures.  She is trying to avoid further stents due to recurrent UTIs.  Allograft US 07/2021 with moderate hydronephrosis unchanged with voiding.  She follows closely with urology, last visit with Dr. Britt 05/2021.  Per their documentation recent renal US showed stable moderate hydronephrosis, stable Creatinine, and no obstruction on Lasix renogram.  Per Dr. Britt, risk for worsening hydronephrosis with  extrinsic compression in pregnancy that may require ureteral stent versus percutaneous nephrostomy.  However if patient understands and accepts the risk it is reasonable to attempt conception.    History of recurrent UTI   Recently had UTI  In July and treated with Augmentin , but later got ciprofloxacin  Follows with ID    H/O  bacterial endocarditis of pulmonary valve successfully treated in 01/2019 with 4 weeks of IV antibiotics.  Echocardiogram 02/2019 that showed resolution of vegetation.  She saw Dr. Toledo of cardiology for evaluation 12/7/21 at which time she reported not being particularly active and some shortness of breath with exertion such as stair climbing.  She also reported some mild chest discomfort in her left upper chest every couple of months lasting a few minutes.  This is not predictably brought on with physical activity.  She denied palpitations, syncope.  Dr. Toledo noted a systolic murmur and recommended a cardiac stress echocardiogram. Echo performed 2/11/22 with EF 60-65%, normal LV wall motion and function, normal RV size and systolic function, no hemodynamically significant valvular abnormalities.  Stress echo completed 1/24/22 with patient achieving 79% of maximum predicted HR, reduced exercise tolerance for age.  This was non diagnostic given less than 85% of maximum heart rate achieved however no changes to suggest ischemia.  ECG 01/2020 with sinus tachycardia, incomplete R BBB, no significant ST-T wave changes.    She was treated for hypertension while on dialysis but following transplant has not needed antihypertensives.  She takes atorvastatin 10 mg / day and plans to stop in pregnancy.   She now has orthostatic hypotension for which she takes Florinef 0.1 mg MWF.    Also has FATOUMATA ,recently initiated CPAP for FATOUMATA.    She follows with Dr. Coles of endocrinology, last visit 11/16/21 , for h/o hypothyroidism, hyperprolactinemia, and possible pituitary adenoma were discussed.  In  brief, she initially presented with galactorrhea that improved upon starting thyroid hormone replacement in 2014.  At that time prolactin levels were elevated but stable.  Per endocrinology, ESKD at that time could explain the elevated prolactin levels relative to low clearance.  MRI w/ contrast in 2017 showed a possible 6.7-5.3 mm microadenoma that was not confirmed in the subsequent MRI.  However, due to her kidney dysfunction, the study had to be done without contrast.  Endocrinology stated that it was unclear if the patient truly had a prolactin producing microadenoma or her hyperprolactinemia was due to ESKD and/or hypothyroidism.  The patient never saw a neurosurgeon or experienced peripheral vision changes.  Most recent prolactin just above normal range.  Per endocrinology, the normalization of her PRL levels with treatment of hypothyroidism and normalization of her kidney function after her transplant suggests that the patient does not have a pituitary adenoma that is secreting prolactin, and that this was rather functional hyperprolactinemia, likely related to hypothyroidism and end-stage kidney disease.  Plan is to continue to monitor annually and consider treatment with cabergoline if indicated in the future.  On levothyroxine daily.  Currently on 25 mcg 4 times per week and 37.5 mcg 3 times per week.  Last TSH 2.49 11/2021.  Plan to increase levothyroxine dose to 1.5 of the 25 mcg tablets daily and repeat labs in 6 to 8 weeks if she becomes pregnant.    H/o upper extremity DVT associated with dialysis catheter site in 2014 for which she was on anticoagulation.  She previously had testing including cardiolipin, lupus anticoagulant, Factor 2 and Factor V Leiden testing that were negative.    H/o two seizures in 2014 associated with SAH while on anticoagulation.  She was on Keppra for years, but was seen by neurology and this was discontinued.  She has had no further seizures or DVT/PE.    History of  bicornuate uterus.        Interval history    2 days ago had some discomfort on her LUQ , lasted 30 seconds , resolved   Recurred today again  Any nausea or vomiting   Appetite good   No SOB/CP/dizziness/lightheadedness  No focal weakness/altered sensation.  No voiding difficulty/hematuria /flank pain  No rash   Today her weight is 150 lbs   Wt Readings from Last 3 Encounters:   04/20/22 69.9 kg (154 lb)   12/07/21 68.5 kg (151 lb)   11/29/21 67.1 kg (148 lb)     HOME VITALS : 118/73 , HR 80   New family history - father diagnosed with heart disease         Review of Systems   A comprehensive review of systems was obtained and negative, except as noted in the HPI or PMH.    Problem List   Patient Active Problem List   Diagnosis     DVT of upper extremity (deep vein thrombosis) (H)     Seizure disorder (H)     Galactorrhea     Acquired hypothyroidism     Increased prolactin level     Hypomagnesemia     Infective endocarditis-history of.  1/2019.  resolved.      Aftercare following organ transplant     Immunosuppression (H)     Methemoglobinemia     Kidney replaced by transplant     Anxiety     Secondary renal hyperparathyroidism (H)     Vitamin D deficiency     CKD (chronic kidney disease) stage 3, GFR 30-59 ml/min (H)     Stricture or kinking of ureter     Pyelonephritis     Diarrhea     Bicornate uterus     Normal Pap 3/2020.  repeat 3/2023     Prophylactic antibiotic     Hydronephrosis     Hydronephrosis of kidney transplant     Ureter, stricture     Urinary tract infection       Social History   Social History     Tobacco Use     Smoking status: Never Smoker     Smokeless tobacco: Never Used   Substance Use Topics     Alcohol use: No     Alcohol/week: 0.0 standard drinks     Drug use: No       Allergies   Allergies   Allergen Reactions     Contrast Dye Rash     CT contrast allergy 12/14/19 rash over eyes. Need to have pre medication before a CT WITH CONTRAST      Lisinopril Swelling     angioedema     Nitrous  Oxide Other (See Comments)     Sense of doom     Chlorhexidine Rash     Rash at site     Sulfa Drugs Rash     Muscle stiffness of neck     Dapsone      Methemoglobinemia     Furosemide Other (See Comments)     Skin flushing     Metrogel [Metronidazole]      Hives diffusely on body after vaginal administration.     Azithromycin Dizziness and Rash     Cefuroxime Rash       Medications   Current Outpatient Medications   Medication Sig     acetaminophen (TYLENOL) 325 MG tablet Take 2 tablets (650 mg) by mouth every 4 hours as needed for mild pain     aspirin (ASA) 81 MG chewable tablet Take 1 tablet (81 mg) by mouth daily     atorvastatin (LIPITOR) 10 MG tablet Take 1 tablet (10 mg) by mouth every morning     diphenhydrAMINE (BENADRYL) 25 MG capsule Take 1 capsule (25 mg) by mouth daily as needed for itching or allergies Take 1-2 tablets by mouth every 6 hours PRN itching/allergies     EPINEPHrine (ANY BX GENERIC EQUIV) 0.3 MG/0.3ML injection 2-pack      famotidine (PEPCID) 20 MG tablet Take 1 tablet (20 mg) by mouth 2 times daily     fludrocortisone (FLORINEF) 0.1 MG tablet Take 1 tablet (0.1 mg) by mouth Every Mon, Wed, Fri Morning     hydrOXYzine (ATARAX) 10 MG tablet Take 1 tablet (10 mg) by mouth 3 times daily as needed for anxiety     lactobacillus rhamnosus, GG, (CULTURELL) capsule Take 1 capsule by mouth 2 times daily      levothyroxine (SYNTHROID/LEVOTHROID) 25 MCG tablet TAKE 1 TABLET(25MCG) BY MOUTH TUES, THUR, SAT AND SUN AND 1 A ND 1/ 2 TABLETS( 37.5 MCG) ON MON WED AND FRI     MYFORTIC (BRAND) 180 MG EC tablet Take 3 tablets (540 mg) by mouth 2 times daily     norethindrone (MICRONOR) 0.35 MG tablet Take one tablet daily     Prenatal Vit-Fe Fumarate-FA (PRENATAL MULTIVITAMIN W/IRON) 27-0.8 MG tablet Take 1 tablet by mouth daily     Prenatal Vit-Fe Fumarate-FA (PRENATAL VITAMINS) 28-0.8 MG TABS Take 1 tablet by mouth daily     senna-docusate (SENOKOT-S/PERICOLACE) 8.6-50 MG tablet Take 1-2 tablets by mouth  2 times daily (Patient taking differently: Take 1-2 tablets by mouth 2 times daily as needed)     simethicone (MYLICON) 125 MG chewable tablet Take 1 tablet (125 mg) by mouth 4 times daily as needed for intestinal gas     tacrolimus (GENERIC EQUIVALENT) 0.5 MG capsule HOLD     tacrolimus (GENERIC EQUIVALENT) 1 MG capsule Take 3 capsules (3 mg) by mouth 2 times daily     ammonium lactate (AMLACTIN) 12 % external cream Apply twice a day as needed to rough bumpy skin.     cinacalcet (SENSIPAR) 30 MG tablet Take 1 tablet (30 mg) by mouth daily     clindamycin (CLEOCIN) 2 % vaginal cream      No current facility-administered medications for this visit.     Medications Discontinued During This Encounter   Medication Reason     norethindrone (MICRONOR) 0.35 MG tablet         Physical Exam  No vitals for this telemedicine visit    /70   Pulse 95   Temp 97.7  F (36.5  C)   Resp 18   Wt 69.9 kg (154 lb)   SpO2 98%   BMI 29.57 kg/m        GENERAL APPEARANCE: alert and no distress  HENT: no obvious abnormalities on appearance  RESP: breathing appears unremarkable with normal rate, no audible wheezing or cough and no apparent shortness of breath with conversation  MS: extremities normal - no gross deformities noted  SKIN: no apparent rash and normal skin tone  NEURO: speech is clear with no obvious neurological deficits  PSYCH: mentation appears normal and affect normal  ACCESS : Left  Arm AVF  - good bruit       Data     Renal Latest Ref Rng & Units 4/1/2022 1/31/2022 1/11/2022   Na 136 - 145 mmol/L 139 139 139   Na (external) 136 - 145 mmol/L - - -   K 3.5 - 5.0 mmol/L 4.4 4.1 4.4   K (external) 3.5 - 5.0 mmol/L - - -   Cl 98 - 107 mmol/L 110(H) 108(H) 111(H)   CO2 22 - 31 mmol/L 20(L) 23 16(L)   CO2 (external) 22 - 31 mmol/L - - -   BUN 8 - 22 mg/dL 19 26(H) 20   BUN (external) 8 - 22 mg/dL - - -   Cr 0.60 - 1.10 mg/dL 0.79 1.00 0.95   Cr (external) 0.60 - 1 mg/dL - - -   Glucose 70 - 125 mg/dL 94 96 91   Glucose  (external) 70 - 125 mg/dL - - -   Ca  8.5 - 10.5 mg/dL 10.3 9.7 9.9   Ca (external) 8.5 - 10.5 mg/dL - - -   Mg 1.6 - 2.3 mg/dL - - -   Mg (external) 1.8 - 2.6 mg/dL - - -     Bone Health Latest Ref Rng & Units 4/1/2022 6/2/2021 3/3/2021   Phos 2.5 - 4.5 mg/dL 2.2(L) - -   Phos (external) 2.5 - 4.5 mg/dL - - -   PTHi 10 - 86 pg/mL 365(H) - 251(H)   PTHi (external) 18 - 80 pg/mL - - -   Vit D Def 20 - 75 ug/L - 15(L) -     Heme Latest Ref Rng & Units 4/1/2022 1/31/2022 1/11/2022   WBC 4.0 - 11.0 10e3/uL 7.2 7.8 8.1   WBC (external) 4.0 - 11.0 thou/uL - - -   Hgb 11.7 - 15.7 g/dL 12.4 12.6 12.5   Hgb (external) 12.0 - 16.0 g/dL - - -   Plt 150 - 450 10e3/uL 206 235 242   Plt (external) 140 - 440 thou/uL - - -   ABSOLUTE NEUTROPHIL 1.6 - 8.3 10e9/L - - -   ABSOLUTE NEUTROPHILS (EXTERNAL) 2.0 - 7.7 thou/uL - - -   ABSOLUTE LYMPHOCYTES 0.8 - 5.3 10e9/L - - -   ABSOLUTE LYMPHOCYTES (EXTERNAL) 0.8 - 4.4 thou/uL - - -   ABSOLUTE MONOCYTES 0.0 - 1.3 10e9/L - - -   ABSOLUTE MONOCYTES (EXTERNAL) 0.0 - 0.9 thou/uL - - -   ABSOLUTE EOSINOPHILS 0.0 - 0.7 10e9/L - - -   ABSOLUTE EOSINOPHILS (EXTERNAL) 0.0 - 0.4 thou/uL - - -   ABSOLUTE BASOPHILS 0.0 - 0.2 10e9/L - - -   ABSOLUTE BASOPHILS (EXTERNAL) 0.0 - 0.2 thou/uL - - -   ABS IMMATURE GRANULOCYTES 0 - 0.4 10e9/L - - -   ABSOLUTE NUCLEATED RBC - - - -     Liver Latest Ref Rng & Units 8/4/2020 2/3/2020 1/23/2020   AP 40 - 150 U/L 185(H) 126 210(H)   AP (external) 34 - 104 U/L - - -   TBili 0.2 - 1.3 mg/dL 0.7 0.6 1.2   DBili 0.0 - 0.2 mg/dL <0.1 0.1 -   ALT 0 - 50 U/L 19 20 20   ALT (external) 7 - 52 U/L - - -   AST 0 - 45 U/L 11 13 9   AST (external) 13 - 39 U/L - - -   Tot Protein 6.8 - 8.8 g/dL 7.9 7.4 8.1   Tot Protein (external) 6.4 - 8.9 g/dL - - -   Albumin 3.4 - 5.0 g/dL 4.0 3.7 4.5   Albumin (external) - - - -     Pancreas Latest Ref Rng & Units 8/4/2020 2/3/2020 8/3/2019   A1C 0 - 5.6 % 5.5 5.7(H) 4.8     Iron studies Latest Ref Rng & Units 8/4/2020 2/17/2020 12/3/2019    Iron 35 - 180 ug/dL 64 95 47   Iron sat 15 - 46 % 32 42 23   Ferritin 12 - 150 ng/mL 1,065(H) 1,139(H) 1,003(H)   Ferritin (external) 10 - 291 ng/mL - - -     [facility] Txp Virology Latest Ref Rng & Units 7/7/2021 6/2/2021 5/3/2021   CVM DNA Quant - - - -   CMV QUANT IU/ML CMVND:CMV DNA Not Detected [IU]/mL - - -   LOG IU/ML OF CMVQNT <2.1 [Log:IU]/mL - - -   BK Spec - Plasma Plasma Plasma   BK Res BKNEG:BK Virus DNA Not Detected copies/mL BK Virus DNA Not Detected BK Virus DNA Not Detected BK Virus DNA Not Detected   BK Log <2.7 Log copies/mL Not Calculated Not Calculated Not Calculated   EBV VCA IGG ANTIBODY U/mL - - -   EBV CAPSID ANTIBODY IGG 0.0 - 0.8 AI - - -   EBV DNA COPIES/ML EBVNEG:EBV DNA Not Detected [Copies]/mL - - -   EBV DNA LOG OF COPIES <2.7 [Log:copies]/mL - - -   Hep B Surf - - - -        Recent Labs   Lab Test 01/11/22  0941 01/31/22  1004 04/01/22  1000   DOSTAC 1/10/2022 1/30/2022 3/31/2022   TACROL 6.4 5.8 5.4     Recent Labs   Lab Test 08/16/19  0807 09/03/19  0944   DOSMPA Not Provided 0840pm   MPACID 1.92 3.39   MPAG 149.2* 52.8       Nakul Hill MD

## 2022-04-22 ENCOUNTER — IMMUNIZATION (OUTPATIENT)
Dept: NURSING | Facility: CLINIC | Age: 32
End: 2022-04-22
Payer: MEDICARE

## 2022-04-22 PROCEDURE — 0064A COVID-19,PF,MODERNA (18+ YRS BOOSTER .25ML): CPT

## 2022-04-22 PROCEDURE — 91306 COVID-19,PF,MODERNA (18+ YRS BOOSTER .25ML): CPT

## 2022-05-04 ENCOUNTER — TELEPHONE (OUTPATIENT)
Dept: FAMILY MEDICINE | Facility: CLINIC | Age: 32
End: 2022-05-04

## 2022-05-04 ENCOUNTER — TELEPHONE (OUTPATIENT)
Dept: TRANSPLANT | Facility: CLINIC | Age: 32
End: 2022-05-04

## 2022-05-04 NOTE — TELEPHONE ENCOUNTER
Called patient to discuss. Mona is eligible for COVID treatment due to high risk related to transplant and symptoms onset two days ago. Mona declines treatment today, she is waiting to hear about treatment from her transplant team, and stated she will call tomorrow if she doesn't hear from them. Mona had other questions regarding COVID including: if she and her  can be around each other, will her  get COVID again since the end of his illness will be before her illness, is the oral treatment safe, how long until she is not eligible for treatment Writer answered all questions along with providing reassurance. Recommended she call tomorrow if she hasn't heard from transplant team. Also requested she call if her symptoms worsen. Mona and her  had no further questions or concerns at this time. Danny WADSWORTH

## 2022-05-04 NOTE — TELEPHONE ENCOUNTER
Patient Call: General  Route to LPN    Reason for call:pt's  tested positive or COVID 19 on 4/29  Yesterday she had a sore throat and today sore throat is  worse and is COVID positive     Call back needed? Yes    Return Call Needed  Same as documented in contacts section  When to return call?: Same day: Route High Priority

## 2022-05-04 NOTE — TELEPHONE ENCOUNTER
[facility] phone call message- general phone call:    Reason for call: Patient called stated that she is now Covid Positive and would like to know what she needs to do and if she needs to distance herself from her  as well. Her  is also positive which was how she got exposed. Please call and advise. I did schedule the patient with Dr. Bowen for a video visit on 05/09/2022.    Return call needed: Yes    OK to leave a message on voice mail? Yes    Primary language: English      needed? No    Call taken on May 4, 2022 at 2:22 PM by Cr Stokes

## 2022-05-05 ENCOUNTER — DOCUMENTATION ONLY (OUTPATIENT)
Dept: TRANSPLANT | Facility: CLINIC | Age: 32
End: 2022-05-05
Payer: MEDICARE

## 2022-05-05 ENCOUNTER — TELEPHONE (OUTPATIENT)
Dept: TRANSPLANT | Facility: CLINIC | Age: 32
End: 2022-05-05
Payer: MEDICARE

## 2022-05-05 ENCOUNTER — VIRTUAL VISIT (OUTPATIENT)
Dept: FAMILY MEDICINE | Facility: CLINIC | Age: 32
End: 2022-05-05
Payer: MEDICARE

## 2022-05-05 VITALS
WEIGHT: 147 LBS | BODY MASS INDEX: 28.86 KG/M2 | DIASTOLIC BLOOD PRESSURE: 54 MMHG | HEIGHT: 60 IN | SYSTOLIC BLOOD PRESSURE: 91 MMHG

## 2022-05-05 DIAGNOSIS — U07.1 RESPIRATORY TRACT INFECTION DUE TO COVID-19 VIRUS: Primary | ICD-10-CM

## 2022-05-05 DIAGNOSIS — J98.8 RESPIRATORY TRACT INFECTION DUE TO COVID-19 VIRUS: Primary | ICD-10-CM

## 2022-05-05 DIAGNOSIS — Z91.89 AT INCREASED RISK FOR EMERGENCY HOSPITAL ADMISSION: ICD-10-CM

## 2022-05-05 PROCEDURE — 99443 PR PHYSICIAN TELEPHONE EVALUATION 21-30 MIN: CPT | Mod: 95 | Performed by: STUDENT IN AN ORGANIZED HEALTH CARE EDUCATION/TRAINING PROGRAM

## 2022-05-05 NOTE — PATIENT INSTRUCTIONS
Mona -  Start taking the paxlovid tonight - it's actually 3 pills at a time twice per day (6 pills per day)    STOP taking your atorvastatin while you are taking it    It looks like paxlovid can decrease the effectiveness of your birth control, so you may want to use a back up method (condoms) if you are trying to avoid pregnancy    Reach out to your transplant team to make sure they don't want to adjust your Tacrolimus dose    ------------    Adverse Reactions   The following adverse reactions and incidences are derived from the FDA issued emergency use authorization (EUA) unless otherwise specified. Refer to EUA for information regarding reporting adverse reactions (FDA 2021). Adverse reactions reported in adults. Also see ritonavir.  1% to 10%:  Cardiovascular: Hypertension (1%)  Gastrointestinal: Diarrhea (3%), dysgeusia - changes in taste (6%)  Neuromuscular & skeletal: Myalgia (1%)  Postmarketing: Hypersensitivity: Angioedema, hypersensitivity reaction

## 2022-05-05 NOTE — TELEPHONE ENCOUNTER
Patient was prescribed Paxlovid by her PCP for Covid and was calling to see if she could start taking the Paxlovid and if she needed to adjust her Tacrolimus dose. SHERLEY Mathis [ Msg Id [id number] ] the renal fellow on call who talked to Dr. Wilson who doesn't want her to take Paxlovid at all.  is going to call Mona and let her know her options and come up with a plan to treat her Covid. Mona will call her coordinator in the morning to get monoclonol antibiody treatment scheduled.

## 2022-05-05 NOTE — TELEPHONE ENCOUNTER
Family Medicine COVID treatment RN note  05/05/22    Ask patient the following questions  What was the date of your positive COVID test?  05/04/2022  Where were you tested?  home    Do you have any of the following conditions that place you at risk of being very sick from COVID-19? member of a BIPOC community and solid organ or blood cell transplant    Do you currently have symptoms?  Yes  Current symptoms: Sore throat, slight cough-Considers symptoms mild  When did your symptoms begin? 05/02/2022  Was the onset of symptoms within the last 5 days? Yes      Eligibility for treatment   Type of treatment Eligibility criteria   Oral antivirals Currently symptomatic, symptoms began within last 5 days, and part of a high risk condition population   Monoclonal antibodies Currently symptomatic, symptoms began within last 7 days, and part of a high risk condition population     Is patient eligible for treatment? Yes, patient has a high risk condition and symptoms within last 5 days.  READ TO PATIENT: There are now oral medications available for the treatment of COVID-19.  Taking one of these medications within the first five days of symptoms (when people may not yet feel severely ill) has been shown to make people feel better, prevent them from getting sicker, and preventing hospitalization and death.  May I assist you in scheduling a virtual (video or telephone) visit with a provider to discuss treatment options?  Yes, scheduled virtual visit for patient.      Reviewed following information with patient  How can I protect others?    These guidelines are for isolating before returning to work, school or .    If you DO have symptoms  Stay home and away from others   For at least 5 days after your symptoms started, AND  You are fever free for 24 hours (with no medicine that reduces fever), AND  Your other symptoms are better  Wear a mask for 10 full days anytime you are around others    If you DON'T have symptoms  Stay  home and away from others for at least 5 days after your positive test  Wear a mask for 10 full days anytime you are around others    There may be different guidelines for healthcare facilities.  Please check with the specific sites before arriving.    If you have been told by a doctor that you were severely ill with COVID-19 or are immunocompromised, you should isolate for at least 10 days.      Tonja Deng RN

## 2022-05-05 NOTE — TELEPHONE ENCOUNTER
Spoke with patient, attempted to set up monoclonal but the selected areas were too far for patient, she declined me finishing to form. Since she has mild symptoms, she just wants to manage at home.

## 2022-05-05 NOTE — TELEPHONE ENCOUNTER
Pt call and advised have question about treatment. Per Pt is unsure if needing treatment or infusion for treatment. Pt will be waiting for a call back. Please call and advise, thank you.

## 2022-05-05 NOTE — TELEPHONE ENCOUNTER
General  Route to LPN    Reason for call: Patient would like to speak to coordinator regarding Monoclonal Antibody Treatment    Call back needed? Yes  Return Call Needed  Same as documented in contacts section

## 2022-05-05 NOTE — TELEPHONE ENCOUNTER
Spoke with patient regarding monoclonal antibody treatment. Patient currently has very mild symptoms and is unsure if she wants to proceed with treatment. Will fill out monoclonal antibody form and let her decide when they call to set up treatment.

## 2022-05-05 NOTE — PROGRESS NOTES
Mona is a 31 year old who is being evaluated via a billable telephone visit.      How would you like to obtain your AVS? MyChart    Assessment & Plan     Respiratory tract infection due to COVID-19 virus  At increased risk for emergency hospital admission  Referred here by transplant team to discuss oral antiviral options given monoclonal antibody infusion center is too far away.  She had a kidney transplant back in 2019 for IgA nephropathy after being on dialysis for 6 years, making her high risk for COVID complications. First symptoms 5/2, so still in the window to start paxlovid.  She is currently taking azathioprine, mycophenolic acid, tacrolimus, Florinef for stress dose steroids while ill.  Has prednisone on her list, but states she has not started it yet, and is set to start in a week or 2.  Reviewed all drug-drug interactions with Paxlovid.  The only transplant medicine that may interact is tacrolimus.  Her other nontransplant meds that have interactions are atorvastatin and her norethindrone daily birth control pill.  -Start nirmatrelvir and ritonavir (PAXLOVID) therapy pack  Dispense: 30 each; Refill: 0  -Advised her to reach out to her transplant team to see if they would like to adjust the dose of her tacrolimus while she is on Paxlovid  -Instructed her to stop taking her atorvastatin now before she starts Paxlovid  -Warned her that her norethindrone may be rendered less effective while on Paxlovid, so she should use a backup form of contraception if wanting to avoid pregnancy          Viet Wilkes MD  [facility]    Subjective   oMna is a 31 year old who presents for the following health issues     HPI   Referred here to discuss oral antiviral options since monoclonal antibody infusion center is too far away.  First positive 5/4 AM  First symptoms 5/2  Mostly just sore throat at this time    COVID-19 Symptom Review      Are any of the following symptoms significant  for you?    New or worsening difficulty breathing? No    Worsening cough? I am coughing up mucus, not worsening    Fever or chills? No    Headache: no    Sore throat: YES    Chest pain: YES- - when coughing    Diarrhea: YES    Body aches? no    What treatments has patient tried? Cough syrup   Does patient live in a nursing home, group home, or shelter? no  Does patient have a way to get food/medications during quarantined? Yes, I have a friend or family member who can help me.    Kidney transplant August 2019 d/t IgA nephropathy  Transplant coordinator in touch with Mona villarreal and today    4:59 PM - Visit end time                Review of Systems   Constitutional, HEENT, cardiovascular, pulmonary, gi and gu systems are negative, except as otherwise noted.      Objective           Vitals:  No vitals were obtained today due to virtual visit.    Physical Exam   healthy, alert and no distress  PSYCH: Alert and oriented times 3; coherent speech, normal   rate and volume, able to articulate logical thoughts, able   to abstract reason, no tangential thoughts, no hallucinations   or delusions  Her affect is normal  RESP: No cough, no audible wheezing, able to talk in full sentences  Remainder of exam unable to be completed due to telephone visits            Phone call duration: 25 minutes

## 2022-05-06 NOTE — PROGRESS NOTES
I have personally reviewed the telephone encounter as documented by Dr. Wilkes.  I agree with the assessment and plan as documented for 31 yr old female with COVID evaluated for oral medication therapy. Lengthy visit reviewing potential side effects and medication interactions w/ pt. We sent in a script for Paxlovid and recommended she discuss further with her transplant team as she would have to hold some of her immunosuppressive meds while on it. Precautions given. Anticipatory guidance given.     Brian Kohli MD  May 6, 2022  1:54 PM

## 2022-05-09 ENCOUNTER — VIRTUAL VISIT (OUTPATIENT)
Dept: FAMILY MEDICINE | Facility: CLINIC | Age: 32
End: 2022-05-09
Payer: MEDICARE

## 2022-05-09 DIAGNOSIS — R09.81 NASAL CONGESTION: Primary | ICD-10-CM

## 2022-05-09 DIAGNOSIS — U07.1 INFECTION DUE TO 2019 NOVEL CORONAVIRUS: ICD-10-CM

## 2022-05-09 PROCEDURE — 99213 OFFICE O/P EST LOW 20 MIN: CPT | Mod: 95 | Performed by: STUDENT IN AN ORGANIZED HEALTH CARE EDUCATION/TRAINING PROGRAM

## 2022-05-09 RX ORDER — GUAIFENESIN 600 MG/1
1200 TABLET, EXTENDED RELEASE ORAL 2 TIMES DAILY
Qty: 45 TABLET | Refills: 0 | Status: SHIPPED | OUTPATIENT
Start: 2022-05-09 | End: 2022-06-24

## 2022-05-09 RX ORDER — ECHINACEA PURPUREA EXTRACT 125 MG
TABLET ORAL
Qty: 30 ML | Refills: 0 | Status: SHIPPED | OUTPATIENT
Start: 2022-05-09 | End: 2023-01-16

## 2022-05-09 NOTE — PROGRESS NOTES
Mona is a 31 year old who is being evaluated via a billable video visit.    Video Start Time: 3:10pm    Assessment & Plan     Nasal congestion  - sodium chloride (OCEAN) 0.65 % nasal spray; Spray one spray in each nostril three times daily for congestion  - guaiFENesin (MUCINEX) 600 MG 12 hr tablet; Take 2 tablets (1,200 mg) by mouth 2 times daily    Infection due to 2019 novel coronavirus  - patient with very mild symptoms   - clinically doing well  - missed window for monoclonal antibodies because was unable to get to infusion center  - has been advised against taking paxlovid by transplant team due to potential effect on tacrolimus level  - no other alternatives appropriate at this time.     Continue to manage symptomatically.  Seek care if sx worsen                 Macarena Bowen MD  [facility]    Subjective   Mona is a 31 year old who presents for the following health issues      HPI       COVID-19 Symptom Review- 3:10-3:26pm  How many days ago did these symptoms start? 5/4/2022.  SYmptoms started 5/2/2022.  Partner tested positive 4/29    Are any of the following symptoms significant for you?    New or worsening difficulty breathing? No    Worsening cough? Yes, it's a dry cough.  Light dry cough started yesterday.  No SOB associated with cough    Fever or chills? No    Headache: yes, now resolved    Sore throat: Yes    Chest pain: no    Diarrhea: stool somewhat looser but once per day.      Body aches? no    What treatments has patient tried? Robitussin 10ml every 4 hours since 4/29/2022   Does patient live in a nursing home, group home, or shelter? no  Does patient have a way to get food/medications during quarantined? Yes, I have a friend or family member who can help me.          ROS as above      Objective           Vitals:  No vitals were obtained today due to virtual visit.    Physical Exam   GENERAL: Healthy, alert and no distress  EYES: Eyes grossly normal to  inspection.  No discharge or erythema, or obvious scleral/conjunctival abnormalities.  RESP: No audible wheeze, cough, or visible cyanosis.  No visible retractions or increased work of breathing.    SKIN: Visible skin clear. No significant rash, abnormal pigmentation or lesions.  NEURO: Cranial nerves grossly intact.  Mentation and speech appropriate for age.  PSYCH: Mentation appears normal, affect normal/bright, judgement and insight intact, normal speech and appearance well-groomed.    Positive home test for COVID      Video-Visit Details    Type of service:  Video Visit    Video End Time:3:26    Originating Location (pt. Location): Home    Distant Location (provider location):  [facility]     Platform used for Video Visit: Evver

## 2022-05-31 ENCOUNTER — DOCUMENTATION ONLY (OUTPATIENT)
Dept: SLEEP MEDICINE | Facility: CLINIC | Age: 32
End: 2022-05-31
Payer: MEDICARE

## 2022-05-31 DIAGNOSIS — E03.9 ACQUIRED HYPOTHYROIDISM: ICD-10-CM

## 2022-05-31 NOTE — PROGRESS NOTES
5/31/2022- JL- EXPLAINED THAT SHE IS NOT ELIGIBLE FOR NEW FRAME UNTIL 7/1/2022. SHE STATED SHE WANTS N20 CUSHIONS NOT F30I CUSHIONS.   I CALLED TO SERJIO OLMEDO  ([facility]) TO MAKE CHANGES TO HER ORDER.

## 2022-06-01 ENCOUNTER — LAB (OUTPATIENT)
Dept: LAB | Facility: HOSPITAL | Age: 32
End: 2022-06-01
Payer: MEDICARE

## 2022-06-01 DIAGNOSIS — Z94.0 KIDNEY REPLACED BY TRANSPLANT: ICD-10-CM

## 2022-06-01 DIAGNOSIS — Z48.298 AFTERCARE FOLLOWING ORGAN TRANSPLANT: ICD-10-CM

## 2022-06-01 DIAGNOSIS — Z79.899 ENCOUNTER FOR LONG-TERM CURRENT USE OF MEDICATION: ICD-10-CM

## 2022-06-01 LAB
ANION GAP SERPL CALCULATED.3IONS-SCNC: 7 MMOL/L (ref 5–18)
BUN SERPL-MCNC: 25 MG/DL (ref 8–22)
CALCIUM SERPL-MCNC: 10.3 MG/DL (ref 8.5–10.5)
CHLORIDE BLD-SCNC: 109 MMOL/L (ref 98–107)
CO2 SERPL-SCNC: 22 MMOL/L (ref 22–31)
CREAT SERPL-MCNC: 0.82 MG/DL (ref 0.6–1.1)
ERYTHROCYTE [DISTWIDTH] IN BLOOD BY AUTOMATED COUNT: 13.2 % (ref 10–15)
GFR SERPL CREATININE-BSD FRML MDRD: >90 ML/MIN/1.73M2
GLUCOSE BLD-MCNC: 88 MG/DL (ref 70–125)
HCT VFR BLD AUTO: 38.3 % (ref 35–47)
HGB BLD-MCNC: 11.4 G/DL (ref 11.7–15.7)
MCH RBC QN AUTO: 28.6 PG (ref 26.5–33)
MCHC RBC AUTO-ENTMCNC: 29.8 G/DL (ref 31.5–36.5)
MCV RBC AUTO: 96 FL (ref 78–100)
PHOSPHATE SERPL-MCNC: 2.1 MG/DL (ref 2.5–4.5)
PLATELET # BLD AUTO: 194 10E3/UL (ref 150–450)
POTASSIUM BLD-SCNC: 4.6 MMOL/L (ref 3.5–5)
RBC # BLD AUTO: 3.98 10E6/UL (ref 3.8–5.2)
SODIUM SERPL-SCNC: 138 MMOL/L (ref 136–145)
TACROLIMUS BLD-MCNC: 4.4 UG/L (ref 5–15)
TME LAST DOSE: ABNORMAL H
TME LAST DOSE: ABNORMAL H
WBC # BLD AUTO: 6.4 10E3/UL (ref 4–11)

## 2022-06-01 PROCEDURE — 36415 COLL VENOUS BLD VENIPUNCTURE: CPT

## 2022-06-01 PROCEDURE — 84100 ASSAY OF PHOSPHORUS: CPT

## 2022-06-01 PROCEDURE — 80197 ASSAY OF TACROLIMUS: CPT

## 2022-06-01 PROCEDURE — 85027 COMPLETE CBC AUTOMATED: CPT

## 2022-06-01 PROCEDURE — 80048 BASIC METABOLIC PNL TOTAL CA: CPT

## 2022-06-04 RX ORDER — LEVOTHYROXINE SODIUM 25 UG/1
TABLET ORAL
Qty: 105 TABLET | Refills: 0 | Status: SHIPPED | OUTPATIENT
Start: 2022-06-04 | End: 2022-08-10

## 2022-06-05 NOTE — TELEPHONE ENCOUNTER
levothyroxine (SYNTHROID/LEVOTHROID) 25 MCG tablet   TAKE 1 TABLET(25MCG) BY MOUTH TUKENNETH, THAPOLONIA, SAT AND SUN AND 1 A ND 1/ 2 TABLETS( 37.5 MCG) ON MON WED AND FRI     Last Written Prescription Date:  5/11/21  Last Fill Quantity: 105,   # refills: 3  Last Office Visit : 11/16/21  Future Office visit:  8/10/22    Refill to pharmacy.     Lab Test 11/11/21  0921   TSH 2.49

## 2022-06-07 NOTE — PROGRESS NOTES
[facility]  Encounter Date: Jun 8, 2022  Office Visit     Dermatology Problem List:  1. Keratosis Pilaris  - Past tx: Amlactin  2. Renal transplant for IgA nephropathy in 8/2019 c/b hydronephrosis.   - currently: myfortic and tacrolimus  - annual skin check. Last: 6/8/2022  3. Nodule in right axillary vault  - ordered US  4. Milial cyst in left labia majora  5. Non-scarring hair loss   ____________________________________________    Assessment & Plan:     # Renal transplant for IgA nephropathy in 8/2019 c/b hydronephrosis.   - currently: myfortic and tacrolimus  - annual skin check. Last: 6/8/2022  - recommended SPF 30 or above    # Nodule in right axillary vault  Patient endorses 3 year history of lesion in her right axillary vault.  Ddx: Lipoma vs Angiolipoma vs Inflammed LN vs pilar cyst. Most likely to be a lipoma or cyst due to chronicity. Due to the new mild tenderness, will work up.  - US ordered    # Benign melanocytic nevi of the trunk  # Milial cyst on right labia majora  - reassurance provided; no lesions concerning for malignancy  - photoprotection (regular use of SPF30+ broad spectrum sunscreen and sun protective clothing) recommended  - ABCDE of melanoma discussed    # Keratosis pilaris  Patient is no longer concerned by her KP. She is not interested in treating.  - Past tx: Amlactin BID    # Non-scarring hair loss  Likely secondary to medication, seb derm, telogen effluvium hair loss, or female pattern androgenic alopecia. No pruritis or burning. Will work up and treat at separate visit.     Follow-up: 4-6 weeks for hair loss    Staff, Medical Student and Scribe:     Scribe Disclosure:  Delicia HAYNES, am serving as a scribe to document services personally performed by Baudilio Arellano MD based on data collection and the provider's statements to me.     Dana HAYNES, MS4 saw and staffed the patient with Baudilio Arellano MD.  [facility]  [employer]    Provider Disclosure:   The documentation recorded by the scribe accurately reflects the services I personally performed and the decisions made by me.    Staff Physician:  I was present with the medical student who participated in the service and in the documentation of the note. I have verified the history and personally performed the physical exam and medical decision making. I agree with the assessment and plan of care as documented in the note.     Baudilio Arellano MD  Pronouns: he/him/his    Department of Dermatology  [facility]: Phone: [phone], Fax:[phone]  [facility]: Phone: [phone] Fax: [phone]  ____________________________________________    CC: Skin Check    HPI:  Ms. Mona Wagner is a(n) 31 year old female who presents today as a return patient for FBSE in the context of renal transplant on myfortic and tacrolimus. The patient was last seen in dermatology on 4/13/21 by Dr. Guadalupe at which time skin exam was performed and she was started on OTC amlactin BID for treatment of keratosis pilaris.    Lesion of concern:   - 1 month ago, she noticed an asymptomatic bump on her left groin.  - 3 years of a lump in her right axillary vault. Asymptomatic. Told it was a LN by her PCP.    No longer using Amlactin for keratosis pilaris.     Presents with 1 months of increased hair shedding and thinning all over the scalp. Potential triggers of switching to azathioprine (5/16/22) and covid infection (5/2/22).     Patient is otherwise feeling well, without additional skin concerns.    Labs Reviewed:  N/A    Physical Exam:  Vitals: There were no vitals taken for this visit.  SKIN: Full skin, which includes the head/face, both arms, chest, back, abdomen,both legs, genitalia and/or groin buttocks, digits and/or nails, was examined.  - Scalp: erythema, scale, and perfollicular  pustules without loss of follicles. Payan Satya type II with increased part width anteriorly.   - Well circumscribed symmetric brown macules and papules on the body  - Left labia majora: firm light yellow oval papule  - Right axillary vault: Mildly tender 1cm rubbery nodule  - Small perifollicular papules on the arms and thighs  - No other lesions of concern on areas examined.     Medications:  Current Outpatient Medications   Medication     acetaminophen (TYLENOL) 325 MG tablet     aspirin (ASA) 81 MG chewable tablet     atorvastatin (LIPITOR) 10 MG tablet     azaTHIOprine (IMURAN) 50 MG tablet     diphenhydrAMINE (BENADRYL) 25 MG capsule     EPINEPHrine (ANY BX GENERIC EQUIV) 0.3 MG/0.3ML injection 2-pack     famotidine (PEPCID) 20 MG tablet     fludrocortisone (FLORINEF) 0.1 MG tablet     guaiFENesin (MUCINEX) 600 MG 12 hr tablet     hydrOXYzine (ATARAX) 10 MG tablet     lactobacillus rhamnosus, GG, (CULTURELL) capsule     levothyroxine (SYNTHROID/LEVOTHROID) 25 MCG tablet     mycophenolic acid (GENERIC EQUIVALENT) 180 MG EC tablet     nirmatrelvir and ritonavir (PAXLOVID) therapy pack     norethindrone (MICRONOR) 0.35 MG tablet     predniSONE (DELTASONE) 10 MG tablet     Prenatal Vit-Fe Fumarate-FA (PRENATAL MULTIVITAMIN W/IRON) 27-0.8 MG tablet     Prenatal Vit-Fe Fumarate-FA (PRENATAL VITAMINS) 28-0.8 MG TABS     senna-docusate (SENOKOT-S/PERICOLACE) 8.6-50 MG tablet     simethicone (MYLICON) 125 MG chewable tablet     sodium chloride (OCEAN) 0.65 % nasal spray     tacrolimus (GENERIC EQUIVALENT) 0.5 MG capsule     tacrolimus (GENERIC EQUIVALENT) 1 MG capsule     No current facility-administered medications for this visit.      Past Medical History:   Patient Active Problem List   Diagnosis     DVT of upper extremity (deep vein thrombosis) (H)     Seizure disorder (H)     Galactorrhea     Acquired hypothyroidism     Increased prolactin level     Hypomagnesemia     Infective endocarditis-history of.   1/2019.  resolved.      Aftercare following organ transplant     Immunosuppression (H)     Methemoglobinemia     Kidney replaced by transplant     Anxiety     Secondary renal hyperparathyroidism (H)     Vitamin D deficiency     CKD (chronic kidney disease) stage 3, GFR 30-59 ml/min (H)     Stricture or kinking of ureter     Pyelonephritis     Diarrhea     Bicornate uterus     Normal Pap 3/2020.  repeat 3/2023     Prophylactic antibiotic     Hydronephrosis     Hydronephrosis of kidney transplant     Ureter, stricture     Urinary tract infection     Past Medical History:   Diagnosis Date     Anemia in chronic kidney disease      History of bacterial endocarditis      History of blood transfusion      History of DVT (deep vein thrombosis) 2014     History of seizure 2014     History of subarachnoid hemorrhage      Hydronephrosis      Hyperprolactinemia (H)      Hypertension     resolved after transplant     Hypothyroidism      Kidney transplanted 08/03/2019    DCD DDKT. Induction with thymo 6mg/kg.     Orthostatic hypotension      FATOUMATA (obstructive sleep apnea)      Pyelonephritis of transplanted kidney 01/23/2020     Secondary renal hyperparathyroidism (H)      Urinary tract infection

## 2022-06-08 ENCOUNTER — OFFICE VISIT (OUTPATIENT)
Dept: DERMATOLOGY | Facility: CLINIC | Age: 32
End: 2022-06-08
Payer: MEDICARE

## 2022-06-08 DIAGNOSIS — D22.9 MULTIPLE BENIGN NEVI: ICD-10-CM

## 2022-06-08 DIAGNOSIS — R22.31 NODULE OF SKIN OF RIGHT UPPER EXTREMITY: Primary | ICD-10-CM

## 2022-06-08 DIAGNOSIS — L72.0 MILIAL CYST: ICD-10-CM

## 2022-06-08 DIAGNOSIS — Z94.0 HISTORY OF RENAL TRANSPLANT: ICD-10-CM

## 2022-06-08 DIAGNOSIS — L85.8 KP (KERATOSIS PILARIS): ICD-10-CM

## 2022-06-08 PROCEDURE — 99213 OFFICE O/P EST LOW 20 MIN: CPT | Performed by: DERMATOLOGY

## 2022-06-08 ASSESSMENT — PAIN SCALES - GENERAL: PAINLEVEL: NO PAIN (0)

## 2022-06-08 NOTE — NURSING NOTE
Dermatology Rooming Note    Mona Wagner's goals for this visit include:   Chief Complaint   Patient presents with     Skin Check     Triny Kunz, Visit Facilitator

## 2022-06-08 NOTE — LETTER
6/8/2022       RE: Mona Wagner  [address]  [facility] [id number]     Dear Colleague,    Thank you for referring your patient, Mona Wagner, to the [facility] DERMATOLOGY CLINIC [place] [facility]. Please see a copy of my visit note below.    [facility] Dermatology Note  Encounter Date: Jun 8, 2022  Office Visit     Dermatology Problem List:  1. Keratosis Pilaris  - Past tx: Amlactin  2. Renal transplant for IgA nephropathy in 8/2019 c/b hydronephrosis.   - currently: myfortic and tacrolimus  - annual skin check. Last: 6/8/2022  3. Nodule in right axillary vault  - ordered US  4. Milial cyst in left labia majora  5. Non-scarring hair loss   ____________________________________________    Assessment & Plan:     # Renal transplant for IgA nephropathy in 8/2019 c/b hydronephrosis.   - currently: myfortic and tacrolimus  - annual skin check. Last: 6/8/2022  - recommended SPF 30 or above    # Nodule in right axillary vault  Patient endorses 3 year history of lesion in her right axillary vault.  Ddx: Lipoma vs Angiolipoma vs Inflammed LN vs pilar cyst. Most likely to be a lipoma or cyst due to chronicity. Due to the new mild tenderness, will work up.  - US ordered    # Benign melanocytic nevi of the trunk  # Milial cyst on right labia majora  - reassurance provided; no lesions concerning for malignancy  - photoprotection (regular use of SPF30+ broad spectrum sunscreen and sun protective clothing) recommended  - ABCDE of melanoma discussed    # Keratosis pilaris  Patient is no longer concerned by her KP. She is not interested in treating.  - Past tx: Amlactin BID    # Non-scarring hair loss  Likely secondary to medication, seb derm, telogen effluvium hair loss, or female pattern androgenic alopecia. No pruritis or burning. Will work up and treat at separate visit.     Follow-up: 4-6 weeks for hair loss    Staff, Medical Student and  Scribe:     Scribe Disclosure:  I, Delicia Olson, am serving as a scribe to document services personally performed by Baudilio Arellano MD based on data collection and the provider's statements to me.     I Danamary beth Phan, MS4 saw and staffed the patient with Baudilio Arellano MD.  [employer]    Provider Disclosure:   The documentation recorded by the scribe accurately reflects the services I personally performed and the decisions made by me.    Staff Physician:  I was present with the medical student who participated in the service and in the documentation of the note. I have verified the history and personally performed the physical exam and medical decision making. I agree with the assessment and plan of care as documented in the note.     Baudilio Arellano MD  Pronouns: he/him/his    Department of Dermatology  [facility]: Phone: [phone], Fax:[phone]  [facility]: Phone: [phone] Fax: [phone]  ____________________________________________    CC: Skin Check    HPI:  Ms. Mona Wagner is a(n) 31 year old female who presents today as a return patient for FBSE in the context of renal transplant on myfortic and tacrolimus. The patient was last seen in dermatology on 4/13/21 by Dr. Guadalupe at which time skin exam was performed and she was started on OTC amlactin BID for treatment of keratosis pilaris.    Lesion of concern:   - 1 month ago, she noticed an asymptomatic bump on her left groin.  - 3 years of a lump in her right axillary vault. Asymptomatic. Told it was a LN by her PCP.    No longer using Amlactin for keratosis pilaris.     Presents with 1 months of increased hair shedding and thinning all over the scalp. Potential triggers of switching to azathioprine (5/16/22) and covid infection (5/2/22).     Patient is otherwise feeling well, without additional  skin concerns.    Labs Reviewed:  N/A    Physical Exam:  Vitals: There were no vitals taken for this visit.  SKIN: Full skin, which includes the head/face, both arms, chest, back, abdomen,both legs, genitalia and/or groin buttocks, digits and/or nails, was examined.  - Scalp: erythema, scale, and perfollicular pustules without loss of follicles. Payan Satya type II with increased part width anteriorly.   - Well circumscribed symmetric brown macules and papules on the body  - Left labia majora: firm light yellow oval papule  - Right axillary vault: Mildly tender 1cm rubbery nodule  - Small perifollicular papules on the arms and thighs  - No other lesions of concern on areas examined.     Medications:  Current Outpatient Medications   Medication     acetaminophen (TYLENOL) 325 MG tablet     aspirin (ASA) 81 MG chewable tablet     atorvastatin (LIPITOR) 10 MG tablet     azaTHIOprine (IMURAN) 50 MG tablet     diphenhydrAMINE (BENADRYL) 25 MG capsule     EPINEPHrine (ANY BX GENERIC EQUIV) 0.3 MG/0.3ML injection 2-pack     famotidine (PEPCID) 20 MG tablet     fludrocortisone (FLORINEF) 0.1 MG tablet     guaiFENesin (MUCINEX) 600 MG 12 hr tablet     hydrOXYzine (ATARAX) 10 MG tablet     lactobacillus rhamnosus, GG, (CULTURELL) capsule     levothyroxine (SYNTHROID/LEVOTHROID) 25 MCG tablet     mycophenolic acid (GENERIC EQUIVALENT) 180 MG EC tablet     nirmatrelvir and ritonavir (PAXLOVID) therapy pack     norethindrone (MICRONOR) 0.35 MG tablet     predniSONE (DELTASONE) 10 MG tablet     Prenatal Vit-Fe Fumarate-FA (PRENATAL MULTIVITAMIN W/IRON) 27-0.8 MG tablet     Prenatal Vit-Fe Fumarate-FA (PRENATAL VITAMINS) 28-0.8 MG TABS     senna-docusate (SENOKOT-S/PERICOLACE) 8.6-50 MG tablet     simethicone (MYLICON) 125 MG chewable tablet     sodium chloride (OCEAN) 0.65 % nasal spray     tacrolimus (GENERIC EQUIVALENT) 0.5 MG capsule     tacrolimus (GENERIC EQUIVALENT) 1 MG capsule     No current facility-administered  medications for this visit.      Past Medical History:   Patient Active Problem List   Diagnosis     DVT of upper extremity (deep vein thrombosis) (H)     Seizure disorder (H)     Galactorrhea     Acquired hypothyroidism     Increased prolactin level     Hypomagnesemia     Infective endocarditis-history of.  1/2019.  resolved.      Aftercare following organ transplant     Immunosuppression (H)     Methemoglobinemia     Kidney replaced by transplant     Anxiety     Secondary renal hyperparathyroidism (H)     Vitamin D deficiency     CKD (chronic kidney disease) stage 3, GFR 30-59 ml/min (H)     Stricture or kinking of ureter     Pyelonephritis     Diarrhea     Bicornate uterus     Normal Pap 3/2020.  repeat 3/2023     Prophylactic antibiotic     Hydronephrosis     Hydronephrosis of kidney transplant     Ureter, stricture     Urinary tract infection     Past Medical History:   Diagnosis Date     Anemia in chronic kidney disease      History of bacterial endocarditis      History of blood transfusion      History of DVT (deep vein thrombosis) 2014     History of seizure 2014     History of subarachnoid hemorrhage      Hydronephrosis      Hyperprolactinemia (H)      Hypertension     resolved after transplant     Hypothyroidism      Kidney transplanted 08/03/2019    DCD DDKT. Induction with thymo 6mg/kg.     Orthostatic hypotension      FATOUMATA (obstructive sleep apnea)      Pyelonephritis of transplanted kidney 01/23/2020     Secondary renal hyperparathyroidism (H)      Urinary tract infection

## 2022-06-08 NOTE — PATIENT INSTRUCTIONS
The bump in your armpit  - I think we should assess your bump with an Ultrasound.    Bump on your groin  - Likely a milial cyst. This is benign.

## 2022-06-10 DIAGNOSIS — Z48.298 AFTERCARE FOLLOWING ORGAN TRANSPLANT: Primary | ICD-10-CM

## 2022-06-15 ENCOUNTER — LAB (OUTPATIENT)
Dept: LAB | Facility: CLINIC | Age: 32
End: 2022-06-15
Payer: MEDICARE

## 2022-06-15 DIAGNOSIS — Z48.298 AFTERCARE FOLLOWING ORGAN TRANSPLANT: ICD-10-CM

## 2022-06-15 LAB
ALBUMIN UR-MCNC: NEGATIVE MG/DL
APPEARANCE UR: CLEAR
BACTERIA #/AREA URNS HPF: ABNORMAL /HPF
BILIRUB UR QL STRIP: NEGATIVE
COLOR UR AUTO: YELLOW
GLUCOSE UR STRIP-MCNC: NEGATIVE MG/DL
HGB UR QL STRIP: NEGATIVE
KETONES UR STRIP-MCNC: NEGATIVE MG/DL
LEUKOCYTE ESTERASE UR QL STRIP: NEGATIVE
NITRATE UR QL: NEGATIVE
PH UR STRIP: 6 [PH] (ref 5–7)
RBC #/AREA URNS AUTO: ABNORMAL /HPF
SP GR UR STRIP: 1.01 (ref 1–1.03)
UROBILINOGEN UR STRIP-ACNC: 0.2 E.U./DL
WBC #/AREA URNS AUTO: ABNORMAL /HPF

## 2022-06-15 PROCEDURE — 81001 URINALYSIS AUTO W/SCOPE: CPT

## 2022-06-17 ENCOUNTER — VIRTUAL VISIT (OUTPATIENT)
Dept: SLEEP MEDICINE | Facility: CLINIC | Age: 32
End: 2022-06-17
Payer: MEDICARE

## 2022-06-17 VITALS — BODY MASS INDEX: 30.43 KG/M2 | HEIGHT: 60 IN | WEIGHT: 155 LBS

## 2022-06-17 DIAGNOSIS — F41.9 ANXIETY: ICD-10-CM

## 2022-06-17 DIAGNOSIS — G47.33 OBSTRUCTIVE SLEEP APNEA: Primary | ICD-10-CM

## 2022-06-17 PROCEDURE — 99214 OFFICE O/P EST MOD 30 MIN: CPT | Mod: 95 | Performed by: INTERNAL MEDICINE

## 2022-06-17 ASSESSMENT — SLEEP AND FATIGUE QUESTIONNAIRES
HOW LIKELY ARE YOU TO NOD OFF OR FALL ASLEEP WHILE SITTING QUIETLY AFTER LUNCH WITHOUT ALCOHOL: WOULD NEVER DOZE
HOW LIKELY ARE YOU TO NOD OFF OR FALL ASLEEP WHILE SITTING AND TALKING TO SOMEONE: WOULD NEVER DOZE
HOW LIKELY ARE YOU TO NOD OFF OR FALL ASLEEP IN A CAR, WHILE STOPPED FOR A FEW MINUTES IN TRAFFIC: WOULD NEVER DOZE
HOW LIKELY ARE YOU TO NOD OFF OR FALL ASLEEP WHILE SITTING AND READING: SLIGHT CHANCE OF DOZING
HOW LIKELY ARE YOU TO NOD OFF OR FALL ASLEEP WHILE LYING DOWN TO REST IN THE AFTERNOON WHEN CIRCUMSTANCES PERMIT: WOULD NEVER DOZE
HOW LIKELY ARE YOU TO NOD OFF OR FALL ASLEEP WHEN YOU ARE A PASSENGER IN A CAR FOR AN HOUR WITHOUT A BREAK: MODERATE CHANCE OF DOZING
HOW LIKELY ARE YOU TO NOD OFF OR FALL ASLEEP WHILE SITTING INACTIVE IN A PUBLIC PLACE: WOULD NEVER DOZE
HOW LIKELY ARE YOU TO NOD OFF OR FALL ASLEEP WHILE WATCHING TV: MODERATE CHANCE OF DOZING

## 2022-06-17 NOTE — PATIENT INSTRUCTIONS
Sleep Therapy Supplies  Replacement Schedule  Part Replace it:   Mask and headgear 1 every 6 months   Headgear 1 every 6 months   Chinstrap 1 every 6 months   Humidifier chamber 1 every 6 months   Reusable filters 1 pack every 6 months   Mask alone 1 every 3 months   Tubing (6-foot, 10-foot and heated) 1 every 3 months   Disposable filters 6 packs every 3 months   Cushion (nasal or full face) 2 per month   Nasal pillows 2 per month   Note:  We do not order masks without headgear. If you order a mask at 3 months, the headgear is included; we do not bill you for this.  Medical Assistance only pays for 1 nasal cushion, 1 full-face cushion and 1 nasal pillow each month.  Medicare only pays for 1 full-face cushion each month (but 2 nasal cushions).  DC adapters are available for cash sale only.  Get free shipping with any order over $50.  For informational purposes only. Not to replace the advice of your health care provider.   Copyright   2013 [facility]. All rights reserved. [employer] 011946 - REV 07/17.

## 2022-06-17 NOTE — PROGRESS NOTES
Mona is a 31 year old who is being evaluated via a billable video visit.      How would you like to obtain your AVS? MyChart  If the video visit is dropped, the invitation should be resent by: Send to e-mail at: [email or web address]  Will anyone else be joining your video visit? No  Video-Visit Details    Video Start Time: 10:54 AM    Type of service:  Video Visit    Video End Time:11:01 AM    Originating Location (pt. Location): Home    Distant Location (provider location):  [facility] SLEEP [facility]     Platform used for Video Visit: AmVac     Additional 10 minutes on the date of service was spent performing the following:    -Preparing to see the patient  -Ordering medications, tests, or procedures   -Documenting clinical information in the electronic or other health record     Thank you for the opportunity to participate in the care of Mona Wagner.     She is a 31 year old y/o female patient who comes to the sleep medicine clinic for follow up.  The patient was diagnosed with FATOUMATA on 10/08/21 (AHI=9.4). This is the patient's 1st clinical visit since starting CPAP therapy. The patient states that her sleep quality has improved since starting CPAP and that she is no longer waking up with headaches. She also reports that her anxiety has decreased.     Assessment and Plan:  In summary Mona Wagner is a 31 year old year old female who is here for follow up.    1. Obstructive sleep apnea  I congratulated the patient on her excellent CPAP usage. I will keep her on the same pressure range as per her requests.    2. Anxiety  Improving       6/16/2022   Number days on 77   AHI Rolling Average 14 Day (New) 1.25   AHI Rolling Average 30 Days (New) 1.32   AHI Rolling Average 180 Days (New) 1.43   Apnea-Hypopnea Index 0.7  Apnea-hypopnea index in events per hour   Obstructive Apnea Index 0.1  Obstructive apnea index in events per hour   Central Apnea Index 0.5  Central apnea index in events per hour   Hypopnea  Index 0  Hypopnea index in events per hour   (Retired) % compliance greater than four hours accumulative [id number]   % compliance greater than four hours rolling average 14 days 85   Time mask on face 14 day average 364   Time mask on face 30 day average 389   Mask Removal Events 8  Number of mask on/off events   Usage 310  Total usage duration in minutes   95% of leak in LPM (ResMed) 8.4   95% OF Leak in litres Rolling Average 14 Days 4.46   95% OF Leak in litres Rolling Average 30 Days 4.52   Minimum CPAP/ASV Pressure setting 5.0  Minimum allowable pressure in cmH2O   Maximum CPAP/ASV Pressure setting 15.0  Maximum allowable pressure in cmH2O   IPAP 95% 8.4  95% of target IPAP in cmH2O   Target IPAP (95% of Target) 14 day average (Resmed) 9.4   Target IPAP (95% of Target) 30 day average (Resmed) 8.9     Lab reviewed: Discussed with patient.    Sleep-Wake Cycle:    The patient likes to initiate sleep at around 11 PM to 1 AM with a sleep latency of less than 20 minutes. The patient has 2-3 nocturnal awakenings. Final wake up time is around 9:30 AM.    ALEXIS:  LAEXIS Total Score: 11  Total score - [place]: 5 (6/17/2022 10:48 AM)        Patient Active Problem List   Diagnosis     DVT of upper extremity (deep vein thrombosis) (H)     Seizure disorder (H)     Galactorrhea     Acquired hypothyroidism     Increased prolactin level     Hypomagnesemia     Infective endocarditis-history of.  1/2019.  resolved.      Aftercare following organ transplant     Immunosuppression (H)     Methemoglobinemia     Kidney replaced by transplant     Anxiety     Secondary renal hyperparathyroidism (H)     Vitamin D deficiency     CKD (chronic kidney disease) stage 3, GFR 30-59 ml/min (H)     Stricture or kinking of ureter     Pyelonephritis     Diarrhea     Bicornate uterus     Normal Pap 3/2020.  repeat 3/2023     Prophylactic antibiotic     Hydronephrosis     Hydronephrosis of kidney transplant     Ureter, stricture     Urinary  tract infection       Past Medical History:   Diagnosis Date     Anemia in chronic kidney disease      History of bacterial endocarditis      History of blood transfusion      History of DVT (deep vein thrombosis) 2014     History of seizure 2014     History of subarachnoid hemorrhage      Hydronephrosis      Hyperprolactinemia (H)      Hypertension     resolved after transplant     Hypothyroidism      Kidney transplanted 08/03/2019    DCD DDKT. Induction with thymo 6mg/kg.     Orthostatic hypotension      FATOUMATA (obstructive sleep apnea)      Pyelonephritis of transplanted kidney 01/23/2020     Secondary renal hyperparathyroidism (H)      Urinary tract infection        Past Surgical History:   Procedure Laterality Date     BENCH KIDNEY N/A 8/3/2019    Procedure: BACKBENCH PREPARATION, ALLOGRAFT, KIDNEY;  Surgeon: Dorian Johnson MD;  Location: [facility] OR     COMBINED CYSTOSCOPY, RETROGRADES, EXCHANGE STENT URETER(S) Right 11/23/2020    Procedure: CYSTOSCOPY, CYSOTGRAM, WITH RETROGRADE PYELOGRAM, ureteroscopy,  AND URETERAL STENT;  Surgeon: Wally Britt MD;  Location: [facility] OR     COMBINED CYSTOSCOPY, RETROGRADES, URETEROSCOPY, LASER HOLMIUM LITHOTRIPSY URETER(S), INSERT STENT N/A 12/6/2019    Procedure: CYSTOURETEROSCOPY, WITH RETROGRADE PYELOGRAM of transplant kidney, STENT INSERTION, Laser on standby;  Surgeon: Wally Britt MD;  Location: [facility] OR     CREATE FISTULA ARTERIOVENOUS UPPER EXTREMITY  1/2/2014    Procedure: CREATE FISTULA ARTERIOVENOUS UPPER EXTREMITY;  Left Wrist Arteriovenous Fistula Placement;  Surgeon: Shashi Castro MD;  Location: [facility] OR     CREATE FISTULA ARTERIOVENOUS UPPER EXTREMITY       CYSTOSCOPY, RETROGRADES, INSERT STENT URETER(S), COMBINED N/A 8/20/2020    Procedure: CYSTOSCOPY, WITH RETROGRADE PYELOGRAM, CYSTOGRAM AND URETERAL STENT INSERTION - TRANSPLANT KIDNEY;  Surgeon: Wally Britt MD;  Location: [facility] OR     IR CEREBRAL ANGIOGRAM  9/17/2014     PERCUTANEOUS BIOPSY  KIDNEY Right 2019    Procedure: Right Kidney Biopsy;  Surgeon: Deny Rios MD;  Location:  OR     RASTA/DIALYSIS CATHETER  12/10/2013          TRANSPLANT KIDNEY RECIPIENT  DONOR N/A 8/3/2019    Procedure: TRANSPLANT, KIDNEY, RECIPIENT,  DONOR with Ureteral Stent Placement;  Surgeon: Dorian Johnson MD;  Location: [facility] OR       Social History     Socioeconomic History     Marital status: Single     Spouse name: Not on file     Number of children: 0     Years of education: Not on file     Highest education level: Not on file   Occupational History     Occupation: Pharmacy Technician   Tobacco Use     Smoking status: Never Smoker     Smokeless tobacco: Never Used   Vaping Use     Vaping Use: Never used   Substance and Sexual Activity     Alcohol use: No     Alcohol/week: 0.0 standard drinks     Drug use: No     Sexual activity: Yes     Partners: Male   Other Topics Concern     Parent/sibling w/ CABG, MI or angioplasty before 65F 55M? Not Asked   Social History Narrative    Date of Service:5/15/2013     Name: Mona VALDOVINOS (Month, Day, Year of birth): 1990     MRN: [id number]     New Patient: Yes    Preferred Language: English     Needed: No    County of Residence: [place]    Marital Status:     Household size: 8    Number of Dependents:0     Pregnant: 0    Average Monthly Income: $ 600    Citizenship Status: Citizen    Gave Pt [employer] and [place] applications     Social Determinants of Health     Financial Resource Strain: Not on file   Food Insecurity: Not on file   Transportation Needs: Not on file   Physical Activity: Not on file   Stress: Not on file   Social Connections: Not on file   Intimate Partner Violence: Not on file   Housing Stability: Not on file       Current Outpatient Medications   Medication Sig Dispense Refill     acetaminophen (TYLENOL) 325 MG tablet Take 2 tablets (650 mg) by mouth every 4 hours as needed for mild pain 100 tablet 3      aspirin (ASA) 81 MG chewable tablet Take 1 tablet (81 mg) by mouth daily 90 tablet 3     atorvastatin (LIPITOR) 10 MG tablet Take 1 tablet (10 mg) by mouth every morning 90 tablet 3     azaTHIOprine (IMURAN) 50 MG tablet Take 3 tablets (150 mg) by mouth daily 270 tablet 3     diphenhydrAMINE (BENADRYL) 25 MG capsule Take 1 capsule (25 mg) by mouth daily as needed for itching or allergies Take 1-2 tablets by mouth every 6 hours PRN itching/allergies 60 capsule 0     EPINEPHrine (ANY BX GENERIC EQUIV) 0.3 MG/0.3ML injection 2-pack        famotidine (PEPCID) 20 MG tablet Take 1 tablet (20 mg) by mouth 2 times daily 60 tablet 3     fludrocortisone (FLORINEF) 0.1 MG tablet Take 1 tablet (0.1 mg) by mouth Every Mon, Wed, Fri Morning 90 tablet 1     guaiFENesin (MUCINEX) 600 MG 12 hr tablet Take 2 tablets (1,200 mg) by mouth 2 times daily (Patient not taking: Reported on 6/8/2022) 45 tablet 0     hydrOXYzine (ATARAX) 10 MG tablet Take 1 tablet (10 mg) by mouth 3 times daily as needed for anxiety 20 tablet 0     lactobacillus rhamnosus, GG, (CULTURELL) capsule Take 1 capsule by mouth 2 times daily  (Patient not taking: Reported on 6/8/2022)       levothyroxine (SYNTHROID/LEVOTHROID) 25 MCG tablet TAKE 1 TABLET BY MOUTH ON TUESDAY, THURSDAY, SATURDAY, AND SUNDAY, TAKE 1.5 TABLETS(37.5 MCG) ON MONDAY, WEDNESDAY, AND FRIDAY 105 tablet 0     mycophenolic acid (GENERIC EQUIVALENT) 180 MG EC tablet Take 2 tablets (360 mg) by mouth 2 times daily (Patient not taking: Reported on 6/8/2022) 12 tablet 0     nirmatrelvir and ritonavir (PAXLOVID) therapy pack Take 3 tablets by mouth 2 times daily (Patient not taking: Reported on 6/8/2022) 30 each 0     norethindrone (MICRONOR) 0.35 MG tablet Take one tablet daily 84 tablet 3     predniSONE (DELTASONE) 10 MG tablet Take 1 tablet (10 mg) by mouth daily (Patient not taking: Reported on 6/8/2022) 5 tablet 0     Prenatal Vit-Fe Fumarate-FA (PRENATAL MULTIVITAMIN W/IRON) 27-0.8 MG tablet Take  1 tablet by mouth daily 90 tablet 3     Prenatal Vit-Fe Fumarate-FA (PRENATAL VITAMINS) 28-0.8 MG TABS Take 1 tablet by mouth daily 90 tablet 3     senna-docusate (SENOKOT-S/PERICOLACE) 8.6-50 MG tablet Take 1-2 tablets by mouth 2 times daily (Patient taking differently: Take 1-2 tablets by mouth 2 times daily as needed) 30 tablet 0     simethicone (MYLICON) 125 MG chewable tablet Take 1 tablet (125 mg) by mouth 4 times daily as needed for intestinal gas 120 tablet 3     sodium chloride (OCEAN) 0.65 % nasal spray Spray one spray in each nostril three times daily for congestion 30 mL 0     tacrolimus (GENERIC EQUIVALENT) 0.5 MG capsule HOLD 60 capsule 11     tacrolimus (GENERIC EQUIVALENT) 1 MG capsule Take 3 capsules (3 mg) by mouth 2 times daily 180 capsule 11       Allergies   Allergen Reactions     Contrast Dye Rash     CT contrast allergy 12/14/19 rash over eyes. Need to have pre medication before a CT WITH CONTRAST      Lisinopril Swelling     angioedema     Nitrous Oxide Other (See Comments)     Sense of doom     Chlorhexidine Rash     Rash at site     Sulfa Drugs Rash     Muscle stiffness of neck     Dapsone      Methemoglobinemia     Furosemide Other (See Comments)     Skin flushing     Metrogel [Metronidazole]      Hives diffusely on body after vaginal administration.     Azithromycin Dizziness and Rash     Cefuroxime Rash       Physical Exam:  GEN: NAD,  Psych: normal mood, normal affect    Labs/Studies:      Lab Results   Component Value Date    PH 7.36 04/01/2022     Lab Results   Component Value Date    TSH 2.49 11/11/2021    TSH 2.14 06/02/2021     Lab Results   Component Value Date    GLC 88 06/01/2022    GLC 94 04/01/2022     Lab Results   Component Value Date    HGB 11.4 (L) 06/01/2022    HGB 12.4 04/01/2022     Lab Results   Component Value Date    BUN 25 (H) 06/01/2022    BUN 19 04/01/2022    CR 0.82 06/01/2022    CR 0.79 04/01/2022     Lab Results   Component Value Date    AST 11 08/04/2020     AST 13 02/03/2020    ALT 19 08/04/2020    ALT 20 02/03/2020    ALKPHOS 185 (H) 08/04/2020    ALKPHOS 126 02/03/2020    BILITOTAL 0.7 08/04/2020    BILITOTAL 0.6 02/03/2020    BILICONJ 0.0 12/10/2013    BILIDIRECT 0.2 12/03/2013     No results found for: UAMP, UBARB, BENZODIAZEUR, UCANN, UCOC, OPIT, UPCP    Recent Labs   Lab Test 06/01/22  1020 04/01/22  1000    139   POTASSIUM 4.6 4.4   CHLORIDE 109* 110*   CO2 22 20*   ANIONGAP 7 9   GLC 88 94   BUN 25* 19   CR 0.82 0.79   SHAMIR 10.3 10.3       Ferritin   Date Value Ref Range Status   08/04/2020 1,065 (H) 12 - 150 ng/mL Final       I reviewed the efficacy and compliance report from her device. Data summarized on the HPI and the PAP compliance flow sheet.     Patient verbalized understanding of these issues, agrees with the plan and all questions were answered today. Patient was given an opportuntity to voice any other symptoms or concerns not listed above. Patient did not have any other symptoms or concerns.      Jas Lyon DO  Board Certified in Internal Medicine and Sleep Medicine    (Note created with Dragon voice recognition and unintended spelling errors and word substitutions may occur)     Audio and visual devices were used for this virtual clinic visit with permission from patient.

## 2022-06-21 NOTE — NURSING NOTE
Return in about 2 year reminder created and to be send via [employer].       [place] JOSH  [facility]

## 2022-06-22 ENCOUNTER — HOSPITAL ENCOUNTER (EMERGENCY)
Facility: HOSPITAL | Age: 32
Discharge: HOME | End: 2022-06-22
Attending: STUDENT IN AN ORGANIZED HEALTH CARE EDUCATION/TRAINING PROGRAM | Admitting: STUDENT IN AN ORGANIZED HEALTH CARE EDUCATION/TRAINING PROGRAM
Payer: MEDICARE

## 2022-06-22 ENCOUNTER — PATIENT OUTREACH (OUTPATIENT)
Dept: FAMILY MEDICINE | Facility: CLINIC | Age: 32
End: 2022-06-22

## 2022-06-22 ENCOUNTER — APPOINTMENT (OUTPATIENT)
Dept: CT IMAGING | Facility: HOSPITAL | Age: 32
End: 2022-06-22
Attending: STUDENT IN AN ORGANIZED HEALTH CARE EDUCATION/TRAINING PROGRAM
Payer: MEDICARE

## 2022-06-22 VITALS
WEIGHT: 155 LBS | RESPIRATION RATE: 22 BRPM | DIASTOLIC BLOOD PRESSURE: 80 MMHG | TEMPERATURE: 98.2 F | SYSTOLIC BLOOD PRESSURE: 113 MMHG | HEART RATE: 71 BPM | HEIGHT: 60 IN | OXYGEN SATURATION: 100 % | BODY MASS INDEX: 30.43 KG/M2

## 2022-06-22 DIAGNOSIS — R10.12 ABDOMINAL PAIN, LEFT UPPER QUADRANT: ICD-10-CM

## 2022-06-22 LAB
ALBUMIN SERPL-MCNC: 4.4 G/DL (ref 3.5–5)
ALBUMIN UR-MCNC: NEGATIVE MG/DL
ALP SERPL-CCNC: 110 U/L (ref 45–120)
ALT SERPL W P-5'-P-CCNC: 10 U/L (ref 0–45)
ANION GAP SERPL CALCULATED.3IONS-SCNC: 8 MMOL/L (ref 5–18)
APPEARANCE UR: CLEAR
AST SERPL W P-5'-P-CCNC: 16 U/L (ref 0–40)
BASOPHILS # BLD AUTO: 0 10E3/UL (ref 0–0.2)
BASOPHILS NFR BLD AUTO: 1 %
BILIRUB SERPL-MCNC: 0.9 MG/DL (ref 0–1)
BILIRUB UR QL STRIP: NEGATIVE
BUN SERPL-MCNC: 24 MG/DL (ref 8–22)
CALCIUM SERPL-MCNC: 10.8 MG/DL (ref 8.5–10.5)
CHLORIDE BLD-SCNC: 109 MMOL/L (ref 98–107)
CO2 SERPL-SCNC: 22 MMOL/L (ref 22–31)
COLOR UR AUTO: COLORLESS
CREAT SERPL-MCNC: 0.86 MG/DL (ref 0.6–1.1)
EOSINOPHIL # BLD AUTO: 0.1 10E3/UL (ref 0–0.7)
EOSINOPHIL NFR BLD AUTO: 1 %
ERYTHROCYTE [DISTWIDTH] IN BLOOD BY AUTOMATED COUNT: 13.4 % (ref 10–15)
GFR SERPL CREATININE-BSD FRML MDRD: >90 ML/MIN/1.73M2
GLUCOSE BLD-MCNC: 85 MG/DL (ref 70–125)
GLUCOSE UR STRIP-MCNC: NEGATIVE MG/DL
HCG SERPL QL: NEGATIVE
HCT VFR BLD AUTO: 40.6 % (ref 35–47)
HGB BLD-MCNC: 12.3 G/DL (ref 11.7–15.7)
HGB UR QL STRIP: NEGATIVE
IMM GRANULOCYTES # BLD: 0 10E3/UL
IMM GRANULOCYTES NFR BLD: 1 %
KETONES UR STRIP-MCNC: NEGATIVE MG/DL
LEUKOCYTE ESTERASE UR QL STRIP: NEGATIVE
LIPASE SERPL-CCNC: 39 U/L (ref 0–52)
LYMPHOCYTES # BLD AUTO: 1 10E3/UL (ref 0.8–5.3)
LYMPHOCYTES NFR BLD AUTO: 17 %
MCH RBC QN AUTO: 28.9 PG (ref 26.5–33)
MCHC RBC AUTO-ENTMCNC: 30.3 G/DL (ref 31.5–36.5)
MCV RBC AUTO: 95 FL (ref 78–100)
MONOCYTES # BLD AUTO: 0.4 10E3/UL (ref 0–1.3)
MONOCYTES NFR BLD AUTO: 6 %
NEUTROPHILS # BLD AUTO: 4.6 10E3/UL (ref 1.6–8.3)
NEUTROPHILS NFR BLD AUTO: 74 %
NITRATE UR QL: NEGATIVE
NRBC # BLD AUTO: 0 10E3/UL
NRBC BLD AUTO-RTO: 0 /100
PH UR STRIP: 6 [PH] (ref 5–7)
PLATELET # BLD AUTO: 240 10E3/UL (ref 150–450)
POTASSIUM BLD-SCNC: 4.4 MMOL/L (ref 3.5–5)
PROT SERPL-MCNC: 8 G/DL (ref 6–8)
RBC # BLD AUTO: 4.26 10E6/UL (ref 3.8–5.2)
RBC URINE: <1 /HPF
SODIUM SERPL-SCNC: 139 MMOL/L (ref 136–145)
SP GR UR STRIP: 1.01 (ref 1–1.03)
SQUAMOUS EPITHELIAL: <1 /HPF
UROBILINOGEN UR STRIP-MCNC: <2 MG/DL
WBC # BLD AUTO: 6.1 10E3/UL (ref 4–11)
WBC URINE: 0 /HPF

## 2022-06-22 PROCEDURE — 36415 COLL VENOUS BLD VENIPUNCTURE: CPT | Performed by: STUDENT IN AN ORGANIZED HEALTH CARE EDUCATION/TRAINING PROGRAM

## 2022-06-22 PROCEDURE — 83690 ASSAY OF LIPASE: CPT | Performed by: STUDENT IN AN ORGANIZED HEALTH CARE EDUCATION/TRAINING PROGRAM

## 2022-06-22 PROCEDURE — G1010 CDSM STANSON: HCPCS

## 2022-06-22 PROCEDURE — 250N000011 HC RX IP 250 OP 636: Performed by: STUDENT IN AN ORGANIZED HEALTH CARE EDUCATION/TRAINING PROGRAM

## 2022-06-22 PROCEDURE — 96375 TX/PRO/DX INJ NEW DRUG ADDON: CPT

## 2022-06-22 PROCEDURE — 84703 CHORIONIC GONADOTROPIN ASSAY: CPT | Performed by: STUDENT IN AN ORGANIZED HEALTH CARE EDUCATION/TRAINING PROGRAM

## 2022-06-22 PROCEDURE — 80053 COMPREHEN METABOLIC PANEL: CPT | Performed by: STUDENT IN AN ORGANIZED HEALTH CARE EDUCATION/TRAINING PROGRAM

## 2022-06-22 PROCEDURE — 99285 EMERGENCY DEPT VISIT HI MDM: CPT | Mod: 25

## 2022-06-22 PROCEDURE — 81001 URINALYSIS AUTO W/SCOPE: CPT | Performed by: STUDENT IN AN ORGANIZED HEALTH CARE EDUCATION/TRAINING PROGRAM

## 2022-06-22 PROCEDURE — 96361 HYDRATE IV INFUSION ADD-ON: CPT

## 2022-06-22 PROCEDURE — 96374 THER/PROPH/DIAG INJ IV PUSH: CPT

## 2022-06-22 PROCEDURE — 258N000003 HC RX IP 258 OP 636: Performed by: STUDENT IN AN ORGANIZED HEALTH CARE EDUCATION/TRAINING PROGRAM

## 2022-06-22 PROCEDURE — 85004 AUTOMATED DIFF WBC COUNT: CPT | Performed by: STUDENT IN AN ORGANIZED HEALTH CARE EDUCATION/TRAINING PROGRAM

## 2022-06-22 PROCEDURE — 250N000013 HC RX MED GY IP 250 OP 250 PS 637: Performed by: STUDENT IN AN ORGANIZED HEALTH CARE EDUCATION/TRAINING PROGRAM

## 2022-06-22 RX ORDER — TACROLIMUS 1 MG/1
3 CAPSULE, GELATIN COATED ORAL ONCE
Status: DISCONTINUED | OUTPATIENT
Start: 2022-06-22 | End: 2022-06-22

## 2022-06-22 RX ORDER — TACROLIMUS 1 MG/1
3 CAPSULE ORAL ONCE
Status: DISCONTINUED | OUTPATIENT
Start: 2022-06-22 | End: 2022-06-22 | Stop reason: HOSPADM

## 2022-06-22 RX ORDER — ONDANSETRON 4 MG/1
4 TABLET, ORALLY DISINTEGRATING ORAL EVERY 8 HOURS PRN
Qty: 10 TABLET | Refills: 0 | Status: SHIPPED | OUTPATIENT
Start: 2022-06-22 | End: 2022-06-25

## 2022-06-22 RX ORDER — AZATHIOPRINE 50 MG/1
150 TABLET ORAL ONCE
Status: DISCONTINUED | OUTPATIENT
Start: 2022-06-22 | End: 2022-06-22 | Stop reason: HOSPADM

## 2022-06-22 RX ORDER — ACETAMINOPHEN 325 MG/1
650 TABLET ORAL ONCE
Status: COMPLETED | OUTPATIENT
Start: 2022-06-22 | End: 2022-06-22

## 2022-06-22 RX ORDER — ONDANSETRON 2 MG/ML
4 INJECTION INTRAMUSCULAR; INTRAVENOUS ONCE
Status: COMPLETED | OUTPATIENT
Start: 2022-06-22 | End: 2022-06-22

## 2022-06-22 RX ORDER — MORPHINE SULFATE 4 MG/ML
4 INJECTION, SOLUTION INTRAMUSCULAR; INTRAVENOUS ONCE
Status: COMPLETED | OUTPATIENT
Start: 2022-06-22 | End: 2022-06-22

## 2022-06-22 RX ADMIN — MORPHINE SULFATE 0.4 MG: 4 INJECTION INTRAVENOUS at 08:10

## 2022-06-22 RX ADMIN — ACETAMINOPHEN 650 MG: 325 TABLET, FILM COATED ORAL at 09:01

## 2022-06-22 RX ADMIN — ONDANSETRON 4 MG: 2 INJECTION INTRAMUSCULAR; INTRAVENOUS at 06:43

## 2022-06-22 RX ADMIN — SODIUM CHLORIDE 1000 ML: 9 INJECTION, SOLUTION INTRAVENOUS at 06:38

## 2022-06-22 NOTE — ED PROVIDER NOTES
EMERGENCY DEPARTMENT ENCOUNTER      NAME: Mona Wagner  AGE: 31 year old female  YOB: 1990  MRN: [id number]  EVALUATION DATE & TIME: 6/22/2022  6:05 AM    PCP: Macarena Bowen    ED PROVIDER: Martinez Oscar M.D.      Chief Complaint   Patient presents with     Abdominal Pain         FINAL IMPRESSION:  1. Abdominal pain, left upper quadrant          ED COURSE & MEDICAL DECISION MAKING:    Pertinent Labs & Imaging studies reviewed. (See chart for details)  31 year old female presents to the Emergency Department for evaluation of abdominal pain.  Patient does have a history of renal transplant but the pain is in the upper left quadrant.  Blood work is normal.  CT scan was normal and showed no abnormalities.  This is clearly located in the abdomen does not represent any sort of pulmonary problem.  We will discharge patient home have him follow-up with his primary doctor.    At the conclusion of the encounter I discussed the results of all of the tests and the disposition. The questions were answered. The patient or family acknowledged understanding and was agreeable with the care plan.           MEDICATIONS GIVEN IN THE EMERGENCY:  Medications   morphine (PF) injection 4 mg (0.4 mg Intravenous Given 6/22/22 0810)   ondansetron (ZOFRAN) injection 4 mg (4 mg Intravenous Given 6/22/22 0643)   0.9% sodium chloride BOLUS (0 mLs Intravenous Stopped 6/22/22 0830)   acetaminophen (TYLENOL) tablet 650 mg (650 mg Oral Given 6/22/22 0901)       NEW PRESCRIPTIONS STARTED AT TODAY'S ER VISIT  Discharge Medication List as of 6/22/2022 10:19 AM      START taking these medications    Details   ondansetron (ZOFRAN ODT) 4 MG ODT tab Take 1 tablet (4 mg) by mouth every 8 hours as needed for nausea, Disp-10 tablet, R-0, E-Prescribe                =================================================================    HPI    Patient information was obtained from: Patient        Mona Wagner is a 31 year old female with a  pertinent history of renal transplant who presents to this ED for evaluation of abdominal pain.  Patient states that the pain initially was epigastrium and location and felt like gas to her and was familiar.  She took simethicone which did not help.  Over the last day or so the pain is moved to her left upper quadrant and is worse when she stands up.  No shortness of breath.  She does states worsens somewhat with breathing.  No chest pain.  No fevers.  No urinary symptoms or blood in her urine.  She has no pain over her kidney.  She has been nauseated since 4 AM but has not vomited.  No diarrhea or constipation.      REVIEW OF SYSTEMS   Review of Systems   All other systems reviewed and are negative.       PAST MEDICAL HISTORY:  Past Medical History:   Diagnosis Date     Anemia in chronic kidney disease      History of bacterial endocarditis      History of blood transfusion      History of DVT (deep vein thrombosis) 2014     History of seizure 2014     History of subarachnoid hemorrhage      Hydronephrosis      Hyperprolactinemia (H)      Hypertension     resolved after transplant     Hypothyroidism      Kidney transplanted 08/03/2019    DCD DDKT. Induction with thymo 6mg/kg.     Orthostatic hypotension      FATOUMATA (obstructive sleep apnea)      Pyelonephritis of transplanted kidney 01/23/2020     Secondary renal hyperparathyroidism (H)      Urinary tract infection        PAST SURGICAL HISTORY:  Past Surgical History:   Procedure Laterality Date     BENCH KIDNEY N/A 8/3/2019    Procedure: BACKBENCH PREPARATION, ALLOGRAFT, KIDNEY;  Surgeon: Dorian Johnson MD;  Location: [facility] OR     COMBINED CYSTOSCOPY, RETROGRADES, EXCHANGE STENT URETER(S) Right 11/23/2020    Procedure: CYSTOSCOPY, CYSOTGRAM, WITH RETROGRADE PYELOGRAM, ureteroscopy,  AND URETERAL STENT;  Surgeon: Wally Britt MD;  Location: [facility] OR     COMBINED CYSTOSCOPY, RETROGRADES, URETEROSCOPY, LASER HOLMIUM LITHOTRIPSY URETER(S), INSERT STENT N/A  2019    Procedure: CYSTOURETEROSCOPY, WITH RETROGRADE PYELOGRAM of transplant kidney, STENT INSERTION, Laser on standby;  Surgeon: Wally rBitt MD;  Location: [facility]     CREATE FISTULA ARTERIOVENOUS UPPER EXTREMITY  2014    Procedure: CREATE FISTULA ARTERIOVENOUS UPPER EXTREMITY;  Left Wrist Arteriovenous Fistula Placement;  Surgeon: Shashi Castro MD;  Location: [facility] OR     CREATE FISTULA ARTERIOVENOUS UPPER EXTREMITY       CYSTOSCOPY, RETROGRADES, INSERT STENT URETER(S), COMBINED N/A 2020    Procedure: CYSTOSCOPY, WITH RETROGRADE PYELOGRAM, CYSTOGRAM AND URETERAL STENT INSERTION - TRANSPLANT KIDNEY;  Surgeon: Wally Britt MD;  Location[facility]     IR CEREBRAL ANGIOGRAM  2014     PERCUTANEOUS BIOPSY KIDNEY Right 2019    Procedure: Right Kidney Biopsy;  Surgeon: Deny Rios MD;  Location: [facility]     RASTA/DIALYSIS CATHETER  12/10/2013          TRANSPLANT KIDNEY RECIPIENT  DONOR N/A 8/3/2019    Procedure: TRANSPLANT, KIDNEY, RECIPIENT,  DONOR with Ureteral Stent Placement;  Surgeon: Dorian Johnson MD;  Location: [facility] OR           CURRENT MEDICATIONS:    ondansetron (ZOFRAN ODT) 4 MG ODT tab  acetaminophen (TYLENOL) 325 MG tablet  aspirin (ASA) 81 MG chewable tablet  atorvastatin (LIPITOR) 10 MG tablet  azaTHIOprine (IMURAN) 50 MG tablet  diphenhydrAMINE (BENADRYL) 25 MG capsule  EPINEPHrine (ANY BX GENERIC EQUIV) 0.3 MG/0.3ML injection 2-pack  famotidine (PEPCID) 20 MG tablet  fludrocortisone (FLORINEF) 0.1 MG tablet  guaiFENesin (MUCINEX) 600 MG 12 hr tablet  hydrOXYzine (ATARAX) 10 MG tablet  lactobacillus rhamnosus, GG, (CULTURELL) capsule  levothyroxine (SYNTHROID/LEVOTHROID) 25 MCG tablet  mycophenolic acid (GENERIC EQUIVALENT) 180 MG EC tablet  nirmatrelvir and ritonavir (PAXLOVID) therapy pack  norethindrone (MICRONOR) 0.35 MG tablet  predniSONE (DELTASONE) 10 MG tablet  Prenatal Vit-Fe Fumarate-FA (PRENATAL MULTIVITAMIN W/IRON) 27-0.8 MG  tablet  Prenatal Vit-Fe Fumarate-FA (PRENATAL VITAMINS) 28-0.8 MG TABS  senna-docusate (SENOKOT-S/PERICOLACE) 8.6-50 MG tablet  simethicone (MYLICON) 125 MG chewable tablet  sodium chloride (OCEAN) 0.65 % nasal spray  tacrolimus (GENERIC EQUIVALENT) 0.5 MG capsule  tacrolimus (GENERIC EQUIVALENT) 1 MG capsule        ALLERGIES:  Allergies   Allergen Reactions     Contrast Dye Rash     CT contrast allergy 12/14/19 rash over eyes. Need to have pre medication before a CT WITH CONTRAST      Lisinopril Swelling     angioedema     Nitrous Oxide Other (See Comments)     Sense of doom     Chlorhexidine Rash     Rash at site     Sulfa Drugs Rash     Muscle stiffness of neck     Dapsone      Methemoglobinemia     Furosemide Other (See Comments)     Skin flushing     Metrogel [Metronidazole]      Hives diffusely on body after vaginal administration.     Azithromycin Dizziness and Rash     Cefuroxime Rash       FAMILY HISTORY:  Family History   Problem Relation Age of Onset     Kidney Disease Mother      Kidney failure Mother      Coronary Artery Disease Father      Cerebrovascular Disease Father      Heart Disease Father      Sleep Apnea Father      Hypertension Sister      Diabetes Sister      Cerebrovascular Disease Sister      Kidney Disease Sister      Breast Cancer No family hx of      Cancer - colorectal No family hx of      Ovarian Cancer No family hx of      Prostate Cancer No family hx of      Other Cancer No family hx of      Asthma No family hx of      Anesthesia Reaction No family hx of      Deep Vein Thrombosis (DVT) No family hx of      Melanoma No family hx of      Skin Cancer No family hx of        SOCIAL HISTORY:   Social History     Socioeconomic History     Marital status: Single     Number of children: 0   Occupational History     Occupation: Pharmacy Technician   Tobacco Use     Smoking status: Never Smoker     Smokeless tobacco: Never Used   Vaping Use     Vaping Use: Never used   Substance and Sexual  Activity     Alcohol use: No     Alcohol/week: 0.0 standard drinks     Drug use: No     Sexual activity: Yes     Partners: Male   Social History Narrative    Date of Service:5/15/2013     Name: Mona VALDOVINOS (Month, Day, Year of birth): 1990     MRN: [id number]     New Patient: Yes    Preferred Language: English     Needed: No    County of Residence: [place]    Marital Status:     Household size: 8    Number of Dependents:0     Pregnant: 0    Average Monthly Income: $ 600    Citizenship Status: Citizen    Gave Pt [employer] and [employer] applications       VITALS:  /80   Pulse 71   Temp 98.2  F (36.8  C)   Resp 22   Ht 1.524 m (5')   Wt 70.3 kg (155 lb)   SpO2 100%   BMI 30.27 kg/m        PHYSICAL EXAM    Constitutional: Well developed, Well nourished, NAD, GCS 15  HENT: Normocephalic, Atraumatic, Bilateral external ears normal, Oropharynx normal, mucous membranes moist, Nose normal. Neck-  Normal range of motion, No tenderness, Supple, No stridor.  Eyes: PERRL, EOMI, Conjunctiva normal, No discharge.   Respiratory: Normal breath sounds, No respiratory distress, No wheezing, Speaks full sentences easily. No cough.  Cardiovascular: Normal heart rate, Regular rhythm, No murmurs, No rubs, No gallops. Chest wall nontender.  GI:Soft, mild tenderness left upper quadrant no masses, No flank tenderness. No rebound or guarding.   Musculoskeletal: 2+ DP pulses. No edema.No cyanosis, No clubbing. Good range of motion in all major joints. No tenderness to palpation or major deformities noted.   Integument: Warm, Dry, No erythema, No rash. No petechiae.   Neurologic: Alert & oriented x 3,  CN 3-12 intact Normal motor function, Normal sensory function, No focal deficits noted. Normal gait. Normal finger to nose bilaterally  Psychiatric: Affect normal, Judgment normal, Mood normal. Cooperative.          LAB:  All pertinent labs reviewed and interpreted.  Labs Ordered and Resulted from Time of ED  Arrival to Time of ED Departure   COMPREHENSIVE METABOLIC PANEL - Abnormal       Result Value    Sodium 139      Potassium 4.4      Chloride 109 (*)     Carbon Dioxide (CO2) 22      Anion Gap 8      Urea Nitrogen 24 (*)     Creatinine 0.86      Calcium 10.8 (*)     Glucose 85      Alkaline Phosphatase 110      AST 16      ALT 10      Protein Total 8.0      Albumin 4.4      Bilirubin Total 0.9      GFR Estimate >90     CBC WITH PLATELETS AND DIFFERENTIAL - Abnormal    WBC Count 6.1      RBC Count 4.26      Hemoglobin 12.3      Hematocrit 40.6      MCV 95      MCH 28.9      MCHC 30.3 (*)     RDW 13.4      Platelet Count 240      % Neutrophils 74      % Lymphocytes 17      % Monocytes 6      % Eosinophils 1      % Basophils 1      % Immature Granulocytes 1      NRBCs per 100 WBC 0      Absolute Neutrophils 4.6      Absolute Lymphocytes 1.0      Absolute Monocytes 0.4      Absolute Eosinophils 0.1      Absolute Basophils 0.0      Absolute Immature Granulocytes 0.0      Absolute NRBCs 0.0     LIPASE - Normal    Lipase 39     HCG QUALITATIVE PREGNANCY - Normal    hCG Serum Qualitative Negative     ROUTINE UA WITH MICROSCOPIC REFLEX TO CULTURE - Normal    Color Urine Colorless      Appearance Urine Clear      Glucose Urine Negative      Bilirubin Urine Negative      Ketones Urine Negative      Specific Gravity Urine 1.013      Blood Urine Negative      pH Urine 6.0      Protein Albumin Urine Negative      Urobilinogen Urine <2.0      Nitrite Urine Negative      Leukocyte Esterase Urine Negative      RBC Urine <1      WBC Urine 0      Squamous Epithelials Urine <1         RADIOLOGY:  Reviewed all pertinent imaging. Please see official radiology report.  CT Abdomen Pelvis w/o Contrast   Final Result   IMPRESSION:    1.  No left upper quadrant pain etiology found.   2.  Atrophic native kidneys.   3.  Right renal fossa transplant with stable hydronephrosis.   4.  Renal transplant 1 mm nonobstructive stone.                    Martinez Oscar M.D.  Emergency Medicine  [facility] EMERGENCY DEPARTMENT  [facility] [facility] [id number] [phone]  Dept: [phone]     Martinez Oscar MD  06/22/22 3585

## 2022-06-22 NOTE — DISCHARGE INSTRUCTIONS
Please continue to take simethicone and tylenol and follow up with primary.  Return should pain worsen or any shortness of breath

## 2022-06-22 NOTE — ED TRIAGE NOTES
Patient was dropped off. Complains of abdominal pain with nausea and no vomiting. Hx of kidney transplant.

## 2022-06-22 NOTE — PROGRESS NOTES
Clinic Care Coordination Contact    Follow Up Progress Note      Assessment: The pt was recently in the ED, I called to check up on the pt, and help the pt setup a ED follow up. The pt was at [facility] for abdomin pain. I called and talked to the pt, pt stated that she is doing ok. Pt stated that she wanted to make a follow up. I was able to help setup for the pt to come in on 06/24/2022 at 2:20pm with .     Care Gaps:    Health Maintenance Due   Topic Date Due     ADVANCE CARE PLANNING  Never done     MEDICARE ANNUAL WELLNESS VISIT  03/13/2021           Goals addressed this encounter:    Goals Addressed    None         Intervention/Education provided during outreach:               Plan:     Care Coordinator will follow up in month

## 2022-06-22 NOTE — ED NOTES
"Discussed discharge instructions with patient. Patient agreeable with plan but now requesting rx for zofran. Denies nausea but wants rx \"just in case\". Dr. Oscar updated.  "

## 2022-06-24 ENCOUNTER — OFFICE VISIT (OUTPATIENT)
Dept: FAMILY MEDICINE | Facility: CLINIC | Age: 32
End: 2022-06-24
Payer: MEDICARE

## 2022-06-24 ENCOUNTER — TELEPHONE (OUTPATIENT)
Dept: FAMILY MEDICINE | Facility: CLINIC | Age: 32
End: 2022-06-24

## 2022-06-24 VITALS
BODY MASS INDEX: 29.88 KG/M2 | SYSTOLIC BLOOD PRESSURE: 112 MMHG | WEIGHT: 153 LBS | TEMPERATURE: 99.1 F | RESPIRATION RATE: 20 BRPM | HEART RATE: 76 BPM | DIASTOLIC BLOOD PRESSURE: 71 MMHG | OXYGEN SATURATION: 98 %

## 2022-06-24 DIAGNOSIS — R12 HEARTBURN: Primary | ICD-10-CM

## 2022-06-24 DIAGNOSIS — K59.01 SLOW TRANSIT CONSTIPATION: ICD-10-CM

## 2022-06-24 DIAGNOSIS — Z94.0 KIDNEY TRANSPLANTED: ICD-10-CM

## 2022-06-24 DIAGNOSIS — M79.18 MUSCULOSKELETAL PAIN: ICD-10-CM

## 2022-06-24 DIAGNOSIS — Z91.89 AT INCREASED RISK FOR EMERGENCY HOSPITAL ADMISSION: ICD-10-CM

## 2022-06-24 LAB
ANION GAP SERPL CALCULATED.3IONS-SCNC: 6 MMOL/L (ref 3–14)
BUN SERPL-MCNC: 15 MG/DL (ref 7–30)
CALCIUM SERPL-MCNC: 10.7 MG/DL (ref 8.5–10.1)
CHLORIDE BLD-SCNC: 109 MMOL/L (ref 94–109)
CO2 SERPL-SCNC: 25 MMOL/L (ref 20–32)
CREAT SERPL-MCNC: 1 MG/DL (ref 0.52–1.04)
GFR SERPL CREATININE-BSD FRML MDRD: 78 ML/MIN/1.73M2
GLUCOSE BLD-MCNC: 101 MG/DL (ref 70–99)
POTASSIUM BLD-SCNC: 4.5 MMOL/L (ref 3.4–5.3)
SODIUM SERPL-SCNC: 140 MMOL/L (ref 133–144)

## 2022-06-24 PROCEDURE — 99214 OFFICE O/P EST MOD 30 MIN: CPT | Mod: GC | Performed by: STUDENT IN AN ORGANIZED HEALTH CARE EDUCATION/TRAINING PROGRAM

## 2022-06-24 PROCEDURE — 36415 COLL VENOUS BLD VENIPUNCTURE: CPT | Performed by: STUDENT IN AN ORGANIZED HEALTH CARE EDUCATION/TRAINING PROGRAM

## 2022-06-24 PROCEDURE — 80048 BASIC METABOLIC PNL TOTAL CA: CPT | Performed by: STUDENT IN AN ORGANIZED HEALTH CARE EDUCATION/TRAINING PROGRAM

## 2022-06-24 RX ORDER — POLYETHYLENE GLYCOL 3350 17 G/17G
1 POWDER, FOR SOLUTION ORAL DAILY PRN
Qty: 507 G | Refills: 3 | Status: SHIPPED | OUTPATIENT
Start: 2022-06-24 | End: 2023-11-27

## 2022-06-24 RX ORDER — CYCLOBENZAPRINE HCL 5 MG
5 TABLET ORAL 3 TIMES DAILY PRN
Qty: 12 TABLET | Refills: 0 | Status: SHIPPED | OUTPATIENT
Start: 2022-06-24 | End: 2023-01-16

## 2022-06-24 RX ORDER — SENNOSIDES 8.6 MG
1 TABLET ORAL DAILY
Qty: 90 TABLET | Refills: 3 | Status: SHIPPED | OUTPATIENT
Start: 2022-06-24 | End: 2023-01-16

## 2022-06-24 NOTE — LETTER
[facility] [address]  [place] [address]  Phone: [phone]  Fax: [phone]    June 24, 2022        Mona Wagner  [address]   [place] [address]          To whom it may concern:    RE: Mona Wagner    Patient was seen and treated today at our clinic and missed work. Please excuse from work on 6/23-6/24 due to illness. Mona is cleared to return to work without restriction starting on 6/25.    Please contact me for questions or concerns.      Sincerely,        Tracey Mosqueda MD

## 2022-06-24 NOTE — PROGRESS NOTES
I have personally reviewed the history and examination as documented by Dr. Mosqueda.  I was present during key portions of the visit and agree with the assessment and plan as documented for 31 yr old female w/ hx of kidney transplant with recent ED visit for abdominal pain here for follow-up. Abdominal CT on 6/22/22 was reassuring. Will tx w/ bowel regimen, H2 blocker. Precautions given. Anticipatory guidance given.       Brian Kohli MD  June 24, 2022  2:56 PM

## 2022-06-24 NOTE — PATIENT INSTRUCTIONS
Please try taking pepcid twice daily for the next few days and follow up if symptoms are worse.    Take flexril at night to see if it helps the pain. You could also try heat/ice to the area.

## 2022-06-24 NOTE — PROGRESS NOTES
Assessment & Plan     Heartburn  Hypercalcemia remains present, though slightly improved.  We will continue to follow though this is a stable condition for her.  Kidney function remains normal.  Question if this is heartburn versus a muscle strain.  Very low suspicion at this time that this is lung or cardiac related as her pain is located in the left upper quadrant of her abdomen.  Vitally normal on exam which is reassuring.  CT abdomen pelvis showed no abnormalities 2 days ago, this is also reassuring.  Continues to deny dysuria.  Having regular bowel movements with laxatives, will send prescription.  Counseled on resuming as needed Pepcid for the next few days to see if this also helps the pain.  - Basic metabolic panel  - Basic metabolic panel  - polyethylene glycol (MIRALAX) 17 GM/Dose powder  Dispense: 507 g; Refill: 3  - sennosides (SENOKOT) 8.6 MG tablet  Dispense: 90 tablet; Refill: 3    At increased risk for emergency hospital admission  Kidney transplanted  Patient does have complex medical history with a kidney transplant.  Transplanted kidney did look well-appearing on CT in the ED on 6/22.  Kidney function on BMP today is within normal limits is also reassuring.      Musculoskeletal pain  Question musculoskeletal pain in the setting of very focal pain with some tenderness to palpation in the left upper quadrant.  States pain is worse with ambulation which makes raises question of musculoskeletal pain.  Pain is also slightly pleuritic, but is too low to be concerning for pulmonary embolism.  Pain also has migrated from epigastrium and right upper quadrant to now the left upper quadrant.  Again high suspicion for reflux with compounding constipation, but will trial small dose of Flexeril to see if this alleviates the pain.  Counseled on sedating effects of Flexeril, will follow-up closely.  - cyclobenzaprine (FLEXERIL) 5 MG tablet  Dispense: 12 tablet; Refill: 0    Slow transit constipation  Requesting  prescriptions for laxatives that this has helped some.  Refills given.  - polyethylene glycol (MIRALAX) 17 GM/Dose powder  Dispense: 507 g; Refill: 3  - sennosides (SENOKOT) 8.6 MG tablet  Dispense: 90 tablet; Refill: 3    Follow-up in 3 to 4 days if symptoms are not improving.    Precepted with Dr. Kohli.    Tracey Mosqueda MD  [facility]    Elsa Dunn is a 31 year old presenting for the following health issues:  ER F/U      HPI       Epigastric pain, now left upper quadrant abdominal pain  Presented to ED on 6/22 for pain in epigastrium.  Pain mainly LUQ, mainly feels like gas pain. Did start in the epigastrium and move to the RUQ initially, now LUQ.  Previously constipated very small scant amounts of stool for the last several days during bowel movement. Has had BM today; fairly normal--no blood, no melena.  Senna and Miralax--twice per day since the ED visit.    Sharp pain, fairly constant   Worse with walking  Worse with deep breaths.    No vomiting; no nausea.    CMP wnl, mild hypercalcemia to 10.8.  UA wnl.  CT abd wnl, no abnormalities found, transplanted kidney well appearing.  No signs of nephrolithiasis.    Has been taking tylenol q6h, does help the pain when needed.    Fell at work in May on her L side.  Was feeling fine after that trauma.  Feels it gets worse with food. Eating smaller meals  Feels hydrated, has been drinking water regularly.    No pain with urination.    Review of Systems   Constitutional, HEENT, cardiovascular, pulmonary, gi and gu systems are negative, except as otherwise noted.        Objective    /71   Pulse 76   Temp 99.1  F (37.3  C)   Resp 20   Wt 69.4 kg (153 lb)   SpO2 98%   BMI 29.88 kg/m    Body mass index is 29.88 kg/m .  Physical Exam   GENERAL: healthy, alert and no distress  RESP: Normal work of breathing, no audible wheezes, speaking in full sentences without difficulty  CV: regular rate and rhythm, normal S1 S2, no S3 or  S4, no murmur, click or rub, no peripheral edema and peripheral pulses strong  ABDOMEN: soft, tender to palpation in the left upper quadrant very focally, no pain to palpation in epigastrium or other abdominal quadrants, no hepatosplenomegaly, no masses and bowel sounds normal  MS: no gross musculoskeletal defects noted, no edema    Results for orders placed or performed in visit on 06/24/22   Basic metabolic panel     Status: Abnormal   Result Value Ref Range    Sodium 140 133 - 144 mmol/L    Potassium 4.5 3.4 - 5.3 mmol/L    Chloride 109 94 - 109 mmol/L    Carbon Dioxide (CO2) 25 20 - 32 mmol/L    Anion Gap 6 3 - 14 mmol/L    Urea Nitrogen 15 7 - 30 mg/dL    Creatinine 1.00 0.52 - 1.04 mg/dL    Calcium 10.7 (H) 8.5 - 10.1 mg/dL    Glucose 101 (H) 70 - 99 mg/dL    GFR Estimate 78 >60 mL/min/1.73m2       ----- Service Performed and Documented by Resident or Fellow ------

## 2022-06-24 NOTE — LETTER
[facility] [address]  [place] [address]  Phone: [phone]  Fax: [phone]    June 24, 2022        Mona Wagner  [address]   [place] [address]          To whom it may concern:    RE: Mona Wagner      Patient was seen and treated today at our clinic and missed work. Please excuse from work on 6/23-6/24 due to illness. Mona is cleared to return to work with a 5 pound lifting restriction starting on 6/25.    Please contact me for questions or concerns.      Sincerely,        Tracey Mosqueda MD

## 2022-06-24 NOTE — TELEPHONE ENCOUNTER
PRIOR AUTHORIZATION DENIED    Medication: polyethylene glycol (MIRALAX) 17 GM/Dose powder - EPA DENIED    Denial Date: 6/24/2022    Denial Rational: OTC MEDS NOT COVERED BY INS    Appeal Information: N/A

## 2022-06-26 ENCOUNTER — HEALTH MAINTENANCE LETTER (OUTPATIENT)
Age: 32
End: 2022-06-26

## 2022-07-06 ENCOUNTER — HOSPITAL ENCOUNTER (OUTPATIENT)
Dept: ULTRASOUND IMAGING | Facility: HOSPITAL | Age: 32
Discharge: HOME | End: 2022-07-06
Admitting: DERMATOLOGY
Payer: MEDICARE

## 2022-07-06 DIAGNOSIS — R22.31 NODULE OF SKIN OF RIGHT UPPER EXTREMITY: ICD-10-CM

## 2022-07-06 PROCEDURE — 76882 US LMTD JT/FCL EVL NVASC XTR: CPT | Mod: RT

## 2022-07-11 ENCOUNTER — LAB (OUTPATIENT)
Dept: LAB | Facility: HOSPITAL | Age: 32
End: 2022-07-11
Payer: MEDICARE

## 2022-07-11 DIAGNOSIS — Z79.899 ENCOUNTER FOR LONG-TERM CURRENT USE OF MEDICATION: ICD-10-CM

## 2022-07-11 DIAGNOSIS — N39.0 URINARY TRACT INFECTION: ICD-10-CM

## 2022-07-11 DIAGNOSIS — Z48.298 AFTERCARE FOLLOWING ORGAN TRANSPLANT: ICD-10-CM

## 2022-07-11 DIAGNOSIS — Z94.0 KIDNEY REPLACED BY TRANSPLANT: ICD-10-CM

## 2022-07-11 LAB
ALBUMIN MFR UR ELPH: <7 MG/DL
ALBUMIN UR-MCNC: NEGATIVE MG/DL
ANION GAP SERPL CALCULATED.3IONS-SCNC: 4 MMOL/L (ref 5–18)
APPEARANCE UR: CLEAR
BILIRUB UR QL STRIP: NEGATIVE
BUN SERPL-MCNC: 15 MG/DL (ref 8–22)
CALCIUM SERPL-MCNC: 10 MG/DL (ref 8.5–10.5)
CHLORIDE BLD-SCNC: 108 MMOL/L (ref 98–107)
CO2 SERPL-SCNC: 25 MMOL/L (ref 22–31)
COLOR UR AUTO: ABNORMAL
CREAT SERPL-MCNC: 0.86 MG/DL (ref 0.6–1.1)
CREAT UR-MCNC: 74 MG/DL
ERYTHROCYTE [DISTWIDTH] IN BLOOD BY AUTOMATED COUNT: 14 % (ref 10–15)
GFR SERPL CREATININE-BSD FRML MDRD: >90 ML/MIN/1.73M2
GLUCOSE BLD-MCNC: 92 MG/DL (ref 70–125)
GLUCOSE UR STRIP-MCNC: NEGATIVE MG/DL
HCT VFR BLD AUTO: 38.4 % (ref 35–47)
HGB BLD-MCNC: 11.9 G/DL (ref 11.7–15.7)
HGB UR QL STRIP: NEGATIVE
KETONES UR STRIP-MCNC: NEGATIVE MG/DL
LEUKOCYTE ESTERASE UR QL STRIP: NEGATIVE
MCH RBC QN AUTO: 29.5 PG (ref 26.5–33)
MCHC RBC AUTO-ENTMCNC: 31 G/DL (ref 31.5–36.5)
MCV RBC AUTO: 95 FL (ref 78–100)
MUCOUS THREADS #/AREA URNS LPF: PRESENT /LPF
NITRATE UR QL: NEGATIVE
PH UR STRIP: 5.5 [PH] (ref 5–7)
PLATELET # BLD AUTO: 211 10E3/UL (ref 150–450)
POTASSIUM BLD-SCNC: 4.4 MMOL/L (ref 3.5–5)
PROT/CREAT 24H UR: NORMAL MG/G{CREAT}
RBC # BLD AUTO: 4.04 10E6/UL (ref 3.8–5.2)
RBC URINE: 1 /HPF
SODIUM SERPL-SCNC: 137 MMOL/L (ref 136–145)
SP GR UR STRIP: 1.01 (ref 1–1.03)
SQUAMOUS EPITHELIAL: <1 /HPF
TACROLIMUS BLD-MCNC: 5.1 UG/L (ref 5–15)
TME LAST DOSE: NORMAL H
TME LAST DOSE: NORMAL H
TRANSITIONAL EPI: <1 /HPF
UROBILINOGEN UR STRIP-MCNC: <2 MG/DL
WBC # BLD AUTO: 5.3 10E3/UL (ref 4–11)
WBC URINE: 1 /HPF

## 2022-07-11 PROCEDURE — 80197 ASSAY OF TACROLIMUS: CPT

## 2022-07-11 PROCEDURE — 36415 COLL VENOUS BLD VENIPUNCTURE: CPT

## 2022-07-11 PROCEDURE — 80048 BASIC METABOLIC PNL TOTAL CA: CPT

## 2022-07-11 PROCEDURE — 81001 URINALYSIS AUTO W/SCOPE: CPT

## 2022-07-11 PROCEDURE — 85027 COMPLETE CBC AUTOMATED: CPT

## 2022-07-11 PROCEDURE — 84156 ASSAY OF PROTEIN URINE: CPT

## 2022-07-13 ENCOUNTER — LAB (OUTPATIENT)
Dept: LAB | Facility: CLINIC | Age: 32
End: 2022-07-13
Payer: MEDICARE

## 2022-07-13 ENCOUNTER — OFFICE VISIT (OUTPATIENT)
Dept: DERMATOLOGY | Facility: CLINIC | Age: 32
End: 2022-07-13

## 2022-07-13 DIAGNOSIS — R22.41 NODULE OF SKIN OF RIGHT LOWER EXTREMITY: ICD-10-CM

## 2022-07-13 DIAGNOSIS — L65.0 TELOGEN EFFLUVIUM: ICD-10-CM

## 2022-07-13 DIAGNOSIS — L65.9 NON-SCARRING ALOPECIA: ICD-10-CM

## 2022-07-13 DIAGNOSIS — R63.39 OTHER FEEDING DIFFICULTIES: ICD-10-CM

## 2022-07-13 DIAGNOSIS — E60 ZINC DEFICIENCY: ICD-10-CM

## 2022-07-13 DIAGNOSIS — E83.52 HYPERCALCEMIA: ICD-10-CM

## 2022-07-13 DIAGNOSIS — E06.3 HYPOTHYROIDISM DUE TO HASHIMOTO'S THYROIDITIS: ICD-10-CM

## 2022-07-13 DIAGNOSIS — L65.0 TELOGEN EFFLUVIUM: Primary | ICD-10-CM

## 2022-07-13 LAB
DEPRECATED CALCIDIOL+CALCIFEROL SERPL-MC: 14 UG/L (ref 20–75)
FERRITIN SERPL-MCNC: 1111 NG/ML (ref 12–150)
IRON SATN MFR SERPL: 24 % (ref 15–46)
IRON SERPL-MCNC: 51 UG/DL (ref 35–180)
PROLACTIN SERPL 3RD IS-MCNC: 17 NG/ML (ref 5–23)
THYROPEROXIDASE AB SERPL-ACNC: <10 IU/ML
TIBC SERPL-MCNC: 217 UG/DL (ref 240–430)
TSH SERPL DL<=0.005 MIU/L-ACNC: 1.74 MU/L (ref 0.4–4)

## 2022-07-13 PROCEDURE — 84630 ASSAY OF ZINC: CPT | Mod: 90 | Performed by: PATHOLOGY

## 2022-07-13 PROCEDURE — 84443 ASSAY THYROID STIM HORMONE: CPT | Performed by: PATHOLOGY

## 2022-07-13 PROCEDURE — 86376 MICROSOMAL ANTIBODY EACH: CPT | Performed by: DERMATOLOGY

## 2022-07-13 PROCEDURE — 36415 COLL VENOUS BLD VENIPUNCTURE: CPT | Performed by: PATHOLOGY

## 2022-07-13 PROCEDURE — 99000 SPECIMEN HANDLING OFFICE-LAB: CPT | Performed by: PATHOLOGY

## 2022-07-13 PROCEDURE — 83550 IRON BINDING TEST: CPT | Performed by: PATHOLOGY

## 2022-07-13 PROCEDURE — 99214 OFFICE O/P EST MOD 30 MIN: CPT | Mod: GC | Performed by: DERMATOLOGY

## 2022-07-13 PROCEDURE — 84146 ASSAY OF PROLACTIN: CPT | Performed by: INTERNAL MEDICINE

## 2022-07-13 PROCEDURE — 82306 VITAMIN D 25 HYDROXY: CPT | Performed by: DERMATOLOGY

## 2022-07-13 PROCEDURE — 82728 ASSAY OF FERRITIN: CPT | Performed by: PATHOLOGY

## 2022-07-13 ASSESSMENT — PAIN SCALES - GENERAL: PAINLEVEL: NO PAIN (0)

## 2022-07-13 NOTE — LETTER
7/13/2022     RE: Mona Wagner  [address]  [place] [address]     Dear Colleague,    Thank you for referring your patient, Mona Wagner, to the [facility]ERMATOLOGY [facility] [place] [facility]. Please see a copy of my visit note below.    [facility] Dermatology Note  Encounter Date: Jul 13, 2022  Office Visit     Dermatology Problem List:  1. Keratosis Pilaris  - Past tx: Amlactin  2. Renal transplant for IgA nephropathy in 8/2019 c/b hydronephrosis.   - currently: myfortic and tacrolimus  - annual skin check. Last: 6/8/2022  3. Nodule in right axillary vault  - ordered US  4. Milial cyst in left labia majora  5. Non-scarring hair loss  - Start Rogaine 7/13/22   ____________________________________________    Assessment & Plan:    # Renal transplant for IgA nephropathy in 8/2019 c/b hydronephrosis.   - currently: myfortic and tacrolimus  - annual skin check. Last: 6/8/2022  - recommended SPF 30 or above    # Non-scarring hair loss. Chronic, flare.   Consistent with telogen effluvium, likely due to recent COVID infection. Will do lab testing to rule out other causes. CBC, BMP WNL 7/11/22.  - labwork ordered today: iron panel, ferritin, TSH, vitamin D, and TPO antibody   - start Rogaine 5% foam once daily    # Nodule in right axillary vault  US 7/6/22 showed indeterminate but likely benign 1.4 cm solid nodule in right axillary region, consistent with lymph not with prominent fatty hilum. More rubbery on exam today. Could be lipoma, but recommended excisional biopsy for further evaluation if it is a solitary enlarged lymph node.   - referral to general surgery for excisional biopsy    Follow-up: 6 month(s) in-person, or earlier for new or changing lesions    Staff, Resident and Scribe:     Scribe Disclosure:  Inez HAYNES, am serving as a scribe to document services personally performed by Baudilio Arellano MD based on  data collection and the provider's statements to me.     Katrin Robles MD, PGY-3  [facility]  July 13, 2022    Provider Disclosure:   The documentation recorded by the scribe accurately reflects the services I personally performed and the decisions made by me.    Staff Physician Comments:   I saw and evaluated the patient with the resident and I agree with the assessment and plan.  I was present for the examination.    Baudilio Arellano MD  Pronouns: he/him/his    Department of Dermatology  [facility]: Phone: [phone], Fax:[phone]  [facility]: Phone: [phone] Fax: [phone]  ____________________________________________    CC: Hair Loss (Mona is here for hair loss workup, reports no changes since her last visit)    HPI:  Ms. Mona Wagner is a(n) 31 year old female who presents today as a return patient for hair loss. Last seen by myself on 6/8/22 at which point an US was ordered of a nodule in the R axilla that returned benign (suspected lymph node with prominent fatty hilum). She otherwise had a benign skin exam.     She reports she has noticed hair shedding after her transplant 08/2019. Noticed that she has had more hair loss since 05/2022 when she had COVID. COVID illness was mild. Positive for 18 days with home tests, but symptoms were mild with stuffy nose and sore throat. Notices it the most when she brushes her hair. Noticing the most thinness of hair on the top and on the sides, maybe in the back but she cannot tell. She does not have any patches of hair loss that she is aware of.     Has been using Atomy Saengmodan since May 2021 for hair thinning until Jan 2022, thinks it has helped. Recently restarted last month. Wants to make sure it is safe.     Transplant doctors thought tacrolimus may have been making her hair shed, and were going to refer  her to a dermatologist. She was not worried about it initially, but now is more concerned because her hair is much thinner.     The bump in her right armpit has been there for 3 years. Has not changed in that time. Non-tender. She would prefer not to have a surgery if it is not needed. Did have a similar enlarged lymph node in her groin about 10 years ago, had an ultrasound that showed it was an enlarged lymph node. She can no longer feel it, and it was not removed.     Patient is otherwise feeling well, without additional skin concerns.    Labs Reviewed:  BMP 7/11/22 WNL  CBC 7/11/22 WNL, Hgb 11.9  TSH 2.49 11/11/21    Physical Exam:  Vitals: There were no vitals taken for this visit.  SKIN: Focused examination of right axilla and scalp was performed.  - approximately 1 cm non-tender rubbery-textured nodule in center of right axilla  - Hair pull test is positive.  There is decreased hair density on the crown and vertex scalp. Hair density is normal in occipital regions    - No other lesions of concern on areas examined.     Medications:  Current Outpatient Medications   Medication     acetaminophen (TYLENOL) 325 MG tablet     aspirin (ASA) 81 MG chewable tablet     atorvastatin (LIPITOR) 10 MG tablet     azaTHIOprine (IMURAN) 50 MG tablet     cyclobenzaprine (FLEXERIL) 5 MG tablet     diphenhydrAMINE (BENADRYL) 25 MG capsule     EPINEPHrine (ANY BX GENERIC EQUIV) 0.3 MG/0.3ML injection 2-pack     famotidine (PEPCID) 20 MG tablet     fludrocortisone (FLORINEF) 0.1 MG tablet     hydrOXYzine (ATARAX) 10 MG tablet     levothyroxine (SYNTHROID/LEVOTHROID) 25 MCG tablet     minoxidil (ROGAINE) 5 % external solution     norethindrone (MICRONOR) 0.35 MG tablet     polyethylene glycol (MIRALAX) 17 GM/Dose powder     Prenatal Vit-Fe Fumarate-FA (PRENATAL MULTIVITAMIN W/IRON) 27-0.8 MG tablet     Prenatal Vit-Fe Fumarate-FA (PRENATAL VITAMINS) 28-0.8 MG TABS     senna-docusate (SENOKOT-S/PERICOLACE) 8.6-50 MG tablet      sennosides (SENOKOT) 8.6 MG tablet     simethicone (MYLICON) 125 MG chewable tablet     sodium chloride (OCEAN) 0.65 % nasal spray     tacrolimus (GENERIC EQUIVALENT) 0.5 MG capsule     tacrolimus (GENERIC EQUIVALENT) 1 MG capsule     No current facility-administered medications for this visit.      Past Medical History:   Patient Active Problem List   Diagnosis     DVT of upper extremity (deep vein thrombosis) (H)     Seizure disorder (H)     Galactorrhea     Acquired hypothyroidism     Increased prolactin level     Hypomagnesemia     Infective endocarditis-history of.  1/2019.  resolved.      Aftercare following organ transplant     Immunosuppression (H)     Methemoglobinemia     Kidney replaced by transplant     Anxiety     Secondary renal hyperparathyroidism (H)     Vitamin D deficiency     CKD (chronic kidney disease) stage 3, GFR 30-59 ml/min (H)     Stricture or kinking of ureter     Pyelonephritis     Diarrhea     Bicornate uterus     Normal Pap 3/2020.  repeat 3/2023     Prophylactic antibiotic     Hydronephrosis     Hydronephrosis of kidney transplant     Ureter, stricture     Urinary tract infection     Past Medical History:   Diagnosis Date     Anemia in chronic kidney disease      History of bacterial endocarditis      History of blood transfusion      History of DVT (deep vein thrombosis) 2014     History of seizure 2014     History of subarachnoid hemorrhage      Hydronephrosis      Hyperprolactinemia (H)      Hypertension     resolved after transplant     Hypothyroidism      Kidney transplanted 08/03/2019    DCD DDKT. Induction with thymo 6mg/kg.     Orthostatic hypotension      FATOUMATA (obstructive sleep apnea)      Pyelonephritis of transplanted kidney 01/23/2020     Secondary renal hyperparathyroidism (H)      Urinary tract infection    CC No referring provider defined for this encounter. on close of this encounter.

## 2022-07-13 NOTE — PATIENT INSTRUCTIONS
Your hair loss is likely something called telogen effluvium, which could be related to having COVID  Get Rogaine Men's strength 5% product and use once a day. It comes in liquid or foam, and most people prefer foam  We will get a lab workup for other causes of hair loss  Follow up in 6 months

## 2022-07-13 NOTE — PROGRESS NOTES
[facility] Dermatology Note  Encounter Date: Jul 13, 2022  Office Visit     Dermatology Problem List:  1. Keratosis Pilaris  - Past tx: Amlactin  2. Renal transplant for IgA nephropathy in 8/2019 c/b hydronephrosis.   - currently: myfortic and tacrolimus  - annual skin check. Last: 6/8/2022  3. Nodule in right axillary vault  - ordered US  4. Milial cyst in left labia majora  5. Non-scarring hair loss  - Start Rogaine 7/13/22     ____________________________________________    Assessment & Plan:    # Renal transplant for IgA nephropathy in 8/2019 c/b hydronephrosis.   - currently: myfortic and tacrolimus  - annual skin check. Last: 6/8/2022  - recommended SPF 30 or above    # Non-scarring hair loss. Chronic, flare.   Consistent with telogen effluvium, likely due to recent COVID infection. Will do lab testing to rule out other causes. CBC, BMP WNL 7/11/22.  - labwork ordered today: iron panel, ferritin, TSH, vitamin D, and TPO antibody   - start Rogaine 5% foam once daily    # Nodule in right axillary vault  US 7/6/22 showed indeterminate but likely benign 1.4 cm solid nodule in right axillary region, consistent with lymph not with prominent fatty hilum. More rubbery on exam today. Could be lipoma, but recommended excisional biopsy for further evaluation if it is a solitary enlarged lymph node.   - referral to general surgery for excisional biopsy    Follow-up: 6 month(s) in-person, or earlier for new or changing lesions    Staff, Resident and Scribe:     Scribe Disclosure:  I, Inez Durbin, am serving as a scribe to document services personally performed by Baudilio Arellano MD based on data collection and the provider's statements to me.     Katrin Robles MD, PGY-3  [facility]  July 13, 2022    Provider Disclosure:   The documentation recorded by the scribe accurately reflects the services I personally performed and the decisions made by me.    Staff Physician  Comments:   I saw and evaluated the patient with the resident and I agree with the assessment and plan.  I was present for the examination.    Baudilio Arellano MD  Pronouns: he/him/his    Department of Dermatology  [facility]: Phone: [phone], Fax:[phone]  [facility]: Phone: [phone] Fax: [phone]  ____________________________________________    CC: Hair Loss (Mona is here for hair loss workup, reports no changes since her last visit)    HPI:  Ms. Mona Wagner is a(n) 31 year old female who presents today as a return patient for hair loss. Last seen by myself on 6/8/22 at which point an US was ordered of a nodule in the R axilla that returned benign (suspected lymph node with prominent fatty hilum). She otherwise had a benign skin exam.     She reports she has noticed hair shedding after her transplant 08/2019. Noticed that she has had more hair loss since 05/2022 when she had COVID. COVID illness was mild. Positive for 18 days with home tests, but symptoms were mild with stuffy nose and sore throat. Notices it the most when she brushes her hair. Noticing the most thinness of hair on the top and on the sides, maybe in the back but she cannot tell. She does not have any patches of hair loss that she is aware of.     Has been using Atomy Saengmodan since May 2021 for hair thinning until Jan 2022, thinks it has helped. Recently restarted last month. Wants to make sure it is safe.     Transplant doctors thought tacrolimus may have been making her hair shed, and were going to refer her to a dermatologist. She was not worried about it initially, but now is more concerned because her hair is much thinner.     The bump in her right armpit has been there for 3 years. Has not changed in that time. Non-tender. She would prefer not to have a surgery if it is not needed. Did have a similar  enlarged lymph node in her groin about 10 years ago, had an ultrasound that showed it was an enlarged lymph node. She can no longer feel it, and it was not removed.     Patient is otherwise feeling well, without additional skin concerns.    Labs Reviewed:  BMP 7/11/22 WNL  CBC 7/11/22 WNL, Hgb 11.9  TSH 2.49 11/11/21    Physical Exam:  Vitals: There were no vitals taken for this visit.  SKIN: Focused examination of right axilla and scalp was performed.  - approximately 1 cm non-tender rubbery-textured nodule in center of right axilla  - Hair pull test is positive.  There is decreased hair density on the crown and vertex scalp. Hair density is normal in occipital regions    - No other lesions of concern on areas examined.     Medications:  Current Outpatient Medications   Medication     acetaminophen (TYLENOL) 325 MG tablet     aspirin (ASA) 81 MG chewable tablet     atorvastatin (LIPITOR) 10 MG tablet     azaTHIOprine (IMURAN) 50 MG tablet     cyclobenzaprine (FLEXERIL) 5 MG tablet     diphenhydrAMINE (BENADRYL) 25 MG capsule     EPINEPHrine (ANY BX GENERIC EQUIV) 0.3 MG/0.3ML injection 2-pack     famotidine (PEPCID) 20 MG tablet     fludrocortisone (FLORINEF) 0.1 MG tablet     hydrOXYzine (ATARAX) 10 MG tablet     levothyroxine (SYNTHROID/LEVOTHROID) 25 MCG tablet     minoxidil (ROGAINE) 5 % external solution     norethindrone (MICRONOR) 0.35 MG tablet     polyethylene glycol (MIRALAX) 17 GM/Dose powder     Prenatal Vit-Fe Fumarate-FA (PRENATAL MULTIVITAMIN W/IRON) 27-0.8 MG tablet     Prenatal Vit-Fe Fumarate-FA (PRENATAL VITAMINS) 28-0.8 MG TABS     senna-docusate (SENOKOT-S/PERICOLACE) 8.6-50 MG tablet     sennosides (SENOKOT) 8.6 MG tablet     simethicone (MYLICON) 125 MG chewable tablet     sodium chloride (OCEAN) 0.65 % nasal spray     tacrolimus (GENERIC EQUIVALENT) 0.5 MG capsule     tacrolimus (GENERIC EQUIVALENT) 1 MG capsule     No current facility-administered medications for this visit.      Past  Medical History:   Patient Active Problem List   Diagnosis     DVT of upper extremity (deep vein thrombosis) (H)     Seizure disorder (H)     Galactorrhea     Acquired hypothyroidism     Increased prolactin level     Hypomagnesemia     Infective endocarditis-history of.  1/2019.  resolved.      Aftercare following organ transplant     Immunosuppression (H)     Methemoglobinemia     Kidney replaced by transplant     Anxiety     Secondary renal hyperparathyroidism (H)     Vitamin D deficiency     CKD (chronic kidney disease) stage 3, GFR 30-59 ml/min (H)     Stricture or kinking of ureter     Pyelonephritis     Diarrhea     Bicornate uterus     Normal Pap 3/2020.  repeat 3/2023     Prophylactic antibiotic     Hydronephrosis     Hydronephrosis of kidney transplant     Ureter, stricture     Urinary tract infection     Past Medical History:   Diagnosis Date     Anemia in chronic kidney disease      History of bacterial endocarditis      History of blood transfusion      History of DVT (deep vein thrombosis) 2014     History of seizure 2014     History of subarachnoid hemorrhage      Hydronephrosis      Hyperprolactinemia (H)      Hypertension     resolved after transplant     Hypothyroidism      Kidney transplanted 08/03/2019    DCD DDKT. Induction with thymo 6mg/kg.     Orthostatic hypotension      FATOUMATA (obstructive sleep apnea)      Pyelonephritis of transplanted kidney 01/23/2020     Secondary renal hyperparathyroidism (H)      Urinary tract infection         CC No referring provider defined for this encounter. on close of this encounter.

## 2022-07-13 NOTE — NURSING NOTE
Dermatology Rooming Note    Mona Wagner's goals for this visit include:   Chief Complaint   Patient presents with     Hair Loss     Mona is here for hair loss workup, reports no changes since her last visit     Zabrina Grady, EMT

## 2022-07-16 LAB — ZINC SERPL-MCNC: 75 UG/DL

## 2022-07-18 NOTE — TELEPHONE ENCOUNTER
REFERRAL INFORMATION:    Referring Provider:  Dr. Baudilio Arellano     Referring Clinic:  [facility] [place]     Reason for Visit/Diagnosis: Right axillary lymph node excision        FUTURE VISIT INFORMATION:    Appointment Date: 8/5/2022    Appointment Time: 10:30 AM      NOTES RECORD STATUS  DETAILS   OFFICE NOTE from Referring Provider Internal 7/13/2022, 6/8/2022 Office visit with Dr. Arellano      OFFICE NOTE from Other Specialists N/A    HOSPITAL DISCHARGE SUMMARY/ ED VISITS  N/A    OPERATIVE REPORT N/A    ENDOSCOPY (EGD)  N/A    PERTINENT LABS Internal    PATHOLOGY REPORTS (RELATED) N/A    IMAGING (CT, MRI, US, XR)  Internal US Upper Extremity: 7/6/2022

## 2022-08-02 ENCOUNTER — LAB (OUTPATIENT)
Dept: LAB | Facility: HOSPITAL | Age: 32
End: 2022-08-02
Payer: MEDICARE

## 2022-08-02 ENCOUNTER — VIRTUAL VISIT (OUTPATIENT)
Dept: NEPHROLOGY | Facility: CLINIC | Age: 32
End: 2022-08-02
Attending: INTERNAL MEDICINE
Payer: MEDICARE

## 2022-08-02 VITALS — BODY MASS INDEX: 30.66 KG/M2 | WEIGHT: 157 LBS

## 2022-08-02 DIAGNOSIS — N13.5 URETERAL STENOSIS OF KIDNEY TRANSPLANT: ICD-10-CM

## 2022-08-02 DIAGNOSIS — E83.52 HYPERCALCEMIA: Primary | ICD-10-CM

## 2022-08-02 DIAGNOSIS — Z48.298 AFTERCARE FOLLOWING ORGAN TRANSPLANT: ICD-10-CM

## 2022-08-02 DIAGNOSIS — E21.2 TERTIARY HYPERPARATHYROIDISM (H): ICD-10-CM

## 2022-08-02 DIAGNOSIS — I95.1 ORTHOSTATIC HYPOTENSION: ICD-10-CM

## 2022-08-02 DIAGNOSIS — T86.19 URETERAL STENOSIS OF KIDNEY TRANSPLANT: ICD-10-CM

## 2022-08-02 DIAGNOSIS — Z79.899 ENCOUNTER FOR LONG-TERM CURRENT USE OF MEDICATION: ICD-10-CM

## 2022-08-02 DIAGNOSIS — D84.9 IMMUNOSUPPRESSION (H): ICD-10-CM

## 2022-08-02 DIAGNOSIS — Z94.0 KIDNEY REPLACED BY TRANSPLANT: Primary | ICD-10-CM

## 2022-08-02 DIAGNOSIS — Z94.0 KIDNEY REPLACED BY TRANSPLANT: ICD-10-CM

## 2022-08-02 PROBLEM — D74.9 METHEMOGLOBINEMIA: Status: RESOLVED | Noted: 2019-08-09 | Resolved: 2022-08-02

## 2022-08-02 PROBLEM — N13.30 HYDRONEPHROSIS: Status: RESOLVED | Noted: 2020-08-14 | Resolved: 2022-08-02

## 2022-08-02 PROBLEM — N39.0 URINARY TRACT INFECTION: Status: RESOLVED | Noted: 2021-02-26 | Resolved: 2022-08-02

## 2022-08-02 PROBLEM — N12 PYELONEPHRITIS: Status: RESOLVED | Noted: 2020-01-23 | Resolved: 2022-08-02

## 2022-08-02 PROBLEM — Z79.2 PROPHYLACTIC ANTIBIOTIC: Status: RESOLVED | Noted: 2020-07-10 | Resolved: 2022-08-02

## 2022-08-02 PROBLEM — Z12.4 CERVICAL CANCER SCREENING: Status: RESOLVED | Noted: 2020-03-24 | Resolved: 2022-08-02

## 2022-08-02 PROBLEM — R19.7 DIARRHEA: Status: RESOLVED | Noted: 2020-01-26 | Resolved: 2022-08-02

## 2022-08-02 LAB
ANION GAP SERPL CALCULATED.3IONS-SCNC: 6 MMOL/L (ref 5–18)
BUN SERPL-MCNC: 17 MG/DL (ref 8–22)
CALCIUM SERPL-MCNC: 10.7 MG/DL (ref 8.5–10.5)
CHLORIDE BLD-SCNC: 110 MMOL/L (ref 98–107)
CO2 SERPL-SCNC: 24 MMOL/L (ref 22–31)
CREAT SERPL-MCNC: 0.85 MG/DL (ref 0.6–1.1)
ERYTHROCYTE [DISTWIDTH] IN BLOOD BY AUTOMATED COUNT: 14.6 % (ref 10–15)
GFR SERPL CREATININE-BSD FRML MDRD: >90 ML/MIN/1.73M2
GLUCOSE BLD-MCNC: 89 MG/DL (ref 70–125)
HCT VFR BLD AUTO: 38.1 % (ref 35–47)
HGB BLD-MCNC: 12 G/DL (ref 11.7–15.7)
MCH RBC QN AUTO: 30.1 PG (ref 26.5–33)
MCHC RBC AUTO-ENTMCNC: 31.5 G/DL (ref 31.5–36.5)
MCV RBC AUTO: 96 FL (ref 78–100)
PLATELET # BLD AUTO: 219 10E3/UL (ref 150–450)
POTASSIUM BLD-SCNC: 4.4 MMOL/L (ref 3.5–5)
RBC # BLD AUTO: 3.99 10E6/UL (ref 3.8–5.2)
SODIUM SERPL-SCNC: 140 MMOL/L (ref 136–145)
TACROLIMUS BLD-MCNC: 4.7 UG/L (ref 5–15)
TME LAST DOSE: ABNORMAL H
TME LAST DOSE: ABNORMAL H
WBC # BLD AUTO: 4.2 10E3/UL (ref 4–11)

## 2022-08-02 PROCEDURE — 85027 COMPLETE CBC AUTOMATED: CPT

## 2022-08-02 PROCEDURE — 36415 COLL VENOUS BLD VENIPUNCTURE: CPT

## 2022-08-02 PROCEDURE — 80197 ASSAY OF TACROLIMUS: CPT

## 2022-08-02 PROCEDURE — G0463 HOSPITAL OUTPT CLINIC VISIT: HCPCS | Mod: PN,RTG | Performed by: INTERNAL MEDICINE

## 2022-08-02 PROCEDURE — 99215 OFFICE O/P EST HI 40 MIN: CPT | Mod: 95 | Performed by: INTERNAL MEDICINE

## 2022-08-02 PROCEDURE — 80048 BASIC METABOLIC PNL TOTAL CA: CPT

## 2022-08-02 RX ORDER — CINACALCET 30 MG/1
30 TABLET, FILM COATED ORAL DAILY
Qty: 30 TABLET | Refills: 3 | Status: SHIPPED | OUTPATIENT
Start: 2022-08-02 | End: 2022-08-02

## 2022-08-02 ASSESSMENT — PAIN SCALES - GENERAL: PAINLEVEL: NO PAIN (0)

## 2022-08-02 NOTE — PATIENT INSTRUCTIONS
Patient Recommendations:  - ok to take azathioprine with dinner  -Start prenatal vitamin now  -At the end of the month stop florinef and atorvastatin  -Once you are pregnant please stop topical minoxidil    Transplant Patient Information  Your Post Transplant Coordinator is: Danielle Smith  You and your care team can contact your transplant coordinator Monday - Friday, 8am - 5pm at [phone] (Option 2 to reach the coordinator or Option 4 to schedule an appointment).  You can also reach your care team online via [employer].  After hours for urgent matters, please call [facility] at [phone].

## 2022-08-02 NOTE — LETTER
8/2/2022       RE: Mona Wagner  [address]  [place] [address]     Dear Colleague,    Thank you for referring your patient, Mona Wagner, to the [facility] [place] at [facility]. Please see a copy of my visit note below.      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 Stent replaced most recently 11/23/20.    - Baseline Cr ~ 0.8-1.1   - Proteinuria: Minimal (0.2-0.5 grams)   - Date DSA Last Checked: Aug/2021      Latest DSA: No   - BK Viremia: No Aug 2021   - Kidney Tx Biopsy: Sep 17, 2019; Result: No diagnostic evidence of acute rejection.     -Labs again in 1 month and then can space out to q2 months. When she becomes pregnant will increase frequency     # Immunosuppression: Tacrolimus immediate release (goal 4-6) and Azathioprine (dose 150 mg daily)    -Continue with intensive monitoring of immunosuppression for efficacy and toxicity   - Changes: No. Ok to take AZA with dinner instead of breakfast    # Infection Prophylaxis:   Last CD4 Level: 201 ( July 2020)   - PJP: None    # Orthostatic Hypotension: Controlled, but low at times on Florinef 0.1mg MWF;  Goal BP: > 100, but < 130 systolic   - Changes: No. Class C during pregnancy so would try to hold if she could tolerate     # Anemia in Chronic Renal Disease: Hgb: Stable, now normal      CHARLINE: No   - Iron studies: Replete ( 8/2020)     # Mineral Bone Disorder:   - Secondary renal hyperparathyroidism; PTH level: Mildly elevated (151-300 pg/ml)        On treatment: No  - Vitamin D; level: Low        On Supplement: No; Not on treatment due to hypercalcemia  - Calcium; level: High        On Supplement: No. Off of sensipar  as she is trying to become pregnant    # Electrolytes:   - Potassium; level: Normal        On supplement: No  - Magnesium; level: Normal        On supplement: Yes  - Bicarbonate; level: Normal        On supplement: No    # Ureteral stenosis:   -Stent removed Feb 2021. Follows with urology . Trying to avoid further stents due to recurrent UTIs              -  Allograft US July 2021: moderate hydronephrosis unchanged with voiding. Unchanged on 7/6/22 abdominal CT    # Hypercalcemia:   -Due to tertiary hyperparathyroidism   -Holding sensipar as she is trying to become pregnant   -CT A/P 7/6/22 with 1mm non obstructing stone    # Hyperprolactinemia: Normal prolactin level with last check.  Felt secondary to hypothyroidism versus kidney failure, or less likely now a pituitary microadenoma.  Followed by Endocrinology.    # Future plans for pregnancy:   -The patient [facility] 12/24/20 and she knows that she would need to switched from MPA to AZA and wait 3 months prior to pregnancy. That will be the end of 8/2022               - During pregnancy patient would try to stop Florinef ( category C). Would continue to hold Sensipar as long as calcium not >11.  Patient will continue aspirin 81 mg daily.  Patient will have to hold atorvastatin during pregnancy   -Stop florinef and atorvastatin at the end of the month. Stop topical minoxidil once she becomes pregnant   -Start prenatal vitamin now    # R Axillary node:   -Referred to general surgery for excisional biopsy. She sees them 8/5/22. 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 Seen by dermatology on 7/13/22   -Currently on topical minoxidil. Once she becomes pregnant she should stop    # Medical Compliance: Yes     # COVID-19 Virus Review: Discussed COVID-19 virus and the potential medical risks.  Reviewed preventative health recommendations, which  includes washing hands for 20 seconds, avoid touching your face, and social distancing.  Asked patient to inform the transplant center if they are exposed or diagnosed with this virus.   -Tested positive 5/4/22    # COVID Vaccine Completed: Yes    # Transplant History:  Etiology of Kidney Failure: Unknown etiology (no kidney biopsy)  Tx: DDKT  Transplant: 8/3/2019 (Kidney)  Donor Type: Donation after Circulatory Death  Donor Class: Standard Criteria Donor  Crossmatch at time of Tx: negative  DSA at time of Tx: No  Significant changes in immunosuppression: None  Significant transplant-related complications: None    Transplant Office Phone Number: [phone]    Assessment and plan was discussed with the patient and she voiced her understanding and agreement.    Return visit: Return in about 6 months (around 2/2/2023).    Gelacio Mcintyre MD     I spent 80 minutes on the date of the encounter doing chart review, review of outside records, review of test results, interpretation of tests, patient visit and documentation      Chief Complaint   Ms. Wagner is a 31 year old here for kidney transplant and immunosuppression management.     History of Present Illness      Interval history    She states that she has gained some weight since starting AZA because she is now eating breakfast as it caused abdominal discomfort if she took it on an empty stomach.     Home BP: 110s systolic        Problem List   Patient Active Problem List   Diagnosis     DVT of upper extremity (deep vein thrombosis) (H)     Seizure disorder (H)     Galactorrhea     Acquired hypothyroidism     Increased prolactin level     Hypomagnesemia     Infective endocarditis-history of.  1/2019.  resolved.      Aftercare following organ transplant     Immunosuppression (H)     Methemoglobinemia     Kidney replaced by transplant     Anxiety     Secondary renal hyperparathyroidism (H)     Vitamin D deficiency     CKD (chronic kidney disease) stage 3, GFR 30-59 ml/min  (H)     Stricture or kinking of ureter     Pyelonephritis     Diarrhea     Bicornate uterus     Normal Pap 3/2020.  repeat 3/2023     Prophylactic antibiotic     Hydronephrosis     Hydronephrosis of kidney transplant     Ureter, stricture     Urinary tract infection       Social History   Social History     Tobacco Use     Smoking status: Never Smoker     Smokeless tobacco: Never Used   Vaping Use     Vaping Use: Never used   Substance Use Topics     Alcohol use: No     Alcohol/week: 0.0 standard drinks     Drug use: No       Allergies   Allergies   Allergen Reactions     Contrast Dye Rash     CT contrast allergy 12/14/19 rash over eyes. Need to have pre medication before a CT WITH CONTRAST      Lisinopril Swelling     angioedema     Nitrous Oxide Other (See Comments)     Sense of doom     Chlorhexidine Rash     Rash at site     Sulfa Drugs Rash     Muscle stiffness of neck     Dapsone      Methemoglobinemia     Furosemide Other (See Comments)     Skin flushing     Metrogel [Metronidazole]      Hives diffusely on body after vaginal administration.     Azithromycin Dizziness and Rash     Cefuroxime Rash       Medications   Current Outpatient Medications   Medication Sig     acetaminophen (TYLENOL) 325 MG tablet Take 2 tablets (650 mg) by mouth every 4 hours as needed for mild pain     aspirin (ASA) 81 MG chewable tablet Take 1 tablet (81 mg) by mouth daily     atorvastatin (LIPITOR) 10 MG tablet Take 1 tablet (10 mg) by mouth every morning     azaTHIOprine (IMURAN) 50 MG tablet Take 3 tablets (150 mg) by mouth daily     cyclobenzaprine (FLEXERIL) 5 MG tablet Take 1 tablet (5 mg) by mouth 3 times daily as needed for muscle spasms     diphenhydrAMINE (BENADRYL) 25 MG capsule Take 1 capsule (25 mg) by mouth daily as needed for itching or allergies Take 1-2 tablets by mouth every 6 hours PRN itching/allergies     EPINEPHrine (ANY BX GENERIC EQUIV) 0.3 MG/0.3ML injection 2-pack      famotidine (PEPCID) 20 MG tablet Take 1  tablet (20 mg) by mouth 2 times daily     fludrocortisone (FLORINEF) 0.1 MG tablet Take 1 tablet (0.1 mg) by mouth Every Mon, Wed, Fri Morning     hydrOXYzine (ATARAX) 10 MG tablet Take 1 tablet (10 mg) by mouth 3 times daily as needed for anxiety     levothyroxine (SYNTHROID/LEVOTHROID) 25 MCG tablet TAKE 1 TABLET BY MOUTH ON TUESDAY, THURSDAY, SATURDAY, AND SUNDAY, TAKE 1.5 TABLETS(37.5 MCG) ON MONDAY, WEDNESDAY, AND FRIDAY     minoxidil (ROGAINE) 5 % external solution Apply thin layer to scalp at nighttime before bed     norethindrone (MICRONOR) 0.35 MG tablet Take one tablet daily     polyethylene glycol (MIRALAX) 17 GM/Dose powder Take 17 g (1 capful) by mouth daily as needed for constipation     Prenatal Vit-Fe Fumarate-FA (PRENATAL MULTIVITAMIN W/IRON) 27-0.8 MG tablet Take 1 tablet by mouth daily     Prenatal Vit-Fe Fumarate-FA (PRENATAL VITAMINS) 28-0.8 MG TABS Take 1 tablet by mouth daily     senna-docusate (SENOKOT-S/PERICOLACE) 8.6-50 MG tablet Take 1-2 tablets by mouth 2 times daily (Patient taking differently: Take 1-2 tablets by mouth 2 times daily as needed)     sennosides (SENOKOT) 8.6 MG tablet Take 1 tablet by mouth daily     simethicone (MYLICON) 125 MG chewable tablet Take 1 tablet (125 mg) by mouth 4 times daily as needed for intestinal gas     sodium chloride (OCEAN) 0.65 % nasal spray Spray one spray in each nostril three times daily for congestion     tacrolimus (GENERIC EQUIVALENT) 0.5 MG capsule HOLD     tacrolimus (GENERIC EQUIVALENT) 1 MG capsule Take 3 capsules (3 mg) by mouth 2 times daily     No current facility-administered medications for this visit.     There are no discontinued medications.     Physical Exam    Wt 71.2 kg (157 lb)   BMI 30.66 kg/m        GENERAL APPEARANCE: alert and no distress  HENT: no obvious abnormalities on appearance  RESP: breathing appears unremarkable with normal rate, no audible wheezing or cough and no apparent shortness of breath with  conversation  MS: extremities normal - no gross deformities noted  SKIN: no apparent rash and normal skin tone  NEURO: speech is clear with no obvious neurological deficits  PSYCH: mentation appears normal and affect normal      Data     Renal Latest Ref Rng & Units 8/2/2022 7/11/2022 6/24/2022   Na 136 - 145 mmol/L 140 137 140   Na (external) 136 - 145 mmol/L - - -   K 3.5 - 5.0 mmol/L 4.4 4.4 4.5   K (external) 3.5 - 5.0 mmol/L - - -   Cl 98 - 107 mmol/L 110(H) 108(H) 109   CO2 22 - 31 mmol/L 24 25 25   CO2 (external) 22 - 31 mmol/L - - -   BUN 8 - 22 mg/dL 17 15 15   BUN (external) 8 - 22 mg/dL - - -   Cr 0.60 - 1.10 mg/dL 0.85 0.86 1.00   Cr (external) 0.60 - 1 mg/dL - - -   Glucose 70 - 125 mg/dL 89 92 101(H)   Glucose (external) 70 - 125 mg/dL - - -   Ca  8.5 - 10.5 mg/dL 10.7(H) 10.0 10.7(H)   Ca (external) 8.5 - 10.5 mg/dL - - -   Mg 1.6 - 2.3 mg/dL - - -   Mg (external) 1.8 - 2.6 mg/dL - - -     Bone Health Latest Ref Rng & Units 7/13/2022 6/1/2022 4/1/2022   Phos 2.5 - 4.5 mg/dL - 2.1(L) 2.2(L)   Phos (external) 2.5 - 4.5 mg/dL - - -   PTHi 10 - 86 pg/mL - - 365(H)   PTHi (external) 18 - 80 pg/mL - - -   Vit D Def 20 - 75 ug/L 14(L) - -     Heme Latest Ref Rng & Units 8/2/2022 7/11/2022 6/22/2022   WBC 4.0 - 11.0 10e3/uL 4.2 5.3 6.1   WBC (external) 4.0 - 11.0 thou/uL - - -   Hgb 11.7 - 15.7 g/dL 12.0 11.9 12.3   Hgb (external) 12.0 - 16.0 g/dL - - -   Plt 150 - 450 10e3/uL 219 211 240   Plt (external) 140 - 440 thou/uL - - -   ABSOLUTE NEUTROPHIL 1.6 - 8.3 10e9/L - - -   ABSOLUTE NEUTROPHILS (EXTERNAL) 2.0 - 7.7 thou/uL - - -   ABSOLUTE LYMPHOCYTES 0.8 - 5.3 10e9/L - - -   ABSOLUTE LYMPHOCYTES (EXTERNAL) 0.8 - 4.4 thou/uL - - -   ABSOLUTE MONOCYTES 0.0 - 1.3 10e9/L - - -   ABSOLUTE MONOCYTES (EXTERNAL) 0.0 - 0.9 thou/uL - - -   ABSOLUTE EOSINOPHILS 0.0 - 0.7 10e9/L - - -   ABSOLUTE EOSINOPHILS (EXTERNAL) 0.0 - 0.4 thou/uL - - -   ABSOLUTE BASOPHILS 0.0 - 0.2 10e9/L - - -   ABSOLUTE BASOPHILS (EXTERNAL)  0.0 - 0.2 thou/uL - - -   ABS IMMATURE GRANULOCYTES 0 - 0.4 10e9/L - - -   ABSOLUTE NUCLEATED RBC - - - -     Liver Latest Ref Rng & Units 6/22/2022 8/4/2020 2/3/2020   AP 45 - 120 U/L 110 185(H) 126   AP (external) 34 - 104 U/L - - -   TBili 0.0 - 1.0 mg/dL 0.9 0.7 0.6   DBili 0.0 - 0.2 mg/dL - <0.1 0.1   ALT 0 - 45 U/L 10 19 20   ALT (external) 7 - 52 U/L - - -   AST 0 - 40 U/L 16 11 13   AST (external) 13 - 39 U/L - - -   Tot Protein 6.0 - 8.0 g/dL 8.0 7.9 7.4   Tot Protein (external) 6.4 - 8.9 g/dL - - -   Albumin 3.5 - 5.0 g/dL 4.4 4.0 3.7   Albumin (external) - - - -     Pancreas Latest Ref Rng & Units 6/22/2022 8/4/2020 2/3/2020   A1C 0 - 5.6 % - 5.5 5.7(H)   Lipase 0 - 52 U/L 39 - -     Iron studies Latest Ref Rng & Units 7/13/2022 8/4/2020 2/17/2020   Iron 35 - 180 ug/dL 51 64 95   Iron sat 15 - 46 % 24 32 42   Ferritin 12 - 150 ng/mL 1,111(H) 1,065(H) 1,139(H)   Ferritin (external) 10 - 291 ng/mL - - -     [facility] Txp Virology Latest Ref Rng & Units 7/7/2021 6/2/2021 5/3/2021   CVM DNA Quant - - - -   CMV QUANT IU/ML CMVND:CMV DNA Not Detected [IU]/mL - - -   LOG IU/ML OF CMVQNT <2.1 [Log:IU]/mL - - -   BK Spec - Plasma Plasma Plasma   BK Res BKNEG:BK Virus DNA Not Detected copies/mL BK Virus DNA Not Detected BK Virus DNA Not Detected BK Virus DNA Not Detected   BK Log <2.7 Log copies/mL Not Calculated Not Calculated Not Calculated   EBV VCA IGG ANTIBODY U/mL - - -   EBV CAPSID ANTIBODY IGG 0.0 - 0.8 AI - - -   EBV DNA COPIES/ML EBVNEG:EBV DNA Not Detected [Copies]/mL - - -   EBV DNA LOG OF COPIES <2.7 [Log:copies]/mL - - -   Hep B Surf - - - -        Recent Labs   Lab Test 04/01/22  1000 06/01/22  1020 07/11/22  1041   DOSTAC 3/31/2022 5/31/2022 7/10/2022   TACROL 5.4 4.4* 5.1     Recent Labs   Lab Test 08/16/19  0807 09/03/19  0944   DOSMPA Not Provided 0840pm   MPACID 1.92 3.39   MPAG 149.2* 52.8       Again, thank you for allowing me to participate in the care of your patient.      Sincerely,    Gelacio  MD Miguelina

## 2022-08-02 NOTE — PROGRESS NOTES
Mona is a 31 year old who is being evaluated via a billable video visit.      How would you like to obtain your AVS? MyChart  If the video visit is dropped, the invitation should be resent by: Send to e-mail at: [email or web address]  Will anyone else be joining your video visit? No      Video-Visit Details    Video Start Time: 4:35 PM    Type of service:  Video Visit    Video End Time:5:01 PM    Originating Location (pt. 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 Stent replaced most recently 11/23/20.    - Baseline Cr ~ 0.8-1.1   - Proteinuria: Minimal (0.2-0.5 grams)   - Date DSA Last Checked: Aug/2021      Latest DSA: No   - BK Viremia: No Aug 2021   - Kidney Tx Biopsy: Sep 17, 2019; Result: No diagnostic evidence of acute rejection.     -Labs again in 1 month and then can space out to q2 months. When she becomes pregnant will increase frequency     # Immunosuppression: Tacrolimus immediate release (goal 4-6) and Azathioprine (dose 150 mg daily)    -Continue with intensive monitoring of immunosuppression for efficacy and toxicity   - Changes: No. Ok to take AZA with dinner instead of breakfast    # Infection Prophylaxis:   Last CD4 Level: 201 ( July 2020)   - PJP: None    # Orthostatic Hypotension: Controlled, but low at times on Florinef 0.1mg MWF;  Goal BP: > 100, but < 130 systolic   - Changes: No. Class C during pregnancy so would try to hold if she could tolerate     # Anemia in Chronic Renal Disease: Hgb: Stable, now normal      CHARLINE: No   - Iron studies: Replete ( 8/2020)     # Mineral Bone Disorder:    - Secondary renal hyperparathyroidism; PTH level: Mildly elevated (151-300 pg/ml)        On treatment: No  - Vitamin D; level: Low        On Supplement: No; Not on treatment due to hypercalcemia  - Calcium; level: High        On Supplement: No. Off of sensipar as she is trying to become pregnant    # Electrolytes:   - Potassium; level: Normal        On supplement: No  - Magnesium; level: Normal        On supplement: Yes  - Bicarbonate; level: Normal        On supplement: No    # Ureteral stenosis:   -Stent removed Feb 2021. Follows with urology . Trying to avoid further stents due to recurrent UTIs              -  Allograft US July 2021: moderate hydronephrosis unchanged with voiding. Unchanged on 7/6/22 abdominal CT    # Hypercalcemia:   -Due to tertiary hyperparathyroidism   -Holding sensipar as she is trying to become pregnant   -CT A/P 7/6/22 with 1mm non obstructing stone    # Hyperprolactinemia: Normal prolactin level with last check.  Felt secondary to hypothyroidism versus kidney failure, or less likely now a pituitary microadenoma.  Followed by Endocrinology.    # Future plans for pregnancy:   -The patient saw [facility] 12/24/20 and she knows that she would need to switched from MPA to AZA and wait 3 months prior to pregnancy. That will be the end of 8/2022               - During pregnancy patient would try to stop Florinef ( category C). Would continue to hold Sensipar as long as calcium not >11.  Patient will continue aspirin 81 mg daily.  Patient will have to hold atorvastatin during pregnancy   -Stop florinef and atorvastatin at the end of the month. Stop topical minoxidil once she becomes pregnant   -Start prenatal vitamin now    # R Axillary node:   -Referred to general surgery for excisional biopsy. She sees them 8/5/22. 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 Seen by  dermatology on 7/13/22   -Currently on topical minoxidil. Once she becomes pregnant she should stop    # Medical Compliance: Yes     # COVID-19 Virus Review: Discussed COVID-19 virus and the potential medical risks.  Reviewed preventative health recommendations, which includes washing hands for 20 seconds, avoid touching your face, and social distancing.  Asked patient to inform the transplant center if they are exposed or diagnosed with this virus.   -Tested positive 5/4/22    # COVID Vaccine Completed: Yes    # Transplant History:  Etiology of Kidney Failure: Unknown etiology (no kidney biopsy)  Tx: DDKT  Transplant: 8/3/2019 (Kidney)  Donor Type: Donation after Circulatory Death  Donor Class: Standard Criteria Donor  Crossmatch at time of Tx: negative  DSA at time of Tx: No  Significant changes in immunosuppression: None  Significant transplant-related complications: None    Transplant Office Phone Number: [phone]    Assessment and plan was discussed with the patient and she voiced her understanding and agreement.    Return visit: Return in about 6 months (around 2/2/2023).    Gelacio Mcintyre MD     I spent 80 minutes on the date of the encounter doing chart review, review of outside records, review of test results, interpretation of tests, patient visit and documentation      Chief Complaint   Ms. Wagner is a 31 year old here for kidney transplant and immunosuppression management.     History of Present Illness      Interval history    She states that she has gained some weight since starting AZA because she is now eating breakfast as it caused abdominal discomfort if she took it on an empty stomach.     Home BP: 110s systolic        Problem List   Patient Active Problem List   Diagnosis     DVT of upper extremity (deep vein thrombosis) (H)     Seizure disorder (H)     Galactorrhea     Acquired hypothyroidism     Increased prolactin level     Hypomagnesemia     Infective endocarditis-history of.  1/2019.   resolved.      Aftercare following organ transplant     Immunosuppression (H)     Methemoglobinemia     Kidney replaced by transplant     Anxiety     Secondary renal hyperparathyroidism (H)     Vitamin D deficiency     CKD (chronic kidney disease) stage 3, GFR 30-59 ml/min (H)     Stricture or kinking of ureter     Pyelonephritis     Diarrhea     Bicornate uterus     Normal Pap 3/2020.  repeat 3/2023     Prophylactic antibiotic     Hydronephrosis     Hydronephrosis of kidney transplant     Ureter, stricture     Urinary tract infection       Social History   Social History     Tobacco Use     Smoking status: Never Smoker     Smokeless tobacco: Never Used   Vaping Use     Vaping Use: Never used   Substance Use Topics     Alcohol use: No     Alcohol/week: 0.0 standard drinks     Drug use: No       Allergies   Allergies   Allergen Reactions     Contrast Dye Rash     CT contrast allergy 12/14/19 rash over eyes. Need to have pre medication before a CT WITH CONTRAST      Lisinopril Swelling     angioedema     Nitrous Oxide Other (See Comments)     Sense of doom     Chlorhexidine Rash     Rash at site     Sulfa Drugs Rash     Muscle stiffness of neck     Dapsone      Methemoglobinemia     Furosemide Other (See Comments)     Skin flushing     Metrogel [Metronidazole]      Hives diffusely on body after vaginal administration.     Azithromycin Dizziness and Rash     Cefuroxime Rash       Medications   Current Outpatient Medications   Medication Sig     acetaminophen (TYLENOL) 325 MG tablet Take 2 tablets (650 mg) by mouth every 4 hours as needed for mild pain     aspirin (ASA) 81 MG chewable tablet Take 1 tablet (81 mg) by mouth daily     atorvastatin (LIPITOR) 10 MG tablet Take 1 tablet (10 mg) by mouth every morning     azaTHIOprine (IMURAN) 50 MG tablet Take 3 tablets (150 mg) by mouth daily     cyclobenzaprine (FLEXERIL) 5 MG tablet Take 1 tablet (5 mg) by mouth 3 times daily as needed for muscle spasms     diphenhydrAMINE  (BENADRYL) 25 MG capsule Take 1 capsule (25 mg) by mouth daily as needed for itching or allergies Take 1-2 tablets by mouth every 6 hours PRN itching/allergies     EPINEPHrine (ANY BX GENERIC EQUIV) 0.3 MG/0.3ML injection 2-pack      famotidine (PEPCID) 20 MG tablet Take 1 tablet (20 mg) by mouth 2 times daily     fludrocortisone (FLORINEF) 0.1 MG tablet Take 1 tablet (0.1 mg) by mouth Every Mon, Wed, Fri Morning     hydrOXYzine (ATARAX) 10 MG tablet Take 1 tablet (10 mg) by mouth 3 times daily as needed for anxiety     levothyroxine (SYNTHROID/LEVOTHROID) 25 MCG tablet TAKE 1 TABLET BY MOUTH ON TUESDAY, THURSDAY, SATURDAY, AND SUNDAY, TAKE 1.5 TABLETS(37.5 MCG) ON MONDAY, WEDNESDAY, AND FRIDAY     minoxidil (ROGAINE) 5 % external solution Apply thin layer to scalp at nighttime before bed     norethindrone (MICRONOR) 0.35 MG tablet Take one tablet daily     polyethylene glycol (MIRALAX) 17 GM/Dose powder Take 17 g (1 capful) by mouth daily as needed for constipation     Prenatal Vit-Fe Fumarate-FA (PRENATAL MULTIVITAMIN W/IRON) 27-0.8 MG tablet Take 1 tablet by mouth daily     Prenatal Vit-Fe Fumarate-FA (PRENATAL VITAMINS) 28-0.8 MG TABS Take 1 tablet by mouth daily     senna-docusate (SENOKOT-S/PERICOLACE) 8.6-50 MG tablet Take 1-2 tablets by mouth 2 times daily (Patient taking differently: Take 1-2 tablets by mouth 2 times daily as needed)     sennosides (SENOKOT) 8.6 MG tablet Take 1 tablet by mouth daily     simethicone (MYLICON) 125 MG chewable tablet Take 1 tablet (125 mg) by mouth 4 times daily as needed for intestinal gas     sodium chloride (OCEAN) 0.65 % nasal spray Spray one spray in each nostril three times daily for congestion     tacrolimus (GENERIC EQUIVALENT) 0.5 MG capsule HOLD     tacrolimus (GENERIC EQUIVALENT) 1 MG capsule Take 3 capsules (3 mg) by mouth 2 times daily     No current facility-administered medications for this visit.     There are no discontinued medications.     Physical  Exam    Wt 71.2 kg (157 lb)   BMI 30.66 kg/m        GENERAL APPEARANCE: alert and no distress  HENT: no obvious abnormalities on appearance  RESP: breathing appears unremarkable with normal rate, no audible wheezing or cough and no apparent shortness of breath with conversation  MS: extremities normal - no gross deformities noted  SKIN: no apparent rash and normal skin tone  NEURO: speech is clear with no obvious neurological deficits  PSYCH: mentation appears normal and affect normal      Data     Renal Latest Ref Rng & Units 8/2/2022 7/11/2022 6/24/2022   Na 136 - 145 mmol/L 140 137 140   Na (external) 136 - 145 mmol/L - - -   K 3.5 - 5.0 mmol/L 4.4 4.4 4.5   K (external) 3.5 - 5.0 mmol/L - - -   Cl 98 - 107 mmol/L 110(H) 108(H) 109   CO2 22 - 31 mmol/L 24 25 25   CO2 (external) 22 - 31 mmol/L - - -   BUN 8 - 22 mg/dL 17 15 15   BUN (external) 8 - 22 mg/dL - - -   Cr 0.60 - 1.10 mg/dL 0.85 0.86 1.00   Cr (external) 0.60 - 1 mg/dL - - -   Glucose 70 - 125 mg/dL 89 92 101(H)   Glucose (external) 70 - 125 mg/dL - - -   Ca  8.5 - 10.5 mg/dL 10.7(H) 10.0 10.7(H)   Ca (external) 8.5 - 10.5 mg/dL - - -   Mg 1.6 - 2.3 mg/dL - - -   Mg (external) 1.8 - 2.6 mg/dL - - -     Bone Health Latest Ref Rng & Units 7/13/2022 6/1/2022 4/1/2022   Phos 2.5 - 4.5 mg/dL - 2.1(L) 2.2(L)   Phos (external) 2.5 - 4.5 mg/dL - - -   PTHi 10 - 86 pg/mL - - 365(H)   PTHi (external) 18 - 80 pg/mL - - -   Vit D Def 20 - 75 ug/L 14(L) - -     Heme Latest Ref Rng & Units 8/2/2022 7/11/2022 6/22/2022   WBC 4.0 - 11.0 10e3/uL 4.2 5.3 6.1   WBC (external) 4.0 - 11.0 thou/uL - - -   Hgb 11.7 - 15.7 g/dL 12.0 11.9 12.3   Hgb (external) 12.0 - 16.0 g/dL - - -   Plt 150 - 450 10e3/uL 219 211 240   Plt (external) 140 - 440 thou/uL - - -   ABSOLUTE NEUTROPHIL 1.6 - 8.3 10e9/L - - -   ABSOLUTE NEUTROPHILS (EXTERNAL) 2.0 - 7.7 thou/uL - - -   ABSOLUTE LYMPHOCYTES 0.8 - 5.3 10e9/L - - -   ABSOLUTE LYMPHOCYTES (EXTERNAL) 0.8 - 4.4 thou/uL - - -   ABSOLUTE  MONOCYTES 0.0 - 1.3 10e9/L - - -   ABSOLUTE MONOCYTES (EXTERNAL) 0.0 - 0.9 thou/uL - - -   ABSOLUTE EOSINOPHILS 0.0 - 0.7 10e9/L - - -   ABSOLUTE EOSINOPHILS (EXTERNAL) 0.0 - 0.4 thou/uL - - -   ABSOLUTE BASOPHILS 0.0 - 0.2 10e9/L - - -   ABSOLUTE BASOPHILS (EXTERNAL) 0.0 - 0.2 thou/uL - - -   ABS IMMATURE GRANULOCYTES 0 - 0.4 10e9/L - - -   ABSOLUTE NUCLEATED RBC - - - -     Liver Latest Ref Rng & Units 6/22/2022 8/4/2020 2/3/2020   AP 45 - 120 U/L 110 185(H) 126   AP (external) 34 - 104 U/L - - -   TBili 0.0 - 1.0 mg/dL 0.9 0.7 0.6   DBili 0.0 - 0.2 mg/dL - <0.1 0.1   ALT 0 - 45 U/L 10 19 20   ALT (external) 7 - 52 U/L - - -   AST 0 - 40 U/L 16 11 13   AST (external) 13 - 39 U/L - - -   Tot Protein 6.0 - 8.0 g/dL 8.0 7.9 7.4   Tot Protein (external) 6.4 - 8.9 g/dL - - -   Albumin 3.5 - 5.0 g/dL 4.4 4.0 3.7   Albumin (external) - - - -     Pancreas Latest Ref Rng & Units 6/22/2022 8/4/2020 2/3/2020   A1C 0 - 5.6 % - 5.5 5.7(H)   Lipase 0 - 52 U/L 39 - -     Iron studies Latest Ref Rng & Units 7/13/2022 8/4/2020 2/17/2020   Iron 35 - 180 ug/dL 51 64 95   Iron sat 15 - 46 % 24 32 42   Ferritin 12 - 150 ng/mL 1,111(H) 1,065(H) 1,139(H)   Ferritin (external) 10 - 291 ng/mL - - -     [facility] Txp Virology Latest Ref Rng & Units 7/7/2021 6/2/2021 5/3/2021   CVM DNA Quant - - - -   CMV QUANT IU/ML CMVND:CMV DNA Not Detected [IU]/mL - - -   LOG IU/ML OF CMVQNT <2.1 [Log:IU]/mL - - -   BK Spec - Plasma Plasma Plasma   BK Res BKNEG:BK Virus DNA Not Detected copies/mL BK Virus DNA Not Detected BK Virus DNA Not Detected BK Virus DNA Not Detected   BK Log <2.7 Log copies/mL Not Calculated Not Calculated Not Calculated   EBV VCA IGG ANTIBODY U/mL - - -   EBV CAPSID ANTIBODY IGG 0.0 - 0.8 AI - - -   EBV DNA COPIES/ML EBVNEG:EBV DNA Not Detected [Copies]/mL - - -   EBV DNA LOG OF COPIES <2.7 [Log:copies]/mL - - -   Hep B Surf - - - -        Recent Labs   Lab Test 04/01/22  1000 06/01/22  1020 07/11/22  1041   DOSTA 3/31/2022 5/31/2022  7/10/2022   TACROL 5.4 4.4* 5.1     Recent Labs   Lab Test 08/16/19  0807 09/03/19  0944   DOSMPA Not Provided 0840pm   MPACID 1.92 3.39   MPAG 149.2* 52.8

## 2022-08-04 ENCOUNTER — PATIENT OUTREACH (OUTPATIENT)
Dept: SURGERY | Facility: CLINIC | Age: 32
End: 2022-08-04

## 2022-08-04 NOTE — PROGRESS NOTES
Patient Telephone Reminder Call    Date of call:  08/04/22  Phone numbers:  Home number on file [phone] (home)    Reached patient/confirmed appointment:  Yes  Appointment with:   Dr. Seb Nicole  Reason for visit:  Lymphadenopathy

## 2022-08-05 ENCOUNTER — OFFICE VISIT (OUTPATIENT)
Dept: SURGERY | Facility: CLINIC | Age: 32
End: 2022-08-05
Attending: DERMATOLOGY
Payer: MEDICARE

## 2022-08-05 ENCOUNTER — PRE VISIT (OUTPATIENT)
Dept: SURGERY | Facility: CLINIC | Age: 32
End: 2022-08-05

## 2022-08-05 VITALS
SYSTOLIC BLOOD PRESSURE: 120 MMHG | BODY MASS INDEX: 30.51 KG/M2 | DIASTOLIC BLOOD PRESSURE: 72 MMHG | OXYGEN SATURATION: 98 % | HEIGHT: 60 IN | WEIGHT: 155.4 LBS | HEART RATE: 86 BPM

## 2022-08-05 DIAGNOSIS — R22.31 AXILLARY MASS, RIGHT: Primary | ICD-10-CM

## 2022-08-05 DIAGNOSIS — R22.41 NODULE OF SKIN OF RIGHT LOWER EXTREMITY: ICD-10-CM

## 2022-08-05 PROCEDURE — 99203 OFFICE O/P NEW LOW 30 MIN: CPT | Mod: GC | Performed by: SURGERY

## 2022-08-05 ASSESSMENT — ENCOUNTER SYMPTOMS
ARTHRALGIAS: 0
LEG PAIN: 0
SWOLLEN GLANDS: 0
LIGHT-HEADEDNESS: 1
MUSCLE WEAKNESS: 0
MYALGIAS: 1
MUSCLE WEAKNESS: 0
SLEEP DISTURBANCES DUE TO BREATHING: 0
SORE THROAT: 0
SWOLLEN GLANDS: 0
ORTHOPNEA: 0
TASTE DISTURBANCE: 0
LEG PAIN: 0
PALPITATIONS: 0
HOARSE VOICE: 0
ARTHRALGIAS: 0
BRUISES/BLEEDS EASILY: 1
EXERCISE INTOLERANCE: 0
ORTHOPNEA: 0
SINUS CONGESTION: 0
MUSCLE CRAMPS: 1
MYALGIAS: 1
SINUS CONGESTION: 0
TASTE DISTURBANCE: 0
NECK PAIN: 0
BACK PAIN: 1
JOINT SWELLING: 0
SMELL DISTURBANCE: 0
SINUS PAIN: 0
EXERCISE INTOLERANCE: 0
STIFFNESS: 0
MUSCLE CRAMPS: 1
HYPOTENSION: 1
HOARSE VOICE: 0
SYNCOPE: 0
TROUBLE SWALLOWING: 0
NECK PAIN: 0
SMELL DISTURBANCE: 0
PALPITATIONS: 0
HYPERTENSION: 0
TROUBLE SWALLOWING: 0
BACK PAIN: 1
JOINT SWELLING: 0
SINUS PAIN: 0
LIGHT-HEADEDNESS: 1
SORE THROAT: 0
HYPERTENSION: 0
NECK MASS: 0
SYNCOPE: 0
NECK MASS: 0
SLEEP DISTURBANCES DUE TO BREATHING: 0
HYPOTENSION: 1
STIFFNESS: 0
BRUISES/BLEEDS EASILY: 1

## 2022-08-05 ASSESSMENT — PAIN SCALES - GENERAL: PAINLEVEL: MODERATE PAIN (4)

## 2022-08-05 NOTE — NURSING NOTE
Chief Complaint   Patient presents with     New Patient     Right axillary lymph node excision       Vitals:    08/05/22 1030   BP: 120/72   BP Location: Right arm   Patient Position: Sitting   Cuff Size: Adult Regular   Pulse: 86   SpO2: 98%   Weight: 70.5 kg (155 lb 6.4 oz)   Height: 1.524 m (5')       Body mass index is 30.35 kg/m .                          Mikhail Moore, EMT

## 2022-08-05 NOTE — PROGRESS NOTES
[facility] [place] General Surgery Consultation Clinic Note        Mona Wagner  [id number]  1990 August 5, 2022     Requesting Provider: Baudilio Arellano     Dear Macarena Yin,     I had the pleasure of seeing your patient, Mona Wagner.  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 She first noted it 3 years ago; it may have grown slightly but very little, and is not tender. She has not noticed any breast lumps (does self breast exams monthly) or other lymphadenopathy aside from a left groin bump she noticed around 1-2 years ago that went away after a few months. She is anxious about having this lymph node removed because her mother had a salivary gland malignancy, and she knows she is at increased risk of malignancy with her h/o transplant. She is nervous about risks of surgery, but does think she wants the node removed especially because she is thinking about getting pregnant soon so wants this taken care of ahead of time.    She was seen by Dr. Arellano of Dermatology on 7/13/2022. Ultrasound was done on 7/6/2022 showing a likely 1.4 cm mass with vascular flow, possibly lymph node.     A 10-point review of systems was negative except as noted in HPI.  ROS    PAST MEDICAL HISTORY:  DDKT  DVT (port-associated)    Past Medical History:   Diagnosis Date     Anemia in chronic kidney disease      History of bacterial endocarditis      History of blood transfusion      History of DVT (deep vein thrombosis) 2014     History of seizure 2014     History of subarachnoid hemorrhage      Hydronephrosis      Hyperprolactinemia (H)      Hypertension     resolved after transplant     Hypothyroidism      Kidney transplanted  2019    DCD DDKT. Induction with thymo 6mg/kg.     Orthostatic hypotension      FATOUMATA (obstructive sleep apnea)      Pyelonephritis of transplanted kidney 2020     Secondary renal hyperparathyroidism (H)      Urinary tract infection         PAST SURGICAL HISTORY:  Left forearm AVF  Right HD port (, removed a few months later)    Past Surgical History:   Procedure Laterality Date     BENCH KIDNEY N/A 8/3/2019    Procedure: BACKBENCH PREPARATION, ALLOGRAFT, KIDNEY;  Surgeon: Dorian Johnson MD;  Location: [facility] OR     COMBINED CYSTOSCOPY, RETROGRADES, EXCHANGE STENT URETER(S) Right 2020    Procedure: CYSTOSCOPY, CYSOTGRAM, WITH RETROGRADE PYELOGRAM, ureteroscopy,  AND URETERAL STENT;  Surgeon: Wally Britt MD;  Location: [facility] OR     COMBINED CYSTOSCOPY, RETROGRADES, URETEROSCOPY, LASER HOLMIUM LITHOTRIPSY URETER(S), INSERT STENT N/A 2019    Procedure: CYSTOURETEROSCOPY, WITH RETROGRADE PYELOGRAM of transplant kidney, STENT INSERTION, Laser on standby;  Surgeon: Wally Britt MD;  Location: [facility] OR     CREATE FISTULA ARTERIOVENOUS UPPER EXTREMITY  2014    Procedure: CREATE FISTULA ARTERIOVENOUS UPPER EXTREMITY;  Left Wrist Arteriovenous Fistula Placement;  Surgeon: Shashi Castro MD;  Location: [facility] OR     CREATE FISTULA ARTERIOVENOUS UPPER EXTREMITY       CYSTOSCOPY, RETROGRADES, INSERT STENT URETER(S), COMBINED N/A 2020    Procedure: CYSTOSCOPY, WITH RETROGRADE PYELOGRAM, CYSTOGRAM AND URETERAL STENT INSERTION - TRANSPLANT KIDNEY;  Surgeon: Wally Britt MD;  Location: [facility] OR     IR CEREBRAL ANGIOGRAM  2014     PERCUTANEOUS BIOPSY KIDNEY Right 2019    Procedure: Right Kidney Biopsy;  Surgeon: Deny Rios MD;  Location: [facility] OR     RASTA/DIALYSIS CATHETER  12/10/2013          TRANSPLANT KIDNEY RECIPIENT  DONOR N/A 8/3/2019    Procedure: TRANSPLANT, KIDNEY, RECIPIENT,  DONOR with Ureteral Stent Placement;  Surgeon: Dorian Johnson  MD Pato;  Location:  OR        MEDICATIONS:  ASA 81mg  Immunosuppression as below for DDKT    Current Outpatient Medications   Medication     acetaminophen (TYLENOL) 325 MG tablet     aspirin (ASA) 81 MG chewable tablet     atorvastatin (LIPITOR) 10 MG tablet     azaTHIOprine (IMURAN) 50 MG tablet     diphenhydrAMINE (BENADRYL) 25 MG capsule     EPINEPHrine (ANY BX GENERIC EQUIV) 0.3 MG/0.3ML injection 2-pack     famotidine (PEPCID) 20 MG tablet     fludrocortisone (FLORINEF) 0.1 MG tablet     hydrOXYzine (ATARAX) 10 MG tablet     levothyroxine (SYNTHROID/LEVOTHROID) 25 MCG tablet     minoxidil (ROGAINE) 5 % external solution     norethindrone (MICRONOR) 0.35 MG tablet     polyethylene glycol (MIRALAX) 17 GM/Dose powder     Prenatal Vit-Fe Fumarate-FA (PRENATAL MULTIVITAMIN W/IRON) 27-0.8 MG tablet     Prenatal Vit-Fe Fumarate-FA (PRENATAL VITAMINS) 28-0.8 MG TABS     senna-docusate (SENOKOT-S/PERICOLACE) 8.6-50 MG tablet     simethicone (MYLICON) 125 MG chewable tablet     tacrolimus (GENERIC EQUIVALENT) 0.5 MG capsule     tacrolimus (GENERIC EQUIVALENT) 1 MG capsule     cyclobenzaprine (FLEXERIL) 5 MG tablet     sennosides (SENOKOT) 8.6 MG tablet     sodium chloride (OCEAN) 0.65 % nasal spray     No current facility-administered medications for this visit.        ALLERGIES:  Allergies   Allergen Reactions     Contrast Dye Rash     CT contrast allergy 12/14/19 rash over eyes. Need to have pre medication before a CT WITH CONTRAST      Lisinopril Swelling     angioedema     Nitrous Oxide Other (See Comments)     Sense of doom     Chlorhexidine Rash     Rash at site     Sulfa Drugs Rash     Muscle stiffness of neck     Dapsone      Methemoglobinemia     Furosemide Other (See Comments)     Skin flushing     Metrogel [Metronidazole]      Hives diffusely on body after vaginal administration.     Azithromycin Dizziness and Rash     Cefuroxime Rash        SOCIAL HISTORY:  Works as a Pharmacy tech at [employer].  Lives  at home with .  Nonsmoker.  Rare EtOH.    Social History     Socioeconomic History     Marital status: Single     Number of children: 0   Occupational History     Occupation: Pharmacy Technician   Tobacco Use     Smoking status: Never Smoker     Smokeless tobacco: Never Used   Vaping Use     Vaping Use: Never used   Substance and Sexual Activity     Alcohol use: No     Alcohol/week: 0.0 standard drinks     Drug use: No     Sexual activity: Yes     Partners: Male   Social History Narrative    Date of Service:5/15/2013     Name: Mona VALDOVINOS (Month, Day, Year of birth): 1990     MRN: [id number]     New Patient: Yes    Preferred Language: English     Needed: No    County of Residence: [place]    Marital Status:     Household size: 8    Number of Dependents:0     Pregnant: 0    Average Monthly Income: $ 600    Citizenship Status: Citizen    Gave Pt [employer] and [employer] applications       FAMILY HISTORY:  No family history of problems with bleeding, blood clots, or anesthesia.  Mother with salivary gland malignancy.    Family History   Problem Relation Age of Onset     Kidney Disease Mother      Kidney failure Mother      Coronary Artery Disease Father      Cerebrovascular Disease Father      Heart Disease Father      Sleep Apnea Father      Hypertension Sister      Diabetes Sister      Cerebrovascular Disease Sister      Kidney Disease Sister      Breast Cancer No family hx of      Cancer - colorectal No family hx of      Ovarian Cancer No family hx of      Prostate Cancer No family hx of      Other Cancer No family hx of      Asthma No family hx of      Anesthesia Reaction No family hx of      Deep Vein Thrombosis (DVT) No family hx of      Melanoma No family hx of      Skin Cancer No family hx of         PHYSICAL EXAM:  Vital Signs: /72 (BP Location: Right arm, Patient Position: Sitting, Cuff Size: Adult Regular)   Pulse 86   Ht 1.524 m (5')   Wt 70.5 kg (155 lb 6.4 oz)    SpO2 98%   BMI 30.35 kg/m    Body mass index is 30.35 kg/m .     General: Well-appearing woman sitting in NAD, appears stated age, AAOx4  HEENT: NCAT; MMM  Neck: Supple, trachea midline  CV: RRR, WWP  Lungs: Breathing unlabored  Abdomen: Nondistended  Lymph nodes: Right axillary lymph node quite cephalad/distal on armpit/on arm, only palpable when pt's arm above head but superficial and easily palpable in this position, approximately 1 cm, nontender, mobile. No left axillary lymphadenopathy, no supraclavicular or neck lymphadenopathy in anterior/posterior chains.  Extremities: no edema, left AVF in forearm  Neuro: Moving all extremities spontaneously without apparent deficit, facial movement grossly symmetric, EOMI, sensation intact to light touch, hearing intact to conversation  Psych: Appropriate affect, congruent mood      PERTINENT IMAGING/TESTING:  EXAM: US UPPER EXTREMITY NON VASCULAR RIGHT  LOCATION: [facility]  DATE/TIME: 7/6/2022 12:01 PM     INDICATION: 3 years of non growing lesion in R axillary vault, new mild tenderness today  COMPARISON: None.  TECHNIQUE: Routine.     FINDINGS: Targeted ultrasound of the right axillary region of clinical concern was performed. Within this region there is an ill-defined 1.4 x 0.8 x 1.3 cm hyperechoic solid structure with vascular flow, perhaps a lymph node with a prominent fatty hilum.                                                                   IMPRESSION:  1.  Indeterminate but likely benign 1.4 cm solid nodule within the right axillary region of clinical concern. Differential consideration includes a lymph node with a prominent fatty hilum. Recommend ultrasound follow-up in 3-6 months or sooner if   progressive enlargement is noted on clinical exam or by the patient.    LABORATORY TESTING:  Recent Labs   Lab Test 08/02/22  1032   WBC 4.2   HGB 12.0          Recent Labs   Lab Test 08/02/22  1032 06/22/22  0639 06/01/22  1020  03/17/21  0926 03/03/21  0912      < > 138   < > 136   POTASSIUM 4.4   < > 4.6   < > 4.4   CHLORIDE 110*   < > 109*   < > 107   CO2 24   < > 22   < > 24   BUN 17   < > 25*   < > 23   CR 0.85   < > 0.82   < > 0.92   GLC 89   < > 88   < > 92   SHAMIR 10.7*   < > 10.3   < > 9.8   MAG  --   --   --   --  1.6   PHOS  --   --  2.1*   < >  --     < > = values in this interval not displayed.       Recent Labs   Lab Test 06/22/22  0639 01/23/20  1301 08/30/19  2210   AST 16   < > <3   ALT 10   < > 16   ALKPHOS 110   < > 167*   BILITOTAL 0.9   < > 0.3   ALBUMIN 4.4   < > 3.9   INR  --   --  0.95    < > = values in this interval not displayed.        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 Excision is recommended as it has not regressed in size and she has increased r/o malignancy with transplant hx, though suspicion for malignancy is still generally low.    DISCUSSION OF RISKS:  Today, I discussed the risks, benefits, and alternatives to surgery, including but not limited to bleeding, infection, damage to surrounding structures including muscles and nerves and vessels. I discussed the low risk of lymphedema or lymphatic leak. I discussed likely numbness at the site of surgery that may or may improve. I discussed that the patient may not have a cosmetically appealing result, though every attempt will be made for this. I discussed an alternative of watchful waiting or core bx or FNA. The patient expressed understanding and would like to proceed with surgery.     PLAN:   - Excision at [facility] [place], same day surgery, MAC/local  - PAC visit preop (of note pt had a reported traumatic experience getting inhaled anesthetic for a surgery before; encouraged her to discuss with Anesthetist)  - No need to hold ASA preoperatively  - Ok for right hand/forearm IV on day of  surgery as will position patient's right arm above head and prep the axilla/upper arm only    Orders Placed This Encounter   Procedures     PAC Visit Referral (For [facility] Only)       Sincerely,     Katherine Lunsford MD  General Surgery PGY-5    Discussed with staff surgeon Dr. Nicole.

## 2022-08-05 NOTE — LETTER
8/5/2022       RE: Mona Wagner  [address]  [place] [address]     Dear Colleague,    Thank you for referring your patient, Mona Wagner, to the [facility] [place] at [facility]. Please see a copy of my visit note below.    [facility] General Surgery Consultation Clinic Note        Mona Wagner  [id number]  1990 August 5, 2022     Requesting Provider: Baudilio Arellano     Dear Macarena Yin,     I had the pleasure of seeing your patient, Mona Wagner.  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 She first noted it 3 years ago; it may have grown slightly but very little, and is not tender. She has not noticed any breast lumps (does self breast exams monthly) or other lymphadenopathy aside from a left groin bump she noticed around 1-2 years ago that went away after a few months. She is anxious about having this lymph node removed because her mother had a salivary gland malignancy, and she knows she is at increased risk of malignancy with her h/o transplant. She is nervous about risks of surgery, but does think she wants the node removed especially because she is thinking about getting pregnant soon so wants this taken care of ahead of time.    She was seen by Dr. Arellano of Dermatology on 7/13/2022. Ultrasound was done on 7/6/2022 showing a likely 1.4 cm mass with vascular flow, possibly lymph node.     A 10-point review of systems was negative except as noted in HPI.  ROS    PAST MEDICAL HISTORY:  DDKT  DVT (port-associated)    Past Medical History:   Diagnosis Date     Anemia in chronic kidney disease      History of  bacterial endocarditis      History of blood transfusion      History of DVT (deep vein thrombosis) 2014     History of seizure 2014     History of subarachnoid hemorrhage      Hydronephrosis      Hyperprolactinemia (H)      Hypertension     resolved after transplant     Hypothyroidism      Kidney transplanted 08/03/2019    DCD DDKT. Induction with thymo 6mg/kg.     Orthostatic hypotension      FATOUMATA (obstructive sleep apnea)      Pyelonephritis of transplanted kidney 01/23/2020     Secondary renal hyperparathyroidism (H)      Urinary tract infection         PAST SURGICAL HISTORY:  Left forearm AVF  Right HD port (2013, removed a few months later)    Past Surgical History:   Procedure Laterality Date     BENCH KIDNEY N/A 8/3/2019    Procedure: BACKBENCH PREPARATION, ALLOGRAFT, KIDNEY;  Surgeon: Dorian Johnson MD;  Location: [facility] OR     COMBINED CYSTOSCOPY, RETROGRADES, EXCHANGE STENT URETER(S) Right 11/23/2020    Procedure: CYSTOSCOPY, CYSOTGRAM, WITH RETROGRADE PYELOGRAM, ureteroscopy,  AND URETERAL STENT;  Surgeon: Wally Britt MD;  Location: [facility] OR     COMBINED CYSTOSCOPY, RETROGRADES, URETEROSCOPY, LASER HOLMIUM LITHOTRIPSY URETER(S), INSERT STENT N/A 12/6/2019    Procedure: CYSTOURETEROSCOPY, WITH RETROGRADE PYELOGRAM of transplant kidney, STENT INSERTION, Laser on standby;  Surgeon: Wally Britt MD;  Location: [facility] OR     CREATE FISTULA ARTERIOVENOUS UPPER EXTREMITY  1/2/2014    Procedure: CREATE FISTULA ARTERIOVENOUS UPPER EXTREMITY;  Left Wrist Arteriovenous Fistula Placement;  Surgeon: Shashi Castro MD;  Location: [facility] OR     CREATE FISTULA ARTERIOVENOUS UPPER EXTREMITY       CYSTOSCOPY, RETROGRADES, INSERT STENT URETER(S), COMBINED N/A 8/20/2020    Procedure: CYSTOSCOPY, WITH RETROGRADE PYELOGRAM, CYSTOGRAM AND URETERAL STENT INSERTION - TRANSPLANT KIDNEY;  Surgeon: Wally Britt MD;  [facility] OR     IR CEREBRAL ANGIOGRAM  9/17/2014     PERCUTANEOUS BIOPSY KIDNEY Right 9/17/2019     Procedure: Right Kidney Biopsy;  Surgeon: Deny Rios MD;  Location: [facility] OR     RASTA/DIALYSIS CATHETER  12/10/2013          TRANSPLANT KIDNEY RECIPIENT  DONOR N/A 8/3/2019    Procedure: TRANSPLANT, KIDNEY, RECIPIENT,  DONOR with Ureteral Stent Placement;  Surgeon: Dorian Johnson MD;  Location: [facility] OR        MEDICATIONS:  ASA 81mg  Immunosuppression as below for DDKT    Current Outpatient Medications   Medication     acetaminophen (TYLENOL) 325 MG tablet     aspirin (ASA) 81 MG chewable tablet     atorvastatin (LIPITOR) 10 MG tablet     azaTHIOprine (IMURAN) 50 MG tablet     diphenhydrAMINE (BENADRYL) 25 MG capsule     EPINEPHrine (ANY BX GENERIC EQUIV) 0.3 MG/0.3ML injection 2-pack     famotidine (PEPCID) 20 MG tablet     fludrocortisone (FLORINEF) 0.1 MG tablet     hydrOXYzine (ATARAX) 10 MG tablet     levothyroxine (SYNTHROID/LEVOTHROID) 25 MCG tablet     minoxidil (ROGAINE) 5 % external solution     norethindrone (MICRONOR) 0.35 MG tablet     polyethylene glycol (MIRALAX) 17 GM/Dose powder     Prenatal Vit-Fe Fumarate-FA (PRENATAL MULTIVITAMIN W/IRON) 27-0.8 MG tablet     Prenatal Vit-Fe Fumarate-FA (PRENATAL VITAMINS) 28-0.8 MG TABS     senna-docusate (SENOKOT-S/PERICOLACE) 8.6-50 MG tablet     simethicone (MYLICON) 125 MG chewable tablet     tacrolimus (GENERIC EQUIVALENT) 0.5 MG capsule     tacrolimus (GENERIC EQUIVALENT) 1 MG capsule     cyclobenzaprine (FLEXERIL) 5 MG tablet     sennosides (SENOKOT) 8.6 MG tablet     sodium chloride (OCEAN) 0.65 % nasal spray     No current facility-administered medications for this visit.        ALLERGIES:  Allergies   Allergen Reactions     Contrast Dye Rash     CT contrast allergy 19 rash over eyes. Need to have pre medication before a CT WITH CONTRAST      Lisinopril Swelling     angioedema     Nitrous Oxide Other (See Comments)     Sense of doom     Chlorhexidine Rash     Rash at site     Sulfa Drugs Rash     Muscle stiffness of  neck     Dapsone      Methemoglobinemia     Furosemide Other (See Comments)     Skin flushing     Metrogel [Metronidazole]      Hives diffusely on body after vaginal administration.     Azithromycin Dizziness and Rash     Cefuroxime Rash        SOCIAL HISTORY:  Works as a Pharmacy tech at [employer].  Lives at home with .  Nonsmoker.  Rare EtOH.    Social History     Socioeconomic History     Marital status: Single     Number of children: 0   Occupational History     Occupation: Pharmacy Technician   Tobacco Use     Smoking status: Never Smoker     Smokeless tobacco: Never Used   Vaping Use     Vaping Use: Never used   Substance and Sexual Activity     Alcohol use: No     Alcohol/week: 0.0 standard drinks     Drug use: No     Sexual activity: Yes     Partners: Male   Social History Narrative    Date of Service:5/15/2013     Name: Mona VALDOVINOS (Month, Day, Year of birth): 1990     MRN: [id number]     New Patient: Yes    Preferred Language: English     Needed: No    County of Residence: [place]    Marital Status:     Household size: 8    Number of Dependents:0     Pregnant: 0    Average Monthly Income: $ 600    Citizenship Status: Citizen    Gave Pt [employer] and [employer] applications       FAMILY HISTORY:  No family history of problems with bleeding, blood clots, or anesthesia.  Mother with salivary gland malignancy.    Family History   Problem Relation Age of Onset     Kidney Disease Mother      Kidney failure Mother      Coronary Artery Disease Father      Cerebrovascular Disease Father      Heart Disease Father      Sleep Apnea Father      Hypertension Sister      Diabetes Sister      Cerebrovascular Disease Sister      Kidney Disease Sister      Breast Cancer No family hx of      Cancer - colorectal No family hx of      Ovarian Cancer No family hx of      Prostate Cancer No family hx of      Other Cancer No family hx of      Asthma No family hx of      Anesthesia Reaction No family  hx of      Deep Vein Thrombosis (DVT) No family hx of      Melanoma No family hx of      Skin Cancer No family hx of         PHYSICAL EXAM:  Vital Signs: /72 (BP Location: Right arm, Patient Position: Sitting, Cuff Size: Adult Regular)   Pulse 86   Ht 1.524 m (5')   Wt 70.5 kg (155 lb 6.4 oz)   SpO2 98%   BMI 30.35 kg/m    Body mass index is 30.35 kg/m .     General: Well-appearing woman sitting in NAD, appears stated age, AAOx4  HEENT: NCAT; MMM  Neck: Supple, trachea midline  CV: RRR, WWP  Lungs: Breathing unlabored  Abdomen: Nondistended  Lymph nodes: Right axillary lymph node quite cephalad/distal on armpit/on arm, only palpable when pt's arm above head but superficial and easily palpable in this position, approximately 1 cm, nontender, mobile. No left axillary lymphadenopathy, no supraclavicular or neck lymphadenopathy in anterior/posterior chains.  Extremities: no edema, left AVF in forearm  Neuro: Moving all extremities spontaneously without apparent deficit, facial movement grossly symmetric, EOMI, sensation intact to light touch, hearing intact to conversation  Psych: Appropriate affect, congruent mood      PERTINENT IMAGING/TESTING:  EXAM: US UPPER EXTREMITY NON VASCULAR RIGHT  LOCATION: [facility]  DATE/TIME: 7/6/2022 12:01 PM     INDICATION: 3 years of non growing lesion in R axillary vault, new mild tenderness today  COMPARISON: None.  TECHNIQUE: Routine.     FINDINGS: Targeted ultrasound of the right axillary region of clinical concern was performed. Within this region there is an ill-defined 1.4 x 0.8 x 1.3 cm hyperechoic solid structure with vascular flow, perhaps a lymph node with a prominent fatty hilum.                                                                   IMPRESSION:  1.  Indeterminate but likely benign 1.4 cm solid nodule within the right axillary region of clinical concern. Differential consideration includes a lymph node with a prominent fatty  hilum. Recommend ultrasound follow-up in 3-6 months or sooner if   progressive enlargement is noted on clinical exam or by the patient.    LABORATORY TESTING:  Recent Labs   Lab Test 08/02/22  1032   WBC 4.2   HGB 12.0          Recent Labs   Lab Test 08/02/22  1032 06/22/22  0639 06/01/22  1020 03/17/21  0926 03/03/21  0912      < > 138   < > 136   POTASSIUM 4.4   < > 4.6   < > 4.4   CHLORIDE 110*   < > 109*   < > 107   CO2 24   < > 22   < > 24   BUN 17   < > 25*   < > 23   CR 0.85   < > 0.82   < > 0.92   GLC 89   < > 88   < > 92   SHAMIR 10.7*   < > 10.3   < > 9.8   MAG  --   --   --   --  1.6   PHOS  --   --  2.1*   < >  --     < > = values in this interval not displayed.       Recent Labs   Lab Test 06/22/22  0639 01/23/20  1301 08/30/19  2210   AST 16   < > <3   ALT 10   < > 16   ALKPHOS 110   < > 167*   BILITOTAL 0.9   < > 0.3   ALBUMIN 4.4   < > 3.9   INR  --   --  0.95    < > = values in this interval not displayed.        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 Excision is recommended as it has not regressed in size and she has increased r/o malignancy with transplant hx, though suspicion for malignancy is still generally low.    DISCUSSION OF RISKS:  Today, I discussed the risks, benefits, and alternatives to surgery, including but not limited to bleeding, infection, damage to surrounding structures including muscles and nerves and vessels. I discussed the low risk of lymphedema or lymphatic leak. I discussed likely numbness at the site of surgery that may or may improve. I discussed that the patient may not have a cosmetically appealing result, though every attempt will be made for this. I discussed an alternative of watchful waiting or core bx or FNA. The patient expressed understanding and would like to proceed with  surgery.     PLAN:   - [place] [facility] [place], MAC/local  - PAC visit preop (of note pt had a reported traumatic experience getting inhaled anesthetic for a surgery before; encouraged her to discuss with Anesthetist)  - No need to hold ASA preoperatively  - Ok for right hand/forearm IV on day of surgery as will position patient's right arm above head and prep the axilla/upper arm only    Orders Placed This Encounter   Procedures     PAC Visit Referral (For [facility] Only)       Sincerely,     Katherine Lunsford MD  General Surgery PGY-5    Discussed with staff surgeon Dr. Nicole.     Attestation signed by Seb Nicole MD at 8/5/2022  1:00 PM:   I saw and evaluated the patient in clinic today- I agree with the note as written.  I reviewed her imaging (an US of the right axilla), which shows an enlarged nodule, presumed lymph node.  This is a palpable lesion, located laterally.    We discussed observation, FNA/Core biopsy vs excision. Discussed the risks of lymphedema, lymphocele, infection, injury to artery/vein/nerve.  We decided to proceed with excisional biopsy with local/MAC      To OR for excision right axillary nodule.      Sincerely,    Seb Nicole MD

## 2022-08-05 NOTE — NURSING NOTE
Pre and Post op Patient Education/Teaching Flowsheet  Relevant Diagnosis:  Right axillary lymph node  Teaching Topic:  Pre and post op teaching  Person(s) Involved in teaching:  Patient     Motivation Level:  Asks Questions:  Yes  Eager to Learn:  Yes  Cooperative:  Yes  Receptive (willing/able to accept information):  Yes  Any cultural factors/Protestant beliefs that may influence understanding or compliance?  No    Patient/caregiver/family demonstrates understanding of the following:  Reason for the appointment, diagnosis, and treatment plan:  Yes  Patient demonstrates understanding of the following:  Pre-op bowel prep:  N/A  Post-op pain management recommendations (medications, ice compress, binder/athletic supporter (if applicable), etc.:  Yes  Inguinal hernia patients:  Post-op urinary retention- discussed signs/symptoms and visit to ER for Ramirez catheter placement and to stay in place for at least 48 hours:  NA  Restrictions:  Yes  Medications to take the day of surgery:  Per PCP  Blood thinner medications discussed and when to stop (if applicable):  Yes  Wound care:  Yes  Diabetes medication management (if applicable):  Per PCP  Which situations necessitate calling provider and whom to contact:  Discussed how to contact the hospital, nurse, and clinic scheduling staff if necessary      Date and time of surgery:  TBD  Location of surgery: [facility] [place]- 5th Floor  History and Physical and any other testing necessary prior to surgery:  Yes  Required time line for completion of History and Physical and any pre-op testing:  Yes  Discuss need for someone to drive patient home and stay with them for 24 hours:  Yes  Pre-op showering/scrub information with Surgical Scrub:  Yes  NPO Guidelines:  NPO per Anesthesia Guidelines  COVID-19 Testing:  Yes    Infection Prevention: Patient demonstrates understanding of the following:  Patient instructed on hand hygiene:  Yes  Surgical procedure  site care will be taught and will be reviewed at the time of discharge  Signs and symptoms of infection taught:  Yes  Wound care reviewed and will be taught at the time of discharge  Central venous catheter care will be taught at the time of discharge (if applicable)    Post-op follow-up:  Instructional materials used/given/mailed:  Surgical logistics, post op teaching sheet, and surgical scrub

## 2022-08-05 NOTE — PATIENT INSTRUCTIONS
You met with Dr. Seb Nicole.      Today's visit instructions:    Reminder: Surgery Requirements  Your surgery will be at [facility] [place]- 5th Floor  You will need to arrive 1.5 hours early.  You will need someone to drive you home (over 18 years old) and stay with you for 24 hours after the procedure.  You will need a preop physical with PAC (pre-anesthesia care) within 30 days of surgery- closer is always better.  Stop any blood thinners, vitamins, minerals, or herbal supplements 5 days before surgery.  If you are taking a prescribed blood thinner please let us know for specific instructions.  Fasting- a nurse from [facility] will call you 1-2 days before surgery to confirm your procedure and tell you when to stop eating and drinking.   Wash with the soap (Antibacterial, Dial Complete Foam, Hibiclense, or soap given/mailed from the clinic) the night before surgery and morning of surgery. See instructions in the Surgery Packet.  You will need to undergo a COVID-19 test 2-4 days prior to your scheduled procedure.  Please see the handout . Our surgery scheduler can help arrange testing if you do not have a home test.  If you would like a procedure estimate please call Cost of Care at [phone].       If you have questions please contact Janee RN or Jayla RN during regular clinic hours, Monday through Friday 7:30 AM - 4:00 PM, or you can contact us via [employer] at anytime.       If you have urgent needs after-hours, weekends, or holidays please call the hospital at [phone] and ask to speak with our on-call General Surgery Team.    Appointment [phone]  Nurse Advice (Janee or Jayla): [phone]   Surgery Scheduler (Shivam): [phone]  Fax: [phone]      After Your Lymph Node Removal      Incision care   You may take a shower the day after surgery. Carefully wash your incision with soap and water. Do not submerge yourself in water (bath, whirlpool, hot tub,  pool, lake) for 14 days after surgery.   Remove the gauze bandage 24 hours after surgery, but leave the medical tape (Steri-Strips) or glue in place. These will loosen and fall off on their own 1-2 weeks after surgery.    Always wash your hands before touching your incisions or removing bandages.   It is not unusual to form a collection of fluid or blood under your incision that may feel firm or squishy- it can take several weeks to months for your body to reabsorb it.  At times, it may even drain.  If that should happen keep the area clean with soap, water,  and cover with a clean gauze dressing. You can change this daily or as needed.     Other medicines   Wait to start aspirin or blood thinners until the day after surgery. You can continue your regular medicines at your normal time the day after surgery.   Your pain medicine may cause constipation (hard, dry stools). To help with this, take the stool softener your doctor gave you or an over-the-counter stool softener or laxative. You can stop taking this when you are no longer taking pain medicine and your bowel movements are back to normal.      For pain or discomfort   Take the narcotic pain medicine your doctor gave you as needed and as instructed on the bottle. If you prefer to use over-the-counter medication, use acetaminophen (Tylenol) or ibuprofen (Advil, Motrin) as instructed on the box. Do not take Tylenol if it is in your narcotic pain medication.    Use an ice pack on your surgical cut (incision) for 20 minutes at a time as needed for the first 24 hours. Be sure to protect your skin by putting a cloth between the ice pack and your skin.      Activities   No driving until you feel it s safe to do so. Don t drive while taking narcotic pain medicine.      Diet   You can eat your regular meals after surgery.      Results   You should know any test results about 5-7 business days after surgery from your referring provider.     When to call the doctor   Call  your doctor if you have:   A fever above 101 F (38.3 C) (taken under the tongue), or a fever or chills lasting more than a day.   Redness at the incision site.   Any fluid or blood draining from the incision, especially if it smells bad.    Severe pain that doesn t improve with pain medicine.      We will call you 2 to 4 days after surgery to review this handout, answer questions and help arrange after-surgery care. If you have questions or concerns, please call [phone] during regular office hours. If you need to call after business hours, call [phone] and ask to page the surgeon on-call.

## 2022-08-09 ENCOUNTER — PATIENT OUTREACH (OUTPATIENT)
Dept: SURGERY | Facility: CLINIC | Age: 32
End: 2022-08-09

## 2022-08-09 NOTE — PROGRESS NOTES
Called patient to help arrange excision of a right axillary mass.  After her discussion with Dr. Nicole and the potential risks of surgery, she would like to monitor the area up to ~6 months to determine if there is any change.  She plans to contact her renal provider and PCP to further discuss her decision.    She will contact our office if she would like to schedule excision in the future.      ASC/MAC  PAC  90 minutes

## 2022-08-09 NOTE — LETTER
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 U/S 8/8 without hydro and patent vasculature and Renogram 8/10 consistent with ATN. UOP continues to improve and creatinine has trended down nicely to 9.6 from 10.8. Anticipate ongoing improvement in graft function, however, given hyponatremia, will plan for iHD today.   - Baseline Cr ~ TBD;              - Proteinuria: Not checked post transplant              - Date DSA Last Checked: 8/2/2019        DSA: No   - BK Viremia: Not checked post transplant   - Kidney Transplant Biopsy: No     # Immunosuppression: Tacrolimus immediate release (goal 8-10) and Mycophenolate mofetil (goal 1-3.5)              - Changes: Will continue to adjust Tac pending levels.      # Prophylaxis:              - PJP: no bactrim due to allergy, no dapsone due to methemoglobinemia; plan for inhaled pentamidine today.              - CMV: Valcyte 450 q M-Th - will monitor eGFR and improvement in graft function to adjust dose as needed     # Hypertension: Controlled/now orthostatic; Goal BP: < 150/90              - Volume status:  Hypervolemic - but weight down 1 kg today, ~ 4kg above EDW of 53 kg              - Changes: Yes, reduce Norvasc to 5mg qhs and continue BID Bumex 4mg     # Anemia in Chronic Renal Disease: Hgb:  Increased to 8.7         CHARLINE: No   -Iron Studies: Replete, will continue to trend Hgb     # Mineral Bone Disorder:   - Secondary renal hyperparathyroidism; PTH level is elevated at 358 pg/mL              - Vitamin D level; low, cont cholecal 2000 units daily.   - Calcium; level: Mildly elevated, will trend              - Hyperphosphatemia: On sevelamer with meals, will continue with DGF - anticipate she can stop these in the next 24-48 hrs    # Electrolytes   -Potassium; level: normal   -Magnesium; level: normal   -Bicarbonate; level: normal   -Sodium; level: low  due to renal dysfunction/impaired free H2) excretion. Will plan for RRT today.     # Leukocytosis: WBC elevated, but all three cell lines up quite a bit - will trend. No fevers/chills, cough. Dysuria is improving, suspect related to previous brooks catheter, UCx from  and  negative.     # Hypothyroidism: Last TSH 2.2. Continue on LevoT.    # Hyperprolactinemia: trending prolactins, no plans for surgical intervention per patient, plan for ongoing monitoring with endocrine    # Anxiety: Improving    #Hx of Sub Arachnoid Hemorrhage while on Coumadin: No acute issues     # Transplant History:  Etiology of kidney failure: IgA nephropathy  Tx: DDKT  Transplant: 8/3/2019 (Kidney)  Donor Type:  - Cardiac Death        Donor Class: Standard Criteria Donor  Crossmatch at time of Tx: negative  Significant changes in immunosuppression: None  Significant transplant-related complications: None    Transplant Office Phone Number: [phone]    Assessment and plan was discussed with the patient and she voiced her understanding and agreement.    Return visit: Return in about 1 day (around 2019).     Pt staffed with Dr. Sergio Horowitz MD   [id number]    Attestation:  This patient has been seen and evaluated by me, Nakul Hill MD.  I have reviewed the note and agree with plan of care as documented by the fellow.       Chief Complaint   Ms. Wagner is a 28 year old here for hospital follow up following kidney transplant. 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 She has had increasing UOP, decreasing weight, improving appetite, and improving energy level. No hematuria, and some slight improvement in her dysuria.      Recent Hospitalizations:  [] No [x] Yes  DDKT   New Medical Issues: [] No [x] Yes DDKT   Decreased energy: [] No [x] Yes Improving   Chest pain or SOB with exertion:  [x] No [] Yes    Appetite change or weight change: [] No [x] Yes Appetite improving   Nausea, vomiting or diarrhea:  [x] No [] Yes    Fever, sweats or chills: [x] No [] Yes    Leg swelling: [x] No [] Yes Essentially resolved     Home BP: Not checked    Review of Systems   A comprehensive review of systems was obtained and negative, except as noted in the HPI or PMH.    Problem List   Patient Active Problem List   Diagnosis     DVT of upper extremity (deep vein thrombosis) (H)     Seizure disorder (H)     HTN, kidney transplant related     Other general symptoms(780.99)     Galactorrhea     Acquired hypothyroidism     Anemia in chronic kidney disease     Increased prolactin level (H)     Normocytic anemia     Thrombocytopenia (H)     Infective endocarditis     Aftercare following organ transplant     Immunosuppression (H)     Delayed graft function of kidney     Methemoglobinemia     Kidney replaced by transplant     Anxiety     Secondary renal hyperparathyroidism (H)     Vitamin D deficiency     Acute kidney failure, unspecified (H)       Social History   Social History     Tobacco Use     Smoking status: Never Smoker     Smokeless tobacco: Never Used   Substance Use Topics     Alcohol use: No     Alcohol/week: 0.0 oz     Drug use: No       Allergies   Allergies   Allergen Reactions     Chlorhexidine Rash     Rash at site     Sulfa Drugs Rash     Muscle stiffness of neck     Dapsone      Methemoglobinemia     Furosemide Other (See Comments)     Skin flushing     Lisinopril Swelling     angioedema     Alcohol Swabs [Isopropyl Alcohol] Rash       Medications   Current Outpatient Medications   Medication Sig     acetaminophen (TYLENOL) 325 MG tablet Take 2 tablets (650 mg) by mouth every 4 hours as needed for mild pain     amLODIPine (NORVASC) 10 MG tablet Take 1 tablet (10 mg) by mouth daily      aspirin (ASA) 81 MG chewable tablet Take 1 tablet (81 mg) by mouth daily     atorvastatin (LIPITOR) 10 MG tablet Take 1 tablet (10 mg) by mouth daily     bumetanide (BUMEX) 2 MG tablet Take 2 tablets (4 mg) by mouth 2 times daily for 5 days     diphenhydrAMINE (BENADRYL) 25 MG tablet Take 1-2 tablets (25-50 mg) by mouth every 6 hours as needed for itching or allergies     docusate sodium (COLACE) 100 MG tablet Take 1 tablet (100 mg) by mouth daily as needed for constipation     EPINEPHrine (EPIPEN/ADRENACLICK/OR ANY BX GENERIC EQUIV) 0.3 MG/0.3ML injection 2-pack Inject 0.3 mLs (0.3 mg) into the muscle as needed for anaphylaxis     hydrOXYzine (ATARAX) 10 MG tablet Take 1 tablet (10 mg) by mouth 3 times daily as needed for anxiety     levothyroxine (SYNTHROID/LEVOTHROID) 25 MCG tablet Take 1 tablet (25 mcg) Tuesday, Thursday, Saturday and Sunday and 1.5 tablets (37.5 mcg) on Monday, Wednesday and Friday every week.     multivitamin RENAL (NEPHROCAPS/TRIPHROCAPS) 1 MG capsule Take 1 capsule by mouth daily     mycophenolate (GENERIC EQUIVALENT) 250 MG capsule Take 3 capsules (750 mg) by mouth 2 times daily     norethindrone (MICRONOR) 0.35 MG tablet Do not restart until your follow up appointment with your surgeon. Discuss restart date at that appointment. (Patient not taking: Reported on 8/13/2019)     ondansetron (ZOFRAN ODT) 4 MG ODT tab Take 1-2 tablets (4-8 mg) by mouth every 8 hours as needed for nausea     oxyCODONE (ROXICODONE) 5 MG tablet Take 0.5 tablets (2.5 mg) by mouth every 8 hours as needed for breakthrough pain     pentamidine (NEBUPENT) 300 MG neb solution Inhale 300 mg into the lungs every 28 days     polyethylene glycol (MIRALAX/GLYCOLAX) powder Take 17 g (1 capful) by mouth daily as needed for constipation     sevelamer (RENVELA) 800 MG tablet Take 1600 mg by mouth three times daily with meals AND Take 800 mg by mouth with snacks.     simethicone (MYLICON) 125 MG chewable tablet Take 1 tablet  (125 mg) by mouth 4 times daily as needed for intestinal gas     tacrolimus (GENERIC EQUIVALENT) 0.5 MG capsule Take 1 cap by mouth twice daily as directed by Transplant Center for dose adjustment (Patient not taking: Reported on 8/13/2019)     tacrolimus (GENERIC EQUIVALENT) 1 MG capsule Take 5 capsules (5 mg) by mouth 2 times daily     valGANciclovir (VALCYTE) 450 MG tablet Take 1 tab every Monday and Thursday. Titrate dose up to a max of 2 tabs (900 mg) by mouth daily when directed by your transplant team.     vitamin D3 (CHOLECALCIFEROL) 2000 units (50 mcg) tablet Take 1 tablet (2,000 Units) by mouth daily     No current facility-administered medications for this visit.      Facility-Administered Medications Ordered in Other Visits   Medication     pentamidine (NEBUPENT) neb solution 300 mg     There are no discontinued medications.    Physical Exam   Vital Signs: Reviewed    GENERAL APPEARANCE: alert and no distress  HENT: mouth without ulcers or lesions  LYMPHATICS: no cervical nodes  RESP: lungs clear to auscultation - no wheezes  CV: regular rhythm, normal rate  EDEMA: trace LE edema bilaterally  ABDOMEN: soft, nondistended, nontender, bowel sounds normal  MS: extremities normal - no gross deformities noted  SKIN: no rash  TX KIDNEY: Well healed incision  DIALYSIS ACCESS: LUE AVF with good thrill    Data     Renal Latest Ref Rng & Units 8/15/2019 8/14/2019 8/13/2019   Na 133 - 144 mmol/L 124(L) 127(L) 128(L)   Na (external) 134 - 145 mEq/L - - -   K 3.4 - 5.3 mmol/L 4.0 3.8 3.8   K (external) 3.5 - 5.1 mEq/L - - -   Cl 94 - 109 mmol/L 90(L) 94 96   Cl (external) 98 - 107 mEq/L - - -   CO2 20 - 32 mmol/L 24 25 23   CO2 (external) 21 - 31 mEq/L - - -   BUN 7 - 30 mg/dL 85(H) 82(H) 72(H)   BUN (external) mg/dL - - -   Cr 0.52 - 1.04 mg/dL 9.66(H) 10.80(H) 10.70(H)   Cr (external) 0.60 - 1.20 mg/dL - - -   Glucose 70 - 99 mg/dL 137(H) 118(H) 128(H)   Ca  8.5 - 10.1 mg/dL 10.3(H) 9.8 9.5   Ca (external) 8.6 -  10.2 mg/dL - - -   Mg 1.6 - 2.3 mg/dL 2.0 2.0 2.1     Bone Health Latest Ref Rng & Units 8/15/2019 8/14/2019 8/13/2019   Phos 2.5 - 4.5 mg/dL 6.0(H) 6.7(H) 6.6(H)   Phos (external) 2.5 - 6.0 mg/dL - - -   PTHi 18 - 80 pg/mL - - -   PTHi (external) 18 - 80 pg/mL - - -   Vit D Def 20 - 75 ug/L - - -     Heme Latest Ref Rng & Units 8/15/2019 8/14/2019 8/13/2019   WBC 4.0 - 11.0 10e9/L 12.2(H) 10.6 9.3   WBC (external) 4.5 - 11.0 x10:3/uL - - -   Hgb 11.7 - 15.7 g/dL 8.7(L) 7.3(L) 8.2(L)   Hgb (external) 12.0 - 16.0 g/dL - - -   Plt 150 - 450 10e9/L 329 239 198   Plt (external) 150 - 400 x10:3/uL - - -     Liver Latest Ref Rng & Units 8/2/2019 1/15/2019 1/6/2019   AP 40 - 150 U/L 65 - 63   AP (external) 34 - 104 U/L - - -   TBili 0.2 - 1.3 mg/dL 0.8 0.9 0.7   ALT 0 - 50 U/L 13 - 14   ALT (external) 7 - 52 U/L - - -   AST 0 - 45 U/L 4 - 8   AST (external) 13 - 39 U/L - - -   Tot Protein 6.8 - 8.8 g/dL 8.6 7.3 7.5   Tot Protein (external) 6.4 - 8.9 g/dL - - -   Albumin 3.4 - 5.0 g/dL 4.2 3.5 3.5   Albumin (external) - - - -     Pancreas Latest Ref Rng & Units 8/3/2019 8/2/2019   A1C 0 - 5.6 % 4.8 4.8     Iron studies Latest Ref Rng & Units 8/12/2019/2019 7/9/2018 12/29/2016   Iron 35 - 180 ug/dL 96 - 137   Iron sat 15 - 46 % 59(H) - 60(H)   Ferritin 12 - 150 ng/mL - - 1,039(H)   Ferritin (external) 10 - 291 ng/mL - 1,557(H) -     [facility] Txp Virology 12/12/2013 12/11/2013   Hep B Surf 34.4 30.6        Recent Labs   Lab Test 08/11/19  0606 08/13/19  0800 08/14/19  0746   DOSTAC Not Provided Not Provided Not Provided   TACROL 5.1 6.1 6.5             Nakul Hill MD       Interpolation Flap Text: A decision was made to reconstruct the defect utilizing an interpolation axial flap and a staged reconstruction.  A telfa template was made of the defect.  This telfa template was then used to outline the interpolation flap.  The donor area for the pedicle flap was then injected with anesthesia.  The flap was excised through the skin and subcutaneous tissue down to the layer of the underlying musculature.  The interpolation flap was carefully excised within this deep plane to maintain its blood supply.  The edges of the donor site were undermined.   The donor site was closed in a primary fashion.  The pedicle was then rotated into position and sutured.  Once the tube was sutured into place, adequate blood supply was confirmed with blanching and refill.  The pedicle was then wrapped with xeroform gauze and dressed appropriately with a telfa and gauze bandage to ensure continued blood supply and protect the attached pedicle.

## 2022-08-10 ENCOUNTER — VIRTUAL VISIT (OUTPATIENT)
Dept: ENDOCRINOLOGY | Facility: CLINIC | Age: 32
End: 2022-08-10
Payer: MEDICARE

## 2022-08-10 DIAGNOSIS — N25.81 SECONDARY RENAL HYPERPARATHYROIDISM (H): ICD-10-CM

## 2022-08-10 DIAGNOSIS — N18.5 CHRONIC KIDNEY DISEASE, STAGE 5 (H): ICD-10-CM

## 2022-08-10 DIAGNOSIS — N64.3 GALACTORRHEA: ICD-10-CM

## 2022-08-10 DIAGNOSIS — E21.2 TERTIARY HYPERPARATHYROIDISM (H): ICD-10-CM

## 2022-08-10 DIAGNOSIS — Z79.890 HORMONE REPLACEMENT THERAPY: ICD-10-CM

## 2022-08-10 DIAGNOSIS — R79.89 INCREASED PROLACTIN LEVEL: ICD-10-CM

## 2022-08-10 DIAGNOSIS — E03.9 ACQUIRED HYPOTHYROIDISM: Primary | ICD-10-CM

## 2022-08-10 DIAGNOSIS — E06.3 HYPOTHYROIDISM DUE TO HASHIMOTO'S THYROIDITIS: ICD-10-CM

## 2022-08-10 DIAGNOSIS — Z94.0 KIDNEY REPLACED BY TRANSPLANT: ICD-10-CM

## 2022-08-10 PROCEDURE — 99215 OFFICE O/P EST HI 40 MIN: CPT | Mod: 95 | Performed by: INTERNAL MEDICINE

## 2022-08-10 RX ORDER — LEVOTHYROXINE SODIUM 25 UG/1
TABLET ORAL
Qty: 105 TABLET | Refills: 11 | Status: SHIPPED | OUTPATIENT
Start: 2022-08-10 | End: 2023-02-07

## 2022-08-10 NOTE — PATIENT INSTRUCTIONS
Nice to talk with you today in virtual clinic!    For now, please continue your current levothyroxine regime of 25 mcg x 4 days weekly + 37.5 mcg x 3 days weekly for total weekly dose of 212.5 mcg weekly (8.5 tabs weekly total)    If you become pregnant, add one extra dose of 37.5 mcg weekly & get to lab monthly & follow-up w/ ENDO monthly    Thyroid hormone replacement doses may change with significant body weight changes, so if this occurs, you will need thyroid labs sooner than once a year    For your labs, standing orders are in for monthly labs just in case . . .   If become pregnant, monthly TSH, FT4, otherwise quarterly if body weight changes by 30 lbs or more, or 1-2 times a year if weight stable and not pregnant    Always, 6-8 weeks after any dose changes in non-pregnant state . . . TSH, FT4 due    In future, please go to any [facility] lab.  Follow standard safety precautions for COVID-19. Practice social isolation, hand hygiene, do not touch your face, nose, or mouth, wear mask if have to go out in public to be respectful of community.  Please contact us to schedule at any of our [facility] lab locations. Call [phone] ([phone]), select option 1.    RTC: annual as needed based on results    Please use [facility] to schedule your appointment(s) or call [phone] to schedule in [facility]      Best Wishes,  Dr. Fidelina Robertson MD, MPH  Endocrinologist

## 2022-08-10 NOTE — PROGRESS NOTES
Diabetes & Endocrinology Clinic New Consult     Reason for visit/consult:   Follow-up for: hypothyroidism and hyperprolactinemia Dr. Coles patient - Dr. Coles left  Patient is scheduled in a follow-up patient time slot    Primary care provider:   Macarena Bowen MD      PCP - [facility] + 2 more     Since 1/18/2016     [phone] + 2 more     ASSESSMENT/PLAN:   Katlyn Wagner is 31 years old female patient with a past medical history of suspected IgA nephropathy and ESKD s/p kidney transplant 8/3/2019, history of hypothyroidism, hyperprolactinemia likely secondary to hypothyroidism and end-stage kidney disease, with a questionable microadenoma, in follow-up of her endocrine issues.      1. History of Galactorrhea and hyperprolactinemia (did not come up today, had renal transplant and prolactin has normalized>>> resolved  Galactorrhea has resolved and prolactin levels have been normal since her kidney transplant. Most recent PRL is juts above the normal range. The normalization of her PRL levels with treatment of hypothyroidism and normalization of her kidney function after her transplant suggests that the patient does not have a pituitary adenoma that is secreting prolactin, and that this was rather functional hyperprolactinemia, likely related to hypothyroidism and end-stage kidney disease.  - discussed that PRL levels are expected to increase with pregnancy   Latest Reference Range & Units 06/22/22 07:08   HCG Qualitative Serum Negative  Negative     - If there is evidence of a pituitary adenoma secreting prolactin in the future, we would consider treatment with cabergoline       Latest Reference Range & Units 06/22/22 06:39   Protein Total 6.0 - 8.0 g/dL 8.0     2. Hypothyroidism  Clinically euthyroid on current replacement doses.  -continue levothyroxine 25mcg 4x weekly and 37.5mcg 3x weekly  -If Mona becomes pregnant, she should increase levothyroxine by adding 37.5 mcg to  weekly dose, get to lab monthly, see Endo monthly for adjustments, standing orders are in     ENDO THYROID LABS-[facility] Latest Ref Rng & Units 7/13/2022   TSH 0.40 - 4.00 mU/L 1.74     ENDO THYROID LAB[facility] Latest Ref Rng & Units 7/13/2022   THYR PEROXIDASE BLAKE <35 IU/mL <10        ENDO THYROID LABS[facility] Latest Ref Rng & Units 4/22/2020   FREE T4 0.76 - 1.46 ng/dL 1.02     Return to clinic in 12 months for follow-up, sooner if the patient becomes pregnant     For HPI, see note of Dr. Coles from 11/16/21, reviewed    Past Medical/Surgical History:  Past Medical History:   Diagnosis Date     Anemia in chronic kidney disease      History of bacterial endocarditis      History of blood transfusion      History of DVT (deep vein thrombosis) 2014     History of seizure 2014     History of subarachnoid hemorrhage      Hydronephrosis      Hyperprolactinemia (H)      Hypertension     resolved after transplant     Hypothyroidism      Kidney transplanted 08/03/2019    DCD DDKT. Induction with thymo 6mg/kg.     Orthostatic hypotension      FATOUMATA (obstructive sleep apnea)      Pyelonephritis of transplanted kidney 01/23/2020     Secondary renal hyperparathyroidism (H)      Urinary tract infection      Past Surgical History:   Procedure Laterality Date     BENCH KIDNEY N/A 8/3/2019    Procedure: BACKBENCH PREPARATION, ALLOGRAFT, KIDNEY;  Surgeon: Dorian Johnson MD;  [facility]: [facility]     COMBINED CYSTOSCOPY, RETROGRADES, EXCHANGE STENT URETER(S) Right 11/23/2020    Procedure: CYSTOSCOPY, CYSOTGRAM, WITH RETROGRADE PYELOGRAM, ureteroscopy,  AND URETERAL STENT;  Surgeon: Wally Britt MD;  Location: [facility] OR     COMBINED CYSTOSCOPY, RETROGRADES, URETEROSCOPY, LASER HOLMIUM LITHOTRIPSY URETER(S), INSERT STENT N/A 12/6/2019    Procedure: CYSTOURETEROSCOPY, WITH RETROGRADE PYELOGRAM of transplant kidney, STENT INSERTION, Laser on standby;  Surgeon: Wally Britt MD;  [facility]     CREATE FISTULA ARTERIOVENOUS UPPER  EXTREMITY  2014    Procedure: CREATE FISTULA ARTERIOVENOUS UPPER EXTREMITY;  Left Wrist Arteriovenous Fistula Placement;  Surgeon: Shashi Castro MD;  Location: [facility] OR     CREATE FISTULA ARTERIOVENOUS UPPER EXTREMITY       CYSTOSCOPY, RETROGRADES, INSERT STENT URETER(S), COMBINED N/A 2020    Procedure: CYSTOSCOPY, WITH RETROGRADE PYELOGRAM, CYSTOGRAM AND URETERAL STENT INSERTION - TRANSPLANT KIDNEY;  Surgeon: Wally Britt MD;  [facility]     IR CEREBRAL ANGIOGRAM  2014     PERCUTANEOUS BIOPSY KIDNEY Right 2019    Procedure: Right Kidney Biopsy;  Surgeon: Deny Rios MD;  [facility]: [facility]     RASTA/DIALYSIS CATHETER  12/10/2013          TRANSPLANT KIDNEY RECIPIENT  DONOR N/A 8/3/2019    Procedure: TRANSPLANT, KIDNEY, RECIPIENT,  DONOR with Ureteral Stent Placement;  Surgeon: Dorian Johnson MD;  Location: [facility] OR       Allergies:  Allergies   Allergen Reactions     Contrast Dye Rash     CT contrast allergy 19 rash over eyes. Need to have pre medication before a CT WITH CONTRAST      Lisinopril Swelling     angioedema     Nitrous Oxide Other (See Comments)     Sense of doom     Chlorhexidine Rash     Rash at site     Sulfa Drugs Rash     Muscle stiffness of neck     Dapsone      Methemoglobinemia     Furosemide Other (See Comments)     Skin flushing     Metrogel [Metronidazole]      Hives diffusely on body after vaginal administration.     Azithromycin Dizziness and Rash     Cefuroxime Rash       Current Medications MEDICATIONS IN THIS CHART MAY NOT BE ACCURATE.    Current Outpatient Medications   Medication     acetaminophen (TYLENOL) 325 MG tablet     aspirin (ASA) 81 MG chewable tablet     atorvastatin (LIPITOR) 10 MG tablet     azaTHIOprine (IMURAN) 50 MG tablet     cyclobenzaprine (FLEXERIL) 5 MG tablet     diphenhydrAMINE (BENADRYL) 25 MG capsule     EPINEPHrine (ANY BX GENERIC EQUIV) 0.3 MG/0.3ML injection 2-pack     famotidine (PEPCID) 20 MG  tablet     fludrocortisone (FLORINEF) 0.1 MG tablet     hydrOXYzine (ATARAX) 10 MG tablet     levothyroxine (SYNTHROID/LEVOTHROID) 25 MCG tablet     minoxidil (ROGAINE) 5 % external solution     norethindrone (MICRONOR) 0.35 MG tablet     polyethylene glycol (MIRALAX) 17 GM/Dose powder     Prenatal Vit-Fe Fumarate-FA (PRENATAL MULTIVITAMIN W/IRON) 27-0.8 MG tablet     Prenatal Vit-Fe Fumarate-FA (PRENATAL VITAMINS) 28-0.8 MG TABS     senna-docusate (SENOKOT-S/PERICOLACE) 8.6-50 MG tablet     sennosides (SENOKOT) 8.6 MG tablet     simethicone (MYLICON) 125 MG chewable tablet     sodium chloride (OCEAN) 0.65 % nasal spray     tacrolimus (GENERIC EQUIVALENT) 0.5 MG capsule     tacrolimus (GENERIC EQUIVALENT) 1 MG capsule     No current facility-administered medications for this visit.       Family History:  Family History   Problem Relation Age of Onset     Kidney Disease Mother      Kidney failure Mother      Coronary Artery Disease Father      Cerebrovascular Disease Father      Heart Disease Father      Sleep Apnea Father      Hypertension Sister      Diabetes Sister      Cerebrovascular Disease Sister      Kidney Disease Sister      Breast Cancer No family hx of      Cancer - colorectal No family hx of      Ovarian Cancer No family hx of      Prostate Cancer No family hx of      Other Cancer No family hx of      Asthma No family hx of      Anesthesia Reaction No family hx of      Deep Vein Thrombosis (DVT) No family hx of      Melanoma No family hx of      Skin Cancer No family hx of        Social History:  Social History     Tobacco Use     Smoking status: Never Smoker     Smokeless tobacco: Never Used   Substance Use Topics     Alcohol use: No     Alcohol/week: 0.0 standard drinks       ROS:  see HPI    Exam  No vitals, video visit  GEN: Healthy, alert and no distress  HEENT: EOMI  RESP: No audible wheeze, cough, or visible cyanosis  SKIN: Visible skin clear  NEURO: Cranial nerves grossly intact. Mentation and  speech appropriate for age  PSYCH: Mentation appears normal, affect normal/bright, judgement and insight intact, normal speech and appearance well-groomed    Labs/Imaging  See hpi    Assessment and Plan  See above    Patient Instructions:    Nice to talk with you today in virtual clinic!    For now, please continue your current levothyroxine regime of 25 mcg x 4 days weekly + 37.5 mcg x 3 days weekly for total weekly dose of 212.5 mcg weekly (8.5 tabs weekly total)    If you become pregnant, add one extra dose of 37.5 mcg weekly & get to lab monthly & follow-up w/ ENDO monthly    Thyroid hormone replacement doses may change with significant body weight changes, so if this occurs, you will need thyroid labs sooner than once a year    For your labs, standing orders are in for monthly labs just in case . . .   If become pregnant, monthly TSH, FT4, otherwise quarterly if body weight changes by 30 lbs or more, or 1-2 times a year if weight stable and not pregnant    Always, 6-8 weeks after any dose changes in non-pregnant state . . . TSH, FT4 due    In future, please go to any [facility] lab.  Follow standard safety precautions for COVID-19. Practice social isolation, hand hygiene, do not touch your face, nose, or mouth, wear mask if have to go out in public to be respectful of community.  Please contact us to schedule at any of our [facility] lab locations. Call [phone] ([phone]), select option 1.    RTC: annual as needed based on results    Please use [facility] to schedule your appointment(s) or call [phone] to schedule in Endocrinology and Diabetes Clinic      Dr. Fidelina Wiggins MD, MPH  Endocrinologist      Video: 22 minutes      Total Time: 57 min spent on chart review, evaluation, management, counseling, education, & motivational interviewing on the day of encounter      Answers for HPI/ROS submitted by the patient on 8/5/2022  General Symptoms: No  Skin Symptoms:  No  HENT Symptoms: Yes  EYE SYMPTOMS: No  HEART SYMPTOMS: Yes  LUNG SYMPTOMS: No  INTESTINAL SYMPTOMS: No  URINARY SYMPTOMS: No  GYNECOLOGIC SYMPTOMS: No  BREAST SYMPTOMS: No  SKELETAL SYMPTOMS: Yes  BLOOD SYMPTOMS: Yes  NERVOUS SYSTEM SYMPTOMS: No  MENTAL HEALTH SYMPTOMS: No  Ear pain: No  Ear discharge: No  Hearing loss: No  Tinnitus: Yes  Nosebleeds: No  Congestion: No  Sinus pain: No  Trouble swallowing: No   Voice hoarseness: No  Mouth sores: No  Sore throat: No  Tooth pain: No  Gum tenderness: No  Bleeding gums: No  Change in taste: No  Change in sense of smell: No  Dry mouth: No  Hearing aid used: No  Neck lump: No  Chest pain or pressure: No  Fast or irregular heartbeat: No  Pain in legs with walking: No  Trouble breathing while lying down: No  Fingers or toes appear blue: No  High blood pressure: No  Low blood pressure: Yes  Fainting: No  Murmurs: No  Pacemaker: No  Varicose veins: No  Wake up at night with shortness of breath: No  Light-headedness: Yes  Exercise intolerance: No  Back pain: Yes  Muscle aches: Yes  Neck pain: No  Swollen joints: No  Joint pain: No  Bone pain: No  Muscle cramps: Yes  Muscle weakness: No  Joint stiffness: No  Bone fracture: No  Edema or swelling: No  Anemia: No  Swollen glands: No  Easy bleeding or bruising: Yes

## 2022-08-10 NOTE — PROGRESS NOTES
Mona Wagner is being evaluated via a billable video visit.        How would you like to obtain your AVS? MyChart  For the video visit, send the invitation by: Send to e-mail at: [email or web address]  Will anyone else be joining your video visit? No

## 2022-08-10 NOTE — LETTER
8/10/2022       RE: Mona Wagner  [address]  [place] [address]     Dear Colleague,    Thank you for referring your patient, Mona Wagner, to the [facility] ENDOCRINOLOG[facility] [place] [facility]. Please see a copy of my visit note below.         Diabetes & Endocrinology Clinic New Consult     Reason for visit/consult:   Follow-up for: hypothyroidism and hyperprolactinemia Dr. Coles patient - Dr. Coles left  Patient is scheduled in a follow-up patient time slot    Primary care provider:   Macarena Bowen MD      PCP - General, Family Practice + 2 more     Since 1/18/2016     [phone] + 2 more     ASSESSMENT/PLAN:   Katlyn Wagner is 31 years old female patient with a past medical history of suspected IgA nephropathy and ESKD s/p kidney transplant 8/3/2019, history of hypothyroidism, hyperprolactinemia likely secondary to hypothyroidism and end-stage kidney disease, with a questionable microadenoma, in follow-up of her endocrine issues.      1. History of Galactorrhea and hyperprolactinemia (did not come up today, had renal transplant and prolactin has normalized>>> resolved  Galactorrhea has resolved and prolactin levels have been normal since her kidney transplant. Most recent PRL is juts above the normal range. The normalization of her PRL levels with treatment of hypothyroidism and normalization of her kidney function after her transplant suggests that the patient does not have a pituitary adenoma that is secreting prolactin, and that this was rather functional hyperprolactinemia, likely related to hypothyroidism and end-stage kidney disease.  - discussed that PRL levels are expected to increase with pregnancy   Latest Reference Range & Units 06/22/22 07:08   HCG Qualitative Serum Negative  Negative     - If there is evidence of a pituitary adenoma secreting prolactin in the future, we would consider treatment with  cabergoline       Latest Reference Range & Units 06/22/22 06:39   Protein Total 6.0 - 8.0 g/dL 8.0     2. Hypothyroidism  Clinically euthyroid on current replacement doses.  -continue levothyroxine 25mcg 4x weekly and 37.5mcg 3x weekly  -If Mona becomes pregnant, she should increase levothyroxine by adding 37.5 mcg to weekly dose, get to lab monthly, see Endo monthly for adjustments, standing orders are in     ENDO THYROID LABS[facility] Latest Ref Rng & Units 7/13/2022   TSH 0.40 - 4.00 mU/L 1.74     ENDO THYROID LABS-[facility] Latest Ref Rng & Units 7/13/2022   THYR PEROXIDASE BLAKE <35 IU/mL <10        ENDO THYROID LABS-[facility] Latest Ref Rng & Units 4/22/2020   FREE T4 0.76 - 1.46 ng/dL 1.02     Return to clinic in 12 months for follow-up, sooner if the patient becomes pregnant     For HPI, see note of Dr. Coles from 11/16/21, reviewed    Past Medical/Surgical History:  Past Medical History:   Diagnosis Date     Anemia in chronic kidney disease      History of bacterial endocarditis      History of blood transfusion      History of DVT (deep vein thrombosis) 2014     History of seizure 2014     History of subarachnoid hemorrhage      Hydronephrosis      Hyperprolactinemia (H)      Hypertension     resolved after transplant     Hypothyroidism      Kidney transplanted 08/03/2019    DCD DDKT. Induction with thymo 6mg/kg.     Orthostatic hypotension      FATOUMATA (obstructive sleep apnea)      Pyelonephritis of transplanted kidney 01/23/2020     Secondary renal hyperparathyroidism (H)      Urinary tract infection      Past Surgical History:   Procedure Laterality Date     BENCH KIDNEY N/A 8/3/2019    Procedure: BACKBENCH PREPARATION, ALLOGRAFT, KIDNEY;  Surgeon: Dorian Johnson MD;  [facility]     COMBINED CYSTOSCOPY, RETROGRADES, EXCHANGE STENT URETER(S) Right 11/23/2020    Procedure: CYSTOSCOPY, CYSOTGRAM, WITH RETROGRADE PYELOGRAM, ureteroscopy,  AND URETERAL STENT;  Surgeon: Wally Britt MD;  Location: [facility] OR      COMBINED CYSTOSCOPY, RETROGRADES, URETEROSCOPY, LASER HOLMIUM LITHOTRIPSY URETER(S), INSERT STENT N/A 2019    Procedure: CYSTOURETEROSCOPY, WITH RETROGRADE PYELOGRAM of transplant kidney, STENT INSERTION, Laser on standby;  Surgeon: Wally Britt MD;  [facility]     CREATE FISTULA ARTERIOVENOUS UPPER EXTREMITY  2014    Procedure: CREATE FISTULA ARTERIOVENOUS UPPER EXTREMITY;  Left Wrist Arteriovenous Fistula Placement;  Surgeon: Shashi Castro MD;  Location: [facility] OR     CREATE FISTULA ARTERIOVENOUS UPPER EXTREMITY       CYSTOSCOPY, RETROGRADES, INSERT STENT URETER(S), COMBINED N/A 2020    Procedure: CYSTOSCOPY, WITH RETROGRADE PYELOGRAM, CYSTOGRAM AND URETERAL STENT INSERTION - TRANSPLANT KIDNEY;  Surgeon: Wally Britt MD;  [facility]     IR CEREBRAL ANGIOGRAM  2014     PERCUTANEOUS BIOPSY KIDNEY Right 2019    Procedure: Right Kidney Biopsy;  Surgeon: Deny Rios MD;  [facility]     RASTA/DIALYSIS CATHETER  12/10/2013          TRANSPLANT KIDNEY RECIPIENT  DONOR N/A 8/3/2019    Procedure: TRANSPLANT, KIDNEY, RECIPIENT,  DONOR with Ureteral Stent Placement;  Surgeon: Dorian Johnson MD;  Location: [facility] OR       Allergies:  Allergies   Allergen Reactions     Contrast Dye Rash     CT contrast allergy 19 rash over eyes. Need to have pre medication before a CT WITH CONTRAST      Lisinopril Swelling     angioedema     Nitrous Oxide Other (See Comments)     Sense of doom     Chlorhexidine Rash     Rash at site     Sulfa Drugs Rash     Muscle stiffness of neck     Dapsone      Methemoglobinemia     Furosemide Other (See Comments)     Skin flushing     Metrogel [Metronidazole]      Hives diffusely on body after vaginal administration.     Azithromycin Dizziness and Rash     Cefuroxime Rash       Current Medications MEDICATIONS IN THIS CHART MAY NOT BE ACCURATE.    Current Outpatient Medications   Medication     acetaminophen (TYLENOL) 325  MG tablet     aspirin (ASA) 81 MG chewable tablet     atorvastatin (LIPITOR) 10 MG tablet     azaTHIOprine (IMURAN) 50 MG tablet     cyclobenzaprine (FLEXERIL) 5 MG tablet     diphenhydrAMINE (BENADRYL) 25 MG capsule     EPINEPHrine (ANY BX GENERIC EQUIV) 0.3 MG/0.3ML injection 2-pack     famotidine (PEPCID) 20 MG tablet     fludrocortisone (FLORINEF) 0.1 MG tablet     hydrOXYzine (ATARAX) 10 MG tablet     levothyroxine (SYNTHROID/LEVOTHROID) 25 MCG tablet     minoxidil (ROGAINE) 5 % external solution     norethindrone (MICRONOR) 0.35 MG tablet     polyethylene glycol (MIRALAX) 17 GM/Dose powder     Prenatal Vit-Fe Fumarate-FA (PRENATAL MULTIVITAMIN W/IRON) 27-0.8 MG tablet     Prenatal Vit-Fe Fumarate-FA (PRENATAL VITAMINS) 28-0.8 MG TABS     senna-docusate (SENOKOT-S/PERICOLACE) 8.6-50 MG tablet     sennosides (SENOKOT) 8.6 MG tablet     simethicone (MYLICON) 125 MG chewable tablet     sodium chloride (OCEAN) 0.65 % nasal spray     tacrolimus (GENERIC EQUIVALENT) 0.5 MG capsule     tacrolimus (GENERIC EQUIVALENT) 1 MG capsule     No current facility-administered medications for this visit.       Family History:  Family History   Problem Relation Age of Onset     Kidney Disease Mother      Kidney failure Mother      Coronary Artery Disease Father      Cerebrovascular Disease Father      Heart Disease Father      Sleep Apnea Father      Hypertension Sister      Diabetes Sister      Cerebrovascular Disease Sister      Kidney Disease Sister      Breast Cancer No family hx of      Cancer - colorectal No family hx of      Ovarian Cancer No family hx of      Prostate Cancer No family hx of      Other Cancer No family hx of      Asthma No family hx of      Anesthesia Reaction No family hx of      Deep Vein Thrombosis (DVT) No family hx of      Melanoma No family hx of      Skin Cancer No family hx of        Social History:  Social History     Tobacco Use     Smoking status: Never Smoker     Smokeless tobacco: Never Used    Substance Use Topics     Alcohol use: No     Alcohol/week: 0.0 standard drinks       ROS:  see HPI    Exam  No vitals, video visit  GEN: Healthy, alert and no distress  HEENT: EOMI  RESP: No audible wheeze, cough, or visible cyanosis  SKIN: Visible skin clear  NEURO: Cranial nerves grossly intact. Mentation and speech appropriate for age  PSYCH: Mentation appears normal, affect normal/bright, judgement and insight intact, normal speech and appearance well-groomed    Labs/Imaging  See hpi    Assessment and Plan  See above    Patient Instructions:    Nice to talk with you today in virtual clinic!    For now, please continue your current levothyroxine regime of 25 mcg x 4 days weekly + 37.5 mcg x 3 days weekly for total weekly dose of 212.5 mcg weekly (8.5 tabs weekly total)    If you become pregnant, add one extra dose of 37.5 mcg weekly & get to lab monthly & follow-up w/ ENDO monthly    Thyroid hormone replacement doses may change with significant body weight changes, so if this occurs, you will need thyroid labs sooner than once a year    For your labs, standing orders are in for monthly labs just in case . . .   If become pregnant, monthly TSH, FT4, otherwise quarterly if body weight changes by 30 lbs or more, or 1-2 times a year if weight stable and not pregnant    Always, 6-8 weeks after any dose changes in non-pregnant state . . . TSH, FT4 due    In future, please go to any [facility] lab.  Follow standard safety precautions for COVID-19. Practice social isolation, hand hygiene, do not touch your face, nose, or mouth, wear mask if have to go out in public to be respectful of community.  Please contact us to schedule at any of our [facility] lab locations. Call [phone] ([phone]), select option 1.    RTC: annual as needed based on results    Please use [facility] to schedule your appointment(s) or call [phone] to schedule in Endocrinology and Diabetes Clinic      Best Wishes,    Fidelina Robertson MD, MPH  Endocrinologist      Video: 22 minutes      Total Time: 57 min spent on chart review, evaluation, management, counseling, education, & motivational interviewing on the day of encounter      Answers for HPI/ROS submitted by the patient on 8/5/2022  General Symptoms: No  Skin Symptoms: No  HENT Symptoms: Yes  EYE SYMPTOMS: No  HEART SYMPTOMS: Yes  LUNG SYMPTOMS: No  INTESTINAL SYMPTOMS: No  URINARY SYMPTOMS: No  GYNECOLOGIC SYMPTOMS: No  BREAST SYMPTOMS: No  SKELETAL SYMPTOMS: Yes  BLOOD SYMPTOMS: Yes  NERVOUS SYSTEM SYMPTOMS: No  MENTAL HEALTH SYMPTOMS: No  Ear pain: No  Ear discharge: No  Hearing loss: No  Tinnitus: Yes  Nosebleeds: No  Congestion: No  Sinus pain: No  Trouble swallowing: No   Voice hoarseness: No  Mouth sores: No  Sore throat: No  Tooth pain: No  Gum tenderness: No  Bleeding gums: No  Change in taste: No  Change in sense of smell: No  Dry mouth: No  Hearing aid used: No  Neck lump: No  Chest pain or pressure: No  Fast or irregular heartbeat: No  Pain in legs with walking: No  Trouble breathing while lying down: No  Fingers or toes appear blue: No  High blood pressure: No  Low blood pressure: Yes  Fainting: No  Murmurs: No  Pacemaker: No  Varicose veins: No  Wake up at night with shortness of breath: No  Light-headedness: Yes  Exercise intolerance: No  Back pain: Yes  Muscle aches: Yes  Neck pain: No  Swollen joints: No  Joint pain: No  Bone pain: No  Muscle cramps: Yes  Muscle weakness: No  Joint stiffness: No  Bone fracture: No  Edema or swelling: No  Anemia: No  Swollen glands: No  Easy bleeding or bruising: Yes        Mona Wagner is being evaluated via a billable video visit.        How would you like to obtain your AVS? MyChart  For the video visit, send the invitation by: Send to e-mail at: [email or web address]  Will anyone else be joining your video visit? No

## 2022-08-21 ENCOUNTER — HEALTH MAINTENANCE LETTER (OUTPATIENT)
Age: 32
End: 2022-08-21

## 2022-09-08 DIAGNOSIS — Z94.0 KIDNEY REPLACED BY TRANSPLANT: ICD-10-CM

## 2022-09-09 RX ORDER — TACROLIMUS 1 MG/1
CAPSULE ORAL
Qty: 180 CAPSULE | Refills: 11 | Status: SHIPPED | OUTPATIENT
Start: 2022-09-09 | End: 2023-02-07

## 2022-09-14 ENCOUNTER — LAB (OUTPATIENT)
Dept: LAB | Facility: CLINIC | Age: 32
End: 2022-09-14
Payer: MEDICARE

## 2022-09-14 DIAGNOSIS — Z79.899 ENCOUNTER FOR LONG-TERM CURRENT USE OF MEDICATION: ICD-10-CM

## 2022-09-14 DIAGNOSIS — Z94.0 KIDNEY REPLACED BY TRANSPLANT: ICD-10-CM

## 2022-09-14 DIAGNOSIS — Z48.298 AFTERCARE FOLLOWING ORGAN TRANSPLANT: ICD-10-CM

## 2022-09-14 LAB
ANION GAP SERPL CALCULATED.3IONS-SCNC: 12 MMOL/L (ref 7–15)
BUN SERPL-MCNC: 24.3 MG/DL (ref 6–20)
CALCIUM SERPL-MCNC: 10.9 MG/DL (ref 8.6–10)
CHLORIDE SERPL-SCNC: 104 MMOL/L (ref 98–107)
CREAT SERPL-MCNC: 0.85 MG/DL (ref 0.51–0.95)
DEPRECATED HCO3 PLAS-SCNC: 22 MMOL/L (ref 22–29)
ERYTHROCYTE [DISTWIDTH] IN BLOOD BY AUTOMATED COUNT: 13.9 % (ref 10–15)
GFR SERPL CREATININE-BSD FRML MDRD: >90 ML/MIN/1.73M2
GLUCOSE SERPL-MCNC: 94 MG/DL (ref 70–99)
HCT VFR BLD AUTO: 36.4 % (ref 35–47)
HGB BLD-MCNC: 11.8 G/DL (ref 11.7–15.7)
MCH RBC QN AUTO: 30.6 PG (ref 26.5–33)
MCHC RBC AUTO-ENTMCNC: 32.4 G/DL (ref 31.5–36.5)
MCV RBC AUTO: 95 FL (ref 78–100)
PLATELET # BLD AUTO: 186 10E3/UL (ref 150–450)
POTASSIUM SERPL-SCNC: 4.4 MMOL/L (ref 3.4–5.3)
RBC # BLD AUTO: 3.85 10E6/UL (ref 3.8–5.2)
SODIUM SERPL-SCNC: 138 MMOL/L (ref 136–145)
TACROLIMUS BLD-MCNC: 4.2 UG/L (ref 5–15)
TME LAST DOSE: ABNORMAL H
TME LAST DOSE: ABNORMAL H
WBC # BLD AUTO: 5 10E3/UL (ref 4–11)

## 2022-09-14 PROCEDURE — 86832 HLA CLASS I HIGH DEFIN QUAL: CPT

## 2022-09-14 PROCEDURE — 36415 COLL VENOUS BLD VENIPUNCTURE: CPT

## 2022-09-14 PROCEDURE — 80048 BASIC METABOLIC PNL TOTAL CA: CPT

## 2022-09-14 PROCEDURE — 86833 HLA CLASS II HIGH DEFIN QUAL: CPT

## 2022-09-14 PROCEDURE — 85027 COMPLETE CBC AUTOMATED: CPT

## 2022-09-14 PROCEDURE — 80197 ASSAY OF TACROLIMUS: CPT

## 2022-09-28 DIAGNOSIS — R12 HEARTBURN: ICD-10-CM

## 2022-09-28 NOTE — TELEPHONE ENCOUNTER
Message to physician:     Date of last visit: 6/24/2022    Date of next visit if scheduled:     Potassium   Date Value Ref Range Status   09/14/2022 4.4 3.4 - 5.3 mmol/L Final   08/02/2022 4.4 3.5 - 5.0 mmol/L Final   07/07/2021 4.2 3.4 - 5.3 mmol/L Final     Creatinine   Date Value Ref Range Status   09/14/2022 0.85 0.51 - 0.95 mg/dL Final   07/07/2021 0.91 0.52 - 1.04 mg/dL Final     GFR Estimate   Date Value Ref Range Status   09/14/2022 >90 >60 mL/min/1.73m2 Final     Comment:     Effective December 21, 2021 eGFRcr in adults is calculated using the 2021 CKD-EPI creatinine equation which includes age and gender (Dillon velásquez al., NEJM, DOI: 10.1056/BVVBzk0311727)   07/07/2021 86 >60 mL/min/[1.73_m2] Final     Comment:     Non  GFR Calc  Starting 12/18/2018, serum creatinine based estimated GFR (eGFR) will be   calculated using the Chronic Kidney Disease Epidemiology Collaboration   (CKD-EPI) equation.         BP Readings from Last 3 Encounters:   08/05/22 120/72   06/24/22 112/71   06/22/22 113/80       Hemoglobin A1C   Date Value Ref Range Status   08/04/2020 5.5 0 - 5.6 % Final     Comment:     Normal <5.7% Prediabetes 5.7-6.4%  Diabetes 6.5% or higher - adopted from ADA   consensus guidelines.         Please complete refill and CLOSE ENCOUNTER.  Closing the encounter signifies the refill is complete.     SIRS (systemic inflammatory response syndrome)

## 2022-09-30 RX ORDER — FAMOTIDINE 20 MG/1
20 TABLET, FILM COATED ORAL 2 TIMES DAILY
Qty: 180 TABLET | Refills: 3 | Status: SHIPPED | OUTPATIENT
Start: 2022-09-30 | End: 2023-07-24

## 2022-10-05 PROBLEM — H66.90 EAR INFECTION: Status: ACTIVE | Noted: 2022-04-08

## 2022-11-07 ENCOUNTER — LAB (OUTPATIENT)
Dept: LAB | Facility: HOSPITAL | Age: 32
End: 2022-11-07
Payer: MEDICARE

## 2022-11-07 DIAGNOSIS — Z79.899 ENCOUNTER FOR LONG-TERM CURRENT USE OF MEDICATION: ICD-10-CM

## 2022-11-07 DIAGNOSIS — Z94.0 KIDNEY REPLACED BY TRANSPLANT: ICD-10-CM

## 2022-11-07 DIAGNOSIS — Z48.298 AFTERCARE FOLLOWING ORGAN TRANSPLANT: Primary | ICD-10-CM

## 2022-11-07 LAB
ANION GAP SERPL CALCULATED.3IONS-SCNC: 9 MMOL/L (ref 7–15)
BUN SERPL-MCNC: 25.2 MG/DL (ref 6–20)
CALCIUM SERPL-MCNC: 10.7 MG/DL (ref 8.6–10)
CHLORIDE SERPL-SCNC: 106 MMOL/L (ref 98–107)
CREAT SERPL-MCNC: 0.93 MG/DL (ref 0.51–0.95)
DEPRECATED HCO3 PLAS-SCNC: 22 MMOL/L (ref 22–29)
ERYTHROCYTE [DISTWIDTH] IN BLOOD BY AUTOMATED COUNT: 13.1 % (ref 10–15)
GFR SERPL CREATININE-BSD FRML MDRD: 85 ML/MIN/1.73M2
GLUCOSE SERPL-MCNC: 94 MG/DL (ref 70–99)
HCT VFR BLD AUTO: 39.2 % (ref 35–47)
HGB BLD-MCNC: 12.6 G/DL (ref 11.7–15.7)
MCH RBC QN AUTO: 30.8 PG (ref 26.5–33)
MCHC RBC AUTO-ENTMCNC: 32.1 G/DL (ref 31.5–36.5)
MCV RBC AUTO: 96 FL (ref 78–100)
PLATELET # BLD AUTO: 220 10E3/UL (ref 150–450)
POTASSIUM SERPL-SCNC: 4.7 MMOL/L (ref 3.4–5.3)
RBC # BLD AUTO: 4.09 10E6/UL (ref 3.8–5.2)
SODIUM SERPL-SCNC: 137 MMOL/L (ref 136–145)
TACROLIMUS BLD-MCNC: 4.9 UG/L (ref 5–15)
TME LAST DOSE: ABNORMAL H
TME LAST DOSE: ABNORMAL H
WBC # BLD AUTO: 5.9 10E3/UL (ref 4–11)

## 2022-11-07 PROCEDURE — 80048 BASIC METABOLIC PNL TOTAL CA: CPT

## 2022-11-07 PROCEDURE — 80197 ASSAY OF TACROLIMUS: CPT

## 2022-11-07 PROCEDURE — 85027 COMPLETE CBC AUTOMATED: CPT

## 2022-11-07 PROCEDURE — 36415 COLL VENOUS BLD VENIPUNCTURE: CPT

## 2022-12-06 ENCOUNTER — MYC MEDICAL ADVICE (OUTPATIENT)
Dept: FAMILY MEDICINE | Facility: CLINIC | Age: 32
End: 2022-12-06

## 2022-12-06 DIAGNOSIS — R09.89 RUNNY NOSE: Primary | ICD-10-CM

## 2022-12-07 RX ORDER — FLUTICASONE PROPIONATE 50 MCG
1 SPRAY, SUSPENSION (ML) NASAL DAILY
Qty: 16 G | Refills: 1 | Status: SHIPPED | OUTPATIENT
Start: 2022-12-07 | End: 2023-04-13

## 2022-12-27 DIAGNOSIS — Z94.0 KIDNEY TRANSPLANTED: ICD-10-CM

## 2022-12-28 RX ORDER — ASPIRIN 81 MG/1
81 TABLET, CHEWABLE ORAL DAILY
Qty: 90 TABLET | Refills: 1 | Status: SHIPPED | OUTPATIENT
Start: 2022-12-28 | End: 2023-05-26

## 2023-01-09 ENCOUNTER — LAB (OUTPATIENT)
Dept: LAB | Facility: HOSPITAL | Age: 33
End: 2023-01-09
Payer: MEDICARE

## 2023-01-09 DIAGNOSIS — Z94.0 KIDNEY REPLACED BY TRANSPLANT: ICD-10-CM

## 2023-01-09 DIAGNOSIS — Z79.899 NEED FOR PROPHYLACTIC CHEMOTHERAPY: Primary | ICD-10-CM

## 2023-01-09 LAB
ANION GAP SERPL CALCULATED.3IONS-SCNC: 11 MMOL/L (ref 7–15)
BUN SERPL-MCNC: 23.7 MG/DL (ref 6–20)
CALCIUM SERPL-MCNC: 11 MG/DL (ref 8.6–10)
CHLORIDE SERPL-SCNC: 104 MMOL/L (ref 98–107)
CREAT SERPL-MCNC: 0.92 MG/DL (ref 0.51–0.95)
DEPRECATED HCO3 PLAS-SCNC: 23 MMOL/L (ref 22–29)
ERYTHROCYTE [DISTWIDTH] IN BLOOD BY AUTOMATED COUNT: 13.1 % (ref 10–15)
GFR SERPL CREATININE-BSD FRML MDRD: 85 ML/MIN/1.73M2
GLUCOSE SERPL-MCNC: 101 MG/DL (ref 70–99)
HCT VFR BLD AUTO: 40.1 % (ref 35–47)
HGB BLD-MCNC: 12.8 G/DL (ref 11.7–15.7)
MCH RBC QN AUTO: 30.3 PG (ref 26.5–33)
MCHC RBC AUTO-ENTMCNC: 31.9 G/DL (ref 31.5–36.5)
MCV RBC AUTO: 95 FL (ref 78–100)
PLATELET # BLD AUTO: 233 10E3/UL (ref 150–450)
POTASSIUM SERPL-SCNC: 4.4 MMOL/L (ref 3.4–5.3)
RBC # BLD AUTO: 4.23 10E6/UL (ref 3.8–5.2)
SODIUM SERPL-SCNC: 138 MMOL/L (ref 136–145)
TACROLIMUS BLD-MCNC: 4.2 UG/L (ref 5–15)
TME LAST DOSE: ABNORMAL H
TME LAST DOSE: ABNORMAL H
WBC # BLD AUTO: 6.7 10E3/UL (ref 4–11)

## 2023-01-09 PROCEDURE — 80048 BASIC METABOLIC PNL TOTAL CA: CPT

## 2023-01-09 PROCEDURE — 36415 COLL VENOUS BLD VENIPUNCTURE: CPT

## 2023-01-09 PROCEDURE — 80197 ASSAY OF TACROLIMUS: CPT

## 2023-01-09 PROCEDURE — 85027 COMPLETE CBC AUTOMATED: CPT

## 2023-01-16 ENCOUNTER — TELEPHONE (OUTPATIENT)
Dept: FAMILY MEDICINE | Facility: CLINIC | Age: 33
End: 2023-01-16

## 2023-01-16 ENCOUNTER — TELEPHONE (OUTPATIENT)
Dept: MATERNAL FETAL MEDICINE | Facility: CLINIC | Age: 33
End: 2023-01-16

## 2023-01-16 ENCOUNTER — OFFICE VISIT (OUTPATIENT)
Dept: FAMILY MEDICINE | Facility: CLINIC | Age: 33
End: 2023-01-16
Payer: MEDICARE

## 2023-01-16 ENCOUNTER — MYC MEDICAL ADVICE (OUTPATIENT)
Dept: ENDOCRINOLOGY | Facility: CLINIC | Age: 33
End: 2023-01-16

## 2023-01-16 ENCOUNTER — TELEPHONE (OUTPATIENT)
Dept: TRANSPLANT | Facility: CLINIC | Age: 33
End: 2023-01-16

## 2023-01-16 VITALS
SYSTOLIC BLOOD PRESSURE: 110 MMHG | DIASTOLIC BLOOD PRESSURE: 68 MMHG | RESPIRATION RATE: 20 BRPM | BODY MASS INDEX: 29.83 KG/M2 | WEIGHT: 158 LBS | HEART RATE: 89 BPM | OXYGEN SATURATION: 98 % | HEIGHT: 61 IN | TEMPERATURE: 98.1 F

## 2023-01-16 DIAGNOSIS — Z48.298 AFTERCARE FOLLOWING ORGAN TRANSPLANT: ICD-10-CM

## 2023-01-16 DIAGNOSIS — Z34.90 PREGNANT: ICD-10-CM

## 2023-01-16 DIAGNOSIS — Z94.0 KIDNEY REPLACED BY TRANSPLANT: Primary | ICD-10-CM

## 2023-01-16 DIAGNOSIS — N92.6 MISSED PERIOD: Primary | ICD-10-CM

## 2023-01-16 DIAGNOSIS — O09.90 HIGH-RISK PREGNANCY, UNSPECIFIED TRIMESTER: ICD-10-CM

## 2023-01-16 LAB — HCG UR QL: POSITIVE

## 2023-01-16 PROCEDURE — 99213 OFFICE O/P EST LOW 20 MIN: CPT | Performed by: STUDENT IN AN ORGANIZED HEALTH CARE EDUCATION/TRAINING PROGRAM

## 2023-01-16 PROCEDURE — 81025 URINE PREGNANCY TEST: CPT | Performed by: STUDENT IN AN ORGANIZED HEALTH CARE EDUCATION/TRAINING PROGRAM

## 2023-01-16 NOTE — TELEPHONE ENCOUNTER
[facility] phone call message- general phone call:    Reason for call: Patient called, asking to relay a message and call back from a nurse. Patient stated she took a pregnancy test on 1/15/2023 and it was positive -- she took another pregnancy test 1/16/2023 and it was positive. Patient requesting a call back in regards to being a high risk transplant patient & being pregnant. Scheduled patient on 1/23/2023 with PCP to confirm pregnancy. Per patient, she only wanted to see PCP for this.    Return call needed: Yes    OK to leave a message on voice mail? Yes    Primary language: English      needed? No    Call taken on January 16, 2023 at 12:15 PM by Michelle Wagner

## 2023-01-16 NOTE — TELEPHONE ENCOUNTER
Patient Call: General  Route to LPN    Reason for call: called in regards of postive test for being pregnant and wanted to know what steps are next moving forward. Call back number [phone].     Call back needed? Yes    Return Call Needed  Same as documented in contacts section  When to return call?: Same day: Route High Priority

## 2023-01-16 NOTE — TELEPHONE ENCOUNTER
Writer will route to PCP to see if PCP will manage patient alongside MFM or if an OBGYN referral should be made. Patient needs viability US per MFM documentation. Danny WADSWORTH

## 2023-01-16 NOTE — PROGRESS NOTES
"  Assessment & Plan     (N92.6) Missed period  (primary encounter diagnosis)  Comment: desired pregnancy.  UPT present in clinic.  Patient medically complex and understanding of the need for higher level of care.  Placed referral for OB/GYN at [place].    Plan: HCG qualitative urine            (O09.90) High-risk pregnancy, unspecified trimester  Comment: Dating US at 8-10 weeks recommended.  Currently 5 weeks 2 days by LMP.  Plan: Ob/Gyn Referral,  OB <14 WKS SINGLE OR FIRST         GESTATION        Recommend daily prenatal vitamin. Initiate care with OB/GYN        Macarena Bowen MD  [facility] PHALEN RADHIKA Dunn is a 32 year old, presenting for the following health issues:  Pregnancy Test      HPI   Positive pregnancy test:  Swtiched medications 6 months ago to prepare for desired pregnancy.  Decided to try to become pregnant last month using her pregnancy stefany.     Medically complex patient with history of Bicornate uterus, history of renal transplant on suppressive medication, history of bacterial endocarditis.  Patient's specialist team is based at the [facility].  She has notified her team about her pregnancy status.     LMP: December 16z, that would make patient 4 weeks 3 days pregnant.     Experiencing some fatigue, no vomiting, no vaginal pain or bleeding        Objective    /68   Pulse 89   Temp 98.1  F (36.7  C)   Resp 20   Ht 1.549 m (5' 1\")   Wt 71.7 kg (158 lb)   LMP 12/16/2022   SpO2 98%   BMI 29.85 kg/m       Body mass index is 29.85 kg/m .  Physical Exam   GEN: NAD  HEENT: head atraumatic, normocephalic, moist mucous membranes, eyes anicteric  CV: RRR w/o M/R/G  PULM: CTAB  ABD: soft, non-tender, no appreciable masses, no guarding, BS present  Neuro: alert and oriented x 3, CN II-XII intact, normal gross motor movements  Psych: appropriate  Ext: no peripheral edema      Results for orders placed or performed in visit on 01/16/23 "   HCG qualitative urine     Status: Abnormal   Result Value Ref Range    hCG Urine Qualitative Positive (A) Negative

## 2023-01-16 NOTE — TELEPHONE ENCOUNTER
Spoke to pt over the phone 1/16/23 and scheduled appointment with Dr. Lopez per Elsi Parks - ERMELINDA.

## 2023-01-16 NOTE — TELEPHONE ENCOUNTER
Phone call to pt re referral that was received from her transplant doctor today. Pt states she just missed her period yesterday. Pt states she took a test yesterday and it was positive and took a test today that was positive. Pt states her LMP was 12/16/22. Pt states she called her transplant provider who then placed an order for her to be seen here. Pt has had a preconception consult here at [facility] on 4/22. Pt will call her PCP and set up a viability us and is aware after she has this done she will then be scheduled to come in and see [facility]. Pt given number for PCC line to call with us information when she has it done. No further questions at this time.Macarena Lord RN

## 2023-01-17 NOTE — NURSING NOTE
OBGYN referral, medication list, and most recent labs faxed to [facility] for high risk pregnancy. Writer to fax over US once completed and office visit note once signed. Danny WADSWORTH

## 2023-01-17 NOTE — TELEPHONE ENCOUNTER
Hello,    Referral for this patient was sent to [facility], but looks like it was meant to go to [facility]. Would anyone on her care team be able to send in a new referral?    Thank you,    Karen, Surgery Scheduler  [facility]

## 2023-01-18 ENCOUNTER — LAB (OUTPATIENT)
Dept: LAB | Facility: HOSPITAL | Age: 33
End: 2023-01-18
Payer: MEDICARE

## 2023-01-18 DIAGNOSIS — Z48.298 AFTERCARE FOLLOWING ORGAN TRANSPLANT: ICD-10-CM

## 2023-01-18 DIAGNOSIS — E03.9 ACQUIRED HYPOTHYROIDISM: ICD-10-CM

## 2023-01-18 DIAGNOSIS — Z94.0 KIDNEY REPLACED BY TRANSPLANT: Primary | ICD-10-CM

## 2023-01-18 DIAGNOSIS — Z94.0 KIDNEY REPLACED BY TRANSPLANT: ICD-10-CM

## 2023-01-18 DIAGNOSIS — E83.52 HYPERCALCEMIA: ICD-10-CM

## 2023-01-18 DIAGNOSIS — Z3A.01 LESS THAN 8 WEEKS GESTATION OF PREGNANCY: ICD-10-CM

## 2023-01-18 DIAGNOSIS — E06.3 HYPOTHYROIDISM DUE TO HASHIMOTO'S THYROIDITIS: ICD-10-CM

## 2023-01-18 DIAGNOSIS — Z34.90 PREGNANT: ICD-10-CM

## 2023-01-18 LAB
ALBUMIN MFR UR ELPH: 6.4 MG/DL (ref 1–14)
ANION GAP SERPL CALCULATED.3IONS-SCNC: 8 MMOL/L (ref 7–15)
BUN SERPL-MCNC: 18.9 MG/DL (ref 6–20)
CALCIUM SERPL-MCNC: 10.8 MG/DL (ref 8.6–10)
CHLORIDE SERPL-SCNC: 102 MMOL/L (ref 98–107)
CREAT SERPL-MCNC: 0.88 MG/DL (ref 0.51–0.95)
CREAT UR-MCNC: 132.4 MG/DL
DEPRECATED HCO3 PLAS-SCNC: 22 MMOL/L (ref 22–29)
ERYTHROCYTE [DISTWIDTH] IN BLOOD BY AUTOMATED COUNT: 13.2 % (ref 10–15)
GFR SERPL CREATININE-BSD FRML MDRD: 90 ML/MIN/1.73M2
GLUCOSE SERPL-MCNC: 99 MG/DL (ref 70–99)
HCT VFR BLD AUTO: 38.8 % (ref 35–47)
HGB BLD-MCNC: 12.6 G/DL (ref 11.7–15.7)
MCH RBC QN AUTO: 30.4 PG (ref 26.5–33)
MCHC RBC AUTO-ENTMCNC: 32.5 G/DL (ref 31.5–36.5)
MCV RBC AUTO: 94 FL (ref 78–100)
PLATELET # BLD AUTO: 230 10E3/UL (ref 150–450)
POTASSIUM SERPL-SCNC: 4.2 MMOL/L (ref 3.4–5.3)
PROT/CREAT 24H UR: 0.05 MG/MG CR (ref 0–0.2)
PTH-INTACT SERPL-MCNC: 79 PG/ML (ref 15–65)
RBC # BLD AUTO: 4.14 10E6/UL (ref 3.8–5.2)
SODIUM SERPL-SCNC: 132 MMOL/L (ref 136–145)
T4 FREE SERPL-MCNC: 1.45 NG/DL (ref 0.9–1.7)
TACROLIMUS BLD-MCNC: 4.3 UG/L (ref 5–15)
TME LAST DOSE: ABNORMAL H
TME LAST DOSE: ABNORMAL H
TSH SERPL DL<=0.005 MIU/L-ACNC: 2.19 UIU/ML (ref 0.3–4.2)
WBC # BLD AUTO: 7.3 10E3/UL (ref 4–11)

## 2023-01-18 PROCEDURE — 83970 ASSAY OF PARATHORMONE: CPT

## 2023-01-18 PROCEDURE — 84443 ASSAY THYROID STIM HORMONE: CPT

## 2023-01-18 PROCEDURE — 80048 BASIC METABOLIC PNL TOTAL CA: CPT

## 2023-01-18 PROCEDURE — 36415 COLL VENOUS BLD VENIPUNCTURE: CPT

## 2023-01-18 PROCEDURE — 84439 ASSAY OF FREE THYROXINE: CPT

## 2023-01-18 PROCEDURE — 84156 ASSAY OF PROTEIN URINE: CPT

## 2023-01-18 PROCEDURE — 85027 COMPLETE CBC AUTOMATED: CPT

## 2023-01-18 PROCEDURE — 80197 ASSAY OF TACROLIMUS: CPT

## 2023-01-18 NOTE — TELEPHONE ENCOUNTER
Patient IS supposed to schedule with [facility] relatively soon due to High Risk pregnancy. We are booked out until 2/21/2023. Would this be okay to add in as a shorter visit, on call or double book?

## 2023-01-18 NOTE — TELEPHONE ENCOUNTER
No, this patient should be MFM primary, we will not co-manage,  All her care should be with MFM, and it sounds like they are already planning to begin seeing her after her viability ultrasound.  She does not need any visits in our office.    Thanks,  JOSE BELTRAN MD     No complaints

## 2023-01-18 NOTE — TELEPHONE ENCOUNTER
Mona was informed by her specialists to try to deliver at the [facility] due to her specialst teams being based there. Mona in agreement. [facility]BGYN [facility] aware of the referral, reached out to patient on 01/16, and advised to get viability US and they will see her once US has been completed. Patient scheduled for US at [facility] on 02/16/23. Mona is to have an appointment with both [facility] and [facility]. OBGYN will likely co-manage with MFM. Please reach out to patient and assist with scheduling at [facility].   Danny WADSWORTH

## 2023-01-19 ENCOUNTER — TELEPHONE (OUTPATIENT)
Dept: ENDOCRINOLOGY | Facility: CLINIC | Age: 33
End: 2023-01-19
Payer: MEDICARE

## 2023-01-19 ENCOUNTER — MYC MEDICAL ADVICE (OUTPATIENT)
Dept: FAMILY MEDICINE | Facility: CLINIC | Age: 33
End: 2023-01-19
Payer: MEDICARE

## 2023-01-20 ENCOUNTER — OFFICE VISIT (OUTPATIENT)
Dept: ENDOCRINOLOGY | Facility: CLINIC | Age: 33
End: 2023-01-20
Payer: MEDICARE

## 2023-01-20 VITALS
WEIGHT: 161.8 LBS | BODY MASS INDEX: 30.57 KG/M2 | HEART RATE: 76 BPM | SYSTOLIC BLOOD PRESSURE: 111 MMHG | DIASTOLIC BLOOD PRESSURE: 72 MMHG

## 2023-01-20 DIAGNOSIS — I82.629 DVT OF UPPER EXTREMITY (DEEP VEIN THROMBOSIS) (H): ICD-10-CM

## 2023-01-20 DIAGNOSIS — R79.89 INCREASED PROLACTIN LEVEL: ICD-10-CM

## 2023-01-20 DIAGNOSIS — E03.9 ACQUIRED HYPOTHYROIDISM: Primary | ICD-10-CM

## 2023-01-20 DIAGNOSIS — E03.9 ACQUIRED HYPOTHYROIDISM: ICD-10-CM

## 2023-01-20 DIAGNOSIS — Z94.0 KIDNEY REPLACED BY TRANSPLANT: Primary | ICD-10-CM

## 2023-01-20 DIAGNOSIS — R79.9 ABNORMAL FINDING OF BLOOD CHEMISTRY, UNSPECIFIED: ICD-10-CM

## 2023-01-20 DIAGNOSIS — Z32.01 PREGNANCY TEST POSITIVE: ICD-10-CM

## 2023-01-20 PROCEDURE — 99215 OFFICE O/P EST HI 40 MIN: CPT | Performed by: INTERNAL MEDICINE

## 2023-01-20 ASSESSMENT — PAIN SCALES - GENERAL: PAINLEVEL: NO PAIN (0)

## 2023-01-20 NOTE — LETTER
"1/20/2023       RE: Mona Wagner  [address]  [place] [address]     Dear Colleague,    Thank you for referring your patient, Mona Wagner, to the [facility] [place] [facility]. Please see a copy of my visit note below.    Mona is a 32 year old female presents today for follow-up of hypothyroidism in the setting of current pregnancy, history of prediabetes, as well as renal transplant    HPI  #1 history of hypothyroidism  Report, she had a history of hypothyroidism that was diagnosed when she had \"no from her nipples\" with associated high prolactin and presumably, high TSH.  She was then placed on levothyroxine and her nipple discharge and prolactin subsequently improved    Interval history: Prior to pregnancy, the patient had been on levothyroxine 25 mcg daily 4 days a week, and 37.5 mcg daily 3 days/week.  However with positive pregnancy test a few weeks ago, she preemptively increased her levothyroxine to 37 mcg daily 6 days a week, and 25 mcg daily 1 day/week.     #2 current pregnancy  Reviewing her chart, this has been planned for many months, with the multidisciplinary team involved.  Currently, the patient is  5 weeks pregnant.  She reports oral contraceptive use from roughly [id number].  Her oral contraceptive use was stopped in 2022 and she reports regular menses.  She is being followed by nephrology, and maternal-fetal medicine.  She presents today so she can include endocrinology follow-up.     #3 history of prediabetes  The patient reports history of prediabetes.  Reviewing the patient's chart, August 2019 hemoglobin A1c was 4.8, February 2020 hemoglobin A1c 5.7, in April 2020 hemoglobin A1c was 5.5.    #4 history of renal transplant in August 2019  The patient has a history of end-stage renal disease, due to suspected IgA nephropathy for which she was previously on dialysis and underwent recent on transplant in " August 2019.     #5 history of high prolactin  Per chart, there is a history of prolactin at 43 in April 2016, 38 in February 2017, 38 in March 2018, 51 in June 2019, 9 in November 2019, 14 and April 2020, 14 in June 2021, 20.9 November 2021, and 17 in July 2022.  2017 had MRI did not clearly show a macroadenoma, although microadenoma cannot be excluded.  No contrast was used.    #6 history of hypercalcemia  This is thought to due to renal related secondary hyperparathyroidism.  Had been on Sensipar, however this was stopped as she was trying to become pregnant.  Nephrology felt no need to intervene on serum calcium greater than 11.  January 2023 PTH is 79, serum calcium was 10.8      Past Medical History  Past Medical History:   Diagnosis Date     Anemia in chronic kidney disease      Ear infection 4/8/2022     History of bacterial endocarditis      History of blood transfusion      History of DVT (deep vein thrombosis) 2014     History of seizure 2014     History of subarachnoid hemorrhage      Hydronephrosis      Hyperprolactinemia (H)      Hypertension     resolved after transplant     Hypothyroidism      Kidney transplanted 08/03/2019    DCD DDKT. Induction with thymo 6mg/kg.     Orthostatic hypotension      FATOUMATA (obstructive sleep apnea)      Pyelonephritis of transplanted kidney 01/23/2020     Secondary renal hyperparathyroidism (H)      Urinary tract infection        Allergies  Allergies   Allergen Reactions     Contrast Dye Rash     CT contrast allergy 12/14/19 rash over eyes. Need to have pre medication before a CT WITH CONTRAST      Lisinopril Swelling     angioedema     Nitrous Oxide Other (See Comments)     Sense of doom     Chlorhexidine Rash     Rash at site     Sulfa Drugs Rash     Muscle stiffness of neck     Dapsone      Methemoglobinemia     Furosemide Other (See Comments)     Skin flushing     Metrogel [Metronidazole]      Hives diffusely on body after vaginal administration.     Azithromycin  Dizziness and Rash     Cefuroxime Rash     Medications  Current Outpatient Medications   Medication Sig Dispense Refill     acetaminophen (TYLENOL) 325 MG tablet Take 2 tablets (650 mg) by mouth every 4 hours as needed for mild pain 100 tablet 3     aspirin (ASA) 81 MG chewable tablet Take 1 tablet (81 mg) by mouth daily 90 tablet 1     azaTHIOprine (IMURAN) 50 MG tablet Take 3 tablets (150 mg) by mouth daily 270 tablet 3     diphenhydrAMINE (BENADRYL) 25 MG capsule Take 1 capsule (25 mg) by mouth daily as needed for itching or allergies Take 1-2 tablets by mouth every 6 hours PRN itching/allergies 60 capsule 0     EPINEPHrine (ANY BX GENERIC EQUIV) 0.3 MG/0.3ML injection 2-pack        famotidine (PEPCID) 20 MG tablet Take 1 tablet (20 mg) by mouth 2 times daily 180 tablet 3     fluticasone (FLONASE) 50 MCG/ACT nasal spray Spray 1 spray into both nostrils daily 16 g 1     hydrOXYzine (ATARAX) 10 MG tablet Take 1 tablet (10 mg) by mouth 3 times daily as needed for anxiety 20 tablet 0     levothyroxine (SYNTHROID/LEVOTHROID) 25 MCG tablet TAKE 1 TABLET BY MOUTH ON TUESDAY, THURSDAY, SATURDAY, AND SUNDAY, TAKE 1.5 TABLETS(37.5 MCG) ON MONDAY, WEDNESDAY, AND FRIDAY 105 tablet 11     polyethylene glycol (MIRALAX) 17 GM/Dose powder Take 17 g (1 capful) by mouth daily as needed for constipation 507 g 3     Prenatal Vit-Fe Fumarate-FA (PRENATAL MULTIVITAMIN W/IRON) 27-0.8 MG tablet Take 1 tablet by mouth daily 90 tablet 3     Prenatal Vit-Fe Fumarate-FA (PRENATAL VITAMINS) 28-0.8 MG TABS Take 1 tablet by mouth daily 90 tablet 3     simethicone (MYLICON) 125 MG chewable tablet Take 1 tablet (125 mg) by mouth 4 times daily as needed for intestinal gas 120 tablet 3     tacrolimus (GENERIC EQUIVALENT) 0.5 MG capsule HOLD 60 capsule 11     tacrolimus (GENERIC EQUIVALENT) 1 MG capsule TAKE 3 CAPSULE BY MOUTH 2 TIMES DAILY 180 capsule 11     Family History  family history includes Cerebrovascular Disease in her father and sister;  Coronary Artery Disease in her father; Diabetes in her sister; Heart Disease in her father; Hypertension in her sister; Kidney Disease in her mother and sister; Kidney failure in her mother; Sleep Apnea in her father.  Social History  Social History     Socioeconomic History     Marital status: Single     Spouse name: Not on file     Number of children: 0     Years of education: Not on file     Highest education level: Not on file   Occupational History     Occupation: Pharmacy Technician   Tobacco Use     Smoking status: Never     Smokeless tobacco: Never   Vaping Use     Vaping Use: Never used   Substance and Sexual Activity     Alcohol use: No     Alcohol/week: 0.0 standard drinks     Drug use: No     Sexual activity: Yes     Partners: Male   Other Topics Concern     Parent/sibling w/ CABG, MI or angioplasty before 65F 55M? Not Asked   Social History Narrative    Date of Service:5/15/2013     Name: Mona VALDOVINOS (Month, Day, Year of birth): 1990     MRN: [id number]     New Patient: Yes    Preferred Language: English     Needed: No    County of Residence: [place]    Marital Status:     Household size: 8    Number of Dependents:0     Pregnant: 0    Average Monthly Income: $ 600    Citizenship Status: Citizen    Gave Pt [employer] and [place] applications     Social Determinants of Health     Financial Resource Strain: Not on file   Food Insecurity: Not on file   Transportation Needs: Not on file   Physical Activity: Not on file   Stress: Not on file   Social Connections: Not on file   Intimate Partner Violence: Not on file   Housing Stability: Not on file       ROS  Per HPI      Physical Exam  Blood pressure 111/72, pulse 76, weight 73.4 kg (161 lb 12.8 oz), last menstrual period 2022, not currently breastfeeding.    Exam:  GENERAL APPEARANCE: Alert and no distress  NECK: No lymphadenopathy appreciated  Thyroid: No obvious nodules palpated   CV: RRR without M/R/G  Lungs: CTA  bilaterally  Abdomen: Soft, Nontender, non distended, positive bowel sounds   Neuro:  no focal deficits  Skin: No infection in feet   Mood: Normal   Lymph: neg in neck and supraclavicular area  Musculoskeletal: Left arm fistula      RESULTS  Lab on 01/18/2023   Component Date Value Ref Range Status     TSH 01/18/2023 2.19  0.30 - 4.20 uIU/mL Final     Free T4 01/18/2023 1.45  0.90 - 1.70 ng/dL Final     Sodium 01/18/2023 132 (L)  136 - 145 mmol/L Final     Potassium 01/18/2023 4.2  3.4 - 5.3 mmol/L Final     Chloride 01/18/2023 102  98 - 107 mmol/L Final     Carbon Dioxide (CO2) 01/18/2023 22  22 - 29 mmol/L Final     Anion Gap 01/18/2023 8  7 - 15 mmol/L Final     Urea Nitrogen 01/18/2023 18.9  6.0 - 20.0 mg/dL Final     Creatinine 01/18/2023 0.88  0.51 - 0.95 mg/dL Final     Calcium 01/18/2023 10.8 (H)  8.6 - 10.0 mg/dL Final     Glucose 01/18/2023 99  70 - 99 mg/dL Final     GFR Estimate 01/18/2023 90  >60 mL/min/1.73m2 Final    Effective December 21, 2021 eGFRcr in adults is calculated using the 2021 CKD-EPI creatinine equation which includes age and gender (Dillon velásquez al., NE, DOI: 10.1056/FWERdv0843752)     WBC Count 01/18/2023 7.3  4.0 - 11.0 10e3/uL Final     RBC Count 01/18/2023 4.14  3.80 - 5.20 10e6/uL Final     Hemoglobin 01/18/2023 12.6  11.7 - 15.7 g/dL Final     Hematocrit 01/18/2023 38.8  35.0 - 47.0 % Final     MCV 01/18/2023 94  78 - 100 fL Final     MCH 01/18/2023 30.4  26.5 - 33.0 pg Final     MCHC 01/18/2023 32.5  31.5 - 36.5 g/dL Final     RDW 01/18/2023 13.2  10.0 - 15.0 % Final     Platelet Count 01/18/2023 230  150 - 450 10e3/uL Final     Total Protein Urine mg/dL 01/18/2023 6.4  1.0 - 14.0 mg/dL Final    The reference ranges have not been established in urine protein. The results should be integrated into the clinical context for interpretation.     Total Protein UR MG/MG CR 01/18/2023 0.05  0.00 - 0.20 mg/mg Cr Final     Creatinine Urine mg/dL 01/18/2023 132.4  mg/dL Final    The reference  ranges have not been established in urine creatinine. The results should be integrated into the clinical context for interpretation.     Tacrolimus by Tandem Mass Spectrom* 01/18/2023 4.3 (L)  5.0 - 15.0 ug/L Final    Comment: Tacrolimus Reference Range (ug/L):    Kidney Transplant:  Pediatric  0-3 months post transplant: 10-12  3-6 months post transplant: 8-10  6-12 months post transplant: 6-8  >12 months post transplant: 4-7    Adult  0-6 months post transplant: 8-10  6-12 months post transplant: 6-8  >12 months post transplant: 4-6  >5 years post transplant: 3-5    Heart Transplant:  Pediatric  0-12 months post transplant: 10-15  >12 months post transplant: 5-10    Adult  0-3 months post transplant: 10-15  3-6 months post transplant: 8-12  6-12 months post transplant: 6-12  >12 months post transplant: 6-10    Lung Transplant:  0-12 months post transplant: 10-15  >12 months post transplant: 8-12    Liver Transplant:  Pediatric  0-3 months post transplant: 10-15  3-6 months post transplant: 8-10  6 months-5 years post transplant: 6-8   >5 years post transplant: 1-3    Adult  0-3 months post transplant: 10-12  3-6 months post transplant: 8-10  >6 months post transplant: 6-8    Pancreas Transplant:  0-6                            months post transplant: 8-10  >6 months post transplant: 5-8     Tacrolimus Last Dose Date 01/18/2023 1/17/2023   Final     Tacrolimus Last Dose Time 01/18/2023 10:00 PM   Final     Parathyroid Hormone Intact 01/18/2023 79 (H)  15 - 65 pg/mL Final   Office Visit on 01/16/2023   Component Date Value Ref Range Status     hCG Urine Qualitative 01/16/2023 Positive (A)  Negative Final    This test is for screening purposes.  Results should be interpreted along with the clinical picture.  Confirmation testing is available if warranted by ordering [id number], HCG Quantitative Pregnancy.       ASSESSMENT:    #1 history of hypothyroidism  Congratulated patient.  I think she is on a reasonable program  with her recent increase in levothyroxine to 37 mcg daily 6 days a week, and 25 mcg daily 1 day/week.  January 2023 TFTs acceptable with a TSH at 2.19.  Discussed with patient that we need to follow her TFTs closely, including monthly TFTs, with a general goal of TSH roughly 0.3-2.5.  Instructed patient on proper administration of levothyroxine (separately from other medication, just with water) as well as the importance of close follow-up.     #2 current pregnancy  Congratulated patient.  We will screen for gestational diabetes and help manage hypothyroidism as noted above.     #3 history of prediabetes  The patient reports a history of prediabetes and is currently pregnant.  We will add hemoglobin A1c to blood drawn recently.  If this is high, she may need a oral glucose tolerance test    #4 history of renal transplant in August 2019  Per nephrology.  They are aware of her interest in pregnancy.     #5 history of high prolactin  Patient currently pregnant.  It appears that her high prolactin may have been exacerbated by her hypothyroidism.  At any rate, her recent prolactin's have been fairly normal.  No need to repeat the head MRI at this time, although we could consider doing this after she has delivered.    #6 history of hypercalcemia, likely due to tertiary hyperparathyroidism  Per nephrology, patient no longer on Sensipar.  No need to intervene unless serum calcium greater than 11.    Labs monthly.  Return visit in 3 in 6 months.  Orders Placed This Encounter   Procedures     TSH     T4 free     T3 total     Hemoglobin A1c       Again, thank you for allowing me to participate in the care of your patient.      Sincerely,    Katrin Lopez MD

## 2023-01-20 NOTE — NURSING NOTE
"Chief Complaint   Patient presents with     Thyroid Problem     Vital signs:      BP: 111/72 Pulse: 76             Weight: 73.4 kg (161 lb 12.8 oz)  Estimated body mass index is 30.57 kg/m  as calculated from the following:    Height as of 1/16/23: 1.549 m (5' 1\").    Weight as of this encounter: 73.4 kg (161 lb 12.8 oz).          "

## 2023-01-20 NOTE — PATIENT INSTRUCTIONS
"Need to draw thyroid labs monthly while pregnant-  we will adjust the thyroid hormone as needed    I added the prediabetes measure - eat more vegetables    Current thyroid program is fine    Ginger ale for nausea    Ground flax seed for constipation (or dried prunes)   -  For scheduling appointments or labs, please request an appointment through [employer] or call [phone] select option #3 for triage nurse    For questions for your provider or the endocrine nurse, please send a [employer] message or call [phone] select option #3 for triage nurse    If this is an emergency overnight or on weekends, please call [phone]. This will get you the [employer] . Please ask for adult endocrinology \"on call\" and they will connect you to one of my colleagues who will always be available.     "

## 2023-01-20 NOTE — PROGRESS NOTES
Mona called to report that she has a dating US scheduled on 2/16/23. Informed pt will have schedulers call her to schedule US and 1st OB visit week of 2/20. Pt VU and agrees with plan.  Pennie Arnett RN

## 2023-01-20 NOTE — PROGRESS NOTES
"Mona is a 32 year old female presents today for follow-up of hypothyroidism in the setting of current pregnancy, history of prediabetes, as well as renal transplant    HPI  #1 history of hypothyroidism  Report, she had a history of hypothyroidism that was diagnosed when she had \"no from her nipples\" with associated high prolactin and presumably, high TSH.  She was then placed on levothyroxine and her nipple discharge and prolactin subsequently improved    Interval history: Prior to pregnancy, the patient had been on levothyroxine 25 mcg daily 4 days a week, and 37.5 mcg daily 3 days/week.  However with positive pregnancy test a few weeks ago, she preemptively increased her levothyroxine to 37 mcg daily 6 days a week, and 25 mcg daily 1 day/week.     #2 current pregnancy  Reviewing her chart, this has been planned for many months, with the multidisciplinary team involved.  Currently, the patient is  5 weeks pregnant.  She reports oral contraceptive use from roughly 6634-7317.  Her oral contraceptive use was stopped in 2022 and she reports regular menses.  She is being followed by nephrology, and maternal-fetal medicine.  She presents today so she can include endocrinology follow-up.     #3 history of prediabetes  The patient reports history of prediabetes.  Reviewing the patient's chart, August 2019 hemoglobin A1c was 4.8, February 2020 hemoglobin A1c 5.7, in April 2020 hemoglobin A1c was 5.5.    #4 history of renal transplant in August 2019  The patient has a history of end-stage renal disease, due to suspected IgA nephropathy for which she was previously on dialysis and underwent recent on transplant in August 2019.     #5 history of high prolactin  Per chart, there is a history of prolactin at 43 in April 2016, 38 in February 2017, 38 in March 2018, 51 in June 2019, 9 in November 2019, 14 and April 2020, 14 in June 2021, 20.9 November 2021, and 17 in July 2022.  2017 had MRI did not clearly show a macroadenoma, " although microadenoma cannot be excluded.  No contrast was used.    #6 history of hypercalcemia  This is thought to due to renal related secondary hyperparathyroidism.  Had been on Sensipar, however this was stopped as she was trying to become pregnant.  Nephrology felt no need to intervene on serum calcium greater than 11.  January 2023 PTH is 79, serum calcium was 10.8      Past Medical History  Past Medical History:   Diagnosis Date     Anemia in chronic kidney disease      Ear infection 4/8/2022     History of bacterial endocarditis      History of blood transfusion      History of DVT (deep vein thrombosis) 2014     History of seizure 2014     History of subarachnoid hemorrhage      Hydronephrosis      Hyperprolactinemia (H)      Hypertension     resolved after transplant     Hypothyroidism      Kidney transplanted 08/03/2019    DCD DDKT. Induction with thymo 6mg/kg.     Orthostatic hypotension      FATOUMATA (obstructive sleep apnea)      Pyelonephritis of transplanted kidney 01/23/2020     Secondary renal hyperparathyroidism (H)      Urinary tract infection        Allergies  Allergies   Allergen Reactions     Contrast Dye Rash     CT contrast allergy 12/14/19 rash over eyes. Need to have pre medication before a CT WITH CONTRAST      Lisinopril Swelling     angioedema     Nitrous Oxide Other (See Comments)     Sense of doom     Chlorhexidine Rash     Rash at site     Sulfa Drugs Rash     Muscle stiffness of neck     Dapsone      Methemoglobinemia     Furosemide Other (See Comments)     Skin flushing     Metrogel [Metronidazole]      Hives diffusely on body after vaginal administration.     Azithromycin Dizziness and Rash     Cefuroxime Rash     Medications  Current Outpatient Medications   Medication Sig Dispense Refill     acetaminophen (TYLENOL) 325 MG tablet Take 2 tablets (650 mg) by mouth every 4 hours as needed for mild pain 100 tablet 3     aspirin (ASA) 81 MG chewable tablet Take 1 tablet (81 mg) by mouth  daily 90 tablet 1     azaTHIOprine (IMURAN) 50 MG tablet Take 3 tablets (150 mg) by mouth daily 270 tablet 3     diphenhydrAMINE (BENADRYL) 25 MG capsule Take 1 capsule (25 mg) by mouth daily as needed for itching or allergies Take 1-2 tablets by mouth every 6 hours PRN itching/allergies 60 capsule 0     EPINEPHrine (ANY BX GENERIC EQUIV) 0.3 MG/0.3ML injection 2-pack        famotidine (PEPCID) 20 MG tablet Take 1 tablet (20 mg) by mouth 2 times daily 180 tablet 3     fluticasone (FLONASE) 50 MCG/ACT nasal spray Spray 1 spray into both nostrils daily 16 g 1     hydrOXYzine (ATARAX) 10 MG tablet Take 1 tablet (10 mg) by mouth 3 times daily as needed for anxiety 20 tablet 0     levothyroxine (SYNTHROID/LEVOTHROID) 25 MCG tablet TAKE 1 TABLET BY MOUTH ON TUESDAY, THURSDAY, SATURDAY, AND SUNDAY, TAKE 1.5 TABLETS(37.5 MCG) ON MONDAY, WEDNESDAY, AND FRIDAY 105 tablet 11     polyethylene glycol (MIRALAX) 17 GM/Dose powder Take 17 g (1 capful) by mouth daily as needed for constipation 507 g 3     Prenatal Vit-Fe Fumarate-FA (PRENATAL MULTIVITAMIN W/IRON) 27-0.8 MG tablet Take 1 tablet by mouth daily 90 tablet 3     Prenatal Vit-Fe Fumarate-FA (PRENATAL VITAMINS) 28-0.8 MG TABS Take 1 tablet by mouth daily 90 tablet 3     simethicone (MYLICON) 125 MG chewable tablet Take 1 tablet (125 mg) by mouth 4 times daily as needed for intestinal gas 120 tablet 3     tacrolimus (GENERIC EQUIVALENT) 0.5 MG capsule HOLD 60 capsule 11     tacrolimus (GENERIC EQUIVALENT) 1 MG capsule TAKE 3 CAPSULE BY MOUTH 2 TIMES DAILY 180 capsule 11     Family History  family history includes Cerebrovascular Disease in her father and sister; Coronary Artery Disease in her father; Diabetes in her sister; Heart Disease in her father; Hypertension in her sister; Kidney Disease in her mother and sister; Kidney failure in her mother; Sleep Apnea in her father.  Social History  Social History     Socioeconomic History     Marital status: Single     Spouse  name: Not on file     Number of children: 0     Years of education: Not on file     Highest education level: Not on file   Occupational History     Occupation: Pharmacy Technician   Tobacco Use     Smoking status: Never     Smokeless tobacco: Never   Vaping Use     Vaping Use: Never used   Substance and Sexual Activity     Alcohol use: No     Alcohol/week: 0.0 standard drinks     Drug use: No     Sexual activity: Yes     Partners: Male   Other Topics Concern     Parent/sibling w/ CABG, MI or angioplasty before 65F 55M? Not Asked   Social History Narrative    Date of Service:5/15/2013     Name: Mona VALDOVINOS (Month, Day, Year of birth): 1990     MRN: [id number]     New Patient: Yes    Preferred Language: English     Needed: No    County of Residence: [place]    Marital Status:     Household size: 8    Number of Dependents:0     Pregnant: 0    Average Monthly Income: $ 600    Citizenship Status: Citizen    Gave Pt [employer] and [employer] applications     Social Determinants of Health     Financial Resource Strain: Not on file   Food Insecurity: Not on file   Transportation Needs: Not on file   Physical Activity: Not on file   Stress: Not on file   Social Connections: Not on file   Intimate Partner Violence: Not on file   Housing Stability: Not on file       ROS  Per HPI      Physical Exam  Blood pressure 111/72, pulse 76, weight 73.4 kg (161 lb 12.8 oz), last menstrual period 2022, not currently breastfeeding.    Exam:  GENERAL APPEARANCE: Alert and no distress  NECK: No lymphadenopathy appreciated  Thyroid: No obvious nodules palpated   CV: RRR without M/R/G  Lungs: CTA bilaterally  Abdomen: Soft, Nontender, non distended, positive bowel sounds   Neuro:  no focal deficits  Skin: No infection in feet   Mood: Normal   Lymph: neg in neck and supraclavicular area  Musculoskeletal: Left arm fistula      RESULTS  Lab on 2023   Component Date Value Ref Range Status     TSH 2023 2.19   0.30 - 4.20 uIU/mL Final     Free T4 01/18/2023 1.45  0.90 - 1.70 ng/dL Final     Sodium 01/18/2023 132 (L)  136 - 145 mmol/L Final     Potassium 01/18/2023 4.2  3.4 - 5.3 mmol/L Final     Chloride 01/18/2023 102  98 - 107 mmol/L Final     Carbon Dioxide (CO2) 01/18/2023 22  22 - 29 mmol/L Final     Anion Gap 01/18/2023 8  7 - 15 mmol/L Final     Urea Nitrogen 01/18/2023 18.9  6.0 - 20.0 mg/dL Final     Creatinine 01/18/2023 0.88  0.51 - 0.95 mg/dL Final     Calcium 01/18/2023 10.8 (H)  8.6 - 10.0 mg/dL Final     Glucose 01/18/2023 99  70 - 99 mg/dL Final     GFR Estimate 01/18/2023 90  >60 mL/min/1.73m2 Final    Effective December 21, 2021 eGFRcr in adults is calculated using the 2021 CKD-EPI creatinine equation which includes age and gender (Dillon et al., NEJM, DOI: 10.1056/OUQKlv7288083)     WBC Count 01/18/2023 7.3  4.0 - 11.0 10e3/uL Final     RBC Count 01/18/2023 4.14  3.80 - 5.20 10e6/uL Final     Hemoglobin 01/18/2023 12.6  11.7 - 15.7 g/dL Final     Hematocrit 01/18/2023 38.8  35.0 - 47.0 % Final     MCV 01/18/2023 94  78 - 100 fL Final     MCH 01/18/2023 30.4  26.5 - 33.0 pg Final     MCHC 01/18/2023 32.5  31.5 - 36.5 g/dL Final     RDW 01/18/2023 13.2  10.0 - 15.0 % Final     Platelet Count 01/18/2023 230  150 - 450 10e3/uL Final     Total Protein Urine mg/dL 01/18/2023 6.4  1.0 - 14.0 mg/dL Final    The reference ranges have not been established in urine protein. The results should be integrated into the clinical context for interpretation.     Total Protein UR MG/MG CR 01/18/2023 0.05  0.00 - 0.20 mg/mg Cr Final     Creatinine Urine mg/dL 01/18/2023 132.4  mg/dL Final    The reference ranges have not been established in urine creatinine. The results should be integrated into the clinical context for interpretation.     Tacrolimus by Tandem Mass Spectrom* 01/18/2023 4.3 (L)  5.0 - 15.0 ug/L Final    Comment: Tacrolimus Reference Range (ug/L):    Kidney Transplant:  Pediatric  0-3 months post transplant:  10-12  3-6 months post transplant: 8-10  6-12 months post transplant: 6-8  >12 months post transplant: 4-7    Adult  0-6 months post transplant: 8-10  6-12 months post transplant: 6-8  >12 months post transplant: 4-6  >5 years post transplant: 3-5    Heart Transplant:  Pediatric  0-12 months post transplant: 10-15  >12 months post transplant: 5-10    Adult  0-3 months post transplant: 10-15  3-6 months post transplant: 8-12  6-12 months post transplant: 6-12  >12 months post transplant: 6-10    Lung Transplant:  0-12 months post transplant: 10-15  >12 months post transplant: 8-12    Liver Transplant:  Pediatric  0-3 months post transplant: 10-15  3-6 months post transplant: 8-10  6 months-5 years post transplant: 6-8   >5 years post transplant: 1-3    Adult  0-3 months post transplant: 10-12  3-6 months post transplant: 8-10  >6 months post transplant: 6-8    Pancreas Transplant:  0-6                            months post transplant: 8-10  >6 months post transplant: 5-8     Tacrolimus Last Dose Date 01/18/2023 1/17/2023   Final     Tacrolimus Last Dose Time 01/18/2023 10:00 PM   Final     Parathyroid Hormone Intact 01/18/2023 79 (H)  15 - 65 pg/mL Final   Office Visit on 01/16/2023   Component Date Value Ref Range Status     hCG Urine Qualitative 01/16/2023 Positive (A)  Negative Final    This test is for screening purposes.  Results should be interpreted along with the clinical picture.  Confirmation testing is available if warranted by ordering [id number], HCG Quantitative Pregnancy.       ASSESSMENT:    #1 history of hypothyroidism  Congratulated patient.  I think she is on a reasonable program with her recent increase in levothyroxine to 37 mcg daily 6 days a week, and 25 mcg daily 1 day/week.  January 2023 TFTs acceptable with a TSH at 2.19.  Discussed with patient that we need to follow her TFTs closely, including monthly TFTs, with a general goal of TSH roughly 0.3-2.5.  Instructed patient on proper  administration of levothyroxine (separately from other medication, just with water) as well as the importance of close follow-up.     #2 current pregnancy  Congratulated patient.  We will screen for gestational diabetes and help manage hypothyroidism as noted above.     #3 history of prediabetes  The patient reports a history of prediabetes and is currently pregnant.  We will add hemoglobin A1c to blood drawn recently.  If this is high, she may need a oral glucose tolerance test    #4 history of renal transplant in August 2019  Per nephrology.  They are aware of her interest in pregnancy.     #5 history of high prolactin  Patient currently pregnant.  It appears that her high prolactin may have been exacerbated by her hypothyroidism.  At any rate, her recent prolactin's have been fairly normal.  No need to repeat the head MRI at this time, although we could consider doing this after she has delivered.    #6 history of hypercalcemia, likely due to tertiary hyperparathyroidism  Per nephrology, patient no longer on Sensipar.  No need to intervene unless serum calcium greater than 11.    Labs monthly.  Return visit in 3 in 6 months.  Orders Placed This Encounter   Procedures     TSH     T4 free     T3 total     Hemoglobin A1c

## 2023-02-06 ENCOUNTER — TELEPHONE (OUTPATIENT)
Dept: ENDOCRINOLOGY | Facility: CLINIC | Age: 33
End: 2023-02-06

## 2023-02-06 ENCOUNTER — LAB (OUTPATIENT)
Dept: LAB | Facility: HOSPITAL | Age: 33
End: 2023-02-06
Payer: MEDICARE

## 2023-02-06 DIAGNOSIS — E83.52 HYPERCALCEMIA: ICD-10-CM

## 2023-02-06 DIAGNOSIS — Z94.0 KIDNEY REPLACED BY TRANSPLANT: ICD-10-CM

## 2023-02-06 DIAGNOSIS — Z48.298 AFTERCARE FOLLOWING ORGAN TRANSPLANT: ICD-10-CM

## 2023-02-06 DIAGNOSIS — Z34.90 PREGNANT: ICD-10-CM

## 2023-02-06 DIAGNOSIS — E03.9 ACQUIRED HYPOTHYROIDISM: Primary | ICD-10-CM

## 2023-02-06 DIAGNOSIS — E03.9 ACQUIRED HYPOTHYROIDISM: ICD-10-CM

## 2023-02-06 DIAGNOSIS — R79.9 ABNORMAL FINDING OF BLOOD CHEMISTRY, UNSPECIFIED: ICD-10-CM

## 2023-02-06 DIAGNOSIS — E83.52 HYPERCALCEMIA: Primary | ICD-10-CM

## 2023-02-06 LAB
ALBUMIN MFR UR ELPH: <6 MG/DL (ref 1–14)
ANION GAP SERPL CALCULATED.3IONS-SCNC: 9 MMOL/L (ref 7–15)
BUN SERPL-MCNC: 13.1 MG/DL (ref 6–20)
CALCIUM SERPL-MCNC: 10.8 MG/DL (ref 8.6–10)
CALCIUM, IONIZED MEASURED: 1.4 MMOL/L (ref 1.11–1.3)
CHLORIDE SERPL-SCNC: 103 MMOL/L (ref 98–107)
CREAT SERPL-MCNC: 0.77 MG/DL (ref 0.51–0.95)
CREAT UR-MCNC: 23.1 MG/DL
DEPRECATED HCO3 PLAS-SCNC: 24 MMOL/L (ref 22–29)
ERYTHROCYTE [DISTWIDTH] IN BLOOD BY AUTOMATED COUNT: 13.6 % (ref 10–15)
GFR SERPL CREATININE-BSD FRML MDRD: >90 ML/MIN/1.73M2
GLUCOSE SERPL-MCNC: 94 MG/DL (ref 70–99)
HBA1C MFR BLD: 5.2 %
HCT VFR BLD AUTO: 38.1 % (ref 35–47)
HGB BLD-MCNC: 12.1 G/DL (ref 11.7–15.7)
ION CA PH 7.4: 1.38 MMOL/L (ref 1.11–1.3)
MCH RBC QN AUTO: 30.3 PG (ref 26.5–33)
MCHC RBC AUTO-ENTMCNC: 31.8 G/DL (ref 31.5–36.5)
MCV RBC AUTO: 96 FL (ref 78–100)
PH: 7.37 (ref 7.35–7.45)
PLATELET # BLD AUTO: 217 10E3/UL (ref 150–450)
POTASSIUM SERPL-SCNC: 4.6 MMOL/L (ref 3.4–5.3)
PROT/CREAT 24H UR: NORMAL MG/G{CREAT}
PTH-INTACT SERPL-MCNC: 59 PG/ML (ref 15–65)
RBC # BLD AUTO: 3.99 10E6/UL (ref 3.8–5.2)
SODIUM SERPL-SCNC: 136 MMOL/L (ref 136–145)
TACROLIMUS BLD-MCNC: 3.5 UG/L (ref 5–15)
TME LAST DOSE: ABNORMAL H
TME LAST DOSE: ABNORMAL H
TSH SERPL DL<=0.005 MIU/L-ACNC: 1.41 UIU/ML (ref 0.3–4.2)
WBC # BLD AUTO: 8.4 10E3/UL (ref 4–11)

## 2023-02-06 PROCEDURE — 36415 COLL VENOUS BLD VENIPUNCTURE: CPT

## 2023-02-06 PROCEDURE — 84443 ASSAY THYROID STIM HORMONE: CPT

## 2023-02-06 PROCEDURE — 80048 BASIC METABOLIC PNL TOTAL CA: CPT

## 2023-02-06 PROCEDURE — 83036 HEMOGLOBIN GLYCOSYLATED A1C: CPT

## 2023-02-06 PROCEDURE — 84156 ASSAY OF PROTEIN URINE: CPT

## 2023-02-06 PROCEDURE — 82330 ASSAY OF CALCIUM: CPT

## 2023-02-06 PROCEDURE — 85027 COMPLETE CBC AUTOMATED: CPT

## 2023-02-06 PROCEDURE — 83970 ASSAY OF PARATHORMONE: CPT

## 2023-02-06 PROCEDURE — 80197 ASSAY OF TACROLIMUS: CPT

## 2023-02-06 NOTE — LETTER
Patient:  Mona Wagner  :   1990  MRN:     [id number]        Ms.Nancy Wagner  [address] [address]        2023    Dear Mona    Here is your recent HgbA1c which looks NORMAL! This is really good    If you have any questions, please feel free to contact my nurse at [phone] select option #3 for triage nurse  or  option #1 for scheduling related questions.    Regards    Katrin Lopez MD     Resulted Orders   Hemoglobin A1c   Result Value Ref Range    Hemoglobin A1C 5.2 <5.7 %      Comment:      Normal <5.7%   Prediabetes 5.7-6.4%    Diabetes 6.5% or higher     Note: Adopted from ADA consensus guidelines.       [facility]

## 2023-02-06 NOTE — RESULT ENCOUNTER NOTE
Dear Mona    Here is your recent HgbA1c which looks NORMAL! This is really good    If you have any questions, please feel free to contact my nurse at [phone] select option #3 for triage nurse  or  option #1 for scheduling related questions.    Regards    Katrin Lopez MD

## 2023-02-06 NOTE — TELEPHONE ENCOUNTER
Called pt - - results relayed to pt    -  Dear Mona    Here is your recent HgbA1c which looks NORMAL! This is really good    If you have any questions, please feel free to contact my nurse at [phone] select option #3 for triage nurse  or  option #1 for scheduling related questions.    Regards    Katrin Lopez MD       Lab on 02/06/2023   Component Date Value Ref Range Status     Sodium 02/06/2023 136  136 - 145 mmol/L Final     Potassium 02/06/2023 4.6  3.4 - 5.3 mmol/L Final     Chloride 02/06/2023 103  98 - 107 mmol/L Final     Carbon Dioxide (CO2) 02/06/2023 24  22 - 29 mmol/L Final     Anion Gap 02/06/2023 9  7 - 15 mmol/L Final     Urea Nitrogen 02/06/2023 13.1  6.0 - 20.0 mg/dL Final     Creatinine 02/06/2023 0.77  0.51 - 0.95 mg/dL Final     Calcium 02/06/2023 10.8 (H)  8.6 - 10.0 mg/dL Final     Glucose 02/06/2023 94  70 - 99 mg/dL Final     GFR Estimate 02/06/2023 >90  >60 mL/min/1.73m2 Final    eGFR calculated using 2021 CKD-EPI equation.     WBC Count 02/06/2023 8.4  4.0 - 11.0 10e3/uL Final     RBC Count 02/06/2023 3.99  3.80 - 5.20 10e6/uL Final     Hemoglobin 02/06/2023 12.1  11.7 - 15.7 g/dL Final     Hematocrit 02/06/2023 38.1  35.0 - 47.0 % Final     MCV 02/06/2023 96  78 - 100 fL Final     MCH 02/06/2023 30.3  26.5 - 33.0 pg Final     MCHC 02/06/2023 31.8  31.5 - 36.5 g/dL Final     RDW 02/06/2023 13.6  10.0 - 15.0 % Final     Platelet Count 02/06/2023 217  150 - 450 10e3/uL Final     Total Protein Urine mg/dL 02/06/2023 <6.0  1.0 - 14.0 mg/dL Final    The reference ranges have not been established in urine protein. The results should be integrated into the clinical context for interpretation.     Total Protein UR MG/MG CR 02/06/2023    Final    Unable to calculate, urine creatinine or protein is outside the detectable limits.     Creatinine Urine mg/dL 02/06/2023 23.1  mg/dL Final    The reference ranges have not been established in urine creatinine. The results should be integrated into the  clinical context for interpretation.     Tacrolimus by Tandem Mass Spectrom* 02/06/2023 3.5 (L)  5.0 - 15.0 ug/L Final    Comment: Tacrolimus Reference Range (ug/L):    Kidney Transplant:  Pediatric  0-3 months post transplant: 10-12  3-6 months post transplant: 8-10  6-12 months post transplant: 6-8  >12 months post transplant: 4-7    Adult  0-6 months post transplant: 8-10  6-12 months post transplant: 6-8  >12 months post transplant: 4-6  >5 years post transplant: 3-5    Heart Transplant:  Pediatric  0-12 months post transplant: 10-15  >12 months post transplant: 5-10    Adult  0-3 months post transplant: 10-15  3-6 months post transplant: 8-12  6-12 months post transplant: 6-12  >12 months post transplant: 6-10    Lung Transplant:  0-12 months post transplant: 10-15  >12 months post transplant: 8-12    Liver Transplant:  Pediatric  0-3 months post transplant: 10-15  3-6 months post transplant: 8-10  6 months-5 years post transplant: 6-8   >5 years post transplant: 1-3    Adult  0-3 months post transplant: 10-12  3-6 months post transplant: 8-10  >6 months post transplant: 6-8    Pancreas Transplant:  0-6                            months post transplant: 8-10  >6 months post transplant: 5-8     Tacrolimus Last Dose Date 02/06/2023 2/5/2023   Final     Tacrolimus Last Dose Time 02/06/2023 10:00 PM   Final     Calcium, Ionized pH 7.4 02/06/2023 1.38 (H)  1.11 - 1.30 mmol/L Final     pH 02/06/2023 7.37  7.35 - 7.45 Final     Calcium, Ionized Measured 02/06/2023 1.40 (H)  1.11 - 1.30 mmol/L Final     Hemoglobin A1C 02/06/2023 5.2  <5.7 % Final    Normal <5.7%   Prediabetes 5.7-6.4%    Diabetes 6.5% or higher     Note: Adopted from ADA consensus guidelines.     TSH 02/06/2023 1.41  0.30 - 4.20 uIU/mL Final

## 2023-02-06 NOTE — LETTER
Patient:  Mona Wagner  :   1990  MRN:     [id number]        Ms.Nancy Wagner  [address] [address]        2023    Dear Mona     Here are your thyroid labs which are NORMAL! Please recheck in 1 month.     If you have any questions, please feel free to contact my nurse at [phone] select option #3 for triage nurse  or  option #1 for scheduling related questions.     Regards     Katrin Lopez MD    Resulted Orders   Hemoglobin A1c   Result Value Ref Range    Hemoglobin A1C 5.2 <5.7 %      Comment:      Normal <5.7%   Prediabetes 5.7-6.4%    Diabetes 6.5% or higher     Note: Adopted from ADA consensus guidelines.   TSH with free T4 reflex   Result Value Ref Range    TSH 1.41 0.30 - 4.20 uIU/mL       [facility]

## 2023-02-06 NOTE — RESULT ENCOUNTER NOTE
Dear Mona    Here are your thyroid labs which are NORMAL! Please recheck in 1 month.    If you have any questions, please feel free to contact my nurse at [phone] select option #3 for triage nurse  or  option #1 for scheduling related questions.    Regards    Katrin Lopez MD

## 2023-02-07 ENCOUNTER — TELEPHONE (OUTPATIENT)
Dept: ENDOCRINOLOGY | Facility: CLINIC | Age: 33
End: 2023-02-07
Payer: MEDICARE

## 2023-02-07 DIAGNOSIS — E06.3 HYPOTHYROIDISM DUE TO HASHIMOTO'S THYROIDITIS: ICD-10-CM

## 2023-02-07 DIAGNOSIS — E03.9 ACQUIRED HYPOTHYROIDISM: ICD-10-CM

## 2023-02-07 DIAGNOSIS — Z94.0 KIDNEY REPLACED BY TRANSPLANT: ICD-10-CM

## 2023-02-07 RX ORDER — TACROLIMUS 0.5 MG/1
CAPSULE ORAL
Qty: 60 CAPSULE | Refills: 11 | Status: SHIPPED | OUTPATIENT
Start: 2023-02-07 | End: 2023-02-08

## 2023-02-07 RX ORDER — TACROLIMUS 0.5 MG/1
CAPSULE ORAL
Qty: 60 CAPSULE | Refills: 11 | Status: SHIPPED | OUTPATIENT
Start: 2023-02-07 | End: 2023-02-07

## 2023-02-07 RX ORDER — TACROLIMUS 1 MG/1
CAPSULE ORAL
Qty: 180 CAPSULE | Refills: 11 | Status: SHIPPED | OUTPATIENT
Start: 2023-02-07 | End: 2023-02-07

## 2023-02-07 RX ORDER — LEVOTHYROXINE SODIUM 25 UG/1
TABLET ORAL
Qty: 105 TABLET | Refills: 11 | Status: SHIPPED | OUTPATIENT
Start: 2023-02-07 | End: 2023-02-07

## 2023-02-07 RX ORDER — LEVOTHYROXINE SODIUM 25 UG/1
TABLET ORAL
Qty: 105 TABLET | Refills: 11 | Status: SHIPPED | OUTPATIENT
Start: 2023-02-07 | End: 2023-02-08

## 2023-02-07 RX ORDER — TACROLIMUS 1 MG/1
CAPSULE ORAL
Qty: 180 CAPSULE | Refills: 11 | Status: SHIPPED | OUTPATIENT
Start: 2023-02-07 | End: 2023-02-08

## 2023-02-08 ENCOUNTER — TELEPHONE (OUTPATIENT)
Dept: ENDOCRINOLOGY | Facility: CLINIC | Age: 33
End: 2023-02-08
Payer: MEDICARE

## 2023-02-08 DIAGNOSIS — E03.9 ACQUIRED HYPOTHYROIDISM: ICD-10-CM

## 2023-02-08 DIAGNOSIS — Z94.0 KIDNEY REPLACED BY TRANSPLANT: ICD-10-CM

## 2023-02-08 DIAGNOSIS — E06.3 HYPOTHYROIDISM DUE TO HASHIMOTO'S THYROIDITIS: ICD-10-CM

## 2023-02-08 RX ORDER — LEVOTHYROXINE SODIUM 25 UG/1
TABLET ORAL
Qty: 150 TABLET | Refills: 3 | Status: SHIPPED | OUTPATIENT
Start: 2023-02-08 | End: 2023-05-23

## 2023-02-08 RX ORDER — TACROLIMUS 0.5 MG/1
CAPSULE ORAL
Qty: 60 CAPSULE | Refills: 11 | Status: SHIPPED | OUTPATIENT
Start: 2023-02-08 | End: 2023-04-25

## 2023-02-08 RX ORDER — TACROLIMUS 1 MG/1
CAPSULE ORAL
Qty: 180 CAPSULE | Refills: 11 | Status: SHIPPED | OUTPATIENT
Start: 2023-02-08 | End: 2023-04-25

## 2023-02-08 RX ORDER — TACROLIMUS 0.5 MG/1
CAPSULE ORAL
Qty: 60 CAPSULE | Refills: 11 | Status: SHIPPED | OUTPATIENT
Start: 2023-02-08 | End: 2023-02-08

## 2023-02-08 RX ORDER — TACROLIMUS 1 MG/1
CAPSULE ORAL
Qty: 180 CAPSULE | Refills: 11 | Status: SHIPPED | OUTPATIENT
Start: 2023-02-08 | End: 2023-02-08

## 2023-02-08 NOTE — TELEPHONE ENCOUNTER
Updated script     -  Sorry, my mistake,     I'm currently taking 1.5 tablets mon-saturday of the .25 mcg  And 1 tablet of on sunday.     Mona

## 2023-02-13 ENCOUNTER — VIRTUAL VISIT (OUTPATIENT)
Dept: NEPHROLOGY | Facility: CLINIC | Age: 33
End: 2023-02-13
Attending: INTERNAL MEDICINE
Payer: MEDICARE

## 2023-02-13 DIAGNOSIS — O09.91 HIGH-RISK PREGNANCY IN FIRST TRIMESTER: ICD-10-CM

## 2023-02-13 DIAGNOSIS — D84.9 IMMUNOSUPPRESSION (H): ICD-10-CM

## 2023-02-13 DIAGNOSIS — E21.2 TERTIARY HYPERPARATHYROIDISM (H): ICD-10-CM

## 2023-02-13 DIAGNOSIS — Z94.0 KIDNEY REPLACED BY TRANSPLANT: Primary | ICD-10-CM

## 2023-02-13 DIAGNOSIS — Z48.298 AFTERCARE FOLLOWING ORGAN TRANSPLANT: ICD-10-CM

## 2023-02-13 PROCEDURE — 99215 OFFICE O/P EST HI 40 MIN: CPT | Mod: VID | Performed by: INTERNAL MEDICINE

## 2023-02-13 PROCEDURE — G0463 HOSPITAL OUTPT CLINIC VISIT: HCPCS | Mod: PN,GT | Performed by: INTERNAL MEDICINE

## 2023-02-13 NOTE — PROGRESS NOTES
Is the patient currently in the state of [place]? YES    Visit mode:VIDEO    If the visit is dropped, the patient can be reconnected by: VIDEO VISIT: Text to cell phone: [phone]    Will anyone else be joining the visit? NO      How would you like to obtain your AVS? MyChart    Are changes needed to the allergy or medication list? NO    Comments or concerns regarding today's visit: Pregnancy and how it relates to her health and what she should look out for.     Video-Visit Details    Type of service:  Video Visit    Video Start Time (time video started): 4:02pm    Video End Time (time video stopped): 4:22pm    Originating Location (pt. Location): Home    Distant Location (provider location):  On-site    Mode of Communication:  Video Conference via "[employer]

## 2023-02-13 NOTE — PATIENT INSTRUCTIONS
Patient Recommendations:  - MFM for follow up, monitor BP (especially during headaches), continue ASA     Transplant Patient Information  Your Post Transplant Coordinator is: Angeles Leroy  For non urgent items, we encourage you to contact your coordinator/care team online via [employer]  You and your care team can also contact your transplant coordinator Monday - Friday, 8am - 5pm at [phone] (Option 2 to reach the coordinator or Option 4 to schedule an appointment).  After hours for urgent matters, please call [facility] at [phone].

## 2023-02-13 NOTE — LETTER
2/13/2023       RE: Mona Wagner  [address]  [place] [address]     Dear Colleague,    Thank you for referring your patient, Mona Wagner, to the [facility] [place] at [facility]. Please see a copy of my visit note below.    CHRONIC TRANSPLANT NEPHROLOGY VISIT    Assessment & Plan   # DDKT: Stable kidney function during 1st trimester of pregnancy. 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    - Baseline Cr ~ 0.8-1.1   - Proteinuria: Minimal (0.2-0.5 grams)   - Date DSA Last Checked: Aug/2021      Latest DSA: No   - BK Viremia: No Aug 2021   - Kidney Tx Biopsy: Sep 17, 2019; Result: No diagnostic evidence of acute rejection.     - Labs again in 1 month and then can space out to q2 months. When she becomes pregnant will increase frequency     # Immunosuppression: Tacrolimus immediate release (goal 4-6) and Azathioprine (dose 150 mg daily)    - Continue with intensive monitoring of immunosuppression for efficacy and toxicity   - Changes: No. Ok to take AZA with dinner instead of breakfast. Recently Tac increased due to low level.    # Infection Prophylaxis:   Last CD4 Level: 201 ( July 2020)   - PJP: None    # Orthostatic Hypotension: Controlled, but low at times on Florinef 0.1mg MWF prior to pregnancy. Now on hold;  Goal BP: > 100, but < 130 systolic   - Changes: No. Class C during pregnancy so would try to hold if she could tolerate     # Anemia in Chronic Renal Disease: Hgb: Stable, now normal      CHARLINE: No   - Iron studies: Replete ( 8/2020)     # Mineral Bone Disorder:   - Secondary renal hyperparathyroidism; PTH level: Normal (15-65 pg/ml)    Most recent is normal but could be considered high for calcium level    On treatment: No. If calcium continues to rise may have to consider sensipar after risk/benefit discussion  - Vitamin D; level: Low        On Supplement: No; Not on treatment due to  hypercalcemia  - Calcium; level: High        On Supplement: No. Off of sensipar due to pregnancy. Current prenatals have supplemental calcium. Will discuss with MFM other options.    # Electrolytes:   - Potassium; level: Normal        On supplement: No  - Magnesium; level: Normal        On supplement: Yes  - Bicarbonate; level: Normal        On supplement: No    # Ureteral stenosis:   -  Stent removed Feb 2021. Follows with urology . Trying to avoid further stents due to recurrent UTIs              -  Allograft US July 2021: moderate hydronephrosis unchanged with voiding. Unchanged on 7/6/22 abdominal CT   -If Scr increases during pregnancy would perform U/S as there is concern that ureteral stenosis could be exacerbated    # Hypercalcemia:   -Due to tertiary hyperparathyroidism   -Holding sensipar as she is pregnant. If calcium continues to rise may need to consider sensipar   -CT A/P 7/6/22 with 1mm non obstructing stone              -Using famotidine for GERD rather than TUMS    # Hyperprolactinemia: Normal prolactin level with last check.  Felt secondary to hypothyroidism versus kidney failure, or less likely now a pituitary microadenoma.  Followed by Endocrinology.    # First trimester of pregnancy:    - Switched from MPA to AZA               - Patient will continue aspirin 81 mg daily.                 - Patient will have to hold atorvastatin, sensipar, topical minoxidil and florinef during pregnancy   - Will need other type of prenatal vitamins w/o supplemental calcium due to hypercalcemia              - Will see MFM next week                # R Axillary node:   -Referred to general surgery for excisional biopsy in 8/2022 but decision was made to watch the node. Recommend excisional biopsy post partum.    -Asked to continue to observe size of node    # Medical Compliance: Yes     # COVID-19 Virus Review: Discussed COVID-19 virus and the potential medical risks.  Reviewed preventative health recommendations,  which includes washing hands for 20 seconds, avoid touching your face, and social distancing.  Asked patient to inform the transplant center if they are exposed or diagnosed with this virus.   -Tested positive 5/4/22    # COVID Vaccine Completed: Yes    # Transplant History:  Etiology of Kidney Failure: Unknown etiology (no kidney biopsy)  Tx: DDKT  Transplant: 8/3/2019 (Kidney)  Donor Type: Donation after Circulatory Death  Donor Class: Standard Criteria Donor  Crossmatch at time of Tx: negative  DSA at time of Tx: No  Significant changes in immunosuppression: None  Significant transplant-related complications: None    Transplant Office Phone Number: [phone]    Assessment and plan was discussed with the patient and she voiced her understanding and agreement.    Return visit: Return in about 2 months (around 4/13/2023) for Follow up.    Beckie Booth MD     Physician Attestation   I, Gelacio Mcintyre MD, personally examined and evaluated this patient.  I discussed the patient with the resident/fellow and care team, and agree with the assessment and plan of care as documented in the note on 02/13/23 .      I personally reviewed vital signs, medications, labs and imaging.  Gelacio Mcintyre MD  Date of Service (when I saw the patient): 02/13/23      I spent 46 minutes on the date of the encounter doing chart review, review of outside records, review of test results, interpretation of tests, patient visit and documentation,      Chief Complaint   Ms. Wagner is a 32 year old here for kidney transplant and immunosuppression management.     History of Present Illness      Interval history    Currently 8 weeks pregnant. Tolerating well new IS regimen. Complains of headaches in the morning and at nights. Continues to takes Famotidine for GERD. Keeping up with fluids (mainly Gatorade). Good appetite.    Home BP: 100 -110s systolic      Problem List   Patient Active Problem List   Diagnosis     DVT of upper extremity (deep  vein thrombosis) (H)     Seizure disorder (H)     Galactorrhea     Acquired hypothyroidism     Increased prolactin level     Hypomagnesemia     Infective endocarditis-history of.  1/2019.  resolved.      Aftercare following organ transplant     Immunosuppression (H)     Kidney replaced by transplant     Anxiety     Secondary renal hyperparathyroidism (H)     Vitamin D deficiency     CKD (chronic kidney disease) stage 3, GFR 30-59 ml/min (H)     Stricture or kinking of ureter     Bicornate uterus     Hydronephrosis of kidney transplant     Orthostatic hypotension     Tertiary hyperparathyroidism (H)     Ear infection       Social History   Social History     Tobacco Use     Smoking status: Never     Smokeless tobacco: Never   Vaping Use     Vaping Use: Never used   Substance Use Topics     Alcohol use: No     Alcohol/week: 0.0 standard drinks     Drug use: No       Allergies   Allergies   Allergen Reactions     Contrast Dye Rash     CT contrast allergy 12/14/19 rash over eyes. Need to have pre medication before a CT WITH CONTRAST      Lisinopril Swelling     angioedema     Nitrous Oxide Other (See Comments)     Sense of doom     Chlorhexidine Rash     Rash at site     Sulfa Drugs Rash     Muscle stiffness of neck     Dapsone      Methemoglobinemia     Furosemide Other (See Comments)     Skin flushing     Metrogel [Metronidazole]      Hives diffusely on body after vaginal administration.     Azithromycin Dizziness and Rash     Cefuroxime Rash       Medications   Current Outpatient Medications   Medication Sig     acetaminophen (TYLENOL) 325 MG tablet Take 2 tablets (650 mg) by mouth every 4 hours as needed for mild pain     aspirin (ASA) 81 MG chewable tablet Take 1 tablet (81 mg) by mouth daily     azaTHIOprine (IMURAN) 50 MG tablet Take 3 tablets (150 mg) by mouth daily     diphenhydrAMINE (BENADRYL) 25 MG capsule Take 1 capsule (25 mg) by mouth daily as needed for itching or allergies Take 1-2 tablets by mouth  every 6 hours PRN itching/allergies     EPINEPHrine (ANY BX GENERIC EQUIV) 0.3 MG/0.3ML injection 2-pack      famotidine (PEPCID) 20 MG tablet Take 1 tablet (20 mg) by mouth 2 times daily     fluticasone (FLONASE) 50 MCG/ACT nasal spray Spray 1 spray into both nostrils daily     hydrOXYzine (ATARAX) 10 MG tablet Take 1 tablet (10 mg) by mouth 3 times daily as needed for anxiety     levothyroxine (SYNTHROID/LEVOTHROID) 25 MCG tablet TAKE 1.5 tablet daily 6 days per week and 1 tablet daily on the 7th day ( will be titrating up)     polyethylene glycol (MIRALAX) 17 GM/Dose powder Take 17 g (1 capful) by mouth daily as needed for constipation     Prenatal Vit-Fe Fumarate-FA (PRENATAL MULTIVITAMIN W/IRON) 27-0.8 MG tablet Take 1 tablet by mouth daily     Prenatal Vit-Fe Fumarate-FA (PRENATAL VITAMINS) 28-0.8 MG TABS Take 1 tablet by mouth daily     simethicone (MYLICON) 125 MG chewable tablet Take 1 tablet (125 mg) by mouth 4 times daily as needed for intestinal gas     tacrolimus (GENERIC EQUIVALENT) 0.5 MG capsule Tacrolimus 0.5mg - take 1 capsule by mouth twice daily, with 1mg capsules for a total dose of 3.5 twice daily.     tacrolimus (GENERIC EQUIVALENT) 1 MG capsule Tacrolimus 1 mg - take 3 capsules by mouth twice a day, with 0.5mg capsules for total dose of 3.5mg twice daily     No current facility-administered medications for this visit.     There are no discontinued medications.     Physical Exam    LMP 12/16/2022       GENERAL APPEARANCE: alert and no distress  HENT: no obvious abnormalities on appearance  RESP: breathing appears unremarkable with normal rate, no audible wheezing or cough and no apparent shortness of breath with conversation  MS: extremities normal - no gross deformities noted  SKIN: no apparent rash and normal skin tone  NEURO: speech is clear with no obvious neurological deficits  PSYCH: mentation appears normal and affect normal      Data     Renal Latest Ref Rng & Units 2/6/2023 1/18/2023  1/9/2023   SODIUM 136 - 145 mmol/L 136 132(L) 138   Na (external) 136 - 145 mmol/L - - -   K 3.4 - 5.3 mmol/L 4.6 4.2 4.4   K (external) 3.5 - 5.0 mmol/L - - -   Cl 98 - 107 mmol/L 103 102 104   Cl (external) 98 - 107 mmol/L 103 102 104   CO2 22 - 29 mmol/L 24 22 23   CO2 (external) 22 - 31 mmol/L - - -   UREA NITROGEN 8 - 22 mg/dL - - -   UREA NITROGEN (R) 6.0 - 20.0 mg/dL 13.1 18.9 23.7(H)   BUN (external) 8 - 22 mg/dL - - -   CREATININE 0.51 - 0.95 mg/dL 0.77 0.88 0.92   Cr (external) 0.60 - 1 mg/dL - - -   Glucose 70 - 99 mg/dL 94 99 101(H)   Glucose (external) 70 - 125 mg/dL - - -   CALCIUM, TOTAL 8.6 - 10.0 mg/dL 10.8(H) 10.8(H) 11.0(H)   Ca (external) 8.5 - 10.5 mg/dL - - -   MAGNESIUM 1.6 - 2.3 mg/dL - - -   Mg (external) 1.8 - 2.6 mg/dL - - -     Bone Health Latest Ref Rng & Units 2/6/2023 1/18/2023 7/13/2022   PHOSPHORUS 2.5 - 4.5 mg/dL - - -   Phos (external) 2.5 - 4.5 mg/dL - - -   PARATHYROID HORMONE INTACT 15 - 65 pg/mL 59 79(H) -   PTHi (external) 18 - 80 pg/mL - - -   Vit D Def 20 - 75 ug/L - - 14(L)     Heme Latest Ref Rng & Units 2/6/2023 1/18/2023 1/9/2023   WBC 4.0 - 11.0 10e3/uL 8.4 7.3 6.7   WBC (external) 4.0 - 11.0 thou/uL - - -   Hgb 11.7 - 15.7 g/dL 12.1 12.6 12.8   Hgb (external) 12.0 - 16.0 g/dL - - -   Plt 150 - 450 10e3/uL 217 230 233   Plt (external) 140 - 440 thou/uL - - -   ABSOLUTE NEUTROPHIL 1.6 - 8.3 10e9/L - - -   ABSOLUTE NEUTROPHILS (EXTERNAL) 2.0 - 7.7 thou/uL - - -   ABSOLUTE LYMPHOCYTES 0.8 - 5.3 10e9/L - - -   ABSOLUTE LYMPHOCYTES (EXTERNAL) 0.8 - 4.4 thou/uL - - -   ABSOLUTE MONOCYTES 0.0 - 1.3 10e9/L - - -   ABSOLUTE MONOCYTES (EXTERNAL) 0.0 - 0.9 thou/uL - - -   ABSOLUTE EOSINOPHILS 0.0 - 0.7 10e9/L - - -   ABSOLUTE EOSINOPHILS (EXTERNAL) 0.0 - 0.4 thou/uL - - -   ABSOLUTE BASOPHILS 0.0 - 0.2 10e9/L - - -   ABSOLUTE BASOPHILS (EXTERNAL) 0.0 - 0.2 thou/uL - - -   ABS IMMATURE GRANULOCYTES 0 - 0.4 10e9/L - - -   ABSOLUTE NUCLEATED RBC - - - -     Liver Latest Ref Rng &  Units 6/22/2022 8/4/2020 2/3/2020   AP 45 - 120 U/L 110 185(H) 126   AP (external) 34 - 104 U/L - - -   TBili 0.0 - 1.0 mg/dL 0.9 0.7 0.6   BILIRUBIN, DIRECT 0.0 - 0.2 mg/dL - <0.1 0.1   ALT 0 - 45 U/L 10 19 20   ALT (external) 7 - 52 U/L - - -   AST 0 - 40 U/L 16 11 13   AST (external) 13 - 39 U/L - - -   Tot Protein 6.0 - 8.0 g/dL 8.0 7.9 7.4   Tot Protein (external) 6.4 - 8.9 g/dL - - -   ALBUMIN 3.5 - 5.0 g/dL 4.4 4.0 3.7   Albumin (external) - - - -     Pancreas Latest Ref Rng & Units 2/6/2023 6/22/2022 8/4/2020   A1C <5.7 % 5.2 - 5.5   Lipase 0 - 52 U/L - 39 -     Iron studies Latest Ref Rng & Units 7/13/2022 8/4/2020 2/17/2020   Iron 35 - 180 ug/dL 51 64 95   IRON SATURATION INDEX 15 - 46 % 24 32 42   FERRITIN 12 - 150 ng/mL 1,111(H) 1,065(H) 1,139(H)   Ferritin (external) 10 - 291 ng/mL - - -     [facility] Txp Virology Latest Ref Rng & Units 7/7/2021 6/2/2021 5/3/2021   CVM DNA Quant - - - -   CMV QUANT IU/ML CMVND:CMV DNA Not Detected [IU]/mL - - -   LOG IU/ML OF CMVQNT <2.1 [Log:IU]/mL - - -   BK Spec - Plasma Plasma Plasma   BK Res BKNEG:BK Virus DNA Not Detected copies/mL BK Virus DNA Not Detected BK Virus DNA Not Detected BK Virus DNA Not Detected   BK Log <2.7 Log copies/mL Not Calculated Not Calculated Not Calculated   EBV VCA IGG ANTIBODY U/mL - - -   EBV CAPSID ANTIBODY IGG 0.0 - 0.8 AI - - -   EBV DNA COPIES/ML EBVNEG:EBV DNA Not Detected [Copies]/mL - - -   EBV DNA LOG OF COPIES <2.7 [Log:copies]/mL - - -   Hep B Surf - - - -        Recent Labs   Lab Test 01/09/23  0947 01/18/23  1014 02/06/23  1023   DOSTAC 1/8/2023 1/17/2023 2/5/2023   TACROL 4.2* 4.3* 3.5*     Recent Labs   Lab Test 08/16/19  0807 09/03/19  0944   DOSMPA Not Provided 0840pm   MPACID 1.92 3.39   MPAG 149.2* 52.8       Is the patient currently in the state of [place]? YES    Visit mode:VIDEO    If the visit is dropped, the patient can be reconnected by: VIDEO VISIT: Text to cell phone: [phone]    Will anyone else be joining the visit?  NO      How would you like to obtain your AVS? MyChart    Are changes needed to the allergy or medication list? NO    Comments or concerns regarding today's visit: Pregnancy and how it relates to her health and what she should look out for.         Again, thank you for allowing me to participate in the care of your patient.      Sincerely,    Gelacio Mcintyre MD

## 2023-02-13 NOTE — PROGRESS NOTES
CHRONIC TRANSPLANT NEPHROLOGY VISIT    Assessment & Plan   # DDKT: Stable kidney function during 1st trimester of pregnancy. 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    - Baseline Cr ~ 0.8-1.1   - Proteinuria: Minimal (0.2-0.5 grams)   - Date DSA Last Checked: Aug/2021      Latest DSA: No   - BK Viremia: No Aug 2021   - Kidney Tx Biopsy: Sep 17, 2019; Result: No diagnostic evidence of acute rejection.     - Labs again in 1 month and then can space out to q2 months. When she becomes pregnant will increase frequency     # Immunosuppression: Tacrolimus immediate release (goal 4-6) and Azathioprine (dose 150 mg daily)    - Continue with intensive monitoring of immunosuppression for efficacy and toxicity   - Changes: No. Ok to take AZA with dinner instead of breakfast. Recently Tac increased due to low level.    # Infection Prophylaxis:   Last CD4 Level: 201 ( July 2020)   - PJP: None    # Orthostatic Hypotension: Controlled, but low at times on Florinef 0.1mg MWF prior to pregnancy. Now on hold;  Goal BP: > 100, but < 130 systolic   - Changes: No. Class C during pregnancy so would try to hold if she could tolerate     # Anemia in Chronic Renal Disease: Hgb: Stable, now normal      CHARLINE: No   - Iron studies: Replete ( 8/2020)     # Mineral Bone Disorder:   - Secondary renal hyperparathyroidism; PTH level: Normal (15-65 pg/ml)    Most recent is normal but could be considered high for calcium level    On treatment: No. If calcium continues to rise may have to consider sensipar after risk/benefit discussion  - Vitamin D; level: Low        On Supplement: No; Not on treatment due to hypercalcemia  - Calcium; level: High        On Supplement: No. Off of sensipar due to pregnancy. Current prenatals have supplemental calcium. Will discuss with MFM other options.    # Electrolytes:   - Potassium; level: Normal        On supplement: No  - Magnesium; level: Normal        On supplement: Yes  - Bicarbonate;  level: Normal        On supplement: No    # Ureteral stenosis:   -  Stent removed Feb 2021. Follows with urology . Trying to avoid further stents due to recurrent UTIs              -  Allograft US July 2021: moderate hydronephrosis unchanged with voiding. Unchanged on 7/6/22 abdominal CT   -If Scr increases during pregnancy would perform U/S as there is concern that ureteral stenosis could be exacerbated    # Hypercalcemia:   -Due to tertiary hyperparathyroidism   -Holding sensipar as she is pregnant. If calcium continues to rise may need to consider sensipar   -CT A/P 7/6/22 with 1mm non obstructing stone              -Using famotidine for GERD rather than TUMS    # Hyperprolactinemia: Normal prolactin level with last check.  Felt secondary to hypothyroidism versus kidney failure, or less likely now a pituitary microadenoma.  Followed by Endocrinology.    # First trimester of pregnancy:    - Switched from MPA to AZA               - Patient will continue aspirin 81 mg daily.                 - Patient will have to hold atorvastatin, sensipar, topical minoxidil and florinef during pregnancy   - Will need other type of prenatal vitamins w/o supplemental calcium due to hypercalcemia              - Will see MFM next week                # R Axillary node:   -Referred to general surgery for excisional biopsy in 8/2022 but decision was made to watch the node. Recommend excisional biopsy post partum.    -Asked to continue to observe size of node    # Medical Compliance: Yes     # COVID-19 Virus Review: Discussed COVID-19 virus and the potential medical risks.  Reviewed preventative health recommendations, which includes washing hands for 20 seconds, avoid touching your face, and social distancing.  Asked patient to inform the transplant center if they are exposed or diagnosed with this virus.   -Tested positive 5/4/22    # COVID Vaccine Completed: Yes    # Transplant History:  Etiology of Kidney Failure: Unknown etiology (no  kidney biopsy)  Tx: DDKT  Transplant: 8/3/2019 (Kidney)  Donor Type: Donation after Circulatory Death  Donor Class: Standard Criteria Donor  Crossmatch at time of Tx: negative  DSA at time of Tx: No  Significant changes in immunosuppression: None  Significant transplant-related complications: None    Transplant Office Phone Number: [phone]    Assessment and plan was discussed with the patient and she voiced her understanding and agreement.    Return visit: Return in about 2 months (around 4/13/2023) for Follow up.    Beckie Booth MD     Physician Attestation   I, Gelacio Mcintyre MD, personally examined and evaluated this patient.  I discussed the patient with the resident/fellow and care team, and agree with the assessment and plan of care as documented in the note on 02/13/23 .      I personally reviewed vital signs, medications, labs and imaging.  Gelacio Mcintyre MD  Date of Service (when I saw the patient): 02/13/23      I spent 46 minutes on the date of the encounter doing chart review, review of outside records, review of test results, interpretation of tests, patient visit and documentation,      Chief Complaint   Ms. Wagner is a 32 year old here for kidney transplant and immunosuppression management.     History of Present Illness      Interval history    Currently 8 weeks pregnant. Tolerating well new IS regimen. Complains of headaches in the morning and at nights. Continues to takes Famotidine for GERD. Keeping up with fluids (mainly Gatorade). Good appetite.    Home BP: 100 -110s systolic      Problem List   Patient Active Problem List   Diagnosis     DVT of upper extremity (deep vein thrombosis) (H)     Seizure disorder (H)     Galactorrhea     Acquired hypothyroidism     Increased prolactin level     Hypomagnesemia     Infective endocarditis-history of.  1/2019.  resolved.      Aftercare following organ transplant     Immunosuppression (H)     Kidney replaced by transplant     Anxiety     Secondary  renal hyperparathyroidism (H)     Vitamin D deficiency     CKD (chronic kidney disease) stage 3, GFR 30-59 ml/min (H)     Stricture or kinking of ureter     Bicornate uterus     Hydronephrosis of kidney transplant     Orthostatic hypotension     Tertiary hyperparathyroidism (H)     Ear infection       Social History   Social History     Tobacco Use     Smoking status: Never     Smokeless tobacco: Never   Vaping Use     Vaping Use: Never used   Substance Use Topics     Alcohol use: No     Alcohol/week: 0.0 standard drinks     Drug use: No       Allergies   Allergies   Allergen Reactions     Contrast Dye Rash     CT contrast allergy 12/14/19 rash over eyes. Need to have pre medication before a CT WITH CONTRAST      Lisinopril Swelling     angioedema     Nitrous Oxide Other (See Comments)     Sense of doom     Chlorhexidine Rash     Rash at site     Sulfa Drugs Rash     Muscle stiffness of neck     Dapsone      Methemoglobinemia     Furosemide Other (See Comments)     Skin flushing     Metrogel [Metronidazole]      Hives diffusely on body after vaginal administration.     Azithromycin Dizziness and Rash     Cefuroxime Rash       Medications   Current Outpatient Medications   Medication Sig     acetaminophen (TYLENOL) 325 MG tablet Take 2 tablets (650 mg) by mouth every 4 hours as needed for mild pain     aspirin (ASA) 81 MG chewable tablet Take 1 tablet (81 mg) by mouth daily     azaTHIOprine (IMURAN) 50 MG tablet Take 3 tablets (150 mg) by mouth daily     diphenhydrAMINE (BENADRYL) 25 MG capsule Take 1 capsule (25 mg) by mouth daily as needed for itching or allergies Take 1-2 tablets by mouth every 6 hours PRN itching/allergies     EPINEPHrine (ANY BX GENERIC EQUIV) 0.3 MG/0.3ML injection 2-pack      famotidine (PEPCID) 20 MG tablet Take 1 tablet (20 mg) by mouth 2 times daily     fluticasone (FLONASE) 50 MCG/ACT nasal spray Spray 1 spray into both nostrils daily     hydrOXYzine (ATARAX) 10 MG tablet Take 1 tablet (10  mg) by mouth 3 times daily as needed for anxiety     levothyroxine (SYNTHROID/LEVOTHROID) 25 MCG tablet TAKE 1.5 tablet daily 6 days per week and 1 tablet daily on the 7th day ( will be titrating up)     polyethylene glycol (MIRALAX) 17 GM/Dose powder Take 17 g (1 capful) by mouth daily as needed for constipation     Prenatal Vit-Fe Fumarate-FA (PRENATAL MULTIVITAMIN W/IRON) 27-0.8 MG tablet Take 1 tablet by mouth daily     Prenatal Vit-Fe Fumarate-FA (PRENATAL VITAMINS) 28-0.8 MG TABS Take 1 tablet by mouth daily     simethicone (MYLICON) 125 MG chewable tablet Take 1 tablet (125 mg) by mouth 4 times daily as needed for intestinal gas     tacrolimus (GENERIC EQUIVALENT) 0.5 MG capsule Tacrolimus 0.5mg - take 1 capsule by mouth twice daily, with 1mg capsules for a total dose of 3.5 twice daily.     tacrolimus (GENERIC EQUIVALENT) 1 MG capsule Tacrolimus 1 mg - take 3 capsules by mouth twice a day, with 0.5mg capsules for total dose of 3.5mg twice daily     No current facility-administered medications for this visit.     There are no discontinued medications.     Physical Exam    LMP 12/16/2022       GENERAL APPEARANCE: alert and no distress  HENT: no obvious abnormalities on appearance  RESP: breathing appears unremarkable with normal rate, no audible wheezing or cough and no apparent shortness of breath with conversation  MS: extremities normal - no gross deformities noted  SKIN: no apparent rash and normal skin tone  NEURO: speech is clear with no obvious neurological deficits  PSYCH: mentation appears normal and affect normal      Data     Renal Latest Ref Rng & Units 2/6/2023 1/18/2023 1/9/2023   SODIUM 136 - 145 mmol/L 136 132(L) 138   Na (external) 136 - 145 mmol/L - - -   K 3.4 - 5.3 mmol/L 4.6 4.2 4.4   K (external) 3.5 - 5.0 mmol/L - - -   Cl 98 - 107 mmol/L 103 102 104   Cl (external) 98 - 107 mmol/L 103 102 104   CO2 22 - 29 mmol/L 24 22 23   CO2 (external) 22 - 31 mmol/L - - -   UREA NITROGEN 8 - 22  mg/dL - - -   UREA NITROGEN (R) 6.0 - 20.0 mg/dL 13.1 18.9 23.7(H)   BUN (external) 8 - 22 mg/dL - - -   CREATININE 0.51 - 0.95 mg/dL 0.77 0.88 0.92   Cr (external) 0.60 - 1 mg/dL - - -   Glucose 70 - 99 mg/dL 94 99 101(H)   Glucose (external) 70 - 125 mg/dL - - -   CALCIUM, TOTAL 8.6 - 10.0 mg/dL 10.8(H) 10.8(H) 11.0(H)   Ca (external) 8.5 - 10.5 mg/dL - - -   MAGNESIUM 1.6 - 2.3 mg/dL - - -   Mg (external) 1.8 - 2.6 mg/dL - - -     Bone Health Latest Ref Rng & Units 2/6/2023 1/18/2023 7/13/2022   PHOSPHORUS 2.5 - 4.5 mg/dL - - -   Phos (external) 2.5 - 4.5 mg/dL - - -   PARATHYROID HORMONE INTACT 15 - 65 pg/mL 59 79(H) -   PTHi (external) 18 - 80 pg/mL - - -   Vit D Def 20 - 75 ug/L - - 14(L)     Heme Latest Ref Rng & Units 2/6/2023 1/18/2023 1/9/2023   WBC 4.0 - 11.0 10e3/uL 8.4 7.3 6.7   WBC (external) 4.0 - 11.0 thou/uL - - -   Hgb 11.7 - 15.7 g/dL 12.1 12.6 12.8   Hgb (external) 12.0 - 16.0 g/dL - - -   Plt 150 - 450 10e3/uL 217 230 233   Plt (external) 140 - 440 thou/uL - - -   ABSOLUTE NEUTROPHIL 1.6 - 8.3 10e9/L - - -   ABSOLUTE NEUTROPHILS (EXTERNAL) 2.0 - 7.7 thou/uL - - -   ABSOLUTE LYMPHOCYTES 0.8 - 5.3 10e9/L - - -   ABSOLUTE LYMPHOCYTES (EXTERNAL) 0.8 - 4.4 thou/uL - - -   ABSOLUTE MONOCYTES 0.0 - 1.3 10e9/L - - -   ABSOLUTE MONOCYTES (EXTERNAL) 0.0 - 0.9 thou/uL - - -   ABSOLUTE EOSINOPHILS 0.0 - 0.7 10e9/L - - -   ABSOLUTE EOSINOPHILS (EXTERNAL) 0.0 - 0.4 thou/uL - - -   ABSOLUTE BASOPHILS 0.0 - 0.2 10e9/L - - -   ABSOLUTE BASOPHILS (EXTERNAL) 0.0 - 0.2 thou/uL - - -   ABS IMMATURE GRANULOCYTES 0 - 0.4 10e9/L - - -   ABSOLUTE NUCLEATED RBC - - - -     Liver Latest Ref Rng & Units 6/22/2022 8/4/2020 2/3/2020   AP 45 - 120 U/L 110 185(H) 126   AP (external) 34 - 104 U/L - - -   TBili 0.0 - 1.0 mg/dL 0.9 0.7 0.6   BILIRUBIN, DIRECT 0.0 - 0.2 mg/dL - <0.1 0.1   ALT 0 - 45 U/L 10 19 20   ALT (external) 7 - 52 U/L - - -   AST 0 - 40 U/L 16 11 13   AST (external) 13 - 39 U/L - - -   Tot Protein 6.0 - 8.0  g/dL 8.0 7.9 7.4   Tot Protein (external) 6.4 - 8.9 g/dL - - -   ALBUMIN 3.5 - 5.0 g/dL 4.4 4.0 3.7   Albumin (external) - - - -     Pancreas Latest Ref Rng & Units 2/6/2023 6/22/2022 8/4/2020   A1C <5.7 % 5.2 - 5.5   Lipase 0 - 52 U/L - 39 -     Iron studies Latest Ref Rng & Units 7/13/2022 8/4/2020 2/17/2020   Iron 35 - 180 ug/dL 51 64 95   IRON SATURATION INDEX 15 - 46 % 24 32 42   FERRITIN 12 - 150 ng/mL 1,111(H) 1,065(H) 1,139(H)   Ferritin (external) 10 - 291 ng/mL - - -     UMP Txp Virology Latest Ref Rng & Units 7/7/2021 6/2/2021 5/3/2021   CVM DNA Quant - - - -   CMV QUANT IU/ML CMVND:CMV DNA Not Detected [IU]/mL - - -   LOG IU/ML OF CMVQNT <2.1 [Log:IU]/mL - - -   BK Spec - Plasma Plasma Plasma   BK Res BKNEG:BK Virus DNA Not Detected copies/mL BK Virus DNA Not Detected BK Virus DNA Not Detected BK Virus DNA Not Detected   BK Log <2.7 Log copies/mL Not Calculated Not Calculated Not Calculated   EBV VCA IGG ANTIBODY U/mL - - -   EBV CAPSID ANTIBODY IGG 0.0 - 0.8 AI - - -   EBV DNA COPIES/ML EBVNEG:EBV DNA Not Detected [Copies]/mL - - -   EBV DNA LOG OF COPIES <2.7 [Log:copies]/mL - - -   Hep B Surf - - - -        Recent Labs   Lab Test 01/09/23  0947 01/18/23  1014 02/06/23  1023   DOSTAC 1/8/2023 1/17/2023 2/5/2023   TACROL 4.2* 4.3* 3.5*     Recent Labs   Lab Test 08/16/19  0807 09/03/19  0944   DOSMPA Not Provided 0840pm   MPACID 1.92 3.39   MPAG 149.2* 52.8

## 2023-02-14 PROBLEM — I33.0 INFECTIVE ENDOCARDITIS: Status: RESOLVED | Noted: 2019-01-24 | Resolved: 2023-02-14

## 2023-02-14 PROBLEM — H66.90 EAR INFECTION: Status: RESOLVED | Noted: 2022-04-08 | Resolved: 2023-02-14

## 2023-02-14 PROBLEM — O09.91 HIGH-RISK PREGNANCY IN FIRST TRIMESTER: Status: ACTIVE | Noted: 2023-02-14

## 2023-02-16 ENCOUNTER — ANCILLARY PROCEDURE (OUTPATIENT)
Dept: ULTRASOUND IMAGING | Facility: CLINIC | Age: 33
End: 2023-02-16
Attending: STUDENT IN AN ORGANIZED HEALTH CARE EDUCATION/TRAINING PROGRAM
Payer: MEDICARE

## 2023-02-16 DIAGNOSIS — O09.90 HIGH-RISK PREGNANCY, UNSPECIFIED TRIMESTER: ICD-10-CM

## 2023-02-16 PROCEDURE — 76801 OB US < 14 WKS SINGLE FETUS: CPT | Mod: TC | Performed by: RADIOLOGY

## 2023-02-21 ENCOUNTER — LAB (OUTPATIENT)
Dept: LAB | Facility: HOSPITAL | Age: 33
End: 2023-02-21
Payer: MEDICARE

## 2023-02-21 DIAGNOSIS — Z94.0 KIDNEY REPLACED BY TRANSPLANT: ICD-10-CM

## 2023-02-21 DIAGNOSIS — Z48.298 AFTERCARE FOLLOWING ORGAN TRANSPLANT: ICD-10-CM

## 2023-02-21 DIAGNOSIS — Z34.90 PREGNANT: ICD-10-CM

## 2023-02-21 LAB
ALBUMIN MFR UR ELPH: <6 MG/DL (ref 1–14)
ANION GAP SERPL CALCULATED.3IONS-SCNC: 10 MMOL/L (ref 7–15)
BUN SERPL-MCNC: 16.9 MG/DL (ref 6–20)
CALCIUM SERPL-MCNC: 11.2 MG/DL (ref 8.6–10)
CHLORIDE SERPL-SCNC: 102 MMOL/L (ref 98–107)
CREAT SERPL-MCNC: 0.81 MG/DL (ref 0.51–0.95)
CREAT UR-MCNC: 42.4 MG/DL
DEPRECATED HCO3 PLAS-SCNC: 22 MMOL/L (ref 22–29)
ERYTHROCYTE [DISTWIDTH] IN BLOOD BY AUTOMATED COUNT: 13.3 % (ref 10–15)
GFR SERPL CREATININE-BSD FRML MDRD: >90 ML/MIN/1.73M2
GLUCOSE SERPL-MCNC: 93 MG/DL (ref 70–99)
HCT VFR BLD AUTO: 37.7 % (ref 35–47)
HGB BLD-MCNC: 12.3 G/DL (ref 11.7–15.7)
MCH RBC QN AUTO: 30.4 PG (ref 26.5–33)
MCHC RBC AUTO-ENTMCNC: 32.6 G/DL (ref 31.5–36.5)
MCV RBC AUTO: 93 FL (ref 78–100)
PLATELET # BLD AUTO: 208 10E3/UL (ref 150–450)
POTASSIUM SERPL-SCNC: 4.3 MMOL/L (ref 3.4–5.3)
PROT/CREAT 24H UR: NORMAL MG/G{CREAT}
RBC # BLD AUTO: 4.04 10E6/UL (ref 3.8–5.2)
SODIUM SERPL-SCNC: 134 MMOL/L (ref 136–145)
TACROLIMUS BLD-MCNC: 4.6 UG/L (ref 5–15)
TME LAST DOSE: ABNORMAL H
TME LAST DOSE: ABNORMAL H
WBC # BLD AUTO: 9.8 10E3/UL (ref 4–11)

## 2023-02-21 PROCEDURE — 85014 HEMATOCRIT: CPT

## 2023-02-21 PROCEDURE — 84156 ASSAY OF PROTEIN URINE: CPT

## 2023-02-21 PROCEDURE — 80048 BASIC METABOLIC PNL TOTAL CA: CPT

## 2023-02-21 PROCEDURE — 36415 COLL VENOUS BLD VENIPUNCTURE: CPT

## 2023-02-21 PROCEDURE — 80197 ASSAY OF TACROLIMUS: CPT

## 2023-02-22 ENCOUNTER — TELEPHONE (OUTPATIENT)
Dept: ENDOCRINOLOGY | Facility: CLINIC | Age: 33
End: 2023-02-22
Payer: MEDICARE

## 2023-02-22 ENCOUNTER — PRE VISIT (OUTPATIENT)
Dept: MATERNAL FETAL MEDICINE | Facility: CLINIC | Age: 33
End: 2023-02-22
Payer: MEDICARE

## 2023-02-22 NOTE — TELEPHONE ENCOUNTER
Question by nephrology: Patient's calcium continues to increase.  Nephrology asked if Cinacalcet could be an option for patient.  Data in the literature is limited.  However use in pregnancy has been reported with improvement in calcium levels. SEE PMID: [id number]    Calcimemetics, namely cinacalcet,  is categorized as class C in pregnancy and has been shown to cross the placenta. Data on long-term safety in pregnancy are unfortunately lacking    Surgery is advised during the 2nd trimester of pregnancy in the presence of severe hypercalcemia (calcium adjusted for albumin greater than 3.0 mmol/L (12.0 mg/dL))    Given these risk, I think it would be reasonable to try the the Cinacalcet as long as the patient is aware that long-term effects remain unknown with consideration of pregnancy in the second trimester for calcium continues to climb.

## 2023-02-23 DIAGNOSIS — Z94.0 KIDNEY REPLACED BY TRANSPLANT: Primary | ICD-10-CM

## 2023-02-23 DIAGNOSIS — E83.52 HYPERCALCEMIA: ICD-10-CM

## 2023-02-23 RX ORDER — CINACALCET 30 MG/1
30 TABLET, FILM COATED ORAL EVERY OTHER DAY
Qty: 15 TABLET | Refills: 11 | Status: SHIPPED | OUTPATIENT
Start: 2023-02-23 | End: 2023-04-13

## 2023-02-28 ENCOUNTER — HOSPITAL ENCOUNTER (OUTPATIENT)
Dept: ULTRASOUND IMAGING | Facility: CLINIC | Age: 33
Discharge: HOME | End: 2023-02-28
Attending: OBSTETRICS & GYNECOLOGY
Payer: MEDICARE

## 2023-02-28 ENCOUNTER — OFFICE VISIT (OUTPATIENT)
Dept: MATERNAL FETAL MEDICINE | Facility: CLINIC | Age: 33
End: 2023-02-28
Attending: STUDENT IN AN ORGANIZED HEALTH CARE EDUCATION/TRAINING PROGRAM
Payer: MEDICARE

## 2023-02-28 ENCOUNTER — LAB (OUTPATIENT)
Dept: LAB | Facility: HOSPITAL | Age: 33
End: 2023-02-28
Payer: MEDICARE

## 2023-02-28 ENCOUNTER — OFFICE VISIT (OUTPATIENT)
Dept: MATERNAL FETAL MEDICINE | Facility: CLINIC | Age: 33
End: 2023-02-28
Attending: OBSTETRICS & GYNECOLOGY
Payer: MEDICARE

## 2023-02-28 VITALS
HEART RATE: 80 BPM | OXYGEN SATURATION: 100 % | DIASTOLIC BLOOD PRESSURE: 73 MMHG | RESPIRATION RATE: 18 BRPM | SYSTOLIC BLOOD PRESSURE: 106 MMHG

## 2023-02-28 DIAGNOSIS — Z34.90 SUPERVISION OF NORMAL PREGNANCY: ICD-10-CM

## 2023-02-28 DIAGNOSIS — Z34.91 PRENATAL CARE IN FIRST TRIMESTER: ICD-10-CM

## 2023-02-28 DIAGNOSIS — I39 ENDOCARDITIS AND HEART VALVE DISORDERS IN DISEASES CLASSIFIED ELSEWHERE: ICD-10-CM

## 2023-02-28 DIAGNOSIS — E03.9 ACQUIRED HYPOTHYROIDISM: ICD-10-CM

## 2023-02-28 DIAGNOSIS — I82.629 DVT OF UPPER EXTREMITY (DEEP VEIN THROMBOSIS) (H): ICD-10-CM

## 2023-02-28 DIAGNOSIS — Z94.0 KIDNEY REPLACED BY TRANSPLANT: ICD-10-CM

## 2023-02-28 DIAGNOSIS — Z86.79 HISTORY OF BACTERIAL ENDOCARDITIS: ICD-10-CM

## 2023-02-28 DIAGNOSIS — Z34.90 SUPERVISION OF NORMAL PREGNANCY: Primary | ICD-10-CM

## 2023-02-28 DIAGNOSIS — O23.41 URINARY TRACT INFECTION IN MOTHER DURING FIRST TRIMESTER OF PREGNANCY: ICD-10-CM

## 2023-02-28 DIAGNOSIS — I82.90 DEEP VEIN THROMBOSIS (DVT) OF NON-EXTREMITY VEIN, UNSPECIFIED CHRONICITY: Primary | ICD-10-CM

## 2023-02-28 DIAGNOSIS — R79.89 INCREASED PROLACTIN LEVEL: ICD-10-CM

## 2023-02-28 DIAGNOSIS — G47.33 OSA (OBSTRUCTIVE SLEEP APNEA): ICD-10-CM

## 2023-02-28 DIAGNOSIS — Z48.22 ENCOUNTER FOR AFTERCARE FOLLOWING KIDNEY TRANSPLANT: ICD-10-CM

## 2023-02-28 DIAGNOSIS — E83.52 HYPERCALCEMIA: ICD-10-CM

## 2023-02-28 DIAGNOSIS — Z86.718 HISTORY OF MATERNAL DEEP VEIN THROMBOSIS (DVT): ICD-10-CM

## 2023-02-28 DIAGNOSIS — O09.91 HIGH-RISK PREGNANCY IN FIRST TRIMESTER: ICD-10-CM

## 2023-02-28 DIAGNOSIS — Z87.440 HISTORY OF RECURRENT UTIS: ICD-10-CM

## 2023-02-28 DIAGNOSIS — Z87.59 HISTORY OF MATERNAL DEEP VEIN THROMBOSIS (DVT): ICD-10-CM

## 2023-02-28 DIAGNOSIS — Z86.79 HISTORY OF ENDOCARDITIS: ICD-10-CM

## 2023-02-28 LAB
ANION GAP SERPL CALCULATED.3IONS-SCNC: 9 MMOL/L (ref 7–15)
BUN SERPL-MCNC: 17.7 MG/DL (ref 6–20)
CALCIUM SERPL-MCNC: 10.5 MG/DL (ref 8.6–10)
CHLORIDE SERPL-SCNC: 102 MMOL/L (ref 98–107)
CREAT SERPL-MCNC: 0.8 MG/DL (ref 0.51–0.95)
DEPRECATED HCO3 PLAS-SCNC: 22 MMOL/L (ref 22–29)
ERYTHROCYTE [DISTWIDTH] IN BLOOD BY AUTOMATED COUNT: 13.5 % (ref 10–15)
GFR SERPL CREATININE-BSD FRML MDRD: >90 ML/MIN/1.73M2
GLUCOSE SERPL-MCNC: 95 MG/DL (ref 70–99)
HCT VFR BLD AUTO: 37.5 % (ref 35–47)
HGB BLD-MCNC: 12.2 G/DL (ref 11.7–15.7)
MCH RBC QN AUTO: 30.3 PG (ref 26.5–33)
MCHC RBC AUTO-ENTMCNC: 32.5 G/DL (ref 31.5–36.5)
MCV RBC AUTO: 93 FL (ref 78–100)
PLATELET # BLD AUTO: 206 10E3/UL (ref 150–450)
POTASSIUM SERPL-SCNC: 4.4 MMOL/L (ref 3.4–5.3)
RBC # BLD AUTO: 4.02 10E6/UL (ref 3.8–5.2)
SODIUM SERPL-SCNC: 133 MMOL/L (ref 136–145)
T3 SERPL-MCNC: 207 NG/DL (ref 85–202)
T4 FREE SERPL-MCNC: 1.28 NG/DL (ref 0.9–1.7)
TSH SERPL DL<=0.005 MIU/L-ACNC: 0.37 UIU/ML (ref 0.3–4.2)
WBC # BLD AUTO: 9.8 10E3/UL (ref 4–11)

## 2023-02-28 PROCEDURE — G0463 HOSPITAL OUTPT CLINIC VISIT: HCPCS | Performed by: STUDENT IN AN ORGANIZED HEALTH CARE EDUCATION/TRAINING PROGRAM

## 2023-02-28 PROCEDURE — 84439 ASSAY OF FREE THYROXINE: CPT

## 2023-02-28 PROCEDURE — 76801 OB US < 14 WKS SINGLE FETUS: CPT | Mod: 26 | Performed by: STUDENT IN AN ORGANIZED HEALTH CARE EDUCATION/TRAINING PROGRAM

## 2023-02-28 PROCEDURE — 80048 BASIC METABOLIC PNL TOTAL CA: CPT

## 2023-02-28 PROCEDURE — 76801 OB US < 14 WKS SINGLE FETUS: CPT

## 2023-02-28 PROCEDURE — 85041 AUTOMATED RBC COUNT: CPT

## 2023-02-28 PROCEDURE — 99205 OFFICE O/P NEW HI 60 MIN: CPT | Mod: 25 | Performed by: STUDENT IN AN ORGANIZED HEALTH CARE EDUCATION/TRAINING PROGRAM

## 2023-02-28 PROCEDURE — 85027 COMPLETE CBC AUTOMATED: CPT

## 2023-02-28 PROCEDURE — 36415 COLL VENOUS BLD VENIPUNCTURE: CPT

## 2023-02-28 PROCEDURE — 84480 ASSAY TRIIODOTHYRONINE (T3): CPT

## 2023-02-28 PROCEDURE — 999N000069 HC STATISTIC GENETIC COUNSELING, < 16 MIN: Performed by: GENETIC COUNSELOR, MS

## 2023-02-28 PROCEDURE — 84443 ASSAY THYROID STIM HORMONE: CPT

## 2023-02-28 NOTE — LETTER
March 2, 2023      Mona Wagner  [address]   [place] [address]        Dear Mona    Here are your lab results.  I am okay with the T3 being higher-we see this with pregnancy.  The more important test, the TSH and free T4, are normal.  We will check free T3 is moving forward.  There is nothing you need to do at this time.  I will adjust this on the lab test.  Please recheck in 1 month.    If you have any questions, please feel free to contact my nurse at [phone] select option #3 for triage nurse  or  option #1 for scheduling related questions.    Regards    Katrin Lopez MD     Resulted Orders   TSH   Result Value Ref Range    TSH 0.37 0.30 - 4.20 uIU/mL   T4 free   Result Value Ref Range    Free T4 1.28 0.90 - 1.70 ng/dL   T3 total   Result Value Ref Range    T3 Total 207 (H) 85 - 202 ng/dL       If you have any questions or concerns, please call the clinic at the number listed above.       Sincerely,      Katrin Lopez MD

## 2023-02-28 NOTE — PROGRESS NOTES
[facility]  Genetic Counseling Consult    Patient:  Mona Wagner YOB: 1990   Date of Service:  23   MRN: [id number]    Mona was seen at the [facility] for genetic consultation. The indication for genetic counseling is desire to discuss options for genetic screening and diagnostics. The patient was accompanied to this visit by their partner. The patient is wearing a mask due to current [facility] policies.     IMPRESSION/ PLAN   Mona was seen today for a [facility] consult to discuss pregnancy management due to her kidney transplant. Please see [facility] consult for additional information.     I was asked to meet with Mona today to discuss aneuploidy screening in pregnancy. She was too early for NT assessment today but plans to return in two weeks for a NT ultrasound. She will also have labs drawn that morning. After our discussion, Mona would also like to have NIPT drawn when she returns on 3/15/23. The patient has already completed the consent form and orders are in the chart. She will be provided a collection kit after her NT ultrasound.    Given Mona's history of solid organ transplant, which she believes was a male donor, sex chromosome screening will not be included on her NIPT.     PREGNANCY HISTORY   /Parity:       CURRENT PREGNANCY   Current Age: 32 year old   Age at Delivery: 32 year old  KRISSY: 2023, by Last Menstrual Period                                   Gestational Age: 10w4d    MEDICAL HISTORY   Please see [facility] consult. Mona's history is significant for kidney transplant.       FAMILY HISTORY   A three-generation pedigree was not obtained today due to our focus on other topics.       RISK ASSESSMENT FOR CHROMOSOME CONDITIONS   We explained that the risk for fetal chromosome abnormalities increases with maternal age. We discussed specific features of common chromosome abnormalities, including  Down syndrome, trisomy 13, trisomy 18, and sex chromosome trisomies.      At age 32 at midtrimester, the risk to have a baby with Down syndrome is 1 in 690.     At age 32 at midtrimester, the risk to have a baby with any chromosome abnormality is 1 in 345.       GENETIC TESTING OPTIONS   Genetic testing during a pregnancy includes screening and diagnostic procedures.      Screening tests are non-invasive which means no risk to the pregnancy and includes ultrasounds and blood work. The benefits and limitations of screening were reviewed. Screening tests provide a risk assessment (chance) specific to the pregnancy for certain fetal chromosome abnormalities but cannot definitively diagnose or exclude a fetal chromosome abnormality. Follow-up genetic counseling and consideration of diagnostic testing is recommended with any abnormal screening result. Diagnostic testing during a pregnancy is more certain and can test for more conditions. However, the tests do have a risk of miscarriage that requires careful consideration. These tests can detect fetal chromosome abnormalities with greater than 99% certainty. Results can be compromised by maternal cell contamination or mosaicism and are limited by the resolution of current genetic testing technology.     There is no screening or diagnostic test that detects all forms of birth defects or intellectual disability.     We discussed the following screening options:   First trimester screening    Ultrasound between [id number] that includes nuchal translucency measurement and nasal bone assessments    Nuchal translucency refers to the space at the back of the neck where fluid builds up. All babies at this stage have fluid and there is only concern if there is too much fluid    Nasal bone refers to the small bone in the nose. There is concern for conditions like Down syndrome if the bone cannot be seen at all    Blood work from arm for levels of hCG and SHANA-A    Screens for  trisomy 21 and trisomy 18    Cannot screen for open neural tube defects, maternal serum AFP after 15 weeks is recommended    Non-invasive prenatal testing (NIPT)    Also called cell-free DNA screening because it detects chromosomes from the placenta in the pregnant person's blood    Can be done any time after 10 weeks gestation    Screens for trisomy 21, trisomy 18, trisomy 13, and sex chromosome aneuploidies    Cannot screen for open neural tube defects, maternal serum AFP after 15 weeks is recommended      We discussed the following ultrasound options:  Nuchal translucency (NT) ultrasound    Ultrasound between [id number] that includes nuchal translucency measurement and nasal bone assessments    Nuchal translucency refers to the space at the back of the neck where fluid builds up. All babies at this stage have fluid and there is only concern if there is too much fluid    Nasal bone refers to the small bone in the nose. There is concern for conditions like Down syndrome if the bone cannot be seen at all    This ultrasound can be done as part of first trimester screening, at the same time as another screen (NIPT), at the same time as a CVS, or if the patients does not want genetic screening.    Markers on ultrasound detects about 70% of pregnancies with aneuploidy    Abnormalities on NT ultrasound can also increase the risk for a birth defect, like a heart defect      We discussed the following diagnostic options:   Chorionic villus sampling (CVS)    Invasive diagnostic procedure done between 10w0d and 13w6d    The procedure collects a small sample from the placenta for the purpose of chromosomal testing and/or other genetic testing    Diagnostic result; more than 99% sensitivity for fetal chromosome abnormalities    Cannot screen for open neural tube defects, maternal serum AFP after 15 weeks is recommended    Amniocentesis    Invasive diagnostic procedure done after 15 weeks gestation    The procedure collects a  small sample of amniotic fluid for the purpose of chromosomal testing and/or other genetic testing    Diagnostic result; more than 99% sensitivity for fetal chromosome abnormalities    Testing for AFP in the amniotic fluid can test for open neural tube defects        It was a pleasure to be involved with Mona wheat. Face-to-face time of the meeting was 15 minutes.    Rosanna Victoria GC, MS, [facility]  Certified and [place] Licensed Genetic Counselor  [facility]  Maternal Fetal Medicine  Office: [phone]  [facility]: [phone]   Fax: [phone]  [facility]

## 2023-02-28 NOTE — PROGRESS NOTES
Maternal-Fetal Medicine Consultation    Mona Wagner  : 1990  MRN: [id number]    REFERRAL:  Mona Wagner is a 32 year old sent by Dr. Wilson from [facility] for consultation.    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  Prior to transplant with history of hypertension, anemia of chronic disease, DVT of upper extremity associated with dialysis catheter, 1 seizure associated with SAH in  while on anticoagulation, hyperprolactinemia, pyelonephritis of transplant, bacterial endocarditis.    She is here with her partner.    She was seen for MFM preconception consultation 2020 and 2022; please see those notes for complete details of prior counseling.  Today's visit will serve as consultation for this pregnancy with plan of care outlined.  Next visit will be new OB visit with new OB labs, pap smear, early 1 hr glucose challenge test.    Ms. Wagner has a history of ESRD due to suspected IgA nephropathy and was previously on dialysis.  She underwent  donor kidney transplant in 2019 with Dr. Johnson.  She follows with Dr. Hill and Dr. Mcintyre, last visit 2023.  The graft is located in the right iliac fossa.  At last nephrology visit stable kidney function was noted with baseline creatinine 0.8-1.1, most recently 0.8.  Minimal proteinuria (last urine protein/creatinine TLTC).  No history of BK viremia.  Kidney transplant biopsy 2019 without evidence of acute rejection.  She is on Tacrolimus and Azathioprine.  Last CD4 level 201 in 2020.  She has secondary renal hyperparathyroidism with recently normal PTH level; not currently on treatment but was previously on Cinacalcet.  She is not on vitamin D or calcium supplementation other than PNV.  Vitamin D  low, calcium 10.5.  Electrolytes WNL.  She is not on bicarbonate.  ABO and antibody screen 08/2019 O positive negative antibody.  She still has fistula in place and plans to keep this in place indefinitely.    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  She has had stenting at times, removed last 02/2021.  Imaging has not shown any evidence of filling defects or strictures.  She is trying to avoid further stents due to recurrent UTIs.  Allograft US 07/2021 with moderate hydronephrosis unchanged with voiding.  This was unchanged on CT 07/2022.  She follows closely with urology, last visit with Dr. Britt 05/2021.  Per their documentation at that time renal US showed stable moderate hydronephrosis, stable Creatinine, and no obstruction on Lasix renogram.  Per Dr. Britt, potential risk for worsening hydronephrosis with extrinsic compression in pregnancy and would recommend imaging if Creatinine rises.    Regarding her cardiac status, she has a history of bacterial endocarditis of pulmonary valve.  This was successfully treated in 01/2019 with 4 weeks of IV antibiotics.  She had an echocardiogram 02/2019 that showed resolution of vegetation.  She saw Dr. Toledo of cardiology for evaluation 12/7/21 at which time she reported not being particularly active and some shortness of breath with exertion such as stair climbing.  She also reported some mild chest discomfort in her left upper chest every couple of months lasting a few minutes.  This is not predictably brought on with physical activity.  She denied palpitations, syncope.  Dr. Toledo noted a systolic murmur and recommended a cardiac stress echocardiogram. Echo performed 2/11/22 with EF 60-65%, normal LV wall motion and function, normal RV size and systolic function, no hemodynamically significant valvular abnormalities.  Stress echo completed 1/24/22 with patient achieving 79% of maximum  predicted HR, reduced exercise tolerance for age.  This was non diagnostic given less than 85% of maximum heart rate achieved however no changes to suggest ischemia.    She was treated for hypertension while on dialysis but following transplant has not needed antihypertensives.  At times has orthostatic hypotension for which she took Florinef 0.1 mg MWF prior to pregnancy.  She previously took atorvastatin but has stopped in pregnancy.  ECG 01/2020 with sinus tachycardia, incomplete R BBB, no significant ST-T wave changes.    She is on CPAP for FATOUMATA.    She follows with Dr. Coles and Dr. Lopez of endocrinology, last visit 1/20/23.  At that time her history of hypothyroidism, hyperprolactinemia, and possible pituitary adenoma were discussed.  In brief, she initially presented with galactorrhea that improved upon starting thyroid hormone replacement in 2014.  At that time prolactin levels were elevated but stable.  Per endocrinology, ESRD at that time could explain the elevated prolactin levels relative to low clearance.  MRI w/ contrast in 2017 showed a possible 6.7-5.3 mm microadenoma that was not confirmed in the subsequent MRI.  However, due to her kidney dysfunction, the study had to be done without contrast.  Endocrinology stated that it was unclear if the patient truly had a prolactin producing microadenoma or her hyperprolactinemia was due to ESRD and/or hypothyroidism.  The patient never saw a neurosurgeon or experienced peripheral vision changes.  Most recent prolactin 17 in 07/2022.  Per endocrinology, the normalization of her PRL levels with treatment of hypothyroidism and normalization of her kidney function after her transplant suggests that the patient does not have a pituitary adenoma that is secreting prolactin, and that this was rather functional hyperprolactinemia, likely related to hypothyroidism and end-stage kidney disease.  Plan is to continue to monitor annually and consider treatment with  cabergoline if indicated in the future.    She takes levothyroxine regularly, currently on 37 mcg 6 times per week and 25 mcg 1 times per week.  Last TSH 0.37 02/28/2023.    She reports a history of pre-diabetes.  A1C 08/2019 4.8, 02/20 5.7, 04/20 5.5.    She does have a history of upper extremity DVT associated with dialysis catheter site in 2014 for which she was on anticoagulation.  She previously had testing including cardiolipin, lupus anticoagulant, Factor 2 and Factor V Leiden testing that were negative.  She also had two seizures in 2014 associated with SAH while on anticoagulation.  She was on Keppra for years, but was seen by neurology and this was discontinued.  She has had no further seizures or DVT/PE.    She notes history of bicornuate uterus.    She is doing well this pregnancy and denies vaginal bleeding, abdominal pain, vaginal discharge.  Is feeling tired.    Ob/Gynecologic History:  - Menstrual history: Patient's last menstrual period was 12/16/2022.  - Last Pap: 03/2020 NILM HPV negative  - Denies any history of abnormal pap smears  - Denies prior cervical surgery or procedures  - Denies any history of vaginal infection or STIs    Past Medical History:  Past Medical History:   Diagnosis Date     Anemia in chronic kidney disease      History of bacterial endocarditis      History of blood transfusion      History of DVT (deep vein thrombosis) 2014     History of seizure 2014     History of subarachnoid hemorrhage      Hydronephrosis      Hypothyroidism      Kidney transplanted 08/03/2019    DCD DDKT. Induction with thymo 6mg/kg.     Orthostatic hypotension      FATOUMATA (obstructive sleep apnea)      Pyelonephritis of transplanted kidney 01/23/2020     Secondary renal hyperparathyroidism (H)      Urinary tract infection        Past Surgical History:  Past Surgical History:   Procedure Laterality Date     BENCH KIDNEY N/A 8/3/2019    Procedure: BACKBENCH PREPARATION, ALLOGRAFT, KIDNEY;  Surgeon: Alex  Dorian Whyte MD;  Location: [facility] OR     COMBINED CYSTOSCOPY, RETROGRADES, EXCHANGE STENT URETER(S) Right 2020    Procedure: CYSTOSCOPY, CYSOTGRAM, WITH RETROGRADE PYELOGRAM, ureteroscopy,  AND URETERAL STENT;  Surgeon: Wally Britt MD;  Location: [facility] OR     COMBINED CYSTOSCOPY, RETROGRADES, URETEROSCOPY, LASER HOLMIUM LITHOTRIPSY URETER(S), INSERT STENT N/A 2019    Procedure: CYSTOURETEROSCOPY, WITH RETROGRADE PYELOGRAM of transplant kidney, STENT INSERTION, Laser on standby;  Surgeon: Wally Britt MD;  Location: [facility]     CREATE FISTULA ARTERIOVENOUS UPPER EXTREMITY  2014    Procedure: CREATE FISTULA ARTERIOVENOUS UPPER EXTREMITY;  Left Wrist Arteriovenous Fistula Placement;  Surgeon: Shashi Castro MD;  Location: [facility] OR     CREATE FISTULA ARTERIOVENOUS UPPER EXTREMITY       CYSTOSCOPY, RETROGRADES, INSERT STENT URETER(S), COMBINED N/A 2020    Procedure: CYSTOSCOPY, WITH RETROGRADE PYELOGRAM, CYSTOGRAM AND URETERAL STENT INSERTION - TRANSPLANT KIDNEY;  Surgeon: Wally Britt MD;  Location: [facility]     IR CEREBRAL ANGIOGRAM  2014     PERCUTANEOUS BIOPSY KIDNEY Right 2019    Procedure: Right Kidney Biopsy;  Surgeon: Deny Rios MD;  Location: [facility] OR     RASTA/DIALYSIS CATHETER  12/10/2013          TRANSPLANT KIDNEY RECIPIENT  DONOR N/A 8/3/2019    Procedure: TRANSPLANT, KIDNEY, RECIPIENT,  DONOR with Ureteral Stent Placement;  Surgeon: Dorian Johnson MD;  Location: [facility] OR       Current Medications:  Current Outpatient Medications   Medication Instructions     acetaminophen (TYLENOL) 650 mg, Oral, EVERY 4 HOURS PRN     aspirin (ASA) 81 mg, Oral, DAILY     azaTHIOprine (IMURAN) 150 mg, Oral, DAILY     diphenhydrAMINE (BENADRYL) 25 mg, Oral, DAILY PRN, Take 1-2 tablets by mouth every 6 hours PRN itching/allergies     EPINEPHrine (ANY BX GENERIC EQUIV) 0.3 MG/0.3ML injection 2-pack No dose, route, or frequency recorded.     famotidine (PEPCID)  20 mg, Oral, 2 TIMES DAILY     fluticasone (FLONASE) 50 MCG/ACT nasal spray 1 spray, Both Nostrils, DAILY     hydrOXYzine (ATARAX) 10 mg, Oral, 3 TIMES DAILY PRN     levothyroxine (SYNTHROID/LEVOTHROID) 25 MCG tablet TAKE 1.5 tablet daily 6 days per week and 1 tablet daily on the 7th day ( will be titrating up)     polyethylene glycol (MIRALAX) 17 GM/Dose powder 1 capful., Oral, DAILY PRN     Prenatal Vit-Fe Fumarate-FA (PRENATAL MULTIVITAMIN W/IRON) 27-0.8 MG tablet 1 tablet, Oral, DAILY     Prenatal Vit-Fe Fumarate-FA (PRENATAL VITAMINS) 28-0.8 MG TABS 1 tablet, Oral, DAILY     simethicone (MYLICON) 125 mg, Oral, 4 TIMES DAILY PRN     tacrolimus (GENERIC EQUIVALENT) 0.5 MG capsule Tacrolimus 0.5mg - take 1 capsule by mouth twice daily, with 1mg capsules for a total dose of 3.5 twice daily.     tacrolimus (GENERIC EQUIVALENT) 1 MG capsule Tacrolimus 1 mg - take 3 capsules by mouth twice a day, with 0.5mg capsules for total dose of 3.5mg twice daily       Allergies:  Contrast dye, Lisinopril, Nitrous oxide, Chlorhexidine, Sulfa drugs, Dapsone, Furosemide, Metrogel [metronidazole], Azithromycin, and Cefuroxime    Social History:   Social History     Tobacco Use     Smoking status: Never     Smokeless tobacco: Never   Vaping Use     Vaping Use: Never used   Substance Use Topics     Alcohol use: No     Alcohol/week: 0.0 standard drinks     Drug use: No     Occupation: pharmacy tech, works several shifts per week  Social status:      Family History:  Family History   Problem Relation Age of Onset     Kidney Disease Mother      Kidney failure Mother      Coronary Artery Disease Father      Cerebrovascular Disease Father      Heart Disease Father      Sleep Apnea Father      Hypertension Sister      Diabetes Sister      Cerebrovascular Disease Sister      Kidney Disease Sister      Breast Cancer No family hx of      Cancer - colorectal No family hx of      Ovarian Cancer No family hx of      Prostate Cancer No  family hx of      Other Cancer No family hx of      Asthma No family hx of      Anesthesia Reaction No family hx of      Deep Vein Thrombosis (DVT) No family hx of      Melanoma No family hx of      Skin Cancer No family hx of       ROS:  As per HPI, otherwise negative.    PHYSICAL EXAM:  Deferred    Labs:    23 and 23  Urine protein/creatinine TLTC  Tacrolimus 4.6  Hgb 12.3  Plt 208  Creatinine 0.81  Calcium 11.2  PTH 59  TSH 1.41  A1C 5.2  Ionized calcium 1.38    22  PTH:  365  BK virus:  Not detected  Phosphorus:  2.2  Ionized calcium:  1.27  Tacrolimus:  5.4  Creatinine:  0.79  Na:  139  K:  4.4  Chloride:  110  CO2:  20  Anion gap:  9  BUN:  19  Calcium:  10.3  GFR >90  Hgb:  12.4  MCV:  91  Plt:  206    22  Total protein/creatinine urine TLTC  Urine culture no growth    21  25 OH Vit D total:  <19     Other Imagin/1/21  renal transplant:  Right lower quadrant renal transplant moderate  hydronephrosis unchanged with voiding.    5/10/21  renal transplant:  Stable moderate hydronephrosis in the transplant kidney, unchanged after voiding.    22 echocardiogram:  Left ventricular size, wall motion and function are normal. The ejection fraction is 60-65%. Normal right ventricle size and systolic function. Normal left atrial size. No hemodynamically significant valvular abnormalities on 2D or color flow imaging.    22 stress echocardiogram:  Patient exercised 6:52 seconds stopping secondary to fatigue.  Patient achieved 79% of maximum predicted heart rate.  Reduced exercise tolerance for age.  Exercise ECG is non diagnositic secondary to less than 85% of maximum heart rate achieved. No changes to suggest ischemia at the heart rate/workload  achieved. Resting LV function is normal. Resting LVEF estimated at 55-60%. Post exercise appropriate augmentation of systolic function without observed regional wall motion abnormality. Technically limited post exercise images.  Conclusions: Non diagnostic stress echocardiogram secondary to less than 85% of maximum heart rate achieved. No ECG or echocardiographic findings to suggest ischemia at the workload achieved.    Ultrasound:  Please see imaging tab for today's US report.    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  Prior to transplant with history of hypertension, anemia of chronic disease, DVT of upper extremity associated with dialysis catheter, 1 seizure associated with SAH in  while on anticoagulation, hyperprolactinemia, pyelonephritis of transplant, bacterial endocarditis.    Given remote history of 1 seizure associated with SAH while on anticoagulation in , this was not discussed as part of pregnancy consultation today.  Hyperprolactinemia was functional likely related to hypothyroidism or ESRD prior to transplant and has largely resolved and thus was not discussed today.    Renal transplant  Orthostatic hypotension, intermittent  Secondary renal hyperparathyroidism  Chronic hypertension prior to transplant, resolved  Anemia of chronic disease prior to transplant, resolved  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  We reviewed Ms. Wagner's history in detail regarding her renal transplant.  She continues on Azathiorpine and Tacrolimus.     We counseled the patient regarding her current pregnancy in the setting of a renal transplant. In regards to her immunosuppressive medications, tacrolimus, azathioprine and prednisone/fludrocortisone may be used safely during pregnancy and breast feeding. Tacrolimus levels will likely fluctuate with pregnancy and require increased monitoring. Use of corticosteroids slightly increases the risk of developing fetal cleft lip and palate.   Systemic corticosteroids may also influence fetal growth (decreased birth weight) however data is limited and conflicting.  She will need consideration for stress dose steroids in labor and at the time of delivery if she requires steroids in this pregnancy.     Patients also are at an increased risk for maternal complications in pregnancy, particularly elevated blood pressure and preeclampsia. Ms. Wagner has a history of chronic hypertension, though has been normotensive with orthostatic hypotension since transplant. She has had baseline HELLP labs and a urine protein/creatinine ratio.  We recommend starting a low dose ASA 81mg daily at 12w to help decrease the risk. Ms. Wagner is already taking ASA 81 mg and it is safe to continue during early pregnancy as well. There is also an increased risk of developing gestational diabetes, and she should undergo early GDM screening as well which we will plan for at next visit. There additionally is an increased risk for urinary tract infections and patients should be regularly screened for infection. Prophylactic antibiotics should be initiated after one infection in pregnancy in this population.    Pregnancy may have significant negative effects on renal disease and can change kidney function both temporarily and permanently.  Increases in proteinuria occur in about 50% of pregnancies, hypertension develops or worsens in about 25% of pregnancies which may lead to maternal injury such as stroke, or poor fetal outcome.  Women with nephrotic syndrome may develop worsening edema as the pregnancy progresses, however this should resolve with delivery.  Permanent decline in renal function occurs in up to 10% of pregnancies affected by mild renal disease (creatinine <1.5 mg/dL); women with hypertension seem to be at increased risk.     Overall, the long-term course of graft function does not appear to be affected by pregnancy. However, patients who did experience graft loss during or  after pregnancy were noted to have a higher pre-pregnancy creatinine (BRIAN Holbrook. Pregnancy in Renal Transplant Recipients and Donors, Seminars in Nephrology 2017). Ms. Wagner has had a normal creatinine, which is reassuring. There is also the risk of graft rejection. If rejection is suspected, biopsy should be performed prior to 24w. If there is concern for rejection after 24w, empiric treatment should be initiated.     In terms of fetal outcomes, a study by Arina et al showed an increased rate of  birth and fetal growth restriction in patients with a renal transplant (Clinical Transplantation 2017). Vaginal delivery is not affected by the anatomic location of the graft.  section should be reserved for routine obstetrical indications.    Regarding hydronephrosis and history of recurrent UTI with stenting, we discussed that there is a risk for worsening hydronephrosis with extrinsic compression in pregnancy that may require ureteral stent versus percutaneous nephrostomy.  If stenting is require she may required prophylactic antibiotics.  Her graft is located in the right iliac fossa.      Recommendations:  Continue low dose aspirin for prevention of preeclampsia    Obstetric care with MFM as primary.    Continue close follow up with nephrology and transplant team. Recommend visit with nephrology at least qtrimester.    Close management of blood pressure with goal of <130/90.    Increased frequency of prenatal visits: every 2 weeks until the third trimester, then weekly thereafter    Early detection and treatment of asymptomatic bacteriuria with urine culture at least every trimester    Assessment of renal function every 4 weeks (currently with nephrology labs are q2 weeks)    Monitor for signs and symptoms of preeclampsia    Monthly ultrasound for fetal growth after initial anatomy scan at 18 weeks     surveillance with twice weekly BPP (or NST and MVP) at 32 weeks gestation    Delivery is  indicated based on secondary sequelae (e.g. hypertension, preeclampsia, fetal growth restriction, fetal distress) and underlying disease; generally 37 to 39 weeks gestation so as to avoid irreversible renal injury     reserved for obstetric indications      FATOUMATA  FATOUMATA has been associated with many pregnancy complications, especially if it is untreated, including gestational hypertension, preeclampsia (both of which may be confounded by obesity) and gestational diabetes, as well as  birth (which may partially be attributed to iatrogenic  birth in the setting of preeclampsia).  Studies that suggest an association with small for gestational age have been confounded by preeclampsia.  Importantly, an association has not been found with fetal demise.  There is some evidence that pregnancy may exacerbate underling breathing disorders.  As in non-pregnant patients CPAP is first line treatment.  Limiting weight gain, especially in obese gravidas, may limit symptom progression.      Recommendations:    Continue CPAP and encourage compliance    Monitor BP closely    Early testing for diabetes at next visit which should be repeated at 24-28 weeks if normal    Anesthesia consultation in the 3rd trimester    Bring CPAP to hospital      Hypothyroidism  Thyroid requirements increase in pregnancy due to increases in metabolism and thyroid binding globulin.  Thyroid function tests, in particular TSH, should be followed closely to ensure adequate treatment.  Hypothyroidism has been associated with higher pregnancy complication rates including miscarriage, preeclampsia, abruption, low birth weight and stillbirth.  Untreated hypothyroidism has also been associated with adverse fetal neurological development, but well treated hypothyroidism (ie euthyroid state) carries an excellent prognosis for mother and baby.      Recommendations:     Maintain euthyroidism via treatment with levothyroxine, aimed at keeping the TSH  at the desired range by trimester; dose requirements may increase by as much as 30-50%.      TSH should be checked q4-6 weeks in the first half of pregnancy and adjusted.    Continued follow-up with her endocrinologist throughout pregnancy.    After delivery levothyroxine can be reduced to pre-pregnancy levels, but serum TSH should be monitored 4-6 weeks later to confirm appropriate dosing      DVT  Ms. Wagner has a remote history of a DVT of the upper extremity that was provoked at site of dialysis catheter.  She has had multiple complications when treated with anticoagulation in 2014 including subarachnoid hemorrhage and associated seizure.  We recommend surveillance without prophylactic anticoagulation in pregnancy in light of single provoked DVT unrelated to estrogen/pregnancy unless a thrombophilia is identified. She has had a partial evaluation for thrombophilia (factor 2 and V negative, lupus anticoagulant negative, cardiolipin negative).    Recommendations:    Assessment for inherited thrombophilias including Factor V Leiden and Prothrombin Gene Mutation:   Negative.    Previously recommended to have assessment for Protein C and S deficiency and Antithrombin III deficiency as well as  antiphospholipid antibody syndrome with anticardiolipin IgG/IgM, beta-2-glycoprotein IgG/IgM and lupus anticoagulant.  Will obtain next visit.    History of bacterial endocarditis with 2/11/22 echocardiogram without evidence of valve vegetation.   Recommendations:    Updated echocardiogram recommend.  Will order today.    RTC in 2 weeks for new OB visit (pap smear, new OB labs needed, early GCT, hypercoagulability labs, US).    Patient seen and staffed with Dr. Vianey Montiel MD   Maternal-Fetal Medicine Fellow, PGY6  2/28/2023 1:16 PM   ---      Physician Attestation  I saw this patient with the resident and agree with the resident/fellow's findings and plan of care as documented in the note.       I personally spent  a total of 60 minutes, including both face-to-face and non-face-to-face on the date of the encounter, addressing the above diagnosis. Activities performed in this time include chart review, obtaining / reviewing history, performing a medically necessary evaluation, documentation and counseling/care coordination/ordering tests/ordering meds.      Jewell Winters MD  Maternal Fetal Medicine   Date of Service (when I saw the patient): 2/28/2023

## 2023-02-28 NOTE — NURSING NOTE
Kristy and partner seen in clinic today for 1st tri complete US and MFM consult at 10w4d gestation (see report/notes). VSS. Pt denies bldg/lof/change in discharge/cramping. Dr. Winters and Dr. Montiel met with pt and discussed POC. Plan to return in 2 weeks for 1st OBV, 1st tri ultrasound, and prenatal labs. Will also help patient get scheduled for a maternal echo. Pt discharged stable and ambulatory.

## 2023-03-02 ENCOUNTER — TELEPHONE (OUTPATIENT)
Dept: ENDOCRINOLOGY | Facility: CLINIC | Age: 33
End: 2023-03-02
Payer: MEDICARE

## 2023-03-02 DIAGNOSIS — E03.9 ACQUIRED HYPOTHYROIDISM: Primary | ICD-10-CM

## 2023-03-02 NOTE — RESULT ENCOUNTER NOTE
Dear Mona    Here are your lab results.  I am okay with the T3 being higher-we see this with pregnancy.  The more important test, the TSH and free T4, are normal.  We will check free T3 is moving forward.  There is nothing you need to do at this time.  I will adjust this on the lab test.  Please recheck in 1 month.    If you have any questions, please feel free to contact my nurse at [phone] select option #3 for triage nurse  or  option #1 for scheduling related questions.    Regards    Katrin Lopez MD

## 2023-03-02 NOTE — TELEPHONE ENCOUNTER
Labs noted below    -  Dear Mona    Here are your lab results.  I am okay with the T3 being higher-we see this with pregnancy.  The more important test, the TSH and free T4, are normal.  We will check free T3 is moving forward.  There is nothing you need to do at this time.  I will adjust this on the lab test.  Please recheck in 1 month.    If you have any questions, please feel free to contact my nurse at [phone] select option #3 for triage nurse  or  option #1 for scheduling related questions.    Regards    Katrin Lopez MD     Lab on 02/28/2023   Component Date Value Ref Range Status     TSH 02/28/2023 0.37  0.30 - 4.20 uIU/mL Final     Free T4 02/28/2023 1.28  0.90 - 1.70 ng/dL Final     T3 Total 02/28/2023 207 (H)  85 - 202 ng/dL Final     Sodium 02/28/2023 133 (L)  136 - 145 mmol/L Final     Potassium 02/28/2023 4.4  3.4 - 5.3 mmol/L Final     Chloride 02/28/2023 102  98 - 107 mmol/L Final     Carbon Dioxide (CO2) 02/28/2023 22  22 - 29 mmol/L Final     Anion Gap 02/28/2023 9  7 - 15 mmol/L Final     Urea Nitrogen 02/28/2023 17.7  6.0 - 20.0 mg/dL Final     Creatinine 02/28/2023 0.80  0.51 - 0.95 mg/dL Final     Calcium 02/28/2023 10.5 (H)  8.6 - 10.0 mg/dL Final     Glucose 02/28/2023 95  70 - 99 mg/dL Final     GFR Estimate 02/28/2023 >90  >60 mL/min/1.73m2 Final    eGFR calculated using 2021 CKD-EPI equation.     WBC Count 02/28/2023 9.8  4.0 - 11.0 10e3/uL Final     RBC Count 02/28/2023 4.02  3.80 - 5.20 10e6/uL Final     Hemoglobin 02/28/2023 12.2  11.7 - 15.7 g/dL Final     Hematocrit 02/28/2023 37.5  35.0 - 47.0 % Final     MCV 02/28/2023 93  78 - 100 fL Final     MCH 02/28/2023 30.3  26.5 - 33.0 pg Final     MCHC 02/28/2023 32.5  31.5 - 36.5 g/dL Final     RDW 02/28/2023 13.5  10.0 - 15.0 % Final     Platelet Count 02/28/2023 206  150 - 450 10e3/uL Final

## 2023-03-14 LAB
ABO/RH(D): NORMAL
ANTIBODY SCREEN: NEGATIVE
SPECIMEN EXPIRATION DATE: NORMAL

## 2023-03-15 ENCOUNTER — TELEPHONE (OUTPATIENT)
Dept: TRANSPLANT | Facility: CLINIC | Age: 33
End: 2023-03-15

## 2023-03-15 ENCOUNTER — OFFICE VISIT (OUTPATIENT)
Dept: MATERNAL FETAL MEDICINE | Facility: CLINIC | Age: 33
End: 2023-03-15
Attending: OBSTETRICS & GYNECOLOGY
Payer: MEDICARE

## 2023-03-15 ENCOUNTER — TELEPHONE (OUTPATIENT)
Dept: ENDOCRINOLOGY | Facility: CLINIC | Age: 33
End: 2023-03-15

## 2023-03-15 ENCOUNTER — ANCILLARY ORDERS (OUTPATIENT)
Dept: MATERNAL FETAL MEDICINE | Facility: CLINIC | Age: 33
End: 2023-03-15

## 2023-03-15 ENCOUNTER — APPOINTMENT (OUTPATIENT)
Dept: LAB | Facility: CLINIC | Age: 33
End: 2023-03-15
Attending: OBSTETRICS & GYNECOLOGY
Payer: MEDICARE

## 2023-03-15 ENCOUNTER — HOSPITAL ENCOUNTER (OUTPATIENT)
Dept: ULTRASOUND IMAGING | Facility: CLINIC | Age: 33
Discharge: HOME | End: 2023-03-15
Attending: OBSTETRICS & GYNECOLOGY
Payer: MEDICARE

## 2023-03-15 VITALS
BODY MASS INDEX: 31.93 KG/M2 | OXYGEN SATURATION: 98 % | WEIGHT: 169 LBS | SYSTOLIC BLOOD PRESSURE: 125 MMHG | DIASTOLIC BLOOD PRESSURE: 79 MMHG | RESPIRATION RATE: 18 BRPM | HEART RATE: 76 BPM

## 2023-03-15 DIAGNOSIS — Z36.9 FIRST TRIMESTER SCREENING: Primary | ICD-10-CM

## 2023-03-15 DIAGNOSIS — Z34.91 PRENATAL CARE IN FIRST TRIMESTER: ICD-10-CM

## 2023-03-15 DIAGNOSIS — Z94.0 KIDNEY REPLACED BY TRANSPLANT: ICD-10-CM

## 2023-03-15 DIAGNOSIS — O09.91 HIGH-RISK PREGNANCY IN FIRST TRIMESTER: ICD-10-CM

## 2023-03-15 DIAGNOSIS — Z48.298 AFTERCARE FOLLOWING ORGAN TRANSPLANT: ICD-10-CM

## 2023-03-15 DIAGNOSIS — Z87.440 HISTORY OF RECURRENT UTIS: ICD-10-CM

## 2023-03-15 DIAGNOSIS — I82.90 DEEP VEIN THROMBOSIS (DVT) OF NON-EXTREMITY VEIN, UNSPECIFIED CHRONICITY: ICD-10-CM

## 2023-03-15 DIAGNOSIS — Z13.1 ENCOUNTER FOR SCREENING FOR DIABETES MELLITUS: ICD-10-CM

## 2023-03-15 DIAGNOSIS — E07.9 DISORDER OF THYROID, UNSPECIFIED: ICD-10-CM

## 2023-03-15 DIAGNOSIS — O23.41 URINARY TRACT INFECTION IN MOTHER DURING FIRST TRIMESTER OF PREGNANCY: ICD-10-CM

## 2023-03-15 DIAGNOSIS — O09.91 HIGH-RISK PREGNANCY IN FIRST TRIMESTER: Primary | ICD-10-CM

## 2023-03-15 DIAGNOSIS — N25.81 SECONDARY RENAL HYPERPARATHYROIDISM (H): Primary | ICD-10-CM

## 2023-03-15 DIAGNOSIS — N18.6 END STAGE RENAL DISEASE (H): ICD-10-CM

## 2023-03-15 LAB
ALBUMIN MFR UR ELPH: 4 MG/DL (ref 1–14)
ALBUMIN UR-MCNC: NEGATIVE MG/DL
ANION GAP SERPL CALCULATED.3IONS-SCNC: 9 MMOL/L (ref 7–15)
APPEARANCE UR: CLEAR
AT III ACT/NOR PPP CHRO: 91 % (ref 85–135)
BACTERIA #/AREA URNS HPF: ABNORMAL /HPF
BILIRUB UR QL STRIP: NEGATIVE
BUN SERPL-MCNC: 15.4 MG/DL (ref 6–20)
C TRACH DNA SPEC QL NAA+PROBE: NEGATIVE
C TRACH DNA SPEC QL PROBE+SIG AMP: NEGATIVE
CALCIUM SERPL-MCNC: 11.3 MG/DL (ref 8.6–10)
CHLORIDE SERPL-SCNC: 105 MMOL/L (ref 98–107)
COLOR UR AUTO: ABNORMAL
CREAT SERPL-MCNC: 0.83 MG/DL (ref 0.51–0.95)
CREAT UR-MCNC: 16 MG/DL
DEPRECATED HCO3 PLAS-SCNC: 23 MMOL/L (ref 22–29)
ERYTHROCYTE [DISTWIDTH] IN BLOOD BY AUTOMATED COUNT: 13.4 % (ref 10–15)
GFR SERPL CREATININE-BSD FRML MDRD: >90 ML/MIN/1.73M2
GLUCOSE 1H P 50 G GLC PO SERPL-MCNC: 126 MG/DL (ref 70–129)
GLUCOSE SERPL-MCNC: 126 MG/DL (ref 70–99)
GLUCOSE UR STRIP-MCNC: NEGATIVE MG/DL
HBV SURFACE AG SERPL QL IA: NONREACTIVE
HCT VFR BLD AUTO: 36.9 % (ref 35–47)
HCV AB SERPL QL IA: NONREACTIVE
HGB BLD-MCNC: 11.7 G/DL (ref 11.7–15.7)
HGB UR QL STRIP: NEGATIVE
HIV 1+2 AB+HIV1 P24 AG SERPL QL IA: NONREACTIVE
HOLD SPECIMEN: NORMAL
KETONES UR STRIP-MCNC: NEGATIVE MG/DL
LEUKOCYTE ESTERASE UR QL STRIP: NEGATIVE
MCH RBC QN AUTO: 29.8 PG (ref 26.5–33)
MCHC RBC AUTO-ENTMCNC: 31.7 G/DL (ref 31.5–36.5)
MCV RBC AUTO: 94 FL (ref 78–100)
N GONORRHOEA DNA SPEC QL NAA+PROBE: NEGATIVE
N GONORRHOEA DNA SPEC QL NAA+PROBE: NEGATIVE
NITRATE UR QL: NEGATIVE
PH UR STRIP: 6.5 [PH] (ref 5–7)
PLATELET # BLD AUTO: 205 10E3/UL (ref 150–450)
POTASSIUM SERPL-SCNC: 4 MMOL/L (ref 3.4–5.3)
PROT/CREAT 24H UR: 0.25 MG/MG CR (ref 0–0.2)
RBC # BLD AUTO: 3.93 10E6/UL (ref 3.8–5.2)
RBC URINE: 1 /HPF
RUBV IGG SERPL QL IA: 0.44 INDEX
RUBV IGG SERPL QL IA: NORMAL
SODIUM SERPL-SCNC: 137 MMOL/L (ref 136–145)
SP GR UR STRIP: 1 (ref 1–1.03)
SQUAMOUS EPITHELIAL: 1 /HPF
T PALLIDUM AB SER QL: NONREACTIVE
TACROLIMUS BLD-MCNC: 4 UG/L (ref 5–15)
TME LAST DOSE: ABNORMAL H
TME LAST DOSE: ABNORMAL H
UROBILINOGEN UR STRIP-MCNC: NORMAL MG/DL
VZV IGG SER QL IA: 59.8 INDEX
VZV IGG SER QL IA: NORMAL
WBC # BLD AUTO: 8.9 10E3/UL (ref 4–11)
WBC URINE: 0 /HPF

## 2023-03-15 PROCEDURE — 82950 GLUCOSE TEST: CPT

## 2023-03-15 PROCEDURE — 99214 OFFICE O/P EST MOD 30 MIN: CPT | Mod: 25 | Performed by: ADVANCED PRACTICE MIDWIFE

## 2023-03-15 PROCEDURE — 87389 HIV-1 AG W/HIV-1&-2 AB AG IA: CPT | Performed by: ADVANCED PRACTICE MIDWIFE

## 2023-03-15 PROCEDURE — 87491 CHLMYD TRACH DNA AMP PROBE: CPT | Performed by: ADVANCED PRACTICE MIDWIFE

## 2023-03-15 PROCEDURE — 85300 ANTITHROMBIN III ACTIVITY: CPT

## 2023-03-15 PROCEDURE — 86146 BETA-2 GLYCOPROTEIN ANTIBODY: CPT | Performed by: ADVANCED PRACTICE MIDWIFE

## 2023-03-15 PROCEDURE — 80197 ASSAY OF TACROLIMUS: CPT | Performed by: ADVANCED PRACTICE MIDWIFE

## 2023-03-15 PROCEDURE — 84156 ASSAY OF PROTEIN URINE: CPT

## 2023-03-15 PROCEDURE — 76813 OB US NUCHAL MEAS 1 GEST: CPT

## 2023-03-15 PROCEDURE — 81001 URINALYSIS AUTO W/SCOPE: CPT

## 2023-03-15 PROCEDURE — 86850 RBC ANTIBODY SCREEN: CPT

## 2023-03-15 PROCEDURE — 86706 HEP B SURFACE ANTIBODY: CPT | Performed by: ADVANCED PRACTICE MIDWIFE

## 2023-03-15 PROCEDURE — 86780 TREPONEMA PALLIDUM: CPT | Performed by: ADVANCED PRACTICE MIDWIFE

## 2023-03-15 PROCEDURE — 87591 N.GONORRHOEAE DNA AMP PROB: CPT | Mod: XS | Performed by: ADVANCED PRACTICE MIDWIFE

## 2023-03-15 PROCEDURE — 86147 CARDIOLIPIN ANTIBODY EA IG: CPT | Performed by: ADVANCED PRACTICE MIDWIFE

## 2023-03-15 PROCEDURE — 87624 HPV HI-RISK TYP POOLED RSLT: CPT | Performed by: ADVANCED PRACTICE MIDWIFE

## 2023-03-15 PROCEDURE — 87086 URINE CULTURE/COLONY COUNT: CPT | Performed by: ADVANCED PRACTICE MIDWIFE

## 2023-03-15 PROCEDURE — G0145 SCR C/V CYTO,THINLAYER,RESCR: HCPCS | Performed by: ADVANCED PRACTICE MIDWIFE

## 2023-03-15 PROCEDURE — G0463 HOSPITAL OUTPT CLINIC VISIT: HCPCS | Mod: 25 | Performed by: ADVANCED PRACTICE MIDWIFE

## 2023-03-15 PROCEDURE — 36415 COLL VENOUS BLD VENIPUNCTURE: CPT

## 2023-03-15 PROCEDURE — 86803 HEPATITIS C AB TEST: CPT | Performed by: ADVANCED PRACTICE MIDWIFE

## 2023-03-15 PROCEDURE — 87491 CHLMYD TRACH DNA AMP PROBE: CPT

## 2023-03-15 PROCEDURE — 86787 VARICELLA-ZOSTER ANTIBODY: CPT | Performed by: ADVANCED PRACTICE MIDWIFE

## 2023-03-15 PROCEDURE — 87340 HEPATITIS B SURFACE AG IA: CPT | Performed by: ADVANCED PRACTICE MIDWIFE

## 2023-03-15 PROCEDURE — 80048 BASIC METABOLIC PNL TOTAL CA: CPT

## 2023-03-15 PROCEDURE — 85027 COMPLETE CBC AUTOMATED: CPT

## 2023-03-15 PROCEDURE — 87591 N.GONORRHOEAE DNA AMP PROB: CPT | Performed by: ADVANCED PRACTICE MIDWIFE

## 2023-03-15 PROCEDURE — 86762 RUBELLA ANTIBODY: CPT | Performed by: ADVANCED PRACTICE MIDWIFE

## 2023-03-15 PROCEDURE — 76813 OB US NUCHAL MEAS 1 GEST: CPT | Mod: 26 | Performed by: OBSTETRICS & GYNECOLOGY

## 2023-03-15 PROCEDURE — 86901 BLOOD TYPING SEROLOGIC RH(D): CPT

## 2023-03-15 ASSESSMENT — PAIN SCALES - GENERAL: PAINLEVEL: NO PAIN (0)

## 2023-03-15 ASSESSMENT — PATIENT HEALTH QUESTIONNAIRE - PHQ9: SUM OF ALL RESPONSES TO PHQ QUESTIONS 1-9: 6

## 2023-03-15 NOTE — TELEPHONE ENCOUNTER
Called pt - pt in 2nd trimester - will have do neck US and see ENT - intersted in possible options for surgery for teritary hyperparathyroidism

## 2023-03-15 NOTE — PROGRESS NOTES
Maternal-Fetal Medicine   First OB Visit    Mona Wagner  : 1990  MRN: [id number]      HPI:  Mona Wagner is a 32 year old  at 12w5d by LMP consistent with 8w4d US here for new OB visit     Today Mona Wagner is here with her partner, Jt, and is feeling well. She has not had nausea or vomiting or early pregnancy, mainly just fatigue. She has also noticed an acne breakout on her chest. Since becoming pregnant, she has stopped using any skin care products as she is unsure of what is safe in pregnancy.     For full medical hx, please see initial MFM consult note by Zohaib Montiel and Vianey on 23. In regards to her kidney transplant, she is feeling well. She checks her BP at home and is well controlled under 120s/70s. She reports that her calcium has been high and is being monitored. She and her nephrologist have discussed possibly starting Sensipar for this, but there is limited data of this medication in pregnancy. She states there has been discussion about possible surgery to address this issue, but states that nephrology would like MFM's opinion about surgery.      Dating:    LMP: 22  Dating ultrasound: 23 at 8w4d  Assisted reproduction: none  Assigned EDC: 23 by LMP      OB HISTORY  OB History    Para Term  AB Living   1 0 0 0 0 0   SAB IAB Ectopic Multiple Live Births   0 0 0 0 0      # Outcome Date GA Lbr Robinson/2nd Weight Sex Delivery Anes PTL Lv   1 Current                History of GDM: No,  PTL : No,  History of HTN in pregnancy: No,  Thrombocytopenia: No,  Shoulder dystocia: No,  Vacuum Extraction: No  PPH: No   3rd of 4th degree laceration: No.   Other complications: No    Gynecologic History:  - Last Pap on 3/13/20. NIL   - Denies any history of abnormal pap smears  - Denies prior cervical surgery or procedures  - Denies any history of frequent UTIs, vaginal infections, or STIs    Past Medical History:  Past Medical History:   Diagnosis Date     Anemia in chronic kidney  disease      History of bacterial endocarditis      History of blood transfusion      History of DVT (deep vein thrombosis) 2014     History of seizure 2014     History of subarachnoid hemorrhage      Hydronephrosis      Hypothyroidism      Kidney transplanted 08/03/2019    DCD DDKT. Induction with thymo 6mg/kg.     Orthostatic hypotension      FATOUMATA (obstructive sleep apnea)      Pyelonephritis of transplanted kidney 01/23/2020     Secondary renal hyperparathyroidism (H)      Urinary tract infection      Past Surgical History:  Past Surgical History:   Procedure Laterality Date     BENCH KIDNEY N/A 8/3/2019    Procedure: BACKBENCH PREPARATION, ALLOGRAFT, KIDNEY;  Surgeon: Dorian Johnson MD;  Location: [facility]     COMBINED CYSTOSCOPY, RETROGRADES, EXCHANGE STENT URETER(S) Right 11/23/2020    Procedure: CYSTOSCOPY, CYSOTGRAM, WITH RETROGRADE PYELOGRAM, ureteroscopy,  AND URETERAL STENT;  Surgeon: Wally Britt MD;  Location: [facility] OR     COMBINED CYSTOSCOPY, RETROGRADES, URETEROSCOPY, LASER HOLMIUM LITHOTRIPSY URETER(S), INSERT STENT N/A 12/6/2019    Procedure: CYSTOURETEROSCOPY, WITH RETROGRADE PYELOGRAM of transplant kidney, STENT INSERTION, Laser on standby;  Surgeon: Wally Britt MD;  Location: [facility] OR     CREATE FISTULA ARTERIOVENOUS UPPER EXTREMITY  1/2/2014    Procedure: CREATE FISTULA ARTERIOVENOUS UPPER EXTREMITY;  Left Wrist Arteriovenous Fistula Placement;  Surgeon: Shashi Castro MD;  Location: [facility] OR     CREATE FISTULA ARTERIOVENOUS UPPER EXTREMITY       CYSTOSCOPY, RETROGRADES, INSERT STENT URETER(S), COMBINED N/A 8/20/2020    Procedure: CYSTOSCOPY, WITH RETROGRADE PYELOGRAM, CYSTOGRAM AND URETERAL STENT INSERTION - TRANSPLANT KIDNEY;  Surgeon: Wally Britt MD;  Location[facility] OR     IR CEREBRAL ANGIOGRAM  9/17/2014     PERCUTANEOUS BIOPSY KIDNEY Right 9/17/2019    Procedure: Right Kidney Biopsy;  Surgeon: Deny Rios MD;  Location: [facility] OR     RASTA/DIALYSIS CATHETER   12/10/2013          TRANSPLANT KIDNEY RECIPIENT  DONOR N/A 8/3/2019    Procedure: TRANSPLANT, KIDNEY, RECIPIENT,  DONOR with Ureteral Stent Placement;  Surgeon: Dorian Johnson MD;  Location:  OR         INFECTION HISTORY  HIV: No  Hepatitis B: No  Hepatitis C: No  Tuberculosis: No    Genital Herpes self: no  Herpes partner:  no  Chlamydia:  no  Gonorrhea:  no  HPV: No  Syphilis:  No  Chicken Pox:  Yes - vaccinated    Current Medications:  Prior to Admission medications    Medication Sig Last Dose Taking? Auth Provider Long Term End Date   acetaminophen (TYLENOL) 325 MG tablet Take 2 tablets (650 mg) by mouth every 4 hours as needed for mild pain   Macarena Bowen MD No    aspirin (ASA) 81 MG chewable tablet Take 1 tablet (81 mg) by mouth daily   Macarena Bowen MD     azaTHIOprine (IMURAN) 50 MG tablet Take 3 tablets (150 mg) by mouth daily   Nakul Hill MD Yes    cinacalcet (SENSIPAR) 30 MG tablet Take 1 tablet (30 mg) by mouth every other day  Patient not taking: Reported on 2023   Gelacio Mcintyre MD Yes    diphenhydrAMINE (BENADRYL) 25 MG capsule Take 1 capsule (25 mg) by mouth daily as needed for itching or allergies Take 1-2 tablets by mouth every 6 hours PRN itching/allergies  Patient not taking: Reported on 2023   Macarena Bowen MD No    EPINEPHrine (ANY BX GENERIC EQUIV) 0.3 MG/0.3ML injection 2-pack    Reported, Patient     famotidine (PEPCID) 20 MG tablet Take 1 tablet (20 mg) by mouth 2 times daily   Macarena Bowen MD     fluticasone (FLONASE) 50 MCG/ACT nasal spray Spray 1 spray into both nostrils daily   Macarena Bowen MD     hydrOXYzine (ATARAX) 10 MG tablet Take 1 tablet (10 mg) by mouth 3 times daily as needed for anxiety  Patient not taking: Reported on 2023   Ondina Deleon APRN CNP     levothyroxine (SYNTHROID/LEVOTHROID) 25 MCG tablet TAKE 1.5 tablet daily 6 days per week and 1 tablet daily on  the 7th day ( will be titrating up)   Katrin Lopez MD Yes    polyethylene glycol (MIRALAX) 17 GM/Dose powder Take 17 g (1 capful) by mouth daily as needed for constipation   Brian Kohli MD     Prenatal Vit-Fe Fumarate-FA (PRENATAL MULTIVITAMIN W/IRON) 27-0.8 MG tablet Take 1 tablet by mouth daily   Macarena Bowen MD     Prenatal Vit-Fe Fumarate-FA (PRENATAL VITAMINS) 28-0.8 MG TABS Take 1 tablet by mouth daily   Rosanna Montiel MD     simethicone (MYLICON) 125 MG chewable tablet Take 1 tablet (125 mg) by mouth 4 times daily as needed for intestinal gas   Macarena Bowen MD Yes    tacrolimus (GENERIC EQUIVALENT) 0.5 MG capsule Tacrolimus 0.5mg - take 1 capsule by mouth twice daily, with 1mg capsules for a total dose of 3.5 twice daily.   Gelacio Mcintyre MD     tacrolimus (GENERIC EQUIVALENT) 1 MG capsule Tacrolimus 1 mg - take 3 capsules by mouth twice a day, with 0.5mg capsules for total dose of 3.5mg twice daily   Gelacio Mcintyre MD           Allergies:  Contrast dye, Lisinopril, Nitrous oxide, Chlorhexidine, Sulfa drugs, Dapsone, Furosemide, Metrogel [metronidazole], Azithromycin, and Cefuroxime    Social History:   Occupation: works part-time as a pharmacy tech  Status: partnered, but not . Unable to screen for IPV as partner was present for entire visit  Denies use of alcohol, drugs or smoking.    Family History:  Denies history of genetic disorders, preeeclampsia, thromboembolic disease, bleeding disorders, cognitive dysfunction    Partner History:  Non-contributory    ROS:  10-point ROS negative except as in HPI       PHYSICAL EXAM:  LMP 12/16/2022   Gen: NAD, well appearing  CV: RRR  Respiratory: breathing unlabored, no SOB,  lungs clear to auscultation  Breasts: some tenderness, no masses  Abdomen: gravid and cwd, non-tender, non-distended  Pelvic: external genitalia WNL. Vagina normal rugae, physiologic diischage pesent in vault. Anterior aspect of cervix seen, but not  "fully able to visualize due to patient discomfort with speculum. Pap collected, but may not be adequate specimen.  Extremities: WNL      Ultrasounds:   Please see \"imaging\" tab under chart review for today's ultrasound results.        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 Reviewed that [facility] is a multidisciplinary institution and her care will be collaborative in fashion with [facility] doctors, CNM, fellows, residents, and [facility] clinic for intrapartum management.       Renal transplant:  Secondary Hyperparathyroidism with hypercalcemia   - Follows with Dr. Mcintyre in transplant nephrology. Planning follow up in about a month.  - Resolution of cHTN and anemia of chronic disease s/p transplant.  - Continue with Tacrolimus (goal 4-6) and Azathioprine as prescribed by nephrology. Frequency of lab evaluation generally guided by their discretion.  - Baseline Cr 0.8-1.1; mild proteinuria UPC ratio 0.25.   - Close management of blood pressure with goal of <130/90  - Early detection and treatment of asymptomatic bacteriuria with urine culture at least every trimester. Culture currently in process.  - We discussed that the decision to initiate Sensipar vs surgical management of her parathyroid should be made with her nephrology team. Should she opt for surgical management, this would be acceptable to do so in pregnancy, with ideal timing during the second trimester.     Hypothyroidism:  - Follows with endo. Continue current levothyroxine prescription.  - TSH should be checked q4-6 weeks in the first half of pregnancy and adjusted.  - TSH of 0.37 and normal T4 on " 23.    Hx DVT:  - Assessment for inherited thrombophilias including Factor V Leiden and Prothrombin Gene Mutation:   Negative.  - Additional hypercoagulability work up pending.    Hx of bacterial endocarditis:  - Patient scheduled for updated echo on 3/20.    Routine PNC:  - Prenatal labs:   Rh: +  antibody: neg   HepB/HIV/RPR/HepC: nonreactive   GC/CT: neg   Rubella: non-immune  Varicella: non-immune   GCT: passed early GCT. Will repeat 24-28 weeks   UC: in process   Pap: in process  - Immunizations: s/p Flu and Covid. Recommend Tdap at 28 weeks  - Genetic screening: NIPT pending. Low-risk NT    # Surveillance:  - Level II comprehensive anatomy US at 18 weeks  - Serial growth US q4 after anatomy scan  - Weekly BPPs at 32 weeks    #Delivery Planning:  - Timing of delivery will be indicated by secondary sequelae, but generally 37-39 weeks.  reserved for usual obstetrical indications.   - Labor analgesia: will discuss at later date  - Feeding: needs to be discussed  - Contraception: needs to be discussed      Sonam Toledo CNM on 3/15/2023 at 8:17 AM    35 minutes spent on the date of the encounter, doing chart review, history and exam, documentation and further activities as noted.

## 2023-03-15 NOTE — NURSING NOTE
Mona and partner Jt seen in clinic today for 1st trimester US and 1st OB visit at 12w5d gestation for pregnancy complicated by hx of kidney transplant, FATOUMATA, hypothyroidism, hx recurrent UTI, hx bacterial endocarditis, hx DVT, hx seizure, hx prediabetes, and bicornuate uterus. (see report/notes). VSS. Pt reports not yet feeling fetal movement and denies bldg/lof/change in discharge/contractions/headache/vision changes/chest pain/SOB/edema. Only concerns today were some mild cramping felt briefly yesterday and red bumps across her chest that started when she got pregnant. Patient agreeable to have PAP done today. PHQ-9 completed, see flowsheets. Sonam Toledo met with pt and discussed POC. Plan for return in 2/4/6 weeks for OBVs and 6 weeks for L2. Future visits scheduled at . Additionally, will have NOB/other labs drawn along with early 1 hr GCT and NIPT. Pt accompanied to lab following appointment.

## 2023-03-15 NOTE — PROGRESS NOTES
"Please see \"Imaging\" tab under \"Chart Review\" for details of today's US at the [facility].    Timothy Tellez MD  Maternal-Fetal Medicine      "

## 2023-03-15 NOTE — TELEPHONE ENCOUNTER
Contacted Mona to discuss starting Sensipar per Dr Mcintyre's request, due to concerns for kidney stones, calcification of kidneys (nephrocalcinosis), decrease in kidney function, calcification of blood vessels in her body and in her heart. Mona was originally prescribed Sensipar on 2/23 but choose not to start due to the unknown effect on fetuses. She was seen by maternal-fetal medicine today and was not sure if they were aware. She is resistant to starting Sensipar until she knows everyone is on the same page.She was referral to ENT for possible surgical options for teritary hyperparathyroidism.  Message sent to her providers.

## 2023-03-15 NOTE — TELEPHONE ENCOUNTER
FUTURE VISIT INFORMATION      FUTURE VISIT INFORMATION:    Date: 4/24/23    Time: 3:20pm    Location: [facility]  REFERRAL INFORMATION:    Referring provider:  Katrin Lopez MD    Referring providers clinic:  [facility] ENDO DIABETES    Reason for visit/diagnosis  Per pt/ Secondary renal hyperparathyroidism/ med rec in [facility]/ Ref Katrin Lopez MD in [facility] END[facility] DI[facility]    RECORDS REQUESTED FROM:       Clinic name Comments Records Status Imaging Status   [facility] ENDO DIAB[facility]  3/15/23, 1/20/23- telephone encounter with Katrin Lopez MD [facility] maternal fetal medicine 3/15/23- note with Sonam Toledo CNM [facility] nephrology 2/13/23- note with Gelacio Mcintyre MD Epic

## 2023-03-16 ENCOUNTER — TELEPHONE (OUTPATIENT)
Dept: ENDOCRINOLOGY | Facility: CLINIC | Age: 33
End: 2023-03-16

## 2023-03-16 LAB
B2 GLYCOPROT1 IGG SERPL IA-ACNC: 1.3 U/ML
B2 GLYCOPROT1 IGM SERPL IA-ACNC: <2.4 U/ML
BACTERIA UR CULT: NO GROWTH
CARDIOLIPIN IGG SER IA-ACNC: <2 GPL-U/ML
CARDIOLIPIN IGG SER IA-ACNC: NEGATIVE
CARDIOLIPIN IGM SER IA-ACNC: 13 MPL-U/ML
CARDIOLIPIN IGM SER IA-ACNC: ABNORMAL
HBV SURFACE AB SERPL IA-ACNC: 30.05 M[IU]/ML
HBV SURFACE AB SERPL IA-ACNC: REACTIVE M[IU]/ML

## 2023-03-16 NOTE — TELEPHONE ENCOUNTER
----- Message from Katrin Lopez MD sent at 3/16/2023  9:58 AM CDT -----  Can we get her neck US done sooner than later? Pt is pending urgent surgery. There are 2 - parathyroid US and thyroid US

## 2023-03-16 NOTE — TELEPHONE ENCOUNTER
Spoke to pt over the phone and scheduled both ultrasounds ordered by Dr. Lopez with assistance from imaging scheduling. Also scheduled labs for after the last ultrasound to minimize travel.   Wally Angela on 3/16/2023 at 12:14 PM

## 2023-03-17 LAB
BKR LAB AP GYN ADEQUACY: NORMAL
BKR LAB AP GYN INTERPRETATION: NORMAL
BKR LAB AP HPV REFLEX: NORMAL
BKR LAB AP PREVIOUS ABNORMAL: NORMAL
PATH REPORT.COMMENTS IMP SPEC: NORMAL
PATH REPORT.COMMENTS IMP SPEC: NORMAL
PATH REPORT.RELEVANT HX SPEC: NORMAL

## 2023-03-20 ENCOUNTER — ANCILLARY PROCEDURE (OUTPATIENT)
Dept: CARDIOLOGY | Facility: CLINIC | Age: 33
End: 2023-03-20
Attending: STUDENT IN AN ORGANIZED HEALTH CARE EDUCATION/TRAINING PROGRAM
Payer: MEDICARE

## 2023-03-20 ENCOUNTER — TELEPHONE (OUTPATIENT)
Dept: MATERNAL FETAL MEDICINE | Facility: CLINIC | Age: 33
End: 2023-03-20

## 2023-03-20 DIAGNOSIS — Z86.79 HISTORY OF ENDOCARDITIS: ICD-10-CM

## 2023-03-20 DIAGNOSIS — Z48.22 ENCOUNTER FOR AFTERCARE FOLLOWING KIDNEY TRANSPLANT: ICD-10-CM

## 2023-03-20 DIAGNOSIS — Z86.79 HISTORY OF BACTERIAL ENDOCARDITIS: ICD-10-CM

## 2023-03-20 DIAGNOSIS — I39 ENDOCARDITIS AND HEART VALVE DISORDERS IN DISEASES CLASSIFIED ELSEWHERE: ICD-10-CM

## 2023-03-20 DIAGNOSIS — G47.33 OSA (OBSTRUCTIVE SLEEP APNEA): ICD-10-CM

## 2023-03-20 DIAGNOSIS — O09.91 HIGH-RISK PREGNANCY IN FIRST TRIMESTER: ICD-10-CM

## 2023-03-20 LAB
LVEF ECHO: NORMAL
SCANNED LAB RESULT: NORMAL

## 2023-03-20 PROCEDURE — 93306 TTE W/DOPPLER COMPLETE: CPT | Performed by: INTERNAL MEDICINE

## 2023-03-20 NOTE — TELEPHONE ENCOUNTER
Called and discussed normal NIPT results with Mona. Results indicate a low risk for fetal aneuploidy involving chromosomes 21, 18, or 13.  This puts her current pregnancy at low risk for Down syndrome, trisomy 18, and  trisomy 13. Although these results are reassuring, this does not replace a standard chromosome analysis from a chorionic villus sampling or amniocentesis.     Plan: The patient is scheduled to return to [facility] on 3/29/2023. MSAFP is the appropriate second trimester screening test for open neural tube defects; the maternal quad screen is not recommended. Her results are available in her Epic chart for her primary OB to review.    Rosanna Victoria MS, [facility]  Maternal Fetal Medicine  [phone]

## 2023-03-21 DIAGNOSIS — G47.33 OBSTRUCTIVE SLEEP APNEA (ADULT) (PEDIATRIC): Primary | ICD-10-CM

## 2023-03-21 LAB
HUMAN PAPILLOMA VIRUS 16 DNA: NEGATIVE
HUMAN PAPILLOMA VIRUS 18 DNA: NEGATIVE
HUMAN PAPILLOMA VIRUS FINAL DIAGNOSIS: NORMAL
HUMAN PAPILLOMA VIRUS OTHER HR: NEGATIVE

## 2023-03-22 ENCOUNTER — LAB (OUTPATIENT)
Dept: LAB | Facility: HOSPITAL | Age: 33
End: 2023-03-22
Attending: INTERNAL MEDICINE
Payer: MEDICARE

## 2023-03-22 ENCOUNTER — TELEPHONE (OUTPATIENT)
Dept: ENDOCRINOLOGY | Facility: CLINIC | Age: 33
End: 2023-03-22

## 2023-03-22 ENCOUNTER — ANCILLARY ORDERS (OUTPATIENT)
Dept: ENDOCRINOLOGY | Facility: CLINIC | Age: 33
End: 2023-03-22

## 2023-03-22 ENCOUNTER — HOSPITAL ENCOUNTER (OUTPATIENT)
Dept: ULTRASOUND IMAGING | Facility: HOSPITAL | Age: 33
Discharge: HOME | End: 2023-03-22
Attending: INTERNAL MEDICINE
Payer: MEDICARE

## 2023-03-22 DIAGNOSIS — E03.9 ACQUIRED HYPOTHYROIDISM: ICD-10-CM

## 2023-03-22 DIAGNOSIS — N25.81 SECONDARY RENAL HYPERPARATHYROIDISM (H): ICD-10-CM

## 2023-03-22 DIAGNOSIS — N25.81 SECONDARY RENAL HYPERPARATHYROIDISM: ICD-10-CM

## 2023-03-22 LAB
T3FREE SERPL-MCNC: 3.3 PG/ML (ref 2–4.4)
T4 FREE SERPL-MCNC: 1.14 NG/DL (ref 0.9–1.7)
TSH SERPL DL<=0.005 MIU/L-ACNC: 0.77 UIU/ML (ref 0.3–4.2)

## 2023-03-22 PROCEDURE — 84439 ASSAY OF FREE THYROXINE: CPT

## 2023-03-22 PROCEDURE — 84443 ASSAY THYROID STIM HORMONE: CPT

## 2023-03-22 PROCEDURE — 85306 CLOT INHIBIT PROT S FREE: CPT | Performed by: STUDENT IN AN ORGANIZED HEALTH CARE EDUCATION/TRAINING PROGRAM

## 2023-03-22 PROCEDURE — 85303 CLOT INHIBIT PROT C ACTIVITY: CPT | Performed by: STUDENT IN AN ORGANIZED HEALTH CARE EDUCATION/TRAINING PROGRAM

## 2023-03-22 PROCEDURE — 84481 FREE ASSAY (FT-3): CPT

## 2023-03-22 PROCEDURE — 85390 FIBRINOLYSINS SCREEN I&R: CPT | Mod: 26 | Performed by: PATHOLOGY

## 2023-03-22 PROCEDURE — 85730 THROMBOPLASTIN TIME PARTIAL: CPT | Performed by: STUDENT IN AN ORGANIZED HEALTH CARE EDUCATION/TRAINING PROGRAM

## 2023-03-22 PROCEDURE — 76536 US EXAM OF HEAD AND NECK: CPT

## 2023-03-22 NOTE — LETTER
March 22, 2023      Mona Wagner  [address]   [place] [address]        Dear Mona    Here is your neck ultrasound.  There are no thyroid nodules.  However there is a deep nodule on the left mid thyroid that is suggestive of a parathyroid nodule.  I will let your surgeon know as this will help inform her plan for surgery.    Dr. Lopez    Resulted Orders   US Parathyroid    Narrative    EXAM: US PARATHYROID  LOCATION: [facility]  DATE/TIME: 3/22/2023 11:48 AM    INDICATION:  Secondary renal hyperparathyroidism (H)  COMPARISON: None.  TECHNIQUE: Thyroid/parathyroid ultrasound.     FINDINGS:  RIGHT lobe: 4.6 x 1.5 x 1.6  cm. Homogeneous echotexture.  Isthmus: 3  mm.  LEFT lobe: 4.1 x 1.6 x 1.2  cm. Homogeneous echotexture.    NECK: No cervical lymphadenopathy.    NODULES: No thyroid nodules.    There is a 9 mm x 6 mm x 4 mm hypoechoic structure deep to the mid left thyroid lobe        Impression    IMPRESSION:  1.  Indeterminate 9 mm hypoechoic structure deep to the mid left thyroid lobe. A parathyroid gland and/or lesion is possible.        If you have any questions or concerns, please call the clinic at the number listed above.       Sincerely,      Katrin Lopez MD

## 2023-03-23 LAB
DRVVT SCREEN RATIO: 0.8
INR PPP: 1.02 (ref 0.85–1.15)
LA PPP-IMP: NEGATIVE
LUPUS INTERPRETATION: ABNORMAL
PLATELET NEUTRALIZATION: -3 SECONDS
PTT 1:2 MIX: 41 SECONDS (ref 31–45)
PTT RATIO: 1.24
THROMBIN TIME: 15.1 SECONDS (ref 13–19)

## 2023-03-23 NOTE — TELEPHONE ENCOUNTER
Narrative & Impression   EXAM: US PARATHYROID  LOCATION: [facility]  DATE/TIME: 3/22/2023 11:48 AM     INDICATION:  Secondary renal hyperparathyroidism (H)  COMPARISON: None.  TECHNIQUE: Thyroid/parathyroid ultrasound.      FINDINGS:  RIGHT lobe: 4.6 x 1.5 x 1.6  cm. Homogeneous echotexture.  Isthmus: 3  mm.  LEFT lobe: 4.1 x 1.6 x 1.2  cm. Homogeneous echotexture.     NECK: No cervical lymphadenopathy.     NODULES: No thyroid nodules.     There is a 9 mm x 6 mm x 4 mm hypoechoic structure deep to the mid left thyroid lobe                                                                         IMPRESSION:  1.  Indeterminate 9 mm hypoechoic structure deep to the mid left thyroid lobe. A parathyroid gland and/or lesion is possible.          -  Dear Mona    Here is your neck ultrasound.  There are no thyroid nodules.  However there is a deep nodule on the left mid thyroid that is suggestive of a parathyroid nodule.  I will let your surgeon know as this will help inform her plan for surgery.    Dr. Lopez    Lab on 03/22/2023   Component Date Value Ref Range Status     TSH 03/22/2023 0.77  0.30 - 4.20 uIU/mL Final     T3 Free 03/22/2023 3.3  2.0 - 4.4 pg/mL Final     Free T4 03/22/2023 1.14  0.90 - 1.70 ng/dL Final   Ancillary Procedure on 03/20/2023   Component Date Value Ref Range Status     LVEF  03/20/2023 70%   Final

## 2023-03-23 NOTE — RESULT ENCOUNTER NOTE
Dear Mona    Here are your thyroid labs which are normal!    If you have any questions, please feel free to contact my nurse at [phone] select option #3 for triage nurse  or  option #1 for scheduling related questions.    Regards    Katrin Lopez MD

## 2023-03-23 NOTE — RESULT ENCOUNTER NOTE
Dear Mona    Here is your neck ultrasound.  There are no thyroid nodules.  However there is a deep nodule on the left mid thyroid that is suggestive of a parathyroid nodule.  I will let your surgeon know as this will help inform her plan for surgery.    Dr. Lopez

## 2023-03-24 LAB
PROT C ACT/NOR PPP CHRO: 115 % (ref 70–170)
PROT S FREE AG ACT/NOR PPP IA: 63 % (ref 55–125)

## 2023-03-27 ENCOUNTER — PREP FOR PROCEDURE (OUTPATIENT)
Dept: OTOLARYNGOLOGY | Facility: CLINIC | Age: 33
End: 2023-03-27

## 2023-03-27 ENCOUNTER — OFFICE VISIT (OUTPATIENT)
Dept: OTOLARYNGOLOGY | Facility: CLINIC | Age: 33
End: 2023-03-27
Attending: INTERNAL MEDICINE
Payer: MEDICARE

## 2023-03-27 ENCOUNTER — LAB (OUTPATIENT)
Dept: LAB | Facility: HOSPITAL | Age: 33
End: 2023-03-27
Payer: MEDICARE

## 2023-03-27 VITALS
DIASTOLIC BLOOD PRESSURE: 65 MMHG | HEIGHT: 60 IN | WEIGHT: 169.4 LBS | OXYGEN SATURATION: 97 % | SYSTOLIC BLOOD PRESSURE: 118 MMHG | BODY MASS INDEX: 33.26 KG/M2 | HEART RATE: 101 BPM

## 2023-03-27 DIAGNOSIS — E21.3 HYPERPARATHYROIDISM (H): Primary | ICD-10-CM

## 2023-03-27 DIAGNOSIS — N25.81 SECONDARY RENAL HYPERPARATHYROIDISM (H): ICD-10-CM

## 2023-03-27 DIAGNOSIS — Z94.0 KIDNEY REPLACED BY TRANSPLANT: ICD-10-CM

## 2023-03-27 DIAGNOSIS — Z34.90 PREGNANT: ICD-10-CM

## 2023-03-27 DIAGNOSIS — Z48.298 AFTERCARE FOLLOWING ORGAN TRANSPLANT: ICD-10-CM

## 2023-03-27 DIAGNOSIS — E03.9 ACQUIRED HYPOTHYROIDISM: ICD-10-CM

## 2023-03-27 LAB
ALBUMIN MFR UR ELPH: 5.5 MG/DL (ref 1–14)
ANION GAP SERPL CALCULATED.3IONS-SCNC: 9 MMOL/L (ref 7–15)
BUN SERPL-MCNC: 18.3 MG/DL (ref 6–20)
CALCIUM SERPL-MCNC: 10.6 MG/DL (ref 8.6–10)
CHLORIDE SERPL-SCNC: 105 MMOL/L (ref 98–107)
CREAT SERPL-MCNC: 0.94 MG/DL (ref 0.51–0.95)
CREAT UR-MCNC: 93 MG/DL
DEPRECATED HCO3 PLAS-SCNC: 21 MMOL/L (ref 22–29)
ERYTHROCYTE [DISTWIDTH] IN BLOOD BY AUTOMATED COUNT: 13.3 % (ref 10–15)
GFR SERPL CREATININE-BSD FRML MDRD: 83 ML/MIN/1.73M2
GLUCOSE SERPL-MCNC: 91 MG/DL (ref 70–99)
HCT VFR BLD AUTO: 34.5 % (ref 35–47)
HGB BLD-MCNC: 11.4 G/DL (ref 11.7–15.7)
MCH RBC QN AUTO: 30.8 PG (ref 26.5–33)
MCHC RBC AUTO-ENTMCNC: 33 G/DL (ref 31.5–36.5)
MCV RBC AUTO: 93 FL (ref 78–100)
PLATELET # BLD AUTO: 202 10E3/UL (ref 150–450)
POTASSIUM SERPL-SCNC: 4.6 MMOL/L (ref 3.4–5.3)
PROT/CREAT 24H UR: 0.06 MG/MG CR (ref 0–0.2)
RBC # BLD AUTO: 3.7 10E6/UL (ref 3.8–5.2)
SODIUM SERPL-SCNC: 135 MMOL/L (ref 136–145)
TACROLIMUS BLD-MCNC: 4 UG/L (ref 5–15)
TME LAST DOSE: ABNORMAL H
TME LAST DOSE: ABNORMAL H
WBC # BLD AUTO: 9.3 10E3/UL (ref 4–11)

## 2023-03-27 PROCEDURE — 85027 COMPLETE CBC AUTOMATED: CPT

## 2023-03-27 PROCEDURE — 36415 COLL VENOUS BLD VENIPUNCTURE: CPT

## 2023-03-27 PROCEDURE — 84156 ASSAY OF PROTEIN URINE: CPT

## 2023-03-27 PROCEDURE — 80048 BASIC METABOLIC PNL TOTAL CA: CPT

## 2023-03-27 PROCEDURE — 99204 OFFICE O/P NEW MOD 45 MIN: CPT | Performed by: SURGERY

## 2023-03-27 PROCEDURE — 80197 ASSAY OF TACROLIMUS: CPT

## 2023-03-27 RX ORDER — DEXAMETHASONE SODIUM PHOSPHATE 4 MG/ML
10 INJECTION, SOLUTION INTRA-ARTICULAR; INTRALESIONAL; INTRAMUSCULAR; INTRAVENOUS; SOFT TISSUE ONCE
Status: CANCELLED | OUTPATIENT
Start: 2023-03-27 | End: 2023-03-27

## 2023-03-27 ASSESSMENT — PAIN SCALES - GENERAL: PAINLEVEL: NO PAIN (0)

## 2023-03-27 NOTE — PROGRESS NOTES
Teaching Flowsheet - ENT   Relevant Diagnosis: parathyroidectomy  Teaching Topic:Person(s) involved in teaching: patient       Motivation Level:  Asks Questions:   Yes  Eager to Learn:   Yes  Cooperative:   Yes  Receptive (willing/able to accept information):   Yes  Comments: Reviewed pre-op H and P,  NPO prior to  surgery,  pre-op scrub (given Hibiclens)  Reviewed post-op  cares , activity and pain.     Patient demonstrates understanding of the following:  Reason for the appointment, diagnosis and treatment plan:   Yes  Knowledge of proper use of medications and conditions for which they are ordered (with special attention to potential side effects or drug interactions):  stop aspirin products 1 week before surgery Yes  Which situations necessitate calling provider and whom to contact:   Yes  Nutritional needs and diet plan:   Yes  Pain management techniques:   Yes  Patient instructed on hand hygiene:  Yes  How and/when to access community resources:   Yes     Infection Prevention:  Patient   demonstrates understanding of the following:  Surgical procedure site care taught Yes  Signs and symptoms of infection taught Yes  Wound care taught Yes  Instructional Materials Used/Given: verbal instruction.

## 2023-03-27 NOTE — LETTER
3/27/2023       RE: Mona Wagner  [address]  [place] [address]     Dear Colleague,    Thank you for referring your patient, Mona Wagner, to the [facility] [place] [facility]. Please see a copy of my visit note below.      Instructional Materials Used/Given: verbal instruction.    SURGERY CLINIC CONSULTATION    REASON FOR CONSULTATION:  Mona Wagner was referred by Dr. Lopez for evaluation and discussion of treatment options for tertiary hyperparathyroidism     HISTORY OF PRESENT ILLNESS:  Mona Wagner is a 32 year old female who has a pertinent past medical history of kidney transplant in 2019.  Immediately prior to her kidney transplant her parathyroid hormone level was 1084.  Following her transplant her calcium had decreased as did her parathyroid hormone.  However until recently the patient's calcium had continued to increase.  Most recent labs from earlier this month include a calcium of 11.3 parathyroid hormone level 79 and a low vitamin D (this was measured in 2022).  Dr. Lopez has been attempting to control the patient's calcium however is having a difficult time particularly during the patient's pregnancy.  She is currently at 14 weeks pregnant.  Neck ultrasound identified an area of concern in the right neck.  However only documented a single enlarged suspicious parathyroid gland    Symptoms associated with hyperparathyroidism include muscle aches and fatigue.  She does not have any history of nephrolithiasis or osteoporosis.  No previous or family history of hyperparathyroidism.  That should mention however she does have a history of secondary hyperparathyroidism during her renal failure prior to her transplant      REVIEW OF SYSTEMS:  ROS EXAM: 10 point view of systems is pertinent for that noted in the HPI  Patient Active Problem List   Diagnosis    DVT of upper extremity (deep vein thrombosis) (H)    Seizure  disorder (H)    Acquired hypothyroidism    Increased prolactin level    Aftercare following organ transplant    Immunosuppression (H)    Kidney replaced by transplant    Anxiety    Vitamin D deficiency    CKD (chronic kidney disease) stage 3, GFR 30-59 ml/min (H)    Bicornate uterus    Orthostatic hypotension    Tertiary hyperparathyroidism (H)    High-risk pregnancy in second trimester       Past Surgical History:   Procedure Laterality Date    BENCH KIDNEY N/A 8/3/2019    Procedure: BACKBENCH PREPARATION, ALLOGRAFT, KIDNEY;  Surgeon: Dorian Johnson MD;  [facility]: [facility]    COMBINED CYSTOSCOPY, RETROGRADES, EXCHANGE STENT URETER(S) Right 2020    Procedure: CYSTOSCOPY, CYSOTGRAM, WITH RETROGRADE PYELOGRAM, ureteroscopy,  AND URETERAL STENT;  Surgeon: Wally Britt MD;  Location: [facility] OR    COMBINED CYSTOSCOPY, RETROGRADES, URETEROSCOPY, LASER HOLMIUM LITHOTRIPSY URETER(S), INSERT STENT N/A 2019    Procedure: CYSTOURETEROSCOPY, WITH RETROGRADE PYELOGRAM of transplant kidney, STENT INSERTION, Laser on standby;  Surgeon: Wally Britt MD;  Location[facility]    CREATE FISTULA ARTERIOVENOUS UPPER EXTREMITY  2014    Procedure: CREATE FISTULA ARTERIOVENOUS UPPER EXTREMITY;  Left Wrist Arteriovenous Fistula Placement;  Surgeon: Shahsi Castro MD;  Location: [facility] OR    CREATE FISTULA ARTERIOVENOUS UPPER EXTREMITY      CYSTOSCOPY, RETROGRADES, INSERT STENT URETER(S), COMBINED N/A 2020    Procedure: CYSTOSCOPY, WITH RETROGRADE PYELOGRAM, CYSTOGRAM AND URETERAL STENT INSERTION - TRANSPLANT KIDNEY;  Surgeon: Wally Britt MD;  [facility]    IR CEREBRAL ANGIOGRAM  2014    PERCUTANEOUS BIOPSY KIDNEY Right 2019    Procedure: Right Kidney Biopsy;  Surgeon: Deny Rios MD;  [facility]    RASTA/DIALYSIS CATHETER  12/10/2013         TRANSPLANT KIDNEY RECIPIENT  DONOR N/A 8/3/2019    Procedure: TRANSPLANT, KIDNEY, RECIPIENT,  DONOR with Ureteral  Stent Placement;  Surgeon: Dorian Johnson MD;  Location: [facility] OR       Allergies   Allergen Reactions    Contrast Dye Rash     CT contrast allergy 12/14/19 rash over eyes. Need to have pre medication before a CT WITH CONTRAST     Diatrizoate Rash     CT contrast allergy 12/14/19 rash over eyes. Need to have pre medication before a CT WITH CONTRAST     Lisinopril Swelling     angioedema    Nitrous Oxide Other (See Comments)     Sense of doom    Chlorhexidine Rash     Rash at site    Sulfa Antibiotics Rash     Muscle stiffness of neck    Dapsone      Methemoglobinemia    Furosemide Other (See Comments)     Skin flushing    Metrogel [Metronidazole]      Hives diffusely on body after vaginal administration.    Azithromycin Dizziness and Rash    Cefuroxime Rash       Medications reviewed in the EMR        Family History   Problem Relation Age of Onset    Kidney Disease Mother     Kidney failure Mother     Coronary Artery Disease Father     Cerebrovascular Disease Father     Heart Disease Father     Sleep Apnea Father     Hypertension Sister     Diabetes Sister     Cerebrovascular Disease Sister     Kidney Disease Sister     Breast Cancer No family hx of     Cancer - colorectal No family hx of     Ovarian Cancer No family hx of     Prostate Cancer No family hx of     Other Cancer No family hx of     Asthma No family hx of     Anesthesia Reaction No family hx of     Deep Vein Thrombosis (DVT) No family hx of     Melanoma No family hx of     Skin Cancer No family hx of     Thrombosis No family hx of         PHYSICAL EXAM:  /65 (BP Location: Right arm, Patient Position: Sitting, Cuff Size: Adult Regular)   Pulse 101   Ht 1.524 m (5')   Wt 76.8 kg (169 lb 6.4 oz)   LMP 12/16/2022   SpO2 97%   BMI 33.08 kg/m      Neck: Thyroid gland is small.  I cannot feel any nodules within the thyroid gland or around it.  There is no cervical lymphadenopathy.  Trachea is midline.    I personally reviewed the radiographic  images and laboratory data  ASSESSMENT:   1. Secondary renal hyperparathyroidism (H)        PLAN:   The patient quite likely has tertiary hyperparathyroidism.  This in light of her pregnancy and difficulty in controlling her calcium I think the patient should have surgery within the next 4 to 5 weeks as this would be the best time with the safest time of her pregnancy.  I discussed a neck exploration resection of parathyroid adenoma yet I believe we would have to do 3 or 3-1/2 gland resection.  We further discussed that the patient will be in the hospital for bone hunger which I expect to occur.  We will treat her with calcium magnesium as needed based on her calcium levels postoperatively.  The risks of bleeding infection injury to the recurrent laryngeal nerve or nerves missed parathyroid adenoma permanent hypocalcemia were discussed with the patient.  We will schedule her for surgery    Melissa Jones MD          Again, thank you for allowing me to participate in the care of your patient.      Sincerely,    Melissa Jones MD

## 2023-03-27 NOTE — PATIENT INSTRUCTIONS
"You were seen in the clinic today by Dr. Jones. If you have any questions or concerns after your appointment, please call the clinic at [phone]. Press \"1\" for scheduling, press \"3\" for nurse advice.    2.   Our surgery team will reach out to you within 1-2 weeks to help schedule surgery.      aMry FORREST RN  [facility]  Department of Otolaryngology  [phone]        Surgery Teaching Surgery Teaching      1.You must have a physical exam (called  history and physical ) within 30 days of surgery. You can do this at the PAC clinic or your family clinic. Bring the Pre-Op Surgery Form (found in the surgery packet that you received in the mail) with you to your primary doctor if you chose to have them complete this. If your doctor is in the [facility], they do not need this form.     2. Ask your doctor what medicines are safe before surgery.          * If you are on any blood-thinners, we will need to make a plan with your INR clinic or prescribing doctor of when you need to stop these medications before surgery    3. NO MOTRIN, IBUPROFEN, ASPIRIN, ALEVE, GARLIC SUPPLEMENTS or FISH OIL x 7 days prior to surgery ( to prevent excess bleeding and bruising at time of surgery).    4. For same-day surgery, you must arrange for an adult to take you home from the Center. An adult must stay with you for the first 24 hours after surgery. You cannot drive for 24 hours, or longer if you are still taking narcotic medications.      5. Stop drinking alcohol at least 24 hours before surgery.      6. Stop or at least cut down on smoking 24 hours before surgery.     7. Take a bath or shower the night before and the morning of surgery (refer to surgery packet for extra information). You should use the soap that we mailed to you or gave in clinic (2 small bottles). Use the entire bottle of soap for each shower, washing from the neck down, then rinsing off. Sleep in clean clothing and use clean bedding " sheets. If your doctor does not give you special soap, buy Hibiclens or Katiuska-Stat at the drug store or ask the pharmacist to suggest a brand. Do not put on lotion, powder, perfume, deodorant or make-up after bathing.     8. You can eat a normal meal the night before surgery. Do not eat any solid foods or drink any milk products for 8 hours before surgery. A pre-op nurse will call you the week before surgery to confirm your eating cut-off time, but please listen to this 8-hour rule if you miss their call.     9. You may drink clear liquids until 2 hours before surgery. Clear liquids include water, Gatorade, apple juice, or other liquids you can read through.    10.  If you need FMLA paperwork filled out for your surgery, please send this to us before surgery if you are able (in-person, fax, or mail). DO NOT give this to the pre-op nurses on the day of surgery or it will get lost.    11. Generally, we recommend 1-2 weeks off of work for healing after surgery. If you have a labor-intensive job with heavy lifting, you may need a work-modification and we are able to give you a work letter for this so that you can continue to work.

## 2023-03-28 ENCOUNTER — TELEPHONE (OUTPATIENT)
Dept: ENDOCRINOLOGY | Facility: CLINIC | Age: 33
End: 2023-03-28
Payer: MEDICARE

## 2023-03-28 NOTE — TELEPHONE ENCOUNTER
FUTURE VISIT INFORMATION      SURGERY INFORMATION:    Pre-op with Dr. Jones    Consult: ov 3/27    RECORDS REQUESTED FROM:       Primary Care Provider: Macarena Bowen MD- [facility]    Pertinent Medical History: DVT    Most recent EKG+ Tracin20    Most recent ECHO: 3/20/23    Most recent Cardiac Stress Test: 22    Most recent Sleep Study: 10/8/21

## 2023-03-28 NOTE — TELEPHONE ENCOUNTER
----- Message from Katrin Lopez MD sent at 3/15/2023  1:31 PM CDT -----  Please have pt do neck US sooner than later, I will ask ENT to get her in sooner than later     done under parathyroid ultrasound 3/22

## 2023-03-29 ENCOUNTER — OFFICE VISIT (OUTPATIENT)
Dept: MATERNAL FETAL MEDICINE | Facility: CLINIC | Age: 33
End: 2023-03-29
Attending: ADVANCED PRACTICE MIDWIFE
Payer: MEDICARE

## 2023-03-29 VITALS
SYSTOLIC BLOOD PRESSURE: 101 MMHG | BODY MASS INDEX: 33.4 KG/M2 | HEART RATE: 88 BPM | DIASTOLIC BLOOD PRESSURE: 69 MMHG | WEIGHT: 171 LBS | RESPIRATION RATE: 20 BRPM | OXYGEN SATURATION: 98 %

## 2023-03-29 DIAGNOSIS — Z87.440 HISTORY OF RECURRENT UTIS: ICD-10-CM

## 2023-03-29 DIAGNOSIS — N89.8 VAGINAL DISCHARGE: ICD-10-CM

## 2023-03-29 DIAGNOSIS — N28.89 OTHER SPECIFIED DISORDERS OF KIDNEY AND URETER: ICD-10-CM

## 2023-03-29 DIAGNOSIS — Z94.0 KIDNEY REPLACED BY TRANSPLANT: ICD-10-CM

## 2023-03-29 DIAGNOSIS — O09.92 SUPERVISION OF HIGH RISK PREGNANCY IN SECOND TRIMESTER: Primary | ICD-10-CM

## 2023-03-29 DIAGNOSIS — E07.9 DISORDER OF THYROID, UNSPECIFIED: ICD-10-CM

## 2023-03-29 LAB
ALBUMIN UR-MCNC: NEGATIVE MG/DL
APPEARANCE UR: CLEAR
BACTERIA #/AREA URNS HPF: ABNORMAL /HPF
BILIRUB UR QL STRIP: NEGATIVE
CLUE CELLS: ABNORMAL
COLOR UR AUTO: ABNORMAL
GLUCOSE UR STRIP-MCNC: NEGATIVE MG/DL
HGB UR QL STRIP: NEGATIVE
KETONES UR STRIP-MCNC: NEGATIVE MG/DL
LEUKOCYTE ESTERASE UR QL STRIP: NEGATIVE
MUCOUS THREADS #/AREA URNS LPF: PRESENT /LPF
NITRATE UR QL: NEGATIVE
PH UR STRIP: 6 [PH] (ref 5–7)
RBC URINE: 0 /HPF
SP GR UR STRIP: 1.01 (ref 1–1.03)
SQUAMOUS EPITHELIAL: 3 /HPF
TRICHOMONAS, WET PREP: ABNORMAL
UROBILINOGEN UR STRIP-MCNC: NORMAL MG/DL
WBC URINE: 1 /HPF
WBC'S/HIGH POWER FIELD, WET PREP: ABNORMAL
YEAST, WET PREP: ABNORMAL

## 2023-03-29 PROCEDURE — 87086 URINE CULTURE/COLONY COUNT: CPT | Performed by: ADVANCED PRACTICE MIDWIFE

## 2023-03-29 PROCEDURE — 99213 OFFICE O/P EST LOW 20 MIN: CPT | Performed by: ADVANCED PRACTICE MIDWIFE

## 2023-03-29 PROCEDURE — 87210 SMEAR WET MOUNT SALINE/INK: CPT | Performed by: ADVANCED PRACTICE MIDWIFE

## 2023-03-29 PROCEDURE — G0463 HOSPITAL OUTPT CLINIC VISIT: HCPCS | Performed by: ADVANCED PRACTICE MIDWIFE

## 2023-03-29 PROCEDURE — 81001 URINALYSIS AUTO W/SCOPE: CPT | Performed by: ADVANCED PRACTICE MIDWIFE

## 2023-03-29 ASSESSMENT — PAIN SCALES - GENERAL: PAINLEVEL: NO PAIN (0)

## 2023-03-29 NOTE — NURSING NOTE
Mona reports positive fetal movement, denies pain, denies contractions, leaking of fluid, or bleeding..  Patient denies headache, visual changes, nausea/vomiting, epigastric pain related to preeclampsia.  Pt states she has some lower rt quadrant pain by kidney.  SBAR given to Sonam Toledo CNM. Pt reports she is going to have surgery for a parathyroid nodule at 16-18 weeks of pregnancy. Pt had questions about monitoring her baby during this time and these questions were answered.  Sonam Toledo CNM in to see pt.    Pt is scheduled for f/u obv in 2 weeks and 2/3 complete U/S was added on.      Pt was scheduled and left amb and stable. Delicia Parker RN

## 2023-03-29 NOTE — PROGRESS NOTES
"Maternal fetal Medicine OB Follow up visit.     Mona Wagner  : 1990  MRN: [id number]    CC: OB Follow-up    Subjective:  Mona Wagner is a 32 year old  at 14w5d presenting for routine OB follow-up. Today, she is feeling well. She has decided she does not wish to try Sensipar medication due to little known information about the medication in pregnancy, and is opting instead for a parathyroidectomy. Her surgical team is recommending this to be completed between 16-18 weeks and she wants to know if fetal monitoring will occur.    From an OB standpoint, she describes occasional sharp pain in her LRQ, near where her transplanted kidney is located and she is wondering if this is normal. When pain occurs it is sharp, brief, and precipitated by bending or twisting. Patient denies regular, painful contractions, denies loss of fluid or vaginal bleeding. She does endorse a mild vaginal odor and some burning in the vagina with urination - wondering if she has BV as she had this once before. Has a hx of recurrent UTIs and she does not think this is a symptom of that.         OB Hx:  OB History    Para Term  AB Living   1 0 0 0 0 0   SAB IAB Ectopic Multiple Live Births   0 0 0 0 0      # Outcome Date GA Lbr Robinson/2nd Weight Sex Delivery Anes PTL Lv   1 Current                  Objective:  /69 (BP Location: Left arm, Patient Position: Sitting, Cuff Size: Adult Regular)   Pulse 88   Resp 20   Wt 77.6 kg (171 lb)   LMP 2022   SpO2 98%   BMI 33.40 kg/m      Gen: alert, oriented, NAD  Skin: warm, dry, intact  Respiratory: breathing unlabored, no SOB  Abdominal: gravid, non-tender, fundus cwd, FHTs: 150  Pelvic: deferred  Extremities: WNL  Psych: mood WNL, behavior WNL      OB Ultrasound:  Please see \"imaging\" tab under chart review for today's ultrasound results.      Assessment/Plan:  32 year old  at 14w5d here for follow OB visit.    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 Mcintyre in transplant nephrology. Planning follow up in July.  - Resolution of cHTN and anemia of chronic disease s/p transplant.  - Continue with Tacrolimus (goal 4-6) and Azathioprine as prescribed by nephrology. Frequency of lab evaluation generally guided by their discretion.  - Baseline Cr 0.8-1.1; mild proteinuria UPC ratio 0.25.   - Close management of blood pressure with goal of <130/90  - Early detection and treatment of asymptomatic bacteriuria with urine culture at least every trimester. UA/UC sent today. UA overall normal and UC pending.  - We reviewed that should surgery occur, 16-18 weeks is an appropriate timing. Discussed fetal monitoring with doptones both before and after procedure is usually typical with surgery in the previable timeline. Offered 2/3 complete US leading up to and after surgery per patient preference.      Hypothyroidism:  - Follows with [facility]. Continue current levothyroxine prescription.  - TSH should be checked q4-6 weeks in the first half of pregnancy and adjusted.  - TSH of 0.37 and normal T4 on 2/28/23.     Hx DVT:  - Assessment for inherited thrombophilias including Factor V Leiden and Prothrombin Gene Mutation:  Negative.  - Additional hypercoagulability work up negative.     Hx of bacterial endocarditis:  - Echo on 3/20:  Interpretation Summary  Global and regional left ventricular function is hyperkinetic with an EF >70%.  Right ventricular function, chamber size, wall motion, and thickness are  normal.  Pulmonary artery systolic pressure is normal.  The inferior vena cava cannot be assessed.  No pericardial effusion is present.     Routine PNC:  - Prenatal labs:                Rh: +  antibody: neg               HepB/HIV/RPR/HepC: nonreactive               GC/CT: neg               Rubella: non-immune  Varicella: non-immune               GCT: passed early GCT. Will repeat 24-28 weeks               UC: in process               Pap: in process  - Immunizations: s/p Flu and Covid. Recommend Tdap at 28 weeks  - Genetic screening: NIPT pending. Low-risk NT  - Discussed LQ pain sounds consistent with RL pain of pregnancy. Discussed proper body mechanics to help reduce this from occurring.  - Wet prep: negative. Discussed that if symptoms don't improve with increased hydration, we can treat for BV empirically based on symptoms and patient's hx.     # Surveillance:  - 2/3 complete  - Serial growth US q4 after anatomy scan  - Weekly BPPs at 32 weeks     #Delivery Planning:  - Timing of delivery will be indicated by secondary sequelae, but generally 37-39 weeks.  reserved for usual obstetrical indications.   - Labor analgesia: will discuss at later date  - Feeding: needs to be discussed  - Contraception: needs to be discussed      20 minutes spent on the date of the encounter, doing chart review, history and exam, documentation and further activities as noted.      Sonam Toledo CNM on 3/29/2023 at 11:34 AM

## 2023-03-31 ENCOUNTER — VIRTUAL VISIT (OUTPATIENT)
Dept: SURGERY | Facility: CLINIC | Age: 33
End: 2023-03-31
Payer: MEDICARE

## 2023-03-31 ENCOUNTER — PRE VISIT (OUTPATIENT)
Dept: SURGERY | Facility: CLINIC | Age: 33
End: 2023-03-31

## 2023-03-31 ENCOUNTER — ANESTHESIA EVENT (OUTPATIENT)
Dept: SURGERY | Facility: CLINIC | Age: 33
End: 2023-03-31

## 2023-03-31 DIAGNOSIS — N25.81 SECONDARY RENAL HYPERPARATHYROIDISM (H): ICD-10-CM

## 2023-03-31 DIAGNOSIS — Z94.0 KIDNEY REPLACED BY TRANSPLANT: Primary | ICD-10-CM

## 2023-03-31 DIAGNOSIS — Z94.0 STATUS POST KIDNEY TRANSPLANT: ICD-10-CM

## 2023-03-31 DIAGNOSIS — O09.92 HIGH-RISK PREGNANCY IN SECOND TRIMESTER: ICD-10-CM

## 2023-03-31 DIAGNOSIS — Z01.818 PREOP EXAMINATION: Primary | ICD-10-CM

## 2023-03-31 LAB — BACTERIA UR CULT: NORMAL

## 2023-03-31 PROCEDURE — 99215 OFFICE O/P EST HI 40 MIN: CPT | Mod: VID | Performed by: NURSE PRACTITIONER

## 2023-03-31 ASSESSMENT — ENCOUNTER SYMPTOMS
ORTHOPNEA: 0
SEIZURES: 1

## 2023-03-31 ASSESSMENT — LIFESTYLE VARIABLES: TOBACCO_USE: 0

## 2023-03-31 NOTE — PATIENT INSTRUCTIONS
Preparing for Your Surgery      Name:  Mona Wagner   MRN:  [id number]   :  1990   Today's Date:  3/31/2023       Arriving for surgery:  Surgery date:  No surgery date is scheduled at this time. You will receive a phone call from Pre-Admissions 1-3 business days prior to surgery to confirm surgery date, arrival time, and give you additional instructions.   Pre-Admissions [phone]   Mon-Fri 8 am- 4 pm.  Arrival time:  To be determined     Surgeries and procedures: Adult patients can have 2 visitors all through the surgery process.     Visiting hours: 8 a.m. to 8:30 p.m.     Hospital: Adult patients and children under age 18 can have 4 visitor at a time     No visitors under the age of 5 are allowed for hospital patients.  Double occupancy rooms: Patients can have only two visitors at a time.     Patients with disabilities: Can have a support person with them (family member, service provider     Or someone well informed about their needs) plus the allowed number of visitors     Patients confirmed or suspected to have symptoms of COVID 19 or flu:     No visitors allowed for adult patients.   Children (under age 18) can have 1 named visitor.     People who are sick or showing symptoms of COVID 19 or flu:    Are not allowed to visit patients--we can only make exceptions in special situations.       Please follow these guidelines for your visit:   Arrive wearing a mask over your mouth and nose; we will give you a medical mask to wear    If you arrive wearing a cloth mask.   Keep it on during your entire visit, even when in patient's room.   If you don't wear a mask we'll ask you to leave.     Clean your hands with alcohol hand . Do this when you arrive at and leave the building and patient room,    And again after you touch your mask or anything in the room.     You can t visit if you have a fever, cough, shortness of breath, muscle aches, headaches, sore throat    Or diarrhea      Stay 6 feet away  from others during your visit and between visits     Go directly to and from the room you are visiting.     Stay in the patient s room during your visit. Limit going to other places in the hospital as much as possible     Leave bags and jackets at home or in the car.     For everyone s health, please don t come and go during your visit. That includes for smoking   during your visit.     Please come to:     [facility] [address]  [address] [place]  [address]    -   parking is available in front of the hospital for those with mobility difficulties.     -  Parking is also available at the Patient Visitor Ramp on [facility].    -  When entering the hospital, you will be asked some Covid screening questions and directed to Patient Registration. Patient Registration will then direct you to the 3rd floor Surgery Waiting Room.  [phone]?     - ?If you are in need of directions, wheelchair or escort please stop at the Information Desk in the lobby.  Inform the information person that you are here for surgery; a wheelchair and escort to [address] will be provided.?     What can I eat or drink? Pre-Admissions will give you exact times.  -  You may eat and drink normally up to 8 hours prior to arrival time.   -  You may have clear liquids until 2 hours prior to arrival time.     Examples of clear liquids:  Water  Clear broth  Juices (apple, white grape, white cranberry  and cider) without pulp  Noncarbonated, powder based beverages  (lemonade and Ben-Aid)  Sodas (Sprite, 7-Up, ginger ale and seltzer)  Coffee or tea (without milk or cream)  Gatorade    -  No Alcohol for at least 24 hours before surgery.     Which medicines can I take?  Hold Aspirin for 7 days before surgery.   Hold Supplements for 7 days before surgery.  Hold Ibuprofen (Advil, Motrin) for 7 days before surgery--unless otherwise directed by surgeon.  Hold Naproxen  (Aleve) for 7 days before surgery.     You may continue your Prenatal Vitamin prior to surgery. Hold Prenatal vitamin the morning of surgery.     NO MOTRIN, IBUPROFEN, ASPIRIN, ALEVE, GARLIC SUPPLEMENTS or FISH OIL x 7 days prior to surgery ( to prevent excess bleeding and bruising at time of surgery). (Per ENT instructions.)    -  DO NOT take these medications the day of surgery:  Prenatal vitamin  Miralax    -  PLEASE TAKE these medications the day of surgery:  Levothyroxine (Synthroid)  Tacrolimus  Acetaminophen (Tylenol) if needed  Famotidine (Pepcid) if needed    How do I prepare myself?  - Please take 2 showers (one the night prior to surgery and one the morning of surgery) using Dial soap. (Allergy to Chlorhexidene.)    Use this soap only from the neck to your toes.     Leave the soap on your skin for one minute--then rinse thoroughly.      You may use your own shampoo and conditioner. No other hair products.   - Please remove all jewelry and body piercings.  - No lotions, deodorants or fragrance.  - No makeup or fingernail polish.   - Bring your ID and insurance card.    -If you have a Deep Brain Stimulator, Spinal Cord Stimulator, or any Neuro Stimulator device---you must bring the remote control to the hospital.      ALL PATIENTS GOING HOME THE SAME DAY OF SURGERY ARE REQUIRED TO HAVE A RESPONSIBLE ADULT TO DRIVE AND BE IN ATTENDANCE WITH THEM FOR 24 HOURS FOLLOWING SURGERY.    Covid testing policy as of 12/06/2022  Your surgeon will notify and schedule you for a COVID test if one is needed before surgery--please direct any questions or COVID symptoms to your surgeon      Questions or Concerns:    - For any questions regarding the day of surgery or your hospital stay, please contact the Pre Admission Nursing Office at [phone].       - If you have health changes between today and your surgery, please call your surgeon.       - For questions after surgery, please call your surgeons office.

## 2023-03-31 NOTE — PROGRESS NOTES
Mona is a 32 year old who is being evaluated via a billable video visit.      How would you like to obtain your AVS? MyChart      Subjective   Mona is a 32 year old, presenting for the following health issues:  Pre-Op Exam (/)    HPI         Review of Systems       Physical Exam         VALDEMAR Madera LPN

## 2023-04-07 ENCOUNTER — TELEPHONE (OUTPATIENT)
Dept: OTOLARYNGOLOGY | Facility: CLINIC | Age: 33
End: 2023-04-07
Payer: MEDICARE

## 2023-04-07 NOTE — TELEPHONE ENCOUNTER
Called patient to schedule surgery with Dr. Jones    Date of Surgery: 4/28    [facility] of [facility]    Pre-Op H&P: PAC 3/31 done    Pre/Post Imaging:  Not Applicable    Post-Op Appt Date: 2 weeks    Surgery Packet Mailed: 4/7    Additional comments: BRIAN Neumann on 4/7/2023 at 1:23 PM

## 2023-04-10 ENCOUNTER — LAB (OUTPATIENT)
Dept: LAB | Facility: HOSPITAL | Age: 33
End: 2023-04-10
Payer: MEDICARE

## 2023-04-10 ENCOUNTER — TELEPHONE (OUTPATIENT)
Dept: MATERNAL FETAL MEDICINE | Facility: CLINIC | Age: 33
End: 2023-04-10

## 2023-04-10 DIAGNOSIS — Z94.0 KIDNEY REPLACED BY TRANSPLANT: ICD-10-CM

## 2023-04-10 DIAGNOSIS — Z34.90 PREGNANT: ICD-10-CM

## 2023-04-10 DIAGNOSIS — Z48.298 AFTERCARE FOLLOWING ORGAN TRANSPLANT: ICD-10-CM

## 2023-04-10 LAB
ANION GAP SERPL CALCULATED.3IONS-SCNC: 9 MMOL/L (ref 7–15)
BUN SERPL-MCNC: 18 MG/DL (ref 6–20)
CALCIUM SERPL-MCNC: 10.1 MG/DL (ref 8.6–10)
CHLORIDE SERPL-SCNC: 105 MMOL/L (ref 98–107)
CREAT SERPL-MCNC: 0.92 MG/DL (ref 0.51–0.95)
DEPRECATED HCO3 PLAS-SCNC: 22 MMOL/L (ref 22–29)
ERYTHROCYTE [DISTWIDTH] IN BLOOD BY AUTOMATED COUNT: 13.4 % (ref 10–15)
GFR SERPL CREATININE-BSD FRML MDRD: 85 ML/MIN/1.73M2
GLUCOSE SERPL-MCNC: 91 MG/DL (ref 70–99)
HCT VFR BLD AUTO: 30.2 % (ref 35–47)
HGB BLD-MCNC: 10.1 G/DL (ref 11.7–15.7)
MCH RBC QN AUTO: 31.4 PG (ref 26.5–33)
MCHC RBC AUTO-ENTMCNC: 33.4 G/DL (ref 31.5–36.5)
MCV RBC AUTO: 94 FL (ref 78–100)
PLATELET # BLD AUTO: 199 10E3/UL (ref 150–450)
POTASSIUM SERPL-SCNC: 4.1 MMOL/L (ref 3.4–5.3)
RBC # BLD AUTO: 3.22 10E6/UL (ref 3.8–5.2)
SODIUM SERPL-SCNC: 136 MMOL/L (ref 136–145)
TACROLIMUS BLD-MCNC: 4.3 UG/L (ref 5–15)
TME LAST DOSE: ABNORMAL H
TME LAST DOSE: ABNORMAL H
WBC # BLD AUTO: 9.2 10E3/UL (ref 4–11)

## 2023-04-10 PROCEDURE — 80048 BASIC METABOLIC PNL TOTAL CA: CPT

## 2023-04-10 PROCEDURE — 85027 COMPLETE CBC AUTOMATED: CPT

## 2023-04-10 PROCEDURE — 36415 COLL VENOUS BLD VENIPUNCTURE: CPT

## 2023-04-10 PROCEDURE — 80197 ASSAY OF TACROLIMUS: CPT

## 2023-04-10 NOTE — TELEPHONE ENCOUNTER
Pt called in with complaints of brown vag discharge since last evening when she wipes after using the bathroom she notices some brown discharge about the size of a dime.  Pt also had pain around her umbilicus 2 nights ago at 2 am and 4 am, but none since.  Pt denies fever, denies SRoM and denies bright red vaginal bleeding. Pt denies ctx or cramping.  Pt stated she had intercourse Fri morning.  Writer instructed pt to call for any increased vag bleeding and/or cramping and was instructed on pelvic rest until she has apt on Wed.  Pt verbalized understanding and will call back for questions and concerns. Delicia Parker RN

## 2023-04-12 ENCOUNTER — HOSPITAL ENCOUNTER (OUTPATIENT)
Dept: ULTRASOUND IMAGING | Facility: CLINIC | Age: 33
Discharge: HOME | End: 2023-04-12
Attending: ADVANCED PRACTICE MIDWIFE
Payer: MEDICARE

## 2023-04-12 ENCOUNTER — OFFICE VISIT (OUTPATIENT)
Dept: MATERNAL FETAL MEDICINE | Facility: CLINIC | Age: 33
End: 2023-04-12
Attending: ADVANCED PRACTICE MIDWIFE
Payer: MEDICARE

## 2023-04-12 ENCOUNTER — OFFICE VISIT (OUTPATIENT)
Dept: MATERNAL FETAL MEDICINE | Facility: CLINIC | Age: 33
End: 2023-04-12
Attending: OBSTETRICS & GYNECOLOGY
Payer: MEDICARE

## 2023-04-12 VITALS
SYSTOLIC BLOOD PRESSURE: 121 MMHG | BODY MASS INDEX: 34.22 KG/M2 | RESPIRATION RATE: 20 BRPM | OXYGEN SATURATION: 99 % | DIASTOLIC BLOOD PRESSURE: 79 MMHG | WEIGHT: 175.2 LBS | HEART RATE: 85 BPM

## 2023-04-12 DIAGNOSIS — O09.92 SUPERVISION OF HIGH RISK PREGNANCY IN SECOND TRIMESTER: ICD-10-CM

## 2023-04-12 DIAGNOSIS — Z94.0 KIDNEY REPLACED BY TRANSPLANT: ICD-10-CM

## 2023-04-12 DIAGNOSIS — O99.012 ANEMIA DURING PREGNANCY IN SECOND TRIMESTER: ICD-10-CM

## 2023-04-12 DIAGNOSIS — Z94.0 CURRENT PREGNANCY IN SECOND TRIMESTER WITH HISTORY OF KIDNEY TRANSPLANTATION: Primary | ICD-10-CM

## 2023-04-12 DIAGNOSIS — O09.92 SUPERVISION OF HIGH RISK PREGNANCY IN SECOND TRIMESTER: Primary | ICD-10-CM

## 2023-04-12 DIAGNOSIS — O35.BXX0 ECHOGENIC FOCUS OF HEART OF FETUS AFFECTING ANTEPARTUM CARE OF MOTHER, SINGLE OR UNSPECIFIED FETUS: ICD-10-CM

## 2023-04-12 DIAGNOSIS — O09.892 CURRENT PREGNANCY IN SECOND TRIMESTER WITH HISTORY OF KIDNEY TRANSPLANTATION: Primary | ICD-10-CM

## 2023-04-12 LAB
ALBUMIN MFR UR ELPH: 7.1 MG/DL (ref 1–14)
CREAT UR-MCNC: 110.2 MG/DL
PROT/CREAT 24H UR: 0.06 MG/MG CR (ref 0–0.2)

## 2023-04-12 PROCEDURE — 76805 OB US >/= 14 WKS SNGL FETUS: CPT | Mod: 26 | Performed by: OBSTETRICS & GYNECOLOGY

## 2023-04-12 PROCEDURE — 84156 ASSAY OF PROTEIN URINE: CPT | Performed by: ADVANCED PRACTICE MIDWIFE

## 2023-04-12 PROCEDURE — G0463 HOSPITAL OUTPT CLINIC VISIT: HCPCS | Mod: 25 | Performed by: ADVANCED PRACTICE MIDWIFE

## 2023-04-12 PROCEDURE — 76805 OB US >/= 14 WKS SNGL FETUS: CPT

## 2023-04-12 PROCEDURE — 99213 OFFICE O/P EST LOW 20 MIN: CPT | Mod: 25 | Performed by: ADVANCED PRACTICE MIDWIFE

## 2023-04-12 RX ORDER — MULTIVIT WITH MINERALS/LUTEIN
250 TABLET ORAL DAILY
Qty: 90 TABLET | Refills: 1 | Status: SHIPPED | OUTPATIENT
Start: 2023-04-12 | End: 2023-05-15

## 2023-04-12 RX ORDER — LANOLIN ALCOHOL/MO/W.PET/CERES
1000 CREAM (GRAM) TOPICAL DAILY
Qty: 90 TABLET | Refills: 1 | Status: SHIPPED | OUTPATIENT
Start: 2023-04-12 | End: 2023-09-08

## 2023-04-12 RX ORDER — FERROUS SULFATE 325(65) MG
325 TABLET ORAL
Qty: 90 TABLET | Refills: 1 | Status: SHIPPED | OUTPATIENT
Start: 2023-04-12 | End: 2023-05-15

## 2023-04-12 ASSESSMENT — PAIN SCALES - GENERAL: PAINLEVEL: NO PAIN (0)

## 2023-04-12 NOTE — PROGRESS NOTES
Maternal fetal Medicine OB Follow up visit.     Mona Wagner  : 1990  MRN: [id number]    CC: OB Follow-up    Subjective:  Mona Wagner is a 32 year old  at 16w5d presenting for routine OB follow-up. Today, she is feeling well. She has decided to proceed with parathyroid surgery to address her hypercalcemia and this is planned for .     She does report a recent increase in headaches. She took tylenol for this yesterday and that resolved the pain. No vision changes or RUQ/epigastric pain. She does also note an increased in pedal and hand edema. Hand swelling is noticed in the morning, but resolves throughout the day, and pedal edema is worsened by the end of the day, but improves with rest by morning. She had a recent CBC drawn and noticed her hemoglobin was down. Wondering if she should take an additional iron supplement.    Additionally, she was told outside of pregnancy that she has a mild umbilical hernia. She reports that this is causing some discomfort with certain position changes.    From an OB standpoint, she denies regular, painful contractions, denies loss of fluid or vaginal bleeding. Reports fetal movement. Last Thursday she did have intercourse and on Saturday had some brown spotting discharge. It resolved on , but then returned a few days later.           OB Hx:  OB History    Para Term  AB Living   1 0 0 0 0 0   SAB IAB Ectopic Multiple Live Births   0 0 0 0 0      # Outcome Date GA Lbr Robinson/2nd Weight Sex Delivery Anes PTL Lv   1 Current                  Objective:  /79 (BP Location: Left arm, Patient Position: Sitting, Cuff Size: Adult Regular)   Pulse 85   Resp 20   Wt 79.5 kg (175 lb 3.2 oz)   LMP 2022   SpO2 99%   BMI 34.22 kg/m      Gen: alert, oriented, NAD  Skin: warm, dry, intact  Respiratory: breathing unlaboed, no SOB  Abdominal: gravid, non-tender, no visible hernia noted or palpated.  Pelvic: deferred  Extremities: WNL  Psych: mood WNL,  "behavior WNL      OB Ultrasound:  Please see \"imaging\" tab under chart review for today's ultrasound results.      Assessment/Plan:  32 year old  at 16w5d here for follow OB visit.    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 Mcintyre in transplant nephrology. Planning follow up on .  - Resolution of cHTN and anemia of chronic disease s/p transplant.  - Continue with Tacrolimus (goal 4-6) and Azathioprine as prescribed by nephrology. Frequency of lab evaluation generally guided by their discretion.  - Baseline Cr 0.8-1.1; mild proteinuria UPC ratio 0.25.   - Close management of blood pressure with goal of <130/90  - Early detection and treatment of asymptomatic bacteriuria with urine culture at least every trimester. UA/UC last on 3/29 and WNL.  - Planning resection of parathyroid glands on . Per pre-op notes, surgery is scheduled on the [facility] to have access to OB and OB anesthesia. Reviewed that fetal doptones will be monitored both before and after surgery, but continuous fetal monitoring is not indicated given GA.     Hypothyroidism:  - Follows with endo. Continue current levothyroxine prescription.  - TSH should be checked q4-6 weeks in the first half of pregnancy and adjusted.  - TSH of 0.37 and normal T4 on 23.     Hx DVT:  - Assessment for inherited thrombophilias including Factor V Leiden and Prothrombin Gene Mutation:  Negative.  - Additional hypercoagulability work up negative.     Hx of bacterial endocarditis:  - Echo on 3/20:  Interpretation Summary  Global and regional left ventricular function is hyperkinetic with an EF >70%.  Right ventricular function, chamber size, wall " motion, and thickness are  normal.  Pulmonary artery systolic pressure is normal.  The inferior vena cava cannot be assessed.  No pericardial effusion is present.     Routine PNC:  - Prenatal labs:               Rh: +  antibody: neg               HepB/HIV/RPR/HepC: nonreactive               GC/CT: neg               Rubella: non-immune  Varicella: non-immune               GCT: passed early GCT. Will repeat 24-28 weeks               UC: no growth               Pap: NIL and HPV neg  - Immunizations: s/p Flu and Covid. Recommend Tdap at 28 weeks  - Genetic screening: NIPT low-risk. Low-risk NT  - Discussed that in pregnancy comfort measures are recommended for umbilical hernia.   - Reviewed that brown spotting can occur after intercourse and that in the absense of cramping or contractions, is likely due to highly vascular cervix.       Surveillance:  - 2/3 complete  - Serial growth US q4 after anatomy scan  - Weekly BPPs at 32 weeks     Delivery Planning:  - Timing of delivery will be indicated by secondary sequelae, but generally 37-39 weeks.  reserved for usual obstetrical indications.   - Labor analgesia: will discuss at later date  - Feeding: needs to be discussed  - Contraception: needs to be discussed    RTC in 2 weeks for OB visit prior to surgery and 4 weeks for comprehensive US and OBV.    25 minutes spent on the date of the encounter, doing chart review, history and exam, documentation and further activities as noted.      Sonam Toledo CNM on 2023 at 12:11 PM

## 2023-04-12 NOTE — NURSING NOTE
Patient reports + fetal movement, denies pain, denies contractions, leaking of fluid, or bleeding.  .  Patient denies headache, visual changes, nausea/vomiting, epigastric pain related to preeclampsia.  Education provided to patient.    UTI question assessment done  H/O DVT/cardiovascular question assessment done  PTL Assessment done  PPROM assessment done  Vaginal infection question assessment done.  Hypothyroid assessment done to evaluate for increased HR and palpitations.  Pt reports abdominal pain and is questioning if it is a hernia.  Pt reports increased vaginal discharge after intercourse  Sonam Toledo in to see pt.  Pt has parathyroid surgery scheduled for 4/28.  Pt will have f/u obv in 2 weeks and f/u comp f/u OBV in 4 weeks.  SBAR given to LARRY MOELLER, see their note in Epic.  Pt left amb and stable. Delicia Parker RN

## 2023-04-13 ENCOUNTER — OFFICE VISIT (OUTPATIENT)
Dept: NEPHROLOGY | Facility: CLINIC | Age: 33
End: 2023-04-13
Attending: INTERNAL MEDICINE
Payer: MEDICARE

## 2023-04-13 VITALS
OXYGEN SATURATION: 98 % | BODY MASS INDEX: 34.37 KG/M2 | SYSTOLIC BLOOD PRESSURE: 110 MMHG | HEART RATE: 89 BPM | WEIGHT: 176 LBS | DIASTOLIC BLOOD PRESSURE: 68 MMHG

## 2023-04-13 DIAGNOSIS — O09.92 HIGH-RISK PREGNANCY IN SECOND TRIMESTER: ICD-10-CM

## 2023-04-13 DIAGNOSIS — D84.9 IMMUNOSUPPRESSION (H): ICD-10-CM

## 2023-04-13 DIAGNOSIS — Z94.0 KIDNEY REPLACED BY TRANSPLANT: Primary | ICD-10-CM

## 2023-04-13 DIAGNOSIS — Z48.298 AFTERCARE FOLLOWING ORGAN TRANSPLANT: ICD-10-CM

## 2023-04-13 PROCEDURE — G0463 HOSPITAL OUTPT CLINIC VISIT: HCPCS | Performed by: INTERNAL MEDICINE

## 2023-04-13 PROCEDURE — 99214 OFFICE O/P EST MOD 30 MIN: CPT | Mod: GC | Performed by: INTERNAL MEDICINE

## 2023-04-13 RX ORDER — FLUTICASONE PROPIONATE 50 MCG
1 SPRAY, SUSPENSION (ML) NASAL DAILY PRN
Qty: 16 G | Refills: 1 | Status: ON HOLD | COMMUNITY
Start: 2023-04-13 | End: 2023-04-28

## 2023-04-13 NOTE — PATIENT INSTRUCTIONS
Patient Recommendations:  - Labs every 2 weeks for now until 3rd trimester where labs will increase to weekly    Transplant Patient Information  Your Post Transplant Coordinator is: Angeles Leroy (variable coordinator coverage while she is on maternity leave)  For non urgent items, we encourage you to contact your coordinator/care team online via [employer]  You and your care team can also contact your transplant coordinator Monday - Friday, 8am - 5pm at [phone] (Option 2 to reach the coordinator or Option 4 to schedule an appointment).  After hours for urgent matters, please call [facility] at [phone].

## 2023-04-13 NOTE — NURSING NOTE
Chief Complaint   Patient presents with     RECHECK     S/p kidney tx     Blood pressure 110/68, pulse 89, weight 79.8 kg (176 lb), last menstrual period 12/16/2022, SpO2 98 %, not currently breastfeeding.    Zander Rangel on 4/13/2023 at 9:41 AM

## 2023-04-13 NOTE — PROGRESS NOTES
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    - Baseline Cr ~ 0.8-1.1   - Proteinuria: Normal (<0.2 grams)   - Date DSA Last Checked: Sep/2022      Latest DSA: No   - BK Viremia: No   - Kidney Tx Biopsy: Sep 17, 2019; Result: No diagnostic evidence of acute rejection.     -Labs q2 weeks until 3rd trimester, and then weekly    # Immunosuppression: Tacrolimus immediate release (goal 4-6) and Azathioprine (dose 150 mg daily)    - Continue with intensive monitoring of immunosuppression for efficacy and toxicity   - Changes: No.     # Infection Prophylaxis:   Last CD4 Level: 201 ( July 2020)   - PJP: None    # Orthostatic Hypotension: Controlled, but low at times on Florinef 0.1mg MWF prior to pregnancy. Now on hold;  Goal BP: > 100, but < 130 systolic   - Changes: No. Class C during pregnancy so would try to hold if she could tolerate     # Anemia in Chronic Renal Disease: Hgb: Trend down, now normal      CHARLINE: No   - Iron studies: Replete, recheck iron studies. Hasn't started taking iron pill yet. Will start after parathyroidectomy.     # Mineral Bone Disorder:   - Secondary renal hyperparathyroidism; PTH level: Normal (15-65 pg/ml)    Most recent is normal but could be considered high for calcium level    On treatment: No. Plan for subtotal parathyroidectomy as above  - Vitamin D; level: Low        On Supplement: No; Not on treatment due to hypercalcemia  - Calcium; level: High        On Supplement: No. Off of sensipar due to pregnancy. Plan for subtotal parathyroidectomy    # Electrolytes:   - Potassium; level: Normal        On supplement: No  - Magnesium; level: Normal        On supplement: Yes  - Bicarbonate; level: Normal        On supplement: No    # Ureteral stenosis:   -  Stent removed Feb 2021. Follows with urology . Trying to avoid further stents due to recurrent  UTIs              -  Allograft US July 2021: moderate hydronephrosis unchanged with voiding. Unchanged on 7/6/22 abdominal CT   -If Scr increases during pregnancy would perform U/S as there would be concern that ureteral stenosis could be exacerbated    # Hypercalcemia:   -Due to tertiary hyperparathyroidism vs secreting parathyroid adenoma   -3/22/23 U/S parathyroids with hypoechoic structure deep to mid L thyroid lobe   -Seen by Dr. Jones 3/27/23 with plans for subtotal parathyroidectomy on 4/28/23   -Ok to hold off on starting sensipar   -CT A/P 7/6/22 with 1mm non obstructing stone              -Using famotidine for GERD rather than TUMS    # Hyperprolactinemia: Normal prolactin level with last check.  Felt secondary to hypothyroidism versus kidney failure, or less likely now a pituitary microadenoma.  Followed by Endocrinology.    # Second trimester of pregnancy:               - Pt doing well overall. Following up regularly with MFM   -Currently 16w6d pregnant                # R Axillary node:   -Referred to general surgery for excisional biopsy in 8/2022 but decision was made to watch the node. Recommend excisional biopsy post partum.    -Asked to continue to observe size of node    # Medical Compliance: Yes     # COVID-19 Virus Review: Discussed COVID-19 virus and the potential medical risks.  Reviewed preventative health recommendations, which includes washing hands for 20 seconds, avoid touching your face, and social distancing.  Asked patient to inform the transplant center if they are exposed or diagnosed with this virus.    # COVID Vaccine Completed: Yes    # Transplant History:  Etiology of Kidney Failure: Unknown etiology (no kidney biopsy)  Tx: DDKT  Transplant: 8/3/2019 (Kidney)  Donor Type: Donation after Circulatory Death  Donor Class: Standard Criteria Donor  Crossmatch at time of Tx: negative  DSA at time of Tx: No  Significant changes in immunosuppression: None  Significant  transplant-related complications: None    Transplant Office Phone Number: [phone]    Assessment and plan was discussed with the patient and she voiced her understanding and agreement.    Return visit: Return in about 2 months (around 6/13/2023) for follow up with any transplant nephrologist, prefer RKT.     This pt was staffed with MD Jas Chadwick MD  Internal Medicine PGY1     Physician Attestation   I, Gelacio Mcintyre MD, personally examined and evaluated this patient.  I discussed the patient with the resident/fellow and care team, and agree with the assessment and plan of care as documented in the note on 04/13/23 .      I personally reviewed vital signs, medications, labs, and imaging.  Gelacio Mcintyre MD  Date of Service (when I saw the patient): 04/13/23        Chief Complaint   Ms. Wagner is a 32 year old here for kidney transplant and immunosuppression management.     History of Present Illness      Interval history   Ms. Wagner has a plan for subtotal parathyroidectomy with Dr. Jones on 4/28/23. She currently feels well. She has some slight swelling of her hands and feet. BP has been well controlled at home. She denies abdominal pain, vomiting, diarrhea, fever, chills, SOB, chest pain.       Home BP: 100 -110s systolic      Problem List   Patient Active Problem List   Diagnosis     DVT of upper extremity (deep vein thrombosis) (H)     Seizure disorder (H)     Acquired hypothyroidism     Increased prolactin level     Aftercare following organ transplant     Immunosuppression (H)     Kidney replaced by transplant     Anxiety     Secondary renal hyperparathyroidism (H)     Vitamin D deficiency     CKD (chronic kidney disease) stage 3, GFR 30-59 ml/min (H)     Stricture or kinking of ureter     Bicornate uterus     Hydronephrosis of kidney transplant     Orthostatic hypotension     Tertiary hyperparathyroidism (H)     High-risk pregnancy in first trimester       Social History    Social History     Tobacco Use     Smoking status: Never     Smokeless tobacco: Never   Vaping Use     Vaping status: Never Used   Substance Use Topics     Alcohol use: No     Alcohol/week: 0.0 standard drinks of alcohol     Drug use: No       Allergies   Allergies   Allergen Reactions     Contrast Dye Rash     CT contrast allergy 12/14/19 rash over eyes. Need to have pre medication before a CT WITH CONTRAST      Diatrizoate Rash     CT contrast allergy 12/14/19 rash over eyes. Need to have pre medication before a CT WITH CONTRAST      Lisinopril Swelling     angioedema     Nitrous Oxide Other (See Comments)     Sense of doom     Chlorhexidine Rash     Rash at site     Sulfa Drugs Rash     Muscle stiffness of neck     Dapsone      Methemoglobinemia     Furosemide Other (See Comments)     Skin flushing     Metrogel [Metronidazole]      Hives diffusely on body after vaginal administration.     Azithromycin Dizziness and Rash     Cefuroxime Rash       Medications   Current Outpatient Medications   Medication Sig     acetaminophen (TYLENOL) 325 MG tablet Take 2 tablets (650 mg) by mouth every 4 hours as needed for mild pain     aspirin (ASA) 81 MG chewable tablet Take 1 tablet (81 mg) by mouth daily (Patient taking differently: Take 81 mg by mouth every evening)     azaTHIOprine (IMURAN) 50 MG tablet Take 3 tablets (150 mg) by mouth daily (Patient taking differently: Take 150 mg by mouth every evening)     cinacalcet (SENSIPAR) 30 MG tablet Take 1 tablet (30 mg) by mouth every other day (Patient not taking: Reported on 2/28/2023)     cyanocobalamin (VITAMIN B-12) 1000 MCG tablet Take 1 tablet (1,000 mcg) by mouth daily     diphenhydrAMINE (BENADRYL) 25 MG capsule Take 1 capsule (25 mg) by mouth daily as needed for itching or allergies Take 1-2 tablets by mouth every 6 hours PRN itching/allergies (Patient not taking: Reported on 2/28/2023)     EPINEPHrine (ANY BX GENERIC EQUIV) 0.3 MG/0.3ML injection 2-pack       famotidine (PEPCID) 20 MG tablet Take 1 tablet (20 mg) by mouth 2 times daily (Patient taking differently: Take 20 mg by mouth as needed)     ferrous sulfate (FEROSUL) 325 (65 Fe) MG tablet Take 1 tablet (325 mg) by mouth daily (with breakfast)     fluticasone (FLONASE) 50 MCG/ACT nasal spray Spray 1 spray into both nostrils daily (Patient not taking: Reported on 3/15/2023)     hydrOXYzine (ATARAX) 10 MG tablet Take 1 tablet (10 mg) by mouth 3 times daily as needed for anxiety (Patient not taking: Reported on 3/31/2023)     levothyroxine (SYNTHROID/LEVOTHROID) 25 MCG tablet TAKE 1.5 tablet daily 6 days per week and 1 tablet daily on the 7th day ( will be titrating up) (Patient taking differently: Take 25 mcg by mouth every morning TAKE 1.5 tablet daily 6 days per week and 1 tablet daily on the 7th day ( will be titrating up))     polyethylene glycol (MIRALAX) 17 GM/Dose powder Take 17 g (1 capful) by mouth daily as needed for constipation     Prenatal Vit-Fe Fumarate-FA (PRENATAL MULTIVITAMIN W/IRON) 27-0.8 MG tablet Take 1 tablet by mouth daily (Patient taking differently: Take 1 tablet by mouth every evening)     Prenatal Vit-Fe Fumarate-FA (PRENATAL VITAMINS) 28-0.8 MG TABS Take 1 tablet by mouth daily (Patient taking differently: Take 1 tablet by mouth daily)     simethicone (MYLICON) 125 MG chewable tablet Take 1 tablet (125 mg) by mouth 4 times daily as needed for intestinal gas     tacrolimus (GENERIC EQUIVALENT) 0.5 MG capsule Tacrolimus 0.5mg - take 1 capsule by mouth twice daily, with 1mg capsules for a total dose of 3.5 twice daily. (Patient taking differently: Take 0.5 mg by mouth 2 times daily Tacrolimus 0.5mg - take 1 capsule by mouth twice daily, with 1mg capsules for a total dose of 3.5 twice daily.)     tacrolimus (GENERIC EQUIVALENT) 1 MG capsule Tacrolimus 1 mg - take 3 capsules by mouth twice a day, with 0.5mg capsules for total dose of 3.5mg twice daily (Patient taking differently: Take 3 mg by  mouth 2 times daily Tacrolimus 1 mg - take 3 capsules by mouth twice a day, with 0.5mg capsules for total dose of 3.5mg twice daily)     vitamin C (ASCORBIC ACID) 250 MG tablet Take 1 tablet (250 mg) by mouth daily     No current facility-administered medications for this visit.     Medications Discontinued During This Encounter   Medication Reason     cinacalcet (SENSIPAR) 30 MG tablet      diphenhydrAMINE (BENADRYL) 25 MG capsule      fluticasone (FLONASE) 50 MCG/ACT nasal spray      hydrOXYzine (ATARAX) 10 MG tablet      Prenatal Vit-Fe Fumarate-FA (PRENATAL VITAMINS) 28-0.8 MG TABS         Physical Exam    Blood pressure 110/68, pulse 89, weight 79.8 kg (176 lb), last menstrual period 12/16/2022, SpO2 98 %, not currently breastfeeding.   GENERAL APPEARANCE: alert and no distress  HENT: mouth without ulcers or lesions  LYMPHATICS: no cervical or supraclavicular nodes  RESP: lungs clear to auscultation - no rales, rhonchi or wheezes  CV: regular rhythm, normal rate, no rub, no murmur  EDEMA: no LE edema bilaterally  ABDOMEN: soft, nondistended, nontender, bowel sounds normal  MS: extremities normal - no gross deformities noted, no evidence of inflammation in joints, no muscle tenderness  SKIN: no rash  NEURO: normal strength and tone, sensory exam grossly normal, mentation intact and speech normal  PSYCH: mentation appears normal and affect normal/bright  TX KIDNEY: normal      Data         Latest Ref Rng & Units 4/10/2023    10:28 AM 3/27/2023    10:19 AM 3/15/2023     9:52 AM   Renal   Sodium 136 - 145 mmol/L 136   135   137     K 3.4 - 5.3 mmol/L 4.1   4.6   4.0     Cl 98 - 107 mmol/L 105   105   105     Cl (external) 98 - 107 mmol/L 105   105   105     CO2 22 - 29 mmol/L 22   21   23     Urea Nitrogen 6.0 - 20.0 mg/dL 18.0   18.3   15.4     Creatinine 0.51 - 0.95 mg/dL 0.92   0.94   0.83     Glucose 70 - 99 mg/dL 91   91   126     Calcium 8.6 - 10.0 mg/dL 10.1   10.6   11.3           Latest Ref Rng & Units  2/6/2023    10:23 AM 1/18/2023     9:49 AM 7/13/2022    12:20 PM   Bone Health   Parathyroid Hormone Intact 15 - 65 pg/mL 59   79      Vit D Def 20 - 75 ug/L   14           Latest Ref Rng & Units 4/10/2023    10:28 AM 3/27/2023    10:19 AM 3/15/2023     9:52 AM   Heme   WBC 4.0 - 11.0 10e3/uL 9.2   9.3   8.9     Hgb 11.7 - 15.7 g/dL 10.1   11.4   11.7     Plt 150 - 450 10e3/uL 199   202   205           Latest Ref Rng & Units 6/22/2022     6:39 AM 8/4/2020     9:05 AM 2/3/2020    10:29 AM   Liver   AP 45 - 120 U/L 110   185   126     TBili 0.0 - 1.0 mg/dL 0.9   0.7   0.6     Bilirubin Direct 0.0 - 0.2 mg/dL  <0.1   0.1     ALT 0 - 45 U/L 10   19   20     AST 0 - 40 U/L 16   11   13     Tot Protein 6.0 - 8.0 g/dL 8.0   7.9   7.4     Albumin 3.5 - 5.0 g/dL 4.4   4.0   3.7           Latest Ref Rng & Units 2/6/2023    10:23 AM 6/22/2022     6:39 AM 8/4/2020     9:05 AM   Pancreas   A1C <5.7 % 5.2    5.5     Lipase 0 - 52 U/L  39            Latest Ref Rng & Units 7/13/2022    12:20 PM 8/4/2020     9:05 AM 2/17/2020     8:50 AM   Iron studies   Iron 35 - 180 ug/dL 51   64   95     Iron Saturation Index 15 - 46 % 24   32   42     Ferritin 12 - 150 ng/mL 1,111   1,065   1,139           Latest Ref Rng & Units 7/7/2021     9:20 AM 6/2/2021     9:25 AM 5/3/2021     9:02 AM   [facility] Txp Virology   BK Spec  Plasma   Plasma   Plasma     BK Res BKNEG^BK Virus DNA Not Detected copies/mL BK Virus DNA Not Detected   BK Virus DNA Not Detected   BK Virus DNA Not Detected     BK Log <2.7 Log copies/mL Not Calculated   Not Calculated   Not Calculated          Recent Labs   Lab Test 03/15/23  0952 03/27/23  1019 04/10/23  1028   DOSTAC 3/14/2023 3/26/2023 4/9/2023   TACROL 4.0* 4.0* 4.3*     Recent Labs   Lab Test 08/16/19  0807 09/03/19  0944   DOSMPA Not Provided 0840pm   MPACID 1.92 3.39   MPAG 149.2* 52.8

## 2023-04-13 NOTE — LETTER
4/13/2023       RE: Mona Wagner  [address]  [place] [address]     Dear Colleague,    Thank you for referring your patient, Mona Wagner, to the [facility] [place] at [facility]. Please see a copy of my visit note below.    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    - Baseline Cr ~ 0.8-1.1   - Proteinuria: Normal (<0.2 grams)   - Date DSA Last Checked: Sep/2022      Latest DSA: No   - BK Viremia: No   - Kidney Tx Biopsy: Sep 17, 2019; Result: No diagnostic evidence of acute rejection.     -Labs q2 weeks until 3rd trimester, and then weekly    # Immunosuppression: Tacrolimus immediate release (goal 4-6) and Azathioprine (dose 150 mg daily)    - Continue with intensive monitoring of immunosuppression for efficacy and toxicity   - Changes: No.     # Infection Prophylaxis:   Last CD4 Level: 201 ( July 2020)   - PJP: None    # Orthostatic Hypotension: Controlled, but low at times on Florinef 0.1mg MWF prior to pregnancy. Now on hold;  Goal BP: > 100, but < 130 systolic   - Changes: No. Class C during pregnancy so would try to hold if she could tolerate     # Anemia in Chronic Renal Disease: Hgb: Trend down, now normal      CHARLINE: No   - Iron studies: Replete, recheck iron studies. Hasn't started taking iron pill yet. Will start after parathyroidectomy.     # Mineral Bone Disorder:   - Secondary renal hyperparathyroidism; PTH level: Normal (15-65 pg/ml)    Most recent is normal but could be considered high for calcium level    On treatment: No. Plan for subtotal parathyroidectomy as above  - Vitamin D; level: Low        On Supplement: No; Not on treatment due to hypercalcemia  - Calcium; level: High        On Supplement: No. Off of sensipar due to pregnancy. Plan for subtotal  parathyroidectomy    # Electrolytes:   - Potassium; level: Normal        On supplement: No  - Magnesium; level: Normal        On supplement: Yes  - Bicarbonate; level: Normal        On supplement: No    # Ureteral stenosis:   -  Stent removed Feb 2021. Follows with urology . Trying to avoid further stents due to recurrent UTIs              -  Allograft US July 2021: moderate hydronephrosis unchanged with voiding. Unchanged on 7/6/22 abdominal CT   -If Scr increases during pregnancy would perform U/S as there would be concern that ureteral stenosis could be exacerbated    # Hypercalcemia:   -Due to tertiary hyperparathyroidism vs secreting parathyroid adenoma   -3/22/23 U/S parathyroids with hypoechoic structure deep to mid L thyroid lobe   -Seen by Dr. Jones 3/27/23 with plans for subtotal parathyroidectomy on 4/28/23   -Ok to hold off on starting sensipar   -CT A/P 7/6/22 with 1mm non obstructing stone              -Using famotidine for GERD rather than TUMS    # Hyperprolactinemia: Normal prolactin level with last check.  Felt secondary to hypothyroidism versus kidney failure, or less likely now a pituitary microadenoma.  Followed by Endocrinology.    # Second trimester of pregnancy:               - Pt doing well overall. Following up regularly with MFM   -Currently 16w6d pregnant                # R Axillary node:   -Referred to general surgery for excisional biopsy in 8/2022 but decision was made to watch the node. Recommend excisional biopsy post partum.    -Asked to continue to observe size of node    # Medical Compliance: Yes     # COVID-19 Virus Review: Discussed COVID-19 virus and the potential medical risks.  Reviewed preventative health recommendations, which includes washing hands for 20 seconds, avoid touching your face, and social distancing.  Asked patient to inform the transplant center if they are exposed or diagnosed with this virus.    # COVID Vaccine Completed: Yes    # Transplant  History:  Etiology of Kidney Failure: Unknown etiology (no kidney biopsy)  Tx: DDKT  Transplant: 8/3/2019 (Kidney)  Donor Type: Donation after Circulatory Death  Donor Class: Standard Criteria Donor  Crossmatch at time of Tx: negative  DSA at time of Tx: No  Significant changes in immunosuppression: None  Significant transplant-related complications: None    Transplant Office Phone Number: [phone]    Assessment and plan was discussed with the patient and she voiced her understanding and agreement.    Return visit: Return in about 2 months (around 6/13/2023) for follow up with any transplant nephrologist, prefer RKT.     This pt was staffed with MD Jas Chadwick MD  Internal Medicine PGY1     Physician Attestation   I, Gelacio Mcintyre MD, personally examined and evaluated this patient.  I discussed the patient with the resident/fellow and care team, and agree with the assessment and plan of care as documented in the note on 04/13/23 .      I personally reviewed vital signs, medications, labs, and imaging.  Gelacio Mcintyre MD  Date of Service (when I saw the patient): 04/13/23        Chief Complaint   Ms. Wagner is a 32 year old here for kidney transplant and immunosuppression management.     History of Present Illness      Interval history   Ms. Wagner has a plan for subtotal parathyroidectomy with Dr. Jones on 4/28/23. She currently feels well. She has some slight swelling of her hands and feet. BP has been well controlled at home. She denies abdominal pain, vomiting, diarrhea, fever, chills, SOB, chest pain.       Home BP: 100 -110s systolic      Problem List   Patient Active Problem List   Diagnosis    DVT of upper extremity (deep vein thrombosis) (H)    Seizure disorder (H)    Acquired hypothyroidism    Increased prolactin level    Aftercare following organ transplant    Immunosuppression (H)    Kidney replaced by transplant    Anxiety    Secondary renal hyperparathyroidism (H)     Vitamin D deficiency    CKD (chronic kidney disease) stage 3, GFR 30-59 ml/min (H)    Stricture or kinking of ureter    Bicornate uterus    Hydronephrosis of kidney transplant    Orthostatic hypotension    Tertiary hyperparathyroidism (H)    High-risk pregnancy in first trimester       Social History   Social History     Tobacco Use    Smoking status: Never    Smokeless tobacco: Never   Vaping Use    Vaping status: Never Used   Substance Use Topics    Alcohol use: No     Alcohol/week: 0.0 standard drinks of alcohol    Drug use: No       Allergies   Allergies   Allergen Reactions    Contrast Dye Rash     CT contrast allergy 12/14/19 rash over eyes. Need to have pre medication before a CT WITH CONTRAST     Diatrizoate Rash     CT contrast allergy 12/14/19 rash over eyes. Need to have pre medication before a CT WITH CONTRAST     Lisinopril Swelling     angioedema    Nitrous Oxide Other (See Comments)     Sense of doom    Chlorhexidine Rash     Rash at site    Sulfa Drugs Rash     Muscle stiffness of neck    Dapsone      Methemoglobinemia    Furosemide Other (See Comments)     Skin flushing    Metrogel [Metronidazole]      Hives diffusely on body after vaginal administration.    Azithromycin Dizziness and Rash    Cefuroxime Rash       Medications   Current Outpatient Medications   Medication Sig    acetaminophen (TYLENOL) 325 MG tablet Take 2 tablets (650 mg) by mouth every 4 hours as needed for mild pain    aspirin (ASA) 81 MG chewable tablet Take 1 tablet (81 mg) by mouth daily (Patient taking differently: Take 81 mg by mouth every evening)    azaTHIOprine (IMURAN) 50 MG tablet Take 3 tablets (150 mg) by mouth daily (Patient taking differently: Take 150 mg by mouth every evening)    cinacalcet (SENSIPAR) 30 MG tablet Take 1 tablet (30 mg) by mouth every other day (Patient not taking: Reported on 2/28/2023)    cyanocobalamin (VITAMIN B-12) 1000 MCG tablet Take 1 tablet (1,000 mcg) by mouth daily    diphenhydrAMINE  (BENADRYL) 25 MG capsule Take 1 capsule (25 mg) by mouth daily as needed for itching or allergies Take 1-2 tablets by mouth every 6 hours PRN itching/allergies (Patient not taking: Reported on 2/28/2023)    EPINEPHrine (ANY BX GENERIC EQUIV) 0.3 MG/0.3ML injection 2-pack     famotidine (PEPCID) 20 MG tablet Take 1 tablet (20 mg) by mouth 2 times daily (Patient taking differently: Take 20 mg by mouth as needed)    ferrous sulfate (FEROSUL) 325 (65 Fe) MG tablet Take 1 tablet (325 mg) by mouth daily (with breakfast)    fluticasone (FLONASE) 50 MCG/ACT nasal spray Spray 1 spray into both nostrils daily (Patient not taking: Reported on 3/15/2023)    hydrOXYzine (ATARAX) 10 MG tablet Take 1 tablet (10 mg) by mouth 3 times daily as needed for anxiety (Patient not taking: Reported on 3/31/2023)    levothyroxine (SYNTHROID/LEVOTHROID) 25 MCG tablet TAKE 1.5 tablet daily 6 days per week and 1 tablet daily on the 7th day ( will be titrating up) (Patient taking differently: Take 25 mcg by mouth every morning TAKE 1.5 tablet daily 6 days per week and 1 tablet daily on the 7th day ( will be titrating up))    polyethylene glycol (MIRALAX) 17 GM/Dose powder Take 17 g (1 capful) by mouth daily as needed for constipation    Prenatal Vit-Fe Fumarate-FA (PRENATAL MULTIVITAMIN W/IRON) 27-0.8 MG tablet Take 1 tablet by mouth daily (Patient taking differently: Take 1 tablet by mouth every evening)    Prenatal Vit-Fe Fumarate-FA (PRENATAL VITAMINS) 28-0.8 MG TABS Take 1 tablet by mouth daily (Patient taking differently: Take 1 tablet by mouth daily)    simethicone (MYLICON) 125 MG chewable tablet Take 1 tablet (125 mg) by mouth 4 times daily as needed for intestinal gas    tacrolimus (GENERIC EQUIVALENT) 0.5 MG capsule Tacrolimus 0.5mg - take 1 capsule by mouth twice daily, with 1mg capsules for a total dose of 3.5 twice daily. (Patient taking differently: Take 0.5 mg by mouth 2 times daily Tacrolimus 0.5mg - take 1 capsule by mouth twice  daily, with 1mg capsules for a total dose of 3.5 twice daily.)    tacrolimus (GENERIC EQUIVALENT) 1 MG capsule Tacrolimus 1 mg - take 3 capsules by mouth twice a day, with 0.5mg capsules for total dose of 3.5mg twice daily (Patient taking differently: Take 3 mg by mouth 2 times daily Tacrolimus 1 mg - take 3 capsules by mouth twice a day, with 0.5mg capsules for total dose of 3.5mg twice daily)    vitamin C (ASCORBIC ACID) 250 MG tablet Take 1 tablet (250 mg) by mouth daily     No current facility-administered medications for this visit.     Medications Discontinued During This Encounter   Medication Reason    cinacalcet (SENSIPAR) 30 MG tablet     diphenhydrAMINE (BENADRYL) 25 MG capsule     fluticasone (FLONASE) 50 MCG/ACT nasal spray     hydrOXYzine (ATARAX) 10 MG tablet     Prenatal Vit-Fe Fumarate-FA (PRENATAL VITAMINS) 28-0.8 MG TABS         Physical Exam    Blood pressure 110/68, pulse 89, weight 79.8 kg (176 lb), last menstrual period 12/16/2022, SpO2 98 %, not currently breastfeeding.   GENERAL APPEARANCE: alert and no distress  HENT: mouth without ulcers or lesions  LYMPHATICS: no cervical or supraclavicular nodes  RESP: lungs clear to auscultation - no rales, rhonchi or wheezes  CV: regular rhythm, normal rate, no rub, no murmur  EDEMA: no LE edema bilaterally  ABDOMEN: soft, nondistended, nontender, bowel sounds normal  MS: extremities normal - no gross deformities noted, no evidence of inflammation in joints, no muscle tenderness  SKIN: no rash  NEURO: normal strength and tone, sensory exam grossly normal, mentation intact and speech normal  PSYCH: mentation appears normal and affect normal/bright  TX KIDNEY: normal      Data         Latest Ref Rng & Units 4/10/2023    10:28 AM 3/27/2023    10:19 AM 3/15/2023     9:52 AM   Renal   Sodium 136 - 145 mmol/L 136   135   137     K 3.4 - 5.3 mmol/L 4.1   4.6   4.0     Cl 98 - 107 mmol/L 105   105   105     Cl (external) 98 - 107 mmol/L 105   105   105     CO2  22 - 29 mmol/L 22   21   23     Urea Nitrogen 6.0 - 20.0 mg/dL 18.0   18.3   15.4     Creatinine 0.51 - 0.95 mg/dL 0.92   0.94   0.83     Glucose 70 - 99 mg/dL 91   91   126     Calcium 8.6 - 10.0 mg/dL 10.1   10.6   11.3           Latest Ref Rng & Units 2/6/2023    10:23 AM 1/18/2023     9:49 AM 7/13/2022    12:20 PM   Bone Health   Parathyroid Hormone Intact 15 - 65 pg/mL 59   79      Vit D Def 20 - 75 ug/L   14           Latest Ref Rng & Units 4/10/2023    10:28 AM 3/27/2023    10:19 AM 3/15/2023     9:52 AM   Heme   WBC 4.0 - 11.0 10e3/uL 9.2   9.3   8.9     Hgb 11.7 - 15.7 g/dL 10.1   11.4   11.7     Plt 150 - 450 10e3/uL 199   202   205           Latest Ref Rng & Units 6/22/2022     6:39 AM 8/4/2020     9:05 AM 2/3/2020    10:29 AM   Liver   AP 45 - 120 U/L 110   185   126     TBili 0.0 - 1.0 mg/dL 0.9   0.7   0.6     Bilirubin Direct 0.0 - 0.2 mg/dL  <0.1   0.1     ALT 0 - 45 U/L 10   19   20     AST 0 - 40 U/L 16   11   13     Tot Protein 6.0 - 8.0 g/dL 8.0   7.9   7.4     Albumin 3.5 - 5.0 g/dL 4.4   4.0   3.7           Latest Ref Rng & Units 2/6/2023    10:23 AM 6/22/2022     6:39 AM 8/4/2020     9:05 AM   Pancreas   A1C <5.7 % 5.2    5.5     Lipase 0 - 52 U/L  39            Latest Ref Rng & Units 7/13/2022    12:20 PM 8/4/2020     9:05 AM 2/17/2020     8:50 AM   Iron studies   Iron 35 - 180 ug/dL 51   64   95     Iron Saturation Index 15 - 46 % 24   32   42     Ferritin 12 - 150 ng/mL 1,111   1,065   1,139           Latest Ref Rng & Units 7/7/2021     9:20 AM 6/2/2021     9:25 AM 5/3/2021     9:02 AM   [facility] Txp Virology   BK Spec  Plasma   Plasma   Plasma     BK Res BKNEG^BK Virus DNA Not Detected copies/mL BK Virus DNA Not Detected   BK Virus DNA Not Detected   BK Virus DNA Not Detected     BK Log <2.7 Log copies/mL Not Calculated   Not Calculated   Not Calculated          Recent Labs   Lab Test 03/15/23  0952 03/27/23  1019 04/10/23  1028   DOSTAC 3/14/2023 3/26/2023 4/9/2023   TACROL 4.0* 4.0* 4.3*      Recent Labs   Lab Test 08/16/19  0807 09/03/19  0944   DOSMPA Not Provided 0840pm   MPACID 1.92 3.39   MPAG 149.2* 52.8

## 2023-04-14 ENCOUNTER — TELEPHONE (OUTPATIENT)
Dept: ENDOCRINOLOGY | Facility: CLINIC | Age: 33
End: 2023-04-14

## 2023-04-14 ENCOUNTER — TELEPHONE (OUTPATIENT)
Dept: TRANSPLANT | Facility: CLINIC | Age: 33
End: 2023-04-14

## 2023-04-14 ENCOUNTER — VIRTUAL VISIT (OUTPATIENT)
Dept: ENDOCRINOLOGY | Facility: CLINIC | Age: 33
End: 2023-04-14
Payer: MEDICARE

## 2023-04-14 DIAGNOSIS — Z94.0 KIDNEY REPLACED BY TRANSPLANT: ICD-10-CM

## 2023-04-14 DIAGNOSIS — Z48.298 AFTERCARE FOLLOWING ORGAN TRANSPLANT: Primary | ICD-10-CM

## 2023-04-14 DIAGNOSIS — D64.9 ANEMIA, UNSPECIFIED TYPE: ICD-10-CM

## 2023-04-14 DIAGNOSIS — E58 CALCIUM DEFICIENCY: Primary | ICD-10-CM

## 2023-04-14 PROBLEM — N25.81 SECONDARY RENAL HYPERPARATHYROIDISM (H): Status: RESOLVED | Noted: 2019-08-13 | Resolved: 2023-04-14

## 2023-04-14 PROBLEM — N13.30 HYDRONEPHROSIS OF KIDNEY TRANSPLANT: Status: RESOLVED | Noted: 2020-08-15 | Resolved: 2023-04-14

## 2023-04-14 PROBLEM — T86.19 HYDRONEPHROSIS OF KIDNEY TRANSPLANT: Status: RESOLVED | Noted: 2020-08-15 | Resolved: 2023-04-14

## 2023-04-14 PROBLEM — O09.92 HIGH-RISK PREGNANCY IN SECOND TRIMESTER: Status: ACTIVE | Noted: 2023-02-14

## 2023-04-14 PROBLEM — N13.5 STRICTURE OR KINKING OF URETER: Status: RESOLVED | Noted: 2019-11-22 | Resolved: 2023-04-14

## 2023-04-14 PROCEDURE — 99214 OFFICE O/P EST MOD 30 MIN: CPT | Mod: VID | Performed by: INTERNAL MEDICINE

## 2023-04-14 RX ORDER — CALCIUM CARBONATE 1250 MG/5ML
1250 SUSPENSION ORAL 2 TIMES DAILY
Qty: 500 ML | Refills: 1 | Status: SHIPPED | OUTPATIENT
Start: 2023-04-14 | End: 2023-05-03

## 2023-04-14 NOTE — PROGRESS NOTES
"Video-Visit Details    Type of service:  Video Visit    Virtual visit conducted by Naya.      Originating Location (pt. Location): HOME    Distant Location (provider location):  Off site    Mode of Communication:  Video Conference via [facility]    Physician has received verbal consent for a Video Visit from the patient? YES      Start time 440.  Stop time 5:00.  Chart review, documentation time, coordination of care, 10 minutes.  Total time spent day of encounter 30 minutes.    Katrin Lopez MD  4/14/23    HPI  #1 history of hypothyroidism  Report, she had a history of hypothyroidism that was diagnosed when she had \"no from her nipples\" with associated high prolactin and presumably, high TSH.  She was then placed on levothyroxine and her nipple discharge and prolactin subsequently improved. Prior to pregnancy, the patient had been on levothyroxine 25 mcg daily 4 days a week, and 37.5 mcg daily 3 days/week.    Interval history:   Patient currently on levothyroxine to 37 mcg daily 6 days a week, and 25 mcg daily 1 day/week.  March 2023 TSH 0.77.  Pending recheck labs next week.     #2 current pregnancy-17 weeks.  Reviewing her chart, this has been planned for many months, with the multidisciplinary team involved.  She reports oral contraceptive use from roughly 3911-6917.  Her oral contraceptive use was stopped in 2022 and she reports regular menses.  She is being followed by nephrology, and maternal-fetal medicine.      Interval history: Patient currently 17 weeks.  Has gained about 11 pounds.  Pending surgery for hyperparathyroidism as noted below.     #3 history of prediabetes-February 2023 hemoglobin A1c at 5.2  The patient reports history of prediabetes.  Reviewing the patient's chart, August 2019 hemoglobin A1c was 4.8, February 2020 hemoglobin A1c 5.7, in April 2020 hemoglobin A1c was 5.5.    Interval history: February 2023 hemoglobin A1c of 5.2.    #4 history of renal transplant in August 2019, now " with likely tertiary hyperparathyroidism  The patient has a history of end-stage renal disease, due to suspected IgA nephropathy for which she was previously on dialysis and underwent recent on transplant in August 2019.     #5 history of high prolactin  Per chart, there is a history of prolactin at 43 in April 2016, 38 in February 2017, 38 in March 2018, 51 in June 2019, 9 in November 2019, 14 and April 2020, 14 in June 2021, 20.9 November 2021, and 17 in July 2022.  2017 had MRI did not clearly show a macroadenoma, although microadenoma cannot be excluded.  No contrast was used.    Interval history: This has normalized with treatment of TFTs.    #6 history of hypercalcemia, now with likely tertiary hyperparathyroidism, pending surgery in April 2023  This is thought to due to renal related secondary hyperparathyroidism.  Had been on Sensipar, however this was stopped as she was trying to become pregnant.  Nephrology felt no need to intervene on serum calcium greater than 11.  January 2023 PTH is 79, serum calcium was 10.8    Interval history: Potentially, the consideration was for Cinacalcet use with the hypercalcemia (calcium as high as 11.2).  However the patient is concerned about the effect on pregnancy.  As she is in the second trimester, she has met with ENT and is pending surgery for tertiary hyperparathyroidism at the end of April.  March 2023 ultrasound did not identify any thyroid nodules but did show 0.9 cm nodule deep to the left thyroid lobe suggestive of parathyroid adenoma.    Past Medical History  Past Medical History:   Diagnosis Date     Anemia in chronic kidney disease      History of bacterial endocarditis      History of blood transfusion      History of DVT (deep vein thrombosis) 2014     History of seizure 2014     History of subarachnoid hemorrhage      Hydronephrosis      Hypothyroidism      Kidney transplanted 08/03/2019    DCD DDKT. Induction with thymo 6mg/kg.     Orthostatic hypotension       FATOUMATA (obstructive sleep apnea)      Pyelonephritis of transplanted kidney 01/23/2020     Secondary renal hyperparathyroidism (H)      Urinary tract infection        Allergies  Allergies   Allergen Reactions     Contrast Dye Rash     CT contrast allergy 12/14/19 rash over eyes. Need to have pre medication before a CT WITH CONTRAST      Diatrizoate Rash     CT contrast allergy 12/14/19 rash over eyes. Need to have pre medication before a CT WITH CONTRAST      Lisinopril Swelling     angioedema     Nitrous Oxide Other (See Comments)     Sense of doom     Chlorhexidine Rash     Rash at site     Sulfa Drugs Rash     Muscle stiffness of neck     Dapsone      Methemoglobinemia     Furosemide Other (See Comments)     Skin flushing     Metrogel [Metronidazole]      Hives diffusely on body after vaginal administration.     Azithromycin Dizziness and Rash     Cefuroxime Rash     Medications  Current Outpatient Medications   Medication Sig Dispense Refill     acetaminophen (TYLENOL) 325 MG tablet Take 2 tablets (650 mg) by mouth every 4 hours as needed for mild pain 100 tablet 3     aspirin (ASA) 81 MG chewable tablet Take 1 tablet (81 mg) by mouth daily (Patient taking differently: Take 81 mg by mouth every evening) 90 tablet 1     azaTHIOprine (IMURAN) 50 MG tablet Take 3 tablets (150 mg) by mouth daily (Patient taking differently: Take 150 mg by mouth every evening) 270 tablet 3     cyanocobalamin (VITAMIN B-12) 1000 MCG tablet Take 1 tablet (1,000 mcg) by mouth daily 90 tablet 1     EPINEPHrine (ANY BX GENERIC EQUIV) 0.3 MG/0.3ML injection 2-pack        famotidine (PEPCID) 20 MG tablet Take 1 tablet (20 mg) by mouth 2 times daily (Patient taking differently: Take 20 mg by mouth as needed) 180 tablet 3     ferrous sulfate (FEROSUL) 325 (65 Fe) MG tablet Take 1 tablet (325 mg) by mouth daily (with breakfast) 90 tablet 1     fluticasone (FLONASE) 50 MCG/ACT nasal spray Spray 1 spray into both nostrils daily as needed for  rhinitis or allergies 16 g 1     levothyroxine (SYNTHROID/LEVOTHROID) 25 MCG tablet TAKE 1.5 tablet daily 6 days per week and 1 tablet daily on the 7th day ( will be titrating up) (Patient taking differently: Take 25 mcg by mouth every morning TAKE 1.5 tablet daily 6 days per week and 1 tablet daily on the 7th day ( will be titrating up)) 150 tablet 3     polyethylene glycol (MIRALAX) 17 GM/Dose powder Take 17 g (1 capful) by mouth daily as needed for constipation 507 g 3     Prenatal Vit-Fe Fumarate-FA (PRENATAL MULTIVITAMIN W/IRON) 27-0.8 MG tablet Take 1 tablet by mouth daily (Patient taking differently: Take 1 tablet by mouth every evening) 90 tablet 3     simethicone (MYLICON) 125 MG chewable tablet Take 1 tablet (125 mg) by mouth 4 times daily as needed for intestinal gas 120 tablet 3     tacrolimus (GENERIC EQUIVALENT) 0.5 MG capsule Tacrolimus 0.5mg - take 1 capsule by mouth twice daily, with 1mg capsules for a total dose of 3.5 twice daily. (Patient taking differently: Take 0.5 mg by mouth 2 times daily Tacrolimus 0.5mg - take 1 capsule by mouth twice daily, with 1mg capsules for a total dose of 3.5 twice daily.) 60 capsule 11     tacrolimus (GENERIC EQUIVALENT) 1 MG capsule Tacrolimus 1 mg - take 3 capsules by mouth twice a day, with 0.5mg capsules for total dose of 3.5mg twice daily (Patient taking differently: Take 3 mg by mouth 2 times daily Tacrolimus 1 mg - take 3 capsules by mouth twice a day, with 0.5mg capsules for total dose of 3.5mg twice daily) 180 capsule 11     vitamin C (ASCORBIC ACID) 250 MG tablet Take 1 tablet (250 mg) by mouth daily 90 tablet 1     Family History  family history includes Cerebrovascular Disease in her father and sister; Coronary Artery Disease in her father; Diabetes in her sister; Heart Disease in her father; Hypertension in her sister; Kidney Disease in her mother and sister; Kidney failure in her mother; Sleep Apnea in her father.  Social History  Social History  "    Socioeconomic History     Marital status: Single     Spouse name: Jt Aleman (culturally )     Number of children: 0     Years of education: Not on file     Highest education level: Not on file   Occupational History     Occupation: Pharmacy Technician   Tobacco Use     Smoking status: Never     Smokeless tobacco: Never   Vaping Use     Vaping status: Never Used   Substance and Sexual Activity     Alcohol use: No     Alcohol/week: 0.0 standard drinks of alcohol     Drug use: No     Sexual activity: Yes     Partners: Male   Other Topics Concern     Parent/sibling w/ CABG, MI or angioplasty before 65F 55M? Not Asked   Social History Narrative    Date of Service:5/15/2013     Name: Mona VALDOVINOS (Month, Day, Year of birth): 1990     MRN: [id number]     New Patient: Yes    Preferred Language: English     Needed: No    County of Residence: [place]    Marital Status:     Household size: 8    Number of Dependents:0     Pregnant: 0    Average Monthly Income: $ 600    Citizenship Status: Citizen    Gave Pt [employer] and [employer] applications     Social Determinants of Health     Financial Resource Strain: Not on file   Food Insecurity: Not on file   Transportation Needs: Not on file   Physical Activity: Not on file   Stress: Not on file   Social Connections: Not on file   Intimate Partner Violence: Not on file   Housing Stability: Not on file       ROS  Per HPI    Physical Exam  GENERAL: Healthy, alert and no distress\",\"EYES: Eyes grossly normal to inspection.  No discharge or erythema, or obvious scleral/conjunctival abnormalities.\",\"RESP: No audible wheeze, cough, or visible cyanosis.  No visible retractions or increased work of breathing.  \",\"SKIN: Visible skin clear. No significant rash, abnormal pigmentation or lesions.\",\"NEURO: Cranial nerves grossly intact.  Mentation and speech appropriate for age.\",\"PSYCH: Mentation appears normal, affect normal/bright, judgement and insight intact, " normal speech and appearance well-groomed.    RESULTS  Office Visit on 04/12/2023   Component Date Value Ref Range Status     Total Protein Urine mg/dL 04/12/2023 7.1  1.0 - 14.0 mg/dL Final    The reference ranges have not been established in urine protein. The results should be integrated into the clinical context for interpretation.     Total Protein UR MG/MG CR 04/12/2023 0.06  0.00 - 0.20 mg/mg Cr Final     Creatinine Urine mg/dL 04/12/2023 110.2  mg/dL Final    The reference ranges have not been established in urine creatinine. The results should be integrated into the clinical context for interpretation.   Lab on 04/10/2023   Component Date Value Ref Range Status     Sodium 04/10/2023 136  136 - 145 mmol/L Final     Potassium 04/10/2023 4.1  3.4 - 5.3 mmol/L Final     Chloride 04/10/2023 105  98 - 107 mmol/L Final     Carbon Dioxide (CO2) 04/10/2023 22  22 - 29 mmol/L Final     Anion Gap 04/10/2023 9  7 - 15 mmol/L Final     Urea Nitrogen 04/10/2023 18.0  6.0 - 20.0 mg/dL Final     Creatinine 04/10/2023 0.92  0.51 - 0.95 mg/dL Final     Calcium 04/10/2023 10.1 (H)  8.6 - 10.0 mg/dL Final     Glucose 04/10/2023 91  70 - 99 mg/dL Final     GFR Estimate 04/10/2023 85  >60 mL/min/1.73m2 Final    eGFR calculated using 2021 CKD-EPI equation.     WBC Count 04/10/2023 9.2  4.0 - 11.0 10e3/uL Final     RBC Count 04/10/2023 3.22 (L)  3.80 - 5.20 10e6/uL Final     Hemoglobin 04/10/2023 10.1 (L)  11.7 - 15.7 g/dL Final     Hematocrit 04/10/2023 30.2 (L)  35.0 - 47.0 % Final     MCV 04/10/2023 94  78 - 100 fL Final     MCH 04/10/2023 31.4  26.5 - 33.0 pg Final     MCHC 04/10/2023 33.4  31.5 - 36.5 g/dL Final     RDW 04/10/2023 13.4  10.0 - 15.0 % Final     Platelet Count 04/10/2023 199  150 - 450 10e3/uL Final     Tacrolimus by Tandem Mass Spectrom* 04/10/2023 4.3 (L)  5.0 - 15.0 ug/L Final    Comment: Tacrolimus Reference Range (ug/L):    Kidney Transplant:  Pediatric  0-3 months post transplant: 10-12  3-6 months post  transplant: 8-10  6-12 months post transplant: 6-8  >12 months post transplant: 4-7    Adult  0-6 months post transplant: 8-10  6-12 months post transplant: 6-8  >12 months post transplant: 4-6  >5 years post transplant: 3-5    Heart Transplant:  Pediatric  0-12 months post transplant: 10-15  >12 months post transplant: 5-10    Adult  0-3 months post transplant: 10-15  3-6 months post transplant: 8-12  6-12 months post transplant: 6-12  >12 months post transplant: 6-10    Lung Transplant:  0-12 months post transplant: 10-15  >12 months post transplant: 8-12    Liver Transplant:  Pediatric  0-3 months post transplant: 10-15  3-6 months post transplant: 8-10  6 months-5 years post transplant: 6-8   >5 years post transplant: 1-3    Adult  0-3 months post transplant: 10-12  3-6 months post transplant: 8-10  >6 months post transplant: 6-8    Pancreas Transplant:  0-6                            months post transplant: 8-10  >6 months post transplant: 5-8     Tacrolimus Last Dose Date 04/10/2023 4/9/2023   Final     Tacrolimus Last Dose Time 04/10/2023 10:00 PM   Final   Office Visit on 03/29/2023   Component Date Value Ref Range Status     Trichomonas 03/29/2023 Absent  Absent Final     Yeast 03/29/2023 Absent  Absent Final     Clue Cells 03/29/2023 Absent  Absent Final     WBCs/high power field 03/29/2023 1+ (A)  None Final     Color Urine 03/29/2023 Light Yellow  Colorless, Straw, Light Yellow, Yellow Final     Appearance Urine 03/29/2023 Clear  Clear Final     Glucose Urine 03/29/2023 Negative  Negative mg/dL Final     Bilirubin Urine 03/29/2023 Negative  Negative Final     Ketones Urine 03/29/2023 Negative  Negative mg/dL Final     Specific Gravity Urine 03/29/2023 1.010  1.003 - 1.035 Final     Blood Urine 03/29/2023 Negative  Negative Final     pH Urine 03/29/2023 6.0  5.0 - 7.0 Final     Protein Albumin Urine 03/29/2023 Negative  Negative mg/dL Final     Urobilinogen Urine 03/29/2023 Normal  Normal, 2.0 mg/dL Final      Nitrite Urine 03/29/2023 Negative  Negative Final     Leukocyte Esterase Urine 03/29/2023 Negative  Negative Final     Bacteria Urine 03/29/2023 Few (A)  None Seen /HPF Final     Mucus Urine 03/29/2023 Present (A)  None Seen /LPF Final     RBC Urine 03/29/2023 0  <=2 /HPF Final     WBC Urine 03/29/2023 1  <=5 /HPF Final     Squamous Epithelials Urine 03/29/2023 3 (H)  <=1 /HPF Final     Culture 03/29/2023 <10,000 CFU/mL Mixture of urogenital anita   Final   Lab on 03/27/2023   Component Date Value Ref Range Status     Sodium 03/27/2023 135 (L)  136 - 145 mmol/L Final     Potassium 03/27/2023 4.6  3.4 - 5.3 mmol/L Final     Chloride 03/27/2023 105  98 - 107 mmol/L Final     Carbon Dioxide (CO2) 03/27/2023 21 (L)  22 - 29 mmol/L Final     Anion Gap 03/27/2023 9  7 - 15 mmol/L Final     Urea Nitrogen 03/27/2023 18.3  6.0 - 20.0 mg/dL Final     Creatinine 03/27/2023 0.94  0.51 - 0.95 mg/dL Final     Calcium 03/27/2023 10.6 (H)  8.6 - 10.0 mg/dL Final     Glucose 03/27/2023 91  70 - 99 mg/dL Final     GFR Estimate 03/27/2023 83  >60 mL/min/1.73m2 Final    eGFR calculated using 2021 CKD-EPI equation.     WBC Count 03/27/2023 9.3  4.0 - 11.0 10e3/uL Final     RBC Count 03/27/2023 3.70 (L)  3.80 - 5.20 10e6/uL Final     Hemoglobin 03/27/2023 11.4 (L)  11.7 - 15.7 g/dL Final     Hematocrit 03/27/2023 34.5 (L)  35.0 - 47.0 % Final     MCV 03/27/2023 93  78 - 100 fL Final     MCH 03/27/2023 30.8  26.5 - 33.0 pg Final     MCHC 03/27/2023 33.0  31.5 - 36.5 g/dL Final     RDW 03/27/2023 13.3  10.0 - 15.0 % Final     Platelet Count 03/27/2023 202  150 - 450 10e3/uL Final     Total Protein Urine mg/dL 03/27/2023 5.5  1.0 - 14.0 mg/dL Final    The reference ranges have not been established in urine protein. The results should be integrated into the clinical context for interpretation.     Total Protein UR MG/MG CR 03/27/2023 0.06  0.00 - 0.20 mg/mg Cr Final     Creatinine Urine mg/dL 03/27/2023 93.0  mg/dL Final    The reference  ranges have not been established in urine creatinine. The results should be integrated into the clinical context for interpretation.     Tacrolimus by Tandem Mass Spectrom* 03/27/2023 4.0 (L)  5.0 - 15.0 ug/L Final    Comment: Tacrolimus Reference Range (ug/L):    Kidney Transplant:  Pediatric  0-3 months post transplant: 10-12  3-6 months post transplant: 8-10  6-12 months post transplant: 6-8  >12 months post transplant: 4-7    Adult  0-6 months post transplant: 8-10  6-12 months post transplant: 6-8  >12 months post transplant: 4-6  >5 years post transplant: 3-5    Heart Transplant:  Pediatric  0-12 months post transplant: 10-15  >12 months post transplant: 5-10    Adult  0-3 months post transplant: 10-15  3-6 months post transplant: 8-12  6-12 months post transplant: 6-12  >12 months post transplant: 6-10    Lung Transplant:  0-12 months post transplant: 10-15  >12 months post transplant: 8-12    Liver Transplant:  Pediatric  0-3 months post transplant: 10-15  3-6 months post transplant: 8-10  6 months-5 years post transplant: 6-8   >5 years post transplant: 1-3    Adult  0-3 months post transplant: 10-12  3-6 months post transplant: 8-10  >6 months post transplant: 6-8    Pancreas Transplant:  0-6                            months post transplant: 8-10  >6 months post transplant: 5-8     Tacrolimus Last Dose Date 03/27/2023 3/26/2023   Final     Tacrolimus Last Dose Time 03/27/2023 10:00 PM   Final   Lab on 03/22/2023   Component Date Value Ref Range Status     TSH 03/22/2023 0.77  0.30 - 4.20 uIU/mL Final     T3 Free 03/22/2023 3.3  2.0 - 4.4 pg/mL Final     Free T4 03/22/2023 1.14  0.90 - 1.70 ng/dL Final   Ancillary Procedure on 03/20/2023   Component Date Value Ref Range Status     LVEF  03/20/2023 70%   Final   Office Visit on 03/15/2023   Component Date Value Ref Range Status     Hepatitis B Surface Antibody Instr* 03/15/2023 30.05  <8.00 m[IU]/mL Final     Hepatitis B Surface Antibody 03/15/2023 Reactive    Final    Patient is considered to be immune to infection with hepatitis B when the value is greater than or equal to 12.00 mIU/mL.     Interpretation 03/15/2023 Negative for Intraepithelial Lesion or Malignancy (NILM)    Final     Comment 03/15/2023    Final                    Value:This result contains rich text formatting which cannot be displayed here.     Specimen Adequacy 03/15/2023 Satisfactory for evaluation, endocervical/transformation zone component absent   Final     Clinical Information 03/15/2023    Final                    Value:This result contains rich text formatting which cannot be displayed here.     Reflex Testing 03/15/2023 Yes regardless of result   Final     Previous Abnormal? 03/15/2023    Final                    Value:This result contains rich text formatting which cannot be displayed here.     Performing Labs 03/15/2023    Final                    Value:This result contains rich text formatting which cannot be displayed here.     INR 03/22/2023 1.02  0.85 - 1.15 Final     Thrombin Time 03/22/2023 15.1  13.0 - 19.0 Seconds Final     PTT Ratio 03/22/2023 1.24 (H)  <1.21 Final     Platelet Neutralization 03/22/2023 -3  <=0 Seconds Final     PTT 1:2 MIX 03/22/2023 41  31 - 45 Seconds Final     DRVVT Screen Ratio 03/22/2023 0.80  <1.08 Final     Lupus Result 03/22/2023 Negative  Negative Final     Lupus Interpretation 03/22/2023    Final                    Value:INR is normal.  APTT ratio is elevated.  Platelet Neutralization is negative.  APTT 1:2 Mix is normal.  DRVVT Screen ratio is normal.  Thrombin time is normal.  NEGATIVE TEST; A LUPUS ANTICOAGULANT WAS NOT DETECTED IN THIS SPECIMEN WITHIN THE LIMITS OF THE TESTING REPERTOIRE.  If the clinical picture is strongly suggestive of an antiphospholipid syndrome, recommend anticardiolipin and beta-2-glycoprotein (IgG and IgM) antibody tests.  Platelet Neutralization and APTT 1:2 Mix are suggestive of factor deficiency.   Recommend factors 8,  9, 11 and 12 (factors VIII, IX, XI, and XII) levels if clinically indicated.      Sachi Cole MD, PhD  [facility]           Cardiolipin Noemy IgG Instrument Erin* 03/15/2023 <2.0  <10.0 GPL-U/mL Final     Cardiolipin Antibody IgG 03/15/2023 Negative  Negative Final     Cardiolipin Noemy IgM Instrument Erin* 03/15/2023 13.0 (H)  <10.0 MPL-U/mL Final     Cardiolipin Antibody IgM 03/15/2023 Weak Positive (A)  Negative Final    Weak positive. Recommend retesting patient after 8-12 weeks.     Beta 2 Glycoprotein 1 Antibody IgM 03/15/2023 <2.4  <7.0 U/mL Final    Negative     Beta 2 Glycoprotein 1 Antibody IgG 03/15/2023 1.3  <7.0 U/mL Final    Negative     Protein S Antigen Free 03/22/2023 63  55 - 125 % Final     Protein C Chromogenic 03/22/2023 115  70 - 170 % Final     Antithrombin III 03/15/2023 91  85 - 135 % Final     Hepatitis C Antibody 03/15/2023 Nonreactive  Nonreactive Final     Culture 03/15/2023 No Growth   Final     VZV Noemy IgG Instrument Value 03/15/2023 59.8  <135.0 Index Final     Varicella Zoster Antibody IgG 03/15/2023 No detectable antibody.   Final     Rubella Noemy IgG Instrument Value 03/15/2023 0.44  <0.90 Index Final     Rubella Antibody IgG 03/15/2023 No detectable antibody.   Final     Neisseria gonorrhoeae 03/15/2023 Negative  Negative Final    Negative for N. gonorrhoeae rRNA by transcription mediated amplification. A negative result by transcription mediated amplification does not preclude the presence of C. trachomatis infection because results are dependent on proper and adequate collection, absence of inhibitors and sufficient rRNA to be detected.     Hepatitis B Surface Antigen 03/15/2023 Nonreactive  Nonreactive Final     HIV Antigen Antibody Combo 03/15/2023 Nonreactive  Nonreactive Final    HIV-1 p24 Ag & HIV-1/HIV-2 Ab Not Detected     Chlamydia trachomatis 03/15/2023 Negative  Negative Final    A negative result by transcription mediated amplification does not preclude the  presence of C. trachomatis infection because results are dependent on proper and adequate collection, absence of inhibitors and sufficient rRNA to be detected.     Treponema Antibody Total 03/15/2023 Nonreactive  Nonreactive Final     Color Urine 03/15/2023 Straw  Colorless, Straw, Light Yellow, Yellow Final     Appearance Urine 03/15/2023 Clear  Clear Final     Glucose Urine 03/15/2023 Negative  Negative mg/dL Final     Bilirubin Urine 03/15/2023 Negative  Negative Final     Ketones Urine 03/15/2023 Negative  Negative mg/dL Final     Specific Gravity Urine 03/15/2023 1.003  1.003 - 1.035 Final     Blood Urine 03/15/2023 Negative  Negative Final     pH Urine 03/15/2023 6.5  5.0 - 7.0 Final     Protein Albumin Urine 03/15/2023 Negative  Negative mg/dL Final     Urobilinogen Urine 03/15/2023 Normal  Normal, 2.0 mg/dL Final     Nitrite Urine 03/15/2023 Negative  Negative Final     Leukocyte Esterase Urine 03/15/2023 Negative  Negative Final     Bacteria Urine 03/15/2023 Few (A)  None Seen /HPF Final     RBC Urine 03/15/2023 1  <=2 /HPF Final     WBC Urine 03/15/2023 0  <=5 /HPF Final     Squamous Epithelials Urine 03/15/2023 1  <=1 /HPF Final     See Scanned Result 03/15/2023 INVITAE NON-INVASIVE PRENATAL SCREENING-Scanned   Final     Sodium 03/15/2023 137  136 - 145 mmol/L Final     Potassium 03/15/2023 4.0  3.4 - 5.3 mmol/L Final     Chloride 03/15/2023 105  98 - 107 mmol/L Final     Carbon Dioxide (CO2) 03/15/2023 23  22 - 29 mmol/L Final     Anion Gap 03/15/2023 9  7 - 15 mmol/L Final     Urea Nitrogen 03/15/2023 15.4  6.0 - 20.0 mg/dL Final     Creatinine 03/15/2023 0.83  0.51 - 0.95 mg/dL Final     Calcium 03/15/2023 11.3 (H)  8.6 - 10.0 mg/dL Final     Glucose 03/15/2023 126 (H)  70 - 99 mg/dL Final     GFR Estimate 03/15/2023 >90  >60 mL/min/1.73m2 Final    eGFR calculated using 2021 CKD-EPI equation.     WBC Count 03/15/2023 8.9  4.0 - 11.0 10e3/uL Final     RBC Count 03/15/2023 3.93  3.80 - 5.20 10e6/uL Final      Hemoglobin 03/15/2023 11.7  11.7 - 15.7 g/dL Final     Hematocrit 03/15/2023 36.9  35.0 - 47.0 % Final     MCV 03/15/2023 94  78 - 100 fL Final     MCH 03/15/2023 29.8  26.5 - 33.0 pg Final     MCHC 03/15/2023 31.7  31.5 - 36.5 g/dL Final     RDW 03/15/2023 13.4  10.0 - 15.0 % Final     Platelet Count 03/15/2023 205  150 - 450 10e3/uL Final     Total Protein Urine mg/dL 03/15/2023 4.0  1.0 - 14.0 mg/dL Final    The reference ranges have not been established in urine protein. The results should be integrated into the clinical context for interpretation.     Total Protein UR MG/MG CR 03/15/2023 0.25 (H)  0.00 - 0.20 mg/mg Cr Final    Unable to calculate, urine creatinine or protein is outside the detectable limits.     Creatinine Urine mg/dL 03/15/2023 16.0  mg/dL Final    The reference ranges have not been established in urine creatinine. The results should be integrated into the clinical context for interpretation.     Tacrolimus by Tandem Mass Spectrom* 03/15/2023 4.0 (L)  5.0 - 15.0 ug/L Final    Comment: Tacrolimus Reference Range (ug/L):    Kidney Transplant:  Pediatric  0-3 months post transplant: 10-12  3-6 months post transplant: 8-10  6-12 months post transplant: 6-8  >12 months post transplant: 4-7    Adult  0-6 months post transplant: 8-10  6-12 months post transplant: 6-8  >12 months post transplant: 4-6  >5 years post transplant: 3-5    Heart Transplant:  Pediatric  0-12 months post transplant: 10-15  >12 months post transplant: 5-10    Adult  0-3 months post transplant: 10-15  3-6 months post transplant: 8-12  6-12 months post transplant: 6-12  >12 months post transplant: 6-10    Lung Transplant:  0-12 months post transplant: 10-15  >12 months post transplant: 8-12    Liver Transplant:  Pediatric  0-3 months post transplant: 10-15  3-6 months post transplant: 8-10  6 months-5 years post transplant: 6-8   >5 years post transplant: 1-3    Adult  0-3 months post transplant: 10-12  3-6 months post  transplant: 8-10  >6 months post transplant: 6-8    Pancreas Transplant:  0-6                            months post transplant: 8-10  >6 months post transplant: 5-8     Tacrolimus Last Dose Date 03/15/2023 3/14/2023   Final     Tacrolimus Last Dose Time 03/15/2023 10:00 PM   Final     Chlamydia Trachomatis 03/15/2023 Negative  Negative Final    Negative for C. trachomatis rRNA by transcription mediated amplification.   A negative result by transcription mediated amplification does not preclude the presence of infection because results are dependent on proper and adequate collection, absence of inhibitors and sufficient rRNA to be detected.     Neisseria gonorrhoeae 03/15/2023 Negative  Negative Final    Negative for N. gonorrhoeae rRNA by transcription mediated amplification. A negative result by transcription mediated amplification does not preclude the presence of C. trachomatis infection because results are dependent on proper and adequate collection, absence of inhibitors and sufficient rRNA to be detected.     ABO/RH(D) 03/15/2023 O POS   Final     Antibody Screen 03/15/2023 Negative  Negative Final     SPECIMEN EXPIRATION DATE 03/15/2023 [id number]   Final     Hold Specimen 03/15/2023 JIC   Final     Glu Gest Screen 1hr 50g 03/15/2023 126  70 - 129 mg/dL Final     Other HR HPV 03/15/2023 Negative  Negative Final     HPV16 DNA 03/15/2023 Negative  Negative Final     HPV18 DNA 03/15/2023 Negative  Negative Final     FINAL DIAGNOSIS 03/15/2023    Final                    Value:This result contains rich text formatting which cannot be displayed here.   Lab on 02/28/2023   Component Date Value Ref Range Status     TSH 02/28/2023 0.37  0.30 - 4.20 uIU/mL Final     Free T4 02/28/2023 1.28  0.90 - 1.70 ng/dL Final     T3 Total 02/28/2023 207 (H)  85 - 202 ng/dL Final     Sodium 02/28/2023 133 (L)  136 - 145 mmol/L Final     Potassium 02/28/2023 4.4  3.4 - 5.3 mmol/L Final     Chloride 02/28/2023 102  98 - 107  mmol/L Final     Carbon Dioxide (CO2) 02/28/2023 22  22 - 29 mmol/L Final     Anion Gap 02/28/2023 9  7 - 15 mmol/L Final     Urea Nitrogen 02/28/2023 17.7  6.0 - 20.0 mg/dL Final     Creatinine 02/28/2023 0.80  0.51 - 0.95 mg/dL Final     Calcium 02/28/2023 10.5 (H)  8.6 - 10.0 mg/dL Final     Glucose 02/28/2023 95  70 - 99 mg/dL Final     GFR Estimate 02/28/2023 >90  >60 mL/min/1.73m2 Final    eGFR calculated using 2021 CKD-EPI equation.     WBC Count 02/28/2023 9.8  4.0 - 11.0 10e3/uL Final     RBC Count 02/28/2023 4.02  3.80 - 5.20 10e6/uL Final     Hemoglobin 02/28/2023 12.2  11.7 - 15.7 g/dL Final     Hematocrit 02/28/2023 37.5  35.0 - 47.0 % Final     MCV 02/28/2023 93  78 - 100 fL Final     MCH 02/28/2023 30.3  26.5 - 33.0 pg Final     MCHC 02/28/2023 32.5  31.5 - 36.5 g/dL Final     RDW 02/28/2023 13.5  10.0 - 15.0 % Final     Platelet Count 02/28/2023 206  150 - 450 10e3/uL Final   Lab on 02/21/2023   Component Date Value Ref Range Status     Sodium 02/21/2023 134 (L)  136 - 145 mmol/L Final     Potassium 02/21/2023 4.3  3.4 - 5.3 mmol/L Final     Chloride 02/21/2023 102  98 - 107 mmol/L Final     Carbon Dioxide (CO2) 02/21/2023 22  22 - 29 mmol/L Final     Anion Gap 02/21/2023 10  7 - 15 mmol/L Final     Urea Nitrogen 02/21/2023 16.9  6.0 - 20.0 mg/dL Final     Creatinine 02/21/2023 0.81  0.51 - 0.95 mg/dL Final     Calcium 02/21/2023 11.2 (H)  8.6 - 10.0 mg/dL Final     Glucose 02/21/2023 93  70 - 99 mg/dL Final     GFR Estimate 02/21/2023 >90  >60 mL/min/1.73m2 Final    eGFR calculated using 2021 CKD-EPI equation.     WBC Count 02/21/2023 9.8  4.0 - 11.0 10e3/uL Final     RBC Count 02/21/2023 4.04  3.80 - 5.20 10e6/uL Final     Hemoglobin 02/21/2023 12.3  11.7 - 15.7 g/dL Final     Hematocrit 02/21/2023 37.7  35.0 - 47.0 % Final     MCV 02/21/2023 93  78 - 100 fL Final     MCH 02/21/2023 30.4  26.5 - 33.0 pg Final     MCHC 02/21/2023 32.6  31.5 - 36.5 g/dL Final     RDW 02/21/2023 13.3  10.0 - 15.0 %  Final     Platelet Count 02/21/2023 208  150 - 450 10e3/uL Final     Tacrolimus by Tandem Mass Spectrom* 02/21/2023 4.6 (L)  5.0 - 15.0 ug/L Final    Comment: Tacrolimus Reference Range (ug/L):    Kidney Transplant:  Pediatric  0-3 months post transplant: 10-12  3-6 months post transplant: 8-10  6-12 months post transplant: 6-8  >12 months post transplant: 4-7    Adult  0-6 months post transplant: 8-10  6-12 months post transplant: 6-8  >12 months post transplant: 4-6  >5 years post transplant: 3-5    Heart Transplant:  Pediatric  0-12 months post transplant: 10-15  >12 months post transplant: 5-10    Adult  0-3 months post transplant: 10-15  3-6 months post transplant: 8-12  6-12 months post transplant: 6-12  >12 months post transplant: 6-10    Lung Transplant:  0-12 months post transplant: 10-15  >12 months post transplant: 8-12    Liver Transplant:  Pediatric  0-3 months post transplant: 10-15  3-6 months post transplant: 8-10  6 months-5 years post transplant: 6-8   >5 years post transplant: 1-3    Adult  0-3 months post transplant: 10-12  3-6 months post transplant: 8-10  >6 months post transplant: 6-8    Pancreas Transplant:  0-6                            months post transplant: 8-10  >6 months post transplant: 5-8     Tacrolimus Last Dose Date 02/21/2023 2/20/2023   Final     Tacrolimus Last Dose Time 02/21/2023 10:00 PM   Final     Total Protein Urine mg/dL 02/21/2023 <6.0  1.0 - 14.0 mg/dL Final    The reference ranges have not been established in urine protein. The results should be integrated into the clinical context for interpretation.     Total Protein UR MG/MG CR 02/21/2023    Final    Unable to calculate, urine creatinine or protein is outside the detectable limits.     Creatinine Urine mg/dL 02/21/2023 42.4  mg/dL Final    The reference ranges have not been established in urine creatinine. The results should be integrated into the clinical context for interpretation.       EXAM: US  PARATHYROID  LOCATION: [facility]  DATE/TIME: 3/22/2023 11:48 AM     INDICATION:  Secondary renal hyperparathyroidism (H)  COMPARISON: None.  TECHNIQUE: Thyroid/parathyroid ultrasound.      FINDINGS:  RIGHT lobe: 4.6 x 1.5 x 1.6  cm. Homogeneous echotexture.  Isthmus: 3  mm.  LEFT lobe: 4.1 x 1.6 x 1.2  cm. Homogeneous echotexture.     NECK: No cervical lymphadenopathy.     NODULES: No thyroid nodules.     There is a 9 mm x 6 mm x 4 mm hypoechoic structure deep to the mid left thyroid lobe                                                                         IMPRESSION:  1.  Indeterminate 9 mm hypoechoic structure deep to the mid left thyroid lobe. A parathyroid gland and/or lesion is possible.     ASSESSMENT:    #1 history of hypothyroidism  March 2023 TSH 0.77.  I think she is on a reasonable program with her recent increase in levothyroxine to 37 mcg daily 6 days a week, and 25 mcg daily 1 day/week.  We will have patient continue with monthly TFTs.  She has a recheck TFTs next week.    #2 current pregnancy  Congratulated patient.       #3 history of prediabetes  February 2023 hemoglobin A1c of 5.3.  Currently pregnant.    #4 history of renal transplant in August 2019  Per nephrology.       #5 history of high prolactin  Patient currently pregnant.  It appears that her high prolactin may have been exacerbated by her hypothyroidism.  At any rate, her recent prolactin's have been fairly normal.  No need to repeat the head MRI at this time, although we could consider doing this after she has delivered.    #6 history of hypercalcemia, now with likely tertiary hyperparathyroidism, pending surgery in April 2023  Patient pending surgery due to progressive hypercalcemia from likely tertiary hyperparathyroidism.  Patient given prescription for liquid calcium.  She will let me know if this is not available.  I am interested in having the patient use liquid calcium at this will enable us to give her  high-dose calcium as needed to keep her calcium level stable.  We will plan for weekly calcium checks postsurgery.  We will have patient see me on May 5, 2023 for follow-up.  I will defer to surgery whether she should start calcitriol prior to surgery.

## 2023-04-14 NOTE — LETTER
"4/14/2023       RE: Mona Wanger  [address]  [place] [facility] [address]     Dear Colleague,    Thank you for referring your patient, Mona Wagner, to the [facility]OCR[facility]OLOG[facility] [place] at [facility]. Please see a copy of my visit note below.    Video-Visit Details    Type of service:  Video Visit    Virtual visit conducted by Naya.      Originating Location (pt. Location): HOME    Distant Location (provider location):  Off site    Mode of Communication:  Video Conference via Un[employer]    Physician has received verbal consent for a Video Visit from the patient? YES      Start time 440.  Stop time 5:00.  Chart review, documentation time, coordination of care, 10 minutes.  Total time spent day of encounter 30 minutes.    Katrin Lopez MD  4/14/23    HPI  #1 history of hypothyroidism  Report, she had a history of hypothyroidism that was diagnosed when she had \"no from her nipples\" with associated high prolactin and presumably, high TSH.  She was then placed on levothyroxine and her nipple discharge and prolactin subsequently improved. Prior to pregnancy, the patient had been on levothyroxine 25 mcg daily 4 days a week, and 37.5 mcg daily 3 days/week.    Interval history:   Patient currently on levothyroxine to 37 mcg daily 6 days a week, and 25 mcg daily 1 day/week.  March 2023 TSH 0.77.  Pending recheck labs next week.     #2 current pregnancy-17 weeks.  Reviewing her chart, this has been planned for many months, with the multidisciplinary team involved.  She reports oral contraceptive use from roughly [id number].  Her oral contraceptive use was stopped in 2022 and she reports regular menses.  She is being followed by nephrology, and maternal-fetal medicine.      Interval history: Patient currently 17 weeks.  Has gained about 11 pounds.  Pending surgery for hyperparathyroidism as noted below.     #3 history of prediabetes-February 2023 " hemoglobin A1c at 5.2  The patient reports history of prediabetes.  Reviewing the patient's chart, August 2019 hemoglobin A1c was 4.8, February 2020 hemoglobin A1c 5.7, in April 2020 hemoglobin A1c was 5.5.    Interval history: February 2023 hemoglobin A1c of 5.2.    #4 history of renal transplant in August 2019, now with likely tertiary hyperparathyroidism  The patient has a history of end-stage renal disease, due to suspected IgA nephropathy for which she was previously on dialysis and underwent recent on transplant in August 2019.     #5 history of high prolactin  Per chart, there is a history of prolactin at 43 in April 2016, 38 in February 2017, 38 in March 2018, 51 in June 2019, 9 in November 2019, 14 and April 2020, 14 in June 2021, 20.9 November 2021, and 17 in July 2022.  2017 had MRI did not clearly show a macroadenoma, although microadenoma cannot be excluded.  No contrast was used.    Interval history: This has normalized with treatment of TFTs.    #6 history of hypercalcemia, now with likely tertiary hyperparathyroidism, pending surgery in April 2023  This is thought to due to renal related secondary hyperparathyroidism.  Had been on Sensipar, however this was stopped as she was trying to become pregnant.  Nephrology felt no need to intervene on serum calcium greater than 11.  January 2023 PTH is 79, serum calcium was 10.8    Interval history: Potentially, the consideration was for Cinacalcet use with the hypercalcemia (calcium as high as 11.2).  However the patient is concerned about the effect on pregnancy.  As she is in the second trimester, she has met with ENT and is pending surgery for tertiary hyperparathyroidism at the end of April.  March 2023 ultrasound did not identify any thyroid nodules but did show 0.9 cm nodule deep to the left thyroid lobe suggestive of parathyroid adenoma.    Past Medical History  Past Medical History:   Diagnosis Date    Anemia in chronic kidney disease     History of  bacterial endocarditis     History of blood transfusion     History of DVT (deep vein thrombosis) 2014    History of seizure 2014    History of subarachnoid hemorrhage     Hydronephrosis     Hypothyroidism     Kidney transplanted 08/03/2019    DCD DDKT. Induction with thymo 6mg/kg.    Orthostatic hypotension     FATOUMATA (obstructive sleep apnea)     Pyelonephritis of transplanted kidney 01/23/2020    Secondary renal hyperparathyroidism (H)     Urinary tract infection        Allergies  Allergies   Allergen Reactions    Contrast Dye Rash     CT contrast allergy 12/14/19 rash over eyes. Need to have pre medication before a CT WITH CONTRAST     Diatrizoate Rash     CT contrast allergy 12/14/19 rash over eyes. Need to have pre medication before a CT WITH CONTRAST     Lisinopril Swelling     angioedema    Nitrous Oxide Other (See Comments)     Sense of doom    Chlorhexidine Rash     Rash at site    Sulfa Drugs Rash     Muscle stiffness of neck    Dapsone      Methemoglobinemia    Furosemide Other (See Comments)     Skin flushing    Metrogel [Metronidazole]      Hives diffusely on body after vaginal administration.    Azithromycin Dizziness and Rash    Cefuroxime Rash     Medications  Current Outpatient Medications   Medication Sig Dispense Refill    acetaminophen (TYLENOL) 325 MG tablet Take 2 tablets (650 mg) by mouth every 4 hours as needed for mild pain 100 tablet 3    aspirin (ASA) 81 MG chewable tablet Take 1 tablet (81 mg) by mouth daily (Patient taking differently: Take 81 mg by mouth every evening) 90 tablet 1    azaTHIOprine (IMURAN) 50 MG tablet Take 3 tablets (150 mg) by mouth daily (Patient taking differently: Take 150 mg by mouth every evening) 270 tablet 3    cyanocobalamin (VITAMIN B-12) 1000 MCG tablet Take 1 tablet (1,000 mcg) by mouth daily 90 tablet 1    EPINEPHrine (ANY BX GENERIC EQUIV) 0.3 MG/0.3ML injection 2-pack       famotidine (PEPCID) 20 MG tablet Take 1 tablet (20 mg) by mouth 2 times daily  (Patient taking differently: Take 20 mg by mouth as needed) 180 tablet 3    ferrous sulfate (FEROSUL) 325 (65 Fe) MG tablet Take 1 tablet (325 mg) by mouth daily (with breakfast) 90 tablet 1    fluticasone (FLONASE) 50 MCG/ACT nasal spray Spray 1 spray into both nostrils daily as needed for rhinitis or allergies 16 g 1    levothyroxine (SYNTHROID/LEVOTHROID) 25 MCG tablet TAKE 1.5 tablet daily 6 days per week and 1 tablet daily on the 7th day ( will be titrating up) (Patient taking differently: Take 25 mcg by mouth every morning TAKE 1.5 tablet daily 6 days per week and 1 tablet daily on the 7th day ( will be titrating up)) 150 tablet 3    polyethylene glycol (MIRALAX) 17 GM/Dose powder Take 17 g (1 capful) by mouth daily as needed for constipation 507 g 3    Prenatal Vit-Fe Fumarate-FA (PRENATAL MULTIVITAMIN W/IRON) 27-0.8 MG tablet Take 1 tablet by mouth daily (Patient taking differently: Take 1 tablet by mouth every evening) 90 tablet 3    simethicone (MYLICON) 125 MG chewable tablet Take 1 tablet (125 mg) by mouth 4 times daily as needed for intestinal gas 120 tablet 3    tacrolimus (GENERIC EQUIVALENT) 0.5 MG capsule Tacrolimus 0.5mg - take 1 capsule by mouth twice daily, with 1mg capsules for a total dose of 3.5 twice daily. (Patient taking differently: Take 0.5 mg by mouth 2 times daily Tacrolimus 0.5mg - take 1 capsule by mouth twice daily, with 1mg capsules for a total dose of 3.5 twice daily.) 60 capsule 11    tacrolimus (GENERIC EQUIVALENT) 1 MG capsule Tacrolimus 1 mg - take 3 capsules by mouth twice a day, with 0.5mg capsules for total dose of 3.5mg twice daily (Patient taking differently: Take 3 mg by mouth 2 times daily Tacrolimus 1 mg - take 3 capsules by mouth twice a day, with 0.5mg capsules for total dose of 3.5mg twice daily) 180 capsule 11    vitamin C (ASCORBIC ACID) 250 MG tablet Take 1 tablet (250 mg) by mouth daily 90 tablet 1     Family History  family history includes Cerebrovascular  "Disease in her father and sister; Coronary Artery Disease in her father; Diabetes in her sister; Heart Disease in her father; Hypertension in her sister; Kidney Disease in her mother and sister; Kidney failure in her mother; Sleep Apnea in her father.  Social History  Social History     Socioeconomic History    Marital status: Single     Spouse name: Jt Aleman (culturally )    Number of children: 0    Years of education: Not on file    Highest education level: Not on file   Occupational History    Occupation: Pharmacy Technician   Tobacco Use    Smoking status: Never    Smokeless tobacco: Never   Vaping Use    Vaping status: Never Used   Substance and Sexual Activity    Alcohol use: No     Alcohol/week: 0.0 standard drinks of alcohol    Drug use: No    Sexual activity: Yes     Partners: Male   Other Topics Concern    Parent/sibling w/ CABG, MI or angioplasty before 65F 55M? Not Asked   Social History Narrative    Date of Service:5/15/2013     Name: Mona VALDOVINOS (Month, Day, Year of birth): 1990     MRN: [id number]     New Patient: Yes    Preferred Language: English     Needed: No    County of Residence: [place]    Marital Status:     Household size: 8    Number of Dependents:0     Pregnant: 0    Average Monthly Income: $ 600    Citizenship Status: Citizen    Gave Pt [employer] and [employer] applications     Social Determinants of Health     Financial Resource Strain: Not on file   Food Insecurity: Not on file   Transportation Needs: Not on file   Physical Activity: Not on file   Stress: Not on file   Social Connections: Not on file   Intimate Partner Violence: Not on file   Housing Stability: Not on file       ROS  Per HPI    Physical Exam  GENERAL: Healthy, alert and no distress\",\"EYES: Eyes grossly normal to inspection.  No discharge or erythema, or obvious scleral/conjunctival abnormalities.\",\"RESP: No audible wheeze, cough, or visible cyanosis.  No visible retractions or increased " "work of breathing.  \",\"SKIN: Visible skin clear. No significant rash, abnormal pigmentation or lesions.\",\"NEURO: Cranial nerves grossly intact.  Mentation and speech appropriate for age.\",\"PSYCH: Mentation appears normal, affect normal/bright, judgement and insight intact, normal speech and appearance well-groomed.    RESULTS  Office Visit on 04/12/2023   Component Date Value Ref Range Status    Total Protein Urine mg/dL 04/12/2023 7.1  1.0 - 14.0 mg/dL Final    The reference ranges have not been established in urine protein. The results should be integrated into the clinical context for interpretation.    Total Protein UR MG/MG CR 04/12/2023 0.06  0.00 - 0.20 mg/mg Cr Final    Creatinine Urine mg/dL 04/12/2023 110.2  mg/dL Final    The reference ranges have not been established in urine creatinine. The results should be integrated into the clinical context for interpretation.   Lab on 04/10/2023   Component Date Value Ref Range Status    Sodium 04/10/2023 136  136 - 145 mmol/L Final    Potassium 04/10/2023 4.1  3.4 - 5.3 mmol/L Final    Chloride 04/10/2023 105  98 - 107 mmol/L Final    Carbon Dioxide (CO2) 04/10/2023 22  22 - 29 mmol/L Final    Anion Gap 04/10/2023 9  7 - 15 mmol/L Final    Urea Nitrogen 04/10/2023 18.0  6.0 - 20.0 mg/dL Final    Creatinine 04/10/2023 0.92  0.51 - 0.95 mg/dL Final    Calcium 04/10/2023 10.1 (H)  8.6 - 10.0 mg/dL Final    Glucose 04/10/2023 91  70 - 99 mg/dL Final    GFR Estimate 04/10/2023 85  >60 mL/min/1.73m2 Final    eGFR calculated using 2021 CKD-EPI equation.    WBC Count 04/10/2023 9.2  4.0 - 11.0 10e3/uL Final    RBC Count 04/10/2023 3.22 (L)  3.80 - 5.20 10e6/uL Final    Hemoglobin 04/10/2023 10.1 (L)  11.7 - 15.7 g/dL Final    Hematocrit 04/10/2023 30.2 (L)  35.0 - 47.0 % Final    MCV 04/10/2023 94  78 - 100 fL Final    MCH 04/10/2023 31.4  26.5 - 33.0 pg Final    MCHC 04/10/2023 33.4  31.5 - 36.5 g/dL Final    RDW 04/10/2023 13.4  10.0 - 15.0 % Final    Platelet Count " 04/10/2023 199  150 - 450 10e3/uL Final    Tacrolimus by Tandem Mass Spectrom* 04/10/2023 4.3 (L)  5.0 - 15.0 ug/L Final    Comment: Tacrolimus Reference Range (ug/L):    Kidney Transplant:  Pediatric  0-3 months post transplant: 10-12  3-6 months post transplant: 8-10  6-12 months post transplant: 6-8  >12 months post transplant: 4-7    Adult  0-6 months post transplant: 8-10  6-12 months post transplant: 6-8  >12 months post transplant: 4-6  >5 years post transplant: 3-5    Heart Transplant:  Pediatric  0-12 months post transplant: 10-15  >12 months post transplant: 5-10    Adult  0-3 months post transplant: 10-15  3-6 months post transplant: 8-12  6-12 months post transplant: 6-12  >12 months post transplant: 6-10    Lung Transplant:  0-12 months post transplant: 10-15  >12 months post transplant: 8-12    Liver Transplant:  Pediatric  0-3 months post transplant: 10-15  3-6 months post transplant: 8-10  6 months-5 years post transplant: 6-8   >5 years post transplant: 1-3    Adult  0-3 months post transplant: 10-12  3-6 months post transplant: 8-10  >6 months post transplant: 6-8    Pancreas Transplant:  0-6                            months post transplant: 8-10  >6 months post transplant: 5-8    Tacrolimus Last Dose Date 04/10/2023 4/9/2023   Final    Tacrolimus Last Dose Time 04/10/2023 10:00 PM   Final   Office Visit on 03/29/2023   Component Date Value Ref Range Status    Trichomonas 03/29/2023 Absent  Absent Final    Yeast 03/29/2023 Absent  Absent Final    Clue Cells 03/29/2023 Absent  Absent Final    WBCs/high power field 03/29/2023 1+ (A)  None Final    Color Urine 03/29/2023 Light Yellow  Colorless, Straw, Light Yellow, Yellow Final    Appearance Urine 03/29/2023 Clear  Clear Final    Glucose Urine 03/29/2023 Negative  Negative mg/dL Final    Bilirubin Urine 03/29/2023 Negative  Negative Final    Ketones Urine 03/29/2023 Negative  Negative mg/dL Final    Specific Gravity Urine 03/29/2023 1.010  1.003 -  1.035 Final    Blood Urine 03/29/2023 Negative  Negative Final    pH Urine 03/29/2023 6.0  5.0 - 7.0 Final    Protein Albumin Urine 03/29/2023 Negative  Negative mg/dL Final    Urobilinogen Urine 03/29/2023 Normal  Normal, 2.0 mg/dL Final    Nitrite Urine 03/29/2023 Negative  Negative Final    Leukocyte Esterase Urine 03/29/2023 Negative  Negative Final    Bacteria Urine 03/29/2023 Few (A)  None Seen /HPF Final    Mucus Urine 03/29/2023 Present (A)  None Seen /LPF Final    RBC Urine 03/29/2023 0  <=2 /HPF Final    WBC Urine 03/29/2023 1  <=5 /HPF Final    Squamous Epithelials Urine 03/29/2023 3 (H)  <=1 /HPF Final    Culture 03/29/2023 <10,000 CFU/mL Mixture of urogenital anita   Final   Lab on 03/27/2023   Component Date Value Ref Range Status    Sodium 03/27/2023 135 (L)  136 - 145 mmol/L Final    Potassium 03/27/2023 4.6  3.4 - 5.3 mmol/L Final    Chloride 03/27/2023 105  98 - 107 mmol/L Final    Carbon Dioxide (CO2) 03/27/2023 21 (L)  22 - 29 mmol/L Final    Anion Gap 03/27/2023 9  7 - 15 mmol/L Final    Urea Nitrogen 03/27/2023 18.3  6.0 - 20.0 mg/dL Final    Creatinine 03/27/2023 0.94  0.51 - 0.95 mg/dL Final    Calcium 03/27/2023 10.6 (H)  8.6 - 10.0 mg/dL Final    Glucose 03/27/2023 91  70 - 99 mg/dL Final    GFR Estimate 03/27/2023 83  >60 mL/min/1.73m2 Final    eGFR calculated using 2021 CKD-EPI equation.    WBC Count 03/27/2023 9.3  4.0 - 11.0 10e3/uL Final    RBC Count 03/27/2023 3.70 (L)  3.80 - 5.20 10e6/uL Final    Hemoglobin 03/27/2023 11.4 (L)  11.7 - 15.7 g/dL Final    Hematocrit 03/27/2023 34.5 (L)  35.0 - 47.0 % Final    MCV 03/27/2023 93  78 - 100 fL Final    MCH 03/27/2023 30.8  26.5 - 33.0 pg Final    MCHC 03/27/2023 33.0  31.5 - 36.5 g/dL Final    RDW 03/27/2023 13.3  10.0 - 15.0 % Final    Platelet Count 03/27/2023 202  150 - 450 10e3/uL Final    Total Protein Urine mg/dL 03/27/2023 5.5  1.0 - 14.0 mg/dL Final    The reference ranges have not been established in urine protein. The results  should be integrated into the clinical context for interpretation.    Total Protein UR MG/MG CR 03/27/2023 0.06  0.00 - 0.20 mg/mg Cr Final    Creatinine Urine mg/dL 03/27/2023 93.0  mg/dL Final    The reference ranges have not been established in urine creatinine. The results should be integrated into the clinical context for interpretation.    Tacrolimus by Tandem Mass Spectrom* 03/27/2023 4.0 (L)  5.0 - 15.0 ug/L Final    Comment: Tacrolimus Reference Range (ug/L):    Kidney Transplant:  Pediatric  0-3 months post transplant: 10-12  3-6 months post transplant: 8-10  6-12 months post transplant: 6-8  >12 months post transplant: 4-7    Adult  0-6 months post transplant: 8-10  6-12 months post transplant: 6-8  >12 months post transplant: 4-6  >5 years post transplant: 3-5    Heart Transplant:  Pediatric  0-12 months post transplant: 10-15  >12 months post transplant: 5-10    Adult  0-3 months post transplant: 10-15  3-6 months post transplant: 8-12  6-12 months post transplant: 6-12  >12 months post transplant: 6-10    Lung Transplant:  0-12 months post transplant: 10-15  >12 months post transplant: 8-12    Liver Transplant:  Pediatric  0-3 months post transplant: 10-15  3-6 months post transplant: 8-10  6 months-5 years post transplant: 6-8   >5 years post transplant: 1-3    Adult  0-3 months post transplant: 10-12  3-6 months post transplant: 8-10  >6 months post transplant: 6-8    Pancreas Transplant:  0-6                            months post transplant: 8-10  >6 months post transplant: 5-8    Tacrolimus Last Dose Date 03/27/2023 3/26/2023   Final    Tacrolimus Last Dose Time 03/27/2023 10:00 PM   Final   Lab on 03/22/2023   Component Date Value Ref Range Status    TSH 03/22/2023 0.77  0.30 - 4.20 uIU/mL Final    T3 Free 03/22/2023 3.3  2.0 - 4.4 pg/mL Final    Free T4 03/22/2023 1.14  0.90 - 1.70 ng/dL Final   Ancillary Procedure on 03/20/2023   Component Date Value Ref Range Status    LVEF  03/20/2023 70%    Final   Office Visit on 03/15/2023   Component Date Value Ref Range Status    Hepatitis B Surface Antibody Instr* 03/15/2023 30.05  <8.00 m[IU]/mL Final    Hepatitis B Surface Antibody 03/15/2023 Reactive   Final    Patient is considered to be immune to infection with hepatitis B when the value is greater than or equal to 12.00 mIU/mL.    Interpretation 03/15/2023 Negative for Intraepithelial Lesion or Malignancy (NILM)    Final    Comment 03/15/2023    Final                    Value:This result contains rich text formatting which cannot be displayed here.    Specimen Adequacy 03/15/2023 Satisfactory for evaluation, endocervical/transformation zone component absent   Final    Clinical Information 03/15/2023    Final                    Value:This result contains rich text formatting which cannot be displayed here.    Reflex Testing 03/15/2023 Yes regardless of result   Final    Previous Abnormal? 03/15/2023    Final                    Value:This result contains rich text formatting which cannot be displayed here.    Performing Labs 03/15/2023    Final                    Value:This result contains rich text formatting which cannot be displayed here.    INR 03/22/2023 1.02  0.85 - 1.15 Final    Thrombin Time 03/22/2023 15.1  13.0 - 19.0 Seconds Final    PTT Ratio 03/22/2023 1.24 (H)  <1.21 Final    Platelet Neutralization 03/22/2023 -3  <=0 Seconds Final    PTT 1:2 MIX 03/22/2023 41  31 - 45 Seconds Final    DRVVT Screen Ratio 03/22/2023 0.80  <1.08 Final    Lupus Result 03/22/2023 Negative  Negative Final    Lupus Interpretation 03/22/2023    Final                    Value:INR is normal.  APTT ratio is elevated.  Platelet Neutralization is negative.  APTT 1:2 Mix is normal.  DRVVT Screen ratio is normal.  Thrombin time is normal.  NEGATIVE TEST; A LUPUS ANTICOAGULANT WAS NOT DETECTED IN THIS SPECIMEN WITHIN THE LIMITS OF THE TESTING REPERTOIRE.  If the clinical picture is strongly suggestive of an antiphospholipid  syndrome, recommend anticardiolipin and beta-2-glycoprotein (IgG and IgM) antibody tests.  Platelet Neutralization and APTT 1:2 Mix are suggestive of factor deficiency.   Recommend factors 8, 9, 11 and 12 (factors VIII, IX, XI, and XII) levels if clinically indicated.      Sachi Cole MD, PhD  [facility]          Cardiolipin Noemy IgG Instrument Erin* 03/15/2023 <2.0  <10.0 GPL-U/mL Final    Cardiolipin Antibody IgG 03/15/2023 Negative  Negative Final    Cardiolipin Noemy IgM Instrument Erin* 03/15/2023 13.0 (H)  <10.0 MPL-U/mL Final    Cardiolipin Antibody IgM 03/15/2023 Weak Positive (A)  Negative Final    Weak positive. Recommend retesting patient after 8-12 weeks.    Beta 2 Glycoprotein 1 Antibody IgM 03/15/2023 <2.4  <7.0 U/mL Final    Negative    Beta 2 Glycoprotein 1 Antibody IgG 03/15/2023 1.3  <7.0 U/mL Final    Negative    Protein S Antigen Free 03/22/2023 63  55 - 125 % Final    Protein C Chromogenic 03/22/2023 115  70 - 170 % Final    Antithrombin III 03/15/2023 91  85 - 135 % Final    Hepatitis C Antibody 03/15/2023 Nonreactive  Nonreactive Final    Culture 03/15/2023 No Growth   Final    VZV Noemy IgG Instrument Value 03/15/2023 59.8  <135.0 Index Final    Varicella Zoster Antibody IgG 03/15/2023 No detectable antibody.   Final    Rubella Noemy IgG Instrument Value 03/15/2023 0.44  <0.90 Index Final    Rubella Antibody IgG 03/15/2023 No detectable antibody.   Final    Neisseria gonorrhoeae 03/15/2023 Negative  Negative Final    Negative for N. gonorrhoeae rRNA by transcription mediated amplification. A negative result by transcription mediated amplification does not preclude the presence of C. trachomatis infection because results are dependent on proper and adequate collection, absence of inhibitors and sufficient rRNA to be detected.    Hepatitis B Surface Antigen 03/15/2023 Nonreactive  Nonreactive Final    HIV Antigen Antibody Combo 03/15/2023 Nonreactive  Nonreactive Final    HIV-1 p24 Ag &  HIV-1/HIV-2 Ab Not Detected    Chlamydia trachomatis 03/15/2023 Negative  Negative Final    A negative result by transcription mediated amplification does not preclude the presence of C. trachomatis infection because results are dependent on proper and adequate collection, absence of inhibitors and sufficient rRNA to be detected.    Treponema Antibody Total 03/15/2023 Nonreactive  Nonreactive Final    Color Urine 03/15/2023 Straw  Colorless, Straw, Light Yellow, Yellow Final    Appearance Urine 03/15/2023 Clear  Clear Final    Glucose Urine 03/15/2023 Negative  Negative mg/dL Final    Bilirubin Urine 03/15/2023 Negative  Negative Final    Ketones Urine 03/15/2023 Negative  Negative mg/dL Final    Specific Gravity Urine 03/15/2023 1.003  1.003 - 1.035 Final    Blood Urine 03/15/2023 Negative  Negative Final    pH Urine 03/15/2023 6.5  5.0 - 7.0 Final    Protein Albumin Urine 03/15/2023 Negative  Negative mg/dL Final    Urobilinogen Urine 03/15/2023 Normal  Normal, 2.0 mg/dL Final    Nitrite Urine 03/15/2023 Negative  Negative Final    Leukocyte Esterase Urine 03/15/2023 Negative  Negative Final    Bacteria Urine 03/15/2023 Few (A)  None Seen /HPF Final    RBC Urine 03/15/2023 1  <=2 /HPF Final    WBC Urine 03/15/2023 0  <=5 /HPF Final    Squamous Epithelials Urine 03/15/2023 1  <=1 /HPF Final    See Scanned Result 03/15/2023 INVITAE NON-INVASIVE PRENATAL SCREENING-Scanned   Final    Sodium 03/15/2023 137  136 - 145 mmol/L Final    Potassium 03/15/2023 4.0  3.4 - 5.3 mmol/L Final    Chloride 03/15/2023 105  98 - 107 mmol/L Final    Carbon Dioxide (CO2) 03/15/2023 23  22 - 29 mmol/L Final    Anion Gap 03/15/2023 9  7 - 15 mmol/L Final    Urea Nitrogen 03/15/2023 15.4  6.0 - 20.0 mg/dL Final    Creatinine 03/15/2023 0.83  0.51 - 0.95 mg/dL Final    Calcium 03/15/2023 11.3 (H)  8.6 - 10.0 mg/dL Final    Glucose 03/15/2023 126 (H)  70 - 99 mg/dL Final    GFR Estimate 03/15/2023 >90  >60 mL/min/1.73m2 Final    eGFR  calculated using 2021 CKD-EPI equation.    WBC Count 03/15/2023 8.9  4.0 - 11.0 10e3/uL Final    RBC Count 03/15/2023 3.93  3.80 - 5.20 10e6/uL Final    Hemoglobin 03/15/2023 11.7  11.7 - 15.7 g/dL Final    Hematocrit 03/15/2023 36.9  35.0 - 47.0 % Final    MCV 03/15/2023 94  78 - 100 fL Final    MCH 03/15/2023 29.8  26.5 - 33.0 pg Final    MCHC 03/15/2023 31.7  31.5 - 36.5 g/dL Final    RDW 03/15/2023 13.4  10.0 - 15.0 % Final    Platelet Count 03/15/2023 205  150 - 450 10e3/uL Final    Total Protein Urine mg/dL 03/15/2023 4.0  1.0 - 14.0 mg/dL Final    The reference ranges have not been established in urine protein. The results should be integrated into the clinical context for interpretation.    Total Protein UR MG/MG CR 03/15/2023 0.25 (H)  0.00 - 0.20 mg/mg Cr Final    Unable to calculate, urine creatinine or protein is outside the detectable limits.    Creatinine Urine mg/dL 03/15/2023 16.0  mg/dL Final    The reference ranges have not been established in urine creatinine. The results should be integrated into the clinical context for interpretation.    Tacrolimus by Tandem Mass Spectrom* 03/15/2023 4.0 (L)  5.0 - 15.0 ug/L Final    Comment: Tacrolimus Reference Range (ug/L):    Kidney Transplant:  Pediatric  0-3 months post transplant: 10-12  3-6 months post transplant: 8-10  6-12 months post transplant: 6-8  >12 months post transplant: 4-7    Adult  0-6 months post transplant: 8-10  6-12 months post transplant: 6-8  >12 months post transplant: 4-6  >5 years post transplant: 3-5    Heart Transplant:  Pediatric  0-12 months post transplant: 10-15  >12 months post transplant: 5-10    Adult  0-3 months post transplant: 10-15  3-6 months post transplant: 8-12  6-12 months post transplant: 6-12  >12 months post transplant: 6-10    Lung Transplant:  0-12 months post transplant: 10-15  >12 months post transplant: 8-12    Liver Transplant:  Pediatric  0-3 months post transplant: 10-15  3-6 months post transplant:  8-10  6 months-5 years post transplant: 6-8   >5 years post transplant: 1-3    Adult  0-3 months post transplant: 10-12  3-6 months post transplant: 8-10  >6 months post transplant: 6-8    Pancreas Transplant:  0-6                            months post transplant: 8-10  >6 months post transplant: 5-8    Tacrolimus Last Dose Date 03/15/2023 3/14/2023   Final    Tacrolimus Last Dose Time 03/15/2023 10:00 PM   Final    Chlamydia Trachomatis 03/15/2023 Negative  Negative Final    Negative for C. trachomatis rRNA by transcription mediated amplification.   A negative result by transcription mediated amplification does not preclude the presence of infection because results are dependent on proper and adequate collection, absence of inhibitors and sufficient rRNA to be detected.    Neisseria gonorrhoeae 03/15/2023 Negative  Negative Final    Negative for N. gonorrhoeae rRNA by transcription mediated amplification. A negative result by transcription mediated amplification does not preclude the presence of C. trachomatis infection because results are dependent on proper and adequate collection, absence of inhibitors and sufficient rRNA to be detected.    ABO/RH(D) 03/15/2023 O POS   Final    Antibody Screen 03/15/2023 Negative  Negative Final    SPECIMEN EXPIRATION DATE 03/15/2023 [id number]   Final    Hold Specimen 03/15/2023 JIC   Final    Glu Gest Screen 1hr 50g 03/15/2023 126  70 - 129 mg/dL Final    Other HR HPV 03/15/2023 Negative  Negative Final    HPV16 DNA 03/15/2023 Negative  Negative Final    HPV18 DNA 03/15/2023 Negative  Negative Final    FINAL DIAGNOSIS 03/15/2023    Final                    Value:This result contains rich text formatting which cannot be displayed here.   Lab on 02/28/2023   Component Date Value Ref Range Status    TSH 02/28/2023 0.37  0.30 - 4.20 uIU/mL Final    Free T4 02/28/2023 1.28  0.90 - 1.70 ng/dL Final    T3 Total 02/28/2023 207 (H)  85 - 202 ng/dL Final    Sodium 02/28/2023 133  (L)  136 - 145 mmol/L Final    Potassium 02/28/2023 4.4  3.4 - 5.3 mmol/L Final    Chloride 02/28/2023 102  98 - 107 mmol/L Final    Carbon Dioxide (CO2) 02/28/2023 22  22 - 29 mmol/L Final    Anion Gap 02/28/2023 9  7 - 15 mmol/L Final    Urea Nitrogen 02/28/2023 17.7  6.0 - 20.0 mg/dL Final    Creatinine 02/28/2023 0.80  0.51 - 0.95 mg/dL Final    Calcium 02/28/2023 10.5 (H)  8.6 - 10.0 mg/dL Final    Glucose 02/28/2023 95  70 - 99 mg/dL Final    GFR Estimate 02/28/2023 >90  >60 mL/min/1.73m2 Final    eGFR calculated using 2021 CKD-EPI equation.    WBC Count 02/28/2023 9.8  4.0 - 11.0 10e3/uL Final    RBC Count 02/28/2023 4.02  3.80 - 5.20 10e6/uL Final    Hemoglobin 02/28/2023 12.2  11.7 - 15.7 g/dL Final    Hematocrit 02/28/2023 37.5  35.0 - 47.0 % Final    MCV 02/28/2023 93  78 - 100 fL Final    MCH 02/28/2023 30.3  26.5 - 33.0 pg Final    MCHC 02/28/2023 32.5  31.5 - 36.5 g/dL Final    RDW 02/28/2023 13.5  10.0 - 15.0 % Final    Platelet Count 02/28/2023 206  150 - 450 10e3/uL Final   Lab on 02/21/2023   Component Date Value Ref Range Status    Sodium 02/21/2023 134 (L)  136 - 145 mmol/L Final    Potassium 02/21/2023 4.3  3.4 - 5.3 mmol/L Final    Chloride 02/21/2023 102  98 - 107 mmol/L Final    Carbon Dioxide (CO2) 02/21/2023 22  22 - 29 mmol/L Final    Anion Gap 02/21/2023 10  7 - 15 mmol/L Final    Urea Nitrogen 02/21/2023 16.9  6.0 - 20.0 mg/dL Final    Creatinine 02/21/2023 0.81  0.51 - 0.95 mg/dL Final    Calcium 02/21/2023 11.2 (H)  8.6 - 10.0 mg/dL Final    Glucose 02/21/2023 93  70 - 99 mg/dL Final    GFR Estimate 02/21/2023 >90  >60 mL/min/1.73m2 Final    eGFR calculated using 2021 CKD-EPI equation.    WBC Count 02/21/2023 9.8  4.0 - 11.0 10e3/uL Final    RBC Count 02/21/2023 4.04  3.80 - 5.20 10e6/uL Final    Hemoglobin 02/21/2023 12.3  11.7 - 15.7 g/dL Final    Hematocrit 02/21/2023 37.7  35.0 - 47.0 % Final    MCV 02/21/2023 93  78 - 100 fL Final    MCH 02/21/2023 30.4  26.5 - 33.0 pg Final     MCHC 02/21/2023 32.6  31.5 - 36.5 g/dL Final    RDW 02/21/2023 13.3  10.0 - 15.0 % Final    Platelet Count 02/21/2023 208  150 - 450 10e3/uL Final    Tacrolimus by Tandem Mass Spectrom* 02/21/2023 4.6 (L)  5.0 - 15.0 ug/L Final    Comment: Tacrolimus Reference Range (ug/L):    Kidney Transplant:  Pediatric  0-3 months post transplant: 10-12  3-6 months post transplant: 8-10  6-12 months post transplant: 6-8  >12 months post transplant: 4-7    Adult  0-6 months post transplant: 8-10  6-12 months post transplant: 6-8  >12 months post transplant: 4-6  >5 years post transplant: 3-5    Heart Transplant:  Pediatric  0-12 months post transplant: 10-15  >12 months post transplant: 5-10    Adult  0-3 months post transplant: 10-15  3-6 months post transplant: 8-12  6-12 months post transplant: 6-12  >12 months post transplant: 6-10    Lung Transplant:  0-12 months post transplant: 10-15  >12 months post transplant: 8-12    Liver Transplant:  Pediatric  0-3 months post transplant: 10-15  3-6 months post transplant: 8-10  6 months-5 years post transplant: 6-8   >5 years post transplant: 1-3    Adult  0-3 months post transplant: 10-12  3-6 months post transplant: 8-10  >6 months post transplant: 6-8    Pancreas Transplant:  0-6                            months post transplant: 8-10  >6 months post transplant: 5-8    Tacrolimus Last Dose Date 02/21/2023 2/20/2023   Final    Tacrolimus Last Dose Time 02/21/2023 10:00 PM   Final    Total Protein Urine mg/dL 02/21/2023 <6.0  1.0 - 14.0 mg/dL Final    The reference ranges have not been established in urine protein. The results should be integrated into the clinical context for interpretation.    Total Protein UR MG/MG CR 02/21/2023    Final    Unable to calculate, urine creatinine or protein is outside the detectable limits.    Creatinine Urine mg/dL 02/21/2023 42.4  mg/dL Final    The reference ranges have not been established in urine creatinine. The results should be integrated  into the clinical context for interpretation.       EXAM: US PARATHYROID  LOCATION: [facility]  DATE/TIME: 3/22/2023 11:48 AM     INDICATION:  Secondary renal hyperparathyroidism (H)  COMPARISON: None.  TECHNIQUE: Thyroid/parathyroid ultrasound.      FINDINGS:  RIGHT lobe: 4.6 x 1.5 x 1.6  cm. Homogeneous echotexture.  Isthmus: 3  mm.  LEFT lobe: 4.1 x 1.6 x 1.2  cm. Homogeneous echotexture.     NECK: No cervical lymphadenopathy.     NODULES: No thyroid nodules.     There is a 9 mm x 6 mm x 4 mm hypoechoic structure deep to the mid left thyroid lobe                                                                         IMPRESSION:  1.  Indeterminate 9 mm hypoechoic structure deep to the mid left thyroid lobe. A parathyroid gland and/or lesion is possible.     ASSESSMENT:    #1 history of hypothyroidism  March 2023 TSH 0.77.  I think she is on a reasonable program with her recent increase in levothyroxine to 37 mcg daily 6 days a week, and 25 mcg daily 1 day/week.  We will have patient continue with monthly TFTs.  She has a recheck TFTs next week.    #2 current pregnancy  Congratulated patient.       #3 history of prediabetes  February 2023 hemoglobin A1c of 5.3.  Currently pregnant.    #4 history of renal transplant in August 2019  Per nephrology.       #5 history of high prolactin  Patient currently pregnant.  It appears that her high prolactin may have been exacerbated by her hypothyroidism.  At any rate, her recent prolactin's have been fairly normal.  No need to repeat the head MRI at this time, although we could consider doing this after she has delivered.    #6 history of hypercalcemia, now with likely tertiary hyperparathyroidism, pending surgery in April 2023  Patient pending surgery due to progressive hypercalcemia from likely tertiary hyperparathyroidism.  Patient given prescription for liquid calcium.  She will let me know if this is not available.  I am interested in having the  patient use liquid calcium at this will enable us to give her high-dose calcium as needed to keep her calcium level stable.  We will plan for weekly calcium checks postsurgery.  We will have patient see me on May 5, 2023 for follow-up.  I will defer to surgery whether she should start calcitriol prior to surgery.      Again, thank you for allowing me to participate in the care of your patient.      Sincerely,    Katrin Lopez MD

## 2023-04-14 NOTE — TELEPHONE ENCOUNTER
"Communication received from Dr. Mcintyre:  \"Labs q2 weeks for now. Please obtain ferritin and iron binding panel. \"    Emergent Propertiest message to Mona to ensure she has labs every 2 weeks.  Orders placed for ferritin & iron binding panel.  "

## 2023-04-14 NOTE — PATIENT INSTRUCTIONS
Take the calcium carbonate solution AFTER surgery - pick it up before surgery - let me know if availablity is an issue    -    Please schedule pt for weekly calcium checks after 4/28/23

## 2023-04-14 NOTE — TELEPHONE ENCOUNTER
PRIOR AUTHORIZATION DENIED    Medication: calcium carbonate 1250 MG/5ML SUSP suspension - EPA DENIED    Denial Date: 4/14/2023    Denial Rational:       Appeal Information:

## 2023-04-14 NOTE — NURSING NOTE
Is the patient currently in the state of [place]? YES    Visit mode:VIDEO    If the visit is dropped, the patient can be reconnected by: VIDEO VISIT: Send to e-mail at: [email or web address]    Will anyone else be joining the visit? NO      How would you like to obtain your AVS? MyChart    Are changes needed to the allergy or medication list? NO    Reason for visit: Video Visit

## 2023-04-17 ENCOUNTER — TELEPHONE (OUTPATIENT)
Dept: ENDOCRINOLOGY | Facility: CLINIC | Age: 33
End: 2023-04-17
Payer: MEDICARE

## 2023-04-17 NOTE — LETTER
4/17/2023       RE: Mona Wagner  [address]  [place] [address]     To Whom It May Concern     I would like her to be on liquid calcium for helping to maintain her calcium levels after parathyroid surgery. This is medically indicated since she needs to be on a lot of calcium after surgery to avoid hypocalcemia - which is especially relevant since she is currently pregnant.     If you have any questions, please feel free to contact my nurse at [phone] select option #3 for triage nurse  or  option #1 for scheduling related questions.    Regards    Katrin Lopez MD     Better Absorption: Liquid calcium is generally more easily absorbed by the body compared to other forms of calcium, such as tablets or capsules. This is because liquid calcium doesn't need to be broken down in the stomach before being absorbed, which can be difficult for some people, especially those with digestive issues.    Convenient and Easy to Take: Liquid calcium is easy to take and doesn't require swallowing large pills. This makes it a good option for people who have difficulty swallowing pills or who have a sensitive stomach.    Versatile: Liquid calcium can be added to a variety of beverages or foods, making it a versatile option for people who want to incorporate more calcium into their diet. It can also be used to fortify other foods, such as smoothies or soups.    Fast-Acting: Because liquid calcium is quickly absorbed by the body, it may provide faster relief for conditions related to calcium deficiency, such as muscle cramps or spasms.

## 2023-04-19 NOTE — TELEPHONE ENCOUNTER
Medication Appeal Initiation    We have initiated an appeal for the requested medication:  Medication: calcium carbonate 1250 MG/5ML SUSP suspension - APPEAL INITIATED  Appeal Start Date:  4/19/2023  Insurance Company: [employer] - Phone [phone] Fax [phone]  Comments:

## 2023-04-20 NOTE — TELEPHONE ENCOUNTER
MEDICATION APPEAL DENIED    Medication: calcium carbonate 1250 MG/5ML SUSP suspension - APPEAL DENIED    Denial Date: 4/19/2023    Denial Rational:       Second Level Appeal Information:     Second level appeals will be managed by the clinic staff and provider. Please contact the [employer] Prior Authorization Team if additional information about the denial is needed.

## 2023-04-24 ENCOUNTER — PRE VISIT (OUTPATIENT)
Dept: OTOLARYNGOLOGY | Facility: CLINIC | Age: 33
End: 2023-04-24

## 2023-04-25 ENCOUNTER — LAB (OUTPATIENT)
Dept: LAB | Facility: HOSPITAL | Age: 33
End: 2023-04-25
Payer: MEDICARE

## 2023-04-25 ENCOUNTER — TELEPHONE (OUTPATIENT)
Dept: TRANSPLANT | Facility: CLINIC | Age: 33
End: 2023-04-25

## 2023-04-25 DIAGNOSIS — Z94.0 KIDNEY REPLACED BY TRANSPLANT: ICD-10-CM

## 2023-04-25 DIAGNOSIS — E03.9 ACQUIRED HYPOTHYROIDISM: ICD-10-CM

## 2023-04-25 DIAGNOSIS — Z48.298 AFTERCARE FOLLOWING ORGAN TRANSPLANT: ICD-10-CM

## 2023-04-25 DIAGNOSIS — D64.9 ANEMIA, UNSPECIFIED TYPE: ICD-10-CM

## 2023-04-25 DIAGNOSIS — Z34.90 PREGNANT: ICD-10-CM

## 2023-04-25 DIAGNOSIS — E58 CALCIUM DEFICIENCY: ICD-10-CM

## 2023-04-25 LAB
ALBUMIN MFR UR ELPH: 6.6 MG/DL (ref 1–14)
ANION GAP SERPL CALCULATED.3IONS-SCNC: 9 MMOL/L (ref 7–15)
BUN SERPL-MCNC: 18.4 MG/DL (ref 6–20)
CALCIUM SERPL-MCNC: 10.6 MG/DL (ref 8.6–10)
CALCIUM SERPL-MCNC: 10.6 MG/DL (ref 8.6–10)
CALCIUM, IONIZED MEASURED: 1.4 MMOL/L (ref 1.11–1.3)
CHLORIDE SERPL-SCNC: 107 MMOL/L (ref 98–107)
CREAT SERPL-MCNC: 0.94 MG/DL (ref 0.51–0.95)
CREAT UR-MCNC: 78 MG/DL
DEPRECATED HCO3 PLAS-SCNC: 21 MMOL/L (ref 22–29)
ERYTHROCYTE [DISTWIDTH] IN BLOOD BY AUTOMATED COUNT: 13.2 % (ref 10–15)
FERRITIN SERPL-MCNC: 1770 NG/ML (ref 6–175)
GFR SERPL CREATININE-BSD FRML MDRD: 83 ML/MIN/1.73M2
GLUCOSE SERPL-MCNC: 92 MG/DL (ref 70–99)
HCT VFR BLD AUTO: 30.5 % (ref 35–47)
HGB BLD-MCNC: 9.8 G/DL (ref 11.7–15.7)
ION CA PH 7.4: 1.39 MMOL/L (ref 1.11–1.3)
IRON BINDING CAPACITY (ROCHE): 181 UG/DL (ref 240–430)
IRON SATN MFR SERPL: 27 % (ref 15–46)
IRON SERPL-MCNC: 49 UG/DL (ref 37–145)
MCH RBC QN AUTO: 30 PG (ref 26.5–33)
MCHC RBC AUTO-ENTMCNC: 32.1 G/DL (ref 31.5–36.5)
MCV RBC AUTO: 93 FL (ref 78–100)
PH: 7.38 (ref 7.35–7.45)
PLATELET # BLD AUTO: 198 10E3/UL (ref 150–450)
POTASSIUM SERPL-SCNC: 4.1 MMOL/L (ref 3.4–5.3)
PROT/CREAT 24H UR: 0.08 MG/MG CR (ref 0–0.2)
RBC # BLD AUTO: 3.27 10E6/UL (ref 3.8–5.2)
SODIUM SERPL-SCNC: 137 MMOL/L (ref 136–145)
T3FREE SERPL-MCNC: 2.8 PG/ML (ref 2–4.4)
T4 FREE SERPL-MCNC: 1.03 NG/DL (ref 0.9–1.7)
TACROLIMUS BLD-MCNC: 2.7 UG/L (ref 5–15)
TME LAST DOSE: ABNORMAL H
TME LAST DOSE: ABNORMAL H
TSH SERPL DL<=0.005 MIU/L-ACNC: 2 UIU/ML (ref 0.3–4.2)
WBC # BLD AUTO: 8.7 10E3/UL (ref 4–11)

## 2023-04-25 PROCEDURE — 80197 ASSAY OF TACROLIMUS: CPT

## 2023-04-25 PROCEDURE — 82728 ASSAY OF FERRITIN: CPT

## 2023-04-25 PROCEDURE — 82330 ASSAY OF CALCIUM: CPT

## 2023-04-25 PROCEDURE — 85027 COMPLETE CBC AUTOMATED: CPT

## 2023-04-25 PROCEDURE — 84439 ASSAY OF FREE THYROXINE: CPT

## 2023-04-25 PROCEDURE — 36415 COLL VENOUS BLD VENIPUNCTURE: CPT

## 2023-04-25 PROCEDURE — 84156 ASSAY OF PROTEIN URINE: CPT

## 2023-04-25 PROCEDURE — 84481 FREE ASSAY (FT-3): CPT

## 2023-04-25 PROCEDURE — 80048 BASIC METABOLIC PNL TOTAL CA: CPT

## 2023-04-25 PROCEDURE — 84443 ASSAY THYROID STIM HORMONE: CPT

## 2023-04-25 PROCEDURE — 83550 IRON BINDING TEST: CPT

## 2023-04-25 RX ORDER — TACROLIMUS 1 MG/1
CAPSULE ORAL
Qty: 210 CAPSULE | Refills: 11 | Status: SHIPPED | OUTPATIENT
Start: 2023-04-25 | End: 2023-05-04

## 2023-04-25 RX ORDER — TACROLIMUS 0.5 MG/1
0.5 CAPSULE ORAL EVERY MORNING
Qty: 30 CAPSULE | Refills: 11 | Status: SHIPPED | OUTPATIENT
Start: 2023-04-25 | End: 2023-05-04

## 2023-04-25 NOTE — TELEPHONE ENCOUNTER
Pt called on call coordinator concerned about her tacro level that came back low at 2.7. Goal 4-6. Pt is pregnant. Cr = 0.9. Accurate 12 hour level. Current dose 3.5 mg bid. Writer suggested she increase to 4 mg in the evening. Repeat a level in 1 week. Primary coordinator updated.

## 2023-04-25 NOTE — TELEPHONE ENCOUNTER
Patient Call: General  Route to LPN    Reason for call: pt called in concerned about low tacrolimus result from today . Pt is pregnant and said she will call on call tx line immediatly as she needs to talk to someone right away      Call back needed? Yes    Return Call Needed  Same as documented in contacts section  When to return call?: Same day: Route High Priority

## 2023-04-25 NOTE — TELEPHONE ENCOUNTER
Call back to Mona Tacro level low at 2.7 at 12 hours trough. Confirmed no missed doses or changes in medications or health. Increase dose to 3.5 mg every AM and 4 mg every PM. Will recheck labs next week.

## 2023-04-26 ENCOUNTER — OFFICE VISIT (OUTPATIENT)
Dept: MATERNAL FETAL MEDICINE | Facility: CLINIC | Age: 33
End: 2023-04-26
Attending: ADVANCED PRACTICE MIDWIFE
Payer: MEDICARE

## 2023-04-26 VITALS
SYSTOLIC BLOOD PRESSURE: 108 MMHG | HEART RATE: 92 BPM | DIASTOLIC BLOOD PRESSURE: 72 MMHG | BODY MASS INDEX: 33.88 KG/M2 | RESPIRATION RATE: 16 BRPM | WEIGHT: 173.5 LBS | OXYGEN SATURATION: 98 %

## 2023-04-26 DIAGNOSIS — E07.9 DISORDER OF THYROID, UNSPECIFIED: ICD-10-CM

## 2023-04-26 DIAGNOSIS — Z94.0 KIDNEY REPLACED BY TRANSPLANT: ICD-10-CM

## 2023-04-26 DIAGNOSIS — O09.91 HIGH-RISK PREGNANCY IN FIRST TRIMESTER: ICD-10-CM

## 2023-04-26 DIAGNOSIS — Z87.440 HISTORY OF RECURRENT UTIS: ICD-10-CM

## 2023-04-26 PROCEDURE — 99213 OFFICE O/P EST LOW 20 MIN: CPT | Performed by: ADVANCED PRACTICE MIDWIFE

## 2023-04-26 PROCEDURE — G0463 HOSPITAL OUTPT CLINIC VISIT: HCPCS | Performed by: ADVANCED PRACTICE MIDWIFE

## 2023-04-26 NOTE — NURSING NOTE
Mona seen in clinic today for OB visit at 18w5d gestation for pregnancy complicated by history of kidney transplant, FATOUMATA, hypothyroid, anemia, hx bacterial endocarditis, hx DVT, hx prediabetes (see report/notes). VSS. Pt reports normal fetal movement, see flowsheet. Pt denies bldg/lof/change in discharge/contractions/headache/vision changes/chest pain/SOB/edema. Pt assessed for  labor, preeclampsia, infection, fetal wellbeing without concerns. She does report a stuffy nose, especially at night without other symptoms. She also has noticed some pain/cramping on the outside of her left thigh and hip. She is getting ready for her surgery on Friday and is feeling a bit nervous as is to be expected. Pt notified to review new pt education in AVS, verbally reviewed highlights. Sonam Toledo CNM met with pt and discussed POC. Plan for return for L2 and OBV as scheduled on 5/10. Pt discharged stable and ambulatory.

## 2023-04-26 NOTE — PROGRESS NOTES
"Maternal fetal Medicine OB Follow up visit.     Mona Wagner  : 1990  MRN: [id number]    CC: OB Follow-up    Subjective:  Mona Wagner is a 32 year old  at 18w5d presenting for routine OB follow-up. Today, she is feeling well. A bit anxious for her upcoming surgery, but states her calcium levels continue to be elevated so she feels surgery is the right decision. Her tacrolimus was recently increased and iron studies were completed per her nephrologist given her recent hemoglobin. She states he likely won't recommend epo unless her hemoglobin falls below 9.    From an OB standpoint, she has noticed some increased nasal congestion and drainage, thinks this is worsened by allergies. Her headaches she was previously having are improving. She continues to have new and occasional abdominal \"twinges\", but denies regular, painful contractions, denies loss of fluid or vaginal bleeding. Thinks she may be starting to feel occasional fetal movement.         OB Hx:  OB History    Para Term  AB Living   1 0 0 0 0 0   SAB IAB Ectopic Multiple Live Births   0 0 0 0 0      # Outcome Date GA Lbr Robinson/2nd Weight Sex Delivery Anes PTL Lv   1 Current                  Objective:  /72 (BP Location: Right arm, Patient Position: Sitting, Cuff Size: Adult Regular)   Pulse 92   Resp 16   Wt 78.7 kg (173 lb 8 oz)   LMP 2022   SpO2 98%   BMI 33.88 kg/m      Gen: alert, oriented, NAD  Skin: warm, dry, intact  Respiratory: breathing unlabored, no SOB  Abdominal: gravid, non-tender, fundus just under umbilicus, FHTs: 145  Pelvic: deferred  Extremities: WNL. AV fistula in place on left forearm.  Psych: mood WNL, behavior WNL      OB Ultrasound:  Please see \"imaging\" tab under chart review for today's ultrasound results.      Assessment/Plan:  32 year old  at 18w5d here for follow OB visit.    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 Mcintyre in transplant nephrology. Last visit on 4/13 with plans for labs q2 weeks with increase to weekly in the 3rd trimester.  - Resolution of cHTN and anemia of chronic disease s/p transplant.  - Continue with Tacrolimus (goal 4-6) and Azathioprine as prescribed by nephrology. Dose recently increased to 3.5mg AM and 4mg PM daily.   - Baseline Cr 0.8-1.1; mild proteinuria UPC ratio 0.25.   - Close management of blood pressure with goal of <130/90  - Early detection and treatment of asymptomatic bacteriuria with urine culture at least every trimester. UA/UC last on 3/29 and WNL.  - Planning resection of parathyroid glands on 4/28. Per pre-op notes, surgery is scheduled on the [facility] to have access to OB and OB anesthesia.    Per endo: will plan for for liquid calcium supplementation for stable levels post-surgery.     Hypothyroidism:  - Follows with endo. Continue current levothyroxine prescription.  - TSH of 0.37 and normal T4 on 2/28/23.     Hx DVT:  - Assessment for inherited thrombophilias including Factor V Leiden and Prothrombin Gene Mutation:  Negative.  - Additional hypercoagulability work up negative.     Hx of bacterial endocarditis:  - Echo on 3/20:  Interpretation Summary  Global and regional left ventricular function is hyperkinetic with an EF >70%.  Right ventricular function, chamber size, wall motion, and thickness are  normal.  Pulmonary artery systolic pressure is normal.  The inferior vena cava cannot be assessed.  No pericardial effusion is present.     Routine PNC:  - Prenatal labs:               Rh: +  antibody: neg               HepB/HIV/RPR/HepC: nonreactive               GC/CT: neg               Rubella: non-immune  Varicella:  non-immune               GCT: passed early GCT. Will repeat 24-28 weeks               UC: no growth               Pap: NIL and HPV neg  - Immunizations: s/p Flu and Covid. Recommend Tdap at 28 weeks  - Genetic screening: NIPT low-risk. Low-risk NT  - Reviewed common musculoskeletal discomforts of pregnancy and that given her transplanted kidney location and bicornuate uterus, she may be more prone to feeling stretching associated with gravid uterus.  - PTL s/s reviewed to help differentiate from typical pregnancy symptoms.       Surveillance:  - 2/3 complete. Remainder of comprehensive anatomy scan in 2 weeks  - Serial growth US q4 after anatomy scan  - Weekly BPPs at 32 weeks     Delivery Planning:  - Timing of delivery will be indicated by secondary sequelae, but generally 37-39 weeks.  reserved for usual obstetrical indications.   - Labor analgesia: will discuss at later date  - Feeding: plans to formula feed  - Contraception: needs to be discussed    RTC in 2 weeks    20 minutes spent on the date of the encounter, doing chart review, history and exam, documentation and further activities as noted.      Sonam Toledo CNM on 2023 at 12:00 PM

## 2023-04-26 NOTE — PATIENT INSTRUCTIONS
Pregnancy: Your Second Trimester Changes  Each day, you and your baby are changing and growing together. Here s a quick look at what s happening to both of you.  How you are changing  Even when you don t notice it, your body is adapting to meet the needs of your growing baby. The changes in your body might also affect your moods.  Your body  Your uterus expands as your baby grows. As the weeks go by, you will feel more pressure on your bladder, stomach, and other organs. You may notice some skin color changes on your forehead, nose, or cheeks. Freckles may darken, and moles may grow. You may notice a darker line on your abdomen between your belly button and pubic bone in the midline.  Your moods  The second trimester is often easier than the first. Still, be prepared for mood swings. These are from the increase in hormones made by your body. Hormones are chemicals that affect the way organs work. These mood swings are a normal part of pregnancy.  How your baby is growing    Month 4  Your baby s heartbeat may be heard with a Doppler (handheld ultrasound device) by 9 to 10 weeks. Eyebrows, eyelashes, and fingernails begin to form.    Month 5  You may feel your baby move. After a growth spurt, your baby nears 10 inches.    Month 6  Your baby s fingerprints have formed. Your baby weighs about 1 to 2 pounds and is about 12 inches long.    Parchment last reviewed this educational content on 7/1/2021 2000-2022 [employer]. All rights reserved. This information is not intended as a substitute for professional medical care. Always follow your healthcare professional's instructions.          Adapting to Pregnancy: Second Trimester  Keep up the healthy habits you started in your first trimester. You might be a little more tired than normal. So plan your day wisely. Look at the tips below and choose the ones that suit your lifestyle.   Note  If you have any questions, talk with your healthcare provider.   If  you work  If you can, adjust your work with your employer to fit your needs. Try these tips:     If you stand for long periods, find ways to do some tasks while sitting. Also, try to stand with 1 foot resting on a low stool or ledge. Shift your weight from foot to foot often. Wear low-heeled shoes.    If you sit, keep your knees level with your hips. Rest your feet on a firm surface. Sit tall with support for your low back.    If you work long hours, ask about adjusting your schedule. Try taking shorter breaks more often.  When you travel  The second trimester may be the best time for any travel. Talk to your healthcare provider about any special plans you may need to make. Always:     Wear a seat belt. Fasten the lap part under your belly. Wear the shoulder part also.    Take breaks often during long trips by car or plane. Move around to stretch your legs.    Drink plenty of fluids on flights. The air in plane cabins is very dry.    Stay out of hot climates or high altitudes if you are not used to them.    Stay away from places where the food and water might make you sick.    Make sure you are up-to-date on all vaccines, including the flu vaccine. This is especially important when traveling overseas.  Taking time to relax  Find time to rest and relax at work or at home:     Take short time-outs daily. Do relaxation exercises.    Breathe deeply during stressful times.    Try not to take on too much. Plan tasks for times when you have the most energy.    Take naps when you can. Or just sit and relax.    After week 16, don't lie on your back for more than a few minutes. Instead, lie on your side. Switch sides often.    Having sex  Unless your healthcare provider tells you otherwise, there is no reason to stop having sex now. Blood supply increases to the pelvic area in the second trimester. Because of this, sex might be more enjoyable. Try different positions and see what s best. Also talk with your partner about any  changes in desire. Spotting may happen after sex. Let your healthcare provider know if there is heavy bleeding.   Keeping your environment safe  You can still clean your house and use scented products. Just take some simple precautions:     Wear gloves when using cleaning fluids.    Open windows to let in fresh air. Use a fan if you paint.    Stay away from secondhand smoke.    Don t breathe fumes from nail polish, hair spray, cleansers, or other chemicals.  How daily issues affect your health  Many things in your daily life impact your health. This can include transportation, money problems, housing, access to food, and . If you can t get to medical appointments, you may not receive the care you need. When money is tight, it may be difficult to pay for medicines. And living far from a grocery store can make it hard to buy healthy food.   If you have concerns in any of these or other areas, talk with your healthcare team. They may know of local resources to assist you. Or they may have a staff person who can help.   [employer] last reviewed this educational content on 6/1/2021 2000-2022 [employer]. All rights reserved. This information is not intended as a substitute for professional medical care. Always follow your healthcare professional's instructions.

## 2023-04-26 NOTE — H&P (VIEW-ONLY)
"Maternal fetal Medicine OB Follow up visit.     Mona Wagner  : 1990  MRN: [id number]    CC: OB Follow-up    Subjective:  Mona Wagner is a 32 year old  at 18w5d presenting for routine OB follow-up. Today, she is feeling well. A bit anxious for her upcoming surgery, but states her calcium levels continue to be elevated so she feels surgery is the right decision. Her tacrolimus was recently increased and iron studies were completed per her nephrologist given her recent hemoglobin. She states he likely won't recommend epo unless her hemoglobin falls below 9.    From an OB standpoint, she has noticed some increased nasal congestion and drainage, thinks this is worsened by allergies. Her headaches she was previously having are improving. She continues to have new and occasional abdominal \"twinges\", but denies regular, painful contractions, denies loss of fluid or vaginal bleeding. Thinks she may be starting to feel occasional fetal movement.         OB Hx:  OB History    Para Term  AB Living   1 0 0 0 0 0   SAB IAB Ectopic Multiple Live Births   0 0 0 0 0      # Outcome Date GA Lbr Robinson/2nd Weight Sex Delivery Anes PTL Lv   1 Current                  Objective:  /72 (BP Location: Right arm, Patient Position: Sitting, Cuff Size: Adult Regular)   Pulse 92   Resp 16   Wt 78.7 kg (173 lb 8 oz)   LMP 2022   SpO2 98%   BMI 33.88 kg/m      Gen: alert, oriented, NAD  Skin: warm, dry, intact  Respiratory: breathing unlabored, no SOB  Abdominal: gravid, non-tender, fundus just under umbilicus, FHTs: 145  Pelvic: deferred  Extremities: WNL. AV fistula in place on left forearm.  Psych: mood WNL, behavior WNL      OB Ultrasound:  Please see \"imaging\" tab under chart review for today's ultrasound results.      Assessment/Plan:  32 year old  at 18w5d here for follow OB visit.    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 Mcintyre in transplant nephrology. Last visit on 4/13 with plans for labs q2 weeks with increase to weekly in the 3rd trimester.  - Resolution of cHTN and anemia of chronic disease s/p transplant.  - Continue with Tacrolimus (goal 4-6) and Azathioprine as prescribed by nephrology. Dose recently increased to 3.5mg AM and 4mg PM daily.   - Baseline Cr 0.8-1.1; mild proteinuria UPC ratio 0.25.   - Close management of blood pressure with goal of <130/90  - Early detection and treatment of asymptomatic bacteriuria with urine culture at least every trimester. UA/UC last on 3/29 and WNL.  - Planning resection of parathyroid glands on 4/28. Per pre-op notes, surgery is scheduled on the [facility] to have access to OB and OB anesthesia.    Per endo: will plan for for liquid calcium supplementation for stable levels post-surgery.     Hypothyroidism:  - Follows with endo. Continue current levothyroxine prescription.  - TSH of 0.37 and normal T4 on 2/28/23.     Hx DVT:  - Assessment for inherited thrombophilias including Factor V Leiden and Prothrombin Gene Mutation:  Negative.  - Additional hypercoagulability work up negative.     Hx of bacterial endocarditis:  - Echo on 3/20:  Interpretation Summary  Global and regional left ventricular function is hyperkinetic with an EF >70%.  Right ventricular function, chamber size, wall motion, and thickness are  normal.  Pulmonary artery systolic pressure is normal.  The inferior vena cava cannot be assessed.  No pericardial effusion is present.     Routine PNC:  - Prenatal labs:               Rh: +  antibody: neg               HepB/HIV/RPR/HepC: nonreactive               GC/CT: neg               Rubella: non-immune  Varicella:  non-immune               GCT: passed early GCT. Will repeat 24-28 weeks               UC: no growth               Pap: NIL and HPV neg  - Immunizations: s/p Flu and Covid. Recommend Tdap at 28 weeks  - Genetic screening: NIPT low-risk. Low-risk NT  - Reviewed common musculoskeletal discomforts of pregnancy and that given her transplanted kidney location and bicornuate uterus, she may be more prone to feeling stretching associated with gravid uterus.  - PTL s/s reviewed to help differentiate from typical pregnancy symptoms.       Surveillance:  - 2/3 complete. Remainder of comprehensive anatomy scan in 2 weeks  - Serial growth US q4 after anatomy scan  - Weekly BPPs at 32 weeks     Delivery Planning:  - Timing of delivery will be indicated by secondary sequelae, but generally 37-39 weeks.  reserved for usual obstetrical indications.   - Labor analgesia: will discuss at later date  - Feeding: plans to formula feed  - Contraception: needs to be discussed    RTC in 2 weeks    20 minutes spent on the date of the encounter, doing chart review, history and exam, documentation and further activities as noted.      Sonam Toledo CNM on 2023 at 12:00 PM

## 2023-04-27 ENCOUNTER — TELEPHONE (OUTPATIENT)
Dept: TRANSPLANT | Facility: CLINIC | Age: 33
End: 2023-04-27
Payer: MEDICARE

## 2023-04-27 ENCOUNTER — ANESTHESIA EVENT (OUTPATIENT)
Dept: SURGERY | Facility: CLINIC | Age: 33
End: 2023-04-27
Payer: MEDICARE

## 2023-04-27 ASSESSMENT — ENCOUNTER SYMPTOMS
ORTHOPNEA: 0
SEIZURES: 1

## 2023-04-27 ASSESSMENT — LIFESTYLE VARIABLES: TOBACCO_USE: 0

## 2023-04-27 NOTE — TELEPHONE ENCOUNTER
[facility] Call Center    Phone Message    May a detailed message be left on voicemail: yes     Reason for Call: Other: Writer called patient to schedule a follow up with Dr. Mcintyre. Writer was able to schedule one for 10/3, patient stated she though she needed to be seen in August and it could be with any provider. The order that writers is scheduling off of just states Dr. Mcintyre. Please call back patient to discuss.     Action Taken: Message routed to:  [facility] ([facility]): Angeles Leroy RNCC    Travel Screening: Not Applicable

## 2023-04-27 NOTE — ANESTHESIA PREPROCEDURE EVALUATION
Anesthesia Pre-Procedure Evaluation    Patient: Mona Wagner   MRN: [id number] : 1990        Procedure : Procedure(s):  Resection 3 and 1/2 parathyroid glands          Past Medical History:   Diagnosis Date     Anemia in chronic kidney disease      History of bacterial endocarditis      History of blood transfusion      History of DVT (deep vein thrombosis)      History of seizure      History of subarachnoid hemorrhage      Hydronephrosis      Hypothyroidism      Kidney transplanted 2019    DCD DDKT. Induction with thymo 6mg/kg.     Orthostatic hypotension      FATOUMATA (obstructive sleep apnea)      Pyelonephritis of transplanted kidney 2020     Secondary renal hyperparathyroidism (H)      Urinary tract infection       Past Surgical History:   Procedure Laterality Date     BENCH KIDNEY N/A 8/3/2019    Procedure: BACKBENCH PREPARATION, ALLOGRAFT, KIDNEY;  Surgeon: Dorian Johnson MD;  Location: [facility] OR     COMBINED CYSTOSCOPY, RETROGRADES, EXCHANGE STENT URETER(S) Right 2020    Procedure: CYSTOSCOPY, CYSOTGRAM, WITH RETROGRADE PYELOGRAM, ureteroscopy,  AND URETERAL STENT;  Surgeon: Wally Britt MD;  Location: [facility] OR     COMBINED CYSTOSCOPY, RETROGRADES, URETEROSCOPY, LASER HOLMIUM LITHOTRIPSY URETER(S), INSERT STENT N/A 2019    Procedure: CYSTOURETEROSCOPY, WITH RETROGRADE PYELOGRAM of transplant kidney, STENT INSERTION, Laser on standby;  Surgeon: Wally Britt MD;  Location: [facility] OR     CREATE FISTULA ARTERIOVENOUS UPPER EXTREMITY  2014    Procedure: CREATE FISTULA ARTERIOVENOUS UPPER EXTREMITY;  Left Wrist Arteriovenous Fistula Placement;  Surgeon: Shashi Castro MD;  Location: [facility] OR     CREATE FISTULA ARTERIOVENOUS UPPER EXTREMITY       CYSTOSCOPY, RETROGRADES, INSERT STENT URETER(S), COMBINED N/A 2020    Procedure: CYSTOSCOPY, WITH RETROGRADE PYELOGRAM, CYSTOGRAM AND URETERAL STENT INSERTION - TRANSPLANT KIDNEY;  Surgeon: Wally Britt MD;   "Location: [facility] OR     IR CEREBRAL ANGIOGRAM  2014     PERCUTANEOUS BIOPSY KIDNEY Right 2019    Procedure: Right Kidney Biopsy;  Surgeon: Deny Rios MD;  Location: [facility] OR     RASTA/DIALYSIS CATHETER  12/10/2013          TRANSPLANT KIDNEY RECIPIENT  DONOR N/A 8/3/2019    Procedure: TRANSPLANT, KIDNEY, RECIPIENT,  DONOR with Ureteral Stent Placement;  Surgeon: Dorian Johnson MD;  Location: [facility] OR      Allergies   Allergen Reactions     Contrast Dye Rash     CT contrast allergy 19 rash over eyes. Need to have pre medication before a CT WITH CONTRAST      Diatrizoate Rash     CT contrast allergy 19 rash over eyes. Need to have pre medication before a CT WITH CONTRAST      Lisinopril Swelling     angioedema     Nitrous Oxide Other (See Comments)     Sense of doom     Chlorhexidine Rash     Rash at site     Sulfa Antibiotics Rash     Muscle stiffness of neck     Dapsone      Methemoglobinemia     Furosemide Other (See Comments)     Skin flushing     Metrogel [Metronidazole]      Hives diffusely on body after vaginal administration.     Azithromycin Dizziness and Rash     Cefuroxime Rash      Social History     Tobacco Use     Smoking status: Never     Smokeless tobacco: Never   Vaping Use     Vaping status: Never Used   Substance Use Topics     Alcohol use: No     Alcohol/week: 0.0 standard drinks of alcohol      Wt Readings from Last 1 Encounters:   23 78.7 kg (173 lb 8 oz)        Anesthesia Evaluation   Pt has had prior anesthetic.     History of anesthetic complications   reports having pre-op anesthesia in the OR after being \"given gas\" without any prior sedation or anxiety medication.  The OR staff apparently wasn't ready to proceed so she got anxious when she felt the gas was given too early and she seemed to be too aler.    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  Also notes she can walk several blocks on a flat surface.    Notes she does get some SOB with heavier exertion due to being overweight.   Hematologic:     (+) History of blood clots, pt is not anticoagulated, anemia, history of blood transfusion, no previous transfusion reaction,     Musculoskeletal: Comment: Reports that has been getting some joint pain in the knees and shoulders secondary to her high calcium levels.      GI/Hepatic:     (+) GERD, Asymptomatic on medication,     Renal/Genitourinary: Comment: IgA nephropathy    (+) renal disease, Pt does not require dialysis, Pt has history of transplant, date: 8/3/2019,     Endo: Comment: hyperparathyroidism    (+) thyroid problem, hypothyroidism,     Psychiatric/Substance Use:     (+) psychiatric history anxiety     Infectious Disease:  - neg infectious disease ROS  (-) Recent Fever   Malignancy:  - neg malignancy ROS     Other: Comment:  at 19 weeks gestation     (+) Possibly pregnant, ,         Physical Exam    Airway      Comment: Smaller mouth, micrognathia     Mallampati: II   TM distance: < 3 FB   Neck ROM: full   Mouth opening: > 3 cm    Respiratory Devices and Support         Dental       (+) Minor Abnormalities - some fillings, tiny chips      Cardiovascular   cardiovascular exam normal          Pulmonary   pulmonary exam normal            Other findings: 19w pregnant    OUTSIDE LABS:  CBC:   Lab Results   Component Value Date    WBC 8.7 2023    WBC 9.2  04/10/2023    HGB 9.8 (L) 04/25/2023    HGB 10.1 (L) 04/10/2023    HCT 30.5 (L) 04/25/2023    HCT 30.2 (L) 04/10/2023     04/25/2023     04/10/2023     BMP:   Lab Results   Component Value Date     04/25/2023     04/10/2023    POTASSIUM 4.1 04/25/2023    POTASSIUM 4.1 04/10/2023    CHLORIDE 107 04/25/2023    CHLORIDE 105 04/10/2023    CO2 21 (L) 04/25/2023    CO2 22 04/10/2023    BUN 18.4 04/25/2023    BUN 18.0 04/10/2023    CR 0.94 04/25/2023    CR 0.92 04/10/2023    GLC 92 04/25/2023    GLC 91 04/10/2023     COAGS:   Lab Results   Component Value Date    PTT 33 08/02/2019    INR 1.02 03/22/2023    FIBR 311 02/21/2017     POC:   Lab Results   Component Value Date     (H) 11/23/2020    HCG Positive (A) 01/16/2023    HCGS Negative 06/22/2022     HEPATIC:   Lab Results   Component Value Date    ALBUMIN 4.4 06/22/2022    PROTTOTAL 8.0 06/22/2022    ALT 10 06/22/2022    AST 16 06/22/2022    ALKPHOS 110 06/22/2022    BILITOTAL 0.9 06/22/2022    BILIDIRECT 0.2 12/03/2013     OTHER:   Lab Results   Component Value Date    PH 7.38 04/25/2023    LACT 1.7 01/23/2020    A1C 5.2 02/06/2023    SHAMIR 10.6 (H) 04/25/2023    SHAMIR 10.6 (H) 04/25/2023    PHOS 2.1 (L) 06/01/2022    MAG 1.6 03/03/2021    LIPASE 39 06/22/2022    TSH 2.00 04/25/2023    T4 1.03 04/25/2023    T3 207 (H) 02/28/2023    CRP 26.0 (H) 01/23/2020    SED 29 (H) 01/23/2020       Anesthesia Plan    ASA Status:  3   NPO Status:  ELEVATED Aspiration Risk/Unknown    Anesthesia Type: General.     - Airway: ETT   Induction: Intravenous.   Maintenance: Balanced.   Techniques and Equipment:     - Airway: Video-Laryngoscope     - Lines/Monitors: BIS     Consents    Anesthesia Plan(s) and associated risks, benefits, and realistic alternatives discussed. Questions answered and patient/representative(s) expressed understanding.    - Discussed:     - Discussed with:  Patient, Spouse      - Extended Intubation/Ventilatory Support Discussed: No.       - Patient is DNR/DNI Status: No    Use of blood products discussed: No .     Postoperative Care    Pain management: Multi-modal analgesia, IV analgesics, Oral pain medications.   PONV prophylaxis: Background Propofol Infusion, Dexamethasone or Solumedrol, Ondansetron (or other 5HT-3)     Comments:    Other Comments: NIM tube  Alejandro gtt              Vanessa Negro MD

## 2023-04-28 ENCOUNTER — ANESTHESIA (OUTPATIENT)
Dept: SURGERY | Facility: CLINIC | Age: 33
End: 2023-04-28
Payer: MEDICARE

## 2023-04-28 ENCOUNTER — HOSPITAL ENCOUNTER (OUTPATIENT)
Facility: CLINIC | Age: 33
Setting detail: OBSERVATION
Discharge: HOME | End: 2023-04-29
Attending: SURGERY | Admitting: SURGERY
Payer: MEDICARE

## 2023-04-28 ENCOUNTER — TELEPHONE (OUTPATIENT)
Dept: ENDOCRINOLOGY | Facility: CLINIC | Age: 33
End: 2023-04-28

## 2023-04-28 DIAGNOSIS — E21.2 TERTIARY HYPERPARATHYROIDISM (H): Primary | ICD-10-CM

## 2023-04-28 DIAGNOSIS — E58 CALCIUM DEFICIENCY: Primary | ICD-10-CM

## 2023-04-28 LAB
ALP SERPL-CCNC: 51 U/L (ref 35–104)
CA-I BLD-MCNC: 5.4 MG/DL (ref 4.4–5.2)
CA-I BLD-MCNC: 5.5 MG/DL (ref 4.4–5.2)
GLUCOSE BLDC GLUCOMTR-MCNC: 106 MG/DL (ref 70–99)
MAGNESIUM SERPL-MCNC: 1.4 MG/DL (ref 1.7–2.3)
MAGNESIUM SERPL-MCNC: 2.7 MG/DL (ref 1.7–2.3)
PHOSPHATE SERPL-MCNC: 3.4 MG/DL (ref 2.5–4.5)
PTH-INTACT SERPL-MCNC: 13 PG/ML (ref 15–65)
PTH-INTACT SERPL-MCNC: 19 PG/ML (ref 15–65)

## 2023-04-28 PROCEDURE — 250N000013 HC RX MED GY IP 250 OP 250 PS 637: Performed by: SURGERY

## 2023-04-28 PROCEDURE — 88331 PATH CONSLTJ SURG 1 BLK 1SPC: CPT | Mod: 26 | Performed by: STUDENT IN AN ORGANIZED HEALTH CARE EDUCATION/TRAINING PROGRAM

## 2023-04-28 PROCEDURE — 88305 TISSUE EXAM BY PATHOLOGIST: CPT | Mod: 26 | Performed by: STUDENT IN AN ORGANIZED HEALTH CARE EDUCATION/TRAINING PROGRAM

## 2023-04-28 PROCEDURE — 710N000010 HC RECOVERY PHASE 1, LEVEL 2, PER MIN: Performed by: SURGERY

## 2023-04-28 PROCEDURE — 250N000012 HC RX MED GY IP 250 OP 636 PS 637: Performed by: SURGERY

## 2023-04-28 PROCEDURE — 250N000025 HC SEVOFLURANE, PER MIN: Performed by: SURGERY

## 2023-04-28 PROCEDURE — 250N000011 HC RX IP 250 OP 636: Performed by: STUDENT IN AN ORGANIZED HEALTH CARE EDUCATION/TRAINING PROGRAM

## 2023-04-28 PROCEDURE — 83735 ASSAY OF MAGNESIUM: CPT | Performed by: SURGERY

## 2023-04-28 PROCEDURE — 99222 1ST HOSP IP/OBS MODERATE 55: CPT | Mod: GC | Performed by: INTERNAL MEDICINE

## 2023-04-28 PROCEDURE — 250N000013 HC RX MED GY IP 250 OP 250 PS 637: Performed by: STUDENT IN AN ORGANIZED HEALTH CARE EDUCATION/TRAINING PROGRAM

## 2023-04-28 PROCEDURE — 84075 ASSAY ALKALINE PHOSPHATASE: CPT | Performed by: STUDENT IN AN ORGANIZED HEALTH CARE EDUCATION/TRAINING PROGRAM

## 2023-04-28 PROCEDURE — 96374 THER/PROPH/DIAG INJ IV PUSH: CPT

## 2023-04-28 PROCEDURE — 99207 PR NO BILLABLE SERVICE THIS VISIT: CPT | Performed by: STUDENT IN AN ORGANIZED HEALTH CARE EDUCATION/TRAINING PROGRAM

## 2023-04-28 PROCEDURE — 84100 ASSAY OF PHOSPHORUS: CPT | Performed by: SURGERY

## 2023-04-28 PROCEDURE — 120N000002 HC R&B MED SURG/OB UMMC

## 2023-04-28 PROCEDURE — 83970 ASSAY OF PARATHORMONE: CPT | Performed by: STUDENT IN AN ORGANIZED HEALTH CARE EDUCATION/TRAINING PROGRAM

## 2023-04-28 PROCEDURE — 36415 COLL VENOUS BLD VENIPUNCTURE: CPT | Performed by: SURGERY

## 2023-04-28 PROCEDURE — 60500 EXPLORE PARATHYROID GLANDS: CPT | Mod: GC | Performed by: SURGERY

## 2023-04-28 PROCEDURE — 250N000009 HC RX 250: Performed by: STUDENT IN AN ORGANIZED HEALTH CARE EDUCATION/TRAINING PROGRAM

## 2023-04-28 PROCEDURE — 360N000077 HC SURGERY LEVEL 4, PER MIN: Performed by: SURGERY

## 2023-04-28 PROCEDURE — 272N000001 HC OR GENERAL SUPPLY STERILE: Performed by: SURGERY

## 2023-04-28 PROCEDURE — 88305 TISSUE EXAM BY PATHOLOGIST: CPT | Mod: TC | Performed by: SURGERY

## 2023-04-28 PROCEDURE — 83970 ASSAY OF PARATHORMONE: CPT | Performed by: SURGERY

## 2023-04-28 PROCEDURE — 258N000003 HC RX IP 258 OP 636: Performed by: STUDENT IN AN ORGANIZED HEALTH CARE EDUCATION/TRAINING PROGRAM

## 2023-04-28 PROCEDURE — 999N000141 HC STATISTIC PRE-PROCEDURE NURSING ASSESSMENT: Performed by: SURGERY

## 2023-04-28 PROCEDURE — 250N000011 HC RX IP 250 OP 636: Performed by: SURGERY

## 2023-04-28 PROCEDURE — 370N000017 HC ANESTHESIA TECHNICAL FEE, PER MIN: Performed by: SURGERY

## 2023-04-28 PROCEDURE — 272N000002 HC OR SUPPLY OTHER OPNP: Performed by: SURGERY

## 2023-04-28 PROCEDURE — 82330 ASSAY OF CALCIUM: CPT | Performed by: SURGERY

## 2023-04-28 RX ORDER — ONDANSETRON 2 MG/ML
4 INJECTION INTRAMUSCULAR; INTRAVENOUS EVERY 30 MIN PRN
Status: DISCONTINUED | OUTPATIENT
Start: 2023-04-28 | End: 2023-04-28 | Stop reason: HOSPADM

## 2023-04-28 RX ORDER — ACETAMINOPHEN 325 MG/1
975 TABLET ORAL ONCE
Status: DISCONTINUED | OUTPATIENT
Start: 2023-04-28 | End: 2023-04-28 | Stop reason: HOSPADM

## 2023-04-28 RX ORDER — HYDROCODONE BITARTRATE AND ACETAMINOPHEN 5; 325 MG/1; MG/1
1 TABLET ORAL
Status: CANCELLED | OUTPATIENT
Start: 2023-04-28

## 2023-04-28 RX ORDER — ACETAMINOPHEN 325 MG/1
650 TABLET ORAL
Status: CANCELLED | OUTPATIENT
Start: 2023-04-28

## 2023-04-28 RX ORDER — LORAZEPAM 2 MG/ML
.5-1 INJECTION INTRAMUSCULAR
Status: DISCONTINUED | OUTPATIENT
Start: 2023-04-28 | End: 2023-04-28 | Stop reason: HOSPADM

## 2023-04-28 RX ORDER — SODIUM CHLORIDE, SODIUM LACTATE, POTASSIUM CHLORIDE, CALCIUM CHLORIDE 600; 310; 30; 20 MG/100ML; MG/100ML; MG/100ML; MG/100ML
INJECTION, SOLUTION INTRAVENOUS CONTINUOUS
Status: DISCONTINUED | OUTPATIENT
Start: 2023-04-28 | End: 2023-04-28 | Stop reason: HOSPADM

## 2023-04-28 RX ORDER — HYDROXYZINE HYDROCHLORIDE 25 MG/1
25 TABLET, FILM COATED ORAL EVERY 6 HOURS PRN
Status: DISCONTINUED | OUTPATIENT
Start: 2023-04-28 | End: 2023-04-28 | Stop reason: HOSPADM

## 2023-04-28 RX ORDER — CALCITRIOL 0.25 UG/1
0.25 CAPSULE, LIQUID FILLED ORAL 3 TIMES DAILY
Qty: 60 CAPSULE | Refills: 0 | Status: CANCELLED | OUTPATIENT
Start: 2023-04-28

## 2023-04-28 RX ORDER — FENTANYL CITRATE 50 UG/ML
25 INJECTION, SOLUTION INTRAMUSCULAR; INTRAVENOUS EVERY 5 MIN PRN
Status: DISCONTINUED | OUTPATIENT
Start: 2023-04-28 | End: 2023-04-28 | Stop reason: HOSPADM

## 2023-04-28 RX ORDER — FENTANYL CITRATE 50 UG/ML
25 INJECTION, SOLUTION INTRAMUSCULAR; INTRAVENOUS
Status: DISCONTINUED | OUTPATIENT
Start: 2023-04-28 | End: 2023-04-28 | Stop reason: HOSPADM

## 2023-04-28 RX ORDER — HYDROCODONE BITARTRATE AND ACETAMINOPHEN 5; 325 MG/1; MG/1
1 TABLET ORAL EVERY 8 HOURS PRN
Qty: 10 TABLET | Refills: 0 | Status: SHIPPED | OUTPATIENT
Start: 2023-04-28 | End: 2023-04-29

## 2023-04-28 RX ORDER — ACETAMINOPHEN 325 MG/1
975 TABLET ORAL ONCE
Status: DISCONTINUED | OUTPATIENT
Start: 2023-04-28 | End: 2023-04-28

## 2023-04-28 RX ORDER — ACETAMINOPHEN 325 MG/1
975 TABLET ORAL EVERY 8 HOURS
Status: DISCONTINUED | OUTPATIENT
Start: 2023-04-28 | End: 2023-04-29 | Stop reason: HOSPADM

## 2023-04-28 RX ORDER — ASPIRIN 81 MG/1
81 TABLET, CHEWABLE ORAL DAILY
Status: DISCONTINUED | OUTPATIENT
Start: 2023-04-28 | End: 2023-04-29 | Stop reason: HOSPADM

## 2023-04-28 RX ORDER — ONDANSETRON 4 MG/1
4 TABLET, ORALLY DISINTEGRATING ORAL EVERY 6 HOURS PRN
Status: DISCONTINUED | OUTPATIENT
Start: 2023-04-28 | End: 2023-04-29 | Stop reason: HOSPADM

## 2023-04-28 RX ORDER — FENTANYL CITRATE 50 UG/ML
INJECTION, SOLUTION INTRAMUSCULAR; INTRAVENOUS PRN
Status: DISCONTINUED | OUTPATIENT
Start: 2023-04-28 | End: 2023-04-28

## 2023-04-28 RX ORDER — BISACODYL 10 MG
10 SUPPOSITORY, RECTAL RECTAL DAILY PRN
Status: DISCONTINUED | OUTPATIENT
Start: 2023-04-28 | End: 2023-04-29 | Stop reason: HOSPADM

## 2023-04-28 RX ORDER — ONDANSETRON 2 MG/ML
INJECTION INTRAMUSCULAR; INTRAVENOUS PRN
Status: DISCONTINUED | OUTPATIENT
Start: 2023-04-28 | End: 2023-04-28

## 2023-04-28 RX ORDER — HALOPERIDOL 5 MG/ML
1 INJECTION INTRAMUSCULAR
Status: DISCONTINUED | OUTPATIENT
Start: 2023-04-28 | End: 2023-04-28 | Stop reason: HOSPADM

## 2023-04-28 RX ORDER — POLYETHYLENE GLYCOL 3350 17 G/17G
17 POWDER, FOR SOLUTION ORAL DAILY
Status: DISCONTINUED | OUTPATIENT
Start: 2023-04-29 | End: 2023-04-29 | Stop reason: HOSPADM

## 2023-04-28 RX ORDER — FENTANYL CITRATE 50 UG/ML
50 INJECTION, SOLUTION INTRAMUSCULAR; INTRAVENOUS EVERY 5 MIN PRN
Status: DISCONTINUED | OUTPATIENT
Start: 2023-04-28 | End: 2023-04-28 | Stop reason: HOSPADM

## 2023-04-28 RX ORDER — LIDOCAINE HYDROCHLORIDE 20 MG/ML
INJECTION, SOLUTION INFILTRATION; PERINEURAL PRN
Status: DISCONTINUED | OUTPATIENT
Start: 2023-04-28 | End: 2023-04-28

## 2023-04-28 RX ORDER — CITRIC ACID/SODIUM CITRATE 334-500MG
15 SOLUTION, ORAL ORAL
Status: COMPLETED | OUTPATIENT
Start: 2023-04-28 | End: 2023-04-28

## 2023-04-28 RX ORDER — ONDANSETRON 2 MG/ML
4 INJECTION INTRAMUSCULAR; INTRAVENOUS EVERY 6 HOURS PRN
Status: DISCONTINUED | OUTPATIENT
Start: 2023-04-28 | End: 2023-04-29 | Stop reason: HOSPADM

## 2023-04-28 RX ORDER — CALCIUM CARBONATE 500 MG/1
2 TABLET, CHEWABLE ORAL 3 TIMES DAILY
Qty: 120 TABLET | Refills: 0 | Status: CANCELLED | OUTPATIENT
Start: 2023-04-28

## 2023-04-28 RX ORDER — AMOXICILLIN 250 MG
1 CAPSULE ORAL 2 TIMES DAILY
Status: DISCONTINUED | OUTPATIENT
Start: 2023-04-28 | End: 2023-04-28

## 2023-04-28 RX ORDER — DEXAMETHASONE SODIUM PHOSPHATE 4 MG/ML
4 INJECTION, SOLUTION INTRA-ARTICULAR; INTRALESIONAL; INTRAMUSCULAR; INTRAVENOUS; SOFT TISSUE
Status: DISCONTINUED | OUTPATIENT
Start: 2023-04-28 | End: 2023-04-28 | Stop reason: HOSPADM

## 2023-04-28 RX ORDER — ACETAMINOPHEN 325 MG/1
650 TABLET ORAL EVERY 4 HOURS PRN
Status: DISCONTINUED | OUTPATIENT
Start: 2023-05-01 | End: 2023-04-29 | Stop reason: HOSPADM

## 2023-04-28 RX ORDER — OXYCODONE HYDROCHLORIDE 5 MG/1
10 TABLET ORAL
Status: DISCONTINUED | OUTPATIENT
Start: 2023-04-28 | End: 2023-04-28 | Stop reason: HOSPADM

## 2023-04-28 RX ORDER — MAGNESIUM SULFATE HEPTAHYDRATE 40 MG/ML
4 INJECTION, SOLUTION INTRAVENOUS ONCE
Status: COMPLETED | OUTPATIENT
Start: 2023-04-28 | End: 2023-04-28

## 2023-04-28 RX ORDER — ONDANSETRON 4 MG/1
4 TABLET, ORALLY DISINTEGRATING ORAL EVERY 30 MIN PRN
Status: DISCONTINUED | OUTPATIENT
Start: 2023-04-28 | End: 2023-04-28 | Stop reason: HOSPADM

## 2023-04-28 RX ORDER — NALOXONE HYDROCHLORIDE 0.4 MG/ML
0.4 INJECTION, SOLUTION INTRAMUSCULAR; INTRAVENOUS; SUBCUTANEOUS
Status: DISCONTINUED | OUTPATIENT
Start: 2023-04-28 | End: 2023-04-29 | Stop reason: HOSPADM

## 2023-04-28 RX ORDER — NALOXONE HYDROCHLORIDE 0.4 MG/ML
0.2 INJECTION, SOLUTION INTRAMUSCULAR; INTRAVENOUS; SUBCUTANEOUS
Status: DISCONTINUED | OUTPATIENT
Start: 2023-04-28 | End: 2023-04-29 | Stop reason: HOSPADM

## 2023-04-28 RX ORDER — LIDOCAINE 40 MG/G
CREAM TOPICAL
Status: DISCONTINUED | OUTPATIENT
Start: 2023-04-28 | End: 2023-04-29 | Stop reason: HOSPADM

## 2023-04-28 RX ORDER — HYDRALAZINE HYDROCHLORIDE 20 MG/ML
2.5-5 INJECTION INTRAMUSCULAR; INTRAVENOUS EVERY 10 MIN PRN
Status: DISCONTINUED | OUTPATIENT
Start: 2023-04-28 | End: 2023-04-28 | Stop reason: HOSPADM

## 2023-04-28 RX ORDER — DEXAMETHASONE SODIUM PHOSPHATE 4 MG/ML
INJECTION, SOLUTION INTRA-ARTICULAR; INTRALESIONAL; INTRAMUSCULAR; INTRAVENOUS; SOFT TISSUE PRN
Status: DISCONTINUED | OUTPATIENT
Start: 2023-04-28 | End: 2023-04-28

## 2023-04-28 RX ORDER — MEPERIDINE HYDROCHLORIDE 25 MG/ML
12.5 INJECTION INTRAMUSCULAR; INTRAVENOUS; SUBCUTANEOUS EVERY 5 MIN PRN
Status: DISCONTINUED | OUTPATIENT
Start: 2023-04-28 | End: 2023-04-28 | Stop reason: HOSPADM

## 2023-04-28 RX ORDER — AZATHIOPRINE 50 MG/1
150 TABLET ORAL EVERY EVENING
Status: DISCONTINUED | OUTPATIENT
Start: 2023-04-28 | End: 2023-04-29 | Stop reason: HOSPADM

## 2023-04-28 RX ORDER — CALCIUM CARBONATE 500 MG/1
1000 TABLET, CHEWABLE ORAL 3 TIMES DAILY
Status: CANCELLED | OUTPATIENT
Start: 2023-04-28

## 2023-04-28 RX ORDER — CALCITRIOL 0.25 UG/1
0.25 CAPSULE, LIQUID FILLED ORAL 3 TIMES DAILY
Qty: 90 CAPSULE | Refills: 0 | Status: SHIPPED | OUTPATIENT
Start: 2023-04-28 | End: 2023-05-03

## 2023-04-28 RX ORDER — PROCHLORPERAZINE MALEATE 10 MG
10 TABLET ORAL EVERY 6 HOURS PRN
Status: DISCONTINUED | OUTPATIENT
Start: 2023-04-28 | End: 2023-04-29 | Stop reason: HOSPADM

## 2023-04-28 RX ORDER — CALCITRIOL 0.25 UG/1
0.25 CAPSULE, LIQUID FILLED ORAL 3 TIMES DAILY
Status: DISCONTINUED | OUTPATIENT
Start: 2023-04-28 | End: 2023-04-29 | Stop reason: HOSPADM

## 2023-04-28 RX ORDER — PROPOFOL 10 MG/ML
INJECTION, EMULSION INTRAVENOUS PRN
Status: DISCONTINUED | OUTPATIENT
Start: 2023-04-28 | End: 2023-04-28

## 2023-04-28 RX ORDER — HYDROMORPHONE HYDROCHLORIDE 1 MG/ML
0.4 INJECTION, SOLUTION INTRAMUSCULAR; INTRAVENOUS; SUBCUTANEOUS EVERY 5 MIN PRN
Status: DISCONTINUED | OUTPATIENT
Start: 2023-04-28 | End: 2023-04-28 | Stop reason: HOSPADM

## 2023-04-28 RX ORDER — DIMENHYDRINATE 50 MG/ML
25 INJECTION, SOLUTION INTRAMUSCULAR; INTRAVENOUS
Status: DISCONTINUED | OUTPATIENT
Start: 2023-04-28 | End: 2023-04-28 | Stop reason: HOSPADM

## 2023-04-28 RX ORDER — ALBUTEROL SULFATE 0.83 MG/ML
2.5 SOLUTION RESPIRATORY (INHALATION) EVERY 4 HOURS PRN
Status: DISCONTINUED | OUTPATIENT
Start: 2023-04-28 | End: 2023-04-28 | Stop reason: HOSPADM

## 2023-04-28 RX ORDER — FAMOTIDINE 20 MG/1
20 TABLET, FILM COATED ORAL DAILY PRN
Status: DISCONTINUED | OUTPATIENT
Start: 2023-04-28 | End: 2023-04-29 | Stop reason: HOSPADM

## 2023-04-28 RX ORDER — LIDOCAINE 40 MG/G
CREAM TOPICAL
Status: DISCONTINUED | OUTPATIENT
Start: 2023-04-28 | End: 2023-04-28 | Stop reason: HOSPADM

## 2023-04-28 RX ORDER — OXYCODONE HYDROCHLORIDE 5 MG/1
5 TABLET ORAL EVERY 4 HOURS PRN
Status: DISCONTINUED | OUTPATIENT
Start: 2023-04-28 | End: 2023-04-29 | Stop reason: HOSPADM

## 2023-04-28 RX ORDER — HYDROMORPHONE HYDROCHLORIDE 1 MG/ML
0.2 INJECTION, SOLUTION INTRAMUSCULAR; INTRAVENOUS; SUBCUTANEOUS EVERY 5 MIN PRN
Status: DISCONTINUED | OUTPATIENT
Start: 2023-04-28 | End: 2023-04-28 | Stop reason: HOSPADM

## 2023-04-28 RX ORDER — CALCIUM CARBONATE 1250 MG/5ML
1250 SUSPENSION ORAL 2 TIMES DAILY
Status: DISCONTINUED | OUTPATIENT
Start: 2023-04-28 | End: 2023-04-29 | Stop reason: HOSPADM

## 2023-04-28 RX ORDER — LABETALOL HYDROCHLORIDE 5 MG/ML
10 INJECTION, SOLUTION INTRAVENOUS
Status: DISCONTINUED | OUTPATIENT
Start: 2023-04-28 | End: 2023-04-28 | Stop reason: HOSPADM

## 2023-04-28 RX ORDER — ACETAMINOPHEN 325 MG/1
975 TABLET ORAL ONCE
Status: COMPLETED | OUTPATIENT
Start: 2023-04-28 | End: 2023-04-28

## 2023-04-28 RX ORDER — OXYCODONE HYDROCHLORIDE 5 MG/1
5 TABLET ORAL
Status: DISCONTINUED | OUTPATIENT
Start: 2023-04-28 | End: 2023-04-28 | Stop reason: HOSPADM

## 2023-04-28 RX ORDER — PHENYLEPHRINE HCL IN 0.9% NACL 50MG/250ML
PLASTIC BAG, INJECTION (ML) INTRAVENOUS CONTINUOUS PRN
Status: DISCONTINUED | OUTPATIENT
Start: 2023-04-28 | End: 2023-04-28

## 2023-04-28 RX ORDER — KETOROLAC TROMETHAMINE 30 MG/ML
15 INJECTION, SOLUTION INTRAMUSCULAR; INTRAVENOUS
Status: DISCONTINUED | OUTPATIENT
Start: 2023-04-28 | End: 2023-04-28 | Stop reason: HOSPADM

## 2023-04-28 RX ORDER — LEVOTHYROXINE SODIUM 25 UG/1
25 TABLET ORAL EVERY MORNING
Status: DISCONTINUED | OUTPATIENT
Start: 2023-04-29 | End: 2023-04-28

## 2023-04-28 RX ORDER — TACROLIMUS 1 MG/1
4 CAPSULE ORAL EVERY EVENING
Status: DISCONTINUED | OUTPATIENT
Start: 2023-04-28 | End: 2023-04-29 | Stop reason: HOSPADM

## 2023-04-28 RX ORDER — PHENYLEPHRINE HCL IN 0.9% NACL 50MG/250ML
.5-1.25 PLASTIC BAG, INJECTION (ML) INTRAVENOUS CONTINUOUS
Status: DISCONTINUED | OUTPATIENT
Start: 2023-04-28 | End: 2023-04-28

## 2023-04-28 RX ORDER — DEXAMETHASONE SODIUM PHOSPHATE 4 MG/ML
10 INJECTION, SOLUTION INTRA-ARTICULAR; INTRALESIONAL; INTRAMUSCULAR; INTRAVENOUS; SOFT TISSUE ONCE
Status: DISCONTINUED | OUTPATIENT
Start: 2023-04-28 | End: 2023-04-28 | Stop reason: HOSPADM

## 2023-04-28 RX ORDER — DOCUSATE SODIUM 100 MG/1
100 CAPSULE, LIQUID FILLED ORAL 2 TIMES DAILY
Status: DISCONTINUED | OUTPATIENT
Start: 2023-04-28 | End: 2023-04-29 | Stop reason: HOSPADM

## 2023-04-28 RX ADMIN — FENTANYL CITRATE 50 MCG: 50 INJECTION, SOLUTION INTRAMUSCULAR; INTRAVENOUS at 08:32

## 2023-04-28 RX ADMIN — DOCUSATE SODIUM 100 MG: 100 CAPSULE, LIQUID FILLED ORAL at 20:17

## 2023-04-28 RX ADMIN — ACETAMINOPHEN 975 MG: 325 TABLET, FILM COATED ORAL at 13:06

## 2023-04-28 RX ADMIN — FENTANYL CITRATE 50 MCG: 50 INJECTION, SOLUTION INTRAMUSCULAR; INTRAVENOUS at 08:48

## 2023-04-28 RX ADMIN — OXYCODONE HYDROCHLORIDE 5 MG: 5 TABLET ORAL at 13:16

## 2023-04-28 RX ADMIN — SODIUM CHLORIDE, POTASSIUM CHLORIDE, SODIUM LACTATE AND CALCIUM CHLORIDE: 600; 310; 30; 20 INJECTION, SOLUTION INTRAVENOUS at 08:24

## 2023-04-28 RX ADMIN — MIDAZOLAM 1 MG: 1 INJECTION INTRAMUSCULAR; INTRAVENOUS at 08:24

## 2023-04-28 RX ADMIN — Medication 1 LOZENGE: at 11:32

## 2023-04-28 RX ADMIN — ACETAMINOPHEN 325MG 975 MG: 325 TABLET ORAL at 06:30

## 2023-04-28 RX ADMIN — FENTANYL CITRATE 50 MCG: 50 INJECTION, SOLUTION INTRAMUSCULAR; INTRAVENOUS at 09:00

## 2023-04-28 RX ADMIN — FENTANYL CITRATE 50 MCG: 50 INJECTION, SOLUTION INTRAMUSCULAR; INTRAVENOUS at 08:35

## 2023-04-28 RX ADMIN — SODIUM CITRATE AND CITRIC ACID MONOHYDRATE 15 ML: 500; 334 SOLUTION ORAL at 07:36

## 2023-04-28 RX ADMIN — MIDAZOLAM 1 MG: 1 INJECTION INTRAMUSCULAR; INTRAVENOUS at 08:28

## 2023-04-28 RX ADMIN — FENTANYL CITRATE 50 MCG/HR: 50 INJECTION, SOLUTION INTRAMUSCULAR; INTRAVENOUS at 09:00

## 2023-04-28 RX ADMIN — PHENYLEPHRINE HYDROCHLORIDE 0.6 MCG/KG/MIN: 10 INJECTION INTRAVENOUS at 09:14

## 2023-04-28 RX ADMIN — AZATHIOPRINE 150 MG: 50 TABLET ORAL at 22:16

## 2023-04-28 RX ADMIN — Medication 0.5 MCG/KG/MIN: at 08:44

## 2023-04-28 RX ADMIN — CALCITRIOL 0.25 MCG: 0.25 CAPSULE ORAL at 17:46

## 2023-04-28 RX ADMIN — SUCCINYLCHOLINE CHLORIDE 80 MG: 20 INJECTION, SOLUTION INTRAMUSCULAR; INTRAVENOUS; PARENTERAL at 08:32

## 2023-04-28 RX ADMIN — FENTANYL CITRATE 50 MCG: 50 INJECTION, SOLUTION INTRAMUSCULAR; INTRAVENOUS at 09:50

## 2023-04-28 RX ADMIN — DEXAMETHASONE SODIUM PHOSPHATE 10 MG: 4 INJECTION, SOLUTION INTRA-ARTICULAR; INTRALESIONAL; INTRAMUSCULAR; INTRAVENOUS; SOFT TISSUE at 08:33

## 2023-04-28 RX ADMIN — PHENYLEPHRINE HYDROCHLORIDE 100 MCG: 10 INJECTION INTRAVENOUS at 09:07

## 2023-04-28 RX ADMIN — OXYCODONE HYDROCHLORIDE 5 MG: 5 TABLET ORAL at 18:47

## 2023-04-28 RX ADMIN — ACETAMINOPHEN 975 MG: 325 TABLET, FILM COATED ORAL at 20:17

## 2023-04-28 RX ADMIN — TACROLIMUS 4 MG: 1 CAPSULE ORAL at 22:16

## 2023-04-28 RX ADMIN — FENTANYL CITRATE 50 MCG: 50 INJECTION, SOLUTION INTRAMUSCULAR; INTRAVENOUS at 10:04

## 2023-04-28 RX ADMIN — PROPOFOL 100 MG: 10 INJECTION, EMULSION INTRAVENOUS at 08:35

## 2023-04-28 RX ADMIN — LIDOCAINE HYDROCHLORIDE 60 MG: 20 INJECTION, SOLUTION INFILTRATION; PERINEURAL at 08:32

## 2023-04-28 RX ADMIN — PROPOFOL 200 MG: 10 INJECTION, EMULSION INTRAVENOUS at 08:32

## 2023-04-28 RX ADMIN — CALCIUM CARBONATE 1250 MG: 1250 SUSPENSION ORAL at 20:17

## 2023-04-28 RX ADMIN — FENTANYL CITRATE 50 MCG: 50 INJECTION, SOLUTION INTRAMUSCULAR; INTRAVENOUS at 10:17

## 2023-04-28 RX ADMIN — FENTANYL CITRATE 50 MCG: 50 INJECTION, SOLUTION INTRAMUSCULAR; INTRAVENOUS at 10:12

## 2023-04-28 RX ADMIN — PHENYLEPHRINE HYDROCHLORIDE 100 MCG: 10 INJECTION INTRAVENOUS at 09:20

## 2023-04-28 RX ADMIN — ONDANSETRON 4 MG: 2 INJECTION INTRAMUSCULAR; INTRAVENOUS at 09:59

## 2023-04-28 RX ADMIN — ASPIRIN 81 MG 81 MG: 81 TABLET ORAL at 13:06

## 2023-04-28 RX ADMIN — CALCITRIOL 0.25 MCG: 0.25 CAPSULE ORAL at 20:17

## 2023-04-28 RX ADMIN — MAGNESIUM SULFATE HEPTAHYDRATE 4 G: 40 INJECTION, SOLUTION INTRAVENOUS at 15:09

## 2023-04-28 ASSESSMENT — ACTIVITIES OF DAILY LIVING (ADL)
ADLS_ACUITY_SCORE: 26
CHANGE_IN_FUNCTIONAL_STATUS_SINCE_ONSET_OF_CURRENT_ILLNESS/INJURY: NO
ADLS_ACUITY_SCORE: 26
ADLS_ACUITY_SCORE: 22
ADLS_ACUITY_SCORE: 35
ADLS_ACUITY_SCORE: 26
ADLS_ACUITY_SCORE: 22
TOILETING_ISSUES: NO
DRESSING/BATHING_DIFFICULTY: NO
WALKING_OR_CLIMBING_STAIRS_DIFFICULTY: NO
CONCENTRATING,_REMEMBERING_OR_MAKING_DECISIONS_DIFFICULTY: NO
ADLS_ACUITY_SCORE: 35
WEAR_GLASSES_OR_BLIND: YES
DOING_ERRANDS_INDEPENDENTLY_DIFFICULTY: NO
FALL_HISTORY_WITHIN_LAST_SIX_MONTHS: NO
DIFFICULTY_EATING/SWALLOWING: NO

## 2023-04-28 NOTE — INTERVAL H&P NOTE
I have reviewed the surgical (or preoperative) H&P that is linked to this encounter, and examined the patient. There are no significant changes    Clinical Conditions Present on Arrival:  Clinically Significant Risk Factors Present on Admission          # Hypercalcemia: Highest Ca = 10.6 mg/dL in last 30 days, will monitor as appropriate

## 2023-04-28 NOTE — ANESTHESIA CARE TRANSFER NOTE
Patient: Mona Wagner    Procedure: Procedure(s):  Resection 3 and 1/2 parathyroid glands       Diagnosis: Hyperparathyroidism (H) [E21.3]  Diagnosis Additional Information: No value filed.    Anesthesia Type:   General     Note:    Oropharynx: oropharynx clear of all foreign objects and spontaneously breathing  Level of Consciousness: awake  Oxygen Supplementation: face mask  Level of Supplemental Oxygen (L/min / FiO2): 8  Independent Airway: airway patency satisfactory and stable  Dentition: dentition unchanged  Vital Signs Stable: post-procedure vital signs reviewed and stable  Report to RN Given: handoff report given  Patient transferred to: PACU  Comments: Arrived to PACU with patient. Spontaneous RR on supplemental O2. Monitors applied, VSS, PIV/airway patent, Verbal Report to RN in which all questions/concerns answered.  No nausea. Moderate pain in which total 100 mcg fentanyl IV given once connected to all monitors.   Handoff Report: Identifed the Patient, Identified the Reponsible Provider, Reviewed the pertinent medical history, Discussed the surgical course, Reviewed Intra-OP anesthesia mangement and issues during anesthesia, Set expectations for post-procedure period and Allowed opportunity for questions and acknowledgement of understanding before confirming post procedural orders were in.     Handoff Report: Identifed the Patient, Identified the Reponsible Provider, Reviewed the pertinent medical history, Discussed the surgical course, Reviewed Intra-OP anesthesia mangement and issues during anesthesia, Set expectations for post-procedure period and Allowed opportunity for questions and acknowledgement of understanding      Vitals:  Vitals Value Taken Time   /74 04/28/23 1015   Temp 37.1  C (98.8  F) 04/28/23 1015   Pulse 91 04/28/23 1026   Resp 16 04/28/23 1026   SpO2 97 % 04/28/23 1026   Vitals shown include unvalidated device data.    Electronically Signed By: Vanessa Negro MD  April 28,  2023  10:28 AM

## 2023-04-28 NOTE — CONSULTS
Endocrinology Consult     Mona Wagner MRN:[id number] YOB: 1990  Date of Admission:2023   Primary care provider: Macarena Bowen     Reason for visit: Hyperparathyroidism (H)   Reason for Endocrine consult: History of tertiary hyperparathyroidism s/p 3.5 parathyroidectomy on  Endocrinology consulted for postoperative calcium management.    HPI:  #1 Hypercalcemia due tertiary hyperparathyroidism  Mona Wagner is a 32 year old female  1 para 0 gestational age 19 weeks with PMHx of DVT, seizure disorder, hypothyroidism, ESRD due to IgA nephropathy s/p kidney transplantation , anxiety, and tertiary hyperparathyroidism admitted to the hospital for elective resection of 3.5 parathyroid glands surgery done for her on 2023.    Was diagnosed with ESRD over 10 years ago due to IgA nephropathy developed secondary/tertiary hyperparathyroidism was started on Sensipar since  Sensipar was discontinued when she knew she is pregnant., started to get persistent hypercalcemia since 2022 hypercalcemia in  however it was intermittent, hypercalcemia became persistent since 2022.  Her calcium level was elevated up to 11.2 on 2023.  Most recent calcium level prior to the admission was on  was 10.6.    Has been consistently elevated parathyroid hormone since  at the time of the diagnosis was 1131 following starting on Sensipar for treatment around 200s-300s.  Last parathyroid hormone level check was on 2023 was within normal range 59(inappropriate at the higher range of the normal for hypercalcemia).    Postoperative magnesium level of 1.4, ionized calcium level of 5.5 elevated, most recent ALP in the records was normal 110 in 2022.  No recent phosphorus level.    Following the kidney transplant continue to have normal EGFR above 60.      On assessment today:  She stated she has history of ongoing fatigability intermittent constipation polyuria and  polydipsia.  She also has history of kidney stone was seen in the imaging however she did not get any symptoms.  Was not taking calcitriol prior to the surgery.  After the surgery she feels pain at the site of the surgery.  No muscle cramps.  She felt some numbness/tingling sensation around her mouth in the recovery however the ionized calcium level at that time was 5.5 (high).    #2 hypothyroidism  She has known history of hypothyroidism the most recent thyroid function test on 4/25 free T4 1.03, TSH 2.  Prior to the admission was on levothyroxine 37.5 mcg 6 days/week and 25 mcg on the seventh day (Sundays).    ROS:  All 12 systems were reviewed and negative except as mentioned in HPI    Past Medical/Surgical History:  Past Medical History:   Diagnosis Date     Anemia in chronic kidney disease      History of bacterial endocarditis      History of blood transfusion      History of DVT (deep vein thrombosis) 2014     History of seizure 2014     History of subarachnoid hemorrhage      Hydronephrosis      Hypothyroidism      Kidney transplanted 08/03/2019    DCD DDKT. Induction with thymo 6mg/kg.     Orthostatic hypotension      FATOUMATA (obstructive sleep apnea)      Pyelonephritis of transplanted kidney 01/23/2020     Secondary renal hyperparathyroidism (H)      Urinary tract infection      Past Surgical History:   Procedure Laterality Date     BENCH KIDNEY N/A 8/3/2019    Procedure: BACKBENCH PREPARATION, ALLOGRAFT, KIDNEY;  Surgeon: Dorian Johnson MD;  Location: [facility] OR     COMBINED CYSTOSCOPY, RETROGRADES, EXCHANGE STENT URETER(S) Right 11/23/2020    Procedure: CYSTOSCOPY, CYSOTGRAM, WITH RETROGRADE PYELOGRAM, ureteroscopy,  AND URETERAL STENT;  Surgeon: Wally Britt MD;  Location: [facility] OR     COMBINED CYSTOSCOPY, RETROGRADES, URETEROSCOPY, LASER HOLMIUM LITHOTRIPSY URETER(S), INSERT STENT N/A 12/6/2019    Procedure: CYSTOURETEROSCOPY, WITH RETROGRADE PYELOGRAM of transplant kidney, STENT INSERTION, Laser on  standby;  Surgeon: Wally Britt MD;  Location: [facility] OR     CREATE FISTULA ARTERIOVENOUS UPPER EXTREMITY  2014    Procedure: CREATE FISTULA ARTERIOVENOUS UPPER EXTREMITY;  Left Wrist Arteriovenous Fistula Placement;  Surgeon: Shashi Castro MD;  Location: [facility] OR     CREATE FISTULA ARTERIOVENOUS UPPER EXTREMITY       CYSTOSCOPY, RETROGRADES, INSERT STENT URETER(S), COMBINED N/A 2020    Procedure: CYSTOSCOPY, WITH RETROGRADE PYELOGRAM, CYSTOGRAM AND URETERAL STENT INSERTION - TRANSPLANT KIDNEY;  Surgeon: Wally Britt MD;  Location: [facility]     IR CEREBRAL ANGIOGRAM  2014     PERCUTANEOUS BIOPSY KIDNEY Right 2019    Procedure: Right Kidney Biopsy;  Surgeon: Deny Rios MD;  Location: [facility] OR     RASTA/DIALYSIS CATHETER  12/10/2013          TRANSPLANT KIDNEY RECIPIENT  DONOR N/A 8/3/2019    Procedure: TRANSPLANT, KIDNEY, RECIPIENT,  DONOR with Ureteral Stent Placement;  Surgeon: Dorian Johnson MD;  Location: [facility] OR       Allergies:  Allergies   Allergen Reactions     Contrast Dye Rash     CT contrast allergy 19 rash over eyes. Need to have pre medication before a CT WITH CONTRAST      Diatrizoate Rash     CT contrast allergy 19 rash over eyes. Need to have pre medication before a CT WITH CONTRAST      Lisinopril Swelling     angioedema     Nitrous Oxide Other (See Comments)     Sense of doom     Chlorhexidine Rash     Rash at site     Sulfa Antibiotics Rash     Muscle stiffness of neck     Dapsone      Methemoglobinemia     Furosemide Other (See Comments)     Skin flushing     Metrogel [Metronidazole]      Hives diffusely on body after vaginal administration.     Azithromycin Dizziness and Rash     Cefuroxime Rash       PTA Meds:  Prior to Admission medications    Medication Sig Last Dose Taking? Auth Provider Long Term End Date   acetaminophen (TYLENOL) 325 MG tablet Take 2 tablets (650 mg) by mouth every 4 hours as needed for mild pain Past Week  Yes Macarena Bowen MD No    aspirin (ASA) 81 MG chewable tablet Take 1 tablet (81 mg) by mouth daily Past Week Yes Macarena Bowen MD     azaTHIOprine (IMURAN) 50 MG tablet Take 3 tablets (150 mg) by mouth daily  Patient taking differently: Take 150 mg by mouth every evening 4/27/2023 at 2200 Yes Nakul Hill MD Yes    calcitRIOL (ROCALTROL) 0.25 MCG capsule Take 1 capsule (0.25 mcg) by mouth 3 times daily  Yes Deny Mae MD Yes    famotidine (PEPCID) 20 MG tablet Take 1 tablet (20 mg) by mouth 2 times daily  Patient taking differently: Take 20 mg by mouth as needed Past Month Yes Macarena Bowen MD     HYDROcodone-acetaminophen (NORCO) 5-325 MG tablet Take 1 tablet by mouth every 8 hours as needed for severe pain  Yes Melissa Jones MD No 5/1/23   levothyroxine (SYNTHROID/LEVOTHROID) 25 MCG tablet TAKE 1.5 tablet daily 6 days per week and 1 tablet daily on the 7th day ( will be titrating up)  Patient taking differently: Take 25 mcg by mouth every morning TAKE 1.5 tablet daily 6 days per week and 1 tablet daily on the 7th day ( will be titrating up) 4/27/2023 at 1000 Yes Katrin Lopez MD Yes    polyethylene glycol (MIRALAX) 17 GM/Dose powder Take 17 g (1 capful) by mouth daily as needed for constipation More than a month Yes Brian Kohli MD     Prenatal Vit-Fe Fumarate-FA (PRENATAL MULTIVITAMIN W/IRON) 27-0.8 MG tablet Take 1 tablet by mouth daily  Patient taking differently: Take 1 tablet by mouth every evening 4/27/2023 at 2200 Yes Macarena Bowen MD     simethicone (MYLICON) 125 MG chewable tablet Take 1 tablet (125 mg) by mouth 4 times daily as needed for intestinal gas 4/27/2023 at 2000 Yes Macarena Bowen MD Yes    tacrolimus (GENERIC EQUIVALENT) 0.5 MG capsule Take 1 capsule (0.5 mg) by mouth every morning Total dose = 3.5 mg every AM and 4 mg every PM 4/28/2023 at 1120 Yes Gelacio Mcintyre MD     tacrolimus (GENERIC EQUIVALENT) 1 MG  capsule Total dose = 3.5 mg every AM and 4 mg every PM 4/27/2023 at 2200 Yes Gelacio Mcintyre MD     calcium carbonate 1250 MG/5ML SUSP suspension Take 5 mLs (1,250 mg) by mouth 2 times daily   Katrin Lopez MD No    cyanocobalamin (VITAMIN B-12) 1000 MCG tablet Take 1 tablet (1,000 mcg) by mouth daily  Patient not taking: Reported on 4/26/2023   Sonam Toledo CNM     EPINEPHrine (ANY BX GENERIC EQUIV) 0.3 MG/0.3ML injection 2-pack    Reported, Patient     ferrous sulfate (FEROSUL) 325 (65 Fe) MG tablet Take 1 tablet (325 mg) by mouth daily (with breakfast)  Patient not taking: Reported on 4/26/2023   Sonam Toledo CNM     vitamin C (ASCORBIC ACID) 250 MG tablet Take 1 tablet (250 mg) by mouth daily  Patient not taking: Reported on 4/26/2023   Sonam Toledo CNM          Current Medications:   Current Facility-Administered Medications   Medication     [START ON 5/1/2023] acetaminophen (TYLENOL) tablet 650 mg     acetaminophen (TYLENOL) tablet 975 mg     aspirin (ASA) chewable tablet 81 mg     azaTHIOprine (IMURAN) tablet 150 mg     calcitRIOL (ROCALTROL) capsule 0.25 mcg     calcium carbonate suspension 1,250 mg     famotidine (PEPCID) tablet 20 mg     [START ON 4/29/2023] levothyroxine (SYNTHROID/LEVOTHROID) tablet 25 mcg     lidocaine (LMX4) cream     lidocaine 1 % 0.1-1 mL     naloxone (NARCAN) injection 0.2 mg    Or     naloxone (NARCAN) injection 0.4 mg    Or     naloxone (NARCAN) injection 0.2 mg    Or     naloxone (NARCAN) injection 0.4 mg     oxyCODONE (ROXICODONE) tablet 5 mg     sodium chloride (PF) 0.9% PF flush 3 mL     sodium chloride (PF) 0.9% PF flush 3 mL     [START ON 4/29/2023] tacrolimus (GENERIC EQUIVALENT) capsule 3.5 mg     tacrolimus (GENERIC EQUIVALENT) capsule 4 mg       Family History:  Family History   Problem Relation Age of Onset     Kidney Disease Mother      Kidney failure Mother      Coronary Artery Disease Father      Cerebrovascular Disease Father      Heart  Disease Father      Sleep Apnea Father      Hypertension Sister      Diabetes Sister      Cerebrovascular Disease Sister      Kidney Disease Sister      Breast Cancer No family hx of      Cancer - colorectal No family hx of      Ovarian Cancer No family hx of      Prostate Cancer No family hx of      Other Cancer No family hx of      Asthma No family hx of      Anesthesia Reaction No family hx of      Deep Vein Thrombosis (DVT) No family hx of      Melanoma No family hx of      Skin Cancer No family hx of      Thrombosis No family hx of        Social History:  Social History     Tobacco Use     Smoking status: Never     Smokeless tobacco: Never   Vaping Use     Vaping status: Never Used   Substance Use Topics     Alcohol use: No     Alcohol/week: 0.0 standard drinks of alcohol         Physical Examination:  Blood pressure 133/72, pulse 93, temperature (!) 96.6  F (35.9  C), temperature source Oral, resp. rate 20, height 1.524 m (5'), weight 78.4 kg (172 lb 13.5 oz), last menstrual period 12/16/2022, SpO2 96 %, not currently breastfeeding.   GENERAL APPEARANCE: Alert and no distress  NECK: No lymphadenopathy appreciated - noted midline scar  Thyroid: No obvious nodules palpated   CV: RRR without M/R/G  Lungs: CTA bilaterally  Abdomen: Soft, Nontender, non distended, positive bowel sounds   Neuro: no focal deficits  Skin: No infection in feet   Mood: Normal   Lymph: neg in neck and supraclavicular area          Endocrine Labs:   Latest Reference Range & Units Most Recent   Calcium 8.6 - 10.0 mg/dL  8.6 - 10.0 mg/dL 10.6 (H)  4/25/23 10:35  10.6 (H)  4/25/23 10:35   Magnesium 1.7 - 2.3 mg/dL 1.4 (L)  4/28/23 11:30   Phosphorus 2.5 - 4.5 mg/dL 2.1 (L)  6/1/22 10:20   Vitamin D Deficiency screening 20 - 75 ug/L 14 (L)  7/13/22 12:20   Parathyroid Hormone Intact Intraoperative 15 - 65 pg/mL 19  4/28/23 09:51   Parathyroid Hormone Intact 15 - 65 pg/mL 59  2/6/23 10:23     Ultrasound bilateral thyroid gland on  3/22/2023:  FINDINGS:  RIGHT lobe: 4.6 x 1.5 x 1.6  cm. Homogeneous echotexture.  Isthmus: 3  mm.  LEFT lobe: 4.1 x 1.6 x 1.2  cm. Homogeneous echotexture.     NECK: No cervical lymphadenopathy.     NODULES: No thyroid nodules.     There is a 9 mm x 6 mm x 4 mm hypoechoic structure deep to the mid left thyroid lobe                                                                         IMPRESSION:  1.  Indeterminate 9 mm hypoechoic structure deep to the mid left thyroid lobe. A parathyroid gland and/or lesion is possible.         Assessment and Plan:   Mona Wagner is a 32 year old female  1 para 0 gestational age 19 weeks with PMHx of DVT, seizure disorder, hypothyroidism, ESRD due to IgA nephropathy s/p kidney transplantation , anxiety, and tertiary hyperparathyroidism admitted to the hospital for elective resection of 3.5 parathyroid glands surgery done for her on 2023.  Postoperative PTH down from 56 to 13.    #Tertiary hyperparathyroidism s/p resection of 3.5 parathyroid glands on 2023:  History of ESRD s/p renal transplant developed tertiary hyperparathyroidism with ongoing hypercalcemia was on Cinacalcet before pregnancy stopped Cinacalcet after got pregnant.  Gestational age 19 weeks now.  PTH intraoperatively down to 19 after the surgery 13.  Ionized calcium was checked once after the surgery 5.5.  ALP normal however still with the 3.5 parathyroid gland there is removal there is risk of developing hungry bone syndrome.  Needs close monitoring of the calcium level.    Recommendations:  -Calcium/ionized calcium/magnesium/phosphorus check 4 hourly.  -If there is rapid reduction in the calcium or developing symptoms related to hypocalcemia or ionized calcium level below 1 or total serum calcium level below 7.5 to start on IV calcium gluconate by giving 1 to 2 g of calcium gluconate gluconate IV over 10 to 20 minutes followed by 10% calcium gluconate (can be added to 1 L of D5 with a ratio of  10 mill of calcium gluconate/mL of D5) infusion with approximately a rate of 50 mill per hour.  Until the calcium normalized of the symptoms resolved.  -Aggressive repletion of magnesium with a target of 2.  -To continue calcium carbonate suspension 5 mill (1250 mg twice daily.)  If the calcium starts to drop to increase up to 10 mils twice daily.  -Continue with calcitriol 0.25 mcg 3 times daily.        #History of hypothyroidism:  Prior to admission was on levothyroxine 37.5 mcg daily 6 days a week and 25 mcg on Sunday.  Most recent TFTs free T4 1.03, TSH was 2.    Recommendations:  -To correct the dose of levothyroxine to 37.5 mcg and will switch to daily dose 7 days a week instead of 6 days(this needs to be changed on the discharge medications also).  To keep TSH at the lower half of the normal    [facility]     Endocrinology diabetes and metabolism  fellow   Pager number: [phone]    I have seen and examined the patient and agree with the fellow's plan of care as noted.  April 28, 2023    Dr. Katrin Lopez [phone]    normal...

## 2023-04-28 NOTE — OR NURSING
PACU to Inpatient Nursing Handoff    Patient Mona Wagner is a 32 year old female who speaks English.   Procedure Procedure(s):  Resection 3 and 1/2 parathyroid glands   Surgeon(s) Primary: Melissa Jones MD  Resident - Assisting: Deny Mae MD     Allergies   Allergen Reactions     Contrast Dye Rash     CT contrast allergy 12/14/19 rash over eyes. Need to have pre medication before a CT WITH CONTRAST      Diatrizoate Rash     CT contrast allergy 12/14/19 rash over eyes. Need to have pre medication before a CT WITH CONTRAST      Lisinopril Swelling     angioedema     Nitrous Oxide Other (See Comments)     Sense of doom     Chlorhexidine Rash     Rash at site     Sulfa Antibiotics Rash     Muscle stiffness of neck     Dapsone      Methemoglobinemia     Furosemide Other (See Comments)     Skin flushing     Metrogel [Metronidazole]      Hives diffusely on body after vaginal administration.     Azithromycin Dizziness and Rash     Cefuroxime Rash       Isolation  [unfilled]     Past Medical History   has a past medical history of Anemia in chronic kidney disease, History of bacterial endocarditis, History of blood transfusion, History of DVT (deep vein thrombosis) (2014), History of seizure (2014), History of subarachnoid hemorrhage, Hydronephrosis, Hypothyroidism, Kidney transplanted (08/03/2019), Orthostatic hypotension, FATOUMATA (obstructive sleep apnea), Pyelonephritis of transplanted kidney (01/23/2020), Secondary renal hyperparathyroidism (H), and Urinary tract infection.    Anesthesia General   Dermatome Level     Preop Meds Tylenol 975 mg; bicitrate 15 ml;    Nerve block Propofol; succinylcholine   Intraop Meds Fentanyl; versed, zofran, decadron, phenylephrine   Local Meds    Antibiotics      Pain Patient Currently in Pain: yes   PACU meds  Fentanyl 25 mcg IV at 1015 given by CRNA; cepecal lozenger; pt took own supply tacrolimus 3.5 mg po   PCA / epidural    Capnography     Telemetry ECG Rhythm: Normal  sinus rhythm   Inpatient Telemetry Monitor Ordered? no        Labs Glucose Lab Results   Component Value Date     04/28/2023    GLC 89 08/02/2022    GLC 91 07/07/2021       Hgb Lab Results   Component Value Date    HGB 9.8 04/25/2023    HGB 12.2 07/07/2021       INR Lab Results   Component Value Date    INR 1.02 03/22/2023    INR 0.95 08/30/2019      PACU Imaging N/A     Wound/Incision Incision/Surgical Site 04/28/23 Medial;Anterior Neck (Active)   Incision Assessment WDL 04/28/23 1015   Closure Liquid bandage 04/28/23 1015   Dressing Intervention Clean, dry, intact 04/28/23 1015   Number of days: 0      CMS        Equipment    Other LDA  L forearm AV fistula; 20 L upper forearm perpheral and 20 L ankle     IV Access Peripheral IV 04/28/23 Right Foot (Active)   Site Assessment WDL 04/28/23 1020   Line Status Saline locked 04/28/23 1020   Dressing Transparent 04/28/23 1020   Dressing Status clean;dry;intact 04/28/23 1020   Phlebitis Scale 0-->no symptoms 04/28/23 1020   Infiltration? no 04/28/23 1020   Number of days: 0       Peripheral IV 04/28/23 Right Upper forearm (Active)   Site Assessment WDL 04/28/23 1020   Line Status Infusing 04/28/23 1020   Dressing Transparent 04/28/23 1020   Dressing Status clean;dry;intact 04/28/23 1020   Phlebitis Scale 0-->no symptoms 04/28/23 1020   Infiltration? no 04/28/23 1020   Number of days: 0       Hemodialysis Vascular Access Arteriovenous fistula Left Forearm (Active)   Site Assessment WDL;Bruit present;Thrill present 04/28/23 1020   Dressing Intervention Other (Comment) 04/28/23 0655   Number of days:       Blood Products  EBL 5 mL   Intake/Output Date 04/28/23 0700 - 04/29/23 0659   Shift 2640-3243 0097-9560 2233-8068 24 Hour Total   INTAKE   I.V. 800   800   Shift Total(mL/kg) 800(10.2)   800(10.2)   OUTPUT   Shift Total(mL/kg)       Weight (kg) 78.4 78.4 78.4 78.4      Drains / Ramirez     Time of void PreOp Time of Void Prior to Procedure: 0017 (04/28/23 2113)     PostOp      Diapered? No   Bladder Scan     PO    60     Vitals    B/P: 120/72  T: 98.8  F (37.1  C)    Temp src: Oral  P:  Pulse: 85 (04/28/23 1115)          R: 16  O2:  SpO2: 95 %    O2 Device: None (Room air) (04/28/23 1045)    Oxygen Delivery: 6 LPM (04/28/23 1015)         Family/support present    Patient belongings  with pt    Patient transported on cart   DC meds/scripts (obs/outpt)    Inpatient Pain Meds Released? Yes       Special needs/considerations  19 wks pregnant   Tasks needing completion PTH, Ca, Mg, BG drawn in PACU.         Cassidy Lopez, RN  [facility] [id number]

## 2023-04-28 NOTE — DISCHARGE INSTRUCTIONS
-Follow up appointment with Dr Jones in about 2 weeks.  - Incision site(s): Keep dressing/incision clean/dry/intact for 24 hours. 24 hours after your operation, you may shower. You have surgical glue over your incision, this will wear off in time. No submersion in water (lake/pool/tub) for one month or until approved by your surgeon. There are two small tails of suture on either end of your incision; this will be trimmed at your follow-up appointment if still present.   - If you develop any fever/chills, difficulty breathing, worsening pain, redness, swelling, nausea or vomiting, tingling of your hands or around your lips, or drainage from your wound please call Dr Jones at [phone]. If unable to reach her, call [phone] and ask to page the Surgical Oncology resident on call.   Ok to take acetaminophen if your pain is not severe enough for narcotic medication. Do not exceed a total of 4,000mg of acetaminophen from all sources per day  -No driving while taking narcotic pain medication.   -It is recommendable to take miralax while on narcotic pain medication to prevent constipation.

## 2023-04-28 NOTE — PLAN OF CARE
VS:       Pt A/O X 4. Afebrile. VSS. Lung sound CTA, equal  bilaterally with both anterior and posterior. IS encouraged. Denies nausea, shortness of breath, and chest pain.     Output:       Bowel sound active in all four quadrants and had passed gas already since she came back from PACU. Voids spontaneously without difficulty in the commode.      Activity:       Pt up A1 GB/walker. Stated feeling a little heavy and light-headed when she got up. Commode offered at first until she tolerated to walk to the bathroom.     Skin:   Intact, no open area. Surgical incision in lower ant neck EMILY, clean,dry, intact.     Pain:      Pain is rated 6/10 and and it hurts when she swallows and stated relief and feeling a lo better with Tylenol and PRN Oxycodone.      CMS:       CMS and Neuro's are intact. Denies numbness and tingling in all extremities.      Dressing:       EMILY     Diet:       Pt started eating and well tolerated. She is on a reg diet and recommended to start with soft bite- sized food with soup.      LDA:       PIV is patent in the in the R hand and saline locked.    Equipment:     Walker provided for ambulation and advised to use commode until feeling heaviness and light-headed are resolved. CPAP at HS.       Plan:     Pain Mgt, no IV opioids. Monitor surgical incision and signs of bleeding. Pills mixed applesauce.Magnesium RN managed. Mag-1.4, replaced. CPAP at HS.        Additional Info:       Coughing out secretions with clotted blood occasionally most probably R/T trauma from ET insertion. Provider had difficulty placing ET during surgery per PACU nurse. Continue  To monitor signs of bleeding.

## 2023-04-28 NOTE — CONSULTS
Brief Consult Note     Mona Wagner is a 32 year old  at 19w0d by 8w4d US who is now POD#0 s/p parathyroidectomy for tertiary hyperparathyrodism. Her medical history is notable for DVT, seizure disorder, hypothyroidism, ESRD 2/2 IgA nephropathy s/p kidney transplantation (2019), anxiety. Gyn team was consulted regarding Asprin use s/p procedure. ASA held 7 days prior to procedure. She has been taking bASA in this pregnancy for preeclampsia prophylaxis. Discussed with RN recommendations to continue bASA s/p procedure for its intended use. No need to change regimen. RN expressed understanding     Gyn will sign off. Please do not hesitate to re-consult as needed.     Chantel Marmolejo MD, MPH, MS (PGY1)   Obstetrics, Gynecology & Women's Health   Resident, PGY-1  2023 4:20 PM    I agree with recommendations as written above.   Tammie Johnson MD

## 2023-04-28 NOTE — PROGRESS NOTES
SURGERY CLINIC CONSULTATION    REASON FOR CONSULTATION:  Mona Wagner was referred by Dr. Lopez for evaluation and discussion of treatment options for tertiary hyperparathyroidism     HISTORY OF PRESENT ILLNESS:  Mona Wagner is a 32 year old female who has a pertinent past medical history of kidney transplant in 2019.  Immediately prior to her kidney transplant her parathyroid hormone level was 1084.  Following her transplant her calcium had decreased as did her parathyroid hormone.  However until recently the patient's calcium had continued to increase.  Most recent labs from earlier this month include a calcium of 11.3 parathyroid hormone level 79 and a low vitamin D (this was measured in 2022).  Dr. Lopez has been attempting to control the patient's calcium however is having a difficult time particularly during the patient's pregnancy.  She is currently at 14 weeks pregnant.  Neck ultrasound identified an area of concern in the right neck.  However only documented a single enlarged suspicious parathyroid gland    Symptoms associated with hyperparathyroidism include muscle aches and fatigue.  She does not have any history of nephrolithiasis or osteoporosis.  No previous or family history of hyperparathyroidism.  That should mention however she does have a history of secondary hyperparathyroidism during her renal failure prior to her transplant      REVIEW OF SYSTEMS:  ROS EXAM: 10 point view of systems is pertinent for that noted in the HPI  Patient Active Problem List   Diagnosis     DVT of upper extremity (deep vein thrombosis) (H)     Seizure disorder (H)     Acquired hypothyroidism     Increased prolactin level     Aftercare following organ transplant     Immunosuppression (H)     Kidney replaced by transplant     Anxiety     Vitamin D deficiency     CKD (chronic kidney disease) stage 3, GFR 30-59 ml/min (H)     Bicornate uterus     Orthostatic hypotension     Tertiary hyperparathyroidism (H)     High-risk  pregnancy in second trimester       Past Surgical History:   Procedure Laterality Date     BENCH KIDNEY N/A 8/3/2019    Procedure: BACKBENCH PREPARATION, ALLOGRAFT, KIDNEY;  Surgeon: Dorian Johnson MD;  Location: [facility] OR     COMBINED CYSTOSCOPY, RETROGRADES, EXCHANGE STENT URETER(S) Right 2020    Procedure: CYSTOSCOPY, CYSOTGRAM, WITH RETROGRADE PYELOGRAM, ureteroscopy,  AND URETERAL STENT;  Surgeon: Wally Britt MD;  Location: [facility] OR     COMBINED CYSTOSCOPY, RETROGRADES, URETEROSCOPY, LASER HOLMIUM LITHOTRIPSY URETER(S), INSERT STENT N/A 2019    Procedure: CYSTOURETEROSCOPY, WITH RETROGRADE PYELOGRAM of transplant kidney, STENT INSERTION, Laser on standby;  Surgeon: Wally Britt MD;  Location: [facility]     CREATE FISTULA ARTERIOVENOUS UPPER EXTREMITY  2014    Procedure: CREATE FISTULA ARTERIOVENOUS UPPER EXTREMITY;  Left Wrist Arteriovenous Fistula Placement;  Surgeon: Shashi Castro MD;  Location: [facility] OR     CREATE FISTULA ARTERIOVENOUS UPPER EXTREMITY       CYSTOSCOPY, RETROGRADES, INSERT STENT URETER(S), COMBINED N/A 2020    Procedure: CYSTOSCOPY, WITH RETROGRADE PYELOGRAM, CYSTOGRAM AND URETERAL STENT INSERTION - TRANSPLANT KIDNEY;  Surgeon: Wally Britt MD;  Location: [facility]     IR CEREBRAL ANGIOGRAM  2014     PERCUTANEOUS BIOPSY KIDNEY Right 2019    Procedure: Right Kidney Biopsy;  Surgeon: Deny Rios MD;  Location: [facility]     RASTA/DIALYSIS CATHETER  12/10/2013          TRANSPLANT KIDNEY RECIPIENT  DONOR N/A 8/3/2019    Procedure: TRANSPLANT, KIDNEY, RECIPIENT,  DONOR with Ureteral Stent Placement;  Surgeon: Dorian Johnson MD;  Location: [facility] OR       Allergies   Allergen Reactions     Contrast Dye Rash     CT contrast allergy 19 rash over eyes. Need to have pre medication before a CT WITH CONTRAST      Diatrizoate Rash     CT contrast allergy 19 rash over eyes. Need to have pre medication before a CT WITH  CONTRAST      Lisinopril Swelling     angioedema     Nitrous Oxide Other (See Comments)     Sense of doom     Chlorhexidine Rash     Rash at site     Sulfa Antibiotics Rash     Muscle stiffness of neck     Dapsone      Methemoglobinemia     Furosemide Other (See Comments)     Skin flushing     Metrogel [Metronidazole]      Hives diffusely on body after vaginal administration.     Azithromycin Dizziness and Rash     Cefuroxime Rash       Medications reviewed in the EMR        Family History   Problem Relation Age of Onset     Kidney Disease Mother      Kidney failure Mother      Coronary Artery Disease Father      Cerebrovascular Disease Father      Heart Disease Father      Sleep Apnea Father      Hypertension Sister      Diabetes Sister      Cerebrovascular Disease Sister      Kidney Disease Sister      Breast Cancer No family hx of      Cancer - colorectal No family hx of      Ovarian Cancer No family hx of      Prostate Cancer No family hx of      Other Cancer No family hx of      Asthma No family hx of      Anesthesia Reaction No family hx of      Deep Vein Thrombosis (DVT) No family hx of      Melanoma No family hx of      Skin Cancer No family hx of      Thrombosis No family hx of         PHYSICAL EXAM:  /65 (BP Location: Right arm, Patient Position: Sitting, Cuff Size: Adult Regular)   Pulse 101   Ht 1.524 m (5')   Wt 76.8 kg (169 lb 6.4 oz)   LMP 12/16/2022   SpO2 97%   BMI 33.08 kg/m      Neck: Thyroid gland is small.  I cannot feel any nodules within the thyroid gland or around it.  There is no cervical lymphadenopathy.  Trachea is midline.    I personally reviewed the radiographic images and laboratory data  ASSESSMENT:   1. Secondary renal hyperparathyroidism (H)        PLAN:   The patient quite likely has tertiary hyperparathyroidism.  This in light of her pregnancy and difficulty in controlling her calcium I think the patient should have surgery within the next 4 to 5 weeks as this would be  the best time with the safest time of her pregnancy.  I discussed a neck exploration resection of parathyroid adenoma yet I believe we would have to do 3 or 3-1/2 gland resection.  We further discussed that the patient will be in the hospital for bone hunger which I expect to occur.  We will treat her with calcium magnesium as needed based on her calcium levels postoperatively.  The risks of bleeding infection injury to the recurrent laryngeal nerve or nerves missed parathyroid adenoma permanent hypocalcemia were discussed with the patient.  We will schedule her for surgery    Melissa Jones MD

## 2023-04-28 NOTE — ANESTHESIA PROCEDURE NOTES
Airway       Patient location during procedure: OR       Procedure Start/Stop Times: 4/28/2023 8:35 AM  Staff -        Anesthesiologist:  Dorian Henriquez MD       Resident/Fellow: Vanessa Negro MD       Performed By: resident  Consent for Airway        Urgency: elective  Indications and Patient Condition       Indications for airway management: beatriz-procedural       Induction type:intravenous       Mask difficulty assessment: 1 - vent by mask    Final Airway Details       Final airway type: endotracheal airway       Successful airway: ETT - single and NIM  Endotracheal Airway Details        ETT size (mm): 6.0       Cuffed: yes       Successful intubation technique: video laryngoscopy       VL Blade Size: Glidescope 3       Grade View of Cords: 1       Adjucts: stylet       Position: Right       Measured from: gums/teeth       Secured at (cm): 21       Bite block used: None    Post intubation assessment        Placement verified by: capnometry, equal breath sounds and chest rise        Number of attempts at approach: 3       Secured with: pink tape       Ease of procedure: easy       Dentition: Intact and Unchanged    Medication(s) Administered   Medication Administration Time: 4/28/2023 8:35 AM    Additional Comments       Grade I view with MAC 3 Glidescope, however could not reach cords anterior from NIM tube. Stiff neck ROM. Grade I view with LoPro 3 but still challenging to advance tube

## 2023-04-28 NOTE — ANESTHESIA POSTPROCEDURE EVALUATION
Patient: Mona Wagner    Procedure: Procedure(s):  Resection 3 and 1/2 parathyroid glands       Anesthesia Type:  General    Note:  Disposition: Outpatient   Postop Pain Control: Uneventful            Sign Out: Well controlled pain   PONV: No   Neuro/Psych: Uneventful            Sign Out: Acceptable/Baseline neuro status   Airway/Respiratory:             Sign Out: Acceptable/Baseline resp. status   CV/Hemodynamics: Uneventful            Sign Out: Acceptable CV status; No obvious hypovolemia; No obvious fluid overload   Other NRE:    DID A NON-ROUTINE EVENT OCCUR? No    Event details/Postop Comments:  Challenging intubation. 3 attempts. Short duration desaturation. Able to mask with two handed technique and  8.0 oral airway.     Grade one view with MAC 3 and Glidescope 3, however anterior airway, small mouth, and poorly movable neck,  making it challenging to advance tube into glottis despite significant curve. Some bleeding noted after attempts. Lip laceration and some right pyriform sinus bleeding. Dr. Jones examined as able with glidescope and packed area during case. This was removed and no concerning bleeding appreciated.     Recommend in future:  -Shoulder ramp  -Glidescope 3 with preformed glidescope stylet.            Last vitals:  Vitals Value Taken Time   /72 04/28/23 1115   Temp 37.1  C (98.8  F) 04/28/23 1015   Pulse 84 04/28/23 1126   Resp 29 04/28/23 1126   SpO2 94 % 04/28/23 1126   Vitals shown include unvalidated device data.    Electronically Signed By: Dorian Henriquez MD  April 28, 2023  11:28 AM

## 2023-04-28 NOTE — OR NURSING
At 1045, RUBEN RN at bedside to [facility].    At 1145, phone report given to Wally Galeano RN.  Updated that pt had taken her own tacrolimus 3.5 mg at 1120.

## 2023-04-28 NOTE — BRIEF OP NOTE
[facility]    Brief Operative Note    Pre-operative diagnosis: Tertiary Hyperparathyroidism (H) [E21.3]  Post-operative diagnosis Same as pre-operative diagnosis    Procedure: Procedure(s):  Resection 3 and 1/2 parathyroid glands  Surgeon: Surgeon(s) and Role:     * Melissa Jones MD - Primary     * Deny Mae MD - Resident - Assisting  Anesthesia: General   Estimated Blood Loss: 5cc    Drains: None  Specimens:   ID Type Source Tests Collected by Time Destination   1 : Righht superior parathyroid Tissue Parathyroid, Superior, Left SURGICAL PATHOLOGY EXAM Melissa Jones MD 4/28/2023  9:16 AM    2 :  Tissue Parathyroid, Inferior, Right SURGICAL PATHOLOGY EXAM Melissa Jones MD 4/28/2023  9:26 AM    3 : Right, superior parathyroid Tissue Parathyroid, Superior, Right SURGICAL PATHOLOGY EXAM Melissa Jones MD 4/28/2023  9:31 AM    4 : Left, superior parathyroid Tissue Parathyroid, Superior, Left SURGICAL PATHOLOGY EXAM Melissa Jones MD 4/28/2023  9:36 AM    5 : parathyroid, Left Inferior Tissue Parathyroid, Inferior, Left SURGICAL PATHOLOGY EXAM Melissa Jones MD 4/28/2023  9:39 AM      Findings:   Enlarged parathyroid glands, left inferior partially resected, remained of gland preserved.   Complications: None.  Implants: * No implants in log *

## 2023-04-28 NOTE — OR NURSING
Clarified at 1050 with Dr Mae, that PTH x 2 (in OR/PACU) and results pending.  Per Dr Mae, okay to transfer to med surg without results.  Clarified Calcium and Magnisium draw at this time while in PACU.      Dr Henriquez called for sign out.  VSS; mild sore throat pain.

## 2023-04-29 VITALS
OXYGEN SATURATION: 99 % | TEMPERATURE: 98.6 F | DIASTOLIC BLOOD PRESSURE: 52 MMHG | HEIGHT: 60 IN | SYSTOLIC BLOOD PRESSURE: 102 MMHG | HEART RATE: 86 BPM | RESPIRATION RATE: 18 BRPM | BODY MASS INDEX: 33.93 KG/M2 | WEIGHT: 172.84 LBS

## 2023-04-29 LAB
ANION GAP SERPL CALCULATED.3IONS-SCNC: 10 MMOL/L (ref 7–15)
BUN SERPL-MCNC: 23.2 MG/DL (ref 6–20)
CA-I BLD-MCNC: 5.2 MG/DL (ref 4.4–5.2)
CA-I BLD-MCNC: 5.5 MG/DL (ref 4.4–5.2)
CA-I BLD-MCNC: 5.6 MG/DL (ref 4.4–5.2)
CALCIUM SERPL-MCNC: 10.1 MG/DL (ref 8.6–10)
CHLORIDE SERPL-SCNC: 104 MMOL/L (ref 98–107)
CREAT SERPL-MCNC: 1.2 MG/DL (ref 0.51–0.95)
DEPRECATED HCO3 PLAS-SCNC: 21 MMOL/L (ref 22–29)
GFR SERPL CREATININE-BSD FRML MDRD: 62 ML/MIN/1.73M2
GLUCOSE BLDC GLUCOMTR-MCNC: 99 MG/DL (ref 70–99)
GLUCOSE SERPL-MCNC: 109 MG/DL (ref 70–99)
MAGNESIUM SERPL-MCNC: 1.8 MG/DL (ref 1.7–2.3)
MAGNESIUM SERPL-MCNC: 1.8 MG/DL (ref 1.7–2.3)
MAGNESIUM SERPL-MCNC: 2 MG/DL (ref 1.7–2.3)
PHOSPHATE SERPL-MCNC: 3.6 MG/DL (ref 2.5–4.5)
PHOSPHATE SERPL-MCNC: 3.8 MG/DL (ref 2.5–4.5)
PHOSPHATE SERPL-MCNC: 3.8 MG/DL (ref 2.5–4.5)
POTASSIUM SERPL-SCNC: 3.9 MMOL/L (ref 3.4–5.3)
PTH-INTACT SERPL-MCNC: 3 PG/ML (ref 15–65)
SODIUM SERPL-SCNC: 135 MMOL/L (ref 136–145)

## 2023-04-29 PROCEDURE — 80048 BASIC METABOLIC PNL TOTAL CA: CPT | Performed by: SURGERY

## 2023-04-29 PROCEDURE — 99231 SBSQ HOSP IP/OBS SF/LOW 25: CPT | Performed by: INTERNAL MEDICINE

## 2023-04-29 PROCEDURE — 250N000013 HC RX MED GY IP 250 OP 250 PS 637: Performed by: STUDENT IN AN ORGANIZED HEALTH CARE EDUCATION/TRAINING PROGRAM

## 2023-04-29 PROCEDURE — 83735 ASSAY OF MAGNESIUM: CPT | Performed by: SURGERY

## 2023-04-29 PROCEDURE — 82330 ASSAY OF CALCIUM: CPT | Mod: 91 | Performed by: SURGERY

## 2023-04-29 PROCEDURE — 83970 ASSAY OF PARATHORMONE: CPT | Performed by: SURGERY

## 2023-04-29 PROCEDURE — G0378 HOSPITAL OBSERVATION PER HR: HCPCS

## 2023-04-29 PROCEDURE — 36415 COLL VENOUS BLD VENIPUNCTURE: CPT | Performed by: SURGERY

## 2023-04-29 PROCEDURE — 250N000013 HC RX MED GY IP 250 OP 250 PS 637: Performed by: SURGERY

## 2023-04-29 PROCEDURE — 250N000012 HC RX MED GY IP 250 OP 636 PS 637: Performed by: SURGERY

## 2023-04-29 PROCEDURE — 84100 ASSAY OF PHOSPHORUS: CPT | Performed by: SURGERY

## 2023-04-29 PROCEDURE — 82330 ASSAY OF CALCIUM: CPT | Performed by: SURGERY

## 2023-04-29 RX ORDER — MAGNESIUM OXIDE 400 MG/1
400 TABLET ORAL EVERY 4 HOURS
Status: COMPLETED | OUTPATIENT
Start: 2023-04-29 | End: 2023-04-29

## 2023-04-29 RX ORDER — OXYCODONE HYDROCHLORIDE 5 MG/1
5 TABLET ORAL EVERY 6 HOURS PRN
Qty: 10 TABLET | Refills: 0 | Status: SHIPPED | OUTPATIENT
Start: 2023-04-29 | End: 2023-05-02

## 2023-04-29 RX ADMIN — Medication 400 MG: at 07:49

## 2023-04-29 RX ADMIN — TACROLIMUS 3.5 MG: 1 CAPSULE ORAL at 09:42

## 2023-04-29 RX ADMIN — Medication 37.5 MCG: at 07:27

## 2023-04-29 RX ADMIN — Medication 400 MG: at 03:45

## 2023-04-29 RX ADMIN — CALCIUM CARBONATE 1250 MG: 1250 SUSPENSION ORAL at 07:48

## 2023-04-29 RX ADMIN — ASPIRIN 81 MG 81 MG: 81 TABLET ORAL at 07:49

## 2023-04-29 RX ADMIN — OXYCODONE HYDROCHLORIDE 5 MG: 5 TABLET ORAL at 02:30

## 2023-04-29 RX ADMIN — CALCITRIOL 0.25 MCG: 0.25 CAPSULE ORAL at 07:49

## 2023-04-29 RX ADMIN — ACETAMINOPHEN 975 MG: 325 TABLET, FILM COATED ORAL at 03:45

## 2023-04-29 RX ADMIN — POLYETHYLENE GLYCOL 3350 17 G: 17 POWDER, FOR SOLUTION ORAL at 07:48

## 2023-04-29 RX ADMIN — DOCUSATE SODIUM 100 MG: 100 CAPSULE, LIQUID FILLED ORAL at 07:48

## 2023-04-29 ASSESSMENT — ACTIVITIES OF DAILY LIVING (ADL)
ADLS_ACUITY_SCORE: 20
ADLS_ACUITY_SCORE: 20
ADLS_ACUITY_SCORE: 22
ADLS_ACUITY_SCORE: 20
ADLS_ACUITY_SCORE: 22

## 2023-04-29 NOTE — PROGRESS NOTES
[id number]    /75 (BP Location: Right arm)   Pulse 83   Temp 98  F (36.7  C) (Oral)   Resp 18   Ht 1.524 m (5')   Wt 78.4 kg (172 lb 13.5 oz)   LMP 12/16/2022   SpO2 99%   BMI 33.76 kg/m      Pt A&Ox4. Afebrile, room air, vital signs stable. Denies SOB, N/V, chest pain and N/T.   Bowel sounds active, passing gas. Last BM per pt report 4/28. Voiding spontaneously without difficulty.   Pt stand by assist with gait belt and walker.   Surgical incision- anterior lower neck open to air. Clean, dry, intact.   Pt c/o 4-6/10 neck pain, managed with tylenol and oxycodone.  Regular diet, recommended to start with bite sized soft food.   CPAP on at night.   RN managed magnesium. Mag- 2.0, replaced.   Able to make needs known. Call light within reach. Continue with plan of care,pain management, monitor surgical incision for signs of bleeding.

## 2023-04-29 NOTE — PLAN OF CARE
Patient A&O x4 an able to make her needs known. Denied CP, lightheadedness, dizziness, numbness or tingling. Denied SOB/ZEPEDA. Pt is eating and drinking well and voiding spontaneously without difficulties. Denied pain and incentive spirometer encouraged and done several times. Pt stated she is 19wd1 and  BPM. Independent in the room and  demonstrates the ability to use call light appropriately. Will continue with POC.

## 2023-04-29 NOTE — DISCHARGE SUMMARY
Pt. discharged at 1300 to home with her  and left with personal belongings. Pt. received complete discharge paperwork and medications as filled by discharge pharmacy. Pt received and signed for the narcotic medication  Pt. was given times of last dose for all discharge medications in writing on discharge medication sheets. Discharge teaching included medication, pain management, activity restrictions, dressing changes, and signs and symptoms of infection.  Pt. had no further questions at the time of discharge and no unmet needs were identified.   Ketoconazole Counseling:   Patient counseled regarding improving absorption with orange juice.  Adverse effects include but are not limited to breast enlargement, headache, diarrhea, nausea, upset stomach, liver function test abnormalities, taste disturbance, and stomach pain.  There is a rare possibility of liver failure that can occur when taking ketoconazole. The patient understands that monitoring of LFTs may be required, especially at baseline. The patient verbalized understanding of the proper use and possible adverse effects of ketoconazole.  All of the patient's questions and concerns were addressed.

## 2023-04-29 NOTE — DISCHARGE SUMMARY
[facility] Discharge Summary    Mona Wagner MRN# [id number]   Age: 32 year old YOB: 1990     Date of Admission:  4/28/2023  Date of Discharge::  4/29/2023  3:00 PM  Admitting Physician:  Melissa Jones MD  Discharge Physician:  Melissa Jones MD     PCP:  Macarena Bowen    Disposition: Patient discharged to home in stable condition.    Admission Diagnosis:  Past Medical History:   Diagnosis Date     Anemia in chronic kidney disease      History of bacterial endocarditis      History of blood transfusion      History of DVT (deep vein thrombosis) 2014     History of seizure 2014     History of subarachnoid hemorrhage      Hydronephrosis      Hypothyroidism      Kidney transplanted 08/03/2019    DCD DDKT. Induction with thymo 6mg/kg.     Orthostatic hypotension      FATOUMATA (obstructive sleep apnea)      Pyelonephritis of transplanted kidney 01/23/2020     Secondary renal hyperparathyroidism (H)      Urinary tract infection        Discharge Diagnosis:  Patient Active Problem List   Diagnosis     DVT of upper extremity (deep vein thrombosis) (H)     Seizure disorder (H)     Acquired hypothyroidism     Increased prolactin level     Aftercare following organ transplant     Immunosuppression (H)     Kidney replaced by transplant     Anxiety     Vitamin D deficiency     CKD (chronic kidney disease) stage 3, GFR 30-59 ml/min (H)     Bicornate uterus     Orthostatic hypotension     Tertiary hyperparathyroidism (H)     High-risk pregnancy in second trimester       Discharge medications  Current Discharge Medication List      START taking these medications    Details   calcitRIOL (ROCALTROL) 0.25 MCG capsule Take 1 capsule (0.25 mcg) by mouth 3 times daily  Qty: 90 capsule, Refills: 0    Associated Diagnoses: Tertiary hyperparathyroidism (H)      oxyCODONE (ROXICODONE) 5 MG tablet Take 1 tablet (5 mg) by mouth every 6 hours as needed for pain  Qty: 10 tablet, Refills:  0    Associated Diagnoses: Tertiary hyperparathyroidism (H)         CONTINUE these medications which have NOT CHANGED    Details   acetaminophen (TYLENOL) 325 MG tablet Take 2 tablets (650 mg) by mouth every 4 hours as needed for mild pain  Qty: 100 tablet, Refills: 3    Associated Diagnoses: Back pain, unspecified back location, unspecified back pain laterality, unspecified chronicity      aspirin (ASA) 81 MG chewable tablet Take 1 tablet (81 mg) by mouth daily  Qty: 90 tablet, Refills: 1    Associated Diagnoses: Kidney transplanted      azaTHIOprine (IMURAN) 50 MG tablet Take 3 tablets (150 mg) by mouth daily  Qty: 270 tablet, Refills: 3    Associated Diagnoses: Kidney replaced by transplant      famotidine (PEPCID) 20 MG tablet Take 1 tablet (20 mg) by mouth 2 times daily  Qty: 180 tablet, Refills: 3    Associated Diagnoses: Heartburn      levothyroxine (SYNTHROID/LEVOTHROID) 25 MCG tablet TAKE 1.5 tablet daily 6 days per week and 1 tablet daily on the 7th day ( will be titrating up)  Qty: 150 tablet, Refills: 3    Associated Diagnoses: Acquired hypothyroidism; Hypothyroidism due to Hashimoto's thyroiditis; Kidney replaced by transplant      polyethylene glycol (MIRALAX) 17 GM/Dose powder Take 17 g (1 capful) by mouth daily as needed for constipation  Qty: 507 g, Refills: 3    Associated Diagnoses: Heartburn; Slow transit constipation      Prenatal Vit-Fe Fumarate-FA (PRENATAL MULTIVITAMIN W/IRON) 27-0.8 MG tablet Take 1 tablet by mouth daily  Qty: 90 tablet, Refills: 3    Associated Diagnoses: Pre-conception counseling      simethicone (MYLICON) 125 MG chewable tablet Take 1 tablet (125 mg) by mouth 4 times daily as needed for intestinal gas  Qty: 120 tablet, Refills: 3    Associated Diagnoses: Bloated abdomen      tacrolimus (GENERIC EQUIVALENT) 0.5 MG capsule Take 1 capsule (0.5 mg) by mouth every morning Total dose = 3.5 mg every AM and 4 mg every PM  Qty: 30 capsule, Refills: 11    Comments: Dose change. TXP  DT 8/3/2019 (Kidney) TXP Dischg DT 8/12/2019 DX Kidney replaced by transplant Z94.0 TX [facility] ([place], [place])  Associated Diagnoses: Kidney replaced by transplant      tacrolimus (GENERIC EQUIVALENT) 1 MG capsule Total dose = 3.5 mg every AM and 4 mg every PM  Qty: 210 capsule, Refills: 11    Comments: Dose change. TXP DT 8/3/2019 (Kidney) TXP Dischg DT 8/12/2019 DX Kidney replaced by transplant Z94.0 TX [facility] ([place], [place])  Associated Diagnoses: Kidney replaced by transplant      calcium carbonate 1250 MG/5ML SUSP suspension Take 5 mLs (1,250 mg) by mouth 2 times daily  Qty: 500 mL, Refills: 1    Associated Diagnoses: Calcium deficiency      cyanocobalamin (VITAMIN B-12) 1000 MCG tablet Take 1 tablet (1,000 mcg) by mouth daily  Qty: 90 tablet, Refills: 1    Associated Diagnoses: Anemia during pregnancy in second trimester      EPINEPHrine (ANY BX GENERIC EQUIV) 0.3 MG/0.3ML injection 2-pack       ferrous sulfate (FEROSUL) 325 (65 Fe) MG tablet Take 1 tablet (325 mg) by mouth daily (with breakfast)  Qty: 90 tablet, Refills: 1    Associated Diagnoses: Anemia during pregnancy in second trimester      vitamin C (ASCORBIC ACID) 250 MG tablet Take 1 tablet (250 mg) by mouth daily  Qty: 90 tablet, Refills: 1    Associated Diagnoses: Anemia during pregnancy in second trimester             Follow up, Special Instructions:  After Care     Future Labs/Procedures    Activity     Comments:    Your activity upon discharge: activity as tolerated    Diet     Comments:    Follow this diet upon discharge: Regular diet    Discharge Instructions     Comments:    May start general diet immediately.    Do not lift more than 20 pounds for 2 weeks after surgery.     You may shower after surgery but do not scrub incisions; simply let soapy water run over them. Pat area dry.     You have glue over the incisions, this will fall off on its  "own and does not need to be replaced.    Do not soak in a bath tub or pool for 6 weeks after surgery.    No driving, no using heavy machinery and no major decision-making while taking narcotic pain medication. It is illegal to drive while taking narcotic pain medication. Narcotics can cause constipation. Take stool softener while taking narcotic pain medication. If no bowel movement for 2 days add a laxative to aid in bowel movement. You can obtain a laxative from your pharmacyst over the counter.     If you develop any abdominal pain, nausea/vomiting, inability to pass gas or stool, fever/chills, worsening pain, redness, swelling, or drainage from your wound please call the clinic (Monday through Friday 8:00am-5:00pm [phone] Cris [facility]SWORTH) or on-call surgical oncology resident (nights and weekends [phone] and ask \"I would like to page the Surgical Oncology Resident on call.\")            Procedures:  4/28 - Resection of 3 1/2 parathyroid glands (Dr. Jones)    Consultations:  ENDOCRINE NON-DIABETES ADULT IP CONSULT  NEPHROLOGY KIDNEY/PANCREAS TRANSPLANT ADULT IP CONSULT  OB GYN IP CONSULT    Brief HPI:  32 year old year old female with a history of symptomatic tertiary hyperparathyroidism. It was recommended she undergo neck exploration and resection of 3 1/2 parathyroid glands.    Hospital Course:  The patient was admitted and underwent the above procedure. The patient tolerated the procedure well. The patient recovered well with no post-operative complications. Calcium and magnesium levels were checked every 6 hours. She needed a one time repletion of magnesium, but otherwise had no laboratory or clinical evidence of hungry bone syndrome. Prior to discharge pain was controlled with oral pain medication and the patient was able to ambulate and void without difficulty. The patient received appropriate education post operatively. On POD #1 the patient was discharged to home.    Surgical pathology  Pending "     Khari Fernando, PGY-4  General Surgery

## 2023-04-29 NOTE — PROGRESS NOTES
"Endocrine Consult Daily  Progress Note          Assessment/Plan:      #1  Tertiary hyperparathyroidism, now status post resection on April 28, 2023  Continue with Rocaltrol 0.25 mcg 3 times daily along with calcium carbonate suspension at 1250 mg  (5 ml) twice daily.  I will set her up for follow-up with me, including blood test for calcium (we will check weekly) on May 3, 2023.  I will continue to follow her calcium levels weekly post-op.  If patient develops tingling around her mouth or fingertips, she should increase her calcium suspension to 1250 mg  (5 ml) 3 times per day and call endocrine on-call    #2 hypothyroidism  Please discharge patient on levothyroxine 37.5 mcg daily    Please make sure these instructions as well as the contact numbers as noted below gets put into her dismissal summary.    For scheduling appointments or labs, please request an appointment through [facility] or call [phone] select option #3 for triage nurse    For questions for your provider or the endocrine nurse, please send a [employer] message or call [phone] select option #3 for triage nurse    If this is an emergency overnight or on weekends, please call [phone]. This will get you the [employer] . Please ask for adult endocrinology \"on call\" and they will connect you to one of my colleagues who will always be available.          Interval History:     Calcium level still remains somewhat elevated but overall improved compared to presurgery.  Currently on Rocaltrol 0.25 mcg 3 times daily and calcium carbonate suspension 1250 mg twice daily.  Patient eating.  Feels well.  Anticipates discharge today.      Recent Labs   Lab 04/29/23  0552 04/28/23  1115 04/25/23  1035   * 106* 92               Review of Systems:             Medications:     Orders Placed This Encounter      Regular Diet Adult       Physical Exam:  Gen: Sitting in bed.  HEENT: NC/AT, mucous membranes are moist noted healing surgical scar  Resp: " Unlabored  Ext: No lower extremity edema   Neuro:oriented x3, communicating clearly  /75 (BP Location: Right arm)   Pulse 83   Temp 98  F (36.7  C) (Oral)   Resp 18   Ht 1.524 m (5')   Wt 78.4 kg (172 lb 13.5 oz)   LMP 12/16/2022   SpO2 99%   BMI 33.76 kg/m             Data:     Component      Latest Ref Rng 4/29/2023  12:23 AM 4/29/2023  5:47 AM 4/29/2023  5:52 AM   Sodium      136 - 145 mmol/L   135 (L)    Potassium      3.4 - 5.3 mmol/L   3.9    Chloride      98 - 107 mmol/L   104    Carbon Dioxide (CO2)      22 - 29 mmol/L   21 (L)    Anion Gap      7 - 15 mmol/L   10    Urea Nitrogen      6.0 - 20.0 mg/dL   23.2 (H)    Creatinine      0.51 - 0.95 mg/dL   1.20 (H)    Calcium      8.6 - 10.0 mg/dL   10.1 (H)    Glucose      70 - 99 mg/dL   109 (H)    GFR Estimate      >60 mL/min/1.73m2   62    Calcium Ionized Whole Blood      4.4 - 5.2 mg/dL 5.6 (H)  5.2     Magnesium      1.7 - 2.3 mg/dL 2.0   1.8    Phosphorus      2.5 - 4.5 mg/dL 3.8   3.8       Legend:  (H) High  (L) Low         25 minutes spent bedside or on the floor counseling and/or coordinating care as well as the total time on the floor/unit attending to the individual patient    Katrin Lopez -[phone]

## 2023-05-01 LAB
PATH REPORT.COMMENTS IMP SPEC: NORMAL
PATH REPORT.FINAL DX SPEC: NORMAL
PATH REPORT.GROSS SPEC: NORMAL
PATH REPORT.INTRAOP OBS SPEC DOC: NORMAL
PATH REPORT.MICROSCOPIC SPEC OTHER STN: NORMAL
PATH REPORT.RELEVANT HX SPEC: NORMAL
PHOTO IMAGE: NORMAL

## 2023-05-02 ENCOUNTER — PATIENT OUTREACH (OUTPATIENT)
Dept: CARE COORDINATION | Facility: CLINIC | Age: 33
End: 2023-05-02
Payer: MEDICARE

## 2023-05-02 ASSESSMENT — ACTIVITIES OF DAILY LIVING (ADL): DEPENDENT_IADLS:: INDEPENDENT

## 2023-05-02 NOTE — PROGRESS NOTES
Clinic Care Coordination Contact  [facility]: Post-Discharge Note  SITUATION                                                      Admission:    Admission Date: 05/28/23   Reason for Admission:   DVT of upper extremity (deep vein thrombosis) (H)    Seizure disorder (H)    Acquired hypothyroidism    Increased prolactin level    Aftercare following organ transplant    Immunosuppression (H)    Kidney replaced by transplant    Anxiety    Vitamin D deficiency    CKD (chronic kidney disease) stage 3, GFR 30-59 ml/min (H)    Bicornate uterus    Orthostatic hypotension    Tertiary hyperparathyroidism (H)    High-risk pregnancy in second trimester  Discharge:   Discharge Date: 05/29/23  Discharge Diagnosis:   DVT of upper extremity (deep vein thrombosis) (H)    Seizure disorder (H)    Acquired hypothyroidism    Increased prolactin level    Aftercare following organ transplant    Immunosuppression (H)    Kidney replaced by transplant    Anxiety    Vitamin D deficiency    CKD (chronic kidney disease) stage 3, GFR 30-59 ml/min (H)    Bicornate uterus    Orthostatic hypotension    Tertiary hyperparathyroidism (H)    High-risk pregnancy in second trimester    BACKGROUND                                                      Per hospital discharge summary and inpatient provider notes:      ASSESSMENT           Discharge Assessment  How are you doing now that you are home?: Pt is doing better, pt is following up with endocrinology, and will call [facility] if she needs a follow up here.  How are your symptoms? (Red Flag symptoms escalate to triage hotline per guidelines): Improved  Do you feel your condition is stable enough to be safe at home until your provider visit?: Yes  Does the patient have their discharge instructions? : Yes  Does the patient have questions regarding their discharge instructions? : No  Were you started on any new medications or were there changes to any of your previous medications? : Yes  Does the patient  have all of their medications?: Yes  Do you have questions regarding any of your medications? : No  Do you have all of your needed medical supplies or equipment (DME)?  (i.e. oxygen tank, CPAP, cane, etc.): No - What equipment or supplies are needed?  Discharge follow-up appointment scheduled within 14 calendar days? : No  Is patient agreeable to assistance with scheduling? : Yes                  PLAN                                                      Outpatient Plan:      Future Appointments   Date Time Provider Department [place]   [facility]   5/10/2023 10:45 AM UR LARRY MOELLER [facility]   5/10/2023 11:00 AM UR EXAM RM 2 [facility]   5/15/2023  2:00 PM Melissa Jones MD [facility]   6/13/2023 11:00 AM Nakul Hill MD [facility]   6/16/2023  1:00 PM Katrin Lopez MD [facility]   7/24/2023 10:00 AM Gelacio Mcintyre MD [facility]   7/28/2023  1:00 PM Katrin Lopez MD [facility]   8/17/2023  2:20 PM Nakul Hill MD [facility]   10/3/2023  8:00 AM Gelacio Mcintyre MD [facility]   11/27/2023  3:05 PM Gelacio Mcintyre MD [facility]         For any urgent concerns, please contact our 24 hour nurse triage line: [phone]       SUSAN West

## 2023-05-03 ENCOUNTER — TELEPHONE (OUTPATIENT)
Dept: TRANSPLANT | Facility: CLINIC | Age: 33
End: 2023-05-03

## 2023-05-03 ENCOUNTER — LAB (OUTPATIENT)
Dept: LAB | Facility: HOSPITAL | Age: 33
End: 2023-05-03
Payer: MEDICARE

## 2023-05-03 ENCOUNTER — TELEPHONE (OUTPATIENT)
Dept: ENDOCRINOLOGY | Facility: CLINIC | Age: 33
End: 2023-05-03

## 2023-05-03 DIAGNOSIS — Z34.90 PREGNANT: ICD-10-CM

## 2023-05-03 DIAGNOSIS — Z48.298 AFTERCARE FOLLOWING ORGAN TRANSPLANT: ICD-10-CM

## 2023-05-03 DIAGNOSIS — E83.52 SERUM CALCIUM ELEVATED: ICD-10-CM

## 2023-05-03 DIAGNOSIS — Z94.0 KIDNEY REPLACED BY TRANSPLANT: ICD-10-CM

## 2023-05-03 DIAGNOSIS — Z94.0 KIDNEY REPLACED BY TRANSPLANT: Primary | ICD-10-CM

## 2023-05-03 DIAGNOSIS — E58 CALCIUM DEFICIENCY: ICD-10-CM

## 2023-05-03 LAB
ANION GAP SERPL CALCULATED.3IONS-SCNC: 13 MMOL/L (ref 7–15)
BUN SERPL-MCNC: 30.5 MG/DL (ref 6–20)
CALCIUM SERPL-MCNC: 11.8 MG/DL (ref 8.6–10)
CALCIUM SERPL-MCNC: 11.8 MG/DL (ref 8.6–10)
CALCIUM, IONIZED MEASURED: 1.51 MMOL/L (ref 1.11–1.3)
CHLORIDE SERPL-SCNC: 105 MMOL/L (ref 98–107)
CREAT SERPL-MCNC: 1.23 MG/DL (ref 0.51–0.95)
DEPRECATED HCO3 PLAS-SCNC: 21 MMOL/L (ref 22–29)
ERYTHROCYTE [DISTWIDTH] IN BLOOD BY AUTOMATED COUNT: 13.1 % (ref 10–15)
GFR SERPL CREATININE-BSD FRML MDRD: 60 ML/MIN/1.73M2
GLUCOSE SERPL-MCNC: 97 MG/DL (ref 70–99)
HCT VFR BLD AUTO: 28.3 % (ref 35–47)
HGB BLD-MCNC: 9.6 G/DL (ref 11.7–15.7)
ION CA PH 7.4: 1.53 MMOL/L (ref 1.11–1.3)
MCH RBC QN AUTO: 31 PG (ref 26.5–33)
MCHC RBC AUTO-ENTMCNC: 33.9 G/DL (ref 31.5–36.5)
MCV RBC AUTO: 91 FL (ref 78–100)
PH: 7.42 (ref 7.35–7.45)
PLATELET # BLD AUTO: 205 10E3/UL (ref 150–450)
POTASSIUM SERPL-SCNC: 4.3 MMOL/L (ref 3.4–5.3)
RBC # BLD AUTO: 3.1 10E6/UL (ref 3.8–5.2)
SODIUM SERPL-SCNC: 139 MMOL/L (ref 136–145)
TACROLIMUS BLD-MCNC: 3.1 UG/L (ref 5–15)
TME LAST DOSE: ABNORMAL H
TME LAST DOSE: ABNORMAL H
WBC # BLD AUTO: 10.7 10E3/UL (ref 4–11)

## 2023-05-03 PROCEDURE — 80048 BASIC METABOLIC PNL TOTAL CA: CPT

## 2023-05-03 PROCEDURE — 82330 ASSAY OF CALCIUM: CPT

## 2023-05-03 PROCEDURE — 85027 COMPLETE CBC AUTOMATED: CPT

## 2023-05-03 PROCEDURE — 36415 COLL VENOUS BLD VENIPUNCTURE: CPT

## 2023-05-03 PROCEDURE — 80197 ASSAY OF TACROLIMUS: CPT

## 2023-05-03 NOTE — TELEPHONE ENCOUNTER
----- Message from Gelacio Mcintyre MD sent at 5/3/2023  9:50 AM CDT -----  Hold calcium carbonate, hold calcitriol. Repeat labs on Friday. If calcium downtrending can restart some calcitriol.

## 2023-05-03 NOTE — TELEPHONE ENCOUNTER
Called pt - results reviwed - pt to stop supplmenetation and check on Friday    -  Dear Mona    Please stop the calcium and vitamin D supplementation and check labs on Friday    If you have any questions, please feel free to contact my nurse at [phone] select option #3 for triage nurse  or  option #1 for scheduling related questions.    Regards    Katrin Lopez MD     Lab on 05/03/2023   Component Date Value Ref Range Status     Sodium 05/03/2023 139  136 - 145 mmol/L Final     Potassium 05/03/2023 4.3  3.4 - 5.3 mmol/L Final     Chloride 05/03/2023 105  98 - 107 mmol/L Final     Carbon Dioxide (CO2) 05/03/2023 21 (L)  22 - 29 mmol/L Final     Anion Gap 05/03/2023 13  7 - 15 mmol/L Final     Urea Nitrogen 05/03/2023 30.5 (H)  6.0 - 20.0 mg/dL Final     Creatinine 05/03/2023 1.23 (H)  0.51 - 0.95 mg/dL Final     Calcium 05/03/2023 11.8 (H)  8.6 - 10.0 mg/dL Final     Glucose 05/03/2023 97  70 - 99 mg/dL Final     GFR Estimate 05/03/2023 60 (L)  >60 mL/min/1.73m2 Final    eGFR calculated using 2021 CKD-EPI equation.     WBC Count 05/03/2023 10.7  4.0 - 11.0 10e3/uL Final     RBC Count 05/03/2023 3.10 (L)  3.80 - 5.20 10e6/uL Final     Hemoglobin 05/03/2023 9.6 (L)  11.7 - 15.7 g/dL Final     Hematocrit 05/03/2023 28.3 (L)  35.0 - 47.0 % Final     MCV 05/03/2023 91  78 - 100 fL Final     MCH 05/03/2023 31.0  26.5 - 33.0 pg Final     MCHC 05/03/2023 33.9  31.5 - 36.5 g/dL Final     RDW 05/03/2023 13.1  10.0 - 15.0 % Final     Platelet Count 05/03/2023 205  150 - 450 10e3/uL Final     Calcium 05/03/2023 11.8 (H)  8.6 - 10.0 mg/dL Final     Calcium, Ionized pH 7.4 05/03/2023 1.53 (H)  1.11 - 1.30 mmol/L Final     pH 05/03/2023 7.42  7.35 - 7.45 Final     Calcium, Ionized Measured 05/03/2023 1.51 (H)  1.11 - 1.30 mmol/L Final   Admission on 04/28/2023, Discharged on 04/29/2023   Component Date Value Ref Range Status     Case Report 04/28/2023    Final                    Value:Surgical Pathology Report                          Case: [id number]                                  Authorizing Provider:  Melissa Jones MD   Collected:           04/28/2023 09:16 AM          Ordering Location:     [facility] MAIN OR                 Received:            04/28/2023 09:28 AM          Pathologist:           Aisha Walters MD                                                                           Intraop:               Emerald Aquino MD                                                   Specimens:   A) - Parathyroid, Superior, Right, Right superior parathyroid                                       B) - Parathyroid, Inferior, Right                                                                   D) - Parathyroid, Superior, Left, Left, superior parathyroid                                        E) - Parathyroid, Inferior, Left, parathyroid, Left Inferior                                Final Diagnosis 04/28/2023    Final                    Value:This result contains rich text formatting which cannot be displayed here.    This is an appended report. These results have been appended to a previously preliminary verified report.     Comment 04/28/2023    Final                    Value:This result contains rich text formatting which cannot be displayed here.    This is an appended report. These results have been appended to a previously preliminary verified report.     Clinical Information 04/28/2023    Final                    Value:This result contains rich text formatting which cannot be displayed here.    This is an appended report. These results have been appended to a previously preliminary verified report.     Intraoperative Consultation 04/28/2023    Final                    Value:This result contains rich text formatting which cannot be displayed here.    Corrected result: Previously reported as [Previous value contains rich text formatting which cannot  be displayed here] (see Result History) on 4/28/2023 at 10:09 AM CDT.     Gross Description 04/28/2023    Final                    Value:This result contains rich text formatting which cannot be displayed here.    This is an appended report. These results have been appended to a previously preliminary verified report.     Microscopic Description 04/28/2023    Final                    Value:This result contains rich text formatting which cannot be displayed here.    This is an appended report. These results have been appended to a previously preliminary verified report.     Performing Labs 04/28/2023    Final                    Value:This result contains rich text formatting which cannot be displayed here.    This is an appended report. These results have been appended to a previously preliminary verified report.     Parathyroid Hormone Intact Intraop* 04/28/2023 19  15 - 65 pg/mL Final     Parathyroid Hormone Intact 04/28/2023 13 (L)  15 - 65 pg/mL Final     GLUCOSE BY METER POCT 04/28/2023 106 (H)  70 - 99 mg/dL Final     Calcium Ionized 04/28/2023 5.5 (H)  4.4 - 5.2 mg/dL Final     Magnesium 04/28/2023 1.4 (L)  1.7 - 2.3 mg/dL Final     Calcium Ionized 04/28/2023 5.4 (H)  4.4 - 5.2 mg/dL Final     Magnesium 04/28/2023 2.7 (H)  1.7 - 2.3 mg/dL Final     Phosphorus 04/28/2023 3.4  2.5 - 4.5 mg/dL Final     Alkaline Phosphatase 04/28/2023 51  35 - 104 U/L Final     Calcium Ionized 04/29/2023 5.6 (H)  4.4 - 5.2 mg/dL Final     Magnesium 04/29/2023 2.0  1.7 - 2.3 mg/dL Final     Phosphorus 04/29/2023 3.8  2.5 - 4.5 mg/dL Final     Calcium Ionized 04/29/2023 5.2  4.4 - 5.2 mg/dL Final     Magnesium 04/29/2023 1.8  1.7 - 2.3 mg/dL Final     Phosphorus 04/29/2023 3.8  2.5 - 4.5 mg/dL Final     Parathyroid Hormone Intact 04/29/2023 3 (L)  15 - 65 pg/mL Final     Sodium 04/29/2023 135 (L)  136 - 145 mmol/L Final     Potassium 04/29/2023 3.9  3.4 - 5.3 mmol/L Final     Chloride 04/29/2023 104  98 - 107 mmol/L Final      Carbon Dioxide (CO2) 04/29/2023 21 (L)  22 - 29 mmol/L Final     Anion Gap 04/29/2023 10  7 - 15 mmol/L Final     Urea Nitrogen 04/29/2023 23.2 (H)  6.0 - 20.0 mg/dL Final     Creatinine 04/29/2023 1.20 (H)  0.51 - 0.95 mg/dL Final     Calcium 04/29/2023 10.1 (H)  8.6 - 10.0 mg/dL Final     Glucose 04/29/2023 109 (H)  70 - 99 mg/dL Final     GFR Estimate 04/29/2023 62  >60 mL/min/1.73m2 Final    eGFR calculated using 2021 CKD-EPI equation.     Calcium Ionized 04/29/2023 5.5 (H)  4.4 - 5.2 mg/dL Final     Magnesium 04/29/2023 1.8  1.7 - 2.3 mg/dL Final     Phosphorus 04/29/2023 3.6  2.5 - 4.5 mg/dL Final     GLUCOSE BY METER POCT 04/29/2023 99  70 - 99 mg/dL Final

## 2023-05-03 NOTE — TELEPHONE ENCOUNTER
Spoke to patient and advised on recommendations to hold calcium carbonate and calcitriol and repeat labs Friday. Orders placed and she will call to schedule lab appt.     She did ask if she were to experience tingling if she should take a dose of the calcium carbonate. Reviewed her calcium level was very high and hopefully she does not experience this prior to Friday but if she does, to please reach out prior to taking a dose of calcium. Pt understands recommendations and will reach out with any questions or concerns in the mean time.

## 2023-05-04 ENCOUNTER — TELEPHONE (OUTPATIENT)
Dept: TRANSPLANT | Facility: CLINIC | Age: 33
End: 2023-05-04
Payer: MEDICAID

## 2023-05-04 DIAGNOSIS — Z94.0 KIDNEY REPLACED BY TRANSPLANT: Primary | ICD-10-CM

## 2023-05-04 RX ORDER — TACROLIMUS 0.5 MG/1
0.5 CAPSULE ORAL 2 TIMES DAILY
Qty: 60 CAPSULE | Refills: 11 | Status: SHIPPED | OUTPATIENT
Start: 2023-05-04 | End: 2023-05-16 | Stop reason: DRUGHIGH

## 2023-05-04 RX ORDER — TACROLIMUS 1 MG/1
4 CAPSULE ORAL 2 TIMES DAILY
Qty: 240 CAPSULE | Refills: 11 | Status: SHIPPED | OUTPATIENT
Start: 2023-05-04 | End: 2023-05-16

## 2023-05-04 NOTE — TELEPHONE ENCOUNTER
ISSUE:   Tacrolimus IR level 3.1 on 5/3, goal 4-6, dose 3.5 mg every AM and 4 mg every PM.    PLAN:   Please call patient and confirm this was an accurate 12-hour trough. Verify Tacrolimus IR dose 3.5/4 mg. Confirm no new medications or illness. Confirm no missed doses. If accurate trough and accurate dose, increase Tacrolimus IR dose to 4.5 mg BID and repeat labs in 1 weeks    OUTCOME:   Spoke with patient, they confirm accurate trough level and current dose 3.5/4 mg. Patient confirmed dose change to 4.5 mg BID and to repeat labs next week. Pt states she will check labs on Wed with her ionized calcium. Orders sent to preferred pharmacy for dose change and lab for repeat labs. Patient voiced understanding of plan.

## 2023-05-05 ENCOUNTER — TELEPHONE (OUTPATIENT)
Dept: TRANSPLANT | Facility: CLINIC | Age: 33
End: 2023-05-05

## 2023-05-05 ENCOUNTER — LAB (OUTPATIENT)
Dept: LAB | Facility: HOSPITAL | Age: 33
End: 2023-05-05
Payer: MEDICARE

## 2023-05-05 DIAGNOSIS — E58 CALCIUM DEFICIENCY: ICD-10-CM

## 2023-05-05 DIAGNOSIS — Z34.90 PREGNANT: ICD-10-CM

## 2023-05-05 DIAGNOSIS — E83.52 SERUM CALCIUM ELEVATED: ICD-10-CM

## 2023-05-05 DIAGNOSIS — Z94.0 KIDNEY REPLACED BY TRANSPLANT: ICD-10-CM

## 2023-05-05 DIAGNOSIS — Z94.0 KIDNEY REPLACED BY TRANSPLANT: Primary | ICD-10-CM

## 2023-05-05 DIAGNOSIS — Z48.298 AFTERCARE FOLLOWING ORGAN TRANSPLANT: ICD-10-CM

## 2023-05-05 LAB
ANION GAP SERPL CALCULATED.3IONS-SCNC: 13 MMOL/L (ref 7–15)
BUN SERPL-MCNC: 34 MG/DL (ref 6–20)
CALCIUM SERPL-MCNC: 10.2 MG/DL (ref 8.6–10)
CALCIUM SERPL-MCNC: 10.2 MG/DL (ref 8.6–10)
CALCIUM, IONIZED MEASURED: 1.36 MMOL/L (ref 1.11–1.3)
CHLORIDE SERPL-SCNC: 106 MMOL/L (ref 98–107)
CREAT SERPL-MCNC: 1.36 MG/DL (ref 0.51–0.95)
DEPRECATED HCO3 PLAS-SCNC: 20 MMOL/L (ref 22–29)
ERYTHROCYTE [DISTWIDTH] IN BLOOD BY AUTOMATED COUNT: 13.1 % (ref 10–15)
GFR SERPL CREATININE-BSD FRML MDRD: 53 ML/MIN/1.73M2
GLUCOSE SERPL-MCNC: 94 MG/DL (ref 70–99)
HCT VFR BLD AUTO: 28.9 % (ref 35–47)
HGB BLD-MCNC: 9.5 G/DL (ref 11.7–15.7)
ION CA PH 7.4: 1.33 MMOL/L (ref 1.11–1.3)
MCH RBC QN AUTO: 30.5 PG (ref 26.5–33)
MCHC RBC AUTO-ENTMCNC: 32.9 G/DL (ref 31.5–36.5)
MCV RBC AUTO: 93 FL (ref 78–100)
PH: 7.37 (ref 7.35–7.45)
PLATELET # BLD AUTO: 207 10E3/UL (ref 150–450)
POTASSIUM SERPL-SCNC: 4.4 MMOL/L (ref 3.4–5.3)
RBC # BLD AUTO: 3.11 10E6/UL (ref 3.8–5.2)
SODIUM SERPL-SCNC: 139 MMOL/L (ref 136–145)
TACROLIMUS BLD-MCNC: 3.3 UG/L (ref 5–15)
TME LAST DOSE: ABNORMAL H
TME LAST DOSE: ABNORMAL H
WBC # BLD AUTO: 10.3 10E3/UL (ref 4–11)

## 2023-05-05 PROCEDURE — 80048 BASIC METABOLIC PNL TOTAL CA: CPT

## 2023-05-05 PROCEDURE — 82330 ASSAY OF CALCIUM: CPT

## 2023-05-05 PROCEDURE — 85027 COMPLETE CBC AUTOMATED: CPT

## 2023-05-05 PROCEDURE — 36415 COLL VENOUS BLD VENIPUNCTURE: CPT

## 2023-05-05 PROCEDURE — 80197 ASSAY OF TACROLIMUS: CPT

## 2023-05-05 RX ORDER — CALCITRIOL 0.25 UG/1
0.25 CAPSULE, LIQUID FILLED ORAL DAILY
Qty: 28 CAPSULE | Refills: 0
Start: 2023-05-05 | End: 2023-06-23

## 2023-05-05 NOTE — TELEPHONE ENCOUNTER
ISSUE:  Recent labs with creatinine increase, sent to Dr. Mcintyre for review:  Thanks. It is because of the calcium being high. I sent a message regarding restarting calcitriol at 0.25mcg daily and repeating labs next week with ionized calcium and phos (along with usual labs)       OUTCOME:  Phone call to Mona & reviewed plan to start calcitriol 0.25mg daily, she has plenty of medication at home. She also asked about increase in creatinine, reviewed information from Dr. Mcintyre.  Mona mentioned she's staying well hydrated - maybe urinating more than she's drinking.   Mona would like to repeat labs 5-, orders are entered.

## 2023-05-08 ENCOUNTER — TELEPHONE (OUTPATIENT)
Dept: TRANSPLANT | Facility: CLINIC | Age: 33
End: 2023-05-08
Payer: MEDICAID

## 2023-05-08 NOTE — TELEPHONE ENCOUNTER
Called Mona to discuss her Tacro level, during the call she mentioned that on 5/3 Dr Lopez instructed her to stop her calcium and vit D which she did. On 5/5 she reports having some muscle weakness and tingling so she took calcium on 5/6 and 5/7, which she feels helped, as the symptoms have resolved.   Mona also reports some right-sided discomfort and increased urination over the past few days and wonders if this is due to the baby (20 weeks gestation) pressing on her bladder and txp kidney. She denies any other new symptoms.  Will route to Dr Mcintyre for his recommendations.

## 2023-05-09 ENCOUNTER — LAB (OUTPATIENT)
Dept: LAB | Facility: HOSPITAL | Age: 33
End: 2023-05-09
Payer: MEDICARE

## 2023-05-09 ENCOUNTER — MYC MEDICAL ADVICE (OUTPATIENT)
Dept: TRANSPLANT | Facility: CLINIC | Age: 33
End: 2023-05-09

## 2023-05-09 DIAGNOSIS — E58 CALCIUM DEFICIENCY: ICD-10-CM

## 2023-05-09 DIAGNOSIS — Z94.0 KIDNEY REPLACED BY TRANSPLANT: ICD-10-CM

## 2023-05-09 DIAGNOSIS — Z48.298 AFTERCARE FOLLOWING ORGAN TRANSPLANT: ICD-10-CM

## 2023-05-09 DIAGNOSIS — Z94.0 KIDNEY REPLACED BY TRANSPLANT: Primary | ICD-10-CM

## 2023-05-09 LAB
ANION GAP SERPL CALCULATED.3IONS-SCNC: 14 MMOL/L (ref 7–15)
BUN SERPL-MCNC: 31.9 MG/DL (ref 6–20)
CALCIUM SERPL-MCNC: 10 MG/DL (ref 8.6–10)
CALCIUM SERPL-MCNC: 10 MG/DL (ref 8.6–10)
CALCIUM, IONIZED MEASURED: 1.32 MMOL/L (ref 1.11–1.3)
CHLORIDE SERPL-SCNC: 106 MMOL/L (ref 98–107)
CREAT SERPL-MCNC: 1.33 MG/DL (ref 0.51–0.95)
DEPRECATED HCO3 PLAS-SCNC: 19 MMOL/L (ref 22–29)
ERYTHROCYTE [DISTWIDTH] IN BLOOD BY AUTOMATED COUNT: 12.9 % (ref 10–15)
GFR SERPL CREATININE-BSD FRML MDRD: 55 ML/MIN/1.73M2
GLUCOSE SERPL-MCNC: 97 MG/DL (ref 70–99)
HCT VFR BLD AUTO: 27.4 % (ref 35–47)
HGB BLD-MCNC: 8.9 G/DL (ref 11.7–15.7)
ION CA PH 7.4: 1.3 MMOL/L (ref 1.11–1.3)
MCH RBC QN AUTO: 30.3 PG (ref 26.5–33)
MCHC RBC AUTO-ENTMCNC: 32.5 G/DL (ref 31.5–36.5)
MCV RBC AUTO: 93 FL (ref 78–100)
PH: 7.36 (ref 7.35–7.45)
PHOSPHATE SERPL-MCNC: 4.6 MG/DL (ref 2.5–4.5)
PLATELET # BLD AUTO: 202 10E3/UL (ref 150–450)
POTASSIUM SERPL-SCNC: 4.2 MMOL/L (ref 3.4–5.3)
RBC # BLD AUTO: 2.94 10E6/UL (ref 3.8–5.2)
SODIUM SERPL-SCNC: 139 MMOL/L (ref 136–145)
TACROLIMUS BLD-MCNC: 3.4 UG/L (ref 5–15)
TME LAST DOSE: ABNORMAL H
TME LAST DOSE: ABNORMAL H
WBC # BLD AUTO: 10.1 10E3/UL (ref 4–11)

## 2023-05-09 PROCEDURE — 80048 BASIC METABOLIC PNL TOTAL CA: CPT

## 2023-05-09 PROCEDURE — 85014 HEMATOCRIT: CPT

## 2023-05-09 PROCEDURE — 82330 ASSAY OF CALCIUM: CPT

## 2023-05-09 PROCEDURE — 84100 ASSAY OF PHOSPHORUS: CPT

## 2023-05-09 PROCEDURE — 80197 ASSAY OF TACROLIMUS: CPT

## 2023-05-09 PROCEDURE — 36415 COLL VENOUS BLD VENIPUNCTURE: CPT

## 2023-05-09 RX ORDER — TACROLIMUS 5 MG/1
5 CAPSULE ORAL 2 TIMES DAILY
Qty: 60 CAPSULE | Refills: 11 | Status: SHIPPED | OUTPATIENT
Start: 2023-05-09 | End: 2023-05-16

## 2023-05-10 ENCOUNTER — OFFICE VISIT (OUTPATIENT)
Dept: MATERNAL FETAL MEDICINE | Facility: CLINIC | Age: 33
End: 2023-05-10
Attending: ADVANCED PRACTICE MIDWIFE
Payer: MEDICARE

## 2023-05-10 ENCOUNTER — HOSPITAL ENCOUNTER (OUTPATIENT)
Dept: ULTRASOUND IMAGING | Facility: CLINIC | Age: 33
Discharge: HOME | End: 2023-05-10
Attending: ADVANCED PRACTICE MIDWIFE
Payer: MEDICARE

## 2023-05-10 VITALS
DIASTOLIC BLOOD PRESSURE: 80 MMHG | RESPIRATION RATE: 18 BRPM | OXYGEN SATURATION: 100 % | HEART RATE: 80 BPM | SYSTOLIC BLOOD PRESSURE: 121 MMHG

## 2023-05-10 DIAGNOSIS — Z94.0 KIDNEY REPLACED BY TRANSPLANT: ICD-10-CM

## 2023-05-10 DIAGNOSIS — Z94.0 CURRENT PREGNANCY IN SECOND TRIMESTER WITH HISTORY OF KIDNEY TRANSPLANTATION: Primary | ICD-10-CM

## 2023-05-10 DIAGNOSIS — O09.92 SUPERVISION OF HIGH RISK PREGNANCY IN SECOND TRIMESTER: Primary | ICD-10-CM

## 2023-05-10 DIAGNOSIS — O34.02 BICORNUATE UTERUS AFFECTING PREGNANCY IN SECOND TRIMESTER, ANTEPARTUM: ICD-10-CM

## 2023-05-10 DIAGNOSIS — E07.9 DISORDER OF THYROID, UNSPECIFIED: ICD-10-CM

## 2023-05-10 DIAGNOSIS — O09.892 CURRENT PREGNANCY IN SECOND TRIMESTER WITH HISTORY OF KIDNEY TRANSPLANTATION: Primary | ICD-10-CM

## 2023-05-10 DIAGNOSIS — Z87.440 HISTORY OF RECURRENT UTIS: ICD-10-CM

## 2023-05-10 DIAGNOSIS — O09.91 HIGH-RISK PREGNANCY IN FIRST TRIMESTER: ICD-10-CM

## 2023-05-10 DIAGNOSIS — Q51.3 BICORNUATE UTERUS AFFECTING PREGNANCY IN SECOND TRIMESTER, ANTEPARTUM: ICD-10-CM

## 2023-05-10 LAB — HOLD SPECIMEN: NORMAL

## 2023-05-10 PROCEDURE — 76817 TRANSVAGINAL US OBSTETRIC: CPT | Mod: 26 | Performed by: OBSTETRICS & GYNECOLOGY

## 2023-05-10 PROCEDURE — G0463 HOSPITAL OUTPT CLINIC VISIT: HCPCS | Mod: 25

## 2023-05-10 PROCEDURE — 76811 OB US DETAILED SNGL FETUS: CPT

## 2023-05-10 PROCEDURE — 99213 OFFICE O/P EST LOW 20 MIN: CPT | Mod: 25

## 2023-05-10 PROCEDURE — 76811 OB US DETAILED SNGL FETUS: CPT | Mod: 26 | Performed by: OBSTETRICS & GYNECOLOGY

## 2023-05-10 NOTE — PROGRESS NOTES
"Maternal fetal Medicine OB Follow up visit.     Mona Wagner  : 1990  MRN: [id number]    CC: OB Follow-up    Subjective:  Mona Wagner is a 32 year old  at 20w5d presenting for routine OB follow-up. Today, she is here with Jt, and is feeling well since her surgery. A bit tired and sore, but states it went well and her calcium level is now normal. She is planning for weekly labs followed by endo and nephrology for ongoing surveillance. Her creatinine is elevated above her baseline, but Dr. Mcintyre is aware and believes it is due to her supplementation and plans to continue to monitor. She does also note some weakness and increased heart pounding with activity. Is wondering if this might be related to her low hemoglobin. She is aware that Epo may be recommended by nephrology if her iron is consistently below 9. Her tacrolimus was also recently increased.    Patient denies regular, painful contractions, denies loss of fluid or vaginal bleeding. Reports fetal movement. She continues to have discomfort c/w RL pain, especially around the location of her transplanted kidney. Denies any concerning urinary symptoms. Her BPs at home continue to be about 115s/60s.        OB Hx:  OB History    Para Term  AB Living   1 0 0 0 0 0   SAB IAB Ectopic Multiple Live Births   0 0 0 0 0      # Outcome Date GA Lbr Robinson/2nd Weight Sex Delivery Anes PTL Lv   1 Current                  Objective:  /80 (BP Location: Right arm, Patient Position: Sitting, Cuff Size: Adult Regular)   Pulse 80   Resp 18   LMP 2022   SpO2 100%   Gen: alert, oriented, NAD  Skin: warm, dry, intact  Respiratory: breathing unlabored, no SOB  Abdominal: gravid, non-tender  Pelvic: deferred  Extremities: WNL. AV fistula in place on left forearm.  Psych: mood WNL, behavior WNL      OB Ultrasound:  Please see \"imaging\" tab under chart review for today's ultrasound results.      Assessment/Plan:  32 year old  at 20w5d here for " follow OB visit.    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 Mcintyre in transplant nephrology. Last visit on 4/13 with plans for labs q2 weeks with increase to weekly in the 3rd trimester.  - Resolution of cHTN s/p transplant.  - Continue with Tacrolimus (goal 4-6) and Azathioprine as prescribed by nephrology. Dose recently increased to 3.5mg AM and 4mg PM daily.   - Baseline Cr 0.8-1.1; mild proteinuria UPC ratio 0.25.   - Close management of blood pressure with goal of <130/90  - Early detection and treatment of asymptomatic bacteriuria with urine culture at least every trimester. UA/UC last on 3/29 and WNL.  - Reviewed Cr and Hgb with Dr. Tellez and will not plan for additional tests at this time as nephrology is aware and managing.      Hypothyroidism:  Secondary Hyperparathyroidism with hypercalcemia   - Resection of parathyroid glands on 4/28. Per pre-op notes, surgery is scheduled on the [facility] to have access to OB and OB anesthesia.                Per endo: will plan for for liquid calcium supplementation for stable levels post-surgery.  - Follows with endo. Continue current levothyroxine prescription 37.5mcg daily.  - TSH of 0.37 and normal T4 on 2/28/23.     Hx DVT:  - Assessment for inherited thrombophilias including Factor V Leiden and Prothrombin Gene Mutation:  Negative.  - Additional hypercoagulability work up negative.     Hx of bacterial endocarditis:  - Echo on 3/20:  Interpretation Summary  Global and regional left ventricular function is hyperkinetic with an EF >70%.  Right ventricular function, chamber size, wall motion, and thickness are  normal.  Pulmonary artery systolic pressure is normal.  The inferior vena  cava cannot be assessed.  No pericardial effusion is present.     Routine PNC:  - Prenatal labs:               Rh: +  antibody: neg               HepB/HIV/RPR/HepC: nonreactive               GC/CT: neg               Rubella: non-immune  Varicella: non-immune               GCT: passed early GCT. Will repeat 24-28 weeks               UC: no growth               Pap: NIL and HPV neg  - Immunizations: s/p Flu and Covid. Recommend Tdap at 28 weeks  - Genetic screening: NIPT low-risk. Low-risk NT  - Reviewed common musculoskeletal discomforts of pregnancy and that given her transplanted kidney location and bicornuate uterus, she may be more prone to feeling stretching associated with gravid uterus.  - PTL s/s reviewed to help differentiate from typical pregnancy symptoms.       Surveillance:  - 2/3 complete.   - Serial growth US q4 after anatomy scan  - Weekly BPPs at 32 weeks     Delivery Planning:  - Timing of delivery will be indicated by secondary sequelae, but generally 37-39 weeks.  reserved for usual obstetrical indications.   - Labor analgesia: will discuss at later date  - Feeding: plans to formula feed  - Contraception: needs to be discussed      20 minutes spent on the date of the encounter, doing chart review, history and exam, documentation and further activities as noted.      Sonam Toledo CNM on 5/10/2023 at 11:53 AM

## 2023-05-10 NOTE — PROGRESS NOTES
"Please see \"Imaging\" tab under \"Chart Review\" for details of today's US at the [facility].    Timothy Tellez MD  Maternal-Fetal Medicine      "

## 2023-05-10 NOTE — NURSING NOTE
Mona seen in clinic today for OB visit at 20w5d gestation for pregnancy complicated by history of kidney transplant, FATOUMATA, hypothyroid, anemia, hx bacterial endocarditis, hx DVT, hx prediabetes (see report/notes). VSS. Pt reports feeling fetal movement, denies bldg/lof/change in discharge/contractions/headache/vision changes/chest pain/SOB/edema. Pt assessed for  labor, preeclampsia, infection, fetal wellbeing without concerns. Doing overall well since surgery, has noted more fatigue, weakness, and some palpitations since. She still has some discomfort in her throat but is not requiring pain medications for it at this point. Wondering about her labs that were drawn yesterday. Pt notified to review new pt education in AVS, verbally reviewed highlights. Sonam Toledo CNM met with pt and discussed POC. Plan for return in 2 weeks for OBV and 4 weeks for RL2 and OBV. Future visits scheduled at . Pt discharged stable and ambulatory.

## 2023-05-10 NOTE — PATIENT INSTRUCTIONS
Understanding Round Ligament Pain in Pregnancy   Round ligament pain is a common problem in pregnancy. Ligaments are strong tissues that connect bones, muscles, and organs. There are 2 round ligaments. There is 1 on each side of the uterus. The top part of each ligament attaches to the upper side of the uterus. The bottom of each ligament attaches down in the pubic area. These ligaments help keep the uterus in place as you move around.     What causes round ligament pain in pregnancy?   As your uterus grows during pregnancy, the round ligaments are stretched and work harder when you move around. They may stretch too quickly when you stand up or bend or laugh. Nearby nerves may be irritated, or the ligaments may have a painful spasm.   Symptoms of ligament pain in pregnancy  The symptoms are sharp pains that last a few seconds. The pain may happen most often on the right side of the belly. It may happen in the hip, the lower belly, or even deep down in your pubic area. The pain may happen when you:     Move suddenly    Stand up    Walk    Roll over in bed    Laugh    Cough    Sneeze  Diagnosing round ligament pain in pregnancy   Your healthcare provider will ask about your symptoms and give you a physical exam. He or she may give you tests to check for other problems that can cause pain, such as an ovarian cyst or enlarged vein (varicocele). He or she will also check for signs of  labor or other pregnancy problems.   Treatment for round ligament pain in pregnancy   To help prevent pain:    Move slowly when you stand up, roll over, turn, or bend.    Don t stand for long periods of time.    Don t lift heavy objects.    Do gentle daily stretches of your hip joints.  When to call your healthcare provider  Call your healthcare provider right away if you have any of these:     Fever of 100.4 F (38 C) or higher    Pains that last more than a few minutes    Pain that gets worse    Bleeding, nausea, vomiting, or  other new symptoms  Yoovi last reviewed this educational content on 3/1/2020    1562-4341 [employer]. All rights reserved. This information is not intended as a substitute for professional medical care. Always follow your healthcare professional's instructions.          Relieving Back Pain During Pregnancy: Wall Stretch, Body Bend   Before trying these exercises, talk to your healthcare provider to make sure they are safe for you. Ask your healthcare provider how many times to do each exercise.   Wall stretch  This strengthens and loosens the muscles in your upper back:   1. Lean against a wall with a firm pillow or rolled towel under your shoulder blades. Your feet should be about 12 inches from the wall and shoulder-width apart. Point your chin down.  2. Breathe in. Push your shoulders, neck, and head against the wall. You will feel a stretch in your shoulders.  3. Hold for 5 counts. Then breathe out, and relax your shoulders and neck.   Body bend  This strengthens your back and buttocks muscles:   1. Stand with your legs shoulder-width apart. Put your hands on your upper thighs and bend your knees slightly.  2. Slowly bend forward at the hips. Push your hips back and keep your shoulders up. Make sure your back is straight. You ll feel a stretch in your upper thighs. You ll also feel your back muscles holding you in position.  3. Hold for 5 counts, then straighten.   Yoovi last reviewed this educational content on 7/1/2021 2000-2022 [employer]. All rights reserved. This information is not intended as a substitute for professional medical care. Always follow your healthcare professional's instructions.          Pregnancy: Planning Your Exercise Routine  While you re pregnant, an exercise routine helps both your mind and your body feel good. It tones your muscles and makes them stronger. It also gives you and your baby more oxygen.   The right exercise for you    Overall  conditioning is best for you and your baby. Try walking, swimming, or riding a stationary bike. Always warm up, cool down, and drink enough fluids. Keep a snack close by in case your blood sugar gets low. Discuss exercise choices with your healthcare provider. Talk about the following:     If you already exercise, find out how to adapt your routine while you re pregnant. Keep the intensity of the exercise moderate. As your pregnancy progresses, your center of gravity will change. Be careful to keep your balance.    Ask if there are any local prenatal exercise classes, such as yoga or water aerobics. Find out which prenatal exercise videos are good choices.    If you were not exercising before your pregnancy, find out the best way to start. Now is not the time to begin a new workout on your own. Start slowly. Listen to your body.    Ask which forms of exercise you should avoid. These may include risky activities like hot yoga, horseback riding, scuba diving, skiing, skating, and contact sports.  Pelvic tilts  These help strengthen your stomach muscles and low back. You can do pelvic tilts instead of sit-ups.     Do this exercise on your hands and knees.    Relax the back of your neck. Pull your stomach in until your low back flattens.    Hold for 30 seconds. Release. Repeat 10 times. Do this twice a day.  Kegel exercises  Kegel exercises strengthen the pelvic muscles. Doing Kegels daily helps prepare these muscles for delivery. Kegels also help ease your recovery. You exercise these muscles by tightening, holding, then relaxing them. To do 1 type of Kegel exercise, contract as if you were stopping your urine stream (but do it when you re not urinating). Hold for 10 seconds, then repeat 10 times, a few times a day.   Tips to add activity  Here are some tips to follow:    Park the car farther from a store and walk.    If you can, do errands on foot instead of driving.    Walk across the office to talk to someone in  person instead of calling.    While waiting for appointments, go up and down stairs or around the block.  Tips to stay active  Here are some tips to follow:    Maintain your routine. But exercise less intensely if you feel tired.    Base your workout on how you feel, not your heart rate. Heart rates aren t a good way to measure effort during pregnancy.    Don't exercise on your back after week 16.  What are the warning signs that I should stop exercising?  Stop exercising and call your healthcare provider if you have any of these symptoms:    Vaginal bleeding     Dizziness or feeling faint     Increased shortness of breath     Chest pain     Headache     Muscle weakness     Calf (back of the leg) pain or swelling      Uterine contractions or  labor     Decreased fetal movement     Fluid leaking (or gushing) from your vagina  Sandro last reviewed this educational content on 2021-2022 [employer]. All rights reserved. This information is not intended as a substitute for professional medical care. Always follow your healthcare professional's instructions.

## 2023-05-14 NOTE — OP NOTE
Procedure Date: 04/28/2023    PREOPERATIVE DIAGNOSIS:  Tertiary hyperparathyroidism.    POSTOPERATIVE DIAGNOSIS:  Tertiary hyperparathyroidism.    SURGICAL PROCEDURE PERFORMED:  Neck exploration, resection of 3.5 parathyroid glands.    SURGEON:  Dr Melissa Jones    ASSISTANT:  Dr Deny Mae    ANESTHESIA:  General endotracheal with nerve monitoring endotracheal tube.    COMPLICATIONS:  None.    ESTIMATED BLOOD LOSS:  Less than 5 mL.    CLINICAL INDICATIONS FOR THE PROCEDURE:  This is a 32-year-old female who has a pertinent past medical history of a kidney transplant.  She has subsequently developed tertiary hyperparathyroidism.  Her calcium has been more difficult to control, particularly during her pregnancy.  Her current pregnancy is at today's date, I believe, she is at 18 weeks based on her OB/GYN and was declared today for surgery.  We discussed the surgical procedure of a neck exploration, resection of parathyroid adenoma including 3.5 gland parathyroidectomy.  The procedure as well as the risks including but not limited to bleeding, infection, injury to the recurrent laryngeal nerve or nerves, potential permanent hypocalcemia or missed parathyroid adenoma, possible loss of airway.  The patient is aware of this, agreed to proceed with surgery, consent was obtained, the site was marked.    DETAILS OF PROCEDURE:  The patient was brought to the operating room in stable condition, placed on the operating table in supine position.  After appropriate general anesthesia was obtained, the patient was prepped and draped in sterile fashion.  Timeout was then performed.  A pre-incision parathyroid hormone level was obtained.  A 4 cm incision was made over the anterior neck following a natural skin crease.  The platysma was then divided and subplatysmal planes were then created.  Although a localizing study only localized a single parathyroid gland, my concern was that she did have 4 enlarged parathyroid glands  "based on her previous disease process.  We evaluated the right neck first.  Right lobe of the thyroid gland was retracted medially.  We identified a very enlarged right inferior parathyroid gland.  However, prior to resecting this, we then followed the recurrent laryngeal nerve from an inferior-to-superior manner, identified a large right superior parathyroid gland.  The right superior parathyroid gland was dissected, its blood supply was clipped and then bipolar cauterized and sent for frozen section.  Pathology confirmed this to be a hypercellular parathyroid gland.  We then evaluated the left neck and found 2 enlarged parathyroid glands on the left as well.  At this point, because the left inferior appeared to be the most \"normal\" and in a good position, we elected to keep half of that parathyroid gland.  The right inferior parathyroid gland was then resected, sent for frozen section.  The right recurrent laryngeal nerve was confirmed to be intact visibly as well as with nerve stimulation.  The left superior parathyroid gland was then resected, blood supply was clipped and then divided.  The left recurrent laryngeal nerve was identified and then one-half of the left inferior parathyroid gland was resected.  The remaining half of the parathyroid gland was very vascular and viable and then was sewn to the inferior portion of the left lobe of the thyroid using a 4-0 Prolene suture and marked with 3 sequential red small clips.  Valsalva maneuver was performed, no bleeding was identified.  Fibrillar was then placed in the operative bed.  Strap muscles were approximated at the midline using a 3-0 chromic interrupted suture.  The platysma was approximated using a 3-0 chromic interrupted suture.  The skin incision was approximated using a 3-0 chromic interrupted suture.  The patient was then extubated and returned to the recovery room in stable condition.  I was present for the entire surgical procedure.    Melissa TALAMANTES" MD Karen        D: 2023   T: 2023   MT: TU    Name:     LIZZETTE PALMER  MRN:      [id number]        Account:        [id number]   :      1990           Procedure Date: 2023     Document: [id number]

## 2023-05-15 ENCOUNTER — OFFICE VISIT (OUTPATIENT)
Dept: OTOLARYNGOLOGY | Facility: CLINIC | Age: 33
End: 2023-05-15
Payer: MEDICARE

## 2023-05-15 ENCOUNTER — LAB (OUTPATIENT)
Dept: LAB | Facility: HOSPITAL | Age: 33
End: 2023-05-15
Payer: MEDICARE

## 2023-05-15 VITALS
OXYGEN SATURATION: 99 % | SYSTOLIC BLOOD PRESSURE: 113 MMHG | WEIGHT: 175 LBS | HEIGHT: 60 IN | TEMPERATURE: 98.6 F | DIASTOLIC BLOOD PRESSURE: 59 MMHG | HEART RATE: 75 BPM | BODY MASS INDEX: 34.36 KG/M2

## 2023-05-15 DIAGNOSIS — Z94.0 KIDNEY REPLACED BY TRANSPLANT: ICD-10-CM

## 2023-05-15 DIAGNOSIS — E21.3 HYPERPARATHYROIDISM (H): Primary | ICD-10-CM

## 2023-05-15 DIAGNOSIS — N25.81 SECONDARY RENAL HYPERPARATHYROIDISM (H): ICD-10-CM

## 2023-05-15 DIAGNOSIS — E58 CALCIUM DEFICIENCY: ICD-10-CM

## 2023-05-15 DIAGNOSIS — Z34.90 PREGNANT: ICD-10-CM

## 2023-05-15 DIAGNOSIS — E03.9 ACQUIRED HYPOTHYROIDISM: ICD-10-CM

## 2023-05-15 DIAGNOSIS — Z48.298 AFTERCARE FOLLOWING ORGAN TRANSPLANT: ICD-10-CM

## 2023-05-15 LAB
ALBUMIN MFR UR ELPH: 4.3 MG/DL (ref 1–14)
ANION GAP SERPL CALCULATED.3IONS-SCNC: 13 MMOL/L (ref 7–15)
BUN SERPL-MCNC: 31.1 MG/DL (ref 6–20)
CALCIUM SERPL-MCNC: 9.7 MG/DL (ref 8.6–10)
CALCIUM SERPL-MCNC: 9.7 MG/DL (ref 8.6–10)
CALCIUM, IONIZED MEASURED: 1.32 MMOL/L (ref 1.11–1.3)
CHLORIDE SERPL-SCNC: 105 MMOL/L (ref 98–107)
CREAT SERPL-MCNC: 1.47 MG/DL (ref 0.51–0.95)
CREAT UR-MCNC: 66 MG/DL
DEPRECATED HCO3 PLAS-SCNC: 20 MMOL/L (ref 22–29)
ERYTHROCYTE [DISTWIDTH] IN BLOOD BY AUTOMATED COUNT: 12.7 % (ref 10–15)
GFR SERPL CREATININE-BSD FRML MDRD: 49 ML/MIN/1.73M2
GLUCOSE SERPL-MCNC: 88 MG/DL (ref 70–99)
HCT VFR BLD AUTO: 25.8 % (ref 35–47)
HGB BLD-MCNC: 8.4 G/DL (ref 11.7–15.7)
ION CA PH 7.4: 1.29 MMOL/L (ref 1.11–1.3)
MCH RBC QN AUTO: 30.3 PG (ref 26.5–33)
MCHC RBC AUTO-ENTMCNC: 32.6 G/DL (ref 31.5–36.5)
MCV RBC AUTO: 93 FL (ref 78–100)
PH: 7.37 (ref 7.35–7.45)
PLATELET # BLD AUTO: 179 10E3/UL (ref 150–450)
POTASSIUM SERPL-SCNC: 4 MMOL/L (ref 3.4–5.3)
PROT/CREAT 24H UR: 0.07 MG/MG CR (ref 0–0.2)
RBC # BLD AUTO: 2.77 10E6/UL (ref 3.8–5.2)
SODIUM SERPL-SCNC: 138 MMOL/L (ref 136–145)
T3FREE SERPL-MCNC: 2 PG/ML (ref 2–4.4)
T4 FREE SERPL-MCNC: 0.94 NG/DL (ref 0.9–1.7)
TACROLIMUS BLD-MCNC: 3.9 UG/L (ref 5–15)
TME LAST DOSE: ABNORMAL H
TME LAST DOSE: ABNORMAL H
TSH SERPL DL<=0.005 MIU/L-ACNC: 1.77 UIU/ML (ref 0.3–4.2)
WBC # BLD AUTO: 9.1 10E3/UL (ref 4–11)

## 2023-05-15 PROCEDURE — 84443 ASSAY THYROID STIM HORMONE: CPT

## 2023-05-15 PROCEDURE — 84439 ASSAY OF FREE THYROXINE: CPT

## 2023-05-15 PROCEDURE — 84481 FREE ASSAY (FT-3): CPT

## 2023-05-15 PROCEDURE — 80197 ASSAY OF TACROLIMUS: CPT

## 2023-05-15 PROCEDURE — 85027 COMPLETE CBC AUTOMATED: CPT

## 2023-05-15 PROCEDURE — 82330 ASSAY OF CALCIUM: CPT

## 2023-05-15 PROCEDURE — 36415 COLL VENOUS BLD VENIPUNCTURE: CPT

## 2023-05-15 PROCEDURE — 99024 POSTOP FOLLOW-UP VISIT: CPT | Performed by: SURGERY

## 2023-05-15 PROCEDURE — 80048 BASIC METABOLIC PNL TOTAL CA: CPT

## 2023-05-15 PROCEDURE — 84156 ASSAY OF PROTEIN URINE: CPT

## 2023-05-15 RX ORDER — CALCIUM CARBONATE 1250 MG/5ML
5 SUSPENSION ORAL 2 TIMES DAILY WITH MEALS
COMMUNITY
End: 2023-06-23

## 2023-05-15 ASSESSMENT — PAIN SCALES - GENERAL: PAINLEVEL: NO PAIN (0)

## 2023-05-15 NOTE — LETTER
5/15/2023       RE: Mona Wagner  [address]  [place] [address]     Dear Colleague,    Thank you for referring your patient, Mona Wagner, to the [facility] [place] [facility]. Please see a copy of my visit note below.    2-week postop visit status post resection 3-1/2 parathyroid glands.  Remaining parathyroid gland is one half of the left inferior.  20-minute post excision 3-1/2 gland parathyroid resection parathyroid hormone level was 19.    The patient was operated on at week 19 of her pregnancy.  She has been followed closely by OB and has been doing quite well.    Since the surgery the patient has no problems with voice quality inspiration or swallowing.  No symptoms of hypocalcemia.    On physical exam the wound is healing well the Dermabond was removed and the sutures were clipped at the skin edges.  There is no evidence of hematoma seroma or infection.    I reviewed with the patient her intraoperative parathyroid hormone levels and postoperative labs as well as pathology.    Plan:  Reviewed with the patient wound management will massage  She is being followed closely by OB and endocrinology who will continue doing so.    Today we will check a calcium vitamin D and parathyroid hormone and discussed with her other caregivers as to what is the best plan and treatment.    Melissa Jones MD

## 2023-05-15 NOTE — NURSING NOTE
Chief Complaint   Patient presents with     RECHECK     2 week post op      Blood pressure 113/59, pulse 75, temperature 98.6  F (37  C), height 1.524 m (5'), weight 79.4 kg (175 lb), last menstrual period 12/16/2022, SpO2 99 %, not currently breastfeeding.    Corey Daniels LPN

## 2023-05-16 ENCOUNTER — TELEPHONE (OUTPATIENT)
Dept: TRANSPLANT | Facility: CLINIC | Age: 33
End: 2023-05-16
Payer: MEDICAID

## 2023-05-16 DIAGNOSIS — Z94.0 KIDNEY REPLACED BY TRANSPLANT: ICD-10-CM

## 2023-05-16 DIAGNOSIS — Z94.0 KIDNEY REPLACED BY TRANSPLANT: Primary | ICD-10-CM

## 2023-05-16 DIAGNOSIS — D64.9 ANEMIA: ICD-10-CM

## 2023-05-16 DIAGNOSIS — Z94.0 KIDNEY TRANSPLANTED: Primary | ICD-10-CM

## 2023-05-16 RX ORDER — TACROLIMUS 5 MG/1
5 CAPSULE ORAL 2 TIMES DAILY
Qty: 60 CAPSULE | Refills: 11 | Status: SHIPPED | OUTPATIENT
Start: 2023-05-16 | End: 2023-05-25

## 2023-05-16 RX ORDER — TACROLIMUS 1 MG/1
1 CAPSULE ORAL 2 TIMES DAILY
Qty: 180 CAPSULE | Refills: 3 | Status: SHIPPED | OUTPATIENT
Start: 2023-05-16 | End: 2023-05-25

## 2023-05-16 NOTE — TELEPHONE ENCOUNTER
Per Dr Mcintyre's recommendation  You can add mag but I think it is from hypocalcemia. Please increase the calcium suspension to 7.5mL BID.    MAR updated and Mg level added to 5/15 labs sample.

## 2023-05-16 NOTE — TELEPHONE ENCOUNTER
Review labs results with Mona, of concern is her Hgb trend, now at  Hgb 8.4. She does report fatigue and heart pounding with exertion. Mona has been eating more meat in the hopes of improving her Hgb. Discussed Dr Mcintyre's previous recommendation to start CHARLINE therapy. Mona has  been hesitant to start therapy as she is pregnant, but has agreed to talk to the Anemia Clinic team, Anemia referral placed.     Mona Cr baseline is 0.8-1.1., most recent 4.7. She is concerned about the upward trend. Discussed possible causes, such as; dehydration, meds, diet, stress of pregnancy. Mona is drinking approx 80 oz/daily.     Mona Tacro level 3.9, at 12 hours, goal 4-6  dose was adjusted. Will recheck level in 1 week.     Mona has had episodes of numbness and tingling, and will take Tums which she feels helps. She is now wondering if these symptoms are due to a low Mg level. She received a supplement while inpatient for a Mg 1.4. Supplement was dc'd at time of discharge. Last Mg level 1.8 (4/29).     Will update Dr Mcintyre.

## 2023-05-16 NOTE — TELEPHONE ENCOUNTER
Dr Mcintyre , requested that a renal US to r/o hydronephrosis. Order placed. Mona given the number to call to schedule appt.

## 2023-05-17 ENCOUNTER — TELEPHONE (OUTPATIENT)
Dept: PHARMACY | Facility: CLINIC | Age: 33
End: 2023-05-17
Payer: MEDICAID

## 2023-05-17 DIAGNOSIS — N18.31 ANEMIA OF CHRONIC RENAL FAILURE, STAGE 3A (H): ICD-10-CM

## 2023-05-17 DIAGNOSIS — N18.31 STAGE 3A CHRONIC KIDNEY DISEASE (H): Primary | ICD-10-CM

## 2023-05-17 DIAGNOSIS — D63.1 ANEMIA OF CHRONIC RENAL FAILURE, STAGE 3A (H): ICD-10-CM

## 2023-05-17 DIAGNOSIS — Z94.0 KIDNEY REPLACED BY TRANSPLANT: ICD-10-CM

## 2023-05-17 NOTE — TELEPHONE ENCOUNTER
Anemia Management Note - Enrollment  SUBJECTIVE/OBJECTIVE:    Referred by Dr. Gelacio Mcintyre on 2023  Primary Diagnosis: Anemia in Chronic Kidney Disease (N18.3, D63.1)   3a  Secondary Diagnosis:  Chronic Kidney Disease, Stage 3 (N18.3)  3a  Hgb goal range:  9-10  Kidney Tx: 8/3/2019  Epo/Darbo: TBD  Iron regimen:  Prenatal vit with Iron.   *Elevated Ferritin levels.   Labs : 2024  Recent CHARLINE use, transfusion, IV iron: Aranesp .  RX/TX plans : TBD    Hx of DVT (), High Risk Pregnancy (2nd trimester).    Contact: OK to speak with Stephan Wagner (father) and Savanah Wagner (sister) regarding Medical Information per consent to communicate dated 4/3/2020.        Latest Ref Rng & Units 3/27/2023    10:19 AM 4/10/2023    10:28 AM 2023    10:35 AM 5/3/2023     9:05 AM 2023     9:15 AM 2023     9:26 AM 5/15/2023    10:11 AM   Anemia   Hemoglobin 11.7 - 15.7 g/dL 11.4   10.1   9.8   9.6   9.5   8.9   8.4     Ferritin 6 - 175 ng/mL   1,770             BP Readings from Last 3 Encounters:   05/15/23 113/59   05/10/23 121/80   23 102/52     Wt Readings from Last 2 Encounters:   05/15/23 175 lb (79.4 kg)   23 172 lb 13.5 oz (78.4 kg)     Current Outpatient Medications   Medication Sig Dispense Refill     acetaminophen (TYLENOL) 325 MG tablet Take 2 tablets (650 mg) by mouth every 4 hours as needed for mild pain 100 tablet 3     aspirin (ASA) 81 MG chewable tablet Take 1 tablet (81 mg) by mouth daily 90 tablet 1     azaTHIOprine (IMURAN) 50 MG tablet Take 3 tablets (150 mg) by mouth daily (Patient taking differently: Take 150 mg by mouth every evening) 270 tablet 3     calcitRIOL (ROCALTROL) 0.25 MCG capsule Take 1 capsule (0.25 mcg) by mouth daily 28 capsule 0     calcium carbonate 1250 MG/5ML SUSP suspension Take 7.5 mLs by mouth 2 times daily (with meals) Take 7.5 ml twice daily        cyanocobalamin (VITAMIN B-12) 1000 MCG tablet Take 1 tablet (1,000 mcg) by mouth daily 90 tablet 1      EPINEPHrine (ANY BX GENERIC EQUIV) 0.3 MG/0.3ML injection 2-pack        famotidine (PEPCID) 20 MG tablet Take 1 tablet (20 mg) by mouth 2 times daily (Patient taking differently: Take 20 mg by mouth as needed) 180 tablet 3     levothyroxine (SYNTHROID/LEVOTHROID) 25 MCG tablet TAKE 1.5 tablet daily 6 days per week and 1 tablet daily on the 7th day ( will be titrating up) (Patient taking differently: Take 25 mcg by mouth every morning TAKE 1.5 tablet daily 6 days per week and 1 tablet daily on the 7th day ( will be titrating up)) 150 tablet 3     polyethylene glycol (MIRALAX) 17 GM/Dose powder Take 17 g (1 capful) by mouth daily as needed for constipation 507 g 3     Prenatal Vit-Fe Fumarate-FA (PRENATAL MULTIVITAMIN W/IRON) 27-0.8 MG tablet Take 1 tablet by mouth daily (Patient taking differently: Take 1 tablet by mouth every evening) 90 tablet 3     simethicone (MYLICON) 125 MG chewable tablet Take 1 tablet (125 mg) by mouth 4 times daily as needed for intestinal gas 120 tablet 3     tacrolimus (GENERIC EQUIVALENT) 1 MG capsule Take 1 capsule (1 mg) by mouth 2 times daily Total dose = 6 mg twice daily 180 capsule 3     tacrolimus (GENERIC EQUIVALENT) 5 MG capsule Take 1 capsule (5 mg) by mouth 2 times daily Total dose = 6 mg every AM and 6 mg every PM 60 capsule 11     ASSESSMENT:  Hgb Not at goal/Initiation of therapy   Ferritin: Elevated (>1000ng/mL)  TSat: not at goal (>30%) but ferritin >1000ng/mL.  PO iron not indicated at this time per anemia protocol.    Iron regimen recommended: Prenatal Vit with Iron.   Recommended CHARLINE regimen: TBD  Blood Pressure: Stable    PLAN:  1. Patient called today for enrollment in Anemia Management Service.  2. Discussed:  anemia overview, monitoring service and goal hemoglobin range and rationale and risks of CHARLINE blood clots, stroke and increase in blood pressure  3. Dose location: in clinic TBD  4. Labs: [place]  5. Pharmacy: [facility]    Spoke with Mona. She requested that I  contact her midwife prior to starting Aranesp to make sure she is ok with it.  Message sent to Sonam Toledo.     Next call date:  5/19/23    Manjula Mckinnon RN   [facility]  [address] [address]   [email or web address]   Office : [phone]  Fax: [phone]

## 2023-05-18 ENCOUNTER — HOSPITAL ENCOUNTER (OUTPATIENT)
Dept: ULTRASOUND IMAGING | Facility: HOSPITAL | Age: 33
Discharge: HOME | End: 2023-05-18
Attending: INTERNAL MEDICINE | Admitting: INTERNAL MEDICINE
Payer: MEDICARE

## 2023-05-18 ENCOUNTER — TELEPHONE (OUTPATIENT)
Dept: TRANSPLANT | Facility: CLINIC | Age: 33
End: 2023-05-18

## 2023-05-18 DIAGNOSIS — O09.90 HIGH-RISK PREGNANCY: ICD-10-CM

## 2023-05-18 DIAGNOSIS — D64.9 ANEMIA: Primary | ICD-10-CM

## 2023-05-18 DIAGNOSIS — Z48.298 AFTERCARE FOLLOWING ORGAN TRANSPLANT: ICD-10-CM

## 2023-05-18 DIAGNOSIS — Z94.0 KIDNEY TRANSPLANTED: ICD-10-CM

## 2023-05-18 PROCEDURE — 76775 US EXAM ABDO BACK WALL LIM: CPT

## 2023-05-18 NOTE — TELEPHONE ENCOUNTER
Gelacio Mcintyre MD Larson, Mary J, RN 3 days ago     Lets place anemia referral for CHARLINE and increase calcitriol to 0.5mcg daily. Continue the 1250mg calcium carbonate BID in between meals.

## 2023-05-19 ENCOUNTER — TELEPHONE (OUTPATIENT)
Dept: PHARMACY | Facility: CLINIC | Age: 33
End: 2023-05-19
Payer: MEDICAID

## 2023-05-19 NOTE — TELEPHONE ENCOUNTER
Follow-up with anemia management service:    Spoke with Mona. She wants to hold off on CHARLINE. She is having labs drawn again Monday and would like to check in at that time.   If she does agree to start the Aranesp, she prefers to go to [facility].     Mona is very hesitant to get started on the CHARLINE.  Informed Mona that her OB and Dr. Mcintyre have both agreed that it would be best for her to get started on the Araenesp.  We would like to try and avoid Blood Transfusions.         Latest Ref Rng & Units 3/27/2023    10:19 AM 4/10/2023    10:28 AM 4/25/2023    10:35 AM 5/3/2023     9:05 AM 5/5/2023     9:15 AM 5/9/2023     9:26 AM 5/15/2023    10:11 AM   Anemia   Hemoglobin 11.7 - 15.7 g/dL 11.4   10.1   9.8   9.6   9.5   8.9   8.4     Ferritin 6 - 175 ng/mL   1,770               Follow-up call date: 5/23/23    Manjula Mckinnon RN   Anemia Services  [address] [address]   [email or web address]   Office : [phone]  Fax: [phone]

## 2023-05-22 ENCOUNTER — TELEPHONE (OUTPATIENT)
Dept: TRANSPLANT | Facility: CLINIC | Age: 33
End: 2023-05-22

## 2023-05-22 ENCOUNTER — LAB (OUTPATIENT)
Dept: LAB | Facility: HOSPITAL | Age: 33
End: 2023-05-22
Payer: MEDICARE

## 2023-05-22 DIAGNOSIS — Z94.0 KIDNEY REPLACED BY TRANSPLANT: ICD-10-CM

## 2023-05-22 DIAGNOSIS — N18.31 ANEMIA OF CHRONIC RENAL FAILURE, STAGE 3A (H): ICD-10-CM

## 2023-05-22 DIAGNOSIS — D63.1 ANEMIA OF CHRONIC RENAL FAILURE, STAGE 3A (H): ICD-10-CM

## 2023-05-22 DIAGNOSIS — N18.31 STAGE 3A CHRONIC KIDNEY DISEASE (H): ICD-10-CM

## 2023-05-22 DIAGNOSIS — Z48.298 AFTERCARE FOLLOWING ORGAN TRANSPLANT: ICD-10-CM

## 2023-05-22 DIAGNOSIS — E86.0 DEHYDRATION: ICD-10-CM

## 2023-05-22 DIAGNOSIS — E58 CALCIUM DEFICIENCY: ICD-10-CM

## 2023-05-22 DIAGNOSIS — O09.92 HIGH-RISK PREGNANCY IN SECOND TRIMESTER: Primary | ICD-10-CM

## 2023-05-22 DIAGNOSIS — Z48.298 AFTERCARE FOLLOWING ORGAN TRANSPLANT: Primary | ICD-10-CM

## 2023-05-22 LAB
ANION GAP SERPL CALCULATED.3IONS-SCNC: 14 MMOL/L (ref 7–15)
BUN SERPL-MCNC: 37.2 MG/DL (ref 6–20)
CALCIUM SERPL-MCNC: 9.7 MG/DL (ref 8.6–10)
CALCIUM, IONIZED MEASURED: 1.26 MMOL/L (ref 1.11–1.3)
CHLORIDE SERPL-SCNC: 105 MMOL/L (ref 98–107)
CREAT SERPL-MCNC: 1.69 MG/DL (ref 0.51–0.95)
DEPRECATED HCO3 PLAS-SCNC: 18 MMOL/L (ref 22–29)
ERYTHROCYTE [DISTWIDTH] IN BLOOD BY AUTOMATED COUNT: 13.2 % (ref 10–15)
FERRITIN SERPL-MCNC: 1618 NG/ML (ref 6–175)
GFR SERPL CREATININE-BSD FRML MDRD: 41 ML/MIN/1.73M2
GLUCOSE SERPL-MCNC: 91 MG/DL (ref 70–99)
HCT VFR BLD AUTO: 25.6 % (ref 35–47)
HGB BLD-MCNC: 8.4 G/DL (ref 11.7–15.7)
ION CA PH 7.4: 1.25 MMOL/L (ref 1.11–1.3)
IRON BINDING CAPACITY (ROCHE): 195 UG/DL (ref 240–430)
IRON SATN MFR SERPL: 41 % (ref 15–46)
IRON SERPL-MCNC: 80 UG/DL (ref 37–145)
MAGNESIUM SERPL-MCNC: 1.3 MG/DL (ref 1.7–2.3)
MCH RBC QN AUTO: 30.8 PG (ref 26.5–33)
MCHC RBC AUTO-ENTMCNC: 32.8 G/DL (ref 31.5–36.5)
MCV RBC AUTO: 94 FL (ref 78–100)
PH: 7.38 (ref 7.35–7.45)
PLATELET # BLD AUTO: 182 10E3/UL (ref 150–450)
POTASSIUM SERPL-SCNC: 4.4 MMOL/L (ref 3.4–5.3)
RBC # BLD AUTO: 2.73 10E6/UL (ref 3.8–5.2)
SODIUM SERPL-SCNC: 137 MMOL/L (ref 136–145)
TACROLIMUS BLD-MCNC: 5 UG/L (ref 5–15)
TME LAST DOSE: NORMAL H
TME LAST DOSE: NORMAL H
WBC # BLD AUTO: 10.1 10E3/UL (ref 4–11)

## 2023-05-22 PROCEDURE — 83550 IRON BINDING TEST: CPT

## 2023-05-22 PROCEDURE — 36415 COLL VENOUS BLD VENIPUNCTURE: CPT

## 2023-05-22 PROCEDURE — 80197 ASSAY OF TACROLIMUS: CPT

## 2023-05-22 PROCEDURE — 80048 BASIC METABOLIC PNL TOTAL CA: CPT

## 2023-05-22 PROCEDURE — 85014 HEMATOCRIT: CPT

## 2023-05-22 PROCEDURE — 82728 ASSAY OF FERRITIN: CPT

## 2023-05-22 PROCEDURE — 82330 ASSAY OF CALCIUM: CPT

## 2023-05-22 PROCEDURE — 83735 ASSAY OF MAGNESIUM: CPT

## 2023-05-22 RX ORDER — DIPHENHYDRAMINE HYDROCHLORIDE 50 MG/ML
50 INJECTION INTRAMUSCULAR; INTRAVENOUS
Status: CANCELLED
Start: 2023-05-22

## 2023-05-22 RX ORDER — HEPARIN SODIUM,PORCINE 10 UNIT/ML
5 VIAL (ML) INTRAVENOUS
Status: CANCELLED | OUTPATIENT
Start: 2023-05-22

## 2023-05-22 RX ORDER — ALBUTEROL SULFATE 90 UG/1
1-2 AEROSOL, METERED RESPIRATORY (INHALATION)
Status: CANCELLED
Start: 2023-05-22

## 2023-05-22 RX ORDER — HEPARIN SODIUM (PORCINE) LOCK FLUSH IV SOLN 100 UNIT/ML 100 UNIT/ML
5 SOLUTION INTRAVENOUS
Status: CANCELLED | OUTPATIENT
Start: 2023-05-22

## 2023-05-22 RX ORDER — EPINEPHRINE 1 MG/ML
0.3 INJECTION, SOLUTION, CONCENTRATE INTRAVENOUS EVERY 5 MIN PRN
Status: CANCELLED | OUTPATIENT
Start: 2023-05-22

## 2023-05-22 RX ORDER — ALBUTEROL SULFATE 0.83 MG/ML
2.5 SOLUTION RESPIRATORY (INHALATION)
Status: CANCELLED | OUTPATIENT
Start: 2023-05-22

## 2023-05-22 RX ORDER — MEPERIDINE HYDROCHLORIDE 25 MG/ML
25 INJECTION INTRAMUSCULAR; INTRAVENOUS; SUBCUTANEOUS EVERY 30 MIN PRN
Status: CANCELLED | OUTPATIENT
Start: 2023-05-22

## 2023-05-22 RX ORDER — METHYLPREDNISOLONE SODIUM SUCCINATE 125 MG/2ML
125 INJECTION, POWDER, LYOPHILIZED, FOR SOLUTION INTRAMUSCULAR; INTRAVENOUS
Status: CANCELLED
Start: 2023-05-22

## 2023-05-22 NOTE — TELEPHONE ENCOUNTER
Call from  Dr Gelacio Mcintyre      Issue- increase creatinine     Requesting 1LNS  of fluid   Placed orders for therapy plan IV with Dr Gelacio Mcintyre  (fluids on May 23,)     Concern about a hydro with  transplant  kidney  /pregnancy   Requesting labs twice per week       Attempted to call Mona to update on increase frequency of labs

## 2023-05-23 ENCOUNTER — TELEPHONE (OUTPATIENT)
Dept: PHARMACY | Facility: CLINIC | Age: 33
End: 2023-05-23
Payer: MEDICAID

## 2023-05-23 ENCOUNTER — TELEPHONE (OUTPATIENT)
Dept: ENDOCRINOLOGY | Facility: CLINIC | Age: 33
End: 2023-05-23
Payer: MEDICAID

## 2023-05-23 ENCOUNTER — INFUSION THERAPY VISIT (OUTPATIENT)
Dept: INFUSION THERAPY | Facility: CLINIC | Age: 33
End: 2023-05-23
Attending: INTERNAL MEDICINE
Payer: MEDICARE

## 2023-05-23 ENCOUNTER — TELEPHONE (OUTPATIENT)
Dept: LAB | Facility: CLINIC | Age: 33
End: 2023-05-23

## 2023-05-23 VITALS
OXYGEN SATURATION: 99 % | RESPIRATION RATE: 18 BRPM | SYSTOLIC BLOOD PRESSURE: 118 MMHG | HEART RATE: 86 BPM | DIASTOLIC BLOOD PRESSURE: 75 MMHG | TEMPERATURE: 98 F

## 2023-05-23 DIAGNOSIS — Z94.0 KIDNEY REPLACED BY TRANSPLANT: Primary | ICD-10-CM

## 2023-05-23 DIAGNOSIS — E86.0 DEHYDRATION: ICD-10-CM

## 2023-05-23 DIAGNOSIS — Z48.298 AFTERCARE FOLLOWING ORGAN TRANSPLANT: Primary | ICD-10-CM

## 2023-05-23 DIAGNOSIS — N18.31 STAGE 3A CHRONIC KIDNEY DISEASE (H): ICD-10-CM

## 2023-05-23 DIAGNOSIS — D63.1 ANEMIA OF CHRONIC RENAL FAILURE, STAGE 3A (H): ICD-10-CM

## 2023-05-23 DIAGNOSIS — R79.0 LOW MAGNESIUM LEVEL: ICD-10-CM

## 2023-05-23 DIAGNOSIS — Z94.0 KIDNEY REPLACED BY TRANSPLANT: ICD-10-CM

## 2023-05-23 DIAGNOSIS — E06.3 HYPOTHYROIDISM DUE TO HASHIMOTO'S THYROIDITIS: ICD-10-CM

## 2023-05-23 DIAGNOSIS — E03.9 ACQUIRED HYPOTHYROIDISM: ICD-10-CM

## 2023-05-23 DIAGNOSIS — N18.31 ANEMIA OF CHRONIC RENAL FAILURE, STAGE 3A (H): ICD-10-CM

## 2023-05-23 PROBLEM — N18.9 ANEMIA OF CHRONIC RENAL FAILURE: Status: ACTIVE | Noted: 2023-05-23

## 2023-05-23 PROCEDURE — 250N000011 HC RX IP 250 OP 636: Mod: EC | Performed by: INTERNAL MEDICINE

## 2023-05-23 PROCEDURE — 258N000003 HC RX IP 258 OP 636: Performed by: INTERNAL MEDICINE

## 2023-05-23 PROCEDURE — 96372 THER/PROPH/DIAG INJ SC/IM: CPT | Performed by: INTERNAL MEDICINE

## 2023-05-23 PROCEDURE — 999N000248 HC STATISTIC IV INSERT WITH US BY RN

## 2023-05-23 RX ORDER — EPINEPHRINE 1 MG/ML
0.3 INJECTION, SOLUTION INTRAMUSCULAR; SUBCUTANEOUS EVERY 5 MIN PRN
Status: CANCELLED | OUTPATIENT
Start: 2023-05-23

## 2023-05-23 RX ORDER — DIPHENHYDRAMINE HYDROCHLORIDE 50 MG/ML
50 INJECTION INTRAMUSCULAR; INTRAVENOUS
Status: CANCELLED
Start: 2023-05-23

## 2023-05-23 RX ORDER — ALBUTEROL SULFATE 90 UG/1
1-2 AEROSOL, METERED RESPIRATORY (INHALATION)
Status: CANCELLED
Start: 2023-05-23

## 2023-05-23 RX ORDER — MEPERIDINE HYDROCHLORIDE 25 MG/ML
25 INJECTION INTRAMUSCULAR; INTRAVENOUS; SUBCUTANEOUS EVERY 30 MIN PRN
Status: CANCELLED | OUTPATIENT
Start: 2023-05-23

## 2023-05-23 RX ORDER — HEPARIN SODIUM (PORCINE) LOCK FLUSH IV SOLN 100 UNIT/ML 100 UNIT/ML
5 SOLUTION INTRAVENOUS
Status: CANCELLED | OUTPATIENT
Start: 2023-05-23

## 2023-05-23 RX ORDER — ALBUTEROL SULFATE 0.83 MG/ML
2.5 SOLUTION RESPIRATORY (INHALATION)
Status: CANCELLED | OUTPATIENT
Start: 2023-05-23

## 2023-05-23 RX ORDER — METHYLPREDNISOLONE SODIUM SUCCINATE 125 MG/2ML
125 INJECTION, POWDER, LYOPHILIZED, FOR SOLUTION INTRAMUSCULAR; INTRAVENOUS
Status: CANCELLED
Start: 2023-05-23

## 2023-05-23 RX ORDER — MAGNESIUM OXIDE 400 MG/1
400 TABLET ORAL 2 TIMES DAILY
Qty: 60 TABLET | Refills: 11 | Status: ON HOLD | OUTPATIENT
Start: 2023-05-23 | End: 2023-06-16

## 2023-05-23 RX ORDER — LEVOTHYROXINE SODIUM 25 UG/1
TABLET ORAL
Qty: 135 TABLET | Refills: 3 | Status: ON HOLD | OUTPATIENT
Start: 2023-05-23 | End: 2023-06-16

## 2023-05-23 RX ORDER — EPINEPHRINE 1 MG/ML
0.3 INJECTION, SOLUTION, CONCENTRATE INTRAVENOUS EVERY 5 MIN PRN
Status: CANCELLED | OUTPATIENT
Start: 2023-05-23

## 2023-05-23 RX ORDER — HEPARIN SODIUM,PORCINE 10 UNIT/ML
5 VIAL (ML) INTRAVENOUS
Status: CANCELLED | OUTPATIENT
Start: 2023-05-23

## 2023-05-23 RX ADMIN — DARBEPOETIN ALFA 40 MCG: 40 INJECTION, SOLUTION INTRAVENOUS; SUBCUTANEOUS at 15:02

## 2023-05-23 RX ADMIN — SODIUM CHLORIDE 1000 ML: 9 INJECTION, SOLUTION INTRAVENOUS at 13:54

## 2023-05-23 NOTE — PATIENT INSTRUCTIONS
Dear Mona Wagner    Thank you for choosing [facility] ([facility]) for your infusion and injection.  The following information is a summary of our appointment as well as important reminders.      We look forward in seeing you on your next appointment here at [facility] ([facility]).  Please don t hesitate to call us at [phone] to reschedule any of your appointments or to speak with one of the [facility] registered nurses.  It was a pleasure taking care of you today.    Sincerely,    [facility]  [address] [place] [address]  [id number]  Phone:  [phone]

## 2023-05-23 NOTE — TELEPHONE ENCOUNTER
Follow-up with anemia management service:    Spoke with Mona. She would like to start the Aranesp. Prefers to get injections at [facility].   Would like to get started today at [facility] if possible while getting fluids and then start at [facility] in 2 weeks.     Mona was concerned about her Ferritin level.  She was not sure if her prenatal vitamins have Iron in them or not.   Advised Mona to speak with her OB at her appt 5/24/23.         Latest Ref Rng & Units 4/25/2023    10:35 AM 5/3/2023     9:05 AM 5/5/2023     9:15 AM 5/9/2023     9:26 AM 5/15/2023    10:11 AM 5/22/2023    10:21 AM 5/22/2023    10:23 AM   Anemia   Hemoglobin 11.7 - 15.7 g/dL 9.8   9.6   9.5   8.9   8.4   8.4      Ferritin 6 - 175 ng/mL 1,770        1,618           Follow-up call date: 5/24/23    Manjula Mckinnon RN   Anemia Services  [address] [address]   [email or web address]   Office : [phone]  Fax: [phone]

## 2023-05-23 NOTE — TELEPHONE ENCOUNTER
Contacted Mona to discuss labs results, recommendation and her current health concerns.     1. Mag level 1.3, Dr Mcintyre recommended treating with Mag oxide 400 mg bid. Mona aware and agrees with this plan. Script sent to local pharmacy.  Recheck level next week.     2. Hgb 8.4, anemia referral placed on 5/16. Mona has been hesitant to start CHARLINE, she has been working with Gwen from [facility] and the plan is to recheck Hgb which came back unchanged at 8.4. Mona say she is agreeable to starting CHARLINE therapy.     3. Elevated Ferritin 1,618, Mona is going to discuss this with Gwen from [facility].     4. Numbness and tingling, she has been taking calcium carbonate suspension but only taking 5 ml for the past week instead of the 7.5 ml prescribed. Her calcium level was 9.7 on 5/22 and 5/17, she does not want to take more then the 5 ml for fear that it will go too high. Dr Mcintyre has recommended that she increase her Calcitriol from 0.25 mcq to 0.5 mcg, again she hesitant to do so as she feel her labs are ok.      5. Elevated Cr of 1.69, she had reported that she was drinking 4-5 bottle of fluid per day but her  believes it more like 2-3 16 oz bottles/daily. She scheduled to for IV fluids today. Encourage to increase her po intake. Will monitor labs 2 x weekly (Mondays/Thursdays).     6. Urology referral - US shows hydronephrosis and ureteral obstruction. She is aware that a referral was placed. Plan pending.     Will update Dr Mcintyre.

## 2023-05-23 NOTE — PROGRESS NOTES
Nursing Note  Mona Wagner presents today to [facility] for:   Chief Complaint   Patient presents with     Infusion     IVF     Imm/Inj     Aranesp     During today's [facility] appointment, orders from Dr. Mcintyre were completed.  Frequency: IVF once, aranesp PRN every 14 days    Progress note:  Patient identification verified by name and date of birth.  Assessment completed.  Vitals recorded in Doc Flowsheets.  Patient was provided with education regarding medication/procedure and possible side effects.  Patient verbalized understanding.     present during visit today: Not Applicable.    Treatment Conditions:   -patient's hemoglobin of 8.4 on 5/22, meets parameters for injection    Premedications: were not ordered.    Drug Waste Record: No    Infusion length and rate:  infusion given over approximately 60 minutes  Aranesp given via subcutaneous injection in posterior upper right arm.    Labs: were not ordered for this appointment.    Vascular access: peripheral IV was placed by vascular access nurse.    Post Infusion Assessment:  Patient tolerated infusion without incident.  Patient tolerated injection without incident. Observed for 20 minutes post aranesp injection (due to first dose since pregnancy) and continued to tolerate well.    Discharge Plan:   Follow up plan of care with: ongoing infusions at [facility]., transplant coordinator., ordering provider as scheduled. and AVS to [facility]  Discharge instructions were reviewed with patient.  Patient/representative verbalized understanding of discharge instructions and all questions answered.  Patient discharged from [facility] in stable condition.    Macarena Ramsye RN       Administrations This Visit     0.9% sodium chloride BOLUS     Admin Date  05/23/2023 Action  $New Bag Dose  1,000 mL Route  Intravenous Administered By  Lisa,  Macarena, RN          darbepoetin matt-polysorbate (ARANESP) injection 40 mcg     Admin Date  05/23/2023 Action  $Given Dose  40 mcg Route  Subcutaneous Administered By  Macarena Gonzalez, RN                /75   Pulse 86   Resp 18   LMP 12/16/2022   SpO2 99%

## 2023-05-24 ENCOUNTER — DOCUMENTATION ONLY (OUTPATIENT)
Dept: PHARMACY | Facility: CLINIC | Age: 33
End: 2023-05-24
Payer: MEDICAID

## 2023-05-24 ENCOUNTER — TEAM CONFERENCE (OUTPATIENT)
Dept: TRANSPLANT | Facility: CLINIC | Age: 33
End: 2023-05-24
Payer: MEDICAID

## 2023-05-24 ENCOUNTER — TELEPHONE (OUTPATIENT)
Dept: MATERNAL FETAL MEDICINE | Facility: CLINIC | Age: 33
End: 2023-05-24

## 2023-05-24 DIAGNOSIS — N13.30 HYDRONEPHROSIS: ICD-10-CM

## 2023-05-24 DIAGNOSIS — O09.90 HIGH-RISK PREGNANCY: ICD-10-CM

## 2023-05-24 DIAGNOSIS — R79.89 ELEVATED SERUM CREATININE: ICD-10-CM

## 2023-05-24 DIAGNOSIS — Z94.0 KIDNEY REPLACED BY TRANSPLANT: Primary | ICD-10-CM

## 2023-05-24 NOTE — PROGRESS NOTES
Maternal fetal Medicine OB Follow up visit.     Mona Wagner  : 1990  MRN: [id number]    CC: OB Follow-up    Subjective:  Mona Wagner is a 32 year old  at 22w6d presenting for routine OB follow-up.    She has been feeling quite overwhelmed since Monday. She had an US of her kidney and saw hydronephrosis of her graft kidney. She also hs been having uptrending creatinine over the past several weeks. She has labs scheduled today and will continue to monitor over the next few weeks. If status does not improve, then she may have a nephrostomy tube place. She is feeling really anxious about this news and is worried. She talks to her partner about how she is feeling but she feels like he doesn't understand. She notes that this isn't normal for her, but feels like it is one thing after the other with her parathyroid and now this.    She has been checking her BP at home and they have been in the 110-120s. She denies signs or symptoms of preeclampsia.     She denies any obstetric complaints. She endorses FM. Denies VB, LOF, or cxns.    Objective:  /77 (BP Location: Right arm, Patient Position: Sitting, Cuff Size: Adult Regular)   Pulse 76   Resp 16   Wt 80.9 kg (178 lb 4.8 oz)   LMP 2022   SpO2 98%   BMI 34.82 kg/m       Gen: alert, oriented, NAD  Respiratory: breathing unlabored, no SOB  Abdominal: gravid, non-tender  Pelvic: deferred  Extremities: AV fistula in place on left forearm.    FHT: 140    Assessment/Plan:  32 year old  at 22w6d here for follow OB visit.    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 Mcintyre in transplant nephrology; monitoring labs closely. Given hydronephrosis on US and increased Cr,  consideration for nephrostomy tube.   - Resolution of cHTN s/p transplant.  - Continue with Tacrolimus (goal 4-6) and Azathioprine as prescribed by nephrology. Dose recently increased to 6mg AM and 6mg PM daily.   - Baseline Cr 0.8-1.1; last Cr 1.69; mild proteinuria UPC ratio 0.25.   - Close management of blood pressure with goal of <130/90. BP systolic 132, though patient was tearful and home BP have been normal. Will continue to closely monitor.   - Early detection and treatment of asymptomatic bacteriuria with urine culture at least every trimester. UA/UC last on 3/29 and WNL.  - Continue low dose ASA  - Discussed that we would support use of nephrostomy tube in pregnancy, if indicated. Will reach out to Dr. Mcintyre, with patient's permission, to let him know her concerns. Offered support with  mental health. She is not interested today, though if symptoms persist, would be open to meeting with her.      Hypothyroidism  Secondary Hyperparathyroidism with hypercalcemia s/p parathyroidectomy  - Resection of parathyroid glands on .  - Follows with endo. Continue current levothyroxine prescription 37.5mcg daily.  - TSH 1.77 on 5/15     Anemia of Chronic Disease  - On erythropoietin stimulating agents  - Last Hgb 8.4    Hx Provoked DVT in   - Assessment for inherited thrombophilias including Factor V Leiden and Prothrombin Gene Mutation: Negative.  - Additional hypercoagulability work up negative.  - Not on prophylactic anticoagulation     Hx of bacterial endocarditis:  - Echo on 3/20, EF 70%  - Will discuss whether she requires SBE prophylaxis intrapartum     Routine PNC:  - Prenatal labs:               Rh: +  antibody: neg               HepB/HIV/RPR/HepC: nonreactive               GC/CT: neg               Rubella: non-immune  Varicella: non-immune               GCT: passed early GCT. Will repeat 24-28 weeks               UC: no growth               Pap: NIL and HPV neg  - Immunizations: s/p Flu and  Covid. Recommend Tdap at 28 weeks  - Genetic screening: NIPT low-risk. Low-risk NT     FATOUMATA  - Continue CPAP     Surveillance  - Serial growth US q4 after anatomy scan  - Weekly BPPs at 32 weeks     Delivery Planning  - Timing of delivery will be indicated by secondary sequelae, but generally 37-39 weeks.  reserved for usual obstetrical indications.   - Labor analgesia: will discuss at later date  - Feeding: plans to formula feed  - Contraception: needs to be discussed    Discussed with Dr. Tellez.    RTC in 2 weeks for OBV and Follow-up Comp US.     Neelima Ray MD  Maternal-Fetal Medicine Fellow    Physician Attestation   I, Timothy Tellez MD, saw this patient and agree with the findings and plan of care as documented in the note.      Items personally reviewed/procedural attestation: History, ultrasound review, plan of care review and recommendations. The total time spent in all patient care activities on the day of this visit was 20 minutes.    Timothy Tellez MD

## 2023-05-24 NOTE — TELEPHONE ENCOUNTER
Post Kidney and Pancreas Transplant Team Conference  Date: 5/24/2023  Transplant Coordinator: Angeles Leroy     Attendees:  []  Dr. Hill [] Danielle Smith, RN [] Leonela Brewster LPN     []  Dr. Wilson [] Cinthia Cox, ERMELINDA [] Kiersten Chang LPN   []  Dr. Adams [] Angelina Garvin RN    []  Dr. Mcintyre [] Keeley Li RN [] Anthony Hendricks, PharmD   [] Dr. Cervantes [] Jennifer Trejo, ERMELINDA    [] Dr. Durbin [] Virgilio Ryan RN    [] Dr. Pinto [] Ophelia Oneal, ERMELINDA [] Melani Vazquez RN   [] Dr. Delgadillo [] Yesika Wong RN    []  Dr. Ruiz [] Delicia Bonner RN    [] Dr. Johnson [] Nancy Maldonado RN    [] Johana Linda, NP [] Nathalia Moreno RN        Verbal Plan Read Back:   Retrograde stent placement  Transplant US with elsa Britt in EPIC    Routed to RN Coordinator   Kiersten Chang LPN    Spoke w/ pt regarding above plan. Pt v/u  Message sent to Dr Britt  Ultrasound ordered

## 2023-05-24 NOTE — PROGRESS NOTES
Anemia Management Note  SUBJECTIVE/OBJECTIVE:  Referred by Dr. Gelacio Mcintyre on 2023  Primary Diagnosis: Anemia in Chronic Kidney Disease (N18.3, D63.1)   3a  Secondary Diagnosis:  Chronic Kidney Disease, Stage 3 (N18.3)  3a  Hgb goal range:  9-10  Kidney Tx: 8/3/2019  Epo/Darbo: Aranesp 40mcg every 14 days for Hgb <10.0. In Clinic.   Iron regimen:  Prenatal vit with Iron.   *Elevated Ferritin levels.   Labs : 2024  Recent CHARLINE use, transfusion, IV iron: Aranesp .  RX/TX plans : 2024    *Prefers [facility]      Hx of DVT (), High Risk Pregnancy (2nd trimester).     Contact: OK to speak with Stephan Wagner (father) and Savanah Wagner (sister) regarding Medical Information per consent to communicate dated 4/3/2020.        Latest Ref Rng & Units 5/3/2023     9:05 AM 2023     9:15 AM 2023     9:26 AM 5/15/2023    10:11 AM 2023    10:21 AM 2023    10:23 AM 2023     7:24 AM   Anemia   CHARLINE Dose        40mcg   Hemoglobin 11.7 - 15.7 g/dL 9.6   9.5   8.9   8.4   8.4       Ferritin 6 - 175 ng/mL      1,618        BP Readings from Last 3 Encounters:   23 118/75   05/15/23 113/59   05/10/23 121/80     Wt Readings from Last 2 Encounters:   05/15/23 175 lb (79.4 kg)   23 172 lb 13.5 oz (78.4 kg)           ASSESSMENT:  Hgb:Not at goal/Initiation of therapy  TSat: at goal >30% Ferritin: Elevated (>1000ng/mL)    PLAN:  Dose with aranesp and RTC for hgb then aranesp if needed in 2 week(s).  Mona will speak to her OB regarding elevated Ferritin levels and her prenatal vits.  Mona was not sure if they had Iron in them or not.  Message sent to [facility] to help Mona schedule next Aranesp injection. Due approx 23.     Orders needed to be renewed (for next follow-up date) in EPIC: None    Iron labs due:  End of 2023    Plan discussed with:  No call today.       NEXT FOLLOW-UP DATE:  23    Manjula Mckinnon RN   Anemia Services  [facility]  [place] [address] [place] [address]   [email or web address]   Office : [phone]  Fax: [phone]

## 2023-05-25 ENCOUNTER — TELEPHONE (OUTPATIENT)
Dept: TRANSPLANT | Facility: CLINIC | Age: 33
End: 2023-05-25

## 2023-05-25 ENCOUNTER — LAB (OUTPATIENT)
Dept: LAB | Facility: CLINIC | Age: 33
End: 2023-05-25
Attending: ADVANCED PRACTICE MIDWIFE
Payer: MEDICARE

## 2023-05-25 ENCOUNTER — OFFICE VISIT (OUTPATIENT)
Dept: MATERNAL FETAL MEDICINE | Facility: CLINIC | Age: 33
End: 2023-05-25
Attending: ADVANCED PRACTICE MIDWIFE
Payer: MEDICARE

## 2023-05-25 VITALS
SYSTOLIC BLOOD PRESSURE: 132 MMHG | WEIGHT: 178.3 LBS | HEART RATE: 76 BPM | BODY MASS INDEX: 34.82 KG/M2 | RESPIRATION RATE: 16 BRPM | DIASTOLIC BLOOD PRESSURE: 77 MMHG | OXYGEN SATURATION: 98 %

## 2023-05-25 DIAGNOSIS — Z94.0 CURRENT PREGNANCY IN SECOND TRIMESTER WITH HISTORY OF KIDNEY TRANSPLANTATION: Primary | ICD-10-CM

## 2023-05-25 DIAGNOSIS — Z48.298 AFTERCARE FOLLOWING ORGAN TRANSPLANT: ICD-10-CM

## 2023-05-25 DIAGNOSIS — E86.0 DEHYDRATION: ICD-10-CM

## 2023-05-25 DIAGNOSIS — O09.892 CURRENT PREGNANCY IN SECOND TRIMESTER WITH HISTORY OF KIDNEY TRANSPLANTATION: Primary | ICD-10-CM

## 2023-05-25 DIAGNOSIS — Z94.0 KIDNEY REPLACED BY TRANSPLANT: ICD-10-CM

## 2023-05-25 DIAGNOSIS — O09.92 SUPERVISION OF HIGH RISK PREGNANCY IN SECOND TRIMESTER: ICD-10-CM

## 2023-05-25 LAB
ANION GAP SERPL CALCULATED.3IONS-SCNC: 11 MMOL/L (ref 7–15)
BASOPHILS # BLD AUTO: 0 10E3/UL (ref 0–0.2)
BASOPHILS NFR BLD AUTO: 0 %
BUN SERPL-MCNC: 27.2 MG/DL (ref 6–20)
CALCIUM SERPL-MCNC: 9.9 MG/DL (ref 8.6–10)
CHLORIDE SERPL-SCNC: 107 MMOL/L (ref 98–107)
CREAT SERPL-MCNC: 1.5 MG/DL (ref 0.51–0.95)
DEPRECATED HCO3 PLAS-SCNC: 19 MMOL/L (ref 22–29)
EOSINOPHIL # BLD AUTO: 0.1 10E3/UL (ref 0–0.7)
EOSINOPHIL NFR BLD AUTO: 1 %
ERYTHROCYTE [DISTWIDTH] IN BLOOD BY AUTOMATED COUNT: 13.1 % (ref 10–15)
GFR SERPL CREATININE-BSD FRML MDRD: 48 ML/MIN/1.73M2
GLUCOSE SERPL-MCNC: 96 MG/DL (ref 70–99)
HCT VFR BLD AUTO: 26.6 % (ref 35–47)
HGB BLD-MCNC: 8.6 G/DL (ref 11.7–15.7)
IMM GRANULOCYTES # BLD: 0.1 10E3/UL
IMM GRANULOCYTES NFR BLD: 1 %
LYMPHOCYTES # BLD AUTO: 0.8 10E3/UL (ref 0.8–5.3)
LYMPHOCYTES NFR BLD AUTO: 10 %
MCH RBC QN AUTO: 30.7 PG (ref 26.5–33)
MCHC RBC AUTO-ENTMCNC: 32.3 G/DL (ref 31.5–36.5)
MCV RBC AUTO: 95 FL (ref 78–100)
MONOCYTES # BLD AUTO: 0.4 10E3/UL (ref 0–1.3)
MONOCYTES NFR BLD AUTO: 5 %
NEUTROPHILS # BLD AUTO: 7 10E3/UL (ref 1.6–8.3)
NEUTROPHILS NFR BLD AUTO: 83 %
NRBC # BLD AUTO: 0 10E3/UL
NRBC BLD AUTO-RTO: 0 /100
PLATELET # BLD AUTO: 194 10E3/UL (ref 150–450)
POTASSIUM SERPL-SCNC: 4.7 MMOL/L (ref 3.4–5.3)
RBC # BLD AUTO: 2.8 10E6/UL (ref 3.8–5.2)
SODIUM SERPL-SCNC: 137 MMOL/L (ref 136–145)
TACROLIMUS BLD-MCNC: 3.8 UG/L (ref 5–15)
TME LAST DOSE: ABNORMAL H
TME LAST DOSE: ABNORMAL H
WBC # BLD AUTO: 8.4 10E3/UL (ref 4–11)

## 2023-05-25 PROCEDURE — 85025 COMPLETE CBC W/AUTO DIFF WBC: CPT

## 2023-05-25 PROCEDURE — 80197 ASSAY OF TACROLIMUS: CPT

## 2023-05-25 PROCEDURE — 80048 BASIC METABOLIC PNL TOTAL CA: CPT

## 2023-05-25 PROCEDURE — 99213 OFFICE O/P EST LOW 20 MIN: CPT | Mod: GC | Performed by: OBSTETRICS & GYNECOLOGY

## 2023-05-25 PROCEDURE — 36415 COLL VENOUS BLD VENIPUNCTURE: CPT

## 2023-05-25 PROCEDURE — G0463 HOSPITAL OUTPT CLINIC VISIT: HCPCS | Performed by: OBSTETRICS & GYNECOLOGY

## 2023-05-25 RX ORDER — TACROLIMUS 5 MG/1
5 CAPSULE ORAL 2 TIMES DAILY
Qty: 60 CAPSULE | Refills: 11 | Status: SHIPPED | OUTPATIENT
Start: 2023-05-25 | End: 2023-07-11

## 2023-05-25 RX ORDER — MULTIVITAMIN
1 TABLET ORAL DAILY
Qty: 90 TABLET | Refills: 2 | Status: ON HOLD | OUTPATIENT
Start: 2023-05-25 | End: 2023-06-08

## 2023-05-25 RX ORDER — TACROLIMUS 1 MG/1
2 CAPSULE ORAL 2 TIMES DAILY
Qty: 360 CAPSULE | Refills: 3 | Status: SHIPPED | OUTPATIENT
Start: 2023-05-25 | End: 2023-07-11

## 2023-05-25 NOTE — PATIENT INSTRUCTIONS
Understanding Round Ligament Pain in Pregnancy   Round ligament pain is a common problem in pregnancy. Ligaments are strong tissues that connect bones, muscles, and organs. There are 2 round ligaments. There is 1 on each side of the uterus. The top part of each ligament attaches to the upper side of the uterus. The bottom of each ligament attaches down in the pubic area. These ligaments help keep the uterus in place as you move around.     What causes round ligament pain in pregnancy?   As your uterus grows during pregnancy, the round ligaments are stretched and work harder when you move around. They may stretch too quickly when you stand up or bend or laugh. Nearby nerves may be irritated, or the ligaments may have a painful spasm.   Symptoms of ligament pain in pregnancy  The symptoms are sharp pains that last a few seconds. The pain may happen most often on the right side of the belly. It may happen in the hip, the lower belly, or even deep down in your pubic area. The pain may happen when you:     Move suddenly    Stand up    Walk    Roll over in bed    Laugh    Cough    Sneeze  Diagnosing round ligament pain in pregnancy   Your healthcare provider will ask about your symptoms and give you a physical exam. He or she may give you tests to check for other problems that can cause pain, such as an ovarian cyst or enlarged vein (varicocele). He or she will also check for signs of  labor or other pregnancy problems.   Treatment for round ligament pain in pregnancy   To help prevent pain:    Move slowly when you stand up, roll over, turn, or bend.    Don t stand for long periods of time.    Don t lift heavy objects.    Do gentle daily stretches of your hip joints.  When to call your healthcare provider  Call your healthcare provider right away if you have any of these:     Fever of 100.4 F (38 C) or higher    Pains that last more than a few minutes    Pain that gets worse    Bleeding, nausea, vomiting, or  other new symptoms  [employer] last reviewed this educational content on 3/1/2020    7420-8233 [employer]. All rights reserved. This information is not intended as a substitute for professional medical care. Always follow your healthcare professional's instructions.          Relieving Back Pain During Pregnancy: Wall Stretch, Body Bend   Before trying these exercises, talk to your healthcare provider to make sure they are safe for you. Ask your healthcare provider how many times to do each exercise.   Wall stretch  This strengthens and loosens the muscles in your upper back:   1. Lean against a wall with a firm pillow or rolled towel under your shoulder blades. Your feet should be about 12 inches from the wall and shoulder-width apart. Point your chin down.  2. Breathe in. Push your shoulders, neck, and head against the wall. You will feel a stretch in your shoulders.  3. Hold for 5 counts. Then breathe out, and relax your shoulders and neck.   Body bend  This strengthens your back and buttocks muscles:   1. Stand with your legs shoulder-width apart. Put your hands on your upper thighs and bend your knees slightly.  2. Slowly bend forward at the hips. Push your hips back and keep your shoulders up. Make sure your back is straight. You ll feel a stretch in your upper thighs. You ll also feel your back muscles holding you in position.  3. Hold for 5 counts, then straighten.   [employer] last reviewed this educational content on 7/1/2021 2000-2022 [employer]. All rights reserved. This information is not intended as a substitute for professional medical care. Always follow your healthcare professional's instructions.          Pregnancy: Planning Your Exercise Routine  While you re pregnant, an exercise routine helps both your mind and your body feel good. It tones your muscles and makes them stronger. It also gives you and your baby more oxygen.   The right exercise for you    Overall  conditioning is best for you and your baby. Try walking, swimming, or riding a stationary bike. Always warm up, cool down, and drink enough fluids. Keep a snack close by in case your blood sugar gets low. Discuss exercise choices with your healthcare provider. Talk about the following:     If you already exercise, find out how to adapt your routine while you re pregnant. Keep the intensity of the exercise moderate. As your pregnancy progresses, your center of gravity will change. Be careful to keep your balance.    Ask if there are any local prenatal exercise classes, such as yoga or water aerobics. Find out which prenatal exercise videos are good choices.    If you were not exercising before your pregnancy, find out the best way to start. Now is not the time to begin a new workout on your own. Start slowly. Listen to your body.    Ask which forms of exercise you should avoid. These may include risky activities like hot yoga, horseback riding, scuba diving, skiing, skating, and contact sports.  Pelvic tilts  These help strengthen your stomach muscles and low back. You can do pelvic tilts instead of sit-ups.     Do this exercise on your hands and knees.    Relax the back of your neck. Pull your stomach in until your low back flattens.    Hold for 30 seconds. Release. Repeat 10 times. Do this twice a day.  Kegel exercises  Kegel exercises strengthen the pelvic muscles. Doing Kegels daily helps prepare these muscles for delivery. Kegels also help ease your recovery. You exercise these muscles by tightening, holding, then relaxing them. To do 1 type of Kegel exercise, contract as if you were stopping your urine stream (but do it when you re not urinating). Hold for 10 seconds, then repeat 10 times, a few times a day.   Tips to add activity  Here are some tips to follow:    Park the car farther from a store and walk.    If you can, do errands on foot instead of driving.    Walk across the office to talk to someone in  person instead of calling.    While waiting for appointments, go up and down stairs or around the block.  Tips to stay active  Here are some tips to follow:    Maintain your routine. But exercise less intensely if you feel tired.    Base your workout on how you feel, not your heart rate. Heart rates aren t a good way to measure effort during pregnancy.    Don't exercise on your back after week 16.  What are the warning signs that I should stop exercising?  Stop exercising and call your healthcare provider if you have any of these symptoms:    Vaginal bleeding     Dizziness or feeling faint     Increased shortness of breath     Chest pain     Headache     Muscle weakness     Calf (back of the leg) pain or swelling      Uterine contractions or  labor     Decreased fetal movement     Fluid leaking (or gushing) from your vagina  [employer] last reviewed this educational content on 2021-2022 [employer]. All rights reserved. This information is not intended as a substitute for professional medical care. Always follow your healthcare professional's instructions.

## 2023-05-25 NOTE — TELEPHONE ENCOUNTER
Gelacio Mcintyre MD Ylitalo, Kim Michelle, RN  Summary:     Great news that Scr improved after IV fluids but I think part of the increase is due to compression so I responded with a message to Dr. Britt to please work up for stent placement retrograde.     Obtain U/S w/ doppler.     Increase tac to 7/7.

## 2023-05-25 NOTE — TELEPHONE ENCOUNTER
ISSUE:   Tacrolimus IR level 3.8 on 5/25, goal 4-6, dose 6 mg BID.    PLAN:   Please call patient and confirm this was an accurate 12-hour trough. Verify Tacrolimus IR dose 6 mg BID. Confirm no new medications or illness. Confirm no missed doses. If accurate trough and accurate dose, increase Tacrolimus IR dose to 7 mg BID and repeat labs in 1 week    OUTCOME:   Spoke with patient, they confirm accurate trough level and current dose 6 mg BID. Patient confirmed dose change to 7 mg BID and to repeat labs on Monday as scheduled. Orders sent to preferred pharmacy for dose change and lab for repeat labs. Patient voiced understanding of plan.

## 2023-05-25 NOTE — NURSING NOTE
Mona seen in clinic today for OB visit at 22w6d gestation for pregnancy complicated by hx kidney transplant, hydronephrosis, FATOUMATA, hypothyroidism, hx bacterial endocarditis, hx parathyroid gland resection (see report/notes). VSS. Pt reports normal fetal movement, see flowsheet. Pt denies bldg/lof/change in discharge/contractions/headache/vision changes/chest pain/SOB/edema. Pt assessed for  labor, preeclampsia, infection, fetal wellbeing without concerns. Patient teary over discussions with nephrology as she feels overwhelmed by possibility of needing nephrostomy tube. Offered BHC, patient declines at this time. She also notes she is having a harder time sleeping than before with discomfort and frequent trips to bathroom. Pt notified to review new pt education in AVS, verbally reviewed highlights. Dr. Ray and Dr. Tellez met with pt and discussed POC. Plan for return in 2 weeks for RL2 and OBV. Pt discharged stable and ambulatory.

## 2023-05-26 DIAGNOSIS — Z94.0 KIDNEY TRANSPLANTED: ICD-10-CM

## 2023-05-26 RX ORDER — ASPIRIN 81 MG/1
81 TABLET, CHEWABLE ORAL DAILY
Qty: 90 TABLET | Refills: 3 | Status: ON HOLD | OUTPATIENT
Start: 2023-05-26 | End: 2023-08-15

## 2023-05-30 ENCOUNTER — LAB (OUTPATIENT)
Dept: LAB | Facility: HOSPITAL | Age: 33
End: 2023-05-30
Payer: MEDICARE

## 2023-05-30 ENCOUNTER — TELEPHONE (OUTPATIENT)
Dept: TRANSPLANT | Facility: CLINIC | Age: 33
End: 2023-05-30

## 2023-05-30 DIAGNOSIS — Z94.0 KIDNEY REPLACED BY TRANSPLANT: ICD-10-CM

## 2023-05-30 DIAGNOSIS — Z48.298 AFTERCARE FOLLOWING ORGAN TRANSPLANT: ICD-10-CM

## 2023-05-30 DIAGNOSIS — E86.0 DEHYDRATION: ICD-10-CM

## 2023-05-30 DIAGNOSIS — E58 CALCIUM DEFICIENCY: ICD-10-CM

## 2023-05-30 DIAGNOSIS — R79.0 LOW MAGNESIUM LEVEL: ICD-10-CM

## 2023-05-30 DIAGNOSIS — O09.92 HIGH-RISK PREGNANCY IN SECOND TRIMESTER: Primary | ICD-10-CM

## 2023-05-30 DIAGNOSIS — Z94.0 KIDNEY TRANSPLANTED: ICD-10-CM

## 2023-05-30 DIAGNOSIS — Z34.90 PREGNANT: ICD-10-CM

## 2023-05-30 LAB
ALBUMIN MFR UR ELPH: 4.2 MG/DL (ref 1–14)
ANION GAP SERPL CALCULATED.3IONS-SCNC: 13 MMOL/L (ref 7–15)
BUN SERPL-MCNC: 28.6 MG/DL (ref 6–20)
CALCIUM SERPL-MCNC: 9.2 MG/DL (ref 8.6–10)
CALCIUM SERPL-MCNC: 9.2 MG/DL (ref 8.6–10)
CALCIUM, IONIZED MEASURED: 1.21 MMOL/L (ref 1.11–1.3)
CHLORIDE SERPL-SCNC: 107 MMOL/L (ref 98–107)
CREAT SERPL-MCNC: 1.66 MG/DL (ref 0.51–0.95)
CREAT UR-MCNC: 52.4 MG/DL
DEPRECATED HCO3 PLAS-SCNC: 18 MMOL/L (ref 22–29)
ERYTHROCYTE [DISTWIDTH] IN BLOOD BY AUTOMATED COUNT: 13.4 % (ref 10–15)
GFR SERPL CREATININE-BSD FRML MDRD: 42 ML/MIN/1.73M2
GLUCOSE SERPL-MCNC: 89 MG/DL (ref 70–99)
HCT VFR BLD AUTO: 25.7 % (ref 35–47)
HGB BLD-MCNC: 8.3 G/DL (ref 11.7–15.7)
ION CA PH 7.4: 1.17 MMOL/L (ref 1.11–1.3)
MAGNESIUM SERPL-MCNC: 1.7 MG/DL (ref 1.7–2.3)
MCH RBC QN AUTO: 30.5 PG (ref 26.5–33)
MCHC RBC AUTO-ENTMCNC: 32.3 G/DL (ref 31.5–36.5)
MCV RBC AUTO: 95 FL (ref 78–100)
PH: 7.34 (ref 7.35–7.45)
PLATELET # BLD AUTO: 199 10E3/UL (ref 150–450)
POTASSIUM SERPL-SCNC: 4.4 MMOL/L (ref 3.4–5.3)
PROT/CREAT 24H UR: 0.08 MG/MG CR (ref 0–0.2)
RBC # BLD AUTO: 2.72 10E6/UL (ref 3.8–5.2)
SODIUM SERPL-SCNC: 138 MMOL/L (ref 136–145)
WBC # BLD AUTO: 9 10E3/UL (ref 4–11)

## 2023-05-30 PROCEDURE — 80048 BASIC METABOLIC PNL TOTAL CA: CPT

## 2023-05-30 PROCEDURE — 83735 ASSAY OF MAGNESIUM: CPT

## 2023-05-30 PROCEDURE — 80197 ASSAY OF TACROLIMUS: CPT

## 2023-05-30 PROCEDURE — 36415 COLL VENOUS BLD VENIPUNCTURE: CPT

## 2023-05-30 PROCEDURE — 85027 COMPLETE CBC AUTOMATED: CPT

## 2023-05-30 PROCEDURE — 82330 ASSAY OF CALCIUM: CPT

## 2023-05-30 PROCEDURE — 84156 ASSAY OF PROTEIN URINE: CPT

## 2023-05-30 PROCEDURE — 82310 ASSAY OF CALCIUM: CPT

## 2023-05-30 RX ORDER — ALBUTEROL SULFATE 90 UG/1
1-2 AEROSOL, METERED RESPIRATORY (INHALATION)
Status: CANCELLED
Start: 2023-05-31

## 2023-05-30 RX ORDER — METHYLPREDNISOLONE SODIUM SUCCINATE 125 MG/2ML
125 INJECTION, POWDER, LYOPHILIZED, FOR SOLUTION INTRAMUSCULAR; INTRAVENOUS
Status: CANCELLED
Start: 2023-05-31

## 2023-05-30 RX ORDER — DIPHENHYDRAMINE HYDROCHLORIDE 50 MG/ML
50 INJECTION INTRAMUSCULAR; INTRAVENOUS
Status: CANCELLED
Start: 2023-05-31

## 2023-05-30 RX ORDER — EPINEPHRINE 1 MG/ML
0.3 INJECTION, SOLUTION, CONCENTRATE INTRAVENOUS EVERY 5 MIN PRN
Status: CANCELLED | OUTPATIENT
Start: 2023-05-31

## 2023-05-30 RX ORDER — SODIUM BICARBONATE 650 MG/1
650 TABLET ORAL 2 TIMES DAILY
Qty: 180 TABLET | Refills: 3 | Status: SHIPPED | OUTPATIENT
Start: 2023-05-30 | End: 2023-06-22

## 2023-05-30 RX ORDER — MEPERIDINE HYDROCHLORIDE 25 MG/ML
25 INJECTION INTRAMUSCULAR; INTRAVENOUS; SUBCUTANEOUS EVERY 30 MIN PRN
Status: CANCELLED | OUTPATIENT
Start: 2023-05-31

## 2023-05-30 RX ORDER — HEPARIN SODIUM (PORCINE) LOCK FLUSH IV SOLN 100 UNIT/ML 100 UNIT/ML
5 SOLUTION INTRAVENOUS
Status: CANCELLED | OUTPATIENT
Start: 2023-05-31

## 2023-05-30 RX ORDER — HEPARIN SODIUM,PORCINE 10 UNIT/ML
5 VIAL (ML) INTRAVENOUS
Status: CANCELLED | OUTPATIENT
Start: 2023-05-31

## 2023-05-30 RX ORDER — ALBUTEROL SULFATE 0.83 MG/ML
2.5 SOLUTION RESPIRATORY (INHALATION)
Status: CANCELLED | OUTPATIENT
Start: 2023-05-31

## 2023-05-30 NOTE — TELEPHONE ENCOUNTER
Per Dr Mcintyre, Creatinine up again. I haven't heard anything from urology. Please start bicarb 650mg bid. Recommend trying to get another 1L of LR infusion.     Contacted Mona - she was instructed to start bicarb 650 mg twice daily. Script sent to local pharmacy. Therapy plan order placed. Mona is aware the someone for the infusion center will be calling her to schedule IV fluids. . Per Mona request, she has an ultrasound tomorrow afternoon and would like to do infusion before or after that appt. Spoke to Richie the [facility] , he was made aware of Mona's request.     Mona has a standing order for labs every Monday and Thursday

## 2023-05-31 ENCOUNTER — ANCILLARY PROCEDURE (OUTPATIENT)
Dept: ULTRASOUND IMAGING | Facility: CLINIC | Age: 33
End: 2023-05-31
Attending: INTERNAL MEDICINE
Payer: MEDICARE

## 2023-05-31 DIAGNOSIS — R79.89 ELEVATED SERUM CREATININE: ICD-10-CM

## 2023-05-31 DIAGNOSIS — Z94.0 KIDNEY REPLACED BY TRANSPLANT: ICD-10-CM

## 2023-05-31 DIAGNOSIS — N13.30 HYDRONEPHROSIS: ICD-10-CM

## 2023-05-31 DIAGNOSIS — O09.90 HIGH-RISK PREGNANCY: ICD-10-CM

## 2023-05-31 LAB
TACROLIMUS BLD-MCNC: 4.6 UG/L (ref 5–15)
TME LAST DOSE: ABNORMAL H
TME LAST DOSE: ABNORMAL H

## 2023-05-31 PROCEDURE — 76776 US EXAM K TRANSPL W/DOPPLER: CPT | Mod: GC | Performed by: RADIOLOGY

## 2023-06-01 ENCOUNTER — INFUSION THERAPY VISIT (OUTPATIENT)
Dept: INFUSION THERAPY | Facility: HOSPITAL | Age: 33
End: 2023-06-01
Attending: INTERNAL MEDICINE
Payer: MEDICARE

## 2023-06-01 DIAGNOSIS — E86.0 DEHYDRATION: ICD-10-CM

## 2023-06-01 DIAGNOSIS — Z94.0 KIDNEY REPLACED BY TRANSPLANT: Primary | ICD-10-CM

## 2023-06-01 DIAGNOSIS — Z48.298 AFTERCARE FOLLOWING ORGAN TRANSPLANT: ICD-10-CM

## 2023-06-01 PROCEDURE — 96360 HYDRATION IV INFUSION INIT: CPT

## 2023-06-01 PROCEDURE — 96361 HYDRATE IV INFUSION ADD-ON: CPT

## 2023-06-01 PROCEDURE — 258N000003 HC RX IP 258 OP 636: Performed by: INTERNAL MEDICINE

## 2023-06-01 PROCEDURE — 999N000248 HC STATISTIC IV INSERT WITH US BY RN

## 2023-06-01 RX ORDER — ALBUTEROL SULFATE 0.83 MG/ML
2.5 SOLUTION RESPIRATORY (INHALATION)
Status: DISCONTINUED | OUTPATIENT
Start: 2023-06-01 | End: 2023-06-01 | Stop reason: HOSPADM

## 2023-06-01 RX ORDER — ALBUTEROL SULFATE 0.83 MG/ML
2.5 SOLUTION RESPIRATORY (INHALATION)
Status: CANCELLED | OUTPATIENT
Start: 2023-06-01

## 2023-06-01 RX ORDER — MEPERIDINE HYDROCHLORIDE 25 MG/ML
25 INJECTION INTRAMUSCULAR; INTRAVENOUS; SUBCUTANEOUS EVERY 30 MIN PRN
Status: CANCELLED | OUTPATIENT
Start: 2023-06-01

## 2023-06-01 RX ORDER — ALBUTEROL SULFATE 90 UG/1
1-2 AEROSOL, METERED RESPIRATORY (INHALATION)
Status: DISCONTINUED | OUTPATIENT
Start: 2023-06-01 | End: 2023-06-01 | Stop reason: HOSPADM

## 2023-06-01 RX ORDER — EPINEPHRINE 1 MG/ML
0.3 INJECTION, SOLUTION INTRAMUSCULAR; SUBCUTANEOUS EVERY 5 MIN PRN
Status: CANCELLED | OUTPATIENT
Start: 2023-06-01

## 2023-06-01 RX ORDER — METHYLPREDNISOLONE SODIUM SUCCINATE 125 MG/2ML
125 INJECTION, POWDER, LYOPHILIZED, FOR SOLUTION INTRAMUSCULAR; INTRAVENOUS
Status: CANCELLED
Start: 2023-06-01

## 2023-06-01 RX ORDER — METHYLPREDNISOLONE SODIUM SUCCINATE 125 MG/2ML
125 INJECTION, POWDER, LYOPHILIZED, FOR SOLUTION INTRAMUSCULAR; INTRAVENOUS
Status: DISCONTINUED | OUTPATIENT
Start: 2023-06-01 | End: 2023-06-01 | Stop reason: HOSPADM

## 2023-06-01 RX ORDER — HEPARIN SODIUM,PORCINE 10 UNIT/ML
5 VIAL (ML) INTRAVENOUS
Status: CANCELLED | OUTPATIENT
Start: 2023-06-01

## 2023-06-01 RX ORDER — EPINEPHRINE 1 MG/ML
0.3 INJECTION, SOLUTION INTRAMUSCULAR; SUBCUTANEOUS EVERY 5 MIN PRN
Status: DISCONTINUED | OUTPATIENT
Start: 2023-06-01 | End: 2023-06-01 | Stop reason: HOSPADM

## 2023-06-01 RX ORDER — ALBUTEROL SULFATE 90 UG/1
1-2 AEROSOL, METERED RESPIRATORY (INHALATION)
Status: CANCELLED
Start: 2023-06-01

## 2023-06-01 RX ORDER — MEPERIDINE HYDROCHLORIDE 25 MG/ML
25 INJECTION INTRAMUSCULAR; INTRAVENOUS; SUBCUTANEOUS EVERY 30 MIN PRN
Status: DISCONTINUED | OUTPATIENT
Start: 2023-06-01 | End: 2023-06-01 | Stop reason: HOSPADM

## 2023-06-01 RX ORDER — HEPARIN SODIUM (PORCINE) LOCK FLUSH IV SOLN 100 UNIT/ML 100 UNIT/ML
5 SOLUTION INTRAVENOUS
Status: CANCELLED | OUTPATIENT
Start: 2023-06-01

## 2023-06-01 RX ORDER — DIPHENHYDRAMINE HYDROCHLORIDE 50 MG/ML
50 INJECTION INTRAMUSCULAR; INTRAVENOUS
Status: DISCONTINUED | OUTPATIENT
Start: 2023-06-01 | End: 2023-06-01 | Stop reason: HOSPADM

## 2023-06-01 RX ORDER — DIPHENHYDRAMINE HYDROCHLORIDE 50 MG/ML
50 INJECTION INTRAMUSCULAR; INTRAVENOUS
Status: CANCELLED
Start: 2023-06-01

## 2023-06-01 RX ADMIN — SODIUM CHLORIDE, POTASSIUM CHLORIDE, SODIUM LACTATE AND CALCIUM CHLORIDE 1000 ML: 600; 310; 30; 20 INJECTION, SOLUTION INTRAVENOUS at 09:02

## 2023-06-01 NOTE — PROGRESS NOTES
Infusion Nursing Note:  Mona Wagner presents today for IV hydration.    Patient seen by provider today: No   present during visit today: Not Applicable.    Note: Mona arrives A&Ox4 ambulatory and stable, confirms she is here for IV hydration only. Pt states she is feeling well and reports no concerns today      Intravenous Access:  Pt requests PICC to start her IV with U/S.    Treatment Conditions:  dehydration.      Post Infusion Assessment:  Patient tolerated infusion without incident.  Access discontinued per protocol.       Discharge Plan:   Discharge instructions reviewed with: Patient.  Copy of AVS reviewed with patient and/or family.  Patient will return 6/6- labs in O/P labs, then INF for Aranesp if indicated for next appointment.  Patient discharged in stable condition accompanied by: self.      Manjula Adams RN

## 2023-06-02 ENCOUNTER — LAB (OUTPATIENT)
Dept: LAB | Facility: HOSPITAL | Age: 33
End: 2023-06-02
Payer: MEDICARE

## 2023-06-02 DIAGNOSIS — Z34.90 PREGNANT: ICD-10-CM

## 2023-06-02 DIAGNOSIS — Z48.298 AFTERCARE FOLLOWING ORGAN TRANSPLANT: ICD-10-CM

## 2023-06-02 DIAGNOSIS — Z94.0 KIDNEY REPLACED BY TRANSPLANT: ICD-10-CM

## 2023-06-02 LAB
ANION GAP SERPL CALCULATED.3IONS-SCNC: 13 MMOL/L (ref 7–15)
BUN SERPL-MCNC: 29.9 MG/DL (ref 6–20)
CALCIUM SERPL-MCNC: 9.4 MG/DL (ref 8.6–10)
CHLORIDE SERPL-SCNC: 106 MMOL/L (ref 98–107)
CREAT SERPL-MCNC: 1.69 MG/DL (ref 0.51–0.95)
DEPRECATED HCO3 PLAS-SCNC: 19 MMOL/L (ref 22–29)
ERYTHROCYTE [DISTWIDTH] IN BLOOD BY AUTOMATED COUNT: 13.6 % (ref 10–15)
GFR SERPL CREATININE-BSD FRML MDRD: 41 ML/MIN/1.73M2
GLUCOSE SERPL-MCNC: 96 MG/DL (ref 70–99)
HCT VFR BLD AUTO: 25 % (ref 35–47)
HGB BLD-MCNC: 8.2 G/DL (ref 11.7–15.7)
MCH RBC QN AUTO: 30.9 PG (ref 26.5–33)
MCHC RBC AUTO-ENTMCNC: 32.8 G/DL (ref 31.5–36.5)
MCV RBC AUTO: 94 FL (ref 78–100)
PLATELET # BLD AUTO: 192 10E3/UL (ref 150–450)
POTASSIUM SERPL-SCNC: 4.4 MMOL/L (ref 3.4–5.3)
RBC # BLD AUTO: 2.65 10E6/UL (ref 3.8–5.2)
SODIUM SERPL-SCNC: 138 MMOL/L (ref 136–145)
TACROLIMUS BLD-MCNC: 4.5 UG/L (ref 5–15)
TME LAST DOSE: ABNORMAL H
TME LAST DOSE: ABNORMAL H
WBC # BLD AUTO: 8.4 10E3/UL (ref 4–11)

## 2023-06-02 PROCEDURE — 80048 BASIC METABOLIC PNL TOTAL CA: CPT

## 2023-06-02 PROCEDURE — 85027 COMPLETE CBC AUTOMATED: CPT

## 2023-06-02 PROCEDURE — 80197 ASSAY OF TACROLIMUS: CPT

## 2023-06-02 PROCEDURE — 36415 COLL VENOUS BLD VENIPUNCTURE: CPT

## 2023-06-05 ENCOUNTER — MYC MEDICAL ADVICE (OUTPATIENT)
Dept: UROLOGY | Facility: CLINIC | Age: 33
End: 2023-06-05
Payer: MEDICARE

## 2023-06-05 DIAGNOSIS — O09.92 HIGH-RISK PREGNANCY IN SECOND TRIMESTER: ICD-10-CM

## 2023-06-05 DIAGNOSIS — N13.30 HYDRONEPHROSIS: ICD-10-CM

## 2023-06-05 DIAGNOSIS — Z94.0 KIDNEY REPLACED BY TRANSPLANT: Primary | ICD-10-CM

## 2023-06-05 DIAGNOSIS — Z94.0 KIDNEY REPLACED BY TRANSPLANT: ICD-10-CM

## 2023-06-05 NOTE — CONSULTS
Outpatient IR Referral    Patient is a 31 y/o female with a PMH of seizure disorder, DVT, CKD, s/p renal transplant, anxiety, hydronephrosis with pregnancy. Patient is currently 25 weeks pregnant. IR has been asked to place a PNT. Cr 1.69    Contrast allergy     US 5/31/23 IMPRESSION:   1.  Right renal transplant demonstrates moderate  hydroureteronephrosis, unchanged after voiding. This is similar to CT  6/22/2022 and worse compared to ultrasound 7/1/2021.  2.  Normal doppler evaluation of the renal transplant.    Case and imaging US 5/31/23 was reviewed with Dr. Theodore from IR and RL transplant PNT is approved. To be scheduled on [facility] with [facility].  Will ask [facility] for sedation recommendations. [facility] [facility] schedulers to coordinate.     Primary team Dr. Miguelina Stewart made aware of IR recommendations via epic messaging.    Rosalina MCGRATH  Interventional Radiology   IR on-call pager: [phone]

## 2023-06-06 ENCOUNTER — LAB (OUTPATIENT)
Dept: LAB | Facility: HOSPITAL | Age: 33
End: 2023-06-06
Payer: MEDICARE

## 2023-06-06 ENCOUNTER — MYC MEDICAL ADVICE (OUTPATIENT)
Dept: NEPHROLOGY | Facility: CLINIC | Age: 33
End: 2023-06-06

## 2023-06-06 ENCOUNTER — INFUSION THERAPY VISIT (OUTPATIENT)
Dept: INFUSION THERAPY | Facility: HOSPITAL | Age: 33
End: 2023-06-06
Payer: MEDICARE

## 2023-06-06 ENCOUNTER — PATIENT OUTREACH (OUTPATIENT)
Dept: CARE COORDINATION | Facility: CLINIC | Age: 33
End: 2023-06-06

## 2023-06-06 VITALS
HEART RATE: 69 BPM | DIASTOLIC BLOOD PRESSURE: 78 MMHG | RESPIRATION RATE: 18 BRPM | TEMPERATURE: 98 F | SYSTOLIC BLOOD PRESSURE: 131 MMHG | OXYGEN SATURATION: 100 %

## 2023-06-06 DIAGNOSIS — E03.9 ACQUIRED HYPOTHYROIDISM: ICD-10-CM

## 2023-06-06 DIAGNOSIS — Z94.0 KIDNEY REPLACED BY TRANSPLANT: ICD-10-CM

## 2023-06-06 DIAGNOSIS — Z34.90 PREGNANT: ICD-10-CM

## 2023-06-06 DIAGNOSIS — Z48.298 AFTERCARE FOLLOWING ORGAN TRANSPLANT: ICD-10-CM

## 2023-06-06 DIAGNOSIS — D63.1 ANEMIA OF CHRONIC RENAL FAILURE, STAGE 3A (H): ICD-10-CM

## 2023-06-06 DIAGNOSIS — N18.31 STAGE 3A CHRONIC KIDNEY DISEASE (H): Primary | ICD-10-CM

## 2023-06-06 DIAGNOSIS — E58 CALCIUM DEFICIENCY: ICD-10-CM

## 2023-06-06 DIAGNOSIS — O09.92 HIGH-RISK PREGNANCY IN SECOND TRIMESTER: ICD-10-CM

## 2023-06-06 DIAGNOSIS — E87.8 LOW BICARBONATE: Primary | ICD-10-CM

## 2023-06-06 DIAGNOSIS — N18.31 ANEMIA OF CHRONIC RENAL FAILURE, STAGE 3A (H): ICD-10-CM

## 2023-06-06 LAB
ANION GAP SERPL CALCULATED.3IONS-SCNC: 11 MMOL/L (ref 7–15)
BUN SERPL-MCNC: 28.2 MG/DL (ref 6–20)
CALCIUM SERPL-MCNC: 9.3 MG/DL (ref 8.6–10)
CALCIUM, IONIZED MEASURED: 1.2 MMOL/L (ref 1.11–1.3)
CHLORIDE SERPL-SCNC: 108 MMOL/L (ref 98–107)
CREAT SERPL-MCNC: 1.68 MG/DL (ref 0.51–0.95)
DEPRECATED HCO3 PLAS-SCNC: 19 MMOL/L (ref 22–29)
ERYTHROCYTE [DISTWIDTH] IN BLOOD BY AUTOMATED COUNT: 13.5 % (ref 10–15)
GFR SERPL CREATININE-BSD FRML MDRD: 42 ML/MIN/1.73M2
GLUCOSE SERPL-MCNC: 92 MG/DL (ref 70–99)
HCT VFR BLD AUTO: 25.7 % (ref 35–47)
HGB BLD-MCNC: 8.2 G/DL (ref 11.7–15.7)
ION CA PH 7.4: 1.17 MMOL/L (ref 1.11–1.3)
MCH RBC QN AUTO: 30.1 PG (ref 26.5–33)
MCHC RBC AUTO-ENTMCNC: 31.9 G/DL (ref 31.5–36.5)
MCV RBC AUTO: 95 FL (ref 78–100)
PH: 7.36 (ref 7.35–7.45)
PLATELET # BLD AUTO: 187 10E3/UL (ref 150–450)
POTASSIUM SERPL-SCNC: 4.5 MMOL/L (ref 3.4–5.3)
RBC # BLD AUTO: 2.72 10E6/UL (ref 3.8–5.2)
SODIUM SERPL-SCNC: 138 MMOL/L (ref 136–145)
T3FREE SERPL-MCNC: 2.2 PG/ML (ref 2–4.4)
T4 FREE SERPL-MCNC: 1 NG/DL (ref 0.9–1.7)
TACROLIMUS BLD-MCNC: 4.5 UG/L (ref 5–15)
TME LAST DOSE: ABNORMAL H
TME LAST DOSE: ABNORMAL H
TSH SERPL DL<=0.005 MIU/L-ACNC: 2.28 UIU/ML (ref 0.3–4.2)
WBC # BLD AUTO: 8.1 10E3/UL (ref 4–11)

## 2023-06-06 PROCEDURE — 84443 ASSAY THYROID STIM HORMONE: CPT

## 2023-06-06 PROCEDURE — 82310 ASSAY OF CALCIUM: CPT

## 2023-06-06 PROCEDURE — 36415 COLL VENOUS BLD VENIPUNCTURE: CPT

## 2023-06-06 PROCEDURE — 250N000011 HC RX IP 250 OP 636: Performed by: INTERNAL MEDICINE

## 2023-06-06 PROCEDURE — 84439 ASSAY OF FREE THYROXINE: CPT

## 2023-06-06 PROCEDURE — 96372 THER/PROPH/DIAG INJ SC/IM: CPT | Performed by: INTERNAL MEDICINE

## 2023-06-06 PROCEDURE — 84481 FREE ASSAY (FT-3): CPT

## 2023-06-06 PROCEDURE — 85027 COMPLETE CBC AUTOMATED: CPT

## 2023-06-06 PROCEDURE — 82330 ASSAY OF CALCIUM: CPT

## 2023-06-06 PROCEDURE — 80197 ASSAY OF TACROLIMUS: CPT

## 2023-06-06 RX ORDER — EPINEPHRINE 1 MG/ML
0.3 INJECTION, SOLUTION INTRAMUSCULAR; SUBCUTANEOUS EVERY 5 MIN PRN
Status: CANCELLED | OUTPATIENT
Start: 2023-06-06

## 2023-06-06 RX ORDER — DIPHENHYDRAMINE HYDROCHLORIDE 50 MG/ML
50 INJECTION INTRAMUSCULAR; INTRAVENOUS
Status: CANCELLED
Start: 2023-06-06

## 2023-06-06 RX ORDER — EPINEPHRINE 1 MG/ML
0.3 INJECTION, SOLUTION INTRAMUSCULAR; SUBCUTANEOUS EVERY 5 MIN PRN
Status: DISCONTINUED | OUTPATIENT
Start: 2023-06-06 | End: 2023-06-06 | Stop reason: HOSPADM

## 2023-06-06 RX ORDER — MEPERIDINE HYDROCHLORIDE 25 MG/ML
25 INJECTION INTRAMUSCULAR; INTRAVENOUS; SUBCUTANEOUS EVERY 30 MIN PRN
Status: DISCONTINUED | OUTPATIENT
Start: 2023-06-06 | End: 2023-06-06 | Stop reason: HOSPADM

## 2023-06-06 RX ORDER — DIPHENHYDRAMINE HYDROCHLORIDE 50 MG/ML
50 INJECTION INTRAMUSCULAR; INTRAVENOUS
Status: DISCONTINUED | OUTPATIENT
Start: 2023-06-06 | End: 2023-06-06 | Stop reason: HOSPADM

## 2023-06-06 RX ORDER — ALBUTEROL SULFATE 0.83 MG/ML
2.5 SOLUTION RESPIRATORY (INHALATION)
Status: DISCONTINUED | OUTPATIENT
Start: 2023-06-06 | End: 2023-06-06 | Stop reason: HOSPADM

## 2023-06-06 RX ORDER — METHYLPREDNISOLONE SODIUM SUCCINATE 125 MG/2ML
125 INJECTION, POWDER, LYOPHILIZED, FOR SOLUTION INTRAMUSCULAR; INTRAVENOUS
Status: DISCONTINUED | OUTPATIENT
Start: 2023-06-06 | End: 2023-06-06 | Stop reason: HOSPADM

## 2023-06-06 RX ORDER — ALBUTEROL SULFATE 90 UG/1
1-2 AEROSOL, METERED RESPIRATORY (INHALATION)
Status: CANCELLED
Start: 2023-06-06

## 2023-06-06 RX ORDER — ALBUTEROL SULFATE 0.83 MG/ML
2.5 SOLUTION RESPIRATORY (INHALATION)
Status: CANCELLED | OUTPATIENT
Start: 2023-06-06

## 2023-06-06 RX ORDER — METHYLPREDNISOLONE SODIUM SUCCINATE 125 MG/2ML
125 INJECTION, POWDER, LYOPHILIZED, FOR SOLUTION INTRAMUSCULAR; INTRAVENOUS
Status: CANCELLED
Start: 2023-06-06

## 2023-06-06 RX ORDER — MEPERIDINE HYDROCHLORIDE 25 MG/ML
25 INJECTION INTRAMUSCULAR; INTRAVENOUS; SUBCUTANEOUS EVERY 30 MIN PRN
Status: CANCELLED | OUTPATIENT
Start: 2023-06-06

## 2023-06-06 RX ORDER — ALBUTEROL SULFATE 90 UG/1
1-2 AEROSOL, METERED RESPIRATORY (INHALATION)
Status: DISCONTINUED | OUTPATIENT
Start: 2023-06-06 | End: 2023-06-06 | Stop reason: HOSPADM

## 2023-06-06 RX ADMIN — DARBEPOETIN ALFA 40 MCG: 40 INJECTION, SOLUTION INTRAVENOUS; SUBCUTANEOUS at 11:11

## 2023-06-06 NOTE — PROGRESS NOTES
Infusion Nursing Note:  Mona Wagner presents today for Aranesp injection.    Patient seen by provider today: No   present during visit today: Not Applicable.    Note: Mona arrived ambulatory after having her labs drawn. Hgb today is 8.2.  Aranesp injected subcutaneously to right arm.  Mona discharged 15 minutes after the injection. She is scheduled to return on 6/20/23.      Intravenous Access:  No Intravenous access/labs at this visit.    Treatment Conditions:  Results reviewed, labs MET treatment parameters, ok to proceed with treatment.      Post Infusion Assessment:  Patient tolerated injection without incident.  Site patent and intact, free from redness, edema or discomfort.       Discharge Plan:   Patient discharged in stable condition accompanied by: self.  Departure Mode: Ambulatory.      Sharon Soni RN

## 2023-06-06 NOTE — PROGRESS NOTES
Anemia Management Note  SUBJECTIVE/OBJECTIVE:  Referred by Dr. Gelacio Mcintyre on 2023  Primary Diagnosis: Anemia in Chronic Kidney Disease (N18.3, D63.1)   3a  Secondary Diagnosis:  Chronic Kidney Disease, Stage 3 (N18.3)  3a  Hgb goal range:  9-10  Kidney Tx: 8/3/2019  Epo/Darbo: Aranesp 40mcg every 14 days for Hgb <10.0. In Clinic.   Iron regimen:  Prenatal vit with Iron.   *Elevated Ferritin levels.   Labs : 2024  Recent CHARLINE use, transfusion, IV iron: Aranesp .  RX/TX plans : 2024     *Prefers [facility]      Hx of DVT (), High Risk Pregnancy (2nd trimester).     Contact: OK to speak with Stephan Wagner (father) and Savanah Wagner (sister) regarding Medical Information per consent to communicate dated 4/3/2020.        Latest Ref Rng & Units 2023    10:23 AM 2023     7:24 AM 2023     9:19 AM 2023    10:25 AM 2023    10:20 AM 2023    10:29 AM 2023    11:00 AM   Anemia   CHARLINE Dose   40mcg     40mcg   Hemoglobin 11.7 - 15.7 g/dL   8.6   8.3   8.2   8.2      Ferritin 6 - 175 ng/mL 1,618             BP Readings from Last 3 Encounters:   23 131/78   23 132/77   23 118/75     Wt Readings from Last 2 Encounters:   23 80.9 kg (178 lb 4.8 oz)   05/15/23 79.4 kg (175 lb)           ASSESSMENT:  Hgb:Not at goal/Known  TSat: at goal >30% Ferritin: Elevated (>1000ng/mL)    PLAN:  Dose with aranesp and RTC for hgb then aranesp if needed in 2 week(s)    Orders needed to be renewed (for next follow-up date) in EPIC: None    Iron labs due:  End of 2023    Plan discussed with:  No call, chart review       NEXT FOLLOW-UP DATE:  23    Manjula Mckinnon RN   Anemia Services  [address] [address]   [email or web address]   Office : [phone]  Fax: [phone]

## 2023-06-08 ENCOUNTER — TELEPHONE (OUTPATIENT)
Dept: ENDOCRINOLOGY | Facility: CLINIC | Age: 33
End: 2023-06-08
Payer: MEDICARE

## 2023-06-08 ENCOUNTER — HOSPITAL ENCOUNTER (INPATIENT)
Facility: CLINIC | Age: 33
LOS: 2 days | Discharge: HOME | DRG: 832 | End: 2023-06-10
Attending: OBSTETRICS & GYNECOLOGY | Admitting: OBSTETRICS & GYNECOLOGY
Payer: MEDICARE

## 2023-06-08 ENCOUNTER — HOSPITAL ENCOUNTER (OUTPATIENT)
Dept: ULTRASOUND IMAGING | Facility: CLINIC | Age: 33
Discharge: HOME | DRG: 832 | End: 2023-06-08
Attending: ADVANCED PRACTICE MIDWIFE
Payer: MEDICARE

## 2023-06-08 ENCOUNTER — OFFICE VISIT (OUTPATIENT)
Dept: MATERNAL FETAL MEDICINE | Facility: CLINIC | Age: 33
DRG: 832 | End: 2023-06-08
Attending: ADVANCED PRACTICE MIDWIFE
Payer: MEDICARE

## 2023-06-08 ENCOUNTER — LAB (OUTPATIENT)
Dept: LAB | Facility: CLINIC | Age: 33
DRG: 832 | End: 2023-06-08
Attending: ADVANCED PRACTICE MIDWIFE
Payer: MEDICARE

## 2023-06-08 VITALS
HEART RATE: 74 BPM | WEIGHT: 179.6 LBS | SYSTOLIC BLOOD PRESSURE: 165 MMHG | OXYGEN SATURATION: 100 % | DIASTOLIC BLOOD PRESSURE: 93 MMHG | RESPIRATION RATE: 16 BRPM | BODY MASS INDEX: 35.08 KG/M2

## 2023-06-08 DIAGNOSIS — Z48.298 AFTERCARE FOLLOWING ORGAN TRANSPLANT: ICD-10-CM

## 2023-06-08 DIAGNOSIS — N18.31 STAGE 3A CHRONIC KIDNEY DISEASE (H): ICD-10-CM

## 2023-06-08 DIAGNOSIS — Z94.0 KIDNEY REPLACED BY TRANSPLANT: ICD-10-CM

## 2023-06-08 DIAGNOSIS — E87.8 LOW BICARBONATE: ICD-10-CM

## 2023-06-08 DIAGNOSIS — O09.92 HIGH-RISK PREGNANCY IN SECOND TRIMESTER: ICD-10-CM

## 2023-06-08 DIAGNOSIS — N18.30 CKD (CHRONIC KIDNEY DISEASE) STAGE 3, GFR 30-59 ML/MIN (H): Primary | ICD-10-CM

## 2023-06-08 DIAGNOSIS — E86.0 DEHYDRATION: ICD-10-CM

## 2023-06-08 DIAGNOSIS — G89.18 POST PROCEDURE DISCOMFORT: ICD-10-CM

## 2023-06-08 DIAGNOSIS — O16.2 ELEVATED BLOOD PRESSURE AFFECTING PREGNANCY IN SECOND TRIMESTER, ANTEPARTUM: Primary | ICD-10-CM

## 2023-06-08 DIAGNOSIS — O09.92 SUPERVISION OF HIGH RISK PREGNANCY IN SECOND TRIMESTER: ICD-10-CM

## 2023-06-08 DIAGNOSIS — R12 HEARTBURN: ICD-10-CM

## 2023-06-08 LAB
ABO/RH(D): NORMAL
ALBUMIN MFR UR ELPH: 6.5 MG/DL (ref 1–14)
ALT SERPL W P-5'-P-CCNC: 9 U/L (ref 10–35)
ANION GAP SERPL CALCULATED.3IONS-SCNC: 11 MMOL/L (ref 7–15)
ANTIBODY SCREEN: NEGATIVE
AST SERPL W P-5'-P-CCNC: 11 U/L (ref 10–35)
BASE EXCESS BLDV CALC-SCNC: -4.2 MMOL/L (ref -7.7–1.9)
BASOPHILS # BLD AUTO: 0 10E3/UL (ref 0–0.2)
BASOPHILS NFR BLD AUTO: 0 %
BUN SERPL-MCNC: 24.5 MG/DL (ref 6–20)
CALCIUM SERPL-MCNC: 9.1 MG/DL (ref 8.6–10)
CHLORIDE SERPL-SCNC: 105 MMOL/L (ref 98–107)
CREAT SERPL-MCNC: 1.48 MG/DL (ref 0.51–0.95)
CREAT UR-MCNC: 77.6 MG/DL
DEPRECATED HCO3 PLAS-SCNC: 19 MMOL/L (ref 22–29)
EOSINOPHIL # BLD AUTO: 0.1 10E3/UL (ref 0–0.7)
EOSINOPHIL NFR BLD AUTO: 1 %
ERYTHROCYTE [DISTWIDTH] IN BLOOD BY AUTOMATED COUNT: 13.5 % (ref 10–15)
GFR SERPL CREATININE-BSD FRML MDRD: 48 ML/MIN/1.73M2
GLUCOSE SERPL-MCNC: 95 MG/DL (ref 70–99)
HCO3 BLDV-SCNC: 20 MMOL/L (ref 21–28)
HCT VFR BLD AUTO: 25.3 % (ref 35–47)
HGB BLD-MCNC: 8.5 G/DL (ref 11.7–15.7)
HOLD SPECIMEN: NORMAL
IMM GRANULOCYTES # BLD: 0.1 10E3/UL
IMM GRANULOCYTES NFR BLD: 1 %
LYMPHOCYTES # BLD AUTO: 0.8 10E3/UL (ref 0.8–5.3)
LYMPHOCYTES NFR BLD AUTO: 9 %
MCH RBC QN AUTO: 31.3 PG (ref 26.5–33)
MCHC RBC AUTO-ENTMCNC: 33.6 G/DL (ref 31.5–36.5)
MCV RBC AUTO: 93 FL (ref 78–100)
MONOCYTES # BLD AUTO: 0.4 10E3/UL (ref 0–1.3)
MONOCYTES NFR BLD AUTO: 5 %
NEUTROPHILS # BLD AUTO: 6.8 10E3/UL (ref 1.6–8.3)
NEUTROPHILS NFR BLD AUTO: 84 %
NRBC # BLD AUTO: 0 10E3/UL
NRBC BLD AUTO-RTO: 0 /100
NT-PROBNP SERPL-MCNC: 416 PG/ML (ref 0–450)
O2/TOTAL GAS SETTING VFR VENT: 21 %
PCO2 BLDV: 32 MM HG (ref 40–50)
PH BLDV: 7.41 [PH] (ref 7.32–7.43)
PLATELET # BLD AUTO: 188 10E3/UL (ref 150–450)
PO2 BLDV: 72 MM HG (ref 25–47)
POTASSIUM SERPL-SCNC: 4.6 MMOL/L (ref 3.4–5.3)
PROT/CREAT 24H UR: 0.08 MG/MG CR (ref 0–0.2)
RBC # BLD AUTO: 2.72 10E6/UL (ref 3.8–5.2)
SODIUM SERPL-SCNC: 135 MMOL/L (ref 136–145)
SPECIMEN EXPIRATION DATE: NORMAL
TACROLIMUS BLD-MCNC: 4.6 UG/L (ref 5–15)
TME LAST DOSE: ABNORMAL H
TME LAST DOSE: ABNORMAL H
WBC # BLD AUTO: 8.2 10E3/UL (ref 4–11)

## 2023-06-08 PROCEDURE — 59025 FETAL NON-STRESS TEST: CPT | Mod: 26 | Performed by: OBSTETRICS & GYNECOLOGY

## 2023-06-08 PROCEDURE — 36415 COLL VENOUS BLD VENIPUNCTURE: CPT

## 2023-06-08 PROCEDURE — G0463 HOSPITAL OUTPT CLINIC VISIT: HCPCS | Performed by: OBSTETRICS & GYNECOLOGY

## 2023-06-08 PROCEDURE — 36415 COLL VENOUS BLD VENIPUNCTURE: CPT | Performed by: STUDENT IN AN ORGANIZED HEALTH CARE EDUCATION/TRAINING PROGRAM

## 2023-06-08 PROCEDURE — 99207 PR NO BILLABLE SERVICE THIS VISIT: CPT | Performed by: OBSTETRICS & GYNECOLOGY

## 2023-06-08 PROCEDURE — 999N000127 HC STATISTIC PERIPHERAL IV START W US GUIDANCE

## 2023-06-08 PROCEDURE — 120N000002 HC R&B MED SURG/OB UMMC

## 2023-06-08 PROCEDURE — 80197 ASSAY OF TACROLIMUS: CPT

## 2023-06-08 PROCEDURE — 76816 OB US FOLLOW-UP PER FETUS: CPT

## 2023-06-08 PROCEDURE — 87086 URINE CULTURE/COLONY COUNT: CPT | Performed by: STUDENT IN AN ORGANIZED HEALTH CARE EDUCATION/TRAINING PROGRAM

## 2023-06-08 PROCEDURE — G0463 HOSPITAL OUTPT CLINIC VISIT: HCPCS | Mod: 27

## 2023-06-08 PROCEDURE — 76816 OB US FOLLOW-UP PER FETUS: CPT | Mod: 26 | Performed by: OBSTETRICS & GYNECOLOGY

## 2023-06-08 PROCEDURE — 250N000013 HC RX MED GY IP 250 OP 250 PS 637: Performed by: STUDENT IN AN ORGANIZED HEALTH CARE EDUCATION/TRAINING PROGRAM

## 2023-06-08 PROCEDURE — 250N000012 HC RX MED GY IP 250 OP 636 PS 637: Performed by: STUDENT IN AN ORGANIZED HEALTH CARE EDUCATION/TRAINING PROGRAM

## 2023-06-08 PROCEDURE — 84460 ALANINE AMINO (ALT) (SGPT): CPT | Performed by: STUDENT IN AN ORGANIZED HEALTH CARE EDUCATION/TRAINING PROGRAM

## 2023-06-08 PROCEDURE — 86901 BLOOD TYPING SEROLOGIC RH(D): CPT | Performed by: STUDENT IN AN ORGANIZED HEALTH CARE EDUCATION/TRAINING PROGRAM

## 2023-06-08 PROCEDURE — 85025 COMPLETE CBC W/AUTO DIFF WBC: CPT

## 2023-06-08 PROCEDURE — 99223 1ST HOSP IP/OBS HIGH 75: CPT | Mod: 25 | Performed by: OBSTETRICS & GYNECOLOGY

## 2023-06-08 PROCEDURE — 86850 RBC ANTIBODY SCREEN: CPT | Performed by: STUDENT IN AN ORGANIZED HEALTH CARE EDUCATION/TRAINING PROGRAM

## 2023-06-08 PROCEDURE — 83880 ASSAY OF NATRIURETIC PEPTIDE: CPT | Performed by: OBSTETRICS & GYNECOLOGY

## 2023-06-08 PROCEDURE — 82310 ASSAY OF CALCIUM: CPT

## 2023-06-08 PROCEDURE — 84156 ASSAY OF PROTEIN URINE: CPT

## 2023-06-08 PROCEDURE — 82803 BLOOD GASES ANY COMBINATION: CPT

## 2023-06-08 PROCEDURE — 84450 TRANSFERASE (AST) (SGOT): CPT | Performed by: STUDENT IN AN ORGANIZED HEALTH CARE EDUCATION/TRAINING PROGRAM

## 2023-06-08 RX ORDER — DIPHENHYDRAMINE HCL 25 MG
25 CAPSULE ORAL EVERY 6 HOURS PRN
Status: DISCONTINUED | OUTPATIENT
Start: 2023-06-08 | End: 2023-06-10 | Stop reason: HOSPADM

## 2023-06-08 RX ORDER — AZATHIOPRINE 50 MG/1
150 TABLET ORAL AT BEDTIME
Status: DISCONTINUED | OUTPATIENT
Start: 2023-06-08 | End: 2023-06-10 | Stop reason: HOSPADM

## 2023-06-08 RX ORDER — ASPIRIN 81 MG/1
81 TABLET, CHEWABLE ORAL DAILY
Status: DISCONTINUED | OUTPATIENT
Start: 2023-06-08 | End: 2023-06-10 | Stop reason: HOSPADM

## 2023-06-08 RX ORDER — PRENATAL VIT/IRON FUM/FOLIC AC 27MG-0.8MG
1 TABLET ORAL AT BEDTIME
Status: DISCONTINUED | OUTPATIENT
Start: 2023-06-08 | End: 2023-06-10 | Stop reason: HOSPADM

## 2023-06-08 RX ORDER — ONDANSETRON 4 MG/1
4 TABLET, ORALLY DISINTEGRATING ORAL EVERY 6 HOURS PRN
Status: DISCONTINUED | OUTPATIENT
Start: 2023-06-08 | End: 2023-06-10 | Stop reason: HOSPADM

## 2023-06-08 RX ORDER — METOCLOPRAMIDE 10 MG/1
10 TABLET ORAL EVERY 6 HOURS PRN
Status: DISCONTINUED | OUTPATIENT
Start: 2023-06-08 | End: 2023-06-10 | Stop reason: HOSPADM

## 2023-06-08 RX ORDER — DIPHENHYDRAMINE HYDROCHLORIDE 50 MG/ML
25 INJECTION INTRAMUSCULAR; INTRAVENOUS EVERY 6 HOURS PRN
Status: DISCONTINUED | OUTPATIENT
Start: 2023-06-08 | End: 2023-06-10 | Stop reason: HOSPADM

## 2023-06-08 RX ORDER — AMOXICILLIN 250 MG
2 CAPSULE ORAL 2 TIMES DAILY
Status: DISCONTINUED | OUTPATIENT
Start: 2023-06-08 | End: 2023-06-10 | Stop reason: HOSPADM

## 2023-06-08 RX ORDER — ONDANSETRON 2 MG/ML
4 INJECTION INTRAMUSCULAR; INTRAVENOUS EVERY 6 HOURS PRN
Status: DISCONTINUED | OUTPATIENT
Start: 2023-06-08 | End: 2023-06-10 | Stop reason: HOSPADM

## 2023-06-08 RX ORDER — MULTIVITAMIN
1 TABLET ORAL DAILY
Status: DISCONTINUED | OUTPATIENT
Start: 2023-06-08 | End: 2023-06-08

## 2023-06-08 RX ORDER — PROCHLORPERAZINE MALEATE 5 MG
10 TABLET ORAL EVERY 6 HOURS PRN
Status: DISCONTINUED | OUTPATIENT
Start: 2023-06-08 | End: 2023-06-10 | Stop reason: HOSPADM

## 2023-06-08 RX ORDER — FAMOTIDINE 20 MG/1
20 TABLET, FILM COATED ORAL 2 TIMES DAILY PRN
Status: DISCONTINUED | OUTPATIENT
Start: 2023-06-08 | End: 2023-06-10 | Stop reason: HOSPADM

## 2023-06-08 RX ORDER — LANOLIN ALCOHOL/MO/W.PET/CERES
1000 CREAM (GRAM) TOPICAL AT BEDTIME
Status: DISCONTINUED | OUTPATIENT
Start: 2023-06-08 | End: 2023-06-10 | Stop reason: HOSPADM

## 2023-06-08 RX ORDER — SODIUM BICARBONATE 650 MG/1
650 TABLET ORAL 2 TIMES DAILY
Status: DISCONTINUED | OUTPATIENT
Start: 2023-06-08 | End: 2023-06-10 | Stop reason: HOSPADM

## 2023-06-08 RX ORDER — CALCIUM CARBONATE 500 MG/1
1500 TABLET, CHEWABLE ORAL 2 TIMES DAILY WITH MEALS
Status: DISCONTINUED | OUTPATIENT
Start: 2023-06-08 | End: 2023-06-10 | Stop reason: HOSPADM

## 2023-06-08 RX ORDER — METOCLOPRAMIDE HYDROCHLORIDE 5 MG/ML
10 INJECTION INTRAMUSCULAR; INTRAVENOUS EVERY 6 HOURS PRN
Status: DISCONTINUED | OUTPATIENT
Start: 2023-06-08 | End: 2023-06-10 | Stop reason: HOSPADM

## 2023-06-08 RX ORDER — MAGNESIUM OXIDE 400 MG/1
400 TABLET ORAL 2 TIMES DAILY
Status: DISCONTINUED | OUTPATIENT
Start: 2023-06-08 | End: 2023-06-10 | Stop reason: HOSPADM

## 2023-06-08 RX ORDER — LIDOCAINE 40 MG/G
CREAM TOPICAL
Status: DISCONTINUED | OUTPATIENT
Start: 2023-06-08 | End: 2023-06-10 | Stop reason: HOSPADM

## 2023-06-08 RX ORDER — ACETAMINOPHEN 325 MG/1
650 TABLET ORAL EVERY 4 HOURS PRN
Status: DISCONTINUED | OUTPATIENT
Start: 2023-06-08 | End: 2023-06-10 | Stop reason: HOSPADM

## 2023-06-08 RX ORDER — SIMETHICONE 80 MG
160 TABLET,CHEWABLE ORAL 4 TIMES DAILY PRN
Status: DISCONTINUED | OUTPATIENT
Start: 2023-06-08 | End: 2023-06-10 | Stop reason: HOSPADM

## 2023-06-08 RX ORDER — LEVOTHYROXINE SODIUM 25 UG/1
25 TABLET ORAL DAILY
Status: DISCONTINUED | OUTPATIENT
Start: 2023-06-09 | End: 2023-06-08

## 2023-06-08 RX ORDER — PROCHLORPERAZINE 25 MG
25 SUPPOSITORY, RECTAL RECTAL EVERY 12 HOURS PRN
Status: DISCONTINUED | OUTPATIENT
Start: 2023-06-08 | End: 2023-06-10 | Stop reason: HOSPADM

## 2023-06-08 RX ORDER — AMOXICILLIN 250 MG
1 CAPSULE ORAL 2 TIMES DAILY
Status: DISCONTINUED | OUTPATIENT
Start: 2023-06-08 | End: 2023-06-10 | Stop reason: HOSPADM

## 2023-06-08 RX ORDER — HYDROXYZINE HYDROCHLORIDE 50 MG/1
50 TABLET, FILM COATED ORAL
Status: DISCONTINUED | OUTPATIENT
Start: 2023-06-08 | End: 2023-06-10 | Stop reason: HOSPADM

## 2023-06-08 RX ORDER — FAMOTIDINE 20 MG/1
20 TABLET, FILM COATED ORAL 2 TIMES DAILY
Status: DISCONTINUED | OUTPATIENT
Start: 2023-06-08 | End: 2023-06-08

## 2023-06-08 RX ORDER — SODIUM CHLORIDE, SODIUM LACTATE, POTASSIUM CHLORIDE, CALCIUM CHLORIDE 600; 310; 30; 20 MG/100ML; MG/100ML; MG/100ML; MG/100ML
INJECTION, SOLUTION INTRAVENOUS CONTINUOUS
Status: DISCONTINUED | OUTPATIENT
Start: 2023-06-08 | End: 2023-06-10 | Stop reason: HOSPADM

## 2023-06-08 RX ORDER — POLYETHYLENE GLYCOL 3350 17 G/17G
17 POWDER, FOR SOLUTION ORAL DAILY
Status: DISCONTINUED | OUTPATIENT
Start: 2023-06-08 | End: 2023-06-10 | Stop reason: HOSPADM

## 2023-06-08 RX ORDER — CALCITRIOL 0.25 UG/1
0.25 CAPSULE, LIQUID FILLED ORAL DAILY
Status: DISCONTINUED | OUTPATIENT
Start: 2023-06-09 | End: 2023-06-10 | Stop reason: HOSPADM

## 2023-06-08 RX ADMIN — ACETAMINOPHEN 650 MG: 325 TABLET, FILM COATED ORAL at 19:33

## 2023-06-08 RX ADMIN — PRENATAL VIT W/ FE FUMARATE-FA TAB 27-0.8 MG 1 TABLET: 27-0.8 TAB at 22:18

## 2023-06-08 RX ADMIN — CYANOCOBALAMIN TAB 1000 MCG 1000 MCG: 1000 TAB at 22:18

## 2023-06-08 RX ADMIN — MAGNESIUM OXIDE TAB 400 MG (241.3 MG ELEMENTAL MG) 400 MG: 400 (241.3 MG) TAB at 22:18

## 2023-06-08 RX ADMIN — SODIUM BICARBONATE 650 MG TABLET 650 MG: at 22:20

## 2023-06-08 RX ADMIN — TACROLIMUS 7 MG: 5 CAPSULE ORAL at 22:17

## 2023-06-08 RX ADMIN — AZATHIOPRINE 150 MG: 50 TABLET ORAL at 22:19

## 2023-06-08 RX ADMIN — CALCIUM CARBONATE (ANTACID) CHEW TAB 500 MG 1500 MG: 500 CHEW TAB at 22:20

## 2023-06-08 ASSESSMENT — ACTIVITIES OF DAILY LIVING (ADL)
ADLS_ACUITY_SCORE: 31
DRESSING/BATHING_DIFFICULTY: NO
DIFFICULTY_EATING/SWALLOWING: NO
VISION_MANAGEMENT: GLASSES
ADLS_ACUITY_SCORE: 20
FALL_HISTORY_WITHIN_LAST_SIX_MONTHS: NO
DIFFICULTY_COMMUNICATING: NO
HEARING_DIFFICULTY_OR_DEAF: NO
ADLS_ACUITY_SCORE: 20
WALKING_OR_CLIMBING_STAIRS_DIFFICULTY: NO
CHANGE_IN_FUNCTIONAL_STATUS_SINCE_ONSET_OF_CURRENT_ILLNESS/INJURY: NO
ADLS_ACUITY_SCORE: 31
ADLS_ACUITY_SCORE: 31
TOILETING_ISSUES: NO
CONCENTRATING,_REMEMBERING_OR_MAKING_DECISIONS_DIFFICULTY: NO
DOING_ERRANDS_INDEPENDENTLY_DIFFICULTY: NO
WEAR_GLASSES_OR_BLIND: YES
ADLS_ACUITY_SCORE: 31

## 2023-06-08 NOTE — PROGRESS NOTES
Maternal-Fetal Medicine OB Follow Up Visit     Mona Wganer  : 1990  MRN: [id number]    CC: OB Follow-up    Subjective:  Mona Wagner is a 32 year old  at 24w6d x LMP c/w 8w4d sono presenting for OB follow-up.    Mona reports she has overall been okay.  Has noted 2 days of lower extremity swelling.  She takes her BP at home multiple times daily and reports values in 120s-130s/80s-90s, she reports highest value 140 systolic.  She denies headache, chest pain, shortness of breath, visual change, RUQ pain, nausea, vomiting, fever, chills.    She met with IR outpatient 23 at the direction of Dr. Mcintyre, her transplant team, and they plan to have an outpatient percutaneous nephrostomy for hydronephrosis of her transplant.    She denies vaginal bleeding, LOF, contractions.  Active fetal movement.    Of note, she is currently using Bicab 650 mg BID.  She received Aranesp (EPO) 23.  She also reports getting 1 L of LR infusion at that time.    Objective:  BP (!) 165/93   Pulse 74   Resp 16   Wt 81.5 kg (179 lb 9.6 oz)   LMP 2022   SpO2 100%   BMI 35.08 kg/m       Initial /91  Recheck 153/90  Recheck 135/89  Recheck 165/93    Gen: alert, oriented, appears comfortable  Heart:  regular rate and rhythm  Lungs: clear to auscultation bilateral fields  Abdominal: gravid, non-tender  Extremities: AV fistula in place on left forearm; 1+ edema bilateral lower extremities  Psych:  Normal mood and affect    Ultrasound:  DATING  ---------------------------------------------------------------------------------------------------------                                           Date                                Details                                                                                      Gest. age                      KRISSY  LMP                                  2022                                                                                                                       24  w + 6 d                     9/22/2023  Prior assessment               2/16/2023                         GA: 8 w + 4 d                                                                            24 w + 4 d                     9/24/2023  U/S                                   6/8/2023                          based upon AC, BPD, Femur, HC                                                 24 w + 3 d                     9/25/2023  Assigned dating                  Dating performed on 02/28/2023, based on the LMP                                                            24 w + 6 d                     9/22/2023        GENERAL EVALUATION  ---------------------------------------------------------------------------------------------------------  Cardiac activity present.  bpm.  Fetal movements present.  Presentation cephalic.  Placenta Anterior, No Previa, > 2 cm from internal os.  Umbilical cord 3 vessel cord, normal insertion.  Amniotic fluid MVP 5.0 cm, normal MVP.        FETAL BIOMETRY  ---------------------------------------------------------------------------------------------------------  Main Fetal Biometry:  BPD                                        61.1                    mm                         24w 6d                Hadlock  OFD                                        77.6                    mm                         23w 5d                Nicolaides  HC                                          224.8                  mm                          24w 4d                Hadlock  Cerebellum tr                            26.5                   mm                          24w 2d                Nicolaides  AC                                          194.4                  mm                          24w 1d        20%        Hadlock  Femur                                      42.5                   mm                          23w 6d                Hadlock  Fetal Weight Calculation:  EFW                                        662                     g                                     15%        Hadlock  EFW (lb,oz)                             1 lb 7                  oz  EFW by                                        Hadlock (BPD-HC-AC-FL)  Head / Face / Neck Biometry:                                             4.5                     mm  CM                                          6.9                     mm        FETAL ANATOMY  ---------------------------------------------------------------------------------------------------------  Heart / Thorax                      4-chamber view: Echogenic intracardiac focus     The following structures appear normal:  Head / Neck                         Cranium. Head size. Head shape. Lateral ventricles. Midline falx. Cavum septi pellucidi. Cerebellum. Cisterna magna. Thalami.  Face                                   Lips. Profile. Nose.  Heart / Thorax                      RVOT view. LVOT view. 3-vessel-trachea view.                                             Diaphragm.  Abdomen                             Stomach. Kidneys. Bladder.  Spine                                  Cervical spine. Thoracic spine. Lumbar spine. Sacral spine.     Gender: male.        MATERNAL STRUCTURES  ---------------------------------------------------------------------------------------------------------  Cervix                                  Suboptimal  Right Ovary                          Not examined  Left Ovary                            Not examined        RECOMMENDATION  ---------------------------------------------------------------------------------------------------------     Permission was requested and granted from the patient to discuss the following topics:     Recommend referral to L and D for management of severe range BP detected today.     Surveillance for ongoing pregnancy will include fetal growth scan in 3 weeks. Subsequent management will depend on findings on L and D  evaluation.     Patient is aware and understands why she has come for her ultrasound today and states that she feels that all of her questions have been answered to her satisfaction.     Thank you for the opportunity to participate in the care of this patient. If you have questions regarding today's evaluation or if we can be of further service, please contact the  [facility].     **Fetal anomalies may be present but not detected*                                                                         IMPRESSION  ---------------------------------------------------------------------------------------------------------     Patient here for a fetal growth scan secondary to a diagnosis of renal transplant and hydronephrosis. She is at 24w6d gestational age.     Active single fetus with behavior appropriate for gestational age.     Appropriate interval fetal growth.     Estimated fetal weight is appropriate for gestational age.     None of the anomalies commonly detected by ultrasound were evident in the limited fetal anatomic survey described above. Adequate heart views obtained.     Normal amniotic fluid volume.    Imagin23 renal sono:  1.  Right renal transplant demonstrates moderate hydroureteronephrosis, unchanged after voiding. This is similar to CT 2022 and worse compared to ultrasound 2021.  2.  Normal doppler evaluation of the renal transplant.    Assessment/Plan:  Mona Wagner is a 32 year old  at 24w6d x LMP c/w 8w4d sono presenting for OB follow-up.    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 Has not had mild range BP in this BP.  - 2 SR BP today in clinic >15 min apart with also mild  range BP.  She reports increased swelling and at home BP as high as 140 systolic.  She is otherwise asymptomatic.  - Labs obtained prior to today's visit stable (Creatinine 1.48, AST and ALT not done, hgb 8.5, )  - urine protein/creatinine stable at 0.08 today  - recommend evaluation on L&D for continued BP surveillance, discussed the spectrum of g htn, pre-eclampsia with patient  - Discussed that hypertension has also been described following the use of erythropoiesis-stimulating agents after renal transplant. However this should be a diagnosis of exclusion after ruling out pre-eclampsia.    Renal transplant  - Follows with Dr. Mcintyre in transplant nephrology; monitoring labs closely. Given hydronephrosis on US and increased Cr, consideration for nephrostomy tube by Dr. Mcintyre's team. She has seen IR. Message sent today to IR and Dr. Mcintyre with plan for pre and post NST.  Requested that when scheduled contact Charlene Augustine RN or L&D nursing to inform as to need for pre and post NST.  - Resolution of cHTN s/p transplant.  - Continue with Tacrolimus (goal 4-6) and Azathioprine as prescribed by nephrology. Dose recently increased to 7 mg BID.  - Baseline Cr 0.8-1.1; highest Cr 1.69; today 1.48, mild proteinuria UPC ratio 0.25. in earlier pregnancy, today 0.08  - Close management of blood pressure with goal of <130/90.  - Early detection and treatment of asymptomatic bacteriuria with urine culture at least every trimester. UA/UC last on 3/29 and WNL.  Will add on today.  - Continue low dose ASA    Hypothyroidism  Secondary Hyperparathyroidism with hypercalcemia s/p parathyroidectomy  - Resection of parathyroid glands on 4/28/23  - Follows with endo. Continue current levothyroxine prescription 37.5 mcg daily.  - TSH 2.28 on 6/6/23, free T4 1.0 and free T3 2.2  - per endo recheck labs in 1 month     Anemia of Chronic Disease  - On erythropoietin stimulating agents  - Last Hgb 8.5    Hx Provoked DVT in    - Assessment for inherited thrombophilias including Factor V Leiden and Prothrombin Gene Mutation: Negative.  - Additional hypercoagulability work up negative.  - Not on prophylactic anticoagulation in line with ACOG recommendations.     Hx of bacterial endocarditis:  - Echo on 3/20, EF 70%  - Will need to discuss whether she requires SBE prophylaxis intrapartum; this was not addressed today     Routine PNC:  - Prenatal labs:               Rh: +  antibody: neg               HepB/HIV/RPR/HepC: nonreactive               GC/CT: neg               Rubella: non-immune  Varicella: non-immune               GCT: passed early GCT. Will repeat 24-28 weeks               UC: no growth               Pap: NIL and HPV neg  - Immunizations: s/p Flu and Covid. Recommend Tdap at 28 weeks  - Genetic screening: NIPT low-risk. Low-risk NT     FATOUMATA  - Continue CPAP     Surveillance  - Serial growth US q4 after anatomy scan  - Weekly BPPs at 32 weeks, may need to be moved up to 28 weeks      Delivery Planning  - TBD.  reserved for usual obstetrical indications.   - Feeding: plans to formula feed  - Contraception: needs to be discussed    Discussed with Dr. Brandt    To L&D for BP eval.  If discharged, will want to follow up in 1 week for OB visit.  Will continue to coordinate outpatient nephrostomy placement.    Rosanna Montiel MD   Maternal-Fetal Medicine Fellow, PGY6  2023 2:14 PM

## 2023-06-08 NOTE — PHARMACY-ADMISSION MEDICATION HISTORY
Pharmacist Admission Medication History    Admission medication history is complete. The information provided in this note is only as accurate as the sources available at the time of the update.    Medication reconciliation/reorder completed by provider prior to medication history? Yes    Information Source(s): Patient via phone    Pertinent Information: discussed medication timing - takes at 1000, 2200     Changes made to PTA medication list:    Added: None    Deleted: None    Changed: famotidine to PRN    Allergies reviewed with patient and updates made in EHR: yes    Medication History Completed By: LEIGH ALCALA [facility] 6/8/2023 3:45 PM    Prior to Admission medications    Medication Sig Last Dose Taking? Auth Provider Long Term End Date   acetaminophen (TYLENOL) 325 MG tablet Take 2 tablets (650 mg) by mouth every 4 hours as needed for mild pain Past Month Yes Macarena Bowen MD No    aspirin (ASA) 81 MG chewable tablet Take 1 tablet (81 mg) by mouth daily 6/7/2023 Yes Macarena Bowen MD     azaTHIOprine (IMURAN) 50 MG tablet Take 3 tablets (150 mg) by mouth daily  Patient taking differently: Take 150 mg by mouth every evening 6/7/2023 at 2200 Yes Nakul Hill MD Yes    calcitRIOL (ROCALTROL) 0.25 MCG capsule Take 1 capsule (0.25 mcg) by mouth daily 6/8/2023 at 1000 Yes Gelacio Mcintyre MD Yes    calcium carbonate 1250 MG/5ML SUSP suspension Take 5 mLs by mouth 2 times daily (with meals) Take 7.5 ml twice daily 6/8/2023 Yes Reported, Patient No    cyanocobalamin (VITAMIN B-12) 1000 MCG tablet Take 1 tablet (1,000 mcg) by mouth daily 6/7/2023 at 2200 Yes Sonam Toledo CNM     famotidine (PEPCID) 20 MG tablet Take 1 tablet (20 mg) by mouth 2 times daily  Patient taking differently: Take 20 mg by mouth as needed More than a month Yes Macarena Bowen MD     levothyroxine (SYNTHROID/LEVOTHROID) 25 MCG tablet TAKE 1.5 tablet daily 6/8/2023 Yes Katrin Lopez MD Yes    magnesium  oxide (MAG-OX) 400 MG tablet Take 1 tablet (400 mg) by mouth 2 times daily 6/8/2023 at 1000 Yes Gelacio Mcintyre MD     polyethylene glycol (MIRALAX) 17 GM/Dose powder Take 17 g (1 capful) by mouth daily as needed for constipation More than a month Yes Brian Kohli MD     Prenatal Vit-Fe Fumarate-FA (PRENATAL MULTIVITAMIN W/IRON) 27-0.8 MG tablet Take 1 tablet by mouth daily  Patient taking differently: Take 1 tablet by mouth every evening 6/7/2023 at 2200 Yes Macarena Bowen MD     sodium bicarbonate 650 MG tablet Take 1 tablet (650 mg) by mouth 2 times daily 6/8/2023 at 1000 Yes Gelacio Mcintyre MD     tacrolimus (GENERIC EQUIVALENT) 1 MG capsule Take 2 capsules (2 mg) by mouth 2 times daily Total dose = 7 mg twice daily 6/8/2023 at 1000 Yes Gelacio Mcintyre MD     tacrolimus (GENERIC EQUIVALENT) 5 MG capsule Take 1 capsule (5 mg) by mouth 2 times daily Total dose = 7 mg every AM and 7 mg every PM 6/8/2023 at 1000 Yes Gelacio Mcintyre MD     EPINEPHrine (ANY BX GENERIC EQUIV) 0.3 MG/0.3ML injection 2-pack    Reported, Patient     simethicone (MYLICON) 125 MG chewable tablet Take 1 tablet (125 mg) by mouth 4 times daily as needed for intestinal gas   Macarena Bowen MD Yes

## 2023-06-08 NOTE — PROVIDER NOTIFICATION
Data: Mona Wagner transferred to 426 at 1700, ambulatory and accompanied by RN.   Action: Receiving unit notified of transfer: Yes. Patient and family notified of room change. Report received from Timmy Ray RN at 1700. Belongings sent to receiving unit. Oriented patient to surroundings. Call light within reach.   Response: Patient stable and tolerated transfer. Dr. Jones in Dept and notified that patient transferred to Antepartum room 426, VSS, Patient stable and denies pain. Plan per MD for patient to be NPO starting at midnight, pending Interventional Radiology consult. Pt may be saline locked until then, and when NPO run IV LR at 125 ml/hr. Regular diet ordered for now, and Peds Vascular RN will place SL with Ultrasound, due to Fistula/Limb alert on left forearm. Patient verbalizing agreement with plan.

## 2023-06-08 NOTE — DISCHARGE SUMMARY
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  FATOUMATA  Hypothyroidism  Hx of DVT  Hx of bacterial endocarditis  Bicornuate uterus     Discharge Diagnosis:  Non-sustained severe range blood pressure  Renal transplant  Placement of Percutaneous Nephrostomy Tube  Secondary renal hyperparathyroidism  Intermittent orthostatic hypotension  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  FATOUMATA  Hypothyroidism  Hx of DVT  Hx of bacterial endocarditis  Bicornuate uterus     Procedures:  Placement of Percutaneous Nephrostomy Tube    Consultations:  IR, transplant nephrology     Medications prior to admission:  Medications Prior to Admission   Medication Sig Dispense Refill Last Dose     acetaminophen (TYLENOL) 325 MG tablet Take 2 tablets (650 mg) by mouth every 4 hours as needed for mild pain 100 tablet 3 Past Month     aspirin (ASA) 81 MG chewable tablet Take 1 tablet (81 mg) by mouth daily 90 tablet 3 6/7/2023     azaTHIOprine (IMURAN) 50 MG tablet Take 3 tablets (150 mg) by mouth daily (Patient taking differently: Take 150 mg by mouth every evening) 270 tablet 3 6/7/2023 at 2200     calcitRIOL (ROCALTROL) 0.25 MCG capsule Take 1 capsule (0.25 mcg) by mouth daily 28 capsule 0 6/8/2023 at 1000     calcium carbonate 1250 MG/5ML SUSP suspension Take 5 mLs by mouth 2 times daily (with meals) Take 7.5 ml twice daily   6/8/2023     cyanocobalamin (VITAMIN B-12) 1000 MCG tablet Take 1 tablet (1,000 mcg) by mouth daily 90 tablet 1 6/7/2023 at 2200     famotidine (PEPCID)  20 MG tablet Take 1 tablet (20 mg) by mouth 2 times daily 180 tablet 3 More than a month     levothyroxine (SYNTHROID/LEVOTHROID) 25 MCG tablet TAKE 1.5 tablet daily 135 tablet 3 2023     magnesium oxide (MAG-OX) 400 MG tablet Take 1 tablet (400 mg) by mouth 2 times daily 60 tablet 11 2023 at 1000     polyethylene glycol (MIRALAX) 17 GM/Dose powder Take 17 g (1 capful) by mouth daily as needed for constipation 507 g 3 More than a month     Prenatal Vit-Fe Fumarate-FA (PRENATAL MULTIVITAMIN W/IRON) 27-0.8 MG tablet Take 1 tablet by mouth daily (Patient taking differently: Take 1 tablet by mouth every evening) 90 tablet 3 2023 at 2200     sodium bicarbonate 650 MG tablet Take 1 tablet (650 mg) by mouth 2 times daily 180 tablet 3 2023 at 1000     tacrolimus (GENERIC EQUIVALENT) 1 MG capsule Take 2 capsules (2 mg) by mouth 2 times daily Total dose = 7 mg twice daily 360 capsule 3 2023 at 1000     tacrolimus (GENERIC EQUIVALENT) 5 MG capsule Take 1 capsule (5 mg) by mouth 2 times daily Total dose = 7 mg every AM and 7 mg every PM 60 capsule 11 2023 at 1000     simethicone (MYLICON) 125 MG chewable tablet Take 1 tablet (125 mg) by mouth 4 times daily as needed for intestinal gas 120 tablet 3      [DISCONTINUED] EPINEPHrine (ANY BX GENERIC EQUIV) 0.3 MG/0.3ML injection 2-pack           Brief History of Presentation:    Mona Wagner is a 32 year old  at 24w6d by LMP c/w 8w4d US who presents today from [facility] clinic for further evaluation of two severe range BP. Patient has a significant and complex PMH that includes renal transplant in 2019 not currently on hemodialysis, hydronephrosis of transplant kidney d/t ureteral stenosis, cHTN prior to kidney transplant (now resolved), secondary renal hyperparathyroidism, recent parathyroidectomy, hypothyroidism and bicornuate uterus. Patient was transferred from [facility] clinic today due to severe range blood pressures and denies headache or vision changes.  "Endorses 2x 1-2 minute episodes of RUQ \"discomfort\" yesterday evening and this morning. She describes the discomfort as a \"twinge\" and has not experienced this previously. Further denies nausea, vomiting.      Patient also endorses SOB with light activity for the past month that she believes is connected to her low but stable hemoglobin. Denies lightheadedness and generalized weakness. Patient also notes that she has been experiencing a \"pulsating\" feeling in her posterior neck and back when she stands up for the past week or two. She has not experienced this symptom before.      Patient also notes experiencing significantly edema in her hands and lower bilateral extremities for the past few days, which has significantly improved today. Denies pain in addition to swelling. She otherwise reports good fetal movement. Denies LOF, vaginal bleeding, or contractions.       She has a history of cHTN when she was on dialysis prior to her renal transplant. Reports that after her renal transplant her blood pressures were low. Since becoming pregnant her blood pressures have been normal. They have generally been in the 110s/70s, but more recently have been getting higher. She has noticed that the last few days since getting her Aranesp injection her BP have been in the 130s/70s at home.     Hospital Course:    Her pre-eclampsia labs were reassuring on arrival aside from a stably elevated Cr of 1.48. Her blood pressures were normotensive overnight and her presentation was overall felt to not be consistent with pre-eclampsia with severe features.    She was made NPO and on HD#2 underwent percutaneous nephrostomy tube placement with IR. She recovered well post procedure and did not have any complications. After consultation with Dr. Mcintyre, she was started on labetalol 200 mg bid. She reported nipple pain after her first dose so this medication was stopped.  We reviewed options to try nifedipine or amlodipine which she has " tolerated in the past.  She notes that she would prefer to monitor BP for a few more days given that her UOP had increased markedly and she felt her edema had improved significantly.  She will monitor her BP at home.  If > 130/90 consistently, she will call and we will reevaluate. Epic message sent to transplant team.  She has follow up with MFM clinic on 6/12 and transplant team on 6/13.        Discharge Instructions:  Call or present to labor and delivery if you experience:   -Regular painful contractions concerning for labor   -Leakage of fluid concerning for ruptured membranes   -Decreased fetal movement   -Bright red vaginal bleeding    -Headache, vision changes, upper abdominal pain, significant increase in swelling,   generalized unwell feeling    Call if BP trending above 130/90    Call if fever develops or other concerns with PCN site arise.       Follow up:  Follow up in [facility] clinic on She follow up with MFM clinic on 6/12 and transplant team on 6/13.        Discharge Medications:  Current Discharge Medication List      START taking these medications    Details   oxyCODONE (ROXICODONE) 5 MG tablet Take 1 tablet (5 mg) by mouth every 6 hours as needed for pain  Qty: 6 tablet, Refills: 0    Associated Diagnoses: Post procedure discomfort         CONTINUE these medications which have NOT CHANGED    Details   acetaminophen (TYLENOL) 325 MG tablet Take 2 tablets (650 mg) by mouth every 4 hours as needed for mild pain  Qty: 100 tablet, Refills: 3    Associated Diagnoses: Back pain, unspecified back location, unspecified back pain laterality, unspecified chronicity      aspirin (ASA) 81 MG chewable tablet Take 1 tablet (81 mg) by mouth daily  Qty: 90 tablet, Refills: 3    Associated Diagnoses: Kidney transplanted      azaTHIOprine (IMURAN) 50 MG tablet Take 3 tablets (150 mg) by mouth daily  Qty: 270 tablet, Refills: 3    Associated Diagnoses: Kidney replaced by transplant      calcitRIOL (ROCALTROL) 0.25 MCG  capsule Take 1 capsule (0.25 mcg) by mouth daily  Qty: 28 capsule, Refills: 0    Associated Diagnoses: Kidney replaced by transplant; Aftercare following organ transplant      calcium carbonate 1250 MG/5ML SUSP suspension Take 5 mLs by mouth 2 times daily (with meals) Take 7.5 ml twice daily      cyanocobalamin (VITAMIN B-12) 1000 MCG tablet Take 1 tablet (1,000 mcg) by mouth daily  Qty: 90 tablet, Refills: 1    Associated Diagnoses: Anemia during pregnancy in second trimester      famotidine (PEPCID) 20 MG tablet Take 1 tablet (20 mg) by mouth 2 times daily  Qty: 180 tablet, Refills: 3    Associated Diagnoses: Heartburn      levothyroxine (SYNTHROID/LEVOTHROID) 25 MCG tablet TAKE 1.5 tablet daily  Qty: 135 tablet, Refills: 3    Associated Diagnoses: Acquired hypothyroidism; Hypothyroidism due to Hashimoto's thyroiditis; Kidney replaced by transplant      magnesium oxide (MAG-OX) 400 MG tablet Take 1 tablet (400 mg) by mouth 2 times daily  Qty: 60 tablet, Refills: 11    Associated Diagnoses: Kidney replaced by transplant; Low magnesium level      polyethylene glycol (MIRALAX) 17 GM/Dose powder Take 17 g (1 capful) by mouth daily as needed for constipation  Qty: 507 g, Refills: 3    Associated Diagnoses: Heartburn; Slow transit constipation      Prenatal Vit-Fe Fumarate-FA (PRENATAL MULTIVITAMIN W/IRON) 27-0.8 MG tablet Take 1 tablet by mouth daily  Qty: 90 tablet, Refills: 3    Associated Diagnoses: Pre-conception counseling      sodium bicarbonate 650 MG tablet Take 1 tablet (650 mg) by mouth 2 times daily  Qty: 180 tablet, Refills: 3    Associated Diagnoses: High-risk pregnancy in second trimester; Kidney transplanted      tacrolimus (GENERIC EQUIVALENT) 1 MG capsule Take 2 capsules (2 mg) by mouth 2 times daily Total dose = 7 mg twice daily  Qty: 360 capsule, Refills: 3    Comments: TXP DT 8/3/2019 (Kidney) TXP Dischg DT 8/12/2019 DX Kidney replaced by transplant Z94.0 TX Center [facility]  [facility] ([place], [place])  Associated Diagnoses: Kidney replaced by transplant      tacrolimus (GENERIC EQUIVALENT) 5 MG capsule Take 1 capsule (5 mg) by mouth 2 times daily Total dose = 7 mg every AM and 7 mg every PM  Qty: 60 capsule, Refills: 11    Comments: TXP DT 8/3/2019 (Kidney) TXP Dischg DT 8/12/2019 DX Kidney replaced by transplant Z94.0 TX Center [facility] ([place], [place])  Associated Diagnoses: Kidney replaced by transplant      simethicone (MYLICON) 125 MG chewable tablet Take 1 tablet (125 mg) by mouth 4 times daily as needed for intestinal gas  Qty: 120 tablet, Refills: 3    Associated Diagnoses: Bloated abdomen         STOP taking these medications       EPINEPHrine (ANY BX GENERIC EQUIV) 0.3 MG/0.3ML injection 2-pack Comments:   Reason for Stopping:               Delicia eNil MD PhD  Department of Obstetrics, Gynecology and Women's Health  Maternal Fetal Medicine Division

## 2023-06-08 NOTE — H&P
Maternal Fetal Medicine History and Physical     Mona Wagner MRN# [id number]   YOB: 1990 Age: 32 year old      Date of Admission: 2023    Primary care provider: Macarena Bowen      Assessment and Plan:     32 year old  at 24w6d by LMP c/w 8w4d US who is here for evaluation of severe range blood pressures x2 in clinic. Patient has a complex medical history notable for renal transplant in 2019 secondary to IgA nephropathy and allograft hydronephrosis d/t ureteral stenosis in addition to recent parathyroidectomy. Unclear whether isolated severe range blood pressure is due to development of preeclampsia, which patient is at high risk for, or if blood pressure is secondary to progression of renal disease in the setting of hydronephrosis and worsening creatinine. Will admit patient to M service for further monitoring. Additionally will contact transplant and IR teams to discuss whether there is need for placing nephrostomy tube during this admission. Maternal and fetal status x2 stable.      # Non-sustained severe range blood pressures   # Rule out pre-eclampsia with severe features  Patient reports a history of hypertension in the past when on dialysis prior to her renal transplant, but this resolved after transplant and she has had normal BP throughout pregnancy. She has been checking her BP at home and noticed that SBP has been 130s with DBP in 70s (higher than pre-pregnancy). Also notes that BP have been higher since starting Aranesp she has been getting higher readings at home. As above, she is at high risk of developing pre-eclampsia, thus will continue close monitoring throughout this admission and remainder of pregnancy.    - Serial BP monitoring   - IV Antihypertensives prn for sustained severe range blood pressures (>160/>110)  - Cr 1.48 (peak of 1.6 as below), o/w wnl, UPC today 0.08  - Repeat HELLP labs in AM, sooner prn symptoms or worsening BP    # Hx renal transplant  2019  # Allograft hydronephrosis d/t ureteral stenosis  # IgA nephropathy   Has required stent placement in the past, most recently removed in 2021 due to recurrent UTIs. Now with worsening hydronephrosis on renal US compared to 2021. Followed by Dr. Mcintyre in transplant nephrology and s/p consult with IR on 6/5, they recommend placement of PNT this pregnancy due to worsening hydronephrosis and Cr   - Continue PTA Tacrolimus and Azathioprine (Tacrolimus dose recently increased to 7mg BID)  - Continue PTA ASA, Bicarbonate, Mg   - Weekly Mg level, last 5/30 1.7  - Weekly tacrolimus level, 6/6 level 4.5  - Baseline Cr 0.8-1.1; Cr on admission 1.48; mild proteinuria UPC ratio 0.08.  - Close management of blood pressure with goal of <130/90   - Early detection and treatment of asymptomatic bacteriuria with urine culture at least every trimester. UA/UC last on 3/29 and WNL. UA ordered in clinic today.     #Hypothyroidism  #Secondary Hyperparathyroidism with hypercalcemia s/p parathyroidectomy  - S/p resection of parathyroid glands on 4/28/23  - Follows with endo. Continue current levothyroxine prescription 37.5 mcg daily.  - TSH 2.28 on 6/6/23, free T4 1.0 and free T3 2.2  - Per endo recheck labs in 1 month     # Anemia of Chronic Disease  - On erythropoietin stimulating agents  - Last Hgb 8.5    # Hx Provoked DVT in 2014  - Assessment for inherited thrombophilias including Factor V Leiden and Prothrombin Gene Mutation: Negative.  - Additional hypercoagulability work up negative.  - Not on prophylactic anticoagulation in line with ACOG recommendations.    # Hx of bacterial endocarditis:  - Echo on 3/20, EF 70%  - Will need to discuss whether she requires SBE prophylaxis intrapartum    # FATOUMATA  - Continue CPAP    # PNC  - Rh +, Rubella and Varicella non-immune, HepB/HIV/RPR NR, GCT passed, Hgb 8.5, Plts 188  - Other prenatal labs wnl  - Imaging: placenta anterior, comp US 6/8 EFW 15%, AC 20% (662g)  - Contraception:  "undecided   - Feeding: formula     # FWB:   NST appropriate for gestational age; Cephalic by [facility] today  - BID NST for now   - Serial growth US q4 after anatomy scan  - Weekly BPPs at 32 weeks, may need to be moved up to 28 weeks   - Intrauterine resuscitative measures prn    Patient discussed with Dr. Dunn.     Estefani Melo, MS4  OBGYN [facility]       I was present with the medical student who participated in the service and in the documentation of this note.  I have verified the history and personally performed the physical exam and medical decision making, and have verified the content of the note, which accurately reflect my assessment of the patient and the plan of care.       Vera Jones MD  OBGYN PGY-3  3:36 PM 2023                HPI:     Mona Wagner is a 32 year old  at 24w6d by LMP c/w 8w4d US who presents today from [facility] clinic for further evaluation of two severe range BP. Patient has a significant and complex PMH that includes renal transplant in 2019 not currently on hemodialysis, hydronephrosis of transplant kidney d/t ureteral stenosis, cHTN prior to kidney transplant (now resolved), secondary renal hyperparathyroidism, recent parathyroidectomy, hypothyroidism and bicornuate uterus. Patient was transferred from [facility] clinic today due to severe range blood pressures and denies headache or vision changes. Endorses 2x 1-2 minute episodes of RUQ \"discomfort\" yesterday evening and this morning. She describes the discomfort as a \"twinge\" and has not experienced this previously. Further denies nausea, vomiting.     Patient also endorses SOB with light activity for the past month that she believes is connected to her low but stable hemoglobin. Denies lightheadedness and generalized weakness. Patient also notes that she has been experiencing a \"pulsating\" feeling in her posterior neck and back when she stands up for the past week or two. She has not experienced this symptom before.     Patient " also notes experiencing significantly edema in her hands and lower bilateral extremities for the past few days, which has significantly improved today. Denies pain in addition to swelling. She otherwise reports good fetal movement. Denies LOF, vaginal bleeding, or contractions.      She has a history of cHTN when she was on dialysis prior to her renal transplant. Reports that after her renal transplant her blood pressures were low. Since becoming pregnant her blood pressures have been normal. They have generally been in the 110s/70s, but more recently have been getting higher. She has noticed that the last few days since getting her Aranesp injection her BP have been in the 130s/70s at home.     Patient has been NPO since this morning.     OB History:    OB History    Para Term  AB Living   1 0 0 0 0 0   SAB IAB Ectopic Multiple Live Births   0 0 0 0 0      # Outcome Date GA Lbr Robinson/2nd Weight Sex Delivery Anes PTL Lv   1 Current                 Prenatal Lab Results:  ABO - O   Rh - pos  Ab - neg  Rubella - non-immune  Varicella - non-immune  HepB/HIV/RPR - NR               Past Medical History:     Past Medical History:   Diagnosis Date     Anemia in chronic kidney disease      History of bacterial endocarditis      History of blood transfusion      History of DVT (deep vein thrombosis)      History of seizure      History of subarachnoid hemorrhage      Hydronephrosis      Hypothyroidism      Kidney transplanted 2019    DCD DDKT. Induction with thymo 6mg/kg.     Orthostatic hypotension      FATOUMATA (obstructive sleep apnea)      Pyelonephritis of transplanted kidney 2020     Secondary renal hyperparathyroidism (H)      Urinary tract infection    No h/o asthma.           Past Surgical History:     Past Surgical History:   Procedure Laterality Date     BENCH KIDNEY N/A 8/3/2019    Procedure: BACKBENCH PREPARATION, ALLOGRAFT, KIDNEY;  Surgeon: Dorian Johnson MD;  [facility]      COMBINED CYSTOSCOPY, RETROGRADES, EXCHANGE STENT URETER(S) Right 2020    Procedure: CYSTOSCOPY, CYSOTGRAM, WITH RETROGRADE PYELOGRAM, ureteroscopy,  AND URETERAL STENT;  Surgeon: Wally Britt MD;  [facility]     COMBINED CYSTOSCOPY, RETROGRADES, URETEROSCOPY, LASER HOLMIUM LITHOTRIPSY URETER(S), INSERT STENT N/A 2019    Procedure: CYSTOURETEROSCOPY, WITH RETROGRADE PYELOGRAM of transplant kidney, STENT INSERTION, Laser on standby;  Surgeon: Wally Britt MD;  [facility]     CREATE FISTULA ARTERIOVENOUS UPPER EXTREMITY  2014    Procedure: CREATE FISTULA ARTERIOVENOUS UPPER EXTREMITY;  Left Wrist Arteriovenous Fistula Placement;  Surgeon: Shashi Castro MD;  [facility]     CREATE FISTULA ARTERIOVENOUS UPPER EXTREMITY       CYSTOSCOPY, RETROGRADES, INSERT STENT URETER(S), COMBINED N/A 2020    Procedure: CYSTOSCOPY, WITH RETROGRADE PYELOGRAM, CYSTOGRAM AND URETERAL STENT INSERTION - TRANSPLANT KIDNEY;  Surgeon: Wally Britt MD;  [facility]     IR CEREBRAL ANGIOGRAM  2014     PARATHYROIDECTOMY Bilateral 2023    Procedure: Bilateral Resection of 3 and 1/2 parathyroid glands;  Surgeon: Melissa Jones MD;  Location: [facility] OR     PERCUTANEOUS BIOPSY KIDNEY Right 2019    Procedure: Right Kidney Biopsy;  Surgeon: Deny Rios MD;  [facility]     RASTA/DIALYSIS CATHETER  12/10/2013          TRANSPLANT KIDNEY RECIPIENT  DONOR N/A 8/3/2019    Procedure: TRANSPLANT, KIDNEY, RECIPIENT,  DONOR with Ureteral Stent Placement;  Surgeon: Dorian Johnson MD;  [facility]             Social History:     Social History     Tobacco Use     Smoking status: Never     Smokeless tobacco: Never   Vaping Use     Vaping status: Never Used   Substance Use Topics     Alcohol use: No     Alcohol/week: 0.0 standard drinks of alcohol             Family History:     Family History   Problem Relation Age of Onset     Kidney  Disease Mother      Kidney failure Mother      Coronary Artery Disease Father      Cerebrovascular Disease Father      Heart Disease Father      Sleep Apnea Father      Hypertension Sister      Diabetes Sister      Cerebrovascular Disease Sister      Kidney Disease Sister      Breast Cancer No family hx of      Cancer - colorectal No family hx of      Ovarian Cancer No family hx of      Prostate Cancer No family hx of      Other Cancer No family hx of      Asthma No family hx of      Anesthesia Reaction No family hx of      Deep Vein Thrombosis (DVT) No family hx of      Melanoma No family hx of      Skin Cancer No family hx of      Thrombosis No family hx of    No known family h/o bleeding or clotting disorder  Mom and sister both with IgA nephropathy and renal failure          Immunizations:     Immunization History   Administered Date(s) Administered     COVID-19 Monovalent 18+ (Moderna) 03/20/2021, 04/17/2021, 09/07/2021     COVID-19 Monovalent Booster 18+ (Moderna) 04/22/2022     DTAP (<7y) 09/22/1997     Flu, Unspecified 11/01/2018, 09/03/2020     HEPA 12/31/2008, 09/04/2009     HEPATITIS A (PEDS 12M-18Y) 12/31/2008     HPV 12/31/2008, 09/04/2009, 06/10/2010     HPV Quadrivalent 12/31/2008, 09/04/2009, 06/10/2010     HepA-Peds, Unspecified 09/04/2009     HepB 09/02/1997, 04/22/2005, 08/03/2005     Hepatits B (Peds <19Y) 09/25/1997, 04/22/2005, 08/03/2005     Influenza (IIV3) PF 12/31/2008, 12/23/2013, 10/15/2015, 09/22/2016, 09/25/2017, 09/28/2018     Influenza Vaccine >6 months (Alfuria,Fluzone) 09/19/2014, 10/07/2019, 09/03/2020, 10/13/2021, 09/15/2022     Influenza Vaccine IM Ages 6-35 Months 4 Valent (PF) 09/22/2022     Influenza Vaccine Im 4yrs+ 4 Valent CCIIV4 09/03/2020     MMR 04/22/2005     Mantoux Tuberculin Skin Test 12/12/2013     Meningococcal (Menomune ) 09/04/2009     Meningococcal ACWY (Menactra ) 09/04/2009     OPV, trivalent, live 09/22/1997     Pneumo Conj 13-V (2010&after) 08/20/2021      Pneumococcal 23 valent 12/30/2013, 12/28/2018     Poliovirus, inactivated (IPV) 09/22/1997     TD,PF 7+ (Tenivac) 04/22/2005     TDAP (Adacel,Boostrix) 09/04/2009     TDAP Vaccine (Adacel) 09/04/2009     TDAP Vaccine (Boostrix) 03/17/2022     Varicella 12/31/2008, 09/04/2009            Allergies:     Allergies   Allergen Reactions     Contrast Dye Rash     CT contrast allergy 12/14/19 rash over eyes. Need to have pre medication before a CT WITH CONTRAST      Diatrizoate Rash     CT contrast allergy 12/14/19 rash over eyes. Need to have pre medication before a CT WITH CONTRAST      Lisinopril Swelling     angioedema     Nitrous Oxide Other (See Comments)     Sense of doom     Chlorhexidine Rash     Rash at site     Sulfa Antibiotics Rash     Muscle stiffness of neck     Dapsone      Methemoglobinemia     Furosemide Other (See Comments)     Skin flushing     Metrogel [Metronidazole]      Hives diffusely on body after vaginal administration.     Azithromycin Dizziness and Rash     Cefuroxime Rash             Medications:     Medications Prior to Admission   Medication Sig Dispense Refill Last Dose     calcitRIOL (ROCALTROL) 0.25 MCG capsule Take 1 capsule (0.25 mcg) by mouth daily 28 capsule 0 6/7/2023     levothyroxine (SYNTHROID/LEVOTHROID) 25 MCG tablet TAKE 1.5 tablet daily 135 tablet 3 6/8/2023     tacrolimus (GENERIC EQUIVALENT) 1 MG capsule Take 2 capsules (2 mg) by mouth 2 times daily Total dose = 7 mg twice daily 360 capsule 3 6/8/2023     tacrolimus (GENERIC EQUIVALENT) 5 MG capsule Take 1 capsule (5 mg) by mouth 2 times daily Total dose = 7 mg every AM and 7 mg every PM 60 capsule 11 6/8/2023     acetaminophen (TYLENOL) 325 MG tablet Take 2 tablets (650 mg) by mouth every 4 hours as needed for mild pain 100 tablet 3      aspirin (ASA) 81 MG chewable tablet Take 1 tablet (81 mg) by mouth daily 90 tablet 3      azaTHIOprine (IMURAN) 50 MG tablet Take 3 tablets (150 mg) by mouth daily (Patient taking  differently: Take 150 mg by mouth every evening) 270 tablet 3      calcium carbonate 1250 MG/5ML SUSP suspension Take 7.5 mLs by mouth 2 times daily (with meals) Take 7.5 ml twice daily         cyanocobalamin (VITAMIN B-12) 1000 MCG tablet Take 1 tablet (1,000 mcg) by mouth daily 90 tablet 1      EPINEPHrine (ANY BX GENERIC EQUIV) 0.3 MG/0.3ML injection 2-pack         famotidine (PEPCID) 20 MG tablet Take 1 tablet (20 mg) by mouth 2 times daily (Patient taking differently: Take 20 mg by mouth as needed) 180 tablet 3      magnesium oxide (MAG-OX) 400 MG tablet Take 1 tablet (400 mg) by mouth 2 times daily 60 tablet 11      multivitamin (ONE-DAILY) tablet Take 1 tablet by mouth daily 90 tablet 2      polyethylene glycol (MIRALAX) 17 GM/Dose powder Take 17 g (1 capful) by mouth daily as needed for constipation 507 g 3      Prenatal Vit-Fe Fumarate-FA (PRENATAL MULTIVITAMIN W/IRON) 27-0.8 MG tablet Take 1 tablet by mouth daily (Patient taking differently: Take 1 tablet by mouth every evening) 90 tablet 3      simethicone (MYLICON) 125 MG chewable tablet Take 1 tablet (125 mg) by mouth 4 times daily as needed for intestinal gas 120 tablet 3      sodium bicarbonate 650 MG tablet Take 1 tablet (650 mg) by mouth 2 times daily 180 tablet 3              Review of Systems & Physical Exam:     The Review of Systems is negative other than noted in the HPI      Patient Vitals for the past 24 hrs:   BP Temp Temp [facility]   06/08/23 1522 136/67 -- --   06/08/23 1401 139/71 -- --   06/08/23 1349 133/79 -- --   06/08/23 1332 138/79 -- --   06/08/23 1315 132/74 -- --   06/08/23 1300 133/75 -- --   06/08/23 1251 139/79 97.6  F (36.4  C) Oral     Gen: Sitting up in bed, NAD  CV: RRR, 3/6 systolic flow murmur, no rubs or gallops  Lungs: CTAB, no rales, rhonchi, wheezing, non-labored breathing  Abd: Gravid, non-tender, non-distended  Ext: Trace peripheral extremity edema    SSE: Deferrred    FHT:   Monitoring External  FHT: Baseline 145 bpm;  moderate variability; 10x10 accels present; no decelerations  TOCO irritability, no contractions         Data:      Latest Reference Range & Units 06/08/23 10:09   WBC 4.0 - 11.0 10e3/uL 8.2   Hemoglobin 11.7 - 15.7 g/dL 8.5 (L)   Hematocrit 35.0 - 47.0 % 25.3 (L)   Platelet Count 150 - 450 10e3/uL 188   (L): Data is abnormally low     Latest Reference Range & Units 06/08/23 10:09   Sodium 136 - 145 mmol/L 135 (L)   Potassium 3.4 - 5.3 mmol/L 4.6   Chloride 98 - 107 mmol/L 105   Carbon Dioxide (CO2) 22 - 29 mmol/L 19 (L)   Urea Nitrogen 6.0 - 20.0 mg/dL 24.5 (H)   Creatinine 0.51 - 0.95 mg/dL 1.48 (H)   GFR Estimate >60 mL/min/1.73m2 48 (L)   Calcium 8.6 - 10.0 mg/dL 9.1   Anion Gap 7 - 15 mmol/L 11   (L): Data is abnormally low  (H): Data is abnormally high     Latest Reference Range & Units 06/08/23 13:46   ALT 10 - 35 U/L 9 (L)   AST 10 - 35 U/L 11   (L): Data is abnormally low    MFM US COMPREHENSIVE (6/8):   Patient here for a fetal growth scan secondary to a diagnosis of renal transplant and hydronephrosis. She is at 24w6d gestational age.     Active single fetus with behavior appropriate for gestational age.     Appropriate interval fetal growth.     Estimated fetal weight is appropriate for gestational age.     None of the anomalies commonly detected by ultrasound were evident in the limited fetal anatomic survey described above. Adequate heart views obtained.     Normal amniotic fluid volume.

## 2023-06-08 NOTE — CARE PLAN
Data: Patient presented to [facility] at 1240.   Reason for maternal/fetal assessment per patient is Hypertension  .  Patient is a . Prenatal record reviewed.      OB History    Para Term  AB Living   1 0 0 0 0 0   SAB IAB Ectopic Multiple Live Births   0 0 0 0 0      # Outcome Date GA Lbr Robinson/2nd Weight Sex Delivery Anes PTL Lv   1 Current            . Medical history:   Past Medical History:   Diagnosis Date     Anemia in chronic kidney disease      History of bacterial endocarditis      History of blood transfusion      History of DVT (deep vein thrombosis)      History of seizure      History of subarachnoid hemorrhage      Hydronephrosis      Hypothyroidism      Kidney transplanted 2019    DCD DDKT. Induction with thymo 6mg/kg.     Orthostatic hypotension      FATOUMATA (obstructive sleep apnea)      Pyelonephritis of transplanted kidney 2020     Secondary renal hyperparathyroidism (H)      Urinary tract infection    . Gestational Age 24w6d. VSS. Fetal movement present. Patient denies headache, visual disturbances, epigastric or URQ pain, abdominal pain, rupture of membranes.   Action: Verbal consent for EFM. Triage assessment completed. EFM applied for pre-eclampsia evaluation. Fetal assessment: Presumed adequate fetal oxygenation documented (see flow record).   Response: Dr. Jones and Dr Dunn informed of patient arrival. Plan per provider is admit overnight for close observation of blood pressures, NPO after midnight for IR consult in the morning for possible nephrostomy tube placement. Patient verbalized agreement with plan. Patient transferred to room 426 ambulatory, oriented to room and call light. Report given to Ernestine Araiza RN.

## 2023-06-08 NOTE — PATIENT INSTRUCTIONS
Blood Glucose Screening During Pregnancy  Gestational diabetes is diabetes that only pregnant women get. Changes in your body during pregnancy can cause high blood sugar (glucose). This can cause problems for you and your baby. It is a serious condition. But it can be controlled.     Your healthcare provider will talk with you about blood glucose screening.     Who is at risk for gestational diabetes?  You are at risk of getting gestational diabetes if any of the risk factors below apply to you. The risk for this condition gets higher as your number of risk factors increases:    You are , , , , or .    You weigh more than your healthcare provider says is healthy for you.    You have a relative with diabetes.    You are older than 25.    You had gestational diabetes during a past pregnancy.    You had a stillbirth or a very large baby before.    You have a history of abnormal glucose tolerance.    You have sugar in your urine at the first prenatal visit.    You have metabolic syndrome, PCOS (polycystic ovary syndrome), are using glucocorticoids, or have high blood pressure.    You are pregnant with twins or more  What happens during a screening?  Here is what to expect during a blood glucose screening:    There is conflicting advice for screening. But the American College of Obstetricians and Gynecologists currently advises that all pregnant women be screened for gestational diabetes. When you are screened depends on your risk. Women are tested at 24 to 28 weeks of pregnancy. Women at high risk may be tested when they first learn they are pregnant.    To do the screening, a blood sample is taken. Your blood sugar level is measured.    If the results show a high blood sugar level, a glucose tolerance test may be ordered. You will drink a certain amount of sugar. This test measures how long it takes for sugar to leave your blood. The test will show if you  have gestational diabetes.  What to know if you test positive  Here are some things you need to know:    Gestational diabetes can be treated. The best way to control it is to find out you have it early and start treatment quickly.    This condition can cause problems for the mother during pregnancy. It can also cause problems with the baby during pregnancy, delivery, and after. Treatment greatly lowers the chance for problems.    The changes in your body that cause gestational diabetes normally happen only when you are pregnant. After the baby is born, your body goes back to normal. The condition goes away. But you may be more likely to have type 2 diabetes later. Talk with your healthcare provider about ways to help prevent type 2 diabetes.  Treating gestational diabetes  Here is how to treat gestational diabetes:    You ll need to check your blood sugar often. You can do this at home. Prick your finger and check a drop of blood on a glucose monitor. Your healthcare provider will show you how and when to check your blood sugar. They will talk about your target blood sugar level.    To manage your blood sugar, you will be given a special plan. It will likely include meal planning and getting regular exercise. Some women need to take a hormone called insulin. Others may take medicine to help control their blood sugar.  [employer] last reviewed this educational content on 8/1/2020 2000-2022 [employer]. All rights reserved. This information is not intended as a substitute for professional medical care. Always follow your healthcare professional's instructions.          Tdap (Tetanus, Diphtheria, Pertussis) Vaccine: What You Need to Know    This is a Vaccine Information Statement from the CDC.   Many vaccine information statements are available in Irish and other languages. See www.immunize.org/vis   Hojas de información sobre vacunas están disponibles en español y en muchos otros idiomas. Visite  www.immunize.org/vis   1. Why get vaccinated?   Tdap vaccine can prevent tetanus, diphtheria, and pertussis.   Diphtheria and pertussis spread from person to person. Tetanus enters the body through cuts or wounds.     TETANUS (T) causes painful stiffening of the muscles. Tetanus can lead to serious health problems, including being unable to open the mouth, having trouble swallowing and breathing, or death.    DIPHTHERIA (D) can lead to difficulty breathing, heart failure, paralysis, or death.    PERTUSSIS (aP), also known as  whooping cough,  can cause uncontrollable, violent coughing that makes it hard to breathe, eat, or drink. Pertussis can be extremely serious especially in babies and young children, causing pneumonia, convulsions, brain damage, or death. In teens and adults, it can cause weight loss, loss of bladder control, passing out, and rib fractures from severe coughing.  2. Tdap vaccine   Tdap is only for children 7 years and older, adolescents, and adults.   Adolescents should receive a single dose of Tdap, preferably at age 11 or 12 years.   Pregnant people should get a dose of Tdap during every pregnancy, preferably during the early part of the third trimester, to help protect the  from pertussis. Infants are most at risk for severe, life-threatening complications from pertussis.   Adults who have never received Tdap should get a dose of Tdap.   Also, adults should receive a booster dose of either Tdap or Td (a different vaccine that protects against tetanus and diphtheria but not pertussis) every 10 years , or after 5 years in the case of a severe or dirty wound or burn.   Tdap may be given at the same time as other vaccines.   3. Talk with your health care provider  Tell your vaccination provider if the person getting the vaccine:     Has had an allergic reaction after a previous dose of any vaccine that protects against tetanus, diphtheria, or pertussis , or has any severe, life-threatening  allergies    Has had a coma, decreased level of consciousness, or prolonged seizures within 7 days after a previous dose of any pertussis vaccine (DTP, DTaP, or Tdap)    Has seizures or another nervous system problem    Has ever had Guillain-Barré Syndrome (also called  GBS )    Has had severe pain or swelling after a previous dose of any vaccine that protects against tetanus or diphtheria  In some cases, your health care provider may decide to postpone Tdap vaccination until a future visit.   People with minor illnesses, such as a cold, may be vaccinated. People who are moderately or severely ill should usually wait until they recover before getting Tdap vaccine.   Your health care provider can give you more information.  4. Risks of a vaccine reaction     Pain, redness, or swelling where the shot was given, mild fever, headache, feeling tired, and nausea, vomiting, diarrhea, or stomachache sometimes happen after Tdap vaccination.  People sometimes faint after medical procedures, including vaccination. Tell your provider if you feel dizzy or have vision changes or ringing in the ears.   As with any medicine, there is a very remote chance of a vaccine causing a severe allergic reaction, other serious injury, or death.   5. What if there is a serious problem?  An allergic reaction could occur after the vaccinated person leaves the clinic. If you see signs of a severe allergic reaction (hives, swelling of the face and throat, difficulty breathing, a fast heartbeat, dizziness, or weakness), call 9-1-1 and get the person to the nearest hospital.   For other signs that concern you, call your health care provider.   Adverse reactions should be reported to the Vaccine Adverse Event Reporting System (VAERS). Your health care provider will usually file this report, or you can do it yourself. Visit the VAERS website at www.vaers.hhs.gov or call 1-290.355.1090. VAERS is only for reporting reactions, and VAERS staff members do  not give medical advice.   6. The National Vaccine Injury Compensation Program  The National Vaccine Injury Compensation Program (VICP) is a federal program that was created to compensate people who may have been injured by certain vaccines. Claims regarding alleged injury or death due to vaccination have a time limit for filing, which may be as short as two years. Visit the VICP website at www.[facility].gov/vaccinecompensation or call 1-870.707.2829 to learn about the program and about filing a claim.   7. How can I learn more?     Ask your health care provider.    Call your local or state health department.    Visit the website of the Food and Drug Administration (FDA) for vaccine package inserts and additional information at www.fda.gov/vaccines-blood-biologics/vaccines.  ? Contact the Centers for Disease Control and Prevention (CDC): Call 1-484.472.5622 ( 1-164-BOM-INFO) or  ? Visit CDC s website at www.cdc.gov/vaccines.  Vaccine Information Statement   Tdap (Tetanus, Diphtheria, Pertussis) Vaccine  42 U.S.C.   [id number]  8/6/2021  [employer] last reviewed this educational content on     1719-4322 [employer]. All rights reserved. This information is not intended as a substitute for professional medical care. Always follow your healthcare professional's instructions.

## 2023-06-08 NOTE — TELEPHONE ENCOUNTER
Labs noted below    -  Dear Mona    Here are your labs which are ok - please recheck your labs in 1 month and we might increase your thyroid hormone at that time (your thyroid is ok for now) - it is weight based and likely you need more as you move through pregnancy    If you have any questions, please feel free to contact my nurse at [phone] select option #3 for triage nurse  or  option #1 for scheduling related questions.    Regards    Katrin Lopez MD     Lab on 06/06/2023   Component Date Value Ref Range Status     Sodium 06/06/2023 138  136 - 145 mmol/L Final     Potassium 06/06/2023 4.5  3.4 - 5.3 mmol/L Final     Chloride 06/06/2023 108 (H)  98 - 107 mmol/L Final     Carbon Dioxide (CO2) 06/06/2023 19 (L)  22 - 29 mmol/L Final     Anion Gap 06/06/2023 11  7 - 15 mmol/L Final     Urea Nitrogen 06/06/2023 28.2 (H)  6.0 - 20.0 mg/dL Final     Creatinine 06/06/2023 1.68 (H)  0.51 - 0.95 mg/dL Final     Calcium 06/06/2023 9.3  8.6 - 10.0 mg/dL Final     Glucose 06/06/2023 92  70 - 99 mg/dL Final     GFR Estimate 06/06/2023 42 (L)  >60 mL/min/1.73m2 Final    eGFR calculated using 2021 CKD-EPI equation.     WBC Count 06/06/2023 8.1  4.0 - 11.0 10e3/uL Final     RBC Count 06/06/2023 2.72 (L)  3.80 - 5.20 10e6/uL Final     Hemoglobin 06/06/2023 8.2 (L)  11.7 - 15.7 g/dL Final     Hematocrit 06/06/2023 25.7 (L)  35.0 - 47.0 % Final     MCV 06/06/2023 95  78 - 100 fL Final     MCH 06/06/2023 30.1  26.5 - 33.0 pg Final     MCHC 06/06/2023 31.9  31.5 - 36.5 g/dL Final     RDW 06/06/2023 13.5  10.0 - 15.0 % Final     Platelet Count 06/06/2023 187  150 - 450 10e3/uL Final     Tacrolimus by [employer] Mass Spectrom* 06/06/2023 4.5 (L)  5.0 - 15.0 ug/L Final    Comment: Tacrolimus Reference Range (ug/L):    Kidney Transplant:  Pediatric  0-3 months post transplant: 10-12  3-6 months post transplant: 8-10  6-12 months post transplant: 6-8  >12 months post transplant: 4-7    Adult  0-6 months post transplant: 8-10  6-12 months  post transplant: 6-8  >12 months post transplant: 4-6  >5 years post transplant: 3-5    Heart Transplant:  Pediatric  0-12 months post transplant: 10-15  >12 months post transplant: 5-10    Adult  0-3 months post transplant: 10-15  3-6 months post transplant: 8-12  6-12 months post transplant: 6-12  >12 months post transplant: 6-10    Lung Transplant:  0-12 months post transplant: 10-15  >12 months post transplant: 8-12    Liver Transplant:  Pediatric  0-3 months post transplant: 10-15  3-6 months post transplant: 8-10  6 months-5 years post transplant: 6-8   >5 years post transplant: 1-3    Adult  0-3 months post transplant: 10-12  3-6 months post transplant: 8-10  >6 months post transplant: 6-8    Pancreas Transplant:  0-6                            months post transplant: 8-10  >6 months post transplant: 5-8     Tacrolimus Last Dose Date 06/06/2023 6/5/2023   Final     Tacrolimus Last Dose Time 06/06/2023 10:15 PM   Final     TSH 06/06/2023 2.28  0.30 - 4.20 uIU/mL Final     T3 Free 06/06/2023 2.2  2.0 - 4.4 pg/mL Final     Free T4 06/06/2023 1.00  0.90 - 1.70 ng/dL Final     Calcium, Ionized pH 7.4 06/06/2023 1.17  1.11 - 1.30 mmol/L Final     pH 06/06/2023 7.36  7.35 - 7.45 Final     Calcium, Ionized Measured 06/06/2023 1.20  1.11 - 1.30 mmol/L Final

## 2023-06-08 NOTE — NURSING NOTE
Mona seen in clinic today for OB visit at 24w6d gestation for pregnancy complicated by hx renal transplant, anemia, hx bacterial endocarditis (see report/notes). VSS. Pt reports normal fetal movement, see flowsheet. Pt denies bldg/lof/change in discharge/contractions/headache/vision changes/chest pain/SOB/new hand and face edema. Pt assessed for  labor, infection, fetal wellbeing without concerns. Discussed plan for Tdap, third trimester labs including UC, and 1 hour GCT next visit. Mona reports her mood has improved as she has had time to process her recommendations from nephrology and talk with her family who she notes are supportive. She is starting to feel that a nephrostomy tube would be helpful. She continues her Aranesp infusions. She reports her BPs have started to creep up at home to 125/130s/75. Denies symptoms of pre-eclampsia today, but severe range pressures noted in clinic. Dr. Montiel met with pt and discussed POC. Plan for Mona to present to [facility] for further evaluation of BPs. Follow up in clinic determined at discharge. Pt accompanied to [facility] by Delicia GREER RN.

## 2023-06-09 ENCOUNTER — APPOINTMENT (OUTPATIENT)
Dept: INTERVENTIONAL RADIOLOGY/VASCULAR | Facility: CLINIC | Age: 33
DRG: 832 | End: 2023-06-09
Attending: PHYSICIAN ASSISTANT
Payer: MEDICARE

## 2023-06-09 LAB
ALBUMIN SERPL BCG-MCNC: 3.5 G/DL (ref 3.5–5.2)
ALP SERPL-CCNC: 69 U/L (ref 35–104)
ALT SERPL W P-5'-P-CCNC: 8 U/L (ref 10–35)
ANION GAP SERPL CALCULATED.3IONS-SCNC: 11 MMOL/L (ref 7–15)
AST SERPL W P-5'-P-CCNC: 9 U/L (ref 10–35)
BILIRUB SERPL-MCNC: 0.4 MG/DL
BUN SERPL-MCNC: 23.9 MG/DL (ref 6–20)
CALCIUM SERPL-MCNC: 8.9 MG/DL (ref 8.6–10)
CHLORIDE SERPL-SCNC: 105 MMOL/L (ref 98–107)
CREAT SERPL-MCNC: 1.43 MG/DL (ref 0.51–0.95)
DEPRECATED HCO3 PLAS-SCNC: 20 MMOL/L (ref 22–29)
ERYTHROCYTE [DISTWIDTH] IN BLOOD BY AUTOMATED COUNT: 13.3 % (ref 10–15)
GFR SERPL CREATININE-BSD FRML MDRD: 50 ML/MIN/1.73M2
GLUCOSE SERPL-MCNC: 95 MG/DL (ref 70–99)
HCT VFR BLD AUTO: 24.3 % (ref 35–47)
HGB BLD-MCNC: 7.9 G/DL (ref 11.7–15.7)
INR PPP: 0.99 (ref 0.85–1.15)
MCH RBC QN AUTO: 30.6 PG (ref 26.5–33)
MCHC RBC AUTO-ENTMCNC: 32.5 G/DL (ref 31.5–36.5)
MCV RBC AUTO: 94 FL (ref 78–100)
PLATELET # BLD AUTO: 168 10E3/UL (ref 150–450)
POTASSIUM SERPL-SCNC: 4.5 MMOL/L (ref 3.4–5.3)
PROT SERPL-MCNC: 6.4 G/DL (ref 6.4–8.3)
RBC # BLD AUTO: 2.58 10E6/UL (ref 3.8–5.2)
SODIUM SERPL-SCNC: 136 MMOL/L (ref 136–145)
WBC # BLD AUTO: 7.6 10E3/UL (ref 4–11)

## 2023-06-09 PROCEDURE — 250N000012 HC RX MED GY IP 250 OP 636 PS 637: Performed by: STUDENT IN AN ORGANIZED HEALTH CARE EDUCATION/TRAINING PROGRAM

## 2023-06-09 PROCEDURE — 250N000011 HC RX IP 250 OP 636: Performed by: PHYSICIAN ASSISTANT

## 2023-06-09 PROCEDURE — 85610 PROTHROMBIN TIME: CPT | Performed by: STUDENT IN AN ORGANIZED HEALTH CARE EDUCATION/TRAINING PROGRAM

## 2023-06-09 PROCEDURE — 99232 SBSQ HOSP IP/OBS MODERATE 35: CPT | Mod: 25 | Performed by: OBSTETRICS & GYNECOLOGY

## 2023-06-09 PROCEDURE — C1769 GUIDE WIRE: HCPCS

## 2023-06-09 PROCEDURE — 36415 COLL VENOUS BLD VENIPUNCTURE: CPT | Performed by: STUDENT IN AN ORGANIZED HEALTH CARE EDUCATION/TRAINING PROGRAM

## 2023-06-09 PROCEDURE — 59025 FETAL NON-STRESS TEST: CPT | Mod: 26 | Performed by: OBSTETRICS & GYNECOLOGY

## 2023-06-09 PROCEDURE — 0T9330Z DRAINAGE OF RIGHT KIDNEY PELVIS WITH DRAINAGE DEVICE, PERCUTANEOUS APPROACH: ICD-10-PCS | Performed by: RADIOLOGY

## 2023-06-09 PROCEDURE — 250N000013 HC RX MED GY IP 250 OP 250 PS 637: Performed by: STUDENT IN AN ORGANIZED HEALTH CARE EDUCATION/TRAINING PROGRAM

## 2023-06-09 PROCEDURE — C1729 CATH, DRAINAGE: HCPCS

## 2023-06-09 PROCEDURE — 120N000002 HC R&B MED SURG/OB UMMC

## 2023-06-09 PROCEDURE — 50432 PLMT NEPHROSTOMY CATHETER: CPT | Mod: RT | Performed by: RADIOLOGY

## 2023-06-09 PROCEDURE — 50432 PLMT NEPHROSTOMY CATHETER: CPT

## 2023-06-09 PROCEDURE — 250N000009 HC RX 250: Performed by: PHYSICIAN ASSISTANT

## 2023-06-09 PROCEDURE — 85027 COMPLETE CBC AUTOMATED: CPT | Performed by: STUDENT IN AN ORGANIZED HEALTH CARE EDUCATION/TRAINING PROGRAM

## 2023-06-09 PROCEDURE — 258N000003 HC RX IP 258 OP 636: Performed by: STUDENT IN AN ORGANIZED HEALTH CARE EDUCATION/TRAINING PROGRAM

## 2023-06-09 PROCEDURE — 80053 COMPREHEN METABOLIC PANEL: CPT | Performed by: STUDENT IN AN ORGANIZED HEALTH CARE EDUCATION/TRAINING PROGRAM

## 2023-06-09 RX ORDER — LIDOCAINE 40 MG/G
CREAM TOPICAL
Status: DISCONTINUED | OUTPATIENT
Start: 2023-06-09 | End: 2023-06-10 | Stop reason: HOSPADM

## 2023-06-09 RX ORDER — CEFAZOLIN SODIUM 2 G/100ML
2 INJECTION, SOLUTION INTRAVENOUS
Status: COMPLETED | OUTPATIENT
Start: 2023-06-09 | End: 2023-06-09

## 2023-06-09 RX ORDER — NALOXONE HYDROCHLORIDE 0.4 MG/ML
0.2 INJECTION, SOLUTION INTRAMUSCULAR; INTRAVENOUS; SUBCUTANEOUS
Status: DISCONTINUED | OUTPATIENT
Start: 2023-06-09 | End: 2023-06-10 | Stop reason: HOSPADM

## 2023-06-09 RX ORDER — LABETALOL 200 MG/1
200 TABLET, FILM COATED ORAL EVERY 12 HOURS SCHEDULED
Status: DISCONTINUED | OUTPATIENT
Start: 2023-06-09 | End: 2023-06-10

## 2023-06-09 RX ORDER — NALOXONE HYDROCHLORIDE 0.4 MG/ML
0.4 INJECTION, SOLUTION INTRAMUSCULAR; INTRAVENOUS; SUBCUTANEOUS
Status: DISCONTINUED | OUTPATIENT
Start: 2023-06-09 | End: 2023-06-10 | Stop reason: HOSPADM

## 2023-06-09 RX ORDER — FLUMAZENIL 0.1 MG/ML
0.2 INJECTION, SOLUTION INTRAVENOUS
Status: DISCONTINUED | OUTPATIENT
Start: 2023-06-09 | End: 2023-06-10 | Stop reason: HOSPADM

## 2023-06-09 RX ORDER — OXYCODONE HYDROCHLORIDE 5 MG/1
5 TABLET ORAL EVERY 4 HOURS PRN
Status: DISCONTINUED | OUTPATIENT
Start: 2023-06-09 | End: 2023-06-10 | Stop reason: HOSPADM

## 2023-06-09 RX ORDER — FENTANYL CITRATE 50 UG/ML
25-50 INJECTION, SOLUTION INTRAMUSCULAR; INTRAVENOUS EVERY 5 MIN PRN
Status: DISCONTINUED | OUTPATIENT
Start: 2023-06-09 | End: 2023-06-09

## 2023-06-09 RX ADMIN — Medication 37.5 MCG: at 10:38

## 2023-06-09 RX ADMIN — MAGNESIUM OXIDE TAB 400 MG (241.3 MG ELEMENTAL MG) 400 MG: 400 (241.3 MG) TAB at 10:35

## 2023-06-09 RX ADMIN — OXYCODONE HYDROCHLORIDE 5 MG: 5 TABLET ORAL at 19:57

## 2023-06-09 RX ADMIN — TACROLIMUS 7 MG: 5 CAPSULE ORAL at 10:36

## 2023-06-09 RX ADMIN — ACETAMINOPHEN 650 MG: 325 TABLET, FILM COATED ORAL at 10:45

## 2023-06-09 RX ADMIN — CALCIUM CARBONATE (ANTACID) CHEW TAB 500 MG 1500 MG: 500 CHEW TAB at 22:00

## 2023-06-09 RX ADMIN — FENTANYL CITRATE 25 MCG: 50 INJECTION INTRAMUSCULAR; INTRAVENOUS at 09:27

## 2023-06-09 RX ADMIN — CALCIUM CARBONATE (ANTACID) CHEW TAB 500 MG 1500 MG: 500 CHEW TAB at 10:34

## 2023-06-09 RX ADMIN — LIDOCAINE HYDROCHLORIDE 5 ML: 10 INJECTION, SOLUTION EPIDURAL; INFILTRATION; INTRACAUDAL; PERINEURAL at 09:36

## 2023-06-09 RX ADMIN — CYANOCOBALAMIN TAB 1000 MCG 1000 MCG: 1000 TAB at 22:00

## 2023-06-09 RX ADMIN — CEFAZOLIN SODIUM 2 G: 2 INJECTION, SOLUTION INTRAVENOUS at 09:27

## 2023-06-09 RX ADMIN — PRENATAL VIT W/ FE FUMARATE-FA TAB 27-0.8 MG 1 TABLET: 27-0.8 TAB at 22:01

## 2023-06-09 RX ADMIN — OXYCODONE HYDROCHLORIDE 5 MG: 5 TABLET ORAL at 13:43

## 2023-06-09 RX ADMIN — SODIUM CHLORIDE, POTASSIUM CHLORIDE, SODIUM LACTATE AND CALCIUM CHLORIDE: 600; 310; 30; 20 INJECTION, SOLUTION INTRAVENOUS at 00:05

## 2023-06-09 RX ADMIN — SODIUM BICARBONATE 650 MG TABLET 650 MG: at 22:12

## 2023-06-09 RX ADMIN — LABETALOL HCL 200 MG: 200 TABLET, FILM COATED ORAL at 13:06

## 2023-06-09 RX ADMIN — SIMETHICONE 160 MG: 80 TABLET, CHEWABLE ORAL at 19:57

## 2023-06-09 RX ADMIN — SODIUM BICARBONATE 650 MG TABLET 650 MG: at 10:35

## 2023-06-09 RX ADMIN — POLYETHYLENE GLYCOL 3350 17 G: 17 POWDER, FOR SOLUTION ORAL at 20:51

## 2023-06-09 RX ADMIN — TACROLIMUS 7 MG: 5 CAPSULE ORAL at 22:01

## 2023-06-09 RX ADMIN — AZATHIOPRINE 150 MG: 50 TABLET ORAL at 22:00

## 2023-06-09 RX ADMIN — MAGNESIUM OXIDE TAB 400 MG (241.3 MG ELEMENTAL MG) 400 MG: 400 (241.3 MG) TAB at 22:12

## 2023-06-09 RX ADMIN — CALCITRIOL 0.25 MCG: 0.25 CAPSULE ORAL at 10:34

## 2023-06-09 RX ADMIN — FAMOTIDINE 20 MG: 20 TABLET ORAL at 15:38

## 2023-06-09 RX ADMIN — MIDAZOLAM 1 MG: 1 INJECTION INTRAMUSCULAR; INTRAVENOUS at 09:27

## 2023-06-09 ASSESSMENT — ACTIVITIES OF DAILY LIVING (ADL)
ADLS_ACUITY_SCORE: 20

## 2023-06-09 NOTE — PROGRESS NOTES
Brief Progress Note    In to check on patient after PNT placement with IR.    She is doing well, states procedure went better than expected. Having some soreness, but otherwise feeling well. Feeling baby move, no cramping or contractions.    Patient Vitals for the past 24 hrs:   BP Temp Temp src Pulse Resp SpO2 Weight   23 0915 (!) 143/84 -- -- 78 -- 100 % --   23 0703 -- -- -- -- -- -- (P) 81.1 kg (178 lb 12.8 oz)   23 0343 120/60 -- -- -- -- -- --   23 2316 123/71 -- -- -- -- -- --   23 2301 117/70 -- -- -- -- -- --   23 2300 117/70 -- -- -- -- -- --   23 2245 137/82 -- -- -- -- -- --   23 2240 131/78 -- -- -- -- -- --   23 2215 (!) 145/84 97.8  F (36.6  C) Oral -- 18 -- --   23 1707 137/75 98.5  F (36.9  C) Oral 81 18 99 % --   23 1522 136/67 -- -- -- -- -- --   23 1401 139/71 -- -- -- -- -- --   23 1349 133/79 -- -- -- -- -- --   23 1332 138/79 -- -- -- -- -- --   23 1315 132/74 -- -- -- -- -- --   23 1300 133/75 -- -- -- -- -- --   23 1251 139/79 97.6  F (36.4  C) Oral -- -- -- --     Gen: well appearing, NAD  CV: RRR, 3/6 systolic flow murmur, no rubs/gallops  Pulm: CTAB  Abd: Gravid, soft, non-tender  Ext: Trace edema     A/P: Mona Wagner is a 32 year old  female at 25w0d by LMP c/w 8w4d US, HD#2 admitted for rule out pre-eclampsia with severe features after having two non-sustained severe range blood pressures in clinic. HELLP labs have been within normal limits and BP overnight predominantly normotensive with several low mild range pressures. Pregnancy notable for renal transplant in 2019 secondary to IgA nephropathy and allograft hydronephrosis d/t ureteral stenosis in addition to recent parathyroidectomy. Now POD#0 from percutaneous nephrostomy tube placement with IR and doing well.     Discussed case with Dr. Mcintyre, tranplant nephrologist, who feels blood pressures are reflective of worsening  chronic HTN. Would recommend starting labetalol 200 mg bid, with goal of BP <130/<80. Dr. Neil, [facility] staff, is in agreement. Patient remains high risk for pre-eclampsia and will need close follow up.     Plan to observe BP and postprocedure recovery throughout day today, anticipate discharge tomorrow.    Vera Jones MD  OB/GYN Resident PGY-3  12:05 PM June 9, 2023

## 2023-06-09 NOTE — PLAN OF CARE
Data: Blood pressures within parameters. Signs and symptoms of pre-eclampsia absent. Fetal assessment Appropriate for Gestational Age, monitored in Triage.  Interventions: Monitor blood pressures and indicators of pre-eclampsia every 4 hours. Continue uterine/fetal assessment BID. Activity level Bed rest with bathroom privileges. Preventive measures include Medications, Positioning, and Frequent voiding. Encourage active range of motion and frequent position changes.  Plan: Continue expectant management. Observe for and notify care provider of indicators of pre-eclampsia or signs of fetal/maternal compromise.   Goal Outcome Evaluation:      Overall Patient Progress: no change

## 2023-06-09 NOTE — PROVIDER NOTIFICATION
06/08/23 2054   Provider Notification   Provider Name/Title Dr. Toledo   Method of Notification Electronic Page   Request Evaluate - Remote   Notification Reason Decels   FYI- pt had one episodic decel around 2031, resolved with reposition/void. no further concerns    Response: ok to discontinue monitoring

## 2023-06-09 NOTE — PROGRESS NOTES
Antepartum Progress Note    S: Patient feeling well this morning. No HA, vision changes, CP, SOB, RUQ pain, or significant edema. No contractions, vaginal bleeding, leakage of fluid. Continues to feel good FM. Questions about IR procedure today.     O:   Patient Vitals for the past 24 hrs:   BP Temp Temp src Pulse Resp SpO2 Weight   06/09/23 0915 (!) 143/84 -- -- 78 -- 100 % --   06/09/23 0703 -- -- -- -- -- -- (P) 81.1 kg (178 lb 12.8 oz)   06/09/23 0343 120/60 -- -- -- -- -- --   06/08/23 2316 123/71 -- -- -- -- -- --   06/08/23 2301 117/70 -- -- -- -- -- --   06/08/23 2300 117/70 -- -- -- -- -- --   06/08/23 2245 137/82 -- -- -- -- -- --   06/08/23 2240 131/78 -- -- -- -- -- --   06/08/23 2215 (!) 145/84 97.8  F (36.6  C) Oral -- 18 -- --   06/08/23 1707 137/75 98.5  F (36.9  C) Oral 81 18 99 % --   06/08/23 1522 136/67 -- -- -- -- -- --   06/08/23 1401 139/71 -- -- -- -- -- --   06/08/23 1349 133/79 -- -- -- -- -- --   06/08/23 1332 138/79 -- -- -- -- -- --   06/08/23 1315 132/74 -- -- -- -- -- --   06/08/23 1300 133/75 -- -- -- -- -- --   06/08/23 1251 139/79 97.6  F (36.4  C) Oral -- -- -- --     Gen: Well appearing, NAD  CV: Warm and well perfused   Pulm: Breathing comfortably on room air   Abd: Gravid  Ext: Warm, well perfused, trace edema bilaterally.    Weight:   Wt Readings from Last 2 Encounters:   06/09/23 (P) 81.1 kg (178 lb 12.8 oz)   06/08/23 81.5 kg (179 lb 9.6 oz)     FHT: baseline 140, moderate variability, 10x10 accels, int variable decels  TOCO: 0 ctx in 10 minute period, mild irritability  Appropriate for gestational age     I/Os:   Intake/Output Summary (Last 24 hours) at 6/9/2023 0945  Last data filed at 6/9/2023 0600  Gross per 24 hour   Intake 1350 ml   Output 1650 ml   Net -300 ml     Labs:    Latest Reference Range & Units 06/09/23 06:59   Sodium 136 - 145 mmol/L 136   Potassium 3.4 - 5.3 mmol/L 4.5   Chloride 98 - 107 mmol/L 105   Carbon Dioxide (CO2) 22 - 29 mmol/L 20 (L)   Urea  Nitrogen 6.0 - 20.0 mg/dL 23.9 (H)   Creatinine 0.51 - 0.95 mg/dL 1.43 (H)   GFR Estimate >60 mL/min/1.73m2 50 (L)   Calcium 8.6 - 10.0 mg/dL 8.9   Anion Gap 7 - 15 mmol/L 11   Albumin 3.5 - 5.2 g/dL 3.5   Protein Total 6.4 - 8.3 g/dL 6.4   Alkaline Phosphatase 35 - 104 U/L 69   ALT 10 - 35 U/L 8 (L)   AST 10 - 35 U/L 9 (L)   Bilirubin Total <=1.2 mg/dL 0.4   Glucose 70 - 99 mg/dL 95   (L): Data is abnormally low  (H): Data is abnormally high     Latest Reference Range & Units 23 06:59   WBC 4.0 - 11.0 10e3/uL 7.6   Hemoglobin 11.7 - 15.7 g/dL 7.9 (L)   Hematocrit 35.0 - 47.0 % 24.3 (L)   Platelet Count 150 - 450 10e3/uL 168   (L): Data is abnormally low      A/P: Mona Wagner is a 32 year old  female at 25w0d by LMP c/w 8w4d US, HD#2 admitted for rule out pre-eclampsia with severe features after having two non-sustained severe range blood pressures in clinic. HELLP labs have been within normal limits and BP overnight predominantly normotensive with several low mild range pressures. Pregnancy notable for renal transplant in 2019 secondary to IgA nephropathy and allograft hydronephrosis d/t ureteral stenosis in addition to recent parathyroidectomy. At this point, she does not appear to have pre-eclampsia with severe features, suspected elevated blood pressures in clinic were spurious.     IR consulted yesterday for possible percutaneous nephrostomy tube placement as inpatient (s/p outpatient consult on ). Dr. Mcintyre, pt's transplant nephrologist, agreed with plan. She is scheduled to undergo procedure this morning or early afternoon under light sedation. We reviewed safety in pregnancy and will plan to repeat NST when she returns to antepartum unit.     # Non-sustained severe range blood pressures   # Rule out pre-eclampsia with severe features  - H/o cHTN pre-transplant, normotensive since. Has had mild elevations since receiving EPO on 6/6.  - Serial BP monitoring; predominantly normotensive overnight    - IV Antihypertensives prn for sustained severe range blood pressures (>160/>110)  - Cr 1.48>1.43 (peak of 1.6 as below), o/w wnl, UPC today 0.08  - Thus far, does not meet criteria for pre-eclampsia with severe features as BP in clinic thought to be spurious. However she is at very high risk of developing pre-eclampsia, thus close follow up will be necessary throughout pregnancy.      # Hx renal transplant 2019  # Allograft hydronephrosis d/t ureteral stenosis  # IgA nephropathy   - S/p transplant ureteral stents, most recently removed 2021 due to recurrent UTI  - Worsening hydronephrosis in pregnancy compared to 2021; IR procedure today for PNT placement as above  - Continue PTA Tacrolimus and Azathioprine (Tacrolimus dose recently increased to 7mg BID)  - Continue PTA ASA, Bicarbonate, Mg   - Weekly Mg level, last 5/30 1.7  - Weekly tacrolimus level, 6/6 level 4.5  - Baseline Cr 0.8-1.1; Cr on admission 1.48; mild proteinuria UPC ratio 0.08.  - Close management of blood pressure with goal of <130/90   - Early detection and treatment of asymptomatic bacteriuria with urine culture at least every trimester. UA/UC last on 3/29 and WNL. UC ord'd 6/8 pending.      #Hypothyroidism  #Secondary Hyperparathyroidism with hypercalcemia s/p parathyroidectomy  - S/p resection of parathyroid glands on 4/28/23  - Follows with endo. Continue current levothyroxine prescription 37.5 mcg daily.  - TSH 2.28 on 6/6/23, free T4 1.0 and free T3 2.2  - Per endo recheck labs in 1 month     # Anemia of Chronic Disease  - On erythropoietin stimulating agents  - Last Hgb 8.5     # Hx Provoked DVT in 2014  - Assessment for inherited thrombophilias including Factor V Leiden and Prothrombin Gene Mutation: Negative.  - Additional hypercoagulability work up negative.  - Not on prophylactic anticoagulation in line with ACOG recommendations.     # Hx of bacterial endocarditis:  - Echo on 3/20, EF 70%  - Will need to discuss whether she requires  SBE prophylaxis intrapartum     # FATOUMATA  - Continue CPAP     # PNC  - Rh +, Rubella and Varicella non-immune, HepB/HIV/RPR NR, GCT passed, Hgb 8.5, Plts 188  - Other prenatal labs wnl  - Imaging: placenta anterior, comp US  EFW 15%, AC 20% (662g)  - Contraception: undecided   - Feeding: formula                 # FWB:   NST appropriate for gestational age; Cephalic by MFM US today  - BID NST for now   - Serial growth US q4 after anatomy scan  - Weekly BPPs at 32 weeks, may need to be moved up to 28 weeks   - Intrauterine resuscitative measures prn    Patient seen and care plan discussed with Dr. Neil.      Vera Jones MD  OB/GYN Resident PGY-3  9:55 AM 2023       Physician Attestation   I saw this patient with the resident and agree with the resident/fellow's findings and plan of care as documented in the note.      Key findings: 31 yo  at 25w0d with hx of kidney transplant and concern for raising Creatinine potentially attributable to hydronephrosis.  Normal blood pressure since admission makes preeclampsia unlikely. Plan for PCN today.  Pending how patient does following procedure plan to observation vs discharge.   Planning for fetal monitoring before and after procedure.  NST this AM reassuring for GA.         30 MINUTES SPENT BY ME on the date of service doing chart review, history, exam, documentation & further activities per the note.      Delicia Neil MD  Date of Service (when I saw the patient): 23

## 2023-06-09 NOTE — PRE-PROCEDURE
GENERAL PRE-PROCEDURE:   Procedure:  Right pelvic transplant renal nephrostomy placementy  Date/Time:  6/9/2023 9:02 AM    Verbal consent obtained?: Yes    Written consent obtained?: Yes    Risks and benefits: Risks, benefits and alternatives were discussed    Consent given by:  Patient  Patient states understanding of procedure being performed: Yes    Patient's understanding of procedure matches consent: Yes    Procedure consent matches procedure scheduled: Yes    Appropriately NPO:  Yes  ASA Class:  2  Mallampati  :  Grade 2- soft palate, base of uvula, tonsillar pillars, and portion of posterior pharyngeal wall visible  Lungs:  Lungs clear with good breath sounds bilaterally  Heart:  Normal heart sounds and rate  History & Physical reviewed:  History and physical reviewed and no updates needed  Statement of review:  I have reviewed the lab findings, diagnostic data, medications, and the plan for sedation

## 2023-06-09 NOTE — PROVIDER NOTIFICATION
06/08/23 2249   Provider Notification   Provider Name/Title Dr. Toledo   Method of Notification Electronic Page   Request Evaluate - Remote   Notification Reason Maternal Vital Sign Change     Informed provider of x1 mild range BP of 145/84 with no s/sx. Repeat BP's WDL.

## 2023-06-09 NOTE — CONSULTS
Interventional Radiology  [facility] Consult Note  06/09/23   8:05 AM    Consult Requested: RLQ transplant kidney PNT placement    Recommendations/Plan:  Patient is on IR schedule 6/9/2023 for a image guided percutaneous nephrostomy tube placement. Intra procedure fetal monitoring is not necessary per maternal fetal medicine (MFM). Discussed sedation with MFM and patient is approved to receive a small amount of moderate sedation for the procedure.   Labs WNL for procedure.  Orders entered for procedure, NPO status, and pre procedure IV antibiotics.   Medications to be held include: none  Consent will be done prior to procedure.     Please contact the IR charge RN at [phone] for estimated time of procedure.     Case and imaging discussed with IR attending, Dr. Marie. Recommendations were reviewed with Vera Jones MD    This is a 32 year old female with past medical history of IgA nephropathy and allograft hydronephrosis due to ureteral stenosis.  Patient is G1, P0 at 24 weeks 6 days.  Patient currently admitted to the hospital for management of elevated blood pressures.  Unclear as to whether blood pressures are related to preeclampsia or secondary to progression of renal disease in the setting of hydronephrosis and worsening creatinine.  Patient admitted for fetal monitoring and management of blood pressures.  IR consulted for percutaneous nephrostomy tube placement as approved in the outpatient setting.    Pertinent Imaging Reviewed: Renal Transplant US from 5/31/2023    Expected date of discharge:  TBD    Vitals:   /60 (BP Location: Right arm, Patient Position: Left side, Cuff Size: Adult Regular)   Pulse 81   Temp 97.8  F (36.6  C) (Oral)   Resp 18   Wt (P) 81.1 kg (178 lb 12.8 oz)   LMP 12/16/2022   SpO2 99%   BMI (P) 34.92 kg/m      Pertinent Labs:   Lab Results   Component Value Date    WBC 7.6 06/09/2023    WBC 8.2 06/08/2023    WBC 8.1 06/06/2023    WBC 7.2 07/07/2021     WBC 7.0 06/02/2021    WBC 7.6 05/03/2021     Lab Results   Component Value Date    HGB 7.9 06/09/2023    HGB 8.5 06/08/2023    HGB 8.2 06/06/2023    HGB 12.2 07/07/2021    HGB 12.9 06/02/2021    HGB 12.4 05/03/2021     Lab Results   Component Value Date     06/09/2023     06/08/2023     06/06/2023     07/07/2021     06/02/2021     05/03/2021     Lab Results   Component Value Date    INR 0.99 06/09/2023    INR 0.95 08/30/2019    PTT 33 08/02/2019     Lab Results   Component Value Date    POTASSIUM 4.5 06/09/2023    POTASSIUM 4.4 08/02/2022    POTASSIUM 4.2 07/07/2021        COVID-19 Antibody Results, Testing for Immunity         No data to display            COVID-19 PCR Results         No data to display                Sky Davis PA-C  Interventional Radiology  Pager: [phone]

## 2023-06-09 NOTE — PLAN OF CARE
Patient C/O pain from incision site see MAR.  Nephrostomy tube patent with good amount of pink urine, see I/O flow sheet.  Will continue to monitor and will notify provider if there is a change in status.

## 2023-06-09 NOTE — SEDATION DOCUMENTATION
Fentanyl 25mcg and Versed 1mg PIV given per MD. Sedation procedure time 15 minutes. Patient alert and conversing, calm and denying pain during entire procedure. Report called to bedside RN

## 2023-06-09 NOTE — PLAN OF CARE
VSS. Afebrile.had x1 mild range BP this shift. Reported mild headache in PM resolved with Tylenol.  Denies contractions, cramping, LOF or vaginal bleeding. Reports active fetal movement, see [facility] charting for details. NPO after midnight, and IV fluids started for IR consult in the morning & possible nephrostomy tube placement. Signs and symptoms of infection absent. Continue current plan of care

## 2023-06-09 NOTE — PROGRESS NOTES
Vitals, labs, and progress reviewed. Pt had PNT placed today as inpatient.     She is not meeting criteria for preeclampsia but is still hypertensive, likely driven by worsening GFR due to ureteral obstruction, CHARLINE, and pre-existing HTN.     Recommend starting labetalol 200mg bid. Goal BP<130/80. This was discussed with the MFM team.     She has follow up in clinic with Dr. Hill on 6/13. Recommend bringing BP log.     I have reached out to the patient's coordinator and IR to cancel PNT appointment for 6/26 as it was already placed today as inpatient.     Gelacio Mcintyre MD, SHANT  Transplant Nephrology  Pager: [phone]

## 2023-06-09 NOTE — PROCEDURES
[facility]    Procedure: IR Procedure Note    Date/Time: 6/9/2023 9:52 AM    Performed by: Anna Marie MD  Authorized by: Anna Marie MD      UNIVERSAL PROTOCOL   Site Marked: Yes  Prior Images Obtained and Reviewed:  Yes  Required items: Required blood products, implants, devices and special equipment available    Patient identity confirmed:  Verbally with patient, arm band, provided demographic data and hospital-assigned identification number  Patient was reevaluated immediately before administering moderate or deep sedation or anesthesia  Confirmation Checklist:  Patient's identity using two indicators, relevant allergies, procedure was appropriate and matched the consent or emergent situation and correct equipment/implants were available  Time out: Immediately prior to the procedure a time out was called    Universal Protocol: the Joint Commission Universal Protocol was followed    Preparation: Patient was prepped and draped in usual sterile fashion    ESBL (mL):  1     ANESTHESIA    Anesthesia: Local infiltration  Local Anesthetic:  Lidocaine 1% without epinephrine      SEDATION  Patient Sedated: Yes    Sedation Type:  Moderate (conscious) sedation  Sedation:  Midazolam and fentanyl  Vital signs: Vital signs monitored during sedation    Fluoroscopy Time: 0 minute(s)  See dictated procedure note for full details.  Findings: Right pelvic renal transplant with mild/mod hydro    Specimens: none    Complications: None    Condition: Stable    Plan: Monitor output.  Minimal blood (light pink) in urine should clear in 1-2 days.  If persists, consider discontinuing ASA      PROCEDURE  Describe Procedure: US guidance  Right pelv renal lower pole calyx  8F locking pigtail  Gravity bag  2-0 ethilon  Patient Tolerance:  Patient tolerated the procedure well with no immediate complications  Length of time physician/provider present for 1:1 monitoring during sedation:  20

## 2023-06-10 ENCOUNTER — HOSPITAL ENCOUNTER (INPATIENT)
Facility: CLINIC | Age: 33
End: 2023-06-10
Admitting: OBSTETRICS & GYNECOLOGY
Payer: MEDICARE

## 2023-06-10 VITALS
TEMPERATURE: 98.4 F | SYSTOLIC BLOOD PRESSURE: 129 MMHG | RESPIRATION RATE: 16 BRPM | DIASTOLIC BLOOD PRESSURE: 74 MMHG | HEART RATE: 78 BPM | OXYGEN SATURATION: 96 %

## 2023-06-10 LAB
ANION GAP SERPL CALCULATED.3IONS-SCNC: 11 MMOL/L (ref 7–15)
BACTERIA UR CULT: NORMAL
BUN SERPL-MCNC: 21.9 MG/DL (ref 6–20)
CALCIUM SERPL-MCNC: 9.1 MG/DL (ref 8.6–10)
CHLORIDE SERPL-SCNC: 102 MMOL/L (ref 98–107)
CREAT SERPL-MCNC: 1.38 MG/DL (ref 0.51–0.95)
DEPRECATED HCO3 PLAS-SCNC: 22 MMOL/L (ref 22–29)
GFR SERPL CREATININE-BSD FRML MDRD: 53 ML/MIN/1.73M2
GLUCOSE SERPL-MCNC: 97 MG/DL (ref 70–99)
POTASSIUM SERPL-SCNC: 4.6 MMOL/L (ref 3.4–5.3)
SODIUM SERPL-SCNC: 135 MMOL/L (ref 136–145)

## 2023-06-10 PROCEDURE — 99239 HOSP IP/OBS DSCHRG MGMT >30: CPT | Mod: 25 | Performed by: OBSTETRICS & GYNECOLOGY

## 2023-06-10 PROCEDURE — 59025 FETAL NON-STRESS TEST: CPT | Mod: 26 | Performed by: OBSTETRICS & GYNECOLOGY

## 2023-06-10 PROCEDURE — 80048 BASIC METABOLIC PNL TOTAL CA: CPT | Performed by: STUDENT IN AN ORGANIZED HEALTH CARE EDUCATION/TRAINING PROGRAM

## 2023-06-10 PROCEDURE — 250N000013 HC RX MED GY IP 250 OP 250 PS 637: Performed by: STUDENT IN AN ORGANIZED HEALTH CARE EDUCATION/TRAINING PROGRAM

## 2023-06-10 PROCEDURE — 250N000012 HC RX MED GY IP 250 OP 636 PS 637: Performed by: STUDENT IN AN ORGANIZED HEALTH CARE EDUCATION/TRAINING PROGRAM

## 2023-06-10 PROCEDURE — 36415 COLL VENOUS BLD VENIPUNCTURE: CPT | Performed by: STUDENT IN AN ORGANIZED HEALTH CARE EDUCATION/TRAINING PROGRAM

## 2023-06-10 RX ORDER — OXYCODONE HYDROCHLORIDE 5 MG/1
5 TABLET ORAL EVERY 6 HOURS PRN
Qty: 6 TABLET | Refills: 0 | Status: ON HOLD | OUTPATIENT
Start: 2023-06-10 | End: 2023-06-16

## 2023-06-10 RX ADMIN — Medication 37.5 MCG: at 08:38

## 2023-06-10 RX ADMIN — SODIUM BICARBONATE 650 MG TABLET 650 MG: at 10:25

## 2023-06-10 RX ADMIN — CALCIUM CARBONATE (ANTACID) CHEW TAB 500 MG 1500 MG: 500 CHEW TAB at 10:00

## 2023-06-10 RX ADMIN — TACROLIMUS 7 MG: 5 CAPSULE ORAL at 10:25

## 2023-06-10 RX ADMIN — POLYETHYLENE GLYCOL 3350 17 G: 17 POWDER, FOR SOLUTION ORAL at 10:00

## 2023-06-10 RX ADMIN — CALCITRIOL 0.25 MCG: 0.25 CAPSULE ORAL at 10:25

## 2023-06-10 RX ADMIN — MAGNESIUM OXIDE TAB 400 MG (241.3 MG ELEMENTAL MG) 400 MG: 400 (241.3 MG) TAB at 10:25

## 2023-06-10 ASSESSMENT — ACTIVITIES OF DAILY LIVING (ADL)
ADLS_ACUITY_SCORE: 20

## 2023-06-10 NOTE — PLAN OF CARE
Discharged to home and will follow up in clinic.  Education done on dressing changes and how to care for her nephrostomy tube.

## 2023-06-10 NOTE — PROVIDER NOTIFICATION
"   06/09/23 6848   Provider Notification   Provider Name/Title Dr. Kohli   Method of Notification Electronic Page   Request Evaluate - Remote   Notification Reason Patient Request  (Pt continues to report \"painful stinging\" sensation in her nipples and breast tissue \"every 15 minutes\" pt requesting that I notify you.)         Dr. Kohli met with writer in person.  Advised to try tylenol at this time.  "

## 2023-06-10 NOTE — PROVIDER NOTIFICATION
06/09/23 2030   Provider Notification   Provider Name/Title Dr. Neil   Method of Notification Electronic Page   Request Evaluate - Remote   Notification Reason Patient Request  (Mona HO #[id number] requests miralax. Thank you.)         Provider aware, will adjust orders if able to prior to Resident coming on shift. If order not placed, page Resident when they get here.

## 2023-06-10 NOTE — PLAN OF CARE
Goal Outcome Evaluation:      Plan of Care Reviewed With: patient    Overall Patient Progress: improvingOverall Patient Progress: improving    Outcome Evaluation: VSS, afebrile, pt overall appears to be stable.  Clear, pink tinged urine draining from shunt site.  After 1x prn oxy, pt had adequate pain control for the rest of the night. Pt was up caring for the brooks bag on her own frequently.  Reports passing flatus and some gas cramps with relief.  Denies cramping, you, or vaginal bleeding.

## 2023-06-10 NOTE — PROGRESS NOTES
Antepartum Progress Note    S: Patient feeling well this morning. No HA, vision changes, CP, SOB, RUQ pain.  Some incisional tenderness from PCN placement yesterday.  Has clear yellow urine flowing into bag.   She feels like her edema has improved.       She does note that yesterday she had nipple pain after starting labetalol.  She had read about this as a side effect.     She has previously used amlodipine.       No contractions, vaginal bleeding, leakage of fluid. Continues to feel good FM.     O:   Patient Vitals for the past 24 hrs:   BP Temp Temp src Resp SpO2   06/10/23 0830 129/74 98.4  F (36.9  C) Oral 16 --   06/10/23 0352 112/64 98.3  F (36.8  C) Oral 16 96 %   06/09/23 2348 123/73 98  F (36.7  C) Oral 18 --   06/09/23 2000 139/82 98  F (36.7  C) Oral 18 98 %   06/09/23 1543 105/52 -- -- -- --   06/09/23 1530 -- 98.1  F (36.7  C) Oral 18 --     Gen: Well appearing, NAD  CV: Warm and well perfused   Pulm: Breathing comfortably on room air   Abd: Gravid, Bandage over PCN in RLQ with clear urine draining.   Ext: Warm, well perfused, trace edema bilaterally.    Weight:   Wt Readings from Last 2 Encounters:   06/09/23 (P) 81.1 kg (178 lb 12.8 oz)   06/08/23 81.5 kg (179 lb 9.6 oz)     FHT: baseline 130, moderate variability, 10x10 accels, int variable decels  TOCO: 0 ctx in 10 minute period, mild irritability  Appropriate for gestational age     I/Os:   Intake/Output Summary (Last 24 hours) at 6/9/2023 0945  Last data filed at 6/9/2023 0600  Gross per 24 hour   Intake 1350 ml   Output 1650 ml   Net -300 ml     Labs:    Latest Reference Range & Units 06/09/23 06:59   Sodium 136 - 145 mmol/L 136   Potassium 3.4 - 5.3 mmol/L 4.5   Chloride 98 - 107 mmol/L 105   Carbon Dioxide (CO2) 22 - 29 mmol/L 20 (L)   Urea Nitrogen 6.0 - 20.0 mg/dL 23.9 (H)   Creatinine 0.51 - 0.95 mg/dL 1.43 (H)   GFR Estimate >60 mL/min/1.73m2 50 (L)   Calcium 8.6 - 10.0 mg/dL 8.9   Anion Gap 7 - 15 mmol/L 11   Albumin 3.5 - 5.2 g/dL 3.5    Protein Total 6.4 - 8.3 g/dL 6.4   Alkaline Phosphatase 35 - 104 U/L 69   ALT 10 - 35 U/L 8 (L)   AST 10 - 35 U/L 9 (L)   Bilirubin Total <=1.2 mg/dL 0.4   Glucose 70 - 99 mg/dL 95   (L): Data is abnormally low  (H): Data is abnormally high     Latest Reference Range & Units 23 06:59   WBC 4.0 - 11.0 10e3/uL 7.6   Hemoglobin 11.7 - 15.7 g/dL 7.9 (L)   Hematocrit 35.0 - 47.0 % 24.3 (L)   Platelet Count 150 - 450 10e3/uL 168   (L): Data is abnormally low      A/P: Mona Wagner is a 32 year old  female at 25w1d by LMP c/w 8w4d US, HD#3 admitted for rule out pre-eclampsia with severe features after elevated BP In clinic, preeclampsia ruled out. Pregnancy notable for renal transplant in 2019 secondary to IgA nephropathy and allograft hydronephrosis d/t ureteral stenosis in addition to recent parathyroidectomy. Yesterday she had PCN placed.  Since then she reports improved edema, clear yellow fluid draining.    At this point, she does not appear to have pre-eclampsia with severe features, suspected elevated blood pressures in clinic were spurious.     # Hx renal transplant   # Allograft hydronephrosis d/t ureteral stenosis  # IgA nephropathy   - S/p transplant ureteral stents, most recently removed  due to recurrent UTI  - Worsening hydronephrosis in pregnancy compared to ; IR procedure 6/10/2023 for PNT placement as above  - Continue PTA Tacrolimus and Azathioprine (Tacrolimus dose recently increased to 7mg BID)  - Continue PTA ASA, Bicarbonate, Mg   - Weekly Mg level, last  1.7  - Weekly tacrolimus level,  level 4.5  - Baseline Cr 0.8-1.1; Cr on admission 1.48; mild proteinuria UPC ratio 0.08.  - Close management of blood pressure with goal of <130/90   -Started on 200 mg Bid of labetalol yesterday for some mild range BP,  though stopped due to nipple pain.   -Previously on amlodipine, would like to monitor BP some more before starting either amloidipine or nifedipine. Sent EPIC message to  transplant team.   - Early detection and treatment of asymptomatic bacteriuria with urine culture at least every trimester. UA/UC last on 3/29 and WNL. UC ord'd 6/8 pending.   -Given improved status, will discharge today.      Did not specifically address today:   #Hypothyroidism  #Secondary Hyperparathyroidism with hypercalcemia s/p parathyroidectomy  - S/p resection of parathyroid glands on 4/28/23  - Follows with endo. Continue current levothyroxine prescription 37.5 mcg daily.  - TSH 2.28 on 6/6/23, free T4 1.0 and free T3 2.2  - Per endo recheck labs in 1 month     # Anemia of Chronic Disease  - On erythropoietin stimulating agents  - Last Hgb 8.5     # Hx Provoked DVT in 2014  - Assessment for inherited thrombophilias including Factor V Leiden and Prothrombin Gene Mutation: Negative.  - Additional hypercoagulability work up negative.  - Not on prophylactic anticoagulation in line with ACOG recommendations.     # Hx of bacterial endocarditis:  - Echo on 3/20, EF 70%  - Will need to discuss whether she requires SBE prophylaxis intrapartum     # FATOUMATA  - Continue CPAP     # PNC  - Rh +, Rubella and Varicella non-immune, HepB/HIV/RPR NR, GCT passed, Hgb 8.5, Plts 188  - Other prenatal labs wnl  - Imaging: placenta anterior, comp US 6/8 EFW 15%, AC 20% (662g)  - Contraception: undecided   - Feeding: formula                 # FWB:   NST appropriate for gestational age; Cephalic by MFM US today  - BID NST for now   - Serial growth US q4 after anatomy scan  - Weekly BPPs at 32 weeks, may need to be moved up to 28 weeks   - Intrauterine resuscitative measures harpreet Neil MD PhD  Department of Obstetrics, Gynecology and Women's Health  Maternal Fetal Medicine Division      I spent a total of 35 minutes on the date of this encounter including preparing to see the patient (reviewing medical records/tests), counseling and discussing the plan of care, documenting the visit in the electronic medical record, and  communicating with other health care professionals and/or care coordination.

## 2023-06-11 ENCOUNTER — APPOINTMENT (OUTPATIENT)
Dept: ULTRASOUND IMAGING | Facility: CLINIC | Age: 33
DRG: 831 | End: 2023-06-11
Attending: STUDENT IN AN ORGANIZED HEALTH CARE EDUCATION/TRAINING PROGRAM
Payer: MEDICARE

## 2023-06-11 ENCOUNTER — HOSPITAL ENCOUNTER (INPATIENT)
Facility: CLINIC | Age: 33
LOS: 5 days | Discharge: HOME | DRG: 831 | End: 2023-06-16
Attending: OBSTETRICS & GYNECOLOGY | Admitting: OBSTETRICS & GYNECOLOGY
Payer: MEDICARE

## 2023-06-11 DIAGNOSIS — E03.9 ACQUIRED HYPOTHYROIDISM: ICD-10-CM

## 2023-06-11 DIAGNOSIS — N18.31 STAGE 3A CHRONIC KIDNEY DISEASE (H): Primary | ICD-10-CM

## 2023-06-11 PROBLEM — O47.00 PRETERM CONTRACTIONS: Status: ACTIVE | Noted: 2023-06-11

## 2023-06-11 LAB
ALBUMIN SERPL BCG-MCNC: 4 G/DL (ref 3.5–5.2)
ALBUMIN UR-MCNC: 30 MG/DL
ALP SERPL-CCNC: 85 U/L (ref 35–104)
ALT SERPL W P-5'-P-CCNC: 9 U/L (ref 10–35)
ANION GAP SERPL CALCULATED.3IONS-SCNC: 9 MMOL/L (ref 7–15)
APPEARANCE UR: CLEAR
AST SERPL W P-5'-P-CCNC: 11 U/L (ref 10–35)
BILIRUB SERPL-MCNC: 0.5 MG/DL
BILIRUB UR QL STRIP: NEGATIVE
BUN SERPL-MCNC: 28.5 MG/DL (ref 6–20)
CALCIUM SERPL-MCNC: 9.8 MG/DL (ref 8.6–10)
CHLORIDE SERPL-SCNC: 102 MMOL/L (ref 98–107)
CLUE CELLS: ABNORMAL
COLOR UR AUTO: ABNORMAL
CREAT SERPL-MCNC: 1.57 MG/DL (ref 0.51–0.95)
CRYSTALS AMN MICRO: NORMAL
DEPRECATED HCO3 PLAS-SCNC: 22 MMOL/L (ref 22–29)
ERYTHROCYTE [DISTWIDTH] IN BLOOD BY AUTOMATED COUNT: 13.6 % (ref 10–15)
FFN SPECIMEN INTEGRITY: ABNORMAL
FIBRONECTIN FETAL VAG QL: POSITIVE
GFR SERPL CREATININE-BSD FRML MDRD: 45 ML/MIN/1.73M2
GLUCOSE SERPL-MCNC: 93 MG/DL (ref 70–99)
GLUCOSE UR STRIP-MCNC: NEGATIVE MG/DL
HCT VFR BLD AUTO: 30.1 % (ref 35–47)
HGB BLD-MCNC: 9.9 G/DL (ref 11.7–15.7)
HGB UR QL STRIP: ABNORMAL
KETONES UR STRIP-MCNC: NEGATIVE MG/DL
LEUKOCYTE ESTERASE UR QL STRIP: ABNORMAL
MAGNESIUM SERPL-MCNC: 1.5 MG/DL (ref 1.7–2.3)
MCH RBC QN AUTO: 31.1 PG (ref 26.5–33)
MCHC RBC AUTO-ENTMCNC: 32.9 G/DL (ref 31.5–36.5)
MCV RBC AUTO: 95 FL (ref 78–100)
MUCOUS THREADS #/AREA URNS LPF: PRESENT /LPF
NITRATE UR QL: NEGATIVE
PH UR STRIP: 7.5 [PH] (ref 5–7)
PHOSPHATE SERPL-MCNC: 3.3 MG/DL (ref 2.5–4.5)
PLATELET # BLD AUTO: 223 10E3/UL (ref 150–450)
POTASSIUM SERPL-SCNC: 4.8 MMOL/L (ref 3.4–5.3)
POTASSIUM SERPL-SCNC: 5.6 MMOL/L (ref 3.4–5.3)
POTASSIUM SERPL-SCNC: 5.7 MMOL/L (ref 3.4–5.3)
PROT SERPL-MCNC: 7.6 G/DL (ref 6.4–8.3)
RBC # BLD AUTO: 3.18 10E6/UL (ref 3.8–5.2)
RBC URINE: 151 /HPF
SODIUM SERPL-SCNC: 133 MMOL/L (ref 136–145)
SODIUM SERPL-SCNC: 134 MMOL/L (ref 136–145)
SODIUM SERPL-SCNC: 135 MMOL/L (ref 136–145)
SP GR UR STRIP: 1.01 (ref 1–1.03)
TRICHOMONAS, WET PREP: ABNORMAL
UROBILINOGEN UR STRIP-MCNC: NORMAL MG/DL
WBC # BLD AUTO: 13 10E3/UL (ref 4–11)
WBC URINE: 27 /HPF
WBC'S/HIGH POWER FIELD, WET PREP: ABNORMAL
YEAST, WET PREP: ABNORMAL

## 2023-06-11 PROCEDURE — 250N000011 HC RX IP 250 OP 636: Performed by: STUDENT IN AN ORGANIZED HEALTH CARE EDUCATION/TRAINING PROGRAM

## 2023-06-11 PROCEDURE — 82731 ASSAY OF FETAL FIBRONECTIN: CPT | Performed by: STUDENT IN AN ORGANIZED HEALTH CARE EDUCATION/TRAINING PROGRAM

## 2023-06-11 PROCEDURE — 76776 US EXAM K TRANSPL W/DOPPLER: CPT | Mod: 26 | Performed by: RADIOLOGY

## 2023-06-11 PROCEDURE — 258N000003 HC RX IP 258 OP 636: Performed by: STUDENT IN AN ORGANIZED HEALTH CARE EDUCATION/TRAINING PROGRAM

## 2023-06-11 PROCEDURE — 80053 COMPREHEN METABOLIC PANEL: CPT | Performed by: STUDENT IN AN ORGANIZED HEALTH CARE EDUCATION/TRAINING PROGRAM

## 2023-06-11 PROCEDURE — 84132 ASSAY OF SERUM POTASSIUM: CPT | Performed by: STUDENT IN AN ORGANIZED HEALTH CARE EDUCATION/TRAINING PROGRAM

## 2023-06-11 PROCEDURE — 999N000127 HC STATISTIC PERIPHERAL IV START W US GUIDANCE

## 2023-06-11 PROCEDURE — 84100 ASSAY OF PHOSPHORUS: CPT | Performed by: STUDENT IN AN ORGANIZED HEALTH CARE EDUCATION/TRAINING PROGRAM

## 2023-06-11 PROCEDURE — 87653 STREP B DNA AMP PROBE: CPT | Performed by: STUDENT IN AN ORGANIZED HEALTH CARE EDUCATION/TRAINING PROGRAM

## 2023-06-11 PROCEDURE — 250N000013 HC RX MED GY IP 250 OP 250 PS 637: Performed by: STUDENT IN AN ORGANIZED HEALTH CARE EDUCATION/TRAINING PROGRAM

## 2023-06-11 PROCEDURE — 83735 ASSAY OF MAGNESIUM: CPT | Performed by: STUDENT IN AN ORGANIZED HEALTH CARE EDUCATION/TRAINING PROGRAM

## 2023-06-11 PROCEDURE — 93010 ELECTROCARDIOGRAM REPORT: CPT | Performed by: INTERNAL MEDICINE

## 2023-06-11 PROCEDURE — 93005 ELECTROCARDIOGRAM TRACING: CPT

## 2023-06-11 PROCEDURE — 120N000002 HC R&B MED SURG/OB UMMC

## 2023-06-11 PROCEDURE — 87210 SMEAR WET MOUNT SALINE/INK: CPT | Performed by: STUDENT IN AN ORGANIZED HEALTH CARE EDUCATION/TRAINING PROGRAM

## 2023-06-11 PROCEDURE — 85027 COMPLETE CBC AUTOMATED: CPT | Performed by: STUDENT IN AN ORGANIZED HEALTH CARE EDUCATION/TRAINING PROGRAM

## 2023-06-11 PROCEDURE — 76776 US EXAM K TRANSPL W/DOPPLER: CPT

## 2023-06-11 PROCEDURE — 258N000003 HC RX IP 258 OP 636

## 2023-06-11 PROCEDURE — 84295 ASSAY OF SERUM SODIUM: CPT | Performed by: STUDENT IN AN ORGANIZED HEALTH CARE EDUCATION/TRAINING PROGRAM

## 2023-06-11 PROCEDURE — 999N000285 HC STATISTIC VASC ACCESS LAB DRAW WITH PIV START

## 2023-06-11 PROCEDURE — 250N000012 HC RX MED GY IP 250 OP 636 PS 637: Performed by: STUDENT IN AN ORGANIZED HEALTH CARE EDUCATION/TRAINING PROGRAM

## 2023-06-11 PROCEDURE — 81001 URINALYSIS AUTO W/SCOPE: CPT | Performed by: STUDENT IN AN ORGANIZED HEALTH CARE EDUCATION/TRAINING PROGRAM

## 2023-06-11 PROCEDURE — 36415 COLL VENOUS BLD VENIPUNCTURE: CPT | Performed by: STUDENT IN AN ORGANIZED HEALTH CARE EDUCATION/TRAINING PROGRAM

## 2023-06-11 RX ORDER — METOCLOPRAMIDE HYDROCHLORIDE 5 MG/ML
10 INJECTION INTRAMUSCULAR; INTRAVENOUS EVERY 6 HOURS PRN
Status: DISCONTINUED | OUTPATIENT
Start: 2023-06-11 | End: 2023-06-16 | Stop reason: HOSPADM

## 2023-06-11 RX ORDER — PRENATAL VIT/IRON FUM/FOLIC AC 27MG-0.8MG
1 TABLET ORAL EVERY EVENING
Status: DISCONTINUED | OUTPATIENT
Start: 2023-06-11 | End: 2023-06-16 | Stop reason: HOSPADM

## 2023-06-11 RX ORDER — BETAMETHASONE SODIUM PHOSPHATE AND BETAMETHASONE ACETATE 3; 3 MG/ML; MG/ML
12 INJECTION, SUSPENSION INTRA-ARTICULAR; INTRALESIONAL; INTRAMUSCULAR; SOFT TISSUE EVERY 24 HOURS
Status: COMPLETED | OUTPATIENT
Start: 2023-06-11 | End: 2023-06-12

## 2023-06-11 RX ORDER — TACROLIMUS 1 MG/1
2 CAPSULE ORAL 2 TIMES DAILY
Status: DISCONTINUED | OUTPATIENT
Start: 2023-06-11 | End: 2023-06-12

## 2023-06-11 RX ORDER — SODIUM CHLORIDE 9 MG/ML
INJECTION, SOLUTION INTRAVENOUS
Status: COMPLETED
Start: 2023-06-11 | End: 2023-06-11

## 2023-06-11 RX ORDER — ONDANSETRON 2 MG/ML
4 INJECTION INTRAMUSCULAR; INTRAVENOUS EVERY 6 HOURS PRN
Status: DISCONTINUED | OUTPATIENT
Start: 2023-06-11 | End: 2023-06-16 | Stop reason: HOSPADM

## 2023-06-11 RX ORDER — CALCIUM CARBONATE 1250 MG/5ML
1250 SUSPENSION ORAL 2 TIMES DAILY WITH MEALS
Status: DISCONTINUED | OUTPATIENT
Start: 2023-06-11 | End: 2023-06-16 | Stop reason: HOSPADM

## 2023-06-11 RX ORDER — METOCLOPRAMIDE 10 MG/1
10 TABLET ORAL EVERY 6 HOURS PRN
Status: DISCONTINUED | OUTPATIENT
Start: 2023-06-11 | End: 2023-06-16 | Stop reason: HOSPADM

## 2023-06-11 RX ORDER — AZATHIOPRINE 50 MG/1
150 TABLET ORAL EVERY EVENING
Status: DISCONTINUED | OUTPATIENT
Start: 2023-06-11 | End: 2023-06-16 | Stop reason: HOSPADM

## 2023-06-11 RX ORDER — LANOLIN ALCOHOL/MO/W.PET/CERES
1000 CREAM (GRAM) TOPICAL DAILY
Status: DISCONTINUED | OUTPATIENT
Start: 2023-06-12 | End: 2023-06-16 | Stop reason: HOSPADM

## 2023-06-11 RX ORDER — DEXTROSE MONOHYDRATE 25 G/50ML
25 INJECTION, SOLUTION INTRAVENOUS ONCE
Status: DISCONTINUED | OUTPATIENT
Start: 2023-06-11 | End: 2023-06-11

## 2023-06-11 RX ORDER — SODIUM BICARBONATE 650 MG/1
650 TABLET ORAL 2 TIMES DAILY
Status: DISCONTINUED | OUTPATIENT
Start: 2023-06-11 | End: 2023-06-16 | Stop reason: HOSPADM

## 2023-06-11 RX ORDER — SODIUM CHLORIDE 9 MG/ML
INJECTION, SOLUTION INTRAVENOUS CONTINUOUS
Status: DISCONTINUED | OUTPATIENT
Start: 2023-06-11 | End: 2023-06-12

## 2023-06-11 RX ORDER — TACROLIMUS 5 MG/1
5 CAPSULE ORAL 2 TIMES DAILY
Status: DISCONTINUED | OUTPATIENT
Start: 2023-06-11 | End: 2023-06-12

## 2023-06-11 RX ORDER — FAMOTIDINE 20 MG/1
20 TABLET, FILM COATED ORAL 2 TIMES DAILY PRN
Status: DISCONTINUED | OUTPATIENT
Start: 2023-06-11 | End: 2023-06-16 | Stop reason: HOSPADM

## 2023-06-11 RX ORDER — MAGNESIUM OXIDE 400 MG/1
400 TABLET ORAL 2 TIMES DAILY
Status: DISPENSED | OUTPATIENT
Start: 2023-06-11 | End: 2023-06-15

## 2023-06-11 RX ORDER — SODIUM CHLORIDE, SODIUM LACTATE, POTASSIUM CHLORIDE, CALCIUM CHLORIDE 600; 310; 30; 20 MG/100ML; MG/100ML; MG/100ML; MG/100ML
INJECTION, SOLUTION INTRAVENOUS CONTINUOUS
Status: DISCONTINUED | OUTPATIENT
Start: 2023-06-11 | End: 2023-06-11

## 2023-06-11 RX ORDER — PROCHLORPERAZINE 25 MG
25 SUPPOSITORY, RECTAL RECTAL EVERY 12 HOURS PRN
Status: DISCONTINUED | OUTPATIENT
Start: 2023-06-11 | End: 2023-06-16 | Stop reason: HOSPADM

## 2023-06-11 RX ORDER — FAMOTIDINE 20 MG/1
20 TABLET, FILM COATED ORAL 2 TIMES DAILY
Status: DISCONTINUED | OUTPATIENT
Start: 2023-06-11 | End: 2023-06-11

## 2023-06-11 RX ORDER — PROCHLORPERAZINE MALEATE 5 MG
10 TABLET ORAL EVERY 6 HOURS PRN
Status: DISCONTINUED | OUTPATIENT
Start: 2023-06-11 | End: 2023-06-16 | Stop reason: HOSPADM

## 2023-06-11 RX ORDER — NICOTINE POLACRILEX 4 MG
15-30 LOZENGE BUCCAL
Status: DISCONTINUED | OUTPATIENT
Start: 2023-06-11 | End: 2023-06-16 | Stop reason: HOSPADM

## 2023-06-11 RX ORDER — ONDANSETRON 4 MG/1
4 TABLET, ORALLY DISINTEGRATING ORAL EVERY 6 HOURS PRN
Status: DISCONTINUED | OUTPATIENT
Start: 2023-06-11 | End: 2023-06-16 | Stop reason: HOSPADM

## 2023-06-11 RX ORDER — DEXTROSE MONOHYDRATE 25 G/50ML
25-50 INJECTION, SOLUTION INTRAVENOUS
Status: DISCONTINUED | OUTPATIENT
Start: 2023-06-11 | End: 2023-06-16 | Stop reason: HOSPADM

## 2023-06-11 RX ORDER — CALCITRIOL 0.25 UG/1
0.25 CAPSULE, LIQUID FILLED ORAL DAILY
Status: DISCONTINUED | OUTPATIENT
Start: 2023-06-12 | End: 2023-06-16 | Stop reason: HOSPADM

## 2023-06-11 RX ADMIN — SODIUM CHLORIDE: 9 INJECTION, SOLUTION INTRAVENOUS at 21:07

## 2023-06-11 RX ADMIN — TACROLIMUS 2 MG: 1 CAPSULE ORAL at 23:22

## 2023-06-11 RX ADMIN — SODIUM BICARBONATE 650 MG TABLET 650 MG: at 23:20

## 2023-06-11 RX ADMIN — BETAMETHASONE SODIUM PHOSPHATE AND BETAMETHASONE ACETATE 12 MG: 3; 3 INJECTION, SUSPENSION INTRA-ARTICULAR; INTRALESIONAL; INTRAMUSCULAR at 20:13

## 2023-06-11 RX ADMIN — AZATHIOPRINE 150 MG: 50 TABLET ORAL at 23:19

## 2023-06-11 RX ADMIN — SODIUM CHLORIDE 1000 ML: 9 INJECTION, SOLUTION INTRAVENOUS at 20:02

## 2023-06-11 RX ADMIN — TACROLIMUS 5 MG: 5 CAPSULE ORAL at 23:22

## 2023-06-11 RX ADMIN — PRENATAL VITAMINS-IRON FUMARATE 27 MG IRON-FOLIC ACID 0.8 MG TABLET 1 TABLET: at 23:20

## 2023-06-11 RX ADMIN — MAGNESIUM OXIDE TAB 400 MG (241.3 MG ELEMENTAL MG) 400 MG: 400 (241.3 MG) TAB at 23:20

## 2023-06-11 RX ADMIN — CALCIUM CARBONATE 1250 MG: 1250 SUSPENSION ORAL at 23:19

## 2023-06-11 ASSESSMENT — ACTIVITIES OF DAILY LIVING (ADL)
ADLS_ACUITY_SCORE: 35

## 2023-06-12 LAB
ALBUMIN MFR UR ELPH: 25.1 MG/DL (ref 1–14)
ALBUMIN SERPL BCG-MCNC: 3.6 G/DL (ref 3.5–5.2)
ALP SERPL-CCNC: 85 U/L (ref 35–104)
ALT SERPL W P-5'-P-CCNC: 10 U/L (ref 10–35)
ALT SERPL W P-5'-P-CCNC: 9 U/L (ref 10–35)
ANION GAP SERPL CALCULATED.3IONS-SCNC: 12 MMOL/L (ref 7–15)
ANION GAP SERPL CALCULATED.3IONS-SCNC: 13 MMOL/L (ref 7–15)
ANION GAP SERPL CALCULATED.3IONS-SCNC: 16 MMOL/L (ref 7–15)
ANION GAP SERPL CALCULATED.3IONS-SCNC: 17 MMOL/L (ref 7–15)
APTT PPP: 31 SECONDS (ref 22–38)
AST SERPL W P-5'-P-CCNC: 12 U/L (ref 10–35)
AST SERPL W P-5'-P-CCNC: 13 U/L (ref 10–35)
ATRIAL RATE - MUSE: 85 BPM
BILIRUB SERPL-MCNC: 0.5 MG/DL
BUN SERPL-MCNC: 23.6 MG/DL (ref 6–20)
BUN SERPL-MCNC: 24.1 MG/DL (ref 6–20)
BUN SERPL-MCNC: 25 MG/DL (ref 6–20)
BUN SERPL-MCNC: 26.1 MG/DL (ref 6–20)
CALCIUM SERPL-MCNC: 8.8 MG/DL (ref 8.6–10)
CALCIUM SERPL-MCNC: 9.4 MG/DL (ref 8.6–10)
CALCIUM SERPL-MCNC: 9.4 MG/DL (ref 8.6–10)
CALCIUM SERPL-MCNC: 9.8 MG/DL (ref 8.6–10)
CHLORIDE SERPL-SCNC: 103 MMOL/L (ref 98–107)
CHLORIDE SERPL-SCNC: 106 MMOL/L (ref 98–107)
CHLORIDE SERPL-SCNC: 106 MMOL/L (ref 98–107)
CHLORIDE SERPL-SCNC: 107 MMOL/L (ref 98–107)
CREAT SERPL-MCNC: 1.2 MG/DL (ref 0.51–0.95)
CREAT SERPL-MCNC: 1.22 MG/DL (ref 0.51–0.95)
CREAT SERPL-MCNC: 1.3 MG/DL (ref 0.51–0.95)
CREAT SERPL-MCNC: 1.39 MG/DL (ref 0.51–0.95)
CREAT UR-MCNC: 15.2 MG/DL
CRYSTALS AMN MICRO: NORMAL
DEPRECATED HCO3 PLAS-SCNC: 14 MMOL/L (ref 22–29)
DEPRECATED HCO3 PLAS-SCNC: 15 MMOL/L (ref 22–29)
DEPRECATED HCO3 PLAS-SCNC: 17 MMOL/L (ref 22–29)
DEPRECATED HCO3 PLAS-SCNC: 17 MMOL/L (ref 22–29)
DIASTOLIC BLOOD PRESSURE - MUSE: NORMAL MMHG
ERYTHROCYTE [DISTWIDTH] IN BLOOD BY AUTOMATED COUNT: 13.5 % (ref 10–15)
ERYTHROCYTE [DISTWIDTH] IN BLOOD BY AUTOMATED COUNT: 13.6 % (ref 10–15)
FETAL RBC % LFV: 0 %
FETAL RBC (ML): 0 ML
FIBRINOGEN PPP-MCNC: 609 MG/DL (ref 170–490)
GFR SERPL CREATININE-BSD FRML MDRD: 52 ML/MIN/1.73M2
GFR SERPL CREATININE-BSD FRML MDRD: 56 ML/MIN/1.73M2
GFR SERPL CREATININE-BSD FRML MDRD: 61 ML/MIN/1.73M2
GFR SERPL CREATININE-BSD FRML MDRD: 62 ML/MIN/1.73M2
GLUCOSE BLDC GLUCOMTR-MCNC: 102 MG/DL (ref 70–99)
GLUCOSE BLDC GLUCOMTR-MCNC: 104 MG/DL (ref 70–99)
GLUCOSE BLDC GLUCOMTR-MCNC: 107 MG/DL (ref 70–99)
GLUCOSE BLDC GLUCOMTR-MCNC: 109 MG/DL (ref 70–99)
GLUCOSE BLDC GLUCOMTR-MCNC: 113 MG/DL (ref 70–99)
GLUCOSE BLDC GLUCOMTR-MCNC: 114 MG/DL (ref 70–99)
GLUCOSE BLDC GLUCOMTR-MCNC: 121 MG/DL (ref 70–99)
GLUCOSE BLDC GLUCOMTR-MCNC: 123 MG/DL (ref 70–99)
GLUCOSE BLDC GLUCOMTR-MCNC: 95 MG/DL (ref 70–99)
GLUCOSE SERPL-MCNC: 104 MG/DL (ref 70–99)
GLUCOSE SERPL-MCNC: 119 MG/DL (ref 70–99)
GLUCOSE SERPL-MCNC: 123 MG/DL (ref 70–99)
GLUCOSE SERPL-MCNC: 170 MG/DL (ref 70–99)
GP B STREP DNA SPEC QL NAA+PROBE: NEGATIVE
HCT VFR BLD AUTO: 29.9 % (ref 35–47)
HCT VFR BLD AUTO: 32.2 % (ref 35–47)
HGB BLD-MCNC: 10.8 G/DL (ref 11.7–15.7)
HGB BLD-MCNC: 9.8 G/DL (ref 11.7–15.7)
HOLD SPECIMEN: NORMAL
IF INDICATED RECOMMENDED DOSE OF RH IMMUNE GLOBULIN UG: 300 UG
INR PPP: 1.06 (ref 0.85–1.15)
INTERPRETATION ECG - MUSE: NORMAL
MAGNESIUM SERPL-MCNC: 1.6 MG/DL (ref 1.7–2.3)
MAGNESIUM SERPL-MCNC: 2.3 MG/DL (ref 1.7–2.3)
MCH RBC QN AUTO: 31.2 PG (ref 26.5–33)
MCH RBC QN AUTO: 31.4 PG (ref 26.5–33)
MCHC RBC AUTO-ENTMCNC: 32.8 G/DL (ref 31.5–36.5)
MCHC RBC AUTO-ENTMCNC: 33.5 G/DL (ref 31.5–36.5)
MCV RBC AUTO: 94 FL (ref 78–100)
MCV RBC AUTO: 95 FL (ref 78–100)
P AXIS - MUSE: 46 DEGREES
PHOSPHATE SERPL-MCNC: 2.6 MG/DL (ref 2.5–4.5)
PLATELET # BLD AUTO: 256 10E3/UL (ref 150–450)
PLATELET # BLD AUTO: 260 10E3/UL (ref 150–450)
POTASSIUM SERPL-SCNC: 4.9 MMOL/L (ref 3.4–5.3)
POTASSIUM SERPL-SCNC: 5.2 MMOL/L (ref 3.4–5.3)
POTASSIUM SERPL-SCNC: 5.7 MMOL/L (ref 3.4–5.3)
PR INTERVAL - MUSE: 144 MS
PROT SERPL-MCNC: 6.8 G/DL (ref 6.4–8.3)
PROT/CREAT 24H UR: 1.65 MG/MG CR (ref 0–0.2)
QRS DURATION - MUSE: 72 MS
QT - MUSE: 376 MS
QTC - MUSE: 447 MS
R AXIS - MUSE: 11 DEGREES
RBC # BLD AUTO: 3.14 10E6/UL (ref 3.8–5.2)
RBC # BLD AUTO: 3.44 10E6/UL (ref 3.8–5.2)
SODIUM SERPL-SCNC: 133 MMOL/L (ref 136–145)
SODIUM SERPL-SCNC: 136 MMOL/L (ref 136–145)
SODIUM SERPL-SCNC: 137 MMOL/L (ref 136–145)
SODIUM SERPL-SCNC: 137 MMOL/L (ref 136–145)
SYSTOLIC BLOOD PRESSURE - MUSE: NORMAL MMHG
T AXIS - MUSE: 34 DEGREES
TACROLIMUS BLD-MCNC: 25.4 UG/L (ref 5–15)
TME LAST DOSE: 2322 H
TME LAST DOSE: ABNORMAL H
TROPONIN T SERPL HS-MCNC: 9 NG/L
VENTRICULAR RATE- MUSE: 85 BPM
WBC # BLD AUTO: 17.5 10E3/UL (ref 4–11)
WBC # BLD AUTO: 18.9 10E3/UL (ref 4–11)

## 2023-06-12 PROCEDURE — 59025 FETAL NON-STRESS TEST: CPT | Mod: 26 | Performed by: OBSTETRICS & GYNECOLOGY

## 2023-06-12 PROCEDURE — 85730 THROMBOPLASTIN TIME PARTIAL: CPT | Performed by: OBSTETRICS & GYNECOLOGY

## 2023-06-12 PROCEDURE — 36415 COLL VENOUS BLD VENIPUNCTURE: CPT | Performed by: STUDENT IN AN ORGANIZED HEALTH CARE EDUCATION/TRAINING PROGRAM

## 2023-06-12 PROCEDURE — 258N000003 HC RX IP 258 OP 636

## 2023-06-12 PROCEDURE — 83735 ASSAY OF MAGNESIUM: CPT

## 2023-06-12 PROCEDURE — 84156 ASSAY OF PROTEIN URINE: CPT

## 2023-06-12 PROCEDURE — 85384 FIBRINOGEN ACTIVITY: CPT | Performed by: OBSTETRICS & GYNECOLOGY

## 2023-06-12 PROCEDURE — 84132 ASSAY OF SERUM POTASSIUM: CPT | Performed by: STUDENT IN AN ORGANIZED HEALTH CARE EDUCATION/TRAINING PROGRAM

## 2023-06-12 PROCEDURE — 93005 ELECTROCARDIOGRAM TRACING: CPT

## 2023-06-12 PROCEDURE — 250N000011 HC RX IP 250 OP 636

## 2023-06-12 PROCEDURE — 84450 TRANSFERASE (AST) (SGOT): CPT

## 2023-06-12 PROCEDURE — 120N000002 HC R&B MED SURG/OB UMMC

## 2023-06-12 PROCEDURE — 36415 COLL VENOUS BLD VENIPUNCTURE: CPT

## 2023-06-12 PROCEDURE — 250N000011 HC RX IP 250 OP 636: Performed by: STUDENT IN AN ORGANIZED HEALTH CARE EDUCATION/TRAINING PROGRAM

## 2023-06-12 PROCEDURE — 85460 HEMOGLOBIN FETAL: CPT | Performed by: OBSTETRICS & GYNECOLOGY

## 2023-06-12 PROCEDURE — 250N000013 HC RX MED GY IP 250 OP 250 PS 637: Performed by: STUDENT IN AN ORGANIZED HEALTH CARE EDUCATION/TRAINING PROGRAM

## 2023-06-12 PROCEDURE — 84460 ALANINE AMINO (ALT) (SGPT): CPT

## 2023-06-12 PROCEDURE — 84100 ASSAY OF PHOSPHORUS: CPT

## 2023-06-12 PROCEDURE — 84132 ASSAY OF SERUM POTASSIUM: CPT

## 2023-06-12 PROCEDURE — 84484 ASSAY OF TROPONIN QUANT: CPT

## 2023-06-12 PROCEDURE — 250N000013 HC RX MED GY IP 250 OP 250 PS 637

## 2023-06-12 PROCEDURE — 85027 COMPLETE CBC AUTOMATED: CPT

## 2023-06-12 PROCEDURE — 99233 SBSQ HOSP IP/OBS HIGH 50: CPT | Mod: 25 | Performed by: OBSTETRICS & GYNECOLOGY

## 2023-06-12 PROCEDURE — 250N000009 HC RX 250

## 2023-06-12 PROCEDURE — 80197 ASSAY OF TACROLIMUS: CPT

## 2023-06-12 PROCEDURE — 85014 HEMATOCRIT: CPT | Performed by: OBSTETRICS & GYNECOLOGY

## 2023-06-12 PROCEDURE — 99231 SBSQ HOSP IP/OBS SF/LOW 25: CPT | Performed by: PHYSICIAN ASSISTANT

## 2023-06-12 PROCEDURE — 99207 PR NO CHARGE LOS: CPT | Performed by: STUDENT IN AN ORGANIZED HEALTH CARE EDUCATION/TRAINING PROGRAM

## 2023-06-12 PROCEDURE — 250N000012 HC RX MED GY IP 250 OP 636 PS 637

## 2023-06-12 PROCEDURE — 258N000003 HC RX IP 258 OP 636: Performed by: STUDENT IN AN ORGANIZED HEALTH CARE EDUCATION/TRAINING PROGRAM

## 2023-06-12 PROCEDURE — 93010 ELECTROCARDIOGRAM REPORT: CPT | Performed by: INTERNAL MEDICINE

## 2023-06-12 PROCEDURE — 250N000012 HC RX MED GY IP 250 OP 636 PS 637: Performed by: STUDENT IN AN ORGANIZED HEALTH CARE EDUCATION/TRAINING PROGRAM

## 2023-06-12 PROCEDURE — 258N000001 HC RX 258

## 2023-06-12 PROCEDURE — 85610 PROTHROMBIN TIME: CPT | Performed by: OBSTETRICS & GYNECOLOGY

## 2023-06-12 RX ORDER — CALCIUM GLUCONATE 94 MG/ML
1 INJECTION, SOLUTION INTRAVENOUS
Status: DISCONTINUED | OUTPATIENT
Start: 2023-06-12 | End: 2023-06-16 | Stop reason: HOSPADM

## 2023-06-12 RX ORDER — MAGNESIUM SULFATE HEPTAHYDRATE 40 MG/ML
4 INJECTION, SOLUTION INTRAVENOUS ONCE
Status: COMPLETED | OUTPATIENT
Start: 2023-06-12 | End: 2023-06-12

## 2023-06-12 RX ORDER — NIFEDIPINE 10 MG/1
20 CAPSULE ORAL EVERY 6 HOURS
Status: DISCONTINUED | OUTPATIENT
Start: 2023-06-12 | End: 2023-06-12

## 2023-06-12 RX ORDER — NIFEDIPINE 10 MG/1
20 CAPSULE ORAL ONCE
Status: COMPLETED | OUTPATIENT
Start: 2023-06-12 | End: 2023-06-12

## 2023-06-12 RX ORDER — NIFEDIPINE 10 MG/1
20 CAPSULE ORAL EVERY 30 MIN PRN
Status: DISCONTINUED | OUTPATIENT
Start: 2023-06-12 | End: 2023-06-12

## 2023-06-12 RX ORDER — DEXTROSE MONOHYDRATE 25 G/50ML
25 INJECTION, SOLUTION INTRAVENOUS ONCE
Status: COMPLETED | OUTPATIENT
Start: 2023-06-12 | End: 2023-06-12

## 2023-06-12 RX ORDER — DEXTROSE MONOHYDRATE 25 G/50ML
25-50 INJECTION, SOLUTION INTRAVENOUS
Status: DISCONTINUED | OUTPATIENT
Start: 2023-06-12 | End: 2023-06-12

## 2023-06-12 RX ORDER — CHOLECALCIFEROL (VITAMIN D3) 125 MCG
6000 CAPSULE ORAL 3 TIMES DAILY PRN
Status: DISCONTINUED | OUTPATIENT
Start: 2023-06-12 | End: 2023-06-16 | Stop reason: HOSPADM

## 2023-06-12 RX ORDER — NICOTINE POLACRILEX 4 MG
15-30 LOZENGE BUCCAL
Status: DISCONTINUED | OUTPATIENT
Start: 2023-06-12 | End: 2023-06-12

## 2023-06-12 RX ORDER — TACROLIMUS 5 MG/1
5 CAPSULE ORAL 2 TIMES DAILY
Status: DISCONTINUED | OUTPATIENT
Start: 2023-06-12 | End: 2023-06-16 | Stop reason: HOSPADM

## 2023-06-12 RX ORDER — TACROLIMUS 1 MG/1
2 CAPSULE ORAL 2 TIMES DAILY
Status: DISCONTINUED | OUTPATIENT
Start: 2023-06-12 | End: 2023-06-16 | Stop reason: HOSPADM

## 2023-06-12 RX ORDER — DEXTROSE MONOHYDRATE 25 G/50ML
INJECTION, SOLUTION INTRAVENOUS
Status: DISCONTINUED
Start: 2023-06-12 | End: 2023-06-12 | Stop reason: WASHOUT

## 2023-06-12 RX ORDER — ACETAMINOPHEN 325 MG/1
650 TABLET ORAL EVERY 4 HOURS PRN
Status: DISCONTINUED | OUTPATIENT
Start: 2023-06-12 | End: 2023-06-16 | Stop reason: HOSPADM

## 2023-06-12 RX ADMIN — TACROLIMUS 5 MG: 5 CAPSULE ORAL at 21:59

## 2023-06-12 RX ADMIN — TACROLIMUS 5 MG: 5 CAPSULE ORAL at 10:08

## 2023-06-12 RX ADMIN — SODIUM BICARBONATE 650 MG TABLET 650 MG: at 08:55

## 2023-06-12 RX ADMIN — LACTASE TAB 3000 UNIT 6000 UNITS: 3000 TAB at 15:46

## 2023-06-12 RX ADMIN — ACETAMINOPHEN 650 MG: 325 TABLET, FILM COATED ORAL at 10:05

## 2023-06-12 RX ADMIN — SODIUM CHLORIDE: 9 INJECTION, SOLUTION INTRAVENOUS at 14:07

## 2023-06-12 RX ADMIN — ACETAMINOPHEN 650 MG: 325 TABLET, FILM COATED ORAL at 21:58

## 2023-06-12 RX ADMIN — DEXTROSE 50 % IN WATER (D50W) INTRAVENOUS SYRINGE 25 G: at 06:42

## 2023-06-12 RX ADMIN — SODIUM CHLORIDE: 9 INJECTION, SOLUTION INTRAVENOUS at 10:12

## 2023-06-12 RX ADMIN — CALCITRIOL CAPSULES 0.25 MCG 0.25 MCG: 0.25 CAPSULE ORAL at 13:07

## 2023-06-12 RX ADMIN — MAGNESIUM SULFATE HEPTAHYDRATE 4 G: 40 INJECTION, SOLUTION INTRAVENOUS at 02:54

## 2023-06-12 RX ADMIN — CALCIUM CARBONATE 1250 MG: 1250 SUSPENSION ORAL at 19:04

## 2023-06-12 RX ADMIN — MAGNESIUM OXIDE TAB 400 MG (241.3 MG ELEMENTAL MG) 400 MG: 400 (241.3 MG) TAB at 20:16

## 2023-06-12 RX ADMIN — NIFEDIPINE 20 MG: 10 CAPSULE ORAL at 08:54

## 2023-06-12 RX ADMIN — AZATHIOPRINE 150 MG: 50 TABLET ORAL at 20:16

## 2023-06-12 RX ADMIN — SODIUM BICARBONATE 650 MG TABLET 650 MG: at 20:16

## 2023-06-12 RX ADMIN — Medication 37.5 MCG: at 08:55

## 2023-06-12 RX ADMIN — TACROLIMUS 2 MG: 1 CAPSULE ORAL at 22:00

## 2023-06-12 RX ADMIN — CALCIUM CARBONATE 1250 MG: 1250 SUSPENSION ORAL at 14:28

## 2023-06-12 RX ADMIN — TACROLIMUS 2 MG: 1 CAPSULE ORAL at 10:06

## 2023-06-12 RX ADMIN — MAGNESIUM OXIDE TAB 400 MG (241.3 MG ELEMENTAL MG) 400 MG: 400 (241.3 MG) TAB at 08:55

## 2023-06-12 RX ADMIN — BETAMETHASONE SODIUM PHOSPHATE AND BETAMETHASONE ACETATE 12 MG: 3; 3 INJECTION, SUSPENSION INTRA-ARTICULAR; INTRALESIONAL; INTRAMUSCULAR at 20:17

## 2023-06-12 RX ADMIN — NIFEDIPINE 20 MG: 10 CAPSULE ORAL at 03:35

## 2023-06-12 RX ADMIN — CYANOCOBALAMIN TAB 1000 MCG 1000 MCG: 1000 TAB at 08:55

## 2023-06-12 RX ADMIN — SODIUM BICARBONATE: 84 INJECTION, SOLUTION INTRAVENOUS at 21:06

## 2023-06-12 RX ADMIN — HUMAN INSULIN 5 UNITS: 100 INJECTION, SOLUTION SUBCUTANEOUS at 06:48

## 2023-06-12 RX ADMIN — ACETAMINOPHEN 650 MG: 325 TABLET, FILM COATED ORAL at 06:04

## 2023-06-12 RX ADMIN — SODIUM ZIRCONIUM CYCLOSILICATE 10 G: 5 POWDER, FOR SUSPENSION ORAL at 05:09

## 2023-06-12 RX ADMIN — SODIUM CHLORIDE 4 MILLION UNITS: 9 INJECTION, SOLUTION INTRAVENOUS at 03:43

## 2023-06-12 RX ADMIN — SODIUM CHLORIDE: 9 INJECTION, SOLUTION INTRAVENOUS at 03:03

## 2023-06-12 RX ADMIN — Medication 2.5 MILLION UNITS: at 07:40

## 2023-06-12 RX ADMIN — PRENATAL VITAMINS-IRON FUMARATE 27 MG IRON-FOLIC ACID 0.8 MG TABLET 1 TABLET: at 20:16

## 2023-06-12 ASSESSMENT — ACTIVITIES OF DAILY LIVING (ADL)
ADLS_ACUITY_SCORE: 35
ADLS_ACUITY_SCORE: 24
ADLS_ACUITY_SCORE: 35
ADLS_ACUITY_SCORE: 39
ADLS_ACUITY_SCORE: 24
ADLS_ACUITY_SCORE: 35
ADLS_ACUITY_SCORE: 24
ADLS_ACUITY_SCORE: 35

## 2023-06-12 NOTE — PROGRESS NOTES
Interval MD Provider Note     Late entry secondary to patient care. To patient's room to discuss discussion of plan with Nephrology, suspected etiology of post-obstructive ATN and profound diuresis as cause of hyperkalemia. Repeat K now normalized. Patient now feeling some tightening of her abdomen, would like repeat SVE. Performed at this time, and remains C/L/H over 2h. Given persistence of contractions and positive FFN, suggested course of BMZ. Encouraged the patient to reach out to us if she noted more discomfort with contractions. If this were to occur, would have low threshold to administer IV Mg and PCN therapy for GBS unknown. Currently NPO given developing clinical scenario, consider Regular diet if contractions space and FHR remains reactive and reassuring for gestational age.     Above discussed with Dr. Neil.     Tasha Kohli MD   OB/GYN PGY-3  06/11/23 8:32 PM

## 2023-06-12 NOTE — PROGRESS NOTES
Reviewed FHT     Baseline 140, moderate variability, absent accels, no current decels  No current ctx    - Previously was having recurrent variable decelerations with contractions. Received PO nifedipine for tocolysis at 0854 and currently receiving 500mL fluid bolus.     FHT improved with resolution of contractions.   Continue fetal monitoring.     Tammie Johnson MD

## 2023-06-12 NOTE — PROGRESS NOTES
Antepartum Progress Note    Delayed entry due to patient care. Patient seen multiple times between 0330 and 0530 AM.    Notified by RN of multiple ongoing issues with patient. Just after magnesium bolus started, patient developed acute onset of chest discomfort and possible palpitations. Patient hypertensive with HR in low 100s, sating 97-99% on room air. RRR. No SOB. Magnesium stopped, but chest discomfort continued. STAT EKG obtained, reviewed with nephrology who was not concerned regarding hyperkalemia contributing. STAT troponins were negative. Patient was seen by ICU team, and at that point in time, chest pain symptomatically improved. Her BP and HR both decreased without intervention. Suspect patient's symptoms secondary to magnesium side effects. Will obtain mag level at 0700.    Patient continued to be hyperkalemic to 5.6 then 5.7. Patient also endorsing diffuse muscle aches that feel similar to when she had hyperkalemia in the past. Discussed persistent hyperkalemia with nephrology. Continuing lokelma, and initially recommended additional fluid bolus and increasing NS to 150 mL/hr. After potassium remained elevated, recommended shifting potassium with 5U regular insulin w/ 25g D50. Will follow BG and potassium closely. Per nephrology, will still hold on telemetry unless potassium > 5.9.    During patient's chest pain episode, she was hypertensive with non-sustained severe range BP. No symptoms of pre-eclampsia w/ SF. HELLP labs obtained and wnl. UPC elevated, but consistent with PNT in place. Nephrology's BP goal for patient <130/80, but given acute situation, nephrology does not recommend treatment of her hypertension at this time. S/p 4g magnesium bolus as above. Will continue to watch patient's BP closely, but low concern for pre-eclampsia at this time.    After patient's chest pain resolved, she reported noticing stronger contractions again as well as some wetness in her underwear. Not sure if she has  actually felt leaking. Speculum exam performed with negative pooling, no leaking with valsalva. Ferning collected, but low suspicion for ROM. Repeat SVE unchanged at 1/50/H. Continue BMZ, nifedipine, and PCN for  labor management. Will consider repeating magnesium prn, repeat mag level at 0700.    Plan of care discussed with Dr. Neil.    Delicia Moreira MD  OB/GYN Resident, PGY-3

## 2023-06-12 NOTE — PROVIDER NOTIFICATION
06/12/23 0947   Provider Notification   Provider Name/Title Dr. Trinh Lr   Method of Notification Phone   Request Evaluate - Remote   Notification Reason Decels;Variability Change     Provider notified d/t decels, pt repositioned, provider on [facility], RN to page Dr. Johnson to review EFM monitor tracing.

## 2023-06-12 NOTE — CONSULTS
[facility]                Neonatology Antepartum Counseling Consult:  I was asked to provide antepartum counseling for Mona Wagner at the request of Dr. Delicia Neil MD secondary to PTL and  cervical dilation. Ms. Mona Wagner is currently 25 weeks/3 days gestation and has a history significant for:    Patient Active Problem List   Diagnosis     DVT of upper extremity (deep vein thrombosis) (H)     Seizure disorder (H)     Acquired hypothyroidism     Increased prolactin level     Aftercare following organ transplant     Immunosuppression (H)     Kidney replaced by transplant     Anxiety     Vitamin D deficiency     CKD (chronic kidney disease) stage 3, GFR 30-59 ml/min (H)     Bicornate uterus     Orthostatic hypotension     Tertiary hyperparathyroidism (H)     High-risk pregnancy in second trimester     Dehydration     Stage 3a chronic kidney disease (H)     Anemia of chronic renal failure     Elevated blood pressure affecting pregnancy in second trimester, antepartum      contractions      Betamethasone was administered on  at 20:00. If still pregnant, second dose due  at 19:30.     Ms. Mona Wagner, accompanied by her sister-in-law, was counseled on the expected hospital course, potential risks, and outcomes associated with an infant born at this gestation. The counseling included: morbidity, mortality, and initial delivery room stabilization. I also explained the basic four criteria for discharge: that the baby had to be free of apnea; able to maintain their body temperature; able to feed by bottle or breast well enough to; attain an adequate pattern of weight gain and growth.    At this point in our conversation, mother expressed that she was feeling very uncomfortable from her magnesium infusion and finding it difficult to concentrate on our discussion. Plus, she stated she would like the baby's father to be present for the consult. We discussed  that we could tentatively plan to resume the consult on  after 17:00 when baby's father will be at the hospital; however, if Mona's labor is to progress and delivery is imminent, NICU will return earlier and continue the consult with father on speaker phone. Mona is in agreement with this plan.     Not discussed during this portion of the consult: respiratory course, lung development, patent ductus arteriosus, retinopathy of prematurity, hyperbilirubinemia, hemodynamic support, infection (including NEC), intraventricular hemorrhage, nutrition, growth and development, and long term outcomes    The patient had no questions at this time. Will further discuss questions upon continuation of the consult. Please feel free to call and thank you for involving the NICU team in the care of your patient.      Floor Time (min): 5  Face to Face Time (min): 15  Total Time (minutes): 20  More than 50% of my time was spent in direct, face to face, antepartum counseling with the above patient.    Lilo Avery PA-C 2023 3:10 AM  [facility]   Advanced Practice Providers

## 2023-06-12 NOTE — DISCHARGE SUMMARY
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 ()  Renal transplant  Secondary renal hyperparathyroidism  FATOUMATA  Hypothyroidism  Hx of DVT  Hx of bacterial endocarditis  Bicornuate uterus   Chronic hypertension    Discharge Diagnosis:   25w2d   Contractions, resolved  Hyperkalemia, resolved    Post-obstructive ATN  Hypomagnesemia, resolved   Hx IgA nephropathy, 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 ()  Renal transplant  Secondary renal hyperparathyroidism  FATOUMATA  Hypothyroidism  Hx of DVT  Hx of bacterial endocarditis  Bicornuate uterus   Chronic hypertension  Post obstructive dieresis, improved   Itching on back of hands, intermittent     Procedures:  None    Consultations:  Transplant Nephrology, NICU    Medications prior to admission:  Medications Prior to Admission   Medication Sig Dispense Refill Last Dose     acetaminophen (TYLENOL) 325 MG tablet Take 2 tablets (650 mg) by mouth every 4 hours as needed for mild pain 100 tablet 3      aspirin (ASA) 81 MG chewable tablet Take 1 tablet (81 mg) by mouth daily 90 tablet 3      azaTHIOprine (IMURAN) 50 MG tablet Take 3 tablets (150 mg) by mouth daily (Patient taking differently: Take 150 mg by mouth every evening) 270 tablet 3      calcitRIOL (ROCALTROL) 0.25 MCG capsule Take 1 capsule (0.25 mcg) by mouth daily 28 capsule 0      calcium carbonate 1250 MG/5ML SUSP suspension Take 5 mLs by mouth 2 times daily (with meals) Take 7.5 ml twice daily        cyanocobalamin (VITAMIN B-12) 1000 MCG tablet Take 1 tablet (1,000 mcg) by mouth  daily 90 tablet 1      famotidine (PEPCID) 20 MG tablet Take 1 tablet (20 mg) by mouth 2 times daily 180 tablet 3      polyethylene glycol (MIRALAX) 17 GM/Dose powder Take 17 g (1 capful) by mouth daily as needed for constipation 507 g 3      Prenatal Vit-Fe Fumarate-FA (PRENATAL MULTIVITAMIN W/IRON) 27-0.8 MG tablet Take 1 tablet by mouth daily (Patient taking differently: Take 1 tablet by mouth every evening) 90 tablet 3      simethicone (MYLICON) 125 MG chewable tablet Take 1 tablet (125 mg) by mouth 4 times daily as needed for intestinal gas 120 tablet 3      sodium bicarbonate 650 MG tablet Take 1 tablet (650 mg) by mouth 2 times daily 180 tablet 3      tacrolimus (GENERIC EQUIVALENT) 1 MG capsule Take 2 capsules (2 mg) by mouth 2 times daily Total dose = 7 mg twice daily 360 capsule 3      tacrolimus (GENERIC EQUIVALENT) 5 MG capsule Take 1 capsule (5 mg) by mouth 2 times daily Total dose = 7 mg every AM and 7 mg every PM 60 capsule 11      [DISCONTINUED] levothyroxine (SYNTHROID/LEVOTHROID) 25 MCG tablet TAKE 1.5 tablet daily 135 tablet 3      [DISCONTINUED] magnesium oxide (MAG-OX) 400 MG tablet Take 1 tablet (400 mg) by mouth 2 times daily 60 tablet 11      [DISCONTINUED] oxyCODONE (ROXICODONE) 5 MG tablet Take 1 tablet (5 mg) by mouth every 6 hours as needed for pain 6 tablet 0        Brief History of Presentation:    Mona Wagner is a 32 year old  at 25w2d by LMP c/w 8w4d US who presented from [facility] clinic on  for further evaluation of two severe range BP without symptoms. Patient was admitted for observation to r/o preeclampsia with severe features. BP changes were attributed to worsening allograft hydronephroses and a PNT was placed during this admission. Procedure was uncomplicated and pt was discharge in stable condition. Patient then returned c/o increased vaginal discharge with associated cramping abdominal pain on . She initially attributed this pain to PNT site. Patient noted also  experiencing vaginal irritation and burning sensation. Patient received IV Flagyl for PNT placement. Denied overt LOF and bleeding. Endorsed baseline fetal movement.      Patient has a significant and complex medical history notable for renal transplant in 2019 secondary to IgA nephropathy and allograft hydronephrosis d/t ureteral stenosis in addition to recent parathyroidectomy now s/p R PNT placement, done 6/8/23. Patient has a history of chronic hypertension prior to renal transplant.     Hospital Course:    Patient found to be hyperkalemic on admission. Thought to be due to post-obstructive ATN and profound diuresis. PO IVF hydration was administered and infusion was subsequently started. Repeat potassium was wnl. EKG done and NSR. As patient was being admitted, she began experiencing tightness in her abdomen. SVE was performed and found 1/50/H. Wet prep, GC/CT was negative. GBS status was unknown. Given persistence of contractions and positive FFN, nifedipine, BMZ, IV Mg and IV PCN were started. Cervix remained unchanged on repeat exams during the course of her hospitalization. Steroid course was completed (6/11-6/12) and IV Mg and IV PCN were stopped. Patient continued to experience painless abdominal tightening as well as new general muscle cramping and some tingling in both hands. Potassium level was found to be elevated again and lokelma was started per Nephrology.     In the early AM on the 6/12, patient complained of new chest discomfort but no shortness of breath. Magnesium was stopped but chest discomfort continued. EKG was ordered and showed tachycardia but was otherwise unremarkable. Troponins were negative. Patient was seen by ICU team but by then chest discomfort had improved. BP and HR both decreased without intervention. Patient continued to be hyperkalemic to 5.6 then 5.7. Patient also endorsed diffuse muscle aches that felt similar to when she had hyperkalemia in the past. Nephrology recommended  continuing lokelma and additional fluid bolus and increasing NS to 150 mL/hr. After potassium remained elevated, Nephrology recommended shifting potassium with 5U regular insulin w/ 25g D50. Telemetry held due to potassium < 5.9. Potassium continued to be mildly elevated, so Nephrology recommended sodium bicarb in 1 L of D5W. Potassium subsequently wnl. Tacrolimus level was also taken and found to be 25.4, however, Tacro level was drawn only 3 hours after receiving Tacrolimus. Repeat Tacro level was 9.7. Another repeat level was taken and tacro was found to be borderline therapeutic at 6.2.     Patient's IOs on 6/15 were net positive without IVF boluses. Magnesium was mildly low and patient subsequently received magnesium. On 6/16 PNT output had significantly decreased, patient had not required IVF in the past 24 hours and patient's IOs were reassuring. Bile acids level was collected due to intermittent itching patient experienced in her hands, though the itching was on back of hands and intermittently improved with lotion. This lab value was pending at the time of discharge. Plan is to f/u on results in MFM clinic. Per Nephrology will increase magnesium oxide to 800 mg PO BID while inpatient and patient will continue at this higher dose. She will follow up with Nephrology in the week after discharge. She was also seen by endocrine during her time in the hospitalization and they increased her thyroid medication. She has plans to follow up with endocrine in the month after discharge.     Patient was stable for discharge to home.     Discharge Instructions:  Call or present to labor and delivery if you experience:   -Regular painful contractions concerning for labor   -Leakage of fluid concerning for ruptured membranes   -Decreased fetal movement   -Bright red vaginal bleeding    -Headache, vision changes, upper abdominal pain, significant increase in swelling,   generalized unwell feeling    Follow up:  Follow up in  [facility] clinic on 6/19/21  Follow up with Transplant Nephrology within one week  Follow up with Endocrinology within 1 month of discharge     Discharge Medications:     Review of your medicines      CONTINUE these medicines which may have CHANGED, or have new prescriptions. If we are uncertain of the size of tablets/capsules you have at home, strength may be listed as something that might have changed.      Dose / Directions   azaTHIOprine 50 MG tablet  Commonly known as: IMURAN  This may have changed: when to take this  Used for: Kidney replaced by transplant      Dose: 150 mg  Take 3 tablets (150 mg) by mouth daily  Quantity: 270 tablet  Refills: 3     levothyroxine 50 MCG tablet  Commonly known as: SYNTHROID/LEVOTHROID  This may have changed:     medication strength    how much to take    how to take this    when to take this    additional instructions  Used for: Acquired hypothyroidism      Dose: 50 mcg  Take 1 tablet (50 mcg) by mouth daily  Quantity: 90 tablet  Refills: 1     magnesium oxide 400 MG tablet  Commonly known as: MAG-OX  This may have changed: how much to take  Used for: Stage 3a chronic kidney disease (H)      Dose: 800 mg  Take 2 tablets (800 mg) by mouth 2 times daily  Quantity: 90 tablet  Refills: 1     prenatal multivitamin w/iron 27-0.8 MG tablet  This may have changed: when to take this  Used for: Pre-conception counseling      Dose: 1 tablet  Take 1 tablet by mouth daily  Quantity: 90 tablet  Refills: 3        CONTINUE these medicines which have NOT CHANGED      Dose / Directions   acetaminophen 325 MG tablet  Commonly known as: TYLENOL  Used for: Back pain, unspecified back location, unspecified back pain laterality, unspecified chronicity      Dose: 650 mg  Take 2 tablets (650 mg) by mouth every 4 hours as needed for mild pain  Quantity: 100 tablet  Refills: 3     aspirin 81 MG chewable tablet  Commonly known as: ASA  Used for: Kidney transplanted      Dose: 81 mg  Take 1 tablet (81 mg) by  mouth daily  Quantity: 90 tablet  Refills: 3     calcitRIOL 0.25 MCG capsule  Commonly known as: ROCALTROL  Used for: Kidney replaced by transplant, Aftercare following organ transplant      Dose: 0.25 mcg  Take 1 capsule (0.25 mcg) by mouth daily  Quantity: 28 capsule  Refills: 0     calcium carbonate 1250 MG/5ML Susp suspension      Dose: 5 mL  Take 5 mLs by mouth 2 times daily (with meals) Take 7.5 ml twice daily  Refills: 0     cyanocobalamin 1000 MCG tablet  Commonly known as: VITAMIN B-12  Used for: Anemia during pregnancy in second trimester      Dose: 1,000 mcg  Take 1 tablet (1,000 mcg) by mouth daily  Quantity: 90 tablet  Refills: 1     famotidine 20 MG tablet  Commonly known as: PEPCID  Used for: Heartburn      Dose: 20 mg  Take 1 tablet (20 mg) by mouth 2 times daily  Quantity: 180 tablet  Refills: 3     polyethylene glycol 17 GM/Dose powder  Commonly known as: MIRALAX  Used for: Heartburn, Slow transit constipation      Dose: 1 capful.  Take 17 g (1 capful) by mouth daily as needed for constipation  Quantity: 507 g  Refills: 3     simethicone 125 MG chewable tablet  Commonly known as: MYLICON  Used for: Bloated abdomen      Dose: 125 mg  Take 1 tablet (125 mg) by mouth 4 times daily as needed for intestinal gas  Quantity: 120 tablet  Refills: 3     sodium bicarbonate 650 MG tablet  Used for: High-risk pregnancy in second trimester, Kidney transplanted      Dose: 650 mg  Take 1 tablet (650 mg) by mouth 2 times daily  Quantity: 180 tablet  Refills: 3     * tacrolimus 1 MG capsule  Commonly known as: GENERIC EQUIVALENT  Used for: Kidney replaced by transplant      Dose: 2 mg  Take 2 capsules (2 mg) by mouth 2 times daily Total dose = 7 mg twice daily  Quantity: 360 capsule  Refills: 3     * tacrolimus 5 MG capsule  Commonly known as: GENERIC EQUIVALENT  Used for: Kidney replaced by transplant      Dose: 5 mg  Take 1 capsule (5 mg) by mouth 2 times daily Total dose = 7 mg every AM and 7 mg every  PM  Quantity: 60 capsule  Refills: 11         * This list has 2 medication(s) that are the same as other medications prescribed for you. Read the directions carefully, and ask your doctor or other care provider to review them with you.            STOP taking    oxyCODONE 5 MG tablet  Commonly known as: ROXICODONE              Where to get your medicines      These medications were sent to [place], [place] - [address]  [address] [address]    Phone: [phone]     levothyroxine 50 MCG tablet    magnesium oxide 400 MG tablet       Estefani Melo, MS4  ED Camp MD  Specialist in Maternal-Fetal Medicine

## 2023-06-12 NOTE — PROGRESS NOTES
Brief Progress Note    Went to evaluate patient given ongoing contractions. Per patient, mostly still painless tightening's but occasionally 1/10 in intensity. Also endorsing general muscle cramping and some tingling in both hands which is new.    /75 (BP Location: Right arm, Patient Position: Left side, Cuff Size: Adult Regular)   Temp 98.1  F (36.7  C) (Oral)   Resp 20   LMP 2022     SVE: 1/50/H, soft    FHT: baseline 140, moderate variability, + accels, intermittent variable decels  [place]: 3 q 10 min    Discussed cervical change and concern for  labor at this time. S/p BMZ x1, will start magnesium, PCN, and nifedipine for  labor management. Patient also with ongoing hyperkalemia at last check. Discussed findings with nephrology, ok for  labor medications and would recommend Lokelma for hyperkalemia. Given patient with generalized muscle pain and tingling, will obtain STAT BMP, mag, and phos.     # labor  - s/p BMZ x1  - Mag 4g bolus without maintenance given Cr, will obtain mag level at 0700  - PCN w/ 25% dose reduction given GBS unknown  - Nifedipine for tocolysis  - NICU consult  - Cephalic by BSUS on admission, rescan prn    #Hyperkalemia  - STAT repeat BMP, mag, phos  - Lokelma started per nephrology  - No indication for shifting or telemetry at this time    Plan of care discussed with Dr. Neil.    Delicia Moreira MD  OB/GYN Resident, PGY-3

## 2023-06-12 NOTE — H&P
Maternal Fetal Medicine History and Physical     Mona Wagner MRN# [id number]   YOB: 1990 Age: 32 year old      Date of Admission: 23     Primary care provider: Macarena Bowen      Assessment and Plan:     Mona Wagner is a 32 year old  at 25w2d by LMP c/w 8w4d US who is here for evaluation of increased vaginal discharge and cramping contractions. She was found to have significant hyperkalemia - HD#1 for rule out  labor, as well as hyperkalemia with suspected post-tubular ATN.     Patient has a complex medical history notable for renal transplant in 2019 secondary to IgA nephropathy and allograft hydronephrosis d/t ureteral stenosis - now POD#2 s/p R PNT placement. Also notable history of recent parathyroidectomy. Of note, recently admitted -23 for rule out severe preeclampsia after presenting with elevated blood pressures in clinic - BP changes attributed to worsening allograft hydronephrosis - she received the PNT during that admission to aid in renal function before discharging home in stable clinical condition.    Hx renal transplant 2019  Allograft hydronephrosis d/t ureteral stenosis  IgA nephropathy   POD#2 s/p Right PNT Placement   Hyperkalemia  Hypomagnesemia    Suspected Post-ATN Diuresis   Followed by Dr. Mcintyre in Transplant Nephrology. Discussed findings with Nephrology service this evening - possible that hyperkalemia simply function of profound diuresis and subsequent hemoconcentration. Plan to repeat potassium after aggressive PO IVF hydration in triage, administration of 1L NS, and continuance of 125cc/h NS mIVF. Will repeat potassium and sodium regularly thereafter. Nephrology aiming to further evaluate the allograft with Renal US while inpatient. EKG performed and NSR. Per Nephrology, patient will not meet criteria for telemetry monitoring (I.e. ICU transfer) unless K 5.9 or above. If K~5.5, can consider Lokelma or other chelating agent. Admission to  Antepartum in the interim with close monitoring as below.   - Initiate 1L NS bolus, followed by mIVF 125cc/h NS   - Strict I&Os while inpatient   - Perform K level now > Q4h K and Na values. If persistently hyperkalemic, consider insulin (e.g. 5u) and 25mL of D50 to counteract hyperkalemia. If K ~5.5, consider Lokelma. If K > 5.9, plan telemetry and transfer to MICU.   - Replace Mg electrolyte per protocol   - Continue PTA Tacrolimus and Azathioprine (Tacrolimus dose recently increased to 7mg BID)  - Continue PTA Bicarbonate, Mg; ASA held   - Weekly tacrolimus level, last    - Baseline Cr 0.8-1.1; Cr on admission 1.57; mild proteinuria UPC ratio 0.08.  - Close management of blood pressure with goal of <130/90     Increased Vaginal Discharge, Contractions   Rule out  Labor   SVE upon admission reassuring. Plan to administer bolus of IVF and re-evaluate SVE based on symptoms. Wet prep negative. GC/CT pending. FFN positive. Given high risk of  labor in the patient with complex medical situations, low threshold to administer BMZ course, IV Mg, and IV PCN for GBS unknown status.   - Follow up repeat SVE s/p IVF if patient remains symptomatic   - Cephalic by BSUS - consider regular diet after repeat SVE   - Continuous fetal monitoring at this time, consider spacing if clinical status improves   - If patient were to transfer to MICU, would require continues fetal monitoring capabilities at that time.   - NICU consultation prn     Chronic vs Gestational Hypertension   Per chart review, patient reports a history of hypertension in the past when on dialysis prior to her renal transplant, but this resolved after transplant. Was admitted to Antepartum -23 for elevations in her blood pressure in clinic with concern for Superimposed PreE - serial labs noted to be reassuring.   - Serial BP monitoring   - IV Antihypertensives prn for sustained severe range blood pressures (>160/>110)  - Cr 1.57 at admission  (peak of 1.6 as below), o/w wnl  - HELLP labs prn - normotensive at admission     Hypothyroidism  Secondary Hyperparathyroidism with hypercalcemia s/p parathyroidectomy  - S/p resection of parathyroid glands on 23  - Follows with endo. Continue current levothyroxine prescription 37.5 mcg daily.  - TSH 2.28 on 23, free T4 1.0 and free T3 2.2  - Per endo recheck labs monthly      Anemia of Chronic Disease  - On erythropoietin stimulating agents  - Last Hgb 9.9    Hx Provoked DVT in   - Assessment for inherited thrombophilias including Factor V Leiden and Prothrombin Gene Mutation: Negative.  - Additional hypercoagulability work up negative.  - Not on prophylactic anticoagulation in line with ACOG recommendations.    Hx of bacterial endocarditis:  - Echo on 3/20, EF 70%    FATOUMATA  - Continue CPAP    Routine PNC  - Rh +, Rubella and Varicella non-immune, HepB/HIV/RPR NR, GCT passed, Hgb 8.5, Plts 188  - Other prenatal labs wnl  - Imaging: placenta anterior, comp US  EFW 15%, AC 20% (662g)  - Contraception: undecided   - Feeding: formula     Fetal Well Being   NST appropriate for gestational age; Cephalic by BSUS today   - Continuous fetal monitoring while assessing for  labor   - Serial growth US q4 after anatomy scan  - Weekly BPPs at 32 weeks, may need to be moved up to 28 weeks   - Intrauterine resuscitative measures prn    Patient discussed with Dr. Neil.     Tasha Kohli MD   OB/GYN PGY-3  23 1900             HPI:     Mona Wagner is a 32 year old  at 25w2d by LMP c/w 8w4d US who presents with increased vaginal discharge. Noted onset of this today with associated cramping abdominal pain which she initially attributed to PNT site. Also describes vaginal irritation and burning sensation - received IV Flagyl for PNT placement, concerned she may have developed a yeast infection from the antibiotic therapy. No vaginal bleeding. States discharge leaked out on one occasion, but no overt  leaking of fluids. Baseline fetal movement.     OB History:    OB History    Para Term  AB Living   1 0 0 0 0 0   SAB IAB Ectopic Multiple Live Births   0 0 0 0 0      # Outcome Date GA Lbr Robinson/2nd Weight Sex Delivery Anes PTL Lv   1 Current                 Prenatal Lab Results:  ABO - O   Rh - pos  Ab - neg  Rubella - non-immune  Varicella - non-immune  HepB/HIV/RPR - NR               Past Medical History:     Past Medical History:   Diagnosis Date     Anemia in chronic kidney disease      History of bacterial endocarditis      History of blood transfusion      History of DVT (deep vein thrombosis)      History of seizure      History of subarachnoid hemorrhage      Hydronephrosis      Hypothyroidism      Kidney transplanted 2019    DCD DDKT. Induction with thymo 6mg/kg.     Orthostatic hypotension      FATOUMATA (obstructive sleep apnea)      Pyelonephritis of transplanted kidney 2020     Secondary renal hyperparathyroidism (H)      Urinary tract infection    No h/o asthma.           Past Surgical History:     Past Surgical History:   Procedure Laterality Date     BENCH KIDNEY N/A 8/3/2019    Procedure: BACKBENCH PREPARATION, ALLOGRAFT, KIDNEY;  Surgeon: Dorian Johnson MD;  Location: [facility]     COMBINED CYSTOSCOPY, RETROGRADES, EXCHANGE STENT URETER(S) Right 2020    Procedure: CYSTOSCOPY, CYSOTGRAM, WITH RETROGRADE PYELOGRAM, ureteroscopy,  AND URETERAL STENT;  Surgeon: Wally Britt MD;  Location: [facility] OR     COMBINED CYSTOSCOPY, RETROGRADES, URETEROSCOPY, LASER HOLMIUM LITHOTRIPSY URETER(S), INSERT STENT N/A 2019    Procedure: CYSTOURETEROSCOPY, WITH RETROGRADE PYELOGRAM of transplant kidney, STENT INSERTION, Laser on standby;  Surgeon: Wally Britt MD;  Location: [facility]     CREATE FISTULA ARTERIOVENOUS UPPER EXTREMITY  2014    Procedure: CREATE FISTULA ARTERIOVENOUS UPPER EXTREMITY;  Left Wrist Arteriovenous Fistula Placement;  Surgeon: Shashi Castro  MD Yusra;  Location: [facility] OR     CREATE FISTULA ARTERIOVENOUS UPPER EXTREMITY       CYSTOSCOPY, RETROGRADES, INSERT STENT URETER(S), COMBINED N/A 2020    Procedure: CYSTOSCOPY, WITH RETROGRADE PYELOGRAM, CYSTOGRAM AND URETERAL STENT INSERTION - TRANSPLANT KIDNEY;  Surgeon: Wally Britt MD;  Location: [facility]     IR CEREBRAL ANGIOGRAM  2014     IR NEPHROSTOMY TUBE PLACEMENT RIGHT  2023     PARATHYROIDECTOMY Bilateral 2023    Procedure: Bilateral Resection of 3 and 1/2 parathyroid glands;  Surgeon: Melissa Jones MD;  Location: [facility]     PERCUTANEOUS BIOPSY KIDNEY Right 2019    Procedure: Right Kidney Biopsy;  Surgeon: Deny Rios MD;  [facility]: [facility]     RASTA/DIALYSIS CATHETER  12/10/2013          TRANSPLANT KIDNEY RECIPIENT  DONOR N/A 8/3/2019    Procedure: TRANSPLANT, KIDNEY, RECIPIENT,  DONOR with Ureteral Stent Placement;  Surgeon: Dorian Johnson MD;  Location: [facility] OR             Social History:     Social History     Tobacco Use     Smoking status: Never     Smokeless tobacco: Never   Vaping Use     Vaping status: Never Used   Substance Use Topics     Alcohol use: No     Alcohol/week: 0.0 standard drinks of alcohol             Family History:     Family History   Problem Relation Age of Onset     Kidney Disease Mother      Kidney failure Mother      Coronary Artery Disease Father      Cerebrovascular Disease Father      Heart Disease Father      Sleep Apnea Father      Hypertension Sister      Diabetes Sister      Cerebrovascular Disease Sister      Kidney Disease Sister      Breast Cancer No family hx of      Cancer - colorectal No family hx of      Ovarian Cancer No family hx of      Prostate Cancer No family hx of      Other Cancer No family hx of      Asthma No family hx of      Anesthesia Reaction No family hx of      Deep Vein Thrombosis (DVT) No family hx of      Melanoma No family hx of      Skin Cancer No family hx of      Thrombosis No  family hx of    No known family h/o bleeding or clotting disorder  Mom and sister both with IgA nephropathy and renal failure          Immunizations:     Immunization History   Administered Date(s) Administered     COVID-19 Monovalent 18+ (Moderna) 03/20/2021, 04/17/2021, 09/07/2021     COVID-19 Monovalent Booster 18+ (Moderna) 04/22/2022     DTAP (<7y) 09/22/1997     Flu, Unspecified 11/01/2018, 09/03/2020     HEPA 12/31/2008, 09/04/2009     HEPATITIS A (PEDS 12M-18Y) 12/31/2008     HPV 12/31/2008, 09/04/2009, 06/10/2010     HPV Quadrivalent 12/31/2008, 09/04/2009, 06/10/2010     HepA-Peds, Unspecified 09/04/2009     HepB 09/02/1997, 04/22/2005, 08/03/2005     Hepatits B (Peds <19Y) 09/25/1997, 04/22/2005, 08/03/2005     Influenza (IIV3) PF 12/31/2008, 12/23/2013, 10/15/2015, 09/22/2016, 09/25/2017, 09/28/2018     Influenza Vaccine >6 months (Alfuria,Fluzone) 09/19/2014, 10/07/2019, 09/03/2020, 10/13/2021, 09/15/2022     Influenza Vaccine IM Ages 6-35 Months 4 Valent (PF) 09/22/2022     Influenza Vaccine Im 4yrs+ 4 Valent CCIIV4 09/03/2020     MMR 04/22/2005     Mantoux Tuberculin Skin Test 12/12/2013     Meningococcal (Menomune ) 09/04/2009     Meningococcal ACWY (Menactra ) 09/04/2009     OPV, trivalent, live 09/22/1997     Pneumo Conj 13-V (2010&after) 08/20/2021     Pneumococcal 23 valent 12/30/2013, 12/28/2018     Poliovirus, inactivated (IPV) 09/22/1997     TD,PF 7+ (Tenivac) 04/22/2005     TDAP (Adacel,Boostrix) 09/04/2009     TDAP Vaccine (Adacel) 09/04/2009     TDAP Vaccine (Boostrix) 03/17/2022     Varicella 12/31/2008, 09/04/2009            Allergies:     Allergies   Allergen Reactions     Contrast Dye Rash     CT contrast allergy 12/14/19 rash over eyes. Need to have pre medication before a CT WITH CONTRAST      Diatrizoate Rash     CT contrast allergy 12/14/19 rash over eyes. Need to have pre medication before a CT WITH CONTRAST      Lisinopril Swelling     angioedema     Nitrous Oxide Other (See  Comments)     Sense of doom     Chlorhexidine Rash     Rash at site     Sulfa Antibiotics Rash     Muscle stiffness of neck     Dapsone      Methemoglobinemia     Furosemide Other (See Comments)     Skin flushing     Metrogel [Metronidazole]      Hives diffusely on body after vaginal administration.     Azithromycin Dizziness and Rash     Cefuroxime Rash             Medications:     Medications Prior to Admission   Medication Sig Dispense Refill Last Dose     acetaminophen (TYLENOL) 325 MG tablet Take 2 tablets (650 mg) by mouth every 4 hours as needed for mild pain 100 tablet 3      aspirin (ASA) 81 MG chewable tablet Take 1 tablet (81 mg) by mouth daily 90 tablet 3      azaTHIOprine (IMURAN) 50 MG tablet Take 3 tablets (150 mg) by mouth daily (Patient taking differently: Take 150 mg by mouth every evening) 270 tablet 3      calcitRIOL (ROCALTROL) 0.25 MCG capsule Take 1 capsule (0.25 mcg) by mouth daily 28 capsule 0      calcium carbonate 1250 MG/5ML SUSP suspension Take 5 mLs by mouth 2 times daily (with meals) Take 7.5 ml twice daily        cyanocobalamin (VITAMIN B-12) 1000 MCG tablet Take 1 tablet (1,000 mcg) by mouth daily 90 tablet 1      famotidine (PEPCID) 20 MG tablet Take 1 tablet (20 mg) by mouth 2 times daily 180 tablet 3      levothyroxine (SYNTHROID/LEVOTHROID) 25 MCG tablet TAKE 1.5 tablet daily 135 tablet 3      magnesium oxide (MAG-OX) 400 MG tablet Take 1 tablet (400 mg) by mouth 2 times daily 60 tablet 11      oxyCODONE (ROXICODONE) 5 MG tablet Take 1 tablet (5 mg) by mouth every 6 hours as needed for pain 6 tablet 0      polyethylene glycol (MIRALAX) 17 GM/Dose powder Take 17 g (1 capful) by mouth daily as needed for constipation 507 g 3      Prenatal Vit-Fe Fumarate-FA (PRENATAL MULTIVITAMIN W/IRON) 27-0.8 MG tablet Take 1 tablet by mouth daily (Patient taking differently: Take 1 tablet by mouth every evening) 90 tablet 3      simethicone (MYLICON) 125 MG chewable tablet Take 1 tablet (125 mg)  by mouth 4 times daily as needed for intestinal gas 120 tablet 3      sodium bicarbonate 650 MG tablet Take 1 tablet (650 mg) by mouth 2 times daily 180 tablet 3      tacrolimus (GENERIC EQUIVALENT) 1 MG capsule Take 2 capsules (2 mg) by mouth 2 times daily Total dose = 7 mg twice daily 360 capsule 3      tacrolimus (GENERIC EQUIVALENT) 5 MG capsule Take 1 capsule (5 mg) by mouth 2 times daily Total dose = 7 mg every AM and 7 mg every PM 60 capsule 11              Review of Systems & Physical Exam:     The Review of Systems is negative other than noted in the HPI      Patient Vitals for the past 24 hrs:   BP Temp Temp src Resp   06/11/23 2000 122/75 98.1  F (36.7  C) Oral 20   06/11/23 1851 133/83 98.6  F (37  C) Oral 18   06/11/23 1433 125/77 98.4  F (36.9  C) -- 16     Gen: Sitting up in bed, NAD  CV: RRR, 3/6 systolic flow murmur, no rubs or gallops  Lungs: CTAB, no rales, rhonchi, wheezing, non-labored breathing  Abd: Gravid, non-tender, non-distended  Ext: Trace peripheral extremity edema    SSE: Significant amount of thick, curd-like white vaginal discharge. Patient supine for over 20 minutes - no pooling of fluids, leaking. Negative ferning.   SVE: C/L/H   BSUS: Cephalic     FHT:   Monitoring External  FHT: Baseline 145 bpm; moderate variability; 10x10 accels present; no decelerations  TOCO: 2-3 ctx in 10 minutes          Data:     Recent Results (from the past 24 hour(s))   Wet preparation    Collection Time: 06/11/23  3:27 PM    Specimen: Vagina; Swab   Result Value Ref Range    Trichomonas Absent Absent    Yeast Absent Absent    Clue Cells Absent Absent    WBCs/high power field 1+ (A) None   Fetal fibronectin    Collection Time: 06/11/23  3:31 PM   Result Value Ref Range    Fetal Fibronectin Positive (A) Negative    FFN Specimen Integrity Satisfactory Specimen    UA with Microscopic    Collection Time: 06/11/23  3:32 PM   Result Value Ref Range    Color Urine Straw Colorless, Straw, Light Yellow, Yellow     Appearance Urine Clear Clear    Glucose Urine Negative Negative mg/dL    Bilirubin Urine Negative Negative    Ketones Urine Negative Negative mg/dL    Specific Gravity Urine 1.006 1.003 - 1.035    Blood Urine Large (A) Negative    pH Urine 7.5 (H) 5.0 - 7.0    Protein Albumin Urine 30 (A) Negative mg/dL    Urobilinogen Urine Normal Normal, 2.0 mg/dL    Nitrite Urine Negative Negative    Leukocyte Esterase Urine Small (A) Negative    Mucus Urine Present (A) None Seen /LPF    RBC Urine 151 (H) <=2 /HPF    WBC Urine 27 (H) <=5 /HPF   Fern Test for Rupture of Membranes    Collection Time: 06/11/23  3:33 PM   Result Value Ref Range    Fern Crystallization No ferning present No ferning present   CBC with platelets    Collection Time: 06/11/23  4:14 PM   Result Value Ref Range    WBC Count 13.0 (H) 4.0 - 11.0 10e3/uL    RBC Count 3.18 (L) 3.80 - 5.20 10e6/uL    Hemoglobin 9.9 (L) 11.7 - 15.7 g/dL    Hematocrit 30.1 (L) 35.0 - 47.0 %    MCV 95 78 - 100 fL    MCH 31.1 26.5 - 33.0 pg    MCHC 32.9 31.5 - 36.5 g/dL    RDW 13.6 10.0 - 15.0 %    Platelet Count 223 150 - 450 10e3/uL   Comprehensive metabolic panel    Collection Time: 06/11/23  4:14 PM   Result Value Ref Range    Sodium 133 (L) 136 - 145 mmol/L    Potassium 5.7 (H) 3.4 - 5.3 mmol/L    Chloride 102 98 - 107 mmol/L    Carbon Dioxide (CO2) 22 22 - 29 mmol/L    Anion Gap 9 7 - 15 mmol/L    Urea Nitrogen 28.5 (H) 6.0 - 20.0 mg/dL    Creatinine 1.57 (H) 0.51 - 0.95 mg/dL    Calcium 9.8 8.6 - 10.0 mg/dL    Glucose 93 70 - 99 mg/dL    Alkaline Phosphatase 85 35 - 104 U/L    AST 11 10 - 35 U/L    ALT 9 (L) 10 - 35 U/L    Protein Total 7.6 6.4 - 8.3 g/dL    Albumin 4.0 3.5 - 5.2 g/dL    Bilirubin Total 0.5 <=1.2 mg/dL    GFR Estimate 45 (L) >60 mL/min/1.73m2   Magnesium    Collection Time: 06/11/23  4:14 PM   Result Value Ref Range    Magnesium 1.5 (L) 1.7 - 2.3 mg/dL   Phosphorus    Collection Time: 06/11/23  4:14 PM   Result Value Ref Range    Phosphorus 3.3 2.5 - 4.5  mg/dL   EKG 12-lead, complete    Collection Time: 06/11/23  6:10 PM   Result Value Ref Range    Systolic Blood Pressure  mmHg    Diastolic Blood Pressure  mmHg    Ventricular Rate 85 BPM    Atrial Rate 85 BPM    NE Interval 144 ms    QRS Duration 72 ms     ms    QTc 447 ms    P Axis 46 degrees    R AXIS 11 degrees    T Axis 34 degrees    Interpretation ECG       Sinus rhythm  Possible Left atrial enlargement  Borderline ECG  When compared with ECG of 23-JAN-2020 11:33,  QRS axis Shifted left  Criteria for Septal infarct are no longer Present     Potassium    Collection Time: 06/11/23  6:42 PM   Result Value Ref Range    Potassium 4.8 3.4 - 5.3 mmol/L   Sodium    Collection Time: 06/11/23  6:42 PM   Result Value Ref Range    Sodium 134 (L) 136 - 145 mmol/L     [facility] US COMPREHENSIVE (6/8):   Patient here for a fetal growth scan secondary to a diagnosis of renal transplant and hydronephrosis. She is at 24w6d gestational age.     Active single fetus with behavior appropriate for gestational age.     Appropriate interval fetal growth.     Estimated fetal weight is appropriate for gestational age.     None of the anomalies commonly detected by ultrasound were evident in the limited fetal anatomic survey described above. Adequate heart views obtained.     Normal amniotic fluid volume.    Physician Attestation   I saw this patient with the resident and agree with the resident/fellow's findings and plan of care as documented in the note.      Key finding: I saw patient and reviewed BMP including K, WBC, wet prep.  Appreciate nephrology help with management of hyperkalemia.  Will give fluid, monitor K.     Delicia Neil MD  Date of Service (when I saw the patient): 6/11/2023

## 2023-06-12 NOTE — PROVIDER NOTIFICATION
06/12/23 1007   Provider Notification   Provider Name/Title Dr. Trinh Lr   Method of Notification Phone     Provider notified RN to start 500mL IV fluid bolus, fluid bolus started, see MAR

## 2023-06-12 NOTE — PROVIDER NOTIFICATION
06/12/23 0956   Provider Notification   Provider Name/Title Dr. Johnson   Method of Notification Electronic Page   Request Evaluate - Remote   Notification Reason Decels     Provider notified of decels with contractions on EFM monitor, pt repositioned, provider aware and reviewed tracing, RN continue to monitor

## 2023-06-12 NOTE — PLAN OF CARE
"Late entry due to patient care.      Received report and assumed care of patient from ERMELINDA Hay. IV bolused 1 L. Pt currently you every 3 min with soft palpation. Denies having pain and describes as \"tightening\" around her lower abd and back. US tech called for pt.  Writer called G3 regarding uc's and FHT's with concerns of pt leaving unit. Bedside US ordered by Dr Moreira. Pt Denies HA, epigastric pain or visual changes. Continues to have regular contractions with no discomfort/pain. Plan is to repeat Na and K every 4 hours. Start Magnesium if contractions become painful. Currently pt denies any changes in pain/discomfort.    Patient continues to have lower abd and back pain. Rates pain 4/10.  Warm packs applied. Dr Moreira at bedside often and updated on pt status. SVE done by resident with change in cervix from closed to 1cm. Orders to start magnesium and PCN for unknown GBS. US and toco readjusted often due to inability to have constant HR and decels occurring at times. Pt repositioned in bed as well. Her nephrostomy site limiting her positioning to right side due to pain. Pt monitors readjusted often by myself and other Ante nurses available throughout the night. Dr Moreira aware of UC's and FHR.     Magnesium started. Pt feeling flushed and hot. Cool compress applied to forehead and fan turned on.  Pt appeared to become restless. Encouraged to take deep breaths. Pt feeling nauseous, no vomiting. After 10 min of starting 4gm bolus, pt reported having chest discomfort/cp while rubbing her chest. She also c/o muscle ache and cramping to her neck and shoulders. Also reports tingling down her arms and hands. During this time pt's blood pressure, HR and RR increased. (please refer to vitals). Magnesium stopped with 9 min left of bolus. Dr Davidson called for bedside evaluation. Pt appeared SOB as well.  O2 sats continued to range between 96-97%RA. At this time, pt denies feeling SOB. Chest discomfort " "radiating from shoulders to back of her neck and down the front of her chest. Able to talk through episode. Dr Moreira arrived at bedside for evaluation. Stat EKG and labs ordered. Pt repositioned to high fowlers. Pt reports cp had decreased but continues to feel \"not good\". RN at continuous bedside assisting pt with needs. At that time, pt denies having calf or leg pain. MD updated often. Labs obtained. Troponin negative. Once pt was feeling better, writer left pt's room and discussed pt status with Wilma charge RN, Dr Moreira and Attending. Dr Moreira consulted with Nephrology and ICU again who did not feel pt needed higher level of care due to no beds being available and labs not being out of range per their standards.    Pt reports of feeling \"wetness\" in her panties.  Pt was going to be assisted to bathroom but RN felt better not having her walk due to her reporting not feeling well. Dr Moreira updated and requested pt to be positioned to pool. Spec exam done by Dr Moreira assisted by ERMELINDA Callahan.  Tests obtained.     Writer called ICU Nurses for guidance on nursing care related to pt's status.  ICU Charge RN and Nurse Practitioner came to pt's bedside to assess and give Nursing guidance.  Pt chest pain and SOB resolved spontaneously during ICU team visit although still has muscle cramping to shoulders and neck. Labs with POC reviewed. D50 25g and 5 Units Regular insulin given IVP. BS prior 123. Will monitor BS as ordered q3 min x4, q1hour x 4.  Pt also states contractions are feeling more frequent than before. Reports now that she is feeling better she can feel increase back and lower abd pain.        While writer was giving D50/insulin IVP, pt observed to having jerking movements of both upper and lower extremities about every 2-3 min.  G3 was paged and updated with new finding.  Pt woken during this time for assessment and reports feeling hand tingling as well.      Report given to ERMELINDA Mitchell who assumed " care of patient.

## 2023-06-12 NOTE — PROGRESS NOTES
Antepartum Progress Note    S: Patient feeling okay this AM. Was having some more symptoms of flushed and chest pain overnight that have since improved (see interval notes). No more wetness, not feeling ongoing leaking. Things that sensation was just from the gel from the pelvic exam. She feels like she is dehydrated this AM. Had a headache and neck pain overnight she thinks from laying in bed. She took tylenol and it improved. Now feeling more neck pain again. No tightening of belly.     O:   Patient Vitals for the past 24 hrs:   BP Temp Temp src Pulse Resp SpO2 Weight   06/12/23 1800 124/78 98.8  F (37.1  C) Oral -- -- 99 % 79.4 kg (175 lb)   06/12/23 1309 127/75 -- -- -- 20 100 % --   06/12/23 1106 122/63 98.4  F (36.9  C) Oral -- 20 98 % --   06/12/23 1007 115/60 -- -- -- 22 98 % --   06/12/23 0901 123/60 97.9  F (36.6  C) Oral -- 20 99 % --   06/12/23 0709 -- -- -- -- -- 98 % --   06/12/23 0704 -- -- -- -- -- 99 % --   06/12/23 0659 -- -- -- -- -- 98 % --   06/12/23 0654 -- -- -- -- -- 98 % --   06/12/23 0649 -- -- -- -- -- 98 % --   06/12/23 0644 -- -- -- -- -- 98 % --   06/12/23 0639 -- -- -- -- -- 98 % --   06/12/23 0634 -- -- -- -- -- 98 % --   06/12/23 0629 -- -- -- -- -- 98 % --   06/12/23 0624 -- -- -- -- -- 98 % --   06/12/23 0619 -- -- -- -- -- 98 % --   06/12/23 0617 124/63 -- -- -- -- -- --   06/12/23 0614 -- -- -- -- -- 98 % --   06/12/23 0610 124/66 -- -- -- -- -- --   06/12/23 0609 -- -- -- -- -- 98 % --   06/12/23 0604 -- -- -- -- -- 98 % --   06/12/23 0600 127/66 -- -- -- -- 98 % --   06/12/23 0554 -- -- -- -- -- 98 % --   06/12/23 0549 -- -- -- -- -- 99 % --   06/12/23 0544 -- -- -- -- -- 100 % --   06/12/23 0539 -- -- -- -- -- 98 % --   06/12/23 0534 -- -- -- -- -- 99 % --   06/12/23 0529 -- -- -- -- -- 99 % --   06/12/23 0525 126/61 -- -- -- -- -- --   06/12/23 0524 -- -- -- -- -- 100 % --   06/12/23 0519 -- -- -- -- -- 100 % --   06/12/23 0514 -- -- -- -- -- 99 % --   06/12/23 0511 127/61 --  -- -- -- -- --   06/12/23 0509 -- -- -- -- -- 99 % --   06/12/23 0504 -- -- -- -- -- 99 % --   06/12/23 0459 -- -- -- -- -- 98 % --   06/12/23 0455 (!) 144/74 -- -- -- -- -- --   06/12/23 0454 -- -- -- -- -- 99 % --   06/12/23 0449 -- -- -- -- -- 98 % --   06/12/23 0444 -- -- -- -- -- 98 % --   06/12/23 0440 134/65 -- -- -- -- -- --   06/12/23 0439 -- -- -- -- -- 98 % --   06/12/23 0434 -- -- -- -- -- 98 % --   06/12/23 0429 -- -- -- -- -- 98 % --   06/12/23 0425 132/62 -- -- -- -- -- --   06/12/23 0424 -- -- -- -- -- 99 % --   06/12/23 0419 -- -- -- -- -- 98 % --   06/12/23 0414 -- -- -- -- -- 98 % --   06/12/23 0410 (!) 145/74 -- -- -- -- -- --   06/12/23 0409 -- -- -- -- -- 99 % --   06/12/23 0404 -- -- -- -- -- 98 % --   06/12/23 0359 -- -- -- -- -- 100 % --   06/12/23 0355 (!) 143/76 -- -- -- -- -- --   06/12/23 0354 -- -- -- -- -- 99 % --   06/12/23 0350 (!) 151/80 -- -- -- -- -- --   06/12/23 0349 -- -- -- -- -- 99 % --   06/12/23 0345 (!) 151/80 -- -- -- -- -- --   06/12/23 0344 -- -- -- -- -- 99 % --   06/12/23 0340 (!) 155/82 98.7  F (37.1  C) Oral 96 28 -- --   06/12/23 0339 -- -- -- -- -- 99 % --   06/12/23 0335 (!) 154/81 -- -- -- -- -- --   06/12/23 0334 -- -- -- -- -- 99 % --   06/12/23 0331 (!) 160/81 -- -- -- -- -- --   06/12/23 0329 -- -- -- -- -- 99 % --   06/12/23 0325 (!) 163/83 -- -- -- -- -- --   06/12/23 0324 -- -- -- -- -- 99 % --   06/12/23 0321 (!) 164/83 -- -- -- -- -- --   06/12/23 0319 -- -- -- -- -- 100 % --   06/12/23 0315 (!) 149/79 -- -- 98 29 -- --   06/12/23 0314 -- -- -- -- -- 100 % --   06/12/23 0310 (!) 161/84 -- -- -- -- -- --   06/12/23 0305 (!) 158/80 -- -- -- -- -- --   06/12/23 0301 (!) 141/81 -- -- -- -- -- --   06/12/23 0258 (!) 149/86 -- -- -- -- -- --   06/12/23 0204 (!) 143/67 98.3  F (36.8  C) Oral 83 19 -- --   06/11/23 2325 127/78 97.5  F (36.4  C) Oral 79 20 -- --   06/11/23 2316 (!) 140/88 -- -- -- -- -- --   06/11/23 2000 122/75 98.1  F (36.7  C) Oral -- 20 --  --     Gen: NAD  CV:  Regular rate,well perfused   Pulm: non-labored breathing  Abd: Gravid, non-tender   : not examined   Ext: Warm, well perfused    Weight:   Wt Readings from Last 2 Encounters:   23 79.4 kg (175 lb)   23 (P) 81.1 kg (178 lb 12.8 oz)     FHT: 140 baseline, moderate variability, no accels, no recent decelerations  TOCO: no contractions    I/Os:   Intake/Output Summary (Last 24 hours) at 2023 1303  Last data filed at 2023 1209  Gross per 24 hour   Intake 2955 ml   Output 3175 ml   Net -220 ml     Labs:    Latest Reference Range & Units 23 10:03 23 13:56   Sodium 136 - 145 mmol/L 137 137   Potassium 3.4 - 5.3 mmol/L 4.9  4.9 5.2   Chloride 98 - 107 mmol/L 106 106   Carbon Dioxide (CO2) 22 - 29 mmol/L 14 (L) 15 (L)   Urea Nitrogen 6.0 - 20.0 mg/dL 24.1 (H) 23.6 (H)   Creatinine 0.51 - 0.95 mg/dL 1.22 (H) 1.20 (H)   GFR Estimate >60 mL/min/1.73m2 61 62   Calcium 8.6 - 10.0 mg/dL 9.4 9.4   Anion Gap 7 - 15 mmol/L 17 (H) 16 (H)   Magnesium 1.7 - 2.3 mg/dL 2.3    Glucose 70 - 99 mg/dL 123 (H) 104 (H)   (L): Data is abnormally low  (H): Data is abnormally high    Imaging:   Bedside ultrasound performed this AM with forward flow to umbilical artery, subjectively normal fluid, active fetal movement, cephalic position, breathing seen    A/P: Mona Wagner is a 32 year old  female at 25w3d by 8w4d US, HD#2 admitted for hyperkalemia in the setting of a renal transplant s/p percutaneous nephrostomy tube placement for ureteral stricture and likely obstruction, as well as rule out  labor. Her electrolyte abnormalities are likely post obstructive diuresis. Appreciate nephrology assistance managing fluids and electrolytes. No further contractions or symptoms of labor.    Hx renal transplant 2019 2/2 IgA nephropathy  Allograft hydronephrosis d/t ureteral stenosis  S/p percutaneous nephrostomy placement   Hyperkalemia, currently resolved  Hypomagnesemia, currently resolved      Suspected Post-ATN Diuresis   - Repeat labs every 4-6 hours depending on trend of potassium  - S/p Lokelma (use if K >5.5), s/p insulin/D50   - ICU admission and telemetry if K > 5.9  - s/p 1L NS > 500 mL > D#2 mIVF 150cc/h NS   - PTA Tacrolimus, Azathioprine 7mg BID  - Renal US: unchanged moderate hydro, percutaneous nephrostomy in place  - Is followed by nephrology, appreciate recommendations     Rule out  labor   Cervical change from c/l/h on admission to 50/H overnight.   - S/p BMZ x1, will receive second dose this PM.   - S/p magnesium load, which patient did not tolerate (see event notes overnight).   - Discontinued nifedipine for tocolysis given her non-reassuring fetal heart rate tracing    Hypothyroidism  Secondary Hyperparathyroidism with hypercalcemia s/p parathyroidectomy  - S/p resection of parathyroid glands on 23  - Follows with endo. Continue current levothyroxine prescription 37.5 mcg daily.  - TSH 2.28 on 23, free T4 1.0 and free T3 2.2  - Per endo recheck labs monthly     PNC  - Rh +, Rubella and Varicella non-immune, HepB/HIV/RPR NR, GCT passed, Hgb 8.5, Plts 188  - Other prenatal labs wnl  - Imaging: placenta anterior, comp US  EFW 15%, AC 20% (662g)    FWB  Non-reassuring today with variables and periods of minimal variability. Improved with repositioning and IVF fluid bolus. Will continue to monitor very closely.   - Continuous fetal monitoring   - s/p NNP consult   - Given tracing, did discuss possible CS and signed forms for CS via likely VML with likely classical hysterotomy. Consent: Discussed risks and benefits of procedure, including but not limited to bleeding, infection, injury to surrounding organs, injury to infant, and the potential need for another surgery should some injury go unrecognized or patient were to have continued bleeding. Patient had time to ask questions and expressed understanding of procedure and associated risks. Agreed to blood transfusion if  necessary. Consent signed.     Delivery Plan: as above    Patient seen and discussed with Dr. Tiana Lr MD MPH  OB/Gyn Resident PGY-3  2023  8:33 AM    Note edited by Fidelina Barton MD.  Maternal Fetal Medicine Fellow      Physician Attestation   I saw this patient with the resident and agree with the resident/fellow's findings and plan of care as documented in the note.      Key findings: In summary, Mona Wagner is a  admitted with electrolyte abnormalities and PTL, currently arrested at 1 cm.  Mona is clinically improving although intermittent variable decelerations are still noted on EFM.  Will plan CEFM with close monitoring of serum electrolytes and signs/symptoms of PTL at this time.     60 MINUTES SPENT BY ME on the date of service doing chart review, history, exam, documentation & further activities per the note.    I have personally reviewed the following data over the past 24 hrs:    18.9 (H)  \   9.8 (L)   / 260     136 107 25.0 (H) /  170 (H)   4.9 17 (L) 1.39 (H) \       ALT: 10 AST: 13 AP: 85 TBILI: 0.5   ALB: 3.6 TOT PROTEIN: 6.8 LIPASE: N/A       Trop: 9 BNP: N/A       INR:  1.06 PTT:  31   D-dimer:  N/A Fibrinogen:  609 (H)         Tiffany Montiel MD  Date of Service (when I saw the patient): 23

## 2023-06-12 NOTE — PLAN OF CARE
VSS, pt denies contractions, LOF or bleeding. Pt reports active fetal movement. Repeat potassium and magnesium normal. Pt now regular diet per providers. EFM continuous monitoring, see flowsheets for data. Strict I&O. IV infusing with normal saline. Pt taking tylenol with relief for neck pain. Pt denies SOB or chest pain. Continue current plan of care. Reports given to DARRYL Malcolm RN

## 2023-06-13 LAB
ALBUMIN SERPL BCG-MCNC: 3.4 G/DL (ref 3.5–5.2)
ALBUMIN SERPL BCG-MCNC: 3.5 G/DL (ref 3.5–5.2)
ALP SERPL-CCNC: 77 U/L (ref 35–104)
ALP SERPL-CCNC: 78 U/L (ref 35–104)
ALT SERPL W P-5'-P-CCNC: 12 U/L (ref 0–50)
ALT SERPL W P-5'-P-CCNC: 8 U/L (ref 10–35)
ANION GAP SERPL CALCULATED.3IONS-SCNC: 11 MMOL/L (ref 7–15)
ANION GAP SERPL CALCULATED.3IONS-SCNC: 12 MMOL/L (ref 7–15)
AST SERPL W P-5'-P-CCNC: 11 U/L (ref 10–35)
AST SERPL W P-5'-P-CCNC: 15 U/L (ref 0–45)
BASE EXCESS BLDV CALC-SCNC: 0.8 MMOL/L (ref -7.7–1.9)
BILIRUB SERPL-MCNC: 0.2 MG/DL
BILIRUB SERPL-MCNC: 0.4 MG/DL
BUN SERPL-MCNC: 26.9 MG/DL (ref 6–20)
BUN SERPL-MCNC: 29.6 MG/DL (ref 6–20)
CALCIUM SERPL-MCNC: 8.9 MG/DL (ref 8.6–10)
CALCIUM SERPL-MCNC: 9.2 MG/DL (ref 8.6–10)
CHLORIDE SERPL-SCNC: 102 MMOL/L (ref 98–107)
CHLORIDE SERPL-SCNC: 103 MMOL/L (ref 98–107)
CREAT SERPL-MCNC: 1.28 MG/DL (ref 0.51–0.95)
CREAT SERPL-MCNC: 1.49 MG/DL (ref 0.51–0.95)
DEPRECATED HCO3 PLAS-SCNC: 22 MMOL/L (ref 22–29)
DEPRECATED HCO3 PLAS-SCNC: 24 MMOL/L (ref 22–29)
GFR SERPL CREATININE-BSD FRML MDRD: 48 ML/MIN/1.73M2
GFR SERPL CREATININE-BSD FRML MDRD: 58 ML/MIN/1.73M2
GLUCOSE SERPL-MCNC: 128 MG/DL (ref 70–99)
GLUCOSE SERPL-MCNC: 190 MG/DL (ref 70–99)
HCO3 BLDV-SCNC: 25 MMOL/L (ref 21–28)
O2/TOTAL GAS SETTING VFR VENT: 21 %
PCO2 BLDV: 36 MM HG (ref 40–50)
PH BLDV: 7.45 [PH] (ref 7.32–7.43)
PO2 BLDV: 83 MM HG (ref 25–47)
POTASSIUM SERPL-SCNC: 4.6 MMOL/L (ref 3.4–5.3)
POTASSIUM SERPL-SCNC: 5 MMOL/L (ref 3.4–5.3)
PROT SERPL-MCNC: 6.4 G/DL (ref 6.4–8.3)
PROT SERPL-MCNC: 6.8 G/DL (ref 6.4–8.3)
SODIUM SERPL-SCNC: 136 MMOL/L (ref 136–145)
SODIUM SERPL-SCNC: 138 MMOL/L (ref 136–145)
TACROLIMUS BLD-MCNC: 9.7 UG/L (ref 5–15)
TME LAST DOSE: NORMAL H
TME LAST DOSE: NORMAL H

## 2023-06-13 PROCEDURE — 80053 COMPREHEN METABOLIC PANEL: CPT

## 2023-06-13 PROCEDURE — 250N000009 HC RX 250

## 2023-06-13 PROCEDURE — 82803 BLOOD GASES ANY COMBINATION: CPT

## 2023-06-13 PROCEDURE — 80197 ASSAY OF TACROLIMUS: CPT

## 2023-06-13 PROCEDURE — 120N000002 HC R&B MED SURG/OB UMMC

## 2023-06-13 PROCEDURE — 258N000003 HC RX IP 258 OP 636

## 2023-06-13 PROCEDURE — 250N000012 HC RX MED GY IP 250 OP 636 PS 637

## 2023-06-13 PROCEDURE — 250N000013 HC RX MED GY IP 250 OP 250 PS 637

## 2023-06-13 PROCEDURE — 99232 SBSQ HOSP IP/OBS MODERATE 35: CPT | Mod: 25 | Performed by: OBSTETRICS & GYNECOLOGY

## 2023-06-13 PROCEDURE — 250N000012 HC RX MED GY IP 250 OP 636 PS 637: Performed by: STUDENT IN AN ORGANIZED HEALTH CARE EDUCATION/TRAINING PROGRAM

## 2023-06-13 PROCEDURE — 36415 COLL VENOUS BLD VENIPUNCTURE: CPT

## 2023-06-13 PROCEDURE — 84155 ASSAY OF PROTEIN SERUM: CPT

## 2023-06-13 PROCEDURE — 59025 FETAL NON-STRESS TEST: CPT | Mod: 26 | Performed by: OBSTETRICS & GYNECOLOGY

## 2023-06-13 PROCEDURE — 250N000013 HC RX MED GY IP 250 OP 250 PS 637: Performed by: STUDENT IN AN ORGANIZED HEALTH CARE EDUCATION/TRAINING PROGRAM

## 2023-06-13 PROCEDURE — 84450 TRANSFERASE (AST) (SGOT): CPT

## 2023-06-13 RX ORDER — POLYETHYLENE GLYCOL 3350 17 G/17G
17 POWDER, FOR SOLUTION ORAL DAILY PRN
Status: DISCONTINUED | OUTPATIENT
Start: 2023-06-13 | End: 2023-06-16 | Stop reason: HOSPADM

## 2023-06-13 RX ADMIN — TACROLIMUS 5 MG: 5 CAPSULE ORAL at 22:32

## 2023-06-13 RX ADMIN — CYANOCOBALAMIN TAB 1000 MCG 1000 MCG: 1000 TAB at 08:35

## 2023-06-13 RX ADMIN — CALCIUM CARBONATE 1250 MG: 1250 SUSPENSION ORAL at 10:28

## 2023-06-13 RX ADMIN — TACROLIMUS 5 MG: 5 CAPSULE ORAL at 10:29

## 2023-06-13 RX ADMIN — TACROLIMUS 2 MG: 1 CAPSULE ORAL at 10:29

## 2023-06-13 RX ADMIN — PRENATAL VITAMINS-IRON FUMARATE 27 MG IRON-FOLIC ACID 0.8 MG TABLET 1 TABLET: at 22:32

## 2023-06-13 RX ADMIN — SODIUM BICARBONATE 650 MG TABLET 650 MG: at 08:35

## 2023-06-13 RX ADMIN — MAGNESIUM OXIDE TAB 400 MG (241.3 MG ELEMENTAL MG) 400 MG: 400 (241.3 MG) TAB at 08:35

## 2023-06-13 RX ADMIN — ACETAMINOPHEN 650 MG: 325 TABLET, FILM COATED ORAL at 04:12

## 2023-06-13 RX ADMIN — CALCIUM CARBONATE 1250 MG: 1250 SUSPENSION ORAL at 19:58

## 2023-06-13 RX ADMIN — SODIUM BICARBONATE: 84 INJECTION, SOLUTION INTRAVENOUS at 11:22

## 2023-06-13 RX ADMIN — POLYETHYLENE GLYCOL 3350 17 G: 17 POWDER, FOR SOLUTION ORAL at 04:15

## 2023-06-13 RX ADMIN — CALCITRIOL CAPSULES 0.25 MCG 0.25 MCG: 0.25 CAPSULE ORAL at 10:28

## 2023-06-13 RX ADMIN — SODIUM BICARBONATE: 84 INJECTION, SOLUTION INTRAVENOUS at 04:01

## 2023-06-13 RX ADMIN — TACROLIMUS 2 MG: 1 CAPSULE ORAL at 22:31

## 2023-06-13 RX ADMIN — AZATHIOPRINE 150 MG: 50 TABLET ORAL at 22:35

## 2023-06-13 RX ADMIN — Medication 37.5 MCG: at 08:35

## 2023-06-13 RX ADMIN — MAGNESIUM OXIDE TAB 400 MG (241.3 MG ELEMENTAL MG) 400 MG: 400 (241.3 MG) TAB at 22:32

## 2023-06-13 RX ADMIN — SODIUM BICARBONATE 650 MG TABLET 650 MG: at 22:32

## 2023-06-13 ASSESSMENT — ACTIVITIES OF DAILY LIVING (ADL)
ADLS_ACUITY_SCORE: 24

## 2023-06-13 NOTE — PROGRESS NOTES
Received call regarding Tacrolimus level, dosing and sodium bicarb supplementation. There was some confusion regarding sodium bicarb infusion.  - Patient is receiving IV fluids - currently NS at 150 ml/h. Bicarb is low at 15. PH not checked. Recommend checking VBG. Instead of NS, suggest giving 150 mEq of Sodium Bicarb in 1 L of D5W. Tac level drawn this AM at 2:20 was 3 hrs after receiving Tac, so was not a true reflection of Tac dosing. At this point would continue Tacrolimus dosing as previously recommended. Per y discussion with OB, Nifedipine has been stopped, recommend getting Tac level daily.    Ramy Argueta M.D  Nephrology Fellow  [phone]

## 2023-06-13 NOTE — PLAN OF CARE
Pt appears to be resting comfortably with equal rise and fall of chest. Denies CP or SOB. Denies Pre-E symptoms or  labor symptoms. Denies leaking of fluids. On continuous monitoring. See Labor record for details.  was present when NICU consulted.  Questions answered by MD. Family at the bedside and supportive. Plan for labs in the am before next tacrolimus dose.

## 2023-06-13 NOTE — PLAN OF CARE
Goal Outcome Evaluation:      Plan of Care Reviewed With: patient    Overall Patient Progress: improvingOverall Patient Progress: improving         VSS and afebrile. Pt denies LOF and all pre-eclampsia symptoms. Pt reports active fetal movement and denies cramping.  EFM continuous, see flowsheets for FHR and uterine assessment. Pt tolerating oral intake appropriately. Pt denies SOB or chest pain. Plan per provider is to redraw labs at 2000. Continue with plan of care. Report given to Mary Grace Gutierrez RN.

## 2023-06-13 NOTE — PROGRESS NOTES
Antepartum Progress Note    S: Patient feeling better this morning. Pt was able to get some good sleep last night. PNT tube continues to have high output. Intermittent contractions on monitor but patient does not feel them. Notes new back pain but thinks likely due to hospital bed. Seems musculoskeletal in nature. Denies cramping abdominal pain, LOF, bleeding. Pt concerned about 190 glucose check. Had discussion with patient and in-laws that her glucose is likely elevated d/t steroids, D50 and D5 administration. Is wondering about ongoing diuresis.     O:   Patient Vitals for the past 24 hrs:   BP Temp Temp src Pulse Resp SpO2 Weight   06/13/23 0858 131/73 97.8  F (36.6  C) Oral -- 16 -- --   06/13/23 0850 -- -- -- -- -- -- 80.3 kg (177 lb 1.6 oz)   06/13/23 0400 135/75 -- -- -- 18 -- --   06/12/23 2353 -- -- -- 89 17 -- --   06/12/23 2347 119/67 -- -- -- -- -- --   06/12/23 2158 -- 98.4  F (36.9  C) -- -- -- -- --   06/12/23 2000 130/79 98.4  F (36.9  C) Oral 93 20 -- --   06/12/23 1800 124/78 98.8  F (37.1  C) Oral -- -- 99 % 79.4 kg (175 lb)   06/12/23 1309 127/75 -- -- -- 20 100 % --     Gen: Sitting up in bed, NAD  CV:  Warm, well perfused   Pulm: Non-labored breathing  Abd: Gravid, non-tender   Ext: Trace swelling    Weight:   Wt Readings from Last 2 Encounters:   06/13/23 80.3 kg (177 lb 1.6 oz)   06/09/23 (P) 81.1 kg (178 lb 12.8 oz)     FHT: 140 baseline, moderate variability, few accels, no recent decelerations  TOCO: no contractions    I/Os:     Intake/Output Summary (Last 24 hours) at 6/13/2023 1238  Last data filed at 6/13/2023 1048  Gross per 24 hour   Intake 3238 ml   Output 3625 ml   Net -387 ml       Labs:      Latest Reference Range & Units 06/12/23 19:53 06/13/23 06:49   Sodium 136 - 145 mmol/L 136 136   Potassium 3.4 - 5.3 mmol/L 4.9 5.0   Chloride 98 - 107 mmol/L 107 102   Carbon Dioxide (CO2) 22 - 29 mmol/L 17 (L) 22   Urea Nitrogen 6.0 - 20.0 mg/dL 25.0 (H) 26.9 (H)   Creatinine 0.51 - 0.95  mg/dL 1.39 (H) 1.28 (H)   GFR Estimate >60 mL/min/1.73m2 52 (L) 58 (L)   Calcium 8.6 - 10.0 mg/dL 8.8 8.9   Anion Gap 7 - 15 mmol/L 12 12   Albumin 3.5 - 5.2 g/dL 3.6 3.4 (L)   Protein Total 6.4 - 8.3 g/dL 6.8 6.4   Alkaline Phosphatase 35 - 104 U/L 85 78   ALT 10 - 35 U/L 10 8 (L)   AST 10 - 35 U/L 13 11   Bilirubin Total <=1.2 mg/dL 0.5 0.4   Glucose 70 - 99 mg/dL 170 (H) 190 (H)   (L): Data is abnormally low  (H): Data is abnormally high     Latest Reference Range & Units 23 06:49   FIO2  21   Ph Venous 7.32 - 7.43  7.45 (H)   PCO2 Venous 40 - 50 mm Hg 36 (L)   PO2 Venous 25 - 47 mm Hg 83 (H)   Bicarbonate Venous 21 - 28 mmol/L 25   Base Excess Venous -7.7 - 1.9 mmol/L 0.8   (H): Data is abnormally high  (L): Data is abnormally low    Tacrolimus (): Pending    Potassium Trend:  - 5.7,  -  4.8,  - 5.6 > 5.7 > 4.9 > 5.2 > 4.9,  5.0    Tacrolimus Trend:  - 4.6,  - 25.4 (taken 3 hours after tacrolimus admin),  - pending    Imaging:   No new imaging today    A/P: Mona Wagner is a 32 year old  female at 25w4d by 8w4d US, HD#3 admitted for hyperkalemia in the setting of a renal transplant s/p percutaneous nephrostomy tube placement for ureteral stricture and likely obstruction, as well as rule out  labor. Her electrolyte abnormalities are likely post obstructive diuresis. Appreciate nephrology assistance managing fluids and electrolytes. Patient continues to have intermittent contractions but does not feel them. FHT has looked better the last 24 hours although there have been some intermittent decels.     Plan for   - PM labs   - Nephrology f/u  - QID FHT monitoring  - Discontinue IVF and bicarb    Hx renal transplant  IgA nephropathy  Allograft hydronephrosis d/t ureteral stenosis  S/p percutaneous nephrostomy placement   Hyperkalemia, currently resolved  Hypomagnesemia, currently resolved     Suspected Post-ATN Diuresis   - Repeat labs PM   - S/p Lokelma (use  if K >5.5), s/p insulin/D50   - ICU admission and telemetry if K > 5.9  - s/p 1L NS > 500 mL > D#2 mIVF 150cc/h NS. Will now discontinue IVF today  - PTA Tacrolimus, Azathioprine 7mg BID   -daily tacrolimus level per nephrology   - Renal US: unchanged moderate hydro, percutaneous nephrostomy in place  - Is followed by nephrology, appreciate recommendations     Rule out  labor   Cervical change from c/l/h on admission to 1/50/H overnight two nights ago. No new obstetric concerns.  - S/p BMZ course (-)  - S/p magnesium load    Hypothyroidism  Secondary Hyperparathyroidism with hypercalcemia s/p parathyroidectomy  - S/p resection of parathyroid glands on 23  - Follows with endo. Continue current levothyroxine prescription 37.5 mcg daily.  - TSH 2.28 on 23, free T4 1.0 and free T3 2.2  - Per endo recheck labs monthly     PNC  - Rh +, Rubella and Varicella non-immune, HepB/HIV/RPR NR, GCT passed, Hgb 8.5, Plts 188  - Other prenatal labs wnl  - Imaging: placenta anterior, comp US  EFW 15%, AC 20% (662g)    FWB  FHT was non-reassuring on  with variables and periods of minimal variability. Improved with repositioning and IVF fluid bolus. Continued to monitor closely. Continues to have intermittent decels but overall tracing looks much improved compared to yesterday.   - Continuos fetal monitoring > QID monitoring given improvement in tracing  - s/p NNP consult   - Given tracing, did discuss possible CS and signed forms for CS via likely VML with likely classical hysterotomy. Consent: Discussed risks and benefits of procedure, including but not limited to bleeding, infection, injury to surrounding organs, injury to infant, and the potential need for another surgery should some injury go unrecognized or patient were to have continued bleeding. Patient had time to ask questions and expressed understanding of procedure and associated risks. Agreed to blood transfusion if necessary. Consent signed.      Delivery Plan: as above    Patient seen and discussed with Dr. Tiana Melo, MS4  OBGYN MFM    Resident/Fellow Attestation   I, Melani Lr MD, was present with the medical/MAR student who participated in the service and in the documentation of the note.  I have verified the history and personally performed the physical exam and medical decision making.  I agree with the assessment and plan of care as documented in the note.      Mleani Lr MD  PGY3  Date of Service (when I saw the patient): 23    Physician Attestation   I, Tiffany Montiel MD, was present with the medical/MAR student who participated in the service and in the documentation of the note.  I have verified the history and personally performed the physical exam and medical decision making.  I agree with the assessment and plan of care as documented in the note.      Key findings: In summary, Mona Wagner is a  at 25w4d admitted due to post-nephrostomy tube placement AT with electrolyte abnormalities and  contractions.  Clinical status has improved but still needing ongoing inpatient management for fluid administration due to large volume diuresis and close monitoring of electrolytes.  As fetal status appears improved/stable, will plan to decrease to QID EFM at this time.      45 MINUTES SPENT BY ME on the date of service doing chart review, history, exam, documentation & further activities per the note.    I have personally reviewed the following data over the past 24 hrs:    N/A  \   N/A   / N/A     136 102 26.9 (H) /  190 (H)   5.0 22 1.28 (H) \       ALT: 8 (L) AST: 11 AP: 78 TBILI: 0.4   ALB: 3.4 (L) TOT PROTEIN: 6.4 LIPASE: N/A         Tiffany Montiel MD  Date of Service (when I saw the patient): 23

## 2023-06-13 NOTE — PLAN OF CARE
Pt able to sleep well during the night.  Family member present. C/o pain around incisional site. Site appears normal. No redness or warmth noted. Monitor bands were pressing tight against area which may have caused the pain. Tylenol and warm packs were given with adequate pain relief.  Contractions present on monitor.  Pt denies feeling any tightening, pain or discomfort. Denies any leaking of fluid or Pre-E symptoms.

## 2023-06-13 NOTE — CONSULTS
_       [facility]                       Antepartum Counseling Consult      I was asked to provide antepartum counseling for Mona Wagner at the request of Delicia Neil MD secondary to . Ms. Wagner is currently 25w3d weeks and has a hx significant for IgA nehropathy s/p  donor kidney transplant in 2019 with development of post-transplant allograft hydronephrosis due to ureteral stenosis. Betamethasone was administered on . Ms. Wagner, accompanied by her , was counseled on the expected hospital course, potential risks, and outcomes associated with an infant born at approximately 25 weeks gestation. The counseling included: morbidity, mortality, initial delivery room stabilization, respiratory course, lung development, RDS, retinopathy of prematurity, infection (including NEC), intraventricular hemorrhage, nutrition, growth and development, and long term outcomes. We also discussed the differences in infants born at 25 weeks compared to those who deliver closer to term. Please feel free to call with any additional questions or concerns.      Naomi Teixeira MD  - Medicine Fellow

## 2023-06-14 LAB
ALBUMIN SERPL BCG-MCNC: 3.8 G/DL (ref 3.5–5.2)
ALP SERPL-CCNC: 81 U/L (ref 35–104)
ALT SERPL W P-5'-P-CCNC: 11 U/L (ref 0–50)
ANION GAP SERPL CALCULATED.3IONS-SCNC: 10 MMOL/L (ref 7–15)
AST SERPL W P-5'-P-CCNC: 17 U/L (ref 0–45)
ATRIAL RATE - MUSE: 98 BPM
BILIRUB SERPL-MCNC: 0.4 MG/DL
BUN SERPL-MCNC: 26.9 MG/DL (ref 6–20)
CALCIUM SERPL-MCNC: 9.5 MG/DL (ref 8.6–10)
CHLORIDE SERPL-SCNC: 103 MMOL/L (ref 98–107)
CREAT SERPL-MCNC: 1.29 MG/DL (ref 0.51–0.95)
DEPRECATED HCO3 PLAS-SCNC: 25 MMOL/L (ref 22–29)
DIASTOLIC BLOOD PRESSURE - MUSE: NORMAL MMHG
GFR SERPL CREATININE-BSD FRML MDRD: 57 ML/MIN/1.73M2
GLUCOSE BLDC GLUCOMTR-MCNC: 103 MG/DL (ref 70–99)
GLUCOSE BLDC GLUCOMTR-MCNC: 115 MG/DL (ref 70–99)
GLUCOSE BLDC GLUCOMTR-MCNC: 116 MG/DL (ref 70–99)
GLUCOSE BLDC GLUCOMTR-MCNC: 122 MG/DL (ref 70–99)
GLUCOSE SERPL-MCNC: 95 MG/DL (ref 70–99)
INTERPRETATION ECG - MUSE: NORMAL
P AXIS - MUSE: 73 DEGREES
POTASSIUM SERPL-SCNC: 5 MMOL/L (ref 3.4–5.3)
PR INTERVAL - MUSE: 166 MS
PROT SERPL-MCNC: 7.2 G/DL (ref 6.4–8.3)
QRS DURATION - MUSE: 76 MS
QT - MUSE: 388 MS
QTC - MUSE: 495 MS
R AXIS - MUSE: 170 DEGREES
SODIUM SERPL-SCNC: 138 MMOL/L (ref 136–145)
SYSTOLIC BLOOD PRESSURE - MUSE: NORMAL MMHG
T AXIS - MUSE: 75 DEGREES
TACROLIMUS BLD-MCNC: 6.2 UG/L (ref 5–15)
TME LAST DOSE: NORMAL H
TME LAST DOSE: NORMAL H
VENTRICULAR RATE- MUSE: 98 BPM

## 2023-06-14 PROCEDURE — 258N000003 HC RX IP 258 OP 636

## 2023-06-14 PROCEDURE — 250N000013 HC RX MED GY IP 250 OP 250 PS 637: Performed by: STUDENT IN AN ORGANIZED HEALTH CARE EDUCATION/TRAINING PROGRAM

## 2023-06-14 PROCEDURE — 36415 COLL VENOUS BLD VENIPUNCTURE: CPT

## 2023-06-14 PROCEDURE — 99232 SBSQ HOSP IP/OBS MODERATE 35: CPT | Mod: 25 | Performed by: OBSTETRICS & GYNECOLOGY

## 2023-06-14 PROCEDURE — 250N000012 HC RX MED GY IP 250 OP 636 PS 637

## 2023-06-14 PROCEDURE — 59025 FETAL NON-STRESS TEST: CPT | Mod: 26 | Performed by: OBSTETRICS & GYNECOLOGY

## 2023-06-14 PROCEDURE — 120N000002 HC R&B MED SURG/OB UMMC

## 2023-06-14 PROCEDURE — 250N000012 HC RX MED GY IP 250 OP 636 PS 637: Performed by: STUDENT IN AN ORGANIZED HEALTH CARE EDUCATION/TRAINING PROGRAM

## 2023-06-14 PROCEDURE — 80053 COMPREHEN METABOLIC PANEL: CPT

## 2023-06-14 RX ADMIN — SODIUM CHLORIDE 500 ML: 9 INJECTION, SOLUTION INTRAVENOUS at 13:01

## 2023-06-14 RX ADMIN — TACROLIMUS 5 MG: 5 CAPSULE ORAL at 22:19

## 2023-06-14 RX ADMIN — TACROLIMUS 2 MG: 1 CAPSULE ORAL at 10:14

## 2023-06-14 RX ADMIN — CALCITRIOL CAPSULES 0.25 MCG 0.25 MCG: 0.25 CAPSULE ORAL at 10:13

## 2023-06-14 RX ADMIN — PRENATAL VITAMINS-IRON FUMARATE 27 MG IRON-FOLIC ACID 0.8 MG TABLET 1 TABLET: at 22:19

## 2023-06-14 RX ADMIN — TACROLIMUS 5 MG: 5 CAPSULE ORAL at 10:14

## 2023-06-14 RX ADMIN — CALCIUM CARBONATE 1250 MG: 1250 SUSPENSION ORAL at 18:11

## 2023-06-14 RX ADMIN — Medication 37.5 MCG: at 09:40

## 2023-06-14 RX ADMIN — LACTASE TAB 3000 UNIT 3000 UNITS: 3000 TAB at 14:15

## 2023-06-14 RX ADMIN — AZATHIOPRINE 150 MG: 50 TABLET ORAL at 22:20

## 2023-06-14 RX ADMIN — MAGNESIUM OXIDE TAB 400 MG (241.3 MG ELEMENTAL MG) 400 MG: 400 (241.3 MG) TAB at 22:19

## 2023-06-14 RX ADMIN — CYANOCOBALAMIN TAB 1000 MCG 1000 MCG: 1000 TAB at 09:39

## 2023-06-14 RX ADMIN — CALCIUM CARBONATE 1250 MG: 1250 SUSPENSION ORAL at 10:10

## 2023-06-14 RX ADMIN — SODIUM BICARBONATE 650 MG TABLET 650 MG: at 22:20

## 2023-06-14 RX ADMIN — TACROLIMUS 2 MG: 1 CAPSULE ORAL at 22:19

## 2023-06-14 ASSESSMENT — ACTIVITIES OF DAILY LIVING (ADL)
ADLS_ACUITY_SCORE: 20
ADLS_ACUITY_SCORE: 24
ADLS_ACUITY_SCORE: 24
ADLS_ACUITY_SCORE: 20
ADLS_ACUITY_SCORE: 24
ADLS_ACUITY_SCORE: 24
ADLS_ACUITY_SCORE: 20
ADLS_ACUITY_SCORE: 20
ADLS_ACUITY_SCORE: 24
ADLS_ACUITY_SCORE: 20

## 2023-06-14 NOTE — PLAN OF CARE
D: Patient feeling much better today. Was able to move around in her room after being changed to QID monitoring from continuous. VSS. Patient is emptying her bag and recording I&O. Hopeful that she will be able to discharge to home. P: Continue to monitor.

## 2023-06-14 NOTE — PROGRESS NOTES
Antepartum Progress Note    S: Patient feeling better this morning. Pt was able to get some good sleep last night. PNT tube continues to have high output. Intermittent contractions on monitor but patient does not feel them. Feeling a little more dehydrated since she slept in. Denies cramping abdominal pain, LOF, bleeding. Wondering about glucose checks. Is having some some itching on backs of hands, improved with lotion. Some itching on feet too.     O:   Patient Vitals for the past 24 hrs:   BP Temp Temp src Resp Weight   06/14/23 0937 130/80 98.1  F (36.7  C) Oral 18 --   06/14/23 0700 -- -- -- -- 79.2 kg (174 lb 11.2 oz)   06/14/23 0600 138/73 98  F (36.7  C) Oral 20 --   06/13/23 2300 126/71 98  F (36.7  C) Oral 24 --   06/13/23 2001 120/67 97.9  F (36.6  C) Oral -- --   06/13/23 1634 133/78 98.1  F (36.7  C) Oral 16 --   06/13/23 1352 118/63 98.1  F (36.7  C) Oral -- --     Gen: Sitting up in bed, NAD  CV:  Warm, well perfused   Pulm: Non-labored breathing  Abd: Gravid, non-tender   Ext: Trace swelling, no rash noted on hands or feet     Weight:   Wt Readings from Last 2 Encounters:   06/14/23 79.2 kg (174 lb 11.2 oz)   06/09/23 (P) 81.1 kg (178 lb 12.8 oz)     FHT: 145 baseline, moderate variability, few accels, no decelerations  TOCO: 3 in 10 contractions    I/Os:       Intake/Output Summary (Last 24 hours) at 6/14/2023 1327  Last data filed at 6/14/2023 1312  Gross per 24 hour   Intake 4115 ml   Output 5665 ml   Net -1550 ml         Labs:     Recent Labs   Lab 06/14/23  1135 06/14/23  0950 06/14/23  0604 06/13/23  2224 06/13/23  0649 06/12/23  1953   * 95 116* 128* 190* 170*        Latest Reference Range & Units 06/14/23 09:50   Sodium 136 - 145 mmol/L 138   Potassium 3.4 - 5.3 mmol/L 5.0   Chloride 98 - 107 mmol/L 103   Carbon Dioxide (CO2) 22 - 29 mmol/L 25   Urea Nitrogen 6.0 - 20.0 mg/dL 26.9 (H)   Creatinine 0.51 - 0.95 mg/dL 1.29 (H)   GFR Estimate >60 mL/min/1.73m2 57 (L)   Calcium 8.6 - 10.0  mg/dL 9.5   Anion Gap 7 - 15 mmol/L 10   Albumin 3.5 - 5.2 g/dL 3.8   Protein Total 6.4 - 8.3 g/dL 7.2   Alkaline Phosphatase 35 - 104 U/L 81   ALT 0 - 50 U/L 11   AST 0 - 45 U/L 17   Bilirubin Total <=1.2 mg/dL 0.4   Glucose 70 - 99 mg/dL 95   (H): Data is abnormally high  (L): Data is abnormally low    Tacro level  PM 6.2    Imaging:   No new imaging today    A/P: Mona Wagner is a 32 year old  female at 25w5d by 8w4d US, HD#4 admitted for hyperkalemia in the setting of a renal transplant s/p percutaneous nephrostomy tube placement for ureteral stricture and likely obstruction, as well as rule out  labor. Her electrolyte abnormalities are likely post obstructive diuresis. Appreciate nephrology assistance managing fluids and electrolytes. Patient continues to have intermittent contractions but does not feel them.     Plan for   - IVF bolus this PM     Hx renal transplant  IgA nephropathy  Allograft hydronephrosis d/t ureteral stenosis  S/p percutaneous nephrostomy placement   Hyperkalemia, currently resolved  Hypomagnesemia, currently resolved     Suspected Post-ATN Diuresis   - Repeat labs PM   - S/p Lokelma (use if K >5.5), s/p insulin/D50  - S/p D5W 1000mL with sodium bicarb 150 mEq/L infusion   - ICU admission and telemetry if K > 5.9  - will do one time bolus 500 mL NS given ongoing diuresis today and increased frequency of contractions on the monitor.   - PTA Tacrolimus, Azathioprine 7mg BID   -daily tacrolimus level per nephrology. Per chart review, goal 4-6, slightly elevated yesterday PM, will order again for tomorrow AM  - Renal US: unchanged moderate hydro, percutaneous nephrostomy in place  - Is followed by nephrology, appreciate recommendations     Rule out  labor   Cervical change from c/l/h on admission to 1/50/H overnight two nights ago. No new obstetric concerns.  - S/p BMZ course (-)  - S/p magnesium load    cHTN   Goal BP <130/80. Reviewed blood pressures  with transplant nephrology team. Discussed a prn plan for antihypertensives. Per this discussion, would be okay to start nifedipine or amlodipine for antihypertensive control. Specifically, no concern from that team about interaction with tacrolimus. Favor amlodipine 2.5 mg daily as this is overall a smaller dose than the initial nifed dose, in an effort to not drastically decrease blood pressures. Previously, had been started on labetalol but experience nipple tenderness with this medication and it was discontinued. Plan for now is to continue to monitor BP. If >50% above goal, will plan to take orthostatic BP readings and possibly start amlodipine 2.5 mg daily.     Hypothyroidism  Secondary Hyperparathyroidism with hypercalcemia s/p parathyroidectomy  - S/p resection of parathyroid glands on 4/28/23  - Follows with endo. Continue current levothyroxine prescription 37.5 mcg daily.  - TSH 2.28 on 6/6/23, free T4 1.0 and free T3 2.2  - Per endo recheck labs monthly     PNC  - Rh +, Rubella and Varicella non-immune, HepB/HIV/RPR NR, GCT passed, Hgb 8.5, Plts 188  - Other prenatal labs wnl  - Imaging: placenta anterior, comp US 6/8 EFW 15%, AC 20% (662g)    FWB  Overall appropriate for gestational age, contractions this afternoon that patient is not feeling.   - TID monitoring   - s/p NNP consult   - s/p classical CS & blood transfusion consent earlier in the hospitalization during a period of Cat II tracing    Delivery Plan: as clinically indicated, no indication at this time.     Patient seen and discussed with Dr[facility] Tiana Melo, MS4  [facility]    Physician Attestation   I, iTffany Montiel MD, was present with the medical/MAR student who participated in the service and in the documentation of the note.  I have verified the history and personally performed the physical exam and medical decision making.  I agree with the assessment and plan of care as documented in the note.      Key findings: In  summary, Mona Wagner is a  at 25w5d admitted due to post-nephrostomy tube placement AT with electrolyte abnormalities and  contractions.  Clinical status has improved but still needing ongoing inpatient management for fluid administration due to large volume diuresis and close monitoring of electrolytes.  As fetal status appears improved/stable, will plan to decrease to TID EFM at this time.      35 MINUTES SPENT BY ME on the date of service doing chart review, history, exam, documentation & further activities per the note.    I have personally reviewed the following data over the past 24 hrs:    N/A  \   N/A   / N/A     138 103 26.9 (H) /  122 (H)   5.0 25 1.29 (H) \       ALT: 11 AST: 17 AP: 81 TBILI: 0.4   ALB: 3.8 TOT PROTEIN: 7.2 LIPASE: N/A         Tiffany Montiel MD  Date of Service (when I saw the patient): 23

## 2023-06-14 NOTE — PLAN OF CARE
Mona able to rest over night. VSS, afebrile. Denies any HA, blurry vision, N/V, RUQ pain, SOB, chest pain, fever/chills, cramping/ctx, vaginal bleeding or leaking of fluid. IV flushed. See FS for EFM interpretation. I&O appropriate. Pt reoriented to call light, and reviewed/encouraged patient to notify staff of any changes in condition.

## 2023-06-14 NOTE — PLAN OF CARE
Goal Outcome Evaluation:  Pt states is comfortable, denies feeling ctx's, leaking or bleeding. No ctx's on monitor, baby AGA. Saline locked flushed. Shower linen changed. Nephrostomy dressing was changed; clear urine.  at bedside supportive. No complains.

## 2023-06-14 NOTE — PLAN OF CARE
Goal Outcome Evaluation:       Pt in stable condition this shift.  Able to sleep in this morning until 0930.  After she woke up, she reported feeling dehydrated and intended to PO hydrate.  On monitor from [id number], contractions noted on toco that pt reported not feeling.  Dr Montiel notified, I/Os reviewed and noted to be 500 more out than in at that time.  500ml NS fluid bolus ordered and administered, okay to come off monitor per Dr Montiel and order changed from EFM QID to EFM TID since tracings are appropriate for GA.  Dr Montiel, Dr Barton, Dr Muniz in to round on patient at 1305, discussed plan of care including pt staying inpatient tonight to make sure she stays hydrated.  Plan to continue with fasting and one hour post meal BGs, continue to monitor I/Os closely.

## 2023-06-15 LAB
ALBUMIN MFR UR ELPH: 17 MG/DL
ALBUMIN SERPL BCG-MCNC: 4 G/DL (ref 3.5–5.2)
ALP SERPL-CCNC: 83 U/L (ref 35–104)
ALT SERPL W P-5'-P-CCNC: 11 U/L (ref 0–50)
ANION GAP SERPL CALCULATED.3IONS-SCNC: 13 MMOL/L (ref 7–15)
AST SERPL W P-5'-P-CCNC: 16 U/L (ref 0–45)
BILIRUB SERPL-MCNC: 0.5 MG/DL
BUN SERPL-MCNC: 27.6 MG/DL (ref 6–20)
CALCIUM SERPL-MCNC: 9.6 MG/DL (ref 8.6–10)
CHLORIDE SERPL-SCNC: 100 MMOL/L (ref 98–107)
CREAT SERPL-MCNC: 1.25 MG/DL (ref 0.51–0.95)
CREAT UR-MCNC: 25.1 MG/DL
DEPRECATED HCO3 PLAS-SCNC: 20 MMOL/L (ref 22–29)
GFR SERPL CREATININE-BSD FRML MDRD: 59 ML/MIN/1.73M2
GLUCOSE BLDC GLUCOMTR-MCNC: 93 MG/DL (ref 70–99)
GLUCOSE SERPL-MCNC: 138 MG/DL (ref 70–99)
MAGNESIUM SERPL-MCNC: 1.3 MG/DL (ref 1.7–2.3)
PHOSPHATE SERPL-MCNC: 3.6 MG/DL (ref 2.5–4.5)
POTASSIUM SERPL-SCNC: 4.8 MMOL/L (ref 3.4–5.3)
PROT SERPL-MCNC: 7.2 G/DL (ref 6.4–8.3)
PROT/CREAT 24H UR: 0.68 MG/MG CR (ref 0–0.2)
SODIUM SERPL-SCNC: 133 MMOL/L (ref 136–145)
TACROLIMUS BLD-MCNC: 5.7 UG/L (ref 5–15)
TME LAST DOSE: NORMAL H
TME LAST DOSE: NORMAL H

## 2023-06-15 PROCEDURE — 999N000040 HC STATISTIC CONSULT NO CHARGE VASC ACCESS

## 2023-06-15 PROCEDURE — 250N000011 HC RX IP 250 OP 636: Performed by: STUDENT IN AN ORGANIZED HEALTH CARE EDUCATION/TRAINING PROGRAM

## 2023-06-15 PROCEDURE — 120N000002 HC R&B MED SURG/OB UMMC

## 2023-06-15 PROCEDURE — 999N000127 HC STATISTIC PERIPHERAL IV START W US GUIDANCE

## 2023-06-15 PROCEDURE — 99232 SBSQ HOSP IP/OBS MODERATE 35: CPT | Mod: 25 | Performed by: OBSTETRICS & GYNECOLOGY

## 2023-06-15 PROCEDURE — 59025 FETAL NON-STRESS TEST: CPT | Mod: 26 | Performed by: OBSTETRICS & GYNECOLOGY

## 2023-06-15 PROCEDURE — 84100 ASSAY OF PHOSPHORUS: CPT

## 2023-06-15 PROCEDURE — 250N000012 HC RX MED GY IP 250 OP 636 PS 637

## 2023-06-15 PROCEDURE — 999N000007 HC SITE CHECK

## 2023-06-15 PROCEDURE — 250N000013 HC RX MED GY IP 250 OP 250 PS 637: Performed by: STUDENT IN AN ORGANIZED HEALTH CARE EDUCATION/TRAINING PROGRAM

## 2023-06-15 PROCEDURE — 258N000003 HC RX IP 258 OP 636

## 2023-06-15 PROCEDURE — 82310 ASSAY OF CALCIUM: CPT

## 2023-06-15 PROCEDURE — 84156 ASSAY OF PROTEIN URINE: CPT | Performed by: STUDENT IN AN ORGANIZED HEALTH CARE EDUCATION/TRAINING PROGRAM

## 2023-06-15 PROCEDURE — 83735 ASSAY OF MAGNESIUM: CPT

## 2023-06-15 PROCEDURE — 250N000013 HC RX MED GY IP 250 OP 250 PS 637

## 2023-06-15 PROCEDURE — 80197 ASSAY OF TACROLIMUS: CPT

## 2023-06-15 PROCEDURE — 250N000012 HC RX MED GY IP 250 OP 636 PS 637: Performed by: STUDENT IN AN ORGANIZED HEALTH CARE EDUCATION/TRAINING PROGRAM

## 2023-06-15 PROCEDURE — 99223 1ST HOSP IP/OBS HIGH 75: CPT | Mod: FS

## 2023-06-15 PROCEDURE — 36415 COLL VENOUS BLD VENIPUNCTURE: CPT

## 2023-06-15 RX ORDER — MAGNESIUM OXIDE 400 MG/1
800 TABLET ORAL 2 TIMES DAILY
Status: DISCONTINUED | OUTPATIENT
Start: 2023-06-16 | End: 2023-06-16 | Stop reason: HOSPADM

## 2023-06-15 RX ORDER — MAGNESIUM SULFATE HEPTAHYDRATE 40 MG/ML
2 INJECTION, SOLUTION INTRAVENOUS ONCE
Status: COMPLETED | OUTPATIENT
Start: 2023-06-15 | End: 2023-06-15

## 2023-06-15 RX ORDER — SIMETHICONE 80 MG
80 TABLET,CHEWABLE ORAL EVERY 6 HOURS PRN
Status: DISCONTINUED | OUTPATIENT
Start: 2023-06-15 | End: 2023-06-16 | Stop reason: HOSPADM

## 2023-06-15 RX ADMIN — AZATHIOPRINE 150 MG: 50 TABLET ORAL at 22:17

## 2023-06-15 RX ADMIN — CYANOCOBALAMIN TAB 1000 MCG 1000 MCG: 1000 TAB at 08:25

## 2023-06-15 RX ADMIN — FAMOTIDINE 20 MG: 20 TABLET ORAL at 12:56

## 2023-06-15 RX ADMIN — Medication 37.5 MCG: at 08:25

## 2023-06-15 RX ADMIN — TACROLIMUS 5 MG: 5 CAPSULE ORAL at 16:06

## 2023-06-15 RX ADMIN — TACROLIMUS 2 MG: 1 CAPSULE ORAL at 16:06

## 2023-06-15 RX ADMIN — SIMETHICONE 80 MG: 80 TABLET, CHEWABLE ORAL at 18:56

## 2023-06-15 RX ADMIN — SODIUM BICARBONATE 650 MG TABLET 650 MG: at 22:13

## 2023-06-15 RX ADMIN — LACTASE TAB 3000 UNIT 6000 UNITS: 3000 TAB at 11:19

## 2023-06-15 RX ADMIN — TACROLIMUS 2 MG: 1 CAPSULE ORAL at 22:14

## 2023-06-15 RX ADMIN — MAGNESIUM OXIDE TAB 400 MG (241.3 MG ELEMENTAL MG) 400 MG: 400 (241.3 MG) TAB at 22:14

## 2023-06-15 RX ADMIN — TACROLIMUS 5 MG: 5 CAPSULE ORAL at 22:14

## 2023-06-15 RX ADMIN — MAGNESIUM SULFATE HEPTAHYDRATE 2 G: 40 INJECTION, SOLUTION INTRAVENOUS at 16:31

## 2023-06-15 RX ADMIN — PRENATAL VITAMINS-IRON FUMARATE 27 MG IRON-FOLIC ACID 0.8 MG TABLET 1 TABLET: at 22:13

## 2023-06-15 RX ADMIN — SODIUM CHLORIDE 500 ML: 9 INJECTION, SOLUTION INTRAVENOUS at 04:45

## 2023-06-15 RX ADMIN — CALCIUM CARBONATE 1250 MG: 1250 SUSPENSION ORAL at 18:55

## 2023-06-15 RX ADMIN — CALCIUM CARBONATE 1250 MG: 1250 SUSPENSION ORAL at 10:01

## 2023-06-15 RX ADMIN — CALCITRIOL CAPSULES 0.25 MCG 0.25 MCG: 0.25 CAPSULE ORAL at 10:00

## 2023-06-15 ASSESSMENT — ACTIVITIES OF DAILY LIVING (ADL)
ADLS_ACUITY_SCORE: 20

## 2023-06-15 NOTE — CONSULTS
[facility]  Transplant Nephrology Consult  Date of Admission:  6/11/2023  Today's Date: 06/15/2023  Requesting physician: Tiffany Montiel MD    Recommendations:  - Give 2g magnesium sulfate IV once.  - Increase magnesium oxide to 800 mg PO BID.  - Repeat urine protein/creatinine ratio.  - Check CBC tomorrow.  -  Recommend labs twice weekly after discharge.  - Follow up with transplant nephrology a week after discharge.      Assessment & Plan   # DDKT: Stable kidney function during 1st trimester of pregnancy. 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    - Baseline Cr ~ 0.8-1.1   - Proteinuria: Moderate (1-3 grams)   - Date DSA Last Checked: Sep/2022      Latest DSA: No   - BK Viremia: No Aug 2021   - Kidney Tx Biopsy: Sep 17, 2019; Result: No diagnostic evidence of acute rejection.   - nephrostomy tube placed 6/9/23 d/t hydronephrosis.   - 6/11 renal ultrasound: No significant change in the moderate hydroureteronephrosis compared to 5/31/2023      # Immunosuppression: Tacrolimus immediate release (goal 4-6) and Azathioprine (dose 150 mg daily)    - Continue with intensive monitoring of immunosuppression for efficacy and toxicity   - 6/15 Tacrolimus level pending   - Changes: No    # Infection Prophylaxis:   Last CD4 Level: 201 ( July 2020)   - PJP: None    # Orthostatic Hypotension: Controlled, but low at times on Florinef 0.1mg MWF prior to pregnancy. Now on hold;  Goal BP: > 100, but < 130 systolic   - Changes: No. Class C during pregnancy so would try to hold if she could tolerate     # Anemia in Chronic Renal Disease: Hgb: Stable, now normal      CHARLINE: Yes, due for next dose of Aranesp 6/20.   - Iron studies: Replete 5/22/23    # Mineral Bone Disorder:   - Secondary renal hyperparathyroidism; PTH level:  (see below)  s/p parathyroidectomy 4/28/23.       On treatment: Calcitriol 0.25 mcg PO daily   04/28/23 10:25 04/29/23 05:52   PTH 13 (L) 3  (L)     - Vitamin D; level: Not checked recently        On supplement: No  - Calcium; level: Normal 9.6       On supplement: Yes, calcium carbonate 1250 mg PO BID.  - Phosphorus; level: Normal        On supplement: No    # Electrolytes:   - Potassium; level: Normal       On supplement: No  - Magnesium; level: Low 1.3       On supplement: Yes, Give 2g magnesium sulfate IV once and increase magnesium oxide to 800 mg PO BID. BID  - Bicarbonate; level: Low  20 (acceptable)      On supplement: Yes, sodium bicarbonate 650 mg PO BID  - Sodium; level: Low 133      # Ureteral stenosis:   -  Stent removed Feb 2021. Follows with urology . Trying to avoid further stents due to recurrent UTIs              -  Allograft US July 2021: moderate hydronephrosis unchanged with voiding. Unchanged on 7/6/22 abdominal CT       # Hyperprolactinemia: Normal prolactin level with last check (7/13/23).  Felt secondary to hypothyroidism versus kidney failure, or less likely now a pituitary microadenoma.  Followed by Endocrinology.    # Second trimester of pregnancy:    - Switched from MPA to AZA prior to conception               - Patient will continue aspirin 81 mg daily.                 - Patient will have to hold atorvastatin, sensipar, topical minoxidil and florinef during pregnancy                # R Axillary node:   -Referred to general surgery for excisional biopsy in 8/2022 but decision was made to watch the node. Recommend excisional biopsy post partum.     # Transplant History:  Etiology of Kidney Failure: Unknown etiology (no kidney biopsy)  Tx: DDKT  Transplant: 8/3/2019 (Kidney)  Donor Type: Donation after Circulatory Death  Donor Class: Standard Criteria Donor  Crossmatch at time of Tx: negative  DSA at time of Tx: No  Significant changes in immunosuppression: None  Significant transplant-related complications: None    Recommendations were communicated to the primary team verbally.    Seen and discussed with Dr. Adams.    Steffen  ALCDIES Kirk CNP  Pager: [phone]        Physician Attestation     I saw and evaluated Mona Wagner as part of a shared APRN/PA visit.     I personally reviewed the vital signs, medications, labs, and imaging.    I personally performed the substantive portion of the medical decision making for this visit - please see the MAR's documentation for full details.    Key management decisions made by me and carried out under my direction: 31 yo female ~25 week pregnant with ESKD of unclear etiology s/p DDKTx  c/b ureteral stricture and hydronephrosis s/p ureteral stent placement then removal 2021 with persistent moderate hydronephrosis, b/l Cr~ 0.8-1 mg/dl following pregnancy (prior b/l ~1.1-1.2), maintained on tacrolimus/AZA, recurrent UTIs, tertiary hyperparathyroidism s/p partial parathyroidectomy 2023 admitted for CAMERON, hyperK, and concern for  labor s/p perc neph  with post obstructive diuresis and improving renal function Cr down to 1.2 and resolution of hyperK. BP fairly controlled, UPC ~1.5g/g unclear accuracy, recommend repeating. Agree with IVF, Continue tacrolimus (goal 4-6) , continue sodium bicarbonate and replete Mg. 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 She was recently admitted -23 for rule out severe preeclampsia after presenting with elevated blood pressures in clinic.  She also has a history of FATOUMATA, DV, bacterial endocarditis, hypothyroidism, hyperprolactinemia, tertiary hyperparathyroidism s/p parathyroidectomy 23..  She is approx 25 weeks pregnant and presented  for increased vaginal discharge and cramping contractions. She was found to have  significant hyperkalemia.  Her creatinine today of 1.25 is above baseline of ~ 0.8-1.0, but is improving.        Urine output in the last three completed shifts has been 4.5L.  Post ATN diuresis should start to improve in a few days.  UPCR elevated.     05/30/23 08:45 06/08/23 10:09 06/12/23 04:18   Total Protein UR MG/MG CR 0.08 0.08 1.65 (H)       reports feeling well.  No SOB on room air.  Denies N/V/D.  Denies edema.    Review of Systems    The 10 point Review of Systems is negative other than noted in the HPI or here.     Past Medical History    I have reviewed this patient's medical history and updated it with pertinent information if needed.   Past Medical History:   Diagnosis Date    Anemia in chronic kidney disease     History of bacterial endocarditis     History of blood transfusion     History of DVT (deep vein thrombosis) 2014    History of seizure 2014    History of subarachnoid hemorrhage     Hydronephrosis     Hypothyroidism     Kidney transplanted 08/03/2019    DCD DDKT. Induction with thymo 6mg/kg.    Orthostatic hypotension     FATOUMATA (obstructive sleep apnea)     Pyelonephritis of transplanted kidney 01/23/2020    Secondary renal hyperparathyroidism (H)     Urinary tract infection        Past Surgical History   I have reviewed this patient's surgical history and updated it with pertinent information if needed.  Past Surgical History:   Procedure Laterality Date    BENCH KIDNEY N/A 8/3/2019    Procedure: BACKBENCH PREPARATION, ALLOGRAFT, KIDNEY;  Surgeon: Dorian Johnson MD;  Location: [facility] OR    COMBINED CYSTOSCOPY, RETROGRADES, EXCHANGE STENT URETER(S) Right 11/23/2020    Procedure: CYSTOSCOPY, CYSOTGRAM, WITH RETROGRADE PYELOGRAM, ureteroscopy,  AND URETERAL STENT;  Surgeon: Wally Britt MD;  Location: [facility] OR    COMBINED CYSTOSCOPY, RETROGRADES, URETEROSCOPY, LASER HOLMIUM LITHOTRIPSY URETER(S), INSERT STENT N/A 12/6/2019    Procedure: CYSTOURETEROSCOPY, WITH RETROGRADE PYELOGRAM  of transplant kidney, STENT INSERTION, Laser on standby;  Surgeon: Wally Britt MD;  Location: [facility] OR    CREATE FISTULA ARTERIOVENOUS UPPER EXTREMITY  2014    Procedure: CREATE FISTULA ARTERIOVENOUS UPPER EXTREMITY;  Left Wrist Arteriovenous Fistula Placement;  Surgeon: Shashi Castro MD;  Location: [facility] OR    CREATE FISTULA ARTERIOVENOUS UPPER EXTREMITY      CYSTOSCOPY, RETROGRADES, INSERT STENT URETER(S), COMBINED N/A 2020    Procedure: CYSTOSCOPY, WITH RETROGRADE PYELOGRAM, CYSTOGRAM AND URETERAL STENT INSERTION - TRANSPLANT KIDNEY;  Surgeon: Wally Britt MD;  Location: [facility] OR    IR CEREBRAL ANGIOGRAM  2014    IR NEPHROSTOMY TUBE PLACEMENT RIGHT  2023    PARATHYROIDECTOMY Bilateral 2023    Procedure: Bilateral Resection of 3 and 1/2 parathyroid glands;  Surgeon: Melissa Jones MD;  Location: [facility] OR    PERCUTANEOUS BIOPSY KIDNEY Right 2019    Procedure: Right Kidney Biopsy;  Surgeon: Deny Rios MD;  Location: [facility]    RASTA/DIALYSIS CATHETER  12/10/2013         TRANSPLANT KIDNEY RECIPIENT  DONOR N/A 8/3/2019    Procedure: TRANSPLANT, KIDNEY, RECIPIENT,  DONOR with Ureteral Stent Placement;  Surgeon: Dorian Johnson MD;  Location: [facility] OR       Family History   I have reviewed this patient's family history and updated it with pertinent information if needed.   Family History   Problem Relation Age of Onset    Kidney Disease Mother     Kidney failure Mother     Coronary Artery Disease Father     Cerebrovascular Disease Father     Heart Disease Father     Sleep Apnea Father     Hypertension Sister     Diabetes Sister     Cerebrovascular Disease Sister     Kidney Disease Sister     Breast Cancer No family hx of     Cancer - colorectal No family hx of     Ovarian Cancer No family hx of     Prostate Cancer No family hx of     Other Cancer No family hx of     Asthma No family hx of     Anesthesia Reaction No family hx of     Deep Vein  Thrombosis (DVT) No family hx of     Melanoma No family hx of     Skin Cancer No family hx of     Thrombosis No family hx of        Social History   I have reviewed this patient's social history and updated it with pertinent information if needed. Mona Wagner  reports that she has never smoked. She has never used smokeless tobacco. She reports that she does not drink alcohol and does not use drugs.    Allergies   Allergies   Allergen Reactions    Contrast Dye Rash     CT contrast allergy 19 rash over eyes. Need to have pre medication before a CT WITH CONTRAST     Diatrizoate Rash     CT contrast allergy 19 rash over eyes. Need to have pre medication before a CT WITH CONTRAST     Lisinopril Swelling     angioedema    Nitrous Oxide Other (See Comments)     Sense of doom    Chlorhexidine Rash     Rash at site    Sulfa Antibiotics Rash     Muscle stiffness of neck    Dapsone      Methemoglobinemia    Furosemide Other (See Comments)     Skin flushing    Metrogel [Metronidazole]      Hives diffusely on body after vaginal administration.    Azithromycin Dizziness and Rash    Cefuroxime Rash     Prior to Admission Medications    azaTHIOprine  150 mg Oral QPM    calcitRIOL  0.25 mcg Oral Daily    calcium carbonate  1,250 mg Oral BID w/meals    cyanocobalamin  1,000 mcg Oral Daily    levothyroxine  37.5 mcg Oral Daily    magnesium oxide  400 mg Oral BID    prenatal multivitamin w/iron  1 tablet Oral QPM    sodium bicarbonate  650 mg Oral BID    sodium chloride (PF)  3 mL Intracatheter Q8H    tacrolimus  2 mg Oral BID    tacrolimus  5 mg Oral BID      - MEDICATION INSTRUCTIONS -         Physical Exam   Temp  Av.2  F (36.8  C)  Min: 97.5  F (36.4  C)  Max: 98.8  F (37.1  C)      Pulse  Av.5  Min: 74  Max: 98 Resp  Av.1  Min: 16  Max: 29  SpO2  Av.6 %  Min: 96 %  Max: 100 %     /75   Pulse 82   Temp 98.1  F (36.7  C) (Oral)   Resp 16   Wt 77.9 kg (171 lb 12.8 oz)   LMP 2022   SpO2  99%   BMI 33.55 kg/m     Date 06/15/23 0700 - 06/16/23 0659   Shift 5741-6197 9829-7468 7722-9296 24 Hour Total   INTAKE   P.O. 1300   1300   Shift Total(mL/kg) 1300(16.68)   1300(16.68)   OUTPUT   Urine 750   750   Shift Total(mL/kg) 750(9.62)   750(9.62)   Weight (kg) 77.93 77.93 77.93 77.93      Admit Weight: 79.4 kg (175 lb)     GENERAL APPEARANCE: alert and no distress  RESP: lungs clear to auscultation - no rales, rhonchi or wheezes  CV: regular rhythm, normal rate, no rub, no murmur  EDEMA: no LE edema bilaterally  ABDOMEN: pregnant  MS: extremities normal - no gross deformities noted, no evidence of inflammation in joints, no muscle tenderness  SKIN: no rash  PSYCH: mentation appears normal and affect normal/bright  DIALYSIS ACCESS:  LUE AV fistula +bruit/+thrill    Data   CMP  Recent Labs   Lab 06/15/23  1009 06/15/23  0703 06/14/23  1945 06/14/23  1536 06/14/23  1135 06/14/23  0950 06/14/23  0604 06/13/23  2224 06/13/23  0649 06/12/23  1015 06/12/23  1003 06/12/23  0640 06/12/23  0235 06/11/23  1842 06/11/23  1614   *  --   --   --   --  138  --  138 136   < > 137  --  133*   < > 133*   POTASSIUM 4.8  --   --   --   --  5.0  --  4.6 5.0   < > 4.9  4.9  --  5.7*   < > 5.7*   CHLORIDE 100  --   --   --   --  103  --  103 102   < > 106  --  103  --  102   CO2 20*  --   --   --   --  25  --  24 22   < > 14*  --  17*  --  22   ANIONGAP 13  --   --   --   --  10  --  11 12   < > 17*  --  13  --  9   * 93 103* 122*   < > 95   < > 128* 190*   < > 123*   < > 119*  --  93   BUN 27.6*  --   --   --   --  26.9*  --  29.6* 26.9*   < > 24.1*  --  26.1*  --  28.5*   CR 1.25*  --   --   --   --  1.29*  --  1.49* 1.28*   < > 1.22*  --  1.30*  --  1.57*   GFRESTIMATED 59*  --   --   --   --  57*  --  48* 58*   < > 61  --  56*  --  45*   SHAMIR 9.6  --   --   --   --  9.5  --  9.2 8.9   < > 9.4  --  9.8  --  9.8   MAG  --   --   --   --   --   --   --   --   --   --  2.3  --  1.6*  --  1.5*   PHOS  --   --   --    --   --   --   --   --   --   --   --   --  2.6  --  3.3   PROTTOTAL 7.2  --   --   --   --  7.2  --  6.8 6.4   < >  --   --   --   --  7.6   ALBUMIN 4.0  --   --   --   --  3.8  --  3.5 3.4*   < >  --   --   --   --  4.0   BILITOTAL 0.5  --   --   --   --  0.4  --  0.2 0.4   < >  --   --   --   --  0.5   ALKPHOS 83  --   --   --   --  81  --  77 78   < >  --   --   --   --  85   AST 16  --   --   --   --  17  --  15 11   < >  --   --  12  --  11   ALT 11  --   --   --   --  11  --  12 8*   < >  --   --  9*  --  9*    < > = values in this interval not displayed.     CBC  Recent Labs   Lab 06/12/23  1003 06/12/23  0432 06/11/23  1614 06/09/23  0659   HGB 9.8* 10.8* 9.9* 7.9*   WBC 18.9* 17.5* 13.0* 7.6   RBC 3.14* 3.44* 3.18* 2.58*   HCT 29.9* 32.2* 30.1* 24.3*   MCV 95 94 95 94   MCH 31.2 31.4 31.1 30.6   MCHC 32.8 33.5 32.9 32.5   RDW 13.6 13.5 13.6 13.3    256 223 168     INR  Recent Labs   Lab 06/12/23  1003 06/09/23  0700   INR 1.06 0.99   PTT 31  --      ABG  Recent Labs   Lab 06/13/23  0649   O2PER 21      Urine Studies  Recent Labs   Lab Test 06/11/23  1532 03/29/23  1257 03/15/23  0952 07/11/22  1430 06/22/22  0811 06/15/22  1145 02/26/21  1210 02/26/21  1143   COLOR Straw Light Yellow Straw Light Yellow   < > Yellow   < > Yellow   APPEARANCE Clear Clear Clear Clear   < > Clear   < > Clear   URINEGLC Negative Negative Negative Negative   < > Negative   < > Negative   URINEBILI Negative Negative Negative Negative   < > Negative   < > Negative   URINEKETONE Negative Negative Negative Negative   < > Negative   < > Negative   SG 1.006 1.010 1.003 1.009   < > 1.010   < > 1.020   UBLD Large* Negative Negative Negative   < > Negative   < > Large*   URINEPH 7.5* 6.0 6.5 5.5   < > 6.0   < > 6.5   PROTEIN 30* Negative Negative Negative   < > Negative   < > 100*   UROBILINOGEN  --   --   --   --   --  0.2  --  0.2   NITRITE Negative Negative Negative Negative   < > Negative   < > Negative   LEUKEST Small*  Negative Negative Negative   < > Negative   < > Small*   RBCU 151* 0 1 1   < > None Seen   < >  --    WBCU 27* 1 0 1   < > 0-5   < >  --     < > = values in this interval not displayed.     Recent Labs   Lab Test 02/09/21  0922 02/02/21  0920 08/04/20  0905 04/29/20  0850 02/03/20  1035 12/03/19  1215 09/03/19  0940   UTPG 0.28* 0.38* Unable to calculate due to low value Unable to calculate due to low value 0.13 0.15 0.24*     PTH  Recent Labs   Lab Test 04/29/23  0552 04/28/23  1025 02/06/23  1023 01/18/23  0949 04/01/22  1000 03/03/21  0912 08/04/20  0905 12/03/19  1112 08/09/19  0559   PTHI 3* 13* 59 79* 365* 251* 229* 210* 358*     Iron Studies  Recent Labs   Lab Test 05/22/23  1023 05/22/23  1022 04/25/23  1035 07/13/22  1220 08/04/20  0905 02/17/20  0850 12/03/19  1112 09/03/19  0944 08/30/19  1700 08/12/19  0619 01/20/19  0603 12/29/16  1338   IRON  --  80 49 51 64 95 47 87 138 96 43 137   FEB  --  195* 181* 217* 198* 225* 200* 254 270 162*  --  228*   IRONSAT  --  41 27 24 32 42 23 34 51* 59*  --  60*   MONET 1,618*  --  1,770* 1,111* 1,065* 1,139* 1,003* 1,535* 1,746*  --  1,780* 1,039*       IMAGING:  All imaging studies reviewed by me.

## 2023-06-15 NOTE — PLAN OF CARE
Goal Outcome Evaluation:  Pt states is comfortable, no complains this shift. Baby AGA on monitor, no ctx's noted. Denies leaking or bleeding. Saline locked flushed. Nephrostomy patent with clear urine. Strict I/O daily weights.

## 2023-06-15 NOTE — PROGRESS NOTES
Antepartum Progress Note    S: Patient feeling well this morning and enjoying a home cooked meal. PNT tube continues to have high output. Patient has felt contractions intermittently but states they do not feel as intense as the contractions she felt on admission. Denies LOF, bleeding.     States that she feels dehydrated in the morning but otherwise does not feel exceedingly thirsty during the day. Wondering how long her PNT will have high output and when she can discharge from the hospital. Expressed concern about scheduling outpatient IVF if she is discharged.     O:   Patient Vitals for the past 24 hrs:   BP Temp Temp src Pulse Resp   06/15/23 1000 118/75 98.1  F (36.7  C) Oral 82 --   06/15/23 0630 125/76 98.4  F (36.9  C) Oral -- 16   06/15/23 0220 137/88 98.2  F (36.8  C) Oral -- 16   06/14/23 2229 (!) 141/75 98.3  F (36.8  C) Oral -- 24 06/14/23 1812 131/80 98.1  F (36.7  C) Oral -- 24   06/14/23 1412 -- 97.9  F (36.6  C) Oral -- 18   06/14/23 1411 137/84 -- -- -- --     Gen: Sitting up on sofa, NAD  CV:  Warm, well perfused   Pulm: Non-labored breathing  Abd: Gravid, non-tender   Ext: Trace swelling, no rash noted on hands or feet     Weight:   Wt Readings from Last 2 Encounters:   06/14/23 79.2 kg (174 lb 11.2 oz)   06/09/23 (P) 81.1 kg (178 lb 12.8 oz)       FHT: 145 baseline, moderate variability, + accels, no decelerations  TOCO: 3 in 10 contractions, not noted by patient    I/Os:     Intake/Output Summary (Last 24 hours) at 6/14/2023 1327  Last data filed at 6/14/2023 1312  Gross per 24 hour   Intake 4115 ml   Output 5665 ml   Net -1550 ml       Labs:     Recent Labs   Lab 06/15/23  0703 06/14/23  1945 06/14/23  1536 06/14/23  1135 06/14/23  0950 06/14/23  0604   GLC 93 103* 122* 115* 95 116*      Latest Reference Range & Units 06/15/23 10:09   Sodium 136 - 145 mmol/L 133 (L)   Potassium 3.4 - 5.3 mmol/L 4.8   Chloride 98 - 107 mmol/L 100   Carbon Dioxide (CO2) 22 - 29 mmol/L 20 (L)   Urea Nitrogen  6.0 - 20.0 mg/dL 27.6 (H)   Creatinine 0.51 - 0.95 mg/dL 1.25 (H)   GFR Estimate >60 mL/min/1.73m2 59 (L)   Calcium 8.6 - 10.0 mg/dL 9.6   Anion Gap 7 - 15 mmol/L 13   Albumin 3.5 - 5.2 g/dL 4.0   Protein Total 6.4 - 8.3 g/dL 7.2   Alkaline Phosphatase 35 - 104 U/L 83   ALT 0 - 50 U/L 11   AST 0 - 45 U/L 16   Bilirubin Total <=1.2 mg/dL 0.5   Glucose 70 - 99 mg/dL 138 (H)   (L): Data is abnormally low  (H): Data is abnormally high    Tacro level:  PM 6.2    Imaging:   No new imaging today    A/P: Mona Wagner is a 32 year old  female at 25w6d by 8w4d US, HD#5 admitted for hyperkalemia in the setting of a renal transplant s/p percutaneous nephrostomy tube placement for ureteral stricture and likely obstruction, as well as rule out  labor. Her electrolyte abnormalities are likely post obstructive diuresis. Appreciate nephrology assistance managing fluids and electrolytes. Patient continues to have intermittent contractions and notes that she has started to feel them; however, states that the contractions are not the same intensity as the contractions she experienced on admission. Will plan to continue to give 500 NS boluses if patient starts experiencing cramping, increased frequency of contractions or PNT continues to have significantly high output. Will consult Nephrology to assist with anticipated needs for follow up and need for continued inpatient monitoring.     Plan for 6/15  - Nephrology Consult  -  NS bolus (if cramping, increasing frequency of contractions or PNT continues to have significantly high output)    Hx renal transplant  2/2 IgA nephropathy  Allograft hydronephrosis d/t ureteral stenosis  S/p percutaneous nephrostomy placement   Hyperkalemia, currently resolved  Hypomagnesemia, currently resolved     Suspected Post-ATN Diuresis   - S/p Lokelma (use if K >5.5), s/p insulin/D50  - S/p D5W 1000mL with sodium bicarb 150 mEq/L infusion   - ICU admission and telemetry if K >  5.9  -  NS bolus (administer if experiencing cramping or PNT continues to have high output)  - PTA Tacrolimus, Azathioprine 7mg BID   -Last tacro level was just slightly above goal at 6.2 (Goal 4-6)  - Renal US: unchanged moderate hydro, percutaneous nephrostomy in place  - Is followed by nephrology, appreciate recommendations     Rule out  labor   Cervical change from c/l/h on admission to 1/50/H overnight two nights ago. No new obstetric concerns.  - S/p BMZ course (-)  - S/p magnesium load    cHTN   Goal BP <130/80. Reviewed blood pressures with transplant nephrology team. Discussed a prn plan for antihypertensives. Per this discussion, would be okay to start nifedipine or amlodipine for antihypertensive control. Specifically, no concern from that team about interaction with tacrolimus. Favor amlodipine 2.5 mg daily as this is overall a smaller dose than the initial nifed dose, in an effort to not drastically decrease blood pressures. Previously, had been started on labetalol but experience nipple tenderness with this medication and it was discontinued. Plan for now is to continue to monitor BP. If >50% above goal, will plan to take orthostatic BP readings and possibly start amlodipine 2.5 mg daily.     Hypothyroidism  Secondary Hyperparathyroidism with hypercalcemia s/p parathyroidectomy  - S/p resection of parathyroid glands on 23  - Follows with endo. Continue current levothyroxine prescription 37.5 mcg daily.  - TSH 2.28 on 23, free T4 1.0 and free T3 2.2  - Per endo recheck labs monthly     PNC  - Rh +, Rubella and Varicella non-immune, HepB/HIV/RPR NR, GCT passed, Hgb 8.5, Plts 188  - Other prenatal labs wnl  - Imaging: placenta anterior, comp US  EFW 15%, AC 20% (662g)    FWB  Overall appropriate for gestational age   - TID monitoring   - s/p NNP consult   - s/p classical CS & blood transfusion consent    Delivery Plan: as clinically indicated, no indication at this time.      Patient seen and discussed with Dr. Tiana Melo, MS4  OBGYN M      Physician Attestation   I saw this patient with the resident and agree with the resident/fellow's findings and plan of care as documented in the note.      Key findings:  In summary, Mona Wagner is a  at 25w6d admitted due to post-nephrostomy tube placement AT with electrolyte abnormalities.  Clinical status has improved but still needing ongoing inpatient management for fluid administration due to large volume diuresis and close monitoring of electrolytes.  No obstetric concerns identified.     30 MINUTES SPENT BY ME on the date of service doing chart review, history, exam, documentation & further activities per the note.    I have personally reviewed the following data over the past 24 hrs:    N/A  \   N/A   / N/A     133 (L) 100 27.6 (H) /  138 (H)   4.8 20 (L) 1.25 (H) \       ALT: 11 AST: 16 AP: 83 TBILI: 0.5   ALB: 4.0 TOT PROTEIN: 7.2 LIPASE: N/A         Tiffany Montiel MD  Date of Service (when I saw the patient): 06/15/23

## 2023-06-15 NOTE — PLAN OF CARE
Pt feels wells this morning. Wants to go back to sleep after morning meds. Expecting family to visit this morning. Pt to call with needs.

## 2023-06-15 NOTE — PLAN OF CARE
Data: Maternal status vital signs stable. Afebrile. Signs and symptoms of infection not present. Denies any leaking of fluids, vaginal bleeding, and or painful contractions. Patient denies any visual changes, headaches, or epigastric pain. See flow sheets for details on fetal heart rate tracings and uterine activity.   Action/interventions: Encourage voiding, hydration, and repositioning.   Response: Patient rested well through the night.   Plan: Continue expectant management. Observe for and notify care provider of indicators of progressing labor, signs/symptoms of infection, fetal/maternal compromise. Continue with plan of care. Report given to Cinthia WADSWORTH.

## 2023-06-16 ENCOUNTER — HOSPITAL ENCOUNTER (INPATIENT)
Facility: CLINIC | Age: 33
End: 2023-06-16
Admitting: OBSTETRICS & GYNECOLOGY
Payer: MEDICARE

## 2023-06-16 VITALS
DIASTOLIC BLOOD PRESSURE: 65 MMHG | TEMPERATURE: 98.1 F | OXYGEN SATURATION: 99 % | WEIGHT: 169.31 LBS | RESPIRATION RATE: 16 BRPM | SYSTOLIC BLOOD PRESSURE: 128 MMHG | BODY MASS INDEX: 33.07 KG/M2 | HEART RATE: 76 BPM

## 2023-06-16 DIAGNOSIS — O09.92 HIGH-RISK PREGNANCY IN SECOND TRIMESTER: Primary | ICD-10-CM

## 2023-06-16 LAB
ALBUMIN SERPL BCG-MCNC: 3.8 G/DL (ref 3.5–5.2)
ALP SERPL-CCNC: 84 U/L (ref 35–104)
ALT SERPL W P-5'-P-CCNC: 12 U/L (ref 0–50)
ANION GAP SERPL CALCULATED.3IONS-SCNC: 14 MMOL/L (ref 7–15)
AST SERPL W P-5'-P-CCNC: 14 U/L (ref 0–45)
BILIRUB SERPL-MCNC: 0.4 MG/DL
BUN SERPL-MCNC: 34.5 MG/DL (ref 6–20)
CALCIUM SERPL-MCNC: 9.9 MG/DL (ref 8.6–10)
CHLORIDE SERPL-SCNC: 102 MMOL/L (ref 98–107)
CREAT SERPL-MCNC: 1.19 MG/DL (ref 0.51–0.95)
DEPRECATED HCO3 PLAS-SCNC: 19 MMOL/L (ref 22–29)
ERYTHROCYTE [DISTWIDTH] IN BLOOD BY AUTOMATED COUNT: 13.8 % (ref 10–15)
GFR SERPL CREATININE-BSD FRML MDRD: 63 ML/MIN/1.73M2
GLUCOSE SERPL-MCNC: 114 MG/DL (ref 70–99)
HCT VFR BLD AUTO: 28.7 % (ref 35–47)
HGB BLD-MCNC: 9.2 G/DL (ref 11.7–15.7)
MAGNESIUM SERPL-MCNC: 1.6 MG/DL (ref 1.7–2.3)
MCH RBC QN AUTO: 31 PG (ref 26.5–33)
MCHC RBC AUTO-ENTMCNC: 32.1 G/DL (ref 31.5–36.5)
MCV RBC AUTO: 97 FL (ref 78–100)
PLATELET # BLD AUTO: 238 10E3/UL (ref 150–450)
POTASSIUM SERPL-SCNC: 5 MMOL/L (ref 3.4–5.3)
PROT SERPL-MCNC: 7.1 G/DL (ref 6.4–8.3)
RBC # BLD AUTO: 2.97 10E6/UL (ref 3.8–5.2)
SODIUM SERPL-SCNC: 135 MMOL/L (ref 136–145)
TSH SERPL DL<=0.005 MIU/L-ACNC: 2.51 UIU/ML (ref 0.3–4.2)
WBC # BLD AUTO: 13.2 10E3/UL (ref 4–11)

## 2023-06-16 PROCEDURE — 250N000012 HC RX MED GY IP 250 OP 636 PS 637

## 2023-06-16 PROCEDURE — 99239 HOSP IP/OBS DSCHRG MGMT >30: CPT | Mod: 25 | Performed by: OBSTETRICS & GYNECOLOGY

## 2023-06-16 PROCEDURE — 83735 ASSAY OF MAGNESIUM: CPT | Performed by: STUDENT IN AN ORGANIZED HEALTH CARE EDUCATION/TRAINING PROGRAM

## 2023-06-16 PROCEDURE — 83789 MASS SPECTROMETRY QUAL/QUAN: CPT | Performed by: STUDENT IN AN ORGANIZED HEALTH CARE EDUCATION/TRAINING PROGRAM

## 2023-06-16 PROCEDURE — 250N000013 HC RX MED GY IP 250 OP 250 PS 637: Performed by: STUDENT IN AN ORGANIZED HEALTH CARE EDUCATION/TRAINING PROGRAM

## 2023-06-16 PROCEDURE — 59025 FETAL NON-STRESS TEST: CPT | Mod: 26 | Performed by: OBSTETRICS & GYNECOLOGY

## 2023-06-16 PROCEDURE — 80053 COMPREHEN METABOLIC PANEL: CPT | Performed by: STUDENT IN AN ORGANIZED HEALTH CARE EDUCATION/TRAINING PROGRAM

## 2023-06-16 PROCEDURE — 36415 COLL VENOUS BLD VENIPUNCTURE: CPT | Performed by: STUDENT IN AN ORGANIZED HEALTH CARE EDUCATION/TRAINING PROGRAM

## 2023-06-16 PROCEDURE — 85014 HEMATOCRIT: CPT | Performed by: STUDENT IN AN ORGANIZED HEALTH CARE EDUCATION/TRAINING PROGRAM

## 2023-06-16 PROCEDURE — 84443 ASSAY THYROID STIM HORMONE: CPT

## 2023-06-16 RX ORDER — MAGNESIUM OXIDE 400 MG/1
800 TABLET ORAL 2 TIMES DAILY
Qty: 90 TABLET | Refills: 1 | Status: SHIPPED | OUTPATIENT
Start: 2023-06-16 | End: 2023-07-11

## 2023-06-16 RX ORDER — LEVOTHYROXINE SODIUM 50 UG/1
50 TABLET ORAL DAILY
Qty: 90 TABLET | Refills: 1 | Status: ON HOLD | OUTPATIENT
Start: 2023-06-16 | End: 2023-08-15

## 2023-06-16 RX ADMIN — Medication 37.5 MCG: at 09:24

## 2023-06-16 RX ADMIN — MAGNESIUM OXIDE TAB 400 MG (241.3 MG ELEMENTAL MG) 800 MG: 400 (241.3 MG) TAB at 10:03

## 2023-06-16 RX ADMIN — TACROLIMUS 5 MG: 5 CAPSULE ORAL at 10:02

## 2023-06-16 RX ADMIN — CALCITRIOL CAPSULES 0.25 MCG 0.25 MCG: 0.25 CAPSULE ORAL at 10:02

## 2023-06-16 RX ADMIN — CYANOCOBALAMIN TAB 1000 MCG 1000 MCG: 1000 TAB at 10:02

## 2023-06-16 RX ADMIN — TACROLIMUS 2 MG: 1 CAPSULE ORAL at 10:02

## 2023-06-16 RX ADMIN — SODIUM BICARBONATE 650 MG TABLET 650 MG: at 14:02

## 2023-06-16 RX ADMIN — CALCIUM CARBONATE 1250 MG: 1250 SUSPENSION ORAL at 10:03

## 2023-06-16 ASSESSMENT — ACTIVITIES OF DAILY LIVING (ADL)
ADLS_ACUITY_SCORE: 20

## 2023-06-16 NOTE — PROGRESS NOTES
Antepartum Progress Note    S: Patient feeling well this morning. PNT tube output has significantly decreased. Patient has felt intermittent twinges in her abdomen. Patient believes may be due to positioning.  Denies LOF, vaginal bleeding. Endorses normal fetal movement.     Continues to c/o itching in her hands. No rash.     Experienced a great deal of abdominal pressure yesterday PM and found PNT was kinked. Counseled patient on the importance of having the PNT bag place at a level lower than she is in order to help with urine flow to the bag.     O:   Patient Vitals for the past 24 hrs:   BP Temp Temp src Pulse Resp Weight   06/16/23 0937 133/81 -- -- 76 16 --   06/16/23 0900 -- -- -- -- -- 76.8 kg (169 lb 5 oz)   06/16/23 0650 119/70 98.1  F (36.7  C) Oral -- 16 --   06/15/23 2209 129/88 98.1  F (36.7  C) Oral -- -- --   06/15/23 1646 121/76 98.6  F (37  C) Oral -- 24 --     Gen: Sitting up in bed, NAD  CV:  Warm, well perfused   Pulm: Non-labored breathing  Abd: Gravid, non-tender   Ext: Trace swelling, no rash noted on hands or feet     Weight:   Wt Readings from Last 2 Encounters:   06/16/23 76.8 kg (169 lb 5 oz)   06/09/23 (P) 81.1 kg (178 lb 12.8 oz)       FHT: 140 baseline, moderate variability, + accels, no decelerations  TOCO: quiet    I/Os:     Intake/Output Summary (Last 24 hours) at 6/16/2023 1031  Last data filed at 6/16/2023 0915  Gross per 24 hour   Intake 3930 ml   Output 3900 ml   Net 30 ml     Labs:   Recent Labs   Lab 06/16/23  0723 06/15/23  1009 06/15/23  0703 06/14/23  1945 06/14/23  1536 06/14/23  1135   * 138* 93 103* 122* 115*     Tacro level: 6/15 - 5.7     Latest Reference Range & Units 06/16/23 07:23   Sodium 136 - 145 mmol/L 135 (L)   Potassium 3.4 - 5.3 mmol/L 5.0   Chloride 98 - 107 mmol/L 102   Carbon Dioxide (CO2) 22 - 29 mmol/L 19 (L)   Urea Nitrogen 6.0 - 20.0 mg/dL 34.5 (H)   Creatinine 0.51 - 0.95 mg/dL 1.19 (H)   GFR Estimate >60 mL/min/1.73m2 63   Calcium 8.6 - 10.0  mg/dL 9.9   Anion Gap 7 - 15 mmol/L 14   Magnesium 1.7 - 2.3 mg/dL 1.6 (L)   Albumin 3.5 - 5.2 g/dL 3.8   Protein Total 6.4 - 8.3 g/dL 7.1   Alkaline Phosphatase 35 - 104 U/L 84   ALT 0 - 50 U/L 12   AST 0 - 45 U/L 14   Bilirubin Total <=1.2 mg/dL 0.4   Glucose 70 - 99 mg/dL 114 (H)   TSH 0.30 - 4.20 uIU/mL 2.51   (L): Data is abnormally low  (H): Data is abnormally high     Latest Reference Range & Units 23 07:23   WBC 4.0 - 11.0 10e3/uL 13.2 (H)   Hemoglobin 11.7 - 15.7 g/dL 9.2 (L)   Hematocrit 35.0 - 47.0 % 28.7 (L)   Platelet Count 150 - 450 10e3/uL 238   RBC Count 3.80 - 5.20 10e6/uL 2.97 (L)   MCV 78 - 100 fL 97   MCH 26.5 - 33.0 pg 31.0   MCHC 31.5 - 36.5 g/dL 32.1   RDW 10.0 - 15.0 % 13.8   (H): Data is abnormally high  (L): Data is abnormally low     Latest Reference Range & Units 06/15/23 19:03   Total Protein Urine mg/dL mg/dL 17.0 (H)   Total Protein UR MG/MG CR 0.00 - 0.20 mg/mg Cr 0.68 (H)   (H): Data is abnormally high    Imaging:   No new imaging today    A/P: Mona Wagner is a 32 year old  female at 26w0d by 8w4d US, HD#6 admitted for hyperkalemia in the setting of a renal transplant s/p percutaneous nephrostomy tube placement for ureteral stricture and likely obstruction, as well as rule out  labor. Her electrolyte abnormalities were likely d/t post obstructive diuresis.     Patient is doing well today and from an OB perspective stable and ready for discharge. PNT output has significantly decreased, patient has not required IVF in past 24 hours and patient's IOs today are reassuring. Patient has complained of some itching in her hands so bile acids will be done today as well. Per Nephrology will get orthostatic vitals as well. Will not start amlodipine. Will schedule 1 week MFM f/u and f/u with Nephrology per Nephrology.     Hx renal transplant  IgA nephropathy  Allograft hydronephrosis d/t ureteral stenosis  S/p percutaneous nephrostomy placement   Hyperkalemia, currently  resolved  Hypomagnesemia, currently resolved     Suspected Post-ATN Diuresis   - S/p Lokelma (use if K >5.5), s/p insulin/D50  - S/p D5W 1000mL with sodium bicarb 150 mEq/L infusion   - ICU admission and telemetry if K > 5.9  -  NS bolus (administer if experiencing cramping or PNT continues to have high output)  - PTA Tacrolimus, Azathioprine 7mg BID   -Last tacro level was just slightly above goal at 6.2 (Goal 4-6)  - Renal US: unchanged moderate hydro, percutaneous nephrostomy in place  - Is followed by nephrology, appreciate recommendations   - Nephrology f/u  - Continued magnesium supplementation    Rule out  labor   Cervical change from c/l/h on admission to 1/50/H overnight two nights ago. No new obstetric concerns.  - S/p BMZ course (-)  - S/p magnesium load  - MFM f/u in 1 week    cHTN   Goal BP <130/80. Reviewed blood pressures with transplant nephrology team. Discussed a prn plan for antihypertensives. Per this discussion, would be okay to start nifedipine or amlodipine for antihypertensive control. Specifically, no concern from that team about interaction with tacrolimus. Favor amlodipine 2.5 mg daily as this is overall a smaller dose than the initial nifed dose, in an effort to not drastically decrease blood pressures. Previously, had been started on labetalol but experience nipple tenderness with this medication and it was discontinued. Plan for now is to continue to monitor BP. If >50% above goal, will plan to take orthostatic BP readings and possibly start amlodipine 2.5 mg daily.     Hypothyroidism  Secondary Hyperparathyroidism with hypercalcemia s/p parathyroidectomy  - S/p resection of parathyroid glands on 23  - Follows with endo. Current levothyroxine 37.5 mcg daily, increased to 50 mcg daily by Endocrinology.  - TSH 2.28 on 23, free T4 1.0 and free T3 2.2  - Per endo recheck labs monthly     PNC  - Rh +, Rubella and Varicella non-immune, HepB/HIV/RPR NR, GCT  passed, Hgb 8.5, Plts 188  - Other prenatal labs wnl  - Imaging: placenta anterior, comp US 6/8 EFW 15%, AC 20% (662g)    FWB  Overall appropriate for gestational age   - TID monitoring   - s/p NNP consult   - s/p classical CS & blood transfusion consent    Delivery Plan: as clinically indicated, no indication at this time.     Patient seen and discussed with Dr. Zoë Melo, MS4  OBGYN [facility]    Physician Attestation   I, Nancy Camp MD, was present with the medical/MAR student who participated in the service and in the documentation of the note.  I have verified the history and personally performed the physical exam and medical decision making.  I agree with the assessment and plan of care as documented in the note.      Key findings: Doing well. Now able to keep up with hydration orally as U/O has decreased. Electrolytes stable. Feeling ready for hospital discharge. Discussed importance of monitoring I/O at home closely, and return if unable to maintain oral hydration. Plan to follow up with [facility] on Monday with labs, as well as Nephrology later in the week.    35 MINUTES SPENT BY ME on the date of service doing chart review, history, exam, documentation & further activities per the note.     Nancy Camp MD  Date of Service (when I saw the patient): 06/16/23

## 2023-06-16 NOTE — PLAN OF CARE
Goal Outcome Evaluation:  Pt states is comfortable, denies feeling ctx's, leaking or bleeding, Baby AGA on monitor occas ctx's noted, not feeling them. Ambulating, eating and drinking well. Shower, nephrostomy dressing changed. Saline locked flushed.

## 2023-06-16 NOTE — PLAN OF CARE
Labor Shift Note  Data: Contraction pattern stable and within parameters. Fetal assessment Appropriate for Gestational Age.   Betamethasone Completed given on Zehra / 10 / 2023 & .    Interventions: Continue uterine/fetal assessment 3 times daily. Activity level:Regular activity, and preventive measures including Positioning. Encourage active range of motion and frequent position changes.  Plan: Continue expectant management. Observe for and notify care provider of indications of progressing labor or signs of fetal/maternal compromise.

## 2023-06-16 NOTE — PLAN OF CARE
Goal Outcome Evaluation:  Patient's vital signs are stable. Patient has nephrostomy and draining well. Denies pain, eating and drinking well. FRH and uterine activity see flow sheet. Discharging to home this afternoon. Continue with plan of care.

## 2023-06-16 NOTE — DISCHARGE INSTRUCTIONS
Discharge Instruction for Undelivered Patients      You were seen for:   labor and electrolyte imbalance  We Consulted: Nyhom and Nephrology  You had (Test or Medicine): Meds to correct the electrolyte imbalance.      Diet:   Drink 8 to 12 glasses of liquids (milk, juice, water) every day.     Activity:  Call your doctor or nurse midwife if your baby is moving less than usual.     Call your provider if you notice:  Swelling in your face or increased swelling in your hands or legs.  Headaches that are not relieved by Tylenol (acetaminophen).  Changes in your vision (blurring: seeing spots or stars.)  Nausea (sick to your stomach) and vomiting (throwing up).   Weight gain of 5 pounds or more per week.  Heartburn that doesn't go away.  Signs of bladder infection: pain when you urinate (use the toilet), need to go more often and more urgently.  The bag of eagle (rupture of membranes) breaks, or you notice leaking in your underwear.  Bright red blood in your underwear.  Abdominal (lower belly) or stomach pain.  For first baby: Contractions (tightening) less than 5 minutes apart for one hour or more.  Second (plus) baby: Contractions (tightening) less than 10 minutes apart and getting stronger.  *If less than 34 weeks: Contractions (tightening) more than 6 times in one hour.  Increase or change in vaginal discharge (note the color and amount)  Other:    Follow-up:   As scheduled in the clinic

## 2023-06-19 ENCOUNTER — OFFICE VISIT (OUTPATIENT)
Dept: MATERNAL FETAL MEDICINE | Facility: CLINIC | Age: 33
End: 2023-06-19
Attending: ADVANCED PRACTICE MIDWIFE
Payer: MEDICARE

## 2023-06-19 ENCOUNTER — LAB (OUTPATIENT)
Dept: LAB | Facility: CLINIC | Age: 33
End: 2023-06-19
Attending: ADVANCED PRACTICE MIDWIFE
Payer: MEDICARE

## 2023-06-19 ENCOUNTER — TELEPHONE (OUTPATIENT)
Dept: TRANSPLANT | Facility: CLINIC | Age: 33
End: 2023-06-19

## 2023-06-19 VITALS
SYSTOLIC BLOOD PRESSURE: 117 MMHG | HEART RATE: 88 BPM | DIASTOLIC BLOOD PRESSURE: 78 MMHG | RESPIRATION RATE: 18 BRPM | BODY MASS INDEX: 33.59 KG/M2 | OXYGEN SATURATION: 97 % | WEIGHT: 172 LBS

## 2023-06-19 DIAGNOSIS — O09.92 SUPERVISION OF HIGH RISK PREGNANCY IN SECOND TRIMESTER: ICD-10-CM

## 2023-06-19 DIAGNOSIS — O09.92 HIGH-RISK PREGNANCY IN SECOND TRIMESTER: ICD-10-CM

## 2023-06-19 DIAGNOSIS — Z48.298 AFTERCARE FOLLOWING ORGAN TRANSPLANT: ICD-10-CM

## 2023-06-19 DIAGNOSIS — E86.0 DEHYDRATION: ICD-10-CM

## 2023-06-19 DIAGNOSIS — Z94.0 KIDNEY REPLACED BY TRANSPLANT: Primary | ICD-10-CM

## 2023-06-19 DIAGNOSIS — Z94.0 KIDNEY REPLACED BY TRANSPLANT: ICD-10-CM

## 2023-06-19 LAB
ALBUMIN MFR UR ELPH: 6.9 MG/DL
ALBUMIN SERPL BCG-MCNC: 3.8 G/DL (ref 3.5–5.2)
ALP SERPL-CCNC: 87 U/L (ref 35–104)
ALT SERPL W P-5'-P-CCNC: 8 U/L (ref 0–50)
ANION GAP SERPL CALCULATED.3IONS-SCNC: 11 MMOL/L (ref 7–15)
AST SERPL W P-5'-P-CCNC: 14 U/L (ref 0–45)
BASOPHILS # BLD AUTO: 0.1 10E3/UL (ref 0–0.2)
BASOPHILS NFR BLD AUTO: 1 %
BILIRUB SERPL-MCNC: 0.5 MG/DL
BUN SERPL-MCNC: 34.4 MG/DL (ref 6–20)
CALCIUM SERPL-MCNC: 9.7 MG/DL (ref 8.6–10)
CHLORIDE SERPL-SCNC: 103 MMOL/L (ref 98–107)
CREAT SERPL-MCNC: 1.3 MG/DL (ref 0.51–0.95)
CREAT UR-MCNC: 16.3 MG/DL
DEPRECATED HCO3 PLAS-SCNC: 19 MMOL/L (ref 22–29)
EOSINOPHIL # BLD AUTO: 0.2 10E3/UL (ref 0–0.7)
EOSINOPHIL NFR BLD AUTO: 2 %
ERYTHROCYTE [DISTWIDTH] IN BLOOD BY AUTOMATED COUNT: 14.4 % (ref 10–15)
GFR SERPL CREATININE-BSD FRML MDRD: 56 ML/MIN/1.73M2
GLUCOSE SERPL-MCNC: 89 MG/DL (ref 70–99)
HCT VFR BLD AUTO: 29.7 % (ref 35–47)
HGB BLD-MCNC: 9.5 G/DL (ref 11.7–15.7)
IMM GRANULOCYTES # BLD: 0.4 10E3/UL
IMM GRANULOCYTES NFR BLD: 3 %
LYMPHOCYTES # BLD AUTO: 1.4 10E3/UL (ref 0.8–5.3)
LYMPHOCYTES NFR BLD AUTO: 13 %
MAGNESIUM SERPL-MCNC: 1.7 MG/DL (ref 1.7–2.3)
MCH RBC QN AUTO: 31.3 PG (ref 26.5–33)
MCHC RBC AUTO-ENTMCNC: 32 G/DL (ref 31.5–36.5)
MCV RBC AUTO: 98 FL (ref 78–100)
MONOCYTES # BLD AUTO: 0.7 10E3/UL (ref 0–1.3)
MONOCYTES NFR BLD AUTO: 7 %
NEUTROPHILS # BLD AUTO: 8.2 10E3/UL (ref 1.6–8.3)
NEUTROPHILS NFR BLD AUTO: 74 %
NRBC # BLD AUTO: 0 10E3/UL
NRBC BLD AUTO-RTO: 0 /100
PLATELET # BLD AUTO: 235 10E3/UL (ref 150–450)
POTASSIUM SERPL-SCNC: 5.2 MMOL/L (ref 3.4–5.3)
PROT SERPL-MCNC: 7.3 G/DL (ref 6.4–8.3)
PROT/CREAT 24H UR: 0.42 MG/MG CR (ref 0–0.2)
RBC # BLD AUTO: 3.04 10E6/UL (ref 3.8–5.2)
SODIUM SERPL-SCNC: 133 MMOL/L (ref 136–145)
TACROLIMUS BLD-MCNC: 6 UG/L (ref 5–15)
TME LAST DOSE: NORMAL H
TME LAST DOSE: NORMAL H
WBC # BLD AUTO: 10.9 10E3/UL (ref 4–11)

## 2023-06-19 PROCEDURE — 84156 ASSAY OF PROTEIN URINE: CPT

## 2023-06-19 PROCEDURE — 85025 COMPLETE CBC W/AUTO DIFF WBC: CPT

## 2023-06-19 PROCEDURE — 80053 COMPREHEN METABOLIC PANEL: CPT

## 2023-06-19 PROCEDURE — 80197 ASSAY OF TACROLIMUS: CPT

## 2023-06-19 PROCEDURE — 99213 OFFICE O/P EST LOW 20 MIN: CPT | Performed by: ADVANCED PRACTICE MIDWIFE

## 2023-06-19 PROCEDURE — 36415 COLL VENOUS BLD VENIPUNCTURE: CPT

## 2023-06-19 PROCEDURE — G0463 HOSPITAL OUTPT CLINIC VISIT: HCPCS

## 2023-06-19 PROCEDURE — 83735 ASSAY OF MAGNESIUM: CPT

## 2023-06-19 ASSESSMENT — PAIN SCALES - GENERAL: PAINLEVEL: NO PAIN (0)

## 2023-06-19 NOTE — NURSING NOTE
Mona seen in clinic today for OB visit at 26w3d gestation for pregnancy complicated by S/p kidney transplant, s/p nephrostomy tube placement, chronic kidney disease (see report/notes). VSS. Pt reports positive fetal movement, see flowsheet. Pt denies bldg/lof/change in discharge/contractions/headache/vision changes/chest pain/SOB/edema. Pt assessed for  labor, preeclampsia, infection, fetal wellbeing without concerns. Pt notified to review new pt education in AVS, verbally reviewed highlights. Did stop by lab prior to OBV. Pt states she is feeling better since discharge from the hospital. States she is taking 4000 mL PO daily but still feels dehydrated. Approximately 200 mL of output per hour per pt. States occasional itching of hands and feet, bile acid labs pending from Friday. ROSALINDA Toledo CNM met with pt and discussed POC. Plan for RL2/OBV next week. Future visits already scheduled. Pt discharged stable and ambulatory.      Ela Kraft RN

## 2023-06-19 NOTE — TELEPHONE ENCOUNTER
Gelacio Mcintyre MD Sveiven, Sara, RN  Can you please try to get her in to clinic with Stfefen or Marilu within the next 2 weeks? I see her 7/24 but she needs to be seen sooner as well. Thanks     Marilu Grewal MD Sveiven, Sara, RN  Cc: Gelacio Mcintyre MD  Doing better and will be discharged today saw her yesterday     Will need f/up next week and she will see OB Mon and labs twice weekly for now       OUTCOME:  Order placed to see Dr Hill tomorrow at 1330 in person.

## 2023-06-19 NOTE — PATIENT INSTRUCTIONS
Blood Glucose Screening During Pregnancy  Gestational diabetes is diabetes that only pregnant women get. Changes in your body during pregnancy can cause high blood sugar (glucose). This can cause problems for you and your baby. It is a serious condition. But it can be controlled.     Your healthcare provider will talk with you about blood glucose screening.     Who is at risk for gestational diabetes?  You are at risk of getting gestational diabetes if any of the risk factors below apply to you. The risk for this condition gets higher as your number of risk factors increases:    You are , , , , or .    You weigh more than your healthcare provider says is healthy for you.    You have a relative with diabetes.    You are older than 25.    You had gestational diabetes during a past pregnancy.    You had a stillbirth or a very large baby before.    You have a history of abnormal glucose tolerance.    You have sugar in your urine at the first prenatal visit.    You have metabolic syndrome, PCOS (polycystic ovary syndrome), are using glucocorticoids, or have high blood pressure.    You are pregnant with twins or more  What happens during a screening?  Here is what to expect during a blood glucose screening:    There is conflicting advice for screening. But the American College of Obstetricians and Gynecologists currently advises that all pregnant women be screened for gestational diabetes. When you are screened depends on your risk. Women are tested at 24 to 28 weeks of pregnancy. Women at high risk may be tested when they first learn they are pregnant.    To do the screening, a blood sample is taken. Your blood sugar level is measured.    If the results show a high blood sugar level, a glucose tolerance test may be ordered. You will drink a certain amount of sugar. This test measures how long it takes for sugar to leave your blood. The test will show if you  have gestational diabetes.  What to know if you test positive  Here are some things you need to know:    Gestational diabetes can be treated. The best way to control it is to find out you have it early and start treatment quickly.    This condition can cause problems for the mother during pregnancy. It can also cause problems with the baby during pregnancy, delivery, and after. Treatment greatly lowers the chance for problems.    The changes in your body that cause gestational diabetes normally happen only when you are pregnant. After the baby is born, your body goes back to normal. The condition goes away. But you may be more likely to have type 2 diabetes later. Talk with your healthcare provider about ways to help prevent type 2 diabetes.  Treating gestational diabetes  Here is how to treat gestational diabetes:    You ll need to check your blood sugar often. You can do this at home. Prick your finger and check a drop of blood on a glucose monitor. Your healthcare provider will show you how and when to check your blood sugar. They will talk about your target blood sugar level.    To manage your blood sugar, you will be given a special plan. It will likely include meal planning and getting regular exercise. Some women need to take a hormone called insulin. Others may take medicine to help control their blood sugar.  [employer] last reviewed this educational content on 8/1/2020 2000-2022 [employer]. All rights reserved. This information is not intended as a substitute for professional medical care. Always follow your healthcare professional's instructions.          Tdap (Tetanus, Diphtheria, Pertussis) Vaccine: What You Need to Know    This is a Vaccine Information Statement from the CDC.   Many vaccine information statements are available in [place] and other languages. See www.immunize.org/vis   Hojas de información sobre vacunas están disponibles en español y en muchos otros idiomas. Visite  www.immunize.org/vis   1. Why get vaccinated?   Tdap vaccine can prevent tetanus, diphtheria, and pertussis.   Diphtheria and pertussis spread from person to person. Tetanus enters the body through cuts or wounds.     TETANUS (T) causes painful stiffening of the muscles. Tetanus can lead to serious health problems, including being unable to open the mouth, having trouble swallowing and breathing, or death.    DIPHTHERIA (D) can lead to difficulty breathing, heart failure, paralysis, or death.    PERTUSSIS (aP), also known as  whooping cough,  can cause uncontrollable, violent coughing that makes it hard to breathe, eat, or drink. Pertussis can be extremely serious especially in babies and young children, causing pneumonia, convulsions, brain damage, or death. In teens and adults, it can cause weight loss, loss of bladder control, passing out, and rib fractures from severe coughing.  2. Tdap vaccine   Tdap is only for children 7 years and older, adolescents, and adults.   Adolescents should receive a single dose of Tdap, preferably at age 11 or 12 years.   Pregnant people should get a dose of Tdap during every pregnancy, preferably during the early part of the third trimester, to help protect the  from pertussis. Infants are most at risk for severe, life-threatening complications from pertussis.   Adults who have never received Tdap should get a dose of Tdap.   Also, adults should receive a booster dose of either Tdap or Td (a different vaccine that protects against tetanus and diphtheria but not pertussis) every 10 years , or after 5 years in the case of a severe or dirty wound or burn.   Tdap may be given at the same time as other vaccines.   3. Talk with your health care provider  Tell your vaccination provider if the person getting the vaccine:     Has had an allergic reaction after a previous dose of any vaccine that protects against tetanus, diphtheria, or pertussis , or has any severe, life-threatening  allergies    Has had a coma, decreased level of consciousness, or prolonged seizures within 7 days after a previous dose of any pertussis vaccine (DTP, DTaP, or Tdap)    Has seizures or another nervous system problem    Has ever had Guillain-Barré Syndrome (also called  GBS )    Has had severe pain or swelling after a previous dose of any vaccine that protects against tetanus or diphtheria  In some cases, your health care provider may decide to postpone Tdap vaccination until a future visit.   People with minor illnesses, such as a cold, may be vaccinated. People who are moderately or severely ill should usually wait until they recover before getting Tdap vaccine.   Your health care provider can give you more information.  4. Risks of a vaccine reaction     Pain, redness, or swelling where the shot was given, mild fever, headache, feeling tired, and nausea, vomiting, diarrhea, or stomachache sometimes happen after Tdap vaccination.  People sometimes faint after medical procedures, including vaccination. Tell your provider if you feel dizzy or have vision changes or ringing in the ears.   As with any medicine, there is a very remote chance of a vaccine causing a severe allergic reaction, other serious injury, or death.   5. What if there is a serious problem?  An allergic reaction could occur after the vaccinated person leaves the clinic. If you see signs of a severe allergic reaction (hives, swelling of the face and throat, difficulty breathing, a fast heartbeat, dizziness, or weakness), call 9-1-1 and get the person to the nearest hospital.   For other signs that concern you, call your health care provider.   Adverse reactions should be reported to the Vaccine Adverse Event Reporting System (VAERS). Your health care provider will usually file this report, or you can do it yourself. Visit the VAERS website at www.vaers.hhs.gov or call 1-664.783.6228. VAERS is only for reporting reactions, and VAERS staff members do  not give medical advice.   6. The National Vaccine Injury Compensation Program  The National Vaccine Injury Compensation Program (VICP) is a federal program that was created to compensate people who may have been injured by certain vaccines. Claims regarding alleged injury or death due to vaccination have a time limit for filing, which may be as short as two years. Visit the VICP website at www.hrsa.gov/vaccinecompensation or call 1-366.844.8331 to learn about the program and about filing a claim.   7. How can I learn more?     Ask your health care provider.    Call your local or state health department.    Visit the website of the Food and Drug Administration (FDA) for vaccine package inserts and additional information at www.fda.gov/vaccines-blood-biologics/vaccines.  ? Contact the Centers for Disease Control and Prevention (CDC): Call 1-342.644.4110 ( 1-555-HWE-INFO) or  ? Visit CDC s website at www.cdc.gov/vaccines.  Vaccine Information Statement   Tdap (Tetanus, Diphtheria, Pertussis) Vaccine  42 U.S.C.   [id number]  2021  [employer] last reviewed this educational content on     7549-7089 [employer]. All rights reserved. This information is not intended as a substitute for professional medical care. Always follow your healthcare professional's instructions.        You have been provided the My Labor and Birth Wishes document.  Please review at home and bring to your next prenatal visit. Bring this sheet to the hospital for your birth. Give copies to your care team members and support person.   Additional copies can be found here:  [email or web address]    Understanding  Labor  Going into labor before week 37 of pregnancy is called  labor.  labor can cause your baby to be born too soon. This can lead to health problems for your baby.     Before labor, the cervix is thick and closed.      In  labor, the cervix begins to efface (thin) and dilate (open).     Symptoms  of  labor  If you think you re having  labor, get medical help right away. Contractions alone don t mean you re in  labor. What matters more are changes in your cervix. The cervix is the opening at the lower end of the uterus. Symptoms of  labor include:    4 or more contractions per hour    Strong contractions    Constant menstrual-like cramping    Low-back pain    Mucous or bloody fluid from the vagina    Bleeding or spotting in the second or third trimester  Evaluating  labor  Your healthcare provider will try to find out if you re in  labor or just having contractions. They may watch you for a few hours. You may have these tests:    Pelvic exam. This is to see if your cervix has effaced (thinned) and dilated (opened).    Uterine activity monitoring. This is used to detect contractions.    Fetal monitoring. This is done to check the health of your baby.    Ultrasound. This test looks at your baby s size and position.    Amniocentesis. This test checks how mature your baby s lungs are.  Caring for yourself at home  If you have  contractions, but your cervix is still thick and closed, your healthcare provider may tell you to:    Drink plenty of water.    Do fewer activities.    Rest in bed on your side.    Don't have intercourse or stimulate your nipples.  When to call your healthcare provider  Call your healthcare provider if you have any of these:    4 or more contractions per hour    Bag of water breaks    Bleeding or spotting  If you need hospital care   labor often means that you need hospital care. You may need complete bed rest. You may have an IV (intravenous) line in your arm or hand. This is to give you fluids. You may be given pills or injections. These are done to help prevent contractions. You may get a medicine called a corticosteroid. This is to help your baby s lungs mature more quickly.  Are you at risk?  Any pregnant woman can have   labor. It may start for no reason. But these risk factors can increase your chances:    Past  labor or early birth    Smoking, drug, or alcohol use in pregnancy    A multiple pregnancy (twins or more)    Problems with the shape of the uterus    Bleeding during the pregnancy  The dangers of  birth  A baby born too soon may have health problems. This is because the baby didn t have enough time to grow. Some of the risks for your baby include:    Not breastfeeding or feeding well    Having immature lungs    Bleeding in the brain    Death  Reaching term  Your goal is to get as close to term (week 37 or later) as you can before giving birth. The closer you get to term, the higher your chance of having a healthy baby. Work with your healthcare provider. Together, you can take steps that may keep you from giving birth too early.  [employer] last reviewed this educational content on 10/1/2021    0343-5614 [employer]. All rights reserved. This information is not intended as a substitute for professional medical care. Always follow your healthcare professional's instructions.

## 2023-06-19 NOTE — PROGRESS NOTES
Maternal fetal Medicine OB Follow up visit.     Mona Wagner  : 1990  MRN: [id number]    CC: OB Follow-up    Subjective:  Mona Wagner is a 32 year old  at 26w3d presenting for routine OB follow-up. Today, she is here with her sister, [place], and is overall feeling well. Patient denies regular, painful contractions, denies loss of fluid or vaginal bleeding. Reports fetal movement. She is having nightly leg cramps and wondering if there is anything she can do for this. She has noticed some ongoing vaginal mucous, which is pink-tinged in color at times. Has not changed since previous vaginitis and fern test. She continues to have itching on the tops of her hands and feet, but bile acid levels are not yet back.      In regards to her recent nephrostomy tube, she reports that the tubing gets kinked at the connection to the collection bag at times, but is overall function well. She reports feeling very thirsty and dehydrated and is drinking around 4L of water per day. She has to empty the collection bag frequently. Denies any s/s of UTI or bladder infection. No fevers, flank pain, or unusual color or odor to her urine.       OB Hx:  OB History    Para Term  AB Living   1 0 0 0 0 0   SAB IAB Ectopic Multiple Live Births   0 0 0 0 0      # Outcome Date GA Lbr Robinson/2nd Weight Sex Delivery Anes PTL Lv   1 Current                  Objective:  /78 (BP Location: Right arm, Patient Position: Sitting, Cuff Size: Adult Regular)   Pulse 88   Resp 18   Wt 78 kg (172 lb)   LMP 2022   SpO2 97%   BMI 33.59 kg/m      Gen: alert, oriented, NAD  Skin: warm, dry, intact  Respiratory: breathing unlabored, no SOB  Abdominal: gravid, non-tender, nephrostomy insertion site covered with bandage c/d/i. Urine flowing without obstruction to collection bag. FHTs: 140  Pelvic: deferred  Extremities: WNL. AV fistula in place on left forearm.  Psych: mood WNL, behavior WNL      OB Ultrasound:  Please see  "\"imaging\" tab under chart review for today's ultrasound results.      Assessment/Plan:  32 year old  at 26w3d here for follow OB visit.    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 Mcintyre in transplant nephrology. Resolution of cHTN s/p transplant.   - Nephrostomy tube placed on  with subsequent hyperkalemia due to profound diuresis after placement. Resolution of electrolyte imbalance noted on labs today (Mg 1.7, K 5.2).  - Continue with Tacrolimus (goal 4-6) and Azathioprine as prescribed by nephrology. Tacro level 6.0 today.  - Baseline Cr 0.8-1.1; mild proteinuria UPC ratio 0.25. UPC down trending (peak of 1.65, 0.42 today)   - Close management of blood pressure with goal of <130/90  - Early detection and treatment of asymptomatic bacteriuria with urine culture at least every trimester. UC last on  and WNL.     Hypothyroidism:  Secondary Hyperparathyroidism with hypercalcemia   - Resection of parathyroid glands on .   - Follows with endo. Continue current levothyroxine prescription 37.5mcg daily.  - TSH of 0.37 and normal T4 on 23.     Hx DVT:  - Assessment for inherited thrombophilias including Factor V Leiden and Prothrombin Gene Mutation:  Negative.  - Additional hypercoagulability work up negative.     Hx of bacterial endocarditis:  - Echo on 3/20:  Interpretation Summary  Global and regional left ventricular function is hyperkinetic with an EF >70%.  Right ventricular function, chamber size, wall motion, and thickness are  normal.  Pulmonary artery systolic pressure is normal.  The inferior vena cava cannot be assessed.  No pericardial effusion is present.     Routine PNC:  - Prenatal labs:               Rh: " +  antibody: neg               HepB/HIV/RPR/HepC: nonreactive               GC/CT: neg               Rubella: non-immune  Varicella: non-immune               GCT: passed early GCT. Will repeat 24-28 weeks               UC: no growth               Pap: NIL and HPV neg  - Immunizations: s/p Flu and Covid. Recommend Tdap at 28 weeks  - Genetic screening: NIPT low-risk. Low-risk NT  - PTL s/s reviewed to help differentiate from typical pregnancy symptoms.       Surveillance:  - s/p 2/3 complete.   - Serial growth US q4 after anatomy scan  - Weekly BPPs at 32 weeks. Could consider earlier initiation if clinically indicated.     Delivery Planning:  - Timing of delivery will be indicated by secondary sequelae, but generally 37-39 weeks.  reserved for usual obstetrical indications.   - Labor analgesia: will discuss at later date  - Feeding: plans to formula feed  - Contraception: needs to be discussed    RTC in 1 week    25 minutes spent on the date of the encounter, doing chart review, history and exam, documentation and further activities as noted.      Sonam Toledo CNM on 2023 at 12:09 PM

## 2023-06-20 ENCOUNTER — OFFICE VISIT (OUTPATIENT)
Dept: TRANSPLANT | Facility: CLINIC | Age: 33
End: 2023-06-20
Attending: INTERNAL MEDICINE
Payer: MEDICARE

## 2023-06-20 ENCOUNTER — INFUSION THERAPY VISIT (OUTPATIENT)
Dept: INFUSION THERAPY | Facility: HOSPITAL | Age: 33
End: 2023-06-20
Attending: INTERNAL MEDICINE
Payer: MEDICARE

## 2023-06-20 ENCOUNTER — PATIENT OUTREACH (OUTPATIENT)
Dept: CARE COORDINATION | Facility: CLINIC | Age: 33
End: 2023-06-20
Payer: MEDICARE

## 2023-06-20 VITALS
HEART RATE: 96 BPM | HEIGHT: 60 IN | DIASTOLIC BLOOD PRESSURE: 68 MMHG | BODY MASS INDEX: 33.61 KG/M2 | SYSTOLIC BLOOD PRESSURE: 113 MMHG | OXYGEN SATURATION: 97 % | WEIGHT: 171.2 LBS

## 2023-06-20 VITALS
TEMPERATURE: 97.2 F | DIASTOLIC BLOOD PRESSURE: 58 MMHG | SYSTOLIC BLOOD PRESSURE: 112 MMHG | RESPIRATION RATE: 18 BRPM | OXYGEN SATURATION: 98 % | HEART RATE: 99 BPM

## 2023-06-20 DIAGNOSIS — D63.1 ANEMIA OF CHRONIC RENAL FAILURE, STAGE 3A (H): ICD-10-CM

## 2023-06-20 DIAGNOSIS — O09.92 HIGH-RISK PREGNANCY IN SECOND TRIMESTER: ICD-10-CM

## 2023-06-20 DIAGNOSIS — Z94.0 KIDNEY REPLACED BY TRANSPLANT: ICD-10-CM

## 2023-06-20 DIAGNOSIS — N18.31 STAGE 3A CHRONIC KIDNEY DISEASE (H): Primary | ICD-10-CM

## 2023-06-20 DIAGNOSIS — N18.31 STAGE 3A CHRONIC KIDNEY DISEASE (H): ICD-10-CM

## 2023-06-20 DIAGNOSIS — Z48.298 AFTERCARE FOLLOWING ORGAN TRANSPLANT: Primary | ICD-10-CM

## 2023-06-20 DIAGNOSIS — Q62.10 URETERAL STENOSIS: ICD-10-CM

## 2023-06-20 DIAGNOSIS — N18.31 ANEMIA OF CHRONIC RENAL FAILURE, STAGE 3A (H): ICD-10-CM

## 2023-06-20 DIAGNOSIS — D84.9 IMMUNOSUPPRESSED STATUS (H): ICD-10-CM

## 2023-06-20 PROCEDURE — 99214 OFFICE O/P EST MOD 30 MIN: CPT | Mod: 24 | Performed by: INTERNAL MEDICINE

## 2023-06-20 PROCEDURE — 96372 THER/PROPH/DIAG INJ SC/IM: CPT | Performed by: INTERNAL MEDICINE

## 2023-06-20 PROCEDURE — G0463 HOSPITAL OUTPT CLINIC VISIT: HCPCS | Performed by: INTERNAL MEDICINE

## 2023-06-20 PROCEDURE — 250N000011 HC RX IP 250 OP 636: Mod: EC | Performed by: INTERNAL MEDICINE

## 2023-06-20 RX ORDER — MEPERIDINE HYDROCHLORIDE 25 MG/ML
25 INJECTION INTRAMUSCULAR; INTRAVENOUS; SUBCUTANEOUS EVERY 30 MIN PRN
Status: CANCELLED | OUTPATIENT
Start: 2023-06-20

## 2023-06-20 RX ORDER — DIPHENHYDRAMINE HYDROCHLORIDE 50 MG/ML
50 INJECTION INTRAMUSCULAR; INTRAVENOUS
Status: CANCELLED
Start: 2023-06-20

## 2023-06-20 RX ORDER — ALBUTEROL SULFATE 90 UG/1
1-2 AEROSOL, METERED RESPIRATORY (INHALATION)
Status: CANCELLED
Start: 2023-06-20

## 2023-06-20 RX ORDER — ALBUTEROL SULFATE 0.83 MG/ML
2.5 SOLUTION RESPIRATORY (INHALATION)
Status: CANCELLED | OUTPATIENT
Start: 2023-06-20

## 2023-06-20 RX ORDER — EPINEPHRINE 1 MG/ML
0.3 INJECTION, SOLUTION INTRAMUSCULAR; SUBCUTANEOUS EVERY 5 MIN PRN
Status: CANCELLED | OUTPATIENT
Start: 2023-06-20

## 2023-06-20 RX ORDER — METHYLPREDNISOLONE SODIUM SUCCINATE 125 MG/2ML
125 INJECTION, POWDER, LYOPHILIZED, FOR SOLUTION INTRAMUSCULAR; INTRAVENOUS
Status: CANCELLED
Start: 2023-06-20

## 2023-06-20 RX ADMIN — DARBEPOETIN ALFA 40 MCG: 40 INJECTION, SOLUTION INTRAVENOUS; SUBCUTANEOUS at 10:53

## 2023-06-20 ASSESSMENT — PAIN SCALES - GENERAL: PAINLEVEL: NO PAIN (0)

## 2023-06-20 NOTE — LETTER
6/20/2023      RE: Mona Wagner  [address]  [facility] [id number]       TRANSPLANT NEPHROLOGY CHRONIC POST TRANSPLANT VISIT    Assessment & Plan   # DDKT: Stable creatinine, slightly above baseline, but improved from recent CAMERON.  The CAMERON was felt secondary to ureteral obstruction, s/p PNT.  Patient has resolution of dyspnea and leg swelling, as well as apparent post-obstructive diuresis, although repeat ultrasound a couple of days after placement still showed moderate hydronephrosis.   - Baseline Creatinine:  ~ 0.8-1.1   - Proteinuria: Minimal (0.2-0.5 grams)   - Date DSA Last Checked: Sep/2022      Latest DSA: No cPRA: 25%   - BK Viremia: No   - Kidney Tx Biopsy: Sep 17, 2019; Result: No diagnostic evidence of acute rejection.    # Immunosuppression: Tacrolimus immediate release (goal 4-6) and Azathioprine (dose 150 mg daily)   - Continue with intensive monitoring of immunosuppression for efficacy and toxicity.   - Changes: Not at this time, but with risk of significant decrease in tacrolimus level during pregnancy, will need to follow closely and target upper end of goal.    # Infection Prophylaxis:   Last CD4 Level: 201 (Jul/2020)  - PJP: None    # Blood Pressure: Controlled, but low at times;  Goal BP: > 100, but < 130 systolic   - Changes: Not at this time; Patient was on Florinef prior to pregnancy.  Would encourage good hydration.    # Anemia in Chronic Renal Disease: Hgb: Stable      CHARLINE: No   - Iron studies: Replete    # Mineral Bone Disorder: Patient is s/p parathyroidectomy 4/2023 with 3.5 glands removed.  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  - Vitamin D; level: Low        On supplement: No; Consider starting cholecalciferol in the near future.  - Calcium; level: Normal        On supplement: Yes    # Electrolytes:   - Potassium; level: High normal        On  supplement: No  - Magnesium; level: Normal        On supplement: Yes  - Bicarbonate; level: Stable low        On supplement: Yes    # Hyponatremia: Slightly low serum sodium level, possibly due to excess free water intake in the setting of CAMERON and possible post-obstruction diuresis.   - Recommend slightly decreasing free water intake and will follow.    # Ureteral Stenosis/Hydronephrosis: Patient with moderate hydronephrosis of kidney transplant and developed CAMERON.  She underwent PNT 6/9 with repeat kidney transplant ultrasound 6/11 still showing moderate hydronephrosis suggesting this may be a functional hydronephrosis from pregnancy.  However, patient does report resolution of edema and dyspnea, as well as apparent post-obstructive diuresis.  She has a previous h/o ureteral stenosis with ureteral stent removed 2/2021.  Previous kidney transplant ultrasound 7/2021 also showed moderate hydronephrosis unchanged with voiding. Hydronephrosis was unchanged on 7/6/22 abdominal CT.  The persistent moderate hydronephrosis may just be functional at this point.  However, with improvement of symptoms, it appears patient has benefited from recent PNT.  Followed by Urology.   - Would consider repeating kidney transplant ultrasound now with PNT, but not clear if it would change the plan at this time.    # Pregnancy: Patient is 26 weeks 4 days pregnant.  She was recently admitted to rule out preeclampsia and told to not be very active.  Followed by MFM.    # GERD: Controlled on H2 blocler.    # Hyperprolactinemia: Normal prolactin level with last check.  Felt secondary to hypothyroidism versus kidney failure, or less likely now a pituitary microadenoma.  Followed by Endocrinology.    # Right Axillary Lymph Node: Patient was referred to general surgery for excisional biopsy in 8/2022, but decision was made to watch the node.  Node appears to be stable for the last year at least.   - Would consider excisional biopsy post  partum.    # Skin Cancer Risk:    - Discussed sun protection and recommend regular follow up with Dermatology.    # Medical Compliance: Yes    # Health Maintenance and Vaccination Review: Not Reviewed    # Transplant History:  Etiology of Kidney Failure: Unknown etiology (no kidney biopsy)  Tx: DDKT  Transplant: 8/3/2019 (Kidney)  Significant changes in immunosuppression: Changed from mycophenolate to azathioprine for pregnancy.  Significant transplant-related complications: Transplant ureteral stenosis    Transplant Office Phone Number: [phone]    Assessment and plan was discussed with the patient and she voiced her understanding and agreement.    Return visit: Return for previously scheduled visit.    Nakul Hill MD    Chief Complaint   Ms. Wagner is a 32 year old here for kidney transplant and immunosuppression management.    History of Present Illness    Ms. Wagner reports feeling good overall with some medical complaints.  She is now ~ 26 weeks 4 days pregnant.  Patient was recently admitted a couple of weeks ago, initially to rule out preeclampsia and then found to have CAMERON with creatinine up to ~ 1.7.  Kidney transplant ultrasound showed moderate hydronephrosis and patient underwent PNT placement on 6/9.  Repeat kidney transplant ultrasound on 6/11 still showed moderate hydronephrosis.  She reports almost all her urine is coming out of the PNT.  She did get some increase in urine output and resolution of mild leg swelling following the PNT, suggesting there was an obstruction, however.    Her energy level is better after hospitalization, now pretty closer to normal.  She has not been very active and isn't supposed to exercise due to concern for preeclampsia.  Denies any chest pain, but some shortness of breath with exertion, but also better since placement of PNT.  Leg swelling has resolved.    Appetite is good.  No nausea, vomiting or diarrhea.  No fever, sweats or chills, but occasionally will  feel clammy if the weather is really warm.  No night sweats.    Home BP: 100/60s with soem lightheadedness in the morning, but resolves with oral hydration.    Problem List   Patient Active Problem List   Diagnosis     DVT of upper extremity (deep vein thrombosis) (H)     Seizure disorder (H)     Acquired hypothyroidism     Increased prolactin level     Aftercare following organ transplant     Immunosuppressed status (H)     Kidney replaced by transplant     Anxiety     Vitamin D deficiency     CKD (chronic kidney disease) stage 3, GFR 30-59 ml/min (H)     Bicornate uterus     Ureteral stenosis     Orthostatic hypotension     Tertiary hyperparathyroidism (H)     High-risk pregnancy in second trimester     Dehydration     Stage 3a chronic kidney disease (H)     Anemia of chronic renal failure     Elevated blood pressure affecting pregnancy in second trimester, antepartum      contractions       Allergies   Allergies   Allergen Reactions     Contrast Dye Rash     CT contrast allergy 19 rash over eyes. Need to have pre medication before a CT WITH CONTRAST      Diatrizoate Rash     CT contrast allergy 19 rash over eyes. Need to have pre medication before a CT WITH CONTRAST      Lisinopril Swelling     angioedema     Nitrous Oxide Other (See Comments)     Sense of doom     Chlorhexidine Rash     Rash at site     Sulfa Antibiotics Rash     Muscle stiffness of neck     Dapsone      Methemoglobinemia     Furosemide Other (See Comments)     Skin flushing     Metrogel [Metronidazole]      Hives diffusely on body after vaginal administration.     Azithromycin Dizziness and Rash     Cefuroxime Rash       Medications   Current Outpatient Medications   Medication Sig     aspirin (ASA) 81 MG chewable tablet Take 1 tablet (81 mg) by mouth daily     cyanocobalamin (VITAMIN B-12) 1000 MCG tablet Take 1 tablet (1,000 mcg) by mouth daily     famotidine (PEPCID) 20 MG tablet Take 1 tablet (20 mg) by mouth 2 times  daily     levothyroxine (SYNTHROID/LEVOTHROID) 50 MCG tablet Take 1 tablet (50 mcg) by mouth daily     magnesium oxide (MAG-OX) 400 MG tablet Take 2 tablets (800 mg) by mouth 2 times daily     polyethylene glycol (MIRALAX) 17 GM/Dose powder Take 17 g (1 capful) by mouth daily as needed for constipation     simethicone (MYLICON) 125 MG chewable tablet Take 1 tablet (125 mg) by mouth 4 times daily as needed for intestinal gas     tacrolimus (GENERIC EQUIVALENT) 1 MG capsule Take 2 capsules (2 mg) by mouth 2 times daily Total dose = 7 mg twice daily     tacrolimus (GENERIC EQUIVALENT) 5 MG capsule Take 1 capsule (5 mg) by mouth 2 times daily Total dose = 7 mg every AM and 7 mg every PM     acetaminophen (TYLENOL) 325 MG tablet Take 2 tablets (650 mg) by mouth every 4 hours as needed for mild pain     azaTHIOprine (IMURAN) 50 MG tablet Take 3 tablets (150 mg) by mouth daily     Prenatal Vit-Fe Fumarate-FA (PRENATAL MULTIVITAMIN W/IRON) 27-0.8 MG tablet Take 1 tablet by mouth every evening     sodium bicarbonate 650 MG tablet Take 1 tablet (650 mg) by mouth 2 times daily     No current facility-administered medications for this visit.     There are no discontinued medications.    Physical Exam   Vital Signs: /68   Pulse 96   Ht 1.524 m (5')   Wt 77.7 kg (171 lb 3.2 oz)   LMP 12/16/2022   SpO2 97%   BMI 33.44 kg/m      GENERAL APPEARANCE: alert and no distress  HENT: mouth without ulcers or lesions  LYMPHATICS: no cervical or supraclavicular nodes; small ~ 0.5 cm right axillary lymph node, non tender  RESP: lungs clear to auscultation - no rales, rhonchi or wheezes  CV: regular rhythm, normal rate, no rub, 2/6 systolic murmur  EDEMA: no LE edema bilaterally  ABDOMEN: soft, nondistended, nontender, bowel sounds normal  MS: extremities normal - no gross deformities noted, no evidence of inflammation in joints, no muscle tenderness  SKIN: no rash  TX KIDNEY: normal  DIALYSIS ACCESS:  LUE AV fistula with good  thrill      Data         Latest Ref Rng & Units 6/22/2023    10:07 AM 6/19/2023    10:28 AM 6/16/2023     7:23 AM   Renal   Sodium 136 - 145 mmol/L 136  133  135    K 3.4 - 5.3 mmol/L 5.1  5.2  5.0    Cl 98 - 107 mmol/L 103  103  102    Cl (external) 98 - 107 mmol/L 103  103  102    CO2 22 - 29 mmol/L 22  19  19    Urea Nitrogen 6.0 - 20.0 mg/dL 27.6  34.4  34.5    Creatinine 0.51 - 0.95 mg/dL 1.30  1.30  1.19    Glucose 70 - 99 mg/dL 88  89  114    Calcium 8.6 - 10.0 mg/dL 10.1  9.7  9.9    Magnesium 1.7 - 2.3 mg/dL  1.7  1.6          Latest Ref Rng & Units 6/15/2023    10:09 AM 6/12/2023     2:35 AM 6/11/2023     4:14 PM   Bone Health   Phosphorus 2.5 - 4.5 mg/dL 3.6  2.6  3.3          Latest Ref Rng & Units 6/22/2023    10:07 AM 6/19/2023    10:28 AM 6/16/2023     7:23 AM   Heme   WBC 4.0 - 11.0 10e3/uL 9.3  10.9  13.2    Hgb 11.7 - 15.7 g/dL 9.1  9.5  9.2    Plt 150 - 450 10e3/uL 204  235  238          Latest Ref Rng & Units 6/19/2023    10:28 AM 6/16/2023     7:23 AM 6/15/2023    10:09 AM   Liver   AP 35 - 104 U/L 87  84  83    TBili <=1.2 mg/dL 0.5  0.4  0.5    ALT 0 - 50 U/L 8  12  11    AST 0 - 45 U/L 14  14  16    Tot Protein 6.4 - 8.3 g/dL 7.3  7.1  7.2    Albumin 3.5 - 5.2 g/dL 3.8  3.8  4.0          Latest Ref Rng & Units 2/6/2023    10:23 AM 6/22/2022     6:39 AM 8/4/2020     9:05 AM   Pancreas   A1C <5.7 % 5.2   5.5    Lipase 0 - 52 U/L  39           Latest Ref Rng & Units 5/22/2023    10:23 AM 5/22/2023    10:22 AM 4/25/2023    10:35 AM   Iron studies   Iron 37 - 145 ug/dL  80  49    Iron Sat Index 15 - 46 %  41  27    Ferritin 6 - 175 ng/mL 1,618   1,770          Latest Ref Rng & Units 7/7/2021     9:20 AM 6/2/2021     9:25 AM 5/3/2021     9:02 AM   [facility] Txp Virology   BK Spec  Plasma  Plasma  Plasma    BK Res BKNEG^BK Virus DNA Not Detected copies/mL BK Virus DNA Not Detected  BK Virus DNA Not Detected  BK Virus DNA Not Detected    BK Log <2.7 Log copies/mL Not Calculated  Not Calculated  Not  Calculated         Recent Labs   Lab Test 06/12/23  0235 06/13/23  0649 06/15/23  1009 06/19/23  1028 06/22/23  1007   [facility] 06-  --   --  6/18/2023 6/21/2023   TACROL 25.4*   < > 5.7 6.0 4.8*    < > = values in this interval not displayed.     Recent Labs   Lab Test 08/16/19  0807 09/03/19  0944   DOSMPA Not Provided 0840pm   MPACID 1.92 3.39   MPAG 149.2* 52.8       Nakul Hill MD

## 2023-06-20 NOTE — NURSING NOTE
Chief Complaint   Patient presents with     RECHECK     Kidney TX     /68   Pulse 96   Ht 1.524 m (5')   Wt 77.7 kg (171 lb 3.2 oz)   LMP 12/16/2022   SpO2 97%   BMI 33.44 kg/m        Hemant Shaver MA

## 2023-06-20 NOTE — PROGRESS NOTES
Infusion Nursing Note:  Mona Wagner presents today for Aranesp injection.    Patient seen by provider today: No   present during visit today: Not Applicable.    Note: Mona arrived ambulatory. Hgb and hematocrit were drawn yesterday and meet parameters for Arasesp today. Aranesp injected subcutaneously to right upper arm. Mona is scheduled to return 7/3/2023 for labs and possible aranesp injection.      Intravenous Access:  No Intravenous access/labs at this visit.    Treatment Conditions:  Lab Results   Component Value Date    HGB 9.5 (L) 06/19/2023    WBC 10.9 06/19/2023    ANEU 4.3 02/02/2021    ANEUTAUTO 8.2 06/19/2023     06/19/2023          Post Infusion Assessment:  Patient tolerated injection without incident.  Site patent and intact, free from redness, edema or discomfort.       Discharge Plan:   Patient discharged in stable condition accompanied by: self.  Departure Mode: Ambulatory.      Sharon Soni RN     no

## 2023-06-20 NOTE — LETTER
6/20/2023         RE: Mona Wagner  [address]  [facility] [id number]        Dear Colleague,    Thank you for referring your patient, Mona Wagner, to the [facility]. Please see a copy of my visit note below.    TRANSPLANT NEPHROLOGY CHRONIC POST TRANSPLANT VISIT    Assessment & Plan   # DDKT: Stable creatinine, slightly above baseline, but improved from recent CAMERON.  The CAMERON was felt secondary to ureteral obstruction, s/p PNT.  Patient has resolution of dyspnea and leg swelling, as well as apparent post-obstructive diuresis, although repeat ultrasound a couple of days after placement still showed moderate hydronephrosis.   - Baseline Creatinine:  ~ 0.8-1.1   - Proteinuria: Minimal (0.2-0.5 grams)   - Date DSA Last Checked: Sep/2022      Latest DSA: No cPRA: 25%   - BK Viremia: No   - Kidney Tx Biopsy: Sep 17, 2019; Result: No diagnostic evidence of acute rejection.    # Immunosuppression: Tacrolimus immediate release (goal 4-6) and Azathioprine (dose 150 mg daily)   - Continue with intensive monitoring of immunosuppression for efficacy and toxicity.   - Changes: Not at this time, but with risk of significant decrease in tacrolimus level during pregnancy, will need to follow closely and target upper end of goal.    # Infection Prophylaxis:   Last CD4 Level: 201 (Jul/2020)  - PJP: None    # Blood Pressure: Controlled, but low at times;  Goal BP: > 100, but < 130 systolic   - Changes: Not at this time; Patient was on Florinef prior to pregnancy.  Would encourage good hydration.    # Anemia in Chronic Renal Disease: Hgb: Stable      CHARLINE: No   - Iron studies: Replete    # Mineral Bone Disorder: Patient is s/p parathyroidectomy 4/2023 with 3.5 glands removed.  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  - Vitamin D; level: Low        On supplement: No;  Consider starting cholecalciferol in the near future.  - Calcium; level: Normal        On supplement: Yes    # Electrolytes:   - Potassium; level: High normal        On supplement: No  - Magnesium; level: Normal        On supplement: Yes  - Bicarbonate; level: Stable low        On supplement: Yes    # Hyponatremia: Slightly low serum sodium level, possibly due to excess free water intake in the setting of CAMERON and possible post-obstruction diuresis.   - Recommend slightly decreasing free water intake and will follow.    # Ureteral Stenosis/Hydronephrosis: Patient with moderate hydronephrosis of kidney transplant and developed CAMERON.  She underwent PNT 6/9 with repeat kidney transplant ultrasound 6/11 still showing moderate hydronephrosis suggesting this may be a functional hydronephrosis from pregnancy.  However, patient does report resolution of edema and dyspnea, as well as apparent post-obstructive diuresis.  She has a previous h/o ureteral stenosis with ureteral stent removed 2/2021.  Previous kidney transplant ultrasound 7/2021 also showed moderate hydronephrosis unchanged with voiding. Hydronephrosis was unchanged on 7/6/22 abdominal CT.  The persistent moderate hydronephrosis may just be functional at this point.  However, with improvement of symptoms, it appears patient has benefited from recent PNT.  Followed by Urology.   - Would consider repeating kidney transplant ultrasound now with PNT, but not clear if it would change the plan at this time.    # Pregnancy: Patient is 26 weeks 4 days pregnant.  She was recently admitted to rule out preeclampsia and told to not be very active.  Followed by M.    # GERD: Controlled on H2 blocler.    # Hyperprolactinemia: Normal prolactin level with last check.  Felt secondary to hypothyroidism versus kidney failure, or less likely now a pituitary microadenoma.  Followed by Endocrinology.    # Right Axillary Lymph Node: Patient was referred to general surgery for  excisional biopsy in 8/2022, but decision was made to watch the node.  Node appears to be stable for the last year at least.   - Would consider excisional biopsy post partum.    # Skin Cancer Risk:    - Discussed sun protection and recommend regular follow up with Dermatology.    # Medical Compliance: Yes    # Health Maintenance and Vaccination Review: Not Reviewed    # Transplant History:  Etiology of Kidney Failure: Unknown etiology (no kidney biopsy)  Tx: DDKT  Transplant: 8/3/2019 (Kidney)  Significant changes in immunosuppression: Changed from mycophenolate to azathioprine for pregnancy.  Significant transplant-related complications: Transplant ureteral stenosis    Transplant Office Phone Number: [phone]    Assessment and plan was discussed with the patient and she voiced her understanding and agreement.    Return visit: Return for previously scheduled visit.    Nakul Hill MD    Chief Complaint   Ms. Wagner is a 32 year old here for kidney transplant and immunosuppression management.    History of Present Illness    Ms. Wagner reports feeling good overall with some medical complaints.  She is now ~ 26 weeks 4 days pregnant.  Patient was recently admitted a couple of weeks ago, initially to rule out preeclampsia and then found to have CAMERON with creatinine up to ~ 1.7.  Kidney transplant ultrasound showed moderate hydronephrosis and patient underwent PNT placement on 6/9.  Repeat kidney transplant ultrasound on 6/11 still showed moderate hydronephrosis.  She reports almost all her urine is coming out of the PNT.  She did get some increase in urine output and resolution of mild leg swelling following the PNT, suggesting there was an obstruction, however.    Her energy level is better after hospitalization, now pretty closer to normal.  She has not been very active and isn't supposed to exercise due to concern for preeclampsia.  Denies any chest pain, but some shortness of breath with exertion, but also  better since placement of PNT.  Leg swelling has resolved.    Appetite is good.  No nausea, vomiting or diarrhea.  No fever, sweats or chills, but occasionally will feel clammy if the weather is really warm.  No night sweats.    Home BP: 100/60s with soem lightheadedness in the morning, but resolves with oral hydration.    Problem List   Patient Active Problem List   Diagnosis    DVT of upper extremity (deep vein thrombosis) (H)    Seizure disorder (H)    Acquired hypothyroidism    Increased prolactin level    Aftercare following organ transplant    Immunosuppressed status (H)    Kidney replaced by transplant    Anxiety    Vitamin D deficiency    CKD (chronic kidney disease) stage 3, GFR 30-59 ml/min (H)    Bicornate uterus    Ureteral stenosis    Orthostatic hypotension    Tertiary hyperparathyroidism (H)    High-risk pregnancy in second trimester    Dehydration    Stage 3a chronic kidney disease (H)    Anemia of chronic renal failure    Elevated blood pressure affecting pregnancy in second trimester, antepartum     contractions       Allergies   Allergies   Allergen Reactions    Contrast Dye Rash     CT contrast allergy 19 rash over eyes. Need to have pre medication before a CT WITH CONTRAST     Diatrizoate Rash     CT contrast allergy 19 rash over eyes. Need to have pre medication before a CT WITH CONTRAST     Lisinopril Swelling     angioedema    Nitrous Oxide Other (See Comments)     Sense of doom    Chlorhexidine Rash     Rash at site    Sulfa Antibiotics Rash     Muscle stiffness of neck    Dapsone      Methemoglobinemia    Furosemide Other (See Comments)     Skin flushing    Metrogel [Metronidazole]      Hives diffusely on body after vaginal administration.    Azithromycin Dizziness and Rash    Cefuroxime Rash       Medications   Current Outpatient Medications   Medication Sig    aspirin (ASA) 81 MG chewable tablet Take 1 tablet (81 mg) by mouth daily    cyanocobalamin (VITAMIN B-12) 1000  MCG tablet Take 1 tablet (1,000 mcg) by mouth daily    famotidine (PEPCID) 20 MG tablet Take 1 tablet (20 mg) by mouth 2 times daily    levothyroxine (SYNTHROID/LEVOTHROID) 50 MCG tablet Take 1 tablet (50 mcg) by mouth daily    magnesium oxide (MAG-OX) 400 MG tablet Take 2 tablets (800 mg) by mouth 2 times daily    polyethylene glycol (MIRALAX) 17 GM/Dose powder Take 17 g (1 capful) by mouth daily as needed for constipation    simethicone (MYLICON) 125 MG chewable tablet Take 1 tablet (125 mg) by mouth 4 times daily as needed for intestinal gas    tacrolimus (GENERIC EQUIVALENT) 1 MG capsule Take 2 capsules (2 mg) by mouth 2 times daily Total dose = 7 mg twice daily    tacrolimus (GENERIC EQUIVALENT) 5 MG capsule Take 1 capsule (5 mg) by mouth 2 times daily Total dose = 7 mg every AM and 7 mg every PM    acetaminophen (TYLENOL) 325 MG tablet Take 2 tablets (650 mg) by mouth every 4 hours as needed for mild pain    azaTHIOprine (IMURAN) 50 MG tablet Take 3 tablets (150 mg) by mouth daily    Prenatal Vit-Fe Fumarate-FA (PRENATAL MULTIVITAMIN W/IRON) 27-0.8 MG tablet Take 1 tablet by mouth every evening    sodium bicarbonate 650 MG tablet Take 1 tablet (650 mg) by mouth 2 times daily     No current facility-administered medications for this visit.     There are no discontinued medications.    Physical Exam   Vital Signs: /68   Pulse 96   Ht 1.524 m (5')   Wt 77.7 kg (171 lb 3.2 oz)   LMP 12/16/2022   SpO2 97%   BMI 33.44 kg/m      GENERAL APPEARANCE: alert and no distress  HENT: mouth without ulcers or lesions  LYMPHATICS: no cervical or supraclavicular nodes; small ~ 0.5 cm right axillary lymph node, non tender  RESP: lungs clear to auscultation - no rales, rhonchi or wheezes  CV: regular rhythm, normal rate, no rub, 2/6 systolic murmur  EDEMA: no LE edema bilaterally  ABDOMEN: soft, nondistended, nontender, bowel sounds normal  MS: extremities normal - no gross deformities noted, no evidence of  inflammation in joints, no muscle tenderness  SKIN: no rash  TX KIDNEY: normal  DIALYSIS ACCESS:  LUE AV fistula with good thrill      Data         Latest Ref Rng & Units 6/22/2023    10:07 AM 6/19/2023    10:28 AM 6/16/2023     7:23 AM   Renal   Sodium 136 - 145 mmol/L 136  133  135    K 3.4 - 5.3 mmol/L 5.1  5.2  5.0    Cl 98 - 107 mmol/L 103  103  102    Cl (external) 98 - 107 mmol/L 103  103  102    CO2 22 - 29 mmol/L 22  19  19    Urea Nitrogen 6.0 - 20.0 mg/dL 27.6  34.4  34.5    Creatinine 0.51 - 0.95 mg/dL 1.30  1.30  1.19    Glucose 70 - 99 mg/dL 88  89  114    Calcium 8.6 - 10.0 mg/dL 10.1  9.7  9.9    Magnesium 1.7 - 2.3 mg/dL  1.7  1.6          Latest Ref Rng & Units 6/15/2023    10:09 AM 6/12/2023     2:35 AM 6/11/2023     4:14 PM   Bone Health   Phosphorus 2.5 - 4.5 mg/dL 3.6  2.6  3.3          Latest Ref Rng & Units 6/22/2023    10:07 AM 6/19/2023    10:28 AM 6/16/2023     7:23 AM   Heme   WBC 4.0 - 11.0 10e3/uL 9.3  10.9  13.2    Hgb 11.7 - 15.7 g/dL 9.1  9.5  9.2    Plt 150 - 450 10e3/uL 204  235  238          Latest Ref Rng & Units 6/19/2023    10:28 AM 6/16/2023     7:23 AM 6/15/2023    10:09 AM   Liver   AP 35 - 104 U/L 87  84  83    TBili <=1.2 mg/dL 0.5  0.4  0.5    ALT 0 - 50 U/L 8  12  11    AST 0 - 45 U/L 14  14  16    Tot Protein 6.4 - 8.3 g/dL 7.3  7.1  7.2    Albumin 3.5 - 5.2 g/dL 3.8  3.8  4.0          Latest Ref Rng & Units 2/6/2023    10:23 AM 6/22/2022     6:39 AM 8/4/2020     9:05 AM   Pancreas   A1C <5.7 % 5.2   5.5    Lipase 0 - 52 U/L  39           Latest Ref Rng & Units 5/22/2023    10:23 AM 5/22/2023    10:22 AM 4/25/2023    10:35 AM   Iron studies   Iron 37 - 145 ug/dL  80  49    Iron Sat Index 15 - 46 %  41  27    Ferritin 6 - 175 ng/mL 1,618   1,770          Latest Ref Rng & Units 7/7/2021     9:20 AM 6/2/2021     9:25 AM 5/3/2021     9:02 AM   [facility] Txp Virology   BK Spec  Plasma  Plasma  Plasma    BK Res BKNEG^BK Virus DNA Not Detected copies/mL BK Virus DNA Not Detected  BK  Virus DNA Not Detected  BK Virus DNA Not Detected    BK Log <2.7 Log copies/mL Not Calculated  Not Calculated  Not Calculated         Recent Labs   Lab Test 06/12/23  0235 06/13/23  0649 06/15/23  1009 06/19/23  1028 06/22/23  1007   [facility]   --  6/18[facility]2023 6/21/2023   TACROL 25.4*   < > 5.7 6.0 4.8*    < > = values in this interval not displayed.     Recent Labs   Lab Test 08/16/19  0807 09/03/19  0944   DOSMPA Not Provided 0840pm   MPACID [id number]   MPAG 149.2* 52.8       Again, thank you for allowing me to participate in the care of your patient.        Sincerely,        Nakul Hill MD

## 2023-06-20 NOTE — PROGRESS NOTES
Anemia Management Note  SUBJECTIVE/OBJECTIVE:  Referred by Dr. Gelacio Mcintyre on 2023  Primary Diagnosis: Anemia in Chronic Kidney Disease (N18.3, D63.1)   3a  Secondary Diagnosis:  Chronic Kidney Disease, Stage 3 (N18.3)  3a  Hgb goal range:  9-10  Kidney Tx: 8/3/2019  Epo/Darbo: Aranesp 40mcg every 14 days for Hgb <10.0. In Clinic.   Iron regimen:  Prenatal vit with Iron.   *Elevated Ferritin levels.   Labs : 2024  Recent CHARLINE use, transfusion, IV iron: Aranesp .  RX/TX plans : 2024     *Prefers [facility]      Hx of DVT (), High Risk Pregnancy (2nd trimester).     Contact: OK to speak with Stephan Wagner (father) and Savanah Wagner (sister) regarding Medical Information per consent to communicate dated 4/3/2020.        Latest Ref Rng & Units 2023     6:59 AM 2023     4:14 PM 2023     4:32 AM 2023    10:03 AM 2023     7:23 AM 2023    10:28 AM 2023    11:00 AM   Anemia   CHARLINE Dose        40mcg   Hemoglobin 11.7 - 15.7 g/dL 7.9   9.9   10.8   9.8   9.2   9.5        BP Readings from Last 3 Encounters:   23 117/78   23 128/65   06/10/23 129/74     Wt Readings from Last 2 Encounters:   23 78 kg (172 lb)   23 76.8 kg (169 lb 5 oz)           ASSESSMENT:  Hgb:at goal - received dose in clinic - recommend continue current regimen  TSat: not at goal (>30%) but ferritin >1000ng/mL.  PO iron not indicated at this time per anemia protocol.   Ferritin: Elevated (>1000ng/mL)    PLAN:  Dose with aranesp and RTC for hgb then aranesp if needed in 2 week(s)    Orders needed to be renewed (for next follow-up date) in EPIC: None    Iron labs due:  End of Aug 2023    Plan discussed with:  No call, chart review       NEXT FOLLOW-UP DATE:  7/3/23    Manjula Mckinnon RN   Anemia Services  [facility]  [email or web address]   Office : [phone]  Fax: [phone]

## 2023-06-21 LAB
BILE AC SERPL-SCNC: 4.6 UMOL/L
CDCAE SERPL-SCNC: 1.9 UMOL/L
CHOLATE SERPL-SCNC: 2.1 UMOL/L
DO-CHOLATE SERPL-SCNC: 0.3 UMOL/L
URSODEOXYCHOLATE SERPL-SCNC: 0.3 UMOL/L

## 2023-06-22 ENCOUNTER — LAB (OUTPATIENT)
Dept: LAB | Facility: HOSPITAL | Age: 33
End: 2023-06-22
Payer: MEDICARE

## 2023-06-22 DIAGNOSIS — Z94.0 KIDNEY TRANSPLANTED: ICD-10-CM

## 2023-06-22 DIAGNOSIS — E86.0 DEHYDRATION: ICD-10-CM

## 2023-06-22 DIAGNOSIS — M54.9 BACK PAIN, UNSPECIFIED BACK LOCATION, UNSPECIFIED BACK PAIN LATERALITY, UNSPECIFIED CHRONICITY: ICD-10-CM

## 2023-06-22 DIAGNOSIS — Z48.298 AFTERCARE FOLLOWING ORGAN TRANSPLANT: ICD-10-CM

## 2023-06-22 DIAGNOSIS — Z31.69 PRE-CONCEPTION COUNSELING: ICD-10-CM

## 2023-06-22 DIAGNOSIS — Z94.0 KIDNEY REPLACED BY TRANSPLANT: ICD-10-CM

## 2023-06-22 DIAGNOSIS — O09.92 HIGH-RISK PREGNANCY IN SECOND TRIMESTER: ICD-10-CM

## 2023-06-22 LAB
ANION GAP SERPL CALCULATED.3IONS-SCNC: 11 MMOL/L (ref 7–15)
BASOPHILS # BLD AUTO: 0 10E3/UL (ref 0–0.2)
BASOPHILS NFR BLD AUTO: 0 %
BUN SERPL-MCNC: 27.6 MG/DL (ref 6–20)
CALCIUM SERPL-MCNC: 10.1 MG/DL (ref 8.6–10)
CHLORIDE SERPL-SCNC: 103 MMOL/L (ref 98–107)
CREAT SERPL-MCNC: 1.3 MG/DL (ref 0.51–0.95)
DEPRECATED HCO3 PLAS-SCNC: 22 MMOL/L (ref 22–29)
EOSINOPHIL # BLD AUTO: 0.1 10E3/UL (ref 0–0.7)
EOSINOPHIL NFR BLD AUTO: 1 %
ERYTHROCYTE [DISTWIDTH] IN BLOOD BY AUTOMATED COUNT: 14.2 % (ref 10–15)
GFR SERPL CREATININE-BSD FRML MDRD: 56 ML/MIN/1.73M2
GLUCOSE SERPL-MCNC: 88 MG/DL (ref 70–99)
HCT VFR BLD AUTO: 28.8 % (ref 35–47)
HGB BLD-MCNC: 9.1 G/DL (ref 11.7–15.7)
IMM GRANULOCYTES # BLD: 0.1 10E3/UL
IMM GRANULOCYTES NFR BLD: 2 %
LYMPHOCYTES # BLD AUTO: 1 10E3/UL (ref 0.8–5.3)
LYMPHOCYTES NFR BLD AUTO: 11 %
MCH RBC QN AUTO: 30.7 PG (ref 26.5–33)
MCHC RBC AUTO-ENTMCNC: 31.6 G/DL (ref 31.5–36.5)
MCV RBC AUTO: 97 FL (ref 78–100)
MONOCYTES # BLD AUTO: 0.6 10E3/UL (ref 0–1.3)
MONOCYTES NFR BLD AUTO: 7 %
NEUTROPHILS # BLD AUTO: 7.4 10E3/UL (ref 1.6–8.3)
NEUTROPHILS NFR BLD AUTO: 79 %
NRBC # BLD AUTO: 0 10E3/UL
NRBC BLD AUTO-RTO: 0 /100
PLATELET # BLD AUTO: 204 10E3/UL (ref 150–450)
POTASSIUM SERPL-SCNC: 5.1 MMOL/L (ref 3.4–5.3)
RBC # BLD AUTO: 2.96 10E6/UL (ref 3.8–5.2)
SODIUM SERPL-SCNC: 136 MMOL/L (ref 136–145)
TACROLIMUS BLD-MCNC: 4.8 UG/L (ref 5–15)
TME LAST DOSE: ABNORMAL H
TME LAST DOSE: ABNORMAL H
WBC # BLD AUTO: 9.3 10E3/UL (ref 4–11)

## 2023-06-22 PROCEDURE — 85025 COMPLETE CBC W/AUTO DIFF WBC: CPT

## 2023-06-22 PROCEDURE — 80197 ASSAY OF TACROLIMUS: CPT

## 2023-06-22 PROCEDURE — 36415 COLL VENOUS BLD VENIPUNCTURE: CPT

## 2023-06-22 PROCEDURE — 80048 BASIC METABOLIC PNL TOTAL CA: CPT

## 2023-06-22 RX ORDER — AZATHIOPRINE 50 MG/1
150 TABLET ORAL DAILY
Qty: 270 TABLET | Refills: 3 | Status: SHIPPED | OUTPATIENT
Start: 2023-06-22 | End: 2023-08-17

## 2023-06-22 RX ORDER — SODIUM BICARBONATE 650 MG/1
1300 TABLET ORAL 2 TIMES DAILY
Qty: 360 TABLET | Refills: 3 | Status: SHIPPED | OUTPATIENT
Start: 2023-06-22 | End: 2023-06-23

## 2023-06-22 NOTE — TELEPHONE ENCOUNTER
Message to physician:     Date of last visit: 1/16/2023    Date of next visit if scheduled:     Potassium   Date Value Ref Range Status   06/22/2023 5.1 3.4 - 5.3 mmol/L Final   08/02/2022 4.4 3.5 - 5.0 mmol/L Final   07/07/2021 4.2 3.4 - 5.3 mmol/L Final     Creatinine   Date Value Ref Range Status   06/22/2023 1.30 (H) 0.51 - 0.95 mg/dL Final   07/07/2021 0.91 0.52 - 1.04 mg/dL Final     GFR Estimate   Date Value Ref Range Status   06/22/2023 56 (L) >60 mL/min/1.73m2 Final   07/07/2021 86 >60 mL/min/[1.73_m2] Final     Comment:     Non  GFR Calc  Starting 12/18/2018, serum creatinine based estimated GFR (eGFR) will be   calculated using the Chronic Kidney Disease Epidemiology Collaboration   (CKD-EPI) equation.         BP Readings from Last 3 Encounters:   06/20/23 113/68   06/20/23 112/58   06/19/23 117/78       Hemoglobin A1C   Date Value Ref Range Status   02/06/2023 5.2 <5.7 % Final     Comment:     Normal <5.7%   Prediabetes 5.7-6.4%    Diabetes 6.5% or higher     Note: Adopted from ADA consensus guidelines.   08/04/2020 5.5 0 - 5.6 % Final     Comment:     Normal <5.7% Prediabetes 5.7-6.4%  Diabetes 6.5% or higher - adopted from ADA   consensus guidelines.         Please complete refill and CLOSE ENCOUNTER.  Closing the encounter signifies the refill is complete.

## 2023-06-23 DIAGNOSIS — Z94.0 KIDNEY TRANSPLANTED: ICD-10-CM

## 2023-06-23 DIAGNOSIS — O09.92 HIGH-RISK PREGNANCY IN SECOND TRIMESTER: ICD-10-CM

## 2023-06-23 RX ORDER — PRENATAL VIT/IRON FUM/FOLIC AC 27MG-0.8MG
1 TABLET ORAL EVERY EVENING
Qty: 90 TABLET | Refills: 1 | Status: SHIPPED | OUTPATIENT
Start: 2023-06-23 | End: 2023-06-26

## 2023-06-23 RX ORDER — ACETAMINOPHEN 325 MG/1
650 TABLET ORAL EVERY 4 HOURS PRN
Qty: 100 TABLET | Refills: 3 | Status: SHIPPED | OUTPATIENT
Start: 2023-06-23

## 2023-06-23 RX ORDER — SODIUM BICARBONATE 650 MG/1
650 TABLET ORAL 2 TIMES DAILY
Qty: 180 TABLET | Refills: 3 | Status: SHIPPED | OUTPATIENT
Start: 2023-06-23 | End: 2023-07-11

## 2023-06-25 PROBLEM — Q62.10 URETERAL STENOSIS: Status: ACTIVE | Noted: 2020-11-13

## 2023-06-25 NOTE — PATIENT INSTRUCTIONS
Patient Recommendations:  - Stop calcitriol.    Transplant Patient Information  Your Post Transplant Coordinator is: Jennifer Trejo  For non urgent items, we encourage you to contact your coordinator/care team online via [employer]  You and your care team can also contact your transplant coordinator Monday - Friday, 8am - 5pm at [phone] (Option 2 to reach the coordinator or Option 4 to schedule an appointment).  After hours for urgent matters, please call [facility] at [phone].

## 2023-06-25 NOTE — PROGRESS NOTES
TRANSPLANT NEPHROLOGY CHRONIC POST TRANSPLANT VISIT    Assessment & Plan   # DDKT: Stable creatinine, slightly above baseline, but improved from recent CAMERON.  The CAMERON was felt secondary to ureteral obstruction, s/p PNT.  Patient has resolution of dyspnea and leg swelling, as well as apparent post-obstructive diuresis, although repeat ultrasound a couple of days after placement still showed moderate hydronephrosis.   - Baseline Creatinine:  ~ 0.8-1.1   - Proteinuria: Minimal (0.2-0.5 grams)   - Date DSA Last Checked: Sep/2022      Latest DSA: No cPRA: 25%   - BK Viremia: No   - Kidney Tx Biopsy: Sep 17, 2019; Result: No diagnostic evidence of acute rejection.    # Immunosuppression: Tacrolimus immediate release (goal 4-6) and Azathioprine (dose 150 mg daily)   - Continue with intensive monitoring of immunosuppression for efficacy and toxicity.   - Changes: Not at this time, but with risk of significant decrease in tacrolimus level during pregnancy, will need to follow closely and target upper end of goal.    # Infection Prophylaxis:   Last CD4 Level: 201 (Jul/2020)  - PJP: None    # Blood Pressure: Controlled, but low at times;  Goal BP: > 100, but < 130 systolic   - Changes: Not at this time; Patient was on Florinef prior to pregnancy.  Would encourage good hydration.    # Anemia in Chronic Renal Disease: Hgb: Stable      CHARLINE: No   - Iron studies: Replete    # Mineral Bone Disorder: Patient is s/p parathyroidectomy 4/2023 with 3.5 glands removed.  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  - Vitamin D; level: Low        On supplement: No; Consider starting cholecalciferol in the near future.  - Calcium; level: Normal        On supplement: Yes    # Electrolytes:   - Potassium; level: High normal        On supplement: No  - Magnesium; level: Normal        On supplement: Yes  -  Bicarbonate; level: Stable low        On supplement: Yes    # Hyponatremia: Slightly low serum sodium level, possibly due to excess free water intake in the setting of CAMERON and possible post-obstruction diuresis.   - Recommend slightly decreasing free water intake and will follow.    # Ureteral Stenosis/Hydronephrosis: Patient with moderate hydronephrosis of kidney transplant and developed CAMERON.  She underwent PNT 6/9 with repeat kidney transplant ultrasound 6/11 still showing moderate hydronephrosis suggesting this may be a functional hydronephrosis from pregnancy.  However, patient does report resolution of edema and dyspnea, as well as apparent post-obstructive diuresis.  She has a previous h/o ureteral stenosis with ureteral stent removed 2/2021.  Previous kidney transplant ultrasound 7/2021 also showed moderate hydronephrosis unchanged with voiding. Hydronephrosis was unchanged on 7/6/22 abdominal CT.  The persistent moderate hydronephrosis may just be functional at this point.  However, with improvement of symptoms, it appears patient has benefited from recent PNT.  Followed by Urology.   - Would consider repeating kidney transplant ultrasound now with PNT, but not clear if it would change the plan at this time.    # Pregnancy: Patient is 26 weeks 4 days pregnant.  She was recently admitted to rule out preeclampsia and told to not be very active.  Followed by MFM.    # GERD: Controlled on H2 blocler.    # Hyperprolactinemia: Normal prolactin level with last check.  Felt secondary to hypothyroidism versus kidney failure, or less likely now a pituitary microadenoma.  Followed by Endocrinology.    # Right Axillary Lymph Node: Patient was referred to general surgery for excisional biopsy in 8/2022, but decision was made to watch the node.  Node appears to be stable for the last year at least.   - Would consider excisional biopsy post partum.    # Skin Cancer Risk:    - Discussed sun protection and recommend regular  follow up with Dermatology.    # Medical Compliance: Yes    # Health Maintenance and Vaccination Review: Not Reviewed    # Transplant History:  Etiology of Kidney Failure: Unknown etiology (no kidney biopsy)  Tx: DDKT  Transplant: 8/3/2019 (Kidney)  Significant changes in immunosuppression: Changed from mycophenolate to azathioprine for pregnancy.  Significant transplant-related complications: Transplant ureteral stenosis    Transplant Office Phone Number: [phone]    Assessment and plan was discussed with the patient and she voiced her understanding and agreement.    Return visit: Return for previously scheduled visit.    Nakul Hill MD    Chief Complaint   Ms. Wagner is a 32 year old here for kidney transplant and immunosuppression management.    History of Present Illness    Ms. Wagner reports feeling good overall with some medical complaints.  She is now ~ 26 weeks 4 days pregnant.  Patient was recently admitted a couple of weeks ago, initially to rule out preeclampsia and then found to have CAMERON with creatinine up to ~ 1.7.  Kidney transplant ultrasound showed moderate hydronephrosis and patient underwent PNT placement on 6/9.  Repeat kidney transplant ultrasound on 6/11 still showed moderate hydronephrosis.  She reports almost all her urine is coming out of the PNT.  She did get some increase in urine output and resolution of mild leg swelling following the PNT, suggesting there was an obstruction, however.    Her energy level is better after hospitalization, now pretty closer to normal.  She has not been very active and isn't supposed to exercise due to concern for preeclampsia.  Denies any chest pain, but some shortness of breath with exertion, but also better since placement of PNT.  Leg swelling has resolved.    Appetite is good.  No nausea, vomiting or diarrhea.  No fever, sweats or chills, but occasionally will feel clammy if the weather is really warm.  No night sweats.    Home BP: 100/60s with  soem lightheadedness in the morning, but resolves with oral hydration.    Problem List   Patient Active Problem List   Diagnosis     DVT of upper extremity (deep vein thrombosis) (H)     Seizure disorder (H)     Acquired hypothyroidism     Increased prolactin level     Aftercare following organ transplant     Immunosuppressed status (H)     Kidney replaced by transplant     Anxiety     Vitamin D deficiency     CKD (chronic kidney disease) stage 3, GFR 30-59 ml/min (H)     Bicornate uterus     Ureteral stenosis     Orthostatic hypotension     Tertiary hyperparathyroidism (H)     High-risk pregnancy in second trimester     Dehydration     Stage 3a chronic kidney disease (H)     Anemia of chronic renal failure     Elevated blood pressure affecting pregnancy in second trimester, antepartum      contractions       Allergies   Allergies   Allergen Reactions     Contrast Dye Rash     CT contrast allergy 19 rash over eyes. Need to have pre medication before a CT WITH CONTRAST      Diatrizoate Rash     CT contrast allergy 19 rash over eyes. Need to have pre medication before a CT WITH CONTRAST      Lisinopril Swelling     angioedema     Nitrous Oxide Other (See Comments)     Sense of doom     Chlorhexidine Rash     Rash at site     Sulfa Antibiotics Rash     Muscle stiffness of neck     Dapsone      Methemoglobinemia     Furosemide Other (See Comments)     Skin flushing     Metrogel [Metronidazole]      Hives diffusely on body after vaginal administration.     Azithromycin Dizziness and Rash     Cefuroxime Rash       Medications   Current Outpatient Medications   Medication Sig     aspirin (ASA) 81 MG chewable tablet Take 1 tablet (81 mg) by mouth daily     cyanocobalamin (VITAMIN B-12) 1000 MCG tablet Take 1 tablet (1,000 mcg) by mouth daily     famotidine (PEPCID) 20 MG tablet Take 1 tablet (20 mg) by mouth 2 times daily     levothyroxine (SYNTHROID/LEVOTHROID) 50 MCG tablet Take 1 tablet (50 mcg) by  mouth daily     magnesium oxide (MAG-OX) 400 MG tablet Take 2 tablets (800 mg) by mouth 2 times daily     polyethylene glycol (MIRALAX) 17 GM/Dose powder Take 17 g (1 capful) by mouth daily as needed for constipation     simethicone (MYLICON) 125 MG chewable tablet Take 1 tablet (125 mg) by mouth 4 times daily as needed for intestinal gas     tacrolimus (GENERIC EQUIVALENT) 1 MG capsule Take 2 capsules (2 mg) by mouth 2 times daily Total dose = 7 mg twice daily     tacrolimus (GENERIC EQUIVALENT) 5 MG capsule Take 1 capsule (5 mg) by mouth 2 times daily Total dose = 7 mg every AM and 7 mg every PM     acetaminophen (TYLENOL) 325 MG tablet Take 2 tablets (650 mg) by mouth every 4 hours as needed for mild pain     azaTHIOprine (IMURAN) 50 MG tablet Take 3 tablets (150 mg) by mouth daily     Prenatal Vit-Fe Fumarate-FA (PRENATAL MULTIVITAMIN W/IRON) 27-0.8 MG tablet Take 1 tablet by mouth every evening     sodium bicarbonate 650 MG tablet Take 1 tablet (650 mg) by mouth 2 times daily     No current facility-administered medications for this visit.     There are no discontinued medications.    Physical Exam   Vital Signs: /68   Pulse 96   Ht 1.524 m (5')   Wt 77.7 kg (171 lb 3.2 oz)   LMP 12/16/2022   SpO2 97%   BMI 33.44 kg/m      GENERAL APPEARANCE: alert and no distress  HENT: mouth without ulcers or lesions  LYMPHATICS: no cervical or supraclavicular nodes; small ~ 0.5 cm right axillary lymph node, non tender  RESP: lungs clear to auscultation - no rales, rhonchi or wheezes  CV: regular rhythm, normal rate, no rub, 2/6 systolic murmur  EDEMA: no LE edema bilaterally  ABDOMEN: soft, nondistended, nontender, bowel sounds normal  MS: extremities normal - no gross deformities noted, no evidence of inflammation in joints, no muscle tenderness  SKIN: no rash  TX KIDNEY: normal  DIALYSIS ACCESS:  LUE AV fistula with good thrill      Data         Latest Ref Rng & Units 6/22/2023    10:07 AM 6/19/2023    10:28 AM  6/16/2023     7:23 AM   Renal   Sodium 136 - 145 mmol/L 136  133  135    K 3.4 - 5.3 mmol/L 5.1  5.2  5.0    Cl 98 - 107 mmol/L 103  103  102    Cl (external) 98 - 107 mmol/L 103  103  102    CO2 22 - 29 mmol/L 22  19  19    Urea Nitrogen 6.0 - 20.0 mg/dL 27.6  34.4  34.5    Creatinine 0.51 - 0.95 mg/dL 1.30  1.30  1.19    Glucose 70 - 99 mg/dL 88  89  114    Calcium 8.6 - 10.0 mg/dL 10.1  9.7  9.9    Magnesium 1.7 - 2.3 mg/dL  1.7  1.6          Latest Ref Rng & Units 6/15/2023    10:09 AM 6/12/2023     2:35 AM 6/11/2023     4:14 PM   Bone Health   Phosphorus 2.5 - 4.5 mg/dL 3.6  2.6  3.3          Latest Ref Rng & Units 6/22/2023    10:07 AM 6/19/2023    10:28 AM 6/16/2023     7:23 AM   Heme   WBC 4.0 - 11.0 10e3/uL 9.3  10.9  13.2    Hgb 11.7 - 15.7 g/dL 9.1  9.5  9.2    Plt 150 - 450 10e3/uL 204  235  238          Latest Ref Rng & Units 6/19/2023    10:28 AM 6/16/2023     7:23 AM 6/15/2023    10:09 AM   Liver   AP 35 - 104 U/L 87  84  83    TBili <=1.2 mg/dL 0.5  0.4  0.5    ALT 0 - 50 U/L 8  12  11    AST 0 - 45 U/L 14  14  16    Tot Protein 6.4 - 8.3 g/dL 7.3  7.1  7.2    Albumin 3.5 - 5.2 g/dL 3.8  3.8  4.0          Latest Ref Rng & Units 2/6/2023    10:23 AM 6/22/2022     6:39 AM 8/4/2020     9:05 AM   Pancreas   A1C <5.7 % 5.2   5.5    Lipase 0 - 52 U/L  39           Latest Ref Rng & Units 5/22/2023    10:23 AM 5/22/2023    10:22 AM 4/25/2023    10:35 AM   Iron studies   Iron 37 - 145 ug/dL  80  49    Iron Sat Index 15 - 46 %  41  27    Ferritin 6 - 175 ng/mL 1,618   1,770          Latest Ref Rng & Units 7/7/2021     9:20 AM 6/2/2021     9:25 AM 5/3/2021     9:02 AM   [facility] Txp Virology   BK Spec  Plasma  Plasma  Plasma    BK Res BKNEG^BK Virus DNA Not Detected copies/mL BK Virus DNA Not Detected  BK Virus DNA Not Detected  BK Virus DNA Not Detected    BK Log <2.7 Log copies/mL Not Calculated  Not Calculated  Not Calculated         Recent Labs   Lab Test 06/12/23  0235 06/13/23  0649 06/15/23  1009  06/19/23  1028 06/22/23  1007   DOSTA 06-  --   --  6/18/2023 6/21/2023   TACROL 25.4*   < > 5.7 6.0 4.8*    < > = values in this interval not displayed.     Recent Labs   Lab Test 08/16/19  0807 09/03/19  0944   DOSMPA Not Provided 0840pm   MPACID 1.92 3.39   MPAG 149.2* 52.8

## 2023-06-26 ENCOUNTER — LAB (OUTPATIENT)
Dept: LAB | Facility: HOSPITAL | Age: 33
End: 2023-06-26
Payer: MEDICARE

## 2023-06-26 ENCOUNTER — TELEPHONE (OUTPATIENT)
Dept: TRANSPLANT | Facility: CLINIC | Age: 33
End: 2023-06-26

## 2023-06-26 DIAGNOSIS — Z94.0 KIDNEY REPLACED BY TRANSPLANT: ICD-10-CM

## 2023-06-26 DIAGNOSIS — Z48.298 AFTERCARE FOLLOWING ORGAN TRANSPLANT: ICD-10-CM

## 2023-06-26 DIAGNOSIS — Z94.0 KIDNEY REPLACED BY TRANSPLANT: Primary | ICD-10-CM

## 2023-06-26 DIAGNOSIS — E86.0 DEHYDRATION: ICD-10-CM

## 2023-06-26 DIAGNOSIS — R39.15 URINARY URGENCY: ICD-10-CM

## 2023-06-26 LAB
ALBUMIN MFR UR ELPH: 26.2 MG/DL
ALBUMIN UR-MCNC: 20 MG/DL
ANION GAP SERPL CALCULATED.3IONS-SCNC: 8 MMOL/L (ref 7–15)
APPEARANCE UR: CLEAR
BASOPHILS # BLD AUTO: 0.1 10E3/UL (ref 0–0.2)
BASOPHILS NFR BLD AUTO: 1 %
BILIRUB UR QL STRIP: NEGATIVE
BUN SERPL-MCNC: 29 MG/DL (ref 6–20)
CALCIUM SERPL-MCNC: 9.3 MG/DL (ref 8.6–10)
CHLORIDE SERPL-SCNC: 105 MMOL/L (ref 98–107)
COLOR UR AUTO: ABNORMAL
CREAT SERPL-MCNC: 1.34 MG/DL (ref 0.51–0.95)
CREAT UR-MCNC: 41.3 MG/DL
DEPRECATED HCO3 PLAS-SCNC: 22 MMOL/L (ref 22–29)
EOSINOPHIL # BLD AUTO: 0.1 10E3/UL (ref 0–0.7)
EOSINOPHIL NFR BLD AUTO: 1 %
ERYTHROCYTE [DISTWIDTH] IN BLOOD BY AUTOMATED COUNT: 14.1 % (ref 10–15)
GFR SERPL CREATININE-BSD FRML MDRD: 54 ML/MIN/1.73M2
GLUCOSE SERPL-MCNC: 83 MG/DL (ref 70–99)
GLUCOSE UR STRIP-MCNC: NEGATIVE MG/DL
HCT VFR BLD AUTO: 31.2 % (ref 35–47)
HGB BLD-MCNC: 9.9 G/DL (ref 11.7–15.7)
HGB UR QL STRIP: NEGATIVE
IMM GRANULOCYTES # BLD: 0.2 10E3/UL
IMM GRANULOCYTES NFR BLD: 2 %
KETONES UR STRIP-MCNC: NEGATIVE MG/DL
LEUKOCYTE ESTERASE UR QL STRIP: NEGATIVE
LYMPHOCYTES # BLD AUTO: 1 10E3/UL (ref 0.8–5.3)
LYMPHOCYTES NFR BLD AUTO: 10 %
MCH RBC QN AUTO: 30.9 PG (ref 26.5–33)
MCHC RBC AUTO-ENTMCNC: 31.7 G/DL (ref 31.5–36.5)
MCV RBC AUTO: 98 FL (ref 78–100)
MONOCYTES # BLD AUTO: 0.5 10E3/UL (ref 0–1.3)
MONOCYTES NFR BLD AUTO: 5 %
NEUTROPHILS # BLD AUTO: 8.3 10E3/UL (ref 1.6–8.3)
NEUTROPHILS NFR BLD AUTO: 81 %
NITRATE UR QL: NEGATIVE
NRBC # BLD AUTO: 0 10E3/UL
NRBC BLD AUTO-RTO: 0 /100
PH UR STRIP: 7 [PH] (ref 5–7)
PLATELET # BLD AUTO: 225 10E3/UL (ref 150–450)
POTASSIUM SERPL-SCNC: 5.1 MMOL/L (ref 3.4–5.3)
PROT/CREAT 24H UR: 0.63 MG/MG CR (ref 0–0.2)
RBC # BLD AUTO: 3.2 10E6/UL (ref 3.8–5.2)
RBC URINE: <1 /HPF
SODIUM SERPL-SCNC: 135 MMOL/L (ref 136–145)
SP GR UR STRIP: 1.01 (ref 1–1.03)
UROBILINOGEN UR STRIP-MCNC: <2 MG/DL
WBC # BLD AUTO: 10 10E3/UL (ref 4–11)
WBC URINE: <1 /HPF

## 2023-06-26 PROCEDURE — 84156 ASSAY OF PROTEIN URINE: CPT

## 2023-06-26 PROCEDURE — 81003 URINALYSIS AUTO W/O SCOPE: CPT

## 2023-06-26 PROCEDURE — 36415 COLL VENOUS BLD VENIPUNCTURE: CPT

## 2023-06-26 PROCEDURE — 85014 HEMATOCRIT: CPT

## 2023-06-26 PROCEDURE — 80048 BASIC METABOLIC PNL TOTAL CA: CPT

## 2023-06-26 PROCEDURE — 80197 ASSAY OF TACROLIMUS: CPT

## 2023-06-26 NOTE — TELEPHONE ENCOUNTER
Not feeling well  thinks she may have a UTI was hopping to walk in to [facility] lab at 10:00AM  Fax# [phone]  (Recent;y had Nephrostomy tube placed)  please call Mona

## 2023-06-26 NOTE — TELEPHONE ENCOUNTER
RNCC spoke with Mnoa - she has been having   Around PNT site is tender, and experiencing pressure.   No redness, but some it scabbing, about 1/2cm out.     All urine is coming from the PNT.     Mona thinks that baby has moved and she is no longer having urge or     The color of her urine is light, not cloudy. No foul odor.     UA / UC added on to collection 6/26/2023.     Reviewed s/s of UTI.     Drainage was slightly yellowish / pink red.     Keep an eye on it and let us know if even low grade tep.

## 2023-06-27 LAB
TACROLIMUS BLD-MCNC: 5.6 UG/L (ref 5–15)
TME LAST DOSE: NORMAL H
TME LAST DOSE: NORMAL H

## 2023-06-27 NOTE — TELEPHONE ENCOUNTER
RNCC called to follow up on UA  / UC from 6/26.  UA without concern for UTI.  Mona now reports she had a 1x visualization of white sediment when emptying her catheter bag (6/27).  The urine itself is still clear.     Mona is still having a bit of tenderness around her PNT site.  She will remove the dressing and send a picture via MyC for review.  If needed, we can have her see one of the surgical APPs.

## 2023-06-29 ENCOUNTER — OFFICE VISIT (OUTPATIENT)
Dept: MATERNAL FETAL MEDICINE | Facility: CLINIC | Age: 33
DRG: 832 | End: 2023-06-29
Attending: OBSTETRICS & GYNECOLOGY
Payer: MEDICARE

## 2023-06-29 ENCOUNTER — TELEPHONE (OUTPATIENT)
Dept: UROLOGY | Facility: CLINIC | Age: 33
End: 2023-06-29
Payer: MEDICARE

## 2023-06-29 ENCOUNTER — LAB (OUTPATIENT)
Dept: LAB | Facility: CLINIC | Age: 33
DRG: 832 | End: 2023-06-29
Attending: OBSTETRICS & GYNECOLOGY
Payer: MEDICARE

## 2023-06-29 ENCOUNTER — HOSPITAL ENCOUNTER (OUTPATIENT)
Dept: ULTRASOUND IMAGING | Facility: CLINIC | Age: 33
Discharge: HOME | DRG: 832 | End: 2023-06-29
Attending: OBSTETRICS & GYNECOLOGY
Payer: MEDICARE

## 2023-06-29 VITALS
HEART RATE: 78 BPM | BODY MASS INDEX: 33.47 KG/M2 | SYSTOLIC BLOOD PRESSURE: 114 MMHG | WEIGHT: 171.4 LBS | DIASTOLIC BLOOD PRESSURE: 73 MMHG | OXYGEN SATURATION: 98 % | RESPIRATION RATE: 18 BRPM

## 2023-06-29 DIAGNOSIS — N18.30 CKD (CHRONIC KIDNEY DISEASE) STAGE 3, GFR 30-59 ML/MIN (H): ICD-10-CM

## 2023-06-29 DIAGNOSIS — O36.5990 FETAL GROWTH RESTRICTION ANTEPARTUM: ICD-10-CM

## 2023-06-29 DIAGNOSIS — O09.92 HIGH-RISK PREGNANCY IN SECOND TRIMESTER: ICD-10-CM

## 2023-06-29 DIAGNOSIS — Z94.0 CURRENT PREGNANCY IN THIRD TRIMESTER WITH HISTORY OF RENAL TRANSPLANT: ICD-10-CM

## 2023-06-29 DIAGNOSIS — Z94.0 KIDNEY REPLACED BY TRANSPLANT: ICD-10-CM

## 2023-06-29 DIAGNOSIS — N18.30 STAGE 3 CHRONIC KIDNEY DISEASE, UNSPECIFIED WHETHER STAGE 3A OR 3B CKD (H): Primary | ICD-10-CM

## 2023-06-29 DIAGNOSIS — M79.10 MYALGIA: ICD-10-CM

## 2023-06-29 DIAGNOSIS — E86.0 DEHYDRATION: ICD-10-CM

## 2023-06-29 DIAGNOSIS — Z48.298 AFTERCARE FOLLOWING ORGAN TRANSPLANT: ICD-10-CM

## 2023-06-29 DIAGNOSIS — Z13.1 ENCOUNTER FOR SCREENING EXAMINATION FOR IMPAIRED GLUCOSE REGULATION AND DIABETES MELLITUS: ICD-10-CM

## 2023-06-29 DIAGNOSIS — O09.893 CURRENT PREGNANCY IN THIRD TRIMESTER WITH HISTORY OF RENAL TRANSPLANT: ICD-10-CM

## 2023-06-29 LAB
ANION GAP SERPL CALCULATED.3IONS-SCNC: 13 MMOL/L (ref 7–15)
BASOPHILS # BLD AUTO: 0 10E3/UL (ref 0–0.2)
BASOPHILS NFR BLD AUTO: 0 %
BUN SERPL-MCNC: 32.4 MG/DL (ref 6–20)
CALCIUM SERPL-MCNC: 9.5 MG/DL (ref 8.6–10)
CHLORIDE SERPL-SCNC: 105 MMOL/L (ref 98–107)
CREAT SERPL-MCNC: 1.31 MG/DL (ref 0.51–0.95)
DEPRECATED HCO3 PLAS-SCNC: 17 MMOL/L (ref 22–29)
EOSINOPHIL # BLD AUTO: 0.1 10E3/UL (ref 0–0.7)
EOSINOPHIL NFR BLD AUTO: 1 %
ERYTHROCYTE [DISTWIDTH] IN BLOOD BY AUTOMATED COUNT: 14.3 % (ref 10–15)
GFR SERPL CREATININE-BSD FRML MDRD: 56 ML/MIN/1.73M2
GLUCOSE 1H P 50 G GLC PO SERPL-MCNC: 151 MG/DL (ref 70–129)
GLUCOSE SERPL-MCNC: 87 MG/DL (ref 70–99)
HCT VFR BLD AUTO: 29.6 % (ref 35–47)
HGB BLD-MCNC: 9.4 G/DL (ref 11.7–15.7)
IMM GRANULOCYTES # BLD: 0.1 10E3/UL
IMM GRANULOCYTES NFR BLD: 1 %
LYMPHOCYTES # BLD AUTO: 0.9 10E3/UL (ref 0.8–5.3)
LYMPHOCYTES NFR BLD AUTO: 9 %
MAGNESIUM SERPL-MCNC: 2 MG/DL (ref 1.7–2.3)
MCH RBC QN AUTO: 31.1 PG (ref 26.5–33)
MCHC RBC AUTO-ENTMCNC: 31.8 G/DL (ref 31.5–36.5)
MCV RBC AUTO: 98 FL (ref 78–100)
MONOCYTES # BLD AUTO: 0.5 10E3/UL (ref 0–1.3)
MONOCYTES NFR BLD AUTO: 5 %
NEUTROPHILS # BLD AUTO: 7.9 10E3/UL (ref 1.6–8.3)
NEUTROPHILS NFR BLD AUTO: 84 %
NRBC # BLD AUTO: 0 10E3/UL
NRBC BLD AUTO-RTO: 0 /100
PLATELET # BLD AUTO: 228 10E3/UL (ref 150–450)
POTASSIUM SERPL-SCNC: 5.2 MMOL/L (ref 3.4–5.3)
RBC # BLD AUTO: 3.02 10E6/UL (ref 3.8–5.2)
SODIUM SERPL-SCNC: 135 MMOL/L (ref 136–145)
TACROLIMUS BLD-MCNC: 5 UG/L (ref 5–15)
TME LAST DOSE: NORMAL H
TME LAST DOSE: NORMAL H
WBC # BLD AUTO: 9.6 10E3/UL (ref 4–11)

## 2023-06-29 PROCEDURE — 59025 FETAL NON-STRESS TEST: CPT | Mod: 26 | Performed by: OBSTETRICS & GYNECOLOGY

## 2023-06-29 PROCEDURE — G0463 HOSPITAL OUTPT CLINIC VISIT: HCPCS | Mod: 25 | Performed by: OBSTETRICS & GYNECOLOGY

## 2023-06-29 PROCEDURE — 83735 ASSAY OF MAGNESIUM: CPT

## 2023-06-29 PROCEDURE — 76820 UMBILICAL ARTERY ECHO: CPT | Mod: 26 | Performed by: OBSTETRICS & GYNECOLOGY

## 2023-06-29 PROCEDURE — 59025 FETAL NON-STRESS TEST: CPT | Performed by: OBSTETRICS & GYNECOLOGY

## 2023-06-29 PROCEDURE — 76816 OB US FOLLOW-UP PER FETUS: CPT

## 2023-06-29 PROCEDURE — 76820 UMBILICAL ARTERY ECHO: CPT

## 2023-06-29 PROCEDURE — 82950 GLUCOSE TEST: CPT

## 2023-06-29 PROCEDURE — 99214 OFFICE O/P EST MOD 30 MIN: CPT | Mod: 25 | Performed by: OBSTETRICS & GYNECOLOGY

## 2023-06-29 PROCEDURE — 36415 COLL VENOUS BLD VENIPUNCTURE: CPT

## 2023-06-29 PROCEDURE — 85004 AUTOMATED DIFF WBC COUNT: CPT

## 2023-06-29 PROCEDURE — 86780 TREPONEMA PALLIDUM: CPT | Performed by: OBSTETRICS & GYNECOLOGY

## 2023-06-29 PROCEDURE — 80197 ASSAY OF TACROLIMUS: CPT

## 2023-06-29 PROCEDURE — 59025 FETAL NON-STRESS TEST: CPT

## 2023-06-29 PROCEDURE — 80048 BASIC METABOLIC PNL TOTAL CA: CPT

## 2023-06-29 PROCEDURE — 76816 OB US FOLLOW-UP PER FETUS: CPT | Mod: 26 | Performed by: OBSTETRICS & GYNECOLOGY

## 2023-06-29 NOTE — PROGRESS NOTES
2-week postop visit status post resection 3-1/2 parathyroid glands.  Remaining parathyroid gland is one half of the left inferior.  20-minute post excision 3-1/2 gland parathyroid resection parathyroid hormone level was 19.    The patient was operated on at week 19 of her pregnancy.  She has been followed closely by OB and has been doing quite well.    Since the surgery the patient has no problems with voice quality inspiration or swallowing.  No symptoms of hypocalcemia.    On physical exam the wound is healing well the Dermabond was removed and the sutures were clipped at the skin edges.  There is no evidence of hematoma seroma or infection.    I reviewed with the patient her intraoperative parathyroid hormone levels and postoperative labs as well as pathology.    Plan:  Reviewed with the patient wound management will massage  She is being followed closely by OB and endocrinology who will continue doing so.    Today we will check a calcium vitamin D and parathyroid hormone and discussed with her other caregivers as to what is the best plan and treatment.    Melissa Jones MD

## 2023-06-29 NOTE — PROGRESS NOTES
"Maternal-Fetal Medicine OB Follow Up Visit    Mona Wagner  : 1990  MRN: [id number]    CC: OB Follow-up    Subjective:  Mona Wagner is a 32 year old  at 27w6d x LMP c/w 8w4d sono presenting for OB follow-up.     Today, she is here alone.    She is overall feeling well since her last visit with us 23.  She does feel some pelvic pressure at times but denies regular, painful contractions, vaginal bleeding, LOF.  Active fetal movement.  Nephrostomy is functioning well.  She has some residual leg cramping but overall is stable if not improved.  She has resolution of itching of the top of her hands and feet.  Denies vaginal discharge. Denies any s/s of UTI or bladder infection. No fevers, flank pain, or unusual color or odor to her urine.  No additional concerns.  She is not on an outpatient BP medication.    OB Hx:  OB History    Para Term  AB Living   1 0 0 0 0 0   SAB IAB Ectopic Multiple Live Births   0 0 0 0 0      # Outcome Date GA Lbr Robinson/2nd Weight Sex Delivery Anes PTL Lv   1 Current                Objective:  /73 (BP Location: Right arm, Patient Position: Semi-Carrasco's, Cuff Size: Adult Regular)   Pulse 78   Resp 18   Wt 77.7 kg (171 lb 6.4 oz)   LMP 2022   SpO2 98%   BMI 33.47 kg/m      Gen: alert, oriented  Skin: warm, dry, intact  Heart:  Regular rate  Respiratory: breathing unlabored  Abdominal: gravid, non-tender, nephrostomy insertion site covered with bandage c/d/i. Urine flowing without obstruction to collection bag.   Extremities: no edema, AV fistula in place on left forearm.  Psych: mood WNL, behavior WNL    OB Ultrasound:  Please see \"imaging\" tab under chart review for today's ultrasound results.    Assessment/Plan:  32 year old  at 27w6d here for follow OB visit.    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 Mcintyre in transplant nephrology. Resolution of cHTN s/p transplant.  Next visit 7/24/23  - Nephrostomy tube placed on 6/9 with subsequent hyperkalemia due to profound post obstruction diuresis after placement. Resolution of electrolyte imbalance noted.  - CBC, BMP, tacro level drawn today.  Creatinine stable at 1.31 from 1.34; electrolytes stable with Na 135 and potassium 5.2  - Continue with Tacrolimus (goal 4-6) and Azathioprine as prescribed by nephrology. Tacro level pending today  - Baseline Cr 0.8-1.1  - urine protein/creatinine to be obtained tomorrow with UA  - Close management of blood pressure with goal of <130/90  - Early detection and treatment of asymptomatic bacteriuria with urine culture at least every trimester. UC last on 6/8 and WNL.  - Urology nurse visit tomorrow, visit 7/28  - Nephrology next visit 7/24     Hypothyroidism:  Secondary Hyperparathyroidism with hypercalcemia   - Resection of parathyroid glands on 4/28.   - Follows with [facility]. Continue current levothyroxine prescription 37.5 mcg daily.  - 6/16/23 TSH 2.51  - Endocrinology next visit 7/28     Hx DVT:  - Assessment for inherited thrombophilias including Factor V Leiden and Prothrombin Gene Mutation:  Negative.  - Additional hypercoagulability work up negative.     Hx of bacterial endocarditis:  - Echo on 3/20:  Interpretation Summary  Global and regional left ventricular function is hyperkinetic with an EF >70%.  Right ventricular function, chamber size, wall motion, and thickness are  normal.  Pulmonary artery systolic pressure is normal.  The inferior  vena cava cannot be assessed.  No pericardial effusion is present.     Routine PNC:  - Prenatal labs:               Rh: +  antibody: neg               HepB/HIV/RPR/HepC: nonreactive               GC/CT: neg               Rubella: non-immune  Varicella: non-immune               GCT: passed early GCT. Will repeat today               UC: no growth               Pap: NIL and HPV neg  - Immunizations: s/p Flu and Covid. TDAP in 2 weeks per her request  - Genetic screening: NIPT low-risk. Low-risk NT   - PTL s/s reviewed to help differentiate from typical pregnancy symptoms.       Surveillance:  FGR AC 9% with normal UAR:   - Serial growth US q3 weeks  - Weekly NST with UAR     Delivery Planning:  - Timing of delivery will be indicated by secondary sequelae and fetal FGR, but generally 37-39 weeks.  reserved for usual obstetrical indications.   - Labor analgesia: will discuss at later date  - Feeding: plans to formula feed  - Contraception: needs to be discussed    RTC in 1 week for  surveillance, 2 weeks for  surveillance and OB visit.  1 hr GCT today, urine studies tomorrow.    Patient seen with Dr. Zoë Montiel MD   Maternal-Fetal Medicine Fellow, PGY6  2023 1:41 PM      Physician Attestation   I saw this patient with the fellow and agree with the resident/fellow's findings and plan of care as documented in the note.        30 MINUTES SPENT BY ME on the date of service doing chart review, history, exam, documentation & further activities per the note.      Nancy Camp MD  Date of Service (when I saw the patient): 23

## 2023-06-29 NOTE — CONFIDENTIAL NOTE
Spoke with patient who states that her symptoms have largely resolved with increased fluid intake.  Patient states that her boyfriend feels that the tube may have been pulled.  Patient is still having urine output from tube.  Patient will have nurse visit with this author tomorrow for PNT check and dressing change as well as education on care of PNT.  Dr. Britt will be available as needed.  This author's direct line provided for future questions/concerns.    Jericho Miller, RN  RN Care Coordinator - Urology

## 2023-06-29 NOTE — PATIENT INSTRUCTIONS
Kick Counts  It s normal to worry about your baby s health. One way you can know your baby s doing well is to record the baby s movements once a day. This is called a kick count.   Remember to take your kick count records to all your appointments with your healthcare provider.  How to count kicks    Time how long it takes you to feel 10 kicks, flutters, swishes, or rolls. Ideally, you want to feel at least 10 movements in 2 hours. You will likely feel 10 movements in less time than that.  Starting at 28 weeks, count your baby's movements daily. Follow your healthcare provider's instructions for kick counting. Here are tips for counting kicks:    Choose a time when the baby is active, such as after a meal.     Sit comfortably or lie on your side.     The first time the baby moves, write down the time.     Count each movement until the baby has moved  10 times. This can take from 20 minutes to 2 hours.     If you haven't felt 10 kicks by the end of the second hour, wait a few hours. Then try again.    Try to do it at the same time each day.  When to call your healthcare provider  Call your healthcare provider  right away if:    You do a couple sets of kick counts during the day and your baby moves fewer than 10 times in 2 hours.    Your baby moves much less often than on the days before.    You haven't felt your baby move all day.  [employer] last reviewed this educational content on 8/1/2020 2000-2022 [employer]. All rights reserved. This information is not intended as a substitute for professional medical care. Always follow your healthcare professional's instructions.        Counting Your Baby's Movements   Counting your unborn baby's movements will assure you of your baby's health.   The best time to count is when your baby is most active. Do it at the same time every day.  To keep track of your baby's movements, use the attached chart. Bring the chart with you to each clinic visit.  1. Do not  smoke for at least 2 hours before counting your baby's movements. (In fact, it's best to quit smoking if you're pregnant.)  2. Lie on your side. Put your hand on your baby.  3. Write the time you start counting movements.  4. Count the next 10 kicks, rolls, and other movements.  5. Write the time when your baby has finished 10 movements.  6. If you reach 10 movements within 2 hours, you're done for the day.  Call your care provider right away if:    You feel no movement for the first hour.    It takes more than 2 hours to feel 10 movements.    There's a change in the normal pattern of movements.  Your care provider's phone number is:   ________________________________________  The number to your \[facility] [place] [facility]\"" center is:   ________________________________________     For informational purposes only. Not to replace the advice of your health care provider. Copyright   ,  [place] [facility]. All rights reserved. Clinically reviewed by Etta Bowen RN, [facility]. [place] [id number] - REV 10/21.       Understanding  Labor  Going into labor before week 37 of pregnancy is called  labor.  labor can cause your baby to be born too soon. This can lead to health problems for your baby.     Before labor, the cervix is thick and closed.      In  labor, the cervix begins to efface (thin) and dilate (open).     Symptoms of  labor  If you think you re having  labor, get medical help right away. Contractions alone don t mean you re in  labor. What matters more are changes in your cervix. The cervix is the opening at the lower end of the uterus. Symptoms of  labor include:    4 or more contractions per hour    Strong contractions    Constant menstrual-like cramping    Low-back pain    Mucous or bloody fluid from the vagina    Bleeding or spotting in the second or third trimester  Evaluating  labor  Your healthcare provider will try to find  out if you re in  labor or just having contractions. They may watch you for a few hours. You may have these tests:    Pelvic exam. This is to see if your cervix has effaced (thinned) and dilated (opened).    Uterine activity monitoring. This is used to detect contractions.    Fetal monitoring. This is done to check the health of your baby.    Ultrasound. This test looks at your baby s size and position.    Amniocentesis. This test checks how mature your baby s lungs are.  Caring for yourself at home  If you have  contractions, but your cervix is still thick and closed, your healthcare provider may tell you to:    Drink plenty of water.    Do fewer activities.    Rest in bed on your side.    Don't have intercourse or stimulate your nipples.  When to call your healthcare provider  Call your healthcare provider if you have any of these:    4 or more contractions per hour    Bag of water breaks    Bleeding or spotting  If you need hospital care   labor often means that you need hospital care. You may need complete bed rest. You may have an IV (intravenous) line in your arm or hand. This is to give you fluids. You may be given pills or injections. These are done to help prevent contractions. You may get a medicine called a corticosteroid. This is to help your baby s lungs mature more quickly.  Are you at risk?  Any pregnant woman can have  labor. It may start for no reason. But these risk factors can increase your chances:    Past  labor or early birth    Smoking, drug, or alcohol use in pregnancy    A multiple pregnancy (twins or more)    Problems with the shape of the uterus    Bleeding during the pregnancy  The dangers of  birth  A baby born too soon may have health problems. This is because the baby didn t have enough time to grow. Some of the risks for your baby include:    Not breastfeeding or feeding well    Having immature lungs    Bleeding in the brain    Death  Reaching  term  Your goal is to get as close to term (week 37 or later) as you can before giving birth. The closer you get to term, the higher your chance of having a healthy baby. Work with your healthcare provider. Together, you can take steps that may keep you from giving birth too early.  [employer] last reviewed this educational content on 10/1/2021    8633-6326 [employer]. All rights reserved. This information is not intended as a substitute for professional medical care. Always follow your healthcare professional's instructions.          Pregnancy: Your Third Trimester Changes  As the baby grows, your body changes, too. You may also see signs that your body is getting ready for labor. Be patient. Within a few more weeks, your baby will be born.   How you are changing  Your body is preparing for the birth of your baby. Some of the most common changes are listed below. If you have any questions or concerns, ask your healthcare provider:     You ll gain more weight from fluids, extra blood, and fat deposits.    Your breasts will grow as your body gets ready to feed the baby. They may be more tender. You may also notice a slight yellow or white discharge from the nipples.    Discharge from your vagina may increase. This is normal.    You might see some skin color changes on your forehead, cheeks, or nose. Most of these will go away after you deliver.  How your baby is growing  Month 7  Your baby can open and close their eyes and weighs around 4 pounds (1.8 kg). If born prematurely (too early), your baby would likely survive with special care.     Month 8  Your baby is building up body fat and weighs around 6 pounds (2.7 kg).    Month 9  Your baby weighs nearly 7 pounds (3.2 kg) and is about 19 to 21 inches long. In other words, any day now...     [employer] last reviewed this educational content on 9/1/2021 2000-2022 [employer]. All rights reserved. This information is not intended as a  substitute for professional medical care. Always follow your healthcare professional's instructions.

## 2023-06-30 ENCOUNTER — ALLIED HEALTH/NURSE VISIT (OUTPATIENT)
Dept: UROLOGY | Facility: CLINIC | Age: 33
End: 2023-06-30
Payer: MEDICARE

## 2023-06-30 DIAGNOSIS — N13.5 STRICTURE OR KINKING OF URETER: Primary | ICD-10-CM

## 2023-06-30 LAB
ALBUMIN MFR UR ELPH: 6.2 MG/DL
ALBUMIN UR-MCNC: NEGATIVE MG/DL
APPEARANCE UR: ABNORMAL
BACTERIA #/AREA URNS HPF: ABNORMAL /HPF
BILIRUB UR QL STRIP: NEGATIVE
COLOR UR AUTO: ABNORMAL
CREAT UR-MCNC: 15.7 MG/DL
GLUCOSE UR STRIP-MCNC: NEGATIVE MG/DL
HGB UR QL STRIP: NEGATIVE
KETONES UR STRIP-MCNC: NEGATIVE MG/DL
LEUKOCYTE ESTERASE UR QL STRIP: ABNORMAL
NITRATE UR QL: NEGATIVE
PH UR STRIP: 6.5 [PH] (ref 5–7)
PROT/CREAT 24H UR: 0.39 MG/MG CR (ref 0–0.2)
RBC URINE: <1 /HPF
SP GR UR STRIP: 1 (ref 1–1.03)
T PALLIDUM AB SER QL: NONREACTIVE
UROBILINOGEN UR STRIP-MCNC: NORMAL MG/DL
WBC URINE: 6 /HPF

## 2023-06-30 PROCEDURE — 87086 URINE CULTURE/COLONY COUNT: CPT | Performed by: UROLOGY

## 2023-06-30 PROCEDURE — 99207 PR NO CHARGE LOS: CPT

## 2023-06-30 PROCEDURE — 81001 URINALYSIS AUTO W/SCOPE: CPT | Performed by: PATHOLOGY

## 2023-06-30 PROCEDURE — 84156 ASSAY OF PROTEIN URINE: CPT | Performed by: PATHOLOGY

## 2023-06-30 PROCEDURE — 99000 SPECIMEN HANDLING OFFICE-LAB: CPT | Performed by: PATHOLOGY

## 2023-06-30 NOTE — PROGRESS NOTES
Urine collected from PNT for UA and random protein ordered by OB/GYN.  Also assessed PNT, which is draining well, with no s/s infection around insertion site.  Urine in collection bag was cloudy straw colored.  Patient reported no other s/s of urinary problems. Dressing changed and patient instructed on dressing changes. Supplies provided

## 2023-07-02 ENCOUNTER — HOSPITAL ENCOUNTER (INPATIENT)
Facility: CLINIC | Age: 33
LOS: 3 days | Discharge: HOME | DRG: 832 | End: 2023-07-05
Attending: OBSTETRICS & GYNECOLOGY | Admitting: OBSTETRICS & GYNECOLOGY
Payer: MEDICARE

## 2023-07-02 DIAGNOSIS — N12 PYELONEPHRITIS: Primary | ICD-10-CM

## 2023-07-02 DIAGNOSIS — O09.92 HIGH-RISK PREGNANCY IN SECOND TRIMESTER: ICD-10-CM

## 2023-07-02 PROBLEM — Z36.89 ENCOUNTER FOR TRIAGE IN PREGNANT PATIENT: Status: ACTIVE | Noted: 2023-07-02

## 2023-07-02 LAB
ABO/RH(D): NORMAL
ALBUMIN SERPL BCG-MCNC: 3.6 G/DL (ref 3.5–5.2)
ALBUMIN UR-MCNC: 50 MG/DL
ALP SERPL-CCNC: 100 U/L (ref 35–104)
ALT SERPL W P-5'-P-CCNC: 11 U/L (ref 0–50)
ANION GAP SERPL CALCULATED.3IONS-SCNC: 11 MMOL/L (ref 7–15)
ANTIBODY SCREEN: NEGATIVE
APPEARANCE UR: ABNORMAL
AST SERPL W P-5'-P-CCNC: 12 U/L (ref 0–45)
BACTERIA #/AREA URNS HPF: ABNORMAL /HPF
BILIRUB SERPL-MCNC: 0.8 MG/DL
BILIRUB UR QL STRIP: NEGATIVE
BUN SERPL-MCNC: 27.9 MG/DL (ref 6–20)
CALCIUM SERPL-MCNC: 8.9 MG/DL (ref 8.6–10)
CHLORIDE SERPL-SCNC: 103 MMOL/L (ref 98–107)
COLOR UR AUTO: YELLOW
CREAT SERPL-MCNC: 1.47 MG/DL (ref 0.51–0.95)
DEPRECATED HCO3 PLAS-SCNC: 18 MMOL/L (ref 22–29)
ERYTHROCYTE [DISTWIDTH] IN BLOOD BY AUTOMATED COUNT: 14.1 % (ref 10–15)
GFR SERPL CREATININE-BSD FRML MDRD: 49 ML/MIN/1.73M2
GLUCOSE SERPL-MCNC: 122 MG/DL (ref 70–99)
GLUCOSE UR STRIP-MCNC: NEGATIVE MG/DL
HCT VFR BLD AUTO: 27.1 % (ref 35–47)
HGB BLD-MCNC: 8.7 G/DL (ref 11.7–15.7)
HGB UR QL STRIP: ABNORMAL
KETONES UR STRIP-MCNC: NEGATIVE MG/DL
LACTATE SERPL-SCNC: 1 MMOL/L (ref 0.7–2)
LEUKOCYTE ESTERASE UR QL STRIP: ABNORMAL
MCH RBC QN AUTO: 31.4 PG (ref 26.5–33)
MCHC RBC AUTO-ENTMCNC: 32.1 G/DL (ref 31.5–36.5)
MCV RBC AUTO: 98 FL (ref 78–100)
NITRATE UR QL: POSITIVE
PH UR STRIP: 5.5 [PH] (ref 5–7)
PLATELET # BLD AUTO: 176 10E3/UL (ref 150–450)
POTASSIUM SERPL-SCNC: 4.7 MMOL/L (ref 3.4–5.3)
PROT SERPL-MCNC: 7.2 G/DL (ref 6.4–8.3)
RBC # BLD AUTO: 2.77 10E6/UL (ref 3.8–5.2)
RBC URINE: 2 /HPF
SODIUM SERPL-SCNC: 132 MMOL/L (ref 136–145)
SP GR UR STRIP: 1.01 (ref 1–1.03)
SPECIMEN EXPIRATION DATE: NORMAL
UROBILINOGEN UR STRIP-MCNC: NORMAL MG/DL
WBC # BLD AUTO: 11.5 10E3/UL (ref 4–11)
WBC CLUMPS #/AREA URNS HPF: PRESENT /HPF
WBC URINE: 13 /HPF

## 2023-07-02 PROCEDURE — 250N000013 HC RX MED GY IP 250 OP 250 PS 637

## 2023-07-02 PROCEDURE — 86850 RBC ANTIBODY SCREEN: CPT

## 2023-07-02 PROCEDURE — 80053 COMPREHEN METABOLIC PANEL: CPT

## 2023-07-02 PROCEDURE — 120N000002 HC R&B MED SURG/OB UMMC

## 2023-07-02 PROCEDURE — 83605 ASSAY OF LACTIC ACID: CPT

## 2023-07-02 PROCEDURE — 85027 COMPLETE CBC AUTOMATED: CPT

## 2023-07-02 PROCEDURE — 86901 BLOOD TYPING SEROLOGIC RH(D): CPT

## 2023-07-02 PROCEDURE — 36415 COLL VENOUS BLD VENIPUNCTURE: CPT

## 2023-07-02 PROCEDURE — 999N000127 HC STATISTIC PERIPHERAL IV START W US GUIDANCE

## 2023-07-02 PROCEDURE — C9803 HOPD COVID-19 SPEC COLLECT: HCPCS

## 2023-07-02 PROCEDURE — 250N000011 HC RX IP 250 OP 636: Mod: JZ

## 2023-07-02 PROCEDURE — 250N000012 HC RX MED GY IP 250 OP 636 PS 637

## 2023-07-02 PROCEDURE — 87077 CULTURE AEROBIC IDENTIFY: CPT

## 2023-07-02 PROCEDURE — 258N000003 HC RX IP 258 OP 636

## 2023-07-02 PROCEDURE — G0463 HOSPITAL OUTPT CLINIC VISIT: HCPCS

## 2023-07-02 PROCEDURE — 87149 DNA/RNA DIRECT PROBE: CPT

## 2023-07-02 PROCEDURE — 999N000040 HC STATISTIC CONSULT NO CHARGE VASC ACCESS

## 2023-07-02 PROCEDURE — 81001 URINALYSIS AUTO W/SCOPE: CPT

## 2023-07-02 RX ORDER — ASPIRIN 81 MG/1
81 TABLET, CHEWABLE ORAL EVERY 24 HOURS
Status: DISCONTINUED | OUTPATIENT
Start: 2023-07-02 | End: 2023-07-05 | Stop reason: HOSPADM

## 2023-07-02 RX ORDER — SIMETHICONE 125 MG
125 TABLET,CHEWABLE ORAL 4 TIMES DAILY PRN
Status: DISCONTINUED | OUTPATIENT
Start: 2023-07-02 | End: 2023-07-05 | Stop reason: HOSPADM

## 2023-07-02 RX ORDER — CEFTRIAXONE 2 G/1
2 INJECTION, POWDER, FOR SOLUTION INTRAMUSCULAR; INTRAVENOUS ONCE
Status: COMPLETED | OUTPATIENT
Start: 2023-07-02 | End: 2023-07-02

## 2023-07-02 RX ORDER — TACROLIMUS 5 MG/1
5 CAPSULE ORAL EVERY 12 HOURS
Status: DISCONTINUED | OUTPATIENT
Start: 2023-07-02 | End: 2023-07-05 | Stop reason: HOSPADM

## 2023-07-02 RX ORDER — FAMOTIDINE 20 MG/1
20 TABLET, FILM COATED ORAL 3 TIMES DAILY PRN
Status: DISCONTINUED | OUTPATIENT
Start: 2023-07-02 | End: 2023-07-05 | Stop reason: HOSPADM

## 2023-07-02 RX ORDER — TACROLIMUS 5 MG/1
5 CAPSULE ORAL 2 TIMES DAILY
Status: DISCONTINUED | OUTPATIENT
Start: 2023-07-02 | End: 2023-07-02

## 2023-07-02 RX ORDER — POLYETHYLENE GLYCOL 3350 17 G/17G
17 POWDER, FOR SOLUTION ORAL DAILY PRN
Status: DISCONTINUED | OUTPATIENT
Start: 2023-07-02 | End: 2023-07-05 | Stop reason: HOSPADM

## 2023-07-02 RX ORDER — TACROLIMUS 1 MG/1
2 CAPSULE ORAL EVERY 12 HOURS
Status: DISCONTINUED | OUTPATIENT
Start: 2023-07-02 | End: 2023-07-05 | Stop reason: HOSPADM

## 2023-07-02 RX ORDER — FAMOTIDINE 20 MG/1
20 TABLET, FILM COATED ORAL 2 TIMES DAILY
Status: DISCONTINUED | OUTPATIENT
Start: 2023-07-02 | End: 2023-07-02

## 2023-07-02 RX ORDER — LANOLIN ALCOHOL/MO/W.PET/CERES
1000 CREAM (GRAM) TOPICAL DAILY
Status: DISCONTINUED | OUTPATIENT
Start: 2023-07-03 | End: 2023-07-02

## 2023-07-02 RX ORDER — TACROLIMUS 1 MG/1
2 CAPSULE ORAL 2 TIMES DAILY
Status: DISCONTINUED | OUTPATIENT
Start: 2023-07-02 | End: 2023-07-02

## 2023-07-02 RX ORDER — AZATHIOPRINE 50 MG/1
150 TABLET ORAL EVERY EVENING
Status: DISCONTINUED | OUTPATIENT
Start: 2023-07-02 | End: 2023-07-05 | Stop reason: HOSPADM

## 2023-07-02 RX ORDER — PROCHLORPERAZINE MALEATE 5 MG
10 TABLET ORAL EVERY 6 HOURS PRN
Status: DISCONTINUED | OUTPATIENT
Start: 2023-07-02 | End: 2023-07-05 | Stop reason: HOSPADM

## 2023-07-02 RX ORDER — METOCLOPRAMIDE 10 MG/1
10 TABLET ORAL EVERY 6 HOURS PRN
Status: DISCONTINUED | OUTPATIENT
Start: 2023-07-02 | End: 2023-07-05 | Stop reason: HOSPADM

## 2023-07-02 RX ORDER — PROCHLORPERAZINE 25 MG
25 SUPPOSITORY, RECTAL RECTAL EVERY 12 HOURS PRN
Status: DISCONTINUED | OUTPATIENT
Start: 2023-07-02 | End: 2023-07-05 | Stop reason: HOSPADM

## 2023-07-02 RX ORDER — DIPHENHYDRAMINE HYDROCHLORIDE 50 MG/ML
25 INJECTION INTRAMUSCULAR; INTRAVENOUS EVERY 6 HOURS PRN
Status: DISCONTINUED | OUTPATIENT
Start: 2023-07-02 | End: 2023-07-05 | Stop reason: HOSPADM

## 2023-07-02 RX ORDER — LIDOCAINE 40 MG/G
CREAM TOPICAL
Status: DISCONTINUED | OUTPATIENT
Start: 2023-07-02 | End: 2023-07-05 | Stop reason: HOSPADM

## 2023-07-02 RX ORDER — SODIUM CHLORIDE, SODIUM LACTATE, POTASSIUM CHLORIDE, CALCIUM CHLORIDE 600; 310; 30; 20 MG/100ML; MG/100ML; MG/100ML; MG/100ML
INJECTION, SOLUTION INTRAVENOUS
Status: DISCONTINUED
Start: 2023-07-02 | End: 2023-07-02 | Stop reason: HOSPADM

## 2023-07-02 RX ORDER — DIPHENHYDRAMINE HCL 25 MG
25 CAPSULE ORAL EVERY 6 HOURS PRN
Status: DISCONTINUED | OUTPATIENT
Start: 2023-07-02 | End: 2023-07-05 | Stop reason: HOSPADM

## 2023-07-02 RX ORDER — METOCLOPRAMIDE HYDROCHLORIDE 5 MG/ML
10 INJECTION INTRAMUSCULAR; INTRAVENOUS EVERY 6 HOURS PRN
Status: DISCONTINUED | OUTPATIENT
Start: 2023-07-02 | End: 2023-07-05 | Stop reason: HOSPADM

## 2023-07-02 RX ORDER — MAGNESIUM OXIDE 400 MG/1
800 TABLET ORAL 2 TIMES DAILY
Status: DISCONTINUED | OUTPATIENT
Start: 2023-07-02 | End: 2023-07-05 | Stop reason: HOSPADM

## 2023-07-02 RX ORDER — SODIUM BICARBONATE 650 MG/1
650 TABLET ORAL 2 TIMES DAILY
Status: DISCONTINUED | OUTPATIENT
Start: 2023-07-02 | End: 2023-07-05 | Stop reason: HOSPADM

## 2023-07-02 RX ORDER — LANOLIN ALCOHOL/MO/W.PET/CERES
1000 CREAM (GRAM) TOPICAL EVERY 24 HOURS
Status: DISCONTINUED | OUTPATIENT
Start: 2023-07-02 | End: 2023-07-05 | Stop reason: HOSPADM

## 2023-07-02 RX ORDER — ASPIRIN 81 MG/1
81 TABLET, CHEWABLE ORAL DAILY
Status: DISCONTINUED | OUTPATIENT
Start: 2023-07-03 | End: 2023-07-02

## 2023-07-02 RX ORDER — LEVOTHYROXINE SODIUM 25 UG/1
50 TABLET ORAL DAILY
Status: DISCONTINUED | OUTPATIENT
Start: 2023-07-03 | End: 2023-07-05 | Stop reason: HOSPADM

## 2023-07-02 RX ORDER — PRENATAL VIT/IRON FUM/FOLIC AC 27MG-0.8MG
1 TABLET ORAL EVERY EVENING
Status: DISCONTINUED | OUTPATIENT
Start: 2023-07-02 | End: 2023-07-05 | Stop reason: HOSPADM

## 2023-07-02 RX ORDER — ACETAMINOPHEN 325 MG/1
650 TABLET ORAL EVERY 4 HOURS PRN
Status: DISCONTINUED | OUTPATIENT
Start: 2023-07-02 | End: 2023-07-05 | Stop reason: HOSPADM

## 2023-07-02 RX ORDER — ONDANSETRON 2 MG/ML
4 INJECTION INTRAMUSCULAR; INTRAVENOUS EVERY 6 HOURS PRN
Status: DISCONTINUED | OUTPATIENT
Start: 2023-07-02 | End: 2023-07-05 | Stop reason: HOSPADM

## 2023-07-02 RX ORDER — AZATHIOPRINE 50 MG/1
150 TABLET ORAL DAILY
Status: DISCONTINUED | OUTPATIENT
Start: 2023-07-03 | End: 2023-07-02

## 2023-07-02 RX ORDER — ONDANSETRON 4 MG/1
4 TABLET, ORALLY DISINTEGRATING ORAL EVERY 6 HOURS PRN
Status: DISCONTINUED | OUTPATIENT
Start: 2023-07-02 | End: 2023-07-05 | Stop reason: HOSPADM

## 2023-07-02 RX ADMIN — SODIUM BICARBONATE 650 MG TABLET 650 MG: at 22:12

## 2023-07-02 RX ADMIN — MAGNESIUM OXIDE TAB 400 MG (241.3 MG ELEMENTAL MG) 800 MG: 400 (241.3 MG) TAB at 22:12

## 2023-07-02 RX ADMIN — AZATHIOPRINE 150 MG: 50 TABLET ORAL at 22:43

## 2023-07-02 RX ADMIN — ASPIRIN 81 MG CHEWABLE TABLET 81 MG: 81 TABLET CHEWABLE at 22:12

## 2023-07-02 RX ADMIN — PRENATAL VIT W/ FE FUMARATE-FA TAB 27-0.8 MG 1 TABLET: 27-0.8 TAB at 22:12

## 2023-07-02 RX ADMIN — CYANOCOBALAMIN TAB 1000 MCG 1000 MCG: 1000 TAB at 22:12

## 2023-07-02 RX ADMIN — CEFTRIAXONE 2 G: 2 INJECTION, POWDER, FOR SOLUTION INTRAMUSCULAR; INTRAVENOUS at 20:42

## 2023-07-02 RX ADMIN — SODIUM CHLORIDE, POTASSIUM CHLORIDE, SODIUM LACTATE AND CALCIUM CHLORIDE 1000 ML: 600; 310; 30; 20 INJECTION, SOLUTION INTRAVENOUS at 19:06

## 2023-07-02 RX ADMIN — ACETAMINOPHEN 650 MG: 325 TABLET, FILM COATED ORAL at 17:49

## 2023-07-02 RX ADMIN — TACROLIMUS 2 MG: 1 CAPSULE ORAL at 22:11

## 2023-07-02 RX ADMIN — TACROLIMUS 5 MG: 5 CAPSULE ORAL at 22:11

## 2023-07-02 ASSESSMENT — ACTIVITIES OF DAILY LIVING (ADL)
ADLS_ACUITY_SCORE: 31

## 2023-07-02 NOTE — PROVIDER NOTIFICATION
07/02/23 1614   Provider Notification   Provider Name/Title Dr. Pringle   Method of Notification Electronic Page   Request Evaluate in Person   Notification Reason Patient Arrived     Pt arrived 28.2 stated that she has had a fever since yesterday. Afebrile upon arrival. Stated her urine tested positive for e. coli and was told to come in.

## 2023-07-02 NOTE — PROVIDER NOTIFICATION
07/02/23 1838   Provider Notification   Provider Name/Title Dr. Trinh Lr   Method of Notification Electronic Page   Notification Reason Maternal Vital Sign Change     Pt temp 100.7, 1 hr after tylenol

## 2023-07-03 ENCOUNTER — DOCUMENTATION ONLY (OUTPATIENT)
Dept: CARE COORDINATION | Facility: CLINIC | Age: 33
End: 2023-07-03
Payer: MEDICARE

## 2023-07-03 LAB
ACINETOBACTER SPECIES: NOT DETECTED
ALBUMIN SERPL BCG-MCNC: 3.4 G/DL (ref 3.5–5.2)
ALP SERPL-CCNC: 105 U/L (ref 35–104)
ALT SERPL W P-5'-P-CCNC: 10 U/L (ref 0–50)
ANION GAP SERPL CALCULATED.3IONS-SCNC: 12 MMOL/L (ref 7–15)
AST SERPL W P-5'-P-CCNC: 14 U/L (ref 0–45)
BILIRUB SERPL-MCNC: 0.7 MG/DL
BUN SERPL-MCNC: 23.9 MG/DL (ref 6–20)
CALCIUM SERPL-MCNC: 9 MG/DL (ref 8.6–10)
CHLORIDE SERPL-SCNC: 106 MMOL/L (ref 98–107)
CITROBACTER SPECIES: NOT DETECTED
CREAT SERPL-MCNC: 1.37 MG/DL (ref 0.51–0.95)
CTX-M: NOT DETECTED
DEPRECATED HCO3 PLAS-SCNC: 18 MMOL/L (ref 22–29)
ENTEROBACTER SPECIES: NOT DETECTED
ERYTHROCYTE [DISTWIDTH] IN BLOOD BY AUTOMATED COUNT: 14.3 % (ref 10–15)
ESCHERICHIA COLI: DETECTED
GFR SERPL CREATININE-BSD FRML MDRD: 53 ML/MIN/1.73M2
GLUCOSE SERPL-MCNC: 108 MG/DL (ref 70–99)
HCT VFR BLD AUTO: 26.6 % (ref 35–47)
HGB BLD-MCNC: 8.4 G/DL (ref 11.7–15.7)
IMP: NOT DETECTED
KLEBSIELLA OXYTOCA: NOT DETECTED
KLEBSIELLA PNEUMONIAE: NOT DETECTED
KPC: NOT DETECTED
MCH RBC QN AUTO: 30.8 PG (ref 26.5–33)
MCHC RBC AUTO-ENTMCNC: 31.6 G/DL (ref 31.5–36.5)
MCV RBC AUTO: 97 FL (ref 78–100)
NDM: NOT DETECTED
OXA (DETECTED/NOT DETECTED): NOT DETECTED
PLATELET # BLD AUTO: 188 10E3/UL (ref 150–450)
POTASSIUM SERPL-SCNC: 5.1 MMOL/L (ref 3.4–5.3)
PROT SERPL-MCNC: 7.3 G/DL (ref 6.4–8.3)
PROTEUS SPECIES: NOT DETECTED
PSEUDOMONAS AERUGINOSA: NOT DETECTED
RBC # BLD AUTO: 2.73 10E6/UL (ref 3.8–5.2)
SODIUM SERPL-SCNC: 136 MMOL/L (ref 136–145)
TACROLIMUS BLD-MCNC: 4.6 UG/L (ref 5–15)
TME LAST DOSE: ABNORMAL H
TME LAST DOSE: ABNORMAL H
TSH SERPL DL<=0.005 MIU/L-ACNC: 0.83 UIU/ML (ref 0.3–4.2)
VIM: NOT DETECTED
WBC # BLD AUTO: 11.4 10E3/UL (ref 4–11)

## 2023-07-03 PROCEDURE — 250N000011 HC RX IP 250 OP 636: Mod: JZ | Performed by: STUDENT IN AN ORGANIZED HEALTH CARE EDUCATION/TRAINING PROGRAM

## 2023-07-03 PROCEDURE — 120N000002 HC R&B MED SURG/OB UMMC

## 2023-07-03 PROCEDURE — 36415 COLL VENOUS BLD VENIPUNCTURE: CPT | Performed by: STUDENT IN AN ORGANIZED HEALTH CARE EDUCATION/TRAINING PROGRAM

## 2023-07-03 PROCEDURE — 99232 SBSQ HOSP IP/OBS MODERATE 35: CPT | Mod: 25 | Performed by: OBSTETRICS & GYNECOLOGY

## 2023-07-03 PROCEDURE — 80053 COMPREHEN METABOLIC PANEL: CPT

## 2023-07-03 PROCEDURE — 87040 BLOOD CULTURE FOR BACTERIA: CPT | Performed by: STUDENT IN AN ORGANIZED HEALTH CARE EDUCATION/TRAINING PROGRAM

## 2023-07-03 PROCEDURE — 99223 1ST HOSP IP/OBS HIGH 75: CPT | Mod: 24 | Performed by: INTERNAL MEDICINE

## 2023-07-03 PROCEDURE — 250N000013 HC RX MED GY IP 250 OP 250 PS 637

## 2023-07-03 PROCEDURE — 80197 ASSAY OF TACROLIMUS: CPT

## 2023-07-03 PROCEDURE — 85027 COMPLETE CBC AUTOMATED: CPT

## 2023-07-03 PROCEDURE — 59025 FETAL NON-STRESS TEST: CPT | Mod: 26 | Performed by: OBSTETRICS & GYNECOLOGY

## 2023-07-03 PROCEDURE — 250N000012 HC RX MED GY IP 250 OP 636 PS 637

## 2023-07-03 PROCEDURE — 84443 ASSAY THYROID STIM HORMONE: CPT

## 2023-07-03 PROCEDURE — 36415 COLL VENOUS BLD VENIPUNCTURE: CPT

## 2023-07-03 RX ORDER — CEFTRIAXONE 2 G/1
2 INJECTION, POWDER, FOR SOLUTION INTRAMUSCULAR; INTRAVENOUS EVERY 24 HOURS
Status: DISCONTINUED | OUTPATIENT
Start: 2023-07-03 | End: 2023-07-05

## 2023-07-03 RX ADMIN — PRENATAL VIT W/ FE FUMARATE-FA TAB 27-0.8 MG 1 TABLET: 27-0.8 TAB at 20:24

## 2023-07-03 RX ADMIN — SODIUM BICARBONATE 650 MG TABLET 650 MG: at 08:27

## 2023-07-03 RX ADMIN — ASPIRIN 81 MG CHEWABLE TABLET 81 MG: 81 TABLET CHEWABLE at 22:17

## 2023-07-03 RX ADMIN — ACETAMINOPHEN 650 MG: 325 TABLET, FILM COATED ORAL at 14:36

## 2023-07-03 RX ADMIN — CEFTRIAXONE SODIUM 2 G: 2 INJECTION, POWDER, FOR SOLUTION INTRAMUSCULAR; INTRAVENOUS at 21:00

## 2023-07-03 RX ADMIN — MAGNESIUM OXIDE TAB 400 MG (241.3 MG ELEMENTAL MG) 800 MG: 400 (241.3 MG) TAB at 08:27

## 2023-07-03 RX ADMIN — ACETAMINOPHEN 650 MG: 325 TABLET, FILM COATED ORAL at 01:23

## 2023-07-03 RX ADMIN — ACETAMINOPHEN 650 MG: 325 TABLET, FILM COATED ORAL at 08:40

## 2023-07-03 RX ADMIN — TACROLIMUS 5 MG: 5 CAPSULE ORAL at 22:17

## 2023-07-03 RX ADMIN — MAGNESIUM OXIDE TAB 400 MG (241.3 MG ELEMENTAL MG) 800 MG: 400 (241.3 MG) TAB at 20:24

## 2023-07-03 RX ADMIN — TACROLIMUS 2 MG: 1 CAPSULE ORAL at 10:46

## 2023-07-03 RX ADMIN — ACETAMINOPHEN 650 MG: 325 TABLET, FILM COATED ORAL at 18:37

## 2023-07-03 RX ADMIN — AZATHIOPRINE 150 MG: 50 TABLET ORAL at 22:17

## 2023-07-03 RX ADMIN — CYANOCOBALAMIN TAB 1000 MCG 1000 MCG: 1000 TAB at 22:17

## 2023-07-03 RX ADMIN — TACROLIMUS 2 MG: 1 CAPSULE ORAL at 22:19

## 2023-07-03 RX ADMIN — LEVOTHYROXINE SODIUM 50 MCG: 25 TABLET ORAL at 08:27

## 2023-07-03 RX ADMIN — TACROLIMUS 5 MG: 5 CAPSULE ORAL at 10:45

## 2023-07-03 RX ADMIN — SODIUM BICARBONATE 650 MG TABLET 650 MG: at 20:24

## 2023-07-03 ASSESSMENT — ACTIVITIES OF DAILY LIVING (ADL)
ADLS_ACUITY_SCORE: 31
WEAR_GLASSES_OR_BLIND: YES
DRESSING/BATHING_DIFFICULTY: NO
ADLS_ACUITY_SCORE: 31
DOING_ERRANDS_INDEPENDENTLY_DIFFICULTY: NO
ADLS_ACUITY_SCORE: 31
ADLS_ACUITY_SCORE: 31
DIFFICULTY_EATING/SWALLOWING: NO
ADLS_ACUITY_SCORE: 31
ADLS_ACUITY_SCORE: 31
WALKING_OR_CLIMBING_STAIRS_DIFFICULTY: NO
ADLS_ACUITY_SCORE: 20
CONCENTRATING,_REMEMBERING_OR_MAKING_DECISIONS_DIFFICULTY: NO
ADLS_ACUITY_SCORE: 31
ADLS_ACUITY_SCORE: 31
TOILETING_ISSUES: NO
ADLS_ACUITY_SCORE: 31
FALL_HISTORY_WITHIN_LAST_SIX_MONTHS: NO
CHANGE_IN_FUNCTIONAL_STATUS_SINCE_ONSET_OF_CURRENT_ILLNESS/INJURY: NO
ADLS_ACUITY_SCORE: 31
ADLS_ACUITY_SCORE: 31
VISION_MANAGEMENT: GLASSES

## 2023-07-03 NOTE — PROGRESS NOTES
Follow-up with anemia management service:    Mona is admitted to [facility]. High Risk Pregnancy. Fever.  Aranesp was due today, Mona canceled her appt d/t admission.         Latest Ref Rng & Units 6/19/2023    10:28 AM 6/20/2023    11:00 AM 6/22/2023    10:07 AM 6/26/2023    10:31 AM 6/29/2023    10:28 AM 7/2/2023     5:04 PM 7/3/2023     7:54 AM   Anemia   CHARLINE Dose   40mcg        Hemoglobin 11.7 - 15.7 g/dL 9.5   9.1  9.9  9.4  8.7  8.4            Manjula Mckinnon RN   Anemia Services  [facility]  [email or web address]   Office : [phone]  Fax: [phone]

## 2023-07-03 NOTE — DISCHARGE SUMMARY
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 ()  Renal transplant  Secondary renal hyperparathyroidism  Obstructive Sleep Apnea  Hypothyroidism  Hx of DVT  Hx of bacterial endocarditis  Bicornuate uterus   Chronic vs gestational hypertension          Discharge Diagnosis:   -IUP at 28w5d   UTI caused by E.coli, given IV ceftriaxone   CAMERON, improving  Nephrostomy tube  Hx Post-obstructive ATN  Hx IgA nephropathy, 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 ()  Renal transplant  Secondary renal hyperparathyroidism  Obstructive Sleep Apnea  Hypothyroidism  Hx of DVT  Hx of bacterial endocarditis  Bicornuate uterus   Chronic vs gestational hypertension          Procedures:   Procedure(s): None             Medications Prior to Admission:     Medications Prior to Admission   Medication Sig Dispense Refill Last Dose     acetaminophen (TYLENOL) 325 MG tablet Take 2 tablets (650 mg) by mouth every 4 hours as needed for mild pain 100 tablet 3      aspirin (ASA) 81 MG chewable tablet Take 1 tablet (81 mg) by mouth daily 90 tablet 3      azaTHIOprine (IMURAN) 50 MG tablet Take 3 tablets (150 mg) by mouth daily 270 tablet 3      cyanocobalamin (VITAMIN B-12) 1000 MCG tablet Take 1 tablet (1,000 mcg) by mouth daily 90 tablet 1      famotidine (PEPCID) 20 MG tablet Take 1 tablet (20 mg) by mouth 2 times daily 180 tablet 3      levothyroxine (SYNTHROID/LEVOTHROID) 50 MCG tablet Take 1 tablet (50 mcg) by mouth daily 90 tablet 1      magnesium oxide (MAG-OX) 400 MG tablet Take 2 tablets (800 mg)  by mouth 2 times daily 90 tablet 1      polyethylene glycol (MIRALAX) 17 GM/Dose powder Take 17 g (1 capful) by mouth daily as needed for constipation 507 g 3      Prenatal Vit-Fe Fumarate-FA (PRENATAL MULTIVITAMIN W/IRON) 27-0.8 MG tablet Take 1 tablet by mouth every evening 90 tablet 3      simethicone (MYLICON) 125 MG chewable tablet Take 1 tablet (125 mg) by mouth 4 times daily as needed for intestinal gas 120 tablet 3      sodium bicarbonate 650 MG tablet Take 1 tablet (650 mg) by mouth 2 times daily 180 tablet 3      tacrolimus (GENERIC EQUIVALENT) 1 MG capsule Take 2 capsules (2 mg) by mouth 2 times daily Total dose = 7 mg twice daily 360 capsule 3      tacrolimus (GENERIC EQUIVALENT) 5 MG capsule Take 1 capsule (5 mg) by mouth 2 times daily Total dose = 7 mg every AM and 7 mg every PM 60 capsule 11           Discharge Medications:   Continue IV ceftriaxone at outpatient infusion as scheduled through   Start oral Keflex           Consultations:   Transplant Nephrology  Infectious Disease           Brief Admission History   Mona Wagner is a 32 year old  at 28w3d by LMP c/w 8w4d US who was seen  for evaluation of her PNT by urology nurse, routine UA and Ucx. At that time the patient had been feeling well, though noticed more sediment in her PNT bag. On the afternoon of  patient began feeling unwell- she noted feeling feverish w/ chills and took her temperature with a Tmax of 100. She took Tylenol overnight with some improvement in symptoms. The patient also reported mild discomfort around her PNT site which she states is not atypical for her but slightly increased from usual.     On the evening of  the patient arrived at the hospital stating her urine test was positive for E.coli and she was told to come in.     She denies any abdominal pain, back pain, contractions, vaginal bleeding, loss of fluid, nausea, vomiting. Patient notes she is feeling baby move as usual.           Hospital Course:    Patient received Tylenol and her temp was 100.7 on the evening of 7/2. Blood and urine culture positive for e. Coli with WBC 11.5. She was started on IV ceftriaxone per ID recommendation and remained afebrile for the remainder of her hospitalization. Transplant nephrology was consulted while she was inpatient and followed along. Overnight 7/4, the patient urinated through her urethra, US renal completed and demonstrated nephrostomy tubes in place.     From a hypothyroidism standpoint, she was due for repeat TSH/T4 while in house, TSH 0.83, within normal range.     She was continued on her PTA medications. She had no obstetric complaints while in house. On the day of discharge, she was tolerating PO without issue, ambulating without dizziness. She was discharged home with outpatient infusion appointments per ID's recommendation of 7 days IV ceftriaxone (last infusion 7/8)as no oral options were available. Patient will start oral Keflex for secondary prophylaxis of pyelonephritis on 7/9 after IV Ceftriaxone course has been completed.  She will have these IV antibiotics in the outpatient infusion therapy center.          Discharge Instructions and Follow-Up:   Discharge diet: Regular   Discharge activity: Return to regular activities as tolerated    Discharge follow-up: Follow up with MFM on 7/13/23  Daily IV ceftriaxone through [facility] infusion therapy center.           Discharge Disposition:   Discharged to home      Angeles James, MPH  MS4 [facility]    Physician Attestation   I saw and evaluated this patient prior to discharge.  I discussed the patient with the resident/fellow and agree with plan of care as documented in the note.      I personally reviewed vital signs, medications, labs, imaging and EFM.    I personally spent 50 minutes on discharge activities.    Tiffany Montiel MD  Date of Service (when I saw the patient): 07/05/23

## 2023-07-03 NOTE — PROGRESS NOTES
[facility] Antepartum Progress Note    Subjective: Patient reports rigors, myalgias that resolve with Tylenol. No vaginal bleeding, LOF, headaches, vision changes, abdominal pain, chest pain, or any other symptoms.     Objective:  Vitals:    07/02/23 2349 07/03/23 0337 07/03/23 0700 07/03/23 0800   BP: 105/52 97/52 106/61    BP Location: Right arm Right arm     Patient Position: Semi-Carrasco's Semi-Carrasco's     Cuff Size: Adult Regular Adult Regular     Pulse:       Resp: 16 16  16   Temp: 99.5  F (37.5  C) 100  F (37.8  C)  98.3  F (36.8  C)   TempSrc: Oral Oral  Oral       I/O last 3 completed shifts:  In: 775 [P.O.:775]  Out: 1950 [Urine:1950]    Gen: Resting comfortably in bed, NAD  CV: Well-perfused   Resp: Breathing non-labored   Abd: Gravid, non-tender, non-distended  Ext: non-tender, no edema    FHT: , moderate variability, + accels, no decels  [place]: intermittent     Component Ref Range & Units  7:54 AM 2 wk ago 3 wk ago 1 mo ago 2 mo ago 3 mo ago 4 mo ago    TSH 0.30 - 4.20 uIU/mL 0.83  2.51  2.28  1.77  2.00  0.77  0.37        Component  7:54 AM  (7/3/23)   Sodium 136    Potassium 5.1    Chloride 106    Carbon Dioxide (CO2) 18 Low     Anion Gap 12    Urea Nitrogen 23.9 High     Creatinine 1.37 High     Calcium 9.0    Glucose 108 High     Alkaline Phosphatase 105 High     AST 14    ALT 10    Protein Total 7.3    Albumin 3.4 Low     Bilirubin Total 0.7    GFR Estimate 53 Low       Protein Total 7.3    Albumin 3.4 Low     Bilirubin Total 0.7    GFR Estimate 53 Low       Tacrolimus level: In process    Component  7:54 AM  (7/3/23) 1 d ago  (7/2/23) 4 d ago  (6/29/23)   WBC Count 11.4 High   11.5 High   9.6    RBC Count 2.73 Low   2.77 Low   3.02 Low     Hemoglobin 8.4 Low   8.7 Low   9.4 Low     Hematocrit 26.6 Low   27.1 Low   29.6 Low     MCV 97  98  98    MCH 30.8  31.4  31.1    MCHC 31.6  32.1  31.8    RDW 14.3  14.1  14.3    Platelet Count 188  176  228      Blood Culture: Positive for gram negative rods      Assessment: Mona Wagner is a 32 year old  @ 28w3d by LMP here for fevers and e.coli UTI in the setting of renal transplant w/ PNT in place. Patient presenting with one day of subjective fevers at home and outpatient Ucx from PNT w/ e. Coli UTI. Patient febrile to 100.7 w/ WBC of 11.5 and creatinine elevated from baseline at 1.47. Patient otherwise asymptomatic with normal vital signs. Patient admitted for IV antibiotics and blood cultures today grew e.coli, treating with IV ceftriaxone per ID recommendations.      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 Coli UTI, c/f pyelonephritis, r/o urosepsis   Fevers  - Follows with Dr. Mcintyre in transplant nephrology; ID consult: reccommended 1) repeat blood culture today and tomorrow 2) 2g IV ceftriaxone daily 7-10 days, noted that there were not oral options. Followed up with pharmacy to determine if additional sensitivities can be added to determine an oral antibiotic option, awaiting recommendations.   - Continue with Tacrolimus (goal 4-6) and Azathioprine as prescribed by nephrology. Tacro level last , w/in goal; 7/3AM Tacro level ord'd, now pending   - Nephrostomy tube in place since . Good urine output.   - Urine culture  significant for e.coli; blood culture positive gram negative rods, ID consulted per above   - Creatinine trend 1.31 on  -> 1.47 yesterday -> 1.37 today, s/p 1L LR  - Tmax/last 100.7/2 on admission, now afebrile on scheduled tylenol, but with rigors and myalgias   - WBC 11.5. lactate 1.0, repeat pending.   - D#2 Ceftriaxone      Hypothyroidism:  Secondary Hyperparathyroidism with hypercalcemia   - Resection of parathyroid glands on .   - Follows with endo. Continue current levothyroxine prescription 50 mcg daily.  - 23 TSH 2.51; today 0.83, within the range of recent TSH measurements for patient in the last 4 months  - Endocrinology next visit      Hx DVT:  - Assessment for inherited  thrombophilias including Factor V Leiden and Prothrombin Gene Mutation:  Negative.  - Additional hypercoagulability work up negative.  - Will consider DVT prophylaxis if admitted for prolonged period of time      Hx of bacterial endocarditis:  - Echo on 3/20:  Interpretation Summary  Global and regional left ventricular function is hyperkinetic with an EF >70%.  Right ventricular function, chamber size, wall motion, and thickness are  normal.  Pulmonary artery systolic pressure is normal.  The inferior vena cava cannot be assessed.  No pericardial effusion is present.     FATOUMATA  - Continue PTA CPAP      Routine PNC:  - Prenatal labs:               Rh: +  antibody: neg               HepB/HIV/RPR/HepC: nonreactive               GC/CT: neg               Rubella: non-immune  Varicella: non-immune               GCT: passed early GCT. Will repeat today               UC: no growth               Pap: NIL and HPV neg  - Immunizations: s/p Flu and Covid. Due for Tdap. Will offer during admission   - Genetic screening: NIPT low-risk. Low-risk NT .      FGR AC 9% with normal UAR:   - Serial growth US q3 weeks, next    - Weekly NST with UAR, next     Angeles James, MPH  MS4 [facility]  11:53 AM 7/3/2023    Physician Attestation   I, Tiffany Montiel MD, was present with the medical/MAR student who participated in the service and in the documentation of the note.  I have verified the history and personally performed the physical exam and medical decision making.  I agree with the assessment and plan of care as documented in the note.      Key findings: In summary, Mona Wagner is a  at 28w3d admitted with pyelonephritis in context of renal transplant history and nephrostomy tube placement due to obstructive uropathy.  Clinically, Mona is improving and will await ID recommendations due to positive blood cultures.  Continue with IV antibiotics at this time.      40 MINUTES SPENT BY ME on the date of service  doing chart review, history, exam, documentation & further activities per the note.    I have personally reviewed the following data over the past 24 hrs:    11.4 (H)  \   8.4 (L)   / 188     136 106 23.9 (H) /  108 (H)   5.1 18 (L) 1.37 (H) \       ALT: 10 AST: 14 AP: 105 (H) TBILI: 0.7   ALB: 3.4 (L) TOT PROTEIN: 7.3 LIPASE: N/A       TSH: 0.83 T4: N/A A1C: N/A       Procal: N/A CRP: N/A Lactic Acid: 1.0           Tiffany Montiel MD  Date of Service (when I saw the patient): 07/03/23

## 2023-07-03 NOTE — PLAN OF CARE
VSS, patient reports chills, with mild fever, BP stable. Tylenol given x1, see MAR. Denies bleeding, leaking of fluid, SOB, or RUQ pain. See flowsheet for detail EFM charting. Pt report active fetal movement. Continue uterine/fetal assessment twice daily. Activity level:Regular activity, and preventive measures including Medications, Positioning and Frequent voiding. Encourage active range of motion and frequent position changes.  Plan: Continue expectant management. Observe for and notify care provider of indications of progressing labor or signs of fetal/maternal compromise.

## 2023-07-03 NOTE — PLAN OF CARE
Goal Outcome Evaluation:       Patient has been stable. Eating and drinking. Clear urine in the nephrostomy bag. Reports chills and feverish which resolves with Tylenol. reports feeling better than yesterday. Patient is receiving IV antibiotics for bacteremia Q 24 hours. Continue with plan of care.

## 2023-07-03 NOTE — H&P
OB HISTORY AND PHYSICAL    Patient: Mona Wagner   MRN#: [id number]  YOB: 1990     HPI: Mona Wagner is a 32 year old  at 28w2d by LMP c/w 8w4d US who presents today for fevers and e.coli UTI.    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  This course was complicated by subsequent electrolyte abnormalities due to post obstructive diuresis which has since resolved following hospitalization (see hospitalization summary and most clinic note  for more details). The patient follows closely with her transplant team.    The patient was seen  for evaluation of her PNT by urology nurse, routine UA and Ucx. At that time the patient had been feeling well, thought noticed more sediment in her PNT bag. Yesterday afternoon, the patient began feeling unwell- she noted feeling feverish w/ chills and took her temperature with a Tmax of 100. She reports use of Tylenol overnight with some improvement in symptoms. The patient also reported mild discomfort around her PNT site which she states is not atypical for her but slightly increased from usual. She denies any abdominal pain, back pain, contractions, vaginal bleeding, loss of fluid, nausea, vomiting. Patient notes she is feeling baby move as usual.     Prenatal Lab  Results:  Lab Results   Component Value Date    ABO O 2019    HCT 27.1 2023    HCT 39.1 2021    HGB 8.7 2023    HGB 12.2 2021     Patient Active Problem List    Diagnosis Date Noted     Encounter for triage in pregnant patient 2023     Priority: Medium      contractions 2023     Priority: Medium     Elevated blood pressure affecting pregnancy in second trimester, antepartum 2023     Priority: Medium     Stage 3a chronic kidney disease (H) 2023     Priority: Medium     Anemia of chronic renal failure 2023     Priority: Medium     Dehydration 2023     Priority: Medium     High-risk pregnancy in second trimester 2023     Priority: Medium     Orthostatic hypotension 2022     Priority: Medium     Tertiary hyperparathyroidism (H) 2022     Priority: Medium     Ureteral stenosis 2020     Priority: Medium     Added automatically from request for surgery [id number]       Bicornate uterus 2020     Priority: Medium     CKD (chronic kidney disease) stage 3, GFR 30-59 ml/min (H) 2019     Priority: Medium     Vitamin D deficiency 2019     Priority: Medium     Kidney replaced by transplant 2019     Priority: Medium     Anxiety 2019     Priority: Medium     Immunosuppressed status (H) 2019     Priority: Medium     Aftercare following organ transplant 2019     Priority: Medium     Acquired hypothyroidism 12/15/2015     Priority: Medium     Seizure disorder (H) 2014     Priority: Medium     DVT of upper extremity (deep vein thrombosis) (H) 2014     Priority: Medium     Took five of six months of warfarin therapy. Had SAH on therapy and thus decision made to discontinue.     Formatting of this note might be different from the original.  DVT from dialysis cathter. US negative 2014       Increased prolactin level 2014     Priority: Medium       HISTORY  Past Medical History:    Diagnosis Date     Anemia in chronic kidney disease      History of bacterial endocarditis      History of blood transfusion      History of DVT (deep vein thrombosis) 2014     History of seizure 2014     History of subarachnoid hemorrhage      Hydronephrosis      Hypothyroidism      Kidney transplanted 08/03/2019    DCD DDKT. Induction with thymo 6mg/kg.     Orthostatic hypotension      FATOUMATA (obstructive sleep apnea)      Pyelonephritis of transplanted kidney 01/23/2020     Secondary renal hyperparathyroidism (H)      Urinary tract infection        Past Surgical History:   Procedure Laterality Date     BENCH KIDNEY N/A 8/3/2019    Procedure: BACKBENCH PREPARATION, ALLOGRAFT, KIDNEY;  Surgeon: Dorian Johnson MD;  Location: [facility]     COMBINED CYSTOSCOPY, RETROGRADES, EXCHANGE STENT URETER(S) Right 11/23/2020    Procedure: CYSTOSCOPY, CYSOTGRAM, WITH RETROGRADE PYELOGRAM, ureteroscopy,  AND URETERAL STENT;  Surgeon: Wally Britt MD;  Location: [facility]     COMBINED CYSTOSCOPY, RETROGRADES, URETEROSCOPY, LASER HOLMIUM LITHOTRIPSY URETER(S), INSERT STENT N/A 12/6/2019    Procedure: CYSTOURETEROSCOPY, WITH RETROGRADE PYELOGRAM of transplant kidney, STENT INSERTION, Laser on standby;  Surgeon: Wally Britt MD;  Location: [facility]     CREATE FISTULA ARTERIOVENOUS UPPER EXTREMITY  1/2/2014    Procedure: CREATE FISTULA ARTERIOVENOUS UPPER EXTREMITY;  Left Wrist Arteriovenous Fistula Placement;  Surgeon: Shashi Castro MD;  Location: [facility] OR     CREATE FISTULA ARTERIOVENOUS UPPER EXTREMITY       CYSTOSCOPY, RETROGRADES, INSERT STENT URETER(S), COMBINED N/A 8/20/2020    Procedure: CYSTOSCOPY, WITH RETROGRADE PYELOGRAM, CYSTOGRAM AND URETERAL STENT INSERTION - TRANSPLANT KIDNEY;  Surgeon: Wally Britt MD;  Location: [facility]     IR CEREBRAL ANGIOGRAM  9/17/2014     IR NEPHROSTOMY TUBE PLACEMENT RIGHT  6/9/2023     PARATHYROIDECTOMY Bilateral 4/28/2023    Procedure: Bilateral Resection of 3 and 1/2  parathyroid glands;  Surgeon: Melissa Jones MD;  Location: [facility]     PERCUTANEOUS BIOPSY KIDNEY Right 2019    Procedure: Right Kidney Biopsy;  Surgeon: Deny Rios MD;  Location: [facility]     RASTA/DIALYSIS CATHETER  12/10/2013          TRANSPLANT KIDNEY RECIPIENT  DONOR N/A 8/3/2019    Procedure: TRANSPLANT, KIDNEY, RECIPIENT,  DONOR with Ureteral Stent Placement;  Surgeon: Dorian Johnson MD;  Location: [facility]       Family History   Problem Relation Age of Onset     Kidney Disease Mother      Kidney failure Mother      Coronary Artery Disease Father      Cerebrovascular Disease Father      Heart Disease Father      Sleep Apnea Father      Hypertension Sister      Diabetes Sister      Cerebrovascular Disease Sister      Kidney Disease Sister      Breast Cancer No family hx of      Cancer - colorectal No family hx of      Ovarian Cancer No family hx of      Prostate Cancer No family hx of      Other Cancer No family hx of      Asthma No family hx of      Anesthesia Reaction No family hx of      Deep Vein Thrombosis (DVT) No family hx of      Melanoma No family hx of      Skin Cancer No family hx of      Thrombosis No family hx of        Social History     Tobacco Use     Smoking status: Never     Smokeless tobacco: Never   Vaping Use     Vaping Use: Never used   Substance Use Topics     Alcohol use: No     Alcohol/week: 0.0 standard drinks of alcohol     Drug use: No       Medications Prior to Admission   Medication Sig Dispense Refill Last Dose     acetaminophen (TYLENOL) 325 MG tablet Take 2 tablets (650 mg) by mouth every 4 hours as needed for mild pain 100 tablet 3      aspirin (ASA) 81 MG chewable tablet Take 1 tablet (81 mg) by mouth daily 90 tablet 3      azaTHIOprine (IMURAN) 50 MG tablet Take 3 tablets (150 mg) by mouth daily 270 tablet 3      cyanocobalamin (VITAMIN B-12) 1000 MCG tablet Take 1 tablet (1,000 mcg) by mouth daily 90 tablet 1      famotidine (PEPCID) 20  MG tablet Take 1 tablet (20 mg) by mouth 2 times daily 180 tablet 3      levothyroxine (SYNTHROID/LEVOTHROID) 50 MCG tablet Take 1 tablet (50 mcg) by mouth daily 90 tablet 1      magnesium oxide (MAG-OX) 400 MG tablet Take 2 tablets (800 mg) by mouth 2 times daily 90 tablet 1      polyethylene glycol (MIRALAX) 17 GM/Dose powder Take 17 g (1 capful) by mouth daily as needed for constipation 507 g 3      Prenatal Vit-Fe Fumarate-FA (PRENATAL MULTIVITAMIN W/IRON) 27-0.8 MG tablet Take 1 tablet by mouth every evening 90 tablet 3      simethicone (MYLICON) 125 MG chewable tablet Take 1 tablet (125 mg) by mouth 4 times daily as needed for intestinal gas 120 tablet 3      sodium bicarbonate 650 MG tablet Take 1 tablet (650 mg) by mouth 2 times daily 180 tablet 3      tacrolimus (GENERIC EQUIVALENT) 1 MG capsule Take 2 capsules (2 mg) by mouth 2 times daily Total dose = 7 mg twice daily 360 capsule 3      tacrolimus (GENERIC EQUIVALENT) 5 MG capsule Take 1 capsule (5 mg) by mouth 2 times daily Total dose = 7 mg every AM and 7 mg every PM 60 capsule 11        Allergies   Allergen Reactions     Contrast Dye Rash     CT contrast allergy 12/14/19 rash over eyes. Need to have pre medication before a CT WITH CONTRAST      Diatrizoate Rash     CT contrast allergy 12/14/19 rash over eyes. Need to have pre medication before a CT WITH CONTRAST      Lisinopril Swelling     angioedema     Nitrous Oxide Other (See Comments)     Sense of doom     Chlorhexidine Rash     Rash at site     Sulfa Antibiotics Rash     Muscle stiffness of neck     Dapsone      Methemoglobinemia     Furosemide Other (See Comments)     Skin flushing     Metrogel [Metronidazole]      Hives diffusely on body after vaginal administration.     Azithromycin Dizziness and Rash     Cefuroxime Rash        REVIEW OF SYSTEMS:  A 10 point review of systems was completed and was negative other than as noted in the HPI.    PHYSICAL EXAM  Patient Vitals for the past 24 hrs:    BP Temp Temp src Pulse Resp   23 1930 -- 99.5  F (37.5  C) Oral -- --   23 1834 110/65 (!) 100.7  F (38.2  C) Oral -- 18   23 1730 -- 99.9  F (37.7  C) Oral -- --   23 1650 -- 98.9  F (37.2  C) Oral -- --   23 1605 111/58 98.7  F (37.1  C) Oral 105 18     Gen: NAD  CV: RRR, nl S1/S2, no murmurs/clicks/gallops  Lungs: CTAB, non-labored breathing  Abd: Gravid, non-distended; PNT tubing from R lower quadrant w/ mild tenderness at insertion site. Covered w/ clean bandage. No surrounding erythema.   Ext: 1+ peripheral extremity edema    SSE: not assessed     FHT:  Monitoring External  FHT: Baseline 150 bpm; moderate variability; + accels present; occ variable decelerations  TOCO 0 contractions in 10 minutes    Studies:    Latest Reference Range & Units 23 10:28 23 17:04   Sodium 136 - 145 mmol/L 135 (L) 132 (L)   Potassium 3.4 - 5.3 mmol/L 5.2 4.7   Chloride 98 - 107 mmol/L 105 103   Carbon Dioxide (CO2) 22 - 29 mmol/L 17 (L) 18 (L)   Urea Nitrogen 6.0 - 20.0 mg/dL 32.4 (H) 27.9 (H)   Creatinine 0.51 - 0.95 mg/dL 1.31 (H) 1.47 (H)   GFR Estimate >60 mL/min/1.73m2 56 (L) 49 (L)      Latest Reference Range & Units 23 10:28 23 17:04   WBC 4.0 - 11.0 10e3/uL 9.6 11.5 (H)   Hemoglobin 11.7 - 15.7 g/dL 9.4 (L) 8.7 (L)   Hematocrit 35.0 - 47.0 % 29.6 (L) 27.1 (L)   Platelet Count 150 - 450 10e3/uL 228 176   RBC Count 3.80 - 5.20 10e6/uL 3.02 (L) 2.77 (L)   MCV 78 - 100 fL 98 98   MCH 26.5 - 33.0 pg 31.1 31.4   MCHC 31.5 - 36.5 g/dL 31.8 32.1   RDW 10.0 - 15.0 % 14.3 14.1     Assessment & Plan: 32 year old  at 28w2d by LMP here for fevers and e.coli UTI in the setting of renal transplant w/ PNT in place. Patient presenting with one day of subjective fevers at home and outpatient Ucx from PNT w/ e. Coli UTI. Patient febrile to 100.7 w/ WBC of 11.5 and creatinine elevated from baseline at 1.47. Patient otherwise asymptomatic with normal vital signs. Patient admitted for IV  antibiotics and further monitoring.     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 Coli UTI, c/f pyelonephritis, r/o urosepsis   Fevers  - Follows with Dr. Mcintyre in transplant nephrology; Transplant consulted on admission, appreciate recommendations regarding management.   - Continue with Tacrolimus (goal 4-6) and Azathioprine as prescribed by nephrology. Tacro level last 6/29, w/in goal; AM Tacro level ord'd.   - Nephrostomy tube in place since 6/9. Good urine output.   - Urine culture 6/30 significant for e.coli; repeat UA/Ucx pending today   - Creatinine elevated today at 1.47 (last 1.31 on 6/30); IVF bolus ord'd; repeat CMP ordered for 7/3   - Tmax/last 100.7 (7/2 1834)  - WBC 11.5. lactate pending; repeat ordered for 7/3  - D#1 Ceftriaxone      Hypothyroidism:  Secondary Hyperparathyroidism with hypercalcemia   - Resection of parathyroid glands on 4/28.   - Follows with endo. Continue current levothyroxine prescription 50 mcg daily.  - 6/16/23 TSH 2.51; repeat due 7/3; ordered today   - Endocrinology next visit 7/28     Hx DVT:  - Assessment for inherited thrombophilias including Factor V Leiden and Prothrombin Gene Mutation:  Negative.  - Additional hypercoagulability work up negative.  - Will consider DVT prophylaxis if admitted for prolonged period of time      Hx of bacterial endocarditis:  - Echo on 3/20:  Interpretation Summary  Global and regional left ventricular function is hyperkinetic with an EF >70%.  Right ventricular function, chamber size, wall motion, and thickness are  normal.  Pulmonary artery systolic pressure is normal.  The inferior vena cava cannot be assessed.  No pericardial effusion is present.    FATOUMATA  - Continue PTA CPAP      Routine PNC:  - Prenatal labs:               Rh: +  antibody: neg               HepB/HIV/RPR/HepC: nonreactive               GC/CT: neg               Rubella: non-immune  Varicella: non-immune               GCT: passed early GCT. Will  repeat today               UC: no growth               Pap: NIL and HPV neg  - Immunizations: s/p Flu and Covid. Due for Tdap. Will offer during admission   - Genetic screening: NIPT low-risk. Low-risk NT .      FGR AC 9% with normal UAR:   - Serial growth US q3 weeks, next 7/19   - Weekly NST with UAR, next 7/5    Melani Lr MD MPH  OB/Gyn Resident PGY-3  7/2/2023  8:30 PM

## 2023-07-03 NOTE — CARE PLAN
Data: Mona Wagner transferred to PSCU via wheelchair at 2040. Action: Receiving unit notified of transfer: Yes. Patient and family notified of room change. Report given to Angeles WADSWORTH at 2045. Belongings sent to receiving unit. Accompanied by Registered Nurse. Oriented patient to surroundings. Call light within reach. Response: Patient tolerated transfer and is stable.

## 2023-07-03 NOTE — CONSULTS
General ID Service: Follow Up Note      Patient:  Mona Wagner, Date of birth 1990, Medical record number [id number]  Date of Visit:  July 3, 2023         Assessment and Recommendations:   ID Problem List:     28w pregnant  Hx of renal transplant c/b hydronephrosis 2/2 ureteral stenosis with nephrostomy tube  Fever and UTI dx at clinic on   -culture growing E coli  Blood cultures with E coli 1/2  Mike with Cr 1.47 on admission, improving with fluids    Recommendations:    -Repeat blood cultures today and tomorrow. Can stop if they remain negative  -agree with ceftriaxone 2 gm daily for bacteremia  -question to OB/Gyn should we do secondary prophylaxis for pyelonephritis?     Discussion:    Patient who is 28 weeks pregnant with a hx of a renal transplant who presents with E coli UTI and bacteremia. The E coli in her urine has sensitivities which show ceftriaxone is sensitive. Therefore she is on appropriate therapy.     We generally treat GNR bacteremia for 7-10 day however it can be oral after 48 hours if patient is stable and well. This may be a challenge given her bactrim allergy and pregnancy. I discussed with pharmacy and we don't see an oral that would work. Flouroquinolones are not recommended in pregnancy and the augmentin is I for her E coli.  She will likely need a picc and continue IV ceftriaxone.     A question for her MFM team is if she need secondary prophylaxis with an oral cephalosporin. It is probably reasonable in this high risk patient. An oral cephalosporin is reasonable for prophylaxis but not treatment.      We will continue to follow along. Don't hesitate to call with questions.     Attestation:  I have reviewed today's vital signs, medications, labs and imaging.  Delicia Malcolm MD  Pager [phone]          Interval History:       Ms. Wagner is a 31 yo  at 28w2d with a history of a renal transplant complicated by allograft hydronephrosis due to ureteral stenosis with a  nephrostomy tube in place presenting with a fever and urinary tract infection based on urine culture performed in clinic 6/30. Her culture is growing >100k E coli. She presented with CAMERON with Cr of 1.47, fevers and chills.     She denies pain. Her fevers are relieved by tylenol but come back in between. Nausea is improved.          Review of Systems:   Full 9 pt ROS obtained, pertinent positives and negatives as above.          Current Antimicrobials   Current:    Ceftriaxone 2 grams q24h         Physical Exam:   Ranges for vital signs:  Temp:  [98.3  F (36.8  C)-100.7  F (38.2  C)] 98.6  F (37  C)  Pulse:  [105] 105  Resp:  [16-18] 16  BP: ()/(52-67) 122/67    Intake/Output Summary (Last 24 hours) at 7/3/2023 1358  Last data filed at 7/3/2023 1352  Gross per 24 hour   Intake 1525 ml   Output 2825 ml   Net -1300 ml     Exam:  GENERAL:  well-developed, well-nourished, sitting in bed in no acute distress.   ENT:  Head is normocephalic, atraumatic. Oropharynx is moist without exudates or ulcers.  EYES:  Eyes have anicteric sclerae.    NECK:  Supple.  LUNGS:  Clear to auscultation.  CARDIOVASCULAR:  Regular rate and rhythm with no murmurs, gallops or rubs.  ABDOMEN:  Normal bowel sounds, soft, nontender. Pregnant. Has nephrostomy tube in place.   EXT: Extremities warm and without edema.  SKIN:  No acute rashes.  Line is in place without any surrounding erythema.  NEUROLOGIC:  Grossly nonfocal.         Laboratory Data:   Reviewed.  Pertinent for:    Cr 1.37  WBC 11.4    Culture data:    7/2/23    Blood cultures with E coli    6/30/23    Culture >100,000 CFU/mL Escherichia coli Abnormal             Resulting Agency: [facility]     Susceptibility     Escherichia coli     LINDA     Ampicillin >=32 ug/mL Resistant     Ampicillin/ Sulbactam 16 ug/mL Intermediate     Cefazolin <=4 ug/mL Susceptible 1     Cefepime <=1 ug/mL Susceptible     Cefoxitin <=4 ug/mL Susceptible     Ceftazidime <=1 ug/mL Susceptible     Ceftriaxone <=1  ug/mL Susceptible     Ciprofloxacin >=4 ug/mL Resistant     Gentamicin <=1 ug/mL Susceptible     Levofloxacin >=8 ug/mL Resistant     Nitrofurantoin <=16 ug/mL Susceptible     Piperacillin/Tazobactam <=4 ug/mL Susceptible     Tobramycin <=1 ug/mL Susceptible     Trimethoprim/Sulfamethoxazole <=1/19 ug/mL Susceptible                        Imaging:    reviewed

## 2023-07-03 NOTE — PROGRESS NOTES
[facility]: [facility]   Reason for maternal/fetal assessment per patient is fever and + urine culture for e. coli.  Patient is a . Prenatal record reviewed.      OB History    Para Term  AB Living   1 0 0 0 0 0   SAB IAB Ectopic Multiple Live Births   0 0 0 0 0      # Outcome Date GA Lbr Robinson/2nd Weight Sex Delivery Anes PTL Lv   1 Current            . Medical history:   Past Medical History:   Diagnosis Date     Anemia in chronic kidney disease      History of bacterial endocarditis      History of blood transfusion      History of DVT (deep vein thrombosis)      History of seizure      History of subarachnoid hemorrhage      Hydronephrosis      Hypothyroidism      Kidney transplanted 2019    DCD DDKT. Induction with thymo 6mg/kg.     Orthostatic hypotension      FATOUMATA (obstructive sleep apnea)      Pyelonephritis of transplanted kidney 2020     Secondary renal hyperparathyroidism (H)      Urinary tract infection    . Gestational Age 28w2d. VSS. Pt fever 100.7, provider aware. Tylenol administered. Fetal movement present. Patient denies cramping, backache, vaginal discharge, pelvic pressure, UTI symptoms, GI problems, bloody show, vaginal bleeding, edema, headache, visual disturbances, epigastric or RUQ pain, abdominal pain, rupture of membranes. Support person not present.   Action: Verbal consent for EFM. Triage assessment completed. EFM applied, see flowsheet. Per provider pt can come off of continuous monitors. Fetal assessment: Presumed adequate fetal oxygenation documented (see flow record).   Response: Dr. Pringle informed of arrival. Plan per provider is IV antibiotics, blood cultures, UA and CBC, CMP and lactic acid. Patient verbalized agreement with plan. Patient oriented to room and call light. Report given to ERMELINDA Forrester.

## 2023-07-03 NOTE — PLAN OF CARE
Pt transferred from labor via wheelchair at 2040. IV antibiotics started, pt tolerated well. Ate small amount of food. Some discomfort around neph tube site, o/w comfortable. Pt has belongings with her and family is aware that she is here.

## 2023-07-04 LAB
ALBUMIN SERPL BCG-MCNC: 3.3 G/DL (ref 3.5–5.2)
ALP SERPL-CCNC: 96 U/L (ref 35–104)
ALT SERPL W P-5'-P-CCNC: 9 U/L (ref 0–50)
ANION GAP SERPL CALCULATED.3IONS-SCNC: 14 MMOL/L (ref 7–15)
AST SERPL W P-5'-P-CCNC: 13 U/L (ref 0–45)
BACTERIA UR CULT: ABNORMAL
BILIRUB SERPL-MCNC: 0.5 MG/DL
BUN SERPL-MCNC: 22.2 MG/DL (ref 6–20)
CALCIUM SERPL-MCNC: 9 MG/DL (ref 8.6–10)
CHLORIDE SERPL-SCNC: 104 MMOL/L (ref 98–107)
CREAT SERPL-MCNC: 1.28 MG/DL (ref 0.51–0.95)
DEPRECATED HCO3 PLAS-SCNC: 16 MMOL/L (ref 22–29)
ERYTHROCYTE [DISTWIDTH] IN BLOOD BY AUTOMATED COUNT: 14.2 % (ref 10–15)
GFR SERPL CREATININE-BSD FRML MDRD: 58 ML/MIN/1.73M2
GLUCOSE SERPL-MCNC: 83 MG/DL (ref 70–99)
HCT VFR BLD AUTO: 27 % (ref 35–47)
HGB BLD-MCNC: 8.5 G/DL (ref 11.7–15.7)
MCH RBC QN AUTO: 31 PG (ref 26.5–33)
MCHC RBC AUTO-ENTMCNC: 31.5 G/DL (ref 31.5–36.5)
MCV RBC AUTO: 99 FL (ref 78–100)
PLATELET # BLD AUTO: 199 10E3/UL (ref 150–450)
POTASSIUM SERPL-SCNC: 5 MMOL/L (ref 3.4–5.3)
PROT SERPL-MCNC: 6.8 G/DL (ref 6.4–8.3)
RBC # BLD AUTO: 2.74 10E6/UL (ref 3.8–5.2)
SODIUM SERPL-SCNC: 134 MMOL/L (ref 136–145)
WBC # BLD AUTO: 11 10E3/UL (ref 4–11)

## 2023-07-04 PROCEDURE — 250N000011 HC RX IP 250 OP 636: Mod: JZ | Performed by: STUDENT IN AN ORGANIZED HEALTH CARE EDUCATION/TRAINING PROGRAM

## 2023-07-04 PROCEDURE — 250N000011 HC RX IP 250 OP 636: Mod: JZ

## 2023-07-04 PROCEDURE — 250N000013 HC RX MED GY IP 250 OP 250 PS 637

## 2023-07-04 PROCEDURE — 99232 SBSQ HOSP IP/OBS MODERATE 35: CPT | Mod: 25 | Performed by: OBSTETRICS & GYNECOLOGY

## 2023-07-04 PROCEDURE — 36415 COLL VENOUS BLD VENIPUNCTURE: CPT

## 2023-07-04 PROCEDURE — 87149 DNA/RNA DIRECT PROBE: CPT

## 2023-07-04 PROCEDURE — 59025 FETAL NON-STRESS TEST: CPT | Mod: 26 | Performed by: OBSTETRICS & GYNECOLOGY

## 2023-07-04 PROCEDURE — 99233 SBSQ HOSP IP/OBS HIGH 50: CPT | Mod: 24 | Performed by: INTERNAL MEDICINE

## 2023-07-04 PROCEDURE — 85027 COMPLETE CBC AUTOMATED: CPT

## 2023-07-04 PROCEDURE — 80053 COMPREHEN METABOLIC PANEL: CPT

## 2023-07-04 PROCEDURE — 120N000002 HC R&B MED SURG/OB UMMC

## 2023-07-04 PROCEDURE — 87077 CULTURE AEROBIC IDENTIFY: CPT

## 2023-07-04 PROCEDURE — 250N000012 HC RX MED GY IP 250 OP 636 PS 637

## 2023-07-04 RX ORDER — ENOXAPARIN SODIUM 100 MG/ML
40 INJECTION SUBCUTANEOUS EVERY 24 HOURS
Status: DISCONTINUED | OUTPATIENT
Start: 2023-07-04 | End: 2023-07-05 | Stop reason: HOSPADM

## 2023-07-04 RX ADMIN — AZATHIOPRINE 150 MG: 50 TABLET ORAL at 21:47

## 2023-07-04 RX ADMIN — PRENATAL VIT W/ FE FUMARATE-FA TAB 27-0.8 MG 1 TABLET: 27-0.8 TAB at 20:42

## 2023-07-04 RX ADMIN — MAGNESIUM OXIDE TAB 400 MG (241.3 MG ELEMENTAL MG) 800 MG: 400 (241.3 MG) TAB at 10:02

## 2023-07-04 RX ADMIN — ACETAMINOPHEN 650 MG: 325 TABLET, FILM COATED ORAL at 16:42

## 2023-07-04 RX ADMIN — TACROLIMUS 5 MG: 5 CAPSULE ORAL at 21:47

## 2023-07-04 RX ADMIN — LEVOTHYROXINE SODIUM 50 MCG: 25 TABLET ORAL at 07:30

## 2023-07-04 RX ADMIN — TACROLIMUS 5 MG: 5 CAPSULE ORAL at 10:02

## 2023-07-04 RX ADMIN — ASPIRIN 81 MG CHEWABLE TABLET 81 MG: 81 TABLET CHEWABLE at 21:47

## 2023-07-04 RX ADMIN — ENOXAPARIN SODIUM 40 MG: 40 INJECTION SUBCUTANEOUS at 14:02

## 2023-07-04 RX ADMIN — TACROLIMUS 2 MG: 1 CAPSULE ORAL at 10:02

## 2023-07-04 RX ADMIN — CEFTRIAXONE SODIUM 2 G: 2 INJECTION, POWDER, FOR SOLUTION INTRAMUSCULAR; INTRAVENOUS at 20:42

## 2023-07-04 RX ADMIN — CYANOCOBALAMIN TAB 1000 MCG 1000 MCG: 1000 TAB at 21:47

## 2023-07-04 RX ADMIN — TACROLIMUS 2 MG: 1 CAPSULE ORAL at 21:50

## 2023-07-04 RX ADMIN — SODIUM BICARBONATE 650 MG TABLET 650 MG: at 20:42

## 2023-07-04 RX ADMIN — MAGNESIUM OXIDE TAB 400 MG (241.3 MG ELEMENTAL MG) 800 MG: 400 (241.3 MG) TAB at 21:48

## 2023-07-04 RX ADMIN — SODIUM BICARBONATE 650 MG TABLET 650 MG: at 10:02

## 2023-07-04 ASSESSMENT — ACTIVITIES OF DAILY LIVING (ADL)
ADLS_ACUITY_SCORE: 20

## 2023-07-04 NOTE — CARE PLAN
Patient's vitals stable and afebrile. The patient denies decreased fetal movement, vaginal bleeding, contractions, headache, vision changes, and LOF. The blood cultures for patient are pending. Patient had a nice nap and got tylenol for her sore back. The patient desires to take a shower tonight after the IV antibiotics.

## 2023-07-04 NOTE — PROGRESS NOTES
General ID Service: Follow Up Note      Patient:  Mona Wagner, Date of birth 1990, Medical record number [id number]  Date of Visit:  2023         Assessment and Recommendations:   ID Problem List:     28w pregnant  Hx of renal transplant c/b hydronephrosis 2/2 ureteral stenosis with nephrostomy tube  Fever and UTI dx at clinic on   -culture growing E coli  Blood cultures with E coli 1/2  Mike with Cr 1.47 on admission, improving with fluids    Recommendations:    -agree with ceftriaxone 2 gm daily for bacteremia  -question to OB/Gyn should we do secondary prophylaxis for pyelonephritis?     Discussion:    Patient who is 28 weeks pregnant with a hx of a renal transplant who presents with E coli UTI and bacteremia. The E coli in her urine has sensitivities which show ceftriaxone is sensitive. Therefore she is on appropriate therapy.      We generally treat GNR bacteremia for 7-10 day however it can be oral after 48 hours if patient is stable and well. This may be a challenge given her bactrim allergy and pregnancy. I discussed with pharmacy and we don't see an oral that would work. Flouroquinolones are not recommended in pregnancy and the augmentin is I for her E coli.  She will likely need a picc and continue IV ceftriaxone.     I discussed with patient today. She would hope to avoid a picc. If repeat blood cultures remain negative tomorrow she can go and plan for 7 days. I am ok with her going to an infusion center with PIVs to complete the course.      A question for her MFM team is if she need secondary prophylaxis with an oral cephalosporin. It is probably reasonable in this high risk patient. An oral cephalosporin is reasonable for prophylaxis but not treatment.       We will continue to follow along. Don't hesitate to call with questions    Don't hesitate to call with questions.     Attestation:  I have reviewed today's vital signs, medications, labs and imaging.  Delicia Malcolm MD   Pager [phone]          Interval History:       Patient feels better. No fevers or chills. No n/v/d.          Review of Systems:   Full 9 pt ROS obtained, pertinent positives and negatives as above.          Current Antimicrobials   Current:    Ceftriaxone 2 gram daily         Physical Exam:   Ranges for vital signs:  Temp:  [98.2  F (36.8  C)-99.9  F (37.7  C)] 98.2  F (36.8  C)  Resp:  [16-18] 16  BP: ()/(51-69) 103/55    Intake/Output Summary (Last 24 hours) at 7/4/2023 1051  Last data filed at 7/4/2023 0749  Gross per 24 hour   Intake 1850 ml   Output 1875 ml   Net -25 ml     Exam:  GENERAL:  well-developed, well-nourished, sitting in bed in no acute distress.   ENT:  Head is normocephalic, atraumatic. Oropharynx is moist without exudates or ulcers.  EYES:  Eyes have anicteric sclerae.    NECK:  Supple.  LUNGS:  Clear to auscultation.  CARDIOVASCULAR:  Regular rate and rhythm with no murmurs, gallops or rubs.  ABDOMEN:  Normal bowel sounds, soft, nontender.  EXT: Extremities warm and without edema.  SKIN:  No acute rashes.  Line is in place without any surrounding erythema.  NEUROLOGIC:  Grossly nonfocal.         Laboratory Data:   Reviewed.  Pertinent for:    WBC 11.0  Cr 1.28    Culture data:    Collected Updated Procedure Result Status    07/04/2023 0817 07/04/2023 0822 Blood Culture Hand, Right [[id number]]   Blood from Hand, Right    In process Component Value   No component results             07/04/2023 0743 07/04/2023 0822 Blood Culture Arm, Right [[id number]]   Blood from Arm, Right    In process Component Value   No component results             07/03/2023 2057 07/03/2023 2102 Blood Culture Arm, Right [[id number]]   Blood from Arm, Right    In process Component Value   No component results             07/03/2023 1943 07/04/2023 0931 Blood Culture Arm, Right [[id number]]   Blood from Arm, Right    Preliminary result Component Value   Culture No growth after 12 hours P              07/02/2023 1924 07/04/2023 0942 Blood Culture Hand, Right [[id number]]   (Abnormal)   Blood from Hand, Right    Preliminary result Component Value   Culture Positive on the 1st day of incubation Abnormal  P    Escherichia coli Panic  P    1 of 2 bottles             07/02/2023 1924 07/03/2023 2231 Blood Culture Hand, Right [[id number]]   Blood from Hand, Right    Preliminary result Component Value   Culture No growth after 1 day P             07/02/2023 1924 07/03/2023 1234 Verigene GN Panel [[id number]]    (Abnormal)   Blood from Hand, Right    Final result Component Value   Acinetobacter species Not Detected   Citrobacter species Not Detected   Enterobacter species Not Detected   Proteus species Not Detected   Escherichia coli Detected Abnormal    Positive for Escherichia coli by [employer] multiplex nucleic acid test. Final identification and antimicrobial susceptibility testing will be verified by standard methods. Verigene test will not distinguish E. coli from Shigella species including Shigella dysenteriae, Shigella flexneri, Shigella boydii, and Shigella sonnei. Specimens containing Shigella species or E. coli will be reported as positive for E. coli.   Klebsiella pneumoniae Not Detected   Klebsiella oxytoca Not Detected   Pseudomonas aeruginosa Not Detected   CTX-M Not Detected   KPC Not Detected   NDM Not Detected   VIM Not Detected   IMP Not Detected   OXA Not Detected          06/30/2023 1116 07/04/2023 0743 Urine Culture [[id number]]     (Abnormal)   Urine, NOS    Edited Result - FINAL Component Value   Culture >100,000 CFU/mL Escherichia coli Abnormal        Susceptibility     Escherichia coli     LINDA     Ampicillin >=32 ug/mL Resistant     Ampicillin/ Sulbactam 16 ug/mL Intermediate     Cefazolin <=4 ug/mL Susceptible 1     Cefepime <=1 ug/mL Susceptible     Cefoxitin <=4 ug/mL Susceptible     Ceftazidime <=1 ug/mL Susceptible     Ceftriaxone <=1 ug/mL Susceptible     Ciprofloxacin >=4 ug/mL  Resistant     Gentamicin <=1 ug/mL Susceptible     Levofloxacin >=8 ug/mL Resistant     Nitrofurantoin <=16 ug/mL Susceptible     Piperacillin/Tazobactam <=4 ug/mL Susceptible     Tobramycin <=1 ug/mL Susceptible     Trimethoprim/Sulfamethoxazole <=1/19 ug/mL Susceptible              1 Cefazolin LINDA breakpoints are for the treatment of uncomplicated urinary tract infections. For the treatment of systemic infections, please contact the laboratory for additional testing.                    Imaging:     Reviewed.

## 2023-07-04 NOTE — PROGRESS NOTES
MFM Antepartum Progress Note    Subjective:Mona states she is feeling better today, has not taken Tylenol since 10 PM and has not noted fever, rigors or myalgias.  Her lower abdominal pain is stable.  She denies vaginal bleeding, LOF or regular contractions.      Objective:  Vitals:    07/03/23 2335 07/04/23 0335 07/04/23 0729 07/04/23 0900   BP: 99/58 90/51 103/55    BP Location: Right arm Right arm Right arm    Patient Position: Semi-Carrasco's Supine Semi-Carrasco's    Cuff Size: Adult Regular Adult Regular Adult Regular    Pulse:       Resp: 18 18 16    Temp: 98.4  F (36.9  C) 98.8  F (37.1  C) 98.2  F (36.8  C)    TempSrc: Oral Oral Oral    Weight:    78.9 kg (174 lb)       I/O last 3 completed shifts:  In: 2600 [P.O.:2600]  Out: 2100 [Urine:2100]    Gen: Resting comfortably in bed, NAD  CV: Well-perfused   Resp: Breathing non-labored   Abd: Gravid, non-tender, non-distended  Ext: non-tender, no edema    FHT: , moderate variability, + accels, no decels  [place]: No regular contractions    Component      Latest Ref Rng 7/2/2023  5:04 PM   Sodium      136 - 145 mmol/L 132 (L)    Potassium      3.4 - 5.3 mmol/L 4.7    Chloride      98 - 107 mmol/L 103    Carbon Dioxide (CO2)      22 - 29 mmol/L 18 (L)    Anion Gap      7 - 15 mmol/L 11    Urea Nitrogen      6.0 - 20.0 mg/dL 27.9 (H)    Creatinine      0.51 - 0.95 mg/dL 1.47 (H)    Calcium      8.6 - 10.0 mg/dL 8.9    Glucose      70 - 99 mg/dL 122 (H)    Alkaline Phosphatase      35 - 104 U/L 100    AST      0 - 45 U/L 12    ALT      0 - 50 U/L 11    Protein Total      6.4 - 8.3 g/dL 7.2    Albumin      3.5 - 5.2 g/dL 3.6    Bilirubin Total      <=1.2 mg/dL 0.8    GFR Estimate      >60 mL/min/1.73m2 49 (L)    Color Urine      Colorless, Straw, Light Yellow, Yellow     Appearance Urine      Clear     Glucose Urine      Negative mg/dL    Bilirubin Urine      Negative     Ketones Urine      Negative mg/dL    Specific Gravity Urine      1.003 - 1.035     Blood Urine       Negative     pH Urine      5.0 - 7.0     Protein Albumin Urine      Negative mg/dL    Urobilinogen mg/dL      Normal, 2.0 mg/dL    Nitrite Urine      Negative     Leukocyte Esterase Urine      Negative     Bacteria Urine      None Seen /HPF    WBC Clumps      None Seen /HPF    RBC Urine      <=2 /HPF    WBC Urine      <=5 /HPF    WBC      4.0 - 11.0 10e3/uL 11.5 (H)    RBC Count      3.80 - 5.20 10e6/uL 2.77 (L)    Hemoglobin      11.7 - 15.7 g/dL 8.7 (L)    Hematocrit      35.0 - 47.0 % 27.1 (L)    MCV      78 - 100 fL 98    MCH      26.5 - 33.0 pg 31.4    MCHC      31.5 - 36.5 g/dL 32.1    RDW      10.0 - 15.0 % 14.1    Platelet Count      150 - 450 10e3/uL 176    ABO/Rh(D)    Antibody Screen      Negative     SPECIMEN EXPIRATION DATE    Tacrolimus by Tandem Mass Spectrometry      5.0 - 15.0 ug/L    Tacrolimus Last Dose Date    Tacrolimus Last Dose Time    Lactic Acid      0.7 - 2.0 mmol/L    TSH      0.30 - 4.20 uIU/mL      Component      Latest Ref Rn 7/2/2023  7:16 PM   Sodium      136 - 145 mmol/L    Potassium      3.4 - 5.3 mmol/L    Chloride      98 - 107 mmol/L    Carbon Dioxide (CO2)      22 - 29 mmol/L    Anion Gap      7 - 15 mmol/L    Urea Nitrogen      6.0 - 20.0 mg/dL    Creatinine      0.51 - 0.95 mg/dL    Calcium      8.6 - 10.0 mg/dL    Glucose      70 - 99 mg/dL    Alkaline Phosphatase      35 - 104 U/L    AST      0 - 45 U/L    ALT      0 - 50 U/L    Protein Total      6.4 - 8.3 g/dL    Albumin      3.5 - 5.2 g/dL    Bilirubin Total      <=1.2 mg/dL    GFR Estimate      >60 mL/min/1.73m2    Color Urine      Colorless, Straw, Light Yellow, Yellow  Yellow    Appearance Urine      Clear  Slightly Cloudy !    Glucose Urine      Negative mg/dL Negative    Bilirubin Urine      Negative  Negative    Ketones Urine      Negative mg/dL Negative    Specific Gravity Urine      1.003 - 1.035  1.010    Blood Urine      Negative  Moderate !    pH Urine      5.0 - 7.0  5.5    Protein Albumin Urine       Negative mg/dL 50 !    Urobilinogen mg/dL      Normal, 2.0 mg/dL Normal    Nitrite Urine      Negative  Positive !    Leukocyte Esterase Urine      Negative  Large !    Bacteria Urine      None Seen /HPF Few !    WBC Clumps      None Seen /HPF Present !    RBC Urine      <=2 /HPF 2    WBC Urine      <=5 /HPF 13 (H)    WBC      4.0 - 11.0 10e3/uL    RBC Count      3.80 - 5.20 10e6/uL    Hemoglobin      11.7 - 15.7 g/dL    Hematocrit      35.0 - 47.0 %    MCV      78 - 100 fL    MCH      26.5 - 33.0 pg    MCHC      31.5 - 36.5 g/dL    RDW      10.0 - 15.0 %    Platelet Count      150 - 450 10e3/uL    ABO/Rh(D)    Antibody Screen      Negative     SPECIMEN EXPIRATION DATE    Tacrolimus by Tandem Mass Spectrometry      5.0 - 15.0 ug/L    Tacrolimus Last Dose Date    Tacrolimus Last Dose Time    Lactic Acid      0.7 - 2.0 mmol/L    TSH      0.30 - 4.20 uIU/mL      Component      Latest Ref [facility] 7/2/2023  7:24 PM   Sodium      136 - 145 mmol/L    Potassium      3.4 - 5.3 mmol/L    Chloride      98 - 107 mmol/L    Carbon Dioxide (CO2)      22 - 29 mmol/L    Anion Gap      7 - 15 mmol/L    Urea Nitrogen      6.0 - 20.0 mg/dL    Creatinine      0.51 - 0.95 mg/dL    Calcium      8.6 - 10.0 mg/dL    Glucose      70 - 99 mg/dL    Alkaline Phosphatase      35 - 104 U/L    AST      0 - 45 U/L    ALT      0 - 50 U/L    Protein Total      6.4 - 8.3 g/dL    Albumin      3.5 - 5.2 g/dL    Bilirubin Total      <=1.2 mg/dL    GFR Estimate      >60 mL/min/1.73m2    Color Urine      Colorless, Straw, Light Yellow, Yellow     Appearance Urine      Clear     Glucose Urine      Negative mg/dL    Bilirubin Urine      Negative     Ketones Urine      Negative mg/dL    Specific Gravity Urine      1.003 - 1.035     Blood Urine      Negative     pH Urine      5.0 - 7.0     Protein Albumin Urine      Negative mg/dL    Urobilinogen mg/dL      Normal, 2.0 mg/dL    Nitrite Urine      Negative     Leukocyte Esterase Urine      Negative     Bacteria  Urine      None Seen /HPF    WBC Clumps      None Seen /HPF    RBC Urine      <=2 /HPF    WBC Urine      <=5 /HPF    WBC      4.0 - 11.0 10e3/uL    RBC Count      3.80 - 5.20 10e6/uL    Hemoglobin      11.7 - 15.7 g/dL    Hematocrit      35.0 - 47.0 %    MCV      78 - 100 fL    MCH      26.5 - 33.0 pg    MCHC      31.5 - 36.5 g/dL    RDW      10.0 - 15.0 %    Platelet Count      150 - 450 10e3/uL    ABO/Rh(D)    Antibody Screen      Negative     SPECIMEN EXPIRATION DATE    Tacrolimus by Tandem Mass Spectrometry      5.0 - 15.0 ug/L    Tacrolimus Last Dose Date    Tacrolimus Last Dose Time    Lactic Acid      0.7 - 2.0 mmol/L 1.0    TSH      0.30 - 4.20 uIU/mL      Component      Latest Ref Rng 7/2/2023  10:04 PM   Sodium      136 - 145 mmol/L    Potassium      3.4 - 5.3 mmol/L    Chloride      98 - 107 mmol/L    Carbon Dioxide (CO2)      22 - 29 mmol/L    Anion Gap      7 - 15 mmol/L    Urea Nitrogen      6.0 - 20.0 mg/dL    Creatinine      0.51 - 0.95 mg/dL    Calcium      8.6 - 10.0 mg/dL    Glucose      70 - 99 mg/dL    Alkaline Phosphatase      35 - 104 U/L    AST      0 - 45 U/L    ALT      0 - 50 U/L    Protein Total      6.4 - 8.3 g/dL    Albumin      3.5 - 5.2 g/dL    Bilirubin Total      <=1.2 mg/dL    GFR Estimate      >60 mL/min/1.73m2    Color Urine      Colorless, Straw, Light Yellow, Yellow     Appearance Urine      Clear     Glucose Urine      Negative mg/dL    Bilirubin Urine      Negative     Ketones Urine      Negative mg/dL    Specific Gravity Urine      1.003 - 1.035     Blood Urine      Negative     pH Urine      5.0 - 7.0     Protein Albumin Urine      Negative mg/dL    Urobilinogen mg/dL      Normal, 2.0 mg/dL    Nitrite Urine      Negative     Leukocyte Esterase Urine      Negative     Bacteria Urine      None Seen /HPF    WBC Clumps      None Seen /HPF    RBC Urine      <=2 /HPF    WBC Urine      <=5 /HPF    WBC      4.0 - 11.0 10e3/uL    RBC Count      3.80 - 5.20 10e6/uL    Hemoglobin       11.7 - 15.7 g/dL    Hematocrit      35.0 - 47.0 %    MCV      78 - 100 fL    MCH      26.5 - 33.0 pg    MCHC      31.5 - 36.5 g/dL    RDW      10.0 - 15.0 %    Platelet Count      150 - 450 10e3/uL    ABO/Rh(D) O POS    Antibody Screen      Negative  Negative    SPECIMEN EXPIRATION DATE [id number]    Tacrolimus by Tandem Mass Spectrometry      5.0 - 15.0 ug/L    Tacrolimus Last Dose Date    Tacrolimus Last Dose Time    Lactic Acid      0.7 - 2.0 mmol/L    TSH      0.30 - 4.20 uIU/mL      Component      Latest Ref Rng 7/3/2023  7:54 AM   Sodium      136 - 145 mmol/L 136    Potassium      3.4 - 5.3 mmol/L 5.1    Chloride      98 - 107 mmol/L 106    Carbon Dioxide (CO2)      22 - 29 mmol/L 18 (L)    Anion Gap      7 - 15 mmol/L 12    Urea Nitrogen      6.0 - 20.0 mg/dL 23.9 (H)    Creatinine      0.51 - 0.95 mg/dL 1.37 (H)    Calcium      8.6 - 10.0 mg/dL 9.0    Glucose      70 - 99 mg/dL 108 (H)    Alkaline Phosphatase      35 - 104 U/L 105 (H)    AST      0 - 45 U/L 14    ALT      0 - 50 U/L 10    Protein Total      6.4 - 8.3 g/dL 7.3    Albumin      3.5 - 5.2 g/dL 3.4 (L)    Bilirubin Total      <=1.2 mg/dL 0.7    GFR Estimate      >60 mL/min/1.73m2 53 (L)    Color Urine      Colorless, Straw, Light Yellow, Yellow     Appearance Urine      Clear     Glucose Urine      Negative mg/dL    Bilirubin Urine      Negative     Ketones Urine      Negative mg/dL    Specific Gravity Urine      1.003 - 1.035     Blood Urine      Negative     pH Urine      5.0 - 7.0     Protein Albumin Urine      Negative mg/dL    Urobilinogen mg/dL      Normal, 2.0 mg/dL    Nitrite Urine      Negative     Leukocyte Esterase Urine      Negative     Bacteria Urine      None Seen /HPF    WBC Clumps      None Seen /HPF    RBC Urine      <=2 /HPF    WBC Urine      <=5 /HPF    WBC      4.0 - 11.0 10e3/uL 11.4 (H)    RBC Count      3.80 - 5.20 10e6/uL 2.73 (L)    Hemoglobin      11.7 - 15.7 g/dL 8.4 (L)    Hematocrit      35.0 - 47.0 % 26.6 (L)     MCV      78 - 100 fL 97    MCH      26.5 - 33.0 pg 30.8    MCHC      31.5 - 36.5 g/dL 31.6    RDW      10.0 - 15.0 % 14.3    Platelet Count      150 - 450 10e3/uL 188    ABO/Rh(D)    Antibody Screen      Negative     SPECIMEN EXPIRATION DATE    Tacrolimus by Tandem Mass Spectrometry      5.0 - 15.0 ug/L    Tacrolimus Last Dose Date    Tacrolimus Last Dose Time    Lactic Acid      0.7 - 2.0 mmol/L    TSH      0.30 - 4.20 uIU/mL 0.83      Component      Latest Ref Rng 7/3/2023  10:29 AM   Sodium      136 - 145 mmol/L    Potassium      3.4 - 5.3 mmol/L    Chloride      98 - 107 mmol/L    Carbon Dioxide (CO2)      22 - 29 mmol/L    Anion Gap      7 - 15 mmol/L    Urea Nitrogen      6.0 - 20.0 mg/dL    Creatinine      0.51 - 0.95 mg/dL    Calcium      8.6 - 10.0 mg/dL    Glucose      70 - 99 mg/dL    Alkaline Phosphatase      35 - 104 U/L    AST      0 - 45 U/L    ALT      0 - 50 U/L    Protein Total      6.4 - 8.3 g/dL    Albumin      3.5 - 5.2 g/dL    Bilirubin Total      <=1.2 mg/dL    GFR Estimate      >60 mL/min/1.73m2    Color Urine      Colorless, Straw, Light Yellow, Yellow     Appearance Urine      Clear     Glucose Urine      Negative mg/dL    Bilirubin Urine      Negative     Ketones Urine      Negative mg/dL    Specific Gravity Urine      1.003 - 1.035     Blood Urine      Negative     pH Urine      5.0 - 7.0     Protein Albumin Urine      Negative mg/dL    Urobilinogen mg/dL      Normal, 2.0 mg/dL    Nitrite Urine      Negative     Leukocyte Esterase Urine      Negative     Bacteria Urine      None Seen /HPF    WBC Clumps      None Seen /HPF    RBC Urine      <=2 /HPF    WBC Urine      <=5 /HPF    WBC      4.0 - 11.0 10e3/uL    RBC Count      3.80 - 5.20 10e6/uL    Hemoglobin      11.7 - 15.7 g/dL    Hematocrit      35.0 - 47.0 %    MCV      78 - 100 fL    MCH      26.5 - 33.0 pg    MCHC      31.5 - 36.5 g/dL    RDW      10.0 - 15.0 %    Platelet Count      150 - 450 10e3/uL    ABO/Rh(D)    Antibody  Screen      Negative     SPECIMEN EXPIRATION DATE    Tacrolimus by Tandem Mass Spectrometry      5.0 - 15.0 ug/L 4.6 (L)    Tacrolimus Last Dose Date --    Tacrolimus Last Dose Time --    Lactic Acid      0.7 - 2.0 mmol/L    TSH      0.30 - 4.20 uIU/mL      Component      Latest Ref Rng 7/4/2023  8:17 AM   Sodium      136 - 145 mmol/L 134 (L)    Potassium      3.4 - 5.3 mmol/L 5.0    Chloride      98 - 107 mmol/L 104    Carbon Dioxide (CO2)      22 - 29 mmol/L 16 (L)    Anion Gap      7 - 15 mmol/L 14    Urea Nitrogen      6.0 - 20.0 mg/dL 22.2 (H)    Creatinine      0.51 - 0.95 mg/dL 1.28 (H)    Calcium      8.6 - 10.0 mg/dL 9.0    Glucose      70 - 99 mg/dL 83    Alkaline Phosphatase      35 - 104 U/L 96    AST      0 - 45 U/L 13    ALT      0 - 50 U/L 9    Protein Total      6.4 - 8.3 g/dL 6.8    Albumin      3.5 - 5.2 g/dL 3.3 (L)    Bilirubin Total      <=1.2 mg/dL 0.5    GFR Estimate      >60 mL/min/1.73m2 58 (L)    Color Urine      Colorless, Straw, Light Yellow, Yellow     Appearance Urine      Clear     Glucose Urine      Negative mg/dL    Bilirubin Urine      Negative     Ketones Urine      Negative mg/dL    Specific Gravity Urine      1.003 - 1.035     Blood Urine      Negative     pH Urine      5.0 - 7.0     Protein Albumin Urine      Negative mg/dL    Urobilinogen mg/dL      Normal, 2.0 mg/dL    Nitrite Urine      Negative     Leukocyte Esterase Urine      Negative     Bacteria Urine      None Seen /HPF    WBC Clumps      None Seen /HPF    RBC Urine      <=2 /HPF    WBC Urine      <=5 /HPF    WBC      4.0 - 11.0 10e3/uL 11.0    RBC Count      3.80 - 5.20 10e6/uL 2.74 (L)    Hemoglobin      11.7 - 15.7 g/dL 8.5 (L)    Hematocrit      35.0 - 47.0 % 27.0 (L)    MCV      78 - 100 fL 99    MCH      26.5 - 33.0 pg 31.0    MCHC      31.5 - 36.5 g/dL 31.5    RDW      10.0 - 15.0 % 14.2    Platelet Count      150 - 450 10e3/uL 199    ABO/Rh(D)    Antibody Screen      Negative     SPECIMEN EXPIRATION DATE     Tacrolimus by Tandem Mass Spectrometry      5.0 - 15.0 ug/L    Tacrolimus Last Dose Date    Tacrolimus Last Dose Time    Lactic Acid      0.7 - 2.0 mmol/L    TSH      0.30 - 4.20 uIU/mL       Legend:  (L) Low  (H) High  ! Abnormal    Assessment: Mona Wagner is a 32 year old  @ 28w4d by LMP here for E. Coli pyelonephritis with bacteremia in the setting of renal transplant w/ PNT in place. Patient presenting with one day of subjective fevers at home and outpatient Ucx from PNT w/ e. Coli UTI. Patient febrile to 100.7 w/ WBC of 11.5 and creatinine elevated from baseline at 1.47. Patient otherwise asymptomatic with normal vital signs. Patient admitted for IV antibiotics and blood cultures on first day grew e.coli, treating with IV ceftriaxone per ID recommendations.      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 Coli UTI, c/f pyelonephritis, r/o urosepsis   Fevers  - Follows with Dr. Mcintyre in transplant nephrology; ID consult: reccommended 1) repeat blood culture today and tomorrow (done) 2) 2g IV ceftriaxone daily 7-10 days, noted that there were not oral options. We have asked micro to evaluate if additional sensitivities can be added to determine an oral antibiotic option, awaiting response (*[id number]).   - Continue with Tacrolimus (goal 4-6) and Azathioprine as prescribed by nephrology. Tacro level last , w/in goal; 7/3AM Tacro level ord'd, now pending   - Nephrostomy tube in place since . Good urine output.   - Urine culture  significant for e.coli; blood culture positive gram negative rods, ID consulted per above   - Creatinine trend 1.31 on  -> 1.28 today  - Tmax/last 100.7/2 on admission, now afebrile   - WBC 11.5. lactate 1.0, repeat pending.   - D#3 Ceftriaxone   - Mona does not want to proceed with PICC placement and therefore we will look into alternative options of possible PO antibiotics (see above) vs home/infusion therapy center with peripheral IV for 10 day  total treatment course.   - Once treatment is completed, will plan for prophylactic therapy with Keflex 500 mg PO daily     Hypothyroidism:  Secondary Hyperparathyroidism with hypercalcemia   - Resection of parathyroid glands on 4/28.   - Follows with endo. Continue current levothyroxine prescription 50 mcg daily.  - 6/16/23 TSH 2.51; today 0.83, within the range of recent TSH measurements for patient in the last 4 months  - Endocrinology next visit 7/28     Hx DVT:  - Assessment for inherited thrombophilias including Factor V Leiden and Prothrombin Gene Mutation:  Negative.  - Additional hypercoagulability work up negative.  - Will consider DVT prophylaxis if admitted for prolonged period of time      Hx of bacterial endocarditis:  - Echo on 3/20:  Interpretation Summary  Global and regional left ventricular function is hyperkinetic with an EF >70%.  Right ventricular function, chamber size, wall motion, and thickness are  normal.  Pulmonary artery systolic pressure is normal.  The inferior vena cava cannot be assessed.  No pericardial effusion is present.     FATOUMATA  - Continue PTA CPAP      Routine PNC:  - Prenatal labs:               Rh: +  antibody: neg               HepB/HIV/RPR/HepC: nonreactive               GC/CT: neg               Rubella: non-immune  Varicella: non-immune               GCT: passed early GCT. Will repeat today               UC: no growth               Pap: NIL and HPV neg  - Immunizations: s/p Flu and Covid. Due for Tdap. Will offer during admission   - Genetic screening: NIPT low-risk. Low-risk NT .      FGR AC 9% with normal UAR:   - Serial growth US q3 weeks, next 7/19   - Weekly NST with UAR, next 7/5      40 MINUTES SPENT BY ME on the date of service doing chart review, history, exam, documentation & further activities per the note.    I have personally reviewed the following data over the past 24 hrs:    11.0  \   8.5 (L)   / 199     134 (L) 104 22.2 (H) /  83   5.0 16 (L) 1.28 (H) \        ALT: 9 AST: 13 AP: 96 TBILI: 0.5   ALB: 3.3 (L) TOT PROTEIN: 6.8 LIPASE: N/A         Tiffany Montiel MD  Date of Service (when I saw the patient): 07/04/23

## 2023-07-04 NOTE — PLAN OF CARE
Data: Today is hospital day 3. Maternal status afebrile, denies chills and fever since last night. Fetal assessment is appropriate for gestational age and see flow sheets.   Action: Continue with plan of care, which is IV antibiotics once a day. Patient encouraged to move extremities while in bed and keep SCD's, and on daily Lovenox started today.  Response: Patient coping and feels better today  Goal Outcome Evaluation: blood cultures pending

## 2023-07-04 NOTE — PLAN OF CARE
VSS. Pt reports feeling better after evening dose of antibiotics. BP's have been trending lower & provider is aware. Plan per provider is to encourage more drinking of fluids PO. Pt denies any cramping, headache, visual disturbances, epigastric pain, LOF, or bleeding this morning. She was somewhat able to get some sleep. EFM normal, see flow sheets for more. Nephrostomy dressing changed at bedtime. Continue plan of care.

## 2023-07-05 ENCOUNTER — APPOINTMENT (OUTPATIENT)
Dept: ULTRASOUND IMAGING | Facility: CLINIC | Age: 33
DRG: 832 | End: 2023-07-05
Attending: OBSTETRICS & GYNECOLOGY
Payer: MEDICARE

## 2023-07-05 ENCOUNTER — HOME INFUSION (PRE-WILLOW HOME INFUSION) (OUTPATIENT)
Dept: PHARMACY | Facility: CLINIC | Age: 33
End: 2023-07-05

## 2023-07-05 ENCOUNTER — APPOINTMENT (OUTPATIENT)
Dept: ULTRASOUND IMAGING | Facility: CLINIC | Age: 33
DRG: 832 | End: 2023-07-05
Payer: MEDICARE

## 2023-07-05 VITALS
DIASTOLIC BLOOD PRESSURE: 62 MMHG | SYSTOLIC BLOOD PRESSURE: 116 MMHG | TEMPERATURE: 97.6 F | HEART RATE: 105 BPM | BODY MASS INDEX: 33.98 KG/M2 | RESPIRATION RATE: 16 BRPM | WEIGHT: 174 LBS

## 2023-07-05 DIAGNOSIS — Z94.0 KIDNEY REPLACED BY TRANSPLANT: ICD-10-CM

## 2023-07-05 DIAGNOSIS — R78.81 BACTEREMIA: Primary | ICD-10-CM

## 2023-07-05 DIAGNOSIS — E86.0 DEHYDRATION: ICD-10-CM

## 2023-07-05 DIAGNOSIS — Z48.298 AFTERCARE FOLLOWING ORGAN TRANSPLANT: ICD-10-CM

## 2023-07-05 LAB
BACTERIA BLD CULT: ABNORMAL
BACTERIA BLD CULT: ABNORMAL
CLUE CELLS: NORMAL
TRICHOMONAS, WET PREP: NORMAL
WBC'S/HIGH POWER FIELD, WET PREP: NORMAL
YEAST, WET PREP: NORMAL

## 2023-07-05 PROCEDURE — 76815 OB US LIMITED FETUS(S): CPT | Mod: 26 | Performed by: OBSTETRICS & GYNECOLOGY

## 2023-07-05 PROCEDURE — 99233 SBSQ HOSP IP/OBS HIGH 50: CPT | Mod: 24 | Performed by: INTERNAL MEDICINE

## 2023-07-05 PROCEDURE — 250N000011 HC RX IP 250 OP 636: Mod: JZ

## 2023-07-05 PROCEDURE — 250N000012 HC RX MED GY IP 250 OP 636 PS 637

## 2023-07-05 PROCEDURE — 99239 HOSP IP/OBS DSCHRG MGMT >30: CPT | Mod: 25 | Performed by: OBSTETRICS & GYNECOLOGY

## 2023-07-05 PROCEDURE — 76820 UMBILICAL ARTERY ECHO: CPT

## 2023-07-05 PROCEDURE — 59025 FETAL NON-STRESS TEST: CPT | Mod: 26 | Performed by: OBSTETRICS & GYNECOLOGY

## 2023-07-05 PROCEDURE — 250N000013 HC RX MED GY IP 250 OP 250 PS 637

## 2023-07-05 PROCEDURE — 76776 US EXAM K TRANSPL W/DOPPLER: CPT | Mod: 26 | Performed by: RADIOLOGY

## 2023-07-05 PROCEDURE — 76776 US EXAM K TRANSPL W/DOPPLER: CPT

## 2023-07-05 PROCEDURE — 87210 SMEAR WET MOUNT SALINE/INK: CPT

## 2023-07-05 RX ORDER — LACTOBACILLUS RHAMNOSUS GG 10B CELL
1 CAPSULE ORAL 2 TIMES DAILY
Status: DISCONTINUED | OUTPATIENT
Start: 2023-07-05 | End: 2023-07-05 | Stop reason: HOSPADM

## 2023-07-05 RX ORDER — DIPHENHYDRAMINE HYDROCHLORIDE 50 MG/ML
50 INJECTION INTRAMUSCULAR; INTRAVENOUS
Status: CANCELLED
Start: 2023-07-05

## 2023-07-05 RX ORDER — HEPARIN SODIUM (PORCINE) LOCK FLUSH IV SOLN 100 UNIT/ML 100 UNIT/ML
5 SOLUTION INTRAVENOUS
Status: CANCELLED | OUTPATIENT
Start: 2023-07-06

## 2023-07-05 RX ORDER — HEPARIN SODIUM,PORCINE 10 UNIT/ML
5-20 VIAL (ML) INTRAVENOUS DAILY PRN
Status: CANCELLED | OUTPATIENT
Start: 2023-07-06

## 2023-07-05 RX ORDER — ALBUTEROL SULFATE 0.83 MG/ML
2.5 SOLUTION RESPIRATORY (INHALATION)
Status: CANCELLED | OUTPATIENT
Start: 2023-07-06

## 2023-07-05 RX ORDER — CEFTRIAXONE 2 G/1
2 INJECTION, POWDER, FOR SOLUTION INTRAMUSCULAR; INTRAVENOUS EVERY 24 HOURS
Status: DISCONTINUED | OUTPATIENT
Start: 2023-07-05 | End: 2023-07-05 | Stop reason: HOSPADM

## 2023-07-05 RX ORDER — EPINEPHRINE 1 MG/ML
0.3 INJECTION, SOLUTION, CONCENTRATE INTRAVENOUS EVERY 5 MIN PRN
Status: CANCELLED | OUTPATIENT
Start: 2023-07-06

## 2023-07-05 RX ORDER — ALBUTEROL SULFATE 90 UG/1
1-2 AEROSOL, METERED RESPIRATORY (INHALATION)
Status: CANCELLED
Start: 2023-07-05

## 2023-07-05 RX ORDER — METHYLPREDNISOLONE SODIUM SUCCINATE 125 MG/2ML
125 INJECTION, POWDER, LYOPHILIZED, FOR SOLUTION INTRAMUSCULAR; INTRAVENOUS
Status: CANCELLED
Start: 2023-07-06

## 2023-07-05 RX ORDER — CHOLECALCIFEROL (VITAMIN D3) 125 MCG
6000 CAPSULE ORAL 3 TIMES DAILY PRN
Status: DISCONTINUED | OUTPATIENT
Start: 2023-07-05 | End: 2023-07-05 | Stop reason: HOSPADM

## 2023-07-05 RX ORDER — METHYLPREDNISOLONE SODIUM SUCCINATE 125 MG/2ML
125 INJECTION, POWDER, LYOPHILIZED, FOR SOLUTION INTRAMUSCULAR; INTRAVENOUS
Status: CANCELLED
Start: 2023-07-05

## 2023-07-05 RX ORDER — CEFTRIAXONE 2 G/1
2 INJECTION, POWDER, FOR SOLUTION INTRAMUSCULAR; INTRAVENOUS ONCE
Status: CANCELLED
Start: 2023-07-07 | End: 2023-07-07

## 2023-07-05 RX ORDER — MEPERIDINE HYDROCHLORIDE 25 MG/ML
25 INJECTION INTRAMUSCULAR; INTRAVENOUS; SUBCUTANEOUS EVERY 30 MIN PRN
Status: CANCELLED | OUTPATIENT
Start: 2023-07-05

## 2023-07-05 RX ORDER — DIPHENHYDRAMINE HYDROCHLORIDE 50 MG/ML
50 INJECTION INTRAMUSCULAR; INTRAVENOUS
Status: CANCELLED
Start: 2023-07-06

## 2023-07-05 RX ORDER — ALBUTEROL SULFATE 0.83 MG/ML
2.5 SOLUTION RESPIRATORY (INHALATION)
Status: CANCELLED | OUTPATIENT
Start: 2023-07-05

## 2023-07-05 RX ORDER — EPINEPHRINE 1 MG/ML
0.3 INJECTION, SOLUTION, CONCENTRATE INTRAVENOUS EVERY 5 MIN PRN
Status: CANCELLED | OUTPATIENT
Start: 2023-07-05

## 2023-07-05 RX ORDER — ALBUTEROL SULFATE 90 UG/1
1-2 AEROSOL, METERED RESPIRATORY (INHALATION)
Status: CANCELLED
Start: 2023-07-06

## 2023-07-05 RX ORDER — CEPHALEXIN 500 MG/1
500 CAPSULE ORAL DAILY
Qty: 90 CAPSULE | Refills: 1 | Status: ON HOLD | OUTPATIENT
Start: 2023-07-09 | End: 2023-08-15

## 2023-07-05 RX ADMIN — MAGNESIUM OXIDE TAB 400 MG (241.3 MG ELEMENTAL MG) 800 MG: 400 (241.3 MG) TAB at 08:10

## 2023-07-05 RX ADMIN — TACROLIMUS 5 MG: 5 CAPSULE ORAL at 10:19

## 2023-07-05 RX ADMIN — Medication 1 CAPSULE: at 08:10

## 2023-07-05 RX ADMIN — CEFTRIAXONE 2 G: 2 INJECTION, POWDER, FOR SOLUTION INTRAMUSCULAR; INTRAVENOUS at 15:50

## 2023-07-05 RX ADMIN — ENOXAPARIN SODIUM 40 MG: 40 INJECTION SUBCUTANEOUS at 12:06

## 2023-07-05 RX ADMIN — LEVOTHYROXINE SODIUM 50 MCG: 25 TABLET ORAL at 08:10

## 2023-07-05 RX ADMIN — SODIUM BICARBONATE 650 MG TABLET 650 MG: at 08:10

## 2023-07-05 RX ADMIN — LACTASE TAB 3000 UNIT 3000 UNITS: 3000 TAB at 14:28

## 2023-07-05 RX ADMIN — TACROLIMUS 2 MG: 1 CAPSULE ORAL at 10:19

## 2023-07-05 ASSESSMENT — ACTIVITIES OF DAILY LIVING (ADL)
ADLS_ACUITY_SCORE: 20

## 2023-07-05 NOTE — PROVIDER NOTIFICATION
07/04/23 2156   Provider Notification   Provider Name/Title Dr Trinh Lr   Method of Notification Electronic Page   Notification Reason Other   Pt continuing to contract despite oral hydration.     Addendum: Per conversation with Dr Trinh Lr, take off monitor and if pt feels increase of contractions or they become painful, notify provider.  Continue to encourage oral hydration.

## 2023-07-05 NOTE — PROGRESS NOTES
Brief Progress Note     Paged to room that patient had cramp/contraction that awoke her from sleep. Also felt some pressure like she needed to void and went to the bathroom. Voided  mL from via urethra. Feels a little more pressure at PNT site. Aside from new urination, no LOF, VB. +FM. Has still been drinking lots of water. Good output through PNT.     Also desires probiotic in setting of antibiotics.     Per patient report, last digital exam during last hospitalization she was 1 cm.     /58 (BP Location: Right arm, Patient Position: Semi-Carrasco's, Cuff Size: Adult Regular)   Pulse 105   Temp 97.8  F (36.6  C)   Resp 14   Wt 78.9 kg (174 lb)   LMP 12/16/2022   BMI 33.98 kg/m        Intake/Output Summary (Last 24 hours) at 7/5/2023 0441  Last data filed at 7/5/2023 0411  Gross per 24 hour   Intake 3540 ml   Output 3130 ml   Net 410 ml       Gen: NAD   Abdomen: soft, nontender. Light yellow urine in PNT bag  : scant think discharge  SVE: fingertip at external os. Did not attempt to fully reach internal os.     - wet prep -- results all normal  - low concern for labor at this point given exam  - Possible that new pressure is bladder spasm with new bladder filling/change in PNT status. STAT abdominal US to evaluate placement of PNT tubing.   - probiotic ordered per pt request   - will place back on monitor for NST   - Will continue to monitor closely     Melani Lr MD M[facility]  OB/Gyn Resident PGY-3  7/5/2023  4:46 AM

## 2023-07-05 NOTE — PROVIDER NOTIFICATION
07/04/23 2889   Provider Notification   Provider Name/Title Dr Trinh Lr   Method of Notification Electronic Page   Request Evaluate - Remote   Notification Reason Other (Comment)     Pt having contractions 4-11 minutes apart this half hour, palpating mild. Asked if we should consider bolus or oral hydration or other measures.

## 2023-07-05 NOTE — PROVIDER NOTIFICATION
07/05/23 0414   Provider Notification   Provider Name/Title Dr Trinh Lr   Method of Notification Electronic Page   Notification Reason Other (Comment)   Pt c/o contractions that are waking her from sleep. Also has begun urinating 'the regular way' and is concerned that PNT may have become dislodged or displaced.    Addendum: SVE performed, pt fingertip which is stable since last admission. Wet prep sent. Provider to order abdominal ultrasound to examine PNT.

## 2023-07-05 NOTE — PROGRESS NOTES
Therapy: IV abx  [place]    Ded: $3.80 monthly ded    100% coverage for IV abx, however may have a copay per per dispense on the drug through pharmacy plan    In reference to admission on 7/2/23 to check IV abx coverage      Please contact Intake with any questions, [phone] or In Basket pool, [facility] ([phone]).

## 2023-07-05 NOTE — PLAN OF CARE
VSS on room air, afebrile. Denies leaking of fluid, bleeding, headache, vision changes, upper right quadrant pain. Mentioned some loose stool, to begin probiotic this morning. Pt c/o contractions waking her up, also urinating 'the regular way.'  SVE performed by Resident, fingertip, unchanged since last admission. NST at 0500 reactive, 2x 40 second contractions, palpated mild, pt describes as 'tightening'. Taken down for renal ultrasound with doppler this AM, results pending.

## 2023-07-05 NOTE — PROGRESS NOTES
[facility] Antepartum Progress Note    Subjective: Patient feeling well, walking in hallway. No headache, vision changes, abdominal pain, chest pain, shortness of breath, loss of fluid, vaginal bleeding. Feeling + fetal movement. No concerns, ready for discharge.     Objective:  Vitals:    23 0230 23 0520 23 0800 23 0820   BP: 108/58 101/66  99/58   BP Location: Right arm   Right arm   Patient Position: Semi-Carrasco's   Semi-Carrasco's   Cuff Size: Adult Regular   Adult Regular   Pulse:       Resp: 14 16     Temp: 97.8  F (36.6  C) 98  F (36.7  C) 97.6  F (36.4  C) 97.6  F (36.4  C)   TempSrc:  Oral Oral Oral   Weight:           I/O last 3 completed shifts:  In: 3660 [P.O.:3660]  Out: 3580 [Urine:3580]    Gen: Ambulating in hallway, NAD  CV: Well perfused   Resp: Breathing non-labored   Abd: Gravid  Ext: non-tender, no edema    FHT: , moderate variability, +  accels, no decels  [place]: absent contractions     US Renal Transplant  Improvement in previously identified hydronephrosis.  Percutaneous nephrostomy tube in place.     0 Result Notes      Component  4:35 AM   Trichomonas Absent    Yeast Absent    Clue Cells Absent    WBCs/high power field None         Blood Culture (collected ): NGTD    Assessment: Mona Wagner is a 32 year old  @ 28w5d by LMP here for E. Coli pyelonephritis with bacteremia in the setting of renal transplant w/ PNT in place. Patient presenting with one day of subjective fevers at home and outpatient Ucx from PNT w/ e. Coli UTI. Patient febrile to 100.7 w/ WBC of 11.5 and creatinine elevated from baseline at 1.47. Patient otherwise asymptomatic with normal vital signs. Patient admitted for IV antibiotics and blood cultures on first day grew e.coli, treating with 7 days IV ceftriaxone per ID recommendations, arranging outpatient infusion.     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 Coli UTI, c/f pyelonephritis, r/o urosepsis   Fevers  - Follows  with Dr. Mcintyre in transplant nephrology; ID consult: reccommended 1) repeat blood culture today and tomorrow (done) 2) 2g IV ceftriaxone daily 7-10 days, noted that there were not oral options. Urine culture 6/30 significant for e.coli; blood culture positive gram negative rods, planning for 7 days of ceftriaxone, established outpatient infusion appointment at [facility]. D#4 Ceftriaxone   - Continue with Tacrolimus (goal 4-6) and Azathioprine as prescribed by nephrology. Tacro level last 6/29, w/in goal; 7/3 AM Tacro level 4.6  - Nephrostomy tube in place since 6/9, patient urinated via urethra overnight 7/4, US abdomen demonstrated appropriate placement of nephrostomy tube.   - Creatinine trend 1.31 on 6/30 -> 1.28 today  - Tmax/last 100.7/2 on admission, now afebrile   - WBC 11.5. lactate 1.0, stable     Hypothyroidism:  Secondary Hyperparathyroidism with hypercalcemia   - Resection of parathyroid glands on 4/28.   - Follows with endo. Continue current levothyroxine prescription 50 mcg daily.  - 6/16/23 TSH 2.51; today 0.83, within the range of recent TSH measurements for patient in the last 4 months  - Endocrinology next visit 7/28     Hx DVT:  - Assessment for inherited thrombophilias including Factor V Leiden and Prothrombin Gene Mutation:  Negative.  - Additional hypercoagulability work up negative.  - Will consider DVT prophylaxis if admitted for prolonged period of time, likely not needed, plan for patient to discharge today      Hx of bacterial endocarditis:  - Echo on 3/20:  Interpretation Summary  Global and regional left ventricular function is hyperkinetic with an EF >70%.  Right ventricular function, chamber size, wall motion, and thickness are  normal.  Pulmonary artery systolic pressure is normal.  The inferior vena cava cannot be assessed.  No pericardial effusion is present.     FATOUMATA  - Continue PTA CPAP      Routine PNC:  - Prenatal labs:               Rh: +  antibody:  neg               HepB/HIV/RPR/HepC: nonreactive               GC/CT: neg               Rubella: non-immune  Varicella: non-immune               GCT: passed early GCT. Will repeat today               UC: no growth               Pap: NIL and HPV neg  - Immunizations: s/p Flu and Covid. Due for Tdap. Will offer during admission   - Genetic screening: NIPT low-risk. Low-risk NT .      FGR AC 9% with normal UAR:   - Serial growth US q3 weeks, next    - Weekly NST with UAR today, demonstrated normal UA Doppler.     Angeles James, MPH  MS4 [facility]  10:18 AM 2023    Physician Attestation   I, Tiffany Montiel MD, was present with the medical/MAR student who participated in the service and in the documentation of the note.  I have verified the history and personally performed the physical exam and medical decision making.  I agree with the assessment and plan of care as documented in the note.      Key findings: In summary, Mona Wagner is a  at 28w5d admitted with pyelonephritis with secondary bacteremia (E. Coli).  Patient much improved on IV Ceftriaxone, which the E. Coli was sensitive to.  Will plan 7 day IV antibiotic course completed through infusion therapy center.  Both maternal and fetal status are stable and Mona meets criteria for discharge home today.     40 MINUTES SPENT BY ME on the date of service doing chart review, history, exam, documentation & further activities per the note.        Tiffany Montiel MD  Date of Service (when I saw the patient): 23

## 2023-07-05 NOTE — PROGRESS NOTES
General ID Service: Follow Up Note      Patient:  Mona Wagner, Date of birth 1990, Medical record number [id number]  Date of Visit:  2023         Assessment and Recommendations:   ID Problem List:     28w pregnant  Hx of renal transplant c/b hydronephrosis 2/2 ureteral stenosis with nephrostomy tube  Fever and UTI dx at clinic on   -culture growing E coli  Blood cultures with E coli 1/2  Mike with Cr 1.47 on admission, improving with fluids    Recommendations:    -agree with ceftriaxone 2 gm daily for bacteremia for 7 days total through 23  -There are no highly available orals that she can take that would be appropriate for bacteremia  -Agree with secondary pyelonephritis prophylaxis with an oral cephalosporin    Discussion:    Patient who is 28 weeks pregnant with a hx of a renal transplant who presents with E coli UTI and bacteremia. The E coli in her urine has sensitivities which show ceftriaxone is sensitive. Therefore she is on appropriate therapy.      We generally treat GNR bacteremia for 7-10 day however it can be oral after 48 hours if patient is stable and well. This may be a challenge given her bactrim allergy and pregnancy. I discussed with pharmacy and we don't see an oral that would work. Flouroquinolones are not recommended in pregnancy and the augmentin is I for her E coli.  She will need to continue IV ceftriaxone for the duration of her treatment.     The patient would hope to avoid a picc.She will need IV ceftriaxone through 23. I am ok with her going to an infusion center with PIVs to complete the course.      She should have secondary prophylaxis for pyelonephritis with an oral cephalosporin per ACOG guidelines.       We will sign off. Don't hesitate to call with questions.     Attestation:  I have reviewed today's vital signs, medications, labs and imaging.  Delicia Malcolm MD  Pager [phone]      Outpatient Parenteral Antimicrobial Therapy/ID Follow  "up    Infectious Diseases Indication: E coli Bacteremia    Antibiotic Information  Name of Antibiotic Dose of Antibiotic1 Anticipated duration   Ceftriaxone 2 gram  7 days through 7/8/23             1.Dose of antibiotic will need to be renally adjusted if creatinine clearance changes    Method of antibiotic delivery:Peripheral IV.Will the line be used for another indication besides antimicrobials? No At the end of therapy should the line used for antimicrobials be removed or de-accessed? Yes. Selecting \"yes\" will function as written order to remove PICC line or de-access the indwelling line at the end of therapy.    Weekly labs required: none    Imaging for ID follow up: none    All outpatient OPAT starts will be scheduled with Odalys Kapoor NP within 2 weeks of initiation unless otherwise specified. Type of clinic appointment Okay for in person or a virtual visit.  If additional appointments are needed later in the antibiotic course specify the provider Odalys Kapoor NP, timing of visit within 2 weeks of discharge    ID provider route note: OPAT RN Care Coordinator [facility] ID Clinic Information:  Phone: [phone]  Fax: [phone] (Attention ID Clinic Nurses)        Interval History:       Patient feels better. No fevers or chills. No n/v/d. Some concern for abdominal pressure this am.          Review of Systems:   Full 9 pt ROS obtained, pertinent positives and negatives as above.          Current Antimicrobials   Current:    Ceftriaxone 2 gram daily         Physical Exam:   Ranges for vital signs:  Temp:  [97.6  F (36.4  C)-98.5  F (36.9  C)] 97.6  F (36.4  C)  Resp:  [14-16] 16  BP: ()/(53-69) 99/58    Intake/Output Summary (Last 24 hours) at 7/4/2023 1051  Last data filed at 7/4/2023 0749  Gross per 24 hour   Intake 1850 ml   Output 1875 ml   Net -25 ml     Exam:  GENERAL:  well-developed, well-nourished, sitting in bed in no acute distress.   ENT:  " Head is normocephalic, atraumatic. Oropharynx is moist without exudates or ulcers.  EYES:  Eyes have anicteric sclerae.    NECK:  Supple.  LUNGS:  Clear to auscultation.  CARDIOVASCULAR:  Regular rate and rhythm with no murmurs, gallops or rubs.  ABDOMEN:  Normal bowel sounds, soft, nontender.  EXT: Extremities warm and without edema.  SKIN:  No acute rashes.  Line is in place without any surrounding erythema.  NEUROLOGIC:  Grossly nonfocal.         Laboratory Data:   Reviewed.  Pertinent for:    WBC 11.0  Cr 1.28    Culture data:    Collected Updated Procedure Result Status    07/04/2023 0817 07/04/2023 0822 Blood Culture Hand, Right [[id number]]   Blood from Hand, Right    In process Component Value   No component results             07/04/2023 0743 07/04/2023 0822 Blood Culture Arm, Right [[id number]]   Blood from Arm, Right    In process Component Value   No component results             07/03/2023 2057 07/03/2023 2102 Blood Culture Arm, Right [[id number]]   Blood from Arm, Right    In process Component Value   No component results             07/03/2023 1943 07/04/2023 0931 Blood Culture Arm, Right [[id number]]   Blood from Arm, Right    Preliminary result Component Value   Culture No growth after 12 hours P             07/02/2023 1924 07/04/2023 0942 Blood Culture Hand, Right [[id number]]   (Abnormal)   Blood from Hand, Right    Preliminary result Component Value   Culture Positive on the 1st day of incubation Abnormal  P    Escherichia coli Panic  P    1 of 2 bottles             07/02/2023 1924 07/03/2023 2231 Blood Culture Hand, Right [[id number]]   Blood from Hand, Right    Preliminary result Component Value   Culture No growth after 1 day P             07/02/2023 1924 07/03/2023 1234 Verigene GN Panel [[id number]]    (Abnormal)   Blood from Hand, Right    Final result Component Value   Acinetobacter species Not Detected   Citrobacter species Not Detected   Enterobacter species Not Detected    Proteus species Not Detected   Escherichia coli Detected Abnormal    Positive for Escherichia coli by [employer] multiplex nucleic acid test. Final identification and antimicrobial susceptibility testing will be verified by standard methods. Verigene test will not distinguish E. coli from Shigella species including Shigella dysenteriae, Shigella flexneri, Shigella boydii, and Shigella sonnei. Specimens containing Shigella species or E. coli will be reported as positive for E. coli.   Klebsiella pneumoniae Not Detected   Klebsiella oxytoca Not Detected   Pseudomonas aeruginosa Not Detected   CTX-M Not Detected   KPC Not Detected   NDM Not Detected   VIM Not Detected   IMP Not Detected   OXA Not Detected          06/30/2023 1116 07/04/2023 0743 Urine Culture [[id number]]     (Abnormal)   Urine, NOS    Edited Result - FINAL Component Value   Culture >100,000 CFU/mL Escherichia coli Abnormal        Susceptibility     Escherichia coli     LINDA     Ampicillin >=32 ug/mL Resistant     Ampicillin/ Sulbactam 16 ug/mL Intermediate     Cefazolin <=4 ug/mL Susceptible 1     Cefepime <=1 ug/mL Susceptible     Cefoxitin <=4 ug/mL Susceptible     Ceftazidime <=1 ug/mL Susceptible     Ceftriaxone <=1 ug/mL Susceptible     Ciprofloxacin >=4 ug/mL Resistant     Gentamicin <=1 ug/mL Susceptible     Levofloxacin >=8 ug/mL Resistant     Nitrofurantoin <=16 ug/mL Susceptible     Piperacillin/Tazobactam <=4 ug/mL Susceptible     Tobramycin <=1 ug/mL Susceptible     Trimethoprim/Sulfamethoxazole <=1/19 ug/mL Susceptible              1 Cefazolin LINDA breakpoints are for the treatment of uncomplicated urinary tract infections. For the treatment of systemic infections, please contact the laboratory for additional testing.                    Imaging:     Reviewed.

## 2023-07-05 NOTE — PROGRESS NOTES
Patient discharging to home. VSS. Patient denies bleeding, leaking of fluid, or contractions. Pt. will continue abx treatment at home. Patient agreeable to plan. See flow sheet for fetal heart monitoring.

## 2023-07-06 ENCOUNTER — TELEPHONE (OUTPATIENT)
Dept: MATERNAL FETAL MEDICINE | Facility: CLINIC | Age: 33
End: 2023-07-06
Payer: MEDICARE

## 2023-07-06 ENCOUNTER — INFUSION THERAPY VISIT (OUTPATIENT)
Dept: INFUSION THERAPY | Facility: CLINIC | Age: 33
End: 2023-07-06
Attending: OBSTETRICS & GYNECOLOGY
Payer: MEDICARE

## 2023-07-06 ENCOUNTER — TELEPHONE (OUTPATIENT)
Dept: UROLOGY | Facility: CLINIC | Age: 33
End: 2023-07-06
Payer: MEDICARE

## 2023-07-06 VITALS
DIASTOLIC BLOOD PRESSURE: 67 MMHG | OXYGEN SATURATION: 98 % | HEART RATE: 82 BPM | SYSTOLIC BLOOD PRESSURE: 110 MMHG | RESPIRATION RATE: 16 BRPM | TEMPERATURE: 97.9 F

## 2023-07-06 DIAGNOSIS — E86.0 DEHYDRATION: ICD-10-CM

## 2023-07-06 DIAGNOSIS — N18.31 STAGE 3A CHRONIC KIDNEY DISEASE (H): ICD-10-CM

## 2023-07-06 DIAGNOSIS — Z94.0 KIDNEY REPLACED BY TRANSPLANT: ICD-10-CM

## 2023-07-06 DIAGNOSIS — N18.31 ANEMIA OF CHRONIC RENAL FAILURE, STAGE 3A (H): ICD-10-CM

## 2023-07-06 DIAGNOSIS — D63.1 ANEMIA OF CHRONIC RENAL FAILURE, STAGE 3A (H): ICD-10-CM

## 2023-07-06 DIAGNOSIS — Z48.298 AFTERCARE FOLLOWING ORGAN TRANSPLANT: ICD-10-CM

## 2023-07-06 DIAGNOSIS — R78.81 BACTEREMIA: Primary | ICD-10-CM

## 2023-07-06 LAB
ENTEROCOCCUS FAECALIS: NOT DETECTED
ENTEROCOCCUS FAECIUM: NOT DETECTED
LISTERIA SPECIES (DETECTED/NOT DETECTED): NOT DETECTED
STAPHYLOCOCCUS AUREUS: NOT DETECTED
STAPHYLOCOCCUS EPIDERMIDIS: NOT DETECTED
STAPHYLOCOCCUS LUGDUNENSIS: NOT DETECTED
STAPHYLOCOCCUS SPECIES: DETECTED
STREPTOCOCCUS AGALACTIAE: NOT DETECTED
STREPTOCOCCUS ANGINOSUS GROUP: NOT DETECTED
STREPTOCOCCUS PNEUMONIAE: NOT DETECTED
STREPTOCOCCUS PYOGENES: NOT DETECTED
STREPTOCOCCUS SPECIES: NOT DETECTED

## 2023-07-06 PROCEDURE — 36415 COLL VENOUS BLD VENIPUNCTURE: CPT

## 2023-07-06 PROCEDURE — 999N000285 HC STATISTIC VASC ACCESS LAB DRAW WITH PIV START

## 2023-07-06 PROCEDURE — 250N000011 HC RX IP 250 OP 636: Mod: JZ | Performed by: OBSTETRICS & GYNECOLOGY

## 2023-07-06 PROCEDURE — 96372 THER/PROPH/DIAG INJ SC/IM: CPT | Performed by: INTERNAL MEDICINE

## 2023-07-06 PROCEDURE — 87040 BLOOD CULTURE FOR BACTERIA: CPT | Performed by: OBSTETRICS & GYNECOLOGY

## 2023-07-06 PROCEDURE — 250N000009 HC RX 250: Performed by: OBSTETRICS & GYNECOLOGY

## 2023-07-06 PROCEDURE — 96374 THER/PROPH/DIAG INJ IV PUSH: CPT

## 2023-07-06 PROCEDURE — 999N000127 HC STATISTIC PERIPHERAL IV START W US GUIDANCE

## 2023-07-06 PROCEDURE — 250N000011 HC RX IP 250 OP 636: Mod: JZ | Performed by: INTERNAL MEDICINE

## 2023-07-06 RX ORDER — CEFTRIAXONE SODIUM 2 G
2 VIAL (EA) INJECTION ONCE
Status: COMPLETED | OUTPATIENT
Start: 2023-07-06 | End: 2023-07-06

## 2023-07-06 RX ORDER — ALBUTEROL SULFATE 0.83 MG/ML
2.5 SOLUTION RESPIRATORY (INHALATION)
Status: CANCELLED | OUTPATIENT
Start: 2023-07-07

## 2023-07-06 RX ORDER — EPINEPHRINE 1 MG/ML
0.3 INJECTION, SOLUTION INTRAMUSCULAR; SUBCUTANEOUS EVERY 5 MIN PRN
Status: CANCELLED | OUTPATIENT
Start: 2023-07-06

## 2023-07-06 RX ORDER — ALBUTEROL SULFATE 0.83 MG/ML
2.5 SOLUTION RESPIRATORY (INHALATION)
Status: CANCELLED | OUTPATIENT
Start: 2023-07-06

## 2023-07-06 RX ORDER — CEFTRIAXONE SODIUM 2 G
2 VIAL (EA) INJECTION ONCE
Status: CANCELLED
Start: 2023-07-08 | End: 2023-07-08

## 2023-07-06 RX ORDER — MEPERIDINE HYDROCHLORIDE 25 MG/ML
25 INJECTION INTRAMUSCULAR; INTRAVENOUS; SUBCUTANEOUS EVERY 30 MIN PRN
Status: CANCELLED | OUTPATIENT
Start: 2023-07-06

## 2023-07-06 RX ORDER — EPINEPHRINE 1 MG/ML
0.3 INJECTION, SOLUTION INTRAMUSCULAR; SUBCUTANEOUS EVERY 5 MIN PRN
Status: CANCELLED | OUTPATIENT
Start: 2023-07-07

## 2023-07-06 RX ORDER — DIPHENHYDRAMINE HYDROCHLORIDE 50 MG/ML
50 INJECTION INTRAMUSCULAR; INTRAVENOUS
Status: CANCELLED
Start: 2023-07-06

## 2023-07-06 RX ORDER — HEPARIN SODIUM,PORCINE 10 UNIT/ML
5-20 VIAL (ML) INTRAVENOUS DAILY PRN
Status: CANCELLED | OUTPATIENT
Start: 2023-07-07

## 2023-07-06 RX ORDER — ALBUTEROL SULFATE 90 UG/1
1-2 AEROSOL, METERED RESPIRATORY (INHALATION)
Status: CANCELLED
Start: 2023-07-07

## 2023-07-06 RX ORDER — ALBUTEROL SULFATE 90 UG/1
1-2 AEROSOL, METERED RESPIRATORY (INHALATION)
Status: CANCELLED
Start: 2023-07-06

## 2023-07-06 RX ORDER — METHYLPREDNISOLONE SODIUM SUCCINATE 125 MG/2ML
125 INJECTION, POWDER, LYOPHILIZED, FOR SOLUTION INTRAMUSCULAR; INTRAVENOUS
Status: CANCELLED
Start: 2023-07-06

## 2023-07-06 RX ORDER — METHYLPREDNISOLONE SODIUM SUCCINATE 125 MG/2ML
125 INJECTION, POWDER, LYOPHILIZED, FOR SOLUTION INTRAMUSCULAR; INTRAVENOUS
Status: CANCELLED
Start: 2023-07-07

## 2023-07-06 RX ORDER — DIPHENHYDRAMINE HYDROCHLORIDE 50 MG/ML
50 INJECTION INTRAMUSCULAR; INTRAVENOUS
Status: CANCELLED
Start: 2023-07-07

## 2023-07-06 RX ORDER — HEPARIN SODIUM (PORCINE) LOCK FLUSH IV SOLN 100 UNIT/ML 100 UNIT/ML
5 SOLUTION INTRAVENOUS
Status: CANCELLED | OUTPATIENT
Start: 2023-07-07

## 2023-07-06 RX ADMIN — CEFTRIAXONE SODIUM 2 G: 2 INJECTION, POWDER, FOR SOLUTION INTRAMUSCULAR; INTRAVENOUS at 14:45

## 2023-07-06 RX ADMIN — DARBEPOETIN ALFA 40 MCG: 40 INJECTION, SOLUTION INTRAVENOUS; SUBCUTANEOUS at 14:21

## 2023-07-06 NOTE — TELEPHONE ENCOUNTER
I called Mona to review her blood culture results from 7/4/2023, which demonstrated the following results:     Positive on the 2nd day of incubation Abnormal        Gram positive cocci in clusters Panic          Staphylococcus species Not Detected Detected Abnormal      Comment: Positive for coagulase-negative Staphylococci, other than Staphylococcus epidermidis and Staphylococcus lugdunensis, by [employer] multiplex nucleic acid test. Coagulase-negative Staphylococci are the most common venipuncture or collection associated skin contaminants grown in blood cultures. Final identification and antimicrobial susceptibility testing will be verified by standard methods.    Staphylococcus aureus Not Detected Not Detected     Staphylococcus epidermidis Not Detected Not Detected     Staphylococcus lugdunensis Not Detected Not Detected     Enterococcus faecalis Not Detected Not Detected     Enterococcus faecium Not Detected Not Detected     Streptococcus species Not Detected Not Detected     Streptococcus agalactiae Not Detected Not Detected     Streptococcus anginosus group Not Detected Not Detected     Streptococcus pneumoniae Not Detected Not Detected     Streptococcus pyogenes Not Detected Not Detected     Listeria species Not Detected Not Detected      Mona also had blood cultures on 7/3/2023 that are NGTD x 2.  The second blood culture from 7/4/2023 is NGTD.  I reviewed this finding with Dr. Arana (covering ID today) - these blood cultures were to assess presence of E. Coli in blood, which was not seen in blood cultures from7/3 or 7/4; as the staph species was seen in only 1 of 6 blood cultures, this most likely represents a contaminant; recommended to continue on current antibiotic regimen, no need to expand coverage and Mona is clinically improved and no need to extend course of antibiotics as long as Mona remains asymptomatic and recommend repeat one more set of blood cultures today prior to antibiotic  administration in the infusion therapy center.    I reviewed this information and plan of care with Mona expressed understanding.  I have asked her to closely monitor her symptoms, check her temperature twice a day and prn symptoms as well and she will call us with any questions or concerns.    Tiffany Montiel MD

## 2023-07-06 NOTE — TELEPHONE ENCOUNTER
MEDICAL RECORDS REQUEST   [facility]  Urology Clinic  [address] [place] [address]  PHONE: [phone]  Fax: [phone]        FUTURE VISIT INFORMATION                                                   Mona Wagner : 1990 scheduled for future visit at [facility]    APPOINTMENT INFORMATION:    Date: 2023    Provider:  Wally Britt MD    Reason for Visit/Diagnosis: transplanted kidney and hydronephrosis during pregnancy    REFERRAL INFORMATION:    Referring provider:  Gelacio Mcintyre MD in  [facility]    RECORDS REQUESTED FOR VISIT                                                     NOTES  STATUS/DETAILS   OFFICE NOTE from other specialist  yes, 2023 -- Nancy Camp MD @ [facility]  2023 -- Nakul Hill MD @ [facility]   MEDICATION LIST  yes   LABS     URINALYSIS (UA)  yes   images  yes, 2023, 2023, 2023, 2023 -- US RENAL  2023 -- IR NEPHROSTOMY  2023 -- CT ABD PELVIS     PRE-VISIT CHECKLIST      Joint diagnostic appointment coordinated correctly          (ensure right order & amount of time) Yes   RECORD COLLECTION COMPLETE Yes

## 2023-07-06 NOTE — TELEPHONE ENCOUNTER
Pt arrived to clinic requesting PNT drainage bag. Pt states supplies are not covered by her insurance.     Supplies given.     Pt last seen 2021, appointment scheduled with Dr. Britt 7/28/23.    Gely Nair CMA  07/06/23  3:39 PM

## 2023-07-06 NOTE — PROGRESS NOTES
Nursing Note  Mona Wagner [facility] for:   Chief Complaint   Patient presents with     Infusion     Rocephin & Aranesp       During today's [facility] appointment, orders from Dr. Montiel were completed.  Frequency: daily     Progress note:  Patient identification verified by name and date of birth.  Assessment completed.  Vitals recorded in Doc Flowsheets.  Patient was provided with education regarding medication/procedure and possible side effects.  Patient verbalized understanding.     present during visit today: Not Applicable.    Treatment Conditions: Non-applicable.    Premedications: were not ordered.    Drug Waste Record: No    Infusion length and rate:  infusion given over approximately 3 minutes    Labs: were drawn per orders.     Vascular access: peripheral IV was placed prior to appointment on 7/5/23  Is the next appt scheduled? yes    Post Infusion Assessment:  Patient tolerated infusion without incident.     Discharge Plan:   Follow up plan of care with: ongoing infusions at [facility]. and ordering provider as scheduled.  Discharge instructions were reviewed with patient.  Patient/representative verbalized understanding of discharge instructions and all questions answered.  Patient discharged from [facility] in stable condition.    Alba Naylor RN       Administrations This Visit     cefTRIAXone (ROCEPHIN) 2 g in 20 mL SWFI for IVP     Admin Date  07/06/2023 Action  $Given Dose  2 g Route  Intravenous Administered By  Alba Naylor RN          darbepoetin matt-polysorbate (ARANESP) injection 40 mcg     Admin Date  07/06/2023 Action  $Given Dose  40 mcg Route  Subcutaneous Administered By  Alba Naylor, ERMELINDA              /67 (BP Location: Right arm, Patient Position: Semi-Carrasco's, Cuff Size: Adult Regular)   Pulse 82   Temp 97.9  F (36.6  C)  (Oral)   Resp 16   LMP 12/16/2022   SpO2 98%

## 2023-07-07 ENCOUNTER — INFUSION THERAPY VISIT (OUTPATIENT)
Dept: INFUSION THERAPY | Facility: CLINIC | Age: 33
End: 2023-07-07
Attending: OBSTETRICS & GYNECOLOGY
Payer: MEDICARE

## 2023-07-07 VITALS
SYSTOLIC BLOOD PRESSURE: 133 MMHG | DIASTOLIC BLOOD PRESSURE: 82 MMHG | TEMPERATURE: 97.8 F | OXYGEN SATURATION: 100 % | HEART RATE: 80 BPM

## 2023-07-07 DIAGNOSIS — R78.81 BACTEREMIA: Primary | ICD-10-CM

## 2023-07-07 DIAGNOSIS — E86.0 DEHYDRATION: ICD-10-CM

## 2023-07-07 DIAGNOSIS — N18.31 ANEMIA OF CHRONIC RENAL FAILURE, STAGE 3A (H): ICD-10-CM

## 2023-07-07 DIAGNOSIS — D63.1 ANEMIA OF CHRONIC RENAL FAILURE, STAGE 3A (H): ICD-10-CM

## 2023-07-07 DIAGNOSIS — Z94.0 KIDNEY REPLACED BY TRANSPLANT: ICD-10-CM

## 2023-07-07 DIAGNOSIS — E03.9 ACQUIRED HYPOTHYROIDISM: ICD-10-CM

## 2023-07-07 DIAGNOSIS — Z48.298 AFTERCARE FOLLOWING ORGAN TRANSPLANT: ICD-10-CM

## 2023-07-07 DIAGNOSIS — N18.31 STAGE 3A CHRONIC KIDNEY DISEASE (H): ICD-10-CM

## 2023-07-07 LAB
ALBUMIN MFR UR ELPH: <6 MG/DL
ANION GAP SERPL CALCULATED.3IONS-SCNC: 10 MMOL/L (ref 7–15)
BACTERIA BLD CULT: NO GROWTH
BASOPHILS # BLD AUTO: 0 10E3/UL (ref 0–0.2)
BASOPHILS NFR BLD AUTO: 0 %
BUN SERPL-MCNC: 25.3 MG/DL (ref 6–20)
CALCIUM SERPL-MCNC: 9.5 MG/DL (ref 8.6–10)
CHLORIDE SERPL-SCNC: 107 MMOL/L (ref 98–107)
CREAT SERPL-MCNC: 1.36 MG/DL (ref 0.51–0.95)
CREAT UR-MCNC: 17.6 MG/DL
DEPRECATED HCO3 PLAS-SCNC: 19 MMOL/L (ref 22–29)
EOSINOPHIL # BLD AUTO: 0.2 10E3/UL (ref 0–0.7)
EOSINOPHIL NFR BLD AUTO: 3 %
ERYTHROCYTE [DISTWIDTH] IN BLOOD BY AUTOMATED COUNT: 13.8 % (ref 10–15)
GFR SERPL CREATININE-BSD FRML MDRD: 53 ML/MIN/1.73M2
GLUCOSE SERPL-MCNC: 87 MG/DL (ref 70–99)
HCT VFR BLD AUTO: 25.7 % (ref 35–47)
HGB BLD-MCNC: 8.3 G/DL (ref 11.7–15.7)
IMM GRANULOCYTES # BLD: 0.1 10E3/UL
IMM GRANULOCYTES NFR BLD: 2 %
LYMPHOCYTES # BLD AUTO: 0.6 10E3/UL (ref 0.8–5.3)
LYMPHOCYTES NFR BLD AUTO: 8 %
MCH RBC QN AUTO: 30.7 PG (ref 26.5–33)
MCHC RBC AUTO-ENTMCNC: 32.3 G/DL (ref 31.5–36.5)
MCV RBC AUTO: 95 FL (ref 78–100)
MONOCYTES # BLD AUTO: 0.5 10E3/UL (ref 0–1.3)
MONOCYTES NFR BLD AUTO: 6 %
NEUTROPHILS # BLD AUTO: 5.9 10E3/UL (ref 1.6–8.3)
NEUTROPHILS NFR BLD AUTO: 81 %
NRBC # BLD AUTO: 0 10E3/UL
NRBC BLD AUTO-RTO: 0 /100
PLATELET # BLD AUTO: 249 10E3/UL (ref 150–450)
POTASSIUM SERPL-SCNC: 5.1 MMOL/L (ref 3.4–5.3)
PROT/CREAT 24H UR: NORMAL MG/G{CREAT}
RBC # BLD AUTO: 2.7 10E6/UL (ref 3.8–5.2)
SODIUM SERPL-SCNC: 136 MMOL/L (ref 136–145)
T3FREE SERPL-MCNC: 2 PG/ML (ref 2–4.4)
T4 FREE SERPL-MCNC: 1.14 NG/DL (ref 0.9–1.7)
TACROLIMUS BLD-MCNC: 5.3 UG/L (ref 5–15)
TME LAST DOSE: NORMAL H
TME LAST DOSE: NORMAL H
TSH SERPL DL<=0.005 MIU/L-ACNC: 1.32 UIU/ML (ref 0.3–4.2)
WBC # BLD AUTO: 7.3 10E3/UL (ref 4–11)

## 2023-07-07 PROCEDURE — 36415 COLL VENOUS BLD VENIPUNCTURE: CPT

## 2023-07-07 PROCEDURE — 85014 HEMATOCRIT: CPT

## 2023-07-07 PROCEDURE — 258N000003 HC RX IP 258 OP 636: Performed by: OBSTETRICS & GYNECOLOGY

## 2023-07-07 PROCEDURE — 84156 ASSAY OF PROTEIN URINE: CPT

## 2023-07-07 PROCEDURE — 84481 FREE ASSAY (FT-3): CPT

## 2023-07-07 PROCEDURE — 250N000011 HC RX IP 250 OP 636: Mod: JZ | Performed by: OBSTETRICS & GYNECOLOGY

## 2023-07-07 PROCEDURE — 80197 ASSAY OF TACROLIMUS: CPT

## 2023-07-07 PROCEDURE — 84439 ASSAY OF FREE THYROXINE: CPT

## 2023-07-07 PROCEDURE — 96365 THER/PROPH/DIAG IV INF INIT: CPT

## 2023-07-07 PROCEDURE — 80048 BASIC METABOLIC PNL TOTAL CA: CPT

## 2023-07-07 PROCEDURE — 84443 ASSAY THYROID STIM HORMONE: CPT

## 2023-07-07 RX ORDER — ALBUTEROL SULFATE 0.83 MG/ML
2.5 SOLUTION RESPIRATORY (INHALATION)
Status: CANCELLED | OUTPATIENT
Start: 2023-07-08

## 2023-07-07 RX ORDER — ALBUTEROL SULFATE 90 UG/1
1-2 AEROSOL, METERED RESPIRATORY (INHALATION)
Status: CANCELLED
Start: 2023-07-08

## 2023-07-07 RX ORDER — METHYLPREDNISOLONE SODIUM SUCCINATE 125 MG/2ML
125 INJECTION, POWDER, LYOPHILIZED, FOR SOLUTION INTRAMUSCULAR; INTRAVENOUS
Status: CANCELLED
Start: 2023-07-08

## 2023-07-07 RX ORDER — DIPHENHYDRAMINE HYDROCHLORIDE 50 MG/ML
50 INJECTION INTRAMUSCULAR; INTRAVENOUS
Status: CANCELLED
Start: 2023-07-08

## 2023-07-07 RX ORDER — EPINEPHRINE 1 MG/ML
0.3 INJECTION, SOLUTION INTRAMUSCULAR; SUBCUTANEOUS EVERY 5 MIN PRN
Status: CANCELLED | OUTPATIENT
Start: 2023-07-08

## 2023-07-07 RX ORDER — CEFTRIAXONE SODIUM 2 G
2 VIAL (EA) INJECTION ONCE
Status: CANCELLED
Start: 2023-07-09 | End: 2023-07-09

## 2023-07-07 RX ORDER — HEPARIN SODIUM,PORCINE 10 UNIT/ML
5-20 VIAL (ML) INTRAVENOUS DAILY PRN
Status: CANCELLED | OUTPATIENT
Start: 2023-07-08

## 2023-07-07 RX ORDER — HEPARIN SODIUM (PORCINE) LOCK FLUSH IV SOLN 100 UNIT/ML 100 UNIT/ML
5 SOLUTION INTRAVENOUS
Status: CANCELLED | OUTPATIENT
Start: 2023-07-08

## 2023-07-07 RX ADMIN — CEFTRIAXONE SODIUM 2 G: 2 INJECTION, POWDER, FOR SOLUTION INTRAMUSCULAR; INTRAVENOUS at 10:16

## 2023-07-07 NOTE — PROGRESS NOTES
Nursing Note  Mona Wagner presents today to [facility] for:   Chief Complaint   Patient presents with     Infusion     IV antibiotics     During today's [facility] appointment, orders from VINAYAK ZUNIGA MD were completed.  Frequency: today is dose 2 of 3 total    Progress note:  Patient identification verified by name and date of birth.  Assessment completed.  Vitals recorded in Doc Flowsheets.  Patient was provided with education regarding medication/procedure and possible side effects.  Patient verbalized understanding.     present during visit today: Not Applicable.    Treatment Conditions: Patient using PIV placed by vascular access 7/6/23, provider TORB to use PIV for three days, vascular used butterfly 7/7/23 to get labs  Premedications: were not ordered.  Drug Waste Record: No  Infusion length and rate:  250 ml/hr., over 30 minutes, patient cannot tolerate IVP antibiotics, had itching episode after receiving 7/6/23 dose in the middle of the night, had to take benadryl and hydroxyzine  Labs: were drawn per orders, vascular nurse used US and butterfly  Vascular access: peripheral IV was placed by vascular access nurse. and peripheral IV left in place for next appoinment.  Is the next appt scheduled? yes    Post Infusion Assessment:  Patient tolerated infusion without incident.  Site patent and intact, free from redness, edema or discomfort.  No evidence of extravasations.  Access discontinued per protocol.     Discharge Plan:   Follow up plan of care with: ongoing infusions at [facility].  Discharge instructions were reviewed with patient.  Patient/representative verbalized understanding of discharge instructions and all questions answered.  Patient discharged from [facility] in stable condition.    Cris Payan RN    Administrations This Visit     cefTRIAXone (ROCEPHIN) 2 g in  sodium chloride 0.9 % 100 mL intermittent infusion     Admin Date  07/07/2023 Action  $New Bag Dose  2 g Rate  260 mL/hr Route  Intravenous Administered By  Cris Payan RN                /82   Pulse 80   Temp 97.8  F (36.6  C) (Oral)   LMP 12/16/2022   SpO2 100%

## 2023-07-07 NOTE — LETTER
July 11, 2023      Mona Wagner  [address]   [place] [address]        Dear Mona    Here are your labs which show a normal thyroid function and normal TSH.  No changes to your current thyroid program.  Please recheck again in 1 month.    If you have any questions, please feel free to contact my nurse at [phone] select option #3 for triage nurse  or  option #1 for scheduling related questions.    Regards    Katrin Lopez MD     Resulted Orders   T4 free   Result Value Ref Range    Free T4 1.14 0.90 - 1.70 ng/dL   T3 Free   Result Value Ref Range    T3 Free 2.0 2.0 - 4.4 pg/mL   TSH   Result Value Ref Range    TSH 1.32 0.30 - 4.20 uIU/mL

## 2023-07-07 NOTE — PATIENT INSTRUCTIONS
Dear Mona Wagner    Thank you for choosing [facility] ([facility]) for your infusion.  The following information is a summary of our appointment as well as important reminders.      We look forward in seeing you on your next appointment here at [facility] ([facility]).  Please don t hesitate to call us at [phone] to reschedule any of your appointments or to speak with one of the [facility] registered nurses.  It was a pleasure taking care of you today.    Sincerely,    [facility]  [facility]  [address] [place] [address]  [address]  Phone:  [phone]

## 2023-07-08 ENCOUNTER — INFUSION THERAPY VISIT (OUTPATIENT)
Dept: INFUSION THERAPY | Facility: CLINIC | Age: 33
End: 2023-07-08
Attending: OBSTETRICS & GYNECOLOGY
Payer: MEDICARE

## 2023-07-08 VITALS
OXYGEN SATURATION: 98 % | HEART RATE: 98 BPM | TEMPERATURE: 97.4 F | DIASTOLIC BLOOD PRESSURE: 72 MMHG | SYSTOLIC BLOOD PRESSURE: 117 MMHG | RESPIRATION RATE: 16 BRPM

## 2023-07-08 DIAGNOSIS — Z94.0 KIDNEY REPLACED BY TRANSPLANT: ICD-10-CM

## 2023-07-08 DIAGNOSIS — E86.0 DEHYDRATION: ICD-10-CM

## 2023-07-08 DIAGNOSIS — R78.81 BACTEREMIA: Primary | ICD-10-CM

## 2023-07-08 DIAGNOSIS — Z48.298 AFTERCARE FOLLOWING ORGAN TRANSPLANT: ICD-10-CM

## 2023-07-08 LAB — BACTERIA BLD CULT: NO GROWTH

## 2023-07-08 PROCEDURE — 96365 THER/PROPH/DIAG IV INF INIT: CPT

## 2023-07-08 PROCEDURE — 250N000011 HC RX IP 250 OP 636: Mod: JZ | Performed by: OBSTETRICS & GYNECOLOGY

## 2023-07-08 PROCEDURE — 258N000003 HC RX IP 258 OP 636: Performed by: OBSTETRICS & GYNECOLOGY

## 2023-07-08 RX ORDER — ALBUTEROL SULFATE 0.83 MG/ML
2.5 SOLUTION RESPIRATORY (INHALATION)
Status: CANCELLED | OUTPATIENT
Start: 2023-07-08

## 2023-07-08 RX ORDER — CEFTRIAXONE SODIUM 2 G
2 VIAL (EA) INJECTION ONCE
Status: CANCELLED
Start: 2023-07-09 | End: 2023-07-09

## 2023-07-08 RX ORDER — DIPHENHYDRAMINE HYDROCHLORIDE 50 MG/ML
50 INJECTION INTRAMUSCULAR; INTRAVENOUS
Status: CANCELLED
Start: 2023-07-08

## 2023-07-08 RX ORDER — METHYLPREDNISOLONE SODIUM SUCCINATE 125 MG/2ML
125 INJECTION, POWDER, LYOPHILIZED, FOR SOLUTION INTRAMUSCULAR; INTRAVENOUS
Status: CANCELLED
Start: 2023-07-08

## 2023-07-08 RX ORDER — HEPARIN SODIUM,PORCINE 10 UNIT/ML
5-20 VIAL (ML) INTRAVENOUS DAILY PRN
Status: CANCELLED | OUTPATIENT
Start: 2023-07-08

## 2023-07-08 RX ORDER — EPINEPHRINE 1 MG/ML
0.3 INJECTION, SOLUTION INTRAMUSCULAR; SUBCUTANEOUS EVERY 5 MIN PRN
Status: CANCELLED | OUTPATIENT
Start: 2023-07-08

## 2023-07-08 RX ORDER — CEFTRIAXONE SODIUM 2 G
2 VIAL (EA) INJECTION ONCE
Status: DISCONTINUED | OUTPATIENT
Start: 2023-07-08 | End: 2023-07-08

## 2023-07-08 RX ORDER — HEPARIN SODIUM (PORCINE) LOCK FLUSH IV SOLN 100 UNIT/ML 100 UNIT/ML
5 SOLUTION INTRAVENOUS
Status: CANCELLED | OUTPATIENT
Start: 2023-07-08

## 2023-07-08 RX ORDER — ALBUTEROL SULFATE 90 UG/1
1-2 AEROSOL, METERED RESPIRATORY (INHALATION)
Status: CANCELLED
Start: 2023-07-08

## 2023-07-08 RX ADMIN — CEFTRIAXONE SODIUM 2 G: 2 INJECTION, POWDER, FOR SOLUTION INTRAMUSCULAR; INTRAVENOUS at 14:21

## 2023-07-08 NOTE — PATIENT INSTRUCTIONS
Dear Mona Wagner    Thank you for choosing [facility] ([facility]) for your infusion.  The following information is a summary of our appointment as well as important reminders.      We look forward in seeing you on your next appointment here at [facility] ([facility]).  Please don t hesitate to call us at [phone] to reschedule any of your appointments or to speak with one of the [facility] registered nurses.  It was a pleasure taking care of you today.    Sincerely,    [facility]  [facility]  [address] [place] [address]  [address]  Phone:  [phone]

## 2023-07-08 NOTE — PROGRESS NOTES
Nursing Note  Mona Wagner presents today to [facility] for:   Chief Complaint   Patient presents with     Infusion     During today's [facility] appointment, orders from VINAYAK ZUNIGA MD were completed.  Frequency: today is dose 3 of 3 total    Progress note:  Patient identification verified by name and date of birth.  Assessment completed.  Vitals recorded in Doc Flowsheets.  Patient was provided with education regarding medication/procedure and possible side effects.  Patient verbalized understanding.      Premedications: were not ordered.  Drug Waste Record: No  Infusion length and rate: over 30 minutes,   Labs: were no ordered  Vascular access: peripheral IV was placed by vascular access nurse. and peripheral IV left in place  Is the next appt scheduled? no    Post Infusion Assessment:  Patient tolerated infusion without incident.  Site patent and intact, free from redness, edema or discomfort.  No evidence of extravasations.  Access discontinued per protocol.     Discharge Plan:   Follow up plan of care with: ongoing infusions at [facility].  Discharge instructions were reviewed with patient.  Patient/representative verbalized understanding of discharge instructions and all questions answered.  Patient discharged from [facility] in stable condition.    Prachi Britt RN       Administrations This Visit     cefTRIAXone (ROCEPHIN) 2 g in sodium chloride 0.9 % 100 mL intermittent infusion     Admin Date  07/08/2023 Action  $New Bag Dose  2 g Rate  200 mL/hr Route  Intravenous Administered By  Prachi Britt RN                  /72 (BP Location: Right arm, Patient Position: Semi-Carrasco's, Cuff Size: Adult Regular)   Pulse 98   Temp 97.4  F (36.3  C) (Oral)   Resp 16   LMP 12/16/2022   SpO2 98%

## 2023-07-09 ENCOUNTER — HEALTH MAINTENANCE LETTER (OUTPATIENT)
Age: 33
End: 2023-07-09

## 2023-07-09 LAB
BACTERIA BLD CULT: NO GROWTH
BACTERIA BLD CULT: NO GROWTH

## 2023-07-10 ENCOUNTER — PRE VISIT (OUTPATIENT)
Dept: UROLOGY | Facility: CLINIC | Age: 33
End: 2023-07-10

## 2023-07-10 ENCOUNTER — TELEPHONE (OUTPATIENT)
Dept: MATERNAL FETAL MEDICINE | Facility: CLINIC | Age: 33
End: 2023-07-10
Payer: MEDICARE

## 2023-07-10 ENCOUNTER — LAB (OUTPATIENT)
Dept: LAB | Facility: HOSPITAL | Age: 33
End: 2023-07-10
Payer: MEDICARE

## 2023-07-10 DIAGNOSIS — Z94.0 KIDNEY REPLACED BY TRANSPLANT: ICD-10-CM

## 2023-07-10 DIAGNOSIS — O09.92 HIGH-RISK PREGNANCY IN SECOND TRIMESTER: Primary | ICD-10-CM

## 2023-07-10 DIAGNOSIS — O09.92 HIGH-RISK PREGNANCY IN SECOND TRIMESTER: ICD-10-CM

## 2023-07-10 DIAGNOSIS — E58 CALCIUM DEFICIENCY: ICD-10-CM

## 2023-07-10 DIAGNOSIS — Z48.298 AFTERCARE FOLLOWING ORGAN TRANSPLANT: ICD-10-CM

## 2023-07-10 LAB
ALBUMIN MFR UR ELPH: 9.3 MG/DL
ALBUMIN SERPL BCG-MCNC: 3.6 G/DL (ref 3.5–5.2)
ALP SERPL-CCNC: 117 U/L (ref 35–104)
ALT SERPL W P-5'-P-CCNC: 13 U/L (ref 0–50)
ANION GAP SERPL CALCULATED.3IONS-SCNC: 10 MMOL/L (ref 7–15)
AST SERPL W P-5'-P-CCNC: 21 U/L (ref 0–45)
BILIRUB SERPL-MCNC: 0.6 MG/DL
BUN SERPL-MCNC: 27.1 MG/DL (ref 6–20)
CALCIUM SERPL-MCNC: 9.3 MG/DL (ref 8.6–10)
CALCIUM, IONIZED MEASURED: 1.2 MMOL/L (ref 1.11–1.3)
CHLORIDE SERPL-SCNC: 105 MMOL/L (ref 98–107)
CREAT SERPL-MCNC: 1.49 MG/DL (ref 0.51–0.95)
CREAT UR-MCNC: 37.2 MG/DL
DEPRECATED HCO3 PLAS-SCNC: 20 MMOL/L (ref 22–29)
ERYTHROCYTE [DISTWIDTH] IN BLOOD BY AUTOMATED COUNT: 13.8 % (ref 10–15)
GFR SERPL CREATININE-BSD FRML MDRD: 48 ML/MIN/1.73M2
GLUCOSE SERPL-MCNC: 84 MG/DL (ref 70–99)
HCT VFR BLD AUTO: 28.5 % (ref 35–47)
HGB BLD-MCNC: 9.1 G/DL (ref 11.7–15.7)
ION CA PH 7.4: 1.18 MMOL/L (ref 1.11–1.3)
MCH RBC QN AUTO: 30.5 PG (ref 26.5–33)
MCHC RBC AUTO-ENTMCNC: 31.9 G/DL (ref 31.5–36.5)
MCV RBC AUTO: 96 FL (ref 78–100)
PH: 7.36 (ref 7.35–7.45)
PLATELET # BLD AUTO: 305 10E3/UL (ref 150–450)
POTASSIUM SERPL-SCNC: 5.5 MMOL/L (ref 3.4–5.3)
PROT SERPL-MCNC: 7.5 G/DL (ref 6.4–8.3)
PROT/CREAT 24H UR: 0.25 MG/MG CR (ref 0–0.2)
RBC # BLD AUTO: 2.98 10E6/UL (ref 3.8–5.2)
SODIUM SERPL-SCNC: 135 MMOL/L (ref 136–145)
TACROLIMUS BLD-MCNC: 8.3 UG/L (ref 5–15)
TME LAST DOSE: NORMAL H
TME LAST DOSE: NORMAL H
WBC # BLD AUTO: 6.8 10E3/UL (ref 4–11)

## 2023-07-10 PROCEDURE — 84156 ASSAY OF PROTEIN URINE: CPT

## 2023-07-10 PROCEDURE — 80197 ASSAY OF TACROLIMUS: CPT

## 2023-07-10 PROCEDURE — 82330 ASSAY OF CALCIUM: CPT

## 2023-07-10 PROCEDURE — 36415 COLL VENOUS BLD VENIPUNCTURE: CPT

## 2023-07-10 PROCEDURE — 82239 BILE ACIDS TOTAL: CPT

## 2023-07-10 PROCEDURE — 85027 COMPLETE CBC AUTOMATED: CPT

## 2023-07-10 PROCEDURE — 80053 COMPREHEN METABOLIC PANEL: CPT

## 2023-07-10 NOTE — TELEPHONE ENCOUNTER
Reason for Visit: Follow-up to discuss transplanted kidney and hydronephrosis during pregnancy    Diagnosis: Stricture or kinking of ureter    Rooming Requirements: Normal      Elycia Rajinder  07/10/23  3:12 PM

## 2023-07-10 NOTE — TELEPHONE ENCOUNTER
Mona called in stating the past few days, she has had itchy hands and bottom of feet as well as the back of her torso from midnight to 6 am each day. Pt informed we would put orders in for bile acids and CMP. Pt informed to use hydrocortisone cream for itching for now. Denies further needs or concerns.    Ela Kraft RN

## 2023-07-11 ENCOUNTER — LAB (OUTPATIENT)
Dept: LAB | Facility: HOSPITAL | Age: 33
End: 2023-07-11
Payer: MEDICARE

## 2023-07-11 ENCOUNTER — TELEPHONE (OUTPATIENT)
Dept: ENDOCRINOLOGY | Facility: CLINIC | Age: 33
End: 2023-07-11

## 2023-07-11 ENCOUNTER — TELEPHONE (OUTPATIENT)
Dept: MATERNAL FETAL MEDICINE | Facility: CLINIC | Age: 33
End: 2023-07-11

## 2023-07-11 ENCOUNTER — TELEPHONE (OUTPATIENT)
Dept: TRANSPLANT | Facility: CLINIC | Age: 33
End: 2023-07-11

## 2023-07-11 DIAGNOSIS — L29.9 PRURITIC DISORDER: Primary | ICD-10-CM

## 2023-07-11 DIAGNOSIS — N18.31 STAGE 3A CHRONIC KIDNEY DISEASE (H): ICD-10-CM

## 2023-07-11 DIAGNOSIS — Z94.0 KIDNEY REPLACED BY TRANSPLANT: ICD-10-CM

## 2023-07-11 DIAGNOSIS — Z13.1 ENCOUNTER FOR SCREENING EXAMINATION FOR IMPAIRED GLUCOSE REGULATION AND DIABETES MELLITUS: ICD-10-CM

## 2023-07-11 DIAGNOSIS — O09.92 HIGH-RISK PREGNANCY IN SECOND TRIMESTER: ICD-10-CM

## 2023-07-11 DIAGNOSIS — Z94.0 KIDNEY TRANSPLANTED: ICD-10-CM

## 2023-07-11 DIAGNOSIS — E87.5 HYPERKALEMIA: Primary | ICD-10-CM

## 2023-07-11 LAB
BACTERIA BLD CULT: ABNORMAL
BACTERIA BLD CULT: ABNORMAL
BACTERIA BLD CULT: NO GROWTH
BACTERIA BLD CULT: NO GROWTH
BILE AC SERPL-SCNC: 3 UMOL/L
GESTATIONAL GTT 1 HR POST DOSE: 178 MG/DL (ref 60–179)
GESTATIONAL GTT 2 HR POST DOSE: 128 MG/DL (ref 60–154)
GESTATIONAL GTT 3 HR POST DOSE: 117 MG/DL (ref 60–139)
GLUCOSE P FAST SERPL-MCNC: 91 MG/DL (ref 60–94)

## 2023-07-11 PROCEDURE — 82950 GLUCOSE TEST: CPT

## 2023-07-11 PROCEDURE — 36415 COLL VENOUS BLD VENIPUNCTURE: CPT

## 2023-07-11 PROCEDURE — 82947 ASSAY GLUCOSE BLOOD QUANT: CPT

## 2023-07-11 PROCEDURE — 82951 GLUCOSE TOLERANCE TEST (GTT): CPT

## 2023-07-11 RX ORDER — TACROLIMUS 5 MG/1
5 CAPSULE ORAL 2 TIMES DAILY
Qty: 60 CAPSULE | Refills: 11 | Status: ON HOLD | OUTPATIENT
Start: 2023-07-11 | End: 2023-08-15

## 2023-07-11 RX ORDER — TACROLIMUS 0.5 MG/1
0.5 CAPSULE ORAL 2 TIMES DAILY
Qty: 60 CAPSULE | Refills: 11 | Status: ON HOLD | OUTPATIENT
Start: 2023-07-11 | End: 2023-08-03

## 2023-07-11 RX ORDER — PATIROMER 8.4 G/1
8.4 POWDER, FOR SUSPENSION ORAL DAILY PRN
Qty: 5 PACKET | Refills: 1 | Status: SHIPPED | OUTPATIENT
Start: 2023-07-11 | End: 2023-11-27

## 2023-07-11 RX ORDER — MAGNESIUM OXIDE 400 MG/1
800 TABLET ORAL 2 TIMES DAILY
Qty: 90 TABLET | Refills: 1 | Status: SHIPPED | OUTPATIENT
Start: 2023-07-11 | End: 2023-07-24

## 2023-07-11 RX ORDER — SODIUM BICARBONATE 650 MG/1
1300 TABLET ORAL 2 TIMES DAILY
Qty: 360 TABLET | Refills: 3 | Status: SHIPPED | OUTPATIENT
Start: 2023-07-11 | End: 2023-07-24

## 2023-07-11 RX ORDER — HYDROXYZINE HYDROCHLORIDE 10 MG/1
10 TABLET, FILM COATED ORAL 3 TIMES DAILY PRN
Qty: 30 TABLET | Refills: 1 | Status: ON HOLD | OUTPATIENT
Start: 2023-07-11 | End: 2023-08-15

## 2023-07-11 RX ORDER — TACROLIMUS 1 MG/1
2 CAPSULE ORAL 2 TIMES DAILY
Qty: 360 CAPSULE | Refills: 3 | Status: ON HOLD | OUTPATIENT
Start: 2023-07-11 | End: 2023-08-03

## 2023-07-11 NOTE — TELEPHONE ENCOUNTER
7/11/2023 Called Mona to review 3 hr GTT WNL.  While on the phone with Mona, she indicated that she had bile acids and CMP drawn yesterday as ordered by another provider for itching.  Bile acids WNL and LFTs WNL without concern for cholestasis at this time.  However on review of CMP again with hyperkalemia with K of 5.5.  This was reviewed by Dr. Brandt with on call nephrologist/transplant Dr. Hill.  We reviewed that Mona had an admission several weeks ago for similar that was attributed to post-obstructive ATN and profound diuresis.  She was given Lokelma at that time by nephrology and ultimately  mL/hr and after that Insulin and D50 to shift potassium.  Telemetry was held due to potassium <5.9.  She was then started on sodium bicarb in D5W.  K then normalized.  Dr. Hill recommends outpatient management at this time and will have transplant care coordinators call Mona to initiate Lokelma and bicarbonate.  Strict precautions given.  She will be seen at [facility] on 7/13/23 and recommend repeat labs that day.    All questions answered.  Plan of care made with Dr. Karly Montiel MD   Maternal-Fetal Medicine Fellow, PGY7  7/11/2023 4:48 PM

## 2023-07-11 NOTE — TELEPHONE ENCOUNTER
[facility] received call from Dr. Hill, who consulted w/ [facility] re: K 5.5 per their lab draw.    Veltassa 8.4g, PRN for K = or  >5.5.   Follow low K diet.   Repeat labs Thursday.  Okay to increase bicarb to 1,300mg BID.       [facility] called to Mona with plan , she was agreeable.    In addition, REDUCE tacrolimus to 5.5mg BID.  Mona started having 2x daily loose stool.       Mona has c/o neuropathy and itching on palms and soles of feet. It progresses to more diffuse itching at night, affecting her sleep.  She denies any rash.  She has tried benadryl 50mg at night in the past.     Will reduce tacro as above to see if this improves itching.  Okay for benadryl as prior, PRN.   Mona had concern that this was due to her antibiotics.  Because of this, Mona has not yet started Keflex - [facility] encouraged her to start ASAP and notify [facility] if any rash / itching progression.

## 2023-07-11 NOTE — TELEPHONE ENCOUNTER
Labs noted below - TFTS and calcium normal.     -  Dear Mona    Here are your labs which show a normal thyroid function and normal TSH.  No changes to your current thyroid program.  Please recheck again in 1 month.    If you have any questions, please feel free to contact my nurse at [phone] select option #3 for triage nurse  or  option #1 for scheduling related questions.    Regards    Katrin Lopez MD     Lab on 07/11/2023   Component Date Value Ref Range Status     Gestational GTT Fasting 07/11/2023 91  60 - 94 mg/dL Final     Gestational GTT 1 Hr Post Dose 07/11/2023 178  60 - 179 mg/dL Final     Gestational GTT 2 Hr Post Dose 07/11/2023 128  60 - 154 mg/dL Final     Gestational GTT 3 Hr Post Dose 07/11/2023 117  60 - 139 mg/dL Final   Lab on 07/10/2023   Component Date Value Ref Range Status     WBC Count 07/10/2023 6.8  4.0 - 11.0 10e3/uL Final     RBC Count 07/10/2023 2.98 (L)  3.80 - 5.20 10e6/uL Final     Hemoglobin 07/10/2023 9.1 (L)  11.7 - 15.7 g/dL Final     Hematocrit 07/10/2023 28.5 (L)  35.0 - 47.0 % Final     MCV 07/10/2023 96  78 - 100 fL Final     MCH 07/10/2023 30.5  26.5 - 33.0 pg Final     MCHC 07/10/2023 31.9  31.5 - 36.5 g/dL Final     RDW 07/10/2023 13.8  10.0 - 15.0 % Final     Platelet Count 07/10/2023 305  150 - 450 10e3/uL Final     Total Protein Urine mg/dL 07/10/2023 9.3    mg/dL Final    The reference ranges have not been established in urine protein. The results should be integrated into the clinical context for interpretation.     Total Protein UR MG/MG CR 07/10/2023 0.25 (H)  0.00 - 0.20 mg/mg Cr Final     Creatinine Urine mg/dL 07/10/2023 37.2  mg/dL Final    The reference ranges have not been established in urine creatinine. The results should be integrated into the clinical context for interpretation.     Tacrolimus by Tandem Mass Spectrom* 07/10/2023 8.3  5.0 - 15.0 ug/L Final    Comment: Tacrolimus Reference Range (ug/L):    Kidney Transplant:  Pediatric  0-3 months post  transplant: 10-12  3-6 months post transplant: 8-10  6-12 months post transplant: 6-8  >12 months post transplant: 4-7    Adult  0-6 months post transplant: 8-10  6-12 months post transplant: 6-8  >12 months post transplant: 4-6  >5 years post transplant: 3-5    Heart Transplant:  Pediatric  0-12 months post transplant: 10-15  >12 months post transplant: 5-10    Adult  0-3 months post transplant: 10-15  3-6 months post transplant: 8-12  6-12 months post transplant: 6-12  >12 months post transplant: 6-10    Lung Transplant:  0-12 months post transplant: 10-15  >12 months post transplant: 8-12    Liver Transplant:  Pediatric  0-3 months post transplant: 10-15  3-6 months post transplant: 8-10  6 months-5 years post transplant: 6-8   >5 years post transplant: 1-3    Adult  0-3 months post transplant: 10-12  3-6 months post transplant: 8-10  >6 months post transplant: 6-8    Pancreas Transplant:  0-6                            months post transplant: 8-10  >6 months post transplant: 5-8     Tacrolimus Last Dose Date 07/10/2023 7/9/2023   Final     Tacrolimus Last Dose Time 07/10/2023 10:00 PM   Final     Calcium, Ionized pH 7.4 07/10/2023 1.18  1.11 - 1.30 mmol/L Final     pH 07/10/2023 7.36  7.35 - 7.45 Final     Calcium, Ionized Measured 07/10/2023 1.20  1.11 - 1.30 mmol/L Final     Bile Acids Total 07/10/2023 3  0 - 10 umol/L Final    INTERPRETIVE INFORMATION: Bile Acids, Total    Reference Interval applies to fasting specimens.  Performed By: [facility]  [address] [place] [id number]  : Raffi Elias MD, PhD     Sodium 07/10/2023 135 (L)  136 - 145 mmol/L Final     Potassium 07/10/2023 5.5 (H)  3.4 - 5.3 mmol/L Final     Chloride 07/10/2023 105  98 - 107 mmol/L Final     Carbon Dioxide (CO2) 07/10/2023 20 (L)  22 - 29 mmol/L Final     Anion Gap 07/10/2023 10  7 - 15 mmol/L Final     Urea Nitrogen 07/10/2023 27.1 (H)  6.0 - 20.0 mg/dL Final     Creatinine 07/10/2023 1.49  (H)  0.51 - 0.95 mg/dL Final     Calcium 07/10/2023 9.3  8.6 - 10.0 mg/dL Final     Glucose 07/10/2023 84  70 - 99 mg/dL Final     Alkaline Phosphatase 07/10/2023 117 (H)  35 - 104 U/L Final     AST 07/10/2023 21  0 - 45 U/L Final    Reference intervals for this test were updated on 6/12/2023 to more accurately reflect our healthy population. There may be differences in the flagging of prior results with similar values performed with this method. Interpretation of those prior results can be made in the context of the updated reference intervals.     ALT 07/10/2023 13  0 - 50 U/L Final    Reference intervals for this test were updated on 6/12/2023 to more accurately reflect our healthy population. There may be differences in the flagging of prior results with similar values performed with this method. Interpretation of those prior results can be made in the context of the updated reference intervals.       Protein Total 07/10/2023 7.5  6.4 - 8.3 g/dL Final     Albumin 07/10/2023 3.6  3.5 - 5.2 g/dL Final     Bilirubin Total 07/10/2023 0.6  <=1.2 mg/dL Final     GFR Estimate 07/10/2023 48 (L)  >60 mL/min/1.73m2 Final   Infusion Therapy Visit on 07/07/2023   Component Date Value Ref Range Status     Tacrolimus by Tandem Mass Spectrom* 07/07/2023 5.3  5.0 - 15.0 ug/L Final    Comment: Tacrolimus Reference Range (ug/L):    Kidney Transplant:  Pediatric  0-3 months post transplant: 10-12  3-6 months post transplant: 8-10  6-12 months post transplant: 6-8  >12 months post transplant: 4-7    Adult  0-6 months post transplant: 8-10  6-12 months post transplant: 6-8  >12 months post transplant: 4-6  >5 years post transplant: 3-5    Heart Transplant:  Pediatric  0-12 months post transplant: 10-15  >12 months post transplant: 5-10    Adult  0-3 months post transplant: 10-15  3-6 months post transplant: 8-12  6-12 months post transplant: 6-12  >12 months post transplant: 6-10    Lung Transplant:  0-12 months post transplant:  10-15  >12 months post transplant: 8-12    Liver Transplant:  Pediatric  0-3 months post transplant: 10-15  3-6 months post transplant: 8-10  6 months-5 years post transplant: 6-8   >5 years post transplant: 1-3    Adult  0-3 months post transplant: 10-12  3-6 months post transplant: 8-10  >6 months post transplant: 6-8    Pancreas Transplant:  0-6                            months post transplant: 8-10  >6 months post transplant: 5-8     Tacrolimus Last Dose Date 07/07/2023 7/6/2023   Final     Tacrolimus Last Dose Time 07/07/2023 10:00 PM   Final     Sodium 07/07/2023 136  136 - 145 mmol/L Final     Potassium 07/07/2023 5.1  3.4 - 5.3 mmol/L Final     Chloride 07/07/2023 107  98 - 107 mmol/L Final     Carbon Dioxide (CO2) 07/07/2023 19 (L)  22 - 29 mmol/L Final     Anion Gap 07/07/2023 10  7 - 15 mmol/L Final     Urea Nitrogen 07/07/2023 25.3 (H)  6.0 - 20.0 mg/dL Final     Creatinine 07/07/2023 1.36 (H)  0.51 - 0.95 mg/dL Final     Calcium 07/07/2023 9.5  8.6 - 10.0 mg/dL Final     Glucose 07/07/2023 87  70 - 99 mg/dL Final     GFR Estimate 07/07/2023 53 (L)  >60 mL/min/1.73m2 Final     Free T4 07/07/2023 1.14  0.90 - 1.70 ng/dL Final     T3 Free 07/07/2023 2.0  2.0 - 4.4 pg/mL Final     TSH 07/07/2023 1.32  0.30 - 4.20 uIU/mL Final     Total Protein Urine mg/dL 07/07/2023 <6.0    mg/dL Final     Total Protein UR MG/MG CR 07/07/2023    Final    Unable to calculate, urine creatinine or protein is outside the detectable limits.     Creatinine Urine mg/dL 07/07/2023 17.6  mg/dL Final     WBC Count 07/07/2023 7.3  4.0 - 11.0 10e3/uL Final     RBC Count 07/07/2023 2.70 (L)  3.80 - 5.20 10e6/uL Final     Hemoglobin 07/07/2023 8.3 (L)  11.7 - 15.7 g/dL Final     Hematocrit 07/07/2023 25.7 (L)  35.0 - 47.0 % Final     MCV 07/07/2023 95  78 - 100 fL Final     MCH 07/07/2023 30.7  26.5 - 33.0 pg Final     MCHC 07/07/2023 32.3  31.5 - 36.5 g/dL Final     RDW 07/07/2023 13.8  10.0 - 15.0 % Final     Platelet Count 07/07/2023  249  150 - 450 10e3/uL Final     % Neutrophils 07/07/2023 81  % Final     % Lymphocytes 07/07/2023 8  % Final     % Monocytes 07/07/2023 6  % Final     % Eosinophils 07/07/2023 3  % Final     % Basophils 07/07/2023 0  % Final     % Immature Granulocytes 07/07/2023 2  % Final     NRBCs per 100 WBC 07/07/2023 0  <1 /100 Final     Absolute Neutrophils 07/07/2023 5.9  1.6 - 8.3 10e3/uL Final     Absolute Lymphocytes 07/07/2023 0.6 (L)  0.8 - 5.3 10e3/uL Final     Absolute Monocytes 07/07/2023 0.5  0.0 - 1.3 10e3/uL Final     Absolute Eosinophils 07/07/2023 0.2  0.0 - 0.7 10e3/uL Final     Absolute Basophils 07/07/2023 0.0  0.0 - 0.2 10e3/uL Final     Absolute Immature Granulocytes 07/07/2023 0.1  <=0.4 10e3/uL Final     Absolute NRBCs 07/07/2023 0.0  10e3/uL Final   Infusion Therapy Visit on 07/06/2023   Component Date Value Ref Range Status     Culture 07/06/2023 No growth after 4 days   Preliminary     Culture 07/06/2023 No growth after 4 days   Preliminary   Admission on 07/02/2023, Discharged on 07/05/2023   Component Date Value Ref Range Status     WBC Count 07/02/2023 11.5 (H)  4.0 - 11.0 10e3/uL Final     RBC Count 07/02/2023 2.77 (L)  3.80 - 5.20 10e6/uL Final     Hemoglobin 07/02/2023 8.7 (L)  11.7 - 15.7 g/dL Final     Hematocrit 07/02/2023 27.1 (L)  35.0 - 47.0 % Final     MCV 07/02/2023 98  78 - 100 fL Final     MCH 07/02/2023 31.4  26.5 - 33.0 pg Final     MCHC 07/02/2023 32.1  31.5 - 36.5 g/dL Final     RDW 07/02/2023 14.1  10.0 - 15.0 % Final     Platelet Count 07/02/2023 176  150 - 450 10e3/uL Final     Sodium 07/02/2023 132 (L)  136 - 145 mmol/L Final     Potassium 07/02/2023 4.7  3.4 - 5.3 mmol/L Final     Chloride 07/02/2023 103  98 - 107 mmol/L Final     Carbon Dioxide (CO2) 07/02/2023 18 (L)  22 - 29 mmol/L Final     Anion Gap 07/02/2023 11  7 - 15 mmol/L Final     Urea Nitrogen 07/02/2023 27.9 (H)  6.0 - 20.0 mg/dL Final     Creatinine 07/02/2023 1.47 (H)  0.51 - 0.95 mg/dL Final     Calcium  07/02/2023 8.9  8.6 - 10.0 mg/dL Final     Glucose 07/02/2023 122 (H)  70 - 99 mg/dL Final     Alkaline Phosphatase 07/02/2023 100  35 - 104 U/L Final     AST 07/02/2023 12  0 - 45 U/L Final    Reference intervals for this test were updated on 6/12/2023 to more accurately reflect our healthy population. There may be differences in the flagging of prior results with similar values performed with this method. Interpretation of those prior results can be made in the context of the updated reference intervals.     ALT 07/02/2023 11  0 - 50 U/L Final    Reference intervals for this test were updated on 6/12/2023 to more accurately reflect our healthy population. There may be differences in the flagging of prior results with similar values performed with this method. Interpretation of those prior results can be made in the context of the updated reference intervals.       Protein Total 07/02/2023 7.2  6.4 - 8.3 g/dL Final     Albumin 07/02/2023 3.6  3.5 - 5.2 g/dL Final     Bilirubin Total 07/02/2023 0.8  <=1.2 mg/dL Final     GFR Estimate 07/02/2023 49 (L)  >60 mL/min/1.73m2 Final     Color Urine 07/02/2023 Yellow  Colorless, Straw, Light Yellow, Yellow Final     Appearance Urine 07/02/2023 Slightly Cloudy (A)  Clear Final     Glucose Urine 07/02/2023 Negative  Negative mg/dL Final     Bilirubin Urine 07/02/2023 Negative  Negative Final     Ketones Urine 07/02/2023 Negative  Negative mg/dL Final     Specific Gravity Urine 07/02/2023 1.010  1.003 - 1.035 Final     Blood Urine 07/02/2023 Moderate (A)  Negative Final     pH Urine 07/02/2023 5.5  5.0 - 7.0 Final     Protein Albumin Urine 07/02/2023 50 (A)  Negative mg/dL Final     Urobilinogen Urine 07/02/2023 Normal  Normal, 2.0 mg/dL Final     Nitrite Urine 07/02/2023 Positive (A)  Negative Final     Leukocyte Esterase Urine 07/02/2023 Large (A)  Negative Final     Bacteria Urine 07/02/2023 Few (A)  None Seen /HPF Final     WBC Clumps Urine 07/02/2023 Present (A)  None  Seen /HPF Final     RBC Urine 07/02/2023 2  <=2 /HPF Final     WBC Urine 07/02/2023 13 (H)  <=5 /HPF Final     Lactic Acid 07/02/2023 1.0  0.7 - 2.0 mmol/L Final     Culture 07/02/2023 Positive on the 1st day of incubation (A)   Final     Culture 07/02/2023 Escherichia coli (AA)   Final    1 of 2 bottles     Culture 07/02/2023 No Growth   Final     ABO/RH(D) 07/02/2023 O POS   Final     Antibody Screen 07/02/2023 Negative  Negative Final     SPECIMEN EXPIRATION DATE 07/02/2023 [id number]   Final     WBC Count 07/03/2023 11.4 (H)  4.0 - 11.0 10e3/uL Final     RBC Count 07/03/2023 2.73 (L)  3.80 - 5.20 10e6/uL Final     Hemoglobin 07/03/2023 8.4 (L)  11.7 - 15.7 g/dL Final     Hematocrit 07/03/2023 26.6 (L)  35.0 - 47.0 % Final     MCV 07/03/2023 97  78 - 100 fL Final     MCH 07/03/2023 30.8  26.5 - 33.0 pg Final     MCHC 07/03/2023 31.6  31.5 - 36.5 g/dL Final     RDW 07/03/2023 14.3  10.0 - 15.0 % Final     Platelet Count 07/03/2023 188  150 - 450 10e3/uL Final     Sodium 07/03/2023 136  136 - 145 mmol/L Final     Potassium 07/03/2023 5.1  3.4 - 5.3 mmol/L Final     Chloride 07/03/2023 106  98 - 107 mmol/L Final     Carbon Dioxide (CO2) 07/03/2023 18 (L)  22 - 29 mmol/L Final     Anion Gap 07/03/2023 12  7 - 15 mmol/L Final     Urea Nitrogen 07/03/2023 23.9 (H)  6.0 - 20.0 mg/dL Final     Creatinine 07/03/2023 1.37 (H)  0.51 - 0.95 mg/dL Final     Calcium 07/03/2023 9.0  8.6 - 10.0 mg/dL Final     Glucose 07/03/2023 108 (H)  70 - 99 mg/dL Final     Alkaline Phosphatase 07/03/2023 105 (H)  35 - 104 U/L Final     AST 07/03/2023 14  0 - 45 U/L Final    Reference intervals for this test were updated on 6/12/2023 to more accurately reflect our healthy population. There may be differences in the flagging of prior results with similar values performed with this method. Interpretation of those prior results can be made in the context of the updated reference intervals.     ALT 07/03/2023 10  0 - 50 U/L Final     Reference intervals for this test were updated on 6/12/2023 to more accurately reflect our healthy population. There may be differences in the flagging of prior results with similar values performed with this method. Interpretation of those prior results can be made in the context of the updated reference intervals.       Protein Total 07/03/2023 7.3  6.4 - 8.3 g/dL Final     Albumin 07/03/2023 3.4 (L)  3.5 - 5.2 g/dL Final     Bilirubin Total 07/03/2023 0.7  <=1.2 mg/dL Final     GFR Estimate 07/03/2023 53 (L)  >60 mL/min/1.73m2 Final     TSH 07/03/2023 0.83  0.30 - 4.20 uIU/mL Final     Tacrolimus by Tandem Mass Spectrom* 07/03/2023 4.6 (L)  5.0 - 15.0 ug/L Final    Comment: Tacrolimus Reference Range (ug/L):    Kidney Transplant:  Pediatric  0-3 months post transplant: 10-12  3-6 months post transplant: 8-10  6-12 months post transplant: 6-8  >12 months post transplant: 4-7    Adult  0-6 months post transplant: 8-10  6-12 months post transplant: 6-8  >12 months post transplant: 4-6  >5 years post transplant: 3-5    Heart Transplant:  Pediatric  0-12 months post transplant: 10-15  >12 months post transplant: 5-10    Adult  0-3 months post transplant: 10-15  3-6 months post transplant: 8-12  6-12 months post transplant: 6-12  >12 months post transplant: 6-10    Lung Transplant:  0-12 months post transplant: 10-15  >12 months post transplant: 8-12    Liver Transplant:  Pediatric  0-3 months post transplant: 10-15  3-6 months post transplant: 8-10  6 months-5 years post transplant: 6-8   >5 years post transplant: 1-3    Adult  0-3 months post transplant: 10-12  3-6 months post transplant: 8-10  >6 months post transplant: 6-8    Pancreas Transplant:  0-6                            months post transplant: 8-10  >6 months post transplant: 5-8     Tacrolimus Last Dose Date 07/03/2023    Final    Last dose information not provided.     Tacrolimus Last Dose Time 07/03/2023    Final    Last dose information not provided.      Acinetobacter species 07/02/2023 Not Detected  Not Detected Final     Citrobacter species 07/02/2023 Not Detected  Not Detected Final     Enterobacter species 07/02/2023 Not Detected  Not Detected Final     Proteus species 07/02/2023 Not Detected  Not Detected Final     Escherichia coli 07/02/2023 Detected (A)  Not Detected Final    Positive for Escherichia coli by Verigene multiplex nucleic acid test. Final identification and antimicrobial susceptibility testing will be verified by standard methods. Verigene test will not distinguish E. coli from Shigella species including Shigella dysenteriae, Shigella flexneri, Shigella boydii, and Shigella sonnei. Specimens containing Shigella species or E. coli will be reported as positive for E. coli.     Klebsiella pneumoniae 07/02/2023 Not Detected  Not Detected Final     Klebsiella oxytoca 07/02/2023 Not Detected  Not Detected Final     Pseudomonas aeruginosa 07/02/2023 Not Detected  Not Detected Final     CTX-M 07/02/2023 Not Detected  Not Detected, NA Final     KPC 07/02/2023 Not Detected  Not Detected, NA Final     NDM 07/02/2023 Not Detected  Not Detected, NA Final     VIM 07/02/2023 Not Detected  Not Detected, NA Final     IMP 07/02/2023 Not Detected  Not Detected, NA Final     OXA 07/02/2023 Not Detected  Not Detected, NA Final     Culture 07/03/2023 No Growth   Final     Culture 07/03/2023 No Growth   Final     WBC Count 07/04/2023 11.0  4.0 - 11.0 10e3/uL Final     RBC Count 07/04/2023 2.74 (L)  3.80 - 5.20 10e6/uL Final     Hemoglobin 07/04/2023 8.5 (L)  11.7 - 15.7 g/dL Final     Hematocrit 07/04/2023 27.0 (L)  35.0 - 47.0 % Final     MCV 07/04/2023 99  78 - 100 fL Final     MCH 07/04/2023 31.0  26.5 - 33.0 pg Final     MCHC 07/04/2023 31.5  31.5 - 36.5 g/dL Final     RDW 07/04/2023 14.2  10.0 - 15.0 % Final     Platelet Count 07/04/2023 199  150 - 450 10e3/uL Final     Sodium 07/04/2023 134 (L)  136 - 145 mmol/L Final     Potassium 07/04/2023 5.0  3.4 - 5.3  mmol/L Final     Chloride 07/04/2023 104  98 - 107 mmol/L Final     Carbon Dioxide (CO2) 07/04/2023 16 (L)  22 - 29 mmol/L Final     Anion Gap 07/04/2023 14  7 - 15 mmol/L Final     Urea Nitrogen 07/04/2023 22.2 (H)  6.0 - 20.0 mg/dL Final     Creatinine 07/04/2023 1.28 (H)  0.51 - 0.95 mg/dL Final     Calcium 07/04/2023 9.0  8.6 - 10.0 mg/dL Final     Glucose 07/04/2023 83  70 - 99 mg/dL Final     Alkaline Phosphatase 07/04/2023 96  35 - 104 U/L Final     AST 07/04/2023 13  0 - 45 U/L Final    Reference intervals for this test were updated on 6/12/2023 to more accurately reflect our healthy population. There may be differences in the flagging of prior results with similar values performed with this method. Interpretation of those prior results can be made in the context of the updated reference intervals.     ALT 07/04/2023 9  0 - 50 U/L Final    Reference intervals for this test were updated on 6/12/2023 to more accurately reflect our healthy population. There may be differences in the flagging of prior results with similar values performed with this method. Interpretation of those prior results can be made in the context of the updated reference intervals.       Protein Total 07/04/2023 6.8  6.4 - 8.3 g/dL Final     Albumin 07/04/2023 3.3 (L)  3.5 - 5.2 g/dL Final     Bilirubin Total 07/04/2023 0.5  <=1.2 mg/dL Final     GFR Estimate 07/04/2023 58 (L)  >60 mL/min/1.73m2 Final     Culture 07/04/2023 Positive on the 2nd day of incubation (A)   Final     Culture 07/04/2023 Staphylococcus capitis (AA)   Final    1 of 2 bottles     Culture 07/04/2023 No Growth   Final     Trichomonas 07/05/2023 Absent  Absent Final     Yeast 07/05/2023 Absent  Absent Final     Clue Cells 07/05/2023 Absent  Absent Final     WBCs/high power field 07/05/2023 None  None Final     Staphylococcus species 07/04/2023 Detected (A)  Not Detected Final    Positive for coagulase-negative Staphylococci, other than Staphylococcus epidermidis and  Staphylococcus lugdunensis, by [email or web address] multiplex nucleic acid test. Coagulase-negative Staphylococci are the most common venipuncture or collection associated skin contaminants grown in blood cultures. Final identification and antimicrobial susceptibility testing will be verified by standard methods.     Staphylococcus aureus 07/04/2023 Not Detected  Not Detected Final     Staphylococcus epidermidis 07/04/2023 Not Detected  Not Detected Final     Staphylococcus lugdunensis 07/04/2023 Not Detected  Not Detected Final     Enterococcus faecalis 07/04/2023 Not Detected  Not Detected Final     Enterococcus faecium 07/04/2023 Not Detected  Not Detected Final     Streptococcus species 07/04/2023 Not Detected  Not Detected Final     Streptococcus agalactiae 07/04/2023 Not Detected  Not Detected Final     Streptococcus anginosus group 07/04/2023 Not Detected  Not Detected Final     Streptococcus pneumoniae 07/04/2023 Not Detected  Not Detected Final     Streptococcus pyogenes 07/04/2023 Not Detected  Not Detected Final     Listeria species 07/04/2023 Not Detected  Not Detected Final

## 2023-07-12 ENCOUNTER — VIRTUAL VISIT (OUTPATIENT)
Dept: INFECTIOUS DISEASES | Facility: CLINIC | Age: 33
End: 2023-07-12
Attending: STUDENT IN AN ORGANIZED HEALTH CARE EDUCATION/TRAINING PROGRAM
Payer: MEDICARE

## 2023-07-12 DIAGNOSIS — Z94.0 KIDNEY REPLACED BY TRANSPLANT: ICD-10-CM

## 2023-07-12 DIAGNOSIS — N13.9 OBSTRUCTIVE UROPATHY: ICD-10-CM

## 2023-07-12 DIAGNOSIS — Z3A.29 29 WEEKS GESTATION OF PREGNANCY: ICD-10-CM

## 2023-07-12 DIAGNOSIS — N39.0 URINARY TRACT INFECTION WITHOUT HEMATURIA, SITE UNSPECIFIED: Primary | ICD-10-CM

## 2023-07-12 PROCEDURE — 99215 OFFICE O/P EST HI 40 MIN: CPT | Mod: 24 | Performed by: STUDENT IN AN ORGANIZED HEALTH CARE EDUCATION/TRAINING PROGRAM

## 2023-07-12 NOTE — LETTER
7/12/2023       RE: Mona Wagner  [address]  [facility] [address]     Dear Colleague,    Thank you for referring your patient, Mona Wagner, to the [facility] INFECTI[facility] [place] at [facility]. Please see a copy of my visit note below.    Virtual Visit Details    Type of service:  Video Visit   Video Start Time: 3:00 PM  Video End Time:3:30pm    Originating Location (pt. Location): Home    Distant Location (provider location):  Off-site  Platform used for Video Visit: [facility]  Transplant Infectious Disease Clinic Note:  Virtual ID follow up.      Patient:  Mona Wagner, Date of birth 1990, Medical record number [id number]  Date of Visit:  07/12/2023           Assessment and Recommendations:   Recommendations:  -The pt is on secondary prophy for UTI with keflex. However, she feels like she gets hives and itching with keflex but is willing to try it again once more tonight. If she were to have any further pruritus or hives then she will stop the keflex, if not she will continue the medication.  -if she were to have itchiness then there are two alternatives, first is to monitor off abx. There is no strong data that supports secondary prophylaxis (source: https://doi.org/10.1002/00224440.CH909153.pub2). The second option would be a prophylaxis with an alternative abx such as nitrofurantoin. This medication is safe in pregnancy until the pregnancy patients are at term (week 38 to 42). After taking about risks and benefits of prophy, she stated that if she were to have itching or hives with keflex then she will stop the abx and hold off on switching to nitrofurantoin and be monitored closely. I will also discuss this plan with her OB doctor as well.   -will recommend close monitoring with UA/culture at least once a trimester and more frequently if she were to have symptoms and will treat all urine  cultures regardless of symptoms while she is pregnant.   -as she is pregnant, will recommend Tdap vaccine  RTC as needed    Assessment:  Mona Wagner is a 29 year old with PMH of Kidney transplant Aug 2019 likely from  IgA nephropathy with c/b hydronephrosis 2/2 ureteral stenosis with nephrostomy tube (June 9th) due to the pregnancy. She is on Azathioprine, and tacro who is here for follow up for E. Coli bacteremia. Of note, she is now pregnant.     E Coli bacteremia and sepsis from an UTI on 6/30/23  -Fever, chills and some dysuria and UTI dx at clinic on 6/30  -culture growing E coli and blood cultures with E coli 1/2  -Ecoli susceptibilities are R to ampicllin, cipro and levo, I to uansyn   -s/p ceftriaxone 2g until 7/8/23 for 7d of abx and now on keflex. She started the keflex last night.      Compressive urethral obstruction secondary pregnancy, s/p nephro tube   She has a nephrostomy tube (placed on 6/9) while pregnancy as the baby is compressing the ureter and will be removed after pregnancy.     Jasen De La Torre MD. Pager [phone]         History of the Infectious Disease lllness:   Mona Wagner is a 29 year old with PMH of Kidney transplant Aug 2019 likely from  IgA nephropathy with c/b hydronephrosis 2/2 ureteral stenosis with nephrostomy tube on 6/9 who is on Azathioprine, and tacro who is here for follow up for E. Coli bacteremia. Of note, she is now pregnant who is 29 weeks this friday. She was on ceftraixone for 7d with last date on 7/8/23. She was placed on prophy cephalosporin and is here for ID follow up.     The pt is seen over the video. She states that she has allergies feeling with some itchy throat. She did start taking keflex yesterday and thinks that she had some hives on his elbows which resolved after some topical steroid cream. She no longer has fevers or chills, no headache, no ear pain, some sob, no cough, n/v/d, no abd pain, no pain over the transplanted kidney. She has a nephrostomy tube  while pregnancy as the baby is compressing the ureter and will be removed after pregnancy. She states that since transplant she had one UTI. She states that every time she has a UTI, its due to a urological procedure.     Born in [place] and works as a pharmacy tech. Having a baby. No travel outside the [place]. She has no pets. No undercooked meats. Lives with her . No recent outdoor activities. No hunting, fishing, gardening.    Transplants:  8/3/2019 (Kidney); Postoperative day:  1439.  Coordinator Jennifer Trejo    Review of Systems:  CONSTITUTIONAL:  No fevers or chills. No night sweats.  EYES: negative for icterus or acute vision changes.   ENT:  negative for hearing loss, tinnitus or sore throat  RESPIRATORY:  negative for cough, sputum, dyspnea  CARDIOVASCULAR:  negative for chest pain, heart palpitations  GASTROINTESTINAL:  negative for nausea, vomiting, diarrhea or constipation  GENITOURINARY:  negative for dysuria or hematuria.  HEME:  No easy bruising or bleeding  INTEGUMENT:  negative for rash or pruritus  NEURO:  Negative for headache or tremor.    Past Medical History:   Diagnosis Date    Anemia in chronic kidney disease     Bacteremia 7/5/2023    History of bacterial endocarditis     History of blood transfusion     History of DVT (deep vein thrombosis) 2014    History of seizure 2014    History of subarachnoid hemorrhage     Hydronephrosis     Hypothyroidism     Kidney transplanted 08/03/2019    DCD DDKT. Induction with thymo 6mg/kg.    Orthostatic hypotension     FATOUMATA (obstructive sleep apnea)     Pyelonephritis of transplanted kidney 01/23/2020    Secondary renal hyperparathyroidism (H)     Urinary tract infection        Past Surgical History:   Procedure Laterality Date    BENCH KIDNEY N/A 8/3/2019    Procedure: BACKBENCH PREPARATION, ALLOGRAFT, KIDNEY;  Surgeon: Dorian Johnson MD;  Location: [facility]    COMBINED CYSTOSCOPY, RETROGRADES, EXCHANGE STENT URETER(S) Right 11/23/2020    Procedure:  CYSTOSCOPY, CYSOTGRAM, WITH RETROGRADE PYELOGRAM, ureteroscopy,  AND URETERAL STENT;  Surgeon: Wally Britt MD;  Location[facility]    COMBINED CYSTOSCOPY, RETROGRADES, URETEROSCOPY, LASER HOLMIUM LITHOTRIPSY URETER(S), INSERT STENT N/A 2019    Procedure: CYSTOURETEROSCOPY, WITH RETROGRADE PYELOGRAM of transplant kidney, STENT INSERTION, Laser on standby;  Surgeon: Wally Britt MD;  [facility]    CREATE FISTULA ARTERIOVENOUS UPPER EXTREMITY  2014    Procedure: CREATE FISTULA ARTERIOVENOUS UPPER EXTREMITY;  Left Wrist Arteriovenous Fistula Placement;  Surgeon: Shashi Castro MD;  Location: [facility]    CREATE FISTULA ARTERIOVENOUS UPPER EXTREMITY      CYSTOSCOPY, RETROGRADES, INSERT STENT URETER(S), COMBINED N/A 2020    Procedure: CYSTOSCOPY, WITH RETROGRADE PYELOGRAM, CYSTOGRAM AND URETERAL STENT INSERTION - TRANSPLANT KIDNEY;  Surgeon: Wally Britt MD;  [facility]    IR CEREBRAL ANGIOGRAM  2014    IR NEPHROSTOMY TUBE PLACEMENT RIGHT  2023    PARATHYROIDECTOMY Bilateral 2023    Procedure: Bilateral Resection of 3 and 1/2 parathyroid glands;  Surgeon: Melissa Jones MD;  Location: [facility] OR    PERCUTANEOUS BIOPSY KIDNEY Right 2019    Procedure: Right Kidney Biopsy;  Surgeon: Deny Rios MD;  [facility]    RASTA/DIALYSIS CATHETER  12/10/2013         TRANSPLANT KIDNEY RECIPIENT  DONOR N/A 8/3/2019    Procedure: TRANSPLANT, KIDNEY, RECIPIENT,  DONOR with Ureteral Stent Placement;  Surgeon: Dorian Johnson MD;  [facility]       Family History   Problem Relation Age of Onset    Kidney Disease Mother     Kidney failure Mother     Coronary Artery Disease Father     Cerebrovascular Disease Father     Heart Disease Father     Sleep Apnea Father     Hypertension Sister     Diabetes Sister     Cerebrovascular Disease Sister     Kidney Disease Sister     Breast Cancer No family hx of     Cancer - colorectal No family hx of      Ovarian Cancer No family hx of     Prostate Cancer No family hx of     Other Cancer No family hx of     Asthma No family hx of     Anesthesia Reaction No family hx of     Deep Vein Thrombosis (DVT) No family hx of     Melanoma No family hx of     Skin Cancer No family hx of     Thrombosis No family hx of        Social History     Social History Narrative    Date of Service:5/15/2013     Name: Mona VALDOVINOS (Month, Day, Year of birth): 1990     MRN: [id number]     New Patient: Yes    Preferred Language: English     Needed: No    County of Residence: [place]    Marital Status:     Household size: 8    Number of Dependents:0     Pregnant: 0    Average Monthly Income: $ 600    Citizenship Status: Citizen    Gave Pt [employer] and [employer] applications     Social History     Tobacco Use    Smoking status: Never    Smokeless tobacco: Never   Vaping Use    Vaping Use: Never used   Substance Use Topics    Alcohol use: No     Alcohol/week: 0.0 standard drinks of alcohol    Drug use: No       Immunization History   Administered Date(s) Administered    COVID-19 Monovalent 18+ (Moderna) 2021, 2021, 2021    COVID-19 Monovalent Booster 18+ (Moderna) 2022    DTAP (<7y) 1997    Flu, Unspecified 2018, 2020    HEPA 2008, 2009    HEPATITIS A (PEDS 12M-18Y) 2008    HPV 2008, 2009, 06/10/2010    HPV Quadrivalent 2008, 2009, 06/10/2010    HepA-Peds, Unspecified 2009    HepB 1997, 2005, 2005    Hepatitis B (Peds <19Y) 1997, 2005, 2005    Influenza (IIV3) PF 2008, 2013, 10/15/2015, 2016, 2017, 2018    Influenza Vaccine >6 months (Alfuria,Fluzone) 2014, 10/07/2019, 2020, 10/13/2021, 09/15/2022    Influenza Vaccine IM Ages 6-35 Months 4 Valent (PF) 2022    Influenza Vaccine Im 4yrs+ 4 Valent CCIIV4 2020    MMR 2005    Marycarmen  Tuberculin Skin Test 2013    Meningococcal (Menomune ) 2009    Meningococcal ACWY (Menactra ) 2009    OPV, trivalent, live 1997    Pneumo Conj 13-V (2010&after) 2021    Pneumococcal 23 valent 2013, 2018    Poliovirus, inactivated (IPV) 1997    TD,PF 7+ (Tenivac) 2005    TDAP (Adacel,Boostrix) 2009    TDAP Vaccine (Adacel) 2009    TDAP Vaccine (Boostrix) 2022    Varicella 2008, 2009       Patient Active Problem List   Diagnosis    DVT of upper extremity (deep vein thrombosis) (H)    Seizure disorder (H)    Acquired hypothyroidism    Increased prolactin level    Aftercare following organ transplant    Immunosuppressed status (H)    Kidney replaced by transplant    Anxiety    Vitamin D deficiency    CKD (chronic kidney disease) stage 3, GFR 30-59 ml/min (H)    Bicornate uterus    Ureteral stenosis    Orthostatic hypotension    Tertiary hyperparathyroidism (H)    High-risk pregnancy in second trimester    Dehydration    Stage 3a chronic kidney disease (H)    Anemia of chronic renal failure    Elevated blood pressure affecting pregnancy in second trimester, antepartum     contractions    Encounter for triage in pregnant patient    Bacteremia       No outpatient medications have been marked as taking for the 23 encounter (Appointment) with Jasen De La Torre MD.       Allergies   Allergen Reactions    Contrast Dye Rash     CT contrast allergy 19 rash over eyes. Need to have pre medication before a CT WITH CONTRAST     Diatrizoate Rash     CT contrast allergy 19 rash over eyes. Need to have pre medication before a CT WITH CONTRAST     Lisinopril Swelling     angioedema    Nitrous Oxide Other (See Comments)     Sense of doom    Chlorhexidine Rash     Rash at site    Sulfa Antibiotics Rash     Muscle stiffness of neck    Dapsone      Methemoglobinemia    Furosemide Other (See Comments)     Skin flushing    Metrogel  [Metronidazole]      Hives diffusely on body after vaginal administration.    Azithromycin Dizziness and Rash    Cefuroxime Rash              Physical Exam:   Vitals were reviewed.  All vitals stable  LMP 12/16/2022   Wt Readings from Last 4 Encounters:   07/04/23 78.9 kg (174 lb)   06/29/23 77.7 kg (171 lb 6.4 oz)   06/20/23 77.7 kg (171 lb 3.2 oz)   06/19/23 78 kg (172 lb)       Exam:  GENERAL: well-developed, well-nourished, alert, oriented, in no acute distress over video.  HEAD: Head is normocephalic, atraumatic   EYES: Eyes have anicteric sclerae.    NEUROLOGIC: Grossly nonfocal.         Laboratory Data:     Absolute CD4   Date Value Ref Range Status   07/08/2020 201 (L) 441 - 2,156 cells/uL Final   05/06/2020 189 (L) 441 - 2,156 cells/uL Final   02/06/2020 149 (L) 441 - 2,156 cells/uL Final       Inflammatory Markers    Recent Labs   Lab Test 01/23/20  1301 08/05/19  0532 02/12/19  1212 01/19/19  0600 01/18/19  2219   SED 29*  --   --  116* 123*   CRP 26.0*  --  1.4* 8.3* 9.6*   G6PD  --  13.6  --   --   --        Immune Globulin Studies   No lab results found.    Metabolic Studies    Recent Labs   Lab Test 07/10/23  1028 07/07/23  1014 07/04/23  0817 07/03/23  0754 07/02/23  1924 07/02/23  1704 06/29/23  1300 06/16/23  0723 06/15/23  1009 08/07/20  0915 08/04/20  0905 09/03/19  0944 09/02/19  0946 08/02/19  1938 04/26/19  1050   * 136 134*   < >  --    < >  --    < > 133*   < > 139   < > 141   < > 135   POTASSIUM 5.5* 5.1 5.0   < >  --    < >  --    < > 4.8   < > 4.3   < > 4.5   < > 4.8   CHLORIDE 105 107 104   < >  --    < >  --    < > 100   < > 111*   < > 114*  114*   < > 95   CO2 20* 19* 16*   < >  --    < >  --    < > 20*   < > 23   < > 20*   < > 31   ANIONGAP 10 10 14   < >  --    < >  --    < > 13   < > 5   < > 7   < > 9   BUN 27.1* 25.3* 22.2*   < >  --    < >  --    < > 27.6*   < > 21   < > 24*   < > 78*   CR 1.49* 1.36* 1.28*   < >  --    < >  --    < > 1.25*   < > 1.00   < > 1.50*   < >  14.00*   [id number]  --   --   --   --   --   --   --   --   --   --   --   --  1.50*   < >  --    GFRESTIMATED 48* 53* 58*   < >  --    < >  --    < > 59*   < > 77   < > 42*   < > 3*   GLC 84 87 83   < >  --    < >  --    < > 138*   < > 79   < > 88   < > 106*   SHAMIR 9.3 9.5 9.0   < >  --    < >  --    < > 9.6   < > 9.4   < > 10.7*   < > 9.4   PHOS  --   --   --   --   --   --   --   --  3.6   < >  --    < > 2.1*   < >  --    MAG  --   --   --   --   --   --  2.0   < > 1.3*   < >  --    < > 1.3*   < >  --    URIC  --   --   --   --   --   --   --   --   --   --  7.2*   < >  --   --   --    LACT  --   --   --   --  1.0  --   --   --   --   --   --    < >  --    < >  --    CKT  --   --   --   --   --   --   --   --   --   --   --   --   --   --  47    < > = values in this interval not displayed.       Hepatic Studies    Recent Labs   Lab Test 07/10/23  1028 07/04/23  0817 07/03/23  0754 06/22/22  0639 08/04/20  0905 02/03/20  1029 01/24/20  1439 01/06/19  1350 12/29/16  1338   BILITOTAL 0.6 0.5 0.7   < > 0.7   < >  --    < > 1.2   DBIL  --   --   --   --  <0.1   < >  --   --   --    ALKPHOS 117* 96 105*   < > 185*   < >  --    < > 105   PROTTOTAL 7.5 6.8 7.3   < > 7.9   < >  --    < > 8.3   ALBUMIN 3.6 3.3* 3.4*   < > 4.0   < >  --    < > 4.4   AST 21 13 14   < > 11   < >  --    < > 8   ALT 13 9 10   < > 19   < >  --    < > 16   LDH  --   --   --   --   --   --  207  --  185    < > = values in this interval not displayed.       Pancreatitis testing    Recent Labs   Lab Test 06/22/22  0639 08/20/21  1020 08/07/20  0915   LIPASE 39  --   --    TRIG  --  120 123       Lipid testing    Recent Labs   Lab Test 08/20/21  1020 08/07/20  0915 02/03/20  1029   CHOL 190 162 166   HDL 38* 39* 41*    98 100*   TRIG 120 123 125       Gout Labs      Recent Labs   Lab Test 08/04/20  0905 02/03/20  1029   URIC 7.2* 6.0       Hematology Studies   Recent Labs   Lab Test 07/10/23  1028 07/07/23  1014 07/04/23  0817 07/03/23  0754  07/02/23  1704 06/29/23  1028 03/03/21  0912 02/02/21  0920 01/27/20  0900 01/26/20  0627 09/03/19  0944 09/02/19  0946   WBC 6.8 7.3 11.0 11.4*   < > 9.6   < > 6.3   < > 4.2   < > 4.4   60822  --   --   --   --   --   --   --   --   --   --   --  4.4   ANEU  --   --   --   --   --   --   --  4.3  --  3.1   < >  --    ANEUTAUTO  --  5.9  --   --   --  7.9   < >  --   --   --   --  3.6   ALYM  --   --   --   --   --   --   --  1.2  --  0.5*   < >  --    ALYMPAUTO  --  0.6*  --   --   --  0.9   < >  --   --   --   --  0.3*   YASIR  --   --   --   --   --   --   --  0.6  --  0.4   < >  --    AMONOAUTO  --  0.5  --   --   --  0.5   < >  --   --   --   --  0.3   AEOS  --   --   --   --   --   --   --  0.1  --  0.0   < >  --    AEOSAUTO  --  0.2  --   --   --  0.1   < >  --   --   --   --   --    ABSBASO  --  0.0  --   --   --  0.0   < >  --   --   --   --  0.0   HGB 9.1* 8.3* 8.5* 8.4*   < > 9.4*   < > 12.7   < > 8.2*   < > 7.6*   04746  --   --   --   --   --   --   --   --   --   --   --  7.6*   HCT 28.5* 25.7* 27.0* 26.6*   < > 29.6*   < > 39.9   < > 26.3*   < > 23.7*    249 199 188   < > 228   < > 237   < > 118*   < > 274   72347  --   --   --   --   --   --   --   --   --   --   --  274    < > = values in this interval not displayed.       Clotting Studies    Recent Labs   Lab Test 06/12/23  1003 06/09/23  0700 03/22/23  1200 08/30/19  2210   INR 1.06 0.99 1.02 0.95   PTT 31  --   --   --        Iron Testing    Recent Labs   Lab Test 07/10/23  1028 05/25/23  0919 05/22/23  1023 05/22/23  1022 05/03/23  0905 04/25/23  1035 08/02/22  1032 07/13/22  1220 03/16/17  0958 12/29/16  1338   IRON  --   --   --  80  --  49  --  51   < > 137   FEB  --   --   --  195*  --  181*  --  217*   < > 228*   IRONSAT  --   --   --  41  --  27  --  24   < > 60*   MONET  --   --  1,618*  --   --  1,770*  --  1,111*   < > 1,039*   MCV 96   < >  --   --    < > 93   < >  --    < > 96   FOLIC  --   --   --   --   --   --   --   --   --   45.6   B12  --   --   --   --   --   --   --   --   --  937    < > = values in this interval not displayed.       Markers  No lab results found.    Invalid input(s): FETOPROTEIN, SERUM, AFP    Autoimmune Testing   No lab results found.    Invalid input(s): ANCAB, PANCA, CANCA    Arterial Blood Gas Testing    Recent Labs   Lab Test 07/10/23  1028 06/13/23  0649 06/08/23  1009 06/06/23  1029 05/30/23  1025 05/22/23  1028 05/15/23  1011 04/01/22  1000 08/03/19  1047   PH 7.36  --   --  7.36 7.34* 7.38 7.37   < >  --    O2PER  --  21 21  --   --   --   --   --  40    < > = values in this interval not displayed.        Thyroid Studies     Recent Labs   Lab Test 07/07/23  1014 07/03/23  0754 06/16/23  0723 06/06/23  1029 05/15/23  1011 03/22/23  1200 02/28/23  0821 11/19/19  1217 08/21/19  1226   TSH 1.32 0.83 2.51 2.28 1.77   < > 0.37   < > 2.57   T4 1.14  --   --  1.00 0.94   < > 1.28   < > 1.05   T3  --   --   --   --   --   --  207*  --  97   FT3 2.0  --   --  2.2 2.0   < >  --   --   --     < > = values in this interval not displayed.       Urine Studies     Recent Labs   Lab Test 07/02/23  1916 06/30/23  1116 06/26/23  1037 06/11/23  1532 03/29/23  1257   URINEPH 5.5 6.5 7.0 7.5* 6.0   NITRITE Positive* Negative Negative Negative Negative   LEUKEST Large* Moderate* Negative Small* Negative   WBCU 13* 6* <1 27* 1       Medication levels    Recent Labs   Lab Test 07/10/23  1028 09/09/19  0910 09/03/19  0944   TACROL 8.3   < > 9.4   MPACID  --   --  3.39   MPAG  --   --  52.8    < > = values in this interval not displayed.       CSF testing   No lab results found.    Invalid input(s): CADAM, EVPCR, ENTPCR, ENTEROVIRUS    Microbiology:  Fungal testing  No lab results found.    Invalid input(s): HIFUN, FUNGL    Beta D Glucan levels (Fungitell assay)    No results found for: FGTL, FGTLI     Last Culture results   Culture   Date Value Ref Range Status   07/06/2023 No Growth  Final   07/06/2023 No Growth  Final   07/04/2023  No Growth  Final   07/04/2023 Positive on the 2nd day of incubation (A)  Final   07/04/2023 Staphylococcus capitis (AA)  Final     Comment:     1 of 2 bottles   07/03/2023 No Growth  Final   07/03/2023 No Growth  Final   07/02/2023 Positive on the 1st day of incubation (A)  Final   07/02/2023 Escherichia coli (AA)  Final     Comment:     1 of 2 bottles   07/02/2023 No Growth  Final   06/30/2023 >100,000 CFU/mL Escherichia coli (A)  Final   06/08/2023 10,000-50,000 CFU/mL Mixture of urogenital anita  Final   03/29/2023 <10,000 CFU/mL Mixture of urogenital anita  Final   03/15/2023 No Growth  Final   01/31/2022 No Growth  Final   07/16/2021 >100,000 CFU/mL Escherichia coli (A)  Final   01/18/2019 STREPTOCOCCUS VIRIDANS GROUP (A)  Final     Comment:     Streptococcus viridans group   01/17/2019 STREPTOCOCCUS VIRIDANS GROUP (A)  Final     Comment:     Streptococcus viridans group  Sent to referral lab for further identification       Culture Micro   Date Value Ref Range Status   02/26/2021 (A)  Final    50,000 to 100,000 colonies/mL  Streptococcus agalactiae sero group B  This organism is susceptible to ampicillin, penicillin, vancomycin and the cephalosporins.   If treatment is required AND your patient is allergic to penicillin, contact the   Microbiology Lab within 5 days to request susceptibility testing.  Group B Streptococcus may be significant in OB patients, however, it is part of the normal   urogenital anita.     02/26/2021   Final    <10,000 colonies/mL  mixed urogenital anita  Susceptibility testing not routinely done     02/09/2021 (A)  Final    10,000 to 50,000 colonies/mL  Streptococcus agalactiae sero group B  This organism is susceptible to ampicillin, penicillin, vancomycin and the cephalosporins.   If treatment is required AND your patient is allergic to penicillin, contact the   Microbiology Lab within 5 days to request susceptibility testing.  Testing unavailable due to 's  backorder.  Group B Streptococcus may be significant in OB patients, however, it is part of the normal   urogenital anita.     02/09/2021   Final    <10,000 colonies/mL  mixed urogenital anita  Susceptibility testing not routinely done     11/17/2020 No growth  Final   11/06/2020 (A)  Final    <10,000 colonies/mL  Streptococcus agalactiae sero group B  This organism is susceptible to ampicillin, penicillin, vancomycin and the cephalosporins.   If treatment is required AND your patient is allergic to penicillin, contact the   Microbiology Lab within 5 days to request susceptibility testing.     10/09/2020 No growth  Final   08/15/2020 (A)  Final    10,000 to 50,000 colonies/mL  Streptococcus agalactiae sero group B     08/15/2020   Final    This organism is susceptible to ampicillin, penicillin, vancomycin and the cephalosporins.   If treatment is required AND your patient is allergic to penicillin, contact the   Microbiology Lab within 5 days to request susceptibility testing.     08/11/2020 (A)  Final    <10,000 colonies/mL  Streptococcus agalactiae sero group B  This organism is susceptible to ampicillin, penicillin, vancomycin and the cephalosporins.   If treatment is required AND your patient is allergic to penicillin, contact the   Microbiology Lab within 5 days to request susceptibility testing.  Group B Streptococcus may be significant in OB patients, however, it is part of the normal   urogenital anita.     08/11/2020 <10,000 colonies/mL  urogenital anita    Final   08/11/2020 Susceptibility testing not routinely done  Final     Escherichia coli   Date Value Ref Range Status   07/02/2023 Detected (A) Not Detected Final     Comment:     Positive for Escherichia coli by RegeneMedigene multiplex nucleic acid test. Final identification and antimicrobial susceptibility testing will be verified by standard methods. Verigene test will not distinguish E. coli from Shigella species including Shigella dysenteriae, Shigella  flexneri, Shigella boydii, and Shigella sonnei. Specimens containing Shigella species or E. coli will be reported as positive for E. coli.         Last checks of Clostridioides difficile testing  Recent Labs   Lab Test 10/02/20  1345 01/24/20  1850 08/19/19  1300 02/12/19  1234   CDBPCT Negative Negative Negative Negative       No components found for: AFBSTN    Syphilis Testing  Invalid input(s): [id number]    Tick Testing  No lab results found.    Invalid input(s): APHAGM    ASO Testing  Invalid input(s): [id number]    Quantiferon testing   Recent Labs   Lab Test 07/07/23  1014 06/29/23  1028   LYMPH 8 9       Infection Studies to assess Diarrhea  Recent Labs   Lab Test 10/04/20  0953 10/02/20  1345 01/24/20  1850   EPSTX1 Not Detected Canceled, Test credited Not Detected   EPSTX2 Not Detected Canceled, Test credited Not Detected   EPCAMP Detected, Abnormal Result* Canceled, Test credited Not Detected   EPSALM Not Detected Canceled, Test credited Not Detected   EPSHGL Not Detected Canceled, Test credited Not Detected   EPVIB Not Detected Canceled, Test credited Not Detected   EPROTA Not Detected Canceled, Test credited Not Detected   EPNORO Not Detected Canceled, Test credited Not Detected   EPYER Not Detected Canceled, Test credited Not Detected       Virology:  Coronavirus-19 testing    Recent Labs   Lab Test 11/20/20  1310 08/18/20  1525 08/15/20  0001   [id number] Not Detected Not Detected Not Detected   [id number] Nasopharyngeal Nasopharyngeal Nasopharyngeal       Respiratory virus (non-coronavirus-19) testing    Recent Labs   Lab Test 01/24/20  1132 01/23/20  1301   AFLU  --  Negative   IFLUA Not Detected  --    FLUAH1 Not Detected  --    LA5225 Not Detected  --    FLUAH3 Not Detected  --    BFLU  --  Negative   IFLUB Not Detected  --    PIV1 Not Detected  --    PIV2 Not Detected  --    PIV3 Not Detected  --    PIV4 Not Detected  --    RSVA Not Detected  --    RSVB Not Detected  --    HMPV Not Detected  --     RHINEV Not Detected  --    ADENOV Not Detected  --    CORONA Not Detected  --        CMV viral loads    CMV Quant IU/mL   Date Value Ref Range Status   10/05/2020 CMV DNA Not Detected CMVND^CMV DNA Not Detected [IU]/mL Final     Comment:     Mutations within the highly conserved regions of the viral genome covered by   the RAFAELA AmpliPrep/RAFAELA TaqMan CMV Test primers and/or probes have been   identified and may result in under-quantitation of or failure to detect the   virus.  Supplemental testing methods should be used for testing when this is   suspected.  The RAFAELA AmpliPrep/RAFAELA TaqMan CMV Test is an FDA-approved in vitro nucleic   acid amplification test for the quantitation of cytomegalovirus DNA in human   plasma (EDTA plasma) using the [employer] LikeiPrep Instrument for automated viral   nucleic acid extraction and the RepRegen TaqMan Analyzer or RepRegen TaqMan for   automated Real Time amplification and detection of the viral nucleic acid   target.  Titer results are reported in International Units/mL (IU/mL using 1st WHO   International standard for Human Cytomegalovirus for Nucleic Acid   Amplification based assays. The conversion factor between CMV DNA copis/mL (as   defined by the Roche RAFAELA TaqMan CMV test) and International Units is the   CMV DNA concentration in IU/mL x 1.1 copies/IU = CMV DNA in copies/mL.  This assay has received FDA approval for the testing of human plasma only. The   [facility] at the [facility], [place], has validated the performance characteristics of the   Roche CMV assay for plasma, bronchial alveolar lavage/wash and urine.     01/23/2020 CMV DNA Not Detected CMVND^CMV DNA Not Detected [IU]/mL Final     Comment:     Mutations within the highly conserved regions of the viral genome covered by   the RAFAELA AmpliPrep/RAFAELA TaqMan CMV Test primers and/or probes have been   identified and may result in under-quantitation of  or failure to detect the   virus.  Supplemental testing methods should be used for testing when this is   suspected.  The RAFAELA AmpliPrep/RAFAELA TaqMan CMV Test is an FDA-approved in vitro nucleic   acid amplification test for the quantitation of cytomegalovirus DNA in human   plasma (EDTA plasma) using the RAFAELA AmpliPrep Instrument for automated viral   nucleic acid extraction and the RAFAELA TaqMan Analyzer or RAFAELA TaqMan for   automated Real Time amplification and detection of the viral nucleic acid   target.  Titer results are reported in International Units/mL (IU/mL using 1st WHO   International standard for Human Cytomegalovirus for Nucleic Acid   Amplification based assays. The conversion factor between CMV DNA copis/mL (as   defined by the Roche RAFAELA TaqMan CMV test) and International Units is the   CMV DNA concentration in IU/mL x 1.1 copies/IU = CMV DNA in copies/mL.  This assay has received FDA approval for the testing of human plasma only. The   [facility] at the [facility], [place], has validated the performance characteristics of the   Roche CMV assay for plasma, bronchial alveolar lavage/wash and urine.       Log IU/mL of CMVQNT   Date Value Ref Range Status   10/05/2020 Not Calculated <2.1 [Log_IU]/mL Final   01/23/2020 Not Calculated <2.1 [Log_IU]/mL Final       CMV resistance testing  No lab results found.  No results found for: CMVCID, CMVFOS, CMVGAN    No results found for: H6RES    EBV DNA Copies/mL   Date Value Ref Range Status   01/25/2020 EBV DNA Not Detected EBVNEG^EBV DNA Not Detected [Copies]/mL Final       BK viral loads   Recent Labs   Lab Test 04/01/22  1000 01/11/22  0941 12/21/21  0943 11/11/21  0921 10/13/21  0932 09/07/21  0910 08/02/21  0920 07/07/21  0920 06/02/21  0925 05/03/21  0902 04/06/21  0920 03/03/21  0912 02/02/21  0920 12/28/20  0910 12/01/20  0909 11/03/20  0910 10/05/20  0911 09/28/20  0920 09/08/20  0930  08/04/20  0905 07/08/20  0918 06/01/20  0856 04/29/20  0850 04/06/20  0837 03/02/20  0858 02/03/20  1029 01/06/20  0840 12/03/19  1112 12/02/19  0850 11/04/19  0850 10/07/19  0840 09/17/19  0625 09/03/19  0944   BKSPEC  --   --   --   --   --   --   --  Plasma Plasma Plasma Plasma Plasma Plasma, EDTA anticoagulant Plasma Plasma Plasma Plasma Plasma EDTA PLASMA Plasma Plasma, EDTA anticoagulant EDTA PLASMA Plasma Plasma Plasma Plasma Plasma, EDTA anticoagulant Plasma Plasma Plasma Plasma Plasma Plasma   BKRES Not Detected Not Detected Not Detected Not Detected Not Detected Not Detected Not Detected BK Virus DNA Not Detected BK Virus DNA Not Detected BK Virus DNA Not Detected BK Virus DNA Not Detected BK Virus DNA Not Detected BK Virus DNA Not Detected BK Virus DNA Not Detected BK Virus DNA Not Detected BK Virus DNA Not Detected BK Virus DNA Not Detected BK Virus DNA Not Detected BK Virus DNA Not Detected BK Virus DNA Not Detected BK Virus DNA Not Detected BK Virus DNA Not Detected BK Virus DNA Not Detected BK Virus DNA Not Detected BK Virus DNA Not Detected BK Virus DNA Not Detected BK Virus DNA Not Detected BK Virus DNA Not Detected BK Virus DNA Not Detected BK Virus DNA Not Detected BK Virus DNA Not Detected BK Virus DNA Not Detected BK Virus DNA Not Detected       Parvovirus Testing  No lab results found.    Invalid input(s): PRVRES    Adenovirus Testing  No lab results found.    Invalid input(s): ADENAB, ADENOVIRUS, ADQT    Hepatitis B Testing     Recent Labs   Lab Test 03/15/23  0952 08/03/19  1853 08/02/19  1938   AUSAB 30.05 63.15*  --    HEPBANG Nonreactive Nonreactive Nonreactive   HBCM  --   --  Nonreactive     Was the last Hepatitis B E antigen positive?   No results found for: HBEAGN     Hepatitis C Antibody   Date Value Ref Range Status   03/15/2023 Nonreactive Nonreactive Final   08/02/2019 Nonreactive NR^Nonreactive Final     Comment:     Assay performance characteristics have not been established for  newborns,   infants, and children     12/26/2013 Negative NEG Final       CMV Antibody IgG   Date Value Ref Range Status   08/02/2019 >8.0 (H) 0.0 - 0.8 AI Final     Comment:     Positive  Antibody index (AI) values reflect qualitative changes in antibody   concentration that cannot be directly associated with clinical condition or   disease state.       CMV Antibody IgM   Date Value Ref Range Status   08/02/2019 0.2 0.0 - 0.8 AI Final     Comment:     Negative  Results from any one IgM assay should not be used as a sole determinant of a   current or recent infection. Because an IgM test can yield false positive   results and low-level IgM antibody may persist for more than 12 months post   infection, reliance on a single test result could be misleading.   Acute infection is best diagnosed by demonstrating the conversion of IgG from   negative to positive. If an acute infection is suspected, consider obtaining a   new specimen and submit for both IgG and IgM testing in two or more weeks.  Antibody index (AI) values reflect qualitative changes in antibody   concentration that cannot be directly associated with clinical condition or   disease state.       CMV IgG Antibody   Date Value Ref Range Status   12/26/2013 >10.00  Positive for anti-CMV IgG U/mL Final     Varicella Zoster Antibody IgG   Date Value Ref Range Status   03/15/2023 No detectable antibody.  Final     EBV Capsid Antibody IgG   Date Value Ref Range Status   08/02/2019 3.7 (H) 0.0 - 0.8 AI Final     Comment:     Positive, suggests recent or past exposure  Antibody index (AI) values reflect qualitative changes in antibody   concentration that cannot be directly associated with clinical condition or   disease state.       EBV VCA IgG Antibody   Date Value Ref Range Status   12/26/2013 57.80 U/mL Final     Comment:     Positive, suggests immunologic exposure.     EBV Capsid Antibody IgM   Date Value Ref Range Status   08/02/2019 <0.2 0.0 - 0.8 AI Final      Comment:     No detectable antibody.  Antibody index (AI) values reflect qualitative changes in antibody   concentration that cannot be directly associated with clinical condition or   disease state.         No components found for: [id number]    Last Pathology Report   Case Report   Date Value Ref Range Status   04/28/2023   Final    Surgical Pathology Report                         Case: [id number]                                  Authorizing Provider:  Melissa Jones MD   Collected:           04/28/2023 09:16 AM          Ordering Location:     [facility]                 Received:            04/28/2023 09:28 AM          Pathologist:           Aisha Walters MD                                                                           Intraop:               Emerald Aquino MD                                                   Specimens:   A) - Parathyroid, Superior, Right, Right superior parathyroid                                       B) - Parathyroid, Inferior, Right                                                                   D) - Parathyroid, Superior, Left, Left, superior parathyroid                                        E) - Parathyroid, Inferior, Left, parathyroid, Left Inferior                                Clinical Information   Date Value Ref Range Status   04/28/2023   Final    From [facility] electronic medical record; a 32 year old with history of tertiary hyperparathyrodism. 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       Comment:     This is an appended report. These results have been appended to a previously preliminary verified report.     Final Diagnosis   Date Value Ref Range Status   04/28/2023   Final    A. PARATHYROID, RIGHT SUPERIOR, EXCISION:  - Hypercellular parathyroid tissue, 300 mg    B.   PARATHYROID, RIGHT INFERIOR, EXCISION:  - Hypercellular parathyroid tissue, 100 mg    D.  PARATHYROID, LEFT SUPERIOR , EXCISION:  - Hypercellular parathyroid tissue, 200 mg    E.  PARATHYROID, LEFT INFERIOR  EXCISION:  - Hypercellular parathyroid tissue, 200 mg       Comment:     This is an appended report. These results have been appended to a previously preliminary verified report.       Imaging:  Results for orders placed or performed during the hospital encounter of 23   Maternal Fetal US OB Limited Single/Multiple    Narrative            Limited  ---------------------------------------------------------------------------------------------------------  Pat. Name: LIZZETTE PALMER       Study Date:  2023 7:30am  Pat. NO:  [id number]        Referring  MD: DAYANNA GRANT  Site:  [facility]       Sonographer: Karuna Carter RDMS  :  [id number]        Age:   32  ---------------------------------------------------------------------------------------------------------    INDICATION  ---------------------------------------------------------------------------------------------------------  Fetal growth restriction. Inpatient due to fever and urinary tract infection. Maternal kidney transplant. Hypothyroidism. Bicornuate uterus.      METHOD  ---------------------------------------------------------------------------------------------------------  [facility] ANTEPARTUM inpatient exam. Transabdominal ultrasound examination. View: Sufficient      PREGNANCY  ---------------------------------------------------------------------------------------------------------  Yates pregnancy. Number of fetuses: 1      DATING  ---------------------------------------------------------------------------------------------------------                                           Date                                Details                                                                                      Gest. age                      KRISSY  LMP                                   2022                                                                                                                       28 w + 5 d                     2023  Prior assessment               2023                         GA: 8 w + 4 d                                                                            28 w + 3 d                     2023  Assigned dating                  Dating performed on 2023, based on the LMP                                                              28 w + 5 d                     2023      GENERAL EVALUATION  ---------------------------------------------------------------------------------------------------------  Cardiac activity present.  bpm.  Fetal movements visualized.  Presentation cephalic.  Placenta  Anterior  Umbilical cord previously studied, 3 vessel cord.  Amniotic fluid Amount of AF: normal. MVP 6.7 cm.      FETAL DOPPLER  ---------------------------------------------------------------------------------------------------------  Umbilical Artery: normal. Sampling site: midcord  PI                                            0.81                                                  13%         Adam  HR                                          136                     bpm      RECOMMENDATION  ---------------------------------------------------------------------------------------------------------  We discussed the findings on today's ultrasound with the patient.    Continue  surveillance with weekly NST, amniotic fluid and UA Doppler assessment.    Thank-you for the opportunity to participate in the care of this patient. If you have questions regarding today's evaluation or if we can be of further service, please contact the  [facility].    **Fetal anomalies may be present but not detected**        Impression     IMPRESSION  ---------------------------------------------------------------------------------------------------------  1) Yates intrauterine pregnancy at 28w 5d gestational age.  2) The amniotic fluid volume appeared normal.  3) The UA Doppler is within normal limits.       US Renal Transplant with Doppler    Narrative    EXAMINATION: US RENAL TRANSPLANT,  7/5/2023 6:08 AM     COMPARISON: Renal transplant ultrasound 6/11/2023    HISTORY: kidney transplant, s/p R PNT. Now feeling pressure at PNT  site and spontaneously beginning to void via urethra    TECHNIQUE:  Grey-scale, color Doppler and spectral flow analysis.    FINDINGS:  The transplant kidney is located right lower quadrant, and measures  13.8 cm (previously measuring 14.9 cm). Parenchyma is of normal  thickness and echogenicity. No focal lesions. No hydronephrosis. No  perinephric fluid collection. Percutaneous nephrostomy tube in place.    Renal artery flow:   110 cm/sec peak systolic at hilum.  121 cm/sec peak systolic at anastomosis.  Arcuate artery resistive indices (upper to lower): 0.69, 0.64, 0.69    Renal Vein Flow:  Patent at the hilum and anastomosis.     Iliac artery flow:  91 cm/sec peak systolic above anastomosis.  106 cm/sec peak systolic below anastomosis.    Iliac vein flow:  Patent above and below the anastomosis.      Impression    IMPRESSION: Improvement in previously identified hydronephrosis.  Percutaneous nephrostomy tube in place.    I have personally reviewed the examination and initial interpretation  and I agree with the findings.    MARIANA FREY MD         SYSTEM ID:  [id number]     *Note: Due to a large number of results and/or encounters for the requested time period, some results have not been displayed. A complete set of results can be found in Results Review.       COLNI JUAN MD

## 2023-07-12 NOTE — NURSING NOTE
Is the patient currently in the state of [place]? YES    Visit mode:VIDEO    If the visit is dropped, the patient can be reconnected by: VIDEO VISIT: Send to e-mail at: [email or web address]    Will anyone else be joining the visit? NO      How would you like to obtain your AVS? MyChart    Are changes needed to the allergy or medication list? YES: Pt taking lactaid, OTC    Reason for visit: ONDINA Bates VF

## 2023-07-12 NOTE — PROGRESS NOTES
Virtual Visit Details    Type of service:  Video Visit   Video Start Time: 3:00 PM  Video End Time:3:30pm    Originating Location (pt. Location): Home    Distant Location (provider location):  Off-site  Platform used for Video Visit: [facility]  Transplant Infectious Disease Clinic Note:  Virtual ID follow up.      Patient:  Mona Wagner, Date of birth 1990, Medical record number [id number]  Date of Visit:  07/12/2023           Assessment and Recommendations:   Recommendations:  -The pt is on secondary prophy for UTI with keflex. However, she feels like she gets hives and itching with keflex but is willing to try it again once more tonight. If she were to have any further pruritus or hives then she will stop the keflex, if not she will continue the medication.  -if she were to have itchiness then there are two alternatives, first is to monitor off abx. There is no strong data that supports secondary prophylaxis (source: https://doi.org/10.1002/81302729.XV128283.pub2). The second option would be a prophylaxis with an alternative abx such as nitrofurantoin. This medication is safe in pregnancy until the pregnancy patients are at term (week 38 to 42). After taking about risks and benefits of prophy, she stated that if she were to have itching or hives with keflex then she will stop the abx and hold off on switching to nitrofurantoin and be monitored closely. I will also discuss this plan with her OB doctor as well.   -will recommend close monitoring with UA/culture at least once a trimester and more frequently if she were to have symptoms and will treat all urine cultures regardless of symptoms while she is pregnant.   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 She is on Azathioprine, and tacro who is here for follow up for E. Coli bacteremia. Of note, she is now pregnant.     E Coli bacteremia and sepsis from an UTI on 6/30/23  -Fever, chills and some dysuria and UTI dx at clinic on 6/30  -culture growing E coli and blood cultures with E coli 1/2  -Ecoli susceptibilities are R to ampicllin, cipro and levo, I to uansyn   -s/p ceftriaxone 2g until 7/8/23 for 7d of abx and now on keflex. She started the keflex last night.      Compressive urethral obstruction secondary pregnancy, s/p nephro tube   She has a nephrostomy tube (placed on 6/9) while pregnancy as the baby is compressing the ureter and will be removed after pregnancy.     Jasen De La Torre MD. Pager [phone]         History of the Infectious Disease lllness:   Mona Wagner is a 29 year old with PMH of Kidney transplant Aug 2019 likely from  IgA nephropathy with c/b hydronephrosis 2/2 ureteral stenosis with nephrostomy tube on 6/9 who is on Azathioprine, and tacro who is here for follow up for E. Coli bacteremia. Of note, she is now pregnant who is 29 weeks this friday. She was on ceftraixone for 7d with last date on 7/8/23. She was placed on prophy cephalosporin and is here for ID follow up.     The pt is seen over the video. She states that she has allergies feeling with some itchy throat. She did start taking keflex yesterday and thinks that she had some hives on his elbows which resolved after some topical steroid cream. She no longer has fevers or chills, no headache, no ear pain, some sob, no cough, n/v/d, no abd pain, no pain over the transplanted kidney. She has a nephrostomy tube while pregnancy as the baby is compressing the ureter and will be removed after pregnancy. She states that since transplant she had one UTI. She states that every time she has a UTI, its due to a urological procedure.     Born in [place] and works as a pharmacy tech. Having a baby. No travel outside the [place]. She has no  pets. No undercooked meats. Lives with her . No recent outdoor activities. No hunting, fishing, gardening.    Transplants:  8/3/2019 (Kidney); Postoperative day:  1439.  Coordinator Jennifer Trejo    Review of Systems:  CONSTITUTIONAL:  No fevers or chills. No night sweats.  EYES: negative for icterus or acute vision changes.   ENT:  negative for hearing loss, tinnitus or sore throat  RESPIRATORY:  negative for cough, sputum, dyspnea  CARDIOVASCULAR:  negative for chest pain, heart palpitations  GASTROINTESTINAL:  negative for nausea, vomiting, diarrhea or constipation  GENITOURINARY:  negative for dysuria or hematuria.  HEME:  No easy bruising or bleeding  INTEGUMENT:  negative for rash or pruritus  NEURO:  Negative for headache or tremor.    Past Medical History:   Diagnosis Date     Anemia in chronic kidney disease      Bacteremia 7/5/2023     History of bacterial endocarditis      History of blood transfusion      History of DVT (deep vein thrombosis) 2014     History of seizure 2014     History of subarachnoid hemorrhage      Hydronephrosis      Hypothyroidism      Kidney transplanted 08/03/2019    DCD DDKT. Induction with thymo 6mg/kg.     Orthostatic hypotension      FATOUMATA (obstructive sleep apnea)      Pyelonephritis of transplanted kidney 01/23/2020     Secondary renal hyperparathyroidism (H)      Urinary tract infection        Past Surgical History:   Procedure Laterality Date     BENCH KIDNEY N/A 8/3/2019    Procedure: BACKBENCH PREPARATION, ALLOGRAFT, KIDNEY;  Surgeon: Dorian Johnson MD;  Location: [facility]     COMBINED CYSTOSCOPY, RETROGRADES, EXCHANGE STENT URETER(S) Right 11/23/2020    Procedure: CYSTOSCOPY, CYSOTGRAM, WITH RETROGRADE PYELOGRAM, ureteroscopy,  AND URETERAL STENT;  Surgeon: Wally Britt MD;  Location: [facility]     COMBINED CYSTOSCOPY, RETROGRADES, URETEROSCOPY, LASER HOLMIUM LITHOTRIPSY URETER(S), INSERT STENT N/A 12/6/2019    Procedure: CYSTOURETEROSCOPY, WITH RETROGRADE  PYELOGRAM of transplant kidney, STENT INSERTION, Laser on standby;  Surgeon: Wally Britt MD;  Location[facility]     CREATE FISTULA ARTERIOVENOUS UPPER EXTREMITY  2014    Procedure: CREATE FISTULA ARTERIOVENOUS UPPER EXTREMITY;  Left Wrist Arteriovenous Fistula Placement;  Surgeon: Shashi Castro MD;  Location: [facility]     CREATE FISTULA ARTERIOVENOUS UPPER EXTREMITY       CYSTOSCOPY, RETROGRADES, INSERT STENT URETER(S), COMBINED N/A 2020    Procedure: CYSTOSCOPY, WITH RETROGRADE PYELOGRAM, CYSTOGRAM AND URETERAL STENT INSERTION - TRANSPLANT KIDNEY;  Surgeon: Wally Britt MD;  [facility]     IR CEREBRAL ANGIOGRAM  2014     IR NEPHROSTOMY TUBE PLACEMENT RIGHT  2023     PARATHYROIDECTOMY Bilateral 2023    Procedure: Bilateral Resection of 3 and 1/2 parathyroid glands;  Surgeon: Melissa Jones MD;  Location: [facility]     PERCUTANEOUS BIOPSY KIDNEY Right 2019    Procedure: Right Kidney Biopsy;  Surgeon: Deny Rios MD;  [facility]     RASTA/DIALYSIS CATHETER  12/10/2013          TRANSPLANT KIDNEY RECIPIENT  DONOR N/A 8/3/2019    Procedure: TRANSPLANT, KIDNEY, RECIPIENT,  DONOR with Ureteral Stent Placement;  Surgeon: Dorian Johnson MD;  Location: [facility]       Family History   Problem Relation Age of Onset     Kidney Disease Mother      Kidney failure Mother      Coronary Artery Disease Father      Cerebrovascular Disease Father      Heart Disease Father      Sleep Apnea Father      Hypertension Sister      Diabetes Sister      Cerebrovascular Disease Sister      Kidney Disease Sister      Breast Cancer No family hx of      Cancer - colorectal No family hx of      Ovarian Cancer No family hx of      Prostate Cancer No family hx of      Other Cancer No family hx of      Asthma No family hx of      Anesthesia Reaction No family hx of      Deep Vein Thrombosis (DVT) No family hx of      Melanoma No family hx of      Skin Cancer No family  hx of      Thrombosis No family hx of        Social History     Social History Narrative    Date of Service:5/15/2013     Name: Mona VALDOVINOS (Month, Day, Year of birth): 1990     MRN: [id number]     New Patient: Yes    Preferred Language: English     Needed: No    County of Residence: [place]    Marital Status:     Household size: 8    Number of Dependents:0     Pregnant: 0    Average Monthly Income: $ 600    Citizenship Status: Citizen    Gave Pt [employer] and [employer] applications     Social History     Tobacco Use     Smoking status: Never     Smokeless tobacco: Never   Vaping Use     Vaping Use: Never used   Substance Use Topics     Alcohol use: No     Alcohol/week: 0.0 standard drinks of alcohol     Drug use: No       Immunization History   Administered Date(s) Administered     COVID-19 Monovalent 18+ (Moderna) 2021, 2021, 2021     COVID-19 Monovalent Booster 18+ (Moderna) 2022     DTAP (<7y) 1997     Flu, Unspecified 2018, 2020     HEPA 2008, 2009     HEPATITIS A (PEDS 12M-18Y) 2008     HPV 2008, 2009, 06/10/2010     HPV Quadrivalent 2008, 2009, 06/10/2010     HepA-Peds, Unspecified 2009     HepB 1997, 2005, 2005     Hepatitis B (Peds <19Y) 1997, 2005, 2005     Influenza (IIV3) PF 2008, 2013, 10/15/2015, 2016, 2017, 2018     Influenza Vaccine >6 months (Alfuria,Fluzone) 2014, 10/07/2019, 2020, 10/13/2021, 09/15/2022     Influenza Vaccine IM Ages 6-35 Months 4 Valent (PF) 2022     Influenza Vaccine Im 4yrs+ 4 Valent CCIIV4 2020     MMR 2005     Mantoux Tuberculin Skin Test 2013     Meningococcal (Menomune ) 2009     Meningococcal ACWY (Menactra ) 2009     OPV, trivalent, live 1997     Pneumo Conj 13-V (&after) 2021     Pneumococcal 23 valent 2013, 2018      Poliovirus, inactivated (IPV) 1997     TD,PF 7+ (Tenivac) 2005     TDAP (Adacel,Boostrix) 2009     TDAP Vaccine (Adacel) 2009     TDAP Vaccine (Boostrix) 2022     Varicella 2008, 2009       Patient Active Problem List   Diagnosis     DVT of upper extremity (deep vein thrombosis) (H)     Seizure disorder (H)     Acquired hypothyroidism     Increased prolactin level     Aftercare following organ transplant     Immunosuppressed status (H)     Kidney replaced by transplant     Anxiety     Vitamin D deficiency     CKD (chronic kidney disease) stage 3, GFR 30-59 ml/min (H)     Bicornate uterus     Ureteral stenosis     Orthostatic hypotension     Tertiary hyperparathyroidism (H)     High-risk pregnancy in second trimester     Dehydration     Stage 3a chronic kidney disease (H)     Anemia of chronic renal failure     Elevated blood pressure affecting pregnancy in second trimester, antepartum      contractions     Encounter for triage in pregnant patient     Bacteremia       No outpatient medications have been marked as taking for the 23 encounter (Appointment) with Jasen De La Torre MD.       Allergies   Allergen Reactions     Contrast Dye Rash     CT contrast allergy 19 rash over eyes. Need to have pre medication before a CT WITH CONTRAST      Diatrizoate Rash     CT contrast allergy 19 rash over eyes. Need to have pre medication before a CT WITH CONTRAST      Lisinopril Swelling     angioedema     Nitrous Oxide Other (See Comments)     Sense of doom     Chlorhexidine Rash     Rash at site     Sulfa Antibiotics Rash     Muscle stiffness of neck     Dapsone      Methemoglobinemia     Furosemide Other (See Comments)     Skin flushing     Metrogel [Metronidazole]      Hives diffusely on body after vaginal administration.     Azithromycin Dizziness and Rash     Cefuroxime Rash              Physical Exam:   Vitals were reviewed.  All vitals stable  LMP  12/16/2022   Wt Readings from Last 4 Encounters:   07/04/23 78.9 kg (174 lb)   06/29/23 77.7 kg (171 lb 6.4 oz)   06/20/23 77.7 kg (171 lb 3.2 oz)   06/19/23 78 kg (172 lb)       Exam:  GENERAL: well-developed, well-nourished, alert, oriented, in no acute distress over video.  HEAD: Head is normocephalic, atraumatic   EYES: Eyes have anicteric sclerae.    NEUROLOGIC: Grossly nonfocal.         Laboratory Data:     Absolute CD4   Date Value Ref Range Status   07/08/2020 201 (L) 441 - 2,156 cells/uL Final   05/06/2020 189 (L) 441 - 2,156 cells/uL Final   02/06/2020 149 (L) 441 - 2,156 cells/uL Final       Inflammatory Markers    Recent Labs   Lab Test 01/23/20  1301 08/05/19  0532 02/12/19  1212 01/19/19  0600 01/18/19  2219   SED 29*  --   --  116* 123*   CRP 26.0*  --  1.4* 8.3* 9.6*   G6PD  --  13.6  --   --   --        Immune Globulin Studies   No lab results found.    Metabolic Studies    Recent Labs   Lab Test 07/10/23  1028 07/07/23  1014 07/04/23  0817 07/03/23  0754 07/02/23  1924 07/02/23  1704 06/29/23  1300 06/16/23  0723 06/15/23  1009 08/07/20  0915 08/04/20  0905 09/03/19  0944 09/02/19  0946 08/02/19  1938 04/26/19  1050   * 136 134*   < >  --    < >  --    < > 133*   < > 139   < > 141   < > 135   POTASSIUM 5.5* 5.1 5.0   < >  --    < >  --    < > 4.8   < > 4.3   < > 4.5   < > 4.8   CHLORIDE 105 107 104   < >  --    < >  --    < > 100   < > 111*   < > 114*  114*   < > 95   CO2 20* 19* 16*   < >  --    < >  --    < > 20*   < > 23   < > 20*   < > 31   ANIONGAP 10 10 14   < >  --    < >  --    < > 13   < > 5   < > 7   < > 9   BUN 27.1* 25.3* 22.2*   < >  --    < >  --    < > 27.6*   < > 21   < > 24*   < > 78*   CR 1.49* 1.36* 1.28*   < >  --    < >  --    < > 1.25*   < > 1.00   < > 1.50*   < > 14.00*   [id number]  --   --   --   --   --   --   --   --   --   --   --   --  1.50*   < >  --    GFRESTIMATED 48* 53* 58*   < >  --    < >  --    < > 59*   < > 77   < > 42*   < > 3*   GLC 84 87 83   < >  --    <  >  --    < > 138*   < > 79   < > 88   < > 106*   SHAMIR 9.3 9.5 9.0   < >  --    < >  --    < > 9.6   < > 9.4   < > 10.7*   < > 9.4   PHOS  --   --   --   --   --   --   --   --  3.6   < >  --    < > 2.1*   < >  --    MAG  --   --   --   --   --   --  2.0   < > 1.3*   < >  --    < > 1.3*   < >  --    URIC  --   --   --   --   --   --   --   --   --   --  7.2*   < >  --   --   --    LACT  --   --   --   --  1.0  --   --   --   --   --   --    < >  --    < >  --    CKT  --   --   --   --   --   --   --   --   --   --   --   --   --   --  47    < > = values in this interval not displayed.       Hepatic Studies    Recent Labs   Lab Test 07/10/23  1028 07/04/23  0817 07/03/23  0754 06/22/22  0639 08/04/20  0905 02/03/20  1029 01/24/20  1439 01/06/19  1350 12/29/16  1338   BILITOTAL 0.6 0.5 0.7   < > 0.7   < >  --    < > 1.2   DBIL  --   --   --   --  <0.1   < >  --   --   --    ALKPHOS 117* 96 105*   < > 185*   < >  --    < > 105   PROTTOTAL 7.5 6.8 7.3   < > 7.9   < >  --    < > 8.3   ALBUMIN 3.6 3.3* 3.4*   < > 4.0   < >  --    < > 4.4   AST 21 13 14   < > 11   < >  --    < > 8   ALT 13 9 10   < > 19   < >  --    < > 16   LDH  --   --   --   --   --   --  207  --  185    < > = values in this interval not displayed.       Pancreatitis testing    Recent Labs   Lab Test 06/22/22  0639 08/20/21  1020 08/07/20  0915   LIPASE 39  --   --    TRIG  --  120 123       Lipid testing    Recent Labs   Lab Test 08/20/21  1020 08/07/20  0915 02/03/20  1029   CHOL 190 162 166   HDL 38* 39* 41*    98 100*   TRIG 120 123 125       Gout Labs      Recent Labs   Lab Test 08/04/20  0905 02/03/20  1029   URIC 7.2* 6.0       Hematology Studies   Recent Labs   Lab Test 07/10/23  1028 07/07/23  1014 07/04/23  0817 07/03/23  0754 07/02/23  1704 06/29/23  1028 03/03/21  0912 02/02/21  0920 01/27/20  0900 01/26/20  0627 09/03/19  0944 09/02/19  0946   WBC 6.8 7.3 11.0 11.4*   < > 9.6   < > 6.3   < > 4.2   < > 4.4   04313  --   --   --   --    --   --   --   --   --   --   --  4.4   ANEU  --   --   --   --   --   --   --  4.3  --  3.1   < >  --    ANEUTAUTO  --  5.9  --   --   --  7.9   < >  --   --   --   --  3.6   ALYM  --   --   --   --   --   --   --  1.2  --  0.5*   < >  --    ALYMPAUTO  --  0.6*  --   --   --  0.9   < >  --   --   --   --  0.3*   YASIR  --   --   --   --   --   --   --  0.6  --  0.4   < >  --    AMONOAUTO  --  0.5  --   --   --  0.5   < >  --   --   --   --  0.3   AEOS  --   --   --   --   --   --   --  0.1  --  0.0   < >  --    AEOSAUTO  --  0.2  --   --   --  0.1   < >  --   --   --   --   --    ABSBASO  --  0.0  --   --   --  0.0   < >  --   --   --   --  0.0   HGB 9.1* 8.3* 8.5* 8.4*   < > 9.4*   < > 12.7   < > 8.2*   < > 7.6*   60419  --   --   --   --   --   --   --   --   --   --   --  7.6*   HCT 28.5* 25.7* 27.0* 26.6*   < > 29.6*   < > 39.9   < > 26.3*   < > 23.7*    249 199 188   < > 228   < > 237   < > 118*   < > 274   74627  --   --   --   --   --   --   --   --   --   --   --  274    < > = values in this interval not displayed.       Clotting Studies    Recent Labs   Lab Test 06/12/23  1003 06/09/23  0700 03/22/23  1200 08/30/19  2210   INR 1.06 0.99 1.02 0.95   PTT 31  --   --   --        Iron Testing    Recent Labs   Lab Test 07/10/23  1028 05/25/23  0919 05/22/23  1023 05/22/23  1022 05/03/23  0905 04/25/23  1035 08/02/22  1032 07/13/22  1220 03/16/17  0958 12/29/16  1338   IRON  --   --   --  80  --  49  --  51   < > 137   FEB  --   --   --  195*  --  181*  --  217*   < > 228*   IRONSAT  --   --   --  41  --  27  --  24   < > 60*   MONET  --   --  1,618*  --   --  1,770*  --  1,111*   < > 1,039*   MCV 96   < >  --   --    < > 93   < >  --    < > 96   FOLIC  --   --   --   --   --   --   --   --   --  45.6   B12  --   --   --   --   --   --   --   --   --  937    < > = values in this interval not displayed.       Markers  No lab results found.    Invalid input(s): FETOPROTEIN, SERUM, AFP    Autoimmune Testing    No lab results found.    Invalid input(s): ANCAB, PANCA, CANCA    Arterial Blood Gas Testing    Recent Labs   Lab Test 07/10/23  1028 06/13/23  0649 06/08/23  1009 06/06/23  1029 05/30/23  1025 05/22/23  1028 05/15/23  1011 04/01/22  1000 08/03/19  1047   PH 7.36  --   --  7.36 7.34* 7.38 7.37   < >  --    O2PER  --  21 21  --   --   --   --   --  40    < > = values in this interval not displayed.        Thyroid Studies     Recent Labs   Lab Test 07/07/23  1014 07/03/23  0754 06/16/23  0723 06/06/23  1029 05/15/23  1011 03/22/23  1200 02/28/23  0821 11/19/19  1217 08/21/19  1226   TSH 1.32 0.83 2.51 2.28 1.77   < > 0.37   < > 2.57   T4 1.14  --   --  1.00 0.94   < > 1.28   < > 1.05   T3  --   --   --   --   --   --  207*  --  97   FT3 2.0  --   --  2.2 2.0   < >  --   --   --     < > = values in this interval not displayed.       Urine Studies     Recent Labs   Lab Test 07/02/23  1916 06/30/23  1116 06/26/23  1037 06/11/23  1532 03/29/23  1257   URINEPH 5.5 6.5 7.0 7.5* 6.0   NITRITE Positive* Negative Negative Negative Negative   LEUKEST Large* Moderate* Negative Small* Negative   WBCU 13* 6* <1 27* 1       Medication levels    Recent Labs   Lab Test 07/10/23  1028 09/09/19  0910 09/03/19  0944   TACROL 8.3   < > 9.4   MPACID  --   --  3.39   MPAG  --   --  52.8    < > = values in this interval not displayed.       CSF testing   No lab results found.    Invalid input(s): CADAM, EVPCR, ENTPCR, ENTEROVIRUS    Microbiology:  Fungal testing  No lab results found.    Invalid input(s): HIFUN, FUNGL    Beta D Glucan levels (Fungitell assay)    No results found for: FGTL, FGTLI     Last Culture results   Culture   Date Value Ref Range Status   07/06/2023 No Growth  Final   07/06/2023 No Growth  Final   07/04/2023 No Growth  Final   07/04/2023 Positive on the 2nd day of incubation (A)  Final   07/04/2023 Staphylococcus capitis (AA)  Final     Comment:     1 of 2 bottles   07/03/2023 No Growth  Final   07/03/2023 No Growth   Final   07/02/2023 Positive on the 1st day of incubation (A)  Final   07/02/2023 Escherichia coli (AA)  Final     Comment:     1 of 2 bottles   07/02/2023 No Growth  Final   06/30/2023 >100,000 CFU/mL Escherichia coli (A)  Final   06/08/2023 10,000-50,000 CFU/mL Mixture of urogenital anita  Final   03/29/2023 <10,000 CFU/mL Mixture of urogenital anita  Final   03/15/2023 No Growth  Final   01/31/2022 No Growth  Final   07/16/2021 >100,000 CFU/mL Escherichia coli (A)  Final   01/18/2019 STREPTOCOCCUS VIRIDANS GROUP (A)  Final     Comment:     Streptococcus viridans group   01/17/2019 STREPTOCOCCUS VIRIDANS GROUP (A)  Final     Comment:     Streptococcus viridans group  Sent to referral lab for further identification       Culture Micro   Date Value Ref Range Status   02/26/2021 (A)  Final    50,000 to 100,000 colonies/mL  Streptococcus agalactiae sero group B  This organism is susceptible to ampicillin, penicillin, vancomycin and the cephalosporins.   If treatment is required AND your patient is allergic to penicillin, contact the   Microbiology Lab within 5 days to request susceptibility testing.  Group B Streptococcus may be significant in OB patients, however, it is part of the normal   urogenital anita.     02/26/2021   Final    <10,000 colonies/mL  mixed urogenital anita  Susceptibility testing not routinely done     02/09/2021 (A)  Final    10,000 to 50,000 colonies/mL  Streptococcus agalactiae sero group B  This organism is susceptible to ampicillin, penicillin, vancomycin and the cephalosporins.   If treatment is required AND your patient is allergic to penicillin, contact the   Microbiology Lab within 5 days to request susceptibility testing.  Testing unavailable due to 's backorder.  Group B Streptococcus may be significant in OB patients, however, it is part of the normal   urogenital anita.     02/09/2021   Final    <10,000 colonies/mL  mixed urogenital anita  Susceptibility testing not  routinely done     11/17/2020 No growth  Final   11/06/2020 (A)  Final    <10,000 colonies/mL  Streptococcus agalactiae sero group B  This organism is susceptible to ampicillin, penicillin, vancomycin and the cephalosporins.   If treatment is required AND your patient is allergic to penicillin, contact the   Microbiology Lab within 5 days to request susceptibility testing.     10/09/2020 No growth  Final   08/15/2020 (A)  Final    10,000 to 50,000 colonies/mL  Streptococcus agalactiae sero group B     08/15/2020   Final    This organism is susceptible to ampicillin, penicillin, vancomycin and the cephalosporins.   If treatment is required AND your patient is allergic to penicillin, contact the   Microbiology Lab within 5 days to request susceptibility testing.     08/11/2020 (A)  Final    <10,000 colonies/mL  Streptococcus agalactiae sero group B  This organism is susceptible to ampicillin, penicillin, vancomycin and the cephalosporins.   If treatment is required AND your patient is allergic to penicillin, contact the   Microbiology Lab within 5 days to request susceptibility testing.  Group B Streptococcus may be significant in OB patients, however, it is part of the normal   urogenital anita.     08/11/2020 <10,000 colonies/mL  urogenital antia    Final   08/11/2020 Susceptibility testing not routinely done  Final     Escherichia coli   Date Value Ref Range Status   07/02/2023 Detected (A) Not Detected Final     Comment:     Positive for Escherichia coli by Verigene multiplex nucleic acid test. Final identification and antimicrobial susceptibility testing will be verified by standard methods. Verigene test will not distinguish E. coli from Shigella species including Shigella dysenteriae, Shigella flexneri, Shigella boydii, and Shigella sonnei. Specimens containing Shigella species or E. coli will be reported as positive for E. coli.         Last checks of Clostridioides difficile testing  Recent Labs   Lab Test  10/02/20  1345 01/24/20  1850 08/19/19  1300 02/12/19  1234   CDBPCT Negative Negative Negative Negative       No components found for: AFBSTN    Syphilis Testing  Invalid input(s): [id number]    Tick Testing  No lab results found.    Invalid input(s): APHAGM    ASO Testing  Invalid input(s): [id number]    Quantiferon testing   Recent Labs   Lab Test 07/07/23  1014 06/29/23  1028   LYMPH 8 9       Infection Studies to assess Diarrhea  Recent Labs   Lab Test 10/04/20  0953 10/02/20  1345 01/24/20  1850   EPSTX1 Not Detected Canceled, Test credited Not Detected   EPSTX2 Not Detected Canceled, Test credited Not Detected   EPCAMP Detected, Abnormal Result* Canceled, Test credited Not Detected   EPSALM Not Detected Canceled, Test credited Not Detected   EPSHGL Not Detected Canceled, Test credited Not Detected   EPVIB Not Detected Canceled, Test credited Not Detected   EPROTA Not Detected Canceled, Test credited Not Detected   EPNORO Not Detected Canceled, Test credited Not Detected   EPYER Not Detected Canceled, Test credited Not Detected       Virology:  Coronavirus-19 testing    Recent Labs   Lab Test 11/20/20  1310 08/18/20  1525 08/15/20  0001   [id number] Not Detected Not Detected Not Detected   [id number] Nasopharyngeal Nasopharyngeal Nasopharyngeal       Respiratory virus (non-coronavirus-19) testing    Recent Labs   Lab Test 01/24/20  1132 01/23/20  1301   AFLU  --  Negative   IFLUA Not Detected  --    FLUAH1 Not Detected  --    [id number] Not Detected  --    FLUAH3 Not Detected  --    BFLU  --  Negative   IFLUB Not Detected  --    PIV1 Not Detected  --    PIV2 Not Detected  --    PIV3 Not Detected  --    PIV4 Not Detected  --    RSVA Not Detected  --    RSVB Not Detected  --    HMPV Not Detected  --    RHINEV Not Detected  --    ADENOV Not Detected  --    CORONA Not Detected  --        CMV viral loads    CMV Quant IU/mL   Date Value Ref Range Status   10/05/2020 CMV DNA Not Detected CMVND^CMV DNA Not Detected [IU]/mL  Final     Comment:     Mutations within the highly conserved regions of the viral genome covered by   the RAFAELA AmpliPrep/RAFAELA TaqMan CMV Test primers and/or probes have been   identified and may result in under-quantitation of or failure to detect the   virus.  Supplemental testing methods should be used for testing when this is   suspected.  The RAFAELA AmpliPrep/RAFAELA TaqMan CMV Test is an FDA-approved in vitro nucleic   acid amplification test for the quantitation of cytomegalovirus DNA in human   plasma (EDTA plasma) using the RAFAELA AmpliPrep Instrument for automated viral   nucleic acid extraction and the [employer] TaqMan Analyzer or [employer] TaqMan for   automated Real Time amplification and detection of the viral nucleic acid   target.  Titer results are reported in International Units/mL (IU/mL using 1st WHO   International standard for Human Cytomegalovirus for Nucleic Acid   Amplification based assays. The conversion factor between CMV DNA copis/mL (as   defined by the Roche RAFAELA TaqMan CMV test) and International Units is the   CMV DNA concentration in IU/mL x 1.1 copies/IU = CMV DNA in copies/mL.  This assay has received FDA approval for the testing of human plasma only. The   [facility] at the [facility], [place], has validated the performance characteristics of the   Roche CMV assay for plasma, bronchial alveolar lavage/wash and urine.     01/23/2020 CMV DNA Not Detected CMVND^CMV DNA Not Detected [IU]/mL Final     Comment:     Mutations within the highly conserved regions of the viral genome covered by   the RAFAELA AmpliPrep/RAFAELA TaqMan CMV Test primers and/or probes have been   identified and may result in under-quantitation of or failure to detect the   virus.  Supplemental testing methods should be used for testing when this is   suspected.  The RAFAELA AmpliPrep/RAFAELA TaqMan CMV Test is an FDA-approved in vitro nucleic   acid amplification test  for the quantitation of cytomegalovirus DNA in human   plasma (EDTA plasma) using the RAFAELA AmpliPrep Instrument for automated viral   nucleic acid extraction and the [employer] TaqMan Analyzer or [employer] TaqMan for   automated Real Time amplification and detection of the viral nucleic acid   target.  Titer results are reported in International Units/mL (IU/mL using 1st WHO   International standard for Human Cytomegalovirus for Nucleic Acid   Amplification based assays. The conversion factor between CMV DNA copis/mL (as   defined by the Roche RAFAELA TaqMan CMV test) and International Units is the   CMV DNA concentration in IU/mL x 1.1 copies/IU = CMV DNA in copies/mL.  This assay has received FDA approval for the testing of human plasma only. The   [facility] at the [facility], [place], has validated the performance characteristics of the   Roche CMV assay for plasma, bronchial alveolar lavage/wash and urine.       Log IU/mL of CMVQNT   Date Value Ref Range Status   10/05/2020 Not Calculated <2.1 [Log_IU]/mL Final   01/23/2020 Not Calculated <2.1 [Log_IU]/mL Final       CMV resistance testing  No lab results found.  No results found for: CMVCID, CMVFOS, CMVGAN    No results found for: H6RES    EBV DNA Copies/mL   Date Value Ref Range Status   01/25/2020 EBV DNA Not Detected EBVNEG^EBV DNA Not Detected [Copies]/mL Final       BK viral loads   Recent Labs   Lab Test 04/01/22  1000 01/11/22  0941 12/21/21  0943 11/11/21  0921 10/13/21  0932 09/07/21  0910 08/02/21  0920 07/07/21  0920 06/02/21  0925 05/03/21  0902 04/06/21  0920 03/03/21  0912 02/02/21  0920 12/28/20  0910 12/01/20  0909 11/03/20  0910 10/05/20  0911 09/28/20  0920 09/08/20  0930 08/04/20  0905 07/08/20  0918 06/01/20  0856 04/29/20  0850 04/06/20  0837 03/02/20  0858 02/03/20  1029 01/06/20  0840 12/03/19  1112 12/02/19  0850 11/04/19  0850 10/07/19  0840 09/17/19  0625 09/03/19  0944   SHAGUFTA  --    --   --   --   --   --   --  Plasma Plasma Plasma Plasma Plasma Plasma, EDTA anticoagulant Plasma Plasma Plasma Plasma Plasma EDTA PLASMA Plasma Plasma, EDTA anticoagulant EDTA PLASMA Plasma Plasma Plasma Plasma Plasma, EDTA anticoagulant Plasma Plasma Plasma Plasma Plasma Plasma   BKRES Not Detected Not Detected Not Detected Not Detected Not Detected Not Detected Not Detected BK Virus DNA Not Detected BK Virus DNA Not Detected BK Virus DNA Not Detected BK Virus DNA Not Detected BK Virus DNA Not Detected BK Virus DNA Not Detected BK Virus DNA Not Detected BK Virus DNA Not Detected BK Virus DNA Not Detected BK Virus DNA Not Detected BK Virus DNA Not Detected BK Virus DNA Not Detected BK Virus DNA Not Detected BK Virus DNA Not Detected BK Virus DNA Not Detected BK Virus DNA Not Detected BK Virus DNA Not Detected BK Virus DNA Not Detected BK Virus DNA Not Detected BK Virus DNA Not Detected BK Virus DNA Not Detected BK Virus DNA Not Detected BK Virus DNA Not Detected BK Virus DNA Not Detected BK Virus DNA Not Detected BK Virus DNA Not Detected       Parvovirus Testing  No lab results found.    Invalid input(s): PRVRES    Adenovirus Testing  No lab results found.    Invalid input(s): ADENAB, ADENOVIRUS, ADQT    Hepatitis B Testing     Recent Labs   Lab Test 03/15/23  0952 08/03/19  1853 08/02/19  1938   AUSAB 30.05 63.15*  --    HEPBANG Nonreactive Nonreactive Nonreactive   HBCM  --   --  Nonreactive     Was the last Hepatitis B E antigen positive?   No results found for: HBEAGN     Hepatitis C Antibody   Date Value Ref Range Status   03/15/2023 Nonreactive Nonreactive Final   08/02/2019 Nonreactive NR^Nonreactive Final     Comment:     Assay performance characteristics have not been established for newborns,   infants, and children     12/26/2013 Negative NEG Final       CMV Antibody IgG   Date Value Ref Range Status   08/02/2019 >8.0 (H) 0.0 - 0.8 AI Final     Comment:     Positive  Antibody index (AI) values reflect  qualitative changes in antibody   concentration that cannot be directly associated with clinical condition or   disease state.       CMV Antibody IgM   Date Value Ref Range Status   08/02/2019 0.2 0.0 - 0.8 AI Final     Comment:     Negative  Results from any one IgM assay should not be used as a sole determinant of a   current or recent infection. Because an IgM test can yield false positive   results and low-level IgM antibody may persist for more than 12 months post   infection, reliance on a single test result could be misleading.   Acute infection is best diagnosed by demonstrating the conversion of IgG from   negative to positive. If an acute infection is suspected, consider obtaining a   new specimen and submit for both IgG and IgM testing in two or more weeks.  Antibody index (AI) values reflect qualitative changes in antibody   concentration that cannot be directly associated with clinical condition or   disease state.       CMV IgG Antibody   Date Value Ref Range Status   12/26/2013 >10.00  Positive for anti-CMV IgG U/mL Final     Varicella Zoster Antibody IgG   Date Value Ref Range Status   03/15/2023 No detectable antibody.  Final     EBV Capsid Antibody IgG   Date Value Ref Range Status   08/02/2019 3.7 (H) 0.0 - 0.8 AI Final     Comment:     Positive, suggests recent or past exposure  Antibody index (AI) values reflect qualitative changes in antibody   concentration that cannot be directly associated with clinical condition or   disease state.       EBV VCA IgG Antibody   Date Value Ref Range Status   12/26/2013 57.80 U/mL Final     Comment:     Positive, suggests immunologic exposure.     EBV Capsid Antibody IgM   Date Value Ref Range Status   08/02/2019 <0.2 0.0 - 0.8 AI Final     Comment:     No detectable antibody.  Antibody index (AI) values reflect qualitative changes in antibody   concentration that cannot be directly associated with clinical condition or   disease state.         No components  found for: [id number]    Last Pathology Report   Case Report   Date Value Ref Range Status   04/28/2023   Final    Surgical Pathology Report                         Case: [id number]                                  Authorizing Provider:  Melissa Jones MD   Collected:           04/28/2023 09:16 AM          Ordering Location:     [facility]                 Received:            04/28/2023 09:28 AM          Pathologist:           Aisha Walters MD                                                                           Intraop:               Emerald Aquino MD                                                   Specimens:   A) - Parathyroid, Superior, Right, Right superior parathyroid                                       B) - Parathyroid, Inferior, Right                                                                   D) - Parathyroid, Superior, Left, Left, superior parathyroid                                        E) - Parathyroid, Inferior, Left, parathyroid, Left Inferior                                Clinical Information   Date Value Ref Range Status   04/28/2023   Final    From [facility] electronic medical record; a 32 year old with history of tertiary hyperparathyrodism. 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       Comment:     This is an appended report. These results have been appended to a previously preliminary verified report.     Final Diagnosis   Date Value Ref Range Status   04/28/2023   Final    A. PARATHYROID, RIGHT SUPERIOR, EXCISION:  - Hypercellular parathyroid tissue, 300 mg    B.  PARATHYROID, RIGHT INFERIOR, EXCISION:  - Hypercellular parathyroid tissue, 100 mg    D.  PARATHYROID, LEFT SUPERIOR , EXCISION:  - Hypercellular parathyroid tissue, 200 mg    E.  PARATHYROID, LEFT INFERIOR  EXCISION:  -  Hypercellular parathyroid tissue, 200 mg       Comment:     This is an appended report. These results have been appended to a previously preliminary verified report.       Imaging:  Results for orders placed or performed during the hospital encounter of 23   Maternal Fetal US OB Limited Single/Multiple    Narrative            Limited  ---------------------------------------------------------------------------------------------------------  Pat. Name: LIZZETTE PALMER       Study Date:  2023 7:30am  Pat. NO:  [id number]        Referring  MD: DAYANNA GRANT  Site:  [facility]       Sonographer: Karuna Carter RDMS  :  1990        Age:   32  ---------------------------------------------------------------------------------------------------------    INDICATION  ---------------------------------------------------------------------------------------------------------  Fetal growth restriction. Inpatient due to fever and urinary tract infection. Maternal kidney transplant. Hypothyroidism. Bicornuate uterus.      METHOD  ---------------------------------------------------------------------------------------------------------  [facility] ANTEPARTUM inpatient exam. Transabdominal ultrasound examination. View: Sufficient      PREGNANCY  ---------------------------------------------------------------------------------------------------------  Yates pregnancy. Number of fetuses: 1      DATING  ---------------------------------------------------------------------------------------------------------                                           Date                                Details                                                                                      Gest. age                      KRISSY  LMP                                  2022                                                                                                                       28 w + 5 d                     2023  Prior assessment                2023                         GA: 8 w + 4 d                                                                            28 w + 3 d                     2023  Assigned dating                  Dating performed on 2023, based on the LMP                                                              28 w + 5 d                     2023      GENERAL EVALUATION  ---------------------------------------------------------------------------------------------------------  Cardiac activity present.  bpm.  Fetal movements visualized.  Presentation cephalic.  Placenta  Anterior  Umbilical cord previously studied, 3 vessel cord.  Amniotic fluid Amount of AF: normal. MVP 6.7 cm.      FETAL DOPPLER  ---------------------------------------------------------------------------------------------------------  Umbilical Artery: normal. Sampling site: midcord  PI                                            0.81                                                  13%         Adam  HR                                          136                     bpm      RECOMMENDATION  ---------------------------------------------------------------------------------------------------------  We discussed the findings on today's ultrasound with the patient.    Continue  surveillance with weekly NST, amniotic fluid and UA Doppler assessment.    Thank-you for the opportunity to participate in the care of this patient. If you have questions regarding today's evaluation or if we can be of further service, please contact the  [facility].    **Fetal anomalies may be present but not detected**        Impression    IMPRESSION  ---------------------------------------------------------------------------------------------------------  1) Yates intrauterine pregnancy at 28w 5d gestational age.  2) The amniotic fluid volume appeared normal.  3) The UA Doppler is within normal limits.       US Renal  Transplant with Doppler    Narrative    EXAMINATION: US RENAL TRANSPLANT,  7/5/2023 6:08 AM     COMPARISON: Renal transplant ultrasound 6/11/2023    HISTORY: kidney transplant, s/p R PNT. Now feeling pressure at PNT  site and spontaneously beginning to void via urethra    TECHNIQUE:  Grey-scale, color Doppler and spectral flow analysis.    FINDINGS:  The transplant kidney is located right lower quadrant, and measures  13.8 cm (previously measuring 14.9 cm). Parenchyma is of normal  thickness and echogenicity. No focal lesions. No hydronephrosis. No  perinephric fluid collection. Percutaneous nephrostomy tube in place.    Renal artery flow:   110 cm/sec peak systolic at hilum.  121 cm/sec peak systolic at anastomosis.  Arcuate artery resistive indices (upper to lower): 0.69, 0.64, 0.69    Renal Vein Flow:  Patent at the hilum and anastomosis.     Iliac artery flow:  91 cm/sec peak systolic above anastomosis.  106 cm/sec peak systolic below anastomosis.    Iliac vein flow:  Patent above and below the anastomosis.      Impression    IMPRESSION: Improvement in previously identified hydronephrosis.  Percutaneous nephrostomy tube in place.    I have personally reviewed the examination and initial interpretation  and I agree with the findings.    MARIANA FREY MD         SYSTEM ID:  [id number]     *Note: Due to a large number of results and/or encounters for the requested time period, some results have not been displayed. A complete set of results can be found in Results Review.

## 2023-07-13 ENCOUNTER — HOSPITAL ENCOUNTER (OUTPATIENT)
Dept: ULTRASOUND IMAGING | Facility: CLINIC | Age: 33
Discharge: HOME | End: 2023-07-13
Attending: OBSTETRICS & GYNECOLOGY
Payer: MEDICARE

## 2023-07-13 ENCOUNTER — OFFICE VISIT (OUTPATIENT)
Dept: MATERNAL FETAL MEDICINE | Facility: CLINIC | Age: 33
End: 2023-07-13
Attending: OBSTETRICS & GYNECOLOGY
Payer: MEDICARE

## 2023-07-13 ENCOUNTER — TELEPHONE (OUTPATIENT)
Dept: ENDOCRINOLOGY | Facility: CLINIC | Age: 33
End: 2023-07-13

## 2023-07-13 ENCOUNTER — LAB (OUTPATIENT)
Dept: LAB | Facility: CLINIC | Age: 33
End: 2023-07-13
Attending: OBSTETRICS & GYNECOLOGY
Payer: MEDICARE

## 2023-07-13 VITALS
DIASTOLIC BLOOD PRESSURE: 82 MMHG | WEIGHT: 171.8 LBS | OXYGEN SATURATION: 99 % | SYSTOLIC BLOOD PRESSURE: 116 MMHG | HEART RATE: 79 BPM | BODY MASS INDEX: 33.55 KG/M2 | RESPIRATION RATE: 16 BRPM

## 2023-07-13 DIAGNOSIS — N18.30 STAGE 3 CHRONIC KIDNEY DISEASE, UNSPECIFIED WHETHER STAGE 3A OR 3B CKD (H): ICD-10-CM

## 2023-07-13 DIAGNOSIS — O36.5990 FETAL GROWTH RESTRICTION ANTEPARTUM: Primary | ICD-10-CM

## 2023-07-13 DIAGNOSIS — O36.5990 FETAL GROWTH RESTRICTION ANTEPARTUM: ICD-10-CM

## 2023-07-13 DIAGNOSIS — O09.92 HIGH-RISK PREGNANCY IN SECOND TRIMESTER: ICD-10-CM

## 2023-07-13 DIAGNOSIS — Z94.0 KIDNEY REPLACED BY TRANSPLANT: ICD-10-CM

## 2023-07-13 DIAGNOSIS — E86.0 DEHYDRATION: ICD-10-CM

## 2023-07-13 DIAGNOSIS — E58 CALCIUM DEFICIENCY: ICD-10-CM

## 2023-07-13 DIAGNOSIS — O09.92 SUPERVISION OF HIGH RISK PREGNANCY IN SECOND TRIMESTER: Primary | ICD-10-CM

## 2023-07-13 DIAGNOSIS — Z48.298 AFTERCARE FOLLOWING ORGAN TRANSPLANT: ICD-10-CM

## 2023-07-13 LAB
ALBUMIN SERPL BCG-MCNC: 3.8 G/DL (ref 3.5–5.2)
ALP SERPL-CCNC: 123 U/L (ref 35–104)
ALT SERPL W P-5'-P-CCNC: 17 U/L (ref 0–50)
ANION GAP SERPL CALCULATED.3IONS-SCNC: 10 MMOL/L (ref 7–15)
AST SERPL W P-5'-P-CCNC: 17 U/L (ref 0–45)
BASOPHILS # BLD AUTO: 0 10E3/UL (ref 0–0.2)
BASOPHILS NFR BLD AUTO: 1 %
BILIRUB SERPL-MCNC: 0.5 MG/DL
BUN SERPL-MCNC: 29.8 MG/DL (ref 6–20)
CA-I BLD-MCNC: 4.9 MG/DL (ref 4.4–5.2)
CALCIUM SERPL-MCNC: 9.3 MG/DL (ref 8.6–10)
CHLORIDE SERPL-SCNC: 104 MMOL/L (ref 98–107)
CREAT SERPL-MCNC: 1.38 MG/DL (ref 0.51–0.95)
DEPRECATED HCO3 PLAS-SCNC: 21 MMOL/L (ref 22–29)
EOSINOPHIL # BLD AUTO: 0.1 10E3/UL (ref 0–0.7)
EOSINOPHIL NFR BLD AUTO: 2 %
ERYTHROCYTE [DISTWIDTH] IN BLOOD BY AUTOMATED COUNT: 14.5 % (ref 10–15)
GFR SERPL CREATININE-BSD FRML MDRD: 53 ML/MIN/1.73M2
GLUCOSE SERPL-MCNC: 94 MG/DL (ref 70–99)
HCT VFR BLD AUTO: 28.3 % (ref 35–47)
HGB BLD-MCNC: 9.1 G/DL (ref 11.7–15.7)
IMM GRANULOCYTES # BLD: 0.2 10E3/UL
IMM GRANULOCYTES NFR BLD: 3 %
LYMPHOCYTES # BLD AUTO: 1 10E3/UL (ref 0.8–5.3)
LYMPHOCYTES NFR BLD AUTO: 15 %
MAGNESIUM SERPL-MCNC: 2.3 MG/DL (ref 1.7–2.3)
MCH RBC QN AUTO: 31.4 PG (ref 26.5–33)
MCHC RBC AUTO-ENTMCNC: 32.2 G/DL (ref 31.5–36.5)
MCV RBC AUTO: 98 FL (ref 78–100)
MONOCYTES # BLD AUTO: 0.4 10E3/UL (ref 0–1.3)
MONOCYTES NFR BLD AUTO: 6 %
NEUTROPHILS # BLD AUTO: 5.1 10E3/UL (ref 1.6–8.3)
NEUTROPHILS NFR BLD AUTO: 73 %
NRBC # BLD AUTO: 0 10E3/UL
NRBC BLD AUTO-RTO: 0 /100
PHOSPHATE SERPL-MCNC: 3.3 MG/DL (ref 2.5–4.5)
PLATELET # BLD AUTO: 289 10E3/UL (ref 150–450)
POTASSIUM SERPL-SCNC: 5 MMOL/L (ref 3.4–5.3)
PROT SERPL-MCNC: 7.3 G/DL (ref 6.4–8.3)
RBC # BLD AUTO: 2.9 10E6/UL (ref 3.8–5.2)
SODIUM SERPL-SCNC: 135 MMOL/L (ref 136–145)
TACROLIMUS BLD-MCNC: 5.8 UG/L (ref 5–15)
TME LAST DOSE: NORMAL H
TME LAST DOSE: NORMAL H
WBC # BLD AUTO: 6.9 10E3/UL (ref 4–11)

## 2023-07-13 PROCEDURE — 80197 ASSAY OF TACROLIMUS: CPT

## 2023-07-13 PROCEDURE — 85025 COMPLETE CBC W/AUTO DIFF WBC: CPT

## 2023-07-13 PROCEDURE — G0463 HOSPITAL OUTPT CLINIC VISIT: HCPCS | Mod: 25 | Performed by: OBSTETRICS & GYNECOLOGY

## 2023-07-13 PROCEDURE — 83735 ASSAY OF MAGNESIUM: CPT | Performed by: OBSTETRICS & GYNECOLOGY

## 2023-07-13 PROCEDURE — 84100 ASSAY OF PHOSPHORUS: CPT | Performed by: OBSTETRICS & GYNECOLOGY

## 2023-07-13 PROCEDURE — 76815 OB US LIMITED FETUS(S): CPT | Mod: 26 | Performed by: OBSTETRICS & GYNECOLOGY

## 2023-07-13 PROCEDURE — 59025 FETAL NON-STRESS TEST: CPT | Mod: 26 | Performed by: OBSTETRICS & GYNECOLOGY

## 2023-07-13 PROCEDURE — 59025 FETAL NON-STRESS TEST: CPT | Performed by: OBSTETRICS & GYNECOLOGY

## 2023-07-13 PROCEDURE — 76820 UMBILICAL ARTERY ECHO: CPT | Mod: 26 | Performed by: OBSTETRICS & GYNECOLOGY

## 2023-07-13 PROCEDURE — 82330 ASSAY OF CALCIUM: CPT | Performed by: OBSTETRICS & GYNECOLOGY

## 2023-07-13 PROCEDURE — 80053 COMPREHEN METABOLIC PANEL: CPT | Performed by: OBSTETRICS & GYNECOLOGY

## 2023-07-13 PROCEDURE — 76820 UMBILICAL ARTERY ECHO: CPT

## 2023-07-13 PROCEDURE — 59025 FETAL NON-STRESS TEST: CPT

## 2023-07-13 PROCEDURE — 36415 COLL VENOUS BLD VENIPUNCTURE: CPT

## 2023-07-13 NOTE — NURSING NOTE
NST Performed due to FGR.  Zohaib Tellez and Dinesh reviewed reviewed efm tracing. See NST/BPP Doc Flowsheet tab.

## 2023-07-13 NOTE — PATIENT INSTRUCTIONS
Birth Control Methods  Birth control methods are used to help prevent pregnancy. There are many different methods to choose from. Talk with your healthcare provider about which method is right for you. Be sure to ask your provider how well each one works. Also ask about the benefits, risks, and side effects of each method.   Hormones  Some birth control methods work by releasing hormones such as progestin and estrogen. These methods include hormone implants, hormone shots, the vaginal ring, the patch, and birth control pills. They all work by stopping the release of the egg from the ovary (ovulation). All of these methods work well and can be stopped at any time.     The implant is a small device that needs to be placed in the upper arm by a trained healthcare provider. It works for up to 3 years.    Hormone injections must be repeated every 3 months.    The vaginal ring must be replaced monthly. It can be removed during the fourth week of each cycle.    The patch must be replaced weekly. It's not worn during the fourth week of each cycle.    Birth control pills must be taken every day.  Intrauterine device (IUD)  An IUD is a small, T-shaped device. It must be placed in the uterus by a trained healthcare provider. There are different types of IUDs available. They work by causing changes in the uterus that make it harder for sperm to reach the egg. Depending on the type of IUD you have, it may work for several years or longer. The IUD is a reversible birth control method. This means it can be removed at any time.   Condom  A condom is a sheath that forms a thin barrier between the penis and the vagina. It helps prevent pregnancy by keeping sperm from entering the vagina. When latex condoms are used, they have the added benefit of protecting against most STIs (sexually transmitted infections). Condoms are used each time there is sexual intercourse and should be discarded after each use. Ask your healthcare  provider about the different types of condoms available. These include both the male condom and female condom.   Spermicide  Spermicides come as foams, jellies, creams, suppositories, and tablets.  They help prevent pregnancy by killing sperm. When used alone they are not that reliable. They work best when combined with other birth control methods such as diaphragms and cervical caps.   Sponge, diaphragm, and cervical cap   All of these methods help prevent pregnancy by covering the opening of the uterus (cervix). This prevents sperm from passing through.   The sponge contains spermicide. It can be bought over the counter. The sponge must be left in place for at least 6 hours after the last time you have sex. However, it should not stay in place for more than 24 hours. Discard after use.   The diaphragm and cervical cap must be fitted and prescribed by your healthcare provider. Both are used with spermicide. The diaphragm must be left in place for at least 6 hours after sex. However, it should not stay in place for more than 24 hours. It can be washed and reused. The cervical cap must be left in place for at least 6 hours after sex. However, it should not stay in place for more than 48 hours. It can be washed and reused.   Withdrawal method  This is when the man pulls his penis out of the vagina just before ejaculation ( coming ). This lowers the amount of sperm entering the vagina. Be aware that fluids released just before ejaculation often still contain some sperm, so this method is not as reliable as certain other methods.   Rhythm method   This method is also call natural family planning or fertility awareness. It requires that you know when in your menstrual cycle you are likely to become pregnant. Then you not have sex during those days. This requires careful planning and good discipline. Your healthcare provider can explain more about how this works.   Tubal ligation and vasectomy  These are surgical methods  to prevent pregnancy. Tubal ligation is an option for women. The fallopian tubes are blocked or cut (ligated). This keeps the egg from passing into the uterus or sperm from reaching the egg. Vasectomy is an option for men. The tubes that normally carry sperm to the penis are either closed or blocked. Both tubal ligation and vasectomy are permanent birth control methods. This means reversal is either not possible or unlikely to work. They are good choices for women and men who know that they don't want to have children in the future.   [employer] last reviewed this educational content on 7/1/2020 2000-2022 [employer]. All rights reserved. This information is not intended as a substitute for professional medical care. Always follow your healthcare professional's instructions.

## 2023-07-13 NOTE — PROGRESS NOTES
Maternal-Fetal Medicine OB Follow Up Visit    Mona Wagner  : 1990  MRN: [id number]    CC: OB Follow-up    Subjective:  Mona Wagner is a 32 year old  at 29w6d x LMP c/w 8w4d sono presenting for OB follow-up.     Today, she is here alone.    She is overall feeling well.  She was admitted 23 - 23 for E.Coli UTI with bacteremia.  She was discharged home with outpatient infusion appointments per ID recommendations for 7 days IV Ceftriaxone with plan to initiate Keflex for secondary prophylaxis.  Since discharge she has had 3 hr GTT which was WNL.  She also had some significant itching that she was concerned was related to the abx.  Labs were obtained 7/10/23 with bile acids WNL and LFTs WNL without concern for cholestasis.  CMP was concerning for K of 5.5.  This was discussed with transplant nephrologist Dr. Hill who recommended outpatient management with Lokelma and bicarbonate.  She initiated these medications yesterday.  She also states her Tacrolimus level was 8.3 on 7/10/23 which was higher and her dose was decreased.  Per her nephrologist, this could also be contributing to increased K.  Since her Tacrolimus dose was decreased, her itching has completely resolved.  She is taking Keflex PO without concern.  Denies rash.  Denies vaginal bleeding, LOF, contractions, vaginal discharge, problems with nephrostomy.  Denies fever, chills, headache, chest pain, shortness of breath, leg swelling.  No additional concerns.    OB Hx:  OB History    Para Term  AB Living   1 0 0 0 0 0   SAB IAB Ectopic Multiple Live Births   0 0 0 0 0      # Outcome Date GA Lbr Robinson/2nd Weight Sex Delivery Anes PTL Lv   1 Current                Objective:  LMP 2022   /82; HR 79    Gen: alert, oriented  Skin: warm, dry, intact  Heart:  Regular rate  Respiratory: breathing unlabored  Abdominal: gravid, non-tender, nephrostomy insertion site appears well, urine flowing without obstruction to collection  "bag.   Extremities: no edema, AV fistula in place on left forearm.  Psych: mood WNL, behavior WNL    OB Ultrasound:  Please see \"imaging\" tab under chart review for today's ultrasound results.    Labs:  23 3 hr GTT WNL  7/10/23 Na 135, K 5.5, Anion gap 10, Creatinine 1.49, AST 21, ALT 13, total bile acids 3, ionized calcium 1.18, tacro 8.3, protein/creatinine 0.25, hgb 9.1, plt 305    Assessment/Plan:  32 year old  at 29w6d x LMP c/w 8w4d sono here for follow OB visit.    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 Mcintyre in transplant nephrology. Resolution of cHTN s/p transplant.  Next visit 23  - Nephrostomy tube placed on  with subsequent hyperkalemia due to profound post obstruction diuresis after placement. This resolved however K 5.5 on 7/10/23.  Started on Lokelma and bicarbonate with plan for outpatient management per Dr. Hill.  Recheck pending today to ensure not worsening  - CBC, CMP, mag, phos, calcium, tacro level drawn today and pending  - Continue with Tacrolimus (goal 4-6) and Azathioprine as prescribed by nephrology. Tacro level pending today (dose just decreased as level was a bit high)  - Baseline Cr 0.8-1.1  - Urine protein/creatinine stable, last 0.25  - Close management of blood pressure with goal of <130/90  - Early detection and treatment of asymptomatic bacteriuria with urine culture at least every " trimester. UC last on  and WNL.  - Urology   - Nephrology   - Itching resolved after decreasing tacrolimus dose, recommend continuing daily Keflex prophylaxis     Hypothyroidism:  Secondary Hyperparathyroidism with hypercalcemia   - Resection of parathyroid glands on .   - Follows with endo. Continue current levothyroxine prescription 37.5 mcg daily.  - 23 TSH 1.32  - Endocrinology next visit      Hx DVT:  - Assessment for inherited thrombophilias including Factor V Leiden and Prothrombin Gene Mutation:  Negative.  - Additional hypercoagulability work up negative.     Hx of bacterial endocarditis:  - Echo on 3/20:  Interpretation Summary  Global and regional left ventricular function is hyperkinetic with an EF >70%.  Right ventricular function, chamber size, wall motion, and thickness are  normal.  Pulmonary artery systolic pressure is normal.  The inferior vena cava cannot be assessed.  No pericardial effusion is present.     Routine PNC:  - Prenatal labs:               Rh: +  antibody: neg               HepB/HIV/RPR/HepC: nonreactive               GC/CT: neg               Rubella: non-immune  Varicella: non-immune               GCT: passed early GCT. Will repeat today               UC: no growth               Pap: NIL and HPV neg  - Immunizations: s/p Flu and Covid. TDAP next week per her request, defers this today  - Genetic screening: NIPT low-risk. Low-risk NT       Surveillance:  FGR AC 9% with normal UAR:   - Serial growth US q3 weeks, due next week  - Weekly NST with UAR     Delivery Planning:  - Timing of delivery will be indicated by secondary sequelae and fetal FGR, but generally 37-39 weeks.  reserved for usual obstetrical indications.   - Labor analgesia: considering epidural  - Feeding: plans to formula feed  - Contraception: undecided    RTC in 1 week for  surveillance, OB visit, and growth US.  Will review labs following appointment and call Mona TORRES  is worsening.    Patient seen with Dr. Rosalinda Montiel MD   Maternal-Fetal Medicine Fellow, PGY7  7/13/2023 11:06 AM      Physician Attestation   I, Timothy Tellez MD, saw this patient and agree with the findings and plan of care as documented in the note.      Items personally reviewed/procedural attestation: History, record review, plan of care and management. The total time spent in all patient care activities on the day of this visit was 20 minutes.    Timothy Tellez MD

## 2023-07-13 NOTE — TELEPHONE ENCOUNTER
Dear Mona    Here are your calcium  lab which look GOOD - your thyroid labs look good last week. Please recheck in 1 month.     If you have any questions, please feel free to contact my nurse at [phone] select option #3 for triage nurse  or  option #1 for scheduling related questions.    Regards    Katrin Lopez MD     Lab on 07/13/2023   Component Date Value Ref Range Status     WBC Count 07/13/2023 6.9  4.0 - 11.0 10e3/uL Final     RBC Count 07/13/2023 2.90 (L)  3.80 - 5.20 10e6/uL Final     Hemoglobin 07/13/2023 9.1 (L)  11.7 - 15.7 g/dL Final     Hematocrit 07/13/2023 28.3 (L)  35.0 - 47.0 % Final     MCV 07/13/2023 98  78 - 100 fL Final     MCH 07/13/2023 31.4  26.5 - 33.0 pg Final     MCHC 07/13/2023 32.2  31.5 - 36.5 g/dL Final     RDW 07/13/2023 14.5  10.0 - 15.0 % Final     Platelet Count 07/13/2023 289  150 - 450 10e3/uL Final     % Neutrophils 07/13/2023 73  % Final     % Lymphocytes 07/13/2023 15  % Final     % Monocytes 07/13/2023 6  % Final     % Eosinophils 07/13/2023 2  % Final     % Basophils 07/13/2023 1  % Final     % Immature Granulocytes 07/13/2023 3  % Final     NRBCs per 100 WBC 07/13/2023 0  <1 /100 Final     Absolute Neutrophils 07/13/2023 5.1  1.6 - 8.3 10e3/uL Final     Absolute Lymphocytes 07/13/2023 1.0  0.8 - 5.3 10e3/uL Final     Absolute Monocytes 07/13/2023 0.4  0.0 - 1.3 10e3/uL Final     Absolute Eosinophils 07/13/2023 0.1  0.0 - 0.7 10e3/uL Final     Absolute Basophils 07/13/2023 0.0  0.0 - 0.2 10e3/uL Final     Absolute Immature Granulocytes 07/13/2023 0.2  <=0.4 10e3/uL Final     Absolute NRBCs 07/13/2023 0.0  10e3/uL Final   Office Visit on 07/13/2023   Component Date Value Ref Range Status     Calcium Ionized 07/13/2023 4.9  4.4 - 5.2 mg/dL Final     Sodium 07/13/2023 135 (L)  136 - 145 mmol/L Final     Potassium 07/13/2023 5.0  3.4 - 5.3 mmol/L Final     Chloride 07/13/2023 104  98 - 107 mmol/L Final     Carbon Dioxide (CO2) 07/13/2023 21 (L)  22 - 29 mmol/L Final      Anion Gap 07/13/2023 10  7 - 15 mmol/L Final     Urea Nitrogen 07/13/2023 29.8 (H)  6.0 - 20.0 mg/dL Final     Creatinine 07/13/2023 1.38 (H)  0.51 - 0.95 mg/dL Final     Calcium 07/13/2023 9.3  8.6 - 10.0 mg/dL Final     Glucose 07/13/2023 94  70 - 99 mg/dL Final     Alkaline Phosphatase 07/13/2023 123 (H)  35 - 104 U/L Final     AST 07/13/2023 17  0 - 45 U/L Final    Reference intervals for this test were updated on 6/12/2023 to more accurately reflect our healthy population. There may be differences in the flagging of prior results with similar values performed with this method. Interpretation of those prior results can be made in the context of the updated reference intervals.     ALT 07/13/2023 17  0 - 50 U/L Final    Reference intervals for this test were updated on 6/12/2023 to more accurately reflect our healthy population. There may be differences in the flagging of prior results with similar values performed with this method. Interpretation of those prior results can be made in the context of the updated reference intervals.       Protein Total 07/13/2023 7.3  6.4 - 8.3 g/dL Final     Albumin 07/13/2023 3.8  3.5 - 5.2 g/dL Final     Bilirubin Total 07/13/2023 0.5  <=1.2 mg/dL Final     GFR Estimate 07/13/2023 53 (L)  >60 mL/min/1.73m2 Final     Magnesium 07/13/2023 2.3  1.7 - 2.3 mg/dL Final     Phosphorus 07/13/2023 3.3  2.5 - 4.5 mg/dL Final   Lab on 07/11/2023   Component Date Value Ref Range Status     Gestational GTT Fasting 07/11/2023 91  60 - 94 mg/dL Final     Gestational GTT 1 Hr Post Dose 07/11/2023 178  60 - 179 mg/dL Final     Gestational GTT 2 Hr Post Dose 07/11/2023 128  60 - 154 mg/dL Final     Gestational GTT 3 Hr Post Dose 07/11/2023 117  60 - 139 mg/dL Final   Lab on 07/10/2023   Component Date Value Ref Range Status     WBC Count 07/10/2023 6.8  4.0 - 11.0 10e3/uL Final     RBC Count 07/10/2023 2.98 (L)  3.80 - 5.20 10e6/uL Final     Hemoglobin 07/10/2023 9.1 (L)  11.7 - 15.7 g/dL Final      Hematocrit 07/10/2023 28.5 (L)  35.0 - 47.0 % Final     MCV 07/10/2023 96  78 - 100 fL Final     MCH 07/10/2023 30.5  26.5 - 33.0 pg Final     MCHC 07/10/2023 31.9  31.5 - 36.5 g/dL Final     RDW 07/10/2023 13.8  10.0 - 15.0 % Final     Platelet Count 07/10/2023 305  150 - 450 10e3/uL Final     Total Protein Urine mg/dL 07/10/2023 9.3    mg/dL Final    The reference ranges have not been established in urine protein. The results should be integrated into the clinical context for interpretation.     Total Protein UR MG/MG CR 07/10/2023 0.25 (H)  0.00 - 0.20 mg/mg Cr Final     Creatinine Urine mg/dL 07/10/2023 37.2  mg/dL Final    The reference ranges have not been established in urine creatinine. The results should be integrated into the clinical context for interpretation.     Tacrolimus by Tandem Mass Spectrom* 07/10/2023 8.3  5.0 - 15.0 ug/L Final    Comment: Tacrolimus Reference Range (ug/L):    Kidney Transplant:  Pediatric  0-3 months post transplant: 10-12  3-6 months post transplant: 8-10  6-12 months post transplant: 6-8  >12 months post transplant: 4-7    Adult  0-6 months post transplant: 8-10  6-12 months post transplant: 6-8  >12 months post transplant: 4-6  >5 years post transplant: 3-5    Heart Transplant:  Pediatric  0-12 months post transplant: 10-15  >12 months post transplant: 5-10    Adult  0-3 months post transplant: 10-15  3-6 months post transplant: 8-12  6-12 months post transplant: 6-12  >12 months post transplant: 6-10    Lung Transplant:  0-12 months post transplant: 10-15  >12 months post transplant: 8-12    Liver Transplant:  Pediatric  0-3 months post transplant: 10-15  3-6 months post transplant: 8-10  6 months-5 years post transplant: 6-8   >5 years post transplant: 1-3    Adult  0-3 months post transplant: 10-12  3-6 months post transplant: 8-10  >6 months post transplant: 6-8    Pancreas Transplant:  0-6                            months post transplant: 8-10  >6 months post  transplant: 5-8     Tacrolimus Last Dose Date 07/10/2023 7/9/2023   Final     Tacrolimus Last Dose Time 07/10/2023 10:00 PM   Final     Calcium, Ionized pH 7.4 07/10/2023 1.18  1.11 - 1.30 mmol/L Final     pH 07/10/2023 7.36  7.35 - 7.45 Final     Calcium, Ionized Measured 07/10/2023 1.20  1.11 - 1.30 mmol/L Final     Bile Acids Total 07/10/2023 3  0 - 10 umol/L Final    INTERPRETIVE INFORMATION: Bile Acids, Total    Reference Interval applies to fasting specimens.  Performed By: [facility]  [address] [place] [id number]  : Raffi Elias MD, PhD     Sodium 07/10/2023 135 (L)  136 - 145 mmol/L Final     Potassium 07/10/2023 5.5 (H)  3.4 - 5.3 mmol/L Final     Chloride 07/10/2023 105  98 - 107 mmol/L Final     Carbon Dioxide (CO2) 07/10/2023 20 (L)  22 - 29 mmol/L Final     Anion Gap 07/10/2023 10  7 - 15 mmol/L Final     Urea Nitrogen 07/10/2023 27.1 (H)  6.0 - 20.0 mg/dL Final     Creatinine 07/10/2023 1.49 (H)  0.51 - 0.95 mg/dL Final     Calcium 07/10/2023 9.3  8.6 - 10.0 mg/dL Final     Glucose 07/10/2023 84  70 - 99 mg/dL Final     Alkaline Phosphatase 07/10/2023 117 (H)  35 - 104 U/L Final     AST 07/10/2023 21  0 - 45 U/L Final    Reference intervals for this test were updated on 6/12/2023 to more accurately reflect our healthy population. There may be differences in the flagging of prior results with similar values performed with this method. Interpretation of those prior results can be made in the context of the updated reference intervals.     ALT 07/10/2023 13  0 - 50 U/L Final    Reference intervals for this test were updated on 6/12/2023 to more accurately reflect our healthy population. There may be differences in the flagging of prior results with similar values performed with this method. Interpretation of those prior results can be made in the context of the updated reference intervals.       Protein Total 07/10/2023 7.5  6.4 - 8.3 g/dL Final     Albumin  07/10/2023 3.6  3.5 - 5.2 g/dL Final     Bilirubin Total 07/10/2023 0.6  <=1.2 mg/dL Final     GFR Estimate 07/10/2023 48 (L)  >60 mL/min/1.73m2 Final   Infusion Therapy Visit on 07/07/2023   Component Date Value Ref Range Status     Tacrolimus by Tandem Mass Spectrom* 07/07/2023 5.3  5.0 - 15.0 ug/L Final    Comment: Tacrolimus Reference Range (ug/L):    Kidney Transplant:  Pediatric  0-3 months post transplant: 10-12  3-6 months post transplant: 8-10  6-12 months post transplant: 6-8  >12 months post transplant: 4-7    Adult  0-6 months post transplant: 8-10  6-12 months post transplant: 6-8  >12 months post transplant: 4-6  >5 years post transplant: 3-5    Heart Transplant:  Pediatric  0-12 months post transplant: 10-15  >12 months post transplant: 5-10    Adult  0-3 months post transplant: 10-15  3-6 months post transplant: 8-12  6-12 months post transplant: 6-12  >12 months post transplant: 6-10    Lung Transplant:  0-12 months post transplant: 10-15  >12 months post transplant: 8-12    Liver Transplant:  Pediatric  0-3 months post transplant: 10-15  3-6 months post transplant: 8-10  6 months-5 years post transplant: 6-8   >5 years post transplant: 1-3    Adult  0-3 months post transplant: 10-12  3-6 months post transplant: 8-10  >6 months post transplant: 6-8    Pancreas Transplant:  0-6                            months post transplant: 8-10  >6 months post transplant: 5-8     Tacrolimus Last Dose Date 07/07/2023 7/6/2023   Final     Tacrolimus Last Dose Time 07/07/2023 10:00 PM   Final     Sodium 07/07/2023 136  136 - 145 mmol/L Final     Potassium 07/07/2023 5.1  3.4 - 5.3 mmol/L Final     Chloride 07/07/2023 107  98 - 107 mmol/L Final     Carbon Dioxide (CO2) 07/07/2023 19 (L)  22 - 29 mmol/L Final     Anion Gap 07/07/2023 10  7 - 15 mmol/L Final     Urea Nitrogen 07/07/2023 25.3 (H)  6.0 - 20.0 mg/dL Final     Creatinine 07/07/2023 1.36 (H)  0.51 - 0.95 mg/dL Final     Calcium 07/07/2023 9.5  8.6 - 10.0  mg/dL Final     Glucose 07/07/2023 87  70 - 99 mg/dL Final     GFR Estimate 07/07/2023 53 (L)  >60 mL/min/1.73m2 Final     Free T4 07/07/2023 1.14  0.90 - 1.70 ng/dL Final     T3 Free 07/07/2023 2.0  2.0 - 4.4 pg/mL Final     TSH 07/07/2023 1.32  0.30 - 4.20 uIU/mL Final     Total Protein Urine mg/dL 07/07/2023 <6.0    mg/dL Final     Total Protein UR MG/MG CR 07/07/2023    Final    Unable to calculate, urine creatinine or protein is outside the detectable limits.     Creatinine Urine mg/dL 07/07/2023 17.6  mg/dL Final     WBC Count 07/07/2023 7.3  4.0 - 11.0 10e3/uL Final     RBC Count 07/07/2023 2.70 (L)  3.80 - 5.20 10e6/uL Final     Hemoglobin 07/07/2023 8.3 (L)  11.7 - 15.7 g/dL Final     Hematocrit 07/07/2023 25.7 (L)  35.0 - 47.0 % Final     MCV 07/07/2023 95  78 - 100 fL Final     MCH 07/07/2023 30.7  26.5 - 33.0 pg Final     MCHC 07/07/2023 32.3  31.5 - 36.5 g/dL Final     RDW 07/07/2023 13.8  10.0 - 15.0 % Final     Platelet Count 07/07/2023 249  150 - 450 10e3/uL Final     % Neutrophils 07/07/2023 81  % Final     % Lymphocytes 07/07/2023 8  % Final     % Monocytes 07/07/2023 6  % Final     % Eosinophils 07/07/2023 3  % Final     % Basophils 07/07/2023 0  % Final     % Immature Granulocytes 07/07/2023 2  % Final     NRBCs per 100 WBC 07/07/2023 0  <1 /100 Final     Absolute Neutrophils 07/07/2023 5.9  1.6 - 8.3 10e3/uL Final     Absolute Lymphocytes 07/07/2023 0.6 (L)  0.8 - 5.3 10e3/uL Final     Absolute Monocytes 07/07/2023 0.5  0.0 - 1.3 10e3/uL Final     Absolute Eosinophils 07/07/2023 0.2  0.0 - 0.7 10e3/uL Final     Absolute Basophils 07/07/2023 0.0  0.0 - 0.2 10e3/uL Final     Absolute Immature Granulocytes 07/07/2023 0.1  <=0.4 10e3/uL Final     Absolute NRBCs 07/07/2023 0.0  10e3/uL Final

## 2023-07-13 NOTE — PROGRESS NOTES
"Please see \"Imaging\" tab under \"Chart Review\" for details of today's US at the [facility].    Timothy Tellez MD  Maternal-Fetal Medicine      "

## 2023-07-17 ENCOUNTER — LAB (OUTPATIENT)
Dept: LAB | Facility: HOSPITAL | Age: 33
End: 2023-07-17
Payer: MEDICARE

## 2023-07-17 DIAGNOSIS — E86.0 DEHYDRATION: ICD-10-CM

## 2023-07-17 DIAGNOSIS — Z48.298 AFTERCARE FOLLOWING ORGAN TRANSPLANT: ICD-10-CM

## 2023-07-17 LAB
ALBUMIN MFR UR ELPH: 10.9 MG/DL
ANION GAP SERPL CALCULATED.3IONS-SCNC: 10 MMOL/L (ref 7–15)
BASOPHILS # BLD AUTO: 0 10E3/UL (ref 0–0.2)
BASOPHILS NFR BLD AUTO: 0 %
BUN SERPL-MCNC: 23.6 MG/DL (ref 6–20)
CALCIUM SERPL-MCNC: 8.9 MG/DL (ref 8.6–10)
CHLORIDE SERPL-SCNC: 106 MMOL/L (ref 98–107)
CREAT SERPL-MCNC: 1.18 MG/DL (ref 0.51–0.95)
CREAT UR-MCNC: 21.2 MG/DL
DEPRECATED HCO3 PLAS-SCNC: 20 MMOL/L (ref 22–29)
EOSINOPHIL # BLD AUTO: 0.1 10E3/UL (ref 0–0.7)
EOSINOPHIL NFR BLD AUTO: 1 %
ERYTHROCYTE [DISTWIDTH] IN BLOOD BY AUTOMATED COUNT: 14.3 % (ref 10–15)
GFR SERPL CREATININE-BSD FRML MDRD: 63 ML/MIN/1.73M2
GLUCOSE SERPL-MCNC: 87 MG/DL (ref 70–99)
HCT VFR BLD AUTO: 28.4 % (ref 35–47)
HGB BLD-MCNC: 8.6 G/DL (ref 11.7–15.7)
IMM GRANULOCYTES # BLD: 0.1 10E3/UL
IMM GRANULOCYTES NFR BLD: 1 %
LYMPHOCYTES # BLD AUTO: 0.9 10E3/UL (ref 0.8–5.3)
LYMPHOCYTES NFR BLD AUTO: 13 %
MCH RBC QN AUTO: 30 PG (ref 26.5–33)
MCHC RBC AUTO-ENTMCNC: 30.3 G/DL (ref 31.5–36.5)
MCV RBC AUTO: 99 FL (ref 78–100)
MONOCYTES # BLD AUTO: 0.3 10E3/UL (ref 0–1.3)
MONOCYTES NFR BLD AUTO: 4 %
NEUTROPHILS # BLD AUTO: 5.4 10E3/UL (ref 1.6–8.3)
NEUTROPHILS NFR BLD AUTO: 81 %
NRBC # BLD AUTO: 0 10E3/UL
NRBC BLD AUTO-RTO: 0 /100
PLATELET # BLD AUTO: 245 10E3/UL (ref 150–450)
POTASSIUM SERPL-SCNC: 5.1 MMOL/L (ref 3.4–5.3)
PROT/CREAT 24H UR: 0.51 MG/MG CR (ref 0–0.2)
RBC # BLD AUTO: 2.87 10E6/UL (ref 3.8–5.2)
SODIUM SERPL-SCNC: 136 MMOL/L (ref 136–145)
TACROLIMUS BLD-MCNC: 4.5 UG/L (ref 5–15)
TME LAST DOSE: ABNORMAL H
TME LAST DOSE: ABNORMAL H
WBC # BLD AUTO: 6.8 10E3/UL (ref 4–11)

## 2023-07-17 PROCEDURE — 85004 AUTOMATED DIFF WBC COUNT: CPT

## 2023-07-17 PROCEDURE — 80197 ASSAY OF TACROLIMUS: CPT

## 2023-07-17 PROCEDURE — 84156 ASSAY OF PROTEIN URINE: CPT

## 2023-07-17 PROCEDURE — 80048 BASIC METABOLIC PNL TOTAL CA: CPT

## 2023-07-17 PROCEDURE — 36415 COLL VENOUS BLD VENIPUNCTURE: CPT

## 2023-07-19 ENCOUNTER — OFFICE VISIT (OUTPATIENT)
Dept: MATERNAL FETAL MEDICINE | Facility: CLINIC | Age: 33
End: 2023-07-19
Attending: OBSTETRICS & GYNECOLOGY
Payer: MEDICARE

## 2023-07-19 ENCOUNTER — HOSPITAL ENCOUNTER (OUTPATIENT)
Dept: ULTRASOUND IMAGING | Facility: CLINIC | Age: 33
Discharge: HOME | End: 2023-07-19
Attending: OBSTETRICS & GYNECOLOGY
Payer: MEDICARE

## 2023-07-19 VITALS
HEART RATE: 83 BPM | RESPIRATION RATE: 20 BRPM | OXYGEN SATURATION: 98 % | DIASTOLIC BLOOD PRESSURE: 85 MMHG | SYSTOLIC BLOOD PRESSURE: 128 MMHG | BODY MASS INDEX: 34.06 KG/M2 | WEIGHT: 174.4 LBS

## 2023-07-19 DIAGNOSIS — Z94.0 KIDNEY REPLACED BY TRANSPLANT: ICD-10-CM

## 2023-07-19 DIAGNOSIS — O09.893 CURRENT PREGNANCY IN THIRD TRIMESTER WITH HISTORY OF RENAL TRANSPLANT: Primary | ICD-10-CM

## 2023-07-19 DIAGNOSIS — N18.30 STAGE 3 CHRONIC KIDNEY DISEASE, UNSPECIFIED WHETHER STAGE 3A OR 3B CKD (H): ICD-10-CM

## 2023-07-19 DIAGNOSIS — Z13.1 ENCOUNTER FOR SCREENING EXAMINATION FOR IMPAIRED GLUCOSE REGULATION AND DIABETES MELLITUS: ICD-10-CM

## 2023-07-19 DIAGNOSIS — O36.5990 FETAL GROWTH RESTRICTION ANTEPARTUM: ICD-10-CM

## 2023-07-19 DIAGNOSIS — Z94.0 CURRENT PREGNANCY IN THIRD TRIMESTER WITH HISTORY OF RENAL TRANSPLANT: Primary | ICD-10-CM

## 2023-07-19 DIAGNOSIS — O09.92 HIGH-RISK PREGNANCY IN SECOND TRIMESTER: ICD-10-CM

## 2023-07-19 PROCEDURE — 76816 OB US FOLLOW-UP PER FETUS: CPT | Mod: 26 | Performed by: OBSTETRICS & GYNECOLOGY

## 2023-07-19 PROCEDURE — 99213 OFFICE O/P EST LOW 20 MIN: CPT | Mod: 25 | Performed by: ADVANCED PRACTICE MIDWIFE

## 2023-07-19 PROCEDURE — 250N000011 HC RX IP 250 OP 636

## 2023-07-19 PROCEDURE — 76816 OB US FOLLOW-UP PER FETUS: CPT

## 2023-07-19 PROCEDURE — 90471 IMMUNIZATION ADMIN: CPT

## 2023-07-19 PROCEDURE — 90715 TDAP VACCINE 7 YRS/> IM: CPT

## 2023-07-19 ASSESSMENT — PAIN SCALES - GENERAL: PAINLEVEL: NO PAIN (0)

## 2023-07-19 NOTE — PROGRESS NOTES
"Please see \"Imaging\" tab under \"Chart Review\" for details of today's US at the [facility].    Timothy Tellez MD  Maternal-Fetal Medicine      "

## 2023-07-19 NOTE — NURSING NOTE
Mona seen in clinic today for OB visit at 30w5d gestation. VSS WNL. Pt reports positive fetal movement, see flowsheet. Pt evaluated for  labor, preeclampsia, infection, fetal wellbeing  without concerns. Pt denies bleeding/lof/change in vaginal discharge/contractions/headache/vision changes/chest pain/SOB/edema. Pt reports some increased vaginal pressure.  Pt notified to review new pt education in AVS, verbally reviewed highlights.Sonam Toledo met with pt and discussed POC. Plan for pt to have weekly BPP's beginning at 32 weeks and f/u comp in 3 weeks and weekly OBV. Future visits scheduled at . Pt discharged stable and ambulatory.  Delicia Parker RN

## 2023-07-19 NOTE — PROGRESS NOTES
"Maternal fetal Medicine OB Follow up visit.     Mona Wagner  : 1990  MRN: [id number]    CC: OB Follow-up    Subjective:  Mnoa Wagner is a 32 year old  at 30w5d presenting for routine OB follow-up. Today, she is feeling well. She reports that the previous itching/tingling of her fingertips and toes has resolved since decreasing her tacrolimus. Her PNT is draining well without leaking at the site, but she states her partner is questioning if the tube has shifted when he last changed her dressing. Has follow up with her nephrologist next week. She is taking her daily Keflex without issue, but does not consistently soft stools. Home BP checks are 115-120s/80s. She has been abstaining from intercourse since her threatened  labor at 27, but is wondering if she can resume intercourse at this time. Patient denies regular, painful contractions, denies loss of fluid or vaginal bleeding. Reports fetal movement.       OB Hx:  OB History    Para Term  AB Living   1 0 0 0 0 0   SAB IAB Ectopic Multiple Live Births   0 0 0 0 0      # Outcome Date GA Lbr Robinson/2nd Weight Sex Delivery Anes PTL Lv   1 Current                  Objective:  /85 (BP Location: Left arm, Patient Position: Sitting, Cuff Size: Adult Regular)   Pulse 83   Resp 20   Wt 79.1 kg (174 lb 6.4 oz)   LMP 2022   SpO2 98%   BMI 34.06 kg/m      Gen: alert, oriented, NAD  Skin: warm, dry, intact  Respiratory: breathing unlabored, no SOB  Abdominal: gravid, non-tender, PNT tube site covered with dressing C/D/I  Pelvic: deferred  Extremities: no edema noted, AV fistula in place on left forearm.  Psych: mood WNL, behavior WNL      OB Ultrasound:  Please see \"imaging\" tab under chart review for today's ultrasound results.      Assessment/Plan:  32 year old  at 30w5d here for follow OB visit.    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 Mcintyre in transplant nephrology. Resolution of cHTN s/p transplant.  Next visit 7/24/23  - Nephrostomy tube placed on 6/9 with subsequent hyperkalemia due to profound post obstruction diuresis after placement. This resolved however K 5.5 on 7/10/23.  Started on Lokelma and bicarbonate with plan for outpatient management per Dr. Hill.  Recheck improved on 7/17  - Continue with Tacrolimus (goal 4-6) and Azathioprine as prescribed by nephrology.   - Baseline Cr 0.8-1.1. 7/17: 1.1  - Urine protein/creatinine stable, last 0.51  - Close management of blood pressure with goal of <130/90  - Early detection and treatment of asymptomatic bacteriuria with urine culture at least every trimester. UC last on 6/8 and WNL. Consider collection at next OB visit.  - Urology 7/28  - Nephrology 7/24  - Itching resolved after decreasing tacrolimus dose, recommend continuing daily Keflex prophylaxis     Hypothyroidism:  Secondary Hyperparathyroidism with hypercalcemia   - Resection of parathyroid glands on 4/28.   - Follows with endo. Continue current levothyroxine prescription 37.5 mcg daily.  - 7/7/23 TSH 1.32  - Endocrinology next visit 7/28     Hx DVT:  - Assessment for inherited thrombophilias including Factor V Leiden and Prothrombin Gene Mutation:  Negative.  - Additional hypercoagulability work up negative.     Hx of bacterial endocarditis:  - Echo on 3/20:  Interpretation Summary  Global and regional left ventricular function is  hyperkinetic with an EF >70%.  Right ventricular function, chamber size, wall motion, and thickness are  normal.  Pulmonary artery systolic pressure is normal.  The inferior vena cava cannot be assessed.  No pericardial effusion is present.     Routine PNC:  - Prenatal labs:               Rh: +  antibody: neg               HepB/HIV/RPR/HepC: nonreactive               GC/CT: neg               Rubella: non-immune  Varicella: non-immune               GCT: passed early    28 wkGCT: elevated    GTT: passed              UC: no growth               Pap: NIL and HPV neg  - Immunizations: s/p Flu and Covid. TDAP next week per her request, defers this today  - Genetic screening: NIPT low-risk. Low-risk NT  - Discussed that she can resume intercourse, but reviewed that with intercourse an orgasm can cause  contractions. Generally these will be benign, but reviewed warning s/s of PTL.      Surveillance:  FGR AC 9% with normal UAR: RESOLVED on   - Serial growth US   - Weekly NST with UAR     Delivery Planning:  - Timing of delivery will be indicated by secondary sequelae and if fetal FGR returns, but generally 37-39 weeks.  reserved for usual obstetrical indications.   - Labor analgesia: considering epidural  - Feeding: plans to formula feed  - Contraception: undecided    RTC in 1 week    25 minutes spent on the date of the encounter, doing chart review, history and exam, documentation and further activities as noted.      Sonam Toledo CNM on 2023 at 9:59 AM

## 2023-07-20 ENCOUNTER — TELEPHONE (OUTPATIENT)
Dept: TRANSPLANT | Facility: CLINIC | Age: 33
End: 2023-07-20

## 2023-07-20 ENCOUNTER — LAB (OUTPATIENT)
Dept: LAB | Facility: HOSPITAL | Age: 33
End: 2023-07-20
Payer: MEDICARE

## 2023-07-20 ENCOUNTER — ALLIED HEALTH/NURSE VISIT (OUTPATIENT)
Dept: TRANSPLANT | Facility: CLINIC | Age: 33
End: 2023-07-20
Attending: INTERNAL MEDICINE
Payer: MEDICARE

## 2023-07-20 VITALS — DIASTOLIC BLOOD PRESSURE: 72 MMHG | HEART RATE: 79 BPM | SYSTOLIC BLOOD PRESSURE: 109 MMHG

## 2023-07-20 DIAGNOSIS — Z48.298 AFTERCARE FOLLOWING ORGAN TRANSPLANT: ICD-10-CM

## 2023-07-20 DIAGNOSIS — Z94.0 KIDNEY REPLACED BY TRANSPLANT: ICD-10-CM

## 2023-07-20 DIAGNOSIS — N18.31 STAGE 3A CHRONIC KIDNEY DISEASE (H): Primary | ICD-10-CM

## 2023-07-20 DIAGNOSIS — N18.31 ANEMIA OF CHRONIC RENAL FAILURE, STAGE 3A (H): ICD-10-CM

## 2023-07-20 DIAGNOSIS — E86.0 DEHYDRATION: ICD-10-CM

## 2023-07-20 DIAGNOSIS — D63.1 ANEMIA OF CHRONIC RENAL FAILURE, STAGE 3A (H): ICD-10-CM

## 2023-07-20 LAB
ANION GAP SERPL CALCULATED.3IONS-SCNC: 10 MMOL/L (ref 7–15)
BASOPHILS # BLD AUTO: 0 10E3/UL (ref 0–0.2)
BASOPHILS NFR BLD AUTO: 0 %
BUN SERPL-MCNC: 23.6 MG/DL (ref 6–20)
CALCIUM SERPL-MCNC: 9 MG/DL (ref 8.6–10)
CHLORIDE SERPL-SCNC: 105 MMOL/L (ref 98–107)
CREAT SERPL-MCNC: 1.29 MG/DL (ref 0.51–0.95)
DEPRECATED HCO3 PLAS-SCNC: 20 MMOL/L (ref 22–29)
EOSINOPHIL # BLD AUTO: 0 10E3/UL (ref 0–0.7)
EOSINOPHIL NFR BLD AUTO: 1 %
ERYTHROCYTE [DISTWIDTH] IN BLOOD BY AUTOMATED COUNT: 14.6 % (ref 10–15)
GFR SERPL CREATININE-BSD FRML MDRD: 57 ML/MIN/1.73M2
GLUCOSE SERPL-MCNC: 78 MG/DL (ref 70–99)
HCT VFR BLD AUTO: 30 % (ref 35–47)
HGB BLD-MCNC: 9.5 G/DL (ref 11.7–15.7)
IMM GRANULOCYTES # BLD: 0.1 10E3/UL
IMM GRANULOCYTES NFR BLD: 1 %
LYMPHOCYTES # BLD AUTO: 0.8 10E3/UL (ref 0.8–5.3)
LYMPHOCYTES NFR BLD AUTO: 11 %
MCH RBC QN AUTO: 30.9 PG (ref 26.5–33)
MCHC RBC AUTO-ENTMCNC: 31.7 G/DL (ref 31.5–36.5)
MCV RBC AUTO: 98 FL (ref 78–100)
MONOCYTES # BLD AUTO: 0.3 10E3/UL (ref 0–1.3)
MONOCYTES NFR BLD AUTO: 5 %
NEUTROPHILS # BLD AUTO: 5.5 10E3/UL (ref 1.6–8.3)
NEUTROPHILS NFR BLD AUTO: 82 %
NRBC # BLD AUTO: 0 10E3/UL
NRBC BLD AUTO-RTO: 0 /100
PLATELET # BLD AUTO: 215 10E3/UL (ref 150–450)
POTASSIUM SERPL-SCNC: 5.6 MMOL/L (ref 3.4–5.3)
RBC # BLD AUTO: 3.07 10E6/UL (ref 3.8–5.2)
SODIUM SERPL-SCNC: 135 MMOL/L (ref 136–145)
TACROLIMUS BLD-MCNC: 5.3 UG/L (ref 5–15)
TME LAST DOSE: NORMAL H
TME LAST DOSE: NORMAL H
WBC # BLD AUTO: 6.8 10E3/UL (ref 4–11)

## 2023-07-20 PROCEDURE — 250N000011 HC RX IP 250 OP 636: Mod: JZ,EC | Performed by: INTERNAL MEDICINE

## 2023-07-20 PROCEDURE — 80048 BASIC METABOLIC PNL TOTAL CA: CPT

## 2023-07-20 PROCEDURE — 85025 COMPLETE CBC W/AUTO DIFF WBC: CPT

## 2023-07-20 PROCEDURE — 36415 COLL VENOUS BLD VENIPUNCTURE: CPT

## 2023-07-20 PROCEDURE — 80197 ASSAY OF TACROLIMUS: CPT

## 2023-07-20 PROCEDURE — 96372 THER/PROPH/DIAG INJ SC/IM: CPT | Performed by: INTERNAL MEDICINE

## 2023-07-20 RX ORDER — ALBUTEROL SULFATE 0.83 MG/ML
2.5 SOLUTION RESPIRATORY (INHALATION)
Status: CANCELLED | OUTPATIENT
Start: 2023-07-20

## 2023-07-20 RX ORDER — METHYLPREDNISOLONE SODIUM SUCCINATE 125 MG/2ML
125 INJECTION, POWDER, LYOPHILIZED, FOR SOLUTION INTRAMUSCULAR; INTRAVENOUS
Status: CANCELLED
Start: 2023-07-20

## 2023-07-20 RX ORDER — DIPHENHYDRAMINE HYDROCHLORIDE 50 MG/ML
50 INJECTION INTRAMUSCULAR; INTRAVENOUS
Status: CANCELLED
Start: 2023-07-20

## 2023-07-20 RX ORDER — EPINEPHRINE 1 MG/ML
0.3 INJECTION, SOLUTION, CONCENTRATE INTRAVENOUS EVERY 5 MIN PRN
Status: CANCELLED | OUTPATIENT
Start: 2023-07-20

## 2023-07-20 RX ORDER — ALBUTEROL SULFATE 90 UG/1
1-2 AEROSOL, METERED RESPIRATORY (INHALATION)
Status: CANCELLED
Start: 2023-07-20

## 2023-07-20 RX ORDER — MEPERIDINE HYDROCHLORIDE 25 MG/ML
25 INJECTION INTRAMUSCULAR; INTRAVENOUS; SUBCUTANEOUS EVERY 30 MIN PRN
Status: CANCELLED | OUTPATIENT
Start: 2023-07-20

## 2023-07-20 RX ADMIN — DARBEPOETIN ALFA 40 MCG: 40 INJECTION, SOLUTION INTRAVENOUS; SUBCUTANEOUS at 15:12

## 2023-07-20 NOTE — PROGRESS NOTES
Chief Complaint   Patient presents with     Allied Health Visit     Aranesp     Blood pressure 109/72, pulse 79, last menstrual period 2022, not currently breastfeeding.    Frequency: q14  Most recent or today's HGB: 9.5   Date: 23  Date of lat dose: First Dose  HGB associated with last dose given: First Dose    Blood Pressure:109/72    Diagnosis: Anemia    Ordered by: Gelacio Mcintyre MD  VIS Offered: Yes    Double Checked by: SHAYY Polo    See MAR for administration details    Pt's first name, last name and  verified prior to medication administration, injection given without complications or questions.     Pennie Augustine CMA

## 2023-07-20 NOTE — TELEPHONE ENCOUNTER
ISSUE: K+: 5.6    PLAN:Gelacio Mcintyre MD Sveiven, Jennifer, RN  Please have her take patiromer x1. Thanks     OUTCOME: patient states she did take once dose of her Patiromer this morning at 1100 when she reviewed her K+ results.      Labs to be repeated on Monday 7/24.      Jennifer Trejo RN   Transplant Coordinator  [phone]

## 2023-07-24 ENCOUNTER — PATIENT OUTREACH (OUTPATIENT)
Dept: CARE COORDINATION | Facility: CLINIC | Age: 33
End: 2023-07-24

## 2023-07-24 ENCOUNTER — LAB (OUTPATIENT)
Dept: LAB | Facility: CLINIC | Age: 33
End: 2023-07-24
Payer: MEDICARE

## 2023-07-24 ENCOUNTER — OFFICE VISIT (OUTPATIENT)
Dept: TRANSPLANT | Facility: CLINIC | Age: 33
End: 2023-07-24
Attending: INTERNAL MEDICINE
Payer: MEDICARE

## 2023-07-24 VITALS
BODY MASS INDEX: 34.53 KG/M2 | SYSTOLIC BLOOD PRESSURE: 125 MMHG | OXYGEN SATURATION: 99 % | HEART RATE: 91 BPM | DIASTOLIC BLOOD PRESSURE: 80 MMHG | TEMPERATURE: 98.1 F | WEIGHT: 176.8 LBS

## 2023-07-24 DIAGNOSIS — N18.31 ANEMIA OF CHRONIC RENAL FAILURE, STAGE 3A (H): ICD-10-CM

## 2023-07-24 DIAGNOSIS — N18.31 STAGE 3A CHRONIC KIDNEY DISEASE (H): ICD-10-CM

## 2023-07-24 DIAGNOSIS — Z48.298 AFTERCARE FOLLOWING ORGAN TRANSPLANT: ICD-10-CM

## 2023-07-24 DIAGNOSIS — E86.0 DEHYDRATION: ICD-10-CM

## 2023-07-24 DIAGNOSIS — R12 HEARTBURN: ICD-10-CM

## 2023-07-24 DIAGNOSIS — Z94.0 KIDNEY REPLACED BY TRANSPLANT: ICD-10-CM

## 2023-07-24 DIAGNOSIS — D63.1 ANEMIA OF CHRONIC RENAL FAILURE, STAGE 3A (H): ICD-10-CM

## 2023-07-24 DIAGNOSIS — O09.93 HIGH-RISK PREGNANCY IN THIRD TRIMESTER: ICD-10-CM

## 2023-07-24 DIAGNOSIS — Z94.0 KIDNEY REPLACED BY TRANSPLANT: Primary | ICD-10-CM

## 2023-07-24 DIAGNOSIS — E55.9 HYPOVITAMINOSIS D: ICD-10-CM

## 2023-07-24 PROBLEM — R78.81 BACTEREMIA: Status: RESOLVED | Noted: 2023-07-05 | Resolved: 2023-07-24

## 2023-07-24 PROBLEM — E21.2 TERTIARY HYPERPARATHYROIDISM (H): Status: RESOLVED | Noted: 2022-08-02 | Resolved: 2023-07-24

## 2023-07-24 LAB
ALBUMIN MFR UR ELPH: 31.4 MG/DL
ANION GAP SERPL CALCULATED.3IONS-SCNC: 11 MMOL/L (ref 7–15)
BASOPHILS # BLD AUTO: 0 10E3/UL (ref 0–0.2)
BASOPHILS NFR BLD AUTO: 1 %
BUN SERPL-MCNC: 25.7 MG/DL (ref 6–20)
CALCIUM SERPL-MCNC: 9.7 MG/DL (ref 8.6–10)
CHLORIDE SERPL-SCNC: 105 MMOL/L (ref 98–107)
CREAT SERPL-MCNC: 1.42 MG/DL (ref 0.51–0.95)
CREAT UR-MCNC: 35.6 MG/DL
DEPRECATED HCO3 PLAS-SCNC: 20 MMOL/L (ref 22–29)
EOSINOPHIL # BLD AUTO: 0.1 10E3/UL (ref 0–0.7)
EOSINOPHIL NFR BLD AUTO: 1 %
ERYTHROCYTE [DISTWIDTH] IN BLOOD BY AUTOMATED COUNT: 14.6 % (ref 10–15)
GFR SERPL CREATININE-BSD FRML MDRD: 51 ML/MIN/1.73M2
GLUCOSE SERPL-MCNC: 126 MG/DL (ref 70–99)
HCT VFR BLD AUTO: 32.1 % (ref 35–47)
HGB BLD-MCNC: 9.9 G/DL (ref 11.7–15.7)
IMM GRANULOCYTES # BLD: 0.1 10E3/UL
IMM GRANULOCYTES NFR BLD: 1 %
LYMPHOCYTES # BLD AUTO: 0.9 10E3/UL (ref 0.8–5.3)
LYMPHOCYTES NFR BLD AUTO: 13 %
MCH RBC QN AUTO: 30.7 PG (ref 26.5–33)
MCHC RBC AUTO-ENTMCNC: 30.8 G/DL (ref 31.5–36.5)
MCV RBC AUTO: 100 FL (ref 78–100)
MONOCYTES # BLD AUTO: 0.4 10E3/UL (ref 0–1.3)
MONOCYTES NFR BLD AUTO: 5 %
NEUTROPHILS # BLD AUTO: 5.8 10E3/UL (ref 1.6–8.3)
NEUTROPHILS NFR BLD AUTO: 79 %
NRBC # BLD AUTO: 0 10E3/UL
NRBC BLD AUTO-RTO: 0 /100
PLATELET # BLD AUTO: 194 10E3/UL (ref 150–450)
POTASSIUM SERPL-SCNC: 5.2 MMOL/L (ref 3.4–5.3)
PROT/CREAT 24H UR: 0.88 MG/MG CR (ref 0–0.2)
RBC # BLD AUTO: 3.22 10E6/UL (ref 3.8–5.2)
SODIUM SERPL-SCNC: 136 MMOL/L (ref 136–145)
TACROLIMUS BLD-MCNC: 5.9 UG/L (ref 5–15)
TME LAST DOSE: NORMAL H
TME LAST DOSE: NORMAL H
WBC # BLD AUTO: 7.3 10E3/UL (ref 4–11)

## 2023-07-24 PROCEDURE — 36415 COLL VENOUS BLD VENIPUNCTURE: CPT | Performed by: PATHOLOGY

## 2023-07-24 PROCEDURE — 99215 OFFICE O/P EST HI 40 MIN: CPT | Performed by: INTERNAL MEDICINE

## 2023-07-24 PROCEDURE — 80048 BASIC METABOLIC PNL TOTAL CA: CPT | Performed by: PATHOLOGY

## 2023-07-24 PROCEDURE — 80197 ASSAY OF TACROLIMUS: CPT | Performed by: INTERNAL MEDICINE

## 2023-07-24 PROCEDURE — G0463 HOSPITAL OUTPT CLINIC VISIT: HCPCS | Performed by: INTERNAL MEDICINE

## 2023-07-24 PROCEDURE — 85025 COMPLETE CBC W/AUTO DIFF WBC: CPT | Performed by: PATHOLOGY

## 2023-07-24 PROCEDURE — 99000 SPECIMEN HANDLING OFFICE-LAB: CPT | Performed by: PATHOLOGY

## 2023-07-24 PROCEDURE — 84156 ASSAY OF PROTEIN URINE: CPT | Performed by: PATHOLOGY

## 2023-07-24 RX ORDER — MAGNESIUM OXIDE 400 MG/1
400 TABLET ORAL EVERY MORNING
Qty: 90 TABLET | Refills: 1 | COMMUNITY
Start: 2023-07-24 | End: 2023-09-25

## 2023-07-24 RX ORDER — SODIUM BICARBONATE 650 MG/1
1300 TABLET ORAL 3 TIMES DAILY
Qty: 540 TABLET | Refills: 3 | Status: SHIPPED | OUTPATIENT
Start: 2023-07-24 | End: 2023-10-22

## 2023-07-24 RX ORDER — CHOLECALCIFEROL (VITAMIN D3) 50 MCG
1 TABLET ORAL DAILY
Qty: 90 TABLET | Refills: 3 | Status: SHIPPED | OUTPATIENT
Start: 2023-07-24 | End: 2023-10-22

## 2023-07-24 RX ORDER — FAMOTIDINE 20 MG/1
20 TABLET, FILM COATED ORAL DAILY PRN
Qty: 180 TABLET | Refills: 3 | Status: ON HOLD | COMMUNITY
Start: 2023-07-24 | End: 2023-08-15

## 2023-07-24 ASSESSMENT — PAIN SCALES - GENERAL: PAINLEVEL: NO PAIN (0)

## 2023-07-24 NOTE — LETTER
7/24/2023         RE: Mona Wagner  [address]  [place] [address]        Dear Colleague,    Thank you for referring your patient, Mona Wagner, to the [facility]. Please see a copy of my visit note below.    TRANSPLANT NEPHROLOGY CHRONIC POST TRANSPLANT VISIT    Assessment & Plan  # DDKT: Trend up in creatinine but still within baseline   - Baseline Creatinine:  ~ 1.2-1.4 currently. Pre-pregnancy was 0.8-1.1   - Proteinuria: Mild (0.5-1.0 grams). 0.88 7/24/23, trend up   - Date DSA Last Checked: Sep/2022      Latest DSA: No cPRA: 25%   - BK Viremia: No   - Kidney Tx Biopsy: Sep 17, 2019; Result: No diagnostic evidence of acute rejection.   -Labs twice per week    # Immunosuppression: Tacrolimus immediate release (goal 4-6) and Azathioprine (dose 150 mg daily)   - Continue with intensive monitoring of immunosuppression for efficacy and toxicity.   - Changes: Not at this time    # Infection Prophylaxis:   Last CD4 Level: 201 (Jul/2020)  - PJP: None    # Blood Pressure: Controlled;  Goal BP: > 100, but < 130 systolic   - Changes: Not at this time; Patient was on Florinef prior to pregnancy.  Would encourage good hydration.    # Anemia in Chronic Renal Disease: Hgb: Trend up      CHARLINE: No   - Iron studies: Replete    # Mineral Bone Disorder: Patient is s/p parathyroidectomy 4/2023 with 3.5 glands removed.  - Tertiary renal hyperparathyroidism; PTH level:  Low (<15 pg/ml)         On treatment: None; Calcitriol stopped with high normal serum calcium level and low PTH.  - Vitamin D; level: Low        On supplement: No; Restart cholecalciferol 2000 units daily   - Calcium; level: Normal        On supplement: No    # Electrolytes:   - Potassium; level: High normal        On supplement: No  - Magnesium; level: Normal        On supplement: Yes, decrease mag ox to 800mg daily   - Bicarbonate; level: Low normal        On supplement: Yes, bicarb 1300mg bid, increase to TID     # Hyponatremia:     -Slightly low serum sodium level. Stable    # Hyperkalemia:   -Increase bicarb to 1300mg tid   -Continue patiromer PRN for K >5.5 (took on 7/20/23)     # Ureteral Stenosis/Hydronephrosis: Patient with moderate hydronephrosis of kidney transplant and developed CAMERON.  She underwent PNT 6/9/23 with repeat kidney transplant ultrasound 6/11 still showing moderate hydronephrosis suggesting this may be a functional hydronephrosis from pregnancy.  However, patient does report resolution of edema and dyspnea, as well as apparent post-obstructive diuresis.  She has a previous h/o ureteral stenosis with ureteral stent removed 2/2021.  Previous kidney transplant ultrasound 7/2021 also showed moderate hydronephrosis unchanged with voiding. Hydronephrosis was unchanged on 7/6/22 abdominal CT.     -Most recent U/S 7/5/23 with improvement in hydronephrosis with PNT in place    # E. Coli bacteremia:   -Admitted 7/3/23 for E.coli bacteremia. S/p treatment with IV ceftriaxone x7d   -Now on keflex for secondary ppx until post partum    # Pregnancy: Patient is 31 weeks 3 days pregnant. Followed by MFM.    # GERD: Controlled on H2 blocler.    # Hyperprolactinemia: Normal prolactin level with last check.  Felt secondary to hypothyroidism versus kidney failure, or less likely now a pituitary microadenoma.  Followed by Endocrinology.    # Right Axillary Lymph Node: Patient was referred to general surgery for excisional biopsy in 8/2022, but decision was made to watch the node.  Node appears to be stable for the last year at least.   - Would consider excisional biopsy post partum.    # Skin Cancer Risk:    - Discussed sun protection and recommend regular follow up with Dermatology.    # Medical Compliance: Yes    # Health Maintenance and Vaccination Review: Not Reviewed    # Transplant History:  Etiology of Kidney Failure: Unknown etiology (no kidney biopsy)  Tx: DDKT  Transplant: 8/3/2019 (Kidney)  Significant changes in  immunosuppression:  Changed from mycophenolate to azathioprine for pregnancy.  Significant transplant-related complications: Transplant ureteral stenosis    Transplant Office Phone Number: [phone]    Assessment and plan was discussed with the patient and she voiced her understanding and agreement.    Return visit: Return in 24 days (on 8/17/2023) for previously scheduled visit.    Gelacio Mcintyre MD    I spent 52 minutes on the date of the encounter doing chart review, review of outside records, review of test results, interpretation of tests, patient visit, and documentation    Chief Complaint  Ms. Wagner is a 32 year old here for kidney transplant and immunosuppression management.    History of Present Illness  Mr. Wagner is now 31w3d into her pregnancy. She was admitted 7/2-7/5 due to concern for impending pyelonephritis with fever and increasing sediment in PNT bag. She was found to have E.coli bacteremia from urinary source. ID was consulted and she received IV ceftriaxone x7 days. She was seen by Dr. De La Torre with txp ID and decision was made to trial secondary ppx with keflex but if patient was to develop itching she would stop.     She has been having more reflux. She has been intermittently having tingling in her fingertips that happens in the middle of the night. She states that because of the low K diet she is not eating as much as she should. She thinks that urine in the PNT bag is clear. She is having some chest discomfort that she associates with GERD.     Home BP:  117-130 systolic    Problem List  Patient Active Problem List   Diagnosis    DVT of upper extremity (deep vein thrombosis) (H)    Seizure disorder (H)    Acquired hypothyroidism    Increased prolactin level    Aftercare following organ transplant    Immunosuppressed status (H)    Kidney replaced by transplant    Anxiety    Vitamin D deficiency    CKD (chronic kidney disease) stage 3, GFR 30-59 ml/min (H)    Bicornate uterus    Ureteral  stenosis    Orthostatic hypotension    Tertiary hyperparathyroidism (H)    High-risk pregnancy in second trimester    Dehydration    Stage 3a chronic kidney disease (H)    Anemia of chronic renal failure    Elevated blood pressure affecting pregnancy in second trimester, antepartum     contractions    Encounter for triage in pregnant patient    Bacteremia       Allergies  Allergies   Allergen Reactions    Contrast Dye Rash     CT contrast allergy 19 rash over eyes. Need to have pre medication before a CT WITH CONTRAST     Diatrizoate Rash     CT contrast allergy 19 rash over eyes. Need to have pre medication before a CT WITH CONTRAST     Lisinopril Swelling     angioedema    Nitrous Oxide Other (See Comments)     Sense of doom    Chlorhexidine Rash     Rash at site    Sulfa Antibiotics Rash     Muscle stiffness of neck    Dapsone      Methemoglobinemia    Furosemide Other (See Comments)     Skin flushing    Metrogel [Metronidazole]      Hives diffusely on body after vaginal administration.    Azithromycin Dizziness and Rash    Cefuroxime Rash       Medications  Current Outpatient Medications   Medication Sig    acetaminophen (TYLENOL) 325 MG tablet Take 2 tablets (650 mg) by mouth every 4 hours as needed for mild pain    aspirin (ASA) 81 MG chewable tablet Take 1 tablet (81 mg) by mouth daily    azaTHIOprine (IMURAN) 50 MG tablet Take 3 tablets (150 mg) by mouth daily    cephALEXin (KEFLEX) 500 MG capsule Take 1 capsule (500 mg) by mouth daily    cyanocobalamin (VITAMIN B-12) 1000 MCG tablet Take 1 tablet (1,000 mcg) by mouth daily    famotidine (PEPCID) 20 MG tablet Take 1 tablet (20 mg) by mouth 2 times daily    hydrOXYzine (ATARAX) 10 MG tablet Take 1 tablet (10 mg) by mouth 3 times daily as needed for itching    levothyroxine (SYNTHROID/LEVOTHROID) 50 MCG tablet Take 1 tablet (50 mcg) by mouth daily    magnesium oxide (MAG-OX) 400 MG tablet Take 2 tablets (800 mg) by mouth 2 times daily     patiromer (VELTASSA) 8.4 g packet Take 8.4 g by mouth daily as needed (as directed by your transplant team, for potassium = or > 5.5)    polyethylene glycol (MIRALAX) 17 GM/Dose powder Take 17 g (1 capful) by mouth daily as needed for constipation    Prenatal Vit-Fe Fumarate-FA (PRENATAL MULTIVITAMIN W/IRON) 27-0.8 MG tablet Take 1 tablet by mouth every evening    simethicone (MYLICON) 125 MG chewable tablet Take 1 tablet (125 mg) by mouth 4 times daily as needed for intestinal gas    sodium bicarbonate 650 MG tablet Take 2 tablets (1,300 mg) by mouth 2 times daily    tacrolimus (GENERIC EQUIVALENT) 0.5 MG capsule Take 1 capsule (0.5 mg) by mouth 2 times daily Total dose = 5.5mg twice daily.    tacrolimus (GENERIC EQUIVALENT) 1 MG capsule Take 2 capsules (2 mg) by mouth 2 times daily HOLD for dose change. (Patient not taking: Reported on 7/19/2023)    tacrolimus (GENERIC EQUIVALENT) 5 MG capsule Take 1 capsule (5 mg) by mouth 2 times daily Total dose = 5.5mg twice daily     No current facility-administered medications for this visit.     Medications Discontinued During This Encounter   Medication Reason    famotidine (PEPCID) 20 MG tablet Reorder (No AVS)    magnesium oxide (MAG-OX) 400 MG tablet Reorder (No AVS)    sodium bicarbonate 650 MG tablet Reorder (No AVS)       Physical Exam  Vital Signs: /80 (BP Location: Right arm, Patient Position: Sitting, Cuff Size: Adult Regular)   Pulse 91   Temp 98.1  F (36.7  C) (Oral)   Wt 80.2 kg (176 lb 12.8 oz)   LMP 12/16/2022   SpO2 99%   BMI 34.53 kg/m      GENERAL APPEARANCE: alert and no distress  HENT: mouth without ulcers or lesions  LYMPHATICS: no cervical or supraclavicular nodes; small ~ 0.5 cm right axillary lymph node, non tender  RESP: lungs clear to auscultation - no rales, rhonchi or wheezes  CV: regular rhythm, normal rate, no rub, 2/6 systolic murmur  EDEMA: no LE edema bilaterally  ABDOMEN: soft, nondistended, nontender, bowel sounds normal  MS:  extremities normal - no gross deformities noted, no evidence of inflammation in joints, no muscle tenderness  SKIN: no rash  TX KIDNEY: normal  DIALYSIS ACCESS:  LUE AV fistula with good thrill      Data        Latest Ref Rng & Units 7/20/2023    10:11 AM 7/17/2023    10:24 AM 7/13/2023    10:15 AM   Renal   Sodium 136 - 145 mmol/L 135  136  135    K 3.4 - 5.3 mmol/L 5.6  5.1  5.0    Cl 98 - 107 mmol/L 105  106  104    Cl (external) 98 - 107 mmol/L 105  106  104    CO2 22 - 29 mmol/L 20  20  21    Urea Nitrogen 6.0 - 20.0 mg/dL 23.6  23.6  29.8    Creatinine 0.51 - 0.95 mg/dL 1.29  1.18  1.38    Glucose 70 - 99 mg/dL 78  87  94    Calcium 8.6 - 10.0 mg/dL 9.0  8.9  9.3    Magnesium 1.7 - 2.3 mg/dL   2.3          Latest Ref Rng & Units 7/13/2023    10:15 AM 6/15/2023    10:09 AM 6/12/2023     2:35 AM   Bone Health   Phosphorus 2.5 - 4.5 mg/dL 3.3  3.6  2.6          Latest Ref Rng & Units 7/20/2023    10:11 AM 7/17/2023    10:24 AM 7/13/2023    10:17 AM   Heme   WBC 4.0 - 11.0 10e3/uL 6.8  6.8  6.9    Hgb 11.7 - 15.7 g/dL 9.5  8.6  9.1    Plt 150 - 450 10e3/uL 215  245  289          Latest Ref Rng & Units 7/13/2023    10:15 AM 7/10/2023    10:28 AM 7/4/2023     8:17 AM   Liver   AP 35 - 104 U/L 123  117  96    TBili <=1.2 mg/dL 0.5  0.6  0.5    ALT 0 - 50 U/L 17  13  9    AST 0 - 45 U/L 17  21  13    Tot Protein 6.4 - 8.3 g/dL 7.3  7.5  6.8    Albumin 3.5 - 5.2 g/dL 3.8  3.6  3.3          Latest Ref Rng & Units 2/6/2023    10:23 AM 6/22/2022     6:39 AM 8/4/2020     9:05 AM   Pancreas   A1C <5.7 % 5.2   5.5    Lipase 0 - 52 U/L  39           Latest Ref Rng & Units 5/22/2023    10:23 AM 5/22/2023    10:22 AM 4/25/2023    10:35 AM   Iron studies   Iron 37 - 145 ug/dL  80  49    Iron Sat Index 15 - 46 %  41  27    Ferritin 6 - 175 ng/mL 1,618   1,770          Latest Ref Rng & Units 7/7/2021     9:20 AM 6/2/2021     9:25 AM 5/3/2021     9:02 AM   [facility] Txp Virology   BK Spec  Plasma  Plasma  Plasma    BK Res BKNEG^BK Virus  DNA Not Detected copies/mL BK Virus DNA Not Detected  BK Virus DNA Not Detected  BK Virus DNA Not Detected    BK Log <2.7 Log copies/mL Not Calculated  Not Calculated  Not Calculated         Recent Labs   Lab Test 07/13/23  1017 07/17/23  1024 07/20/23  1011   DOSTAC 7/12/2023 7/16/2023 7/19/2023   TACROL 5.8 4.5* 5.3     Recent Labs   Lab Test 08/16/19  0807 09/03/19  0944   DOSMPA Not Provided 0840pm   MPACID 1.92 3.39   MPAG 149.2* 52.8         Again, thank you for allowing me to participate in the care of your patient.        Sincerely,        Gelacio Mcintyre MD

## 2023-07-24 NOTE — PROGRESS NOTES
Anemia Management Note  SUBJECTIVE/OBJECTIVE:  Referred by Dr. Gelacio Mcintyre on 2023  Primary Diagnosis: Anemia in Chronic Kidney Disease (N18.3, D63.1)   3a  Secondary Diagnosis:  Chronic Kidney Disease, Stage 3 (N18.3)  3a  Hgb goal range:  9-10  Kidney Tx: 8/3/2019  Epo/Darbo: Aranesp 40mcg every 14 days for Hgb <10.0. In Clinic.   Iron regimen:  Prenatal vit with Iron.   *Elevated Ferritin levels.   Labs : 2024  Recent CHARLINE use, transfusion, IV iron: Aranesp .  RX/TX plans : 2024     *Prefers [facility]      Hx of DVT (), High Risk Pregnancy (2nd trimester).     Contact: OK to speak with Stephan Wagner (father) and Savanah Wagner (sister) regarding Medical Information per consent to communicate dated 4/3/2020.        Latest Ref Rng & Units 2023    10:14 AM 7/10/2023    10:28 AM 2023    10:17 AM 2023    10:24 AM 2023    10:11 AM 2023    11:03 AM 2023    10:01 AM   Anemia   CHARLINE Dose       40mcg    Hemoglobin 11.7 - 15.7 g/dL 8.3  9.1  9.1  8.6  9.5   9.9      BP Readings from Last 3 Encounters:   23 125/80   23 109/72   23 128/85     Wt Readings from Last 2 Encounters:   23 80.2 kg (176 lb 12.8 oz)   23 79.1 kg (174 lb 6.4 oz)           ASSESSMENT:  Hgb:at goal - received dose in clinic - recommend continue current regimen  TSat: not at goal (>30%) but ferritin >1000ng/mL.  PO iron not indicated at this time per anemia protocol.   Ferritin: Elevated (>1000ng/mL)    PLAN:  Dose with aranesp and RTC for hgb then aranesp if needed in 2 week(s)    Orders needed to be renewed (for next follow-up date) in EPIC: None    Iron labs due:  End of Aug 2023    Plan discussed with:  No call, chart review       NEXT FOLLOW-UP DATE:  8/3/23 1pm appt    Manjula Mckinnon RN   [facility]  [facility]  [email or web address]   Office : [phone]  Fax: [phone]

## 2023-07-24 NOTE — PATIENT INSTRUCTIONS
Patient Recommendations:  - Decrease magnesium to 800mg daily   -Increase sodium bicarbonate to 1300mg three times daily   -Start cholecalciferol 2000 units daily     Transplant Patient Information  Your Post Transplant Coordinator is: Jennifer Trejo  For non urgent items, we encourage you to contact your coordinator/care team online via [employer]  You and your care team can also contact your transplant coordinator Monday - Friday, 8am - 5pm at [phone] (Option 2 to reach the coordinator or Option 4 to schedule an appointment).  After hours for urgent matters, please call [facility] at [phone].

## 2023-07-24 NOTE — PROGRESS NOTES
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 Pre-pregnancy was 0.8-1.1   - Proteinuria: Mild (0.5-1.0 grams). 0.88 7/24/23, trend up   - Date DSA Last Checked: Sep/2022      Latest DSA: No cPRA: 25%   - BK Viremia: No   - Kidney Tx Biopsy: Sep 17, 2019; Result: No diagnostic evidence of acute rejection.   -Labs twice per week    # Immunosuppression: Tacrolimus immediate release (goal 4-6) and Azathioprine (dose 150 mg daily)   - Continue with intensive monitoring of immunosuppression for efficacy and toxicity.   - Changes: Not at this time    # Infection Prophylaxis:   Last CD4 Level: 201 (Jul/2020)  - PJP: None    # Blood Pressure: Controlled;  Goal BP: > 100, but < 130 systolic   - Changes: Not at this time; Patient was on Florinef prior to pregnancy.  Would encourage good hydration.    # Anemia in Chronic Renal Disease: Hgb: Trend up      CHARLINE: No   - Iron studies: Replete    # Mineral Bone Disorder: Patient is s/p parathyroidectomy 4/2023 with 3.5 glands removed.  - Tertiary renal hyperparathyroidism; PTH level:  Low (<15 pg/ml)         On treatment: None; Calcitriol stopped with high normal serum calcium level and low PTH.  - Vitamin D; level: Low        On supplement: No; Restart cholecalciferol 2000 units daily   - Calcium; level: Normal        On supplement: No    # Electrolytes:   - Potassium; level: High normal        On supplement: No  - Magnesium; level: Normal        On supplement: Yes, decrease mag ox to 800mg daily   - Bicarbonate; level: Low normal        On supplement: Yes, bicarb 1300mg bid, increase to TID     # Hyponatremia:    -Slightly low serum sodium level. Stable    # Hyperkalemia:   -Increase bicarb to 1300mg tid   -Continue patiromer PRN for K >5.5 (took on 7/20/23)     # Ureteral Stenosis/Hydronephrosis: Patient with moderate hydronephrosis of kidney  transplant and developed CAMERON.  She underwent PNT 6/9/23 with repeat kidney transplant ultrasound 6/11 still showing moderate hydronephrosis suggesting this may be a functional hydronephrosis from pregnancy.  However, patient does report resolution of edema and dyspnea, as well as apparent post-obstructive diuresis.  She has a previous h/o ureteral stenosis with ureteral stent removed 2/2021.  Previous kidney transplant ultrasound 7/2021 also showed moderate hydronephrosis unchanged with voiding. Hydronephrosis was unchanged on 7/6/22 abdominal CT.     -Most recent U/S 7/5/23 with improvement in hydronephrosis with PNT in place    # E. Coli bacteremia:   -Admitted 7/3/23 for E.coli bacteremia. S/p treatment with IV ceftriaxone x7d   -Now on keflex for secondary ppx until post partum    # Pregnancy: Patient is 31 weeks 3 days pregnant. Followed by MFM.    # GERD: Controlled on H2 blocler.    # Hyperprolactinemia: Normal prolactin level with last check.  Felt secondary to hypothyroidism versus kidney failure, or less likely now a pituitary microadenoma.  Followed by Endocrinology.    # Right Axillary Lymph Node: Patient was referred to general surgery for excisional biopsy in 8/2022, but decision was made to watch the node.  Node appears to be stable for the last year at least.   - Would consider excisional biopsy post partum.    # Skin Cancer Risk:    - Discussed sun protection and recommend regular follow up with Dermatology.    # Medical Compliance: Yes    # Health Maintenance and Vaccination Review: Not Reviewed    # Transplant History:  Etiology of Kidney Failure: Unknown etiology (no kidney biopsy)  Tx: DDKT  Transplant: 8/3/2019 (Kidney)  Significant changes in immunosuppression:  Changed from mycophenolate to azathioprine for pregnancy.  Significant transplant-related complications: Transplant ureteral stenosis    Transplant Office Phone Number: [phone]    Assessment and plan was discussed with the patient  and she voiced her understanding and agreement.    Return visit: Return in 24 days (on 8/17/2023) for previously scheduled visit.    Gelacio Mcintyre MD    I spent 52 minutes on the date of the encounter doing chart review, review of outside records, review of test results, interpretation of tests, patient visit, and documentation    Chief Complaint   Ms. Wagner is a 32 year old here for kidney transplant and immunosuppression management.    History of Present Illness   Mr. Wagner is now 31w3d into her pregnancy. She was admitted 7/2-7/5 due to concern for impending pyelonephritis with fever and increasing sediment in PNT bag. She was found to have E.coli bacteremia from urinary source. ID was consulted and she received IV ceftriaxone x7 days. She was seen by Dr. De La Torre with txp ID and decision was made to trial secondary ppx with keflex but if patient was to develop itching she would stop.     She has been having more reflux. She has been intermittently having tingling in her fingertips that happens in the middle of the night. She states that because of the low K diet she is not eating as much as she should. She thinks that urine in the PNT bag is clear. She is having some chest discomfort that she associates with GERD.     Home BP:  117-130 systolic    Problem List   Patient Active Problem List   Diagnosis    DVT of upper extremity (deep vein thrombosis) (H)    Seizure disorder (H)    Acquired hypothyroidism    Increased prolactin level    Aftercare following organ transplant    Immunosuppressed status (H)    Kidney replaced by transplant    Anxiety    Vitamin D deficiency    CKD (chronic kidney disease) stage 3, GFR 30-59 ml/min (H)    Bicornate uterus    Ureteral stenosis    Orthostatic hypotension    Tertiary hyperparathyroidism (H)    High-risk pregnancy in second trimester    Dehydration    Stage 3a chronic kidney disease (H)    Anemia of chronic renal failure    Elevated blood pressure affecting pregnancy in second  trimester, antepartum     contractions    Encounter for triage in pregnant patient    Bacteremia       Allergies   Allergies   Allergen Reactions    Contrast Dye Rash     CT contrast allergy 19 rash over eyes. Need to have pre medication before a CT WITH CONTRAST     Diatrizoate Rash     CT contrast allergy 19 rash over eyes. Need to have pre medication before a CT WITH CONTRAST     Lisinopril Swelling     angioedema    Nitrous Oxide Other (See Comments)     Sense of doom    Chlorhexidine Rash     Rash at site    Sulfa Antibiotics Rash     Muscle stiffness of neck    Dapsone      Methemoglobinemia    Furosemide Other (See Comments)     Skin flushing    Metrogel [Metronidazole]      Hives diffusely on body after vaginal administration.    Azithromycin Dizziness and Rash    Cefuroxime Rash       Medications   Current Outpatient Medications   Medication Sig    acetaminophen (TYLENOL) 325 MG tablet Take 2 tablets (650 mg) by mouth every 4 hours as needed for mild pain    aspirin (ASA) 81 MG chewable tablet Take 1 tablet (81 mg) by mouth daily    azaTHIOprine (IMURAN) 50 MG tablet Take 3 tablets (150 mg) by mouth daily    cephALEXin (KEFLEX) 500 MG capsule Take 1 capsule (500 mg) by mouth daily    cyanocobalamin (VITAMIN B-12) 1000 MCG tablet Take 1 tablet (1,000 mcg) by mouth daily    famotidine (PEPCID) 20 MG tablet Take 1 tablet (20 mg) by mouth 2 times daily    hydrOXYzine (ATARAX) 10 MG tablet Take 1 tablet (10 mg) by mouth 3 times daily as needed for itching    levothyroxine (SYNTHROID/LEVOTHROID) 50 MCG tablet Take 1 tablet (50 mcg) by mouth daily    magnesium oxide (MAG-OX) 400 MG tablet Take 2 tablets (800 mg) by mouth 2 times daily    patiromer (VELTASSA) 8.4 g packet Take 8.4 g by mouth daily as needed (as directed by your transplant team, for potassium = or > 5.5)    polyethylene glycol (MIRALAX) 17 GM/Dose powder Take 17 g (1 capful) by mouth daily as needed for constipation    Prenatal  Vit-Fe Fumarate-FA (PRENATAL MULTIVITAMIN W/IRON) 27-0.8 MG tablet Take 1 tablet by mouth every evening    simethicone (MYLICON) 125 MG chewable tablet Take 1 tablet (125 mg) by mouth 4 times daily as needed for intestinal gas    sodium bicarbonate 650 MG tablet Take 2 tablets (1,300 mg) by mouth 2 times daily    tacrolimus (GENERIC EQUIVALENT) 0.5 MG capsule Take 1 capsule (0.5 mg) by mouth 2 times daily Total dose = 5.5mg twice daily.    tacrolimus (GENERIC EQUIVALENT) 1 MG capsule Take 2 capsules (2 mg) by mouth 2 times daily HOLD for dose change. (Patient not taking: Reported on 7/19/2023)    tacrolimus (GENERIC EQUIVALENT) 5 MG capsule Take 1 capsule (5 mg) by mouth 2 times daily Total dose = 5.5mg twice daily     No current facility-administered medications for this visit.     Medications Discontinued During This Encounter   Medication Reason    famotidine (PEPCID) 20 MG tablet Reorder (No AVS)    magnesium oxide (MAG-OX) 400 MG tablet Reorder (No AVS)    sodium bicarbonate 650 MG tablet Reorder (No AVS)       Physical Exam   Vital Signs: /80 (BP Location: Right arm, Patient Position: Sitting, Cuff Size: Adult Regular)   Pulse 91   Temp 98.1  F (36.7  C) (Oral)   Wt 80.2 kg (176 lb 12.8 oz)   LMP 12/16/2022   SpO2 99%   BMI 34.53 kg/m      GENERAL APPEARANCE: alert and no distress  HENT: mouth without ulcers or lesions  LYMPHATICS: no cervical or supraclavicular nodes; small ~ 0.5 cm right axillary lymph node, non tender  RESP: lungs clear to auscultation - no rales, rhonchi or wheezes  CV: regular rhythm, normal rate, no rub, 2/6 systolic murmur  EDEMA: no LE edema bilaterally  ABDOMEN: soft, nondistended, nontender, bowel sounds normal  MS: extremities normal - no gross deformities noted, no evidence of inflammation in joints, no muscle tenderness  SKIN: no rash  TX KIDNEY: normal  DIALYSIS ACCESS:  LUE AV fistula with good thrill      Data         Latest Ref Rng & Units 7/20/2023    10:11 AM  7/17/2023    10:24 AM 7/13/2023    10:15 AM   Renal   Sodium 136 - 145 mmol/L 135  136  135    K 3.4 - 5.3 mmol/L 5.6  5.1  5.0    Cl 98 - 107 mmol/L 105  106  104    Cl (external) 98 - 107 mmol/L 105  106  104    CO2 22 - 29 mmol/L 20  20  21    Urea Nitrogen 6.0 - 20.0 mg/dL 23.6  23.6  29.8    Creatinine 0.51 - 0.95 mg/dL 1.29  1.18  1.38    Glucose 70 - 99 mg/dL 78  87  94    Calcium 8.6 - 10.0 mg/dL 9.0  8.9  9.3    Magnesium 1.7 - 2.3 mg/dL   2.3          Latest Ref Rng & Units 7/13/2023    10:15 AM 6/15/2023    10:09 AM 6/12/2023     2:35 AM   Bone Health   Phosphorus 2.5 - 4.5 mg/dL 3.3  3.6  2.6          Latest Ref Rng & Units 7/20/2023    10:11 AM 7/17/2023    10:24 AM 7/13/2023    10:17 AM   Heme   WBC 4.0 - 11.0 10e3/uL 6.8  6.8  6.9    Hgb 11.7 - 15.7 g/dL 9.5  8.6  9.1    Plt 150 - 450 10e3/uL 215  245  289          Latest Ref Rng & Units 7/13/2023    10:15 AM 7/10/2023    10:28 AM 7/4/2023     8:17 AM   Liver   AP 35 - 104 U/L 123  117  96    TBili <=1.2 mg/dL 0.5  0.6  0.5    ALT 0 - 50 U/L 17  13  9    AST 0 - 45 U/L 17  21  13    Tot Protein 6.4 - 8.3 g/dL 7.3  7.5  6.8    Albumin 3.5 - 5.2 g/dL 3.8  3.6  3.3          Latest Ref Rng & Units 2/6/2023    10:23 AM 6/22/2022     6:39 AM 8/4/2020     9:05 AM   Pancreas   A1C <5.7 % 5.2   5.5    Lipase 0 - 52 U/L  39           Latest Ref Rng & Units 5/22/2023    10:23 AM 5/22/2023    10:22 AM 4/25/2023    10:35 AM   Iron studies   Iron 37 - 145 ug/dL  80  49    Iron Sat Index 15 - 46 %  41  27    Ferritin 6 - 175 ng/mL 1,618   1,770          Latest Ref Rng & Units 7/7/2021     9:20 AM 6/2/2021     9:25 AM 5/3/2021     9:02 AM   [facility] Txp Virology   BK Spec  Plasma  Plasma  Plasma    BK Res BKNEG^BK Virus DNA Not Detected copies/mL BK Virus DNA Not Detected  BK Virus DNA Not Detected  BK Virus DNA Not Detected    BK Log <2.7 Log copies/mL Not Calculated  Not Calculated  Not Calculated         Recent Labs   Lab Test 07/13/23  1017 07/17/23  1024  07/20/23  1011   DOSTAC 7/12/2023 7/16/2023 7/19/2023   TACROL 5.8 4.5* 5.3     Recent Labs   Lab Test 08/16/19  0807 09/03/19  0944   DOSMPA Not Provided 0840pm   MPACID 1.92 3.39   MPAG 149.2* 52.8

## 2023-07-24 NOTE — NURSING NOTE
Chief Complaint   Patient presents with    RECHECK   /80 (BP Location: Right arm, Patient Position: Sitting, Cuff Size: Adult Regular)   Pulse 91   Temp 98.1  F (36.7  C) (Oral)   Wt 80.2 kg (176 lb 12.8 oz)   LMP 12/16/2022   SpO2 99%   BMI 34.53 kg/m

## 2023-07-26 NOTE — TELEPHONE ENCOUNTER
FUTURE VISIT INFORMATION      SURGERY INFORMATION:  PAC eval anesthesia for pregnancy, due in Sep 22 at [facility]       RECORDS REQUESTED FROM:       Primary Care Provider: Macarena Bowen MD - [facility]    Pertinent Medical History: DVT    Most recent EKG+ Tracin23    Most recent ECHO: 3/20/23    Most recent Cardiac Stress Test: 22    Most recent Sleep Study:  10/8/21

## 2023-07-27 ENCOUNTER — OFFICE VISIT (OUTPATIENT)
Dept: MATERNAL FETAL MEDICINE | Facility: CLINIC | Age: 33
End: 2023-07-27
Attending: ADVANCED PRACTICE MIDWIFE
Payer: MEDICARE

## 2023-07-27 ENCOUNTER — HOSPITAL ENCOUNTER (OUTPATIENT)
Dept: ULTRASOUND IMAGING | Facility: CLINIC | Age: 33
Discharge: HOME | End: 2023-07-27
Attending: ADVANCED PRACTICE MIDWIFE
Payer: MEDICARE

## 2023-07-27 ENCOUNTER — LAB (OUTPATIENT)
Dept: LAB | Facility: HOSPITAL | Age: 33
End: 2023-07-27
Payer: MEDICARE

## 2023-07-27 DIAGNOSIS — N18.30 STAGE 3 CHRONIC KIDNEY DISEASE, UNSPECIFIED WHETHER STAGE 3A OR 3B CKD (H): ICD-10-CM

## 2023-07-27 DIAGNOSIS — O09.92 HIGH-RISK PREGNANCY IN SECOND TRIMESTER: ICD-10-CM

## 2023-07-27 DIAGNOSIS — E03.9 ACQUIRED HYPOTHYROIDISM: ICD-10-CM

## 2023-07-27 DIAGNOSIS — L29.9 ITCHING: ICD-10-CM

## 2023-07-27 DIAGNOSIS — Z94.0 KIDNEY REPLACED BY TRANSPLANT: ICD-10-CM

## 2023-07-27 DIAGNOSIS — Z48.298 AFTERCARE FOLLOWING ORGAN TRANSPLANT: ICD-10-CM

## 2023-07-27 DIAGNOSIS — O09.893 CURRENT PREGNANCY IN THIRD TRIMESTER WITH HISTORY OF KIDNEY TRANSPLANTATION: Primary | ICD-10-CM

## 2023-07-27 DIAGNOSIS — Z94.0 CURRENT PREGNANCY IN THIRD TRIMESTER WITH HISTORY OF KIDNEY TRANSPLANTATION: Primary | ICD-10-CM

## 2023-07-27 LAB
ANION GAP SERPL CALCULATED.3IONS-SCNC: 11 MMOL/L (ref 7–15)
BUN SERPL-MCNC: 23.8 MG/DL (ref 6–20)
CALCIUM SERPL-MCNC: 8.6 MG/DL (ref 8.6–10)
CHLORIDE SERPL-SCNC: 104 MMOL/L (ref 98–107)
CREAT SERPL-MCNC: 1.26 MG/DL (ref 0.51–0.95)
DEPRECATED HCO3 PLAS-SCNC: 22 MMOL/L (ref 22–29)
ERYTHROCYTE [DISTWIDTH] IN BLOOD BY AUTOMATED COUNT: 14.8 % (ref 10–15)
GFR SERPL CREATININE-BSD FRML MDRD: 59 ML/MIN/1.73M2
GLUCOSE SERPL-MCNC: 85 MG/DL (ref 70–99)
HCT VFR BLD AUTO: 31.2 % (ref 35–47)
HGB BLD-MCNC: 9.6 G/DL (ref 11.7–15.7)
MCH RBC QN AUTO: 30.5 PG (ref 26.5–33)
MCHC RBC AUTO-ENTMCNC: 30.8 G/DL (ref 31.5–36.5)
MCV RBC AUTO: 99 FL (ref 78–100)
PLATELET # BLD AUTO: 191 10E3/UL (ref 150–450)
POTASSIUM SERPL-SCNC: 4.7 MMOL/L (ref 3.4–5.3)
RBC # BLD AUTO: 3.15 10E6/UL (ref 3.8–5.2)
SODIUM SERPL-SCNC: 137 MMOL/L (ref 136–145)
TACROLIMUS BLD-MCNC: 6.5 UG/L (ref 5–15)
TME LAST DOSE: NORMAL H
TME LAST DOSE: NORMAL H
WBC # BLD AUTO: 6.7 10E3/UL (ref 4–11)

## 2023-07-27 PROCEDURE — 82310 ASSAY OF CALCIUM: CPT

## 2023-07-27 PROCEDURE — 36415 COLL VENOUS BLD VENIPUNCTURE: CPT

## 2023-07-27 PROCEDURE — 85014 HEMATOCRIT: CPT

## 2023-07-27 PROCEDURE — 82248 BILIRUBIN DIRECT: CPT

## 2023-07-27 PROCEDURE — 76819 FETAL BIOPHYS PROFIL W/O NST: CPT

## 2023-07-27 PROCEDURE — 84443 ASSAY THYROID STIM HORMONE: CPT

## 2023-07-27 PROCEDURE — 80197 ASSAY OF TACROLIMUS: CPT

## 2023-07-27 PROCEDURE — 76818 FETAL BIOPHYS PROFILE W/NST: CPT | Mod: 26 | Performed by: OBSTETRICS & GYNECOLOGY

## 2023-07-27 NOTE — PROGRESS NOTES
"Video-Visit Details    Type of service:  Video Visit    Virtual visit conducted by Naya.      Originating Location (pt. Location): HOME    Distant Location (provider location):  Off site    Mode of Communication:  Video Conference via [employer]    Physician has received verbal consent for a Video Visit from the patient? YES      Katrin Lopez MD     Start time 1258, stop time 108, chart review, documentation time, coordination of care, 20 minutes, total time spent day of the encounter 30 minutes    Video-Visit Details  Katrin Lopez MD  7/28/23    HPI  #1 history of hypothyroidism  Report, she had a history of hypothyroidism that was diagnosed when she had \"no from her nipples\" with associated high prolactin and presumably, high TSH.  She was then placed on levothyroxine and her nipple discharge and prolactin subsequently improved. Prior to pregnancy, the patient had been on levothyroxine 25 mcg daily 4 days a week, and 37.5 mcg daily 3 days/week. June 2023 TSH 2.51.    Interval history:   Patient has been titrating up her levothyroxine in the setting of pregnancy.  July 2023 TSH 1.32.  Currently on levothyroxine 50 mcg daily.     #2 current pregnancy-32 weeks  Reviewing her chart, this has been planned for many months, with the multidisciplinary team involved.  She reports oral contraceptive use from roughly 7828-1767.  Her oral contraceptive use was stopped in 2022 and she reports regular menses.  She is being followed by nephrology, and maternal-fetal medicine.      Interval history: Patient currently at 32 weeks.  She anticipates vaginal delivery at 37 weeks.  Working closely with [facility].  Has gained about 14 lbs.      #3 history of prediabetes-February 2023 hemoglobin A1c at 5.2  The patient reports history of prediabetes.  Reviewing the patient's chart, August 2019 hemoglobin A1c was 4.8, February 2020 hemoglobin A1c 5.7, in April 2020 hemoglobin A1c was 5.5. February 2023 hemoglobin A1c of " 5.2.    Interval history: July 2023 oral glucose tolerance test unremarkable, with 3-hour post glucose (100 g load) at 117.    #4 history of renal transplant in August 2019, now with likely tertiary hyperparathyroidism  The patient has a history of end-stage renal disease, due to suspected IgA nephropathy for which she was previously on dialysis and underwent recent on transplant in August 2019.    Interval history: Currently managed by nephrology.  Has hydronephrosis and currently has a stent in place (as of June 2023) which was complicated by pyelonephritis in July 2023.     #5 history of high prolactin  Per chart, there is a history of prolactin at 43 in April 2016, 38 in February 2017, 38 in March 2018, 51 in June 2019, 9 in November 2019, 14 and April 2020, 14 in June 2021, 20.9 November 2021, and 17 in July 2022.  2017 had MRI did not clearly show a macroadenoma, although microadenoma cannot be excluded.  No contrast was used.    Interval history: This has normalized with treatment of TFTs.    #6 history of hypercalcemia, now with likely tertiary hyperparathyroidism, status post surgery removal of 3.5 parathyroid glands in April 2023.   This is thought to due to renal related secondary hyperparathyroidism.  Had been on Sensipar, however this was stopped as she was trying to become pregnant.  Nephrology felt no need to intervene on serum calcium greater than 11.  January 2023 PTH is 79, serum calcium was 10.8. Potentially, the consideration was for Cinacalcet use with the hypercalcemia (calcium as high as 11.2).  However the patient is concerned about the effect on pregnancy.  As she is in the second trimester, she has met with ENT .The patient is now status post removal of 3.5 parathyroid glands in April 2023.     Interval history: Subsequent calcium levels since parathyroid surgery has remained normal.    #7 itching  The patient reports intense itching of her hands and feet.    Past Medical History  Past  Medical History:   Diagnosis Date    Anemia in chronic kidney disease     Bacteremia 7/5/2023    History of bacterial endocarditis     History of blood transfusion     History of DVT (deep vein thrombosis) 2014    History of seizure 2014    History of subarachnoid hemorrhage     Hydronephrosis     Hypothyroidism     Kidney transplanted 08/03/2019    DCD DDKT. Induction with thymo 6mg/kg.    Orthostatic hypotension     FATOUMATA (obstructive sleep apnea)     Pyelonephritis of transplanted kidney 01/23/2020    Secondary renal hyperparathyroidism (H)     Urinary tract infection        Allergies  Allergies   Allergen Reactions    Contrast Dye Rash     CT contrast allergy 12/14/19 rash over eyes. Need to have pre medication before a CT WITH CONTRAST     Diatrizoate Rash     CT contrast allergy 12/14/19 rash over eyes. Need to have pre medication before a CT WITH CONTRAST     Lisinopril Swelling     angioedema    Nitrous Oxide Other (See Comments)     Sense of doom    Chlorhexidine Rash     Rash at site    Sulfa Antibiotics Rash     Muscle stiffness of neck    Dapsone      Methemoglobinemia    Furosemide Other (See Comments)     Skin flushing    Metrogel [Metronidazole]      Hives diffusely on body after vaginal administration.    Azithromycin Dizziness and Rash    Cefuroxime Rash     Medications  Current Outpatient Medications   Medication Sig Dispense Refill    acetaminophen (TYLENOL) 325 MG tablet Take 2 tablets (650 mg) by mouth every 4 hours as needed for mild pain 100 tablet 3    aspirin (ASA) 81 MG chewable tablet Take 1 tablet (81 mg) by mouth daily 90 tablet 3    azaTHIOprine (IMURAN) 50 MG tablet Take 3 tablets (150 mg) by mouth daily 270 tablet 3    cephALEXin (KEFLEX) 500 MG capsule Take 1 capsule (500 mg) by mouth daily 90 capsule 1    cyanocobalamin (VITAMIN B-12) 1000 MCG tablet Take 1 tablet (1,000 mcg) by mouth daily 90 tablet 1    famotidine (PEPCID) 20 MG tablet Take 1 tablet (20 mg) by mouth daily as needed  (dyspepsia) 180 tablet 3    hydrOXYzine (ATARAX) 10 MG tablet Take 1 tablet (10 mg) by mouth 3 times daily as needed for itching 30 tablet 1    levothyroxine (SYNTHROID/LEVOTHROID) 50 MCG tablet Take 1 tablet (50 mcg) by mouth daily 90 tablet 1    magnesium oxide (MAG-OX) 400 MG tablet Take 2 tablets (800 mg) by mouth daily 90 tablet 1    patiromer (VELTASSA) 8.4 g packet Take 8.4 g by mouth daily as needed (as directed by your transplant team, for potassium = or > 5.5) 5 packet 1    polyethylene glycol (MIRALAX) 17 GM/Dose powder Take 17 g (1 capful) by mouth daily as needed for constipation 507 g 3    Prenatal Vit-Fe Fumarate-FA (PRENATAL MULTIVITAMIN W/IRON) 27-0.8 MG tablet Take 1 tablet by mouth every evening 90 tablet 3    simethicone (MYLICON) 125 MG chewable tablet Take 1 tablet (125 mg) by mouth 4 times daily as needed for intestinal gas 120 tablet 3    sodium bicarbonate 650 MG tablet Take 2 tablets (1,300 mg) by mouth 3 times daily for 90 days 540 tablet 3    tacrolimus (GENERIC EQUIVALENT) 0.5 MG capsule Take 1 capsule (0.5 mg) by mouth 2 times daily Total dose = 5.5mg twice daily. 60 capsule 11    tacrolimus (GENERIC EQUIVALENT) 1 MG capsule Take 2 capsules (2 mg) by mouth 2 times daily HOLD for dose change. (Patient not taking: Reported on 7/19/2023) 360 capsule 3    tacrolimus (GENERIC EQUIVALENT) 5 MG capsule Take 1 capsule (5 mg) by mouth 2 times daily Total dose = 5.5mg twice daily 60 capsule 11    vitamin D3 (CHOLECALCIFEROL) 50 mcg (2000 units) tablet Take 1 tablet (50 mcg) by mouth daily for 90 days 90 tablet 3     Family History  family history includes Cerebrovascular Disease in her father and sister; Coronary Artery Disease in her father; Diabetes in her sister; Heart Disease in her father; Hypertension in her sister; Kidney Disease in her mother and sister; Kidney failure in her mother; Sleep Apnea in her father.  Social History  Social History     Socioeconomic History    Marital status:  "     Spouse name: Jt Aleman (culturally )    Number of children: 0    Years of education: Not on file    Highest education level: Not on file   Occupational History    Occupation: Pharmacy Technician   Tobacco Use    Smoking status: Never    Smokeless tobacco: Never   Vaping Use    Vaping Use: Never used   Substance and Sexual Activity    Alcohol use: No     Alcohol/week: 0.0 standard drinks of alcohol    Drug use: No    Sexual activity: Yes     Partners: Male   Other Topics Concern    Parent/sibling w/ CABG, MI or angioplasty before 65F 55M? Not Asked   Social History Narrative    Date of Service:5/15/2013     Name: Mona VALDOVINOS (Month, Day, Year of birth): 1990     MRN: [id number]     New Patient: Yes    Preferred Language: English     Needed: No    County of Residence: [place]    Marital Status:     Household size: 8    Number of Dependents:0     Pregnant: 0    Average Monthly Income: $ 600    Citizenship Status: Citizen    Gave Pt [employer] and [employer] applications     Social Determinants of Health     Financial Resource Strain: Not on file   Food Insecurity: Not on file   Transportation Needs: Not on file   Physical Activity: Not on file   Stress: Not on file   Social Connections: Not on file   Intimate Partner Violence: Not on file   Housing Stability: Not on file       ROS  Per HPI    Physical Exam  GENERAL: Healthy, alert and no distress\",\"EYES: Eyes grossly normal to inspection.  No discharge or erythema, or obvious scleral/conjunctival abnormalities.\",\"RESP: No audible wheeze, cough, or visible cyanosis.  No visible retractions or increased work of breathing.  \",\"SKIN: Visible skin clear. No significant rash, abnormal pigmentation or lesions.\",\"NEURO: Cranial nerves grossly intact.  Mentation and speech appropriate for age.\",\"PSYCH: Mentation appears normal, affect normal/bright, judgement and insight intact, normal speech and appearance well-groomed.    RESULTS   "   No results displayed because visit has over 200 results.      Admission on 06/08/2023, Discharged on 06/10/2023   Component Date Value Ref Range Status    AST 06/08/2023 11  10 - 35 U/L Final    ALT 06/08/2023 9 (L)  10 - 35 U/L Final    Culture 06/08/2023 10,000-50,000 CFU/mL Mixture of urogenital anita   Final    ABO/RH(D) 06/08/2023 O POS   Final    Antibody Screen 06/08/2023 Negative  Negative Final    SPECIMEN EXPIRATION DATE 06/08/2023 [id number]   Final    N terminal Pro BNP Inpatient 06/08/2023 416  0 - 450 pg/mL Final    Reference range shown and results flagged as abnormal are suggested inpatient cut points for confirming diagnosis if CHF in an acute setting. Establishing a baseline value for each individual patient is useful for follow-up. An inpatient or emergency department NT-proPBNP <300 pg/mL effectively rules out acute CHF, with 99% negative predictive value.    The outpatient non-acute reference range for ruling out CHF is:  0-125 pg/mL (age 18 to less than 75)  0-450 pg/mL (age 75 yrs and older)     Hold Specimen 06/08/2023 JIC   Final    Sodium 06/09/2023 136  136 - 145 mmol/L Final    Potassium 06/09/2023 4.5  3.4 - 5.3 mmol/L Final    Chloride 06/09/2023 105  98 - 107 mmol/L Final    Carbon Dioxide (CO2) 06/09/2023 20 (L)  22 - 29 mmol/L Final    Anion Gap 06/09/2023 11  7 - 15 mmol/L Final    Urea Nitrogen 06/09/2023 23.9 (H)  6.0 - 20.0 mg/dL Final    Creatinine 06/09/2023 1.43 (H)  0.51 - 0.95 mg/dL Final    Calcium 06/09/2023 8.9  8.6 - 10.0 mg/dL Final    Glucose 06/09/2023 95  70 - 99 mg/dL Final    Alkaline Phosphatase 06/09/2023 69  35 - 104 U/L Final    AST 06/09/2023 9 (L)  10 - 35 U/L Final    ALT 06/09/2023 8 (L)  10 - 35 U/L Final    Protein Total 06/09/2023 6.4  6.4 - 8.3 g/dL Final    Albumin 06/09/2023 3.5  3.5 - 5.2 g/dL Final    Bilirubin Total 06/09/2023 0.4  <=1.2 mg/dL Final    GFR Estimate 06/09/2023 50 (L)  >60 mL/min/1.73m2 Final    eGFR calculated using 2021  CKD-EPI equation.    WBC Count 06/09/2023 7.6  4.0 - 11.0 10e3/uL Final    RBC Count 06/09/2023 2.58 (L)  3.80 - 5.20 10e6/uL Final    Hemoglobin 06/09/2023 7.9 (L)  11.7 - 15.7 g/dL Final    Hematocrit 06/09/2023 24.3 (L)  35.0 - 47.0 % Final    MCV 06/09/2023 94  78 - 100 fL Final    MCH 06/09/2023 30.6  26.5 - 33.0 pg Final    MCHC 06/09/2023 32.5  31.5 - 36.5 g/dL Final    RDW 06/09/2023 13.3  10.0 - 15.0 % Final    Platelet Count 06/09/2023 168  150 - 450 10e3/uL Final    INR 06/09/2023 0.99  0.85 - 1.15 Final    Sodium 06/10/2023 135 (L)  136 - 145 mmol/L Final    Potassium 06/10/2023 4.6  3.4 - 5.3 mmol/L Final    Chloride 06/10/2023 102  98 - 107 mmol/L Final    Carbon Dioxide (CO2) 06/10/2023 22  22 - 29 mmol/L Final    Anion Gap 06/10/2023 11  7 - 15 mmol/L Final    Urea Nitrogen 06/10/2023 21.9 (H)  6.0 - 20.0 mg/dL Final    Creatinine 06/10/2023 1.38 (H)  0.51 - 0.95 mg/dL Final    Calcium 06/10/2023 9.1  8.6 - 10.0 mg/dL Final    Glucose 06/10/2023 97  70 - 99 mg/dL Final    GFR Estimate 06/10/2023 53 (L)  >60 mL/min/1.73m2 Final    eGFR calculated using 2021 CKD-EPI equation.     Case Report   Surgical Pathology Report                         Case: [id number]                                   Authorizing Provider:  Melissa Jones MD   Collected:           04/28/2023 09:16 AM           Ordering Location:     [facility] MA[facility] OR                 Received:            04/28/2023 09:28 AM           Pathologist:           Aisha Walters MD                                                                            Intraop:               Emerald Aquino MD                                                    Specimens:   A) - Parathyroid, Superior, Right, Right superior parathyroid                                        B) - Parathyroid, Inferior, Right                                                                    D)  - Parathyroid, Superior, Left, Left, superior parathyroid                                         E) - Parathyroid, Inferior, Left, parathyroid, Left Inferior                               Final Diagnosis   A. PARATHYROID, RIGHT SUPERIOR, EXCISION:  - Hypercellular parathyroid tissue, 300 mg     B.  PARATHYROID, RIGHT INFERIOR, EXCISION:  - Hypercellular parathyroid tissue, 100 mg     D.  PARATHYROID, LEFT SUPERIOR , EXCISION:  - Hypercellular parathyroid tissue, 200 mg     E.  PARATHYROID, LEFT INFERIOR  EXCISION:  - Hypercellular parathyroid tissue, 200 mg   Electronically signed by Aisha Walters MD on 5/1/2023 at  4:10 PM         Comments:   This is an appended report. These results have been appended to a previously preliminary verified report.      Comment  [facility]   Features are consistent with parathyroid hyperplasia.   Comment: This is an appended report. These results have been appended to a previously preliminary verified        Component      Latest Ref Rng 6/16/2023  7:23 AM   TSH      0.30 - 4.20 uIU/mL 2.51        ASSESSMENT:     #1 hypothyroidism in the setting of pregnancy  We will add TFTs to recent lab draw.  Patient currently on levothyroxine 50 mcg daily.  Patient to recheck labs monthly - TFTs normal. Continue with levothyroxine at 50 mcg daily.     ADDENDUM:    No visits with results within 1 Day(s) from this visit.   Latest known visit with results is:   Lab on 07/27/2023   Component Date Value Ref Range Status    Sodium 07/27/2023 137  136 - 145 mmol/L Final    Potassium 07/27/2023 4.7  3.4 - 5.3 mmol/L Final    Chloride 07/27/2023 104  98 - 107 mmol/L Final    Carbon Dioxide (CO2) 07/27/2023 22  22 - 29 mmol/L Final    Anion Gap 07/27/2023 11  7 - 15 mmol/L Final    Urea Nitrogen 07/27/2023 23.8 (H)  6.0 - 20.0 mg/dL Final    Creatinine 07/27/2023 1.26 (H)  0.51 - 0.95 mg/dL Final    Calcium 07/27/2023 8.6  8.6 - 10.0 mg/dL Final    Glucose 07/27/2023 85  70 - 99 mg/dL Final    GFR  Estimate 07/27/2023 59 (L)  >60 mL/min/1.73m2 Final    WBC Count 07/27/2023 6.7  4.0 - 11.0 10e3/uL Final    RBC Count 07/27/2023 3.15 (L)  3.80 - 5.20 10e6/uL Final    Hemoglobin 07/27/2023 9.6 (L)  11.7 - 15.7 g/dL Final    Hematocrit 07/27/2023 31.2 (L)  35.0 - 47.0 % Final    MCV 07/27/2023 99  78 - 100 fL Final    MCH 07/27/2023 30.5  26.5 - 33.0 pg Final    MCHC 07/27/2023 30.8 (L)  31.5 - 36.5 g/dL Final    RDW 07/27/2023 14.8  10.0 - 15.0 % Final    Platelet Count 07/27/2023 191  150 - 450 10e3/uL Final    Tacrolimus by Tandem Mass Spectrom* 07/27/2023 6.5  5.0 - 15.0 ug/L Final    Comment: Tacrolimus Reference Range (ug/L):    Kidney Transplant:  Pediatric  0-3 months post transplant: 10-12  3-6 months post transplant: 8-10  6-12 months post transplant: 6-8  >12 months post transplant: 4-7    Adult  0-6 months post transplant: 8-10  6-12 months post transplant: 6-8  >12 months post transplant: 4-6  >5 years post transplant: 3-5    Heart Transplant:  Pediatric  0-12 months post transplant: 10-15  >12 months post transplant: 5-10    Adult  0-3 months post transplant: 10-15  3-6 months post transplant: 8-12  6-12 months post transplant: 6-12  >12 months post transplant: 6-10    Lung Transplant:  0-12 months post transplant: 10-15  >12 months post transplant: 8-12    Liver Transplant:  Pediatric  0-3 months post transplant: 10-15  3-6 months post transplant: 8-10  6 months-5 years post transplant: 6-8   >5 years post transplant: 1-3    Adult  0-3 months post transplant: 10-12  3-6 months post transplant: 8-10  >6 months post transplant: 6-8    Pancreas Transplant:  0-6                            months post transplant: 8-10  >6 months post transplant: 5-8    Tacrolimus Last Dose Date 07/27/2023 7/26/2023   Final    Tacrolimus Last Dose Time 07/27/2023 10:00 PM   Final    TSH 07/27/2023 1.83  0.30 - 4.20 uIU/mL Final    Bilirubin Direct 07/27/2023 0.33 (H)  0.00 - 0.30 mg/dL Final    Bilirubin Total 07/27/2023  0.9  <=1.2 mg/dL Final         #2 current pregnancy  Congratulated patient.       #3 history of prediabetes  No evidence for gestational diabetes at this time.    #4 history of renal transplant in August 2019  Per nephrology.  Noted stent as well as history of pyelonephritis.     #5 history of high prolactin  This has resolved with treatment of her hypothyroidism    #6 history of hypercalcemia, now with likely tertiary hyperparathyroidism, status post surgery removal of 3.5 parathyroid glands in April 2023.   Her hypercalcemia has resolved.  She does have some itching as noted below.  Serum calciums have remained normal.  She could consider taking her liquid calcium 2.5 ml per day (translates to roughly 625 mg daily).    #7 itching  Recommended patient try Vanicream lite for her hands, take calcium 2.5 ml (625 mg)  daily, and if needed, add Sarna cream or topical hydrocortisone (2.5%) as needed.  Prescription for hydrocortisone sent to pharmacy.  I will also add bilirubin to recent lab draw.    ADDENDUM: direct bilirubin higher - will let Ob know.     Return in 3 months.

## 2023-07-27 NOTE — PROGRESS NOTES
Please refer to ultrasound report under 'Imaging' Studies of 'Chart Review' tabs.    Twin Brandt M.D.

## 2023-07-28 ENCOUNTER — OFFICE VISIT (OUTPATIENT)
Dept: UROLOGY | Facility: CLINIC | Age: 33
End: 2023-07-28
Attending: UROLOGY
Payer: MEDICARE

## 2023-07-28 ENCOUNTER — VIRTUAL VISIT (OUTPATIENT)
Dept: ENDOCRINOLOGY | Facility: CLINIC | Age: 33
End: 2023-07-28
Payer: MEDICARE

## 2023-07-28 ENCOUNTER — PRE VISIT (OUTPATIENT)
Dept: UROLOGY | Facility: CLINIC | Age: 33
End: 2023-07-28

## 2023-07-28 VITALS
HEART RATE: 76 BPM | WEIGHT: 175 LBS | SYSTOLIC BLOOD PRESSURE: 131 MMHG | DIASTOLIC BLOOD PRESSURE: 87 MMHG | BODY MASS INDEX: 34.36 KG/M2 | HEIGHT: 60 IN

## 2023-07-28 DIAGNOSIS — N13.30 HYDRONEPHROSIS OF KIDNEY TRANSPLANT: Primary | ICD-10-CM

## 2023-07-28 DIAGNOSIS — N13.1 HYDRONEPHROSIS WITH URETERAL STRICTURE, NOT ELSEWHERE CLASSIFIED: ICD-10-CM

## 2023-07-28 DIAGNOSIS — Z94.0 KIDNEY REPLACED BY TRANSPLANT: Primary | ICD-10-CM

## 2023-07-28 DIAGNOSIS — O09.92 HIGH-RISK PREGNANCY IN SECOND TRIMESTER: ICD-10-CM

## 2023-07-28 DIAGNOSIS — L29.9 ITCHING: ICD-10-CM

## 2023-07-28 DIAGNOSIS — T86.19 HYDRONEPHROSIS OF KIDNEY TRANSPLANT: Primary | ICD-10-CM

## 2023-07-28 DIAGNOSIS — E03.9 ACQUIRED HYPOTHYROIDISM: Primary | ICD-10-CM

## 2023-07-28 DIAGNOSIS — E58 CALCIUM DEFICIENCY: ICD-10-CM

## 2023-07-28 LAB
BILIRUB DIRECT SERPL-MCNC: 0.33 MG/DL (ref 0–0.3)
BILIRUB SERPL-MCNC: 0.9 MG/DL
TSH SERPL DL<=0.005 MIU/L-ACNC: 1.83 UIU/ML (ref 0.3–4.2)

## 2023-07-28 PROCEDURE — 99214 OFFICE O/P EST MOD 30 MIN: CPT | Performed by: UROLOGY

## 2023-07-28 PROCEDURE — 99214 OFFICE O/P EST MOD 30 MIN: CPT | Mod: VID | Performed by: INTERNAL MEDICINE

## 2023-07-28 RX ORDER — CALCIUM CARBONATE 1250 MG/5ML
625 SUSPENSION ORAL DAILY
Qty: 500 ML | Refills: 1 | Status: SHIPPED
Start: 2023-07-28 | End: 2023-10-11

## 2023-07-28 RX ORDER — HYDROCORTISONE 2.5 %
CREAM (GRAM) TOPICAL 2 TIMES DAILY
Qty: 30 G | Refills: 3 | Status: SHIPPED | OUTPATIENT
Start: 2023-07-28 | End: 2023-09-08

## 2023-07-28 ASSESSMENT — PAIN SCALES - GENERAL: PAINLEVEL: NO PAIN (0)

## 2023-07-28 NOTE — NURSING NOTE
Is the patient currently in the state of [place]? YES    Visit mode:VIDEO    If the visit is dropped, the patient can be reconnected by: VIDEO VISIT: Text to cell phone: [phone]    Will anyone else be joining the visit? NO      How would you like to obtain your AVS? MyChart    Are changes needed to the allergy or medication list? NO    Reason for visit: RECHECK and Video Visit

## 2023-07-28 NOTE — LETTER
7/28/2023       RE: Mona Wagner  [address]  [facility] [address]     Dear Colleague,    Thank you for referring your patient, Mona Wagner, to the [facility] [place] [facility]. Please see a copy of my visit note below.      Urology Clinic  MARCUS Britt MD  Jul 28, 2023  32 year old female    ASSESMENT AND PLAN    She has history of ESKD 2/2 IgA nephropathy s/p right kidney transplant on 8/3/19 with Dr. Johnson c/b hydronephrosis, now s/p cystourethroscopy with retrograde pyelography, ureteroscopy and ureteral stent placement on 12/6/19. She had another cystoscopy retrograde pyelogram on 8/20/20 that showed mild hydronephrosis and no strictures in the transplant ureter.  Lasix Renogram with the stent showed a somewhat improved T1/2 (9/22/20) but was otherwise similar to pre-stent level.  11/23/20 another stent was placed for hydronephrosis and possible obstruction on Lasix Renogram. Creatinine 1.0 at that time. Stent was subsequently removed.    11/23/20 cystogram showed transplant ureter reflux    Transplant kidney hydronephrosis   6/9/23 percutaneous nephrostomy tube placed at 25 weeks of pregnancy due to edema.      Hyperparathyroidism  4/29/23 Removed 3.5 parathyroid glands      Plan  She is on Keflex now and her OB doctors plan to continue this until delivery.  I will call [facility] about possible percutaneous nephrostomy tube change sooner than 3 months.  Plan for follow-up after baby is delivered.  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 Johnson complicated by hydronephrosis s/p cystourethroscopy with retrograde pyelography, ureteroscopy and ureteral stent placement on 12/06/2019. Imaging did not show any evidence of filling defects or strictures.    She had  another cystoscopy retrograde pyelogram on 8/20/20 that showed mild hydronephrosis and no strictures in the transplant ureter.      Eventually another stent was placed 11/23/20.    5/30/21  She denies any pain or recent infections.    7/28/23  32 weeks pregnant.  Percutaneous nephrostomy tube was placed due to swelling and hypertension.  The percutaneous nephrostomy tube helped with this.  She was admitted 7/3/23 for urinary tract infection.    This is a select list of notes I reviewed during this visit.  There may be additional notes I reviewed which are not listed:  7/3/23 discharge summary    7/5/23 renal ultrasound transplant kidney  I reviewed the radiologic images and report from this radiologic exam.  My independent interpretation is:  Improved hydronephrosis.        I reviewed the following laboratory data and went over findings with patient:  Recent Labs   Lab Test 04/06/21  0920 03/03/21  0912 02/02/21  0920 12/28/20  0910   WBC 6.7 7.2 6.3 7.2   HGB 12.3 12.6 12.7 12.5    232 237 245     Recent Labs   Lab Test 04/06/21  0920 03/31/21  0900 03/23/21  0920 03/17/21  0926   CR 1.09* 1.00 0.99 0.98   GFRESTIMATED 69 76 77 78   GFRESTBLACK 80 89 89 >90   GLC 91 96 91 92                   Again, thank you for allowing me to participate in the care of your patient.      Sincerely,    Wally Britt MD

## 2023-07-28 NOTE — LETTER
"7/28/2023       RE: Mona Wagner  [address]  [facility] [address]     Dear Colleague,    Thank you for referring your patient, Mona Wagner, to the [facility] [place] at [facility]. Please see a copy of my visit note below.    Video-Visit Details    Type of service:  Video Visit    Virtual visit conducted by Naya.      Originating Location (pt. Location): HOME    Distant Location (provider location):  Off site    Mode of Communication:  Video Conference via [employer]    Physician has received verbal consent for a Video Visit from the patient? YES      Katrin Lopez MD     Start time 1258, stop time 108, chart review, documentation time, coordination of care, 20 minutes, total time spent day of the encounter 30 minutes    Video-Visit Details  Katrin Lopez MD  7/28/23    HPI  #1 history of hypothyroidism  Report, she had a history of hypothyroidism that was diagnosed when she had \"no from her nipples\" with associated high prolactin and presumably, high TSH.  She was then placed on levothyroxine and her nipple discharge and prolactin subsequently improved. Prior to pregnancy, the patient had been on levothyroxine 25 mcg daily 4 days a week, and 37.5 mcg daily 3 days/week. June 2023 TSH 2.51.    Interval history:   Patient has been titrating up her levothyroxine in the setting of pregnancy.  July 2023 TSH 1.32.  Currently on levothyroxine 50 mcg daily.     #2 current pregnancy-32 weeks  Reviewing her chart, this has been planned for many months, with the multidisciplinary team involved.  She reports oral contraceptive use from roughly [id number].  Her oral contraceptive use was stopped in 2022 and she reports regular menses.  She is being followed by nephrology, and maternal-fetal medicine.      Interval history: Patient currently at 32 weeks.  She anticipates vaginal delivery at 37 weeks.  Working closely with [facility].      #3 " history of prediabetes-February 2023 hemoglobin A1c at 5.2  The patient reports history of prediabetes.  Reviewing the patient's chart, August 2019 hemoglobin A1c was 4.8, February 2020 hemoglobin A1c 5.7, in April 2020 hemoglobin A1c was 5.5. February 2023 hemoglobin A1c of 5.2.    Interval history: July 2023 oral glucose tolerance test unremarkable, with 3-hour post glucose (100 g load) at 117.    #4 history of renal transplant in August 2019, now with likely tertiary hyperparathyroidism  The patient has a history of end-stage renal disease, due to suspected IgA nephropathy for which she was previously on dialysis and underwent recent on transplant in August 2019.    Interval history: Currently managed by nephrology.  Has hydronephrosis and currently has a stent in place (as of June 2023) which was complicated by pyelonephritis in July 2023.     #5 history of high prolactin  Per chart, there is a history of prolactin at 43 in April 2016, 38 in February 2017, 38 in March 2018, 51 in June 2019, 9 in November 2019, 14 and April 2020, 14 in June 2021, 20.9 November 2021, and 17 in July 2022.  2017 had MRI did not clearly show a macroadenoma, although microadenoma cannot be excluded.  No contrast was used.    Interval history: This has normalized with treatment of TFTs.    #6 history of hypercalcemia, now with likely tertiary hyperparathyroidism, status post surgery removal of 3.5 parathyroid glands in April 2023.   This is thought to due to renal related secondary hyperparathyroidism.  Had been on Sensipar, however this was stopped as she was trying to become pregnant.  Nephrology felt no need to intervene on serum calcium greater than 11.  January 2023 PTH is 79, serum calcium was 10.8. Potentially, the consideration was for Cinacalcet use with the hypercalcemia (calcium as high as 11.2).  However the patient is concerned about the effect on pregnancy.  As she is in the second trimester, she has met with ENT .The  patient is now status post removal of 3.5 parathyroid glands in April 2023.     Interval history: Subsequent calcium levels since parathyroid surgery has remained normal.    #7 itching  The patient reports intense itching of her hands and feet.    Past Medical History  Past Medical History:   Diagnosis Date    Anemia in chronic kidney disease     Bacteremia 7/5/2023    History of bacterial endocarditis     History of blood transfusion     History of DVT (deep vein thrombosis) 2014    History of seizure 2014    History of subarachnoid hemorrhage     Hydronephrosis     Hypothyroidism     Kidney transplanted 08/03/2019    DCD DDKT. Induction with thymo 6mg/kg.    Orthostatic hypotension     FATOUMATA (obstructive sleep apnea)     Pyelonephritis of transplanted kidney 01/23/2020    Secondary renal hyperparathyroidism (H)     Urinary tract infection        Allergies  Allergies   Allergen Reactions    Contrast Dye Rash     CT contrast allergy 12/14/19 rash over eyes. Need to have pre medication before a CT WITH CONTRAST     Diatrizoate Rash     CT contrast allergy 12/14/19 rash over eyes. Need to have pre medication before a CT WITH CONTRAST     Lisinopril Swelling     angioedema    Nitrous Oxide Other (See Comments)     Sense of doom    Chlorhexidine Rash     Rash at site    Sulfa Antibiotics Rash     Muscle stiffness of neck    Dapsone      Methemoglobinemia    Furosemide Other (See Comments)     Skin flushing    Metrogel [Metronidazole]      Hives diffusely on body after vaginal administration.    Azithromycin Dizziness and Rash    Cefuroxime Rash     Medications  Current Outpatient Medications   Medication Sig Dispense Refill    acetaminophen (TYLENOL) 325 MG tablet Take 2 tablets (650 mg) by mouth every 4 hours as needed for mild pain 100 tablet 3    aspirin (ASA) 81 MG chewable tablet Take 1 tablet (81 mg) by mouth daily 90 tablet 3    azaTHIOprine (IMURAN) 50 MG tablet Take 3 tablets (150 mg) by mouth daily 270 tablet 3     cephALEXin (KEFLEX) 500 MG capsule Take 1 capsule (500 mg) by mouth daily 90 capsule 1    cyanocobalamin (VITAMIN B-12) 1000 MCG tablet Take 1 tablet (1,000 mcg) by mouth daily 90 tablet 1    famotidine (PEPCID) 20 MG tablet Take 1 tablet (20 mg) by mouth daily as needed (dyspepsia) 180 tablet 3    hydrOXYzine (ATARAX) 10 MG tablet Take 1 tablet (10 mg) by mouth 3 times daily as needed for itching 30 tablet 1    levothyroxine (SYNTHROID/LEVOTHROID) 50 MCG tablet Take 1 tablet (50 mcg) by mouth daily 90 tablet 1    magnesium oxide (MAG-OX) 400 MG tablet Take 2 tablets (800 mg) by mouth daily 90 tablet 1    patiromer (VELTASSA) 8.4 g packet Take 8.4 g by mouth daily as needed (as directed by your transplant team, for potassium = or > 5.5) 5 packet 1    polyethylene glycol (MIRALAX) 17 GM/Dose powder Take 17 g (1 capful) by mouth daily as needed for constipation 507 g 3    Prenatal Vit-Fe Fumarate-FA (PRENATAL MULTIVITAMIN W/IRON) 27-0.8 MG tablet Take 1 tablet by mouth every evening 90 tablet 3    simethicone (MYLICON) 125 MG chewable tablet Take 1 tablet (125 mg) by mouth 4 times daily as needed for intestinal gas 120 tablet 3    sodium bicarbonate 650 MG tablet Take 2 tablets (1,300 mg) by mouth 3 times daily for 90 days 540 tablet 3    tacrolimus (GENERIC EQUIVALENT) 0.5 MG capsule Take 1 capsule (0.5 mg) by mouth 2 times daily Total dose = 5.5mg twice daily. 60 capsule 11    tacrolimus (GENERIC EQUIVALENT) 1 MG capsule Take 2 capsules (2 mg) by mouth 2 times daily HOLD for dose change. (Patient not taking: Reported on 7/19/2023) 360 capsule 3    tacrolimus (GENERIC EQUIVALENT) 5 MG capsule Take 1 capsule (5 mg) by mouth 2 times daily Total dose = 5.5mg twice daily 60 capsule 11    vitamin D3 (CHOLECALCIFEROL) 50 mcg (2000 units) tablet Take 1 tablet (50 mcg) by mouth daily for 90 days 90 tablet 3     Family History  family history includes Cerebrovascular Disease in her father and sister; Coronary Artery  "Disease in her father; Diabetes in her sister; Heart Disease in her father; Hypertension in her sister; Kidney Disease in her mother and sister; Kidney failure in her mother; Sleep Apnea in her father.  Social History  Social History     Socioeconomic History    Marital status:      Spouse name: Jt Aleman (culturally )    Number of children: 0    Years of education: Not on file    Highest education level: Not on file   Occupational History    Occupation: Pharmacy Technician   Tobacco Use    Smoking status: Never    Smokeless tobacco: Never   Vaping Use    Vaping Use: Never used   Substance and Sexual Activity    Alcohol use: No     Alcohol/week: 0.0 standard drinks of alcohol    Drug use: No    Sexual activity: Yes     Partners: Male   Other Topics Concern    Parent/sibling w/ CABG, MI or angioplasty before 65F 55M? Not Asked   Social History Narrative    Date of Service:5/15/2013     Name: Mona VALDOVINOS (Month, Day, Year of birth): 1990     MRN: [id number]     New Patient: Yes    Preferred Language: English     Needed: No    County of Residence: [place]    Marital Status:     Household size: 8    Number of Dependents:0     Pregnant: 0    Average Monthly Income: $ 600    Citizenship Status: Citizen    Gave Pt [employer] and [employer] applications     Social Determinants of Health     Financial Resource Strain: Not on file   Food Insecurity: Not on file   Transportation Needs: Not on file   Physical Activity: Not on file   Stress: Not on file   Social Connections: Not on file   Intimate Partner Violence: Not on file   Housing Stability: Not on file       ROS  Per HPI    Physical Exam  GENERAL: Healthy, alert and no distress\",\"EYES: Eyes grossly normal to inspection.  No discharge or erythema, or obvious scleral/conjunctival abnormalities.\",\"RESP: No audible wheeze, cough, or visible cyanosis.  No visible retractions or increased work of breathing.  \",\"SKIN: Visible skin clear. No " "significant rash, abnormal pigmentation or lesions.\",\"NEURO: Cranial nerves grossly intact.  Mentation and speech appropriate for age.\",\"PSYCH: Mentation appears normal, affect normal/bright, judgement and insight intact, normal speech and appearance well-groomed.    RESULTS     No results displayed because visit has over 200 results.      Admission on 06/08/2023, Discharged on 06/10/2023   Component Date Value Ref Range Status    AST 06/08/2023 11  10 - 35 U/L Final    ALT 06/08/2023 9 (L)  10 - 35 U/L Final    Culture 06/08/2023 10,000-50,000 CFU/mL Mixture of urogenital anita   Final    ABO/RH(D) 06/08/2023 O POS   Final    Antibody Screen 06/08/2023 Negative  Negative Final    SPECIMEN EXPIRATION DATE 06/08/2023 [id number]   Final    N terminal Pro BNP Inpatient 06/08/2023 416  0 - 450 pg/mL Final    Reference range shown and results flagged as abnormal are suggested inpatient cut points for confirming diagnosis if CHF in an acute setting. Establishing a baseline value for each individual patient is useful for follow-up. An inpatient or emergency department NT-proPBNP <300 pg/mL effectively rules out acute CHF, with 99% negative predictive value.    The outpatient non-acute reference range for ruling out CHF is:  0-125 pg/mL (age 18 to less than 75)  0-450 pg/mL (age 75 yrs and older)     Hold Specimen 06/08/2023 JIC   Final    Sodium 06/09/2023 136  136 - 145 mmol/L Final    Potassium 06/09/2023 4.5  3.4 - 5.3 mmol/L Final    Chloride 06/09/2023 105  98 - 107 mmol/L Final    Carbon Dioxide (CO2) 06/09/2023 20 (L)  22 - 29 mmol/L Final    Anion Gap 06/09/2023 11  7 - 15 mmol/L Final    Urea Nitrogen 06/09/2023 23.9 (H)  6.0 - 20.0 mg/dL Final    Creatinine 06/09/2023 1.43 (H)  0.51 - 0.95 mg/dL Final    Calcium 06/09/2023 8.9  8.6 - 10.0 mg/dL Final    Glucose 06/09/2023 95  70 - 99 mg/dL Final    Alkaline Phosphatase 06/09/2023 69  35 - 104 U/L Final    AST 06/09/2023 9 (L)  10 - 35 U/L Final    ALT " 06/09/2023 8 (L)  10 - 35 U/L Final    Protein Total 06/09/2023 6.4  6.4 - 8.3 g/dL Final    Albumin 06/09/2023 3.5  3.5 - 5.2 g/dL Final    Bilirubin Total 06/09/2023 0.4  <=1.2 mg/dL Final    GFR Estimate 06/09/2023 50 (L)  >60 mL/min/1.73m2 Final    eGFR calculated using 2021 CKD-EPI equation.    WBC Count 06/09/2023 7.6  4.0 - 11.0 10e3/uL Final    RBC Count 06/09/2023 2.58 (L)  3.80 - 5.20 10e6/uL Final    Hemoglobin 06/09/2023 7.9 (L)  11.7 - 15.7 g/dL Final    Hematocrit 06/09/2023 24.3 (L)  35.0 - 47.0 % Final    MCV 06/09/2023 94  78 - 100 fL Final    MCH 06/09/2023 30.6  26.5 - 33.0 pg Final    MCHC 06/09/2023 32.5  31.5 - 36.5 g/dL Final    RDW 06/09/2023 13.3  10.0 - 15.0 % Final    Platelet Count 06/09/2023 168  150 - 450 10e3/uL Final    INR 06/09/2023 0.99  0.85 - 1.15 Final    Sodium 06/10/2023 135 (L)  136 - 145 mmol/L Final    Potassium 06/10/2023 4.6  3.4 - 5.3 mmol/L Final    Chloride 06/10/2023 102  98 - 107 mmol/L Final    Carbon Dioxide (CO2) 06/10/2023 22  22 - 29 mmol/L Final    Anion Gap 06/10/2023 11  7 - 15 mmol/L Final    Urea Nitrogen 06/10/2023 21.9 (H)  6.0 - 20.0 mg/dL Final    Creatinine 06/10/2023 1.38 (H)  0.51 - 0.95 mg/dL Final    Calcium 06/10/2023 9.1  8.6 - 10.0 mg/dL Final    Glucose 06/10/2023 97  70 - 99 mg/dL Final    GFR Estimate 06/10/2023 53 (L)  >60 mL/min/1.73m2 Final    eGFR calculated using 2021 CKD-EPI equation.     Case Report   Surgical Pathology Report                         Case: [id number]                                   Authorizing Provider:  Melissa Jones MD   Collected:           04/28/2023 09:16 AM           Ordering Location:     [facility] [place]                 Received:            04/28/2023 09:28 AM           Pathologist:           Aisha Walters MD                                                                            Intraop:               Emerald Aquino  MD Rosanna                                                    Specimens:   A) - Parathyroid, Superior, Right, Right superior parathyroid                                        B) - Parathyroid, Inferior, Right                                                                    D) - Parathyroid, Superior, Left, Left, superior parathyroid                                         E) - Parathyroid, Inferior, Left, parathyroid, Left Inferior                               Final Diagnosis   A. PARATHYROID, RIGHT SUPERIOR, EXCISION:  - Hypercellular parathyroid tissue, 300 mg     B.  PARATHYROID, RIGHT INFERIOR, EXCISION:  - Hypercellular parathyroid tissue, 100 mg     D.  PARATHYROID, LEFT SUPERIOR , EXCISION:  - Hypercellular parathyroid tissue, 200 mg     E.  PARATHYROID, LEFT INFERIOR  EXCISION:  - Hypercellular parathyroid tissue, 200 mg   Electronically signed by Aisha Walters MD on 5/1/2023 at  4:10 PM         Comments:   This is an appended report. These results have been appended to a previously preliminary verified report.      Comment  [facility]   Features are consistent with parathyroid hyperplasia.   Comment: This is an appended report. These results have been appended to a previously preliminary verified        Component      Latest Ref Rng 6/16/2023  7:23 AM   TSH      0.30 - 4.20 uIU/mL 2.51        ASSESSMENT:     #1 hypothyroidism in the setting of pregnancy  We will add TFTs to recent lab draw.  Patient currently on levothyroxine 50 mcg daily.  Patient to recheck labs monthly    #2 current pregnancy  Congratulated patient.       #3 history of prediabetes  No evidence for gestational diabetes at this time.    #4 history of renal transplant in August 2019  Per nephrology.  Noted stent as well as history of pyelonephritis.     #5 history of high prolactin  This has resolved with treatment of her hypothyroidism    #6 history of hypercalcemia, now with likely tertiary hyperparathyroidism, status post  surgery removal of 3.5 parathyroid glands in April 2023.   Her hypercalcemia has resolved.  She does have some itching as noted below.  Serum calciums have remained normal.  She could consider taking her liquid calcium 2.5 mils per day (translates to roughly 625 g daily).    #7 itching  Recommended patient try Vanicream lite for her hands, take calcium 5 ml daily, and if needed, add Sarna cream or topical hydrocortisone (2.5%) as needed.  Prescription for hydrocortisone sent to pharmacy.  I will also add bilirubin to recent lab draw.    Return in 3 months.      Again, thank you for allowing me to participate in the care of your patient.      Sincerely,    Katrin Lopez MD

## 2023-07-28 NOTE — NURSING NOTE
Chief Complaint   Patient presents with    Follow Up     Discuss nephrostomy tube       Blood pressure 131/87, pulse 76, height 1.524 m (5'), weight 79.4 kg (175 lb), last menstrual period 2022, not currently breastfeeding. Body mass index is 34.18 kg/m .    Patient Active Problem List   Diagnosis    DVT of upper extremity (deep vein thrombosis) (H)    Seizure disorder (H)    Acquired hypothyroidism    Aftercare following organ transplant    Immunosuppressed status (H)    Kidney replaced by transplant    Anxiety    Vitamin D deficiency    CKD (chronic kidney disease) stage 3, GFR 30-59 ml/min (H)    Bicornate uterus    Ureteral stenosis    Orthostatic hypotension    High-risk pregnancy in third trimester    Stage 3a chronic kidney disease (H)    Anemia of chronic renal failure, stage 3a (H)    Elevated blood pressure affecting pregnancy in second trimester, antepartum     contractions    Encounter for triage in pregnant patient       Allergies   Allergen Reactions    Contrast Dye Rash     CT contrast allergy 19 rash over eyes. Need to have pre medication before a CT WITH CONTRAST     Diatrizoate Rash     CT contrast allergy 19 rash over eyes. Need to have pre medication before a CT WITH CONTRAST     Lisinopril Swelling     angioedema    Nitrous Oxide Other (See Comments)     Sense of doom    Chlorhexidine Rash     Rash at site    Sulfa Antibiotics Rash     Muscle stiffness of neck    Dapsone      Methemoglobinemia    Furosemide Other (See Comments)     Skin flushing    Metrogel [Metronidazole]      Hives diffusely on body after vaginal administration.    Azithromycin Dizziness and Rash    Cefuroxime Rash       Current Outpatient Medications   Medication Sig Dispense Refill    acetaminophen (TYLENOL) 325 MG tablet Take 2 tablets (650 mg) by mouth every 4 hours as needed for mild pain 100 tablet 3    aspirin (ASA) 81 MG chewable tablet Take 1 tablet (81 mg) by mouth daily 90 tablet 3     azaTHIOprine (IMURAN) 50 MG tablet Take 3 tablets (150 mg) by mouth daily 270 tablet 3    cephALEXin (KEFLEX) 500 MG capsule Take 1 capsule (500 mg) by mouth daily 90 capsule 1    cyanocobalamin (VITAMIN B-12) 1000 MCG tablet Take 1 tablet (1,000 mcg) by mouth daily 90 tablet 1    famotidine (PEPCID) 20 MG tablet Take 1 tablet (20 mg) by mouth daily as needed (dyspepsia) 180 tablet 3    hydrOXYzine (ATARAX) 10 MG tablet Take 1 tablet (10 mg) by mouth 3 times daily as needed for itching 30 tablet 1    levothyroxine (SYNTHROID/LEVOTHROID) 50 MCG tablet Take 1 tablet (50 mcg) by mouth daily 90 tablet 1    magnesium oxide (MAG-OX) 400 MG tablet Take 2 tablets (800 mg) by mouth daily 90 tablet 1    patiromer (VELTASSA) 8.4 g packet Take 8.4 g by mouth daily as needed (as directed by your transplant team, for potassium = or > 5.5) 5 packet 1    polyethylene glycol (MIRALAX) 17 GM/Dose powder Take 17 g (1 capful) by mouth daily as needed for constipation 507 g 3    Prenatal Vit-Fe Fumarate-FA (PRENATAL MULTIVITAMIN W/IRON) 27-0.8 MG tablet Take 1 tablet by mouth every evening 90 tablet 3    simethicone (MYLICON) 125 MG chewable tablet Take 1 tablet (125 mg) by mouth 4 times daily as needed for intestinal gas 120 tablet 3    sodium bicarbonate 650 MG tablet Take 2 tablets (1,300 mg) by mouth 3 times daily for 90 days 540 tablet 3    tacrolimus (GENERIC EQUIVALENT) 0.5 MG capsule Take 1 capsule (0.5 mg) by mouth 2 times daily Total dose = 5.5mg twice daily. 60 capsule 11    tacrolimus (GENERIC EQUIVALENT) 1 MG capsule Take 2 capsules (2 mg) by mouth 2 times daily HOLD for dose change. (Patient not taking: Reported on 7/19/2023) 360 capsule 3    tacrolimus (GENERIC EQUIVALENT) 5 MG capsule Take 1 capsule (5 mg) by mouth 2 times daily Total dose = 5.5mg twice daily 60 capsule 11    vitamin D3 (CHOLECALCIFEROL) 50 mcg (2000 units) tablet Take 1 tablet (50 mcg) by mouth daily for 90 days 90 tablet 3       Social History      Tobacco Use    Smoking status: Never    Smokeless tobacco: Never   Vaping Use    Vaping Use: Never used   Substance Use Topics    Alcohol use: No     Alcohol/week: 0.0 standard drinks of alcohol    Drug use: Alida Calvillo  7/28/2023  9:36 AM

## 2023-07-28 NOTE — PROGRESS NOTES
Urology Clinic  MARCUS Britt MD  Jul 28, 2023  32 year old female    ASSESMENT AND PLAN    She has history of ESKD 2/2 IgA nephropathy s/p right kidney transplant on 8/3/19 with Dr. Johnson c/b hydronephrosis, now s/p cystourethroscopy with retrograde pyelography, ureteroscopy and ureteral stent placement on 12/6/19. She had another cystoscopy retrograde pyelogram on 8/20/20 that showed mild hydronephrosis and no strictures in the transplant ureter.  Lasix Renogram with the stent showed a somewhat improved T1/2 (9/22/20) but was otherwise similar to pre-stent level.  11/23/20 another stent was placed for hydronephrosis and possible obstruction on Lasix Renogram. Creatinine 1.0 at that time. Stent was subsequently removed.    11/23/20 cystogram showed transplant ureter reflux    Transplant kidney hydronephrosis   6/9/23 percutaneous nephrostomy tube placed at 25 weeks of pregnancy due to edema.      Hyperparathyroidism  4/29/23 Removed 3.5 parathyroid glands      Plan  She is on Keflex now and her OB doctors plan to continue this until delivery.  I will call INTERVENTIONAL RADIOLOGY about possible percutaneous nephrostomy tube change sooner than 3 months.  Plan for follow-up after baby is delivered.  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 Johnson complicated by hydronephrosis s/p cystourethroscopy with retrograde pyelography, ureteroscopy and ureteral stent placement on 12/06/2019. Imaging did not show any evidence of filling defects or strictures.    She had another cystoscopy retrograde pyelogram on 8/20/20 that showed mild hydronephrosis and no strictures in the transplant ureter.      Eventually another stent was placed 11/23/20.    5/30/21  She denies any pain or recent infections.    7/28/23  32 weeks pregnant.  Percutaneous nephrostomy tube was placed due to  swelling and hypertension.  The percutaneous nephrostomy tube helped with this.  She was admitted 7/3/23 for urinary tract infection.    This is a select list of notes I reviewed during this visit.  There may be additional notes I reviewed which are not listed:  7/3/23 discharge summary    7/5/23 renal ultrasound transplant kidney  I reviewed the radiologic images and report from this radiologic exam.  My independent interpretation is:  Improved hydronephrosis.        I reviewed the following laboratory data and went over findings with patient:  Recent Labs   Lab Test 04/06/21  0920 03/03/21  0912 02/02/21  0920 12/28/20  0910   WBC 6.7 7.2 6.3 7.2   HGB 12.3 12.6 12.7 12.5    232 237 245     Recent Labs   Lab Test 04/06/21  0920 03/31/21  0900 03/23/21  0920 03/17/21  0926   CR 1.09* 1.00 0.99 0.98   GFRESTIMATED 69 76 77 78   GFRESTBLACK 80 89 89 >90   GLC 91 96 91 92

## 2023-07-31 ENCOUNTER — TELEPHONE (OUTPATIENT)
Dept: TRANSPLANT | Facility: CLINIC | Age: 33
End: 2023-07-31

## 2023-07-31 ENCOUNTER — TELEPHONE (OUTPATIENT)
Dept: MATERNAL FETAL MEDICINE | Facility: CLINIC | Age: 33
End: 2023-07-31
Payer: MEDICARE

## 2023-07-31 ENCOUNTER — LAB (OUTPATIENT)
Dept: LAB | Facility: HOSPITAL | Age: 33
End: 2023-07-31
Payer: MEDICARE

## 2023-07-31 DIAGNOSIS — Z48.298 AFTERCARE FOLLOWING ORGAN TRANSPLANT: ICD-10-CM

## 2023-07-31 DIAGNOSIS — E86.0 DEHYDRATION: ICD-10-CM

## 2023-07-31 DIAGNOSIS — L29.9 ITCHING: ICD-10-CM

## 2023-07-31 DIAGNOSIS — N18.31 ANEMIA OF CHRONIC RENAL FAILURE, STAGE 3A (H): ICD-10-CM

## 2023-07-31 DIAGNOSIS — N18.30 STAGE 3 CHRONIC KIDNEY DISEASE, UNSPECIFIED WHETHER STAGE 3A OR 3B CKD (H): ICD-10-CM

## 2023-07-31 DIAGNOSIS — Z94.0 KIDNEY REPLACED BY TRANSPLANT: ICD-10-CM

## 2023-07-31 DIAGNOSIS — N18.31 STAGE 3A CHRONIC KIDNEY DISEASE (H): ICD-10-CM

## 2023-07-31 DIAGNOSIS — D63.1 ANEMIA OF CHRONIC RENAL FAILURE, STAGE 3A (H): ICD-10-CM

## 2023-07-31 DIAGNOSIS — N18.30 STAGE 3 CHRONIC KIDNEY DISEASE, UNSPECIFIED WHETHER STAGE 3A OR 3B CKD (H): Primary | ICD-10-CM

## 2023-07-31 DIAGNOSIS — E03.9 ACQUIRED HYPOTHYROIDISM: ICD-10-CM

## 2023-07-31 LAB
ALBUMIN MFR UR ELPH: 33.5 MG/DL
ALBUMIN SERPL BCG-MCNC: 3.8 G/DL (ref 3.5–5.2)
ALP SERPL-CCNC: 139 U/L (ref 35–104)
ALT SERPL W P-5'-P-CCNC: 29 U/L (ref 0–50)
ANION GAP SERPL CALCULATED.3IONS-SCNC: 15 MMOL/L (ref 7–15)
AST SERPL W P-5'-P-CCNC: 35 U/L (ref 0–45)
BASOPHILS # BLD AUTO: 0.1 10E3/UL (ref 0–0.2)
BASOPHILS NFR BLD AUTO: 1 %
BILIRUB DIRECT SERPL-MCNC: 0.54 MG/DL (ref 0–0.3)
BILIRUB SERPL-MCNC: 1.4 MG/DL
BUN SERPL-MCNC: 29.6 MG/DL (ref 6–20)
CALCIUM SERPL-MCNC: 9.9 MG/DL (ref 8.6–10)
CHLORIDE SERPL-SCNC: 104 MMOL/L (ref 98–107)
CREAT SERPL-MCNC: 1.33 MG/DL (ref 0.51–0.95)
CREAT UR-MCNC: 45.1 MG/DL
DEPRECATED HCO3 PLAS-SCNC: 17 MMOL/L (ref 22–29)
EOSINOPHIL # BLD AUTO: 0.1 10E3/UL (ref 0–0.7)
EOSINOPHIL NFR BLD AUTO: 1 %
ERYTHROCYTE [DISTWIDTH] IN BLOOD BY AUTOMATED COUNT: 14.6 % (ref 10–15)
FERRITIN SERPL-MCNC: 1525 NG/ML (ref 6–175)
GFR SERPL CREATININE-BSD FRML MDRD: 55 ML/MIN/1.73M2
GLUCOSE SERPL-MCNC: 85 MG/DL (ref 70–99)
HCT VFR BLD AUTO: 32.1 % (ref 35–47)
HGB BLD-MCNC: 10.1 G/DL (ref 11.7–15.7)
IMM GRANULOCYTES # BLD: 0.1 10E3/UL
IMM GRANULOCYTES NFR BLD: 1 %
IRON BINDING CAPACITY (ROCHE): 269 UG/DL (ref 240–430)
IRON SATN MFR SERPL: 35 % (ref 15–46)
IRON SERPL-MCNC: 94 UG/DL (ref 37–145)
LYMPHOCYTES # BLD AUTO: 0.9 10E3/UL (ref 0.8–5.3)
LYMPHOCYTES NFR BLD AUTO: 10 %
MCH RBC QN AUTO: 31 PG (ref 26.5–33)
MCHC RBC AUTO-ENTMCNC: 31.5 G/DL (ref 31.5–36.5)
MCV RBC AUTO: 99 FL (ref 78–100)
MONOCYTES # BLD AUTO: 0.4 10E3/UL (ref 0–1.3)
MONOCYTES NFR BLD AUTO: 5 %
NEUTROPHILS # BLD AUTO: 7.2 10E3/UL (ref 1.6–8.3)
NEUTROPHILS NFR BLD AUTO: 82 %
NRBC # BLD AUTO: 0 10E3/UL
NRBC BLD AUTO-RTO: 0 /100
PLATELET # BLD AUTO: 234 10E3/UL (ref 150–450)
POTASSIUM SERPL-SCNC: 4.9 MMOL/L (ref 3.4–5.3)
PROT SERPL-MCNC: 7.5 G/DL (ref 6.4–8.3)
PROT/CREAT 24H UR: 0.74 MG/MG CR (ref 0–0.2)
RBC # BLD AUTO: 3.26 10E6/UL (ref 3.8–5.2)
SODIUM SERPL-SCNC: 136 MMOL/L (ref 136–145)
T3FREE SERPL-MCNC: 2.6 PG/ML (ref 2–4.4)
T4 FREE SERPL-MCNC: 1.24 NG/DL (ref 0.9–1.7)
TACROLIMUS BLD-MCNC: 6.9 UG/L (ref 5–15)
TME LAST DOSE: NORMAL H
TME LAST DOSE: NORMAL H
TSH SERPL DL<=0.005 MIU/L-ACNC: 1.24 UIU/ML (ref 0.3–4.2)
WBC # BLD AUTO: 8.7 10E3/UL (ref 4–11)

## 2023-07-31 PROCEDURE — 80197 ASSAY OF TACROLIMUS: CPT

## 2023-07-31 PROCEDURE — 82248 BILIRUBIN DIRECT: CPT

## 2023-07-31 PROCEDURE — 84156 ASSAY OF PROTEIN URINE: CPT

## 2023-07-31 PROCEDURE — 85004 AUTOMATED DIFF WBC COUNT: CPT

## 2023-07-31 PROCEDURE — 84443 ASSAY THYROID STIM HORMONE: CPT

## 2023-07-31 PROCEDURE — 36415 COLL VENOUS BLD VENIPUNCTURE: CPT

## 2023-07-31 PROCEDURE — 84439 ASSAY OF FREE THYROXINE: CPT

## 2023-07-31 PROCEDURE — 84481 FREE ASSAY (FT-3): CPT

## 2023-07-31 PROCEDURE — 82239 BILE ACIDS TOTAL: CPT

## 2023-07-31 PROCEDURE — 83550 IRON BINDING TEST: CPT

## 2023-07-31 PROCEDURE — 82728 ASSAY OF FERRITIN: CPT

## 2023-07-31 NOTE — TELEPHONE ENCOUNTER
Mona called in with complaints of overall itching of body including palms of hands and soles of feet.   Writer spoke with Nathalia Lubin and pt to have bile acids and hepatic panel drawn with labs today.   Pt has +FM, denies any s/s of preeclampsia.   Pt will call with any concerns or complaints. Delicia Parker RN

## 2023-08-01 ENCOUNTER — TELEPHONE (OUTPATIENT)
Dept: INTERVENTIONAL RADIOLOGY/VASCULAR | Facility: CLINIC | Age: 33
End: 2023-08-01

## 2023-08-01 ENCOUNTER — TELEPHONE (OUTPATIENT)
Dept: ENDOCRINOLOGY | Facility: CLINIC | Age: 33
End: 2023-08-01

## 2023-08-01 ENCOUNTER — VIRTUAL VISIT (OUTPATIENT)
Dept: SURGERY | Facility: CLINIC | Age: 33
End: 2023-08-01
Payer: MEDICARE

## 2023-08-01 ENCOUNTER — PRE VISIT (OUTPATIENT)
Dept: SURGERY | Facility: CLINIC | Age: 33
End: 2023-08-01

## 2023-08-01 DIAGNOSIS — O09.93 HIGH-RISK PREGNANCY IN THIRD TRIMESTER: Primary | ICD-10-CM

## 2023-08-01 DIAGNOSIS — Z01.818 PREOP EXAMINATION: ICD-10-CM

## 2023-08-01 LAB — BILE AC SERPL-SCNC: 10 UMOL/L

## 2023-08-01 PROCEDURE — 99215 OFFICE O/P EST HI 40 MIN: CPT | Mod: 95 | Performed by: NURSE PRACTITIONER

## 2023-08-01 RX ORDER — CHOLECALCIFEROL (VITAMIN D3) 125 MCG
3000 CAPSULE ORAL
COMMUNITY

## 2023-08-01 ASSESSMENT — ENCOUNTER SYMPTOMS: SEIZURES: 1

## 2023-08-01 ASSESSMENT — PAIN SCALES - GENERAL: PAINLEVEL: NO PAIN (0)

## 2023-08-01 NOTE — TELEPHONE ENCOUNTER
I was asked by  to contact a patient who had questions regarding nephrostomy tube exchange while pregnant.    Patient is approximately 33 weeks pregnant and had a right nephrostomy tube placed on 6/9/2023 d/t hydronephrosis of transplanted kidney.    Patient questioning if her Urologist contacted IR to confirm that PNT would be exchanged sooner than the typically recommended 12 week interval.  After chart review and discussion with IR team member,  I was able to confirm that there was a conversation between urology and IR regarding timing of next PNT.      Exchange currently scheduled for 9/1/2023.      Patient is now concerned that this date (9/1) is her 37 week date, which is when her [facility] team stated was the earliest she could deliver.      Mona is due to deliver 9/22/2023.  She is wondering if this exchange is too far out.      Mona also has concerns regarding the use of fluoroscopy during her pregnancy.  I advised Mona to contact her [facility] team with these concerns and obtain their recommendations and insight.  If this date is deemed not time appropriate by [facility] team, Patient has been given IR contact number for rescheduling.      Mona and I also discussed her plans for PNT exchange and I have informed her of plan for [facility] IR arrival, and that I will be sending her a [employer] message with details of this procedure.    All questions answered at this time.    Nathalia REEVES  Interventional Radiology RN   [phone]

## 2023-08-01 NOTE — TELEPHONE ENCOUNTER
Tacrolimus level 6.9 at 12 hours, on 7/31/2023.  Goal 4-6.   Current dose 5.5 mg in AM, 5.5 mg in PM    Dose changed to 5.5 mg in AM, 5 mg in PM   Zachary level in one week    Co2 also low,  asked if patient is experiencing diarrhea and if she is taking her prescribed sodium bicarbonate,     Unable to leave message.  See my chart.    Jennifer Trejo RN   Transplant Coordinator  [phone]

## 2023-08-01 NOTE — TELEPHONE ENCOUNTER
TFTs noted below - pt to recheck in 1 month - continue with levothyroxine at 50 mcg daily    -  Dear Mona    Here are your labs which show a NORMAL TSH    If you have any questions, please feel free to contact my nurse at [phone] select option #3 for triage nurse  or  option #1 for scheduling related questions.    Regards    Katrin Lopez MD     Lab on 07/31/2023   Component Date Value Ref Range Status     TSH 07/31/2023 1.24  0.30 - 4.20 uIU/mL Final     T3 Free 07/31/2023 2.6  2.0 - 4.4 pg/mL Final     Free T4 07/31/2023 1.24  0.90 - 1.70 ng/dL Final

## 2023-08-01 NOTE — PROGRESS NOTES
Mona is a 32 year old who is being evaluated via a billable video visit.      How would you like to obtain your AVS? MyChart  If the video visit is dropped, the invitation should be resent by: Text to cell phone: [phone]          HPI             Review of Systems               Physical Exam

## 2023-08-01 NOTE — CONSULTS
Anesthesia Consult Note      Reason for consult:   High Risk OB consult  No diagnosis found.      Date of Encounter: 2023  Referring Physician: OB provider  Primary Care Physician:  Macarena Bowen  Mona Wagner is a 32 year old woman who is currently  who is due on 23. She is being seen in our clinic for high risk OB consult due to s/p renal transplant and multiple other medical conditions (see below). The patient has an OB history significant for: none    OB Hx:       /parity:      History of complications of pregnancy  No previous pregnancy complications or first pregnancy    History of obstetrical surgery  No previous  or uterine surgery    History of bleeding/coagulopathy  History of DVT    History of anesthesia issues in patient or 1st degree relative  History of difficult intubation - see anesthesia report from 23  Other:  please see note in PAC template/ROS    History is obtained from the patient and chart review..     Past Medical History  Past Medical History:   Diagnosis Date    Anemia in chronic kidney disease     Bacteremia 2023    History of bacterial endocarditis     History of blood transfusion     History of DVT (deep vein thrombosis)     History of seizure     History of subarachnoid hemorrhage     Hydronephrosis     Hypothyroidism     Kidney transplanted 2019    DCD DDKT. Induction with thymo 6mg/kg.    Orthostatic hypotension     FATOUMATA (obstructive sleep apnea)     Pyelonephritis of transplanted kidney 2020    Secondary renal hyperparathyroidism (H)     Urinary tract infection        Past Surgical History  Past Surgical History:   Procedure Laterality Date    BENCH KIDNEY N/A 8/3/2019    Procedure: BACKBENCH PREPARATION, ALLOGRAFT, KIDNEY;  Surgeon: Dorian Johnson MD;  Location: [facility] OR    COMBINED CYSTOSCOPY, RETROGRADES, EXCHANGE STENT URETER(S) Right 2020    Procedure: CYSTOSCOPY, CYSOTGRAM, WITH RETROGRADE  PYELOGRAM, ureteroscopy,  AND URETERAL STENT;  Surgeon: Wally Britt MD;  Location: [facility] OR    COMBINED CYSTOSCOPY, RETROGRADES, URETEROSCOPY, LASER HOLMIUM LITHOTRIPSY URETER(S), INSERT STENT N/A 2019    Procedure: CYSTOURETEROSCOPY, WITH RETROGRADE PYELOGRAM of transplant kidney, STENT INSERTION, Laser on standby;  Surgeon: Wally Britt MD;  Location: [facility] OR    CREATE FISTULA ARTERIOVENOUS UPPER EXTREMITY  2014    Procedure: CREATE FISTULA ARTERIOVENOUS UPPER EXTREMITY;  Left Wrist Arteriovenous Fistula Placement;  Surgeon: Shashi Castro MD;  Location: [facility] OR    CREATE FISTULA ARTERIOVENOUS UPPER EXTREMITY      CYSTOSCOPY, RETROGRADES, INSERT STENT URETER(S), COMBINED N/A 2020    Procedure: CYSTOSCOPY, WITH RETROGRADE PYELOGRAM, CYSTOGRAM AND URETERAL STENT INSERTION - TRANSPLANT KIDNEY;  Surgeon: Wally Britt MD;  Location: [facility] OR    IR CEREBRAL ANGIOGRAM  2014    IR NEPHROSTOMY TUBE PLACEMENT RIGHT  2023    PARATHYROIDECTOMY Bilateral 2023    Procedure: Bilateral Resection of 3 and 1/2 parathyroid glands;  Surgeon: Melissa Jones MD;  Location: [facility] OR    PERCUTANEOUS BIOPSY KIDNEY Right 2019    Procedure: Right Kidney Biopsy;  Surgeon: Deny Rios MD;  Location: [facility]    RASTA/DIALYSIS CATHETER  12/10/2013         TRANSPLANT KIDNEY RECIPIENT  DONOR N/A 8/3/2019    Procedure: TRANSPLANT, KIDNEY, RECIPIENT,  DONOR with Ureteral Stent Placement;  Surgeon: Dorian Johnson MD;  Location: [facility] OR       Prior to Admission Medications  Current Outpatient Medications   Medication Sig Dispense Refill    acetaminophen (TYLENOL) 325 MG tablet Take 2 tablets (650 mg) by mouth every 4 hours as needed for mild pain 100 tablet 3    aspirin (ASA) 81 MG chewable tablet Take 1 tablet (81 mg) by mouth daily 90 tablet 3    azaTHIOprine (IMURAN) 50 MG tablet Take 3 tablets (150 mg) by mouth daily (Patient taking differently: Take 150 mg by  mouth every evening) 270 tablet 3    calcium carbonate 1250 MG/5ML SUSP suspension Take 2.5 mLs (625 mg) by mouth daily (Patient taking differently: Take 625 mg by mouth as needed for heartburn) 500 mL 1    cephALEXin (KEFLEX) 500 MG capsule Take 1 capsule (500 mg) by mouth daily (Patient taking differently: Take 500 mg by mouth every evening) 90 capsule 1    cyanocobalamin (VITAMIN B-12) 1000 MCG tablet Take 1 tablet (1,000 mcg) by mouth daily (Patient taking differently: Take 1,000 mcg by mouth every morning) 90 tablet 1    famotidine (PEPCID) 20 MG tablet Take 1 tablet (20 mg) by mouth daily as needed (dyspepsia) 180 tablet 3    hydrocortisone 2.5 % cream Apply topically 2 times daily (Patient taking differently: Apply topically as needed) 30 g 3    hydrOXYzine (ATARAX) 10 MG tablet Take 1 tablet (10 mg) by mouth 3 times daily as needed for itching (Patient taking differently: Take 10 mg by mouth as needed for itching) 30 tablet 1    lactase (LACTAID) 3000 UNIT tablet Take 3,000 Units by mouth 3 times daily (with meals)      levothyroxine (SYNTHROID/LEVOTHROID) 50 MCG tablet Take 1 tablet (50 mcg) by mouth daily (Patient taking differently: Take 50 mcg by mouth every morning) 90 tablet 1    magnesium oxide (MAG-OX) 400 MG tablet Take 800 mg by mouth every morning 90 tablet 1    patiromer (VELTASSA) 8.4 g packet Take 8.4 g by mouth daily as needed (as directed by your transplant team, for potassium = or > 5.5) 5 packet 1    polyethylene glycol (MIRALAX) 17 GM/Dose powder Take 17 g (1 capful) by mouth daily as needed for constipation 507 g 3    Prenatal Vit-Fe Fumarate-FA (PRENATAL MULTIVITAMIN W/IRON) 27-0.8 MG tablet Take 1 tablet by mouth every evening 90 tablet 3    simethicone (MYLICON) 125 MG chewable tablet Take 1 tablet (125 mg) by mouth 4 times daily as needed for intestinal gas (Patient taking differently: Take 125 mg by mouth as needed for intestinal gas) 120 tablet 3    sodium bicarbonate 650 MG tablet  Take 2 tablets (1,300 mg) by mouth 3 times daily for 90 days 540 tablet 3    tacrolimus (GENERIC EQUIVALENT) 0.5 MG capsule Take 1 capsule (0.5 mg) by mouth 2 times daily Total dose = 5.5mg twice daily. 60 capsule 11    tacrolimus (GENERIC EQUIVALENT) 5 MG capsule Take 1 capsule (5 mg) by mouth 2 times daily Total dose = 5.5mg twice daily 60 capsule 11    vitamin D3 (CHOLECALCIFEROL) 50 mcg (2000 units) tablet Take 1 tablet (50 mcg) by mouth daily for 90 days (Patient taking differently: Take 1 tablet by mouth every morning) 90 tablet 3    tacrolimus (GENERIC EQUIVALENT) 1 MG capsule Take 2 capsules (2 mg) by mouth 2 times daily HOLD for dose change. 360 capsule 3       Menstrual history: Patient's last menstrual period was 12/16/2022.:      Allergies  Allergies   Allergen Reactions    Contrast Dye Rash     CT contrast allergy 12/14/19 rash over eyes. Need to have pre medication before a CT WITH CONTRAST     Diatrizoate Rash     CT contrast allergy 12/14/19 rash over eyes. Need to have pre medication before a CT WITH CONTRAST     Lisinopril Swelling     angioedema    Nitrous Oxide Other (See Comments)     Sense of doom    Chlorhexidine Rash     Rash at site    Sulfa Antibiotics Rash     Muscle stiffness of neck    Dapsone      Methemoglobinemia    Furosemide Other (See Comments)     Skin flushing    Metrogel [Metronidazole]      Hives diffusely on body after vaginal administration.    Azithromycin Dizziness and Rash    Cefuroxime Rash       Social History  Social History     Socioeconomic History    Marital status:      Spouse name: Jt Aleman (culturally )    Number of children: 0    Years of education: Not on file    Highest education level: Not on file   Occupational History    Occupation: Pharmacy Technician   Tobacco Use    Smoking status: Never    Smokeless tobacco: Never   Vaping Use    Vaping Use: Never used   Substance and Sexual Activity    Alcohol use: Never    Drug use: Never    Sexual  "activity: Yes     Partners: Male   Other Topics Concern    Parent/sibling w/ CABG, MI or angioplasty before 65F 55M? Not Asked   Social History Narrative    Date of Service:5/15/2013     Name: Mona VALDOVINOS (Month, Day, Year of birth): 1990     MRN: [id number]     New Patient: Yes    Preferred Language: English     Needed: No    County of Residence: [place]    Marital Status:     Household size: 8    Number of Dependents:0     Pregnant: 0    Average Monthly Income: $ 600    Citizenship Status: Citizen    Gave Pt [employer] and [employer] applications     Social Determinants of Health     Financial Resource Strain: Not on file   Food Insecurity: Not on file   Transportation Needs: Not on file   Physical Activity: Not on file   Stress: Not on file   Social Connections: Not on file   Intimate Partner Violence: Not on file   Housing Stability: Not on file       Family History  Family History   Problem Relation Age of Onset    Kidney Disease Mother     Kidney failure Mother     Coronary Artery Disease Father     Heart Disease Father     Sleep Apnea Father     Neurologic Disorder Father         corticobasal degeneration    Parkinsonism Father     Hypertension Sister     Diabetes Sister     Cerebrovascular Disease Sister     Kidney Disease Sister     Breast Cancer No family hx of     Cancer - colorectal No family hx of     Ovarian Cancer No family hx of     Prostate Cancer No family hx of     Other Cancer No family hx of     Asthma No family hx of     Anesthesia Reaction No family hx of     Deep Vein Thrombosis (DVT) No family hx of     Melanoma No family hx of     Skin Cancer No family hx of     Thrombosis No family hx of        The complete review of systems is negative other than noted in the HPI or here. Anesthesia Evaluation   Pt has had prior anesthetic.     History of anesthetic complications  -  and difficult airway.  reports having pre-op anesthesia in the OR after being \"given gas\" without " any prior sedation or anxiety medication.  The OR staff apparently wasn't ready to proceed so she got anxious when she felt the gas was given too early and she seemed to be alert.    ROS/MED HX  ENT/Pulmonary:     (+) sleep apnea, uses CPAP,         allergic rhinitis,                            Neurologic: Comment: SAH secondary to warfarin in 2014    (+)       seizures, last seizure: 2014 - secondary to SAH,                        Cardiovascular:  - neg cardiovascular ROS   (+)  - -   -  - -           ZEPEDA.                      Previous cardiac testing   Echo: Date: 3/2023 Results:  Interpretation Summary  Global and regional left ventricular function is hyperkinetic with an EF >70%.  Right ventricular function, chamber size, wall motion, and thickness are  normal.  Pulmonary artery systolic pressure is normal.  The inferior vena cava cannot be assessed.  No pericardial effusion is present.    Stress Test:  Date: 1/2022 Results:  Interpretation Summary  Patient exercised 6:52 seconds stopping secondary to fatigue.Patient achieved  79% of maximum predicted heart rate  Reduced exercise tolerance for age  Exercise ECG is non diagnositic secondary to less than 85% of maximum heart  rate achieved.No changes to suggest ischemia at the heart rate/workload  achieved  Resting LV function is normal.Resting LVEF estimated at 55-60%  Post exercise appropriate augmentation of systolic function without observed  regional wall motion abnormality.Technically limited post exercise images  Conclusions: Non diagnostic stress echocardiogram secondary to less than 85%  of maximum heart rate achieved.No ECG or echocardiographic findings to suggest  ischemia at the workload achieved    ECG Reviewed:  Date: 6/2023 Results:    Sinus rhythm  Biatrial enlargement  Indeterminate axis  Pulmonary disease pattern  Prolonged QT  Abnormal ECG      Cath:  Date: Results:      METS/Exercise Tolerance: 4 - Raking leaves, gardening Comment: 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  Can ascend her full flight of stairs.  Not exercising purposefully.  Denies any exertional dyspnea or angina.    Hematologic: Comments: History of a catheter associated embolism in 2013.      (+) History of blood clots,    pt is not anticoagulated, anemia, history of blood transfusion, no previous transfusion reaction,        Musculoskeletal: Comment: Intermittent muscle cramping secondary to electrolyte imbalances.      GI/Hepatic: Comment: Constipation - taking miralax and stool softener prn    (+) GERD, Asymptomatic on medication,                  Renal/Genitourinary: Comment: IgA nephropathy    Right side nephrostomy tube -  anuric    (+) renal disease, type: CRI, Pt does not require dialysis,  Pt has history of transplant, date: 8/3/2019,        Endo: Comment: Hyperparathyroidism  S/p 3.5 lobe parathyroid resection    (+)          thyroid problem, hypothyroidism,    Obesity,       Psychiatric/Substance Use:     (+) psychiatric history anxiety       Infectious Disease:  - neg infectious disease ROS     Malignancy:  - neg malignancy ROS     Other:      (+) Possibly pregnant, , ,           Virtual visit -  No vitals were obtained    Physical Exam  Constitutional: Awake, alert, cooperative, no apparent distress, and appears stated age.  Eyes: Pupils equal  HENT: Normocephalic  Respiratory: non labored breathing   Neurologic: Awake, alert, oriented to name, place and time.   Neuropsychiatric: Calm, cooperative. Normal affect.      Labs / testing: (personally reviewed)    Component      Latest Ref Rng 7/31/2023  10:17 AM   WBC      4.0 - 11.0 10e3/uL 8.7    RBC Count      3.80 - 5.20 10e6/uL 3.26 (L)    Hemoglobin      11.7 - 15.7 g/dL 10.1 (L)    Hematocrit      35.0 - 47.0 % 32.1 (L)    MCV      78 - 100 fL 99    MCH      26.5 - 33.0 pg 31.0    MCHC      31.5 - 36.5 g/dL 31.5    RDW      10.0 - 15.0 % 14.6    Platelet Count      150 - 450 10e3/uL 234    % Neutrophils      % 82     % Lymphocytes      % 10    % Monocytes      % 5    % Eosinophils      % 1    % Basophils      % 1    % Immature Granulocytes      % 1    NRBCs per 100 WBC      <1 /100 0    Absolute Neutrophils      1.6 - 8.3 10e3/uL 7.2    Absolute Lymphocytes      0.8 - 5.3 10e3/uL 0.9    Absolute Monocytes      0.0 - 1.3 10e3/uL 0.4    Absolute Eosinophils      0.0 - 0.7 10e3/uL 0.1    Absolute Basophils      0.0 - 0.2 10e3/uL 0.1    Absolute Immature Granulocytes      <=0.4 10e3/uL 0.1    Absolute NRBCs      10e3/uL 0.0    Sodium      136 - 145 mmol/L 136    Potassium      3.4 - 5.3 mmol/L 4.9    Chloride      98 - 107 mmol/L 104    Carbon Dioxide (CO2)      22 - 29 mmol/L 17 (L)    Anion Gap      7 - 15 mmol/L 15    Urea Nitrogen      6.0 - 20.0 mg/dL 29.6 (H)    Creatinine      0.51 - 0.95 mg/dL 1.33 (H)    Calcium      8.6 - 10.0 mg/dL 9.9    Glucose      70 - 99 mg/dL 85    GFR Estimate      >60 mL/min/1.73m2 55 (L)    Protein Total      6.4 - 8.3 g/dL 7.5    Albumin      3.5 - 5.2 g/dL 3.8    Bilirubin Total      <=1.2 mg/dL 1.4 (H)    Alkaline Phosphatase      35 - 104 U/L 139 (H)    AST      0 - 45 U/L 35    ALT      0 - 50 U/L 29    Bilirubin Direct      0.00 - 0.30 mg/dL 0.54 (H)       Legend:  (L) Low  (H) High    - all cardiac testins - see ROS    Outside records reviewed from:  Care Everywhere    Assessment    Mona Wagner is a 32 year old female seen as a PAC referral for risk assessment and optimization for anesthesia.    Plan/Recommendations  Pt will be optimized for the proposed procedure.  See below for details on the assessment, risk, and preoperative recommendations    NEUROLOGY  - History of Seizure and SAH - 2014    -Post Op delirium risk factors:  No risk identified    ENT  - Patient has previous history of complicated airway.   See 4/28/23 anesthesia report.   Mallampati: Unable to assess  TM: Unable to assess    CARDIAC  - No history of CAD, Hypertension, and Afib  - cardiac testing as above    - METS  (Metabolic Equivalents)  Patient performs 4 or more METS exercise without symptoms            Total Score: 0      RCRI-Very low risk: Class 1 0.4% complication rate            Total Score: 0        PULMONARY  - Obstructive Sleep Apnea  FATOUMATA with home CPAP.      - Denies asthma or inhaler use  - Tobacco History    History   Smoking Status    Never   Smokeless Tobacco    Never       GI  - GERD is well controlled with pepcid.   PONV Medium Risk  Total Score: 2           1 AN PONV: Pt is Female    1 AN PONV: Patient is not a current smoker        /RENAL  - s/p kidney transplant on 8/3/19 for IgA nephropathy.  Followed by the transplant team.  Continue all transplant meds without interruption.  - right nephrostomy tube in place currently.  Transplant team and urology following.  She reports that they plan to leave this in until at least a month after delivery.  Consider cautious positioning during delivery.    - anuric due to nephrostomy tube.    ENDOCRINE   - BMI: Estimated body mass index is 34.18 kg/m  as calculated from the following:    Height as of 7/28/23: 1.524 m (5').    Weight as of 7/28/23: 79.4 kg (175 lb).    - Thyroid disorder  Continue home replacement while hospitalized.    - s/p 3.5 lobe parathyroid resection for hypercalcemia.  Endocrine following.     - has had some issues with hyperkalemia.  Managed with prn [place].  Consider recheck on day of induction.    HEME  VTE Low Risk 0.5%            Total Score: 3    VTE: Pt history of VTE      - No history of abnormal bleeding or antiplatelet use.  - Chronic anemia.  On aranesp.   Recommend perioperative use of blood conservation techniques intraoperatively and close monitoring for postoperative bleeding.      Delivery planning  - induction for vaginal delivery between 37 and 39 weeks.  - patient considering epidural anesthesia.         Tentative Obstetrical Anesthesia Treatment plan developed in collaboration with the attending anesthesiologist   Janak.    Staff message sent to Dr. Curiel to notify of today's PAC visit and to also request that she call the patient to discuss epidural anesthesia further as patient has many questions and concerns.    Please refer to the physical examination documented by the anesthesiologist in the anesthesia record on the day of surgery.    Video-Visit Details    Type of service:  Video Visit    Provider received verbal consent for a Video Visit from the patient? Yes   Video Start Time:  1556  Video End Time:1624    Originating Location (pt. Location): Home    Distant Location (provider location):  Off-site  Mode of Communication:  Video Conference via [employer]  On the day of service:     Prep time: 14 minutes  Visit time: 28 minutes  Documentation/communication time: 22 minutes  ------------------------------------------  Total time: 64 minutes    ALCIDES Kathleen CNP    Preoperative Assessment Center  [facility]  Office phone: [phone]  Fax: [phone]

## 2023-08-02 ENCOUNTER — OFFICE VISIT (OUTPATIENT)
Dept: MATERNAL FETAL MEDICINE | Facility: CLINIC | Age: 33
End: 2023-08-02
Attending: ADVANCED PRACTICE MIDWIFE
Payer: MEDICARE

## 2023-08-02 ENCOUNTER — HOSPITAL ENCOUNTER (OUTPATIENT)
Dept: ULTRASOUND IMAGING | Facility: CLINIC | Age: 33
Discharge: HOME | End: 2023-08-02
Attending: ADVANCED PRACTICE MIDWIFE
Payer: MEDICARE

## 2023-08-02 VITALS
HEART RATE: 95 BPM | OXYGEN SATURATION: 98 % | BODY MASS INDEX: 34.16 KG/M2 | WEIGHT: 174.9 LBS | DIASTOLIC BLOOD PRESSURE: 82 MMHG | RESPIRATION RATE: 16 BRPM | SYSTOLIC BLOOD PRESSURE: 129 MMHG

## 2023-08-02 DIAGNOSIS — N18.30 STAGE 3 CHRONIC KIDNEY DISEASE, UNSPECIFIED WHETHER STAGE 3A OR 3B CKD (H): ICD-10-CM

## 2023-08-02 DIAGNOSIS — Z94.0 KIDNEY REPLACED BY TRANSPLANT: ICD-10-CM

## 2023-08-02 DIAGNOSIS — N28.9 DISORDER OF KIDNEY AND URETER, UNSPECIFIED: ICD-10-CM

## 2023-08-02 DIAGNOSIS — O09.893 CURRENT PREGNANCY IN THIRD TRIMESTER WITH HISTORY OF KIDNEY TRANSPLANTATION: Primary | ICD-10-CM

## 2023-08-02 DIAGNOSIS — Z94.0 CURRENT PREGNANCY IN THIRD TRIMESTER WITH HISTORY OF KIDNEY TRANSPLANTATION: Primary | ICD-10-CM

## 2023-08-02 DIAGNOSIS — L29.9 ITCHING: Primary | ICD-10-CM

## 2023-08-02 PROBLEM — Z36.89 ENCOUNTER FOR TRIAGE IN PREGNANT PATIENT: Status: RESOLVED | Noted: 2023-07-02 | Resolved: 2023-08-02

## 2023-08-02 PROCEDURE — G0463 HOSPITAL OUTPT CLINIC VISIT: HCPCS | Mod: 25 | Performed by: ADVANCED PRACTICE MIDWIFE

## 2023-08-02 PROCEDURE — 87086 URINE CULTURE/COLONY COUNT: CPT | Performed by: ADVANCED PRACTICE MIDWIFE

## 2023-08-02 PROCEDURE — 76819 FETAL BIOPHYS PROFIL W/O NST: CPT | Mod: 26 | Performed by: OBSTETRICS & GYNECOLOGY

## 2023-08-02 PROCEDURE — 99213 OFFICE O/P EST LOW 20 MIN: CPT | Mod: 25 | Performed by: ADVANCED PRACTICE MIDWIFE

## 2023-08-02 PROCEDURE — 76819 FETAL BIOPHYS PROFIL W/O NST: CPT

## 2023-08-02 NOTE — PROGRESS NOTES
"Please see \"Imaging\" tab under \"Chart Review\" for details of today's visit.    Tiffany Montiel    "

## 2023-08-02 NOTE — PATIENT INSTRUCTIONS
"Weeks 32 to 34 of Your Pregnancy: Care Instructions    Decide whether you want to bank or donate your baby's umbilical cord blood. If you want to save this blood, you have to arrange for it ahead of time.   Decide about circumcision. Personal, Lutheran, or cultural beliefs may play a role in your decision. You get to decide what you want for your baby.     Learn how to ease hemorrhoids.    Get more liquids, fruits, vegetables, and fiber in your diet.  Avoid sitting for too long.  Clean yourself with moist toilet paper. Or try witch hazel pads.  Try ice packs or warm sitz baths for discomfort.  Use hydrocortisone cream for pain or itching.  Ask your doctor about stool softeners.    Consider the benefits of breastfeeding.    It reduces your baby's risk of sudden infant death syndrome (SIDS).   babies are less likely to get certain infections. And they're less likely to be obese or get diabetes later in life.  It can lower your risk of breast and ovarian cancers and osteoporosis.  It saves you money.  Follow-up care is a key part of your treatment and safety. Be sure to make and go to all appointments, and call your doctor if you are having problems. It's also a good idea to know your test results and keep a list of the medicines you take.  Where can you learn more?  Go to https://www.[employer].net/patiented  Enter X711 in the search box to learn more about \"Weeks 32 to 34 of Your Pregnancy: Care Instructions.\"  Current as of: November 9, 2022               Content Version: 13.7    5063-6383 [employer].   Care instructions adapted under license by your healthcare professional. If you have questions about a medical condition or this instruction, always ask your healthcare professional. [employer] disclaims any warranty or liability for your use of this information.      "

## 2023-08-02 NOTE — PROGRESS NOTES
Maternal fetal Medicine OB Follow up visit.      Mona Wagner  : 1990  MRN: [id number]     CC: OB Follow-up     Subjective:  Mona Wagner is a 32 year old  at 32w5d presenting for routine OB follow-up. Today, she is feeling well overall.  Had called earlier in the week with itching and had cholestasis labs drawn.  Notes her itching has again improved since reducing her tacro dose.  Sarna lotion has also been helpful with itching. Itching is worse at night.  Bili was elevated on most recent labs. Pt denies RUQ pain.  She is taking her daily Keflex without issue.  Home BP checks are 115-120s/80s with the exception of one elevation of 158/95 which she rechecked shortly after and was WNL.  Notes increased stress due to her father being admitted to hospice--he has been ill since 2017 with a degenerative brain/neuro disorder and is now unable to swallow.  She does have one sibling and has been able to delegate some of the responsibility surrounding her father.  She is trying to be mindful of reducing stress during pregnancy.  Notes she had one episode of ctx today that resolved with PO hydration.  Reviewed  labor warning signs.  Plans to have nephrostomy tube changed prior to delivery, wondering about sedation and fluoroscopy--ok per Dr. Montiel.     OB Hx:                   OB History    Para Term  AB Living   1 0 0 0 0 0   SAB IAB Ectopic Multiple Live Births      0 0 0 0 0          # Outcome Date GA Lbr Robinson/2nd Weight Sex Delivery Anes PTL Lv   1 Current                              Objective:  /82, P 95, R 16, 02 98%     Gen: alert, oriented, NAD  Skin: warm, dry, intact  Respiratory: breathing unlabored, no SOB  Abdominal: gravid, non-tender, PNT tube site covered with dressing C/D/I  Pelvic: deferred  Extremities: no edema noted, AV fistula in place on left forearm.  Psych: mood WNL, behavior WNL    Labs collected  due to itching: Total bili 1.4, ALK phosphatase 139, AST/ALT  "WNL  Bile acids: 10        OB Ultrasound:  Please see \"imaging\" tab under chart review for today's ultrasound results.  BPP 8/8, normal JOSÉ ANTONIO     Assessment/Plan:  32 year old  at 30w5d here for follow OB visit.     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 Mcintyre in transplant nephrology. Resolution of cHTN s/p transplant.  Next visit 23  - Nephrostomy tube placed on  with subsequent hyperkalemia due to profound post obstruction diuresis after placement. This resolved however K 5.5 on 7/10/23.  Started on Lokelma and bicarbonate with plan for outpatient management per Dr. Hill.  Recheck improved on   - Continue with Tacrolimus (goal 4-6) and Azathioprine as prescribed by nephrology.   - Baseline Cr 0.8-1.1. : 1.1  - Urine protein/creatinine stable, last 0.51  - Close management of blood pressure with goal of <130/90  - Early detection and treatment of asymptomatic bacteriuria with urine culture at least every trimester. UC collected today  - Urology   - Nephrology   - Itching improved after again decreasing tacrolimus dose, recommend continuing daily Keflex prophylaxis  Itching with borderline bile acids  -Bilirubin elevated 23  -Repeat fasting labs 8/3/23     Hypothyroidism:  Secondary Hyperparathyroidism with hypercalcemia   - Resection of parathyroid glands on .   - Follows with endo. Continue " current levothyroxine prescription 37.5 mcg daily.  - 23 TSH 1.32  - Last endocrinology visit      Hx DVT:  - Assessment for inherited thrombophilias including Factor V Leiden and Prothrombin Gene Mutation:  Negative.  - Additional hypercoagulability work up negative.     Hx of bacterial endocarditis:  - Echo on 3/20:  Interpretation Summary  Global and regional left ventricular function is hyperkinetic with an EF >70%.  Right ventricular function, chamber size, wall motion, and thickness are  normal.  Pulmonary artery systolic pressure is normal.  The inferior vena cava cannot be assessed.  No pericardial effusion is present.     Routine PNC:  - Prenatal labs:               Rh: +  antibody: neg               HepB/HIV/RPR/HepC: nonreactive               GC/CT: neg               Rubella: non-immune  Varicella: non-immune               GCT: passed early                28 wkGCT: elevated    GTT: passed                          UC: no growth               Pap: NIL and HPV neg  - Immunizations: s/p Flu and Covid. TDAP given 2023  - Genetic screening: NIPT low-risk. Low-risk NT      Surveillance:  FGR AC 9% with normal UAR: RESOLVED on   - Serial growth US   - Weekly NST with UAR     Delivery Planning:  - Timing of delivery will be indicated by secondary sequelae and if fetal FGR returns, but generally 37-39 weeks.  reserved for usual obstetrical indications.   - Labor analgesia: considering epidural  - Feeding: plans to formula feed  - Contraception: possibly minipill.  Has used in the past and it worked well for her.  Will need to avoid estrogen methods due to h/o DVT     RTC in 1 week     25 minutes spent on the date of the encounter, doing chart review, history and exam, documentation and further activities as noted.        aNthalia Lubin, APRN, CNM

## 2023-08-02 NOTE — NURSING NOTE
Mona seen in clinic today for OB visit at 32w5d gestation. VSS. Pt reports normal fetal movement, see flowsheet. Pt evaluated for  labor, preeclampsia, infection, fetal wellbeing. Patient reports Baca Kumar throughout the day today with some uncomfortable cramps. Feels she has not hydrated well today, encouraged good fluid intake moving forward. Understanding of when to call or present to [facility] with symptoms concerning for  labor. Evaluated for continued itching, patient reports that a decrease in her tacrolimus dose and sarna lotion seemed to help yesterday and she is feeling a bit better. Evaluated home BPs, patient reports she has had two elevated BPs, the highest being 158/95. BP parameters reviewed, handout given. Plan for repeat HELLP labs along with bile acids tomorrow. Urine culture sent today per prenatal care plan. Pt denies bleeding/lof/change in vaginal discharge/contractions/headache/vision changes/chest pain/SOB/edema. Pt notified to review new pt education in AVS, verbally reviewed highlights. Nathalia Lubin CNM met with pt and discussed POC. Plan for return next week as scheduled. Pt discharged stable and ambulatory.

## 2023-08-03 ENCOUNTER — HOSPITAL ENCOUNTER (INPATIENT)
Facility: CLINIC | Age: 33
LOS: 12 days | Discharge: HOME | End: 2023-08-15
Attending: OBSTETRICS & GYNECOLOGY | Admitting: OBSTETRICS & GYNECOLOGY
Payer: MEDICARE

## 2023-08-03 ENCOUNTER — TELEPHONE (OUTPATIENT)
Dept: MATERNAL FETAL MEDICINE | Facility: CLINIC | Age: 33
End: 2023-08-03

## 2023-08-03 ENCOUNTER — TELEPHONE (OUTPATIENT)
Dept: TRANSPLANT | Facility: CLINIC | Age: 33
End: 2023-08-03

## 2023-08-03 ENCOUNTER — PATIENT OUTREACH (OUTPATIENT)
Dept: CARE COORDINATION | Facility: CLINIC | Age: 33
End: 2023-08-03

## 2023-08-03 ENCOUNTER — MYC MEDICAL ADVICE (OUTPATIENT)
Dept: TRANSPLANT | Facility: CLINIC | Age: 33
End: 2023-08-03

## 2023-08-03 ENCOUNTER — LAB (OUTPATIENT)
Dept: LAB | Facility: HOSPITAL | Age: 33
End: 2023-08-03
Payer: MEDICARE

## 2023-08-03 DIAGNOSIS — Z94.0 KIDNEY REPLACED BY TRANSPLANT: ICD-10-CM

## 2023-08-03 DIAGNOSIS — O16.2 ELEVATED BLOOD PRESSURE AFFECTING PREGNANCY IN SECOND TRIMESTER, ANTEPARTUM: ICD-10-CM

## 2023-08-03 DIAGNOSIS — Z48.298 AFTERCARE FOLLOWING ORGAN TRANSPLANT: ICD-10-CM

## 2023-08-03 DIAGNOSIS — O23.03 PYELONEPHRITIS AFFECTING PREGNANCY IN THIRD TRIMESTER: Primary | ICD-10-CM

## 2023-08-03 DIAGNOSIS — L29.9 ITCHING: ICD-10-CM

## 2023-08-03 DIAGNOSIS — E03.9 ACQUIRED HYPOTHYROIDISM: ICD-10-CM

## 2023-08-03 DIAGNOSIS — N18.30 STAGE 3 CHRONIC KIDNEY DISEASE, UNSPECIFIED WHETHER STAGE 3A OR 3B CKD (H): ICD-10-CM

## 2023-08-03 LAB
ABO/RH(D): NORMAL
ALBUMIN SERPL BCG-MCNC: 3.5 G/DL (ref 3.5–5.2)
ALBUMIN SERPL BCG-MCNC: 3.7 G/DL (ref 3.5–5.2)
ALP SERPL-CCNC: 149 U/L (ref 35–104)
ALP SERPL-CCNC: 151 U/L (ref 35–104)
ALT SERPL W P-5'-P-CCNC: 36 U/L (ref 0–50)
ALT SERPL W P-5'-P-CCNC: 37 U/L (ref 0–50)
ANION GAP SERPL CALCULATED.3IONS-SCNC: 12 MMOL/L (ref 7–15)
ANION GAP SERPL CALCULATED.3IONS-SCNC: 12 MMOL/L (ref 7–15)
ANTIBODY SCREEN: NEGATIVE
AST SERPL W P-5'-P-CCNC: 32 U/L (ref 0–45)
AST SERPL W P-5'-P-CCNC: 34 U/L (ref 0–45)
BASOPHILS # BLD AUTO: 0.1 10E3/UL (ref 0–0.2)
BASOPHILS NFR BLD AUTO: 1 %
BILIRUB SERPL-MCNC: 1.4 MG/DL
BILIRUB SERPL-MCNC: 1.5 MG/DL
BUN SERPL-MCNC: 34.8 MG/DL (ref 6–20)
BUN SERPL-MCNC: 35.4 MG/DL (ref 6–20)
CALCIUM SERPL-MCNC: 9.4 MG/DL (ref 8.6–10)
CALCIUM SERPL-MCNC: 9.6 MG/DL (ref 8.6–10)
CHLORIDE SERPL-SCNC: 103 MMOL/L (ref 98–107)
CHLORIDE SERPL-SCNC: 103 MMOL/L (ref 98–107)
CREAT SERPL-MCNC: 1.64 MG/DL (ref 0.51–0.95)
CREAT SERPL-MCNC: 1.67 MG/DL (ref 0.51–0.95)
DEPRECATED HCO3 PLAS-SCNC: 21 MMOL/L (ref 22–29)
DEPRECATED HCO3 PLAS-SCNC: 21 MMOL/L (ref 22–29)
EOSINOPHIL # BLD AUTO: 0.1 10E3/UL (ref 0–0.7)
EOSINOPHIL NFR BLD AUTO: 2 %
ERYTHROCYTE [DISTWIDTH] IN BLOOD BY AUTOMATED COUNT: 14.6 % (ref 10–15)
ERYTHROCYTE [DISTWIDTH] IN BLOOD BY AUTOMATED COUNT: 14.6 % (ref 10–15)
GFR SERPL CREATININE-BSD FRML MDRD: 42 ML/MIN/1.73M2
GFR SERPL CREATININE-BSD FRML MDRD: 43 ML/MIN/1.73M2
GLUCOSE SERPL-MCNC: 107 MG/DL (ref 70–99)
GLUCOSE SERPL-MCNC: 80 MG/DL (ref 70–99)
HCT VFR BLD AUTO: 34.2 % (ref 35–47)
HCT VFR BLD AUTO: 34.5 % (ref 35–47)
HGB BLD-MCNC: 10.6 G/DL (ref 11.7–15.7)
HGB BLD-MCNC: 11 G/DL (ref 11.7–15.7)
IMM GRANULOCYTES # BLD: 0.1 10E3/UL
IMM GRANULOCYTES NFR BLD: 1 %
LYMPHOCYTES # BLD AUTO: 0.8 10E3/UL (ref 0.8–5.3)
LYMPHOCYTES NFR BLD AUTO: 10 %
MCH RBC QN AUTO: 30.7 PG (ref 26.5–33)
MCH RBC QN AUTO: 30.9 PG (ref 26.5–33)
MCHC RBC AUTO-ENTMCNC: 31 G/DL (ref 31.5–36.5)
MCHC RBC AUTO-ENTMCNC: 31.9 G/DL (ref 31.5–36.5)
MCV RBC AUTO: 100 FL (ref 78–100)
MCV RBC AUTO: 96 FL (ref 78–100)
MONOCYTES # BLD AUTO: 0.6 10E3/UL (ref 0–1.3)
MONOCYTES NFR BLD AUTO: 7 %
NEUTROPHILS # BLD AUTO: 6.3 10E3/UL (ref 1.6–8.3)
NEUTROPHILS NFR BLD AUTO: 79 %
NRBC # BLD AUTO: 0 10E3/UL
NRBC BLD AUTO-RTO: 0 /100
PLATELET # BLD AUTO: 253 10E3/UL (ref 150–450)
PLATELET # BLD AUTO: 261 10E3/UL (ref 150–450)
POTASSIUM SERPL-SCNC: 5.2 MMOL/L (ref 3.4–5.3)
POTASSIUM SERPL-SCNC: 5.3 MMOL/L (ref 3.4–5.3)
PROT SERPL-MCNC: 7.1 G/DL (ref 6.4–8.3)
PROT SERPL-MCNC: 7.3 G/DL (ref 6.4–8.3)
RBC # BLD AUTO: 3.43 10E6/UL (ref 3.8–5.2)
RBC # BLD AUTO: 3.58 10E6/UL (ref 3.8–5.2)
SODIUM SERPL-SCNC: 136 MMOL/L (ref 136–145)
SODIUM SERPL-SCNC: 136 MMOL/L (ref 136–145)
SPECIMEN EXPIRATION DATE: NORMAL
TACROLIMUS BLD-MCNC: 5.6 UG/L (ref 5–15)
TME LAST DOSE: NORMAL H
TME LAST DOSE: NORMAL H
WBC # BLD AUTO: 7.9 10E3/UL (ref 4–11)
WBC # BLD AUTO: 8.4 10E3/UL (ref 4–11)

## 2023-08-03 PROCEDURE — 86901 BLOOD TYPING SEROLOGIC RH(D): CPT | Performed by: OBSTETRICS & GYNECOLOGY

## 2023-08-03 PROCEDURE — 99222 1ST HOSP IP/OBS MODERATE 55: CPT | Mod: 25 | Performed by: OBSTETRICS & GYNECOLOGY

## 2023-08-03 PROCEDURE — 86850 RBC ANTIBODY SCREEN: CPT | Performed by: OBSTETRICS & GYNECOLOGY

## 2023-08-03 PROCEDURE — 85027 COMPLETE CBC AUTOMATED: CPT | Performed by: OBSTETRICS & GYNECOLOGY

## 2023-08-03 PROCEDURE — 250N000013 HC RX MED GY IP 250 OP 250 PS 637: Performed by: OBSTETRICS & GYNECOLOGY

## 2023-08-03 PROCEDURE — 250N000012 HC RX MED GY IP 250 OP 636 PS 637

## 2023-08-03 PROCEDURE — 82239 BILE ACIDS TOTAL: CPT

## 2023-08-03 PROCEDURE — 36415 COLL VENOUS BLD VENIPUNCTURE: CPT

## 2023-08-03 PROCEDURE — 36415 COLL VENOUS BLD VENIPUNCTURE: CPT | Performed by: OBSTETRICS & GYNECOLOGY

## 2023-08-03 PROCEDURE — 120N000002 HC R&B MED SURG/OB UMMC

## 2023-08-03 PROCEDURE — 250N000011 HC RX IP 250 OP 636: Mod: JZ | Performed by: SURGERY

## 2023-08-03 PROCEDURE — 999N000127 HC STATISTIC PERIPHERAL IV START W US GUIDANCE

## 2023-08-03 PROCEDURE — 258N000003 HC RX IP 258 OP 636

## 2023-08-03 PROCEDURE — 80053 COMPREHEN METABOLIC PANEL: CPT

## 2023-08-03 PROCEDURE — 59025 FETAL NON-STRESS TEST: CPT | Mod: 26 | Performed by: OBSTETRICS & GYNECOLOGY

## 2023-08-03 PROCEDURE — 76815 OB US LIMITED FETUS(S): CPT | Mod: 26 | Performed by: OBSTETRICS & GYNECOLOGY

## 2023-08-03 PROCEDURE — 80197 ASSAY OF TACROLIMUS: CPT

## 2023-08-03 PROCEDURE — 85027 COMPLETE CBC AUTOMATED: CPT

## 2023-08-03 PROCEDURE — 84450 TRANSFERASE (AST) (SGOT): CPT | Performed by: OBSTETRICS & GYNECOLOGY

## 2023-08-03 PROCEDURE — 84155 ASSAY OF PROTEIN SERUM: CPT | Performed by: OBSTETRICS & GYNECOLOGY

## 2023-08-03 PROCEDURE — 250N000013 HC RX MED GY IP 250 OP 250 PS 637

## 2023-08-03 PROCEDURE — 999N000040 HC STATISTIC CONSULT NO CHARGE VASC ACCESS

## 2023-08-03 RX ORDER — ASPIRIN 81 MG/1
81 TABLET, CHEWABLE ORAL DAILY
Status: DISCONTINUED | OUTPATIENT
Start: 2023-08-04 | End: 2023-08-12

## 2023-08-03 RX ORDER — FAMOTIDINE 20 MG/1
20 TABLET, FILM COATED ORAL DAILY PRN
Status: DISCONTINUED | OUTPATIENT
Start: 2023-08-03 | End: 2023-08-15 | Stop reason: HOSPADM

## 2023-08-03 RX ORDER — DOCUSATE SODIUM 100 MG/1
100 CAPSULE, LIQUID FILLED ORAL DAILY
Status: ON HOLD | COMMUNITY
End: 2023-08-15

## 2023-08-03 RX ORDER — LEVOTHYROXINE SODIUM 50 UG/1
50 TABLET ORAL DAILY
Status: DISCONTINUED | OUTPATIENT
Start: 2023-08-04 | End: 2023-08-13

## 2023-08-03 RX ORDER — LANOLIN ALCOHOL/MO/W.PET/CERES
1000 CREAM (GRAM) TOPICAL EVERY MORNING
Status: DISCONTINUED | OUTPATIENT
Start: 2023-08-04 | End: 2023-08-15 | Stop reason: HOSPADM

## 2023-08-03 RX ORDER — SODIUM CHLORIDE, SODIUM LACTATE, POTASSIUM CHLORIDE, CALCIUM CHLORIDE 600; 310; 30; 20 MG/100ML; MG/100ML; MG/100ML; MG/100ML
INJECTION, SOLUTION INTRAVENOUS
Status: COMPLETED
Start: 2023-08-03 | End: 2023-08-03

## 2023-08-03 RX ORDER — AMOXICILLIN 250 MG
1 CAPSULE ORAL 2 TIMES DAILY
Status: DISCONTINUED | OUTPATIENT
Start: 2023-08-03 | End: 2023-08-05

## 2023-08-03 RX ORDER — CEFTAZIDIME 1 G/1
1 INJECTION, POWDER, FOR SOLUTION INTRAMUSCULAR; INTRAVENOUS EVERY 12 HOURS
Status: DISCONTINUED | OUTPATIENT
Start: 2023-08-03 | End: 2023-08-06

## 2023-08-03 RX ORDER — HYDROCORTISONE 2.5 %
CREAM (GRAM) TOPICAL 2 TIMES DAILY PRN
Status: DISCONTINUED | OUTPATIENT
Start: 2023-08-03 | End: 2023-08-15 | Stop reason: HOSPADM

## 2023-08-03 RX ORDER — HYDROXYZINE HYDROCHLORIDE 10 MG/1
10 TABLET, FILM COATED ORAL EVERY 6 HOURS PRN
Status: ACTIVE | OUTPATIENT
Start: 2023-08-03 | End: 2023-08-06

## 2023-08-03 RX ORDER — PRENATAL VIT/IRON FUM/FOLIC AC 27MG-0.8MG
1 TABLET ORAL EVERY EVENING
Status: DISCONTINUED | OUTPATIENT
Start: 2023-08-03 | End: 2023-08-15 | Stop reason: HOSPADM

## 2023-08-03 RX ORDER — VITAMIN B COMPLEX
50 TABLET ORAL EVERY MORNING
Status: DISCONTINUED | OUTPATIENT
Start: 2023-08-04 | End: 2023-08-15 | Stop reason: HOSPADM

## 2023-08-03 RX ORDER — AMOXICILLIN 250 MG
2 CAPSULE ORAL 2 TIMES DAILY
Status: DISCONTINUED | OUTPATIENT
Start: 2023-08-03 | End: 2023-08-05

## 2023-08-03 RX ORDER — AZATHIOPRINE 50 MG/1
150 TABLET ORAL EVERY EVENING
Status: DISCONTINUED | OUTPATIENT
Start: 2023-08-03 | End: 2023-08-12

## 2023-08-03 RX ORDER — SODIUM BICARBONATE 650 MG/1
1300 TABLET ORAL 3 TIMES DAILY
Status: DISCONTINUED | OUTPATIENT
Start: 2023-08-03 | End: 2023-08-12

## 2023-08-03 RX ORDER — TACROLIMUS 5 MG/1
5 CAPSULE ORAL 2 TIMES DAILY
Status: DISCONTINUED | OUTPATIENT
Start: 2023-08-03 | End: 2023-08-05

## 2023-08-03 RX ORDER — ACETAMINOPHEN 325 MG/1
650 TABLET ORAL EVERY 4 HOURS PRN
Status: DISCONTINUED | OUTPATIENT
Start: 2023-08-03 | End: 2023-08-08 | Stop reason: HOSPADM

## 2023-08-03 RX ADMIN — CEFTAZIDIME 1 G: 1 INJECTION, POWDER, FOR SOLUTION INTRAMUSCULAR; INTRAVENOUS at 20:22

## 2023-08-03 RX ADMIN — ACETAMINOPHEN 650 MG: 325 TABLET, FILM COATED ORAL at 22:01

## 2023-08-03 RX ADMIN — HYDROCORTISONE: 25 CREAM TOPICAL at 22:03

## 2023-08-03 RX ADMIN — AZATHIOPRINE 150 MG: 50 TABLET ORAL at 22:00

## 2023-08-03 RX ADMIN — SODIUM BICARBONATE 650 MG TABLET 1300 MG: at 21:58

## 2023-08-03 RX ADMIN — FAMOTIDINE 20 MG: 20 TABLET ORAL at 20:49

## 2023-08-03 RX ADMIN — SODIUM CHLORIDE, POTASSIUM CHLORIDE, SODIUM LACTATE AND CALCIUM CHLORIDE 500 ML: 600; 310; 30; 20 INJECTION, SOLUTION INTRAVENOUS at 19:48

## 2023-08-03 RX ADMIN — TACROLIMUS 5 MG: 5 CAPSULE ORAL at 21:59

## 2023-08-03 RX ADMIN — PRENATAL VITAMINS-IRON FUMARATE 27 MG IRON-FOLIC ACID 0.8 MG TABLET 1 TABLET: at 20:50

## 2023-08-03 ASSESSMENT — ACTIVITIES OF DAILY LIVING (ADL)
ADLS_ACUITY_SCORE: 35
ADLS_ACUITY_SCORE: 31

## 2023-08-03 NOTE — LETTER
August 4, 2023      To Whom It May Concern:      Jt Aleman's partner was admitted to the hospital on 8/3/2023. She remains in the hospital at this time.    Sincerely,        Damian Guajardo MD

## 2023-08-03 NOTE — TELEPHONE ENCOUNTER
Elevated creatinine, positive UC ordered by [facility].  Patient states she noticed blood this morning in the PNT.  With increased hydration she states this did resolve.  Reports contracts ~5 per hour.  States the contraction did wake her up last night.  She also reports some vaginal cramping.  Spoke with M RN,  RN to call patient to instruct her to report to L&D for evaluation    Patient is aware.    Jennifer Trejo RN   Transplant Coordinator  [phone]

## 2023-08-03 NOTE — PROGRESS NOTES
Anemia Management Note  SUBJECTIVE/OBJECTIVE:  Referred by Dr. Gelacio Mcintyre on 2023  Primary Diagnosis: Anemia in Chronic Kidney Disease (N18.3, D63.1)   3a  Secondary Diagnosis:  Chronic Kidney Disease, Stage 3 (N18.3)  3a  Hgb goal range:  9-10  Kidney Tx: 8/3/2019  Epo/Darbo: Aranesp 40mcg every 14 days for Hgb <10.0. In Clinic.   Iron regimen:  Prenatal vit with Iron.   *Elevated Ferritin levels.   Labs : 2024  Recent CHARLINE use, transfusion, IV iron: Aranesp .  RX/TX plans : 2024     *Prefers [facility]      Hx of DVT (), High Risk Pregnancy (2nd trimester).     Contact: OK to speak with Stephan Wagner (father) and Savanah Wagner (sister) regarding Medical Information per consent to communicate dated 4/3/2020.        Latest Ref Rng & Units 2023    10:24 AM 2023    10:11 AM 2023    11:03 AM 2023    10:01 AM 2023    10:30 AM 2023    10:17 AM 8/3/2023    10:14 AM   Anemia   CHARLINE Dose    40mcg       Hemoglobin 11.7 - 15.7 g/dL 8.6  9.5   9.9  9.6  10.1  11.0    Ferritin 6 - 175 ng/mL      1,525       BP Readings from Last 3 Encounters:   23 129/82   23 131/87   23 125/80     Wt Readings from Last 2 Encounters:   23 79.3 kg (174 lb 14.4 oz)   23 79.4 kg (175 lb)           ASSESSMENT:  Hgb:Above goal - recommend hold dose  TSat: at goal >30% Ferritin: Elevated (>1000ng/mL)    PLAN:  Hold Aranesp and RTC for hgb then aranesp if needed in 2 week(s)    Orders needed to be renewed (for next follow-up date) in EPIC: None    Iron labs due:  End of Aug 2023    Plan discussed with:  Mona       NEXT FOLLOW-UP DATE:  23    Manjula Mckinnon RN   Anemia Services  [facility]  [email or web address]   Office : [phone]  Fax: [phone]

## 2023-08-03 NOTE — H&P
[facility]  OB History and Physical      Mona Wagner MRN# [id number]   Age: 32 year old YOB: 1990     CC:  Dr. Mcintyre    HPI:  Ms. Mona Wagner is a 32 year old  at 32w6d who presents for admission with evidence of worsening renal function and concerns for possible early Pylenephritis.  She reports that today she is noting some increased tenderness near her nephrostomy tube and a culture that was performed on urine from the nephrostomy tube is growing 50,000-100,000 CFU/mL Stenotrophomonas maltophilia She reports intermittent contractions, no vaginal bleeding or increased discharge, and no loss of fluid.   + normal fetal movement. She denies fever but has noted increased PM itching and she reports she thinks she is dehydrated and has not been drinking enough. She feels the vistaril is making her sedated so she is not drinking as much.    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 Now H/H at 10.6/34.2  -previous admission for ecoli pyelonephritis in early July on Keflex 500 mg at bedtime for supression     Prenatal Labs:   Lab Results   Component Value Date    ABO O 2019    RH Pos 2019    AS Negative 2023    HEPBANG Nonreactive 03/15/2023    CHPCRT Negative 03/15/2023    GCPCRT Negative 03/15/2023    TREPAB Negative 2013    HGB 11.0 (L) 2023       GBS Status:   No results found for: GBS    Ultrasounds  1. 2023 EFW 1444 grams 13%, AC 32 %tile, No abnl, MVP 5.1 cm  2. 2023 BPP 8/8 MVP 4.3 cm,  cephalic    OB History  OB History    Para Term  AB Living   1 0 0 0 0 0   SAB IAB Ectopic Multiple Live Births   0 0 0 0 0      # Outcome Date GA Lbr Robinson/2nd Weight Sex Delivery Anes PTL Lv   1 Current                PMHx:  Past Medical History:   Diagnosis Date     Anemia in chronic kidney disease      Bacteremia 2023     Difficult intubation     see 23 anesthesia notes     History of bacterial endocarditis      History of blood transfusion      History of DVT (deep vein thrombosis)      History of seizure      History of subarachnoid hemorrhage      Hydronephrosis      Hypothyroidism      Kidney transplanted 2019    DCD DDKT. Induction with thymo 6mg/kg.     Orthostatic hypotension      FATOUMATA (obstructive sleep apnea)      Pyelonephritis of transplanted kidney 2020     Secondary renal hyperparathyroidism (H)      Urinary tract infection      PSHx:   Past Surgical History:   Procedure Laterality Date     BENCH KIDNEY N/A 8/3/2019    Procedure: BACKBENCH PREPARATION, ALLOGRAFT, KIDNEY;  Surgeon: Dorian Johnson MD;  Location: [facility] OR     COMBINED CYSTOSCOPY, RETROGRADES, EXCHANGE STENT URETER(S) Right 2020    Procedure: CYSTOSCOPY, CYSOTGRAM, WITH RETROGRADE PYELOGRAM, ureteroscopy,  AND URETERAL STENT;  Surgeon: Wally Britt MD;  Location: [facility] OR     COMBINED CYSTOSCOPY, RETROGRADES, URETEROSCOPY, LASER HOLMIUM LITHOTRIPSY URETER(S), INSERT STENT N/A 2019    Procedure: CYSTOURETEROSCOPY, WITH RETROGRADE PYELOGRAM of transplant kidney, STENT INSERTION, Laser on standby;  Surgeon: Wally Britt MD;  Location: [facility] OR     CREATE FISTULA ARTERIOVENOUS UPPER EXTREMITY  2014    Procedure: CREATE FISTULA ARTERIOVENOUS UPPER EXTREMITY;  Left Wrist Arteriovenous Fistula Placement;  Surgeon: Shashi Castro MD;  Location: [facility] OR     CREATE FISTULA ARTERIOVENOUS UPPER EXTREMITY       CYSTOSCOPY, RETROGRADES, INSERT STENT URETER(S), COMBINED N/A  2020    Procedure: CYSTOSCOPY, WITH RETROGRADE PYELOGRAM, CYSTOGRAM AND URETERAL STENT INSERTION - TRANSPLANT KIDNEY;  Surgeon: Wally Britt MD;  [facility] OR     IR CEREBRAL ANGIOGRAM  2014     IR NEPHROSTOMY TUBE PLACEMENT RIGHT  2023     PARATHYROIDECTOMY Bilateral 2023    Procedure: Bilateral Resection of 3 and 1/2 parathyroid glands;  Surgeon: Melissa Jones MD;  Location: [facility] OR     PERCUTANEOUS BIOPSY KIDNEY Right 2019    Procedure: Right Kidney Biopsy;  Surgeon: Deny Rios MD;  Location: [facility]     RASTA/DIALYSIS CATHETER  12/10/2013          TRANSPLANT KIDNEY RECIPIENT  DONOR N/A 8/3/2019    Procedure: TRANSPLANT, KIDNEY, RECIPIENT,  DONOR with Ureteral Stent Placement;  Surgeon: Dorian Johnson MD;  Location: [facility] OR     Meds:  Medications Prior to Admission   Medication Sig Dispense Refill Last Dose     acetaminophen (TYLENOL) 325 MG tablet Take 2 tablets (650 mg) by mouth every 4 hours as needed for mild pain 100 tablet 3      aspirin (ASA) 81 MG chewable tablet Take 1 tablet (81 mg) by mouth daily 90 tablet 3      azaTHIOprine (IMURAN) 50 MG tablet Take 3 tablets (150 mg) by mouth daily (Patient taking differently: Take 150 mg by mouth every evening) 270 tablet 3      calcium carbonate 1250 MG/5ML SUSP suspension Take 2.5 mLs (625 mg) by mouth daily (Patient taking differently: Take 625 mg by mouth as needed for heartburn) 500 mL 1      cephALEXin (KEFLEX) 500 MG capsule Take 1 capsule (500 mg) by mouth daily (Patient taking differently: Take 500 mg by mouth every evening) 90 capsule 1      cyanocobalamin (VITAMIN B-12) 1000 MCG tablet Take 1 tablet (1,000 mcg) by mouth daily (Patient taking differently: Take 1,000 mcg by mouth every morning) 90 tablet 1      famotidine (PEPCID) 20 MG tablet Take 1 tablet (20 mg) by mouth daily as needed (dyspepsia) 180 tablet 3      hydrocortisone 2.5 % cream Apply topically 2 times daily (Patient  taking differently: Apply topically as needed) 30 g 3      hydrOXYzine (ATARAX) 10 MG tablet Take 1 tablet (10 mg) by mouth 3 times daily as needed for itching (Patient taking differently: Take 10 mg by mouth as needed for itching) 30 tablet 1      lactase (LACTAID) 3000 UNIT tablet Take 3,000 Units by mouth 3 times daily (with meals)        levothyroxine (SYNTHROID/LEVOTHROID) 50 MCG tablet Take 1 tablet (50 mcg) by mouth daily (Patient taking differently: Take 50 mcg by mouth every morning) 90 tablet 1      magnesium oxide (MAG-OX) 400 MG tablet Take 800 mg by mouth every morning 90 tablet 1      patiromer (VELTASSA) 8.4 g packet Take 8.4 g by mouth daily as needed (as directed by your transplant team, for potassium = or > 5.5) 5 packet 1      polyethylene glycol (MIRALAX) 17 GM/Dose powder Take 17 g (1 capful) by mouth daily as needed for constipation 507 g 3      Prenatal Vit-Fe Fumarate-FA (PRENATAL MULTIVITAMIN W/IRON) 27-0.8 MG tablet Take 1 tablet by mouth every evening 90 tablet 3      simethicone (MYLICON) 125 MG chewable tablet Take 1 tablet (125 mg) by mouth 4 times daily as needed for intestinal gas (Patient taking differently: Take 125 mg by mouth as needed for intestinal gas) 120 tablet 3      sodium bicarbonate 650 MG tablet Take 2 tablets (1,300 mg) by mouth 3 times daily for 90 days 540 tablet 3      tacrolimus (GENERIC EQUIVALENT) 0.5 MG capsule Take 1 capsule (0.5 mg) by mouth 2 times daily Total dose = 5.5mg twice daily. 60 capsule 11      tacrolimus (GENERIC EQUIVALENT) 1 MG capsule Take 2 capsules (2 mg) by mouth 2 times daily HOLD for dose change. 360 capsule 3      tacrolimus (GENERIC EQUIVALENT) 5 MG capsule Take 1 capsule (5 mg) by mouth 2 times daily Total dose = 5.5mg twice daily 60 capsule 11      vitamin D3 (CHOLECALCIFEROL) 50 mcg (2000 units) tablet Take 1 tablet (50 mcg) by mouth daily for 90 days (Patient taking differently: Take 1 tablet by mouth every morning) 90 tablet 3       Allergies:    Allergies   Allergen Reactions     Contrast Dye Rash     CT contrast allergy 12/14/19 rash over eyes. Need to have pre medication before a CT WITH CONTRAST      Diatrizoate Rash     CT contrast allergy 12/14/19 rash over eyes. Need to have pre medication before a CT WITH CONTRAST      Lisinopril Swelling     angioedema     Nitrous Oxide Other (See Comments)     Sense of doom     Chlorhexidine Rash     Rash at site     Sulfa Antibiotics Rash     Muscle stiffness of neck     Dapsone      Methemoglobinemia     Furosemide Other (See Comments)     Skin flushing     Metrogel [Metronidazole]      Hives diffusely on body after vaginal administration.     Azithromycin Dizziness and Rash     Cefuroxime Rash      FmHx:   Family History   Problem Relation Age of Onset     Kidney Disease Mother      Kidney failure Mother      Coronary Artery Disease Father      Heart Disease Father      Sleep Apnea Father      Neurologic Disorder Father         corticobasal degeneration     Parkinsonism Father      Hypertension Sister      Diabetes Sister      Cerebrovascular Disease Sister      Kidney Disease Sister      Breast Cancer No family hx of      Cancer - colorectal No family hx of      Ovarian Cancer No family hx of      Prostate Cancer No family hx of      Other Cancer No family hx of      Asthma No family hx of      Anesthesia Reaction No family hx of      Deep Vein Thrombosis (DVT) No family hx of      Melanoma No family hx of      Skin Cancer No family hx of      Thrombosis No family hx of      SocHx: She reports no tobacco, alcohol, or other drug use during this pregnancy.    ROS:   Complete 10-point ROS negative except as noted in HPI.She denies headache, blurry vision, chest pain, shortness of breath, RUQ pain, nausea, vomiting, dysuria, hematuria or extremity edema. She reports increased itching on abdomen especially at night.    PE:  Vit: Patient Vitals for the past 4 hrs:   BP Temp Temp src Resp   08/03/23  1720 114/66 98.6  F (37  C) Oral 18      Gen: Well-appearing, NAD, comfortable   CV: rrr, no mrg   Pulm: Ctab, no wheezes or crackles   Abd: Soft, gravid, non-tender, +BS   Ext: no LE edema b/l  Cx: deferred                FHT: Baseline 135, mode variability, pos accelerations reactive, no decelerations   [place]: < 1 contractions in 10 minutes      Assessment  Ms. Mona Wagenr is a 32 year old , at 32w6d by LMP confirmed by first trimester US, who presents with concerns for early onset recurrent pyleonephritis and worsening renal function based on BUN and Cr increasing. I suspect she is dehydrated and has prerenal azotemia and with rehydration her BUN/CR will return to baseline.    Plan  - Labor-no evidence of  labor         -will observe for evidence of  labor   -no BTMS at this time  -Fetal Well Being   - Category 1 FHT. Reactive and reassuring   - Cephalic by BSUS. EFW as above   - Continue EFM and [place]-intermittent monitoring TID  -Possible early pyelonephritis versus contamination of nephrostomy tube   -start Ceftazidime per ID curbside while awaiting sensitivity   -adjust dose secondary to decreased GFR     Joseluis Zabala MD   MFM Attending  8/3/2023  -spent 60 minutes in chart review, documentation, counseling patient, examination and care coordination.

## 2023-08-03 NOTE — TELEPHONE ENCOUNTER
Mona's transplant coordinator called in and is concerned about her creatinine increase from 1.3-1.6 and she has a UTI. Writer called Mona and Mona complains of pain at nephrostomy tube site, no fever temp 97.6. Pt complains of ctx every 5-6 minutes since last night and pelvic pressure. Pt still having itching as well and transplant is requesting that pt be seen.   Writer spoke with L&D charge and MFM Dr. Montiel and Dr. Montiel and pt will come in for evaluation. Pt is going to call her  for a ride to the hospital. Delicia Parker RN

## 2023-08-04 ENCOUNTER — DOCUMENTATION ONLY (OUTPATIENT)
Dept: MATERNAL FETAL MEDICINE | Facility: CLINIC | Age: 33
End: 2023-08-04

## 2023-08-04 ENCOUNTER — APPOINTMENT (OUTPATIENT)
Dept: ULTRASOUND IMAGING | Facility: CLINIC | Age: 33
End: 2023-08-04
Attending: INTERNAL MEDICINE
Payer: MEDICARE

## 2023-08-04 LAB
ALBUMIN MFR UR ELPH: 24.6 MG/DL
ALT SERPL W P-5'-P-CCNC: 40 U/L (ref 0–50)
ANION GAP SERPL CALCULATED.3IONS-SCNC: 13 MMOL/L (ref 7–15)
ANION GAP SERPL CALCULATED.3IONS-SCNC: 15 MMOL/L (ref 7–15)
ANION GAP SERPL CALCULATED.3IONS-SCNC: 19 MMOL/L (ref 7–15)
AST SERPL W P-5'-P-CCNC: 42 U/L (ref 0–45)
BACTERIA UR CULT: ABNORMAL
BILE AC SERPL-SCNC: 47 UMOL/L
BUN SERPL-MCNC: 33.6 MG/DL (ref 6–20)
BUN SERPL-MCNC: 34.8 MG/DL (ref 6–20)
BUN SERPL-MCNC: 35.4 MG/DL (ref 6–20)
C TRACH DNA SPEC QL PROBE+SIG AMP: NEGATIVE
CALCIUM SERPL-MCNC: 8.9 MG/DL (ref 8.6–10)
CALCIUM SERPL-MCNC: 9.2 MG/DL (ref 8.6–10)
CALCIUM SERPL-MCNC: 9.4 MG/DL (ref 8.6–10)
CHLORIDE SERPL-SCNC: 100 MMOL/L (ref 98–107)
CHLORIDE SERPL-SCNC: 100 MMOL/L (ref 98–107)
CHLORIDE SERPL-SCNC: 105 MMOL/L (ref 98–107)
CLUE CELLS: ABNORMAL
CREAT SERPL-MCNC: 1.53 MG/DL (ref 0.51–0.95)
CREAT SERPL-MCNC: 1.61 MG/DL (ref 0.51–0.95)
CREAT SERPL-MCNC: 1.63 MG/DL (ref 0.51–0.95)
CREAT SERPL-MCNC: 1.68 MG/DL (ref 0.51–0.95)
CREAT UR-MCNC: 30 MG/DL
CRYSTALS AMN MICRO: NORMAL
DEPRECATED HCO3 PLAS-SCNC: 15 MMOL/L (ref 22–29)
DEPRECATED HCO3 PLAS-SCNC: 17 MMOL/L (ref 22–29)
DEPRECATED HCO3 PLAS-SCNC: 19 MMOL/L (ref 22–29)
ERYTHROCYTE [DISTWIDTH] IN BLOOD BY AUTOMATED COUNT: 14.7 % (ref 10–15)
GFR SERPL CREATININE-BSD FRML MDRD: 42 ML/MIN/1.73M2
GFR SERPL CREATININE-BSD FRML MDRD: 43 ML/MIN/1.73M2
GFR SERPL CREATININE-BSD FRML MDRD: 44 ML/MIN/1.73M2
GFR SERPL CREATININE-BSD FRML MDRD: 46 ML/MIN/1.73M2
GLUCOSE SERPL-MCNC: 129 MG/DL (ref 70–99)
GLUCOSE SERPL-MCNC: 222 MG/DL (ref 70–99)
GLUCOSE SERPL-MCNC: 86 MG/DL (ref 70–99)
HCT VFR BLD AUTO: 34.7 % (ref 35–47)
HGB BLD-MCNC: 11.3 G/DL (ref 11.7–15.7)
HOLD SPECIMEN: NORMAL
MCH RBC QN AUTO: 32.1 PG (ref 26.5–33)
MCHC RBC AUTO-ENTMCNC: 32.6 G/DL (ref 31.5–36.5)
MCV RBC AUTO: 99 FL (ref 78–100)
N GONORRHOEA DNA SPEC QL NAA+PROBE: NEGATIVE
PLATELET # BLD AUTO: 241 10E3/UL (ref 150–450)
POTASSIUM SERPL-SCNC: 4.1 MMOL/L (ref 3.4–5.3)
POTASSIUM SERPL-SCNC: 5.5 MMOL/L (ref 3.4–5.3)
POTASSIUM SERPL-SCNC: 6.1 MMOL/L (ref 3.4–5.3)
PROT/CREAT 24H UR: 0.82 MG/MG CR (ref 0–0.2)
RBC # BLD AUTO: 3.52 10E6/UL (ref 3.8–5.2)
RUPTURE OF FETAL MEMBRANES BY ROM PLUS: NEGATIVE
SODIUM SERPL-SCNC: 132 MMOL/L (ref 136–145)
SODIUM SERPL-SCNC: 134 MMOL/L (ref 136–145)
SODIUM SERPL-SCNC: 137 MMOL/L (ref 136–145)
TRICHOMONAS, WET PREP: ABNORMAL
TROPONIN T SERPL HS-MCNC: 11 NG/L
WBC # BLD AUTO: 9.3 10E3/UL (ref 4–11)
WBC'S/HIGH POWER FIELD, WET PREP: ABNORMAL
YEAST, WET PREP: ABNORMAL

## 2023-08-04 PROCEDURE — 87491 CHLMYD TRACH DNA AMP PROBE: CPT

## 2023-08-04 PROCEDURE — 85027 COMPLETE CBC AUTOMATED: CPT

## 2023-08-04 PROCEDURE — 87210 SMEAR WET MOUNT SALINE/INK: CPT

## 2023-08-04 PROCEDURE — 250N000009 HC RX 250: Performed by: INTERNAL MEDICINE

## 2023-08-04 PROCEDURE — 250N000012 HC RX MED GY IP 250 OP 636 PS 637

## 2023-08-04 PROCEDURE — 82565 ASSAY OF CREATININE: CPT | Performed by: OBSTETRICS & GYNECOLOGY

## 2023-08-04 PROCEDURE — 87591 N.GONORRHOEAE DNA AMP PROB: CPT

## 2023-08-04 PROCEDURE — 250N000013 HC RX MED GY IP 250 OP 250 PS 637: Performed by: OBSTETRICS & GYNECOLOGY

## 2023-08-04 PROCEDURE — 250N000013 HC RX MED GY IP 250 OP 250 PS 637

## 2023-08-04 PROCEDURE — 36415 COLL VENOUS BLD VENIPUNCTURE: CPT

## 2023-08-04 PROCEDURE — 99223 1ST HOSP IP/OBS HIGH 75: CPT | Performed by: INTERNAL MEDICINE

## 2023-08-04 PROCEDURE — 250N000011 HC RX IP 250 OP 636

## 2023-08-04 PROCEDURE — 250N000011 HC RX IP 250 OP 636: Mod: JZ | Performed by: SURGERY

## 2023-08-04 PROCEDURE — 59025 FETAL NON-STRESS TEST: CPT | Mod: 26 | Performed by: OBSTETRICS & GYNECOLOGY

## 2023-08-04 PROCEDURE — 99233 SBSQ HOSP IP/OBS HIGH 50: CPT | Mod: 25 | Performed by: OBSTETRICS & GYNECOLOGY

## 2023-08-04 PROCEDURE — 87653 STREP B DNA AMP PROBE: CPT

## 2023-08-04 PROCEDURE — 84484 ASSAY OF TROPONIN QUANT: CPT

## 2023-08-04 PROCEDURE — 36415 COLL VENOUS BLD VENIPUNCTURE: CPT | Performed by: OBSTETRICS & GYNECOLOGY

## 2023-08-04 PROCEDURE — 84112 EVAL AMNIOTIC FLUID PROTEIN: CPT

## 2023-08-04 PROCEDURE — 76775 US EXAM ABDO BACK WALL LIM: CPT

## 2023-08-04 PROCEDURE — 80048 BASIC METABOLIC PNL TOTAL CA: CPT

## 2023-08-04 PROCEDURE — 76775 US EXAM ABDO BACK WALL LIM: CPT | Mod: 26 | Performed by: RADIOLOGY

## 2023-08-04 PROCEDURE — 93005 ELECTROCARDIOGRAM TRACING: CPT

## 2023-08-04 PROCEDURE — 80048 BASIC METABOLIC PNL TOTAL CA: CPT | Performed by: OBSTETRICS & GYNECOLOGY

## 2023-08-04 PROCEDURE — 84460 ALANINE AMINO (ALT) (SGPT): CPT

## 2023-08-04 PROCEDURE — 120N000002 HC R&B MED SURG/OB UMMC

## 2023-08-04 PROCEDURE — 258N000002 HC RX IP 258 OP 250: Performed by: INTERNAL MEDICINE

## 2023-08-04 PROCEDURE — 93010 ELECTROCARDIOGRAM REPORT: CPT | Performed by: INTERNAL MEDICINE

## 2023-08-04 PROCEDURE — 84156 ASSAY OF PROTEIN URINE: CPT

## 2023-08-04 PROCEDURE — 84450 TRANSFERASE (AST) (SGOT): CPT

## 2023-08-04 RX ORDER — BETAMETHASONE SODIUM PHOSPHATE AND BETAMETHASONE ACETATE 3; 3 MG/ML; MG/ML
12 INJECTION, SUSPENSION INTRA-ARTICULAR; INTRALESIONAL; INTRAMUSCULAR; SOFT TISSUE EVERY 24 HOURS
Status: COMPLETED | OUTPATIENT
Start: 2023-08-04 | End: 2023-08-05

## 2023-08-04 RX ORDER — NIFEDIPINE 10 MG/1
20 CAPSULE ORAL EVERY 6 HOURS
Status: DISCONTINUED | OUTPATIENT
Start: 2023-08-04 | End: 2023-08-06

## 2023-08-04 RX ORDER — URSODIOL 300 MG/1
300 CAPSULE ORAL 3 TIMES DAILY
Status: DISCONTINUED | OUTPATIENT
Start: 2023-08-04 | End: 2023-08-12

## 2023-08-04 RX ORDER — NIFEDIPINE 10 MG/1
10 CAPSULE ORAL ONCE
Status: COMPLETED | OUTPATIENT
Start: 2023-08-04 | End: 2023-08-04

## 2023-08-04 RX ORDER — POLYETHYLENE GLYCOL 3350 17 G/17G
17 POWDER, FOR SOLUTION ORAL DAILY PRN
Status: DISCONTINUED | OUTPATIENT
Start: 2023-08-04 | End: 2023-08-12

## 2023-08-04 RX ORDER — MAGNESIUM OXIDE 400 MG/1
800 TABLET ORAL EVERY MORNING
Status: DISCONTINUED | OUTPATIENT
Start: 2023-08-05 | End: 2023-08-15 | Stop reason: HOSPADM

## 2023-08-04 RX ADMIN — Medication 50 MCG: at 08:28

## 2023-08-04 RX ADMIN — SODIUM BICARBONATE 650 MG TABLET 1300 MG: at 08:28

## 2023-08-04 RX ADMIN — LEVOTHYROXINE SODIUM 50 MCG: 25 TABLET ORAL at 08:27

## 2023-08-04 RX ADMIN — SODIUM ZIRCONIUM CYCLOSILICATE 15 G: 5 POWDER, FOR SUSPENSION ORAL at 15:31

## 2023-08-04 RX ADMIN — NIFEDIPINE 10 MG: 10 CAPSULE ORAL at 10:47

## 2023-08-04 RX ADMIN — SODIUM BICARBONATE: 84 INJECTION, SOLUTION INTRAVENOUS at 16:19

## 2023-08-04 RX ADMIN — SODIUM ZIRCONIUM CYCLOSILICATE 15 G: 5 POWDER, FOR SUSPENSION ORAL at 13:56

## 2023-08-04 RX ADMIN — PRENATAL VITAMINS-IRON FUMARATE 27 MG IRON-FOLIC ACID 0.8 MG TABLET 1 TABLET: at 19:48

## 2023-08-04 RX ADMIN — CEFTAZIDIME 1 G: 1 INJECTION, POWDER, FOR SOLUTION INTRAMUSCULAR; INTRAVENOUS at 08:40

## 2023-08-04 RX ADMIN — ACETAMINOPHEN 650 MG: 325 TABLET, FILM COATED ORAL at 08:38

## 2023-08-04 RX ADMIN — BETAMETHASONE SODIUM PHOSPHATE AND BETAMETHASONE ACETATE 12 MG: 3; 3 INJECTION, SUSPENSION INTRA-ARTICULAR; INTRALESIONAL; INTRAMUSCULAR at 04:27

## 2023-08-04 RX ADMIN — CYANOCOBALAMIN TAB 1000 MCG 1000 MCG: 1000 TAB at 08:27

## 2023-08-04 RX ADMIN — CEFTAZIDIME 1 G: 1 INJECTION, POWDER, FOR SOLUTION INTRAMUSCULAR; INTRAVENOUS at 19:48

## 2023-08-04 RX ADMIN — SODIUM BICARBONATE 650 MG TABLET 1300 MG: at 13:47

## 2023-08-04 RX ADMIN — ACETAMINOPHEN 650 MG: 325 TABLET, FILM COATED ORAL at 12:51

## 2023-08-04 RX ADMIN — AZATHIOPRINE 150 MG: 50 TABLET ORAL at 21:50

## 2023-08-04 RX ADMIN — ACETAMINOPHEN 650 MG: 325 TABLET, FILM COATED ORAL at 03:54

## 2023-08-04 RX ADMIN — NIFEDIPINE 10 MG: 10 CAPSULE ORAL at 08:37

## 2023-08-04 RX ADMIN — URSODIOL 300 MG: 300 CAPSULE ORAL at 16:32

## 2023-08-04 RX ADMIN — PATIROMER 8.4 G: 8.4 POWDER, FOR SUSPENSION ORAL at 09:32

## 2023-08-04 RX ADMIN — NIFEDIPINE 20 MG: 10 CAPSULE ORAL at 19:46

## 2023-08-04 RX ADMIN — TACROLIMUS 5 MG: 5 CAPSULE ORAL at 21:50

## 2023-08-04 RX ADMIN — NIFEDIPINE 10 MG: 10 CAPSULE ORAL at 08:39

## 2023-08-04 RX ADMIN — SODIUM BICARBONATE 650 MG TABLET 1300 MG: at 21:44

## 2023-08-04 RX ADMIN — NIFEDIPINE 20 MG: 10 CAPSULE ORAL at 13:46

## 2023-08-04 RX ADMIN — URSODIOL 300 MG: 300 CAPSULE ORAL at 21:45

## 2023-08-04 RX ADMIN — ASPIRIN 81 MG CHEWABLE TABLET 81 MG: 81 TABLET CHEWABLE at 08:27

## 2023-08-04 RX ADMIN — TACROLIMUS 5 MG: 5 CAPSULE ORAL at 11:25

## 2023-08-04 ASSESSMENT — ACTIVITIES OF DAILY LIVING (ADL)
ADLS_ACUITY_SCORE: 31

## 2023-08-04 NOTE — PROVIDER NOTIFICATION
08/04/23 1630   Provider Notification   Provider Name/Title Dr Dobbs and Dr Zabala   Method of Notification At Bedside   Notification Reason Status Update     MFM team at bedside to review plan of care.

## 2023-08-04 NOTE — PROGRESS NOTES
Notified Dr. Dobbs of bile acids 47. Will relay to Dr. Zabala and discuss plan for medication and BPP early next week either in house or in clinic if discharged prior to Monday.

## 2023-08-04 NOTE — CONSULTS
[facility]  Transplant Nephrology Consult  Date of Admission:  8/3/2023  Today's Date: 08/04/2023  Requesting physician: Joseluis Zabala MD    Recommendations:  - sodium bicarb in half normal saline (ordered for you) 100 ml/hr for 1 L   - kidney transplant US (ordered for you)  - lokelma 30g once today   - repeat labs in 6 hours of lokelma   - agree with IV ceftazidime for her UTI    Assessment & Plan   # DDKT: Trend up   - Baseline Creatinine: ~ 1.2-1.4 with pregnancy, Pre-pregnancy was 0.8-1.1    - Proteinuria: Mild (0.5-1.0 grams), stated to trend up since July 2023   - Date DSA Last Checked: -Sep/2022      Latest DSA: No, cPRA : 25%   - BK Viremia: No   - Kidney Tx Biopsy: Sep 17, 2019; Result: No diagnostic evidence of acute rejection.     CAMERON likely 2/2 UTI   Pt with baseline creatinine was 1.1 started to trend up to 1.3, 1.4 in July but since yesterday her creatinine was at 1.6 with hyperkalemia. UA was abnormal and culture growing Stenotrophomonas maltophila and being treated for it with ceftazidime IV. Also pre-renal injury in setting of poor oral fluid intake because of ongoing uterine contractions. Unclear if she is having any new obstruction (resolved after PNT placement).  - will get transplant + bladder scan   - will involve IR if there is any obstruction     # Immunosuppression: Tacrolimus immediate release (goal 4-6) and Azathioprine (dose 150 mg daily)   - Patient is in an immunosuppressed state and will continue to monitor for efficacy and toxicity of immunosuppression medications.   - Changes: Not at this time, repeat tac AM    # Infection Prophylaxis:    Last CD4 Level: 201 (Jul/2020)   - PJP: None    # Hypertension: Controlled;  Goal BP: < 130/80   - Volume status: Euvolemic, pt was on florinef prior to pregnancy, encouraged good hydration   - Changes: No    # Anemia in Chronic Renal Disease: Hgb: Stable, low      CHARLINE: No   - Iron studies:  Replete    # Mineral Bone Disorder:   - Teriary renal hyperparathyroidism :Patient is s/p parathyroidectomy 4/2023 with 3.5 glands removed ; PTH level:  low <15         On treatment: None  - Vitamin D; level: Low        On supplement: Yes  - Calcium; level: Normal        On supplement: No    # Electrolytes:   - Potassium; level: High        On supplement: No  - Bicarbonate; level: Low        On supplement: Yes  - Sodium; level: Low    Hyperkalemia, noted likely 2/2 CAMERON, ? Obstruction and or being on tac. [place] given this morning, but repeat was 6.1. so giving her total dose of 30g of lokelma now and repeat in 6 hours.  If persistently high, will likely try lasix with fluids prior.  - transplant renal US as mentioned above.    # Hyponatremia:               -Slightly low serum sodium level. Stable    # Ureteral Stenosis/Hydronephrosis: Patient with moderate hydronephrosis of kidney transplant and developed CAMERON.  She underwent PNT 6/9/23 with repeat kidney transplant ultrasound 6/11 still showing moderate hydronephrosis suggesting this may be a functional hydronephrosis from pregnancy.  However, patient does report resolution of edema and dyspnea, as well as apparent post-obstructive diuresis.  She has a previous h/o ureteral stenosis with ureteral stent removed 2/2021.  Previous kidney transplant ultrasound 7/2021 also showed moderate hydronephrosis unchanged with voiding. Hydronephrosis was unchanged on 7/6/22 abdominal CT.                -Most recent U/S 7/5/23 with improvement in hydronephrosis with PNT in place     # E. Coli bacteremia:              -Admitted 7/3/23 for E.coli bacteremia. S/p treatment with IV ceftriaxone x7d              -Now on keflex for secondary ppx until post partum     # Pregnancy: Patient is 31 weeks 3 days pregnant. Followed by MFM.     # GERD: Controlled on H2 blocler.     # Hyperprolactinemia: Normal prolactin level with last check.  Felt secondary to hypothyroidism versus kidney  failure, or less likely now a pituitary microadenoma.  Followed by Endocrinology.     # Right Axillary Lymph Node: Patient was referred to general surgery for excisional biopsy in 8/2022, but decision was made to watch the node.  Node appears to be stable for the last year at least.              - Would consider excisional biopsy post partum.     # Skin Cancer Risk:               - Discussed sun protection and recommend regular follow up with Dermatology.     # Medical Compliance: Yes    # Transplant History:  Etiology of Kidney Failure: Unknown etiology (no kidney biopsy)  Tx: DDKT  Transplant: 8/3/2019 (Kidney)  Significant changes in immunosuppression:  changed from mycophenolate to Azathioprine for pregnancy.  Significant transplant-related complications: Transplant ureteral stenosis    Recommendations were communicated to the primary team verbally and via this note    Amarilys Christina MD  Pager: [phone]    REASON FOR CONSULT   CAMERON on transplant kidney   Hyperkalemia   Metabolic acidosis    History of Present Illness   Mona Wagner is a 32 year old female past medical history of ESKD of unclear etiology, status post DDKT in 2019, CKD, high risk pregnancy admitted for increased contractions.  On admission noted to have creatinine elevation from baseline of 1.3-1 0.4 to 1.6 and also noted to have hyperkalemia at potassium of 5.5, repeat was 6.1.  There is associated metabolic acidosis as well.  Patient endorsed having pain in her right lower quadrant that is radiating into her groin and contractions at the same time. UA was dirty and culture was positive. Denied other significant GI issues lately. Was able to tolerate PO but pain made her not to be hydrated well encough in the past 24 to 48 hours. Endorses compliant with her medications.    Review of Systems    The 10 point Review of Systems is negative other than noted in the HPI or here.     Past Medical History    I have reviewed this patient's medical history  and updated it with pertinent information if needed.   Past Medical History:   Diagnosis Date    Anemia in chronic kidney disease     Bacteremia 07/05/2023    Difficult intubation     see 4/28/23 anesthesia notes    History of bacterial endocarditis     History of blood transfusion     History of DVT (deep vein thrombosis) 2014    History of seizure 2014    History of subarachnoid hemorrhage     Hydronephrosis     Hypothyroidism     Kidney transplanted 08/03/2019    DCD DDKT. Induction with thymo 6mg/kg.    Orthostatic hypotension     FATOUMATA (obstructive sleep apnea)     Pyelonephritis of transplanted kidney 01/23/2020    Secondary renal hyperparathyroidism (H)     Urinary tract infection        Past Surgical History   I have reviewed this patient's surgical history and updated it with pertinent information if needed.  Past Surgical History:   Procedure Laterality Date    BENCH KIDNEY N/A 8/3/2019    Procedure: BACKBENCH PREPARATION, ALLOGRAFT, KIDNEY;  Surgeon: Dorian Johnson MD;  Location: [facility] OR    COMBINED CYSTOSCOPY, RETROGRADES, EXCHANGE STENT URETER(S) Right 11/23/2020    Procedure: CYSTOSCOPY, CYSOTGRAM, WITH RETROGRADE PYELOGRAM, ureteroscopy,  AND URETERAL STENT;  Surgeon: Wally Britt MD;  Location: [facility] OR    COMBINED CYSTOSCOPY, RETROGRADES, URETEROSCOPY, LASER HOLMIUM LITHOTRIPSY URETER(S), INSERT STENT N/A 12/6/2019    Procedure: CYSTOURETEROSCOPY, WITH RETROGRADE PYELOGRAM of transplant kidney, STENT INSERTION, Laser on standby;  Surgeon: Wally Britt MD;  Location: [facility] OR    CREATE FISTULA ARTERIOVENOUS UPPER EXTREMITY  1/2/2014    Procedure: CREATE FISTULA ARTERIOVENOUS UPPER EXTREMITY;  Left Wrist Arteriovenous Fistula Placement;  Surgeon: Shashi Castro MD;  Location: [facility] OR    CREATE FISTULA ARTERIOVENOUS UPPER EXTREMITY      CYSTOSCOPY, RETROGRADES, INSERT STENT URETER(S), COMBINED N/A 8/20/2020    Procedure: CYSTOSCOPY, WITH RETROGRADE PYELOGRAM, CYSTOGRAM AND URETERAL STENT  INSERTION - TRANSPLANT KIDNEY;  Surgeon: Wally Britt MD;  Location: [facility] OR    IR CEREBRAL ANGIOGRAM  2014    IR NEPHROSTOMY TUBE PLACEMENT RIGHT  2023    PARATHYROIDECTOMY Bilateral 2023    Procedure: Bilateral Resection of 3 and 1/2 parathyroid glands;  Surgeon: Melissa Jones MD;  Location: [facility] OR    PERCUTANEOUS BIOPSY KIDNEY Right 2019    Procedure: Right Kidney Biopsy;  Surgeon: Deny Rios MD;  Location: [facility]    RASTA/DIALYSIS CATHETER  12/10/2013         TRANSPLANT KIDNEY RECIPIENT  DONOR N/A 8/3/2019    Procedure: TRANSPLANT, KIDNEY, RECIPIENT,  DONOR with Ureteral Stent Placement;  Surgeon: Dorian Johnson MD;  Location: [facility] OR       Family History   I have reviewed this patient's family history and updated it with pertinent information if needed.   Family History   Problem Relation Age of Onset    Kidney Disease Mother     Kidney failure Mother     Coronary Artery Disease Father     Heart Disease Father     Sleep Apnea Father     Neurologic Disorder Father         corticobasal degeneration    Parkinsonism Father     Hypertension Sister     Diabetes Sister     Cerebrovascular Disease Sister     Kidney Disease Sister     Breast Cancer No family hx of     Cancer - colorectal No family hx of     Ovarian Cancer No family hx of     Prostate Cancer No family hx of     Other Cancer No family hx of     Asthma No family hx of     Anesthesia Reaction No family hx of     Deep Vein Thrombosis (DVT) No family hx of     Melanoma No family hx of     Skin Cancer No family hx of     Thrombosis No family hx of        Social History   I have reviewed this patient's social history and updated it with pertinent information if needed. Mona Wagner  reports that she has never smoked. She has never used smokeless tobacco. She reports that she does not drink alcohol and does not use drugs.    Allergies   Allergies   Allergen Reactions    Contrast Dye Rash     CT contrast  allergy 19 rash over eyes. Need to have pre medication before a CT WITH CONTRAST     Diatrizoate Rash     CT contrast allergy 19 rash over eyes. Need to have pre medication before a CT WITH CONTRAST     Lisinopril Swelling     angioedema    Nitrous Oxide Other (See Comments)     Sense of doom    Chlorhexidine Rash     Rash at site    Sulfa Antibiotics Rash     Muscle stiffness of neck    Dapsone      Methemoglobinemia    Furosemide Other (See Comments)     Skin flushing    Metrogel [Metronidazole]      Hives diffusely on body after vaginal administration.    Azithromycin Dizziness and Rash    Cefuroxime Rash     Prior to Admission Medications    aspirin  81 mg Oral Daily    azaTHIOprine  150 mg Oral QPM    betamethasone acet & sod phos  12 mg Intramuscular Q24H    cefTAZidime  1 g Intravenous Q12H    cyanocobalamin  1,000 mcg Oral QAM    levothyroxine  50 mcg Oral Daily    NIFEdipine  20 mg Oral Q6H    prenatal multivitamin w/iron  1 tablet Oral QPM    senna-docusate  1 tablet Oral BID    Or    senna-docusate  2 tablet Oral BID    sodium bicarbonate  1,300 mg Oral TID    sodium zirconium cyclosilicate  15 g Oral Once    tacrolimus  5 mg Oral BID    vitamin D3  50 mcg Oral QAM      - MEDICATION INSTRUCTIONS -         Physical Exam   Temp  Av.6  F (37  C)  Min: 97.6  F (36.4  C)  Max: 100.7  F (38.2  C)      Pulse  Av.2  Min: 67  Max: 105 Resp  Av.9  Min: 14  Max: 20  SpO2  Av.5 %  Min: 98 %  Max: 99 %     /65   Pulse 85   Temp 98.2  F (36.8  C) (Oral)   Resp 18   Ht 1.524 m (5')   Wt 79.7 kg (175 lb 11.2 oz)   [facility] 2022   SpO2 99%   BMI 34.31 kg/m     Date 23 07 - 23 0659   Shift 8352-4784 6652-8596 5162-6374 24 Hour Total   INTAKE   P.O. 600   600   Shift Total(mL/kg) 600(7.53)   600(7.53)   OUTPUT   Urine 850   850   Shift Total(mL/kg) 850(10.67)   850(10.67)   Weight (kg) 79.7 79.7 79.7 79.7      Admit Weight: 79.7 kg (175 lb 11.2 oz)     GENERAL  APPEARANCE: alert and in distress with contractions  HENT: no gross abnormalities noted  LYMPHATICS: no cervical or supraclavicular nodes  RESP: lungs clear to auscultation - no rales, rhonchi or wheezes  CV: regular rhythm, normal rate, no rub, no murmur  EDEMA: no LE edema bilaterally  ABDOMEN: soft, protubarant because of pregnancy, no increased tenderness on transplanted kidney  MS: extremities normal - no gross deformities noted, no evidence of inflammation in joints, no muscle tenderness  SKIN: rash noted on skin near to her percutaneous nephrostomy tube   NEURO: mentation intact and speech normal  PSYCH: mentation appears normal and affect normal/bright    Data   CMP  Recent Labs   Lab 08/04/23  1310 08/04/23  0706 08/03/23  1804 08/03/23  1014 07/31/23  1017   * 137 136 136 136   POTASSIUM 6.1* 5.5* 5.3 5.2 4.9   CHLORIDE 100 105 103 103 104   CO2 15* 17* 21* 21* 17*   ANIONGAP 19* 15 12 12 15   * 86 107* 80 85   BUN 34.8* 35.4* 34.8* 35.4* 29.6*   CR 1.53* 1.63*  1.61* 1.67* 1.64* 1.33*   GFRESTIMATED 46* 43*  44* 42* 43* 55*   SHAMIR 9.2 9.4 9.4 9.6 9.9   PROTTOTAL  --   --  7.1 7.3 7.5   ALBUMIN  --   --  3.5 3.7 3.8   BILITOTAL  --   --  1.4* 1.5* 1.4*   ALKPHOS  --   --  149* 151* 139*   AST  --  42 32 34 35   ALT  --  40 36 37 29     CBC  Recent Labs   Lab 08/04/23  0706 08/03/23  1804 08/03/23  1014 07/31/23  1017   HGB 11.3* 10.6* 11.0* 10.1*   WBC 9.3 7.9 8.4 8.7   RBC 3.52* 3.43* 3.58* 3.26*   HCT 34.7* 34.2* 34.5* 32.1*   MCV 99 100 96 99   MCH 32.1 30.9 30.7 31.0   MCHC 32.6 31.0* 31.9 31.5   RDW 14.7 14.6 14.6 14.6    261 253 234     INRNo lab results found in last 7 days.  ABGNo lab results found in last 7 days.   Urine Studies  Recent Labs   Lab Test 07/02/23  1916 06/30/23  1116 06/26/23  1037 06/11/23  1532 06/22/22  0811 06/15/22  1145 02/26/21  1210 02/26/21  1143   COLOR Yellow Light Yellow Light Yellow Straw   < > Yellow   < > Yellow   APPEARANCE Slightly Cloudy*  Slightly Cloudy* Clear Clear   < > Clear   < > Clear   URINEGLC Negative Negative Negative Negative   < > Negative   < > Negative   URINEBILI Negative Negative Negative Negative   < > Negative   < > Negative   URINEKETONE Negative Negative Negative Negative   < > Negative   < > Negative   SG 1.010 1.003 1.008 1.006   < > 1.010   < > 1.020   UBLD Moderate* Negative Negative Large*   < > Negative   < > Large*   URINEPH 5.5 6.5 7.0 7.5*   < > 6.0   < > 6.5   PROTEIN 50* Negative 20* 30*   < > Negative   < > 100*   UROBILINOGEN  --   --   --   --   --  0.2  --  0.2   NITRITE Positive* Negative Negative Negative   < > Negative   < > Negative   LEUKEST Large* Moderate* Negative Small*   < > Negative   < > Small*   RBCU 2 <1 <1 151*   < > None Seen   < >  --    WBCU 13* 6* <1 27*   < > 0-5   < >  --     < > = values in this interval not displayed.     Recent Labs   Lab Test 02/09/21  0922 02/02/21  0920 08/04/20  0905 04/29/20  0850 02/03/20  1035 12/03/19  1215 09/03/19  0940   UTPG 0.28* 0.38* Unable to calculate due to low value Unable to calculate due to low value 0.13 0.15 0.24*     PTH  Recent Labs   Lab Test 04/29/23  0552 04/28/23  1025 02/06/23  1023 01/18/23  0949 04/01/22  1000 03/03/21  0912 08/04/20  0905 12/03/19  1112 08/09/19  0559   PTHI 3* 13* 59 79* 365* 251* 229* 210* 358*     Iron Studies  Recent Labs   Lab Test 07/31/23  1017 05/22/23  1023 05/22/23  1022 04/25/23  1035 07/13/22  1220 08/04/20  0905 02/17/20  0850 12/03/19  1112 09/03/19  0944 08/30/19  1700 08/12/19  0619 01/20/19  0603 12/29/16  1338   IRON 94  --  80 49 51 64 95 47 87 138 96 43 137     --  195* 181* 217* 198* 225* 200* 254 270 162*  --  228*   IRONSAT 35  --  41 27 24 32 42 23 34 51* 59*  --  60*   MONET 1,525* 1,618*  --  1,770* 1,111* 1,065* 1,139* 1,003* 1,535* 1,746*  --  1,780* 1,039*       IMAGING:  All imaging studies reviewed by me.

## 2023-08-04 NOTE — PLAN OF CARE
"Pt reports having some \"tightening\". Encouraged to try to void prior to placing monitors on. Pt reports she was able to void about 50cc. She also states that she has not been able to void since her last hospitalization. Nephrostomy tube draining normally clear yellow urine. States that her tightening has gotten better since voiding. Monitors applied.  Dr Goel updated as well on pt status.     "

## 2023-08-04 NOTE — PROVIDER NOTIFICATION
08/04/23 1420   Provider Notification   Provider Name/Title Dr Dobbs   Method of Notification Phone   Notification Reason SVE;Lab/Diagnostic Study     Plan per Dr Dobbs is to administer another dose of Lokelma, see eMAR, and recheck K+ labs in 6 hrs from now. See provider's notes.

## 2023-08-04 NOTE — PLAN OF CARE
Pt has complained of worsening lower abd pain sometimes radiating to her right side. Has voided with some relief from discomfort. UC's present with pain rating 6/10 at times. SVE 1.5-2/50/-1 by Dr Goel. Labs sent as well. Please see labor record for details. Rescue Beta given at 0430.  Pt denies leaking of fluid or vaginal bleeding. Does have a headache present and taking tylenol for with relief. Denies Visual changes or epigastric pain. Pt currently sleeping in between cares. Encouraged to call with needs.

## 2023-08-04 NOTE — PROVIDER NOTIFICATION
08/04/23 1042   Provider Notification   Provider Name/Title Dr Dobbs   Method of Notification Electronic Page   Request Evaluate in Person   Notification Reason Membrane Status     Pt felt a gush after voiding on the toilet. Pt continues to feel uncomfortable with contractions.

## 2023-08-04 NOTE — PLAN OF CARE
"Late entry due to pt cares.    Pt BP's elevated, HA present, denies visual changes or epigastric pain. Please see labor record for details. Serial BP's obtained. MFM fellow and G3 pager paged with no answer. Charge RN called and updated on pt status/vitals.  Pt continues to have increased pain and frequency with contractions.  Palpate moderate. Pt escorted to BR and while returning to bed, she felt a \"leak\" small trickle of fluid noted down her thigh.  Pad placed.  Dr Dobbs and Dr Zabala at bedside for assessment.  Sterile spec exam done with ROM plus obtained.  See labor record for details.  Pt had been complaining of hand cramping now hand, shoulder and chest pain.  EKG obtained.  Meds given as ordered. Pt continues to have continuous, painful contractions. UA obtained and sent. Report given to ERMELINDA Prajapati. Please refer to labor record for further details.   "

## 2023-08-04 NOTE — PROGRESS NOTES
Antimicrobial Stewardship Team Note    Antimicrobial Stewardship Program - A joint venture between [facility] and  Physicians to optimize antibiotic management.  NOT a formal consult - Restricted Antimicrobial Review     Patient: Mona Wagner  MRN: [id number]  Allergies: Contrast dye, Diatrizoate, Lisinopril, Nitrous oxide, Chlorhexidine, Sulfa antibiotics, Dapsone, Furosemide, Metrogel [metronidazole], Azithromycin, and Cefuroxime    Brief Summary: Mona Wagner is a 32 year old female who is 32 weeks pregnant and a PMHx of s/p kidney transplant 8/2019 with nephrostomy tube, ureteral obstruction, recurrent pyelonephritis, FATOUMATA, hypothyroidism, and a history of bacterial endocarditis 1/2019. She was admitted on 8/3/2023 with worsening kidney function and possible early pyelonephritis.     History of Present Illness: She reported upon admission that she was having increased tenderness near the nephrostomy tube. She denies fever but noted increased evening itching that may be related to dehydration. Has been unable to void throughout pregnancy due to fetus obstruction on transplanted kidney. She reported intermittent contractions with no signs of vaginal bleeding or loss of fluid. Normal fetal movement. Previous hospitalization in early July (7/2-7/9/2023) and was followed by ID for pyelonephritis complicated by Ecoli bacteremia that was treated IV ceftriaxone followed by oral Keflex 500 mg at bedtime for a 90 day supply for secondary prophylaxis for pyelonephritis. She was afebrile and normotensive upon admission. No signs of leukocytosis with a WBC of 7.9 and ANC of 6.3. sCr was elevated at 1.67 from baseline around 1.3. Urine and vaginal cultures were collected. Urine culture shows 50,000-100,000 CFU/mL Stenotrophomonas maltophila. Susceptibilities came back as susceptible to ceftazidime, minocycline, levofloxacin, and Bactrim. Vaginal cultures were unremarkable. Ceftazidime was started on 8/3/2023.        Active Anti-infective Medications   (From admission, onward)                 Start     Stop    23  cefTAZidime  1 g,   Intravenous,   EVERY 12 HOURS        Pyelonephritis       --                  Assessment: Pyelonephritis c/b fetal obstruction of transplanted kidney  Today is day 2 of ceftazidime therapy. This patient is a community-dwelling patient with one (her immunosuppression d/t kidney tx is also a risk factor) risk factor for MRSA, recent hospitalization. She has been taking Keflex as secondary prophylaxis at bedtime for her previous E. coli pyelonephritis. Given her concomitant pregnancy and kidney transplant with a positive urine culture for Stenotrophomonas maltophila, treatment is required to prevent sepsis and/or harm to the fetus. She is susceptible to ceftazidime, minocycline, levofloxacin, and Bactrim. However, given her pregnancy, levofloxacin and minocycline would not be safe for use. Bactrim also has some data to avoid if possible given the anti-folate activity of trimethoprim in the first trimester. A  article in CHEST noted that if Bactrim were to be prescribed, high-dose folic acid (0.4 mg/day) should be prescribed. However, this data is in relation to the first trimester where impact of anti-folate activity is high but benefit even in the third trimester would outweigh any risk. She was taking a pre- vitamin PTA that contained 0.8 mg of folic acid. Additionally, sulfa containing products are generally recommended against during the end of pregnancy due to the potential risk of kernicterus in the  by displacing bilirubin bound to albumin. However, this has never been studied with Bactrim specifically, and may warrant a benefit/risk discussion with the patient. The allergy to sulfa antibiotics does not appear to be a true IgE mediated allergy given the lack of anaphylaxis and more of an adverse effect (noted as neck stiffness).  The 2010 IDSA uncomplicated cystitis  and acute pyelonephritis guidelines and a 2017 article in NEJM recommend a treatment duration of 14 days for pyelonephritis when using Bactrim. Recommend OBGYN and patient discussion on the benefit/risks of IV versus oral therapy. Recommend continuing ceftazidime therapy until discharge, but after careful consideration and discussion with patient could then transition to Bactrim as this is considered first line for Stenotrophomonas and would avoid IV line access for Outpatient Antimicrobial Therapy (OPAT).    Recommendations:  OBGYN and patient discussion on the benefits/risks of IV ceftazidime vs. oral Bactrim therapy  Continue ceftazidime until discharge, then could consider Bactrim 800/160 mg PO 2 times daily to complete a total of 14 days of antibiotics  Continue prenatal vitamin     Pharmacy took the following actions: Called/paged provider, Electronic note created.    Discussed with ID Staff: Janie GONZALEZ, MD, MS and Buffy Jon, PharmD, MPH, BCIDP    Vital Signs/Clinical Features:  Vitals         08/02 0700  08/03 0659 08/03 0700  08/04 0659 08/04 0700  08/04 1351   Most Recent      Temp ( F)   98.1 -  98.6    98.2 -  98.6     98.2 (36.8) 08/04 1342    Pulse   67 -  73    68 -  85     85 08/04 0941    Resp   16 -  18    18 -  20     18 08/04 1342    BP   114/66 -  154/73    122/65 -  188/95     122/65 08/04 1342    SpO2 (%)     99       99 08/03 2000            Labs  Estimated Creatinine Clearance: 47.8 mL/min (A) (based on SCr of 1.61 mg/dL (H)).  Recent Labs   Lab Test 07/24/23  1001 07/27/23  1030 07/31/23  1017 08/03/23  1014 08/03/23  1804 08/04/23  0706   CR 1.42* 1.26* 1.33* 1.64* 1.67* 1.63*  1.61*       Recent Labs   Lab Test 12/16/19  0840 12/20/19  1134 01/23/20  1301 01/24/20  0648 01/25/20  0656 01/26/20  0627 01/27/20  0900 02/02/21  0920 03/03/21  0912 07/24/23  1001 07/27/23  1030 07/31/23  1017 08/03/23  1014 08/03/23  1804 08/04/23  0706   WBC 4.3   < > 11.2* 11.4* 7.5 4.2    < > 6.3   < > 7.3 6.7 8.7 8.4 7.9 9.3   ANEU 3.3  --  9.9* 11.2* 6.2 3.1  --  4.3  --   --   --   --   --   --   --    ALYM 0.4*  --  0.3* 0.1* 0.5* 0.5*  --  1.2  --   --   --   --   --   --   --    YASIR 0.4  --  0.7 0.0 0.7 0.4  --  0.6  --   --   --   --   --   --   --    AEOS 0.2  --  0.0 0.0 0.0 0.0  --  0.1  --   --   --   --   --   --   --    HGB 10.8*   < > 10.4* 8.9* 8.1* 8.2*   < > 12.7   < > 9.9* 9.6* 10.1* 11.0* 10.6* 11.3*   HCT 35.1   < > 33.7* 28.5* 26.2* 26.3*   < > 39.9   < > 32.1* 31.2* 32.1* 34.5* 34.2* 34.7*   MCV 95   < > 94 92 92 91   < > 90   < > 100 99 99 96 100 99      < > 179 128* 107* 118*   < > 237   < > 194 191 234 253 261 241    < > = values in this interval not displayed.       Recent Labs   Lab Test 07/04/23  0817 07/10/23  1028 07/13/23  1015 07/27/23  1030 07/31/23  1017 08/03/23  1014 08/03/23  1804 08/04/23  0706   BILITOTAL 0.5 0.6 0.5 0.9 1.4* 1.5* 1.4*  --    ALKPHOS 96 117* 123*  --  139* 151* 149*  --    ALBUMIN 3.3* 3.6 3.8  --  3.8 3.7 3.5  --    AST 13 21 17  --  35 34 32 42   ALT 9 13 17  --  29 37 36 40       Recent Labs   Lab Test 12/29/16  1338 01/06/19  1350 01/15/19  1231 01/18/19  2219 01/19/19  0600 02/12/19  1212 08/03/19  1047 08/09/19  1905 01/23/20  1301 07/02/23  1924   LACT  --  1.1  --   --   --   --  1.9 0.5* 1.7 1.0   CRP  --   --  11.2* 9.6* 8.3* 1.4*  --   --  26.0*  --    SED 36*  --  107* 123* 116*  --   --   --  29*  --        Recent Labs   Lab Test 09/03/19  0944 09/09/19  0910 08/03/23  1014   TACROL 9.4   < > 5.6   MPACID 3.39  --   --    MPAG 52.8  --   --     < > = values in this interval not displayed.       Culture Results:  7-Day Micro Results       Procedure Component Value Units Date/Time    Group B strep PCR [[id number]] Collected: 08/04/23 1131    Order Status: Resulted Lab Status: In process Updated: 08/04/23 1201    Specimen: Swab from Rectovaginal     Wet preparation [[id number]]  (Abnormal) Collected: 08/04/23 0234    Order  Status: Completed Lab Status: Final result Updated: 08/04/23 0308    Specimen: Swab from Vagina      Trichomonas Absent     Yeast Absent     Clue Cells Absent     WBCs/high power field 2+    Chlamydia trachomatis/Neisseria gonorrhoeae by PCR [[id number]]  (Normal) Collected: 08/04/23 0234    Order Status: Completed Lab Status: Final result Updated: 08/04/23 1223    Specimen: Swab from Endocervical/cervical      Chlamydia Trachomatis Negative     Comment: Negative for C. trachomatis rRNA by transcription mediated amplification.   A negative result by transcription mediated amplification does not preclude the presence of infection because results are dependent on proper and adequate collection, absence of inhibitors and sufficient rRNA to be detected.        Neisseria gonorrhoeae Negative     Comment: Negative for N. gonorrhoeae rRNA by transcription mediated amplification. A negative result by transcription mediated amplification does not preclude the presence of C. trachomatis infection because results are dependent on proper and adequate collection, absence of inhibitors and sufficient rRNA to be detected.       Urine Culture [[id number]]  (Abnormal)  (Susceptibility) Collected: 08/02/23 1519    Order Status: Completed Lab Status: Final result Updated: 08/04/23 0940    Specimen: Urine from Nephrostomy, Right      Culture 50,000-100,000 CFU/mL Stenotrophomonas maltophilia    Susceptibility       Stenotrophomonas maltophilia (1)       Antibiotic Interpretation Sensitivity   Method Status    Ceftazidime Susceptible 8 ug/mL LINDA Final    Levofloxacin Susceptible 1 ug/mL LINDA Final    Trimethoprim/Sulfamethoxazole Susceptible <=0.5/9.5 ug/mL LINDA Final    Minocycline Susceptible <=2 ug/mL LINDA Final    Cefazolin  [*]     LINDA Final     Cefazolin LINDA breakpoints are for the treatment of uncomplicated urinary tract infections. For the treatment of systemic infections, please contact the laboratory for additional testing.                   [*]  Suppressed Antibiotic                           Recent Labs   Lab Test 23  1257 23  1532 23  1037 23  1116 23  1916   URINEPH 6.0 7.5* 7.0 6.5 5.5   NITRITE Negative Negative Negative Negative Positive*   LEUKEST Negative Small* Negative Moderate* Large*   WBCU 1 27* <1 6* 13*             Recent Labs   Lab Test 20  1132   IFLUA Not Detected   FLUAH1 Not Detected   FLUAH3 Not Detected   SS1522 Not Detected   IFLUB Not Detected   RSVA Not Detected   RSVB Not Detected   PIV1 Not Detected   PIV2 Not Detected   PIV3 Not Detected   HMPV Not Detected       Recent Labs   Lab Test 10/02/20  1345   CDBPCT Negative       Imaging: [facility] Single    Result Date: 2023          BP --------------------------------------------------------------------------------------------------------- Pat. Name: LIZZETTE PALMER       Study Date:  2023 1:34pm Pat. NO:  [id number]        Referring  MD: DAYANNA GRANT Site:  [facility]       Sonographer: Antoinette Lezama RDMS :  1990        Age:   32 --------------------------------------------------------------------------------------------------------- INDICATION --------------------------------------------------------------------------------------------------------- Maternal kidney transplant. Hypothyroidism. Bicornuate uterus. METHOD --------------------------------------------------------------------------------------------------------- Transabdominal ultrasound examination. View: Sufficient PREGNANCY --------------------------------------------------------------------------------------------------------- Yates pregnancy. Number of fetuses: 1 DATING ---------------------------------------------------------------------------------------------------------                                          Date                                Details                                                                                      Gest. age                       KRISSY LMP                                  2022                                                                                                                       32 w + 5 d                     2023 Prior assessment               2023                         GA: 8 w + 4 d                                                                            32 w + 3 d                     2023 Assigned dating                  Dating performed on 2023, based on the LMP                                                              32 w + 5 d                     2023 GENERAL EVALUATION --------------------------------------------------------------------------------------------------------- Cardiac activity present.  bpm. Fetal movements visualized. Presentation cephalic. Placenta Anterior Umbilical cord previously studied, 3 vessel cord. AMNIOTIC FLUID ASSESSMENT --------------------------------------------------------------------------------------------------------- Amount of AF: normal MVP 4.3 cm BIOPHYSICAL PROFILE --------------------------------------------------------------------------------------------------------- 2: Fetal breathing movements 2: Gross body movements 2: Fetal tone 2: Amniotic fluid volume 8 Biophysical profile score Interpretation: normal RECOMMENDATION --------------------------------------------------------------------------------------------------------- We discussed the findings on today's ultrasound with the patient. Continue  surveillance with weekly BPP. Thank-you for the opportunity to participate in the care of this patient. If you have questions regarding today's evaluation or if we can be of further service, please contact the [facility]. **Fetal anomalies may be present but not detected**     IMPRESSION --------------------------------------------------------------------------------------------------------- 1)  Yates intrauterine pregnancy at 32w 5d gestational age. 2) The BPP is reassuring. 3) The amniotic fluid volume appeared normal.

## 2023-08-04 NOTE — PROGRESS NOTES
"[facility] Antepartum Progress Note    Subjective: Patient feelings like contractions have increased in frequency and intensity, and she is feeling more pelvic pressure. Earlier, she noticed a gush of fluid after she used the bathroom. She does not think this was urine, because she felt like she had finished emptying her bladder already. No vaginal bleeding. Patient also reports tingling in her face, bilateral arms, and bilateral legs, and her muscles feel \"tight\". She reports she sometimes feels this way when her potassium is high. The tingling in her left arm radiates into her chest. She denies any chest pain or SOB.     Objective:  Vitals:    23 0720 23 0740 23 0803 23 0839   BP: (!) 156/90 (!) 188/95 (!) 141/90 137/71   BP Location:       Patient Position:       Cuff Size:       Pulse:       Resp:   20    Temp:       TempSrc:       SpO2:       Weight:       Height:         I/O last 3 completed shifts:  In:  [P.O.:1400]  Out:  [Urine:]    Gen: Resting comfortably in bed, NAD  CV: Regular rate, well perfused  Resp: Non-labored breathing on room air  Abd: Gravid, soft between contractions, non-tender, non-distended    SSE: Pooling of ~1 tablespoon clear thin fluid in posterior fornix, cervix visually ~1 cm dilated  SVE: /-2    FHT: Baseline 135 bpm, moderate variability, accelerations present, one late deceleration at 8:46 AM, but otherwise no recent decelerations  [place]: 5 contractions in 10 minutes    Assessment: Mona Wagner is a 32 year old  @ 33w0d by LMP c/w 8w4d US with a history of IgA nephropathy s/p renal transplant () with pregnancy complicated by allograft hydronephosis s/p PNT placement (), recurrent UTI, and multiple prior antepartum admissions, here now HD#2 with concern for pyelonephritis and  contractions. Patient remains afebrile and without any evidence of worsening infection. She had increasing contractions overnight, possibly due to infection vs. " electrolyte abnormality vs.  labor, and this morning now reports concern for ROM.     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 She has been afebrile throughout admission. VSS. No leukocytosis or other clinical evidence of pyelonephritis at present. Possible colonization of PNT. Plan to continue ceftazidime based on sensitivities that have now resulted, pending further evaluation as below. Creatinine elevated to 1.6 this admission and stable today (recent baseline 1.2-1.3). Earlier in this pregnancy, she was admitted from -6/10 after elevated BP in clinic, found to have allograft hydronephrosis and underwent PNT placement. She was then readmitted - with hyperkalemia in the setting of post-ATN diuresis, with course complicated by  contractions for which she received tocolysis, betamethasone, IV magnesium, and IV penicillin. She was readmitted - with E. Coli UTI, treated with IV ceftriaxone and discharged on Keflex prophylaxis. Patient follows with Dr. Mcintyre of transplant nephrology.   - Nephrology following, appreciate recommendations  - Continue D#2 ceftazidime q12h  - PNT in place since  (exchange scheduled for ); per nephrology, recommend ultrasound to assess allograft and PNT function today  - Continue PTA azathioprine, tacrolimus  - Tacrolimus level yesterday therapeutic; additional monitoring per nephrology    Hyperkalemia  Potassium increased to 5.5 this morning, then 6.1 on repeat this afternoon. EKG this morning with no acute changes. Per nephrology, no need for telemetry at present.   - Lokelma 30 mg PO  - Repeat BMP 6 hours after Lokelma    R/o  labor  R/o PPROM  Increasing contractions overnight, but cervical exam largely stable today at 1/90/-2 (previously 1.5-2/50/-1 overnight per  different examiner). Initially had concern for pooling on speculum exam this morning, but negative ROM+ and negative ferning; therefore, low suspicion for ROM at present. Patient previously received a course of steroids 6/10- during prior admission with  contractions, and started rescue course this morning.  - S/p nifedipine 10 mg + 10 mg today; continue 20 mg q6h for tocolysis  - S/p BMZ 6/10-; rescue course started 8/3  - Wet prep negative, GC/CT in process  - GBS pending     History of chronic HTN  Hypertension had resolved with transplant. Prior admissions in this pregnancy with severe range BPs in the setting of renal dysfunction/allograft hydronephrosis. Persistent mild range and non-sustained SR BP overnight, now improved somewhat following nifedipine tocolysis. Labs notable for elevated Cr as above and UPC increased to 0.8 (from 0.5 two weeks ago).  - No PTA medications  - Continue to closely monitor BP    Hypothyroidism  Secondary hyperparathyroidism with hypercalcemia   S/p resection of parathyroid glands on . Follows with endocrinology.  - Continue levothyroxine 50 mcg QD  - 23 TSH 2.51; today 0.83, within the range of recent TSH measurements for patient in the last 4 months  - Endocrinology next visit      History of DVT  History of upper extremity DVT in  associated with dialysis catheter. Assessment for inherited thrombophilias including Factor V Leiden and prothrombin gene mutation negative. APLS testing in this pregnancy notable only for weakly positive cardiolipin IgM Ab.   - Consider DVT prophylaxis if prolonged admission and no concern for evolving  labor      History of bacterial endocarditis  No acute issues. Last echo on 3/20 with LV hyperkinetic and LVEF >70%, RV function normal.       FATOUMATA  - Continue PTA CPAP      FGR, resolved  Last [place]  EFW 13%, AC 32%  - Serial growth US q3 weeks, next 8/10  - Weekly NST with [facility], next     Routine PNC:  -  Prenatal labs:               Rh: +  antibody: neg               HepB/HIV/RPR/HepC: nonreactive               GC/CT: neg               Rubella: non-immune  Varicella: non-immune               GCT: elevated; 3 hr GTT: wnl               Pap: NIL HPV neg  - Immunizations: s/p Flu and Covid. Due for Tdap. Will offer during admission   - Genetic screening: NIPT low-risk. Low-risk NT.     Patient seen with Dr. Zabala.    Nathalia Dobbs MD  OB/GYN PGY-2  2023 1:36 PM        Physician Attestation   I saw this patient with the resident and agree with the resident/fellow's findings and plan of care as documented in the note.      Key findings: patient is a  at 33w0d who was admitted last evening secondary to concerns for possible pyelonephritis versus stent colonization. The patient has also noted continued contractions that intensified over the evening/morning. Cervical exam remains unchanged but of note the patient began having spontaneous urination from her bladder and pelvic exam suggested possible descent of the fetal head.  The patient was also noted to have increased BP when she was having pain with her contractions.  She also noted symptoms this am of hyperkalemia and her K was found to be increased. Working with nephrology we are attempting celation with Lokelma and will monitor electrolytes closely. She is on a short course of immediate acting nifedipine and is receiving a rescue course of BTMS.  FHR monitoring is reassuring for fetal wellbeing.  The nephrologist is also investigating the renal graft and is discussing possible replacement of the nephrostomy tube versus removal.  We will continue close observation and treatment of UTI, Hyperkalemia and uterine contractions. The patient reports some atypical chest pain and arm numbness and the EKG and Trop level were not suggestive of cardiac ischemia.    50 MINUTES SPENT BY ME on the date of service doing chart review, history, exam, documentation & further  activities per the note.    I have personally reviewed the following data over the past 24 hrs:    9.3  \   11.3 (L)   / 241     134 (L) 100 34.8 (H) /  129 (H)   6.1 (HH) 15 (L) 1.53 (H) \       ALT: 40 AST: 42 AP: 149 (H) TBILI: 1.4 (H)   ALB: 3.5 TOT PROTEIN: 7.1 LIPASE: N/A       Trop: 11 BNP: N/A         Joseluis Zabala MD  Date of Service (when I saw the patient): 08/04/23    MFM Attending Addendum  After the patient was evaluated we also received results of bile acids which are increased and explain the patient symptom of itching. Will likely add Actigall to hydroxyzine to help with itching.  Delivery timing may be modified secondary all of the patient's co morbidities and at most will occur by 37 weeks gestation.    Joseluis Zabala

## 2023-08-04 NOTE — PROVIDER NOTIFICATION
08/04/23 1400   Provider Notification   Provider Name/Title Dr Dobbs   Method of Notification Electronic Page   Request Evaluate - Remote   Notification Reason Lab/Diagnostic Study     Patient's potassium was 6.1. Patient denies any feelings of when her potassium has been high in the past.

## 2023-08-05 LAB
ANION GAP SERPL CALCULATED.3IONS-SCNC: 12 MMOL/L (ref 7–15)
ANION GAP SERPL CALCULATED.3IONS-SCNC: 13 MMOL/L (ref 7–15)
BUN SERPL-MCNC: 30.6 MG/DL (ref 6–20)
BUN SERPL-MCNC: 31.1 MG/DL (ref 6–20)
CALCIUM SERPL-MCNC: 8.6 MG/DL (ref 8.6–10)
CALCIUM SERPL-MCNC: 8.8 MG/DL (ref 8.6–10)
CHLORIDE SERPL-SCNC: 101 MMOL/L (ref 98–107)
CHLORIDE SERPL-SCNC: 102 MMOL/L (ref 98–107)
CREAT SERPL-MCNC: 1.41 MG/DL (ref 0.51–0.95)
CREAT SERPL-MCNC: 1.41 MG/DL (ref 0.51–0.95)
DEPRECATED HCO3 PLAS-SCNC: 20 MMOL/L (ref 22–29)
DEPRECATED HCO3 PLAS-SCNC: 22 MMOL/L (ref 22–29)
GFR SERPL CREATININE-BSD FRML MDRD: 51 ML/MIN/1.73M2
GFR SERPL CREATININE-BSD FRML MDRD: 51 ML/MIN/1.73M2
GLUCOSE SERPL-MCNC: 138 MG/DL (ref 70–99)
GLUCOSE SERPL-MCNC: 189 MG/DL (ref 70–99)
GP B STREP DNA SPEC QL NAA+PROBE: NEGATIVE
POTASSIUM SERPL-SCNC: 4.6 MMOL/L (ref 3.4–5.3)
POTASSIUM SERPL-SCNC: 4.8 MMOL/L (ref 3.4–5.3)
SODIUM SERPL-SCNC: 135 MMOL/L (ref 136–145)
SODIUM SERPL-SCNC: 135 MMOL/L (ref 136–145)
TACROLIMUS BLD-MCNC: 7.9 UG/L (ref 5–15)
TME LAST DOSE: NORMAL H
TME LAST DOSE: NORMAL H

## 2023-08-05 PROCEDURE — 999N000285 HC STATISTIC VASC ACCESS LAB DRAW WITH PIV START

## 2023-08-05 PROCEDURE — 250N000013 HC RX MED GY IP 250 OP 250 PS 637

## 2023-08-05 PROCEDURE — 99233 SBSQ HOSP IP/OBS HIGH 50: CPT | Performed by: INTERNAL MEDICINE

## 2023-08-05 PROCEDURE — 36415 COLL VENOUS BLD VENIPUNCTURE: CPT | Performed by: STUDENT IN AN ORGANIZED HEALTH CARE EDUCATION/TRAINING PROGRAM

## 2023-08-05 PROCEDURE — 99232 SBSQ HOSP IP/OBS MODERATE 35: CPT | Mod: 25 | Performed by: OBSTETRICS & GYNECOLOGY

## 2023-08-05 PROCEDURE — 250N000011 HC RX IP 250 OP 636

## 2023-08-05 PROCEDURE — 250N000013 HC RX MED GY IP 250 OP 250 PS 637: Performed by: STUDENT IN AN ORGANIZED HEALTH CARE EDUCATION/TRAINING PROGRAM

## 2023-08-05 PROCEDURE — 250N000012 HC RX MED GY IP 250 OP 636 PS 637

## 2023-08-05 PROCEDURE — 250N000011 HC RX IP 250 OP 636: Mod: JZ | Performed by: SURGERY

## 2023-08-05 PROCEDURE — 59025 FETAL NON-STRESS TEST: CPT | Mod: 26 | Performed by: OBSTETRICS & GYNECOLOGY

## 2023-08-05 PROCEDURE — 80048 BASIC METABOLIC PNL TOTAL CA: CPT

## 2023-08-05 PROCEDURE — 999N000127 HC STATISTIC PERIPHERAL IV START W US GUIDANCE

## 2023-08-05 PROCEDURE — 82310 ASSAY OF CALCIUM: CPT | Performed by: STUDENT IN AN ORGANIZED HEALTH CARE EDUCATION/TRAINING PROGRAM

## 2023-08-05 PROCEDURE — 120N000002 HC R&B MED SURG/OB UMMC

## 2023-08-05 PROCEDURE — 250N000012 HC RX MED GY IP 250 OP 636 PS 637: Performed by: STUDENT IN AN ORGANIZED HEALTH CARE EDUCATION/TRAINING PROGRAM

## 2023-08-05 PROCEDURE — 80197 ASSAY OF TACROLIMUS: CPT | Performed by: INTERNAL MEDICINE

## 2023-08-05 RX ORDER — TACROLIMUS 1 MG/1
4 CAPSULE ORAL 2 TIMES DAILY
Status: DISCONTINUED | OUTPATIENT
Start: 2023-08-05 | End: 2023-08-15 | Stop reason: HOSPADM

## 2023-08-05 RX ORDER — DOCUSATE SODIUM 100 MG/1
100 CAPSULE, LIQUID FILLED ORAL 2 TIMES DAILY
Status: DISCONTINUED | OUTPATIENT
Start: 2023-08-05 | End: 2023-08-12

## 2023-08-05 RX ORDER — SODIUM CHLORIDE, SODIUM LACTATE, POTASSIUM CHLORIDE, CALCIUM CHLORIDE 600; 310; 30; 20 MG/100ML; MG/100ML; MG/100ML; MG/100ML
INJECTION, SOLUTION INTRAVENOUS
Status: DISCONTINUED
Start: 2023-08-05 | End: 2023-08-05 | Stop reason: HOSPADM

## 2023-08-05 RX ORDER — SIMETHICONE 125 MG
125 TABLET,CHEWABLE ORAL EVERY 6 HOURS PRN
Status: DISCONTINUED | OUTPATIENT
Start: 2023-08-05 | End: 2023-08-15 | Stop reason: HOSPADM

## 2023-08-05 RX ADMIN — TACROLIMUS 5 MG: 5 CAPSULE ORAL at 09:55

## 2023-08-05 RX ADMIN — AZATHIOPRINE 150 MG: 50 TABLET ORAL at 22:03

## 2023-08-05 RX ADMIN — ASPIRIN 81 MG CHEWABLE TABLET 81 MG: 81 TABLET CHEWABLE at 07:42

## 2023-08-05 RX ADMIN — URSODIOL 300 MG: 300 CAPSULE ORAL at 07:42

## 2023-08-05 RX ADMIN — SIMETHICONE 125 MG: 125 TABLET, CHEWABLE ORAL at 22:04

## 2023-08-05 RX ADMIN — CEFTAZIDIME 1 G: 1 INJECTION, POWDER, FOR SOLUTION INTRAMUSCULAR; INTRAVENOUS at 07:36

## 2023-08-05 RX ADMIN — LEVOTHYROXINE SODIUM 50 MCG: 25 TABLET ORAL at 07:42

## 2023-08-05 RX ADMIN — TACROLIMUS 4 MG: 1 CAPSULE ORAL at 22:03

## 2023-08-05 RX ADMIN — NIFEDIPINE 20 MG: 10 CAPSULE ORAL at 01:57

## 2023-08-05 RX ADMIN — NIFEDIPINE 20 MG: 10 CAPSULE ORAL at 07:42

## 2023-08-05 RX ADMIN — SODIUM BICARBONATE 650 MG TABLET 1300 MG: at 14:34

## 2023-08-05 RX ADMIN — PRENATAL VITAMINS-IRON FUMARATE 27 MG IRON-FOLIC ACID 0.8 MG TABLET 1 TABLET: at 19:55

## 2023-08-05 RX ADMIN — BETAMETHASONE SODIUM PHOSPHATE AND BETAMETHASONE ACETATE 12 MG: 3; 3 INJECTION, SUSPENSION INTRA-ARTICULAR; INTRALESIONAL; INTRAMUSCULAR at 04:46

## 2023-08-05 RX ADMIN — CYANOCOBALAMIN TAB 1000 MCG 1000 MCG: 1000 TAB at 07:42

## 2023-08-05 RX ADMIN — NIFEDIPINE 20 MG: 10 CAPSULE ORAL at 19:09

## 2023-08-05 RX ADMIN — Medication 50 MCG: at 07:42

## 2023-08-05 RX ADMIN — URSODIOL 300 MG: 300 CAPSULE ORAL at 14:34

## 2023-08-05 RX ADMIN — POLYETHYLENE GLYCOL 3350 17 G: 17 POWDER, FOR SOLUTION ORAL at 14:37

## 2023-08-05 RX ADMIN — SODIUM BICARBONATE 650 MG TABLET 1300 MG: at 19:55

## 2023-08-05 RX ADMIN — FAMOTIDINE 20 MG: 20 TABLET ORAL at 19:55

## 2023-08-05 RX ADMIN — MAGNESIUM OXIDE TAB 400 MG (241.3 MG ELEMENTAL MG) 800 MG: 400 (241.3 MG) TAB at 07:42

## 2023-08-05 RX ADMIN — CEFTAZIDIME 1 G: 1 INJECTION, POWDER, FOR SOLUTION INTRAMUSCULAR; INTRAVENOUS at 19:39

## 2023-08-05 RX ADMIN — URSODIOL 300 MG: 300 CAPSULE ORAL at 19:55

## 2023-08-05 RX ADMIN — DOCUSATE SODIUM 100 MG: 100 CAPSULE, LIQUID FILLED ORAL at 19:55

## 2023-08-05 RX ADMIN — SODIUM BICARBONATE 650 MG TABLET 1300 MG: at 07:42

## 2023-08-05 RX ADMIN — NIFEDIPINE 20 MG: 10 CAPSULE ORAL at 13:08

## 2023-08-05 ASSESSMENT — ACTIVITIES OF DAILY LIVING (ADL)
ADLS_ACUITY_SCORE: 31
ADLS_ACUITY_SCORE: 20
ADLS_ACUITY_SCORE: 31
ADLS_ACUITY_SCORE: 20
ADLS_ACUITY_SCORE: 31

## 2023-08-05 NOTE — PROGRESS NOTES
Brief Antepartum Progress Note    Delayed note entry due to patient care    PM rounds conducted with Dr. Zabala. Patient is currently resting comfortably, about to try to get some sleep. She reports her contractions have resolved, and she is feeling much better. Reviewed plan for ongoing treatment with antibiotics for possible pyelonephritis, and plan for ongoing management of hyperkalemia with repeat labs at 8 PM. FHT reassuring. We discussed that we may be able to decrease frequency of fetal monitoring later this evening if patient remains stable. Patient verbalized understanding. All questions answered.    Nathalia Dobbs MD  OB/GYN PGY-2

## 2023-08-05 NOTE — DISCHARGE SUMMARY
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              Discharge Medications:        Review of your medicines        START taking        Dose / Directions   mycophenolic acid 180 MG EC tablet  Commonly known as: GENERIC EQUIVALENT  Used for: Kidney replaced by transplant      Dose: 540 mg  Take 3 tablets (540 mg) by mouth 2 times daily  Quantity: 60 tablet  Refills: 1     tacrolimus 1 MG capsule  Commonly known as: GENERIC EQUIVALENT  Used for: Kidney replaced by transplant  Replaces: tacrolimus 5 MG capsule      Dose: 4 mg  Take 4 capsules (4 mg) by mouth 2 times daily  Quantity: 60 capsule  Refills: 0            CONTINUE these medicines which may have CHANGED, or have new prescriptions. If we are uncertain of the size of tablets/capsules you have at home, strength may be listed as something that might have changed.        Dose / Directions   azaTHIOprine 50 MG tablet  Commonly known as: IMURAN  This may have changed: when to take this  Used for: Kidney replaced by transplant      Dose: 150 mg  Take 3 tablets (150 mg)  by mouth daily  Quantity: 270 tablet  Refills: 3     calcium carbonate 1250 MG/5ML Susp suspension  This may have changed:   when to take this  reasons to take this  Used for: Calcium deficiency      Dose: 625 mg  Take 2.5 mLs (625 mg) by mouth daily  Quantity: 500 mL  Refills: 1     cyanocobalamin 1000 MCG tablet  Commonly known as: VITAMIN B-12  This may have changed: when to take this  Used for: Anemia during pregnancy in second trimester      Dose: 1,000 mcg  Take 1 tablet (1,000 mcg) by mouth daily  Quantity: 90 tablet  Refills: 1     hydrocortisone 2.5 % cream  This may have changed:   when to take this  reasons to take this  Used for: Itching      Apply topically 2 times daily  Quantity: 30 g  Refills: 3     levothyroxine 25 MCG tablet  Commonly known as: SYNTHROID/LEVOTHROID  This may have changed:   medication strength  how much to take  Used for: Acquired hypothyroidism      Dose: 25 mcg  Take 1 tablet (25 mcg) by mouth daily  Quantity: 30 tablet  Refills: 1     vitamin D3 50 mcg (2000 units) tablet  Commonly known as: CHOLECALCIFEROL  This may have changed: when to take this  Used for: Hypovitaminosis D      Dose: 1 tablet  Take 1 tablet (50 mcg) by mouth daily for 90 days  Quantity: 90 tablet  Refills: 3            CONTINUE these medicines which have NOT CHANGED        Dose / Directions   acetaminophen 325 MG tablet  Commonly known as: TYLENOL  Used for: Back pain, unspecified back location, unspecified back pain laterality, unspecified chronicity      Dose: 650 mg  Take 2 tablets (650 mg) by mouth every 4 hours as needed for mild pain  Quantity: 100 tablet  Refills: 3     lactase 3000 UNIT tablet  Commonly known as: LACTAID      Dose: 3,000 Units  Take 3,000 Units by mouth 3 times daily (with meals)  Refills: 0     magnesium oxide 400 MG tablet  Commonly known as: MAG-OX  Used for: Stage 3a chronic kidney disease (H)      Dose: 800 mg  Take 800 mg by mouth every morning  Quantity: 90 tablet  Refills:  1     polyethylene glycol 17 GM/Dose powder  Commonly known as: MIRALAX  Used for: Heartburn, Slow transit constipation      Dose: 1 capful.  Take 17 g (1 capful) by mouth daily as needed for constipation  Quantity: 507 g  Refills: 3     sodium bicarbonate 650 MG tablet      Dose: 1,300 mg  Take 2 tablets (1,300 mg) by mouth 3 times daily for 90 days  Quantity: 540 tablet  Refills: 3     Veltassa 8.4 g packet  Used for: Hyperkalemia  Generic drug: patiromer      Dose: 8.4 g  Take 8.4 g by mouth daily as needed (as directed by your transplant team, for potassium = or > 5.5)  Quantity: 5 packet  Refills: 1            STOP taking      aspirin 81 MG chewable tablet  Commonly known as: ASA        cephALEXin 500 MG capsule  Commonly known as: KEFLEX        docusate sodium 100 MG capsule  Commonly known as: COLACE        famotidine 20 MG tablet  Commonly known as: PEPCID        hydrOXYzine 10 MG tablet  Commonly known as: ATARAX        prenatal multivitamin w/iron 27-0.8 MG tablet        simethicone 125 MG chewable tablet  Commonly known as: MYLICON        tacrolimus 5 MG capsule  Commonly known as: GENERIC EQUIVALENT  Replaced by: tacrolimus 1 MG capsule                  Where to get your medicines        These medications were sent to [facility] [address]  [address] [address]      Phone: [phone]   levothyroxine 25 MCG tablet  mycophenolic acid 180 MG EC tablet  tacrolimus 1 MG capsule               Consultations:   Nephrology, Urology, Anesthesia, MFM, ID, NICU, Social Work          Brief History of Admission and Antepartum Course:   Ms. Mona Wagner is a 32 year old , at 32w6d by LMP confirmed by first trimester US, who presents with concerns for early onset recurrent pyleonephritis and worsening renal function based on BUN and Cr increasing. I suspect she is dehydrated and has prerenal azotemia and with rehydration her BUN/CR will return to  baseline.         Hospital Course:   On HD#1 she was started on IV antibiotics for pyelonephritis in the setting of prior renal transplant 2/2 IgA Nephropathy. She was found to have elevated bile acids in the setting of ICP and given ursodiol for symptomatic management. Her creatinine increased to 1.68 and she developed hyperkalemia which responded appropriately to Lokelma. Urine culture demonstrated Stenotrophomonas maltophilia. On HD#2, renal US was performed and there was no evidence of hydronephrosis or abscess. On HD#2, she was also evaluated for PTL and started on nifedipine with improvement in discomfort. Infectious testing was negative. She was started on a rescue course of BMZ. Exam was concerning for possible pooling. However, laboratory workup with ROM+ and ferning with both negative. She was started on ceftazidime on HD#4 of which was continued through discharge. She developed hyperkalemia that improved with oral chelating agents. She developed a metabolic acidosis and was started on bicarbonate. After worsening of her CAMERNO and worsening of her SI PreE without SF, decision was made to proceed with IOL on HD#9 at 34w0d. On HD#10, she had an  of a viable infant.     Delivery Note:  Mona Wagner is a 32 year old  at 34w1d who was admitted to the antepartum service for 10 days for concern for pyelonephritis and CAMERON in the setting of a hx of a renal transplant secondary to IgA nephropathy. Her pregnancy was notable for PNT placement, SI preE w/o SF, bicornuate uterus, ICP, Hypothyroidism, Hx DVT, Hx bacterial endocarditis. In the setting of her worsening CAMERON and SI PreE w/o SF, recommendation was made to proceed with an induction of labor. She was started on pitocin and AROMed. She receive an epidural for pain. At 0017 she was noted to be complete and began to push. At 0146 on , she she had an  of a liveborn male infant on 2023. The infant head was delivered in direct OA position. There  were no nuchal cords. Apgars were 7 and 9 and 1 and 5 minutes. Weight pending. The cord was allowed to pulse for 1 minute and was then clamped and cut. The infant was then placed on the patient's abdomen for immediate skin-to-skin. The baby was then taken over to the warmer to be evaluated by NICU. Routine cord blood was obtained. IV pitocin was started for active management of the third stage. The placenta was delivered with gentle downward traction. It was found to be intact with a three vessel cord. She sustained a second degree perineal laceration that was repaired in the usual fashion with a 3-0 Vicryl. Upon final inspection, there was good hemostasis and uterine tone was firm. Instrument and sharp count was correct. QBL: 91 mL    Postpartum course  The patient's postpartum course was notable for improvement of cholestasis of pregnancy. She was uptitrated to 60 mg BID Nifedipine, which controlled her blood pressures. She had a PNT exchange with IR on 8/14.  She was restarted on Mycophenolic Acid, tacrolimus was decreased to 4mg BID, and she was continued on azathioprine. Tacrolimus level was within range.  . The plan is for Mona to follow up with Transplant nephrology once discharged.     She had left greater than right lower extremity edema and with her history of Factor V leiden a bialteral LE doppler was ordered that was negative. Her FATOUMATA was managed with CPAP.     On discharge, her pain was well controlled. Vaginal bleeding is similar to peak menstrual flow.  Voiding without difficulty and appropriate output via PNT.  Ambulating well and tolerating a normal diet.  No fever.  Breastfeeding well.  Infant is stable.  She was discharged on post-partum day #3.    Post-partum hemoglobin: 9.7    Contraception: POPs    Rhogam was not indicated          Discharge Instructions and Follow-Up:     Discharge diet: Regular   Discharge activity: Activity as tolerated   Discharge follow-up: Follow-up in [facility] clinic on 8/18  at 11am. Follow-up with nephrology on 8/17.   Wound care: Drink plenty of fluids  Ice to area for comfort             Discharge Disposition:   Discharged to home    Kane Goel MD, MPH  Ob/Gyn Resident, PGY-3  08/16/23 5:38 PM

## 2023-08-05 NOTE — PLAN OF CARE
Goal Outcome Evaluation:       Pt in stable condition since care assumed at 1400.  VSS, pt comfortable and denies regular painful UCs.  FHT appropriate for GA, 3 UCs on toco over the hour of monitoring.  Statlock securing device for nephrostomy replaced.  Dr Hernandez consulting with nephrology about tacrolimus dosing since her level came back high.  Otherwise plan to continue with current cares.

## 2023-08-05 NOTE — PROGRESS NOTES
[facility]  Transplant Nephrology Consult  Date of Admission:  8/3/2023  Today's Date: 08/05/2023  Requesting physician: Joseluis Zabala MD    Recommendations:  - check potassium BID  -Regular diet and low K  - lokelma 30g tid prn as needed for hyperkalemia >6  - Continue IV ceftazidime for her UTI and switch to  oral suitable antibiotics for a total of 2 weeks  -PNT exchange as scheduled on 8/14/23 as outpatient   -start oral sodium bicarb 1300 mg tid    Assessment & Plan   # DDKT: Trend up   - Baseline Creatinine: ~ 1.2-1.4 with pregnancy, Pre-pregnancy was 0.8-1.1    - Proteinuria: Mild (0.5-1.0 grams), stated to trend up since July 2023   - Date DSA Last Checked: -Sep/2022      Latest DSA: No, cPRA : 25%   - BK Viremia: No   - Kidney Tx Biopsy: Sep 17, 2019; Result: No diagnostic evidence of acute rejection.     CAMERON likely 2/2 UTI plus Pre-renal element secondary to decreased PO intake  Pt with baseline creatinine was 1.1, trended up to 1.7 mg/dl then now down to 1.4.  Urine culture growing Stenotrophomonas maltophila and being treated with ceftazidime I  US transplant with no signs of obstruction    # Immunosuppression: Tacrolimus immediate release (goal 4-6) and Azathioprine (dose 150 mg daily)   - Patient is in an immunosuppressed state and will continue to monitor for efficacy and toxicity of immunosuppression medications.   - Changes: Not at this time, tacrolimus level pending     # Infection Prophylaxis:    Last CD4 Level: 201 (Jul/2020)   - PJP: None    # Hypertension: Controlled;  Goal BP: < 130/80   - Volume status: Euvolemic, pt was on florinef prior to pregnancy, encouraged good hydration   - Changes: change to long acting nifedipine upon discharge home    # Anemia in Chronic Renal Disease: Hgb: Stable, low      CHARLINE: No   - Iron studies: Replete    # Mineral Bone Disorder:   - Teriary renal hyperparathyroidism :Patient is s/p parathyroidectomy 4/2023  with 3.5 glands removed ; PTH level:  low <15         On treatment: None  - Vitamin D; level: Low        On supplement: Yes  - Calcium; level: Normal        On supplement: No    # Electrolytes:   - Potassium; level: normal        On supplement: No  - Bicarbonate; level: Low        On supplement: Yes  - Sodium; level: Low    Hyperkalemia, noted likely 2/2 CAMERON, ? Obstruction and/or tacrolimus.   Better this morning  On K binders as needed    # Hyponatremia:               -Slightly low serum sodium level. No edema; common in pregnancy due to increased total body water and increased ADH    # Ureteral Stenosis/Hydronephrosis: Patient with moderate hydronephrosis of kidney transplant and developed CAMERON.  She underwent PNT 6/9/23 with repeat kidney transplant ultrasound 6/11 still showing moderate hydronephrosis suggesting this may be a functional hydronephrosis from pregnancy.  However, patient does report resolution of edema and dyspnea, as well as apparent post-obstructive diuresis.  She has a previous h/o ureteral stenosis with ureteral stent removed 2/2021.  Previous kidney transplant ultrasound 7/2021 also showed moderate hydronephrosis unchanged with voiding. Hydronephrosis was unchanged on 7/6/22 abdominal CT.                -Most recent U/S 7/5/23 with improvement in hydronephrosis with PNT in place   -now making some urine through urethra   -she will continue to have PCN so long as she is draining through it and likely for 4 weeks after delivery     # Pregnancy: Patient is 33 weeks pregnant. Followed by MFM.     # GERD: Controlled on H2 blocler.     # Hyperprolactinemia: Normal prolactin level with last check.  Felt secondary to hypothyroidism versus kidney failure, or less likely now a pituitary microadenoma.  Followed by Endocrinology.     # Right Axillary Lymph Node: Patient was referred to general surgery for excisional biopsy in 8/2022, but decision was made to watch the node.  Node appears to be stable for  the last year at least.              - Would consider excisional biopsy post partum.     # Skin Cancer Risk:               - Discussed sun protection and recommend regular follow up with Dermatology.     # Medical Compliance: Yes    # Transplant History:  Etiology of Kidney Failure: Unknown etiology (no kidney biopsy)  Tx: DDKT  Transplant: 8/3/2019 (Kidney)  Significant changes in immunosuppression:  changed from mycophenolate to Azathioprine for pregnancy.  Significant transplant-related complications: Transplant ureteral stenosis    Recommendations were communicated to the primary team verbally and via this note    CHRIS OROZCO MD  Pager: [phone]    History of Present Illness   Mona Wagner is a 32 year old female past medical history of ESKD of unclear etiology, status post DDKT in 2019, CKD, high risk pregnancy admitted for increased contractions.  On admission noted to have creatinine elevation from baseline of 1.3-1 0.4 to 1.6 and also noted to have hyperkalemia at potassium of 5.5, repeat was 6.1.  There is associated metabolic acidosis as well.  Patient endorsed having pain in her right lower quadrant that is radiating into her groin and contractions at the same time. UA was dirty and culture was positive. Denied other significant GI issues lately. Was able to tolerate PO but pain made her not to be hydrated well encough in the past 24 to 48 hours. Endorses compliant with her medications.    Review of Systems    The 10 point Review of Systems is negative other than noted in the HPI or here.     Past Medical History    I have reviewed this patient's medical history and updated it with pertinent information if needed.   Past Medical History:   Diagnosis Date    Anemia in chronic kidney disease     Bacteremia 07/05/2023    Difficult intubation     see 4/28/23 anesthesia notes    History of bacterial endocarditis     History of blood transfusion     History of DVT (deep vein thrombosis) 2014    History of  seizure 2014    History of subarachnoid hemorrhage     Hydronephrosis     Hypothyroidism     Kidney transplanted 08/03/2019    DCD DDKT. Induction with thymo 6mg/kg.    Orthostatic hypotension     FATOUMATA (obstructive sleep apnea)     Pyelonephritis of transplanted kidney 01/23/2020    Secondary renal hyperparathyroidism (H)     Urinary tract infection        Past Surgical History   I have reviewed this patient's surgical history and updated it with pertinent information if needed.  Past Surgical History:   Procedure Laterality Date    BENCH KIDNEY N/A 8/3/2019    Procedure: BACKBENCH PREPARATION, ALLOGRAFT, KIDNEY;  Surgeon: Dorian Johnson MD;  Location: [facility] OR    COMBINED CYSTOSCOPY, RETROGRADES, EXCHANGE STENT URETER(S) Right 11/23/2020    Procedure: CYSTOSCOPY, CYSOTGRAM, WITH RETROGRADE PYELOGRAM, ureteroscopy,  AND URETERAL STENT;  Surgeon: Wally Britt MD;  Location: [facility] OR    COMBINED CYSTOSCOPY, RETROGRADES, URETEROSCOPY, LASER HOLMIUM LITHOTRIPSY URETER(S), INSERT STENT N/A 12/6/2019    Procedure: CYSTOURETEROSCOPY, WITH RETROGRADE PYELOGRAM of transplant kidney, STENT INSERTION, Laser on standby;  Surgeon: Wally Britt MD;  Location: [facility] OR    CREATE FISTULA ARTERIOVENOUS UPPER EXTREMITY  1/2/2014    Procedure: CREATE FISTULA ARTERIOVENOUS UPPER EXTREMITY;  Left Wrist Arteriovenous Fistula Placement;  Surgeon: Shashi Castro MD;  Location: [facility] OR    CREATE FISTULA ARTERIOVENOUS UPPER EXTREMITY      CYSTOSCOPY, RETROGRADES, INSERT STENT URETER(S), COMBINED N/A 8/20/2020    Procedure: CYSTOSCOPY, WITH RETROGRADE PYELOGRAM, CYSTOGRAM AND URETERAL STENT INSERTION - TRANSPLANT KIDNEY;  Surgeon: Wally Britt MD;  Location: [facility] OR    IR CEREBRAL ANGIOGRAM  9/17/2014    IR NEPHROSTOMY TUBE PLACEMENT RIGHT  6/9/2023    PARATHYROIDECTOMY Bilateral 4/28/2023    Procedure: Bilateral Resection of 3 and 1/2 parathyroid glands;  Surgeon: Melissa Jones MD;  Location: [facility] OR    PERCUTANEOUS  BIOPSY KIDNEY Right 2019    Procedure: Right Kidney Biopsy;  Surgeon: Deny Rios MD;  Location:  OR    RASTA/DIALYSIS CATHETER  12/10/2013         TRANSPLANT KIDNEY RECIPIENT  DONOR N/A 8/3/2019    Procedure: TRANSPLANT, KIDNEY, RECIPIENT,  DONOR with Ureteral Stent Placement;  Surgeon: Dorian Johnson MD;  Location: [facility] OR       Family History   I have reviewed this patient's family history and updated it with pertinent information if needed.   Family History   Problem Relation Age of Onset    Kidney Disease Mother     Kidney failure Mother     Coronary Artery Disease Father     Heart Disease Father     Sleep Apnea Father     Neurologic Disorder Father         corticobasal degeneration    Parkinsonism Father     Hypertension Sister     Diabetes Sister     Cerebrovascular Disease Sister     Kidney Disease Sister     Breast Cancer No family hx of     Cancer - colorectal No family hx of     Ovarian Cancer No family hx of     Prostate Cancer No family hx of     Other Cancer No family hx of     Asthma No family hx of     Anesthesia Reaction No family hx of     Deep Vein Thrombosis (DVT) No family hx of     Melanoma No family hx of     Skin Cancer No family hx of     Thrombosis No family hx of        Social History   I have reviewed this patient's social history and updated it with pertinent information if needed. Mona Wagner  reports that she has never smoked. She has never used smokeless tobacco. She reports that she does not drink alcohol and does not use drugs.    Allergies   Allergies   Allergen Reactions    Contrast Dye Rash     CT contrast allergy 19 rash over eyes. Need to have pre medication before a CT WITH CONTRAST     Diatrizoate Rash     CT contrast allergy 19 rash over eyes. Need to have pre medication before a CT WITH CONTRAST     Lisinopril Swelling     angioedema    Nitrous Oxide Other (See Comments)     Sense of doom    Chlorhexidine Rash     Rash at site     Sulfa Antibiotics Rash     Muscle stiffness of neck    Dapsone      Methemoglobinemia    Furosemide Other (See Comments)     Skin flushing    Metrogel [Metronidazole]      Hives diffusely on body after vaginal administration.    Azithromycin Dizziness and Rash    Cefuroxime Rash     Prior to Admission Medications    aspirin  81 mg Oral Daily    azaTHIOprine  150 mg Oral QPM    cefTAZidime  1 g Intravenous Q12H    cyanocobalamin  1,000 mcg Oral QAM    levothyroxine  50 mcg Oral Daily    magnesium oxide  800 mg Oral QAM    NIFEdipine  20 mg Oral Q6H    prenatal multivitamin w/iron  1 tablet Oral QPM    senna-docusate  1 tablet Oral BID    Or    senna-docusate  2 tablet Oral BID    sodium bicarbonate  1,300 mg Oral TID    tacrolimus  5 mg Oral BID    ursodiol  300 mg Oral TID    vitamin D3  50 mcg Oral QAM      - MEDICATION INSTRUCTIONS -      sodium bicarbonate 75 mEq in NaCl 0.45 % 1,075 mL infusion 100 mL/hr at 23 1619       Physical Exam   Temp  Av.6  F (37  C)  Min: 97.6  F (36.4  C)  Max: 100.7  F (38.2  C)      Pulse  Av.2  Min: 67  Max: 105 Resp  Av.9  Min: 14  Max: 20  SpO2  Av.5 %  Min: 98 %  Max: 99 %     /61   Pulse 85   Temp 98.2  F (36.8  C) (Oral)   Resp 16   Ht 1.524 m (5')   Wt 80.3 kg (177 lb 1.6 oz)   LMP 2022   SpO2 99%   BMI 34.59 kg/m     Date 23 07 - 23 0659   Shift 1050-2284 6715-9337 1903-0747 24 Hour Total   INTAKE   P.O. 600   600   Shift Total(mL/kg) 600(7.53)   600(7.53)   OUTPUT   Urine 850   850   Shift Total(mL/kg) 850(10.67)   850(10.67)   Weight (kg) 79.7 79.7 79.7 79.7      Admit Weight: 79.7 kg (175 lb 11.2 oz)     GENERAL APPEARANCE: alert and in distress with contractions  HENT: no gross abnormalities noted  LYMPHATICS: no cervical or supraclavicular nodes  RESP: lungs clear to auscultation - no rales, rhonchi or wheezes  CV: regular rhythm, normal rate, no rub, no murmur  EDEMA: no LE edema bilaterally  ABDOMEN: soft,  protubarant because of pregnancy, no increased tenderness on transplanted kidney  MS: extremities normal - no gross deformities noted, no evidence of inflammation in joints, no muscle tenderness  SKIN: rash noted on skin near to her percutaneous nephrostomy tube   NEURO: mentation intact and speech normal  PSYCH: mentation appears normal and affect normal/bright    Data   CMP  Recent Labs   Lab 08/05/23  0911 08/04/23 2015 08/04/23  1310 08/04/23  0706 08/03/23  1804 08/03/23  1014 07/31/23  1017   * 132* 134* 137 136 136 136   POTASSIUM 4.8 4.1 6.1* 5.5* 5.3 5.2 4.9   CHLORIDE 102 100 100 105 103 103 104   CO2 20* 19* 15* 17* 21* 21* 17*   ANIONGAP 13 13 19* 15 12 12 15   * 222* 129* 86 107* 80 85   BUN 30.6* 33.6* 34.8* 35.4* 34.8* 35.4* 29.6*   CR 1.41* 1.68* 1.53* 1.63*  1.61* 1.67* 1.64* 1.33*   GFRESTIMATED 51* 42* 46* 43*  44* 42* 43* 55*   SHAMIR 8.8 8.9 9.2 9.4 9.4 9.6 9.9   PROTTOTAL  --   --   --   --  7.1 7.3 7.5   ALBUMIN  --   --   --   --  3.5 3.7 3.8   BILITOTAL  --   --   --   --  1.4* 1.5* 1.4*   ALKPHOS  --   --   --   --  149* 151* 139*   AST  --   --   --  42 32 34 35   ALT  --   --   --  40 36 37 29     CBC  Recent Labs   Lab 08/04/23  0706 08/03/23  1804 08/03/23  1014 07/31/23  1017   HGB 11.3* 10.6* 11.0* 10.1*   WBC 9.3 7.9 8.4 8.7   RBC 3.52* 3.43* 3.58* 3.26*   HCT 34.7* 34.2* 34.5* 32.1*   MCV 99 100 96 99   MCH 32.1 30.9 30.7 31.0   MCHC 32.6 31.0* 31.9 31.5   RDW 14.7 14.6 14.6 14.6    261 253 234     INRNo lab results found in last 7 days.  ABGNo lab results found in last 7 days.   Urine Studies  Recent Labs   Lab Test 07/02/23  1916 06/30/23  1116 06/26/23  1037 06/11/23  1532 06/22/22  0811 06/15/22  1145 02/26/21  1210 02/26/21  1143   COLOR Yellow Light Yellow Light Yellow Straw   < > Yellow   < > Yellow   APPEARANCE Slightly Cloudy* Slightly Cloudy* Clear Clear   < > Clear   < > Clear   URINEGLC Negative Negative Negative Negative   < > Negative   < > Negative    URINEBILI Negative Negative Negative Negative   < > Negative   < > Negative   URINEKETONE Negative Negative Negative Negative   < > Negative   < > Negative   SG 1.010 1.003 1.008 1.006   < > 1.010   < > 1.020   UBLD Moderate* Negative Negative Large*   < > Negative   < > Large*   URINEPH 5.5 6.5 7.0 7.5*   < > 6.0   < > 6.5   PROTEIN 50* Negative 20* 30*   < > Negative   < > 100*   UROBILINOGEN  --   --   --   --   --  0.2  --  0.2   NITRITE Positive* Negative Negative Negative   < > Negative   < > Negative   LEUKEST Large* Moderate* Negative Small*   < > Negative   < > Small*   RBCU 2 <1 <1 151*   < > None Seen   < >  --    WBCU 13* 6* <1 27*   < > 0-5   < >  --     < > = values in this interval not displayed.     Recent Labs   Lab Test 02/09/21  0922 02/02/21  0920 08/04/20  0905 04/29/20  0850 02/03/20  1035 12/03/19  1215 09/03/19  0940   UTPG 0.28* 0.38* Unable to calculate due to low value Unable to calculate due to low value 0.13 0.15 0.24*     PTH  Recent Labs   Lab Test 04/29/23  0552 04/28/23  1025 02/06/23  1023 01/18/23  0949 04/01/22  1000 03/03/21  0912 08/04/20  0905 12/03/19  1112 08/09/19  0559   PTHI 3* 13* 59 79* 365* 251* 229* 210* 358*     Iron Studies  Recent Labs   Lab Test 07/31/23  1017 05/22/23  1023 05/22/23  1022 04/25/23  1035 07/13/22  1220 08/04/20  0905 02/17/20  0850 12/03/19  1112 09/03/19  0944 08/30/19  1700 08/12/19  0619 01/20/19  0603 12/29/16  1338   IRON 94  --  80 49 51 64 95 47 87 138 96 43 137     --  195* 181* 217* 198* 225* 200* 254 270 162*  --  228*   IRONSAT 35  --  41 27 24 32 42 23 34 51* 59*  --  60*   MONET 1,525* 1,618*  --  1,770* 1,111* 1,065* 1,139* 1,003* 1,535* 1,746*  --  1,780* 1,039*       IMAGING:  All imaging studies reviewed by me.     CHRIS OROZCO MD

## 2023-08-05 NOTE — PROGRESS NOTES
Recommend stopping bicarb gtt and continuing bicarb 1300mg TID.     Recommend changing short acting nifedipine to long acting nifedipine 60mg daily prior to discharge (start tmrw AM).     Ok to hold lokelma. She has patiromer at home PRN.     She should have PNT exchange as scheduled on 8/14/23 as outpatient    Gelacio Mcintyre MD, [facility]  Transplant Nephrology  Pager: [phone]

## 2023-08-05 NOTE — PLAN OF CARE
VSS. Afebrile. Patient comfortable overnight. Procardia given q6hr, patient not noticing any contractions, occasional noted on tocometry. 2nd beta given at 0430. IV sodium bicarb dose completed, oral dose given as scheduled. Potassium level improved. Mona able to shower last evening with linen change. Redness around nephrology tube site, unchanged throughout shift. Tagaderm changed over site per patient's request. Tube is draining with adequate output. Patient is also able to urinate via urethra. Rebeca Waters at bedside. Patient will have labs drawn this morning and renal US to further determine plan for the day.

## 2023-08-05 NOTE — PROGRESS NOTES
"M Antepartum Progress Note    Subjective: Patient feeling much improved from yesterday. No vaginal bleeding or LOF. She feels ocassional ctxs but they are non painful.  Patient no longer feeling tingling in her face, bilateral arms, and bilateral legs, or her muscles feel \"tight\". She reports an active fetus. The pain over her renal graft is gone.  She continues to have urination from her bladder. She denies any chest pain or SOB.     Objective:  /81   Pulse 85   Temp 98.2  F (36.8  C) (Oral)   Resp 16   Ht 1.524 m (5')   Wt 80.3 kg (177 lb 1.6 oz)   LMP 2022   SpO2 99%   BMI 34.59 kg/m      Intake/Output Summary (Last 24 hours) at 2023 1046  Last data filed at 2023 0926  Gross per 24 hour   Intake 2450 ml   Output 3325 ml   Net -875 ml     Renal US results reviewed. No abscess or renal fluid around graft. Few bubbles in location of nephrostomy tube.     Gen: Resting comfortably in bed, NAD  CV: Regular rate, well perfused  Resp: Non-labored breathing on room air  Abd: Gravid, soft between contractions, non-tender, non-distended    SSE/SVE: deferred      FHT: Baseline 130 bpm, moderate variability, accelerations present, no deceleration.  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 Patient remains afebrile and with evidence of improvement in her infection. contractions have resolved overnight, suspect the contractions were related to Hyperkalemia which is now resolved. She also has new diagnosis of intrahepatic cholestasis of pregnancy and was started on Actigall and is still noting itching at night. " Procedure Date:  2022    Patient: Krishna Fonseca Jr.  : 1951    Sedation: MAC    Outpatient History & Physical Review for Colonoscopy     Indications: Personal Hx of Colonic Polyps    Family History of Colon Cancer or Colon Polyps: No    Current Outpatient Medications   Medication Sig Dispense Refill   • electrolyte/PEG 3350 (NULYTELY) 420 g solution      • doxazosin (CARDURA) 4 MG tablet Take 1 tablet by mouth daily. 90 tablet 3   • metoPROLOL succinate (TOPROL-XL) 25 MG 24 hr tablet Take 1 tablet by mouth daily. 90 tablet 3   • atorvastatin (LIPITOR) 40 MG tablet TAKE 1 TABLET BY MOUTH DAILY 90 tablet 3   • amLODIPine (NORVASC) 10 MG tablet TAKE 1 TABLET BY MOUTH DAILY 90 tablet 3   • triamterene-hydrochlorothiazide (MAXZIDE) 75-50 MG per tablet Take 1 tablet by mouth daily. 90 tablet 3     Current Facility-Administered Medications   Medication Dose Route Frequency Provider Last Rate Last Admin   • lidocaine HCl (PF) (XYLOCAINE) 1 % injection 5 mg  5 mg Subcutaneous PRN Daily Denny MD       • metoPROLOL tartrate (LOPRESSOR) tablet 25 mg  25 mg Oral Once PRN Daily Denny MD       • sodium chloride 0.9 % flush bag 25 mL  25 mL Intravenous PRN Daily Denny MD       • sodium chloride (PF) 0.9 % injection 2 mL  2 mL Intracatheter 2 times per day Daily Denny MD       • sodium chloride 0.9% infusion   Intravenous Continuous Daily Denny MD       • sodium chloride 0.9% infusion   Intravenous Continuous Hunter Cedeno MD           ALLERGIES:  Patient has no known allergies.            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 She has been afebrile throughout admission. VSS. No leukocytosis or other clinical evidence of pyelonephritis at present. Possible colonization of PNT. Plan to continue ceftazidime based on sensitivities that have now resulted. Creatinine has decreased since admission and is now 1.41 this morning (recent baseline 1.2-1.3). Earlier in this pregnancy, she was admitted from -6/10 after elevated BP in clinic, found to have allograft hydronephrosis and underwent PNT placement. She was then readmitted - with hyperkalemia in the setting of post-ATN diuresis, with course complicated by  contractions for which she received tocolysis, betamethasone, IV magnesium, and IV penicillin. She was readmitted - with E. Coli UTI, treated with IV ceftriaxone and discharged on Keflex prophylaxis. Patient follows with Dr. Mcintyre of transplant nephrology.   - Nephrology following, appreciate recommendations  - Continue D#3 ceftazidime q12h, like switch to PO antibiotic in the same class tomorrow  - PNT in place since  (exchange scheduled for ); per nephrology, will await further recommendations from nephrology  - Continue PTA azathioprine, tacrolimus  - Tacrolimus level yesterday therapeutic; additional monitoring per nephrology    Hyperkalemia-resolved  Potassium increased yesterday to 6.1. EKG yesterday no acute changes. Per nephrology, no need for telemetry at present.   - Lokelma 30 mg PO yesterday  - K today 4.8, continue daily monitoring and repeat Lokelma per nephrology.     labor versus  contractions secondary to hyperkalemia-now improved  Cervical exam largely stable /-2 (previously 1.5-2/50/-1 on admission and also previous admission). Patient previously  5/08    pT2c  3+3=6/10   • Other and unspecified hyperlipidemia    • Overweight(278.02) 4/17/2009   • Stage 3a chronic kidney disease (CMS/HCC) 11/3/2020   • Unspecified essential hypertension 5/1/2001     Past Surgical History:   Procedure Laterality Date   • Colonoscopy diagnostic  10/23/09    colon in 5 yrs   • Colonoscopy remove lesions by snare  03/07/07    Multiple polyps snared and bx-adenoma x7,rpt 1 yr, Dr. Cedeno   • Colonoscopy w biopsy  01/29/2016    Colon 5yrs,adenoma x2,.    • Flexible sigmoidoscopy diagnostic include specimens  5/2000    Normal   • Lap prostatec retro rad/nerve  10/22/08    DVP. bilat Nerve sparing/ T2c 3+3=6/10   • Repair ing hernia,5+y/o,reducibl     • Xray colon single contrast study  4/2000    Normal ACBE         PHYSICAL EXAM:  HEENT: Within normal limits  LUNGS: Lungs clear to auscultation. No cold symptoms present.  HEART: S1,S2, regular rate and rhythm no murmer.  NEUROLOGICAL: Within normal limits.  MENTAL STATUS: Alert, oriented x 3, interactive.  SKIN: warm, dry and intact, no lesions or rashes.   : N\A    The risks of the procedure including the possibility of bleeding, perforation (possible resulting in laparotomy/colostomy) aspiration, and the risk of a polypectomy were discussed with the patient who accepts these risks.             received a course of steroids 6/10- during prior admission with  contractions, and completed rescue course  and .  - On nifedipine 20 mg [id number] discontinue tomorrow am  - S/p BMZ 6/10-; and rescue course  and   - Wet prep negative, GC/CT neg  - GBS neg yesterday     History of chronic HTN  Hypertension had resolved with transplant. Prior admissions in this pregnancy with severe range BPs in the setting of renal dysfunction/allograft hydronephrosis. Persistent mild range and non-sustained SR BP with pain and contractions, now improved with nifedipine tocolysis. Labs notable for elevated Cr as above and UPC increased to 0.8 (from 0.5 two weeks ago).  - No PTA medications  - Continue to closely monitor BP    Hypothyroidism  Secondary hyperparathyroidism with hypercalcemia   S/p resection of parathyroid glands on . Follows with endocrinology.  - Continue levothyroxine 50 mcg QD  - 23 TSH 2.51; today 0.83, within the range of recent TSH measurements for patient in the last 4 months  - Endocrinology next visit      History of DVT  History of upper extremity DVT in  associated with dialysis catheter. Assessment for inherited thrombophilias including Factor V Leiden and prothrombin gene mutation negative. APLS testing in this pregnancy notable only for weakly positive cardiolipin IgM Ab.   - Consider DVT prophylaxis if prolonged admission and no concern for evolving  labor      History of bacterial endocarditis  No acute issues. Last echo on 3/20 with LV hyperkinetic and LVEF >70%, RV function normal.       FATOUMATA  - Continue PTA CPAP      FGR, resolved  Last Martin  EFW 13%, AC 32%  - Serial growth US q3 weeks, next 8/10  - Weekly NST with [facility], next     Routine PNC:  - Prenatal labs:               Rh: +  antibody: neg               HepB/HIV/RPR/HepC: nonreactive               GC/CT: neg               Rubella: non-immune  Varicella: non-immune               GCT:  elevated; 3 hr GTT: wnl               Pap: NIL HPV neg  - Immunizations: s/p Flu and Covid. Due for Tdap. Will offer during admission   - Genetic screening: NIPT low-risk. Low-risk NT.     Cholestasis of Pregnancy  Bile Acids 47 (8/3/2023), total bili 1.4(increaed), AST/ALT 32/36  -started Actigall 300 mg TID last evening  -continue vistaril as needed  -continue TID FHR monitoring       Medical Decision Making       30 MINUTES SPENT BY ME on the date of service doing chart review, history, exam, documentation & further activities per the note.        Joseluis Zabala MD  Date of Service (when I saw the patient): 08/05/23

## 2023-08-06 LAB
ANION GAP SERPL CALCULATED.3IONS-SCNC: 11 MMOL/L (ref 7–15)
ANION GAP SERPL CALCULATED.3IONS-SCNC: 12 MMOL/L (ref 7–15)
BUN SERPL-MCNC: 28.9 MG/DL (ref 6–20)
BUN SERPL-MCNC: 30.7 MG/DL (ref 6–20)
CALCIUM SERPL-MCNC: 8.5 MG/DL (ref 8.6–10)
CALCIUM SERPL-MCNC: 8.6 MG/DL (ref 8.6–10)
CHLORIDE SERPL-SCNC: 103 MMOL/L (ref 98–107)
CHLORIDE SERPL-SCNC: 105 MMOL/L (ref 98–107)
CREAT SERPL-MCNC: 1.27 MG/DL (ref 0.51–0.95)
CREAT SERPL-MCNC: 1.33 MG/DL (ref 0.51–0.95)
DEPRECATED HCO3 PLAS-SCNC: 22 MMOL/L (ref 22–29)
DEPRECATED HCO3 PLAS-SCNC: 23 MMOL/L (ref 22–29)
GFR SERPL CREATININE-BSD FRML MDRD: 55 ML/MIN/1.73M2
GFR SERPL CREATININE-BSD FRML MDRD: 58 ML/MIN/1.73M2
GLUCOSE SERPL-MCNC: 123 MG/DL (ref 70–99)
GLUCOSE SERPL-MCNC: 144 MG/DL (ref 70–99)
POTASSIUM SERPL-SCNC: 4.3 MMOL/L (ref 3.4–5.3)
POTASSIUM SERPL-SCNC: 4.3 MMOL/L (ref 3.4–5.3)
SODIUM SERPL-SCNC: 137 MMOL/L (ref 136–145)
SODIUM SERPL-SCNC: 139 MMOL/L (ref 136–145)

## 2023-08-06 PROCEDURE — 99232 SBSQ HOSP IP/OBS MODERATE 35: CPT | Mod: 25 | Performed by: OBSTETRICS & GYNECOLOGY

## 2023-08-06 PROCEDURE — 120N000002 HC R&B MED SURG/OB UMMC

## 2023-08-06 PROCEDURE — 250N000011 HC RX IP 250 OP 636: Mod: JZ | Performed by: OBSTETRICS & GYNECOLOGY

## 2023-08-06 PROCEDURE — 82947 ASSAY GLUCOSE BLOOD QUANT: CPT | Performed by: STUDENT IN AN ORGANIZED HEALTH CARE EDUCATION/TRAINING PROGRAM

## 2023-08-06 PROCEDURE — 250N000013 HC RX MED GY IP 250 OP 250 PS 637

## 2023-08-06 PROCEDURE — 99233 SBSQ HOSP IP/OBS HIGH 50: CPT | Performed by: INTERNAL MEDICINE

## 2023-08-06 PROCEDURE — 250N000012 HC RX MED GY IP 250 OP 636 PS 637: Performed by: STUDENT IN AN ORGANIZED HEALTH CARE EDUCATION/TRAINING PROGRAM

## 2023-08-06 PROCEDURE — 59025 FETAL NON-STRESS TEST: CPT | Mod: 26 | Performed by: OBSTETRICS & GYNECOLOGY

## 2023-08-06 PROCEDURE — 82310 ASSAY OF CALCIUM: CPT | Performed by: STUDENT IN AN ORGANIZED HEALTH CARE EDUCATION/TRAINING PROGRAM

## 2023-08-06 PROCEDURE — 250N000011 HC RX IP 250 OP 636: Mod: JZ | Performed by: SURGERY

## 2023-08-06 PROCEDURE — 250N000013 HC RX MED GY IP 250 OP 250 PS 637: Performed by: OBSTETRICS & GYNECOLOGY

## 2023-08-06 PROCEDURE — 36415 COLL VENOUS BLD VENIPUNCTURE: CPT | Performed by: STUDENT IN AN ORGANIZED HEALTH CARE EDUCATION/TRAINING PROGRAM

## 2023-08-06 PROCEDURE — 250N000012 HC RX MED GY IP 250 OP 636 PS 637

## 2023-08-06 PROCEDURE — 250N000013 HC RX MED GY IP 250 OP 250 PS 637: Performed by: STUDENT IN AN ORGANIZED HEALTH CARE EDUCATION/TRAINING PROGRAM

## 2023-08-06 PROCEDURE — 82374 ASSAY BLOOD CARBON DIOXIDE: CPT | Performed by: STUDENT IN AN ORGANIZED HEALTH CARE EDUCATION/TRAINING PROGRAM

## 2023-08-06 RX ORDER — CITRIC ACID/SODIUM CITRATE 334-500MG
SOLUTION, ORAL ORAL
Status: DISCONTINUED
Start: 2023-08-06 | End: 2023-08-06 | Stop reason: HOSPADM

## 2023-08-06 RX ORDER — NIFEDIPINE 30 MG/1
60 TABLET, EXTENDED RELEASE ORAL DAILY
Status: DISCONTINUED | OUTPATIENT
Start: 2023-08-06 | End: 2023-08-08

## 2023-08-06 RX ORDER — CEFTAZIDIME 1 G/1
1 INJECTION, POWDER, FOR SOLUTION INTRAMUSCULAR; INTRAVENOUS EVERY 8 HOURS
Status: DISCONTINUED | OUTPATIENT
Start: 2023-08-06 | End: 2023-08-08 | Stop reason: HOSPADM

## 2023-08-06 RX ADMIN — DOCUSATE SODIUM 100 MG: 100 CAPSULE, LIQUID FILLED ORAL at 09:43

## 2023-08-06 RX ADMIN — SODIUM BICARBONATE 650 MG TABLET 1300 MG: at 15:33

## 2023-08-06 RX ADMIN — CYANOCOBALAMIN TAB 1000 MCG 1000 MCG: 1000 TAB at 09:44

## 2023-08-06 RX ADMIN — TACROLIMUS 4 MG: 1 CAPSULE ORAL at 21:37

## 2023-08-06 RX ADMIN — NIFEDIPINE 60 MG: 30 TABLET, FILM COATED, EXTENDED RELEASE ORAL at 13:09

## 2023-08-06 RX ADMIN — NIFEDIPINE 20 MG: 10 CAPSULE ORAL at 07:02

## 2023-08-06 RX ADMIN — URSODIOL 300 MG: 300 CAPSULE ORAL at 09:44

## 2023-08-06 RX ADMIN — SODIUM BICARBONATE 650 MG TABLET 1300 MG: at 20:10

## 2023-08-06 RX ADMIN — Medication 50 MCG: at 09:43

## 2023-08-06 RX ADMIN — NIFEDIPINE 20 MG: 10 CAPSULE ORAL at 01:09

## 2023-08-06 RX ADMIN — URSODIOL 300 MG: 300 CAPSULE ORAL at 15:33

## 2023-08-06 RX ADMIN — SIMETHICONE 125 MG: 125 TABLET, CHEWABLE ORAL at 17:50

## 2023-08-06 RX ADMIN — CEFTAZIDIME 1 G: 1 INJECTION, POWDER, FOR SOLUTION INTRAMUSCULAR; INTRAVENOUS at 07:50

## 2023-08-06 RX ADMIN — FAMOTIDINE 20 MG: 20 TABLET ORAL at 17:08

## 2023-08-06 RX ADMIN — PRENATAL VITAMINS-IRON FUMARATE 27 MG IRON-FOLIC ACID 0.8 MG TABLET 1 TABLET: at 20:10

## 2023-08-06 RX ADMIN — LEVOTHYROXINE SODIUM 50 MCG: 25 TABLET ORAL at 07:53

## 2023-08-06 RX ADMIN — ASPIRIN 81 MG CHEWABLE TABLET 81 MG: 81 TABLET CHEWABLE at 07:53

## 2023-08-06 RX ADMIN — DOCUSATE SODIUM 100 MG: 100 CAPSULE, LIQUID FILLED ORAL at 20:10

## 2023-08-06 RX ADMIN — URSODIOL 300 MG: 300 CAPSULE ORAL at 20:10

## 2023-08-06 RX ADMIN — AZATHIOPRINE 150 MG: 50 TABLET ORAL at 21:37

## 2023-08-06 RX ADMIN — ACETAMINOPHEN 650 MG: 325 TABLET, FILM COATED ORAL at 02:50

## 2023-08-06 RX ADMIN — CEFTAZIDIME 1 G: 1 INJECTION, POWDER, FOR SOLUTION INTRAMUSCULAR; INTRAVENOUS at 16:26

## 2023-08-06 RX ADMIN — POLYETHYLENE GLYCOL 3350 17 G: 17 POWDER, FOR SOLUTION ORAL at 11:29

## 2023-08-06 RX ADMIN — SODIUM BICARBONATE 650 MG TABLET 1300 MG: at 09:43

## 2023-08-06 RX ADMIN — MAGNESIUM OXIDE TAB 400 MG (241.3 MG ELEMENTAL MG) 800 MG: 400 (241.3 MG) TAB at 09:43

## 2023-08-06 RX ADMIN — TACROLIMUS 4 MG: 1 CAPSULE ORAL at 09:56

## 2023-08-06 ASSESSMENT — ACTIVITIES OF DAILY LIVING (ADL)
ADLS_ACUITY_SCORE: 20

## 2023-08-06 NOTE — PROVIDER NOTIFICATION
08/05/23 2343   Provider Notification   Provider Name/Title Dr. Hernandez   Method of Notification At Bedside   Request Evaluate in Person   Notification Reason Uterine Activity  (pt deferred cervix check at this time. Plan per provider is to watch for another hour and reassess after.)     Pt continues to feel contractions intermittently after 250 mL fluid bolus. She reports feeling tightness and menstrual cramps. Plan per provider was to check cervix, but pt would like to wait and see how contractions are in the next hour.

## 2023-08-06 NOTE — PROVIDER NOTIFICATION
08/05/23 2211   Provider Notification   Provider Name/Title Dr. Hernandez   Method of Notification Electronic Page   Request Evaluate in Person   Notification Reason Uterine Activity  (pt feeling painful & tight contractions 8-9 min apart. no cramping. mild palapationg. please assess)     Pt feeling contractions 8-9 minutes apart. Notified provider about pt having contractions with some pain and tightness. Plan per provider was 250 ml bolus and reassess after.

## 2023-08-06 NOTE — PLAN OF CARE
VSS. Pt has not been able to get much rest d/t frequent ctx's and continuous monitoring of baby. Consent for  and blood transfusion signed on paper. No late or prolong decelerations has occurred since, plan to continue keeping baby on the monitor until otherwise. Pt is on a clear fluid diet. EFM normal at this time, see flow sheets for more. Pt reports frequent contractions this morning 5-7 minutes apart that feel like menstrual cramps and pain that radiates to her lower back. Plan per provider is to check cervix sometime, especially if her contractions get worse. Per report from pt, contractions feel unchanged since 0250. Labs drawn to rule out hyperkalemia.

## 2023-08-06 NOTE — PROGRESS NOTES
"[facility] Antepartum Progress Note    Subjective: Patient events over night reviewed and patient interviewed. This morning she is feeling improved and less contractions. No vaginal bleeding or LOF.  Patient no longer feeling tingling in her face, bilateral arms, and bilateral legs, or her muscles feel \"tight\". She reports an active fetus. The pain over her renal graft is gone.  She continues to have urination from her bladder. She denies any chest pain or SOB.     Objective:  Patient Vitals for the past 24 hrs:   BP Temp Temp src Resp   23 1129 (!) 133/92 98  F (36.7  C) Oral 17   23 0700 137/78 98  F (36.7  C) Oral 18   23 0250 123/66 -- -- 20   23 2353 119/72 98  F (36.7  C) Oral 18   23 1950 130/75 -- -- --   23 1909 114/63 -- -- --   23 1740 126/69 97.6  F (36.4  C) Oral 16   23 1308 128/71 -- -- --     Intake/Output Summary (Last 24 hours) at 2023 1223  Last data filed at 2023 1131  Gross per 24 hour   Intake 5105 ml   Output 4150 ml   Net 955 ml       Renal US . No abscess or renal fluid around graft. Few bubbles in location of nephrostomy tube.     Gen: Resting comfortably in bed, NAD  CV: Regular rate, well perfused  Resp: Non-labored breathing on room air  Abd: Gravid, soft between contractions, non-tender, non-distended    SSE/SVE: deferred      FHT: Baseline 135 bpm, moderate variability, accelerations present, deceleration no loger present.  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 Patient remains afebrile and with evidence of improvement in her infection. " contractions increased over night, but better now. She also has new diagnosis of intrahepatic cholestasis of pregnancy and was started on Actigall and is still noting itching at night.     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 She has been afebrile throughout admission. VSS. No leukocytosis or other clinical evidence of pyelonephritis at present. Possible colonization of PNT. Plan to continue ceftazidime based on sensitivities that have now resulted. Creatinine has decreased since admission and is now 1.27 this morning (recent baseline 1.2-1.3). Earlier in this pregnancy, she was admitted from -6/10 after elevated BP in clinic, found to have allograft hydronephrosis and underwent PNT placement. She was then readmitted - with hyperkalemia in the setting of post-ATN diuresis, with course complicated by  contractions for which she received tocolysis, betamethasone, IV magnesium, and IV penicillin. She was readmitted - with E. Coli UTI, treated with IV ceftriaxone and discharged on Keflex prophylaxis. Patient follows with Dr. Mcintyre of transplant nephrology.   - Nephrology following, appreciate recommendations  - switch to oral equivalent of ceftazidime today. Will discuss with pharmacy for antibiotic choice and dose  - PNT in place since  (exchange scheduled for ); patient has had increased urination from bladder since admission with increased hydration.  - Continue PTA azathioprine, tacrolimus  - Tacrolimus level therapeutic; additional monitoring per nephrology    Hyperkalemia-resolved  Potassium increased friday to 6.1. EKG yesterday no acute changes. Per nephrology, no need for telemetry at present.   - Lokelma 30 mg PO yesterday  - K today 4.8, continue daily monitoring and repeat Lokelma per  nephrology.     labor versus  contractions secondary to hyperkalemia-now improved  Cervical exam largely stable /-2 (previously 1.5-2/50/-1 on admission and also previous admission). Patient previously received a course of steroids 6/10- during prior admission with  contractions, and completed rescue course  and .  - On nifedipine 20 mg [id number] to long acting nifedipine 60 mg qam today  - S/p BMZ 6/10-; and rescue course  and   - Wet prep negative, GC/CT neg  - GBS neg yesterday     History of chronic HTN  Hypertension had resolved with transplant. Prior admissions in this pregnancy with severe range BPs in the setting of renal dysfunction/allograft hydronephrosis. Persistent mild range and non-sustained SR BP with pain and contractions, now improved with nifedipine tocolysis. Labs notable for elevated Cr as above and UPC increased to 0.8 (from 0.5 two weeks ago).  - No PTA medications  - Continue to closely monitor BP    Hypothyroidism  Secondary hyperparathyroidism with hypercalcemia   S/p resection of parathyroid glands on . Follows with endocrinology.  - Continue levothyroxine 50 mcg QD  - 23 TSH 2.51; today 0.83, within the range of recent TSH measurements for patient in the last 4 months  - Endocrinology next visit      History of DVT  History of upper extremity DVT in  associated with dialysis catheter. Assessment for inherited thrombophilias including Factor V Leiden and prothrombin gene mutation negative. APLS testing in this pregnancy notable only for weakly positive cardiolipin IgM Ab.   - Consider DVT prophylaxis if prolonged admission and no concern for evolving  labor      History of bacterial endocarditis  No acute issues. Last echo on 3/20 with LV hyperkinetic and LVEF >70%, RV function normal.       FATOUMATA  - Continue PTA CPAP      FGR, resolved  Last [place]  EFW 13%, AC 32%  - Serial growth US q3 weeks, next 8/10  - Weekly NST  with [facility], next 7/5    Routine PNC:  - Prenatal labs:               Rh: +  antibody: neg               HepB/HIV/RPR/HepC: nonreactive               GC/CT: neg               Rubella: non-immune  Varicella: non-immune               GCT: elevated; 3 hr GTT: wnl               Pap: NIL HPV neg  - Immunizations: s/p Flu and Covid. Due for Tdap. Will offer during admission   - Genetic screening: NIPT low-risk. Low-risk NT.     Cholestasis of Pregnancy  Bile Acids 47 (8/3/2023), total bili 1.4(increaed), AST/ALT 32/36  -started Actigall 300 mg TID last evening  -continue vistaril as needed  -continue TID FHR monitoring       Medical Decision Making       30 MINUTES SPENT BY ME on the date of service doing chart review, history, exam, documentation & further activities per the note.        Joseluis Zabala MD  Date of Service (when I saw the patient): 08/06/23

## 2023-08-06 NOTE — PROGRESS NOTES
Brief Progress Note    Notified by RN that patient having uncomfortable contractions. Additionally, now having late decelerations with contractions. Discussed giving 250 mL bolus. Still you after bolus with late decelerations. SVE 1/90/-2 (unchanged from prior).     Attempted repositioning with minimal improvement. Will give another 250 mL bolus. If decels do not improve over 1 hour, will need to move towards delivery. Patient with no signs of hyperkalemia, otherwise feeling well. BP are normotensive, not particularly low.    Discussed expectations for surgery and recovery, including surgical risks of bleeding; need for transfusion; infection; injury to uterus, fallopian tubes, ovaries, bowel, bladder, nerves, blood vessels, ureters, or baby. Discussed possible need for classical uteirne incision and that patient would then have to have  sections at 36-37w for all future deliveries. Additionally discussed remote risk of hysterectomy in the case of uncontrollable bleeding. Patient agreeable to proceed with surgery if necessary, written informed consent signed.    Discussed plan with Dr. Sagrario Hernandez MD  Obstetrics & Gynecology, PGY-4  2023 2:08 AM

## 2023-08-06 NOTE — PROVIDER NOTIFICATION
23 0100   Provider Notification   Provider Name/Title Dr. Hernandez   Method of Notification At Bedside   Request Evaluate in Person   Notification Reason Labor Status;SVE     Notified provider about pt having subtle late decelerations with contractions. SVE per provider was /- unchanged from previous. Talked to pt about potential delivery via  if FHR continues to drop. Nurse reassured pt. Pt agreeable with plan.

## 2023-08-06 NOTE — PROVIDER NOTIFICATION
23 0151   Provider Notification   Provider Name/Title Dr. Hernandez   Method of Notification At Bedside   Request Evaluate in Person   Notification Reason Status Update  (Fluid bolus; consented pt for possible c-sec & blood transfusion)     Notified provider about a prolong decel. Plan per provider is to give another 250 ml bolus of fluid and watch for another hour. If pt continues to have dips, provider recommends delivery via . Provider consented pt to  and blood transfusion. Nurse reassured pt and empathized with how pt was feeling. Pt agreeable with plan.

## 2023-08-06 NOTE — PROGRESS NOTES
Brief Progress Note     Discussion with pharmacy re: transition from IV abx to PO. Per their recs, no clear oral agent with the same coverage, recommend continuing IV abx. Will plan to continue IV antibiotics today, reach out to ID tomorrow regarding a good PO agent.     Discussed with Dr. Sagrario Lr MD MPH  OB/Gyn Resident PGY-3  8/6/2023  7:49 PM

## 2023-08-06 NOTE — PROGRESS NOTES
[facility]  Transplant Nephrology Consult  Date of Admission:  8/3/2023  Today's Date: 08/06/2023  Requesting physician: Joseluis Zabala MD    Recommendations:  - check potassium daily  -Regular diet adjusted to low K  - lokelma 30g as needed for hyperkalemia >6  - switch to  oral suitable antibiotics for a total of 2 weeks  -PNT exchange as scheduled on 8/14/23 as outpatient   -continue oral sodium bicarb 1300 mg tid  -check tacrolimus trough on 8/8  -Ok to discharge from a renal standpoint    Assessment & Plan   # DDKT: Trend up   - Baseline Creatinine: ~ 1.2-1.4 with pregnancy, Pre-pregnancy was 0.8-1.1    - Proteinuria: Mild (0.5-1.0 grams), stated to trend up since July 2023   - Date DSA Last Checked: -Sep/2022      Latest DSA: No, cPRA : 25%   - BK Viremia: No   - Kidney Tx Biopsy: Sep 17, 2019; Result: No diagnostic evidence of acute rejection.     CAMERON likely 2/2 UTI plus Pre-renal element secondary to decreased PO intake  Pt with baseline creatinine was 1.1, trended up to 1.7 mg/dl then now down to 1.2mg/dl.  Urine culture growing Stenotrophomonas maltophila and being treated with ceftazidime I  US transplant with no signs of obstruction    # Immunosuppression: Tacrolimus immediate release (goal 4-6) and Azathioprine (dose 150 mg daily)   - Patient is in an immunosuppressed state and will continue to monitor for efficacy and toxicity of immunosuppression medications.   - Changes: tacrolimus to 4 mg bid with trough levels on 8/8    # Infection Prophylaxis:    Last CD4 Level: 201 (Jul/2020)   - PJP: None    # Hypertension: Controlled;  Goal BP: < 130/80   - Volume status: Euvolemic, pt was on florinef prior to pregnancy, encouraged good hydration   - Changes: change to long acting nifedipine upon discharge home    # Anemia in Chronic Renal Disease: Hgb: Stable, low      CHARLINE: No   - Iron studies: Replete    # Mineral Bone Disorder:   - Teriary renal  hyperparathyroidism :Patient is s/p parathyroidectomy 4/2023 with 3.5 glands removed ; PTH level:  low <15         On treatment: None  - Vitamin D; level: Low        On supplement: Yes  - Calcium; level: Normal        On supplement: No    # Electrolytes:   - Potassium; level: normal        On supplement: No  - Bicarbonate; level: Low        On supplement: Yes  - Sodium; level: Low    Hyperkalemia, noted likely 2/2 CAMERON, ? Obstruction and/or tacrolimus.   Better this morning  On K binders as needed    # Hyponatremia:               -Slightly low serum sodium level. No edema; common in pregnancy due to increased total body water and increased ADH    # Ureteral Stenosis/Hydronephrosis: Patient with moderate hydronephrosis of kidney transplant and developed CAEMRON.  She underwent PNT 6/9/23 with repeat kidney transplant ultrasound 6/11 still showing moderate hydronephrosis suggesting this may be a functional hydronephrosis from pregnancy.  However, patient does report resolution of edema and dyspnea, as well as apparent post-obstructive diuresis.  She has a previous h/o ureteral stenosis with ureteral stent removed 2/2021.  Previous kidney transplant ultrasound 7/2021 also showed moderate hydronephrosis unchanged with voiding. Hydronephrosis was unchanged on 7/6/22 abdominal CT.                -Most recent U/S 7/5/23 with improvement in hydronephrosis with PNT in place   -now making some urine through urethra   -she will continue to have PCN so long as she is draining through it and likely for 4 weeks after delivery     # Pregnancy: Patient is 33 weeks pregnant. Followed by MFM.     # GERD: Controlled on H2 blocler.     # Hyperprolactinemia: Normal prolactin level with last check.  Felt secondary to hypothyroidism versus kidney failure, or less likely now a pituitary microadenoma.  Followed by Endocrinology.     # Right Axillary Lymph Node: Patient was referred to general surgery for excisional biopsy in 8/2022, but decision  was made to watch the node.  Node appears to be stable for the last year at least.              - Would consider excisional biopsy post partum.     # Skin Cancer Risk:               - Discussed sun protection and recommend regular follow up with Dermatology.     # Medical Compliance: Yes    # Transplant History:  Etiology of Kidney Failure: Unknown etiology (no kidney biopsy)  Tx: DDKT  Transplant: 8/3/2019 (Kidney)  Significant changes in immunosuppression:  changed from mycophenolate to Azathioprine for pregnancy.  Significant transplant-related complications: Transplant ureteral stenosis    Recommendations were communicated to the primary team verbally and via this note    CHRIS OROZCO MD  Pager: [phone]    History of Present Illness   Mona Wagner is a 32 year old female past medical history of ESKD of unclear etiology, status post DDKT in 2019, CKD, high risk pregnancy admitted for increased contractions.  On admission noted to have creatinine elevation from baseline of 1.3-1 0.4 to 1.6 and also noted to have hyperkalemia at potassium of 5.5, repeat was 6.1.  There is associated metabolic acidosis as well.  Patient endorsed having pain in her right lower quadrant that is radiating into her groin and contractions at the same time. UA was dirty and culture was positive. Denied other significant GI issues lately. Was able to tolerate PO but pain made her not to be hydrated well encough in the past 24 to 48 hours. Endorses compliant with her medications.    Review of Systems    The 10 point Review of Systems is negative other than noted in the HPI or here.     Past Medical History    I have reviewed this patient's medical history and updated it with pertinent information if needed.   Past Medical History:   Diagnosis Date    Anemia in chronic kidney disease     Bacteremia 07/05/2023    Difficult intubation     see 4/28/23 anesthesia notes    History of bacterial endocarditis     History of blood transfusion      History of DVT (deep vein thrombosis) 2014    History of seizure 2014    History of subarachnoid hemorrhage     Hydronephrosis     Hypothyroidism     Kidney transplanted 08/03/2019    DCD DDKT. Induction with thymo 6mg/kg.    Orthostatic hypotension     FATOUMATA (obstructive sleep apnea)     Pyelonephritis of transplanted kidney 01/23/2020    Secondary renal hyperparathyroidism (H)     Urinary tract infection        Past Surgical History   I have reviewed this patient's surgical history and updated it with pertinent information if needed.  Past Surgical History:   Procedure Laterality Date    BENCH KIDNEY N/A 8/3/2019    Procedure: BACKBENCH PREPARATION, ALLOGRAFT, KIDNEY;  Surgeon: Dorian Johnson MD;  Location: [facility] OR    COMBINED CYSTOSCOPY, RETROGRADES, EXCHANGE STENT URETER(S) Right 11/23/2020    Procedure: CYSTOSCOPY, CYSOTGRAM, WITH RETROGRADE PYELOGRAM, ureteroscopy,  AND URETERAL STENT;  Surgeon: Wally Britt MD;  Location: [facility] OR    COMBINED CYSTOSCOPY, RETROGRADES, URETEROSCOPY, LASER HOLMIUM LITHOTRIPSY URETER(S), INSERT STENT N/A 12/6/2019    Procedure: CYSTOURETEROSCOPY, WITH RETROGRADE PYELOGRAM of transplant kidney, STENT INSERTION, Laser on standby;  Surgeon: Wally Britt MD;  Location: [facility] OR    CREATE FISTULA ARTERIOVENOUS UPPER EXTREMITY  1/2/2014    Procedure: CREATE FISTULA ARTERIOVENOUS UPPER EXTREMITY;  Left Wrist Arteriovenous Fistula Placement;  Surgeon: Shashi Castro MD;  Location: [facility] OR    CREATE FISTULA ARTERIOVENOUS UPPER EXTREMITY      CYSTOSCOPY, RETROGRADES, INSERT STENT URETER(S), COMBINED N/A 8/20/2020    Procedure: CYSTOSCOPY, WITH RETROGRADE PYELOGRAM, CYSTOGRAM AND URETERAL STENT INSERTION - TRANSPLANT KIDNEY;  Surgeon: Wally Britt MD;  Location: [facility] OR    IR CEREBRAL ANGIOGRAM  9/17/2014    IR NEPHROSTOMY TUBE PLACEMENT RIGHT  6/9/2023    PARATHYROIDECTOMY Bilateral 4/28/2023    Procedure: Bilateral Resection of 3 and 1/2 parathyroid glands;  Surgeon:  Melissa Jones MD;  Location: [facility] OR    PERCUTANEOUS BIOPSY KIDNEY Right 2019    Procedure: Right Kidney Biopsy;  Surgeon: Deny Rios MD;  Location:  OR    RASTA/DIALYSIS CATHETER  12/10/2013         TRANSPLANT KIDNEY RECIPIENT  DONOR N/A 8/3/2019    Procedure: TRANSPLANT, KIDNEY, RECIPIENT,  DONOR with Ureteral Stent Placement;  Surgeon: Dorian Johnson MD;  Location: [facility] OR       Family History   I have reviewed this patient's family history and updated it with pertinent information if needed.   Family History   Problem Relation Age of Onset    Kidney Disease Mother     Kidney failure Mother     Coronary Artery Disease Father     Heart Disease Father     Sleep Apnea Father     Neurologic Disorder Father         corticobasal degeneration    Parkinsonism Father     Hypertension Sister     Diabetes Sister     Cerebrovascular Disease Sister     Kidney Disease Sister     Breast Cancer No family hx of     Cancer - colorectal No family hx of     Ovarian Cancer No family hx of     Prostate Cancer No family hx of     Other Cancer No family hx of     Asthma No family hx of     Anesthesia Reaction No family hx of     Deep Vein Thrombosis (DVT) No family hx of     Melanoma No family hx of     Skin Cancer No family hx of     Thrombosis No family hx of        Social History   I have reviewed this patient's social history and updated it with pertinent information if needed. Mona Wagner  reports that she has never smoked. She has never used smokeless tobacco. She reports that she does not drink alcohol and does not use drugs.    Allergies   Allergies   Allergen Reactions    Contrast Dye Rash     CT contrast allergy 19 rash over eyes. Need to have pre medication before a CT WITH CONTRAST     Diatrizoate Rash     CT contrast allergy 19 rash over eyes. Need to have pre medication before a CT WITH CONTRAST     Lisinopril Swelling     angioedema    Nitrous Oxide Other (See  Comments)     Sense of doom    Chlorhexidine Rash     Rash at site    Sulfa Antibiotics Rash     Muscle stiffness of neck    Dapsone      Methemoglobinemia    Furosemide Other (See Comments)     Skin flushing    Metrogel [Metronidazole]      Hives diffusely on body after vaginal administration.    Azithromycin Dizziness and Rash    Cefuroxime Rash     Prior to Admission Medications    aspirin  81 mg Oral Daily    azaTHIOprine  150 mg Oral QPM    cefTAZidime  1 g Intravenous Q12H    cyanocobalamin  1,000 mcg Oral QAM    docusate sodium  100 mg Oral BID    lactated ringers  250 mL Intravenous Once    levothyroxine  50 mcg Oral Daily    magnesium oxide  800 mg Oral QAM    NIFEdipine  20 mg Oral Q6H    prenatal multivitamin w/iron  1 tablet Oral QPM    sodium bicarbonate  1,300 mg Oral TID    sodium citrate-citric acid        tacrolimus  4 mg Oral BID    ursodiol  300 mg Oral TID    vitamin D3  50 mcg Oral QAM      - MEDICATION INSTRUCTIONS -      sodium bicarbonate 75 mEq in NaCl 0.45 % 1,075 mL infusion 100 mL/hr at 23 1619       Physical Exam   Temp  Av.6  F (37  C)  Min: 97.6  F (36.4  C)  Max: 100.7  F (38.2  C)      Pulse  Av.2  Min: 67  Max: 105 Resp  Av.9  Min: 14  Max: 20  SpO2  Av.5 %  Min: 98 %  Max: 99 %     /78 (BP Location: Right arm, Patient Position: Semi-Carrasco's, Cuff Size: Adult Regular)   Pulse 85   Temp 98  F (36.7  C) (Oral)   Resp 18   Ht 1.524 m (5')   Wt 80.3 kg (177 lb 1.6 oz)   LMP 2022   SpO2 99%   BMI 34.59 kg/m     Date 23 07 - 23 0659   Shift 2649-6062 8533-7048 4888-7679 24 Hour Total   INTAKE   P.O. 600   600   Shift Total(mL/kg) 600(7.53)   600(7.53)   OUTPUT   Urine 850   850   Shift Total(mL/kg) 850(10.67)   850(10.67)   Weight (kg) 79.7 79.7 79.7 79.7      Admit Weight: 79.7 kg (175 lb 11.2 oz)     GENERAL APPEARANCE: alert and in distress with contractions  HENT: no gross abnormalities noted  LYMPHATICS: no cervical or  supraclavicular nodes  RESP: lungs clear to auscultation - no rales, rhonchi or wheezes  CV: regular rhythm, normal rate, no rub, no murmur  EDEMA: no LE edema bilaterally  ABDOMEN: soft, protubarant because of pregnancy, no increased tenderness on transplanted kidney  MS: extremities normal - no gross deformities noted, no evidence of inflammation in joints, no muscle tenderness  SKIN: rash noted on skin near to her percutaneous nephrostomy tube   NEURO: mentation intact and speech normal  PSYCH: mentation appears normal and affect normal/bright    Data   CMP  Recent Labs   Lab 08/06/23  0613 08/05/23  2050 08/05/23  0911 08/04/23 2015 08/04/23  1310 08/04/23  0706 08/03/23  1804 08/03/23  1014 07/31/23  1017    135* 135* 132*   < > 137 136 136 136   POTASSIUM 4.3 4.6 4.8 4.1   < > 5.5* 5.3 5.2 4.9   CHLORIDE 105 101 102 100   < > 105 103 103 104   CO2 22 22 20* 19*   < > 17* 21* 21* 17*   ANIONGAP 12 12 13 13   < > 15 12 12 15   * 189* 138* 222*   < > 86 107* 80 85   BUN 28.9* 31.1* 30.6* 33.6*   < > 35.4* 34.8* 35.4* 29.6*   CR 1.27* 1.41* 1.41* 1.68*   < > 1.63*  1.61* 1.67* 1.64* 1.33*   GFRESTIMATED 58* 51* 51* 42*   < > 43*  44* 42* 43* 55*   SHAMIR 8.6 8.6 8.8 8.9   < > 9.4 9.4 9.6 9.9   PROTTOTAL  --   --   --   --   --   --  7.1 7.3 7.5   ALBUMIN  --   --   --   --   --   --  3.5 3.7 3.8   BILITOTAL  --   --   --   --   --   --  1.4* 1.5* 1.4*   ALKPHOS  --   --   --   --   --   --  149* 151* 139*   AST  --   --   --   --   --  42 32 34 35   ALT  --   --   --   --   --  40 36 37 29    < > = values in this interval not displayed.     CBC  Recent Labs   Lab 08/04/23  0706 08/03/23  1804 08/03/23  1014 07/31/23  1017   HGB 11.3* 10.6* 11.0* 10.1*   WBC 9.3 7.9 8.4 8.7   RBC 3.52* 3.43* 3.58* 3.26*   HCT 34.7* 34.2* 34.5* 32.1*   MCV 99 100 96 99   MCH 32.1 30.9 30.7 31.0   MCHC 32.6 31.0* 31.9 31.5   RDW 14.7 14.6 14.6 14.6    261 253 234     INRNo lab results found in last 7 days.  MANUELo  lab results found in last 7 days.   Urine Studies  Recent Labs   Lab Test 07/02/23  1916 06/30/23  1116 06/26/23  1037 06/11/23  1532 06/22/22  0811 06/15/22  1145 02/26/21  1210 02/26/21  1143   COLOR Yellow Light Yellow Light Yellow Straw   < > Yellow   < > Yellow   APPEARANCE Slightly Cloudy* Slightly Cloudy* Clear Clear   < > Clear   < > Clear   URINEGLC Negative Negative Negative Negative   < > Negative   < > Negative   URINEBILI Negative Negative Negative Negative   < > Negative   < > Negative   URINEKETONE Negative Negative Negative Negative   < > Negative   < > Negative   SG 1.010 1.003 1.008 1.006   < > 1.010   < > 1.020   UBLD Moderate* Negative Negative Large*   < > Negative   < > Large*   URINEPH 5.5 6.5 7.0 7.5*   < > 6.0   < > 6.5   PROTEIN 50* Negative 20* 30*   < > Negative   < > 100*   UROBILINOGEN  --   --   --   --   --  0.2  --  0.2   NITRITE Positive* Negative Negative Negative   < > Negative   < > Negative   LEUKEST Large* Moderate* Negative Small*   < > Negative   < > Small*   RBCU 2 <1 <1 151*   < > None Seen   < >  --    WBCU 13* 6* <1 27*   < > 0-5   < >  --     < > = values in this interval not displayed.     Recent Labs   Lab Test 02/09/21  0922 02/02/21  0920 08/04/20  0905 04/29/20  0850 02/03/20  1035 12/03/19  1215 09/03/19  0940   UTPG 0.28* 0.38* Unable to calculate due to low value Unable to calculate due to low value 0.13 0.15 0.24*     PTH  Recent Labs   Lab Test 04/29/23  0552 04/28/23  1025 02/06/23  1023 01/18/23  0949 04/01/22  1000 03/03/21  0912 08/04/20  0905 12/03/19  1112 08/09/19  0559   PTHI 3* 13* 59 79* 365* 251* 229* 210* 358*     Iron Studies  Recent Labs   Lab Test 07/31/23  1017 05/22/23  1023 05/22/23  1022 04/25/23  1035 07/13/22  1220 08/04/20  0905 02/17/20  0850 12/03/19  1112 09/03/19  0944 08/30/19  1700 08/12/19  0619 01/20/19  0603 12/29/16  1338   IRON 94  --  80 49 51 64 95 47 87 138 96 43 137     --  195* 181* 217* 198* 225* 200* 254 270 162*   --  228*   IRONSAT 35  --  41 27 24 32 42 23 34 51* 59*  --  60*   MONET 1,525* 1,618*  --  1,770* 1,111* 1,065* 1,139* 1,003* 1,535* 1,746*  --  1,780* 1,039*       IMAGING:  All imaging studies reviewed by me.     CHRIS OROZCO MD

## 2023-08-06 NOTE — PLAN OF CARE
Goal Outcome Evaluation:       Patient's vital signs are stable, eating and drinking. Patient has been feeling cramping and the urge to urinate, she was able to void while having nephrostomy. Cramping improved after the void. FHR and uterine activity see flow sheet. Continue with plan of care.

## 2023-08-07 ENCOUNTER — APPOINTMENT (OUTPATIENT)
Dept: ULTRASOUND IMAGING | Facility: CLINIC | Age: 33
End: 2023-08-07
Payer: MEDICARE

## 2023-08-07 LAB
ANION GAP SERPL CALCULATED.3IONS-SCNC: 11 MMOL/L (ref 7–15)
ANION GAP SERPL CALCULATED.3IONS-SCNC: 9 MMOL/L (ref 7–15)
ATRIAL RATE - MUSE: 78 BPM
BUN SERPL-MCNC: 26.4 MG/DL (ref 6–20)
BUN SERPL-MCNC: 28.3 MG/DL (ref 6–20)
CALCIUM SERPL-MCNC: 8.4 MG/DL (ref 8.6–10)
CALCIUM SERPL-MCNC: 8.8 MG/DL (ref 8.6–10)
CHLORIDE SERPL-SCNC: 105 MMOL/L (ref 98–107)
CHLORIDE SERPL-SCNC: 105 MMOL/L (ref 98–107)
CREAT SERPL-MCNC: 1.25 MG/DL (ref 0.51–0.95)
CREAT SERPL-MCNC: 1.26 MG/DL (ref 0.51–0.95)
DEPRECATED HCO3 PLAS-SCNC: 22 MMOL/L (ref 22–29)
DEPRECATED HCO3 PLAS-SCNC: 24 MMOL/L (ref 22–29)
DIASTOLIC BLOOD PRESSURE - MUSE: NORMAL MMHG
GFR SERPL CREATININE-BSD FRML MDRD: 59 ML/MIN/1.73M2
GFR SERPL CREATININE-BSD FRML MDRD: 59 ML/MIN/1.73M2
GLUCOSE SERPL-MCNC: 132 MG/DL (ref 70–99)
GLUCOSE SERPL-MCNC: 79 MG/DL (ref 70–99)
INTERPRETATION ECG - MUSE: NORMAL
P AXIS - MUSE: 83 DEGREES
POTASSIUM SERPL-SCNC: 4.8 MMOL/L (ref 3.4–5.3)
POTASSIUM SERPL-SCNC: 5.2 MMOL/L (ref 3.4–5.3)
PR INTERVAL - MUSE: 150 MS
QRS DURATION - MUSE: 104 MS
QT - MUSE: 404 MS
QTC - MUSE: 460 MS
R AXIS - MUSE: 150 DEGREES
SODIUM SERPL-SCNC: 138 MMOL/L (ref 136–145)
SODIUM SERPL-SCNC: 138 MMOL/L (ref 136–145)
SYSTOLIC BLOOD PRESSURE - MUSE: NORMAL MMHG
T AXIS - MUSE: 56 DEGREES
VENTRICULAR RATE- MUSE: 78 BPM

## 2023-08-07 PROCEDURE — 80048 BASIC METABOLIC PNL TOTAL CA: CPT | Performed by: STUDENT IN AN ORGANIZED HEALTH CARE EDUCATION/TRAINING PROGRAM

## 2023-08-07 PROCEDURE — 99232 SBSQ HOSP IP/OBS MODERATE 35: CPT | Mod: FS | Performed by: PHYSICIAN ASSISTANT

## 2023-08-07 PROCEDURE — 250N000012 HC RX MED GY IP 250 OP 636 PS 637

## 2023-08-07 PROCEDURE — 250N000012 HC RX MED GY IP 250 OP 636 PS 637: Performed by: STUDENT IN AN ORGANIZED HEALTH CARE EDUCATION/TRAINING PROGRAM

## 2023-08-07 PROCEDURE — 250N000013 HC RX MED GY IP 250 OP 250 PS 637

## 2023-08-07 PROCEDURE — 250N000013 HC RX MED GY IP 250 OP 250 PS 637: Performed by: STUDENT IN AN ORGANIZED HEALTH CARE EDUCATION/TRAINING PROGRAM

## 2023-08-07 PROCEDURE — 76818 FETAL BIOPHYS PROFILE W/NST: CPT | Mod: 26 | Performed by: OBSTETRICS & GYNECOLOGY

## 2023-08-07 PROCEDURE — 120N000002 HC R&B MED SURG/OB UMMC

## 2023-08-07 PROCEDURE — 76819 FETAL BIOPHYS PROFIL W/O NST: CPT

## 2023-08-07 PROCEDURE — 36415 COLL VENOUS BLD VENIPUNCTURE: CPT | Performed by: STUDENT IN AN ORGANIZED HEALTH CARE EDUCATION/TRAINING PROGRAM

## 2023-08-07 PROCEDURE — 99233 SBSQ HOSP IP/OBS HIGH 50: CPT | Mod: 25 | Performed by: OBSTETRICS & GYNECOLOGY

## 2023-08-07 PROCEDURE — 250N000011 HC RX IP 250 OP 636: Mod: JZ | Performed by: OBSTETRICS & GYNECOLOGY

## 2023-08-07 RX ORDER — CALCIUM CARBONATE 1250 MG/5ML
625 SUSPENSION ORAL DAILY PRN
Status: DISCONTINUED | OUTPATIENT
Start: 2023-08-07 | End: 2023-08-15 | Stop reason: HOSPADM

## 2023-08-07 RX ADMIN — ASPIRIN 81 MG CHEWABLE TABLET 81 MG: 81 TABLET CHEWABLE at 08:17

## 2023-08-07 RX ADMIN — URSODIOL 300 MG: 300 CAPSULE ORAL at 13:19

## 2023-08-07 RX ADMIN — URSODIOL 300 MG: 300 CAPSULE ORAL at 08:18

## 2023-08-07 RX ADMIN — TACROLIMUS 4 MG: 1 CAPSULE ORAL at 22:00

## 2023-08-07 RX ADMIN — SODIUM BICARBONATE 650 MG TABLET 1300 MG: at 20:15

## 2023-08-07 RX ADMIN — SODIUM BICARBONATE 650 MG TABLET 1300 MG: at 13:19

## 2023-08-07 RX ADMIN — Medication 50 MCG: at 08:14

## 2023-08-07 RX ADMIN — POLYETHYLENE GLYCOL 3350 17 G: 17 POWDER, FOR SOLUTION ORAL at 16:51

## 2023-08-07 RX ADMIN — URSODIOL 300 MG: 300 CAPSULE ORAL at 20:15

## 2023-08-07 RX ADMIN — DOCUSATE SODIUM 100 MG: 100 CAPSULE, LIQUID FILLED ORAL at 20:15

## 2023-08-07 RX ADMIN — TACROLIMUS 4 MG: 1 CAPSULE ORAL at 10:07

## 2023-08-07 RX ADMIN — LEVOTHYROXINE SODIUM 50 MCG: 25 TABLET ORAL at 08:15

## 2023-08-07 RX ADMIN — NIFEDIPINE 60 MG: 30 TABLET, FILM COATED, EXTENDED RELEASE ORAL at 08:18

## 2023-08-07 RX ADMIN — MAGNESIUM OXIDE TAB 400 MG (241.3 MG ELEMENTAL MG) 800 MG: 400 (241.3 MG) TAB at 08:17

## 2023-08-07 RX ADMIN — DOCUSATE SODIUM 100 MG: 100 CAPSULE, LIQUID FILLED ORAL at 08:18

## 2023-08-07 RX ADMIN — CALCIUM CARBONATE 625 MG: 1250 SUSPENSION ORAL at 22:34

## 2023-08-07 RX ADMIN — CEFTAZIDIME 1 G: 1 INJECTION, POWDER, FOR SOLUTION INTRAMUSCULAR; INTRAVENOUS at 08:22

## 2023-08-07 RX ADMIN — CEFTAZIDIME 1 G: 1 INJECTION, POWDER, FOR SOLUTION INTRAMUSCULAR; INTRAVENOUS at 00:20

## 2023-08-07 RX ADMIN — CEFTAZIDIME 1 G: 1 INJECTION, POWDER, FOR SOLUTION INTRAMUSCULAR; INTRAVENOUS at 16:15

## 2023-08-07 RX ADMIN — PRENATAL VITAMINS-IRON FUMARATE 27 MG IRON-FOLIC ACID 0.8 MG TABLET 1 TABLET: at 20:15

## 2023-08-07 RX ADMIN — SODIUM BICARBONATE 650 MG TABLET 1300 MG: at 08:19

## 2023-08-07 RX ADMIN — CYANOCOBALAMIN TAB 1000 MCG 1000 MCG: 1000 TAB at 08:20

## 2023-08-07 RX ADMIN — AZATHIOPRINE 150 MG: 50 TABLET ORAL at 22:00

## 2023-08-07 ASSESSMENT — ACTIVITIES OF DAILY LIVING (ADL)
ADLS_ACUITY_SCORE: 20

## 2023-08-07 NOTE — PROGRESS NOTES
Pt had a mild range bp provider notified all other VSS. Pt has no complaints of pain, leaking of fluid, cramping, headache, vision changes, and epigastric pain. Pt reports noticing blood in stool and scant amount of blood on toilet paper, provider made aware. See flowsheet for EFM interpretation. Pt has no other complaints at this time.

## 2023-08-07 NOTE — PROGRESS NOTES
Pt rested well overnight. VSS. Pt mentioned seeing more edema than usual the last few days- MD notified. No reports of headache, vision changes, LOF, vaginal bleeding or R epigastric pain. See flowsheet for EFM interpretation. Some intermittent cramping, able to relax through them.

## 2023-08-07 NOTE — PLAN OF CARE
Goal Outcome Evaluation:       Pt in stable condition this shift. Had one episode of bright red bleeding this morning with BM, none since, Dr Montiel notified during MFM Rounds.  BPs elevated with two systolic values in the 140s today, Dr Goel notified at 1725, no new orders at this time.  Pt denies any headache, vision change, SOB, epigastric pain.  Pt stating understanding and agreement with plan to remain inpatient tonight and possible tomorrow night while she finishes her IV antibiotic.  Patient ambulating in halls, doing laundry intermittently throughout the day.  Plan to continue with current care plan overnight.

## 2023-08-07 NOTE — PROGRESS NOTES
Nephrology Progress Note  08/07/2023       History of Present Illness  Mona Wagner is a 32 year old female past medical history of ESKD of unclear etiology, status post DDKT in 2019, CKD, high risk pregnancy admitted for increased contractions.  On admission noted to have creatinine elevation from baseline of 1.3-1 0.4 to 1.6 and also noted to have hyperkalemia at potassium of 5.5, repeat was 6.1.  There is associated metabolic acidosis as well.  Patient endorsed having pain in her right lower quadrant that is radiating into her groin and contractions at the same time. UA was dirty and culture was positive. Denied other significant GI issues lately. Was able to tolerate PO but pain made her not to be hydrated well encough in the past 24 to 48 hours. Endorses compliant with her medications.     Interval History :   Provider and nursing notes from past 24 hour reviewed. She is overall doing okay. She does feel her hands and feet that may be related to nifedipine. She has some mild pruritus. She noted an episode of blood in her stool overnight with bright red blood in the toilet as well. She has no fevers/chills. She has no shortness of breath.       ASSESSMENT/RECOMMENDATIONS    # DDKT: Trend up              - Baseline Creatinine: ~ 1.2-1.4 with pregnancy, Pre-pregnancy was 0.8-1.1               - Proteinuria: Mild (0.5-1.0 grams), stated to trend up since July 2023              - Date DSA Last Checked: -Sep/2022      Latest DSA: No, cPRA : 25%              - BK Viremia: No              - Kidney Tx Biopsy: Sep 17, 2019; Result: No diagnostic evidence of acute rejection.      CAMERON likely 2/2 UTI plus Pre-renal element secondary to decreased PO intake and urinary obstruction  Pt with baseline creatinine was 1.1, trended up to 1.7 mg/dl then now down to 1.2.  Urine culture growing Stenotrophomonas maltophila and being treated with ceftazidime   US transplant with no signs of obstruction     # Immunosuppression: Tacrolimus  immediate release (goal 4-6) and Azathioprine (dose 150 mg daily)              - Patient is in an immunosuppressed state and will continue to monitor for efficacy and toxicity of immunosuppression medications.              - Changes: Not at this time, tacrolimus level pending - due tomorrow     # Infection Prophylaxis:                               Last CD4 Level: 201 (Jul/2020)   - PJP: None       # Hypertension: Controlled;   Goal BP: < 130/80              - Volume status: Euvolemic, pt was on florinef prior to pregnancy, encouraged good hydration              - Changes: switched to long acting nifedipine   - BP's mostly 120s-130's/60s-70s     # Anemia in Chronic Renal Disease: Hgb: Stable, low      CHARLINE: No              - Iron studies: Replete     # Mineral Bone Disorder:   - Teriary renal hyperparathyroidism :Patient is s/p parathyroidectomy 4/2023 with 3.5 glands removed ; PTH level:  low <15         On treatment: None  - Vitamin D; level: Low        On supplement: Yes  - Calcium; level: Normal        On supplement: No     # Electrolytes:   - K 5.2, Na 138, Bicarb 24  - history of hyperkalemia, likely 2/2 CAMERON, ? Obstruction and/or tacrolimu  - had one dose of lokelma  - continue low potassium diet  - treat with lokelma if K > 6  - history of metabolic acidosis, bicarb 24 now on sodium bicarb supplement        # History of of hyponatremia: improved, Na 138              -Slightly low serum sodium level common in pregnancy due to increased total body water and increased ADH     # Ureteral Stenosis/Hydronephrosis: Patient with moderate hydronephrosis of kidney transplant and developed CAMERON.  She underwent PNT 6/9/23 with repeat kidney transplant ultrasound 6/11 still showing moderate hydronephrosis suggesting this may be a functional hydronephrosis from pregnancy.  However, patient does report resolution of edema and dyspnea, as well as apparent post-obstructive diuresis.  She has a previous h/o ureteral stenosis  with ureteral stent removed 2/2021.  Previous kidney transplant ultrasound 7/2021 also showed moderate hydronephrosis unchanged with voiding. Hydronephrosis was unchanged on 7/6/22 abdominal CT.                -Most recent U/S 7/5/23 with improvement in hydronephrosis with PNT in place              -now making some urine through urethra              -she will continue to have PCN so long as she is draining through it and likely for 4 weeks after delivery   - Neph tube exchange planned on 8/14     # Pregnancy: Patient is 33 weeks pregnant. Followed by MFM.    Recommendations were communicated to primary team via this note.     Seen and discussed with Dr. Oliver.     ROGER STRAUSS, PANATALIIA     Attestation:  I, Dr. Briana Oliver, saw and evaluated Mona Wagner today as pat of a shared visit. I have reviewed and discussed with the NPP their history, physical exam and plan.     I personally reviewed major complaints, physical findings, investigations, medications, lab values, vital signs, I/O's and the overall management plan.      I personally performed a history, exam and agree with the assessment and plan as written.       Briana Oliver MD MS FNKF August 7, 2023                Review of Systems:   A 4 point review of systems was negative except as noted above.      Physical Exam:   I/O last 3 completed shifts:  In: 2090 [P.O.:2090]  Out: 2250 [Urine:2250]  /70  Pulse 85  Resp 16 SpO2 99%  Wt 80.3 kg  GENERAL APPEARANCE: alert and no distress  EYES:  no scleral icterus, pupils equal  PULM: no respiratory distress   CV: regular rhythm, normal rate, no rub      -edema none   MS: no evidence of inflammation in joints, no muscle tenderness  NEURO: mentation intact and speech normal   Access left AVF    Labs:   All labs reviewed by me  Electrolytes/Renal -   Recent Labs   Lab Test 08/07/23  0747 08/06/23  1847 08/06/23  0613 07/17/23  1024 07/13/23  1015 07/02/23  1704 06/29/23  1300 06/22/23  1007 06/19/23  1028  06/16/23  0723 06/15/23  1009 06/12/23  0640 06/12/23  0235    137 139   < > 135*   < >  --    < > 133*   < > 133*   < > 133*   POTASSIUM 5.2 4.3 4.3   < > 5.0   < >  --    < > 5.2   < > 4.8   < > 5.7*   CHLORIDE 105 103 105   < > 104   < >  --    < > 103   < > 100   < > 103   CO2 24 23 22   < > 21*   < >  --    < > 19*   < > 20*   < > 17*   BUN 28.3* 30.7* 28.9*   < > 29.8*   < >  --    < > 34.4*   < > 27.6*   < > 26.1*   CR 1.25* 1.33* 1.27*   < > 1.38*   < >  --    < > 1.30*   < > 1.25*   < > 1.30*   GLC 79 144* 123*   < > 94   < >  --    < > 89   < > 138*   < > 119*   SHAMIR 8.8 8.5* 8.6   < > 9.3   < >  --    < > 9.7   < > 9.6   < > 9.8   MAG  --   --   --   --  2.3  --  2.0  --  1.7   < > 1.3*   < > 1.6*   PHOS  --   --   --   --  3.3  --   --   --   --   --  3.6  --  2.6    < > = values in this interval not displayed.       CBC -   Recent Labs   Lab Test 08/04/23 0706 08/03/23 1804 08/03/23  1014   WBC 9.3 7.9 8.4   HGB 11.3* 10.6* 11.0*    261 253       LFTs -   Recent Labs   Lab Test 08/04/23 0706 08/03/23  1804 08/03/23  1014 07/31/23  1017   ALKPHOS  --  149* 151* 139*   BILITOTAL  --  1.4* 1.5* 1.4*   ALT 40 36 37 29   AST 42 32 34 35   PROTTOTAL  --  7.1 7.3 7.5   ALBUMIN  --  3.5 3.7 3.8       Iron Panel -   Recent Labs   Lab Test 07/31/23  1017 05/22/23  1023 05/22/23  1022 04/25/23  1035   IRON 94  --  80 49   IRONSAT 35  --  41 27   MONET 1,525*   < >  --  1,770*    < > = values in this interval not displayed.         Imaging:  Reviewed      Current Medications:   aspirin  81 mg Oral Daily    azaTHIOprine  150 mg Oral QPM    cefTAZidime  1 g Intravenous Q8H    cyanocobalamin  1,000 mcg Oral QAM    docusate sodium  100 mg Oral BID    lactated ringers  250 mL Intravenous Once    levothyroxine  50 mcg Oral Daily    magnesium oxide  800 mg Oral QAM    NIFEdipine ER OSMOTIC  60 mg Oral Daily    prenatal multivitamin w/iron  1 tablet Oral QPM    sodium bicarbonate  1,300 mg Oral TID    sodium  chloride (PF)  3 mL Intracatheter Q8H    tacrolimus  4 mg Oral BID    ursodiol  300 mg Oral TID    vitamin D3  50 mcg Oral QAM      - MEDICATION INSTRUCTIONS -      sodium bicarbonate 75 mEq in NaCl 0.45 % 1,075 mL infusion 100 mL/hr at 08/04/23 8538     ROGER STRAUSS, PAConchaC

## 2023-08-07 NOTE — PROGRESS NOTES
"[facility] Antepartum Progress Note    Subjective:   Patient is doing well this morning. She was able to get some sleep over night. She reports that she is feeling \"maycol trinh\" type contractions, though inconsistently. She reports having an episode of blood stools this morning further stating she noticed blood in the water and a few drops of bright red blood after defecating this morning. Denies any dark/tar colored stools or blood intermixed with stools. She feels like she has noticed new mild swelling to her hands and feet bilaterally - feels like this may be due to the nifedipine. She also continues to have mild itching, and wonders how long it will take for Actigall to start working for this.  Denies any paresthesias, chest pain, SOB, nausea, or vomiting. Mona has several questions this morning regarding plan of care/plan for discharge home.     Objective:  Patient Vitals for the past 24 hrs:   BP Temp Temp src Resp Weight   08/07/23 1210 127/70 97.9  F (36.6  C) Oral 16 --   08/07/23 0805 (!) 146/79 98  F (36.7  C) Oral 18 --   08/07/23 0604 -- -- -- -- 80.3 kg (177 lb 1.6 oz)   08/07/23 0407 139/80 98.5  F (36.9  C) Oral 18 --   08/07/23 0018 128/66 98.9  F (37.2  C) Oral 17 --   08/06/23 2009 132/82 98  F (36.7  C) Oral 16 --   08/06/23 1537 (!) 143/82 98.1  F (36.7  C) Oral 17 --       I/Os:   Intake/Output Summary (Last 24 hours) at 8/7/2023 1203  Last data filed at 8/6/2023 2200  Gross per 24 hour   Intake 2160 ml   Output 1900 ml   Net 260 ml     Gen: Resting comfortably in bed, NAD. Family in room.  CV: Regular rate, well perfused  Resp: Non-labored breathing on room air  Abd: Gravid, soft between contractions, non-tender, non-distended  SVE: deferred    FHT: Baseline 135 bpm, moderate variability, accelerations present, no decelerations  [place]: 0-1 contractions in 10 minutes    Recent Imaging:     BPP 8/7: 8/8  Renal US 8/4. No abscess or renal fluid around graft. Few bubbles in location of nephrostomy " tube.       Recent Labs:    Latest Reference Range & Units 23 07:47   Sodium 136 - 145 mmol/L 138   Potassium 3.4 - 5.3 mmol/L 5.2   Chloride 98 - 107 mmol/L 105   Carbon Dioxide (CO2) 22 - 29 mmol/L 24   Urea Nitrogen 6.0 - 20.0 mg/dL 28.3 (H)   Creatinine 0.51 - 0.95 mg/dL 1.25 (H)   GFR Estimate >60 mL/min/1.73m2 59 (L)   Calcium 8.6 - 10.0 mg/dL 8.8   Anion Gap 7 - 15 mmol/L 9   Glucose 70 - 99 mg/dL 79   (H): Data is abnormally high  (L): Data is abnormally low    Assessment/ Plan:   Mona Wagner is a 32 year old  @ 33w3d by LMP c/w 8w4d US with a history of IgA nephropathy s/p renal transplant () with 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 Patient remains afebrile, is continued on IV antibiotics, with evidence of improvement in her infection. Contractions improved. She also has new diagnosis of intrahepatic cholestasis of pregnancy and was started on Actigall.     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 She has been afebrile throughout admission. VSS. No leukocytosis or other clinical evidence of pyelonephritis at present. Possible colonization of PNT. Plan to continue ceftazidime based on sensitivities that have now resulted. Creatinine has decreased since admission and is now 1.27 this morning (recent baseline 1.2-1.3). Earlier in this pregnancy, she was admitted from -6/10 after elevated BP in clinic, found to have allograft hydronephrosis and underwent PNT placement. She was then readmitted - with hyperkalemia in the setting of post-ATN diuresis, with course complicated by  contractions for which she received tocolysis, betamethasone, IV magnesium, and IV  penicillin. She was readmitted - with E. Coli UTI, treated with IV ceftriaxone and discharged on Keflex prophylaxis. Patient follows with Dr. Mcintyre of transplant nephrology.   - Nephrology following, appreciate recommendations  - Continue on Ceftazidime q12 - Plan is for total 7 day course of antibiotics. Given complexity of attempting to   Arrange this as an outpatient, discussed with patient at length plan for inpatient admission for 2 more days to finish this course of treatment and for close monitoring of blood pressures (as below). Patient is amendable to this plan.   - PNT in place since  (exchange scheduled for ); patient has had increased urination from bladder since admission with increased hydration.  - Continue PTA azathioprine, tacrolimus  - Tacrolimus level therapeutic; additional monitoring per nephrology    Hyperkalemia-resolved  - EKG  with no acute changes. Per nephrology, no need for telemetry at present.   - Lokelma 30 mg PO  - K today 5.2, continue daily monitoring and repeat Lokelma per nephrology.     labor versus  contractions secondary to hyperkalemia-now improved  Cervical exam largely stable 1/90/-2 (previously 1.5-2/50/-1 on admission and also previous admission). Patient previously received a course of steroids 6/10- during prior admission with  contractions, and completed rescue course  and .  - s/p nifedipine tocolysis  - S/p BMZ 6/10-; and rescue course  and   - Wet prep negative, GC/CT neg  - GBS negative     Superimposed pre-eclampsia without severe features  On review of patient chart, patient meets criteria for SI pre-eclampsia without severe features due to elevated UPC. Hypertension had resolved with transplant. Prior admissions in this pregnancy with severe range BPs in the setting of renal dysfunction/allograft hydronephrosis. Persistent mild range and non-sustained SR BP with pain and contractions, now improved with  nifedipine.   - Labs notable for elevated Cr 0.82 (increased from 0.5 two weeks ago).  - D#2 Nifedipine 60mg.  Increase as indicated to goal SBP < 130 and DBP < 80.  - Continue to closely monitor BP  - mild edema to bilateral hands and feet bilaterally. Possibly due to nifedipine. Given this is mild will continue to monitor this closely. Should this become exacerbated can consider changing medication. Do not feel like this is a sign of worsening pre-eclampsia at this time  - otherwise asymptomatic  - twice weekly HELLP labs    Hypothyroidism  Secondary hyperparathyroidism with hypercalcemia   S/p resection of parathyroid glands on . Follows with endocrinology.  - Continue levothyroxine 50 mcg QD  - 23 TSH 2.51; today 0.83, within the range of recent TSH measurements for patient in the last 4 months     History of DVT  History of upper extremity DVT in  associated with dialysis catheter. Assessment for inherited thrombophilias including Factor V Leiden and prothrombin gene mutation negative. APLS testing in this pregnancy notable only for weakly positive cardiolipin IgM Ab.   - Consider DVT prophylaxis if prolonged admission and no concern for evolving  labor      History of bacterial endocarditis  No acute issues. Last echo on 3/20 with LV hyperkinetic and LVEF >70%, RV function normal.       FATOUMATA  - Continue PTA CPAP      FGR, resolved  Last Martin  EFW 13%, AC 32%  - Serial growth US q3 weeks, next 8/10  - Weekly NST with UAR, next     Routine PNC:  - Prenatal labs:               Rh: +  antibody: neg               HepB/HIV/RPR/HepC: nonreactive               GC/CT: neg               Rubella: non-immune  Varicella: non-immune               GCT: elevated; 3 hr GTT: wnl               Pap: NIL HPV neg  - Immunizations: s/p Flu and Covid. Due for Tdap. Will offer during admission   - Genetic screening: NIPT low-risk. Low-risk NT.     Rectal bleeding  - suspected due to hemorrhoids, differential  diagnosis includes infection, such as c.diff.  Given lack of other symptoms, will continue with expectant management at this time.  - CBC in AM    Cholestasis of Pregnancy  Bile Acids 47 (8/3/2023), total bili 1.4(increaed), AST/ALT 32/36  -started Actigall 300 mg TID last evening  -continue vistaril as needed  -continue TID FHR monitoring     Patient seen by and care managed under the supervision of Dr. Clare Castro DO, MS  Obstetrics, Gynecology & Women's Health   Resident, PGY-3  2023 12:22 PM      Physician Attestation   I saw this patient with the resident and agree with the resident/fellow's findings and plan of care as documented in the note.      Key findings: In summary, Mona Wagner is a  at 33w3d currently admitted for suspected urinary tract infection in context of history of renal transplant and PNT due to obstruction due to pregnancy,  Clinically improving and given Mona's multiple allergies and concurrent conditions in pregnancy, will complete 7 day course of antibiotics inpatient.  Continue close monitoring of BP as Mona also meets criteria for superimposed preeclampsia without severe features as well.      50 MINUTES SPENT BY ME on the date of service doing chart review, history, exam, documentation & further activities per the note.    I have personally reviewed the following data over the past 24 hrs:    N/A  \   N/A   / N/A     138 105 28.3 (H) /  79   5.2 24 1.25 (H) \         Tiffany Montiel MD  Date of Service (when I saw the patient): 23

## 2023-08-08 LAB
ABO/RH(D): NORMAL
ALBUMIN SERPL BCG-MCNC: 3.1 G/DL (ref 3.5–5.2)
ALP SERPL-CCNC: 130 U/L (ref 35–104)
ALT SERPL W P-5'-P-CCNC: 34 U/L (ref 0–50)
ANION GAP SERPL CALCULATED.3IONS-SCNC: 10 MMOL/L (ref 7–15)
ANION GAP SERPL CALCULATED.3IONS-SCNC: 13 MMOL/L (ref 7–15)
ANTIBODY SCREEN: NEGATIVE
AST SERPL W P-5'-P-CCNC: 37 U/L (ref 0–45)
BILIRUB SERPL-MCNC: 1.1 MG/DL
BUN SERPL-MCNC: 24.9 MG/DL (ref 6–20)
BUN SERPL-MCNC: 25.6 MG/DL (ref 6–20)
CALCIUM SERPL-MCNC: 8.8 MG/DL (ref 8.6–10)
CALCIUM SERPL-MCNC: 8.9 MG/DL (ref 8.6–10)
CHLORIDE SERPL-SCNC: 104 MMOL/L (ref 98–107)
CHLORIDE SERPL-SCNC: 106 MMOL/L (ref 98–107)
CREAT SERPL-MCNC: 1.16 MG/DL (ref 0.51–0.95)
CREAT SERPL-MCNC: 1.35 MG/DL (ref 0.51–0.95)
DEPRECATED HCO3 PLAS-SCNC: 19 MMOL/L (ref 22–29)
DEPRECATED HCO3 PLAS-SCNC: 21 MMOL/L (ref 22–29)
ERYTHROCYTE [DISTWIDTH] IN BLOOD BY AUTOMATED COUNT: 14.6 % (ref 10–15)
ERYTHROCYTE [DISTWIDTH] IN BLOOD BY AUTOMATED COUNT: 14.6 % (ref 10–15)
GFR SERPL CREATININE-BSD FRML MDRD: 54 ML/MIN/1.73M2
GFR SERPL CREATININE-BSD FRML MDRD: 65 ML/MIN/1.73M2
GLUCOSE SERPL-MCNC: 111 MG/DL (ref 70–99)
GLUCOSE SERPL-MCNC: 85 MG/DL (ref 70–99)
HCT VFR BLD AUTO: 31.3 % (ref 35–47)
HCT VFR BLD AUTO: 32 % (ref 35–47)
HGB BLD-MCNC: 10.1 G/DL (ref 11.7–15.7)
HGB BLD-MCNC: 10.4 G/DL (ref 11.7–15.7)
HOLD SPECIMEN: NORMAL
MCH RBC QN AUTO: 31 PG (ref 26.5–33)
MCH RBC QN AUTO: 31.3 PG (ref 26.5–33)
MCHC RBC AUTO-ENTMCNC: 32.3 G/DL (ref 31.5–36.5)
MCHC RBC AUTO-ENTMCNC: 32.5 G/DL (ref 31.5–36.5)
MCV RBC AUTO: 96 FL (ref 78–100)
MCV RBC AUTO: 96 FL (ref 78–100)
PLATELET # BLD AUTO: 201 10E3/UL (ref 150–450)
PLATELET # BLD AUTO: 203 10E3/UL (ref 150–450)
POTASSIUM SERPL-SCNC: 5.1 MMOL/L (ref 3.4–5.3)
POTASSIUM SERPL-SCNC: 5.2 MMOL/L (ref 3.4–5.3)
PROT SERPL-MCNC: 6.4 G/DL (ref 6.4–8.3)
RBC # BLD AUTO: 3.26 10E6/UL (ref 3.8–5.2)
RBC # BLD AUTO: 3.32 10E6/UL (ref 3.8–5.2)
SODIUM SERPL-SCNC: 135 MMOL/L (ref 136–145)
SODIUM SERPL-SCNC: 138 MMOL/L (ref 136–145)
SPECIMEN EXPIRATION DATE: NORMAL
TACROLIMUS BLD-MCNC: 5.9 UG/L (ref 5–15)
TME LAST DOSE: NORMAL H
TME LAST DOSE: NORMAL H
WBC # BLD AUTO: 10.8 10E3/UL (ref 4–11)
WBC # BLD AUTO: 9 10E3/UL (ref 4–11)

## 2023-08-08 PROCEDURE — 250N000012 HC RX MED GY IP 250 OP 636 PS 637: Performed by: STUDENT IN AN ORGANIZED HEALTH CARE EDUCATION/TRAINING PROGRAM

## 2023-08-08 PROCEDURE — 99233 SBSQ HOSP IP/OBS HIGH 50: CPT | Mod: 25 | Performed by: OBSTETRICS & GYNECOLOGY

## 2023-08-08 PROCEDURE — 36415 COLL VENOUS BLD VENIPUNCTURE: CPT

## 2023-08-08 PROCEDURE — 250N000013 HC RX MED GY IP 250 OP 250 PS 637

## 2023-08-08 PROCEDURE — 36415 COLL VENOUS BLD VENIPUNCTURE: CPT | Performed by: OBSTETRICS & GYNECOLOGY

## 2023-08-08 PROCEDURE — 76815 OB US LIMITED FETUS(S): CPT | Mod: 26 | Performed by: OBSTETRICS & GYNECOLOGY

## 2023-08-08 PROCEDURE — 250N000013 HC RX MED GY IP 250 OP 250 PS 637: Performed by: OBSTETRICS & GYNECOLOGY

## 2023-08-08 PROCEDURE — 86901 BLOOD TYPING SEROLOGIC RH(D): CPT

## 2023-08-08 PROCEDURE — 80197 ASSAY OF TACROLIMUS: CPT | Performed by: OBSTETRICS & GYNECOLOGY

## 2023-08-08 PROCEDURE — 85027 COMPLETE CBC AUTOMATED: CPT | Performed by: OBSTETRICS & GYNECOLOGY

## 2023-08-08 PROCEDURE — 250N000011 HC RX IP 250 OP 636: Mod: JZ | Performed by: OBSTETRICS & GYNECOLOGY

## 2023-08-08 PROCEDURE — 80053 COMPREHEN METABOLIC PANEL: CPT | Performed by: OBSTETRICS & GYNECOLOGY

## 2023-08-08 PROCEDURE — 120N000002 HC R&B MED SURG/OB UMMC

## 2023-08-08 PROCEDURE — 250N000012 HC RX MED GY IP 250 OP 636 PS 637

## 2023-08-08 PROCEDURE — 99232 SBSQ HOSP IP/OBS MODERATE 35: CPT | Mod: FS | Performed by: PHYSICIAN ASSISTANT

## 2023-08-08 PROCEDURE — 59025 FETAL NON-STRESS TEST: CPT | Mod: 26 | Performed by: OBSTETRICS & GYNECOLOGY

## 2023-08-08 PROCEDURE — 258N000003 HC RX IP 258 OP 636

## 2023-08-08 PROCEDURE — 250N000013 HC RX MED GY IP 250 OP 250 PS 637: Performed by: STUDENT IN AN ORGANIZED HEALTH CARE EDUCATION/TRAINING PROGRAM

## 2023-08-08 PROCEDURE — 85027 COMPLETE CBC AUTOMATED: CPT

## 2023-08-08 RX ORDER — NIFEDIPINE 30 MG/1
90 TABLET, EXTENDED RELEASE ORAL DAILY
Status: DISCONTINUED | OUTPATIENT
Start: 2023-08-09 | End: 2023-08-10

## 2023-08-08 RX ORDER — HYDROXYZINE HYDROCHLORIDE 50 MG/1
50-100 TABLET, FILM COATED ORAL ONCE
Status: COMPLETED | OUTPATIENT
Start: 2023-08-08 | End: 2023-08-08

## 2023-08-08 RX ORDER — KETOROLAC TROMETHAMINE 30 MG/ML
30 INJECTION, SOLUTION INTRAMUSCULAR; INTRAVENOUS
Status: DISCONTINUED | OUTPATIENT
Start: 2023-08-08 | End: 2023-08-09

## 2023-08-08 RX ORDER — OXYTOCIN 10 [USP'U]/ML
10 INJECTION, SOLUTION INTRAMUSCULAR; INTRAVENOUS
Status: DISCONTINUED | OUTPATIENT
Start: 2023-08-08 | End: 2023-08-09

## 2023-08-08 RX ORDER — CARBOPROST TROMETHAMINE 250 UG/ML
250 INJECTION, SOLUTION INTRAMUSCULAR
Status: DISCONTINUED | OUTPATIENT
Start: 2023-08-08 | End: 2023-08-09

## 2023-08-08 RX ORDER — METOCLOPRAMIDE HYDROCHLORIDE 5 MG/ML
10 INJECTION INTRAMUSCULAR; INTRAVENOUS EVERY 6 HOURS PRN
Status: DISCONTINUED | OUTPATIENT
Start: 2023-08-08 | End: 2023-08-09

## 2023-08-08 RX ORDER — NALOXONE HYDROCHLORIDE 0.4 MG/ML
0.2 INJECTION, SOLUTION INTRAMUSCULAR; INTRAVENOUS; SUBCUTANEOUS
Status: DISCONTINUED | OUTPATIENT
Start: 2023-08-08 | End: 2023-08-09

## 2023-08-08 RX ORDER — CITRIC ACID/SODIUM CITRATE 334-500MG
30 SOLUTION, ORAL ORAL
Status: DISCONTINUED | OUTPATIENT
Start: 2023-08-08 | End: 2023-08-12 | Stop reason: HOSPADM

## 2023-08-08 RX ORDER — IBUPROFEN 800 MG/1
800 TABLET, FILM COATED ORAL
Status: DISCONTINUED | OUTPATIENT
Start: 2023-08-08 | End: 2023-08-09

## 2023-08-08 RX ORDER — SODIUM CHLORIDE 9 MG/ML
INJECTION, SOLUTION INTRAVENOUS
Status: DISCONTINUED
Start: 2023-08-08 | End: 2023-08-09 | Stop reason: HOSPADM

## 2023-08-08 RX ORDER — FENTANYL CITRATE 50 UG/ML
100 INJECTION, SOLUTION INTRAMUSCULAR; INTRAVENOUS
Status: DISCONTINUED | OUTPATIENT
Start: 2023-08-08 | End: 2023-08-09

## 2023-08-08 RX ORDER — CEFTAZIDIME 1 G/1
1 INJECTION, POWDER, FOR SOLUTION INTRAMUSCULAR; INTRAVENOUS EVERY 8 HOURS
Status: DISCONTINUED | OUTPATIENT
Start: 2023-08-08 | End: 2023-08-08

## 2023-08-08 RX ORDER — METHYLERGONOVINE MALEATE 0.2 MG/ML
200 INJECTION INTRAVENOUS
Status: DISCONTINUED | OUTPATIENT
Start: 2023-08-08 | End: 2023-08-09

## 2023-08-08 RX ORDER — ACETAMINOPHEN 325 MG/1
650 TABLET ORAL EVERY 4 HOURS PRN
Status: DISCONTINUED | OUTPATIENT
Start: 2023-08-08 | End: 2023-08-12

## 2023-08-08 RX ORDER — PROCHLORPERAZINE 25 MG
25 SUPPOSITORY, RECTAL RECTAL EVERY 12 HOURS PRN
Status: DISCONTINUED | OUTPATIENT
Start: 2023-08-08 | End: 2023-08-09

## 2023-08-08 RX ORDER — OXYTOCIN/0.9 % SODIUM CHLORIDE 30/500 ML
100-340 PLASTIC BAG, INJECTION (ML) INTRAVENOUS CONTINUOUS PRN
Status: DISCONTINUED | OUTPATIENT
Start: 2023-08-08 | End: 2023-08-09

## 2023-08-08 RX ORDER — MISOPROSTOL 200 UG/1
800 TABLET ORAL
Status: DISCONTINUED | OUTPATIENT
Start: 2023-08-08 | End: 2023-08-09

## 2023-08-08 RX ORDER — CITRIC ACID/SODIUM CITRATE 334-500MG
30 SOLUTION, ORAL ORAL ONCE
Status: COMPLETED | OUTPATIENT
Start: 2023-08-08 | End: 2023-08-08

## 2023-08-08 RX ORDER — CEFTAZIDIME 1 G/1
1 INJECTION, POWDER, FOR SOLUTION INTRAMUSCULAR; INTRAVENOUS EVERY 8 HOURS
Status: DISCONTINUED | OUTPATIENT
Start: 2023-08-08 | End: 2023-08-09

## 2023-08-08 RX ORDER — MISOPROSTOL 200 UG/1
400 TABLET ORAL
Status: DISCONTINUED | OUTPATIENT
Start: 2023-08-08 | End: 2023-08-09

## 2023-08-08 RX ORDER — NALOXONE HYDROCHLORIDE 0.4 MG/ML
0.4 INJECTION, SOLUTION INTRAMUSCULAR; INTRAVENOUS; SUBCUTANEOUS
Status: DISCONTINUED | OUTPATIENT
Start: 2023-08-08 | End: 2023-08-09

## 2023-08-08 RX ORDER — METOCLOPRAMIDE 10 MG/1
10 TABLET ORAL EVERY 6 HOURS PRN
Status: DISCONTINUED | OUTPATIENT
Start: 2023-08-08 | End: 2023-08-09

## 2023-08-08 RX ORDER — OXYTOCIN/0.9 % SODIUM CHLORIDE 30/500 ML
340 PLASTIC BAG, INJECTION (ML) INTRAVENOUS CONTINUOUS PRN
Status: DISCONTINUED | OUTPATIENT
Start: 2023-08-08 | End: 2023-08-09

## 2023-08-08 RX ORDER — ONDANSETRON 2 MG/ML
4 INJECTION INTRAMUSCULAR; INTRAVENOUS EVERY 6 HOURS PRN
Status: DISCONTINUED | OUTPATIENT
Start: 2023-08-08 | End: 2023-08-15 | Stop reason: HOSPADM

## 2023-08-08 RX ORDER — PROCHLORPERAZINE MALEATE 10 MG
10 TABLET ORAL EVERY 6 HOURS PRN
Status: DISCONTINUED | OUTPATIENT
Start: 2023-08-08 | End: 2023-08-09

## 2023-08-08 RX ORDER — TRANEXAMIC ACID 10 MG/ML
1 INJECTION, SOLUTION INTRAVENOUS EVERY 30 MIN PRN
Status: DISCONTINUED | OUTPATIENT
Start: 2023-08-08 | End: 2023-08-09

## 2023-08-08 RX ORDER — ONDANSETRON 4 MG/1
4 TABLET, ORALLY DISINTEGRATING ORAL EVERY 6 HOURS PRN
Status: DISCONTINUED | OUTPATIENT
Start: 2023-08-08 | End: 2023-08-15 | Stop reason: HOSPADM

## 2023-08-08 RX ADMIN — URSODIOL 300 MG: 300 CAPSULE ORAL at 07:41

## 2023-08-08 RX ADMIN — Medication 50 MCG: at 08:39

## 2023-08-08 RX ADMIN — DOCUSATE SODIUM 100 MG: 100 CAPSULE, LIQUID FILLED ORAL at 07:41

## 2023-08-08 RX ADMIN — HYDROXYZINE HYDROCHLORIDE 50 MG: 50 TABLET, FILM COATED ORAL at 23:32

## 2023-08-08 RX ADMIN — URSODIOL 300 MG: 300 CAPSULE ORAL at 19:58

## 2023-08-08 RX ADMIN — SODIUM BICARBONATE 650 MG TABLET 1300 MG: at 14:30

## 2023-08-08 RX ADMIN — AZATHIOPRINE 150 MG: 50 TABLET ORAL at 22:02

## 2023-08-08 RX ADMIN — ACETAMINOPHEN 650 MG: 325 TABLET, FILM COATED ORAL at 03:29

## 2023-08-08 RX ADMIN — NIFEDIPINE 60 MG: 30 TABLET, FILM COATED, EXTENDED RELEASE ORAL at 07:41

## 2023-08-08 RX ADMIN — PRENATAL VITAMINS-IRON FUMARATE 27 MG IRON-FOLIC ACID 0.8 MG TABLET 1 TABLET: at 19:59

## 2023-08-08 RX ADMIN — URSODIOL 300 MG: 300 CAPSULE ORAL at 14:30

## 2023-08-08 RX ADMIN — CEFTAZIDIME 1 G: 1 INJECTION, POWDER, FOR SOLUTION INTRAMUSCULAR; INTRAVENOUS at 13:23

## 2023-08-08 RX ADMIN — SODIUM BICARBONATE 650 MG TABLET 1300 MG: at 07:42

## 2023-08-08 RX ADMIN — ASPIRIN 81 MG CHEWABLE TABLET 81 MG: 81 TABLET CHEWABLE at 07:41

## 2023-08-08 RX ADMIN — CYANOCOBALAMIN TAB 1000 MCG 1000 MCG: 1000 TAB at 08:39

## 2023-08-08 RX ADMIN — TACROLIMUS 4 MG: 1 CAPSULE ORAL at 22:04

## 2023-08-08 RX ADMIN — LEVOTHYROXINE SODIUM 50 MCG: 25 TABLET ORAL at 07:41

## 2023-08-08 RX ADMIN — MAGNESIUM OXIDE TAB 400 MG (241.3 MG ELEMENTAL MG) 800 MG: 400 (241.3 MG) TAB at 07:41

## 2023-08-08 RX ADMIN — TACROLIMUS 4 MG: 1 CAPSULE ORAL at 10:16

## 2023-08-08 RX ADMIN — CEFTAZIDIME 1 G: 1 INJECTION, POWDER, FOR SOLUTION INTRAMUSCULAR; INTRAVENOUS at 00:10

## 2023-08-08 RX ADMIN — SODIUM CHLORIDE 500 ML: 9 INJECTION, SOLUTION INTRAVENOUS at 02:43

## 2023-08-08 RX ADMIN — DOCUSATE SODIUM 100 MG: 100 CAPSULE, LIQUID FILLED ORAL at 19:59

## 2023-08-08 RX ADMIN — SODIUM BICARBONATE 650 MG TABLET 1300 MG: at 20:14

## 2023-08-08 RX ADMIN — CEFTAZIDIME 1 G: 1 INJECTION, POWDER, FOR SOLUTION INTRAMUSCULAR; INTRAVENOUS at 21:12

## 2023-08-08 ASSESSMENT — ACTIVITIES OF DAILY LIVING (ADL)
ADLS_ACUITY_SCORE: 20

## 2023-08-08 NOTE — CONSULTS
ANESTHESIA CONSULT    Patient: Mona Wagner    Patient location: lABOR AND DELIVERY floor.    Chief complaint: Patient is a  and had questions about the labor epidural and spinal.     Objective:    Respiratory Function (RR / SpO2 / Airway Patency): Satisfactory    Cardiac Function (HR / Rhythm / BP): Satisfactory    Strength and sensation lower extremities: Normal    Site of spinal/epidural insertion: No signs of infection or inflammation.     Last Vitals: /65 (BP Location: Right arm, Patient Position: Semi-Carrasco's, Cuff Size: Adult Regular)   Pulse 85   Temp 36.5  C (97.7  F) (Oral)   Resp 18   Ht 1.524 m (5')   Wt 80.3 kg (177 lb 1.6 oz)   LMP 2022   SpO2 99%   BMI 34.59 kg/m      Assessment and plan:   Mona Wagner is a 32 year old  at 33w4d initially admitted as patient of antepartum service for management of Pyelonephritis and  contractions in the setting of complex medical history notable for IgA nephropathy s/p renal transplant. Patient was moved from antepartum floor to labor floor overnight due to onset of contractions, and cervical change on exam.     She asked pertinent questions about labor epidural and spinal. I answered all her questions an explained the pros and cons of neuroaxial anesthesia as follows:    EPIDURAL/SPINAL ANESTHESIA: After numbing the skin, a spinal or epidural needle is inserted into the lower back. Local anesthetic is injected into the area around the spinal cord. This  numbs  the nerves to the lower body causing loss of sensation, allowing you to undergo surgery comfortably. Complications are rare, and can include a mild backache with muscle spasms at the site of injection, headache, or urinary retention (difficulty with voiding). If this occurs, a catheter can be temporarily inserted into the bladder for relief. During the block, your legs are  paralyzed , and you will not be able to use them until the anesthesia  "has worn off (generally three to four hours). However, the risk of permanent paralysis is extraordinarily remote, and in fact, is no more common with this type of anesthesia than with general anesthesia, and is nearly non-existent either way.    She was afraid as well about her history of difficult intubation during her parathyroidectomy on 04/28/23.  Per anesthesia note: \"hallenging intubation. 3 attempts. Short duration desaturation. Able to mask with two handed technique and  8.0 oral airway.    Grade one view with MAC 3 and Glidescope 3, however anterior airway, small mouth, and poorly movable neck,  making it challenging to advance tube into glottis despite significant curve. Some bleeding noted after attempts. Lip laceration and some right pyriform sinus bleeding. Dr. Jones examined as able with glidescope and packed area during case. This was removed and no concerning bleeding appreciated.    Recommend in future:  -Shoulder ramp  -Glidescope 3 with preformed glidescope stylet. \"    We explained what happened and assured that we will take all the  precautions to avoid the same problem to happen again.    Thank you for including us in the care for this patient.    Lee Benson Junior, MD  Anesthesia Resident, PGY4    "

## 2023-08-08 NOTE — PLAN OF CARE
Pt moved from Antepartum 420 to labor and delivery 473 due to changed SVE. 2/90%/-2. Pt still feeling contractions at the same intensity, breathing well through contractions. Pain management options discussed. Pt considering all of her options. RN discussed signs and symptoms to notify RN including but not limited to vaginal bleeding, leaking of fluid, gush of fluid, rectal/pelvic pressure. Pt verbalizes understanding.

## 2023-08-08 NOTE — PROGRESS NOTES
[facility] Antepartum Progress Note  08/08/23     Subjective:   Patient reports feeling improved this morning from a contractions standpoint. Had increase of contractions overnight, which seems to be a regular nightly occurrence. Last had a significant contraction at approximately 1100AM. Noted vaginal bleeding after SVE this morning and a little with wiping after voiding, but no overt vaginal bleeding or leaking of fluids.     Objective:  Patient Vitals for the past 24 hrs:   BP Temp Temp src Resp   08/08/23 1125 118/65 97.7  F (36.5  C) Oral 18   08/08/23 0835 124/75 -- -- --   08/08/23 0730 (!) 156/88 98  F (36.7  C) Oral 18   08/08/23 0435 139/75 98.1  F (36.7  C) Oral 18   08/08/23 0055 131/82 98  F (36.7  C) Oral 16   08/07/23 2010 (!) 147/86 97.7  F (36.5  C) Oral 16   08/07/23 1612 (!) 140/81 97.9  F (36.6  C) Oral 16         I/Os:   Intake/Output Summary (Last 24 hours) at 8/8/2023 1435  Last data filed at 8/8/2023 1340  Gross per 24 hour   Intake 2390 ml   Output 3950 ml   Net -1560 ml   UOP:     Gen: Resting comfortably in bed, NAD. Family in room.  CV: Regular rate, well perfused  Resp: Non-labored breathing on room air  Abd: Gravid, soft between contractions, non-tender, non-distended  SVE: deferred    FHT: Baseline 135 bpm, moderate variability, accelerations present, no decelerations  [place]ur[place]: 1-2 contractions in 10 minutes    BSUS: Cephalic, MVP nl    Recent Imaging:     BPP 8/7: 8/8  Renal US 8/4. No abscess or renal fluid around graft. Few bubbles in location of nephrostomy tube.       Recent Labs:   Recent Results (from the past 12 hour(s))   Comprehensive metabolic panel    Collection Time: 08/08/23  7:40 AM   Result Value Ref Range    Sodium 138 136 - 145 mmol/L    Potassium 5.2 3.4 - 5.3 mmol/L    Chloride 106 98 - 107 mmol/L    Carbon Dioxide (CO2) 19 (L) 22 - 29 mmol/L    Anion Gap 13 7 - 15 mmol/L    Urea Nitrogen 24.9 (H) 6.0 - 20.0 mg/dL    Creatinine 1.16 (H) 0.51 - 0.95 mg/dL    Calcium 8.8 8.6 -  10.0 mg/dL    Glucose 85 70 - 99 mg/dL    Alkaline Phosphatase 130 (H) 35 - 104 U/L    AST 37 0 - 45 U/L    ALT 34 0 - 50 U/L    Protein Total 6.4 6.4 - 8.3 g/dL    Albumin 3.1 (L) 3.5 - 5.2 g/dL    Bilirubin Total 1.1 <=1.2 mg/dL    GFR Estimate 65 >60 mL/min/1.73m2   CBC with platelets    Collection Time: 23  7:40 AM   Result Value Ref Range    WBC Count 10.8 4.0 - 11.0 10e3/uL    RBC Count 3.26 (L) 3.80 - 5.20 10e6/uL    Hemoglobin 10.1 (L) 11.7 - 15.7 g/dL    Hematocrit 31.3 (L) 35.0 - 47.0 %    MCV 96 78 - 100 fL    MCH 31.0 26.5 - 33.0 pg    MCHC 32.3 31.5 - 36.5 g/dL    RDW 14.6 10.0 - 15.0 %    Platelet Count 203 150 - 450 10e3/uL   Adult Type and Screen    Collection Time: 23  7:40 AM   Result Value Ref Range    ABO/RH(D) O POS     Antibody Screen Negative Negative    SPECIMEN EXPIRATION DATE [id number]    Extra Purple Top Tube    Collection Time: 23  7:40 AM   Result Value Ref Range    Hold Specimen [facility]      Assessment/ Plan:   Mona Wagner is a 32 year old  @ 33w3d by LMP c/w 8w4d US with a history of IgA nephropathy s/p renal transplant () with pregnancy complicated by allograft hydronephosis s/p PNT placement (), recurrent UTI, and multiple prior antepartum admissions, here now HD#6 with pyelonephritis and  contractions - now concerning for  labor. Patient remains afebrile, is continued on IV antibiotics, with evidence of improvement in her infection. Contractions improved. She also has new diagnosis of intrahepatic cholestasis of pregnancy and was started on Actigall.     Concern for  Labor   SVE changed from 80/-2 (0300) >> 2.5/90/-2 (1150). Cephalic by BSUS at this time. Will plan patient to remain on L&D through daytime given slow progression in cervical exam. Repeat SVE for symptoms or increased frequency of contractions.   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 She has been afebrile throughout admission. VSS. No leukocytosis or other clinical evidence of pyelonephritis at present. Possible colonization of PNT. Plan to continue ceftazidime based on sensitivities that have now resulted. Today was increased from q12h to q8h Ceftazidime IV per recommendations of pharmacy. Continue Q12h BMP while admitted   - Nephrology following, appreciate recommendations  - Continue Ceftazidime q8h - Plan is for total 7 day course of antibiotics.   - PNT in place since 6/9 (exchange scheduled for 8/14); patient has had increased urination from bladder since admission with increased hydration.  - Continue PTA azathioprine, tacrolimus  - Tacrolimus level therapeutic; additional monitoring per nephrology    Hyperkalemia-resolved  - EKG 8/5 with no acute changes. Per nephrology, no need for telemetry at present.   - Lokelma 30 mg PO - repeat Lokelma per nephrology.     Superimposed pre-eclampsia without severe features  On review of patient chart, patient meets criteria for SI pre-eclampsia without severe features due to elevated UPC. Hypertension had resolved with transplant. Prior admissions in this pregnancy with severe range BPs in the setting of renal dysfunction/allograft hydronephrosis. Persistent mild range and non-sustained SR BP with pain and contractions, now improved with nifedipine.   - Labs notable for elevated Cr 1.16 (increased from 0.5 two weeks ago).  - D#3 Nifedipine 60mg.  Increase to 90 mg tomorrow as indicated to goal SBP < 130 and DBP < 80.  - Continue to closely monitor BP  - twice weekly HELLP labs    Hypothyroidism  Secondary hyperparathyroidism with hypercalcemia   S/p resection of parathyroid glands on 4/28. Follows with endocrinology.  - Continue  levothyroxine 50 mcg QD  - 23 TSH 2.51     History of DVT  History of upper extremity DVT in  associated with dialysis catheter. Assessment for inherited thrombophilias including Factor V Leiden and prothrombin gene mutation negative. APLS testing in this pregnancy notable only for weakly positive cardiolipin IgM Ab.   - Consider DVT prophylaxis if prolonged admission and no concern for evolving  labor      History of bacterial endocarditis  No acute issues. Last echo on 3/20 with LV hyperkinetic and LVEF >70%, RV function normal.       FATOUMATA  - Continue PTA CPAP      FGR, resolved  Last Martin  EFW 13%, AC 32%  - Serial growth US q3 weeks, next 8/10  - Weekly NST with UAR, next     Routine PNC:  - Prenatal labs:               Rh: +  antibody: neg               HepB/HIV/RPR/HepC: nonreactive               GC/CT: neg               Rubella: non-immune  Varicella: non-immune               GCT: elevated; 3 hr GTT: wnl               Pap: NIL HPV neg  - Immunizations: s/p Flu and Covid. Due for Tdap. Will offer during admission   - Genetic screening: NIPT low-risk. Low-risk NT.     Rectal bleeding  - suspected due to hemorrhoids, differential diagnosis includes infection, such as c.diff.  Given lack of other symptoms, will continue with expectant management at this time.  - CBC in AM    Cholestasis of Pregnancy  Bile Acids 47 (8/3/2023), total bili 1.4(increaed), AST/ALT 32/36  -started Actigall 300 mg TID last evening  -continue vistaril as needed  -continue TID FHR monitoring     Patient seen by and care managed under the supervision of Dr. Clare Kohli MD   Obstetrics, Gynecology & Women's Health   Resident, PGY-4  2023 2:33 PM      Physician Attestation   I saw this patient with the resident and agree with the resident/fellow's findings and plan of care as documented in the note.      Key findings: In summary, Mona Wagner is a  at 33w4d admitted for suspected urinary tract  infection in context of history of renal transplant and PNT due to obstruction due to pregnancy.  Patient with  labor overnight now stable at 2.5 cm dilation.  Will complete 7 day course of antibiotics for UTI inpatient and plan cervical exams prn contractions.  Will plan to increase nifedipine to 90 mg XL tomorrow and continue close monitoring of BP as Mona also meets criteria for superimposed preeclampsia without severe features as well.         50 MINUTES SPENT BY ME on the date of service doing chart review, history, exam, documentation & further activities per the note.    I have personally reviewed the following data over the past 24 hrs:    10.8  \   10.1 (L)   / 203     138 106 24.9 (H) /  85   5.2 19 (L) 1.16 (H) \     ALT: 34 AST: 37 AP: 130 (H) TBILI: 1.1   ALB: 3.1 (L) TOT PROTEIN: 6.4 LIPASE: N/A         Tiffany Montiel MD  Date of Service (when I saw the patient): 23

## 2023-08-08 NOTE — PROGRESS NOTES
[facility]  Labor Progress Note    S:  Patient is doing okay. She has been sleeping on and off. She is not feeling painful contractions. Not yet feeling increased pressure.    O:   Patient Vitals for the past 4 hrs:   BP Temp Temp src Resp   23 1125 118/65 97.7  F (36.5  C) Oral 18   23 0835 124/75 -- -- --     SVE: 2/90/-2 (unchanged)    FHT: Baseline 145, moderate variability, + accelerations, no decelerations  [place]: 2-3 contractions in 10 minutes    A/P:  Mona Wagner is a 32 year old  at 33w4d initially admitted as patient of antepartum service for management of Pyelonephritis and  contractions in the setting of complex medical history notable for IgA nephropathy s/p renal transplant. Patient was moved from antepartum floor to labor floor overnight due to onset of contractions, and cervical change on exam. This admission has additonally been notable for diagnosis of SI Pre-eclampsia without severe features due to elevated UPC.     # Rule out labor  - Augmentation: none  - SVE: 2-2.5/90/-2, blood show on exam  - Membranes: intact   - Pain: aware of options  - Plan: Continue monitoring on labor floor for spontaneous progression of labor. Will plan for recheck of cervix in 4-6 hours or sooner as indicated    FWB:  Category I, reactive and reassuring FHT  PNC: Rh positive, GBS negative (), see H&P and progress note from today's date for additional details     Viki Castro DO, MS  Obstetrics, Gynecology & Women's Health   Resident, PGY-3  2023 12:02 PM

## 2023-08-08 NOTE — PROVIDER NOTIFICATION
08/08/23 0312   Provider Notification   Provider Name/Title Dr. Goel   Method of Notification Electronic Page   Request Evaluate in Person   Notification Reason Uterine Activity;SVE     Provider notified to come to the bedside. Pt feels contractions are getting stronger in intensity and feels some rectal/pelvic pressure at times. SVE unchanged. Plan for another 500 ml bolus of NS and recheck in a hour or unless condition changes.

## 2023-08-08 NOTE — PLAN OF CARE
Goal Outcome Evaluation:       Pt had one elevated BP, but other BPs were normal. Other VS are also stable. During the morning, pt was having regular painful q3-6m contractions and had some bloody show on toilet paper and in urine hat. Result of SVE by MD at 1149 was 2-3/90/-2. Pt had a bowel movement at around 1110 and reports that her contractions stopped feeling painful after that bowel movement. She is currently you q5-15m and does not feel with pain with her contractions. EFM shows FHR baseline of 140-145, minimal and moderate variability, accels, and no decels. Scheduled medications were given. IV Ceftazidine was given at 1323. Nephrostomy bag was emptied and I's and O'S were charted. SCDs are on. Pt denies headache, epigastric pain, or visual issues. Pt's in laws and friend visited her during the day. Continue with plan of care. Report given to Pinky AWAN RN.

## 2023-08-08 NOTE — PROGRESS NOTES
Nephrology Progress Note  2023       History of Present Illness  Mona Wagner is a 32 year old female past medical history of ESKD of unclear etiology, status post DDKT in 2019, CKD, high risk pregnancy admitted for increased contractions.  On admission noted to have creatinine elevation from baseline of 1.3-1 0.4 to 1.6 and also noted to have hyperkalemia at potassium of 5.5, repeat was 6.1.  There is associated metabolic acidosis as well.  Patient endorsed having pain in her right lower quadrant that is radiating into her groin and contractions at the same time. UA was dirty and culture was positive. Denied other significant GI issues lately. Was able to tolerate PO but pain made her not to be hydrated well encough in the past 24 to 48 hours. Endorses compliant with her medications.     Interval History :   Provider and nursing notes from past 24 hour reviewed. She has been having some contractions with some cervical changes noted. She has moved to labor and delivery. Per OB/GYN, protocols are to contact transplant surgery if they plan to proceed with . She is feeling okay. She has no fevers/chills. She has minimal LE edema.       ASSESSMENT/RECOMMENDATIONS    # DDKT: Trend up              - Baseline Creatinine: ~ 1.2-1.4 with pregnancy, Pre-pregnancy was 0.8-1.1               - Proteinuria: Mild (0.5-1.0 grams), stated to trend up since 2023              - Date DSA Last Checked: -Sep/2022      Latest DSA: No, cPRA : 25%              - BK Viremia: No              - Kidney Tx Biopsy: Sep 17, 2019; Result: No diagnostic evidence of acute rejection.      Certainly her abrupt change in her Cr was due to UTI. However, her creatinine was well above her baseline even in April of this year and suggestive of progression of her underlying kidney diease. It is possible that she will not demonstrate full recovery post pregnancy. Discussed with tx nephrology and they are aware of her current status.        #  Immunosuppression: Tacrolimus immediate release (goal 4-6) and Azathioprine (dose 150 mg daily)              - Patient is in an immunosuppressed state and will continue to monitor for efficacy and toxicity of immunosuppression medications.              - Changes: Not at this time, Tac level is 5.9. No changes  now   - will need to monitor TAC levels after delivery as I expect them to decline and we will need to again change her dose.          # Ureteral Stenosis/Hydronephrosis: Patient with moderate hydronephrosis of kidney transplant and developed CAMERON.  She underwent PNT 6/9/23 with repeat kidney transplant ultrasound 6/11 still showing moderate hydronephrosis suggesting this may be a functional hydronephrosis from pregnancy.  However, patient does report resolution of edema and dyspnea, as well as apparent post-obstructive diuresis.  She has a previous h/o ureteral stenosis with ureteral stent removed 2/2021.  Previous kidney transplant ultrasound 7/2021 also showed moderate hydronephrosis unchanged with voiding. Hydronephrosis was unchanged on 7/6/22 abdominal CT.                -Most recent U/S 7/5/23 with improvement in hydronephrosis with PNT in place              -now making some urine through urethra              -she will continue to have PCN so long as she is draining through it and likely for 4 weeks after delivery   - Neph tube exchange planned on 8/14     Attestation:  I, Dr. Briana Oliver, saw and evaluated Mona Wagner today as pat of a shared visit. I have reviewed and discussed with the NPP their history, physical exam and plan.     I personally reviewed major complaints, physical findings, investigations, medications, lab values, vital signs, I/O's and the overall management plan.      I personally performed a history, exam and agree with the assessment and plan as written.     My Key management decisions are included in the note in bold.       Briana Oliver MD MS FNKF August 8,  2023              Recommendations were communicated to primary team via this note.     Seen and discussed with Dr. Oliver.     ROGER STRAUSS, PAConchaC         Review of Systems:   A 4 point review of systems was negative except as noted above.       Physical Exam:   I/O last 3 completed shifts:  In: 3160 [P.O.:2160; IV Piggyback:1000]  Out: 3700 [Urine:3700]  /70  Pulse 85  Resp 16 SpO2 99%  Wt 80.3 kg  GENERAL APPEARANCE: alert and no distress  EYES:  no scleral icterus, pupils equal  PULM: no respiratory distress   CV: RRR      -edema trace   MS: no evidence of inflammation in joints, no muscle tenderness  NEURO: mentation intact and speech normal   Access left AVF    Labs:   All labs reviewed by me  Electrolytes/Renal -   Recent Labs   Lab Test 08/08/23  0740 08/07/23  1834 08/07/23  0747 07/17/23  1024 07/13/23  1015 07/02/23  1704 06/29/23  1300 06/22/23  1007 06/19/23  1028 06/16/23  0723 06/15/23  1009 06/12/23  0640 06/12/23  0235    138 138   < > 135*   < >  --    < > 133*   < > 133*   < > 133*   POTASSIUM 5.2 4.8 5.2   < > 5.0   < >  --    < > 5.2   < > 4.8   < > 5.7*   CHLORIDE 106 105 105   < > 104   < >  --    < > 103   < > 100   < > 103   CO2 19* 22 24   < > 21*   < >  --    < > 19*   < > 20*   < > 17*   BUN 24.9* 26.4* 28.3*   < > 29.8*   < >  --    < > 34.4*   < > 27.6*   < > 26.1*   CR 1.16* 1.26* 1.25*   < > 1.38*   < >  --    < > 1.30*   < > 1.25*   < > 1.30*   GLC 85 132* 79   < > 94   < >  --    < > 89   < > 138*   < > 119*   SHAMIR 8.8 8.4* 8.8   < > 9.3   < >  --    < > 9.7   < > 9.6   < > 9.8   MAG  --   --   --   --  2.3  --  2.0  --  1.7   < > 1.3*   < > 1.6*   PHOS  --   --   --   --  3.3  --   --   --   --   --  3.6  --  2.6    < > = values in this interval not displayed.       CBC -   Recent Labs   Lab Test 08/08/23  0740 08/04/23  0706 08/03/23  1804   WBC 10.8 9.3 7.9   HGB 10.1* 11.3* 10.6*    241 261       LFTs -   Recent Labs   Lab Test 08/08/23  0740 08/04/23  0706  08/03/23  1804 08/03/23  1014   ALKPHOS 130*  --  149* 151*   BILITOTAL 1.1  --  1.4* 1.5*   ALT 34 40 36 37   AST 37 42 32 34   PROTTOTAL 6.4  --  7.1 7.3   ALBUMIN 3.1*  --  3.5 3.7       Iron Panel -   Recent Labs   Lab Test 07/31/23  1017 05/22/23  1023 05/22/23  1022 04/25/23  1035   IRON 94  --  80 49   IRONSAT 35  --  41 27   MONET 1,525*   < >  --  1,770*    < > = values in this interval not displayed.         Imaging:  Reviewed      Current Medications:   aspirin  81 mg Oral Daily    azaTHIOprine  150 mg Oral QPM    cefTAZidime  1 g Intravenous Q8H    cyanocobalamin  1,000 mcg Oral QAM    docusate sodium  100 mg Oral BID    lactated ringers  250 mL Intravenous Once    levothyroxine  50 mcg Oral Daily    magnesium oxide  800 mg Oral QAM    NIFEdipine ER OSMOTIC  60 mg Oral Daily    prenatal multivitamin w/iron  1 tablet Oral QPM    sodium bicarbonate  1,300 mg Oral TID    sodium chloride (PF)  3 mL Intracatheter Q8H    sodium chloride        tacrolimus  4 mg Oral BID    ursodiol  300 mg Oral TID    vitamin D3  50 mcg Oral QAM      nitrous oxide/oxygen 50/50 blend      oxytocin in 0.9% NaCl      oxytocin in 0.9% NaCl      sodium bicarbonate 75 mEq in NaCl 0.45 % 1,075 mL infusion 100 mL/hr at 08/04/23 1565     ROGER STRAUSS PA-C

## 2023-08-08 NOTE — PROGRESS NOTES
Brief Progress Note:    Called to patient room due to patient noticing more contractions. Patient experiencing contractions since 0200. Patient placed on monitor. Patient noting more rectal pressure. Patient checked by RN due to writer being off the floor for other patient care. SVE: 1/90/-2. FHT: 140, moderate variability, accels no decels; Tocometer: q2-3 minutes.    Mona is a  at 33w4d with rule out  labor.  - SVE unchanged from previous exams however, Mona is experiencing more rectal pressure  - GBS neg - antibiotics not indicated  - Magnesium sulfate is not indicated for fetal neuroprotection  - Patient receiving two 500cc bolus.  - Will plan for repeat SVE in 1 hour. If SVE changed will plan to move to labor and observe for expectant management. Unchanged will remain on antepartum    Kane Goel MD, MPH  Ob/Gyn Resident, PGY-3  23 3:39 AM    Addendum:  Presented to patient room for repeat SVE in the setting of persistent contractions q2-4min. SVE: 2cm/90/-2. Given cervical change plan to move to labor for continued expectant management. Discussed with Dr. Montiel.    Kane Goel MD, MPH  Ob/Gyn Resident, PGY-3  23 4:58 AM

## 2023-08-08 NOTE — PROVIDER NOTIFICATION
08/08/23 0228   Provider Notification   Provider Name/Title Dr. Goel ([id number])   Method of Notification At Bedside   Request Evaluate in Person   Notification Reason Uterine Activity     Provider at bedside for evaluation of contractions. Plan for 500 ml bolus of NS and check cervix in an hour.

## 2023-08-09 LAB
ALBUMIN SERPL BCG-MCNC: 3 G/DL (ref 3.5–5.2)
ALBUMIN SERPL BCG-MCNC: 3 G/DL (ref 3.5–5.2)
ALP SERPL-CCNC: 124 U/L (ref 35–104)
ALP SERPL-CCNC: 124 U/L (ref 35–104)
ALT SERPL W P-5'-P-CCNC: 40 U/L (ref 0–50)
ALT SERPL W P-5'-P-CCNC: 42 U/L (ref 0–50)
ANION GAP SERPL CALCULATED.3IONS-SCNC: 10 MMOL/L (ref 7–15)
ANION GAP SERPL CALCULATED.3IONS-SCNC: 11 MMOL/L (ref 7–15)
AST SERPL W P-5'-P-CCNC: 38 U/L (ref 0–45)
AST SERPL W P-5'-P-CCNC: 40 U/L (ref 0–45)
BILIRUB SERPL-MCNC: 1.1 MG/DL
BILIRUB SERPL-MCNC: 1.1 MG/DL
BUN SERPL-MCNC: 26 MG/DL (ref 6–20)
BUN SERPL-MCNC: 26.1 MG/DL (ref 6–20)
CALCIUM SERPL-MCNC: 8.7 MG/DL (ref 8.6–10)
CALCIUM SERPL-MCNC: 9 MG/DL (ref 8.6–10)
CHLORIDE SERPL-SCNC: 104 MMOL/L (ref 98–107)
CHLORIDE SERPL-SCNC: 105 MMOL/L (ref 98–107)
CREAT SERPL-MCNC: 1.23 MG/DL (ref 0.51–0.95)
CREAT SERPL-MCNC: 1.27 MG/DL (ref 0.51–0.95)
DEPRECATED HCO3 PLAS-SCNC: 20 MMOL/L (ref 22–29)
DEPRECATED HCO3 PLAS-SCNC: 21 MMOL/L (ref 22–29)
ERYTHROCYTE [DISTWIDTH] IN BLOOD BY AUTOMATED COUNT: 14.6 % (ref 10–15)
GFR SERPL CREATININE-BSD FRML MDRD: 58 ML/MIN/1.73M2
GFR SERPL CREATININE-BSD FRML MDRD: 60 ML/MIN/1.73M2
GLUCOSE SERPL-MCNC: 169 MG/DL (ref 70–99)
GLUCOSE SERPL-MCNC: 60 MG/DL (ref 70–99)
HCT VFR BLD AUTO: 32.4 % (ref 35–47)
HGB BLD-MCNC: 10.3 G/DL (ref 11.7–15.7)
MCH RBC QN AUTO: 31.1 PG (ref 26.5–33)
MCHC RBC AUTO-ENTMCNC: 31.8 G/DL (ref 31.5–36.5)
MCV RBC AUTO: 98 FL (ref 78–100)
PLATELET # BLD AUTO: 190 10E3/UL (ref 150–450)
POTASSIUM SERPL-SCNC: 4.7 MMOL/L (ref 3.4–5.3)
POTASSIUM SERPL-SCNC: 5.6 MMOL/L (ref 3.4–5.3)
PROT SERPL-MCNC: 6.1 G/DL (ref 6.4–8.3)
PROT SERPL-MCNC: 6.3 G/DL (ref 6.4–8.3)
RBC # BLD AUTO: 3.31 10E6/UL (ref 3.8–5.2)
SODIUM SERPL-SCNC: 135 MMOL/L (ref 136–145)
SODIUM SERPL-SCNC: 136 MMOL/L (ref 136–145)
WBC # BLD AUTO: 8.8 10E3/UL (ref 4–11)

## 2023-08-09 PROCEDURE — 250N000013 HC RX MED GY IP 250 OP 250 PS 637

## 2023-08-09 PROCEDURE — 250N000011 HC RX IP 250 OP 636

## 2023-08-09 PROCEDURE — 99233 SBSQ HOSP IP/OBS HIGH 50: CPT | Mod: FS | Performed by: PHYSICIAN ASSISTANT

## 2023-08-09 PROCEDURE — 36415 COLL VENOUS BLD VENIPUNCTURE: CPT | Performed by: OBSTETRICS & GYNECOLOGY

## 2023-08-09 PROCEDURE — 85014 HEMATOCRIT: CPT | Performed by: OBSTETRICS & GYNECOLOGY

## 2023-08-09 PROCEDURE — 250N000012 HC RX MED GY IP 250 OP 636 PS 637

## 2023-08-09 PROCEDURE — 59025 FETAL NON-STRESS TEST: CPT | Mod: 26 | Performed by: OBSTETRICS & GYNECOLOGY

## 2023-08-09 PROCEDURE — 84450 TRANSFERASE (AST) (SGOT): CPT | Performed by: OBSTETRICS & GYNECOLOGY

## 2023-08-09 PROCEDURE — 258N000003 HC RX IP 258 OP 636: Performed by: OBSTETRICS & GYNECOLOGY

## 2023-08-09 PROCEDURE — 250N000011 HC RX IP 250 OP 636: Mod: JZ | Performed by: OBSTETRICS & GYNECOLOGY

## 2023-08-09 PROCEDURE — 250N000013 HC RX MED GY IP 250 OP 250 PS 637: Performed by: STUDENT IN AN ORGANIZED HEALTH CARE EDUCATION/TRAINING PROGRAM

## 2023-08-09 PROCEDURE — 250N000013 HC RX MED GY IP 250 OP 250 PS 637: Performed by: OBSTETRICS & GYNECOLOGY

## 2023-08-09 PROCEDURE — 99233 SBSQ HOSP IP/OBS HIGH 50: CPT | Mod: 25 | Performed by: OBSTETRICS & GYNECOLOGY

## 2023-08-09 PROCEDURE — 250N000012 HC RX MED GY IP 250 OP 636 PS 637: Performed by: STUDENT IN AN ORGANIZED HEALTH CARE EDUCATION/TRAINING PROGRAM

## 2023-08-09 PROCEDURE — 120N000002 HC R&B MED SURG/OB UMMC

## 2023-08-09 RX ORDER — HYDROXYZINE HYDROCHLORIDE 50 MG/1
50 TABLET, FILM COATED ORAL ONCE
Status: COMPLETED | OUTPATIENT
Start: 2023-08-09 | End: 2023-08-09

## 2023-08-09 RX ORDER — CEFTAZIDIME 1 G/1
1 INJECTION, POWDER, FOR SOLUTION INTRAMUSCULAR; INTRAVENOUS EVERY 8 HOURS
Status: DISCONTINUED | OUTPATIENT
Start: 2023-08-09 | End: 2023-08-15 | Stop reason: HOSPADM

## 2023-08-09 RX ADMIN — CEFTAZIDIME 1 G: 1 INJECTION, POWDER, FOR SOLUTION INTRAMUSCULAR; INTRAVENOUS at 05:10

## 2023-08-09 RX ADMIN — Medication 50 MCG: at 08:22

## 2023-08-09 RX ADMIN — PRENATAL VITAMINS-IRON FUMARATE 27 MG IRON-FOLIC ACID 0.8 MG TABLET 1 TABLET: at 20:46

## 2023-08-09 RX ADMIN — SODIUM BICARBONATE 650 MG TABLET 1300 MG: at 13:39

## 2023-08-09 RX ADMIN — DOCUSATE SODIUM 100 MG: 100 CAPSULE, LIQUID FILLED ORAL at 20:46

## 2023-08-09 RX ADMIN — URSODIOL 300 MG: 300 CAPSULE ORAL at 13:38

## 2023-08-09 RX ADMIN — SODIUM CHLORIDE 250 ML: 9 INJECTION, SOLUTION INTRAVENOUS at 11:43

## 2023-08-09 RX ADMIN — NIFEDIPINE 90 MG: 30 TABLET, FILM COATED, EXTENDED RELEASE ORAL at 08:19

## 2023-08-09 RX ADMIN — LEVOTHYROXINE SODIUM 50 MCG: 25 TABLET ORAL at 08:20

## 2023-08-09 RX ADMIN — MAGNESIUM OXIDE TAB 400 MG (241.3 MG ELEMENTAL MG) 800 MG: 400 (241.3 MG) TAB at 08:23

## 2023-08-09 RX ADMIN — ONDANSETRON 4 MG: 4 TABLET, ORALLY DISINTEGRATING ORAL at 03:15

## 2023-08-09 RX ADMIN — ASPIRIN 81 MG CHEWABLE TABLET 81 MG: 81 TABLET CHEWABLE at 08:19

## 2023-08-09 RX ADMIN — URSODIOL 300 MG: 300 CAPSULE ORAL at 08:19

## 2023-08-09 RX ADMIN — TACROLIMUS 4 MG: 1 CAPSULE ORAL at 22:05

## 2023-08-09 RX ADMIN — HYDROXYZINE HYDROCHLORIDE 50 MG: 50 TABLET, FILM COATED ORAL at 04:24

## 2023-08-09 RX ADMIN — URSODIOL 300 MG: 300 CAPSULE ORAL at 20:47

## 2023-08-09 RX ADMIN — SODIUM BICARBONATE 650 MG TABLET 1300 MG: at 08:23

## 2023-08-09 RX ADMIN — AZATHIOPRINE 150 MG: 50 TABLET ORAL at 22:04

## 2023-08-09 RX ADMIN — CEFTAZIDIME 1 G: 1 INJECTION, POWDER, FOR SOLUTION INTRAMUSCULAR; INTRAVENOUS at 21:10

## 2023-08-09 RX ADMIN — TACROLIMUS 4 MG: 1 CAPSULE ORAL at 10:01

## 2023-08-09 RX ADMIN — POLYETHYLENE GLYCOL 3350 17 G: 17 POWDER, FOR SOLUTION ORAL at 13:40

## 2023-08-09 RX ADMIN — CYANOCOBALAMIN TAB 1000 MCG 1000 MCG: 1000 TAB at 08:22

## 2023-08-09 RX ADMIN — CEFTAZIDIME 1 G: 1 INJECTION, POWDER, FOR SOLUTION INTRAMUSCULAR; INTRAVENOUS at 13:00

## 2023-08-09 RX ADMIN — SODIUM BICARBONATE 650 MG TABLET 1300 MG: at 20:47

## 2023-08-09 RX ADMIN — DOCUSATE SODIUM 100 MG: 100 CAPSULE, LIQUID FILLED ORAL at 08:19

## 2023-08-09 ASSESSMENT — ACTIVITIES OF DAILY LIVING (ADL)
ADLS_ACUITY_SCORE: 20

## 2023-08-09 NOTE — PROGRESS NOTES
Brief Progress Note:    Presented to patient room due to patient experiencing persistent contractions with increasing pressure since midnight. Mona was able to sleep tonight with some vistaril.     O:  Vitals:    23 1125 23 1525 23 1945 23 2300   BP: 118/65 136/76 135/87 (!) 144/83   BP Location: Right arm Right arm Right arm Right arm   Patient Position: Semi-Carrasco's Semi-Carrasco's Semi-Carrasco's Semi-Carrasco's   Cuff Size: Adult Regular Adult Regular Adult Regular Adult Regular   Pulse:    78   Resp: 18 16 18 18   Temp: 97.7  F (36.5  C) 98.2  F (36.8  C) 98.1  F (36.7  C) 98.3  F (36.8  C)   TempSrc: Oral Oral Oral Axillary   SpO2:       Weight:       Height:           Gen:  NAD  Vitals:  As above  SVE: 2.5/90/-2 chaperoned by RN    FHT: 135 baseline, moderate variability, accels, no decels  [place]: q2-5 minutes    A/P: Mona Wagner is a 32 year old  at 33w4d initially admitted as patient of antepartum service for management of Pyelonephritis and  contractions in the setting of complex medical history notable for IgA nephropathy s/p renal transplant.      PTL  SVE unchanged from previous exam. Will continue expectant management.  Vistaril PRN for sleep  Will continue on Labor given frequency of contractions.    Kane Goel MD, MPH  Ob/Gyn Resident, PGY-3  23 3:30 AM

## 2023-08-09 NOTE — PROVIDER NOTIFICATION
08/09/23 1005   Provider Notification   Provider Name/Title Dr. Castro   Method of Notification Electronic Page   Notification Reason Lab/Diagnostic Study     Patient still would like IV fluids to help with hydration. Potassium 5.6 and requesting Valtessa, requesting verification to give that for her potassium levels.

## 2023-08-09 NOTE — PLAN OF CARE
Goal Outcome Evaluation:       Patient more comfortable with contractions this afternoon. Requested IV fluids because she felt dehydrated, 250 ml normal saline bolus given. Nephrology team and Dr. Montiel here to see patients and answered patient questions. Okay to take off continuous fetal monitoring and encourage rest. Plan to recheck potassium level and then determine treatment. Delivery plan is IOL (if not in labor) at 34 weeks. Patient verbalized understanding and agreement with plan of care.

## 2023-08-09 NOTE — PROGRESS NOTES
Antimicrobial Stewardship Team Note    Antimicrobial Stewardship Program - A joint venture between [facility] and  Physicians to optimize antibiotic management.  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     Assessment:   I received a phone call this afternoon from Dr. Montiel to discuss previous Antimicrobial Stewardship Team recommendations (note placed 8/4). A quick synopsis includes a previous hospitalization in early July (7/2-7/9/2023) and was followed by ID for pyelonephritis complicated by E.coli bacteremia that was treated IV ceftriaxone followed by oral Keflex 500 mg at bedtime for secondary prophylaxis for pyelonephritis in the setting of pregnancy. Most recently, she was noted to be afebrile and hemodynamically stable on presentation. Initial labs showed a WBC within normal limits. Urine culture (from R PNT) grew 50-100k Stenotrophomonas maltophilia. Bactrim is noted to a first-line option for targeting Stenotrophomonas.      Dr. Montiel and I discussed patient's listed Bactrim allergy. I attempted to call patient's bedside phone this evening, but not able to get in touch. Per Dr. Montiel, patient reported developing a diffuse, full  body rash when previously prescribed Bactrim. Per chart review, patient's Bactrim allergy was added in 5/2014. The onset of rash in relation to starting Bactrim remains unclear. I could find previous instances where patient appeared to have tolerated Bactrim in 2013 (prescribed for UTIs in 5/2013 and 12/2013). Given presumed tolerance of previous Bactrim courses, I have a lower suspicion for an IgE-mediated reaction, which would occur shortly after (15-60 minutes) after the first 1-2 Bactrim doses and rash would be more consistent with hives (I.e., red, raised, itchy) or have other anaphylaxis signs, such as hypotension, angioedema. I have a higher suspicion for Bactrim-associated drug rash in which generalized skin eruptions may occur. A delayed, more serious type of skin reaction would be Rios-Tre syndrome or toxic epidermal necrolysis, which would appear as red-purplish blisters on skin. 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 Bactrim has also been reported as possible inducer of methemoglobinemia (Ailyn SPRAGUE. J Hematol Thrombo Dis 201,5:3).     Per our discussion, patient and provider favor avoiding Bactrim presently. Tentative plans are in place for patient to be induced at 34 weeks 0 days (8/12)  with ongoing painful contractions, worsening kidney function, and maternal preeclampsia. PNT is tentatively scheduled to be exchanged on 8/14. Dr. Montiel is not convinced the isolated Stenotrophomonas represents true infection/pyelonephritis, but rather colonization of PNT. This is a definite possibility given patient presented with minimal systemic markers of infection. With very near plans for delivery and PNT exchange, reasonable to continue ceftazidime IV while hospitalized to complete a 10-14 day total course. In the event of colonization, PNT exchange will greatly help in lessening the bacterial burden.      Recommendations:  Reasonable  to continue ceftazidime IV 1 g every 8 hours (pending kidney function) while hospitalized to complete an anticipated 10-14 day total course  With concerns of colonization, PNT exchange will greatly help in lessening the bacterial burden (exchange tentatively planned for 8/14), consider continuing ceftazidime until exchange pending clinical course      Please do not hesitate to reach out with any additional questions. If further concerns arise, consider a formal inpatient ID consult given previously followed and complexity.     Manjula Pedraza, PharmD, [facility]  Infectious Diseases Clinical Pharmacist  Pager: [phone]    Vital Signs/Clinical Features:  Vitals         08/07 0700 08/08 0659 08/08 0700 08/09 0659 08/09 0700 08/09 1730   Most Recent      Temp ( F) 97.7 -  98.1    97.7 -  98.5    98.3 -  98.7     98.7 (37.1) 08/09 1630    Pulse     78       78 08/08 2300    Resp 16 -  18    16 -  18      16     16 08/09 1630    /70 -  147/86    118/65 -  156/88    120/67 -  134/79     120/67 08/09 1630            Labs  Estimated Creatinine Clearance: 60.7 mL/min (A) (based on SCr of 1.27 mg/dL (H)).  Recent Labs   Lab Test 08/07/23  0747 08/07/23  1834 08/08/23  0740 08/08/23  2159 08/09/23  0736 08/09/23  1357   CR 1.25* 1.26* 1.16* 1.35* 1.23* 1.27*       Recent Labs   Lab Test 12/16/19  0840 12/20/19  1134 01/23/20  1301 01/24/20  0648 01/25/20  0656 01/26/20  0627 01/27/20  0900 02/02/21  0920 03/03/21  0912 08/03/23  1014 08/03/23  1804 08/04/23  0706 08/08/23  0740 08/08/23  2159 08/09/23  0736   WBC 4.3   < > 11.2* 11.4* 7.5 4.2   < > 6.3   < > 8.4 7.9 9.3 10.8 9.0 8.8   ANEU 3.3  --  9.9* 11.2* 6.2 3.1  --  4.3  --   --   --   --   --   --   --    ALYM 0.4*  --  0.3* 0.1* 0.5* 0.5*  --  1.2  --   --   --   --   --   --   --    YASIR 0.4  --  0.7 0.0 0.7 0.4  --  0.6  --   --   --   --   --   --   --    AEOS 0.2  --  0.0 0.0 0.0 0.0  --  0.1  --   --   --   --   --   --   --    HGB 10.8*   < > 10.4* 8.9*  8.1* 8.2*   < > 12.7   < > 11.0* 10.6* 11.3* 10.1* 10.4* 10.3*   HCT 35.1   < > 33.7* 28.5* 26.2* 26.3*   < > 39.9   < > 34.5* 34.2* 34.7* 31.3* 32.0* 32.4*   MCV 95   < > 94 92 92 91   < > 90   < > 96 100 99 96 96 98      < > 179 128* 107* 118*   < > 237   < > 253 261 241 203 201 190    < > = values in this interval not displayed.       Recent Labs   Lab Test 07/31/23  1017 08/03/23  1014 08/03/23  1804 08/04/23  0706 08/08/23  0740 08/09/23  0736 08/09/23  1357   BILITOTAL 1.4* 1.5* 1.4*  --  1.1 1.1 1.1   ALKPHOS 139* 151* 149*  --  130* 124* 124*   ALBUMIN 3.8 3.7 3.5  --  3.1* 3.0* 3.0*   AST 35 34 32 42 37 40 38   ALT 29 37 36 40 34 40 42       Recent Labs   Lab Test 12/29/16  1338 01/06/19  1350 01/15/19  1231 01/18/19  2219 01/19/19  0600 02/12/19  1212 08/03/19  1047 08/09/19  1905 01/23/20  1301 07/02/23  1924   LACT  --  1.1  --   --   --   --  1.9 0.5* 1.7 1.0   CRP  --   --  11.2* 9.6* 8.3* 1.4*  --   --  26.0*  --    SED 36*  --  107* 123* 116*  --   --   --  29*  --        Recent Labs   Lab Test 09/03/19  0944 09/09/19  0910 08/08/23  1010   TACROL 9.4   < > 5.9   MPACID 3.39  --   --    MPAG 52.8  --   --     < > = values in this interval not displayed.       Culture Results:  7-Day Micro Results       Procedure Component Value Units Date/Time    Group B strep PCR [[id number]]  (Normal) Collected: 08/04/23 1131    Order Status: Completed Lab Status: Final result Updated: 08/05/23 1022    Specimen: Swab from Rectovaginal      Group B Strep PCR Negative     Comment: Presumed negative for Streptococcus agalactiae (Group B Streptococcus) or the number of organisms may be below the limit of detection of the assay.       Narrative:      The [employer] Xpert GBS LB Assay, performed on the [employer]  Instrument Systems, is a qualitative in vitro diagnostic test designed to detect Group B Streptococcus (GBS) DNA from enriched vaginal/rectal swab specimens, using fully automated, real-time polymerase  chain reaction (PCR) with fluorogenic detection of the amplified DNA. Xpert GBS LB Assay testing is indicated as an aid in determining GBS colonization status in antepartum women. This assay does not diagnose or monitor treatment for GBS infections. The [employer] Xpert GBS LB Assay is intended for use in hospital, reference or state laboratory settings. The device is not intended for point-of-care use.    Wet preparation [[id number]]  (Abnormal) Collected: 08/04/23 0234    Order Status: Completed Lab Status: Final result Updated: 08/04/23 0308    Specimen: Swab from Vagina      Trichomonas Absent     Yeast Absent     Clue Cells Absent     WBCs/high power field 2+    Chlamydia trachomatis/Neisseria gonorrhoeae by PCR [[id number]]  (Normal) Collected: 08/04/23 0234    Order Status: Completed Lab Status: Final result Updated: 08/04/23 1223    Specimen: Swab from Endocervical/cervical      Chlamydia Trachomatis Negative     Comment: Negative for C. trachomatis rRNA by transcription mediated amplification.   A negative result by transcription mediated amplification does not preclude the presence of infection because results are dependent on proper and adequate collection, absence of inhibitors and sufficient rRNA to be detected.        Neisseria gonorrhoeae Negative     Comment: Negative for N. gonorrhoeae rRNA by transcription mediated amplification. A negative result by transcription mediated amplification does not preclude the presence of C. trachomatis infection because results are dependent on proper and adequate collection, absence of inhibitors and sufficient rRNA to be detected.               Recent Labs   Lab Test 03/29/23  1257 06/11/23  1532 06/26/23  1037 06/30/23  1116 07/02/23  1916   URINEPH 6.0 7.5* 7.0 6.5 5.5   NITRITE Negative Negative Negative Negative Positive*   LEUKEST Negative Small* Negative Moderate* Large*   WBCU 1 27* <1 6* 13*             Recent Labs   Lab Test 01/24/20  1132   IFLUA Not  Detected   FLUAH1 Not Detected   FLUAH3 Not Detected   [id number] Not Detected   IFLUB Not Detected   RSVA Not Detected   RSVB Not Detected   PIV1 Not Detected   PIV2 Not Detected   PIV3 Not Detected   HMPV Not Detected       Recent Labs   Lab Test 10/02/20  1345   CDBPCT Negative       Imaging: Maternal Fetal BPP Single    Result Date: 2023          BPP --------------------------------------------------------------------------------------------------------- Pat. Name: LIZZETTE PALMER       Study Date:  2023 7:05am Pat. NO:  [id number]        Referring  MD: DAYANNA GRANT Site:  [facility]       Sonographer: Kemi Croft RDMS :  [id number]        Age:   32 --------------------------------------------------------------------------------------------------------- INDICATION --------------------------------------------------------------------------------------------------------- INPATIENT-Maternal kidney transplant. Hypothyroidism. Bicornuate uterus. METHOD --------------------------------------------------------------------------------------------------------- [facility] ANTEPARTUM inpatient exam. Transabdominal ultrasound examination. View: Sufficient PREGNANCY --------------------------------------------------------------------------------------------------------- Yates pregnancy. Number of fetuses: 1 DATING ---------------------------------------------------------------------------------------------------------                                          Date                                Details                                                                                      Gest. age                      KRISSY LMP                                  2022                                                                                                                       33 w + 3 d                     2023 Prior assessment               2023                         GA: 8 w + 4 d                                                                             33 w + 1 d                     2023 Assigned dating                  Dating performed on 2023, based on the LMP                                                              33 w + 3 d                     2023 GENERAL EVALUATION --------------------------------------------------------------------------------------------------------- Cardiac activity present.  bpm. Fetal movements visualized. Presentation cephalic. Placenta Anterior Umbilical cord previously studied, 3 vessel cord. AMNIOTIC FLUID ASSESSMENT --------------------------------------------------------------------------------------------------------- Amount of AF: normal MVP 6.6 cm BIOPHYSICAL PROFILE --------------------------------------------------------------------------------------------------------- 2: Fetal breathing movements 2: Gross body movements 2: Fetal tone 2: Amniotic fluid volume 8/8 Biophysical profile score Interpretation: normal RECOMMENDATION --------------------------------------------------------------------------------------------------------- We discussed the findings on today's ultrasound with the patient. Continue  surveillance with twice weekly BPP. Thank-you for the opportunity to participate in the care of this patient. If you have questions regarding today's evaluation or if we can be of further service, please contact the [facility]. **Fetal anomalies may be present but not detected**     IMPRESSION --------------------------------------------------------------------------------------------------------- 1) Yates intrauterine pregnancy at 33w 3d gestational age. 2) The BPP is reassuring. 3) The amniotic fluid volume appeared normal.     US Renal Transplant without Doppler    Result Date: 2023  Exam: US RENAL TRANSPLANT WITHOUT DOPPLER, 2023 3:12 PM Indication: CAMERON/UTI, rule out absess vs hydro, h/o hydronephrosis  Comparison: 5/18/2023 Findings: Targeted sonographic evaluation of the right lower quadrant renal transplant with grayscale and color Doppler sonography. The renal transplant measures 14.6 cm in length, previously 13.8 cm. No transplant hydronephrosis. A nephrostomy tube is visualized within the renal pelvis with scattered small foci of air. No abscess or perinephric fluid collection visualized.     Impression: 1. No hydronephrosis or abscess. 2. The nephrostomy tube is visualized within the renal pelvis. Adjacent scattered foci of air are presumably related to instrumentation. LUZ PERSON MD   SYSTEM ID:  [id number]

## 2023-08-09 NOTE — PROVIDER NOTIFICATION
08/09/23 0100   Provider Notification   Provider Name/Title Dr. Kane Goel   Method of Notification In Department   Request Evaluate - Remote   Notification Reason Labor Status     Notified provider that pt is starting to get uncomfortable with contractions again. Will notify provider of any changes.

## 2023-08-09 NOTE — PROGRESS NOTES
Nephrology Progress Note  08/09/2023       History of Present Illness  Mona Wagner is a 32 year old female past medical history of ESKD of unclear etiology, status post DDKT in 2019, CKD, high risk pregnancy admitted for increased contractions.  On admission noted to have creatinine elevation from baseline of 1.3-1 0.4 to 1.6 and also noted to have hyperkalemia at potassium of 5.5, repeat was 6.1.  There is associated metabolic acidosis as well.  Patient endorsed having pain in her right lower quadrant that is radiating into her groin and contractions at the same time. UA was dirty and culture was positive. Denied other significant GI issues lately. Was able to tolerate PO but pain made her not to be hydrated well encough in the past 24 to 48 hours. Endorses compliant with her medications.     Interval History :   Provider and nursing notes from past 24 hour reviewed. She has been having more contractions with some cervical changes noted but minimal progress. She is anxious today. Her potassium is up to 5.6. Her BP's have been more 130-140/ range. Nifedipine increased to 90 mg. She has no fevers/chills. She has minimal LE edema.       ASSESSMENT/RECOMMENDATIONS    # DDKT: Trend up              - Baseline Creatinine: ~ 1.2-1.4 with pregnancy, Pre-pregnancy was 0.8-1.1               - Proteinuria: Mild (0.5-1.0 grams), stated to trend up since July 2023              - Date DSA Last Checked: -Sep/2022      Latest DSA: No, cPRA : 25%              - BK Viremia: No              - Kidney Tx Biopsy: Sep 17, 2019; Result: No diagnostic evidence of acute rejection.   She has several issues impacting her kidney function. She developed ureteral stenosis (PNT's now), a UTI most recently (resolved) but we have also seen progression of her underlying IgA as evidenced by her increasing proteinuria.     Discussed with her primary team. None of her parameters are severe. However, potassium is up more than she has been, blood pressures  are slightly higher, proteinuria has been increasing. From a transplant perspective, if it would be safe for the baby, an earlier delivery would be preferred. We want to avoide a circumstance where the situation becomes an emergency.   - plan is to induce at 34 weeks if she does not delivery spontaneously before then.        # Immunosuppression: Tacrolimus immediate release (goal 4-6) and Azathioprine (dose 150 mg daily)              - Patient is in an immunosuppressed state and will continue to monitor for efficacy and toxicity of immunosuppression medications.              - Changes: Not at this time, Tac level is 5.9. No changes  now   - will need to monitor TAC levels after delivery as I expect them to decline and we will need to again change her dose.    #Hyperkalemia   - K usually ~ 5, up to 5.6 today  - recommend treating with valtessa or lokelma (lokelma does work faster).   - recheck K 6 hours after dose received        # Ureteral Stenosis/Hydronephrosis: Patient with moderate hydronephrosis of kidney transplant and developed CAMERON.  She underwent PNT 6/9/23 with repeat kidney transplant ultrasound 6/11 still showing moderate hydronephrosis suggesting this may be a functional hydronephrosis from pregnancy.  However, patient does report resolution of edema and dyspnea, as well as apparent post-obstructive diuresis.  She has a previous h/o ureteral stenosis with ureteral stent removed 2/2021.  Previous kidney transplant ultrasound 7/2021 also showed moderate hydronephrosis unchanged with voiding. Hydronephrosis was unchanged on 7/6/22 abdominal CT.                -Most recent U/S 7/5/23 with improvement in hydronephrosis with PNT in place              -now making some urine through urethra              -she will continue to have PCN so long as she is draining through it and likely for 4 weeks after delivery   - Neph tube exchange planned on 8/14         Post delivery pharmacologic management: She does not plan  to breast feed. Prior to her pregnancy she was on myfortic and expect we will transition to that post delivery. If her blood pressure remains elevated to the point of treatment we can use any agents (again because she does not intend to breast feed).     Recommendations were communicated to primary team via in person.     Seen and discussed with Dr. Oliver.     ROGER STRAUSS, PAConchaC     Attestation:  I, Dr. Briana Oliver, saw and evaluated Mona Wagner today as pat of a shared visit. I have reviewed and discussed with the NPP their history, physical exam and plan.     I personally reviewed major complaints, physical findings, investigations, medications, lab values, vital signs, I/O's and the overall management plan.      I personally performed a history, exam and agree with the assessment and plan as written.     My Key management decisions are included in the note in bold.       Briana Oliver MD MS FNKF August 9, 2023              Review of Systems:   She is having no pain at PNT's  No sob  No chest pain  Tolerable contractions that she notices decline with the nifedipine      Physical Exam:   I/O last 3 completed shifts:  In: 2200 [P.O.:2100; I.V.:100]  Out: 3075 [Urine:3075]  /70  Pulse 85  Resp 16 SpO2 99%  Wt 80.3 kg  GENERAL APPEARANCE: alert and no distress  EYES:  no scleral icterus, pupils equal  PULM: no respiratory distress   CV: RRR      -edema trace   MS: no evidence of inflammation in joints, no muscle tenderness  NEURO: mentation intact and speech normal       Labs:   All labs reviewed by me  Electrolytes/Renal -   Recent Labs   Lab Test 08/09/23  0736 08/08/23  2159 08/08/23  0740 07/17/23  1024 07/13/23  1015 07/02/23  1704 06/29/23  1300 06/22/23  1007 06/19/23  1028 06/16/23  0723 06/15/23  1009 06/12/23  0640 06/12/23  0235   * 135* 138   < > 135*   < >  --    < > 133*   < > 133*   < > 133*   POTASSIUM 5.6* 5.1 5.2   < > 5.0   < >  --    < > 5.2   < > 4.8   < > 5.7*   CHLORIDE  104 104 106   < > 104   < >  --    < > 103   < > 100   < > 103   CO2 20* 21* 19*   < > 21*   < >  --    < > 19*   < > 20*   < > 17*   BUN 26.0* 25.6* 24.9*   < > 29.8*   < >  --    < > 34.4*   < > 27.6*   < > 26.1*   CR 1.23* 1.35* 1.16*   < > 1.38*   < >  --    < > 1.30*   < > 1.25*   < > 1.30*   GLC 60* 111* 85   < > 94   < >  --    < > 89   < > 138*   < > 119*   SHAMIR 8.7 8.9 8.8   < > 9.3   < >  --    < > 9.7   < > 9.6   < > 9.8   MAG  --   --   --   --  2.3  --  2.0  --  1.7   < > 1.3*   < > 1.6*   PHOS  --   --   --   --  3.3  --   --   --   --   --  3.6  --  2.6    < > = values in this interval not displayed.       CBC -   Recent Labs   Lab Test 08/09/23  0736 08/08/23  2159 08/08/23  0740   WBC 8.8 9.0 10.8   HGB 10.3* 10.4* 10.1*    201 203       LFTs -   Recent Labs   Lab Test 08/09/23  0736 08/08/23  0740 08/04/23  0706 08/03/23  1804   ALKPHOS 124* 130*  --  149*   BILITOTAL 1.1 1.1  --  1.4*   ALT 40 34 40 36   AST 40 37 42 32   PROTTOTAL 6.3* 6.4  --  7.1   ALBUMIN 3.0* 3.1*  --  3.5       Iron Panel -   Recent Labs   Lab Test 07/31/23  1017 05/22/23  1023 05/22/23  1022 04/25/23  1035   IRON 94  --  80 49   IRONSAT 35  --  41 27   MONET 1,525*   < >  --  1,770*    < > = values in this interval not displayed.         Imaging:  Reviewed      Current Medications:   aspirin  81 mg Oral Daily    azaTHIOprine  150 mg Oral QPM    cefTAZidime  1 g Intravenous Q8H    cyanocobalamin  1,000 mcg Oral QAM    docusate sodium  100 mg Oral BID    lactated ringers  250 mL Intravenous Once    levothyroxine  50 mcg Oral Daily    magnesium oxide  800 mg Oral QAM    NIFEdipine ER OSMOTIC  90 mg Oral Daily    prenatal multivitamin w/iron  1 tablet Oral QPM    sodium bicarbonate  1,300 mg Oral TID    sodium chloride (PF)  3 mL Intracatheter Q8H    tacrolimus  4 mg Oral BID    ursodiol  300 mg Oral TID    vitamin D3  50 mcg Oral QAM      nitrous oxide/oxygen 50/50 blend      oxytocin in 0.9% NaCl      oxytocin in 0.9%  NaCl      sodium bicarbonate 75 mEq in NaCl 0.45 % 1,075 mL infusion 100 mL/hr at 08/04/23 3887     ROGER STRAUSS PA-C

## 2023-08-09 NOTE — PLAN OF CARE
Pt afebrile and VSS. Mildly elevated blood pressures, which providers are aware. Pt denies HA, vision changes, or RUQ pain. No leaking of fluids per pt report. Scant bloody show noted by pt when using the restroom. Mona reported that contractions started to feel more painful around 0000. At 0323 provider notified to come to bedside because pt was reporting very painful/regular contraction pain with nausea, pelvic pressure, and shivers. Provider performed an SVE and cervix was unchanged from previous exam. Atarax was given and pt was able to get some sleep, but reports she was woken up with contraction pain a lot. Mona says she is still a bit drowsy from the Atarax. Urinary output is adequate. See flowsheets for fetal tracing documentation.  Report given to Ela Matthews RN, to continue with plan of care.

## 2023-08-09 NOTE — PROVIDER NOTIFICATION
Provider notification: Pt starting to feel pelvic pressure with contractions. Pt is also shaky, and nauseous, reporting that contractions are more intense now.

## 2023-08-09 NOTE — PROGRESS NOTES
[facility] Antepartum Progress Note  08/09/23     Subjective:   Mona is overall feeling OK today, feels tired from the Vistaril, still feeling painful contractions, particularly at night.  Denies any further bleeding, LOF.  Baby has been active.    Objective:  Vitals:    08/08/23 1525 08/08/23 1945 08/08/23 2300 08/09/23 0410   BP: 136/76 135/87 (!) 144/83 136/89   BP Location: Right arm Right arm Right arm Right arm   Patient Position: Semi-Carrasco's Semi-Carrasco's Semi-Carrasco's Semi-Carrasco's   Cuff Size: Adult Regular Adult Regular Adult Regular Adult Regular   Pulse:   78    Resp: 16 18 18 18   Temp: 98.2  F (36.8  C) 98.1  F (36.7  C) 98.3  F (36.8  C) 98.5  F (36.9  C)   TempSrc: Oral Oral Axillary Oral   SpO2:       Weight:       Height:         I/Os:   Intake/Output Summary (Last 24 hours) at 8/8/2023 1435  Last data filed at 8/8/2023 1340  Gross per 24 hour   Intake 2390 ml   Output 3950 ml   Net -1560 ml   UOP:     Gen: Resting comfortably in bed, NAD. Family in room.  CV: Regular rate, well perfused  Resp: Non-labored breathing on room air  Abd: Gravid, soft between contractions, non-tender, non-distended  SVE: deferred    FHT: Baseline 135 bpm, moderate variability, accelerations present, no decelerations   Crary: 1-2 contractions in 10 minutes    BSUS 8/8: Cephalic, MVP nl    Recent Imaging:     BPP 8/7: 8/8  Renal US 8/4. No abscess or renal fluid around graft. Few bubbles in location of nephrostomy tube.       Recent Labs:   Recent Results (from the past 12 hour(s))   CBC with platelets    Collection Time: 08/08/23  9:59 PM   Result Value Ref Range    WBC Count 9.0 4.0 - 11.0 10e3/uL    RBC Count 3.32 (L) 3.80 - 5.20 10e6/uL    Hemoglobin 10.4 (L) 11.7 - 15.7 g/dL    Hematocrit 32.0 (L) 35.0 - 47.0 %    MCV 96 78 - 100 fL    MCH 31.3 26.5 - 33.0 pg    MCHC 32.5 31.5 - 36.5 g/dL    RDW 14.6 10.0 - 15.0 %    Platelet Count 201 150 - 450 10e3/uL   Basic metabolic panel    Collection Time: 08/08/23  9:59 PM    Result Value Ref Range    Sodium 135 (L) 136 - 145 mmol/L    Potassium 5.1 3.4 - 5.3 mmol/L    Chloride 104 98 - 107 mmol/L    Carbon Dioxide (CO2) 21 (L) 22 - 29 mmol/L    Anion Gap 10 7 - 15 mmol/L    Urea Nitrogen 25.6 (H) 6.0 - 20.0 mg/dL    Creatinine 1.35 (H) 0.51 - 0.95 mg/dL    Calcium 8.9 8.6 - 10.0 mg/dL    Glucose 111 (H) 70 - 99 mg/dL    GFR Estimate 54 (L) >60 mL/min/1.73m2     Assessment/ Plan:   Mona Wagner is a 32 year old  @ 33w5d by LMP c/w 8w4d US with a history of IgA nephropathy s/p renal transplant () with pregnancy complicated by allograft hydronephosis s/p PNT placement (), recurrent UTI, and multiple prior antepartum admissions, here now HD#7 with pyelonephritis and  contractions - now concerning for  labor. Patient remains afebrile, is continued on IV antibiotics, with evidence of improvement in her infection. Contractions improved. She also has new diagnosis of intrahepatic cholestasis of pregnancy and was started on Actigall.     Concern for  Labor   SVE changed from 1/80/-2 (0300) >> 2.5/90/-2 (1150). Cephalic by BSUS at this time. Will plan patient to remain on L&D through daytime given slow progression in cervical exam. Repeat SVE for symptoms or increased frequency of contractions.   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 She has been afebrile throughout admission. VSS. No leukocytosis or other clinical evidence of pyelonephritis at present. Possible colonization of PNT. Plan to continue ceftazidime based on sensitivities that have now resulted. Today was increased from q12h to q8h Ceftazidime IV per recommendations of pharmacy. Continue Q12h BMP while  admitted   - Nephrology following, appreciate recommendations  - Continue Ceftazidime q8h - Plan is for total 7 day course of antibiotics.   - PNT in place since 6/9 (exchange scheduled for 8/14); patient has had increased urination from bladder since admission with increased hydration.  - Continue PTA azathioprine, tacrolimus  - Tacrolimus level therapeutic; additional monitoring per nephrology  - Given concern for worsening kidney function in the context of history of renal transplant and maternal preeclampsia, delivery is recommended at 34w0d, which Mona is in agreement with as she is concerned about her renal function.  Will request NICU consult due to anticipated delivery at 34 weeks.     Hyperkalemia-resolved  - EKG 8/5 with no acute changes. Per nephrology, no need for telemetry at present.   - Repeat CMP today and if still elevated, plan Lokelma 30 mg PO x 1.     Superimposed pre-eclampsia without severe features  On review of patient chart, patient meets criteria for SI pre-eclampsia without severe features due to elevated UPC. Hypertension had resolved with transplant. Prior admissions in this pregnancy with severe range BPs in the setting of renal dysfunction/allograft hydronephrosis. Persistent mild range and non-sustained SR BP with pain and contractions, now improved with nifedipine.   - Labs notable for elevated Cr 1.16   - D#3 Nifedipine 60mg.  Increase to 90 mg tomorrow as indicated to goal SBP < 130 and DBP < 80.  - Continue to closely monitor BP  - twice weekly HELLP labs    Hypothyroidism  Secondary hyperparathyroidism with hypercalcemia   S/p resection of parathyroid glands on 4/28. Follows with endocrinology.  - Continue levothyroxine 50 mcg QD  - 6/16/23 TSH 2.51     History of DVT  History of upper extremity DVT in 2014 associated with dialysis catheter. Assessment for inherited thrombophilias including Factor V Leiden and prothrombin gene mutation negative. APLS testing in this pregnancy  notable only for weakly positive cardiolipin IgM Ab.   - Consider DVT prophylaxis if prolonged admission and no concern for evolving  labor      History of bacterial endocarditis  No acute issues. Last echo on 3/20 with LV hyperkinetic and LVEF >70%, RV function normal.       FATOUMATA  - Continue PTA CPAP      FGR, resolved  Last Martin  EFW 13%, AC 32%  - Serial growth US q3 weeks, next 8/10  - Weekly NST with [facility], next     Routine PNC:  - Prenatal labs:               Rh: +  antibody: neg               HepB/HIV/RPR/HepC: nonreactive               GC/CT: neg               Rubella: non-immune  Varicella: non-immune               GCT: elevated; 3 hr GTT: wnl               Pap: NIL HPV neg  - Immunizations: s/p Flu and Covid. Due for Tdap. Will offer during admission   - Genetic screening: NIPT low-risk. Low-risk NT.     Rectal bleeding  - suspected due to hemorrhoids, differential diagnosis includes infection, such as c.diff.  Given lack of other symptoms, will continue with expectant management at this time.  - CBC in AM    Cholestasis of Pregnancy  Bile Acids 47 (8/3/2023), total bili 1.4(increaed), AST/ALT 32/  -started Actigall 300 mg TID last evening  -continue vistaril as needed  -continue TID FHR monitoring     Tiffany Montiel MD    55 MINUTES SPENT BY ME on the date of service doing chart review, history, exam, documentation & further activities per the note.

## 2023-08-09 NOTE — PROVIDER NOTIFICATION
08/09/23 0725   Provider Notification   Provider Name/Title Dr. Castro   Method of Notification Electronic Page   Notification Reason Patient Request     Patient requesting continuous IV fluids because she's feeling dehydrated.

## 2023-08-10 LAB
ALBUMIN SERPL BCG-MCNC: 3.1 G/DL (ref 3.5–5.2)
ALP SERPL-CCNC: 124 U/L (ref 35–104)
ALT SERPL W P-5'-P-CCNC: 38 U/L (ref 0–50)
ANION GAP SERPL CALCULATED.3IONS-SCNC: 11 MMOL/L (ref 7–15)
AST SERPL W P-5'-P-CCNC: 33 U/L (ref 0–45)
BILIRUB SERPL-MCNC: 1 MG/DL
BUN SERPL-MCNC: 27.8 MG/DL (ref 6–20)
CALCIUM SERPL-MCNC: 8.9 MG/DL (ref 8.6–10)
CHLORIDE SERPL-SCNC: 106 MMOL/L (ref 98–107)
CREAT SERPL-MCNC: 1.35 MG/DL (ref 0.51–0.95)
DEPRECATED HCO3 PLAS-SCNC: 20 MMOL/L (ref 22–29)
ERYTHROCYTE [DISTWIDTH] IN BLOOD BY AUTOMATED COUNT: 14.7 % (ref 10–15)
GFR SERPL CREATININE-BSD FRML MDRD: 54 ML/MIN/1.73M2
GLUCOSE SERPL-MCNC: 91 MG/DL (ref 70–99)
HCT VFR BLD AUTO: 31.7 % (ref 35–47)
HGB BLD-MCNC: 10.5 G/DL (ref 11.7–15.7)
MCH RBC QN AUTO: 31.8 PG (ref 26.5–33)
MCHC RBC AUTO-ENTMCNC: 33.1 G/DL (ref 31.5–36.5)
MCV RBC AUTO: 96 FL (ref 78–100)
PLATELET # BLD AUTO: 181 10E3/UL (ref 150–450)
POTASSIUM SERPL-SCNC: 5.1 MMOL/L (ref 3.4–5.3)
PROT SERPL-MCNC: 6.3 G/DL (ref 6.4–8.3)
RBC # BLD AUTO: 3.3 10E6/UL (ref 3.8–5.2)
SODIUM SERPL-SCNC: 137 MMOL/L (ref 136–145)
WBC # BLD AUTO: 8.6 10E3/UL (ref 4–11)

## 2023-08-10 PROCEDURE — 250N000013 HC RX MED GY IP 250 OP 250 PS 637

## 2023-08-10 PROCEDURE — 99232 SBSQ HOSP IP/OBS MODERATE 35: CPT | Mod: FS | Performed by: PHYSICIAN ASSISTANT

## 2023-08-10 PROCEDURE — 36415 COLL VENOUS BLD VENIPUNCTURE: CPT | Performed by: OBSTETRICS & GYNECOLOGY

## 2023-08-10 PROCEDURE — 76815 OB US LIMITED FETUS(S): CPT | Mod: 26 | Performed by: OBSTETRICS & GYNECOLOGY

## 2023-08-10 PROCEDURE — 258N000003 HC RX IP 258 OP 636

## 2023-08-10 PROCEDURE — 250N000012 HC RX MED GY IP 250 OP 636 PS 637

## 2023-08-10 PROCEDURE — 85027 COMPLETE CBC AUTOMATED: CPT | Performed by: OBSTETRICS & GYNECOLOGY

## 2023-08-10 PROCEDURE — 250N000013 HC RX MED GY IP 250 OP 250 PS 637: Performed by: OBSTETRICS & GYNECOLOGY

## 2023-08-10 PROCEDURE — 250N000013 HC RX MED GY IP 250 OP 250 PS 637: Performed by: STUDENT IN AN ORGANIZED HEALTH CARE EDUCATION/TRAINING PROGRAM

## 2023-08-10 PROCEDURE — 250N000012 HC RX MED GY IP 250 OP 636 PS 637: Performed by: STUDENT IN AN ORGANIZED HEALTH CARE EDUCATION/TRAINING PROGRAM

## 2023-08-10 PROCEDURE — 120N000002 HC R&B MED SURG/OB UMMC

## 2023-08-10 PROCEDURE — 80053 COMPREHEN METABOLIC PANEL: CPT | Performed by: OBSTETRICS & GYNECOLOGY

## 2023-08-10 PROCEDURE — 250N000011 HC RX IP 250 OP 636: Mod: JZ | Performed by: OBSTETRICS & GYNECOLOGY

## 2023-08-10 PROCEDURE — 99233 SBSQ HOSP IP/OBS HIGH 50: CPT | Mod: 25 | Performed by: OBSTETRICS & GYNECOLOGY

## 2023-08-10 PROCEDURE — 59025 FETAL NON-STRESS TEST: CPT | Mod: 26 | Performed by: OBSTETRICS & GYNECOLOGY

## 2023-08-10 RX ORDER — SODIUM CHLORIDE 9 MG/ML
INJECTION, SOLUTION INTRAVENOUS
Status: COMPLETED
Start: 2023-08-10 | End: 2023-08-10

## 2023-08-10 RX ORDER — NIFEDIPINE 30 MG/1
60 TABLET, EXTENDED RELEASE ORAL EVERY 12 HOURS
Status: DISCONTINUED | OUTPATIENT
Start: 2023-08-11 | End: 2023-08-15 | Stop reason: HOSPADM

## 2023-08-10 RX ORDER — NIFEDIPINE 30 MG/1
30 TABLET, EXTENDED RELEASE ORAL ONCE
Status: COMPLETED | OUTPATIENT
Start: 2023-08-10 | End: 2023-08-10

## 2023-08-10 RX ADMIN — SODIUM BICARBONATE 650 MG TABLET 1300 MG: at 07:55

## 2023-08-10 RX ADMIN — URSODIOL 300 MG: 300 CAPSULE ORAL at 07:55

## 2023-08-10 RX ADMIN — SODIUM BICARBONATE 650 MG TABLET 1300 MG: at 13:57

## 2023-08-10 RX ADMIN — NIFEDIPINE 90 MG: 30 TABLET, FILM COATED, EXTENDED RELEASE ORAL at 07:55

## 2023-08-10 RX ADMIN — PRENATAL VITAMINS-IRON FUMARATE 27 MG IRON-FOLIC ACID 0.8 MG TABLET 1 TABLET: at 20:06

## 2023-08-10 RX ADMIN — TACROLIMUS 4 MG: 1 CAPSULE ORAL at 22:15

## 2023-08-10 RX ADMIN — CEFTAZIDIME 1 G: 1 INJECTION, POWDER, FOR SOLUTION INTRAMUSCULAR; INTRAVENOUS at 05:04

## 2023-08-10 RX ADMIN — AZATHIOPRINE 150 MG: 50 TABLET ORAL at 22:15

## 2023-08-10 RX ADMIN — TACROLIMUS 4 MG: 1 CAPSULE ORAL at 09:57

## 2023-08-10 RX ADMIN — ASPIRIN 81 MG CHEWABLE TABLET 81 MG: 81 TABLET CHEWABLE at 07:55

## 2023-08-10 RX ADMIN — DOCUSATE SODIUM 100 MG: 100 CAPSULE, LIQUID FILLED ORAL at 20:06

## 2023-08-10 RX ADMIN — CEFTAZIDIME 1 G: 1 INJECTION, POWDER, FOR SOLUTION INTRAMUSCULAR; INTRAVENOUS at 13:21

## 2023-08-10 RX ADMIN — MAGNESIUM OXIDE TAB 400 MG (241.3 MG ELEMENTAL MG) 800 MG: 400 (241.3 MG) TAB at 07:55

## 2023-08-10 RX ADMIN — LEVOTHYROXINE SODIUM 50 MCG: 25 TABLET ORAL at 07:55

## 2023-08-10 RX ADMIN — SODIUM CHLORIDE: 0.9 INJECTION, SOLUTION INTRAVENOUS at 21:19

## 2023-08-10 RX ADMIN — CEFTAZIDIME 1 G: 1 INJECTION, POWDER, FOR SOLUTION INTRAMUSCULAR; INTRAVENOUS at 21:19

## 2023-08-10 RX ADMIN — SODIUM CHLORIDE: 9 INJECTION, SOLUTION INTRAVENOUS at 21:19

## 2023-08-10 RX ADMIN — SODIUM BICARBONATE 650 MG TABLET 1300 MG: at 20:06

## 2023-08-10 RX ADMIN — DOCUSATE SODIUM 100 MG: 100 CAPSULE, LIQUID FILLED ORAL at 07:56

## 2023-08-10 RX ADMIN — URSODIOL 300 MG: 300 CAPSULE ORAL at 20:06

## 2023-08-10 RX ADMIN — NIFEDIPINE 30 MG: 30 TABLET, EXTENDED RELEASE ORAL at 11:33

## 2023-08-10 RX ADMIN — ACETAMINOPHEN 650 MG: 325 TABLET, FILM COATED ORAL at 05:10

## 2023-08-10 RX ADMIN — CYANOCOBALAMIN TAB 1000 MCG 1000 MCG: 1000 TAB at 07:56

## 2023-08-10 RX ADMIN — Medication 50 MCG: at 07:56

## 2023-08-10 RX ADMIN — URSODIOL 300 MG: 300 CAPSULE ORAL at 13:57

## 2023-08-10 ASSESSMENT — ACTIVITIES OF DAILY LIVING (ADL)
ADLS_ACUITY_SCORE: 20

## 2023-08-10 NOTE — PLAN OF CARE
Goal Outcome Evaluation:       Patient is stable. Anxious about tomorrow's induction of labor. I did lots of education about what to expect. Patient requested NICU consult, Dr. Castro was notified to put an order and call NICU. Denies pain or headache. FHR and uterine activity see flow sheet. Continue with plan of care..

## 2023-08-10 NOTE — PROGRESS NOTES
Nephrology Progress Note  08/10/2023       History of Present Illness  Mona Wagner is a 32 year old female past medical history of ESKD of unclear etiology, status post DDKT in 2019, CKD, high risk pregnancy admitted for increased contractions.  On admission noted to have creatinine elevation from baseline of 1.3-1 0.4 to 1.6 and also noted to have hyperkalemia at potassium of 5.5, repeat was 6.1.  There is associated metabolic acidosis as well.  Patient endorsed having pain in her right lower quadrant that is radiating into her groin and contractions at the same time. UA was dirty and culture was positive.      Interval History :   Provider and nursing notes from past 24 hour reviewed. She is scheduled for induction tomorrow if she does not go in to labor spontaneously before then. She is anxious about labor and delivery and has been searching the internet for guidance. Her potassium is better today at 5.1  Her  BP's have been more 130-140/ range. Nifedipine increased yesterday  to 90 mg. She has no fevers/chills. She has minimal LE edema.       ASSESSMENT/RECOMMENDATIONS    # DDKT: Trend up              - Baseline Creatinine: ~ 1.2-1.4 with pregnancy, Pre-pregnancy was 0.8-1.1               - Proteinuria: Mild (0.5-1.0 grams), stated to trend up since July 2023              - Date DSA Last Checked: -Sep/2022      Latest DSA: No, cPRA : 25%              - BK Viremia: No              - Kidney Tx Biopsy: Sep 17, 2019; Result: No diagnostic evidence of acute rejection.   She has several issues impacting her kidney function. She developed ureteral stenosis (PNT's now), a UTI most recently (resolved) but we have also seen progression of her underlying IgA as evidenced by her increasing proteinuria.     Plan is to induce tomorrow. I do not have reason to believe that labor will harm her transplanted kidney and proceeding with plans for a vaginal birth are expected.     She is concerned regarding her perc neph tubes and  occ bleeding with straining. I do not have concerns. Bleeding can be seen and occasionally can cause a clot that prevents the tube from draining. IR can manage that. It can take weeks for the kidneys to recover to the point of not needing a perc neph after delivery. She should see IR on the 14th as planned. They will do an antegrade study to see if she does indeed still need them. If she does not they will remove them. If, as I suspect, she still does, they will leave them in and tx nephrology can arrange the next follow up when they see her post partum.        # Immunosuppression: Tacrolimus immediate release (goal 4-6) and Azathioprine (dose 150 mg daily)              - Patient is in an immunosuppressed state and will continue to monitor for efficacy and toxicity of immunosuppression medications.              - Changes: Not at this time, recent Tac level is 5.9. No changes  now   - will need to monitor TAC levels after delivery as I expect them to increase and we will need to again change her dose.   - once she delivers, stop azathioprine and resume mycophenolic acid 540 mg BID. This can be done as soon as she delivers    #Hyperkalemia   - K usually ~ 5, up to 5.6  yesterday, back to baseline today at 5.1  - recommend treating hyperkalemia with valtessa or low dose lokelma (lokelma does work faster).   - recheck K 6 hours after treating         # Ureteral Stenosis/Hydronephrosis: Patient with moderate hydronephrosis of kidney transplant and developed CAMERON.  She underwent PNT 6/9/23 with repeat kidney transplant ultrasound 6/11 still showing moderate hydronephrosis suggesting this may be a functional hydronephrosis from pregnancy.  However, patient does report resolution of edema and dyspnea, as well as apparent post-obstructive diuresis.  She has a previous h/o ureteral stenosis with ureteral stent removed 2/2021.  Previous kidney transplant ultrasound 7/2021 also showed moderate hydronephrosis unchanged with  voiding. Hydronephrosis was unchanged on 7/6/22 abdominal CT.                -Most recent U/S 7/5/23 with improvement in hydronephrosis with PNT in place              -now making some urine through urethra              -she will continue to have PCN so long as she is draining through it and likely for 4 weeks after delivery   - Neph tube exchange planned on 8/14, will keep scheduled for now   - removal of PCN tubes depends on post partum course but will likely need them at last a week after delivery          Post delivery pharmacologic management: She does not plan to breast feed. Prior to her pregnancy she was on myfortic and expect we will transition to that post delivery. If her blood pressure remains elevated to the point of treatment we can use any agents (again because she does not intend to breast feed).     Recommendations were communicated to primary team via in person.     Seen and discussed with Dr. Oliver.     ROGER STRAUSS PA-C     Attestation:  I, Dr. Briana Oliver, saw and evaluated Mona Wagner today as pat of a shared visit. I have reviewed and discussed with the NPP their history, physical exam and plan.     I personally reviewed major complaints, physical findings, investigations, medications, lab values, vital signs, I/O's and the overall management plan.      I personally performed a history, exam and agree with the assessment and plan as written.     My Key management decisions are included in the note in bold. I        Briana Oliver MD MS FNKF August 10, 2023              Review of Systems:   She is sitting up in chair today, feeling anxious about labs and delivery.   having no pain at PNT's  No sob  No chest pain      Physical Exam:   I/O last 3 completed shifts:  In: 2940 [P.O.:2690; IV Piggyback:250]  Out: 3000 [Urine:3000]  /80  Pulse 78  Resp 17 SpO2 99%  Wt 80.3 kg  GENERAL APPEARANCE: alert and no distress  EYES:  no scleral icterus, pupils equal  PULM: no respiratory  distress   CV: RRR      -edema trace   MS: no evidence of inflammation in joints, no muscle tenderness  NEURO: mentation intact and speech normal       Labs:   All labs reviewed by me  Electrolytes/Renal -   Recent Labs   Lab Test 08/09/23  1357 08/09/23  0736 08/08/23 2159 07/17/23  1024 07/13/23  1015 07/02/23  1704 06/29/23  1300 06/22/23  1007 06/19/23  1028 06/16/23  0723 06/15/23  1009 06/12/23  0640 06/12/23  0235    135* 135*   < > 135*   < >  --    < > 133*   < > 133*   < > 133*   POTASSIUM 4.7 5.6* 5.1   < > 5.0   < >  --    < > 5.2   < > 4.8   < > 5.7*   CHLORIDE 105 104 104   < > 104   < >  --    < > 103   < > 100   < > 103   CO2 21* 20* 21*   < > 21*   < >  --    < > 19*   < > 20*   < > 17*   BUN 26.1* 26.0* 25.6*   < > 29.8*   < >  --    < > 34.4*   < > 27.6*   < > 26.1*   CR 1.27* 1.23* 1.35*   < > 1.38*   < >  --    < > 1.30*   < > 1.25*   < > 1.30*   * 60* 111*   < > 94   < >  --    < > 89   < > 138*   < > 119*   SHAMIR 9.0 8.7 8.9   < > 9.3   < >  --    < > 9.7   < > 9.6   < > 9.8   MAG  --   --   --   --  2.3  --  2.0  --  1.7   < > 1.3*   < > 1.6*   PHOS  --   --   --   --  3.3  --   --   --   --   --  3.6  --  2.6    < > = values in this interval not displayed.       CBC -   Recent Labs   Lab Test 08/09/23  0736 08/08/23 2159 08/08/23  0740   WBC 8.8 9.0 10.8   HGB 10.3* 10.4* 10.1*    201 203       LFTs -   Recent Labs   Lab Test 08/09/23  1357 08/09/23  0736 08/08/23  0740   ALKPHOS 124* 124* 130*   BILITOTAL 1.1 1.1 1.1   ALT 42 40 34   AST 38 40 37   PROTTOTAL 6.1* 6.3* 6.4   ALBUMIN 3.0* 3.0* 3.1*       Iron Panel -   Recent Labs   Lab Test 07/31/23  1017 05/22/23  1023 05/22/23  1022 04/25/23  1035   IRON 94  --  80 49   IRONSAT 35  --  41 27   MONET 1,525*   < >  --  1,770*    < > = values in this interval not displayed.         Imaging:  Reviewed      Current Medications:   aspirin  81 mg Oral Daily    azaTHIOprine  150 mg Oral QPM    cefTAZidime  1 g Intravenous Q8H     cyanocobalamin  1,000 mcg Oral QAM    docusate sodium  100 mg Oral BID    levothyroxine  50 mcg Oral Daily    magnesium oxide  800 mg Oral QAM    NIFEdipine ER OSMOTIC  90 mg Oral Daily    prenatal multivitamin w/iron  1 tablet Oral QPM    sodium bicarbonate  1,300 mg Oral TID    sodium chloride (PF)  3 mL Intracatheter Q8H    tacrolimus  4 mg Oral BID    ursodiol  300 mg Oral TID    vitamin D3  50 mcg Oral QAM         ROGER STRAUSS, PA-C

## 2023-08-10 NOTE — PLAN OF CARE
Pt afebrile, VS normotensive to mild range hypertension. Denies symptoms of pre-e. Hematuria now resolved. Reports occasional painless contractions, denies LOF, VB. Plan for IOL tomorrow morning unless pt desires to start earlier. NICU notified that pt desires repeat NICU consult this evening or tomorrow morning.

## 2023-08-10 NOTE — PROGRESS NOTES
[facility] Antepartum Progress Note  08/10/23     Subjective:   Mona is feeling ok today. Patient's partner and parents are at the bedside. She has a lot of anxiety about what the delivery process will look like in anticipation of her induction starting tomorrow. She is wondering if the pushing during delivery will damage her kidney. She shares that sometimes when she strains to have a BM she sees blood in her nephrostomy tube and is concerned this will happen during delivery.     oMna has not had vaginal bleeding or loss of fluid. Contractions have spaced and are now very infrequent. Fetal movement is normal.     Objective:  Vitals:    08/09/23 2343 08/10/23 0330 08/10/23 0800 08/10/23 1100   BP: (!) 141/86 133/82 (!) 146/95 (!) 142/88   BP Location: Right arm Right arm     Patient Position: Sitting Semi-Carrasco's     Cuff Size: Adult Regular Adult Regular     Pulse:       Resp: 18 16     Temp: 97.9  F (36.6  C) 98.1  F (36.7  C) 98  F (36.7  C)    TempSrc: Oral Oral Oral    SpO2:       Weight:       Height:         I/Os:   Intake/Output Summary (Last 24 hours) at 8/10/2023 1257  Last data filed at 8/10/2023 0530  Gross per 24 hour   Intake 2340 ml   Output 2550 ml   Net -210 ml     Gen: Resting comfortably in bed, NAD. Family in room.  CV: Regular rate, well perfused  Resp: Non-labored breathing on room air  Abd: Gravid  SVE: deferred    FHT: Baseline 135 bpm, moderate variability, accelerations present, no decelerations   [place]: 1-2 contractions in 10 minutes    BSUS 8/8: Cephalic, MVP nl    Recent Imaging:   BPP 8/7: 8/8  Renal US 8/4. No abscess or renal fluid around graft. Few bubbles in location of nephrostomy tube.     Recent Labs:    Latest Reference Range & Units 08/10/23 09:34   Sodium 136 - 145 mmol/L 137   Potassium 3.4 - 5.3 mmol/L 5.1   Chloride 98 - 107 mmol/L 106   Carbon Dioxide (CO2) 22 - 29 mmol/L 20 (L)   Urea Nitrogen 6.0 - 20.0 mg/dL 27.8 (H)   Creatinine 0.51 - 0.95 mg/dL 1.35 (H)   GFR Estimate >60  mL/min/1.73m2 54 (L)   Calcium 8.6 - 10.0 mg/dL 8.9   Anion Gap 7 - 15 mmol/L 11   Albumin 3.5 - 5.2 g/dL 3.1 (L)   Protein Total 6.4 - 8.3 g/dL 6.3 (L)   Alkaline Phosphatase 35 - 104 U/L 124 (H)   ALT 0 - 50 U/L 38   AST 0 - 45 U/L 33   Bilirubin Total <=1.2 mg/dL 1.0   Glucose 70 - 99 mg/dL 91   (L): Data is abnormally low  (H): Data is abnormally high     Latest Reference Range & Units 08/10/23 09:34   WBC 4.0 - 11.0 10e3/uL 8.6   Hemoglobin 11.7 - 15.7 g/dL 10.5 (L)   Hematocrit 35.0 - 47.0 % 31.7 (L)   Platelet Count 150 - 450 10e3/uL 181   (L): Data is abnormally low    Assessment/ Plan:  Mona Wagner is a 32 year old  @ 33w6d by LMP c/w 8w4d US with a history of IgA nephropathy s/p renal transplant () with 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 Patient has been afebrile and continues on IV antibiotics. Patient reports that she is feeling contractions less frequently today. Plan is for IOL tomorrow. Per nephrology, not concerned that valsalva required during delivery will damage transplanted kidney.    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 She has been afebrile throughout admission. No leukocytosis or other clinical evidence of pyelonephritis at present. Possible colonization of PNT. Plan to continue ceftazidime q8h based on sensitivities.   - Nephrology following, appreciate recommendations  - Continue Ceftazidime q8h - plan to continue until discharge, or 10-14 days (which ever occurs first)  - PNT in place since  (exchange scheduled for ), patient has intermittently spontaneously voided during this admission.  - Continue PTA azathioprine,  tacrolimus  - Tacrolimus level therapeutic; additional monitoring per nephrology  - Q12h BMP while inpatient.  - Given concern for worsening kidney function in the context of history of renal transplant and maternal preeclampsia, planning for IOL at 34w0d which patient is in agreement with. NICU consulted in anticipation of delivery at 34w.     Concern for  Labor   SVE changed from 1/80/-2 >> 2.5/90/-2 on 23. Cephalic by BSUS on . Will plan for patient to remain on L&D given slow progression in cervical exam and plan for IOL tomorrow.   - s/p nifedipine tocolysis  - S/p BMZ 6/10-; and rescue course  and   - Wet prep negative, GC/CT neg  - GBS negative  - NICU consulted    Hyperkalemia-resolved  - EKG  with no acute changes. Per nephrology, no need for telemetry at present.   - Potassium wnl today      Superimposed pre-eclampsia without severe features  On review of patient chart, patient meets criteria for SI pre-eclampsia without severe features due to elevated UPC. Hypertension had resolved with transplant. Prior admissions in this pregnancy with severe range BPs in the setting of renal dysfunction/allograft hydronephrosis. Patient continues to have elevated Bps (above goal of SBP < 130 and DBP < 80) after starting receiving nifedipine 90 mg today. Will give an extra 30 mg nifedipine today and then start nifedipine 60 mg BID tomorrow.   - Continue to closely monitor BP  - Cr elevated above baseline at 1.35, other HELLP labs wnl 8/10  - D#1 nifedipine 120 mg, will plan for 60 mg BID tomorrow  - Daily HELLP labs    Hypothyroidism  Secondary hyperparathyroidism with hypercalcemia   S/p resection of parathyroid glands on . Follows with endocrinology.  - Continue levothyroxine 50 mcg QD  - 23 TSH 2.51     History of DVT  History of upper extremity DVT in  associated with dialysis catheter. Assessment for inherited thrombophilias including Factor V Leiden and prothrombin gene  mutation negative. APLS testing in this pregnancy notable only for weakly positive cardiolipin IgM Ab.   - Consider DVT prophylaxis if prolonged admission     History of bacterial endocarditis  No acute issues. Last echo on 3/20 with LV hyperkinetic and LVEF >70%, RV function normal.       FATOUMATA  - Continue PTA CPAP      FGR, resolved  Last Martin  EFW 13%, AC 32%    Rectal bleeding, resolved  Bleeding likely due to hemorrhoids. Patient without diarrhea. Hgb stable at 10.5    Cholestasis of Pregnancy  Bile Acids 47 (8/3/2023)  - Actigall 300 mg TID  - Vistaril prn   -continue TID FHR monitoring     Routine PNC:  - Prenatal labs:               Rh: +  antibody: neg               HepB/HIV/RPR/HepC: nonreactive               GC/CT: neg               Rubella: non-immune  Varicella: non-immune               GCT: elevated; 3 hr GTT: wnl               Pap: NIL HPV neg  - Immunizations: s/p Flu and Covid. S/p Tdap 23.  - Genetic screening: NIPT low-risk. Low-risk NT.     Jenifer Toledo MS4    Physician Attestation   I saw this patient with the resident and agree with the resident/fellow's findings and plan of care as documented in the note.      Key findings: In summary, Mona Wagner is a  at 33w6d admitted for inpatient management of suspected UTI and concern for increasing creatinine in context of history of renal transplant for IgA nephropathy and PNT placement due to history of ureteral obstruction due to pregnancy.  Currently undergoing IV antibiotic treatment until either 10-14 days or through discharge.  Given increasing creatine at this phase of pregnancy, plan to proceed with delivery at 34w0d (tomorrow).  Plan to increase Nifedipine to 60 mg XL BID (120 mg daily total) as well and continue close monitoring of BP as Mona meets criteria for superimposed preeclampsia without severe features, as her creatinine increase is more likely related to her underlying renal disease.     50 MINUTES SPENT BY ME on the date  of service doing chart review, history, exam, documentation & further activities per the note.    I have personally reviewed the following data over the past 24 hrs:    8.6  \   10.5 (L)   / 181     137 106 27.8 (H) /  91   5.1 20 (L) 1.35 (H) \     ALT: 38 AST: 33 AP: 124 (H) TBILI: 1.0   ALB: 3.1 (L) TOT PROTEIN: 6.3 (L) LIPASE: N/A         Tiffany Montiel MD  Date of Service (when I saw the patient): 08/10/23

## 2023-08-10 NOTE — PROGRESS NOTES
Interval Resident OB/GYN Progress Note     To patient's room to assess hematuria. Patient notes increased frequency of tightening of her abdomen consistent with contractions - these occur 2-3 times in an hour, which has increased from 2-3 times in several hours. Concerned that contractions are causing hematuria. Last had pink-tinged urine three days ago. Is still amenable to induction beginning tomorrow morning.     Evaluated urine coloration - clear, yellow in tubing; orange-tinged in bag. No abdominal pain, headache, vision changes, chest pain or shortness of breath, or worsening edema. Blood pressures mild range at this time.    Tasha Kohli MD   OB/GYN PGY-4  08/10/23 4:21 PM

## 2023-08-10 NOTE — PROGRESS NOTES
VSS. Patient denies bleeding or leaking of fluid. Plan for IOL tomorrow. Patient agreeable to plan. See flow sheet for FHR.

## 2023-08-10 NOTE — PROVIDER NOTIFICATION
08/10/23 1525 08/10/23 1604   Provider Notification   Provider Name/Title Dr Castro, Dr VALDEMAR Kohli   Method of Notification In Department At Bedside   Notification Reason Other (Comment)  (hematuria)  --      Pt reports new onset of hematuria. Urine in nephrostomy collection bag tubing pink. MD team notified. Denies signs/symptoms of pre-eclampsia and  labor. Denies VB, LOF. BP normal to mild range hypertension. MD to bedside to evaluate.

## 2023-08-10 NOTE — PROGRESS NOTES
Writer took over care at 0545. Pt resting in room. VSS. Vero int- see flowsheet. FHR monitored- see flowsheet. Denies LOF, endorses scant blood when wiping. Plan for IOL 8/11. Call light within reach. No needs at this time.

## 2023-08-11 ENCOUNTER — ANESTHESIA EVENT (OUTPATIENT)
Dept: OBGYN | Facility: CLINIC | Age: 33
End: 2023-08-11
Payer: MEDICARE

## 2023-08-11 ENCOUNTER — ANESTHESIA (OUTPATIENT)
Dept: OBGYN | Facility: CLINIC | Age: 33
End: 2023-08-11
Payer: MEDICARE

## 2023-08-11 LAB
ABO/RH(D): NORMAL
ALBUMIN SERPL BCG-MCNC: 3.2 G/DL (ref 3.5–5.2)
ALP SERPL-CCNC: 134 U/L (ref 35–104)
ALT SERPL W P-5'-P-CCNC: 35 U/L (ref 0–50)
ANION GAP SERPL CALCULATED.3IONS-SCNC: 10 MMOL/L (ref 7–15)
ANTIBODY SCREEN: NEGATIVE
AST SERPL W P-5'-P-CCNC: 33 U/L (ref 0–45)
BILIRUB SERPL-MCNC: 1 MG/DL
BUN SERPL-MCNC: 22.6 MG/DL (ref 6–20)
CALCIUM SERPL-MCNC: 9.1 MG/DL (ref 8.6–10)
CHLORIDE SERPL-SCNC: 108 MMOL/L (ref 98–107)
CREAT SERPL-MCNC: 1.12 MG/DL (ref 0.51–0.95)
DEPRECATED HCO3 PLAS-SCNC: 20 MMOL/L (ref 22–29)
ERYTHROCYTE [DISTWIDTH] IN BLOOD BY AUTOMATED COUNT: 14.6 % (ref 10–15)
GFR SERPL CREATININE-BSD FRML MDRD: 68 ML/MIN/1.73M2
GLUCOSE SERPL-MCNC: 89 MG/DL (ref 70–99)
HCT VFR BLD AUTO: 33.2 % (ref 35–47)
HGB BLD-MCNC: 10.8 G/DL (ref 11.7–15.7)
MCH RBC QN AUTO: 31.5 PG (ref 26.5–33)
MCHC RBC AUTO-ENTMCNC: 32.5 G/DL (ref 31.5–36.5)
MCV RBC AUTO: 97 FL (ref 78–100)
PLATELET # BLD AUTO: 182 10E3/UL (ref 150–450)
POTASSIUM SERPL-SCNC: 4.9 MMOL/L (ref 3.4–5.3)
PROT SERPL-MCNC: 6.6 G/DL (ref 6.4–8.3)
RBC # BLD AUTO: 3.43 10E6/UL (ref 3.8–5.2)
SODIUM SERPL-SCNC: 138 MMOL/L (ref 136–145)
SPECIMEN EXPIRATION DATE: NORMAL
WBC # BLD AUTO: 9.2 10E3/UL (ref 4–11)

## 2023-08-11 PROCEDURE — 370N000003 HC ANESTHESIA WARD SERVICE: Performed by: ANESTHESIOLOGY

## 2023-08-11 PROCEDURE — 10907ZC DRAINAGE OF AMNIOTIC FLUID, THERAPEUTIC FROM PRODUCTS OF CONCEPTION, VIA NATURAL OR ARTIFICIAL OPENING: ICD-10-PCS | Performed by: STUDENT IN AN ORGANIZED HEALTH CARE EDUCATION/TRAINING PROGRAM

## 2023-08-11 PROCEDURE — 00HU33Z INSERTION OF INFUSION DEVICE INTO SPINAL CANAL, PERCUTANEOUS APPROACH: ICD-10-PCS | Performed by: ANESTHESIOLOGY

## 2023-08-11 PROCEDURE — 3E0R3BZ INTRODUCTION OF ANESTHETIC AGENT INTO SPINAL CANAL, PERCUTANEOUS APPROACH: ICD-10-PCS | Performed by: ANESTHESIOLOGY

## 2023-08-11 PROCEDURE — 250N000013 HC RX MED GY IP 250 OP 250 PS 637: Performed by: OBSTETRICS & GYNECOLOGY

## 2023-08-11 PROCEDURE — 85014 HEMATOCRIT: CPT | Performed by: OBSTETRICS & GYNECOLOGY

## 2023-08-11 PROCEDURE — 250N000013 HC RX MED GY IP 250 OP 250 PS 637

## 2023-08-11 PROCEDURE — 80053 COMPREHEN METABOLIC PANEL: CPT

## 2023-08-11 PROCEDURE — 250N000013 HC RX MED GY IP 250 OP 250 PS 637: Performed by: STUDENT IN AN ORGANIZED HEALTH CARE EDUCATION/TRAINING PROGRAM

## 2023-08-11 PROCEDURE — 250N000011 HC RX IP 250 OP 636: Mod: JZ | Performed by: OBSTETRICS & GYNECOLOGY

## 2023-08-11 PROCEDURE — 120N000002 HC R&B MED SURG/OB UMMC

## 2023-08-11 PROCEDURE — 99231 SBSQ HOSP IP/OBS SF/LOW 25: CPT

## 2023-08-11 PROCEDURE — 86850 RBC ANTIBODY SCREEN: CPT | Performed by: OBSTETRICS & GYNECOLOGY

## 2023-08-11 PROCEDURE — 250N000012 HC RX MED GY IP 250 OP 636 PS 637: Performed by: STUDENT IN AN ORGANIZED HEALTH CARE EDUCATION/TRAINING PROGRAM

## 2023-08-11 PROCEDURE — 250N000011 HC RX IP 250 OP 636

## 2023-08-11 PROCEDURE — 36415 COLL VENOUS BLD VENIPUNCTURE: CPT

## 2023-08-11 PROCEDURE — 99232 SBSQ HOSP IP/OBS MODERATE 35: CPT | Mod: FS | Performed by: PHYSICIAN ASSISTANT

## 2023-08-11 PROCEDURE — 258N000003 HC RX IP 258 OP 636

## 2023-08-11 PROCEDURE — 250N000012 HC RX MED GY IP 250 OP 636 PS 637

## 2023-08-11 PROCEDURE — 250N000011 HC RX IP 250 OP 636: Mod: JZ

## 2023-08-11 PROCEDURE — 250N000009 HC RX 250

## 2023-08-11 PROCEDURE — 86901 BLOOD TYPING SEROLOGIC RH(D): CPT | Performed by: OBSTETRICS & GYNECOLOGY

## 2023-08-11 RX ORDER — LABETALOL HYDROCHLORIDE 5 MG/ML
20 INJECTION, SOLUTION INTRAVENOUS
Status: DISCONTINUED | OUTPATIENT
Start: 2023-08-11 | End: 2023-08-15 | Stop reason: HOSPADM

## 2023-08-11 RX ORDER — NALOXONE HYDROCHLORIDE 0.4 MG/ML
0.4 INJECTION, SOLUTION INTRAMUSCULAR; INTRAVENOUS; SUBCUTANEOUS
Status: DISCONTINUED | OUTPATIENT
Start: 2023-08-11 | End: 2023-08-15 | Stop reason: HOSPADM

## 2023-08-11 RX ORDER — LIDOCAINE HYDROCHLORIDE AND EPINEPHRINE 15; 5 MG/ML; UG/ML
3 INJECTION, SOLUTION EPIDURAL
Status: DISCONTINUED | OUTPATIENT
Start: 2023-08-11 | End: 2023-08-12 | Stop reason: HOSPADM

## 2023-08-11 RX ORDER — NALOXONE HYDROCHLORIDE 0.4 MG/ML
0.2 INJECTION, SOLUTION INTRAMUSCULAR; INTRAVENOUS; SUBCUTANEOUS
Status: DISCONTINUED | OUTPATIENT
Start: 2023-08-11 | End: 2023-08-15 | Stop reason: HOSPADM

## 2023-08-11 RX ORDER — SODIUM CHLORIDE, SODIUM LACTATE, POTASSIUM CHLORIDE, CALCIUM CHLORIDE 600; 310; 30; 20 MG/100ML; MG/100ML; MG/100ML; MG/100ML
INJECTION, SOLUTION INTRAVENOUS CONTINUOUS PRN
Status: DISCONTINUED | OUTPATIENT
Start: 2023-08-11 | End: 2023-08-12 | Stop reason: HOSPADM

## 2023-08-11 RX ORDER — NALBUPHINE HYDROCHLORIDE 20 MG/ML
2.5-5 INJECTION, SOLUTION INTRAMUSCULAR; INTRAVENOUS; SUBCUTANEOUS EVERY 6 HOURS PRN
Status: DISCONTINUED | OUTPATIENT
Start: 2023-08-11 | End: 2023-08-12

## 2023-08-11 RX ORDER — FENTANYL/ROPIVACAINE/NS/PF 2MCG/ML-.1
PLASTIC BAG, INJECTION (ML) EPIDURAL
Status: DISCONTINUED | OUTPATIENT
Start: 2023-08-11 | End: 2023-08-12 | Stop reason: HOSPADM

## 2023-08-11 RX ORDER — FENTANYL CITRATE-0.9 % NACL/PF 10 MCG/ML
100 PLASTIC BAG, INJECTION (ML) INTRAVENOUS EVERY 5 MIN PRN
Status: DISCONTINUED | OUTPATIENT
Start: 2023-08-11 | End: 2023-08-12 | Stop reason: HOSPADM

## 2023-08-11 RX ORDER — BUPIVACAINE HYDROCHLORIDE 2.5 MG/ML
INJECTION, SOLUTION EPIDURAL; INFILTRATION; INTRACAUDAL
Status: COMPLETED | OUTPATIENT
Start: 2023-08-11 | End: 2023-08-11

## 2023-08-11 RX ORDER — SODIUM CHLORIDE 9 MG/ML
INJECTION, SOLUTION INTRAVENOUS CONTINUOUS
Status: DISCONTINUED | OUTPATIENT
Start: 2023-08-11 | End: 2023-08-12

## 2023-08-11 RX ORDER — LIDOCAINE 40 MG/G
CREAM TOPICAL
Status: CANCELLED | OUTPATIENT
Start: 2023-08-11

## 2023-08-11 RX ORDER — LIDOCAINE 40 MG/G
CREAM TOPICAL
Status: DISCONTINUED | OUTPATIENT
Start: 2023-08-11 | End: 2023-08-12 | Stop reason: HOSPADM

## 2023-08-11 RX ORDER — CLINDAMYCIN PHOSPHATE 900 MG/50ML
900 INJECTION, SOLUTION INTRAVENOUS
Status: CANCELLED | OUTPATIENT
Start: 2023-08-11

## 2023-08-11 RX ORDER — NIFEDIPINE 10 MG/1
10-20 CAPSULE ORAL
Status: DISCONTINUED | OUTPATIENT
Start: 2023-08-11 | End: 2023-08-15 | Stop reason: HOSPADM

## 2023-08-11 RX ORDER — FENTANYL CITRATE-0.9 % NACL/PF 10 MCG/ML
PLASTIC BAG, INJECTION (ML) INTRAVENOUS
Status: COMPLETED
Start: 2023-08-11 | End: 2023-08-12

## 2023-08-11 RX ORDER — OXYTOCIN/0.9 % SODIUM CHLORIDE 30/500 ML
1-24 PLASTIC BAG, INJECTION (ML) INTRAVENOUS CONTINUOUS
Status: DISCONTINUED | OUTPATIENT
Start: 2023-08-11 | End: 2023-08-12 | Stop reason: HOSPADM

## 2023-08-11 RX ADMIN — Medication: at 19:15

## 2023-08-11 RX ADMIN — SODIUM CHLORIDE: 9 INJECTION, SOLUTION INTRAVENOUS at 23:51

## 2023-08-11 RX ADMIN — TACROLIMUS 4 MG: 1 CAPSULE ORAL at 11:26

## 2023-08-11 RX ADMIN — URSODIOL 300 MG: 300 CAPSULE ORAL at 08:27

## 2023-08-11 RX ADMIN — Medication 2 MILLI-UNITS/MIN: at 06:57

## 2023-08-11 RX ADMIN — MAGNESIUM OXIDE TAB 400 MG (241.3 MG ELEMENTAL MG) 800 MG: 400 (241.3 MG) TAB at 08:25

## 2023-08-11 RX ADMIN — CEFTAZIDIME 1 G: 1 INJECTION, POWDER, FOR SOLUTION INTRAMUSCULAR; INTRAVENOUS at 21:42

## 2023-08-11 RX ADMIN — ACETAMINOPHEN 650 MG: 325 TABLET, FILM COATED ORAL at 18:26

## 2023-08-11 RX ADMIN — AZATHIOPRINE 150 MG: 50 TABLET ORAL at 23:00

## 2023-08-11 RX ADMIN — Medication 50 MCG: at 08:24

## 2023-08-11 RX ADMIN — PRENATAL VITAMINS-IRON FUMARATE 27 MG IRON-FOLIC ACID 0.8 MG TABLET 1 TABLET: at 21:41

## 2023-08-11 RX ADMIN — SODIUM CHLORIDE: 9 INJECTION, SOLUTION INTRAVENOUS at 07:01

## 2023-08-11 RX ADMIN — SODIUM BICARBONATE 650 MG TABLET 1300 MG: at 08:25

## 2023-08-11 RX ADMIN — CEFTAZIDIME 1 G: 1 INJECTION, POWDER, FOR SOLUTION INTRAMUSCULAR; INTRAVENOUS at 04:55

## 2023-08-11 RX ADMIN — DOCUSATE SODIUM 100 MG: 100 CAPSULE, LIQUID FILLED ORAL at 21:41

## 2023-08-11 RX ADMIN — TACROLIMUS 4 MG: 1 CAPSULE ORAL at 22:59

## 2023-08-11 RX ADMIN — NIFEDIPINE 60 MG: 30 TABLET, FILM COATED, EXTENDED RELEASE ORAL at 18:08

## 2023-08-11 RX ADMIN — NIFEDIPINE 10 MG: 10 CAPSULE ORAL at 18:42

## 2023-08-11 RX ADMIN — ASPIRIN 81 MG CHEWABLE TABLET 81 MG: 81 TABLET CHEWABLE at 08:25

## 2023-08-11 RX ADMIN — DOCUSATE SODIUM 100 MG: 100 CAPSULE, LIQUID FILLED ORAL at 08:27

## 2023-08-11 RX ADMIN — SODIUM CHLORIDE: 9 INJECTION, SOLUTION INTRAVENOUS at 16:19

## 2023-08-11 RX ADMIN — NIFEDIPINE 60 MG: 30 TABLET, FILM COATED, EXTENDED RELEASE ORAL at 06:30

## 2023-08-11 RX ADMIN — BUPIVACAINE HYDROCHLORIDE 10 ML: 2.5 INJECTION, SOLUTION EPIDURAL; INFILTRATION; INTRACAUDAL at 18:55

## 2023-08-11 RX ADMIN — SODIUM BICARBONATE 650 MG TABLET 1300 MG: at 14:00

## 2023-08-11 RX ADMIN — LEVOTHYROXINE SODIUM 50 MCG: 25 TABLET ORAL at 08:21

## 2023-08-11 RX ADMIN — SODIUM BICARBONATE 650 MG TABLET 1300 MG: at 21:41

## 2023-08-11 RX ADMIN — CYANOCOBALAMIN TAB 1000 MCG 1000 MCG: 1000 TAB at 08:24

## 2023-08-11 RX ADMIN — CEFTAZIDIME 1 G: 1 INJECTION, POWDER, FOR SOLUTION INTRAMUSCULAR; INTRAVENOUS at 13:05

## 2023-08-11 RX ADMIN — URSODIOL 300 MG: 300 CAPSULE ORAL at 21:42

## 2023-08-11 RX ADMIN — URSODIOL 300 MG: 300 CAPSULE ORAL at 14:00

## 2023-08-11 ASSESSMENT — ACTIVITIES OF DAILY LIVING (ADL)
ADLS_ACUITY_SCORE: 20

## 2023-08-11 ASSESSMENT — ENCOUNTER SYMPTOMS
SEIZURES: 1
ORTHOPNEA: 0

## 2023-08-11 ASSESSMENT — LIFESTYLE VARIABLES: TOBACCO_USE: 0

## 2023-08-11 NOTE — PROGRESS NOTES
Nephrology Progress Note  08/11/2023       History of Present Illness  Mona Wagner is a 32 year old female past medical history of ESKD of unclear etiology, status post DDKT in 2019, CKD, high risk pregnancy admitted for increased contractions.  On admission noted to have creatinine elevation from baseline of 1.3-1 0.4 to 1.6 and also noted to have hyperkalemia at potassium of 5.5, repeat was 6.1.  There is associated metabolic acidosis as well.  Patient endorsed having pain in her right lower quadrant that is radiating into her groin and contractions at the same time. UA was dirty and culture was positive.      Interval History :   Provider and nursing notes from past 24 hour reviewed. Induction was started this morning with pitocin. She notes some blood in the nephrostomy tube/bag. Her potassium is at baseline and her Scr is slightly better. Her  BP's have been in the 130s/ and nifedipine was increased to 60 mg BID. She has no fevers/chills. She has minimal LE edema.       ASSESSMENT/RECOMMENDATIONS    # DDKT: Trend up              - Baseline Creatinine: ~ 1.2-1.4 with pregnancy, Pre-pregnancy was 0.8-1.1               - Proteinuria: Mild (0.5-1.0 grams), stated to trend up since July 2023              - Date DSA Last Checked: -Sep/2022      Latest DSA: No, cPRA : 25%              - BK Viremia: No              - Kidney Tx Biopsy: Sep 17, 2019; Result: No diagnostic evidence of acute rejection.   She has several issues impacting her kidney function. She developed ureteral stenosis (PNT's now), a UTI most recently (resolved) but we have also seen progression of her underlying IgA as evidenced by her increasing proteinuria.     She started induction today. I do not have reason to believe that labor will harm her transplanted kidney and proceeding with plans for a vaginal birth are expected.     She is concerned regarding her perc neph tubes and occ bleeding with straining. I do not have concerns. Bleeding can be seen  and occasionally can cause a clot that prevents the tube from draining. IR can manage that. It can take weeks for the kidneys to recover to the point of not needing a perc neph after delivery. She should see IR on the 14th as planned. They will do an antegrade study to see if she does indeed still need them. If she does not they will remove them. If, as I suspect, she still does, they will leave them in and tx nephrology can arrange the next follow up when they see her post partum.        # Immunosuppression: Tacrolimus immediate release (goal 4-6) and Azathioprine (dose 150 mg daily)              - Patient is in an immunosuppressed state and will continue to monitor for efficacy and toxicity of immunosuppression medications.              - Changes: Not at this time, recent Tac level is 5.9. No changes  now   - will need to monitor TAC levels after delivery as I expect them to increase and we will need to again change her dose.   - once she delivers, stop azathioprine and resume mycophenolic acid 540 mg BID. This can be done as soon as she delivers    #Hyperkalemia   - K usually ~ 5, up to 5.6, back to baseline today at 4.9  - recommend treating hyperkalemia with valtessa or low dose lokelma (lokelma does work faster).   - recheck K 6 hours after treating         # Ureteral Stenosis/Hydronephrosis: Patient with moderate hydronephrosis of kidney transplant and developed CAMERON.  She underwent PNT 6/9/23 with repeat kidney transplant ultrasound 6/11 still showing moderate hydronephrosis suggesting this may be a functional hydronephrosis from pregnancy.  However, patient does report resolution of edema and dyspnea, as well as apparent post-obstructive diuresis.  She has a previous h/o ureteral stenosis with ureteral stent removed 2/2021.  Previous kidney transplant ultrasound 7/2021 also showed moderate hydronephrosis unchanged with voiding. Hydronephrosis was unchanged on 7/6/22 abdominal CT.                -Most recent  U/S 7/5/23 with improvement in hydronephrosis with PNT in place              -now making some urine through urethra              -she will continue to have PCN so long as she is draining through it and likely for 4 weeks after delivery   - Neph tube exchange planned on 8/14, will keep scheduled for now   - removal of PCN tubes depends on post partum course but will likely need them at last a week after delivery          Post delivery pharmacologic management: She does not plan to breast feed. Prior to her pregnancy she was on myfortic and expect we will transition to that post delivery. If her blood pressure remains elevated to the point of treatment we can use any agents (again because she does not intend to breast feed).     Recommendations were communicated to primary team via in person.     Seen and discussed with Dr. Oliver.     ROGER STRAUSS PA-C       Attestation:  I, Dr. Briana Oliver, saw and evaluated Mona Wagner today as pat of a shared visit. I have reviewed and discussed with the NPP their history, physical exam and plan.     I personally reviewed major complaints, physical findings, investigations, medications, lab values, vital signs, I/O's and the overall management plan.      I personally performed a history, exam and agree with the assessment and plan as written.     My Key management decisions are included in the note in bold.       Briana Oliver MD MS FNKF August 11, 2023              Review of Systems:   Induction began this morning, She is feeling okay.  having no pain at PNT's  No sob  No chest pain      Physical Exam:   I/O last 3 completed shifts:  In: 2590 [P.O.:2590]  Out: 3125 [Urine:3125]  /74  Pulse 78  Resp 16 SpO2 99%  Wt 80.3 kg  GENERAL APPEARANCE: alert and no distress  EYES:  no scleral icterus, pupils equal  PULM: no respiratory distress   CV: RRR      -edema trace   MS: no evidence of inflammation in joints, no muscle tenderness  NEURO: mentation intact and speech  normal       Labs:   All labs reviewed by me  Electrolytes/Renal -   Recent Labs   Lab Test 08/11/23 0745 08/10/23  0934 08/09/23  1357 07/17/23  1024 07/13/23  1015 07/02/23  1704 06/29/23  1300 06/22/23  1007 06/19/23  1028 06/16/23  0723 06/15/23  1009 06/12/23  0640 06/12/23  0235    137 136   < > 135*   < >  --    < > 133*   < > 133*   < > 133*   POTASSIUM 4.9 5.1 4.7   < > 5.0   < >  --    < > 5.2   < > 4.8   < > 5.7*   CHLORIDE 108* 106 105   < > 104   < >  --    < > 103   < > 100   < > 103   CO2 20* 20* 21*   < > 21*   < >  --    < > 19*   < > 20*   < > 17*   BUN 22.6* 27.8* 26.1*   < > 29.8*   < >  --    < > 34.4*   < > 27.6*   < > 26.1*   CR 1.12* 1.35* 1.27*   < > 1.38*   < >  --    < > 1.30*   < > 1.25*   < > 1.30*   GLC 89 91 169*   < > 94   < >  --    < > 89   < > 138*   < > 119*   SHAMIR 9.1 8.9 9.0   < > 9.3   < >  --    < > 9.7   < > 9.6   < > 9.8   MAG  --   --   --   --  2.3  --  2.0  --  1.7   < > 1.3*   < > 1.6*   PHOS  --   --   --   --  3.3  --   --   --   --   --  3.6  --  2.6    < > = values in this interval not displayed.       CBC -   Recent Labs   Lab Test 08/11/23  0745 08/10/23  0934 08/09/23  0736   WBC 9.2 8.6 8.8   HGB 10.8* 10.5* 10.3*    181 190       LFTs -   Recent Labs   Lab Test 08/11/23  0745 08/10/23  0934 08/09/23  1357   ALKPHOS 134* 124* 124*   BILITOTAL 1.0 1.0 1.1   ALT 35 38 42   AST 33 33 38   PROTTOTAL 6.6 6.3* 6.1*   ALBUMIN 3.2* 3.1* 3.0*       Iron Panel -   Recent Labs   Lab Test 07/31/23  1017 05/22/23  1023 05/22/23  1022 04/25/23  1035   IRON 94  --  80 49   IRONSAT 35  --  41 27   MONET 1,525*   < >  --  1,770*    < > = values in this interval not displayed.         Imaging:  Reviewed      Current Medications:   aspirin  81 mg Oral Daily    azaTHIOprine  150 mg Oral QPM    cefTAZidime  1 g Intravenous Q8H    cyanocobalamin  1,000 mcg Oral QAM    docusate sodium  100 mg Oral BID    levothyroxine  50 mcg Oral Daily    magnesium oxide  800 mg Oral QAM     NIFEdipine ER OSMOTIC  60 mg Oral Q12H    prenatal multivitamin w/iron  1 tablet Oral QPM    sodium bicarbonate  1,300 mg Oral TID    sodium chloride (PF)  3 mL Intracatheter Q8H    tacrolimus  4 mg Oral BID    ursodiol  300 mg Oral TID    vitamin D3  50 mcg Oral QAM      lactated ringers      - MEDICATION INSTRUCTIONS -      oxytocin in 0.9% NaCl 4 sabi-units/min (08/11/23 0830)    sodium chloride 125 mL/hr at 08/11/23 0701       BOB GARCIAC

## 2023-08-11 NOTE — PROGRESS NOTES
[facility]  Labor Progress Note    S: Patient is doing well. Just ate lunch. Starting to feel some pelvic pressure. She is hoping to have her cervix checked. Patient and her partner also have questions about AROM and epidural timing.     O:   Patient Vitals for the past 4 hrs:   BP Temp Temp src Resp   23 1401 (!) 145/95 98.1  F (36.7  C) Oral 18   23 1235 136/82 97.7  F (36.5  C) Oral 16   23 1130 124/74 97.9  F (36.6  C) Oral 16     Gen: alert, NAD, breathing through contractions  SVE: 490/-2    FHT: Baseline 140, moderate variability, accelerations present, no decelerations   [place]: 2 contractions in 10 minutes    A/P:  Ms. Mona Wagner is a 32 year old  at 34w0d by LMP c/w 8w4d, here for IOL after antepartum stay. History notable for right renal transplant w/ PNT being treated for pyelonephritis and superimposed preE w/o SF. Labor is progressing well. Will continue on pitocin for now and consider AROM at next check. Encouraged patient to try different positions to get baby engaged in the pelvis.     Labor:   - Augmentation: on pitocin 8 units currently, continue titrating to contractions   - Monitoring: external   - GBS negative  - Pain control: planning on epidural sometime after AROM/next cervical check   - Labor Preferences: baby gently placed on abdomen under a blanket, dad to cut umbilical cord. If CS needed discussed VML incision, would not do CS STAT due to concerns about renal injury and difficult airway.   - Plan: Continue on pitocin at 8u. Reassess in 2-3 hours for possible AROM    FWB:   - Category I FHT    See [facility] note for details of other medical issues    Jenifer Toledo, MS4    Resident/Fellow Attestation   I, Viki Castro, was present with the medical student who participated in the service and in the documentation of the note.  I have verified the history and personally performed the physical exam and medical decision making.  I agree with the  assessment and plan of care as documented in the note.  I have reviewed the note and made changes as necessary.    Viki Castro DO, MS  Obstetrics, Gynecology & Women's Health   Resident, PGY-3  08/11/2023 3:51 PM

## 2023-08-11 NOTE — PROGRESS NOTES
[facility]  Labor Progress Note    S: Patient doing well. Feeling period-like cramps.     O:   Patient Vitals for the past 4 hrs:   BP Temp Temp src Resp   23 0805 139/83 98.4  F (36.9  C) Oral 16   23 0706 132/84 98.3  F (36.8  C) Oral 18     FHT: Baseline 140, moderate variability, accelerations present, no decelerations  [place]: 2 contractions in 10 minutes    A/P:  Ms. Mona Wagner is a 32 year old  at 34w0d by LMP c/w 8w4d, here for IOL after antepartum stay. History notable for right renal transplant w/ PNT being treated for pyelonephritis and superimposed preE w/o SF.     Labor: - on pitocin, continue titrating to contractions    - Monitoring: external    - GBS negative   - Pain control: thinking about an epidural     See MFM note for details of other medical issues    Discussed delivery plan. Baby gently placed on abdomen under a blanket, dad to cut umbilical cord. If CS needed discussed VML incision, would not do CS STAT due to concerns about renal injury and difficult airway.     FWB: - Category I FHT    Tammie Johnson MD

## 2023-08-11 NOTE — PROGRESS NOTES
Data: Maternal status vital signs stable except for slightly elevated B/P (See flow sheets) RN/Provider notified and will continue to monitor. Afebrile. Signs and symptoms of infection not present. Denies any leaking of fluids and vaginal bleeding. Patient denies any visual changes, headaches, or epigastric pain. See flow sheets for details on fetal heart rate tracings and uterine activity. Patient feeling uncomfortable, but declines medications at this time. Using birthing ball/hot packs for interventions. NICU was consulted.   Action/interventions: Encourage voiding, hydration, and repositioning.   Plan: Continue expectant management. Observe for and notify care provider of indicators of progressing labor, signs/symptoms of infection, fetal/maternal compromise. Continue with plan of care. Report given to [place] WADSWORTH.

## 2023-08-11 NOTE — CONSULTS
_       [facility]                      Neonatology Advanced Practice Antepartum Counseling Consult      I was asked to provide antepartum counseling for Mona Wagner at the request of Corinna Dunn MD secondary to induction of labor at 34 0/7 weeks. She was initially consulted at 25 weeks. 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 She was previously hospitalized at 28 weeks due to pyelonephritis and the need for IV antibiotics. She was admitted again on 8/3 for worsening renal function, pyelonephritis, intrahepatic cholestasis of pregnancy, and possible  labor. She has a nephrostomy tube and had a new onset of hematuria overnight. Betamethasone was administered on 6/10 and , with a rescue dose given  and . Ms. Wagner, accompanied by her partner, was counseled on the expected hospital course, potential risks, and outcomes associated with an infant born at approximately 34 weeks gestation. The counseling included: initial delivery room stabilization, respiratory course, lung development, RDS, hyperbilirubinemia, hemodynamic support, infection, nutrition, growth and development, and long term outcomes. Informed mother that the incidence of IVH, ROP, and NEC have been greatly reduced now that she is 34 weeks. Please feel free to call with any additional questions or concerns.          Lakshmi Taylor CNP, NNP-BC, 23 9:47 AM     Advanced Practice Service    Intensive Care Unit  [facility]      Floor Time (min): 5  Face to Face Time (min): 25  Total Time (minutes): 30  More than 50% of my time was spent in direct, face to face, antepartum counseling with the above  patient.

## 2023-08-11 NOTE — ANESTHESIA PREPROCEDURE EVALUATION
Anesthesia Pre-Procedure Evaluation    Patient: Mona Wagner   MRN: [id number] : 1990        Procedure :           Past Medical History:   Diagnosis Date    Anemia in chronic kidney disease     Bacteremia 2023    Difficult intubation     see 23 anesthesia notes    History of bacterial endocarditis     History of blood transfusion     History of DVT (deep vein thrombosis)     History of seizure     History of subarachnoid hemorrhage     Hydronephrosis     Hypothyroidism     Kidney transplanted 2019    DCD DDKT. Induction with thymo 6mg/kg.    Orthostatic hypotension     FATOUMATA (obstructive sleep apnea)     Pyelonephritis of transplanted kidney 2020    Secondary renal hyperparathyroidism (H)     Urinary tract infection       Past Surgical History:   Procedure Laterality Date    BENCH KIDNEY N/A 8/3/2019    Procedure: BACKBENCH PREPARATION, ALLOGRAFT, KIDNEY;  Surgeon: Dorian Johnson MD;  Location: [facility] OR    COMBINED CYSTOSCOPY, RETROGRADES, EXCHANGE STENT URETER(S) Right 2020    Procedure: CYSTOSCOPY, CYSOTGRAM, WITH RETROGRADE PYELOGRAM, ureteroscopy,  AND URETERAL STENT;  Surgeon: Wally Britt MD;  Location: [facility] OR    COMBINED CYSTOSCOPY, RETROGRADES, URETEROSCOPY, LASER HOLMIUM LITHOTRIPSY URETER(S), INSERT STENT N/A 2019    Procedure: CYSTOURETEROSCOPY, WITH RETROGRADE PYELOGRAM of transplant kidney, STENT INSERTION, Laser on standby;  Surgeon: Wally Britt MD;  Location: [facility] OR    CREATE FISTULA ARTERIOVENOUS UPPER EXTREMITY  2014    Procedure: CREATE FISTULA ARTERIOVENOUS UPPER EXTREMITY;  Left Wrist Arteriovenous Fistula Placement;  Surgeon: Shashi Castro MD;  Location: [facility] OR    CREATE FISTULA ARTERIOVENOUS UPPER EXTREMITY      CYSTOSCOPY, RETROGRADES, INSERT STENT URETER(S), COMBINED N/A 2020    Procedure: CYSTOSCOPY, WITH RETROGRADE PYELOGRAM, CYSTOGRAM AND URETERAL STENT INSERTION - TRANSPLANT KIDNEY;  Surgeon: Wally Britt,  "MD;  Location: [facility] OR    IR CEREBRAL ANGIOGRAM  2014    IR NEPHROSTOMY TUBE PLACEMENT RIGHT  2023    PARATHYROIDECTOMY Bilateral 2023    Procedure: Bilateral Resection of 3 and 1/2 parathyroid glands;  Surgeon: Melissa Jones MD;  Location: [facility] OR    PERCUTANEOUS BIOPSY KIDNEY Right 2019    Procedure: Right Kidney Biopsy;  Surgeon: Deny Rios MD;  Location: [facility] OR    RASTA/DIALYSIS CATHETER  12/10/2013         TRANSPLANT KIDNEY RECIPIENT  DONOR N/A 8/3/2019    Procedure: TRANSPLANT, KIDNEY, RECIPIENT,  DONOR with Ureteral Stent Placement;  Surgeon: Dorian Johnson MD;  Location: [facility] OR      Allergies   Allergen Reactions    Contrast Dye Rash     CT contrast allergy 19 rash over eyes. Need to have pre medication before a CT WITH CONTRAST     Diatrizoate Rash     CT contrast allergy 19 rash over eyes. Need to have pre medication before a CT WITH CONTRAST     Lisinopril Swelling     angioedema    Nitrous Oxide Other (See Comments)     Sense of doom    Chlorhexidine Rash     Rash at site    Sulfa Antibiotics Rash     Muscle stiffness of neck    Dapsone      Methemoglobinemia    Furosemide Other (See Comments)     Skin flushing    Metrogel [Metronidazole]      Hives diffusely on body after vaginal administration.    Azithromycin Dizziness and Rash    Cefuroxime Rash      Social History     Tobacco Use    Smoking status: Never    Smokeless tobacco: Never   Substance Use Topics    Alcohol use: Never      Wt Readings from Last 1 Encounters:   23 80.3 kg (177 lb 1.6 oz)        Anesthesia Evaluation   Pt has had prior anesthetic. Type: General.    History of anesthetic complications  - .  reports having pre-op anesthesia in the OR after being \"given gas\" without any prior sedation or anxiety medication.  The OR staff apparently wasn't ready to proceed so she got anxious when she felt the gas was given too early and she seemed to be too aler.    ROS/MED " HX  ENT/Pulmonary:     (+) sleep apnea, mild, uses CPAP,         allergic rhinitis,                         (-) tobacco use and recent URI   Neurologic: Comment: SAH secondary to warfarin in  - neg neurologic ROS   (+)       seizures, last seizure: , features: secondary to SAH,                       Cardiovascular:  - neg cardiovascular ROS   (+)  - -   -  - -                                 Previous cardiac testing   Echo: Date: 3/2023 Results:    Stress Test:  Date: Results:  See H&P  ECG Reviewed:  Date: Results:    Cath:  Date: Results:   (-) ZEPEDA and orthopnea/PND   METS/Exercise Tolerance: >4 METS Comment: Doesn't exercise purposefully, but 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  Also notes she can walk several blocks on a flat surface.    Notes she does get some SOB with heavier exertion due to being overweight.   Hematologic:     (+) History of blood clots,    pt is not anticoagulated, anemia, history of blood transfusion, no previous transfusion reaction,        Musculoskeletal: Comment: Reports that has been getting some joint pain in the knees and shoulders secondary to her high calcium levels.      GI/Hepatic:     (+) GERD, Asymptomatic on medication,                  Renal/Genitourinary: Comment: IgA nephropathy    (+) renal disease,  Pt does not require dialysis,  Pt has history of transplant, date: 8/3/2019,        Endo: Comment: hyperparathyroidism    (+)          thyroid problem, hypothyroidism,           Psychiatric/Substance Use:     (+) psychiatric history anxiety       Infectious Disease:  - neg infectious disease ROS  (-) Recent Fever   Malignancy:  - neg malignancy ROS     Other: Comment:  at 34 weeks gestation     (+) Possibly pregnant, , ,         Physical Exam    Airway        Mallampati: II   TM distance: > 3 FB   Neck ROM: full   Mouth opening: > 3 cm    Respiratory Devices and Support         Dental  no notable dental history         Cardiovascular    cardiovascular exam normal          Pulmonary   pulmonary exam normal                OUTSIDE LABS:  CBC:   Lab Results   Component Value Date    WBC 9.2 08/11/2023    WBC 8.6 08/10/2023    HGB 10.8 (L) 08/11/2023    HGB 10.5 (L) 08/10/2023    HCT 33.2 (L) 08/11/2023    HCT 31.7 (L) 08/10/2023     08/11/2023     08/10/2023     BMP:   Lab Results   Component Value Date     08/11/2023     08/10/2023    POTASSIUM 4.9 08/11/2023    POTASSIUM 5.1 08/10/2023    CHLORIDE 108 (H) 08/11/2023    CHLORIDE 106 08/10/2023    CO2 20 (L) 08/11/2023    CO2 20 (L) 08/10/2023    BUN 22.6 (H) 08/11/2023    BUN 27.8 (H) 08/10/2023    CR 1.12 (H) 08/11/2023    CR 1.35 (H) 08/10/2023    GLC 89 08/11/2023    GLC 91 08/10/2023     COAGS:   Lab Results   Component Value Date    PTT 31 06/12/2023    INR 1.06 06/12/2023    FIBR 609 (H) 06/12/2023     POC:   Lab Results   Component Value Date     (H) 11/23/2020    HCG Positive (A) 01/16/2023    HCGS Negative 06/22/2022     HEPATIC:   Lab Results   Component Value Date    ALBUMIN 3.2 (L) 08/11/2023    PROTTOTAL 6.6 08/11/2023    ALT 35 08/11/2023    AST 33 08/11/2023    ALKPHOS 134 (H) 08/11/2023    BILITOTAL 1.0 08/11/2023    BILIDIRECT 0.2 12/03/2013     OTHER:   Lab Results   Component Value Date    PH 7.36 07/10/2023    LACT 1.0 07/02/2023    A1C 5.2 02/06/2023    SHAMIR 9.1 08/11/2023    PHOS 3.3 07/13/2023    MAG 2.3 07/13/2023    LIPASE 39 06/22/2022    TSH 1.24 07/31/2023    T4 1.24 07/31/2023    T3 207 (H) 02/28/2023    CRP 26.0 (H) 01/23/2020    SED 29 (H) 01/23/2020       Anesthesia Plan    ASA Status:  3       Anesthesia Type: Epidural.              Consents    Anesthesia Plan(s) and associated risks, benefits, and realistic alternatives discussed. Questions answered and patient/representative(s) expressed understanding.     - Discussed:     - Discussed with:  Patient, Spouse            Postoperative Care            Comments:                Lora Castro,  MD

## 2023-08-11 NOTE — PLAN OF CARE
VSS, afebrile. Denies headache, vision changes, and RUQ pain. Reports occasional contractions throughout the night. Denies LOF and bleeding. IOL started this morning with Pitocin. Repeat NICU consult this AM. Patient agreeable to plan. See flowsheet for FHR and uterine activity. Call light within reach. Continue with plan of care.

## 2023-08-12 ENCOUNTER — MEDICAL CORRESPONDENCE (OUTPATIENT)
Dept: HEALTH INFORMATION MANAGEMENT | Facility: CLINIC | Age: 33
End: 2023-08-12

## 2023-08-12 LAB
ALBUMIN SERPL BCG-MCNC: 3.2 G/DL (ref 3.5–5.2)
ALP SERPL-CCNC: 132 U/L (ref 35–104)
ALT SERPL W P-5'-P-CCNC: 38 U/L (ref 0–50)
ANION GAP SERPL CALCULATED.3IONS-SCNC: 11 MMOL/L (ref 7–15)
AST SERPL W P-5'-P-CCNC: 38 U/L (ref 0–45)
BILIRUB SERPL-MCNC: 1.1 MG/DL
BUN SERPL-MCNC: 22.7 MG/DL (ref 6–20)
CALCIUM SERPL-MCNC: 8.7 MG/DL (ref 8.6–10)
CHLORIDE SERPL-SCNC: 109 MMOL/L (ref 98–107)
CREAT SERPL-MCNC: 1.27 MG/DL (ref 0.51–0.95)
DEPRECATED HCO3 PLAS-SCNC: 19 MMOL/L (ref 22–29)
ERYTHROCYTE [DISTWIDTH] IN BLOOD BY AUTOMATED COUNT: 14.8 % (ref 10–15)
GFR SERPL CREATININE-BSD FRML MDRD: 58 ML/MIN/1.73M2
GLUCOSE SERPL-MCNC: 82 MG/DL (ref 70–99)
HCT VFR BLD AUTO: 34 % (ref 35–47)
HGB BLD-MCNC: 11.1 G/DL (ref 11.7–15.7)
HOLD SPECIMEN: NORMAL
MCH RBC QN AUTO: 31.8 PG (ref 26.5–33)
MCHC RBC AUTO-ENTMCNC: 32.6 G/DL (ref 31.5–36.5)
MCV RBC AUTO: 97 FL (ref 78–100)
PLATELET # BLD AUTO: 201 10E3/UL (ref 150–450)
POTASSIUM SERPL-SCNC: 5.3 MMOL/L (ref 3.4–5.3)
PROT SERPL-MCNC: 6.6 G/DL (ref 6.4–8.3)
RBC # BLD AUTO: 3.49 10E6/UL (ref 3.8–5.2)
SODIUM SERPL-SCNC: 139 MMOL/L (ref 136–145)
WBC # BLD AUTO: 14 10E3/UL (ref 4–11)

## 2023-08-12 PROCEDURE — 250N000011 HC RX IP 250 OP 636: Mod: JZ

## 2023-08-12 PROCEDURE — 258N000003 HC RX IP 258 OP 636

## 2023-08-12 PROCEDURE — 250N000013 HC RX MED GY IP 250 OP 250 PS 637: Performed by: OBSTETRICS & GYNECOLOGY

## 2023-08-12 PROCEDURE — 722N000001 HC LABOR CARE VAGINAL DELIVERY SINGLE

## 2023-08-12 PROCEDURE — 250N000013 HC RX MED GY IP 250 OP 250 PS 637

## 2023-08-12 PROCEDURE — 250N000011 HC RX IP 250 OP 636: Mod: JZ | Performed by: OBSTETRICS & GYNECOLOGY

## 2023-08-12 PROCEDURE — 250N000012 HC RX MED GY IP 250 OP 636 PS 637

## 2023-08-12 PROCEDURE — 80197 ASSAY OF TACROLIMUS: CPT | Performed by: INTERNAL MEDICINE

## 2023-08-12 PROCEDURE — 36415 COLL VENOUS BLD VENIPUNCTURE: CPT | Performed by: OBSTETRICS & GYNECOLOGY

## 2023-08-12 PROCEDURE — 250N000012 HC RX MED GY IP 250 OP 636 PS 637: Performed by: STUDENT IN AN ORGANIZED HEALTH CARE EDUCATION/TRAINING PROGRAM

## 2023-08-12 PROCEDURE — 59409 OBSTETRICAL CARE: CPT | Mod: GC | Performed by: STUDENT IN AN ORGANIZED HEALTH CARE EDUCATION/TRAINING PROGRAM

## 2023-08-12 PROCEDURE — 85027 COMPLETE CBC AUTOMATED: CPT | Performed by: OBSTETRICS & GYNECOLOGY

## 2023-08-12 PROCEDURE — 120N000002 HC R&B MED SURG/OB UMMC

## 2023-08-12 PROCEDURE — 99233 SBSQ HOSP IP/OBS HIGH 50: CPT | Performed by: INTERNAL MEDICINE

## 2023-08-12 PROCEDURE — 0KQM0ZZ REPAIR PERINEUM MUSCLE, OPEN APPROACH: ICD-10-PCS | Performed by: STUDENT IN AN ORGANIZED HEALTH CARE EDUCATION/TRAINING PROGRAM

## 2023-08-12 PROCEDURE — 80053 COMPREHEN METABOLIC PANEL: CPT

## 2023-08-12 PROCEDURE — 88307 TISSUE EXAM BY PATHOLOGIST: CPT | Mod: TC

## 2023-08-12 PROCEDURE — 250N000009 HC RX 250

## 2023-08-12 PROCEDURE — 99232 SBSQ HOSP IP/OBS MODERATE 35: CPT | Mod: GC | Performed by: OBSTETRICS & GYNECOLOGY

## 2023-08-12 RX ORDER — HYDROCORTISONE 25 MG/G
CREAM TOPICAL 3 TIMES DAILY PRN
Status: DISCONTINUED | OUTPATIENT
Start: 2023-08-12 | End: 2023-08-15 | Stop reason: HOSPADM

## 2023-08-12 RX ORDER — OXYTOCIN 10 [USP'U]/ML
10 INJECTION, SOLUTION INTRAMUSCULAR; INTRAVENOUS
Status: DISCONTINUED | OUTPATIENT
Start: 2023-08-12 | End: 2023-08-15 | Stop reason: HOSPADM

## 2023-08-12 RX ORDER — OXYTOCIN/0.9 % SODIUM CHLORIDE 30/500 ML
340 PLASTIC BAG, INJECTION (ML) INTRAVENOUS CONTINUOUS PRN
Status: DISCONTINUED | OUTPATIENT
Start: 2023-08-12 | End: 2023-08-15 | Stop reason: HOSPADM

## 2023-08-12 RX ORDER — MODIFIED LANOLIN
OINTMENT (GRAM) TOPICAL
Status: DISCONTINUED | OUTPATIENT
Start: 2023-08-12 | End: 2023-08-15 | Stop reason: HOSPADM

## 2023-08-12 RX ORDER — OXYTOCIN 10 [USP'U]/ML
INJECTION, SOLUTION INTRAMUSCULAR; INTRAVENOUS
Status: COMPLETED
Start: 2023-08-12 | End: 2023-08-12

## 2023-08-12 RX ORDER — OXYTOCIN/0.9 % SODIUM CHLORIDE 30/500 ML
100-340 PLASTIC BAG, INJECTION (ML) INTRAVENOUS CONTINUOUS PRN
Status: DISCONTINUED | OUTPATIENT
Start: 2023-08-12 | End: 2023-08-15 | Stop reason: HOSPADM

## 2023-08-12 RX ORDER — MISOPROSTOL 200 UG/1
400 TABLET ORAL
Status: DISCONTINUED | OUTPATIENT
Start: 2023-08-12 | End: 2023-08-15 | Stop reason: HOSPADM

## 2023-08-12 RX ORDER — DOCUSATE SODIUM 100 MG/1
100 CAPSULE, LIQUID FILLED ORAL DAILY
Status: DISCONTINUED | OUTPATIENT
Start: 2023-08-12 | End: 2023-08-15 | Stop reason: HOSPADM

## 2023-08-12 RX ORDER — ACETAMINOPHEN 325 MG/1
650 TABLET ORAL EVERY 4 HOURS PRN
Status: DISCONTINUED | OUTPATIENT
Start: 2023-08-12 | End: 2023-08-15 | Stop reason: HOSPADM

## 2023-08-12 RX ORDER — BISACODYL 10 MG
10 SUPPOSITORY, RECTAL RECTAL DAILY PRN
Status: DISCONTINUED | OUTPATIENT
Start: 2023-08-12 | End: 2023-08-15 | Stop reason: HOSPADM

## 2023-08-12 RX ORDER — MISOPROSTOL 200 UG/1
TABLET ORAL
Status: COMPLETED
Start: 2023-08-12 | End: 2023-08-12

## 2023-08-12 RX ORDER — MISOPROSTOL 200 UG/1
800 TABLET ORAL
Status: DISCONTINUED | OUTPATIENT
Start: 2023-08-12 | End: 2023-08-15 | Stop reason: HOSPADM

## 2023-08-12 RX ORDER — POLYETHYLENE GLYCOL 3350 17 G/17G
17 POWDER, FOR SOLUTION ORAL 2 TIMES DAILY PRN
Status: DISCONTINUED | OUTPATIENT
Start: 2023-08-12 | End: 2023-08-15 | Stop reason: HOSPADM

## 2023-08-12 RX ORDER — TRANEXAMIC ACID 10 MG/ML
1 INJECTION, SOLUTION INTRAVENOUS EVERY 30 MIN PRN
Status: DISCONTINUED | OUTPATIENT
Start: 2023-08-12 | End: 2023-08-15 | Stop reason: HOSPADM

## 2023-08-12 RX ORDER — CARBOPROST TROMETHAMINE 250 UG/ML
250 INJECTION, SOLUTION INTRAMUSCULAR
Status: DISCONTINUED | OUTPATIENT
Start: 2023-08-12 | End: 2023-08-15 | Stop reason: HOSPADM

## 2023-08-12 RX ORDER — SODIUM CHLORIDE 9 MG/ML
INJECTION, SOLUTION INTRAVENOUS
Status: COMPLETED
Start: 2023-08-12 | End: 2023-08-12

## 2023-08-12 RX ORDER — SODIUM BICARBONATE 650 MG/1
1300 TABLET ORAL 3 TIMES DAILY
Status: DISCONTINUED | OUTPATIENT
Start: 2023-08-12 | End: 2023-08-15 | Stop reason: HOSPADM

## 2023-08-12 RX ADMIN — LIDOCAINE HYDROCHLORIDE 20 ML: 10 INJECTION, SOLUTION EPIDURAL; INFILTRATION; INTRACAUDAL; PERINEURAL at 01:52

## 2023-08-12 RX ADMIN — ASPIRIN 81 MG CHEWABLE TABLET 81 MG: 81 TABLET CHEWABLE at 08:52

## 2023-08-12 RX ADMIN — LEVOTHYROXINE SODIUM 50 MCG: 25 TABLET ORAL at 08:51

## 2023-08-12 RX ADMIN — ACETAMINOPHEN 650 MG: 325 TABLET, FILM COATED ORAL at 08:52

## 2023-08-12 RX ADMIN — ACETAMINOPHEN 650 MG: 325 TABLET, FILM COATED ORAL at 18:13

## 2023-08-12 RX ADMIN — ACETAMINOPHEN 650 MG: 325 TABLET, FILM COATED ORAL at 14:25

## 2023-08-12 RX ADMIN — MYCOPHENOLIC ACID 540 MG: 180 TABLET, DELAYED RELEASE ORAL at 22:18

## 2023-08-12 RX ADMIN — URSODIOL 300 MG: 300 CAPSULE ORAL at 08:51

## 2023-08-12 RX ADMIN — SODIUM CHLORIDE: 9 INJECTION, SOLUTION INTRAVENOUS at 05:57

## 2023-08-12 RX ADMIN — TACROLIMUS 4 MG: 1 CAPSULE ORAL at 22:18

## 2023-08-12 RX ADMIN — ACETAMINOPHEN 650 MG: 325 TABLET, FILM COATED ORAL at 22:51

## 2023-08-12 RX ADMIN — ACETAMINOPHEN 650 MG: 325 TABLET, FILM COATED ORAL at 03:56

## 2023-08-12 RX ADMIN — SODIUM BICARBONATE 650 MG TABLET 1300 MG: at 14:34

## 2023-08-12 RX ADMIN — PRENATAL VITAMINS-IRON FUMARATE 27 MG IRON-FOLIC ACID 0.8 MG TABLET 1 TABLET: at 22:18

## 2023-08-12 RX ADMIN — TACROLIMUS 4 MG: 1 CAPSULE ORAL at 10:35

## 2023-08-12 RX ADMIN — NIFEDIPINE 60 MG: 30 TABLET, FILM COATED, EXTENDED RELEASE ORAL at 06:13

## 2023-08-12 RX ADMIN — CEFTAZIDIME 1 G: 1 INJECTION, POWDER, FOR SOLUTION INTRAMUSCULAR; INTRAVENOUS at 05:55

## 2023-08-12 RX ADMIN — FAMOTIDINE 20 MG: 20 TABLET ORAL at 14:33

## 2023-08-12 RX ADMIN — SIMETHICONE 125 MG: 125 TABLET, CHEWABLE ORAL at 12:42

## 2023-08-12 RX ADMIN — CEFTAZIDIME 1 G: 1 INJECTION, POWDER, FOR SOLUTION INTRAMUSCULAR; INTRAVENOUS at 14:54

## 2023-08-12 RX ADMIN — Medication 50 MCG: at 08:51

## 2023-08-12 RX ADMIN — CEFTAZIDIME 1 G: 1 INJECTION, POWDER, FOR SOLUTION INTRAMUSCULAR; INTRAVENOUS at 22:13

## 2023-08-12 RX ADMIN — SODIUM BICARBONATE 650 MG TABLET 1300 MG: at 22:18

## 2023-08-12 RX ADMIN — CYANOCOBALAMIN TAB 1000 MCG 1000 MCG: 1000 TAB at 08:51

## 2023-08-12 RX ADMIN — NIFEDIPINE 60 MG: 30 TABLET, FILM COATED, EXTENDED RELEASE ORAL at 18:13

## 2023-08-12 RX ADMIN — MAGNESIUM OXIDE TAB 400 MG (241.3 MG ELEMENTAL MG) 800 MG: 400 (241.3 MG) TAB at 08:50

## 2023-08-12 RX ADMIN — DOCUSATE SODIUM 100 MG: 100 CAPSULE, LIQUID FILLED ORAL at 08:51

## 2023-08-12 RX ADMIN — SODIUM BICARBONATE 650 MG TABLET 1300 MG: at 08:52

## 2023-08-12 ASSESSMENT — ACTIVITIES OF DAILY LIVING (ADL)
ADLS_ACUITY_SCORE: 20

## 2023-08-12 NOTE — PLAN OF CARE
Goal Outcome Evaluation:      Plan of Care Reviewed With: patient    Overall Patient Progress: improvingOverall Patient Progress: improving      0146 with NICU in attendance. VSS. QBL  91. 2nd degree laceration repaired. Placenta delivered without difficulty. Uterus firm, midline, at the umbilicus. Denies pain. Plan routine recovery cares and transfer to [facility] after visiting baby in NICU.

## 2023-08-12 NOTE — CONSULTS
"  DATA/ASSESSMENT    General Information  Assessment completed with: Parents, Mona and Spouse (Parents)  Type of visit: Initial Assessment      Reason for Consult: Patient support. Resources    Living Environment:   Primary caregiver: mother, father  Lives with: mother, father         Current living arrangements: apartment          Able to return to prior arrangements: Yes       Family Factors  Family Risk Factors: first time parents  Family Strength Factors: able and willing to advocate for self/family, able and willing to ask for help/accept help, parental employment, reliable transportation, stable housing, strong social support, willingness to havee vulnerable conversations about emotions     Assessment of Support  Parental Marital Status:   Who is your support system?: Parent(s) Description of Support System: Patients Mother and Father identified their parents as support systems as well as their siblings. Reports that support system is very supportive       Employment/Financial  Patient's caregiver works full/part time: Yes, Mona stated that she works part time at [employer] and reported that she will return to work after her maternity leave to work only weekends. Father stated that he works full time as a pharmacy tech.   Patient's caregiver able to return to work: yes           Coping/Stress  Mona acknowledged having a rough time with her pregnancy. She stated that she was \"in and out of the hospital\" She stated that she seemed to be in good spirits throughout pregnancy and denied any mental health symptoms. She also denied mental health symptoms throughout her life. SW and patient discussed what to do if she recognizes any PMAD signs or symptoms to discuss them with her PCP and or baby pediatrician. She seemed to understand this recommendation.            Additional Information:  Mona has a new baby boy (no name at this time). This is the first baby for Mona and her Spouse. They currently reside " in [place], [place]. Mona works part time at [employer] and reports she will return to work only working weekends after her maternity leave. Father of Baby works full time as a pharmacy tech. They reported that due to unexpected NICU stay for baby, needed parking pass, SW provided that as well as size  bag of diapers per request. SW and parents discussed community resources, she reports that she will be calling [facility] to add baby to insurance and has WIC. Parents reported that they will work around work schedules so no  will be needed.      INTERVENTION    Conducted chart review and consulted with medical team regarding plan of care. Introduced SW role and scope of practice.     Orientation to the unit (parking, lodging, meals, visitation)  Provided assessment of patient and family's level of coping  Conducted psychosocial assessment     Provided SW contact info    PLAN    No further SW intervention identified, please consult with SW team as needed prior to discharge.     Triny Alfred MA, [facility]  Casual Pediatric Social Worker  On call pager: [phone]

## 2023-08-12 NOTE — PROVIDER NOTIFICATION
23 0028   Provider Notification   Provider Name/Title DR Trinh Lr   Method of Notification At Bedside   Notification Reason SVE     Mona reports a great deal of rectal pressure. SVE 7. Anticipate .

## 2023-08-12 NOTE — PROGRESS NOTES
Care taken over. Pt stayed about 30 mins in NICCU with baby, was very happy. On [facility]CC unit, antibiotic treatment continues. Denies pain and pre-eclamptic symptoms. C/O feeling very tired, allowed to rest.

## 2023-08-12 NOTE — PROVIDER NOTIFICATION
08/12/23 1514   Provider Notification   Provider Name/Title Dr Moreira, G3   Method of Notification Electronic Page   Request Evaluate-Remote   Notification Reason Vital Signs Change     FYI:  BP elevated to 140/97 after ambulating.  Recheck 30 min later 133/87.

## 2023-08-12 NOTE — CARE PLAN
Patient VSS and WNL. Patient is resting with CPAP at this time. Support person was in the room. Patient saw Nephrology this evening. Patient reports being fatigued and no energy. Patient wanting to sleep this evening. Patient states family brought chicken soup for her. Patient declined need for assistance to order food. Patient is rsting at this time.

## 2023-08-12 NOTE — PROVIDER NOTIFICATION
08/12/23 1525   Provider Notification   Provider Name/Title Dr Moreira, G3   Method of Notification Electronic Page   Request Evaluate-Remote   Notification Reason Other     Pt starting Myfortic this evening at 1800, does she need a drug level prior?  If so, please order.  Thank you!    Update:  Dr from Nephrology at bedside, states no Myfortic level needed prior to administering this evening.

## 2023-08-12 NOTE — PROVIDER NOTIFICATION
08/12/23 0800   Provider Notification   Provider Name/Title Dr Trinh Lr, G3   Method of Notification Phone   Request Evaluate-Remote   Notification Reason Other     Called Dr Trinh Lr to get clarification on pt Sodium Chloride order (10-125ml/hr continuous until discontinued).  Dr Trinh Lr will place this order on hold.  Also Tylenol and Colace have duplicate orders in MAR, she will discontinue one of them.

## 2023-08-12 NOTE — PLAN OF CARE
Pt became more uncomfortable throughout evening, requested epidural at 1825. BP in severe range and treated with oral nifedipine with good response. Epidural placed with good relief. Monitoring shows moderate to minimal variability with mostly 10x10 accels, occasional late decel. Position changed throughout evening. Monitoring difficult at times due to movement and positions. AROM at 2253, clear fluid with bloody show. BPs currently 130-140s/80s. Urine output adequate from nephrostomy tube.

## 2023-08-12 NOTE — PLAN OF CARE
Goal Outcome Evaluation:      Plan of Care Reviewed With: patient    Overall Patient Progress: improvingOverall Patient Progress: improving       Uneventful recovery after vaginal delivery. Routine recovery cares/assessments performed. Mona is meeting all goals. Denies s/s of preE. Plan to visit baby in NICU when transferring to [facility].

## 2023-08-12 NOTE — PROGRESS NOTES
Nephrology Progress Note  2023       History of Present Illness  Mona Wagner is a 32 year old female past medical history of ESKD of unclear etiology, status post DDKT in 2019, CKD, high risk pregnancy admitted for increased contractions.  On admission noted to have creatinine elevation from baseline of 1.3-1 0.4 to 1.6 and also noted to have hyperkalemia at potassium of 5.5, repeat was 6.1.  There is associated metabolic acidosis as well.  Patient endorsed having pain in her right lower quadrant that is radiating into her groin and contractions at the same time. UA was dirty and culture was positive.      Interval History :   Provider and nursing notes from past 24 hour reviewed.  Patient underwent induction with Pitocin yesterday and delivered a liveborn male infant at 1:46 AM this morning.  Apgar was 7,/9.  Weight 2020 g.  Estimated blood loss 91 mL.  Overall she urinated 3.6 L yesterday.  Actually 3.2 L is from nephrostomy tube and urine is only 442.  However, urine started to be more since from voding since morning. Lab this morning showed potassium 4.9, bicarb 20, creatinine 1.12.    ASSESSMENT/RECOMMENDATIONS  # DDKT: ESKD presumedf rom IgAN but never has a kidney Bx Dx at age 23              - Baseline Creatinine: ~ 1.2-1.4 with pregnancy, Pre-pregnancy was 0.8-1.1               - Proteinuria: Mild (0.5-1.0 grams), stated to trend up since 2023              - Date DSA Last Checked: -Sep/2022      Latest DSA: No, cPRA : 25%              - BK Viremia: No              - Kidney Tx Biopsy: Sep 17, 2019; Result: No diagnostic evidence of acute rejection.   She has several issues impacting her kidney function. She developed ureteral stenosis (PNT's now), a UTI most recently (resolved) but we have also seen progression of her underlying IgA as evidenced by her increasing proteinuria.     Per Dr. Oliver note's:  It can take weeks for the kidneys to recover to the point of not needing a perc neph after  delivery. She should see IR on the 14th as planned. They will do an antegrade study to see if she does indeed still need them. If she does not they will remove them. If, as I suspect, she still does, they will leave them in and tx nephrology can arrange the next follow up when they see her post partum.      # Immunosuppression: Tacrolimus immediate release (goal 4-6) and Azathioprine (dose 150 mg daily)              - Patient is in an immunosuppressed state and will continue to monitor for efficacy and toxicity of immunosuppression medications.              -  She was on azathioprine during pregnancy and this was switched to mycophenolate acid 540 mg twice daily after delivery   - Last tacrolimus level was 5.9; currently on Tac 4 mg twice a day  - Will check Tac level today (added on) and again on Monday morning  # Metabolic acidosis  She is on bicarb 1300 mg 3 times daily.  #Hyperkalemia; resolved; she has patiromer prn at home  # Ureteral Stenosis/Hydronephrosis: Patient with moderate hydronephrosis of kidney transplant and developed CAMERON.  She underwent PNT 6/9/23 with repeat kidney transplant ultrasound 6/11 still showing moderate hydronephrosis suggesting this may be a functional hydronephrosis from pregnancy. However, patient does report resolution of edema and dyspnea, as well as apparent post-obstructive diuresis.  She has a previous h/o ureteral stenosis with ureteral stent removed 2/2021.  Previous kidney transplant ultrasound 7/2021 also showed moderate hydronephrosis unchanged with voiding. Hydronephrosis was unchanged on 7/6/22 abdominal CT. Most recent U/S 7/5/23 with improvement in hydronephrosis with PNT in place. Now only 12% of her urine is coming from her urethra..hopefully this will increase post partum.               - Now making some urine through urethra              - She will continue to have PCN so long as she is draining through it and likely for 4 weeks after delivery   - Neph tube  exchange planned on 8/14, will keep scheduled for now-- See above  # Hypertension  Pressure this morning is 128/88.  Currently on nifedipine ER 60 mg twice daily.  No change in blood pressure medication at this time.  # Post delivery pharmacologic management: She does not plan to breast feed. Prior to her pregnancy she was on myfortic and expect we will transition to that post delivery. If her blood pressure remains elevated to the point of treatment we can use any agents (again because she does not intend to breast feed).     Recommendations were communicated to primary team via in person and note.     Mayito Castillo MD     Review of Systems:   10 systems neg except as mentioned above.     Physical Exam:   I/O last 3 completed shifts:  In: 4925.67 [P.O.:2225; I.V.:2700.67]  Out: 4064 [Urine:3917; Blood:147]  /74  Pulse 78  Resp 16 SpO2 99%  Wt 80.3 kg  GENERAL APPEARANCE: alert and no distress  EYES:  no scleral icterus, pupils equal  PULM: no respiratory distress   CV: RRR      -edema: none  MS: no evidence of inflammation in joints, no muscle tenderness  NEURO: mentation intact and speech normal   ABD; + RLQ perc tube, site clean and ry.     Labs:   All labs reviewed by me  Electrolytes/Renal -   Recent Labs   Lab Test 08/11/23  0745 08/10/23  0934 08/09/23  1357 07/17/23  1024 07/13/23  1015 07/02/23  1704 06/29/23  1300 06/22/23  1007 06/19/23  1028 06/16/23  0723 06/15/23  1009 06/12/23  0640 06/12/23  0235    137 136   < > 135*   < >  --    < > 133*   < > 133*   < > 133*   POTASSIUM 4.9 5.1 4.7   < > 5.0   < >  --    < > 5.2   < > 4.8   < > 5.7*   CHLORIDE 108* 106 105   < > 104   < >  --    < > 103   < > 100   < > 103   CO2 20* 20* 21*   < > 21*   < >  --    < > 19*   < > 20*   < > 17*   BUN 22.6* 27.8* 26.1*   < > 29.8*   < >  --    < > 34.4*   < > 27.6*   < > 26.1*   CR 1.12* 1.35* 1.27*   < > 1.38*   < >  --    < > 1.30*   < > 1.25*   < > 1.30*   GLC 89 91 169*   < > 94   < >  --    < > 89    < > 138*   < > 119*   SHAMIR 9.1 8.9 9.0   < > 9.3   < >  --    < > 9.7   < > 9.6   < > 9.8   MAG  --   --   --   --  2.3  --  2.0  --  1.7   < > 1.3*   < > 1.6*   PHOS  --   --   --   --  3.3  --   --   --   --   --  3.6  --  2.6    < > = values in this interval not displayed.       CBC -   Recent Labs   Lab Test 08/12/23  0849 08/11/23  0745 08/10/23  0934   WBC 14.0* 9.2 8.6   HGB 11.1* 10.8* 10.5*    182 181       LFTs -   Recent Labs   Lab Test 08/11/23  0745 08/10/23  0934 08/09/23  1357   ALKPHOS 134* 124* 124*   BILITOTAL 1.0 1.0 1.1   ALT 35 38 42   AST 33 33 38   PROTTOTAL 6.6 6.3* 6.1*   ALBUMIN 3.2* 3.1* 3.0*       Iron Panel -   Recent Labs   Lab Test 07/31/23  1017 05/22/23  1023 05/22/23  1022 04/25/23  1035   IRON 94  --  80 49   IRONSAT 35  --  41 27   MONET 1,525*   < >  --  1,770*    < > = values in this interval not displayed.         Imaging:  Reviewed      Current Medications:   aspirin  81 mg Oral Daily    cefTAZidime  1 g Intravenous Q8H    cyanocobalamin  1,000 mcg Oral QAM    docusate sodium  100 mg Oral Daily    levothyroxine  50 mcg Oral Daily    magnesium oxide  800 mg Oral QAM    mycophenolic acid  540 mg Oral BID IS    NIFEdipine ER OSMOTIC  60 mg Oral Q12H    prenatal multivitamin w/iron  1 tablet Oral QPM    sodium bicarbonate  1,300 mg Oral TID    tacrolimus  4 mg Oral BID    ursodiol  300 mg Oral TID    vitamin D3  50 mcg Oral QAM      - MEDICATION INSTRUCTIONS -      - MEDICATION INSTRUCTIONS -      oxytocin in 0.9% NaCl      oxytocin in 0.9% NaCl Stopped (08/12/23 0353)    [Held by provider] sodium chloride 120 mL/hr at 08/12/23 0557       Mayito Castillo MD

## 2023-08-12 NOTE — PLAN OF CARE
[id number]:  VSS and postpartum assessments WDL.  Up ad aron with steady gait, requiring some assist with pericares.  Went down to visit infant in NICU today and also watching infant on computer monitor when in room.  Pain managed with tylenol and simethicone per MAR.  Nephrostomy tube collecting adequate amounts of urine, pt also voiding some into urine measuring hat.  , Jt present and supportive.  Reviewed birth certificate and EDS.  Will continue with postpartum cares and education per plan of care.

## 2023-08-12 NOTE — PROGRESS NOTES
Post Partum Progress Note  PPD#1    Subjective:  Mona is doing well this morning, sitting up in bed. Feeling sore in her legs and her perineum, tylenol takes the edge off. She is tolerating PO intake. Lochia present and the same amount as a period. She is voiding through her PNT tube and also through the urethra on multiple occasions. She passed flatus and had a BM. Ambulating well, just a small amount of dizziness. She did start noticing some blurry vision yesterday, wonders if she needs her new glasses. No spots or stars in her vision or headaches. She denies nausea/vomiting, chest pain, shortness of breath, RUQ pain. She has noticed that the left leg became more swollen than the right yesterday. Patient is bottle feeding infant who is stable in the NICU.    Objective:  Vitals:    08/12/23 1830 08/12/23 2200 08/13/23 0227 08/13/23 0609   BP: 135/87 (!) 139/90 (!) 152/91 (!) 135/97   BP Location: Right arm Right arm Right arm Right arm   Patient Position: Semi-Carrasco's Semi-Carrasco's Semi-Carrasco's Semi-Carrasco's   Cuff Size: Adult Regular Adult Regular Adult Regular Adult Regular   Pulse: 76 76     Resp:  18  18   Temp:  98.1  F (36.7  C) 98.1  F (36.7  C) 97.9  F (36.6  C)   TempSrc:  Oral Oral Oral   SpO2:       Weight:    77 kg (169 lb 12.8 oz)   Height:         General: NAD. A&Ox3.  CV: Regular rate and rhythm, well perfused.   Pulm: Normal respiratory effort, CTAB   Abd: Soft, appropriately tender, non-distended. Fundus is firm and 1 cm below the umbilicus. PNT in place  Ext: 1+ lower extremity edema on L, trace edema on R. No calf tenderness.    Results for orders placed or performed during the hospital encounter of 08/03/23 (from the past 24 hour(s))   CBC with platelets   Result Value Ref Range    WBC Count 14.0 (H) 4.0 - 11.0 10e3/uL    RBC Count 3.49 (L) 3.80 - 5.20 10e6/uL    Hemoglobin 11.1 (L) 11.7 - 15.7 g/dL    Hematocrit 34.0 (L) 35.0 - 47.0 %    MCV 97 78 - 100 fL    MCH 31.8 26.5 - 33.0 pg    MCHC  32.6 31.5 - 36.5 g/dL    RDW 14.8 10.0 - 15.0 %    Platelet Count 201 150 - 450 10e3/uL   Comprehensive metabolic panel   Result Value Ref Range    Sodium 139 136 - 145 mmol/L    Potassium 5.3 3.4 - 5.3 mmol/L    Chloride 109 (H) 98 - 107 mmol/L    Carbon Dioxide (CO2) 19 (L) 22 - 29 mmol/L    Anion Gap 11 7 - 15 mmol/L    Urea Nitrogen 22.7 (H) 6.0 - 20.0 mg/dL    Creatinine 1.27 (H) 0.51 - 0.95 mg/dL    Calcium 8.7 8.6 - 10.0 mg/dL    Glucose 82 70 - 99 mg/dL    Alkaline Phosphatase 132 (H) 35 - 104 U/L    AST 38 0 - 45 U/L    ALT 38 0 - 50 U/L    Protein Total 6.6 6.4 - 8.3 g/dL    Albumin 3.2 (L) 3.5 - 5.2 g/dL    Bilirubin Total 1.1 <=1.2 mg/dL    GFR Estimate 58 (L) >60 mL/min/1.73m2   Extra Tube *Canceled*    Narrative    The following orders were created for panel order Extra Tube.  Procedure                               Abnormality         Status                     ---------                               -----------         ------                     Extra Green Top (Lithium...[[id number]]                                                   Please view results for these tests on the individual orders.   Extra Tube    Narrative    The following orders were created for panel order Extra Tube.  Procedure                               Abnormality         Status                     ---------                               -----------         ------                     Extra Purple Top Tube[[id number]]                            Final result                 Please view results for these tests on the individual orders.   Extra Purple Top Tube   Result Value Ref Range    Hold Specimen JI    CBC with platelets   Result Value Ref Range    WBC Count 9.9 4.0 - 11.0 10e3/uL    RBC Count 2.96 (L) 3.80 - 5.20 10e6/uL    Hemoglobin 9.4 (L) 11.7 - 15.7 g/dL    Hematocrit 28.5 (L) 35.0 - 47.0 %    MCV 96 78 - 100 fL    MCH 31.8 26.5 - 33.0 pg    MCHC 33.0 31.5 - 36.5 g/dL    RDW 14.9 10.0 - 15.0 %    Platelet Count 172 150 -  450 10e3/uL   Comprehensive metabolic panel   Result Value Ref Range    Sodium 136 136 - 145 mmol/L    Potassium 4.9 3.4 - 5.3 mmol/L    Chloride 107 98 - 107 mmol/L    Carbon Dioxide (CO2) 21 (L) 22 - 29 mmol/L    Anion Gap 8 7 - 15 mmol/L    Urea Nitrogen 26.6 (H) 6.0 - 20.0 mg/dL    Creatinine 1.17 (H) 0.51 - 0.95 mg/dL    Calcium 9.0 8.6 - 10.0 mg/dL    Glucose 73 70 - 99 mg/dL    Alkaline Phosphatase 104 35 - 104 U/L    AST 29 0 - 45 U/L    ALT 30 0 - 50 U/L    Protein Total 5.9 (L) 6.4 - 8.3 g/dL    Albumin 2.9 (L) 3.5 - 5.2 g/dL    Bilirubin Total 0.8 <=1.2 mg/dL    GFR Estimate 64 >60 mL/min/1.73m2     *Note: Due to a large number of results and/or encounters for the requested time period, some results have not been displayed. A complete set of results can be found in Results Review.           Assessment/Plan:  Mona Wagner is a 33 yo G1 now  PPD#1 s/p  at 34w1d after IOL due to worsening renal function in the setting of IgA nephropathy s/p renal transplant () complicated by allograft hydronephosis s/p PNT placement () and recurrent UTI. Her pregnancy is otherwise complicated by chronic hypertension (normotensive not on medications since her transplant) with superimposed preeclampsia without severe features, secondary renal hyperparathyroidism, hypothyroidism, FATOUMATA, history of an upper extremity DVT at her dialysis catheter site and prior bacterial endocarditis with justin echo this pregnancy.     Routine postpartum cares  - Encourage routine postpartum goals including ambulation and incentive spirometry  - PNC: Rh +. Rubella non-immune. Will confirm with nephrology that they have no objections to MMR vaccine prior to discharge given patient is immunosuppressed.  - Pain: controlled on oral medications, no NSAIDs  - Heme: Hgb 10.8>EBL 91mL> 11.1.   - GI: continue anti-emetics and stool softeners as needed.  - : PNT tube in place, patient also voiding spontaneously  - Infant: Stable in NICU  -  Feeding: Plans on bottle feeding.  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 She has been afebrile throughout admission. No leukocytosis or other clinical evidence of pyelonephritis at present. Possible colonization of PNT.  - Nephrology following, appreciate recommendations  - Day #10 Ceftazidime q8h - plan to continue until discharge, or 10-14 days (which ever occurs first).   - PNT in place since 6/9 (exchange scheduled for 8/14), patient has been intermittently voiding spontaneously since delivery. Cr overall improving, last 1.17  - Tacrolimus level therapeutic (goal 4-6); level from yesterday pending. Will check trough level on Monday 30 minutes before morning dose per Nephrology.  - Per Nephrology, Azathioprine discontinued and Mycophenolic Acid restarted at 540mg BID  - CMP daily      Superimposed pre-eclampsia without severe features  Chronic hypertension  Patient with history of hypertension prior to transplant. Has not required antihypertensives since then and was not started on antihypertensives during this pregnancy until this admission. Though patient did have prior admissions in this pregnancy with severe range BPs in the setting of renal dysfunction/allograft hydronephrosis. Patient meets criteria for superimposed pre-eclampsia without SF.  - Continue to monitor blood pressure closely, reviewed signs/symptoms of preeclampsia  - Goal blood pressure < 130/80 mmHg  - Cr improving but continues to be elevated above baseline at 1.27, other HELLP labs wnl 8/12  - D#4 nifedipine 60 mg BID. Patient above goal > 50%, will discuss possible addition of a second antihypertensive with Nephrology  - Daily HELLP labs    Hyperkalemia-resolved  - EKG 8/5  with no acute changes.  - Potassium wnl today  - Continue to monitor CMP daily     Hypothyroidism  Secondary hyperparathyroidism with hypercalcemia   S/p resection of parathyroid glands on 4/28. Follows with endocrinology.  - Continue levothyroxine 50 mcg QD  - 6/16/23 TSH 2.51     History of DVT  L>R Lower Extremity Edema  History of upper extremity DVT in 2014 associated with dialysis catheter. Assessment for inherited thrombophilias including Factor V Leiden and prothrombin gene mutation were negative. APLS testing in this pregnancy notable only for weakly positive cardiolipin IgM Ab. Now with L>R LE edema concerning for possible DVT.  - LE Doppler US ordered  - Encouraged ambulation  - No indication for anticoagulation at this time     History of bacterial endocarditis  No acute issues. Last echo on 3/20 with LV hyperkinetic and LVEF >70%, RV function normal.    SBE prophylaxis was not indicated      FATOUMATA  - Continue PTA CPAP      Cholestasis of Pregnancy  Bile Acids 47 (8/3/2023). AST and ALT wnl today.   - Discontinued ursodiol now that patient is postpartum    Continue postpartum care and inpatient management     Delicia Moreira MD  OB/GYN Resident, PGY-3

## 2023-08-12 NOTE — PROGRESS NOTES
[facility]  Labor Progress Note    S: Patient is doing well. Feels much better after epidural. Felt off during her high blood pressures, better now.     O:   Patient Vitals for the past 4 hrs:   BP SpO2   23 (!) 141/84 --   23 136/80 99 %   23 -- 99 %   23 131/72 --   23 (!) 140/78 --   23 -- 99 %   23 135/69 --   23 129/69 --   23 124/66 99 %   23 129/67 --   23 -- 99 %   23 125/67 --   23 -- 100 %   23 133/67 --   23 1853 (!) 161/95 --   23 1836 (!) 167/95 --   23 1826 (!) 199/102 --   23 1822 (!) 186/91 --   23 1807 (!) 173/93 --   23 1638 (!) 146/92 --   23 1637 (!) 170/84 --     Gen: alert, NAD, breathing through contractions  SVE: 5-6 cm per RN     FHT: Baseline 150, moderate variability, accelerations present, one possible early decelerations now with epidural  [place]: 3 contractions in 10 minutes    A/P:  Ms. Mona Wagner is a 32 year old  at 34w0d by LMP c/w 8w4d, here for IOL after antepartum stay. History notable for right renal transplant w/ PNT being treated for pyelonephritis and superimposed preE w/o SF. Labor is progressing well.     Labor:   - Augmentation: on pitocin 12 units currently, continue titrating to contractions   - Monitoring: external   - GBS negative  - Pain control: now with epidural   - Labor Preferences: baby gently placed on abdomen under a blanket, dad to cut umbilical cord. If CS needed discussed VML incision, would avoid CS STAT due to concerns about renal injury and difficult airway.   - Plan: Continue on pitocin with uptitrate prn. AROM prn pending cervical change.     SI PreE w/o SF   Elevated BP thought to be in context of pain and worsening cHTN (as evidenced also by worsening Cr). S/p nifed immediate acting x1. BP now improved with epidural. No s/s of PreE w/ SF at  this time. Will continue to monitor closely.     FWB:   - Overall Category I FHT    See MFM note for details of other medical issues    Melani Lr MD MPH  OB/Gyn Resident PGY-3  8/11/2023  7:58 PM

## 2023-08-12 NOTE — PROGRESS NOTES
[facility]  Labor Progress Note    S: Feeling more pressure    O:   Patient Vitals for the past 4 hrs:   BP Temp Temp src Resp SpO2   23 0028 -- 98.3  F (36.8  C) Oral 16 --   23 2342 137/85 98.5  F (36.9  C) Oral 16 99 %   23 2313 (!) 142/85 -- -- -- 98 %   23 2248 -- 98.5  F (36.9  C) Oral 20 --   23 2245 (!) 154/90 -- -- -- --   23 2213 139/85 -- -- -- 99 %   23 2148 -- -- -- -- 99 %   234 (!) 157/100 -- -- -- --   23 2113 (!) 148/89 -- -- -- 99 %     Gen: alert, NAD, breathing through contractions  SVE: /-2    FHT: Baseline 155, moderate variability, accelerations present, occ variable and early decels  [place]: 4-5 contractions in 10 minutes    A/P:  Ms. Mona Wagner is a 32 year old  at 34w1d by LMP c/w 8w4d, here for IOL after antepartum stay. History notable for right renal transplant w/ PNT being treated for pyelonephritis and superimposed preE w/o SF. Labor is progressing well.     Labor:   - Augmentation: on pitocin. Now s/p AROM with blood tinged fluid.   - Monitoring: external   - GBS negative  - Pain control: now with epidural   - Labor Preferences: baby gently placed on abdomen under a blanket, dad to cut umbilical cord. If CS needed discussed VML incision, would avoid CS STAT due to concerns about renal injury and difficult airway.   - Plan: Continue on pitocin with uptitrate prn    SI PreE w/o SF   Elevated BP thought to be in context of pain and worsening cHTN (as evidenced also by worsening Cr). S/p nifed immediate acting x1. BP now improved with epidural. No s/s of PreE w/ SF at this time. Will continue to monitor closely.     FWB:   - Overall Category I FHT with periods of Cat II, good cervical change. Anticipate     See MFM note for details of other medical issues    Melani Lr MD MPH  OB/Gyn Resident PGY-3  2023  1:08 AM

## 2023-08-12 NOTE — PROGRESS NOTES
Post Partum Progress Note  PPD#0    Subjective:  Mona is doing well this morning, sitting up in bed. She shares that she feels much lighter today. Pain is improving and well controlled on current medication regimen. She is tolerating PO intake. Lochia present and the same amount as a period. She is voiding through her PNT tube and also through the urethra on multiple occasions. She has not passed flatus or had a BM. She had a little bit of lightheadedness with ambulation.  She denies headache, changes in vision, nausea/vomiting, chest pain, shortness of breath, RUQ pain, or worsening edema. Patient is bottle feeding infant who is stable in the NICU.    Objective:  Vitals:    08/12/23 0355 08/12/23 0500 08/12/23 0600 08/12/23 1000   BP: 128/70 (!) 137/91 128/88 122/88   BP Location:  Right arm Right arm Right arm   Patient Position:  Supine Supine    Cuff Size:  Adult Regular Adult Regular Adult Regular   Pulse:  93  88   Resp:  18  16   Temp:  98.2  F (36.8  C)  98.8  F (37.1  C)   TempSrc:  Oral  Oral   SpO2: 98%      Weight:       Height:         General: NAD. A&Ox3.  CV: Regular rate and rhythm, well perfused.   Pulm: Normal respiratory effort, ctab   Abd: Soft, appropriately tender, non-distended. Fundus is firm and 1 cm below the umbilicus. PNT in place  Ext: trace lower extremity edema bilaterally. No calf tenderness.  Reflexes: patellar reflex 1+ b/l, no clonus b/l      Latest Reference Range & Units 08/12/23 08:49   WBC 4.0 - 11.0 10e3/uL 14.0 (H)   Hemoglobin 11.7 - 15.7 g/dL 11.1 (L)   Hematocrit 35.0 - 47.0 % 34.0 (L)   Platelet Count 150 - 450 10e3/uL 201      Latest Reference Range & Units 08/12/23 08:49   Sodium 136 - 145 mmol/L 139   Potassium 3.4 - 5.3 mmol/L 5.3   Chloride 98 - 107 mmol/L 109 (H)   Carbon Dioxide (CO2) 22 - 29 mmol/L 19 (L)   Urea Nitrogen 6.0 - 20.0 mg/dL 22.7 (H)   Creatinine 0.51 - 0.95 mg/dL 1.27 (H)   GFR Estimate >60 mL/min/1.73m2 58 (L)   Calcium 8.6 - 10.0 mg/dL 8.7   Anion  Gap 7 - 15 mmol/L 11   Albumin 3.5 - 5.2 g/dL 3.2 (L)   Protein Total 6.4 - 8.3 g/dL 6.6   Alkaline Phosphatase 35 - 104 U/L 132 (H)   ALT 0 - 50 U/L 38   AST 0 - 45 U/L 38      Latest Reference Range & Units 23 10:10   Tacrolimus by Tandem Mass Spectrometry 5.0 - 15.0 ug/L 5.9       Assessment/Plan:  Mona Wagner is a 31 yo G1 now  PPD#0 s/p  at 34w1d after IOL due to worsening renal function in the setting of IgA nephropathy s/p renal transplant () complicated by allograft hydronephosis s/p PNT placement () and recurrent UTI. Her pregnancy is otherwise complicated by chronic hypertension (normotensive not on medications since her transplant) with superimposed preeclampsia without severe features, secondary renal hyperparathyroidism, hypothyroidism, FATOUMATA, history of an upper extremity DVT at her dialysis catheter site and prior bacterial endocarditis with justin echo this pregnancy.     Routine postpartum cares  - Encourage routine postpartum goals including ambulation and incentive spirometry  - PNC: Rh +. Rubella non-immune. Will confirm with nephrology that they have no objections to MMR vaccine prior to discharge given patient is immunosuppressed.  - Pain: controlled on oral medications, no NSAIDs  - Heme: Hgb 10.8>EBL 91mL>AM Hgb 11.1.   - GI: continue anti-emetics and stool softeners as needed.  - : PNT tube in place, patient also voiding spontaneously  - Infant: Stable in NICU  - Feeding: Plans on bottle feeding.  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 She has been afebrile throughout admission. No leukocytosis or other clinical evidence of pyelonephritis at present. Possible colonization of PNT.  - Nephrology following,  appreciate recommendations  - Day #9 Ceftazidime q8h - plan to continue until discharge, or 10-14 days (which ever occurs first).   - PNT in place since 6/9 (exchange scheduled for 8/14), patient has been intermittently voiding spontaneously since delivery. Cr improving now 1.27 this AM  - Tacrolimus level therapeutic (goal 4-6); will check trough level tomorrow 30 minutes before morning dose per Nephrology.  - Per Nephrology Azathioprine discontinued and Mycophenolic Acid restarted at 540mg BID  - CMP daily      Superimposed pre-eclampsia without severe features  Chronic hypertension  Patient with history of hypertension prior to transplant. Has not required antihypertensives since then and was not started on antihypertensives during this pregnancy until this admission. Though patient did have prior admissions in this pregnancy with severe range BPs in the setting of renal dysfunction/allograft hydronephrosis. Patient meets criteria for superimposed pre-eclampsia without SF.  - Continue to monitor blood pressure closely, reviewed signs/symptoms of preeclampsia  - Goal blood pressure < 130/80 mmHg  - Cr improving but continues to be elevated above baseline at 1.27, other HELLP labs wnl 8/12  - D#3 nifedipine 60 mg BID  - Daily HELLP labs    Hyperkalemia-resolved  - EKG 8/5 with no acute changes.  - Potassium wnl today  - Continue to monitor CMP daily     Hypothyroidism  Secondary hyperparathyroidism with hypercalcemia   S/p resection of parathyroid glands on 4/28. Follows with endocrinology.  - Continue levothyroxine 50 mcg QD  - 6/16/23 TSH 2.51     History of DVT  History of upper extremity DVT in 2014 associated with dialysis catheter. Assessment for inherited thrombophilias including Factor V Leiden and prothrombin gene mutation were negative. APLS testing in this pregnancy notable only for weakly positive cardiolipin IgM Ab.   - Encouraged ambulation, SCDs  - No indication for anticoagulation at this time      History of bacterial endocarditis  No acute issues. Last echo on 3/20 with LV hyperkinetic and LVEF >70%, RV function normal.    SBE prophylaxis was not indicated      FATOUMATA  - Continue PTA CPAP      Cholestasis of Pregnancy  Bile Acids 47 (8/3/2023). AST and ALT wnl today.   - Discontinue ursodiol now that patient is postpartum    Continue postpartum care and inpatient management     Jenifer Toledo, MS4    M Attending Attestation  I saw this patient with the medical student and agree with their findings and plan of care as documented. I personally reviewed her vital signs, medications, labs, pertinent imaging and edited the note above.    Time Spent on this Encounter   I spent a total of 40 minutes face-to-face or coordinating care of Ms. Wagner. Over 50% of my time on the unit was spent counseling the patient and /or coordinating care regarding diagnosis, diagnostic results, prognosis and risks and benefits of treatment options.     Date of service (when I saw the patient): 8/12/2023     Corinna Dunn MD MAS  , OB/GYN  Maternal-Fetal Medicine  [phone] (Pager)

## 2023-08-12 NOTE — PROVIDER NOTIFICATION
08/11/23 1822   Provider Notification   Provider Name/Title Dr. Lr   Method of Notification Electronic Page   Request Evaluate - Remote   Notification Reason Maternal Vital Sign Change     Pt had 2 severe BPs, need order for BP med. SVE 5.5, getting an epidural. Labetolol causes nipple pain and discomfort.    Response: Give po nifedipine.

## 2023-08-12 NOTE — L&D DELIVERY NOTE
DELIVERY SUMMARY    Delivery Note:  Mona Wagner is a 32 year old  at 34w1d who was admitted to the antepartum service for 10 days for concern for pyelonephritis and CAMERON in the setting of a hx of a renal transplant secondary to IgA nephropathy. Her pregnancy was notable for PNT placement, SI preE w/o SF, bicornuate uterus, ICP, Hypothyroidism, Hx DVT, Hx bacterial endocarditis. In the setting of her worsening CAMERON and SI PreE w/o SF, recommendation was made to proceed with an induction of labor. She was started on pitocin and AROMed. She receive an epidural for pain. At 0017 she was noted to be complete and began to push. At 0146 on , she she had an  of a liveborn male infant on 2023. The infant head was delivered in direct OA position. There were no nuchal cords. Apgars were 7 and 9 and 1 and 5 minutes. Weight 2020g. The cord was allowed to pulse for 1 minute and was then clamped and cut. The infant was then placed on the patient's abdomen for immediate skin-to-skin. The baby was then taken over to the warmer to be evaluated by NICU. Routine cord blood was obtained. IV pitocin was started for active management of the third stage. The placenta was delivered with gentle downward traction. It was found to be intact with a three vessel cord. She sustained a second degree perineal laceration that was repaired in the usual fashion with a 3-0 Vicryl. Upon final inspection, there was good hemostasis and uterine tone was firm. Instrument and sharp count was correct. QBL: 91 mL    Dr. Johnson was present for delivery    Melani Lr MD, MPH  Obstetrics and Gyncology, PGY-3  2023 , 2:48 AM      I was present during the delivery and supervised the resident perform the patient's  and perineal repair.    Tammie Johnson MD        Mona Wagner MRN# [id number]   Age: 32 year old YOB: 1990     Yin Assessment Tool Data    Gestational Age:  Gestational Age: 34w1d     Maternal  temperature range:  Temp  Av.2  F (36.8  C)  Min: 97.7  F (36.5  C)  Max: 98.5  F (36.9  C)    Membranes ruptured for:   (Delivered) Hours: 2 Minutes: 53     GBS status:  No results found for: GBS    Antibiotic Status:  Antibiotics       IV Antibiotic Given       Additional Management     Fetal Status Prior to  Delivery Category 2    Fetal Status Comments        Sepsis Prebirth Score:      Sepsis Postbirth Score:      Determination based on clinical exam after birth:      Disposition:          Clarice Wagner [[id number]]      Labor Event Times      Active labor onset date: 23 Onset time: 12:34 AM CDT   Dilation complete date: 23 Complete time:  1:17 AM   Start pushing date/time: 2023 0121          Labor Length      1st Stage (hrs): 0 (min): 43   2nd Stage (hrs): 0 (min): 29   3rd Stage (hrs): 0 (min): 21          Labor Events     labor?: Yes   steroids: Full Course  Labor Type: Induction/Cervical ripening, AROM  Predominate monitoring during 1st stage: continuous electronic fetal monitoring     Antibiotics received during labor?: Yes       Rupture date/time: 23 2253   Fluid color: Bloody, Pink     Induction: Oxytocin, AROM  Induction date/time: 23 0830    Cervical ripening date/time:      Indications for induction: Mild Preeclampsia, Other Pregnant Patient Indications (Comment to specify)     Augmentation: Oxytocin, AROM  Indications for augmentation: Hypertension       Delivery/Placenta Date and Time      Delivery Date: 23 Delivery Time:  1:46 AM   Placenta Date/Time: 2023  2:07 AM  Oxytocin given at the time of delivery: after delivery of baby  Delivering clinician: Tammie Johnson MD   Other personnel present at delivery:  Provider Role   Jesica Lr MD Thomas, Elizabeth A, RN Schonenberg, Ryan A RN              Vaginal Counts       Initial count performed by 2 team members:  Two Team Members   Trinh Johnson          Needles Suture Needles Sponges (RETIRED) Instruments   Initial counts 2  5    Added to count  1     Relief counts       Final counts               Placed during labor Accounted for at the end of labor   FSE NA    IUPC NA    Cervidil NA                              Apgars    Living status: Living   1 Minute 5 Minute 10 Minute 15 Minute 20 Minute   Skin color: 0  1       Heart rate: 2  2       Reflex irritability: 2  2       Muscle tone: 2  2       Respiratory effort: 1  2       Total: 7  9       Apgars assigned by: DARRIN SALAS PA-C       Cord      Vessels: 3 Vessels    Cord Complications: None               Cord Blood Disposition: Lab    Gases Sent?: Yes    Delayed cord clamping?: Yes    Cord Clamping Delay (seconds):  seconds            Resuscitation    Methods: Oxygen, NCPAP   Care at Delivery: NICU team was asked to attend delivery by Dr. Dunn due to late  gestation. Infant was born with flexed tone and vigorous cry. He was brought to the warmer after 1 minute of delayed cord clamping where he was suctioned and pulse oximeter was attached. Due oxygen saturations, CPAP was applied and oxygen was titrated up to 40%. Infant quickly weaned down to 21%. He was attempted off of CPAP but had moderate subcostal retractions, so CPAP was continued. He was shown to parents and transferred to NICU.     Darrin Salas PA-C 2023 2:33 AM   [facility]         Skin to Skin and Feeding Plan      Skin to skin initiation date/time: 1841    Skin to skin with: Mother  Skin to skin end date/time: 1841           Labor Events and Shoulder Dystocia    Fetal Tracing Prior to Delivery: Category 2  Shoulder dystocia present?: Neg       Delivery (Maternal) (Provider to Complete) ([id number])    Episiotomy: None  Perineal lacerations: 2nd    Repair suture: 3-0 Vicryl       Blood Loss  Mother: Mona Wagner #[id number]     Start of Mother's Information      Delivery  Blood Loss  08/12/23 0034 - 08/12/23 0248      Delivery QBL (mL) Hospital Encounter 91 mL    Total  91 mL               End of Mother's Information  Mother: Mona Wagner #[id number]                Delivery - Provider to Complete ([id number])    Delivering clinician: Tammie Jonhson MD  Delivery Type (Choose the 1 that will go to the Birth History): Vaginal, Spontaneous                         Other personnel:  Provider Role   Jesica Lr MD Thomas, Elizabeth A, RN Schonenberg, Ryan A, RN                     Placenta    Date/Time: 8/12/2023  2:07 AM  Removal: Expressed  Disposition: Pathology             Anesthesia    Method: Epidural  Cervical dilation at placement: 0-3                    Presentation and Position    Presentation: Vertex    Position: Middle Occiput Anterior                     Melani Lr MD

## 2023-08-12 NOTE — PROGRESS NOTES
[facility]  Labor Progress Note    S: Patient is doing well. Getting some sleep.     O:   Patient Vitals for the past 4 hrs:   BP SpO2   23 139/85 99 %   23 -- 99 %   23 (!) 157/100 --   23 (!) 148/89 99 %   23 -- 99 %   23 -- 100 %   23 137/86 --   23 -- 100 %   23 -- 99 %   23 -- 99 %   23 -- 100 %   23 -- 99 %   23 -- 99 %   23 -- 99 %   23 (!) 140/87 --   23 -- 99 %   23 (!) 140/85 --   23 138/84 99 %   23 (!) 142/82 99 %   23 -- 98 %   23 (!) 150/82 --   23 130/73 99 %   23 -- 99 %   23 136/86 --   23 -- 99 %   23 133/81 --   23 -- 99 %   23 133/79 --   23 -- 99 %   23 (!) 141/84 --   23 136/80 99 %   23 -- 99 %   23 131/72 --   23 (!) 140/78 --   23 -- 99 %   23 135/69 --   23 129/69 --   23 124/66 99 %   23 129/67 --   23 -- 99 %   23 125/67 --   23 1857 -- 100 %     Gen: alert, NAD, breathing through contractions  SVE: 1.5/-2. Recheck by Dr. Johnson. After stripping membranes and AROM /-2    FHT: Baseline 140, moderate variability, accelerations present, no decels   [place]: 3 contractions in 10 minutes    A/P:  Ms. Mona Wagner is a 32 year old  at 34w0d by LMP c/w 8w4d, here for IOL after antepartum stay. History notable for right renal transplant w/ PNT being treated for pyelonephritis and superimposed preE w/o SF. Labor is progressing well.     Labor:   - Augmentation: on pitocin 14 units currently, continue titrating to contractions. Now s/p AROM with blood tinged fluid.   - Monitoring: external   - GBS negative  - Pain control: now  with epidural   - Labor Preferences: baby gently placed on abdomen under a blanket, dad to cut umbilical cord. If CS needed discussed VML incision, would avoid CS STAT due to concerns about renal injury and difficult airway.   - Plan: Continue on pitocin with uptitrate prn    SI PreE w/o SF   Elevated BP thought to be in context of pain and worsening cHTN (as evidenced also by worsening Cr). S/p nifed immediate acting x1. BP now improved with epidural. No s/s of PreE w/ SF at this time. Will continue to monitor closely.     FWB:   - Overall Category I FHT    See MFM note for details of other medical issues    Melani Lr MD MPH  OB/Gyn Resident PGY-3  8/11/2023  10:55 PM

## 2023-08-13 ENCOUNTER — APPOINTMENT (OUTPATIENT)
Dept: ULTRASOUND IMAGING | Facility: CLINIC | Age: 33
End: 2023-08-13
Payer: MEDICARE

## 2023-08-13 LAB
ALBUMIN SERPL BCG-MCNC: 2.9 G/DL (ref 3.5–5.2)
ALP SERPL-CCNC: 104 U/L (ref 35–104)
ALT SERPL W P-5'-P-CCNC: 30 U/L (ref 0–50)
ANION GAP SERPL CALCULATED.3IONS-SCNC: 8 MMOL/L (ref 7–15)
AST SERPL W P-5'-P-CCNC: 29 U/L (ref 0–45)
BILIRUB SERPL-MCNC: 0.8 MG/DL
BUN SERPL-MCNC: 26.6 MG/DL (ref 6–20)
CALCIUM SERPL-MCNC: 9 MG/DL (ref 8.6–10)
CHLORIDE SERPL-SCNC: 107 MMOL/L (ref 98–107)
CREAT SERPL-MCNC: 1.17 MG/DL (ref 0.51–0.95)
DEPRECATED HCO3 PLAS-SCNC: 21 MMOL/L (ref 22–29)
ERYTHROCYTE [DISTWIDTH] IN BLOOD BY AUTOMATED COUNT: 14.9 % (ref 10–15)
GFR SERPL CREATININE-BSD FRML MDRD: 64 ML/MIN/1.73M2
GLUCOSE SERPL-MCNC: 73 MG/DL (ref 70–99)
HCT VFR BLD AUTO: 28.5 % (ref 35–47)
HGB BLD-MCNC: 9.4 G/DL (ref 11.7–15.7)
MCH RBC QN AUTO: 31.8 PG (ref 26.5–33)
MCHC RBC AUTO-ENTMCNC: 33 G/DL (ref 31.5–36.5)
MCV RBC AUTO: 96 FL (ref 78–100)
PLATELET # BLD AUTO: 172 10E3/UL (ref 150–450)
POTASSIUM SERPL-SCNC: 4.9 MMOL/L (ref 3.4–5.3)
PROT SERPL-MCNC: 5.9 G/DL (ref 6.4–8.3)
RBC # BLD AUTO: 2.96 10E6/UL (ref 3.8–5.2)
SODIUM SERPL-SCNC: 136 MMOL/L (ref 136–145)
TACROLIMUS BLD-MCNC: 6.4 UG/L (ref 5–15)
TME LAST DOSE: NORMAL H
TME LAST DOSE: NORMAL H
WBC # BLD AUTO: 9.9 10E3/UL (ref 4–11)

## 2023-08-13 PROCEDURE — 93970 EXTREMITY STUDY: CPT | Mod: 26 | Performed by: RADIOLOGY

## 2023-08-13 PROCEDURE — 36415 COLL VENOUS BLD VENIPUNCTURE: CPT

## 2023-08-13 PROCEDURE — 85027 COMPLETE CBC AUTOMATED: CPT

## 2023-08-13 PROCEDURE — 120N000002 HC R&B MED SURG/OB UMMC

## 2023-08-13 PROCEDURE — 93970 EXTREMITY STUDY: CPT

## 2023-08-13 PROCEDURE — 99233 SBSQ HOSP IP/OBS HIGH 50: CPT | Mod: GC | Performed by: OBSTETRICS & GYNECOLOGY

## 2023-08-13 PROCEDURE — 250N000013 HC RX MED GY IP 250 OP 250 PS 637

## 2023-08-13 PROCEDURE — 250N000012 HC RX MED GY IP 250 OP 636 PS 637

## 2023-08-13 PROCEDURE — 99233 SBSQ HOSP IP/OBS HIGH 50: CPT | Performed by: INTERNAL MEDICINE

## 2023-08-13 PROCEDURE — 250N000013 HC RX MED GY IP 250 OP 250 PS 637: Performed by: OBSTETRICS & GYNECOLOGY

## 2023-08-13 PROCEDURE — 80053 COMPREHEN METABOLIC PANEL: CPT

## 2023-08-13 PROCEDURE — 250N000011 HC RX IP 250 OP 636: Mod: JZ

## 2023-08-13 RX ORDER — LEVOTHYROXINE SODIUM 25 UG/1
25 TABLET ORAL DAILY
Status: DISCONTINUED | OUTPATIENT
Start: 2023-08-13 | End: 2023-08-15 | Stop reason: HOSPADM

## 2023-08-13 RX ADMIN — PRENATAL VITAMINS-IRON FUMARATE 27 MG IRON-FOLIC ACID 0.8 MG TABLET 1 TABLET: at 22:07

## 2023-08-13 RX ADMIN — MYCOPHENOLIC ACID 540 MG: 180 TABLET, DELAYED RELEASE ORAL at 22:08

## 2023-08-13 RX ADMIN — TACROLIMUS 4 MG: 1 CAPSULE ORAL at 22:08

## 2023-08-13 RX ADMIN — SODIUM BICARBONATE 650 MG TABLET 1300 MG: at 14:29

## 2023-08-13 RX ADMIN — ACETAMINOPHEN 650 MG: 325 TABLET, FILM COATED ORAL at 19:55

## 2023-08-13 RX ADMIN — MAGNESIUM OXIDE TAB 400 MG (241.3 MG ELEMENTAL MG) 800 MG: 400 (241.3 MG) TAB at 10:28

## 2023-08-13 RX ADMIN — CEFTAZIDIME 1 G: 1 INJECTION, POWDER, FOR SOLUTION INTRAMUSCULAR; INTRAVENOUS at 22:07

## 2023-08-13 RX ADMIN — SIMETHICONE 125 MG: 125 TABLET, CHEWABLE ORAL at 22:11

## 2023-08-13 RX ADMIN — MYCOPHENOLIC ACID 540 MG: 180 TABLET, DELAYED RELEASE ORAL at 10:27

## 2023-08-13 RX ADMIN — CYANOCOBALAMIN TAB 1000 MCG 1000 MCG: 1000 TAB at 08:25

## 2023-08-13 RX ADMIN — CEFTAZIDIME 1 G: 1 INJECTION, POWDER, FOR SOLUTION INTRAMUSCULAR; INTRAVENOUS at 06:25

## 2023-08-13 RX ADMIN — TACROLIMUS 4 MG: 1 CAPSULE ORAL at 10:27

## 2023-08-13 RX ADMIN — DOCUSATE SODIUM 100 MG: 100 CAPSULE, LIQUID FILLED ORAL at 08:23

## 2023-08-13 RX ADMIN — LEVOTHYROXINE SODIUM 25 MCG: 25 TABLET ORAL at 11:06

## 2023-08-13 RX ADMIN — Medication 50 MCG: at 08:24

## 2023-08-13 RX ADMIN — NIFEDIPINE 60 MG: 30 TABLET, FILM COATED, EXTENDED RELEASE ORAL at 18:09

## 2023-08-13 RX ADMIN — ACETAMINOPHEN 650 MG: 325 TABLET, FILM COATED ORAL at 02:32

## 2023-08-13 RX ADMIN — SODIUM BICARBONATE 650 MG TABLET 1300 MG: at 22:07

## 2023-08-13 RX ADMIN — FAMOTIDINE 20 MG: 20 TABLET ORAL at 08:24

## 2023-08-13 RX ADMIN — SODIUM BICARBONATE 650 MG TABLET 1300 MG: at 08:24

## 2023-08-13 RX ADMIN — NIFEDIPINE 60 MG: 30 TABLET, FILM COATED, EXTENDED RELEASE ORAL at 06:11

## 2023-08-13 RX ADMIN — CEFTAZIDIME 1 G: 1 INJECTION, POWDER, FOR SOLUTION INTRAMUSCULAR; INTRAVENOUS at 14:29

## 2023-08-13 RX ADMIN — POLYETHYLENE GLYCOL 3350 17 G: 17 POWDER, FOR SOLUTION ORAL at 00:26

## 2023-08-13 RX ADMIN — ACETAMINOPHEN 650 MG: 325 TABLET, FILM COATED ORAL at 06:26

## 2023-08-13 ASSESSMENT — ACTIVITIES OF DAILY LIVING (ADL)
ADLS_ACUITY_SCORE: 20

## 2023-08-13 NOTE — PROVIDER NOTIFICATION
08/12/23 2037   Provider Notification   Provider Name/Title Delicia Moreira, G3   Method of Notification Electronic Page   Request Evaluate-Remote   Notification Reason Other     Patient scored a 10 on [place] Depression Screen. No thoughts of harming self.  Social Service consult placed.

## 2023-08-13 NOTE — PROVIDER NOTIFICATION
08/13/23 0251   Provider Notification   Provider Name/Title Delicia Moreira G3   Method of Notification Electronic Page   Request Evaluate-Remote   Notification Reason Vital Signs Change     Patient BP at 0200 152/91. Would you like anything different at this time. Plan to recheck BP at 0600.

## 2023-08-13 NOTE — PROVIDER NOTIFICATION
08/12/23 2230   Provider Notification   Provider Name/Title Delicia Moreira G3   Method of Notification Electronic Page   Request Evaluate-Remote   Notification Reason Vital Signs Change;Medication Request     Patient most recent blood pressure 139/90.  Also patient would like to take Miralax tonight. Could you please order if ok.  Thank you

## 2023-08-13 NOTE — PROGRESS NOTES
Nephrology Progress Note  08/13/2023       History of Present Illness  Mona Wagner is a 32 year old female past medical history of ESKD of unclear etiology, status post DDKT in 2019, CKD, high risk pregnancy admitted for increased contractions.  On admission noted to have creatinine elevation from baseline of 1.3-1 0.4 to 1.6 and also noted to have hyperkalemia at potassium of 5.5, repeat was 6.1.  There is associated metabolic acidosis as well.  Patient endorsed having pain in her right lower quadrant that is radiating into her groin and contractions at the same time. UA was dirty and culture was positive.      Interval History :   Provider and nursing notes from past 24 hour reviewed.  BP this morning showed high diastolic above 90s and one reading with systolic of 152. HR in the 70s. She feels ok except noticing LE edema. Plan for US this afternoon. She denies any problem with chest pain, headache or vision changes. Cr stable at 1.12. Tac 6.4 yest. UOP 3.8 L thus far since midnight but only 450 ml from voiding.     ASSESSMENT/RECOMMENDATIONS  # DDKT: ESKD presumedf rom IgAN but never has a kidney Bx Dx at age 23              - Baseline Creatinine: ~ 1.2-1.4 with pregnancy, Pre-pregnancy was 0.8-1.1               - Proteinuria: Mild (0.5-1.0 grams), stated to trend up since July 2023              - Date DSA Last Checked: -Sep/2022      Latest DSA: No, cPRA : 25%              - BK Viremia: No              - Kidney Tx Biopsy: Sep 17, 2019; Result: No diagnostic evidence of acute rejection.   She has several issues impacting her kidney function. She developed ureteral stenosis (PNT's now), a UTI most recently (resolved) but we have also seen progression of her underlying IgA as evidenced by her increasing proteinuria.     Per Dr. Oliver note's:  It can take weeks for the kidneys to recover to the point of not needing a perc neph after delivery. She should see IR on the 14th as planned. They will do an antegrade study  to see if she does indeed still need them. If she does not they will remove them. If, as I suspect, she still does, they will leave them in and tx nephrology can arrange the next follow up when they see her post partum.     # Transplant kidney pyelonephritis  On IV cefazolin per ID.      # Immunosuppression: Tacrolimus immediate release (goal 4-6) and Azathioprine (dose 150 mg daily)              - Patient is in an immunosuppressed state and will continue to monitor for efficacy and toxicity of immunosuppression medications.              -  She was on azathioprine during pregnancy and this was switched to mycophenolate acid 540 mg twice daily after delivery   - Last tacrolimus level was 5.9; currently on Tac 4 mg twice a day  - Will check Tac level today (added on) and again on Monday morning  # Metabolic acidosis  She is on bicarb 1300 mg 3 times daily.  #Hyperkalemia; resolved; she has patiromer prn at home  # Ureteral Stenosis/Hydronephrosis: Patient with moderate hydronephrosis of kidney transplant and developed CAMERON.  She underwent PNT 6/9/23 with repeat kidney transplant ultrasound 6/11 still showing moderate hydronephrosis suggesting this may be a functional hydronephrosis from pregnancy. However, patient does report resolution of edema and dyspnea, as well as apparent post-obstructive diuresis.  She has a previous h/o ureteral stenosis with ureteral stent removed 2/2021.  Previous kidney transplant ultrasound 7/2021 also showed moderate hydronephrosis unchanged with voiding. Hydronephrosis was unchanged on 7/6/22 abdominal CT. Most recent U/S 7/5/23 with improvement in hydronephrosis with PNT in place. Now only 10-15% of her urine is coming from her urethra.. so likely she still needs PNT. Proceed with planned PNT change tomorrow.                - Now making some urine through urethra but still only 10-15%              - She will continue to have PCN so long as she is draining through it and likely for 4  weeks after delivery   - Neph tube exchange planned on 8/14, will keep scheduled for now-- See above  # Hypertension  Pressure fluctuates with sometimes systolic in the 150s and diastolic in the 90s. Currently on nifedipine ER 60 mg twice daily.  Has reaction with lisinopril in the past and labetalol (dol nipple). Can add metoprolol 12.5 mg BID if BP persistently above 140/90 mmHg.  # Left leg edema  New onset. Pending US.   # Post delivery pharmacologic management: She does not plan to breast feed. Prior to her pregnancy she was on myfortic and expect we will transition to that post delivery. If her blood pressure remains elevated to the point of treatment we can use any agents (again because she does not intend to breast feed).     Recommendations were communicated to primary team via in person and note.     Mayito Castillo MD     Review of Systems:   10 systems neg except as mentioned above.     Physical Exam:   I/O last 3 completed shifts:  In: 1610 [P.O.:1610]  Out: 2800 [Urine:2800]  /74  Pulse 78  Resp 16 SpO2 99%  Wt 80.3 kg  GENERAL APPEARANCE: alert and no distress  EYES:  no scleral icterus, pupils equal  PULM: no respiratory distress   CV: RRR      -edema: Lt leg 1+ pitting edema  MS: no evidence of inflammation in joints, no muscle tenderness  NEURO: mentation intact and speech normal   ABD; + RLQ perc tube, site clean and ry.     Labs:   All labs reviewed by me  Electrolytes/Renal -   Recent Labs   Lab Test 08/13/23  0633 08/12/23  0849 08/11/23  0745 07/17/23  1024 07/13/23  1015 07/02/23  1704 06/29/23  1300 06/22/23  1007 06/19/23  1028 06/16/23  0723 06/15/23  1009 06/12/23  0640 06/12/23  0235    139 138   < > 135*   < >  --    < > 133*   < > 133*   < > 133*   POTASSIUM 4.9 5.3 4.9   < > 5.0   < >  --    < > 5.2   < > 4.8   < > 5.7*   CHLORIDE 107 109* 108*   < > 104   < >  --    < > 103   < > 100   < > 103   CO2 21* 19* 20*   < > 21*   < >  --    < > 19*   < > 20*   < > 17*   BUN  26.6* 22.7* 22.6*   < > 29.8*   < >  --    < > 34.4*   < > 27.6*   < > 26.1*   CR 1.17* 1.27* 1.12*   < > 1.38*   < >  --    < > 1.30*   < > 1.25*   < > 1.30*   GLC 73 82 89   < > 94   < >  --    < > 89   < > 138*   < > 119*   SHAMIR 9.0 8.7 9.1   < > 9.3   < >  --    < > 9.7   < > 9.6   < > 9.8   MAG  --   --   --   --  2.3  --  2.0  --  1.7   < > 1.3*   < > 1.6*   PHOS  --   --   --   --  3.3  --   --   --   --   --  3.6  --  2.6    < > = values in this interval not displayed.       CBC -   Recent Labs   Lab Test 08/13/23  0633 08/12/23  0849 08/11/23  0745   WBC 9.9 14.0* 9.2   HGB 9.4* 11.1* 10.8*    201 182       LFTs -   Recent Labs   Lab Test 08/13/23  0633 08/12/23  0849 08/11/23  0745   ALKPHOS 104 132* 134*   BILITOTAL 0.8 1.1 1.0   ALT 30 38 35   AST 29 38 33   PROTTOTAL 5.9* 6.6 6.6   ALBUMIN 2.9* 3.2* 3.2*       Iron Panel -   Recent Labs   Lab Test 07/31/23  1017 05/22/23  1023 05/22/23  1022 04/25/23  1035   IRON 94  --  80 49   IRONSAT 35  --  41 27   MONET 1,525*   < >  --  1,770*    < > = values in this interval not displayed.         Imaging:  Reviewed      Current Medications:   cefTAZidime  1 g Intravenous Q8H    cyanocobalamin  1,000 mcg Oral QAM    docusate sodium  100 mg Oral Daily    levothyroxine  50 mcg Oral Daily    magnesium oxide  800 mg Oral QAM    mycophenolic acid  540 mg Oral BID IS    NIFEdipine ER OSMOTIC  60 mg Oral Q12H    prenatal multivitamin w/iron  1 tablet Oral QPM    sodium bicarbonate  1,300 mg Oral TID    tacrolimus  4 mg Oral BID    vitamin D3  50 mcg Oral QAM      - MEDICATION INSTRUCTIONS -      - MEDICATION INSTRUCTIONS -      oxytocin in 0.9% NaCl      oxytocin in 0.9% NaCl Stopped (08/12/23 0353)       Mayito Castillo MD

## 2023-08-13 NOTE — PROVIDER NOTIFICATION
08/13/23 0252   Provider Notification   Provider Name/Title Delicia Moreira   Method of Notification Phone     Plan to continue monitoring as ordered at this time. Will talk with nephrology in am for possible changes in meds.

## 2023-08-13 NOTE — PLAN OF CARE
Resumed care at 1500. VSS and postpartum assessments WDL.  Up ad aron with steady gait and independent with cares.  Infant in NICU, video monitor in place in patient room. Pain managed with tylenol, has declined for this RN.  Due to previous leg swelling and hx of upper extremity DVT, US completed result pending at this time. Patient denies shortness of breath, chest pain, extremities warm and well perfused.  Will continue with postpartum cares and education per plan of care.

## 2023-08-13 NOTE — PLAN OF CARE
Afebrile. Blood pressures mildly elevated overnight. Denies headache, visual changes or epigastric pain. Postpartum assessments WDL.Patient is ambulating without difficulty. She showered last evening. Nephrostomy tube draining adequate amounts of urine, patient is also voiding some in urine hat. Fundus firm with scant lochia. Patient states cramping and perineal pain managed with tylenol and simethicone.  Jt present and supportive. Working on birth certificate last evening. Continue to provide support and education as needed. Continue present cares.

## 2023-08-14 ENCOUNTER — HOSPITAL ENCOUNTER (OUTPATIENT)
Dept: INTERVENTIONAL RADIOLOGY/VASCULAR | Facility: CLINIC | Age: 33
Discharge: HOME | End: 2023-08-14
Attending: NURSE PRACTITIONER | Admitting: PHYSICIAN ASSISTANT
Payer: MEDICARE

## 2023-08-14 DIAGNOSIS — Z94.0 KIDNEY REPLACED BY TRANSPLANT: ICD-10-CM

## 2023-08-14 DIAGNOSIS — O09.92 HIGH-RISK PREGNANCY IN SECOND TRIMESTER: ICD-10-CM

## 2023-08-14 DIAGNOSIS — N13.1 HYDRONEPHROSIS WITH URETERAL STRICTURE, NOT ELSEWHERE CLASSIFIED: ICD-10-CM

## 2023-08-14 LAB
ABO/RH(D): NORMAL
ALBUMIN SERPL BCG-MCNC: 3.2 G/DL (ref 3.5–5.2)
ALP SERPL-CCNC: 112 U/L (ref 35–104)
ALT SERPL W P-5'-P-CCNC: 36 U/L (ref 0–50)
ANION GAP SERPL CALCULATED.3IONS-SCNC: 11 MMOL/L (ref 7–15)
ANTIBODY SCREEN: NEGATIVE
APTT PPP: 30 SECONDS (ref 22–38)
AST SERPL W P-5'-P-CCNC: 36 U/L (ref 0–45)
BILIRUB SERPL-MCNC: 0.7 MG/DL
BUN SERPL-MCNC: 28.2 MG/DL (ref 6–20)
CALCIUM SERPL-MCNC: 9.2 MG/DL (ref 8.6–10)
CHLORIDE SERPL-SCNC: 105 MMOL/L (ref 98–107)
CREAT SERPL-MCNC: 1.15 MG/DL (ref 0.51–0.95)
DEPRECATED HCO3 PLAS-SCNC: 24 MMOL/L (ref 22–29)
ERYTHROCYTE [DISTWIDTH] IN BLOOD BY AUTOMATED COUNT: 15.1 % (ref 10–15)
FIBRINOGEN PPP-MCNC: 624 MG/DL (ref 170–490)
GFR SERPL CREATININE-BSD FRML MDRD: 65 ML/MIN/1.73M2
GLUCOSE SERPL-MCNC: 119 MG/DL (ref 70–99)
HCT VFR BLD AUTO: 29 % (ref 35–47)
HGB BLD-MCNC: 9.7 G/DL (ref 11.7–15.7)
INR PPP: 0.91 (ref 0.85–1.15)
MCH RBC QN AUTO: 31.6 PG (ref 26.5–33)
MCHC RBC AUTO-ENTMCNC: 33.4 G/DL (ref 31.5–36.5)
MCV RBC AUTO: 95 FL (ref 78–100)
PLATELET # BLD AUTO: 212 10E3/UL (ref 150–450)
POTASSIUM SERPL-SCNC: 4.5 MMOL/L (ref 3.4–5.3)
PROT SERPL-MCNC: 6.3 G/DL (ref 6.4–8.3)
RBC # BLD AUTO: 3.07 10E6/UL (ref 3.8–5.2)
SODIUM SERPL-SCNC: 140 MMOL/L (ref 136–145)
SPECIMEN EXPIRATION DATE: NORMAL
TACROLIMUS BLD-MCNC: 4.8 UG/L (ref 5–15)
TME LAST DOSE: ABNORMAL H
TME LAST DOSE: ABNORMAL H
TSH SERPL DL<=0.005 MIU/L-ACNC: 2.31 UIU/ML (ref 0.3–4.2)
WBC # BLD AUTO: 9.8 10E3/UL (ref 4–11)

## 2023-08-14 PROCEDURE — 86850 RBC ANTIBODY SCREEN: CPT

## 2023-08-14 PROCEDURE — 250N000011 HC RX IP 250 OP 636: Mod: JZ

## 2023-08-14 PROCEDURE — 250N000012 HC RX MED GY IP 250 OP 636 PS 637

## 2023-08-14 PROCEDURE — 255N000002 HC RX 255 OP 636: Performed by: PHYSICIAN ASSISTANT

## 2023-08-14 PROCEDURE — 36415 COLL VENOUS BLD VENIPUNCTURE: CPT

## 2023-08-14 PROCEDURE — 250N000013 HC RX MED GY IP 250 OP 250 PS 637

## 2023-08-14 PROCEDURE — 85014 HEMATOCRIT: CPT

## 2023-08-14 PROCEDURE — 0T25X0Z CHANGE DRAINAGE DEVICE IN KIDNEY, EXTERNAL APPROACH: ICD-10-PCS | Performed by: PHYSICIAN ASSISTANT

## 2023-08-14 PROCEDURE — 85384 FIBRINOGEN ACTIVITY: CPT

## 2023-08-14 PROCEDURE — 250N000011 HC RX IP 250 OP 636

## 2023-08-14 PROCEDURE — 99233 SBSQ HOSP IP/OBS HIGH 50: CPT | Performed by: INTERNAL MEDICINE

## 2023-08-14 PROCEDURE — 93005 ELECTROCARDIOGRAM TRACING: CPT

## 2023-08-14 PROCEDURE — 80053 COMPREHEN METABOLIC PANEL: CPT

## 2023-08-14 PROCEDURE — 36415 COLL VENOUS BLD VENIPUNCTURE: CPT | Performed by: OBSTETRICS & GYNECOLOGY

## 2023-08-14 PROCEDURE — 120N000002 HC R&B MED SURG/OB UMMC

## 2023-08-14 PROCEDURE — C1729 CATH, DRAINAGE: HCPCS

## 2023-08-14 PROCEDURE — C1769 GUIDE WIRE: HCPCS

## 2023-08-14 PROCEDURE — 84443 ASSAY THYROID STIM HORMONE: CPT

## 2023-08-14 PROCEDURE — 85610 PROTHROMBIN TIME: CPT

## 2023-08-14 PROCEDURE — 250N000013 HC RX MED GY IP 250 OP 250 PS 637: Performed by: OBSTETRICS & GYNECOLOGY

## 2023-08-14 PROCEDURE — 80197 ASSAY OF TACROLIMUS: CPT | Performed by: OBSTETRICS & GYNECOLOGY

## 2023-08-14 PROCEDURE — 85730 THROMBOPLASTIN TIME PARTIAL: CPT

## 2023-08-14 PROCEDURE — 86901 BLOOD TYPING SEROLOGIC RH(D): CPT

## 2023-08-14 PROCEDURE — 50435 EXCHANGE NEPHROSTOMY CATH: CPT

## 2023-08-14 PROCEDURE — 50435 EXCHANGE NEPHROSTOMY CATH: CPT | Mod: RT | Performed by: PHYSICIAN ASSISTANT

## 2023-08-14 PROCEDURE — 99233 SBSQ HOSP IP/OBS HIGH 50: CPT | Mod: GC | Performed by: OBSTETRICS & GYNECOLOGY

## 2023-08-14 PROCEDURE — 250N000009 HC RX 250: Performed by: PHYSICIAN ASSISTANT

## 2023-08-14 RX ORDER — CLINDAMYCIN PHOSPHATE 900 MG/50ML
900 INJECTION, SOLUTION INTRAVENOUS
Status: COMPLETED | OUTPATIENT
Start: 2023-08-14 | End: 2023-08-14

## 2023-08-14 RX ORDER — LIDOCAINE 40 MG/G
CREAM TOPICAL
Status: DISCONTINUED | OUTPATIENT
Start: 2023-08-14 | End: 2023-08-15 | Stop reason: HOSPADM

## 2023-08-14 RX ORDER — IODIXANOL 320 MG/ML
50 INJECTION, SOLUTION INTRAVASCULAR ONCE
Status: COMPLETED | OUTPATIENT
Start: 2023-08-14 | End: 2023-08-14

## 2023-08-14 RX ADMIN — NIFEDIPINE 60 MG: 30 TABLET, FILM COATED, EXTENDED RELEASE ORAL at 06:09

## 2023-08-14 RX ADMIN — LIDOCAINE HYDROCHLORIDE 1 ML: 10 INJECTION, SOLUTION EPIDURAL; INFILTRATION; INTRACAUDAL; PERINEURAL at 09:39

## 2023-08-14 RX ADMIN — MAGNESIUM OXIDE TAB 400 MG (241.3 MG ELEMENTAL MG) 800 MG: 400 (241.3 MG) TAB at 08:40

## 2023-08-14 RX ADMIN — Medication 50 MCG: at 08:40

## 2023-08-14 RX ADMIN — ACETAMINOPHEN 650 MG: 325 TABLET, FILM COATED ORAL at 02:13

## 2023-08-14 RX ADMIN — SODIUM BICARBONATE 650 MG TABLET 1300 MG: at 22:48

## 2023-08-14 RX ADMIN — FAMOTIDINE 20 MG: 20 TABLET ORAL at 15:15

## 2023-08-14 RX ADMIN — CEFTAZIDIME 1 G: 1 INJECTION, POWDER, FOR SOLUTION INTRAMUSCULAR; INTRAVENOUS at 06:11

## 2023-08-14 RX ADMIN — POLYETHYLENE GLYCOL 3350 17 G: 17 POWDER, FOR SOLUTION ORAL at 17:22

## 2023-08-14 RX ADMIN — IODIXANOL 10 ML: 320 INJECTION, SOLUTION INTRAVASCULAR at 09:40

## 2023-08-14 RX ADMIN — CEFTAZIDIME 1 G: 1 INJECTION, POWDER, FOR SOLUTION INTRAMUSCULAR; INTRAVENOUS at 14:29

## 2023-08-14 RX ADMIN — CLINDAMYCIN PHOSPHATE 900 MG: 900 INJECTION, SOLUTION INTRAVENOUS at 08:38

## 2023-08-14 RX ADMIN — MYCOPHENOLIC ACID 540 MG: 180 TABLET, DELAYED RELEASE ORAL at 10:55

## 2023-08-14 RX ADMIN — MYCOPHENOLIC ACID 540 MG: 180 TABLET, DELAYED RELEASE ORAL at 23:04

## 2023-08-14 RX ADMIN — TACROLIMUS 4 MG: 1 CAPSULE ORAL at 23:04

## 2023-08-14 RX ADMIN — NIFEDIPINE 60 MG: 30 TABLET, FILM COATED, EXTENDED RELEASE ORAL at 18:52

## 2023-08-14 RX ADMIN — CEFTAZIDIME 1 G: 1 INJECTION, POWDER, FOR SOLUTION INTRAMUSCULAR; INTRAVENOUS at 22:48

## 2023-08-14 RX ADMIN — ACETAMINOPHEN 650 MG: 325 TABLET, FILM COATED ORAL at 18:58

## 2023-08-14 RX ADMIN — PRENATAL VITAMINS-IRON FUMARATE 27 MG IRON-FOLIC ACID 0.8 MG TABLET 1 TABLET: at 22:48

## 2023-08-14 RX ADMIN — SODIUM BICARBONATE 650 MG TABLET 1300 MG: at 14:33

## 2023-08-14 RX ADMIN — ACETAMINOPHEN 650 MG: 325 TABLET, FILM COATED ORAL at 08:44

## 2023-08-14 RX ADMIN — ACETAMINOPHEN 650 MG: 325 TABLET, FILM COATED ORAL at 13:12

## 2023-08-14 RX ADMIN — DOCUSATE SODIUM 100 MG: 100 CAPSULE, LIQUID FILLED ORAL at 08:39

## 2023-08-14 RX ADMIN — SODIUM BICARBONATE 650 MG TABLET 1300 MG: at 08:40

## 2023-08-14 RX ADMIN — CYANOCOBALAMIN TAB 1000 MCG 1000 MCG: 1000 TAB at 08:40

## 2023-08-14 RX ADMIN — TACROLIMUS 4 MG: 1 CAPSULE ORAL at 10:59

## 2023-08-14 RX ADMIN — SIMETHICONE 125 MG: 125 TABLET, CHEWABLE ORAL at 19:20

## 2023-08-14 RX ADMIN — LEVOTHYROXINE SODIUM 25 MCG: 25 TABLET ORAL at 08:28

## 2023-08-14 ASSESSMENT — ACTIVITIES OF DAILY LIVING (ADL)
ADLS_ACUITY_SCORE: 20

## 2023-08-14 NOTE — PROCEDURES
[facility]    Procedure: PNT change    Date/Time: 8/14/2023 9:49 AM    Performed by: Baljit Ochoa PA-C  Authorized by: Baljit Ochoa PA-C      UNIVERSAL PROTOCOL   Site Marked: No  Prior Images Obtained and Reviewed:  Yes  Required items: Required blood products, implants, devices and special equipment available    Patient identity confirmed:  Verbally with patient, provided demographic data, hospital-assigned identification number and arm band  NA - No sedation, light sedation, or local anesthesia  Confirmation Checklist:  Patient's identity using two indicators, relevant allergies, procedure was appropriate and matched the consent or emergent situation and correct equipment/implants were available  Time out: Immediately prior to the procedure a time out was called    Universal Protocol: the Joint Commission Universal Protocol was followed    Preparation: Patient was prepped and draped in usual sterile fashion       ANESTHESIA    Anesthesia:  Local infiltration  Local Anesthetic:  Lidocaine 1% without epinephrine  Anesthetic Total (mL):  1      SEDATION    Patient Sedated: No    Fluoroscopy Time: 1 minute(s)  See dictated procedure note for full details.  Findings: Tolerated well without sedation    Specimens: none    Complications: None    Condition: Stable    Plan: Return in 4 weeks for repeat antegrade nephrostogram and either removal or exchange      PROCEDURE  Describe Procedure: Right transplant PNT exchanged. Antegrade nephrostagram without propagation of contrast to bladder. Possible UPJ obstruction 2 days post partum  Patient Tolerance:  Patient tolerated the procedure well with no immediate complications  Length of time physician/provider present for 1:1 monitoring during sedation: 0

## 2023-08-14 NOTE — PROVIDER NOTIFICATION
08/14/23 0823   Provider Notification   Provider Name/Title Manasa   Method of Notification Electronic Page   Request Evaluate-Remote   Notification Reason Other     Do you want Clindamycin ABX administered up here before IR or is this to be administered in IR?  Please call.

## 2023-08-14 NOTE — CONSULTS
Social Work Progress Note      DATA    Patient is a 32 year old female diagnosed with Pyelonephritis affecting pregnancy in third trimester. Admitted for postpartum after delivering a baby at 34w1d. Assessment completed with patient and parents on weekend see previous consult note. New consult for [place] of 10.    ASSESSMENT    Patient appeared quiet during visit today. Patient has no concerns about her mental health.  She reports this has been a tough pregnancy with her kidney issues and she is glad baby is here and doing well.  Mona is aware of resources for PMAD if symptoms were to increase.     INTERVENTION    Conducted chart review and consulted with medical team regarding plan of care.  Provided assessment of patient and family's level of coping  Validated emotions and provided supportive listening    PLAN    Continue care. Writer will continue to follow and provide support throughout admission.     Prachi ZUNIGA, MSW, [facility]  Maternal Child Health   [phone]--office  [phone]--pager

## 2023-08-14 NOTE — PROGRESS NOTES
Nephrology Progress Note  08/14/2023       History of Present Illness  Mona Wagner is a 32 year old female past medical history of ESKD of unclear etiology, status post DDKT in 2019, CKD, high risk pregnancy admitted for increased contractions.  On admission noted to have creatinine elevation from baseline of 1.3-1 0.4 to 1.6 and also noted to have hyperkalemia at potassium of 5.5, repeat was 6.1.  There is associated metabolic acidosis as well.  Patient endorsed having pain in her right lower quadrant that is radiating into her groin and contractions at the same time. UA was dirty and culture was positive.      Interval History : She reports that her LE edema has improved significantly. She denies any other concerns today. She had her PNT exchanged this morning. Antegrade nephrostagram was concerning for persistent UPJ obstruction. Her creatinine continues to improve further today.     ASSESSMENT/RECOMMENDATIONS  # DDKT: ESKD presumedf rom IgAN but never has a kidney Bx Dx at age 23              - Baseline Creatinine: ~ 1.2-1.4 with pregnancy, Pre-pregnancy was 0.8-1.1               - Proteinuria: Mild (0.5-1.0 grams), stated to trend up since July 2023              - Date DSA Last Checked: -Sep/2022      Latest DSA: No, cPRA : 25%              - BK Viremia: No              - Kidney Tx Biopsy: Sep 17, 2019; Result: No diagnostic evidence of acute rejection.   She has several issues impacting her kidney function. She developed ureteral stenosis (PNT's now), a UTI most recently (resolved) but we have also seen progression of her underlying IgA as evidenced by her increasing proteinuria.     # Transplant kidney pyelonephritis  On IV cefazolin per ID.      # Immunosuppression: Tacrolimus immediate release (goal 4-6) and Azathioprine (dose 150 mg daily)              - Patient is in an immunosuppressed state and will continue to monitor for efficacy and toxicity of immunosuppression medications.              -  She was on  azathioprine during pregnancy and this was switched to mycophenolate acid 540 mg twice daily after delivery   - Last tacrolimus level was 5.9; currently on Tac 4 mg twice a day  - Will check Tac level today (added on) and again on Monday morning  # Metabolic acidosis  She is on bicarb 1300 mg 3 times daily.  #Hyperkalemia; resolved; she has patiromer prn at home  # Ureteral Stenosis/Hydronephrosis: Patient with moderate hydronephrosis of kidney transplant and developed CAMERON.  She underwent PNT 6/9/23 with repeat kidney transplant ultrasound 6/11 still showing moderate hydronephrosis suggesting this may be a functional hydronephrosis from pregnancy. However, patient does report resolution of edema and dyspnea, as well as apparent post-obstructive diuresis.  She has a previous h/o ureteral stenosis with ureteral stent removed 2/2021.  Previous kidney transplant ultrasound 7/2021 also showed moderate hydronephrosis unchanged with voiding. Hydronephrosis was unchanged on 7/6/22 abdominal CT. Most recent U/S 7/5/23 with improvement in hydronephrosis with PNT in place. Now only 10-15% of her urine is coming from her urethra.. so likely she still needs PNT. Proceed with planned PNT change tomorrow.                - Now making some urine through urethra but still only 10-15   - Neph tube exchanged 8/14, Antegrade nephrostagram was concerning for persistent UPJ obstruction. Exchange and eval again planned in 4 weeks     # Hypertension: Pressure fluctuates with sometimes systolic in the 150s and diastolic in the 90s. Currently on nifedipine ER 60 mg twice daily.  Has reaction with lisinopril in the past and labetalol (dol nipple). Can add metoprolol 12.5 mg BID if BP persistently above 140/90 mmHg.  # Anemia: She is on Aranesp outpatient for Hb < 10. Her next dose os due this coming Thursday, will continue and administer 40 mcg.  # Left leg edema - resolved   # Post delivery pharmacologic management: She does not plan to  breast feed. Prior to her pregnancy she was on myfortic and expect we will transition to that post delivery. If her blood pressure remains elevated to the point of treatment we can use any agents (again because she does not intend to breast feed).     Recommendations were communicated to primary team via in person and note.     Jasmina Marrero MD     Review of Systems:   10 systems neg except as mentioned above.     Physical Exam:   I/O last 3 completed shifts:  In: 1600 [P.O.:1600]  Out: 5950 [Urine:5950]  /74  Pulse 78  Resp 16 SpO2 99%  Wt 80.3 kg  GENERAL APPEARANCE: alert and no distress  EYES:  no scleral icterus, pupils equal  PULM: no respiratory distress   CV: RRR      -edema: Lt leg 1+ pitting edema  MS: no evidence of inflammation in joints, no muscle tenderness  NEURO: mentation intact and speech normal   ABD; + RLQ perc tube, site clean and ry.     Labs:   All labs reviewed by me  Electrolytes/Renal -   Recent Labs   Lab Test 08/13/23  0633 08/12/23  0849 08/11/23  0745 07/17/23  1024 07/13/23  1015 07/02/23  1704 06/29/23  1300 06/22/23  1007 06/19/23  1028 06/16/23  0723 06/15/23  1009 06/12/23  0640 06/12/23  0235    139 138   < > 135*   < >  --    < > 133*   < > 133*   < > 133*   POTASSIUM 4.9 5.3 4.9   < > 5.0   < >  --    < > 5.2   < > 4.8   < > 5.7*   CHLORIDE 107 109* 108*   < > 104   < >  --    < > 103   < > 100   < > 103   CO2 21* 19* 20*   < > 21*   < >  --    < > 19*   < > 20*   < > 17*   BUN 26.6* 22.7* 22.6*   < > 29.8*   < >  --    < > 34.4*   < > 27.6*   < > 26.1*   CR 1.17* 1.27* 1.12*   < > 1.38*   < >  --    < > 1.30*   < > 1.25*   < > 1.30*   GLC 73 82 89   < > 94   < >  --    < > 89   < > 138*   < > 119*   SHAMIR 9.0 8.7 9.1   < > 9.3   < >  --    < > 9.7   < > 9.6   < > 9.8   MAG  --   --   --   --  2.3  --  2.0  --  1.7   < > 1.3*   < > 1.6*   PHOS  --   --   --   --  3.3  --   --   --   --   --  3.6  --  2.6    < > = values in this interval not displayed.         CBC  -   Recent Labs   Lab Test 08/13/23  0633 08/12/23  0849 08/11/23  0745   WBC 9.9 14.0* 9.2   HGB 9.4* 11.1* 10.8*    201 182         LFTs -   Recent Labs   Lab Test 08/13/23  0633 08/12/23  0849 08/11/23  0745   ALKPHOS 104 132* 134*   BILITOTAL 0.8 1.1 1.0   ALT 30 38 35   AST 29 38 33   PROTTOTAL 5.9* 6.6 6.6   ALBUMIN 2.9* 3.2* 3.2*         Iron Panel -   Recent Labs   Lab Test 07/31/23  1017 05/22/23  1023 05/22/23  1022 04/25/23  1035   IRON 94  --  80 49   IRONSAT 35  --  41 27   MONET 1,525*   < >  --  1,770*    < > = values in this interval not displayed.           Imaging:  Reviewed      Current Medications:   cefTAZidime  1 g Intravenous Q8H    cyanocobalamin  1,000 mcg Oral QAM    docusate sodium  100 mg Oral Daily    levothyroxine  25 mcg Oral Daily    magnesium oxide  800 mg Oral QAM    mycophenolic acid  540 mg Oral BID IS    NIFEdipine ER OSMOTIC  60 mg Oral Q12H    prenatal multivitamin w/iron  1 tablet Oral QPM    sodium bicarbonate  1,300 mg Oral TID    sodium chloride (PF)  3 mL Intracatheter Q8H    tacrolimus  4 mg Oral BID    vitamin D3  50 mcg Oral QAM      - MEDICATION INSTRUCTIONS -      - MEDICATION INSTRUCTIONS -      oxytocin in 0.9% NaCl      oxytocin in 0.9% NaCl Stopped (08/12/23 0353)    sodium chloride 0.9%         Jasmina Marrero MD

## 2023-08-14 NOTE — PROVIDER NOTIFICATION
08/14/23 1419   Provider Notification   Provider Name/Title Keny   Method of Notification Electronic Page   Request Evaluate-Remote   Notification Reason Lab Results     pts tac level drawn early this morning- would you like it redrawn tonight? ( 12 hours after medication administered)  please advise.  Thanks!  Nathalia [id number]

## 2023-08-14 NOTE — PLAN OF CARE
Goal Outcome Evaluation:      Plan of Care Reviewed With: patient    Overall Patient Progress: improvingOverall Patient Progress: improving     AFVSS-did have one mildly elevated diastolic this am. Postpartum assessments WDL. Patient denies headache, visual changes or epigastric pain. Voiding/nephrostomy tube draining adequate amounts of urine. Patient is ambulating without difficulty. Independent with self cares. Patient state feeling better as edema decreasing in hands and LEs. She has been NPO overnight for nephrostomy tube change this am. She did take tylenol during night and procardia this am per ok from Dr. Castro. States cramping pain and back pain controlled with tylenol. Continues to work on birth certificate and ROP. Continue present cares.

## 2023-08-14 NOTE — PROGRESS NOTES
Post Partum Progress Note  PPD#2    Subjective:  Mona is doing well this morning, sitting up in bed. Overall doing well with pain control. Questions about removal of PNT vs exchange. Patient desires PNT to be removed. She is tolerating PO intake. Lochia minimal. She is voiding through her PNT tube and also through the urethra on multiple occasions. She passing flatus and having a BM. Ambulating well, just a small amount of dizziness which she thinks are secondary to the large fluid shift. No spots or stars in her vision or headaches. She denies nausea/vomiting, chest pain, shortness of breath, RUQ pain. Patient is formula feeding infant who is stable in the NICU.    Objective:  Vitals:    08/13/23 1803 08/13/23 2210 08/14/23 0207 08/14/23 0608   BP: 119/83 128/83 126/89 (!) 125/90   BP Location:  Right arm  Right arm   Patient Position:  Semi-Carrasco's  Semi-Carrasco's   Cuff Size:  Adult Regular  Adult Regular   Pulse: 86 80  76   Resp: 16 16  16   Temp: 98.2  F (36.8  C) 98.1  F (36.7  C)  98.1  F (36.7  C)   TempSrc: Oral Oral  Oral   SpO2: 98%      Weight:    74.8 kg (164 lb 12.8 oz)   Height:         General: NAD. A&Ox3.  CV: Regular rate and rhythm, well perfused.   Pulm: Normal respiratory effort, CTAB   Abd: Soft, appropriately tender, non-distended. Fundus is firm and 1 cm below the umbilicus. PNT in place  Ext: 1+ lower extremity edema on L, trace edema on R. No calf tenderness.    Results for orders placed or performed during the hospital encounter of 08/03/23 (from the past 24 hour(s))   US Lower Extremity Venous Duplex Bilateral    Narrative    EXAMINATION: DOPPLER VENOUS ULTRASOUND OF BILATERAL LOWER EXTREMITIES,  8/13/2023 4:52 PM     COMPARISON: Ultrasound 8/16/2019    HISTORY: L > R edema, hx DVT    TECHNIQUE:  Gray-scale evaluation with compression, spectral flow and  color Doppler assessment of the deep venous system of both legs from  groin to knee, and then at the ankles.    FINDINGS:  In both  lower extremities, the common femoral, femoral, popliteal and  posterior tibial veins demonstrate normal compressibility and blood  flow.      Impression    IMPRESSION:  No evidence of deep venous thrombosis in either lower extremity.    I have personally reviewed the examination and initial interpretation  and I agree with the findings.    MARIANA FREY MD         SYSTEM ID:  [id number]     *Note: Due to a large number of results and/or encounters for the requested time period, some results have not been displayed. A complete set of results can be found in Results Review.         CMP, CBC, INR, T&S and Tacro level pending    Assessment/Plan:  Mona Wagner is a 33 yo G1 now  PPD#2 s/p  at 34w1d after IOL due to worsening renal function in the setting of IgA nephropathy s/p renal transplant () complicated by allograft hydronephosis s/p PNT placement () and recurrent UTI. Her pregnancy is otherwise complicated by chronic hypertension (normotensive not on medications since her transplant) with superimposed preeclampsia without severe features, secondary renal hyperparathyroidism, hypothyroidism, FATOUMATA, history of an upper extremity DVT at her dialysis catheter site and prior bacterial endocarditis with justin echo this pregnancy.     Routine postpartum cares  - Encourage routine postpartum goals including ambulation and incentive spirometry  - PNC: Rh +. Rubella non-immune. Will confirm with nephrology that they have no objections to MMR vaccine prior to discharge given patient is immunosuppressed.  - Pain: controlled on oral medications, no NSAIDs  - Heme: Hgb 10.8>EBL 91mL> 11.1.   - GI: continue anti-emetics and stool softeners as needed.  - : PNT tube in place, patient also voiding spontaneously  - Infant: Stable in NICU  - Feeding: Plans on bottle feeding.  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 She has been afebrile throughout admission. No leukocytosis or other clinical evidence of pyelonephritis at present. Possible colonization of PNT.  - Nephrology following, appreciate recommendations  - Day #11 Ceftazidime q8h - plan to continue until discharge, or 10-14 days (which ever occurs first).   - PNT in place since 6/9 (exchange scheduled for 8/14), patient has been intermittently voiding spontaneously since delivery. Cr overall improving, last 1.17. Planning for PNT exchange today  - Tacrolimus level therapeutic (goal 4-6); 6.4. Will check trough level 30 minutes before morning dose per Nephrology.  - Per Nephrology, Azathioprine discontinued and Mycophenolic Acid restarted at 540mg BID  - CMP daily      Superimposed pre-eclampsia without severe features  Chronic hypertension  Patient with history of hypertension prior to transplant. Has not required antihypertensives since then and was not started on antihypertensives during this pregnancy until this admission. Though patient did have prior admissions in this pregnancy with severe range BPs in the setting of renal dysfunction/allograft hydronephrosis. Patient meets criteria for superimposed pre-eclampsia without SF.  - Continue to monitor blood pressure closely, reviewed signs/symptoms of preeclampsia  - Goal blood pressure < 130/80 mmHg  - Cr improving 1.17 on 8/13, AM pending, other HELLP labs wnl 8/13  - D#4 nifedipine 60 mg BID. Patient above goal > 50%, will discuss possible addition of a second antihypertensive with Nephrology  - Daily HELLP labs    Hyperkalemia-resolved  - EKG 8/5 with no acute changes.  - Potassium pending today  - Continue to monitor CMP daily     Hypothyroidism  Secondary hyperparathyroidism with hypercalcemia   S/p resection of parathyroid glands on 4/28. Follows  with endocrinology.  - Continue levothyroxine 50 mcg QD  - 6/16/23 TSH 2.51     History of DVT  L>R Lower Extremity Edema  History of upper extremity DVT in 2014 associated with dialysis catheter. Assessment for inherited thrombophilias including Factor V Leiden and prothrombin gene mutation were negative. APLS testing in this pregnancy notable only for weakly positive cardiolipin IgM Ab. LE Doppler negative for DVT.  - Encouraged ambulation  - No indication for anticoagulation at this time     History of bacterial endocarditis  No acute issues. Last echo on 3/20 with LV hyperkinetic and LVEF >70%, RV function normal.    SBE prophylaxis was not indicated      FATOUMATA  - Continue PTA CPAP      Cholestasis of Pregnancy  Bile Acids 47 (8/3/2023). AST and ALT wnl today.   - Ursodiol now that patient is postpartum    Continue postpartum care and inpatient management.    Kane Goel MD, MPH  Ob/Gyn Resident, PGY-3  08/14/23 7:59 AM

## 2023-08-14 NOTE — PLAN OF CARE
Goal Outcome Evaluation:  Pt had an episode of heart palpitations and SOB with ambulation which resolved quickly- however ordered a STAT EKG per providers request. BP stable.

## 2023-08-14 NOTE — PROVIDER NOTIFICATION
"   08/14/23 1804   Provider Notification   Provider Name/Title Manasa   Method of Notification Electronic Page   Request Evaluate-Remote   Notification Reason Other     Pt reports feeling lightheaded when getting OOB also feeling \"clammy\" pt relates this feeling similar to when she had low BP during pregnancy-  Pt is wondering is we could cut back on Nifedipine dose.  please advise.   "

## 2023-08-14 NOTE — PLAN OF CARE
Goal Outcome Evaluation: VSS, postpartum assessment WDL.  Scant amount of lochia, fundus firm and below U. Denies any pre-e symptoms.  Pt did report some SOB and heart palpitations this morning, EKG ordered- per MD no concerns. Neph tube exchange done this morning- pain controlled with Tylenol, dressing CDI.  Pt has been emptying nephrostomy tube indpendently and writing amount on the whiteboard.  Up independently and steady on feet, tolerating PO, +gas.  Visited infant in NICU this afternoon.  PIV SL in between IV abx. Repeat Tacrolimus level scheduled for 2300 tonight. ( Was draw too early this morning) Pt is able to make needs known, call light within reach - continue with education and POC.

## 2023-08-14 NOTE — PROVIDER NOTIFICATION
08/14/23 1417   Provider Notification   Provider Name/Title Manasa   Method of Notification Electronic Page   Request Evaluate-Remote   Notification Reason Other     pt reported some SOB and heart palpitations this am- EKG completed, could you please review.  Also orthostatic BP done and Pulse tachy with standing. Please advise.

## 2023-08-14 NOTE — LACTATION NOTE
This note was copied from a baby's chart.  Brief Lactation Consult    Checked in with mother as it was reported she planned to exclusively formula feed. Mother confirms formula feeding is her plan. LC will complete consult at this time. Please re-consult as needed.         Olga Woodson RN, IBCLC   Lactation Consultant  Ascom: *[phone]  Office: [phone]

## 2023-08-14 NOTE — PROGRESS NOTES
CLINICAL NUTRITION SERVICES    Reviewed nutrition risk factors due to LOS. Pt is tolerating diet, eating well per nursing documentation. No nutrition issues identified at this time. RD to sign off at this time. RD may be consulted if needs arise.     Angelina Trevizo MS, RDN, LD  RD pager: [phone]

## 2023-08-14 NOTE — PROVIDER NOTIFICATION
08/14/23 1337   Provider Notification   Provider Name/Title Burn   Method of Notification Electronic Page   Request Evaluate-Remote   Notification Reason Other     Pt did report some SOB and heart palp. this am, EKG ordered- could you please review, also orthostatic BPs complete, HR tachy with standing.  Please advise. Thanks!

## 2023-08-15 VITALS
SYSTOLIC BLOOD PRESSURE: 111 MMHG | TEMPERATURE: 97.8 F | BODY MASS INDEX: 32.07 KG/M2 | WEIGHT: 163.36 LBS | DIASTOLIC BLOOD PRESSURE: 68 MMHG | RESPIRATION RATE: 18 BRPM | OXYGEN SATURATION: 99 % | HEIGHT: 60 IN | HEART RATE: 90 BPM

## 2023-08-15 DIAGNOSIS — N18.30 STAGE 3 CHRONIC KIDNEY DISEASE, UNSPECIFIED WHETHER STAGE 3A OR 3B CKD (H): ICD-10-CM

## 2023-08-15 DIAGNOSIS — Z94.0 KIDNEY REPLACED BY TRANSPLANT: ICD-10-CM

## 2023-08-15 DIAGNOSIS — N18.31 STAGE 3A CHRONIC KIDNEY DISEASE (H): ICD-10-CM

## 2023-08-15 PROBLEM — O14.90 PREECLAMPSIA: Status: ACTIVE | Noted: 2023-08-15

## 2023-08-15 LAB
TACROLIMUS BLD-MCNC: 4.5 UG/L (ref 5–15)
TME LAST DOSE: ABNORMAL H
TME LAST DOSE: ABNORMAL H

## 2023-08-15 PROCEDURE — 250N000013 HC RX MED GY IP 250 OP 250 PS 637

## 2023-08-15 PROCEDURE — 250N000013 HC RX MED GY IP 250 OP 250 PS 637: Performed by: OBSTETRICS & GYNECOLOGY

## 2023-08-15 PROCEDURE — 250N000012 HC RX MED GY IP 250 OP 636 PS 637

## 2023-08-15 PROCEDURE — 250N000011 HC RX IP 250 OP 636: Mod: JZ

## 2023-08-15 PROCEDURE — 99239 HOSP IP/OBS DSCHRG MGMT >30: CPT | Mod: GC | Performed by: STUDENT IN AN ORGANIZED HEALTH CARE EDUCATION/TRAINING PROGRAM

## 2023-08-15 RX ORDER — DIPHENHYDRAMINE HYDROCHLORIDE 50 MG/ML
25 INJECTION INTRAMUSCULAR; INTRAVENOUS EVERY 6 HOURS PRN
Status: DISCONTINUED | OUTPATIENT
Start: 2023-08-15 | End: 2023-08-15 | Stop reason: HOSPADM

## 2023-08-15 RX ORDER — LEVOTHYROXINE SODIUM 25 UG/1
25 TABLET ORAL DAILY
Qty: 30 TABLET | Refills: 1 | Status: SHIPPED | OUTPATIENT
Start: 2023-08-16 | End: 2023-10-06

## 2023-08-15 RX ORDER — SODIUM CHLORIDE 9 MG/ML
INJECTION, SOLUTION INTRAVENOUS
Status: COMPLETED
Start: 2023-08-15 | End: 2023-08-15

## 2023-08-15 RX ORDER — DIPHENHYDRAMINE HCL 25 MG
25 CAPSULE ORAL EVERY 6 HOURS PRN
Status: DISCONTINUED | OUTPATIENT
Start: 2023-08-15 | End: 2023-08-15 | Stop reason: HOSPADM

## 2023-08-15 RX ORDER — MYCOPHENOLIC ACID 180 MG/1
540 TABLET, DELAYED RELEASE ORAL 2 TIMES DAILY
Qty: 60 TABLET | Refills: 1 | Status: SHIPPED | OUTPATIENT
Start: 2023-08-15 | End: 2023-08-18

## 2023-08-15 RX ORDER — TACROLIMUS 1 MG/1
4 CAPSULE ORAL 2 TIMES DAILY
Qty: 60 CAPSULE | Refills: 0 | Status: SHIPPED | OUTPATIENT
Start: 2023-08-15 | End: 2023-08-24

## 2023-08-15 RX ADMIN — DOCUSATE SODIUM 100 MG: 100 CAPSULE, LIQUID FILLED ORAL at 08:21

## 2023-08-15 RX ADMIN — CEFTAZIDIME 1 G: 1 INJECTION, POWDER, FOR SOLUTION INTRAMUSCULAR; INTRAVENOUS at 14:00

## 2023-08-15 RX ADMIN — CYANOCOBALAMIN TAB 1000 MCG 1000 MCG: 1000 TAB at 08:28

## 2023-08-15 RX ADMIN — MYCOPHENOLIC ACID 540 MG: 180 TABLET, DELAYED RELEASE ORAL at 09:50

## 2023-08-15 RX ADMIN — LEVOTHYROXINE SODIUM 25 MCG: 25 TABLET ORAL at 08:21

## 2023-08-15 RX ADMIN — TACROLIMUS 4 MG: 1 CAPSULE ORAL at 09:50

## 2023-08-15 RX ADMIN — ACETAMINOPHEN 650 MG: 325 TABLET, FILM COATED ORAL at 06:36

## 2023-08-15 RX ADMIN — SODIUM BICARBONATE 650 MG TABLET 1300 MG: at 13:59

## 2023-08-15 RX ADMIN — MAGNESIUM OXIDE TAB 400 MG (241.3 MG ELEMENTAL MG) 800 MG: 400 (241.3 MG) TAB at 08:22

## 2023-08-15 RX ADMIN — Medication 50 MCG: at 08:21

## 2023-08-15 RX ADMIN — ACETAMINOPHEN 650 MG: 325 TABLET, FILM COATED ORAL at 00:33

## 2023-08-15 RX ADMIN — SODIUM BICARBONATE 650 MG TABLET 1300 MG: at 08:21

## 2023-08-15 RX ADMIN — DIPHENHYDRAMINE HYDROCHLORIDE 25 MG: 25 CAPSULE ORAL at 00:30

## 2023-08-15 RX ADMIN — CEFTAZIDIME 1 G: 1 INJECTION, POWDER, FOR SOLUTION INTRAMUSCULAR; INTRAVENOUS at 06:38

## 2023-08-15 RX ADMIN — NIFEDIPINE 60 MG: 30 TABLET, FILM COATED, EXTENDED RELEASE ORAL at 06:36

## 2023-08-15 ASSESSMENT — ACTIVITIES OF DAILY LIVING (ADL)
ADLS_ACUITY_SCORE: 20

## 2023-08-15 NOTE — PLAN OF CARE
Goal Outcome Evaluation:  Discharge instructions read and explained to patient, all questions answered. Medications and dressing supplies given to patient. Medication double checked with an other RN . Refused BP machine and said she has one at home. Iv removed prior to discharge. Patient discharged to home with all belongings.

## 2023-08-15 NOTE — DISCHARGE INSTRUCTIONS
Warning Signs after Having a Baby    Keep this paper on your fridge or somewhere else where you can see it.    Call your provider if you have any of these symptoms up to 12 weeks after having your baby.    Thoughts of hurting yourself or your baby  Pain in your chest or trouble breathing  Severe headache not helped by pain medicine  Eyesight concerns (blurry vision, seeing spots or flashes of light, other changes to eyesight)  Fainting, shaking or other signs of a seizure    Call 9-1-1 if you feel that it is an emergency.     The symptoms below can happen to anyone after giving birth. They can be very serious. Call your provider if you have any of these warning signs.    My provider s phone number: _______________________    Losing too much blood (hemorrhage)    Call your provider if you soak through a pad in less than an hour or pass blood clots bigger than a golf ball. These may be signs that you are bleeding too much.    Blood clots in the legs or lungs    After you give birth, your body naturally clots its blood to help prevent blood loss. Sometimes this increased clotting can happen in other areas of the body, like the legs or lungs. This can block your blood flow and be very dangerous.     Call your provider if you:  Have a red, swollen spot on the back of your leg that is warm or painful when you touch it.   Are coughing up blood.     Infection    Call your provider if you have any of these symptoms:  Fever of 100.4 F (38 C) or higher.  Pain or redness around your stitches if you had an incision.   Any yellow, white, or green fluid coming from places where you had stitches or surgery.    Mood Problems (postpartum depression)    Many people feel sad or have mood changes after having a baby. But for some people, these mood swings are worse.     Call your provider right away if you feel so anxious or nervous that you can't care for yourself or your baby.    Preeclampsia (high blood pressure)    Even if you  didn't have high blood pressure when you were pregnant, you are at risk for the high blood pressure disease called preeclampsia. This risk can last up to 12 weeks after giving birth.     Call your provider if you have:   Pain on your right side under your rib cage  Sudden swelling in the hands and face    Remember: You know your body. If something doesn't feel right, get medical help.     For informational purposes only. Not to replace the advice of your health care provider. Copyright 2020 [facility]. All rights reserved. Clinically reviewed by Milagros Montoya, RNC-OB, MSN. Veezeon [id number] - Rev 02/23.    Postpartum Vaginal Delivery Instructions    Activity     Ask family and friends for help when you need it.  Do not place anything in your vagina for 6 weeks.  You are not restricted on other activities, but take it easy for a few weeks to allow your body to recover from delivery.  You are able to do any activities you feel up to that point.  No driving until you have stopped taking your pain medications (usually two weeks after delivery).     Call your health care provider if you have any of these symptoms:     Increased pain, swelling, redness, or fluid around your stiches from an episiotomy or perineal tear.  A fever above 100.4 F (38 C) with or without chills when placing a thermometer under your tongue.  You soak a sanitary pad with blood within 1 hour, or you see blood clots larger than a golf ball.  Bleeding that lasts more than 6 weeks.  Vaginal discharge that smells bad.  Severe pain, cramping or tenderness in your lower belly area.  A need to urinate more frequently (use the toilet more often), more urgently (use the toilet very quickly), or it burns when you urinate.  Nausea and vomiting.  Redness, swelling or pain around a vein in your leg.  Problems breastfeeding or a red or painful area on your breast.  Chest pain and cough or are gasping for air.  Problems coping with sadness,  anxiety, or depression.  If you have any concerns about hurting yourself or the baby, call your provider immediately.   You have questions or concerns after you return home.     Keep your hands clean:  Always wash your hands before touching your perineal area and stitches.  This helps reduce your risk of infection.  If your hands aren't dirty, you may use an alcohol hand-rub to clean your hands. Keep your nails clean and short.

## 2023-08-15 NOTE — PLAN OF CARE
Goal Outcome Evaluation:  Data: VSS .postpartum assessment within normal limits.  Patient eating and drinking normally. Patient able to empty nephrotomy bag  independently and up ambulating. Patient performing self care and able to visit  infant in NICU.  lochia scant and  no blood clots. Lung sound clear.   Action: Patient denied pain except breast pain. Intake and output monitored.  Patient education done( education record). Antibiotic infused and IV saline locked.   Response: Patient participating in infant's care  by visiting infant. Support/ spouse present at bedside and attentive to patient.   Plan: Continue with the plan of cares.

## 2023-08-15 NOTE — PROVIDER NOTIFICATION
Dr. Lozano- (Nephrology fellow )called  back and said it was ok for patient discharge today with current medication-Tacrolimus 4mg BID.

## 2023-08-15 NOTE — PROVIDER NOTIFICATION
08/15/23 1554   Provider Notification   Provider Name/Title DR. Winters   Method of Notification Electronic Page   Request Evaluate-Remote   Notification Reason Other     patient said she doesn't have Mycophenolic acid at home, so can you please order it to be  filled here before she discharge home.

## 2023-08-15 NOTE — PROVIDER NOTIFICATION
08/15/23 8273   Provider Notification   Provider Name/Title Dr. Vianey Corcoran   Method of Notification Electronic Page   Request Evaluate-Remote   Notification Reason Other     Nephrologist. called  back and said it was ok for patient to leave with current dose of Tacrolimus 4mg BID.

## 2023-08-15 NOTE — PROVIDER NOTIFICATION
08/15/23 1245   Provider Notification   Provider Name/Title Dr. sheehan( Nephrologist   Method of Notification Electronic Page   Request Evaluate-Remote   Notification Reason Lab Results     Tacrolimus level was 4.5- lab drawn last night at 22:49pm. OB MD wants to know  if it is  ok to discharge patient.

## 2023-08-15 NOTE — PROGRESS NOTES
Post Partum Progress Note  PPD#3    Subjective:  Mona is doing well this morning and slept well overnight. PNT tube exchange yesterday went well. She developed an itchy rash on her face late last night and is wondering if it could have been related to the clindamycin she got before PNT tube exchange. The itching resolved with benadryl last night and the rash seems to be completely gone after washing her face this morning. She has not had palpitations and is less lightheaded when she gets up to walk around. She would like clearly outlined hold parameters for her BP medication when she is at home. She is tolerating PO. Lochia minimal. Voiding through PNT tube and intermittent spontaneous voiding through the urethra. She is passing flatus and had a BM. She is starting to have some breast tenderness and engorgement. She is bottle feeding infant who is stable in the NICU. Pain control is ok, but she is wondering what else she can take at home besides Tylenol and tight sports bar for treating pain secondary to breast engorgement.     Objective:  Vitals:    08/14/23 2356 08/15/23 0300 08/15/23 0635 08/15/23 0957   BP: 124/86  (!) 120/94 114/69   BP Location: Right arm      Patient Position: Semi-Carrasco's      Cuff Size: Adult Regular   Adult Regular   Pulse: 79  80 100   Resp: 18 18 18 18   Temp: 97.9  F (36.6  C)  97.8  F (36.6  C) 97.7  F (36.5  C)   TempSrc: Oral   Oral   SpO2: 99%      Weight:   73.9 kg (163 lb 0.1 oz)    Height:         General: NAD. A&Ox3.  CV: Regular rate and rhythm, well perfused.   Pulm: Normal respiratory effort, CTAB   Abd: Soft, appropriately tender, non-distended. Fundus is firm and 1 cm below the umbilicus. PNT in place  Ext: trace b/l LE edema. No calf tenderness.    Labs:   Latest Reference Range & Units 08/14/23 12:49   Sodium 136 - 145 mmol/L 140   Potassium 3.4 - 5.3 mmol/L 4.5   Chloride 98 - 107 mmol/L 105   Carbon Dioxide (CO2) 22 - 29 mmol/L 24   Urea Nitrogen 6.0 - 20.0 mg/dL 28.2  (H)   Creatinine 0.51 - 0.95 mg/dL 1.15 (H)   GFR Estimate >60 mL/min/1.73m2 65   Calcium 8.6 - 10.0 mg/dL 9.2   Anion Gap 7 - 15 mmol/L 11   Albumin 3.5 - 5.2 g/dL 3.2 (L)   Protein Total 6.4 - 8.3 g/dL 6.3 (L)   Alkaline Phosphatase 35 - 104 U/L 112 (H)   ALT 0 - 50 U/L 36   AST 0 - 45 U/L 36   Bilirubin Total <=1.2 mg/dL 0.7   Glucose 70 - 99 mg/dL 119 (H)   TSH 0.30 - 4.20 uIU/mL 2.31   (H): Data is abnormally high  (L): Data is abnormally low   Latest Reference Range & Units 23 12:49   WBC 4.0 - 11.0 10e3/uL 9.8   Hemoglobin 11.7 - 15.7 g/dL 9.7 (L)   Hematocrit 35.0 - 47.0 % 29.0 (L)   Platelet Count 150 - 450 10e3/uL 212   (L): Data is abnormally low   Latest Reference Range & Units 23 07:52   INR 0.85 - 1.15  0.91   PTT 22 - 38 Seconds 30   Fibrinogen 170 - 490 mg/dL 624 (H)   (H): Data is abnormally high    Assessment/Plan:  Mona Wagner is a 31 yo G1 now  PPD#3 s/p  at 34w1d after IOL due to worsening renal function in the setting of IgA nephropathy s/p renal transplant (2019) complicated by allograft hydronephosis s/p PNT placement and recurrent UTI. Her pregnancy is otherwise complicated by chronic hypertension (normotensive and not on medications since her transplant) with superimposed preeclampsia without severe features, secondary renal hyperparathyroidism, hypothyroidism, FATOUMATA, history of an upper extremity DVT at her dialysis catheter site and prior bacterial endocarditis with justin echo this pregnancy. She is doing well in the postpartum period and is meeting all discharge goals. PNT tube exchange yesterday was without complication.     Routine postpartum cares  - Encourage routine postpartum goals including ambulation and incentive spirometry  - PNC: Rh +. Rubella non-immune. No MMR vaccine per nephrology as patient immunosuppressed.   - Pain: controlled on oral medications, no NSAIDs.   - Heme: Hgb 10.8>EBL 91mL> 11.1>>9.7  - GI: continue anti-emetics and stool softeners as  needed.  - : PNT tube in place, patient also voiding spontaneously  - Infant: Stable in NICU  - Feeding: bottle feeding. Ice and supportive bra prn for breast engorgement or tenderness as patient not breastfeeding.  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 She has been afebrile throughout admission. No leukocytosis or other clinical evidence of pyelonephritis at present. Possible colonization of previous PNT. Cr overall improving, last 1.15.  - Nephrology following, appreciate recommendations  - Day #12 Ceftazidime q8h - plan to discontinue at discharge  - S/p PNT exchange 8/14, patient has been intermittently voiding spontaneously since delivery.   - Mycophenolic Acid 540mg BID  - CMP daily      Superimposed pre-eclampsia without severe features  Chronic hypertension  Patient with history of hypertension prior to transplant. Has not required antihypertensives since then and was not started on antihypertensives during this pregnancy until this admission. Though patient did have prior admissions in this pregnancy with severe range BPs in the setting of renal dysfunction/allograft hydronephrosis. Patient meets criteria for superimposed pre-eclampsia without SF.  - Continue to monitor blood pressure closely, reviewed signs/symptoms of preeclampsia  - Goal blood pressure < 130/80 mmHg  - Cr improving 1.15 on 8/14, AM pending, other HELLP labs wnl 8/14  - D#5 nifedipine 60 mg BID. BP have been within goal >50% of the time.  - Daily HELLP labs    Hyperkalemia-resolved  - EKG 8/5 with no acute changes.  - Potassium pending today  - Continue to monitor CMP daily     Hypothyroidism  Secondary hyperparathyroidism with hypercalcemia   S/p resection of  parathyroid glands on . Follows with endocrinology.  - Continue levothyroxine 25 mcg QD  - 23 TSH 2.51     History of DVT  L>R Lower Extremity Edema  History of upper extremity DVT in  associated with dialysis catheter. Assessment for inherited thrombophilias including Factor V Leiden and prothrombin gene mutation were negative. APLS testing in this pregnancy notable only for weakly positive cardiolipin IgM Ab. LE Doppler negative for DVT.  - Encouraged ambulation  - No indication for anticoagulation at this time     History of bacterial endocarditis  No acute issues. Last echo on 3/20 with LV hyperkinetic and LVEF >70%, RV function normal. SBE prophylaxis was not indicated.     FATOUMATA  Continues with PTA CPAP      Cholestasis of Pregnancy, resolved.  Bile Acids 47 (8/3/2023). Received Ursodiol while pregnant. AST and ALT wnl today.     Dispo: Will plan for discharge this afternoon pending tacrolimus level as patient is meeting all postpartum goals.    Jenifer Toledo, MS4  ------------                                                                                                          MFM Physician Attestation  I saw this patient with the resident/fellow and agree with the their findings and plan of care as documented in the note.  I personally reviewed her vital signs, medications, labs, pertinent imaging, and fetal monitoring.     [place] findings:   Mona Wagner is a 32 year old  female who is PPD#3 s/p . Her pregnancy was complicated by worsening renal function in the setting of IgA nephropathy s/p renal transplant () complicated by allograft hydronephosis s/p PNT placement and recurrent UTI. She had her PNT replaced yesterday without incident. Tacrolimus level has been within range. In addition, she had superimposed preeclampsia without severe features. Her blood pressures have now been well controlled on Nifedipine 60 mg BID. Otherwise meeting postpartum goals. Will plan for close  follow-up with Nephrology and Maternal-Fetal Medicine upon discharge.    /69 (Cuff Size: Adult Regular)   Pulse 100   Temp 97.7  F (36.5  C) (Oral)   Resp 18   Ht 1.524 m (5')   Wt 73.9 kg (163 lb 0.1 oz)   LMP 12/16/2022   SpO2 99%   Breastfeeding Unknown   BMI 31.83 kg/m       I personally spent a total of 45 minutes, including both face-to-face and non-face-to-face on the date of the encounter, addressing the above diagnosis. Activities performed in this time include chart review, obtaining / reviewing history, performing a medically necessary evaluation, documentation and counseling/care coordination/ordering tests/ordering meds.      Jewell Winters MD  Maternal Fetal Medicine   Date of Service (when I saw the patient): 8/15/2023

## 2023-08-15 NOTE — PROVIDER NOTIFICATION
08/15/23 0008   Provider Notification   Provider Name/Title Dr. Ferrari   Method of Notification Electronic Page   Request Evaluate-Remote   Notification Reason Medication Request;Status Update     PT started to have facial rash, other vitals normal, no SOB or , O2 sats 98%. Pt suspects allergy to new med started this am (clindamycin), can u recommend antihistamine?    Provider called back, ordered to give benadryl for rash. Given benadryl per orem-pt preferred.

## 2023-08-15 NOTE — PLAN OF CARE
Goal Outcome Evaluation:    Vital signs stable. Postpartum assessment WDL. Denies pre eclampsia symptoms. Scant vaginal bleeding. Pain is controlled with Tylenol. Patient has nephrostomy tube attached to bag draining to yellow colored urine. Monitored intake and output and weight was taken. Tolerates food and fluids. Complained of face rash and itchiness at around midnight and was relieved with Benadryl capsule. Breast tenderness noted, using ice packs and advised support bra. Will continue with current plan of care.        Plan of Care Reviewed With: patient

## 2023-08-16 DIAGNOSIS — O16.2 ELEVATED BLOOD PRESSURE AFFECTING PREGNANCY IN SECOND TRIMESTER, ANTEPARTUM: ICD-10-CM

## 2023-08-16 LAB
ATRIAL RATE - MUSE: 73 BPM
DIASTOLIC BLOOD PRESSURE - MUSE: NORMAL MMHG
INTERPRETATION ECG - MUSE: NORMAL
P AXIS - MUSE: 54 DEGREES
PR INTERVAL - MUSE: 150 MS
QRS DURATION - MUSE: 80 MS
QT - MUSE: 376 MS
QTC - MUSE: 414 MS
R AXIS - MUSE: 61 DEGREES
SYSTOLIC BLOOD PRESSURE - MUSE: NORMAL MMHG
T AXIS - MUSE: 33 DEGREES
VENTRICULAR RATE- MUSE: 73 BPM

## 2023-08-16 RX ORDER — NIFEDIPINE 30 MG
30 TABLET, EXTENDED RELEASE ORAL DAILY
Qty: 30 TABLET | Refills: 0 | Status: SHIPPED | OUTPATIENT
Start: 2023-08-16 | End: 2023-09-08

## 2023-08-16 NOTE — PROGRESS NOTES
"Pt calling with questions regarding nifedipine dosage. Pt was administered last dose of nifedipine 60 mg XL prior to hospital discharge on 8/15 (AM). At home her PM BP was 99/50, pt did not take PM dose of nifedipine. This AM her BP was 111/79, pt held AM dose of nifedpine and wonders what to do with medication dosing now. Pt denies feeling symptomatic of low BP, encouraged oral hydration (pt drinking 1 btl at HS and 1 this AM, states she is on track for her goal of 3L per day). Pt with \"very light\" VB, denies any events of clots/saturation. No HA, visual changes. Reviewed w Dr Winters who states that goal BP <130/80, pt not to take further doses of nifedipine unless BP above either.     Plan: pt to continue taking BP twice daily or with any new symptom (I.e., headache, visual changes), hold nifedipine unless BP noted to be above 130/80. At that time she would start a dose of nifedipine 30 mg XL once daily. Reviewed this plan with pt, pt VU that she is not to take new dose unless BP becomes elevated. Pt requests new RX be sent at this time so that she has new dose available to use if needed, ok per Dr. Winters. Rx sent to [facility] per her request.   "

## 2023-08-17 ENCOUNTER — TELEPHONE (OUTPATIENT)
Dept: TRANSPLANT | Facility: CLINIC | Age: 33
End: 2023-08-17

## 2023-08-17 ENCOUNTER — MYC MEDICAL ADVICE (OUTPATIENT)
Dept: TRANSPLANT | Facility: CLINIC | Age: 33
End: 2023-08-17

## 2023-08-17 ENCOUNTER — LAB (OUTPATIENT)
Dept: LAB | Facility: HOSPITAL | Age: 33
End: 2023-08-17
Payer: MEDICARE

## 2023-08-17 ENCOUNTER — OFFICE VISIT (OUTPATIENT)
Dept: TRANSPLANT | Facility: CLINIC | Age: 33
End: 2023-08-17
Attending: INTERNAL MEDICINE
Payer: MEDICARE

## 2023-08-17 ENCOUNTER — PATIENT OUTREACH (OUTPATIENT)
Dept: CARE COORDINATION | Facility: CLINIC | Age: 33
End: 2023-08-17

## 2023-08-17 VITALS
BODY MASS INDEX: 31.23 KG/M2 | SYSTOLIC BLOOD PRESSURE: 119 MMHG | TEMPERATURE: 98.1 F | WEIGHT: 159.9 LBS | HEART RATE: 90 BPM | OXYGEN SATURATION: 98 % | DIASTOLIC BLOOD PRESSURE: 82 MMHG

## 2023-08-17 DIAGNOSIS — E83.42 HYPOMAGNESEMIA: Primary | ICD-10-CM

## 2023-08-17 DIAGNOSIS — Z94.0 KIDNEY REPLACED BY TRANSPLANT: ICD-10-CM

## 2023-08-17 DIAGNOSIS — N18.2 ANEMIA IN STAGE 2 CHRONIC KIDNEY DISEASE: ICD-10-CM

## 2023-08-17 DIAGNOSIS — N18.2 CKD (CHRONIC KIDNEY DISEASE) STAGE 2, GFR 60-89 ML/MIN: ICD-10-CM

## 2023-08-17 DIAGNOSIS — E55.9 VITAMIN D DEFICIENCY: ICD-10-CM

## 2023-08-17 DIAGNOSIS — Z48.298 AFTERCARE FOLLOWING ORGAN TRANSPLANT: ICD-10-CM

## 2023-08-17 DIAGNOSIS — D63.1 ANEMIA IN STAGE 2 CHRONIC KIDNEY DISEASE: ICD-10-CM

## 2023-08-17 DIAGNOSIS — Z94.0 KIDNEY REPLACED BY TRANSPLANT: Primary | ICD-10-CM

## 2023-08-17 DIAGNOSIS — D84.9 IMMUNOSUPPRESSED STATUS (H): ICD-10-CM

## 2023-08-17 DIAGNOSIS — E83.42 HYPOMAGNESEMIA: ICD-10-CM

## 2023-08-17 LAB
ALBUMIN MFR UR ELPH: 52.3 MG/DL
ANION GAP SERPL CALCULATED.3IONS-SCNC: 13 MMOL/L (ref 7–15)
BUN SERPL-MCNC: 27.4 MG/DL (ref 6–20)
CALCIUM SERPL-MCNC: 9.4 MG/DL (ref 8.6–10)
CHLORIDE SERPL-SCNC: 105 MMOL/L (ref 98–107)
CREAT SERPL-MCNC: 1.1 MG/DL (ref 0.51–0.95)
CREAT UR-MCNC: 26.3 MG/DL
DEPRECATED HCO3 PLAS-SCNC: 21 MMOL/L (ref 22–29)
ERYTHROCYTE [DISTWIDTH] IN BLOOD BY AUTOMATED COUNT: 15 % (ref 10–15)
GFR SERPL CREATININE-BSD FRML MDRD: 69 ML/MIN/1.73M2
GLUCOSE SERPL-MCNC: 108 MG/DL (ref 70–99)
HCT VFR BLD AUTO: 32.1 % (ref 35–47)
HGB BLD-MCNC: 10.6 G/DL (ref 11.7–15.7)
MAGNESIUM SERPL-MCNC: 1.5 MG/DL (ref 1.7–2.3)
MCH RBC QN AUTO: 31.4 PG (ref 26.5–33)
MCHC RBC AUTO-ENTMCNC: 33 G/DL (ref 31.5–36.5)
MCV RBC AUTO: 95 FL (ref 78–100)
PLATELET # BLD AUTO: 346 10E3/UL (ref 150–450)
POTASSIUM SERPL-SCNC: 4.2 MMOL/L (ref 3.4–5.3)
PROT/CREAT 24H UR: 1.99 MG/MG CR (ref 0–0.2)
RBC # BLD AUTO: 3.38 10E6/UL (ref 3.8–5.2)
SODIUM SERPL-SCNC: 139 MMOL/L (ref 136–145)
TACROLIMUS BLD-MCNC: 4 UG/L (ref 5–15)
TME LAST DOSE: ABNORMAL H
TME LAST DOSE: ABNORMAL H
WBC # BLD AUTO: 7.2 10E3/UL (ref 4–11)

## 2023-08-17 PROCEDURE — 84156 ASSAY OF PROTEIN URINE: CPT

## 2023-08-17 PROCEDURE — G0463 HOSPITAL OUTPT CLINIC VISIT: HCPCS | Performed by: INTERNAL MEDICINE

## 2023-08-17 PROCEDURE — 80197 ASSAY OF TACROLIMUS: CPT

## 2023-08-17 PROCEDURE — 80048 BASIC METABOLIC PNL TOTAL CA: CPT

## 2023-08-17 PROCEDURE — 99214 OFFICE O/P EST MOD 30 MIN: CPT | Performed by: INTERNAL MEDICINE

## 2023-08-17 PROCEDURE — 83735 ASSAY OF MAGNESIUM: CPT

## 2023-08-17 PROCEDURE — 85027 COMPLETE CBC AUTOMATED: CPT

## 2023-08-17 PROCEDURE — 36415 COLL VENOUS BLD VENIPUNCTURE: CPT

## 2023-08-17 ASSESSMENT — PAIN SCALES - GENERAL: PAINLEVEL: NO PAIN (0)

## 2023-08-17 NOTE — PROGRESS NOTES
Anemia Management Note  SUBJECTIVE/OBJECTIVE:  Referred by Dr. Gelacio Mcintyre on 2023  Primary Diagnosis: Anemia in Chronic Kidney Disease (N18.3, D63.1)   3a  Secondary Diagnosis:  Chronic Kidney Disease, Stage 3 (N18.3)  3a  Hgb goal range:  9-10  Kidney Tx: 8/3/2019  Epo/Darbo: Aranesp 40mcg every 14 days for Hgb <10.0. In Clinic.   Iron regimen:  Prenatal vit with Iron.   *Elevated Ferritin levels.   Labs : 2024  Recent CHARLINE use, transfusion, IV iron: Aranesp .  RX/TX plans : 2024     *Prefers [facility]      Hx of DVT (), High Risk Pregnancy (2nd trimester).     Contact: OK to speak with Stephan Wagner (father) and Savanah Wagner (sister) regarding Medical Information per consent to communicate dated 4/3/2020.        Latest Ref Rng & Units 2023     7:36 AM 8/10/2023     9:34 AM 2023     7:45 AM 2023     8:49 AM 2023     6:33 AM 2023    12:49 PM 2023    10:04 AM   Anemia   Hemoglobin 11.7 - 15.7 g/dL 10.3  10.5  10.8  11.1  9.4  9.7  10.6      BP Readings from Last 3 Encounters:   08/15/23 111/68   23 129/82   23 131/87     Wt Readings from Last 2 Encounters:   08/15/23 74.1 kg (163 lb 5.8 oz)   23 79.3 kg (174 lb 14.4 oz)           ASSESSMENT:  Hgb:Above goal - recommend hold dose  TSat: at goal >30% Ferritin: Elevated (>1000ng/mL)    PLAN:  Hold Aranesp and RTC for hgb then aranesp if needed in 2 week(s)    Orders needed to be renewed (for next follow-up date) in EPIC: None    Iron labs due:  End of Aug 2023    Plan discussed with:  No call, chart review       NEXT FOLLOW-UP DATE:  23 1100 appt    Manjula Mckinnon RN   Anemia Services  [facility]  [email or web address]   Office : [phone]  Fax: [phone]

## 2023-08-17 NOTE — LETTER
8/17/2023         RE: Mona Wagner  [address]  [facility] [address]        Dear Colleague,    Thank you for referring your patient, Mona Wagner, to the [facility]. Please see a copy of my visit note below.    TRANSPLANT NEPHROLOGY CHRONIC POST TRANSPLANT VISIT    Assessment & Plan  # DDKT: Trend down in creatinine after recent mild CAMERON felt due to preeclampsia.  Patient has now delivered her baby.  Still some proteinuria, which may take time to resolve.  If no improvement over the next month or so, would consider a kidney transplant biopsy.   - Baseline Creatinine:  ~ 0.8-1.1   - Proteinuria: Moderate (1-3 grams)   - Date DSA Last Checked: Sep/2022      Latest DSA: No cPRA: 25%   - BK Viremia: No   - Kidney Tx Biopsy: Sep 17, 2019; Result: No diagnostic evidence of acute rejection.    # Immunosuppression: Tacrolimus immediate release (goal 4-6) and Mycophenolic acid (dose 540 mg every 12 hours)   - Continue with intensive monitoring of immunosuppression for efficacy and toxicity.   - Changes: Not at this time; Recently changed off azathioprine back to mycophenolic acid following pregnancy.    # Infection Prophylaxis:   Last CD4 Level: 201 (Jul/2020)  - PJP: None    # Blood Pressure: Controlled;  Goal BP: > 100, but < 130 systolic   - Changes: Not at this time; Patient was on Florinef prior to pregnancy.  Would encourage good hydration.    # Anemia in Chronic Renal Disease: Hgb: Stable      CHARLINE: No   - Iron studies: Replete    # Mineral Bone Disorder: Patient is s/p parathyroidectomy 4/2023 with 3.5 glands removed.  - Tertiary renal hyperparathyroidism; PTH level:  Low (<15 pg/ml)         On treatment: None; Calcitriol stopped with high normal serum calcium level and low PTH.  - Vitamin D; level: Low        On supplement: Yes  - Calcium; level: Normal        On supplement: Yes    # Electrolytes:   - Potassium; level: Normal        On supplement: No  - Magnesium; level: Not checked  recently        On supplement: Yes  - Bicarbonate; level: Stable low        On supplement: Yes    # Allograft Ureteral Stenosis/Hydronephrosis: Patient with moderate hydronephrosis of kidney transplant and developed CAMERON.  She underwent PNT 6/9/23 with repeat kidney transplant ultrasound 6/11 still showing moderate hydronephrosis suggesting this may be a functional hydronephrosis from pregnancy.  However, patient does report resolution of edema and dyspnea, as well as apparent post-obstructive diuresis.  She has a previous h/o ureteral stenosis with ureteral stent removed 2/2021.  Previous kidney transplant ultrasound 7/2021 also showed moderate hydronephrosis unchanged with voiding. Hydronephrosis was unchanged on 7/6/22 abdominal CT.  Now s/p PNT exchange 8/14/23.    # Pregnancy/Preeclampsia: Patient delivered at 32 weeks 6 days with some preeclampsia symptoms.  She still has proteinuria, but otherwise, other symptoms have resolved.    # GERD: Controlled on H2 blocler.    # Hyperprolactinemia: Normal prolactin level with last check.  Felt secondary to hypothyroidism versus kidney failure, or less likely now a pituitary microadenoma.  Followed by Endocrinology.    # Right Axillary Lymph Node: Patient was referred to general surgery for excisional biopsy in 8/2022, but decision was made to watch the node.  Node appears to be stable for the last year at least.   - Would consider excisional biopsy post partum.    # Skin Cancer Risk:    - Discussed sun protection and recommend regular follow up with Dermatology.    # Medical Compliance: Yes    # Health Maintenance and Vaccination Review: Not Reviewed    # Transplant History:  Etiology of Kidney Failure: Unknown etiology (no kidney biopsy)  Tx: DDKT  Transplant: 8/3/2019 (Kidney)  Significant changes in immunosuppression:  Changed from mycophenolate to azathioprine for pregnancy.  Significant transplant-related complications: Transplant ureteral stenosis    Transplant  Office Phone Number: [phone]    Assessment and plan was discussed with the patient and she voiced her understanding and agreement.    Return visit: Return for previously scheduled visit.    Nakul Hill MD    Chief Complaint  Ms. Wagner is a 32 year old here for kidney transplant and immunosuppression management.    History of Present Illness   Ms. Wagner reports feeling good overall with some medical complaints.  She was recently admitted for  contractions and some preeclampsia symptoms and ended up giving birth to a baby boy at 32 weeks 6 days gestation.  Her baby remains in the NICU, but reportedly doing better.  She is now at home.  She is not, nor planning on, doing breast feeding.  Patient's immunosuppression was changed from azathioprine back to mycophenolic acid following delivery.    Her energy level is better, but not quite back to normal as she is still recovering from her pregnancy.  She is active, but really no exercise yet.  Denies any chest pain or shortness of breath with exertion, but does note that he heart rate increases when she is up and moving around.  No leg swelling.    Appetite is okay and her weight is now down ~ 20 lbs.  Slight nausea, but no vomiting.  Occasional loose stools.  No heartburn symptoms.  No fever, sweats or chills.  No night sweats.    Patient still has her PNT, which was exchanged a couple of days ago.  She notes almost all the urine is coming into the PNT and only a little urine via her bladder when she gets up in the morning.    Home BP:  110/80s with no lightheadedness.    Problem List  Patient Active Problem List   Diagnosis    DVT of upper extremity (deep vein thrombosis) (H)    Seizure disorder (H)    Acquired hypothyroidism    Hypomagnesemia    Aftercare following organ transplant    Immunosuppressed status (H)    Kidney replaced by transplant    Anxiety    Vitamin D deficiency    CKD (chronic kidney disease) stage 2, GFR 60-89 ml/min    Bicornate  uterus    Hydronephrosis of kidney transplant    Ureteral stenosis    Anemia in chronic renal disease       Allergies  Allergies   Allergen Reactions    Contrast Dye Rash     CT contrast allergy 12/14/19 rash over eyes. Need to have pre medication before a CT WITH CONTRAST     Diatrizoate Rash     CT contrast allergy 12/14/19 rash over eyes. Need to have pre medication before a CT WITH CONTRAST     Lisinopril Swelling     angioedema    Nitrous Oxide Other (See Comments)     Sense of doom    Chlorhexidine Rash     Rash at site    Sulfa Antibiotics Rash     Muscle stiffness of neck    Dapsone      Methemoglobinemia    Furosemide Other (See Comments)     Skin flushing    Metrogel [Metronidazole]      Hives diffusely on body after vaginal administration.    Azithromycin Dizziness and Rash    Cefuroxime Rash       Medications  Current Outpatient Medications   Medication Sig    acetaminophen (TYLENOL) 325 MG tablet Take 2 tablets (650 mg) by mouth every 4 hours as needed for mild pain    calcium carbonate 1250 MG/5ML SUSP suspension Take 2.5 mLs (625 mg) by mouth daily    cyanocobalamin (VITAMIN B-12) 1000 MCG tablet Take 1 tablet (1,000 mcg) by mouth daily (Patient not taking: Reported on 8/18/2023)    hydrocortisone 2.5 % cream Apply topically 2 times daily    lactase (LACTAID) 3000 UNIT tablet Take 3,000 Units by mouth 3 times daily (with meals)    levothyroxine (SYNTHROID/LEVOTHROID) 25 MCG tablet Take 1 tablet (25 mcg) by mouth daily    magnesium oxide (MAG-OX) 400 MG tablet Take 800 mg by mouth every morning    NIFEdipine ER (ADALAT CC) 30 MG 24 hr tablet Take 1 tablet (30 mg) by mouth daily Start taking one tablet if blood pressure greater than 130/80.    patiromer (VELTASSA) 8.4 g packet Take 8.4 g by mouth daily as needed (as directed by your transplant team, for potassium = or > 5.5)    polyethylene glycol (MIRALAX) 17 GM/Dose powder Take 17 g (1 capful) by mouth daily as needed for constipation    sodium  bicarbonate 650 MG tablet Take 2 tablets (1,300 mg) by mouth 3 times daily for 90 days    vitamin D3 (CHOLECALCIFEROL) 50 mcg (2000 units) tablet Take 1 tablet (50 mcg) by mouth daily for 90 days (Patient taking differently: Take 1 tablet by mouth every morning)    fluticasone (FLONASE) 50 MCG/ACT nasal spray Spray 1 spray into both nostrils daily    mycophenolic acid (GENERIC EQUIVALENT) 180 MG EC tablet Take 3 tablets (540 mg) by mouth 2 times daily    tacrolimus (GENERIC EQUIVALENT) 1 MG capsule Take 5 capsules (5 mg) by mouth 2 times daily    vancomycin (VANCOCIN) 125 MG capsule Take 1 capsule (125 mg) by mouth 4 times daily for 14 days     No current facility-administered medications for this visit.     Medications Discontinued During This Encounter   Medication Reason    azaTHIOprine (IMURAN) 50 MG tablet        Physical Exam  Vital Signs: /82 (BP Location: Right arm, Patient Position: Sitting, Cuff Size: Adult Regular)   Pulse 90   Temp 98.1  F (36.7  C) (Oral)   Wt 72.5 kg (159 lb 14.4 oz)   LMP 12/16/2022   SpO2 98%   BMI 31.23 kg/m      GENERAL APPEARANCE: alert and no distress  HENT: mouth without ulcers or lesions  LYMPHATICS: no cervical or supraclavicular nodes; small ~ 0.5 cm right axillary lymph node, non tender  RESP: lungs clear to auscultation - no rales, rhonchi or wheezes  CV: regular rhythm, normal rate, no rub, 2/6 systolic murmur  EDEMA: no LE edema bilaterally  ABDOMEN: soft, nondistended, nontender, bowel sounds normal, overweight  MS: extremities normal - no gross deformities noted, no evidence of inflammation in joints, no muscle tenderness  SKIN: no rash  TX KIDNEY: normal  DIALYSIS ACCESS:  LUE AV fistula with good thrill    Data        Latest Ref Rng & Units 8/23/2023    10:07 AM 8/17/2023    10:04 AM 8/14/2023    12:49 PM   Renal   Sodium 136 - 145 mmol/L 141  139  140    K 3.4 - 5.3 mmol/L 4.2  4.2  4.5    Cl 98 - 107 mmol/L 105  105  105    Cl (external) 98 - 107 mmol/L  105  105  105    CO2 22 - 29 mmol/L 24  21  24    Urea Nitrogen 6.0 - 20.0 mg/dL 26.7  27.4  28.2    Creatinine 0.51 - 0.95 mg/dL 1.01  1.10  1.15    Glucose 70 - 99 mg/dL 96  108  119    Calcium 8.6 - 10.0 mg/dL 9.4  9.4  9.2    Magnesium 1.7 - 2.3 mg/dL 1.8  1.5           Latest Ref Rng & Units 7/13/2023    10:15 AM 6/15/2023    10:09 AM 6/12/2023     2:35 AM   Bone Health   Phosphorus 2.5 - 4.5 mg/dL 3.3  3.6  2.6          Latest Ref Rng & Units 8/23/2023    10:07 AM 8/17/2023    10:04 AM 8/14/2023    12:49 PM   Heme   WBC 4.0 - 11.0 10e3/uL 8.5  7.2  9.8    Hgb 11.7 - 15.7 g/dL 11.2  10.6  9.7    Plt 150 - 450 10e3/uL 354  346  212          Latest Ref Rng & Units 8/14/2023    12:49 PM 8/13/2023     6:33 AM 8/12/2023     8:49 AM   Liver   AP 35 - 104 U/L 112  104  132    TBili <=1.2 mg/dL 0.7  0.8  1.1    ALT 0 - 50 U/L 36  30  38    AST 0 - 45 U/L 36  29  38    Tot Protein 6.4 - 8.3 g/dL 6.3  5.9  6.6    Albumin 3.5 - 5.2 g/dL 3.2  2.9  3.2          Latest Ref Rng & Units 2/6/2023    10:23 AM 6/22/2022     6:39 AM 8/4/2020     9:05 AM   Pancreas   A1C <5.7 % 5.2   5.5    Lipase 0 - 52 U/L  39           Latest Ref Rng & Units 7/31/2023    10:17 AM 5/22/2023    10:23 AM 5/22/2023    10:22 AM   Iron studies   Iron 37 - 145 ug/dL 94   80    Iron Sat Index 15 - 46 % 35   41    Ferritin 6 - 175 ng/mL 1,525  1,618           Latest Ref Rng & Units 7/7/2021     9:20 AM 6/2/2021     9:25 AM 5/3/2021     9:02 AM   [facility] Txp Virology   BK Spec  Plasma  Plasma  Plasma    BK Res BKNEG^BK Virus DNA Not Detected copies/mL BK Virus DNA Not Detected  BK Virus DNA Not Detected  BK Virus DNA Not Detected    BK Log <2.7 Log copies/mL Not Calculated  Not Calculated  Not Calculated        Recent Labs   Lab Test 08/14/23  0752 08/14/23  2249 08/17/23  1004 08/23/23  1007   DOSTAC 8/13/2023  --  8/16/2023 8/22/2023   TACROL 4.8* 4.5* 4.0* 3.6*     Recent Labs   Lab Test 08/16/19  0807 09/03/19  0944   DOSMPA Not Provided 0840pm   MPACID  [id number] [id number]   MPAG 149.2* 52.8         Nakul Hill MD

## 2023-08-17 NOTE — NURSING NOTE
Chief Complaint   Patient presents with    RECHECK   /82 (BP Location: Right arm, Patient Position: Sitting, Cuff Size: Adult Regular)   Pulse 90   Temp 98.1  F (36.7  C) (Oral)   Wt 72.5 kg (159 lb 14.4 oz)   LMP 12/16/2022   SpO2 98%   BMI 31.23 kg/m  Angle Ojeda on 8/17/2023 at 2:14 PM

## 2023-08-17 NOTE — LETTER
8/17/2023      RE: Mona Wagner  [address]  [facility] [address]       TRANSPLANT NEPHROLOGY CHRONIC POST TRANSPLANT VISIT    Assessment & Plan  # DDKT: Trend down in creatinine after recent mild CAMERON felt due to preeclampsia.  Patient has now delivered her baby.  Still some proteinuria, which may take time to resolve.  If no improvement over the next month or so, would consider a kidney transplant biopsy.   - Baseline Creatinine:  ~ 0.8-1.1   - Proteinuria: Moderate (1-3 grams)   - Date DSA Last Checked: Sep/2022      Latest DSA: No cPRA: 25%   - BK Viremia: No   - Kidney Tx Biopsy: Sep 17, 2019; Result: No diagnostic evidence of acute rejection.    # Immunosuppression: Tacrolimus immediate release (goal 4-6) and Mycophenolic acid (dose 540 mg every 12 hours)   - Continue with intensive monitoring of immunosuppression for efficacy and toxicity.   - Changes: Not at this time; Recently changed off azathioprine back to mycophenolic acid following pregnancy.    # Infection Prophylaxis:   Last CD4 Level: 201 (Jul/2020)  - PJP: None    # Blood Pressure: Controlled;  Goal BP: > 100, but < 130 systolic   - Changes: Not at this time; Patient was on Florinef prior to pregnancy.  Would encourage good hydration.    # Anemia in Chronic Renal Disease: Hgb: Stable      CHARLINE: No   - Iron studies: Replete    # Mineral Bone Disorder: Patient is s/p parathyroidectomy 4/2023 with 3.5 glands removed.  - Tertiary renal hyperparathyroidism; PTH level:  Low (<15 pg/ml)         On treatment: None; Calcitriol stopped with high normal serum calcium level and low PTH.  - Vitamin D; level: Low        On supplement: Yes  - Calcium; level: Normal        On supplement: Yes    # Electrolytes:   - Potassium; level: Normal        On supplement: No  - Magnesium; level: Not checked recently        On supplement: Yes  - Bicarbonate; level: Stable low        On supplement: Yes    # Allograft Ureteral Stenosis/Hydronephrosis: Patient with moderate  hydronephrosis of kidney transplant and developed CAMERON.  She underwent PNT 6/9/23 with repeat kidney transplant ultrasound 6/11 still showing moderate hydronephrosis suggesting this may be a functional hydronephrosis from pregnancy.  However, patient does report resolution of edema and dyspnea, as well as apparent post-obstructive diuresis.  She has a previous h/o ureteral stenosis with ureteral stent removed 2/2021.  Previous kidney transplant ultrasound 7/2021 also showed moderate hydronephrosis unchanged with voiding. Hydronephrosis was unchanged on 7/6/22 abdominal CT.  Now s/p PNT exchange 8/14/23.    # Pregnancy/Preeclampsia: Patient delivered at 32 weeks 6 days with some preeclampsia symptoms.  She still has proteinuria, but otherwise, other symptoms have resolved.    # GERD: Controlled on H2 blocler.    # Hyperprolactinemia: Normal prolactin level with last check.  Felt secondary to hypothyroidism versus kidney failure, or less likely now a pituitary microadenoma.  Followed by Endocrinology.    # Right Axillary Lymph Node: Patient was referred to general surgery for excisional biopsy in 8/2022, but decision was made to watch the node.  Node appears to be stable for the last year at least.   - Would consider excisional biopsy post partum.    # Skin Cancer Risk:    - Discussed sun protection and recommend regular follow up with Dermatology.    # Medical Compliance: Yes    # Health Maintenance and Vaccination Review: Not Reviewed    # Transplant History:  Etiology of Kidney Failure: Unknown etiology (no kidney biopsy)  Tx: DDKT  Transplant: 8/3/2019 (Kidney)  Significant changes in immunosuppression:  Changed from mycophenolate to azathioprine for pregnancy.  Significant transplant-related complications: Transplant ureteral stenosis    Transplant Office Phone Number: [phone]    Assessment and plan was discussed with the patient and she voiced her understanding and agreement.    Return visit: Return for  previously scheduled visit.    Nakul Hill MD    Chief Complaint  Ms. Wagner is a 32 year old here for kidney transplant and immunosuppression management.    History of Present Illness   Ms. Wagner reports feeling good overall with some medical complaints.  She was recently admitted for  contractions and some preeclampsia symptoms and ended up giving birth to a baby boy at 32 weeks 6 days gestation.  Her baby remains in the NICU, but reportedly doing better.  She is now at home.  She is not, nor planning on, doing breast feeding.  Patient's immunosuppression was changed from azathioprine back to mycophenolic acid following delivery.    Her energy level is better, but not quite back to normal as she is still recovering from her pregnancy.  She is active, but really no exercise yet.  Denies any chest pain or shortness of breath with exertion, but does note that he heart rate increases when she is up and moving around.  No leg swelling.    Appetite is okay and her weight is now down ~ 20 lbs.  Slight nausea, but no vomiting.  Occasional loose stools.  No heartburn symptoms.  No fever, sweats or chills.  No night sweats.    Patient still has her PNT, which was exchanged a couple of days ago.  She notes almost all the urine is coming into the PNT and only a little urine via her bladder when she gets up in the morning.    Home BP:  110/80s with no lightheadedness.    Problem List  Patient Active Problem List   Diagnosis     DVT of upper extremity (deep vein thrombosis) (H)     Seizure disorder (H)     Acquired hypothyroidism     Hypomagnesemia     Aftercare following organ transplant     Immunosuppressed status (H)     Kidney replaced by transplant     Anxiety     Vitamin D deficiency     CKD (chronic kidney disease) stage 2, GFR 60-89 ml/min     Bicornate uterus     Hydronephrosis of kidney transplant     Ureteral stenosis     Anemia in chronic renal disease       Allergies  Allergies   Allergen Reactions      Contrast Dye Rash     CT contrast allergy 12/14/19 rash over eyes. Need to have pre medication before a CT WITH CONTRAST      Diatrizoate Rash     CT contrast allergy 12/14/19 rash over eyes. Need to have pre medication before a CT WITH CONTRAST      Lisinopril Swelling     angioedema     Nitrous Oxide Other (See Comments)     Sense of doom     Chlorhexidine Rash     Rash at site     Sulfa Antibiotics Rash     Muscle stiffness of neck     Dapsone      Methemoglobinemia     Furosemide Other (See Comments)     Skin flushing     Metrogel [Metronidazole]      Hives diffusely on body after vaginal administration.     Azithromycin Dizziness and Rash     Cefuroxime Rash       Medications  Current Outpatient Medications   Medication Sig     acetaminophen (TYLENOL) 325 MG tablet Take 2 tablets (650 mg) by mouth every 4 hours as needed for mild pain     calcium carbonate 1250 MG/5ML SUSP suspension Take 2.5 mLs (625 mg) by mouth daily     cyanocobalamin (VITAMIN B-12) 1000 MCG tablet Take 1 tablet (1,000 mcg) by mouth daily (Patient not taking: Reported on 8/18/2023)     hydrocortisone 2.5 % cream Apply topically 2 times daily     lactase (LACTAID) 3000 UNIT tablet Take 3,000 Units by mouth 3 times daily (with meals)     levothyroxine (SYNTHROID/LEVOTHROID) 25 MCG tablet Take 1 tablet (25 mcg) by mouth daily     magnesium oxide (MAG-OX) 400 MG tablet Take 800 mg by mouth every morning     NIFEdipine ER (ADALAT CC) 30 MG 24 hr tablet Take 1 tablet (30 mg) by mouth daily Start taking one tablet if blood pressure greater than 130/80.     patiromer (VELTASSA) 8.4 g packet Take 8.4 g by mouth daily as needed (as directed by your transplant team, for potassium = or > 5.5)     polyethylene glycol (MIRALAX) 17 GM/Dose powder Take 17 g (1 capful) by mouth daily as needed for constipation     sodium bicarbonate 650 MG tablet Take 2 tablets (1,300 mg) by mouth 3 times daily for 90 days     vitamin D3 (CHOLECALCIFEROL) 50 mcg (2000  units) tablet Take 1 tablet (50 mcg) by mouth daily for 90 days (Patient taking differently: Take 1 tablet by mouth every morning)     fluticasone (FLONASE) 50 MCG/ACT nasal spray Spray 1 spray into both nostrils daily     mycophenolic acid (GENERIC EQUIVALENT) 180 MG EC tablet Take 3 tablets (540 mg) by mouth 2 times daily     tacrolimus (GENERIC EQUIVALENT) 1 MG capsule Take 5 capsules (5 mg) by mouth 2 times daily     vancomycin (VANCOCIN) 125 MG capsule Take 1 capsule (125 mg) by mouth 4 times daily for 14 days     No current facility-administered medications for this visit.     Medications Discontinued During This Encounter   Medication Reason     azaTHIOprine (IMURAN) 50 MG tablet        Physical Exam  Vital Signs: /82 (BP Location: Right arm, Patient Position: Sitting, Cuff Size: Adult Regular)   Pulse 90   Temp 98.1  F (36.7  C) (Oral)   Wt 72.5 kg (159 lb 14.4 oz)   LMP 12/16/2022   SpO2 98%   BMI 31.23 kg/m      GENERAL APPEARANCE: alert and no distress  HENT: mouth without ulcers or lesions  LYMPHATICS: no cervical or supraclavicular nodes; small ~ 0.5 cm right axillary lymph node, non tender  RESP: lungs clear to auscultation - no rales, rhonchi or wheezes  CV: regular rhythm, normal rate, no rub, 2/6 systolic murmur  EDEMA: no LE edema bilaterally  ABDOMEN: soft, nondistended, nontender, bowel sounds normal, overweight  MS: extremities normal - no gross deformities noted, no evidence of inflammation in joints, no muscle tenderness  SKIN: no rash  TX KIDNEY: normal  DIALYSIS ACCESS:  LUE AV fistula with good thrill    Data        Latest Ref Rng & Units 8/23/2023    10:07 AM 8/17/2023    10:04 AM 8/14/2023    12:49 PM   Renal   Sodium 136 - 145 mmol/L 141  139  140    K 3.4 - 5.3 mmol/L 4.2  4.2  4.5    Cl 98 - 107 mmol/L 105  105  105    Cl (external) 98 - 107 mmol/L 105  105  105    CO2 22 - 29 mmol/L 24  21  24    Urea Nitrogen 6.0 - 20.0 mg/dL 26.7  27.4  28.2    Creatinine 0.51 - 0.95  mg/dL 1.01  1.10  1.15    Glucose 70 - 99 mg/dL 96  108  119    Calcium 8.6 - 10.0 mg/dL 9.4  9.4  9.2    Magnesium 1.7 - 2.3 mg/dL 1.8  1.5           Latest Ref Rng & Units 7/13/2023    10:15 AM 6/15/2023    10:09 AM 6/12/2023     2:35 AM   Bone Health   Phosphorus 2.5 - 4.5 mg/dL 3.3  3.6  2.6          Latest Ref Rng & Units 8/23/2023    10:07 AM 8/17/2023    10:04 AM 8/14/2023    12:49 PM   Heme   WBC 4.0 - 11.0 10e3/uL 8.5  7.2  9.8    Hgb 11.7 - 15.7 g/dL 11.2  10.6  9.7    Plt 150 - 450 10e3/uL 354  346  212          Latest Ref Rng & Units 8/14/2023    12:49 PM 8/13/2023     6:33 AM 8/12/2023     8:49 AM   Liver   AP 35 - 104 U/L 112  104  132    TBili <=1.2 mg/dL 0.7  0.8  1.1    ALT 0 - 50 U/L 36  30  38    AST 0 - 45 U/L 36  29  38    Tot Protein 6.4 - 8.3 g/dL 6.3  5.9  6.6    Albumin 3.5 - 5.2 g/dL 3.2  2.9  3.2          Latest Ref Rng & Units 2/6/2023    10:23 AM 6/22/2022     6:39 AM 8/4/2020     9:05 AM   Pancreas   A1C <5.7 % 5.2   5.5    Lipase 0 - 52 U/L  39           Latest Ref Rng & Units 7/31/2023    10:17 AM 5/22/2023    10:23 AM 5/22/2023    10:22 AM   Iron studies   Iron 37 - 145 ug/dL 94   80    Iron Sat Index 15 - 46 % 35   41    Ferritin 6 - 175 ng/mL 1,525  1,618           Latest Ref Rng & Units 7/7/2021     9:20 AM 6/2/2021     9:25 AM 5/3/2021     9:02 AM   [facility] Txp Virology   BK Spec  Plasma  Plasma  Plasma    BK Res BKNEG^BK Virus DNA Not Detected copies/mL BK Virus DNA Not Detected  BK Virus DNA Not Detected  BK Virus DNA Not Detected    BK Log <2.7 Log copies/mL Not Calculated  Not Calculated  Not Calculated        Recent Labs   Lab Test 08/14/23  0752 08/14/23  2249 08/17/23  1004 08/23/23  1007   DOSTAC 8/13/2023  --  8/16/2023 8/22/2023   TACROL 4.8* 4.5* 4.0* 3.6*     Recent Labs   Lab Test 08/16/19  0807 09/03/19  0944   DOSMPA Not Provided 0840pm   MPACID 1.92 3.39   MPAG 149.2* 52.8       Nakul Hill MD

## 2023-08-17 NOTE — PATIENT INSTRUCTIONS
Patient Recommendations:  - Weekly transplant labs with urine protein.    Transplant Patient Information  Your Post Transplant Coordinator is: Jennifer Trejo  For non urgent items, we encourage you to contact your coordinator/care team online via [employer]  You and your care team can also contact your transplant coordinator Monday - Friday, 8am - 5pm at [phone] (Option 2 to reach the coordinator or Option 4 to schedule an appointment).  After hours for urgent matters, please call [facility] at [phone].

## 2023-08-17 NOTE — TELEPHONE ENCOUNTER
ISSUE: elevated proteinuria, UPC: 1.99 today 8/17    PLAN:  Gelacio Mcnityre MD Sveiven, Sara, RN  If repeat UPCR on next check is this high will stop nifedipine and switch to ARB as she is not breast feeding.      Nakul Hill MD Sveiven, Sara, RN; Gelacio Mcintyre MD Sara,    Saw patient today for hospital follow up.  Her baby is doing okay, but still in the NICU.  She is NOT going to breast feed or pump (as you probably know) so she is back on mycophenolic acid.    Creatinine today is back to her original baseline at ~ 1.1.  However, she does have a bit more proteinuria of ~ 2 grams.  No edema.  Normal BP.  May be early/residual preeclampsia and will have to follow.    Please have her due weekly transplant labs with [facility].    In addition, she had her PNT changed out before she was discharged.  IR said they wanted to see her back in 4 weeks to decide if she still needs the PNT.  She hasn't heard anything about that appointment, but I said to see if she hears anything by next week and if not, to message you.    She has a video visit with me in September and one with Dr. Mcintyre in person a week after that.  I told her she could cancel the visit with me if she is doing well, but could wait and see.    Nakul Orozco MD Sveiven, Sara, RN  Low magnesium level and recommend patient stay on oral magnesium supplement and would recheck again with labs next week.  If it remains low, would consider increasing supplement dose.    OUTCOME:  See my chart from patient.  Patient agrees to continue with magnesium supplement.  Will repeat labs this week.  Orders placed.    Jennifer Trejo RN   Transplant Coordinator  [phone]

## 2023-08-18 ENCOUNTER — ALLIED HEALTH/NURSE VISIT (OUTPATIENT)
Dept: MATERNAL FETAL MEDICINE | Facility: CLINIC | Age: 33
End: 2023-08-18
Attending: OBSTETRICS & GYNECOLOGY
Payer: MEDICARE

## 2023-08-18 VITALS
WEIGHT: 159.1 LBS | BODY MASS INDEX: 31.07 KG/M2 | RESPIRATION RATE: 16 BRPM | DIASTOLIC BLOOD PRESSURE: 80 MMHG | OXYGEN SATURATION: 97 % | HEART RATE: 89 BPM | SYSTOLIC BLOOD PRESSURE: 116 MMHG

## 2023-08-18 DIAGNOSIS — Z94.0 KIDNEY REPLACED BY TRANSPLANT: ICD-10-CM

## 2023-08-18 DIAGNOSIS — N18.30 STAGE 3 CHRONIC KIDNEY DISEASE, UNSPECIFIED WHETHER STAGE 3A OR 3B CKD (H): ICD-10-CM

## 2023-08-18 DIAGNOSIS — N18.31 STAGE 3A CHRONIC KIDNEY DISEASE (H): ICD-10-CM

## 2023-08-18 RX ORDER — MYCOPHENOLIC ACID 180 MG/1
540 TABLET, DELAYED RELEASE ORAL 2 TIMES DAILY
Qty: 180 TABLET | Refills: 11 | Status: SHIPPED | OUTPATIENT
Start: 2023-08-18 | End: 2024-01-15

## 2023-08-18 NOTE — NURSING NOTE
Mona, now 6 days PP s/p vaginal delivery, presents to [facility] for BP check. Pt has been checking BP 2x/day morning and evening. Reports BP in am 110s/70s and evening 130/80. No symptoms of HTN, preeclampsia, or hypotension. Pt saw nephrology on 8/17 who recommended she stay on nifedipine. Dr. Winters notified. Pt scheduled 6 week PPV.    Delicia Alba RN

## 2023-08-23 ENCOUNTER — LAB (OUTPATIENT)
Dept: LAB | Facility: CLINIC | Age: 33
End: 2023-08-23
Payer: MEDICARE

## 2023-08-23 DIAGNOSIS — R19.7 DIARRHEA, UNSPECIFIED TYPE: ICD-10-CM

## 2023-08-23 DIAGNOSIS — Z94.0 KIDNEY REPLACED BY TRANSPLANT: Primary | ICD-10-CM

## 2023-08-23 DIAGNOSIS — Z94.0 KIDNEY REPLACED BY TRANSPLANT: ICD-10-CM

## 2023-08-23 LAB
ALBUMIN MFR UR ELPH: 18 MG/DL
ANION GAP SERPL CALCULATED.3IONS-SCNC: 12 MMOL/L (ref 7–15)
BUN SERPL-MCNC: 26.7 MG/DL (ref 6–20)
CALCIUM SERPL-MCNC: 9.4 MG/DL (ref 8.6–10)
CHLORIDE SERPL-SCNC: 105 MMOL/L (ref 98–107)
CREAT SERPL-MCNC: 1.01 MG/DL (ref 0.51–0.95)
CREAT UR-MCNC: 20.2 MG/DL
DEPRECATED HCO3 PLAS-SCNC: 24 MMOL/L (ref 22–29)
ERYTHROCYTE [DISTWIDTH] IN BLOOD BY AUTOMATED COUNT: 14.3 % (ref 10–15)
GFR SERPL CREATININE-BSD FRML MDRD: 76 ML/MIN/1.73M2
GLUCOSE SERPL-MCNC: 96 MG/DL (ref 70–99)
HCT VFR BLD AUTO: 34.2 % (ref 35–47)
HGB BLD-MCNC: 11.2 G/DL (ref 11.7–15.7)
MAGNESIUM SERPL-MCNC: 1.8 MG/DL (ref 1.7–2.3)
MCH RBC QN AUTO: 30.8 PG (ref 26.5–33)
MCHC RBC AUTO-ENTMCNC: 32.7 G/DL (ref 31.5–36.5)
MCV RBC AUTO: 94 FL (ref 78–100)
PLATELET # BLD AUTO: 354 10E3/UL (ref 150–450)
POTASSIUM SERPL-SCNC: 4.2 MMOL/L (ref 3.4–5.3)
PROT/CREAT 24H UR: 0.89 MG/MG CR (ref 0–0.2)
RBC # BLD AUTO: 3.64 10E6/UL (ref 3.8–5.2)
SODIUM SERPL-SCNC: 141 MMOL/L (ref 136–145)
TACROLIMUS BLD-MCNC: 3.6 UG/L (ref 5–15)
TME LAST DOSE: ABNORMAL H
TME LAST DOSE: ABNORMAL H
WBC # BLD AUTO: 8.5 10E3/UL (ref 4–11)

## 2023-08-23 PROCEDURE — 80048 BASIC METABOLIC PNL TOTAL CA: CPT

## 2023-08-23 PROCEDURE — 83735 ASSAY OF MAGNESIUM: CPT

## 2023-08-23 PROCEDURE — 85027 COMPLETE CBC AUTOMATED: CPT

## 2023-08-23 PROCEDURE — 36415 COLL VENOUS BLD VENIPUNCTURE: CPT

## 2023-08-23 PROCEDURE — 80197 ASSAY OF TACROLIMUS: CPT

## 2023-08-23 PROCEDURE — 84156 ASSAY OF PROTEIN URINE: CPT

## 2023-08-23 NOTE — PROGRESS NOTES
Gelacio Mcintyre MD Sveiven, Sara, RN  I'm ok monitoring for now with each lab. If it remains >0.5g/g we can change to ARB.          Previous Messages       ----- Message -----  From: Jennifer Trejo, RN  Sent: 8/23/2023  11:59 AM CDT  To: Gelacio Mcintyre MD    [facility] down trending,  would you like to continue to monitor and remain on nifedipine or make the switch to an ARB?    Jennifer    Per my chart on 8/23, patient reports diarrhea.  Will obtain  c diff, enteric stool sample and CMV.      All orders placed, patient aware.    See my chart

## 2023-08-24 ENCOUNTER — TELEPHONE (OUTPATIENT)
Dept: TRANSPLANT | Facility: CLINIC | Age: 33
End: 2023-08-24
Payer: MEDICARE

## 2023-08-24 ENCOUNTER — LAB (OUTPATIENT)
Dept: LAB | Facility: HOSPITAL | Age: 33
End: 2023-08-24
Payer: MEDICARE

## 2023-08-24 DIAGNOSIS — R19.7 DIARRHEA, UNSPECIFIED TYPE: ICD-10-CM

## 2023-08-24 DIAGNOSIS — Z94.0 KIDNEY REPLACED BY TRANSPLANT: ICD-10-CM

## 2023-08-24 LAB
ADV 40+41 DNA STL QL NAA+NON-PROBE: NEGATIVE
ASTRO TYP 1-8 RNA STL QL NAA+NON-PROBE: NEGATIVE
C CAYETANENSIS DNA STL QL NAA+NON-PROBE: NEGATIVE
C DIFF GDH STL QL IA: POSITIVE
C DIFF TOX A+B STL QL IA: POSITIVE
C DIFF TOX B STL QL: POSITIVE
CAMPYLOBACTER DNA SPEC NAA+PROBE: NEGATIVE
CRYPTOSP DNA STL QL NAA+NON-PROBE: NEGATIVE
E COLI O157 DNA STL QL NAA+NON-PROBE: NORMAL
E HISTOLYT DNA STL QL NAA+NON-PROBE: NEGATIVE
EAEC ASTA GENE ISLT QL NAA+PROBE: NEGATIVE
EC STX1+STX2 GENES STL QL NAA+NON-PROBE: NEGATIVE
EPEC EAE GENE STL QL NAA+NON-PROBE: NEGATIVE
ETEC LTA+ST1A+ST1B TOX ST NAA+NON-PROBE: NEGATIVE
G LAMBLIA DNA STL QL NAA+NON-PROBE: NEGATIVE
NOROVIRUS GI+II RNA STL QL NAA+NON-PROBE: NEGATIVE
P SHIGELLOIDES DNA STL QL NAA+NON-PROBE: NEGATIVE
RVA RNA STL QL NAA+NON-PROBE: NEGATIVE
SALMONELLA SP RPOD STL QL NAA+PROBE: NEGATIVE
SAPO I+II+IV+V RNA STL QL NAA+NON-PROBE: NEGATIVE
SHIGELLA SP+EIEC IPAH ST NAA+NON-PROBE: NEGATIVE
V CHOLERAE DNA SPEC QL NAA+PROBE: NEGATIVE
VIBRIO DNA SPEC NAA+PROBE: NEGATIVE
Y ENTEROCOL DNA STL QL NAA+PROBE: NEGATIVE

## 2023-08-24 PROCEDURE — 87507 IADNA-DNA/RNA PROBE TQ 12-25: CPT

## 2023-08-24 PROCEDURE — 87493 C DIFF AMPLIFIED PROBE: CPT

## 2023-08-24 PROCEDURE — 36415 COLL VENOUS BLD VENIPUNCTURE: CPT

## 2023-08-24 PROCEDURE — 87324 CLOSTRIDIUM AG IA: CPT | Mod: XU

## 2023-08-24 RX ORDER — TACROLIMUS 1 MG/1
5 CAPSULE ORAL 2 TIMES DAILY
Qty: 300 CAPSULE | Refills: 11 | Status: SHIPPED | OUTPATIENT
Start: 2023-08-24 | End: 2023-09-06

## 2023-08-24 NOTE — TELEPHONE ENCOUNTER
Tacrolimus level 3.6 at 12 hours, on 8/23/2023.  Goal 4-6.   Current dose 4 mg in AM, 4 mg in PM    Dose changed to 5 mg in AM, 5 mg in PM   Recheck level in 1 week    Detailed message left for the patient with instruction to increase tacrolimus dose if accurate 12 hour trough level.    Teranode message sent     Jennifer Trejo RN   Transplant Coordinator  [phone]

## 2023-08-25 ENCOUNTER — TELEPHONE (OUTPATIENT)
Dept: TRANSPLANT | Facility: CLINIC | Age: 33
End: 2023-08-25
Payer: MEDICARE

## 2023-08-25 DIAGNOSIS — A49.8 CLOSTRIDIUM DIFFICILE INFECTION: Primary | ICD-10-CM

## 2023-08-25 LAB — CMV DNA SPEC NAA+PROBE-ACNC: NOT DETECTED IU/ML

## 2023-08-25 RX ORDER — VANCOMYCIN HYDROCHLORIDE 125 MG/1
125 CAPSULE ORAL 4 TIMES DAILY
Qty: 56 CAPSULE | Refills: 0 | Status: SHIPPED | OUTPATIENT
Start: 2023-08-25 | End: 2023-09-08

## 2023-08-25 NOTE — TELEPHONE ENCOUNTER
ISSUE: C Diff+    PLAN:  Gelacio Mcintyre MD Sveiven, Jennifer, RN  Please start vanc 125mg po q6h x14d    OUTCOME: Patient V/U of starting Vancomycin for C Diff.  Patients infant currently in NICU,  RNCC advised patient to speak with NICU staff prior to visiting today. She V/U    Jennifer Trejo RN   Transplant Coordinator  [phone]

## 2023-08-27 PROBLEM — O09.93 HIGH-RISK PREGNANCY IN THIRD TRIMESTER: Status: RESOLVED | Noted: 2023-02-14 | Resolved: 2023-08-27

## 2023-08-27 PROBLEM — N18.31 STAGE 3A CHRONIC KIDNEY DISEASE (H): Status: RESOLVED | Noted: 2023-05-23 | Resolved: 2023-08-27

## 2023-08-27 PROBLEM — O14.90 PREECLAMPSIA: Status: RESOLVED | Noted: 2023-08-15 | Resolved: 2023-08-27

## 2023-08-27 PROBLEM — O16.2 ELEVATED BLOOD PRESSURE AFFECTING PREGNANCY IN SECOND TRIMESTER, ANTEPARTUM: Status: RESOLVED | Noted: 2023-06-08 | Resolved: 2023-08-27

## 2023-08-27 PROBLEM — O47.00 PRETERM CONTRACTIONS: Status: RESOLVED | Noted: 2023-06-11 | Resolved: 2023-08-27

## 2023-08-27 PROBLEM — I95.1 ORTHOSTATIC HYPOTENSION: Status: RESOLVED | Noted: 2022-08-02 | Resolved: 2023-08-27

## 2023-08-27 PROBLEM — O23.03 PYELONEPHRITIS AFFECTING PREGNANCY IN THIRD TRIMESTER: Status: RESOLVED | Noted: 2023-08-03 | Resolved: 2023-08-27

## 2023-08-27 PROBLEM — N18.2 CKD (CHRONIC KIDNEY DISEASE) STAGE 2, GFR 60-89 ML/MIN: Status: ACTIVE | Noted: 2019-08-29

## 2023-08-27 NOTE — PROGRESS NOTES
TRANSPLANT NEPHROLOGY CHRONIC POST TRANSPLANT VISIT    Assessment & Plan   # DDKT: Trend down in creatinine after recent mild CAMERON felt due to preeclampsia.  Patient has now delivered her baby.  Still some proteinuria, which may take time to resolve.  If no improvement over the next month or so, would consider a kidney transplant biopsy.   - Baseline Creatinine:  ~ 0.8-1.1   - Proteinuria: Moderate (1-3 grams)   - Date DSA Last Checked: Sep/2022      Latest DSA: No cPRA: 25%   - BK Viremia: No   - Kidney Tx Biopsy: Sep 17, 2019; Result: No diagnostic evidence of acute rejection.    # Immunosuppression: Tacrolimus immediate release (goal 4-6) and Mycophenolic acid (dose 540 mg every 12 hours)   - Continue with intensive monitoring of immunosuppression for efficacy and toxicity.   - Changes: Not at this time; Recently changed off azathioprine back to mycophenolic acid following pregnancy.    # Infection Prophylaxis:   Last CD4 Level: 201 (Jul/2020)  - PJP: None    # Blood Pressure: Controlled;  Goal BP: > 100, but < 130 systolic   - Changes: Not at this time; Patient was on Florinef prior to pregnancy.  Would encourage good hydration.    # Anemia in Chronic Renal Disease: Hgb: Stable      CHARLINE: No   - Iron studies: Replete    # Mineral Bone Disorder: Patient is s/p parathyroidectomy 4/2023 with 3.5 glands removed.  - Tertiary renal hyperparathyroidism; PTH level:  Low (<15 pg/ml)         On treatment: None; Calcitriol stopped with high normal serum calcium level and low PTH.  - Vitamin D; level: Low        On supplement: Yes  - Calcium; level: Normal        On supplement: Yes    # Electrolytes:   - Potassium; level: Normal        On supplement: No  - Magnesium; level: Not checked recently        On supplement: Yes  - Bicarbonate; level: Stable low        On supplement: Yes    # Allograft Ureteral Stenosis/Hydronephrosis: Patient with moderate hydronephrosis of kidney transplant and developed CAMERON.  She underwent PNT  6/9/23 with repeat kidney transplant ultrasound 6/11 still showing moderate hydronephrosis suggesting this may be a functional hydronephrosis from pregnancy.  However, patient does report resolution of edema and dyspnea, as well as apparent post-obstructive diuresis.  She has a previous h/o ureteral stenosis with ureteral stent removed 2/2021.  Previous kidney transplant ultrasound 7/2021 also showed moderate hydronephrosis unchanged with voiding. Hydronephrosis was unchanged on 7/6/22 abdominal CT.  Now s/p PNT exchange 8/14/23.    # Pregnancy/Preeclampsia: Patient delivered at 32 weeks 6 days with some preeclampsia symptoms.  She still has proteinuria, but otherwise, other symptoms have resolved.    # GERD: Controlled on H2 blocler.    # Hyperprolactinemia: Normal prolactin level with last check.  Felt secondary to hypothyroidism versus kidney failure, or less likely now a pituitary microadenoma.  Followed by Endocrinology.    # Right Axillary Lymph Node: Patient was referred to general surgery for excisional biopsy in 8/2022, but decision was made to watch the node.  Node appears to be stable for the last year at least.   - Would consider excisional biopsy post partum.    # Skin Cancer Risk:    - Discussed sun protection and recommend regular follow up with Dermatology.    # Medical Compliance: Yes    # Health Maintenance and Vaccination Review: Not Reviewed    # Transplant History:  Etiology of Kidney Failure: Unknown etiology (no kidney biopsy)  Tx: DDKT  Transplant: 8/3/2019 (Kidney)  Significant changes in immunosuppression:  Changed from mycophenolate to azathioprine for pregnancy.  Significant transplant-related complications: Transplant ureteral stenosis    Transplant Office Phone Number: [phone]    Assessment and plan was discussed with the patient and she voiced her understanding and agreement.    Return visit: Return for previously scheduled visit.    Nakul Hill MD    Chief Complaint    Ms. Wagner is a 32 year old here for kidney transplant and immunosuppression management.    History of Present Illness    Ms. Wagner reports feeling good overall with some medical complaints.  She was recently admitted for  contractions and some preeclampsia symptoms and ended up giving birth to a baby boy at 32 weeks 6 days gestation.  Her baby remains in the NICU, but reportedly doing better.  She is now at home.  She is not, nor planning on, doing breast feeding.  Patient's immunosuppression was changed from azathioprine back to mycophenolic acid following delivery.    Her energy level is better, but not quite back to normal as she is still recovering from her pregnancy.  She is active, but really no exercise yet.  Denies any chest pain or shortness of breath with exertion, but does note that he heart rate increases when she is up and moving around.  No leg swelling.    Appetite is okay and her weight is now down ~ 20 lbs.  Slight nausea, but no vomiting.  Occasional loose stools.  No heartburn symptoms.  No fever, sweats or chills.  No night sweats.    Patient still has her PNT, which was exchanged a couple of days ago.  She notes almost all the urine is coming into the PNT and only a little urine via her bladder when she gets up in the morning.    Home BP:  110/80s with no lightheadedness.    Problem List   Patient Active Problem List   Diagnosis    DVT of upper extremity (deep vein thrombosis) (H)    Seizure disorder (H)    Acquired hypothyroidism    Hypomagnesemia    Aftercare following organ transplant    Immunosuppressed status (H)    Kidney replaced by transplant    Anxiety    Vitamin D deficiency    CKD (chronic kidney disease) stage 2, GFR 60-89 ml/min    Bicornate uterus    Hydronephrosis of kidney transplant    Ureteral stenosis    Anemia in chronic renal disease       Allergies   Allergies   Allergen Reactions    Contrast Dye Rash     CT contrast allergy 19 rash over eyes. Need to have pre  medication before a CT WITH CONTRAST     Diatrizoate Rash     CT contrast allergy 12/14/19 rash over eyes. Need to have pre medication before a CT WITH CONTRAST     Lisinopril Swelling     angioedema    Nitrous Oxide Other (See Comments)     Sense of doom    Chlorhexidine Rash     Rash at site    Sulfa Antibiotics Rash     Muscle stiffness of neck    Dapsone      Methemoglobinemia    Furosemide Other (See Comments)     Skin flushing    Metrogel [Metronidazole]      Hives diffusely on body after vaginal administration.    Azithromycin Dizziness and Rash    Cefuroxime Rash       Medications   Current Outpatient Medications   Medication Sig    acetaminophen (TYLENOL) 325 MG tablet Take 2 tablets (650 mg) by mouth every 4 hours as needed for mild pain    calcium carbonate 1250 MG/5ML SUSP suspension Take 2.5 mLs (625 mg) by mouth daily    cyanocobalamin (VITAMIN B-12) 1000 MCG tablet Take 1 tablet (1,000 mcg) by mouth daily (Patient not taking: Reported on 8/18/2023)    hydrocortisone 2.5 % cream Apply topically 2 times daily    lactase (LACTAID) 3000 UNIT tablet Take 3,000 Units by mouth 3 times daily (with meals)    levothyroxine (SYNTHROID/LEVOTHROID) 25 MCG tablet Take 1 tablet (25 mcg) by mouth daily    magnesium oxide (MAG-OX) 400 MG tablet Take 800 mg by mouth every morning    NIFEdipine ER (ADALAT CC) 30 MG 24 hr tablet Take 1 tablet (30 mg) by mouth daily Start taking one tablet if blood pressure greater than 130/80.    patiromer (VELTASSA) 8.4 g packet Take 8.4 g by mouth daily as needed (as directed by your transplant team, for potassium = or > 5.5)    polyethylene glycol (MIRALAX) 17 GM/Dose powder Take 17 g (1 capful) by mouth daily as needed for constipation    sodium bicarbonate 650 MG tablet Take 2 tablets (1,300 mg) by mouth 3 times daily for 90 days    vitamin D3 (CHOLECALCIFEROL) 50 mcg (2000 units) tablet Take 1 tablet (50 mcg) by mouth daily for 90 days (Patient taking differently: Take 1 tablet by  mouth every morning)    fluticasone (FLONASE) 50 MCG/ACT nasal spray Spray 1 spray into both nostrils daily    mycophenolic acid (GENERIC EQUIVALENT) 180 MG EC tablet Take 3 tablets (540 mg) by mouth 2 times daily    tacrolimus (GENERIC EQUIVALENT) 1 MG capsule Take 5 capsules (5 mg) by mouth 2 times daily    vancomycin (VANCOCIN) 125 MG capsule Take 1 capsule (125 mg) by mouth 4 times daily for 14 days     No current facility-administered medications for this visit.     Medications Discontinued During This Encounter   Medication Reason    azaTHIOprine (IMURAN) 50 MG tablet        Physical Exam   Vital Signs: /82 (BP Location: Right arm, Patient Position: Sitting, Cuff Size: Adult Regular)   Pulse 90   Temp 98.1  F (36.7  C) (Oral)   Wt 72.5 kg (159 lb 14.4 oz)   LMP 12/16/2022   SpO2 98%   BMI 31.23 kg/m      GENERAL APPEARANCE: alert and no distress  HENT: mouth without ulcers or lesions  LYMPHATICS: no cervical or supraclavicular nodes; small ~ 0.5 cm right axillary lymph node, non tender  RESP: lungs clear to auscultation - no rales, rhonchi or wheezes  CV: regular rhythm, normal rate, no rub, 2/6 systolic murmur  EDEMA: no LE edema bilaterally  ABDOMEN: soft, nondistended, nontender, bowel sounds normal, overweight  MS: extremities normal - no gross deformities noted, no evidence of inflammation in joints, no muscle tenderness  SKIN: no rash  TX KIDNEY: normal  DIALYSIS ACCESS:  LUE AV fistula with good thrill    Data         Latest Ref Rng & Units 8/23/2023    10:07 AM 8/17/2023    10:04 AM 8/14/2023    12:49 PM   Renal   Sodium 136 - 145 mmol/L 141  139  140    K 3.4 - 5.3 mmol/L 4.2  4.2  4.5    Cl 98 - 107 mmol/L 105  105  105    Cl (external) 98 - 107 mmol/L 105  105  105    CO2 22 - 29 mmol/L 24  21  24    Urea Nitrogen 6.0 - 20.0 mg/dL 26.7  27.4  28.2    Creatinine 0.51 - 0.95 mg/dL 1.01  1.10  1.15    Glucose 70 - 99 mg/dL 96  108  119    Calcium 8.6 - 10.0 mg/dL 9.4  9.4  9.2    Magnesium  1.7 - 2.3 mg/dL 1.8  1.5           Latest Ref Rng & Units 7/13/2023    10:15 AM 6/15/2023    10:09 AM 6/12/2023     2:35 AM   Bone Health   Phosphorus 2.5 - 4.5 mg/dL 3.3  3.6  2.6          Latest Ref Rng & Units 8/23/2023    10:07 AM 8/17/2023    10:04 AM 8/14/2023    12:49 PM   Heme   WBC 4.0 - 11.0 10e3/uL 8.5  7.2  9.8    Hgb 11.7 - 15.7 g/dL 11.2  10.6  9.7    Plt 150 - 450 10e3/uL 354  346  212          Latest Ref Rng & Units 8/14/2023    12:49 PM 8/13/2023     6:33 AM 8/12/2023     8:49 AM   Liver   AP 35 - 104 U/L 112  104  132    TBili <=1.2 mg/dL 0.7  0.8  1.1    ALT 0 - 50 U/L 36  30  38    AST 0 - 45 U/L 36  29  38    Tot Protein 6.4 - 8.3 g/dL 6.3  5.9  6.6    Albumin 3.5 - 5.2 g/dL 3.2  2.9  3.2          Latest Ref Rng & Units 2/6/2023    10:23 AM 6/22/2022     6:39 AM 8/4/2020     9:05 AM   Pancreas   A1C <5.7 % 5.2   5.5    Lipase 0 - 52 U/L  39           Latest Ref Rng & Units 7/31/2023    10:17 AM 5/22/2023    10:23 AM 5/22/2023    10:22 AM   Iron studies   Iron 37 - 145 ug/dL 94   80    Iron Sat Index 15 - 46 % 35   41    Ferritin 6 - 175 ng/mL 1,525  1,618           Latest Ref Rng & Units 7/7/2021     9:20 AM 6/2/2021     9:25 AM 5/3/2021     9:02 AM   [facility] Txp Virology   BK Spec  Plasma  Plasma  Plasma    BK Res BKNEG^BK Virus DNA Not Detected copies/mL BK Virus DNA Not Detected  BK Virus DNA Not Detected  BK Virus DNA Not Detected    BK Log <2.7 Log copies/mL Not Calculated  Not Calculated  Not Calculated        Recent Labs   Lab Test 08/14/23  0752 08/14/23  2249 08/17/23  1004 08/23/23  1007   DOSTAC 8/13/2023  --  8/16/2023 8/22/2023   TACROL 4.8* 4.5* 4.0* 3.6*     Recent Labs   Lab Test 08/16/19  0807 09/03/19  0944   DOSMPA Not Provided 0840pm   MPACID 1.92 3.39   MPAG 149.2* 52.8

## 2023-08-29 ENCOUNTER — LAB (OUTPATIENT)
Dept: LAB | Facility: HOSPITAL | Age: 33
End: 2023-08-29
Payer: MEDICARE

## 2023-08-29 ENCOUNTER — TELEPHONE (OUTPATIENT)
Dept: ENDOCRINOLOGY | Facility: CLINIC | Age: 33
End: 2023-08-29

## 2023-08-29 DIAGNOSIS — Z48.298 AFTERCARE FOLLOWING ORGAN TRANSPLANT: ICD-10-CM

## 2023-08-29 DIAGNOSIS — Z94.0 KIDNEY REPLACED BY TRANSPLANT: ICD-10-CM

## 2023-08-29 DIAGNOSIS — E03.9 ACQUIRED HYPOTHYROIDISM: ICD-10-CM

## 2023-08-29 LAB
ALBUMIN MFR UR ELPH: 21.4 MG/DL
ANION GAP SERPL CALCULATED.3IONS-SCNC: 12 MMOL/L (ref 7–15)
BUN SERPL-MCNC: 26.3 MG/DL (ref 6–20)
CALCIUM SERPL-MCNC: 9 MG/DL (ref 8.6–10)
CHLORIDE SERPL-SCNC: 105 MMOL/L (ref 98–107)
CREAT SERPL-MCNC: 1.1 MG/DL (ref 0.51–0.95)
CREAT UR-MCNC: 29.6 MG/DL
DEPRECATED HCO3 PLAS-SCNC: 23 MMOL/L (ref 22–29)
ERYTHROCYTE [DISTWIDTH] IN BLOOD BY AUTOMATED COUNT: 13.3 % (ref 10–15)
GFR SERPL CREATININE-BSD FRML MDRD: 69 ML/MIN/1.73M2
GLUCOSE SERPL-MCNC: 102 MG/DL (ref 70–99)
HCT VFR BLD AUTO: 34.3 % (ref 35–47)
HGB BLD-MCNC: 11.3 G/DL (ref 11.7–15.7)
MCH RBC QN AUTO: 30.3 PG (ref 26.5–33)
MCHC RBC AUTO-ENTMCNC: 32.9 G/DL (ref 31.5–36.5)
MCV RBC AUTO: 92 FL (ref 78–100)
PLATELET # BLD AUTO: 217 10E3/UL (ref 150–450)
POTASSIUM SERPL-SCNC: 4.3 MMOL/L (ref 3.4–5.3)
PROT/CREAT 24H UR: 0.72 MG/MG CR (ref 0–0.2)
RBC # BLD AUTO: 3.73 10E6/UL (ref 3.8–5.2)
SODIUM SERPL-SCNC: 140 MMOL/L (ref 136–145)
T3FREE SERPL-MCNC: 2.5 PG/ML (ref 2–4.4)
T4 FREE SERPL-MCNC: 1.31 NG/DL (ref 0.9–1.7)
TACROLIMUS BLD-MCNC: 5.5 UG/L (ref 5–15)
TME LAST DOSE: NORMAL H
TME LAST DOSE: NORMAL H
TSH SERPL DL<=0.005 MIU/L-ACNC: 1.2 UIU/ML (ref 0.3–4.2)
WBC # BLD AUTO: 6.4 10E3/UL (ref 4–11)

## 2023-08-29 PROCEDURE — 85018 HEMOGLOBIN: CPT

## 2023-08-29 PROCEDURE — 36415 COLL VENOUS BLD VENIPUNCTURE: CPT

## 2023-08-29 PROCEDURE — 85041 AUTOMATED RBC COUNT: CPT

## 2023-08-29 PROCEDURE — 84481 FREE ASSAY (FT-3): CPT

## 2023-08-29 PROCEDURE — 80197 ASSAY OF TACROLIMUS: CPT

## 2023-08-29 PROCEDURE — 84156 ASSAY OF PROTEIN URINE: CPT

## 2023-08-29 PROCEDURE — 84439 ASSAY OF FREE THYROXINE: CPT

## 2023-08-29 PROCEDURE — 80048 BASIC METABOLIC PNL TOTAL CA: CPT

## 2023-08-29 PROCEDURE — 84443 ASSAY THYROID STIM HORMONE: CPT

## 2023-08-29 NOTE — TELEPHONE ENCOUNTER
----- Message from Katrin Lopez MD sent at 8/24/2023  6:36 AM CDT -----  Ok - October 6th (Friday) at 4:45 as virtual - thanks!  ----- Message -----  From: Tracey Sanders RN  Sent: 8/23/2023   3:11 PM CDT  To: Katrin Lopez MD    Just had baby  do you want her to wait until  4/2024 to be seen or see sooner ?   ----- Message -----  From: Vera Rodriguez  Sent: 8/23/2023  10:51 AM CDT  To: [facility]    Hello..    I just spoke with this pt and sched a virtual follow up appointment with Dr. Lopez in April 2024. She mentioned that she just had her baby and is asking if this appointment should actually be much sooner.    Please advise, Thank You

## 2023-08-29 NOTE — TELEPHONE ENCOUNTER
Voicemail left to check my chart with appointment offer per Dr Lopez . Once she replies it still needs to be scheduled. Tracey Sanders RN on 8/29/2023 at 2:47 PM

## 2023-08-30 ENCOUNTER — TELEPHONE (OUTPATIENT)
Dept: ENDOCRINOLOGY | Facility: CLINIC | Age: 33
End: 2023-08-30
Payer: MEDICARE

## 2023-08-30 NOTE — TELEPHONE ENCOUNTER
Results reviewed with pt    -  Dear Mona    Here are your labs which are GREAT - levothyroxine at 25 mcg daily    If you have any questions, please feel free to contact my nurse at [phone] select option #3 for triage nurse  or  option #1 for scheduling related questions.    Regards    Katrin Lopez MD   Component      Latest Ref Rng 8/29/2023  10:09 AM   Sodium      136 - 145 mmol/L 140    Potassium      3.4 - 5.3 mmol/L 4.3    Chloride      98 - 107 mmol/L 105    Carbon Dioxide (CO2)      22 - 29 mmol/L 23    Anion Gap      7 - 15 mmol/L 12    Urea Nitrogen      6.0 - 20.0 mg/dL 26.3 (H)    Creatinine      0.51 - 0.95 mg/dL 1.10 (H)    Calcium      8.6 - 10.0 mg/dL 9.0    Glucose      70 - 99 mg/dL 102 (H)    GFR Estimate      >60 mL/min/1.73m2 69    TSH      0.30 - 4.20 uIU/mL 1.20    Free T3      2.0 - 4.4 pg/mL 2.5    T4 Free      0.90 - 1.70 ng/dL 1.31       Legend:  (H) High

## 2023-08-31 ENCOUNTER — PATIENT OUTREACH (OUTPATIENT)
Dept: CARE COORDINATION | Facility: CLINIC | Age: 33
End: 2023-08-31
Payer: MEDICARE

## 2023-08-31 NOTE — PROGRESS NOTES
Anemia Management Note  SUBJECTIVE/OBJECTIVE:  Referred by Dr. Gelacio Mcintyre on 2023  Primary Diagnosis: Anemia in Chronic Kidney Disease (N18.3, D63.1)   3a  Secondary Diagnosis:  Chronic Kidney Disease, Stage 3 (N18.3)  3a  Hgb goal range:  9-10  Kidney Tx: 8/3/2019  Epo/Darbo: Aranesp 40mcg every 14 days for Hgb <10.0. In Clinic.   Iron regimen:  Prenatal vit with Iron.   *Elevated Ferritin levels.   Labs : 2024  Recent CHARLINE use, transfusion, IV iron: Aranesp .  RX/TX plans : 2024     *Prefers [facility]      Hx of DVT (), High Risk Pregnancy (2nd trimester).     Contact: OK to speak with Stephan Wagner (father) and Savanah Wagner (sister) regarding Medical Information per consent to communicate dated 4/3/2020.           Latest Ref Rng & Units 2023     7:45 AM 2023     8:49 AM 2023     6:33 AM 2023    12:49 PM 2023    10:04 AM 2023    10:07 AM 2023    10:09 AM   Anemia   Hemoglobin 11.7 - 15.7 g/dL 10.8  11.1  9.4  9.7  10.6  11.2  11.3      BP Readings from Last 3 Encounters:   23 116/80   23 119/82   08/15/23 111/68     Wt Readings from Last 2 Encounters:   23 72.2 kg (159 lb 1.6 oz)   23 72.5 kg (159 lb 14.4 oz)           ASSESSMENT:  Hgb:Above goal - recommend hold dose  TSat: at goal >30% Ferritin: Elevated (>1000ng/mL)    PLAN:  Hold Aranesp. Spoke with Mona ok to hold off on scheduling Aranesp.  Mona states that she had her baby 2 weeks ago and is feeling really good.  Will monitor Hgb.  Mona states she is having labs drawn weekly right now.   Will contact Mona if Hgb starts to drop.  Mona agreed to this plan.     Orders needed to be renewed (for next follow-up date) in EPIC: None    Iron labs due:  Due    Plan discussed with:  Mona      NEXT FOLLOW-UP DATE:  23    Manjula Mckinnon RN   Anemia Services  [facility]  [email or web address]   Office : [phone]  Fax: [phone]

## 2023-09-06 ENCOUNTER — TELEPHONE (OUTPATIENT)
Dept: TRANSPLANT | Facility: CLINIC | Age: 33
End: 2023-09-06

## 2023-09-06 ENCOUNTER — LAB (OUTPATIENT)
Dept: LAB | Facility: HOSPITAL | Age: 33
End: 2023-09-06
Payer: MEDICARE

## 2023-09-06 DIAGNOSIS — Z94.0 KIDNEY REPLACED BY TRANSPLANT: Primary | ICD-10-CM

## 2023-09-06 DIAGNOSIS — R39.9 UTI SYMPTOMS: ICD-10-CM

## 2023-09-06 DIAGNOSIS — R39.9 UTI SYMPTOMS: Primary | ICD-10-CM

## 2023-09-06 DIAGNOSIS — Z94.0 KIDNEY REPLACED BY TRANSPLANT: ICD-10-CM

## 2023-09-06 DIAGNOSIS — Z88.1 ALLERGY TO MULTIPLE ANTIBIOTICS: ICD-10-CM

## 2023-09-06 DIAGNOSIS — Z48.298 AFTERCARE FOLLOWING ORGAN TRANSPLANT: ICD-10-CM

## 2023-09-06 LAB
ALBUMIN MFR UR ELPH: 13.7 MG/DL
ALBUMIN UR-MCNC: NEGATIVE MG/DL
ANION GAP SERPL CALCULATED.3IONS-SCNC: 10 MMOL/L (ref 7–15)
APPEARANCE UR: CLEAR
BACTERIA #/AREA URNS HPF: ABNORMAL /HPF
BILIRUB UR QL STRIP: NEGATIVE
BUN SERPL-MCNC: 37.1 MG/DL (ref 6–20)
CALCIUM SERPL-MCNC: 9.9 MG/DL (ref 8.6–10)
CHLORIDE SERPL-SCNC: 102 MMOL/L (ref 98–107)
COLOR UR AUTO: COLORLESS
CREAT SERPL-MCNC: 1.25 MG/DL (ref 0.51–0.95)
CREAT UR-MCNC: 35.7 MG/DL
DEPRECATED HCO3 PLAS-SCNC: 25 MMOL/L (ref 22–29)
ERYTHROCYTE [DISTWIDTH] IN BLOOD BY AUTOMATED COUNT: 13.2 % (ref 10–15)
GFR SERPL CREATININE-BSD FRML MDRD: 59 ML/MIN/1.73M2
GLUCOSE SERPL-MCNC: 106 MG/DL (ref 70–99)
GLUCOSE UR STRIP-MCNC: NEGATIVE MG/DL
HCT VFR BLD AUTO: 36 % (ref 35–47)
HGB BLD-MCNC: 11.7 G/DL (ref 11.7–15.7)
HGB UR QL STRIP: NEGATIVE
KETONES UR STRIP-MCNC: NEGATIVE MG/DL
LEUKOCYTE ESTERASE UR QL STRIP: ABNORMAL
MCH RBC QN AUTO: 29.9 PG (ref 26.5–33)
MCHC RBC AUTO-ENTMCNC: 32.5 G/DL (ref 31.5–36.5)
MCV RBC AUTO: 92 FL (ref 78–100)
MUCOUS THREADS #/AREA URNS LPF: PRESENT /LPF
NITRATE UR QL: NEGATIVE
PH UR STRIP: 6 [PH] (ref 5–7)
PLATELET # BLD AUTO: 219 10E3/UL (ref 150–450)
POTASSIUM SERPL-SCNC: 4.9 MMOL/L (ref 3.4–5.3)
PROT/CREAT 24H UR: 0.38 MG/MG CR (ref 0–0.2)
RBC # BLD AUTO: 3.91 10E6/UL (ref 3.8–5.2)
RBC URINE: 0 /HPF
SODIUM SERPL-SCNC: 137 MMOL/L (ref 136–145)
SP GR UR STRIP: 1.01 (ref 1–1.03)
TACROLIMUS BLD-MCNC: 7.7 UG/L (ref 5–15)
TME LAST DOSE: NORMAL H
TME LAST DOSE: NORMAL H
UROBILINOGEN UR STRIP-MCNC: <2 MG/DL
WBC # BLD AUTO: 7 10E3/UL (ref 4–11)
WBC URINE: 4 /HPF

## 2023-09-06 PROCEDURE — 36415 COLL VENOUS BLD VENIPUNCTURE: CPT

## 2023-09-06 PROCEDURE — 80197 ASSAY OF TACROLIMUS: CPT

## 2023-09-06 PROCEDURE — 80048 BASIC METABOLIC PNL TOTAL CA: CPT

## 2023-09-06 PROCEDURE — 81003 URINALYSIS AUTO W/O SCOPE: CPT

## 2023-09-06 PROCEDURE — 84156 ASSAY OF PROTEIN URINE: CPT

## 2023-09-06 PROCEDURE — 87086 URINE CULTURE/COLONY COUNT: CPT

## 2023-09-06 PROCEDURE — 85027 COMPLETE CBC AUTOMATED: CPT

## 2023-09-06 RX ORDER — VANCOMYCIN HYDROCHLORIDE 125 MG/1
125 CAPSULE ORAL 2 TIMES DAILY
Qty: 20 CAPSULE | Refills: 0 | Status: SHIPPED | OUTPATIENT
Start: 2023-09-09 | End: 2023-09-08

## 2023-09-06 RX ORDER — LEVOFLOXACIN 500 MG/1
500 TABLET, FILM COATED ORAL DAILY
Qty: 10 TABLET | Refills: 0 | Status: SHIPPED | OUTPATIENT
Start: 2023-09-06 | End: 2023-09-08

## 2023-09-06 RX ORDER — TACROLIMUS 1 MG/1
4 CAPSULE ORAL 2 TIMES DAILY
Qty: 240 CAPSULE | Refills: 11 | Status: SHIPPED | OUTPATIENT
Start: 2023-09-06 | End: 2023-09-15

## 2023-09-06 NOTE — TELEPHONE ENCOUNTER
Patient states she is having some burning with urination and urinary urgency.    UA added onto urine specimen     Levo 500 mg daily 10 days,  continue Vanco 125 mg bid until off levo, currently on PO vanco for C dif    Are you breast feeding? No    Patient V/U RX sent to local isidoro Trejo RN   Transplant Coordinator  [phone]

## 2023-09-06 NOTE — TELEPHONE ENCOUNTER
Patient Call: General    Reason for call: patient is felling some burning through the Urethra and did leave a urine sample from her urine bag for today's lab. Patient would like a return call has questions on if there is something she can take while the lab is pending?    Call back needed? Yes    Return Call Needed  Same as documented in contacts section  When to return call?: Same day: Route High Priority

## 2023-09-06 NOTE — TELEPHONE ENCOUNTER
ISSUE: elevated creatinine: 1.25, baseline 0.8-1.1    PLAN:  Call and assess hydration status.  How much water is he drinking per day?  Patient states she is drinking ~48 oz of water per day as her baby came home from the NICU last week and she is focusing on the baby.      Any recent illness, diarrhea, s/s of a UTI, or medication changes?  Patient currently being treated for c diff with PO vancomycin- 2 days remaining on abx regimen.  Diarrhea has resolved.    Any increased intake of alcohol or caffeine?  Denies    BP: ~110/80s not currently taking her nifedipine. Denies swelling.    Recommend increasing hydration and repeating labs within 1 week.    Spoke with IR as patient is due for nephrostomy exchange or removal pending imaging.  Patient was scheduled last week however patient did cancel.  Additional urgent IR referral placed.  IR to reach out to scheduled.    Jennifer Trejo RN   Transplant Coordinator  [phone]

## 2023-09-06 NOTE — TELEPHONE ENCOUNTER
Tacrolimus level 7.7 at 12 hours, on 9/6/2023.  Goal 4-6.   Current dose 5 mg in AM, 5 mg in PM    Dose changed to 4 mg in AM, 4 mg in PM   Recheck level in 1 week     Lema21 message sent     Jennifer Trejo RN   Transplant Coordinator  [phone]

## 2023-09-08 ENCOUNTER — HOSPITAL ENCOUNTER (OUTPATIENT)
Facility: CLINIC | Age: 33
Setting detail: OBSERVATION
Discharge: HOME | End: 2023-09-08
Attending: EMERGENCY MEDICINE | Admitting: SURGERY
Payer: MEDICARE

## 2023-09-08 ENCOUNTER — TELEPHONE (OUTPATIENT)
Dept: TRANSPLANT | Facility: CLINIC | Age: 33
End: 2023-09-08

## 2023-09-08 ENCOUNTER — APPOINTMENT (OUTPATIENT)
Dept: ULTRASOUND IMAGING | Facility: CLINIC | Age: 33
End: 2023-09-08
Attending: EMERGENCY MEDICINE
Payer: MEDICARE

## 2023-09-08 VITALS
HEART RATE: 82 BPM | WEIGHT: 153 LBS | TEMPERATURE: 98.4 F | HEIGHT: 60 IN | OXYGEN SATURATION: 97 % | SYSTOLIC BLOOD PRESSURE: 121 MMHG | BODY MASS INDEX: 30.04 KG/M2 | RESPIRATION RATE: 16 BRPM | DIASTOLIC BLOOD PRESSURE: 79 MMHG

## 2023-09-08 DIAGNOSIS — K81.0 ACUTE CHOLECYSTITIS: ICD-10-CM

## 2023-09-08 DIAGNOSIS — A49.8 CLOSTRIDIUM DIFFICILE INFECTION: ICD-10-CM

## 2023-09-08 LAB
ALBUMIN SERPL BCG-MCNC: 4.1 G/DL (ref 3.5–5.2)
ALP SERPL-CCNC: 79 U/L (ref 35–104)
ALT SERPL W P-5'-P-CCNC: 15 U/L (ref 0–50)
ANION GAP SERPL CALCULATED.3IONS-SCNC: 11 MMOL/L (ref 7–15)
AST SERPL W P-5'-P-CCNC: 19 U/L (ref 0–45)
BACTERIA UR CULT: NORMAL
BASOPHILS # BLD AUTO: 0 10E3/UL (ref 0–0.2)
BASOPHILS NFR BLD AUTO: 1 %
BILIRUB SERPL-MCNC: 0.5 MG/DL
BUN SERPL-MCNC: 35.3 MG/DL (ref 6–20)
CALCIUM SERPL-MCNC: 9.4 MG/DL (ref 8.6–10)
CHLORIDE SERPL-SCNC: 105 MMOL/L (ref 98–107)
CREAT SERPL-MCNC: 1.24 MG/DL (ref 0.51–0.95)
DEPRECATED HCO3 PLAS-SCNC: 22 MMOL/L (ref 22–29)
EGFRCR SERPLBLD CKD-EPI 2021: 60 ML/MIN/1.73M2
EOSINOPHIL # BLD AUTO: 0.1 10E3/UL (ref 0–0.7)
EOSINOPHIL NFR BLD AUTO: 1 %
ERYTHROCYTE [DISTWIDTH] IN BLOOD BY AUTOMATED COUNT: 13.2 % (ref 10–15)
GLUCOSE SERPL-MCNC: 113 MG/DL (ref 70–99)
HCT VFR BLD AUTO: 33.7 % (ref 35–47)
HGB BLD-MCNC: 11.2 G/DL (ref 11.7–15.7)
IMM GRANULOCYTES # BLD: 0 10E3/UL
IMM GRANULOCYTES NFR BLD: 0 %
LYMPHOCYTES # BLD AUTO: 1.2 10E3/UL (ref 0.8–5.3)
LYMPHOCYTES NFR BLD AUTO: 13 %
MCH RBC QN AUTO: 30.4 PG (ref 26.5–33)
MCHC RBC AUTO-ENTMCNC: 33.2 G/DL (ref 31.5–36.5)
MCV RBC AUTO: 92 FL (ref 78–100)
MONOCYTES # BLD AUTO: 0.5 10E3/UL (ref 0–1.3)
MONOCYTES NFR BLD AUTO: 6 %
NEUTROPHILS # BLD AUTO: 7 10E3/UL (ref 1.6–8.3)
NEUTROPHILS NFR BLD AUTO: 79 %
NRBC # BLD AUTO: 0 10E3/UL
NRBC BLD AUTO-RTO: 0 /100
PLATELET # BLD AUTO: 197 10E3/UL (ref 150–450)
POTASSIUM SERPL-SCNC: 4 MMOL/L (ref 3.4–5.3)
PROT SERPL-MCNC: 7.2 G/DL (ref 6.4–8.3)
RBC # BLD AUTO: 3.68 10E6/UL (ref 3.8–5.2)
SODIUM SERPL-SCNC: 138 MMOL/L (ref 136–145)
WBC # BLD AUTO: 8.8 10E3/UL (ref 4–11)

## 2023-09-08 PROCEDURE — 76705 ECHO EXAM OF ABDOMEN: CPT

## 2023-09-08 PROCEDURE — 99285 EMERGENCY DEPT VISIT HI MDM: CPT | Mod: 25 | Performed by: EMERGENCY MEDICINE

## 2023-09-08 PROCEDURE — 85025 COMPLETE CBC W/AUTO DIFF WBC: CPT | Performed by: EMERGENCY MEDICINE

## 2023-09-08 PROCEDURE — 99285 EMERGENCY DEPT VISIT HI MDM: CPT | Mod: 25

## 2023-09-08 PROCEDURE — 80053 COMPREHEN METABOLIC PANEL: CPT | Performed by: EMERGENCY MEDICINE

## 2023-09-08 PROCEDURE — 250N000013 HC RX MED GY IP 250 OP 250 PS 637: Performed by: STUDENT IN AN ORGANIZED HEALTH CARE EDUCATION/TRAINING PROGRAM

## 2023-09-08 PROCEDURE — 36415 COLL VENOUS BLD VENIPUNCTURE: CPT | Performed by: EMERGENCY MEDICINE

## 2023-09-08 PROCEDURE — 999N000248 HC STATISTIC IV INSERT WITH US BY RN

## 2023-09-08 PROCEDURE — 250N000013 HC RX MED GY IP 250 OP 250 PS 637: Performed by: EMERGENCY MEDICINE

## 2023-09-08 PROCEDURE — G0378 HOSPITAL OBSERVATION PER HR: HCPCS

## 2023-09-08 PROCEDURE — 76705 ECHO EXAM OF ABDOMEN: CPT | Mod: 26 | Performed by: RADIOLOGY

## 2023-09-08 PROCEDURE — 76705 ECHO EXAM OF ABDOMEN: CPT | Mod: 26 | Performed by: EMERGENCY MEDICINE

## 2023-09-08 RX ORDER — SIMETHICONE 125 MG
125 TABLET,CHEWABLE ORAL DAILY PRN
COMMUNITY

## 2023-09-08 RX ORDER — ACETAMINOPHEN 500 MG
1000 TABLET ORAL ONCE
Status: COMPLETED | OUTPATIENT
Start: 2023-09-08 | End: 2023-09-08

## 2023-09-08 RX ORDER — VANCOMYCIN HYDROCHLORIDE 125 MG/1
125 CAPSULE ORAL 4 TIMES DAILY
Status: DISCONTINUED | OUTPATIENT
Start: 2023-09-08 | End: 2023-09-08 | Stop reason: HOSPADM

## 2023-09-08 RX ORDER — LEVOTHYROXINE SODIUM 25 UG/1
25 TABLET ORAL DAILY
Status: DISCONTINUED | OUTPATIENT
Start: 2023-09-08 | End: 2023-09-08 | Stop reason: HOSPADM

## 2023-09-08 RX ORDER — PROCHLORPERAZINE 25 MG
25 SUPPOSITORY, RECTAL RECTAL EVERY 12 HOURS PRN
Status: DISCONTINUED | OUTPATIENT
Start: 2023-09-08 | End: 2023-09-08 | Stop reason: HOSPADM

## 2023-09-08 RX ORDER — MAGNESIUM OXIDE 400 MG/1
800 TABLET ORAL EVERY MORNING
Status: DISCONTINUED | OUTPATIENT
Start: 2023-09-08 | End: 2023-09-08 | Stop reason: HOSPADM

## 2023-09-08 RX ORDER — ACETAMINOPHEN 325 MG/1
975 TABLET ORAL EVERY 8 HOURS
Status: DISCONTINUED | OUTPATIENT
Start: 2023-09-08 | End: 2023-09-08 | Stop reason: HOSPADM

## 2023-09-08 RX ORDER — ONDANSETRON 2 MG/ML
4 INJECTION INTRAMUSCULAR; INTRAVENOUS EVERY 6 HOURS PRN
Status: DISCONTINUED | OUTPATIENT
Start: 2023-09-08 | End: 2023-09-08 | Stop reason: HOSPADM

## 2023-09-08 RX ORDER — PROCHLORPERAZINE MALEATE 10 MG
10 TABLET ORAL EVERY 6 HOURS PRN
Status: DISCONTINUED | OUTPATIENT
Start: 2023-09-08 | End: 2023-09-08 | Stop reason: HOSPADM

## 2023-09-08 RX ORDER — INDOCYANINE GREEN AND WATER 25 MG
2.5 KIT INJECTION ONCE
Status: CANCELLED | OUTPATIENT
Start: 2023-09-08 | End: 2023-09-08

## 2023-09-08 RX ORDER — TACROLIMUS 1 MG/1
4 CAPSULE ORAL 2 TIMES DAILY
Status: DISCONTINUED | OUTPATIENT
Start: 2023-09-08 | End: 2023-09-08 | Stop reason: HOSPADM

## 2023-09-08 RX ORDER — VANCOMYCIN HYDROCHLORIDE 125 MG/1
CAPSULE ORAL
Qty: 22 CAPSULE | Refills: 0 | Status: SHIPPED | OUTPATIENT
Start: 2023-09-08 | End: 2023-09-17

## 2023-09-08 RX ORDER — SODIUM BICARBONATE 650 MG/1
1300 TABLET ORAL 3 TIMES DAILY
Status: DISCONTINUED | OUTPATIENT
Start: 2023-09-08 | End: 2023-09-08 | Stop reason: HOSPADM

## 2023-09-08 RX ORDER — ONDANSETRON 4 MG/1
4 TABLET, ORALLY DISINTEGRATING ORAL EVERY 6 HOURS PRN
Status: DISCONTINUED | OUTPATIENT
Start: 2023-09-08 | End: 2023-09-08 | Stop reason: HOSPADM

## 2023-09-08 RX ADMIN — VANCOMYCIN HYDROCHLORIDE 125 MG: 125 CAPSULE ORAL at 13:02

## 2023-09-08 RX ADMIN — ACETAMINOPHEN 1000 MG: 500 TABLET ORAL at 05:53

## 2023-09-08 RX ADMIN — VANCOMYCIN HYDROCHLORIDE 125 MG: 125 CAPSULE ORAL at 15:54

## 2023-09-08 ASSESSMENT — ACTIVITIES OF DAILY LIVING (ADL)
ADLS_ACUITY_SCORE: 35
ADLS_ACUITY_SCORE: 33

## 2023-09-08 NOTE — MEDICATION SCRIBE - ADMISSION MEDICATION HISTORY
Medication Scribe Admission Medication History    Admission medication history is complete. The information provided in this note is only as accurate as the sources available at the time of the update.    Medication reconciliation/reorder completed by provider prior to medication history? Yes    Information Source(s): Patient via in-person    Pertinent Information: Reviewed medication history with patient. She states she has not needed to take Veltessa for over a month. She is taking Sodium Bicarbonate 3 tablets twice daily. She has two days left of her Vancomycin 125mg QID. She never started Levaquin 500mg. She thought she had a UTI but it resolved so she never started medication. Patient states she was taking Nifedipine while pregnant but she since she is not pregnant anymore, she isn't taking it. She states she is unsure if she is supposed to restart this medication.     Changes made to PTA medication list:  Added: Simethicone  Deleted: Nifedipine, Cyanocobalamin, Hydrocortisone  Changed: Magnesium Oxide, Sodium Bicarbonate    Medication Affordability:  Not including over the counter (OTC) medications, was there a time in the past 3 months when you did not take your medications as prescribed because of cost?: No    Allergies reviewed with patient and updates made in EHR: yes    Medication History Completed By: Ирина FANG Her 9/8/2023 10:17 AM    Prior to Admission medications    Medication Sig Last Dose Taking? Auth Provider Long Term End Date   acetaminophen (TYLENOL) 325 MG tablet Take 2 tablets (650 mg) by mouth every 4 hours as needed for mild pain 9/7/2023 at prn Yes Macarena Bowen MD No    calcium carbonate 1250 MG/5ML SUSP suspension Take 2.5 mLs (625 mg) by mouth daily Past Month at prn Yes aKtrin Lopez MD No    fluticasone (FLONASE) 50 MCG/ACT nasal spray Spray 1 spray into both nostrils daily Past Month at prn Yes Macarena Bowen MD     lactase (LACTAID) 3000 UNIT tablet Take 3,000  Units by mouth 3 times daily (with meals) Past Week at prn Yes Reported, Patient     levothyroxine (SYNTHROID/LEVOTHROID) 25 MCG tablet Take 1 tablet (25 mcg) by mouth daily 9/8/2023 at 0700 Yes Jewell Winters MD Yes    magnesium oxide (MAG-OX) 400 MG tablet Take 400 mg by mouth every morning 9/8/2023 at 1000 Yes Gelacio Mcintyre MD No    mycophenolic acid (GENERIC EQUIVALENT) 180 MG EC tablet Take 3 tablets (540 mg) by mouth 2 times daily 9/8/2023 at 1000 Yes Gelacio Mcintyre MD No    patiromer (VELTASSA) 8.4 g packet Take 8.4 g by mouth daily as needed (as directed by your transplant team, for potassium = or > 5.5) Past Month Yes Nakul Hill MD     polyethylene glycol (MIRALAX) 17 GM/Dose powder Take 17 g (1 capful) by mouth daily as needed for constipation 9/8/2023 Yes Brian Kohli MD     simethicone (MYLICON) 125 MG chewable tablet Take 125 mg by mouth daily 9/8/2023 at 1000 Yes Reported, Patient No    sodium bicarbonate 650 MG tablet Take 2 tablets (1,300 mg) by mouth 3 times daily for 90 days  Patient taking differently: Take 1,950 mg by mouth 2 times daily 9/8/2023 at 1000 Yes Gelacio Mcintyre MD  10/22/23   tacrolimus (GENERIC EQUIVALENT) 1 MG capsule Take 4 capsules (4 mg) by mouth 2 times daily 9/8/2023 at 1000 Yes Gelacio Mcintyre MD No    vancomycin (VANCOCIN) 125 MG capsule Take 1 capsule (125 mg) by mouth 4 times daily for 14 days 9/8/2023 at 1000 Yes Gelacio Mcintyre MD  9/8/23   vitamin D3 (CHOLECALCIFEROL) 50 mcg (2000 units) tablet Take 1 tablet (50 mcg) by mouth daily for 90 days  Patient taking differently: Take 1 tablet by mouth every morning 9/8/2023 at 1000 Yes Gelacio Mcintyre MD  10/22/23

## 2023-09-08 NOTE — PROGRESS NOTES
Discharge instructions reviewed, understood, and signed by patient. VSS, PIV removed, new medications reviewed and understood, paper script with pt, patient has all belongings. Patient left unit via self to lobby.

## 2023-09-08 NOTE — ED TRIAGE NOTES
Patient arrives to ED with CC of right upper abdominal pain since yesterday and patient states tonight pain wrapped around her belly and felt tight. +nausea during intermittent pain  Hx of kidney transplant patient  and hx of cdifff. Patient states she has 2 more days of abx.  Gave birth 3 weeks ago.

## 2023-09-08 NOTE — CONSULTS
[facility]    Consult Note - EGS Service  Date of Admission:  9/8/2023  Consult Requested by: ED staff  Reason for Consult: Early acute cholecystitis    Assessment & Plan: Surgery   Mona Wagner is a 32 year old female admitted on 9/8/2023. She presents with four intermittent episodes of RUQ pain over the past 24 hours in the past 24 hours with imaging demonstrating cholelithiasis as well as RUQ pain on exam. Suspect patient has early acute cholecystitis, and would recommend performing a laparoscopic cholecystectomy. Of note, pt has mild dilation of CBD of 7mm which is the upper limit of normal for her age. No evidence of cholestatic or hepatocellular injury on labs. WBC wnl.     - will consent patient for lap shital   - keep NPO w/ mIVFs  - continue PO vancomycin for recent C diff       Drains: PRESENT          - May have additional drains, review Avatar  Code Status:      Clinically Significant Risk Factors Present on Admission                       # Overweight: Estimated body mass index is 29.88 kg/m  as calculated from the following:    Height as of this encounter: 1.524 m (5').    Weight as of this encounter: 69.4 kg (153 lb).            The patient's care was discussed with staff.     Ray Mccord MD  [facility]  Non-urgent messages: Securely message with [employer] (more info)  Text page via [employer] Paging/Directory     ______________________________________________________________________    Chief Complaint   RUQ pain    History is obtained from the patient    History of Present Illness   Mona Wagner is a 32 year old female who is 3 weeks post partum with PMHx of ESRD 2/2 IgA nephropathy s/p DDKT (2019),  R tx renal hydronephrosis 2/2 to uterine compression s/p PNT, and hypothyroidism who presents with acute intermittent RUQ pain.     Patient states that RUQ pain started yesterday afternoon. She states that the RUQ  pain is intermittent and last usually about 10-15 minutes. She says that it has radiated to her back as well. She denies any exacerbating or mitigating factors. She states that she has dull right sided back pain. She endorses nausea but denies having any emesis. She denies having any fevers or chills. She states that she recently was diagnosed with C diff after receiving a course of antibiotics for presumed pyelonephritis. She has a percutaneous nephrostomy tube that has been in place since 6/2023 after having transplant right hydronephrosis in the setting of uterine compression.     Past Medical History    Past Medical History:   Diagnosis Date    Anemia in chronic kidney disease     Bacteremia 07/05/2023    Difficult intubation     see 4/28/23 anesthesia notes    History of bacterial endocarditis     History of blood transfusion     History of DVT (deep vein thrombosis) 2014    History of seizure 2014    History of subarachnoid hemorrhage     Hydronephrosis     Hypothyroidism     Kidney transplanted 08/03/2019    DCD DDKT. Induction with thymo 6mg/kg.    Orthostatic hypotension     FATOUMATA (obstructive sleep apnea)     Pyelonephritis of transplanted kidney 01/23/2020    Secondary renal hyperparathyroidism (H)     Urinary tract infection      Past Surgical History   Past Surgical History:   Procedure Laterality Date    BENCH KIDNEY N/A 8/3/2019    Procedure: BACKBENCH PREPARATION, ALLOGRAFT, KIDNEY;  Surgeon: Dorian Johnson MD;  Location: [facility] OR    COMBINED CYSTOSCOPY, RETROGRADES, EXCHANGE STENT URETER(S) Right 11/23/2020    Procedure: CYSTOSCOPY, CYSOTGRAM, WITH RETROGRADE PYELOGRAM, ureteroscopy,  AND URETERAL STENT;  Surgeon: Wally Britt MD;  Location: [facility] OR    COMBINED CYSTOSCOPY, RETROGRADES, URETEROSCOPY, LASER HOLMIUM LITHOTRIPSY URETER(S), INSERT STENT N/A 12/6/2019    Procedure: CYSTOURETEROSCOPY, WITH RETROGRADE PYELOGRAM of transplant kidney, STENT INSERTION, Laser on standby;  Surgeon:  Wally Britt MD;  Location: [facility] OR    CREATE FISTULA ARTERIOVENOUS UPPER EXTREMITY  2014    Procedure: CREATE FISTULA ARTERIOVENOUS UPPER EXTREMITY;  Left Wrist Arteriovenous Fistula Placement;  Surgeon: Shashi Castro MD;  Location: [facility] OR    CREATE FISTULA ARTERIOVENOUS UPPER EXTREMITY      CYSTOSCOPY, RETROGRADES, INSERT STENT URETER(S), COMBINED N/A 2020    Procedure: CYSTOSCOPY, WITH RETROGRADE PYELOGRAM, CYSTOGRAM AND URETERAL STENT INSERTION - TRANSPLANT KIDNEY;  Surgeon: Wally Britt MD;  Location: [facility] OR    IR CEREBRAL ANGIOGRAM  2014    IR NEPHROSTOMY TUBE CHANGE RIGHT  2023    IR NEPHROSTOMY TUBE PLACEMENT RIGHT  2023    PARATHYROIDECTOMY Bilateral 2023    Procedure: Bilateral Resection of 3 and 1/2 parathyroid glands;  Surgeon: Melissa Jones MD;  Location: [facility] OR    PERCUTANEOUS BIOPSY KIDNEY Right 2019    Procedure: Right Kidney Biopsy;  Surgeon: Deny Rios MD;  Location: [facility] OR    RASTA/DIALYSIS CATHETER  12/10/2013         TRANSPLANT KIDNEY RECIPIENT  DONOR N/A 8/3/2019    Procedure: TRANSPLANT, KIDNEY, RECIPIENT,  DONOR with Ureteral Stent Placement;  Surgeon: Dorian Johnson MD;  Location: [facility] OR     Prior to Admission Medications   Current Facility-Administered Medications   Medication    acetaminophen (TYLENOL) tablet 975 mg    levothyroxine (SYNTHROID/LEVOTHROID) tablet 25 mcg    magnesium oxide (MAG-OX) tablet 800 mg    mycophenolic acid (GENERIC EQUIVALENT) EC tablet 540 mg    ondansetron (ZOFRAN ODT) ODT tab 4 mg    Or    ondansetron (ZOFRAN) injection 4 mg    prochlorperazine (COMPAZINE) injection 10 mg    Or    prochlorperazine (COMPAZINE) tablet 10 mg    Or    prochlorperazine (COMPAZINE) suppository 25 mg    sodium bicarbonate tablet 1,300 mg    tacrolimus (GENERIC EQUIVALENT) capsule 4 mg    vancomycin (VANCOCIN) capsule 125 mg     Current Outpatient Medications   Medication Sig    acetaminophen (TYLENOL)  325 MG tablet Take 2 tablets (650 mg) by mouth every 4 hours as needed for mild pain    calcium carbonate 1250 MG/5ML SUSP suspension Take 2.5 mLs (625 mg) by mouth daily    cyanocobalamin (VITAMIN B-12) 1000 MCG tablet Take 1 tablet (1,000 mcg) by mouth daily (Patient not taking: Reported on 8/18/2023)    fluticasone (FLONASE) 50 MCG/ACT nasal spray Spray 1 spray into both nostrils daily    hydrocortisone 2.5 % cream Apply topically 2 times daily    lactase (LACTAID) 3000 UNIT tablet Take 3,000 Units by mouth 3 times daily (with meals)    levofloxacin (LEVAQUIN) 500 MG tablet Take 1 tablet (500 mg) by mouth daily    levothyroxine (SYNTHROID/LEVOTHROID) 25 MCG tablet Take 1 tablet (25 mcg) by mouth daily    magnesium oxide (MAG-OX) 400 MG tablet Take 800 mg by mouth every morning    mycophenolic acid (GENERIC EQUIVALENT) 180 MG EC tablet Take 3 tablets (540 mg) by mouth 2 times daily    NIFEdipine ER (ADALAT CC) 30 MG 24 hr tablet Take 1 tablet (30 mg) by mouth daily Start taking one tablet if blood pressure greater than 130/80.    patiromer (VELTASSA) 8.4 g packet Take 8.4 g by mouth daily as needed (as directed by your transplant team, for potassium = or > 5.5)    polyethylene glycol (MIRALAX) 17 GM/Dose powder Take 17 g (1 capful) by mouth daily as needed for constipation    sodium bicarbonate 650 MG tablet Take 2 tablets (1,300 mg) by mouth 3 times daily for 90 days    tacrolimus (GENERIC EQUIVALENT) 1 MG capsule Take 4 capsules (4 mg) by mouth 2 times daily    [START ON 9/9/2023] vancomycin (VANCOCIN) 125 MG capsule Take 1 capsule (125 mg) by mouth 2 times daily    vancomycin (VANCOCIN) 125 MG capsule Take 1 capsule (125 mg) by mouth 4 times daily for 14 days    vitamin D3 (CHOLECALCIFEROL) 50 mcg (2000 units) tablet Take 1 tablet (50 mcg) by mouth daily for 90 days (Patient taking differently: Take 1 tablet by mouth every morning)        Physical Exam   Vital Signs: Temp: 98  F (36.7  C) Temp src: Oral BP:  127/89 Pulse: 67   Resp: 18 SpO2: 96 % O2 Device: None (Room air)    Weight: 153 lbs 0 ozNo intake or output data in the 24 hours ending 09/08/23 0933    GENERAL: NAD, resting comfortably in bed  HEENT: atraumatic, normocephalic, no scleral icterus  CARDIO: normal rate and regular rhythm, no LE edema  PULM: no increased WOB  ABD: non-distended, soft, and mild RUQ tenderness  NEURO: CN II-XII grossly intact, no focal neuro deficits  PSYCH: appropriate affect and behavior    Data     I have personally reviewed the following data over the past 24 hrs:    8.8  \   11.2 (L)   / 197     138 105 35.3 (H) /  113 (H)   4.0 22 1.24 (H) \     ALT: 15 AST: 19 AP: 79 TBILI: 0.5   ALB: 4.1 TOT PROTEIN: 7.2 LIPASE: N/A       Imaging results reviewed over the past 24 hrs:   Recent Results (from the past 24 hour(s))   Abdomen US, limited (RUQ only)    Narrative    EXAMINATION: US ABDOMEN LIMITED, 9/8/2023 5:39 AM     INDICATION: RUQ pain radiating to back eval for acute shital, bedside  u/s with focal areas of wall thickening, cholelithiasis, positive sono  Malcolm.    COMPARISON: 9 May 2023    TECHNIQUE: The abdomen was scanned in the standard fashion with  specialized ultrasound transducer(s) using both gray scale and limited  color/spectral Doppler techniques.    FINDINGS:   Fluid: No evidence of ascites or pleural effusions.    Liver: The liver demonstrates normal echotexture, measuring 15.8 cm in  craniocaudal dimension. No focal hepatic mass. No intrahepatic biliary  dilatation. The main portal vein is patent with antegrade flow.    Gallbladder: The gallbladder is well distended and of normal  morphology. There is significant gallbladder wall thickening measuring  up to 6 mm in diameter.  Calcified stone is also present within the  lumen. Sonographic Malcolm sign is negative.    Bile Ducts: No intrahepatic biliary dilation. The common bile duct  measures 7 mm in diameter.    Pancreas: Visualized portions of the pancreas are  unremarkable.     Kidney: The right kidney measures 7.7 cm in long dimension. No  hydronephrosis, hydroureter, shadowing renal calculi, or solid mass.  The capsule and parenchyma demonstrate normal echogenicity.    Aorta and IVC: The visualized portions of the aorta and IVC are  unremarkable.       Impression    IMPRESSION:     1. Cholelithiasis with nonspecific gallbladder wall thickening,  negative sonographic Malcolm sign, and absence of pericholecystic  fluid. Findings are indeterminate, that can be related to overall  third spacing of fluid. If there is ongoing clinical concern for acute  cholecystitis, could consider further evaluation with nuclear medicine  HIDA study.  2. Mild dilation of the common bile duct without evidence of  obstruction. However, the entirety of the common bile duct is not  visualized on today's exam. If there is clinical concern for  choledocholithiasis recommend dedicated cholangiography for further  evaluation.    I have personally reviewed the examination and initial interpretation  and I agree with the findings.    KASIE MORIN MD         SYSTEM ID:  [id number]

## 2023-09-08 NOTE — PROVIDER NOTIFICATION
Paged General Surgery:    Pt has tolerated lunch and dinner with no further abd pain, no tylenol since before 0600. She is hoping to discharge.

## 2023-09-08 NOTE — ED PROVIDER NOTES
ED Provider Note  September 8, 2023  [facility]      History     Chief Complaint: Abdominal Pain      HPI  Mona Wagner is a 32 year old female hx of renal xplant, dvt, presenting to the ED with c/o RUQ abdominal pain since yesterday evening spastic in nature.. She states today the pain radiates around her abdomen into her flank and right upper back and feels tight. Accompanied by nausea.  Symptoms lasted intermittently for several hours and have now resolved.  While in triage was essentially asymptomatic but then at conclusion of interview felt that her right upper quadrant pain was returning slightly    No fevers or chills no chest pain or shortness of breath.      She is 3 weeks postpartum and is currently on levofloxacin. S/p kidney transplant 8/19. History of cdiff, hypomagnesemia, immunosuppressed status.        Past Medical History  Past Medical History:   Diagnosis Date    Anemia in chronic kidney disease     Bacteremia 07/05/2023    Difficult intubation     see 4/28/23 anesthesia notes    History of bacterial endocarditis     History of blood transfusion     History of DVT (deep vein thrombosis) 2014    History of seizure 2014    History of subarachnoid hemorrhage     Hydronephrosis     Hypothyroidism     Kidney transplanted 08/03/2019    DCD DDKT. Induction with thymo 6mg/kg.    Orthostatic hypotension     FATOUMATA (obstructive sleep apnea)     Pyelonephritis of transplanted kidney 01/23/2020    Secondary renal hyperparathyroidism (H)     Urinary tract infection      Past Surgical History:   Procedure Laterality Date    BENCH KIDNEY N/A 8/3/2019    Procedure: BACKBENCH PREPARATION, ALLOGRAFT, KIDNEY;  Surgeon: Dorian Johnson MD;  Location: [facility] OR    COMBINED CYSTOSCOPY, RETROGRADES, EXCHANGE STENT URETER(S) Right 11/23/2020    Procedure: CYSTOSCOPY, CYSOTGRAM, WITH RETROGRADE PYELOGRAM, ureteroscopy,  AND URETERAL STENT;  Surgeon: Wally Britt MD;  Location: [facility] OR     COMBINED CYSTOSCOPY, RETROGRADES, URETEROSCOPY, LASER HOLMIUM LITHOTRIPSY URETER(S), INSERT STENT N/A 2019    Procedure: CYSTOURETEROSCOPY, WITH RETROGRADE PYELOGRAM of transplant kidney, STENT INSERTION, Laser on standby;  Surgeon: Wally Britt MD;  Location: [facility] OR    CREATE FISTULA ARTERIOVENOUS UPPER EXTREMITY  2014    Procedure: CREATE FISTULA ARTERIOVENOUS UPPER EXTREMITY;  Left Wrist Arteriovenous Fistula Placement;  Surgeon: Shashi Castro MD;  Location: [facility] OR    CREATE FISTULA ARTERIOVENOUS UPPER EXTREMITY      CYSTOSCOPY, RETROGRADES, INSERT STENT URETER(S), COMBINED N/A 2020    Procedure: CYSTOSCOPY, WITH RETROGRADE PYELOGRAM, CYSTOGRAM AND URETERAL STENT INSERTION - TRANSPLANT KIDNEY;  Surgeon: Wally Britt MD;  Location: [facility] OR    IR CEREBRAL ANGIOGRAM  2014    IR NEPHROSTOMY TUBE CHANGE RIGHT  2023    IR NEPHROSTOMY TUBE PLACEMENT RIGHT  2023    PARATHYROIDECTOMY Bilateral 2023    Procedure: Bilateral Resection of 3 and 1/2 parathyroid glands;  Surgeon: Melissa Jones MD;  Location: [facility] OR    PERCUTANEOUS BIOPSY KIDNEY Right 2019    Procedure: Right Kidney Biopsy;  Surgeon: Deny Rios MD;  Location: [facility]    RASTA/DIALYSIS CATHETER  12/10/2013         TRANSPLANT KIDNEY RECIPIENT  DONOR N/A 8/3/2019    Procedure: TRANSPLANT, KIDNEY, RECIPIENT,  DONOR with Ureteral Stent Placement;  Surgeon: Dorian Johnson MD;  Location: [facility] OR     acetaminophen (TYLENOL) 325 MG tablet  calcium carbonate 1250 MG/5ML SUSP suspension  fluticasone (FLONASE) 50 MCG/ACT nasal spray  lactase (LACTAID) 3000 UNIT tablet  levothyroxine (SYNTHROID/LEVOTHROID) 25 MCG tablet  mycophenolic acid (GENERIC EQUIVALENT) 180 MG EC tablet  patiromer (VELTASSA) 8.4 g packet  polyethylene glycol (MIRALAX) 17 GM/Dose powder  simethicone (MYLICON) 125 MG chewable tablet  sodium bicarbonate 650 MG tablet  vitamin D3 (CHOLECALCIFEROL) 50 mcg (2000 units)  tablet  ciprofloxacin (CIPRO) 500 MG tablet  magnesium oxide (MAG-OX) 400 MG tablet  nitroFURantoin macrocrystal-monohydrate (MACROBID) 100 MG capsule  norethindrone (MICRONOR) 0.35 MG tablet  tacrolimus (GENERIC EQUIVALENT) 1 MG capsule  vancomycin (VANCOCIN) 125 MG capsule      Allergies   Allergen Reactions    Contrast Dye Rash     CT contrast allergy 12/14/19 rash over eyes. Need to have pre medication before a CT WITH CONTRAST     Diatrizoate Rash     CT contrast allergy 12/14/19 rash over eyes. Need to have pre medication before a CT WITH CONTRAST     Lisinopril Swelling     angioedema    Nitrous Oxide Other (See Comments)     Sense of doom    Chlorhexidine Rash     Rash at site    Sulfa Antibiotics Rash     Muscle stiffness of neck    Dapsone      Methemoglobinemia    Furosemide Other (See Comments)     Skin flushing    Metrogel [Metronidazole]      Hives diffusely on body after vaginal administration.    Azithromycin Dizziness and Rash    Cefuroxime Rash     Family History  Family History   Problem Relation Age of Onset    Kidney Disease Mother     Kidney failure Mother     Coronary Artery Disease Father     Heart Disease Father     Sleep Apnea Father     Neurologic Disorder Father         corticobasal degeneration    Parkinsonism Father     Hypertension Sister     Diabetes Sister     Cerebrovascular Disease Sister     Kidney Disease Sister     Breast Cancer No family hx of     Cancer - colorectal No family hx of     Ovarian Cancer No family hx of     Prostate Cancer No family hx of     Other Cancer No family hx of     Asthma No family hx of     Anesthesia Reaction No family hx of     Deep Vein Thrombosis (DVT) No family hx of     Melanoma No family hx of     Skin Cancer No family hx of     Thrombosis No family hx of      Social History   Social History     Tobacco Use    Smoking status: Never    Smokeless tobacco: Never   Vaping Use    Vaping Use: Never used   Substance Use Topics    Alcohol use: Never     Drug use: Never        Past medical history, past surgical history, medications, allergies, family history, and social history were reviewed with the patient. No additional pertinent items.      A medically appropriate review of systems was performed with pertinent positives and negatives noted in the HPI, and all other systems negative.    Physical Exam   /79 (BP Location: Right arm)   Pulse 82   Temp 98.4  F (36.9  C) (Oral)   Resp 16   Ht 1.524 m (5')   Wt 69.4 kg (153 lb)   LMP 12/16/2022   SpO2 97%   BMI 29.88 kg/m      GEN: Well appearing, non toxic, cooperative and conversant.   HEENT: The head is normocephalic and atraumatic. Pupils are equal round and reactive to light. Extraocular motions are intact. There is no facial swelling.   CV: Regular rate   PULM: Unlabored breathing   Abdomen: Soft, mild right upper quadrant tenderness to palpation, nondistended  Back: No CVA tenderness to palpation.  EXT: Full range of motion.  No edema.  NEURO: Cranial nerves II through XII are intact and symmetric. Bilateral upper and lower extremities grossly show full range of motion without any focal deficits.     PSYCH: Calm and cooperative, interactive.       ED Course, Procedures, & Data      Procedures  Results for orders placed during the hospital encounter of 09/08/23    POC US ABDOMEN LIMITED    Impression  Bedside RUQ ultrasound,  performed and interpreted by me.    Indication: ruq pain    The gall bladder (GB) was evaluated along the short and long axis in real time.    No GB dilatation. The anterior GB wall measures 3-4mm in focal areas   There are multiple GB stones visible, a small stone present in the neck, but appears to be mobile.  There is scant sludge.  The common bile duct was visualized and measures 7 mm in luminal diameter.  Sonographic tavera sign is positive    Impression: suspect early cholecystitis                    Results for orders placed or performed during the hospital encounter of  09/08/23   POC US ABDOMEN LIMITED     Status: None    Impression    Bedside RUQ ultrasound,  performed and interpreted by me.     Indication: ruq pain     The gall bladder (GB) was evaluated along the short and long axis in real time.      No GB dilatation. The anterior GB wall measures 3-4mm in focal areas   There are multiple GB stones visible, a small stone present in the neck, but appears to be mobile.  There is scant sludge.  The common bile duct was visualized and measures 7 mm in luminal diameter.  Sonographic malcolm sign is positive    Impression: suspect early cholecystitis      Abdomen US, limited (RUQ only)     Status: None    Narrative    EXAMINATION: US ABDOMEN LIMITED, 9/8/2023 5:39 AM     INDICATION: RUQ pain radiating to back eval for acute shital, bedside  u/s with focal areas of wall thickening, cholelithiasis, positive sono  Malcolm.    COMPARISON: 9 May 2023    TECHNIQUE: The abdomen was scanned in the standard fashion with  specialized ultrasound transducer(s) using both gray scale and limited  color/spectral Doppler techniques.    FINDINGS:   Fluid: No evidence of ascites or pleural effusions.    Liver: The liver demonstrates normal echotexture, measuring 15.8 cm in  craniocaudal dimension. No focal hepatic mass. No intrahepatic biliary  dilatation. The main portal vein is patent with antegrade flow.    Gallbladder: The gallbladder is well distended and of normal  morphology. There is significant gallbladder wall thickening measuring  up to 6 mm in diameter.  Calcified stone is also present within the  lumen. Sonographic Malcolm sign is negative.    Bile Ducts: No intrahepatic biliary dilation. The common bile duct  measures 7 mm in diameter.    Pancreas: Visualized portions of the pancreas are unremarkable.     Kidney: The right kidney measures 7.7 cm in long dimension. No  hydronephrosis, hydroureter, shadowing renal calculi, or solid mass.  The capsule and parenchyma demonstrate normal  echogenicity.    Aorta and IVC: The visualized portions of the aorta and IVC are  unremarkable.       Impression    IMPRESSION:     1. Cholelithiasis with nonspecific gallbladder wall thickening,  negative sonographic Malcolm sign, and absence of pericholecystic  fluid. Findings are indeterminate, that can be related to overall  third spacing of fluid. If there is ongoing clinical concern for acute  cholecystitis, could consider further evaluation with nuclear medicine  HIDA study.  2. Mild dilation of the common bile duct without evidence of  obstruction. However, the entirety of the common bile duct is not  visualized on today's exam. If there is clinical concern for  choledocholithiasis recommend dedicated cholangiography for further  evaluation.    I have personally reviewed the examination and initial interpretation  and I agree with the findings.    KASIE MORIN MD         SYSTEM ID:  [id number]   Comprehensive metabolic panel     Status: Abnormal   Result Value Ref Range    Sodium 138 136 - 145 mmol/L    Potassium 4.0 3.4 - 5.3 mmol/L    Chloride 105 98 - 107 mmol/L    Carbon Dioxide (CO2) 22 22 - 29 mmol/L    Anion Gap 11 7 - 15 mmol/L    Urea Nitrogen 35.3 (H) 6.0 - 20.0 mg/dL    Creatinine 1.24 (H) 0.51 - 0.95 mg/dL    Calcium 9.4 8.6 - 10.0 mg/dL    Glucose 113 (H) 70 - 99 mg/dL    Alkaline Phosphatase 79 35 - 104 U/L    AST 19 0 - 45 U/L    ALT 15 0 - 50 U/L    Protein Total 7.2 6.4 - 8.3 g/dL    Albumin 4.1 3.5 - 5.2 g/dL    Bilirubin Total 0.5 <=1.2 mg/dL    GFR Estimate 60 (L) >60 mL/min/1.73m2   CBC with platelets and differential     Status: Abnormal   Result Value Ref Range    WBC Count 8.8 4.0 - 11.0 10e3/uL    RBC Count 3.68 (L) 3.80 - 5.20 10e6/uL    Hemoglobin 11.2 (L) 11.7 - 15.7 g/dL    Hematocrit 33.7 (L) 35.0 - 47.0 %    MCV 92 78 - 100 fL    MCH 30.4 26.5 - 33.0 pg    MCHC 33.2 31.5 - 36.5 g/dL    RDW 13.2 10.0 - 15.0 %    Platelet Count 197 150 - 450 10e3/uL    % Neutrophils 79 %    %  Lymphocytes 13 %    % Monocytes 6 %    % Eosinophils 1 %    % Basophils 1 %    % Immature Granulocytes 0 %    NRBCs per 100 WBC 0 <1 /100    Absolute Neutrophils 7.0 1.6 - 8.3 10e3/uL    Absolute Lymphocytes 1.2 0.8 - 5.3 10e3/uL    Absolute Monocytes 0.5 0.0 - 1.3 10e3/uL    Absolute Eosinophils 0.1 0.0 - 0.7 10e3/uL    Absolute Basophils 0.0 0.0 - 0.2 10e3/uL    Absolute Immature Granulocytes 0.0 <=0.4 10e3/uL    Absolute NRBCs 0.0 10e3/uL   CBC with platelets differential     Status: Abnormal    Narrative    The following orders were created for panel order CBC with platelets differential.  Procedure                               Abnormality         Status                     ---------                               -----------         ------                     CBC with platelets and d...[[id number]]  Abnormal            Final result                 Please view results for these tests on the individual orders.     Medications   acetaminophen (TYLENOL) tablet 1,000 mg (1,000 mg Oral $Given 9/8/23 0553)     Labs Ordered and Resulted from Time of ED Arrival to Time of ED Departure   COMPREHENSIVE METABOLIC PANEL - Abnormal       Result Value    Sodium 138      Potassium 4.0      Chloride 105      Carbon Dioxide (CO2) 22      Anion Gap 11      Urea Nitrogen 35.3 (*)     Creatinine 1.24 (*)     Calcium 9.4      Glucose 113 (*)     Alkaline Phosphatase 79      AST 19      ALT 15      Protein Total 7.2      Albumin 4.1      Bilirubin Total 0.5      GFR Estimate 60 (*)    CBC WITH PLATELETS AND DIFFERENTIAL - Abnormal    WBC Count 8.8      RBC Count 3.68 (*)     Hemoglobin 11.2 (*)     Hematocrit 33.7 (*)     MCV 92      MCH 30.4      MCHC 33.2      RDW 13.2      Platelet Count 197      % Neutrophils 79      % Lymphocytes 13      % Monocytes 6      % Eosinophils 1      % Basophils 1      % Immature Granulocytes 0      NRBCs per 100 WBC 0      Absolute Neutrophils 7.0      Absolute Lymphocytes 1.2      Absolute Monocytes  0.5      Absolute Eosinophils 0.1      Absolute Basophils 0.0      Absolute Immature Granulocytes 0.0      Absolute NRBCs 0.0       Abdomen US, limited (RUQ only)   Final Result   IMPRESSION:       1. Cholelithiasis with nonspecific gallbladder wall thickening,   negative sonographic Malcolm sign, and absence of pericholecystic   fluid. Findings are indeterminate, that can be related to overall   third spacing of fluid. If there is ongoing clinical concern for acute   cholecystitis, could consider further evaluation with nuclear medicine   HIDA study.   2. Mild dilation of the common bile duct without evidence of   obstruction. However, the entirety of the common bile duct is not   visualized on today's exam. If there is clinical concern for   choledocholithiasis recommend dedicated cholangiography for further   evaluation.      I have personally reviewed the examination and initial interpretation   and I agree with the findings.      KASIE MORIN MD            SYSTEM ID:  [id number]      POC US ABDOMEN LIMITED   Final Result   Bedside RUQ ultrasound,  performed and interpreted by me.       Indication: ruq pain       The gall bladder (GB) was evaluated along the short and long axis in real time.        No GB dilatation. The anterior GB wall measures 3-4mm in focal areas   There are multiple GB stones visible, a small stone present in the neck, but appears to be mobile.  There is scant sludge.  The common bile duct was visualized and measures 7 mm in luminal diameter.  Sonographic malcolm sign is positive      Impression: suspect early cholecystitis                   Medical Decision Making Matrix    Problems Addressed   MDM High: 1 acute or chronic illness or injury that poses a threat to life or bodily function     Data   Considered   Data Extensive: Order of (or considering) each unique test (Cat 1), Review of the results of each unique test (Cat 1), Independent interpretation of a test performed by another  practitioner (Cat 2), and Discussion of management of test interpretation with external practitioner (Cat 3)     Risk of Patient Management                  Assessment & Plan    32-year-old female postpartum 3 weeks, status post renal transplant here with right upper quadrant pain    DDx is broad including help syndrome, biliary colic, a calculus cholecystitis, colitis, SBO, among other causes.    Clinically patient is well appearing and nontoxic  Laboratories notable for normal LFTs  Bedside biliary ultrasound shows possible biliary colic as she has TTP along GB (+ sono tavera) although this is improving during the exam. CBD dilated to 7mm    Limited evaluation of transplanted kidney shows normal index of resistance.    During serial eval , pts pain returned and seemed classic for GB pathology.   Formal US ordered and surgery consulted.     Pt SO to Dr. Maza             I have reviewed the nursing notes. I have reviewed the findings, diagnosis, plan and need for follow up with the patient.    Discharge Medication List as of 9/8/2023  5:48 PM        START taking these medications    Details   amoxicillin-clavulanate (AUGMENTIN) 875-125 MG tablet Take 1 tablet by mouth 2 times daily for 7 days, Disp-14 tablet, R-0, Local Print             Final diagnoses:   Acute cholecystitis       Walt Almaraz MD  [facility] EMERGENCY DEPARTMENT  September 8, 2023     Walt Almaraz MD  09/26/23 6729

## 2023-09-08 NOTE — PROGRESS NOTES
/88   Pulse 76   Temp 97.7  F (36.5  C) (Oral)   Resp 18   Ht 1.524 m (5')   Wt 69.4 kg (153 lb)   LMP 12/16/2022   SpO2 97%   BMI 29.88 kg/m        Observation goals:    -Diagnostic tests and consults completed and resulted- Met, US resulted  -Vital signs normal or at patient baseline- Met  -Tolerating oral intake to maintain hydration- Met, eating low fat diet and tolerating it well  -Adequate pain control on oral analgesics- Met, no pain with eating    Nurse to notify provider when observation goals have been met and patient is ready for discharge.    Plan for laparoscopic cholecystectomy tomorrow, however patient states if she does not have pain at dinner time when she eats then she will not proceed with the surgery tomorrow.

## 2023-09-08 NOTE — PLAN OF CARE
Goal Outcome Evaluation:    -Diagnostic tests and consults completed and resulted- Met  -Vital signs normal or at patient baseline- Met  -Tolerating oral intake to maintain hydration- Met  -Adequate pain control on oral analgesics- Met    Pt eating dinner, no pain or nausea reported.

## 2023-09-08 NOTE — TELEPHONE ENCOUNTER
Pt reported that her pharm is unable to get vanco until Monday or tueday. She was instructed to take vanco 4 times a day for 2 days then decrease to 2 times daily for 7 days. Refill sent to discharge pharmacy.

## 2023-09-08 NOTE — ED PROVIDER NOTES
Emergency Department Patient Sign-out       Brief HPI and ED course:  Patient is a 32 year old female signed out to me by the previous physician.  See initial ED Provider note for details of the presentation. In brief, 32-year-old female with a past medical history of renal transplant, currently about 3 weeks post partum presenting to the emergency department due to right upper quadrant pain radiating around her abdomen into her right upper flank associated with nausea.  Ultrasound equivocal for early cholecystitis.  Surgery consulted    Vitals:   Patient Vitals for the past 24 hrs:   BP Temp Temp src Pulse Resp SpO2 Height Weight   09/08/23 1013 -- 97.7  F (36.5  C) Oral -- -- 98 % -- --   09/08/23 1012 120/88 -- -- 76 -- -- -- --   09/08/23 0818 -- 98  F (36.7  C) Oral -- -- -- -- --   09/08/23 0730 -- -- -- -- -- 96 % -- --   09/08/23 0715 -- -- -- -- -- 96 % -- --   09/08/23 0700 -- -- -- -- -- 97 % -- --   09/08/23 0558 127/89 -- -- 67 -- 98 % -- --   09/08/23 0556 127/89 97.7  F (36.5  C) Oral 75 18 98 % -- --   09/08/23 0300 -- 97.7  F (36.5  C) Oral -- -- 98 % -- --   09/08/23 0259 123/87 -- -- 68 -- -- -- --   09/08/23 0027 117/73 97.7  F (36.5  C) -- 71 16 97 % 1.524 m (5') 69.4 kg (153 lb)       Received Sign-out Plan:    Pending:   -Surgery evaluation and disposition    Events after assuming care:  After care was assumed, a focused history and physical was performed. Agree with findings relayed by previous provider.     Surgery evaluated the patient.  At this point time, they believe she is a good candidate for laparoscopic cholecystectomy.  Patient is comfortable, but when I spoke with her was unaware of the plan that she may be undergoing surgery.  Surgery is supposed to come back and discuss this further with the patient, but they do want her to be admitted in observation status to their service.     --  Triny Maza MD   Emergency Medicine         Triny Maza MD  09/08/23 1141

## 2023-09-09 NOTE — DISCHARGE SUMMARY
[facility]  Surgery Discharge Summary      Date of Admission:  9/8/2023  Date of Discharge:  9/8/2023  6:08 PM  Discharging Provider: Ray Mccord MD  Discharge Service: EGS    Discharge Diagnoses   Symptomatic cholelithiasis/early acute cholecystitis    Follow-ups Needed After Discharge   Follow-up Appointments     Follow Up ([facility])      Follow up with primary care provider, Macarena Bowen, within 7   days for brief hospitalization.    Appointments on [place] and/or [facility] (with [facility] or [facility]   provider or service). Call [phone] if you haven't heard regarding   these appointments within 7 days of discharge.            Unresulted Labs Ordered in the Past 30 Days of this Admission       Date and Time Order Name Status Description    8/12/2023  2:46 AM Placenta path order and indications In process         These results will be followed up by Ob/Gyn    Discharge Disposition   Discharged to home  Condition at discharge: Stable    Hospital Course   Mona Wagner is a 32 year old female who is 3 weeks post partum with PMHx of ESRD 2/2 IgA nephropathy s/p DDKT (2019),  R tx renal hydronephrosis 2/2 to uterine compression s/p PNT, and hypothyroidism who presents with acute intermittent RUQ pain. Patient reported having four episodes of severe RUQ pain that would last for 15 minutes over the past 24 hours prior to presentation. On exam she was soft with mild RUQ tenderness. Imaging demonstrated cholelithiasis without GB wall thickening or pericholecystic fluid. We suspected that this might be early acute cholecystitis and consented the patient for a laparoscopic cholecystectomy for 9/9. Pt was allowed to eat and drink on 9/8 and reported that she was feeling well. She decided that she did not want to undergo surgery and was discharged with seven day course of augmentin as well as scheduled to have follow up with our team in a few weeks.      Consultations This Hospital Stay   NURSING TO CONSULT FOR VASCULAR ACCESS CARE IP CONSULT  NURSING TO CONSULT FOR VASCULAR ACCESS CARE IP CONSULT    Code Status   Prior      Ray Mccord MD  [facility] EMERGENCY DEPARTMENT  [address]  [facility] [address]  Phone: [phone]  ______________________________________________________________________    Physical Exam   Vital Signs: Temp: 98.4  F (36.9  C) Temp src: Oral BP: 121/79 Pulse: 82   Resp: 16 SpO2: 97 % O2 Device: None (Room air)    Weight: 153 lbs 0 oz    GENERAL: NAD, resting comfortably in bed  HEENT: atraumatic, normocephalic, no scleral icterus  CARDIO: normal rate and regular rhythm, no LE edema  PULM: no increased WOB  ABD: non-distended, soft, and mild RUQ tenderness  NEURO: CN II-XII grossly intact, no focal neuro deficits  PSYCH: appropriate affect and behavior    Primary Care Physician   Macarena Bowen    Discharge Orders      Reason for your hospital stay    Symptomatic cholelithiasis/biliary colic     Activity    Your activity upon discharge: activity as tolerated     Follow Up ([facility])    Follow up with primary care provider, Macarena Bowen, within 7 days for brief hospitalization.    Appointments on [place] and/or [facility] (with [facility] or [facility] provider or service). Call [phone] if you haven't heard regarding these appointments within 7 days of discharge.     Diet    Follow this diet upon discharge: Orders Placed This Encounter      Low Fat Diet (up to 50g)     Significant Results and Procedures   Results for orders placed or performed during the hospital encounter of 09/08/23   Abdomen US, limited (RUQ only)    Narrative    EXAMINATION: US ABDOMEN LIMITED, 9/8/2023 5:39 AM     INDICATION: RUQ pain radiating to back eval for acute shital, bedside  u/s with focal areas of wall thickening, cholelithiasis, positive sono  Malcolm.    COMPARISON: 9 May 2023    TECHNIQUE: The abdomen was scanned in  the standard fashion with  specialized ultrasound transducer(s) using both gray scale and limited  color/spectral Doppler techniques.    FINDINGS:   Fluid: No evidence of ascites or pleural effusions.    Liver: The liver demonstrates normal echotexture, measuring 15.8 cm in  craniocaudal dimension. No focal hepatic mass. No intrahepatic biliary  dilatation. The main portal vein is patent with antegrade flow.    Gallbladder: The gallbladder is well distended and of normal  morphology. There is significant gallbladder wall thickening measuring  up to 6 mm in diameter.  Calcified stone is also present within the  lumen. Sonographic Malcolm sign is negative.    Bile Ducts: No intrahepatic biliary dilation. The common bile duct  measures 7 mm in diameter.    Pancreas: Visualized portions of the pancreas are unremarkable.     Kidney: The right kidney measures 7.7 cm in long dimension. No  hydronephrosis, hydroureter, shadowing renal calculi, or solid mass.  The capsule and parenchyma demonstrate normal echogenicity.    Aorta and IVC: The visualized portions of the aorta and IVC are  unremarkable.       Impression    IMPRESSION:     1. Cholelithiasis with nonspecific gallbladder wall thickening,  negative sonographic Malcolm sign, and absence of pericholecystic  fluid. Findings are indeterminate, that can be related to overall  third spacing of fluid. If there is ongoing clinical concern for acute  cholecystitis, could consider further evaluation with nuclear medicine  HIDA study.  2. Mild dilation of the common bile duct without evidence of  obstruction. However, the entirety of the common bile duct is not  visualized on today's exam. If there is clinical concern for  choledocholithiasis recommend dedicated cholangiography for further  evaluation.    I have personally reviewed the examination and initial interpretation  and I agree with the findings.    KASIE MORIN MD         SYSTEM ID:  [id number]     *Note: Due to a  large number of results and/or encounters for the requested time period, some results have not been displayed. A complete set of results can be found in Results Review.     Discharge Medications   Discharge Medication List as of 9/8/2023  5:48 PM        START taking these medications    Details   amoxicillin-clavulanate (AUGMENTIN) 875-125 MG tablet Take 1 tablet by mouth 2 times daily for 7 days, Disp-14 tablet, R-0, Local Print           CONTINUE these medications which have NOT CHANGED    Details   acetaminophen (TYLENOL) 325 MG tablet Take 2 tablets (650 mg) by mouth every 4 hours as needed for mild pain, Disp-100 tablet, R-3, E-Prescribe      calcium carbonate 1250 MG/5ML SUSP suspension Take 2.5 mLs (625 mg) by mouth daily, Disp-500 mL, R-1, No Print Out      fluticasone (FLONASE) 50 MCG/ACT nasal spray Spray 1 spray into both nostrils daily, Disp-18.2 mL, R-1, E-Prescribe      lactase (LACTAID) 3000 UNIT tablet Take 3,000 Units by mouth 3 times daily (with meals), Historical      levothyroxine (SYNTHROID/LEVOTHROID) 25 MCG tablet Take 1 tablet (25 mcg) by mouth daily, Disp-30 tablet, R-1, E-Prescribe      magnesium oxide (MAG-OX) 400 MG tablet Take 400 mg by mouth every morning, Disp-90 tablet, R-1, Historical      mycophenolic acid (GENERIC EQUIVALENT) 180 MG EC tablet Take 3 tablets (540 mg) by mouth 2 times daily, Disp-180 tablet, R-11, E-PrescribeTXP DT 8/3/2019 (Kidney) TXP Dischg DT 8/12/2019 DX Kidney replaced by transplant Z94.0 TX [facility] ([place], [place])      patiromer (VELTASSA) 8.4 g packet Take 8.4 g by mouth daily as needed (as directed by your transplant team, for potassium = or > 5.5), Disp-5 packet, R-1, E-Prescribe      polyethylene glycol (MIRALAX) 17 GM/Dose powder Take 17 g (1 capful) by mouth daily as needed for constipation, Disp-507 g, R-3, E-Prescribe      simethicone (MYLICON) 125 MG chewable tablet Take 125 mg by mouth daily,  Historical      sodium bicarbonate 650 MG tablet Take 2 tablets (1,300 mg) by mouth 3 times daily for 90 days, Disp-540 tablet, R-3, E-Prescribe      tacrolimus (GENERIC EQUIVALENT) 1 MG capsule Take 4 capsules (4 mg) by mouth 2 times daily, Disp-240 capsule, R-11, E-PrescribeTXP DT 8/3/2019 (Kidney) TXP Dischg DT 8/12/2019 DX Kidney replaced by transplant Z94.0 TX [facility] ([place], [place])      vitamin D3 (CHOLECALCIFEROL) 50 mcg (2000 units) tablet Take 1 tablet (50 mcg) by mouth daily for 90 days, Disp-90 tablet, R-3, E-Prescribe      vancomycin (VANCOCIN) 125 MG capsule Take 1 capsule (125 mg) by mouth 4 times daily for 14 days, Disp-56 capsule, R-0, E-Prescribe           Allergies   Allergies   Allergen Reactions    Contrast Dye Rash     CT contrast allergy 12/14/19 rash over eyes. Need to have pre medication before a CT WITH CONTRAST     Diatrizoate Rash     CT contrast allergy 12/14/19 rash over eyes. Need to have pre medication before a CT WITH CONTRAST     Lisinopril Swelling     angioedema    Nitrous Oxide Other (See Comments)     Sense of doom    Chlorhexidine Rash     Rash at site    Sulfa Antibiotics Rash     Muscle stiffness of neck    Dapsone      Methemoglobinemia    Furosemide Other (See Comments)     Skin flushing    Metrogel [Metronidazole]      Hives diffusely on body after vaginal administration.    Azithromycin Dizziness and Rash    Cefuroxime Rash

## 2023-09-11 ENCOUNTER — MEDICAL CORRESPONDENCE (OUTPATIENT)
Dept: HEALTH INFORMATION MANAGEMENT | Facility: CLINIC | Age: 33
End: 2023-09-11
Payer: MEDICARE

## 2023-09-11 ENCOUNTER — PATIENT OUTREACH (OUTPATIENT)
Dept: SURGERY | Facility: CLINIC | Age: 33
End: 2023-09-11
Payer: MEDICARE

## 2023-09-11 NOTE — PROGRESS NOTES
RN Post-Op/Post-Discharge Care Coordination Note    Ms. Mona Wagner is a 32 year old female who was hospitalized with early acute cholecystitis which was managed conservatively.  She was recently discharged from the hospital.  Spoke with the patient.    Support  Patient able to care for self independently     Health Status  Nausea/Vomiting: Patient denies nausea/vomiting.  Eating/drinking: Patient is able to eat and drink without any complaints. Trying to stay on a low fat diet.  Fevers/chills: Patient denies any fever or chills.  Pain: None  New Medications:  Augmentin, home meds    Activity/Restrictions  No restrictions    Equipment  None    Pathology reviewed with patient:  N/A    Forms/Letters  No    All of her questions were answered.  She will call this office if she has any further questions and/or concerns.      Video visit arranged 9/27 at 1000 with Dr. Nicole.    Whom and When to Call  Patient acknowledges understanding of how to manage any medication changes and   when to seek medical care.     Patient advised that if after hour medical concerns arise to please call [phone] and choose option 4 to speak to the physician on call.

## 2023-09-12 ENCOUNTER — PATIENT OUTREACH (OUTPATIENT)
Dept: CARE COORDINATION | Facility: CLINIC | Age: 33
End: 2023-09-12

## 2023-09-12 ENCOUNTER — ANCILLARY PROCEDURE (OUTPATIENT)
Dept: INTERVENTIONAL RADIOLOGY/VASCULAR | Facility: CLINIC | Age: 33
End: 2023-09-12
Attending: INTERNAL MEDICINE
Payer: MEDICARE

## 2023-09-12 DIAGNOSIS — Z94.0 KIDNEY REPLACED BY TRANSPLANT: ICD-10-CM

## 2023-09-12 PROCEDURE — 50431 NJX PX NFROSGRM &/URTRGRM: CPT | Mod: RT | Performed by: PHYSICIAN ASSISTANT

## 2023-09-12 RX ORDER — IOPAMIDOL 510 MG/ML
100 INJECTION, SOLUTION INTRAVASCULAR ONCE
Status: ACTIVE | OUTPATIENT
Start: 2023-09-12

## 2023-09-12 RX ORDER — IOPAMIDOL 510 MG/ML
100 INJECTION, SOLUTION INTRAVASCULAR ONCE
OUTPATIENT
Start: 2023-09-12 | End: 2023-09-12

## 2023-09-12 NOTE — PROGRESS NOTES
Clinic Care Coordination Contact  Follow Up Progress Note      Assessment: The pt was recently in the ED, I called to check up on the pt, and help the pt setup a ED follow up. The pt was at  [facility] abbdominal pain. I called and talked to the pt, pt stated that she is doing better. Pt stated that she did want to make a follow up, and if she changes her mind, she can cancel it. I was able to setup for the pt to come in on 09/21/2023 at 1:20pm with .    Care Gaps:    Health Maintenance Due   Topic Date Due    MICROALBUMIN  Never done    ADVANCE CARE PLANNING  Never done    HEPATITIS B IMMUNIZATION (1 of 1 - Risk Dialysis 4-dose series) 08/03/2006    MEDICARE ANNUAL WELLNESS VISIT  03/13/2021    COVID-19 Vaccine (5 - Moderna risk series) 06/17/2022    LIPID  08/20/2022    INFLUENZA VACCINE (1) 09/01/2023           Care Plans      Intervention/Education provided during outreach:               Plan:     Care Coordinator will follow up in

## 2023-09-12 NOTE — PROGRESS NOTES
Mona Wagner  [id number]    Patient with right lower quadrant transplant kidney.  Nephrostomy tube placed 6/2023 for hydronephrosis secondary to pregnancy external compression. Patient is now postpartum.      Last nephrostogram completed 8/14/2023 showed urinary stasis with no contrast exiting to the urinary bladder.      Patient returns today and antegrade nephrostogram shows continued urinary stasis with no patency to the urinary bladder.      Patient instructed to continue to maintain nephrostomy to external drainage and to follow-up with transplant nephrology.  If needed, nephrostomy tube to be exchanged every 3 months routinely.

## 2023-09-13 ENCOUNTER — LAB (OUTPATIENT)
Dept: LAB | Facility: HOSPITAL | Age: 33
End: 2023-09-13
Payer: MEDICARE

## 2023-09-13 DIAGNOSIS — Z48.298 AFTERCARE FOLLOWING ORGAN TRANSPLANT: ICD-10-CM

## 2023-09-13 DIAGNOSIS — Z94.0 KIDNEY REPLACED BY TRANSPLANT: ICD-10-CM

## 2023-09-13 LAB
ALBUMIN MFR UR ELPH: 18.6 MG/DL
ANION GAP SERPL CALCULATED.3IONS-SCNC: 12 MMOL/L (ref 7–15)
BUN SERPL-MCNC: 22.4 MG/DL (ref 6–20)
CALCIUM SERPL-MCNC: 9.3 MG/DL (ref 8.6–10)
CHLORIDE SERPL-SCNC: 104 MMOL/L (ref 98–107)
CREAT SERPL-MCNC: 1.15 MG/DL (ref 0.51–0.95)
CREAT UR-MCNC: 25.2 MG/DL
DEPRECATED HCO3 PLAS-SCNC: 24 MMOL/L (ref 22–29)
EGFRCR SERPLBLD CKD-EPI 2021: 65 ML/MIN/1.73M2
ERYTHROCYTE [DISTWIDTH] IN BLOOD BY AUTOMATED COUNT: 12.9 % (ref 10–15)
GLUCOSE SERPL-MCNC: 103 MG/DL (ref 70–99)
HCT VFR BLD AUTO: 35.7 % (ref 35–47)
HGB BLD-MCNC: 11.6 G/DL (ref 11.7–15.7)
MCH RBC QN AUTO: 29.9 PG (ref 26.5–33)
MCHC RBC AUTO-ENTMCNC: 32.5 G/DL (ref 31.5–36.5)
MCV RBC AUTO: 92 FL (ref 78–100)
PLATELET # BLD AUTO: 237 10E3/UL (ref 150–450)
POTASSIUM SERPL-SCNC: 4.4 MMOL/L (ref 3.4–5.3)
PROT/CREAT 24H UR: 0.74 MG/MG CR (ref 0–0.2)
RBC # BLD AUTO: 3.88 10E6/UL (ref 3.8–5.2)
SODIUM SERPL-SCNC: 140 MMOL/L (ref 136–145)
TACROLIMUS BLD-MCNC: 6.5 UG/L (ref 5–15)
TME LAST DOSE: NORMAL H
TME LAST DOSE: NORMAL H
WBC # BLD AUTO: 6 10E3/UL (ref 4–11)

## 2023-09-13 PROCEDURE — 36415 COLL VENOUS BLD VENIPUNCTURE: CPT

## 2023-09-13 PROCEDURE — 85027 COMPLETE CBC AUTOMATED: CPT

## 2023-09-13 PROCEDURE — 80197 ASSAY OF TACROLIMUS: CPT

## 2023-09-13 PROCEDURE — 80048 BASIC METABOLIC PNL TOTAL CA: CPT

## 2023-09-13 PROCEDURE — 84156 ASSAY OF PROTEIN URINE: CPT

## 2023-09-14 ENCOUNTER — PATIENT OUTREACH (OUTPATIENT)
Dept: CARE COORDINATION | Facility: CLINIC | Age: 33
End: 2023-09-14
Payer: MEDICARE

## 2023-09-14 NOTE — PROGRESS NOTES
Anemia Management Note  SUBJECTIVE/OBJECTIVE:  Referred by Dr. Gelacio Mcintyre on 2023  Primary Diagnosis: Anemia in Chronic Kidney Disease (N18.3, D63.1)   3a  Secondary Diagnosis:  Chronic Kidney Disease, Stage 3 (N18.3)  3a  Hgb goal range:  9-10  Kidney Tx: 8/3/2019  Epo/Darbo: Aranesp 40mcg every 14 days for Hgb <10.0. In Clinic.   Iron regimen:  Prenatal vit with Iron.   *Elevated Ferritin levels.   Labs : 2024  Recent CHARLINE use, transfusion, IV iron: Aranesp .  RX/TX plans : 2024     *Prefers [facility]      Hx of DVT (), High Risk Pregnancy (2nd trimester).     Contact: OK to speak with Stephan Wagner (father) and Savanah Wagner (sister) regarding Medical Information per consent to communicate dated 4/3/2020.        Latest Ref Rng & Units 2023    12:49 PM 2023    10:04 AM 2023    10:07 AM 2023    10:09 AM 2023    10:06 AM 2023     2:34 AM 2023     9:34 AM   Anemia   Hemoglobin 11.7 - 15.7 g/dL 9.7  10.6  11.2  11.3  11.7  11.2  11.6      BP Readings from Last 3 Encounters:   23 121/79   23 116/80   23 119/82     Wt Readings from Last 2 Encounters:   23 69.4 kg (153 lb)   23 72.2 kg (159 lb 1.6 oz)           ASSESSMENT:  Hgb:Above goal - recommend hold dose  TSat: at goal >30% Ferritin: Elevated (>1000ng/mL)    PLAN:  Hold Aranesp and RTC for hgb then aranesp if needed in 2-4 week(s)    Orders needed to be renewed (for next follow-up date) in EPIC: None    Iron labs due:  End of Oct 2023    Plan discussed with:  No call, chart review      NEXT FOLLOW-UP DATE:  10/12/23    Manjula Mckinnon RN   Anemia Services  [facility]  [email or web address]   Office : [phone]  Fax: [phone]

## 2023-09-15 ENCOUNTER — TELEPHONE (OUTPATIENT)
Dept: TRANSPLANT | Facility: CLINIC | Age: 33
End: 2023-09-15
Payer: MEDICARE

## 2023-09-15 DIAGNOSIS — Z94.0 KIDNEY REPLACED BY TRANSPLANT: ICD-10-CM

## 2023-09-15 RX ORDER — TACROLIMUS 1 MG/1
3 CAPSULE ORAL 2 TIMES DAILY
Qty: 180 CAPSULE | Refills: 11
Start: 2023-09-15 | End: 2023-09-21

## 2023-09-15 NOTE — TELEPHONE ENCOUNTER
UPC: 0.74 on 9/13 up from 0.38 on 9/6.  Patient states BP has been running ~110/60s, denies signs of infection will continue to monitor with weekly labs    Tacrolimus level 6.5 at 12 hours, on 9/13.  Goal 4-6.   Current dose 4 mg in AM, 4 mg in PM    Dose changed to 3.5 mg in AM, 3.5 mg in PM   Recheck level in 1 week    Discussed with Mona Trejo RN   Transplant Coordinator  [phone]

## 2023-09-19 NOTE — TELEPHONE ENCOUNTER
REFERRAL INFORMATION:  Referring Provider: Self-Referred  Referring Clinic: [facility] - ED  Reason for Visit/Diagnosis: Gallbladder, hospital f/u       FUTURE VISIT INFORMATION:  Appointment Date: 2023  Appointment Time: 10 AM     NOTES RECORD STATUS  DETAILS   OFFICE NOTE from Referring Provider N/A    OFFICE NOTE from Other Specialists [facility]:  23 - SOT OV with Dr. Hill  23 - URO OV with Dr. Britt  22 - GEN SURG OV with Dr. Nicole   [facility] DISCHARGE SUMMARY/ ED VISITS  [facility]  [facility]  [facility] - [facility] with Dr. Oscar   OPERATIVE REPORT [facility]:  8/3/19 - OP Note for KIDNEY TRANSPLANT FROM  DONOR with Dr. Johnson   PERTINENT LABS Internal    PATHOLOGY REPORTS (RELATED) [facility]:  19 - Kidney (Case: [id number])   IMAGING (CT, MRI, US, XR)  [facility]:  23 - US Abdomen  23, 23, 23, 23, 23 - US Renal  22 - CT Abd/Pelvis

## 2023-09-20 ENCOUNTER — LAB (OUTPATIENT)
Dept: LAB | Facility: HOSPITAL | Age: 33
End: 2023-09-20
Payer: MEDICARE

## 2023-09-20 DIAGNOSIS — Z94.0 KIDNEY REPLACED BY TRANSPLANT: ICD-10-CM

## 2023-09-20 DIAGNOSIS — Z48.298 AFTERCARE FOLLOWING ORGAN TRANSPLANT: ICD-10-CM

## 2023-09-20 LAB
ALBUMIN MFR UR ELPH: 9 MG/DL
ANION GAP SERPL CALCULATED.3IONS-SCNC: 10 MMOL/L (ref 7–15)
BUN SERPL-MCNC: 23.8 MG/DL (ref 6–20)
CALCIUM SERPL-MCNC: 9.2 MG/DL (ref 8.6–10)
CHLORIDE SERPL-SCNC: 104 MMOL/L (ref 98–107)
CREAT SERPL-MCNC: 1.2 MG/DL (ref 0.51–0.95)
CREAT UR-MCNC: 29 MG/DL
DEPRECATED HCO3 PLAS-SCNC: 23 MMOL/L (ref 22–29)
EGFRCR SERPLBLD CKD-EPI 2021: 62 ML/MIN/1.73M2
ERYTHROCYTE [DISTWIDTH] IN BLOOD BY AUTOMATED COUNT: 12.8 % (ref 10–15)
GLUCOSE SERPL-MCNC: 108 MG/DL (ref 70–99)
HCT VFR BLD AUTO: 35.3 % (ref 35–47)
HGB BLD-MCNC: 11.5 G/DL (ref 11.7–15.7)
MCH RBC QN AUTO: 29.6 PG (ref 26.5–33)
MCHC RBC AUTO-ENTMCNC: 32.6 G/DL (ref 31.5–36.5)
MCV RBC AUTO: 91 FL (ref 78–100)
PLATELET # BLD AUTO: 219 10E3/UL (ref 150–450)
POTASSIUM SERPL-SCNC: 4.3 MMOL/L (ref 3.4–5.3)
PROT/CREAT 24H UR: 0.31 MG/MG CR (ref 0–0.2)
RBC # BLD AUTO: 3.89 10E6/UL (ref 3.8–5.2)
SODIUM SERPL-SCNC: 137 MMOL/L (ref 136–145)
TACROLIMUS BLD-MCNC: 7 UG/L (ref 5–15)
TME LAST DOSE: NORMAL H
TME LAST DOSE: NORMAL H
WBC # BLD AUTO: 5.7 10E3/UL (ref 4–11)

## 2023-09-20 PROCEDURE — 82374 ASSAY BLOOD CARBON DIOXIDE: CPT

## 2023-09-20 PROCEDURE — 84156 ASSAY OF PROTEIN URINE: CPT

## 2023-09-20 PROCEDURE — 82435 ASSAY OF BLOOD CHLORIDE: CPT

## 2023-09-20 PROCEDURE — 36415 COLL VENOUS BLD VENIPUNCTURE: CPT

## 2023-09-20 PROCEDURE — 82947 ASSAY GLUCOSE BLOOD QUANT: CPT

## 2023-09-20 PROCEDURE — 85027 COMPLETE CBC AUTOMATED: CPT

## 2023-09-20 PROCEDURE — 80197 ASSAY OF TACROLIMUS: CPT

## 2023-09-20 NOTE — PROGRESS NOTES
Hospital follow up  - [facility]  for acute cholecystitis, decided against denny watkins. Discharged on 7d course of Augmentin (last dose /)  - 3 wks post partum at that time. Now 5 weeks out from .     Mona Wagner is a 32 year old female who is 3 weeks post partum with PMHx of ESRD 2/2 IgA nephropathy s/p DDKT (),  R tx renal hydronephrosis 2/2 to uterine compression s/p PNT, and hypothyroidism who presents with acute intermittent RUQ pain. Patient reported having four episodes of severe RUQ pain that would last for 15 minutes over the past 24 hours prior to presentation. On exam she was soft with mild RUQ tenderness. Imaging demonstrated cholelithiasis without GB wall thickening or pericholecystic fluid. We suspected that this might be early acute cholecystitis and consented the patient for a laparoscopic cholecystectomy for . Pt was allowed to eat and drink on  and reported that she was feeling well. She decided that she did not want to undergo surgery and was discharged with seven day course of augmentin as well as scheduled to have follow up with our team in a few weeks.       S/P tonsillectomy and adenoidectomy  7 yo in [facility]

## 2023-09-21 ENCOUNTER — OFFICE VISIT (OUTPATIENT)
Dept: MATERNAL FETAL MEDICINE | Facility: CLINIC | Age: 33
End: 2023-09-21
Attending: STUDENT IN AN ORGANIZED HEALTH CARE EDUCATION/TRAINING PROGRAM
Payer: MEDICARE

## 2023-09-21 ENCOUNTER — OFFICE VISIT (OUTPATIENT)
Dept: FAMILY MEDICINE | Facility: CLINIC | Age: 33
End: 2023-09-21
Payer: MEDICARE

## 2023-09-21 VITALS
OXYGEN SATURATION: 96 % | HEART RATE: 75 BPM | BODY MASS INDEX: 30.04 KG/M2 | SYSTOLIC BLOOD PRESSURE: 107 MMHG | RESPIRATION RATE: 20 BRPM | WEIGHT: 153 LBS | DIASTOLIC BLOOD PRESSURE: 72 MMHG | TEMPERATURE: 97.4 F | HEIGHT: 60 IN

## 2023-09-21 VITALS
RESPIRATION RATE: 16 BRPM | DIASTOLIC BLOOD PRESSURE: 77 MMHG | OXYGEN SATURATION: 98 % | WEIGHT: 154 LBS | HEART RATE: 81 BPM | SYSTOLIC BLOOD PRESSURE: 112 MMHG | BODY MASS INDEX: 30.08 KG/M2

## 2023-09-21 DIAGNOSIS — K81.0 ACUTE CHOLECYSTITIS: ICD-10-CM

## 2023-09-21 DIAGNOSIS — F41.9 ANXIETY: ICD-10-CM

## 2023-09-21 DIAGNOSIS — Z94.0 KIDNEY REPLACED BY TRANSPLANT: ICD-10-CM

## 2023-09-21 DIAGNOSIS — N30.01 ACUTE CYSTITIS WITH HEMATURIA: ICD-10-CM

## 2023-09-21 DIAGNOSIS — Z87.448 HISTORY OF HYDRONEPHROSIS: Primary | ICD-10-CM

## 2023-09-21 DIAGNOSIS — Z94.0 KIDNEY REPLACED BY TRANSPLANT: Primary | ICD-10-CM

## 2023-09-21 LAB
ALBUMIN UR-MCNC: >=300 MG/DL
APPEARANCE UR: CLEAR
BILIRUB UR QL STRIP: NEGATIVE
COLOR UR AUTO: YELLOW
GLUCOSE UR STRIP-MCNC: NEGATIVE MG/DL
HGB UR QL STRIP: ABNORMAL
KETONES UR STRIP-MCNC: NEGATIVE MG/DL
LEUKOCYTE ESTERASE UR QL STRIP: ABNORMAL
NITRATE UR QL: POSITIVE
PH UR STRIP: 6 [PH] (ref 5–7)
SP GR UR STRIP: 1.02 (ref 1–1.03)
UROBILINOGEN UR STRIP-ACNC: 0.2 E.U./DL

## 2023-09-21 PROCEDURE — G0463 HOSPITAL OUTPT CLINIC VISIT: HCPCS | Performed by: OBSTETRICS & GYNECOLOGY

## 2023-09-21 PROCEDURE — 87086 URINE CULTURE/COLONY COUNT: CPT

## 2023-09-21 PROCEDURE — 87186 SC STD MICRODIL/AGAR DIL: CPT

## 2023-09-21 PROCEDURE — 99213 OFFICE O/P EST LOW 20 MIN: CPT | Mod: GC | Performed by: OBSTETRICS & GYNECOLOGY

## 2023-09-21 PROCEDURE — 87088 URINE BACTERIA CULTURE: CPT

## 2023-09-21 PROCEDURE — 81003 URINALYSIS AUTO W/O SCOPE: CPT

## 2023-09-21 PROCEDURE — 99214 OFFICE O/P EST MOD 30 MIN: CPT | Mod: GC

## 2023-09-21 RX ORDER — ACETAMINOPHEN AND CODEINE PHOSPHATE 120; 12 MG/5ML; MG/5ML
0.35 SOLUTION ORAL DAILY
Qty: 90 TABLET | Refills: 3 | Status: SHIPPED | OUTPATIENT
Start: 2023-09-21 | End: 2024-09-27

## 2023-09-21 RX ORDER — TACROLIMUS 1 MG/1
3 CAPSULE ORAL 2 TIMES DAILY
Qty: 180 CAPSULE | Refills: 11 | Status: SHIPPED | OUTPATIENT
Start: 2023-09-21

## 2023-09-21 ASSESSMENT — EDINBURGH POSTNATAL DEPRESSION SCALE (EPDS)
THE THOUGHT OF HARMING MYSELF HAS OCCURRED TO ME: NEVER
I HAVE BEEN SO UNHAPPY THAT I HAVE BEEN CRYING: NO, NEVER
I HAVE FELT SAD OR MISERABLE: NOT VERY OFTEN
I HAVE LOOKED FORWARD WITH ENJOYMENT TO THINGS: AS MUCH AS I EVER DID
I HAVE FELT SCARED OR PANICKY FOR NO GOOD REASON: YES, SOMETIMES
TOTAL SCORE: 9
I HAVE BEEN SO UNHAPPY THAT I HAVE HAD DIFFICULTY SLEEPING: NOT VERY OFTEN
I HAVE BEEN ABLE TO LAUGH AND SEE THE FUNNY SIDE OF THINGS: NOT QUITE SO MUCH NOW
I HAVE BEEN ANXIOUS OR WORRIED FOR NO GOOD REASON: YES, SOMETIMES
THINGS HAVE BEEN GETTING ON TOP OF ME: NO, MOST OF THE TIME I HAVE COPED QUITE WELL
I HAVE BLAMED MYSELF UNNECESSARILY WHEN THINGS WENT WRONG: NOT VERY OFTEN

## 2023-09-21 ASSESSMENT — PAIN SCALES - GENERAL: PAINLEVEL: NO PAIN (0)

## 2023-09-21 NOTE — NURSING NOTE
Mona seen in clinic today for 6 week PPV s/p IOL/vaginal delivery at 34w1d. VSS, off BP meds. Pt reports baby is home and doing well. Pt is formula feeding. Denies any breast concerns. Denies leg pain or tenderness. No concerns with perineum. Vaginal bleeding scant. Pt wondering if she maybe had a period this past week. Experienced light vaginal bleeding with cramping. EPNS completed. No s/sx of infection. Pt continues to have nephrostomy tube in place and is wondering about the plan. Transplant is working with urology to determine next steps. Gallbladder symptoms much improved since starting low fat diet. Pt has follow up with GI (gallbladder follow up) today, follow up with transplant team next week. Dr. Montiel and Dr. Tellez met with pt (see notes). Pt discharged stable and ambulatory.    Delicia Alba RN

## 2023-09-21 NOTE — TELEPHONE ENCOUNTER
Left message and sent [employer] message to patient regarding:  Tacrolimus IR level 7 on 9/20/2023, goal 4-6, dose 3.54 mg BID.     LPN TASK/ PLAN:   Please call patient and confirm this was an accurate 12-hour trough. Verify Tacrolimus IR dose 3.5 mg BID. Confirm no new medications or illness. Confirm no missed doses. If accurate trough and accurate dose, decrease Tacrolimus IR dose to 3 mg BID and repeat labs in 1 weeks

## 2023-09-21 NOTE — PROGRESS NOTES
Preceptor Attestation:  Patient's case reviewed and discussed with Aniceto Szymanski MD resident and I evaluated the patient. I agree with written assessment and plan of care.  Supervising Physician:  USAMA CANNON MD  [facility]

## 2023-09-21 NOTE — TELEPHONE ENCOUNTER
ISSUE:   Tacrolimus IR level 7 on 9/20/2023, goal 4-6, dose 3.54 mg BID.    LPN TASK/ PLAN:   Please call patient and confirm this was an accurate 12-hour trough. Verify Tacrolimus IR dose 3.5 mg BID. Confirm no new medications or illness. Confirm no missed doses. If accurate trough and accurate dose, decrease Tacrolimus IR dose to 3 mg BID and repeat labs in 1 weeks    Please update RX thank you.    Jennifer Trejo RN   Transplant Coordinator  [phone]

## 2023-09-21 NOTE — PROGRESS NOTES
Assessment & Plan     History of hydronephrosis  Slightly cloudy urine over last few days with concern for early infection. Given patient's history of severe infections will get UA today. Reflex culture warranted with patient's recent history of C. Diff.  - UA Macroscopic with reflex to Microscopic and Culture    Anxiety  Primarily related to changes in her health. Previously managed with hydroxyzine as needed, but not taking now since makes her drowsy and has new baby at home. Feels she has a good support system and does not want to try any other medications at this time. Discussed returning to clinic if she feels her symptoms are getting worse or if she would like to try any medications, potentially SSRI as patient has not tried these before.    Acute cholecystitis  Essentially asymptomatic at this time following augmentin course. Recommended avoidance of fatty foods, at least over next few weeks. Discussed that management on our end would primarily be guided by symptoms. Encouraged patient to keep upcoming follow up visit with surgery.     No follow-ups on file.    Delicia Silva, MS3    I was present with the medical student who participated in the service and in the documentation of this note. I have verified the history and personally performed the physical exam and medical decision making, and have verified the content of the note, which accurately reflects my assessment of the patient and the plan of care.     Aniceto Szymanski MD  [facility]  [facility]      Elsa Dunn is a 32 year old, presenting for the following health issues:  ER F/U (For gallbladder and C-diff)      HPI      6 week postpartum female with history of DDKT in 2019 2/2 IgA nephropathy with PNT placement 2/2 hydronephrosis, anemia of chronic disease, hypothyroidism, FATOUMATA, and remote DVT history presenting as ED follow up for acute cholecystitis.    Presented to [facility] ED on  with severe  episodic UQ pain. Imaging was consistent with early acute cholecystitis. Initially planned for lap shital on 9/9 but patient was feeling better and did not want to undergo surgery. Was discharged with 7 day course of Augmentin. Since then, has not had any more episodes of gallbladder pain. Does notice some twinges/discomfort sometimes with eating, but no pain. No nausea, vomiting, fever. Finished last dose on Augmentin on the 16th. Thinks she wants to cancel her scheduled follow up with surgery on 9/27 because she does not want to undergo surgery.    Diagnosed with C. Diff infection on 8/24 after several day history of mild diarrhea. Had been on antibiotics off and on during pregnancy due to recurrent neph tube infections. Treated with course of vancomycin, completed on 9/8.    Concerned for repeat infections with her neph tube. Is waiting to hear back from her transplant team and urology about the plan for the tube going forward. Has an appointment with transplant nephrologist on 9/25. Did note some slightly more cloudy urine the last few days, wants to make sure there is not another infection brewing.     Anxiety previously managed with as needed hydroxyzine, but has not been using since baby was born because it makes her drowsy. Does note increased anxiety related to her health recently, but not necessarily interested in starting a new medication at this time. Feels she has a good support system at home.      Review of Systems   Constitutional, HEENT, cardiovascular, pulmonary, gi and gu systems are negative, except as otherwise noted.      Objective    /72   Pulse 75   Temp 97.4  F (36.3  C)   Resp 20   Ht 1.524 m (5')   Wt 69.4 kg (153 lb)   LMP 12/16/2022   SpO2 96%   BMI 29.88 kg/m    Body mass index is 29.88 kg/m .  Physical Exam   GENERAL: healthy, alert and no distress  NECK: no adenopathy, no asymmetry, masses, or scars and thyroid normal to palpation  RESP: lungs clear to auscultation - no  rales, rhonchi or wheezes  CV: regular rate and rhythm, normal S1 S2, no S3 or S4, no murmur, click or rub, no peripheral edema and peripheral pulses strong  ABDOMEN: soft, nontender, no hepatosplenomegaly, no masses and bowel sounds normal. No redness or warmth at neph tube site. No CVA or suprapubic tenderness.  MS: no gross musculoskeletal defects noted, no edema  PSYCH: mentation appears normal, affect normal/bright    Results for orders placed or performed in visit on 09/21/23 (from the past 24 hour(s))   UA Macroscopic with reflex to Microscopic and Culture    Specimen: Urine, Catheter   Result Value Ref Range    Color Urine Yellow Colorless, Straw, Light Yellow, Yellow    Appearance Urine Clear Clear    Glucose Urine Negative Negative mg/dL    Bilirubin Urine Negative Negative    Ketones Urine Negative Negative mg/dL    Specific Gravity Urine 1.025 1.003 - 1.035    Blood Urine Large (A) Negative    pH Urine 6.0 5.0 - 7.0    Protein Albumin Urine >=300 (A) Negative mg/dL    Urobilinogen Urine 0.2 0.2, 1.0 E.U./dL    Nitrite Urine Positive (A) Negative    Leukocyte Esterase Urine Moderate (A) Negative    Narrative    QNS for microscopic. Pt like UC done, please order. I will have enough for UC. Thanks, Polo     *Note: Due to a large number of results and/or encounters for the requested time period, some results have not been displayed. A complete set of results can be found in Results Review.               Agree with note refer to resident and attending notes.  Maria Teresa Larsen MD

## 2023-09-21 NOTE — TELEPHONE ENCOUNTER
Patient confirms this was an accurate 12-hour trough. Verified Tacrolimus IR dose 3.5 mg BID. Confirmed no new medications or illness. Confirmed no missed doses. Patient confirms decrease Tacrolimus IR dose to 3 mg BID and repeat labs in 1 weeks

## 2023-09-21 NOTE — PROGRESS NOTES
[facility]-Postpartum Visit  Clinic Progress Note    Mona Wagner  : 1990  MRN: [id number]    Chief Complaint: Postpartum visit    History of Present Illness:  Mona Wagner is a 32 year old now  female who presents for a 6 week postpartum visit.  Patient was followed by M primarily during her pregnancy for history of renal transplant secondary to IgA nephropathy, PNT due to moderate hydronephrosis and CAEMRON, pyelonephritis, pre-eclampsia w/o SF, ICP, hypothyroidism, bicornuate uterus, remote hx bacterial endocarditis, FATOUMATA with CPAP, remote hx DVT. She was admitted to antepartum service at 32 weeks for pyelonephritis and CAMERON and ultimately underwent IOL at 34w for worsening of CAMERON and pre-eclampsia w/o SF.  She had a  on 23 and is now 5.5 weeks postpartum.  She had a second degree perineal laceration.  Her postpartum course was notable for improvement of cholestasis symptoms.  Nifedipine XL titrated to 60 mg BID with control of her BP.  She had a PNT exchange with IR on 23.  She was restarted on Mycophenolic acid, Tacro was decreased to 4 mg BID, and she was discontinued from Azathioprine.  Plan for contraception POP.  Did not plan breastfeeding.    Since discharge she was seen by Dr. Hill on 23.  Her Creatinine was trending down slightly and was felt to be a mild CAMERON secondary to pre-eclampsia.  Per Dr. Hill proteinuria may take time to resolve.  If no improvement over the next month or so Dr. Hill would consider a kidney transplant biopsy.  Florinef and Calcitriol were not restarted (was taking prior to pregnancy).  Dr. Hill recommended that she consider an excisional biopsy for right axillary lymph node that was being followed prior to pregnancy and is overall stable however she was referred to general surgery for this in 2022 prior to pregnancy.  She saw IR 23 with antegrade nephrostogram with continued urinary stasis with no patency to the urinary bladder.  She was instructed  to continue nephrostomy to external drainage and follow up with transplant nephrology.  If needed, recommend exchange q3 months.  See labs for trending of CMP and tacro levels.  Next visit with Dr. Hill 9/25/23.  She has discontinued Nifedipine XL as instructed by transplant team.    She was then admitted for 9/8/23 for symptomatic cholelithiasis/early acute cholecystitis.  She presented with several episodes of severe RUQ pain that would last 15 minutes and came on after eating.  Imaging demonstrated cholelithiasis without gallbladder wall thickening or pericholecystic fluid.  She was consented for laparoscopic cholecystectomy but her symptoms resolved and she was instead managed conservatively as she preferred not to undergo surgery at that time.  She was discharged with a 7 day course of Augmentin and scheduled for outpatient surgical follow up.  She has video visit 9/27/23 at 1000 with Dr. Nicole.    She was also diagnosed with C. Diff infection 8/24/23 and prescribed oral Vancomycin.  States her diarrhea was very mild and has been attributed to her courses of abx in pregnancy.    Today she reports doing well.  She is not breastfeeding and is not planning to do so. Denies vaginal bleeding.  Has not resumed intercourse.  Denies concerns with perineal laceration.  Reports mood is overall well.  Energy level is increased.  Denies fever, chills, headache, chest pain, shortness of breath, leg swelling, nausea, vomiting.  Denies additional concerns.  Baby discharged from NICU at 36 weeks and is doing very well.  Taking BP at home 110s-120s/60s-70s.    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  - FATOUMATA  - Hypothyroidism  - Hx of DVT 2014 of upper extremity associated with dialysis catheter  - Hx  of bacterial endocarditis  - Bicornuate uterus  - Anemia, suspect anemia of chronic disease, receiving Aranesp infusions (erythropoeitin)  -pyelonephritis  - pre-e w/o SF   - cholestasis    Obstetric History:  OB History    Para Term  AB Living   1 1 0 1 0 1   SAB IAB Ectopic Multiple Live Births   0 0 0 0 1      # Outcome Date GA Lbr Robinson/2nd Weight Sex Delivery Anes PTL Lv   1  23 34w1d 00:43 / 00:29  M Vag-Spont EPI Y JUAN JOSE      Name: ESTELA,MALE-LIZZETTE      Apgar1: 7  Apgar5: 9       ROS:  A 10 point review of systems was conducted and negative except as noted in HPI    Exam:  /77 (BP Location: Right arm, Patient Position: Chair)   Pulse 81   Resp 16   Wt 69.9 kg (154 lb)   LMP 2022   SpO2 98%   BMI 30.08 kg/m      Weight:  154 lb  Weight @ 14 weeks pregnancy:  171 lb  -17 lb    Gen:  alert, oriented, appears well  CV:  regular rate and rhythm  Pulm:  non-labored  Abdominal:  non-tender; PNT in place RLQ  Ext: no edema, non-tender  Pelvic: 2nd degree perineal laceration well healed without drainage, breakdown, discharge    Labs:    Component Ref Range & Units 1 d ago  (23) 8 d ago  (23) 2 wk ago  (23) 3 wk ago  (23) 4 wk ago  (23) 1 mo ago  (23) 1 mo ago  (23)     Total Protein Urine mg/mg Creat 0.00 - 0.20 mg/mg Cr 0.31 High  0.74 High  0.38 High  0.72 High  0.89 High  1.99 High  0.82 High      Tacrolimus by Tandem Mass Spectrometry 5.0 - 15.0 ug/L 7.0 6.5 CM 7.7 CM 5.5 CM 3.6 Low  CM 4.0 Low  CM 4.5 Low  CM     Hemoglobin 11.7 - 15.7 g/dL 11.5 Low  11.6 Low  11.2 Low  11.7 11.3 Low  11.2 Low  10.6     Creatinine 0.51 - 0.95 mg/dL 1.20 High  1.15 High  1.24 High  1.25 High  1.10 High  1.01 High  1.10     23  AST 19  ALT 15    Imagin23 IR antegrade nephrostogram:   continued urinary stasis with no patency to the urinary bladder    Assessment/Plan:  32 year old  here for her postpartum visit.  She is overall doing well  from a postpartum standpoint.  Please see HPI above for full details of her course since discharge.    Postpartum  - meeting all postpartum goals  - POP for contraception prescribed today  - is not breastfeeding per choice  - pap smear 3/15/23 NILM HPV negative   Per guidelines for solid organ transplant, if >30 and negative co-testing then can be performed every 3 years, continue screening throughout lifetime (older than 65 years)  - perineal laceration healed    Renal transplant  Pre-eclampsia w/o SF  Pyelonephritis in pregnancy  CAMERON in pregnancy  Hydronephrosis in pregnancy with PNT  - Follows with transplant nephrology. Resolution of cHTN s/p transplant.  Next visit 9/25/23  - BP normotensive today; has discontinued nifedipine XL  - Nephrostomy tube exchanged with IR 8/14/23; antegrade nephrostogram performed 9/12/23, defer management to transplant team  - continue renal labs with transplant, Creatinine overall stable  - Baseline Cr 0.8-1.1 prior to pregnancy  - Close management of blood pressure with goal of <130/90     Cholestasis  - LFTs WNL  - symptoms resolved    Anemia of chronic disease  - continue follow up with anemia clinic; Aranesp PRN  - last hgb 11.5 on 9/20/23    Hypothyroidism:  Secondary Hyperparathyroidism with hypercalcemia   - Resection of parathyroid glands on 4/28.   - Follows with endo. Continue current levothyroxine prescription 25 mcg daily.  - 8/29/23 TSH 1.2, free T4 1.31  - Endocrinology next visit 10/6     Hx DVT upper extremity associated with dialysis catheter:  - Assessment for inherited thrombophilias including Factor V Leiden and Prothrombin Gene Mutation:  Negative.  - Additional hypercoagulability work up negative.     Hx of bacterial endocarditis:  - Echo on 3/20:  Interpretation Summary  Global and regional left ventricular function is hyperkinetic with an EF >70%.  Right ventricular function, chamber size, wall motion, and thickness are  normal.  Pulmonary artery systolic  pressure is normal.  The inferior vena cava cannot be assessed.  No pericardial effusion is present.    Follow up with Ob/Gyn annually.  Return to [facility] for preconception visit prior to next pregnancy.    Patient seen with Dr. Rosalinda Montiel MD   Maternal-Fetal Medicine Fellow, PGY7  9/21/2023 10:26 AM      Physician Attestation   I, Timothy Tellez MD, saw this patient and agree with the findings and plan of care as documented in the note.      Items personally reviewed/procedural attestation: Review of ongoing medical problems, follow-up plans with transplant, and contraceptive counseling. The total time spent in all patient care activities on the day of this visit was 20 minutes.    Timothy Tellez MD

## 2023-09-22 ENCOUNTER — TELEPHONE (OUTPATIENT)
Dept: FAMILY MEDICINE | Facility: CLINIC | Age: 33
End: 2023-09-22
Payer: MEDICARE

## 2023-09-22 ENCOUNTER — TELEPHONE (OUTPATIENT)
Dept: TRANSPLANT | Facility: CLINIC | Age: 33
End: 2023-09-22
Payer: MEDICARE

## 2023-09-22 DIAGNOSIS — R80.9 PROTEINURIA: ICD-10-CM

## 2023-09-22 DIAGNOSIS — Z94.0 KIDNEY REPLACED BY TRANSPLANT: Primary | ICD-10-CM

## 2023-09-22 DIAGNOSIS — N13.5 STRICTURE OR KINKING OF URETER: Primary | ICD-10-CM

## 2023-09-22 RX ORDER — NITROFURANTOIN 25; 75 MG/1; MG/1
100 CAPSULE ORAL 2 TIMES DAILY
Qty: 10 CAPSULE | Refills: 0 | Status: SHIPPED | OUTPATIENT
Start: 2023-09-22 | End: 2023-09-27

## 2023-09-22 NOTE — TELEPHONE ENCOUNTER
Called to pt discuss recent UA findings and cloudy urine. Pt w/ complicated medical hx significant for ESRD s/p renal transplant in 2019 d/t IgA nephropathy, now w/ multiple recurrent nephrostomy tube and transplant infections in the last year. Pt recently seen at [facility] for acute cholecystitis, elected for medical management and has since completed 7d course of augmentin for this. While in clinic yesterday (9/21) pt noted cloudy urine and concern for possible early UTI/pyelonephritis given her hx. She has been afebrile and denies any symptoms aside from this concentrated urine. UA drawn in clinic showing >300 protein, nitrite positive, and leuk esterase positive. I did call and discuss case w/ on call transplant nephrologist on call at [facility] this AM who agreed w/ asymptomatic bacturia would recommend avoidance of antibiotic therapy out of caution for MDR organism infection in the future. I discussed this with pt this evening who is in agreement and will watch carefully for systemic or urinary symptoms suggesting developing infection. Of note, she does mention she called Urology earlier today who sent script for Macrobid. We are both in agreement she will not pick this up unless symptoms develop. Given this is by definition a complicated UTI at minimum, really pyelonephritis by definition as sample are coming directly from the kidey via neph tube would strongly consider 7-10 day course of fluoroquinolone. Transplant team confirms this is safe while on Tacrolimus.     Pt expressed understanding of this plan as well as clear return precautions. All questions answered at this time.

## 2023-09-22 NOTE — PROGRESS NOTES
Spoke with patient who would like her nephrostomy tube removed now that she is 6 weeks post-partum.  Appointment established with Dr. Britt.  Patient also complaining of cloudy urine and increased frequency/urgency.  Patient had UA yesterday.  Antibiotic sent in per MD request.  This author's direct line provided for future questions/concerns.    Jericho Miller, RN  RN Care Coordinator - Urology

## 2023-09-22 NOTE — TELEPHONE ENCOUNTER
Gelacio Mcintyre MD Sveiven, Sara, RN; Nakul Hill MD  I got a call from her PCP, they were concerned about cloudy urine. She is asymptomatic, will not treat. I am concerned though about the protein on dip of >300. Can you please get UPCR with next labs? Steffen, including you because you see her on Monday. She has a PNT in place, has no patency to her ureter so remained in place on 9/12 with plan to exchange q3m. She has been in contact with urology regarding next steps but no appt made.    Lab ordered  [employer] message sent to pt

## 2023-09-24 LAB
BACTERIA UR CULT: ABNORMAL
BACTERIA UR CULT: ABNORMAL

## 2023-09-25 ENCOUNTER — TELEPHONE (OUTPATIENT)
Dept: ENDOCRINOLOGY | Facility: CLINIC | Age: 33
End: 2023-09-25
Payer: MEDICARE

## 2023-09-25 ENCOUNTER — VIRTUAL VISIT (OUTPATIENT)
Dept: TRANSPLANT | Facility: CLINIC | Age: 33
End: 2023-09-25
Attending: INTERNAL MEDICINE
Payer: MEDICARE

## 2023-09-25 VITALS — HEIGHT: 60 IN | BODY MASS INDEX: 30.04 KG/M2 | WEIGHT: 153 LBS

## 2023-09-25 DIAGNOSIS — E87.20 METABOLIC ACIDOSIS: ICD-10-CM

## 2023-09-25 DIAGNOSIS — N18.2 CKD (CHRONIC KIDNEY DISEASE) STAGE 2, GFR 60-89 ML/MIN: ICD-10-CM

## 2023-09-25 DIAGNOSIS — N30.00 ACUTE CYSTITIS WITHOUT HEMATURIA: Primary | ICD-10-CM

## 2023-09-25 DIAGNOSIS — D63.1 ANEMIA IN STAGE 2 CHRONIC KIDNEY DISEASE: ICD-10-CM

## 2023-09-25 DIAGNOSIS — N18.31 STAGE 3A CHRONIC KIDNEY DISEASE (H): ICD-10-CM

## 2023-09-25 DIAGNOSIS — E83.42 HYPOMAGNESEMIA: ICD-10-CM

## 2023-09-25 DIAGNOSIS — N18.2 ANEMIA IN STAGE 2 CHRONIC KIDNEY DISEASE: ICD-10-CM

## 2023-09-25 DIAGNOSIS — D84.9 IMMUNOSUPPRESSED STATUS (H): ICD-10-CM

## 2023-09-25 DIAGNOSIS — E03.9 ACQUIRED HYPOTHYROIDISM: Primary | ICD-10-CM

## 2023-09-25 DIAGNOSIS — Z48.298 AFTERCARE FOLLOWING ORGAN TRANSPLANT: ICD-10-CM

## 2023-09-25 DIAGNOSIS — T86.19 HYDRONEPHROSIS OF KIDNEY TRANSPLANT: ICD-10-CM

## 2023-09-25 DIAGNOSIS — Z22.1 CLOSTRIDIUM DIFFICILE CARRIER: ICD-10-CM

## 2023-09-25 DIAGNOSIS — E55.9 VITAMIN D DEFICIENCY: ICD-10-CM

## 2023-09-25 DIAGNOSIS — N13.30 HYDRONEPHROSIS OF KIDNEY TRANSPLANT: ICD-10-CM

## 2023-09-25 DIAGNOSIS — Z94.0 KIDNEY REPLACED BY TRANSPLANT: ICD-10-CM

## 2023-09-25 PROCEDURE — 99214 OFFICE O/P EST MOD 30 MIN: CPT | Mod: VID | Performed by: INTERNAL MEDICINE

## 2023-09-25 RX ORDER — VANCOMYCIN HYDROCHLORIDE 125 MG/1
125 CAPSULE ORAL 4 TIMES DAILY
Qty: 24 CAPSULE | Refills: 0 | Status: SHIPPED | OUTPATIENT
Start: 2023-09-25 | End: 2023-11-27

## 2023-09-25 RX ORDER — MAGNESIUM OXIDE 400 MG/1
400 TABLET ORAL EVERY MORNING
Qty: 90 TABLET | Refills: 3 | Status: SHIPPED | OUTPATIENT
Start: 2023-09-25

## 2023-09-25 RX ORDER — CIPROFLOXACIN 500 MG/1
500 TABLET, FILM COATED ORAL 2 TIMES DAILY
Qty: 20 TABLET | Refills: 0 | Status: SHIPPED | OUTPATIENT
Start: 2023-09-25 | End: 2023-11-27

## 2023-09-25 ASSESSMENT — PAIN SCALES - GENERAL: PAINLEVEL: MILD PAIN (3)

## 2023-09-25 NOTE — LETTER
9/25/2023         RE: Mona Wagner  [address]  [facility] [address]        Dear Colleague,    Thank you for referring your patient, Mona Wagner, to the [facility]. Please see a copy of my visit note below.        TRANSPLANT NEPHROLOGY CHRONIC POST TRANSPLANT VISIT    Assessment & Plan  # DDKT: Stable creatinine in the ~ 1.1-1.3 range over the last 6 months since mild CAMERON felt secondary to preeclampsia with recent pregnancy.  This is up from previous baseline closer to ~ 0.8-1.1.  In addition, patient also has had transplant ureteral stenosis, s/p PNT placement.   - Baseline Creatinine:  ~ 1.1-1.3   - Proteinuria: Minimal (0.2-0.5 grams)   - Date DSA Last Checked: Sep/2022      Latest DSA: No cPRA: 25%   - BK Viremia: No   - Kidney Tx Biopsy: Sep 17, 2019; Result: No diagnostic evidence of acute rejection.    # Immunosuppression: Tacrolimus immediate release (goal 4-6) and Mycophenolic acid (dose 540 mg every 12 hours)   - Continue with intensive monitoring of immunosuppression for efficacy and toxicity.   - Changes: Not at this time; Recently changed off azathioprine back to mycophenolic acid following pregnancy.    # Infection Prophylaxis:   Last CD4 Level: 201 (Jul/2020)  - PJP: None    # Blood Pressure: Controlled;  Goal BP: > 100, but < 130 systolic   - Changes: Not at this time; Patient was on Florinef prior to pregnancy.  Would encourage good hydration.    # Anemia in Chronic Renal Disease: Hgb: Stable      CHARLINE: No   - Iron studies: Replete    # Mineral Bone Disorder: Patient is s/p parathyroidectomy 4/2023 with 3.5 glands removed.  - Tertiary renal hyperparathyroidism; PTH level:  Low (<15 pg/ml)         On treatment: None; Calcitriol stopped with high normal serum calcium level and low PTH.  - Vitamin D; level: Not checked recently        On supplement: Yes  - Calcium; level: Normal        On supplement: Yes    # Electrolytes:   - Potassium; level: Normal        On  supplement: No  - Magnesium; level: Not checked recently        On supplement: Yes  - Bicarbonate; level: Stable low        On supplement: Yes    # Kidney Transplant Ureteral Stenosis/Hydronephrosis: Patient with moderate hydronephrosis of kidney transplant and developed CAMERON.  She underwent PNT 6/9/23 with repeat kidney transplant ultrasound 6/11 still showing moderate hydronephrosis suggesting this may be a functional hydronephrosis from pregnancy.  However, patient does report resolution of edema and dyspnea, as well as apparent post-obstructive diuresis.  She has a previous h/o ureteral stenosis with ureteral stent removed 2/2021.  Previous kidney transplant ultrasound 7/2021 also showed moderate hydronephrosis unchanged with voiding. Hydronephrosis was unchanged on 7/6/22 abdominal CT.  Now s/p PNT exchange 8/14/23 with plans to due q3 months stent exchanges.  Patient would really like to get the PNT out.   - Will refer patient back to Transplant Surgery and Urology to determine long-term plan.    # Recurrent UTI/Allograft Pain: Patient with pyuria and now appears to have symptoms of a UTI.   - Will start ciprofloxacin 500 mg bid x 10 days.   - Also, with recent C. Diff infection, will start oral vancomycin 125 mg bid x 12 days for prophylaxis.    # Pregnancy/Preeclampsia: Patient delivered at 32 weeks 6 days with some preeclampsia symptoms.  Proteinuria and decreased and other symptoms have resolved.    # GERD: Asymptomatic and now off medications.    # Hyperprolactinemia: Normal prolactin level with last check.  Felt secondary to hypothyroidism versus kidney failure, or less likely now a pituitary microadenoma.  Followed by Endocrinology.    # Right Axillary Lymph Node: Patient was referred to general surgery for excisional biopsy in 8/2022, but decision was made to watch the node.  Node appears to be stable for the last year at least and per patient she has had it for > 5 years with no changes in size.    #  Skin Cancer Risk:    - Discussed sun protection and recommend regular follow up with Dermatology.    # Medical Compliance: Yes    # Health Maintenance and Vaccination Review: Not Reviewed    # Transplant History:  Etiology of Kidney Failure: Unknown etiology (no kidney biopsy)  Tx: DDKT  Transplant: 8/3/2019 (Kidney)  Significant changes in immunosuppression:  Changed from mycophenolate to azathioprine for pregnancy.  Significant transplant-related complications: Transplant ureteral stenosis    Transplant Office Phone Number: [phone]    Assessment and plan was discussed with the patient and she voiced her understanding and agreement.    Return visit: Return in about 3 months (around 12/25/2023) for Follow up with Dr. Mcintyre.    Nakul Hill MD    Chief Complaint  Ms. Wagner is a 32 year old here for kidney transplant and immunosuppression management.    History of Present Illness   Ms. Wagner reports feeling okay overall with some medical complaints.  Patient had a recent hospital admission at outside hospital for acute cholecystitis and clostridium difficile colitis.  It was felt she could be managed medically and was put on antibiotics including oral vancomycin.  Symptoms have mostly resolved.  She also more recently had a urinalysis that was suggestive of a UTI, but she had no symptoms at the time, so it was elected not to treat her.  However, patient now complains of one day history of nausea and pain over her kidney transplant.  No fever, but sweating and clammy yesterday.  No pain or burning with urination.  She still has her PNT in place and hopes to get it out soon.  At the moment the plan is stent exchange in 3 months.    Her energy level is okay to a bit improved, but not quite back to normal.  She is certainly sleeping less with her new born baby.  Patient is active, although minimal exercise at this time with her baby.  Denies any chest pain or shortness of breath with exertion.  No leg  swelling.    Appetite is good and she is trying to follow a low fat diet because of the cholecystitis.  Stable weight.  As noted above, she has had a little nausea over the last day, but no vomiting.  One loose stool yesterday, but generally has been normal lately.  No heartburn symptoms.  No night sweats.    Home BP:  100/60s with no lightheadedness.   She does report feeling a bit dehydrated at times.    Problem List  Patient Active Problem List   Diagnosis    DVT of upper extremity (deep vein thrombosis) (H)    Seizure disorder (H)    Acquired hypothyroidism    Hypomagnesemia    Aftercare following organ transplant    Immunosuppressed status (H24)    Kidney replaced by transplant    Anxiety    Vitamin D deficiency    CKD (chronic kidney disease) stage 2, GFR 60-89 ml/min    Diarrhea    Bicornate uterus    Hydronephrosis of kidney transplant    Ureteral stenosis    Anemia in chronic renal disease    Clostridium difficile colitis    Metabolic acidosis       Allergies  Allergies   Allergen Reactions    Contrast Dye Rash     CT contrast allergy 12/14/19 rash over eyes. Need to have pre medication before a CT WITH CONTRAST     Diatrizoate Rash     CT contrast allergy 12/14/19 rash over eyes. Need to have pre medication before a CT WITH CONTRAST     Lisinopril Swelling     angioedema    Nitrous Oxide Other (See Comments)     Sense of doom    Chlorhexidine Rash     Rash at site    Sulfa Antibiotics Rash     Muscle stiffness of neck    Dapsone      Methemoglobinemia    Furosemide Other (See Comments)     Skin flushing    Metrogel [Metronidazole]      Hives diffusely on body after vaginal administration.    Azithromycin Dizziness and Rash    Cefuroxime Rash       Medications  Current Outpatient Medications   Medication Sig    ciprofloxacin (CIPRO) 500 MG tablet Take 1 tablet (500 mg) by mouth 2 times daily    magnesium oxide (MAG-OX) 400 MG tablet Take 1 tablet (400 mg) by mouth every morning    vancomycin (VANCOCIN) 125  MG capsule Take 1 capsule (125 mg) by mouth 4 times daily    acetaminophen (TYLENOL) 325 MG tablet Take 2 tablets (650 mg) by mouth every 4 hours as needed for mild pain    cefpodoxime (VANTIN) 200 MG tablet Take 1 tablet (200 mg) by mouth 2 times daily (Patient not taking: Reported on 10/6/2023)    fluticasone (FLONASE) 50 MCG/ACT nasal spray Spray 1 spray into both nostrils daily    lactase (LACTAID) 3000 UNIT tablet Take 3,000 Units by mouth 3 times daily (with meals)    levothyroxine (SYNTHROID/LEVOTHROID) 25 MCG tablet Take 1 tablet (25 mcg) by mouth daily    mycophenolic acid (GENERIC EQUIVALENT) 180 MG EC tablet Take 3 tablets (540 mg) by mouth 2 times daily    norethindrone (MICRONOR) 0.35 MG tablet Take 1 tablet (0.35 mg) by mouth daily    patiromer (VELTASSA) 8.4 g packet Take 8.4 g by mouth daily as needed (as directed by your transplant team, for potassium = or > 5.5) (Patient not taking: Reported on 9/21/2023)    polyethylene glycol (MIRALAX) 17 GM/Dose powder Take 17 g (1 capful) by mouth daily as needed for constipation    simethicone (MYLICON) 125 MG chewable tablet Take 125 mg by mouth daily    sodium bicarbonate 650 MG tablet Take 2 tablets (1,300 mg) by mouth 3 times daily for 90 days (Patient taking differently: Take 1,950 mg by mouth 2 times daily)    tacrolimus (GENERIC EQUIVALENT) 1 MG capsule Take 3 capsules (3 mg) by mouth 2 times daily    tacrolimus (GENERIC) 0.5 MG capsule Take 0.5 mg by mouth 2 times daily Pt states taking 3.5 mg in morning and 3mg at night    vancomycin (VANCOCIN) 125 MG capsule Take 1 capsule (125 mg) by mouth 4 times daily    vancomycin (VANCOCIN) 125 MG capsule Take 1 capsule (125 mg) by mouth 4 times daily    vitamin D3 (CHOLECALCIFEROL) 50 mcg (2000 units) tablet Take 1 tablet (50 mcg) by mouth daily for 90 days (Patient taking differently: Take 1 tablet by mouth every morning)     No current facility-administered medications for this visit.      Facility-Administered Medications Ordered in Other Visits   Medication    iopamidol (ISOVUE-250) solution 100 mL     Medications Discontinued During This Encounter   Medication Reason    magnesium oxide (MAG-OX) 400 MG tablet Reorder (No AVS)       Physical Exam  Vital Signs: Ht 1.524 m (5')   Wt 69.4 kg (153 lb)   LMP 12/16/2022   BMI 29.88 kg/m      GENERAL APPEARANCE: alert and no distress  HENT: no obvious abnormalities on appearance  RESP: breathing appears unremarkable with normal rate, no audible wheezing or cough and no apparent shortness of breath with conversation  MS: extremities normal - no gross deformities noted  SKIN: no apparent rash and normal skin tone  NEURO: speech is clear with no obvious neurological deficits  PSYCH: mentation appears normal and affect normal    Data        Latest Ref Rng & Units 10/18/2023     9:32 AM 10/13/2023     9:35 AM 10/9/2023     9:33 AM   Renal   Sodium 135 - 145 mmol/L 137  138  138    K 3.4 - 5.3 mmol/L 4.3  4.2  4.4    Cl 98 - 107 mmol/L 102  103  103    Cl (external) 98 - 107 mmol/L 102  103  103    CO2 22 - 29 mmol/L 23  24  23    Urea Nitrogen 6.0 - 20.0 mg/dL 17.7  22.6  21.2    Creatinine 0.51 - 0.95 mg/dL 1.12  1.20  1.35    Glucose 70 - 99 mg/dL 107  108  103    Calcium 8.6 - 10.0 mg/dL 9.6  9.5  9.1          Latest Ref Rng & Units 10/3/2023     9:52 AM 7/13/2023    10:15 AM 6/15/2023    10:09 AM   Bone Health   Phosphorus 2.5 - 4.5 mg/dL  3.3  3.6    Vit D Def 20 - 50 ng/mL 46            Latest Ref Rng & Units 10/18/2023     9:32 AM 10/13/2023     9:35 AM 10/9/2023     9:33 AM   Heme   WBC 4.0 - 11.0 10e3/uL 6.8  6.3  5.5    Hgb 11.7 - 15.7 g/dL 11.9  11.7  10.8    Plt 150 - 450 10e3/uL 304  329  249          Latest Ref Rng & Units 9/8/2023     2:34 AM 8/14/2023    12:49 PM 8/13/2023     6:33 AM   Liver   AP 35 - 104 U/L 79  112  104    TBili <=1.2 mg/dL 0.5  0.7  0.8    ALT 0 - 50 U/L 15  36  30    AST 0 - 45 U/L 19  36  29    Tot Protein 6.4 - 8.3  g/dL 7.2  6.3  5.9    Albumin 3.5 - 5.2 g/dL 4.1  3.2  2.9          Latest Ref Rng & Units 10/6/2023     9:41 AM 2/6/2023    10:23 AM 6/22/2022     6:39 AM   Pancreas   A1C <5.7 % 5.7  5.2     Lipase 0 - 52 U/L   39          Latest Ref Rng & Units 10/18/2023     9:32 AM 10/3/2023     9:52 AM 7/31/2023    10:17 AM   Iron studies   Iron 37 - 145 ug/dL 56  40  94    Iron Sat Index 15 - 46 % 26  18  35    Ferritin 6 - 175 ng/mL 771  808  1,525          Latest Ref Rng & Units 7/7/2021     9:20 AM 6/2/2021     9:25 AM 5/3/2021     9:02 AM   [facility] Txp Virology   BK Spec  Plasma  Plasma  Plasma    BK Res BKNEG^BK Virus DNA Not Detected copies/mL BK Virus DNA Not Detected  BK Virus DNA Not Detected  BK Virus DNA Not Detected    BK Log <2.7 Log copies/mL Not Calculated  Not Calculated  Not Calculated        Recent Labs   Lab Test 10/09/23  0933 10/13/23  0935 10/18/23  0932   DOSTAC 10/8/2023 10/12/2023 10/17/2023   TACROL 4.7* 4.8* 5.5     Recent Labs   Lab Test 08/16/19  0807 09/03/19  0944   DOSMPA Not Provided 0840pm   MPACID 1.92 3.39   MPAG 149.2* 52.8         Again, thank you for allowing me to participate in the care of your patient.        Sincerely,        Nakul Hill MD

## 2023-09-25 NOTE — NURSING NOTE
Is the patient currently in the state of [place]? YES    Visit mode:VIDEO    If the visit is dropped, the patient can be reconnected by: VIDEO VISIT: Send to e-mail at: [email or web address]    Will anyone else be joining the visit? NO  (If patient encounters technical issues they should call [phone]    How would you like to obtain your AVS? MyChart    Are changes needed to the allergy or medication list? No    Reason for visit: ONDINA CUNHA

## 2023-09-25 NOTE — PROGRESS NOTES
Virtual Visit Details    Type of service:  Video Visit   Video Start Time:  1103  Video End Time: 1126    Originating Location (pt. Location): Home  Distant Location (provider location):  Off-site  Platform used for Video Visit: [facility]      TRANSPLANT NEPHROLOGY CHRONIC POST TRANSPLANT VISIT    Assessment & Plan   # DDKT: Stable creatinine in the ~ 1.1-1.3 range over the last 6 months since mild CAMERON felt secondary to preeclampsia with recent pregnancy.  This is up from previous baseline closer to ~ 0.8-1.1.  In addition, patient also has had transplant ureteral stenosis, s/p PNT placement.   - Baseline Creatinine:  ~ 1.1-1.3   - Proteinuria: Minimal (0.2-0.5 grams)   - Date DSA Last Checked: Sep/2022      Latest DSA: No cPRA: 25%   - BK Viremia: No   - Kidney Tx Biopsy: Sep 17, 2019; Result: No diagnostic evidence of acute rejection.    # Immunosuppression: Tacrolimus immediate release (goal 4-6) and Mycophenolic acid (dose 540 mg every 12 hours)   - Continue with intensive monitoring of immunosuppression for efficacy and toxicity.   - Changes: Not at this time; Recently changed off azathioprine back to mycophenolic acid following pregnancy.    # Infection Prophylaxis:   Last CD4 Level: 201 (Jul/2020)  - PJP: None    # Blood Pressure: Controlled;  Goal BP: > 100, but < 130 systolic   - Changes: Not at this time; Patient was on Florinef prior to pregnancy.  Would encourage good hydration.    # Anemia in Chronic Renal Disease: Hgb: Stable      CHARLINE: No   - Iron studies: Replete    # Mineral Bone Disorder: Patient is s/p parathyroidectomy 4/2023 with 3.5 glands removed.  - Tertiary renal hyperparathyroidism; PTH level:  Low (<15 pg/ml)         On treatment: None; Calcitriol stopped with high normal serum calcium level and low PTH.  - Vitamin D; level: Not checked recently        On supplement: Yes  - Calcium; level: Normal        On supplement: Yes    # Electrolytes:   - Potassium; level: Normal        On supplement:  No  - Magnesium; level: Not checked recently        On supplement: Yes  - Bicarbonate; level: Stable low        On supplement: Yes    # Kidney Transplant Ureteral Stenosis/Hydronephrosis: Patient with moderate hydronephrosis of kidney transplant and developed CAMERON.  She underwent PNT 6/9/23 with repeat kidney transplant ultrasound 6/11 still showing moderate hydronephrosis suggesting this may be a functional hydronephrosis from pregnancy.  However, patient does report resolution of edema and dyspnea, as well as apparent post-obstructive diuresis.  She has a previous h/o ureteral stenosis with ureteral stent removed 2/2021.  Previous kidney transplant ultrasound 7/2021 also showed moderate hydronephrosis unchanged with voiding. Hydronephrosis was unchanged on 7/6/22 abdominal CT.  Now s/p PNT exchange 8/14/23 with plans to due q3 months stent exchanges.  Patient would really like to get the PNT out.   - Will refer patient back to Transplant Surgery and Urology to determine long-term plan.    # Recurrent UTI/Allograft Pain: Patient with pyuria and now appears to have symptoms of a UTI.   - Will start ciprofloxacin 500 mg bid x 10 days.   - Also, with recent C. Diff infection, will start oral vancomycin 125 mg bid x 12 days for prophylaxis.    # Pregnancy/Preeclampsia: Patient delivered at 32 weeks 6 days with some preeclampsia symptoms.  Proteinuria and decreased and other symptoms have resolved.    # GERD: Asymptomatic and now off medications.    # Hyperprolactinemia: Normal prolactin level with last check.  Felt secondary to hypothyroidism versus kidney failure, or less likely now a pituitary microadenoma.  Followed by Endocrinology.    # Right Axillary Lymph Node: Patient was referred to general surgery for excisional biopsy in 8/2022, but decision was made to watch the node.  Node appears to be stable for the last year at least and per patient she has had it for > 5 years with no changes in size.    # Skin Cancer  Risk:    - Discussed sun protection and recommend regular follow up with Dermatology.    # Medical Compliance: Yes    # Health Maintenance and Vaccination Review: Not Reviewed    # Transplant History:  Etiology of Kidney Failure: Unknown etiology (no kidney biopsy)  Tx: DDKT  Transplant: 8/3/2019 (Kidney)  Significant changes in immunosuppression:  Changed from mycophenolate to azathioprine for pregnancy.  Significant transplant-related complications: Transplant ureteral stenosis    Transplant Office Phone Number: [phone]    Assessment and plan was discussed with the patient and she voiced her understanding and agreement.    Return visit: Return in about 3 months (around 12/25/2023) for Follow up with Dr. Mcintyre.    Nakul Hill MD    Chief Complaint   Ms. Wagner is a 32 year old here for kidney transplant and immunosuppression management.    History of Present Illness    Ms. Wagner reports feeling okay overall with some medical complaints.  Patient had a recent hospital admission at outside hospital for acute cholecystitis and clostridium difficile colitis.  It was felt she could be managed medically and was put on antibiotics including oral vancomycin.  Symptoms have mostly resolved.  She also more recently had a urinalysis that was suggestive of a UTI, but she had no symptoms at the time, so it was elected not to treat her.  However, patient now complains of one day history of nausea and pain over her kidney transplant.  No fever, but sweating and clammy yesterday.  No pain or burning with urination.  She still has her PNT in place and hopes to get it out soon.  At the moment the plan is stent exchange in 3 months.    Her energy level is okay to a bit improved, but not quite back to normal.  She is certainly sleeping less with her new born baby.  Patient is active, although minimal exercise at this time with her baby.  Denies any chest pain or shortness of breath with exertion.  No leg  swelling.    Appetite is good and she is trying to follow a low fat diet because of the cholecystitis.  Stable weight.  As noted above, she has had a little nausea over the last day, but no vomiting.  One loose stool yesterday, but generally has been normal lately.  No heartburn symptoms.  No night sweats.    Home BP:  100/60s with no lightheadedness.   She does report feeling a bit dehydrated at times.    Problem List   Patient Active Problem List   Diagnosis    DVT of upper extremity (deep vein thrombosis) (H)    Seizure disorder (H)    Acquired hypothyroidism    Hypomagnesemia    Aftercare following organ transplant    Immunosuppressed status (H24)    Kidney replaced by transplant    Anxiety    Vitamin D deficiency    CKD (chronic kidney disease) stage 2, GFR 60-89 ml/min    Diarrhea    Bicornate uterus    Hydronephrosis of kidney transplant    Ureteral stenosis    Anemia in chronic renal disease    Clostridium difficile colitis    Metabolic acidosis       Allergies   Allergies   Allergen Reactions    Contrast Dye Rash     CT contrast allergy 12/14/19 rash over eyes. Need to have pre medication before a CT WITH CONTRAST     Diatrizoate Rash     CT contrast allergy 12/14/19 rash over eyes. Need to have pre medication before a CT WITH CONTRAST     Lisinopril Swelling     angioedema    Nitrous Oxide Other (See Comments)     Sense of doom    Chlorhexidine Rash     Rash at site    Sulfa Antibiotics Rash     Muscle stiffness of neck    Dapsone      Methemoglobinemia    Furosemide Other (See Comments)     Skin flushing    Metrogel [Metronidazole]      Hives diffusely on body after vaginal administration.    Azithromycin Dizziness and Rash    Cefuroxime Rash       Medications   Current Outpatient Medications   Medication Sig    ciprofloxacin (CIPRO) 500 MG tablet Take 1 tablet (500 mg) by mouth 2 times daily    magnesium oxide (MAG-OX) 400 MG tablet Take 1 tablet (400 mg) by mouth every morning    vancomycin (VANCOCIN)  125 MG capsule Take 1 capsule (125 mg) by mouth 4 times daily    acetaminophen (TYLENOL) 325 MG tablet Take 2 tablets (650 mg) by mouth every 4 hours as needed for mild pain    cefpodoxime (VANTIN) 200 MG tablet Take 1 tablet (200 mg) by mouth 2 times daily (Patient not taking: Reported on 10/6/2023)    fluticasone (FLONASE) 50 MCG/ACT nasal spray Spray 1 spray into both nostrils daily    lactase (LACTAID) 3000 UNIT tablet Take 3,000 Units by mouth 3 times daily (with meals)    levothyroxine (SYNTHROID/LEVOTHROID) 25 MCG tablet Take 1 tablet (25 mcg) by mouth daily    mycophenolic acid (GENERIC EQUIVALENT) 180 MG EC tablet Take 3 tablets (540 mg) by mouth 2 times daily    norethindrone (MICRONOR) 0.35 MG tablet Take 1 tablet (0.35 mg) by mouth daily    patiromer (VELTASSA) 8.4 g packet Take 8.4 g by mouth daily as needed (as directed by your transplant team, for potassium = or > 5.5) (Patient not taking: Reported on 9/21/2023)    polyethylene glycol (MIRALAX) 17 GM/Dose powder Take 17 g (1 capful) by mouth daily as needed for constipation    simethicone (MYLICON) 125 MG chewable tablet Take 125 mg by mouth daily    sodium bicarbonate 650 MG tablet Take 2 tablets (1,300 mg) by mouth 3 times daily for 90 days (Patient taking differently: Take 1,950 mg by mouth 2 times daily)    tacrolimus (GENERIC EQUIVALENT) 1 MG capsule Take 3 capsules (3 mg) by mouth 2 times daily    tacrolimus (GENERIC) 0.5 MG capsule Take 0.5 mg by mouth 2 times daily Pt states taking 3.5 mg in morning and 3mg at night    vancomycin (VANCOCIN) 125 MG capsule Take 1 capsule (125 mg) by mouth 4 times daily    vancomycin (VANCOCIN) 125 MG capsule Take 1 capsule (125 mg) by mouth 4 times daily    vitamin D3 (CHOLECALCIFEROL) 50 mcg (2000 units) tablet Take 1 tablet (50 mcg) by mouth daily for 90 days (Patient taking differently: Take 1 tablet by mouth every morning)     No current facility-administered medications for this visit.      Facility-Administered Medications Ordered in Other Visits   Medication    iopamidol (ISOVUE-250) solution 100 mL     Medications Discontinued During This Encounter   Medication Reason    magnesium oxide (MAG-OX) 400 MG tablet Reorder (No AVS)       Physical Exam   Vital Signs: Ht 1.524 m (5')   Wt 69.4 kg (153 lb)   LMP 12/16/2022   BMI 29.88 kg/m      GENERAL APPEARANCE: alert and no distress  HENT: no obvious abnormalities on appearance  RESP: breathing appears unremarkable with normal rate, no audible wheezing or cough and no apparent shortness of breath with conversation  MS: extremities normal - no gross deformities noted  SKIN: no apparent rash and normal skin tone  NEURO: speech is clear with no obvious neurological deficits  PSYCH: mentation appears normal and affect normal    Data         Latest Ref Rng & Units 10/18/2023     9:32 AM 10/13/2023     9:35 AM 10/9/2023     9:33 AM   Renal   Sodium 135 - 145 mmol/L 137  138  138    K 3.4 - 5.3 mmol/L 4.3  4.2  4.4    Cl 98 - 107 mmol/L 102  103  103    Cl (external) 98 - 107 mmol/L 102  103  103    CO2 22 - 29 mmol/L 23  24  23    Urea Nitrogen 6.0 - 20.0 mg/dL 17.7  22.6  21.2    Creatinine 0.51 - 0.95 mg/dL 1.12  1.20  1.35    Glucose 70 - 99 mg/dL 107  108  103    Calcium 8.6 - 10.0 mg/dL 9.6  9.5  9.1          Latest Ref Rng & Units 10/3/2023     9:52 AM 7/13/2023    10:15 AM 6/15/2023    10:09 AM   Bone Health   Phosphorus 2.5 - 4.5 mg/dL  3.3  3.6    Vit D Def 20 - 50 ng/mL 46            Latest Ref Rng & Units 10/18/2023     9:32 AM 10/13/2023     9:35 AM 10/9/2023     9:33 AM   Heme   WBC 4.0 - 11.0 10e3/uL 6.8  6.3  5.5    Hgb 11.7 - 15.7 g/dL 11.9  11.7  10.8    Plt 150 - 450 10e3/uL 304  329  249          Latest Ref Rng & Units 9/8/2023     2:34 AM 8/14/2023    12:49 PM 8/13/2023     6:33 AM   Liver   AP 35 - 104 U/L 79  112  104    TBili <=1.2 mg/dL 0.5  0.7  0.8    ALT 0 - 50 U/L 15  36  30    AST 0 - 45 U/L 19  36  29    Tot Protein 6.4 -  8.3 g/dL 7.2  6.3  5.9    Albumin 3.5 - 5.2 g/dL 4.1  3.2  2.9          Latest Ref Rng & Units 10/6/2023     9:41 AM 2/6/2023    10:23 AM 6/22/2022     6:39 AM   Pancreas   A1C <5.7 % 5.7  5.2     Lipase 0 - 52 U/L   39          Latest Ref Rng & Units 10/18/2023     9:32 AM 10/3/2023     9:52 AM 7/31/2023    10:17 AM   Iron studies   Iron 37 - 145 ug/dL 56  40  94    Iron Sat Index 15 - 46 % 26  18  35    Ferritin 6 - 175 ng/mL 771  808  1,525          Latest Ref Rng & Units 7/7/2021     9:20 AM 6/2/2021     9:25 AM 5/3/2021     9:02 AM   [facility] Virology   BK Spec  Plasma  Plasma  Plasma    BK Res BKNEG^BK Virus DNA Not Detected copies/mL BK Virus DNA Not Detected  BK Virus DNA Not Detected  BK Virus DNA Not Detected    BK Log <2.7 Log copies/mL Not Calculated  Not Calculated  Not Calculated        Recent Labs   Lab Test 10/09/23  0933 10/13/23  0935 10/18/23  0932   DOSTAC 10/8/2023 10/12/2023 10/17/2023   TACROL 4.7* 4.8* 5.5     Recent Labs   Lab Test 08/16/19  0807 09/03/19  0944   DOSMPA Not Provided 0840pm   MPACID 1.92 3.39   MPAG 149.2* 52.8

## 2023-09-25 NOTE — LETTER
9/25/2023      RE: Mona Wagner  [address]  [facility] [address]       Virtual Visit Details    Type of service:  Video Visit   Video Start Time:  1103  Video End Time: 1126    Originating Location (pt. Location): Home  Distant Location (provider location):  Off-site  Platform used for Video Visit: [facility]      TRANSPLANT NEPHROLOGY CHRONIC POST TRANSPLANT VISIT    Assessment & Plan  # DDKT: Stable creatinine in the ~ 1.1-1.3 range over the last 6 months since mild CAMERON felt secondary to preeclampsia with recent pregnancy.  This is up from previous baseline closer to ~ 0.8-1.1.  In addition, patient also has had transplant ureteral stenosis, s/p PNT placement.   - Baseline Creatinine:  ~ 1.1-1.3   - Proteinuria: Minimal (0.2-0.5 grams)   - Date DSA Last Checked: Sep/2022      Latest DSA: No cPRA: 25%   - BK Viremia: No   - Kidney Tx Biopsy: Sep 17, 2019; Result: No diagnostic evidence of acute rejection.    # Immunosuppression: Tacrolimus immediate release (goal 4-6) and Mycophenolic acid (dose 540 mg every 12 hours)   - Continue with intensive monitoring of immunosuppression for efficacy and toxicity.   - Changes: Not at this time; Recently changed off azathioprine back to mycophenolic acid following pregnancy.    # Infection Prophylaxis:   Last CD4 Level: 201 (Jul/2020)  - PJP: None    # Blood Pressure: Controlled;  Goal BP: > 100, but < 130 systolic   - Changes: Not at this time; Patient was on Florinef prior to pregnancy.  Would encourage good hydration.    # Anemia in Chronic Renal Disease: Hgb: Stable      CHARLINE: No   - Iron studies: Replete    # Mineral Bone Disorder: Patient is s/p parathyroidectomy 4/2023 with 3.5 glands removed.  - Tertiary renal hyperparathyroidism; PTH level:  Low (<15 pg/ml)         On treatment: None; Calcitriol stopped with high normal serum calcium level and low PTH.  - Vitamin D; level: Not checked recently        On supplement: Yes  - Calcium; level: Normal        On  supplement: Yes    # Electrolytes:   - Potassium; level: Normal        On supplement: No  - Magnesium; level: Not checked recently        On supplement: Yes  - Bicarbonate; level: Stable low        On supplement: Yes    # Kidney Transplant Ureteral Stenosis/Hydronephrosis: Patient with moderate hydronephrosis of kidney transplant and developed CAMERON.  She underwent PNT 6/9/23 with repeat kidney transplant ultrasound 6/11 still showing moderate hydronephrosis suggesting this may be a functional hydronephrosis from pregnancy.  However, patient does report resolution of edema and dyspnea, as well as apparent post-obstructive diuresis.  She has a previous h/o ureteral stenosis with ureteral stent removed 2/2021.  Previous kidney transplant ultrasound 7/2021 also showed moderate hydronephrosis unchanged with voiding. Hydronephrosis was unchanged on 7/6/22 abdominal CT.  Now s/p PNT exchange 8/14/23 with plans to due q3 months stent exchanges.  Patient would really like to get the PNT out.   - Will refer patient back to Transplant Surgery and Urology to determine long-term plan.    # Recurrent UTI/Allograft Pain: Patient with pyuria and now appears to have symptoms of a UTI.   - Will start ciprofloxacin 500 mg bid x 10 days.   - Also, with recent C. Diff infection, will start oral vancomycin 125 mg bid x 12 days for prophylaxis.    # Pregnancy/Preeclampsia: Patient delivered at 32 weeks 6 days with some preeclampsia symptoms.  Proteinuria and decreased and other symptoms have resolved.    # GERD: Asymptomatic and now off medications.    # Hyperprolactinemia: Normal prolactin level with last check.  Felt secondary to hypothyroidism versus kidney failure, or less likely now a pituitary microadenoma.  Followed by Endocrinology.    # Right Axillary Lymph Node: Patient was referred to general surgery for excisional biopsy in 8/2022, but decision was made to watch the node.  Node appears to be stable for the last year at least  and per patient she has had it for > 5 years with no changes in size.    # Skin Cancer Risk:    - Discussed sun protection and recommend regular follow up with Dermatology.    # Medical Compliance: Yes    # Health Maintenance and Vaccination Review: Not Reviewed    # Transplant History:  Etiology of Kidney Failure: Unknown etiology (no kidney biopsy)  Tx: DDKT  Transplant: 8/3/2019 (Kidney)  Significant changes in immunosuppression:  Changed from mycophenolate to azathioprine for pregnancy.  Significant transplant-related complications: Transplant ureteral stenosis    Transplant Office Phone Number: [phone]    Assessment and plan was discussed with the patient and she voiced her understanding and agreement.    Return visit: Return in about 3 months (around 12/25/2023) for Follow up with Dr. Mcintyre.    Nakul Hill MD    Chief Complaint  Ms. Wagner is a 32 year old here for kidney transplant and immunosuppression management.    History of Present Illness   Ms. Wagner reports feeling okay overall with some medical complaints.  Patient had a recent hospital admission at outside hospital for acute cholecystitis and clostridium difficile colitis.  It was felt she could be managed medically and was put on antibiotics including oral vancomycin.  Symptoms have mostly resolved.  She also more recently had a urinalysis that was suggestive of a UTI, but she had no symptoms at the time, so it was elected not to treat her.  However, patient now complains of one day history of nausea and pain over her kidney transplant.  No fever, but sweating and clammy yesterday.  No pain or burning with urination.  She still has her PNT in place and hopes to get it out soon.  At the moment the plan is stent exchange in 3 months.    Her energy level is okay to a bit improved, but not quite back to normal.  She is certainly sleeping less with her new born baby.  Patient is active, although minimal exercise at this time with her baby.   Denies any chest pain or shortness of breath with exertion.  No leg swelling.    Appetite is good and she is trying to follow a low fat diet because of the cholecystitis.  Stable weight.  As noted above, she has had a little nausea over the last day, but no vomiting.  One loose stool yesterday, but generally has been normal lately.  No heartburn symptoms.  No night sweats.    Home BP:  100/60s with no lightheadedness.   She does report feeling a bit dehydrated at times.    Problem List  Patient Active Problem List   Diagnosis     DVT of upper extremity (deep vein thrombosis) (H)     Seizure disorder (H)     Acquired hypothyroidism     Hypomagnesemia     Aftercare following organ transplant     Immunosuppressed status (H24)     Kidney replaced by transplant     Anxiety     Vitamin D deficiency     CKD (chronic kidney disease) stage 2, GFR 60-89 ml/min     Diarrhea     Bicornate uterus     Hydronephrosis of kidney transplant     Ureteral stenosis     Anemia in chronic renal disease     Clostridium difficile colitis     Metabolic acidosis       Allergies  Allergies   Allergen Reactions     Contrast Dye Rash     CT contrast allergy 12/14/19 rash over eyes. Need to have pre medication before a CT WITH CONTRAST      Diatrizoate Rash     CT contrast allergy 12/14/19 rash over eyes. Need to have pre medication before a CT WITH CONTRAST      Lisinopril Swelling     angioedema     Nitrous Oxide Other (See Comments)     Sense of doom     Chlorhexidine Rash     Rash at site     Sulfa Antibiotics Rash     Muscle stiffness of neck     Dapsone      Methemoglobinemia     Furosemide Other (See Comments)     Skin flushing     Metrogel [Metronidazole]      Hives diffusely on body after vaginal administration.     Azithromycin Dizziness and Rash     Cefuroxime Rash       Medications  Current Outpatient Medications   Medication Sig     ciprofloxacin (CIPRO) 500 MG tablet Take 1 tablet (500 mg) by mouth 2 times daily     magnesium oxide  (MAG-OX) 400 MG tablet Take 1 tablet (400 mg) by mouth every morning     vancomycin (VANCOCIN) 125 MG capsule Take 1 capsule (125 mg) by mouth 4 times daily     acetaminophen (TYLENOL) 325 MG tablet Take 2 tablets (650 mg) by mouth every 4 hours as needed for mild pain     cefpodoxime (VANTIN) 200 MG tablet Take 1 tablet (200 mg) by mouth 2 times daily (Patient not taking: Reported on 10/6/2023)     fluticasone (FLONASE) 50 MCG/ACT nasal spray Spray 1 spray into both nostrils daily     lactase (LACTAID) 3000 UNIT tablet Take 3,000 Units by mouth 3 times daily (with meals)     levothyroxine (SYNTHROID/LEVOTHROID) 25 MCG tablet Take 1 tablet (25 mcg) by mouth daily     mycophenolic acid (GENERIC EQUIVALENT) 180 MG EC tablet Take 3 tablets (540 mg) by mouth 2 times daily     norethindrone (MICRONOR) 0.35 MG tablet Take 1 tablet (0.35 mg) by mouth daily     patiromer (VELTASSA) 8.4 g packet Take 8.4 g by mouth daily as needed (as directed by your transplant team, for potassium = or > 5.5) (Patient not taking: Reported on 9/21/2023)     polyethylene glycol (MIRALAX) 17 GM/Dose powder Take 17 g (1 capful) by mouth daily as needed for constipation     simethicone (MYLICON) 125 MG chewable tablet Take 125 mg by mouth daily     sodium bicarbonate 650 MG tablet Take 2 tablets (1,300 mg) by mouth 3 times daily for 90 days (Patient taking differently: Take 1,950 mg by mouth 2 times daily)     tacrolimus (GENERIC EQUIVALENT) 1 MG capsule Take 3 capsules (3 mg) by mouth 2 times daily     tacrolimus (GENERIC) 0.5 MG capsule Take 0.5 mg by mouth 2 times daily Pt states taking 3.5 mg in morning and 3mg at night     vancomycin (VANCOCIN) 125 MG capsule Take 1 capsule (125 mg) by mouth 4 times daily     vancomycin (VANCOCIN) 125 MG capsule Take 1 capsule (125 mg) by mouth 4 times daily     vitamin D3 (CHOLECALCIFEROL) 50 mcg (2000 units) tablet Take 1 tablet (50 mcg) by mouth daily for 90 days (Patient taking differently: Take 1  tablet by mouth every morning)     No current facility-administered medications for this visit.     Facility-Administered Medications Ordered in Other Visits   Medication     iopamidol (ISOVUE-250) solution 100 mL     Medications Discontinued During This Encounter   Medication Reason     magnesium oxide (MAG-OX) 400 MG tablet Reorder (No AVS)       Physical Exam  Vital Signs: Ht 1.524 m (5')   Wt 69.4 kg (153 lb)   LMP 12/16/2022   BMI 29.88 kg/m      GENERAL APPEARANCE: alert and no distress  HENT: no obvious abnormalities on appearance  RESP: breathing appears unremarkable with normal rate, no audible wheezing or cough and no apparent shortness of breath with conversation  MS: extremities normal - no gross deformities noted  SKIN: no apparent rash and normal skin tone  NEURO: speech is clear with no obvious neurological deficits  PSYCH: mentation appears normal and affect normal    Data        Latest Ref Rng & Units 10/18/2023     9:32 AM 10/13/2023     9:35 AM 10/9/2023     9:33 AM   Renal   Sodium 135 - 145 mmol/L 137  138  138    K 3.4 - 5.3 mmol/L 4.3  4.2  4.4    Cl 98 - 107 mmol/L 102  103  103    Cl (external) 98 - 107 mmol/L 102  103  103    CO2 22 - 29 mmol/L 23  24  23    Urea Nitrogen 6.0 - 20.0 mg/dL 17.7  22.6  21.2    Creatinine 0.51 - 0.95 mg/dL 1.12  1.20  1.35    Glucose 70 - 99 mg/dL 107  108  103    Calcium 8.6 - 10.0 mg/dL 9.6  9.5  9.1          Latest Ref Rng & Units 10/3/2023     9:52 AM 7/13/2023    10:15 AM 6/15/2023    10:09 AM   Bone Health   Phosphorus 2.5 - 4.5 mg/dL  3.3  3.6    Vit D Def 20 - 50 ng/mL 46            Latest Ref Rng & Units 10/18/2023     9:32 AM 10/13/2023     9:35 AM 10/9/2023     9:33 AM   Heme   WBC 4.0 - 11.0 10e3/uL 6.8  6.3  5.5    Hgb 11.7 - 15.7 g/dL 11.9  11.7  10.8    Plt 150 - 450 10e3/uL 304  329  249          Latest Ref Rng & Units 9/8/2023     2:34 AM 8/14/2023    12:49 PM 8/13/2023     6:33 AM   Liver   AP 35 - 104 U/L 79  112  104    TBili <=1.2 mg/dL  0.5  0.7  0.8    ALT 0 - 50 U/L 15  36  30    AST 0 - 45 U/L 19  36  29    Tot Protein 6.4 - 8.3 g/dL 7.2  6.3  5.9    Albumin 3.5 - 5.2 g/dL 4.1  3.2  2.9          Latest Ref Rng & Units 10/6/2023     9:41 AM 2/6/2023    10:23 AM 6/22/2022     6:39 AM   Pancreas   A1C <5.7 % 5.7  5.2     Lipase 0 - 52 U/L   39          Latest Ref Rng & Units 10/18/2023     9:32 AM 10/3/2023     9:52 AM 7/31/2023    10:17 AM   Iron studies   Iron 37 - 145 ug/dL 56  40  94    Iron Sat Index 15 - 46 % 26  18  35    Ferritin 6 - 175 ng/mL 771  808  1,525          Latest Ref Rng & Units 7/7/2021     9:20 AM 6/2/2021     9:25 AM 5/3/2021     9:02 AM   [facility] Virology   BK Spec  Plasma  Plasma  Plasma    BK Res BKNEG^BK Virus DNA Not Detected copies/mL BK Virus DNA Not Detected  BK Virus DNA Not Detected  BK Virus DNA Not Detected    BK Log <2.7 Log copies/mL Not Calculated  Not Calculated  Not Calculated        Recent Labs   Lab Test 10/09/23  0933 10/13/23  0935 10/18/23  0932   DOSTAC 10/8/2023 10/12/2023 10/17/2023   TACROL 4.7* 4.8* 5.5     Recent Labs   Lab Test 08/16/19  0807 09/03/19  0944   DOSMPA Not Provided 0840pm   MPACID 1.92 3.39   MPAG 149.2* 52.8       Nakul Hill MD

## 2023-09-26 ENCOUNTER — TELEPHONE (OUTPATIENT)
Dept: TRANSPLANT | Facility: CLINIC | Age: 33
End: 2023-09-26

## 2023-09-26 ENCOUNTER — LAB (OUTPATIENT)
Dept: LAB | Facility: HOSPITAL | Age: 33
End: 2023-09-26
Payer: MEDICARE

## 2023-09-26 ENCOUNTER — TELEPHONE (OUTPATIENT)
Dept: ENDOCRINOLOGY | Facility: CLINIC | Age: 33
End: 2023-09-26

## 2023-09-26 DIAGNOSIS — E58 CALCIUM DEFICIENCY: ICD-10-CM

## 2023-09-26 DIAGNOSIS — E03.9 ACQUIRED HYPOTHYROIDISM: ICD-10-CM

## 2023-09-26 DIAGNOSIS — Z48.298 AFTERCARE FOLLOWING ORGAN TRANSPLANT: ICD-10-CM

## 2023-09-26 DIAGNOSIS — Z94.0 KIDNEY REPLACED BY TRANSPLANT: ICD-10-CM

## 2023-09-26 DIAGNOSIS — R80.9 PROTEINURIA: ICD-10-CM

## 2023-09-26 DIAGNOSIS — Z48.298 AFTERCARE FOLLOWING ORGAN TRANSPLANT: Primary | ICD-10-CM

## 2023-09-26 LAB
ALBUMIN MFR UR ELPH: 18.5 MG/DL
ANION GAP SERPL CALCULATED.3IONS-SCNC: 11 MMOL/L (ref 7–15)
BUN SERPL-MCNC: 17.7 MG/DL (ref 6–20)
CALCIUM SERPL-MCNC: 9.5 MG/DL (ref 8.6–10)
CHLORIDE SERPL-SCNC: 102 MMOL/L (ref 98–107)
CREAT SERPL-MCNC: 1.09 MG/DL (ref 0.51–0.95)
CREAT UR-MCNC: 41.5 MG/DL
DEPRECATED HCO3 PLAS-SCNC: 24 MMOL/L (ref 22–29)
EGFRCR SERPLBLD CKD-EPI 2021: 70 ML/MIN/1.73M2
ERYTHROCYTE [DISTWIDTH] IN BLOOD BY AUTOMATED COUNT: 12.6 % (ref 10–15)
GLUCOSE SERPL-MCNC: 107 MG/DL (ref 70–99)
HCT VFR BLD AUTO: 36.5 % (ref 35–47)
HGB BLD-MCNC: 11.9 G/DL (ref 11.7–15.7)
MCH RBC QN AUTO: 29.2 PG (ref 26.5–33)
MCHC RBC AUTO-ENTMCNC: 32.6 G/DL (ref 31.5–36.5)
MCV RBC AUTO: 90 FL (ref 78–100)
PLATELET # BLD AUTO: 221 10E3/UL (ref 150–450)
POTASSIUM SERPL-SCNC: 4.2 MMOL/L (ref 3.4–5.3)
PROT/CREAT 24H UR: 0.45 MG/MG CR (ref 0–0.2)
RBC # BLD AUTO: 4.07 10E6/UL (ref 3.8–5.2)
SODIUM SERPL-SCNC: 137 MMOL/L (ref 136–145)
T3FREE SERPL-MCNC: 2.7 PG/ML (ref 2–4.4)
TACROLIMUS BLD-MCNC: 3.9 UG/L (ref 5–15)
TME LAST DOSE: ABNORMAL H
TME LAST DOSE: ABNORMAL H
TSH SERPL DL<=0.005 MIU/L-ACNC: 1.56 UIU/ML (ref 0.3–4.2)
WBC # BLD AUTO: 6.3 10E3/UL (ref 4–11)

## 2023-09-26 PROCEDURE — 80048 BASIC METABOLIC PNL TOTAL CA: CPT

## 2023-09-26 PROCEDURE — 84443 ASSAY THYROID STIM HORMONE: CPT

## 2023-09-26 PROCEDURE — 80197 ASSAY OF TACROLIMUS: CPT

## 2023-09-26 PROCEDURE — 84481 FREE ASSAY (FT-3): CPT

## 2023-09-26 PROCEDURE — 84156 ASSAY OF PROTEIN URINE: CPT

## 2023-09-26 PROCEDURE — 85027 COMPLETE CBC AUTOMATED: CPT

## 2023-09-26 PROCEDURE — 36415 COLL VENOUS BLD VENIPUNCTURE: CPT

## 2023-09-26 NOTE — TELEPHONE ENCOUNTER
From: Nakul Hill MD   Sent: 9/25/2023   1:01 PM CDT   To: Dorian Johnson MD; Wally Britt MD; *   Subject: PNT                                              I met with Ms. Wagner today for follow up visit.  Following her pregnancy delivery, she developed diarrhea and found to have C. Diff, which was treated and resolved.  She was then admitted to [facility] for acute cholecystitis and treated with antibiotics for medical management, which is resolved.     Patient continues to have a PNT in place since hydronephrosis was noted during her pregnancy.  IR has interrogated the PNT twice, first right after her delivery and now about 5 weeks after that one.  They both show urinary stasis with no contrast exiting to the urinary bladder.  IR recommends PNT replacement every 3 months.     The patient is very frustrated about this, especially with her new baby, and would like to see if there are any other options to get rid of the PNT.  I'm including Dr. Johnson, her transplant surgeon, and Dr. Britt from Urology, although I believe he is on vacation for a couple of weeks or so.     Complicating all of this now, is that she has had several UTIs since having the PNT placed.  Last week she had a positive urine culture, but was asymptomatic, so no antibiotics were initially given.  However, now she reports some mild discomfort over her allograft, feeling clammy and nausea beginning yesterday.  No fever.  I started her on ciprofloxacin (bacteria was sensitive) for 10 days, as well as oral vancomycin to prevent C. Diff.     Jennifer, can you please check the following labs: Vitamin D, Iron panel, UPC, and DSA after this infection, as well as routine transplant labs.     Thoughts for other plans by anyone?     Nakul Orozco MD Bregman, Adam, MD; Wally Britt MD; Jennifer Trejo RN; Dorian Johnson MD  I agree with that plan and will see.  Jennifer, please have her cap her PNT  after done with antibiotics and then repeat ultrasound in a couple of days.  Of course, if she says she has no urine output from her bladder, she should uncap it.    Nakul Orozco MD Sveiven, Sara, RN  Okay, then she should cap the tube a couple of days prior to the ultrasound.          Previous Messages       ----- Message -----  From: Jennifer Trejo, RN  Sent: 9/29/2023   3:07 PM CDT  To: Nakul Hill MD  Subject: RE: PNT                                          Dr Hill,  I just wanted to update you that joão never did start her Cipro or vanco as her symptoms had resolved the day after you saw her in the clinic. She believes it was from receiving her influenza vaccine. Looks like this positive culture was originally not going to be treated as she was asymptomatic.    She states she continues to feel well but is eager, as you know, to get this tube out.  Getting her set up with US next week.    Jennifer Trejo RN  Transplant Coordinator  [phone]    OUTCOME:  US and Lab ordered and scheduled for Tuesday 10/3, patient aware.  Patient will cap PNT starting [place] 10/1 aware she is to uncap if she is unable to urinate from her bladder in 4 hours or becomes symptomatic.    Tacrolimus level 3.9 at 12 hours, on 9/26/2023.  Goal 4-6.   Current dose 3 mg in AM, 3 mg in PM    Dose changed to 3.5 mg in AM, 3 mg in PM   Recheck level in 1 week    Discussed with João Trejo RN   Transplant Coordinator  [phone]

## 2023-09-26 NOTE — TELEPHONE ENCOUNTER
Pt has upcoming appt    -  Lab on 09/26/2023   Component Date Value Ref Range Status     Sodium 09/26/2023 137  136 - 145 mmol/L Final     Potassium 09/26/2023 4.2  3.4 - 5.3 mmol/L Final     Chloride 09/26/2023 102  98 - 107 mmol/L Final     Carbon Dioxide (CO2) 09/26/2023 24  22 - 29 mmol/L Final     Anion Gap 09/26/2023 11  7 - 15 mmol/L Final     Urea Nitrogen 09/26/2023 17.7  6.0 - 20.0 mg/dL Final     Creatinine 09/26/2023 1.09 (H)  0.51 - 0.95 mg/dL Final     GFR Estimate 09/26/2023 70  >60 mL/min/1.73m2 Final     Calcium 09/26/2023 9.5  8.6 - 10.0 mg/dL Final     Glucose 09/26/2023 107 (H)  70 - 99 mg/dL Final     WBC Count 09/26/2023 6.3  4.0 - 11.0 10e3/uL Final     RBC Count 09/26/2023 4.07  3.80 - 5.20 10e6/uL Final     Hemoglobin 09/26/2023 11.9  11.7 - 15.7 g/dL Final     Hematocrit 09/26/2023 36.5  35.0 - 47.0 % Final     MCV 09/26/2023 90  78 - 100 fL Final     MCH 09/26/2023 29.2  26.5 - 33.0 pg Final     MCHC 09/26/2023 32.6  31.5 - 36.5 g/dL Final     RDW 09/26/2023 12.6  10.0 - 15.0 % Final     Platelet Count 09/26/2023 221  150 - 450 10e3/uL Final     Total Protein Urine mg/dL 09/26/2023 18.5 (H)    mg/dL Final    The reference ranges have not been established in urine protein. The results should be integrated into the clinical context for interpretation.     Total Protein Urine mg/mg Creat 09/26/2023 0.45 (H)  0.00 - 0.20 mg/mg Cr Final     Creatinine Urine mg/dL 09/26/2023 41.5  mg/dL Final    The reference ranges have not been established in urine creatinine. The results should be integrated into the clinical context for interpretation.     TSH 09/26/2023 1.56  0.30 - 4.20 uIU/mL Final   Office Visit on 09/21/2023   Component Date Value Ref Range Status     Color Urine 09/21/2023 Yellow  Colorless, Straw, Light Yellow, Yellow Final     Appearance Urine 09/21/2023 Clear  Clear Final     Glucose Urine 09/21/2023 Negative  Negative mg/dL Final     Bilirubin Urine 09/21/2023 Negative  Negative  Final     Ketones Urine 09/21/2023 Negative  Negative mg/dL Final     Specific Gravity Urine 09/21/2023 1.025  1.003 - 1.035 Final     Blood Urine 09/21/2023 Large (A)  Negative Final     pH Urine 09/21/2023 6.0  5.0 - 7.0 Final     Protein Albumin Urine 09/21/2023 >=300 (A)  Negative mg/dL Final     Urobilinogen Urine 09/21/2023 0.2  0.2, 1.0 E.U./dL Final     Nitrite Urine 09/21/2023 Positive (A)  Negative Final     Leukocyte Esterase Urine 09/21/2023 Moderate (A)  Negative Final     Culture 09/21/2023 >100,000 CFU/mL Klebsiella oxytoca (A)   Final     Culture 09/21/2023 50,000-100,000 CFU/mL Staphylococcus epidermidis (A)   Final   Lab on 09/20/2023   Component Date Value Ref Range Status     Sodium 09/20/2023 137  136 - 145 mmol/L Final     Potassium 09/20/2023 4.3  3.4 - 5.3 mmol/L Final     Chloride 09/20/2023 104  98 - 107 mmol/L Final     Carbon Dioxide (CO2) 09/20/2023 23  22 - 29 mmol/L Final     Anion Gap 09/20/2023 10  7 - 15 mmol/L Final     Urea Nitrogen 09/20/2023 23.8 (H)  6.0 - 20.0 mg/dL Final     Creatinine 09/20/2023 1.20 (H)  0.51 - 0.95 mg/dL Final     Calcium 09/20/2023 9.2  8.6 - 10.0 mg/dL Final     Glucose 09/20/2023 108 (H)  70 - 99 mg/dL Final     GFR Estimate 09/20/2023 62  >60 mL/min/1.73m2 Final     WBC Count 09/20/2023 5.7  4.0 - 11.0 10e3/uL Final     RBC Count 09/20/2023 3.89  3.80 - 5.20 10e6/uL Final     Hemoglobin 09/20/2023 11.5 (L)  11.7 - 15.7 g/dL Final     Hematocrit 09/20/2023 35.3  35.0 - 47.0 % Final     MCV 09/20/2023 91  78 - 100 fL Final     MCH 09/20/2023 29.6  26.5 - 33.0 pg Final     MCHC 09/20/2023 32.6  31.5 - 36.5 g/dL Final     RDW 09/20/2023 12.8  10.0 - 15.0 % Final     Platelet Count 09/20/2023 219  150 - 450 10e3/uL Final     Total Protein Urine mg/dL 09/20/2023 9.0    mg/dL Final    The reference ranges have not been established in urine protein. The results should be integrated into the clinical context for interpretation.     Total Protein Urine mg/mg  Creat 09/20/2023 0.31 (H)  0.00 - 0.20 mg/mg Cr Final     Creatinine Urine mg/dL 09/20/2023 29.0  mg/dL Final    The reference ranges have not been established in urine creatinine. The results should be integrated into the clinical context for interpretation.     Tacrolimus by [employer] Mass Spectrom* 09/20/2023 7.0  5.0 - 15.0 ug/L Final    Comment: Tacrolimus Reference Range (ug/L):    Kidney Transplant:  Pediatric  0-3 months post transplant: 10-12  3-6 months post transplant: 8-10  6-12 months post transplant: 6-8  >12 months post transplant: 4-7    Adult  0-6 months post transplant: 8-10  6-12 months post transplant: 6-8  >12 months post transplant: 4-6  >5 years post transplant: 3-5    Heart Transplant:  Pediatric  0-12 months post transplant: 10-15  >12 months post transplant: 5-10    Adult  0-3 months post transplant: 10-15  3-6 months post transplant: 8-12  6-12 months post transplant: 6-12  >12 months post transplant: 6-10    Lung Transplant:  0-12 months post transplant: 10-15  >12 months post transplant: 8-12    Liver Transplant:  Pediatric  0-3 months post transplant: 10-15  3-6 months post transplant: 8-10  6 months-5 years post transplant: 6-8   >5 years post transplant: 1-3    Adult  0-3 months post transplant: 10-12  3-6 months post transplant: 8-10  >6 months post transplant: 6-8    Pancreas Transplant:  0-6                            months post transplant: 8-10  >6 months post transplant: 5-8     Tacrolimus Last Dose Date 09/20/2023 9/19/2023   Final     Tacrolimus Last Dose Time 09/20/2023  9:30 PM   Final

## 2023-09-27 ENCOUNTER — PRE VISIT (OUTPATIENT)
Dept: SURGERY | Facility: CLINIC | Age: 33
End: 2023-09-27

## 2023-10-03 ENCOUNTER — TELEPHONE (OUTPATIENT)
Dept: TRANSPLANT | Facility: CLINIC | Age: 33
End: 2023-10-03

## 2023-10-03 ENCOUNTER — TELEPHONE (OUTPATIENT)
Dept: INTERVENTIONAL RADIOLOGY/VASCULAR | Facility: CLINIC | Age: 33
End: 2023-10-03

## 2023-10-03 ENCOUNTER — TELEPHONE (OUTPATIENT)
Dept: ENDOCRINOLOGY | Facility: CLINIC | Age: 33
End: 2023-10-03

## 2023-10-03 ENCOUNTER — LAB (OUTPATIENT)
Dept: LAB | Facility: CLINIC | Age: 33
End: 2023-10-03
Payer: MEDICARE

## 2023-10-03 ENCOUNTER — ANCILLARY PROCEDURE (OUTPATIENT)
Dept: ULTRASOUND IMAGING | Facility: CLINIC | Age: 33
End: 2023-10-03
Attending: INTERNAL MEDICINE
Payer: MEDICARE

## 2023-10-03 DIAGNOSIS — Z48.298 AFTERCARE FOLLOWING ORGAN TRANSPLANT: ICD-10-CM

## 2023-10-03 DIAGNOSIS — E03.9 ACQUIRED HYPOTHYROIDISM: ICD-10-CM

## 2023-10-03 LAB
ALBUMIN MFR UR ELPH: 11.7 MG/DL
ANION GAP SERPL CALCULATED.3IONS-SCNC: 13 MMOL/L (ref 7–15)
BUN SERPL-MCNC: 25.7 MG/DL (ref 6–20)
CALCIUM SERPL-MCNC: 10 MG/DL (ref 8.6–10)
CHLORIDE SERPL-SCNC: 102 MMOL/L (ref 98–107)
CREAT SERPL-MCNC: 1.16 MG/DL (ref 0.51–0.95)
CREAT UR-MCNC: 31.5 MG/DL
DEPRECATED HCO3 PLAS-SCNC: 23 MMOL/L (ref 22–29)
EGFRCR SERPLBLD CKD-EPI 2021: 65 ML/MIN/1.73M2
ERYTHROCYTE [DISTWIDTH] IN BLOOD BY AUTOMATED COUNT: 12.7 % (ref 10–15)
FERRITIN SERPL-MCNC: 808 NG/ML (ref 6–175)
GLUCOSE SERPL-MCNC: 108 MG/DL (ref 70–99)
HCT VFR BLD AUTO: 37.4 % (ref 35–47)
HGB BLD-MCNC: 12.1 G/DL (ref 11.7–15.7)
IRON BINDING CAPACITY (ROCHE): 225 UG/DL (ref 240–430)
IRON SATN MFR SERPL: 18 % (ref 15–46)
IRON SERPL-MCNC: 40 UG/DL (ref 37–145)
MCH RBC QN AUTO: 28.7 PG (ref 26.5–33)
MCHC RBC AUTO-ENTMCNC: 32.4 G/DL (ref 31.5–36.5)
MCV RBC AUTO: 89 FL (ref 78–100)
PATH REPORT.COMMENTS IMP SPEC: NORMAL
PATH REPORT.COMMENTS IMP SPEC: NORMAL
PATH REPORT.FINAL DX SPEC: NORMAL
PATH REPORT.GROSS SPEC: NORMAL
PATH REPORT.MICROSCOPIC SPEC OTHER STN: NORMAL
PATH REPORT.RELEVANT HX SPEC: NORMAL
PHOTO IMAGE: NORMAL
PLATELET # BLD AUTO: 236 10E3/UL (ref 150–450)
POTASSIUM SERPL-SCNC: 4.3 MMOL/L (ref 3.4–5.3)
PROT/CREAT 24H UR: 0.37 MG/MG CR (ref 0–0.2)
RBC # BLD AUTO: 4.22 10E6/UL (ref 3.8–5.2)
SODIUM SERPL-SCNC: 138 MMOL/L (ref 135–145)
T3FREE SERPL-MCNC: 2.7 PG/ML (ref 2–4.4)
T4 FREE SERPL-MCNC: 1.37 NG/DL (ref 0.9–1.7)
TACROLIMUS BLD-MCNC: 5.5 UG/L (ref 5–15)
TME LAST DOSE: NORMAL H
TME LAST DOSE: NORMAL H
TSH SERPL DL<=0.005 MIU/L-ACNC: 1.57 UIU/ML (ref 0.3–4.2)
VIT D+METAB SERPL-MCNC: 46 NG/ML (ref 20–50)
WBC # BLD AUTO: 8 10E3/UL (ref 4–11)

## 2023-10-03 PROCEDURE — 80048 BASIC METABOLIC PNL TOTAL CA: CPT | Performed by: PATHOLOGY

## 2023-10-03 PROCEDURE — 82306 VITAMIN D 25 HYDROXY: CPT | Performed by: INTERNAL MEDICINE

## 2023-10-03 PROCEDURE — 99000 SPECIMEN HANDLING OFFICE-LAB: CPT | Performed by: PATHOLOGY

## 2023-10-03 PROCEDURE — 84156 ASSAY OF PROTEIN URINE: CPT | Performed by: PATHOLOGY

## 2023-10-03 PROCEDURE — 83550 IRON BINDING TEST: CPT | Performed by: PATHOLOGY

## 2023-10-03 PROCEDURE — 84481 FREE ASSAY (FT-3): CPT | Performed by: INTERNAL MEDICINE

## 2023-10-03 PROCEDURE — 84443 ASSAY THYROID STIM HORMONE: CPT | Performed by: PATHOLOGY

## 2023-10-03 PROCEDURE — 83540 ASSAY OF IRON: CPT | Performed by: PATHOLOGY

## 2023-10-03 PROCEDURE — 80197 ASSAY OF TACROLIMUS: CPT | Performed by: INTERNAL MEDICINE

## 2023-10-03 PROCEDURE — 36415 COLL VENOUS BLD VENIPUNCTURE: CPT | Performed by: PATHOLOGY

## 2023-10-03 PROCEDURE — 82728 ASSAY OF FERRITIN: CPT | Performed by: PATHOLOGY

## 2023-10-03 PROCEDURE — 76776 US EXAM K TRANSPL W/DOPPLER: CPT | Performed by: RADIOLOGY

## 2023-10-03 PROCEDURE — 84439 ASSAY OF FREE THYROXINE: CPT | Performed by: PATHOLOGY

## 2023-10-03 PROCEDURE — 85027 COMPLETE CBC AUTOMATED: CPT | Performed by: PATHOLOGY

## 2023-10-03 PROCEDURE — 88307 TISSUE EXAM BY PATHOLOGIST: CPT | Mod: 26 | Performed by: PATHOLOGY

## 2023-10-03 NOTE — TELEPHONE ENCOUNTER
Followed up with Mona after renal US. She capped her PNT on 10/1 and has kept it capped she reports voiding 100 mL/hr and voiding every 2 hours. Her maximum output was 300 mL and is concerned that her bladder has shrunk. She does not feel any UTI symptoms with voiding. Renal US routed to transplant nephrology and urology to review. Patient is hopeful for PNT removal.

## 2023-10-03 NOTE — TELEPHONE ENCOUNTER
Pt has upcoming appt    -  Dear Mona    Here are your thyroid labs which are NORMAL!    If you have any questions, please feel free to contact my nurse at [phone] select option #3 for triage nurse  or  option #1 for scheduling related questions.    Regards    Katrin Lopez MD

## 2023-10-03 NOTE — TELEPHONE ENCOUNTER
Patient contacted IR looking for replacement PNT bard bag and connector tubing.     I have answered patient questions and contacted Nephrology coordinator Jennifer Trejo for insight into plan of care moving forward.    Nathalia REEVES  Interventional Radiology RN   [phone]

## 2023-10-04 ENCOUNTER — TELEPHONE (OUTPATIENT)
Dept: TRANSPLANT | Facility: CLINIC | Age: 33
End: 2023-10-04
Payer: MEDICARE

## 2023-10-04 DIAGNOSIS — R19.7 DIARRHEA, UNSPECIFIED TYPE: Primary | ICD-10-CM

## 2023-10-04 DIAGNOSIS — Z94.0 KIDNEY REPLACED BY TRANSPLANT: ICD-10-CM

## 2023-10-04 DIAGNOSIS — Z48.298 AFTERCARE FOLLOWING ORGAN TRANSPLANT: ICD-10-CM

## 2023-10-04 NOTE — Clinical Note
Dr. Britt reviewed her ultrasound and may pull PNT Friday. She's having worsening loose stools with recent history of C diff- can I order an enteric panel?

## 2023-10-04 NOTE — TELEPHONE ENCOUNTER
Notified Mona that urology would like her to repeat labs Friday morning, and possibly pull her PNT on Friday if her labs are stable. She is agreeable to this.    She reports worsening loose stools over the last 48 hours. No fever. C diff and enteric panel ordered.

## 2023-10-05 ENCOUNTER — TELEPHONE (OUTPATIENT)
Dept: UROLOGY | Facility: CLINIC | Age: 33
End: 2023-10-05
Payer: MEDICARE

## 2023-10-05 ENCOUNTER — TELEPHONE (OUTPATIENT)
Dept: TRANSPLANT | Facility: CLINIC | Age: 33
End: 2023-10-05
Payer: MEDICARE

## 2023-10-05 ENCOUNTER — TELEPHONE (OUTPATIENT)
Dept: TRANSPLANT | Facility: CLINIC | Age: 33
End: 2023-10-05

## 2023-10-05 ENCOUNTER — LAB (OUTPATIENT)
Dept: LAB | Facility: HOSPITAL | Age: 33
End: 2023-10-05
Payer: MEDICARE

## 2023-10-05 DIAGNOSIS — R19.7 DIARRHEA, UNSPECIFIED TYPE: ICD-10-CM

## 2023-10-05 DIAGNOSIS — Z94.0 KIDNEY REPLACED BY TRANSPLANT: Primary | ICD-10-CM

## 2023-10-05 DIAGNOSIS — R39.9 UTI SYMPTOMS: ICD-10-CM

## 2023-10-05 DIAGNOSIS — Z94.0 KIDNEY REPLACED BY TRANSPLANT: ICD-10-CM

## 2023-10-05 DIAGNOSIS — Z48.298 AFTERCARE FOLLOWING ORGAN TRANSPLANT: ICD-10-CM

## 2023-10-05 PROCEDURE — 87324 CLOSTRIDIUM AG IA: CPT

## 2023-10-05 PROCEDURE — 87507 IADNA-DNA/RNA PROBE TQ 12-25: CPT

## 2023-10-05 PROCEDURE — 87493 C DIFF AMPLIFIED PROBE: CPT | Mod: XU

## 2023-10-05 RX ORDER — CEFPODOXIME PROXETIL 200 MG/1
200 TABLET, FILM COATED ORAL 2 TIMES DAILY
Qty: 20 TABLET | Refills: 0 | Status: SHIPPED | OUTPATIENT
Start: 2023-10-05 | End: 2023-11-27

## 2023-10-05 NOTE — CONFIDENTIAL NOTE
Spoke with patient who was told that she could have her PNT placed.  This author has sent a text message to MD for confirmation of orders.  Patient is also having labs drawn tomorrow morning.  This author explained that the ability to remove the PNT would depend on the results of those labs, and MD final approval.  This author's direct line provided for future questions/concerns.    Jericho Miller, RN  RN Care Coordinator - Urology

## 2023-10-05 NOTE — TELEPHONE ENCOUNTER
Patient Call: General    Reason for call: patient called has questions regarding medication that caller is allergic to.    Call back needed? Yes    Return Call Needed  Same as documented in contacts section  When to return call?: Greater than one day: Route standard priority

## 2023-10-05 NOTE — TELEPHONE ENCOUNTER
Mona called to report worsening loose stools, in addition to feeling chills, diaphoretic, clammy, and dizziness. She reports her temp at home is 98.6, BP 99/62, HR 99. Her PNT has been capped and she is voiding without issue.     Message sent to Dr. Mcintyre, who advised that patient start the cipro and vanco prescribed for her last week. She is agreeable to complete labs and start vanco but is resistant to starting Cipro at 500 mg due to risk of tendon rupture. Reviewed with provider, prescribed cefpodoxime 200 mg BID x 10 days.

## 2023-10-06 ENCOUNTER — TELEPHONE (OUTPATIENT)
Dept: TRANSPLANT | Facility: CLINIC | Age: 33
End: 2023-10-06

## 2023-10-06 ENCOUNTER — LAB (OUTPATIENT)
Dept: LAB | Facility: HOSPITAL | Age: 33
End: 2023-10-06
Payer: MEDICARE

## 2023-10-06 ENCOUNTER — VIRTUAL VISIT (OUTPATIENT)
Dept: ENDOCRINOLOGY | Facility: CLINIC | Age: 33
End: 2023-10-06
Payer: MEDICARE

## 2023-10-06 ENCOUNTER — INFUSION THERAPY VISIT (OUTPATIENT)
Dept: INFUSION THERAPY | Facility: CLINIC | Age: 33
End: 2023-10-06
Attending: INTERNAL MEDICINE
Payer: MEDICARE

## 2023-10-06 VITALS
HEART RATE: 89 BPM | SYSTOLIC BLOOD PRESSURE: 98 MMHG | TEMPERATURE: 98.1 F | HEIGHT: 60 IN | OXYGEN SATURATION: 97 % | BODY MASS INDEX: 29.45 KG/M2 | WEIGHT: 150 LBS | DIASTOLIC BLOOD PRESSURE: 62 MMHG

## 2023-10-06 VITALS
TEMPERATURE: 98.1 F | SYSTOLIC BLOOD PRESSURE: 107 MMHG | RESPIRATION RATE: 16 BRPM | HEART RATE: 90 BPM | OXYGEN SATURATION: 97 % | DIASTOLIC BLOOD PRESSURE: 71 MMHG

## 2023-10-06 DIAGNOSIS — E03.9 ACQUIRED HYPOTHYROIDISM: ICD-10-CM

## 2023-10-06 DIAGNOSIS — R79.89 ELEVATED SERUM CREATININE: Primary | ICD-10-CM

## 2023-10-06 DIAGNOSIS — R73.01 IMPAIRED FASTING GLUCOSE: Primary | ICD-10-CM

## 2023-10-06 DIAGNOSIS — R39.9 UTI SYMPTOMS: Primary | ICD-10-CM

## 2023-10-06 DIAGNOSIS — D84.9 IMMUNOSUPPRESSED STATUS (H): ICD-10-CM

## 2023-10-06 DIAGNOSIS — Z48.298 AFTERCARE FOLLOWING ORGAN TRANSPLANT: ICD-10-CM

## 2023-10-06 DIAGNOSIS — Z22.1 CLOSTRIDIUM DIFFICILE CARRIER: ICD-10-CM

## 2023-10-06 DIAGNOSIS — Z94.0 KIDNEY REPLACED BY TRANSPLANT: ICD-10-CM

## 2023-10-06 DIAGNOSIS — N13.30 HYDRONEPHROSIS OF KIDNEY TRANSPLANT: Primary | ICD-10-CM

## 2023-10-06 DIAGNOSIS — R79.89 ELEVATED SERUM CREATININE: ICD-10-CM

## 2023-10-06 DIAGNOSIS — A09 DIARRHEA OF INFECTIOUS ORIGIN: ICD-10-CM

## 2023-10-06 DIAGNOSIS — R73.01 IMPAIRED FASTING GLUCOSE: ICD-10-CM

## 2023-10-06 DIAGNOSIS — T86.19 HYDRONEPHROSIS OF KIDNEY TRANSPLANT: Primary | ICD-10-CM

## 2023-10-06 DIAGNOSIS — R39.9 UTI SYMPTOMS: ICD-10-CM

## 2023-10-06 LAB
ALBUMIN MFR UR ELPH: 11.2 MG/DL
ALBUMIN UR-MCNC: NEGATIVE MG/DL
ANION GAP SERPL CALCULATED.3IONS-SCNC: 11 MMOL/L (ref 7–15)
APPEARANCE UR: CLEAR
BILIRUB UR QL STRIP: NEGATIVE
BUN SERPL-MCNC: 15.7 MG/DL (ref 6–20)
CALCIUM SERPL-MCNC: 9.2 MG/DL (ref 8.6–10)
CHLORIDE SERPL-SCNC: 100 MMOL/L (ref 98–107)
COLOR UR AUTO: COLORLESS
CREAT SERPL-MCNC: 1.41 MG/DL (ref 0.51–0.95)
CREAT UR-MCNC: 53.6 MG/DL
DEPRECATED HCO3 PLAS-SCNC: 23 MMOL/L (ref 22–29)
EGFRCR SERPLBLD CKD-EPI 2021: 51 ML/MIN/1.73M2
ERYTHROCYTE [DISTWIDTH] IN BLOOD BY AUTOMATED COUNT: 12.7 % (ref 10–15)
GLUCOSE SERPL-MCNC: 146 MG/DL (ref 70–99)
GLUCOSE UR STRIP-MCNC: NEGATIVE MG/DL
HBA1C MFR BLD: 5.7 %
HCT VFR BLD AUTO: 35.1 % (ref 35–47)
HGB BLD-MCNC: 11.1 G/DL (ref 11.7–15.7)
HGB UR QL STRIP: ABNORMAL
KETONES UR STRIP-MCNC: NEGATIVE MG/DL
LEUKOCYTE ESTERASE UR QL STRIP: ABNORMAL
MCH RBC QN AUTO: 28.4 PG (ref 26.5–33)
MCHC RBC AUTO-ENTMCNC: 31.6 G/DL (ref 31.5–36.5)
MCV RBC AUTO: 90 FL (ref 78–100)
NITRATE UR QL: NEGATIVE
PH UR STRIP: 5.5 [PH] (ref 5–7)
PLATELET # BLD AUTO: 214 10E3/UL (ref 150–450)
POTASSIUM SERPL-SCNC: 4.4 MMOL/L (ref 3.4–5.3)
PROLACTIN SERPL 3RD IS-MCNC: 25 NG/ML (ref 5–23)
PROT/CREAT 24H UR: 0.21 MG/MG CR (ref 0–0.2)
RBC # BLD AUTO: 3.91 10E6/UL (ref 3.8–5.2)
RBC URINE: <1 /HPF
SODIUM SERPL-SCNC: 134 MMOL/L (ref 135–145)
SP GR UR STRIP: 1 (ref 1–1.03)
SQUAMOUS EPITHELIAL: <1 /HPF
TACROLIMUS BLD-MCNC: 3.6 UG/L (ref 5–15)
TME LAST DOSE: ABNORMAL H
TME LAST DOSE: ABNORMAL H
UROBILINOGEN UR STRIP-MCNC: <2 MG/DL
WBC # BLD AUTO: 12.4 10E3/UL (ref 4–11)
WBC URINE: 16 /HPF

## 2023-10-06 PROCEDURE — 83036 HEMOGLOBIN GLYCOSYLATED A1C: CPT

## 2023-10-06 PROCEDURE — 84146 ASSAY OF PROLACTIN: CPT

## 2023-10-06 PROCEDURE — 87086 URINE CULTURE/COLONY COUNT: CPT

## 2023-10-06 PROCEDURE — 96360 HYDRATION IV INFUSION INIT: CPT

## 2023-10-06 PROCEDURE — 36415 COLL VENOUS BLD VENIPUNCTURE: CPT

## 2023-10-06 PROCEDURE — 258N000003 HC RX IP 258 OP 636: Performed by: INTERNAL MEDICINE

## 2023-10-06 PROCEDURE — 81001 URINALYSIS AUTO W/SCOPE: CPT

## 2023-10-06 PROCEDURE — 80048 BASIC METABOLIC PNL TOTAL CA: CPT

## 2023-10-06 PROCEDURE — 99442 PR PHYSICIAN TELEPHONE EVALUATION 11-20 MIN: CPT | Mod: VID | Performed by: INTERNAL MEDICINE

## 2023-10-06 PROCEDURE — 85027 COMPLETE CBC AUTOMATED: CPT

## 2023-10-06 PROCEDURE — 80197 ASSAY OF TACROLIMUS: CPT

## 2023-10-06 PROCEDURE — 999N000248 HC STATISTIC IV INSERT WITH US BY RN

## 2023-10-06 PROCEDURE — 84156 ASSAY OF PROTEIN URINE: CPT

## 2023-10-06 RX ORDER — HEPARIN SODIUM (PORCINE) LOCK FLUSH IV SOLN 100 UNIT/ML 100 UNIT/ML
5 SOLUTION INTRAVENOUS
Status: CANCELLED | OUTPATIENT
Start: 2023-10-06

## 2023-10-06 RX ORDER — ALBUTEROL SULFATE 90 UG/1
1-2 AEROSOL, METERED RESPIRATORY (INHALATION)
Status: CANCELLED
Start: 2023-10-06

## 2023-10-06 RX ORDER — ALBUTEROL SULFATE 0.83 MG/ML
2.5 SOLUTION RESPIRATORY (INHALATION)
Status: CANCELLED | OUTPATIENT
Start: 2023-10-06

## 2023-10-06 RX ORDER — METHYLPREDNISOLONE SODIUM SUCCINATE 125 MG/2ML
125 INJECTION, POWDER, LYOPHILIZED, FOR SOLUTION INTRAMUSCULAR; INTRAVENOUS
Status: CANCELLED
Start: 2023-10-06

## 2023-10-06 RX ORDER — DIPHENHYDRAMINE HYDROCHLORIDE 50 MG/ML
50 INJECTION INTRAMUSCULAR; INTRAVENOUS
Status: CANCELLED
Start: 2023-10-06

## 2023-10-06 RX ORDER — EPINEPHRINE 1 MG/ML
0.3 INJECTION, SOLUTION, CONCENTRATE INTRAVENOUS EVERY 5 MIN PRN
Status: CANCELLED | OUTPATIENT
Start: 2023-10-06

## 2023-10-06 RX ORDER — MEPERIDINE HYDROCHLORIDE 25 MG/ML
25 INJECTION INTRAMUSCULAR; INTRAVENOUS; SUBCUTANEOUS EVERY 30 MIN PRN
Status: CANCELLED | OUTPATIENT
Start: 2023-10-06

## 2023-10-06 RX ORDER — HEPARIN SODIUM,PORCINE 10 UNIT/ML
5-20 VIAL (ML) INTRAVENOUS DAILY PRN
Status: CANCELLED | OUTPATIENT
Start: 2023-10-06

## 2023-10-06 RX ORDER — VANCOMYCIN HYDROCHLORIDE 125 MG/1
125 CAPSULE ORAL 4 TIMES DAILY
Qty: 16 CAPSULE | Refills: 0 | Status: SHIPPED | OUTPATIENT
Start: 2023-10-06 | End: 2023-11-27

## 2023-10-06 RX ORDER — TACROLIMUS 0.5 MG/1
0.5 CAPSULE ORAL 2 TIMES DAILY
COMMUNITY
Start: 2023-09-17 | End: 2024-06-26

## 2023-10-06 RX ORDER — EPINEPHRINE 1 MG/ML
0.3 INJECTION, SOLUTION INTRAMUSCULAR; SUBCUTANEOUS EVERY 5 MIN PRN
Status: CANCELLED | OUTPATIENT
Start: 2023-10-06

## 2023-10-06 RX ORDER — LEVOTHYROXINE SODIUM 25 UG/1
25 TABLET ORAL DAILY
Qty: 90 TABLET | Refills: 4 | Status: SHIPPED | OUTPATIENT
Start: 2023-10-06 | End: 2024-06-18

## 2023-10-06 RX ADMIN — SODIUM CHLORIDE 1000 ML: 9 INJECTION, SOLUTION INTRAVENOUS at 14:56

## 2023-10-06 ASSESSMENT — PAIN SCALES - GENERAL
PAINLEVEL: MILD PAIN (2)
PAINLEVEL: NO PAIN (0)

## 2023-10-06 NOTE — TELEPHONE ENCOUNTER
Spoke with Mona, UA/UC completed at [facility]. She is concerned that her blood pressures are a low 100/66 and she is still feeling clammy, but the rest of her GI symptoms have improved. She has no fever, a few watery stools this morning. She is concerned about a UTI as she is urinating every hour and is having urgency, burning with urination. Message sent to Dr. Mcintyre to discuss IVF this afternoon.

## 2023-10-06 NOTE — TELEPHONE ENCOUNTER
Mona was asking for a UA/UC order.  Stated she is going to [facility] at 0930 today 10/06/2023.  And she wanted to discuss her care plan.  (She is having left tube removed today [facility])

## 2023-10-06 NOTE — PROGRESS NOTES
Infusion Nursing Note:  Mona Wagner presents today for   Chief Complaint   Patient presents with    Infusion     IV fluids        present during visit today: Not Applicable.    Note:   -Orders from Gelacio Mcintyre MD completed. Frequency: once.  -1L NS infused over 60min.    Intravenous Access:  Peripheral IV placed in Rt forearm by VAT.    Treatment Conditions:  Not Applicable.    Post Infusion Assessment:  Patient tolerated infusion without incident.  Blood return noted pre and post infusion.  Site patent and intact, free from redness, edema or discomfort.  No evidence of extravasations.  Access discontinued per protocol.     Discharge Plan:   Discharge instructions reviewed with: Patient.  Patient and/or family verbalized understanding of discharge instructions and all questions answered.  AVS to patient via [email or web address].      Patient will follow-up with provider and care coordinator; therapy complete.     Patient discharged in stable condition accompanied by: self.  Departure Mode: Ambulatory.      Cris Monk RN    /71   Pulse 90   Temp 98.1  F (36.7  C)   Resp 16   LMP 12/16/2022   SpO2 97%     Administrations This Visit       sodium chloride 0.9% BOLUS 1,000 mL       Admin Date  10/06/2023 Action  $New Bag Dose  1,000 mL Route  Intravenous Administered By  Cris Monk, ERMELINDA                    
Yes

## 2023-10-06 NOTE — TELEPHONE ENCOUNTER
Gelacio Mcintyre MD McLennan, Sarah, RN  Scr up just a bit. Unclear if this is due to UTI or obstruction. I ideally would want to hold off on PNT removal until early next week if she would be ok with that. I really would want to hold off on removal, get labs Monday while on abx for the weekend and if everything looks ok can remove    Discussed labs with patient. She will keep PNT (capped) over the weekend and recheck labs Monday. She will continue on this course of antibiotics over the weekend. IV fluids scheduled for tomorrow AM. Tac and UC pending.

## 2023-10-06 NOTE — PROGRESS NOTES
"Video-Visit Details    TELEPHONE DUE TO TECHNICAL ISSUES    Converted to a telephone visit due to technical issues.  Start time 330.  Stop time 350, chart review, documentation time, coordination of care, 10 minutes.  Total time spent day of encounter 30 minutes.    Katrin Lopez MD     10/6/23    HPI  #1 history of hypothyroidism  Report, she had a history of hypothyroidism that was diagnosed when she had \"no from her nipples\" with associated high prolactin and presumably, high TSH.  She was then placed on levothyroxine and her nipple discharge and prolactin subsequently improved. Prior to pregnancy, the patient had been on levothyroxine 25 mcg daily 4 days a week, and 37.5 mcg daily 3 days/week. June 2023 TSH 2.51. July 2023 TSH 1.32.     Interval history: At the time of delivery, the patient was on levothyroxine 50 mcg daily.  However, after delivery, the patient has reduced to 25 mcg daily.  Continues on levothyroxine 25 mcg daily currently.  October 2023 TSH 1.57.     #2 status post delivery in August 2023  Reviewing her chart, this has been planned for many months, with the multidisciplinary team involved.  She reports oral contraceptive use from roughly [id number].  Her oral contraceptive use was stopped in 2022 and she reports regular menses.  She is being followed by nephrology, and maternal-fetal medicine.      Interval history: The patient delivered her baby in August 2023.  She is not breast-feeding.  The patient was in the NICU and is now home.  Per patient report, the patient is gaining weight and roughly 9 pounds.     #3 history of prediabetes-February 2023 hemoglobin A1c at 5.2  The patient reports history of prediabetes.  Reviewing the patient's chart, August 2019 hemoglobin A1c was 4.8, February 2020 hemoglobin A1c 5.7, in April 2020 hemoglobin A1c was 5.5. February 2023 hemoglobin A1c of 5.2. July 2023 oral glucose tolerance test unremarkable, with 3-hour post glucose (100 g load) at " 117.    Interval history: Patient reports concerned about fasting glucose level roughly 108.  She is concerned about the possibility of prediabetes.    #4 history of renal transplant in August 2019, now with likely tertiary hyperparathyroidism, status post removal of 3.5 parathyroid glands in April 2023  The patient has a history of end-stage renal disease, due to suspected IgA nephropathy for which she was previously on dialysis and underwent recent on transplant in August 2019.    Interval history: Currently managed by nephrology.  Has been struggling with multiple infections.     #5 history of high prolactin  Per chart, there is a history of prolactin at 43 in April 2016, 38 in February 2017, 38 in March 2018, 51 in June 2019, 9 in November 2019, 14 and April 2020, 14 in June 2021, 20.9 November 2021, and 17 in July 2022.  2017 had MRI did not clearly show a macroadenoma, although microadenoma cannot be excluded.  No contrast was used.    Interval history: This has normalized with treatment of TFTs.    #6 history of hypercalcemia, now with likely tertiary hyperparathyroidism, status post surgery removal of 3.5 parathyroid glands in April 2023.   This is thought to due to renal related secondary hyperparathyroidism.  Had been on Sensipar, however this was stopped as she was trying to become pregnant.  Nephrology felt no need to intervene on serum calcium greater than 11.  January 2023 PTH is 79, serum calcium was 10.8. Potentially, the consideration was for Cinacalcet use with the hypercalcemia (calcium as high as 11.2).  However the patient is concerned about the effect on pregnancy.  As she is in the second trimester, she has met with ENT .The patient is now status post removal of 3.5 parathyroid glands in April 2023.     Interval history: Subsequent calcium levels since parathyroid surgery has remained normal.  No longer on calcium supplementation.    Past Medical History  Past Medical History:   Diagnosis Date      Anemia in chronic kidney disease      Bacteremia 07/05/2023     Difficult intubation     see 4/28/23 anesthesia notes     History of bacterial endocarditis      History of blood transfusion      History of DVT (deep vein thrombosis) 2014     History of seizure 2014     History of subarachnoid hemorrhage      Hydronephrosis      Hypothyroidism      Kidney transplanted 08/03/2019    DCD DDKT. Induction with thymo 6mg/kg.     Orthostatic hypotension      FATOUMATA (obstructive sleep apnea)      Pyelonephritis of transplanted kidney 01/23/2020     Secondary renal hyperparathyroidism (H)      Urinary tract infection        Allergies  Allergies   Allergen Reactions     Contrast Dye Rash     CT contrast allergy 12/14/19 rash over eyes. Need to have pre medication before a CT WITH CONTRAST      Diatrizoate Rash     CT contrast allergy 12/14/19 rash over eyes. Need to have pre medication before a CT WITH CONTRAST      Lisinopril Swelling     angioedema     Nitrous Oxide Other (See Comments)     Sense of doom     Chlorhexidine Rash     Rash at site     Sulfa Antibiotics Rash     Muscle stiffness of neck     Dapsone      Methemoglobinemia     Furosemide Other (See Comments)     Skin flushing     Metrogel [Metronidazole]      Hives diffusely on body after vaginal administration.     Azithromycin Dizziness and Rash     Cefuroxime Rash     Medications  Current Outpatient Medications   Medication Sig Dispense Refill     acetaminophen (TYLENOL) 325 MG tablet Take 2 tablets (650 mg) by mouth every 4 hours as needed for mild pain 100 tablet 3     calcium carbonate 1250 MG/5ML SUSP suspension Take 2.5 mLs (625 mg) by mouth daily (Patient not taking: Reported on 9/21/2023) 500 mL 1     cefpodoxime (VANTIN) 200 MG tablet Take 1 tablet (200 mg) by mouth 2 times daily 20 tablet 0     ciprofloxacin (CIPRO) 500 MG tablet Take 1 tablet (500 mg) by mouth 2 times daily 20 tablet 0     fluticasone (FLONASE) 50 MCG/ACT nasal spray Spray 1 spray  into both nostrils daily 18.2 mL 1     lactase (LACTAID) 3000 UNIT tablet Take 3,000 Units by mouth 3 times daily (with meals)       levothyroxine (SYNTHROID/LEVOTHROID) 25 MCG tablet Take 1 tablet (25 mcg) by mouth daily 30 tablet 1     magnesium oxide (MAG-OX) 400 MG tablet Take 1 tablet (400 mg) by mouth every morning 90 tablet 3     mycophenolic acid (GENERIC EQUIVALENT) 180 MG EC tablet Take 3 tablets (540 mg) by mouth 2 times daily 180 tablet 11     norethindrone (MICRONOR) 0.35 MG tablet Take 1 tablet (0.35 mg) by mouth daily 90 tablet 3     patiromer (VELTASSA) 8.4 g packet Take 8.4 g by mouth daily as needed (as directed by your transplant team, for potassium = or > 5.5) (Patient not taking: Reported on 9/21/2023) 5 packet 1     polyethylene glycol (MIRALAX) 17 GM/Dose powder Take 17 g (1 capful) by mouth daily as needed for constipation 507 g 3     simethicone (MYLICON) 125 MG chewable tablet Take 125 mg by mouth daily       sodium bicarbonate 650 MG tablet Take 2 tablets (1,300 mg) by mouth 3 times daily for 90 days (Patient taking differently: Take 1,950 mg by mouth 2 times daily) 540 tablet 3     tacrolimus (GENERIC EQUIVALENT) 1 MG capsule Take 3 capsules (3 mg) by mouth 2 times daily 180 capsule 11     vancomycin (VANCOCIN) 125 MG capsule Take 1 capsule (125 mg) by mouth 4 times daily 24 capsule 0     vitamin D3 (CHOLECALCIFEROL) 50 mcg (2000 units) tablet Take 1 tablet (50 mcg) by mouth daily for 90 days (Patient taking differently: Take 1 tablet by mouth every morning) 90 tablet 3     Family History  family history includes Cerebrovascular Disease in her sister; Coronary Artery Disease in her father; Diabetes in her sister; Heart Disease in her father; Hypertension in her sister; Kidney Disease in her mother and sister; Kidney failure in her mother; Neurologic Disorder in her father; Parkinsonism in her father; Sleep Apnea in her father.  Social History  Social History     Socioeconomic History      Marital status:      Spouse name: Jt Aleman (culturally )     Number of children: 0     Years of education: Not on file     Highest education level: Not on file   Occupational History     Occupation: Pharmacy Technician   Tobacco Use     Smoking status: Never     Smokeless tobacco: Never   Vaping Use     Vaping Use: Never used   Substance and Sexual Activity     Alcohol use: Never     Drug use: Never     Sexual activity: Yes     Partners: Male   Other Topics Concern     Parent/sibling w/ CABG, MI or angioplasty before 65F 55M? Not Asked   Social History Narrative    Date of Service:5/15/2013     Name: Mona VALDOVINOS (Month, Day, Year of birth): 1990     MRN: [id number]     New Patient: Yes    Preferred Language: English     Needed: No    County of Residence: [place]    Marital Status:     Household size: 8    Number of Dependents:0     Pregnant: 0    Average Monthly Income: $ 600    Citizenship Status: Citizen    Gave Pt [employer] and [facility] applications     Social Determinants of Health     Financial Resource Strain: Low Risk  (2023)    Financial Resource Strain      Within the past 12 months, have you or your family members you live with been unable to get utilities (heat, electricity) when it was really needed?: No   Food Insecurity: Low Risk  (2023)    Food Insecurity      Within the past 12 months, did you worry that your food would run out before you got money to buy more?: No      Within the past 12 months, did the food you bought just not last and you didn t have money to get more?: No   Transportation Needs: Low Risk  (2023)    Transportation Needs      Within the past 12 months, has lack of transportation kept you from medical appointments, getting your medicines, non-medical meetings or appointments, work, or from getting things that you need?: No   Physical Activity: Not on file   Stress: Not on file   Social Connections: Not on file   Interpersonal  Safety: Not on file   Housing Stability: Low Risk  (9/21/2023)    Housing Stability      Do you have housing? : Yes      Are you worried about losing your housing?: No       ROS  Per HPI    Physical Exam  Vital signs and physical exam not available.  However the patient reports feeling well. Psych: Alert and oriented times 3; coherent speech, normal  rate and volume, able to articulate logical thoughts, able to abstract reason, no tangential thoughts, no hallucinations or delusions    RESULTS     Lab on 10/06/2023   Component Date Value Ref Range Status     Total Protein Urine mg/dL 10/06/2023 11.2    mg/dL Final    The reference ranges have not been established in urine protein. The results should be integrated into the clinical context for interpretation.     Total Protein Urine mg/mg Creat 10/06/2023 0.21 (H)  0.00 - 0.20 mg/mg Cr Final     Creatinine Urine mg/dL 10/06/2023 53.6  mg/dL Final    The reference ranges have not been established in urine creatinine. The results should be integrated into the clinical context for interpretation.     Sodium 10/06/2023 134 (L)  135 - 145 mmol/L Final    Reference intervals for this test were updated on 09/26/2023 to more accurately reflect our healthy population. There may be differences in the flagging of prior results with similar values performed with this method. Interpretation of those prior results can be made in the context of the updated reference intervals.      Potassium 10/06/2023 4.4  3.4 - 5.3 mmol/L Final     Chloride 10/06/2023 100  98 - 107 mmol/L Final     Carbon Dioxide (CO2) 10/06/2023 23  22 - 29 mmol/L Final     Anion Gap 10/06/2023 11  7 - 15 mmol/L Final     Urea Nitrogen 10/06/2023 15.7  6.0 - 20.0 mg/dL Final     Creatinine 10/06/2023 1.41 (H)  0.51 - 0.95 mg/dL Final     GFR Estimate 10/06/2023 51 (L)  >60 mL/min/1.73m2 Final     Calcium 10/06/2023 9.2  8.6 - 10.0 mg/dL Final     Glucose 10/06/2023 146 (H)  70 - 99 mg/dL Final     WBC Count  10/06/2023 12.4 (H)  4.0 - 11.0 10e3/uL Final     RBC Count 10/06/2023 3.91  3.80 - 5.20 10e6/uL Final     Hemoglobin 10/06/2023 11.1 (L)  11.7 - 15.7 g/dL Final     Hematocrit 10/06/2023 35.1  35.0 - 47.0 % Final     MCV 10/06/2023 90  78 - 100 fL Final     MCH 10/06/2023 28.4  26.5 - 33.0 pg Final     MCHC 10/06/2023 31.6  31.5 - 36.5 g/dL Final     RDW 10/06/2023 12.7  10.0 - 15.0 % Final     Platelet Count 10/06/2023 214  150 - 450 10e3/uL Final     Color Urine 10/06/2023 Colorless  Colorless, Straw, Light Yellow, Yellow Final     Appearance Urine 10/06/2023 Clear  Clear Final     Glucose Urine 10/06/2023 Negative  Negative mg/dL Final     Bilirubin Urine 10/06/2023 Negative  Negative Final     Ketones Urine 10/06/2023 Negative  Negative mg/dL Final     Specific Gravity Urine 10/06/2023 1.005  1.001 - 1.030 Final     Blood Urine 10/06/2023 0.06 mg/dL (A)  Negative Final     pH Urine 10/06/2023 5.5  5.0 - 7.0 Final     Protein Albumin Urine 10/06/2023 Negative  Negative mg/dL Final     Urobilinogen Urine 10/06/2023 <2.0  <2.0 mg/dL Final     Nitrite Urine 10/06/2023 Negative  Negative Final     Leukocyte Esterase Urine 10/06/2023 500 Sandoval/uL (A)  Negative Final     RBC Urine 10/06/2023 <1  <=2 /HPF Final     WBC Urine 10/06/2023 16 (H)  <=5 /HPF Final     Squamous Epithelials Urine 10/06/2023 <1  <=1 /HPF Final   Lab on 10/05/2023   Component Date Value Ref Range Status     C Difficile Toxin B by PCR 10/05/2023 Positive (A)  Negative Final    Detection of C. difficile nucleic acid in stools confirms the presence of these organisms in diarrheal patients but may not indicate that C. difficile is the etiologic agent of the diarrhea. Results from the Xpert C. difficile assay should be interpreted in conjunction with other laboratory and clinical data available to the clinician.    Patients with a positive C. difficile PCR result will receive reflex GDH/Toxin Immunoassay testing. Please interpret the PCR test result in  conjunction with GDH/Toxin Immunoassay results and the clinical status of patient.     Campylobacter species 10/05/2023 Negative  Negative Final     Salmonella species 10/05/2023 Negative  Negative Final     Vibrio species 10/05/2023 Negative  Negative Final     Vibrio cholerae 10/05/2023 Negative  Negative Final     Yersinia enterocolitica 10/05/2023 Negative  Negative Final     Enteropathogenic E. coli (EPEC) 10/05/2023 Negative  Negative, NA Final     Shiga-like toxin-producing E. coli* 10/05/2023 Negative  Negative Final     Shigella/Enteroinvasive E. coli (E* 10/05/2023 Negative  Negative Final     Cryptosporidium species 10/05/2023 Negative  Negative Final     Giardia lamblia 10/05/2023 Negative  Negative Final     Norovirus Gl/Gll 10/05/2023 Negative  Negative Final     Rotavirus A 10/05/2023 Negative  Negative Final     Plesiomonas shigelloides 10/05/2023 Negative  Negative Final     Enteroaggregative E. coli (EAEC) 10/05/2023 Negative  Negative Final     Enterotoxigenic E. coli (ETEC) 10/05/2023 Negative  Negative Final     E. coli O157 10/05/2023 NA  Negative, NA Final     Cyclospora cayetanensis 10/05/2023 Negative  Negative Final     Entamoeba histolytica 10/05/2023 Negative  Negative Final     Adenovirus F40/41 10/05/2023 Negative  Negative Final     Astrovirus 10/05/2023 Negative  Negative Final     Sapovirus 10/05/2023 Negative  Negative Final     C. difficile GDH Antigen 10/05/2023 Positive (A)  Negative Final     C. difficile Toxin 10/05/2023 Positive (A)  Negative Final   Lab on 10/03/2023   Component Date Value Ref Range Status     Total Protein Urine mg/dL 10/03/2023 11.7    mg/dL Final     Total Protein Urine mg/mg Creat 10/03/2023 0.37 (H)  0.00 - 0.20 mg/mg Cr Final     Creatinine Urine mg/dL 10/03/2023 31.5  mg/dL Final     Tacrolimus by Tandem Mass Spectrom* 10/03/2023 5.5  5.0 - 15.0 ug/L Final    Comment: Tacrolimus Reference Range (ug/L):    Kidney Transplant:  Pediatric  0-3 months post  transplant: 10-12  3-6 months post transplant: 8-10  6-12 months post transplant: 6-8  >12 months post transplant: 4-7    Adult  0-6 months post transplant: 8-10  6-12 months post transplant: 6-8  >12 months post transplant: 4-6  >5 years post transplant: 3-5    Heart Transplant:  Pediatric  0-12 months post transplant: 10-15  >12 months post transplant: 5-10    Adult  0-3 months post transplant: 10-15  3-6 months post transplant: 8-12  6-12 months post transplant: 6-12  >12 months post transplant: 6-10    Lung Transplant:  0-12 months post transplant: 10-15  >12 months post transplant: 8-12    Liver Transplant:  Pediatric  0-3 months post transplant: 10-15  3-6 months post transplant: 8-10  6 months-5 years post transplant: 6-8   >5 years post transplant: 1-3    Adult  0-3 months post transplant: 10-12  3-6 months post transplant: 8-10  >6 months post transplant: 6-8    Pancreas Transplant:  0-6                            months post transplant: 8-10  >6 months post transplant: 5-8     Tacrolimus Last Dose Date 10/03/2023    Final    Last dose information not provided.     Tacrolimus Last Dose Time 10/03/2023    Final    Last dose information not provided.     Sodium 10/03/2023 138  135 - 145 mmol/L Final    Reference intervals for this test were updated on 09/26/2023 to more accurately reflect our healthy population. There may be differences in the flagging of prior results with similar values performed with this method. Interpretation of those prior results can be made in the context of the updated reference intervals.      Potassium 10/03/2023 4.3  3.4 - 5.3 mmol/L Final     Chloride 10/03/2023 102  98 - 107 mmol/L Final     Carbon Dioxide (CO2) 10/03/2023 23  22 - 29 mmol/L Final     Anion Gap 10/03/2023 13  7 - 15 mmol/L Final     Urea Nitrogen 10/03/2023 25.7 (H)  6.0 - 20.0 mg/dL Final     Creatinine 10/03/2023 1.16 (H)  0.51 - 0.95 mg/dL Final     GFR Estimate 10/03/2023 65  >60 mL/min/1.73m2 Final      Calcium 10/03/2023 10.0  8.6 - 10.0 mg/dL Final     Glucose 10/03/2023 108 (H)  70 - 99 mg/dL Final     WBC Count 10/03/2023 8.0  4.0 - 11.0 10e3/uL Final     RBC Count 10/03/2023 4.22  3.80 - 5.20 10e6/uL Final     Hemoglobin 10/03/2023 12.1  11.7 - 15.7 g/dL Final     Hematocrit 10/03/2023 37.4  35.0 - 47.0 % Final     MCV 10/03/2023 89  78 - 100 fL Final     MCH 10/03/2023 28.7  26.5 - 33.0 pg Final     MCHC 10/03/2023 32.4  31.5 - 36.5 g/dL Final     RDW 10/03/2023 12.7  10.0 - 15.0 % Final     Platelet Count 10/03/2023 236  150 - 450 10e3/uL Final     Ferritin 10/03/2023 808 (H)  6 - 175 ng/mL Final     Iron 10/03/2023 40  37 - 145 ug/dL Final     Iron Binding Capacity 10/03/2023 225 (L)  240 - 430 ug/dL Final     Iron Sat Index 10/03/2023 18  15 - 46 % Final     Vitamin D, Total (25-Hydroxy) 10/03/2023 46  20 - 50 ng/mL Final    optimum levels     Free T4 10/03/2023 1.37  0.90 - 1.70 ng/dL Final     T3 Free 10/03/2023 2.7  2.0 - 4.4 pg/mL Final     TSH 10/03/2023 1.57  0.30 - 4.20 uIU/mL Final       ASSESSMENT:     #1 hypothyroidism in the setting of pregnancy  August 2023 TFTs normal.  Patient on levothyroxine 25 mcg daily.  Refills given for 1 year.  This should be checked yearly, sooner if symptoms.    #2  Status post delivery of baby in August 2023  Congratulated patient.  Not currently breast-feeding.     #3 history of prediabetes  No evidence for gestational diabetes at this time.  Per patient request, will add hemoglobin A1c to recent blood draw.    #4 history of renal transplant in August 2019  Per nephrology.       #5 history of high prolactin  Per patient request, will add prolactin to recent blood draw.  As noted previously, patient not currently breast-feeding.    #6 history of hypercalcemia, now with likely tertiary hyperparathyroidism, status post surgery removal of 3.5 parathyroid glands in April 2023.    Hypercalcemia has resolved.  Congratulated patient.    Prolactin and hemoglobin A1c added  to recent blood draw.  If aforementioned values are normal, I would be happy to see again as needed by patient or her primary provider.

## 2023-10-06 NOTE — TELEPHONE ENCOUNTER
Pt thought the Vanco was for 14 days and Rx has for 10 days  Also question if should take probiotic with the Cipro and Vanc

## 2023-10-06 NOTE — LETTER
"10/6/2023       RE: Mona Wagner  [address]  [facility] [address]     Dear Colleague,    Thank you for referring your patient, Mona Wagner, to the [facility] [place] at [facility]. Please see a copy of my visit note below.    Video-Visit Details    TELEPHONE DUE TO TECHNICAL ISSUES    Converted to a telephone visit due to technical issues.  Start time 330.  Stop time 350, chart review, documentation time, coordination of care, 10 minutes.  Total time spent day of encounter 30 minutes.    Katrin Lopez MD     10/6/23    HPI  #1 history of hypothyroidism  Report, she had a history of hypothyroidism that was diagnosed when she had \"no from her nipples\" with associated high prolactin and presumably, high TSH.  She was then placed on levothyroxine and her nipple discharge and prolactin subsequently improved. Prior to pregnancy, the patient had been on levothyroxine 25 mcg daily 4 days a week, and 37.5 mcg daily 3 days/week. June 2023 TSH 2.51. July 2023 TSH 1.32.     Interval history: At the time of delivery, the patient was on levothyroxine 50 mcg daily.  However, after delivery, the patient has reduced to 25 mcg daily.  Continues on levothyroxine 25 mcg daily currently.  October 2023 TSH 1.57.     #2 status post delivery in August 2023  Reviewing her chart, this has been planned for many months, with the multidisciplinary team involved.  She reports oral contraceptive use from roughly [id number].  Her oral contraceptive use was stopped in 2022 and she reports regular menses.  She is being followed by nephrology, and maternal-fetal medicine.      Interval history: The patient delivered her baby in August 2023.  She is not breast-feeding.  The patient was in the NICU and is now home.  Per patient report, the patient is gaining weight and roughly 9 pounds.     #3 history of prediabetes-February 2023 hemoglobin A1c at 5.2  The patient " Patient's mom returned the nurse's call and can be reached at CELL: [phone].     reports history of prediabetes.  Reviewing the patient's chart, August 2019 hemoglobin A1c was 4.8, February 2020 hemoglobin A1c 5.7, in April 2020 hemoglobin A1c was 5.5. February 2023 hemoglobin A1c of 5.2. July 2023 oral glucose tolerance test unremarkable, with 3-hour post glucose (100 g load) at 117.    Interval history: Patient reports concerned about fasting glucose level roughly 108.  She is concerned about the possibility of prediabetes.    #4 history of renal transplant in August 2019, now with likely tertiary hyperparathyroidism, status post removal of 3.5 parathyroid glands in April 2023  The patient has a history of end-stage renal disease, due to suspected IgA nephropathy for which she was previously on dialysis and underwent recent on transplant in August 2019.    Interval history: Currently managed by nephrology.  Has been struggling with multiple infections.     #5 history of high prolactin  Per chart, there is a history of prolactin at 43 in April 2016, 38 in February 2017, 38 in March 2018, 51 in June 2019, 9 in November 2019, 14 and April 2020, 14 in June 2021, 20.9 November 2021, and 17 in July 2022.  2017 had MRI did not clearly show a macroadenoma, although microadenoma cannot be excluded.  No contrast was used.    Interval history: This has normalized with treatment of TFTs.    #6 history of hypercalcemia, now with likely tertiary hyperparathyroidism, status post surgery removal of 3.5 parathyroid glands in April 2023.   This is thought to due to renal related secondary hyperparathyroidism.  Had been on Sensipar, however this was stopped as she was trying to become pregnant.  Nephrology felt no need to intervene on serum calcium greater than 11.  January 2023 PTH is 79, serum calcium was 10.8. Potentially, the consideration was for Cinacalcet use with the hypercalcemia (calcium as high as 11.2).  However the patient is concerned about the effect on pregnancy.  As she is in the second  trimester, she has met with ENT .The patient is now status post removal of 3.5 parathyroid glands in April 2023.     Interval history: Subsequent calcium levels since parathyroid surgery has remained normal.  No longer on calcium supplementation.    Past Medical History  Past Medical History:   Diagnosis Date    Anemia in chronic kidney disease     Bacteremia 07/05/2023    Difficult intubation     see 4/28/23 anesthesia notes    History of bacterial endocarditis     History of blood transfusion     History of DVT (deep vein thrombosis) 2014    History of seizure 2014    History of subarachnoid hemorrhage     Hydronephrosis     Hypothyroidism     Kidney transplanted 08/03/2019    DCD DDKT. Induction with thymo 6mg/kg.    Orthostatic hypotension     FATOUMATA (obstructive sleep apnea)     Pyelonephritis of transplanted kidney 01/23/2020    Secondary renal hyperparathyroidism (H)     Urinary tract infection        Allergies  Allergies   Allergen Reactions    Contrast Dye Rash     CT contrast allergy 12/14/19 rash over eyes. Need to have pre medication before a CT WITH CONTRAST     Diatrizoate Rash     CT contrast allergy 12/14/19 rash over eyes. Need to have pre medication before a CT WITH CONTRAST     Lisinopril Swelling     angioedema    Nitrous Oxide Other (See Comments)     Sense of doom    Chlorhexidine Rash     Rash at site    Sulfa Antibiotics Rash     Muscle stiffness of neck    Dapsone      Methemoglobinemia    Furosemide Other (See Comments)     Skin flushing    Metrogel [Metronidazole]      Hives diffusely on body after vaginal administration.    Azithromycin Dizziness and Rash    Cefuroxime Rash     Medications  Current Outpatient Medications   Medication Sig Dispense Refill    acetaminophen (TYLENOL) 325 MG tablet Take 2 tablets (650 mg) by mouth every 4 hours as needed for mild pain 100 tablet 3    calcium carbonate 1250 MG/5ML SUSP suspension Take 2.5 mLs (625 mg) by mouth daily (Patient not taking: Reported  on 9/21/2023) 500 mL 1    cefpodoxime (VANTIN) 200 MG tablet Take 1 tablet (200 mg) by mouth 2 times daily 20 tablet 0    ciprofloxacin (CIPRO) 500 MG tablet Take 1 tablet (500 mg) by mouth 2 times daily 20 tablet 0    fluticasone (FLONASE) 50 MCG/ACT nasal spray Spray 1 spray into both nostrils daily 18.2 mL 1    lactase (LACTAID) 3000 UNIT tablet Take 3,000 Units by mouth 3 times daily (with meals)      levothyroxine (SYNTHROID/LEVOTHROID) 25 MCG tablet Take 1 tablet (25 mcg) by mouth daily 30 tablet 1    magnesium oxide (MAG-OX) 400 MG tablet Take 1 tablet (400 mg) by mouth every morning 90 tablet 3    mycophenolic acid (GENERIC EQUIVALENT) 180 MG EC tablet Take 3 tablets (540 mg) by mouth 2 times daily 180 tablet 11    norethindrone (MICRONOR) 0.35 MG tablet Take 1 tablet (0.35 mg) by mouth daily 90 tablet 3    patiromer (VELTASSA) 8.4 g packet Take 8.4 g by mouth daily as needed (as directed by your transplant team, for potassium = or > 5.5) (Patient not taking: Reported on 9/21/2023) 5 packet 1    polyethylene glycol (MIRALAX) 17 GM/Dose powder Take 17 g (1 capful) by mouth daily as needed for constipation 507 g 3    simethicone (MYLICON) 125 MG chewable tablet Take 125 mg by mouth daily      sodium bicarbonate 650 MG tablet Take 2 tablets (1,300 mg) by mouth 3 times daily for 90 days (Patient taking differently: Take 1,950 mg by mouth 2 times daily) 540 tablet 3    tacrolimus (GENERIC EQUIVALENT) 1 MG capsule Take 3 capsules (3 mg) by mouth 2 times daily 180 capsule 11    vancomycin (VANCOCIN) 125 MG capsule Take 1 capsule (125 mg) by mouth 4 times daily 24 capsule 0    vitamin D3 (CHOLECALCIFEROL) 50 mcg (2000 units) tablet Take 1 tablet (50 mcg) by mouth daily for 90 days (Patient taking differently: Take 1 tablet by mouth every morning) 90 tablet 3     Family History  family history includes Cerebrovascular Disease in her sister; Coronary Artery Disease in her father; Diabetes in her sister; Heart Disease  in her father; Hypertension in her sister; Kidney Disease in her mother and sister; Kidney failure in her mother; Neurologic Disorder in her father; Parkinsonism in her father; Sleep Apnea in her father.  Social History  Social History     Socioeconomic History    Marital status:      Spouse name: Jt Aleman (culturally )    Number of children: 0    Years of education: Not on file    Highest education level: Not on file   Occupational History    Occupation: Pharmacy Technician   Tobacco Use    Smoking status: Never    Smokeless tobacco: Never   Vaping Use    Vaping Use: Never used   Substance and Sexual Activity    Alcohol use: Never    Drug use: Never    Sexual activity: Yes     Partners: Male   Other Topics Concern    Parent/sibling w/ CABG, MI or angioplasty before 65F 55M? Not Asked   Social History Narrative    Date of Service:5/15/2013     Name: Mona VALDOVINOS (Month, Day, Year of birth): 1990     MRN: [id number]     New Patient: Yes    Preferred Language: English     Needed: No    County of Residence: [place]    Marital Status:     Household size: 8    Number of Dependents:0     Pregnant: 0    Average Monthly Income: $ 600    Citizenship Status: Citizen    Gave Pt [employer] and [employer] applications     Social Determinants of Health     Financial Resource Strain: Low Risk  (2023)    Financial Resource Strain     Within the past 12 months, have you or your family members you live with been unable to get utilities (heat, electricity) when it was really needed?: No   Food Insecurity: Low Risk  (2023)    Food Insecurity     Within the past 12 months, did you worry that your food would run out before you got money to buy more?: No     Within the past 12 months, did the food you bought just not last and you didn t have money to get more?: No   Transportation Needs: Low Risk  (2023)    Transportation Needs     Within the past 12 months, has lack of transportation  kept you from medical appointments, getting your medicines, non-medical meetings or appointments, work, or from getting things that you need?: No   Physical Activity: Not on file   Stress: Not on file   Social Connections: Not on file   Interpersonal Safety: Not on file   Housing Stability: Low Risk  (9/21/2023)    Housing Stability     Do you have housing? : Yes     Are you worried about losing your housing?: No       ROS  Per HPI    Physical Exam  Vital signs and physical exam not available.  However the patient reports feeling well. Psych: Alert and oriented times 3; coherent speech, normal  rate and volume, able to articulate logical thoughts, able to abstract reason, no tangential thoughts, no hallucinations or delusions    RESULTS     Lab on 10/06/2023   Component Date Value Ref Range Status    Total Protein Urine mg/dL 10/06/2023 11.2    mg/dL Final    The reference ranges have not been established in urine protein. The results should be integrated into the clinical context for interpretation.    Total Protein Urine mg/mg Creat 10/06/2023 0.21 (H)  0.00 - 0.20 mg/mg Cr Final    Creatinine Urine mg/dL 10/06/2023 53.6  mg/dL Final    The reference ranges have not been established in urine creatinine. The results should be integrated into the clinical context for interpretation.    Sodium 10/06/2023 134 (L)  135 - 145 mmol/L Final    Reference intervals for this test were updated on 09/26/2023 to more accurately reflect our healthy population. There may be differences in the flagging of prior results with similar values performed with this method. Interpretation of those prior results can be made in the context of the updated reference intervals.     Potassium 10/06/2023 4.4  3.4 - 5.3 mmol/L Final    Chloride 10/06/2023 100  98 - 107 mmol/L Final    Carbon Dioxide (CO2) 10/06/2023 23  22 - 29 mmol/L Final    Anion Gap 10/06/2023 11  7 - 15 mmol/L Final    Urea Nitrogen 10/06/2023 15.7  6.0 - 20.0 mg/dL Final     Creatinine 10/06/2023 1.41 (H)  0.51 - 0.95 mg/dL Final    GFR Estimate 10/06/2023 51 (L)  >60 mL/min/1.73m2 Final    Calcium 10/06/2023 9.2  8.6 - 10.0 mg/dL Final    Glucose 10/06/2023 146 (H)  70 - 99 mg/dL Final    WBC Count 10/06/2023 12.4 (H)  4.0 - 11.0 10e3/uL Final    RBC Count 10/06/2023 3.91  3.80 - 5.20 10e6/uL Final    Hemoglobin 10/06/2023 11.1 (L)  11.7 - 15.7 g/dL Final    Hematocrit 10/06/2023 35.1  35.0 - 47.0 % Final    MCV 10/06/2023 90  78 - 100 fL Final    MCH 10/06/2023 28.4  26.5 - 33.0 pg Final    MCHC 10/06/2023 31.6  31.5 - 36.5 g/dL Final    RDW 10/06/2023 12.7  10.0 - 15.0 % Final    Platelet Count 10/06/2023 214  150 - 450 10e3/uL Final    Color Urine 10/06/2023 Colorless  Colorless, Straw, Light Yellow, Yellow Final    Appearance Urine 10/06/2023 Clear  Clear Final    Glucose Urine 10/06/2023 Negative  Negative mg/dL Final    Bilirubin Urine 10/06/2023 Negative  Negative Final    Ketones Urine 10/06/2023 Negative  Negative mg/dL Final    Specific Gravity Urine 10/06/2023 1.005  1.001 - 1.030 Final    Blood Urine 10/06/2023 0.06 mg/dL (A)  Negative Final    pH Urine 10/06/2023 5.5  5.0 - 7.0 Final    Protein Albumin Urine 10/06/2023 Negative  Negative mg/dL Final    Urobilinogen Urine 10/06/2023 <2.0  <2.0 mg/dL Final    Nitrite Urine 10/06/2023 Negative  Negative Final    Leukocyte Esterase Urine 10/06/2023 500 Sandoval/uL (A)  Negative Final    RBC Urine 10/06/2023 <1  <=2 /HPF Final    WBC Urine 10/06/2023 16 (H)  <=5 /HPF Final    Squamous Epithelials Urine 10/06/2023 <1  <=1 /HPF Final   Lab on 10/05/2023   Component Date Value Ref Range Status    C Difficile Toxin B by PCR 10/05/2023 Positive (A)  Negative Final    Detection of C. difficile nucleic acid in stools confirms the presence of these organisms in diarrheal patients but may not indicate that C. difficile is the etiologic agent of the diarrhea. Results from the Xpert C. difficile assay should be interpreted in conjunction with  other laboratory and clinical data available to the clinician.    Patients with a positive C. difficile PCR result will receive reflex GDH/Toxin Immunoassay testing. Please interpret the PCR test result in conjunction with GDH/Toxin Immunoassay results and the clinical status of patient.    Campylobacter species 10/05/2023 Negative  Negative Final    Salmonella species 10/05/2023 Negative  Negative Final    Vibrio species 10/05/2023 Negative  Negative Final    Vibrio cholerae 10/05/2023 Negative  Negative Final    Yersinia enterocolitica 10/05/2023 Negative  Negative Final    Enteropathogenic E. coli (EPEC) 10/05/2023 Negative  Negative, NA Final    Shiga-like toxin-producing E. coli* 10/05/2023 Negative  Negative Final    Shigella/Enteroinvasive E. coli (E* 10/05/2023 Negative  Negative Final    Cryptosporidium species 10/05/2023 Negative  Negative Final    Giardia lamblia 10/05/2023 Negative  Negative Final    Norovirus Gl/Gll 10/05/2023 Negative  Negative Final    Rotavirus A 10/05/2023 Negative  Negative Final    Plesiomonas shigelloides 10/05/2023 Negative  Negative Final    Enteroaggregative E. coli (EAEC) 10/05/2023 Negative  Negative Final    Enterotoxigenic E. coli (ETEC) 10/05/2023 Negative  Negative Final    E. coli O157 10/05/2023 NA  Negative, NA Final    Cyclospora cayetanensis 10/05/2023 Negative  Negative Final    Entamoeba histolytica 10/05/2023 Negative  Negative Final    Adenovirus F40/41 10/05/2023 Negative  Negative Final    Astrovirus 10/05/2023 Negative  Negative Final    Sapovirus 10/05/2023 Negative  Negative Final    C. difficile GDH Antigen 10/05/2023 Positive (A)  Negative Final    C. difficile Toxin 10/05/2023 Positive (A)  Negative Final   Lab on 10/03/2023   Component Date Value Ref Range Status    Total Protein Urine mg/dL 10/03/2023 11.7    mg/dL Final    Total Protein Urine mg/mg Creat 10/03/2023 0.37 (H)  0.00 - 0.20 mg/mg Cr Final    Creatinine Urine mg/dL 10/03/2023 31.5  mg/dL  Final    Tacrolimus by Tandem Mass Spectrom* 10/03/2023 5.5  5.0 - 15.0 ug/L Final    Comment: Tacrolimus Reference Range (ug/L):    Kidney Transplant:  Pediatric  0-3 months post transplant: 10-12  3-6 months post transplant: 8-10  6-12 months post transplant: 6-8  >12 months post transplant: 4-7    Adult  0-6 months post transplant: 8-10  6-12 months post transplant: 6-8  >12 months post transplant: 4-6  >5 years post transplant: 3-5    Heart Transplant:  Pediatric  0-12 months post transplant: 10-15  >12 months post transplant: 5-10    Adult  0-3 months post transplant: 10-15  3-6 months post transplant: 8-12  6-12 months post transplant: 6-12  >12 months post transplant: 6-10    Lung Transplant:  0-12 months post transplant: 10-15  >12 months post transplant: 8-12    Liver Transplant:  Pediatric  0-3 months post transplant: 10-15  3-6 months post transplant: 8-10  6 months-5 years post transplant: 6-8   >5 years post transplant: 1-3    Adult  0-3 months post transplant: 10-12  3-6 months post transplant: 8-10  >6 months post transplant: 6-8    Pancreas Transplant:  0-6                            months post transplant: 8-10  >6 months post transplant: 5-8    Tacrolimus Last Dose Date 10/03/2023    Final    Last dose information not provided.    Tacrolimus Last Dose Time 10/03/2023    Final    Last dose information not provided.    Sodium 10/03/2023 138  135 - 145 mmol/L Final    Reference intervals for this test were updated on 09/26/2023 to more accurately reflect our healthy population. There may be differences in the flagging of prior results with similar values performed with this method. Interpretation of those prior results can be made in the context of the updated reference intervals.     Potassium 10/03/2023 4.3  3.4 - 5.3 mmol/L Final    Chloride 10/03/2023 102  98 - 107 mmol/L Final    Carbon Dioxide (CO2) 10/03/2023 23  22 - 29 mmol/L Final    Anion Gap 10/03/2023 13  7 - 15 mmol/L Final    Urea  Nitrogen 10/03/2023 25.7 (H)  6.0 - 20.0 mg/dL Final    Creatinine 10/03/2023 1.16 (H)  0.51 - 0.95 mg/dL Final    GFR Estimate 10/03/2023 65  >60 mL/min/1.73m2 Final    Calcium 10/03/2023 10.0  8.6 - 10.0 mg/dL Final    Glucose 10/03/2023 108 (H)  70 - 99 mg/dL Final    WBC Count 10/03/2023 8.0  4.0 - 11.0 10e3/uL Final    RBC Count 10/03/2023 4.22  3.80 - 5.20 10e6/uL Final    Hemoglobin 10/03/2023 12.1  11.7 - 15.7 g/dL Final    Hematocrit 10/03/2023 37.4  35.0 - 47.0 % Final    MCV 10/03/2023 89  78 - 100 fL Final    MCH 10/03/2023 28.7  26.5 - 33.0 pg Final    MCHC 10/03/2023 32.4  31.5 - 36.5 g/dL Final    RDW 10/03/2023 12.7  10.0 - 15.0 % Final    Platelet Count 10/03/2023 236  150 - 450 10e3/uL Final    Ferritin 10/03/2023 808 (H)  6 - 175 ng/mL Final    Iron 10/03/2023 40  37 - 145 ug/dL Final    Iron Binding Capacity 10/03/2023 225 (L)  240 - 430 ug/dL Final    Iron Sat Index 10/03/2023 18  15 - 46 % Final    Vitamin D, Total (25-Hydroxy) 10/03/2023 46  20 - 50 ng/mL Final    optimum levels    Free T4 10/03/2023 1.37  0.90 - 1.70 ng/dL Final    T3 Free 10/03/2023 2.7  2.0 - 4.4 pg/mL Final    TSH 10/03/2023 1.57  0.30 - 4.20 uIU/mL Final       ASSESSMENT:     #1 hypothyroidism in the setting of pregnancy  August 2023 TFTs normal.  Patient on levothyroxine 25 mcg daily.  Refills given for 1 year.  This should be checked yearly, sooner if symptoms.    #2  Status post delivery of baby in August 2023  Congratulated patient.  Not currently breast-feeding.     #3 history of prediabetes  No evidence for gestational diabetes at this time.  Per patient request, will add hemoglobin A1c to recent blood draw.    #4 history of renal transplant in August 2019  Per nephrology.       #5 history of high prolactin  Per patient request, will add prolactin to recent blood draw.  As noted previously, patient not currently breast-feeding.    #6 history of hypercalcemia, now with likely tertiary hyperparathyroidism, status post  surgery removal of 3.5 parathyroid glands in April 2023.    Hypercalcemia has resolved.  Congratulated patient.    Prolactin and hemoglobin A1c added to recent blood draw.  If aforementioned values are normal, I would be happy to see again as needed by patient or her primary provider.        Again, thank you for allowing me to participate in the care of your patient.      Sincerely,    Katrin Lopez MD

## 2023-10-06 NOTE — PATIENT INSTRUCTIONS
Let me know if glucoe in AM > 126    1) exercise at least 1/2-hour a day up to 1 hour/day.  Please do something you find fun and enjoyable  2) do not eat after 7  3) limit your liquid intake to water, skim milk, or tea from a bag  4) eat less carbs  5) eat more raw fruits and vegetables

## 2023-10-06 NOTE — NURSING NOTE
Is the patient currently in the state of [place]? YES    Visit mode:VIDEO    If the visit is dropped, the patient can be reconnected by: VIDEO VISIT: Text to cell phone:   Telephone Information:   Mobile [phone]       Will anyone else be joining the visit? NO  (If patient encounters technical issues they should call [phone]    How would you like to obtain your AVS? MyChart    Are changes needed to the allergy or medication list? No    Pt states 2/10 mild headache, currently in infusion center.    Reason for visit: RECHECK    Declan CUNHA

## 2023-10-06 NOTE — PATIENT INSTRUCTIONS
Dear Mona Wagner    Thank you for choosing [facility] ([facility]) for your infusion.      We look forward to seeing you at your next appointment here at [facility]).  Please don t hesitate to call us at [phone] to reschedule any of your appointments or to speak with one of the [facility] registered nurses.  It was a pleasure taking care of you today.    Sincerely,    [facility]  [facility]  [address] [place] [address]  [id number]  Phone:  [phone]

## 2023-10-06 NOTE — PROGRESS NOTES
Spoke with patient and per MD orders, patient cannot have pnt pulled at this time.  Patient will repeat BMP on Monday.  This author's direct line provided for future questions/concerns.    Jericho Miller, RN  RN Care Coordinator - Urology

## 2023-10-08 LAB — BACTERIA UR CULT: NO GROWTH

## 2023-10-09 ENCOUNTER — TELEPHONE (OUTPATIENT)
Dept: TRANSPLANT | Facility: CLINIC | Age: 33
End: 2023-10-09

## 2023-10-09 ENCOUNTER — LAB (OUTPATIENT)
Dept: LAB | Facility: HOSPITAL | Age: 33
End: 2023-10-09
Payer: MEDICARE

## 2023-10-09 DIAGNOSIS — Z48.298 AFTERCARE FOLLOWING ORGAN TRANSPLANT: ICD-10-CM

## 2023-10-09 DIAGNOSIS — R19.7 DIARRHEA, UNSPECIFIED TYPE: ICD-10-CM

## 2023-10-09 DIAGNOSIS — N18.31 ANEMIA OF CHRONIC RENAL FAILURE, STAGE 3A (H): ICD-10-CM

## 2023-10-09 DIAGNOSIS — D63.1 ANEMIA OF CHRONIC RENAL FAILURE, STAGE 3A (H): ICD-10-CM

## 2023-10-09 DIAGNOSIS — N18.31 STAGE 3A CHRONIC KIDNEY DISEASE (H): ICD-10-CM

## 2023-10-09 DIAGNOSIS — Z94.0 KIDNEY REPLACED BY TRANSPLANT: ICD-10-CM

## 2023-10-09 DIAGNOSIS — Z22.1 CLOSTRIDIUM DIFFICILE CARRIER: ICD-10-CM

## 2023-10-09 DIAGNOSIS — D84.9 IMMUNOSUPPRESSED STATUS (H): Primary | ICD-10-CM

## 2023-10-09 LAB
ALBUMIN MFR UR ELPH: 5.1 MG/DL
ANION GAP SERPL CALCULATED.3IONS-SCNC: 12 MMOL/L (ref 7–15)
BUN SERPL-MCNC: 21.2 MG/DL (ref 6–20)
CALCIUM SERPL-MCNC: 9.1 MG/DL (ref 8.6–10)
CHLORIDE SERPL-SCNC: 103 MMOL/L (ref 98–107)
CREAT SERPL-MCNC: 1.35 MG/DL (ref 0.51–0.95)
CREAT UR-MCNC: 25.1 MG/DL
DEPRECATED HCO3 PLAS-SCNC: 23 MMOL/L (ref 22–29)
EGFRCR SERPLBLD CKD-EPI 2021: 54 ML/MIN/1.73M2
ERYTHROCYTE [DISTWIDTH] IN BLOOD BY AUTOMATED COUNT: 12.9 % (ref 10–15)
GLUCOSE SERPL-MCNC: 103 MG/DL (ref 70–99)
HCT VFR BLD AUTO: 34.3 % (ref 35–47)
HGB BLD-MCNC: 10.8 G/DL (ref 11.7–15.7)
MCH RBC QN AUTO: 28.6 PG (ref 26.5–33)
MCHC RBC AUTO-ENTMCNC: 31.5 G/DL (ref 31.5–36.5)
MCV RBC AUTO: 91 FL (ref 78–100)
PLATELET # BLD AUTO: 249 10E3/UL (ref 150–450)
POTASSIUM SERPL-SCNC: 4.4 MMOL/L (ref 3.4–5.3)
PROT/CREAT 24H UR: 0.2 MG/MG CR (ref 0–0.2)
RBC # BLD AUTO: 3.78 10E6/UL (ref 3.8–5.2)
SODIUM SERPL-SCNC: 138 MMOL/L (ref 135–145)
TACROLIMUS BLD-MCNC: 4.7 UG/L (ref 5–15)
TME LAST DOSE: ABNORMAL H
TME LAST DOSE: ABNORMAL H
WBC # BLD AUTO: 5.5 10E3/UL (ref 4–11)

## 2023-10-09 PROCEDURE — 85027 COMPLETE CBC AUTOMATED: CPT

## 2023-10-09 PROCEDURE — 36415 COLL VENOUS BLD VENIPUNCTURE: CPT

## 2023-10-09 PROCEDURE — 80197 ASSAY OF TACROLIMUS: CPT

## 2023-10-09 PROCEDURE — 84156 ASSAY OF PROTEIN URINE: CPT

## 2023-10-09 PROCEDURE — 82310 ASSAY OF CALCIUM: CPT

## 2023-10-09 RX ORDER — VANCOMYCIN HYDROCHLORIDE 125 MG/1
125 CAPSULE ORAL 4 TIMES DAILY
Qty: 16 CAPSULE | Refills: 0 | Status: SHIPPED | OUTPATIENT
Start: 2023-10-09 | End: 2023-11-27

## 2023-10-09 NOTE — TELEPHONE ENCOUNTER
Spoke with Mona about her labs. Her creatinine is down from Friday and she is feeling much better- no dizziness or fever in the past 48 hours. Her stools are becoming more normal (less frequent and loose) since starting cipro and vanco last week. She is urinating an average of 150 mL/hour. No UTI symptoms.     Message sent to Dr. Mcintyre reviewing labs and current state, okay to remove PNT.

## 2023-10-11 ENCOUNTER — VIRTUAL VISIT (OUTPATIENT)
Dept: UROLOGY | Facility: CLINIC | Age: 33
End: 2023-10-11

## 2023-10-11 ENCOUNTER — MEDICAL CORRESPONDENCE (OUTPATIENT)
Dept: HEALTH INFORMATION MANAGEMENT | Facility: CLINIC | Age: 33
End: 2023-10-11

## 2023-10-11 DIAGNOSIS — T86.19 HYDRONEPHROSIS OF KIDNEY TRANSPLANT: Primary | ICD-10-CM

## 2023-10-11 DIAGNOSIS — N13.30 HYDRONEPHROSIS OF KIDNEY TRANSPLANT: Primary | ICD-10-CM

## 2023-10-11 DIAGNOSIS — Q62.10 URETERAL STENOSIS: Primary | ICD-10-CM

## 2023-10-11 PROCEDURE — 99214 OFFICE O/P EST MOD 30 MIN: CPT | Mod: VID | Performed by: UROLOGY

## 2023-10-11 NOTE — PROGRESS NOTES
"Virtual Visit Details    Type of service:  Video Visit   Video Start Time: {video visit start/end time for provider to select:823229}  Video End Time:{video visit start/end time for provider to select:181639}    Originating Location (pt. Location): {video visit patient location:743624::\"Home\"}  {PROVIDER LOCATION On-site should be selected for visits conducted from your clinic location or adjoining [facility] hospital, academic office, or other nearby [facility] building. Off-site should be selected for all other provider locations, including home:283762}  Distant Location (provider location):  {virtual location provider:562845}  Platform used for Video Visit: {Virtual Visit Platforms:813237::\"Innotas\"}  "

## 2023-10-11 NOTE — PROGRESS NOTES
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 Johnson c/b hydronephrosis, now s/p cystourethroscopy with retrograde pyelography, ureteroscopy and ureteral stent placement on 12/6/19. She had another cystoscopy retrograde pyelogram on 8/20/20 that showed mild hydronephrosis and no strictures in the transplant ureter.  Lasix Renogram with the stent showed a somewhat improved T1/2 (9/22/20) but was otherwise similar to pre-stent level.  11/23/20 another stent was placed for hydronephrosis and possible obstruction on Lasix Renogram. Creatinine 1.0 at that time. Stent was subsequently removed.    11/23/20 cystogram showed transplant ureter reflux    Transplant kidney hydronephrosis   6/9/23 percutaneous nephrostomy tube placed at 25 weeks of pregnancy due to edema.      Hyperparathyroidism  4/29/23 Removed 3.5 parathyroid glands      Plan  Check creatinine tomorrow.  Plan to uncap if Creatinine if above 1.2. If creatinine below 1.2 then likely remove percutaneous nephrostomy tube on Friday.  Continue cipro until done in 4-5 days.  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 Johnson complicated by hydronephrosis s/p cystourethroscopy with retrograde pyelography, ureteroscopy and ureteral stent placement on 12/06/2019. Imaging did not show any evidence of filling defects or strictures.    She had another cystoscopy retrograde pyelogram on 8/20/20 that showed mild hydronephrosis and no strictures in the transplant ureter.      Eventually another stent was placed 11/23/20.    5/30/21  She denies any pain or recent infections.    7/28/23  32 weeks pregnant.  Percutaneous nephrostomy tube was placed due to swelling and hypertension.  The  percutaneous nephrostomy tube helped with this.  She was admitted 7/3/23 for urinary tract infection.    10/11/23 Visit and summary of recent events.  9/26/23 Creatinine 1.09  10/1/23 PERCUTANEOUS NEPHROSTOMY TUBE was capped  10/3/23 Creatinine 1.16  10/4/23 Fevers chills.  10/5/23 Cipro started  10/6/23 Creatinine 1.41  10/9/23 Creatinine 1.35      10/3/23 renal ultrasound  I reviewed the radiologic images and report from this radiologic exam.  My independent interpretation is:  Mild to moderate transplant hydronephrosis.      I reviewed the following laboratory data and went over findings with patient:  Recent Labs   Lab Test 04/06/21  0920 03/03/21  0912 02/02/21  0920 12/28/20  0910   WBC 6.7 7.2 6.3 7.2   HGB 12.3 12.6 12.7 12.5    232 237 245     Recent Labs   Lab Test 04/06/21  0920 03/31/21  0900 03/23/21  0920 03/17/21  0926   CR 1.09* 1.00 0.99 0.98   GFRESTIMATED 69 76 77 78   GFRESTBLACK 80 89 89 >90   GLC 91 96 91 92         Video-Visit Details  Video Start Time: 229  Video End Time: 246  Time spent on pre-visit work, post visit work, and documentation on day of visit outside of time during video visit: 3 min  Total time on the day of the visit: 20 min  Originating Location (pt. Location): Home.    Distant Location (provider location):  [facility].  Platform used for Video Visit: [employer]

## 2023-10-11 NOTE — LETTER
10/11/2023       RE: Mona Wagner  [address]  [facility] [address]     Dear Colleague,    Thank you for referring your patient, Mona Wagner, to the [facility]OLOGY [facility] [place] at [facility]. Please see a copy of my visit note below.      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 Johnson c/b hydronephrosis, now s/p cystourethroscopy with retrograde pyelography, ureteroscopy and ureteral stent placement on 12/6/19. She had another cystoscopy retrograde pyelogram on 8/20/20 that showed mild hydronephrosis and no strictures in the transplant ureter.  Lasix Renogram with the stent showed a somewhat improved T1/2 (9/22/20) but was otherwise similar to pre-stent level.  11/23/20 another stent was placed for hydronephrosis and possible obstruction on Lasix Renogram. Creatinine 1.0 at that time. Stent was subsequently removed.    11/23/20 cystogram showed transplant ureter reflux    Transplant kidney hydronephrosis   6/9/23 percutaneous nephrostomy tube placed at 25 weeks of pregnancy due to edema.      Hyperparathyroidism  4/29/23 Removed 3.5 parathyroid glands      Plan  Check creatinine tomorrow.  Plan to uncap if Creatinine if above 1.2. If creatinine below 1.2 then likely remove percutaneous nephrostomy tube on Friday.  Continue cipro until done in 4-5 days.  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 Johnson complicated by hydronephrosis s/p cystourethroscopy with retrograde pyelography, ureteroscopy and ureteral stent placement on 12/06/2019. Imaging did not show any evidence of filling defects or strictures.    She had another cystoscopy retrograde pyelogram on  8/20/20 that showed mild hydronephrosis and no strictures in the transplant ureter.      Eventually another stent was placed 11/23/20.    5/30/21  She denies any pain or recent infections.    7/28/23  32 weeks pregnant.  Percutaneous nephrostomy tube was placed due to swelling and hypertension.  The percutaneous nephrostomy tube helped with this.  She was admitted 7/3/23 for urinary tract infection.    10/11/23 Visit and summary of recent events.  9/26/23 Creatinine 1.09  10/1/23 PERCUTANEOUS NEPHROSTOMY TUBE was capped  10/3/23 Creatinine 1.16  10/4/23 Fevers chills.  10/5/23 Cipro started  10/6/23 Creatinine 1.41  10/9/23 Creatinine 1.35      10/3/23 renal ultrasound  I reviewed the radiologic images and report from this radiologic exam.  My independent interpretation is:  Mild to moderate transplant hydronephrosis.      I reviewed the following laboratory data and went over findings with patient:  Recent Labs   Lab Test 04/06/21  0920 03/03/21  0912 02/02/21  0920 12/28/20  0910   WBC 6.7 7.2 6.3 7.2   HGB 12.3 12.6 12.7 12.5    232 237 245     Recent Labs   Lab Test 04/06/21  0920 03/31/21  0900 03/23/21  0920 03/17/21  0926   CR 1.09* 1.00 0.99 0.98   GFRESTIMATED 69 76 77 78   GFRESTBLACK 80 89 89 >90   GLC 91 96 91 92         Video-Visit Details  Video Start Time: 229  Video End Time: 246  Time spent on pre-visit work, post visit work, and documentation on day of visit outside of time during video visit: 3 min  Total time on the day of the visit: 20 min  Originating Location (pt. Location): Home.    Distant Location (provider location):  [facility].  Platform used for Video Visit: [employer]      Sincerely,    Wally Britt MD

## 2023-10-12 ENCOUNTER — PATIENT OUTREACH (OUTPATIENT)
Dept: CARE COORDINATION | Facility: CLINIC | Age: 33
End: 2023-10-12
Payer: MEDICARE

## 2023-10-12 NOTE — PROGRESS NOTES
Anemia Management Note  SUBJECTIVE/OBJECTIVE:  Referred by Dr. Gelacio Mcintyre on 2023  Primary Diagnosis: Anemia in Chronic Kidney Disease (N18.3, D63.1)   3a  Secondary Diagnosis:  Chronic Kidney Disease, Stage 3 (N18.3)  3a  Hgb goal range:  9-10  Kidney Tx: 8/3/2019  Epo/Darbo: Aranesp 40mcg every 14 days for Hgb <10.0. In Clinic.   Iron regimen:  Prenatal vit with Iron.   *Elevated Ferritin levels.   Labs : 2024  Recent CHARLINE use, transfusion, IV iron: Aranesp .  RX/TX plans : 2024     *Prefers [facility]      Hx of DVT (), High Risk Pregnancy (2nd trimester).     Contact: OK to speak with Stephan Wagner (father) and Savanah Wagner (sister) regarding Medical Information per consent to communicate dated 4/3/2020.        Latest Ref Rng & Units 2023     2:34 AM 2023     9:34 AM 2023     9:25 AM 2023    10:11 AM 10/3/2023     9:52 AM 10/6/2023     9:41 AM 10/9/2023     9:33 AM   Anemia   Hemoglobin 11.7 - 15.7 g/dL 11.2  11.6  11.5  11.9  12.1  11.1  10.8    Ferritin 6 - 175 ng/mL     808        BP Readings from Last 3 Encounters:   10/06/23 98/62   10/06/23 107/71   23 107/72     Wt Readings from Last 2 Encounters:   10/06/23 68 kg (150 lb)   23 69.4 kg (153 lb)         ASSESSMENT:  Hgb:Above goal - recommend hold dose  TSat: not at goal (>30%) but ferritin >500ng/mL.  IV iron not indicated at this time per anemia protocol. Ferritin: At goal (>100ng/mL)    PLAN:  Hold Aranesp and RTC for hgb then aranesp if needed in 2-4 week(s)    Orders needed to be renewed (for next follow-up date) in EPIC: None    Iron labs due:  every 4 weeks, early 2023    Plan discussed with:  no call, chart reviewed    NEXT FOLLOW-UP DATE:  [id number], review labs    Carrie Leopold, RN BSN  Anemia Services  [facility]  [email or web address]  Office: [phone]  Fax [phone]  **Please Note: Anemia Services will be short staffed during the week of  through  October 23. Please reach out to your nurse coordinator for any questions or concerns during this time.

## 2023-10-13 ENCOUNTER — ALLIED HEALTH/NURSE VISIT (OUTPATIENT)
Dept: UROLOGY | Facility: CLINIC | Age: 33
End: 2023-10-13
Payer: MEDICARE

## 2023-10-13 ENCOUNTER — LAB (OUTPATIENT)
Dept: LAB | Facility: HOSPITAL | Age: 33
End: 2023-10-13
Payer: MEDICARE

## 2023-10-13 DIAGNOSIS — N13.30 HYDRONEPHROSIS OF KIDNEY TRANSPLANT: ICD-10-CM

## 2023-10-13 DIAGNOSIS — T86.19 HYDRONEPHROSIS OF KIDNEY TRANSPLANT: ICD-10-CM

## 2023-10-13 DIAGNOSIS — R33.9 URINARY RETENTION: Primary | ICD-10-CM

## 2023-10-13 DIAGNOSIS — T86.19 HYDRONEPHROSIS OF KIDNEY TRANSPLANT: Primary | ICD-10-CM

## 2023-10-13 DIAGNOSIS — R33.9 URINARY RETENTION: ICD-10-CM

## 2023-10-13 DIAGNOSIS — N13.30 HYDRONEPHROSIS OF KIDNEY TRANSPLANT: Primary | ICD-10-CM

## 2023-10-13 LAB
ALBUMIN MFR UR ELPH: <4 MG/DL
ANION GAP SERPL CALCULATED.3IONS-SCNC: 11 MMOL/L (ref 7–15)
BUN SERPL-MCNC: 22.6 MG/DL (ref 6–20)
CALCIUM SERPL-MCNC: 9.5 MG/DL (ref 8.6–10)
CHLORIDE SERPL-SCNC: 103 MMOL/L (ref 98–107)
CREAT SERPL-MCNC: 1.2 MG/DL (ref 0.51–0.95)
CREAT UR-MCNC: 23.2 MG/DL
DEPRECATED HCO3 PLAS-SCNC: 24 MMOL/L (ref 22–29)
EGFRCR SERPLBLD CKD-EPI 2021: 62 ML/MIN/1.73M2
ERYTHROCYTE [DISTWIDTH] IN BLOOD BY AUTOMATED COUNT: 12.8 % (ref 10–15)
GLUCOSE SERPL-MCNC: 108 MG/DL (ref 70–99)
HCT VFR BLD AUTO: 37.1 % (ref 35–47)
HGB BLD-MCNC: 11.7 G/DL (ref 11.7–15.7)
MCH RBC QN AUTO: 28.5 PG (ref 26.5–33)
MCHC RBC AUTO-ENTMCNC: 31.5 G/DL (ref 31.5–36.5)
MCV RBC AUTO: 90 FL (ref 78–100)
PLATELET # BLD AUTO: 329 10E3/UL (ref 150–450)
POTASSIUM SERPL-SCNC: 4.2 MMOL/L (ref 3.4–5.3)
PROT/CREAT 24H UR: NORMAL MG/G{CREAT}
RBC # BLD AUTO: 4.11 10E6/UL (ref 3.8–5.2)
SODIUM SERPL-SCNC: 138 MMOL/L (ref 135–145)
TACROLIMUS BLD-MCNC: 4.8 UG/L (ref 5–15)
TME LAST DOSE: ABNORMAL H
TME LAST DOSE: ABNORMAL H
WBC # BLD AUTO: 6.3 10E3/UL (ref 4–11)

## 2023-10-13 PROCEDURE — 36415 COLL VENOUS BLD VENIPUNCTURE: CPT

## 2023-10-13 PROCEDURE — 80048 BASIC METABOLIC PNL TOTAL CA: CPT

## 2023-10-13 PROCEDURE — 85027 COMPLETE CBC AUTOMATED: CPT

## 2023-10-13 PROCEDURE — 99207 PR NO CHARGE NURSE ONLY: CPT

## 2023-10-13 PROCEDURE — 84156 ASSAY OF PROTEIN URINE: CPT

## 2023-10-13 PROCEDURE — 80197 ASSAY OF TACROLIMUS: CPT

## 2023-10-13 NOTE — PROGRESS NOTES
Patient came in at request of MD for PNT removal.  This author double-checked patient's creatinine score before pulling PNT.  Sutures removed without difficulty.  Then PNT removed with only slight resistance.  Patient had small amount of bleeding which stopped after pressure was held for 1 minute.  The patient insertion site was dressed and the patient was given additional dressing supplies.      Jericho Miller RN  RN Care Coordinator - Urology

## 2023-10-16 ENCOUNTER — TELEPHONE (OUTPATIENT)
Dept: ENDOCRINOLOGY | Facility: CLINIC | Age: 33
End: 2023-10-16
Payer: MEDICARE

## 2023-10-16 DIAGNOSIS — R73.01 IMPAIRED FASTING GLUCOSE: Primary | ICD-10-CM

## 2023-10-16 NOTE — TELEPHONE ENCOUNTER
Pt to do tsh, Hgba1c, prolactin in 3 months, return in 7 months - called pt - information below relayed to her    -  Dear Mona    Here are your labs  - looks like I will still need to follow you.  I think we can just watch the prolactin and the hemoglobin A1c for now.  Hemoglobin A1c is a marker for prediabetes.  Usually in this case we would recommend that you eat less carbs and be superhealthy with your eating and recheck labs in 3 months.  Please let me know if you have any questions or concerns.    If you have any questions, please feel free to contact my nurse at [phone] select option #3 for triage nurse  or  option #1 for scheduling related questions.    Regards    Katrin Lopez MD

## 2023-10-17 ENCOUNTER — TELEPHONE (OUTPATIENT)
Dept: ENDOCRINOLOGY | Facility: CLINIC | Age: 33
End: 2023-10-17
Payer: MEDICARE

## 2023-10-17 PROBLEM — A04.72 CLOSTRIDIUM DIFFICILE COLITIS: Status: ACTIVE | Noted: 2023-08-25

## 2023-10-18 ENCOUNTER — LAB (OUTPATIENT)
Dept: LAB | Facility: HOSPITAL | Age: 33
End: 2023-10-18
Payer: MEDICARE

## 2023-10-18 ENCOUNTER — TELEPHONE (OUTPATIENT)
Dept: TRANSPLANT | Facility: CLINIC | Age: 33
End: 2023-10-18

## 2023-10-18 DIAGNOSIS — Z94.0 KIDNEY REPLACED BY TRANSPLANT: ICD-10-CM

## 2023-10-18 DIAGNOSIS — N18.31 STAGE 3A CHRONIC KIDNEY DISEASE (H): ICD-10-CM

## 2023-10-18 DIAGNOSIS — Z48.298 AFTERCARE FOLLOWING ORGAN TRANSPLANT: ICD-10-CM

## 2023-10-18 DIAGNOSIS — D63.1 ANEMIA OF CHRONIC RENAL FAILURE, STAGE 3A (H): ICD-10-CM

## 2023-10-18 DIAGNOSIS — N18.31 ANEMIA OF CHRONIC RENAL FAILURE, STAGE 3A (H): ICD-10-CM

## 2023-10-18 LAB
ALBUMIN MFR UR ELPH: <4 MG/DL
ANION GAP SERPL CALCULATED.3IONS-SCNC: 12 MMOL/L (ref 7–15)
BUN SERPL-MCNC: 17.7 MG/DL (ref 6–20)
CALCIUM SERPL-MCNC: 9.6 MG/DL (ref 8.6–10)
CHLORIDE SERPL-SCNC: 102 MMOL/L (ref 98–107)
CREAT SERPL-MCNC: 1.12 MG/DL (ref 0.51–0.95)
CREAT UR-MCNC: 25.1 MG/DL
DEPRECATED HCO3 PLAS-SCNC: 23 MMOL/L (ref 22–29)
EGFRCR SERPLBLD CKD-EPI 2021: 68 ML/MIN/1.73M2
ERYTHROCYTE [DISTWIDTH] IN BLOOD BY AUTOMATED COUNT: 13.1 % (ref 10–15)
FERRITIN SERPL-MCNC: 771 NG/ML (ref 6–175)
GLUCOSE SERPL-MCNC: 107 MG/DL (ref 70–99)
HCT VFR BLD AUTO: 37.1 % (ref 35–47)
HGB BLD-MCNC: 11.9 G/DL (ref 11.7–15.7)
IRON BINDING CAPACITY (ROCHE): 217 UG/DL (ref 240–430)
IRON SATN MFR SERPL: 26 % (ref 15–46)
IRON SERPL-MCNC: 56 UG/DL (ref 37–145)
MCH RBC QN AUTO: 28.9 PG (ref 26.5–33)
MCHC RBC AUTO-ENTMCNC: 32.1 G/DL (ref 31.5–36.5)
MCV RBC AUTO: 90 FL (ref 78–100)
PLATELET # BLD AUTO: 304 10E3/UL (ref 150–450)
POTASSIUM SERPL-SCNC: 4.3 MMOL/L (ref 3.4–5.3)
PROT/CREAT 24H UR: NORMAL MG/G{CREAT}
RBC # BLD AUTO: 4.12 10E6/UL (ref 3.8–5.2)
SODIUM SERPL-SCNC: 137 MMOL/L (ref 135–145)
TACROLIMUS BLD-MCNC: 5.5 UG/L (ref 5–15)
TME LAST DOSE: NORMAL H
TME LAST DOSE: NORMAL H
WBC # BLD AUTO: 6.8 10E3/UL (ref 4–11)

## 2023-10-18 PROCEDURE — 36415 COLL VENOUS BLD VENIPUNCTURE: CPT

## 2023-10-18 PROCEDURE — 80197 ASSAY OF TACROLIMUS: CPT

## 2023-10-18 PROCEDURE — 85027 COMPLETE CBC AUTOMATED: CPT

## 2023-10-18 PROCEDURE — 83550 IRON BINDING TEST: CPT

## 2023-10-18 PROCEDURE — 82728 ASSAY OF FERRITIN: CPT

## 2023-10-18 PROCEDURE — 82310 ASSAY OF CALCIUM: CPT

## 2023-10-18 PROCEDURE — 84156 ASSAY OF PROTEIN URINE: CPT

## 2023-10-18 ASSESSMENT — ENCOUNTER SYMPTOMS: NEW SYMPTOMS OF CORONARY ARTERY DISEASE: 0

## 2023-10-20 PROBLEM — R79.89 ELEVATED SERUM CREATININE: Status: RESOLVED | Noted: 2023-10-06 | Resolved: 2023-10-20

## 2023-10-20 PROBLEM — E87.20 METABOLIC ACIDOSIS: Status: ACTIVE | Noted: 2023-10-20

## 2023-10-20 PROBLEM — K81.0 ACUTE CHOLECYSTITIS: Status: RESOLVED | Noted: 2023-09-08 | Resolved: 2023-10-20

## 2023-10-20 NOTE — PATIENT INSTRUCTIONS
Patient Recommendations:  - Start ciprofloxacin 500 mg bid x10 days.  - Would also start oral vancomycin 125 mg twice daily x 12 days to prevent C. Diff infection.    Transplant Patient Information  Your Post Transplant Coordinator is: Delicia Gaona   For non urgent items, we encourage you to contact your coordinator/care team online via [employer]  You and your care team can also contact your transplant coordinator Monday - Friday, 8am - 5pm at [phone] (Option 2 to reach the coordinator or Option 4 to schedule an appointment).  After hours for urgent matters, please call [facility] at [phone].

## 2023-10-27 ENCOUNTER — LAB (OUTPATIENT)
Dept: LAB | Facility: HOSPITAL | Age: 33
End: 2023-10-27
Payer: MEDICARE

## 2023-10-27 DIAGNOSIS — Z94.0 KIDNEY REPLACED BY TRANSPLANT: ICD-10-CM

## 2023-10-27 DIAGNOSIS — Z48.298 AFTERCARE FOLLOWING ORGAN TRANSPLANT: ICD-10-CM

## 2023-10-27 LAB
ANION GAP SERPL CALCULATED.3IONS-SCNC: 10 MMOL/L (ref 7–15)
BUN SERPL-MCNC: 20 MG/DL (ref 6–20)
CALCIUM SERPL-MCNC: 9.9 MG/DL (ref 8.6–10)
CHLORIDE SERPL-SCNC: 103 MMOL/L (ref 98–107)
CREAT SERPL-MCNC: 1.17 MG/DL (ref 0.51–0.95)
DEPRECATED HCO3 PLAS-SCNC: 25 MMOL/L (ref 22–29)
EGFRCR SERPLBLD CKD-EPI 2021: 64 ML/MIN/1.73M2
ERYTHROCYTE [DISTWIDTH] IN BLOOD BY AUTOMATED COUNT: 13 % (ref 10–15)
GLUCOSE SERPL-MCNC: 140 MG/DL (ref 70–99)
HCT VFR BLD AUTO: 35.9 % (ref 35–47)
HGB BLD-MCNC: 11.3 G/DL (ref 11.7–15.7)
MCH RBC QN AUTO: 28.5 PG (ref 26.5–33)
MCHC RBC AUTO-ENTMCNC: 31.5 G/DL (ref 31.5–36.5)
MCV RBC AUTO: 90 FL (ref 78–100)
PLATELET # BLD AUTO: 207 10E3/UL (ref 150–450)
POTASSIUM SERPL-SCNC: 4.5 MMOL/L (ref 3.4–5.3)
RBC # BLD AUTO: 3.97 10E6/UL (ref 3.8–5.2)
SODIUM SERPL-SCNC: 138 MMOL/L (ref 135–145)
TACROLIMUS BLD-MCNC: 5.8 UG/L (ref 5–15)
TME LAST DOSE: NORMAL H
TME LAST DOSE: NORMAL H
WBC # BLD AUTO: 6.6 10E3/UL (ref 4–11)

## 2023-10-27 PROCEDURE — 80048 BASIC METABOLIC PNL TOTAL CA: CPT

## 2023-10-27 PROCEDURE — 36415 COLL VENOUS BLD VENIPUNCTURE: CPT

## 2023-10-27 PROCEDURE — 86832 HLA CLASS I HIGH DEFIN QUAL: CPT

## 2023-10-27 PROCEDURE — 86833 HLA CLASS II HIGH DEFIN QUAL: CPT

## 2023-10-27 PROCEDURE — 85027 COMPLETE CBC AUTOMATED: CPT

## 2023-10-27 PROCEDURE — 80197 ASSAY OF TACROLIMUS: CPT

## 2023-10-31 DIAGNOSIS — D84.9 IMMUNOSUPPRESSED STATUS (H): Primary | ICD-10-CM

## 2023-10-31 DIAGNOSIS — Z94.0 KIDNEY REPLACED BY TRANSPLANT: ICD-10-CM

## 2023-11-02 ENCOUNTER — PATIENT OUTREACH (OUTPATIENT)
Dept: CARE COORDINATION | Facility: CLINIC | Age: 33
End: 2023-11-02
Payer: MEDICARE

## 2023-11-02 NOTE — PROGRESS NOTES
Referred by Dr. Gelacio Mcintyre on 05/16/2023     Hgb has been stable since 7/31/23 with no intervention.    Patient meets criteria for discharge from Anemia Clinic with at least 12 weeks without iron or CHARLINE therapy.        Latest Ref Rng & Units 9/26/2023    10:11 AM 10/3/2023     9:52 AM 10/6/2023     9:41 AM 10/9/2023     9:33 AM 10/13/2023     9:35 AM 10/18/2023     9:32 AM 10/27/2023     9:32 AM   Anemia   Hemoglobin 11.7 - 15.7 g/dL 11.9  12.1  11.1  10.8  11.7  11.9  11.3    Ferritin 6 - 175 ng/mL  808     771         Anemia labs discontinued, therapy plans cancelled, removed from active patient list, and referring provider notified.  MyC message sent to Mona re: discharge    Carrie Leopold, RN BSN  Anemia Services  [facility]  [email or web address]  Office: [phone]  Fax [phone]

## 2023-11-07 ENCOUNTER — LAB (OUTPATIENT)
Dept: LAB | Facility: HOSPITAL | Age: 33
End: 2023-11-07
Payer: MEDICARE

## 2023-11-07 DIAGNOSIS — D84.9 IMMUNOSUPPRESSED STATUS (H): ICD-10-CM

## 2023-11-07 DIAGNOSIS — Z94.0 KIDNEY REPLACED BY TRANSPLANT: ICD-10-CM

## 2023-11-07 LAB
ANION GAP SERPL CALCULATED.3IONS-SCNC: 12 MMOL/L (ref 7–15)
BUN SERPL-MCNC: 20.4 MG/DL (ref 6–20)
CALCIUM SERPL-MCNC: 9.4 MG/DL (ref 8.6–10)
CHLORIDE SERPL-SCNC: 104 MMOL/L (ref 98–107)
CREAT SERPL-MCNC: 1.18 MG/DL (ref 0.51–0.95)
DEPRECATED HCO3 PLAS-SCNC: 25 MMOL/L (ref 22–29)
DONOR IDENTIFICATION: NORMAL
DSA COMMENTS: NORMAL
DSA PRESENT: NO
DSA TEST METHOD: NORMAL
EGFRCR SERPLBLD CKD-EPI 2021: 63 ML/MIN/1.73M2
ERYTHROCYTE [DISTWIDTH] IN BLOOD BY AUTOMATED COUNT: 13.2 % (ref 10–15)
GLUCOSE SERPL-MCNC: 105 MG/DL (ref 70–99)
HCT VFR BLD AUTO: 36.4 % (ref 35–47)
HGB BLD-MCNC: 11.5 G/DL (ref 11.7–15.7)
MCH RBC QN AUTO: 28.2 PG (ref 26.5–33)
MCHC RBC AUTO-ENTMCNC: 31.6 G/DL (ref 31.5–36.5)
MCV RBC AUTO: 89 FL (ref 78–100)
ORGAN: NORMAL
PLATELET # BLD AUTO: 236 10E3/UL (ref 150–450)
POTASSIUM SERPL-SCNC: 4.7 MMOL/L (ref 3.4–5.3)
RBC # BLD AUTO: 4.08 10E6/UL (ref 3.8–5.2)
SA 1 CELL: NORMAL
SA 1 TEST METHOD: NORMAL
SA 2 CELL: NORMAL
SA 2 TEST METHOD: NORMAL
SA1 HI RISK ABY: NORMAL
SA1 MOD RISK ABY: NORMAL
SA2 HI RISK ABY: NORMAL
SA2 MOD RISK ABY: NORMAL
SODIUM SERPL-SCNC: 141 MMOL/L (ref 135–145)
TACROLIMUS BLD-MCNC: 5.6 UG/L (ref 5–15)
TME LAST DOSE: NORMAL H
TME LAST DOSE: NORMAL H
UNACCEPTABLE ANTIGENS: NORMAL
UNOS CPRA: 26
WBC # BLD AUTO: 7.1 10E3/UL (ref 4–11)
ZZZSA 1  COMMENTS: NORMAL
ZZZSA 2 COMMENTS: NORMAL

## 2023-11-07 PROCEDURE — 36415 COLL VENOUS BLD VENIPUNCTURE: CPT

## 2023-11-07 PROCEDURE — 85027 COMPLETE CBC AUTOMATED: CPT

## 2023-11-07 PROCEDURE — 82310 ASSAY OF CALCIUM: CPT

## 2023-11-07 PROCEDURE — 80197 ASSAY OF TACROLIMUS: CPT

## 2023-11-16 ENCOUNTER — LAB (OUTPATIENT)
Dept: LAB | Facility: HOSPITAL | Age: 33
End: 2023-11-16
Payer: MEDICARE

## 2023-11-16 DIAGNOSIS — Z48.298 AFTERCARE FOLLOWING ORGAN TRANSPLANT: ICD-10-CM

## 2023-11-16 DIAGNOSIS — Z94.0 KIDNEY REPLACED BY TRANSPLANT: ICD-10-CM

## 2023-11-16 DIAGNOSIS — D84.9 IMMUNOSUPPRESSED STATUS (H): ICD-10-CM

## 2023-11-16 LAB
ANION GAP SERPL CALCULATED.3IONS-SCNC: 11 MMOL/L (ref 7–15)
BUN SERPL-MCNC: 25.8 MG/DL (ref 6–20)
CALCIUM SERPL-MCNC: 9.6 MG/DL (ref 8.6–10)
CHLORIDE SERPL-SCNC: 102 MMOL/L (ref 98–107)
CREAT SERPL-MCNC: 1.16 MG/DL (ref 0.51–0.95)
DEPRECATED HCO3 PLAS-SCNC: 25 MMOL/L (ref 22–29)
EGFRCR SERPLBLD CKD-EPI 2021: 65 ML/MIN/1.73M2
ERYTHROCYTE [DISTWIDTH] IN BLOOD BY AUTOMATED COUNT: 13.3 % (ref 10–15)
GLUCOSE SERPL-MCNC: 101 MG/DL (ref 70–99)
HCT VFR BLD AUTO: 35.2 % (ref 35–47)
HGB BLD-MCNC: 10.8 G/DL (ref 11.7–15.7)
MCH RBC QN AUTO: 27.6 PG (ref 26.5–33)
MCHC RBC AUTO-ENTMCNC: 30.7 G/DL (ref 31.5–36.5)
MCV RBC AUTO: 90 FL (ref 78–100)
PLATELET # BLD AUTO: 237 10E3/UL (ref 150–450)
POTASSIUM SERPL-SCNC: 4.4 MMOL/L (ref 3.4–5.3)
RBC # BLD AUTO: 3.92 10E6/UL (ref 3.8–5.2)
SODIUM SERPL-SCNC: 138 MMOL/L (ref 135–145)
TACROLIMUS BLD-MCNC: 5.1 UG/L (ref 5–15)
TME LAST DOSE: NORMAL H
TME LAST DOSE: NORMAL H
WBC # BLD AUTO: 7 10E3/UL (ref 4–11)

## 2023-11-16 PROCEDURE — 36415 COLL VENOUS BLD VENIPUNCTURE: CPT

## 2023-11-16 PROCEDURE — 80197 ASSAY OF TACROLIMUS: CPT

## 2023-11-16 PROCEDURE — 80048 BASIC METABOLIC PNL TOTAL CA: CPT

## 2023-11-16 PROCEDURE — 85027 COMPLETE CBC AUTOMATED: CPT

## 2023-11-17 DIAGNOSIS — Z48.298 AFTERCARE FOLLOWING ORGAN TRANSPLANT: ICD-10-CM

## 2023-11-17 DIAGNOSIS — Z94.0 KIDNEY REPLACED BY TRANSPLANT: ICD-10-CM

## 2023-11-17 DIAGNOSIS — D84.9 IMMUNOSUPPRESSED STATUS (H): Primary | ICD-10-CM

## 2023-11-22 DIAGNOSIS — Z94.0 KIDNEY REPLACED BY TRANSPLANT: ICD-10-CM

## 2023-11-22 DIAGNOSIS — D84.9 IMMUNOSUPPRESSED STATUS (H): Primary | ICD-10-CM

## 2023-11-22 DIAGNOSIS — Z48.298 AFTERCARE FOLLOWING ORGAN TRANSPLANT: ICD-10-CM

## 2023-11-27 ENCOUNTER — TELEPHONE (OUTPATIENT)
Dept: ENDOCRINOLOGY | Facility: CLINIC | Age: 33
End: 2023-11-27

## 2023-11-27 ENCOUNTER — OFFICE VISIT (OUTPATIENT)
Dept: TRANSPLANT | Facility: CLINIC | Age: 33
End: 2023-11-27
Attending: INTERNAL MEDICINE
Payer: MEDICARE

## 2023-11-27 ENCOUNTER — LAB (OUTPATIENT)
Dept: LAB | Facility: HOSPITAL | Age: 33
End: 2023-11-27
Payer: MEDICARE

## 2023-11-27 VITALS
HEART RATE: 75 BPM | TEMPERATURE: 98.1 F | BODY MASS INDEX: 29.02 KG/M2 | DIASTOLIC BLOOD PRESSURE: 66 MMHG | SYSTOLIC BLOOD PRESSURE: 101 MMHG | WEIGHT: 148.6 LBS | OXYGEN SATURATION: 96 %

## 2023-11-27 DIAGNOSIS — E58 CALCIUM DEFICIENCY: ICD-10-CM

## 2023-11-27 DIAGNOSIS — Z94.0 KIDNEY REPLACED BY TRANSPLANT: ICD-10-CM

## 2023-11-27 DIAGNOSIS — K80.70 CALCULUS OF GALLBLADDER AND BILE DUCT WITHOUT CHOLECYSTITIS OR OBSTRUCTION: ICD-10-CM

## 2023-11-27 DIAGNOSIS — Z48.298 AFTERCARE FOLLOWING ORGAN TRANSPLANT: ICD-10-CM

## 2023-11-27 DIAGNOSIS — D84.9 IMMUNOSUPPRESSED STATUS (H): ICD-10-CM

## 2023-11-27 DIAGNOSIS — R19.7 DIARRHEA OF PRESUMED INFECTIOUS ORIGIN: ICD-10-CM

## 2023-11-27 DIAGNOSIS — E83.42 HYPOMAGNESEMIA: ICD-10-CM

## 2023-11-27 DIAGNOSIS — E03.9 ACQUIRED HYPOTHYROIDISM: ICD-10-CM

## 2023-11-27 DIAGNOSIS — R19.7 DIARRHEA, UNSPECIFIED TYPE: Primary | ICD-10-CM

## 2023-11-27 DIAGNOSIS — Z94.0 KIDNEY REPLACED BY TRANSPLANT: Primary | ICD-10-CM

## 2023-11-27 PROBLEM — N18.2 ANEMIA IN STAGE 2 CHRONIC KIDNEY DISEASE: Status: ACTIVE | Noted: 2023-05-23

## 2023-11-27 PROBLEM — F41.9 ANXIETY: Status: RESOLVED | Noted: 2019-08-12 | Resolved: 2023-11-27

## 2023-11-27 PROBLEM — A04.72 CLOSTRIDIUM DIFFICILE COLITIS: Status: RESOLVED | Noted: 2023-08-25 | Resolved: 2023-11-27

## 2023-11-27 PROBLEM — E87.20 METABOLIC ACIDOSIS: Status: RESOLVED | Noted: 2023-10-20 | Resolved: 2023-11-27

## 2023-11-27 LAB
ALBUMIN MFR UR ELPH: 9.2 MG/DL
ANION GAP SERPL CALCULATED.3IONS-SCNC: 11 MMOL/L (ref 7–15)
BUN SERPL-MCNC: 24.8 MG/DL (ref 6–20)
CALCIUM SERPL-MCNC: 9.7 MG/DL (ref 8.6–10)
CALCIUM SERPL-MCNC: 9.7 MG/DL (ref 8.6–10)
CHLORIDE SERPL-SCNC: 101 MMOL/L (ref 98–107)
CREAT SERPL-MCNC: 1.18 MG/DL (ref 0.51–0.95)
CREAT UR-MCNC: 87.4 MG/DL
DEPRECATED HCO3 PLAS-SCNC: 26 MMOL/L (ref 22–29)
EGFRCR SERPLBLD CKD-EPI 2021: 63 ML/MIN/1.73M2
ERYTHROCYTE [DISTWIDTH] IN BLOOD BY AUTOMATED COUNT: 13.4 % (ref 10–15)
GLUCOSE SERPL-MCNC: 100 MG/DL (ref 70–99)
HCT VFR BLD AUTO: 36.3 % (ref 35–47)
HGB BLD-MCNC: 11.6 G/DL (ref 11.7–15.7)
MAGNESIUM SERPL-MCNC: 1.8 MG/DL (ref 1.7–2.3)
MCH RBC QN AUTO: 28.1 PG (ref 26.5–33)
MCHC RBC AUTO-ENTMCNC: 32 G/DL (ref 31.5–36.5)
MCV RBC AUTO: 88 FL (ref 78–100)
PLATELET # BLD AUTO: 226 10E3/UL (ref 150–450)
POTASSIUM SERPL-SCNC: 4.4 MMOL/L (ref 3.4–5.3)
PROT/CREAT 24H UR: 0.11 MG/MG CR (ref 0–0.2)
RBC # BLD AUTO: 4.13 10E6/UL (ref 3.8–5.2)
SODIUM SERPL-SCNC: 138 MMOL/L (ref 135–145)
T3FREE SERPL-MCNC: 2.7 PG/ML (ref 2–4.4)
T4 FREE SERPL-MCNC: 1.4 NG/DL (ref 0.9–1.7)
TACROLIMUS BLD-MCNC: 4.6 UG/L (ref 5–15)
TME LAST DOSE: ABNORMAL H
TME LAST DOSE: ABNORMAL H
TSH SERPL DL<=0.005 MIU/L-ACNC: 2.04 UIU/ML (ref 0.3–4.2)
WBC # BLD AUTO: 7.5 10E3/UL (ref 4–11)

## 2023-11-27 PROCEDURE — 80048 BASIC METABOLIC PNL TOTAL CA: CPT

## 2023-11-27 PROCEDURE — 84443 ASSAY THYROID STIM HORMONE: CPT

## 2023-11-27 PROCEDURE — 80197 ASSAY OF TACROLIMUS: CPT

## 2023-11-27 PROCEDURE — 36415 COLL VENOUS BLD VENIPUNCTURE: CPT

## 2023-11-27 PROCEDURE — 84481 FREE ASSAY (FT-3): CPT

## 2023-11-27 PROCEDURE — G0463 HOSPITAL OUTPT CLINIC VISIT: HCPCS | Performed by: INTERNAL MEDICINE

## 2023-11-27 PROCEDURE — 84156 ASSAY OF PROTEIN URINE: CPT

## 2023-11-27 PROCEDURE — 85027 COMPLETE CBC AUTOMATED: CPT

## 2023-11-27 PROCEDURE — 84439 ASSAY OF FREE THYROXINE: CPT

## 2023-11-27 PROCEDURE — 99214 OFFICE O/P EST MOD 30 MIN: CPT | Performed by: INTERNAL MEDICINE

## 2023-11-27 PROCEDURE — 83735 ASSAY OF MAGNESIUM: CPT

## 2023-11-27 RX ORDER — PATIROMER 8.4 G/1
8.4 POWDER, FOR SUSPENSION ORAL DAILY PRN
COMMUNITY
Start: 2023-11-27

## 2023-11-27 RX ORDER — LACTOBACILLUS RHAMNOSUS GG 10B CELL
1 CAPSULE ORAL DAILY
COMMUNITY
Start: 2023-11-27 | End: 2024-06-13

## 2023-11-27 ASSESSMENT — PAIN SCALES - GENERAL: PAINLEVEL: NO PAIN (0)

## 2023-11-27 NOTE — NURSING NOTE
Chief Complaint   Patient presents with    RECHECK     Follow up post kideny post pregnancy.     /66   Pulse 75   Temp 98.1  F (36.7  C) (Oral)   Wt 67.4 kg (148 lb 9.6 oz)   SpO2 96%   BMI 29.02 kg/m    [place]son [place] on 11/27/2023 at 2:55 PM

## 2023-11-27 NOTE — TELEPHONE ENCOUNTER
Labs noted below    -  Dear Mona    Here are your thyroid and calcium labs which are NORMAL!    If you have any questions, please feel free to contact my nurse at [phone] select option #3 for triage nurse  or  option #1 for scheduling related questions.    Regards    Katrin Lopez MD     Lab on 11/27/2023   Component Date Value Ref Range Status     TSH 11/27/2023 2.04  0.30 - 4.20 uIU/mL Final     T3 Free 11/27/2023 2.7  2.0 - 4.4 pg/mL Final     Free T4 11/27/2023 1.40  0.90 - 1.70 ng/dL Final     Calcium 11/27/2023 9.7  8.6 - 10.0 mg/dL Final     WBC Count 11/27/2023 7.5  4.0 - 11.0 10e3/uL Final     RBC Count 11/27/2023 4.13  3.80 - 5.20 10e6/uL Final     Hemoglobin 11/27/2023 11.6 (L)  11.7 - 15.7 g/dL Final     Hematocrit 11/27/2023 36.3  35.0 - 47.0 % Final     MCV 11/27/2023 88  78 - 100 fL Final     MCH 11/27/2023 28.1  26.5 - 33.0 pg Final     MCHC 11/27/2023 32.0  31.5 - 36.5 g/dL Final     RDW 11/27/2023 13.4  10.0 - 15.0 % Final     Platelet Count 11/27/2023 226  150 - 450 10e3/uL Final     Sodium 11/27/2023 138  135 - 145 mmol/L Final    Reference intervals for this test were updated on 09/26/2023 to more accurately reflect our healthy population. There may be differences in the flagging of prior results with similar values performed with this method. Interpretation of those prior results can be made in the context of the updated reference intervals.      Potassium 11/27/2023 4.4  3.4 - 5.3 mmol/L Final     Chloride 11/27/2023 101  98 - 107 mmol/L Final     Carbon Dioxide (CO2) 11/27/2023 26  22 - 29 mmol/L Final     Anion Gap 11/27/2023 11  7 - 15 mmol/L Final     Urea Nitrogen 11/27/2023 24.8 (H)  6.0 - 20.0 mg/dL Final     Creatinine 11/27/2023 1.18 (H)  0.51 - 0.95 mg/dL Final     GFR Estimate 11/27/2023 63  >60 mL/min/1.73m2 Final     Calcium 11/27/2023 9.7  8.6 - 10.0 mg/dL Final     Glucose 11/27/2023 100 (H)  70 - 99 mg/dL Final     Total Protein Urine mg/dL 11/27/2023 9.2    mg/dL Final      Total Protein Urine mg/mg Creat 11/27/2023 0.11  0.00 - 0.20 mg/mg Cr Final     Creatinine Urine mg/dL 11/27/2023 87.4  mg/dL Final     Magnesium 11/27/2023 1.8  1.7 - 2.3 mg/dL Final

## 2023-11-27 NOTE — LETTER
11/27/2023         RE: Mona Wagner  [address]  [facility] [address]        Dear Colleague,    Thank you for referring your patient, Mona Wagner, to the [facility]. Please see a copy of my visit note below.      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    # Immunosuppression: Tacrolimus immediate release (goal 4-6) and Mycophenolic acid (dose 540 mg every 12 hours)   - Continue with intensive monitoring of immunosuppression for efficacy and toxicity.   - Changes: Not at this time; Changed off azathioprine back to mycophenolic acid following pregnancy.    # Infection Prophylaxis:   Last CD4 Level: 201 (Jul/2020)  - PJP: None    # Blood Pressure: Controlled;  Goal BP: > 100, but < 130 systolic   - Changes: Not at this time; Patient was on Florinef prior to pregnancy.  Would encourage good hydration.    # Anemia in Chronic Renal Disease: Hgb: Stable      CHARLINE: No   - Iron studies: Replete    # Mineral Bone Disorder: Patient is s/p parathyroidectomy 4/2023 with 3.5 glands removed.  - Tertiary renal hyperparathyroidism; PTH level:  Low (<15 pg/ml)         On treatment: None  - Vitamin D; level: Not checked recently        On supplement: Yes  - Calcium; level: Normal        On supplement: Yes    # Electrolytes:   - Potassium; level: Normal        On supplement: No  - Magnesium; level: Not checked recently        On supplement: Yes  - Bicarbonate; level: Normal        On supplement: No    # Kidney Transplant Ureteral Stenosis/Hydronephrosis:   -Patient with moderate hydronephrosis of kidney transplant during pregnancyand developed CAMERON.  She underwent  PNT 6/9/23 with repeat kidney transplant ultrasound 6/11/23 still showing moderate hydronephrosis suggesting this may be a functional hydronephrosis from pregnancy  - She has a previous h/o ureteral stenosis with ureteral stent removed 2/2021.  Previous kidney transplant ultrasound 7/2021 also showed moderate hydronephrosis unchanged with voiding. Hydronephrosis was unchanged on 7/6/22 abdominal CT.    -PNT removed 10/13/23 and Scr has remained stable    # Cholelithiasis:   -Went to the ED on 9/8/23 and was diagnosed with choleilithisis. She recently re-introduced fat into her diet and since 11/19 she has been having RUQ pain a few hours after eating as well as watery stool   -Will refer to transplant surgery for consideration of cholecystectomy     # Diarrhea:   -Has been going again this week. With history of C.diff will obtain C.diff and enteric panel     # Pregnancy/Preeclampsia: Patient delivered at 32 weeks 6 days with some preeclampsia symptoms.  Proteinuria and decreased and other symptoms have resolved.    # GERD: Asymptomatic and now off medications.    # Hyperprolactinemia: Slightly elevated prolactin at 25 at last check 10/6/23.  Felt secondary to hypothyroidism.  Followed by Endocrinology.     # Right Axillary Lymph Node: Patient was referred to general surgery for excisional biopsy in 8/2022, but decision was made to watch the node.  Node appears to be stable for the last year at least and per patient she has had it for > 5 years with no changes in size.    # C.diff:   -Pt has completed treatment with PO vanc and ppx dose with abx.     # Skin Cancer Risk:    - Discussed sun protection and recommend regular follow up with Dermatology.    # Family planning:   -Per patient she may want to get pregnant again. She will think about it. I did tell her that she almost certainly would develop hydronephrosis requiring PNT again. Her Scr did increase to ~1.2 when it was 0.8-1 pre pregnancy and this could increase  again.    -When she would like to get pregnant again would change back to AZA from MPA and ask her to see high risk OB again    # Medical Compliance: Yes    # Health Maintenance and Vaccination Review: Recommend:  Shingrix    # Transplant History:  Etiology of Kidney Failure: Unknown etiology (no kidney biopsy)  Tx: DDKT  Transplant: 8/3/2019 (Kidney)  Significant changes in immunosuppression:  Changed from mycophenolate to azathioprine for pregnancy.  Significant transplant-related complications: Transplant ureteral stenosis    Transplant Office Phone Number: [phone]    Assessment and plan was discussed with the patient and she voiced her understanding and agreement.    Return visit: Return in about 6 months (around 5/27/2024).    Gelacio Mcintyre MD    Chief Complaint  Ms. Wagner is a 33 year old here for kidney transplant and immunosuppression management.    History of Present Illness  Since 11/19 she has been having RUQ pain a few hours after eating since reintroducing fat into her diet. She also has been having a few days of diarrhea with eating as well. She denies vomiting, SOB. She develops the RUQ pain but is unsure if she has chest pain as well. She denies LE edema. She develops pain in RLQ as well occasinoally where her PNT was.     Home BP:  115 systolic       Problem List  Patient Active Problem List   Diagnosis     DVT of upper extremity (deep vein thrombosis) (H)     Seizure disorder (H)     Acquired hypothyroidism     Hypomagnesemia     Aftercare following organ transplant     Immunosuppressed status (H24)     Kidney replaced by transplant     Anxiety     Vitamin D deficiency     CKD (chronic kidney disease) stage 2, GFR 60-89 ml/min     Diarrhea     Bicornate uterus     Hydronephrosis of kidney transplant     Ureteral stenosis     Anemia in chronic renal disease     Clostridium difficile colitis     Metabolic acidosis       Allergies  Allergies   Allergen Reactions     Contrast Dye Rash     CT  contrast allergy 12/14/19 rash over eyes. Need to have pre medication before a CT WITH CONTRAST      Diatrizoate Rash     CT contrast allergy 12/14/19 rash over eyes. Need to have pre medication before a CT WITH CONTRAST      Lisinopril Swelling     angioedema     Nitrous Oxide Other (See Comments)     Sense of doom     Chlorhexidine Rash     Rash at site     Sulfa Antibiotics Rash     Muscle stiffness of neck     Dapsone      Methemoglobinemia     Furosemide Other (See Comments)     Skin flushing     Metrogel [Metronidazole]      Hives diffusely on body after vaginal administration.     Azithromycin Dizziness and Rash     Cefuroxime Rash       Medications  Current Outpatient Medications   Medication Sig     acetaminophen (TYLENOL) 325 MG tablet Take 2 tablets (650 mg) by mouth every 4 hours as needed for mild pain     cefpodoxime (VANTIN) 200 MG tablet Take 1 tablet (200 mg) by mouth 2 times daily (Patient not taking: Reported on 10/6/2023)     ciprofloxacin (CIPRO) 500 MG tablet Take 1 tablet (500 mg) by mouth 2 times daily     fluticasone (FLONASE) 50 MCG/ACT nasal spray Spray 1 spray into both nostrils daily     lactase (LACTAID) 3000 UNIT tablet Take 3,000 Units by mouth 3 times daily (with meals)     levothyroxine (SYNTHROID/LEVOTHROID) 25 MCG tablet Take 1 tablet (25 mcg) by mouth daily     magnesium oxide (MAG-OX) 400 MG tablet Take 1 tablet (400 mg) by mouth every morning     mycophenolic acid (GENERIC EQUIVALENT) 180 MG EC tablet Take 3 tablets (540 mg) by mouth 2 times daily     norethindrone (MICRONOR) 0.35 MG tablet Take 1 tablet (0.35 mg) by mouth daily     patiromer (VELTASSA) 8.4 g packet Take 8.4 g by mouth daily as needed (as directed by your transplant team, for potassium = or > 5.5) (Patient not taking: Reported on 9/21/2023)     polyethylene glycol (MIRALAX) 17 GM/Dose powder Take 17 g (1 capful) by mouth daily as needed for constipation     simethicone (MYLICON) 125 MG chewable tablet Take 125  mg by mouth daily     tacrolimus (GENERIC EQUIVALENT) 1 MG capsule Take 3 capsules (3 mg) by mouth 2 times daily     tacrolimus (GENERIC) 0.5 MG capsule Take 0.5 mg by mouth 2 times daily Pt states taking 3.5 mg in morning and 3mg at night     vancomycin (VANCOCIN) 125 MG capsule Take 1 capsule (125 mg) by mouth 4 times daily     vancomycin (VANCOCIN) 125 MG capsule Take 1 capsule (125 mg) by mouth 4 times daily     vancomycin (VANCOCIN) 125 MG capsule Take 1 capsule (125 mg) by mouth 4 times daily     No current facility-administered medications for this visit.     Facility-Administered Medications Ordered in Other Visits   Medication     iopamidol (ISOVUE-250) solution 100 mL     Medications Discontinued During This Encounter   Medication Reason     cefpodoxime (VANTIN) 200 MG tablet      ciprofloxacin (CIPRO) 500 MG tablet      fluticasone (FLONASE) 50 MCG/ACT nasal spray      patiromer (VELTASSA) 8.4 g packet Reorder (No AVS)     polyethylene glycol (MIRALAX) 17 GM/Dose powder      vancomycin (VANCOCIN) 125 MG capsule      vancomycin (VANCOCIN) 125 MG capsule      vancomycin (VANCOCIN) 125 MG capsule          Physical Exam    Blood pressure 101/66, pulse 75, temperature 98.1  F (36.7  C), temperature source Oral, weight 67.4 kg (148 lb 9.6 oz), SpO2 96%, unknown if currently breastfeeding.   GENERAL APPEARANCE: alert and no distress  HENT: mouth without ulcers or lesions  LYMPHATICS: no cervical or supraclavicular nodes  RESP: lungs clear to auscultation - no rales, rhonchi or wheezes  CV: regular rhythm, normal rate, no rub, no murmur  EDEMA: no LE edema bilaterally  ABDOMEN: soft, nondistended, nontender, bowel sounds normal  MS: extremities normal - no gross deformities noted, no evidence of inflammation in joints, no muscle tenderness  SKIN: no rash  NEURO: normal strength and tone, sensory exam grossly normal, mentation intact and speech normal  PSYCH: mentation appears normal and affect normal/bright  TX  KIDNEY: normal  DIALYSIS ACCESS:  LUE AV fistula with good thrill      Data        Latest Ref Rng & Units 11/27/2023     9:36 AM 11/16/2023     9:41 AM 11/7/2023     9:36 AM   Renal   Sodium 135 - 145 mmol/L 138  138  141    K 3.4 - 5.3 mmol/L 4.4  4.4  4.7    Cl 98 - 107 mmol/L 101  102  104    Cl (external) 98 - 107 mmol/L 101  102  104    CO2 22 - 29 mmol/L 26  25  25    Urea Nitrogen 6.0 - 20.0 mg/dL 24.8  25.8  20.4    Creatinine 0.51 - 0.95 mg/dL 1.18  1.16  1.18    Glucose 70 - 99 mg/dL 100  101  105    Calcium 8.6 - 10.0 mg/dL  8.6 - 10.0 mg/dL 9.7     9.7  9.6  9.4          Latest Ref Rng & Units 10/3/2023     9:52 AM 7/13/2023    10:15 AM 6/15/2023    10:09 AM   Bone Health   Phosphorus 2.5 - 4.5 mg/dL  3.3  3.6    Vit D Def 20 - 50 ng/mL 46            Latest Ref Rng & Units 11/27/2023     9:36 AM 11/16/2023     9:41 AM 11/7/2023     9:36 AM   Heme   WBC 4.0 - 11.0 10e3/uL 7.5  7.0  7.1    Hgb 11.7 - 15.7 g/dL 11.6  10.8  11.5    Plt 150 - 450 10e3/uL 226  237  236          Latest Ref Rng & Units 9/8/2023     2:34 AM 8/14/2023    12:49 PM 8/13/2023     6:33 AM   Liver   AP 35 - 104 U/L 79  112  104    TBili <=1.2 mg/dL 0.5  0.7  0.8    ALT 0 - 50 U/L 15  36  30    AST 0 - 45 U/L 19  36  29    Tot Protein 6.4 - 8.3 g/dL 7.2  6.3  5.9    Albumin 3.5 - 5.2 g/dL 4.1  3.2  2.9          Latest Ref Rng & Units 10/6/2023     9:41 AM 2/6/2023    10:23 AM 6/22/2022     6:39 AM   Pancreas   A1C <5.7 % 5.7  5.2     Lipase 0 - 52 U/L   39          Latest Ref Rng & Units 10/18/2023     9:32 AM 10/3/2023     9:52 AM 7/31/2023    10:17 AM   Iron studies   Iron 37 - 145 ug/dL 56  40  94    Iron Sat Index 15 - 46 % 26  18  35    Ferritin 6 - 175 ng/mL 771  808  1,525          Latest Ref Rng & Units 7/7/2021     9:20 AM 6/2/2021     9:25 AM 5/3/2021     9:02 AM   [facility] Txp Virology   BK Spec  Plasma  Plasma  Plasma    BK Res BKNEG^BK Virus DNA Not Detected copies/mL BK Virus DNA Not Detected  BK Virus DNA Not Detected  BK  Virus DNA Not Detected    BK Log <2.7 Log copies/mL Not Calculated  Not Calculated  Not Calculated        Recent Labs   Lab Test 10/27/23  0932 11/07/23  0936 11/16/23  0941   DOSTAC 10/26/2023 11/6/2023 11/15/2023   TACROL 5.8 5.6 5.1     Recent Labs   Lab Test 08/16/19  0807 09/03/19  0944   DOSMPA Not Provided 0840pm   MPACID 1.92 3.39   MPAG 149.2* 52.8         Again, thank you for allowing me to participate in the care of your patient.        Sincerely,        Gelacio Mcintyre MD

## 2023-11-27 NOTE — PATIENT INSTRUCTIONS
Patient Recommendations:  - I will refer you to transplant surgery for consideration of cholecystectomy  -Stool studies  -Recommend Shingrix vaccine    Transplant Patient Information  Your Post Transplant Coordinator is: Delicia Gaona   For non urgent items, we encourage you to contact your coordinator/care team online via [employer]  You and your care team can also contact your transplant coordinator Monday - Friday, 8am - 5pm at [phone] (Option 2 to reach the coordinator or Option 4 to schedule an appointment).  After hours for urgent matters, please call [facility] at [phone].

## 2023-11-27 NOTE — PROGRESS NOTES
TRANSPLANT NEPHROLOGY CHRONIC POST TRANSPLANT VISIT    Assessment & Plan   # DDKT: Stable creatinine even after PNT removal on 10/13/23   - Baseline Creatinine:  ~ 1.1-1.3   - Proteinuria: Minimal (0.2-0.5 grams)   - Date DSA Last Checked: Sep/2022      Latest DSA: No cPRA: 25%   - BK Viremia: No   - Kidney Tx Biopsy: Sep 17, 2019; Result: No diagnostic evidence of acute rejection.    # Immunosuppression: Tacrolimus immediate release (goal 4-6) and Mycophenolic acid (dose 540 mg every 12 hours)   - Continue with intensive monitoring of immunosuppression for efficacy and toxicity.   - Changes: Not at this time; Changed off azathioprine back to mycophenolic acid following pregnancy.    # Infection Prophylaxis:   Last CD4 Level: 201 (Jul/2020)  - PJP: None    # Blood Pressure: Controlled;  Goal BP: > 100, but < 130 systolic   - Changes: Not at this time; Patient was on Florinef prior to pregnancy.  Would encourage good hydration.    # Anemia in Chronic Renal Disease: Hgb: Stable      CHARLINE: No   - Iron studies: Replete    # Mineral Bone Disorder: Patient is s/p parathyroidectomy 4/2023 with 3.5 glands removed.  - Tertiary renal hyperparathyroidism; PTH level:  Low (<15 pg/ml)         On treatment: None  - Vitamin D; level: Not checked recently        On supplement: Yes  - Calcium; level: Normal        On supplement: Yes    # Electrolytes:   - Potassium; level: Normal        On supplement: No  - Magnesium; level: Not checked recently        On supplement: Yes  - Bicarbonate; level: Normal        On supplement: No    # Kidney Transplant Ureteral Stenosis/Hydronephrosis:   -Patient with moderate hydronephrosis of kidney transplant during pregnancyand developed CAMERON.  She underwent PNT 6/9/23 with repeat kidney transplant ultrasound 6/11/23 still showing moderate hydronephrosis suggesting this may be a functional hydronephrosis from pregnancy  - She has a previous h/o ureteral stenosis with ureteral stent removed 2/2021.   Previous kidney transplant ultrasound 7/2021 also showed moderate hydronephrosis unchanged with voiding. Hydronephrosis was unchanged on 7/6/22 abdominal CT.    -PNT removed 10/13/23 and Scr has remained stable    # Cholelithiasis:   -Went to the ED on 9/8/23 and was diagnosed with choleilithisis. She recently re-introduced fat into her diet and since 11/19 she has been having RUQ pain a few hours after eating as well as watery stool   -Will refer to transplant surgery for consideration of cholecystectomy     # Diarrhea:   -Has been going again this week. With history of C.diff will obtain C.diff and enteric panel     # Pregnancy/Preeclampsia: Patient delivered at 32 weeks 6 days with some preeclampsia symptoms.  Proteinuria and decreased and other symptoms have resolved.    # GERD: Asymptomatic and now off medications.    # Hyperprolactinemia: Slightly elevated prolactin at 25 at last check 10/6/23.  Felt secondary to hypothyroidism.  Followed by Endocrinology.     # Right Axillary Lymph Node: Patient was referred to general surgery for excisional biopsy in 8/2022, but decision was made to watch the node.  Node appears to be stable for the last year at least and per patient she has had it for > 5 years with no changes in size.    # C.diff:   -Pt has completed treatment with PO vanc and ppx dose with abx.     # Skin Cancer Risk:    - Discussed sun protection and recommend regular follow up with Dermatology.    # Family planning:   -Per patient she may want to get pregnant again. She will think about it. I did tell her that she almost certainly would develop hydronephrosis requiring PNT again. Her Scr did increase to ~1.2 when it was 0.8-1 pre pregnancy and this could increase again.    -When she would like to get pregnant again would change back to AZA from MPA and ask her to see high risk OB again    # Medical Compliance: Yes    # Health Maintenance and Vaccination Review: Recommend:  Shingrix    # Transplant  History:  Etiology of Kidney Failure: Unknown etiology (no kidney biopsy)  Tx: DDKT  Transplant: 8/3/2019 (Kidney)  Significant changes in immunosuppression:  Changed from mycophenolate to azathioprine for pregnancy.  Significant transplant-related complications: Transplant ureteral stenosis    Transplant Office Phone Number: [phone]    Assessment and plan was discussed with the patient and she voiced her understanding and agreement.    Return visit: Return in about 6 months (around 5/27/2024).    Gelacio Mcintyre MD    Chief Complaint   Ms. Wagner is a 33 year old here for kidney transplant and immunosuppression management.    History of Present Illness   Since 11/19 she has been having RUQ pain a few hours after eating since reintroducing fat into her diet. She also has been having a few days of diarrhea with eating as well. She denies vomiting, SOB. She develops the RUQ pain but is unsure if she has chest pain as well. She denies LE edema. She develops pain in RLQ as well occasinoally where her PNT was.     Home BP:  115 systolic       Problem List   Patient Active Problem List   Diagnosis    DVT of upper extremity (deep vein thrombosis) (H)    Seizure disorder (H)    Acquired hypothyroidism    Hypomagnesemia    Aftercare following organ transplant    Immunosuppressed status (H24)    Kidney replaced by transplant    Anxiety    Vitamin D deficiency    CKD (chronic kidney disease) stage 2, GFR 60-89 ml/min    Diarrhea    Bicornate uterus    Hydronephrosis of kidney transplant    Ureteral stenosis    Anemia in chronic renal disease    Clostridium difficile colitis    Metabolic acidosis       Allergies   Allergies   Allergen Reactions    Contrast Dye Rash     CT contrast allergy 12/14/19 rash over eyes. Need to have pre medication before a CT WITH CONTRAST     Diatrizoate Rash     CT contrast allergy 12/14/19 rash over eyes. Need to have pre medication before a CT WITH CONTRAST     Lisinopril Swelling     angioedema     Nitrous Oxide Other (See Comments)     Sense of doom    Chlorhexidine Rash     Rash at site    Sulfa Antibiotics Rash     Muscle stiffness of neck    Dapsone      Methemoglobinemia    Furosemide Other (See Comments)     Skin flushing    Metrogel [Metronidazole]      Hives diffusely on body after vaginal administration.    Azithromycin Dizziness and Rash    Cefuroxime Rash       Medications   Current Outpatient Medications   Medication Sig    acetaminophen (TYLENOL) 325 MG tablet Take 2 tablets (650 mg) by mouth every 4 hours as needed for mild pain    cefpodoxime (VANTIN) 200 MG tablet Take 1 tablet (200 mg) by mouth 2 times daily (Patient not taking: Reported on 10/6/2023)    ciprofloxacin (CIPRO) 500 MG tablet Take 1 tablet (500 mg) by mouth 2 times daily    fluticasone (FLONASE) 50 MCG/ACT nasal spray Spray 1 spray into both nostrils daily    lactase (LACTAID) 3000 UNIT tablet Take 3,000 Units by mouth 3 times daily (with meals)    levothyroxine (SYNTHROID/LEVOTHROID) 25 MCG tablet Take 1 tablet (25 mcg) by mouth daily    magnesium oxide (MAG-OX) 400 MG tablet Take 1 tablet (400 mg) by mouth every morning    mycophenolic acid (GENERIC EQUIVALENT) 180 MG EC tablet Take 3 tablets (540 mg) by mouth 2 times daily    norethindrone (MICRONOR) 0.35 MG tablet Take 1 tablet (0.35 mg) by mouth daily    patiromer (VELTASSA) 8.4 g packet Take 8.4 g by mouth daily as needed (as directed by your transplant team, for potassium = or > 5.5) (Patient not taking: Reported on 9/21/2023)    polyethylene glycol (MIRALAX) 17 GM/Dose powder Take 17 g (1 capful) by mouth daily as needed for constipation    simethicone (MYLICON) 125 MG chewable tablet Take 125 mg by mouth daily    tacrolimus (GENERIC EQUIVALENT) 1 MG capsule Take 3 capsules (3 mg) by mouth 2 times daily    tacrolimus (GENERIC) 0.5 MG capsule Take 0.5 mg by mouth 2 times daily Pt states taking 3.5 mg in morning and 3mg at night    vancomycin (VANCOCIN) 125 MG capsule  Take 1 capsule (125 mg) by mouth 4 times daily    vancomycin (VANCOCIN) 125 MG capsule Take 1 capsule (125 mg) by mouth 4 times daily    vancomycin (VANCOCIN) 125 MG capsule Take 1 capsule (125 mg) by mouth 4 times daily     No current facility-administered medications for this visit.     Facility-Administered Medications Ordered in Other Visits   Medication    iopamidol (ISOVUE-250) solution 100 mL     Medications Discontinued During This Encounter   Medication Reason    cefpodoxime (VANTIN) 200 MG tablet     ciprofloxacin (CIPRO) 500 MG tablet     fluticasone (FLONASE) 50 MCG/ACT nasal spray     patiromer (VELTASSA) 8.4 g packet Reorder (No AVS)    polyethylene glycol (MIRALAX) 17 GM/Dose powder     vancomycin (VANCOCIN) 125 MG capsule     vancomycin (VANCOCIN) 125 MG capsule     vancomycin (VANCOCIN) 125 MG capsule          Physical Exam     Blood pressure 101/66, pulse 75, temperature 98.1  F (36.7  C), temperature source Oral, weight 67.4 kg (148 lb 9.6 oz), SpO2 96%, unknown if currently breastfeeding.   GENERAL APPEARANCE: alert and no distress  HENT: mouth without ulcers or lesions  LYMPHATICS: no cervical or supraclavicular nodes  RESP: lungs clear to auscultation - no rales, rhonchi or wheezes  CV: regular rhythm, normal rate, no rub, no murmur  EDEMA: no LE edema bilaterally  ABDOMEN: soft, nondistended, nontender, bowel sounds normal  MS: extremities normal - no gross deformities noted, no evidence of inflammation in joints, no muscle tenderness  SKIN: no rash  NEURO: normal strength and tone, sensory exam grossly normal, mentation intact and speech normal  PSYCH: mentation appears normal and affect normal/bright  TX KIDNEY: normal  DIALYSIS ACCESS:  LUE AV fistula with good thrill      Data         Latest Ref Rng & Units 11/27/2023     9:36 AM 11/16/2023     9:41 AM 11/7/2023     9:36 AM   Renal   Sodium 135 - 145 mmol/L 138  138  141    K 3.4 - 5.3 mmol/L 4.4  4.4  4.7    Cl 98 - 107 mmol/L 101  102   104    Cl (external) 98 - 107 mmol/L 101  102  104    CO2 22 - 29 mmol/L 26  25  25    Urea Nitrogen 6.0 - 20.0 mg/dL 24.8  25.8  20.4    Creatinine 0.51 - 0.95 mg/dL 1.18  1.16  1.18    Glucose 70 - 99 mg/dL 100  101  105    Calcium 8.6 - 10.0 mg/dL  8.6 - 10.0 mg/dL 9.7     9.7  9.6  9.4          Latest Ref Rng & Units 10/3/2023     9:52 AM 7/13/2023    10:15 AM 6/15/2023    10:09 AM   Bone Health   Phosphorus 2.5 - 4.5 mg/dL  3.3  3.6    Vit D Def 20 - 50 ng/mL 46            Latest Ref Rng & Units 11/27/2023     9:36 AM 11/16/2023     9:41 AM 11/7/2023     9:36 AM   Heme   WBC 4.0 - 11.0 10e3/uL 7.5  7.0  7.1    Hgb 11.7 - 15.7 g/dL 11.6  10.8  11.5    Plt 150 - 450 10e3/uL 226  237  236          Latest Ref Rng & Units 9/8/2023     2:34 AM 8/14/2023    12:49 PM 8/13/2023     6:33 AM   Liver   AP 35 - 104 U/L 79  112  104    TBili <=1.2 mg/dL 0.5  0.7  0.8    ALT 0 - 50 U/L 15  36  30    AST 0 - 45 U/L 19  36  29    Tot Protein 6.4 - 8.3 g/dL 7.2  6.3  5.9    Albumin 3.5 - 5.2 g/dL 4.1  3.2  2.9          Latest Ref Rng & Units 10/6/2023     9:41 AM 2/6/2023    10:23 AM 6/22/2022     6:39 AM   Pancreas   A1C <5.7 % 5.7  5.2     Lipase 0 - 52 U/L   39          Latest Ref Rng & Units 10/18/2023     9:32 AM 10/3/2023     9:52 AM 7/31/2023    10:17 AM   Iron studies   Iron 37 - 145 ug/dL 56  40  94    Iron Sat Index 15 - 46 % 26  18  35    Ferritin 6 - 175 ng/mL 771  808  1,525          Latest Ref Rng & Units 7/7/2021     9:20 AM 6/2/2021     9:25 AM 5/3/2021     9:02 AM   [facility] Txp Virology   BK Spec  Plasma  Plasma  Plasma    BK Res BKNEG^BK Virus DNA Not Detected copies/mL BK Virus DNA Not Detected  BK Virus DNA Not Detected  BK Virus DNA Not Detected    BK Log <2.7 Log copies/mL Not Calculated  Not Calculated  Not Calculated        Recent Labs   Lab Test 10/27/23  0932 11/07/23  0936 11/16/23  0941   DOSTAC 10/26/2023 11/6/2023 11/15/2023   TACROL 5.8 5.6 5.1     Recent Labs   Lab Test 08/16/19  0807 09/03/19  0944    DOSMPA Not Provided 0840pm   MPACID 1.92 3.39   MPAG 149.2* 52.8

## 2023-11-28 DIAGNOSIS — Z94.0 KIDNEY REPLACED BY TRANSPLANT: ICD-10-CM

## 2023-11-28 DIAGNOSIS — D84.9 IMMUNOSUPPRESSED STATUS (H): ICD-10-CM

## 2023-11-28 DIAGNOSIS — K80.70 CALCULUS OF GALLBLADDER AND BILE DUCT WITHOUT CHOLECYSTITIS OR OBSTRUCTION: Primary | ICD-10-CM

## 2023-11-28 NOTE — PROGRESS NOTES
Gelacio Mcintyre MD McLennan, Sarah, ERMELINDA  I asked Cee BINGHAM to get her into txp surgery clinic for evaluation for lap shital due to continued cholelithiasis.    She is having diarrhea and asked for C.diff and enteric panel to be sent.    Thanks,  Gelacio    Orders placed for transplant surgery follow up.

## 2023-11-29 ENCOUNTER — OFFICE VISIT (OUTPATIENT)
Dept: FAMILY MEDICINE | Facility: CLINIC | Age: 33
End: 2023-11-29
Payer: MEDICARE

## 2023-11-29 ENCOUNTER — LAB (OUTPATIENT)
Dept: LAB | Facility: HOSPITAL | Age: 33
End: 2023-11-29
Payer: MEDICARE

## 2023-11-29 VITALS
SYSTOLIC BLOOD PRESSURE: 96 MMHG | DIASTOLIC BLOOD PRESSURE: 64 MMHG | OXYGEN SATURATION: 99 % | WEIGHT: 148 LBS | HEIGHT: 60 IN | HEART RATE: 83 BPM | BODY MASS INDEX: 29.06 KG/M2

## 2023-11-29 DIAGNOSIS — R19.7 DIARRHEA, UNSPECIFIED TYPE: ICD-10-CM

## 2023-11-29 DIAGNOSIS — D63.1 ANEMIA IN STAGE 2 CHRONIC KIDNEY DISEASE: ICD-10-CM

## 2023-11-29 DIAGNOSIS — Z00.00 LABORATORY EXAMINATION ORDERED AS PART OF A ROUTINE GENERAL MEDICAL EXAMINATION: ICD-10-CM

## 2023-11-29 DIAGNOSIS — R10.13 ABDOMINAL PAIN, EPIGASTRIC: Primary | ICD-10-CM

## 2023-11-29 DIAGNOSIS — N18.2 ANEMIA IN STAGE 2 CHRONIC KIDNEY DISEASE: ICD-10-CM

## 2023-11-29 LAB
ADV 40+41 DNA STL QL NAA+NON-PROBE: NEGATIVE
ALBUMIN SERPL BCG-MCNC: 4.6 G/DL (ref 3.5–5.2)
ALP SERPL-CCNC: 65 U/L (ref 40–150)
ALT SERPL W P-5'-P-CCNC: 25 U/L (ref 0–50)
ANION GAP SERPL CALCULATED.3IONS-SCNC: 11 MMOL/L (ref 7–15)
AST SERPL W P-5'-P-CCNC: 17 U/L (ref 0–45)
ASTRO TYP 1-8 RNA STL QL NAA+NON-PROBE: NEGATIVE
BILIRUB SERPL-MCNC: 0.8 MG/DL
BUN SERPL-MCNC: 25.4 MG/DL (ref 6–20)
C CAYETANENSIS DNA STL QL NAA+NON-PROBE: NEGATIVE
C DIFF GDH STL QL IA: POSITIVE
C DIFF TOX A+B STL QL IA: NEGATIVE
C DIFF TOX B STL QL: POSITIVE
CALCIUM SERPL-MCNC: 9.5 MG/DL (ref 8.6–10)
CAMPYLOBACTER DNA SPEC NAA+PROBE: NEGATIVE
CHLORIDE SERPL-SCNC: 103 MMOL/L (ref 98–107)
CHOLEST SERPL-MCNC: 236 MG/DL
CREAT SERPL-MCNC: 1.15 MG/DL (ref 0.51–0.95)
CREAT UR-MCNC: 47.2 MG/DL
CRP SERPL-MCNC: 13.5 MG/L
CRYPTOSP DNA STL QL NAA+NON-PROBE: NEGATIVE
DEPRECATED HCO3 PLAS-SCNC: 22 MMOL/L (ref 22–29)
E COLI O157 DNA STL QL NAA+NON-PROBE: ABNORMAL
E HISTOLYT DNA STL QL NAA+NON-PROBE: NEGATIVE
EAEC ASTA GENE ISLT QL NAA+PROBE: NEGATIVE
EC STX1+STX2 GENES STL QL NAA+NON-PROBE: NEGATIVE
EGFRCR SERPLBLD CKD-EPI 2021: 65 ML/MIN/1.73M2
EPEC EAE GENE STL QL NAA+NON-PROBE: NEGATIVE
ERYTHROCYTE [DISTWIDTH] IN BLOOD BY AUTOMATED COUNT: 13.3 % (ref 10–15)
ERYTHROCYTE [SEDIMENTATION RATE] IN BLOOD BY WESTERGREN METHOD: 68 MM/HR (ref 0–20)
ETEC LTA+ST1A+ST1B TOX ST NAA+NON-PROBE: NEGATIVE
G LAMBLIA DNA STL QL NAA+NON-PROBE: NEGATIVE
GLUCOSE SERPL-MCNC: 92 MG/DL (ref 70–99)
HCT VFR BLD AUTO: 38.3 % (ref 35–47)
HDLC SERPL-MCNC: 29 MG/DL
HGB BLD-MCNC: 11.7 G/DL (ref 11.7–15.7)
LDLC SERPL CALC-MCNC: 178 MG/DL
LIPASE SERPL-CCNC: 41 U/L (ref 13–60)
MCH RBC QN AUTO: 27.5 PG (ref 26.5–33)
MCHC RBC AUTO-ENTMCNC: 30.5 G/DL (ref 31.5–36.5)
MCV RBC AUTO: 90 FL (ref 78–100)
MICROALBUMIN UR-MCNC: 22.5 MG/L
MICROALBUMIN/CREAT UR: 47.67 MG/G CR (ref 0–25)
NONHDLC SERPL-MCNC: 207 MG/DL
NOROVIRUS GI+II RNA STL QL NAA+NON-PROBE: POSITIVE
P SHIGELLOIDES DNA STL QL NAA+NON-PROBE: NEGATIVE
PLATELET # BLD AUTO: 231 10E3/UL (ref 150–450)
POTASSIUM SERPL-SCNC: 4.1 MMOL/L (ref 3.4–5.3)
PROT SERPL-MCNC: 8.1 G/DL (ref 6.4–8.3)
RBC # BLD AUTO: 4.25 10E6/UL (ref 3.8–5.2)
RVA RNA STL QL NAA+NON-PROBE: NEGATIVE
SALMONELLA SP RPOD STL QL NAA+PROBE: NEGATIVE
SAPO I+II+IV+V RNA STL QL NAA+NON-PROBE: NEGATIVE
SHIGELLA SP+EIEC IPAH ST NAA+NON-PROBE: NEGATIVE
SODIUM SERPL-SCNC: 136 MMOL/L (ref 135–145)
TRIGL SERPL-MCNC: 143 MG/DL
V CHOLERAE DNA SPEC QL NAA+PROBE: NEGATIVE
VIBRIO DNA SPEC NAA+PROBE: NEGATIVE
WBC # BLD AUTO: 8 10E3/UL (ref 4–11)
Y ENTEROCOL DNA STL QL NAA+PROBE: NEGATIVE

## 2023-11-29 PROCEDURE — 83690 ASSAY OF LIPASE: CPT

## 2023-11-29 PROCEDURE — 85027 COMPLETE CBC AUTOMATED: CPT

## 2023-11-29 PROCEDURE — 85652 RBC SED RATE AUTOMATED: CPT

## 2023-11-29 PROCEDURE — 80053 COMPREHEN METABOLIC PANEL: CPT

## 2023-11-29 PROCEDURE — 80061 LIPID PANEL: CPT

## 2023-11-29 PROCEDURE — 82043 UR ALBUMIN QUANTITATIVE: CPT

## 2023-11-29 PROCEDURE — 87493 C DIFF AMPLIFIED PROBE: CPT

## 2023-11-29 PROCEDURE — 99214 OFFICE O/P EST MOD 30 MIN: CPT | Mod: GC

## 2023-11-29 PROCEDURE — 82570 ASSAY OF URINE CREATININE: CPT

## 2023-11-29 PROCEDURE — 87507 IADNA-DNA/RNA PROBE TQ 12-25: CPT

## 2023-11-29 PROCEDURE — 36415 COLL VENOUS BLD VENIPUNCTURE: CPT

## 2023-11-29 PROCEDURE — 87324 CLOSTRIDIUM AG IA: CPT | Mod: XU

## 2023-11-29 PROCEDURE — 93000 ELECTROCARDIOGRAM COMPLETE: CPT | Mod: GC

## 2023-11-29 PROCEDURE — 86140 C-REACTIVE PROTEIN: CPT

## 2023-11-29 NOTE — PROGRESS NOTES
Assessment & Plan     Abdominal pain, epigastric  Pt seen in the ED 9/8 for acute abdominal px, dx w/ cholelithiasis at that time. Since then has slowly re introduced fat into her diet, now w/ approximately one week of worsened RUQ abdominal px w/ radiation to the R shoulder blade. Worse after meals w/ fatty foods. Reportedly meeting w/ surgery at [facility] 12/18 to discuss possible cholecystectomy for ongoing biliary colic. Also has been having increased watery stools w/ notable hx of two previous episodes of c dif infx. Will get broad infectious workup to identify other potential causes of pt's symptoms.   - Lipase  - C-Reactive Protein  - Erythrocyte sedimentation rate auto  - CBC with platelets  - EKG 12-lead complete w/read - Clinics  - US ABDOMEN LIMITED    S/p renal transplant in 2019  Anemia in stage 2 chronic kidney disease  34 yo F w/ pmh of CKD2, s/p DDKT for presumed IgA nephropathy, post op course complicated by post transplant hydronephrosis and repeat PNT infx.   - Albumin Random Urine Quantitative with Creat Ratio  - Comprehensive metabolic panel  - Lipid panel reflex to direct LDL Non-fasting    BMI:   Estimated body mass index is 28.9 kg/m  as calculated from the following:    Height as of this encounter: 1.524 m (5').    Weight as of this encounter: 67.1 kg (148 lb).     No follow-ups on file.    Aniceto Szymanski MD  [facility]    Elsa Dunn is a 33 year old, presenting for the following health issues:  refarral for kidney (Kidney pain since sept. Wanting to go to unit(s) of  ), diaarhea  (Started on ov 19th ), and Imm/Inj (Question about Hep B vaccine states should be in pharm/)        11/29/2023     8:37 AM   Additional Questions   Roomed by ozzy SOARES     S/p DDKT 8/31/2019  - Hx of likely IgA nephropathy, tranpslant w/ DDK in 2019, hx of several PNT infx since that time  - creat stable since PNT removal 10/13  - on Tacrolimus immediate release  (goal 4-6) and Mycophenolic acid (dose 540 mg every 12 hours)   - Patient with moderate hydronephrosis of kidney transplant during pregnancyand developed CAMERON.  She underwent PNT 6/9/23 with repeat kidney transplant ultrasound 6/11/23 still showing moderate hydronephrosis suggesting this may be a functional hydronephrosis from pregnancy  - She has a previous h/o ureteral stenosis with ureteral stent removed 2/2021.  Previous kidney transplant ultrasound 7/2021 also showed moderate hydronephrosis unchanged with voiding. Hydronephrosis was unchanged on 7/6/22 abdominal CT.      Cholelithiasis  Diarrhea  - Went to the ED on 9/8/23 and was diagnosed with choleilithisis. Positive sonographic tavera's. She recently re-introduced fat into her diet and since 11/19 she has been having RUQ pain a few hours after eating as well as watery stool  - Within 3 days of re introducing fat into diet these episodes began again  - RUQ px started 11/19, started radiating to the R shoulder blade the last several days.   - Describes episodes as sharp pain wrapping around her diaphragm  - Has an appointment w/ [facility] surgery on 12/18  - Since September stools have been very pale   - Hx of C dif x2    Review of Systems   Constitutional, HEENT, cardiovascular, pulmonary, gi and gu systems are negative, except as otherwise noted.      Objective    BP 96/64   Pulse 83   Ht 1.524 m (5')   Wt 67.1 kg (148 lb)   SpO2 99%   BMI 28.90 kg/m    Body mass index is 28.9 kg/m .  Physical Exam    GENERAL: healthy, alert and no distress  NECK: no adenopathy, no asymmetry, masses, or scars and thyroid normal to palpation  RESP: lungs clear to auscultation - no rales, rhonchi or wheezes  CV: regular rate and rhythm, normal S1 S2, no S3 or S4, no murmur, click or rub, no peripheral edema and peripheral pulses strong  ABDOMEN: soft, nontender, no hepatosplenomegaly, no masses and bowel sounds normal  MS: no gross musculoskeletal defects noted, no  edema    Results for orders placed or performed in visit on 11/29/23   Albumin Random Urine Quantitative with Creat Ratio     Status: Abnormal   Result Value Ref Range    Creatinine Urine mg/dL 47.2 mg/dL    Albumin Urine mg/L 22.5 mg/L    Albumin Urine mg/g Cr 47.67 (H) 0.00 - 25.00 mg/g Cr   Lipid panel reflex to direct LDL Non-fasting     Status: Abnormal   Result Value Ref Range    Cholesterol 236 (H) <200 mg/dL    Triglycerides 143 <150 mg/dL    Direct Measure HDL 29 (L) >=50 mg/dL    LDL Cholesterol Calculated 178 (H) <=100 mg/dL    Non HDL Cholesterol 207 (H) <130 mg/dL    Narrative    Cholesterol  Desirable:  <200 mg/dL    Triglycerides  Normal:  Less than 150 mg/dL  Borderline High:  150-199 mg/dL  High:  200-499 mg/dL  Very High:  Greater than or equal to 500 mg/dL    Direct Measure HDL  Female:  Greater than or equal to 50 mg/dL   Male:  Greater than or equal to 40 mg/dL    LDL Cholesterol  Desirable:  <100mg/dL  Above Desirable:  100-129 mg/dL   Borderline High:  130-159 mg/dL   High:  160-189 mg/dL   Very High:  >= 190 mg/dL    Non HDL Cholesterol  Desirable:  130 mg/dL  Above Desirable:  130-159 mg/dL  Borderline High:  160-189 mg/dL  High:  190-219 mg/dL  Very High:  Greater than or equal to 220 mg/dL   Comprehensive metabolic panel     Status: Abnormal   Result Value Ref Range    Sodium 136 135 - 145 mmol/L    Potassium 4.1 3.4 - 5.3 mmol/L    Carbon Dioxide (CO2) 22 22 - 29 mmol/L    Anion Gap 11 7 - 15 mmol/L    Urea Nitrogen 25.4 (H) 6.0 - 20.0 mg/dL    Creatinine 1.15 (H) 0.51 - 0.95 mg/dL    GFR Estimate 65 >60 mL/min/1.73m2    Calcium 9.5 8.6 - 10.0 mg/dL    Chloride 103 98 - 107 mmol/L    Glucose 92 70 - 99 mg/dL    Alkaline Phosphatase 65 40 - 150 U/L    AST 17 0 - 45 U/L    ALT 25 0 - 50 U/L    Protein Total 8.1 6.4 - 8.3 g/dL    Albumin 4.6 3.5 - 5.2 g/dL    Bilirubin Total 0.8 <=1.2 mg/dL   Lipase     Status: Normal   Result Value Ref Range    Lipase 41 13 - 60 U/L   C-Reactive Protein      Status: Abnormal   Result Value Ref Range    CRP Inflammation 13.50 (H) <5.00 mg/L   Erythrocyte sedimentation rate auto     Status: Abnormal   Result Value Ref Range    Erythrocyte Sedimentation Rate 68 (H) 0 - 20 mm/hr   CBC with platelets     Status: Abnormal   Result Value Ref Range    WBC Count 8.0 4.0 - 11.0 10e3/uL    RBC Count 4.25 3.80 - 5.20 10e6/uL    Hemoglobin 11.7 11.7 - 15.7 g/dL    Hematocrit 38.3 35.0 - 47.0 %    MCV 90 78 - 100 fL    MCH 27.5 26.5 - 33.0 pg    MCHC 30.5 (L) 31.5 - 36.5 g/dL    RDW 13.3 10.0 - 15.0 %    Platelet Count 231 150 - 450 10e3/uL   Results for orders placed or performed in visit on 11/29/23   C. difficile Toxin B PCR with reflex to C. difficile Antigen and Toxins A/B EIA     Status: Abnormal    Specimen: Per Rectum; Stool   Result Value Ref Range    C Difficile Toxin B by PCR Positive (A) Negative    Narrative    The [employer] Xpert C. difficile Assay, performed on the [employer]  Instrument [employer], is a qualitative in vitro diagnostic test for rapid detection of toxin B gene sequences from unformed (liquid or soft) stool specimens collected from patients suspected of having Clostridioides difficile infection (CDI). The test utilizes automated real-time polymerase chain reaction (PCR) to detect toxin gene sequences associated with toxin producing C. difficile. The Xpert C. difficile Assay is intended as an aid in the diagnosis of CDI.   Enteric Bacteria and Virus Panel by [employer] Stool     Status: Abnormal    Specimen: Per Rectum; Stool   Result Value Ref Range    Campylobacter species Negative Negative    Salmonella species Negative Negative    Vibrio species Negative Negative    Vibrio cholerae Negative Negative    Yersinia enterocolitica Negative Negative    Enteropathogenic E. coli (EPEC) Negative Negative, NA    Shiga-like toxin-producing E. coli (STEC) Negative Negative    Shigella/Enteroinvasive E. coli (EIEC) Negative Negative    Cryptosporidium species Negative  Negative    Giardia lamblia Negative Negative    Norovirus Gl/Gll Positive (A) Negative    Rotavirus A Negative Negative    Plesiomonas shigelloides Negative Negative    Enteroaggregative E. coli (EAEC) Negative Negative    Enterotoxigenic E. coli (ETEC) Negative Negative    E. coli O157 NA Negative, NA    Cyclospora cayetanensis Negative Negative    Entamoeba histolytica Negative Negative    Adenovirus F40/41 Negative Negative    Astrovirus Negative Negative    Sapovirus Negative Negative    Narrative    Assay performed using the FDA-cleared ISC8 GI Panel from [employer].  A negative result should not rule out infection in patients with a probability for gastrointestinal infection. The assay does not test for all potential infectious agents of diarrheal disease.  Positive results do not distinguish between a viable or replicating organism and the presence of a nonviable organism or nucleic acid, nor do they exclude the possibility of coinfection by organisms not in the panel.  Results are intended to aid in the diagnosis of illness and are meant to be used in conjunction with other clinical findings.  This test has been verified and is performed by the Infectious Diseases Diagnostic Laboratory at [facility]. This laboratory is certified under the Clinical Laboratory Improvement Amendments of 1988 (CLIA-88) as qualified to perform high complexity clinical laboratory testing.   C. difficile Antigen and Toxins A/B by Enzyme Immunoassay     Status: Abnormal    Specimen: Per Rectum; Stool   Result Value Ref Range    C. difficile GDH Antigen Positive (A) Negative    C. difficile Toxin Negative Negative    Narrative    C. difficile GDH antigen detected and C. difficile toxin not detected by enzyme immunoassay. These results indicate the organism is present and the toxin is absent or below the limit of detection. Results must be interpreted based on clinical findings.       ----- Service Performed  and Documented by Resident or Fellow ------

## 2023-11-29 NOTE — PROGRESS NOTES
Preceptor Attestation:   Patient seen, evaluated and discussed with the resident. I personally viewed the EKG and agree with the interpretation documented by the resident. I have verified the content of the note, which accurately reflects my assessment of the patient and the plan of care.  Supervising Physician:Pennie Ordoñez MD  [facility]

## 2023-11-30 ENCOUNTER — ANCILLARY PROCEDURE (OUTPATIENT)
Dept: ULTRASOUND IMAGING | Facility: CLINIC | Age: 33
End: 2023-11-30
Attending: FAMILY MEDICINE
Payer: MEDICARE

## 2023-11-30 ENCOUNTER — TELEPHONE (OUTPATIENT)
Dept: TRANSPLANT | Facility: CLINIC | Age: 33
End: 2023-11-30

## 2023-11-30 DIAGNOSIS — D84.9 IMMUNOSUPPRESSED STATUS (H): ICD-10-CM

## 2023-11-30 DIAGNOSIS — A08.11 NOROVIRUS: ICD-10-CM

## 2023-11-30 DIAGNOSIS — R10.13 ABDOMINAL PAIN, EPIGASTRIC: ICD-10-CM

## 2023-11-30 DIAGNOSIS — Z94.0 KIDNEY REPLACED BY TRANSPLANT: Primary | ICD-10-CM

## 2023-11-30 PROCEDURE — 76705 ECHO EXAM OF ABDOMEN: CPT | Mod: TC | Performed by: RADIOLOGY

## 2023-11-30 RX ORDER — NITAZOXANIDE 500 MG/1
500 TABLET ORAL 2 TIMES DAILY WITH MEALS
Qty: 28 TABLET | Refills: 0 | Status: SHIPPED | OUTPATIENT
Start: 2023-11-30 | End: 2023-12-18

## 2023-11-30 NOTE — TELEPHONE ENCOUNTER
ISSUE  Positive norovirus    PLAN  Decrease MPA to 360mg bid x 10d, start nitazoxanide 500mg bid x14d.    OUTCOME  Spoke with Mona about the above. She will  prescription at her local pharmacy. Discussed consult to transplant surgery for cholecystectomy, message sent to Dr. Johnson to discuss possibility of moving up clinic date.

## 2023-12-06 ENCOUNTER — TELEPHONE (OUTPATIENT)
Dept: FAMILY MEDICINE | Facility: CLINIC | Age: 33
End: 2023-12-06
Payer: MEDICARE

## 2023-12-06 NOTE — TELEPHONE ENCOUNTER
Her stool sample came back positive for norovirus and was wondering if that could explain her recent abdominal pain. She also had concerns about her elevated cholesterol and was wondering if this was a concern and if her recent pregnancy could have something to do with it.

## 2023-12-06 NOTE — TELEPHONE ENCOUNTER
----- Message from Aniceto Szymanski MD sent at 12/5/2023  5:49 PM CST -----  Team - Could we please call Mona to discuss results of her recent work up for ongoing abdominal pain. Patient's ultrasound from last week shows that stones are still present in the gallbladder, but that there is not evidence of acute cholecystitis seen at that time. However I do believe she should keep her appointment scheduled w/ surgery to discuss cholecystectomy as symptoms certainly sound like biliary colic.

## 2023-12-08 NOTE — TELEPHONE ENCOUNTER
Called pt to discuss recent lipid panel results (total chol 236, ). Pt is already making considerable lifestyle changes to diet, primarily avoiding fatty foods for prevention of biliary colic. Will recheck this in 6-12 months and discuss starting statin medication at that time.

## 2023-12-15 NOTE — ANESTHESIA PROCEDURE NOTES
"Epidural catheter Procedure Note    Pre-Procedure   Staff -        Anesthesiologist:  Chester Hathaway MD       Performed By: anesthesiologist       Location: OB       Pre-Anesthestic Checklist: patient identified, IV checked, risks and benefits discussed, informed consent, monitors and equipment checked, pre-op evaluation, at physician/surgeon's request and post-op pain management  Timeout:       Correct Patient: Yes        Correct Procedure: Yes        Correct Site: Yes        Correct Position: Yes   Procedure Documentation  Procedure: epidural catheter       Patient Position: sitting       Skin prep: DuraPrep and Chloraprep       Local skin infiltrated with mL of 2% lidocaine.        Insertion Site: L3-4. (midline approach).       Technique: LORT saline        Needle Type: Touhy needle       Needle Gauge: 17.        Needle Length (Inches): 3.5        Catheter: 18 G.          Catheter threaded easily.             # of attempts: 2 and  # of redirects:  0    Assessment/Narrative         Paresthesias: No.       Test dose of 3 mL lidocaine 1.5% w/ 1:200,000 epinephrine at 18:52 CDT.         Test dose negative, 3 minutes after injection, for signs of intravascular, subdural, or intrathecal injection.       Insertion/Infusion Method: LORT saline       Aspiration negative for Heme or CSF via Epidural Catheter.       Sensory Level Left: T10.       Sensory Level Right: T10.    Medication(s) Administered   0.25% Bupivacaine PF (Epidural) - EPIDURAL   10 mL - 8/11/2023 6:55:00 PM    FOR [facility] ([facility] [place] ONLY:   Pain Team Contact information: please page the Pain Team [facility]. Search \"Pain\". During daytime hours, please page the attending first. At night please page the resident first.      " kendra

## 2023-12-18 ENCOUNTER — OFFICE VISIT (OUTPATIENT)
Dept: TRANSPLANT | Facility: CLINIC | Age: 33
End: 2023-12-18
Attending: INTERNAL MEDICINE
Payer: MEDICARE

## 2023-12-18 ENCOUNTER — LAB (OUTPATIENT)
Dept: LAB | Facility: HOSPITAL | Age: 33
End: 2023-12-18
Payer: MEDICARE

## 2023-12-18 VITALS
DIASTOLIC BLOOD PRESSURE: 66 MMHG | HEART RATE: 77 BPM | WEIGHT: 148.9 LBS | OXYGEN SATURATION: 97 % | BODY MASS INDEX: 29.08 KG/M2 | SYSTOLIC BLOOD PRESSURE: 100 MMHG | TEMPERATURE: 98 F

## 2023-12-18 DIAGNOSIS — R10.11 RUQ PAIN: Primary | ICD-10-CM

## 2023-12-18 DIAGNOSIS — Z48.298 AFTERCARE FOLLOWING ORGAN TRANSPLANT: ICD-10-CM

## 2023-12-18 DIAGNOSIS — K80.70 CALCULUS OF GALLBLADDER AND BILE DUCT WITHOUT CHOLECYSTITIS OR OBSTRUCTION: ICD-10-CM

## 2023-12-18 DIAGNOSIS — D84.9 IMMUNOSUPPRESSED STATUS (H): ICD-10-CM

## 2023-12-18 DIAGNOSIS — Z94.0 KIDNEY REPLACED BY TRANSPLANT: ICD-10-CM

## 2023-12-18 LAB
ANION GAP SERPL CALCULATED.3IONS-SCNC: 10 MMOL/L (ref 7–15)
BUN SERPL-MCNC: 29.3 MG/DL (ref 6–20)
CALCIUM SERPL-MCNC: 9.7 MG/DL (ref 8.6–10)
CHLORIDE SERPL-SCNC: 104 MMOL/L (ref 98–107)
CREAT SERPL-MCNC: 1.25 MG/DL (ref 0.51–0.95)
DEPRECATED HCO3 PLAS-SCNC: 26 MMOL/L (ref 22–29)
EGFRCR SERPLBLD CKD-EPI 2021: 59 ML/MIN/1.73M2
ERYTHROCYTE [DISTWIDTH] IN BLOOD BY AUTOMATED COUNT: 13.6 % (ref 10–15)
GLUCOSE SERPL-MCNC: 94 MG/DL (ref 70–99)
HCT VFR BLD AUTO: 37.2 % (ref 35–47)
HGB BLD-MCNC: 11.8 G/DL (ref 11.7–15.7)
MCH RBC QN AUTO: 27.7 PG (ref 26.5–33)
MCHC RBC AUTO-ENTMCNC: 31.7 G/DL (ref 31.5–36.5)
MCV RBC AUTO: 87 FL (ref 78–100)
PLATELET # BLD AUTO: 238 10E3/UL (ref 150–450)
POTASSIUM SERPL-SCNC: 4.6 MMOL/L (ref 3.4–5.3)
RBC # BLD AUTO: 4.26 10E6/UL (ref 3.8–5.2)
SODIUM SERPL-SCNC: 140 MMOL/L (ref 135–145)
TACROLIMUS BLD-MCNC: 5.6 UG/L (ref 5–15)
TME LAST DOSE: NORMAL H
TME LAST DOSE: NORMAL H
WBC # BLD AUTO: 5.5 10E3/UL (ref 4–11)

## 2023-12-18 PROCEDURE — 85027 COMPLETE CBC AUTOMATED: CPT

## 2023-12-18 PROCEDURE — 80197 ASSAY OF TACROLIMUS: CPT

## 2023-12-18 PROCEDURE — 80048 BASIC METABOLIC PNL TOTAL CA: CPT

## 2023-12-18 PROCEDURE — G0463 HOSPITAL OUTPT CLINIC VISIT: HCPCS | Performed by: TRANSPLANT SURGERY

## 2023-12-18 PROCEDURE — 36415 COLL VENOUS BLD VENIPUNCTURE: CPT

## 2023-12-18 PROCEDURE — 99203 OFFICE O/P NEW LOW 30 MIN: CPT | Performed by: TRANSPLANT SURGERY

## 2023-12-18 ASSESSMENT — PAIN SCALES - GENERAL: PAINLEVEL: NO PAIN (0)

## 2023-12-18 NOTE — NURSING NOTE
Chief Complaint   Patient presents with    Follow Up     Kidney/pancreas transplant follow-up       Vital signs:  Temp: 98  F (36.7  C) Temp src: Oral BP: 100/66 Pulse: 77     SpO2: 97 %       Weight: 67.5 kg (148 lb 14.4 oz)  Estimated body mass index is 29.08 kg/m  as calculated from the following:    Height as of 11/29/23: 1.524 m (5').    Weight as of this encounter: 67.5 kg (148 lb 14.4 oz).      Simón Roman RN on 12/18/2023 at 2:53 PM

## 2023-12-18 NOTE — PROGRESS NOTES
Transplant Surgery Progress Note    Date of Visit: 12/18/2023    Assessment: Likely biliary colic.  I discussed options of 1. Do nothing and stay on low fat diet, 2. Lap shital, 3. HIDA and possible lap shital.  Since patient had sister who had diarrhea post shital, she requests HIDA as confirmatory test prior to shital.    Plan:  HIDA ordered      Total Time: 31 min,     Dorian Johnson MD, PhD  Professor of Surgery        Transplants:  8/3/2019 (Kidney); Postoperative day:  1598  History of Present Illness:  RUQ pain since she was pregnant (x 3 months).  Worse with fatty meals.  LFTs and lipase normal. CBC normal.  Elevated ESR, + C diff. + norovirus (starting nitazoxanide)    RUQ US:    IMPRESSION:  1.  Cholelithiasis.  2.  No sonographic evidence for cholecystitis or biliary obstruction.         Review of Systems:   CONSTITUTIONAL:  No fevers or chills  EYES: negative for icterus  ENT:  negative for hearing loss, tinnitus and sore throat  RESPIRATORY:  negative for cough, sputum, dyspnea  CARDIOVASCULAR:  negative for chest pain   GASTROINTESTINAL:  negative for nausea, vomiting, diarrhea or constipation  GENITOURINARY:  negative for incontinence, dysuria, bladder emptying problems  HEME:  No easy bruising  INTEGUMENT:  negative for rash and pruritus  NEURO:  Negative for headache, seizure disorder    Immunosuppression:    Immunosuppressant Medications       Immunosuppressive Agents Disp Start End     mycophenolic acid (GENERIC EQUIVALENT) 180 MG EC tablet    180 tablet 8/18/2023     Sig - Route: Take 3 tablets (540 mg) by mouth 2 times daily - Oral    Class: E-Prescribe    Notes to Pharmacy: TXP DT 8/3/2019 (Kidney) TXP Dischg DT 8/12/2019 DX Kidney replaced by transplant Z94.0 TX Center [facility] ([place], [place])     tacrolimus (GENERIC EQUIVALENT) 1 MG capsule    180 capsule 9/21/2023     Sig - Route: Take 3 capsules (3 mg) by mouth 2 times daily - Oral    Class:  E-Prescribe     tacrolimus (GENERIC) 0.5 MG capsule     9/17/2023     Sig - Route: Take 0.5 mg by mouth 2 times daily Total dose 3mg bid - Oral    Patient not taking: Reported on 12/18/2023       Class: Historical                Prescription Medications as of 12/18/2023         Rx Number Disp Refills Start End Last Dispensed Date Next Fill Date Owning Pharmacy    acetaminophen (TYLENOL) 325 MG tablet  100 tablet 3 6/23/2023    [id number] [address] AT [facility] [address]    Sig: Take 2 tablets (650 mg) by mouth every 4 hours as needed for mild pain    Class: E-Prescribe    Route: Oral    lactase (LACTAID) 3000 UNIT tablet            Sig: Take 3,000 Units by mouth 3 times daily (with meals)    Class: Historical    Route: Oral    levothyroxine (SYNTHROID/LEVOTHROID) 25 MCG tablet  90 tablet 4 10/6/2023    [id number] [address] AT [facility] [address]    Sig: Take 1 tablet (25 mcg) by mouth daily    Class: E-Prescribe    Route: Oral    magnesium oxide (MAG-OX) 400 MG tablet  90 tablet 3 9/25/2023    [facility] [address] [place] [address] [address] AT [facility] [address]    Sig: Take 1 tablet (400 mg) by mouth every morning    Class: E-Prescribe    Route: Oral    mycophenolic acid (GENERIC EQUIVALENT) 180 MG EC tablet  180 tablet 11 8/18/2023    [id number] - [place], [place] - [address] AT [facility] [address]    Sig: Take 3 tablets (540 mg) by mouth 2 times daily    Class: E-Prescribe    Notes to Pharmacy: TXP DT 8/3/2019 (Kidney) TXP Dischg DT 8/12/2019 DX Kidney replaced by transplant Z94.0 TX Center [facility] ([place], [place])    Route: Oral    norethindrone (MICRONOR) 0.35 MG tablet  90 tablet 3 9/21/2023    [facility][id number] [facility] [place] - [address] AT [facility] [address]     Sig: Take 1 tablet (0.35 mg) by mouth daily    Class: E-Prescribe    Route: Oral    patiromer (VELTASSA) 8.4 g packet    11/27/2023        Sig: Take 8.4 g by mouth daily as needed (as directed by your transplant team, for potassium = or > 5.5)    Class: Historical    Route: Oral    simethicone (MYLICON) 125 MG chewable tablet            Sig: Take 125 mg by mouth daily    Class: Historical    Route: Oral    tacrolimus (GENERIC EQUIVALENT) 1 MG capsule  180 capsule 11 9/21/2023    [facility][id number] [facility] [address] [facility] [address] [facility] [address] [facility] [address]    Sig: Take 3 capsules (3 mg) by mouth 2 times daily    Class: E-Prescribe    Route: Oral    Lactobacillus-Inulin (CULTURELLE DIGESTIVE DAILY) CAPS    11/27/2023        Sig: Take 1 tablet by mouth daily    Class: Historical    Route: Oral    tacrolimus (GENERIC) 0.5 MG capsule    9/17/2023        Sig: Take 0.5 mg by mouth 2 times daily Total dose 3mg bid    Class: Historical    Route: Oral          Clinic-Administered Medications as of 12/18/2023         Dose Frequency Start End    iopamidol (ISOVUE-250) solution 100 mL 100 mL ONCE 9/12/2023     Route: Tube            Exam:    Temp:  [98  F (36.7  C)] 98  F (36.7  C)  Pulse:  [77] 77  BP: (100)/(66) 100/66  SpO2:  [97 %] 97 %    Temp:  [98  F (36.7  C)] 98  F (36.7  C)  Pulse:  [77] 77  BP: (100)/(66) 100/66  SpO2:  [97 %] 97 %  General Appearance: in no apparent distress.   Skin: Normal, no rashes or jaundice    Lungs: easy respirations, no audible wheezing.  Abdomen: rounded, The wound is dry and intact, without hernia. The abdomen is non-tender. The kidney graft is not tender.          Latest Ref Rng & Units 12/18/2023    10:36 AM 11/29/2023    10:15 AM 11/29/2023     9:02 AM 11/27/2023     9:36 AM 11/16/2023     9:41 AM   Transplant Immunosuppression Labs   Creat 0.51 - 0.95 mg/dL 1.25   1.15  1.18  1.16    Urea Nitrogen 6.0 - 20.0 mg/dL 29.3   25.4  24.8  25.8    WBC 4.0 - 11.0  10e3/uL 5.5  8.0   7.5  7.0        Chemistries:   Recent Labs   Lab Test 12/18/23  1036   BUN 29.3*   CR 1.25*   GFRESTIMATED 59*   GLC 94     Lab Results   Component Value Date    A1C 5.7 10/06/2023    A1C 5.5 08/04/2020     Recent Labs   Lab Test 11/29/23  0902   ALBUMIN 4.6   BILITOTAL 0.8   ALKPHOS 65   AST 17   ALT 25     Urine Studies:  Recent Labs   Lab Test 10/06/23  0941   COLOR Colorless   APPEARANCE Clear   URINEGLC Negative   URINEBILI Negative   URINEKETONE Negative   SG 1.005   UBLD 0.06 mg/dL*   URINEPH 5.5   PROTEIN Negative   NITRITE Negative   LEUKEST 500 [place]/uL*   RBCU <1   WBCU 16*     Recent Labs   Lab Test 02/09/21  0922 02/02/21  0920   UTPG 0.28* 0.38*     Hematology:   Recent Labs   Lab Test 12/18/23  1036 11/29/23  1015 11/27/23  0936   HGB 11.8 11.7 11.6*    231 226   WBC 5.5 8.0 7.5     Coags:   Recent Labs   Lab Test 08/14/23  0752 06/12/23  1003 06/09/23  0700   INR 0.91 1.06 0.99     HLA antibodies:   SA1 Hi Risk Blake   Date Value Ref Range Status   02/09/2021 Cw:17  Final     SA1 HI RISK BLAKE   Date Value Ref Range Status   10/27/2023 Cw:17  Final     SA1 Mod Risk Blake   Date Value Ref Range Status   02/09/2021 None  Final     SA1 MOD RISK BLAKE   Date Value Ref Range Status   10/27/2023 None  Final     SA2 Hi Risk Blake   Date Value Ref Range Status   02/09/2021 None  Final     SA2 HI RISK BLAKE   Date Value Ref Range Status   10/27/2023 None  Final     SA2 Mod Risk Blake   Date Value Ref Range Status   02/09/2021 DR:Tristan DP:10 14  Final     SA2 MOD RISK BLAKE   Date Value Ref Range Status   10/27/2023 DP:14  Final

## 2023-12-18 NOTE — LETTER
12/18/2023         RE: Mona Wagner  [address]  [place] [address]        Dear Colleague,    Thank you for referring your patient, Mona Wagner, to the [facility]. Please see a copy of my visit note below.    Transplant Surgery Progress Note    Date of Visit: 12/18/2023    Assessment: Likely biliary colic.  I discussed options of 1. Do nothing and stay on low fat diet, 2. Lap shital, 3. HIDA and possible lap shital.  Since patient had sister who had diarrhea post shital, she requests HIDA as confirmatory test prior to shital.    Plan:  HIDA ordered      Total Time: 31 min,     Dorian Johnson MD, PhD  Professor of Surgery        Transplants:  8/3/2019 (Kidney); Postoperative day:  1598  History of Present Illness:  RUQ pain since she was pregnant (x 3 months).  Worse with fatty meals.  LFTs and lipase normal. CBC normal.  Elevated ESR, + C diff. + norovirus (starting nitazoxanide)    RUQ US:    IMPRESSION:  1.  Cholelithiasis.  2.  No sonographic evidence for cholecystitis or biliary obstruction.         Review of Systems:   CONSTITUTIONAL:  No fevers or chills  EYES: negative for icterus  ENT:  negative for hearing loss, tinnitus and sore throat  RESPIRATORY:  negative for cough, sputum, dyspnea  CARDIOVASCULAR:  negative for chest pain   GASTROINTESTINAL:  negative for nausea, vomiting, diarrhea or constipation  GENITOURINARY:  negative for incontinence, dysuria, bladder emptying problems  HEME:  No easy bruising  INTEGUMENT:  negative for rash and pruritus  NEURO:  Negative for headache, seizure disorder    Immunosuppression:    Immunosuppressant Medications       Immunosuppressive Agents Disp Start End     mycophenolic acid (GENERIC EQUIVALENT) 180 MG EC tablet    180 tablet 8/18/2023     Sig - Route: Take 3 tablets (540 mg) by mouth 2 times daily - Oral    Class: E-Prescribe    Notes to Pharmacy: TXP DT 8/3/2019 (Kidney) TXP Dischg DT 8/12/2019 DX Kidney replaced by transplant  Z94.0 TX Center [facility] ([place], [place])     tacrolimus (GENERIC EQUIVALENT) 1 MG capsule    180 capsule 9/21/2023     Sig - Route: Take 3 capsules (3 mg) by mouth 2 times daily - Oral    Class: E-Prescribe     tacrolimus (GENERIC) 0.5 MG capsule     9/17/2023     Sig - Route: Take 0.5 mg by mouth 2 times daily Total dose 3mg bid - Oral    Patient not taking: Reported on 12/18/2023       Class: Historical                Prescription Medications as of 12/18/2023         Rx Number Disp Refills Start End Last Dispensed Date Next Fill Date Owning Pharmacy    acetaminophen (TYLENOL) 325 MG tablet  100 tablet 3 6/23/2023    [id number] - [facility] [address] AT [facility] E    Sig: Take 2 tablets (650 mg) by mouth every 4 hours as needed for mild pain    Class: E-Prescribe    Route: Oral    lactase (LACTAID) 3000 UNIT tablet            Sig: Take 3,000 Units by mouth 3 times daily (with meals)    Class: Historical    Route: Oral    levothyroxine (SYNTHROID/LEVOTHROID) 25 MCG tablet  90 tablet 4 10/6/2023    [id number] - [facility] [address] AT [facility] [address]    Sig: Take 1 tablet (25 mcg) by mouth daily    Class: E-Prescribe    Route: Oral    magnesium oxide (MAG-OX) 400 MG tablet  90 tablet 3 9/25/2023    [id number] [place], [place] - [address] AT [facility] E    Sig: Take 1 tablet (400 mg) by mouth every morning    Class: E-Prescribe    Route: Oral    mycophenolic acid (GENERIC EQUIVALENT) 180 MG EC tablet  180 tablet 11 8/18/2023    [facility] #[id number] - [place] - [address] AT [facility] OF [address]    Sig: Take 3 tablets (540 mg) by mouth 2 times daily    Class: E-Prescribe    Notes to Pharmacy: TXP DT 8/3/2019 (Kidney) TXP Dischg DT 8/12/2019 DX Kidney replaced by transplant Z94.0 TX [facility] ([place], [place])    Route: Oral    norethindrone (MICRONOR) 0.35 MG tablet  90 tablet 3 9/21/2023    [facility] #[id number] [place], [place] - [address] AT [facility] E    Sig: Take 1 tablet (0.35 mg) by mouth daily    Class: E-Prescribe    Route: Oral    patiromer (VELTASSA) 8.4 g packet    11/27/2023        Sig: Take 8.4 g by mouth daily as needed (as directed by your transplant team, for potassium = or > 5.5)    Class: Historical    Route: Oral    simethicone (MYLICON) 125 MG chewable tablet            Sig: Take 125 mg by mouth daily    Class: Historical    Route: Oral    tacrolimus (GENERIC EQUIVALENT) 1 MG capsule  180 capsule 11 9/21/2023    [facility] #[id number] - [place], [place] - [address]  AT [facility] E    Sig: Take 3 capsules (3 mg) by mouth 2 times daily    Class: E-Prescribe    Route: Oral    Lactobacillus-Inulin (CULTURELLE DIGESTIVE DAILY) CAPS    11/27/2023        Sig: Take 1 tablet by mouth daily    Class: Historical    Route: Oral    tacrolimus (GENERIC) 0.5 MG capsule    9/17/2023        Sig: Take 0.5 mg by mouth 2 times daily Total dose 3mg bid    Class: Historical    Route: Oral          Clinic-Administered Medications as of 12/18/2023         Dose Frequency Start End    iopamidol (ISOVUE-250) solution 100 mL 100 mL ONCE 9/12/2023     Route: Tube            Exam:    Temp:  [98  F (36.7  C)] 98  F (36.7  C)  Pulse:  [77] 77  BP: (100)/(66) 100/66  SpO2:  [97 %] 97 %    Temp:  [98  F (36.7  C)] 98  F (36.7  C)  Pulse:  [77] 77  BP: (100)/(66) 100/66  SpO2:  [97 %] 97 %  General Appearance: in no apparent distress.   Skin: Normal, no rashes or jaundice    Lungs: easy respirations, no audible wheezing.  Abdomen: rounded, The wound is dry and intact, without hernia. The abdomen is non-tender. The kidney graft is not tender.          Latest Ref Rng & Units 12/18/2023    10:36 AM 11/29/2023     10:15 AM 11/29/2023     9:02 AM 11/27/2023     9:36 AM 11/16/2023     9:41 AM   Transplant Immunosuppression Labs   Creat 0.51 - 0.95 mg/dL 1.25   1.15  1.18  1.16    Urea Nitrogen 6.0 - 20.0 mg/dL 29.3   25.4  24.8  25.8    WBC 4.0 - 11.0 10e3/uL 5.5  8.0   7.5  7.0        Chemistries:   Recent Labs   Lab Test 12/18/23  1036   BUN 29.3*   CR 1.25*   GFRESTIMATED 59*   GLC 94     Lab Results   Component Value Date    A1C 5.7 10/06/2023    A1C 5.5 08/04/2020     Recent Labs   Lab Test 11/29/23  0902   ALBUMIN 4.6   BILITOTAL 0.8   ALKPHOS 65   AST 17   ALT 25     Urine Studies:  Recent Labs   Lab Test 10/06/23  0941   COLOR Colorless   APPEARANCE Clear   URINEGLC Negative   URINEBILI Negative   URINEKETONE Negative   SG 1.005   UBLD 0.06 mg/dL*   URINEPH 5.5   PROTEIN Negative   NITRITE Negative   LEUKEST 500 [place]/uL*   RBCU <1   WBCU 16*     Recent Labs   Lab Test 02/09/21  0922 02/02/21  0920   UTPG 0.28* 0.38*     Hematology:   Recent Labs   Lab Test 12/18/23  1036 11/29/23  1015 11/27/23  0936   HGB 11.8 11.7 11.6*    231 226   WBC 5.5 8.0 7.5     Coags:   Recent Labs   Lab Test 08/14/23  0752 06/12/23  1003 06/09/23  0700   INR 0.91 1.06 0.99     HLA antibodies:   SA1 Hi Risk Blake   Date Value Ref Range Status   02/09/2021 Cw:17  Final     SA1 HI RISK BLAKE   Date Value Ref Range Status   10/27/2023 Cw:17  Final     SA1 Mod Risk Blake   Date Value Ref Range Status   02/09/2021 None  Final     SA1 MOD RISK BLAKE   Date Value Ref Range Status   10/27/2023 None  Final     SA2 Hi Risk Blake   Date Value Ref Range Status   02/09/2021 None  Final     SA2 HI RISK BLAKE   Date Value Ref Range Status   10/27/2023 None  Final     SA2 Mod Risk Blake   Date Value Ref Range Status   02/09/2021 DR:7 DP:10 14  Final     SA2 MOD RISK BLAKE   Date Value Ref Range Status   10/27/2023 DP:14  Final         Dorian Johnson MD

## 2023-12-22 ENCOUNTER — HOSPITAL ENCOUNTER (OUTPATIENT)
Dept: NUCLEAR MEDICINE | Facility: HOSPITAL | Age: 33
Discharge: HOME | End: 2023-12-22
Attending: TRANSPLANT SURGERY | Admitting: TRANSPLANT SURGERY
Payer: MEDICARE

## 2023-12-22 PROCEDURE — G1010 CDSM STANSON: HCPCS

## 2023-12-22 PROCEDURE — 250N000011 HC RX IP 250 OP 636: Performed by: TRANSPLANT SURGERY

## 2023-12-22 PROCEDURE — 343N000001 HC RX 343: Performed by: TRANSPLANT SURGERY

## 2023-12-22 PROCEDURE — A9537 TC99M MEBROFENIN: HCPCS | Performed by: TRANSPLANT SURGERY

## 2023-12-22 PROCEDURE — 78227 HEPATOBIL SYST IMAGE W/DRUG: CPT | Mod: MG

## 2023-12-22 RX ORDER — KIT FOR THE PREPARATION OF TECHNETIUM TC 99M MEBROFENIN 45 MG/10ML
3-10 INJECTION, POWDER, LYOPHILIZED, FOR SOLUTION INTRAVENOUS ONCE
Status: COMPLETED | OUTPATIENT
Start: 2023-12-22 | End: 2023-12-22

## 2023-12-22 RX ADMIN — SINCALIDE 1.4 MCG: 5 INJECTION, POWDER, LYOPHILIZED, FOR SOLUTION INTRAVENOUS at 09:53

## 2023-12-22 RX ADMIN — MEBROFENIN 7.8 MILLICURIE: 45 INJECTION, POWDER, LYOPHILIZED, FOR SOLUTION INTRAVENOUS at 08:35

## 2024-01-04 ENCOUNTER — MYC MEDICAL ADVICE (OUTPATIENT)
Dept: OTHER | Age: 34
End: 2024-01-04

## 2024-01-04 DIAGNOSIS — Z94.0 KIDNEY REPLACED BY TRANSPLANT: ICD-10-CM

## 2024-01-04 DIAGNOSIS — D84.9 IMMUNOSUPPRESSED STATUS (H): Primary | ICD-10-CM

## 2024-01-04 DIAGNOSIS — Z48.298 AFTERCARE FOLLOWING ORGAN TRANSPLANT: ICD-10-CM

## 2024-01-04 DIAGNOSIS — G47.33 OBSTRUCTIVE SLEEP APNEA (ADULT) (PEDIATRIC): Primary | ICD-10-CM

## 2024-01-15 DIAGNOSIS — Z94.0 KIDNEY REPLACED BY TRANSPLANT: Primary | ICD-10-CM

## 2024-01-15 RX ORDER — MYCOPHENOLIC ACID 180 MG/1
540 TABLET, DELAYED RELEASE ORAL 2 TIMES DAILY
Qty: 180 TABLET | Refills: 11 | Status: SHIPPED | OUTPATIENT
Start: 2024-01-15 | End: 2024-01-24

## 2024-01-23 ENCOUNTER — TELEPHONE (OUTPATIENT)
Dept: TRANSPLANT | Facility: CLINIC | Age: 34
End: 2024-01-23
Payer: MEDICARE

## 2024-01-23 DIAGNOSIS — Z94.0 KIDNEY REPLACED BY TRANSPLANT: ICD-10-CM

## 2024-01-23 NOTE — TELEPHONE ENCOUNTER
PA Initiation    Medication: MYCOPHENOLIC ACID (GENERIC EQUIV) 180 MG PO TBEC  Insurance Company: [employer] - Phone [phone] Fax [phone]  Pharmacy Filling the Rx: [email or web address] #[id number] [place], [place] - [facility] AT [facility] [address]  Filling Pharmacy Phone:    Filling Pharmacy Fax:    Start Date: 1/23/2024

## 2024-01-24 ENCOUNTER — TELEPHONE (OUTPATIENT)
Dept: TRANSPLANT | Facility: CLINIC | Age: 34
End: 2024-01-24
Payer: MEDICARE

## 2024-01-24 RX ORDER — MYCOPHENOLIC ACID 180 MG/1
540 TABLET, DELAYED RELEASE ORAL 2 TIMES DAILY
Qty: 180 TABLET | Refills: 11 | Status: SHIPPED | OUTPATIENT
Start: 2024-01-24 | End: 2024-08-28

## 2024-01-24 NOTE — TELEPHONE ENCOUNTER
Prior Authorization Approval    Medication: MYCOPHENOLIC ACID (GENERIC EQUIV) 180 MG PO TBEC  Authorization Effective Date: 1/12/2024  Authorization Expiration Date:  until further notice  Approved Dose/Quantity: ud  Reference #:     Insurance Company: [employer] - [employer] [phone] Fax [phone]  CoPay Card Available: No    Financial Assistance Needed: n/a  Which Pharmacy is filling the prescription: [facility] #[id number] [place] [id number] [facility]  Pharmacy Notified: yes  Patient Notified: yes

## 2024-01-24 NOTE — TELEPHONE ENCOUNTER
PRIOR AUTHORIZATION DENIED    Medication: TACROLIMUS (GENERIC) 1 MG PO CAPS  Insurance Company: [employer] - Phone [phone] Fax [phone]  Denial Date: 1/24/2024  Denial Reason(s):   Patient had medicare at time of transplant  Tacrolimus should be billed through medicare part B not D  Patient will see if allyssa can bill MA secondary if not she will want to fill with [place] [facility] pharmacy       Appeal Information:           Patient Notified: yes

## 2024-01-24 NOTE — TELEPHONE ENCOUNTER
PA Initiation    Medication: TACROLIMUS (GENERIC) 1 MG PO CAPS  Insurance Company: [employer] - Phone [phone] Fax [phone]  Pharmacy Filling the Rx: [email or web address] #[id number] - [place] [id number] AT [facility] [address]  Filling Pharmacy Phone:    Filling Pharmacy Fax:    Start Date: 1/24/2024

## 2024-02-21 ENCOUNTER — TELEPHONE (OUTPATIENT)
Dept: ENDOCRINOLOGY | Facility: CLINIC | Age: 34
End: 2024-02-21
Payer: MEDICARE

## 2024-02-21 NOTE — TELEPHONE ENCOUNTER
Patient call:     Appointment type: return endocrine   Provider: Chow   Return date: 6/18   Speciality phone number: [phone]    Additional appointment(s) needed:   Additional notes: Spoke to pt and re-denia appt on 6/7 to 6/18     Rosanna Brewster on 2/21/2024 at 1:52 PM

## 2024-02-22 ENCOUNTER — TELEPHONE (OUTPATIENT)
Dept: TRANSPLANT | Facility: CLINIC | Age: 34
End: 2024-02-22

## 2024-02-22 ENCOUNTER — LAB (OUTPATIENT)
Dept: LAB | Facility: HOSPITAL | Age: 34
End: 2024-02-22
Payer: MEDICARE

## 2024-02-22 DIAGNOSIS — D84.9 IMMUNOSUPPRESSED STATUS (H): ICD-10-CM

## 2024-02-22 DIAGNOSIS — Z48.298 AFTERCARE FOLLOWING ORGAN TRANSPLANT: ICD-10-CM

## 2024-02-22 DIAGNOSIS — Z94.0 KIDNEY TRANSPLANT RECIPIENT: Primary | ICD-10-CM

## 2024-02-22 DIAGNOSIS — E03.9 ACQUIRED HYPOTHYROIDISM: ICD-10-CM

## 2024-02-22 DIAGNOSIS — Z94.0 KIDNEY REPLACED BY TRANSPLANT: ICD-10-CM

## 2024-02-22 DIAGNOSIS — D84.9 IMMUNOSUPPRESSION (H): ICD-10-CM

## 2024-02-22 DIAGNOSIS — R73.01 IMPAIRED FASTING GLUCOSE: ICD-10-CM

## 2024-02-22 LAB
ANION GAP SERPL CALCULATED.3IONS-SCNC: 8 MMOL/L (ref 7–15)
BUN SERPL-MCNC: 30.4 MG/DL (ref 6–20)
CALCIUM SERPL-MCNC: 9.6 MG/DL (ref 8.6–10)
CHLORIDE SERPL-SCNC: 103 MMOL/L (ref 98–107)
CREAT SERPL-MCNC: 1.32 MG/DL (ref 0.51–0.95)
DEPRECATED HCO3 PLAS-SCNC: 28 MMOL/L (ref 22–29)
EGFRCR SERPLBLD CKD-EPI 2021: 55 ML/MIN/1.73M2
ERYTHROCYTE [DISTWIDTH] IN BLOOD BY AUTOMATED COUNT: 13 % (ref 10–15)
GLUCOSE SERPL-MCNC: 103 MG/DL (ref 70–99)
HBA1C MFR BLD: 5.8 %
HCT VFR BLD AUTO: 37.7 % (ref 35–47)
HGB BLD-MCNC: 11.9 G/DL (ref 11.7–15.7)
MCH RBC QN AUTO: 27.4 PG (ref 26.5–33)
MCHC RBC AUTO-ENTMCNC: 31.6 G/DL (ref 31.5–36.5)
MCV RBC AUTO: 87 FL (ref 78–100)
PLATELET # BLD AUTO: 226 10E3/UL (ref 150–450)
POTASSIUM SERPL-SCNC: 4.5 MMOL/L (ref 3.4–5.3)
PROLACTIN SERPL 3RD IS-MCNC: 16 NG/ML (ref 5–23)
RBC # BLD AUTO: 4.34 10E6/UL (ref 3.8–5.2)
SODIUM SERPL-SCNC: 139 MMOL/L (ref 135–145)
T3FREE SERPL-MCNC: 2.8 PG/ML (ref 2–4.4)
T4 FREE SERPL-MCNC: 1.28 NG/DL (ref 0.9–1.7)
TACROLIMUS BLD-MCNC: 5.7 UG/L (ref 5–15)
TME LAST DOSE: NORMAL H
TME LAST DOSE: NORMAL H
TSH SERPL DL<=0.005 MIU/L-ACNC: 2.78 UIU/ML (ref 0.3–4.2)
WBC # BLD AUTO: 6.6 10E3/UL (ref 4–11)

## 2024-02-22 PROCEDURE — 36415 COLL VENOUS BLD VENIPUNCTURE: CPT

## 2024-02-22 NOTE — TELEPHONE ENCOUNTER
ISSUE  Creatinine elevated above baseline.    PLAN  Call patient and assess hydration status, recent illness, changes to medications, recent blood pressures, pain over graft site, UTI symptoms. Repeat labs in 1 week with U/A and U/S to rule out hydro per Dr. Mcintyre.    OUTCOME  Spoke with patient. She has been working the last few days and reports hydrating less than normal. She is agreeable to repeat labs next week. Orders updated.

## 2024-02-28 ENCOUNTER — TELEPHONE (OUTPATIENT)
Dept: ENDOCRINOLOGY | Facility: CLINIC | Age: 34
End: 2024-02-28
Payer: MEDICARE

## 2024-02-28 NOTE — TELEPHONE ENCOUNTER
"Called pt - discussed with pt.     -  Dear Mona    Here are your thyroid and prolactin labs which are all normal.  This is great news.  If anything, your hemoglobin A1c, the 3-month measure of your blood sugar, slightly higher at 5.8.  This is a hint of \"prediabetes\" and we recommend that you be really healthy with your diet, eat less carbs, and try to exercise at least 1/2 hour/day.    If you have any questions, please feel free to contact my nurse at [phone] select option #3 for triage nurse  or  option #1 for scheduling related questions.    Regards    Katrin Lopez MD     Lab on 02/22/2024   Component Date Value Ref Range Status     TSH 02/22/2024 2.78  0.30 - 4.20 uIU/mL Final     T3 Free 02/22/2024 2.8  2.0 - 4.4 pg/mL Final     Free T4 02/22/2024 1.28  0.90 - 1.70 ng/dL Final     Hemoglobin A1C 02/22/2024 5.8 (H)  <5.7 % Final    Normal <5.7%   Prediabetes 5.7-6.4%    Diabetes 6.5% or higher     Note: Adopted from ADA consensus guidelines.     Prolactin 02/22/2024 16  5 - 23 ng/mL Final     Sodium 02/22/2024 139  135 - 145 mmol/L Final    Reference intervals for this test were updated on 09/26/2023 to more accurately reflect our healthy population. There may be differences in the flagging of prior results with similar values performed with this method. Interpretation of those prior results can be made in the context of the updated reference intervals.      Potassium 02/22/2024 4.5  3.4 - 5.3 mmol/L Final     Chloride 02/22/2024 103  98 - 107 mmol/L Final     Carbon Dioxide (CO2) 02/22/2024 28  22 - 29 mmol/L Final     Anion Gap 02/22/2024 8  7 - 15 mmol/L Final     Urea Nitrogen 02/22/2024 30.4 (H)  6.0 - 20.0 mg/dL Final     Creatinine 02/22/2024 1.32 (H)  0.51 - 0.95 mg/dL Final     GFR Estimate 02/22/2024 55 (L)  >60 mL/min/1.73m2 Final     Calcium 02/22/2024 9.6  8.6 - 10.0 mg/dL Final     Glucose 02/22/2024 103 (H)  70 - 99 mg/dL Final     WBC Count 02/22/2024 6.6  4.0 - 11.0 10e3/uL Final     RBC " Count 02/22/2024 4.34  3.80 - 5.20 10e6/uL Final     Hemoglobin 02/22/2024 11.9  11.7 - 15.7 g/dL Final     Hematocrit 02/22/2024 37.7  35.0 - 47.0 % Final     MCV 02/22/2024 87  78 - 100 fL Final     MCH 02/22/2024 27.4  26.5 - 33.0 pg Final     MCHC 02/22/2024 31.6  31.5 - 36.5 g/dL Final     RDW 02/22/2024 13.0  10.0 - 15.0 % Final     Platelet Count 02/22/2024 226  150 - 450 10e3/uL Final     Tacrolimus by Tandem Mass Spectrom* 02/22/2024 5.7  5.0 - 15.0 ug/L Final    Comment: Tacrolimus Reference Range (ug/L):    Kidney Transplant:  Pediatric  0-3 months post transplant: 10-12  3-6 months post transplant: 8-10  6-12 months post transplant: 6-8  >12 months post transplant: 4-7    Adult  0-6 months post transplant: 8-10  6-12 months post transplant: 6-8  >12 months post transplant: 4-6  >5 years post transplant: 3-5    Heart Transplant:  Pediatric  0-12 months post transplant: 10-15  >12 months post transplant: 5-10    Adult  0-3 months post transplant: 10-15  3-6 months post transplant: 8-12  6-12 months post transplant: 6-12  >12 months post transplant: 6-10    Lung Transplant:  0-12 months post transplant: 10-15  >12 months post transplant: 8-12    Liver Transplant:  Pediatric  0-3 months post transplant: 10-15  3-6 months post transplant: 8-10  6 months-5 years post transplant: 6-8   >5 years post transplant: 1-3    Adult  0-3 months post transplant: 10-12  3-6 months post transplant: 8-10  >6 months post transplant: 6-8    Pancreas Transplant:  0-6                            months post transplant: 8-10  >6 months post transplant: 5-8     Tacrolimus Last Dose Date 02/22/2024 2/21/2024   Final     Tacrolimus Last Dose Time 02/22/2024  9:40 PM   Final

## 2024-02-29 ENCOUNTER — LAB (OUTPATIENT)
Dept: LAB | Facility: HOSPITAL | Age: 34
End: 2024-02-29
Payer: MEDICARE

## 2024-02-29 DIAGNOSIS — Z94.0 KIDNEY TRANSPLANT RECIPIENT: ICD-10-CM

## 2024-02-29 DIAGNOSIS — D84.9 IMMUNOSUPPRESSED STATUS (H): ICD-10-CM

## 2024-02-29 DIAGNOSIS — Z48.298 AFTERCARE FOLLOWING ORGAN TRANSPLANT: ICD-10-CM

## 2024-02-29 DIAGNOSIS — Z94.0 KIDNEY REPLACED BY TRANSPLANT: ICD-10-CM

## 2024-02-29 DIAGNOSIS — D84.9 IMMUNOSUPPRESSION (H): ICD-10-CM

## 2024-02-29 LAB
ALBUMIN UR-MCNC: NEGATIVE MG/DL
ANION GAP SERPL CALCULATED.3IONS-SCNC: 10 MMOL/L (ref 7–15)
APPEARANCE UR: CLEAR
BILIRUB UR QL STRIP: NEGATIVE
BUN SERPL-MCNC: 24.3 MG/DL (ref 6–20)
CALCIUM SERPL-MCNC: 9.6 MG/DL (ref 8.6–10)
CHLORIDE SERPL-SCNC: 102 MMOL/L (ref 98–107)
COLOR UR AUTO: COLORLESS
CREAT SERPL-MCNC: 1.08 MG/DL (ref 0.51–0.95)
DEPRECATED HCO3 PLAS-SCNC: 26 MMOL/L (ref 22–29)
EGFRCR SERPLBLD CKD-EPI 2021: 70 ML/MIN/1.73M2
ERYTHROCYTE [DISTWIDTH] IN BLOOD BY AUTOMATED COUNT: 13.1 % (ref 10–15)
GLUCOSE SERPL-MCNC: 97 MG/DL (ref 70–99)
GLUCOSE UR STRIP-MCNC: NEGATIVE MG/DL
HCT VFR BLD AUTO: 37.2 % (ref 35–47)
HGB BLD-MCNC: 11.4 G/DL (ref 11.7–15.7)
HGB UR QL STRIP: NEGATIVE
KETONES UR STRIP-MCNC: NEGATIVE MG/DL
LEUKOCYTE ESTERASE UR QL STRIP: NEGATIVE
MCH RBC QN AUTO: 27 PG (ref 26.5–33)
MCHC RBC AUTO-ENTMCNC: 30.6 G/DL (ref 31.5–36.5)
MCV RBC AUTO: 88 FL (ref 78–100)
NITRATE UR QL: NEGATIVE
PH UR STRIP: 6 [PH] (ref 5–7)
PLATELET # BLD AUTO: 226 10E3/UL (ref 150–450)
POTASSIUM SERPL-SCNC: 4.3 MMOL/L (ref 3.4–5.3)
RBC # BLD AUTO: 4.23 10E6/UL (ref 3.8–5.2)
RBC URINE: 1 /HPF
SODIUM SERPL-SCNC: 138 MMOL/L (ref 135–145)
SP GR UR STRIP: 1.01 (ref 1–1.03)
SQUAMOUS EPITHELIAL: 1 /HPF
TACROLIMUS BLD-MCNC: 5.8 UG/L (ref 5–15)
TME LAST DOSE: NORMAL H
TME LAST DOSE: NORMAL H
UROBILINOGEN UR STRIP-MCNC: <2 MG/DL
WBC # BLD AUTO: 6.2 10E3/UL (ref 4–11)
WBC URINE: 1 /HPF

## 2024-02-29 PROCEDURE — 82374 ASSAY BLOOD CARBON DIOXIDE: CPT

## 2024-02-29 PROCEDURE — 82565 ASSAY OF CREATININE: CPT

## 2024-02-29 PROCEDURE — 81001 URINALYSIS AUTO W/SCOPE: CPT

## 2024-02-29 PROCEDURE — 36415 COLL VENOUS BLD VENIPUNCTURE: CPT

## 2024-02-29 PROCEDURE — 85027 COMPLETE CBC AUTOMATED: CPT

## 2024-03-01 ENCOUNTER — ANCILLARY PROCEDURE (OUTPATIENT)
Dept: ULTRASOUND IMAGING | Facility: CLINIC | Age: 34
End: 2024-03-01
Attending: INTERNAL MEDICINE
Payer: MEDICARE

## 2024-03-01 DIAGNOSIS — D84.9 IMMUNOSUPPRESSION (H): ICD-10-CM

## 2024-03-01 DIAGNOSIS — Z94.0 KIDNEY TRANSPLANT RECIPIENT: ICD-10-CM

## 2024-03-01 PROCEDURE — 76775 US EXAM ABDO BACK WALL LIM: CPT | Mod: GC | Performed by: RADIOLOGY

## 2024-03-04 ENCOUNTER — OFFICE VISIT (OUTPATIENT)
Dept: DERMATOLOGY | Facility: CLINIC | Age: 34
End: 2024-03-04
Payer: MEDICARE

## 2024-03-04 DIAGNOSIS — Z12.83 ENCOUNTER FOR SCREENING FOR MALIGNANT NEOPLASM OF SKIN: Primary | ICD-10-CM

## 2024-03-04 DIAGNOSIS — L21.9 DERMATITIS, SEBORRHEIC: ICD-10-CM

## 2024-03-04 DIAGNOSIS — L72.0 EIC (EPIDERMAL INCLUSION CYST): ICD-10-CM

## 2024-03-04 DIAGNOSIS — D22.9 MULTIPLE NEVI: ICD-10-CM

## 2024-03-04 PROCEDURE — 99213 OFFICE O/P EST LOW 20 MIN: CPT | Performed by: PHYSICIAN ASSISTANT

## 2024-03-04 RX ORDER — KETOCONAZOLE 20 MG/ML
SHAMPOO TOPICAL
Qty: 100 ML | Refills: 11 | Status: SHIPPED | OUTPATIENT
Start: 2024-03-04

## 2024-03-04 ASSESSMENT — PAIN SCALES - GENERAL: PAINLEVEL: NO PAIN (0)

## 2024-03-04 NOTE — NURSING NOTE
Dermatology Rooming Note    Mona Wagner's goals for this visit include:   Chief Complaint   Patient presents with    Skin Check     Mona is here today for a skin check and is concerned about a new mole on the right brow. Lump under the right axilla.     Braxton SANTORO, CMA

## 2024-03-04 NOTE — PROGRESS NOTES
[facility] Dermatology Note  Encounter Date: Mar 4, 2024  Office Visit     Reviewed patients past medical history and pertinent chart review prior to patients visit today.     Dermatology Problem List:  1. Keratosis Pilaris  - Past tx: Amlactin  2. Renal transplant for IgA nephropathy in 8/2019 c/b hydronephrosis.   - currently: myfortic and tacrolimus  - annual skin check. Last: 3/4/2024  3. Milial cyst in left labia majora  4. Non-scarring hair loss  - Rogaine deferred  5. Seborrheic dermatitis  - ketoconazole 2% shampoo  6. Right axillary nodule  - Ultrasound 07/06/2022 demonstrated lymph node with a prominent fatty hilum   - Continue to monitor   ____________________________________________    Assessment & Plan:     # Epidermal inclusion cyst, right eyebrow  - We reviewed the etiology of the lesion. We reviewed the option to treat with excision. The patient preferred to continue to monitor the lesion. I recommend follow up with any concerning changes.     # Seborrheic dermatitis, scalp   Discussed that this is a recurring condition for most people and will need some maintenance even after rash has resolved. Plan as follows:  - ketoconazole 2% shampoo three times weekly. Advised to lather in the shower, waiting 5-10 minutes before rinsing.     # Chronic immunosuppression  # Multiple nevi, trunk and extremities  # Solar lentigines  - No concerning features on dermoscopy. We discussed the importance of self exams at home.   - ABCDEs: Counseled ABCDEs of melanoma: Asymmetry, Border (irregularity), Color (not uniform, changes in color), Diameter (greater than 6 mm which is about the size of a pencil eraser), and Evolving (any changes in preexisting moles).  - Sun protection: Counseled SPF 30+ sunscreen, UPF clothing, sun avoidance, tanning bed avoidance.     Follow-up:  Annual for follow up full body skin exam, as needed for new or changing lesions or new concerns    All risks, benefits and  alternatives were discussed with patient.  Patient is in agreement and understands the assessment and plan.  All questions were answered.  BELTRAN [facility] Dermatology    ____________________________________________    CC: Skin Check (Mona is here today for a skin check and is concerned about a new mole on the right brow. Lump under the right axilla.)    HPI:  Ms. Mona Wagner is a(n) 33 year old female who presents today as a return patient for a full body skin cancer screening. The patient has a history of renal transplant, chronically immunosuppressed with mycophenolate and tacrolimus. The patient denies a personal history of skin cancer. The patient requests evaluation of a lesion on the right eyebrow. The lesion has not grown or changed over time. No pain, itching, or bleeding at the site. Additionally, she notes a longstanding nodule involving the right axilla. She denies any growth or symptoms from the lesion. No other specific cutaneous concerns today. The patient reports trying to be diligent with photoprotection.      Physical Exam:  Vitals: There were no vitals taken for this visit.  SKIN: Total skin excluding the genitalia areas was performed. The exam included the scalp, face, neck, bilateral arms, chest, back, abdomen, bilateral legs, digits, mons pubis, buttocks, and nails.   - Dewey II.  - The right eyebrow demonstrates a 0.5 x 0.5 cm well demarcated, mobile subcutaneous nodule with punctum, consistent with an epidermal inclusion cyst.   - Greasy yellow scale with background erythema involving the occipital scalp.   - Multiple tan/brown macules and papules scattered throughout exam, consistent with benign nevi. No concerning features on dermoscopy.   - Scattered tan, homogenous macules scattered on sun exposed skin, consistent with solar lentigines.     Medications:  Current Outpatient Medications   Medication    acetaminophen (TYLENOL) 325 MG tablet    lactase (LACTAID)  3000 UNIT tablet    levothyroxine (SYNTHROID/LEVOTHROID) 25 MCG tablet    magnesium oxide (MAG-OX) 400 MG tablet    mycophenolic acid (GENERIC EQUIVALENT) 180 MG EC tablet    norethindrone (MICRONOR) 0.35 MG tablet    patiromer (VELTASSA) 8.4 g packet    simethicone (MYLICON) 125 MG chewable tablet    tacrolimus (GENERIC EQUIVALENT) 1 MG capsule    Lactobacillus-Inulin (CULTURELLE DIGESTIVE DAILY) CAPS    tacrolimus (GENERIC) 0.5 MG capsule     No current facility-administered medications for this visit.     Facility-Administered Medications Ordered in Other Visits   Medication    iopamidol (ISOVUE-250) solution 100 mL      Past Medical History:   Patient Active Problem List   Diagnosis    Acquired hypothyroidism    Hypomagnesemia    Aftercare following organ transplant    Immunosuppressed status (H24)    Kidney replaced by transplant    Vitamin D deficiency    CKD (chronic kidney disease) stage 2, GFR 60-89 ml/min    Diarrhea    Bicornate uterus    Hydronephrosis of kidney transplant    Ureteral stenosis    Anemia in stage 2 chronic kidney disease    Calculus of gallbladder and bile duct without cholecystitis or obstruction     Past Medical History:   Diagnosis Date    Anemia in chronic kidney disease     Bacteremia 07/05/2023    Clostridium difficile colitis 8/25/2023    Difficult intubation     see 4/28/23 anesthesia notes    History of bacterial endocarditis     History of blood transfusion     History of DVT (deep vein thrombosis) 2014    History of seizure 2014    History of subarachnoid hemorrhage     Hydronephrosis     Hypothyroidism     Kidney transplanted 08/03/2019    DCD DDKT. Induction with thymo 6mg/kg.    Orthostatic hypotension     FATOUMATA (obstructive sleep apnea)     Pyelonephritis of transplanted kidney 01/23/2020    Secondary renal hyperparathyroidism (H24)     Urinary tract infection        CC Referred Lucas, MD  No address on file on close of this encounter.

## 2024-03-04 NOTE — PATIENT INSTRUCTIONS
Patient Education        Proper skin care from [facility]:     -Eliminate harsh soaps as they strip the natural oils from the skin, often resulting in dry itchy skin ( i.e. Dial, Zest, [place])  -Use mild soaps such as Cetaphil or Dove Sensitive Skin in the shower. You do not need to use soap on arms, legs, and trunk every time you shower unless visibly soiled.   -Avoid hot or cold showers.  -After showering, lightly dry off and apply moisturizing within 2-3 minutes. This will help trap moisture in the skin.   -Aggressive use of a moisturizer at least 1-2 times a day to the entire body (including -Vanicream, Cetaphil, Aquaphor or Cerave) and moisturize hands after every washing.  -We recommend using moisturizers that come in a tub that needs to be scooped out, not a pump. This has more of an oil base. It will hold moisture in your skin much better than a water base moisturizer. The above recommended are non-pore clogging.        Wear a sunscreen with at least SPF 30 on your face, ears, neck and V of the chest daily. Wear sunscreen on other areas of the body if those areas are exposed to the sun throughout the day. Sunscreens can contain physical and/or chemical blockers. Physical blockers are less likely to clog pores, these include zinc oxide and titanium dioxide. Reapply every two hour and after swimming.      Sunscreen examples: https://www.ewg.org/sunscreen/     UV radiation  UVA radiation remains constant throughout the day and throughout the year. It is a longer wavelength than UVB and therefore penetrates deeper into the skin leading to immediate and delayed tanning, photoaging, and skin cancer. 70-80% of UVA and UVB radiation occurs between the hours of 10am-2pm.  UVB radiation  UVB radiation causes the most harmful effects and is more significant during the summer months. However, snow and ice can reflect UVB radiation leading to skin damage during the winter months as well. UVB radiation is  responsible for tanning, burning, inflammation, delayed erythema (pinkness), pigmentation (brown spots), and skin cancer.      I recommend self monthly full body exams and yearly full body exams with a dermatology provider. If you develop a new or changing lesion please follow up for examination. Most skin cancers are pink and scaly or pink and pearly. However, we do see blue/brown/black skin cancers.  Consider the ABCDEs of melanoma when giving yourself your monthly full body exam ( don't forget the groin, buttocks, feet, toes, etc). A-asymmetry, B-borders, C-color, D-diameter, E-elevation or evolving. If you see any of these changes please follow up in clinic. If you cannot see your back I recommend purchasing a hand held mirror to use with a larger wall mirror.       Checking for Skin Cancer  You can find cancer early by checking your skin each month. There are 3 kinds of skin cancer. They are melanoma, basal cell carcinoma, and squamous cell carcinoma. Doing monthly skin checks is the best way to find new marks or skin changes. Follow the instructions below for checking your skin.   The ABCDEs of checking moles for melanoma   Check your moles or growths for signs of melanoma using ABCDE:   Asymmetry: the sides of the mole or growth don t match  Border: the edges are ragged, notched, or blurred  Color: the color within the mole or growth varies  Diameter: the mole or growth is larger than 6 mm (size of a pencil eraser)  Evolving: the size, shape, or color of the mole or growth is changing (evolving is not shown in the images below)    Checking for other types of skin cancer  Basal cell carcinoma or squamous cell carcinoma have symptoms such as:      A spot or mole that looks different from all other marks on your skin  Changes in how an area feels, such as itching, tenderness, or pain  Changes in the skin's surface, such as oozing, bleeding, or scaliness  A sore that does not heal  New swelling or redness beyond  the border of a mole     Who s at risk?  Anyone can get skin cancer. But you are at greater risk if you have:   Fair skin, light-colored hair, or light-colored eyes  Many moles or abnormal moles on your skin  A history of sunburns from sunlight or tanning beds  A family history of skin cancer  A history of exposure to radiation or chemicals  A weakened immune system  If you have had skin cancer in the past, you are at risk for recurring skin cancer.   How to check your skin  Do your monthly skin checkups in front of a full-length mirror. Check all parts of your body, including your:   Head (ears, face, neck, and scalp)  Torso (front, back, and sides)  Arms (tops, undersides, upper, and lower armpits)  Hands (palms, backs, and fingers, including under the nails)  Buttocks and genitals  Legs (front, back, and sides)  Feet (tops, soles, toes, including under the nails, and between toes)  If you have a lot of moles, take digital photos of them each month. Make sure to take photos both up close and from a distance. These can help you see if any moles change over time.   Most skin changes are not cancer. But if you see any changes in your skin, call your doctor right away. Only he or she can diagnose a problem. If you have skin cancer, seeing your doctor can be the first step toward getting the treatment that could save your life.   Scientific Revenue last reviewed this educational content on 4/1/2019 2000-2020 [employer]. [address] [place] [address], [place] [address]. All rights reserved. This information is not intended as a substitute for professional medical care. Always follow your healthcare professional's instructions.        When should I call my doctor?  If you are worsening or not improving, please, contact us or seek urgent care as noted below.      Who should I call with questions (adults)?  [facility] (adult and pediatric): [phone]  [facility]  [place] (adult): [phone]  [facility] ([place], [place], [place] and [place]) [phone]  For urgent needs outside of business hours call the [facility] at [phone] and ask for the dermatology resident on call to be paged  If this is a medical emergency and you are unable to reach an ER, Call [phone]        If you need a prescription refill, please contact your pharmacy. Refills are approved or denied by our Physicians during normal business hours, Monday through Fridays  Per office policy, refills will not be granted if you have not been seen within the past year (or sooner depending on your child's condition)

## 2024-03-05 ENCOUNTER — TELEPHONE (OUTPATIENT)
Dept: TRANSPLANT | Facility: CLINIC | Age: 34
End: 2024-03-05
Payer: MEDICARE

## 2024-03-05 NOTE — TELEPHONE ENCOUNTER
ISSUE/PLAN  ----- Message from Gelacio Mcintyre MD sent at 3/4/2024 10:21 AM CST -----  Would do nothing given improvement in Scr. Needs to double void      OUTCOME  Spoke with Mona about renal US results. Plan for labs every 2 months, and attempt double voiding. She is in good understanding of the plan.

## 2024-03-16 DIAGNOSIS — Z31.69 PRE-CONCEPTION COUNSELING: ICD-10-CM

## 2024-03-18 RX ORDER — PRENATAL VIT/IRON FUM/FOLIC AC 27MG-0.8MG
1 TABLET ORAL EVERY EVENING
Qty: 90 TABLET | Refills: 3 | Status: SHIPPED | OUTPATIENT
Start: 2024-03-18 | End: 2024-06-13

## 2024-03-18 NOTE — TELEPHONE ENCOUNTER
Message to physician: pharmacy refill    Date of last visit: 11/29/2023    Date of next visit if scheduled: none    Potassium   Date Value Ref Range Status   02/29/2024 4.3 3.4 - 5.3 mmol/L Final   08/02/2022 4.4 3.5 - 5.0 mmol/L Final   07/07/2021 4.2 3.4 - 5.3 mmol/L Final     Creatinine   Date Value Ref Range Status   02/29/2024 1.08 (H) 0.51 - 0.95 mg/dL Final   07/07/2021 0.91 0.52 - 1.04 mg/dL Final     GFR Estimate   Date Value Ref Range Status   02/29/2024 70 >60 mL/min/1.73m2 Final   07/07/2021 86 >60 mL/min/[1.73_m2] Final     Comment:     Non  GFR Calc  Starting 12/18/2018, serum creatinine based estimated GFR (eGFR) will be   calculated using the Chronic Kidney Disease Epidemiology Collaboration   (CKD-EPI) equation.         BP Readings from Last 3 Encounters:   12/18/23 100/66   11/29/23 96/64   11/27/23 101/66       Hemoglobin A1C   Date Value Ref Range Status   02/22/2024 5.8 (H) <5.7 % Final     Comment:     Normal <5.7%   Prediabetes 5.7-6.4%    Diabetes 6.5% or higher     Note: Adopted from ADA consensus guidelines.   08/04/2020 5.5 0 - 5.6 % Final     Comment:     Normal <5.7% Prediabetes 5.7-6.4%  Diabetes 6.5% or higher - adopted from ADA   consensus guidelines.         Please complete refill and CLOSE ENCOUNTER.  Closing the encounter signifies the refill is complete.

## 2024-03-25 ENCOUNTER — VIRTUAL VISIT (OUTPATIENT)
Dept: SLEEP MEDICINE | Facility: CLINIC | Age: 34
End: 2024-03-25
Payer: MEDICARE

## 2024-03-25 VITALS
WEIGHT: 143 LBS | BODY MASS INDEX: 28.07 KG/M2 | HEIGHT: 60 IN | SYSTOLIC BLOOD PRESSURE: 122 MMHG | DIASTOLIC BLOOD PRESSURE: 75 MMHG

## 2024-03-25 DIAGNOSIS — G47.33 OSA (OBSTRUCTIVE SLEEP APNEA): Primary | ICD-10-CM

## 2024-03-25 PROCEDURE — 99213 OFFICE O/P EST LOW 20 MIN: CPT | Mod: 95 | Performed by: INTERNAL MEDICINE

## 2024-03-25 ASSESSMENT — SLEEP AND FATIGUE QUESTIONNAIRES
HOW LIKELY ARE YOU TO NOD OFF OR FALL ASLEEP WHILE SITTING QUIETLY AFTER LUNCH WITHOUT ALCOHOL: WOULD NEVER DOZE
HOW LIKELY ARE YOU TO NOD OFF OR FALL ASLEEP WHEN YOU ARE A PASSENGER IN A CAR FOR AN HOUR WITHOUT A BREAK: WOULD NEVER DOZE
HOW LIKELY ARE YOU TO NOD OFF OR FALL ASLEEP WHILE WATCHING TV: WOULD NEVER DOZE
HOW LIKELY ARE YOU TO NOD OFF OR FALL ASLEEP WHILE SITTING AND READING: WOULD NEVER DOZE
HOW LIKELY ARE YOU TO NOD OFF OR FALL ASLEEP IN A CAR, WHILE STOPPED FOR A FEW MINUTES IN TRAFFIC: WOULD NEVER DOZE
HOW LIKELY ARE YOU TO NOD OFF OR FALL ASLEEP WHILE SITTING AND TALKING TO SOMEONE: WOULD NEVER DOZE
HOW LIKELY ARE YOU TO NOD OFF OR FALL ASLEEP WHILE SITTING INACTIVE IN A PUBLIC PLACE: WOULD NEVER DOZE
HOW LIKELY ARE YOU TO NOD OFF OR FALL ASLEEP WHILE LYING DOWN TO REST IN THE AFTERNOON WHEN CIRCUMSTANCES PERMIT: SLIGHT CHANCE OF DOZING

## 2024-03-25 ASSESSMENT — PAIN SCALES - GENERAL: PAINLEVEL: NO PAIN (0)

## 2024-03-25 NOTE — PROGRESS NOTES
Virtual Visit Details    Type of service:  Video Visit   Video Start Time: 1:02 PM  Video End Time:1:08 PM  Originating Location (pt. Location): Home  Distant Location (provider location):  Off-site  Platform used for Video Visit: Location    Additional 15 minutes on the date of service was spent performing the following:    -Preparing to see the patient  -Obtaining and/or reviewing separately obtained history   -Ordering medications, tests, or procedures   -Documenting clinical information in the electronic or other health record     Thank you for the opportunity to participate in the care of Mona Wagner.     She is a 33 year old y/o female patient who comes to the sleep medicine clinic for follow up. The patient was diagnosed with FATOUMATA on 10/08/21 (AHI=9.4). The patient states that she still benefits from CPAP therapy. She likes the  current pressure settings the way it is set.     Assessment and Plan:  In summary Mona Wagner is a 33 year old year old female who is here for follow up.    1. FATOUMATA (obstructive sleep apnea)  I congratulated the patient on her excellent pressure usage. I will keep her on the same pressure range as per her request.  - COMPREHENSIVE DME       Compliance Download data for 30 Days:  Compliance:93%  Pressure setting:APAP 5-15 cwp  95% pressure:8.1 cwp  Leak:Minimal  Residual AHI:1.4 events per hour.  Mask Tolerance:Good  Skin irritation:None  DME:[facility]    Lab reviewed: Discussed with patient.    Cpap Fu Template    Question 3/25/2024 12:44 PM CDT - Filed by Patient   Do you use a CPAP Machine at home? Yes   Overall, on a scale of 0-10 how would you rate your CPAP? 8   Is your mask comfortable? Yes   Is your mask leaking? No   Do you notice snoring with mask on? No   Do you notice gasping arousals with mask on? No   Are you having significant oral or nasal dryness? No   Is the pressure setting comfortable? Yes   What type of mask do you use? Nasal Pillow   What is your typical  bedtime? 1am   How long does it take you to go to sleep on PAP therapy? 20-30 minutes   What time do you typically get out of bed for the day? 930 am   How many hours on average per night are you using PAP therapy? 6-7   How many hours are you sleeping per night? 6-7   Do you feel well rested in the morning? Yes       ALEXIS:  ALEXIS Total Score: 8  Total score - Robertson: 1 (3/25/2024 12:44 PM)    Failed to redirect to the Timeline version of the [employer] Smart[employer].   Patient Active Problem List   Diagnosis    Acquired hypothyroidism    Hypomagnesemia    Aftercare following organ transplant    Immunosuppressed status (H24)    Kidney replaced by transplant    Vitamin D deficiency    CKD (chronic kidney disease) stage 2, GFR 60-89 ml/min    Diarrhea    Bicornate uterus    Hydronephrosis of kidney transplant    Ureteral stenosis    Anemia in stage 2 chronic kidney disease    Calculus of gallbladder and bile duct without cholecystitis or obstruction       Past Medical History:   Diagnosis Date    Anemia in chronic kidney disease     Bacteremia 07/05/2023    Clostridium difficile colitis 8/25/2023    Difficult intubation     see 4/28/23 anesthesia notes    History of bacterial endocarditis     History of blood transfusion     History of DVT (deep vein thrombosis) 2014    History of seizure 2014    History of subarachnoid hemorrhage     Hydronephrosis     Hypothyroidism     Kidney transplanted 08/03/2019    DCD DDKT. Induction with thymo 6mg/kg.    Orthostatic hypotension     FATOUMATA (obstructive sleep apnea)     Pyelonephritis of transplanted kidney 01/23/2020    Secondary renal hyperparathyroidism (H24)     Urinary tract infection        Past Surgical History:   Procedure Laterality Date    BENCH KIDNEY N/A 8/3/2019    Procedure: BACKBENCH PREPARATION, ALLOGRAFT, KIDNEY;  Surgeon: Dorian Johnson MD;  Location: [facility]    COMBINED CYSTOSCOPY, RETROGRADES, EXCHANGE STENT URETER(S) Right 11/23/2020    Procedure: CYSTOSCOPY,  CYSOTGRAM, WITH RETROGRADE PYELOGRAM, ureteroscopy,  AND URETERAL STENT;  Surgeon: Wally Britt MD;  Location: [facility] OR    COMBINED CYSTOSCOPY, RETROGRADES, URETEROSCOPY, LASER HOLMIUM LITHOTRIPSY URETER(S), INSERT STENT N/A 2019    Procedure: CYSTOURETEROSCOPY, WITH RETROGRADE PYELOGRAM of transplant kidney, STENT INSERTION, Laser on standby;  Surgeon: Wally Britt MD;  Location: [facility]    CREATE FISTULA ARTERIOVENOUS UPPER EXTREMITY  2014    Procedure: CREATE FISTULA ARTERIOVENOUS UPPER EXTREMITY;  Left Wrist Arteriovenous Fistula Placement;  Surgeon: Shashi Castro MD;  Location: [facility] OR    CREATE FISTULA ARTERIOVENOUS UPPER EXTREMITY      CYSTOSCOPY, RETROGRADES, INSERT STENT URETER(S), COMBINED N/A 2020    Procedure: CYSTOSCOPY, WITH RETROGRADE PYELOGRAM, CYSTOGRAM AND URETERAL STENT INSERTION - TRANSPLANT KIDNEY;  Surgeon: Wally Britt MD;  Location: [facility]    IR CEREBRAL ANGIOGRAM  2014    IR NEPHROSTOMY TUBE CHANGE RIGHT  2023    IR NEPHROSTOMY TUBE PLACEMENT RIGHT  2023    PARATHYROIDECTOMY Bilateral 2023    Procedure: Bilateral Resection of 3 and 1/2 parathyroid glands;  Surgeon: Melissa Jones MD;  Location: [facility]    PERCUTANEOUS BIOPSY KIDNEY Right 2019    Procedure: Right Kidney Biopsy;  Surgeon: Deny Rios MD;  [facility]: [facility]    RASTA/DIALYSIS CATHETER  12/10/2013         TRANSPLANT KIDNEY RECIPIENT  DONOR N/A 8/3/2019    Procedure: TRANSPLANT, KIDNEY, RECIPIENT,  DONOR with Ureteral Stent Placement;  Surgeon: Dorian Johnson MD;  Location: [facility]       Current Outpatient Medications   Medication Sig Dispense Refill    acetaminophen (TYLENOL) 325 MG tablet Take 2 tablets (650 mg) by mouth every 4 hours as needed for mild pain 100 tablet 3    ketoconazole (NIZORAL) 2 % external shampoo Apply topically three times a week Lather in the shower, wait 3-5 minutes before rinsing. 100 mL 11    lactase (LACTAID)  3000 UNIT tablet Take 3,000 Units by mouth 3 times daily (with meals)      levothyroxine (SYNTHROID/LEVOTHROID) 25 MCG tablet Take 1 tablet (25 mcg) by mouth daily 90 tablet 4    magnesium oxide (MAG-OX) 400 MG tablet Take 1 tablet (400 mg) by mouth every morning 90 tablet 3    mycophenolic acid (GENERIC EQUIVALENT) 180 MG EC tablet Take 3 tablets (540 mg) by mouth 2 times daily 180 tablet 11    norethindrone (MICRONOR) 0.35 MG tablet Take 1 tablet (0.35 mg) by mouth daily 90 tablet 3    patiromer (VELTASSA) 8.4 g packet Take 8.4 g by mouth daily as needed (as directed by your transplant team, for potassium = or > 5.5)      Prenatal Vit-Fe Fumarate-FA (PRENATAL MULTIVITAMIN W/IRON) 27-0.8 MG tablet TAKE 1 TABLET BY MOUTH EVERY EVENING 90 tablet 3    simethicone (MYLICON) 125 MG chewable tablet Take 125 mg by mouth daily      tacrolimus (GENERIC EQUIVALENT) 1 MG capsule Take 3 capsules (3 mg) by mouth 2 times daily 180 capsule 11    Lactobacillus-Inulin (CULTURELLE DIGESTIVE DAILY) CAPS Take 1 tablet by mouth daily (Patient not taking: Reported on 12/18/2023)      tacrolimus (GENERIC) 0.5 MG capsule Take 0.5 mg by mouth 2 times daily Total dose 3mg bid (Patient not taking: Reported on 3/25/2024)         Allergies   Allergen Reactions    Contrast Dye Rash     CT contrast allergy 12/14/19 rash over eyes. Need to have pre medication before a CT WITH CONTRAST     Diatrizoate Rash     CT contrast allergy 12/14/19 rash over eyes. Need to have pre medication before a CT WITH CONTRAST     Lisinopril Swelling     angioedema    Nitrous Oxide Other (See Comments)     Sense of doom    Sulfa Antibiotics Rash     Muscle stiffness of neck    Dapsone      Methemoglobinemia    Furosemide Other (See Comments)     Skin flushing    Metrogel [Metronidazole]      Hives diffusely on body after vaginal administration.    Azithromycin Dizziness and Rash    Cefuroxime Rash       Visual  Exam:  GEN: NAD,  Psych: normal mood, normal  "affect    Labs/Studies:      Lab Results   Component Value Date    PH 7.36 07/10/2023    PH 7.36 06/06/2023     Lab Results   Component Value Date    TSH 2.78 02/22/2024    TSH 2.04 11/27/2023     Lab Results   Component Value Date    GLC 97 02/29/2024     (H) 02/22/2024     Lab Results   Component Value Date    HGB 11.4 (L) 02/29/2024    HGB 11.9 02/22/2024     Lab Results   Component Value Date    BUN 24.3 (H) 02/29/2024    BUN 30.4 (H) 02/22/2024    CR 1.08 (H) 02/29/2024    CR 1.32 (H) 02/22/2024     Lab Results   Component Value Date    AST 17 11/29/2023    AST 19 09/08/2023    ALT 25 11/29/2023    ALT 15 09/08/2023    ALKPHOS 65 11/29/2023    ALKPHOS 79 09/08/2023    BILITOTAL 0.8 11/29/2023    BILITOTAL 0.5 09/08/2023    BILICONJ 0.0 12/10/2013    BILIDIRECT 0.2 12/03/2013     No results found for: \"UAMP\", \"UBARB\", \"BENZODIAZEUR\", \"UCANN\", \"UCOC\", \"OPIT\", \"UPCP\"    Recent Labs   Lab Test 02/29/24  0928 02/22/24  0943    139   POTASSIUM 4.3 4.5   CHLORIDE 102 103   CO2 26 28   ANIONGAP 10 8   GLC 97 103*   BUN 24.3* 30.4*   CR 1.08* 1.32*   SHAMIR 9.6 9.6       Ferritin   Date Value Ref Range Status   10/18/2023 771 (H) 6 - 175 ng/mL Final   08/04/2020 1,065 (H) 12 - 150 ng/mL Final       I reviewed the efficacy and compliance report from her device. Data summarized on the HPI and the PAP compliance flow sheet.     Patient verbalized understanding of these issues, agrees with the plan and all questions were answered today. Patient was given an opportuntity to voice any other symptoms or concerns not listed above. Patient did not have any other symptoms or concerns.      Jas Lyon DO  Board Certified in Internal Medicine and Sleep Medicine    (Note created with Dragon voice recognition and unintended spelling errors and word substitutions may occur)     Audio and visual devices were used for this virtual clinic visit with permission from patient.    "

## 2024-03-25 NOTE — NURSING NOTE
Has patient had flu shot for current/most recent flu season? If so, when? Yes: 9/21/23        Is the patient currently in the state of [place]? YES    Visit mode:VIDEO    If the visit is dropped, the patient can be reconnected by: VIDEO VISIT: Send to e-mail at: [email or web address]    Will anyone else be joining the visit? NO  (If patient encounters technical issues they should call [phone]    How would you like to obtain your AVS? MyChart    Are changes needed to the allergy or medication list? No    Reason for visit: RECHECK    Susana CUNHA

## 2024-03-28 ENCOUNTER — DOCUMENTATION ONLY (OUTPATIENT)
Dept: INFECTIOUS DISEASES | Facility: CLINIC | Age: 34
End: 2024-03-28
Payer: MEDICARE

## 2024-03-30 DIAGNOSIS — E03.9 ACQUIRED HYPOTHYROIDISM: ICD-10-CM

## 2024-04-04 RX ORDER — LEVOTHYROXINE SODIUM 25 UG/1
25 TABLET ORAL DAILY
Qty: 90 TABLET | Refills: 4 | OUTPATIENT
Start: 2024-04-04

## 2024-04-04 NOTE — TELEPHONE ENCOUNTER
Should have refills on file, last script written:    levothyroxine (SYNTHROID/LEVOTHROID) 25 MCG tablet 90 tablet 4 10/6/2023 - No  Sig - Route: Take 1 tablet (25 mcg) by mouth daily - Oral    [id number] - [place], [place] - [address] AT [facility]

## 2024-04-17 ENCOUNTER — TELEPHONE (OUTPATIENT)
Dept: TRANSPLANT | Facility: CLINIC | Age: 34
End: 2024-04-17

## 2024-04-17 ENCOUNTER — LAB (OUTPATIENT)
Dept: LAB | Facility: HOSPITAL | Age: 34
End: 2024-04-17
Payer: MEDICARE

## 2024-04-17 ENCOUNTER — TELEPHONE (OUTPATIENT)
Dept: ENDOCRINOLOGY | Facility: CLINIC | Age: 34
End: 2024-04-17

## 2024-04-17 DIAGNOSIS — Z94.0 KIDNEY REPLACED BY TRANSPLANT: ICD-10-CM

## 2024-04-17 DIAGNOSIS — D84.9 IMMUNOSUPPRESSED STATUS (H): ICD-10-CM

## 2024-04-17 DIAGNOSIS — Z48.298 AFTERCARE FOLLOWING ORGAN TRANSPLANT: ICD-10-CM

## 2024-04-17 DIAGNOSIS — R73.01 IMPAIRED FASTING GLUCOSE: ICD-10-CM

## 2024-04-17 LAB
ALBUMIN MFR UR ELPH: 9.9 MG/DL
ANION GAP SERPL CALCULATED.3IONS-SCNC: 11 MMOL/L (ref 7–15)
BUN SERPL-MCNC: 21.4 MG/DL (ref 6–20)
CALCIUM SERPL-MCNC: 9.6 MG/DL (ref 8.6–10)
CHLORIDE SERPL-SCNC: 103 MMOL/L (ref 98–107)
CREAT SERPL-MCNC: 1.02 MG/DL (ref 0.51–0.95)
CREAT UR-MCNC: 54.8 MG/DL
DEPRECATED HCO3 PLAS-SCNC: 25 MMOL/L (ref 22–29)
EGFRCR SERPLBLD CKD-EPI 2021: 75 ML/MIN/1.73M2
ERYTHROCYTE [DISTWIDTH] IN BLOOD BY AUTOMATED COUNT: 13.3 % (ref 10–15)
GLUCOSE SERPL-MCNC: 101 MG/DL (ref 70–99)
HCT VFR BLD AUTO: 38 % (ref 35–47)
HGB BLD-MCNC: 11.8 G/DL (ref 11.7–15.7)
MCH RBC QN AUTO: 27.4 PG (ref 26.5–33)
MCHC RBC AUTO-ENTMCNC: 31.1 G/DL (ref 31.5–36.5)
MCV RBC AUTO: 88 FL (ref 78–100)
PLATELET # BLD AUTO: 214 10E3/UL (ref 150–450)
POTASSIUM SERPL-SCNC: 4.4 MMOL/L (ref 3.4–5.3)
PROT/CREAT 24H UR: 0.18 MG/MG CR (ref 0–0.2)
RBC # BLD AUTO: 4.31 10E6/UL (ref 3.8–5.2)
SODIUM SERPL-SCNC: 139 MMOL/L (ref 135–145)
TACROLIMUS BLD-MCNC: 5.4 UG/L (ref 5–15)
TME LAST DOSE: NORMAL H
TME LAST DOSE: NORMAL H
TSH SERPL DL<=0.005 MIU/L-ACNC: 1.8 UIU/ML (ref 0.3–4.2)
WBC # BLD AUTO: 6.1 10E3/UL (ref 4–11)

## 2024-04-17 PROCEDURE — 36415 COLL VENOUS BLD VENIPUNCTURE: CPT

## 2024-04-17 NOTE — TELEPHONE ENCOUNTER
Discussed with Mago that form was faxed 4/15, and again today to both store and [facility]. She will reach out to [employer] for an update.

## 2024-04-17 NOTE — TELEPHONE ENCOUNTER
Pt has normal tsh  = calcium also normal. On levothyroxine at 25 mcg daily    -  Dear Mona    TSH is normal!    If you have any questions, please feel free to contact my nurse at [phone] select option #3 for triage nurse  or  option #1 for scheduling related questions.    Regards    Katrin Lopez MD     Lab on 04/17/2024   Component Date Value Ref Range Status    TSH 04/17/2024 1.80  0.30 - 4.20 uIU/mL Final    Total Protein Urine mg/dL 04/17/2024 9.9    mg/dL Final    The reference ranges have not been established in urine protein. The results should be integrated into the clinical context for interpretation.    Total Protein Urine mg/mg Creat 04/17/2024 0.18  0.00 - 0.20 mg/mg Cr Final    Creatinine Urine mg/dL 04/17/2024 54.8  mg/dL Final    The reference ranges have not been established in urine creatinine. The results should be integrated into the clinical context for interpretation.    Sodium 04/17/2024 139  135 - 145 mmol/L Final    Reference intervals for this test were updated on 09/26/2023 to more accurately reflect our healthy population. There may be differences in the flagging of prior results with similar values performed with this method. Interpretation of those prior results can be made in the context of the updated reference intervals.     Potassium 04/17/2024 4.4  3.4 - 5.3 mmol/L Final    Chloride 04/17/2024 103  98 - 107 mmol/L Final    Carbon Dioxide (CO2) 04/17/2024 25  22 - 29 mmol/L Final    Anion Gap 04/17/2024 11  7 - 15 mmol/L Final    Urea Nitrogen 04/17/2024 21.4 (H)  6.0 - 20.0 mg/dL Final    Creatinine 04/17/2024 1.02 (H)  0.51 - 0.95 mg/dL Final    GFR Estimate 04/17/2024 75  >60 mL/min/1.73m2 Final    Calcium 04/17/2024 9.6  8.6 - 10.0 mg/dL Final    Glucose 04/17/2024 101 (H)  70 - 99 mg/dL Final    WBC Count 04/17/2024 6.1  4.0 - 11.0 10e3/uL Final    RBC Count 04/17/2024 4.31  3.80 - 5.20 10e6/uL Final    Hemoglobin 04/17/2024 11.8  11.7 - 15.7 g/dL Final    Hematocrit  04/17/2024 38.0  35.0 - 47.0 % Final    MCV 04/17/2024 88  78 - 100 fL Final    MCH 04/17/2024 27.4  26.5 - 33.0 pg Final    MCHC 04/17/2024 31.1 (L)  31.5 - 36.5 g/dL Final    RDW 04/17/2024 13.3  10.0 - 15.0 % Final    Platelet Count 04/17/2024 214  150 - 450 10e3/uL Final

## 2024-04-17 NOTE — TELEPHONE ENCOUNTER
Patient Call: General  Route to LPN    Reason for call: Chantal from [facility] pharmacy called to get update on patient's CMN form. Patient and have pharmacy have been waiting since last Friday. More details with call back.     Call back needed? Yes    Return Call Needed  Same as documented in contacts section  When to return call?: Same day: Route High Priority

## 2024-06-13 ENCOUNTER — VIRTUAL VISIT (OUTPATIENT)
Dept: TRANSPLANT | Facility: CLINIC | Age: 34
End: 2024-06-13
Attending: INTERNAL MEDICINE
Payer: MEDICARE

## 2024-06-13 ENCOUNTER — LAB (OUTPATIENT)
Dept: LAB | Facility: HOSPITAL | Age: 34
End: 2024-06-13
Payer: MEDICARE

## 2024-06-13 DIAGNOSIS — Z94.0 KIDNEY REPLACED BY TRANSPLANT: Primary | ICD-10-CM

## 2024-06-13 DIAGNOSIS — Z48.298 AFTERCARE FOLLOWING ORGAN TRANSPLANT: ICD-10-CM

## 2024-06-13 DIAGNOSIS — D84.9 IMMUNOSUPPRESSED STATUS (H): ICD-10-CM

## 2024-06-13 DIAGNOSIS — M79.672 LEFT FOOT PAIN: ICD-10-CM

## 2024-06-13 DIAGNOSIS — E79.0 HYPERURICEMIA: ICD-10-CM

## 2024-06-13 DIAGNOSIS — Z94.0 KIDNEY REPLACED BY TRANSPLANT: ICD-10-CM

## 2024-06-13 PROBLEM — R19.7 DIARRHEA: Status: RESOLVED | Noted: 2020-01-26 | Resolved: 2024-06-13

## 2024-06-13 LAB
ANION GAP SERPL CALCULATED.3IONS-SCNC: 11 MMOL/L (ref 7–15)
BUN SERPL-MCNC: 26.2 MG/DL (ref 6–20)
CALCIUM SERPL-MCNC: 9 MG/DL (ref 8.6–10)
CHLORIDE SERPL-SCNC: 106 MMOL/L (ref 98–107)
CREAT SERPL-MCNC: 1.13 MG/DL (ref 0.51–0.95)
DEPRECATED HCO3 PLAS-SCNC: 24 MMOL/L (ref 22–29)
EGFRCR SERPLBLD CKD-EPI 2021: 66 ML/MIN/1.73M2
ERYTHROCYTE [DISTWIDTH] IN BLOOD BY AUTOMATED COUNT: 13.1 % (ref 10–15)
GLUCOSE SERPL-MCNC: 103 MG/DL (ref 70–99)
HCT VFR BLD AUTO: 37.2 % (ref 35–47)
HGB BLD-MCNC: 11.4 G/DL (ref 11.7–15.7)
MCH RBC QN AUTO: 27.1 PG (ref 26.5–33)
MCHC RBC AUTO-ENTMCNC: 30.6 G/DL (ref 31.5–36.5)
MCV RBC AUTO: 89 FL (ref 78–100)
PLATELET # BLD AUTO: 222 10E3/UL (ref 150–450)
POTASSIUM SERPL-SCNC: 4.2 MMOL/L (ref 3.4–5.3)
RBC # BLD AUTO: 4.2 10E6/UL (ref 3.8–5.2)
SODIUM SERPL-SCNC: 141 MMOL/L (ref 135–145)
TACROLIMUS BLD-MCNC: 5.9 UG/L (ref 5–15)
TME LAST DOSE: NORMAL H
TME LAST DOSE: NORMAL H
URATE SERPL-MCNC: 7.8 MG/DL (ref 2.4–5.7)
WBC # BLD AUTO: 6.6 10E3/UL (ref 4–11)

## 2024-06-13 PROCEDURE — 36415 COLL VENOUS BLD VENIPUNCTURE: CPT

## 2024-06-13 PROCEDURE — 99214 OFFICE O/P EST MOD 30 MIN: CPT | Mod: 95 | Performed by: INTERNAL MEDICINE

## 2024-06-13 PROCEDURE — G2211 COMPLEX E/M VISIT ADD ON: HCPCS | Mod: 95 | Performed by: INTERNAL MEDICINE

## 2024-06-13 RX ORDER — PRENATAL VIT/IRON FUM/FOLIC AC 27MG-0.8MG
1 TABLET ORAL EVERY OTHER DAY
COMMUNITY
Start: 2024-06-13 | End: 2024-06-20

## 2024-06-13 RX ORDER — CETIRIZINE HYDROCHLORIDE 10 MG/1
10 TABLET ORAL DAILY
COMMUNITY
Start: 2024-06-13 | End: 2024-09-20

## 2024-06-13 NOTE — PROGRESS NOTES
Video-Visit Details    Type of service:  Video Visit   Video start time: 1:47pm  Video End Time:2:06 PM  Originating Location (pt. Location): Home    Distant Location (provider location):  Off-site  Platform used for Video Visit: [facility]      TRANSPLANT NEPHROLOGY CHRONIC POST TRANSPLANT VISIT    Assessment & Plan   # DDKT: Stable    - Baseline Creatinine:  ~ 1-1.3   - Proteinuria: Normal (<0.2 grams)   - Date DSA Last Checked: Oct/2023      Latest DSA: No cPRA: 26%   - BK Viremia: No   - Kidney Tx Biopsy: Sep 17, 2019; Result: No diagnostic evidence of acute rejection.    # Immunosuppression: Tacrolimus immediate release (goal 4-6) and Mycophenolic acid (dose 540 mg every 12 hours)   - Continue with intensive monitoring of immunosuppression for efficacy and toxicity.   - Changes: Not at this time; Changed off azathioprine back to mycophenolic acid following pregnancy.    # Infection Prophylaxis:   Last CD4 Level: 201 (Jul/2020)  - PJP: None    # Blood Pressure: Controlled;  Goal BP: > 100, but < 130 systolic   - Changes: Not at this time    # Anemia in Chronic Renal Disease: Hgb: Stable      CHARLINE: No   - Iron studies: Replete    # Mineral Bone Disorder: Patient is s/p parathyroidectomy 4/2023 with 3.5 glands removed.  - Tertiary renal hyperparathyroidism; PTH level:  Low (<15 pg/ml)         On treatment: None  - Vitamin D; level: Normal        On supplement: Yes  - Calcium; level: Normal        On supplement: Yes    # Electrolytes:   - Potassium; level: Normal        On supplement: No  - Magnesium; level: Not checked recently        On supplement: Yes  - Bicarbonate; level: Normal        On supplement: No    # Kidney Transplant Ureteral Stenosis/Hydronephrosis:   -Patient with moderate hydronephrosis of kidney transplant during pregnancyand developed CAMERON.  She underwent PNT 6/9/23 with repeat kidney transplant ultrasound 6/11/23 still showing moderate hydronephrosis suggesting this may be a functional  hydronephrosis from pregnancy  - She has a previous h/o ureteral stenosis with ureteral stent removed 2/2021.  Previous kidney transplant ultrasound 7/2021 also showed moderate hydronephrosis unchanged with voiding. Hydronephrosis was unchanged on 7/6/22 abdominal CT.    -PNT removed 10/13/23  -Subsequent U/S 3/1/24 with mild hydronephrosis. Recommend double voiding    # Biliary colic:   -Went to the ED on 9/8/23 and was diagnosed with choleilithisis. She recently re-introduced fat into her diet and since 11/19 she has been having RUQ pain a few hours after eating as well as watery stool   -Seen by Dr. Johnson with transplant surgery on 12/18/23 with HIDA obtained on 12/22/23 that was negative for cholecystitis and biliary dyskinesia   -Plan would be for lap shital if patient wants to proceed at a later time    # Norovirus:   -She was found to have + norovirus on 11/29/24. She is also colonized with C.diff   -MPA was decreased for 10d and prescribed nitazoxanide but insurance did not cover    # L foot pain:   -Plan for Xray to evaluate for fracture. Obtain uric acid. If uric acid is elevated will prescribe prednisone burst.     -Would like to hold off on allopurinol if possible because she may want to get pregnant and go back on AZA.    # Pregnancy/Preeclampsia: Patient delivered at 32 weeks 6 days with some preeclampsia symptoms.      # GERD: Asymptomatic and now off medications.    # Hyperprolactinemia: Slightly elevated prolactin at 25 at last check 10/6/23.  Felt secondary to hypothyroidism.  Followed by Endocrinology.     # Right Axillary Lymph Node: Patient was referred to general surgery for excisional biopsy in 8/2022, but decision was made to watch the node.  Node appears to be stable for the last year at least and per patient she has had it for > 5 years with no changes in size.    # Skin Cancer Risk:    - Discussed sun protection and recommend regular follow up with Dermatology.    # Family planning:   -Per  patient she may want to get pregnant again. She will think about it. I did tell her that she almost certainly would develop hydronephrosis requiring PNT again. Her Scr did increase to ~1.2 when it was 0.8-1 pre pregnancy and this could increase again.    -When she would like to get pregnant again would change back to AZA from MPA and ask her to see high risk OB again    # Medical Compliance: Yes    # Health Maintenance and Vaccination Review: Recommend:  Shingrix , COVID booster    # Transplant History:  Etiology of Kidney Failure: Unknown etiology (no kidney biopsy)  Tx: DDKT  Transplant: 8/3/2019 (Kidney)  Significant changes in immunosuppression:  No  Significant transplant-related complications: Transplant ureteral stenosis    Transplant Office Phone Number: [phone]    Assessment and plan was discussed with the patient and she voiced her understanding and agreement.    Return visit: Return in about 1 year (around 6/13/2025).    Gelacio Mcintyre MD    The longitudinal plan of care for kidney transplant was addressed during this visit. Due to the added complexity in care, I will continue to support Moan Wagner in the subsequent management of this condition(s) and with the ongoing continuity of care of this condition(s).       Chief Complaint   Ms. Wagner is a 33 year old here for kidney transplant and immunosuppression management.    History of Present Illness   Ms. Wagner was seen by Dr. Johnson in transplant surgery clinic and had a HIDA scan that was negative for cholecystitis or biliary dyskinesia. She does not yet want a lap shital.     The patient overall feels well. she denies any recent hospitalizations. she denies nausea, vomiting, diarrhea, fever, chills, shortness of breath, LE edema, unintentional weight loss, nights sweats, dysuria, hematuria. She has noticed that every once in a while she develops chest pain for a few seconds when she is dehydrated that improves with intake of fluids.     She does state  that her L foot has been hurting on her big toe for the past month, ebb and flow. She denies trauma and wonders if she has gout.     Home BP:  115 systolic       Problem List   Patient Active Problem List   Diagnosis    Acquired hypothyroidism    Hypomagnesemia    Aftercare following organ transplant    Immunosuppressed status (H24)    Kidney replaced by transplant    Vitamin D deficiency    CKD (chronic kidney disease) stage 2, GFR 60-89 ml/min    Diarrhea    Bicornate uterus    Hydronephrosis of kidney transplant    Ureteral stenosis    Anemia in stage 2 chronic kidney disease    Calculus of gallbladder and bile duct without cholecystitis or obstruction       Allergies   Allergies   Allergen Reactions    Contrast Dye Rash     CT contrast allergy 12/14/19 rash over eyes. Need to have pre medication before a CT WITH CONTRAST     Diatrizoate Rash     CT contrast allergy 12/14/19 rash over eyes. Need to have pre medication before a CT WITH CONTRAST     Lisinopril Swelling     angioedema    Nitrous Oxide Other (See Comments)     Sense of doom    Sulfa Antibiotics Rash     Muscle stiffness of neck    Dapsone      Methemoglobinemia    Furosemide Other (See Comments)     Skin flushing    Metrogel [Metronidazole]      Hives diffusely on body after vaginal administration.    Azithromycin Dizziness and Rash    Cefuroxime Rash       Medications   Current Outpatient Medications   Medication Sig Dispense Refill    cetirizine (ZYRTEC) 10 MG tablet Take 1 tablet (10 mg) by mouth daily      Prenatal Vit-Fe Fumarate-FA (PRENATAL MULTIVITAMIN W/IRON) 27-0.8 MG tablet Take 1 tablet by mouth every other day      acetaminophen (TYLENOL) 325 MG tablet Take 2 tablets (650 mg) by mouth every 4 hours as needed for mild pain 100 tablet 3    ketoconazole (NIZORAL) 2 % external shampoo Apply topically three times a week Lather in the shower, wait 3-5 minutes before rinsing. 100 mL 11    lactase (LACTAID) 3000 UNIT tablet Take 3,000 Units by  mouth 3 times daily (with meals)      levothyroxine (SYNTHROID/LEVOTHROID) 25 MCG tablet Take 1 tablet (25 mcg) by mouth daily 90 tablet 4    magnesium oxide (MAG-OX) 400 MG tablet Take 1 tablet (400 mg) by mouth every morning 90 tablet 3    mycophenolic acid (GENERIC EQUIVALENT) 180 MG EC tablet Take 3 tablets (540 mg) by mouth 2 times daily 180 tablet 11    norethindrone (MICRONOR) 0.35 MG tablet Take 1 tablet (0.35 mg) by mouth daily 90 tablet 3    patiromer (VELTASSA) 8.4 g packet Take 8.4 g by mouth daily as needed (as directed by your transplant team, for potassium = or > 5.5)      simethicone (MYLICON) 125 MG chewable tablet Take 125 mg by mouth daily as needed      tacrolimus (GENERIC EQUIVALENT) 1 MG capsule Take 3 capsules (3 mg) by mouth 2 times daily 180 capsule 11    tacrolimus (GENERIC) 0.5 MG capsule Take 0.5 mg by mouth 2 times daily Total dose 3mg bid (Patient not taking: Reported on 3/25/2024)       No current facility-administered medications for this visit.     Facility-Administered Medications Ordered in Other Visits   Medication Dose Route Frequency Provider Last Rate Last Admin    iopamidol (ISOVUE-250) solution 100 mL  100 mL Tube Once Trav Silva PA-C         There are no discontinued medications.        Physical Exam     No vitals available for video visit    GENERAL APPEARANCE: alert and no distress  HENT: no obvious abnormalities on appearance  RESP: breathing appears unremarkable with normal rate, no audible wheezing or cough and no apparent shortness of breath with conversation  MS: extremities normal - no gross deformities noted  SKIN: no apparent rash and normal skin tone  NEURO: speech is clear with no obvious neurological deficits  PSYCH: mentation appears normal and affect normal       Data         Latest Ref Rng & Units 4/17/2024     9:44 AM 2/29/2024     9:28 AM 2/22/2024     9:43 AM   Renal   Sodium 135 - 145 mmol/L 139  138  139    K 3.4 - 5.3 mmol/L 4.4  4.3   4.5    Cl 98 - 107 mmol/L 103  102  103    Cl (external) 98 - 107 mmol/L 103  102  103    CO2 22 - 29 mmol/L 25  26  28    Urea Nitrogen 6.0 - 20.0 mg/dL 21.4  24.3  30.4    Creatinine 0.51 - 0.95 mg/dL 1.02  1.08  1.32    Glucose 70 - 99 mg/dL 101  97  103    Calcium 8.6 - 10.0 mg/dL 9.6  9.6  9.6          Latest Ref Rng & Units 10/3/2023     9:52 AM 7/13/2023    10:15 AM 6/15/2023    10:09 AM   Bone Health   Phosphorus 2.5 - 4.5 mg/dL  3.3  3.6    Vit D Def 20 - 50 ng/mL 46            Latest Ref Rng & Units 4/17/2024     9:44 AM 2/29/2024     9:28 AM 2/22/2024     9:43 AM   Heme   WBC 4.0 - 11.0 10e3/uL 6.1  6.2  6.6    Hgb 11.7 - 15.7 g/dL 11.8  11.4  11.9    Plt 150 - 450 10e3/uL 214  226  226          Latest Ref Rng & Units 11/29/2023     9:02 AM 9/8/2023     2:34 AM 8/14/2023    12:49 PM   Liver   AP 40 - 150 U/L 65  79  112    TBili <=1.2 mg/dL 0.8  0.5  0.7    ALT 0 - 50 U/L 25  15  36    AST 0 - 45 U/L 17  19  36    Tot Protein 6.4 - 8.3 g/dL 8.1  7.2  6.3    Albumin 3.5 - 5.2 g/dL 4.6  4.1  3.2          Latest Ref Rng & Units 2/22/2024     9:43 AM 11/29/2023     9:02 AM 10/6/2023     9:41 AM   Pancreas   A1C <5.7 % 5.8   5.7    Lipase (Roche) 13 - 60 U/L  41           Latest Ref Rng & Units 10/18/2023     9:32 AM 10/3/2023     9:52 AM 7/31/2023    10:17 AM   Iron studies   Iron 37 - 145 ug/dL 56  40  94    Iron Sat Index 15 - 46 % 26  18  35    Ferritin 6 - 175 ng/mL 771  808  1,525          Latest Ref Rng & Units 7/7/2021     9:20 AM 6/2/2021     9:25 AM 5/3/2021     9:02 AM   [facility] Txp Virology   BK Spec  Plasma  Plasma  Plasma    BK Res BKNEG^BK Virus DNA Not Detected copies/mL BK Virus DNA Not Detected  BK Virus DNA Not Detected  BK Virus DNA Not Detected    BK Log <2.7 Log copies/mL Not Calculated  Not Calculated  Not Calculated      Failed to redirect to the Timeline version of the REVFS SmartLink.  Recent Labs   Lab Test 02/22/24  0943 02/29/24  0928 04/17/24  0944   DOSTAC 2/21/2024 2/28/2024  4/16/2024   TACROL 5.7 5.8 5.4     Recent Labs   Lab Test 08/16/19  0807 09/03/19  0944   DOSMPA Not Provided 0840pm   MPACID 1.92 3.39   MPAG 149.2* 52.8

## 2024-06-13 NOTE — PATIENT INSTRUCTIONS
Patient Recommendations:  - I recommend COVID booster and Shingrix vaccines  -Plan for X ray of L foot     Transplant Patient Information  Your Post Transplant Coordinator is: Delicia Gaona   For non urgent items, we encourage you to contact your coordinator/care team online via [email or web address]  You and your care team can also contact your transplant coordinator Monday - Friday, 8am - 5pm at [phone] (Option 2 to reach the coordinator or Option 4 to schedule an appointment).  After hours for urgent matters, please call [facility] at [phone].

## 2024-06-13 NOTE — LETTER
6/13/2024      Mona Wagner  [address] [place] [address]      Dear Colleague,    Thank you for referring your patient, Mona Wagner, to the [facility]. Please see a copy of my visit note below.      Video-Visit Details    Type of service:  Video Visit   Video start time: 1:47pm  Video End Time:2:06 PM  Originating Location (pt. Location): Home    Distant Location (provider location):  Off-site  Platform used for Video Visit: [facility]      TRANSPLANT NEPHROLOGY CHRONIC POST TRANSPLANT VISIT    Assessment & Plan  # DDKT: Stable    - Baseline Creatinine:  ~ 1-1.3   - Proteinuria: Normal (<0.2 grams)   - Date DSA Last Checked: Oct/2023      Latest DSA: No cPRA: 26%   - BK Viremia: No   - Kidney Tx Biopsy: Sep 17, 2019; Result: No diagnostic evidence of acute rejection.    # Immunosuppression: Tacrolimus immediate release (goal 4-6) and Mycophenolic acid (dose 540 mg every 12 hours)   - Continue with intensive monitoring of immunosuppression for efficacy and toxicity.   - Changes: Not at this time; Changed off azathioprine back to mycophenolic acid following pregnancy.    # Infection Prophylaxis:   Last CD4 Level: 201 (Jul/2020)  - PJP: None    # Blood Pressure: Controlled;  Goal BP: > 100, but < 130 systolic   - Changes: Not at this time    # Anemia in Chronic Renal Disease: Hgb: Stable      CHARLINE: No   - Iron studies: Replete    # Mineral Bone Disorder: Patient is s/p parathyroidectomy 4/2023 with 3.5 glands removed.  - Tertiary renal hyperparathyroidism; PTH level:  Low (<15 pg/ml)         On treatment: None  - Vitamin D; level: Normal        On supplement: Yes  - Calcium; level: Normal        On supplement: Yes    # Electrolytes:   - Potassium; level: Normal        On supplement: No  - Magnesium; level: Not checked recently        On supplement: Yes  - Bicarbonate; level: Normal        On supplement: No    # Kidney Transplant Ureteral Stenosis/Hydronephrosis:   -Patient with moderate  hydronephrosis of kidney transplant during pregnancyand developed CAMERON.  She underwent PNT 6/9/23 with repeat kidney transplant ultrasound 6/11/23 still showing moderate hydronephrosis suggesting this may be a functional hydronephrosis from pregnancy  - She has a previous h/o ureteral stenosis with ureteral stent removed 2/2021.  Previous kidney transplant ultrasound 7/2021 also showed moderate hydronephrosis unchanged with voiding. Hydronephrosis was unchanged on 7/6/22 abdominal CT.    -PNT removed 10/13/23  -Subsequent U/S 3/1/24 with mild hydronephrosis. Recommend double voiding    # Biliary colic:   -Went to the ED on 9/8/23 and was diagnosed with choleilithisis. She recently re-introduced fat into her diet and since 11/19 she has been having RUQ pain a few hours after eating as well as watery stool   -Seen by Dr. Johnson with transplant surgery on 12/18/23 with HIDA obtained on 12/22/23 that was negative for cholecystitis and biliary dyskinesia   -Plan would be for lap shital if patient wants to proceed at a later time    # Norovirus:   -She was found to have + norovirus on 11/29/24. She is also colonized with C.diff   -MPA was decreased for 10d and prescribed nitazoxanide but insurance did not cover    # L foot pain:   -Plan for Xray to evaluate for fracture. Obtain uric acid. If uric acid is elevated will prescribe prednisone burst.     -Would like to hold off on allopurinol if possible because she may want to get pregnant and go back on AZA.    # Pregnancy/Preeclampsia: Patient delivered at 32 weeks 6 days with some preeclampsia symptoms.      # GERD: Asymptomatic and now off medications.    # Hyperprolactinemia: Slightly elevated prolactin at 25 at last check 10/6/23.  Felt secondary to hypothyroidism.  Followed by Endocrinology.     # Right Axillary Lymph Node: Patient was referred to general surgery for excisional biopsy in 8/2022, but decision was made to watch the node.  Node appears to be stable for the  last year at least and per patient she has had it for > 5 years with no changes in size.    # Skin Cancer Risk:    - Discussed sun protection and recommend regular follow up with Dermatology.    # Family planning:   -Per patient she may want to get pregnant again. She will think about it. I did tell her that she almost certainly would develop hydronephrosis requiring PNT again. Her Scr did increase to ~1.2 when it was 0.8-1 pre pregnancy and this could increase again.    -When she would like to get pregnant again would change back to AZA from MPA and ask her to see high risk OB again    # Medical Compliance: Yes    # Health Maintenance and Vaccination Review: Recommend:  Shingrix , COVID booster    # Transplant History:  Etiology of Kidney Failure: Unknown etiology (no kidney biopsy)  Tx: DDKT  Transplant: 8/3/2019 (Kidney)  Significant changes in immunosuppression:  No  Significant transplant-related complications: Transplant ureteral stenosis    Transplant Office Phone Number: [phone]    Assessment and plan was discussed with the patient and she voiced her understanding and agreement.    Return visit: Return in about 1 year (around 6/13/2025).    Gelacio Mcintyre MD    The longitudinal plan of care for kidney transplant was addressed during this visit. Due to the added complexity in care, I will continue to support Mona Wagner in the subsequent management of this condition(s) and with the ongoing continuity of care of this condition(s).       Chief Complaint  Ms. Wagner is a 33 year old here for kidney transplant and immunosuppression management.    History of Present Illness  Ms. Wagner was seen by Dr. Johnson in transplant surgery clinic and had a HIDA scan that was negative for cholecystitis or biliary dyskinesia. She does not yet want a lap shital.     The patient overall feels well. she denies any recent hospitalizations. she denies nausea, vomiting, diarrhea, fever, chills, shortness of breath, LE edema,  unintentional weight loss, nights sweats, dysuria, hematuria. She has noticed that every once in a while she develops chest pain for a few seconds when she is dehydrated that improves with intake of fluids.     She does state that her L foot has been hurting on her big toe for the past month, ebb and flow. She denies trauma and wonders if she has gout.     Home BP:  115 systolic       Problem List  Patient Active Problem List   Diagnosis     Acquired hypothyroidism     Hypomagnesemia     Aftercare following organ transplant     Immunosuppressed status (H24)     Kidney replaced by transplant     Vitamin D deficiency     CKD (chronic kidney disease) stage 2, GFR 60-89 ml/min     Diarrhea     Bicornate uterus     Hydronephrosis of kidney transplant     Ureteral stenosis     Anemia in stage 2 chronic kidney disease     Calculus of gallbladder and bile duct without cholecystitis or obstruction       Allergies  Allergies   Allergen Reactions     Contrast Dye Rash     CT contrast allergy 12/14/19 rash over eyes. Need to have pre medication before a CT WITH CONTRAST      Diatrizoate Rash     CT contrast allergy 12/14/19 rash over eyes. Need to have pre medication before a CT WITH CONTRAST      Lisinopril Swelling     angioedema     Nitrous Oxide Other (See Comments)     Sense of doom     Sulfa Antibiotics Rash     Muscle stiffness of neck     Dapsone      Methemoglobinemia     Furosemide Other (See Comments)     Skin flushing     Metrogel [Metronidazole]      Hives diffusely on body after vaginal administration.     Azithromycin Dizziness and Rash     Cefuroxime Rash       Medications  Current Outpatient Medications   Medication Sig Dispense Refill     cetirizine (ZYRTEC) 10 MG tablet Take 1 tablet (10 mg) by mouth daily       Prenatal Vit-Fe Fumarate-FA (PRENATAL MULTIVITAMIN W/IRON) 27-0.8 MG tablet Take 1 tablet by mouth every other day       acetaminophen (TYLENOL) 325 MG tablet Take 2 tablets (650 mg) by mouth every 4  hours as needed for mild pain 100 tablet 3     ketoconazole (NIZORAL) 2 % external shampoo Apply topically three times a week Lather in the shower, wait 3-5 minutes before rinsing. 100 mL 11     lactase (LACTAID) 3000 UNIT tablet Take 3,000 Units by mouth 3 times daily (with meals)       levothyroxine (SYNTHROID/LEVOTHROID) 25 MCG tablet Take 1 tablet (25 mcg) by mouth daily 90 tablet 4     magnesium oxide (MAG-OX) 400 MG tablet Take 1 tablet (400 mg) by mouth every morning 90 tablet 3     mycophenolic acid (GENERIC EQUIVALENT) 180 MG EC tablet Take 3 tablets (540 mg) by mouth 2 times daily 180 tablet 11     norethindrone (MICRONOR) 0.35 MG tablet Take 1 tablet (0.35 mg) by mouth daily 90 tablet 3     patiromer (VELTASSA) 8.4 g packet Take 8.4 g by mouth daily as needed (as directed by your transplant team, for potassium = or > 5.5)       simethicone (MYLICON) 125 MG chewable tablet Take 125 mg by mouth daily as needed       tacrolimus (GENERIC EQUIVALENT) 1 MG capsule Take 3 capsules (3 mg) by mouth 2 times daily 180 capsule 11     tacrolimus (GENERIC) 0.5 MG capsule Take 0.5 mg by mouth 2 times daily Total dose 3mg bid (Patient not taking: Reported on 3/25/2024)       No current facility-administered medications for this visit.     Facility-Administered Medications Ordered in Other Visits   Medication Dose Route Frequency Provider Last Rate Last Admin     iopamidol (ISOVUE-250) solution 100 mL  100 mL Tube Once Trav Silva PA-C         There are no discontinued medications.        Physical Exam    No vitals available for video visit    GENERAL APPEARANCE: alert and no distress  HENT: no obvious abnormalities on appearance  RESP: breathing appears unremarkable with normal rate, no audible wheezing or cough and no apparent shortness of breath with conversation  MS: extremities normal - no gross deformities noted  SKIN: no apparent rash and normal skin tone  NEURO: speech is clear with no obvious  neurological deficits  PSYCH: mentation appears normal and affect normal       Data        Latest Ref Rng & Units 4/17/2024     9:44 AM 2/29/2024     9:28 AM 2/22/2024     9:43 AM   Renal   Sodium 135 - 145 mmol/L 139  138  139    K 3.4 - 5.3 mmol/L 4.4  4.3  4.5    Cl 98 - 107 mmol/L 103  102  103    Cl (external) 98 - 107 mmol/L 103  102  103    CO2 22 - 29 mmol/L 25  26  28    Urea Nitrogen 6.0 - 20.0 mg/dL 21.4  24.3  30.4    Creatinine 0.51 - 0.95 mg/dL 1.02  1.08  1.32    Glucose 70 - 99 mg/dL 101  97  103    Calcium 8.6 - 10.0 mg/dL 9.6  9.6  9.6          Latest Ref Rng & Units 10/3/2023     9:52 AM 7/13/2023    10:15 AM 6/15/2023    10:09 AM   Bone Health   Phosphorus 2.5 - 4.5 mg/dL  3.3  3.6    Vit D Def 20 - 50 ng/mL 46            Latest Ref Rng & Units 4/17/2024     9:44 AM 2/29/2024     9:28 AM 2/22/2024     9:43 AM   Heme   WBC 4.0 - 11.0 10e3/uL 6.1  6.2  6.6    Hgb 11.7 - 15.7 g/dL 11.8  11.4  11.9    Plt 150 - 450 10e3/uL 214  226  226          Latest Ref Rng & Units 11/29/2023     9:02 AM 9/8/2023     2:34 AM 8/14/2023    12:49 PM   Liver   AP 40 - 150 U/L 65  79  112    TBili <=1.2 mg/dL 0.8  0.5  0.7    ALT 0 - 50 U/L 25  15  36    AST 0 - 45 U/L 17  19  36    Tot Protein 6.4 - 8.3 g/dL 8.1  7.2  6.3    Albumin 3.5 - 5.2 g/dL 4.6  4.1  3.2          Latest Ref Rng & Units 2/22/2024     9:43 AM 11/29/2023     9:02 AM 10/6/2023     9:41 AM   Pancreas   A1C <5.7 % 5.8   5.7    Lipase (Roche) 13 - 60 U/L  41           Latest Ref Rng & Units 10/18/2023     9:32 AM 10/3/2023     9:52 AM 7/31/2023    10:17 AM   Iron studies   Iron 37 - 145 ug/dL 56  40  94    Iron Sat Index 15 - 46 % 26  18  35    Ferritin 6 - 175 ng/mL 771  808  1,525          Latest Ref Rng & Units 7/7/2021     9:20 AM 6/2/2021     9:25 AM 5/3/2021     9:02 AM   [facility] Txp Virology   BK Spec  Plasma  Plasma  Plasma    BK Res BKNEG^BK Virus DNA Not Detected copies/mL BK Virus DNA Not Detected  BK Virus DNA Not Detected  BK Virus DNA Not  Detected    BK Log <2.7 Log copies/mL Not Calculated  Not Calculated  Not Calculated      Failed to redirect to the Timeline version of the REVFS SmartLink.  Recent Labs   Lab Test 02/22/24  0943 02/29/24  0928 04/17/24  0944   DOSTAC 2/21/2024 2/28/2024 4/16/2024   TACROL 5.7 5.8 5.4     Recent Labs   Lab Test 08/16/19  0807 09/03/19  0944   DOSMPA Not Provided 0840pm   MPACID 1.92 3.39   MPAG 149.2* 52.8         Again, thank you for allowing me to participate in the care of your patient.        Sincerely,        Gelacio Mcintyre MD

## 2024-06-13 NOTE — NURSING NOTE
Is the patient currently in the state of [place]? YES    Visit mode:VIDEO    If the visit is dropped, the patient can be reconnected by: VIDEO VISIT: Send to e-mail at: [email or web address]    Will anyone else be joining the visit? NO  (If patient encounters technical issues they should call [phone]    How would you like to obtain your AVS? MyChart    Are changes needed to the allergy or medication list? No    Are refills needed on medications prescribed by this physician? NO    Reason for visit: RECHECK    Christine CUNHA

## 2024-06-14 ENCOUNTER — TELEPHONE (OUTPATIENT)
Dept: TRANSPLANT | Facility: CLINIC | Age: 34
End: 2024-06-14
Payer: MEDICARE

## 2024-06-14 DIAGNOSIS — Z94.0 KIDNEY REPLACED BY TRANSPLANT: ICD-10-CM

## 2024-06-14 DIAGNOSIS — E79.0 HYPERURICEMIA: Primary | ICD-10-CM

## 2024-06-14 RX ORDER — PREDNISONE 20 MG/1
20 TABLET ORAL DAILY
Qty: 5 TABLET | Refills: 1 | Status: SHIPPED | OUTPATIENT
Start: 2024-06-14 | End: 2024-06-19

## 2024-06-14 NOTE — TELEPHONE ENCOUNTER
Gelacio Mcintyre MD Moody, Sarah M RN  She probably has gout. Please prescribe pred 20mg daily x5 days with 1 refill if she has another gout flare    Phone call to pt to review the above.  Prescription sent to preferred pharmacy.

## 2024-06-18 ENCOUNTER — VIRTUAL VISIT (OUTPATIENT)
Dept: ENDOCRINOLOGY | Facility: CLINIC | Age: 34
End: 2024-06-18
Payer: MEDICARE

## 2024-06-18 DIAGNOSIS — E03.9 ACQUIRED HYPOTHYROIDISM: ICD-10-CM

## 2024-06-18 DIAGNOSIS — R73.01 IMPAIRED FASTING GLUCOSE: Primary | ICD-10-CM

## 2024-06-18 PROCEDURE — 99213 OFFICE O/P EST LOW 20 MIN: CPT | Mod: 95 | Performed by: INTERNAL MEDICINE

## 2024-06-18 NOTE — PATIENT INSTRUCTIONS
Stop the levothyroxine and do labs in August 2024    Some ideas on being healthy and reducing the Hba1c    1) exercise at least 1/2-hour a day up to 1 hour/day-  Please do something you find fun and enjoyable  - walk with baby  2) do not eat after 7  3) limit your liquid intake to water, skim milk, or tea from a bag  4) eat less carbs  5) eat more raw fruits and vegetables

## 2024-06-18 NOTE — PROGRESS NOTES
"Video-Visit Details    Type of service:  Video Visit    Virtual visit conducted by Naya.      Originating Location (pt. Location): HOME    Distant Location (provider location):  Off site    Mode of Communication:  Video Conference via Tiltap    Physician has received verbal consent for a Video Visit from the patient? YES      Katrin Lopez MD     Start time 1230, stop time 1245, chart review, documentation time, coordination of care, 5 minutes.  Total time spent day of encounter 20 minutes.    6/18/24    HPI  #1 history of hypothyroidism  Report, she had a history of hypothyroidism that was diagnosed when she had \"no from her nipples\" with associated high prolactin and presumably, high TSH.  She was then placed on levothyroxine and her nipple discharge and prolactin subsequently improved. Prior to pregnancy, the patient had been on levothyroxine 25 mcg daily 4 days a week, and 37.5 mcg daily 3 days/week. June 2023 TSH 2.51. July 2023 TSH 1.32.  At the time of delivery, the patient was on levothyroxine 50 mcg daily.  However, after delivery, the patient has reduced to 25 mcg daily.   October 2023 TSH 1.57.    Interval history: Continues on levothyroxine 25 mcg daily.  She reports that her current weight is 145 (down compared to 159).  She is unsure whether she might consider in future pregnancy.     #2 status post delivery in August 2023  Reviewing her chart, this has been planned for many months, with the multidisciplinary team involved.  She reports oral contraceptive use from roughly 7460-2554.  Her oral contraceptive use was stopped in 2022 and she reports regular menses.  She is being followed by nephrology, and maternal-fetal medicine.   The patient delivered her baby in August 2023.    Interval history: She reports that she is now below her prepregnancy weight (159 prior to pregnancy, 145 now).     #3 history of prediabetes-February 2023 hemoglobin A1c at 5.2  The patient reports history of " prediabetes.  Reviewing the patient's chart, August 2019 hemoglobin A1c was 4.8, February 2020 hemoglobin A1c 5.7, in April 2020 hemoglobin A1c was 5.5. February 2023 hemoglobin A1c of 5.2. July 2023 oral glucose tolerance test unremarkable, with 3-hour post glucose (100 g load) at 117.    Interval history: Patient has a history of elevated hemoglobin A1c at 5.7 in October 2023 and 5.8 in 5 or 2024.  She reports that she has not been exercising regularly.    #4 history of renal transplant in August 2019, now with likely tertiary hyperparathyroidism, status post removal of 3.5 parathyroid glands in April 2023  The patient has a history of end-stage renal disease, due to suspected IgA nephropathy for which she was previously on dialysis and underwent recent on transplant in August 2019.    Interval history: Currently managed by nephrology.       #5 history of high prolactin  Per chart, there is a history of prolactin at 43 in April 2016, 38 in February 2017, 38 in March 2018, 51 in June 2019, 9 in November 2019, 14 and April 2020, 14 in June 2021, 20.9 November 2021, and 17 in July 2022.  2017 had MRI did not clearly show a macroadenoma, although microadenoma cannot be excluded.  No contrast was used.    Interval history: This has normalized with treatment of TFTs.  February 2024 prolactin normal at 16.    #6 history of hypercalcemia, now with likely tertiary hyperparathyroidism, status post surgery removal of 3.5 parathyroid glands in April 2023.   This is thought to due to renal related secondary hyperparathyroidism.  Had been on Sensipar, however this was stopped as she was trying to become pregnant.  Nephrology felt no need to intervene on serum calcium greater than 11.  January 2023 PTH is 79, serum calcium was 10.8. Potentially, the consideration was for Cinacalcet use with the hypercalcemia (calcium as high as 11.2).  However the patient is concerned about the effect on pregnancy.  As she is in the second  trimester, she has met with ENT .The patient is now status post removal of 3.5 parathyroid glands in April 2023.     Interval history: Subsequent calcium levels have been normal.    #7 interest in future pregnancy  The patient is potentially interested in future pregnancy.  However this is contingent on her kidney function.    Past Medical History  Past Medical History:   Diagnosis Date    Anemia in chronic kidney disease     Bacteremia 07/05/2023    Clostridium difficile colitis 8/25/2023    Difficult intubation     see 4/28/23 anesthesia notes    History of bacterial endocarditis     History of blood transfusion     History of DVT (deep vein thrombosis) 2014    History of seizure 2014    History of subarachnoid hemorrhage     Hydronephrosis     Hypothyroidism     Kidney transplanted 08/03/2019    DCD DDKT. Induction with thymo 6mg/kg.    Orthostatic hypotension     FATOUMATA (obstructive sleep apnea)     Pyelonephritis of transplanted kidney 01/23/2020    Secondary renal hyperparathyroidism (H24)     Urinary tract infection        Allergies  Allergies   Allergen Reactions    Contrast Dye Rash     CT contrast allergy 12/14/19 rash over eyes. Need to have pre medication before a CT WITH CONTRAST     Diatrizoate Rash     CT contrast allergy 12/14/19 rash over eyes. Need to have pre medication before a CT WITH CONTRAST     Lisinopril Swelling     angioedema    Nitrous Oxide Other (See Comments)     Sense of doom    Sulfa Antibiotics Rash     Muscle stiffness of neck    Dapsone      Methemoglobinemia    Furosemide Other (See Comments)     Skin flushing    Metrogel [Metronidazole]      Hives diffusely on body after vaginal administration.    Azithromycin Dizziness and Rash    Cefuroxime Rash     Medications  Current Outpatient Medications   Medication Sig Dispense Refill    acetaminophen (TYLENOL) 325 MG tablet Take 2 tablets (650 mg) by mouth every 4 hours as needed for mild pain 100 tablet 3    cetirizine (ZYRTEC) 10 MG  tablet Take 1 tablet (10 mg) by mouth daily      ketoconazole (NIZORAL) 2 % external shampoo Apply topically three times a week Lather in the shower, wait 3-5 minutes before rinsing. 100 mL 11    lactase (LACTAID) 3000 UNIT tablet Take 3,000 Units by mouth 3 times daily (with meals)      levothyroxine (SYNTHROID/LEVOTHROID) 25 MCG tablet Take 1 tablet (25 mcg) by mouth daily 90 tablet 4    magnesium oxide (MAG-OX) 400 MG tablet Take 1 tablet (400 mg) by mouth every morning 90 tablet 3    mycophenolic acid (GENERIC EQUIVALENT) 180 MG EC tablet Take 3 tablets (540 mg) by mouth 2 times daily 180 tablet 11    norethindrone (MICRONOR) 0.35 MG tablet Take 1 tablet (0.35 mg) by mouth daily 90 tablet 3    patiromer (VELTASSA) 8.4 g packet Take 8.4 g by mouth daily as needed (as directed by your transplant team, for potassium = or > 5.5)      predniSONE (DELTASONE) 20 MG tablet Take 1 tablet (20 mg) by mouth daily for 5 days For gout flare 5 tablet 1    Prenatal Vit-Fe Fumarate-FA (PRENATAL MULTIVITAMIN W/IRON) 27-0.8 MG tablet Take 1 tablet by mouth every other day      simethicone (MYLICON) 125 MG chewable tablet Take 125 mg by mouth daily as needed      tacrolimus (GENERIC EQUIVALENT) 1 MG capsule Take 3 capsules (3 mg) by mouth 2 times daily 180 capsule 11    tacrolimus (GENERIC) 0.5 MG capsule Take 0.5 mg by mouth 2 times daily Total dose 3mg bid (Patient not taking: Reported on 3/25/2024)       Family History  family history includes Cerebrovascular Disease in her sister; Coronary Artery Disease in her father; Diabetes in her sister; Heart Disease in her father; Hypertension in her sister; Kidney Disease in her mother and sister; Kidney failure in her mother; Neurologic Disorder in her father; Parkinsonism in her father; Sleep Apnea in her father.  Social History  Social History     Socioeconomic History    Marital status:      Spouse name: Jt Aleman (culturally )    Number of children: 0    Years of  education: Not on file    Highest education level: Not on file   Occupational History    Occupation: Pharmacy Technician   Tobacco Use    Smoking status: Never     Passive exposure: Never    Smokeless tobacco: Never   Vaping Use    Vaping status: Never Used   Substance and Sexual Activity    Alcohol use: Never    Drug use: Never    Sexual activity: Yes     Partners: Male   Other Topics Concern    Parent/sibling w/ CABG, MI or angioplasty before 65F 55M? Not Asked   Social History Narrative    Date of Service:5/15/2013     Name: Mona VALDOVINOS (Month, Day, Year of birth): 1990     MRN: [id number]     New Patient: Yes    Preferred Language: English     Needed: No    County of Residence: [place]    Marital Status:     Household size: 8    Number of Dependents:0     Pregnant: 0    Average Monthly Income: $ 600    Citizenship Status: Citizen    Gave Pt [employer] and [employer] applications     Social Determinants of Health     Financial Resource Strain: Low Risk  (2023)    Financial Resource Strain     Within the past 12 months, have you or your family members you live with been unable to get utilities (heat, electricity) when it was really needed?: No   Food Insecurity: Low Risk  (2023)    Food Insecurity     Within the past 12 months, did you worry that your food would run out before you got money to buy more?: No     Within the past 12 months, did the food you bought just not last and you didn t have money to get more?: No   Transportation Needs: Low Risk  (2023)    Transportation Needs     Within the past 12 months, has lack of transportation kept you from medical appointments, getting your medicines, non-medical meetings or appointments, work, or from getting things that you need?: No   Physical Activity: Not on file   Stress: Not on file   Social Connections: Not on file   Interpersonal Safety: Low Risk  (2023)    Interpersonal Safety     Do you feel physically and  "emotionally safe where you currently live?: Yes     Within the past 12 months, have you been hit, slapped, kicked or otherwise physically hurt by someone?: No     Within the past 12 months, have you been humiliated or emotionally abused in other ways by your partner or ex-partner?: No   Housing Stability: Low Risk  (11/29/2023)    Housing Stability     Do you have housing? : Yes     Are you worried about losing your housing?: No       ROS  Per HPI    Physical Exam  GENERAL: Healthy, alert and no distress\",\"EYES: Eyes grossly normal to inspection.  No discharge or erythema, or obvious scleral/conjunctival abnormalities.\",\"RESP: No audible wheeze, cough, or visible cyanosis.  No visible retractions or increased work of breathing.  \",\"SKIN: Visible skin clear. No significant rash, abnormal pigmentation or lesions.\",\"NEURO: Cranial nerves grossly intact.  Mentation and speech appropriate for age.\",\"PSYCH: Mentation appears normal, affect normal/bright, judgement and insight intact, normal speech and appearance well-groomed.    RESULTS  Lab on 06/13/2024   Component Date Value Ref Range Status    Sodium 06/13/2024 141  135 - 145 mmol/L Final    Reference intervals for this test were updated on 09/26/2023 to more accurately reflect our healthy population. There may be differences in the flagging of prior results with similar values performed with this method. Interpretation of those prior results can be made in the context of the updated reference intervals.     Potassium 06/13/2024 4.2  3.4 - 5.3 mmol/L Final    Chloride 06/13/2024 106  98 - 107 mmol/L Final    Carbon Dioxide (CO2) 06/13/2024 24  22 - 29 mmol/L Final    Anion Gap 06/13/2024 11  7 - 15 mmol/L Final    Urea Nitrogen 06/13/2024 26.2 (H)  6.0 - 20.0 mg/dL Final    Creatinine 06/13/2024 1.13 (H)  0.51 - 0.95 mg/dL Final    GFR Estimate 06/13/2024 66  >60 mL/min/1.73m2 Final    Calcium 06/13/2024 9.0  8.6 - 10.0 mg/dL Final    Glucose 06/13/2024 103 (H)  70 - " 99 mg/dL Final    WBC Count 06/13/2024 6.6  4.0 - 11.0 10e3/uL Final    RBC Count 06/13/2024 4.20  3.80 - 5.20 10e6/uL Final    Hemoglobin 06/13/2024 11.4 (L)  11.7 - 15.7 g/dL Final    Hematocrit 06/13/2024 37.2  35.0 - 47.0 % Final    MCV 06/13/2024 89  78 - 100 fL Final    MCH 06/13/2024 27.1  26.5 - 33.0 pg Final    MCHC 06/13/2024 30.6 (L)  31.5 - 36.5 g/dL Final    RDW 06/13/2024 13.1  10.0 - 15.0 % Final    Platelet Count 06/13/2024 222  150 - 450 10e3/uL Final    Tacrolimus by [employer] Mass Spectrom* 06/13/2024 5.9  5.0 - 15.0 ug/L Final    Comment: Tacrolimus Reference Range (ug/L):    Kidney Transplant:  Pediatric  0-3 months post transplant: 10-12  3-6 months post transplant: 8-10  6-12 months post transplant: 6-8  >12 months post transplant: 4-7    Adult  0-6 months post transplant: 8-10  6-12 months post transplant: 6-8  >12 months post transplant: 4-6  >5 years post transplant: 3-5    Heart Transplant:  Pediatric  0-12 months post transplant: 10-15  >12 months post transplant: 5-10    Adult  0-3 months post transplant: 10-15  3-6 months post transplant: 8-12  6-12 months post transplant: 6-12  >12 months post transplant: 6-10    Lung Transplant:  0-12 months post transplant: 10-15  >12 months post transplant: 8-12    Liver Transplant:  Pediatric  0-3 months post transplant: 10-15  3-6 months post transplant: 8-10  6 months-5 years post transplant: 6-8   >5 years post transplant: 1-3    Adult  0-3 months post transplant: 10-12  3-6 months post transplant: 8-10  >6 months post transplant: 6-8    Pancreas Transplant:  0-6                            months post transplant: 8-10  >6 months post transplant: 5-8    Tacrolimus Last Dose Date 06/13/2024 6/12/2024   Final    Tacrolimus Last Dose Time 06/13/2024 10:00 PM   Final    Uric Acid 06/13/2024 7.8 (H)  2.4 - 5.7 mg/dL Final         ASSESSMENT:    #1 history of hypothyroidism  Extensive discussion with patient.  I think we can potentially stop her  levothyroxine.  She is amendable to this.  Will have her stop her levothyroxine and recheck labs in August.  If her TSH is elevated, I will restart her levothyroxine.  I also discussed with patient that if she is considering future pregnancy, she will likely need to be on levothyroxine during pregnancy.    #2 status post delivery in August 2023  Congratulated patient.  As noted below, she is interested in the possibility of future pregnancy pending renal function.    #3 history of prediabetes-February 2023 hemoglobin A1c at 5.2  Will check labs in August 2024.  Dietary lifestyle changes recommended.    #4 history of renal transplant in August 2019, now with likely tertiary hyperparathyroidism, status post removal of 3.5 parathyroid glands in April 2023  Per nephrology.  Her renal function will preclude her possibility of future pregnancy.    #5 history of high prolactin  February 2024 prolactin levels have been normal    #6 history of hypercalcemia, now with likely tertiary hyperparathyroidism, status post surgery removal of 3.5 parathyroid glands in April 2023.   Recent calcium levels have been normal.  Congratulated patient.    #7 interest in future pregnancy  Discussed with patient she should let me know if she is considering future pregnancy (as well as her nephrologist).  If so, she will need to be on a prenatal vitamin as well as MFM consult

## 2024-06-18 NOTE — NURSING NOTE
Is the patient currently in the state of [place]? YES    Visit mode:VIDEO    If the visit is dropped, the patient can be reconnected by: VIDEO VISIT: Send to e-mail at: [email or web address]    Will anyone else be joining the visit? NO  (If patient encounters technical issues they should call [phone]    How would you like to obtain your AVS? MyChart    Are changes needed to the allergy or medication list? Pt stated no changes to allergies and Pt stated no med changes    Are refills needed on medications prescribed by this physician? NO    Reason for visit: Follow Up    Triny Duron LPN LPN

## 2024-06-18 NOTE — LETTER
"6/18/2024       RE: Mona Wagner  [address] [place] [address]     Dear Colleague,    Thank you for referring your patient, Mona Wagner, to the [facility] [place] at [facility]. Please see a copy of my visit note below.    Video-Visit Details    Type of service:  Video Visit    Virtual visit conducted by Naya.      Originating Location (pt. Location): HOME    Distant Location (provider location):  Off site    Mode of Communication:  Video Conference via [employer]    Physician has received verbal consent for a Video Visit from the patient? YES      Katrin Lopez MD     Start time 1230, stop time 1245, chart review, documentation time, coordination of care, 5 minutes.  Total time spent day of encounter 20 minutes.    6/18/24    HPI  #1 history of hypothyroidism  Report, she had a history of hypothyroidism that was diagnosed when she had \"no from her nipples\" with associated high prolactin and presumably, high TSH.  She was then placed on levothyroxine and her nipple discharge and prolactin subsequently improved. Prior to pregnancy, the patient had been on levothyroxine 25 mcg daily 4 days a week, and 37.5 mcg daily 3 days/week. June 2023 TSH 2.51. July 2023 TSH 1.32.  At the time of delivery, the patient was on levothyroxine 50 mcg daily.  However, after delivery, the patient has reduced to 25 mcg daily.   October 2023 TSH 1.57.    Interval history: Continues on levothyroxine 25 mcg daily.  She reports that her current weight is 145 (down compared to 159).  She is unsure whether she might consider in future pregnancy.     #2 status post delivery in August 2023  Reviewing her chart, this has been planned for many months, with the multidisciplinary team involved.  She reports oral contraceptive use from roughly 1091-4129.  Her oral contraceptive use was stopped in 2022 and she reports regular menses.  She is being followed by " nephrology, and maternal-fetal medicine.   The patient delivered her baby in August 2023.    Interval history: She reports that she is now below her prepregnancy weight (159 prior to pregnancy, 145 now).     #3 history of prediabetes-February 2023 hemoglobin A1c at 5.2  The patient reports history of prediabetes.  Reviewing the patient's chart, August 2019 hemoglobin A1c was 4.8, February 2020 hemoglobin A1c 5.7, in April 2020 hemoglobin A1c was 5.5. February 2023 hemoglobin A1c of 5.2. July 2023 oral glucose tolerance test unremarkable, with 3-hour post glucose (100 g load) at 117.    Interval history: Patient has a history of elevated hemoglobin A1c at 5.7 in October 2023 and 5.8 in 5 or 2024.  She reports that she has not been exercising regularly.    #4 history of renal transplant in August 2019, now with likely tertiary hyperparathyroidism, status post removal of 3.5 parathyroid glands in April 2023  The patient has a history of end-stage renal disease, due to suspected IgA nephropathy for which she was previously on dialysis and underwent recent on transplant in August 2019.    Interval history: Currently managed by nephrology.       #5 history of high prolactin  Per chart, there is a history of prolactin at 43 in April 2016, 38 in February 2017, 38 in March 2018, 51 in June 2019, 9 in November 2019, 14 and April 2020, 14 in June 2021, 20.9 November 2021, and 17 in July 2022.  2017 had MRI did not clearly show a macroadenoma, although microadenoma cannot be excluded.  No contrast was used.    Interval history: This has normalized with treatment of TFTs.  February 2024 prolactin normal at 16.    #6 history of hypercalcemia, now with likely tertiary hyperparathyroidism, status post surgery removal of 3.5 parathyroid glands in April 2023.   This is thought to due to renal related secondary hyperparathyroidism.  Had been on Sensipar, however this was stopped as she was trying to become pregnant.  Nephrology felt  no need to intervene on serum calcium greater than 11.  January 2023 PTH is 79, serum calcium was 10.8. Potentially, the consideration was for Cinacalcet use with the hypercalcemia (calcium as high as 11.2).  However the patient is concerned about the effect on pregnancy.  As she is in the second trimester, she has met with ENT .The patient is now status post removal of 3.5 parathyroid glands in April 2023.     Interval history: Subsequent calcium levels have been normal.    #7 interest in future pregnancy  The patient is potentially interested in future pregnancy.  However this is contingent on her kidney function.    Past Medical History  Past Medical History:   Diagnosis Date    Anemia in chronic kidney disease     Bacteremia 07/05/2023    Clostridium difficile colitis 8/25/2023    Difficult intubation     see 4/28/23 anesthesia notes    History of bacterial endocarditis     History of blood transfusion     History of DVT (deep vein thrombosis) 2014    History of seizure 2014    History of subarachnoid hemorrhage     Hydronephrosis     Hypothyroidism     Kidney transplanted 08/03/2019    DCD DDKT. Induction with thymo 6mg/kg.    Orthostatic hypotension     FATOUMATA (obstructive sleep apnea)     Pyelonephritis of transplanted kidney 01/23/2020    Secondary renal hyperparathyroidism (H24)     Urinary tract infection        Allergies  Allergies   Allergen Reactions    Contrast Dye Rash     CT contrast allergy 12/14/19 rash over eyes. Need to have pre medication before a CT WITH CONTRAST     Diatrizoate Rash     CT contrast allergy 12/14/19 rash over eyes. Need to have pre medication before a CT WITH CONTRAST     Lisinopril Swelling     angioedema    Nitrous Oxide Other (See Comments)     Sense of doom    Sulfa Antibiotics Rash     Muscle stiffness of neck    Dapsone      Methemoglobinemia    Furosemide Other (See Comments)     Skin flushing    Metrogel [Metronidazole]      Hives diffusely on body after vaginal  administration.    Azithromycin Dizziness and Rash    Cefuroxime Rash     Medications  Current Outpatient Medications   Medication Sig Dispense Refill    acetaminophen (TYLENOL) 325 MG tablet Take 2 tablets (650 mg) by mouth every 4 hours as needed for mild pain 100 tablet 3    cetirizine (ZYRTEC) 10 MG tablet Take 1 tablet (10 mg) by mouth daily      ketoconazole (NIZORAL) 2 % external shampoo Apply topically three times a week Lather in the shower, wait 3-5 minutes before rinsing. 100 mL 11    lactase (LACTAID) 3000 UNIT tablet Take 3,000 Units by mouth 3 times daily (with meals)      levothyroxine (SYNTHROID/LEVOTHROID) 25 MCG tablet Take 1 tablet (25 mcg) by mouth daily 90 tablet 4    magnesium oxide (MAG-OX) 400 MG tablet Take 1 tablet (400 mg) by mouth every morning 90 tablet 3    mycophenolic acid (GENERIC EQUIVALENT) 180 MG EC tablet Take 3 tablets (540 mg) by mouth 2 times daily 180 tablet 11    norethindrone (MICRONOR) 0.35 MG tablet Take 1 tablet (0.35 mg) by mouth daily 90 tablet 3    patiromer (VELTASSA) 8.4 g packet Take 8.4 g by mouth daily as needed (as directed by your transplant team, for potassium = or > 5.5)      predniSONE (DELTASONE) 20 MG tablet Take 1 tablet (20 mg) by mouth daily for 5 days For gout flare 5 tablet 1    Prenatal Vit-Fe Fumarate-FA (PRENATAL MULTIVITAMIN W/IRON) 27-0.8 MG tablet Take 1 tablet by mouth every other day      simethicone (MYLICON) 125 MG chewable tablet Take 125 mg by mouth daily as needed      tacrolimus (GENERIC EQUIVALENT) 1 MG capsule Take 3 capsules (3 mg) by mouth 2 times daily 180 capsule 11    tacrolimus (GENERIC) 0.5 MG capsule Take 0.5 mg by mouth 2 times daily Total dose 3mg bid (Patient not taking: Reported on 3/25/2024)       Family History  family history includes Cerebrovascular Disease in her sister; Coronary Artery Disease in her father; Diabetes in her sister; Heart Disease in her father; Hypertension in her sister; Kidney Disease in her mother  and sister; Kidney failure in her mother; Neurologic Disorder in her father; Parkinsonism in her father; Sleep Apnea in her father.  Social History  Social History     Socioeconomic History    Marital status:      Spouse name: Jt Aleman (culturally )    Number of children: 0    Years of education: Not on file    Highest education level: Not on file   Occupational History    Occupation: Pharmacy Technician   Tobacco Use    Smoking status: Never     Passive exposure: Never    Smokeless tobacco: Never   Vaping Use    Vaping status: Never Used   Substance and Sexual Activity    Alcohol use: Never    Drug use: Never    Sexual activity: Yes     Partners: Male   Other Topics Concern    Parent/sibling w/ CABG, MI or angioplasty before 65F 55M? Not Asked   Social History Narrative    Date of Service:5/15/2013     Name: Mona VALDOVINOS (Month, Day, Year of birth): 1990     MRN: [id number]     New Patient: Yes    Preferred Language: English     Needed: No    County of Residence: [place]    Marital Status:     Household size: 8    Number of Dependents:0     Pregnant: 0    Average Monthly Income: $ 600    Citizenship Status: Citizen    Gave Pt [employer] and [employer] applications     Social Determinants of Health     Financial Resource Strain: Low Risk  (2023)    Financial Resource Strain     Within the past 12 months, have you or your family members you live with been unable to get utilities (heat, electricity) when it was really needed?: No   Food Insecurity: Low Risk  (2023)    Food Insecurity     Within the past 12 months, did you worry that your food would run out before you got money to buy more?: No     Within the past 12 months, did the food you bought just not last and you didn t have money to get more?: No   Transportation Needs: Low Risk  (2023)    Transportation Needs     Within the past 12 months, has lack of transportation kept you from medical appointments,  "getting your medicines, non-medical meetings or appointments, work, or from getting things that you need?: No   Physical Activity: Not on file   Stress: Not on file   Social Connections: Not on file   Interpersonal Safety: Low Risk  (11/29/2023)    Interpersonal Safety     Do you feel physically and emotionally safe where you currently live?: Yes     Within the past 12 months, have you been hit, slapped, kicked or otherwise physically hurt by someone?: No     Within the past 12 months, have you been humiliated or emotionally abused in other ways by your partner or ex-partner?: No   Housing Stability: Low Risk  (11/29/2023)    Housing Stability     Do you have housing? : Yes     Are you worried about losing your housing?: No       ROS  Per HPI    Physical Exam  GENERAL: Healthy, alert and no distress\",\"EYES: Eyes grossly normal to inspection.  No discharge or erythema, or obvious scleral/conjunctival abnormalities.\",\"RESP: No audible wheeze, cough, or visible cyanosis.  No visible retractions or increased work of breathing.  \",\"SKIN: Visible skin clear. No significant rash, abnormal pigmentation or lesions.\",\"NEURO: Cranial nerves grossly intact.  Mentation and speech appropriate for age.\",\"PSYCH: Mentation appears normal, affect normal/bright, judgement and insight intact, normal speech and appearance well-groomed.    RESULTS  Lab on 06/13/2024   Component Date Value Ref Range Status    Sodium 06/13/2024 141  135 - 145 mmol/L Final    Reference intervals for this test were updated on 09/26/2023 to more accurately reflect our healthy population. There may be differences in the flagging of prior results with similar values performed with this method. Interpretation of those prior results can be made in the context of the updated reference intervals.     Potassium 06/13/2024 4.2  3.4 - 5.3 mmol/L Final    Chloride 06/13/2024 106  98 - 107 mmol/L Final    Carbon Dioxide (CO2) 06/13/2024 24  22 - 29 mmol/L Final    Anion " Gap 06/13/2024 11  7 - 15 mmol/L Final    Urea Nitrogen 06/13/2024 26.2 (H)  6.0 - 20.0 mg/dL Final    Creatinine 06/13/2024 1.13 (H)  0.51 - 0.95 mg/dL Final    GFR Estimate 06/13/2024 66  >60 mL/min/1.73m2 Final    Calcium 06/13/2024 9.0  8.6 - 10.0 mg/dL Final    Glucose 06/13/2024 103 (H)  70 - 99 mg/dL Final    WBC Count 06/13/2024 6.6  4.0 - 11.0 10e3/uL Final    RBC Count 06/13/2024 4.20  3.80 - 5.20 10e6/uL Final    Hemoglobin 06/13/2024 11.4 (L)  11.7 - 15.7 g/dL Final    Hematocrit 06/13/2024 37.2  35.0 - 47.0 % Final    MCV 06/13/2024 89  78 - 100 fL Final    MCH 06/13/2024 27.1  26.5 - 33.0 pg Final    MCHC 06/13/2024 30.6 (L)  31.5 - 36.5 g/dL Final    RDW 06/13/2024 13.1  10.0 - 15.0 % Final    Platelet Count 06/13/2024 222  150 - 450 10e3/uL Final    Tacrolimus by Tandem Mass Spectrom* 06/13/2024 5.9  5.0 - 15.0 ug/L Final    Comment: Tacrolimus Reference Range (ug/L):    Kidney Transplant:  Pediatric  0-3 months post transplant: 10-12  3-6 months post transplant: 8-10  6-12 months post transplant: 6-8  >12 months post transplant: 4-7    Adult  0-6 months post transplant: 8-10  6-12 months post transplant: 6-8  >12 months post transplant: 4-6  >5 years post transplant: 3-5    Heart Transplant:  Pediatric  0-12 months post transplant: 10-15  >12 months post transplant: 5-10    Adult  0-3 months post transplant: 10-15  3-6 months post transplant: 8-12  6-12 months post transplant: 6-12  >12 months post transplant: 6-10    Lung Transplant:  0-12 months post transplant: 10-15  >12 months post transplant: 8-12    Liver Transplant:  Pediatric  0-3 months post transplant: 10-15  3-6 months post transplant: 8-10  6 months-5 years post transplant: 6-8   >5 years post transplant: 1-3    Adult  0-3 months post transplant: 10-12  3-6 months post transplant: 8-10  >6 months post transplant: 6-8    Pancreas Transplant:  0-6                            months post transplant: 8-10  >6 months post transplant: 5-8     Tacrolimus Last Dose Date 06/13/2024 6/12/2024   Final    Tacrolimus Last Dose Time 06/13/2024 10:00 PM   Final    Uric Acid 06/13/2024 7.8 (H)  2.4 - 5.7 mg/dL Final         ASSESSMENT:    #1 history of hypothyroidism  Extensive discussion with patient.  I think we can potentially stop her levothyroxine.  She is amendable to this.  Will have her stop her levothyroxine and recheck labs in August.  If her TSH is elevated, I will restart her levothyroxine.  I also discussed with patient that if she is considering future pregnancy, she will likely need to be on levothyroxine during pregnancy.    #2 status post delivery in August 2023  Congratulated patient.  As noted below, she is interested in the possibility of future pregnancy pending renal function.    #3 history of prediabetes-February 2023 hemoglobin A1c at 5.2  Will check labs in August 2024.  Dietary lifestyle changes recommended.    #4 history of renal transplant in August 2019, now with likely tertiary hyperparathyroidism, status post removal of 3.5 parathyroid glands in April 2023  Per nephrology.  Her renal function will preclude her possibility of future pregnancy.    #5 history of high prolactin  February 2024 prolactin levels have been normal    #6 history of hypercalcemia, now with likely tertiary hyperparathyroidism, status post surgery removal of 3.5 parathyroid glands in April 2023.   Recent calcium levels have been normal.  Congratulated patient.    #7 interest in future pregnancy  Discussed with patient she should let me know if she is considering future pregnancy (as well as her nephrologist).  If so, she will need to be on a prenatal vitamin as well as MFM consult      Again, thank you for allowing me to participate in the care of your patient.      Sincerely,    Katrin Lopez MD

## 2024-06-20 ENCOUNTER — MYC REFILL (OUTPATIENT)
Dept: FAMILY MEDICINE | Facility: CLINIC | Age: 34
End: 2024-06-20
Payer: MEDICARE

## 2024-06-20 DIAGNOSIS — N92.6 MISSED PERIOD: Primary | ICD-10-CM

## 2024-06-25 RX ORDER — PRENATAL VIT/IRON FUM/FOLIC AC 27MG-0.8MG
1 TABLET ORAL EVERY OTHER DAY
Qty: 90 TABLET | Refills: 1 | Status: SHIPPED | OUTPATIENT
Start: 2024-06-25

## 2024-06-26 NOTE — NURSING NOTE
DME orders have been automatically faxed to [facility]. 2 year appointment reminder will be sent via My Chart.   Desiree Alexis CMA

## 2024-07-09 ENCOUNTER — TELEPHONE (OUTPATIENT)
Dept: ENDOCRINOLOGY | Facility: CLINIC | Age: 34
End: 2024-07-09
Payer: MEDICARE

## 2024-07-09 DIAGNOSIS — E03.9 ACQUIRED HYPOTHYROIDISM: Primary | ICD-10-CM

## 2024-07-12 ENCOUNTER — TELEPHONE (OUTPATIENT)
Dept: ENDOCRINOLOGY | Facility: CLINIC | Age: 34
End: 2024-07-12
Payer: MEDICARE

## 2024-07-12 NOTE — TELEPHONE ENCOUNTER
Sent Mychart (1st Attempt) and Patient Contacted for the patient to call back and schedule the following:    Appointment type: Return Endo  Provider: Dr. Lopez  Return date: 1 year (around June 2025)  Specialty phone number: [phone]  Additional appointment(s) needed: labs  Additonal Notes: patient said they will call back to schedule. Sent myc x1    Check Out Comments: Do labs in 2 months. Please help patient schedule   Disposition: Return in about 1 year (around 6/18/2025)

## 2024-07-17 NOTE — TELEPHONE ENCOUNTER
Patient confirmed scheduled appointment:  Date: 6/27/25  Time: 2 pm  Visit type: return endo  Provider: Dr. Lopez  Location: virtual  Testing/imaging: NA  Additional notes: patient declined scheduling lab appointment- said they would complete on their own later. Sent myc x2 with scheduling info

## 2024-07-18 ENCOUNTER — TELEPHONE (OUTPATIENT)
Dept: ENDOCRINOLOGY | Facility: CLINIC | Age: 34
End: 2024-07-18

## 2024-07-18 ENCOUNTER — LAB (OUTPATIENT)
Dept: LAB | Facility: HOSPITAL | Age: 34
End: 2024-07-18
Payer: MEDICARE

## 2024-07-18 DIAGNOSIS — E03.9 ACQUIRED HYPOTHYROIDISM: Primary | ICD-10-CM

## 2024-07-18 DIAGNOSIS — E03.9 ACQUIRED HYPOTHYROIDISM: ICD-10-CM

## 2024-07-18 LAB — TSH SERPL DL<=0.005 MIU/L-ACNC: 2.59 UIU/ML (ref 0.3–4.2)

## 2024-07-18 PROCEDURE — 84443 ASSAY THYROID STIM HORMONE: CPT

## 2024-07-18 PROCEDURE — 36415 COLL VENOUS BLD VENIPUNCTURE: CPT

## 2024-07-18 NOTE — TELEPHONE ENCOUNTER
-  Dear Mona    Here are your labs which are NORMAL - I am ok with having you staying off the levothyroxine.     If you have any questions, please feel free to contact my nurse at [phone] select option #3 for triage nurse  or  option #1 for scheduling related questions.    Regards    Katrin Lopez MD     Lab on 07/18/2024   Component Date Value Ref Range Status    TSH 07/18/2024 2.59  0.30 - 4.20 uIU/mL Final

## 2024-08-14 ENCOUNTER — LAB (OUTPATIENT)
Dept: LAB | Facility: HOSPITAL | Age: 34
End: 2024-08-14
Payer: MEDICARE

## 2024-08-14 DIAGNOSIS — Z48.298 AFTERCARE FOLLOWING ORGAN TRANSPLANT: ICD-10-CM

## 2024-08-14 DIAGNOSIS — D84.9 IMMUNOSUPPRESSED STATUS (H): ICD-10-CM

## 2024-08-14 DIAGNOSIS — Z94.0 KIDNEY REPLACED BY TRANSPLANT: ICD-10-CM

## 2024-08-14 LAB
ALBUMIN MFR UR ELPH: <6 MG/DL
ANION GAP SERPL CALCULATED.3IONS-SCNC: 13 MMOL/L (ref 7–15)
BUN SERPL-MCNC: 27.1 MG/DL (ref 6–20)
CALCIUM SERPL-MCNC: 9.2 MG/DL (ref 8.8–10.4)
CHLORIDE SERPL-SCNC: 102 MMOL/L (ref 98–107)
CREAT SERPL-MCNC: 1.04 MG/DL (ref 0.51–0.95)
CREAT UR-MCNC: 41 MG/DL
EGFRCR SERPLBLD CKD-EPI 2021: 73 ML/MIN/1.73M2
ERYTHROCYTE [DISTWIDTH] IN BLOOD BY AUTOMATED COUNT: 12.8 % (ref 10–15)
GLUCOSE SERPL-MCNC: 99 MG/DL (ref 70–99)
HCO3 SERPL-SCNC: 24 MMOL/L (ref 22–29)
HCT VFR BLD AUTO: 38.6 % (ref 35–47)
HGB BLD-MCNC: 11.8 G/DL (ref 11.7–15.7)
MCH RBC QN AUTO: 27.1 PG (ref 26.5–33)
MCHC RBC AUTO-ENTMCNC: 30.6 G/DL (ref 31.5–36.5)
MCV RBC AUTO: 89 FL (ref 78–100)
PLATELET # BLD AUTO: 209 10E3/UL (ref 150–450)
POTASSIUM SERPL-SCNC: 4.6 MMOL/L (ref 3.4–5.3)
PROT/CREAT 24H UR: NORMAL MG/G{CREAT}
RBC # BLD AUTO: 4.35 10E6/UL (ref 3.8–5.2)
SODIUM SERPL-SCNC: 139 MMOL/L (ref 135–145)
TACROLIMUS BLD-MCNC: 5.9 UG/L (ref 5–15)
TME LAST DOSE: NORMAL H
TME LAST DOSE: NORMAL H
WBC # BLD AUTO: 7.3 10E3/UL (ref 4–11)

## 2024-08-14 PROCEDURE — 80197 ASSAY OF TACROLIMUS: CPT

## 2024-08-14 PROCEDURE — 80048 BASIC METABOLIC PNL TOTAL CA: CPT

## 2024-08-14 PROCEDURE — 36415 COLL VENOUS BLD VENIPUNCTURE: CPT

## 2024-08-14 PROCEDURE — 84156 ASSAY OF PROTEIN URINE: CPT

## 2024-08-14 PROCEDURE — 85027 COMPLETE CBC AUTOMATED: CPT

## 2024-08-28 DIAGNOSIS — Z94.0 KIDNEY REPLACED BY TRANSPLANT: ICD-10-CM

## 2024-09-01 ENCOUNTER — HEALTH MAINTENANCE LETTER (OUTPATIENT)
Age: 34
End: 2024-09-01

## 2024-09-10 RX ORDER — MYCOPHENOLIC ACID 180 MG/1
540 TABLET, DELAYED RELEASE ORAL 2 TIMES DAILY
Qty: 180 TABLET | Refills: 11 | Status: SHIPPED | OUTPATIENT
Start: 2024-09-10

## 2024-09-20 ENCOUNTER — OFFICE VISIT (OUTPATIENT)
Dept: ALLERGY | Facility: CLINIC | Age: 34
End: 2024-09-20
Attending: INTERNAL MEDICINE
Payer: MEDICARE

## 2024-09-20 VITALS — BODY MASS INDEX: 29.57 KG/M2 | WEIGHT: 150.6 LBS | OXYGEN SATURATION: 99 % | HEART RATE: 78 BPM | HEIGHT: 60 IN

## 2024-09-20 DIAGNOSIS — Z88.9 MULTIPLE DRUG ALLERGIES: Primary | ICD-10-CM

## 2024-09-20 DIAGNOSIS — Z94.0 KIDNEY REPLACED BY TRANSPLANT: ICD-10-CM

## 2024-09-20 DIAGNOSIS — Z48.298 AFTERCARE FOLLOWING ORGAN TRANSPLANT: ICD-10-CM

## 2024-09-20 DIAGNOSIS — D84.9 IMMUNOSUPPRESSED STATUS (H): ICD-10-CM

## 2024-09-20 PROCEDURE — 99203 OFFICE O/P NEW LOW 30 MIN: CPT | Performed by: ALLERGY & IMMUNOLOGY

## 2024-09-20 NOTE — PATIENT INSTRUCTIONS
I will contact Dr. Mcintyre regarding sulfa    Consider challenge to the cefuroxime and zithromax +/- metronidazole

## 2024-09-20 NOTE — PROGRESS NOTES
Elsa Dunn is a 33 year old, presenting for the following health issues:  Allergy Consult    HPI     Chief complaint: Drug allergy    History of present illness: This is a pleasant 33-year-old woman that I was asked to see for evaluation of drug allergy by Dr. Mcintyre.  Patient has several antibiotic allergies listed in her profile.  First of which is cefuroxime.  This was added in 2020.  She thinks it was prescribed for UTI and she developed a nonitchy red pinpoint rash.  No blistering or skin sloughing.  Of note she has received cephalexin and ceftriaxone as well as penicillin G since that time without any symptoms.  She also states she is allergic to Zithromax.  She states with this 1 it was about 5 years ago.  She developed a similar rash to the cefuroxime.  With metronidazole she states 1 to 2 hours after using the gel she developed vaginal swelling and hives around that area.  She states it was lower abdominal hives.  She had not been on metronidazole prior to this.  She also had neck stiffness and muscle stiffness as well as a rash with sulfa.  This was entered originally in 2014.  Most recently she was hospitalized last summer when she had her baby.  She states they wanted to use self at that time but she declined due to this reaction.  No skin sloughing or mucosal lesions with this.  She does not recall if the rash was itchy.  But she states the neck stiffness was prominent and worsened with each dose.    Past medical history: History of gallbladder stones, acquired hypothyroidism, chronic kidney disease status post transplant    Social history: She works as a pharmacy technician    Family history: Noncontributory          Objective    Pulse 78   Ht 1.524 m (5')   Wt 68.3 kg (150 lb 9.6 oz)   SpO2 99%   BMI 29.41 kg/m    Body mass index is 29.41 kg/m .  Physical Exam   Gen: Pleasant female not in acute distress  HEENT: Eyes no erythema of the bulbar or palpebral conjunctiva, no edema.    Skin: No rashes  Psych: Alert and oriented times 3    Impression report and plan:  1.  Multiple antibiotic allergies    Antibiotic allergies should consist of IgE with a symptoms of hives, swelling or shortness of breath.  Her reaction to Zithromax and cefuroxime do not sound IgE-mediated.  For this reason recommend an office challenge.  Patient was somewhat wary to do this and wanted me to check with her primary nephrologist.  Unfortunately Pb felt her neck stiffness and muscle stiffness are not an IgE made reaction.  It is unclear if this would happen again.  I stated this is not a known side effect of sulfa.  Rash with sulfa is often times not IgE-mediated and can occur again.  However, induction of drug tolerance can be used in the moment when patient were to need sulfa antibiotics.  Would also recommend in office challenge to metronidazole.  Patient is aware of risk of anaphylaxis.  Signed Electronically by: Shelley WHITLEY MD

## 2024-09-20 NOTE — Clinical Note
This patient was referred to me for antibiotic allergy.  I think she is a good candidate for an office challenge to cefuroxime, Zithromax and metronidazole.  However, she is very wary to do this and worried about having a reaction.  Skin testing is not validated for these antibiotics.  However, in review of the record it looks like sulfa was the antibiotic that was most needed.  However, patient had neck stiffness and muscle pain with sulfa which is not an IgE mediated symptoms.  For this reason it could possibly occur again.  She did have a rash as well and most rashes are not an IgE mediated with sulfa.  For this reason would likely recommend induction of drug tolerance when patient does need this antibiotic.  Thank you, Shelley Stout MD

## 2024-09-20 NOTE — LETTER
9/20/2024      Mona Wagner  [address] [place] [address]      Dear Colleague,    Thank you for referring your patient, Mona Wagner, to the [facility]. Please see a copy of my visit note below.        Subjective  Mona is a 33 year old, presenting for the following health issues:  Allergy Consult    HPI     Chief complaint: Drug allergy    History of present illness: This is a pleasant 33-year-old woman that I was asked to see for evaluation of drug allergy by Dr. Mcintyre.  Patient has several antibiotic allergies listed in her profile.  First of which is cefuroxime.  This was added in 2020.  She thinks it was prescribed for UTI and she developed a nonitchy red pinpoint rash.  No blistering or skin sloughing.  Of note she has received cephalexin and ceftriaxone as well as penicillin G since that time without any symptoms.  She also states she is allergic to Zithromax.  She states with this 1 it was about 5 years ago.  She developed a similar rash to the cefuroxime.  With metronidazole she states 1 to 2 hours after using the gel she developed vaginal swelling and hives around that area.  She states it was lower abdominal hives.  She had not been on metronidazole prior to this.  She also had neck stiffness and muscle stiffness as well as a rash with sulfa.  This was entered originally in 2014.  Most recently she was hospitalized last summer when she had her baby.  She states they wanted to use self at that time but she declined due to this reaction.  No skin sloughing or mucosal lesions with this.  She does not recall if the rash was itchy.  But she states the neck stiffness was prominent and worsened with each dose.    Past medical history: History of gallbladder stones, acquired hypothyroidism, chronic kidney disease status post transplant    Social history: She works as a pharmacy technician    Family history: Noncontributory          Objective   Pulse 78   Ht 1.524 m (5')   Wt 68.3  kg (150 lb 9.6 oz)   SpO2 99%   BMI 29.41 kg/m    Body mass index is 29.41 kg/m .  Physical Exam   Gen: Pleasant female not in acute distress  HEENT: Eyes no erythema of the bulbar or palpebral conjunctiva, no edema.   Skin: No rashes  Psych: Alert and oriented times 3    Impression report and plan:  1.  Multiple antibiotic allergies    Antibiotic allergies should consist of IgE with a symptoms of hives, swelling or shortness of breath.  Her reaction to Zithromax and cefuroxime do not sound IgE-mediated.  For this reason recommend an office challenge.  Patient was somewhat wary to do this and wanted me to check with her primary nephrologist.  Unfortunately [facility] felt her neck stiffness and muscle stiffness are not an IgE made reaction.  It is unclear if this would happen again.  I stated this is not a known side effect of sulfa.  Rash with sulfa is often times not IgE-mediated and can occur again.  However, induction of drug tolerance can be used in the moment when patient were to need sulfa antibiotics.  Would also recommend in office challenge to metronidazole.  Patient is aware of risk of anaphylaxis.  Signed Electronically by: Shelley WHITLEY MD      Again, thank you for allowing me to participate in the care of your patient.        Sincerely,        Shelley WHITLEY MD

## 2024-09-27 RX ORDER — ACETAMINOPHEN AND CODEINE PHOSPHATE 120; 12 MG/5ML; MG/5ML
0.35 SOLUTION ORAL DAILY
Qty: 84 TABLET | Refills: 3 | Status: SHIPPED | OUTPATIENT
Start: 2024-09-27

## 2024-10-02 DIAGNOSIS — Z94.0 KIDNEY REPLACED BY TRANSPLANT: Primary | ICD-10-CM

## 2024-10-02 RX ORDER — SODIUM BICARBONATE 650 MG/1
1300 TABLET ORAL 3 TIMES DAILY
Qty: 180 TABLET | Refills: 11 | Status: SHIPPED | OUTPATIENT
Start: 2024-10-02

## 2024-10-16 ENCOUNTER — LAB (OUTPATIENT)
Dept: LAB | Facility: HOSPITAL | Age: 34
End: 2024-10-16
Payer: MEDICARE

## 2024-10-16 ENCOUNTER — TELEPHONE (OUTPATIENT)
Dept: ENDOCRINOLOGY | Facility: CLINIC | Age: 34
End: 2024-10-16

## 2024-10-16 DIAGNOSIS — D84.9 IMMUNOSUPPRESSED STATUS (H): ICD-10-CM

## 2024-10-16 DIAGNOSIS — Z94.0 KIDNEY TRANSPLANT RECIPIENT: ICD-10-CM

## 2024-10-16 DIAGNOSIS — Z94.0 KIDNEY REPLACED BY TRANSPLANT: ICD-10-CM

## 2024-10-16 DIAGNOSIS — E03.9 ACQUIRED HYPOTHYROIDISM: ICD-10-CM

## 2024-10-16 DIAGNOSIS — R73.01 IMPAIRED FASTING GLUCOSE: ICD-10-CM

## 2024-10-16 DIAGNOSIS — Z94.0 KIDNEY TRANSPLANT RECIPIENT: Primary | ICD-10-CM

## 2024-10-16 DIAGNOSIS — Z48.298 AFTERCARE FOLLOWING ORGAN TRANSPLANT: ICD-10-CM

## 2024-10-16 LAB
ALBUMIN UR-MCNC: NEGATIVE MG/DL
ANION GAP SERPL CALCULATED.3IONS-SCNC: 11 MMOL/L (ref 7–15)
APPEARANCE UR: CLEAR
BILIRUB UR QL STRIP: NEGATIVE
BUN SERPL-MCNC: 21.5 MG/DL (ref 6–20)
CALCIUM SERPL-MCNC: 8.8 MG/DL (ref 8.8–10.4)
CHLORIDE SERPL-SCNC: 105 MMOL/L (ref 98–107)
COLOR UR AUTO: COLORLESS
CREAT SERPL-MCNC: 1 MG/DL (ref 0.51–0.95)
EGFRCR SERPLBLD CKD-EPI 2021: 77 ML/MIN/1.73M2
ERYTHROCYTE [DISTWIDTH] IN BLOOD BY AUTOMATED COUNT: 13.2 % (ref 10–15)
EST. AVERAGE GLUCOSE BLD GHB EST-MCNC: 111 MG/DL
GLUCOSE SERPL-MCNC: 100 MG/DL (ref 70–99)
GLUCOSE UR STRIP-MCNC: NEGATIVE MG/DL
HBA1C MFR BLD: 5.5 %
HCO3 SERPL-SCNC: 22 MMOL/L (ref 22–29)
HCT VFR BLD AUTO: 37 % (ref 35–47)
HGB BLD-MCNC: 11.5 G/DL (ref 11.7–15.7)
HGB UR QL STRIP: NEGATIVE
KETONES UR STRIP-MCNC: NEGATIVE MG/DL
LEUKOCYTE ESTERASE UR QL STRIP: NEGATIVE
MCH RBC QN AUTO: 27.4 PG (ref 26.5–33)
MCHC RBC AUTO-ENTMCNC: 31.1 G/DL (ref 31.5–36.5)
MCV RBC AUTO: 88 FL (ref 78–100)
NITRATE UR QL: NEGATIVE
PH UR STRIP: 5.5 [PH] (ref 5–7)
PLATELET # BLD AUTO: 206 10E3/UL (ref 150–450)
POTASSIUM SERPL-SCNC: 4.6 MMOL/L (ref 3.4–5.3)
RBC # BLD AUTO: 4.2 10E6/UL (ref 3.8–5.2)
RBC URINE: <1 /HPF
SODIUM SERPL-SCNC: 138 MMOL/L (ref 135–145)
SP GR UR STRIP: 1.01 (ref 1–1.03)
TACROLIMUS BLD-MCNC: 6.1 UG/L (ref 5–15)
TME LAST DOSE: NORMAL H
TME LAST DOSE: NORMAL H
TSH SERPL DL<=0.005 MIU/L-ACNC: 3.22 UIU/ML (ref 0.3–4.2)
UROBILINOGEN UR STRIP-MCNC: <2 MG/DL
WBC # BLD AUTO: 7 10E3/UL (ref 4–11)
WBC URINE: <1 /HPF

## 2024-10-16 PROCEDURE — 36415 COLL VENOUS BLD VENIPUNCTURE: CPT

## 2024-10-16 ASSESSMENT — ENCOUNTER SYMPTOMS: NEW SYMPTOMS OF CORONARY ARTERY DISEASE: 0

## 2024-10-16 NOTE — TELEPHONE ENCOUNTER
Called pt - results reviewed.     -  Dear Mona    Recent thyroid labs and hemoglobin A1c were entirely normal.  This is great news.  I am okay with you staying off the levothyroxine.  Will see you in June 2025.    If you have any questions, please feel free to contact my nurse at [phone] select option #3 for triage nurse  or  option #1 for scheduling related questions.    Regards    Resulted Orders   Hemoglobin A1c   Result Value Ref Range    Estimated Average Glucose 111 <117 mg/dL    Hemoglobin A1C 5.5 <5.7 %      Comment:      Normal <5.7%   Prediabetes 5.7-6.4%    Diabetes 6.5% or higher     Note: Adopted from ADA consensus guidelines.   TSH with free T4 reflex   Result Value Ref Range    TSH 3.22 0.30 - 4.20 uIU/mL

## 2024-10-17 DIAGNOSIS — N18.31 STAGE 3A CHRONIC KIDNEY DISEASE (H): ICD-10-CM

## 2024-10-17 DIAGNOSIS — M54.9 BACK PAIN, UNSPECIFIED BACK LOCATION, UNSPECIFIED BACK PAIN LATERALITY, UNSPECIFIED CHRONICITY: ICD-10-CM

## 2024-10-17 DIAGNOSIS — E55.9 HYPOVITAMINOSIS D: ICD-10-CM

## 2024-10-21 RX ORDER — CHOLECALCIFEROL (VITAMIN D3) 50 MCG
1 TABLET ORAL EVERY MORNING
Qty: 90 TABLET | Refills: 3 | Status: SHIPPED | OUTPATIENT
Start: 2024-10-21

## 2024-10-21 RX ORDER — LANOLIN ALCOHOL/MO/W.PET/CERES
CREAM (GRAM) TOPICAL
Qty: 90 TABLET | Refills: 3 | Status: SHIPPED | OUTPATIENT
Start: 2024-10-21

## 2024-10-21 RX ORDER — ACETAMINOPHEN 325 MG/1
TABLET ORAL
Qty: 100 TABLET | Refills: 3 | Status: SHIPPED | OUTPATIENT
Start: 2024-10-21

## 2024-10-29 ENCOUNTER — MYC REFILL (OUTPATIENT)
Dept: TRANSPLANT | Facility: CLINIC | Age: 34
End: 2024-10-29
Payer: MEDICARE

## 2024-10-29 DIAGNOSIS — Z94.0 KIDNEY REPLACED BY TRANSPLANT: ICD-10-CM

## 2024-10-30 RX ORDER — TACROLIMUS 1 MG/1
3 CAPSULE ORAL 2 TIMES DAILY
Qty: 180 CAPSULE | Refills: 11 | Status: SHIPPED | OUTPATIENT
Start: 2024-10-30

## 2025-02-05 SDOH — HEALTH STABILITY: PHYSICAL HEALTH: ON AVERAGE, HOW MANY MINUTES DO YOU ENGAGE IN EXERCISE AT THIS LEVEL?: 30 MIN

## 2025-02-05 SDOH — HEALTH STABILITY: PHYSICAL HEALTH: ON AVERAGE, HOW MANY DAYS PER WEEK DO YOU ENGAGE IN MODERATE TO STRENUOUS EXERCISE (LIKE A BRISK WALK)?: 3 DAYS

## 2025-02-05 ASSESSMENT — SOCIAL DETERMINANTS OF HEALTH (SDOH): HOW OFTEN DO YOU GET TOGETHER WITH FRIENDS OR RELATIVES?: ONCE A WEEK

## 2025-02-09 ENCOUNTER — OFFICE VISIT (OUTPATIENT)
Dept: URGENT CARE | Facility: URGENT CARE | Age: 35
End: 2025-02-09
Payer: MEDICARE

## 2025-02-09 VITALS
OXYGEN SATURATION: 98 % | WEIGHT: 155 LBS | DIASTOLIC BLOOD PRESSURE: 74 MMHG | BODY MASS INDEX: 30.27 KG/M2 | SYSTOLIC BLOOD PRESSURE: 111 MMHG | RESPIRATION RATE: 18 BRPM | HEART RATE: 85 BPM | TEMPERATURE: 98.2 F

## 2025-02-09 DIAGNOSIS — H92.01 RIGHT EAR PAIN: Primary | ICD-10-CM

## 2025-02-09 DIAGNOSIS — M54.10 RADICULAR PAIN: ICD-10-CM

## 2025-02-09 LAB
ANION GAP SERPL CALCULATED.3IONS-SCNC: 6 MMOL/L (ref 3–14)
BUN SERPL-MCNC: 25 MG/DL (ref 7–30)
CALCIUM SERPL-MCNC: 10.1 MG/DL (ref 8.5–10.1)
CHLORIDE BLD-SCNC: 114 MMOL/L (ref 94–109)
CO2 SERPL-SCNC: 29 MMOL/L (ref 20–32)
CREAT SERPL-MCNC: 1.3 MG/DL (ref 0.52–1.04)
EGFRCR SERPLBLD CKD-EPI 2021: 56 ML/MIN/1.73M2
GLUCOSE BLD-MCNC: 123 MG/DL (ref 70–99)
POTASSIUM BLD-SCNC: 4.6 MMOL/L (ref 3.4–5.3)
SODIUM SERPL-SCNC: 149 MMOL/L (ref 135–145)

## 2025-02-09 PROCEDURE — 36415 COLL VENOUS BLD VENIPUNCTURE: CPT

## 2025-02-09 PROCEDURE — 80048 BASIC METABOLIC PNL TOTAL CA: CPT

## 2025-02-09 PROCEDURE — 99213 OFFICE O/P EST LOW 20 MIN: CPT

## 2025-02-09 ASSESSMENT — PAIN SCALES - GENERAL: PAINLEVEL_OUTOF10: SEVERE PAIN (8)

## 2025-02-09 NOTE — PROGRESS NOTES
Assessment & Plan     Right ear pain  Radicular pain  Mildly positive Spurling sign on the right with radicular pain that radiates to the right ear, most likely that this pain is secondary to a cervical muscle strain causing impingement/radicular symptoms in the corresponding dermatome.  No evidence for infection.  She has a history of a renal transplant so it is possible that there could be an electrolyte abnormality contributing to this, will evaluate with a BMP.  Low suspicion for neurological cause such as partial seizure or migraine given acuity of symptoms and lack of progression.  Instructed patient to treat this as musculoskeletal strain causing ear pain, use Tylenol and Excedrin as directed and ice and rest.  She has follow-up with her primary scheduled for Wednesday, and recommended she discuss this with them at that time.  - Basic metabolic panel  (Ca, Cl, CO2, Creat, Gluc, K, Na, BUN)       Return if symptoms worsen or fail to improve, for Follow up.    Deny Canchola [facility]     Mona is a 34 year old female who presents to clinic today for the following health issues:  Chief Complaint   Patient presents with    Ear Problem     Rt side, ear pain         2/9/2025    10:41 AM   Additional Questions   Roomed by bhavesh   Accompanied by      HPI  34-year-old female with a history of CKD s/p kidney transplant, immunosuppressed status, acquired hypothyroidism, presenting to urgent care with 1 day of right-sided shooting ear pain.    Feels that every 2 to 3 minutes that she will have a tapping/shooting radicular pain that onsets at the lateral right neck and radiates to the mid helix of the right ear.  That has been persistent since onset last night.    No fevers or chills.  No vision changes.  No swelling to the area.  No hearing changes.  No headache.  No N/V/D.  No flank pain.  Taking all medications as directed.    Of note, she has not been engaging in a  1400-calorie restricted diet and weight lifting much more since the onset of the year.        Objective    /74 (BP Location: Right arm, Patient Position: Sitting)   Pulse 85   Temp 98.2  F (36.8  C) (Oral)   Resp 18   Wt 70.3 kg (155 lb)   LMP 10/22/2024 (Exact Date)   SpO2 98%   BMI 30.27 kg/m    Physical Exam   GENERAL: alert and no distress  HENT: ear canals and TM's normal, nose and mouth without ulcers or lesions.  Ear nontender to palpation.  NECK: no adenopathy, no asymmetry, masses, or scars  RESP: lungs clear to auscultation - no rales, rhonchi or wheezes  CV: regular rate and rhythm, normal S1 S2, no S3 or S4, no murmur, click or rub, no peripheral edema  ABDOMEN: soft, nontender, no hepatosplenomegaly, no masses and bowel sounds normal  MS: no gross musculoskeletal defects noted, no edema.  She has a mildly positive Spurling sign on the right.  SKIN: no suspicious lesions or rashes.  Skin of the ears normal-appearing bilaterally without erythema.  NEURO: CN II through XII grossly intact bilaterally.  Sensation intact bilaterally.  Negative chvostek sign.  No clonus or asterixis.  No pronator drift.  Strength intact bilaterally.  No fasciculations.

## 2025-02-09 NOTE — PATIENT INSTRUCTIONS
Thank you for visiting our clinic today. As we discussed;    1) someone will reach out with your BMP results  2) make sure you follow-up with your primary care doctor on Wednesday  3) take Tylenol and/or Excedrin, apply ice to the right side of your neck and get rest  4) take a break from exercising for a day or 2, see if this helps her symptoms.  5) follow-up sooner if symptoms start to spread across her face or involve other areas.    Please feel free to call the clinic with any questions or concerns, or send a [employer] message and we will get back to you as soon as we can.

## 2025-02-10 ENCOUNTER — OFFICE VISIT (OUTPATIENT)
Dept: PEDIATRICS | Facility: CLINIC | Age: 35
End: 2025-02-10
Payer: MEDICARE

## 2025-02-10 ENCOUNTER — HOSPITAL ENCOUNTER (OUTPATIENT)
Dept: GENERAL RADIOLOGY | Facility: HOSPITAL | Age: 35
Discharge: HOME | End: 2025-02-10
Attending: FAMILY MEDICINE | Admitting: FAMILY MEDICINE
Payer: MEDICARE

## 2025-02-10 VITALS
DIASTOLIC BLOOD PRESSURE: 77 MMHG | RESPIRATION RATE: 16 BRPM | SYSTOLIC BLOOD PRESSURE: 115 MMHG | OXYGEN SATURATION: 98 % | TEMPERATURE: 98.1 F | HEART RATE: 74 BPM

## 2025-02-10 DIAGNOSIS — N18.2 CKD (CHRONIC KIDNEY DISEASE) STAGE 2, GFR 60-89 ML/MIN: ICD-10-CM

## 2025-02-10 DIAGNOSIS — E83.42 HYPOMAGNESEMIA: ICD-10-CM

## 2025-02-10 DIAGNOSIS — R51.9 ACUTE NONINTRACTABLE HEADACHE, UNSPECIFIED HEADACHE TYPE: ICD-10-CM

## 2025-02-10 DIAGNOSIS — H92.01 RIGHT EAR PAIN: ICD-10-CM

## 2025-02-10 DIAGNOSIS — M54.2 NECK PAIN: ICD-10-CM

## 2025-02-10 DIAGNOSIS — D84.9 IMMUNOSUPPRESSED STATUS: ICD-10-CM

## 2025-02-10 DIAGNOSIS — Z94.0 KIDNEY REPLACED BY TRANSPLANT: ICD-10-CM

## 2025-02-10 DIAGNOSIS — S16.1XXA STRAIN OF NECK MUSCLE, INITIAL ENCOUNTER: Primary | ICD-10-CM

## 2025-02-10 DIAGNOSIS — N28.9 ACUTE RENAL INSUFFICIENCY: ICD-10-CM

## 2025-02-10 LAB
ALBUMIN UR-MCNC: NEGATIVE MG/DL
ANION GAP SERPL CALCULATED.3IONS-SCNC: 11 MMOL/L (ref 3–14)
APPEARANCE UR: CLEAR
BACTERIA #/AREA URNS HPF: ABNORMAL /HPF
BASOPHILS # BLD AUTO: 0 10E3/UL (ref 0–0.2)
BASOPHILS NFR BLD AUTO: 1 %
BILIRUB UR QL STRIP: NEGATIVE
BUN SERPL-MCNC: 23 MG/DL (ref 7–30)
CALCIUM SERPL-MCNC: 9.2 MG/DL (ref 8.5–10.1)
CHLORIDE BLD-SCNC: 108 MMOL/L (ref 94–109)
CO2 SERPL-SCNC: 26 MMOL/L (ref 20–32)
COLOR UR AUTO: YELLOW
CREAT SERPL-MCNC: 1.1 MG/DL (ref 0.52–1.04)
EGFRCR SERPLBLD CKD-EPI 2021: 68 ML/MIN/1.73M2
EOSINOPHIL # BLD AUTO: 0.1 10E3/UL (ref 0–0.7)
EOSINOPHIL NFR BLD AUTO: 1 %
ERYTHROCYTE [DISTWIDTH] IN BLOOD BY AUTOMATED COUNT: 12.8 % (ref 10–15)
ERYTHROCYTE [SEDIMENTATION RATE] IN BLOOD BY WESTERGREN METHOD: 26 MM/HR (ref 0–20)
GLUCOSE BLD-MCNC: 101 MG/DL (ref 70–99)
GLUCOSE UR STRIP-MCNC: NEGATIVE MG/DL
HCT VFR BLD AUTO: 38.9 % (ref 35–47)
HGB BLD-MCNC: 12.5 G/DL (ref 11.7–15.7)
HGB UR QL STRIP: ABNORMAL
IMM GRANULOCYTES # BLD: 0 10E3/UL
IMM GRANULOCYTES NFR BLD: 0 %
KETONES UR STRIP-MCNC: NEGATIVE MG/DL
LEUKOCYTE ESTERASE UR QL STRIP: NEGATIVE
LYMPHOCYTES # BLD AUTO: 1.1 10E3/UL (ref 0.8–5.3)
LYMPHOCYTES NFR BLD AUTO: 15 %
MAGNESIUM SERPL-MCNC: 1.6 MG/DL (ref 1.7–2.3)
MCH RBC QN AUTO: 27.9 PG (ref 26.5–33)
MCHC RBC AUTO-ENTMCNC: 32.1 G/DL (ref 31.5–36.5)
MCV RBC AUTO: 87 FL (ref 78–100)
MONOCYTES # BLD AUTO: 0.4 10E3/UL (ref 0–1.3)
MONOCYTES NFR BLD AUTO: 5 %
NEUTROPHILS # BLD AUTO: 5.3 10E3/UL (ref 1.6–8.3)
NEUTROPHILS NFR BLD AUTO: 78 %
NITRATE UR QL: NEGATIVE
PH UR STRIP: 5.5 [PH] (ref 5–8)
PLATELET # BLD AUTO: 220 10E3/UL (ref 150–450)
POTASSIUM BLD-SCNC: 4.9 MMOL/L (ref 3.4–5.3)
RBC # BLD AUTO: 4.48 10E6/UL (ref 3.8–5.2)
RBC #/AREA URNS AUTO: ABNORMAL /HPF
SODIUM SERPL-SCNC: 145 MMOL/L (ref 135–145)
SP GR UR STRIP: 1.01 (ref 1–1.03)
SQUAMOUS #/AREA URNS AUTO: ABNORMAL /LPF
UROBILINOGEN UR STRIP-ACNC: 0.2 E.U./DL
WBC # BLD AUTO: 6.9 10E3/UL (ref 4–11)
WBC #/AREA URNS AUTO: ABNORMAL /HPF

## 2025-02-10 PROCEDURE — 81001 URINALYSIS AUTO W/SCOPE: CPT | Performed by: FAMILY MEDICINE

## 2025-02-10 PROCEDURE — 36415 COLL VENOUS BLD VENIPUNCTURE: CPT | Performed by: FAMILY MEDICINE

## 2025-02-10 PROCEDURE — 83735 ASSAY OF MAGNESIUM: CPT | Performed by: FAMILY MEDICINE

## 2025-02-10 PROCEDURE — 72040 X-RAY EXAM NECK SPINE 2-3 VW: CPT

## 2025-02-10 PROCEDURE — 99215 OFFICE O/P EST HI 40 MIN: CPT | Mod: 25 | Performed by: FAMILY MEDICINE

## 2025-02-10 PROCEDURE — 85652 RBC SED RATE AUTOMATED: CPT | Performed by: FAMILY MEDICINE

## 2025-02-10 PROCEDURE — 80048 BASIC METABOLIC PNL TOTAL CA: CPT | Performed by: FAMILY MEDICINE

## 2025-02-10 PROCEDURE — 99417 PROLNG OP E/M EACH 15 MIN: CPT | Performed by: FAMILY MEDICINE

## 2025-02-10 PROCEDURE — 85025 COMPLETE CBC W/AUTO DIFF WBC: CPT | Performed by: FAMILY MEDICINE

## 2025-02-10 PROCEDURE — 96360 HYDRATION IV INFUSION INIT: CPT | Performed by: FAMILY MEDICINE

## 2025-02-10 RX ORDER — CYCLOBENZAPRINE HCL 5 MG
5 TABLET ORAL 3 TIMES DAILY PRN
Qty: 12 TABLET | Refills: 0 | Status: SHIPPED | OUTPATIENT
Start: 2025-02-10

## 2025-02-10 RX ADMIN — Medication 1000 ML: at 11:44

## 2025-02-10 ASSESSMENT — PAIN SCALES - GENERAL: PAINLEVEL_OUTOF10: SEVERE PAIN (7)

## 2025-02-10 NOTE — PATIENT INSTRUCTIONS
Warm or cold packs as needed.  Warm packs will likely be more beneficial but it is okay to try both.  Acetaminophen as needed  Gentle stretching  You may use the cyclobenzaprine muscle relaxer every 8 hours as needed.  Do not drive while taking this medication as it can make you drowsy.  Do not cosleep with your baby.  Rest a bit from your lifting or use lighter weights until you are feeling better.    For the low magnesium - you can take oral supplements like magnesium glycinate up to 400mg daily.  Most people prefer to take it at bedtime.    Follow-up for recheck if you are seeing any worsening symptoms or if you are not getting better in 7 to 10 days.

## 2025-02-10 NOTE — Clinical Note
Thank you for this referral to [facility]! For future after hours referrals please remember to send a message to the [facility] support staff pool and/or provider pool to alert us of the after-hours referral and have the patient wait at home for a phone call for scheduling.  You can always have them call the clinic by 10 AM if they have not heard from us.  Thank you!

## 2025-02-10 NOTE — PROGRESS NOTES
Acute [facility] Diagnostic Services Clinic Visit    Assessment & Plan     Strain of neck muscle, initial encounter  Recent heavy lifting with right sided trapezius soreness and some shooting nerve pain in the same area.  The nerve pain is not in the area of the trigeminal nerve.  Will try cyclobenzaprine every 8 hours as needed  Recommended:  Warm or cold packs as needed.  Warm packs will likely be more beneficial but it is okay to try both.  Acetaminophen as needed  Gentle stretching  You may use the cyclobenzaprine muscle relaxer every 8 hours as needed.  Do not drive while taking this medication as it can make you drowsy.  Do not cosleep with your baby.  Rest a bit from your lifting or use lighter weights until you are feeling better.  Follow-up for recheck if you are seeing any worsening symptoms or if you are not getting better in 7 to 10 days.  - cyclobenzaprine (FLEXERIL) 5 MG tablet; Take 1 tablet (5 mg) by mouth 3 times daily as needed for muscle spasms.    Acute renal insufficiency  Patient given 1 L IV normal saline here in clinic for the acute on chronic renal insufficiency  Patient reports feeling improvement after fluids  - Basic metabolic panel; Future  - UA Macroscopic with reflex to Microscopic and Culture - Clinic Collect  - Magnesium; Future  - sodium chloride (PF) 0.9% PF flush 3 mL  - Magnesium  - Basic metabolic panel  - UA Microscopic with Reflex to Culture  - sodium chloride 0.9% BOLUS 1,000 mL    Right ear pain  Continue Excedrin as needed  Follow-up for recheck if you are seeing any worsening symptoms or if you are not getting better in 7 to 10 days.  - CBC with platelets differential; Future  - Erythrocyte sedimentation rate auto; Future  - XR Cervical Spine 2/3 Views; Future  - XR Cervical Spine 2/3 Views  - Erythrocyte sedimentation rate auto  - CBC with platelets differential    Acute nonintractable headache, unspecified headache type  Improvement with fluids  Continue Excedrin as needed  -  "Erythrocyte sedimentation rate auto; Future  - UA Macroscopic with reflex to Microscopic and Culture - Clinic Collect  - Magnesium; Future  - Magnesium  - Erythrocyte sedimentation rate auto  - UA Microscopic with Reflex to Culture    Neck pain  See above under neck muscle strain  - XR Cervical Spine 2/3 Views; Future  - XR Cervical Spine 2/3 Views    Immunosuppressed status  Status post kidney transplant    CKD (chronic kidney disease) stage 2, GFR 60-89 ml/min  Acute renal insufficiency which has shown some improvement over the last 24 hours with oral fluids  See above    Kidney replaced by transplant  6 years ago    Hypomagnesemia  For the low magnesium - you can take oral supplements like magnesium glycinate up to 400mg daily.  Most people prefer to take it at bedtime.    > 70 minutes were spent doing chart review, history and exam, documentation and further activities per the note.       BMI  Estimated body mass index is 30.27 kg/m  as calculated from the following:    Height as of 9/20/24: 1.524 m (5').    Weight as of 2/9/25: 70.3 kg (155 lb).   Weight management plan: Not addressed at today's visit          No follow-ups on file.    Subjective   Mona is a 34 year old, presenting for the following health issues:  Otalgia    HPI     Acute Illness  Acute illness concerns: right ear pain  Onset/Duration: 2/8/25  Symptoms:  Fever: No  Chills/Sweats: No  Headache (location?): YES- \"from ear pain a dull headache\"  Sinus Pressure: No           Decreased energy level: YES  Conjunctivitis:  No  Ear Pain: YES: right  Rhinorrhea: No  Congestion: No  Sore Throat: No  Cough: no  Wheeze: No           Breathing fast: No  Decreased Appetite/Intake: No  Nausea: No  Vomiting: No  Diarrhea: No  Genitourinary symptoms: No  Progression of symptoms: same intermittent  Sick/Strep Exposure: No  Therapies tried and outcome: Excedrin and tylenol- takes pain away 80%    Patient is a 34-year-old female status post kidney transplant 6 " years ago who complains of shock type of pain lasting 5 to 10 seconds in her right ear since Saturday morning, 2 days ago.  It was happening every 30 seconds.  It is a 7-8 out of 10 pain.  When she takes Excedrin it happens every 10 minutes or longer.  The right side of her neck is also sore.  On Friday she did increase her kettle bell lifting from 10 pounds up to 20 pounds and thinks she may have overdone it.  She does have a bit of a bilateral posterior headache and a right temple headache.  She is right-handed  She did have something stuck in her tooth on Friday that she was able to get out with her tongue.  She has no tooth pain.  She denies fevers, chills, sore throat, tooth pain.  No lightheadedness or dizziness.  No change in vision or speech.  She was not drinking as much fluids over the weekend.  She was seen in urgent care over the weekend and her basic metabolic panel was drawn and she was noted to have some acute renal insufficiency and so was referred to [facility] today for recheck.    Review of Systems  negative except listed in HPI       Objective    /77 (BP Location: Right arm, Patient Position: Sitting, Cuff Size: Adult Regular)   Pulse 74   Temp 98.1  F (36.7  C) (Oral)   Resp 16   LMP 10/22/2024 (Exact Date)   SpO2 98%   There is no height or weight on file to calculate BMI.  Physical Exam   Vitals are noted and within normal limits  In general she is alert, oriented, and in no acute distress  Head is normocephalic and atraumatic  Eyes: EOMI, PERRL.  Conjunctive not injected.  Wears glasses.  Ears: Hearing intact.  Canals patent, TMs intact with no erythema no bulging.  Right auricle with no swelling or erythema.  No tenderness to the touch.  Mouth mucous members are pink and moist.  Palate elevates bilaterally and tongue protrudes symmetrically.  Symmetric smile.  Sensation intact all 3 branches of the trigeminal nerve bilaterally  Neck is supple with no cervical lymphadenopathy.  Full range  of motion of neck.  She feels some tenderness at the right trap when she does left ear to left shoulder and when she looks down.  No tenderness to palpation of the cervical spine, bilateral paraspinal muscles or trapezius bilaterally.  Heart regular rate and rhythm with 2 out of 6 systolic murmur heard best over the right upper sternal border  Lungs are clear to auscultation bilaterally with good air entry  Radial pulses and posterior tibial pulses are normal and equal bilaterally  Strength is 5-5 and equal bilaterally upper and lower extremities  Finger-nose-finger is normal  Rapid alternating movements are normal  Patient walks with a normal gait.  She can heel walk, toe walk, as well as walk heel-to-toe.  Romberg negative  BMP shows improved sodium and chloride back to normal.  Still with some mild renal insufficiency the which is much closer to her baseline  CBC is within normal limits  Sed rate is very mildly elevated at 26  UA has trace blood on macro and 2-5 red cells per high-power field on microscopic.  No sign of infection.  Magnesium: Mildly low at 1.6  Cervical spine x-ray with no acute abnormalities  Results for orders placed or performed in visit on 02/10/25   XR Cervical Spine 2/3 Views     Status: None    Narrative    EXAM: XR CERVICAL SPINE 2/3 VIEWS  LOCATION: [facility]  DATE: 2/10/2025    INDICATION:  Right ear pain, Neck pain  COMPARISON: None.      Impression    IMPRESSION: Preserved vertebral heights. Mild convex left lateral curvature. Relative straightening of the lateral vertebral body alignment. Normal disc spaces and facets for age. Normal extraspinal structures.       UA Macroscopic with reflex to Microscopic and Culture - Clinic Collect     Status: Abnormal    Specimen: Urine, Clean Catch   Result Value Ref Range    Color Urine Yellow Colorless, Straw, Light Yellow, Yellow    Appearance Urine Clear Clear    Glucose Urine Negative Negative mg/dL    Bilirubin  Urine Negative Negative    Ketones Urine Negative Negative mg/dL    Specific Gravity Urine 1.010 1.005 - 1.030    Blood Urine Trace (A) Negative    pH Urine 5.5 5.0 - 8.0    Protein Albumin Urine Negative Negative mg/dL    Urobilinogen Urine 0.2 0.2, 1.0 E.U./dL    Nitrite Urine Negative Negative    Leukocyte Esterase Urine Negative Negative   Magnesium     Status: Abnormal   Result Value Ref Range    Magnesium 1.6 (L) 1.7 - 2.3 mg/dL   Erythrocyte sedimentation rate auto     Status: Abnormal   Result Value Ref Range    Erythrocyte Sedimentation Rate 26 (H) 0 - 20 mm/hr   Basic metabolic panel     Status: Abnormal   Result Value Ref Range    Sodium 145 135 - 145 mmol/L    Potassium 4.9 3.4 - 5.3 mmol/L    Chloride 108 94 - 109 mmol/L    Carbon Dioxide (CO2) 26 20 - 32 mmol/L    Anion Gap 11 3 - 14 mmol/L    Urea Nitrogen 23 7 - 30 mg/dL    Creatinine 1.10 (H) 0.52 - 1.04 mg/dL    GFR Estimate 68 >60 mL/min/1.73m2    Calcium 9.2 8.5 - 10.1 mg/dL    Glucose 101 (H) 70 - 99 mg/dL   CBC with platelets and differential     Status: None   Result Value Ref Range    WBC Count 6.9 4.0 - 11.0 10e3/uL    RBC Count 4.48 3.80 - 5.20 10e6/uL    Hemoglobin 12.5 11.7 - 15.7 g/dL    Hematocrit 38.9 35.0 - 47.0 %    MCV 87 78 - 100 fL    MCH 27.9 26.5 - 33.0 pg    MCHC 32.1 31.5 - 36.5 g/dL    RDW 12.8 10.0 - 15.0 %    Platelet Count 220 150 - 450 10e3/uL    % Neutrophils 78 %    % Lymphocytes 15 %    % Monocytes 5 %    % Eosinophils 1 %    % Basophils 1 %    % Immature Granulocytes 0 %    Absolute Neutrophils 5.3 1.6 - 8.3 10e3/uL    Absolute Lymphocytes 1.1 0.8 - 5.3 10e3/uL    Absolute Monocytes 0.4 0.0 - 1.3 10e3/uL    Absolute Eosinophils 0.1 0.0 - 0.7 10e3/uL    Absolute Basophils 0.0 0.0 - 0.2 10e3/uL    Absolute Immature Granulocytes 0.0 <=0.4 10e3/uL   UA Microscopic with Reflex to Culture     Status: Abnormal   Result Value Ref Range    Bacteria Urine Few (A) None Seen /HPF    RBC Urine 2-5 (A) 0-2 /HPF /HPF    WBC Urine  0-5 0-5 /HPF /HPF    Squamous Epithelials Urine Few (A) None Seen /LPF    Narrative    Urine Culture not indicated   CBC with platelets differential     Status: None    Narrative    The following orders were created for panel order CBC with platelets differential.  Procedure                               Abnormality         Status                     ---------                               -----------         ------                     CBC with platelets and d...[[id number]]                      Final result                 Please view results for these tests on the individual orders.                 Signed Electronically by: Soha Britt MD

## 2025-02-12 ENCOUNTER — OFFICE VISIT (OUTPATIENT)
Dept: FAMILY MEDICINE | Facility: CLINIC | Age: 35
End: 2025-02-12
Payer: MEDICARE

## 2025-02-12 VITALS
TEMPERATURE: 98.6 F | HEART RATE: 76 BPM | DIASTOLIC BLOOD PRESSURE: 78 MMHG | RESPIRATION RATE: 20 BRPM | OXYGEN SATURATION: 100 % | HEIGHT: 60 IN | WEIGHT: 155 LBS | BODY MASS INDEX: 30.43 KG/M2 | SYSTOLIC BLOOD PRESSURE: 115 MMHG

## 2025-02-12 DIAGNOSIS — S46.811A TRAPEZIUS STRAIN, RIGHT, INITIAL ENCOUNTER: ICD-10-CM

## 2025-02-12 DIAGNOSIS — Z94.0 KIDNEY REPLACED BY TRANSPLANT: ICD-10-CM

## 2025-02-12 DIAGNOSIS — D84.9 IMMUNOSUPPRESSED STATUS: ICD-10-CM

## 2025-02-12 DIAGNOSIS — N18.2 CKD (CHRONIC KIDNEY DISEASE) STAGE 2, GFR 60-89 ML/MIN: Primary | ICD-10-CM

## 2025-02-12 NOTE — PROGRESS NOTES
Assessment & Plan     Immunosuppressed status  Kidney replaced by transplant  CKD (chronic kidney disease) stage 2, GFR 60-89 ml/min-labs released today.  Kidney function is recovering.  Will recheck today.  Continue with current transplant team and nephrology.  - Lipid Profile; Future    Desire for second pregnancy.  Patient is currently considering this as an option.  Encouraged her to continue discussions with her care team including OB nephrology and her transplant team.      Trapezius strain, right, initial encounter-patient with evidence of trapezius strain on exam today.  I suggested OMT in clinic for the patient.  We have openings tomorrow and she would like to try this.        Subjective   Mona is a 34 year old, presenting for the following health issues:  RECHECK (Left ear pain)      2/12/2025     9:38 AM   Additional Questions   Roomed by Haritha   Accompanied by BRIAN         2/12/2025    Information    services provided? No     HPI     Patient presents for general follow up today.     Has some lab work that she needs through the kidney doctor.  Would like done today    Right ear pain.  Patient has been working out at the gym and working with kettle balls.  Recently increased from 10 pounds to 20 pounds.  After this began having pain in the neck and ear.  Went to urgent care a few times for this and has been told it is a muscular strain.  She was given muscle relaxers which did not help significantly with the pain and she continues to have trouble with this.  Tylenol is also not helping.  She is wondering if anything else can be done.    History of renal transplant.  Needs labs as above.  Of note patient is also considering another pregnancy.  Last pregnancy was quite difficult for her and there was a risk of rejection with subsequent pregnancies.  Patient is in discussion about this with her care team          Objective    /78   Pulse 76   Temp 98.6  F (37  C)   Resp 20   Ht  1.524 m (5')   Wt 70.3 kg (155 lb)   LMP 10/22/2024 (Exact Date)   SpO2 100%   BMI 30.27 kg/m    Body mass index is 30.27 kg/m .  Physical Exam   GEN: NAD  HEENT: head atraumatic, normocephalic, moist mucous membranes, eyes anicteric. Ear canals wnl bilaterally  CV: RRR w/o M/R/G  PULM: CTAB  ABD: soft, non-tender, no appreciable masses, no guarding, BS present  Neuro: alert and oriented x 3, CN II-XII intact, normal gross motor movements  Psych: appropriate  Ext: no peripheral edema  MSK: + muscular spasm bilateral trapezius.     Signed Electronically by: Macarena Bowen MD

## 2025-02-13 ENCOUNTER — OFFICE VISIT (OUTPATIENT)
Dept: FAMILY MEDICINE | Facility: CLINIC | Age: 35
End: 2025-02-13
Payer: MEDICARE

## 2025-02-13 VITALS
SYSTOLIC BLOOD PRESSURE: 122 MMHG | DIASTOLIC BLOOD PRESSURE: 79 MMHG | WEIGHT: 151 LBS | TEMPERATURE: 97.9 F | RESPIRATION RATE: 16 BRPM | HEART RATE: 79 BPM | BODY MASS INDEX: 29.49 KG/M2 | OXYGEN SATURATION: 99 %

## 2025-02-13 DIAGNOSIS — M99.01 SOMATIC DYSFUNCTION OF CERVICAL REGION: ICD-10-CM

## 2025-02-13 DIAGNOSIS — S46.811A TRAPEZIUS STRAIN, RIGHT, INITIAL ENCOUNTER: Primary | ICD-10-CM

## 2025-02-13 DIAGNOSIS — Z94.0 KIDNEY REPLACED BY TRANSPLANT: ICD-10-CM

## 2025-02-13 DIAGNOSIS — Z79.899 ENCOUNTER FOR LONG-TERM CURRENT USE OF MEDICATION: ICD-10-CM

## 2025-02-13 DIAGNOSIS — Z48.298 AFTERCARE FOLLOWING ORGAN TRANSPLANT: ICD-10-CM

## 2025-02-13 DIAGNOSIS — Z20.828 CONTACT WITH AND (SUSPECTED) EXPOSURE TO OTHER VIRAL COMMUNICABLE DISEASES: ICD-10-CM

## 2025-02-13 DIAGNOSIS — E03.9 ACQUIRED HYPOTHYROIDISM: ICD-10-CM

## 2025-02-13 DIAGNOSIS — M99.00 SOMATIC DYSFUNCTION OF HEAD REGION: ICD-10-CM

## 2025-02-13 DIAGNOSIS — Z98.890 OTHER SPECIFIED POSTPROCEDURAL STATES: ICD-10-CM

## 2025-02-13 LAB
ERYTHROCYTE [DISTWIDTH] IN BLOOD BY AUTOMATED COUNT: 12.8 % (ref 10–15)
HCT VFR BLD AUTO: 38.8 % (ref 35–47)
HGB BLD-MCNC: 12.1 G/DL (ref 11.7–15.7)
MCH RBC QN AUTO: 27.8 PG (ref 26.5–33)
MCHC RBC AUTO-ENTMCNC: 31.2 G/DL (ref 31.5–36.5)
MCV RBC AUTO: 89 FL (ref 78–100)
PLATELET # BLD AUTO: 191 10E3/UL (ref 150–450)
RBC # BLD AUTO: 4.36 10E6/UL (ref 3.8–5.2)
TACROLIMUS BLD-MCNC: 6.3 UG/L (ref 5–15)
TME LAST DOSE: NORMAL H
TME LAST DOSE: NORMAL H
WBC # BLD AUTO: 5.8 10E3/UL (ref 4–11)

## 2025-02-13 NOTE — PROGRESS NOTES
Assessment & Plan     Trapezius strain, right, initial encounter  OMT to OA, C2-4 with myofascial release, muscle energy and soft tissue techniques.     Somatic dysfunction of cervical region  - OSTEOPATHIC MANIP,1-2 BODY REGN    Somatic dysfunction of head region  - OSTEOPATHIC MANIP,1-2 BODY REGN    Labs were drawn for speciality team today                  No follow-ups on file.    Subjective   Mona is a 34 year old, presenting for the following health issues:  Neck Pain (Neck/ear pain )    HPI             Right neck pain that refers to the outer ear. It hurts to the touch Right handed. Patient has been working out at the gym and working with [employer] balls.  Recently increased from 10 pounds to 20 pounds.  After this began having pain in the neck and outer ear.  Went to urgent care twice for this and has been told it is a muscular strain.  She was given muscle relaxers which did not help significantly with the pain and she continues to have trouble with this.  Tylenol is also not helping.    She woke up last night with a zap to her outer ear, very focal pain. No rash or fevers.    Neck xray  IMPRESSION: Preserved vertebral heights. Mild convex left lateral curvature. Relative straightening of the lateral vertebral body alignment. Normal disc spaces and facets for age. Normal extraspinal structures.     Discussed greater auricular nerve (C2-C3) innervates the outer ear. This is the area we will focus on for OMT.            Objective    /79   Pulse 79   Temp 97.9  F (36.6  C) (Oral)   Resp 16   Wt 68.5 kg (151 lb)   LMP 10/22/2024 (Exact Date)   SpO2 99%   BMI 29.49 kg/m    Body mass index is 29.49 kg/m .  Physical Exam   GENERAL: alert and no distress  ENT: normal appearing right ear.  MS: Fistula left forearm    Tight OA on right, limited range of motion with flexion of cervical spine  C2-C4 S-left,R-left    OMT PROCEDURE NOTE    Body Region: Head    Somatic Dysfunction #1: OA  Treatment:  Myofascial: direct , OA decompression on right  Outcome: Improved    Body Region: Cervical spine    Somatic Dysfunction #1: C2-4 Sidebent and rotated left  Treatment: muscle energy: direct   Outcome: Improved    Somatic Dysfunction #2: decreased range of motion   Treatment: Soft Tissue stretch  Outcome: Improved              Signed Electronically by: Manjula Anand DO

## 2025-02-13 NOTE — PATIENT INSTRUCTIONS
The pain you are experiencing at your ear is coming from your neck  Please do neck stretches/exercises

## 2025-02-17 ENCOUNTER — TELEPHONE (OUTPATIENT)
Dept: ENDOCRINOLOGY | Facility: CLINIC | Age: 35
End: 2025-02-17
Payer: MEDICARE

## 2025-02-17 NOTE — TELEPHONE ENCOUNTER
TSH normal    -  Dear Mona    TSH is NORMAL!    If you have any questions, please feel free to contact my nurse at [phone] select option #3 for triage nurse  or  option #1 for scheduling related questions.    Regards    Katrin Lopez MD      TSH 02/13/2025 3.52  0.30 - 4.20 uIU/mL Final

## 2025-03-11 ENCOUNTER — OFFICE VISIT (OUTPATIENT)
Dept: DERMATOLOGY | Facility: CLINIC | Age: 35
End: 2025-03-11
Attending: PHYSICIAN ASSISTANT
Payer: MEDICARE

## 2025-03-11 DIAGNOSIS — L81.4 LENTIGINES: ICD-10-CM

## 2025-03-11 DIAGNOSIS — Z12.83 ENCOUNTER FOR SCREENING FOR MALIGNANT NEOPLASM OF SKIN: Primary | ICD-10-CM

## 2025-03-11 DIAGNOSIS — L21.9 DERMATITIS, SEBORRHEIC: ICD-10-CM

## 2025-03-11 DIAGNOSIS — D22.9 MULTIPLE NEVI: ICD-10-CM

## 2025-03-11 RX ORDER — KETOCONAZOLE 20 MG/ML
SHAMPOO, SUSPENSION TOPICAL
Qty: 120 ML | Refills: 11 | Status: SHIPPED | OUTPATIENT
Start: 2025-03-12

## 2025-03-11 ASSESSMENT — PAIN SCALES - GENERAL: PAINLEVEL_OUTOF10: NO PAIN (0)

## 2025-03-11 NOTE — NURSING NOTE
Dermatology Rooming Note    Mona Wagner's goals for this visit include:   Chief Complaint   Patient presents with    Skin Check     Mona is here today for a skin check with no concerns      Braxton SANTORO CMA

## 2025-03-11 NOTE — LETTER
3/11/2025       RE: Mona Wagner  [address] [place] [address]     Dear Colleague,    Thank you for referring your patient, Mona Wagner, to the [facility] [place] at [facility]. Please see a copy of my visit note below.    [facility] Dermatology Note  Encounter Date: Mar 11, 2025  Office Visit     Reviewed patients past medical history and pertinent chart review prior to patients visit today.     Dermatology Problem List:  1. Keratosis Pilaris  - Past tx: Amlactin  2. Renal transplant for IgA nephropathy in 8/2019 c/b hydronephrosis.   - currently: myfortic and tacrolimus  - annual skin check. Last: 3/4/2024  3. Milial cyst in left labia majora  4. Non-scarring hair loss  - Rogaine deferred  5. Seborrheic dermatitis  - ketoconazole 2% shampoo  6. Right axillary nodule  - Ultrasound 07/06/2022 demonstrated lymph node with a prominent fatty hilum   - Continue to monitor     ____________________________________________    Assessment & Plan:     # Seborrheic dermatitis  - Chronic, well maintained  - Continue ketoconazole 2% shampoo three times weekly    # Multiple nevi, trunk and extremities  # Solar lentigines  - No concerning features on dermoscopy. We discussed the importance of self exams at home. ABCDE criteria and importance of SPF 30+ sunscreen and photoprotective clothing reviewed.     Follow-up:  Annual for follow up full body skin exam, as needed for new or changing lesions or new concerns    All risks, benefits and alternatives were discussed with patient.  Patient is in agreement and understands the assessment and plan.  All questions were answered.  Ela Clements PA-C  [facility]    ____________________________________________    CC: Skin Check (Mona is here today for a skin check with no concerns )    HPI:  Ms. Mona Wagner is a(n) 34 year old female who presents today as a return  patient for a full body skin cancer screening. No specific cutaneous concerns today. The patient reports trying to be diligent with photoprotection.      Physical Exam:  Vitals: LMP 10/22/2024 (Exact Date)   SKIN: Total skin excluding the genitalia areas was performed. The exam included the scalp, face, neck, bilateral arms, chest, back, abdomen, bilateral legs, digits, mons pubis, buttocks, and nails.   - Dewey II.  - Multiple tan/brown macules and papules scattered throughout exam, consistent with benign nevi. No concerning features on dermoscopy.   - Scattered tan, homogenous macules scattered on sun exposed skin, consistent with solar lentigines.   - Scattered waxy, stuck on appearing papules and patches, consistent with seborrheic keratoses.    - Several 1-2 mm red dome shaped symmetric papules, consistent with cherry angiomas.     Medications:  Current Outpatient Medications   Medication Sig Dispense Refill     acetaminophen (TYLENOL) 325 MG tablet TAKE 2 TABLETS(650 MG) BY MOUTH EVERY 4 HOURS AS NEEDED FOR MILD PAIN 100 tablet 3     cyclobenzaprine (FLEXERIL) 5 MG tablet Take 1 tablet (5 mg) by mouth 3 times daily as needed for muscle spasms. 12 tablet 0     ketoconazole (NIZORAL) 2 % external shampoo Apply topically three times a week Lather in the shower, wait 3-5 minutes before rinsing. 100 mL 11     lactase (LACTAID) 3000 UNIT tablet Take 3,000 Units by mouth 3 times daily (with meals)       MAGNESIUM OXIDE 400 (240 Mg) MG tablet TAKE 1 TABLET(400 MG) BY MOUTH EVERY MORNING 90 tablet 3     mycophenolic acid (GENERIC EQUIVALENT) 180 MG EC tablet Take 3 tablets (540 mg) by mouth 2 times daily. 180 tablet 11     norethindrone (MICRONOR) 0.35 MG tablet TAKE 1 TABLET(0.35 MG) BY MOUTH DAILY 84 tablet 3     patiromer (VELTASSA) 8.4 g packet Take 8.4 g by mouth daily as needed (as directed by your transplant team, for potassium = or > 5.5)       Prenatal Vit-Fe Fumarate-FA (PRENATAL MULTIVITAMIN W/IRON)  27-0.8 MG tablet Take 1 tablet by mouth every other day 90 tablet 1     simethicone (MYLICON) 125 MG chewable tablet Take 125 mg by mouth daily as needed       sodium bicarbonate 650 MG tablet Take 2 tablets (1,300 mg) by mouth 3 times daily. 180 tablet 11     tacrolimus (GENERIC EQUIVALENT) 1 MG capsule Take 3 capsules (3 mg) by mouth 2 times daily. 180 capsule 11     vitamin D3 (CHOLECALCIFEROL) 50 mcg (2000 units) tablet Take 1 tablet (50 mcg) by mouth every morning. 90 tablet 3     Current Facility-Administered Medications   Medication Dose Route Frequency Provider Last Rate Last Admin     sodium chloride (PF) 0.9% PF flush 3 mL  3 mL Intravenous q1 min prn          Facility-Administered Medications Ordered in Other Visits   Medication Dose Route Frequency Provider Last Rate Last Admin     iopamidol (ISOVUE-250) solution 100 mL  100 mL Tube Once Trav Silva PA-C          Past Medical History:   Patient Active Problem List   Diagnosis     Acquired hypothyroidism     Hypomagnesemia     Aftercare following organ transplant     Immunosuppressed status     Kidney replaced by transplant     Vitamin D deficiency     CKD (chronic kidney disease) stage 2, GFR 60-89 ml/min     Bicornate uterus     Hydronephrosis of kidney transplant     Ureteral stenosis     Anemia in stage 2 chronic kidney disease     Calculus of gallbladder and bile duct without cholecystitis or obstruction     Past Medical History:   Diagnosis Date     Anemia in chronic kidney disease      Bacteremia 07/05/2023     Clostridium difficile colitis 8/25/2023     Difficult intubation     see 4/28/23 anesthesia notes     History of bacterial endocarditis      History of blood transfusion      History of DVT (deep vein thrombosis) 2014     History of seizure 2014     History of subarachnoid hemorrhage      Hydronephrosis      Hypothyroidism      Kidney transplanted 08/03/2019    DCD DDKT. Induction with thymo 6mg/kg.     Orthostatic hypotension       FATOUMATA (obstructive sleep apnea)      Pyelonephritis of transplanted kidney 01/23/2020     Secondary renal hyperparathyroidism      Urinary tract infection         Ela Clements PA-C   Dermatology  [address] [place], [place] [phone] on close of this encounter.      Again, thank you for allowing me to participate in the care of your patient.      Sincerely,    Ela Clements PA-C

## 2025-03-11 NOTE — PROGRESS NOTES
[facility] Dermatology Note  Encounter Date: Mar 11, 2025  Office Visit     Reviewed patients past medical history and pertinent chart review prior to patients visit today.     Dermatology Problem List:  1. Keratosis Pilaris  - Past tx: Amlactin  2. Renal transplant for IgA nephropathy in 8/2019 c/b hydronephrosis.   - currently: myfortic and tacrolimus  - annual skin check. Last: 3/4/2024  3. Milial cyst in left labia majora  4. Non-scarring hair loss  - Rogaine deferred  5. Seborrheic dermatitis  - ketoconazole 2% shampoo  6. Right axillary nodule  - Ultrasound 07/06/2022 demonstrated lymph node with a prominent fatty hilum   - Continue to monitor     ____________________________________________    Assessment & Plan:     # Seborrheic dermatitis  - Chronic, well maintained  - Continue ketoconazole 2% shampoo three times weekly    # Multiple nevi, trunk and extremities  # Solar lentigines  - No concerning features on dermoscopy. We discussed the importance of self exams at home. ABCDE criteria and importance of SPF 30+ sunscreen and photoprotective clothing reviewed.     Follow-up:  Annual for follow up full body skin exam, as needed for new or changing lesions or new concerns    All risks, benefits and alternatives were discussed with patient.  Patient is in agreement and understands the assessment and plan.  All questions were answered.  Ela Clements PA-C  [facility] Dermatology    ____________________________________________    CC: Skin Check (Mona is here today for a skin check with no concerns )    HPI:  Ms. Mona Wagner is a(n) 34 year old female who presents today as a return patient for a full body skin cancer screening. No specific cutaneous concerns today. The patient reports trying to be diligent with photoprotection.      Physical Exam:  Vitals: LMP 10/22/2024 (Exact Date)   SKIN: Total skin excluding the genitalia areas was performed. The exam included the scalp, face, neck,  bilateral arms, chest, back, abdomen, bilateral legs, digits, mons pubis, buttocks, and nails.   - Dewey II.  - Multiple tan/brown macules and papules scattered throughout exam, consistent with benign nevi. No concerning features on dermoscopy.   - Scattered tan, homogenous macules scattered on sun exposed skin, consistent with solar lentigines.   - Scattered waxy, stuck on appearing papules and patches, consistent with seborrheic keratoses.    - Several 1-2 mm red dome shaped symmetric papules, consistent with cherry angiomas.     Medications:  Current Outpatient Medications   Medication Sig Dispense Refill    acetaminophen (TYLENOL) 325 MG tablet TAKE 2 TABLETS(650 MG) BY MOUTH EVERY 4 HOURS AS NEEDED FOR MILD PAIN 100 tablet 3    cyclobenzaprine (FLEXERIL) 5 MG tablet Take 1 tablet (5 mg) by mouth 3 times daily as needed for muscle spasms. 12 tablet 0    ketoconazole (NIZORAL) 2 % external shampoo Apply topically three times a week Lather in the shower, wait 3-5 minutes before rinsing. 100 mL 11    lactase (LACTAID) 3000 UNIT tablet Take 3,000 Units by mouth 3 times daily (with meals)      MAGNESIUM OXIDE 400 (240 Mg) MG tablet TAKE 1 TABLET(400 MG) BY MOUTH EVERY MORNING 90 tablet 3    mycophenolic acid (GENERIC EQUIVALENT) 180 MG EC tablet Take 3 tablets (540 mg) by mouth 2 times daily. 180 tablet 11    norethindrone (MICRONOR) 0.35 MG tablet TAKE 1 TABLET(0.35 MG) BY MOUTH DAILY 84 tablet 3    patiromer (VELTASSA) 8.4 g packet Take 8.4 g by mouth daily as needed (as directed by your transplant team, for potassium = or > 5.5)      Prenatal Vit-Fe Fumarate-FA (PRENATAL MULTIVITAMIN W/IRON) 27-0.8 MG tablet Take 1 tablet by mouth every other day 90 tablet 1    simethicone (MYLICON) 125 MG chewable tablet Take 125 mg by mouth daily as needed      sodium bicarbonate 650 MG tablet Take 2 tablets (1,300 mg) by mouth 3 times daily. 180 tablet 11    tacrolimus (GENERIC EQUIVALENT) 1 MG capsule Take 3 capsules (3  mg) by mouth 2 times daily. 180 capsule 11    vitamin D3 (CHOLECALCIFEROL) 50 mcg (2000 units) tablet Take 1 tablet (50 mcg) by mouth every morning. 90 tablet 3     Current Facility-Administered Medications   Medication Dose Route Frequency Provider Last Rate Last Admin    sodium chloride (PF) 0.9% PF flush 3 mL  3 mL Intravenous q1 min prn          Facility-Administered Medications Ordered in Other Visits   Medication Dose Route Frequency Provider Last Rate Last Admin    iopamidol (ISOVUE-250) solution 100 mL  100 mL Tube Once Trav Silva PA-C          Past Medical History:   Patient Active Problem List   Diagnosis    Acquired hypothyroidism    Hypomagnesemia    Aftercare following organ transplant    Immunosuppressed status    Kidney replaced by transplant    Vitamin D deficiency    CKD (chronic kidney disease) stage 2, GFR 60-89 ml/min    Bicornate uterus    Hydronephrosis of kidney transplant    Ureteral stenosis    Anemia in stage 2 chronic kidney disease    Calculus of gallbladder and bile duct without cholecystitis or obstruction     Past Medical History:   Diagnosis Date    Anemia in chronic kidney disease     Bacteremia 07/05/2023    Clostridium difficile colitis 8/25/2023    Difficult intubation     see 4/28/23 anesthesia notes    History of bacterial endocarditis     History of blood transfusion     History of DVT (deep vein thrombosis) 2014    History of seizure 2014    History of subarachnoid hemorrhage     Hydronephrosis     Hypothyroidism     Kidney transplanted 08/03/2019    DCD DDKT. Induction with thymo 6mg/kg.    Orthostatic hypotension     FATOUMATA (obstructive sleep apnea)     Pyelonephritis of transplanted kidney 01/23/2020    Secondary renal hyperparathyroidism     Urinary tract infection        CC Ela Clements PA-C  [facility]  [address] [place] [phone] on close of this encounter.

## 2025-03-11 NOTE — PATIENT INSTRUCTIONS
Patient Education        Proper skin care from [facility]:     -Eliminate harsh soaps as they strip the natural oils from the skin, often resulting in dry itchy skin ( i.e. Dial, Zest, [place])  -Use mild soaps such as Cetaphil or Dove Sensitive Skin in the shower. You do not need to use soap on arms, legs, and trunk every time you shower unless visibly soiled.   -Avoid hot or cold showers.  -After showering, lightly dry off and apply moisturizing within 2-3 minutes. This will help trap moisture in the skin.   -Aggressive use of a moisturizer at least 1-2 times a day to the entire body (including -Vanicream, Cetaphil, Aquaphor or Cerave) and moisturize hands after every washing.  -We recommend using moisturizers that come in a tub that needs to be scooped out, not a pump. This has more of an oil base. It will hold moisture in your skin much better than a water base moisturizer. The above recommended are non-pore clogging.        Wear a sunscreen with at least SPF 30 on your face, ears, neck and V of the chest daily. Wear sunscreen on other areas of the body if those areas are exposed to the sun throughout the day. Sunscreens can contain physical and/or chemical blockers. Physical blockers are less likely to clog pores, these include zinc oxide and titanium dioxide. Reapply every two hour and after swimming.      Sunscreen examples: https://www.ewg.org/sunscreen/     UV radiation  UVA radiation remains constant throughout the day and throughout the year. It is a longer wavelength than UVB and therefore penetrates deeper into the skin leading to immediate and delayed tanning, photoaging, and skin cancer. 70-80% of UVA and UVB radiation occurs between the hours of 10am-2pm.  UVB radiation  UVB radiation causes the most harmful effects and is more significant during the summer months. However, snow and ice can reflect UVB radiation leading to skin damage during the winter months as well. UVB radiation is  responsible for tanning, burning, inflammation, delayed erythema (pinkness), pigmentation (brown spots), and skin cancer.      I recommend self monthly full body exams and yearly full body exams with a dermatology provider. If you develop a new or changing lesion please follow up for examination. Most skin cancers are pink and scaly or pink and pearly. However, we do see blue/brown/black skin cancers.  Consider the ABCDEs of melanoma when giving yourself your monthly full body exam ( don't forget the groin, buttocks, feet, toes, etc). A-asymmetry, B-borders, C-color, D-diameter, E-elevation or evolving. If you see any of these changes please follow up in clinic. If you cannot see your back I recommend purchasing a hand held mirror to use with a larger wall mirror.       Checking for Skin Cancer  You can find cancer early by checking your skin each month. There are 3 kinds of skin cancer. They are melanoma, basal cell carcinoma, and squamous cell carcinoma. Doing monthly skin checks is the best way to find new marks or skin changes. Follow the instructions below for checking your skin.   The ABCDEs of checking moles for melanoma   Check your moles or growths for signs of melanoma using ABCDE:   Asymmetry: the sides of the mole or growth don t match  Border: the edges are ragged, notched, or blurred  Color: the color within the mole or growth varies  Diameter: the mole or growth is larger than 6 mm (size of a pencil eraser)  Evolving: the size, shape, or color of the mole or growth is changing (evolving is not shown in the images below)    Checking for other types of skin cancer  Basal cell carcinoma or squamous cell carcinoma have symptoms such as:      A spot or mole that looks different from all other marks on your skin  Changes in how an area feels, such as itching, tenderness, or pain  Changes in the skin's surface, such as oozing, bleeding, or scaliness  A sore that does not heal  New swelling or redness beyond  the border of a mole     Who s at risk?  Anyone can get skin cancer. But you are at greater risk if you have:   Fair skin, light-colored hair, or light-colored eyes  Many moles or abnormal moles on your skin  A history of sunburns from sunlight or tanning beds  A family history of skin cancer  A history of exposure to radiation or chemicals  A weakened immune system  If you have had skin cancer in the past, you are at risk for recurring skin cancer.   How to check your skin  Do your monthly skin checkups in front of a full-length mirror. Check all parts of your body, including your:   Head (ears, face, neck, and scalp)  Torso (front, back, and sides)  Arms (tops, undersides, upper, and lower armpits)  Hands (palms, backs, and fingers, including under the nails)  Buttocks and genitals  Legs (front, back, and sides)  Feet (tops, soles, toes, including under the nails, and between toes)  If you have a lot of moles, take digital photos of them each month. Make sure to take photos both up close and from a distance. These can help you see if any moles change over time.   Most skin changes are not cancer. But if you see any changes in your skin, call your doctor right away. Only he or she can diagnose a problem. If you have skin cancer, seeing your doctor can be the first step toward getting the treatment that could save your life.   [employer] last reviewed this educational content on 4/1/2019 2000-2020 [employer]. [address] [place] [address], [place] [address]. All rights reserved. This information is not intended as a substitute for professional medical care. Always follow your healthcare professional's instructions.        When should I call my doctor?  If you are worsening or not improving, please, contact us or seek urgent care as noted below.      Who should I call with questions (adults)?  [facility] (adult and pediatric): [phone]  [facility]  [place] (adult): [phone]  [facility] ([place], [place], [place] and [place]) [phone]  For urgent needs outside of business hours call the [facility] at [phone] and ask for the dermatology resident on call to be paged  If this is a medical emergency and you are unable to reach an ER, Call 911        If you need a prescription refill, please contact your pharmacy. Refills are approved or denied by our Physicians during normal business hours, Monday through Fridays  Per office policy, refills will not be granted if you have not been seen within the past year (or sooner depending on your child's condition)

## 2025-03-12 ENCOUNTER — MYC MEDICAL ADVICE (OUTPATIENT)
Dept: SLEEP MEDICINE | Facility: CLINIC | Age: 35
End: 2025-03-12
Payer: MEDICARE

## 2025-03-12 DIAGNOSIS — G47.33 OSA (OBSTRUCTIVE SLEEP APNEA): Primary | ICD-10-CM

## 2025-03-19 ENCOUNTER — MYC REFILL (OUTPATIENT)
Dept: FAMILY MEDICINE | Facility: CLINIC | Age: 35
End: 2025-03-19
Payer: MEDICARE

## 2025-03-19 DIAGNOSIS — Z30.41 ENCOUNTER FOR SURVEILLANCE OF CONTRACEPTIVE PILLS: Primary | ICD-10-CM

## 2025-03-20 RX ORDER — ACETAMINOPHEN AND CODEINE PHOSPHATE 120; 12 MG/5ML; MG/5ML
0.35 SOLUTION ORAL DAILY
Qty: 84 TABLET | Refills: 3 | Status: SHIPPED | OUTPATIENT
Start: 2025-03-20

## 2025-04-07 ENCOUNTER — OFFICE VISIT (OUTPATIENT)
Dept: FAMILY MEDICINE | Facility: CLINIC | Age: 35
End: 2025-04-07
Payer: MEDICARE

## 2025-04-07 VITALS
TEMPERATURE: 98.5 F | DIASTOLIC BLOOD PRESSURE: 73 MMHG | HEART RATE: 76 BPM | BODY MASS INDEX: 29.07 KG/M2 | HEIGHT: 61 IN | SYSTOLIC BLOOD PRESSURE: 111 MMHG | OXYGEN SATURATION: 98 % | RESPIRATION RATE: 18 BRPM | WEIGHT: 154 LBS

## 2025-04-07 DIAGNOSIS — N89.8 VAGINAL DISCHARGE: ICD-10-CM

## 2025-04-07 DIAGNOSIS — Z23 NEED FOR PROPHYLACTIC VACCINATION AGAINST HEPATITIS B VIRUS: ICD-10-CM

## 2025-04-07 DIAGNOSIS — N18.2 ANEMIA IN STAGE 2 CHRONIC KIDNEY DISEASE: ICD-10-CM

## 2025-04-07 DIAGNOSIS — D63.1 ANEMIA IN STAGE 2 CHRONIC KIDNEY DISEASE: ICD-10-CM

## 2025-04-07 DIAGNOSIS — Z11.51 ENCOUNTER FOR SCREENING FOR HUMAN PAPILLOMAVIRUS (HPV): ICD-10-CM

## 2025-04-07 DIAGNOSIS — Z00.00 WELLNESS EXAMINATION: Primary | ICD-10-CM

## 2025-04-07 DIAGNOSIS — Z12.4 CERVICAL CANCER SCREENING: Primary | ICD-10-CM

## 2025-04-07 DIAGNOSIS — C49.A0: ICD-10-CM

## 2025-04-07 DIAGNOSIS — R22.31 MASS OF RIGHT AXILLA: ICD-10-CM

## 2025-04-07 LAB
CLUE CELLS: ABNORMAL
TRICHOMONAS, WET PREP: ABNORMAL
WBC'S/HIGH POWER FIELD, WET PREP: ABNORMAL
YEAST, WET PREP: ABNORMAL

## 2025-04-07 PROCEDURE — 99207 PR NO BILLABLE SERVICE THIS VISIT: CPT

## 2025-04-07 RX ORDER — SIMETHICONE 125 MG
125 TABLET,CHEWABLE ORAL DAILY PRN
Qty: 90 TABLET | Refills: 3 | Status: SHIPPED | OUTPATIENT
Start: 2025-04-07

## 2025-04-07 SDOH — HEALTH STABILITY: PHYSICAL HEALTH: ON AVERAGE, HOW MANY DAYS PER WEEK DO YOU ENGAGE IN MODERATE TO STRENUOUS EXERCISE (LIKE A BRISK WALK)?: 3 DAYS

## 2025-04-07 ASSESSMENT — SOCIAL DETERMINANTS OF HEALTH (SDOH)
HOW OFTEN DO YOU GET TOGETHER WITH FRIENDS OR RELATIVES?: ONCE A WEEK
HOW OFTEN DO YOU GET TOGETHER WITH FRIENDS OR RELATIVES?: ONCE A WEEK

## 2025-04-07 ASSESSMENT — PAIN SCALES - GENERAL: PAINLEVEL_OUTOF10: NO PAIN (0)

## 2025-04-07 NOTE — PROGRESS NOTES
Annual Wellness Visit for 65 years and older    HPI  This 34 year old female presents as a new patient  of myself who presents for an annual wellness visit.    Other issues patient wants to be addressed today:  Chief Complaint   Patient presents with    Wellness Visit       Patient Active Problem List   Diagnosis    Acquired hypothyroidism    Hypomagnesemia    Aftercare following organ transplant    Immunosuppressed status    Kidney replaced by transplant    Vitamin D deficiency    CKD (chronic kidney disease) stage 2, GFR 60-89 ml/min    Bicornate uterus    Hydronephrosis of kidney transplant    Ureteral stenosis    Anemia in stage 2 chronic kidney disease    Calculus of gallbladder and bile duct without cholecystitis or obstruction     Past Medical History:   Diagnosis Date    Anemia in chronic kidney disease     Bacteremia 07/05/2023    Clostridium difficile colitis 8/25/2023    Difficult intubation     see 4/28/23 anesthesia notes    History of bacterial endocarditis     History of blood transfusion     History of DVT (deep vein thrombosis) 2014    History of seizure 2014    History of subarachnoid hemorrhage     Hydronephrosis     Hypothyroidism     Kidney transplanted 08/03/2019    DCD DDKT. Induction with thymo 6mg/kg.    Orthostatic hypotension     FATOUMATA (obstructive sleep apnea)     Pyelonephritis of transplanted kidney 01/23/2020    Secondary renal hyperparathyroidism     Urinary tract infection        Family History   Problem Relation Age of Onset    Diabetes Mother     Kidney Disease Mother     Kidney failure Mother     Coronary Artery Disease Father     Heart Disease Father     Sleep Apnea Father     Neurologic Disorder Father         corticobasal degeneration    Parkinsonism Father     Anesthesia Reaction Sister     Anxiety Disorder Sister     Depression Sister     Kidney failure Sister     Hypertension Sister     Diabetes Sister     Cerebrovascular Disease Sister     Kidney Disease Sister     Kidney  "Disease Brother     Diabetes Maternal Grandmother     Diabetes Maternal Grandfather     Heart Disease Paternal Grandfather     Breast Cancer No family hx of     Cancer - colorectal No family hx of     Ovarian Cancer No family hx of     Prostate Cancer No family hx of     Other Cancer No family hx of     Asthma No family hx of     Deep Vein Thrombosis (DVT) No family hx of     Melanoma No family hx of     Skin Cancer No family hx of     Thrombosis No family hx of      Problem List, Family History and past Medical History reviewed and  unchanged/updated.  {  Nursing Notes:   Tonja Deng RN  4/7/2025  2:24 PM  Signed  Loose stool and frequent stools started yesterdays. No fevers. No cough. No SOB. Has already gone 3 times today since this AM. Sleeping through the night.  Baby at home also with diarrhea and coughing. Would like to monitor over the next few days for improvement, will schedule a follow up if symptoms worsen or don't improve.     Shingrix vaccine, transplant team recommending.     Irregular menses, missed 5 months, last prior to most recent was October 2024. Last LMP 03/13/2025. Skips the \"off week\" and takes bc continuously. No lapses in medication.     Lump right axillary, had review. Has had since 2013. Feels same size. Was to have primary follow up but has not yet. Non-tender upon palpation. No drainage.   Elvis RN    Are you sexually active?  Yes    If yes, with men, women, or both? Male  If yes, do you more than one current partner?No  If yes, are you using condoms?  No  Have you had any sexually transmitted infections? No   Any sexual concerns? No     FOR WOMEN  What year did you stop having periods? Currently menstruating  ON OCPs.  Having withdrawal bleeds when on placebo      Frailty Assessment  1. Weight loss (>5% in year)  No  Wt Readings from Last 5 Encounters:   04/07/25 69.9 kg (154 lb)   02/13/25 68.5 kg (151 lb)   02/12/25 70.3 kg (155 lb)   02/09/25 70.3 kg (155 lb)   09/20/24 68.3 kg " (150 lb 9.6 oz)       2. Exhaustion (perceived effort for a given activity)   How difficult is walking from one room to the other on the same level?not   No     3. Weakness (handgrip strength)   How difficult is lifting or carrying something as heavy as 10 pounds? Not  No    4. Decreased physical activity    Compared with most (men/women) your age, would you say  that you are more active, less active, or about the same? more   No    5. Slow gait speed (timed up and go > 12 sec.)  No      4/7/2025     1:54 PM 2/5/2025    10:19 AM 5/2/2023    11:14 AM 3/13/2020    10:35 AM   FALL RISK ASSESSMENT   Fallen 2 or more times in the past year? No No No No   Any fall with injury in the past year?   No No       Frailty screen score: 0    Frail Assessment:0 Robust         EVALUATION OF COGNITIVE FUNCTION  Mood/affect:Normal  Appearance:Normal  Family member/caregiver input: Normal    PHQ-2 Score:       4/7/2025     1:54 PM 2/12/2025     9:10 AM   PHQ-2 ( 1999 Pfizer)   Q1: Little interest or pleasure in doing things 0 2   Q2: Feeling down, depressed or hopeless 0 0   PHQ-2 Score 0  2    Q1: Little interest or pleasure in doing things Not at all More than half the days   Q2: Feeling down, depressed or hopeless Not at all Not at all   PHQ-2 Score 0 2       Patient-reported       PHQ-9 Score:       3/15/2023     9:30 AM   [facility] Follow-up to PHQ   PHQ-9 9. Suicide Ideation past 2 weeks Not at all       Mini Cog Test:  Recall result:  3 points   Clock Draw Test result: Normal    Cognitive screen is:Negative      Other Assessments:  CV Risk based on Pooled Cohort Risk    Strong family hx.  Previously on atorvastatin but unclear why.  Prescribed after transplant.  Now not on statin.  Patient does have hx hld.  Father with CAD and cabg at age 50.  Other family members with CAD at young age.       Corrected Visual acuity: wears glasses.    Hearing evaluation if done: No      Advance Directives: Discussed with patient and family as  "appropriate. Has patient  completed advance directives and/or a living will? No-provided healthcare directive    Immunization History   Administered Date(s) Administered    COVID-19 12+ (MODERNA) 10/29/2023    COVID-19 Monovalent 18+ (Moderna) 03/20/2021, 04/17/2021, 09/07/2021    COVID-19 Monovalent Booster 18+ (Moderna) 04/22/2022    DTAP (<7y) 09/22/1997    Flu, Unspecified 11/01/2018, 09/03/2020    HEPA 12/31/2008, 09/04/2009    HPV 12/31/2008, 09/04/2009, 06/10/2010    HPV Quadrivalent 12/31/2008, 09/04/2009, 06/10/2010    HepA-Peds, Unspecified 09/04/2009    HepB 09/02/1997, 04/22/2005, 08/03/2005    Hepatitis A (Vaqta/Havrix)(Peds 12m-18y) 12/31/2008    Hepatitis B, Peds (Engerix-B/Recombivax HB) 09/25/1997, 04/22/2005, 08/03/2005    Influenza (IIV3) PF 12/31/2008, 12/23/2013, 10/15/2015, 09/22/2016, 09/25/2017, 09/28/2018    Influenza Vaccine (Flucelvax Quadrivalent) 09/03/2020    Influenza Vaccine >6 months,quad, PF 09/19/2014, 10/07/2019, 09/03/2020, 10/13/2021, 09/15/2022, 09/21/2023    Influenza Vaccine IM Ages 6-35 Months 4 Valent (PF) 09/22/2022    Influenza, Split Virus, Trivalent, Pf (Fluzone\Fluarix) 10/17/2024    MMR (MMRII) 04/22/2005    Mantoux Tuberculin Skin Test 12/12/2013    Meningococcal (Menomune ) 09/04/2009    Meningococcal ACWY (Menactra ) 09/04/2009    OPV, trivalent, live 09/22/1997    Pneumo Conj 13-V (2010&after) 08/20/2021    Pneumococcal 23 valent 12/30/2013, 12/28/2018    Poliovirus, inactivated (IPV) 09/22/1997    TD,PF 7+ (Tenivac) 04/22/2005    TDAP (Adacel,Boostrix) 09/04/2009, 07/19/2023    TDAP Vaccine (Adacel) 09/04/2009    TDAP Vaccine (Boostrix) 03/17/2022    Varicella (Varivax) 12/31/2008, 09/04/2009     Reviewed Immunization Record Today  Physical Exam  Vitals: /73 (BP Location: Right arm, Patient Position: Sitting, Cuff Size: Adult Regular)   Pulse 76   Temp 98.5  F (36.9  C) (Oral)   Resp 18   Ht 1.537 m (5' 0.5\")   Wt 69.9 kg (154 lb)   LMP 03/13/2025 " (Exact Date)   SpO2 98%   Breastfeeding No   BMI 29.58 kg/m    BMI= Body mass index is 29.58 kg/m .  EXAM:  GEN: NAD  HEENT: head atraumatic, normocephalic, moist mucous membranes, eyes anicteric  CV: RRR w/o M/R/G  PULM: CTAB  ABD: soft, non-tender, no appreciable masses, no guarding, BS present  Neuro: alert and oriented x 3, CN II-XII intact, normal gross motor movements  Psych: appropriate  Ext: no peripheral edema  GYN: normal.  Swabs collected    Assessment and Plan:    Medically complex patient with hx renal transplant here for annual wellness.  We discussed a few things including the following    Right axillary lymph node  Appears to be stable from 2022.  Was supposed to have repeat ultrasound 3 to 6 months after initial ultrasound but this was not done.  On my examination mass feels approximately the same size as reported from 2022 ultrasound.  Will repeat ultrasound.  If this is unchanged it is most likely benign and does not need to be followed into the future.      Pap smear  Patient with a history of kidney transplant was supposed to have annual Pap smears for years starting in 2023.  She missed last year's Pap smear.  In 2023 Pap smear was normal.  Will collect today.  Small amount of thick discharge which was collected for wet prep.      Family history of cardiovascular disease patient with strong family history of cardiovascular disease in father with coronary artery disease and CABG when he was 50 years old.  She has hyperlipidemia.  Started on atorvastatin after surgery but now is not on it and she is not clear why.  I will need to look back at these records.  I think it would be very reasonable for the patient to see a provider cardiologist to see if earlier intervention would be appropriate in her case.  She currently is unsure if she would like to be pregnant soon encouraged her to think about this prior to preventive cardiology visit.    Due for covid and shingles vaccines due to  immunecompromised status    Reviewed Preventive Services and Plan form with patient as specified in  Patient Instructions.  Positive findings on assessment: small right axillary mass, consistent with likely lymph node{  Mona was seen today for wellness visit.    Diagnoses and all orders for this visit:    Cervical cancer screening    Need for prophylactic vaccination against hepatitis B virus    Anemia in stage 2 chronic kidney disease        Options for treatment and follow-up care were reviewed with the Mona Kristy  and/or guardian engaged in the decision making process and verbalized  understanding of the options discussed and agreed with the final plan.  Macarena Bowen MD

## 2025-04-07 NOTE — NURSING NOTE
"Loose stool and frequent stools started yesterdays. No fevers. No cough. No SOB. Has already gone 3 times today since this AM. Sleeping through the night.  Baby at home also with diarrhea and coughing. Would like to monitor over the next few days for improvement, will schedule a follow up if symptoms worsen or don't improve.     Shingrix vaccine, transplant team recommending.     Irregular menses, missed 5 months, last prior to most recent was October 2024. Last LMP 03/13/2025. Skips the \"off week\" and takes bc continuously. No lapses in medication.     Lump right axillary, had review. Has had since 2013. Feels same size. Was to have primary follow up but has not yet. Non-tender upon palpation. No drainage.   Elvis RN  "

## 2025-04-07 NOTE — PROGRESS NOTES
Preventive Care Visit  [facility]  Macarena Bowen MD, Family Medicine  Apr 7, 2025  {Provider  Link to [facility] :216309}    {PROVIDER CHARTING PREFERENCE:688166}    Elsa Dunn is a 34 year old, presenting for the following:  Wellness Visit        4/7/2025     2:01 PM   Additional Questions   Roomed by Tonja Deng RN         4/7/2025     2:01 PM   Patient Reported Additional Medications   Patient reports taking the following new medications Denies         4/7/2025    Information    services provided? No     {ROOMER if patient is in their first year of Medicare a vision screen is required click here to document the Vison screen and then refresh the note to pull in results  :544041}      HPI  ***   {MA/LPN/RN Pre-Provider Visit Orders- hCG/UA/Strep (Optional):001976}  {SUPERLIST (Optional):625334}  {additonal problems for provider to add (Optional):703908}  Advance Care Planning  Patient does not have a Health Care Directive: Discussed advance care planning with patient; information given to patient to review.      4/7/2025   General Health   How would you rate your overall physical health? (!) FAIR   Feel stress (tense, anxious, or unable to sleep) Only a little   (!) STRESS CONCERN      4/7/2025   Nutrition   Diet: Regular (no restrictions)         4/7/2025   Exercise   Days per week of moderate/strenous exercise 3 days         4/7/2025   Social Factors   Frequency of gathering with friends or relatives Once a week   Worry food won't last until get money to buy more No   Food not last or not have enough money for food? No   Do you have housing? (Housing is defined as stable permanent housing and does not include staying ouside in a car, in a tent, in an abandoned building, in an overnight shelter, or couch-surfing.) Yes   Are you worried about losing your housing? No   Lack of transportation? No   Unable to get utilities (heat,electricity)? No          4/7/2025   Fall Risk   Fallen 2 or more times in the past year? No   Trouble with walking or balance? No          4/7/2025   Activities of Daily Living- Home Safety   Needs help with the following daily activites None of the above   Safety concerns in the home None of the above         4/7/2025   Dental   Dentist two times every year? (!) NO         4/7/2025   Hearing Screening   Hearing concerns? None of the above         4/7/2025   Driving Risk Screening   Patient/family members have concerns about driving No         4/7/2025   General Alertness/Fatigue Screening   Have you been more tired than usual lately? No         4/7/2025   Urinary Incontinence Screening   Bothered by leaking urine in past 6 months No           Today's PHQ-2 Score:       4/7/2025     1:54 PM   PHQ-2 ( 1999 Pfizer)   Q1: Little interest or pleasure in doing things 0   Q2: Feeling down, depressed or hopeless 0   PHQ-2 Score 0    Q1: Little interest or pleasure in doing things Not at all   Q2: Feeling down, depressed or hopeless Not at all   PHQ-2 Score 0       Patient-reported           4/7/2025   Substance Use   Alcohol more than 3/day or more than 7/wk Not Applicable   Do you have a current opioid prescription? No   How severe/bad is pain from 1 to 10? 3/10   Do you use any other substances recreationally? No     Social History     Tobacco Use    Smoking status: Never     Passive exposure: Never    Smokeless tobacco: Never   Vaping Use    Vaping status: Never Used   Substance Use Topics    Alcohol use: Never    Drug use: Never     {Provider  If there are gaps in the social history shown above, please follow the link to update and then refresh the note Link to Social and Substance History :933816}     {Mammogram Decision Support (Optional):102654}      History of abnormal Pap smear: { :858093}        Latest Ref Rng & Units 3/15/2023     9:02 AM 3/13/2020    11:34 AM   PAP / HPV   PAP  Negative for Intraepithelial Lesion or Malignancy  (NILM)     HPV 16 DNA Negative Negative  Negative    HPV 18 DNA Negative Negative  Negative    Other HR HPV Negative Negative  Negative            4/7/2025   Contraception/Family Planning   Questions about contraception or family planning No     {Provider  REQUIRED FOR AWV Use the storyboard to review patient history, after sections have been marked as reviewed, refresh note to capture documentation:019850}  {Provider   REQUIRED AWV use this link to review and update sexual activity history  after section has been marked as reviewed, refresh note to capture documentation:914344}  Reviewed and updated as needed this visit by Provider                    {HISTORY OPTIONS (Optional):028935}  Current providers sharing in care for this patient include:  Patient Care Team:  Macarena Bowen MD as PCP - General (Family Practice)  Melissa Coles MD as MD (INTERNAL MEDICINE - ENDOCRINOLOGY, DIABETES & METABOLISM)  Theresa Sapp MD as MD (Neurology)  Delicia Parra MD as MD (Nephrology)  Dorian Johnson MD as MD (Transplant)  Wally Britt MD as MD (Urology)  Macarena Bowen MD as Referring Physician (Family Practice)  Anthony Hendricks [facility] as Pharmacist (Pharmacist)  Frantz Stahl MD as MD (Nephrology)  Giulia Krishna GC as Genetic Counselor (Genetic Counselor, MS)  Dean Wilson MD as Hospitalist (Internal Medicine)  Macarena Bowen MD as Assigned PCP  Baudilio Arellano MD as MD (Dermatology)  Seb Nicole MD as MD (Critical Care)  Melissa Jones MD as MD (Surgery)  Katrin Lopez MD as Assigned Endocrinology Provider  Shelley Stout MD as MD (Allergy & Immunology)  Gelacio Mcintyre MD as MD (Nephrology)  Jas Lyon DO as Assigned Sleep Provider  Nakul Hill MD as Assigned Nephrology Provider  Shelley Stout MD as Assigned Allergy Provider  Dorian Johnson MD as Assigned Surgical  "Provider  Ela Clements PA-C as Assigned Dermatology Provider    The following health maintenance items are reviewed in Epic and correct as of today:  Health Maintenance   Topic Date Due    ADVANCE CARE PLANNING  Never done    HEPATITIS B IMMUNIZATION (1 of 1 - Risk Dialysis 4-dose series) 08/03/2006    ZOSTER IMMUNIZATION (1 of 2) Never done    MEDICARE ANNUAL WELLNESS VISIT  03/13/2021    HPV TEST  03/15/2024    PAP  03/15/2024    COVID-19 Vaccine (6 - 2024-25 season) 09/01/2024    MICROALBUMIN  11/29/2024    LIPID  02/12/2026    BMP  02/13/2026    TSH W/FREE T4 REFLEX  02/13/2026    DTAP/TDAP/TD IMMUNIZATION (7 - Td or Tdap) 07/19/2033    Pneumococcal Vaccine: Pediatrics (0 to 5 Years) and At-Risk Patients (6 to 49 Years) (4 of 4 - PCV20 or PCV21) 11/23/2042    HEPATITIS C SCREENING  Completed    HIV SCREENING  Completed    PHQ-2 (once per calendar year)  Completed    INFLUENZA VACCINE  Completed    URINALYSIS  Completed    HPV IMMUNIZATION  Completed    MENINGITIS IMMUNIZATION  Aged Out       {ROS Picklists (Optional):692692}     Objective    Exam  /73 (BP Location: Right arm, Patient Position: Sitting, Cuff Size: Adult Regular)   Pulse 76   Temp 98.5  F (36.9  C) (Oral)   Resp 18   Ht 1.537 m (5' 0.5\")   Wt 69.9 kg (154 lb)   LMP 03/13/2025 (Exact Date)   SpO2 98%   Breastfeeding No   BMI 29.58 kg/m     Estimated body mass index is 29.58 kg/m  as calculated from the following:    Height as of this encounter: 1.537 m (5' 0.5\").    Weight as of this encounter: 69.9 kg (154 lb).    Physical Exam  {Exam Choices (Optional):305573}        4/7/2025   Mini Cog   Clock Draw Score 2 Normal   3 Item Recall 3 objects recalled     {A Mini-Cog total score of 0-2 suggests the possibility of dementia, score of 3-5 suggests no dementia:734497}          Signed Electronically by: Macarena Bowen MD  {Email feedback regarding this note to [email or web address]   :079399}  "

## 2025-04-08 LAB
HPV HR 12 DNA CVX QL NAA+PROBE: NEGATIVE
HPV16 DNA CVX QL NAA+PROBE: NEGATIVE
HPV18 DNA CVX QL NAA+PROBE: NEGATIVE
HUMAN PAPILLOMA VIRUS FINAL DIAGNOSIS: NORMAL

## 2025-04-10 LAB
BKR AP ASSOCIATED HPV REPORT: NORMAL
BKR LAB AP GYN ADEQUACY: NORMAL
BKR LAB AP GYN INTERPRETATION: NORMAL
BKR LAB AP PREVIOUS ABNORMAL: NORMAL
PATH REPORT.COMMENTS IMP SPEC: NORMAL
PATH REPORT.COMMENTS IMP SPEC: NORMAL
PATH REPORT.RELEVANT HX SPEC: NORMAL

## 2025-04-14 ENCOUNTER — LAB (OUTPATIENT)
Dept: LAB | Facility: HOSPITAL | Age: 35
End: 2025-04-14
Payer: MEDICARE

## 2025-04-14 DIAGNOSIS — Z94.0 KIDNEY TRANSPLANTED: ICD-10-CM

## 2025-04-14 LAB
ANION GAP SERPL CALCULATED.3IONS-SCNC: 9 MMOL/L (ref 7–15)
BUN SERPL-MCNC: 30.7 MG/DL (ref 6–20)
CALCIUM SERPL-MCNC: 9.3 MG/DL (ref 8.8–10.4)
CHLORIDE SERPL-SCNC: 106 MMOL/L (ref 98–107)
CREAT SERPL-MCNC: 1.19 MG/DL (ref 0.51–0.95)
EGFRCR SERPLBLD CKD-EPI 2021: 62 ML/MIN/1.73M2
ERYTHROCYTE [DISTWIDTH] IN BLOOD BY AUTOMATED COUNT: 12.6 % (ref 10–15)
GLUCOSE SERPL-MCNC: 99 MG/DL (ref 70–99)
HCO3 SERPL-SCNC: 25 MMOL/L (ref 22–29)
HCT VFR BLD AUTO: 36.9 % (ref 35–47)
HGB BLD-MCNC: 11.8 G/DL (ref 11.7–15.7)
MCH RBC QN AUTO: 27.8 PG (ref 26.5–33)
MCHC RBC AUTO-ENTMCNC: 32 G/DL (ref 31.5–36.5)
MCV RBC AUTO: 87 FL (ref 78–100)
PLATELET # BLD AUTO: 206 10E3/UL (ref 150–450)
POTASSIUM SERPL-SCNC: 4.5 MMOL/L (ref 3.4–5.3)
RBC # BLD AUTO: 4.24 10E6/UL (ref 3.8–5.2)
SODIUM SERPL-SCNC: 140 MMOL/L (ref 135–145)
TACROLIMUS BLD-MCNC: 6.6 UG/L (ref 5–15)
TME LAST DOSE: NORMAL H
TME LAST DOSE: NORMAL H
WBC # BLD AUTO: 6.8 10E3/UL (ref 4–11)

## 2025-04-14 PROCEDURE — 80197 ASSAY OF TACROLIMUS: CPT

## 2025-04-14 PROCEDURE — 36415 COLL VENOUS BLD VENIPUNCTURE: CPT

## 2025-04-14 PROCEDURE — 80048 BASIC METABOLIC PNL TOTAL CA: CPT

## 2025-04-14 PROCEDURE — 85018 HEMOGLOBIN: CPT

## 2025-04-16 DIAGNOSIS — Z94.0 KIDNEY TRANSPLANT RECIPIENT: Primary | ICD-10-CM

## 2025-04-16 DIAGNOSIS — D84.9 IMMUNOSUPPRESSED STATUS: ICD-10-CM

## 2025-04-23 ENCOUNTER — TELEPHONE (OUTPATIENT)
Dept: ENDOCRINOLOGY | Facility: CLINIC | Age: 35
End: 2025-04-23

## 2025-04-23 ENCOUNTER — LAB (OUTPATIENT)
Dept: LAB | Facility: HOSPITAL | Age: 35
End: 2025-04-23
Payer: MEDICARE

## 2025-04-23 DIAGNOSIS — Z94.0 KIDNEY TRANSPLANT RECIPIENT: ICD-10-CM

## 2025-04-23 DIAGNOSIS — N18.2 ANEMIA IN STAGE 2 CHRONIC KIDNEY DISEASE: ICD-10-CM

## 2025-04-23 DIAGNOSIS — D63.1 ANEMIA IN STAGE 2 CHRONIC KIDNEY DISEASE: ICD-10-CM

## 2025-04-23 DIAGNOSIS — E03.9 ACQUIRED HYPOTHYROIDISM: ICD-10-CM

## 2025-04-23 LAB
ANION GAP SERPL CALCULATED.3IONS-SCNC: 6 MMOL/L (ref 7–15)
BUN SERPL-MCNC: 20.5 MG/DL (ref 6–20)
CALCIUM SERPL-MCNC: 9.2 MG/DL (ref 8.8–10.4)
CHLORIDE SERPL-SCNC: 108 MMOL/L (ref 98–107)
CREAT SERPL-MCNC: 1.07 MG/DL (ref 0.51–0.95)
CREAT UR-MCNC: 39.6 MG/DL
EGFRCR SERPLBLD CKD-EPI 2021: 70 ML/MIN/1.73M2
ERYTHROCYTE [DISTWIDTH] IN BLOOD BY AUTOMATED COUNT: 12.7 % (ref 10–15)
GLUCOSE SERPL-MCNC: 104 MG/DL (ref 70–99)
HCO3 SERPL-SCNC: 24 MMOL/L (ref 22–29)
HCT VFR BLD AUTO: 35.3 % (ref 35–47)
HGB BLD-MCNC: 11.4 G/DL (ref 11.7–15.7)
MCH RBC QN AUTO: 28.1 PG (ref 26.5–33)
MCHC RBC AUTO-ENTMCNC: 32.3 G/DL (ref 31.5–36.5)
MCV RBC AUTO: 87 FL (ref 78–100)
MICROALBUMIN UR-MCNC: 21.1 MG/L
MICROALBUMIN/CREAT UR: 53.28 MG/G CR (ref 0–25)
PLATELET # BLD AUTO: 211 10E3/UL (ref 150–450)
POTASSIUM SERPL-SCNC: 4.5 MMOL/L (ref 3.4–5.3)
RBC # BLD AUTO: 4.06 10E6/UL (ref 3.8–5.2)
SODIUM SERPL-SCNC: 138 MMOL/L (ref 135–145)
TACROLIMUS BLD-MCNC: 5.9 UG/L (ref 5–15)
TME LAST DOSE: NORMAL H
TME LAST DOSE: NORMAL H
TSH SERPL DL<=0.005 MIU/L-ACNC: 1.87 UIU/ML (ref 0.3–4.2)
WBC # BLD AUTO: 6.4 10E3/UL (ref 4–11)

## 2025-04-23 PROCEDURE — 82570 ASSAY OF URINE CREATININE: CPT

## 2025-04-23 PROCEDURE — 84443 ASSAY THYROID STIM HORMONE: CPT

## 2025-04-23 PROCEDURE — 84295 ASSAY OF SERUM SODIUM: CPT

## 2025-04-23 PROCEDURE — 85027 COMPLETE CBC AUTOMATED: CPT

## 2025-04-23 PROCEDURE — 80048 BASIC METABOLIC PNL TOTAL CA: CPT

## 2025-04-23 PROCEDURE — 80197 ASSAY OF TACROLIMUS: CPT

## 2025-04-23 PROCEDURE — 36415 COLL VENOUS BLD VENIPUNCTURE: CPT

## 2025-04-23 PROCEDURE — 82043 UR ALBUMIN QUANTITATIVE: CPT

## 2025-04-23 NOTE — TELEPHONE ENCOUNTER
Labs noted below    -  Dear Mona    Recent TSH was NORMAL- this is great news.     If you have any questions, please feel free to contact my nurse at [phone] select option #3 for triage nurse  or  option #1 for scheduling related questions.    Regards    Katrin Lopez MD     Lab on 04/23/2025   Component Date Value Ref Range Status    TSH 04/23/2025 1.87  0.30 - 4.20 uIU/mL Final

## 2025-05-21 ENCOUNTER — HOSPITAL ENCOUNTER (OUTPATIENT)
Dept: ULTRASOUND IMAGING | Facility: HOSPITAL | Age: 35
Discharge: HOME | End: 2025-05-21
Attending: STUDENT IN AN ORGANIZED HEALTH CARE EDUCATION/TRAINING PROGRAM
Payer: MEDICARE

## 2025-05-21 DIAGNOSIS — R22.31 MASS OF RIGHT AXILLA: ICD-10-CM

## 2025-05-21 PROCEDURE — 76882 US LMTD JT/FCL EVL NVASC XTR: CPT | Mod: RT

## 2025-06-06 ENCOUNTER — RESULTS FOLLOW-UP (OUTPATIENT)
Dept: FAMILY MEDICINE | Facility: CLINIC | Age: 35
End: 2025-06-06

## 2025-06-06 NOTE — NURSING NOTE
Dermatology Rooming Note    Mona Wagner's goals for this visit include:   Chief Complaint   Patient presents with     Skin Check     Kidney transplant. No spots of concern today.     Lola Cobb, CMA   No

## 2025-06-18 ENCOUNTER — RESULTS FOLLOW-UP (OUTPATIENT)
Dept: TRANSPLANT | Facility: CLINIC | Age: 35
End: 2025-06-18

## 2025-06-18 ENCOUNTER — LAB (OUTPATIENT)
Dept: LAB | Facility: HOSPITAL | Age: 35
End: 2025-06-18
Payer: MEDICARE

## 2025-06-18 DIAGNOSIS — D84.9 IMMUNOSUPPRESSED STATUS: Primary | ICD-10-CM

## 2025-06-18 DIAGNOSIS — Z48.298 AFTERCARE FOLLOWING ORGAN TRANSPLANT: ICD-10-CM

## 2025-06-18 DIAGNOSIS — Z79.899 ENCOUNTER FOR LONG-TERM CURRENT USE OF MEDICATION: ICD-10-CM

## 2025-06-18 DIAGNOSIS — Z98.890 OTHER SPECIFIED POSTPROCEDURAL STATES: ICD-10-CM

## 2025-06-18 DIAGNOSIS — Z20.828 CONTACT WITH AND (SUSPECTED) EXPOSURE TO OTHER VIRAL COMMUNICABLE DISEASES: ICD-10-CM

## 2025-06-18 DIAGNOSIS — Z94.0 KIDNEY REPLACED BY TRANSPLANT: ICD-10-CM

## 2025-06-18 LAB
ALBUMIN MFR UR ELPH: <6 MG/DL
ANION GAP SERPL CALCULATED.3IONS-SCNC: 8 MMOL/L (ref 7–15)
BUN SERPL-MCNC: 23.7 MG/DL (ref 6–20)
CALCIUM SERPL-MCNC: 9.5 MG/DL (ref 8.8–10.4)
CHLORIDE SERPL-SCNC: 102 MMOL/L (ref 98–107)
CREAT SERPL-MCNC: 1.01 MG/DL (ref 0.51–0.95)
CREAT UR-MCNC: 56.8 MG/DL
EGFRCR SERPLBLD CKD-EPI 2021: 75 ML/MIN/1.73M2
ERYTHROCYTE [DISTWIDTH] IN BLOOD BY AUTOMATED COUNT: 12.8 % (ref 10–15)
GLUCOSE SERPL-MCNC: 100 MG/DL (ref 70–99)
HCO3 SERPL-SCNC: 26 MMOL/L (ref 22–29)
HCT VFR BLD AUTO: 35.3 % (ref 35–47)
HGB BLD-MCNC: 11.2 G/DL (ref 11.7–15.7)
MCH RBC QN AUTO: 27.9 PG (ref 26.5–33)
MCHC RBC AUTO-ENTMCNC: 31.7 G/DL (ref 31.5–36.5)
MCV RBC AUTO: 88 FL (ref 78–100)
PLATELET # BLD AUTO: 184 10E3/UL (ref 150–450)
POTASSIUM SERPL-SCNC: 4.3 MMOL/L (ref 3.4–5.3)
PROT/CREAT 24H UR: NORMAL MG/G{CREAT}
RBC # BLD AUTO: 4.01 10E6/UL (ref 3.8–5.2)
SODIUM SERPL-SCNC: 136 MMOL/L (ref 135–145)
TACROLIMUS BLD-MCNC: 6.5 UG/L (ref 5–15)
TME LAST DOSE: NORMAL H
TME LAST DOSE: NORMAL H
WBC # BLD AUTO: 7 10E3/UL (ref 4–11)

## 2025-06-18 PROCEDURE — 85027 COMPLETE CBC AUTOMATED: CPT

## 2025-06-18 PROCEDURE — 80197 ASSAY OF TACROLIMUS: CPT

## 2025-06-18 PROCEDURE — 84156 ASSAY OF PROTEIN URINE: CPT

## 2025-06-18 PROCEDURE — 80048 BASIC METABOLIC PNL TOTAL CA: CPT

## 2025-06-18 PROCEDURE — 36415 COLL VENOUS BLD VENIPUNCTURE: CPT

## 2025-06-19 RX ORDER — TACROLIMUS 0.5 MG/1
0.5 CAPSULE ORAL 2 TIMES DAILY
Qty: 60 CAPSULE | Refills: 11 | Status: SHIPPED | OUTPATIENT
Start: 2025-06-19

## 2025-06-19 RX ORDER — TACROLIMUS 1 MG/1
2 CAPSULE ORAL 2 TIMES DAILY
Qty: 120 CAPSULE | Refills: 11 | Status: SHIPPED | OUTPATIENT
Start: 2025-06-19

## 2025-06-19 NOTE — PROGRESS NOTES
ISSUE:   Tacrolimus IR level 6.6 on 06/18/25, goal 5, dose 3 mg BID.    PLAN:   Call Patient and confirm this was an accurate 12-hour trough.   Verify Tacrolimus IR dose 3 mg BID.   Confirm no new medications or illness.   Confirm no missed doses.     If accurate trough and accurate dose, decrease Tacrolimus IR dose to 2.5 mg BID  *Recommended dose rounded from calculated dose 2.27 mg  BID.      Repeat labs in 2 weeks.   For any dose change <50%, recheck labs per guideline or within 1 month.  For any dose change of more than 50%, recheck drug level based on timing to reach steady state:   Immediate release tacrolimus: 2-3 days  Extended-release tacrolimus: 7 days  Cyclosporine: 2 days  Sirolimus: 12 days  Everolimus: 8 days      OUTCOME:   See MyChart

## 2025-06-26 ENCOUNTER — OFFICE VISIT (OUTPATIENT)
Dept: TRANSPLANT | Facility: CLINIC | Age: 35
End: 2025-06-26
Attending: INTERNAL MEDICINE
Payer: MEDICARE

## 2025-06-26 VITALS
DIASTOLIC BLOOD PRESSURE: 76 MMHG | BODY MASS INDEX: 29.77 KG/M2 | HEART RATE: 79 BPM | TEMPERATURE: 97.9 F | OXYGEN SATURATION: 98 % | WEIGHT: 155 LBS | SYSTOLIC BLOOD PRESSURE: 118 MMHG

## 2025-06-26 DIAGNOSIS — Z48.298 AFTERCARE FOLLOWING ORGAN TRANSPLANT: ICD-10-CM

## 2025-06-26 DIAGNOSIS — K80.70 CALCULUS OF GALLBLADDER AND BILE DUCT WITHOUT CHOLECYSTITIS OR OBSTRUCTION: ICD-10-CM

## 2025-06-26 DIAGNOSIS — T86.19 HYDRONEPHROSIS OF KIDNEY TRANSPLANT: ICD-10-CM

## 2025-06-26 DIAGNOSIS — Q62.10 URETERAL STENOSIS: ICD-10-CM

## 2025-06-26 DIAGNOSIS — D84.9 IMMUNOSUPPRESSED STATUS: ICD-10-CM

## 2025-06-26 DIAGNOSIS — E83.42 HYPOMAGNESEMIA: ICD-10-CM

## 2025-06-26 DIAGNOSIS — N13.30 HYDRONEPHROSIS OF KIDNEY TRANSPLANT: ICD-10-CM

## 2025-06-26 DIAGNOSIS — Z94.0 KIDNEY TRANSPLANT RECIPIENT: ICD-10-CM

## 2025-06-26 DIAGNOSIS — D63.1 ANEMIA IN STAGE 2 CHRONIC KIDNEY DISEASE: ICD-10-CM

## 2025-06-26 DIAGNOSIS — N18.2 ANEMIA IN STAGE 2 CHRONIC KIDNEY DISEASE: ICD-10-CM

## 2025-06-26 DIAGNOSIS — N18.2 CKD (CHRONIC KIDNEY DISEASE) STAGE 2, GFR 60-89 ML/MIN: ICD-10-CM

## 2025-06-26 DIAGNOSIS — E03.9 ACQUIRED HYPOTHYROIDISM: Primary | ICD-10-CM

## 2025-06-26 DIAGNOSIS — Z94.0 KIDNEY REPLACED BY TRANSPLANT: ICD-10-CM

## 2025-06-26 PROCEDURE — G0463 HOSPITAL OUTPT CLINIC VISIT: HCPCS | Performed by: INTERNAL MEDICINE

## 2025-06-26 RX ORDER — FAMOTIDINE 20 MG/1
20 TABLET, FILM COATED ORAL 2 TIMES DAILY
Qty: 180 TABLET | Refills: 1 | Status: SHIPPED | OUTPATIENT
Start: 2025-06-26 | End: 2025-12-23

## 2025-06-26 ASSESSMENT — PAIN SCALES - GENERAL: PAINLEVEL_OUTOF10: NO PAIN (0)

## 2025-06-26 NOTE — LETTER
6/26/2025      Mona Wagner  [address] [place] [address]      Dear Colleague,    Thank you for referring your patient, Mona Wagner, to the [facility]. Please see a copy of my visit note below.    TRANSPLANT NEPHROLOGY CLINIC VISIT     Assessment & Plan  # DDKT: CKD Stage 2 - Stable   - Baseline Creatinine: ~ 1-1.3   - Proteinuria: Normal (<0.2 grams)   - DSA Hx: No DSA   - Last cPRA: 26%   - BK Viremia: No   - Kidney Tx Biopsy Hx: No history of acute rejection.   - Primary Nephrologist: None.    # Immunosuppression: Tacrolimus immediate release (goal 4-6) and Mycophenolic acid (dose 540 mg every 12 hours)   - Induction with Recent Transplant:  High Intensity Protocol   - Continue with intensive monitoring of immunosuppression for efficacy and toxicity.   - Historical Changes in Immunosuppression: Changed off azathioprine back to mycophenolic acid following pregnancy.   - Changes: Not at this time    # Infection Prevention:   Last CD4 Level: 201 (7/2020)  - PJP: None      - CMV IgG Ab High Risk Discordance (D+/R-) at time of transplant: No  Present CMV Serostatus: Positive  - EBV IgG Ab High Risk Discordance (D+/R-) at time of transplant: No  Present EBV Serostatus: Positive    # Hypertension: Controlled;  Goal BP: < 130/80   - Changes: Not at this time    # Anemia in Chronic Renal Disease: Hgb: Stable, near normal      CHARLINE: No   - Iron studies: Replete    # Mineral Bone Disorder:    - Secondary renal hyperparathyroidism; PTH level: low        On treatment: None  - Vitamin D; level: Not checked recently, but was normal last check        On supplement: Yes  - Calcium; level: Normal        On supplement: No    # Electrolytes:  - Potassium; level: Normal        On supplement: No  - Magnesium; level: Low        On supplement: Yes  - Bicarbonate; level: Normal        On supplement: No    # Other Significant PMH:   - Kidney Transplant Ureteral Stenosis/Hydronephrosis:   -Patient with  moderate hydronephrosis of kidney transplant during pregnancyand developed CAMERON.  She underwent PNT 6/9/23 with repeat kidney transplant ultrasound 6/11/23 still showing moderate hydronephrosis suggesting this may be a functional hydronephrosis from pregnancy  - She has a previous h/o ureteral stenosis with ureteral stent removed 2/2021.  Previous kidney transplant ultrasound 7/2021 also showed moderate hydronephrosis unchanged with voiding. Hydronephrosis was unchanged on 7/6/22 abdominal CT.    -PNT removed 10/13/23  -Subsequent U/S 3/1/24 with mild hydronephrosis. Recommend double voiding     - Biliary colic:              -Went to the ED on 9/8/23 and was diagnosed with choleilithisis. She recently re-introduced fat into her diet and since 11/19 she has been having RUQ pain a few hours after eating as well as watery stool              -Seen by Dr. Johnson with transplant surgery on 12/18/23 with HIDA obtained on 12/22/23 that was negative for cholecystitis and biliary dyskinesia              -Plan was to have  lap shital if patient wants to proceed. Now pt have RUQ pain, with not much intensity but present every other day. Will treated possible GERD symptoms associated with it, if now improving by next month or so, will refer her back to Dr. Johnson for possible cholecystectomy.  - H/o Norovirus:              -She was found to have + norovirus on 11/29/24. She is also colonized with C.diff. MPA was decreased for 10d and prescribed nitazoxanide but insurance did not cover. Now symptoms resolved  - Pregnancy/Preeclampsia: Patient delivered at 34 weeks with some preeclampsia symptoms.    - GERD: off medications, have mixed symptoms with biliary colic. Will start on famotidine 20 mg BID and see if there is any improvement in pain.    - Hyperprolactinemia: Slightly elevated prolactin at 25 at last check 10/6/23.  Felt secondary to hypothyroidism.  Followed by Endocrinology.   - Right Axillary Lymph Node: Patient was  referred to general surgery for excisional biopsy in 8/2022, but decision was made to watch the node.  Node appears to be stable for the last year at least and per patient she has had it for > 5 years with no changes in size.  - Family planning:              -Per patient she may want to get pregnant again. She will think about it. I did tell her that she almost certainly would develop hydronephrosis requiring PNT again. Her Scr did increase to ~1.2 when it was 0.8-1 pre pregnancy and this could increase again.               -When she would like to get pregnant again would change back to AZA from MPA and ask her to see high risk OB again.     # Skin Cancer Risk:    - Discussed sun protection and recommend regular follow up with Dermatology.    # Transplant History:  Etiology of Kidney Failure: Unknown etiology (no kidney biopsy)  Tx: DDKT  Transplant: 8/3/2019 (Kidney)  Significant transplant-related complications: None and Transplant ureteral stenosis    Transplant Office Phone Number: [phone]    Assessment and plan was discussed with the patient and she voiced her understanding and agreement.    Return visit: Return in about 1 year (around 6/26/2026).    Amarilys Christina MD    The longitudinal plan of care for the diagnosis(es)/condition(s) as documented were addressed during this visit. Due to the added complexity in care, I will continue to support Mona in the subsequent management and with ongoing continuity of care.      Chief Complaint  Ms. Wagner is a 34 year old here for kidney transplant and immunosuppression management.     History of Present Illness    Ms. Wagner reports feeling stable overall. Pt endorsed two episodes of blood after stools, denied having black stools and un aware of any internal or external hemorrhoids. Likely need Colonoscopy and recommended to discuss with PCP. Finger referral if biliary colic pain persists.  Since last clinic visit:   Hospitalizations: No   New Medical Issues: Yes,  on going biliary colic pain  Chest pain or shortness of breath: No  Lower extremity swelling: No  Weight change: No  Nausea and vomiting: No  Diarrhea: Yes  Heartburn symptoms: No  Fever, sweats or chills: No  Urinary complaints: No    Home BP: 110's systolic      Problem List  Patient Active Problem List   Diagnosis     Acquired hypothyroidism     Hypomagnesemia     Aftercare following organ transplant     Immunosuppressed status     Kidney replaced by transplant     Vitamin D deficiency     CKD (chronic kidney disease) stage 2, GFR 60-89 ml/min     Bicornate uterus     Hydronephrosis of kidney transplant     Ureteral stenosis     Anemia in stage 2 chronic kidney disease     Calculus of gallbladder and bile duct without cholecystitis or obstruction       Allergies  Allergies   Allergen Reactions     Contrast Dye Rash     CT contrast allergy 12/14/19 rash over eyes. Need to have pre medication before a CT WITH CONTRAST      Diatrizoate Rash     CT contrast allergy 12/14/19 rash over eyes. Need to have pre medication before a CT WITH CONTRAST      Lisinopril Swelling     angioedema     Nitrous Oxide Other (See Comments)     Sense of doom     Sulfa Antibiotics Rash     Muscle stiffness of neck     Dapsone      Methemoglobinemia     Furosemide Other (See Comments)     Skin flushing     Metrogel [Metronidazole]      Hives diffusely on body after vaginal administration.     Azithromycin Dizziness and Rash     Cefuroxime Rash       Medications  Current Outpatient Medications   Medication Sig Dispense Refill     acetaminophen (TYLENOL) 325 MG tablet TAKE 2 TABLETS(650 MG) BY MOUTH EVERY 4 HOURS AS NEEDED FOR MILD PAIN 100 tablet 3     lactase (LACTAID) 3000 UNIT tablet Take 3,000 Units by mouth 3 times daily (with meals)       MAGNESIUM OXIDE 400 (240 Mg) MG tablet TAKE 1 TABLET(400 MG) BY MOUTH EVERY MORNING 90 tablet 3     mycophenolic acid (GENERIC EQUIVALENT) 180 MG EC tablet Take 3 tablets (540 mg) by mouth 2 times  daily. 180 tablet 11     norethindrone (MICRONOR) 0.35 MG tablet Take 1 tablet (0.35 mg) by mouth daily. 84 tablet 3     patiromer (VELTASSA) 8.4 g packet Take 8.4 g by mouth daily as needed (as directed by your transplant team, for potassium = or > 5.5)       Prenatal Vit-Fe Fumarate-FA (PRENATAL MULTIVITAMIN W/IRON) 27-0.8 MG tablet Take 1 tablet by mouth every other day 90 tablet 1     simethicone (MYLICON) 125 MG chewable tablet Take 1 tablet (125 mg) by mouth daily as needed for intestinal gas. 90 tablet 3     sodium bicarbonate 650 MG tablet Take 2 tablets (1,300 mg) by mouth 3 times daily. 180 tablet 11     tacrolimus (GENERIC EQUIVALENT) 0.5 MG capsule Take 1 capsule (0.5 mg) by mouth 2 times daily. Total dose 2.5 mg twice daily 60 capsule 11     tacrolimus (GENERIC EQUIVALENT) 1 MG capsule Take 2 capsules (2 mg) by mouth 2 times daily. Total dose 2.5 mg twice daily 120 capsule 11     vitamin D3 (CHOLECALCIFEROL) 50 mcg (2000 units) tablet Take 1 tablet (50 mcg) by mouth every morning. 90 tablet 3     cyclobenzaprine (FLEXERIL) 5 MG tablet Take 1 tablet (5 mg) by mouth 3 times daily as needed for muscle spasms. (Patient not taking: Reported on 4/7/2025) 12 tablet 0     ketoconazole (NIZORAL) 2 % external shampoo Apply topically three times a week. Lather in the shower, wait 3-5 minutes before rinsing. 120 mL 11     Current Facility-Administered Medications   Medication Dose Route Frequency Provider Last Rate Last Admin     sodium chloride (PF) 0.9% PF flush 3 mL  3 mL Intravenous q1 min prn          Facility-Administered Medications Ordered in Other Visits   Medication Dose Route Frequency Provider Last Rate Last Admin     iopamidol (ISOVUE-250) solution 100 mL  100 mL Tube Once Trav Silva PA-C         There are no discontinued medications.    Physical Exam  Vital Signs: /76 (BP Location: Right arm, Patient Position: Sitting, Cuff Size: Adult Regular)   Pulse 79   Temp 97.9  F (36.6   C) (Oral)   Wt 70.3 kg (155 lb)   SpO2 98%   BMI 29.77 kg/m      GENERAL APPEARANCE: alert and no distress  EYES: eyes grossly normal to inspection  HENT: normal cephalic/atraumatic  RESP: lungs clear to auscultation - no rales, rhonchi or wheezes  CV: regular rhythm, normal rate  EDEMA: no LE edema bilaterally  ABDOMEN: soft, nondistended, nontender  MS: extremities normal - no gross deformities noted  SKIN: no rash  NEURO: mentation intact and speech normal  PSYCH: mentation appears normal and affect normal/bright  TX KIDNEY: normal    Data        Latest Ref Rng & Units 6/18/2025     9:34 AM 4/23/2025    10:19 AM 4/14/2025     9:50 AM   Renal   Sodium 135 - 145 mmol/L 136  138  140    K 3.4 - 5.3 mmol/L 4.3  4.5  4.5    Cl 98 - 107 mmol/L 102  108  106    Cl (external) 98 - 107 mmol/L 102  108  106    CO2 22 - 29 mmol/L 26  24  25    Urea Nitrogen 6.0 - 20.0 mg/dL 23.7  20.5  30.7    Creatinine 0.51 - 0.95 mg/dL 1.01  1.07  1.19    Glucose 70 - 99 mg/dL 100  104  99    Calcium 8.8 - 10.4 mg/dL 9.5  9.2  9.3          Latest Ref Rng & Units 10/3/2023     9:52 AM 7/13/2023    10:15 AM 6/15/2023    10:09 AM   Bone Health   Phosphorus 2.5 - 4.5 mg/dL  3.3  3.6    Vit D Def 20 - 50 ng/mL 46            Latest Ref Rng & Units 6/18/2025     9:34 AM 4/23/2025    10:19 AM 4/14/2025     9:50 AM   Heme   WBC 4.0 - 11.0 10e3/uL 7.0  6.4  6.8    Hgb 11.7 - 15.7 g/dL 11.2  11.4  11.8    Plt 150 - 450 10e3/uL 184  211  206          Latest Ref Rng & Units 11/29/2023     9:02 AM 9/8/2023     2:34 AM 8/14/2023    12:49 PM   Liver   AP 40 - 150 U/L 65  79  112    TBili <=1.2 mg/dL 0.8  0.5  0.7    ALT 0 - 50 U/L 25  15  36    AST 0 - 45 U/L 17  19  36    Tot Protein 6.4 - 8.3 g/dL 8.1  7.2  6.3    Albumin 3.5 - 5.2 g/dL 4.6  4.1  3.2          Latest Ref Rng & Units 10/16/2024     9:54 AM 2/22/2024     9:43 AM 11/29/2023     9:02 AM   Pancreas   A1C <5.7 % 5.5  5.8     Lipase (Roche) 13 - 60 U/L   41          Latest Ref Rng & Units  10/18/2023     9:32 AM 10/3/2023     9:52 AM 7/31/2023    10:17 AM   Iron studies   Iron 37 - 145 ug/dL 56  40  94    Iron Sat Index 15 - 46 % 26  18  35    Ferritin 6 - 175 ng/mL 771  808  1,525          Latest Ref Rng & Units 7/7/2021     9:20 AM 6/2/2021     9:25 AM 5/3/2021     9:02 AM   [facility] Txp Virology   BK Spec  Plasma  Plasma  Plasma    BK Res BKNEG^BK Virus DNA Not Detected copies/mL BK Virus DNA Not Detected  BK Virus DNA Not Detected  BK Virus DNA Not Detected    BK Log <2.7 Log copies/mL Not Calculated  Not Calculated  Not Calculated      Failed to redirect to the Timeline version of the REVFS SmartLink.  Recent Labs   Lab Test 04/14/25  0950 04/23/25  1019 06/18/25  0934   DOSTAC 4/13/2025 4/22/2025 6/17/2025   TACROL 6.6 5.9 6.5     Recent Labs   Lab Test 08/16/19  0807 09/03/19  0944   DOSMPA Not Provided 0840pm   MPACID 1.92 3.39   MPAG 149.2* 52.8    Prescription drug management  40 minutes spent by me on the date of the encounter doing chart review, history and exam, documentation and further activities per the note    Again, thank you for allowing me to participate in the care of your patient.        Sincerely,        Amarilys Christina MD    Electronically signed

## 2025-06-26 NOTE — PROGRESS NOTES
TRANSPLANT NEPHROLOGY CLINIC VISIT     Assessment & Plan   # DDKT: CKD Stage 2 - Stable   - Baseline Creatinine: ~ 1-1.3   - Proteinuria: Normal (<0.2 grams)   - DSA Hx: No DSA   - Last cPRA: 26%   - BK Viremia: No   - Kidney Tx Biopsy Hx: No history of acute rejection.   - Primary Nephrologist: None.    # Immunosuppression: Tacrolimus immediate release (goal 4-6) and Mycophenolic acid (dose 540 mg every 12 hours)   - Induction with Recent Transplant:  High Intensity Protocol   - Continue with intensive monitoring of immunosuppression for efficacy and toxicity.   - Historical Changes in Immunosuppression: Changed off azathioprine back to mycophenolic acid following pregnancy.   - Changes: Not at this time    # Infection Prevention:   Last CD4 Level: 201 (7/2020)  - PJP: None      - CMV IgG Ab High Risk Discordance (D+/R-) at time of transplant: No  Present CMV Serostatus: Positive  - EBV IgG Ab High Risk Discordance (D+/R-) at time of transplant: No  Present EBV Serostatus: Positive    # Hypertension: Controlled;  Goal BP: < 130/80   - Changes: Not at this time    # Anemia in Chronic Renal Disease: Hgb: Stable, near normal      CHARLINE: No   - Iron studies: Replete    # Mineral Bone Disorder:    - Secondary renal hyperparathyroidism; PTH level: low        On treatment: None  - Vitamin D; level: Not checked recently, but was normal last check        On supplement: Yes  - Calcium; level: Normal        On supplement: No    # Electrolytes:  - Potassium; level: Normal        On supplement: No  - Magnesium; level: Low        On supplement: Yes  - Bicarbonate; level: Normal        On supplement: No    # Other Significant PMH:   - Kidney Transplant Ureteral Stenosis/Hydronephrosis:   -Patient with moderate hydronephrosis of kidney transplant during pregnancyand developed CAMERON.  She underwent PNT 6/9/23 with repeat kidney transplant ultrasound 6/11/23 still showing moderate hydronephrosis suggesting this may be a functional  hydronephrosis from pregnancy  - She has a previous h/o ureteral stenosis with ureteral stent removed 2/2021.  Previous kidney transplant ultrasound 7/2021 also showed moderate hydronephrosis unchanged with voiding. Hydronephrosis was unchanged on 7/6/22 abdominal CT.    -PNT removed 10/13/23  -Subsequent U/S 3/1/24 with mild hydronephrosis. Recommend double voiding     - Biliary colic:              -Went to the ED on 9/8/23 and was diagnosed with choleilithisis. She recently re-introduced fat into her diet and since 11/19 she has been having RUQ pain a few hours after eating as well as watery stool              -Seen by Dr. Johnson with transplant surgery on 12/18/23 with HIDA obtained on 12/22/23 that was negative for cholecystitis and biliary dyskinesia              -Plan was to have  lap shital if patient wants to proceed. Now pt have RUQ pain, with not much intensity but present every other day. Will treated possible GERD symptoms associated with it, if now improving by next month or so, will refer her back to Dr. Johnson for possible cholecystectomy.  - H/o Norovirus:              -She was found to have + norovirus on 11/29/24. She is also colonized with C.diff. MPA was decreased for 10d and prescribed nitazoxanide but insurance did not cover. Now symptoms resolved  - Pregnancy/Preeclampsia: Patient delivered at 34 weeks with some preeclampsia symptoms.    - GERD: off medications, have mixed symptoms with biliary colic. Will start on famotidine 20 mg BID and see if there is any improvement in pain.    - Hyperprolactinemia: Slightly elevated prolactin at 25 at last check 10/6/23.  Felt secondary to hypothyroidism.  Followed by Endocrinology.   - Right Axillary Lymph Node: Patient was referred to general surgery for excisional biopsy in 8/2022, but decision was made to watch the node.  Node appears to be stable for the last year at least and per patient she has had it for > 5 years with no changes in size.  -  Family planning:              -Per patient she may want to get pregnant again. She will think about it. I did tell her that she almost certainly would develop hydronephrosis requiring PNT again. Her Scr did increase to ~1.2 when it was 0.8-1 pre pregnancy and this could increase again.               -When she would like to get pregnant again would change back to AZA from MPA and ask her to see high risk OB again.     # Skin Cancer Risk:    - Discussed sun protection and recommend regular follow up with Dermatology.    # Transplant History:  Etiology of Kidney Failure: Unknown etiology (no kidney biopsy)  Tx: DDKT  Transplant: 8/3/2019 (Kidney)  Significant transplant-related complications: None and Transplant ureteral stenosis    Transplant Office Phone Number: [phone]    Assessment and plan was discussed with the patient and she voiced her understanding and agreement.    Return visit: Return in about 1 year (around 6/26/2026).    Amarilys Christina MD    The longitudinal plan of care for the diagnosis(es)/condition(s) as documented were addressed during this visit. Due to the added complexity in care, I will continue to support Mona in the subsequent management and with ongoing continuity of care.      Chief Complaint   Ms. Wagner is a 34 year old here for kidney transplant and immunosuppression management.     History of Present Illness     Ms. Wagner reports feeling stable overall. Pt endorsed two episodes of blood after stools, denied having black stools and un aware of any internal or external hemorrhoids. Likely need Colonoscopy and recommended to discuss with PCP. Finger referral if biliary colic pain persists.  Since last clinic visit:   Hospitalizations: No   New Medical Issues: Yes, on going biliary colic pain  Chest pain or shortness of breath: No  Lower extremity swelling: No  Weight change: No  Nausea and vomiting: No  Diarrhea: Yes  Heartburn symptoms: No  Fever, sweats or chills: No  Urinary  complaints: No    Home BP: 110's systolic      Problem List   Patient Active Problem List   Diagnosis    Acquired hypothyroidism    Hypomagnesemia    Aftercare following organ transplant    Immunosuppressed status    Kidney replaced by transplant    Vitamin D deficiency    CKD (chronic kidney disease) stage 2, GFR 60-89 ml/min    Bicornate uterus    Hydronephrosis of kidney transplant    Ureteral stenosis    Anemia in stage 2 chronic kidney disease    Calculus of gallbladder and bile duct without cholecystitis or obstruction       Allergies   Allergies   Allergen Reactions    Contrast Dye Rash     CT contrast allergy 12/14/19 rash over eyes. Need to have pre medication before a CT WITH CONTRAST     Diatrizoate Rash     CT contrast allergy 12/14/19 rash over eyes. Need to have pre medication before a CT WITH CONTRAST     Lisinopril Swelling     angioedema    Nitrous Oxide Other (See Comments)     Sense of doom    Sulfa Antibiotics Rash     Muscle stiffness of neck    Dapsone      Methemoglobinemia    Furosemide Other (See Comments)     Skin flushing    Metrogel [Metronidazole]      Hives diffusely on body after vaginal administration.    Azithromycin Dizziness and Rash    Cefuroxime Rash       Medications   Current Outpatient Medications   Medication Sig Dispense Refill    acetaminophen (TYLENOL) 325 MG tablet TAKE 2 TABLETS(650 MG) BY MOUTH EVERY 4 HOURS AS NEEDED FOR MILD PAIN 100 tablet 3    lactase (LACTAID) 3000 UNIT tablet Take 3,000 Units by mouth 3 times daily (with meals)      MAGNESIUM OXIDE 400 (240 Mg) MG tablet TAKE 1 TABLET(400 MG) BY MOUTH EVERY MORNING 90 tablet 3    mycophenolic acid (GENERIC EQUIVALENT) 180 MG EC tablet Take 3 tablets (540 mg) by mouth 2 times daily. 180 tablet 11    norethindrone (MICRONOR) 0.35 MG tablet Take 1 tablet (0.35 mg) by mouth daily. 84 tablet 3    patiromer (VELTASSA) 8.4 g packet Take 8.4 g by mouth daily as needed (as directed by your transplant team, for potassium =  or > 5.5)      Prenatal Vit-Fe Fumarate-FA (PRENATAL MULTIVITAMIN W/IRON) 27-0.8 MG tablet Take 1 tablet by mouth every other day 90 tablet 1    simethicone (MYLICON) 125 MG chewable tablet Take 1 tablet (125 mg) by mouth daily as needed for intestinal gas. 90 tablet 3    sodium bicarbonate 650 MG tablet Take 2 tablets (1,300 mg) by mouth 3 times daily. 180 tablet 11    tacrolimus (GENERIC EQUIVALENT) 0.5 MG capsule Take 1 capsule (0.5 mg) by mouth 2 times daily. Total dose 2.5 mg twice daily 60 capsule 11    tacrolimus (GENERIC EQUIVALENT) 1 MG capsule Take 2 capsules (2 mg) by mouth 2 times daily. Total dose 2.5 mg twice daily 120 capsule 11    vitamin D3 (CHOLECALCIFEROL) 50 mcg (2000 units) tablet Take 1 tablet (50 mcg) by mouth every morning. 90 tablet 3    cyclobenzaprine (FLEXERIL) 5 MG tablet Take 1 tablet (5 mg) by mouth 3 times daily as needed for muscle spasms. (Patient not taking: Reported on 4/7/2025) 12 tablet 0    ketoconazole (NIZORAL) 2 % external shampoo Apply topically three times a week. Lather in the shower, wait 3-5 minutes before rinsing. 120 mL 11     Current Facility-Administered Medications   Medication Dose Route Frequency Provider Last Rate Last Admin    sodium chloride (PF) 0.9% PF flush 3 mL  3 mL Intravenous q1 min prn          Facility-Administered Medications Ordered in Other Visits   Medication Dose Route Frequency Provider Last Rate Last Admin    iopamidol (ISOVUE-250) solution 100 mL  100 mL Tube Once Trav Silva PA-C         There are no discontinued medications.    Physical Exam   Vital Signs: /76 (BP Location: Right arm, Patient Position: Sitting, Cuff Size: Adult Regular)   Pulse 79   Temp 97.9  F (36.6  C) (Oral)   Wt 70.3 kg (155 lb)   SpO2 98%   BMI 29.77 kg/m      GENERAL APPEARANCE: alert and no distress  EYES: eyes grossly normal to inspection  HENT: normal cephalic/atraumatic  RESP: lungs clear to auscultation - no rales, rhonchi or  wheezes  CV: regular rhythm, normal rate  EDEMA: no LE edema bilaterally  ABDOMEN: soft, nondistended, nontender  MS: extremities normal - no gross deformities noted  SKIN: no rash  NEURO: mentation intact and speech normal  PSYCH: mentation appears normal and affect normal/bright  TX KIDNEY: normal    Data         Latest Ref Rng & Units 6/18/2025     9:34 AM 4/23/2025    10:19 AM 4/14/2025     9:50 AM   Renal   Sodium 135 - 145 mmol/L 136  138  140    K 3.4 - 5.3 mmol/L 4.3  4.5  4.5    Cl 98 - 107 mmol/L 102  108  106    Cl (external) 98 - 107 mmol/L 102  108  106    CO2 22 - 29 mmol/L 26  24  25    Urea Nitrogen 6.0 - 20.0 mg/dL 23.7  20.5  30.7    Creatinine 0.51 - 0.95 mg/dL 1.01  1.07  1.19    Glucose 70 - 99 mg/dL 100  104  99    Calcium 8.8 - 10.4 mg/dL 9.5  9.2  9.3          Latest Ref Rng & Units 10/3/2023     9:52 AM 7/13/2023    10:15 AM 6/15/2023    10:09 AM   Bone Health   Phosphorus 2.5 - 4.5 mg/dL  3.3  3.6    Vit D Def 20 - 50 ng/mL 46            Latest Ref Rng & Units 6/18/2025     9:34 AM 4/23/2025    10:19 AM 4/14/2025     9:50 AM   Heme   WBC 4.0 - 11.0 10e3/uL 7.0  6.4  6.8    Hgb 11.7 - 15.7 g/dL 11.2  11.4  11.8    Plt 150 - 450 10e3/uL 184  211  206          Latest Ref Rng & Units 11/29/2023     9:02 AM 9/8/2023     2:34 AM 8/14/2023    12:49 PM   Liver   AP 40 - 150 U/L 65  79  112    TBili <=1.2 mg/dL 0.8  0.5  0.7    ALT 0 - 50 U/L 25  15  36    AST 0 - 45 U/L 17  19  36    Tot Protein 6.4 - 8.3 g/dL 8.1  7.2  6.3    Albumin 3.5 - 5.2 g/dL 4.6  4.1  3.2          Latest Ref Rng & Units 10/16/2024     9:54 AM 2/22/2024     9:43 AM 11/29/2023     9:02 AM   Pancreas   A1C <5.7 % 5.5  5.8     Lipase (Roche) 13 - 60 U/L   41          Latest Ref Rng & Units 10/18/2023     9:32 AM 10/3/2023     9:52 AM 7/31/2023    10:17 AM   Iron studies   Iron 37 - 145 ug/dL 56  40  94    Iron Sat Index 15 - 46 % 26  18  35    Ferritin 6 - 175 ng/mL 771  808  1,525          Latest Ref Rng & Units 7/7/2021      9:20 AM 6/2/2021     9:25 AM 5/3/2021     9:02 AM   [facility] Txp Virology   BK Spec  Plasma  Plasma  Plasma    BK Res BKNEG^BK Virus DNA Not Detected copies/mL BK Virus DNA Not Detected  BK Virus DNA Not Detected  BK Virus DNA Not Detected    BK Log <2.7 Log copies/mL Not Calculated  Not Calculated  Not Calculated      Failed to redirect to the Timeline version of the [facility].  Recent Labs   Lab Test 04/14/25  0950 04/23/25  1019 06/18/25  0934   DOSTAC 4/13/2025 4/22/2025 6/17/2025   TACROL 6.6 5.9 6.5     Recent Labs   Lab Test 08/16/19  0807 09/03/19  0944   DOSMPA Not Provided 0840pm   MPACID 1.92 3.39   MPAG 149.2* 52.8    Prescription drug management  40 minutes spent by me on the date of the encounter doing chart review, history and exam, documentation and further activities per the note

## 2025-06-26 NOTE — NURSING NOTE
Chief Complaint   Patient presents with    RECHECK     K/P POST RETURN TXP NEPH - follow up         BP Readings from Last 3 Encounters:   06/26/25 118/76   04/07/25 111/73   02/13/25 122/79       /76 (BP Location: Right arm, Patient Position: Sitting, Cuff Size: Adult Regular)   Pulse 79   Temp 97.9  F (36.6  C) (Oral)   Wt 70.3 kg (155 lb)   SpO2 98%   BMI 29.77 kg/m       Lia Peng

## 2025-07-03 ENCOUNTER — RESULTS FOLLOW-UP (OUTPATIENT)
Dept: TRANSPLANT | Facility: CLINIC | Age: 35
End: 2025-07-03

## 2025-07-03 ENCOUNTER — LAB (OUTPATIENT)
Dept: LAB | Facility: HOSPITAL | Age: 35
End: 2025-07-03
Payer: MEDICARE

## 2025-07-03 DIAGNOSIS — E03.9 ACQUIRED HYPOTHYROIDISM: ICD-10-CM

## 2025-07-03 DIAGNOSIS — D84.9 IMMUNOSUPPRESSED STATUS: ICD-10-CM

## 2025-07-03 DIAGNOSIS — Z94.0 KIDNEY REPLACED BY TRANSPLANT: ICD-10-CM

## 2025-07-03 LAB
ANION GAP SERPL CALCULATED.3IONS-SCNC: 9 MMOL/L (ref 7–15)
BUN SERPL-MCNC: 21.4 MG/DL (ref 6–20)
CALCIUM SERPL-MCNC: 9.7 MG/DL (ref 8.8–10.4)
CHLORIDE SERPL-SCNC: 106 MMOL/L (ref 98–107)
CREAT SERPL-MCNC: 1.01 MG/DL (ref 0.51–0.95)
EGFRCR SERPLBLD CKD-EPI 2021: 75 ML/MIN/1.73M2
ERYTHROCYTE [DISTWIDTH] IN BLOOD BY AUTOMATED COUNT: 12.8 % (ref 10–15)
GLUCOSE SERPL-MCNC: 97 MG/DL (ref 70–99)
HCO3 SERPL-SCNC: 23 MMOL/L (ref 22–29)
HCT VFR BLD AUTO: 36.5 % (ref 35–47)
HGB BLD-MCNC: 11.4 G/DL (ref 11.7–15.7)
MCH RBC QN AUTO: 27.5 PG (ref 26.5–33)
MCHC RBC AUTO-ENTMCNC: 31.2 G/DL (ref 31.5–36.5)
MCV RBC AUTO: 88 FL (ref 78–100)
PLATELET # BLD AUTO: 220 10E3/UL (ref 150–450)
POTASSIUM SERPL-SCNC: 4.5 MMOL/L (ref 3.4–5.3)
RBC # BLD AUTO: 4.15 10E6/UL (ref 3.8–5.2)
SODIUM SERPL-SCNC: 138 MMOL/L (ref 135–145)
T4 FREE SERPL-MCNC: 1.34 NG/DL (ref 0.9–1.7)
TACROLIMUS BLD-MCNC: 5.4 UG/L (ref 5–15)
TME LAST DOSE: NORMAL H
TME LAST DOSE: NORMAL H
TSH SERPL DL<=0.005 MIU/L-ACNC: 5.06 UIU/ML (ref 0.3–4.2)
WBC # BLD AUTO: 8.7 10E3/UL (ref 4–11)

## 2025-07-03 PROCEDURE — 85027 COMPLETE CBC AUTOMATED: CPT

## 2025-07-03 PROCEDURE — 84439 ASSAY OF FREE THYROXINE: CPT

## 2025-07-03 PROCEDURE — 84443 ASSAY THYROID STIM HORMONE: CPT

## 2025-07-03 PROCEDURE — 80048 BASIC METABOLIC PNL TOTAL CA: CPT

## 2025-07-03 PROCEDURE — 36415 COLL VENOUS BLD VENIPUNCTURE: CPT

## 2025-07-03 PROCEDURE — 80197 ASSAY OF TACROLIMUS: CPT

## 2025-07-07 DIAGNOSIS — Z94.0 KIDNEY REPLACED BY TRANSPLANT: ICD-10-CM

## 2025-07-07 DIAGNOSIS — Z94.0 KIDNEY REPLACED BY TRANSPLANT: Primary | ICD-10-CM

## 2025-07-07 RX ORDER — TACROLIMUS 1 MG/1
2 CAPSULE ORAL 2 TIMES DAILY
Qty: 120 CAPSULE | Refills: 11 | Status: SHIPPED | OUTPATIENT
Start: 2025-07-07 | End: 2025-07-07

## 2025-07-08 RX ORDER — TACROLIMUS 1 MG/1
2 CAPSULE ORAL 2 TIMES DAILY
Qty: 120 CAPSULE | Refills: 11 | Status: SHIPPED | OUTPATIENT
Start: 2025-07-08

## 2025-07-15 NOTE — PROGRESS NOTES
Called patient to reschedule appointment to Friday at 1:30 pm  will not be in office Thursday afternoon    Transfer  Transferred to: 7A  Via: Bed  Reason for transfer: Pt no longer appropriate for 6B- improving/stable patient condition  Family: Aware of transfer  Belongings: Packed and sent with pt  Chart: Delivered with pt to next unit  Medications: Meds sent to new unit with pt  Report given to: Nancy WADSWORTH  Pt status: Stable at this time. Aware and in agreement with plans for transfer.

## 2025-07-17 ENCOUNTER — TELEPHONE (OUTPATIENT)
Dept: ENDOCRINOLOGY | Facility: CLINIC | Age: 35
End: 2025-07-17
Payer: MEDICAID

## 2025-07-17 DIAGNOSIS — E03.9 ACQUIRED HYPOTHYROIDISM: Primary | ICD-10-CM

## 2025-07-17 NOTE — TELEPHONE ENCOUNTER
Has recent elevated TSH and has symptoms possibly associated with hypothyroidism.  Will have patient recheck TFTs in 2 weeks.    No visits with results within 1 Week(s) from this visit.   Latest known visit with results is:   Lab on 07/03/2025   Component Date Value Ref Range Status    TSH 07/03/2025 5.06 (H)  0.30 - 4.20 uIU/mL Final    Free T4 07/03/2025 1.34  0.90 - 1.70 ng/dL Final         -  hi im not taking any thyriod meds. and nothing with biotin

## 2025-08-06 ENCOUNTER — LAB (OUTPATIENT)
Dept: LAB | Facility: HOSPITAL | Age: 35
End: 2025-08-06
Payer: MEDICARE

## 2025-08-06 DIAGNOSIS — D84.9 IMMUNOSUPPRESSED STATUS: ICD-10-CM

## 2025-08-06 DIAGNOSIS — E03.9 ACQUIRED HYPOTHYROIDISM: ICD-10-CM

## 2025-08-06 DIAGNOSIS — Z94.0 KIDNEY TRANSPLANT RECIPIENT: ICD-10-CM

## 2025-08-06 DIAGNOSIS — Z94.0 KIDNEY REPLACED BY TRANSPLANT: ICD-10-CM

## 2025-08-06 LAB
ALBUMIN SERPL BCG-MCNC: 4.6 G/DL (ref 3.5–5.2)
ALP SERPL-CCNC: 70 U/L (ref 40–150)
ALT SERPL W P-5'-P-CCNC: 18 U/L (ref 0–50)
ANION GAP SERPL CALCULATED.3IONS-SCNC: 13 MMOL/L (ref 7–15)
AST SERPL W P-5'-P-CCNC: 16 U/L (ref 0–45)
BILIRUB SERPL-MCNC: 0.5 MG/DL
BUN SERPL-MCNC: 26 MG/DL (ref 6–20)
CALCIUM SERPL-MCNC: 9.4 MG/DL (ref 8.8–10.4)
CHLORIDE SERPL-SCNC: 107 MMOL/L (ref 98–107)
CREAT SERPL-MCNC: 1.16 MG/DL (ref 0.51–0.95)
EGFRCR SERPLBLD CKD-EPI 2021: 64 ML/MIN/1.73M2
ERYTHROCYTE [DISTWIDTH] IN BLOOD BY AUTOMATED COUNT: 13.2 % (ref 10–15)
GLUCOSE SERPL-MCNC: 107 MG/DL (ref 70–99)
HCO3 SERPL-SCNC: 22 MMOL/L (ref 22–29)
HCT VFR BLD AUTO: 37.9 % (ref 35–47)
HGB BLD-MCNC: 11.6 G/DL (ref 11.7–15.7)
MCH RBC QN AUTO: 27.5 PG (ref 26.5–33)
MCHC RBC AUTO-ENTMCNC: 30.6 G/DL (ref 31.5–36.5)
MCV RBC AUTO: 90 FL (ref 78–100)
PLATELET # BLD AUTO: 211 10E3/UL (ref 150–450)
POTASSIUM SERPL-SCNC: 4.3 MMOL/L (ref 3.4–5.3)
PROT SERPL-MCNC: 7.9 G/DL (ref 6.4–8.3)
RBC # BLD AUTO: 4.22 10E6/UL (ref 3.8–5.2)
SODIUM SERPL-SCNC: 142 MMOL/L (ref 135–145)
T3 SERPL-MCNC: 122 NG/DL (ref 85–202)
T4 FREE SERPL-MCNC: 1.2 NG/DL (ref 0.9–1.7)
TACROLIMUS BLD-MCNC: 5.2 UG/L (ref 5–15)
TME LAST DOSE: NORMAL H
TME LAST DOSE: NORMAL H
TSH SERPL DL<=0.005 MIU/L-ACNC: 3.94 UIU/ML (ref 0.3–4.2)
WBC # BLD AUTO: 7.4 10E3/UL (ref 4–11)

## 2025-08-06 PROCEDURE — 84480 ASSAY TRIIODOTHYRONINE (T3): CPT

## 2025-08-06 PROCEDURE — 84443 ASSAY THYROID STIM HORMONE: CPT

## 2025-08-06 PROCEDURE — 84439 ASSAY OF FREE THYROXINE: CPT

## 2025-08-06 PROCEDURE — 86376 MICROSOMAL ANTIBODY EACH: CPT

## 2025-08-06 PROCEDURE — 85048 AUTOMATED LEUKOCYTE COUNT: CPT

## 2025-08-06 PROCEDURE — 80197 ASSAY OF TACROLIMUS: CPT

## 2025-08-06 PROCEDURE — 82435 ASSAY OF BLOOD CHLORIDE: CPT

## 2025-08-06 PROCEDURE — 36415 COLL VENOUS BLD VENIPUNCTURE: CPT

## 2025-08-07 LAB — THYROPEROXIDASE AB SERPL-ACNC: <10 IU/ML

## 2025-08-20 ENCOUNTER — MYC REFILL (OUTPATIENT)
Dept: TRANSPLANT | Facility: CLINIC | Age: 35
End: 2025-08-20
Payer: MEDICARE

## 2025-08-20 DIAGNOSIS — Z94.0 KIDNEY REPLACED BY TRANSPLANT: ICD-10-CM

## 2025-08-20 RX ORDER — MYCOPHENOLIC ACID 180 MG/1
540 TABLET, DELAYED RELEASE ORAL 2 TIMES DAILY
Qty: 180 TABLET | Refills: 11 | Status: SHIPPED | OUTPATIENT
Start: 2025-08-20

## 2025-08-25 ENCOUNTER — OFFICE VISIT (OUTPATIENT)
Dept: FAMILY MEDICINE | Facility: CLINIC | Age: 35
End: 2025-08-25
Payer: MEDICARE

## 2025-08-25 VITALS
HEIGHT: 60 IN | RESPIRATION RATE: 18 BRPM | BODY MASS INDEX: 30.43 KG/M2 | DIASTOLIC BLOOD PRESSURE: 74 MMHG | WEIGHT: 155 LBS | TEMPERATURE: 98 F | HEART RATE: 81 BPM | OXYGEN SATURATION: 97 % | SYSTOLIC BLOOD PRESSURE: 122 MMHG

## 2025-08-25 DIAGNOSIS — D63.1 ANEMIA IN STAGE 2 CHRONIC KIDNEY DISEASE: ICD-10-CM

## 2025-08-25 DIAGNOSIS — N18.2 ANEMIA IN STAGE 2 CHRONIC KIDNEY DISEASE: ICD-10-CM

## 2025-08-25 DIAGNOSIS — K80.50 BILIARY COLIC: Primary | ICD-10-CM

## 2025-08-25 DIAGNOSIS — R10.13 ABDOMINAL PAIN, EPIGASTRIC: ICD-10-CM

## 2025-08-25 DIAGNOSIS — K62.5 RECTAL BLEEDING: ICD-10-CM

## 2025-08-25 RX ORDER — PRENATAL VIT/IRON FUM/FOLIC AC 27MG-0.8MG
1 TABLET ORAL DAILY
Qty: 90 TABLET | Refills: 3 | Status: SHIPPED | OUTPATIENT
Start: 2025-08-25

## 2025-08-25 RX ORDER — WHEAT DEXTRIN 3 G/3.8 G
POWDER (GRAM) ORAL
Qty: 500 G | Refills: 1 | Status: SHIPPED | OUTPATIENT
Start: 2025-08-25

## 2025-08-27 LAB — H PYLORI AG STL QL IA: NEGATIVE

## 2025-08-28 ENCOUNTER — ANCILLARY PROCEDURE (OUTPATIENT)
Dept: ULTRASOUND IMAGING | Facility: CLINIC | Age: 35
End: 2025-08-28
Attending: STUDENT IN AN ORGANIZED HEALTH CARE EDUCATION/TRAINING PROGRAM
Payer: MEDICARE

## (undated) DEVICE — DRAIN JACKSON PRATT RESERVOIR 100ML SU130-1305

## (undated) DEVICE — Device

## (undated) DEVICE — GLOVE PROTEXIS W/NEU-THERA 8.5  2D73TE85

## (undated) DEVICE — ESU PENCIL W/SMOKE EVAC NEPTUNE STRYKER 0703-046-000

## (undated) DEVICE — DECANTER BAG 2002S

## (undated) DEVICE — ENDO SEAL BX PORT BPS-A

## (undated) DEVICE — SU PROLENE 6-0 RB-2DA 30" 8711H

## (undated) DEVICE — SOL NACL 0.9% IRRIG 3000ML BAG 2B7477

## (undated) DEVICE — SU MONOCRYL 4-0 PS-2 27" UND Y426H

## (undated) DEVICE — CATH URETERAL DUAL LUMEN 10FRX54CM M0064051000

## (undated) DEVICE — SOL NACL 0.9% IRRIG 1000ML BOTTLE 2F7124

## (undated) DEVICE — PAD CHUX UNDERPAD 23X24" 7136

## (undated) DEVICE — SYR 01ML 27GA 0.5" NDL TBC 309623

## (undated) DEVICE — SU SILK 3-0 SH CR 8X18" C013D

## (undated) DEVICE — NDL COUNTER 20CT 31142493

## (undated) DEVICE — WIPES FOLEY CARE SURESTEP PROVON DFC100

## (undated) DEVICE — CATH ANGIO SELECTIVE 5FRX65CM J BENTSON H787107341015

## (undated) DEVICE — PREP CHLORAPREP 26ML TINTED ORANGE  260815

## (undated) DEVICE — INSERT FOGARTY 33MM TRACTION HYDRAJAW HYDRA33

## (undated) DEVICE — SUCTION MANIFOLD DORNOCH ULTRA CART UL-CL500

## (undated) DEVICE — DEVICE CATH STABILIZATION STATLOCK FOLEY 3-WAY FOL0105

## (undated) DEVICE — LINEN GOWN X4 5410

## (undated) DEVICE — LINEN TOWEL PACK X5 5464

## (undated) DEVICE — PREP POVIDONE-IODINE 7.5% SCRUB 4OZ BOTTLE MDS093945

## (undated) DEVICE — SU PROLENE 5-0 RB-1DA 36"  8556H

## (undated) DEVICE — DRAPE ISOLATION BAG 1003

## (undated) DEVICE — NDL BX MONOPTY 18GAX16CM 121816

## (undated) DEVICE — SU SILK 4-0 TIE 12X30" A303H

## (undated) DEVICE — GUIDEWIRE SENSOR DUAL FLEX ANG 0.035"X150CM M0066703010

## (undated) DEVICE — DRAPE U SPLIT 74X120" 29440

## (undated) DEVICE — LINEN TOWEL PACK X6 WHITE 5487

## (undated) DEVICE — SYR 50ML LL W/O NDL 309653

## (undated) DEVICE — GLOVE PROTEXIS W/NEU-THERA 8.0  2D73TE80

## (undated) DEVICE — SOL NACL 0.9% INJ 1000ML BAG 2B1324X

## (undated) DEVICE — CATH PLUG W/CAP 000076

## (undated) DEVICE — SU SILK 3-0 TIE 12X30" A304H

## (undated) DEVICE — PUMP SYSTEM SINGLE ACTION M0067201000

## (undated) DEVICE — SU PROLENE 4-0 RB-1DA VISI-BLACK NDL 36" 8357H

## (undated) DEVICE — SU VICRYL 3-0 SH 27" J316H

## (undated) DEVICE — GUIDEWIRE SENSOR DUAL FLEX STR 0.035"X150CM M0066703080

## (undated) DEVICE — SU PDS II 0 TP-1 60" Z991G

## (undated) DEVICE — STRAP KNEE/BODY 31143004

## (undated) DEVICE — CATH URETERAL OPEN END 5FRX70CM M0064002010

## (undated) DEVICE — GUIDEWIRE AMPLATZ 0.035"X145CM SUPER STIFF G53566

## (undated) DEVICE — TUBING SUCTION MEDI-VAC SOFT 3/16"X20' N520A

## (undated) DEVICE — PAD CHUX UNDERPAD 30X30"

## (undated) DEVICE — BLADE CLIPPER SGL USE 9680

## (undated) DEVICE — LINEN TOWEL PACK X30 5481

## (undated) DEVICE — POSITIONER HEAD DONUT FOAM 9" LF FP-HEAD9

## (undated) DEVICE — PREP POVIDONE-IODINE 10% SOLUTION 4OZ BOTTLE MDS093944

## (undated) DEVICE — GOWN XLG DISP 9545

## (undated) DEVICE — DRSG PRIMAPORE 02X3" 7133

## (undated) DEVICE — SU SILK 2-0 TIE 12X30" A305H

## (undated) DEVICE — DRAPE SLUSH/WARMER 66X44" ORS-320

## (undated) DEVICE — GLOVE BIOGEL PI MICRO SZ 6.5 48565

## (undated) DEVICE — SURGICEL ABSORBABLE HEMOSTAT SNOW 4"X4" 2083

## (undated) DEVICE — DRAPE C-ARM W/STRAPS 42X72" 07-CA104

## (undated) DEVICE — SOL WATER IRRIG 1000ML BOTTLE 2F7114

## (undated) DEVICE — SPECIMEN CONTAINER 5OZ STERILE 2600SA

## (undated) DEVICE — ESU GROUND PAD UNIVERSAL W/O CORD

## (undated) DEVICE — ESU ELEC BLADE 2.75" COATED/INSULATED E1455

## (undated) DEVICE — PAD CHUX UNDERPAD 30X36" P3036C

## (undated) DEVICE — ESU GROUND PAD ADULT W/CORD E7507

## (undated) DEVICE — NIM ELEC SUBDERMAL NDL PAIRED 2 CHANNEL 8227410

## (undated) DEVICE — SYR 10ML LL W/O NDL 302995

## (undated) DEVICE — SUCTION MANIFOLD NEPTUNE 2 SYS 1 PORT 702-025-000

## (undated) DEVICE — ADH SKIN CLOSURE PREMIERPRO EXOFIN 1.0ML 3470

## (undated) DEVICE — DRSG MEDIPORE 3 1/2X13 3/4" 3573

## (undated) DEVICE — CONNECTOR WATER VALVE PERFUSION PACK STR 020272801

## (undated) DEVICE — GLOVE SENSICARE 7.0 MSG1070 LATEX FREE

## (undated) DEVICE — CONTAINER SPECIMEN 4OZ STERILE 17099

## (undated) DEVICE — SU PDS II 4-0 RB-1 27" Z304H

## (undated) DEVICE — DECANTER VIAL 2006S

## (undated) DEVICE — PITCHER STERILE 1000ML  SSK9004A

## (undated) DEVICE — DRSG KERLIX 4 1/2"X4YDS ROLL 6730

## (undated) DEVICE — SYR EAR BULB 3OZ 0035830

## (undated) DEVICE — SU CHROMIC 3-0 SH 27" G122H

## (undated) DEVICE — COVER CAMERA IN-LIGHT DISP LT-C02

## (undated) DEVICE — SU ETHILON 3-0 PS-1 18" 1663H

## (undated) DEVICE — PACK CYSTO CUSTOM ASC

## (undated) DEVICE — LINEN ORTHO PACK 5446

## (undated) DEVICE — SU MONOCRYL 5-0 P-3 18" UND Y493G

## (undated) DEVICE — DRSG GAUZE 4X4" TRAY 6939

## (undated) DEVICE — ESU CORD BIPOLAR GREEN 10-4000

## (undated) DEVICE — SU DERMABOND ADVANCED .7ML DNX12

## (undated) DEVICE — CLIP HORIZON SM RED WIDE SLOT 001201

## (undated) DEVICE — SPONGE KITTNER 31001010

## (undated) DEVICE — SYR 30ML LL W/O NDL 302832

## (undated) DEVICE — CATH URETERAL OPEN END 05FR 70CM 020015

## (undated) DEVICE — CATH ANGIO JB1 SOFT-VU 5FRX65CM MARINER 11734101

## (undated) DEVICE — SYR PISTON URETHRAL 60ML 68000

## (undated) DEVICE — TRAY PREP DRY SKIN SCRUB 067

## (undated) DEVICE — DRSG TELFA 3X8" 1238

## (undated) DEVICE — SU SILK 1 TIE 6X30" A307H

## (undated) DEVICE — CATH FOLEY 3WAY 16FR 30ML SIL 73016SI

## (undated) DEVICE — RAD RX CONRAY 60% (50ML) CHARGE PER ML

## (undated) DEVICE — DRAPE MAYO STAND 23X54 8337

## (undated) DEVICE — PACK CYSTO UMMC CUSTOM

## (undated) DEVICE — WIRE GUIDE 0.025"X150CM TERUMO GLIDEWIRE STR GR2501

## (undated) DEVICE — SU SILK 0 TIE 6X30" A306H

## (undated) DEVICE — SURGICEL FIBRILLAR HEMOSTAT 1"X2" 1961

## (undated) DEVICE — TUBING IRRIG CYSTO/BLADDER SET 81" LF 2C4040

## (undated) DEVICE — SU PDS II 6-0 RB-2DA 30" Z149H

## (undated) DEVICE — SU PDS II 5-0 RB-1 27" Z303H

## (undated) DEVICE — SHEATH URETERAL ACCESS NAVIGATOR HD 12/14FRX36CM M0062502250

## (undated) DEVICE — ESU ELEC NDL 1" COATED/INSULATED E1465

## (undated) DEVICE — DRAIN JACKSON PRATT ROUND SIL 19FR W/TROCAR LF JP-2232

## (undated) DEVICE — NIM PROBE PRASS INCREMENTING TIP 8225825

## (undated) RX ORDER — HYDROMORPHONE HYDROCHLORIDE 1 MG/ML
INJECTION, SOLUTION INTRAMUSCULAR; INTRAVENOUS; SUBCUTANEOUS
Status: DISPENSED
Start: 2019-08-03

## (undated) RX ORDER — ALBUTEROL SULFATE 0.83 MG/ML
SOLUTION RESPIRATORY (INHALATION)
Status: DISPENSED
Start: 2019-11-19

## (undated) RX ORDER — FENTANYL CITRATE 50 UG/ML
INJECTION, SOLUTION INTRAMUSCULAR; INTRAVENOUS
Status: DISPENSED
Start: 2023-04-28

## (undated) RX ORDER — FENTANYL CITRATE 50 UG/ML
INJECTION, SOLUTION INTRAMUSCULAR; INTRAVENOUS
Status: DISPENSED
Start: 2019-12-06

## (undated) RX ORDER — LIDOCAINE HYDROCHLORIDE 20 MG/ML
INJECTION, SOLUTION EPIDURAL; INFILTRATION; INTRACAUDAL; PERINEURAL
Status: DISPENSED
Start: 2019-12-06

## (undated) RX ORDER — PENTAMIDINE ISETHIONATE 300 MG/300MG
INHALANT RESPIRATORY (INHALATION)
Status: DISPENSED
Start: 2019-09-16

## (undated) RX ORDER — FENTANYL CITRATE-0.9 % NACL/PF 10 MCG/ML
PLASTIC BAG, INJECTION (ML) INTRAVENOUS
Status: DISPENSED
Start: 2023-04-28

## (undated) RX ORDER — DIPHENHYDRAMINE HYDROCHLORIDE 50 MG/ML
INJECTION INTRAMUSCULAR; INTRAVENOUS
Status: DISPENSED
Start: 2020-11-23

## (undated) RX ORDER — ONDANSETRON 2 MG/ML
INJECTION INTRAMUSCULAR; INTRAVENOUS
Status: DISPENSED
Start: 2023-04-28

## (undated) RX ORDER — CARDIOPLEG/ORGAN PRESERV NO.1 9-198-2-1
BOTTLE PERFUSION
Status: DISPENSED
Start: 2019-08-03

## (undated) RX ORDER — PROPOFOL 10 MG/ML
INJECTION, EMULSION INTRAVENOUS
Status: DISPENSED
Start: 2019-12-06

## (undated) RX ORDER — PROPOFOL 10 MG/ML
INJECTION, EMULSION INTRAVENOUS
Status: DISPENSED
Start: 2019-08-03

## (undated) RX ORDER — ALBUTEROL SULFATE 0.83 MG/ML
SOLUTION RESPIRATORY (INHALATION)
Status: DISPENSED
Start: 2019-12-20

## (undated) RX ORDER — FENTANYL CITRATE 50 UG/ML
INJECTION, SOLUTION INTRAMUSCULAR; INTRAVENOUS
Status: DISPENSED
Start: 2017-03-16

## (undated) RX ORDER — ACETAMINOPHEN 325 MG/1
TABLET ORAL
Status: DISPENSED
Start: 2020-11-23

## (undated) RX ORDER — PROPOFOL 10 MG/ML
INJECTION, EMULSION INTRAVENOUS
Status: DISPENSED
Start: 2023-04-28

## (undated) RX ORDER — EPHEDRINE SULFATE 50 MG/ML
INJECTION, SOLUTION INTRAMUSCULAR; INTRAVENOUS; SUBCUTANEOUS
Status: DISPENSED
Start: 2020-11-23

## (undated) RX ORDER — ALBUTEROL SULFATE 0.83 MG/ML
SOLUTION RESPIRATORY (INHALATION)
Status: DISPENSED
Start: 2020-06-19

## (undated) RX ORDER — FENTANYL CITRATE 50 UG/ML
INJECTION, SOLUTION INTRAMUSCULAR; INTRAVENOUS
Status: DISPENSED
Start: 2019-08-03

## (undated) RX ORDER — ONDANSETRON 2 MG/ML
INJECTION INTRAMUSCULAR; INTRAVENOUS
Status: DISPENSED
Start: 2019-08-03

## (undated) RX ORDER — ONDANSETRON 2 MG/ML
INJECTION INTRAMUSCULAR; INTRAVENOUS
Status: DISPENSED
Start: 2019-12-06

## (undated) RX ORDER — LIDOCAINE HYDROCHLORIDE 20 MG/ML
JELLY TOPICAL
Status: DISPENSED
Start: 2021-03-05

## (undated) RX ORDER — OXYCODONE HYDROCHLORIDE 5 MG/1
TABLET ORAL
Status: DISPENSED
Start: 2020-08-20

## (undated) RX ORDER — CEFAZOLIN SODIUM 2 G/100ML
INJECTION, SOLUTION INTRAVENOUS
Status: DISPENSED
Start: 2023-06-09

## (undated) RX ORDER — CIPROFLOXACIN 500 MG/1
TABLET, FILM COATED ORAL
Status: DISPENSED
Start: 2020-10-09

## (undated) RX ORDER — ACETAMINOPHEN 325 MG/1
TABLET ORAL
Status: DISPENSED
Start: 2020-08-20

## (undated) RX ORDER — PENTAMIDINE ISETHIONATE 300 MG/300MG
INHALANT RESPIRATORY (INHALATION)
Status: DISPENSED
Start: 2020-03-16

## (undated) RX ORDER — MAGNESIUM HYDROXIDE 1200 MG/15ML
LIQUID ORAL
Status: DISPENSED
Start: 2019-08-03

## (undated) RX ORDER — LIDOCAINE HYDROCHLORIDE 20 MG/ML
JELLY TOPICAL
Status: DISPENSED
Start: 2020-10-09

## (undated) RX ORDER — PENTAMIDINE ISETHIONATE 300 MG/300MG
INHALANT RESPIRATORY (INHALATION)
Status: DISPENSED
Start: 2020-04-17

## (undated) RX ORDER — LIDOCAINE HYDROCHLORIDE 10 MG/ML
INJECTION, SOLUTION EPIDURAL; INFILTRATION; INTRACAUDAL; PERINEURAL
Status: DISPENSED
Start: 2023-06-09

## (undated) RX ORDER — LIDOCAINE HYDROCHLORIDE 20 MG/ML
JELLY TOPICAL
Status: DISPENSED
Start: 2020-01-31

## (undated) RX ORDER — FENTANYL CITRATE 50 UG/ML
INJECTION, SOLUTION INTRAMUSCULAR; INTRAVENOUS
Status: DISPENSED
Start: 2020-11-23

## (undated) RX ORDER — PENTAMIDINE ISETHIONATE 300 MG/300MG
INHALANT RESPIRATORY (INHALATION)
Status: DISPENSED
Start: 2019-12-20

## (undated) RX ORDER — ACETAMINOPHEN 325 MG/1
TABLET ORAL
Status: DISPENSED
Start: 2019-08-03

## (undated) RX ORDER — DEXAMETHASONE SODIUM PHOSPHATE 4 MG/ML
INJECTION, SOLUTION INTRA-ARTICULAR; INTRALESIONAL; INTRAMUSCULAR; INTRAVENOUS; SOFT TISSUE
Status: DISPENSED
Start: 2019-08-03

## (undated) RX ORDER — ESMOLOL HYDROCHLORIDE 10 MG/ML
INJECTION INTRAVENOUS
Status: DISPENSED
Start: 2019-08-03

## (undated) RX ORDER — ALBUTEROL SULFATE 0.83 MG/ML
SOLUTION RESPIRATORY (INHALATION)
Status: DISPENSED
Start: 2020-02-19

## (undated) RX ORDER — SODIUM CHLORIDE 9 MG/ML
INJECTION, SOLUTION INTRAVENOUS
Status: DISPENSED
Start: 2017-03-16

## (undated) RX ORDER — LABETALOL 20 MG/4 ML (5 MG/ML) INTRAVENOUS SYRINGE
Status: DISPENSED
Start: 2019-08-03

## (undated) RX ORDER — ALBUTEROL SULFATE 0.83 MG/ML
SOLUTION RESPIRATORY (INHALATION)
Status: DISPENSED
Start: 2019-09-16

## (undated) RX ORDER — ACETAMINOPHEN 325 MG/1
TABLET ORAL
Status: DISPENSED
Start: 2019-12-06

## (undated) RX ORDER — ALBUTEROL SULFATE 0.83 MG/ML
SOLUTION RESPIRATORY (INHALATION)
Status: DISPENSED
Start: 2020-05-18

## (undated) RX ORDER — MANNITOL 20 G/100ML
INJECTION, SOLUTION INTRAVENOUS
Status: DISPENSED
Start: 2019-08-03

## (undated) RX ORDER — FENTANYL CITRATE 50 UG/ML
INJECTION, SOLUTION INTRAMUSCULAR; INTRAVENOUS
Status: DISPENSED
Start: 2023-06-09

## (undated) RX ORDER — PROPOFOL 10 MG/ML
INJECTION, EMULSION INTRAVENOUS
Status: DISPENSED
Start: 2020-11-23

## (undated) RX ORDER — HEPARIN SODIUM 1000 [USP'U]/ML
INJECTION, SOLUTION INTRAVENOUS; SUBCUTANEOUS
Status: DISPENSED
Start: 2019-08-03

## (undated) RX ORDER — PENTAMIDINE ISETHIONATE 300 MG/300MG
INHALANT RESPIRATORY (INHALATION)
Status: DISPENSED
Start: 2019-10-16

## (undated) RX ORDER — GABAPENTIN 300 MG/1
CAPSULE ORAL
Status: DISPENSED
Start: 2019-12-06

## (undated) RX ORDER — IOPAMIDOL 510 MG/ML
INJECTION, SOLUTION INTRAVASCULAR
Status: DISPENSED
Start: 2020-11-23

## (undated) RX ORDER — CITRIC ACID/SODIUM CITRATE 334-500MG
SOLUTION, ORAL ORAL
Status: DISPENSED
Start: 2023-04-28

## (undated) RX ORDER — ALBUTEROL SULFATE 0.83 MG/ML
SOLUTION RESPIRATORY (INHALATION)
Status: DISPENSED
Start: 2020-03-16

## (undated) RX ORDER — PAPAVERINE HYDROCHLORIDE 30 MG/ML
INJECTION INTRAMUSCULAR; INTRAVENOUS
Status: DISPENSED
Start: 2019-08-03

## (undated) RX ORDER — VERAPAMIL HYDROCHLORIDE 2.5 MG/ML
INJECTION, SOLUTION INTRAVENOUS
Status: DISPENSED
Start: 2019-08-03

## (undated) RX ORDER — SODIUM CHLORIDE 450 MG/100ML
INJECTION, SOLUTION INTRAVENOUS
Status: DISPENSED
Start: 2019-08-03

## (undated) RX ORDER — IOPAMIDOL 408 MG/ML
INJECTION, SOLUTION INTRATHECAL
Status: DISPENSED
Start: 2020-11-23

## (undated) RX ORDER — PENTAMIDINE ISETHIONATE 300 MG/300MG
INHALANT RESPIRATORY (INHALATION)
Status: DISPENSED
Start: 2019-11-19

## (undated) RX ORDER — PENTAMIDINE ISETHIONATE 300 MG/300MG
INHALANT RESPIRATORY (INHALATION)
Status: DISPENSED
Start: 2020-05-18

## (undated) RX ORDER — FENTANYL CITRATE 50 UG/ML
INJECTION, SOLUTION INTRAMUSCULAR; INTRAVENOUS
Status: DISPENSED
Start: 2020-08-20

## (undated) RX ORDER — PENTAMIDINE ISETHIONATE 300 MG/300MG
INHALANT RESPIRATORY (INHALATION)
Status: DISPENSED
Start: 2020-01-17

## (undated) RX ORDER — WATER 10 ML/10ML
INJECTION INTRAMUSCULAR; INTRAVENOUS; SUBCUTANEOUS
Status: DISPENSED
Start: 2023-12-22

## (undated) RX ORDER — ALBUTEROL SULFATE 0.83 MG/ML
SOLUTION RESPIRATORY (INHALATION)
Status: DISPENSED
Start: 2020-01-17

## (undated) RX ORDER — CALCIUM CHLORIDE 100 MG/ML
INJECTION INTRAVENOUS; INTRAVENTRICULAR
Status: DISPENSED
Start: 2019-08-03

## (undated) RX ORDER — DEXAMETHASONE SODIUM PHOSPHATE 10 MG/ML
INJECTION, SOLUTION INTRAMUSCULAR; INTRAVENOUS
Status: DISPENSED
Start: 2023-04-28

## (undated) RX ORDER — CEFAZOLIN SODIUM 1 G/3ML
INJECTION, POWDER, FOR SOLUTION INTRAMUSCULAR; INTRAVENOUS
Status: DISPENSED
Start: 2020-08-20

## (undated) RX ORDER — ONDANSETRON 2 MG/ML
INJECTION INTRAMUSCULAR; INTRAVENOUS
Status: DISPENSED
Start: 2020-11-23

## (undated) RX ORDER — PENTAMIDINE ISETHIONATE 300 MG/300MG
INHALANT RESPIRATORY (INHALATION)
Status: DISPENSED
Start: 2020-02-19

## (undated) RX ORDER — LIDOCAINE HYDROCHLORIDE 20 MG/ML
INJECTION, SOLUTION EPIDURAL; INFILTRATION; INTRACAUDAL; PERINEURAL
Status: DISPENSED
Start: 2019-08-03

## (undated) RX ORDER — GLYCOPYRROLATE 0.2 MG/ML
INJECTION, SOLUTION INTRAMUSCULAR; INTRAVENOUS
Status: DISPENSED
Start: 2019-08-03

## (undated) RX ORDER — ALBUTEROL SULFATE 0.83 MG/ML
SOLUTION RESPIRATORY (INHALATION)
Status: DISPENSED
Start: 2020-04-17

## (undated) RX ORDER — MAGNESIUM SULFATE 1 G/100ML
INJECTION INTRAVENOUS
Status: DISPENSED
Start: 2019-08-03

## (undated) RX ORDER — CEFAZOLIN SODIUM 1 G/3ML
INJECTION, POWDER, FOR SOLUTION INTRAMUSCULAR; INTRAVENOUS
Status: DISPENSED
Start: 2019-12-06

## (undated) RX ORDER — PENTAMIDINE ISETHIONATE 300 MG/300MG
INHALANT RESPIRATORY (INHALATION)
Status: DISPENSED
Start: 2020-06-19

## (undated) RX ORDER — GLYCOPYRROLATE 0.2 MG/ML
INJECTION, SOLUTION INTRAMUSCULAR; INTRAVENOUS
Status: DISPENSED
Start: 2020-11-23

## (undated) RX ORDER — SINCALIDE 5 UG/5ML
INJECTION, POWDER, LYOPHILIZED, FOR SOLUTION INTRAVENOUS
Status: DISPENSED
Start: 2023-12-22

## (undated) RX ORDER — ALBUTEROL SULFATE 0.83 MG/ML
SOLUTION RESPIRATORY (INHALATION)
Status: DISPENSED
Start: 2020-07-17

## (undated) RX ORDER — LIDOCAINE HYDROCHLORIDE 20 MG/ML
INJECTION, SOLUTION EPIDURAL; INFILTRATION; INTRACAUDAL; PERINEURAL
Status: DISPENSED
Start: 2020-11-23

## (undated) RX ORDER — FENTANYL CITRATE-0.9 % NACL/PF 10 MCG/ML
PLASTIC BAG, INJECTION (ML) INTRAVENOUS
Status: DISPENSED
Start: 2020-11-23

## (undated) RX ORDER — DEXAMETHASONE SODIUM PHOSPHATE 4 MG/ML
INJECTION, SOLUTION INTRA-ARTICULAR; INTRALESIONAL; INTRAMUSCULAR; INTRAVENOUS; SOFT TISSUE
Status: DISPENSED
Start: 2020-11-23

## (undated) RX ORDER — PENTAMIDINE ISETHIONATE 300 MG/300MG
INHALANT RESPIRATORY (INHALATION)
Status: DISPENSED
Start: 2020-07-17

## (undated) RX ORDER — LIDOCAINE HYDROCHLORIDE AND EPINEPHRINE 10; 10 MG/ML; UG/ML
INJECTION, SOLUTION INFILTRATION; PERINEURAL
Status: DISPENSED
Start: 2024-03-04

## (undated) RX ORDER — SODIUM CHLORIDE 9 MG/ML
INJECTION, SOLUTION INTRAVENOUS
Status: DISPENSED
Start: 2019-08-03

## (undated) RX ORDER — ACETAMINOPHEN 325 MG/1
TABLET ORAL
Status: DISPENSED
Start: 2023-04-28

## (undated) RX ORDER — ONDANSETRON 4 MG/1
TABLET, ORALLY DISINTEGRATING ORAL
Status: DISPENSED
Start: 2020-08-20

## (undated) RX ORDER — ALBUTEROL SULFATE 0.83 MG/ML
SOLUTION RESPIRATORY (INHALATION)
Status: DISPENSED
Start: 2019-10-16

## (undated) RX ORDER — DEXAMETHASONE SODIUM PHOSPHATE 4 MG/ML
INJECTION, SOLUTION INTRA-ARTICULAR; INTRALESIONAL; INTRAMUSCULAR; INTRAVENOUS; SOFT TISSUE
Status: DISPENSED
Start: 2023-04-28